# Patient Record
Sex: FEMALE | Race: WHITE | NOT HISPANIC OR LATINO | Employment: UNEMPLOYED | URBAN - METROPOLITAN AREA
[De-identification: names, ages, dates, MRNs, and addresses within clinical notes are randomized per-mention and may not be internally consistent; named-entity substitution may affect disease eponyms.]

---

## 2019-11-06 ENCOUNTER — TELEPHONE (OUTPATIENT)
Dept: GASTROENTEROLOGY | Facility: CLINIC | Age: 54
End: 2019-11-06
Payer: MEDICARE

## 2019-11-07 ENCOUNTER — CARE COORDINATION (OUTPATIENT)
Dept: GASTROENTEROLOGY | Facility: CLINIC | Age: 54
End: 2019-11-07

## 2019-11-07 NOTE — PROGRESS NOTES
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 She may need surgical resection and I believe would benefit from multidisciplinary approach thus my recommendation is urgent referral to Halley Plata/Razia at [facility] GI/surgery dept for consideration of repeat EUS and/or surgery (whipple vs TPIAT). Further management of likely main duct IPMN per [facility].     Referring contact information see initial referral note   Referral for EUS    Diagnosis: IPMN    Imaging:   CT   Location: [facility]  Date:  11/19/18  CONCLUSION:  1.  Pancreatic ductal dilatation is again seen.  2.  No bowel obstruction or abscess.  3.  Diverticulosis.  4.  Normal appendix.    MRI    Location:  [facility]  Date:  09/21/19  IMPRESSION:  1.  Diffusely dilated pancreatic duct measuring up to 9 mm. No intraductal abnormality. The findings may be related to an ampullary stricture. Main duct intraductal papillary mucinous neoplasm could also be a consideration. No solid lesion evident.  2.  No choledocholithiasis.  3.  Layering gallbladder sludge.    10/31/18  CONCLUSION:  1.  Pancreatic duct is dilated and is particularly dilated in the head of the pancreas where it measures up to 7 mm. Common bile duct measures at the upper limits of normal at 6 mm. Etiology for the pancreatic ductal dilatation is not visualized. Recommend ERCP for further evaluation for etiology of the ductal dilatation. A mass is not seen in the head of the pancreas, however, an ampullary lesion is certainly not excluded, given the extent of the pancreatic ductal dilatation.    Procedures:  EUS 11/13/18; 10/29/19 - see care everywhere; pathology in note from Dr. Escobar 10/29/19    Referral will be reviewed by Dr. Plata  - images arrived at 3 pm 11/7/19    Referral sent to [facility] Schaefer  for surgery who will review this consult.  See Dr. Plata note.     Carla Flannery  BSN, HNBC  RN Care Coordinator  [facility]  Ph: [phone]  Email: [email or web address]

## 2019-11-08 NOTE — PROGRESS NOTES
Discussed with Kenzie Ace.    I would favor evaluation by Dr. Mallory, who performs the vast majority of our pancreatic neoplasm surgery. Referral is for concern re possible main duct variant IPMN with pancreatic duct dilation to 1 cm.     Pt has had two EUS exams with needle aspiration of pancreatic duct by Dr. Escobar (10/29/19 and 11/19/18). CEA levels in fluid low, however most recent cytology showed mucinous epithelium without high-grade dysplasia. Neither EUS or MRIs suggested parenchymal changes of chronic pancreatitis and EUS did not show ampullary lesion.   Has not had ERCP. Last MRI 9/21/19.    Referral requested due to concern for main duct IPMN which may warrant surgical intervention. I do not see that pt has previously had outpt clinic consultations for this.  Review of chart otherwise significant for chronic pain issues with chronic narcotic use and controlled substance agreement (appears related to musculoskeletal issues) and outpt GI clinic evaluation for chronic nausea and vomiting.    This chart will be forwarded to Kenzie Ace to review with Dr. Mallory re possible consultation. I will be available to see pt in clinic or perform additional endoscopic evaluation if it is agreed that pt warrants surgical consultation here.    PATRICK Plata MD  Associate Professor of Medicine  Division of Gastroenterology, Hepatology and Nutrition  [facility]

## 2019-11-11 ENCOUNTER — DOCUMENTATION ONLY (OUTPATIENT)
Dept: SURGERY | Facility: CLINIC | Age: 54
End: 2019-11-11

## 2019-11-11 DIAGNOSIS — D49.0 IPMN (INTRADUCTAL PAPILLARY MUCINOUS NEOPLASM): Primary | ICD-10-CM

## 2019-11-11 NOTE — PROGRESS NOTES
Surgical Oncology/Hepatobiliary Surgery Referral      Referring provider: Dr. Ronquillo      Referred to: Surgical oncology - Hepatobiliary Surgeon      Referral Received:  11/8/2019      Evaluation for: IPMN      Oncology provider (if applicable): KEV      Clinical History (per RN review of records provided):      - Patient was sent for consult for Multidisciplinary review for IPMN with Dr. Plata and Dr. Randle. Dr. Randle doesn't see pre malignant and referral forwarded on to the Surg Onc group for appointment.     - Referral referred by Dr. Plata and felt patient needs surgical consult only at this time as she has had ongoing GI surveillance for pancreas cyst with GI provider at [facility]. Patient also noted to have ongoing nausea and vomiting along with chronic narcotic use. He does not feel pancreas cyst is related to ongoing GI symptom and patient can continue follow up with current GI provider as he would not change surveillance plans should surgery not be offered.     - Patient followed closely by GI at [facility]. Previous imaging and EUS procedure done including pancreatic duct biopsies all available in Care everywhere.       Imaging: Imaging pushed, will obtain new CT A/P with pancreas protocol prior to appointment with surgery per protocol      Staging complete (if applicable): KEV       11/11/2019 8:44 AM - Message sent to scheduling to set patient up for first available consult with provider and CT scan.

## 2019-11-14 NOTE — TELEPHONE ENCOUNTER
ONCOLOGY INTAKE: Records Information      APPT INFORMATION: 12/20/19 - [facility] - [facility] [place]  Referring provider:  Yg  Referring provider s clinic:  [facility]  Reason for visit/diagnosis:  IPMN  Has patient been notified of appointment date and time?: Yes    RECORDS INFORMATION:  Were the records received with the referral (via [employer])? Internal referral    Has patient been seen for any external appt for this diagnosis? No    If yes, where? NA    Has patient had any imaging or procedures outside of [place] for this condition? No      If Yes, where? NA    ADDITIONAL INFORMATION:  Scheduled via in[employer] from Kenzie   I called & confirmed appt with pt    Pt requesting to have CT CAP done at [facility] in [place] instead of [facility] - inbasket sent to Kenzie to advise

## 2019-11-15 NOTE — TELEPHONE ENCOUNTER
RECORDS STATUS - ALL OTHER DIAGNOSIS      RECORDS RECEIVED FROM: [facility] - Internal, All HP Images resolved, viewable to PACS. Reports in CE.    DATE RECEIVED: 11/15/19   NOTES STATUS DETAILS   OFFICE NOTE from referring provider     OFFICE NOTE from medical oncologist     DISCHARGE SUMMARY from hospital     DISCHARGE REPORT from the ER     OPERATIVE REPORT     MEDICATION LIST     CLINICAL TRIAL TREATMENTS TO DATE     LABS     PATHOLOGY REPORTS     ANYTHING RELATED TO DIAGNOSIS     GENONOMIC TESTING     TYPE:     IMAGING (NEED IMAGES & REPORT)     CT SCANS     MRI     MAMMO     ULTRASOUND     PET

## 2019-11-20 ENCOUNTER — TELEPHONE (OUTPATIENT)
Dept: SURGERY | Facility: CLINIC | Age: 54
End: 2019-11-20

## 2019-12-18 ENCOUNTER — ANCILLARY PROCEDURE (OUTPATIENT)
Dept: CT IMAGING | Facility: CLINIC | Age: 54
End: 2019-12-18
Attending: SURGERY
Payer: MEDICARE

## 2019-12-18 DIAGNOSIS — D49.0 IPMN (INTRADUCTAL PAPILLARY MUCINOUS NEOPLASM): ICD-10-CM

## 2019-12-18 LAB
CREAT BLD-MCNC: 1 MG/DL (ref 0.52–1.04)
GFR SERPL CREATININE-BSD FRML MDRD: 58 ML/MIN/{1.73_M2}

## 2019-12-18 RX ORDER — IOPAMIDOL 755 MG/ML
122 INJECTION, SOLUTION INTRAVASCULAR ONCE
Status: COMPLETED | OUTPATIENT
Start: 2019-12-18 | End: 2019-12-18

## 2019-12-18 RX ADMIN — IOPAMIDOL 122 ML: 755 INJECTION, SOLUTION INTRAVASCULAR at 13:11

## 2019-12-19 ASSESSMENT — ENCOUNTER SYMPTOMS
DECREASED CONCENTRATION: 0
NAUSEA: 1
BLOOD IN STOOL: 0
NERVOUS/ANXIOUS: 1
MUSCLE WEAKNESS: 0
CONSTIPATION: 1
MYALGIAS: 1
RECTAL PAIN: 0
BACK PAIN: 1
INSOMNIA: 1
PANIC: 0
ARTHRALGIAS: 1
JOINT SWELLING: 1
BLOATING: 1
JAUNDICE: 0
MUSCLE CRAMPS: 0
VOMITING: 1
BOWEL INCONTINENCE: 0
NECK PAIN: 1
DEPRESSION: 1
ABDOMINAL PAIN: 1
STIFFNESS: 1
HEARTBURN: 1

## 2019-12-20 ENCOUNTER — OFFICE VISIT (OUTPATIENT)
Dept: SURGERY | Facility: CLINIC | Age: 54
End: 2019-12-20
Attending: SURGERY
Payer: MEDICARE

## 2019-12-20 ENCOUNTER — PRE VISIT (OUTPATIENT)
Dept: SURGERY | Facility: CLINIC | Age: 54
End: 2019-12-20

## 2019-12-20 VITALS
HEIGHT: 69 IN | HEART RATE: 101 BPM | OXYGEN SATURATION: 98 % | RESPIRATION RATE: 16 BRPM | TEMPERATURE: 98.9 F | BODY MASS INDEX: 29.28 KG/M2 | SYSTOLIC BLOOD PRESSURE: 114 MMHG | WEIGHT: 197.7 LBS | DIASTOLIC BLOOD PRESSURE: 74 MMHG

## 2019-12-20 DIAGNOSIS — D49.0 IPMN (INTRADUCTAL PAPILLARY MUCINOUS NEOPLASM): Primary | ICD-10-CM

## 2019-12-20 PROCEDURE — G0463 HOSPITAL OUTPT CLINIC VISIT: HCPCS | Mod: ZF

## 2019-12-20 PROCEDURE — 40000114 ZZH STATISTIC NO CHARGE CLINIC VISIT

## 2019-12-20 RX ORDER — OXYCODONE HYDROCHLORIDE 10 MG/1
10 TABLET ORAL 4 TIMES DAILY
COMMUNITY
Start: 2019-12-03 | End: 2020-03-19

## 2019-12-20 RX ORDER — CITALOPRAM HYDROBROMIDE 20 MG/1
20 TABLET ORAL EVERY MORNING
COMMUNITY
Start: 2019-02-11 | End: 2021-08-10 | Stop reason: ALTCHOICE

## 2019-12-20 RX ORDER — SUMATRIPTAN 100 MG/1
100 TABLET, FILM COATED ORAL
COMMUNITY
Start: 2016-05-23 | End: 2020-07-20

## 2019-12-20 ASSESSMENT — PAIN SCALES - GENERAL: PAINLEVEL: MILD PAIN (3)

## 2019-12-20 ASSESSMENT — MIFFLIN-ST. JEOR: SCORE: 1562.17

## 2019-12-20 NOTE — LETTER
"12/20/2019       RE: Nathalie Bashir  [address] [address]     Dear Colleague,    Thank you for referring your patient, Nathalie Bashir, to the [facility]. Please see a copy of my visit note below.    [facility] - Surgical Oncology    NEW CONSULTATION  Dec 20, 2019    Reason for Visit:    Nathalie Bashir is a 54 year old female who presents with suspected main duct IPMN in the pancreatic head.  She was referred by Dr Moulton.    Pertinent Oncologic History:  54 F w/ history of atypical abdominal symptoms and GI complaints including nausea and intermittent diarrhea.  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  While on surveillance, her most recent EUS showed increase in the size of the cyst described as dilation of the main pancreatic duct in the head of the pancreas to 10.4 mm.  MRI 9 mm, and CT 8 mm.  No other suspicious features.  FNA and fluid analysis showed mucinous epithelium, a string sign, high fluid amylase, and normal fluid CEA.  She now presents for recommendations on management.    Pertinent Exam: Abdomen soft, non-tender, and non-distended.  No jaundice or scleral icterus.    HISTORY OF PRESENT ILLNESS:  The patient is feeling relatively well and is having her typical abdominal symptoms.  She has no history of prior pancreatitis, and no imaging or EUS evidence of pancreatitis or pancreatic duct stricture.  No anorexia or weight loss.  No jaundice or pruritis.  She does not routinely use alcohol, but she is an intermittent smoker.  She has decent exercise tolerance without exertional dyspnea or chest pain, and is independent with ADLs.  No cardiac history.  No blood thinners.  No prior abdominal surgery.    /74   Pulse 101   Temp 98.9  F (37.2  C) (Oral)   Resp 16   Ht 1.746 m (5' 8.75\")   Wt 89.7 kg (197 lb 11.2 oz)   SpO2 98%   BMI 29.41 kg/m        Pertinent Work-Up/Findings:    Labs: " Cyst fluid analysis from 10/29/2019 --> CEA 4.3, Amylase 207,252.  Cyst fluid cytology shows mucinous epithelium with degenerative changes, but no evidence of dysplasia.    MRI (9/21/2019): Diffusely dilated main pancreatic duct with maximal diameter of 9 mm in the head of the pancreas.  No masses.  No choledocholithiasis, but presence of gallbladder sludge.    CT (12/18/2019): Stable prominence of the main pancreatic duct with maximal diameter of 8 mm in the head of the pancreas.  No other suspicious features.    EUS (10/29/2019): 10.4 mm dilation of the main pancreatic duct in the head without duct stricture or sequelae of chronic pancreatitis.  No masses.  Normal appearing ampulla.  Cyst fluid with positive string sign.    Assessment/Counseling/Plan:    Nathalie Bashir is a 54 year old female with likely main duct IPMN with main duct size of 8-10.4 mm depending on imaging modality.  This is based off the presence of a dilated main duct and also the presence of mucinous epithelium on the cyst fluid analysis.  There are no other suspicious features.  I had an extensive discussion with the patient regarding pancreatic cysts, in particular, IPMN.  I discussed that I believe she has a main duct IPMN with a duct size that is consistent with at least a worrisome feature, and borderline for high risk based on her multiple imaging modalities and the most recent [place] guidelines for management of pancreatic cysts.  In this case, I discussed the increased risk of occult malignancy or malignant degeneration moving forward.  She is relatively healthy and only 54 years of age, thus this IPMN is likely to be a significant factor moving forward.  As such, I discussed that the standard of care is to offer surgical resection.  This would require a Whipple operation.  I believe she will tolerate the operation, but I mentioned that the surgery is A) unlikely to correct her current GI symptoms, B) may reveal completely benign duct  changes, or C) may reveal the presence of an occult invasive focus.  Regardless, the benefit of the operation is to either mitigate the risk of malignant degeneration in the future or to treat a small focus of invasive disease if present.  With that being said, I discussed the risks of the operation including but not limited to death, bleeding, infection, UTI, pneumonia, DVT/PE, cardiac events, stroke, renal failure, delayed gastric emptying, anastomotic leaks (GJ, biliary, and pancreatic), need for further intervention or prolonged drainage, pseudoaneurysm formation and rupture, chyle leak, damage to surrounding structures, failure to thrive, and potential need for enteral or parenteral nutritional support.  I discussed that smoking increases most, if not all of these risks, particularly issues related to healing, pneumonia, and blood clots.  I recommended cessation at least 30 days prior to any surgical intervention.  Prior to scheduling the patient with the surgery, I plan to further discuss the patient with my colleagues to obtain a general consensus on proceeding with a high risk surgery.  I will call the patient with the final plan, but I informed her the high likelihood that the recommendation for surgery will change.  All questions were answered and the patient was in agreement with and understanding of the plan.    Total time spent with the patient was 45 minutes, of which more than half was counseling and coordination of care.    Again, thank you for allowing me to participate in the care of your patient.      Sincerely,    Yandel Mallory MD

## 2019-12-20 NOTE — PROGRESS NOTES
"[facility] - Surgical Oncology    NEW CONSULTATION  Dec 20, 2019    Reason for Visit:    Nathalie Bashir is a 54 year old female who presents with suspected main duct IPMN in the pancreatic head.  She was referred by Dr Moulton.    Pertinent Oncologic History: 54 F w/ history of atypical abdominal symptoms and GI complaints including nausea and intermittent diarrhea.  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  While on surveillance, her most recent EUS showed increase in the size of the cyst described as dilation of the main pancreatic duct in the head of the pancreas to 10.4 mm.  MRI 9 mm, and CT 8 mm.  No other suspicious features.  FNA and fluid analysis showed mucinous epithelium, a string sign, high fluid amylase, and normal fluid CEA.  She now presents for recommendations on management.    Pertinent Exam: Abdomen soft, non-tender, and non-distended.  No jaundice or scleral icterus.    HISTORY OF PRESENT ILLNESS:  The patient is feeling relatively well and is having her typical abdominal symptoms.  She has no history of prior pancreatitis, and no imaging or EUS evidence of pancreatitis or pancreatic duct stricture.  No anorexia or weight loss.  No jaundice or pruritis.  She does not routinely use alcohol, but she is an intermittent smoker.  She has decent exercise tolerance without exertional dyspnea or chest pain, and is independent with ADLs.  No cardiac history.  No blood thinners.  No prior abdominal surgery.    /74   Pulse 101   Temp 98.9  F (37.2  C) (Oral)   Resp 16   Ht 1.746 m (5' 8.75\")   Wt 89.7 kg (197 lb 11.2 oz)   SpO2 98%   BMI 29.41 kg/m       Pertinent Work-Up/Findings:    Labs: Cyst fluid analysis from 10/29/2019 --> CEA 4.3, Amylase 207,252.  Cyst fluid cytology shows mucinous epithelium with degenerative changes, but no evidence of dysplasia.    MRI (9/21/2019): Diffusely dilated main pancreatic duct with " maximal diameter of 9 mm in the head of the pancreas.  No masses.  No choledocholithiasis, but presence of gallbladder sludge.    CT (12/18/2019): Stable prominence of the main pancreatic duct with maximal diameter of 8 mm in the head of the pancreas.  No other suspicious features.    EUS (10/29/2019): 10.4 mm dilation of the main pancreatic duct in the head without duct stricture or sequelae of chronic pancreatitis.  No masses.  Normal appearing ampulla.  Cyst fluid with positive string sign.    Assessment/Counseling/Plan:    Nathalie Bashir is a 54 year old female with likely main duct IPMN with main duct size of 8-10.4 mm depending on imaging modality.  This is based off the presence of a dilated main duct and also the presence of mucinous epithelium on the cyst fluid analysis.  There are no other suspicious features.  I had an extensive discussion with the patient regarding pancreatic cysts, in particular, IPMN.  I discussed that I believe she has a main duct IPMN with a duct size that is consistent with at least a worrisome feature, and borderline for high risk based on her multiple imaging modalities and the most recent [place] guidelines for management of pancreatic cysts.  In this case, I discussed the increased risk of occult malignancy or malignant degeneration moving forward.  She is relatively healthy and only 54 years of age, thus this IPMN is likely to be a significant factor moving forward.  As such, I discussed that the standard of care is to offer surgical resection.  This would require a Whipple operation.  I believe she will tolerate the operation, but I mentioned that the surgery is A) unlikely to correct her current GI symptoms, B) may reveal completely benign duct changes, or C) may reveal the presence of an occult invasive focus.  Regardless, the benefit of the operation is to either mitigate the risk of malignant degeneration in the future or to treat a small focus of invasive disease if  present.  With that being said, I discussed the risks of the operation including but not limited to death, bleeding, infection, UTI, pneumonia, DVT/PE, cardiac events, stroke, renal failure, delayed gastric emptying, anastomotic leaks (GJ, biliary, and pancreatic), need for further intervention or prolonged drainage, pseudoaneurysm formation and rupture, chyle leak, damage to surrounding structures, failure to thrive, and potential need for enteral or parenteral nutritional support.  I discussed that smoking increases most, if not all of these risks, particularly issues related to healing, pneumonia, and blood clots.  I recommended cessation at least 30 days prior to any surgical intervention.  Prior to scheduling the patient with the surgery, I plan to further discuss the patient with my colleagues to obtain a general consensus on proceeding with a high risk surgery.  I will call the patient with the final plan, but I informed her the high likelihood that the recommendation for surgery will change.  All questions were answered and the patient was in agreement with and understanding of the plan.    Total time spent with the patient was 45 minutes, of which more than half was counseling and coordination of care.

## 2019-12-26 ENCOUNTER — TELEPHONE (OUTPATIENT)
Dept: SURGERY | Facility: CLINIC | Age: 54
End: 2019-12-26

## 2019-12-26 NOTE — TELEPHONE ENCOUNTER
I called to inform the patient that I had discussed her case with a partner and we are in agreement that no further work-up needs to be completed.  We believe she has main duct IPMN with borderline high risk features given dilation of 10.4 mm on EUS.  As such, I am recommending we proceed with surgery as discussed in our previous clinic appointment.  All questions answered and the patient was in agreement with and understanding of the plan.

## 2019-12-27 DIAGNOSIS — D49.0 IPMN (INTRADUCTAL PAPILLARY MUCINOUS NEOPLASM): Primary | ICD-10-CM

## 2020-01-03 ENCOUNTER — TELEPHONE (OUTPATIENT)
Dept: SURGERY | Facility: CLINIC | Age: 55
End: 2020-01-03

## 2020-01-03 DIAGNOSIS — D49.0 IPMN (INTRADUCTAL PAPILLARY MUCINOUS NEOPLASM): Primary | ICD-10-CM

## 2020-01-03 NOTE — TELEPHONE ENCOUNTER
I scheduled surgery for this patient with Dr. Mallory on 1/28 at [place].   Scheduled per patient/orders.    Additional appointments:  1/27:   PAC at 10:30 AM    Post-op: 2/21 at 10 AM    I called the patient and was able to confirm the scheduled dates.     I sent a surgery packet to them via US mail and [employer], per their preference. This contains education and directions for the surgery.     They are aware that they will EITHER receive a call  ~2 days prior to the scheduled procedure and will be given an exact arrival/start time OR get their exact times at their appointment with PAC. They are also aware that more education regarding the surgery will be provided at their consult.

## 2020-01-06 NOTE — TELEPHONE ENCOUNTER
FUTURE VISIT INFORMATION      SURGERY INFORMATION:    Date: 20    Location: [facility] OR    Surgeon:  Yandel Mallory    Anesthesia Type:  General    RECORDS REQUESTED FROM:       [facility]    [id number]

## 2020-01-15 PROBLEM — M54.50 CHRONIC BILATERAL LOW BACK PAIN WITHOUT SCIATICA: Status: ACTIVE | Noted: 2017-08-18

## 2020-01-15 PROBLEM — G89.29 CHRONIC BILATERAL LOW BACK PAIN WITHOUT SCIATICA: Status: ACTIVE | Noted: 2017-08-18

## 2020-01-15 PROBLEM — G43.109 MIGRAINE WITH AURA: Status: ACTIVE | Noted: 2018-06-21

## 2020-01-15 PROBLEM — Z79.899 CONTROLLED SUBSTANCE AGREEMENT SIGNED: Status: ACTIVE | Noted: 2017-06-29

## 2020-01-15 PROBLEM — M54.12 CERVICAL RADICULOPATHY, CHRONIC: Status: ACTIVE | Noted: 2017-08-18

## 2020-01-15 PROBLEM — K40.90 UNILATERAL INGUINAL HERNIA WITHOUT OBSTRUCTION OR GANGRENE: Status: ACTIVE | Noted: 2018-04-05

## 2020-01-15 PROBLEM — M54.2 CHRONIC NECK PAIN: Status: ACTIVE | Noted: 2018-08-30

## 2020-01-15 PROBLEM — F32.A DEPRESSION: Status: ACTIVE | Noted: 2020-01-15

## 2020-01-15 PROBLEM — M46.1 SACROILIITIS (H): Status: ACTIVE | Noted: 2020-01-15

## 2020-01-15 PROBLEM — M71.9 BURSITIS: Status: ACTIVE | Noted: 2020-01-15

## 2020-01-15 PROBLEM — G89.29 CHRONIC NECK PAIN: Status: ACTIVE | Noted: 2018-08-30

## 2020-01-17 ENCOUNTER — PRE VISIT (OUTPATIENT)
Dept: SURGERY | Facility: CLINIC | Age: 55
End: 2020-01-17

## 2020-01-17 ENCOUNTER — OFFICE VISIT (OUTPATIENT)
Dept: SURGERY | Facility: CLINIC | Age: 55
End: 2020-01-17
Payer: MEDICARE

## 2020-01-17 ENCOUNTER — ANESTHESIA EVENT (OUTPATIENT)
Dept: SURGERY | Facility: CLINIC | Age: 55
DRG: 327 | End: 2020-01-17
Payer: MEDICARE

## 2020-01-17 VITALS — BODY MASS INDEX: 29.7 KG/M2 | HEIGHT: 68 IN | WEIGHT: 196 LBS

## 2020-01-17 VITALS
HEIGHT: 68 IN | WEIGHT: 196 LBS | TEMPERATURE: 98.1 F | HEART RATE: 93 BPM | BODY MASS INDEX: 29.7 KG/M2 | RESPIRATION RATE: 20 BRPM | DIASTOLIC BLOOD PRESSURE: 74 MMHG | OXYGEN SATURATION: 98 % | SYSTOLIC BLOOD PRESSURE: 108 MMHG

## 2020-01-17 DIAGNOSIS — Z01.818 PREOP EXAMINATION: ICD-10-CM

## 2020-01-17 DIAGNOSIS — D49.0 IPMN (INTRADUCTAL PAPILLARY MUCINOUS NEOPLASM): ICD-10-CM

## 2020-01-17 DIAGNOSIS — Z01.818 PREOP EXAMINATION: Primary | ICD-10-CM

## 2020-01-17 PROBLEM — Z79.891 LONG TERM (CURRENT) USE OF OPIATE ANALGESIC: Status: ACTIVE | Noted: 2020-01-17

## 2020-01-17 LAB
ALBUMIN SERPL-MCNC: 3.7 G/DL (ref 3.4–5)
ALP SERPL-CCNC: 106 U/L (ref 40–150)
ALT SERPL W P-5'-P-CCNC: 17 U/L (ref 0–50)
ANION GAP SERPL CALCULATED.3IONS-SCNC: 4 MMOL/L (ref 3–14)
AST SERPL W P-5'-P-CCNC: 12 U/L (ref 0–45)
BILIRUB SERPL-MCNC: 0.4 MG/DL (ref 0.2–1.3)
BUN SERPL-MCNC: 10 MG/DL (ref 7–30)
CALCIUM SERPL-MCNC: 9.1 MG/DL (ref 8.5–10.1)
CHLORIDE SERPL-SCNC: 102 MMOL/L (ref 94–109)
CO2 SERPL-SCNC: 29 MMOL/L (ref 20–32)
CREAT SERPL-MCNC: 0.93 MG/DL (ref 0.52–1.04)
ERYTHROCYTE [DISTWIDTH] IN BLOOD BY AUTOMATED COUNT: 13.7 % (ref 10–15)
GFR SERPL CREATININE-BSD FRML MDRD: 70 ML/MIN/{1.73_M2}
GLUCOSE SERPL-MCNC: 90 MG/DL (ref 70–99)
HCT VFR BLD AUTO: 40.9 % (ref 35–47)
HGB BLD-MCNC: 13.7 G/DL (ref 11.7–15.7)
MCH RBC QN AUTO: 29.7 PG (ref 26.5–33)
MCHC RBC AUTO-ENTMCNC: 33.5 G/DL (ref 31.5–36.5)
MCV RBC AUTO: 89 FL (ref 78–100)
NT-PROBNP SERPL-MCNC: 184 PG/ML (ref 0–125)
PLATELET # BLD AUTO: 346 10E9/L (ref 150–450)
POTASSIUM SERPL-SCNC: 4.1 MMOL/L (ref 3.4–5.3)
PROT SERPL-MCNC: 7.1 G/DL (ref 6.8–8.8)
RBC # BLD AUTO: 4.61 10E12/L (ref 3.8–5.2)
SODIUM SERPL-SCNC: 135 MMOL/L (ref 133–144)
WBC # BLD AUTO: 8.2 10E9/L (ref 4–11)

## 2020-01-17 RX ORDER — HYDROXYZINE HYDROCHLORIDE 10 MG/1
10 TABLET, FILM COATED ORAL 3 TIMES DAILY PRN
COMMUNITY
End: 2020-07-20

## 2020-01-17 ASSESSMENT — ENCOUNTER SYMPTOMS: SEIZURES: 0

## 2020-01-17 ASSESSMENT — MIFFLIN-ST. JEOR
SCORE: 1542.55
SCORE: 1542.55

## 2020-01-17 ASSESSMENT — PAIN SCALES - GENERAL
PAINLEVEL: SEVERE PAIN (6)
PAINLEVEL: SEVERE PAIN (6)

## 2020-01-17 ASSESSMENT — LIFESTYLE VARIABLES: TOBACCO_USE: 1

## 2020-01-17 NOTE — PHARMACY - PREOPERATIVE ASSESSMENT CENTER
PREOPERATIVE PAIN CONSULT FOR POSTOPERATIVE PAIN MANAGEMENT  Nathalie Bashir was seen and interviewed during PAC Clinic appointment on January 17, 2020 in preparation for the planned procedure with Dr. Lynch on 1/28/2020 at the [facility] for Open Whipple.  These recommendations are intended for patients admitted to the hospital after a procedure and are only valid for 30 days from the date of service. If there are significant changes in opioid dosing between today and day of procedure the below recommendations may have to be adjusted.      RECOMMENDATIONS:   The following pain management recommendations are made based on information from today's visit and should not replace medical decision-making based on patient condition at the time of procedure or postoperatively.      - PREOPERATIVE:  + Take morning dose of oxycodone.  NOTE:  PATIENT DOES NOT TOLERATE ACETAMINOPHEN OR GABAPENTIN / PREGABALIN    - INTRAOPERATIVE (Anesthesiologist/CRNA to consider):   + Regional anesthesia - Defer to RAPS team  + Ketamine IV intraoperatively  + Avoid remifentanil - to reduce risk of developing hyperalgesia    - POSTOPERATIVE MANAGEMENT:  Place inpatient pain management consult to assist with acute postoperative pain management.    Opioid analgesic:     + If unable to take oral medications:          -- HYDROmorphone (Dilaudid) PCA dose range 0.2-0.4 mg Q 10 min lockout interval with NO continuous rate.  Start with 0.3mg PCA dose and increase dose by 0.1 mg every 1 hour as necessary for pain control or improvement in physical function, hold or reduce dose for sedation. Notify provider to assess for uncontrolled pain or sedation and adjust dose as necessary.          -- Hold other PO and IV opioids while on PCA    Nonopioid analgesics:   + Defer use of NSAID to surgeon  NOTE:  PATIENT DOES NOT TOLERATE ACETAMINOPHEN, GABAPENTIN OR PREGABALIN    Muscle Relaxant:   + izanidine 4 mg PO Q8 hr PRN muscle spasm  OR  + If unable to tolerate PO meds, use Diazepam (Valium) 2.5 mg IV Q 6 hr PRN muscle spasm    Stool softeners/Laxatives:   + When appropriate start senna-docusate 1-2 tabs PO BID and Miralax 17 g daily to prevent opioid induced constipation.     Other:  + Recommend close monitoring of respiratory status postoperatively with capnography and continuous SpO2 monitoring. Would recommend continuing capnography beyond the usual 24 hr due to patient's opioid tolerance.      + Ketamine IV infusion.  If needed for acute postop uncontrolled pain, the primary service may decide to start ketamine infusion at 5 mg/hr.  Ketamine for acute pain management will be used as an analgesic medication in addition to opioids when usual analgesics are suboptimal.Only start ketamine IV if patient is stable from a cardiovascular standpoint.  Low dose ketamine may be administered on a regular nursing unit according to [facility] ketamine-low dose policy.  Please see policy for details on contraindications, adverse effects and monitoring: [email or web address]     -------------------------------------------------------------------------------------------------------------------  - OUTPATIENT MEDICATIONS (related to pain management):  -- Long-acting opioid:  None  -- Short-acting opioid: Oxycodone 10mg po   -- Intrathecal pump: none  -- Oral adjuvant(s): Tizanidine 4mg po bid prn muscle spasm  -- Topicals:  Occasional diclofenac patch to hips for bursitis  -- Bowel regimen: none   -- Other relevant medications: none    Verbal consent was given by patient to access pharmacy records and [place] Prescription Monitoring Profile: Yes  Outpatient opioids prescribed by [facility], [place], [place]  Outpatient opioid oral morphine equivalent (OME):  60 mg/day    ASSESSMENT:    Nathalie Bashir has a history of pancreatic cancer, bursitis, arthritis and  "is preparing for above mentioned surgery.    Patient reports her pain is located in the bilateral hips, neck and shoulders, has been taking oxycodone for about 1 year,  Can perform ALDs but \"takes it easy\" and works slowly on tasks all day. Waling tolerance is good, denies side effects from opioids, denies Etoh, illicits, running out of opioids early.  Discussed multimodal approach to post op pain management, patient is in agreement to a rotation of oral hydromorphone when able to take orals.  Patient does not tolerate acetaminophen. Gabapentin / pregabalin.    Ketamine -denies uncontrolled hypertension, cardiac arrhythmias, PTSD, BPAD, schizophrenia, psychiatric disorders (eg. hallucinations/delirium), history of brain cancer, previous CVA and increased IOP/glaucoma and is open to using ketamine for pain control.     The inpatient pain service will follow up on patient after consult is placed and offer further recommendations for pain management.  If immediate assistance is needed please contact the pain service at the number below.     Princess JovelD, MS  January 17, 2020  10:17 AM    If questions or concerns, please contact the Inpatient Pain Management Service:  Call [phone] after hours, weekends and holidays.   Page [phone] from 8 AM - 3 PM Mon - Fri.    "

## 2020-01-17 NOTE — PROGRESS NOTES
Preoperative Assessment Center medication history for January 17, 2020 is complete.    See Epic admission navigator for prior to admission medications.   Operating room staff will still need to confirm medications and last dose information on day of surgery.     Medication history interview sources:   Patient    Changes made to PTA medication list (reason)  Added: None  Deleted: None  Changed: None    Additional medication history information (including reliability of information, actions taken by pharmacist):None    -- No recent (within 30 days) course of antibiotics  -- No recent (within 30 days) course of steroids  -- No recent (within 30 days) chronic daily medications stopped   -- Patient declines being on any other prescription or over-the-counter medications    Prior to Admission medications    Medication Sig Last Dose Taking? Auth Provider   amitriptyline (ELAVIL) 25 MG tablet Take 3 tablets by mouth At Bedtime Taking Yes Reported, Patient   citalopram (CELEXA) 20 MG tablet Take 20 mg by mouth every morning  Taking Yes Reported, Patient   diclofenac (FLECTOR) 1.3 % patch Place 1 patch onto the skin daily as needed  Taking Yes Reported, Patient   fluticasone (FLONASE) 50 MCG/ACT nasal spray Spray 2 sprays into both nostrils daily as needed  Taking Yes Reported, Patient   oxyCODONE IR (ROXICODONE) 10 MG tablet Take 10 mg by mouth 4 times daily Taking Yes Reported, Patient   SUMAtriptan (IMITREX) 100 MG tablet Take 100 mg by mouth as needed Taking Yes Reported, Patient   tiZANidine (ZANAFLEX) 4 MG tablet Take 1 tablet by mouth 2 times daily as needed Taking Yes Reported, Patient   calcium carb 1250 mg, 500 mg [place],/vitamin D 200 units (OSCAL WITH D) 500-200 MG-UNIT per tablet Take 1 tablet by mouth 3 times daily (with meals) Not Taking  Reported, Patient   multivitamin, therapeutic with minerals (MULTI-VITAMIN) TABS Take 1 tablet by mouth daily Not Taking  Reported, Patient         Medication history completed  by: Hamida Aguero, [facility]

## 2020-01-17 NOTE — ANESTHESIA PREPROCEDURE EVALUATION
Anesthesia Pre-Procedure Evaluation    Patient: Nathalie Bashir   MRN:     [id number] Gender:   female   Age:    54 year old :      1965        Preoperative Diagnosis: IPMN (intraductal papillary mucinous neoplasm) [D49.0]   Procedure(s):  Diagnostic laparoscopy  Open Whipple procedure,posssible total pancreatectomy  possible feeding jejunostomy tube     Past Medical History:   Diagnosis Date     Allergic rhinitis      Depression      Lumbago      Migraine      Other chronic pain     low back     Sacroiliitis (H)     low back pain     Trochanteric bursitis     leg length discrrepancy, hip pain      Past Surgical History:   Procedure Laterality Date     ARTHROPLASTY HIP Left 2016     BACK SURGERY      L4-5 decompression     C REPAIR DETACH RETINA,SCLERAL BUCKLE       CATARACT IOL, RT/LT       EYE SURGERY       OPEN REDUCTION INTERNAL FIXATION RODDING INTRAMEDULLARY FEMUR Left 2014    Procedure: OPEN REDUCTION INTERNAL FIXATION RODDING INTRAMEDULLARY FEMUR;  Surgeon: Arnol Santiago MD;  Location: [facility] OR     ORTHOPEDIC SURGERY       REMOVE HARDWARE LOWER EXTREMITY Left 2015    Procedure: REMOVE HARDWARE LOWER EXTREMITY;  Surgeon: Anrol Santiago MD;  [facility] OR     REMOVE HARDWARE RODDING INTRAMEDULLARY FEMUR Left 2015    Procedure: REMOVE HARDWARE RODDING INTRAMEDULLARY FEMUR;  Surgeon: Arnol Santiago MD;  [facility] OR     RESECT BONE LOWER EXTREMITY Left 2014    Procedure: RESECT BONE LOWER EXTREMITY;  Surgeon: Arnol Santiago MD;  Location: [facility] OR          Anesthesia Evaluation     . Pt has had prior anesthetic. Type: General, MAC and Regional    No history of anesthetic complications          ROS/MED HX    ENT/Pulmonary:     (+)allergic rhinitis, tobacco use, Current use 1/2 packs/day  , . .   (-) sleep apnea   Neurologic: Comment: Last used Imitrex this morning. Averages 1 HA per week.    (+)neuropathy - in feet, occasionally in right hand, migraines,    (-)  seizures and CVA   Cardiovascular:  - neg cardiovascular ROS   (+) ----. : . . . :. . No previous cardiac testing       METS/Exercise Tolerance: Comment: Activity somewhat limited due to foot pain. Able to walk several blocks w/o stopping. Able to carry laundry up/down stairs. 4 - Raking leaves, gardening   Hematologic:  - neg hematologic  ROS      (-) History of Transfusion   Musculoskeletal: Comment: S/P left hip arthroplasty 2016, revised several months later after dislocation w/ fall.    S/p L4-5 decompression    Leg length discrepancy due to injury, s/p multiple surgeries on LLE.    Chronic neck pain, C4-7 stenosis, some RUE radiculopathy.          GI/Hepatic: Comment: S/P right inguinal hernia repair        Renal/Genitourinary:  - ROS Renal section negative       Endo:  - neg endo ROS    (-) Type II DM and thyroid disease   Psychiatric:     (+) psychiatric history depression      Infectious Disease: Comment: Hip arthroplasty complicated by staph infection, antibiotics x3 mos        Malignancy:      - no malignancy   Other: Comment: Takes oxycodone 10 mg 4x/day   (+) H/O Chronic Pain,H/O chronic opiod use ,                        PHYSICAL EXAM:   Mental Status/Neuro: A/A/O; Age Appropriate   Airway: Facies: Feasible  Mallampati: I  Mouth/Opening: Full  TM distance: > 6 cm  Neck ROM: Full   Respiratory: Auscultation: CTAB     Resp. Rate: Normal     Resp. Effort: Normal      CV: Rhythm: Regular  Rate: Age appropriate  Heart: Normal Sounds  Edema: None   Comments:      Dental: Normal Dentition                LABS:  CBC:   Lab Results   Component Value Date    WBC 6.0 12/07/2016    WBC 5.9 11/30/2016    HGB 8.8 (L) 12/07/2016    HGB 8.6 (L) 11/30/2016    HCT 28.0 (L) 12/07/2016    HCT 25.7 (L) 11/30/2016     12/07/2016     (H) 11/30/2016     BMP:   Lab Results   Component Value Date     12/07/2016     11/30/2016    POTASSIUM 4.0 12/07/2016    POTASSIUM 3.4 11/30/2016    CHLORIDE 104  "12/07/2016    CHLORIDE 109 11/30/2016    CO2 26 12/07/2016    CO2 25 11/30/2016    BUN 12 12/07/2016    BUN 7 11/30/2016    CR 1.12 (H) 12/07/2016    CR 1.03 11/30/2016     (H) 12/07/2016     (H) 11/30/2016     COAGS:   Lab Results   Component Value Date    INR 1.4 01/02/2015     POC:   Lab Results   Component Value Date    HCG Negative 12/17/2015     OTHER:   Lab Results   Component Value Date    PETRA 8.5 12/07/2016    ALBUMIN 2.5 (L) 11/23/2016    PROTTOTAL 6.9 11/23/2016    ALT 11 12/07/2016    AST 15 12/07/2016    ALKPHOS 93 11/23/2016    BILITOTAL 0.9 11/23/2016    CRP 14.3 (H) 12/07/2016        Preop Vitals    BP Readings from Last 3 Encounters:   01/17/20 108/74   12/20/19 114/74   12/17/15 106/68    Pulse Readings from Last 3 Encounters:   01/17/20 93   12/20/19 101   04/26/15 64      Resp Readings from Last 3 Encounters:   01/17/20 20   12/20/19 16   12/17/15 14    SpO2 Readings from Last 3 Encounters:   01/17/20 98%   12/20/19 98%   12/17/15 100%      Temp Readings from Last 1 Encounters:   01/17/20 98.1  F (36.7  C) (Oral)    Ht Readings from Last 1 Encounters:   01/17/20 1.727 m (5' 8\")      Wt Readings from Last 1 Encounters:   01/17/20 88.9 kg (196 lb)    Estimated body mass index is 29.8 kg/m  as calculated from the following:    Height as of this encounter: 1.727 m (5' 8\").    Weight as of this encounter: 88.9 kg (196 lb).     LDA:  Peripheral IV 12/24/14 Left Lower forearm (Active)   Number of days: 1850        Assessment:   ASA SCORE: 3    H&P: History and physical reviewed and following examination; no interval change.     Smoking Status:  Active Smoker       - patient did not smoke on day of surgery       - instructed to abstain from smoking on day of procedure   NPO Status: NPO Appropriate     Plan:   Anes. Type:  General   Pre-Medication: None   Induction:  IV (Standard)   Airway: ETT; Oral; CMAC/VL   Access/Monitoring: PIV; 2nd PIV; A-Line; FloTrac   Maintenance: Balanced     " Blood products: PRBC     Drips/Meds: Dexmedetomidine; Ketamine; Phenylephrine     Postop Plan:   Postop Pain: Opioids; Regional  Disposition: Inpatient/Admit     PONV Management:   Adult Risk Factors: Female, Postop Opioids   Prevention: Ondansetron, Dexamethasone     CONSENT: Direct conversation   Plan and risks discussed with: Patient   Blood Products: Consented (ALL Blood Products)                PAC Discussion and Assessment    ASA Classification: 2  Case is suitable for: [place]  Anesthetic techniques and relevant risks discussed: GA  Invasive monitoring and risk discussed: No  Types:   Possibility and Risk of blood transfusion discussed: No  NPO instructions given:   Additional anesthetic preparation and risks discussed:   Needs early admission to pre-op area:   Other:     PAC Resident/NP Anesthesia Assessment:  Nathalie Bashir is a 54 year old female scheduled to undergo Diagnostic laparoscopy; Open Whipple procedure,posssible total pancreatectomy; possible feeding jejunostomy tube with Yandel Mallory MD on 1/28/20 at [facility] for treatment of IPMN (intraductal papillary mucinous neoplasm)    Pt has had prior anesthetic. Type: General, MAC and Regional    No history of anesthetic complications    She has the following specific operative considerations:   # LISHA 1/8 = low risk  # VTE risk: 0.26%  # Risk of PONV score = 2.  If > 2, anti-emetic intervention recommended.  # Anesthesia considerations:  Refer to PAC assessment in anesthesia records    # On chronic opiates, taking oxycodone 10 mg 4x/day. See pharmacy note regarding periop pain management.    CARDIAC: METS 4,  Activity somewhat limited due to foot pain. Able to walk several blocks w/o stopping. Able to carry laundry up/down stairs.     # RCRI : High risk surgery (>5% cardiac complication risk).  0.9% risk of major adverse cardiac event.     #  EKG today: NSR, Normal ECG, Ventricular rate 86 bpm     #  BNP  today: 184    PULMONARY:     # Current smoker, 12.5 pk yr hx, smokes 1/2 pk/day    # No asthma or inhaler use    GI: no GERD      ENDO: BMI 29    # intraductal papillary mucinous neoplasm    # No DM    ORTHO: Full neck ROM, no TMJ    # C4-7 stenosis w/ RUE radiculopathy. Use caution w/ head/neck position.    # Leg length discrepancy due to injury, s/p multiple LLE ortho surgeries    # s/p L4-5 decompression    NEURO/PSYCH:     # Migraines, averages 1x/week, last used Imitrex this morning     Patient was discussed with Dr Beckford. Patient is optimized and is acceptable candidate for the proposed procedure. No further diagnostic evaluation is needed.      Reviewed and Signed by PAC Mid-Level Provider/Resident  Mid-Level Provider/Resident: Jackie Sharma PA-C  Date: 1/17/20  Time: 1305    Attending Anesthesiologist Anesthesia Assessment:        Anesthesiologist:   Date:   Time:   Pass/Fail:   Disposition:     PAC Pharmacist Assessment:  PREOPERATIVE PAIN CONSULT FOR POSTOPERATIVE PAIN MANAGEMENT  Nathalie Bashir was seen and interviewed during PAC Clinic appointment on January 17, 2020 in preparation for the planned procedure with Dr. Lynch on 1/28/2020 at the [facility] for Open [facility].  These recommendations are intended for patients admitted to the hospital after a procedure and are only valid for 30 days from the date of service. If there are significant changes in opioid dosing between today and day of procedure the below recommendations may have to be adjusted.      RECOMMENDATIONS:   The following pain management recommendations are made based on information from today's visit and should not replace medical decision-making based on patient condition at the time of procedure or postoperatively.      - PREOPERATIVE:  + Take morning dose of oxycodone.  NOTE:  PATIENT DOES NOT TOLERATE ACETAMINOPHEN OR GABAPENTIN / PREGABALIN    - INTRAOPERATIVE (Anesthesiologist/CRNA to consider):   +  Regional anesthesia - Defer to RAPS team  + Ketamine IV intraoperatively  + Avoid remifentanil - to reduce risk of developing hyperalgesia    - POSTOPERATIVE MANAGEMENT:  Place inpatient pain management consult to assist with acute postoperative pain management.    Opioid analgesic:     + If unable to take oral medications:          -- HYDROmorphone (Dilaudid) PCA dose range 0.2-0.4 mg Q 10 min lockout interval with NO continuous rate.  Start with 0.3mg PCA dose and increase dose by 0.1 mg every 1 hour as necessary for pain control or improvement in physical function, hold or reduce dose for sedation. Notify provider to assess for uncontrolled pain or sedation and adjust dose as necessary.          -- Hold other PO and IV opioids while on PCA    Nonopioid analgesics:   + Defer use of NSAID to surgeon  NOTE:  PATIENT DOES NOT TOLERATE ACETAMINOPHEN, GABAPENTIN OR PREGABALIN    Muscle Relaxant:   + izanidine 4 mg PO Q8 hr PRN muscle spasm OR  + If unable to tolerate PO meds, use Diazepam (Valium) 2.5 mg IV Q 6 hr PRN muscle spasm    Stool softeners/Laxatives:   + When appropriate start senna-docusate 1-2 tabs PO BID and Miralax 17 g daily to prevent opioid induced constipation.     Other:  + Recommend close monitoring of respiratory status postoperatively with capnography and continuous SpO2 monitoring. Would recommend continuing capnography beyond the usual 24 hr due to patient's opioid tolerance.      + Ketamine IV infusion.  If needed for acute postop uncontrolled pain, the primary service may decide to start ketamine infusion at 5 mg/hr.  Ketamine for acute pain management will be used as an analgesic medication in addition to opioids when usual analgesics are suboptimal.Only start ketamine IV if patient is stable from a cardiovascular standpoint.  Low dose ketamine may be administered on a regular nursing unit according to [facility] ketamine-low dose policy.  Please see policy for details on contraindications, adverse  "effects and monitoring: [email or web address]     -------------------------------------------------------------------------------------------------------------------  - OUTPATIENT MEDICATIONS (related to pain management):  -- Long-acting opioid:  None  -- Short-acting opioid: Oxycodone 10mg po   -- Intrathecal pump: none  -- Oral adjuvant(s): Tizanidine 4mg po bid prn muscle spasm  -- Topicals:  Occasional diclofenac patch to hips for bursitis  -- Bowel regimen: none   -- Other relevant medications: none    Verbal consent was given by patient to access pharmacy records and [place] Prescription Monitoring Profile: Yes  Outpatient opioids prescribed by [facility], [place], [place]  Outpatient opioid oral morphine equivalent (OME):  60 mg/day    ASSESSMENT:    Nathalie Bashir has a history of pancreatic cancer, bursitis, arthritis and is preparing for above mentioned surgery.    Patient reports her pain is located in the bilateral hips, neck and shoulders, has been taking oxycodone for about 1 year,  Can perform ALDs but \"takes it easy\" and works slowly on tasks all day. Waling tolerance is good, denies side effects from opioids, denies Etoh, illicits, running out of opioids early.  Discussed multimodal approach to post op pain management, patient is in agreement to a rotation of oral hydromorphone when able to take orals.  Patient does not tolerate acetaminophen. Gabapentin / pregabalin.    Ketamine -denies uncontrolled hypertension, cardiac arrhythmias, PTSD, BPAD, schizophrenia, psychiatric disorders (eg. hallucinations/delirium), history of brain cancer, previous CVA and increased IOP/glaucoma and is open to using ketamine for pain control.     Reviewed and Signed by PAC Pharmacist  Pharmacist: Hamida Aguero PharmD, MS  Date: January 17, 2020  Time:11:24pm    Jackie Sharma PA-C  "

## 2020-01-17 NOTE — PATIENT INSTRUCTIONS
Preparing for Your Surgery      Name:  Nathalie Bashir   MRN:  [id number]   :  1965   Today's Date:  2020     Arriving for surgery:  Surgery date:  2020  Arrival time:  6:00 am  Please come to:     [facility] [address]  [address] [place]  [address]    - ? parking is available in front of the hospital   -    Please proceed to [address] on the 3rd floor. [phone]?  - ?If you are in need of directions, wheelchair or escort please stop at the Information Desk in the lobby.  Inform the information person that you are here for surgery; a wheelchair and escort to [address] will be provided.?     What can I eat or drink?  -  You may have solid food or milk products until 8 hours prior to your surgery.(midnight)  -  You may have water, apple juice or 7up/Sprite until 2 hours prior to your surgery.(until 6 am arrival time)    Which medicines can I take?           Stop Aspirin, vitamins and supplements one week prior to surgery.          Hold Ibuprofen for 24 hours and/or Naproxen for 48 hours prior to surgery.           Hold Sumatriptan (imitrex) for 24 hours before surgery           Hold Diclofenac patch for 24 hours before surgery     -  Please take these medications the day of surgery:  Citalopram (Celexa)  Oxycodone if needed  Fluticasone (flonase)      How do I prepare myself?  -  Take two showers: one the night before surgery; and one the morning of surgery.         Use Scrubcare or Hibiclens to wash from neck down, leave soap on your skin for up to one minute.        Do not get soap in your eyes or ears.  You may use your own shampoo and conditioner; no other hair products.   -  Do NOT use lotion, powder, deodorant, or antiperspirant the day of your surgery.  -  Do NOT wear any makeup, fingernail polish or jewelry.  - Do not bring your own medications to the hospital, except for inhalers and eye   drops.  -  Bring your ID and insurance  card.      Questions or Concerns:    Please call the Pre Admission Nursing Office at [phone].       -If you have health changes between today and your surgery please call your surgeon.     For questions after surgery please call your surgeons office.     Kenzie Ace RN  Care Coordinator - Dr. Kirby Wills and Dr. Yandel Mallory  [phone]           AFTER YOUR SURGERY  Breathing exercises   Breathing exercises help you recover faster. Take deep breaths and let the air out slowly. This will:     Help you wake up after surgery.    Help prevent complications like pneumonia.  Preventing complications will help you go home sooner.   We may give you a breathing device (incentive spirometer) to encourage you to breathe deeply.   Nausea and vomiting   You may feel sick to your stomach after surgery; if so, let your nurse know.    Pain control:  After surgery, you may have pain. Our goal is to help you manage your pain. Pain medicine will help you feel comfortable enough to do activities that will help you heal.  These activities may include breathing exercises, walking and physical therapy.   To help your health care team treat your pain we will ask: 1) If you have pain  2) where it is located 3) describe your pain in your words  Methods of pain control include medications given by mouth, vein or by nerve block for some surgeries.  Sequential Compression Device (SCD):  You may need to wear SCD S (also called pneumo boots)on your legs or feet. These are wraps connected to a machine that pumps in air and releases it. The repeated pumping helps prevent blood clots from forming.

## 2020-01-17 NOTE — H&P
Pre-Operative H & P     CC:  Preoperative exam to assess for increased cardiopulmonary risk while undergoing surgery and anesthesia.    Date of Encounter: 1/17/2020  Primary Care Physician:  Marianne Gonzalez  Reason for Visit: IPMN (intraductal papillary mucinous neoplasm)     ACE Bashir is a 55 y/o female who presents for pre-operative H&P in preparation for Diagnostic laparoscopy; Open Whipple procedure,posssible total pancreatectomy; possible feeding jejunostomy tube with Yandel Mallory MD on 1/28/20 at [facility] for treatment of IPMN (intraductal papillary mucinous neoplasm).     Ms. Bashir has a history of atypical abdominal symptoms and GI complaints including nausea and intermittent diarrhea.  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  While on surveillance, her most recent EUS showed increase in the size of the cyst described as dilation of the main pancreatic duct in the head of the pancreas to 10.4 mm.  MRI 9 mm, and CT 8 mm.  No other suspicious features.  FNA and fluid analysis showed mucinous epithelium, a string sign, high fluid amylase, and normal fluid CEA. She has been counseled on the above procedure.     PMH is also significant for allergic rhinitis, migraines, depression, s/p L4-5 decompression, chronic LBP, leg length discrepancy due to injury, s/p multiple LLE ortho surgeries, C4-7 stenosis w/ RUE radiculopathy, chronic opioid use.     History was obtained from patient & chart review.      Past Medical History  Past Medical History:   Diagnosis Date     Allergic rhinitis      Depression      Lumbago      Migraine      Other chronic pain     low back     Sacroiliitis (H)     low back pain     Trochanteric bursitis     leg length discrrepancy, hip pain       Past Surgical History  Past Surgical History:   Procedure Laterality Date     ARTHROPLASTY HIP Left 2016     BACK SURGERY      L4-5  decompression     C REPAIR DETACH RETINA,SCLERAL BUCKLE       CATARACT IOL, RT/LT       EYE SURGERY       OPEN REDUCTION INTERNAL FIXATION RODDING INTRAMEDULLARY FEMUR Left 12/23/2014    Procedure: OPEN REDUCTION INTERNAL FIXATION RODDING INTRAMEDULLARY FEMUR;  Surgeon: Arnol Santiago MD;  Location: [facility] OR     ORTHOPEDIC SURGERY       REMOVE HARDWARE LOWER EXTREMITY Left 4/7/2015    Procedure: REMOVE HARDWARE LOWER EXTREMITY;  Surgeon: Arnol Santiago MD;  Location: [facility] OR     REMOVE HARDWARE RODDING INTRAMEDULLARY FEMUR Left 12/17/2015    Procedure: REMOVE HARDWARE RODDING INTRAMEDULLARY FEMUR;  Surgeon: Arnol Santiago MD;  Location: [facility] OR     RESECT BONE LOWER EXTREMITY Left 12/23/2014    Procedure: RESECT BONE LOWER EXTREMITY;  Surgeon: Arnol Santiago MD;  Location: [facility] OR       Hx of Blood transfusions/reactions: no     Hx of abnormal bleeding or anti-platelet use: no    Menstrual history: Patient's last menstrual period was 09/23/2014.:      Steroid use in the last year: no    Personal or FH with difficulty with Anesthesia:  no    Prior to Admission Medications  Current Outpatient Medications   Medication Sig Dispense Refill     amitriptyline (ELAVIL) 25 MG tablet Take 3 tablets by mouth At Bedtime       calcium carb 1250 mg, 500 mg [place],/vitamin D 200 units (OSCAL WITH D) 500-200 MG-UNIT per tablet Take 1 tablet by mouth 3 times daily (with meals)       citalopram (CELEXA) 20 MG tablet Take 20 mg by mouth every morning        diclofenac (FLECTOR) 1.3 % patch Place 1 patch onto the skin daily as needed        fluticasone (FLONASE) 50 MCG/ACT nasal spray Spray 2 sprays into both nostrils daily as needed        multivitamin, therapeutic with minerals (MULTI-VITAMIN) TABS Take 1 tablet by mouth daily       oxyCODONE IR (ROXICODONE) 10 MG tablet Take 10 mg by mouth 4 times daily       SUMAtriptan (IMITREX) 100 MG tablet Take 100 mg by mouth as needed       tiZANidine (ZANAFLEX) 4 MG tablet Take 1 tablet  by mouth 2 times daily as needed         Allergies  Allergies   Allergen Reactions     Gluten Meal Palpitations     Pregabalin      interfered with Cymbalta     Acetaminophen Nausea and Other (See Comments)     Urine retention       Amoxicillin Other (See Comments)     ineffective     Dust Mite Extract      Gabapentin GI Disturbance     dizziness     Methadone Fatigue     Mold      Naprosyn [Naproxen] GI Disturbance     dizziness     Oxycontin [Oxycodone]      Confusion, loopy, oxycodone is tolerated     Oxymetholone      Seasonal Allergies      Topiramate Other (See Comments)     Tongue numbness, taste alteration, irritable      Latex Rash       Social History  Social History     Socioeconomic History     Marital status:      Spouse name: Not on file     Number of children: Not on file     Years of education: Not on file     Highest education level: Not on file   Occupational History     Not on file   Social Needs     Financial resource strain: Not on file     Food insecurity:     Worry: Not on file     Inability: Not on file     Transportation needs:     Medical: Not on file     Non-medical: Not on file   Tobacco Use     Smoking status: Current Some Day Smoker     Packs/day: 0.50     Years: 25.00     Pack years: 12.50     Types: Cigarettes     Last attempt to quit: 2010     Years since quittin.4     Smokeless tobacco: Never Used     Tobacco comment: smokes 5-7 cigarettes/day   Substance and Sexual Activity     Alcohol use: No     Drug use: No     Sexual activity: Not on file   Lifestyle     Physical activity:     Days per week: Not on file     Minutes per session: Not on file     Stress: Not on file   Relationships     Social connections:     Talks on phone: Not on file     Gets together: Not on file     Attends Zoroastrianism service: Not on file     Active member of club or organization: Not on file     Attends meetings of clubs or organizations: Not on file     Relationship status: Not on file      Intimate partner violence:     Fear of current or ex partner: Not on file     Emotionally abused: Not on file     Physically abused: Not on file     Forced sexual activity: Not on file   Other Topics Concern     Not on file   Social History Narrative     Not on file       Family History  Family History   Problem Relation Age of Onset     Heart Failure Mother 77     Anesthesia Reaction No family hx of      Deep Vein Thrombosis No family hx of        ROS/MED HX  The complete review of systems is negative other than noted in the HPI or here.  Patient denies recent illness, fever and respiratory infection during past month.  Pt denies steroid use during past year.    ENT/Pulmonary:     (+)allergic rhinitis, tobacco use, Current use 1/2 packs/day  , . .   (-) sleep apnea   Neurologic: Comment: Last used Imitrex this morning. Averages 1 HA per week.    (+)neuropathy - in feet, occasionally in right hand, migraines,    (-) seizures and CVA   Cardiovascular:  - neg cardiovascular ROS   (+) ----. : . . . :. . No previous cardiac testing       METS/Exercise Tolerance: Comment: Activity somewhat limited due to foot pain. Able to walk several blocks w/o stopping. Able to carry laundry up/down stairs. 4 - Raking leaves, gardening   Hematologic:  - neg hematologic  ROS      (-) History of Transfusion   Musculoskeletal: Comment: S/P left hip arthroplasty 2016, revised several months later after dislocation w/ fall.    S/p L4-5 decompression    Leg length discrepancy due to injury, s/p multiple surgeries on LLE.    Chronic neck pain, C4-7 stenosis, some RUE radiculopathy.          GI/Hepatic: Comment: S/P right inguinal hernia repair        Renal/Genitourinary:  - ROS Renal section negative       Endo:  - neg endo ROS    (-) Type II DM and thyroid disease   Psychiatric:     (+) psychiatric history depression      Infectious Disease: Comment: Hip arthroplasty complicated by staph infection, antibiotics x3 mos        Malignancy:     "  - no malignancy   Other: Comment: Takes oxycodone 10 mg 4x/day   (+) H/O Chronic Pain,H/O chronic opiod use ,              PHYSICAL EXAM:   Mental Status/Neuro: A/A/O; Age Appropriate   Airway: Facies: Feasible  Mallampati: I  Mouth/Opening: Full  TM distance: > 6 cm  Neck ROM: Full   Respiratory: Auscultation: CTAB     Resp. Rate: Normal     Resp. Effort: Normal      CV: Rhythm: Regular  Rate: Age appropriate  Heart: Normal Sounds  Edema: None   Comments:      Dental: Normal Dentition              Preop Vitals    BP Readings from Last 3 Encounters:   01/17/20 108/74   12/20/19 114/74   12/17/15 106/68    Pulse Readings from Last 3 Encounters:   01/17/20 93   12/20/19 101   04/26/15 64      Resp Readings from Last 3 Encounters:   01/17/20 20   12/20/19 16   12/17/15 14    SpO2 Readings from Last 3 Encounters:   01/17/20 98%   12/20/19 98%   12/17/15 100%      Temp Readings from Last 1 Encounters:   01/17/20 98.1  F (36.7  C) (Oral)    Ht Readings from Last 1 Encounters:   01/17/20 1.727 m (5' 8\")      Wt Readings from Last 1 Encounters:   01/17/20 88.9 kg (196 lb)    Estimated body mass index is 29.8 kg/m  as calculated from the following:    Height as of this encounter: 1.727 m (5' 8\").    Weight as of this encounter: 88.9 kg (196 lb).     Temp: 98.1  F (36.7  C) Temp src: Oral BP: 108/74 Pulse: 93   Resp: 20 SpO2: 98 %         196 lbs 0 oz  5' 8\"   Body mass index is 29.8 kg/m .    Physical Exam  Constitutional: Awake, alert, cooperative, no apparent distress, and appears stated age.  Eyes: Pupils equal, round and reactive to light, extra ocular muscles intact, sclera clear, conjunctiva normal.  HENT: Normocephalic, oral pharynx with moist mucus membranes, good/fair dentition. No goiter appreciated. No removable dental hardware.  Respiratory: Clear to auscultation bilaterally, no crackles or wheezing. No SOB when supine.  Cardiovascular: Regular rate and rhythm, normal S1 and S2, and no murmur noted.  Carotids " +2, no bruits. No edema. Palpable pulses to radial, DP and PT arteries.   GI: Normal bowel sounds, soft, non-distended, +RUQ tenderness, no masses palpated.    Lymph/Hematologic: No cervical lymphadenopathy and no supraclavicular lymphadenopathy.  Genitourinary:  deferred  Skin: Warm and dry.  No rashes at anticipated surgical site.   Musculoskeletal: Full ROM of neck. There is no redness, warmth, or swelling of the joints. Gross motor strength is normal.    Neurologic: Awake, alert, oriented to name, place and time. Cranial nerves II-XII are grossly intact. Gait is normal. Ambulates from chair to exam table, seats self, lies supine and sits back up w/o assistance.  Neuropsychiatric: Calm, cooperative. Normal affect. Pleasant. Answers questions appropriately, follows commands w/o difficulty.    PRIOR LABS/DIAGNOSTIC STUDIES:  All labs and imaging personally reviewed     CT ABDOMEN PELVIS W CONTRAST, 12/18/2019  IMPRESSION:  Persistent prominence of the pancreatic duct, stable since 2018. In  the setting of presence of mucin on recent biopsy, this is presumed to  represent main duct intraductal papillary mucinous neoplasm. No  aggressive features such as mural nodules are noted.    Labs today: (personally reviewed)  EKG: Personally reviewed but formal cardiology read pending: NSR, Normal ECG, Ventricular rate 86 bpm    CMP, CBC, BNP, T&S  Sodium 135   133 - 144 mmol/L Final 01/17/2020 11:15 AM 1740   Potassium 4.1   3.4 - 5.3 mmol/L Final 01/17/2020 11:15 AM 1740   Chloride 102   94 - 109 mmol/L Final 01/17/2020 11:15 AM 1740   Carbon Dioxide 29   20 - 32 mmol/L Final 01/17/2020 11:15 AM 1740   Anion Gap 4   3 - 14 mmol/L Final 01/17/2020 11:15 AM 1740   Glucose 90   70 - 99 mg/dL Final 01/17/2020 11:15 AM 1740   Urea Nitrogen 10   7 - 30 mg/dL Final 01/17/2020 11:15 AM 1740   Creatinine 0.93   0.52 - 1.04 mg/dL Final 01/17/2020 11:15 AM 1740   GFR Estimate 70   >60 mL/min/ Final 01/17/2020 11:15 AM 1740   Comment:    Non  GFR Calc   Starting 12/18/2018, serum creatinine based estimated GFR (eGFR) will be   calculated using the Chronic Kidney Disease Epidemiology Collaboration   (CKD-EPI) equation.    GFR Estimate If Black 81   >60 mL/min/ Final 01/17/2020 11:15 AM 1740   Comment:    GFR Calc   Starting 12/18/2018, serum creatinine based estimated GFR (eGFR) will be   calculated using the Chronic Kidney Disease Epidemiology Collaboration   (CKD-EPI) equation.    Calcium 9.1   8.5 - 10.1 mg/dL Final 01/17/2020 11:15 AM 1740   Bilirubin Total 0.4   0.2 - 1.3 mg/dL Final 01/17/2020 11:15 AM 1740   Albumin 3.7   3.4 - 5.0 g/dL Final 01/17/2020 11:15 AM 1740   Protein Total 7.1   6.8 - 8.8 g/dL Final 01/17/2020 11:15 AM 1740   Alkaline Phosphatase 106   40 - 150 U/L Final 01/17/2020 11:15 AM 1740   ALT 17   0 - 50 U/L Final 01/17/2020 11:15 AM 1740   AST 12   0 - 45 U/L Final 01/17/2020 11:15 AM 1740     WBC 8.2   4.0 - 11.0 10e9/L Final 01/17/2020 11:15 AM 1740   RBC Count 4.61   3.8 - 5.2 10e12/L Final 01/17/2020 11:15 AM 1740   Hemoglobin 13.7   11.7 - 15.7 g/dL Final 01/17/2020 11:15 AM 1740   Hematocrit 40.9   35.0 - 47.0 % Final 01/17/2020 11:15 AM 1740   MCV 89   78 - 100 fl Final 01/17/2020 11:15 AM 1740   MCH 29.7   26.5 - 33.0 pg Final 01/17/2020 11:15 AM 1740   MCHC 33.5   31.5 - 36.5 g/dL Final 01/17/2020 11:15 AM 1740   RDW 13.7   10.0 - 15.0 % Final 01/17/2020 11:15 AM 1740   Platelet Count 346   150 - 450 10e9/L Final 01/17/2020 11:15 AM 1740     N-Terminal Pro Bnp 184  High   0 - 125 pg/mL Final 01/17/2020 11:16 AM 1740     Outside records reviewed from: Care Everywhere    ASSESSMENT and PLAN  Nathalie Bashir is a 54 year old female scheduled to undergo Diagnostic laparoscopy; Open Whipple procedure,posssible total pancreatectomy; possible feeding jejunostomy tube with Yandel Mallory MD on 1/28/20 at [facility] for treatment of IPMN (intraductal  papillary mucinous neoplasm)    Pt has had prior anesthetic. Type: General, MAC and Regional    No history of anesthetic complications    She has the following specific operative considerations:   # LISHA 1/8 = low risk  # VTE risk: 0.26%  # Risk of PONV score = 2.  If > 2, anti-emetic intervention recommended.  # Anesthesia considerations:  Refer to PAC assessment in anesthesia records    # On chronic opiates, taking oxycodone 10 mg 4x/day. See pharmacy note regarding periop pain management.    CARDIAC: METS 4,  Activity somewhat limited due to foot pain. Able to walk several blocks w/o stopping. Able to carry laundry up/down stairs.     # RCRI : High risk surgery (>5% cardiac complication risk).  0.9% risk of major adverse cardiac event.     #  EKG today: NSR, Normal ECG, Ventricular rate 86 bpm     #  BNP today: 184    PULMONARY:     # Current smoker, 12.5 pk yr hx, smokes 1/2 pk/day    # No asthma or inhaler use    GI: no GERD      ENDO: BMI 29    # intraductal papillary mucinous neoplasm    # No DM    ORTHO: Full neck ROM, no TMJ    # C4-7 stenosis w/ RUE radiculopathy. Use caution w/ head/neck position.    # Leg length discrepancy due to injury, s/p multiple LLE ortho surgeries    # s/p L4-5 decompression    NEURO/PSYCH:     # Migraines, averages 1x/week, last used Imitrex this morning     Patient was discussed with Dr Beckford. Patient is optimized and is acceptable candidate for the proposed procedure. No further diagnostic evaluation is needed.    Arrival time, NPO, shower and medication instructions provided by nursing staff today.  Preparing For Your Surgery handout given.    Jackie Sharma PA-C  Preoperative Assessment Center  [facility]  Clinic and [facility] Center  Phone: [phone]  Fax: [phone]

## 2020-01-20 LAB — INTERPRETATION ECG - MUSE: NORMAL

## 2020-01-28 ENCOUNTER — HOSPITAL ENCOUNTER (INPATIENT)
Facility: CLINIC | Age: 55
LOS: 7 days | Discharge: HOME | DRG: 327 | End: 2020-02-04
Attending: SURGERY | Admitting: SURGERY
Payer: MEDICARE

## 2020-01-28 ENCOUNTER — ANESTHESIA (OUTPATIENT)
Dept: SURGERY | Facility: CLINIC | Age: 55
DRG: 327 | End: 2020-01-28
Payer: MEDICARE

## 2020-01-28 ENCOUNTER — ANCILLARY PROCEDURE (OUTPATIENT)
Dept: ULTRASOUND IMAGING | Facility: CLINIC | Age: 55
End: 2020-01-28
Attending: ANESTHESIOLOGY
Payer: MEDICARE

## 2020-01-28 DIAGNOSIS — C16.3 MALIGNANT NEOPLASM OF PYLORIC ANTRUM (H): Primary | ICD-10-CM

## 2020-01-28 DIAGNOSIS — D49.0 IPMN (INTRADUCTAL PAPILLARY MUCINOUS NEOPLASM): ICD-10-CM

## 2020-01-28 LAB
ABO + RH BLD: NORMAL
ABO + RH BLD: NORMAL
ALBUMIN SERPL-MCNC: 2.9 G/DL (ref 3.4–5)
ALP SERPL-CCNC: 102 U/L (ref 40–150)
ALT SERPL W P-5'-P-CCNC: 39 U/L (ref 0–50)
ANION GAP SERPL CALCULATED.3IONS-SCNC: <1 MMOL/L (ref 3–14)
AST SERPL W P-5'-P-CCNC: 47 U/L (ref 0–45)
BASOPHILS # BLD AUTO: 0 10E9/L (ref 0–0.2)
BASOPHILS NFR BLD AUTO: 0.2 %
BILIRUB SERPL-MCNC: 0.5 MG/DL (ref 0.2–1.3)
BLD GP AB SCN SERPL QL: NORMAL
BLOOD BANK CMNT PATIENT-IMP: NORMAL
BLOOD BANK CMNT PATIENT-IMP: NORMAL
BUN SERPL-MCNC: 9 MG/DL (ref 7–30)
CALCIUM SERPL-MCNC: 7.9 MG/DL (ref 8.5–10.1)
CHLORIDE SERPL-SCNC: 110 MMOL/L (ref 94–109)
CO2 SERPL-SCNC: 30 MMOL/L (ref 20–32)
CREAT SERPL-MCNC: 0.78 MG/DL (ref 0.52–1.04)
DIFFERENTIAL METHOD BLD: ABNORMAL
EOSINOPHIL # BLD AUTO: 0 10E9/L (ref 0–0.7)
EOSINOPHIL NFR BLD AUTO: 0.2 %
ERYTHROCYTE [DISTWIDTH] IN BLOOD BY AUTOMATED COUNT: 14 % (ref 10–15)
GFR SERPL CREATININE-BSD FRML MDRD: 87 ML/MIN/{1.73_M2}
GLUCOSE BLDC GLUCOMTR-MCNC: 104 MG/DL (ref 70–99)
GLUCOSE BLDC GLUCOMTR-MCNC: 176 MG/DL (ref 70–99)
GLUCOSE BLDC GLUCOMTR-MCNC: 181 MG/DL (ref 70–99)
GLUCOSE SERPL-MCNC: 199 MG/DL (ref 70–99)
HCT VFR BLD AUTO: 37.3 % (ref 35–47)
HGB BLD-MCNC: 12 G/DL (ref 11.7–15.7)
HGB BLD-MCNC: 12.3 G/DL (ref 11.7–15.7)
IMM GRANULOCYTES # BLD: 0 10E9/L (ref 0–0.4)
IMM GRANULOCYTES NFR BLD: 0.3 %
INR PPP: 1.04 (ref 0.86–1.14)
LYMPHOCYTES # BLD AUTO: 0.5 10E9/L (ref 0.8–5.3)
LYMPHOCYTES NFR BLD AUTO: 3.2 %
MCH RBC QN AUTO: 29 PG (ref 26.5–33)
MCHC RBC AUTO-ENTMCNC: 33 G/DL (ref 31.5–36.5)
MCV RBC AUTO: 88 FL (ref 78–100)
MONOCYTES # BLD AUTO: 0.6 10E9/L (ref 0–1.3)
MONOCYTES NFR BLD AUTO: 4.2 %
NEUTROPHILS # BLD AUTO: 13.7 10E9/L (ref 1.6–8.3)
NEUTROPHILS NFR BLD AUTO: 91.9 %
NRBC # BLD AUTO: 0 10*3/UL
NRBC BLD AUTO-RTO: 0 /100
NT-PROBNP SERPL-MCNC: 247 PG/ML (ref 0–900)
PLATELET # BLD AUTO: 291 10E9/L (ref 150–450)
POTASSIUM SERPL-SCNC: 3.9 MMOL/L (ref 3.4–5.3)
PROT SERPL-MCNC: 5.6 G/DL (ref 6.8–8.8)
RBC # BLD AUTO: 4.24 10E12/L (ref 3.8–5.2)
SODIUM SERPL-SCNC: 139 MMOL/L (ref 133–144)
SPECIMEN EXP DATE BLD: NORMAL
WBC # BLD AUTO: 14.9 10E9/L (ref 4–11)

## 2020-01-28 PROCEDURE — 36000070 ZZH SURGERY LEVEL 5 EA 15 ADDTL MIN - UMMC: Performed by: SURGERY

## 2020-01-28 PROCEDURE — 25800030 ZZH RX IP 258 OP 636: Performed by: SURGERY

## 2020-01-28 PROCEDURE — C9290 INJ, BUPIVACAINE LIPOSOME: HCPCS | Performed by: ANESTHESIOLOGY

## 2020-01-28 PROCEDURE — 25000128 H RX IP 250 OP 636: Performed by: ANESTHESIOLOGY

## 2020-01-28 PROCEDURE — 88304 TISSUE EXAM BY PATHOLOGIST: CPT | Performed by: SURGERY

## 2020-01-28 PROCEDURE — 36000068 ZZH SURGERY LEVEL 5 1ST 30 MIN - UMMC: Performed by: SURGERY

## 2020-01-28 PROCEDURE — 71000014 ZZH RECOVERY PHASE 1 LEVEL 2 FIRST HR: Performed by: SURGERY

## 2020-01-28 PROCEDURE — 80053 COMPREHEN METABOLIC PANEL: CPT | Performed by: SURGERY

## 2020-01-28 PROCEDURE — 0FT40ZZ RESECTION OF GALLBLADDER, OPEN APPROACH: ICD-10-PCS | Performed by: SURGERY

## 2020-01-28 PROCEDURE — 25000125 ZZHC RX 250: Performed by: STUDENT IN AN ORGANIZED HEALTH CARE EDUCATION/TRAINING PROGRAM

## 2020-01-28 PROCEDURE — 25000128 H RX IP 250 OP 636: Performed by: SURGERY

## 2020-01-28 PROCEDURE — 0FBG0ZZ EXCISION OF PANCREAS, OPEN APPROACH: ICD-10-PCS | Performed by: SURGERY

## 2020-01-28 PROCEDURE — 27210794 ZZH OR GENERAL SUPPLY STERILE: Performed by: SURGERY

## 2020-01-28 PROCEDURE — 00000159 ZZHCL STATISTIC H-SEND OUTS PREP: Performed by: SURGERY

## 2020-01-28 PROCEDURE — 88331 PATH CONSLTJ SURG 1 BLK 1SPC: CPT | Performed by: SURGERY

## 2020-01-28 PROCEDURE — 07BD0ZX EXCISION OF AORTIC LYMPHATIC, OPEN APPROACH, DIAGNOSTIC: ICD-10-PCS | Performed by: SURGERY

## 2020-01-28 PROCEDURE — 37000009 ZZH ANESTHESIA TECHNICAL FEE, EACH ADDTL 15 MIN: Performed by: SURGERY

## 2020-01-28 PROCEDURE — C1758 CATHETER, URETERAL: HCPCS | Performed by: SURGERY

## 2020-01-28 PROCEDURE — 40000014 ZZH STATISTIC ARTERIAL MONITORING DAILY

## 2020-01-28 PROCEDURE — 40000170 ZZH STATISTIC PRE-PROCEDURE ASSESSMENT II: Performed by: SURGERY

## 2020-01-28 PROCEDURE — 0DT90ZZ RESECTION OF DUODENUM, OPEN APPROACH: ICD-10-PCS | Performed by: SURGERY

## 2020-01-28 PROCEDURE — 85025 COMPLETE CBC W/AUTO DIFF WBC: CPT | Performed by: SURGERY

## 2020-01-28 PROCEDURE — 00000146 ZZHCL STATISTIC GLUCOSE BY METER IP

## 2020-01-28 PROCEDURE — 85018 HEMOGLOBIN: CPT | Performed by: ANESTHESIOLOGY

## 2020-01-28 PROCEDURE — 88377 M/PHMTRC ALYS ISHQUANT/SEMIQ: CPT | Performed by: PATHOLOGY

## 2020-01-28 PROCEDURE — 00000158 ZZHCL STATISTIC H-FISH PROCESS B/S: Performed by: SURGERY

## 2020-01-28 PROCEDURE — 88332 PATH CONSLTJ SURG EA ADD BLK: CPT | Performed by: SURGERY

## 2020-01-28 PROCEDURE — 88307 TISSUE EXAM BY PATHOLOGIST: CPT | Performed by: SURGERY

## 2020-01-28 PROCEDURE — 25000128 H RX IP 250 OP 636

## 2020-01-28 PROCEDURE — 25000131 ZZH RX MED GY IP 250 OP 636 PS 637: Mod: GY | Performed by: SURGERY

## 2020-01-28 PROCEDURE — 12000001 ZZH R&B MED SURG/OB UMMC

## 2020-01-28 PROCEDURE — 0DUA07Z SUPPLEMENT JEJUNUM WITH AUTOLOGOUS TISSUE SUBSTITUTE, OPEN APPROACH: ICD-10-PCS | Performed by: SURGERY

## 2020-01-28 PROCEDURE — 0FB90ZZ EXCISION OF COMMON BILE DUCT, OPEN APPROACH: ICD-10-PCS | Performed by: SURGERY

## 2020-01-28 PROCEDURE — 88309 TISSUE EXAM BY PATHOLOGIST: CPT | Performed by: SURGERY

## 2020-01-28 PROCEDURE — 40000275 ZZH STATISTIC RCP TIME EA 10 MIN

## 2020-01-28 PROCEDURE — 85610 PROTHROMBIN TIME: CPT | Performed by: SURGERY

## 2020-01-28 PROCEDURE — 25000566 ZZH SEVOFLURANE, EA 15 MIN: Performed by: SURGERY

## 2020-01-28 PROCEDURE — 25000128 H RX IP 250 OP 636: Performed by: STUDENT IN AN ORGANIZED HEALTH CARE EDUCATION/TRAINING PROGRAM

## 2020-01-28 PROCEDURE — 25000125 ZZHC RX 250: Performed by: ANESTHESIOLOGY

## 2020-01-28 PROCEDURE — 37000008 ZZH ANESTHESIA TECHNICAL FEE, 1ST 30 MIN: Performed by: SURGERY

## 2020-01-28 PROCEDURE — 83880 ASSAY OF NATRIURETIC PEPTIDE: CPT | Performed by: SURGERY

## 2020-01-28 PROCEDURE — 0DB60ZZ EXCISION OF STOMACH, OPEN APPROACH: ICD-10-PCS | Performed by: SURGERY

## 2020-01-28 PROCEDURE — 36415 COLL VENOUS BLD VENIPUNCTURE: CPT | Performed by: ANESTHESIOLOGY

## 2020-01-28 PROCEDURE — 25800030 ZZH RX IP 258 OP 636: Performed by: STUDENT IN AN ORGANIZED HEALTH CARE EDUCATION/TRAINING PROGRAM

## 2020-01-28 PROCEDURE — 71000015 ZZH RECOVERY PHASE 1 LEVEL 2 EA ADDTL HR: Performed by: SURGERY

## 2020-01-28 PROCEDURE — 3E0T3BZ INTRODUCTION OF ANESTHETIC AGENT INTO PERIPHERAL NERVES AND PLEXI, PERCUTANEOUS APPROACH: ICD-10-PCS | Performed by: ANESTHESIOLOGY

## 2020-01-28 RX ORDER — LIDOCAINE 40 MG/G
CREAM TOPICAL
Status: DISCONTINUED | OUTPATIENT
Start: 2020-01-28 | End: 2020-02-04 | Stop reason: HOSPADM

## 2020-01-28 RX ORDER — LABETALOL 20 MG/4 ML (5 MG/ML) INTRAVENOUS SYRINGE
10
Status: DISCONTINUED | OUTPATIENT
Start: 2020-01-28 | End: 2020-01-28 | Stop reason: HOSPADM

## 2020-01-28 RX ORDER — BUPIVACAINE HYDROCHLORIDE 2.5 MG/ML
INJECTION, SOLUTION EPIDURAL; INFILTRATION; INTRACAUDAL PRN
Status: DISCONTINUED | OUTPATIENT
Start: 2020-01-28 | End: 2020-01-28

## 2020-01-28 RX ORDER — CEFOXITIN 2 G/1
2 INJECTION, POWDER, FOR SOLUTION INTRAVENOUS
Status: COMPLETED | OUTPATIENT
Start: 2020-01-28 | End: 2020-01-28

## 2020-01-28 RX ORDER — ONDANSETRON 2 MG/ML
4 INJECTION INTRAMUSCULAR; INTRAVENOUS EVERY 6 HOURS PRN
Status: DISCONTINUED | OUTPATIENT
Start: 2020-01-28 | End: 2020-02-04 | Stop reason: HOSPADM

## 2020-01-28 RX ORDER — FLUMAZENIL 0.1 MG/ML
0.2 INJECTION, SOLUTION INTRAVENOUS
Status: DISCONTINUED | OUTPATIENT
Start: 2020-01-28 | End: 2020-01-28 | Stop reason: HOSPADM

## 2020-01-28 RX ORDER — DEXTROSE MONOHYDRATE 25 G/50ML
25-50 INJECTION, SOLUTION INTRAVENOUS
Status: DISCONTINUED | OUTPATIENT
Start: 2020-01-28 | End: 2020-02-04 | Stop reason: HOSPADM

## 2020-01-28 RX ORDER — ONDANSETRON 2 MG/ML
4 INJECTION INTRAMUSCULAR; INTRAVENOUS EVERY 30 MIN PRN
Status: DISCONTINUED | OUTPATIENT
Start: 2020-01-28 | End: 2020-01-28 | Stop reason: HOSPADM

## 2020-01-28 RX ORDER — DEXAMETHASONE SODIUM PHOSPHATE 4 MG/ML
INJECTION, SOLUTION INTRA-ARTICULAR; INTRALESIONAL; INTRAMUSCULAR; INTRAVENOUS; SOFT TISSUE PRN
Status: DISCONTINUED | OUTPATIENT
Start: 2020-01-28 | End: 2020-01-28

## 2020-01-28 RX ORDER — FENTANYL CITRATE 50 UG/ML
25-50 INJECTION, SOLUTION INTRAMUSCULAR; INTRAVENOUS
Status: DISCONTINUED | OUTPATIENT
Start: 2020-01-28 | End: 2020-01-28 | Stop reason: HOSPADM

## 2020-01-28 RX ORDER — CITALOPRAM HYDROBROMIDE 20 MG/1
20 TABLET ORAL EVERY MORNING
Status: DISCONTINUED | OUTPATIENT
Start: 2020-01-29 | End: 2020-02-04 | Stop reason: HOSPADM

## 2020-01-28 RX ORDER — SODIUM CHLORIDE, SODIUM LACTATE, POTASSIUM CHLORIDE, CALCIUM CHLORIDE 600; 310; 30; 20 MG/100ML; MG/100ML; MG/100ML; MG/100ML
INJECTION, SOLUTION INTRAVENOUS CONTINUOUS
Status: DISCONTINUED | OUTPATIENT
Start: 2020-01-28 | End: 2020-01-29

## 2020-01-28 RX ORDER — AMITRIPTYLINE HYDROCHLORIDE 75 MG/1
75 TABLET ORAL AT BEDTIME
Status: DISCONTINUED | OUTPATIENT
Start: 2020-01-28 | End: 2020-02-04 | Stop reason: HOSPADM

## 2020-01-28 RX ORDER — PROPOFOL 10 MG/ML
INJECTION, EMULSION INTRAVENOUS PRN
Status: DISCONTINUED | OUTPATIENT
Start: 2020-01-28 | End: 2020-01-28

## 2020-01-28 RX ORDER — NALOXONE HYDROCHLORIDE 0.4 MG/ML
.1-.4 INJECTION, SOLUTION INTRAMUSCULAR; INTRAVENOUS; SUBCUTANEOUS
Status: DISCONTINUED | OUTPATIENT
Start: 2020-01-28 | End: 2020-02-04 | Stop reason: HOSPADM

## 2020-01-28 RX ORDER — FLUTICASONE PROPIONATE 50 MCG
2 SPRAY, SUSPENSION (ML) NASAL DAILY PRN
Status: DISCONTINUED | OUTPATIENT
Start: 2020-01-28 | End: 2020-02-04 | Stop reason: HOSPADM

## 2020-01-28 RX ORDER — NICOTINE POLACRILEX 4 MG
15-30 LOZENGE BUCCAL
Status: DISCONTINUED | OUTPATIENT
Start: 2020-01-28 | End: 2020-02-04 | Stop reason: HOSPADM

## 2020-01-28 RX ORDER — EPHEDRINE SULFATE 50 MG/ML
INJECTION, SOLUTION INTRAMUSCULAR; INTRAVENOUS; SUBCUTANEOUS PRN
Status: DISCONTINUED | OUTPATIENT
Start: 2020-01-28 | End: 2020-01-28

## 2020-01-28 RX ORDER — DEXMEDETOMIDINE HYDROCHLORIDE 4 UG/ML
0.2-0.7 INJECTION, SOLUTION INTRAVENOUS CONTINUOUS
Status: DISCONTINUED | OUTPATIENT
Start: 2020-01-28 | End: 2020-01-28 | Stop reason: HOSPADM

## 2020-01-28 RX ORDER — HYDROMORPHONE HYDROCHLORIDE 1 MG/ML
.3-.5 INJECTION, SOLUTION INTRAMUSCULAR; INTRAVENOUS; SUBCUTANEOUS EVERY 5 MIN PRN
Status: DISCONTINUED | OUTPATIENT
Start: 2020-01-28 | End: 2020-01-28 | Stop reason: HOSPADM

## 2020-01-28 RX ORDER — LIDOCAINE HYDROCHLORIDE 20 MG/ML
INJECTION, SOLUTION INFILTRATION; PERINEURAL PRN
Status: DISCONTINUED | OUTPATIENT
Start: 2020-01-28 | End: 2020-01-28

## 2020-01-28 RX ORDER — CEFOXITIN 1 G/1
INJECTION, POWDER, FOR SOLUTION INTRAVENOUS PRN
Status: DISCONTINUED | OUTPATIENT
Start: 2020-01-28 | End: 2020-01-28

## 2020-01-28 RX ORDER — ONDANSETRON 2 MG/ML
INJECTION INTRAMUSCULAR; INTRAVENOUS PRN
Status: DISCONTINUED | OUTPATIENT
Start: 2020-01-28 | End: 2020-01-28

## 2020-01-28 RX ORDER — SODIUM CHLORIDE, SODIUM LACTATE, POTASSIUM CHLORIDE, CALCIUM CHLORIDE 600; 310; 30; 20 MG/100ML; MG/100ML; MG/100ML; MG/100ML
INJECTION, SOLUTION INTRAVENOUS CONTINUOUS
Status: DISCONTINUED | OUTPATIENT
Start: 2020-01-28 | End: 2020-01-28 | Stop reason: HOSPADM

## 2020-01-28 RX ORDER — NALOXONE HYDROCHLORIDE 0.4 MG/ML
.1-.4 INJECTION, SOLUTION INTRAMUSCULAR; INTRAVENOUS; SUBCUTANEOUS
Status: DISCONTINUED | OUTPATIENT
Start: 2020-01-28 | End: 2020-01-28 | Stop reason: HOSPADM

## 2020-01-28 RX ORDER — LIDOCAINE 40 MG/G
CREAM TOPICAL
Status: DISCONTINUED | OUTPATIENT
Start: 2020-01-28 | End: 2020-01-28 | Stop reason: HOSPADM

## 2020-01-28 RX ORDER — KETAMINE HYDROCHLORIDE 10 MG/ML
INJECTION, SOLUTION INTRAMUSCULAR; INTRAVENOUS PRN
Status: DISCONTINUED | OUTPATIENT
Start: 2020-01-28 | End: 2020-01-28

## 2020-01-28 RX ORDER — PROCHLORPERAZINE MALEATE 5 MG
10 TABLET ORAL EVERY 6 HOURS PRN
Status: DISCONTINUED | OUTPATIENT
Start: 2020-01-28 | End: 2020-02-04 | Stop reason: HOSPADM

## 2020-01-28 RX ORDER — NALOXONE HYDROCHLORIDE 0.4 MG/ML
.1-.4 INJECTION, SOLUTION INTRAMUSCULAR; INTRAVENOUS; SUBCUTANEOUS
Status: DISCONTINUED | OUTPATIENT
Start: 2020-01-28 | End: 2020-01-28

## 2020-01-28 RX ORDER — SODIUM CHLORIDE, SODIUM LACTATE, POTASSIUM CHLORIDE, CALCIUM CHLORIDE 600; 310; 30; 20 MG/100ML; MG/100ML; MG/100ML; MG/100ML
INJECTION, SOLUTION INTRAVENOUS CONTINUOUS PRN
Status: DISCONTINUED | OUTPATIENT
Start: 2020-01-28 | End: 2020-01-28

## 2020-01-28 RX ORDER — ONDANSETRON 4 MG/1
4 TABLET, ORALLY DISINTEGRATING ORAL EVERY 6 HOURS PRN
Status: DISCONTINUED | OUTPATIENT
Start: 2020-01-28 | End: 2020-02-04 | Stop reason: HOSPADM

## 2020-01-28 RX ORDER — ONDANSETRON 4 MG/1
4 TABLET, ORALLY DISINTEGRATING ORAL EVERY 30 MIN PRN
Status: DISCONTINUED | OUTPATIENT
Start: 2020-01-28 | End: 2020-01-28 | Stop reason: HOSPADM

## 2020-01-28 RX ORDER — HYDROXYZINE HYDROCHLORIDE 10 MG/1
10 TABLET, FILM COATED ORAL 3 TIMES DAILY PRN
Status: DISCONTINUED | OUTPATIENT
Start: 2020-01-28 | End: 2020-02-03

## 2020-01-28 RX ORDER — FENTANYL CITRATE 50 UG/ML
INJECTION, SOLUTION INTRAMUSCULAR; INTRAVENOUS PRN
Status: DISCONTINUED | OUTPATIENT
Start: 2020-01-28 | End: 2020-01-28

## 2020-01-28 RX ADMIN — CEFOXITIN SODIUM 1 G: 1 POWDER, FOR SOLUTION INTRAVENOUS at 12:32

## 2020-01-28 RX ADMIN — HYDROMORPHONE HYDROCHLORIDE 0.5 MG: 1 INJECTION, SOLUTION INTRAMUSCULAR; INTRAVENOUS; SUBCUTANEOUS at 15:49

## 2020-01-28 RX ADMIN — BUPIVACAINE 20 ML: 13.3 INJECTION, SUSPENSION, LIPOSOMAL INFILTRATION at 08:20

## 2020-01-28 RX ADMIN — LIDOCAINE HYDROCHLORIDE 100 MG: 20 INJECTION, SOLUTION INFILTRATION; PERINEURAL at 08:14

## 2020-01-28 RX ADMIN — BUPIVACAINE HYDROCHLORIDE 20 ML: 2.5 INJECTION, SOLUTION EPIDURAL; INFILTRATION; INTRACAUDAL; PERINEURAL at 08:20

## 2020-01-28 RX ADMIN — ROCURONIUM BROMIDE 20 MG: 10 INJECTION INTRAVENOUS at 12:34

## 2020-01-28 RX ADMIN — Medication 20 MG: at 09:38

## 2020-01-28 RX ADMIN — FENTANYL CITRATE 25 MCG: 50 INJECTION, SOLUTION INTRAMUSCULAR; INTRAVENOUS at 15:10

## 2020-01-28 RX ADMIN — FENTANYL CITRATE 50 MCG: 50 INJECTION, SOLUTION INTRAMUSCULAR; INTRAVENOUS at 15:49

## 2020-01-28 RX ADMIN — ROCURONIUM BROMIDE 10 MG: 10 INJECTION INTRAVENOUS at 14:07

## 2020-01-28 RX ADMIN — ROCURONIUM BROMIDE 10 MG: 10 INJECTION INTRAVENOUS at 10:40

## 2020-01-28 RX ADMIN — INSULIN ASPART 1 UNITS: 100 INJECTION, SOLUTION INTRAVENOUS; SUBCUTANEOUS at 20:00

## 2020-01-28 RX ADMIN — SODIUM CHLORIDE, POTASSIUM CHLORIDE, SODIUM LACTATE AND CALCIUM CHLORIDE: 600; 310; 30; 20 INJECTION, SOLUTION INTRAVENOUS at 16:00

## 2020-01-28 RX ADMIN — ROCURONIUM BROMIDE 100 MG: 10 INJECTION INTRAVENOUS at 08:14

## 2020-01-28 RX ADMIN — FENTANYL CITRATE 100 MCG: 50 INJECTION, SOLUTION INTRAMUSCULAR; INTRAVENOUS at 08:36

## 2020-01-28 RX ADMIN — PHENYLEPHRINE HYDROCHLORIDE 200 MCG: 10 INJECTION INTRAVENOUS at 11:52

## 2020-01-28 RX ADMIN — INSULIN ASPART 1 UNITS: 100 INJECTION, SOLUTION INTRAVENOUS; SUBCUTANEOUS at 23:52

## 2020-01-28 RX ADMIN — HYDROMORPHONE HYDROCHLORIDE 0.5 MG: 1 INJECTION, SOLUTION INTRAMUSCULAR; INTRAVENOUS; SUBCUTANEOUS at 11:14

## 2020-01-28 RX ADMIN — DEXMEDETOMIDINE HYDROCHLORIDE 0.2 MCG/KG/HR: 4 INJECTION, SOLUTION INTRAVENOUS at 08:45

## 2020-01-28 RX ADMIN — HYDROMORPHONE HYDROCHLORIDE 0.5 MG: 1 INJECTION, SOLUTION INTRAMUSCULAR; INTRAVENOUS; SUBCUTANEOUS at 16:39

## 2020-01-28 RX ADMIN — PHENYLEPHRINE HYDROCHLORIDE 0.1 MCG/KG/MIN: 10 INJECTION INTRAVENOUS at 12:16

## 2020-01-28 RX ADMIN — ONDANSETRON 4 MG: 2 INJECTION INTRAMUSCULAR; INTRAVENOUS at 15:07

## 2020-01-28 RX ADMIN — FENTANYL CITRATE 25 MCG: 50 INJECTION, SOLUTION INTRAMUSCULAR; INTRAVENOUS at 15:43

## 2020-01-28 RX ADMIN — ONDANSETRON 4 MG: 2 INJECTION INTRAMUSCULAR; INTRAVENOUS at 14:29

## 2020-01-28 RX ADMIN — HYDROMORPHONE HYDROCHLORIDE 0.5 MG: 1 INJECTION, SOLUTION INTRAMUSCULAR; INTRAVENOUS; SUBCUTANEOUS at 15:57

## 2020-01-28 RX ADMIN — FENTANYL CITRATE 150 MCG: 50 INJECTION, SOLUTION INTRAMUSCULAR; INTRAVENOUS at 08:14

## 2020-01-28 RX ADMIN — FENTANYL CITRATE 25 MCG: 50 INJECTION, SOLUTION INTRAMUSCULAR; INTRAVENOUS at 15:13

## 2020-01-28 RX ADMIN — CEFOXITIN SODIUM 1 G: 1 POWDER, FOR SOLUTION INTRAVENOUS at 14:27

## 2020-01-28 RX ADMIN — Medication: at 16:18

## 2020-01-28 RX ADMIN — FENTANYL CITRATE 50 MCG: 50 INJECTION, SOLUTION INTRAMUSCULAR; INTRAVENOUS at 15:29

## 2020-01-28 RX ADMIN — HYDROMORPHONE HYDROCHLORIDE 0.5 MG: 1 INJECTION, SOLUTION INTRAMUSCULAR; INTRAVENOUS; SUBCUTANEOUS at 16:11

## 2020-01-28 RX ADMIN — PROPOFOL 100 MG: 10 INJECTION, EMULSION INTRAVENOUS at 08:14

## 2020-01-28 RX ADMIN — DEXAMETHASONE SODIUM PHOSPHATE 8 MG: 4 INJECTION, SOLUTION INTRA-ARTICULAR; INTRALESIONAL; INTRAMUSCULAR; INTRAVENOUS; SOFT TISSUE at 10:29

## 2020-01-28 RX ADMIN — FENTANYL CITRATE 25 MCG: 50 INJECTION, SOLUTION INTRAMUSCULAR; INTRAVENOUS at 15:18

## 2020-01-28 RX ADMIN — CEFOXITIN SODIUM 1 G: 1 POWDER, FOR SOLUTION INTRAVENOUS at 10:29

## 2020-01-28 RX ADMIN — HYDROMORPHONE HYDROCHLORIDE 0.5 MG: 1 INJECTION, SOLUTION INTRAMUSCULAR; INTRAVENOUS; SUBCUTANEOUS at 14:16

## 2020-01-28 RX ADMIN — SUGAMMADEX 200 MG: 100 INJECTION, SOLUTION INTRAVENOUS at 14:37

## 2020-01-28 RX ADMIN — CEFOXITIN SODIUM 2 G: 2 POWDER, FOR SOLUTION INTRAVENOUS at 08:28

## 2020-01-28 RX ADMIN — PHENYLEPHRINE HYDROCHLORIDE 100 MCG: 10 INJECTION INTRAVENOUS at 12:00

## 2020-01-28 RX ADMIN — KETAMINE HYDROCHLORIDE 5 MG/HR: 100 INJECTION, SOLUTION, CONCENTRATE INTRAMUSCULAR; INTRAVENOUS at 08:45

## 2020-01-28 RX ADMIN — SODIUM CHLORIDE, POTASSIUM CHLORIDE, SODIUM LACTATE AND CALCIUM CHLORIDE: 600; 310; 30; 20 INJECTION, SOLUTION INTRAVENOUS at 07:59

## 2020-01-28 RX ADMIN — SODIUM CHLORIDE, POTASSIUM CHLORIDE, SODIUM LACTATE AND CALCIUM CHLORIDE: 600; 310; 30; 20 INJECTION, SOLUTION INTRAVENOUS at 23:53

## 2020-01-28 RX ADMIN — FENTANYL CITRATE 25 MCG: 50 INJECTION, SOLUTION INTRAMUSCULAR; INTRAVENOUS at 15:21

## 2020-01-28 RX ADMIN — FENTANYL CITRATE 25 MCG: 50 INJECTION, SOLUTION INTRAMUSCULAR; INTRAVENOUS at 15:38

## 2020-01-28 RX ADMIN — Medication 5 MG: at 12:25

## 2020-01-28 ASSESSMENT — MIFFLIN-ST. JEOR: SCORE: 1556.37

## 2020-01-28 ASSESSMENT — ACTIVITIES OF DAILY LIVING (ADL): ADLS_ACUITY_SCORE: 14

## 2020-01-28 NOTE — BRIEF OP NOTE
[facility], [place]    Brief Operative Note    Pre-operative diagnosis: IPMN (intraductal papillary mucinous neoplasm) [D49.0]  Post-operative diagnosis Same as pre-operative diagnosis    Procedure: Procedure(s):  Open Whipple procedure and Cholecystectomy  Surgeon: Surgeon(s) and Role:     * Yandel Mallory MD - Primary     * Ryley Chirinos MD - Assisting     * Doe Main MD - Resident - Assisting     * Abdi Rodriguez MD - Resident - Observing  Anesthesia: General   Estimated blood loss: See OP note  Drains: JAZZMINE x2  Specimens:   ID Type Source Tests Collected by Time Destination   A : hepatic artery lymph node Tissue Lymph Node SURGICAL PATHOLOGY EXAM Yandel Mallory MD 1/28/2020 10:05 AM    B : gallbladder Organ Gallbladder and Contents SURGICAL PATHOLOGY EXAM Yandel Mallory MD 1/28/2020 10:33 AM    C : whipple-single tail marks pancreatic margin, double tail marks bile duct margin Tissue Other SURGICAL PATHOLOGY EXAM Yandel Mallory MD 1/28/2020 11:50 AM      Findings:   Whipple for IPMN, negative frozen margin on pancreas.  Complications: None.  Implants: * No implants in log *    Doe Main MD PGY-5  Surgery Resident  Pg [phone]

## 2020-01-28 NOTE — PROGRESS NOTES
PACU RN NOTE  Assigned as pt nurse while pt recovering in PACU  In pt chart accessing medical record in order to obtain knowledge of pt health status/history in order to provide safe nursing care to pt post procedure    ARRIVAL TO PACU @ 0020-0280  Received report   Pt attached to monitor VS obtained recorded and monitored while in pacu  Tele and art strip printed and placed in written chart   Adult assessment completed and charted     PIV x3 total:  PIV x2 in Right Arm. Both SL on arrival   PIV x1 in Left arm infusing.     Left PIV:  LR @ 100 ml/hr from 0253-6541   (stopped @ 1600)  LR @ 125   (started @ 1600)  Dilaudid PCA setup @ 1618. In line with LR @ 125 ml/hr. Button not handed to pt yet. Setup verified by Esmer FERNANDEZ   PCA education completed  PCA education reinforced @ 1658  PCA button handed to pt @ 1700  Pt verbalizes and demonstrates understanding of PCA education verbally providers and states that she has used a PCA before.     Right radial Art line   Noted no blood return on draw back   Removed @ 1700. Manual pressure held [id number]  Transparent dressing in place  Site approximated puncture warm and pink   Pt educated on important signs and symptoms to report to nurse for bleeding or hematoma     NGT 55 cm @ left nare placed in OR  Placement unchanged.   Dark bloody drainage noted in tube  Clamped from 7299-8820  Ordered to low intermittent suction started @ 1600  100 ml output total     16 Fr Hernandez catheter stat lock to left thigh  Clear yellow urine with adequate output noted     19 Fr JAZZMINE drain x2 Right Lower Abdominal Quad  (bulb suction- blood drainage)  (dressings clean dry intact)     Midline Abdominal incision cannot be assessed   Surgical dressing covering site  Dressing is clean dry and intact   Shadowing noted on arrival to PACU   Dressing marked @ 1500.   Drainage not extended markings at this time    Pt turned to right side.   Mepilex dressing in place to sacrum.   Dressing clean dry and intact.  "  SPRING area due to coverage from dressing   No skin issues assessed on backside @ this time   Bruise noted to left lower quad of abdomen     Informed that pt has hx of smoking stopping as of this am. Cap refill greater than 3 sec fingertips and toes are cool and pale.     Lab called @ 1622 to check on status of results from labs sent don @ 1555 by Josef PHAM RN   Informed me that labs should be ready in about 10 mins - 1637    @ 1647 Dr. Main txt paged the following  \"Dena Bashir  1965 has lab results available for review prior to transferring to floor from PACU. Allen RN *[phone]\"    Aromatherapy utilized  Moth moisturizer applied    Pt remains NPO  Coughing and deep breathing encouraged   Pt on 2L 02 NC  SCDs in place   Education completed and reinforced   Family updated during stay     DISCHARGE CRITERIA MET @ 1700    Please see MAR, orders, notes, VS flow sheet, assessments, results and other sources of documentation in pt written and electronic chart for further details concerning care during this time.     Pt sent to floor with capno monitoring   2L 02 NC-RA 98-99% IPI 10    Report given via telephone to Kathy FERNANDEZ @ 9680  ALSO INFORMED RN:   Results reviewed   Pt on 2L but would tolerate RA  NGT clamped on transport   PCA in use education completed and button with pt  Pt has several chronic pain issues and takes opiates. Pt has a high tolerance. Pt describes pain to abdominal surgical site as stabbing and under rib cage worse with movement, Pt appears comfortably managed at this time. Pt appears to sleep with eyes closed and snoring intermittently. Pt pain initially was sever and has decreased to moderate. Pt aware that some pain is to be expected. Pt states pain has improved with interventions. Pt states that she feels she will be okay with PCA pump. Pt states that she feels ready to go upstairs to room and see .     MEDS GIVEN: from arrival to discharge ([id number])  4 Zofran   250 " fentanyl   2 dilaudid  225 LR  (see MAR for more details)    Aayush BALDERAS RN and NST out of PACU @ 1740 with pt via cart transporting to  room 17 bed 2    In pt chart after pt transferred out of PACU to complete documentation

## 2020-01-28 NOTE — ANESTHESIA PROCEDURE NOTES
Arterial Line Procedure Note  Staff:     Anesthesiologist:  Reynaldo Leon MD    Resident/CRNA:  Chava Correa MD    Arterial line performed by resident/CRNA in presence of a teaching physician    Location: In OR After Induction  Procedure Start/Stop Times:     patient identified, IV checked, site marked, risks and benefits discussed, informed consent, monitors and equipment checked, pre-op evaluation and at physician/surgeon's request      Correct Patient: Yes      Correct Position: Yes      Correct Site: Yes      Correct Procedure: Yes      Correct Laterality:  Yes    Site Marked:  Yes  Line Placement:     Procedure:  Arterial Line    Insertion Site:  Radial    Insertion laterality:  Right    Skin Prep: Chloraprep      Patient Prep: sterile gloves      Catheter size:  20 gauge, 12 cm    Cath secured with: suture      Dressing:  Tegaderm    Complications:  None obvious    Arterial waveform: Yes      IBP within 10% of NIBP: Yes

## 2020-01-28 NOTE — ANESTHESIA PROCEDURE NOTES
Peripheral Nerve Block Procedure Note    Staff:     Anesthesiologist:  Dayday Pendleton MD    Resident/CRNA:  Naresh Iglesias MD    Block performed by resident/CRNA in the presence of a teaching physician    Location: OR AFTER induction  Procedure Start/Stop TImes:      1/28/2020 8:15 AM     1/28/2020 8:25 AM    patient identified, IV checked, site marked, risks and benefits discussed, informed consent, monitors and equipment checked, pre-op evaluation, at physician/surgeon's request and post-op pain management      Correct Patient: Yes      Correct Position: Yes      Correct Site: Yes      Correct Procedure: Yes      Correct Laterality:  Yes    Site Marked:  Yes  Procedure details:     Procedure:  TAP (4 quadrant tap block )    ASA:  2    Laterality:  Bilateral    Position:  Supine    Sterile Prep: chloraprep, mask and sterile gloves      Local skin infiltration:  None    Needle:  Insulated    Needle gauge:  17    Needle length (inches):  3.1    Ultrasound: Yes      Ultrasound used to identify targeted nerve, plexus, or vascular structure and placed a needle adjacent to it      Permanent Image entered into patiient's record      Abnormal pain on injection: No      Blood Aspirated: No      Paresthesias:  No    Bleeding at site: No      Test dose negative for signs of intravascular injection: Yes      Bolus via:  Needle    Infusion Method:  Single Shot    Blood aspirated via catheter: No      Complications:  None  Assessment/Narrative:      Bilateral TAp Block

## 2020-01-28 NOTE — ANESTHESIA CARE TRANSFER NOTE
Patient: Nathalie Bashir    Procedure(s):  Open Whipple procedure and Cholecystectomy    Diagnosis: IPMN (intraductal papillary mucinous neoplasm) [D49.0]  Diagnosis Additional Information: No value filed.    Anesthesia Type:   General     Note:  Airway :Nasal Cannula  Patient transferred to:PACU  Comments: To PACU, awake, VSS, patent airway, report to RN.Handoff Report: Identifed the Patient, Identified the Reponsible Provider, Reviewed the pertinent medical history, Discussed the surgical course, Reviewed Intra-OP anesthesia mangement and issues during anesthesia, Set expectations for post-procedure period and Allowed opportunity for questions and acknowledgement of understanding      Vitals: (Last set prior to Anesthesia Care Transfer)    CRNA VITALS  1/28/2020 1419 - 1/28/2020 1502      1/28/2020             EKG:  Sinus rhythm                Electronically Signed By: NINA Huertas CRNA  January 28, 2020  3:02 PM

## 2020-01-28 NOTE — ANESTHESIA POSTPROCEDURE EVALUATION
Anesthesia POST Procedure Evaluation    Patient: Nathalie Bashir   MRN:     [id number] Gender:   female   Age:    54 year old :      1965        Preoperative Diagnosis: IPMN (intraductal papillary mucinous neoplasm) [D49.0]   Procedure(s):  Open Whipple procedure and Cholecystectomy   Postop Comments: No value filed.       Anesthesia Type:  Not documented  General    Reportable Event: NO     PAIN: Uncomplicated   Sign Out status: Comfortable, Well controlled pain     PONV: No PONV   Sign Out status:  No Nausea or Vomiting     Neuro/Psych: Uneventful perioperative course   Sign Out Status: Preoperative baseline; Age appropriate mentation     Airway/Resp.: Uneventful perioperative course   Sign Out Status: Non labored breathing, age appropriate RR; Resp. Status within EXPECTED Parameters     CV: Uneventful perioperative course   Sign Out status: Appropriate BP and perfusion indices; Appropriate HR/Rhythm     Disposition:   Sign Out in:  PACU  Disposition:  Floor  Recovery Course: Uneventful  Follow-Up: Not required           Last Anesthesia Record Vitals:  CRNA VITALS  2020 1419 - 2020 1509      2020             Pulse:  79    ART BP:  106/55    EKG:  Sinus rhythm          Last PACU Vitals:  Vitals Value Taken Time   /59 2020  3:02 PM   Temp 36.9  C (98.5  F) 2020  3:00 PM   Pulse 80 2020  3:00 PM   Resp 16 2020  3:02 PM   SpO2 100 % 2020  3:08 PM   Temp src     NIBP     Pulse 79 2020  3:00 PM   SpO2     Resp     Temp     Ht Rate     Temp 2     Vitals shown include unvalidated device data.      Electronically Signed By: Reynaldo Leon MD, 2020, 3:09 PM

## 2020-01-29 ENCOUNTER — APPOINTMENT (OUTPATIENT)
Dept: OCCUPATIONAL THERAPY | Facility: CLINIC | Age: 55
DRG: 327 | End: 2020-01-29
Attending: SURGERY
Payer: MEDICARE

## 2020-01-29 LAB
ALBUMIN SERPL-MCNC: 2.8 G/DL (ref 3.4–5)
ALP SERPL-CCNC: 92 U/L (ref 40–150)
ALT SERPL W P-5'-P-CCNC: 33 U/L (ref 0–50)
AMYLASE FLD-CCNC: 427 U/L
AMYLASE FLD-CCNC: 849 U/L
AMYLASE SERPL-CCNC: 111 U/L (ref 30–110)
ANION GAP SERPL CALCULATED.3IONS-SCNC: 6 MMOL/L (ref 3–14)
AST SERPL W P-5'-P-CCNC: 35 U/L (ref 0–45)
BASOPHILS # BLD AUTO: 0.1 10E9/L (ref 0–0.2)
BASOPHILS NFR BLD AUTO: 0.3 %
BILIRUB SERPL-MCNC: 0.4 MG/DL (ref 0.2–1.3)
BUN SERPL-MCNC: 10 MG/DL (ref 7–30)
CALCIUM SERPL-MCNC: 8.3 MG/DL (ref 8.5–10.1)
CHLORIDE SERPL-SCNC: 107 MMOL/L (ref 94–109)
CO2 SERPL-SCNC: 27 MMOL/L (ref 20–32)
CREAT SERPL-MCNC: 0.78 MG/DL (ref 0.52–1.04)
DIFFERENTIAL METHOD BLD: ABNORMAL
EOSINOPHIL # BLD AUTO: 0 10E9/L (ref 0–0.7)
EOSINOPHIL NFR BLD AUTO: 0.1 %
ERYTHROCYTE [DISTWIDTH] IN BLOOD BY AUTOMATED COUNT: 14.2 % (ref 10–15)
GFR SERPL CREATININE-BSD FRML MDRD: 87 ML/MIN/{1.73_M2}
GLUCOSE BLDC GLUCOMTR-MCNC: 123 MG/DL (ref 70–99)
GLUCOSE BLDC GLUCOMTR-MCNC: 129 MG/DL (ref 70–99)
GLUCOSE BLDC GLUCOMTR-MCNC: 137 MG/DL (ref 70–99)
GLUCOSE BLDC GLUCOMTR-MCNC: 151 MG/DL (ref 70–99)
GLUCOSE BLDC GLUCOMTR-MCNC: 151 MG/DL (ref 70–99)
GLUCOSE SERPL-MCNC: 141 MG/DL (ref 70–99)
HCT VFR BLD AUTO: 37.7 % (ref 35–47)
HGB BLD-MCNC: 11.9 G/DL (ref 11.7–15.7)
IMM GRANULOCYTES # BLD: 0.1 10E9/L (ref 0–0.4)
IMM GRANULOCYTES NFR BLD: 0.3 %
INR PPP: 1.28 (ref 0.86–1.14)
LACTATE BLD-SCNC: 0.6 MMOL/L (ref 0.7–2)
LYMPHOCYTES # BLD AUTO: 1.3 10E9/L (ref 0.8–5.3)
LYMPHOCYTES NFR BLD AUTO: 7.4 %
MAGNESIUM SERPL-MCNC: 1.9 MG/DL (ref 1.6–2.3)
MCH RBC QN AUTO: 28.2 PG (ref 26.5–33)
MCHC RBC AUTO-ENTMCNC: 31.6 G/DL (ref 31.5–36.5)
MCV RBC AUTO: 89 FL (ref 78–100)
MONOCYTES # BLD AUTO: 1.3 10E9/L (ref 0–1.3)
MONOCYTES NFR BLD AUTO: 7.8 %
NEUTROPHILS # BLD AUTO: 14.5 10E9/L (ref 1.6–8.3)
NEUTROPHILS NFR BLD AUTO: 84.1 %
NRBC # BLD AUTO: 0 10*3/UL
NRBC BLD AUTO-RTO: 0 /100
PHOSPHATE SERPL-MCNC: 3.2 MG/DL (ref 2.5–4.5)
PLATELET # BLD AUTO: 338 10E9/L (ref 150–450)
POTASSIUM SERPL-SCNC: 4 MMOL/L (ref 3.4–5.3)
PROT SERPL-MCNC: 5.7 G/DL (ref 6.8–8.8)
RBC # BLD AUTO: 4.22 10E12/L (ref 3.8–5.2)
SODIUM SERPL-SCNC: 140 MMOL/L (ref 133–144)
SPECIMEN SOURCE FLD: NORMAL
SPECIMEN SOURCE FLD: NORMAL
WBC # BLD AUTO: 17.3 10E9/L (ref 4–11)

## 2020-01-29 PROCEDURE — 25000132 ZZH RX MED GY IP 250 OP 250 PS 637: Mod: GY | Performed by: SURGERY

## 2020-01-29 PROCEDURE — 25000128 H RX IP 250 OP 636: Performed by: SURGERY

## 2020-01-29 PROCEDURE — 82150 ASSAY OF AMYLASE: CPT | Performed by: PHYSICIAN ASSISTANT

## 2020-01-29 PROCEDURE — 36415 COLL VENOUS BLD VENIPUNCTURE: CPT | Performed by: SURGERY

## 2020-01-29 PROCEDURE — 97165 OT EVAL LOW COMPLEX 30 MIN: CPT | Mod: GO

## 2020-01-29 PROCEDURE — 83735 ASSAY OF MAGNESIUM: CPT | Performed by: SURGERY

## 2020-01-29 PROCEDURE — 82150 ASSAY OF AMYLASE: CPT | Performed by: SURGERY

## 2020-01-29 PROCEDURE — 25800025 ZZH RX 258: Performed by: SURGERY

## 2020-01-29 PROCEDURE — 83605 ASSAY OF LACTIC ACID: CPT | Performed by: SURGERY

## 2020-01-29 PROCEDURE — 12000001 ZZH R&B MED SURG/OB UMMC

## 2020-01-29 PROCEDURE — 25000128 H RX IP 250 OP 636: Performed by: PHYSICIAN ASSISTANT

## 2020-01-29 PROCEDURE — 00000146 ZZHCL STATISTIC GLUCOSE BY METER IP

## 2020-01-29 PROCEDURE — 25800030 ZZH RX IP 258 OP 636: Performed by: SURGERY

## 2020-01-29 PROCEDURE — 97530 THERAPEUTIC ACTIVITIES: CPT | Mod: GO

## 2020-01-29 PROCEDURE — 25000128 H RX IP 250 OP 636: Performed by: STUDENT IN AN ORGANIZED HEALTH CARE EDUCATION/TRAINING PROGRAM

## 2020-01-29 PROCEDURE — 97535 SELF CARE MNGMENT TRAINING: CPT | Mod: GO

## 2020-01-29 PROCEDURE — 80053 COMPREHEN METABOLIC PANEL: CPT | Performed by: SURGERY

## 2020-01-29 PROCEDURE — 85025 COMPLETE CBC W/AUTO DIFF WBC: CPT | Performed by: SURGERY

## 2020-01-29 PROCEDURE — 85610 PROTHROMBIN TIME: CPT | Performed by: SURGERY

## 2020-01-29 PROCEDURE — 84100 ASSAY OF PHOSPHORUS: CPT | Performed by: SURGERY

## 2020-01-29 PROCEDURE — C9113 INJ PANTOPRAZOLE SODIUM, VIA: HCPCS | Performed by: SURGERY

## 2020-01-29 RX ORDER — METHOCARBAMOL 500 MG/1
500 TABLET, FILM COATED ORAL 4 TIMES DAILY
Status: DISCONTINUED | OUTPATIENT
Start: 2020-01-29 | End: 2020-02-04 | Stop reason: HOSPADM

## 2020-01-29 RX ADMIN — INSULIN ASPART 1 UNITS: 100 INJECTION, SOLUTION INTRAVENOUS; SUBCUTANEOUS at 05:14

## 2020-01-29 RX ADMIN — METHOCARBAMOL 500 MG: 500 TABLET, FILM COATED ORAL at 12:36

## 2020-01-29 RX ADMIN — Medication: at 16:32

## 2020-01-29 RX ADMIN — ENOXAPARIN SODIUM 40 MG: 40 INJECTION SUBCUTANEOUS at 10:30

## 2020-01-29 RX ADMIN — PANTOPRAZOLE SODIUM 40 MG: 40 INJECTION, POWDER, FOR SOLUTION INTRAVENOUS at 07:47

## 2020-01-29 RX ADMIN — Medication: at 06:19

## 2020-01-29 RX ADMIN — DEXTROSE MONOHYDRATE AND SODIUM CHLORIDE: 5; .45 INJECTION, SOLUTION INTRAVENOUS at 16:32

## 2020-01-29 RX ADMIN — DEXTROSE MONOHYDRATE AND SODIUM CHLORIDE: 5; .45 INJECTION, SOLUTION INTRAVENOUS at 08:44

## 2020-01-29 RX ADMIN — METHOCARBAMOL 500 MG: 500 TABLET, FILM COATED ORAL at 20:29

## 2020-01-29 RX ADMIN — SODIUM CHLORIDE, POTASSIUM CHLORIDE, SODIUM LACTATE AND CALCIUM CHLORIDE: 600; 310; 30; 20 INJECTION, SOLUTION INTRAVENOUS at 08:01

## 2020-01-29 RX ADMIN — METHOCARBAMOL 500 MG: 500 TABLET, FILM COATED ORAL at 08:24

## 2020-01-29 RX ADMIN — METHOCARBAMOL 500 MG: 500 TABLET, FILM COATED ORAL at 15:55

## 2020-01-29 RX ADMIN — CITALOPRAM HYDROBROMIDE 20 MG: 20 TABLET ORAL at 08:24

## 2020-01-29 RX ADMIN — PANTOPRAZOLE SODIUM 40 MG: 40 INJECTION, POWDER, FOR SOLUTION INTRAVENOUS at 20:29

## 2020-01-29 RX ADMIN — INSULIN ASPART 1 UNITS: 100 INJECTION, SOLUTION INTRAVENOUS; SUBCUTANEOUS at 12:35

## 2020-01-29 RX ADMIN — AMITRIPTYLINE HYDROCHLORIDE 75 MG: 75 TABLET, FILM COATED ORAL at 21:14

## 2020-01-29 ASSESSMENT — ACTIVITIES OF DAILY LIVING (ADL)
ADLS_ACUITY_SCORE: 16
ADLS_ACUITY_SCORE: 18
ADLS_ACUITY_SCORE: 18
ADLS_ACUITY_SCORE: 15
ADLS_ACUITY_SCORE: 15
ADLS_ACUITY_SCORE: 18

## 2020-01-29 ASSESSMENT — PAIN DESCRIPTION - DESCRIPTORS
DESCRIPTORS: ACHING

## 2020-01-29 NOTE — PLAN OF CARE
"BP (!) 154/72   Pulse 103   Temp 97.5  F (36.4  C)   Resp 17   Ht 1.727 m (5' 7.99\")   Wt 90.3 kg (199 lb 1.2 oz)   LMP 09/23/2014   SpO2 96%   BMI 30.28 kg/m       VSS. Alert and oriented x4. Pt states pain is constant but PCA pump is helping manage post-procedural pain. Dose increased per range in MAR after non-pharma approaches were unsuccessful. Pt NPO ex meds. Pt refused evening med per MAR.  Pt denied SOB and nausea. Pt able to turn in bed, but refused transferring to chair due to pain. Hernandez cares completed. Adequate output per flowsheets. JAZZMINE drains on R side patent and draining dark red output. Mid abdom incision dressing marked per drainage. No additional drainage noted after drsg marked. NG tube intact and on low intermit suction to wall. Output is brown/dark red.  LR running at 125ml/hr. BG monitoring q4hr post whipple procedure continues. 1 unit insulin admin'd per MAR for BG of 181. Incentive spirometer education completed and use encouraged. SCDs currently on bilateral lower extremities. Will continue to monitor. Continue POC.   "

## 2020-01-29 NOTE — PROGRESS NOTES
POST OP CHECK     Pain well controlled, PCA dose was increased, denies CP/SOB or N/V      Vitals:    01/28/20 1820 01/28/20 1850 01/28/20 1950 01/28/20 2030   BP: 124/74 133/79 136/74 (!) 154/72   Cuff Size:       Pulse: 101 100 100 103   Resp: 19 17 19 17   Temp:    97.5  F (36.4  C)   TempSrc:       SpO2: 96% 96% 97% 96%   Weight:       Height:           I/O last 3 completed shifts:  In: 1700 [I.V.:1700]  Out: 800 [Urine:800]      Alert and oriented  Non labored breathing on NC  Non cyanotic   Soft abdomen, incision site covered with dressing partially saturated with blood, appropriately tender with 2 drains in place with serosanguinous ouput  Warm BLE, no edema     A/P:     - Continue plan of cares     Ban RIVERA  General Surgery (PGY-1)  Pager [phone]

## 2020-01-29 NOTE — PLAN OF CARE
Cared for pt from [id number]. HR 110s, AOVSS. Pt on 1L NC in mid-90s. Incisional pain managed with PCA dialudid and scheduled robaxin. Back pain managed with repositioning and heat packs. Pt's  to bring in flector patches for pt's hip, but must not use them unless hip pain is really bad and after consulting with surgonc team again (per Dr. Mallory) d/t being NSAID. Incision saturated dressing with serosanguinous drainage, mostly dried drainage. Dressing changed by RN (see provider notification note). JAZZMINE x2 thumbprint suction, samples sent for amylase, bright red drainage, site CDI. Hernandez with adequate UOP. NG to LIS, brown liquid output. No flatus, no BM. Pt ambulated Aof1 in halls with therapy. PIV infusing MIVF. NPO except ice. LA 0.6. Continue POC.    Addendum: pt uncomfortable in bed and chair, recliner chair better for pt's back, c/o mostly back pain tonight.

## 2020-01-29 NOTE — PLAN OF CARE
PT-7C- PT Consult Order received, per chart review and communication with interdisciplinary care team, pt is mobilizing well, may only demonstrate skilled need for 1 therapy service. PT will hold evaluation at this time and initiate as indicated. Pt will continue to participate in Occupational Therapy while inpatient.

## 2020-01-29 NOTE — PLAN OF CARE
7C OT Evaluation    Discharge Planner OT   Patient plan for discharge: home with A  Current status: OT eval completed, tx indicated. Edu on use of logroll and abd precautions, pt mod I with use of bed rail for supine <> sit with logroll, pt amb ~75ft with CGA and SPC in R hand and pushing IV pole in L hand, pt reporting lack of comfort in amb without support with B hands, pt with difficulty performing figure four 2/2 to L hip surgery, edu on use of AE for bathing/dressing  Barriers to return to prior living situation: post-surgical precautions, pain, fatigue  Recommendations for discharge: Home with A   Rationale for recommendations: Anticipate that by time of discharge pt will be safe to discharge home with A from SO and son for ADL/IADLs.        Entered by: Ruthy Ace 01/29/2020 11:41 AM

## 2020-01-29 NOTE — OP NOTE
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 Exploratory Laparotomy  2. Standard Whipple Procedure w/ Billroth II Reconstruction  3. Falciform Pedicle Flap     Anesthesia Type: General Endotracheal  Attending Physician: Yandel Mallory MD  First Assistant Physician: Josef Brice MD (No qualified resident was available.)  Resident: Doe Main MD (Assisted at the end of the case when was available.)     Indications: 54 F w/ main duct IPMN as defined on CT, MRI, and EUS with main duct size of 8-10.4 mm in diameter depending on the imaging modality.  FNA analysis of cyst fluid showed mucinous epithelium and a string sign.  Given this diagnosis, she was recommended to undergo surgical resection for prevention of malignancy.     Findings: Exploratory laparotomy did not reveal any evidence of liver or peritoneal metastases from a malignant process.  The liver was fatty.  The distal aspect/antrum of the stomach was thickened and appeared to show some serosal fibrosis suggestive of possible prior inflammation or healing in the area.  The pancreas was normal appearing and had a very soft texture with a duct size of 3-4 mm.  There was no obvious tumor palpable in the head of the pancreas.  I did not notice any enlarged lymph nodes.  Anastomoses were conducted tension free.  Hemostasis was adequate.      Description of Procedure: After appropriate informed consent was obtained, the patient was brought to the operating room where a timeout was performed to identify the correct patient, procedure, and site.  Cefoxitin was administered for perioperative antibiotic prophylaxis.  SCDs were placed for DVT prophylaxis.  The patient was also given 40 units subcutaneous Lovenox in the pre-operative holding area for DVT prophylaxis.  The patient was  placed in suppine position with arms out to the side and was then induced under general endotracheal anesthesia.  A Hernandez catheter was placed.  An NG tube was placed.  The patient was prepped and draped in a sterile fashion.  We began by entering the abdominal cavity via a midline laparotomy from the xyphoid to just below the level of the umbilicus.  We did not see or palpate evidence of peritoneal or hepatic metastatic disease from a malignant process.  The liver was fatty in appearance.  The distal aspect/antrum of the stomach was thickened and appeared to have some evidence of serosal fibrosis/scarring, possibly from prior inflammation or healing.  A Ramos retractor was placed.  We partially mobilized the hepatic flexure of the colon and performed a wide Kocher maneuver of the duodenum to the left of the aortocaval groove.  The ligament of Treitz was released from above the transverse mesocolon.  No tumor was obviously palpable in the head of the pancreas.  We then entered the lesser sac via the gastrocolic omentum.  The gastrocolic omentum was divided to the right and the transverse mesocolon  from the head of the pancreas.  The right gastroepiploic vessels were followed to their origin near the pylorus and divided with LigaSure. Once we gained this exposure, we followed the middle colic vessels to the inferior border of the pancreas.  Using blunt and sharp dissection, we exposed the superior mesenteric vein as it emerged from the inferior border of the neck of the pancreas.  A tunnel was easily created beneath the neck of the pancreas from below using blunt dissection under direct visualization.  Next, we divided the pars flaccida.  The hepatic artery lymph node was dissected and removed with LigaSure and sent for permanent section.  This exposed the hepatic artery, which we dissected to the takeoff of the gastroduodenal artery.  This was dissected and encircled with a vessel loop.  Flow was  occluded in the gastroduodenal artery and continued pulsation and thrill was noted within the hepatic artery.  The portal vein was then identified superior to the neck of the pancreas and the tunnel beneath the neck was completed and encircled with a vessel loop.  To facilitate dissection of the lateral portion of the portal vein, the right gastric artery was divided with LigaSure.  Next, we performed a top down cholecystectomy and ligated the cystic duct and artery with 3-0 silk.  The gallbladder was sent as a permanent section.  At this point, we encircled the common bile duct below the cystic duct takeoff and ensured separation from the portal vein.  Given no evidence of unresectability, we proceeded to divide the distal stomach just proximal to incisura and the thickened area of stomach with fibrosis in a much more normal feeling area of the stomach with a VALENTIN 60 mm black load, the gastroduodenal artery with a 35 mm vascular staple load (also stick tied with 5-0 prolene), and the bile duct and neck of the pancreas using the Bovie.  A bulldog clamp was placed on the bile duct to prevent ongoing spillage.  We then identified the ligament of Treitz from below the transverse mesocolon and divided the proximal jejunum at a point 30 cm distal with good length using a 60 mm VALENTIN blue load.  The mesentery was divided immediately along the bowel wall with LigaSure.  The remainder of the ligament of Treitz was released from this side using a combination of Bovie and LigaSure.  The jejunum was then passed through this tunnel to the right side of the patient.  We then dissected the uncinate of the pancreas from the portal vein with blunt dissection, ligating small portal side branches along the way with 3-0 silk and LigaSure.  The uncinate mesentery was divided along the superior mesenteric artery using a LigaSure, and the specimen was removed and sent to pathology.  The bile duct margin and pancreatic margin of the Whipple  specimen was noted to be negative for malignancy, dysplasia, or IPMN on frozen section.  The remainer of the specimen was kept for permanent section.  We next performed the reconstruction.  The pancreas was very soft in texture and had a 3-4 mm duct.  The distal end of the remaining small bowel was passed through a hole in the transverse mesocolon.  An end to side pancreatojejunostomy was performed using full thickness 3-0 vicryl mattress sutures and a duct to mucosa anastomosis with 5-0 interrupted PDS.  We next performed an end to side hepaticojejunostomy with 5-0 interrupted PDS.  Finally, we performed a Billroth II gastrojejunostomy 40 cm downstream using a VALENTIN 60 mm blue load and TA 60 mm blue load to close the common opening.  A crotch stitch and Brolin stitch were placed.  We then harvested a falciform pedicle flap and wrapped this around the pancreatojejunostomy and tacked it in place with 3-0 silk.  The transverse colon mesentery was tacked to the passing small bowel with 3-0 silk to prevent internal herniation.  Hemostasis was achieved and the area irrigated.  A #19 round Jacoby drain was placed posterior to the hepaticojejunostomy and cephalad to the pancreatojejunostomy.  This drain came out the skin more superior and posterior.  A second #19 round Jacoby was place caudad to the pancreatojejunostomy and brought out more anterior and inferior.  Both were secured to the skin with 2-0 nylon.  The remainder of the omentum was used to cover the anastomoses.  The incisional fascia was closed with #1 looped PDS.  The open incision was irrigated.  The skin was then closed using staples.  All instrument, sponge, and needle counts were correct at the end of the case x 2.  The patient was then extubated and taken to the PACU in stable condition.  She tolerated the procedure well and I discussed the case with the family in the waiting room.     Estimated Blood Loss: 200 mL  Urine Output: See anesthesia  record  Fluids: See anesthesia record  Drains: #19 Round Jacoby in the Right Upper Quadrant x 2  Specimens: Hepatic Artery Lymph Node, Gallbladder, Whipple Specimen w/ Pancreatic and Bile Duct Margin Frozen Sections     Disposition: PACU --> 7C

## 2020-01-29 NOTE — CONSULTS
Inpatient Pain Management Service: Consultation      DATE OF CONSULT: January 29, 2020      REASON FOR PAIN CONSULTATION:  Nathalie Bashir is a 54 year old female I am seeing in consultation at the request of Dr. Doe Main for evaluation and recommendations for her acute post op pain.       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  Lidocaine patch and menthol patches are not effective.    Chronic pain syndrome    Chronic opioid dependence    RA    PTA pain medications:  Oxycodone 10mg po 4 tabs/24hrs, Tizanidine 4mg po BID, diclofenac patch to hips for bursitis PRN    s/p L) hip arthroplasty 2016 x 1 revision - with Dr. Ken    s/p L4-5 decompression - Dr. Phillip    s/p L) SI joint fusion by Dr. Phillip    Chronic LBP    Leg length discrepancy due to injury with multiple surgeries    C4-7 stenosis     B/L bursitis    Allergic Rhinitis    Migraines    Depression    --[place]  was reviewed    Primary Care Provider: Marianne Gonzalez  Chronic Pain Provider: [facility], [place], [place]    -- Outpatient opioid requirements prior to admission: Oxycodone 10mg QID          TREATMENT RECOMMENDATIONS/PLAN:     Continue PCA HYDROmorphone as ordered 0.4mg with q 10min lockout - Pt used 18.1mg HYDROmorphone since PCA was started last evening.   Will convert to oral HYDROmorphone when tolerating PO - this was discussed in PAC appointment and pt is still agreeable to this plan    Continue methocarbamol 500mg po QID PRN muscle spasms    Continue hydroxyzine 10mg po TID PRN - Pt takes this medication at home     will bring in Flector Patches today as well as her body pillow and  blanket    Recommend close monitoring of respiratory status postoperatively with capnography and continuous SpO2 monitoring.  Would recommend continuing capnography beyond the usual 24 hr due to patient's opioid tolerance.    Continue bowel regimen to prevent opioid induced constipation    Pain Service will follow peripherally.       Recommendations were discussed with   Plan was reviewed by the Pain Service Team and Dr. Yandel Mireles, Anesthesiologist.    Thank you for consulting the Inpatient Pain Management Service.   The above recommendations are to be acted upon at the primary team s discretion.    To reach us:  Mon - Friday 8 AM - 3 PM: Pager [phone] (Text Page)  After hours, weekends and holidays: Primary service should call [phone] for the on-call pain specialist        HISTORY OF PRESENT ILLNESS:    Pt endorses chronic pain at base line in low back, neck, b/l hips, R) foot.  She has had surgery on her cervical spine and lumbar spine. She states she has been dx with rheumatoid arthritis.  She states she will need surgery on her right foot in the future.  She follows with [facility].  She takes oxycodone 10mg QID and follows with a pain clinic in [place]. She goes to the pain clinic monthly for opioid scripts.  She states her average daily pain is a 3 on a 0/10 VAS.  The worst pain this morning is the abdominal incision and her back.  She rates the pain at 7-9 and states its tolerable.  The worst pain in mid right abdomen. The pain is worse with position changes and getting in and out of bed.  She dangled at the bedside overnight. Lying still makes the pain better.  She describes the pain in the abdomen as shooting and it just hurts.  She endorses muscle spasms and took methocarbamol this am.  Pt takes tizanidine 4mg po BID at home but is willing to stick with methocarbamol during this hospitalization.  She does not tolerate acetaminophen, gabapentin, lyrica, lidocaine patch,  "menthol patch.  She uses flector patches for her hips and her  will bring the patches to her today.  She is NPO but tolerating sips/chips. She denies nausea or passing flatus. She denies having constipation on opioids.  She regulates her bowels with diet.  States she is burping. She slept off and on overnight.  She reports there are times when she takes amitriptyline before bed and she still is awake all night.  She reports her anxiety and depression are stable at this time. We discussed the multimodal pain regimen that is in place including, class, dose, frequency.  Explained when she is ready to transition from PCA we will change he to oral HYDROmorphone which is what was discussed in the PAC. There were no further questions. Pt is agreeable to the plan discussed.    REVIEW OF 10 BODY SYSTEMS: 10 point ROS of systems including Constitutional, Eyes, Respiratory, Cardiovascular, Gastroenterology, Genitourinary, Integumentary, Musculoskeletal, Psychiatric were all negative except for pertinent positives noted in my HPI.       INPATIENT MEDICATIONS PERTINENT TO PAIN CONSULT:   Medications related to Pain Management (From now, onward)    Start     Dose/Rate Route Frequency Ordered Stop    01/28/20 2315  HYDROmorphone (DILAUDID) PCA 0.2 mg/mL OPIOID TOLERANT       Intravenous CONTINUOUS 01/28/20 2309      01/28/20 2200  amitriptyline (ELAVIL) tablet 75 mg      75 mg Oral AT BEDTIME 01/28/20 1814 01/28/20 1814  hydrOXYzine (ATARAX) tablet 10 mg      10 mg Oral 3 TIMES DAILY PRN 01/28/20 1814 01/28/20 1814  lidocaine 1 % 0.1-1 mL      0.1-1 mL Other EVERY 1 HOUR PRN 01/28/20 1814 01/28/20 1814  lidocaine (LMX4) cream       Topical EVERY 1 HOUR PRN 01/28/20 1814      01/28/20 1452  bupivacaine liposome (EXPAREL) LONG ACTING injection was administered into the infiltration site to produce postsurgical analgesia. Duration of action is up to 72 hours, and other \"janet\" medications should not be given for " "96 hours with the exception of the lidocaine 5% patch (LIDODERM) and the lidocaine 10mg in potassium infusions. This entry is for INFORMATION ONLY.       Does not apply CONTINUOUS PRN 01/28/20 1452 02/01/20 1451            CURRENT MEDICATIONS:   Current Facility-Administered Medications Ordered in Epic   Medication Dose Route Frequency Provider Last Rate Last Dose     amitriptyline (ELAVIL) tablet 75 mg  75 mg Oral At Bedtime Doe Main MD         bupivacaine liposome (EXPAREL) LONG ACTING injection was administered into the infiltration site to produce postsurgical analgesia. Duration of action is up to 72 hours, and other \"janet\" medications should not be given for 96 hours with the exception of the lidocaine 5% patch (LIDODERM) and the lidocaine 10mg in potassium infusions. This entry is for INFORMATION ONLY.   Does not apply Continuous PRN Jason Roberts MD         citalopram (celeXA) tablet 20 mg  20 mg Oral QAM Doe Main MD         glucose gel 15-30 g  15-30 g Oral Q15 Min PRN Doe Main MD        Or     dextrose 50 % injection 25-50 mL  25-50 mL Intravenous Q15 Min PRN Doe Main MD        Or     glucagon injection 1 mg  1 mg Subcutaneous Q15 Min PRN Doe Main MD         fluticasone (FLONASE) 50 MCG/ACT spray 2 spray  2 spray Both Nostrils Daily PRN Doe Main MD         HYDROmorphone (DILAUDID) PCA 0.2 mg/mL OPIOID TOLERANT   Intravenous Continuous EngMelissa hercules MD         hydrOXYzine (ATARAX) tablet 10 mg  10 mg Oral TID PRN Doe Main MD         insulin aspart (NovoLOG) inj (RAPID ACTING)  1-6 Units Subcutaneous Q4H Doe Main MD   1 Units at 01/29/20 0514     lactated ringers infusion   Intravenous Continuous Doe Main  mL/hr at 01/28/20 7096       lidocaine (LMX4) cream   Topical Q1H PRN Doe Main MD         lidocaine 1 % 0.1-1 mL  0.1-1 mL Other Q1H PRN Doe Main MD         naloxone (NARCAN) injection 0.1-0.4 mg  0.1-0.4 mg " Intravenous Q2 Min PRN Doe Main MD         ondansetron (ZOFRAN-ODT) ODT tab 4 mg  4 mg Oral Q6H PRN Doe Main MD        Or     ondansetron (ZOFRAN) injection 4 mg  4 mg Intravenous Q6H PRN Doe Main MD         pantoprazole (PROTONIX) 40 mg IV push injection  40 mg Intravenous BID Doe Main MD         prochlorperazine (COMPAZINE) injection 10 mg  10 mg Intravenous Q6H PRN Doe Main MD        Or     prochlorperazine (COMPAZINE) tablet 10 mg  10 mg Oral Q6H PRN Doe Main MD         sodium chloride (PF) 0.9% PF flush 3 mL  3 mL Intracatheter q1 min prn Doe Main MD         sodium chloride (PF) 0.9% PF flush 3 mL  3 mL Intracatheter Q8H Doe Main MD   3 mL at 01/28/20 2002     No current [facility]-ordered outpatient medications on file.           HOME/PREVIOUS MEDICATIONS:   Prior to Admission medications    Medication Sig Start Date End Date Taking? Authorizing Provider   amitriptyline (ELAVIL) 25 MG tablet Take 3 tablets by mouth At Bedtime 5/22/18  Yes Reported, Patient   calcium carb 1250 mg, 500 mg [place],/vitamin D 200 units (OSCAL WITH D) 500-200 MG-UNIT per tablet Take 1 tablet by mouth 3 times daily (with meals)   Yes Reported, Patient   citalopram (CELEXA) 20 MG tablet Take 20 mg by mouth every morning  2/11/19  Yes Reported, Patient   fluticasone (FLONASE) 50 MCG/ACT nasal spray Spray 2 sprays into both nostrils daily as needed    Yes Reported, Patient   hydrOXYzine (ATARAX) 10 MG tablet Take 10 mg by mouth 3 times daily as needed for itching   Yes Reported, Patient   multivitamin, therapeutic with minerals (MULTI-VITAMIN) TABS Take 1 tablet by mouth daily   Yes Reported, Patient   oxyCODONE IR (ROXICODONE) 10 MG tablet Take 10 mg by mouth 4 times daily 12/3/19  Yes Reported, Patient   SUMAtriptan (IMITREX) 100 MG tablet Take 100 mg by mouth as needed 5/23/16  Yes Reported, Patient   tiZANidine (ZANAFLEX) 4 MG tablet Take 1 tablet by mouth 2 times daily as needed  10/7/19  Yes Reported, Patient   diclofenac (FLECTOR) 1.3 % patch Place 1 patch onto the skin daily as needed     Reported, Patient       ALLERGIES:    Allergies   Allergen Reactions     Gluten Meal Palpitations     Pregabalin      interfered with Cymbalta     Acetaminophen Nausea and Other (See Comments)     Urine retention       Amoxicillin Other (See Comments)     ineffective     [place] Extract      Gabapentin GI Disturbance     dizziness     Methadone Fatigue     Mold      Naprosyn [Naproxen] GI Disturbance     dizziness     Oxycontin [Oxycodone]      Confusion, loopy, oxycodone is tolerated     Oxymetholone      Seasonal Allergies      Topiramate Other (See Comments)     Tongue numbness, taste alteration, irritable      Latex Rash            PAST MEDICAL AND PSYCHIATRIC HISTORY:    Past Medical History:   Diagnosis Date     Allergic rhinitis      Depression      Lumbago      Migraine      Other chronic pain     low back     Sacroiliitis (H)     low back pain     Trochanteric bursitis     leg length discrrepancy, hip pain           PAST SURGICAL HISTORY:   Past Surgical History:   Procedure Laterality Date     ARTHROPLASTY HIP Left 2016     BACK SURGERY      L4-5 decompression     C REPAIR DETACH RETINA,SCLERAL BUCKLE       CATARACT IOL, RT/LT       EYE SURGERY       OPEN REDUCTION INTERNAL FIXATION RODDING INTRAMEDULLARY FEMUR Left 12/23/2014    Procedure: OPEN REDUCTION INTERNAL FIXATION RODDING INTRAMEDULLARY FEMUR;  Surgeon: Arnol Santiago MD;  Location: [facility] OR     ORTHOPEDIC SURGERY       REMOVE HARDWARE LOWER EXTREMITY Left 4/7/2015    Procedure: REMOVE HARDWARE LOWER EXTREMITY;  Surgeon: Arnol Santiago MD;  Location: [facility] OR     REMOVE HARDWARE RODDING INTRAMEDULLARY FEMUR Left 12/17/2015    Procedure: REMOVE HARDWARE RODDING INTRAMEDULLARY FEMUR;  Surgeon: Arnol Santiago MD;  Location: [facility] OR     RESECT BONE LOWER EXTREMITY Left 12/23/2014    Procedure: RESECT BONE LOWER EXTREMITY;  Surgeon:  Arnol Santiago MD;  Location: [facility] OR         FAMILY HISTORY: family history includes Heart Failure (age of onset: 77) in her mother.      HEALTH & LIFESTYLE PRACTICES:   Tobacco:  reports that she has been smoking cigarettes. She has a 12.50 pack-year smoking history. She has never used smokeless tobacco.  Alcohol:  reports no history of alcohol use.  Illicit drugs:  reports no history of drug use.      SOCIAL HISTORY: Pt lives with , son and 3 grandsons.      LABORATORY VALUES:   Last Basic Metabolic Panel:  Lab Results   Component Value Date     01/28/2020      Lab Results   Component Value Date    POTASSIUM 3.9 01/28/2020     Lab Results   Component Value Date    CHLORIDE 110 01/28/2020     Lab Results   Component Value Date    PETRA 7.9 01/28/2020     Lab Results   Component Value Date    CO2 30 01/28/2020     Lab Results   Component Value Date    BUN 9 01/28/2020     Lab Results   Component Value Date    CR 0.78 01/28/2020     Lab Results   Component Value Date     01/28/2020       CBC results:  Lab Results   Component Value Date    WBC 14.9 01/28/2020     Lab Results   Component Value Date    HGB 12.3 01/28/2020     Lab Results   Component Value Date    HCT 37.3 01/28/2020     Lab Results   Component Value Date     01/28/2020       DIAGNOSTIC TESTS:   none    Labs above reviewed as well as additional relevant diagnostic studies from the EPIC record.       PHYSICAL EXAMINATION:  VITAL SIGNS:  B/P: 127/59, T: 97.1, P: 109, R: 18    CONSTITUTIONAL/GENERAL APPEARANCE:overweigh middle aged female lying in hospital bed NAD  EYES: JOAN, pupils 2mm equal, sclera clear  ENT: MMM, no nasal drainage, NG in place  RESPIRATORY: capnography in place, CTA, respirations even, unlabored, no use of accessory muscles  CARDIOVASCULAR: RRR, no murmur  GI:vertical, midline incision dressing with shadowing, appropriately tender, nondistended, 2 drains in R)LQ pink drainage dressing c/d/i  : Mary  catheter in place with   MUSCULOSKELETAL/EXTREMITIES: moves all extremities, strength in upper and lower extremities 5/5, good bed mobility   GAIT: not observe  NEURO: alert, oriented, answers questions appropriately, sensation intact upper and lower extremities  SKIN/VASCULAR EXAM: warm, dry, no rashes on abdomen   PSYCHIATRIC/BEHAVIORAL/OBSERVATIONS:  No objective signs of pain observed during our interview.   Judgment/Insight - good   Orientation - intact   Memory - good historian   Mood and affect - appropriate       NINA Lott CNP  January 29, 2020, 6:44 AM  Inpatient Pain Management Service

## 2020-01-29 NOTE — PROGRESS NOTES
01/29/20 1000   Quick Adds   Type of Visit Initial Occupational Therapy Evaluation   Living Environment   Lives With significant other;child(linette), adult;grandchild(linette)   Living Arrangements house   Home Accessibility stairs to enter home;stairs within home   Number of Stairs, Main Entrance 2   Stair Railings, Main Entrance railings safe and in good condition   Number of Stairs, Within Home, Primary 10   Transportation Anticipated car, drives self;family or friend will provide   Living Environment Comment Pt lives in a house with her SO, son, and three grandchildren. 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 Pt drives at baseline and has a tub shower. Pt reports that she has been sleeping on the couch on the main floor and will not have to go up/down stairs.   Self-Care   Usual Activity Tolerance good   Current Activity Tolerance moderate   Equipment Currently Used at Home cane, straight;raised toilet;shower chair;dressing device   Activity/Exercise/Self-Care Comment IND at baseline for ADLs and uses a SPC after L hip surgery. Also has a shower chair, reacher, and sock aide form hip surgery although does not currently use.    Functional Level   Ambulation 1-->assistive equipment   Transferring 1-->assistive equipment   Toileting 1-->assistive equipment  (raised toilet )   Bathing 0-->independent   Dressing 0-->independent   Eating 0-->independent   Communication 0-->understands/communicates without difficulty   Swallowing 0-->swallows foods/liquids without difficulty   Cognition 0 - no cognition issues reported   Fall history within last six months no   Number of times patient has fallen within last six months 0   Which of the above functional risks had a recent onset or change? ambulation;transferring;toileting;bathing;dressing   General Information   Onset of Illness/Injury or Date of Surgery - Date 01/28/20   Referring Physician Doe Main MD   Patient/Family Goals Statement To endy  home   Additional Occupational Profile Info/Pertinent History of Current Problem Nathalie Bashir is a 54 year old female POD# 1 s/p open whipple with bilroth 2 for main duct IPNM   Precautions/Limitations fall precautions;abdominal precautions   Weight-Bearing Status - LUE other (see comments)  (10# limit)   Weight-Bearing Status - RUE other (see comments)  (10# limit)   General Info Comments Activity: Ambulate with assist   Cognitive Status Examination   Orientation orientation to person, place and time   Level of Consciousness alert   Follows Commands (Cognition) WNL   Memory intact   Attention No deficits were identified   Organization/Problem Solving No deficits were identified   Executive Function No deficits were identified   Cognitive Comment Cognition appears intact.   Visual Perception   Visual Perception No deficits were identified   Sensory Examination   Sensory Quick Adds No deficits were identified   Integumentary/Edema   Integumentary/Edema no deficits were identifed   Posture   Posture not impaired   Range of Motion (ROM)   ROM Comment BUE WFL/WNL   Strength   Strength Comments BUE not foramally tested 2/2 to precautiosn   Transfer Skill: Bed to Chair/Chair to Bed   Level of [place]: Bed to Chair contact guard   Transfer Skill: Sit to Stand   Level of [place]: Sit/Stand contact guard   Lower Body Dressing   Level of [place]: Dress Lower Body moderate assist (50% patients effort)   Eating/Self Feeding   Level of [place]: Eating independent   Instrumental Activities of Daily Living (IADL)   Previous Responsibilities meal prep;housekeeping;medication management;finances;driving;   IADL Comments Pt A with some meal prep and housekeeping (does not vacuum). A with care of 3 grandchildren   Activities of Daily Living Analysis   Impairments Contributing to Impaired Activities of Daily Living post surgical precautions   General Therapy Interventions   Planned Therapy Interventions  "ADL retraining;IADL retraining;bed mobility training;strengthening;transfer training   Clinical Impression   Criteria for Skilled Therapeutic Interventions Met yes, treatment indicated   OT Diagnosis Decreased ADL/IADL precautions.   Influenced by the following impairments post-surgical precautions   Assessment of Occupational Performance 3-5 Performance Deficits   Identified Performance Deficits dressing, bathing, transfers, amb, home mgmt   Clinical Decision Making (Complexity) Low complexity   Therapy Frequency Daily   Predicted Duration of Therapy Intervention (days/wks) 4 days   Anticipated Discharge Disposition Home with Assist   Risks and Benefits of Treatment have been explained. Yes   Patient, Family & other staff in agreement with plan of care Yes   Clinical Impression Comments Pt would benefit from skilled OT to progress pt ADL/IADL IND and tolerance.   [facility] AM-PAC  \"6 Clicks\" Daily Activity Inpatient Short Form   1. Putting on and taking off regular lower body clothing? 3 - A Little   2. Bathing (including washing, rinsing, drying)? 3 - A Little   3. Toileting, which includes using toilet, bedpan or urinal? 3 - A Little   4. Putting on and taking off regular upper body clothing? 3 - A Little   5. Taking care of personal grooming such as brushing teeth? 4 - None   6. Eating meals? 4 - None   Daily Activity Raw Score (Score out of 24.Lower scores equate to lower levels of function) 20   Total Evaluation Time   Total Evaluation Time (Minutes) 5     "

## 2020-01-29 NOTE — PROGRESS NOTES
Surgery Progress Note    S:  No acute events overnight. Pt seen at bedside resting comfortably. Does complain of feeling dry. Pain well controlled with Dilaudid PCA. No nausea/emesis. Reports burping, but no flatus. Voiding/arora in place. NG tube. Denies n/v, CP, SOB, angina, fever. Ambulating.      O:  Temp:  [97.1  F (36.2  C)-98.5  F (36.9  C)] 97.1  F (36.2  C)  Pulse:  [] 109  Heart Rate:  [80-93] 93  Resp:  [11-19] 18  BP: ()/(56-79) 127/59  MAP:  [79 mmHg-106 mmHg] 106 mmHg  Arterial Line BP: (105-139)/(61-89) 139/81  SpO2:  [95 %-100 %] 97 %    I/O last 3 completed shifts:  In: 2103 [I.V.:2103]  Out: 1670 [Urine:1210; Emesis/NG output:300; Drains:160]    Gen: A&Ox3, NAD  Resp: non-labored breathing on RA  Abd: Soft, Non-distended, Non-tender, No rebound or guarding. JAZZMINE output is non-purulent and pink  Ext: warm and well perfused, ROM intact  Incisions: Non-erythematous, no drainage    Labs:  Complete Blood Count   Recent Labs   Lab 01/28/20  1545 01/28/20  0708   WBC 14.9*  --    HGB 12.3 12.0     --      Basic Metabolic Panel  Recent Labs   Lab 01/28/20  1545      POTASSIUM 3.9   CHLORIDE 110*   CO2 30   BUN 9   CR 0.78   *     Liver Function Tests  Recent Labs   Lab 01/28/20  1545   AST 47*   ALT 39   ALKPHOS 102   BILITOTAL 0.5   ALBUMIN 2.9*   INR 1.04     Pancreatic Enzymes  No lab results found in last 7 days.  Coagulation Profile  Recent Labs   Lab 01/28/20  1545   INR 1.04          A/P: Nathalie Bashir is a 54 year old female POD# 1 s/p open whipple with bilroth 2 for main duct IPNM    - check amylase for JAZZMINE drain  - Lovenox injection 40mg  - Protonix 40mg BID  - PT/OT  - Robaxin  - pain management consult, appreciate recs  - continue NG tube  - continue mIVF    Andrea Marquez, MS-3    RESIDENT ADDENDUM    I agree with the above medical student note above.    No issues overnight. Marginal pain control initially, better with increase PCA rate. Patient feels dehydrated and  "has a dry mouth; states she normally drinks a lot of water through out the day, and is asking for ice chips. Stood at bedside last night w/o issues. Denies n/v. No flatus or BM.    /66 (BP Location: Left arm)   Pulse 119   Temp 97.9  F (36.6  C) (Axillary)   Resp 20   Ht 1.727 m (5' 7.99\")   Wt 90.3 kg (199 lb 1.2 oz)   LMP 09/23/2014   SpO2 94%   BMI 30.28 kg/m    NAD  RRR  CTAB  Soft, ND, appropriately tender, dressing in place with dried blood staining. JAZZMINE x2 with serosanguinous drainage, NG bilious    UOP 1210/575cc  /800cc  JP1 55/53cc  JP2 105/27cc    Labs pending    Nathalie Bashir is a 54F with IPMN POD#1 s/p whipple. Doing well    NPO w/ meds and ice chips  NG to low continuous  dPCA, margoth robaxin  Pain service consult in setting of chronic pain  PPI  mIVF  Home meds  Drain amylase today  lovenox  PT/OT, ambulate    Deo Main MD PGY-5  Surgery Resident  Pg [phone]      "

## 2020-01-29 NOTE — PROGRESS NOTES
Spoke with Dr. Mallory at around 1100 re: pt's increased drainage from incision. Dressing saturated in serosanguinous drainage. Dr. Mallory was there to assess pt, stated nursing could change the dressing now. RN changed dressing using sterile technique. Staples intact. Incision approximated.

## 2020-01-29 NOTE — PLAN OF CARE
POD 1. AVSS on 2LPM nasal cannula. Capno in place. Pain managed with PCA Dilaudid 0.4mg q 10min. Pain more adequately controlled overnight. NG in place to low intermittent suction; pt denies nausea. Hernandez with adequate urine output. 2 JAZZMINE's on R abdomen with small amount of bloody/bright red drainage. Midline inc with dried drainage marked. Pt dangled and stood at bedside overnight with assist of 1. Continue to monitor.

## 2020-01-29 NOTE — PROGRESS NOTES
Admitted/transferred from:   2 RN full except under NG drsg and abd drsg. skin assessment completed by Kathy Enriquez RN and Araceli Hdez RN.  Skin assessment finding: surgical incision and bruising  Interventions/actions: drsgs monitored for signed of bleeding.    Will continue to monitor.

## 2020-01-30 ENCOUNTER — APPOINTMENT (OUTPATIENT)
Dept: OCCUPATIONAL THERAPY | Facility: CLINIC | Age: 55
DRG: 327 | End: 2020-01-30
Attending: SURGERY
Payer: MEDICARE

## 2020-01-30 ENCOUNTER — APPOINTMENT (OUTPATIENT)
Dept: GENERAL RADIOLOGY | Facility: CLINIC | Age: 55
DRG: 327 | End: 2020-01-30
Attending: PHYSICIAN ASSISTANT
Payer: MEDICARE

## 2020-01-30 PROBLEM — R34 LOW URINE OUTPUT: Status: ACTIVE | Noted: 2020-01-30

## 2020-01-30 PROBLEM — E83.39 HYPOPHOSPHATEMIA: Status: ACTIVE | Noted: 2020-01-30

## 2020-01-30 LAB
ALBUMIN SERPL-MCNC: 2.4 G/DL (ref 3.4–5)
ALBUMIN UR-MCNC: 30 MG/DL
ALP SERPL-CCNC: 108 U/L (ref 40–150)
ALT SERPL W P-5'-P-CCNC: 28 U/L (ref 0–50)
ANION GAP SERPL CALCULATED.3IONS-SCNC: 5 MMOL/L (ref 3–14)
APPEARANCE UR: CLEAR
AST SERPL W P-5'-P-CCNC: 36 U/L (ref 0–45)
BASOPHILS # BLD AUTO: 0 10E9/L (ref 0–0.2)
BASOPHILS NFR BLD AUTO: 0.2 %
BILIRUB SERPL-MCNC: 0.6 MG/DL (ref 0.2–1.3)
BILIRUB UR QL STRIP: NEGATIVE
BUN SERPL-MCNC: 8 MG/DL (ref 7–30)
CALCIUM SERPL-MCNC: 8.4 MG/DL (ref 8.5–10.1)
CHLORIDE SERPL-SCNC: 107 MMOL/L (ref 94–109)
CO2 SERPL-SCNC: 26 MMOL/L (ref 20–32)
COLOR UR AUTO: YELLOW
CREAT SERPL-MCNC: 0.66 MG/DL (ref 0.52–1.04)
DIFFERENTIAL METHOD BLD: ABNORMAL
EOSINOPHIL # BLD AUTO: 0 10E9/L (ref 0–0.7)
EOSINOPHIL NFR BLD AUTO: 0.1 %
ERYTHROCYTE [DISTWIDTH] IN BLOOD BY AUTOMATED COUNT: 14.4 % (ref 10–15)
GFR SERPL CREATININE-BSD FRML MDRD: >90 ML/MIN/{1.73_M2}
GLUCOSE BLDC GLUCOMTR-MCNC: 107 MG/DL (ref 70–99)
GLUCOSE BLDC GLUCOMTR-MCNC: 111 MG/DL (ref 70–99)
GLUCOSE BLDC GLUCOMTR-MCNC: 117 MG/DL (ref 70–99)
GLUCOSE BLDC GLUCOMTR-MCNC: 129 MG/DL (ref 70–99)
GLUCOSE BLDC GLUCOMTR-MCNC: 96 MG/DL (ref 70–99)
GLUCOSE SERPL-MCNC: 121 MG/DL (ref 70–99)
GLUCOSE UR STRIP-MCNC: NEGATIVE MG/DL
HCT VFR BLD AUTO: 35.1 % (ref 35–47)
HGB BLD-MCNC: 11.1 G/DL (ref 11.7–15.7)
HGB UR QL STRIP: ABNORMAL
IMM GRANULOCYTES # BLD: 0.2 10E9/L (ref 0–0.4)
IMM GRANULOCYTES NFR BLD: 0.8 %
KETONES UR STRIP-MCNC: NEGATIVE MG/DL
LACTATE BLD-SCNC: 0.5 MMOL/L (ref 0.7–2)
LEUKOCYTE ESTERASE UR QL STRIP: ABNORMAL
LYMPHOCYTES # BLD AUTO: 1.3 10E9/L (ref 0.8–5.3)
LYMPHOCYTES NFR BLD AUTO: 6.2 %
MAGNESIUM SERPL-MCNC: 1.8 MG/DL (ref 1.6–2.3)
MCH RBC QN AUTO: 28.8 PG (ref 26.5–33)
MCHC RBC AUTO-ENTMCNC: 31.6 G/DL (ref 31.5–36.5)
MCV RBC AUTO: 91 FL (ref 78–100)
MONOCYTES # BLD AUTO: 1.3 10E9/L (ref 0–1.3)
MONOCYTES NFR BLD AUTO: 6.2 %
MUCOUS THREADS #/AREA URNS LPF: PRESENT /LPF
NEUTROPHILS # BLD AUTO: 18.4 10E9/L (ref 1.6–8.3)
NEUTROPHILS NFR BLD AUTO: 86.5 %
NITRATE UR QL: NEGATIVE
NRBC # BLD AUTO: 0 10*3/UL
NRBC BLD AUTO-RTO: 0 /100
NT-PROBNP SERPL-MCNC: 412 PG/ML (ref 0–900)
PH UR STRIP: 6.5 PH (ref 5–7)
PHOSPHATE SERPL-MCNC: 2.1 MG/DL (ref 2.5–4.5)
PLATELET # BLD AUTO: 297 10E9/L (ref 150–450)
POTASSIUM SERPL-SCNC: 3.7 MMOL/L (ref 3.4–5.3)
PROT SERPL-MCNC: 5.8 G/DL (ref 6.8–8.8)
RBC # BLD AUTO: 3.85 10E12/L (ref 3.8–5.2)
RBC #/AREA URNS AUTO: 16 /HPF (ref 0–2)
SODIUM SERPL-SCNC: 138 MMOL/L (ref 133–144)
SOURCE: ABNORMAL
SP GR UR STRIP: 1.02 (ref 1–1.03)
SQUAMOUS #/AREA URNS AUTO: <1 /HPF (ref 0–1)
UROBILINOGEN UR STRIP-MCNC: NORMAL MG/DL (ref 0–2)
WBC # BLD AUTO: 21.3 10E9/L (ref 4–11)
WBC #/AREA URNS AUTO: 5 /HPF (ref 0–5)

## 2020-01-30 PROCEDURE — 80053 COMPREHEN METABOLIC PANEL: CPT | Performed by: SURGERY

## 2020-01-30 PROCEDURE — 83880 ASSAY OF NATRIURETIC PEPTIDE: CPT | Performed by: SURGERY

## 2020-01-30 PROCEDURE — 84100 ASSAY OF PHOSPHORUS: CPT | Performed by: SURGERY

## 2020-01-30 PROCEDURE — 81001 URINALYSIS AUTO W/SCOPE: CPT | Performed by: STUDENT IN AN ORGANIZED HEALTH CARE EDUCATION/TRAINING PROGRAM

## 2020-01-30 PROCEDURE — 25800030 ZZH RX IP 258 OP 636: Performed by: STUDENT IN AN ORGANIZED HEALTH CARE EDUCATION/TRAINING PROGRAM

## 2020-01-30 PROCEDURE — 40000141 ZZH STATISTIC PERIPHERAL IV START W/O US GUIDANCE

## 2020-01-30 PROCEDURE — 83735 ASSAY OF MAGNESIUM: CPT | Performed by: SURGERY

## 2020-01-30 PROCEDURE — 25000128 H RX IP 250 OP 636: Performed by: SURGERY

## 2020-01-30 PROCEDURE — 97535 SELF CARE MNGMENT TRAINING: CPT | Mod: GO

## 2020-01-30 PROCEDURE — 40000986 XR ABDOMEN PORT 1 VW

## 2020-01-30 PROCEDURE — 97530 THERAPEUTIC ACTIVITIES: CPT | Mod: GO

## 2020-01-30 PROCEDURE — 00000146 ZZHCL STATISTIC GLUCOSE BY METER IP

## 2020-01-30 PROCEDURE — 25800030 ZZH RX IP 258 OP 636: Performed by: PHYSICIAN ASSISTANT

## 2020-01-30 PROCEDURE — 25000125 ZZHC RX 250: Performed by: PHYSICIAN ASSISTANT

## 2020-01-30 PROCEDURE — 12000001 ZZH R&B MED SURG/OB UMMC

## 2020-01-30 PROCEDURE — C9113 INJ PANTOPRAZOLE SODIUM, VIA: HCPCS | Performed by: SURGERY

## 2020-01-30 PROCEDURE — 36415 COLL VENOUS BLD VENIPUNCTURE: CPT | Performed by: SURGERY

## 2020-01-30 PROCEDURE — 25000128 H RX IP 250 OP 636: Performed by: PHYSICIAN ASSISTANT

## 2020-01-30 PROCEDURE — 25000132 ZZH RX MED GY IP 250 OP 250 PS 637: Mod: GY | Performed by: SURGERY

## 2020-01-30 PROCEDURE — 25000128 H RX IP 250 OP 636: Performed by: STUDENT IN AN ORGANIZED HEALTH CARE EDUCATION/TRAINING PROGRAM

## 2020-01-30 PROCEDURE — 99024 POSTOP FOLLOW-UP VISIT: CPT | Performed by: PHYSICIAN ASSISTANT

## 2020-01-30 PROCEDURE — 85025 COMPLETE CBC W/AUTO DIFF WBC: CPT | Performed by: SURGERY

## 2020-01-30 PROCEDURE — 25800025 ZZH RX 258: Performed by: SURGERY

## 2020-01-30 PROCEDURE — 83605 ASSAY OF LACTIC ACID: CPT | Performed by: SURGERY

## 2020-01-30 RX ORDER — METOCLOPRAMIDE HYDROCHLORIDE 5 MG/ML
10 INJECTION INTRAMUSCULAR; INTRAVENOUS EVERY 6 HOURS
Status: DISCONTINUED | OUTPATIENT
Start: 2020-01-30 | End: 2020-02-02

## 2020-01-30 RX ADMIN — SODIUM CHLORIDE 500 ML: 9 INJECTION, SOLUTION INTRAVENOUS at 04:54

## 2020-01-30 RX ADMIN — METOCLOPRAMIDE 10 MG: 5 INJECTION, SOLUTION INTRAMUSCULAR; INTRAVENOUS at 15:29

## 2020-01-30 RX ADMIN — DEXTROSE MONOHYDRATE AND SODIUM CHLORIDE: 5; .45 INJECTION, SOLUTION INTRAVENOUS at 00:29

## 2020-01-30 RX ADMIN — METHOCARBAMOL 500 MG: 500 TABLET, FILM COATED ORAL at 08:26

## 2020-01-30 RX ADMIN — PANTOPRAZOLE SODIUM 40 MG: 40 INJECTION, POWDER, FOR SOLUTION INTRAVENOUS at 21:40

## 2020-01-30 RX ADMIN — CITALOPRAM HYDROBROMIDE 20 MG: 20 TABLET ORAL at 08:26

## 2020-01-30 RX ADMIN — METHOCARBAMOL 500 MG: 500 TABLET, FILM COATED ORAL at 21:40

## 2020-01-30 RX ADMIN — METOCLOPRAMIDE 10 MG: 5 INJECTION, SOLUTION INTRAMUSCULAR; INTRAVENOUS at 08:34

## 2020-01-30 RX ADMIN — METOCLOPRAMIDE 10 MG: 5 INJECTION, SOLUTION INTRAMUSCULAR; INTRAVENOUS at 21:40

## 2020-01-30 RX ADMIN — Medication: at 21:37

## 2020-01-30 RX ADMIN — PANTOPRAZOLE SODIUM 40 MG: 40 INJECTION, POWDER, FOR SOLUTION INTRAVENOUS at 08:26

## 2020-01-30 RX ADMIN — SODIUM CHLORIDE 500 ML: 9 INJECTION, SOLUTION INTRAVENOUS at 10:15

## 2020-01-30 RX ADMIN — DEXTROSE MONOHYDRATE AND SODIUM CHLORIDE: 5; .45 INJECTION, SOLUTION INTRAVENOUS at 17:24

## 2020-01-30 RX ADMIN — ENOXAPARIN SODIUM 40 MG: 40 INJECTION SUBCUTANEOUS at 08:26

## 2020-01-30 RX ADMIN — METHOCARBAMOL 500 MG: 500 TABLET, FILM COATED ORAL at 15:29

## 2020-01-30 RX ADMIN — POTASSIUM PHOSPHATE, MONOBASIC AND POTASSIUM PHOSPHATE, DIBASIC 15 MMOL: 224; 236 INJECTION, SOLUTION INTRAVENOUS at 12:04

## 2020-01-30 RX ADMIN — METHOCARBAMOL 500 MG: 500 TABLET, FILM COATED ORAL at 12:04

## 2020-01-30 RX ADMIN — Medication: at 08:18

## 2020-01-30 RX ADMIN — AMITRIPTYLINE HYDROCHLORIDE 75 MG: 75 TABLET, FILM COATED ORAL at 21:40

## 2020-01-30 ASSESSMENT — ACTIVITIES OF DAILY LIVING (ADL)
ADLS_ACUITY_SCORE: 18

## 2020-01-30 NOTE — PLAN OF CARE
Assumed care of Patient from 1900-0730. Tachycardic in the 110's, AOVSS on 2L NC. Pain controlled with robaxin and PCA dilaudid. NPO, except ice chips. Denies nausea. Midline incision with some shadowing, dried drainage. x2 JAZZMINE thumbprint suction. Hernandez with marginal output overnight, 500 NS bolus given. NG to LIS, brown output. Patient denies flatus, No BM overnight. Up with assist x1 PIV infusing IVMF and dilaudid. Continue with POC.

## 2020-01-30 NOTE — PROGRESS NOTES
Surgical Oncology Progress Note    Interval History:   500 ml bolus given this morning for low urine output. Pain controlled. Denies nausea. No flatus or stool yet.     Physical Exam:   Temp:  [96.5  F (35.8  C)-99.6  F (37.6  C)] 99.5  F (37.5  C)  Pulse:  [114-119] 114  Heart Rate:  [115-119] 115  Resp:  [18-21] 18  BP: (117-137)/(59-73) 137/73  SpO2:  [94 %-98 %] 96 %  General: Alert, oriented, appears comfortable, NAD.  Respiratory: breathing non labored  Abdomen: Abdomen is soft, non-tender, non-distended. Incision is clean, dry and intact. Drains with serosanguinous output.     Data:   All laboratory and imaging data in the past 24 hours reviewed  I/O last 3 completed shifts:  In: 3081.25 [P.O.:300; I.V.:2781.25]  Out: 2149.5 [Urine:725; Emesis/NG output:1350; Drains:74.5]  Recent Labs   Lab Test 01/29/20  0654 01/28/20  1545 01/28/20  0708 01/17/20  1115  01/02/15   WBC 17.3* 14.9*  --  8.2   < >  --    HGB 11.9 12.3 12.0 13.7   < >  --     291  --  346   < >  --    INR 1.28* 1.04  --   --   --  1.4    < > = values in this interval not displayed.      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 JAZZMINE drain amylase on POD1 was 849 from drain #1 and 427 from drain #2.     - Post operative pain and history of chronic pain: appreciate Pain Management recs. Available for pain is dPCA 0.4, robaxin, hydroxazine prn   - Ileus: continue NG tube LIS , NPO w/ ice chips. Reglan started for promotility. KUB to check NG tube placement.   - Home meds amitriptyline and citalopram restarted   - Low urine  output: 500 ml fluid bolus x2 his morning. Monitor I/O. mIVF 125 ml/hr. Keep arora catheter to monitor urine output.   - Sliding scale insulin  - Hypophosphatemia: monitor and replace.   - Lovenox for DVT prophylaxis, protonix IV    Seen with Chief resident who will discuss with Staff.     RAHEEL Miller-C   Surgical Oncology

## 2020-01-30 NOTE — PROGRESS NOTES
Surgery Progress Note    S:  No acute events overnight. Pt was resting comfortably on arm chair due to bed being too hard. Reports mild, diffuse abdominal pain exacerbated by movement, otherwise pain is well controlled. Continues to feel dehydrted. No nausea/emesis. Voiding/arora in place. Pt did not have flatus or BM. She usually takes senna at home to help with BM. NG tube in. Denies SOB, angina, fever. She is ambulating well.    O:  Temp:  [96.5  F (35.8  C)-99.6  F (37.6  C)] 99.5  F (37.5  C)  Pulse:  [114-119] 114  Heart Rate:  [115-119] 115  Resp:  [18-21] 18  BP: (117-137)/(59-73) 137/73  SpO2:  [94 %-98 %] 96 %    I/O last 3 completed shifts:  In: 1300 [P.O.:300; I.V.:1000]  Out: 3054.5 [Urine:1100; Emesis/NG output:1800; Drains:154.5]    Gen: A&Ox3, NAD  Resp: non-labored breathing on RA  Abd: Soft, Non-distended, No rebound or guarding, mildly tender around incision site and upper abdomen. JAZZMINE output, appears non-purulent and pink.  Ext: warm and well perfused  Incisions: some dried up drainage site, non-erythematous    Labs:  Complete Blood Count   Recent Labs   Lab 01/29/20  0654 01/28/20  1545 01/28/20  0708   WBC 17.3* 14.9*  --    HGB 11.9 12.3 12.0    291  --      Basic Metabolic Panel  Recent Labs   Lab 01/29/20  0701 01/28/20  1545    139   POTASSIUM 4.0 3.9   CHLORIDE 107 110*   CO2 27 30   BUN 10 9   CR 0.78 0.78   * 199*     Liver Function Tests  Recent Labs   Lab 01/29/20  0701 01/29/20  0654 01/28/20  1545   AST 35  --  47*   ALT 33  --  39   ALKPHOS 92  --  102   BILITOTAL 0.4  --  0.5   ALBUMIN 2.8*  --  2.9*   INR  --  1.28* 1.04     Pancreatic Enzymes  Recent Labs   Lab 01/29/20  0701   AMYLASE 111*     Coagulation Profile  Recent Labs   Lab 01/29/20  0654 01/28/20  1545   INR 1.28* 1.04          A/P: Nathalie Bashir is a 54 year old female with IPMN POD# 2 s/p open whipple procedure doing well    -NPO w/ meds and ice chips  -NG to low continuous  -DPCA, margoth  robaxin  -PPI  -mIVF  -Home meds  -PT/OT ambulate  -Take Hernandez out  -Lovenox  -Atarax PRN    Andrea Marquez, MS-3

## 2020-01-30 NOTE — PROVIDER NOTIFICATION
reporting low urine output 60ml out since 0400 (500 ml bolus given at 5am) therefore also questioning d/c arora order.

## 2020-01-30 NOTE — PROVIDER NOTIFICATION
Notified MD at 0400 AM regarding low urine output.      Spoke with: Melissa Pineda    Orders were obtained.    Comments: MD notified of low urine output. Patient only produced 100 ml of urine in the past 8 hours in her arora. Orders received for a one time 500ml NS bolus. Will continue to monitor urine output after bolus.

## 2020-01-30 NOTE — PLAN OF CARE
OT/7C:   Discharge Planner OT   Patient plan for discharge: Home with family assist.   Current status: Patient needing gentle encouragement to participate; motivated to walk in PM. SBA to complete log roll technique. Ambulates ~80 feet x2 with cane and CGA. Increased instability when completing balance challenges - one self corrected LOB. O2 VSS on 2L O2; HR tachy to 120's with activity. Reviewed abdominal precautions and application to ADL tasks. Mod A to don gown due to line management.   Barriers to return to prior living situation: Medical Status, Deconditioning, Post-op Precautions, Balance, Pain  Recommendations for discharge: Anticipate home with family assist. Not ready today.   Rationale for recommendations: Anticipate patient will be able to complete basic ADL tasks with assist from family (S.O. and son) by time of discharge with daily therapy services.        Entered by: Coretta Montanez 01/30/2020 3:48 PM

## 2020-01-31 ENCOUNTER — APPOINTMENT (OUTPATIENT)
Dept: OCCUPATIONAL THERAPY | Facility: CLINIC | Age: 55
DRG: 327 | End: 2020-01-31
Attending: SURGERY
Payer: MEDICARE

## 2020-01-31 LAB
AMYLASE FLD-CCNC: 38 U/L
AMYLASE FLD-CCNC: 56 U/L
AMYLASE SERPL-CCNC: 13 U/L (ref 30–110)
ANION GAP SERPL CALCULATED.3IONS-SCNC: 5 MMOL/L (ref 3–14)
BASOPHILS # BLD AUTO: 0 10E9/L (ref 0–0.2)
BASOPHILS NFR BLD AUTO: 0.2 %
BUN SERPL-MCNC: 5 MG/DL (ref 7–30)
CALCIUM SERPL-MCNC: 8.2 MG/DL (ref 8.5–10.1)
CHLORIDE SERPL-SCNC: 105 MMOL/L (ref 94–109)
CO2 SERPL-SCNC: 25 MMOL/L (ref 20–32)
COPATH REPORT: NORMAL
CREAT SERPL-MCNC: 0.63 MG/DL (ref 0.52–1.04)
DIFFERENTIAL METHOD BLD: ABNORMAL
EOSINOPHIL # BLD AUTO: 0.3 10E9/L (ref 0–0.7)
EOSINOPHIL NFR BLD AUTO: 1.7 %
ERYTHROCYTE [DISTWIDTH] IN BLOOD BY AUTOMATED COUNT: 14 % (ref 10–15)
GFR SERPL CREATININE-BSD FRML MDRD: >90 ML/MIN/{1.73_M2}
GLUCOSE BLDC GLUCOMTR-MCNC: 101 MG/DL (ref 70–99)
GLUCOSE BLDC GLUCOMTR-MCNC: 113 MG/DL (ref 70–99)
GLUCOSE BLDC GLUCOMTR-MCNC: 115 MG/DL (ref 70–99)
GLUCOSE BLDC GLUCOMTR-MCNC: 116 MG/DL (ref 70–99)
GLUCOSE BLDC GLUCOMTR-MCNC: 118 MG/DL (ref 70–99)
GLUCOSE BLDC GLUCOMTR-MCNC: 119 MG/DL (ref 70–99)
GLUCOSE SERPL-MCNC: 110 MG/DL (ref 70–99)
HCT VFR BLD AUTO: 33.5 % (ref 35–47)
HGB BLD-MCNC: 10.6 G/DL (ref 11.7–15.7)
IMM GRANULOCYTES # BLD: 0.1 10E9/L (ref 0–0.4)
IMM GRANULOCYTES NFR BLD: 0.5 %
LACTATE BLD-SCNC: 0.5 MMOL/L (ref 0.7–2)
LYMPHOCYTES # BLD AUTO: 1.3 10E9/L (ref 0.8–5.3)
LYMPHOCYTES NFR BLD AUTO: 7.3 %
MAGNESIUM SERPL-MCNC: 1.8 MG/DL (ref 1.6–2.3)
MCH RBC QN AUTO: 29.2 PG (ref 26.5–33)
MCHC RBC AUTO-ENTMCNC: 31.6 G/DL (ref 31.5–36.5)
MCV RBC AUTO: 92 FL (ref 78–100)
MONOCYTES # BLD AUTO: 1.2 10E9/L (ref 0–1.3)
MONOCYTES NFR BLD AUTO: 7.2 %
NEUTROPHILS # BLD AUTO: 14.3 10E9/L (ref 1.6–8.3)
NEUTROPHILS NFR BLD AUTO: 83.1 %
NRBC # BLD AUTO: 0 10*3/UL
NRBC BLD AUTO-RTO: 0 /100
PHOSPHATE SERPL-MCNC: 1.9 MG/DL (ref 2.5–4.5)
PHOSPHATE SERPL-MCNC: 3.1 MG/DL (ref 2.5–4.5)
PLATELET # BLD AUTO: 269 10E9/L (ref 150–450)
POTASSIUM SERPL-SCNC: 3.4 MMOL/L (ref 3.4–5.3)
RBC # BLD AUTO: 3.63 10E12/L (ref 3.8–5.2)
SODIUM SERPL-SCNC: 135 MMOL/L (ref 133–144)
SPECIMEN SOURCE FLD: NORMAL
SPECIMEN SOURCE FLD: NORMAL
WBC # BLD AUTO: 17.2 10E9/L (ref 4–11)

## 2020-01-31 PROCEDURE — 25000128 H RX IP 250 OP 636: Performed by: STUDENT IN AN ORGANIZED HEALTH CARE EDUCATION/TRAINING PROGRAM

## 2020-01-31 PROCEDURE — 82150 ASSAY OF AMYLASE: CPT | Performed by: SURGERY

## 2020-01-31 PROCEDURE — 25000125 ZZHC RX 250: Performed by: PHYSICIAN ASSISTANT

## 2020-01-31 PROCEDURE — 85025 COMPLETE CBC W/AUTO DIFF WBC: CPT | Performed by: SURGERY

## 2020-01-31 PROCEDURE — 97116 GAIT TRAINING THERAPY: CPT | Mod: GP

## 2020-01-31 PROCEDURE — 97530 THERAPEUTIC ACTIVITIES: CPT | Mod: GO

## 2020-01-31 PROCEDURE — 84100 ASSAY OF PHOSPHORUS: CPT | Performed by: SURGERY

## 2020-01-31 PROCEDURE — 97535 SELF CARE MNGMENT TRAINING: CPT | Mod: GO

## 2020-01-31 PROCEDURE — 83735 ASSAY OF MAGNESIUM: CPT | Performed by: SURGERY

## 2020-01-31 PROCEDURE — 83605 ASSAY OF LACTIC ACID: CPT | Performed by: SURGERY

## 2020-01-31 PROCEDURE — 36415 COLL VENOUS BLD VENIPUNCTURE: CPT | Performed by: SURGERY

## 2020-01-31 PROCEDURE — 25000132 ZZH RX MED GY IP 250 OP 250 PS 637: Mod: GY | Performed by: SURGERY

## 2020-01-31 PROCEDURE — 25800030 ZZH RX IP 258 OP 636: Performed by: PHYSICIAN ASSISTANT

## 2020-01-31 PROCEDURE — C9113 INJ PANTOPRAZOLE SODIUM, VIA: HCPCS | Performed by: SURGERY

## 2020-01-31 PROCEDURE — 80048 BASIC METABOLIC PNL TOTAL CA: CPT | Performed by: SURGERY

## 2020-01-31 PROCEDURE — 12000001 ZZH R&B MED SURG/OB UMMC

## 2020-01-31 PROCEDURE — 97161 PT EVAL LOW COMPLEX 20 MIN: CPT | Mod: GP

## 2020-01-31 PROCEDURE — 00000146 ZZHCL STATISTIC GLUCOSE BY METER IP

## 2020-01-31 PROCEDURE — 25000128 H RX IP 250 OP 636: Performed by: SURGERY

## 2020-01-31 PROCEDURE — 25000128 H RX IP 250 OP 636: Performed by: PHYSICIAN ASSISTANT

## 2020-01-31 PROCEDURE — 25800025 ZZH RX 258: Performed by: SURGERY

## 2020-01-31 RX ORDER — MAGNESIUM SULFATE 1 G/100ML
1 INJECTION INTRAVENOUS ONCE
Status: COMPLETED | OUTPATIENT
Start: 2020-01-31 | End: 2020-01-31

## 2020-01-31 RX ADMIN — METHOCARBAMOL 500 MG: 500 TABLET, FILM COATED ORAL at 16:53

## 2020-01-31 RX ADMIN — PANTOPRAZOLE SODIUM 40 MG: 40 INJECTION, POWDER, FOR SOLUTION INTRAVENOUS at 19:24

## 2020-01-31 RX ADMIN — MAGNESIUM SULFATE 1 G: 1 INJECTION INTRAVENOUS at 12:13

## 2020-01-31 RX ADMIN — METOCLOPRAMIDE 10 MG: 5 INJECTION, SOLUTION INTRAMUSCULAR; INTRAVENOUS at 08:28

## 2020-01-31 RX ADMIN — DEXTROSE MONOHYDRATE AND SODIUM CHLORIDE: 5; .45 INJECTION, SOLUTION INTRAVENOUS at 00:57

## 2020-01-31 RX ADMIN — METOCLOPRAMIDE 10 MG: 5 INJECTION, SOLUTION INTRAMUSCULAR; INTRAVENOUS at 02:40

## 2020-01-31 RX ADMIN — METHOCARBAMOL 500 MG: 500 TABLET, FILM COATED ORAL at 12:18

## 2020-01-31 RX ADMIN — DEXTROSE MONOHYDRATE AND SODIUM CHLORIDE: 5; .45 INJECTION, SOLUTION INTRAVENOUS at 09:13

## 2020-01-31 RX ADMIN — METHOCARBAMOL 500 MG: 500 TABLET, FILM COATED ORAL at 19:25

## 2020-01-31 RX ADMIN — Medication: at 14:24

## 2020-01-31 RX ADMIN — AMITRIPTYLINE HYDROCHLORIDE 75 MG: 75 TABLET, FILM COATED ORAL at 21:27

## 2020-01-31 RX ADMIN — POTASSIUM PHOSPHATE, MONOBASIC AND POTASSIUM PHOSPHATE, DIBASIC 20 MMOL: 224; 236 INJECTION, SOLUTION INTRAVENOUS at 13:16

## 2020-01-31 RX ADMIN — PANTOPRAZOLE SODIUM 40 MG: 40 INJECTION, POWDER, FOR SOLUTION INTRAVENOUS at 08:29

## 2020-01-31 RX ADMIN — METOCLOPRAMIDE 10 MG: 5 INJECTION, SOLUTION INTRAMUSCULAR; INTRAVENOUS at 14:28

## 2020-01-31 RX ADMIN — CITALOPRAM HYDROBROMIDE 20 MG: 20 TABLET ORAL at 08:27

## 2020-01-31 RX ADMIN — DEXTROSE MONOHYDRATE AND SODIUM CHLORIDE: 5; .45 INJECTION, SOLUTION INTRAVENOUS at 21:29

## 2020-01-31 RX ADMIN — METHOCARBAMOL 500 MG: 500 TABLET, FILM COATED ORAL at 08:27

## 2020-01-31 RX ADMIN — METOCLOPRAMIDE 10 MG: 5 INJECTION, SOLUTION INTRAMUSCULAR; INTRAVENOUS at 19:25

## 2020-01-31 RX ADMIN — ENOXAPARIN SODIUM 40 MG: 40 INJECTION SUBCUTANEOUS at 08:34

## 2020-01-31 ASSESSMENT — PAIN DESCRIPTION - DESCRIPTORS
DESCRIPTORS: ACHING;DISCOMFORT

## 2020-01-31 ASSESSMENT — ACTIVITIES OF DAILY LIVING (ADL)
ADLS_ACUITY_SCORE: 18

## 2020-01-31 NOTE — PLAN OF CARE
Status: POD2 s/p whipple  Precautions: standard  VS: VSS  Neuro: intact  Resp: WNL  GI: ileus, reglan started for promotility, NG to LIS good output  : arora output improved post 2nd 500ml NS bolus, continuing to monitor closely  IV: PIV infusing D51/2 NS, IV dilaudid, Phos replaced recheck am  Skin: JAZZMINE X2, midline incision dried drainage unchanged  Pain: PCA pump 0.4mg IV Dilaudid  Activity: up x1  Social: visitors in evening  Plan of Care: Continue to monitor and follow POC

## 2020-01-31 NOTE — PLAN OF CARE
OT/7C:  Discharge Planner OT   Patient plan for discharge: Home with assist.   Current status: SBA to complete bed mobility with HOB elevated. Completes seated/standing sponge bath with SBA for UB/groin, Mod A to wash back. Demonstrates good adherence to abdominal precautions throughout session. SBA to complete LB dressing. Challenged to progress bed mobility with decreased HOB elevation. Able to complete with SBA though decreased adherence to log roll technique.   Barriers to return to prior living situation: Medical Status, Abdominal Precautions, Balance  Recommendations for discharge: Anticipate home with assist for heavy ADL/IADL tasks to maintain precautions.   Rationale for recommendations: Patient making progress with therapies while IP. Anticipate mobility tolerance will improve once family brings shoes per PT note. Will continue to benefit from IP services to maximize ADL/IADL IND and safety.        Entered by: Coretta Montanez 01/31/2020 12:51 PM

## 2020-01-31 NOTE — PLAN OF CARE
VSS on room air. Pain managed with PCA Dilaudid 0.4mg q 10min. NG in place with moderate brown output. Denies nausea. 2 R JPs with scant output, amylase fluid sent down this AM. Abd inc with marked drainage. PIV infusing D5 1/2 NS. Hernandez with adequate urine output. Not passing gas. Up with assist of 1. Continue to monitor

## 2020-01-31 NOTE — PROGRESS NOTES
01/31/20 1200   Quick Adds   Type of Visit Initial PT Evaluation   Living Environment   Lives With significant other;child(linette), adult;grandchild(linette)   Living Arrangements house   Home Accessibility stairs to enter home;stairs within home   Number of Stairs, Main Entrance 2   Stair Railings, Main Entrance railing on right side (ascending)   Number of Stairs, Within Home, Primary 10   Transportation Anticipated car, drives self;family or friend will provide   Living Environment Comment Lives with SO, son and grandchildren. After operations, pt stays on first level. Has a wall on R side to enter   Self-Care   Usual Activity Tolerance good   Current Activity Tolerance moderate   Equipment Currently Used at Home cane, straight;raised toilet;shower chair;dressing device   Activity/Exercise/Self-Care Comment Reports mod-I for ADLs. Limited activity due to R foot and residual L hip pain after JOSE   Functional Level Prior   Ambulation 1-->assistive equipment   Transferring 1-->assistive equipment   Toileting 1-->assistive equipment  (raised toilet )   Bathing 0-->independent   Communication 0-->understands/communicates without difficulty   Swallowing 0-->swallows foods/liquids without difficulty   Cognition 0 - no cognition issues reported   Fall history within last six months no   Number of times patient has fallen within last six months 0   Which of the above functional risks had a recent onset or change? ambulation;transferring;toileting;bathing;dressing   Prior Functional Level Comment IND mobility in house, uses SEC in community due to L hip and R foot pain. Does endorse some balance balance deficits, but not overally concerning   General Information   Onset of Illness/Injury or Date of Surgery - Date 01/28/20   Referring Physician Dr. Mallory   Patient/Family Goals Statement Home next week, improve balance/strength   Pertinent History of Current Problem (include personal factors and/or comorbidities that impact the  POC) Nathalie Bashir is a 54 year old female POD# 3 s/p open whipple with bilroth 2 for main duct IPNM. OT recommending PT initates   Precautions/Limitations fall precautions;abdominal precautions   Cognitive Status Examination   Orientation orientation to person, place and time   Level of Consciousness alert   Follows Commands and Answers Questions 100% of the time   Pain Assessment   Patient Currently in Pain Yes, see Vital Sign flowsheet   Integumentary/Edema   Integumentary/Edema no deficits were identifed   Posture    Posture Forward head position   Range of Motion (ROM)   ROM Comment WFL   Strength   Strength Comments LE not tested due to high abdominal pain to avoid strain   Bed Mobility   Bed Mobility Comments Mod-I supine>sit   Transfer Skills   Transfer Comments SBA sit<>stand   Gait   Gait Comments CGA with SEC, inconsistent cane placement, short step-length   Balance   Balance Comments Unsteadiness with gait with SEC, no LOB   Sensory Examination   Sensory Perception Comments Baseline B foot numbness R>L   General Therapy Interventions   Planned Therapy Interventions bed mobility training;gait training;strengthening;transfer training;balance training;risk factor education;home program guidelines;progressive activity/exercise   Clinical Impression   Criteria for Skilled Therapeutic Intervention yes, treatment indicated   PT Diagnosis Impaired functional mobility   Influenced by the following impairments Pain, balance impairments, precautions   Functional limitations due to impairments Decreased IND with gait and mobility   Clinical Presentation Stable/Uncomplicated   Clinical Presentation Rationale Clinical judgement/medically stable   Clinical Decision Making (Complexity) Low complexity   Therapy Frequency 3x/week   Predicted Duration of Therapy Intervention (days/wks) 5 days   Anticipated Discharge Disposition Home with Assist   Risk & Benefits of therapy have been explained Yes   Patient, Family & other  "staff in agreement with plan of care Yes   Clinical Impression Comments Anticipate typical post-op unsteadiness related to pain/medications. Will continue to follow 3x/week to address balance deficits and gait   [facility] TM \"6 Clicks\"   2016, Trustees of [facility], under license to [employer].  All rights reserved.   6 Clicks Short Forms Basic Mobility Inpatient Short Form   [facility]  \"6 Clicks\" V.2 Basic Mobility Inpatient Short Form   1. Turning from your back to your side while in a flat bed without using bedrails? 4 - None   2. Moving from lying on your back to sitting on the side of a flat bed without using bedrails? 4 - None   3. Moving to and from a bed to a chair (including a wheelchair)? 4 - None   4. Standing up from a chair using your arms (e.g., wheelchair, or bedside chair)? 4 - None   5. To walk in hospital room? 3 - A Little   6. Climbing 3-5 steps with a railing? 3 - A Little   Basic Mobility Raw Score (Score out of 24.Lower scores equate to lower levels of function) 22   Total Evaluation Time   Total Evaluation Time (Minutes) 6     "

## 2020-01-31 NOTE — PROGRESS NOTES
Surgery Progress Note    S: no issues overnight. Pain controlled. UOP improved. Ng output improving. No flatus. No BM. Burping some.    O:  Vital signs:  Temp: 99.1  F (37.3  C) Temp src: Oral BP: 130/69 Pulse: 114 Heart Rate: 109 Resp: 16 SpO2: 99 % O2 Device: Nasal cannula Oxygen Delivery: 2 LPM    NAD  RRR  CTAB  Soft, NT, ND, incision c/d/i; JAZZMINE drain serosang    uop 660/70cc  Ng 1200/250cc  JAZZMINE 1: 53/20  JP2: 23/10    A/p:  Nathalie Bashir is a 54 year old female hx of main duct IPMN now POD3 s/p whipple.     - Post operative pain and history of chronic pain: appreciate Pain Management recs. Available for pain is dPCA 0.4, robaxin, hydroxazine prn   - Ileus: continue NG tube LIS , NPO w/ ice chips. Reglan started for promotility.  - Home meds amitriptyline and citalopram restarted   - Low urine output: Monitor I/O. mIVF 125 ml/hr. Keep arora catheter to monitor urine output. arora out  - Sliding scale insulin  - Hypophosphatemia: monitor and replace.   - Lovenox for DVT prophylaxis, protonix IV    Doe Main MD PGY-5  Surgery Resident

## 2020-01-31 NOTE — PLAN OF CARE
7C    Discharge Planner PT   Patient plan for discharge: Home with assist  Current status: Mod-I supine>sit. Pt recalls abdominal precautions from previous OT visits. Gait progressing to SBA with SEC, improves with distance, but also become more painful. Ambulates 225' x 2. SBA for stairs. Discussed anticipated gait improvements with shoes due to baseline R foot issues.  Barriers to return to prior living situation: Medical, pain, impaired balance  Recommendations for discharge: Home with assist for ADL/IADL as needed  Rationale for recommendations: Anticipate patient will progress well given PLOF and current functional status and will progress to safe home discharge with further skilled PT intervention. Anticipate typical post-op unsteadiness related to pain/medications. Will continue to follow 3x/week to address balance deficits and gait.       Entered by: Jairon Bolaños 01/31/2020 12:22 PM

## 2020-01-31 NOTE — PLAN OF CARE
VSS. Weaned off supplemental O2. Now on RA with sats in high 90's. A&O x4. Pain managed with PCA of dilaudid. Arora catheter removed this AM and patient has voided post arora removal. NG to LIS.  No BM or flatus. Taking in ice chips. Up SBA. Calling appropriately. Continue with POC.

## 2020-02-01 ENCOUNTER — APPOINTMENT (OUTPATIENT)
Dept: OCCUPATIONAL THERAPY | Facility: CLINIC | Age: 55
DRG: 327 | End: 2020-02-01
Attending: SURGERY
Payer: MEDICARE

## 2020-02-01 LAB
ANION GAP SERPL CALCULATED.3IONS-SCNC: 6 MMOL/L (ref 3–14)
BASOPHILS # BLD AUTO: 0 10E9/L (ref 0–0.2)
BASOPHILS NFR BLD AUTO: 0.2 %
BUN SERPL-MCNC: 3 MG/DL (ref 7–30)
CALCIUM SERPL-MCNC: 8.1 MG/DL (ref 8.5–10.1)
CHLORIDE SERPL-SCNC: 106 MMOL/L (ref 94–109)
CO2 SERPL-SCNC: 27 MMOL/L (ref 20–32)
CREAT SERPL-MCNC: 0.64 MG/DL (ref 0.52–1.04)
DIFFERENTIAL METHOD BLD: ABNORMAL
EOSINOPHIL # BLD AUTO: 0.3 10E9/L (ref 0–0.7)
EOSINOPHIL NFR BLD AUTO: 2.5 %
ERYTHROCYTE [DISTWIDTH] IN BLOOD BY AUTOMATED COUNT: 13.8 % (ref 10–15)
GFR SERPL CREATININE-BSD FRML MDRD: >90 ML/MIN/{1.73_M2}
GLUCOSE BLDC GLUCOMTR-MCNC: 104 MG/DL (ref 70–99)
GLUCOSE BLDC GLUCOMTR-MCNC: 114 MG/DL (ref 70–99)
GLUCOSE BLDC GLUCOMTR-MCNC: 125 MG/DL (ref 70–99)
GLUCOSE BLDC GLUCOMTR-MCNC: 129 MG/DL (ref 70–99)
GLUCOSE BLDC GLUCOMTR-MCNC: 130 MG/DL (ref 70–99)
GLUCOSE SERPL-MCNC: 128 MG/DL (ref 70–99)
HCT VFR BLD AUTO: 31.7 % (ref 35–47)
HGB BLD-MCNC: 10.2 G/DL (ref 11.7–15.7)
IMM GRANULOCYTES # BLD: 0.1 10E9/L (ref 0–0.4)
IMM GRANULOCYTES NFR BLD: 0.5 %
LACTATE BLD-SCNC: 0.9 MMOL/L (ref 0.7–2)
LYMPHOCYTES # BLD AUTO: 1 10E9/L (ref 0.8–5.3)
LYMPHOCYTES NFR BLD AUTO: 7.5 %
MAGNESIUM SERPL-MCNC: 1.9 MG/DL (ref 1.6–2.3)
MCH RBC QN AUTO: 28.9 PG (ref 26.5–33)
MCHC RBC AUTO-ENTMCNC: 32.2 G/DL (ref 31.5–36.5)
MCV RBC AUTO: 90 FL (ref 78–100)
MONOCYTES # BLD AUTO: 1 10E9/L (ref 0–1.3)
MONOCYTES NFR BLD AUTO: 7.6 %
NEUTROPHILS # BLD AUTO: 10.7 10E9/L (ref 1.6–8.3)
NEUTROPHILS NFR BLD AUTO: 81.7 %
NRBC # BLD AUTO: 0 10*3/UL
NRBC BLD AUTO-RTO: 0 /100
PHOSPHATE SERPL-MCNC: 2.6 MG/DL (ref 2.5–4.5)
PLATELET # BLD AUTO: 328 10E9/L (ref 150–450)
POTASSIUM SERPL-SCNC: 3.3 MMOL/L (ref 3.4–5.3)
RBC # BLD AUTO: 3.53 10E12/L (ref 3.8–5.2)
SODIUM SERPL-SCNC: 138 MMOL/L (ref 133–144)
WBC # BLD AUTO: 13.1 10E9/L (ref 4–11)

## 2020-02-01 PROCEDURE — 25000128 H RX IP 250 OP 636: Performed by: STUDENT IN AN ORGANIZED HEALTH CARE EDUCATION/TRAINING PROGRAM

## 2020-02-01 PROCEDURE — 80048 BASIC METABOLIC PNL TOTAL CA: CPT | Performed by: SURGERY

## 2020-02-01 PROCEDURE — 36415 COLL VENOUS BLD VENIPUNCTURE: CPT | Performed by: SURGERY

## 2020-02-01 PROCEDURE — 25800025 ZZH RX 258: Performed by: SURGERY

## 2020-02-01 PROCEDURE — C9113 INJ PANTOPRAZOLE SODIUM, VIA: HCPCS | Performed by: SURGERY

## 2020-02-01 PROCEDURE — 85025 COMPLETE CBC W/AUTO DIFF WBC: CPT | Performed by: SURGERY

## 2020-02-01 PROCEDURE — 25000132 ZZH RX MED GY IP 250 OP 250 PS 637: Mod: GY | Performed by: SURGERY

## 2020-02-01 PROCEDURE — 83735 ASSAY OF MAGNESIUM: CPT | Performed by: SURGERY

## 2020-02-01 PROCEDURE — 12000001 ZZH R&B MED SURG/OB UMMC

## 2020-02-01 PROCEDURE — 83605 ASSAY OF LACTIC ACID: CPT | Performed by: SURGERY

## 2020-02-01 PROCEDURE — 84100 ASSAY OF PHOSPHORUS: CPT | Performed by: SURGERY

## 2020-02-01 PROCEDURE — 00000146 ZZHCL STATISTIC GLUCOSE BY METER IP

## 2020-02-01 PROCEDURE — 97535 SELF CARE MNGMENT TRAINING: CPT | Mod: GO

## 2020-02-01 PROCEDURE — 25000128 H RX IP 250 OP 636: Performed by: SURGERY

## 2020-02-01 PROCEDURE — 25000128 H RX IP 250 OP 636: Performed by: PHYSICIAN ASSISTANT

## 2020-02-01 PROCEDURE — 97530 THERAPEUTIC ACTIVITIES: CPT | Mod: GO

## 2020-02-01 RX ADMIN — METHOCARBAMOL 500 MG: 500 TABLET, FILM COATED ORAL at 20:21

## 2020-02-01 RX ADMIN — HYDROXYZINE HYDROCHLORIDE 10 MG: 10 TABLET, FILM COATED ORAL at 08:21

## 2020-02-01 RX ADMIN — HYDROXYZINE HYDROCHLORIDE 10 MG: 10 TABLET, FILM COATED ORAL at 14:07

## 2020-02-01 RX ADMIN — METHOCARBAMOL 500 MG: 500 TABLET, FILM COATED ORAL at 16:31

## 2020-02-01 RX ADMIN — METOCLOPRAMIDE 10 MG: 5 INJECTION, SOLUTION INTRAMUSCULAR; INTRAVENOUS at 02:16

## 2020-02-01 RX ADMIN — AMITRIPTYLINE HYDROCHLORIDE 75 MG: 75 TABLET, FILM COATED ORAL at 21:07

## 2020-02-01 RX ADMIN — PANTOPRAZOLE SODIUM 40 MG: 40 INJECTION, POWDER, FOR SOLUTION INTRAVENOUS at 08:54

## 2020-02-01 RX ADMIN — Medication: at 10:45

## 2020-02-01 RX ADMIN — METOCLOPRAMIDE 10 MG: 5 INJECTION, SOLUTION INTRAMUSCULAR; INTRAVENOUS at 08:54

## 2020-02-01 RX ADMIN — DEXTROSE MONOHYDRATE AND SODIUM CHLORIDE: 5; .45 INJECTION, SOLUTION INTRAVENOUS at 05:19

## 2020-02-01 RX ADMIN — METOCLOPRAMIDE 10 MG: 5 INJECTION, SOLUTION INTRAMUSCULAR; INTRAVENOUS at 13:49

## 2020-02-01 RX ADMIN — CITALOPRAM HYDROBROMIDE 20 MG: 20 TABLET ORAL at 08:21

## 2020-02-01 RX ADMIN — METHOCARBAMOL 500 MG: 500 TABLET, FILM COATED ORAL at 08:21

## 2020-02-01 RX ADMIN — PANTOPRAZOLE SODIUM 40 MG: 40 INJECTION, POWDER, FOR SOLUTION INTRAVENOUS at 20:21

## 2020-02-01 RX ADMIN — METOCLOPRAMIDE 10 MG: 5 INJECTION, SOLUTION INTRAMUSCULAR; INTRAVENOUS at 20:21

## 2020-02-01 RX ADMIN — HYDROXYZINE HYDROCHLORIDE 10 MG: 10 TABLET, FILM COATED ORAL at 20:21

## 2020-02-01 RX ADMIN — DEXTROSE MONOHYDRATE AND SODIUM CHLORIDE: 5; .45 INJECTION, SOLUTION INTRAVENOUS at 14:07

## 2020-02-01 RX ADMIN — METHOCARBAMOL 500 MG: 500 TABLET, FILM COATED ORAL at 12:13

## 2020-02-01 RX ADMIN — ENOXAPARIN SODIUM 40 MG: 40 INJECTION SUBCUTANEOUS at 09:00

## 2020-02-01 ASSESSMENT — ACTIVITIES OF DAILY LIVING (ADL)
ADLS_ACUITY_SCORE: 18

## 2020-02-01 ASSESSMENT — PAIN DESCRIPTION - DESCRIPTORS
DESCRIPTORS: ACHING

## 2020-02-01 NOTE — PROGRESS NOTES
Surgery Progress Note    S: No issues overnight. NG out came out accidentally over night. Denies n/v. No flatus or BM. Pain controlled    O:  Vital signs:  Temp: 99.5  F (37.5  C) Temp src: Oral BP: 134/75   Heart Rate: 109 Resp: 18 SpO2: 94 % O2 Device: None (Room air)      NAD  RRR  CTAB  Soft, NT, ND, incision c/d/i; scant leakage from drain sites    uop 2150/1150cc      A/p:  Nathalie Bashir is a 54 year old female hx of main duct IPMN now POD#4 s/p whipple.     - Post operative pain and history of chronic pain: appreciate Pain Management recs. Available for pain is dPCA 0.4, robaxin, hydroxazine prn   - clears  - Reglan started for promotility.  - Home meds amitriptyline and citalopram restarted   - Low urine output: Monitor I/O. mIVF 75 ml/hr.  - Sliding scale insulin  - Hypophosphatemia: monitor and replace.   - Lovenox for DVT prophylaxis, protonix IV    Doe Main MD PGY-5  Surgery Resident

## 2020-02-01 NOTE — PLAN OF CARE
A x O, VSS on RA, slightly tachy.Pain managed w/ scheduled robaxin and PCA Dilaudid. Denies n/v. NPO with ice/meds. NG to LIS w/ green brown output. Per Pt report, NG fell out during the night. MD aware. BG checks q4h. Old Fabian sites w/ gauze and ABDs, changed overnight for leaking.Voiding good amounts. Denies flatus and BM. SBA w/ cane. Resting between cares. Continue POC.

## 2020-02-01 NOTE — PLAN OF CARE
OT 7C  Discharge Planner OT   Patient plan for discharge: Home  Current status: SBA supine<>EOB with HOB slightly elevated. SBA sit<>stand x2 reps throughout session with SEC. SBA doffing/donning socks while seated EOB. Pt completed ~300ft of functional mobility with SEC and SBA.   Barriers to return to prior living situation: fatigue, post surgical precautions, acute medical needs  Recommendations for discharge: Home with assist as needed  Rationale for recommendations: Pt is primarily SBA for ADL completion, however would benefit from continued skilled therapy while IP to increase activity tolerance and independence with IADLs       Entered by: Sarah Wolfe 02/01/2020 2:24 PM

## 2020-02-01 NOTE — PLAN OF CARE
Slightly tachycardic in the low 100s, OVSS on RA. A+OX4. Pain controlled with PCA Dilaudid 0.4mg bumps, scheduled robaxin. Denies nausea. NPO with ice/meds. NG to LIS, clamped for meds. ML incision with dressing from [facility] this morning c/d/I, dried serous drainage at the bottom. Old JAZZMINE sites covered with gauze c/d/I. PIV with MIVF. Voiding good amounts. Not passing gas, no BM. Up with SBA and cane, declined ambulating in [place] this shift. IS independently, pneumo boots on. Phos was replaced, recheck 3.1.     Continue post-op cares. Possible discontinue of NG tomorrow.

## 2020-02-02 LAB
ANION GAP SERPL CALCULATED.3IONS-SCNC: 4 MMOL/L (ref 3–14)
BASOPHILS # BLD AUTO: 0.1 10E9/L (ref 0–0.2)
BASOPHILS NFR BLD AUTO: 0.5 %
BUN SERPL-MCNC: 2 MG/DL (ref 7–30)
CALCIUM SERPL-MCNC: 8.4 MG/DL (ref 8.5–10.1)
CHLORIDE SERPL-SCNC: 105 MMOL/L (ref 94–109)
CO2 SERPL-SCNC: 30 MMOL/L (ref 20–32)
CREAT SERPL-MCNC: 0.74 MG/DL (ref 0.52–1.04)
DIFFERENTIAL METHOD BLD: ABNORMAL
EOSINOPHIL # BLD AUTO: 0.5 10E9/L (ref 0–0.7)
EOSINOPHIL NFR BLD AUTO: 4.9 %
ERYTHROCYTE [DISTWIDTH] IN BLOOD BY AUTOMATED COUNT: 13.7 % (ref 10–15)
GFR SERPL CREATININE-BSD FRML MDRD: >90 ML/MIN/{1.73_M2}
GLUCOSE BLDC GLUCOMTR-MCNC: 106 MG/DL (ref 70–99)
GLUCOSE BLDC GLUCOMTR-MCNC: 108 MG/DL (ref 70–99)
GLUCOSE BLDC GLUCOMTR-MCNC: 119 MG/DL (ref 70–99)
GLUCOSE BLDC GLUCOMTR-MCNC: 123 MG/DL (ref 70–99)
GLUCOSE BLDC GLUCOMTR-MCNC: 134 MG/DL (ref 70–99)
GLUCOSE BLDC GLUCOMTR-MCNC: 140 MG/DL (ref 70–99)
GLUCOSE SERPL-MCNC: 143 MG/DL (ref 70–99)
HCT VFR BLD AUTO: 30.4 % (ref 35–47)
HGB BLD-MCNC: 9.8 G/DL (ref 11.7–15.7)
IMM GRANULOCYTES # BLD: 0 10E9/L (ref 0–0.4)
IMM GRANULOCYTES NFR BLD: 0.4 %
LYMPHOCYTES # BLD AUTO: 1.2 10E9/L (ref 0.8–5.3)
LYMPHOCYTES NFR BLD AUTO: 12.6 %
MAGNESIUM SERPL-MCNC: 1.8 MG/DL (ref 1.6–2.3)
MCH RBC QN AUTO: 28.7 PG (ref 26.5–33)
MCHC RBC AUTO-ENTMCNC: 32.2 G/DL (ref 31.5–36.5)
MCV RBC AUTO: 89 FL (ref 78–100)
MONOCYTES # BLD AUTO: 0.8 10E9/L (ref 0–1.3)
MONOCYTES NFR BLD AUTO: 8.6 %
NEUTROPHILS # BLD AUTO: 7.1 10E9/L (ref 1.6–8.3)
NEUTROPHILS NFR BLD AUTO: 73 %
NRBC # BLD AUTO: 0 10*3/UL
NRBC BLD AUTO-RTO: 0 /100
PHOSPHATE SERPL-MCNC: 3 MG/DL (ref 2.5–4.5)
PLATELET # BLD AUTO: 355 10E9/L (ref 150–450)
POTASSIUM SERPL-SCNC: 4 MMOL/L (ref 3.4–5.3)
RBC # BLD AUTO: 3.41 10E12/L (ref 3.8–5.2)
SODIUM SERPL-SCNC: 139 MMOL/L (ref 133–144)
WBC # BLD AUTO: 9.8 10E9/L (ref 4–11)

## 2020-02-02 PROCEDURE — 80048 BASIC METABOLIC PNL TOTAL CA: CPT | Performed by: SURGERY

## 2020-02-02 PROCEDURE — 84100 ASSAY OF PHOSPHORUS: CPT | Performed by: SURGERY

## 2020-02-02 PROCEDURE — 25000128 H RX IP 250 OP 636: Performed by: SURGERY

## 2020-02-02 PROCEDURE — 85025 COMPLETE CBC W/AUTO DIFF WBC: CPT | Performed by: SURGERY

## 2020-02-02 PROCEDURE — 25000128 H RX IP 250 OP 636: Performed by: STUDENT IN AN ORGANIZED HEALTH CARE EDUCATION/TRAINING PROGRAM

## 2020-02-02 PROCEDURE — 00000146 ZZHCL STATISTIC GLUCOSE BY METER IP

## 2020-02-02 PROCEDURE — 25800025 ZZH RX 258: Performed by: SURGERY

## 2020-02-02 PROCEDURE — 25000132 ZZH RX MED GY IP 250 OP 250 PS 637: Mod: GY | Performed by: SURGERY

## 2020-02-02 PROCEDURE — 83735 ASSAY OF MAGNESIUM: CPT | Performed by: SURGERY

## 2020-02-02 PROCEDURE — 12000001 ZZH R&B MED SURG/OB UMMC

## 2020-02-02 PROCEDURE — 25000128 H RX IP 250 OP 636: Performed by: PHYSICIAN ASSISTANT

## 2020-02-02 PROCEDURE — 36415 COLL VENOUS BLD VENIPUNCTURE: CPT | Performed by: SURGERY

## 2020-02-02 PROCEDURE — C9113 INJ PANTOPRAZOLE SODIUM, VIA: HCPCS | Performed by: SURGERY

## 2020-02-02 RX ORDER — MAGNESIUM SULFATE 1 G/100ML
1 INJECTION INTRAVENOUS ONCE
Status: COMPLETED | OUTPATIENT
Start: 2020-02-02 | End: 2020-02-02

## 2020-02-02 RX ORDER — AMOXICILLIN 250 MG
1 CAPSULE ORAL AT BEDTIME
Status: DISCONTINUED | OUTPATIENT
Start: 2020-02-02 | End: 2020-02-04 | Stop reason: HOSPADM

## 2020-02-02 RX ORDER — POLYETHYLENE GLYCOL 3350 17 G/17G
17 POWDER, FOR SOLUTION ORAL DAILY
Status: DISCONTINUED | OUTPATIENT
Start: 2020-02-02 | End: 2020-02-04 | Stop reason: HOSPADM

## 2020-02-02 RX ORDER — PANTOPRAZOLE SODIUM 40 MG/1
40 TABLET, DELAYED RELEASE ORAL
Status: DISCONTINUED | OUTPATIENT
Start: 2020-02-03 | End: 2020-02-04 | Stop reason: HOSPADM

## 2020-02-02 RX ORDER — METOCLOPRAMIDE 10 MG/1
10 TABLET ORAL
Status: DISCONTINUED | OUTPATIENT
Start: 2020-02-02 | End: 2020-02-04 | Stop reason: HOSPADM

## 2020-02-02 RX ADMIN — CITALOPRAM HYDROBROMIDE 20 MG: 20 TABLET ORAL at 09:02

## 2020-02-02 RX ADMIN — AMITRIPTYLINE HYDROCHLORIDE 75 MG: 75 TABLET, FILM COATED ORAL at 21:02

## 2020-02-02 RX ADMIN — METOCLOPRAMIDE 10 MG: 5 INJECTION, SOLUTION INTRAMUSCULAR; INTRAVENOUS at 01:14

## 2020-02-02 RX ADMIN — METHOCARBAMOL 500 MG: 500 TABLET, FILM COATED ORAL at 09:02

## 2020-02-02 RX ADMIN — ENOXAPARIN SODIUM 40 MG: 40 INJECTION SUBCUTANEOUS at 09:01

## 2020-02-02 RX ADMIN — METOCLOPRAMIDE 10 MG: 10 TABLET ORAL at 21:02

## 2020-02-02 RX ADMIN — METHOCARBAMOL 500 MG: 500 TABLET, FILM COATED ORAL at 15:59

## 2020-02-02 RX ADMIN — MAGNESIUM SULFATE 1 G: 1 INJECTION INTRAVENOUS at 12:06

## 2020-02-02 RX ADMIN — METOCLOPRAMIDE 10 MG: 10 TABLET ORAL at 12:06

## 2020-02-02 RX ADMIN — HYDROXYZINE HYDROCHLORIDE 10 MG: 10 TABLET, FILM COATED ORAL at 18:15

## 2020-02-02 RX ADMIN — DEXTROSE MONOHYDRATE AND SODIUM CHLORIDE: 5; .45 INJECTION, SOLUTION INTRAVENOUS at 16:08

## 2020-02-02 RX ADMIN — METOCLOPRAMIDE 10 MG: 5 INJECTION, SOLUTION INTRAMUSCULAR; INTRAVENOUS at 09:02

## 2020-02-02 RX ADMIN — METOCLOPRAMIDE 10 MG: 10 TABLET ORAL at 15:59

## 2020-02-02 RX ADMIN — PANTOPRAZOLE SODIUM 40 MG: 40 INJECTION, POWDER, FOR SOLUTION INTRAVENOUS at 09:02

## 2020-02-02 RX ADMIN — DEXTROSE MONOHYDRATE AND SODIUM CHLORIDE: 5; .45 INJECTION, SOLUTION INTRAVENOUS at 03:25

## 2020-02-02 RX ADMIN — HYDROXYZINE HYDROCHLORIDE 10 MG: 10 TABLET, FILM COATED ORAL at 04:20

## 2020-02-02 RX ADMIN — HYDROXYZINE HYDROCHLORIDE 10 MG: 10 TABLET, FILM COATED ORAL at 12:06

## 2020-02-02 RX ADMIN — METHOCARBAMOL 500 MG: 500 TABLET, FILM COATED ORAL at 12:06

## 2020-02-02 RX ADMIN — METHOCARBAMOL 500 MG: 500 TABLET, FILM COATED ORAL at 19:26

## 2020-02-02 RX ADMIN — Medication: at 05:47

## 2020-02-02 RX ADMIN — POLYETHYLENE GLYCOL 3350 17 G: 17 POWDER, FOR SOLUTION ORAL at 12:06

## 2020-02-02 ASSESSMENT — ACTIVITIES OF DAILY LIVING (ADL)
ADLS_ACUITY_SCORE: 16
ADLS_ACUITY_SCORE: 18
ADLS_ACUITY_SCORE: 18
ADLS_ACUITY_SCORE: 16

## 2020-02-02 ASSESSMENT — PAIN DESCRIPTION - DESCRIPTORS
DESCRIPTORS: ACHING

## 2020-02-02 NOTE — PLAN OF CARE
VSS. Pt up with SBA. Ambulated in halls. Voids spont with adequate UOP. Tolerating full liquid diet. Pain controlled with dilaudid PCA. Old JAZZMINE sites pouched with small amount of serosang output. Pt passing gas, no BM. No insulin needed per sliding scale. Cont. POC.

## 2020-02-02 NOTE — PLAN OF CARE
VSS on RA, up with SBA to bathroom. Abd incision CDI. Pain managed with PCA dilaudid. Voiding spontaneously with adequate UOP. Tolerating clear liquid diet, denies n/v. Old JAZZMINE site has ostomy pouch d/t leaking at site. PIV infusing MIVF and PCA. Passing large amounts of gas per pt statement, no BM overnight. Continue with POC.

## 2020-02-02 NOTE — PROGRESS NOTES
Surgery Progress Note    S: No issues overnight. Tolerated clears. Denies n/v. Passing flatus. Pain controlled.    O:  Vital signs:  Temp: 98.5  F (36.9  C) Temp src: Oral BP: 133/71 Pulse: 110 Heart Rate: 110 Resp: 18 SpO2: 94 % O2 Device: None (Room air)      NAD  RRR  CTAB  Soft, NT, ND, incision c/d/i; scant leakage from drain sites      A/p:  Nathalie Bashir is a 54 year old female hx of main duct IPMN now POD#5 s/p whipple.     - Post operative pain and history of chronic pain: appreciate Pain Management recs. Available for pain is dPCA 0.4, robaxin, hydroxazine prn   - fulls  - bowel regimen  - Reglan started for promotility.  - Home meds amitriptyline and citalopram restarted   - decrease IV fluids  - Sliding scale insulin  - Lovenox for DVT prophylaxis, protonix IV    Doe Main MD PGY-5  Surgery Resident

## 2020-02-02 NOTE — PLAN OF CARE
Tachycardic, OVSS. Pt set off SIRS protocol, lactic WNL. Pt up with SBA. Ambulated in halls. Voids spont with adequate UOP. Passing gas, no BM. Tolerating clear liquid diet. Old JAZZMINE site pouched for leaking small amount of drainage. MIV infusing through PIV. Cont. POC.

## 2020-02-03 ENCOUNTER — APPOINTMENT (OUTPATIENT)
Dept: OCCUPATIONAL THERAPY | Facility: CLINIC | Age: 55
DRG: 327 | End: 2020-02-03
Attending: SURGERY
Payer: MEDICARE

## 2020-02-03 LAB
ANION GAP SERPL CALCULATED.3IONS-SCNC: 4 MMOL/L (ref 3–14)
BASOPHILS # BLD AUTO: 0 10E9/L (ref 0–0.2)
BASOPHILS NFR BLD AUTO: 0.2 %
BUN SERPL-MCNC: 3 MG/DL (ref 7–30)
CALCIUM SERPL-MCNC: 8.3 MG/DL (ref 8.5–10.1)
CHLORIDE SERPL-SCNC: 104 MMOL/L (ref 94–109)
CO2 SERPL-SCNC: 31 MMOL/L (ref 20–32)
CREAT SERPL-MCNC: 0.73 MG/DL (ref 0.52–1.04)
DIFFERENTIAL METHOD BLD: ABNORMAL
EOSINOPHIL # BLD AUTO: 0.4 10E9/L (ref 0–0.7)
EOSINOPHIL NFR BLD AUTO: 4.4 %
ERYTHROCYTE [DISTWIDTH] IN BLOOD BY AUTOMATED COUNT: 13.6 % (ref 10–15)
GFR SERPL CREATININE-BSD FRML MDRD: >90 ML/MIN/{1.73_M2}
GLUCOSE BLDC GLUCOMTR-MCNC: 101 MG/DL (ref 70–99)
GLUCOSE BLDC GLUCOMTR-MCNC: 108 MG/DL (ref 70–99)
GLUCOSE BLDC GLUCOMTR-MCNC: 109 MG/DL (ref 70–99)
GLUCOSE BLDC GLUCOMTR-MCNC: 110 MG/DL (ref 70–99)
GLUCOSE BLDC GLUCOMTR-MCNC: 150 MG/DL (ref 70–99)
GLUCOSE BLDC GLUCOMTR-MCNC: 155 MG/DL (ref 70–99)
GLUCOSE BLDC GLUCOMTR-MCNC: 95 MG/DL (ref 70–99)
GLUCOSE SERPL-MCNC: 106 MG/DL (ref 70–99)
HCT VFR BLD AUTO: 31.1 % (ref 35–47)
HGB BLD-MCNC: 10.1 G/DL (ref 11.7–15.7)
IMM GRANULOCYTES # BLD: 0.1 10E9/L (ref 0–0.4)
IMM GRANULOCYTES NFR BLD: 0.5 %
LYMPHOCYTES # BLD AUTO: 1.3 10E9/L (ref 0.8–5.3)
LYMPHOCYTES NFR BLD AUTO: 13.1 %
MAGNESIUM SERPL-MCNC: 2.1 MG/DL (ref 1.6–2.3)
MCH RBC QN AUTO: 28.5 PG (ref 26.5–33)
MCHC RBC AUTO-ENTMCNC: 32.5 G/DL (ref 31.5–36.5)
MCV RBC AUTO: 88 FL (ref 78–100)
MONOCYTES # BLD AUTO: 0.7 10E9/L (ref 0–1.3)
MONOCYTES NFR BLD AUTO: 7.5 %
NEUTROPHILS # BLD AUTO: 7.2 10E9/L (ref 1.6–8.3)
NEUTROPHILS NFR BLD AUTO: 74.3 %
NRBC # BLD AUTO: 0 10*3/UL
NRBC BLD AUTO-RTO: 0 /100
PHOSPHATE SERPL-MCNC: 3.4 MG/DL (ref 2.5–4.5)
PLATELET # BLD AUTO: 422 10E9/L (ref 150–450)
POTASSIUM SERPL-SCNC: 3.1 MMOL/L (ref 3.4–5.3)
RBC # BLD AUTO: 3.54 10E12/L (ref 3.8–5.2)
SODIUM SERPL-SCNC: 138 MMOL/L (ref 133–144)
WBC # BLD AUTO: 9.8 10E9/L (ref 4–11)

## 2020-02-03 PROCEDURE — 25000125 ZZHC RX 250: Performed by: SURGERY

## 2020-02-03 PROCEDURE — 25000132 ZZH RX MED GY IP 250 OP 250 PS 637: Mod: GY | Performed by: SURGERY

## 2020-02-03 PROCEDURE — 12000001 ZZH R&B MED SURG/OB UMMC

## 2020-02-03 PROCEDURE — 40000141 ZZH STATISTIC PERIPHERAL IV START W/O US GUIDANCE

## 2020-02-03 PROCEDURE — 25000128 H RX IP 250 OP 636: Performed by: STUDENT IN AN ORGANIZED HEALTH CARE EDUCATION/TRAINING PROGRAM

## 2020-02-03 PROCEDURE — 25000132 ZZH RX MED GY IP 250 OP 250 PS 637: Mod: GY | Performed by: STUDENT IN AN ORGANIZED HEALTH CARE EDUCATION/TRAINING PROGRAM

## 2020-02-03 PROCEDURE — 84100 ASSAY OF PHOSPHORUS: CPT | Performed by: SURGERY

## 2020-02-03 PROCEDURE — 25800025 ZZH RX 258: Performed by: SURGERY

## 2020-02-03 PROCEDURE — 97535 SELF CARE MNGMENT TRAINING: CPT | Mod: GO

## 2020-02-03 PROCEDURE — 85025 COMPLETE CBC W/AUTO DIFF WBC: CPT | Performed by: SURGERY

## 2020-02-03 PROCEDURE — 25000128 H RX IP 250 OP 636: Performed by: PHYSICIAN ASSISTANT

## 2020-02-03 PROCEDURE — 83735 ASSAY OF MAGNESIUM: CPT | Performed by: SURGERY

## 2020-02-03 PROCEDURE — 36415 COLL VENOUS BLD VENIPUNCTURE: CPT | Performed by: SURGERY

## 2020-02-03 PROCEDURE — 97530 THERAPEUTIC ACTIVITIES: CPT | Mod: GO

## 2020-02-03 PROCEDURE — 80048 BASIC METABOLIC PNL TOTAL CA: CPT | Performed by: SURGERY

## 2020-02-03 PROCEDURE — 00000146 ZZHCL STATISTIC GLUCOSE BY METER IP

## 2020-02-03 RX ORDER — LIDOCAINE HYDROCHLORIDE AND EPINEPHRINE 10; 10 MG/ML; UG/ML
20 INJECTION, SOLUTION INFILTRATION; PERINEURAL ONCE
Status: COMPLETED | OUTPATIENT
Start: 2020-02-03 | End: 2020-02-03

## 2020-02-03 RX ORDER — OXYCODONE HYDROCHLORIDE 10 MG/1
10 TABLET ORAL 4 TIMES DAILY
Status: DISCONTINUED | OUTPATIENT
Start: 2020-02-03 | End: 2020-02-04 | Stop reason: HOSPADM

## 2020-02-03 RX ORDER — POTASSIUM CHLORIDE 7.45 MG/ML
10 INJECTION INTRAVENOUS
Status: COMPLETED | OUTPATIENT
Start: 2020-02-03 | End: 2020-02-03

## 2020-02-03 RX ORDER — HYDROXYZINE HYDROCHLORIDE 10 MG/1
10-50 TABLET, FILM COATED ORAL 3 TIMES DAILY PRN
Status: DISCONTINUED | OUTPATIENT
Start: 2020-02-03 | End: 2020-02-04 | Stop reason: HOSPADM

## 2020-02-03 RX ORDER — HYDROMORPHONE HYDROCHLORIDE 1 MG/ML
0.3 INJECTION, SOLUTION INTRAMUSCULAR; INTRAVENOUS; SUBCUTANEOUS
Status: DISCONTINUED | OUTPATIENT
Start: 2020-02-03 | End: 2020-02-04

## 2020-02-03 RX ADMIN — METOCLOPRAMIDE 10 MG: 10 TABLET ORAL at 16:13

## 2020-02-03 RX ADMIN — METOCLOPRAMIDE 10 MG: 10 TABLET ORAL at 08:18

## 2020-02-03 RX ADMIN — HYDROXYZINE HYDROCHLORIDE 20 MG: 10 TABLET, FILM COATED ORAL at 15:40

## 2020-02-03 RX ADMIN — CITALOPRAM HYDROBROMIDE 20 MG: 20 TABLET ORAL at 08:18

## 2020-02-03 RX ADMIN — METOCLOPRAMIDE 10 MG: 10 TABLET ORAL at 12:03

## 2020-02-03 RX ADMIN — POTASSIUM CHLORIDE 10 MEQ: 7.46 INJECTION, SOLUTION INTRAVENOUS at 11:06

## 2020-02-03 RX ADMIN — METHOCARBAMOL 500 MG: 500 TABLET, FILM COATED ORAL at 08:18

## 2020-02-03 RX ADMIN — POTASSIUM CHLORIDE 10 MEQ: 7.46 INJECTION, SOLUTION INTRAVENOUS at 14:47

## 2020-02-03 RX ADMIN — METHOCARBAMOL 500 MG: 500 TABLET, FILM COATED ORAL at 16:13

## 2020-02-03 RX ADMIN — INSULIN ASPART 1 UNITS: 100 INJECTION, SOLUTION INTRAVENOUS; SUBCUTANEOUS at 16:25

## 2020-02-03 RX ADMIN — OXYCODONE HYDROCHLORIDE 10 MG: 10 TABLET ORAL at 12:03

## 2020-02-03 RX ADMIN — LIDOCAINE HYDROCHLORIDE,EPINEPHRINE BITARTRATE 20 ML: 10; .01 INJECTION, SOLUTION INFILTRATION; PERINEURAL at 15:38

## 2020-02-03 RX ADMIN — METHOCARBAMOL 500 MG: 500 TABLET, FILM COATED ORAL at 12:03

## 2020-02-03 RX ADMIN — ENOXAPARIN SODIUM 40 MG: 40 INJECTION SUBCUTANEOUS at 11:06

## 2020-02-03 RX ADMIN — HYDROXYZINE HYDROCHLORIDE 10 MG: 10 TABLET, FILM COATED ORAL at 00:12

## 2020-02-03 RX ADMIN — HYDROXYZINE HYDROCHLORIDE 10 MG: 10 TABLET, FILM COATED ORAL at 10:10

## 2020-02-03 RX ADMIN — INSULIN ASPART 1 UNITS: 100 INJECTION, SOLUTION INTRAVENOUS; SUBCUTANEOUS at 20:12

## 2020-02-03 RX ADMIN — DEXTROSE MONOHYDRATE AND SODIUM CHLORIDE: 5; .45 INJECTION, SOLUTION INTRAVENOUS at 05:14

## 2020-02-03 RX ADMIN — POTASSIUM CHLORIDE 10 MEQ: 7.46 INJECTION, SOLUTION INTRAVENOUS at 09:59

## 2020-02-03 RX ADMIN — OXYCODONE HYDROCHLORIDE 10 MG: 10 TABLET ORAL at 20:12

## 2020-02-03 RX ADMIN — OXYCODONE HYDROCHLORIDE 10 MG: 10 TABLET ORAL at 16:13

## 2020-02-03 RX ADMIN — POTASSIUM CHLORIDE 10 MEQ: 7.46 INJECTION, SOLUTION INTRAVENOUS at 13:32

## 2020-02-03 RX ADMIN — METHOCARBAMOL 500 MG: 500 TABLET, FILM COATED ORAL at 20:12

## 2020-02-03 RX ADMIN — OXYCODONE HYDROCHLORIDE 10 MG: 10 TABLET ORAL at 08:18

## 2020-02-03 RX ADMIN — PANTOPRAZOLE SODIUM 40 MG: 40 TABLET, DELAYED RELEASE ORAL at 08:18

## 2020-02-03 RX ADMIN — AMITRIPTYLINE HYDROCHLORIDE 75 MG: 75 TABLET, FILM COATED ORAL at 21:40

## 2020-02-03 RX ADMIN — HYDROXYZINE HYDROCHLORIDE 20 MG: 10 TABLET, FILM COATED ORAL at 21:40

## 2020-02-03 ASSESSMENT — ACTIVITIES OF DAILY LIVING (ADL)
ADLS_ACUITY_SCORE: 16
ADLS_ACUITY_SCORE: 17
ADLS_ACUITY_SCORE: 17
ADLS_ACUITY_SCORE: 16
ADLS_ACUITY_SCORE: 16
ADLS_ACUITY_SCORE: 17

## 2020-02-03 ASSESSMENT — PAIN DESCRIPTION - DESCRIPTORS
DESCRIPTORS: ACHING

## 2020-02-03 ASSESSMENT — MIFFLIN-ST. JEOR: SCORE: 1554.67

## 2020-02-03 NOTE — PLAN OF CARE
POD 6 s/p whipple. Slightly tachycardic, OVSS on RA. Abd incision rebecca CDI and YOSEF. Abd pain managed with PCA diluadid. Ostomy pouch covering old JAZZMINE site collecting serosanguinous drainage at site. BS+, passing flatus and LBM yesterday evening. Ambulated with SBA to bathroom, voiding spontaneously with adequate UOP. Tolerating full liquid diet, denies n/v. Q4hr BG checks, no insulin coverage needed. PIV infusing MIVF and PCA.  Plan is to hopefully switch to oral pain meds and advance to low fiber diet today. Continue with POC.

## 2020-02-03 NOTE — PROGRESS NOTES
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  She is recovering well from her Whipple.  I discussed with her that the plan of action will be to get her recovered from her Whipple, stage her with PET/CT and EGD/EUS, give her chemotherapy, and potentially get her back for curative intent surgery provided her cancer is amenable to that treatment.  All questions answered and the patient was in agreement with and understanding of the plan.

## 2020-02-03 NOTE — PLAN OF CARE
OT/7D:   Discharge Planner OT   Patient plan for discharge: Home with assist.   Current status: Facilitating shower and return to ADL AM routine. Completes UB/LB washing while seated/standing with SBA and cues for adherence to precautions/incision safety. Mild forgetfulness noted at times. Patient demonstrates good hip flexibility for figure four technique with washing LE's and completing LB dressing. Reinforcing importance of log roll technique to maintain abdominal precautions - demonstrates with supervision and fair-good technique. Ambulates to therapy gym with cane/IV pole and SBA-CGA. Continues to demonstrate mild balance instability, especially with fatigue. SBA to complete tub/shower transfer.   Barriers to return to prior living situation: Medical Status, Post-op Precautions  Recommendations for discharge: Home with assist from family for heavy ADL/IADL.  Defer to PT for OP PT needs for higher level balance.   Rationale for recommendations: Patient completing basic ADL tasks SBA. Has good family support; recommended equipment at home (shower chair, reacher). Will require assist for heavy ADL/IADL to maintain precautions.        Entered by: Coretta Montanez 02/03/2020 2:26 PM

## 2020-02-03 NOTE — PLAN OF CARE
Writing RN assumed care from [id number]. Tachycardic with HR in the low 100's otherwise AVSS. Pt reports abdominal and back pain, given 10 mg of scheduled oxycodone QID, robaxin, atarax and heat pack with relief. Denies nausea. On scheduled reglan and pt was advanced to a post gastrectomy diet. So far tolerating diet well. Passing flatus and last BM was this evening. Voiding adequate amounts. Abdominal incision with a small amount of erythema and staples. MD's aware of erythema. K replaced per orders. Bg checked q 4 hours and sliding scale insulin given. Up ab nelida, just needs help with cord management. Worked with PT today. Spoke with Dr. Mallory regarding new phlebitis in right arm after PIV removal. Ordered to follow infiltration policy and apply ice. Redness was marked. Pt received path report for Dr. Mallory today. See provider note. Pt has been appropriately tearful and anxious, emotional support provided. Continue to monitor HR, pain, diet toleration and PIV site.

## 2020-02-03 NOTE — PLAN OF CARE
Remains tachycardic 110s, OVSS on RA. A+OX4. Pain controlled with scheduled robaxin, prn atarax, PCA Dilaudid 0.4mg bumps q10min. Denies nausea. Full liquid diet with good intake. ML incision stapled c/d/I. Old JAZZMINE site pouched with serosanguinous output. Voiding good amounts. Passing gas, had two loose BMs this shift. UAL, steady on feet.     Plan: advanced to low fiber tomorrow and discontinue PCA. Increase activity as tolerated and prepare for discharge home in 1-2 days.

## 2020-02-03 NOTE — PROGRESS NOTES
Spoke with Dr. Mallory regarding new phlebitis in right arm after PIV removal. Ordered to follow infiltration policy and apply ice, redness was marked and photo will be taken with extravasation kit. Continue to monitor right arm.

## 2020-02-03 NOTE — PROGRESS NOTES
Surgery Progress Note    S:  No acute events overnight. Pt was lying comfortably in bed. Reports no nausea/vomiting, CP, SOB, or fever. Tolerated pudding yesterday and has good appetite. Reports voiding and first BM. Pain is well-controlled.    O:  Temp:  [97.9  F (36.6  C)-98.5  F (36.9  C)] 98.2  F (36.8  C)  Heart Rate:  [102-111] 102  Resp:  [16] 16  BP: (117-128)/(59-73) 120/63  SpO2:  [93 %-96 %] 96 %    I/O last 3 completed shifts:  In: 3450 [P.O.:1650; I.V.:1800]  Out: 5840 [Urine:5550; Drains:290]    UOP: 4400 / 1250    Gen: A&Ox3, NAD  Resp: breathing comfortably on RA with no increased work of breathing  Abd: Soft, Non-distended, Non-tender, No rebound or guarding.  Ext: warm and well perfused  Incisions: non-tender, non-erythematous    Labs:  Complete Blood Count   Recent Labs   Lab 02/02/20  0802 02/01/20  0727 01/31/20  0719 01/30/20  0711   WBC 9.8 13.1* 17.2* 21.3*   HGB 9.8* 10.2* 10.6* 11.1*    328 269 297     Basic Metabolic Panel  Recent Labs   Lab 02/02/20  0802 02/01/20  0727 01/31/20  0719 01/30/20  0711    138 135 138   POTASSIUM 4.0 3.3* 3.4 3.7   CHLORIDE 105 106 105 107   CO2 30 27 25 26   BUN 2* 3* 5* 8   CR 0.74 0.64 0.63 0.66   * 128* 110* 121*     Liver Function Tests  Recent Labs   Lab 01/30/20  0711 01/29/20  0701 01/29/20  0654 01/28/20  1545   AST 36 35  --  47*   ALT 28 33  --  39   ALKPHOS 108 92  --  102   BILITOTAL 0.6 0.4  --  0.5   ALBUMIN 2.4* 2.8*  --  2.9*   INR  --   --  1.28* 1.04     Pancreatic Enzymes  Recent Labs   Lab 01/31/20  0719 01/29/20  0701   AMYLASE 13* 111*     Coagulation Profile  Recent Labs   Lab 01/29/20  0654 01/28/20  1545   INR 1.28* 1.04          A/P: Nathalie Bashir is a 54 year old female with main duct IPNM POD# 6 s/p open whipple doing well.    - Post operative pain and history of chronic pain: appreciate Pain Management recs. Transition IV to PO oxycodone, robaxin & hydroxazine prn   - post-gastrectomy diet  - bowel regimen  -  continue Reglan for promotility.  - continue restarted PTA amitriptyline and citalopram  - Sliding scale insulin  - Lovenox for DVT prophylaxis, protonix IV    Andrea Marquez, MS-3    I, Abdi Rodriguez MD, agree with the above documentation by student doctor Jimmy and have made any necessary edits to the note.    - POD6 from Whipple for main duct IPMN  - Passing flatus, having BMs  - Post-gastrectomy diet  - Transition to PO pain medication, PRN IV dilaudid for breakthrough  - Reglan  - PTA meds  - DVT and PPI ppx  - Anticipate discharge tomorrow    Abdi Rodriguez, PGY-3  General Surgery

## 2020-02-04 ENCOUNTER — APPOINTMENT (OUTPATIENT)
Dept: CT IMAGING | Facility: CLINIC | Age: 55
DRG: 920 | End: 2020-02-04
Attending: EMERGENCY MEDICINE
Payer: MEDICARE

## 2020-02-04 ENCOUNTER — TELEPHONE (OUTPATIENT)
Dept: SURGERY | Facility: CLINIC | Age: 55
End: 2020-02-04

## 2020-02-04 ENCOUNTER — APPOINTMENT (OUTPATIENT)
Dept: GENERAL RADIOLOGY | Facility: CLINIC | Age: 55
DRG: 920 | End: 2020-02-04
Attending: EMERGENCY MEDICINE
Payer: MEDICARE

## 2020-02-04 ENCOUNTER — HOSPITAL ENCOUNTER (INPATIENT)
Facility: CLINIC | Age: 55
LOS: 11 days | Discharge: HOME | DRG: 920 | End: 2020-02-15
Attending: EMERGENCY MEDICINE | Admitting: SURGERY
Payer: MEDICARE

## 2020-02-04 VITALS
TEMPERATURE: 98.4 F | WEIGHT: 198.7 LBS | HEART RATE: 87 BPM | SYSTOLIC BLOOD PRESSURE: 127 MMHG | HEIGHT: 68 IN | DIASTOLIC BLOOD PRESSURE: 60 MMHG | OXYGEN SATURATION: 97 % | BODY MASS INDEX: 30.11 KG/M2 | RESPIRATION RATE: 16 BRPM

## 2020-02-04 DIAGNOSIS — K65.1 POSTPROCEDURAL INTRAABDOMINAL ABSCESS (H): ICD-10-CM

## 2020-02-04 DIAGNOSIS — R07.9 RIGHT-SIDED CHEST PAIN: ICD-10-CM

## 2020-02-04 DIAGNOSIS — R10.11 RUQ ABDOMINAL PAIN: ICD-10-CM

## 2020-02-04 DIAGNOSIS — R18.8 INTRAABDOMINAL FLUID COLLECTION: Primary | ICD-10-CM

## 2020-02-04 DIAGNOSIS — D72.829 LEUKOCYTOSIS, UNSPECIFIED TYPE: ICD-10-CM

## 2020-02-04 DIAGNOSIS — T81.43XA POSTPROCEDURAL INTRAABDOMINAL ABSCESS (H): ICD-10-CM

## 2020-02-04 DIAGNOSIS — Z90.411 HISTORY OF PARTIAL PANCREATECTOMY: ICD-10-CM

## 2020-02-04 LAB
ALBUMIN SERPL-MCNC: 2.6 G/DL (ref 3.4–5)
ALBUMIN UR-MCNC: NEGATIVE MG/DL
ALP SERPL-CCNC: 283 U/L (ref 40–150)
ALT SERPL W P-5'-P-CCNC: 31 U/L (ref 0–50)
ANION GAP SERPL CALCULATED.3IONS-SCNC: 3 MMOL/L (ref 3–14)
ANION GAP SERPL CALCULATED.3IONS-SCNC: 7 MMOL/L (ref 3–14)
APPEARANCE UR: CLEAR
AST SERPL W P-5'-P-CCNC: 29 U/L (ref 0–45)
BASOPHILS # BLD AUTO: 0 10E9/L (ref 0–0.2)
BASOPHILS # BLD AUTO: 0 10E9/L (ref 0–0.2)
BASOPHILS NFR BLD AUTO: 0.2 %
BASOPHILS NFR BLD AUTO: 0.2 %
BILIRUB SERPL-MCNC: 0.5 MG/DL (ref 0.2–1.3)
BILIRUB UR QL STRIP: NEGATIVE
BUN SERPL-MCNC: 4 MG/DL (ref 7–30)
BUN SERPL-MCNC: 5 MG/DL (ref 7–30)
CALCIUM SERPL-MCNC: 8.4 MG/DL (ref 8.5–10.1)
CALCIUM SERPL-MCNC: 8.5 MG/DL (ref 8.5–10.1)
CHLORIDE SERPL-SCNC: 106 MMOL/L (ref 94–109)
CHLORIDE SERPL-SCNC: 99 MMOL/L (ref 94–109)
CO2 SERPL-SCNC: 27 MMOL/L (ref 20–32)
CO2 SERPL-SCNC: 31 MMOL/L (ref 20–32)
COLOR UR AUTO: YELLOW
COPATH REPORT: NORMAL
CREAT SERPL-MCNC: 0.76 MG/DL (ref 0.52–1.04)
CREAT SERPL-MCNC: 0.79 MG/DL (ref 0.52–1.04)
DIFFERENTIAL METHOD BLD: ABNORMAL
DIFFERENTIAL METHOD BLD: ABNORMAL
EOSINOPHIL # BLD AUTO: 0 10E9/L (ref 0–0.7)
EOSINOPHIL # BLD AUTO: 0.3 10E9/L (ref 0–0.7)
EOSINOPHIL NFR BLD AUTO: 0.2 %
EOSINOPHIL NFR BLD AUTO: 3.1 %
ERYTHROCYTE [DISTWIDTH] IN BLOOD BY AUTOMATED COUNT: 13.8 % (ref 10–15)
ERYTHROCYTE [DISTWIDTH] IN BLOOD BY AUTOMATED COUNT: 13.9 % (ref 10–15)
FLUAV+FLUBV AG SPEC QL: NEGATIVE
FLUAV+FLUBV AG SPEC QL: NEGATIVE
GFR SERPL CREATININE-BSD FRML MDRD: 85 ML/MIN/{1.73_M2}
GFR SERPL CREATININE-BSD FRML MDRD: 89 ML/MIN/{1.73_M2}
GLUCOSE BLDC GLUCOMTR-MCNC: 93 MG/DL (ref 70–99)
GLUCOSE BLDC GLUCOMTR-MCNC: 97 MG/DL (ref 70–99)
GLUCOSE SERPL-MCNC: 146 MG/DL (ref 70–99)
GLUCOSE SERPL-MCNC: 99 MG/DL (ref 70–99)
GLUCOSE UR STRIP-MCNC: NEGATIVE MG/DL
HCT VFR BLD AUTO: 30.3 % (ref 35–47)
HCT VFR BLD AUTO: 33.9 % (ref 35–47)
HGB BLD-MCNC: 11.2 G/DL (ref 11.7–15.7)
HGB BLD-MCNC: 9.8 G/DL (ref 11.7–15.7)
HGB UR QL STRIP: NEGATIVE
IMM GRANULOCYTES # BLD: 0.1 10E9/L (ref 0–0.4)
IMM GRANULOCYTES # BLD: 0.2 10E9/L (ref 0–0.4)
IMM GRANULOCYTES NFR BLD: 0.7 %
IMM GRANULOCYTES NFR BLD: 1 %
INTERPRETATION ECG - MUSE: NORMAL
KETONES UR STRIP-MCNC: 40 MG/DL
LACTATE BLD-SCNC: 0.8 MMOL/L (ref 0.7–2)
LACTATE BLD-SCNC: 0.8 MMOL/L (ref 0.7–2)
LEUKOCYTE ESTERASE UR QL STRIP: NEGATIVE
LIPASE SERPL-CCNC: 37 U/L (ref 73–393)
LYMPHOCYTES # BLD AUTO: 1.1 10E9/L (ref 0.8–5.3)
LYMPHOCYTES # BLD AUTO: 1.2 10E9/L (ref 0.8–5.3)
LYMPHOCYTES NFR BLD AUTO: 11.2 %
LYMPHOCYTES NFR BLD AUTO: 6.1 %
MAGNESIUM SERPL-MCNC: 2.1 MG/DL (ref 1.6–2.3)
MCH RBC QN AUTO: 28.5 PG (ref 26.5–33)
MCH RBC QN AUTO: 28.8 PG (ref 26.5–33)
MCHC RBC AUTO-ENTMCNC: 32.3 G/DL (ref 31.5–36.5)
MCHC RBC AUTO-ENTMCNC: 33 G/DL (ref 31.5–36.5)
MCV RBC AUTO: 86 FL (ref 78–100)
MCV RBC AUTO: 89 FL (ref 78–100)
MONOCYTES # BLD AUTO: 0.8 10E9/L (ref 0–1.3)
MONOCYTES # BLD AUTO: 0.9 10E9/L (ref 0–1.3)
MONOCYTES NFR BLD AUTO: 4.8 %
MONOCYTES NFR BLD AUTO: 7.7 %
MUCOUS THREADS #/AREA URNS LPF: PRESENT /LPF
NEUTROPHILS # BLD AUTO: 15.9 10E9/L (ref 1.6–8.3)
NEUTROPHILS # BLD AUTO: 8.3 10E9/L (ref 1.6–8.3)
NEUTROPHILS NFR BLD AUTO: 77.1 %
NEUTROPHILS NFR BLD AUTO: 87.7 %
NITRATE UR QL: NEGATIVE
NRBC # BLD AUTO: 0 10*3/UL
NRBC # BLD AUTO: 0 10*3/UL
NRBC BLD AUTO-RTO: 0 /100
NRBC BLD AUTO-RTO: 0 /100
PH UR STRIP: 7 PH (ref 5–7)
PHOSPHATE SERPL-MCNC: 3.7 MG/DL (ref 2.5–4.5)
PLATELET # BLD AUTO: 432 10E9/L (ref 150–450)
PLATELET # BLD AUTO: 533 10E9/L (ref 150–450)
PLATELET # BLD EST: ABNORMAL 10*3/UL
POTASSIUM SERPL-SCNC: 3.2 MMOL/L (ref 3.4–5.3)
POTASSIUM SERPL-SCNC: 3.4 MMOL/L (ref 3.4–5.3)
PROT SERPL-MCNC: 6.5 G/DL (ref 6.8–8.8)
RBC # BLD AUTO: 3.4 10E12/L (ref 3.8–5.2)
RBC # BLD AUTO: 3.93 10E12/L (ref 3.8–5.2)
RBC #/AREA URNS AUTO: 1 /HPF (ref 0–2)
SODIUM SERPL-SCNC: 133 MMOL/L (ref 133–144)
SODIUM SERPL-SCNC: 140 MMOL/L (ref 133–144)
SOURCE: ABNORMAL
SP GR UR STRIP: 1.02 (ref 1–1.03)
SPECIMEN SOURCE: NORMAL
SQUAMOUS #/AREA URNS AUTO: 1 /HPF (ref 0–1)
TROPONIN I SERPL-MCNC: <0.015 UG/L (ref 0–0.04)
UROBILINOGEN UR STRIP-MCNC: 2 MG/DL (ref 0–2)
WBC # BLD AUTO: 10.8 10E9/L (ref 4–11)
WBC # BLD AUTO: 18.2 10E9/L (ref 4–11)
WBC #/AREA URNS AUTO: 2 /HPF (ref 0–5)

## 2020-02-04 PROCEDURE — 25000132 ZZH RX MED GY IP 250 OP 250 PS 637: Mod: GY | Performed by: SURGERY

## 2020-02-04 PROCEDURE — 71046 X-RAY EXAM CHEST 2 VIEWS: CPT

## 2020-02-04 PROCEDURE — 99285 EMERGENCY DEPT VISIT HI MDM: CPT | Mod: 25 | Performed by: EMERGENCY MEDICINE

## 2020-02-04 PROCEDURE — 87804 INFLUENZA ASSAY W/OPTIC: CPT | Performed by: EMERGENCY MEDICINE

## 2020-02-04 PROCEDURE — 80053 COMPREHEN METABOLIC PANEL: CPT | Performed by: EMERGENCY MEDICINE

## 2020-02-04 PROCEDURE — 25000132 ZZH RX MED GY IP 250 OP 250 PS 637: Mod: GY | Performed by: STUDENT IN AN ORGANIZED HEALTH CARE EDUCATION/TRAINING PROGRAM

## 2020-02-04 PROCEDURE — 71275 CT ANGIOGRAPHY CHEST: CPT

## 2020-02-04 PROCEDURE — 87186 SC STD MICRODIL/AGAR DIL: CPT | Performed by: EMERGENCY MEDICINE

## 2020-02-04 PROCEDURE — 83605 ASSAY OF LACTIC ACID: CPT | Performed by: EMERGENCY MEDICINE

## 2020-02-04 PROCEDURE — 96365 THER/PROPH/DIAG IV INF INIT: CPT

## 2020-02-04 PROCEDURE — 25000128 H RX IP 250 OP 636: Performed by: PHYSICIAN ASSISTANT

## 2020-02-04 PROCEDURE — 87040 BLOOD CULTURE FOR BACTERIA: CPT | Performed by: EMERGENCY MEDICINE

## 2020-02-04 PROCEDURE — 85025 COMPLETE CBC W/AUTO DIFF WBC: CPT | Performed by: EMERGENCY MEDICINE

## 2020-02-04 PROCEDURE — 80048 BASIC METABOLIC PNL TOTAL CA: CPT | Performed by: SURGERY

## 2020-02-04 PROCEDURE — 87077 CULTURE AEROBIC IDENTIFY: CPT | Performed by: EMERGENCY MEDICINE

## 2020-02-04 PROCEDURE — 25000128 H RX IP 250 OP 636: Performed by: STUDENT IN AN ORGANIZED HEALTH CARE EDUCATION/TRAINING PROGRAM

## 2020-02-04 PROCEDURE — 25000128 H RX IP 250 OP 636: Performed by: EMERGENCY MEDICINE

## 2020-02-04 PROCEDURE — 83690 ASSAY OF LIPASE: CPT | Performed by: EMERGENCY MEDICINE

## 2020-02-04 PROCEDURE — 84100 ASSAY OF PHOSPHORUS: CPT | Performed by: SURGERY

## 2020-02-04 PROCEDURE — 85025 COMPLETE CBC W/AUTO DIFF WBC: CPT | Performed by: SURGERY

## 2020-02-04 PROCEDURE — 74177 CT ABD & PELVIS W/CONTRAST: CPT

## 2020-02-04 PROCEDURE — 00000146 ZZHCL STATISTIC GLUCOSE BY METER IP

## 2020-02-04 PROCEDURE — 96361 HYDRATE IV INFUSION ADD-ON: CPT

## 2020-02-04 PROCEDURE — 84484 ASSAY OF TROPONIN QUANT: CPT | Performed by: EMERGENCY MEDICINE

## 2020-02-04 PROCEDURE — 25800030 ZZH RX IP 258 OP 636: Performed by: EMERGENCY MEDICINE

## 2020-02-04 PROCEDURE — 99285 EMERGENCY DEPT VISIT HI MDM: CPT | Mod: 25

## 2020-02-04 PROCEDURE — 36415 COLL VENOUS BLD VENIPUNCTURE: CPT | Performed by: SURGERY

## 2020-02-04 PROCEDURE — 93005 ELECTROCARDIOGRAM TRACING: CPT

## 2020-02-04 PROCEDURE — 96376 TX/PRO/DX INJ SAME DRUG ADON: CPT

## 2020-02-04 PROCEDURE — 93010 ELECTROCARDIOGRAM REPORT: CPT | Mod: Z6 | Performed by: EMERGENCY MEDICINE

## 2020-02-04 PROCEDURE — 12000001 ZZH R&B MED SURG/OB UMMC

## 2020-02-04 PROCEDURE — 83735 ASSAY OF MAGNESIUM: CPT | Performed by: SURGERY

## 2020-02-04 PROCEDURE — 96375 TX/PRO/DX INJ NEW DRUG ADDON: CPT

## 2020-02-04 PROCEDURE — 87800 DETECT AGNT MULT DNA DIREC: CPT | Performed by: EMERGENCY MEDICINE

## 2020-02-04 PROCEDURE — 81001 URINALYSIS AUTO W/SCOPE: CPT | Performed by: EMERGENCY MEDICINE

## 2020-02-04 RX ORDER — LEVOFLOXACIN 5 MG/ML
750 INJECTION, SOLUTION INTRAVENOUS EVERY 24 HOURS
Status: DISCONTINUED | OUTPATIENT
Start: 2020-02-05 | End: 2020-02-05

## 2020-02-04 RX ORDER — METOCLOPRAMIDE 10 MG/1
10 TABLET ORAL
Qty: 120 TABLET | Refills: 0 | Status: SHIPPED | OUTPATIENT
Start: 2020-02-04 | End: 2020-03-31

## 2020-02-04 RX ORDER — CITALOPRAM HYDROBROMIDE 20 MG/1
20 TABLET ORAL EVERY MORNING
Status: DISCONTINUED | OUTPATIENT
Start: 2020-02-05 | End: 2020-02-15 | Stop reason: HOSPADM

## 2020-02-04 RX ORDER — AMOXICILLIN 250 MG
1 CAPSULE ORAL AT BEDTIME
Status: DISCONTINUED | OUTPATIENT
Start: 2020-02-04 | End: 2020-02-10

## 2020-02-04 RX ORDER — PIPERACILLIN SODIUM, TAZOBACTAM SODIUM 3; .375 G/15ML; G/15ML
3.38 INJECTION, POWDER, LYOPHILIZED, FOR SOLUTION INTRAVENOUS ONCE
Status: COMPLETED | OUTPATIENT
Start: 2020-02-04 | End: 2020-02-04

## 2020-02-04 RX ORDER — HYDROMORPHONE HCL/0.9% NACL/PF 0.2MG/0.2
.2-.4 SYRINGE (ML) INTRAVENOUS
Status: DISCONTINUED | OUTPATIENT
Start: 2020-02-04 | End: 2020-02-05

## 2020-02-04 RX ORDER — POTASSIUM CHLORIDE 750 MG/1
40 TABLET, EXTENDED RELEASE ORAL ONCE
Status: COMPLETED | OUTPATIENT
Start: 2020-02-04 | End: 2020-02-04

## 2020-02-04 RX ORDER — AMITRIPTYLINE HYDROCHLORIDE 75 MG/1
75 TABLET ORAL AT BEDTIME
Status: DISCONTINUED | OUTPATIENT
Start: 2020-02-04 | End: 2020-02-15 | Stop reason: HOSPADM

## 2020-02-04 RX ORDER — OXYCODONE HYDROCHLORIDE 10 MG/1
10 TABLET ORAL EVERY 6 HOURS
Status: DISCONTINUED | OUTPATIENT
Start: 2020-02-04 | End: 2020-02-05

## 2020-02-04 RX ORDER — POLYETHYLENE GLYCOL 3350 17 G/17G
17 POWDER, FOR SOLUTION ORAL DAILY
Qty: 30 PACKET | Refills: 0 | Status: SHIPPED | OUTPATIENT
Start: 2020-02-04 | End: 2020-04-13

## 2020-02-04 RX ORDER — POLYETHYLENE GLYCOL 3350 17 G/17G
17 POWDER, FOR SOLUTION ORAL DAILY
Status: DISCONTINUED | OUTPATIENT
Start: 2020-02-05 | End: 2020-02-10

## 2020-02-04 RX ORDER — SODIUM CHLORIDE 9 MG/ML
1000 INJECTION, SOLUTION INTRAVENOUS CONTINUOUS
Status: DISCONTINUED | OUTPATIENT
Start: 2020-02-04 | End: 2020-02-04

## 2020-02-04 RX ORDER — ONDANSETRON 2 MG/ML
4 INJECTION INTRAMUSCULAR; INTRAVENOUS EVERY 30 MIN PRN
Status: DISCONTINUED | OUTPATIENT
Start: 2020-02-04 | End: 2020-02-04

## 2020-02-04 RX ORDER — IOPAMIDOL 755 MG/ML
122 INJECTION, SOLUTION INTRAVASCULAR ONCE
Status: COMPLETED | OUTPATIENT
Start: 2020-02-04 | End: 2020-02-04

## 2020-02-04 RX ORDER — LIDOCAINE 40 MG/G
CREAM TOPICAL
Status: DISCONTINUED | OUTPATIENT
Start: 2020-02-04 | End: 2020-02-15 | Stop reason: HOSPADM

## 2020-02-04 RX ORDER — HYDROXYZINE HYDROCHLORIDE 10 MG/1
10 TABLET, FILM COATED ORAL 3 TIMES DAILY PRN
Status: DISCONTINUED | OUTPATIENT
Start: 2020-02-04 | End: 2020-02-05

## 2020-02-04 RX ORDER — FLUTICASONE PROPIONATE 50 MCG
2 SPRAY, SUSPENSION (ML) NASAL DAILY PRN
Status: DISCONTINUED | OUTPATIENT
Start: 2020-02-04 | End: 2020-02-15 | Stop reason: HOSPADM

## 2020-02-04 RX ORDER — PANTOPRAZOLE SODIUM 40 MG/1
40 TABLET, DELAYED RELEASE ORAL
Qty: 90 TABLET | Refills: 0 | Status: SHIPPED | OUTPATIENT
Start: 2020-02-04 | End: 2020-04-07

## 2020-02-04 RX ORDER — METOCLOPRAMIDE 10 MG/1
10 TABLET ORAL
Status: DISCONTINUED | OUTPATIENT
Start: 2020-02-04 | End: 2020-02-15 | Stop reason: HOSPADM

## 2020-02-04 RX ORDER — ONDANSETRON 2 MG/ML
4 INJECTION INTRAMUSCULAR; INTRAVENOUS EVERY 6 HOURS PRN
Status: DISCONTINUED | OUTPATIENT
Start: 2020-02-04 | End: 2020-02-15 | Stop reason: HOSPADM

## 2020-02-04 RX ORDER — HYDROMORPHONE HYDROCHLORIDE 1 MG/ML
0.5 INJECTION, SOLUTION INTRAMUSCULAR; INTRAVENOUS; SUBCUTANEOUS
Status: COMPLETED | OUTPATIENT
Start: 2020-02-04 | End: 2020-02-04

## 2020-02-04 RX ORDER — PANTOPRAZOLE SODIUM 40 MG/1
40 TABLET, DELAYED RELEASE ORAL
Status: DISCONTINUED | OUTPATIENT
Start: 2020-02-05 | End: 2020-02-15 | Stop reason: HOSPADM

## 2020-02-04 RX ORDER — METHOCARBAMOL 500 MG/1
500 TABLET, FILM COATED ORAL 4 TIMES DAILY
Qty: 40 TABLET | Refills: 0 | Status: ON HOLD | OUTPATIENT
Start: 2020-02-04 | End: 2020-03-26

## 2020-02-04 RX ORDER — METHOCARBAMOL 500 MG/1
500 TABLET, FILM COATED ORAL 4 TIMES DAILY
Status: DISCONTINUED | OUTPATIENT
Start: 2020-02-05 | End: 2020-02-05

## 2020-02-04 RX ORDER — OXYCODONE HYDROCHLORIDE 10 MG/1
10 TABLET ORAL 4 TIMES DAILY
Status: DISCONTINUED | OUTPATIENT
Start: 2020-02-05 | End: 2020-02-04

## 2020-02-04 RX ORDER — AMOXICILLIN 250 MG
1 CAPSULE ORAL AT BEDTIME
Qty: 14 TABLET | Refills: 0 | Status: ON HOLD | OUTPATIENT
Start: 2020-02-04 | End: 2020-05-23

## 2020-02-04 RX ORDER — NALOXONE HYDROCHLORIDE 0.4 MG/ML
.1-.4 INJECTION, SOLUTION INTRAMUSCULAR; INTRAVENOUS; SUBCUTANEOUS
Status: DISCONTINUED | OUTPATIENT
Start: 2020-02-04 | End: 2020-02-15 | Stop reason: HOSPADM

## 2020-02-04 RX ADMIN — METHOCARBAMOL 500 MG: 500 TABLET, FILM COATED ORAL at 07:36

## 2020-02-04 RX ADMIN — SENNOSIDES AND DOCUSATE SODIUM 1 TABLET: 8.6; 5 TABLET ORAL at 23:45

## 2020-02-04 RX ADMIN — Medication 0.4 MG: at 22:34

## 2020-02-04 RX ADMIN — SODIUM CHLORIDE 1000 ML: 9 INJECTION, SOLUTION INTRAVENOUS at 16:45

## 2020-02-04 RX ADMIN — PANTOPRAZOLE SODIUM 40 MG: 40 TABLET, DELAYED RELEASE ORAL at 07:36

## 2020-02-04 RX ADMIN — HYDROMORPHONE HYDROCHLORIDE 0.5 MG: 1 INJECTION, SOLUTION INTRAMUSCULAR; INTRAVENOUS; SUBCUTANEOUS at 17:02

## 2020-02-04 RX ADMIN — AMITRIPTYLINE HYDROCHLORIDE 75 MG: 75 TABLET, FILM COATED ORAL at 23:46

## 2020-02-04 RX ADMIN — HYDROXYZINE HYDROCHLORIDE 20 MG: 10 TABLET, FILM COATED ORAL at 08:25

## 2020-02-04 RX ADMIN — POTASSIUM CHLORIDE 40 MEQ: 750 TABLET, EXTENDED RELEASE ORAL at 09:14

## 2020-02-04 RX ADMIN — HYDROMORPHONE HYDROCHLORIDE 0.3 MG: 1 INJECTION, SOLUTION INTRAMUSCULAR; INTRAVENOUS; SUBCUTANEOUS at 00:07

## 2020-02-04 RX ADMIN — ONDANSETRON 4 MG: 2 INJECTION INTRAMUSCULAR; INTRAVENOUS at 17:02

## 2020-02-04 RX ADMIN — METHOCARBAMOL 500 MG: 500 TABLET, FILM COATED ORAL at 11:28

## 2020-02-04 RX ADMIN — METOCLOPRAMIDE HYDROCHLORIDE 10 MG: 10 TABLET ORAL at 23:46

## 2020-02-04 RX ADMIN — ENOXAPARIN SODIUM 40 MG: 40 INJECTION SUBCUTANEOUS at 09:14

## 2020-02-04 RX ADMIN — HYDROMORPHONE HYDROCHLORIDE 1 MG: 1 INJECTION, SOLUTION INTRAMUSCULAR; INTRAVENOUS; SUBCUTANEOUS at 19:51

## 2020-02-04 RX ADMIN — HYDROMORPHONE HYDROCHLORIDE 1 MG: 1 INJECTION, SOLUTION INTRAMUSCULAR; INTRAVENOUS; SUBCUTANEOUS at 19:06

## 2020-02-04 RX ADMIN — ONDANSETRON 4 MG: 2 INJECTION INTRAMUSCULAR; INTRAVENOUS at 19:08

## 2020-02-04 RX ADMIN — OXYCODONE HYDROCHLORIDE 10 MG: 10 TABLET ORAL at 11:28

## 2020-02-04 RX ADMIN — OXYCODONE HYDROCHLORIDE 10 MG: 10 TABLET ORAL at 07:36

## 2020-02-04 RX ADMIN — PIPERACILLIN AND TAZOBACTAM 3.38 G: 3; .375 INJECTION, POWDER, FOR SOLUTION INTRAVENOUS at 19:51

## 2020-02-04 RX ADMIN — METOCLOPRAMIDE 10 MG: 10 TABLET ORAL at 11:28

## 2020-02-04 RX ADMIN — HYDROMORPHONE HYDROCHLORIDE 1 MG: 1 INJECTION, SOLUTION INTRAMUSCULAR; INTRAVENOUS; SUBCUTANEOUS at 20:52

## 2020-02-04 RX ADMIN — CITALOPRAM HYDROBROMIDE 20 MG: 20 TABLET ORAL at 07:36

## 2020-02-04 RX ADMIN — IOPAMIDOL 122 ML: 755 INJECTION, SOLUTION INTRAVENOUS at 18:00

## 2020-02-04 RX ADMIN — METOCLOPRAMIDE 10 MG: 10 TABLET ORAL at 07:49

## 2020-02-04 RX ADMIN — OXYCODONE HYDROCHLORIDE 10 MG: 10 TABLET ORAL at 23:45

## 2020-02-04 ASSESSMENT — ACTIVITIES OF DAILY LIVING (ADL)
ADLS_ACUITY_SCORE: 16

## 2020-02-04 ASSESSMENT — PAIN DESCRIPTION - DESCRIPTORS
DESCRIPTORS: ACHING
DESCRIPTORS: ACHING
DESCRIPTORS: CONSTANT;CRAMPING
DESCRIPTORS: CONSTANT;CRAMPING

## 2020-02-04 ASSESSMENT — MIFFLIN-ST. JEOR: SCORE: 1551.62

## 2020-02-04 NOTE — PLAN OF CARE
A&OX4. VSS on room air. POD 7 s/p whipple. Patient's abdominal pain control with scheduled oxycodone,robaxin, ice packs and IV dilaudid for break through in the middle of the night with some relief. Old JAZZMINE site with dressing clean dry and intact. Denies N/V. On post gastrectomy diet. Abdominal incision with staples, and erythema. Passing gas, no BM this shift. Voiding spontaneously. On q 4hrs blood sugar check, sliding scale given. Independent in room. PIV SL. Phlebitis in right arm with redness marked, ice pack applied. Patient stated redness looks better than earlier yesterday.  Calls appropriately. Continue with plan of care.

## 2020-02-04 NOTE — PLAN OF CARE
Occupational Therapy Discharge Summary    Reason for therapy discharge:    Discharged to home.    Progress towards therapy goal(s). See goals on Care Plan in [facility] electronic health record for goal details.  Goals partially met.  Barriers to achieving goals:   discharge from facility.    Therapy recommendation(s):    Continued therapy is recommended.  Rationale/Recommendations:  Patient may benefit from OP PT to progress higher level balance and strength.

## 2020-02-04 NOTE — ED PROVIDER NOTES
[facility] EMERGENCY DEPARTMENT ([facility])  2/04/20    History     Chief Complaint   Patient presents with     Chest Pain     The history is provided by the patient, the spouse and medical records.     Nathalie Bashir is a 54 year old female with a past medical history of chronic pain and opioid dependence with controlled substance agreement, intraductal papillary mucinous neoplasm, depression who presents to the Emergency Department chief complaint of right-sided chest and abdominal pain.  The patient was just discharged from the hospital this morning after her Whipple procedure on 1/28/2020.  The pain was present at time of discharge.  The patient states that her pain has increased despite taking 10 mg of oxycodone at home.  The patient does report shortness of breath secondary to her chest pain. She states that it is painful to take a deep breath.  The patient had a Whipple procedure done this past week.  Her pain is described as constant, sharp, does not radiate.  It is worse with movement.  Is better with rest. Patient reports that she began to feel feverish this afternoon.  Vital signs are normal aside from slightly elevated temperature at 100.2 on arrival to the Emergency Department today. Denies leg swelling, leg pain, nausea, vomiting, hematuria, dysuria or change in BMs.    I have reviewed the Medications, Allergies, Past Medical and Surgical History, and Social History in the [employer] system.    Past Medical History:   Diagnosis Date     Allergic rhinitis      Depression      Lumbago      Migraine      Other chronic pain     low back     Sacroiliitis (H)     low back pain     Trochanteric bursitis     leg length discrrepancy, hip pain     Past Surgical History:   Procedure Laterality Date     ARTHROPLASTY HIP Left 2016     BACK SURGERY      L4-5 decompression     C REPAIR DETACH RETINA,SCLERAL BUCKLE       CATARACT IOL, RT/LT       CHOLECYSTECTOMY N/A 1/28/2020    Procedure: Cholecystectomy;   Surgeon: Yandel Mallory MD;  Location: [facility] OR     EYE SURGERY       OPEN REDUCTION INTERNAL FIXATION RODDING INTRAMEDULLARY FEMUR Left 12/23/2014    Procedure: OPEN REDUCTION INTERNAL FIXATION RODDING INTRAMEDULLARY FEMUR;  Surgeon: Arnol Santiago MD;  Location: [facility] OR     ORTHOPEDIC SURGERY       REMOVE HARDWARE LOWER EXTREMITY Left 4/7/2015    Procedure: REMOVE HARDWARE LOWER EXTREMITY;  Surgeon: Arnol Santiago MD;  Location: [facility] OR     REMOVE HARDWARE RODDING INTRAMEDULLARY FEMUR Left 12/17/2015    Procedure: REMOVE HARDWARE RODDING INTRAMEDULLARY FEMUR;  Surgeon: Arnol Santiago MD;  Location: [facility] OR     RESECT BONE LOWER EXTREMITY Left 12/23/2014    Procedure: RESECT BONE LOWER EXTREMITY;  Surgeon: Arnol Santiago MD;  Location: [facility] OR     WHIPPLE PROCEDURE N/A 1/28/2020    Procedure: Open Whipple procedure and Cholecystectomy;  Surgeon: Yandel Mallory MD;  Location: [facility] OR     No current facility-administered medications for this encounter.      Current Outpatient Medications   Medication     amitriptyline (ELAVIL) 25 MG tablet     calcium carb 1250 mg, 500 mg [place],/vitamin D 200 units (OSCAL WITH D) 500-200 MG-UNIT per tablet     citalopram (CELEXA) 20 MG tablet     diclofenac (FLECTOR) 1.3 % patch     enoxaparin ANTICOAGULANT (LOVENOX) 40 MG/0.4ML syringe     fluticasone (FLONASE) 50 MCG/ACT nasal spray     hydrOXYzine (ATARAX) 10 MG tablet     methocarbamol (ROBAXIN) 500 MG tablet     metoclopramide (REGLAN) 10 MG tablet     multivitamin, therapeutic with minerals (MULTI-VITAMIN) TABS     oxyCODONE IR (ROXICODONE) 10 MG tablet     pantoprazole (PROTONIX) 40 MG EC tablet     polyethylene glycol (MIRALAX/GLYCOLAX) packet     senna-docusate (SENOKOT-S/PERICOLACE) 8.6-50 MG tablet     SUMAtriptan (IMITREX) 100 MG tablet     tiZANidine (ZANAFLEX) 4 MG tablet        Allergies   Allergen Reactions     Gluten Meal Palpitations     Pregabalin      interfered with Cymbalta     Acetaminophen Nausea and Other  (See Comments)     Urine retention       Dust Mite Extract      Gabapentin GI Disturbance     dizziness     Methadone Fatigue     Mold      Naprosyn [Naproxen] GI Disturbance     dizziness     Oxycontin [Oxycodone]      Confusion, loopy, oxycodone is tolerated     Oxymetholone      Seasonal Allergies      Topiramate Other (See Comments)     Tongue numbness, taste alteration, irritable      Latex Rash     Social History     Socioeconomic History     Marital status:      Spouse name: Not on file     Number of children: Not on file     Years of education: Not on file     Highest education level: Not on file   Occupational History     Not on file   Social Needs     Financial resource strain: Not on file     Food insecurity:     Worry: Not on file     Inability: Not on file     Transportation needs:     Medical: Not on file     Non-medical: Not on file   Tobacco Use     Smoking status: Current Some Day Smoker     Packs/day: 0.50     Years: 25.00     Pack years: 12.50     Types: Cigarettes     Last attempt to quit: 2010     Years since quittin.4     Smokeless tobacco: Never Used     Tobacco comment: smokes 5-7 cigarettes/day   Substance and Sexual Activity     Alcohol use: No     Drug use: No     Sexual activity: Not on file   Lifestyle     Physical activity:     Days per week: Not on file     Minutes per session: Not on file     Stress: Not on file   Relationships     Social connections:     Talks on phone: Not on file     Gets together: Not on file     Attends Yazidi service: Not on file     Active member of club or organization: Not on file     Attends meetings of clubs or organizations: Not on file     Relationship status: Not on file     Intimate partner violence:     Fear of current or ex partner: Not on file     Emotionally abused: Not on file     Physically abused: Not on file     Forced sexual activity: Not on file   Other Topics Concern     Not on file   Social History Narrative     Not on file  "        Review of Systems  General: Positive for subjective fevers and chills  Skin: No rash or diaphoresis  Eyes: No eye redness or discharge  Ears/Nose/Throat: No rhinorrhea or nasal congestion  Respiratory: No cough. Positive for shortness of breath  Cardiovascular: No palpitations or leg swelling. Positive for chest pain  Gastrointestinal: Positive for abdominal pain, no constipation or diarrhea  Genitourinary: No urinary frequency, hematuria, or dysuria  Musculoskeletal: No arthralgias or myalgias  Neurologic: No numbness or weakness  Hematologic/Lymphatic/Immunologic: No leg swelling, no easy bruising/bleeding  Endocrine: No polyuria/polydypsia      Physical Exam   BP: 133/65  Pulse: 119  Temp: 100.2  F (37.9  C)  Resp: 18  Height: 172.7 cm (5' 8\")  Weight: 89.8 kg (198 lb)  SpO2: 98 %      Physical Exam  General: Well nourished, well developed, NAD  HEENT: EOMI, anicteric. NCAT, MMM  Neck: no jugular venous distension, supple, nl ROM  Cardiac: Tachycardic rate, regular rhythm. No murmurs, rubs, or gallops. Normal S1, S2.  Intact peripheral pulses  Pulm: CTAB, no stridor, wheezes, rales, rhonchi  Abd: Soft, healing vertical midline incision appears clean/dry/intact, patient has severe right upper quadrant tenderness to palpation with guarding, no rebound, nondistended.  No masses palpated.    Skin: Warm and dry to the touch.  See abdominal exam above  Extremities: No LE edema, no cyanosis, w/w/p  Neuro: A&Ox3, no gross focal deficits        ED Course        Procedures             EKG Interpretation:      Interpreted by Karina Solorzano MD  Time reviewed: 1629  Symptoms at time of EKG: Chest pain  Rhythm: Sinus tachycardia  Rate: Tachycardic, 116 bpm  Axis: normal  Ectopy: none  Conduction: normal  ST Segments/ T Waves: No ST-T wave changes  Q Waves: none  Comparison to prior: The tachycardia is new and there is lateral T wave flattening also seen, however, compared to EKG dated January 17, 2020, no other " acute changes are seen.    Clinical Impression: normal EKG          Critical Care time:  none             Labs Ordered and Resulted from Time of ED Arrival Up to the Time of Departure from the ED   CBC WITH PLATELETS DIFFERENTIAL - Abnormal; Notable for the following components:       Result Value    WBC 18.2 (*)     Hemoglobin 11.2 (*)     Hematocrit 33.9 (*)     Platelet Count 533 (*)     Absolute Neutrophil 15.9 (*)     All other components within normal limits   COMPREHENSIVE METABOLIC PANEL - Abnormal; Notable for the following components:    Glucose 146 (*)     Urea Nitrogen 5 (*)     Albumin 2.6 (*)     Protein Total 6.5 (*)     Alkaline Phosphatase 283 (*)     All other components within normal limits   ROUTINE UA WITH MICROSCOPIC - Abnormal; Notable for the following components:    Ketones Urine 40 (*)     Mucous Urine Present (*)     All other components within normal limits   LIPASE - Abnormal; Notable for the following components:    Lipase 37 (*)     All other components within normal limits   TROPONIN I   LACTIC ACID WHOLE BLOOD   LACTIC ACID WHOLE BLOOD   PERIPHERAL IV CATHETER   CARDIAC CONTINUOUS MONITORING   INFLUENZA A/B ANTIGEN   BLOOD CULTURE   BLOOD CULTURE          Results for orders placed or performed during the hospital encounter of 02/04/20 (from the past 24 hour(s))   CBC with platelets differential   Result Value Ref Range    WBC 18.2 (H) 4.0 - 11.0 10e9/L    RBC Count 3.93 3.8 - 5.2 10e12/L    Hemoglobin 11.2 (L) 11.7 - 15.7 g/dL    Hematocrit 33.9 (L) 35.0 - 47.0 %    MCV 86 78 - 100 fl    MCH 28.5 26.5 - 33.0 pg    MCHC 33.0 31.5 - 36.5 g/dL    RDW 13.8 10.0 - 15.0 %    Platelet Count 533 (H) 150 - 450 10e9/L    Diff Method Automated Method     % Neutrophils 87.7 %    % Lymphocytes 6.1 %    % Monocytes 4.8 %    % Eosinophils 0.2 %    % Basophils 0.2 %    % Immature Granulocytes 1.0 %    Nucleated RBCs 0 0 /100    Absolute Neutrophil 15.9 (H) 1.6 - 8.3 10e9/L    Absolute Lymphocytes  1.1 0.8 - 5.3 10e9/L    Absolute Monocytes 0.9 0.0 - 1.3 10e9/L    Absolute Eosinophils 0.0 0.0 - 0.7 10e9/L    Absolute Basophils 0.0 0.0 - 0.2 10e9/L    Abs Immature Granulocytes 0.2 0 - 0.4 10e9/L    Absolute Nucleated RBC 0.0     Platelet Estimate Confirming automated cell count    Comprehensive metabolic panel   Result Value Ref Range    Sodium 133 133 - 144 mmol/L    Potassium 3.4 3.4 - 5.3 mmol/L    Chloride 99 94 - 109 mmol/L    Carbon Dioxide 27 20 - 32 mmol/L    Anion Gap 7 3 - 14 mmol/L    Glucose 146 (H) 70 - 99 mg/dL    Urea Nitrogen 5 (L) 7 - 30 mg/dL    Creatinine 0.76 0.52 - 1.04 mg/dL    GFR Estimate 89 >60 mL/min/[1.73_m2]    GFR Estimate If Black >90 >60 mL/min/[1.73_m2]    Calcium 8.5 8.5 - 10.1 mg/dL    Bilirubin Total 0.5 0.2 - 1.3 mg/dL    Albumin 2.6 (L) 3.4 - 5.0 g/dL    Protein Total 6.5 (L) 6.8 - 8.8 g/dL    Alkaline Phosphatase 283 (H) 40 - 150 U/L    ALT 31 0 - 50 U/L    AST 29 0 - 45 U/L   Troponin I   Result Value Ref Range    Troponin I ES <0.015 0.000 - 0.045 ug/L   Lactic acid whole blood   Result Value Ref Range    Lactic Acid 0.8 0.7 - 2.0 mmol/L   EKG 12-lead, tracing only   Result Value Ref Range    Interpretation ECG Click View Image link to view waveform and result    Lipase   Result Value Ref Range    Lipase 37 (L) 73 - 393 U/L   Lactic acid whole blood   Result Value Ref Range    Lactic Acid 0.8 0.7 - 2.0 mmol/L   Influenza A/B antigen   Result Value Ref Range    Influenza A/B Agn Specimen Nasal     Influenza A Negative NEG^Negative    Influenza B Negative NEG^Negative   XR Chest 2 Views    Narrative    EXAM: XR CHEST 2 VW  2/4/2020 5:58 PM      HISTORY: SOB    COMPARISON: None available    FINDINGS: PA and lateral radiographs of the chest. Trachea is midline,  heart size is normal. Streaky right basilar opacity. No pneumothorax  or pleural effusion. No acute osseous or abdominal abnormality.  Surgical clips projecting over the mid abdomen.      Impression    IMPRESSION:  Streaky right basilar opacity, favor atelectasis over  consolidation.        CT Abdomen Pelvis w Contrast    Narrative    EXAMINATION: CT ABDOMEN PELVIS W CONTRAST, 2/4/2020 6:19 PM    TECHNIQUE:  Helical CT images from the lung bases through the  symphysis pubis were obtained with IV contrast. Contrast dose:  iopamidol (ISOVUE-370) solution 122 mL    COMPARISON: 12/18/2019    HISTORY: abdominal pain, recent whipple    Additional history: Status post Whipple on 1/28/2020, discharged this  morning.    FINDINGS:    LUNG BASES: Refer to separate CT chest same day.    ABDOMEN/PELVIS: Status post gastric bypass and Whipple with trace  postoperative pneumoperitoneum. Hazy stranding surrounding the  duodenum and pancreatic head with adjacent, mildly peripherally,  predominantly simple enhancing fluid collection extending to the right  lower abdominal quadrant with adjacent inflammatory changes of the  descending colon. The fluid collection extends medially surrounding  the celiac and SMA. No extravasation of contrast noted. Trace free  fluid surrounding the liver. Unremarkable hepatic parenchyma. Trace  pneumobilia. Portal vasculature is widely patent. Spleen, adrenals,  and kidneys are unremarkable. Ureters are normal along their course.  Edematous, enhancing, and thickened wall of the duodenum with  downstream decompression. Air along the anterior margin of the  duodenum is likely intraluminal. Colonic diverticulosis. Major  abdominal vasculature is widely patent. Normal caliber abdominal  aorta. Reactive abdominal mesenteric lymphadenopathy. No suspicious  pelvic mass. Small amount of free fluid in the pelvis. No concerning  inguinal or pelvic lymphadenopathy. Bladder is unremarkable.    BONES: Significant appearance of the bones. No acute or suspicious  bony abnormality. Status post left total hip arthroplasty and left  sacroiliac fixation.      Impression    IMPRESSION:  1. Status post Whipple with peripherally  enhancing, predominantly  simple fluid collections surrounding the pancreatic head and duodenum  extending inferiorly into the right lower abdominal quadrant,  suggesting developing abscess. No evidence of active abdominal  hemorrhage.   2. Significant inflammatory changes of the duodenum and ascending  colon adjacent to the fluid collections, likely reactive. The air  within the duodenum is most likely intraluminal and not pneumatosis  since it is only visualized in the anterior portions of the duodenum.  Close attention on follow-up.  3. Trace pneumoperitoneum and pneumobilia are likely postoperative.    I have personally reviewed the examination and initial interpretation  and I agree with the findings.    CHAMP THOMASON MD   UA with Microscopic   Result Value Ref Range    Color Urine Yellow     Appearance Urine Clear     Glucose Urine Negative NEG^Negative mg/dL    Bilirubin Urine Negative NEG^Negative    Ketones Urine 40 (A) NEG^Negative mg/dL    Specific Gravity Urine 1.020 1.003 - 1.035    Blood Urine Negative NEG^Negative    pH Urine 7.0 5.0 - 7.0 pH    Protein Albumin Urine Negative NEG^Negative mg/dL    Urobilinogen mg/dL 2.0 0.0 - 2.0 mg/dL    Nitrite Urine Negative NEG^Negative    Leukocyte Esterase Urine Negative NEG^Negative    Source Midstream Urine     WBC Urine 2 0 - 5 /HPF    RBC Urine 1 0 - 2 /HPF    Squamous Epithelial /HPF Urine 1 0 - 1 /HPF    Mucous Urine Present (A) NEG^Negative /LPF       Labs, vital signs, and imaging studies were reviewed by me.    Assessments & Plan (with Medical Decision Making)   The pt is a 54 yof who presents with chest and RUQ pain.  Differential diagnosis includes postoperative infection, hematoma, poorly controlled postoperative pain, pneumonia, UTI, pulmonary embolism.  Labs, CT angiogram for PE, and CT abdomen/pelvis ordered to further evaluate the patient.  Pain and nausea medications were ordered.    CT is concerning for developing abscess.  White blood cell  count is also elevated.  Zosyn ordered.  Surgery paged.    9507 Pt d/w surgery, they will come see the patient    I have reviewed the nursing notes.    I have reviewed the findings, diagnosis, plan and need for follow up with the patient.  Patient discussed with surgery, to be admitted to their service for further management. Plan was discussed with patient who understands and agrees with plan.       New Prescriptions    No medications on file       Final diagnoses:   RUQ abdominal pain   Right-sided chest pain   History of partial pancreatectomy   Postprocedural intraabdominal abscess   Leukocytosis, unspecified type     I, Colby Huynh, am serving as a trained medical scribe to document services personally performed by Karina Solorzano MD, based on the provider's statements to me.   I, Karina Solorzano MD, was physically present and have reviewed and verified the accuracy of this note documented by Colby Huynh.    2/4/2020   [facility], EMERGENCY DEPARTMENT     Karina Solorzano MD  02/04/20 9400

## 2020-02-04 NOTE — PLAN OF CARE
Abdomen incision with staples,redness present on bottom of incision, no drainage. JAZZMINE site dry/intact with sutures, JAZZMINE bulb removed 2/3.Tolerating post gastrectomy diet,passing gas, voiding spont.Oxycodone for incisional pain with relief, ambulating with cane. Patient's SO watched this RN give Lovenox injection, daughter has been taught how to give Lovenox,potassium is 3.2, Dr Rodriguez notified, po potassium given per order.Discharge instructions reviewed with patient and SO, discharged home.

## 2020-02-04 NOTE — PROGRESS NOTES
Surgery Progress Note    S:  No acute events overnight. Pt had some anxiety after discussion of pathology results yesterday. Reports some upper abdominal spasms after multiple walks yesterday with some mild associated SOB. Tolerating post-gastrectomy diet, no N/V. Passing flatus, BM yesterday.    O:  Temp:  [97  F (36.1  C)-98.7  F (37.1  C)] 98.7  F (37.1  C)  Pulse:  [] 87  Heart Rate:  [] 99  Resp:  [18-20] 18  BP: (119-139)/(50-66) 119/66  SpO2:  [94 %-96 %] 94 %    I/O last 3 completed shifts:  In: 1960 [P.O.:960; I.V.:1000]  Out: 4950 [Urine:4900; Drains:50]    UOP: 4400 / 1250    Gen: A&Ox3, NAD  Resp: breathing comfortably on RA with no increased work of breathing  Abd: Soft, Non-distended, Non-tender, No rebound or guarding.  Ext: warm and well perfused  Incisions: non-tender, non-erythematous    Labs:  Complete Blood Count   Recent Labs   Lab 02/03/20  0655 02/02/20  0802 02/01/20  0727 01/31/20  0719   WBC 9.8 9.8 13.1* 17.2*   HGB 10.1* 9.8* 10.2* 10.6*    355 328 269     Basic Metabolic Panel  Recent Labs   Lab 02/03/20  0655 02/02/20  0802 02/01/20  0727 01/31/20  0719    139 138 135   POTASSIUM 3.1* 4.0 3.3* 3.4   CHLORIDE 104 105 106 105   CO2 31 30 27 25   BUN 3* 2* 3* 5*   CR 0.73 0.74 0.64 0.63   * 143* 128* 110*     Liver Function Tests  Recent Labs   Lab 01/30/20  0711 01/29/20  0701 01/29/20  0654 01/28/20  1545   AST 36 35  --  47*   ALT 28 33  --  39   ALKPHOS 108 92  --  102   BILITOTAL 0.6 0.4  --  0.5   ALBUMIN 2.4* 2.8*  --  2.9*   INR  --   --  1.28* 1.04     Pancreatic Enzymes  Recent Labs   Lab 01/31/20  0719 01/29/20  0701   AMYLASE 13* 111*     Coagulation Profile  Recent Labs   Lab 01/29/20  0654 01/28/20  1545   INR 1.28* 1.04          A/P: Nathalie Bashir is a 54 year old female POD# 7 s/p open whipple for what was thought to be a main duct IPMN, 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 She is recovering  appropriately from surgery.    - Post operative pain and history of chronic pain: appreciate Pain Management recs. PO oxycodone, robaxin & hydroxazine prn   - post-gastrectomy diet  - bowel regimen  - continue Reglan for promotility.  - PTA amitriptyline and citalopram  - Sliding scale insulin  - Lovenox for DVT prophylaxis, protonix IV  - Anticipate discharge today if tolerating diet and able to ambulate without issue    Seen with chief who will discuss with staff    Abid Rodriguez, PGY-3  General Surgery

## 2020-02-04 NOTE — DISCHARGE SUMMARY
SURGICAL ONCOLOGY DISCHARGE SUMMARY  Patient Name: Nathalie Bashir  MR#: [id number]  Date of Admission: 1/28/2020  6:09 AM  Date of Discharge: 2/4/2020  Operating Physician: Dr. Mallory  Discharging Physician: Dr. Mallory    Discharge Diagnoses:  1. Malignant neoplasm of pyloric antrum (H)    2. IPMN (intraductal papillary mucinous neoplasm)    3. IPMN (intraductal papillary mucinous neoplasm)        Procedures Performed this admission:  Procedure(s):  Open Whipple procedure and Cholecystectomy  Cholecystectomy    Consultations:  PHYSICAL THERAPY ADULT IP CONSULT  OCCUPATIONAL THERAPY ADULT IP CONSULT  PAIN MANAGEMENT ADULT IP CONSULT  VASCULAR ACCESS CARE ADULT IP CONSULT  VASCULAR ACCESS CARE ADULT IP CONSULT    Brief HPI:  Ms. Nathalie Bashir is a 54 year old female with a history of chronic pain, depression, and migraines who was found to have dilated pancreatic duct concerning for main duct IPMN during workup for GI complaints. She was observed for more than a year, but on her most recent imaging there was evidence of increasing size of the duct, now > 1 cm, and she underwent Whipple procedure with Dr. Mallory on 1/28/20.    Hospital Course:   Nathalie Bashir was admitted s/p the aforementioned procedure which the patient tolerated well without complications. The patient was transferred to the general floor for postoperative recovery. Cardiopulmonary and renal status remained stable throughout the admission. Diet was advanced and patient achieved full return of bowel function. She has had persistent issues with chronic pain and pain management was consulted pre-operatively and again during the admission for assistance with pain control.    On day of discharge, she was tolerating a regular diet, ambulating, voiding spontaneously without difficulty, and pain was controlled with oral pain medications. The patient was discharged home in stable and improved condition. She will follow up in clinic in 2  "weeks.    Pathology:  ID Type Source Tests Collected by Time Destination   A : hepatic artery lymph node Tissue Lymph Node SURGICAL PATHOLOGY EXAM Yandel Mallory MD 1/28/2020 10:05 AM    B : gallbladder Organ Gallbladder and Contents SURGICAL PATHOLOGY EXAM Yandel Mallory MD 1/28/2020 10:33 AM    C : whipple-single tail marks pancreatic margin, double tail marks bile duct margin Tissue Other SURGICAL PATHOLOGY EXAM Yandel Mallory MD 1/28/2020 11:50 AM      SPECIMEN(S):   A: Lymph node, hepatic artery   B: Gallbladder and contents   C: Whipple     FINAL DIAGNOSIS:   A. Lymph Node, Hepatic Artery, Excision:   One benign lymph node (0/1)     B. Gallbladder, Cholecystectomy:   No significant morphologic abnormality     C. Stomach, Duodenum & Pancreas; Whipple Resection:   STOMACH: ADENOCARCINOMA, POORLY DIFFERENTIATED:    -Poorly cohesive/signet ring-cell (diffuse) type    -Size: diffuse within gastric wall (See Comment)    -POSITIVE proximal gastric mucosal & serosal margins    -Other margins: negative (bile duct, pancreatic, & duodenal/distal)    -POSITIVE lymphovascular & perineural invasions    -No metastasis to any of 22 sampled lymph nodes (0 /22)    -Report of Her2 FISH testing to follow    -AJCC/TNM stage: pT4a N0 (See also synoptic data)     PANCREAS, DUODENUM & BILE DUCT:    -Mildly thickened main pancreatic duct by fibrosis    -Negative for pancreatic ductal neoplasm    -Unremarkable bile duct wall    -No apparent duodenal mucosal or other morphologic abnormality     Discharge Exam:  /66 (BP Location: Left arm)   Pulse 87   Temp 98.7  F (37.1  C) (Oral)   Resp 18   Ht 1.727 m (5' 7.99\")   Wt 90.1 kg (198 lb 11.2 oz)   LMP 09/23/2014   SpO2 94%   BMI 30.22 kg/m    General: Alert, in no acute distress.  HEENT: Normocephalic, atraumatic.   Respiratory: Breathing comfortably.  Cardiovascular: Regular rate and rhythm.   Abdomen: soft, non-distended, appropriately tender, no rebound or guarding. " Midline incision with staples in place, otherwise c/d/i  Extremities: no edema, wwp    Medications on Discharge:      Review of your medicines      START taking      Dose / Directions   enoxaparin ANTICOAGULANT 40 MG/0.4ML syringe  Commonly known as:  LOVENOX  Used for:  IPMN (intraductal papillary mucinous neoplasm), Malignant neoplasm of pyloric antrum (H)      Dose:  40 mg  Inject 0.4 mLs (40 mg) Subcutaneous every 24 hours for 21 days  Quantity:  8.4 mL  Refills:  0     methocarbamol 500 MG tablet  Commonly known as:  ROBAXIN  Used for:  IPMN (intraductal papillary mucinous neoplasm), Malignant neoplasm of pyloric antrum (H)      Dose:  500 mg  Take 1 tablet (500 mg) by mouth 4 times daily  Quantity:  40 tablet  Refills:  0     metoclopramide 10 MG tablet  Commonly known as:  REGLAN  Used for:  IPMN (intraductal papillary mucinous neoplasm), Malignant neoplasm of pyloric antrum (H)      Dose:  10 mg  Take 1 tablet (10 mg) by mouth 4 times daily (before meals and nightly)  Quantity:  120 tablet  Refills:  0     pantoprazole 40 MG EC tablet  Commonly known as:  PROTONIX  Used for:  IPMN (intraductal papillary mucinous neoplasm), Malignant neoplasm of pyloric antrum (H)      Dose:  40 mg  Take 1 tablet (40 mg) by mouth every morning (before breakfast)  Quantity:  90 tablet  Refills:  0     polyethylene glycol packet  Commonly known as:  MIRALAX/GLYCOLAX  Used for:  IPMN (intraductal papillary mucinous neoplasm), Malignant neoplasm of pyloric antrum (H)      Dose:  17 g  Take 17 g by mouth daily  Quantity:  30 packet  Refills:  0     senna-docusate 8.6-50 MG tablet  Commonly known as:  SENOKOT-S/PERICOLACE  Used for:  IPMN (intraductal papillary mucinous neoplasm), Malignant neoplasm of pyloric antrum (H)      Dose:  1 tablet  Take 1 tablet by mouth At Bedtime for 14 days  Quantity:  14 tablet  Refills:  0        CONTINUE these medicines which have NOT CHANGED      Dose / Directions   amitriptyline 25 MG  tablet  Commonly known as:  ELAVIL      Dose:  3 tablet  Take 3 tablets by mouth At Bedtime  Refills:  0     calcium carbonate-vitamin D 500-200 MG-UNIT tablet  Commonly known as:  OSCAL w/D      Dose:  1 tablet  Take 1 tablet by mouth 3 times daily (with meals)  Refills:  0     citalopram 20 MG tablet  Commonly known as:  celeXA      Dose:  20 mg  Take 20 mg by mouth every morning  Refills:  0     diclofenac 1.3 % patch  Commonly known as:  FLECTOR      Dose:  1 patch  Place 1 patch onto the skin daily as needed  Refills:  0     fluticasone 50 MCG/ACT nasal spray  Commonly known as:  FLONASE      Dose:  2 spray  Spray 2 sprays into both nostrils daily as needed  Refills:  0     hydrOXYzine 10 MG tablet  Commonly known as:  ATARAX      Dose:  10 mg  Take 10 mg by mouth 3 times daily as needed for itching  Refills:  0     Multi-vitamin tablet      Dose:  1 tablet  Take 1 tablet by mouth daily  Refills:  0     oxyCODONE IR 10 MG tablet  Commonly known as:  ROXICODONE      Dose:  10 mg  Take 10 mg by mouth 4 times daily  Refills:  0     SUMAtriptan 100 MG tablet  Commonly known as:  IMITREX      Dose:  100 mg  Take 100 mg by mouth as needed  Refills:  0     tiZANidine 4 MG tablet  Commonly known as:  ZANAFLEX      Dose:  1 tablet  Take 1 tablet by mouth 2 times daily as needed  Refills:  0           Where to get your medicines      These medications were sent to [facility] - [place], [place] - [address]  [address] [address]    Phone:  [phone]     enoxaparin ANTICOAGULANT 40 MG/0.4ML syringe    methocarbamol 500 MG tablet    metoclopramide 10 MG tablet    pantoprazole 40 MG EC tablet    polyethylene glycol packet    senna-docusate 8.6-50 MG tablet       Discharge Procedure Orders   Reason for your hospital stay   Order Comments: Whipple procedure  Gastric cancer     Activity   Order Comments: Your activity upon discharge: activity as tolerated     Order Specific  "Question Answer Comments   Is discharge order? Yes      Discharge Instructions   Order Comments: If your travel plans upon discharge include prolonged periods of sitting (a lengthy car or plane ride), it is highly beneficial to get up and walk at least once per hour to help prevent swelling and blood clots.     You may shower after operation, let warm soapy water run over incision and pat dry. Do not submerge, soak, or scrub incision.    Take incentive spirometer home for continued frequent use    You will be discharged with narcotic pain medications. Please take them only as needed and do not operate a car or heavy machinery for 24 hours after taking them.  Narcotic pain medications like oxycodone are constipating. Please ensure that you are drinking adequate amounts of fluids and taking stool softeners while you are taking narcotics. Take Miralax as needed for constipation.     Do not drive until you can with stand pressing the brake pedal quickly and fully without pain and you are not distracted by pain.     Call for fever greater than 101.5, chills, red skin around incision, drainage from incision, increased swelling from the incision, bleeding from the incision that is not controlled with light pressure, inability to tolerate diet,new nausea/vomiting or other new/worsening symptoms.   You may also call the surgical oncology nurse care coordinator from 8:00am-4:30pm MICHAEL Ace at [phone]. For after hours questions or concerns you can contact the on-call surgical oncology resident (nights and weekends [phone] and ask \"I would like to page the Surgical Oncology Resident on call.\"). In emergencies, call [phone]    Diet:  - 4-6 small meals a day. You will need to snack throughout the day rather than 3 big meals a day.   - Once you begin to feel full, you should STOP. Do not over eat, this can lead to abdominal pain and vomiting.   - Make sure to sit up when you are eating. Do not lay down after eating " for at least 1 hour.   - Any time you are eating you should be sitting up in a chair, do not eat in bed.     - Avoid red meat at this time. This is difficult to digest.   - If you do get nauseous during eating or after you should stop. Give it time and only proceed with liquid diet for a day before trying solids again.     If patient is sent home on modified whipple diet/soft diet:  - Daily nutrition shakes, high in protein, high in calorie. These help meet daily calorie intake.   - Can try everything included in the liquid diet above as well.   - Stick to foods that can be mashed with a fork EASILY.   - Avoid foods that are high in acidity or spices. These are high risk for causing reflux.   - Avoid raw vegetables. These are difficult to digest.   Examples: Mashed potatoes, eggs, pancakes, bread, toast, salmon/fish (not fried), steamed veggies, puddings, ice cream, oatmeal.     Follow Up:  Follow up with Dr. Mallory on 2/21/2020 as scheduled.     Adult [facility] Follow-up and recommended labs and tests   Order Comments: Follow up with Dr. Mallory on 2/21/2020 as scheduled.    Appointments on [place] and/or [facility] (with [facility] or [facility] provider or service). Call [phone] if you haven't heard regarding these appointments within 7 days of discharge.     Diet   Order Comments: Follow this diet upon discharge: Post Gastrectomy Diet     Order Specific Question Answer Comments   Is discharge order? Yes        Abdi Rodriguez, PGY-3  General Surgery

## 2020-02-04 NOTE — TELEPHONE ENCOUNTER
"Pt called in to triage reporting she discharged from the hospital around 1130 this morning. State increasing RLQ pain since she's been home, can \"barely breathe\" due to pain at this time. Advised to come back to ED for evaluation, pt state they will drive back.  "

## 2020-02-04 NOTE — ED TRIAGE NOTES
"Triage Assessment & Note:    /65   Pulse 119   Temp 100.2  F (37.9  C) (Oral)   Resp 18   Ht 1.727 m (5' 8\")   Wt 89.8 kg (198 lb)   LMP 09/23/2014   SpO2 98%   BMI 30.11 kg/m      Patient presents with: PT c/o right side CP which was present this AM when she discharged. PT reports her pain has increased despite taking 10mg of Oxy. Pt is A&Ox4 ABC's WDL. PT reports SOB due to the pain in her right chest and RQU.     Home Treatments/Remedies: oxy    Febrile / Afebrile? Febrile     Duration of C/o: Since this AM    Maurice Shelton RN  February 4, 2020      "

## 2020-02-04 NOTE — ED NOTES
"ED Triage Provider Note  [facility]  Encounter Date: Feb 4, 2020    History:  Chief Complaint   Patient presents with     Chest Pain     Nathalie Bashir is a 54 year old female who presents to the ED with right-sided abdominal pain and shortness of breath.  Patient had Whipple procedure done within the last week and she was discharged from the hospital earlier today.  Her right sided abdominal pain has worsened since discharge and she is having difficult time breathing as well.  Her pain is constant, sharp, nonradiating, movement makes it worse, rest improves it.  No fevers.  She called her specialty clinic and she was advised to come to the emergency department for further evaluation.    Review of Systems:  Abdominal pain  Shortness of breath    Exam:  /65   Pulse 119   Temp 100.2  F (37.9  C) (Oral)   Resp 18   Ht 1.727 m (5' 8\")   Wt 89.8 kg (198 lb)   LMP 09/23/2014   SpO2 98%   BMI 30.11 kg/m    General: No acute distress. Appears stated age.   Cardio: Regular rate, extremities well perfused  Resp: Normal work of breathing, grossly normal respiratory rate  Neuro: Alert. CN II-XII grossly intact. Grossly intact strength.    Medical Decision Making:  Patient arriving to the ED with problem as above. A medical screening exam was performed. Orders initiated from Triage. The patient is appropriate to be immediately roomed.      Luca Haley MD on 2/4/2020 at 4:15 PM     Luca Haley MD  02/04/20 1620    "

## 2020-02-05 LAB
ANION GAP SERPL CALCULATED.3IONS-SCNC: 8 MMOL/L (ref 3–14)
BASOPHILS # BLD AUTO: 0.1 10E9/L (ref 0–0.2)
BASOPHILS NFR BLD AUTO: 0.3 %
BUN SERPL-MCNC: 5 MG/DL (ref 7–30)
CALCIUM SERPL-MCNC: 8.2 MG/DL (ref 8.5–10.1)
CHLORIDE SERPL-SCNC: 102 MMOL/L (ref 94–109)
CO2 SERPL-SCNC: 25 MMOL/L (ref 20–32)
CREAT SERPL-MCNC: 0.76 MG/DL (ref 0.52–1.04)
DIFFERENTIAL METHOD BLD: ABNORMAL
EOSINOPHIL # BLD AUTO: 0 10E9/L (ref 0–0.7)
EOSINOPHIL NFR BLD AUTO: 0.1 %
ERYTHROCYTE [DISTWIDTH] IN BLOOD BY AUTOMATED COUNT: 14.2 % (ref 10–15)
GFR SERPL CREATININE-BSD FRML MDRD: 89 ML/MIN/{1.73_M2}
GLUCOSE SERPL-MCNC: 95 MG/DL (ref 70–99)
HCT VFR BLD AUTO: 30.2 % (ref 35–47)
HGB BLD-MCNC: 9.9 G/DL (ref 11.7–15.7)
IMM GRANULOCYTES # BLD: 0.3 10E9/L (ref 0–0.4)
IMM GRANULOCYTES NFR BLD: 1.2 %
LACTATE BLD-SCNC: 0.5 MMOL/L (ref 0.7–2)
LYMPHOCYTES # BLD AUTO: 1.1 10E9/L (ref 0.8–5.3)
LYMPHOCYTES NFR BLD AUTO: 3.8 %
MAGNESIUM SERPL-MCNC: 1.8 MG/DL (ref 1.6–2.3)
MCH RBC QN AUTO: 28.8 PG (ref 26.5–33)
MCHC RBC AUTO-ENTMCNC: 32.8 G/DL (ref 31.5–36.5)
MCV RBC AUTO: 88 FL (ref 78–100)
MONOCYTES # BLD AUTO: 1.7 10E9/L (ref 0–1.3)
MONOCYTES NFR BLD AUTO: 6.1 %
NEUTROPHILS # BLD AUTO: 24.5 10E9/L (ref 1.6–8.3)
NEUTROPHILS NFR BLD AUTO: 88.5 %
NRBC # BLD AUTO: 0 10*3/UL
NRBC BLD AUTO-RTO: 0 /100
PHOSPHATE SERPL-MCNC: 3.6 MG/DL (ref 2.5–4.5)
PLATELET # BLD AUTO: 444 10E9/L (ref 150–450)
POTASSIUM SERPL-SCNC: 3.4 MMOL/L (ref 3.4–5.3)
RBC # BLD AUTO: 3.44 10E12/L (ref 3.8–5.2)
SODIUM SERPL-SCNC: 134 MMOL/L (ref 133–144)
WBC # BLD AUTO: 27.6 10E9/L (ref 4–11)

## 2020-02-05 PROCEDURE — 25000128 H RX IP 250 OP 636: Performed by: STUDENT IN AN ORGANIZED HEALTH CARE EDUCATION/TRAINING PROGRAM

## 2020-02-05 PROCEDURE — 25000128 H RX IP 250 OP 636: Performed by: SURGERY

## 2020-02-05 PROCEDURE — 85025 COMPLETE CBC W/AUTO DIFF WBC: CPT | Performed by: SURGERY

## 2020-02-05 PROCEDURE — 83605 ASSAY OF LACTIC ACID: CPT | Performed by: SURGERY

## 2020-02-05 PROCEDURE — 83735 ASSAY OF MAGNESIUM: CPT | Performed by: SURGERY

## 2020-02-05 PROCEDURE — 36415 COLL VENOUS BLD VENIPUNCTURE: CPT | Performed by: SURGERY

## 2020-02-05 PROCEDURE — 84100 ASSAY OF PHOSPHORUS: CPT | Performed by: SURGERY

## 2020-02-05 PROCEDURE — 25800030 ZZH RX IP 258 OP 636: Performed by: STUDENT IN AN ORGANIZED HEALTH CARE EDUCATION/TRAINING PROGRAM

## 2020-02-05 PROCEDURE — 12000001 ZZH R&B MED SURG/OB UMMC

## 2020-02-05 PROCEDURE — 25000132 ZZH RX MED GY IP 250 OP 250 PS 637: Mod: GY | Performed by: STUDENT IN AN ORGANIZED HEALTH CARE EDUCATION/TRAINING PROGRAM

## 2020-02-05 PROCEDURE — 80048 BASIC METABOLIC PNL TOTAL CA: CPT | Performed by: SURGERY

## 2020-02-05 PROCEDURE — 40000141 ZZH STATISTIC PERIPHERAL IV START W/O US GUIDANCE

## 2020-02-05 PROCEDURE — 25000132 ZZH RX MED GY IP 250 OP 250 PS 637: Mod: GY | Performed by: SURGERY

## 2020-02-05 RX ORDER — DEXTROSE MONOHYDRATE, SODIUM CHLORIDE, AND POTASSIUM CHLORIDE 50; 1.49; 4.5 G/1000ML; G/1000ML; G/1000ML
INJECTION, SOLUTION INTRAVENOUS CONTINUOUS
Status: DISPENSED | OUTPATIENT
Start: 2020-02-05 | End: 2020-02-07

## 2020-02-05 RX ORDER — HYDROXYZINE HYDROCHLORIDE 10 MG/1
10 TABLET, FILM COATED ORAL 3 TIMES DAILY PRN
Status: DISCONTINUED | OUTPATIENT
Start: 2020-02-05 | End: 2020-02-15 | Stop reason: HOSPADM

## 2020-02-05 RX ORDER — OXYCODONE HYDROCHLORIDE 10 MG/1
10 TABLET ORAL EVERY 6 HOURS PRN
Status: DISCONTINUED | OUTPATIENT
Start: 2020-02-05 | End: 2020-02-06

## 2020-02-05 RX ORDER — METHOCARBAMOL 500 MG/1
500 TABLET, FILM COATED ORAL 4 TIMES DAILY
Status: DISCONTINUED | OUTPATIENT
Start: 2020-02-05 | End: 2020-02-15 | Stop reason: HOSPADM

## 2020-02-05 RX ORDER — HYDROMORPHONE HCL/0.9% NACL/PF 0.2MG/0.2
.2-.4 SYRINGE (ML) INTRAVENOUS
Status: DISCONTINUED | OUTPATIENT
Start: 2020-02-05 | End: 2020-02-14

## 2020-02-05 RX ADMIN — ENOXAPARIN SODIUM 40 MG: 40 INJECTION SUBCUTANEOUS at 08:43

## 2020-02-05 RX ADMIN — Medication 0.4 MG: at 13:14

## 2020-02-05 RX ADMIN — POTASSIUM CHLORIDE, DEXTROSE MONOHYDRATE AND SODIUM CHLORIDE: 150; 5; 450 INJECTION, SOLUTION INTRAVENOUS at 17:59

## 2020-02-05 RX ADMIN — METOCLOPRAMIDE HYDROCHLORIDE 10 MG: 10 TABLET ORAL at 22:11

## 2020-02-05 RX ADMIN — SENNOSIDES AND DOCUSATE SODIUM 1 TABLET: 8.6; 5 TABLET ORAL at 22:11

## 2020-02-05 RX ADMIN — PANTOPRAZOLE SODIUM 40 MG: 40 TABLET, DELAYED RELEASE ORAL at 08:41

## 2020-02-05 RX ADMIN — METHOCARBAMOL 500 MG: 500 TABLET, FILM COATED ORAL at 12:39

## 2020-02-05 RX ADMIN — METHOCARBAMOL 500 MG: 500 TABLET, FILM COATED ORAL at 08:42

## 2020-02-05 RX ADMIN — OXYCODONE HYDROCHLORIDE 15 MG: 5 TABLET ORAL at 06:34

## 2020-02-05 RX ADMIN — METHOCARBAMOL 500 MG: 500 TABLET, FILM COATED ORAL at 16:18

## 2020-02-05 RX ADMIN — METOCLOPRAMIDE HYDROCHLORIDE 10 MG: 10 TABLET ORAL at 12:39

## 2020-02-05 RX ADMIN — METHOCARBAMOL 500 MG: 500 TABLET, FILM COATED ORAL at 19:06

## 2020-02-05 RX ADMIN — Medication 0.4 MG: at 01:45

## 2020-02-05 RX ADMIN — CEFEPIME HYDROCHLORIDE 2 G: 2 INJECTION, POWDER, FOR SOLUTION INTRAVENOUS at 22:11

## 2020-02-05 RX ADMIN — OXYCODONE HYDROCHLORIDE 10 MG: 10 TABLET ORAL at 12:38

## 2020-02-05 RX ADMIN — MICAFUNGIN SODIUM 100 MG: 10 INJECTION, POWDER, LYOPHILIZED, FOR SOLUTION INTRAVENOUS at 11:06

## 2020-02-05 RX ADMIN — LEVOFLOXACIN 750 MG: 5 INJECTION, SOLUTION INTRAVENOUS at 01:46

## 2020-02-05 RX ADMIN — Medication 0.4 MG: at 15:27

## 2020-02-05 RX ADMIN — DICLOFENAC EPOLAMINE 1 PATCH: 0.01 PATCH TOPICAL at 03:59

## 2020-02-05 RX ADMIN — HYDROXYZINE HYDROCHLORIDE 10 MG: 10 TABLET ORAL at 08:54

## 2020-02-05 RX ADMIN — METRONIDAZOLE 500 MG: 500 INJECTION, SOLUTION INTRAVENOUS at 00:03

## 2020-02-05 RX ADMIN — HYDROXYZINE HYDROCHLORIDE 10 MG: 10 TABLET, FILM COATED ORAL at 00:17

## 2020-02-05 RX ADMIN — METRONIDAZOLE 500 MG: 500 INJECTION, SOLUTION INTRAVENOUS at 16:18

## 2020-02-05 RX ADMIN — Medication 0.4 MG: at 17:34

## 2020-02-05 RX ADMIN — METHOCARBAMOL 500 MG: 500 TABLET, FILM COATED ORAL at 00:39

## 2020-02-05 RX ADMIN — METOCLOPRAMIDE HYDROCHLORIDE 10 MG: 10 TABLET ORAL at 16:18

## 2020-02-05 RX ADMIN — Medication 0.4 MG: at 22:17

## 2020-02-05 RX ADMIN — Medication 0.4 MG: at 04:04

## 2020-02-05 RX ADMIN — METOCLOPRAMIDE HYDROCHLORIDE 10 MG: 10 TABLET ORAL at 08:41

## 2020-02-05 RX ADMIN — AMITRIPTYLINE HYDROCHLORIDE 75 MG: 75 TABLET, FILM COATED ORAL at 22:11

## 2020-02-05 RX ADMIN — CEFEPIME HYDROCHLORIDE 2 G: 2 INJECTION, POWDER, FOR SOLUTION INTRAVENOUS at 10:05

## 2020-02-05 RX ADMIN — METRONIDAZOLE 500 MG: 500 INJECTION, SOLUTION INTRAVENOUS at 08:38

## 2020-02-05 RX ADMIN — Medication 0.4 MG: at 19:34

## 2020-02-05 RX ADMIN — CITALOPRAM HYDROBROMIDE 20 MG: 20 TABLET ORAL at 08:41

## 2020-02-05 ASSESSMENT — ACTIVITIES OF DAILY LIVING (ADL)
ADLS_ACUITY_SCORE: 16

## 2020-02-05 ASSESSMENT — PAIN DESCRIPTION - DESCRIPTORS
DESCRIPTORS: CONSTANT;CRAMPING
DESCRIPTORS: ACHING;CONSTANT
DESCRIPTORS: ACHING;CONSTANT
DESCRIPTORS: CONSTANT;CRAMPING
DESCRIPTORS: CONSTANT;SHARP
DESCRIPTORS: ACHING;CONSTANT;SORE
DESCRIPTORS: ACHING;CONSTANT;SORE

## 2020-02-05 NOTE — PLAN OF CARE
A&OX4. VSS on room air ex tachycardic. Admitted with abdominal pain. POD#8 s/p whipple. Old JAZZMINE site with dressing clean dry and intact. Midline incision staples clean dry and intact. Abdominal pain control with PRN oxycodone, IV dilaudid, ice packs and diclofenac patch under lower right breast with some relief. PIV TKO in between antibiotics. Voiding spontaneously using bedside commode. 2 person skin assessment done with Niecy BALDERAS On post gastrectomy diet, denies N/V. Up with SBA assist. Calls appropriately. Continue with plan of care.

## 2020-02-05 NOTE — PLAN OF CARE
"Patient c/o pain (RUQ  \"under R breast\") despite po meds Oxycodone and scheduled Robaxin. IV Dilaudid given for breakthrough pain.  She voids spont., has used the commode with stand by assistance. PO Temp: 100.3 , tachy: 110-120 range. PIV NS TKO for IV antibiotics and IV anti-fungal. Patient is aware that she is NPO. Midline abd incision with staples--healing (POD# 8: whipple.) Pleasant. Bedside report: Yes.   "

## 2020-02-05 NOTE — H&P
History and Physical     Nathalie Bashir MRN# [id number]   YOB: 1965 Age: 54 year old      Date of Admission:  2/4/2020        Chief Complaint:   CC: abdominal pain          History of Present Illnes:     54-year-old female with recent open Whipple procedure on 1/28/2024 IPMN discharged earlier today who returns to the ER with sharp abdominal pain, low-grade fever and a feeling of fullness.  In ER, her white count was found to be 18 and a normal lactate.  CT shows some fluid collections around pancreatic head and duodenum and and other expected postoperative changes postoperative changes.  Patient complains of significant pain that is only partially relieved with IV Dilaudid in the ER.    Past Medical History:  Past Medical History:   Diagnosis Date     Allergic rhinitis      Depression      Lumbago      Migraine      Other chronic pain     low back     Sacroiliitis (H)     low back pain     Trochanteric bursitis     leg length discrrepancy, hip pain       Past Surgical History:  Past Surgical History:   Procedure Laterality Date     ARTHROPLASTY HIP Left 2016     BACK SURGERY      L4-5 decompression     C REPAIR DETACH RETINA,SCLERAL BUCKLE       CATARACT IOL, RT/LT       CHOLECYSTECTOMY N/A 1/28/2020    Procedure: Cholecystectomy;  Surgeon: Yandel Mallory MD;  Location: [facility] OR     EYE SURGERY       OPEN REDUCTION INTERNAL FIXATION RODDING INTRAMEDULLARY FEMUR Left 12/23/2014    Procedure: OPEN REDUCTION INTERNAL FIXATION RODDING INTRAMEDULLARY FEMUR;  Surgeon: Arnol Santiago MD;  Location: [facility] OR     ORTHOPEDIC SURGERY       REMOVE HARDWARE LOWER EXTREMITY Left 4/7/2015    Procedure: REMOVE HARDWARE LOWER EXTREMITY;  Surgeon: Arnol Santiago MD;  Location: [facility] OR     REMOVE HARDWARE RODDING INTRAMEDULLARY FEMUR Left 12/17/2015    Procedure: REMOVE HARDWARE RODDING INTRAMEDULLARY FEMUR;  Surgeon: Arnol Santiago MD;  Location: [facility] OR     RESECT BONE LOWER EXTREMITY Left  12/23/2014    Procedure: RESECT BONE LOWER EXTREMITY;  Surgeon: Arnol Santiago MD;  Location: [facility] OR     WHIPPLE PROCEDURE N/A 1/28/2020    Procedure: Open Whipple procedure and Cholecystectomy;  Surgeon: Yandel Mallory MD;  Location: [facility] OR       Allergies:     Allergies   Allergen Reactions     Gluten Meal Palpitations     Pregabalin      interfered with Cymbalta     Acetaminophen Nausea and Other (See Comments)     Urine retention       Dust Mite Extract      Gabapentin GI Disturbance     dizziness     Methadone Fatigue     Mold      Naprosyn [Naproxen] GI Disturbance     dizziness     Oxycontin [Oxycodone]      Confusion, loopy, oxycodone is tolerated     Oxymetholone      Seasonal Allergies      Topiramate Other (See Comments)     Tongue numbness, taste alteration, irritable      Latex Rash       Medications:  [COMPLETED] potassium chloride ER (K-DUR/KLOR-CON M) CR tablet 40 mEq    amitriptyline (ELAVIL) 25 MG tablet, Take 3 tablets by mouth At Bedtime  calcium carb 1250 mg, 500 mg [place],/vitamin D 200 units (OSCAL WITH D) 500-200 MG-UNIT per tablet, Take 1 tablet by mouth 3 times daily (with meals)  citalopram (CELEXA) 20 MG tablet, Take 20 mg by mouth every morning   diclofenac (FLECTOR) 1.3 % patch, Place 1 patch onto the skin daily as needed   enoxaparin ANTICOAGULANT (LOVENOX) 40 MG/0.4ML syringe, Inject 0.4 mLs (40 mg) Subcutaneous every 24 hours for 21 days  fluticasone (FLONASE) 50 MCG/ACT nasal spray, Spray 2 sprays into both nostrils daily as needed   hydrOXYzine (ATARAX) 10 MG tablet, Take 10 mg by mouth 3 times daily as needed for itching  methocarbamol (ROBAXIN) 500 MG tablet, Take 1 tablet (500 mg) by mouth 4 times daily  metoclopramide (REGLAN) 10 MG tablet, Take 1 tablet (10 mg) by mouth 4 times daily (before meals and nightly)  multivitamin, therapeutic with minerals (MULTI-VITAMIN) TABS, Take 1 tablet by mouth daily  oxyCODONE IR (ROXICODONE) 10 MG tablet, Take 10 mg by mouth 4 times  daily  pantoprazole (PROTONIX) 40 MG EC tablet, Take 1 tablet (40 mg) by mouth every morning (before breakfast)  polyethylene glycol (MIRALAX/GLYCOLAX) packet, Take 17 g by mouth daily  senna-docusate (SENOKOT-S/PERICOLACE) 8.6-50 MG tablet, Take 1 tablet by mouth At Bedtime for 14 days  SUMAtriptan (IMITREX) 100 MG tablet, Take 100 mg by mouth as needed  tiZANidine (ZANAFLEX) 4 MG tablet, Take 1 tablet by mouth 2 times daily as needed        Social History:  Social History     Socioeconomic History     Marital status:      Spouse name: Not on file     Number of children: Not on file     Years of education: Not on file     Highest education level: Not on file   Occupational History     Not on file   Social Needs     Financial resource strain: Not on file     Food insecurity:     Worry: Not on file     Inability: Not on file     Transportation needs:     Medical: Not on file     Non-medical: Not on file   Tobacco Use     Smoking status: Current Some Day Smoker     Packs/day: 0.50     Years: 25.00     Pack years: 12.50     Types: Cigarettes     Last attempt to quit: 2010     Years since quittin.4     Smokeless tobacco: Never Used     Tobacco comment: smokes 5-7 cigarettes/day   Substance and Sexual Activity     Alcohol use: No     Drug use: No     Sexual activity: Not on file   Lifestyle     Physical activity:     Days per week: Not on file     Minutes per session: Not on file     Stress: Not on file   Relationships     Social connections:     Talks on phone: Not on file     Gets together: Not on file     Attends Sikh service: Not on file     Active member of club or organization: Not on file     Attends meetings of clubs or organizations: Not on file     Relationship status: Not on file     Intimate partner violence:     Fear of current or ex partner: Not on file     Emotionally abused: Not on file     Physically abused: Not on file     Forced sexual activity: Not on file   Other Topics Concern  "    Not on file   Social History Narrative     Not on file       Family History:  Family History   Problem Relation Age of Onset     Heart Failure Mother 77     Anesthesia Reaction No family hx of      Deep Vein Thrombosis No family hx of        ROS:  The remainder of the complete ROS was negative unless noted in the HPI.    Exam:  /63 (BP Location: Left arm)   Pulse 112   Temp 99.8  F (37.7  C) (Oral)   Resp 24   Ht 1.727 m (5' 8\")   Wt 89.8 kg (198 lb)   LMP 09/23/2014   SpO2 94%   BMI 30.11 kg/m    General: mildly distressed  HEENT: AT/NC, sclera anicteric   NLB on RA  Cardiac: regular rate and rhythm, no murmur  Abdomen: Soft, mildly tender. RLQ drain stitch removed x2  Extremities: No LE edema  Skin: Warm and dry, no jaundice or rash  Neuro: Alert & oriented x 3, Cns 2-12 intact, moves all extremities equally    Labs:  WBC 18  Lactate 0.9    Imaging:  IMPRESSION:  1. Status post Whipple with peripherally enhancing, predominantly  simple fluid collections surrounding the pancreatic head and duodenum  extending inferiorly into the right lower abdominal quadrant,  suggesting developing abscess. No evidence of active abdominal  hemorrhage.   2. Significant inflammatory changes of the duodenum and ascending  colon adjacent to the fluid collections, likely reactive. The air  within the duodenum is most likely intraluminal and not pneumatosis  since it is only visualized in the anterior portions of the duodenum.  Close attention on follow-up.  3. Trace pneumoperitoneum and pneumobilia are likely postoperative.    Assessment/ Plan:  54-year-old female with recent Whipple procedure for IPMN on 1/28/2020 discharged today who returns to ED with abdominal pain and white count.  Peripancreatic fluid on CT which is most likely postoperative changes versus less likely developing abscess.    -Admit to surgical oncology  -Levaquin and Flagyl  -Right lower quadrant drain site stitches removed, will place ostomy " appliance to collect drainage  -PO and IV pain meds as needed  -Surg onc team to see in a.m.    Discussed with chief who discussed with staff.    Marianne Nieto MD PGY3  General Surgery

## 2020-02-05 NOTE — PROGRESS NOTES
Admitted/transferred from: ED  2 RN full skin assessment completed by Niecy Yi, RN and Leti Carroll, RN.  Skin assessment finding:  issues found Old abd incision with staples, old JAZZMINE site with dressing  Interventions/actions: skin interventions Continue to monitor     Will continue to monitor.

## 2020-02-05 NOTE — PROVIDER NOTIFICATION
Notified Surg/onc around 05:30 about patient HR of 121    Spoke with: N/A    Orders: None received at this time     Comments: Patient denies SOB, chest tightness. Comfortably relaxing. Continue to monitor

## 2020-02-05 NOTE — PLAN OF CARE
Physical Therapy Discharge Summary    Reason for therapy discharge:    Discharged to home.    Progress towards therapy goal(s). See goals on Care Plan in [facility] electronic health record for goal details.  Goals partially met.  Barriers to achieving goals:   discharge from facility.    Therapy recommendation(s):    Continued therapy is recommended.  Rationale/Recommendations:  Pt would benefit from additional skilled gait training to improve balance and safety with SPC.

## 2020-02-05 NOTE — PROGRESS NOTES
Surgery Progress Note    S:  Patient returned after discharge due to RUQ pain, low grade fever, and SOB. On admission, remarkable for leukocytosis and normal lactate. CT scan showed fluid collection around pancreatic head and duodenum. Pt reports that her RUQ pain is slightly better this morning, but still hurts when she moves around. Denies n/v, feeling chilly/feverish, or sweating. No changes in urination or BM or diet.     O:  Temp:  [99.7  F (37.6  C)-100.5  F (38.1  C)] 99.7  F (37.6  C)  Pulse:  [106-119] 112  Heart Rate:  [106-121] 121  Resp:  [14-30] 18  BP: (111-133)/(54-73) 111/54  SpO2:  [92 %-98 %] 95 %    I/O last 3 completed shifts:  In: 400 [P.O.:400]  Out: 2200 [Urine:2200]    Gen: A&Ox3  Resp: non-labored breathing on RA  Abd: Soft, Non-distended, tender to palpation in RUQ, No rebound tenderness.  Ext: warm and well perfused, pulses intact, ROM intact  Incisions: non-erythematous, non-tender    Labs:  Complete Blood Count   Recent Labs   Lab 02/05/20  0719 02/04/20  1616 02/04/20  0652 02/03/20  0655   WBC 27.6* 18.2* 10.8 9.8   HGB 9.9* 11.2* 9.8* 10.1*    533* 432 422     Basic Metabolic Panel  Recent Labs   Lab 02/04/20  1616 02/04/20  0652 02/03/20  0655 02/02/20  0802    140 138 139   POTASSIUM 3.4 3.2* 3.1* 4.0   CHLORIDE 99 106 104 105   CO2 27 31 31 30   BUN 5* 4* 3* 2*   CR 0.76 0.79 0.73 0.74   * 99 106* 143*     Liver Function Tests  Recent Labs   Lab 02/04/20  1616 01/30/20  0711   AST 29 36   ALT 31 28   ALKPHOS 283* 108   BILITOTAL 0.5 0.6   ALBUMIN 2.6* 2.4*     Pancreatic Enzymes  Recent Labs   Lab 02/04/20  1645 01/31/20  0719   LIPASE 37*  --    AMYLASE  --  13*     Coagulation Profile  No lab results found in last 7 days.       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  - Pain management: Dilaudid 0.2-0.4mg q2H  - check blood culture results  - Robaxin 500mg for motility  - Atarax 10mg TID PRN  - Transition levaquin and Flagyl to Cefepime/Flagyl/Micafungin  - Lovenox    Andrea Marquez, MS-3    I, Abdi Rodriguez MD, agree with the above documentation by student doctor Jimmy and have made any necessary edits to the note.    - Readmitted yesterday with worsening RUQ pain and SOB  - CT Ab/Pel with evidence of fluid collection near pancreas/duodenum with some slight rim enhancement, ? Abscess. Patient tender in RUQ, remainder of abdominal exam benign  - Drain site stitches removed yesterday, no drainage today  - CT PE negative in ED  - Worsening leukocytosis this AM to 27 from 18, though patient reports feeling improved. Afebrile. No diarrhea.  - Initially started on Levo/Flagyl, will broaden this AM to Cefepime/Flagyl and add Micafungin  - NPO for today, mIVF  - PO oxycodone and PRN IV dilaudid for pain    Abdi Rodriguez, PGY-3  General Surgery

## 2020-02-06 LAB
ALBUMIN SERPL-MCNC: 1.7 G/DL (ref 3.4–5)
ALP SERPL-CCNC: 200 U/L (ref 40–150)
ALT SERPL W P-5'-P-CCNC: 16 U/L (ref 0–50)
ANION GAP SERPL CALCULATED.3IONS-SCNC: 5 MMOL/L (ref 3–14)
AST SERPL W P-5'-P-CCNC: 12 U/L (ref 0–45)
BASOPHILS # BLD AUTO: 0.1 10E9/L (ref 0–0.2)
BASOPHILS NFR BLD AUTO: 0.3 %
BILIRUB DIRECT SERPL-MCNC: 0.1 MG/DL (ref 0–0.2)
BILIRUB SERPL-MCNC: 0.7 MG/DL (ref 0.2–1.3)
BUN SERPL-MCNC: 6 MG/DL (ref 7–30)
CALCIUM SERPL-MCNC: 8 MG/DL (ref 8.5–10.1)
CHLORIDE SERPL-SCNC: 105 MMOL/L (ref 94–109)
CO2 SERPL-SCNC: 27 MMOL/L (ref 20–32)
CREAT SERPL-MCNC: 0.71 MG/DL (ref 0.52–1.04)
DIFFERENTIAL METHOD BLD: ABNORMAL
EOSINOPHIL # BLD AUTO: 0.3 10E9/L (ref 0–0.7)
EOSINOPHIL NFR BLD AUTO: 1.2 %
ERYTHROCYTE [DISTWIDTH] IN BLOOD BY AUTOMATED COUNT: 14.2 % (ref 10–15)
GFR SERPL CREATININE-BSD FRML MDRD: >90 ML/MIN/{1.73_M2}
GLUCOSE BLDC GLUCOMTR-MCNC: 101 MG/DL (ref 70–99)
GLUCOSE SERPL-MCNC: 115 MG/DL (ref 70–99)
HCT VFR BLD AUTO: 28.4 % (ref 35–47)
HGB BLD-MCNC: 9.3 G/DL (ref 11.7–15.7)
IMM GRANULOCYTES # BLD: 0.3 10E9/L (ref 0–0.4)
IMM GRANULOCYTES NFR BLD: 1.7 %
LACTATE BLD-SCNC: 0.6 MMOL/L (ref 0.7–2)
LYMPHOCYTES # BLD AUTO: 1 10E9/L (ref 0.8–5.3)
LYMPHOCYTES NFR BLD AUTO: 5.1 %
MAGNESIUM SERPL-MCNC: 2 MG/DL (ref 1.6–2.3)
MCH RBC QN AUTO: 28.8 PG (ref 26.5–33)
MCHC RBC AUTO-ENTMCNC: 32.7 G/DL (ref 31.5–36.5)
MCV RBC AUTO: 88 FL (ref 78–100)
MONOCYTES # BLD AUTO: 1.3 10E9/L (ref 0–1.3)
MONOCYTES NFR BLD AUTO: 6.5 %
NEUTROPHILS # BLD AUTO: 17.3 10E9/L (ref 1.6–8.3)
NEUTROPHILS NFR BLD AUTO: 85.2 %
NRBC # BLD AUTO: 0 10*3/UL
NRBC BLD AUTO-RTO: 0 /100
PHOSPHATE SERPL-MCNC: 2.6 MG/DL (ref 2.5–4.5)
PLATELET # BLD AUTO: 440 10E9/L (ref 150–450)
POTASSIUM SERPL-SCNC: 3.2 MMOL/L (ref 3.4–5.3)
PROT SERPL-MCNC: 5.5 G/DL (ref 6.8–8.8)
RBC # BLD AUTO: 3.23 10E12/L (ref 3.8–5.2)
SODIUM SERPL-SCNC: 137 MMOL/L (ref 133–144)
WBC # BLD AUTO: 20.3 10E9/L (ref 4–11)

## 2020-02-06 PROCEDURE — 25800030 ZZH RX IP 258 OP 636: Performed by: STUDENT IN AN ORGANIZED HEALTH CARE EDUCATION/TRAINING PROGRAM

## 2020-02-06 PROCEDURE — 40000141 ZZH STATISTIC PERIPHERAL IV START W/O US GUIDANCE

## 2020-02-06 PROCEDURE — 36415 COLL VENOUS BLD VENIPUNCTURE: CPT | Performed by: SURGERY

## 2020-02-06 PROCEDURE — 83605 ASSAY OF LACTIC ACID: CPT | Performed by: SURGERY

## 2020-02-06 PROCEDURE — 25000128 H RX IP 250 OP 636: Performed by: SURGERY

## 2020-02-06 PROCEDURE — 25000128 H RX IP 250 OP 636: Performed by: STUDENT IN AN ORGANIZED HEALTH CARE EDUCATION/TRAINING PROGRAM

## 2020-02-06 PROCEDURE — 85025 COMPLETE CBC W/AUTO DIFF WBC: CPT | Performed by: SURGERY

## 2020-02-06 PROCEDURE — 25000132 ZZH RX MED GY IP 250 OP 250 PS 637: Mod: GY | Performed by: STUDENT IN AN ORGANIZED HEALTH CARE EDUCATION/TRAINING PROGRAM

## 2020-02-06 PROCEDURE — 80048 BASIC METABOLIC PNL TOTAL CA: CPT | Performed by: SURGERY

## 2020-02-06 PROCEDURE — 00000146 ZZHCL STATISTIC GLUCOSE BY METER IP

## 2020-02-06 PROCEDURE — 25000132 ZZH RX MED GY IP 250 OP 250 PS 637: Mod: GY | Performed by: SURGERY

## 2020-02-06 PROCEDURE — 12000001 ZZH R&B MED SURG/OB UMMC

## 2020-02-06 PROCEDURE — 83735 ASSAY OF MAGNESIUM: CPT | Performed by: SURGERY

## 2020-02-06 PROCEDURE — 84100 ASSAY OF PHOSPHORUS: CPT | Performed by: SURGERY

## 2020-02-06 PROCEDURE — 80076 HEPATIC FUNCTION PANEL: CPT | Performed by: SURGERY

## 2020-02-06 PROCEDURE — 87040 BLOOD CULTURE FOR BACTERIA: CPT | Performed by: SURGERY

## 2020-02-06 RX ORDER — OXYCODONE HYDROCHLORIDE 5 MG/1
10-15 TABLET ORAL EVERY 6 HOURS PRN
Status: DISCONTINUED | OUTPATIENT
Start: 2020-02-06 | End: 2020-02-15 | Stop reason: HOSPADM

## 2020-02-06 RX ADMIN — METHOCARBAMOL 500 MG: 500 TABLET, FILM COATED ORAL at 07:53

## 2020-02-06 RX ADMIN — OXYCODONE HYDROCHLORIDE 15 MG: 5 TABLET ORAL at 14:08

## 2020-02-06 RX ADMIN — PANTOPRAZOLE SODIUM 40 MG: 40 TABLET, DELAYED RELEASE ORAL at 07:52

## 2020-02-06 RX ADMIN — METRONIDAZOLE 500 MG: 500 INJECTION, SOLUTION INTRAVENOUS at 09:23

## 2020-02-06 RX ADMIN — METHOCARBAMOL 500 MG: 500 TABLET, FILM COATED ORAL at 15:42

## 2020-02-06 RX ADMIN — HYDROXYZINE HYDROCHLORIDE 10 MG: 10 TABLET ORAL at 21:10

## 2020-02-06 RX ADMIN — AMITRIPTYLINE HYDROCHLORIDE 75 MG: 75 TABLET, FILM COATED ORAL at 22:19

## 2020-02-06 RX ADMIN — HYDROXYZINE HYDROCHLORIDE 10 MG: 10 TABLET ORAL at 08:57

## 2020-02-06 RX ADMIN — POTASSIUM CHLORIDE, DEXTROSE MONOHYDRATE AND SODIUM CHLORIDE: 150; 5; 450 INJECTION, SOLUTION INTRAVENOUS at 14:41

## 2020-02-06 RX ADMIN — HYDROXYZINE HYDROCHLORIDE 10 MG: 10 TABLET ORAL at 15:42

## 2020-02-06 RX ADMIN — CEFEPIME HYDROCHLORIDE 2 G: 2 INJECTION, POWDER, FOR SOLUTION INTRAVENOUS at 10:59

## 2020-02-06 RX ADMIN — CITALOPRAM HYDROBROMIDE 20 MG: 20 TABLET ORAL at 07:53

## 2020-02-06 RX ADMIN — ENOXAPARIN SODIUM 40 MG: 40 INJECTION SUBCUTANEOUS at 09:56

## 2020-02-06 RX ADMIN — MICAFUNGIN SODIUM 100 MG: 10 INJECTION, POWDER, LYOPHILIZED, FOR SOLUTION INTRAVENOUS at 10:52

## 2020-02-06 RX ADMIN — Medication 0.4 MG: at 16:37

## 2020-02-06 RX ADMIN — METOCLOPRAMIDE HYDROCHLORIDE 10 MG: 10 TABLET ORAL at 12:12

## 2020-02-06 RX ADMIN — SENNOSIDES AND DOCUSATE SODIUM 1 TABLET: 8.6; 5 TABLET ORAL at 22:19

## 2020-02-06 RX ADMIN — OXYCODONE HYDROCHLORIDE 10 MG: 10 TABLET ORAL at 02:40

## 2020-02-06 RX ADMIN — METOCLOPRAMIDE HYDROCHLORIDE 10 MG: 10 TABLET ORAL at 15:42

## 2020-02-06 RX ADMIN — Medication 0.4 MG: at 12:20

## 2020-02-06 RX ADMIN — CEFEPIME HYDROCHLORIDE 2 G: 2 INJECTION, POWDER, FOR SOLUTION INTRAVENOUS at 22:23

## 2020-02-06 RX ADMIN — Medication 0.4 MG: at 09:39

## 2020-02-06 RX ADMIN — Medication 0.4 MG: at 21:17

## 2020-02-06 RX ADMIN — METRONIDAZOLE 500 MG: 500 INJECTION, SOLUTION INTRAVENOUS at 02:25

## 2020-02-06 RX ADMIN — METHOCARBAMOL 500 MG: 500 TABLET, FILM COATED ORAL at 19:59

## 2020-02-06 RX ADMIN — METRONIDAZOLE 500 MG: 500 INJECTION, SOLUTION INTRAVENOUS at 17:53

## 2020-02-06 RX ADMIN — OXYCODONE HYDROCHLORIDE 15 MG: 5 TABLET ORAL at 07:50

## 2020-02-06 RX ADMIN — METOCLOPRAMIDE HYDROCHLORIDE 10 MG: 10 TABLET ORAL at 22:19

## 2020-02-06 RX ADMIN — METOCLOPRAMIDE HYDROCHLORIDE 10 MG: 10 TABLET ORAL at 07:52

## 2020-02-06 RX ADMIN — OXYCODONE HYDROCHLORIDE 15 MG: 5 TABLET ORAL at 19:59

## 2020-02-06 RX ADMIN — DICLOFENAC EPOLAMINE 1 PATCH: 0.01 PATCH TOPICAL at 08:52

## 2020-02-06 RX ADMIN — METHOCARBAMOL 500 MG: 500 TABLET, FILM COATED ORAL at 12:12

## 2020-02-06 ASSESSMENT — PAIN DESCRIPTION - DESCRIPTORS
DESCRIPTORS: ACHING;SHARP
DESCRIPTORS: ACHING;SHARP
DESCRIPTORS: ACHING;CONSTANT;SORE
DESCRIPTORS: ACHING;SHARP
DESCRIPTORS: ACHING;CONSTANT;SORE
DESCRIPTORS: ACHING;CONSTANT;SORE
DESCRIPTORS: ACHING;SPASM
DESCRIPTORS: ACHING;CONSTANT;SORE
DESCRIPTORS: ACHING

## 2020-02-06 ASSESSMENT — ACTIVITIES OF DAILY LIVING (ADL)
ADLS_ACUITY_SCORE: 16

## 2020-02-06 NOTE — PLAN OF CARE
A&O. VSS except tachycardic. Triggered SIRS- lactic acid at 0.6. Oral oxycodone encouraged with only IV dilaudid for breakthrough pain, so far only prn oxycodone needed. Pt resting comfortably. PIV x 1 infusing MIVF. Spontaneously voiding, up to commode. No BM overnight. SBA. Abd incision with staples c/d/i. Old JAZZMINE site c/d/i. NPO- denies nausea. Continue with plan of care.

## 2020-02-06 NOTE — PROGRESS NOTES
Surgery Progress Note    S:  No acute events overnight. Continues to report RUQ pain, though improving. Some mild SOB with activity. No N/V. Passing flatus. UOP adequate.    O:  Temp:  [97.9  F (36.6  C)-100.3  F (37.9  C)] 97.9  F (36.6  C)  Heart Rate:  [103-118] 107  Resp:  [16-18] 18  BP: (110-129)/(55-64) 119/64  SpO2:  [92 %-96 %] 96 %    I/O last 3 completed shifts:  In: 1510 [P.O.:75; I.V.:1435]  Out: 3300 [Urine:3300]    Gen: A&Ox3  Resp: non-labored breathing on RA  Abd: Soft, Non-distended, mildly tender to palpation in RUQ, No rebound tenderness. Midline incision c/d/i. Previous drain sites well healed without drainage.  Ext: warm and well perfused, pulses intact, ROM intact  Incisions: non-erythematous, non-tender    Labs:  Complete Blood Count   Recent Labs   Lab 02/06/20  0718 02/05/20  0719 02/04/20  1616 02/04/20  0652   WBC 20.3* 27.6* 18.2* 10.8   HGB 9.3* 9.9* 11.2* 9.8*    444 533* 432     Basic Metabolic Panel  Recent Labs   Lab 02/06/20  0718 02/05/20  0719 02/04/20  1616 02/04/20  0652    134 133 140   POTASSIUM 3.2* 3.4 3.4 3.2*   CHLORIDE 105 102 99 106   CO2 27 25 27 31   BUN 6* 5* 5* 4*   CR 0.71 0.76 0.76 0.79   * 95 146* 99     Liver Function Tests  Recent Labs   Lab 02/06/20  0718 02/04/20  1616   AST 12 29   ALT 16 31   ALKPHOS 200* 283*   BILITOTAL 0.7 0.5   ALBUMIN 1.7* 2.6*     Pancreatic Enzymes  Recent Labs   Lab 02/04/20  1645 01/31/20  0719   LIPASE 37*  --    AMYLASE  --  13*     Coagulation Profile  No lab results found in last 7 days.       A/P: Nathalie Bashir is a 54 year old female readmitted after discharge and POD#9 s/p open whipple for what was thought to be a main duct IPMN, found to be fibrosis of pancreatic duct with surprising diagnosis of diffuse signet ring gastric adenocarcinoma of the antrum readmitted 2/4 with RUQ pain, tachycardia, and leukocytosis. CT Ab/Pel with evidence of fluid collection near pancreas/duodenum with some slight rim  enhancement, ? Abscess. Patient tender in RUQ, remainder of abdominal exam benign. Now with Ecoli bacteremia.  - Pain management: Oxycodone 10-15 mg q6h, PRN Dilaudid 0.2-0.4mg q2H  - Continue Cefepime/Flagyl/Micafungin  - Continue NPO for today, will decrease mIVF given increased UOP  - Atarax 10mg TID PRN, scheduled Robaxin  - Lovenox    Seen with chief who will discuss with staff    Abdi Rodriguez, PGY-3  General Surgery

## 2020-02-06 NOTE — PROGRESS NOTES
Antimicrobial Stewardship Team Note    Antimicrobial Stewardship Program - A joint venture between [facility] and  Physicians to optimize antibiotic management.  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     HPI: Upon ED representation, patient was found to have low grade fever, leukocytosis (18.2), normal lactic acid, RUQ pain, right-sided chest pain and asssociated SOB. CT abdomen on 2/4 with peripherally enhancing fluid collections surrounding the pancreatic head and duodenum extending inferiorly into the right lower abdominal quadrant, suggesting developing abscess. Blood cultures x 2 were drawn prior to receiving 1 dose empiric piperacillin/tazobactam in ED and was subsequently admitted. Upon admission, antimicrobials were transitioned to levofloxacin + metronidazole then broadened to cefepime, metronidazole, and micafungin on 2/5. Patient is currently receiving extended infusion cefepime over 4 hours. 1/2 blood cultures with GNR on gram stain, confirmed to be E. Coli without resistance markers after 12 hours of growth. Cultures and sensitives and repeat blood cultures are all pending. CXR on 2/4 showed streaky right basilar opacity,  favoring atelectasis over consolidation. Patient has been on room air throughout admission. Influenza A/B antigen negative. Urinalysis from 2/4 was non-inflammatory. Patient's leukocytosis peaked at WBC 27.6 on 2/5 which has decreased to 20.3 today. Patient has been afebrile.           Active Anti-infective Medications   (From admission, onward)                Start     Stop    02/05/20 1000  micafungin (MYCAMINE) injection  100 mg,   Intravenous,   100 mL/hr,   EVERY 24 HOURS     Candidiasis    intraabdominal infection from upper GI source        --    02/05/20 0900  ceFEPIme  2 g,   Intravenous,   EVERY 8 HOURS     Intra-Abdominal Infection        --    02/04/20 2300  metroNIDAZOLE  500 mg,   Intravenous,   EVERY 8 HOURS     Postoperative Infection        --          Assessment: [E. Coli bacteremia with suspected postoperative intraabdominal abscess source]. Patient has E. Coli bacteremia with suspected source being intrabdominal due to recent rocedure and imaging suggestive of developing abdominal abscess. Patient was recently diagnosed with adenocarcinoma that would increase risk of anastomotic complications. Despite monomicrobial bacteremia, it is difficult to assume that presumed intraabdominal source is also monomicrobial in the setting of recent surgery. Agree with empiric gram-negative, anaerobic, and antifungal coverage. Given Verigene shows no resistance markers, recommend de-escalation of cefepime to ceftriaxone. Patient lacks history of significant azole use or past growth of resistant fungus. Recommend deescalation of micafungin to fluconazole. With probable intraabdominal abscess, diagnostic and/or therapeutic drainage is imperative to allow for directed antimicrobial therapy and source control. Recommend pursuing options to drain abscess with bacterial and fungal cultures of fluid collected. Given bacteremia with potential for source control concerns, consider Infectious Diseases consult. Patient has  been tolerating oral medications and showing clinical improvement. Fluconazole has oral bioavailability of 90%. Metronidazole as oral bioavailability of 100%. Recommend IV to PO conversion for both fluconazole and metronidazole.     Recommendations:  Discontinue cefepime. Start ceftriaxone IV 2 g every 24 hours.   Discontinue micafungin. Start fluconazole  mg every 24 hours.   Continue metronidazole transition to oral--500 mg PO every 8 hours.   Obtain bacterial and fungal cultures of any drained intraabdominal abscess fluid.     Discussed with ID Staff, Ruthy Baldwin MD and ID pharmacy staff, Tessie Mcclure [facility]  Precious Gongora, PharmD IV Candidate    Vital Signs/Clinical Features:  Vitals         02/04 0700  -  02/05 0659 02/05 0700  -  02/06 0659 02/06 0700  -  02/06 1459   Most Recent    Temp ( F) 99.7 -  100.5    97.9 -  100.3      98.5     98.5 (36.9)    Pulse 106 -  119             Heart Rate 106 -  121    103 -  118      108     108    Resp 14 -  30    16 -  18      16     16    /54 -  133/65    110/55 -  129/63      129/64     129/64    SpO2 (%) 92 -  98    92 -  96      94     94            Labs  Estimated Creatinine Clearance: 107.5 mL/min (based on SCr of 0.71 mg/dL).  Recent Labs   Lab Test 02/02/20  0802 02/03/20  0655 02/04/20  0652 02/04/20  1616 02/05/20  0719 02/06/20  0718   CR 0.74 0.73 0.79 0.76 0.76 0.71       Recent Labs   Lab Test 02/02/20  0802 02/03/20  0655 02/04/20  0652 02/04/20  1616 02/05/20  0719 02/06/20  0718   WBC 9.8 9.8 10.8 18.2* 27.6* 20.3*   ANEU 7.1 7.2 8.3 15.9* 24.5* 17.3*   ALYM 1.2 1.3 1.2 1.1 1.1 1.0   LENIN 0.8 0.7 0.8 0.9 1.7* 1.3   AEOS 0.5 0.4 0.3 0.0 0.0 0.3   HGB 9.8* 10.1* 9.8* 11.2* 9.9* 9.3*   HCT 30.4* 31.1* 30.3* 33.9* 30.2* 28.4*   MCV 89 88 89 86 88 88    422 432 533* 444 440       Recent Labs   Lab Test 01/17/20  1115 01/28/20  1545 01/29/20  0701 01/30/20  0711 02/04/20  1616 02/06/20  0718   BILITOTAL 0.4 0.5 0.4 0.6 0.5 0.7   ALKPHOS 106  102 92 108 283* 200*   ALBUMIN 3.7 2.9* 2.8* 2.4* 2.6* 1.7*   AST 12 47* 35 36 29 12   ALT 17 39 33 28 31 16       Recent Labs   Lab Test 10/26/16  0900 11/02/16  1200 11/09/16  1100 11/23/16  0900 11/30/16  1130 12/07/16  1200 02/04/20  1616 02/04/20  1645   LACT  --   --   --   --   --   --  0.8 0.8   CRP 11.6* 9.6* 7.2 5.5 <2.9 14.3*  --   --              Culture Results:  7-Day Micro Results       Procedure Component Value Units Date/Time    Blood culture [] Collected:  02/06/20 0955    Order Status:  Completed Lab Status:  Preliminary result Updated:  02/06/20 1455    Specimen:  Blood      Specimen Description Blood Right Arm     Culture Micro No growth after 3 hours    Blood culture [] Collected:  02/06/20 0955    Order Status:  Completed Lab Status:  Preliminary result Updated:  02/06/20 1455    Specimen:  Blood      Specimen Description Blood Left Hand     Culture Micro No growth after 3 hours    Blood culture [[id number]]  (Abnormal) Collected:  02/04/20 1837    Order Status:  Completed Lab Status:  Preliminary result Updated:  02/06/20 1311    Specimen:  Blood from Arm, Left      Specimen Description Blood Left Arm     Culture Micro Cultured on the 1st day of incubation:  Escherichia coli        Critical Value/Significant Value, preliminary result only, called to and read back by  Cailin Marquez RN, 2.5.20. 0750. BS. JE.         Susceptibility testing in progress      (Note)  POSITIVE for E.COLI by Verigene multiplex nucleic acid test. Final  identification and antimicrobial susceptibility testing will be  verified by standard methods. Verigene test will not distinguish  E.coli from Shigella species including S.dysenteriae, S.flexneri,  S.boydii, and S.sonnei. Specimens containing Shigella species or  E.coli will be reported as Positive for E.coli.    Specimen tested with Verigene multiplex, gram-negative blood culture  nucleic acid test for the following targets: Acinetobacter sp.,  Citrobacter  sp., Enterobacter sp., Proteus sp., E. coli, K.  pneumoniae/oxytoca, P. aeruginosa, and the following resistance  markers: CTXM, KPC, NDM, VIM, IMP and OXA.    Critical Value/Significant Value called to and read back by FROYLAN VASQUEZ RN @[id number]/5/20. CT      Influenza A/B antigen [[id number]] Collected:  02/04/20 1705    Order Status:  Completed Lab Status:  Edited Result - FINAL Updated:  02/04/20 1745    Specimen:  Nasal      Influenza A/B Agn Specimen Nasal     Influenza A Negative     Influenza B Negative     Comment: Test results must be correlated with clinical data. If necessary, results   should be confirmed by a molecular assay or viral culture.         Blood culture [[id number]] Collected:  02/04/20 1645    Order Status:  Completed Lab Status:  Preliminary result Updated:  02/06/20 0409    Specimen:  Blood from Arm, Left      Specimen Description Blood Unspecified Site     Culture Micro No growth after 2 days    Amylase  fluid [[id number]] Collected:  01/31/20 0625    Order Status:  Completed Lab Status:  Final result Updated:  01/31/20 0713    Specimen:  Peritoneal Fluid      Amylase Fluid Source Peritoneal fluid     Amylase Fluid 56 U/L      Comment: No reference ranges have been established.  This result should be interpreted   in the context of the patient's clinical condition and compared to   simultaneous measurement in the patient's blood.  Refer to Lab Guide for   specific interpretive guidelines.         Amylase  fluid [[id number]] Collected:  01/31/20 0625    Order Status:  Completed Lab Status:  Final result Updated:  01/31/20 0713    Specimen:  Peritoneal Fluid      Amylase Fluid Source Peritoneal fluid     Amylase Fluid 38 U/L      Comment: No reference ranges have been established.  This result should be interpreted   in the context of the patient's clinical condition and compared to   simultaneous measurement in the patient's blood.  Refer to Lab Guide for   specific interpretive guidelines.                  Recent Labs   Lab Test 01/30/20  1700 02/04/20  1828   URINEPH 6.5 7.0   NITRITE Negative Negative   LEUKEST Trace* Negative   WBCU 5 2                         Imaging: Xr Chest 2 Views    Result Date: 2/4/2020  EXAM: XR CHEST 2 VW  2/4/2020 5:58 PM  HISTORY: SOB COMPARISON: None available FINDINGS: PA and lateral radiographs of the chest. Trachea is midline, heart size is normal. Streaky right basilar opacity. No pneumothorax or pleural effusion. No acute osseous or abdominal abnormality. Surgical clips projecting over the mid abdomen.     IMPRESSION: Streaky right basilar opacity, favor atelectasis over consolidation.  I have personally reviewed the examination and initial interpretation and I agree with the findings. CHAMP THOMASON MD    Ct Abdomen Pelvis W Contrast    Result Date: 2/4/2020  EXAMINATION: CT ABDOMEN PELVIS W CONTRAST, 2/4/2020 6:19 PM TECHNIQUE:  Helical CT images from the lung bases through the symphysis pubis were obtained with IV contrast. Contrast dose: iopamidol (ISOVUE-370) solution 122 mL COMPARISON: 12/18/2019 HISTORY: abdominal pain, recent whipple Additional history: Status post Whipple on 1/28/2020, discharged this morning. FINDINGS: LUNG BASES: Refer to separate CT chest same day. ABDOMEN/PELVIS: Status post gastric bypass and Whipple with trace postoperative pneumoperitoneum. Hazy stranding surrounding the duodenum and pancreatic head with adjacent, mildly peripherally, predominantly simple enhancing fluid collection extending to the right lower abdominal quadrant with adjacent inflammatory changes of the descending colon. The fluid collection extends medially surrounding the celiac and SMA. No extravasation of contrast noted. Trace free fluid surrounding the liver. Unremarkable hepatic parenchyma. Trace pneumobilia. Portal vasculature is widely patent. Spleen, adrenals, and kidneys are unremarkable. Ureters are normal along their course. Edematous, enhancing, and thickened wall  of the duodenum with downstream decompression. Air along the anterior margin of the duodenum is likely intraluminal. Colonic diverticulosis. Major abdominal vasculature is widely patent. Normal caliber abdominal aorta. Reactive abdominal mesenteric lymphadenopathy. No suspicious pelvic mass. Small amount of free fluid in the pelvis. No concerning inguinal or pelvic lymphadenopathy. Bladder is unremarkable. BONES: Significant appearance of the bones. No acute or suspicious bony abnormality. Status post left total hip arthroplasty and left sacroiliac fixation.     IMPRESSION: 1. Status post Whipple with peripherally enhancing, predominantly simple fluid collections surrounding the pancreatic head and duodenum extending inferiorly into the right lower abdominal quadrant, suggesting developing abscess. No evidence of active abdominal hemorrhage. 2. Significant inflammatory changes of the duodenum and ascending colon adjacent to the fluid collections, likely reactive. The air within the duodenum is most likely intraluminal and not pneumatosis since it is only visualized in the anterior portions of the duodenum. Close attention on follow-up. 3. Trace pneumoperitoneum and pneumobilia are likely postoperative. I have personally reviewed the examination and initial interpretation and I agree with the findings. CHAMP THOMASON MD    Ct Chest Pulmonary Embolism W Contrast    Result Date: 2/4/2020  Exam: Chest CT Angiogram with 3-dimensional reconstruction  2/4/2020. Comparison: 12/18/2019 Clinical History: Shortness of breath. Evaluate for pulmonary embolism. Technique: Volumetric helical acquisition of the chest were obtained following pulmonary embolism protocol from the lung apices through the upper abdomen. The pulmonary arteries are well-opacified to evaluate for pulmonary embolism. 3-dimensional post processed angiographic images were reconstructed, archived to PACS and used in the interpretation of this study. Findings:  Mediastinum/hilum: The heart size is normal. The great vessels are normal in caliber. There is no evidence of a filling defect in the pulmonary arteries to suggest a pulmonary embolism. No evidence of axillary, mediastinal or hilar lymphadenopathy by size criteria. No pericardial effusion is noted. Small pneumomediastinum. Lungs/pleura: Atelectasis in the posterior lower lobes. No consolidation or suspicious pulmonary nodule. Upper Abdomen: Partially visualized postoperative changes of Whipple's procedure with fatty stranding surrounding the pancreas. Mild ascites. Pneumobilia. Bones/Soft tissues: No suspicious bony lesions.     Impression: 1. No pulmonary embolism. 2. Small pneumomediastinum and pneumoperitoneum, likely postsurgical. 3. Partially visualized post surgical changes of Whipple's with a small amount of pneumoperitoneum, and fat stranding surrounding the pancreas. There are seen on same-day abdominal CT. I have personally reviewed the examination and initial interpretation and I agree with the findings. CHAMP THOMASON MD

## 2020-02-06 NOTE — PLAN OF CARE
Status: Re-admitted from ER for sharp abdominal pain, low-grade fever, and feeling of fullness post whipple procedure (1/28).  Vitals: Tachy (103, 115). T max: 100.1. RA.  Neuros: A&O x4, some lethargy noted this evening after last dose of 0.4 mg Dilaudid. Pt coughed up small amount of clear/blood tinged phlegm once.  IV: PIV infusing.  Resp/trach: LS clear & equal.   Diet: NPO.   Bowel status: No BM this shift.  : Voiding spontaneously in bedside commode.  Skin: Abdominal incision with staple, some erythema to sylvia-incision, YOSEF. Old JAZZMINE site with scabbing, otherwise CDI. Bruising on arms from old PIV's.  Pain: Pain controlled with IV dilaudid (0.4 mg) Q2H.  Activity: SBA.  Social:  visited this evening.  Plan: Pt expressed concern about roommates  staying the night while she is in a vulnerable state. She was offered to move to [facility] and is deciding whether she would like to move or stay. She has not requested to move again and is now asleep. Will continue to monitor and follow POC.

## 2020-02-06 NOTE — PLAN OF CARE
Second PIV started since IV ceFEPIme is extended dosing (over 4 hours). She has IV antibiotics and anti fungal meds plus MIVF LR  50 ml/hour. Blood cultures (2 sites) drawn ~ 10 am. C/O RUQ pain. Diclofenac patch in place (under R breast). Oxycodone po Q 6 hours --requests IV Dilaudid Q2 hours for break through pain. She voids spont--pivoting to the commode since walking activity increases her pain. Healing abd incision with staples intact. Old JAZZMINE sites are scabbed, no drain. On-going NPO. Denies nausea. Her son and grade-school grandson visited her today. She seems in good spirits. Bedside report: Yes, but do not wake me.

## 2020-02-07 ENCOUNTER — APPOINTMENT (OUTPATIENT)
Dept: GENERAL RADIOLOGY | Facility: CLINIC | Age: 55
DRG: 920 | End: 2020-02-07
Payer: MEDICARE

## 2020-02-07 LAB
ANION GAP SERPL CALCULATED.3IONS-SCNC: 4 MMOL/L (ref 3–14)
BACTERIA SPEC CULT: ABNORMAL
BASOPHILS # BLD AUTO: 0.1 10E9/L (ref 0–0.2)
BASOPHILS NFR BLD AUTO: 0.3 %
BUN SERPL-MCNC: 5 MG/DL (ref 7–30)
CALCIUM SERPL-MCNC: 8.2 MG/DL (ref 8.5–10.1)
CHLORIDE SERPL-SCNC: 106 MMOL/L (ref 94–109)
CO2 SERPL-SCNC: 28 MMOL/L (ref 20–32)
CREAT SERPL-MCNC: 0.72 MG/DL (ref 0.52–1.04)
DIFFERENTIAL METHOD BLD: ABNORMAL
EOSINOPHIL # BLD AUTO: 0.5 10E9/L (ref 0–0.7)
EOSINOPHIL NFR BLD AUTO: 3.2 %
ERYTHROCYTE [DISTWIDTH] IN BLOOD BY AUTOMATED COUNT: 14.4 % (ref 10–15)
GFR SERPL CREATININE-BSD FRML MDRD: >90 ML/MIN/{1.73_M2}
GLUCOSE BLDC GLUCOMTR-MCNC: 78 MG/DL (ref 70–99)
GLUCOSE SERPL-MCNC: 103 MG/DL (ref 70–99)
HCT VFR BLD AUTO: 30 % (ref 35–47)
HGB BLD-MCNC: 9.7 G/DL (ref 11.7–15.7)
IMM GRANULOCYTES # BLD: 0.2 10E9/L (ref 0–0.4)
IMM GRANULOCYTES NFR BLD: 1.3 %
LACTATE BLD-SCNC: 0.7 MMOL/L (ref 0.7–2)
LYMPHOCYTES # BLD AUTO: 1.4 10E9/L (ref 0.8–5.3)
LYMPHOCYTES NFR BLD AUTO: 9 %
MAGNESIUM SERPL-MCNC: 2.1 MG/DL (ref 1.6–2.3)
MCH RBC QN AUTO: 28 PG (ref 26.5–33)
MCHC RBC AUTO-ENTMCNC: 32.3 G/DL (ref 31.5–36.5)
MCV RBC AUTO: 87 FL (ref 78–100)
MONOCYTES # BLD AUTO: 1 10E9/L (ref 0–1.3)
MONOCYTES NFR BLD AUTO: 6.1 %
NEUTROPHILS # BLD AUTO: 12.5 10E9/L (ref 1.6–8.3)
NEUTROPHILS NFR BLD AUTO: 80.1 %
NRBC # BLD AUTO: 0 10*3/UL
NRBC BLD AUTO-RTO: 0 /100
PHOSPHATE SERPL-MCNC: 2.8 MG/DL (ref 2.5–4.5)
PLATELET # BLD AUTO: 515 10E9/L (ref 150–450)
POTASSIUM SERPL-SCNC: 3.3 MMOL/L (ref 3.4–5.3)
POTASSIUM SERPL-SCNC: 3.6 MMOL/L (ref 3.4–5.3)
RBC # BLD AUTO: 3.46 10E12/L (ref 3.8–5.2)
SODIUM SERPL-SCNC: 138 MMOL/L (ref 133–144)
SPECIMEN SOURCE: ABNORMAL
TRIGL SERPL-MCNC: 73 MG/DL
WBC # BLD AUTO: 15.6 10E9/L (ref 4–11)

## 2020-02-07 PROCEDURE — 83605 ASSAY OF LACTIC ACID: CPT | Performed by: SURGERY

## 2020-02-07 PROCEDURE — 25000128 H RX IP 250 OP 636: Performed by: STUDENT IN AN ORGANIZED HEALTH CARE EDUCATION/TRAINING PROGRAM

## 2020-02-07 PROCEDURE — 27211417 ZZ H KIT, VPS RHYTHM STYLET

## 2020-02-07 PROCEDURE — 40000986 XR CHEST PORT 1 VW

## 2020-02-07 PROCEDURE — 25000132 ZZH RX MED GY IP 250 OP 250 PS 637: Mod: GY | Performed by: STUDENT IN AN ORGANIZED HEALTH CARE EDUCATION/TRAINING PROGRAM

## 2020-02-07 PROCEDURE — 25000125 ZZHC RX 250: Performed by: SURGERY

## 2020-02-07 PROCEDURE — 83735 ASSAY OF MAGNESIUM: CPT | Performed by: SURGERY

## 2020-02-07 PROCEDURE — 84100 ASSAY OF PHOSPHORUS: CPT | Performed by: SURGERY

## 2020-02-07 PROCEDURE — 25000128 H RX IP 250 OP 636: Performed by: SURGERY

## 2020-02-07 PROCEDURE — 85025 COMPLETE CBC W/AUTO DIFF WBC: CPT | Performed by: SURGERY

## 2020-02-07 PROCEDURE — 36415 COLL VENOUS BLD VENIPUNCTURE: CPT | Performed by: SURGERY

## 2020-02-07 PROCEDURE — 36569 INSJ PICC 5 YR+ W/O IMAGING: CPT

## 2020-02-07 PROCEDURE — 27211389 ZZ H KIT, 5 FR DL BIOFLO OPEN ENDED PICC

## 2020-02-07 PROCEDURE — 40000141 ZZH STATISTIC PERIPHERAL IV START W/O US GUIDANCE

## 2020-02-07 PROCEDURE — 12000001 ZZH R&B MED SURG/OB UMMC

## 2020-02-07 PROCEDURE — 25800030 ZZH RX IP 258 OP 636: Performed by: STUDENT IN AN ORGANIZED HEALTH CARE EDUCATION/TRAINING PROGRAM

## 2020-02-07 PROCEDURE — 3E0436Z INTRODUCTION OF NUTRITIONAL SUBSTANCE INTO CENTRAL VEIN, PERCUTANEOUS APPROACH: ICD-10-PCS | Performed by: EMERGENCY MEDICINE

## 2020-02-07 PROCEDURE — 25000132 ZZH RX MED GY IP 250 OP 250 PS 637: Mod: GY | Performed by: SURGERY

## 2020-02-07 PROCEDURE — 00000146 ZZHCL STATISTIC GLUCOSE BY METER IP

## 2020-02-07 PROCEDURE — 80048 BASIC METABOLIC PNL TOTAL CA: CPT | Performed by: SURGERY

## 2020-02-07 PROCEDURE — 84132 ASSAY OF SERUM POTASSIUM: CPT | Performed by: SURGERY

## 2020-02-07 PROCEDURE — 84478 ASSAY OF TRIGLYCERIDES: CPT | Performed by: SURGERY

## 2020-02-07 RX ORDER — POTASSIUM CHLORIDE 750 MG/1
20-40 TABLET, EXTENDED RELEASE ORAL
Status: DISCONTINUED | OUTPATIENT
Start: 2020-02-07 | End: 2020-02-15 | Stop reason: HOSPADM

## 2020-02-07 RX ORDER — DEXTROSE MONOHYDRATE 100 MG/ML
INJECTION, SOLUTION INTRAVENOUS CONTINUOUS PRN
Status: DISCONTINUED | OUTPATIENT
Start: 2020-02-07 | End: 2020-02-15 | Stop reason: HOSPADM

## 2020-02-07 RX ORDER — POTASSIUM CHLORIDE 1.5 G/1.58G
20-40 POWDER, FOR SOLUTION ORAL
Status: DISCONTINUED | OUTPATIENT
Start: 2020-02-07 | End: 2020-02-15 | Stop reason: HOSPADM

## 2020-02-07 RX ORDER — HEPARIN SODIUM,PORCINE 10 UNIT/ML
2-5 VIAL (ML) INTRAVENOUS
Status: COMPLETED | OUTPATIENT
Start: 2020-02-07 | End: 2020-02-13

## 2020-02-07 RX ORDER — POTASSIUM CL/LIDO/0.9 % NACL 10MEQ/0.1L
10 INTRAVENOUS SOLUTION, PIGGYBACK (ML) INTRAVENOUS
Status: DISCONTINUED | OUTPATIENT
Start: 2020-02-07 | End: 2020-02-15 | Stop reason: HOSPADM

## 2020-02-07 RX ORDER — POTASSIUM CHLORIDE 7.45 MG/ML
10 INJECTION INTRAVENOUS
Status: DISPENSED | OUTPATIENT
Start: 2020-02-07 | End: 2020-02-07

## 2020-02-07 RX ORDER — POTASSIUM CHLORIDE 29.8 MG/ML
20 INJECTION INTRAVENOUS
Status: DISCONTINUED | OUTPATIENT
Start: 2020-02-07 | End: 2020-02-15 | Stop reason: HOSPADM

## 2020-02-07 RX ORDER — POTASSIUM CHLORIDE 7.45 MG/ML
10 INJECTION INTRAVENOUS
Status: DISCONTINUED | OUTPATIENT
Start: 2020-02-07 | End: 2020-02-15 | Stop reason: HOSPADM

## 2020-02-07 RX ORDER — MAGNESIUM SULFATE HEPTAHYDRATE 40 MG/ML
4 INJECTION, SOLUTION INTRAVENOUS EVERY 4 HOURS PRN
Status: DISCONTINUED | OUTPATIENT
Start: 2020-02-07 | End: 2020-02-15 | Stop reason: HOSPADM

## 2020-02-07 RX ORDER — LIDOCAINE 40 MG/G
CREAM TOPICAL
Status: DISCONTINUED | OUTPATIENT
Start: 2020-02-07 | End: 2020-02-15 | Stop reason: HOSPADM

## 2020-02-07 RX ADMIN — OXYCODONE HYDROCHLORIDE 15 MG: 5 TABLET ORAL at 02:21

## 2020-02-07 RX ADMIN — OXYCODONE HYDROCHLORIDE 15 MG: 5 TABLET ORAL at 20:42

## 2020-02-07 RX ADMIN — Medication 0.4 MG: at 16:26

## 2020-02-07 RX ADMIN — I.V. FAT EMULSION 250 ML: 20 EMULSION INTRAVENOUS at 20:06

## 2020-02-07 RX ADMIN — DICLOFENAC EPOLAMINE 1 PATCH: 0.01 PATCH TOPICAL at 07:45

## 2020-02-07 RX ADMIN — POTASSIUM CHLORIDE 10 MEQ: 7.46 INJECTION, SOLUTION INTRAVENOUS at 19:52

## 2020-02-07 RX ADMIN — METRONIDAZOLE 500 MG: 500 INJECTION, SOLUTION INTRAVENOUS at 18:43

## 2020-02-07 RX ADMIN — OXYCODONE HYDROCHLORIDE 15 MG: 5 TABLET ORAL at 08:41

## 2020-02-07 RX ADMIN — CEFEPIME HYDROCHLORIDE 2 G: 2 INJECTION, POWDER, FOR SOLUTION INTRAVENOUS at 22:05

## 2020-02-07 RX ADMIN — METOCLOPRAMIDE HYDROCHLORIDE 10 MG: 10 TABLET ORAL at 13:30

## 2020-02-07 RX ADMIN — METOCLOPRAMIDE HYDROCHLORIDE 10 MG: 10 TABLET ORAL at 07:45

## 2020-02-07 RX ADMIN — METOCLOPRAMIDE HYDROCHLORIDE 10 MG: 10 TABLET ORAL at 16:45

## 2020-02-07 RX ADMIN — LIDOCAINE HYDROCHLORIDE 3 ML: 10 INJECTION, SOLUTION EPIDURAL; INFILTRATION; INTRACAUDAL; PERINEURAL at 12:39

## 2020-02-07 RX ADMIN — POTASSIUM CHLORIDE 10 MEQ: 7.46 INJECTION, SOLUTION INTRAVENOUS at 16:50

## 2020-02-07 RX ADMIN — AMITRIPTYLINE HYDROCHLORIDE 75 MG: 75 TABLET, FILM COATED ORAL at 20:42

## 2020-02-07 RX ADMIN — METRONIDAZOLE 500 MG: 500 INJECTION, SOLUTION INTRAVENOUS at 02:48

## 2020-02-07 RX ADMIN — Medication 0.4 MG: at 18:33

## 2020-02-07 RX ADMIN — OXYCODONE HYDROCHLORIDE 15 MG: 5 TABLET ORAL at 14:34

## 2020-02-07 RX ADMIN — METOCLOPRAMIDE HYDROCHLORIDE 10 MG: 10 TABLET ORAL at 22:03

## 2020-02-07 RX ADMIN — PANTOPRAZOLE SODIUM 40 MG: 40 TABLET, DELAYED RELEASE ORAL at 07:45

## 2020-02-07 RX ADMIN — HYDROXYZINE HYDROCHLORIDE 10 MG: 10 TABLET ORAL at 07:57

## 2020-02-07 RX ADMIN — Medication 0.4 MG: at 00:35

## 2020-02-07 RX ADMIN — MICAFUNGIN SODIUM 100 MG: 10 INJECTION, POWDER, LYOPHILIZED, FOR SOLUTION INTRAVENOUS at 13:30

## 2020-02-07 RX ADMIN — SENNOSIDES AND DOCUSATE SODIUM 1 TABLET: 8.6; 5 TABLET ORAL at 22:03

## 2020-02-07 RX ADMIN — Medication 0.4 MG: at 11:39

## 2020-02-07 RX ADMIN — HYDROXYZINE HYDROCHLORIDE 10 MG: 10 TABLET ORAL at 15:26

## 2020-02-07 RX ADMIN — POTASSIUM CHLORIDE 10 MEQ: 7.46 INJECTION, SOLUTION INTRAVENOUS at 15:11

## 2020-02-07 RX ADMIN — POTASSIUM CHLORIDE: 2 INJECTION, SOLUTION, CONCENTRATE INTRAVENOUS at 20:05

## 2020-02-07 RX ADMIN — CEFEPIME HYDROCHLORIDE 2 G: 2 INJECTION, POWDER, FOR SOLUTION INTRAVENOUS at 10:44

## 2020-02-07 RX ADMIN — POTASSIUM CHLORIDE 10 MEQ: 7.46 INJECTION, SOLUTION INTRAVENOUS at 18:39

## 2020-02-07 RX ADMIN — ENOXAPARIN SODIUM 40 MG: 40 INJECTION SUBCUTANEOUS at 10:44

## 2020-02-07 RX ADMIN — METHOCARBAMOL 500 MG: 500 TABLET, FILM COATED ORAL at 13:30

## 2020-02-07 RX ADMIN — CITALOPRAM HYDROBROMIDE 20 MG: 20 TABLET ORAL at 07:45

## 2020-02-07 RX ADMIN — METHOCARBAMOL 500 MG: 500 TABLET, FILM COATED ORAL at 19:52

## 2020-02-07 RX ADMIN — METHOCARBAMOL 500 MG: 500 TABLET, FILM COATED ORAL at 16:45

## 2020-02-07 RX ADMIN — METRONIDAZOLE 500 MG: 500 INJECTION, SOLUTION INTRAVENOUS at 10:44

## 2020-02-07 RX ADMIN — METHOCARBAMOL 500 MG: 500 TABLET, FILM COATED ORAL at 07:45

## 2020-02-07 ASSESSMENT — ACTIVITIES OF DAILY LIVING (ADL)
ADLS_ACUITY_SCORE: 16

## 2020-02-07 ASSESSMENT — PAIN DESCRIPTION - DESCRIPTORS
DESCRIPTORS: ACHING
DESCRIPTORS: ACHING
DESCRIPTORS: SHARP;STABBING;ACHING
DESCRIPTORS: SHARP
DESCRIPTORS: ACHING;SHARP
DESCRIPTORS: ACHING;SHARP
DESCRIPTORS: DISCOMFORT

## 2020-02-07 NOTE — PROGRESS NOTES
"CLINICAL NUTRITION SERVICES - ASSESSMENT NOTE     Nutrition Prescription    RECOMMENDATIONS FOR MDs/PROVIDERS TO ORDER:  Monitor and replace lytes (K+, Mg++, Phos) as appropriate per protocol. No PRN replacement orders currently.     Fluid management per primary team. Recommend transition off of dextrose containing IVF with TPN initiation to reduce potential electrolyte abnormalities.     Malnutrition Status:    Non-severe malnutrition in the context of acute illness    Recommendations already ordered by Registered Dietitian (RD):  Parenteral Nutrition Change Order:   -- Access: Central (plan for PICC)  -- Use dosing weight 71 kg  -- Once central access obtained and  K+/Mg++/Phos WNL, begin TPN, goal volume 1200 ml/day (or per PharmD/team discretion) with initial 120 g Dex daily (408 kcal, GIR 1.17), 100 g AA daily (400 kcal), and 250 ml 20% IV lipids daily.  Micro/Rx: Per PharmD  --ONLY once pt receives ~100% of initial continuous PN volume with K+/Mg++/Phos WNL, advance PN dex by ~50 g every 1-2 days (pending lytes/Glu and Pharm D/RD discretion) to initial goal of 260 g Dex (884 kcal) to increase provisions to 1784 kcals (25 kcal/kg/day), 1.4 g PRO/kg/day, GIR 2.54 with 28% kcals from Fat.    Nutrition Related Labs: Triglyceride Level     Future/Additional Recommendations:  Monitor tolerance (lytes, BG, LFTs, renal labs, triglycerides) with TPN start. Monitor ability to advance dextrose.        REASON FOR ASSESSMENT  Nathalie Bashir is a/an 54 year old female assessed by the dietitian for Pharmacy/Nutrition to Start and Manage PN. Per Provider Order, \"Central Line NOT in place (Line has been ordered, but not yet inserted)\". Indication for Parenteral Nutrition: \"Ischemic Bowel, Proximal bowel anastomosis\".     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 Admitted 2/4 with RUQ pain, tachycardia, and leukocytosis. CT A/P with evidence of fluid collection near pancreas/duodenum. Now with Ecoli bacteremia. NPO for possible small bowel limb pneumatosis as it could be the source of E coli bacteremia.     NUTRITION HISTORY  Pt unknown to Clinical Nutrition PTA. Met with pt today who reports that at baseline she typically consumes 2 meals daily + snacks throughout day. Pt reports that she avoids gluten and MSG as she feels they exacerbate migraines. No known food allergies.     Since 1/28, pt reports PO intake has been low with post Whipple course. She reports that she started clear liquids on 2/1 and full liquids/post gastrectomy diet on 2/2. Between 2/2-2/4, pt was consuming 2 small meals daily.     CURRENT NUTRITION ORDERS  Diet: NPO as of 2/5 (previously Post Gastrectomy diet with Boost Plus between meals)    Intake/Tolerance: No intake documented since admission, has been NPO majority of admission.     GI: Per intake/output, no BM documented since admission. Abdomen noted to be soft and non-distended with mild tenderness in RUQ.     LABS  Labs reviewed  Na+ 138, K+ 3.3 (L) - 3 doses 10 mEq KCl ordered, Mg++ 2.1, Phos 2.8  Glucose Trends 103 <- 101 <- 115 <- 95  T Bili 0.7, Alk Phos 200 (H)   Ketones Urine 40 on 2/4    Renal:   BUN 5 (L) - can indicate low protein intake, Cr 0.72    MEDICATIONS  Medications reviewed  Reglan 10 mg QID  Bowel regimen   D5 in 0.45% NaCl + 20 mEq KCl @ 50 mL/hr (provides 60 g dex, 204 kcal in 1200 mL daily)    ANTHROPOMETRICS  Height: 172.7 cm (5' 8\")  Most Recent Weight: 89.8 kg (198 lb)    IBW: 64.2 kg (140% IBW)   BMI: Obesity Grade I BMI 30-34.9  Weight History:   Wt Readings from Last 10 Encounters:   02/04/20 89.8 kg (198 lb)   02/03/20 90.1 kg (198 lb 11.2 oz)   01/17/20 88.9 kg (196 lb)   01/17/20 88.9 kg (196 lb)   12/20/19 89.7 kg (197 lb 11.2 oz)   12/30/15 90.3 kg (199 lb)   12/17/15 92.3 kg (203 lb " 7.8 oz)   08/21/15 89.4 kg (197 lb)   04/07/15 89.1 kg (196 lb 6.9 oz)   12/23/14 89.3 kg (196 lb 13.9 oz)   Per Care Everywhere:  11/18/19          87.8 kg (193 lb 9.6 oz  10/01/19          89.8 kg (198 lb)  08/22/19          88.3 kg (194 lb 9.6 oz)  07/15/19          86.1 kg (189 lb 12.8 oz)  02/28/19          91.8 kg (202 lb 6.4 oz)    Over the past year, pt's weight has fluctuated between 87.8 - 91.8 kg. Since 2/28/19, pt has lost 2 kg (2% body weight), which is not considered clinically significant. Pt reports current weight is her UBW and denies unintentional weight loss.    Dosing Weight: 71 kg (adjusted) - based on lowest documented wt so far this adm (89.8 kg on 2/4) and IBW of 64.2 kg    ASSESSED NUTRITION NEEDS  Estimated Energy Needs: 1775 - 2130 kcals/day (25 - 30 kcals/kg)  Justification: Maintenance (lower end with TPN)  Estimated Protein Needs: 85 - 107 grams protein/day (1.2 - 1.5 grams of pro/kg)  Justification: Preservation of lean body mass and Increased needs 2/2 acute illness  Estimated Fluid Needs: 1 mL/kcal  Justification: Maintenance or Per provider pending fluid status    PHYSICAL FINDINGS  See malnutrition section below.    MALNUTRITION  % Intake: </= 50% for >/= 5 days (severe)  % Weight Loss: None noted  Subcutaneous Fat Loss: Facial region:  Mild  Muscle Loss: Facial & jaw region, Scapular bone:  Mild, Thoracic region (clavicle, acromium bone, deltoid, trapezius, pectoral), Upper arm (bicep, tricep), Lower arm  (forearm), Dorsal hand:  Moderate and Posterior calf:  Mild  Fluid Accumulation/Edema: None noted  Malnutrition Diagnosis: Non-severe malnutrition in the context of acute illness    NUTRITION DIAGNOSIS  Inadequate oral intake related to complicated post-op course and now with altered GI status (question of pneumatosis of the reconstruction small bowel limb) and NPO status inhibiting ability to meet nutrition needs PO as evidenced by presence of urinary ketones on admission, pt  reports of minimal PO intake over past 10 days since ipple, and need for TPN to meet nutrition needs at this time.       INTERVENTIONS   Implementation  Nutrition Education: Provided education on role of RD and NFPE. Discussed nutrition POC and plan for TPN initiation. Pt denies nutrition related questions/concerns at this time.      Parenteral Nutrition/IV Fluids - Initiate pending PICC placement for central access.    Collaboration with Providers - discussed nutrition POC with surgery team; they anticipate pt will need to be NPO for at least the next week in setting of GI status. Team okay with continuing maintenance fluid provisions. Discussed TPN plan with PharmD.    Goals  Total avg nutritional intake to meet a minimum of 25 kcal/kg and 1.5 g PRO/kg daily (per dosing wt 71 kg).     Monitoring/Evaluation  Progress toward goals will be monitored and evaluated per protocol.    Jeffrey Soria MS, RD, LD  7C floor pgr: [phone]

## 2020-02-07 NOTE — TELEPHONE ENCOUNTER
ONCOLOGY INTAKE: Records Information      APPT INFORMATION: 2/27/20 - [place] - [facility]  Referring provider:  Mohamud  Referring provider s clinic:  GI  Reason for visit/diagnosis:  gastric cancer  Has patient been notified of appointment date and time?: Yes    RECORDS INFORMATION:  Were the records received with the referral (via [employer])? Internal referral    Has patient been seen for any external appt for this diagnosis? Yes    [facility]    Has patient had any imaging or procedures outside of [facility] for this condition? Yes      If Yes, where? No [facility]    ADDITIONAL INFORMATION:  Scheduled via IB from [place]  Pt confirmed

## 2020-02-07 NOTE — PLAN OF CARE
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 readmitted 2/4 with RUQ pain, tachycardia, and leukocytosis per MD note    Pain: pt abdominal pain managed with scheduled robaxin, flector patch, PRN oxycodone, and PRN dilaudid   Nausea: denies  Lab: Potassium 3.3, replacement to be completed once abx complete   /69 (BP Location: Left arm)   Pulse 101   Temp 98.2  F (36.8  C) (Oral)   Resp 16   Ht 1.727 m (5' 8\")   Wt 89.8 kg (198 lb)   LMP 09/23/2014   SpO2 93%   BMI 30.11 kg/m   - tachy, team aware   Output: Adequately voiding   Diet: NPO   Activity: SBA + cane   Bowel Function: Bowel sounds head in all quadrants, passing flatus, small bm this shift per pt report   Lines: L PIV x 2, infusing abx, dressing CDI. PICC placed- double lumen   Incision: Staples, erythema surrounding incision + scabbing. Previous JAZZMINE sites scabbing as well   Additional notes: Atarax given for pt anxiety   Plan: Continue to monitor pain, nausea, labs, VS, output, and bowel function   "

## 2020-02-07 NOTE — PROGRESS NOTES
Surgery Progress Note    S:  No acute events overnight. Reports sleeping well. Continues to have RUQ pain exacerbated when moving, but pain is well controlled. Denies n/v, CP, SOB, fevers. Reports having mild acid reflux. No changes in urination or BM. Passing flatus. Adequate UOP.       O:  Temp:  [97.1  F (36.2  C)-99.3  F (37.4  C)] 98.1  F (36.7  C)  Pulse:  [101] 101  Heart Rate:  [102-108] 102  Resp:  [16] 16  BP: (119-130)/(57-68) 121/67  SpO2:  [92 %-97 %] 97 %    I/O last 3 completed shifts:  In: 1905 [P.O.:200; I.V.:1705]  Out: 2650 [Urine:2650]    Gen: A&Ox3  Resp: non-labored breathing on RA  Abd: Soft, Non-distended, mildly tender to palpation in RUQ, No rebound tenderness. Midline incision c/d/i. Previous drain sites well healed without drainage.  Ext: warm and well perfused, pulses intact, ROM intact  Incisions: non-erythematous, non-tender    Labs:  Complete Blood Count   Recent Labs   Lab 02/06/20  0718 02/05/20  0719 02/04/20  1616 02/04/20  0652   WBC 20.3* 27.6* 18.2* 10.8   HGB 9.3* 9.9* 11.2* 9.8*    444 533* 432     Basic Metabolic Panel  Recent Labs   Lab 02/06/20  0718 02/05/20  0719 02/04/20  1616 02/04/20  0652    134 133 140   POTASSIUM 3.2* 3.4 3.4 3.2*   CHLORIDE 105 102 99 106   CO2 27 25 27 31   BUN 6* 5* 5* 4*   CR 0.71 0.76 0.76 0.79   * 95 146* 99     Liver Function Tests  Recent Labs   Lab 02/06/20  0718 02/04/20  1616   AST 12 29   ALT 16 31   ALKPHOS 200* 283*   BILITOTAL 0.7 0.5   ALBUMIN 1.7* 2.6*     Pancreatic Enzymes  Recent Labs   Lab 02/04/20  1645 01/31/20  0719   LIPASE 37*  --    AMYLASE  --  13*     Coagulation Profile  No lab results found in last 7 days.       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 CT Ab/Pel with evidence of fluid collection near pancreas/duodenum with some slight rim enhancement, ? Abscess. Patient tender in RUQ, remainder of abdominal exam benign. Now with Ecoli bacteremia.  - Pain management: Oxycodone 10-15 mg q6h, PRN Dilaudid 0.2-0.4mg q2H  - Continue IV Cefepime/Flagyl/Micafungin  - Continue NPO with mIVF at 50ml/hr  - Atarax 10mg TID PRN, scheduled Robaxin  - Lovenox    Andrea Marquez, MS-3    RESIDENT ADDENDUM    I have personally seen, evaluated and examined the patient. I agree with the medical student note above.    No issues overnight. Pain controlled. Denies n/v, cp, sob. No fevers/chills. Voiding independently. Passing flatus. Last BM yesterday. 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 CT a/p with evidence of fluid collection near pancreas/duodenum with some slight rim enhancement, and question of pneumatosis of the reconstruction small bowel limb. Patient tender in RUQ, remainder of abdominal exam benign. Now with Ecoli bacteremia.    Patient made NPO for possible small bowel limb pneumatosis as it could be the source of E coli bacteremia. There is no plan to transition to PO antibiotics. Also, there is no plan to narrow antibiotics at this time as she did not respond (worsening leukocytosis, fevers) to levaquin or zosyn on admission even though E coli is supposedly sensitive to them.    -NPO  -acute on chronic pain: pnr oxy, prn dilaudid, margoth " robaxin, prn atarax  -mIVF  -PICC/TPN today  -IV cefepime/flagyl/micafungin  -following fever curve and WBC trend  -ambulate  -lovenox    Doe Main MD PGY-5  Surgery Resident  Pg [phone]

## 2020-02-07 NOTE — PROCEDURES
[facility], [place]    Double Lumen PICC Placement  Date/Time: 2/7/2020 12:53 PM  Performed by: Precious Torres LPN  Authorized by: Doe Main MD   Indications: antibiotics.    UNIVERSAL PROTOCOL   Site Marked: Yes  Prior Images Obtained and Reviewed:  Yes  Required items: Required blood products, implants, devices and special equipment available    Patient identity confirmed:  Verbally with patient and arm band  NA - No sedation, light sedation, or local anesthesia  Confirmation Checklist:  Patient's identity using two indicators, relevant allergies, procedure was appropriate and matched the consent or emergent situation and correct equipment/implants were available  Time out: Immediately prior to the procedure a time out was called    Universal Protocol: the Joint Commission Universal Protocol was followed    Preparation: Patient was prepped and draped in usual sterile fashion           ANESTHESIA    Anesthesia: See MAR for details  Local Anesthetic:  Lidocaine 1% without epinephrine  Anesthetic Total (mL):  2      SEDATION    Patient Sedated: No        Preparation: skin prepped with ChloraPrep  Skin prep agent: skin prep agent completely dried prior to procedure  Sterile barriers: maximum sterile barriers were used: cap, mask, sterile gown, sterile gloves, and large sterile sheet  Hand hygiene: hand hygiene performed prior to central venous catheter insertion  Type of line used: Power PICC  Catheter type: double lumen  Lumen type: valved  Catheter size: 5 Fr  Brand: other (see comment) (bioflo)  Placement method: venipuncture, MST, ultrasound and tip confirmation system  Number of attempts: 1  Successful placement: yes  Orientation: right  Location: brachial vein (medial) (.41 cm)  Arm circumference: adults 10 cm  Extremity circumference: 26.5  Visible catheter length: 2  Total catheter length: 43  Dressing and securement: chlorhexidine patch applied, securement device, site  cleaned, statlock and sterile dressing applied  Post procedure assessment: blood return through all ports and free fluid flow  PROCEDURE   Patient Tolerance:  Patient tolerated the procedure well with no immediate complications

## 2020-02-07 NOTE — PLAN OF CARE
A&O. VSS except tachycardic. Triggered SIRS- lactic acid at 0.7. Oral oxycodone given with IV dilaudid for breakthrough pain. PIV x 1 infusing MIVF, other PIV infusing TKO between antibiotics. Spontaneously voiding, up to commode. No BM overnight. SBA. Abd incision with staples c/d/i. Old JAZZMINE site c/d/i. NPO- denies nausea. Continue with plan of care.

## 2020-02-07 NOTE — PLAN OF CARE
Slightly tachycardic, OVSS on RA. A+OX4. Pain controlled with prn oxy, IV dilaudid, robaxin, atarax. Denies nausea. NPO, ice chips. Incision with staples c/d/I. Passing gas, no BM, bowel meds given. Voiding good amounts. Old JAZZMINE sites scabbed over. UAL, steady on feet.     Continue POC.

## 2020-02-08 LAB
ALBUMIN SERPL-MCNC: 2 G/DL (ref 3.4–5)
ALP SERPL-CCNC: 186 U/L (ref 40–150)
ALT SERPL W P-5'-P-CCNC: 13 U/L (ref 0–50)
ANION GAP SERPL CALCULATED.3IONS-SCNC: 4 MMOL/L (ref 3–14)
AST SERPL W P-5'-P-CCNC: 18 U/L (ref 0–45)
BASOPHILS # BLD AUTO: 0.1 10E9/L (ref 0–0.2)
BASOPHILS NFR BLD AUTO: 0.4 %
BILIRUB DIRECT SERPL-MCNC: <0.1 MG/DL (ref 0–0.2)
BILIRUB SERPL-MCNC: 0.3 MG/DL (ref 0.2–1.3)
BUN SERPL-MCNC: 8 MG/DL (ref 7–30)
CALCIUM SERPL-MCNC: 8.3 MG/DL (ref 8.5–10.1)
CHLORIDE SERPL-SCNC: 104 MMOL/L (ref 94–109)
CO2 SERPL-SCNC: 28 MMOL/L (ref 20–32)
CREAT SERPL-MCNC: 0.64 MG/DL (ref 0.52–1.04)
DIFFERENTIAL METHOD BLD: ABNORMAL
EOSINOPHIL # BLD AUTO: 0.5 10E9/L (ref 0–0.7)
EOSINOPHIL NFR BLD AUTO: 2.7 %
ERYTHROCYTE [DISTWIDTH] IN BLOOD BY AUTOMATED COUNT: 14.6 % (ref 10–15)
GFR SERPL CREATININE-BSD FRML MDRD: >90 ML/MIN/{1.73_M2}
GLUCOSE BLDC GLUCOMTR-MCNC: 102 MG/DL (ref 70–99)
GLUCOSE BLDC GLUCOMTR-MCNC: 120 MG/DL (ref 70–99)
GLUCOSE BLDC GLUCOMTR-MCNC: 96 MG/DL (ref 70–99)
GLUCOSE SERPL-MCNC: 137 MG/DL (ref 70–99)
HCT VFR BLD AUTO: 30.9 % (ref 35–47)
HGB BLD-MCNC: 10 G/DL (ref 11.7–15.7)
IMM GRANULOCYTES # BLD: 0.3 10E9/L (ref 0–0.4)
IMM GRANULOCYTES NFR BLD: 1.8 %
INR PPP: 1.27 (ref 0.86–1.14)
LACTATE BLD-SCNC: 0.5 MMOL/L (ref 0.7–2)
LYMPHOCYTES # BLD AUTO: 1.4 10E9/L (ref 0.8–5.3)
LYMPHOCYTES NFR BLD AUTO: 7.8 %
MAGNESIUM SERPL-MCNC: 2.1 MG/DL (ref 1.6–2.3)
MCH RBC QN AUTO: 28.3 PG (ref 26.5–33)
MCHC RBC AUTO-ENTMCNC: 32.4 G/DL (ref 31.5–36.5)
MCV RBC AUTO: 88 FL (ref 78–100)
MONOCYTES # BLD AUTO: 0.9 10E9/L (ref 0–1.3)
MONOCYTES NFR BLD AUTO: 5.2 %
NEUTROPHILS # BLD AUTO: 14.4 10E9/L (ref 1.6–8.3)
NEUTROPHILS NFR BLD AUTO: 82.1 %
NRBC # BLD AUTO: 0 10*3/UL
NRBC BLD AUTO-RTO: 0 /100
PHOSPHATE SERPL-MCNC: 2.7 MG/DL (ref 2.5–4.5)
PLATELET # BLD AUTO: 503 10E9/L (ref 150–450)
POTASSIUM SERPL-SCNC: 4.9 MMOL/L (ref 3.4–5.3)
PROT SERPL-MCNC: 6.2 G/DL (ref 6.8–8.8)
RBC # BLD AUTO: 3.53 10E12/L (ref 3.8–5.2)
SODIUM SERPL-SCNC: 136 MMOL/L (ref 133–144)
WBC # BLD AUTO: 17.6 10E9/L (ref 4–11)

## 2020-02-08 PROCEDURE — 00000146 ZZHCL STATISTIC GLUCOSE BY METER IP

## 2020-02-08 PROCEDURE — 36592 COLLECT BLOOD FROM PICC: CPT | Performed by: SURGERY

## 2020-02-08 PROCEDURE — 82248 BILIRUBIN DIRECT: CPT | Performed by: SURGERY

## 2020-02-08 PROCEDURE — 12000001 ZZH R&B MED SURG/OB UMMC

## 2020-02-08 PROCEDURE — 25000128 H RX IP 250 OP 636: Performed by: SURGERY

## 2020-02-08 PROCEDURE — 83735 ASSAY OF MAGNESIUM: CPT | Performed by: SURGERY

## 2020-02-08 PROCEDURE — 83605 ASSAY OF LACTIC ACID: CPT | Performed by: SURGERY

## 2020-02-08 PROCEDURE — 25000132 ZZH RX MED GY IP 250 OP 250 PS 637: Mod: GY | Performed by: SURGERY

## 2020-02-08 PROCEDURE — 25000132 ZZH RX MED GY IP 250 OP 250 PS 637: Mod: GY | Performed by: STUDENT IN AN ORGANIZED HEALTH CARE EDUCATION/TRAINING PROGRAM

## 2020-02-08 PROCEDURE — 80053 COMPREHEN METABOLIC PANEL: CPT | Performed by: SURGERY

## 2020-02-08 PROCEDURE — 25800030 ZZH RX IP 258 OP 636: Performed by: STUDENT IN AN ORGANIZED HEALTH CARE EDUCATION/TRAINING PROGRAM

## 2020-02-08 PROCEDURE — 36415 COLL VENOUS BLD VENIPUNCTURE: CPT | Performed by: SURGERY

## 2020-02-08 PROCEDURE — 25000128 H RX IP 250 OP 636: Performed by: STUDENT IN AN ORGANIZED HEALTH CARE EDUCATION/TRAINING PROGRAM

## 2020-02-08 PROCEDURE — 84100 ASSAY OF PHOSPHORUS: CPT | Performed by: SURGERY

## 2020-02-08 PROCEDURE — 85025 COMPLETE CBC W/AUTO DIFF WBC: CPT | Performed by: SURGERY

## 2020-02-08 PROCEDURE — 25000125 ZZHC RX 250: Performed by: SURGERY

## 2020-02-08 PROCEDURE — 85610 PROTHROMBIN TIME: CPT | Performed by: SURGERY

## 2020-02-08 PROCEDURE — 84134 ASSAY OF PREALBUMIN: CPT | Performed by: SURGERY

## 2020-02-08 RX ORDER — SODIUM CHLORIDE 9 MG/ML
INJECTION, SOLUTION INTRAVENOUS CONTINUOUS
Status: DISCONTINUED | OUTPATIENT
Start: 2020-02-08 | End: 2020-02-13

## 2020-02-08 RX ADMIN — POTASSIUM CHLORIDE: 2 INJECTION, SOLUTION, CONCENTRATE INTRAVENOUS at 20:11

## 2020-02-08 RX ADMIN — METOCLOPRAMIDE HYDROCHLORIDE 10 MG: 10 TABLET ORAL at 12:15

## 2020-02-08 RX ADMIN — METHOCARBAMOL 500 MG: 500 TABLET, FILM COATED ORAL at 07:47

## 2020-02-08 RX ADMIN — OXYCODONE HYDROCHLORIDE 15 MG: 5 TABLET ORAL at 19:55

## 2020-02-08 RX ADMIN — SODIUM CHLORIDE 1000 ML: 9 INJECTION, SOLUTION INTRAVENOUS at 18:28

## 2020-02-08 RX ADMIN — METHOCARBAMOL 500 MG: 500 TABLET, FILM COATED ORAL at 16:12

## 2020-02-08 RX ADMIN — METRONIDAZOLE 500 MG: 500 INJECTION, SOLUTION INTRAVENOUS at 18:28

## 2020-02-08 RX ADMIN — METHOCARBAMOL 500 MG: 500 TABLET, FILM COATED ORAL at 19:54

## 2020-02-08 RX ADMIN — HYDROXYZINE HYDROCHLORIDE 10 MG: 10 TABLET ORAL at 08:53

## 2020-02-08 RX ADMIN — I.V. FAT EMULSION 250 ML: 20 EMULSION INTRAVENOUS at 20:10

## 2020-02-08 RX ADMIN — SENNOSIDES AND DOCUSATE SODIUM 1 TABLET: 8.6; 5 TABLET ORAL at 22:19

## 2020-02-08 RX ADMIN — Medication 0.2 MG: at 10:14

## 2020-02-08 RX ADMIN — METOCLOPRAMIDE HYDROCHLORIDE 10 MG: 10 TABLET ORAL at 16:12

## 2020-02-08 RX ADMIN — Medication 0.2 MG: at 15:44

## 2020-02-08 RX ADMIN — AMITRIPTYLINE HYDROCHLORIDE 75 MG: 75 TABLET, FILM COATED ORAL at 22:19

## 2020-02-08 RX ADMIN — PANTOPRAZOLE SODIUM 40 MG: 40 TABLET, DELAYED RELEASE ORAL at 07:47

## 2020-02-08 RX ADMIN — OXYCODONE HYDROCHLORIDE 15 MG: 5 TABLET ORAL at 13:49

## 2020-02-08 RX ADMIN — METRONIDAZOLE 500 MG: 500 INJECTION, SOLUTION INTRAVENOUS at 10:20

## 2020-02-08 RX ADMIN — ENOXAPARIN SODIUM 40 MG: 40 INJECTION SUBCUTANEOUS at 10:27

## 2020-02-08 RX ADMIN — Medication 0.2 MG: at 06:01

## 2020-02-08 RX ADMIN — Medication 0.2 MG: at 18:38

## 2020-02-08 RX ADMIN — SODIUM CHLORIDE 1000 ML: 9 INJECTION, SOLUTION INTRAVENOUS at 08:03

## 2020-02-08 RX ADMIN — CITALOPRAM HYDROBROMIDE 20 MG: 20 TABLET ORAL at 07:47

## 2020-02-08 RX ADMIN — METHOCARBAMOL 500 MG: 500 TABLET, FILM COATED ORAL at 12:15

## 2020-02-08 RX ADMIN — CEFEPIME HYDROCHLORIDE 2 G: 2 INJECTION, POWDER, FOR SOLUTION INTRAVENOUS at 22:19

## 2020-02-08 RX ADMIN — METRONIDAZOLE 500 MG: 500 INJECTION, SOLUTION INTRAVENOUS at 02:14

## 2020-02-08 RX ADMIN — Medication 0.2 MG: at 01:15

## 2020-02-08 RX ADMIN — MICAFUNGIN SODIUM 100 MG: 10 INJECTION, POWDER, LYOPHILIZED, FOR SOLUTION INTRAVENOUS at 13:00

## 2020-02-08 RX ADMIN — Medication 0.2 MG: at 22:19

## 2020-02-08 RX ADMIN — OXYCODONE HYDROCHLORIDE 15 MG: 5 TABLET ORAL at 07:47

## 2020-02-08 RX ADMIN — Medication 0.2 MG: at 12:19

## 2020-02-08 RX ADMIN — METOCLOPRAMIDE HYDROCHLORIDE 10 MG: 10 TABLET ORAL at 07:47

## 2020-02-08 RX ADMIN — DICLOFENAC EPOLAMINE 1 PATCH: 0.01 PATCH TOPICAL at 07:53

## 2020-02-08 RX ADMIN — METOCLOPRAMIDE HYDROCHLORIDE 10 MG: 10 TABLET ORAL at 22:19

## 2020-02-08 RX ADMIN — CEFEPIME HYDROCHLORIDE 2 G: 2 INJECTION, POWDER, FOR SOLUTION INTRAVENOUS at 10:22

## 2020-02-08 ASSESSMENT — ACTIVITIES OF DAILY LIVING (ADL)
ADLS_ACUITY_SCORE: 16
ADLS_ACUITY_SCORE: 14

## 2020-02-08 ASSESSMENT — PAIN DESCRIPTION - DESCRIPTORS
DESCRIPTORS: ACHING;SHARP
DESCRIPTORS: ACHING
DESCRIPTORS: DISCOMFORT

## 2020-02-08 ASSESSMENT — MIFFLIN-ST. JEOR: SCORE: 1514.88

## 2020-02-08 NOTE — PLAN OF CARE
"Blood pressure 131/74, pulse 101, temperature 98.2  F (36.8  C), temperature source Axillary, resp. rate 16, height 1.727 m (5' 8\"), weight 89.8 kg (198 lb), last menstrual period 09/23/2014, SpO2 95 %, not currently breastfeeding.    Assumed care, 23:00 to 07:30. Pt is A/O x 3, No acute event overnight. C/O abdominal incisional pain. 0.2 mg of IV Dilaudid given prn. TPN/IL infusing. BG at 01:00 was 96. NPO except ice chips and meds. Left PIV NS locked. Right PICC with TPN/IL via gray port and MIVF and Antibiotics via purple port. Up to commode with assist of 1 and went to the bathroom x1 with assist for BM but only passed some flatus. Denies N/V. Abdominal sylvia incisional line erythema/pink. Gets antibiotic Cefepime over 4 hours. HR continue to be Tachycardic and she triggered SIRS protocol and Lactate for Sepsis was 0.5. Will continue to monitor pt post op and increase activity level, continue to assess pain level and treat. Continue with plan of care.    Problem: Adult Inpatient Plan of Care  Goal: Plan of Care Review  2/8/2020 0619 by Anastasia Reynoso RN  Outcome: No Change  2/7/2020 2248 by Paulette Harvey, RN  Outcome: No Change     Problem: Adult Inpatient Plan of Care  Goal: Optimal Comfort and Wellbeing  2/8/2020 0619 by Anastasia Reynoso RN  Outcome: No Change  2/7/2020 2248 by Paulette Harvey, RN  Outcome: No Change     Problem: Pain Acute  Goal: Optimal Pain Control  2/8/2020 0619 by Anastasia Reynoso, RN  Outcome: No Change  2/7/2020 2248 by Paulette Harvey, RN  Outcome: No Change     "

## 2020-02-08 NOTE — PROGRESS NOTES
"SURG ONC NOTE    Pt reports she is feeling ok. Does have some RUQ pain.     /74 (BP Location: Left arm)   Pulse 101   Temp 98.2  F (36.8  C) (Axillary)   Resp 16   Ht 1.727 m (5' 8\")   Wt 89.8 kg (198 lb)   LMP 09/23/2014   SpO2 95%   BMI 30.11 kg/m    NAD  NLB   Abd soft, nondist, ttp RUQ    WBC 17.6 < 15.6    A/P: POD11 s/p Whipple c/b intraabd fluid collection, E coli bacteremia, and ?SB pneumatosis.  -NPO/TPN - TPN + IVF = 125 ml/hr  -Uptrending WBC but afebrile, lactate normal - will continue to trend  -IV cefepime, flagyl, micafungin  -Ppx: ambulate, IS, lovenox daily    January Mckeon MD MPH (PGY7)  Surgery      " Parent/Legal Guardian Access to the Online AdAdapted Record of a Patient 15to 16Years Old  Return completed form by Secure email to [place] [facility]/Medical Records Department: [email or web address].     Requirements and Procedures   Under [facility] MyChart ID and password with another person, that person may be able to view my or my child’s health information, and health information about someone who has authorized me as a MyChart proxy.    ·  I agree that it is my responsibility to select a confident Sign-Up Form and I agree to its terms.        Authorization Form     Please enter Patient’s information below:   Name (last, first, middle initial) __________________________________________   Gender  Male  Female    Last 4 Digits of Social Security Number Parent/Legal Guardian Signature                                  For Patient (1517 years of age)  I agree to allow my parent/legal guardian, named above, online access to my medical information currently available and that may become available as a result

## 2020-02-08 NOTE — PLAN OF CARE
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  Neuro: A&O x4. Pleasant and calm.   GI/: Voiding spontaneously without difficulty.   Diet: NPO, ice chips.  Incisions/Drains: Midline incision with staples and some redness.  IV Access: R double lumen PICC infusing TPN/Lipids and Maxipime. L PIV saline locked.   Labs: B  Vitals: Tachycardic. All other vitals stable.  Pain: Pt reported some pain this shift. PRN dilaudid given x2 and PRN oxycodone given x2.  Plan: Continue with plan of care.

## 2020-02-08 NOTE — PLAN OF CARE
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 PICC placed- double lumen- infusing cont. TPN + MIVF  Incision: Staples, erythema surrounding incision + scabbing. Previous JAZZMINE sites scabbing as well   Plan: Continue to monitor pain, nausea, labs, VS, output, and bowel function   "

## 2020-02-08 NOTE — PLAN OF CARE
Abdominal incision with staples, redness along incision line, c/o pain at right upper abdomen, relieved with Oxycodone, Robaxin and iv Dilaudid for breakthrough pain, passing small amount of gas and small stool today, voiding spont. NPO, PICC with TPN and IV antibiotics, up with minimal assist to bathroom.

## 2020-02-09 LAB
ANION GAP SERPL CALCULATED.3IONS-SCNC: 3 MMOL/L (ref 3–14)
BASOPHILS # BLD AUTO: 0.1 10E9/L (ref 0–0.2)
BASOPHILS NFR BLD AUTO: 0.3 %
BUN SERPL-MCNC: 11 MG/DL (ref 7–30)
CALCIUM SERPL-MCNC: 8.1 MG/DL (ref 8.5–10.1)
CHLORIDE SERPL-SCNC: 107 MMOL/L (ref 94–109)
CO2 SERPL-SCNC: 27 MMOL/L (ref 20–32)
CREAT SERPL-MCNC: 0.61 MG/DL (ref 0.52–1.04)
DIFFERENTIAL METHOD BLD: ABNORMAL
EOSINOPHIL # BLD AUTO: 0.4 10E9/L (ref 0–0.7)
EOSINOPHIL NFR BLD AUTO: 2.6 %
ERYTHROCYTE [DISTWIDTH] IN BLOOD BY AUTOMATED COUNT: 14.7 % (ref 10–15)
GFR SERPL CREATININE-BSD FRML MDRD: >90 ML/MIN/{1.73_M2}
GLUCOSE BLDC GLUCOMTR-MCNC: 115 MG/DL (ref 70–99)
GLUCOSE BLDC GLUCOMTR-MCNC: 135 MG/DL (ref 70–99)
GLUCOSE BLDC GLUCOMTR-MCNC: 136 MG/DL (ref 70–99)
GLUCOSE SERPL-MCNC: 150 MG/DL (ref 70–99)
HCT VFR BLD AUTO: 31.1 % (ref 35–47)
HGB BLD-MCNC: 9.9 G/DL (ref 11.7–15.7)
IMM GRANULOCYTES # BLD: 0.4 10E9/L (ref 0–0.4)
IMM GRANULOCYTES NFR BLD: 2.7 %
LACTATE BLD-SCNC: 0.9 MMOL/L (ref 0.7–2)
LYMPHOCYTES # BLD AUTO: 1.7 10E9/L (ref 0.8–5.3)
LYMPHOCYTES NFR BLD AUTO: 10.6 %
MAGNESIUM SERPL-MCNC: 2.1 MG/DL (ref 1.6–2.3)
MCH RBC QN AUTO: 28 PG (ref 26.5–33)
MCHC RBC AUTO-ENTMCNC: 31.8 G/DL (ref 31.5–36.5)
MCV RBC AUTO: 88 FL (ref 78–100)
MONOCYTES # BLD AUTO: 1 10E9/L (ref 0–1.3)
MONOCYTES NFR BLD AUTO: 6.4 %
NEUTROPHILS # BLD AUTO: 12.2 10E9/L (ref 1.6–8.3)
NEUTROPHILS NFR BLD AUTO: 77.4 %
NRBC # BLD AUTO: 0 10*3/UL
NRBC BLD AUTO-RTO: 0 /100
PHOSPHATE SERPL-MCNC: 2.8 MG/DL (ref 2.5–4.5)
PLATELET # BLD AUTO: 526 10E9/L (ref 150–450)
POTASSIUM SERPL-SCNC: 4.5 MMOL/L (ref 3.4–5.3)
RBC # BLD AUTO: 3.53 10E12/L (ref 3.8–5.2)
SODIUM SERPL-SCNC: 138 MMOL/L (ref 133–144)
WBC # BLD AUTO: 15.7 10E9/L (ref 4–11)

## 2020-02-09 PROCEDURE — 85025 COMPLETE CBC W/AUTO DIFF WBC: CPT | Performed by: SURGERY

## 2020-02-09 PROCEDURE — 83605 ASSAY OF LACTIC ACID: CPT | Performed by: SURGERY

## 2020-02-09 PROCEDURE — 36592 COLLECT BLOOD FROM PICC: CPT | Performed by: SURGERY

## 2020-02-09 PROCEDURE — 25000128 H RX IP 250 OP 636: Performed by: STUDENT IN AN ORGANIZED HEALTH CARE EDUCATION/TRAINING PROGRAM

## 2020-02-09 PROCEDURE — 25000132 ZZH RX MED GY IP 250 OP 250 PS 637: Mod: GY | Performed by: SURGERY

## 2020-02-09 PROCEDURE — 25000125 ZZHC RX 250: Performed by: SURGERY

## 2020-02-09 PROCEDURE — 25000132 ZZH RX MED GY IP 250 OP 250 PS 637: Mod: GY | Performed by: STUDENT IN AN ORGANIZED HEALTH CARE EDUCATION/TRAINING PROGRAM

## 2020-02-09 PROCEDURE — 25800030 ZZH RX IP 258 OP 636: Performed by: STUDENT IN AN ORGANIZED HEALTH CARE EDUCATION/TRAINING PROGRAM

## 2020-02-09 PROCEDURE — 83735 ASSAY OF MAGNESIUM: CPT | Performed by: SURGERY

## 2020-02-09 PROCEDURE — 40000556 ZZH STATISTIC PERIPHERAL IV START W US GUIDANCE

## 2020-02-09 PROCEDURE — 80048 BASIC METABOLIC PNL TOTAL CA: CPT | Performed by: SURGERY

## 2020-02-09 PROCEDURE — 00000146 ZZHCL STATISTIC GLUCOSE BY METER IP

## 2020-02-09 PROCEDURE — 25000128 H RX IP 250 OP 636: Performed by: SURGERY

## 2020-02-09 PROCEDURE — 12000001 ZZH R&B MED SURG/OB UMMC

## 2020-02-09 PROCEDURE — 84100 ASSAY OF PHOSPHORUS: CPT | Performed by: SURGERY

## 2020-02-09 PROCEDURE — 40000141 ZZH STATISTIC PERIPHERAL IV START W/O US GUIDANCE

## 2020-02-09 RX ADMIN — METHOCARBAMOL 500 MG: 500 TABLET, FILM COATED ORAL at 20:45

## 2020-02-09 RX ADMIN — METOCLOPRAMIDE HYDROCHLORIDE 10 MG: 10 TABLET ORAL at 20:55

## 2020-02-09 RX ADMIN — OXYCODONE HYDROCHLORIDE 15 MG: 5 TABLET ORAL at 07:43

## 2020-02-09 RX ADMIN — SODIUM CHLORIDE 1000 ML: 9 INJECTION, SOLUTION INTRAVENOUS at 13:49

## 2020-02-09 RX ADMIN — METHOCARBAMOL 500 MG: 500 TABLET, FILM COATED ORAL at 11:42

## 2020-02-09 RX ADMIN — PANTOPRAZOLE SODIUM 40 MG: 40 TABLET, DELAYED RELEASE ORAL at 07:36

## 2020-02-09 RX ADMIN — CITALOPRAM HYDROBROMIDE 20 MG: 20 TABLET ORAL at 07:39

## 2020-02-09 RX ADMIN — Medication 0.2 MG: at 11:42

## 2020-02-09 RX ADMIN — HYDROXYZINE HYDROCHLORIDE 10 MG: 10 TABLET ORAL at 14:14

## 2020-02-09 RX ADMIN — METHOCARBAMOL 500 MG: 500 TABLET, FILM COATED ORAL at 16:04

## 2020-02-09 RX ADMIN — METOCLOPRAMIDE HYDROCHLORIDE 10 MG: 10 TABLET ORAL at 16:04

## 2020-02-09 RX ADMIN — METHOCARBAMOL 500 MG: 500 TABLET, FILM COATED ORAL at 07:36

## 2020-02-09 RX ADMIN — OXYCODONE HYDROCHLORIDE 15 MG: 5 TABLET ORAL at 20:54

## 2020-02-09 RX ADMIN — SENNOSIDES AND DOCUSATE SODIUM 1 TABLET: 8.6; 5 TABLET ORAL at 20:55

## 2020-02-09 RX ADMIN — I.V. FAT EMULSION 250 ML: 20 EMULSION INTRAVENOUS at 20:36

## 2020-02-09 RX ADMIN — Medication 0.4 MG: at 16:04

## 2020-02-09 RX ADMIN — Medication 0.4 MG: at 18:12

## 2020-02-09 RX ADMIN — OXYCODONE HYDROCHLORIDE 15 MG: 5 TABLET ORAL at 15:07

## 2020-02-09 RX ADMIN — METOCLOPRAMIDE HYDROCHLORIDE 10 MG: 10 TABLET ORAL at 11:42

## 2020-02-09 RX ADMIN — DICLOFENAC EPOLAMINE 1 PATCH: 0.01 PATCH TOPICAL at 13:39

## 2020-02-09 RX ADMIN — METRONIDAZOLE 500 MG: 500 INJECTION, SOLUTION INTRAVENOUS at 10:17

## 2020-02-09 RX ADMIN — Medication 0.4 MG: at 22:35

## 2020-02-09 RX ADMIN — CEFEPIME HYDROCHLORIDE 2 G: 2 INJECTION, POWDER, FOR SOLUTION INTRAVENOUS at 10:16

## 2020-02-09 RX ADMIN — POTASSIUM CHLORIDE: 2 INJECTION, SOLUTION, CONCENTRATE INTRAVENOUS at 20:36

## 2020-02-09 RX ADMIN — METRONIDAZOLE 500 MG: 500 INJECTION, SOLUTION INTRAVENOUS at 18:12

## 2020-02-09 RX ADMIN — METRONIDAZOLE 500 MG: 500 INJECTION, SOLUTION INTRAVENOUS at 02:09

## 2020-02-09 RX ADMIN — CEFEPIME HYDROCHLORIDE 2 G: 2 INJECTION, POWDER, FOR SOLUTION INTRAVENOUS at 20:57

## 2020-02-09 RX ADMIN — ENOXAPARIN SODIUM 40 MG: 40 INJECTION SUBCUTANEOUS at 10:16

## 2020-02-09 RX ADMIN — AMITRIPTYLINE HYDROCHLORIDE 75 MG: 75 TABLET, FILM COATED ORAL at 20:56

## 2020-02-09 RX ADMIN — Medication 0.2 MG: at 13:43

## 2020-02-09 RX ADMIN — MICAFUNGIN SODIUM 100 MG: 10 INJECTION, POWDER, LYOPHILIZED, FOR SOLUTION INTRAVENOUS at 13:45

## 2020-02-09 RX ADMIN — Medication 0.2 MG: at 07:00

## 2020-02-09 RX ADMIN — METOCLOPRAMIDE HYDROCHLORIDE 10 MG: 10 TABLET ORAL at 07:36

## 2020-02-09 ASSESSMENT — ACTIVITIES OF DAILY LIVING (ADL)
ADLS_ACUITY_SCORE: 14

## 2020-02-09 ASSESSMENT — PAIN DESCRIPTION - DESCRIPTORS
DESCRIPTORS: PRESSURE;SHARP;STABBING
DESCRIPTORS: PRESSURE;SHARP;STABBING
DESCRIPTORS: HEAVINESS;PENETRATING;PRESSURE
DESCRIPTORS: ACHING
DESCRIPTORS: PRESSURE;SHARP;STABBING

## 2020-02-09 ASSESSMENT — MIFFLIN-ST. JEOR: SCORE: 1513.52

## 2020-02-09 NOTE — PLAN OF CARE
Abdominal incision with staples, scant serosanguinous drainage from umbilicus incision site.Old pnt site scabbed over. NPO, PICC line for TPN and IV antibiotics,positive bowel sounds, passing flatus, voiding spont. C/O pain at right upper abdomen, Oxycodone, Robaxin and iv Dilaudid for breakthrough pain.Up with minimal assist,glucose last checked at 1400.

## 2020-02-09 NOTE — PLAN OF CARE
Pt has been a/o x 4 overnight. Sleepy this am. No oxycodone given since pm dose. Gets up to commode with SBA. Voiding large amounts. Had small BM x 1 overnight. TPN and lipids infusing as ordered. Abdominal staples with scant serous drainage and erythema. LS have crackles in LLL. PICC infusing and PIV sl'd. States she feels hungry. Will continue to monitor pt.

## 2020-02-09 NOTE — PROGRESS NOTES
"SURG ONC NOTE    Pt reports she is feeling better today. Having BMs. Pain improved. Thought maybe there was some drainage from central aspect of wound yesterday.    /72 (BP Location: Left arm)   Pulse 98   Temp 98.1  F (36.7  C) (Oral)   Resp 16   Ht 1.727 m (5' 8\")   Wt 86.1 kg (189 lb 14.4 oz)   LMP 09/23/2014   SpO2 95%   BMI 28.87 kg/m    NAD  NLB   Abd soft, nondist, minimally ttp RUQ  Incision c/d/i with staples - reactive erythema at staple insertion sites, no drainage    WBC 15.7 < 17.6 < 15.6    A/P: POD12 s/p Whipple c/b intraabd fluid collection, E coli bacteremia, and ?SB pneumatosis.  -NPO/TPN - TPN + IVF = 125 ml/hr  -IV cefepime, flagyl, micafungin  -Ppx: ambulate, IS, lovenox daily    Januayr Mckeon MD MPH (PGY7)  Surgery      "

## 2020-02-10 ENCOUNTER — APPOINTMENT (OUTPATIENT)
Dept: CT IMAGING | Facility: CLINIC | Age: 55
DRG: 920 | End: 2020-02-10
Attending: PHYSICIAN ASSISTANT
Payer: MEDICARE

## 2020-02-10 PROBLEM — R78.81 BACTEREMIA: Status: ACTIVE | Noted: 2020-02-10

## 2020-02-10 PROBLEM — R18.8 INTRAABDOMINAL FLUID COLLECTION: Status: ACTIVE | Noted: 2020-02-10

## 2020-02-10 PROBLEM — E46 MALNUTRITION (H): Status: ACTIVE | Noted: 2020-02-10

## 2020-02-10 LAB
ALBUMIN SERPL-MCNC: 2 G/DL (ref 3.4–5)
ALP SERPL-CCNC: 150 U/L (ref 40–150)
ALT SERPL W P-5'-P-CCNC: 13 U/L (ref 0–50)
ANION GAP SERPL CALCULATED.3IONS-SCNC: 6 MMOL/L (ref 3–14)
AST SERPL W P-5'-P-CCNC: 24 U/L (ref 0–45)
BACTERIA SPEC CULT: NO GROWTH
BASOPHILS # BLD AUTO: 0.1 10E9/L (ref 0–0.2)
BASOPHILS NFR BLD AUTO: 0.4 %
BILIRUB SERPL-MCNC: 0.1 MG/DL (ref 0.2–1.3)
BUN SERPL-MCNC: 13 MG/DL (ref 7–30)
CALCIUM SERPL-MCNC: 8 MG/DL (ref 8.5–10.1)
CHLORIDE SERPL-SCNC: 109 MMOL/L (ref 94–109)
CO2 SERPL-SCNC: 25 MMOL/L (ref 20–32)
CREAT SERPL-MCNC: 0.58 MG/DL (ref 0.52–1.04)
DIFFERENTIAL METHOD BLD: ABNORMAL
EOSINOPHIL # BLD AUTO: 0.5 10E9/L (ref 0–0.7)
EOSINOPHIL NFR BLD AUTO: 3.2 %
ERYTHROCYTE [DISTWIDTH] IN BLOOD BY AUTOMATED COUNT: 14.9 % (ref 10–15)
GFR SERPL CREATININE-BSD FRML MDRD: >90 ML/MIN/{1.73_M2}
GLUCOSE BLDC GLUCOMTR-MCNC: 118 MG/DL (ref 70–99)
GLUCOSE BLDC GLUCOMTR-MCNC: 124 MG/DL (ref 70–99)
GLUCOSE SERPL-MCNC: 145 MG/DL (ref 70–99)
HCT VFR BLD AUTO: 32.1 % (ref 35–47)
HGB BLD-MCNC: 10.1 G/DL (ref 11.7–15.7)
IMM GRANULOCYTES # BLD: 0.6 10E9/L (ref 0–0.4)
IMM GRANULOCYTES NFR BLD: 3.9 %
INR PPP: 1.21 (ref 0.86–1.14)
LACTATE BLD-SCNC: 0.6 MMOL/L (ref 0.7–2)
LYMPHOCYTES # BLD AUTO: 1.9 10E9/L (ref 0.8–5.3)
LYMPHOCYTES NFR BLD AUTO: 11.8 %
MAGNESIUM SERPL-MCNC: 2 MG/DL (ref 1.6–2.3)
MCH RBC QN AUTO: 27.8 PG (ref 26.5–33)
MCHC RBC AUTO-ENTMCNC: 31.5 G/DL (ref 31.5–36.5)
MCV RBC AUTO: 88 FL (ref 78–100)
MONOCYTES # BLD AUTO: 1.1 10E9/L (ref 0–1.3)
MONOCYTES NFR BLD AUTO: 7 %
NEUTROPHILS # BLD AUTO: 12.1 10E9/L (ref 1.6–8.3)
NEUTROPHILS NFR BLD AUTO: 73.7 %
NRBC # BLD AUTO: 0 10*3/UL
NRBC BLD AUTO-RTO: 0 /100
PHOSPHATE SERPL-MCNC: 3.2 MG/DL (ref 2.5–4.5)
PLATELET # BLD AUTO: 559 10E9/L (ref 150–450)
POTASSIUM SERPL-SCNC: 4.1 MMOL/L (ref 3.4–5.3)
PREALB SERPL IA-MCNC: 6 MG/DL (ref 15–45)
PREALB SERPL IA-MCNC: 8 MG/DL (ref 15–45)
PROT SERPL-MCNC: 6.2 G/DL (ref 6.8–8.8)
RADIOLOGIST FLAGS: ABNORMAL
RBC # BLD AUTO: 3.63 10E12/L (ref 3.8–5.2)
SODIUM SERPL-SCNC: 140 MMOL/L (ref 133–144)
SPECIMEN SOURCE: NORMAL
WBC # BLD AUTO: 16.3 10E9/L (ref 4–11)

## 2020-02-10 PROCEDURE — 36592 COLLECT BLOOD FROM PICC: CPT | Performed by: SURGERY

## 2020-02-10 PROCEDURE — 36415 COLL VENOUS BLD VENIPUNCTURE: CPT | Performed by: SURGERY

## 2020-02-10 PROCEDURE — 25000132 ZZH RX MED GY IP 250 OP 250 PS 637: Mod: GY | Performed by: STUDENT IN AN ORGANIZED HEALTH CARE EDUCATION/TRAINING PROGRAM

## 2020-02-10 PROCEDURE — 12000001 ZZH R&B MED SURG/OB UMMC

## 2020-02-10 PROCEDURE — 25000128 H RX IP 250 OP 636: Performed by: SURGERY

## 2020-02-10 PROCEDURE — 00000146 ZZHCL STATISTIC GLUCOSE BY METER IP

## 2020-02-10 PROCEDURE — 25800030 ZZH RX IP 258 OP 636: Performed by: STUDENT IN AN ORGANIZED HEALTH CARE EDUCATION/TRAINING PROGRAM

## 2020-02-10 PROCEDURE — 25000132 ZZH RX MED GY IP 250 OP 250 PS 637: Mod: GY | Performed by: SURGERY

## 2020-02-10 PROCEDURE — 84100 ASSAY OF PHOSPHORUS: CPT | Performed by: SURGERY

## 2020-02-10 PROCEDURE — 25000128 H RX IP 250 OP 636: Performed by: STUDENT IN AN ORGANIZED HEALTH CARE EDUCATION/TRAINING PROGRAM

## 2020-02-10 PROCEDURE — 85025 COMPLETE CBC W/AUTO DIFF WBC: CPT | Performed by: SURGERY

## 2020-02-10 PROCEDURE — 80053 COMPREHEN METABOLIC PANEL: CPT | Performed by: SURGERY

## 2020-02-10 PROCEDURE — 40000556 ZZH STATISTIC PERIPHERAL IV START W US GUIDANCE

## 2020-02-10 PROCEDURE — 84134 ASSAY OF PREALBUMIN: CPT | Performed by: SURGERY

## 2020-02-10 PROCEDURE — 99024 POSTOP FOLLOW-UP VISIT: CPT | Performed by: PHYSICIAN ASSISTANT

## 2020-02-10 PROCEDURE — 83605 ASSAY OF LACTIC ACID: CPT | Performed by: SURGERY

## 2020-02-10 PROCEDURE — 40000802 ZZH SITE CHECK

## 2020-02-10 PROCEDURE — 74177 CT ABD & PELVIS W/CONTRAST: CPT

## 2020-02-10 PROCEDURE — 25000125 ZZHC RX 250: Performed by: SURGERY

## 2020-02-10 PROCEDURE — 83735 ASSAY OF MAGNESIUM: CPT | Performed by: SURGERY

## 2020-02-10 PROCEDURE — 85610 PROTHROMBIN TIME: CPT | Performed by: SURGERY

## 2020-02-10 RX ORDER — IOPAMIDOL 755 MG/ML
115 INJECTION, SOLUTION INTRAVASCULAR ONCE
Status: COMPLETED | OUTPATIENT
Start: 2020-02-10 | End: 2020-02-10

## 2020-02-10 RX ORDER — AMOXICILLIN 250 MG
1 CAPSULE ORAL
Status: DISCONTINUED | OUTPATIENT
Start: 2020-02-10 | End: 2020-02-15 | Stop reason: HOSPADM

## 2020-02-10 RX ORDER — POLYETHYLENE GLYCOL 3350 17 G/17G
17 POWDER, FOR SOLUTION ORAL DAILY PRN
Status: DISCONTINUED | OUTPATIENT
Start: 2020-02-10 | End: 2020-02-15 | Stop reason: HOSPADM

## 2020-02-10 RX ADMIN — OXYCODONE HYDROCHLORIDE 15 MG: 5 TABLET ORAL at 19:37

## 2020-02-10 RX ADMIN — Medication 0.4 MG: at 18:45

## 2020-02-10 RX ADMIN — METHOCARBAMOL 500 MG: 500 TABLET, FILM COATED ORAL at 11:52

## 2020-02-10 RX ADMIN — METHOCARBAMOL 500 MG: 500 TABLET, FILM COATED ORAL at 19:37

## 2020-02-10 RX ADMIN — METRONIDAZOLE 500 MG: 500 INJECTION, SOLUTION INTRAVENOUS at 19:37

## 2020-02-10 RX ADMIN — METOCLOPRAMIDE HYDROCHLORIDE 10 MG: 10 TABLET ORAL at 22:34

## 2020-02-10 RX ADMIN — DICLOFENAC EPOLAMINE 1 PATCH: 0.01 PATCH TOPICAL at 10:50

## 2020-02-10 RX ADMIN — METRONIDAZOLE 500 MG: 500 INJECTION, SOLUTION INTRAVENOUS at 09:33

## 2020-02-10 RX ADMIN — Medication 0.4 MG: at 09:34

## 2020-02-10 RX ADMIN — METHOCARBAMOL 500 MG: 500 TABLET, FILM COATED ORAL at 16:06

## 2020-02-10 RX ADMIN — PANTOPRAZOLE SODIUM 40 MG: 40 TABLET, DELAYED RELEASE ORAL at 08:31

## 2020-02-10 RX ADMIN — OXYCODONE HYDROCHLORIDE 15 MG: 5 TABLET ORAL at 07:00

## 2020-02-10 RX ADMIN — Medication 0.4 MG: at 20:56

## 2020-02-10 RX ADMIN — IOPAMIDOL 115 ML: 755 INJECTION, SOLUTION INTRAVENOUS at 10:31

## 2020-02-10 RX ADMIN — Medication 0.4 MG: at 11:52

## 2020-02-10 RX ADMIN — CEFEPIME HYDROCHLORIDE 2 G: 2 INJECTION, POWDER, FOR SOLUTION INTRAVENOUS at 22:34

## 2020-02-10 RX ADMIN — SODIUM CHLORIDE 1000 ML: 9 INJECTION, SOLUTION INTRAVENOUS at 22:34

## 2020-02-10 RX ADMIN — Medication 0.4 MG: at 14:40

## 2020-02-10 RX ADMIN — METOCLOPRAMIDE HYDROCHLORIDE 10 MG: 10 TABLET ORAL at 16:06

## 2020-02-10 RX ADMIN — ENOXAPARIN SODIUM 40 MG: 40 INJECTION SUBCUTANEOUS at 09:34

## 2020-02-10 RX ADMIN — Medication 0.4 MG: at 22:47

## 2020-02-10 RX ADMIN — HYDROXYZINE HYDROCHLORIDE 10 MG: 10 TABLET ORAL at 21:35

## 2020-02-10 RX ADMIN — Medication 0.4 MG: at 16:48

## 2020-02-10 RX ADMIN — AMITRIPTYLINE HYDROCHLORIDE 75 MG: 75 TABLET, FILM COATED ORAL at 22:34

## 2020-02-10 RX ADMIN — MICAFUNGIN SODIUM 100 MG: 10 INJECTION, POWDER, LYOPHILIZED, FOR SOLUTION INTRAVENOUS at 10:52

## 2020-02-10 RX ADMIN — METHOCARBAMOL 500 MG: 500 TABLET, FILM COATED ORAL at 08:31

## 2020-02-10 RX ADMIN — METOCLOPRAMIDE HYDROCHLORIDE 10 MG: 10 TABLET ORAL at 11:52

## 2020-02-10 RX ADMIN — OXYCODONE HYDROCHLORIDE 15 MG: 5 TABLET ORAL at 13:24

## 2020-02-10 RX ADMIN — I.V. FAT EMULSION 250 ML: 20 EMULSION INTRAVENOUS at 20:00

## 2020-02-10 RX ADMIN — SODIUM CHLORIDE 1000 ML: 9 INJECTION, SOLUTION INTRAVENOUS at 13:24

## 2020-02-10 RX ADMIN — METOCLOPRAMIDE HYDROCHLORIDE 10 MG: 10 TABLET ORAL at 08:31

## 2020-02-10 RX ADMIN — METRONIDAZOLE 500 MG: 500 INJECTION, SOLUTION INTRAVENOUS at 02:11

## 2020-02-10 RX ADMIN — CEFEPIME HYDROCHLORIDE 2 G: 2 INJECTION, POWDER, FOR SOLUTION INTRAVENOUS at 10:52

## 2020-02-10 RX ADMIN — CITALOPRAM HYDROBROMIDE 20 MG: 20 TABLET ORAL at 08:31

## 2020-02-10 RX ADMIN — POTASSIUM CHLORIDE: 2 INJECTION, SOLUTION, CONCENTRATE INTRAVENOUS at 20:00

## 2020-02-10 ASSESSMENT — PAIN DESCRIPTION - DESCRIPTORS
DESCRIPTORS: SORE
DESCRIPTORS: ACHING
DESCRIPTORS: ACHING;SHARP
DESCRIPTORS: ACHING;SHOOTING
DESCRIPTORS: ACHING;SHOOTING
DESCRIPTORS: ACHING;SHARP
DESCRIPTORS: ACHING
DESCRIPTORS: ACHING;SHARP
DESCRIPTORS: ACHING;SHARP
DESCRIPTORS: ACHING
DESCRIPTORS: ACHING;SHARP
DESCRIPTORS: ACHING

## 2020-02-10 ASSESSMENT — ACTIVITIES OF DAILY LIVING (ADL)
ADLS_ACUITY_SCORE: 14
ADLS_ACUITY_SCORE: 15
ADLS_ACUITY_SCORE: 14
ADLS_ACUITY_SCORE: 14

## 2020-02-10 ASSESSMENT — MIFFLIN-ST. JEOR: SCORE: 1507.17

## 2020-02-10 NOTE — PLAN OF CARE
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 Steady  Pain:  Dilaudid IV oxycodone PRN. Pain incisional and on right side secondary to possible abcess  Takes opioids at baseline at home.   Neuro: A&Ox4  Cardiac: WNL S1 S2  Respiratory: WNL, LSC  GI/: Commode at bedside.   Diet: TPN/LIPIDS; NPO  Lines: Picc  Skin/wounds: Incision on abdomen, with out drainage, minor redness.      Continue to monitor and follow POC

## 2020-02-10 NOTE — PROGRESS NOTES
Surgical Oncology Progress Note    Interval History:  No acute events overnight. Tachycardia to 110 and WBC up to 16.3. Reports she feels like there is a rock in her RUQ. Pain controlled. Denies nausea and is hungry. Passing flatus.     Physical Exam:   Temp:  [96.2  F (35.7  C)-98.4  F (36.9  C)] 96.2  F (35.7  C)  Pulse:  [] 110  Heart Rate:  [] 99  Resp:  [18] 18  BP: (126-142)/(54-74) 130/71  SpO2:  [95 %-97 %] 95 %  General: Alert, oriented, appears comfortable, NAD.  Respiratory: breathing non labored  Abdomen: Abdomen is soft, tender to palpation in RUQ, non-distended. Incision is clean, dry and intact with staples.      Data:   All laboratory and imaging data in the past 24 hours reviewed  I/O last 3 completed shifts:  In: 3078.21 [P.O.:200; I.V.:1930]  Out: 2550 [Urine:2550]  Recent Labs   Lab Test 02/10/20  0732 02/09/20  0726 02/08/20  0732  01/29/20  0654   WBC 16.3* 15.7* 17.6*   < > 17.3*   HGB 10.1* 9.9* 10.0*   < > 11.9   * 526* 503*   < > 338   INR 1.21*  --  1.27*  --  1.28*    < > = values in this interval not displayed.      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 Now readmitted with RUQ pain, tachycardia and leukocytosis. CT with fluid collection in surgical bed and question of small bowel limb pneumatosis. Also found to have Ecoli bacteremia. WBC increased today and tachycardic.     - Pain: oxycodone prn, dilaudid IV  prn. Robaxin. Atarax prn.     - Home amitriptyline and citalopram restarted    - Question of small bowel pneumatosis on CT scan 2/4: NPO, TPN/IVF. Repeat CT abdomen today.     - Abdominal pain with leukocytosis and tachycardia: UA negative 2/4. EKG with sinus tachycardia 2/4. CXR with atelectasis. CT negative for PE 2/4. CT abdomen 2/4 with fluid collection surrounding pancreatic head and duodenum along with significant inflammatory changes of the duodenum and ascending colon possible pneumatosis. Blood cultures 2/4 positive for E.coli bacteremia. Cefepime/flagyl/microfungin started 2/5. Increased WBC today, CT abdomen and pelvis with IV contrast today.     - Lovenox, protonix.     Seen with Chief resident and Dr. Mallory.     Pia Oneil PA-C   Surgical Oncology

## 2020-02-10 NOTE — PROGRESS NOTES
"Pt stated \"The doctors said I need to rest my stomach and intestines so I can not go for walks and I need to use the commode\".   "

## 2020-02-10 NOTE — PLAN OF CARE
[id number]: Pt stable overnight. Appeared to sleep soundly between cares. PICC with TPN and lipids and MIV infusing as ordered. Had IV dilaudid last shift at 2232. Has not requested meds since. Will encourage po Oxycodone this am instead of IV dilaudid.  Gets up to commode with SBA.

## 2020-02-10 NOTE — PLAN OF CARE
AVSS. Abdominal pain managed with PRN analgesics. Denies nausea. PICC infusing TPN and scheduled IV abx. PIV infusing MIVF. Midline incision with staples, blanchable erythema around borders. Pt voiding adequately. Last BM 2/9, passing flatus. Pt up Aof1 with cane, ambulated in halls 2x. Continue POC.

## 2020-02-11 LAB
ANION GAP SERPL CALCULATED.3IONS-SCNC: 7 MMOL/L (ref 3–14)
BASOPHILS # BLD AUTO: 0.1 10E9/L (ref 0–0.2)
BASOPHILS NFR BLD AUTO: 0.5 %
BUN SERPL-MCNC: 13 MG/DL (ref 7–30)
CALCIUM SERPL-MCNC: 8.2 MG/DL (ref 8.5–10.1)
CHLORIDE SERPL-SCNC: 109 MMOL/L (ref 94–109)
CO2 SERPL-SCNC: 24 MMOL/L (ref 20–32)
CREAT SERPL-MCNC: 0.64 MG/DL (ref 0.52–1.04)
DIFFERENTIAL METHOD BLD: ABNORMAL
EOSINOPHIL # BLD AUTO: 0.7 10E9/L (ref 0–0.7)
EOSINOPHIL NFR BLD AUTO: 4 %
ERYTHROCYTE [DISTWIDTH] IN BLOOD BY AUTOMATED COUNT: 15.2 % (ref 10–15)
GFR SERPL CREATININE-BSD FRML MDRD: >90 ML/MIN/{1.73_M2}
GLUCOSE BLDC GLUCOMTR-MCNC: 124 MG/DL (ref 70–99)
GLUCOSE BLDC GLUCOMTR-MCNC: 136 MG/DL (ref 70–99)
GLUCOSE SERPL-MCNC: 138 MG/DL (ref 70–99)
HCT VFR BLD AUTO: 32.2 % (ref 35–47)
HGB BLD-MCNC: 10.2 G/DL (ref 11.7–15.7)
IMM GRANULOCYTES # BLD: 0.8 10E9/L (ref 0–0.4)
IMM GRANULOCYTES NFR BLD: 4.6 %
LACTATE BLD-SCNC: 0.7 MMOL/L (ref 0.7–2)
LYMPHOCYTES # BLD AUTO: 2.2 10E9/L (ref 0.8–5.3)
LYMPHOCYTES NFR BLD AUTO: 13.1 %
MAGNESIUM SERPL-MCNC: 2.1 MG/DL (ref 1.6–2.3)
MCH RBC QN AUTO: 27.8 PG (ref 26.5–33)
MCHC RBC AUTO-ENTMCNC: 31.7 G/DL (ref 31.5–36.5)
MCV RBC AUTO: 88 FL (ref 78–100)
MONOCYTES # BLD AUTO: 1.3 10E9/L (ref 0–1.3)
MONOCYTES NFR BLD AUTO: 7.5 %
NEUTROPHILS # BLD AUTO: 11.9 10E9/L (ref 1.6–8.3)
NEUTROPHILS NFR BLD AUTO: 70.3 %
NRBC # BLD AUTO: 0 10*3/UL
NRBC BLD AUTO-RTO: 0 /100
PHOSPHATE SERPL-MCNC: 3.2 MG/DL (ref 2.5–4.5)
PLATELET # BLD AUTO: 582 10E9/L (ref 150–450)
POTASSIUM SERPL-SCNC: 3.4 MMOL/L (ref 3.4–5.3)
RBC # BLD AUTO: 3.67 10E12/L (ref 3.8–5.2)
SODIUM SERPL-SCNC: 139 MMOL/L (ref 133–144)
WBC # BLD AUTO: 16.9 10E9/L (ref 4–11)

## 2020-02-11 PROCEDURE — 25000128 H RX IP 250 OP 636: Performed by: STUDENT IN AN ORGANIZED HEALTH CARE EDUCATION/TRAINING PROGRAM

## 2020-02-11 PROCEDURE — 00000146 ZZHCL STATISTIC GLUCOSE BY METER IP

## 2020-02-11 PROCEDURE — 25000132 ZZH RX MED GY IP 250 OP 250 PS 637: Mod: GY | Performed by: SURGERY

## 2020-02-11 PROCEDURE — 25000132 ZZH RX MED GY IP 250 OP 250 PS 637: Mod: GY | Performed by: STUDENT IN AN ORGANIZED HEALTH CARE EDUCATION/TRAINING PROGRAM

## 2020-02-11 PROCEDURE — 83605 ASSAY OF LACTIC ACID: CPT | Performed by: SURGERY

## 2020-02-11 PROCEDURE — 25000128 H RX IP 250 OP 636: Performed by: SURGERY

## 2020-02-11 PROCEDURE — 80048 BASIC METABOLIC PNL TOTAL CA: CPT | Performed by: SURGERY

## 2020-02-11 PROCEDURE — 83735 ASSAY OF MAGNESIUM: CPT | Performed by: SURGERY

## 2020-02-11 PROCEDURE — 36415 COLL VENOUS BLD VENIPUNCTURE: CPT | Performed by: SURGERY

## 2020-02-11 PROCEDURE — 85025 COMPLETE CBC W/AUTO DIFF WBC: CPT | Performed by: SURGERY

## 2020-02-11 PROCEDURE — 36592 COLLECT BLOOD FROM PICC: CPT | Performed by: SURGERY

## 2020-02-11 PROCEDURE — 84100 ASSAY OF PHOSPHORUS: CPT | Performed by: SURGERY

## 2020-02-11 PROCEDURE — 25000125 ZZHC RX 250: Performed by: SURGERY

## 2020-02-11 PROCEDURE — 12000001 ZZH R&B MED SURG/OB UMMC

## 2020-02-11 PROCEDURE — 99024 POSTOP FOLLOW-UP VISIT: CPT | Performed by: PHYSICIAN ASSISTANT

## 2020-02-11 PROCEDURE — 25800030 ZZH RX IP 258 OP 636: Performed by: STUDENT IN AN ORGANIZED HEALTH CARE EDUCATION/TRAINING PROGRAM

## 2020-02-11 RX ADMIN — METHOCARBAMOL 500 MG: 500 TABLET, FILM COATED ORAL at 16:31

## 2020-02-11 RX ADMIN — Medication 0.4 MG: at 14:33

## 2020-02-11 RX ADMIN — OXYCODONE HYDROCHLORIDE 15 MG: 5 TABLET ORAL at 01:43

## 2020-02-11 RX ADMIN — PANTOPRAZOLE SODIUM 40 MG: 40 TABLET, DELAYED RELEASE ORAL at 08:03

## 2020-02-11 RX ADMIN — CITALOPRAM HYDROBROMIDE 20 MG: 20 TABLET ORAL at 08:03

## 2020-02-11 RX ADMIN — Medication 0.4 MG: at 16:31

## 2020-02-11 RX ADMIN — METOCLOPRAMIDE HYDROCHLORIDE 10 MG: 10 TABLET ORAL at 11:28

## 2020-02-11 RX ADMIN — Medication 0.4 MG: at 11:55

## 2020-02-11 RX ADMIN — AMITRIPTYLINE HYDROCHLORIDE 75 MG: 75 TABLET, FILM COATED ORAL at 21:22

## 2020-02-11 RX ADMIN — METOCLOPRAMIDE HYDROCHLORIDE 10 MG: 10 TABLET ORAL at 08:03

## 2020-02-11 RX ADMIN — I.V. FAT EMULSION 250 ML: 20 EMULSION INTRAVENOUS at 20:16

## 2020-02-11 RX ADMIN — Medication 0.4 MG: at 18:36

## 2020-02-11 RX ADMIN — METRONIDAZOLE 500 MG: 500 INJECTION, SOLUTION INTRAVENOUS at 02:51

## 2020-02-11 RX ADMIN — METHOCARBAMOL 500 MG: 500 TABLET, FILM COATED ORAL at 08:03

## 2020-02-11 RX ADMIN — METRONIDAZOLE 500 MG: 500 INJECTION, SOLUTION INTRAVENOUS at 11:00

## 2020-02-11 RX ADMIN — CEFEPIME HYDROCHLORIDE 2 G: 2 INJECTION, POWDER, FOR SOLUTION INTRAVENOUS at 21:23

## 2020-02-11 RX ADMIN — OXYCODONE HYDROCHLORIDE 15 MG: 5 TABLET ORAL at 08:04

## 2020-02-11 RX ADMIN — METOCLOPRAMIDE HYDROCHLORIDE 10 MG: 10 TABLET ORAL at 21:22

## 2020-02-11 RX ADMIN — Medication 0.4 MG: at 09:27

## 2020-02-11 RX ADMIN — METHOCARBAMOL 500 MG: 500 TABLET, FILM COATED ORAL at 20:15

## 2020-02-11 RX ADMIN — DICLOFENAC EPOLAMINE 1 PATCH: 0.01 PATCH TOPICAL at 16:39

## 2020-02-11 RX ADMIN — OXYCODONE HYDROCHLORIDE 15 MG: 5 TABLET ORAL at 14:06

## 2020-02-11 RX ADMIN — POTASSIUM CHLORIDE: 2 INJECTION, SOLUTION, CONCENTRATE INTRAVENOUS at 20:17

## 2020-02-11 RX ADMIN — Medication 0.4 MG: at 00:53

## 2020-02-11 RX ADMIN — METHOCARBAMOL 500 MG: 500 TABLET, FILM COATED ORAL at 11:28

## 2020-02-11 RX ADMIN — Medication 0.4 MG: at 06:43

## 2020-02-11 RX ADMIN — METOCLOPRAMIDE HYDROCHLORIDE 10 MG: 10 TABLET ORAL at 16:31

## 2020-02-11 RX ADMIN — CEFEPIME HYDROCHLORIDE 2 G: 2 INJECTION, POWDER, FOR SOLUTION INTRAVENOUS at 12:50

## 2020-02-11 RX ADMIN — METRONIDAZOLE 500 MG: 500 INJECTION, SOLUTION INTRAVENOUS at 18:18

## 2020-02-11 RX ADMIN — Medication 0.4 MG: at 21:21

## 2020-02-11 RX ADMIN — ENOXAPARIN SODIUM 40 MG: 40 INJECTION SUBCUTANEOUS at 10:54

## 2020-02-11 RX ADMIN — MICAFUNGIN SODIUM 100 MG: 10 INJECTION, POWDER, LYOPHILIZED, FOR SOLUTION INTRAVENOUS at 09:52

## 2020-02-11 RX ADMIN — OXYCODONE HYDROCHLORIDE 15 MG: 5 TABLET ORAL at 20:15

## 2020-02-11 ASSESSMENT — PAIN DESCRIPTION - DESCRIPTORS
DESCRIPTORS: ACHING;CONSTANT
DESCRIPTORS: ACHING

## 2020-02-11 ASSESSMENT — ACTIVITIES OF DAILY LIVING (ADL)
ADLS_ACUITY_SCORE: 14

## 2020-02-11 NOTE — PLAN OF CARE
VSS on RA. A&Ox4. Pt up independently in room. Pt started on clear liquids today. Tolerating water, broth and jello. No BM this shift, pt passing gas. Continues to have aching abdominal pain in RUQ. PO oxy and IV dilaudid given with good relief. DL PICC infusing IV abx and TPN. Pt calls appropriately. Continue to monitor.

## 2020-02-11 NOTE — PROGRESS NOTES
Surgical Oncology Progress Note    Interval History:  No acute events overnight. Afebrile. Pain controlled.     Physical Exam:   Temp:  [96.2  F (35.7  C)-98.9  F (37.2  C)] 98.5  F (36.9  C)  Heart Rate:  [] 97  Resp:  [18] 18  BP: (113-137)/(52-72) 118/65  SpO2:  [94 %-97 %] 95 %  General: Alert, oriented, appears comfortable, NAD.  Respiratory: breathing non labored  Abdomen: Abdomen is soft, non-tender, non-distended. Incision is clean, dry and intact with staples.     Data:   All laboratory and imaging data in the past 24 hours reviewed  I/O last 3 completed shifts:  In: 3416.37 [P.O.:120; I.V.:1348.75]  Out: 5850 [Urine:5850]  Recent Labs   Lab Test 02/10/20  0732 02/09/20  0726 02/08/20  0732  01/29/20  0654   WBC 16.3* 15.7* 17.6*   < > 17.3*   HGB 10.1* 9.9* 10.0*   < > 11.9   * 526* 503*   < > 338   INR 1.21*  --  1.27*  --  1.28*    < > = values in this interval not displayed.      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 Now readmitted with RUQ pain, tachycardia and leukocytosis. CT with fluid collection in surgical bed and question of small bowel limb pneumatosis. Also found to have Ecoli bacteremia. Repeat CT with some increased pneumoperitoneum but no drainable fluid collection.      - Pain: oxycodone prn, dilaudid IV prn. Robaxin. Atarax prn.      - Home amitriptyline and citalopram restarted     - Question of small bowel  pneumatosis on CT scan 2/4. Improved on follow up CT scan 2/10. NPO, TPN/IVF.      - Abdominal pain with leukocytosis and tachycardia with intraabdominal fluid collection on CT. UA negative 2/4. EKG with sinus tachycardia 2/4. CXR with atelectasis. CT negative for PE 2/4. CT abdomen 2/4 with fluid collection surrounding pancreatic head and duodenum along with significant inflammatory changes of the duodenum and ascending colon possible pneumatosis. Blood cultures 2/4 positive for E.coli bacteremia. Follow up CT 2/10 with increased pneumoperitoneum but no drainable fluid collection. Cefepime/flagyl/microfungin started 2/5.     - Lovenox, protonix.     Seen with Chief resident who will discuss with Staff.     Pia Oneil PA-C   Surgical Oncology

## 2020-02-11 NOTE — PLAN OF CARE
A&OX4. VSS on room air. Abdominal pain control with PRN oxycodone and IV dilaudid given x2. NPO, denies N/V. Passing gas, BMx1 this shift, Voiding spontaneously, uses bedside commode. TPN and lipids infusing through PICC. Up with assist of one and a cane. Midline with staples with erythema. On IV antibiotics. Continue with plan of care and maintain pain control.

## 2020-02-11 NOTE — PLAN OF CARE
Tachycardic with HR in the low 100's otherwise AVSS. Pt reports abdominal pain, given 15 mg of oxycodone q 6 hours, 0.4 mg of IV Dilaudid q 2 hours, a flector patch and PRN atarax with some relief. Denies nausea, passing flatus and last BM was 2/9. NPO with TPN/lipds infusing via PICC. On scheduled reglan. Voiding adequate amounts. Up with assist of one and a cane. Midline with staples and a small amount of erythema. On multiple IV antibiotics. Paged surg onc team regarding pt's CT scan result as pt would like to talk to primary team regarding scan resutls, paged at 1600 with no return from surg onc team. Pt would now like to wait to talk to attending regarding CT tomorrow AM. Continue to monitor pain, VS, GI/ function, s/s of infection and leak.

## 2020-02-12 LAB
ANION GAP SERPL CALCULATED.3IONS-SCNC: 7 MMOL/L (ref 3–14)
BACTERIA SPEC CULT: NO GROWTH
BACTERIA SPEC CULT: NO GROWTH
BUN SERPL-MCNC: 12 MG/DL (ref 7–30)
CALCIUM SERPL-MCNC: 8.3 MG/DL (ref 8.5–10.1)
CHLORIDE SERPL-SCNC: 108 MMOL/L (ref 94–109)
CO2 SERPL-SCNC: 24 MMOL/L (ref 20–32)
CREAT SERPL-MCNC: 0.61 MG/DL (ref 0.52–1.04)
ERYTHROCYTE [DISTWIDTH] IN BLOOD BY AUTOMATED COUNT: 15.2 % (ref 10–15)
GFR SERPL CREATININE-BSD FRML MDRD: >90 ML/MIN/{1.73_M2}
GLUCOSE BLDC GLUCOMTR-MCNC: 118 MG/DL (ref 70–99)
GLUCOSE BLDC GLUCOMTR-MCNC: 122 MG/DL (ref 70–99)
GLUCOSE BLDC GLUCOMTR-MCNC: 130 MG/DL (ref 70–99)
GLUCOSE SERPL-MCNC: 135 MG/DL (ref 70–99)
HCT VFR BLD AUTO: 32.1 % (ref 35–47)
HGB BLD-MCNC: 9.8 G/DL (ref 11.7–15.7)
LACTATE BLD-SCNC: 0.7 MMOL/L (ref 0.7–2)
MAGNESIUM SERPL-MCNC: 2 MG/DL (ref 1.6–2.3)
MCH RBC QN AUTO: 27.7 PG (ref 26.5–33)
MCHC RBC AUTO-ENTMCNC: 30.5 G/DL (ref 31.5–36.5)
MCV RBC AUTO: 91 FL (ref 78–100)
PHOSPHATE SERPL-MCNC: 3 MG/DL (ref 2.5–4.5)
PLATELET # BLD AUTO: 566 10E9/L (ref 150–450)
POTASSIUM SERPL-SCNC: 3.6 MMOL/L (ref 3.4–5.3)
RBC # BLD AUTO: 3.54 10E12/L (ref 3.8–5.2)
SODIUM SERPL-SCNC: 140 MMOL/L (ref 133–144)
SPECIMEN SOURCE: NORMAL
SPECIMEN SOURCE: NORMAL
WBC # BLD AUTO: 14.5 10E9/L (ref 4–11)

## 2020-02-12 PROCEDURE — 25000132 ZZH RX MED GY IP 250 OP 250 PS 637: Mod: GY | Performed by: SURGERY

## 2020-02-12 PROCEDURE — 36592 COLLECT BLOOD FROM PICC: CPT | Performed by: SURGERY

## 2020-02-12 PROCEDURE — 84100 ASSAY OF PHOSPHORUS: CPT | Performed by: SURGERY

## 2020-02-12 PROCEDURE — 83605 ASSAY OF LACTIC ACID: CPT | Performed by: SURGERY

## 2020-02-12 PROCEDURE — 83735 ASSAY OF MAGNESIUM: CPT | Performed by: SURGERY

## 2020-02-12 PROCEDURE — 25000128 H RX IP 250 OP 636: Performed by: SURGERY

## 2020-02-12 PROCEDURE — 85027 COMPLETE CBC AUTOMATED: CPT | Performed by: PHYSICIAN ASSISTANT

## 2020-02-12 PROCEDURE — 80048 BASIC METABOLIC PNL TOTAL CA: CPT | Performed by: SURGERY

## 2020-02-12 PROCEDURE — 25000132 ZZH RX MED GY IP 250 OP 250 PS 637: Mod: GY | Performed by: STUDENT IN AN ORGANIZED HEALTH CARE EDUCATION/TRAINING PROGRAM

## 2020-02-12 PROCEDURE — 25800030 ZZH RX IP 258 OP 636: Performed by: STUDENT IN AN ORGANIZED HEALTH CARE EDUCATION/TRAINING PROGRAM

## 2020-02-12 PROCEDURE — 00000146 ZZHCL STATISTIC GLUCOSE BY METER IP

## 2020-02-12 PROCEDURE — 25000128 H RX IP 250 OP 636: Performed by: STUDENT IN AN ORGANIZED HEALTH CARE EDUCATION/TRAINING PROGRAM

## 2020-02-12 PROCEDURE — 12000001 ZZH R&B MED SURG/OB UMMC

## 2020-02-12 PROCEDURE — 25000125 ZZHC RX 250: Performed by: SURGERY

## 2020-02-12 RX ADMIN — CEFEPIME HYDROCHLORIDE 2 G: 2 INJECTION, POWDER, FOR SOLUTION INTRAVENOUS at 22:13

## 2020-02-12 RX ADMIN — Medication 0.4 MG: at 01:57

## 2020-02-12 RX ADMIN — Medication 0.4 MG: at 08:11

## 2020-02-12 RX ADMIN — AMITRIPTYLINE HYDROCHLORIDE 75 MG: 75 TABLET, FILM COATED ORAL at 21:02

## 2020-02-12 RX ADMIN — CEFEPIME HYDROCHLORIDE 2 G: 2 INJECTION, POWDER, FOR SOLUTION INTRAVENOUS at 09:40

## 2020-02-12 RX ADMIN — METOCLOPRAMIDE HYDROCHLORIDE 10 MG: 10 TABLET ORAL at 12:04

## 2020-02-12 RX ADMIN — Medication 0.4 MG: at 13:03

## 2020-02-12 RX ADMIN — Medication 0.4 MG: at 18:00

## 2020-02-12 RX ADMIN — Medication 0.4 MG: at 16:02

## 2020-02-12 RX ADMIN — METHOCARBAMOL 500 MG: 500 TABLET, FILM COATED ORAL at 08:12

## 2020-02-12 RX ADMIN — METRONIDAZOLE 500 MG: 500 INJECTION, SOLUTION INTRAVENOUS at 20:10

## 2020-02-12 RX ADMIN — PANTOPRAZOLE SODIUM 40 MG: 40 TABLET, DELAYED RELEASE ORAL at 08:12

## 2020-02-12 RX ADMIN — OXYCODONE HYDROCHLORIDE 15 MG: 5 TABLET ORAL at 02:31

## 2020-02-12 RX ADMIN — METHOCARBAMOL 500 MG: 500 TABLET, FILM COATED ORAL at 20:10

## 2020-02-12 RX ADMIN — OXYCODONE HYDROCHLORIDE 15 MG: 5 TABLET ORAL at 21:02

## 2020-02-12 RX ADMIN — SODIUM CHLORIDE 1000 ML: 9 INJECTION, SOLUTION INTRAVENOUS at 20:07

## 2020-02-12 RX ADMIN — METOCLOPRAMIDE HYDROCHLORIDE 10 MG: 10 TABLET ORAL at 08:12

## 2020-02-12 RX ADMIN — I.V. FAT EMULSION 250 ML: 20 EMULSION INTRAVENOUS at 20:11

## 2020-02-12 RX ADMIN — Medication 0.4 MG: at 22:17

## 2020-02-12 RX ADMIN — METHOCARBAMOL 500 MG: 500 TABLET, FILM COATED ORAL at 12:04

## 2020-02-12 RX ADMIN — METOCLOPRAMIDE HYDROCHLORIDE 10 MG: 10 TABLET ORAL at 16:02

## 2020-02-12 RX ADMIN — MICAFUNGIN SODIUM 100 MG: 10 INJECTION, POWDER, LYOPHILIZED, FOR SOLUTION INTRAVENOUS at 10:19

## 2020-02-12 RX ADMIN — METHOCARBAMOL 500 MG: 500 TABLET, FILM COATED ORAL at 16:02

## 2020-02-12 RX ADMIN — Medication 0.4 MG: at 20:08

## 2020-02-12 RX ADMIN — ENOXAPARIN SODIUM 40 MG: 40 INJECTION SUBCUTANEOUS at 10:19

## 2020-02-12 RX ADMIN — POTASSIUM CHLORIDE: 2 INJECTION, SOLUTION, CONCENTRATE INTRAVENOUS at 20:12

## 2020-02-12 RX ADMIN — METRONIDAZOLE 500 MG: 500 INJECTION, SOLUTION INTRAVENOUS at 01:48

## 2020-02-12 RX ADMIN — CITALOPRAM HYDROBROMIDE 20 MG: 20 TABLET ORAL at 08:12

## 2020-02-12 RX ADMIN — METRONIDAZOLE 500 MG: 500 INJECTION, SOLUTION INTRAVENOUS at 12:02

## 2020-02-12 RX ADMIN — OXYCODONE HYDROCHLORIDE 15 MG: 5 TABLET ORAL at 14:54

## 2020-02-12 RX ADMIN — METOCLOPRAMIDE HYDROCHLORIDE 10 MG: 10 TABLET ORAL at 21:02

## 2020-02-12 RX ADMIN — Medication 0.4 MG: at 10:19

## 2020-02-12 RX ADMIN — Medication 0.4 MG: at 05:51

## 2020-02-12 RX ADMIN — OXYCODONE HYDROCHLORIDE 15 MG: 5 TABLET ORAL at 08:49

## 2020-02-12 ASSESSMENT — PAIN DESCRIPTION - DESCRIPTORS
DESCRIPTORS: STABBING
DESCRIPTORS: CRAMPING;DISCOMFORT
DESCRIPTORS: ACHING
DESCRIPTORS: CRAMPING;DISCOMFORT

## 2020-02-12 ASSESSMENT — ACTIVITIES OF DAILY LIVING (ADL)
ADLS_ACUITY_SCORE: 14

## 2020-02-12 NOTE — PLAN OF CARE
"Status: s/p whipple 1/28 readmitted for RUQ pain, CT with pancreatic fluid collection and found to have Ecoli bacteremia.   Vitals: /64 (BP Location: Left arm)   Pulse 95   Temp 98.5  F (36.9  C) (Oral)   Resp 16   Ht 1.727 m (5' 8\")   Wt 85.4 kg (188 lb 3.2 oz)   LMP 09/23/2014   SpO2 98%   BMI 28.62 kg/m    Neuros: A&Ox4. Pleasant  IV: DL PICC on R arm. TPN/Lipids running and ABX/TKO  Resp/trach: CTA, non labored  Diet: Started clear liquids today, well tolerated  Bowel status: BS+, passing flatus, reports BM yesterday  : Voids w/o issues  Skin: Abdominal incision with staples w/ erythema  Pain: RUQ abdominal pain, receives Oxy Q6, dilaudid breakthorugh 0.4mg IV Q2 and has Flector patch placed on RUQ   Activity: AX1 with cane  Social:  came to visit  Plan: Continue plan of care     "

## 2020-02-12 NOTE — PLAN OF CARE
"/64 (BP Location: Left arm)   Pulse 95   Temp 98.5  F (36.9  C) (Oral)   Resp 16   Ht 1.727 m (5' 8\")   Wt 85.4 kg (188 lb 3.2 oz)   LMP 09/23/2014   SpO2 98%   BMI 28.62 kg/m      VSS on room air. Up with SBA with cane. Clear liquid diet, denies nausea. Pain noted in upper right quadrant, Oxycodone and dilaudid for pain management. PICC infusing TPN + Lipids, IV antibiotics and IVMF. Midline with staples, redness at site, no change. Patient reports passing flatus. Voiding adequately. Resting between cares. Continue with POC.  "

## 2020-02-12 NOTE — PROVIDER NOTIFICATION
Notified Dr. Millan via [facility] with this page:     FYI-pt's HR was 124, notifying parameter is 110. HR recheck was 105. OVSS on RA.

## 2020-02-12 NOTE — PLAN OF CARE
Patient's diet is advanced to full liquids, continues on TPN. TPN infusing via PICC line. Patient is up independently with cane. Continues to complain of abdominal pain, is taking oxycodone when due and also requesting IV dilaudid for breakthrough. Voiding, states she had a BM yesterday. Continues on IV antibiotics.

## 2020-02-12 NOTE — PROGRESS NOTES
Surgical Oncology Progress Note    Interval History:  No acute events overnight. Tolerating clear liquid diet without nausea or vomiting. Feeling hungry. Pain controlled. Passing flatus and stool.     Physical Exam:   Temp:  [97.4  F (36.3  C)-98.8  F (37.1  C)] 98.5  F (36.9  C)  Pulse:  [] 95  Heart Rate:  [104] 104  Resp:  [16] 16  BP: (109-117)/(60-64) 110/64  SpO2:  [95 %-98 %] 98 %  General: Alert, oriented, appears comfortable, NAD.  Respiratory: breathing non labored  Abdomen: Abdomen is soft, non-tender, non-distended. Incision is clean, dry and intact with staples.     Data:   All laboratory and imaging data in the past 24 hours reviewed  I/O last 3 completed shifts:  In: 440 [P.O.:400; I.V.:40]  Out: 2375 [Urine:2375]  Recent Labs   Lab Test 02/12/20  0657 02/11/20  0724 02/10/20  0732  02/08/20  0732  01/29/20  0654   WBC 14.5* 16.9* 16.3*   < > 17.6*   < > 17.3*   HGB 9.8* 10.2* 10.1*   < > 10.0*   < > 11.9   * 582* 559*   < > 503*   < > 338   INR  --   --  1.21*  --  1.27*  --  1.28*    < > = values in this interval not displayed.      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 Now readmitted with RUQ pain, tachycardia and leukocytosis. CT with fluid collection in surgical bed and question of small bowel limb pneumatosis. Also found to have Ecoli bacteremia. Repeat CT with some increased pneumoperitoneum but no drainable fluid  collection.       - Pain: oxycodone prn, dilaudid IV prn. Robaxin. Atarax prn.      - Home amitriptyline and citalopram restarted     - Question of small bowel pneumatosis on CT scan 2/4. Improved on follow up CT scan 2/10. Clear liquid diet, TPN, IVF.      - Abdominal pain with leukocytosis and tachycardia with intraabdominal fluid collection on CT. UA negative 2/4. EKG with sinus tachycardia 2/4. CXR with atelectasis. CT negative for PE 2/4. CT abdomen 2/4 with fluid collection surrounding pancreatic head and duodenum along with significant inflammatory changes of the duodenum and ascending colon possible pneumatosis. Blood cultures 2/4 positive for E.coli bacteremia. Follow up CT 2/10 with increased pneumoperitoneum but no drainable fluid collection. Cefepime/flagyl/microfungin started 2/5.     - Lovenox, protonix.     Seen with Chief resident who will discuss with Staff.     Pia Oneil PACliffC   Surgical Oncology

## 2020-02-13 LAB
ANION GAP SERPL CALCULATED.3IONS-SCNC: 4 MMOL/L (ref 3–14)
BUN SERPL-MCNC: 14 MG/DL (ref 7–30)
CALCIUM SERPL-MCNC: 8.6 MG/DL (ref 8.5–10.1)
CHLORIDE SERPL-SCNC: 106 MMOL/L (ref 94–109)
CO2 SERPL-SCNC: 26 MMOL/L (ref 20–32)
CREAT SERPL-MCNC: 0.6 MG/DL (ref 0.52–1.04)
ERYTHROCYTE [DISTWIDTH] IN BLOOD BY AUTOMATED COUNT: 15.4 % (ref 10–15)
GFR SERPL CREATININE-BSD FRML MDRD: >90 ML/MIN/{1.73_M2}
GLUCOSE SERPL-MCNC: 169 MG/DL (ref 70–99)
HCT VFR BLD AUTO: 33.9 % (ref 35–47)
HGB BLD-MCNC: 10.5 G/DL (ref 11.7–15.7)
LACTATE BLD-SCNC: 1.2 MMOL/L (ref 0.7–2)
MCH RBC QN AUTO: 27.9 PG (ref 26.5–33)
MCHC RBC AUTO-ENTMCNC: 31 G/DL (ref 31.5–36.5)
MCV RBC AUTO: 90 FL (ref 78–100)
PLATELET # BLD AUTO: 573 10E9/L (ref 150–450)
POTASSIUM SERPL-SCNC: 3.9 MMOL/L (ref 3.4–5.3)
RBC # BLD AUTO: 3.76 10E12/L (ref 3.8–5.2)
SODIUM SERPL-SCNC: 137 MMOL/L (ref 133–144)
WBC # BLD AUTO: 14 10E9/L (ref 4–11)

## 2020-02-13 PROCEDURE — 83605 ASSAY OF LACTIC ACID: CPT | Performed by: SURGERY

## 2020-02-13 PROCEDURE — 25000125 ZZHC RX 250: Performed by: SURGERY

## 2020-02-13 PROCEDURE — 25000128 H RX IP 250 OP 636: Performed by: SURGERY

## 2020-02-13 PROCEDURE — 25000132 ZZH RX MED GY IP 250 OP 250 PS 637: Mod: GY | Performed by: STUDENT IN AN ORGANIZED HEALTH CARE EDUCATION/TRAINING PROGRAM

## 2020-02-13 PROCEDURE — 36415 COLL VENOUS BLD VENIPUNCTURE: CPT | Performed by: SURGERY

## 2020-02-13 PROCEDURE — 25000132 ZZH RX MED GY IP 250 OP 250 PS 637: Mod: GY | Performed by: SURGERY

## 2020-02-13 PROCEDURE — 85027 COMPLETE CBC AUTOMATED: CPT | Performed by: NEUROLOGICAL SURGERY

## 2020-02-13 PROCEDURE — 40000802 ZZH SITE CHECK

## 2020-02-13 PROCEDURE — 12000001 ZZH R&B MED SURG/OB UMMC

## 2020-02-13 PROCEDURE — 36592 COLLECT BLOOD FROM PICC: CPT | Performed by: NEUROLOGICAL SURGERY

## 2020-02-13 PROCEDURE — 25000128 H RX IP 250 OP 636: Performed by: STUDENT IN AN ORGANIZED HEALTH CARE EDUCATION/TRAINING PROGRAM

## 2020-02-13 PROCEDURE — 80048 BASIC METABOLIC PNL TOTAL CA: CPT | Performed by: NEUROLOGICAL SURGERY

## 2020-02-13 RX ADMIN — METHOCARBAMOL 500 MG: 500 TABLET, FILM COATED ORAL at 12:29

## 2020-02-13 RX ADMIN — METOCLOPRAMIDE HYDROCHLORIDE 10 MG: 10 TABLET ORAL at 10:49

## 2020-02-13 RX ADMIN — METOCLOPRAMIDE HYDROCHLORIDE 10 MG: 10 TABLET ORAL at 21:47

## 2020-02-13 RX ADMIN — I.V. FAT EMULSION 250 ML: 20 EMULSION INTRAVENOUS at 20:22

## 2020-02-13 RX ADMIN — HYDROXYZINE HYDROCHLORIDE 10 MG: 10 TABLET ORAL at 09:47

## 2020-02-13 RX ADMIN — DICLOFENAC EPOLAMINE 1 PATCH: 0.01 PATCH TOPICAL at 05:58

## 2020-02-13 RX ADMIN — METRONIDAZOLE 500 MG: 500 INJECTION, SOLUTION INTRAVENOUS at 20:22

## 2020-02-13 RX ADMIN — CEFEPIME HYDROCHLORIDE 2 G: 2 INJECTION, POWDER, FOR SOLUTION INTRAVENOUS at 09:47

## 2020-02-13 RX ADMIN — OXYCODONE HYDROCHLORIDE 15 MG: 5 TABLET ORAL at 08:21

## 2020-02-13 RX ADMIN — Medication 0.4 MG: at 08:43

## 2020-02-13 RX ADMIN — Medication 0.4 MG: at 23:58

## 2020-02-13 RX ADMIN — METOCLOPRAMIDE HYDROCHLORIDE 10 MG: 10 TABLET ORAL at 17:04

## 2020-02-13 RX ADMIN — Medication 0.2 MG: at 12:54

## 2020-02-13 RX ADMIN — OXYCODONE HYDROCHLORIDE 15 MG: 5 TABLET ORAL at 14:24

## 2020-02-13 RX ADMIN — Medication 0.4 MG: at 15:58

## 2020-02-13 RX ADMIN — POTASSIUM CHLORIDE: 2 INJECTION, SOLUTION, CONCENTRATE INTRAVENOUS at 20:38

## 2020-02-13 RX ADMIN — CEFEPIME HYDROCHLORIDE 2 G: 2 INJECTION, POWDER, FOR SOLUTION INTRAVENOUS at 21:47

## 2020-02-13 RX ADMIN — AMITRIPTYLINE HYDROCHLORIDE 75 MG: 75 TABLET, FILM COATED ORAL at 21:47

## 2020-02-13 RX ADMIN — OXYCODONE HYDROCHLORIDE 15 MG: 5 TABLET ORAL at 20:22

## 2020-02-13 RX ADMIN — ENOXAPARIN SODIUM 40 MG: 40 INJECTION SUBCUTANEOUS at 09:47

## 2020-02-13 RX ADMIN — METHOCARBAMOL 500 MG: 500 TABLET, FILM COATED ORAL at 17:04

## 2020-02-13 RX ADMIN — METRONIDAZOLE 500 MG: 500 INJECTION, SOLUTION INTRAVENOUS at 04:20

## 2020-02-13 RX ADMIN — Medication 0.4 MG: at 21:47

## 2020-02-13 RX ADMIN — Medication 0.4 MG: at 18:14

## 2020-02-13 RX ADMIN — HEPARIN, PORCINE (PF) 10 UNIT/ML INTRAVENOUS SYRINGE 5 ML: at 22:30

## 2020-02-13 RX ADMIN — METRONIDAZOLE 500 MG: 500 INJECTION, SOLUTION INTRAVENOUS at 12:29

## 2020-02-13 RX ADMIN — PANTOPRAZOLE SODIUM 40 MG: 40 TABLET, DELAYED RELEASE ORAL at 08:22

## 2020-02-13 RX ADMIN — CITALOPRAM HYDROBROMIDE 20 MG: 20 TABLET ORAL at 08:22

## 2020-02-13 RX ADMIN — Medication 0.2 MG: at 10:49

## 2020-02-13 RX ADMIN — Medication 0.4 MG: at 05:35

## 2020-02-13 RX ADMIN — METOCLOPRAMIDE HYDROCHLORIDE 10 MG: 10 TABLET ORAL at 05:42

## 2020-02-13 RX ADMIN — METHOCARBAMOL 500 MG: 500 TABLET, FILM COATED ORAL at 20:22

## 2020-02-13 RX ADMIN — METHOCARBAMOL 500 MG: 500 TABLET, FILM COATED ORAL at 08:22

## 2020-02-13 RX ADMIN — Medication 0.4 MG: at 00:54

## 2020-02-13 ASSESSMENT — ACTIVITIES OF DAILY LIVING (ADL)
ADLS_ACUITY_SCORE: 14

## 2020-02-13 ASSESSMENT — PAIN DESCRIPTION - DESCRIPTORS
DESCRIPTORS: ACHING;SHARP
DESCRIPTORS: CRAMPING;DISCOMFORT
DESCRIPTORS: ACHING;SHARP

## 2020-02-13 ASSESSMENT — MIFFLIN-ST. JEOR: SCORE: 1501.5

## 2020-02-13 NOTE — PLAN OF CARE
Tolerating regular diet, passing gas,voiding spont. Abdominal incision dry/intact with staples.C/O pain  right upper abdomen, Oxycodone and Dilaudid iv for breakthrough pain with relief, up ad nelida in halls.PICC with TPN and iv antibiotics.

## 2020-02-13 NOTE — PLAN OF CARE
HR tachy, max 124, see provider notification note. OVSS on RA. Pt taking sched robaxin, prn oxy, and prn IV dilaudid for abd pain management. Midline incision w/ staples. Skin surrounding staples w/ some erythema. Passing gas, x1 BM. Voiding spont, uses bedside commode. FLD, denies nausea. UAL. Double lumen PICC infusing cont TPN and lipids and MIVF. Cont to monitor HR, pain management, and PO intake. Cont w/ POC.

## 2020-02-13 NOTE — PLAN OF CARE
VSS on RA. Pain in abdomen controlled with PRN dilaudid this shift. Midline incision with staples. Erythema present on skin surrounding staples.JAZZMINE sites to right abdomen, scabbed over. Voiding spontaneously, uses bedside commode; up independently with cane. Denies nausea. Double lumen PICC infusing TPN and lipids, NS, and IV antibiotics. Full liquid diet, nausea noted x1. Continue with plan of care.

## 2020-02-13 NOTE — PROGRESS NOTES
Surgery  Progress Note  02/13/20    Interval Events:    No acute events. Still mild RUQ pain. Tolerating full liquids. Would like to try regular diet.    Objective:  Vitals reviewed  I/O reviewed  Appears comfortable  Non-labored breathing  Abdomen soft, non-tender    Labs reviewed    No new imaging    Assessment:  54 year old female s/p whipple now with some small fluid collections who presented to the hospital with leukocytosis.    Plan:    Pain control  Regular diet; TPN  Strict I/O  Cefepime, flagyl - will need iv abx at discharge  PPI  Lovenox ppx    Ti Mckeon MD  General Surgery ([id number])  Pager [phone]

## 2020-02-13 NOTE — PROGRESS NOTES
Antimicrobial Stewardship Team Note    Antimicrobial Stewardship Program - A joint venture between [facility] and  Physicians to optimize antibiotic management.  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     HPI: Please see AMT note from 2/6 for more details of patient's admission history. Patient was found to have pan-sensitive E coli bacteremia from blood cultures drawn on 2/4. CT abdomen on 2/4 showed peripherally enhancing fluid collections surrounding the pancreatic head and duodenum extending inferiorly into the right lower quadrant. CT abdomen pelvis with IV contrast was repeated on 2/10 due to increasing leukocytosis (WBC 16.3) and tachycardia (HR up to 110), which showed fluid collections stable to decreased in size with foci of air and increased peripheral enhancement concerning for abscess. Fluid collections are not amenable to drainage. Patient has been on broad spectrum IV antimicrobials with cefepime, metronidazole, and micafungin since 2/5 for E. coli bacteremia with suspected postoperative intraabdominal abscess source. Micafungin was discontinued today (2/13). Repeat blood cultures from 2/6 have no growth.  Patient's  leukocytosis has since decreased (14.0 on 2/13) and she has remained afebrile. Plan per surgical oncology team is to discharge patient on IV cefepime and metronidazole. Patient was previously on TPN but has been advanced to adult, regular diet today (2/13) and is taking oral medications.          Active Anti-infective Medications   (From admission, onward)                Start     Stop    02/05/20 1000  micafungin (MYCAMINE) injection  100 mg,   Intravenous,   100 mL/hr,   EVERY 24 HOURS     Candidiasis    intraabdominal infection from upper GI source        --    02/05/20 0900  ceFEPIme  2 g,   Intravenous,   EVERY 8 HOURS     Intra-Abdominal Infection        --    02/04/20 2300  metroNIDAZOLE  500 mg,   Intravenous,   EVERY 8 HOURS     Postoperative Infection        --          Assessment: [E. Coli bacteremia, resolved and probable postoperative intraabdominal abscess].  Patient has cleared E. coli bacteremia evidenced by negative repeat blood cultures. Patient is on day 9 of cefepime, which has sufficiently treated pan-sensitive E. coli bacteremia. Patient has clinically improved as evidenced by decreasing leukocytosis but till concerned for lack of source control with probable intraabdominal abscess and possible perforation with free air noted on repeat imaging. Intraabdominal infection is likely polymicrobial and is not amendable to source control with drainage currently. Agree with continuation of empiric gram-negative and anaerobic coverage until resolution of intraabdominal process. Patient is at risk for infection with intraabdominal yeast due to recent surgery and prolonged antibiotic use. Recommend restarting antifungal coverage for patient. Patient lacks history of significant azole use or past growth of resistant fungus. Recommend fluconazole for empiric fungal coverage. Patient lacks history of Pseudomonas or multi-drug resistant organisms. Recommend narrowing therapy from cefepime with preference to  transition to oral antibiotics. Patient has been tolerating oral medications and her diet has been advanced suggesting it would be appropriate to transition to oral antimicrobial therapy with high bioavailability agents. Metronidazole, amoxicillin-clavulanate, and fluconazole all have high oral bioavailability. Recommend oral antimicrobial therapy with amoxicillin-clavulanate 875-125 mg every 12 hours, metronidazole 500 mg every 8 hours, and fluconazole 400 mg every 24 hours. If preferred to continue patient of IV antimicrobials, recommend either ceftriaxone 2 g every 24 hours, metronidazole 500 mg every 8 hours, and fluconazole 400 mg every 24 hours OR ampicillin/sulbactam 3 g every 6 hours and fluconazole. Given barriers to source control and potential for extended duration of outpatient antimicrobials, consider infectious diseases consult.     Recommendations:  Discontinue cefepime. Transition patient to oral therapy with amoxicillin-clavulanate and fluconazole.   Consider IV therapy with ceftriaxone + metronidazole + fluconazole OR ampicillin/sulbactam + fluconazole, if preferred.   Consider infectious disease consult     Discussed with ID Staff, Bebeto Maxwell MD and ID pharmacy staff, Tessie Mcclure [facility]  Princess QuezadaD IV Candidate    Vital Signs/Clinical Features:  Vitals         02/11 0700  -  02/12 0659 02/12 0700  -  02/13 0659 02/13 0700  -  02/13 1315   Most Recent    Temp ( F) 97.4 -  98.8    97.5 -  98.6      95.9     95.9 (35.5)    Pulse 95 -  104      108 -  114     108    Heart Rate   104    94 -  124       97    Resp   16    16 -  18      16     16    /63 -  123/64    102/48 -  119/66      117/70     117/70    SpO2 (%) 95 -  98    95 -  98      96     96            Labs  Estimated Creatinine Clearance: 123.8 mL/min (based on SCr of 0.6 mg/dL).  Recent Labs   Lab Test 02/08/20  0732 02/09/20  0726 02/10/20  0732 02/11/20  0724 02/12/20  0657 02/13/20  0711   CR 0.64 0.61 0.58 0.64 0.61 0.60        Recent Labs   Lab Test 02/06/20  0718 02/07/20  0715 02/08/20  0732 02/09/20  0726 02/10/20  0732 02/11/20  0724 02/12/20  0657 02/13/20  0711   WBC 20.3* 15.6* 17.6* 15.7* 16.3* 16.9* 14.5* 14.0*   ANEU 17.3* 12.5* 14.4* 12.2* 12.1* 11.9*  --   --    ALYM 1.0 1.4 1.4 1.7 1.9 2.2  --   --    LENIN 1.3 1.0 0.9 1.0 1.1 1.3  --   --    AEOS 0.3 0.5 0.5 0.4 0.5 0.7  --   --    HGB 9.3* 9.7* 10.0* 9.9* 10.1* 10.2* 9.8* 10.5*   HCT 28.4* 30.0* 30.9* 31.1* 32.1* 32.2* 32.1* 33.9*   MCV 88 87 88 88 88 88 91 90    515* 503* 526* 559* 582* 566* 573*       Recent Labs   Lab Test 01/29/20  0701 01/30/20  0711 02/04/20  1616 02/06/20  0718 02/08/20  0732 02/10/20  0732   BILITOTAL 0.4 0.6 0.5 0.7 0.3 0.1*   ALKPHOS 92 108 283* 200* 186* 150   ALBUMIN 2.8* 2.4* 2.6* 1.7* 2.0* 2.0*   AST 35 36 29 12 18 24   ALT 33 28 31 16 13 13       Recent Labs   Lab Test 10/26/16  0900 11/02/16  1200 11/09/16  1100 11/23/16  0900 11/30/16  1130 12/07/16  1200 02/04/20  1616 02/04/20  1645   LACT  --   --   --   --   --   --  0.8 0.8   CRP 11.6* 9.6* 7.2 5.5 <2.9 14.3*  --   --              Culture Results:  7-Day Micro Results       ** No results found for the last 168 hours. **            Recent Labs   Lab Test 01/30/20  1700 02/04/20  1828   URINEPH 6.5 7.0   NITRITE Negative Negative   LEUKEST Trace* Negative   WBCU 5 2                         Imaging: Xr Chest Port 1 View    Result Date: 2/7/2020  EXAM: XR CHEST PORT 1 VW  2/7/2020 1:21 PM HISTORY:  PICC placement   COMPARISON:  2/4/2020 FINDINGS: Single frontal radiograph of the chest. Right upper extremity PICC tip projects in the right atrium. The trachea is midline. The cardiomediastinal silhouette is well-defined. The pulmonary vasculature are distinct. The included osseous structures, soft tissues and upper abdomen and are within normal limits. No acute airspace opacity, pleural effusion or appreciable pneumothorax.     IMPRESSION: Right upper extremity PICC tip projects  in the right atrium. No acute airspace opacity.. I have personally reviewed the examination and initial interpretation and I agree with the findings. WILLIE SONI MD    Ct Abdomen Pelvis W Contrast    Result Date: 2/10/2020  EXAMINATION: CT ABDOMEN PELVIS W CONTRAST, 2/10/2020 10:42 AM TECHNIQUE: Helical CT images from the lung bases through the symphysis pubis were obtained with IV contrast. Contrast dose: Iopamidol (ISOVUE-370) solution 115 mL COMPARISON: 2/4/2020, 12/18/2019. HISTORY: Increased WBC and tachycardia. Evaluate intra-abdominal fluid collection. s/p Whipple 1/28, readmitted with intraabdominal fluid collection and bacteremia on IV abx. FINDINGS: Abdomen and pelvis: Postsurgical changes of Whipple procedure. There is grossly stable mild dilation of the main pancreatic duct. No focal pancreatic mass identified. Mild pneumobilia, grossly stable. Adjacent to both the pancreaticojejunostomy and hepaticojejunostomy, there is new extraluminal intraperitoneal air. Questionable bowel wall defect in the jejunal loop near the hepaticojejunostomy (series 3, image 51). The previously seen peripancreatic and right upper quadrant fluid collections appear grossly stable in size, although the tubular collection superior to the pancreatic body/tail has decreased in size. The remainder of the fluid collections demonstrate increased peripheral enhancement, with the largest measuring up to 4.2 cm in greatest axial dimension inferior to the right hepatic lobe (series 5, image 171). No focal liver lesion. Cholecystectomy. The spleen, adrenal glands, and kidneys appear normal. No hydronephrosis. Mild urothelial enhancement in the ureters bilaterally, right greater than left, which is new or increased since the previous exam and presumably represents inflammatory change. No significant intra-abdominal free fluid. No abnormally dilated loops of bowel. The previously seen inflammatory changes in the jejunum adjacent to the  stenoses appear stable or mildly improved. The appendix is normal. Scattered colonic diverticulosis. Normal caliber abdominal aorta. The major abdominal vasculature appears patent. The upper SMV appears mildly narrowed as it courses through the region of inflammation posterior and inferior to the pancreas. Unchanged scattered prominent peripancreatic and retroperitoneal nodes. Lower chest: The heart is nonenlarged. Partially imaged central venous catheter with the tip positioned in the high right atrium. No pericardial effusion. There is continued air in the anterior pericardiophrenic fat. Slightly increased prominence of a subcentimeter pericardiophrenic lymph node (series 5, image 57). Bibasilar atelectasis. Bones and soft tissues: Healing midline laparotomy incision. Left hip total arthroplasty. Metallic device at the left sacroiliac joint, presumably fusion device. Transitional anatomy at the lumbosacral junction with sacralization on the left L5. No acute or worrisome osseous lesions.     IMPRESSION: 1. Surgical changes of Whipple procedure with increased small volume pneumoperitoneum. No definite bowel perforation is identified although there are foci of air near both the pancreaticojejunostomy and hepaticojejunostomy. Questionable bowel wall defect in the jejunal loop near the hepaticojejunostomy. 2. The previously seen fluid collections in the upper abdomen appear stable to slightly decreased in size, however there is increased peripheral enhancement, again raising the question of abscess. 3. Diffuse inflammatory changes in the upper abdomen appear relatively stable, with new mild narrowing of the upper SMV. [Urgent Result: Increased pneumoperitoneum] Finding was identified on 2/10/2020 10:55 AM. Pia Oneil PA-C was contacted by Dr. Luna at 2/10/2020 11:52 AM and verbalized understanding of the urgent finding. I have personally reviewed the examination and initial interpretation and I agree with the  findings. LOU JUNIOR, DO

## 2020-02-14 LAB
ANION GAP SERPL CALCULATED.3IONS-SCNC: 6 MMOL/L (ref 3–14)
BUN SERPL-MCNC: 15 MG/DL (ref 7–30)
CALCIUM SERPL-MCNC: 8.7 MG/DL (ref 8.5–10.1)
CHLORIDE SERPL-SCNC: 106 MMOL/L (ref 94–109)
CO2 SERPL-SCNC: 25 MMOL/L (ref 20–32)
CREAT SERPL-MCNC: 0.64 MG/DL (ref 0.52–1.04)
ERYTHROCYTE [DISTWIDTH] IN BLOOD BY AUTOMATED COUNT: 15.3 % (ref 10–15)
ERYTHROCYTE [DISTWIDTH] IN BLOOD BY AUTOMATED COUNT: NORMAL % (ref 10–15)
GFR SERPL CREATININE-BSD FRML MDRD: >90 ML/MIN/{1.73_M2}
GLUCOSE SERPL-MCNC: 139 MG/DL (ref 70–99)
HCT VFR BLD AUTO: 38 % (ref 35–47)
HCT VFR BLD AUTO: NORMAL % (ref 35–47)
HGB BLD-MCNC: 11.9 G/DL (ref 11.7–15.7)
HGB BLD-MCNC: NORMAL G/DL (ref 11.7–15.7)
MAGNESIUM SERPL-MCNC: 2.2 MG/DL (ref 1.6–2.3)
MCH RBC QN AUTO: 27.8 PG (ref 26.5–33)
MCH RBC QN AUTO: NORMAL PG (ref 26.5–33)
MCHC RBC AUTO-ENTMCNC: 31.3 G/DL (ref 31.5–36.5)
MCHC RBC AUTO-ENTMCNC: NORMAL G/DL (ref 31.5–36.5)
MCV RBC AUTO: 89 FL (ref 78–100)
MCV RBC AUTO: NORMAL FL (ref 78–100)
PHOSPHATE SERPL-MCNC: 3.4 MG/DL (ref 2.5–4.5)
PLATELET # BLD AUTO: 608 10E9/L (ref 150–450)
PLATELET # BLD AUTO: NORMAL 10E9/L (ref 150–450)
POTASSIUM SERPL-SCNC: 4.1 MMOL/L (ref 3.4–5.3)
RBC # BLD AUTO: 4.28 10E12/L (ref 3.8–5.2)
RBC # BLD AUTO: NORMAL 10E12/L (ref 3.8–5.2)
SODIUM SERPL-SCNC: 137 MMOL/L (ref 133–144)
WBC # BLD AUTO: 13.4 10E9/L (ref 4–11)
WBC # BLD AUTO: NORMAL 10E9/L (ref 4–11)

## 2020-02-14 PROCEDURE — 85027 COMPLETE CBC AUTOMATED: CPT | Performed by: SURGERY

## 2020-02-14 PROCEDURE — 83735 ASSAY OF MAGNESIUM: CPT | Performed by: SURGERY

## 2020-02-14 PROCEDURE — 84100 ASSAY OF PHOSPHORUS: CPT | Performed by: SURGERY

## 2020-02-14 PROCEDURE — 36592 COLLECT BLOOD FROM PICC: CPT | Performed by: SURGERY

## 2020-02-14 PROCEDURE — 25000132 ZZH RX MED GY IP 250 OP 250 PS 637: Mod: GY | Performed by: SURGERY

## 2020-02-14 PROCEDURE — 25000128 H RX IP 250 OP 636: Performed by: NEUROLOGICAL SURGERY

## 2020-02-14 PROCEDURE — 80048 BASIC METABOLIC PNL TOTAL CA: CPT | Performed by: SURGERY

## 2020-02-14 PROCEDURE — 25000132 ZZH RX MED GY IP 250 OP 250 PS 637: Mod: GY | Performed by: PHYSICIAN ASSISTANT

## 2020-02-14 PROCEDURE — 40000558 ZZH STATISTIC CVC DRESSING CHANGE

## 2020-02-14 PROCEDURE — 25000132 ZZH RX MED GY IP 250 OP 250 PS 637: Mod: GY | Performed by: STUDENT IN AN ORGANIZED HEALTH CARE EDUCATION/TRAINING PROGRAM

## 2020-02-14 PROCEDURE — 12000001 ZZH R&B MED SURG/OB UMMC

## 2020-02-14 PROCEDURE — 36415 COLL VENOUS BLD VENIPUNCTURE: CPT | Performed by: SURGERY

## 2020-02-14 PROCEDURE — 25000128 H RX IP 250 OP 636: Performed by: SURGERY

## 2020-02-14 PROCEDURE — 25000128 H RX IP 250 OP 636: Performed by: STUDENT IN AN ORGANIZED HEALTH CARE EDUCATION/TRAINING PROGRAM

## 2020-02-14 PROCEDURE — 25000132 ZZH RX MED GY IP 250 OP 250 PS 637: Mod: GY | Performed by: NEUROLOGICAL SURGERY

## 2020-02-14 RX ORDER — HYDROMORPHONE HCL/0.9% NACL/PF 0.2MG/0.2
.2-.4 SYRINGE (ML) INTRAVENOUS
Status: DISCONTINUED | OUTPATIENT
Start: 2020-02-14 | End: 2020-02-15 | Stop reason: HOSPADM

## 2020-02-14 RX ORDER — HYDROMORPHONE HYDROCHLORIDE 1 MG/ML
0.4 INJECTION, SOLUTION INTRAMUSCULAR; INTRAVENOUS; SUBCUTANEOUS ONCE
Status: COMPLETED | OUTPATIENT
Start: 2020-02-14 | End: 2020-02-14

## 2020-02-14 RX ADMIN — HYDROXYZINE HYDROCHLORIDE 10 MG: 10 TABLET ORAL at 11:03

## 2020-02-14 RX ADMIN — METOCLOPRAMIDE HYDROCHLORIDE 10 MG: 10 TABLET ORAL at 22:52

## 2020-02-14 RX ADMIN — METHOCARBAMOL 500 MG: 500 TABLET, FILM COATED ORAL at 19:58

## 2020-02-14 RX ADMIN — METOCLOPRAMIDE HYDROCHLORIDE 10 MG: 10 TABLET ORAL at 10:59

## 2020-02-14 RX ADMIN — CITALOPRAM HYDROBROMIDE 20 MG: 20 TABLET ORAL at 08:41

## 2020-02-14 RX ADMIN — OXYCODONE HYDROCHLORIDE 15 MG: 5 TABLET ORAL at 09:08

## 2020-02-14 RX ADMIN — HYDROXYZINE HYDROCHLORIDE 10 MG: 10 TABLET ORAL at 16:20

## 2020-02-14 RX ADMIN — AMITRIPTYLINE HYDROCHLORIDE 75 MG: 75 TABLET, FILM COATED ORAL at 22:52

## 2020-02-14 RX ADMIN — Medication 0.4 MG: at 16:58

## 2020-02-14 RX ADMIN — SENNOSIDES AND DOCUSATE SODIUM 1 TABLET: 8.6; 5 TABLET ORAL at 13:07

## 2020-02-14 RX ADMIN — METOCLOPRAMIDE HYDROCHLORIDE 10 MG: 10 TABLET ORAL at 16:20

## 2020-02-14 RX ADMIN — METHOCARBAMOL 500 MG: 500 TABLET, FILM COATED ORAL at 08:40

## 2020-02-14 RX ADMIN — AMOXICILLIN AND CLAVULANATE POTASSIUM 1 TABLET: 875; 125 TABLET, FILM COATED ORAL at 10:59

## 2020-02-14 RX ADMIN — OXYCODONE HYDROCHLORIDE 15 MG: 5 TABLET ORAL at 15:16

## 2020-02-14 RX ADMIN — HYDROMORPHONE HYDROCHLORIDE 0.4 MG: 1 INJECTION, SOLUTION INTRAMUSCULAR; INTRAVENOUS; SUBCUTANEOUS at 14:22

## 2020-02-14 RX ADMIN — METOCLOPRAMIDE HYDROCHLORIDE 10 MG: 10 TABLET ORAL at 08:41

## 2020-02-14 RX ADMIN — ENOXAPARIN SODIUM 40 MG: 40 INJECTION SUBCUTANEOUS at 10:59

## 2020-02-14 RX ADMIN — METRONIDAZOLE 500 MG: 500 INJECTION, SOLUTION INTRAVENOUS at 04:40

## 2020-02-14 RX ADMIN — DICLOFENAC EPOLAMINE 1 PATCH: 0.01 PATCH TOPICAL at 10:53

## 2020-02-14 RX ADMIN — AMOXICILLIN AND CLAVULANATE POTASSIUM 1 TABLET: 875; 125 TABLET, FILM COATED ORAL at 19:58

## 2020-02-14 RX ADMIN — PANTOPRAZOLE SODIUM 40 MG: 40 TABLET, DELAYED RELEASE ORAL at 08:40

## 2020-02-14 RX ADMIN — METHOCARBAMOL 500 MG: 500 TABLET, FILM COATED ORAL at 13:07

## 2020-02-14 RX ADMIN — Medication 0.4 MG: at 06:55

## 2020-02-14 RX ADMIN — Medication 0.4 MG: at 04:40

## 2020-02-14 RX ADMIN — OXYCODONE HYDROCHLORIDE 15 MG: 5 TABLET ORAL at 02:58

## 2020-02-14 RX ADMIN — METHOCARBAMOL 500 MG: 500 TABLET, FILM COATED ORAL at 16:20

## 2020-02-14 RX ADMIN — Medication 0.4 MG: at 02:14

## 2020-02-14 RX ADMIN — Medication 0.4 MG: at 19:58

## 2020-02-14 RX ADMIN — Medication 0.4 MG: at 22:52

## 2020-02-14 ASSESSMENT — ACTIVITIES OF DAILY LIVING (ADL)
ADLS_ACUITY_SCORE: 14

## 2020-02-14 ASSESSMENT — MIFFLIN-ST. JEOR: SCORE: 1499

## 2020-02-14 ASSESSMENT — PAIN DESCRIPTION - DESCRIPTORS
DESCRIPTORS: ACHING

## 2020-02-14 NOTE — DISCHARGE SUMMARY
Surgery  Discharge Summary  02/14/20    Nathalie Bashir  [id number]    Resident: Wili Mckeon  Staff: Dr. 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  Peripancreatic fluid on CT which is most likely postoperative changes versus less likely developing abscess.    Discharge Condition:  stable    Procedures:  PICC line    Hospital Course:  Complained of constant right upper abdominal pain throughout the hospitalization that responded to pain medications. Pain improved prior to discharge. No respriatory issues. Mild tachycardia which improved though hospitalization. On TPN thoughout the hospitalization which was weaned prior to discharge. Started on broad spectrum abx and micafungin on admission which was narrowed to oral antibitoics prior to discharge. Had some e coli bacteremia which was pan sensitive. Leukocytosis improved. Voiding. Tolerating diet. Having bowel movements.    Discharge Physical:  Vitals:    02/13/20 1537 02/13/20 2210 02/14/20 0003 02/14/20 0823   BP: 103/62 99/57 108/58 99/53   BP Location: Left arm Left arm Left arm Left arm   Pulse:  102     Resp: 16 16 16 18   Temp: 98.9  F (37.2  C) 97.6  F (36.4  C) 97.6  F (36.4  C) 97.2  F (36.2  C)   TempSrc: Oral Oral Oral Axillary   SpO2: 96% 96% 96% 97%   Weight:       Height:         General: no acute distress, appears comfortable  Resp: non-labored  Abdomen: soft, non-tender; staples removed  Extremities: warm, well perfused    Consults:  None    Imaging:  Results for orders placed or performed during the hospital encounter of 02/04/20   XR Chest 2 Views    Narrative    EXAM: XR CHEST 2 VW  2/4/2020 5:58 PM      HISTORY: SOB    COMPARISON: None available    FINDINGS: PA and lateral radiographs of the chest. Trachea is midline,  heart size is normal. Streaky  right basilar opacity. No pneumothorax  or pleural effusion. No acute osseous or abdominal abnormality.  Surgical clips projecting over the mid abdomen.      Impression    IMPRESSION: Streaky right basilar opacity, favor atelectasis over  consolidation.         I have personally reviewed the examination and initial interpretation  and I agree with the findings.    CHAMP THOMASON MD   CT Abdomen Pelvis w Contrast    Narrative    EXAMINATION: CT ABDOMEN PELVIS W CONTRAST, 2/4/2020 6:19 PM    TECHNIQUE:  Helical CT images from the lung bases through the  symphysis pubis were obtained with IV contrast. Contrast dose:  iopamidol (ISOVUE-370) solution 122 mL    COMPARISON: 12/18/2019    HISTORY: abdominal pain, recent whipple    Additional history: Status post Whipple on 1/28/2020, discharged this  morning.    FINDINGS:    LUNG BASES: Refer to separate CT chest same day.    ABDOMEN/PELVIS: Status post gastric bypass and Whipple with trace  postoperative pneumoperitoneum. Hazy stranding surrounding the  duodenum and pancreatic head with adjacent, mildly peripherally,  predominantly simple enhancing fluid collection extending to the right  lower abdominal quadrant with adjacent inflammatory changes of the  descending colon. The fluid collection extends medially surrounding  the celiac and SMA. No extravasation of contrast noted. Trace free  fluid surrounding the liver. Unremarkable hepatic parenchyma. Trace  pneumobilia. Portal vasculature is widely patent. Spleen, adrenals,  and kidneys are unremarkable. Ureters are normal along their course.  Edematous, enhancing, and thickened wall of the duodenum with  downstream decompression. Air along the anterior margin of the  duodenum is likely intraluminal. Colonic diverticulosis. Major  abdominal vasculature is widely patent. Normal caliber abdominal  aorta. Reactive abdominal mesenteric lymphadenopathy. No suspicious  pelvic mass. Small amount of free fluid in the pelvis. No  concerning  inguinal or pelvic lymphadenopathy. Bladder is unremarkable.    BONES: Significant appearance of the bones. No acute or suspicious  bony abnormality. Status post left total hip arthroplasty and left  sacroiliac fixation.      Impression    IMPRESSION:  1. Status post Whipple with peripherally enhancing, predominantly  simple fluid collections surrounding the pancreatic head and duodenum  extending inferiorly into the right lower abdominal quadrant,  suggesting developing abscess. No evidence of active abdominal  hemorrhage.   2. Significant inflammatory changes of the duodenum and ascending  colon adjacent to the fluid collections, likely reactive. The air  within the duodenum is most likely intraluminal and not pneumatosis  since it is only visualized in the anterior portions of the duodenum.  Close attention on follow-up.  3. Trace pneumoperitoneum and pneumobilia are likely postoperative.    I have personally reviewed the examination and initial interpretation  and I agree with the findings.    CHAMP THOMASON MD   CT Chest Pulmonary Embolism w Contrast    Narrative    Exam: Chest CT Angiogram with 3-dimensional reconstruction  2/4/2020.    Comparison: 12/18/2019     Clinical History: Shortness of breath. Evaluate for pulmonary  embolism.    Technique: Volumetric helical acquisition of the chest were obtained  following pulmonary embolism protocol from the lung apices through the  upper abdomen. The pulmonary arteries are well-opacified to evaluate  for pulmonary embolism.    3-dimensional post processed angiographic images were reconstructed,  archived to PACS and used in the interpretation of this study.    Findings:  Mediastinum/hilum: The heart size is normal. The great vessels are  normal in caliber. There is no evidence of a filling defect in the  pulmonary arteries to suggest a pulmonary embolism. No evidence of  axillary, mediastinal or hilar lymphadenopathy by size criteria. No  pericardial  effusion is noted. Small pneumomediastinum.    Lungs/pleura: Atelectasis in the posterior lower lobes. No  consolidation or suspicious pulmonary nodule.    Upper Abdomen: Partially visualized postoperative changes of Whipple's  procedure with fatty stranding surrounding the pancreas. Mild ascites.  Pneumobilia.    Bones/Soft tissues: No suspicious bony lesions.      Impression    Impression:  1. No pulmonary embolism.  2. Small pneumomediastinum and pneumoperitoneum, likely postsurgical.  3. Partially visualized post surgical changes of Whipple's with a  small amount of pneumoperitoneum, and fat stranding surrounding the  pancreas. There are seen on same-day abdominal CT.    I have personally reviewed the examination and initial interpretation  and I agree with the findings.    CHAMP THOMASON MD   XR Chest Port 1 View    Narrative    EXAM: XR CHEST PORT 1 VW  2/7/2020 1:21 PM     HISTORY:  PICC placement       COMPARISON:  2/4/2020    FINDINGS:   Single frontal radiograph of the chest. Right upper extremity PICC tip  projects in the right atrium.    The trachea is midline. The cardiomediastinal silhouette is  well-defined. The pulmonary vasculature are distinct.    The included osseous structures, soft tissues and upper abdomen and  are within normal limits.     No acute airspace opacity, pleural effusion or appreciable  pneumothorax.      Impression    IMPRESSION: Right upper extremity PICC tip projects in the right  atrium. No acute airspace opacity..     I have personally reviewed the examination and initial interpretation  and I agree with the findings.    WILLIE SONI MD   CT Abdomen Pelvis w Contrast     Value    Radiologist flags Increased pneumoperitoneum (Urgent)    Narrative    EXAMINATION: CT ABDOMEN PELVIS W CONTRAST, 2/10/2020 10:42 AM    TECHNIQUE: Helical CT images from the lung bases through the symphysis  pubis were obtained with IV contrast. Contrast dose: Iopamidol  (ISOVUE-370) solution 115  mL    COMPARISON: 2/4/2020, 12/18/2019.    HISTORY: Increased WBC and tachycardia. Evaluate intra-abdominal fluid  collection. s/p Whipple 1/28, readmitted with intraabdominal fluid  collection and bacteremia on IV abx.    FINDINGS:    Abdomen and pelvis:   Postsurgical changes of Whipple procedure. There is grossly stable  mild dilation of the main pancreatic duct. No focal pancreatic mass  identified. Mild pneumobilia, grossly stable. Adjacent to both the  pancreaticojejunostomy and hepaticojejunostomy, there is new  extraluminal intraperitoneal air. Questionable bowel wall defect in  the jejunal loop near the hepaticojejunostomy (series 3, image 51).  The previously seen peripancreatic and right upper quadrant fluid  collections appear grossly stable in size, although the tubular  collection superior to the pancreatic body/tail has decreased in size.  The remainder of the fluid collections demonstrate increased  peripheral enhancement, with the largest measuring up to 4.2 cm in  greatest axial dimension inferior to the right hepatic lobe (series 5,  image 171).    No focal liver lesion. Cholecystectomy. The spleen, adrenal glands,  and kidneys appear normal. No hydronephrosis. Mild urothelial  enhancement in the ureters bilaterally, right greater than left, which  is new or increased since the previous exam and presumably represents  inflammatory change.     No significant intra-abdominal free fluid. No abnormally dilated loops  of bowel. The previously seen inflammatory changes in the jejunum  adjacent to the stenoses appear stable or mildly improved. The  appendix is normal. Scattered colonic diverticulosis. Normal caliber  abdominal aorta. The major abdominal vasculature appears patent. The  upper SMV appears mildly narrowed as it courses through the region of  inflammation posterior and inferior to the pancreas. Unchanged  scattered prominent peripancreatic and retroperitoneal nodes.    Lower chest:   The  heart is nonenlarged. Partially imaged central venous catheter  with the tip positioned in the high right atrium. No pericardial  effusion. There is continued air in the anterior pericardiophrenic  fat. Slightly increased prominence of a subcentimeter  pericardiophrenic lymph node (series 5, image 57). Bibasilar  atelectasis.    Bones and soft tissues:   Healing midline laparotomy incision. Left hip total arthroplasty.  Metallic device at the left sacroiliac joint, presumably fusion  device. Transitional anatomy at the lumbosacral junction with  sacralization on the left L5. No acute or worrisome osseous lesions.         Impression    IMPRESSION:   1. Surgical changes of Whipple procedure with increased small volume  pneumoperitoneum. No definite bowel perforation is identified although  there are foci of air near both the pancreaticojejunostomy and  hepaticojejunostomy. Questionable bowel wall defect in the jejunal  loop near the hepaticojejunostomy.  2. The previously seen fluid collections in the upper abdomen appear  stable to slightly decreased in size, however there is increased  peripheral enhancement, again raising the question of abscess.  3. Diffuse inflammatory changes in the upper abdomen appear relatively  stable, with new mild narrowing of the upper SMV.    [Urgent Result: Increased pneumoperitoneum]    Finding was identified on 2/10/2020 10:55 AM.     Pia Oneil PA-C was contacted by Dr. Luna at 2/10/2020 11:52 AM  and verbalized understanding of the urgent finding.       I have personally reviewed the examination and initial interpretation  and I agree with the findings.    LOU JUNIOR, DO       Discharge Medications:   Nathalie Bashir   Home Medication Instructions DENIS:[id number]    Printed on:02/14/20 1208   Medication Information                      amitriptyline (ELAVIL) 25 MG tablet  Take 3 tablets by mouth At Bedtime             amoxicillin-clavulanate (AUGMENTIN) 875-125 MG  tablet  Take 1 tablet by mouth 2 times daily for 7 days             calcium carb 1250 mg, 500 mg [place],/vitamin D 200 units (OSCAL WITH D) 500-200 MG-UNIT per tablet  Take 1 tablet by mouth 3 times daily (with meals)             citalopram (CELEXA) 20 MG tablet  Take 20 mg by mouth every morning              diclofenac (FLECTOR) 1.3 % patch  Place 1 patch onto the skin daily as needed              enoxaparin ANTICOAGULANT (LOVENOX) 40 MG/0.4ML syringe  Inject 0.4 mLs (40 mg) Subcutaneous every 24 hours for 21 days             fluticasone (FLONASE) 50 MCG/ACT nasal spray  Spray 2 sprays into both nostrils daily as needed              hydrOXYzine (ATARAX) 10 MG tablet  Take 10 mg by mouth 3 times daily as needed for itching             methocarbamol (ROBAXIN) 500 MG tablet  Take 1 tablet (500 mg) by mouth 4 times daily             metoclopramide (REGLAN) 10 MG tablet  Take 1 tablet (10 mg) by mouth 4 times daily (before meals and nightly)             multivitamin, therapeutic with minerals (MULTI-VITAMIN) TABS  Take 1 tablet by mouth daily             oxyCODONE IR (ROXICODONE) 10 MG tablet  Take 10 mg by mouth 4 times daily             pantoprazole (PROTONIX) 40 MG EC tablet  Take 1 tablet (40 mg) by mouth every morning (before breakfast)             polyethylene glycol (MIRALAX/GLYCOLAX) packet  Take 17 g by mouth daily             senna-docusate (SENOKOT-S/PERICOLACE) 8.6-50 MG tablet  Take 1 tablet by mouth At Bedtime for 14 days             SUMAtriptan (IMITREX) 100 MG tablet  Take 100 mg by mouth as needed             tiZANidine (ZANAFLEX) 4 MG tablet  Take 1 tablet by mouth 2 times daily as needed                 Follow up:  Discharge Procedure Orders   CBC with platelets   Standing Status: Future Standing Exp. Date: 04/14/20   Order Comments: Last Lab Result: Hemoglobin (g/dL)       Date                     Value                 02/14/2020                                 Canceled, Test credited  ----------      Comprehensive metabolic panel   Standing Status: Future Standing Exp. Date: 04/14/20     Reason for your hospital stay   Order Comments: [place], re-admission for sepsis     Activity   Order Comments: Your activity upon discharge: activity as tolerated. Do not lift more than 10 lbs for 6 weeks after surgery.     Order Specific Question Answer Comments   Is discharge order? Yes      Reason for your hospital stay   Order Comments: Leukocytosis     Adult [facility] Follow-up and recommended labs and tests   Order Comments: Follow up with Dr. Mallory , at (location with clinic name or city) [facility], on Friday (as previously scheduled)  to evaluate medication change. The following labs/tests are recommended: CBC. BMP.    Appointments on [place] and/or [facility] (with [facility] or [facility] provider or service). Call [phone] if you haven't heard regarding these appointments within 7 days of discharge.     Activity   Order Comments: Your activity upon discharge: activity as tolerated     Order Specific Question Answer Comments   Is discharge order? Yes      Full Code     Order Specific Question Answer Comments   Code status determined by: Discussion with patient/legal decision maker      Diet   Order Comments: Follow this diet upon discharge: Orders Placed This Encounter      Calorie Counts      Snacks/Supplements Adult: Boost Plus; Between Meals      Regular Diet Adult     Order Specific Question Answer Comments   Is discharge order? Yes      Ti Mckeon, PGY7  Pager [phone]

## 2020-02-14 NOTE — PROGRESS NOTES
CLINICAL NUTRITION SERVICES - REASSESSMENT NOTE     Nutrition Prescription    RECOMMENDATIONS FOR MDs/PROVIDERS TO ORDER:  None at this time     Malnutrition Status:    Non-severe malnutrition in the context of acute illness    Recommendations already ordered by Registered Dietitian (RD):  None at this time     Future/Additional Recommendations:  Recommend patient be able to consistently consume at least 1100 kcal and 60g protein daily (~60% estimated kcal and protein needs) vs need for additional nutrition intervention (more supplements, scheduled snacks, appetite stimulant if appropriate, etc)     EVALUATION OF THE PROGRESS TOWARD GOALS   Diet: Regular + Boost Plus between meals + Calorie Counts 2/14-2/16    Nutrition Support: CPN, 1200 mL/day with goal 260 g dextrose, 100 g AA, and 250 mL 20% IV lipids daily to provide 1784 kcals (25 kcal/kg/day), 1.4 g PRO/kg/day, GIR 2.54 with 28% kcals from Fat.    PO Intake: NPO 2/5-2/11, clear liquids 2/11, full liquids 2/12, and advanced to regular diet yesterday 2/13.  Tolerated diet yesterday with good appetite.    PN Intake: CPN started 2/7 with initial dextrose 120 g, advanced to 50 g dex daily and reached goal dextrose on 2/10. I/O documentation shows most days either > or < goal ordered TPN and lipid volumes, but no documentation of any TPN/lipid interruptions over the past week per review of progress notes; suspect some I/O documentation error?     NEW FINDINGS   Weight: down 12 lbs (5 kg) since 2/4 (6% wt loss).  Suspect weight loss has been going on since Whipple procedure on 1/28 as pt reported overall low PO intake since that time (per RD note on 2/7).  Will update dosing weight to 69 kg (adjusted) and updated estimated nutrition needs below:    ASSESSED NUTRITION NEEDS  Estimated Energy Needs: 1725 - 2070 kcals/day (25 - 30 kcals/kg)  Justification: Maintenance  Estimated Protein Needs: 83 - 104 grams protein/day (1.2 - 1.5 grams of pro/kg)  Justification:  Preservation of lean body mass and Increased needs 2/2 acute illness  Estimated Fluid Needs: 1 mL/kcal  Justification: Maintenance or Per provider pending fluid status    Labs: K+, Mg++, Phos WNL.  BGs stable.  Alk Phos, ALT, AST WNL on 2/10, Tbili 0.1 (L) on 2/10.  TG WNL on 2/7.     MALNUTRITION  % Intake: Decreased intake does not meet criteria  % Weight Loss: > 2% in 1 week (severe)  Subcutaneous Fat Loss: Facial region:  Mild per RD note on 2/7  Muscle Loss: Facial & jaw region, Scapular bone:  Mild, Thoracic region (clavicle, acromium bone, deltoid, trapezius, pectoral), Upper arm (bicep, tricep), Lower arm  (forearm), Dorsal hand:  Moderate and Posterior calf:  Mild per RD note on 2/7  Fluid Accumulation/Edema: None noted per chart review  Malnutrition Diagnosis: Non-severe malnutrition in the context of acute illness    Previous Goals   Total avg nutritional intake to meet a minimum of 25 kcal/kg and 1.5 g PRO/kg daily (per dosing wt 71 kg).  Evaluation: Not met due to slow advancement to goal TPN 2/2 refeeding concerns    Previous Nutrition Diagnosis  Inadequate oral intake related to complicated post-op course and now with altered GI status (question of pneumatosis of the reconstruction small bowel limb) and NPO status inhibiting ability to meet nutrition needs PO as evidenced by presence of urinary ketones on admission, pt reports of minimal PO intake over past 10 days since Whipple, and need for TPN to meet nutrition needs at this time  Evaluation: Improving, updated    CURRENT NUTRITION DIAGNOSIS  Predicted inadequate nutrient intake (protein-energy) related to NPO 2/2 altered GI status requiring TPN the past week while NPO or on liquid diets, and potential for inadequate intake/poor appetite now that diet advanced and TPN being weaned    INTERVENTIONS  Implementation  Collaboration with other providers: discussed pt in interdisciplinary team rounds, per provider plan to stop TPN after today's bag runs  out, discussed with Pharmacist (TPN consult discontinued by provider)  Chart review, pt with other cares during attempts to visit today    Goals  Total avg nutritional intake to meet a minimum of 25 kcal/kg and 1.2 g PRO/kg daily (per dosing wt 69 kg).    Monitoring/Evaluation  Progress toward goals will be monitored and evaluated per protocol.     Sara Steward RD, LD  7C RD pager: [phone]

## 2020-02-14 NOTE — PLAN OF CARE
"/58 (BP Location: Left arm)   Pulse 102   Temp 97.6  F (36.4  C) (Oral)   Resp 16   Ht 1.727 m (5' 8\")   Wt 84.8 kg (186 lb 15.2 oz)   LMP 09/23/2014   SpO2 96%   BMI 28.43 kg/m      VSS on room air. Up independently. Pain noted in abdomen, oxycodone and IV dilaudid for pain management. Voiding adequately. Passing flatus, no BM this shift. Denies nausea. PICC infusing TPN @ 50 + lipids  and TKO between antibiotics. Resting between cares. Continue with POC.  "

## 2020-02-14 NOTE — PLAN OF CARE
"/58 (BP Location: Left arm)   Pulse 102   Temp 97.6  F (36.4  C) (Oral)   Resp 16   Ht 1.727 m (5' 8\")   Wt 84.8 kg (186 lb 15.2 oz)   LMP 09/23/2014   SpO2 96%   BMI 28.43 kg/m      VSS. Afebrile. RA. Pain controlled. A&O. Up ad nelida. Lungs clear. Adequate urine output. Passing gas. Appetite good. Midline incision intact. PICC infusion continuous TPN and IV antibiotics. Ambulating in room. Will continue with POC.   "

## 2020-02-14 NOTE — PROGRESS NOTES
Surgery  Progress Note  02/14/20    Interval Events:    No acute events overnight. Tolerating regular diet. No new abdominal pain. Still some mild RUQ pain. + BM    Objective:  Vitals reviewed; still mild tachycardia unchanged  I/O reviewed; good UOP  Appears comfortable  Non-labored breathing  Abdomen soft, non-tender; staples intact    Electrolytes pending    No new imaging    Assessment:  54 year old female s/p whipple now with some small fluid collections who presented to the hospital with leukocytosis.     Plan:     Pain control  Regular diet; TPN likely wean tonight  Strict I/O  Cefepime, flagyl - will need iv abx at discharge  PPI  Lovenox ppx    Ti Mckeon MD  General Surgery (PGY-7)  Pager [phone]

## 2020-02-14 NOTE — PLAN OF CARE
Abdomen incision dry/intact, staples taken out by MD this am.Positive gas and tolerating regular diet, voiding spont. TPN will be stopped at 1400, PICC line saline locked. C/O pain at right upper abdomen site, Oxycodone q6 hours, has one more IV Dilaudid dose ordered then should be on oral pain medications only. Up ad nelida, plan for discharge tomorrow on oral antibiotics.  1422- Last dose of IV Dilaudid given.

## 2020-02-15 VITALS
OXYGEN SATURATION: 96 % | WEIGHT: 186.4 LBS | TEMPERATURE: 97.7 F | HEART RATE: 102 BPM | RESPIRATION RATE: 18 BRPM | HEIGHT: 68 IN | BODY MASS INDEX: 28.25 KG/M2 | SYSTOLIC BLOOD PRESSURE: 102 MMHG | DIASTOLIC BLOOD PRESSURE: 59 MMHG

## 2020-02-15 LAB
ANION GAP SERPL CALCULATED.3IONS-SCNC: 6 MMOL/L (ref 3–14)
BUN SERPL-MCNC: 14 MG/DL (ref 7–30)
CALCIUM SERPL-MCNC: 9.2 MG/DL (ref 8.5–10.1)
CHLORIDE SERPL-SCNC: 103 MMOL/L (ref 94–109)
CO2 SERPL-SCNC: 26 MMOL/L (ref 20–32)
CREAT SERPL-MCNC: 0.77 MG/DL (ref 0.52–1.04)
ERYTHROCYTE [DISTWIDTH] IN BLOOD BY AUTOMATED COUNT: 15.3 % (ref 10–15)
GFR SERPL CREATININE-BSD FRML MDRD: 88 ML/MIN/{1.73_M2}
GLUCOSE SERPL-MCNC: 117 MG/DL (ref 70–99)
HCT VFR BLD AUTO: 37.5 % (ref 35–47)
HGB BLD-MCNC: 11.9 G/DL (ref 11.7–15.7)
LACTATE BLD-SCNC: 0.9 MMOL/L (ref 0.7–2)
MCH RBC QN AUTO: 28.1 PG (ref 26.5–33)
MCHC RBC AUTO-ENTMCNC: 31.7 G/DL (ref 31.5–36.5)
MCV RBC AUTO: 89 FL (ref 78–100)
PLATELET # BLD AUTO: 592 10E9/L (ref 150–450)
POTASSIUM SERPL-SCNC: 4.2 MMOL/L (ref 3.4–5.3)
RBC # BLD AUTO: 4.23 10E12/L (ref 3.8–5.2)
SODIUM SERPL-SCNC: 135 MMOL/L (ref 133–144)
WBC # BLD AUTO: 12.2 10E9/L (ref 4–11)

## 2020-02-15 PROCEDURE — 25000132 ZZH RX MED GY IP 250 OP 250 PS 637: Mod: GY | Performed by: STUDENT IN AN ORGANIZED HEALTH CARE EDUCATION/TRAINING PROGRAM

## 2020-02-15 PROCEDURE — 25000128 H RX IP 250 OP 636: Performed by: NEUROLOGICAL SURGERY

## 2020-02-15 PROCEDURE — 36415 COLL VENOUS BLD VENIPUNCTURE: CPT | Performed by: NEUROLOGICAL SURGERY

## 2020-02-15 PROCEDURE — 25000132 ZZH RX MED GY IP 250 OP 250 PS 637: Mod: GY | Performed by: SURGERY

## 2020-02-15 PROCEDURE — 36415 COLL VENOUS BLD VENIPUNCTURE: CPT | Performed by: SURGERY

## 2020-02-15 PROCEDURE — 25000132 ZZH RX MED GY IP 250 OP 250 PS 637: Mod: GY | Performed by: NEUROLOGICAL SURGERY

## 2020-02-15 PROCEDURE — 83605 ASSAY OF LACTIC ACID: CPT | Performed by: SURGERY

## 2020-02-15 PROCEDURE — 80048 BASIC METABOLIC PNL TOTAL CA: CPT | Performed by: NEUROLOGICAL SURGERY

## 2020-02-15 PROCEDURE — 85027 COMPLETE CBC AUTOMATED: CPT | Performed by: NEUROLOGICAL SURGERY

## 2020-02-15 RX ADMIN — PANTOPRAZOLE SODIUM 40 MG: 40 TABLET, DELAYED RELEASE ORAL at 08:08

## 2020-02-15 RX ADMIN — METOCLOPRAMIDE HYDROCHLORIDE 10 MG: 10 TABLET ORAL at 08:08

## 2020-02-15 RX ADMIN — Medication 0.4 MG: at 02:56

## 2020-02-15 RX ADMIN — METHOCARBAMOL 500 MG: 500 TABLET, FILM COATED ORAL at 08:08

## 2020-02-15 RX ADMIN — OXYCODONE HYDROCHLORIDE 15 MG: 5 TABLET ORAL at 00:14

## 2020-02-15 RX ADMIN — AMOXICILLIN AND CLAVULANATE POTASSIUM 1 TABLET: 875; 125 TABLET, FILM COATED ORAL at 08:08

## 2020-02-15 RX ADMIN — OXYCODONE HYDROCHLORIDE 15 MG: 5 TABLET ORAL at 06:08

## 2020-02-15 RX ADMIN — CITALOPRAM HYDROBROMIDE 20 MG: 20 TABLET ORAL at 08:08

## 2020-02-15 ASSESSMENT — ACTIVITIES OF DAILY LIVING (ADL)
ADLS_ACUITY_SCORE: 14

## 2020-02-15 NOTE — PROGRESS NOTES
Calorie Count  Intake recorded for: 2/14  Total Kcals: 796 Total Protein: 51g  Kcals from Hospital Food: 796  Protein: 51g  Kcals from Outside Food (average):0 Protein: 0g  # Meals Recorded: 3 meals (First - 100% coffee, 75% omelet w/ mushrooms & spinach, 50% milk, 25% cantaloupe)      (Second - 100% baked potato chips, deli turkey & tomato slices)      (Third - 100% cola, applesauce, 50% sweet & sour chicken stir kaur)  # Supplements Recorded: 0

## 2020-02-15 NOTE — PROGRESS NOTES
Surg onc note    Patient is in good spirits. A little nervous but ready to go home. Pain controlled. No nausea or vomiting. Having Bms.    AF,  stable, vs o/w wnl  UOP: 300/4.7    NAD laying in bed  Abd soft, non-distended, incision is covered with steristrips there is a small amount of drainage from the middle of the incision.  WWP    Labs: Pending. WBC of 13.9.    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 Convalescing well on antibiotics.   -Regular diet  -Transitioned to augmentin  -Lovenox ppx  -Discharge home today. Will remove PICC prior to discharge.    Kleber Lorenzana  PGY 7

## 2020-02-15 NOTE — PLAN OF CARE
"/59 (BP Location: Left arm)   Pulse 102   Temp 97.7  F (36.5  C) (Oral)   Resp 18   Ht 1.727 m (5' 8\")   Wt 84.6 kg (186 lb 6.4 oz)   LMP 09/23/2014   SpO2 96%   BMI 28.34 kg/m      VSS. Afebrile. RA. Up ad nelida. Lungs clear. Voiding adequate amounts. Passing gas. Midline incision intact. Staples removed. Steri strips in place. Appetite good.  Pain controlled. Plan to discharge home today. Will continue with POC.   "

## 2020-02-15 NOTE — PLAN OF CARE
Reviewed discharge instructions w/ pt and her SO. Answered their questions and provided clarifications. Removed PICC. WBCs stable for discharge per Dr. Lorenzana. Pt's SO picked up discharge med. Pt was brought down to lobby in wheelchair by transport. Pt's SO carried pt belongings and is providing pt w/ ride.

## 2020-02-16 ENCOUNTER — HEALTH MAINTENANCE LETTER (OUTPATIENT)
Age: 55
End: 2020-02-16

## 2020-02-16 ENCOUNTER — DOCUMENTATION ONLY (OUTPATIENT)
Dept: CARE COORDINATION | Facility: CLINIC | Age: 55
End: 2020-02-16

## 2020-02-18 ENCOUNTER — PATIENT OUTREACH (OUTPATIENT)
Dept: SURGERY | Facility: CLINIC | Age: 55
End: 2020-02-18

## 2020-02-18 DIAGNOSIS — D72.829 LEUKOCYTOSIS: Primary | ICD-10-CM

## 2020-02-18 NOTE — TELEPHONE ENCOUNTER
Post Op Discharge Call    Discharge Date:  2/16/20    Immediate Concerns: None at this time.     Pain: Controlled, no concerns regarding pain management.     Incision:  No concerns, healing well, no redness, drainage or edema reported.      Diet: Regular, tolerating well. Denies nausea, vomiting.     Bowels:  Passing gas, no issues with bowel movements. Taking stool softeners daily. Denies constipation and cramping.     Activity: No difficulty with ADLs reported. Patient up independently at home.     Post op/follow up plans: Post op appointment scheduled, confirmed date and time with patient.     Patient has our direct number for any questions or concerns that may arise.      Kenzie Ace, RN, BSN  Care Coordinator - Dr. Wills  [phone]

## 2020-02-21 ENCOUNTER — OFFICE VISIT (OUTPATIENT)
Dept: SURGERY | Facility: CLINIC | Age: 55
End: 2020-02-21
Attending: SURGERY
Payer: MEDICARE

## 2020-02-21 VITALS
DIASTOLIC BLOOD PRESSURE: 76 MMHG | BODY MASS INDEX: 28.13 KG/M2 | WEIGHT: 185.6 LBS | HEIGHT: 68 IN | TEMPERATURE: 98.3 F | HEART RATE: 109 BPM | SYSTOLIC BLOOD PRESSURE: 116 MMHG | OXYGEN SATURATION: 98 % | RESPIRATION RATE: 16 BRPM

## 2020-02-21 DIAGNOSIS — R18.8 INTRAABDOMINAL FLUID COLLECTION: ICD-10-CM

## 2020-02-21 DIAGNOSIS — D72.829 LEUKOCYTOSIS: ICD-10-CM

## 2020-02-21 DIAGNOSIS — C16.2 MALIGNANT NEOPLASM OF BODY OF STOMACH (H): Primary | ICD-10-CM

## 2020-02-21 LAB
ALBUMIN SERPL-MCNC: 2.8 G/DL (ref 3.4–5)
ALP SERPL-CCNC: 158 U/L (ref 40–150)
ALT SERPL W P-5'-P-CCNC: 20 U/L (ref 0–50)
ANION GAP SERPL CALCULATED.3IONS-SCNC: 5 MMOL/L (ref 3–14)
AST SERPL W P-5'-P-CCNC: 30 U/L (ref 0–45)
BASOPHILS # BLD AUTO: 0.1 10E9/L (ref 0–0.2)
BASOPHILS NFR BLD AUTO: 0.8 %
BILIRUB SERPL-MCNC: 0.3 MG/DL (ref 0.2–1.3)
BUN SERPL-MCNC: 7 MG/DL (ref 7–30)
CALCIUM SERPL-MCNC: 9.1 MG/DL (ref 8.5–10.1)
CHLORIDE SERPL-SCNC: 105 MMOL/L (ref 94–109)
CO2 SERPL-SCNC: 28 MMOL/L (ref 20–32)
CREAT SERPL-MCNC: 0.75 MG/DL (ref 0.52–1.04)
DIFFERENTIAL METHOD BLD: ABNORMAL
EOSINOPHIL # BLD AUTO: 0.2 10E9/L (ref 0–0.7)
EOSINOPHIL NFR BLD AUTO: 2.6 %
ERYTHROCYTE [DISTWIDTH] IN BLOOD BY AUTOMATED COUNT: 15.2 % (ref 10–15)
GFR SERPL CREATININE-BSD FRML MDRD: >90 ML/MIN/{1.73_M2}
GLUCOSE SERPL-MCNC: 91 MG/DL (ref 70–99)
HCT VFR BLD AUTO: 34.5 % (ref 35–47)
HGB BLD-MCNC: 10.8 G/DL (ref 11.7–15.7)
IMM GRANULOCYTES # BLD: 0 10E9/L (ref 0–0.4)
IMM GRANULOCYTES NFR BLD: 0.3 %
LYMPHOCYTES # BLD AUTO: 1.6 10E9/L (ref 0.8–5.3)
LYMPHOCYTES NFR BLD AUTO: 21.8 %
MCH RBC QN AUTO: 28 PG (ref 26.5–33)
MCHC RBC AUTO-ENTMCNC: 31.3 G/DL (ref 31.5–36.5)
MCV RBC AUTO: 89 FL (ref 78–100)
MONOCYTES # BLD AUTO: 0.5 10E9/L (ref 0–1.3)
MONOCYTES NFR BLD AUTO: 6.6 %
NEUTROPHILS # BLD AUTO: 4.9 10E9/L (ref 1.6–8.3)
NEUTROPHILS NFR BLD AUTO: 67.9 %
NRBC # BLD AUTO: 0 10*3/UL
NRBC BLD AUTO-RTO: 0 /100
PLATELET # BLD AUTO: 578 10E9/L (ref 150–450)
POTASSIUM SERPL-SCNC: 4 MMOL/L (ref 3.4–5.3)
PROT SERPL-MCNC: 8 G/DL (ref 6.8–8.8)
RBC # BLD AUTO: 3.86 10E12/L (ref 3.8–5.2)
SODIUM SERPL-SCNC: 138 MMOL/L (ref 133–144)
WBC # BLD AUTO: 7.2 10E9/L (ref 4–11)

## 2020-02-21 PROCEDURE — G0463 HOSPITAL OUTPT CLINIC VISIT: HCPCS | Mod: ZF

## 2020-02-21 PROCEDURE — 36415 COLL VENOUS BLD VENIPUNCTURE: CPT | Performed by: SURGERY

## 2020-02-21 PROCEDURE — 80053 COMPREHEN METABOLIC PANEL: CPT | Performed by: SURGERY

## 2020-02-21 PROCEDURE — 85025 COMPLETE CBC W/AUTO DIFF WBC: CPT | Performed by: SURGERY

## 2020-02-21 ASSESSMENT — MIFFLIN-ST. JEOR: SCORE: 1495.38

## 2020-02-21 ASSESSMENT — PAIN SCALES - GENERAL: PAINLEVEL: SEVERE PAIN (6)

## 2020-02-21 NOTE — NURSING NOTE
"Oncology Rooming Note    February 21, 2020 10:01 AM   Nathalie Bashir is a 54 year old female who presents for:    Chief Complaint   Patient presents with     Oncology Clinic Visit     RETURN VISIT; INTRADUCTAL PAPILLARY MUCINOUS NEOPLASM; VITALS TAKEN      Initial Vitals: /76   Pulse 109   Temp 98.3  F (36.8  C) (Oral)   Resp 16   Ht 1.727 m (5' 8\")   Wt 84.2 kg (185 lb 9.6 oz)   LMP 09/23/2014   SpO2 98%   BMI 28.22 kg/m   Estimated body mass index is 28.22 kg/m  as calculated from the following:    Height as of this encounter: 1.727 m (5' 8\").    Weight as of this encounter: 84.2 kg (185 lb 9.6 oz). Body surface area is 2.01 meters squared.  Severe Pain (6) Comment: Data Unavailable   Patient's last menstrual period was 09/23/2014.  Allergies reviewed: Yes  Medications reviewed: Yes    Medications: Medication refills not needed today.  Pharmacy name entered into [id number] [place] [address]    Clinical concerns: Patient is requesting paperwork stating what her surgery was, about the infection and specific notes to bring to her other doctor.      Partner Violence Screening Completed: No - Patient isn't alone today   Oncology Distress Screening Completed: Yes     Cathy [address]              "

## 2020-02-21 NOTE — PROGRESS NOTES
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  The permanent Whipple specimen came back with an incidental, T4, diffuse type signet ring gastric cancer with a microscopically positive margin.  She now presents to discuss management moving forward.     Pertinent Oncologic History: 54 F w/ history of atypical abdominal symptoms and GI complaints including nausea and intermittent diarrhea.  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  While on surveillance, her most recent EUS showed increase in the size of the cyst described as dilation of the main pancreatic duct in the head of the pancreas to 10.4 mm.  MRI 9 mm, and CT 8 mm.  No other suspicious features.  FNA and fluid analysis showed mucinous epithelium, a string sign, high fluid amylase, and normal fluid CEA.   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  The permanent Whipple specimen came back with an incidental, T4, diffuse type signet ring gastric cancer with a microscopically positive margin.  She now presents to discuss management moving forward.     Pertinent Exam: Abdomen soft, non-tender, and non-distended.  No jaundice or scleral icterus.  Her midline incision is healing well.     HISTORY OF PRESENT ILLNESS:  The patient is feeling relatively well and is eating well without nausea or vomiting.  Her abdominal pain is progressively decreasing.  She has one half day of antibiotics remaining.  No fevers, chills or sweats.  Energy level is much improved.       Pertinent Work-Up/Findings:     Labs from today reveal and normal WBC and no other concerning findings.     Assessment/Counseling/Plan:     Nathalie Bashir is a 54 year old female who is s/p Whipple 1/28/2020 for suspected main duct  IPMN.  Final pathology showed benign changes in the pancreas and also an incidental, T4, diffuse-type signet ring gastric cancer with a submucosal growth pattern.  The microscopic margin on the stomach was positive.  I discussed gastric cancer and it's natural history in detail.  The type of cancer she has is aggressive and carries high risk for recurrence and distant spread.  In addition, it tends to involve most, or all of the stomach.  In her case, she meets criteria for neoadjuvant/perioperative chemotherapy based on histology and T stage.  My plan for her is to refer her to gastroenterology for EGD and EUS to better document the extent of the disease in the stomach, and to document if there is any concern for disease involving the esophagus.  EUS will also potentially help with lymph node staging.  In addition, I will refer her to medical oncology for chemotherapy.  I recommended that she does not start chemotherapy for at least another 3 weeks.  Prior to chemotherapy, I would like her to get a full body PET-CT to complete her staging.  I would prefer to wait until 6 weeks from surgery so as to minimize false positive signal related to post-surgical inflammation from her recent Whipple.  We will coordinate all of the this to get accurate staging and avoid any delays in treatment.  If she is found to have distant disease on her initial staging, then she will not be a surgical candidate in the future.  However, if she has surgically potentially curable disease, she will have 3 months of chemotherapy followed by restaging.  If there were no disease progression, then we would discuss curative intent surgery at that time.  All questions were answered and the patient was in agreement with and understanding of the plan.     Total time spent with the patient was 30 minutes, of which more than half was counseling and coordination of care.

## 2020-02-21 NOTE — PATIENT INSTRUCTIONS
Surgical Oncology RN Care Coordination Note:     - Keep appointment with medical oncology.     - we will plan for a PET scan in 3 weeks from now, this will be done prior to starting chemotherapy     - Endoscopic Ultrasound to further assess the extent of disease in the stomach. Our team will contact you to arrange this.     Kenzie Ace RN, BSN  Care Coordinator   [phone]

## 2020-02-21 NOTE — LETTER
2/21/2020     RE: Nathalie Bashir  [address] [address]     Dear Colleague,    Thank you for referring your patient, Nathalie Bashir, to the [facility]. Please see a copy of my visit note below.    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  The permanent Whipple specimen came back with an incidental, T4, diffuse type signet ring gastric cancer with a microscopically positive margin.  She now presents to discuss management moving forward.     Pertinent Oncologic History: 54 F w/ history of atypical abdominal symptoms and GI complaints including nausea and intermittent diarrhea.  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  While on surveillance, her most recent EUS showed increase in the size of the cyst described as dilation of the main pancreatic duct in the head of the pancreas to 10.4 mm.  MRI 9 mm, and CT 8 mm.  No other suspicious features.  FNA and fluid analysis showed mucinous epithelium, a string sign, high fluid amylase, and normal fluid CEA.   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  The permanent Whipple specimen came back with an incidental, T4, diffuse type signet ring gastric cancer with a microscopically positive margin.  She now presents to discuss management moving forward.     Pertinent Exam: Abdomen soft, non-tender, and non-distended.  No jaundice or scleral icterus.  Her midline incision is healing well.     HISTORY OF PRESENT ILLNESS:  The patient is feeling relatively well and is eating well without nausea or vomiting.  Her abdominal pain is progressively decreasing.  She has one half day of antibiotics remaining.  No fevers, chills or sweats.  Energy level is much improved.        Pertinent Work-Up/Findings:     Labs from today reveal and normal WBC and no other concerning findings.     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  Final pathology showed benign changes in the pancreas and also an incidental, T4, diffuse-type signet ring gastric cancer with a submucosal growth pattern.  The microscopic margin on the stomach was positive.  I discussed gastric cancer and it's natural history in detail.  The type of cancer she has is aggressive and carries high risk for recurrence and distant spread.  In addition, it tends to involve most, or all of the stomach.  In her case, she meets criteria for neoadjuvant/perioperative chemotherapy based on histology and T stage.  My plan for her is to refer her to gastroenterology for EGD and EUS to better document the extent of the disease in the stomach, and to document if there is any concern for disease involving the esophagus.  EUS will also potentially help with lymph node staging.  In addition, I will refer her to medical oncology for chemotherapy.  I recommended that she does not start chemotherapy for at least another 3 weeks.  Prior to chemotherapy, I would like her to get a full body PET-CT to complete her staging.  I would prefer to wait until 6 weeks from surgery so as to minimize false positive signal related to post-surgical inflammation from her recent Whipple.  We will coordinate all of the this to get accurate staging and avoid any delays in treatment.  If she is found to have distant disease on her initial staging, then she will not be a surgical candidate in the future.  However, if she has surgically potentially curable disease, she will have 3 months of chemotherapy followed by restaging.  If there were no disease progression, then we would discuss curative intent surgery at that time.  All questions were answered and the patient was in agreement  with and understanding of the plan.     Total time spent with the patient was  30 minutes, of which more than half was counseling and coordination of care.    Again, thank you for allowing me to participate in the care of your patient.      Sincerely,    Yandel Mallory MD

## 2020-02-24 ENCOUNTER — TELEPHONE (OUTPATIENT)
Dept: GASTROENTEROLOGY | Facility: CLINIC | Age: 55
End: 2020-02-24

## 2020-02-25 NOTE — TELEPHONE ENCOUNTER
FUTURE VISIT INFORMATION      SURGERY INFORMATION:    Date: 3/3/20    Location: [facility]    Surgeon:  Bakari Plata MD    Anesthesia Type:  MAC    Procedure: ESOPHAGOGASTRODUODENOSCOPY, WITH ENDOSCOPIC US    RECORDS REQUESTED FROM:       Primary Care Provider: Marianne Gonzalez APRN, CNP- [facility]    Most recent EKG+ Tracin20

## 2020-02-25 NOTE — TELEPHONE ENCOUNTER
Patient Name: Nathalie Bashir   : 1965  MRN: [id number]       :  N/A    Kelsey Active    Additional Information regarding appointment:  _____________________________________________________      Patient scheduled for:  Upper EUS    Indication for procedure. Malignant neoplasm of body of stomach    Sedation Type: MAC    Procedure Provider:  Boni      Referring Provider. Yandel Mallory; Marianne Gonzalez NP (PCP)    Arrival time verified: Tues / 3.3.2020    Facility location verified:   [place] [address]  1st Floor, Rm [phone]    [x] N/A for this Payor ([place] [employer])    Pt meets medical necessity for outpatient procedure in hospital Endoscopy Unit: N/A      History & Physical: [x] Complete, Date 2020 Discharge Summary Harriett nunez Post-op Update 20 [facility]  __________________________________________________      Prep Type:   [x] NPO /p MN, No solid food /p 2200 the night before      Patient taking any blood thinners ? Yes  Anticoagulants or blood thinners:   Confirmed   ................................................  [x] Lovenox     LAST anticoagulant dose: Date/Time: Monday, 3/2/2020 AM   ................................................  [x] Advised to contact Provider re: Anticoag management pre/sylvia prep      Electronic implanted medical devices ? No      GI / Hepatology History: Yes: Malignant neoplasm of body of stomach (2020) Whipple      Heart disease ? No       Lung disease ? No      Sleep apnea ? No      Diabetic ? No      Kidney disease ? No      Instructions given:   [x] Reviewed     [x] Resent via cheerapp - This includes: Upper EUS  instructions, MAC Instructions, procedure date/time/location/provider.           Pre procedure teaching completed: [x] Yes - Reviewed      IV Sedation: [x] No questions regarding Sedation as ordered       : Transportation from procedure & responsible adult  to be with patient following procedure for a minimum of 24 hrs (MAC): [x]  - confirmed will have post-procedure companionship as required    _______________________________________________    Ellen Burger RN  [facility]

## 2020-02-27 ENCOUNTER — ONCOLOGY VISIT (OUTPATIENT)
Dept: ONCOLOGY | Facility: CLINIC | Age: 55
End: 2020-02-27
Attending: INTERNAL MEDICINE
Payer: MEDICARE

## 2020-02-27 ENCOUNTER — PRE VISIT (OUTPATIENT)
Dept: ONCOLOGY | Facility: CLINIC | Age: 55
End: 2020-02-27

## 2020-02-27 ENCOUNTER — PATIENT OUTREACH (OUTPATIENT)
Dept: ONCOLOGY | Facility: CLINIC | Age: 55
End: 2020-02-27

## 2020-02-27 VITALS
BODY MASS INDEX: 28.7 KG/M2 | DIASTOLIC BLOOD PRESSURE: 66 MMHG | HEART RATE: 106 BPM | SYSTOLIC BLOOD PRESSURE: 106 MMHG | WEIGHT: 189.4 LBS | TEMPERATURE: 98 F | HEIGHT: 68 IN | OXYGEN SATURATION: 97 % | RESPIRATION RATE: 15 BRPM

## 2020-02-27 DIAGNOSIS — C16.3 MALIGNANT NEOPLASM OF PYLORIC ANTRUM (H): Primary | ICD-10-CM

## 2020-02-27 DIAGNOSIS — D64.9 ANEMIA, UNSPECIFIED TYPE: ICD-10-CM

## 2020-02-27 LAB
BASOPHILS # BLD AUTO: 0 10E9/L (ref 0–0.2)
BASOPHILS NFR BLD AUTO: 0.4 %
DIFFERENTIAL METHOD BLD: ABNORMAL
EOSINOPHIL # BLD AUTO: 0.3 10E9/L (ref 0–0.7)
EOSINOPHIL NFR BLD AUTO: 3.5 %
ERYTHROCYTE [DISTWIDTH] IN BLOOD BY AUTOMATED COUNT: 15.2 % (ref 10–15)
FERRITIN SERPL-MCNC: 90 NG/ML (ref 8–252)
FOLATE SERPL-MCNC: 17 NG/ML
HCT VFR BLD AUTO: 30.6 % (ref 35–47)
HGB BLD-MCNC: 9.8 G/DL (ref 11.7–15.7)
IMM GRANULOCYTES # BLD: 0 10E9/L (ref 0–0.4)
IMM GRANULOCYTES NFR BLD: 0.2 %
LYMPHOCYTES # BLD AUTO: 2.1 10E9/L (ref 0.8–5.3)
LYMPHOCYTES NFR BLD AUTO: 26.2 %
MCH RBC QN AUTO: 27.9 PG (ref 26.5–33)
MCHC RBC AUTO-ENTMCNC: 32 G/DL (ref 31.5–36.5)
MCV RBC AUTO: 87 FL (ref 78–100)
MONOCYTES # BLD AUTO: 0.6 10E9/L (ref 0–1.3)
MONOCYTES NFR BLD AUTO: 7.3 %
NEUTROPHILS # BLD AUTO: 5 10E9/L (ref 1.6–8.3)
NEUTROPHILS NFR BLD AUTO: 62.4 %
NRBC # BLD AUTO: 0 10*3/UL
NRBC BLD AUTO-RTO: 0 /100
PLATELET # BLD AUTO: 350 10E9/L (ref 150–450)
RBC # BLD AUTO: 3.51 10E12/L (ref 3.8–5.2)
RETICS # AUTO: 95.8 10E9/L (ref 25–95)
RETICS/RBC NFR AUTO: 2.7 % (ref 0.5–2)
VIT B12 SERPL-MCNC: 727 PG/ML (ref 193–986)
WBC # BLD AUTO: 8 10E9/L (ref 4–11)

## 2020-02-27 PROCEDURE — 85045 AUTOMATED RETICULOCYTE COUNT: CPT | Performed by: INTERNAL MEDICINE

## 2020-02-27 PROCEDURE — 82728 ASSAY OF FERRITIN: CPT | Performed by: INTERNAL MEDICINE

## 2020-02-27 PROCEDURE — G0463 HOSPITAL OUTPT CLINIC VISIT: HCPCS | Mod: ZF

## 2020-02-27 PROCEDURE — 40000611 ZZHCL STATISTIC MORPHOLOGY W/INTERP HEMEPATH TC 85060: Performed by: INTERNAL MEDICINE

## 2020-02-27 PROCEDURE — 99205 OFFICE O/P NEW HI 60 MIN: CPT | Mod: ZP | Performed by: INTERNAL MEDICINE

## 2020-02-27 PROCEDURE — 82607 VITAMIN B-12: CPT | Performed by: INTERNAL MEDICINE

## 2020-02-27 PROCEDURE — 36415 COLL VENOUS BLD VENIPUNCTURE: CPT

## 2020-02-27 PROCEDURE — 82746 ASSAY OF FOLIC ACID SERUM: CPT | Performed by: INTERNAL MEDICINE

## 2020-02-27 PROCEDURE — 85025 COMPLETE CBC W/AUTO DIFF WBC: CPT | Performed by: INTERNAL MEDICINE

## 2020-02-27 ASSESSMENT — MIFFLIN-ST. JEOR: SCORE: 1512.48

## 2020-02-27 ASSESSMENT — PAIN SCALES - GENERAL: PAINLEVEL: MODERATE PAIN (4)

## 2020-02-27 NOTE — NURSING NOTE
"Oncology Rooming Note    February 27, 2020 3:23 PM   Nathalie Bashir is a 54 year old female who presents for:    Chief Complaint   Patient presents with     Oncology Clinic Visit     New; Gastric Ca     Initial Vitals: /66   Pulse 106   Temp 98  F (36.7  C) (Oral)   Resp 15   Ht 1.727 m (5' 7.99\")   Wt 85.9 kg (189 lb 6.4 oz)   LMP 09/23/2014   SpO2 97%   BMI 28.80 kg/m   Estimated body mass index is 28.8 kg/m  as calculated from the following:    Height as of this encounter: 1.727 m (5' 7.99\").    Weight as of this encounter: 85.9 kg (189 lb 6.4 oz). Body surface area is 2.03 meters squared.  Moderate Pain (4) Comment: LUQ   Patient's last menstrual period was 09/23/2014.  Allergies reviewed: Yes  Medications reviewed: Yes    Medications: Medication refills not needed today.  Pharmacy [facility] entered into [id number] [place] [facility] [address] [place], [place] [address] [facility]    Clinical concerns: No concerns         Love Rae CMA              "

## 2020-02-27 NOTE — PATIENT INSTRUCTIONS
Labs today    See me back 3/19/2020    Schedule port for 3/20/2020    Schedule FLOT chemo for the week of 3/23/2020    Refer to Genetic counselor

## 2020-02-27 NOTE — NURSING NOTE
Patient labs drawn in clinic via venipuncture. Patient tolerated well. See flow sheet for details.      Astrid Couch, CMA

## 2020-02-27 NOTE — PROGRESS NOTES
RN Care Coordination Note  Met with Nathalie and her family after clinic visit/consultation appt with Dr. Pollard  Pt  verbalizes understanding of Dr. Pollard's plan of care (see MD Instructions )  Introduced self and role of Melba Hernandez as pt's RN Care Coordinator at [facility].     Provided Melba's contact information, [facility] phone number .  Auth to Discuss PHI form completed by pt and original sent to [facility] admin staff to enter into chart and send to HIM for scanning.   PORT Teach :Reviewed [facility] Port Implantation literature with Power Port teaching book/model. Med list reviewed for blood thinning medications: pt will be done with q24 hr Lovenox by the time port placement is scheduled 3/20.  Nathalie voiced understanding and appreciation of above information and denies any further questions, and he/she understands that I will follow and provide coordination as needed.  Lily Perdomo, RN, BSN, OCN  Care Coordinator  [facility]

## 2020-02-27 NOTE — LETTER
2/27/2020       RE: Nathalie Bashir  [address] [facility] [address]     Dear Colleague,    Thank you for referring your patient, Nathalie Bashir, to the [facility]. Please see a copy of my visit note below.    Oncology initial visit:  Date on this visit: 2/27/2020    Nathalie Bashir  is referred by Dr.Sara Gonzalez for an oncology consultation. She requires evaluation for new diagnosis of Gastric cancer.    Primary Physician: Marianne Gonzalez     History Of Present Illness:  Ms. Bashir is a 54 year old female who comes in today for newly diagnosed gastric adenocarcinoma.  She tells me that she was being followed for a pancreatic duct dilatation and pancreatic cyst which was noted in November 2018.  Since May 2019 she started noticing some nausea and vomiting and occasional abdominal discomfort.    She had an ultrasound in August 2019 which was essentially unremarkable.  She had a repeat endoscopic ultrasound on 10/29/2019 which showed increase in size of the cyst as well as dilation of the main pancreatic duct.  FNA and fluid analysis showed mucinous epithelium.  She was referred to Dr. 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  Surprisingly there was no pancreatic ductal neoplasm seen but on the resected specimen stomach cancer was noted.  It was poorly differentiated adenocarcinoma with poorly cohesive/signet ring cell type with proximal gastric mucosal and serosal margins being positive.  There was lymphovascular and perineural invasion seen.  22 lymph nodes were sampled and all were benign.  It was a pT4aN0 lesion.  HER-2/christine FISH was not amplified.  Because of the surprising finding she was referred to medical oncology.  Prior to that she was seen by Dr. Mallory who recommended and arranged further restaging procedure with EUS and a PET scan but because of recent surgery, the timing of the PET scan was moved forward so that it is at least 6 weeks out  from the surgery so that it does not interfere with postsurgical inflammation.  She comes in today accompanied by her  and uncle.    Overall she is doing well.  She currently denies any significant abdominal pain apart from mild discomfort.  She does have chronic neck and back pain and she follows with pain clinic and she is taking oxycodone for that.  Currently she denies any nausea and vomiting.  Bowels are working okay.  She denies any infections.  No shortness of breath.  Denies any new swellings or neuropathy.  Energy is improving.  She remains decently functional.  Prior to the surgery she was fully functional.  She is now eating better.  Prior to the surgery she denied any weight loss although she has lost some weight after surgery.    ECOG 0-1    ROS:  A comprehensive ROS was otherwise neg      Past Medical/Surgical History:  Past Medical History:   Diagnosis Date     Allergic rhinitis      Depression      Lumbago      Migraine      Other chronic pain     low back     Sacroiliitis (H)     low back pain     Trochanteric bursitis     leg length discrrepancy, hip pain     She has history of left leg trauma when she was 4 years of age and since then she has had issues with the left leg length and developed arthritis in the left hip requiring left hip replacement in 2016.  She also had left sacroiliac joint fusion several years ago.      Past Surgical History:   Procedure Laterality Date     ARTHROPLASTY HIP Left 2016     BACK SURGERY      L4-5 decompression     C REPAIR DETACH RETINA,SCLERAL BUCKLE       CATARACT IOL, RT/LT       CHOLECYSTECTOMY N/A 1/28/2020    Procedure: Cholecystectomy;  Surgeon: Yandel Mallory MD;  Location: [facility] OR     EYE SURGERY       OPEN REDUCTION INTERNAL FIXATION RODDING INTRAMEDULLARY FEMUR Left 12/23/2014    Procedure: OPEN REDUCTION INTERNAL FIXATION RODDING INTRAMEDULLARY FEMUR;  Surgeon: Arnol Santiago MD;  Location: [facility]     ORTHOPEDIC SURGERY       PICC DOUBLE LUMEN  PLACEMENT Right 02/07/2020    5 fr double lumen 41 cm     REMOVE HARDWARE LOWER EXTREMITY Left 4/7/2015    Procedure: REMOVE HARDWARE LOWER EXTREMITY;  Surgeon: Arnol Santiago MD;  [facility]     REMOVE HARDWARE RODDING INTRAMEDULLARY FEMUR Left 12/17/2015    Procedure: REMOVE HARDWARE RODDING INTRAMEDULLARY FEMUR;  Surgeon: Arnol Santiago MD;  [facility]     RESECT BONE LOWER EXTREMITY Left 12/23/2014    Procedure: RESECT BONE LOWER EXTREMITY;  Surgeon: Arnol Santiago MD;  [facility]     WHIPPLE PROCEDURE N/A 1/28/2020    Procedure: Open Whipple procedure and Cholecystectomy;  Surgeon: Yandel Mallory MD;  Location: [facility] OR     Cancer History:   As above    Allergies:  Allergies as of 02/27/2020 - Reviewed 02/27/2020   Allergen Reaction Noted     Gluten meal Palpitations 01/17/2020     Pregabalin  12/15/2015     Augmentin Rash 02/27/2020     Acetaminophen Nausea and Other (See Comments) 07/13/2017     Dust mite extract  12/15/2015     Gabapentin GI Disturbance 12/15/2015     Methadone Fatigue 12/15/2015     Mold  12/15/2015     Naprosyn [naproxen] GI Disturbance 12/15/2015     Oxycontin [oxycodone]  01/17/2020     Oxymetholone  01/17/2020     Seasonal allergies  12/15/2015     Topiramate Other (See Comments) 04/04/2016     Latex Rash 12/19/2014     Current Medications:  Current Outpatient Medications   Medication Sig Dispense Refill     amitriptyline (ELAVIL) 25 MG tablet Take 3 tablets by mouth At Bedtime       calcium carb 1250 mg, 500 mg [place],/vitamin D 200 units (OSCAL WITH D) 500-200 MG-UNIT per tablet Take 1 tablet by mouth 3 times daily (with meals)       citalopram (CELEXA) 20 MG tablet Take 20 mg by mouth every morning        diclofenac (FLECTOR) 1.3 % patch Place 1 patch onto the skin daily as needed        fluticasone (FLONASE) 50 MCG/ACT nasal spray Spray 2 sprays into both nostrils daily as needed        hydrOXYzine (ATARAX) 10 MG tablet Take 10 mg by mouth 3 times daily as  needed for itching       methocarbamol (ROBAXIN) 500 MG tablet Take 1 tablet (500 mg) by mouth 4 times daily 40 tablet 0     metoclopramide (REGLAN) 10 MG tablet Take 1 tablet (10 mg) by mouth 4 times daily (before meals and nightly) 120 tablet 0     multivitamin, therapeutic with minerals (MULTI-VITAMIN) TABS Take 1 tablet by mouth daily       oxyCODONE IR (ROXICODONE) 10 MG tablet Take 10 mg by mouth 4 times daily       pantoprazole (PROTONIX) 40 MG EC tablet Take 1 tablet (40 mg) by mouth every morning (before breakfast) 90 tablet 0     polyethylene glycol (MIRALAX/GLYCOLAX) packet Take 17 g by mouth daily 30 packet 0     SUMAtriptan (IMITREX) 100 MG tablet Take 100 mg by mouth as needed       tiZANidine (ZANAFLEX) 4 MG tablet Take 1 tablet by mouth 2 times daily as needed        Family History:  Family History   Problem Relation Age of Onset     Heart Failure Mother 77     Anesthesia Reaction No family hx of      Deep Vein Thrombosis No family hx of    Paternal grandmother had uterine cancer at 97.  Patient has 1 son who is healthy.      Social History:  Social History     Socioeconomic History     Marital status:      Spouse name: Not on file     Number of children: Not on file     Years of education: Not on file     Highest education level: Not on file   Occupational History     Not on file   Social Needs     Financial resource strain: Not on file     Food insecurity:     Worry: Not on file     Inability: Not on file     Transportation needs:     Medical: Not on file     Non-medical: Not on file   Tobacco Use     Smoking status: Former Smoker     Packs/day: 0.50     Years: 25.00     Pack years: 12.50     Types: Cigarettes     Last attempt to quit: 2020     Years since quittin.1     Smokeless tobacco: Never Used     Tobacco comment: smokes 5-7 cigarettes/day   Substance and Sexual Activity     Alcohol use: No     Drug use: No     Sexual activity: Not on file   Lifestyle     Physical activity:      "Days per week: Not on file     Minutes per session: Not on file     Stress: Not on file   Relationships     Social connections:     Talks on phone: Not on file     Gets together: Not on file     Attends Church service: Not on file     Active member of club or organization: Not on file     Attends meetings of clubs or organizations: Not on file     Relationship status: Not on file     Intimate partner violence:     Fear of current or ex partner: Not on file     Emotionally abused: Not on file     Physically abused: Not on file     Forced sexual activity: Not on file   Other Topics Concern     Not on file   Social History Narrative     Not on file     She used to smoke but quit on the date of her surgery.  Denies alcohol use.  Lives with her , son and 3 grandkids.    Physical Exam:  /66   Pulse 106   Temp 98  F (36.7  C) (Oral)   Resp 15   Ht 1.727 m (5' 7.99\")   Wt 85.9 kg (189 lb 6.4 oz)   LMP 09/23/2014   SpO2 97%   BMI 28.80 kg/m        Wt Readings from Last 4 Encounters:   02/27/20 85.9 kg (189 lb 6.4 oz)   02/21/20 84.2 kg (185 lb 9.6 oz)   02/14/20 84.6 kg (186 lb 6.4 oz)   02/03/20 90.1 kg (198 lb 11.2 oz)     CONSTITUTIONAL: no acute distress  EYES: PERRLA, no palor or icterus.   ENT/MOUTH: no mouth lesions. Ears normal  CVS: s1s2 no m r g .   RESPIRATORY: clear to auscultation b/l  GI: Surgical scars are healing nicely.  She has mild tenderness but no evidence of infection.  Abdomen is soft.  No organomegaly appreciated.    NEURO: AAOX3  Grossly non focal neuro exam  INTEGUMENT: no obvious rashes  LYMPHATIC: no palpable cervical, supraclavicular, axillary or inguinal LAD  MUSCULOSKELETAL: Unremarkable. No bony tenderness.   EXTREMITIES: no edema  PSYCH: Mentation, mood and affect are normal. Decision making capacity is intact    Laboratory/Imaging Studies      Reviewed      ASSESSMENT/PLAN:    Incidentally found poorly differentiated signet ring type gastric adenocarcinoma, HER-2 " negative.  This was found on Whipple surgery specimen as it was done for presumed pancreatic IPMN although no pancreatic neoplasm was found.  It was a pT4aN0 lesion with lymphovascular invasion and perineural invasion seen.  All 22 lymph nodes were negative.  We discussed the situation in detail.  Her gastric cancer has not been treated adequately at this time and we need to work this up further.  I would agree with doing the endoscopic ultrasound as a scheduled for next week.  She will have a PET/CT on 3/13/2020.  Depending on what it shows, we will start treating her.  We discussed that generally stomach cancer is treated with neoadjuvant chemotherapy followed by surgery followed by adjuvant chemotherapy but sometimes all of the chemotherapy is done upfront because a significant number of patients are unable to get adjuvant chemotherapy after the surgery.  We will need to decide this later on.  If there is any evidence of metastatic spread then the treatments would be palliative and not curative and then surgery would not be performed.  We did not go into details of chemotherapy today as it will be decided after proper restaging has been done.  Most likely I would choose FLOT chemotherapy in the neoadjuvant setting.  If her disease seem to be incurable then I would choose FOLFOX chemotherapy.  As mentioned above we did not go into details of the chemotherapy today.  We discussed that chemotherapy would be given through a port and I believe it would be reasonable to plan to placing a port after I see her back.  As her is scan is on 3/13/2020, I would like to see her back on 3/19/2020 and then we can schedule port placement for the following day.  We will try to reserve an infusion room spot for her for the following week to start chemotherapy.        Discussion regarding genetic testing.  I would like her to meet with a genetic counselor because of her personal history of gastric cancer.    Anemia.  Likely because  of underlying cancer as well as recent surgery but we would check peripheral blood smear, iron studies and B12 and folate levels.    Chronic pain.  She will follow with pain clinic as before.    I will see her back on 3/19/2020.    All their questions were answered to their satisfaction.  They are agreeable and comfortable with the plan.    Magaly Pollard MD

## 2020-02-27 NOTE — PROGRESS NOTES
Oncology initial visit:  Date on this visit: 2/27/2020    Nathalie Bashir  is referred by Dr.Sara Gonzalez for an oncology consultation. She requires evaluation for new diagnosis of Gastric cancer.    Primary Physician: Marianne Gonzalez     History Of Present Illness:  Ms. Bashir is a 54 year old female who comes in today for newly diagnosed gastric adenocarcinoma.  She tells me that she was being followed for a pancreatic duct dilatation and pancreatic cyst which was noted in November 2018.  Since May 2019 she started noticing some nausea and vomiting and occasional abdominal discomfort.    She had an ultrasound in August 2019 which was essentially unremarkable.  She had a repeat endoscopic ultrasound on 10/29/2019 which showed increase in size of the cyst as well as dilation of the main pancreatic duct.  FNA and fluid analysis showed mucinous epithelium.  She was referred to Dr. 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  Surprisingly there was no pancreatic ductal neoplasm seen but on the resected specimen stomach cancer was noted.  It was poorly differentiated adenocarcinoma with poorly cohesive/signet ring cell type with proximal gastric mucosal and serosal margins being positive.  There was lymphovascular and perineural invasion seen.  22 lymph nodes were sampled and all were benign.  It was a pT4aN0 lesion.  HER-2/christine FISH was not amplified.  Because of the surprising finding she was referred to medical oncology.  Prior to that she was seen by Dr. Mallory who recommended and arranged further restaging procedure with EUS and a PET scan but because of recent surgery, the timing of the PET scan was moved forward so that it is at least 6 weeks out from the surgery so that it does not interfere with postsurgical inflammation.  She comes in today accompanied by her  and uncle.    Overall she is doing well.  She currently denies any significant abdominal pain apart from mild  discomfort.  She does have chronic neck and back pain and she follows with pain clinic and she is taking oxycodone for that.  Currently she denies any nausea and vomiting.  Bowels are working okay.  She denies any infections.  No shortness of breath.  Denies any new swellings or neuropathy.  Energy is improving.  She remains decently functional.  Prior to the surgery she was fully functional.  She is now eating better.  Prior to the surgery she denied any weight loss although she has lost some weight after surgery.    ECOG 0-1    ROS:  A comprehensive ROS was otherwise neg      Past Medical/Surgical History:  Past Medical History:   Diagnosis Date     Allergic rhinitis      Depression      Lumbago      Migraine      Other chronic pain     low back     Sacroiliitis (H)     low back pain     Trochanteric bursitis     leg length discrrepancy, hip pain     She has history of left leg trauma when she was 4 years of age and since then she has had issues with the left leg length and developed arthritis in the left hip requiring left hip replacement in 2016.  She also had left sacroiliac joint fusion several years ago.      Past Surgical History:   Procedure Laterality Date     ARTHROPLASTY HIP Left 2016     BACK SURGERY      L4-5 decompression     C REPAIR DETACH RETINA,SCLERAL BUCKLE       CATARACT IOL, RT/LT       CHOLECYSTECTOMY N/A 1/28/2020    Procedure: Cholecystectomy;  Surgeon: Yandel Mallory MD;  Location: [facility] OR     EYE SURGERY       OPEN REDUCTION INTERNAL FIXATION RODDING INTRAMEDULLARY FEMUR Left 12/23/2014    Procedure: OPEN REDUCTION INTERNAL FIXATION RODDING INTRAMEDULLARY FEMUR;  Surgeon: Arnol Santiago MD;  [facility]     ORTHOPEDIC SURGERY       PICC DOUBLE LUMEN PLACEMENT Right 02/07/2020    5 fr double lumen 41 cm     REMOVE HARDWARE LOWER EXTREMITY Left 4/7/2015    Procedure: REMOVE HARDWARE LOWER EXTREMITY;  Surgeon: Arnol Santiago MD;  [facility]     REMOVE HARDWARE RODDING  INTRAMEDULLARY FEMUR Left 12/17/2015    Procedure: REMOVE HARDWARE RODDING INTRAMEDULLARY FEMUR;  Surgeon: Arnol Santiago MD;  [facility]     RESECT BONE LOWER EXTREMITY Left 12/23/2014    Procedure: RESECT BONE LOWER EXTREMITY;  Surgeon: Arnol Santiago MD;  [facility]     WHIPPLE PROCEDURE N/A 1/28/2020    Procedure: Open Whipple procedure and Cholecystectomy;  Surgeon: Yandel Mallory MD;  Location: [facility]     Cancer History:   As above    Allergies:  Allergies as of 02/27/2020 - Reviewed 02/27/2020   Allergen Reaction Noted     Gluten meal Palpitations 01/17/2020     Pregabalin  12/15/2015     Augmentin Rash 02/27/2020     Acetaminophen Nausea and Other (See Comments) 07/13/2017     Dust mite extract  12/15/2015     Gabapentin GI Disturbance 12/15/2015     Methadone Fatigue 12/15/2015     Mold  12/15/2015     Naprosyn [naproxen] GI Disturbance 12/15/2015     Oxycontin [oxycodone]  01/17/2020     Oxymetholone  01/17/2020     Seasonal allergies  12/15/2015     Topiramate Other (See Comments) 04/04/2016     Latex Rash 12/19/2014     Current Medications:  Current Outpatient Medications   Medication Sig Dispense Refill     amitriptyline (ELAVIL) 25 MG tablet Take 3 tablets by mouth At Bedtime       calcium carb 1250 mg, 500 mg [place],/vitamin D 200 units (OSCAL WITH D) 500-200 MG-UNIT per tablet Take 1 tablet by mouth 3 times daily (with meals)       citalopram (CELEXA) 20 MG tablet Take 20 mg by mouth every morning        diclofenac (FLECTOR) 1.3 % patch Place 1 patch onto the skin daily as needed        fluticasone (FLONASE) 50 MCG/ACT nasal spray Spray 2 sprays into both nostrils daily as needed        hydrOXYzine (ATARAX) 10 MG tablet Take 10 mg by mouth 3 times daily as needed for itching       methocarbamol (ROBAXIN) 500 MG tablet Take 1 tablet (500 mg) by mouth 4 times daily 40 tablet 0     metoclopramide (REGLAN) 10 MG tablet Take 1 tablet (10 mg) by mouth 4 times daily (before meals and nightly)  120 tablet 0     multivitamin, therapeutic with minerals (MULTI-VITAMIN) TABS Take 1 tablet by mouth daily       oxyCODONE IR (ROXICODONE) 10 MG tablet Take 10 mg by mouth 4 times daily       pantoprazole (PROTONIX) 40 MG EC tablet Take 1 tablet (40 mg) by mouth every morning (before breakfast) 90 tablet 0     polyethylene glycol (MIRALAX/GLYCOLAX) packet Take 17 g by mouth daily 30 packet 0     SUMAtriptan (IMITREX) 100 MG tablet Take 100 mg by mouth as needed       tiZANidine (ZANAFLEX) 4 MG tablet Take 1 tablet by mouth 2 times daily as needed        Family History:  Family History   Problem Relation Age of Onset     Heart Failure Mother 77     Anesthesia Reaction No family hx of      Deep Vein Thrombosis No family hx of    Paternal grandmother had uterine cancer at 97.  Patient has 1 son who is healthy.      Social History:  Social History     Socioeconomic History     Marital status:      Spouse name: Not on file     Number of children: Not on file     Years of education: Not on file     Highest education level: Not on file   Occupational History     Not on file   Social Needs     Financial resource strain: Not on file     Food insecurity:     Worry: Not on file     Inability: Not on file     Transportation needs:     Medical: Not on file     Non-medical: Not on file   Tobacco Use     Smoking status: Former Smoker     Packs/day: 0.50     Years: 25.00     Pack years: 12.50     Types: Cigarettes     Last attempt to quit: 2020     Years since quittin.1     Smokeless tobacco: Never Used     Tobacco comment: smokes 5-7 cigarettes/day   Substance and Sexual Activity     Alcohol use: No     Drug use: No     Sexual activity: Not on file   Lifestyle     Physical activity:     Days per week: Not on file     Minutes per session: Not on file     Stress: Not on file   Relationships     Social connections:     Talks on phone: Not on file     Gets together: Not on file     Attends Yazidi service: Not on file  "    Active member of club or organization: Not on file     Attends meetings of clubs or organizations: Not on file     Relationship status: Not on file     Intimate partner violence:     Fear of current or ex partner: Not on file     Emotionally abused: Not on file     Physically abused: Not on file     Forced sexual activity: Not on file   Other Topics Concern     Not on file   Social History Narrative     Not on file     She used to smoke but quit on the date of her surgery.  Denies alcohol use.  Lives with her , son and 3 grandkids.    Physical Exam:  /66   Pulse 106   Temp 98  F (36.7  C) (Oral)   Resp 15   Ht 1.727 m (5' 7.99\")   Wt 85.9 kg (189 lb 6.4 oz)   LMP 09/23/2014   SpO2 97%   BMI 28.80 kg/m       Wt Readings from Last 4 Encounters:   02/27/20 85.9 kg (189 lb 6.4 oz)   02/21/20 84.2 kg (185 lb 9.6 oz)   02/14/20 84.6 kg (186 lb 6.4 oz)   02/03/20 90.1 kg (198 lb 11.2 oz)     CONSTITUTIONAL: no acute distress  EYES: PERRLA, no palor or icterus.   ENT/MOUTH: no mouth lesions. Ears normal  CVS: s1s2 no m r g .   RESPIRATORY: clear to auscultation b/l  GI: Surgical scars are healing nicely.  She has mild tenderness but no evidence of infection.  Abdomen is soft.  No organomegaly appreciated.    NEURO: AAOX3  Grossly non focal neuro exam  INTEGUMENT: no obvious rashes  LYMPHATIC: no palpable cervical, supraclavicular, axillary or inguinal LAD  MUSCULOSKELETAL: Unremarkable. No bony tenderness.   EXTREMITIES: no edema  PSYCH: Mentation, mood and affect are normal. Decision making capacity is intact    Laboratory/Imaging Studies      Reviewed      ASSESSMENT/PLAN:    Incidentally found poorly differentiated signet ring type gastric adenocarcinoma, HER-2 negative.  This was found on Whipple surgery specimen as it was done for presumed pancreatic IPMN although no pancreatic neoplasm was found.  It was a pT4aN0 lesion with lymphovascular invasion and perineural invasion seen.  All 22 lymph " nodes were negative.  We discussed the situation in detail.  Her gastric cancer has not been treated adequately at this time and we need to work this up further.  I would agree with doing the endoscopic ultrasound as a scheduled for next week.  She will have a PET/CT on 3/13/2020.  Depending on what it shows, we will start treating her.  We discussed that generally stomach cancer is treated with neoadjuvant chemotherapy followed by surgery followed by adjuvant chemotherapy but sometimes all of the chemotherapy is done upfront because a significant number of patients are unable to get adjuvant chemotherapy after the surgery.  We will need to decide this later on.  If there is any evidence of metastatic spread then the treatments would be palliative and not curative and then surgery would not be performed.  We did not go into details of chemotherapy today as it will be decided after proper restaging has been done.  Most likely I would choose FLOT chemotherapy in the neoadjuvant setting.  If her disease seem to be incurable then I would choose FOLFOX chemotherapy.  As mentioned above we did not go into details of the chemotherapy today.  We discussed that chemotherapy would be given through a port and I believe it would be reasonable to plan to placing a port after I see her back.  As her is scan is on 3/13/2020, I would like to see her back on 3/19/2020 and then we can schedule port placement for the following day.  We will try to reserve an infusion room spot for her for the following week to start chemotherapy.        Discussion regarding genetic testing.  I would like her to meet with a genetic counselor because of her personal history of gastric cancer.    Anemia.  Likely because of underlying cancer as well as recent surgery but we would check peripheral blood smear, iron studies and B12 and folate levels.    Chronic pain.  She will follow with pain clinic as before.    I will see her back on 3/19/2020.    All  their questions were answered to their satisfaction.  They are agreeable and comfortable with the plan.    Magaly Pollard MD

## 2020-02-28 ENCOUNTER — PRE VISIT (OUTPATIENT)
Dept: SURGERY | Facility: CLINIC | Age: 55
End: 2020-02-28

## 2020-02-28 LAB — COPATH REPORT: NORMAL

## 2020-03-03 ENCOUNTER — ANESTHESIA EVENT (OUTPATIENT)
Dept: GASTROENTEROLOGY | Facility: CLINIC | Age: 55
End: 2020-03-03
Payer: MEDICARE

## 2020-03-03 ENCOUNTER — HOSPITAL ENCOUNTER (OUTPATIENT)
Facility: CLINIC | Age: 55
Discharge: HOME | End: 2020-03-03
Attending: INTERNAL MEDICINE | Admitting: INTERNAL MEDICINE
Payer: MEDICARE

## 2020-03-03 ENCOUNTER — ANESTHESIA (OUTPATIENT)
Dept: GASTROENTEROLOGY | Facility: CLINIC | Age: 55
End: 2020-03-03
Payer: MEDICARE

## 2020-03-03 VITALS
DIASTOLIC BLOOD PRESSURE: 68 MMHG | BODY MASS INDEX: 27.91 KG/M2 | OXYGEN SATURATION: 98 % | RESPIRATION RATE: 26 BRPM | SYSTOLIC BLOOD PRESSURE: 109 MMHG | TEMPERATURE: 98.4 F | WEIGHT: 183.5 LBS | HEART RATE: 96 BPM

## 2020-03-03 LAB — UPPER EUS: NORMAL

## 2020-03-03 PROCEDURE — 00000155 ZZHCL STATISTIC H-CELL BLOCK W/STAIN: Performed by: INTERNAL MEDICINE

## 2020-03-03 PROCEDURE — 88173 CYTOPATH EVAL FNA REPORT: CPT | Performed by: INTERNAL MEDICINE

## 2020-03-03 PROCEDURE — 88305 TISSUE EXAM BY PATHOLOGIST: CPT | Performed by: INTERNAL MEDICINE

## 2020-03-03 PROCEDURE — 37000008 ZZH ANESTHESIA TECHNICAL FEE, 1ST 30 MIN: Performed by: INTERNAL MEDICINE

## 2020-03-03 PROCEDURE — 25800030 ZZH RX IP 258 OP 636: Performed by: DENTIST

## 2020-03-03 PROCEDURE — 43242 EGD US FINE NEEDLE BX/ASPIR: CPT | Performed by: INTERNAL MEDICINE

## 2020-03-03 PROCEDURE — 88172 CYTP DX EVAL FNA 1ST EA SITE: CPT | Performed by: INTERNAL MEDICINE

## 2020-03-03 PROCEDURE — 25000128 H RX IP 250 OP 636: Performed by: DENTIST

## 2020-03-03 PROCEDURE — 88342 IMHCHEM/IMCYTCHM 1ST ANTB: CPT | Performed by: INTERNAL MEDICINE

## 2020-03-03 PROCEDURE — 37000009 ZZH ANESTHESIA TECHNICAL FEE, EACH ADDTL 15 MIN: Performed by: INTERNAL MEDICINE

## 2020-03-03 RX ORDER — FENTANYL CITRATE 50 UG/ML
INJECTION, SOLUTION INTRAMUSCULAR; INTRAVENOUS PRN
Status: DISCONTINUED | OUTPATIENT
Start: 2020-03-03 | End: 2020-03-03

## 2020-03-03 RX ORDER — FLUMAZENIL 0.1 MG/ML
0.2 INJECTION, SOLUTION INTRAVENOUS
Status: DISCONTINUED | OUTPATIENT
Start: 2020-03-03 | End: 2020-03-03 | Stop reason: HOSPADM

## 2020-03-03 RX ORDER — ONDANSETRON 2 MG/ML
INJECTION INTRAMUSCULAR; INTRAVENOUS PRN
Status: DISCONTINUED | OUTPATIENT
Start: 2020-03-03 | End: 2020-03-03

## 2020-03-03 RX ORDER — LIDOCAINE 40 MG/G
CREAM TOPICAL
Status: DISCONTINUED | OUTPATIENT
Start: 2020-03-03 | End: 2020-03-03 | Stop reason: HOSPADM

## 2020-03-03 RX ORDER — PROPOFOL 10 MG/ML
INJECTION, EMULSION INTRAVENOUS CONTINUOUS PRN
Status: DISCONTINUED | OUTPATIENT
Start: 2020-03-03 | End: 2020-03-03

## 2020-03-03 RX ORDER — SODIUM CHLORIDE, SODIUM LACTATE, POTASSIUM CHLORIDE, CALCIUM CHLORIDE 600; 310; 30; 20 MG/100ML; MG/100ML; MG/100ML; MG/100ML
INJECTION, SOLUTION INTRAVENOUS CONTINUOUS PRN
Status: DISCONTINUED | OUTPATIENT
Start: 2020-03-03 | End: 2020-03-03

## 2020-03-03 RX ORDER — NALOXONE HYDROCHLORIDE 0.4 MG/ML
.1-.4 INJECTION, SOLUTION INTRAMUSCULAR; INTRAVENOUS; SUBCUTANEOUS
Status: DISCONTINUED | OUTPATIENT
Start: 2020-03-03 | End: 2020-03-03 | Stop reason: HOSPADM

## 2020-03-03 RX ORDER — PROPOFOL 10 MG/ML
INJECTION, EMULSION INTRAVENOUS PRN
Status: DISCONTINUED | OUTPATIENT
Start: 2020-03-03 | End: 2020-03-03

## 2020-03-03 RX ADMIN — PROPOFOL 50 MG: 10 INJECTION, EMULSION INTRAVENOUS at 10:11

## 2020-03-03 RX ADMIN — FENTANYL CITRATE 100 MCG: 50 INJECTION, SOLUTION INTRAMUSCULAR; INTRAVENOUS at 09:45

## 2020-03-03 RX ADMIN — SODIUM CHLORIDE, POTASSIUM CHLORIDE, SODIUM LACTATE AND CALCIUM CHLORIDE: 600; 310; 30; 20 INJECTION, SOLUTION INTRAVENOUS at 09:45

## 2020-03-03 RX ADMIN — PROPOFOL 50 MG: 10 INJECTION, EMULSION INTRAVENOUS at 09:55

## 2020-03-03 RX ADMIN — MIDAZOLAM 2 MG: 1 INJECTION INTRAMUSCULAR; INTRAVENOUS at 09:40

## 2020-03-03 RX ADMIN — PROPOFOL 20 MG: 10 INJECTION, EMULSION INTRAVENOUS at 10:46

## 2020-03-03 RX ADMIN — PROPOFOL 75 MCG/KG/MIN: 10 INJECTION, EMULSION INTRAVENOUS at 09:47

## 2020-03-03 RX ADMIN — ONDANSETRON 4 MG: 2 INJECTION INTRAMUSCULAR; INTRAVENOUS at 09:48

## 2020-03-03 ASSESSMENT — LIFESTYLE VARIABLES: TOBACCO_USE: 1

## 2020-03-03 NOTE — DISCHARGE INSTRUCTIONS
[facility] Endoscopic Ultrasound with Fine Needle aspiration and Upper Endoscopy with biopsies, along with Monitored Anesthesia Care by Dr Plata  For 24 hours after your procedure  Sedation:  1. Get plenty of rest. A responsible adult must stay with you for at least 24 hours after you leave the hospital.   2. Do not drive or use heavy equipment. If you have weakness or tingling, don't drive or use heavy equipment until this feeling goes away.  3. Do not drink alcohol.  4. Avoid strenuous or risky activities. Ask for help when climbing stairs.   5. You may feel lightheaded. IF so, sit for a few minutes before standing. Have someone help you get up.   6. If you have nausea (feel sick to your stomach): Drink only clear liquids such as apple juice, ginger ale, broth or 7-Up. Rest may also help. Be sure to drink enough fluids. Move to a regular diet as you feel able.  7. You may have a slight fever. Call the doctor if your fever is over 100 F (37.7 C) (taken under the tongue) or lasts longer than 24 hours.  8. You may have a dry mouth, a sore throat, muscle aches or trouble sleeping. These should go away after 24 hours.  9. Do not make important or legal decisions.   Procedural:  1. Wait one hour before eating or drinking. Start with sips of water. When your gag reflex has returned, you may return to your normal diet, medicines, and light exercise.  2. Some bloating is normal. You may have large burps or pass air.  3. You may have a sore throat for 2 to 3 days. If so, it may help to:    Use sore throat lozenges.    Gargle as need with salt water up to 4 times a day. Mix 1 cup of warm water with 1 teaspoon of salt. Do not swallow.  4. You may take Tylenol (acetaminophen) for pain unless your doctor has told you not to.   Call right away if you have:  1. Unusual pain in belly or chest pain not relieved with passing air.  2. Severe throat pain or trouble swallowing.  3. Black stools (tar-like looking bowel movement).  4. Signs  of infection (fever).  Follow-up:  __X__ We took small tissue samples. Your doctor will send you the results within two weeks.  If you have severe pain, bleeding, vomit blood, or shortness of breath, go to an emergency room.  If you have questions, call:  Endoscopy: Monday to Friday, 7 a.m. to 4:30 p.m. [phone] (We may have to call you back)  After hours: Hospital  [phone] (Ask for the GI fellow on call)

## 2020-03-03 NOTE — ANESTHESIA PREPROCEDURE EVALUATION
Anesthesia Pre-Procedure Evaluation    Patient: Nathalie Bashir   MRN:     [id number] Gender:   female   Age:    54 year old :      1965        Preoperative Diagnosis: Malignant neoplasm of body of stomach (H) [C16.2]   Procedure(s):  ESOPHAGOGASTRODUODENOSCOPY, WITH ENDOSCOPIC US (enoxaparin)     LABS:  CBC:   Lab Results   Component Value Date    WBC 8.0 2020    WBC 7.2 2020    HGB 9.8 (L) 2020    HGB 10.8 (L) 2020    HCT 30.6 (L) 2020    HCT 34.5 (L) 2020     2020     (H) 2020     BMP:   Lab Results   Component Value Date     2020     02/15/2020    POTASSIUM 4.0 2020    POTASSIUM 4.2 02/15/2020    CHLORIDE 105 2020    CHLORIDE 103 02/15/2020    CO2 28 2020    CO2 26 02/15/2020    BUN 7 2020    BUN 14 02/15/2020    CR 0.75 2020    CR 0.77 02/15/2020    GLC 91 2020     (H) 02/15/2020     COAGS:   Lab Results   Component Value Date    INR 1.21 (H) 02/10/2020     POC:   Lab Results   Component Value Date     (H) 2020    HCG Negative 2015     OTHER:   Lab Results   Component Value Date    LACT 0.8 2020    PETRA 9.1 2020    PHOS 3.4 2020    MAG 2.2 2020    ALBUMIN 2.8 (L) 2020    PROTTOTAL 8.0 2020    ALT 20 2020    AST 30 2020    ALKPHOS 158 (H) 2020    BILITOTAL 0.3 2020    LIPASE 37 (L) 2020    AMYLASE 13 (L) 2020    CRP 14.3 (H) 2016        Preop Vitals    BP Readings from Last 3 Encounters:   20 106/66   20 116/76   02/15/20 102/59    Pulse Readings from Last 3 Encounters:   20 106   20 109   02/15/20 102      Resp Readings from Last 3 Encounters:   20 15   20 16   02/15/20 18    SpO2 Readings from Last 3 Encounters:   20 97%   20 98%   02/15/20 96%      Temp Readings from Last 1 Encounters:   20 36.7  C (98  F) (Oral)    Ht Readings from  "Last 1 Encounters:   02/27/20 1.727 m (5' 7.99\")      Wt Readings from Last 1 Encounters:   02/27/20 85.9 kg (189 lb 6.4 oz)    Estimated body mass index is 28.8 kg/m  as calculated from the following:    Height as of 2/27/20: 1.727 m (5' 7.99\").    Weight as of 2/27/20: 85.9 kg (189 lb 6.4 oz).     LDA:        Past Medical History:   Diagnosis Date     Allergic rhinitis      Depression      Lumbago      Migraine      Other chronic pain     low back     Sacroiliitis (H)     low back pain     Trochanteric bursitis     leg length discrrepancy, hip pain      Past Surgical History:   Procedure Laterality Date     ARTHROPLASTY HIP Left 2016     BACK SURGERY      L4-5 decompression     C REPAIR DETACH RETINA,SCLERAL BUCKLE       CATARACT IOL, RT/LT       CHOLECYSTECTOMY N/A 1/28/2020    Procedure: Cholecystectomy;  Surgeon: Yandel Mallory MD;  Location: [facility]     EYE SURGERY       OPEN REDUCTION INTERNAL FIXATION RODDING INTRAMEDULLARY FEMUR Left 12/23/2014    Procedure: OPEN REDUCTION INTERNAL FIXATION RODDING INTRAMEDULLARY FEMUR;  Surgeon: Arnol Santiago MD;  [facility]: [facility]     ORTHOPEDIC SURGERY       PICC DOUBLE LUMEN PLACEMENT Right 02/07/2020    5 fr double lumen 41 cm     REMOVE HARDWARE LOWER EXTREMITY Left 4/7/2015    Procedure: REMOVE HARDWARE LOWER EXTREMITY;  Surgeon: Arnol Santiago MD;  [facility]     REMOVE HARDWARE RODDING INTRAMEDULLARY FEMUR Left 12/17/2015    Procedure: REMOVE HARDWARE RODDING INTRAMEDULLARY FEMUR;  Surgeon: Arnol Santiago MD;  [facility]     RESECT BONE LOWER EXTREMITY Left 12/23/2014    Procedure: RESECT BONE LOWER EXTREMITY;  Surgeon: Arnol Santiago MD;  [facility]     WHIPPLE PROCEDURE N/A 1/28/2020    Procedure: Open Whipple procedure and Cholecystectomy;  Surgeon: Yandel Mallory MD;  Location: [facility]      Allergies   Allergen Reactions     Gluten Meal Palpitations     Pregabalin      interfered with Cymbalta     Augmentin Rash     Acetaminophen " Nausea and Other (See Comments)     Urine retention       Dust Mite Extract      Gabapentin GI Disturbance     dizziness     Methadone Fatigue     Mold      Naprosyn [Naproxen] GI Disturbance     dizziness     Oxycontin [Oxycodone]      Confusion, loopy, oxycodone is tolerated     Oxymetholone      Seasonal Allergies      Topiramate Other (See Comments)     Tongue numbness, taste alteration, irritable      Latex Rash        Anesthesia Evaluation     . Pt has had prior anesthetic. Type: General and MAC    No history of anesthetic complications          ROS/MED HX    ENT/Pulmonary:     (+)tobacco use, Current use 1/2 packs/day  , . .    Neurologic:     (+)neuropathy migraines,     Cardiovascular:  - neg cardiovascular ROS   (+) ----. : . . . :. . Previous cardiac testing date:results:date: results:ECG reviewed date:2/4/20 results:Sinus tachycardia 116  Nonspecific T wave abnormality date: results:          METS/Exercise Tolerance: Comment: Limited by pain    Hematologic:     (+) Anemia, -      Musculoskeletal: Comment: Back, neg, leg, hip pain  (+) arthritis,  -       GI/Hepatic: Comment: S/P whipple         Renal/Genitourinary:  - ROS Renal section negative       Endo:  - neg endo ROS       Psychiatric:     (+) psychiatric history depression      Infectious Disease:  - neg infectious disease ROS       Malignancy:   (+) Malignancy History of GI  GI CA  Active status post Surgery,         Other: Comment: Oxycodone 30 mg/ d   (+) H/O Chronic Pain,H/O chronic opiod use ,                        PHYSICAL EXAM:   Mental Status/Neuro: A/A/O   Airway: Facies: Feasible  Mallampati: I  Mouth/Opening: Full  TM distance: > 6 cm  Neck ROM: Full   Respiratory: Auscultation: CTAB     Resp. Rate: Normal     Resp. Effort: Normal      CV: Rhythm: Regular  Rate: Age appropriate  Heart: Normal Sounds  Edema: None   Comments:      Dental: Normal Dentition                Assessment:   ASA SCORE: 3    H&P: History and physical reviewed and  following examination; no interval change.   Smoking Status:  Active Smoker   NPO Status: NPO Appropriate     Plan:   Anes. Type:  MAC   Pre-Medication: None   Induction:  IV (Standard)   Airway: Native Airway   Access/Monitoring: PIV   Maintenance: Propofol Sedation     Postop Plan:   Postop Pain: None  Postop Sedation/Airway: Not planned  Disposition: Outpatient     PONV Management: Adult Risk Factors: Female   Prevention:, Propofol     CONSENT: Direct conversation   Plan and risks discussed with: Patient   Blood Products: Consent Deferred (Minimal Blood Loss)                   Rosalinda Lemos MD

## 2020-03-03 NOTE — ANESTHESIA CARE TRANSFER NOTE
Patient: Nathalie Bashir    Procedure(s):  ESOPHAGOGASTRODUODENOSCOPY, WITH ENDOSCOPIC US (enoxaparin)    Diagnosis: Malignant neoplasm of body of stomach (H) [C16.2]  Diagnosis Additional Information: No value filed.    Anesthesia Type:   MAC     Note:  Airway :Room Air  Patient transferred to:PACU  Handoff Report: Identifed the Patient, Identified the Reponsible Provider, Reviewed the pertinent medical history, Discussed the surgical course, Reviewed Intra-OP anesthesia mangement and issues during anesthesia, Set expectations for post-procedure period and Allowed opportunity for questions and acknowledgement of understanding      Vitals: (Last set prior to Anesthesia Care Transfer)    CRNA VITALS  3/3/2020 1026 - 3/3/2020 1103      3/3/2020             Pulse:  107    Ht Rate:  106    SpO2:  100 %                Electronically Signed By: Lily Bentley DDS  March 3, 2020  11:03 AM

## 2020-03-03 NOTE — ANESTHESIA POSTPROCEDURE EVALUATION
Anesthesia POST Procedure Evaluation    Patient: Nathalie Bashir   MRN:     [id number] Gender:   female   Age:    54 year old :      1965        Preoperative Diagnosis: Malignant neoplasm of body of stomach (H) [C16.2]   Procedure(s):  ESOPHAGOGASTRODUODENOSCOPY, WITH ENDOSCOPIC US (enoxaparin)   Postop Comments: No value filed.     Anesthesia Type: MAC       Disposition: Outpatient   Postop Pain Control: Uneventful            Sign Out: Well controlled pain   PONV: No   Neuro/Psych: Uneventful            Sign Out: Acceptable/Baseline neuro status   Airway/Respiratory: Uneventful            Sign Out: Acceptable/Baseline resp. status   CV/Hemodynamics: Uneventful            Sign Out: Acceptable CV status   Other NRE: NONE   DID A NON-ROUTINE EVENT OCCUR? No         Last Anesthesia Record Vitals:  CRNA VITALS  3/3/2020 1026 - 3/3/2020 1124      3/3/2020             Pulse:  107    Ht Rate:  106    SpO2:  100 %          Last PACU Vitals:  Vitals Value Taken Time   BP     Temp     Pulse     Resp     SpO2     Temp src     NIBP 114/74 3/3/2020 10:51 AM   Pulse 107 3/3/2020 10:55 AM   SpO2 100 % 3/3/2020 10:55 AM   Resp     Temp     Ht Rate 106 3/3/2020 10:55 AM   Temp 2           Electronically Signed By: Rosalinda Lemos MD, March 3, 2020, 11:24 AM

## 2020-03-04 ENCOUNTER — TELEPHONE (OUTPATIENT)
Dept: ONCOLOGY | Facility: CLINIC | Age: 55
End: 2020-03-04

## 2020-03-04 LAB — COPATH REPORT: NORMAL

## 2020-03-04 NOTE — TELEPHONE ENCOUNTER
Pt confirms she is still tobacco free and has no desire to smoke at this time. Will reach out if she has relapse concerns.     Tobacco Treatment Program at the [facility] attempted to reach Ms. Bashir on 3/4/2020 regarding the tobacco cessation program to help Ms. Bashir to quit smoking.     Jada Parnell, [facility]  Tobacco Treatment Specialist  PH: [phone]

## 2020-03-05 LAB — COPATH REPORT: NORMAL

## 2020-03-06 PROCEDURE — 99285 EMERGENCY DEPT VISIT HI MDM: CPT | Mod: 25 | Performed by: EMERGENCY MEDICINE

## 2020-03-06 ASSESSMENT — MIFFLIN-ST. JEOR: SCORE: 1497.19

## 2020-03-07 ENCOUNTER — HOSPITAL ENCOUNTER (EMERGENCY)
Facility: CLINIC | Age: 55
Discharge: HOME | End: 2020-03-07
Attending: EMERGENCY MEDICINE | Admitting: EMERGENCY MEDICINE
Payer: MEDICARE

## 2020-03-07 VITALS
WEIGHT: 186 LBS | TEMPERATURE: 98.4 F | HEART RATE: 98 BPM | HEIGHT: 68 IN | OXYGEN SATURATION: 96 % | DIASTOLIC BLOOD PRESSURE: 60 MMHG | RESPIRATION RATE: 14 BRPM | BODY MASS INDEX: 28.19 KG/M2 | SYSTOLIC BLOOD PRESSURE: 108 MMHG

## 2020-03-07 DIAGNOSIS — R10.84 ABDOMINAL PAIN, GENERALIZED: ICD-10-CM

## 2020-03-07 DIAGNOSIS — G89.18 POSTOPERATIVE PAIN: ICD-10-CM

## 2020-03-07 LAB
ALBUMIN SERPL-MCNC: 2.7 G/DL (ref 3.4–5)
ALP SERPL-CCNC: 150 U/L (ref 40–150)
ALT SERPL W P-5'-P-CCNC: 16 U/L (ref 0–50)
ANION GAP SERPL CALCULATED.3IONS-SCNC: 6 MMOL/L (ref 3–14)
AST SERPL W P-5'-P-CCNC: 16 U/L (ref 0–45)
BASOPHILS # BLD AUTO: 0.1 10E9/L (ref 0–0.2)
BASOPHILS NFR BLD AUTO: 1 %
BILIRUB SERPL-MCNC: 0.1 MG/DL (ref 0.2–1.3)
BUN SERPL-MCNC: 6 MG/DL (ref 7–30)
CALCIUM SERPL-MCNC: 8 MG/DL (ref 8.5–10.1)
CHLORIDE SERPL-SCNC: 109 MMOL/L (ref 94–109)
CO2 SERPL-SCNC: 23 MMOL/L (ref 20–32)
CREAT SERPL-MCNC: 0.74 MG/DL (ref 0.52–1.04)
DIFFERENTIAL METHOD BLD: ABNORMAL
EOSINOPHIL # BLD AUTO: 0.4 10E9/L (ref 0–0.7)
EOSINOPHIL NFR BLD AUTO: 5.1 %
ERYTHROCYTE [DISTWIDTH] IN BLOOD BY AUTOMATED COUNT: 14.9 % (ref 10–15)
GFR SERPL CREATININE-BSD FRML MDRD: >90 ML/MIN/{1.73_M2}
GLUCOSE SERPL-MCNC: 101 MG/DL (ref 70–99)
HCT VFR BLD AUTO: 31 % (ref 35–47)
HGB BLD-MCNC: 9.7 G/DL (ref 11.7–15.7)
IMM GRANULOCYTES # BLD: 0 10E9/L (ref 0–0.4)
IMM GRANULOCYTES NFR BLD: 0.4 %
LIPASE SERPL-CCNC: 96 U/L (ref 73–393)
LYMPHOCYTES # BLD AUTO: 2.7 10E9/L (ref 0.8–5.3)
LYMPHOCYTES NFR BLD AUTO: 37.4 %
MCH RBC QN AUTO: 27.6 PG (ref 26.5–33)
MCHC RBC AUTO-ENTMCNC: 31.3 G/DL (ref 31.5–36.5)
MCV RBC AUTO: 88 FL (ref 78–100)
MONOCYTES # BLD AUTO: 0.7 10E9/L (ref 0–1.3)
MONOCYTES NFR BLD AUTO: 9.3 %
NEUTROPHILS # BLD AUTO: 3.4 10E9/L (ref 1.6–8.3)
NEUTROPHILS NFR BLD AUTO: 46.8 %
NRBC # BLD AUTO: 0 10*3/UL
NRBC BLD AUTO-RTO: 0 /100
PLATELET # BLD AUTO: 395 10E9/L (ref 150–450)
POTASSIUM SERPL-SCNC: 3.8 MMOL/L (ref 3.4–5.3)
PROT SERPL-MCNC: 6.7 G/DL (ref 6.8–8.8)
RBC # BLD AUTO: 3.52 10E12/L (ref 3.8–5.2)
SODIUM SERPL-SCNC: 138 MMOL/L (ref 133–144)
WBC # BLD AUTO: 7.2 10E9/L (ref 4–11)

## 2020-03-07 PROCEDURE — 83690 ASSAY OF LIPASE: CPT | Performed by: EMERGENCY MEDICINE

## 2020-03-07 PROCEDURE — 96374 THER/PROPH/DIAG INJ IV PUSH: CPT | Performed by: EMERGENCY MEDICINE

## 2020-03-07 PROCEDURE — 99284 EMERGENCY DEPT VISIT MOD MDM: CPT | Mod: Z6 | Performed by: EMERGENCY MEDICINE

## 2020-03-07 PROCEDURE — 80053 COMPREHEN METABOLIC PANEL: CPT | Performed by: EMERGENCY MEDICINE

## 2020-03-07 PROCEDURE — 85025 COMPLETE CBC W/AUTO DIFF WBC: CPT | Performed by: EMERGENCY MEDICINE

## 2020-03-07 PROCEDURE — 25000128 H RX IP 250 OP 636: Performed by: EMERGENCY MEDICINE

## 2020-03-07 PROCEDURE — 25000132 ZZH RX MED GY IP 250 OP 250 PS 637: Mod: GY | Performed by: EMERGENCY MEDICINE

## 2020-03-07 RX ORDER — HYDROMORPHONE HYDROCHLORIDE 1 MG/ML
0.5 INJECTION, SOLUTION INTRAMUSCULAR; INTRAVENOUS; SUBCUTANEOUS
Status: COMPLETED | OUTPATIENT
Start: 2020-03-07 | End: 2020-03-07

## 2020-03-07 RX ORDER — OXYCODONE HYDROCHLORIDE 5 MG/1
5 TABLET ORAL ONCE
Status: COMPLETED | OUTPATIENT
Start: 2020-03-07 | End: 2020-03-07

## 2020-03-07 RX ADMIN — HYDROMORPHONE HYDROCHLORIDE 0.5 MG: 1 INJECTION, SOLUTION INTRAMUSCULAR; INTRAVENOUS; SUBCUTANEOUS at 01:51

## 2020-03-07 RX ADMIN — OXYCODONE HYDROCHLORIDE 5 MG: 5 TABLET ORAL at 03:35

## 2020-03-07 ASSESSMENT — ENCOUNTER SYMPTOMS
DIFFICULTY URINATING: 0
COLOR CHANGE: 0
NECK STIFFNESS: 0
CONFUSION: 0
BLOOD IN STOOL: 0
DYSURIA: 0
ARTHRALGIAS: 0
EYE REDNESS: 0
NAUSEA: 1
FEVER: 0
ABDOMINAL PAIN: 1
HEADACHES: 0
SHORTNESS OF BREATH: 0
VOMITING: 0

## 2020-03-07 NOTE — ED AVS SNAPSHOT
[facility], [place], Emergency Department  [address] [address]  Phone:  [phone]                                    Nathalie Bashir   MRN: [id number]    Department:  [facility], Emergency Department   Date of Visit:  3/6/2020           After Visit Summary Signature Page    I have received my discharge instructions, and my questions have been answered. I have discussed any challenges I see with this plan with the nurse or doctor.    ..........................................................................................................................................  Patient/Patient Representative Signature      ..........................................................................................................................................  Patient Representative Print Name and Relationship to Patient    ..................................................               ................................................  Date                                   Time    ..........................................................................................................................................  Reviewed by Signature/Title    ...................................................              ..............................................  Date                                               Time          22EPIC Rev 08/18

## 2020-03-07 NOTE — ED TRIAGE NOTES
Pt c/o inflammation from surgery (had whipple on 1/28/20) and felt like she pulled something inside RLQ. Denies n/v, endorses diarrhea. Denies fevers.

## 2020-03-07 NOTE — ED PROVIDER NOTES
"    [facility] EMERGENCY DEPARTMENT ([facility])  3/07/20    History     Chief Complaint   Patient presents with     Abdominal Pain     HPI  Nathalie Bashir is a 54 year old female with a history signet ring type gastric adenocarcinoma, HER-2 negative and s/p Whipple and cholecystectomy (1/28/20) for suspected IPMN, who presents for evaluation of right-sided abdominal pain. Patient reports she has had some right-sided abdominal pain since her Whipple on 1/28/20; however, last night, she states she felt like she \"tore something\" in her abdomen while she was changing positions and twisting her torso on the couch. She reports her abdominal pain worsened around 6:30 PM last night. Patient complains of increased pain in the right side of her abdomen with any movements. She states this pain feels similar to a hernia she has had in a past.  She reports she was nauseous all day yesterday, but denies vomiting. She denies recent trauma or heavy lifting. Patient states she has been taking her home pain medications without relief. She states she had diarrhea yesterday morning, but she has typically been having normal bowel movements. She denies blood in her stool. She denies complications with her recent endoscopy on 3/3/20. Of note, patient reports she was started on Bactrim on 3/3/20 for a UTI. She denies fever, dysuria, or difficulty urinating.     Upper EUS - [facility] (3/3/2020)  Indications:           Gastric adenocarcinoma/Post-surgery. Unexpected findings at time of Whipple resection infiltrative signet ring adenocarcinoma with positive resection margin.     PAST MEDICAL HISTORY  Past Medical History:   Diagnosis Date     Allergic rhinitis      Depression      Lumbago      Migraine      Other chronic pain     low back     Sacroiliitis (H)     low back pain     Trochanteric bursitis     leg length discrrepancy, hip pain     PAST SURGICAL HISTORY  Past Surgical History:   Procedure Laterality Date     ARTHROPLASTY HIP Left " 2016     BACK SURGERY      L4-5 decompression     C REPAIR DETACH RETINA,SCLERAL BUCKLE       CATARACT IOL, RT/LT       CHOLECYSTECTOMY N/A 2020    Procedure: Cholecystectomy;  Surgeon: Yandel Mallory MD;  Location: [facility] OR     ESOPHAGOSCOPY, GASTROSCOPY, DUODENOSCOPY (EGD), COMBINED N/A 3/3/2020    Procedure: ESOPHAGOGASTRODUODENOSCOPY, WITH ENDOSCOPIC US (enoxaparin);  Surgeon: Bakari Plata MD;  Location: [facility] GI     EYE SURGERY       OPEN REDUCTION INTERNAL FIXATION RODDING INTRAMEDULLARY FEMUR Left 2014    Procedure: OPEN REDUCTION INTERNAL FIXATION RODDING INTRAMEDULLARY FEMUR;  Surgeon: Arnol Santiago MD;  [facility]     ORTHOPEDIC SURGERY       PICC DOUBLE LUMEN PLACEMENT Right 2020    5 fr double lumen 41 cm     REMOVE HARDWARE LOWER EXTREMITY Left 2015    Procedure: REMOVE HARDWARE LOWER EXTREMITY;  Surgeon: Arnol Santiago MD;  [facility]     REMOVE HARDWARE RODDING INTRAMEDULLARY FEMUR Left 2015    Procedure: REMOVE HARDWARE RODDING INTRAMEDULLARY FEMUR;  Surgeon: Arnol Santiago MD;  [facility]     RESECT BONE LOWER EXTREMITY Left 2014    Procedure: RESECT BONE LOWER EXTREMITY;  Surgeon: Arnol Santiago MD;  [facility]     WHIPPLE PROCEDURE N/A 2020    Procedure: Open Whipple procedure and Cholecystectomy;  Surgeon: Yandel Mallory MD;  Location:  OR     FAMILY HISTORY  Family History   Problem Relation Age of Onset     Heart Failure Mother 77     Anesthesia Reaction No family hx of      Deep Vein Thrombosis No family hx of      SOCIAL HISTORY  Social History     Tobacco Use     Smoking status: Former Smoker     Packs/day: 0.50     Years: 25.00     Pack years: 12.50     Types: Cigarettes     Last attempt to quit: 2020     Years since quittin.1     Smokeless tobacco: Never Used   Substance Use Topics     Alcohol use: No     MEDICATIONS  No current facility-administered medications for this encounter.      Current  Outpatient Medications   Medication     amitriptyline (ELAVIL) 25 MG tablet     calcium carb 1250 mg, 500 mg Kenaitze,/vitamin D 200 units (OSCAL WITH D) 500-200 MG-UNIT per tablet     citalopram (CELEXA) 20 MG tablet     diclofenac (FLECTOR) 1.3 % patch     fluticasone (FLONASE) 50 MCG/ACT nasal spray     hydrOXYzine (ATARAX) 10 MG tablet     methocarbamol (ROBAXIN) 500 MG tablet     multivitamin, therapeutic with minerals (MULTI-VITAMIN) TABS     oxyCODONE IR (ROXICODONE) 10 MG tablet     pantoprazole (PROTONIX) 40 MG EC tablet     SUMAtriptan (IMITREX) 100 MG tablet     tiZANidine (ZANAFLEX) 4 MG tablet     ALLERGIES  Allergies   Allergen Reactions     Gluten Meal Palpitations     Pregabalin      interfered with Cymbalta     Augmentin Rash     Acetaminophen Nausea and Other (See Comments)     Urine retention       Dust Mite Extract      Gabapentin GI Disturbance     dizziness     Methadone Fatigue     Mold      Naprosyn [Naproxen] GI Disturbance     dizziness     Oxycontin [Oxycodone]      Confusion, loopy, oxycodone is tolerated     Oxymetholone      Seasonal Allergies      Topiramate Other (See Comments)     Tongue numbness, taste alteration, irritable      Latex Rash       I have reviewed the Medications, Allergies, Past Medical and Surgical History, and Social History in the Epic system.    Review of Systems   Constitutional: Negative for fever.   HENT: Negative for congestion.    Eyes: Negative for redness.   Respiratory: Negative for shortness of breath.    Cardiovascular: Negative for chest pain.   Gastrointestinal: Positive for abdominal pain and nausea. Negative for blood in stool and vomiting.   Genitourinary: Negative for difficulty urinating and dysuria.   Musculoskeletal: Negative for arthralgias and neck stiffness.   Skin: Negative for color change.   Neurological: Negative for headaches.   Psychiatric/Behavioral: Negative for confusion.       Physical Exam   BP: 106/52  Pulse: 100  Temp: 98.4  F (36.9  " C)  Resp: 16  Height: 172.7 cm (5' 8\")  Weight: 84.4 kg (186 lb)  SpO2: 98 %      Physical Exam  General: Afebrile, no acute distress. Appears stated age.   HENT: MMM, no oropharyngeal lesions  Eyes: PERRL, normal sclerae  Neck: non-tender, supple  Cardio: regular rate. Regular rhythm. Extremities well perfused  Resp: Normal work of breathing, clear breath sounds  Chest/Back: no visual signs of trauma, no CVA tenderness  Abdomen:soft, no tenderness to palpation, non-distended, no rebound, no guarding; midline surgical wound healing well - small area in middle that is healing (scabbed over) from recent wound dehiscence, no erythema, no drainage, no peritoneal signs  Neuro: alert and fully oriented. CN II-XII grossly intact. Grossly normal strength and sensation in all extremities.   MSK: no deformities.   Integumentary/Skin: no rash visualized, normal color  Psych: normal affect, normal behavior    ED Course        Procedures    .  Results for orders placed or performed during the hospital encounter of 03/07/20   Lipase     Status: None   Result Value Ref Range    Lipase 96 73 - 393 U/L   CBC with platelets differential     Status: Abnormal   Result Value Ref Range    WBC 7.2 4.0 - 11.0 10e9/L    RBC Count 3.52 (L) 3.8 - 5.2 10e12/L    Hemoglobin 9.7 (L) 11.7 - 15.7 g/dL    Hematocrit 31.0 (L) 35.0 - 47.0 %    MCV 88 78 - 100 fl    MCH 27.6 26.5 - 33.0 pg    MCHC 31.3 (L) 31.5 - 36.5 g/dL    RDW 14.9 10.0 - 15.0 %    Platelet Count 395 150 - 450 10e9/L    Diff Method Automated Method     % Neutrophils 46.8 %    % Lymphocytes 37.4 %    % Monocytes 9.3 %    % Eosinophils 5.1 %    % Basophils 1.0 %    % Immature Granulocytes 0.4 %    Nucleated RBCs 0 0 /100    Absolute Neutrophil 3.4 1.6 - 8.3 10e9/L    Absolute Lymphocytes 2.7 0.8 - 5.3 10e9/L    Absolute Monocytes 0.7 0.0 - 1.3 10e9/L    Absolute Eosinophils 0.4 0.0 - 0.7 10e9/L    Absolute Basophils 0.1 0.0 - 0.2 10e9/L    Abs Immature Granulocytes 0.0 0 - 0.4 " 10e9/L    Absolute Nucleated RBC 0.0    Comprehensive metabolic panel     Status: Abnormal   Result Value Ref Range    Sodium 138 133 - 144 mmol/L    Potassium 3.8 3.4 - 5.3 mmol/L    Chloride 109 94 - 109 mmol/L    Carbon Dioxide 23 20 - 32 mmol/L    Anion Gap 6 3 - 14 mmol/L    Glucose 101 (H) 70 - 99 mg/dL    Urea Nitrogen 6 (L) 7 - 30 mg/dL    Creatinine 0.74 0.52 - 1.04 mg/dL    GFR Estimate >90 >60 mL/min/[1.73_m2]    GFR Estimate If Black >90 >60 mL/min/[1.73_m2]    Calcium 8.0 (L) 8.5 - 10.1 mg/dL    Bilirubin Total 0.1 (L) 0.2 - 1.3 mg/dL    Albumin 2.7 (L) 3.4 - 5.0 g/dL    Protein Total 6.7 (L) 6.8 - 8.8 g/dL    Alkaline Phosphatase 150 40 - 150 U/L    ALT 16 0 - 50 U/L    AST 16 0 - 45 U/L       Assessments & Plan (with Medical Decision Making)   Nathalie Bashir is a 54 year old female with a history signet ring type gastric adenocarcinoma, HER-2 negative and s/p Whipple and cholecystectomy (1/28/20) for suspected IPMN, who presents for evaluation of right-sided abdominal pain. Upon arrival patient is well-appearing, afebrile, no distress.  Patient is resting comfortably.  Abdomen is soft with no tenderness to palpation.  Patient has been having ongoing pain since her surgical procedure the end of January, reports some worsening pain in the right lower abdomen today after moving. Given patient's clinical history of of pain that started after moving/twisting, and is present with movement likely musculoskeletal/muscle strain.  Patient has no clinical signs or symptoms of infection, obstruction, abscess, has a nontender nonsurgical abdomen.     I reviewed comprehensive labs which are unremarkable with white blood cell count 7.2, hemoglobin 9.7, no acute metabolic or electrolyte abnormality, normal lipase.  Discussed results with patient and her , patient is feeling better after pain medication and feels comfortable with discharge home and close outpatient follow-up with her primary care provider and  surgeons.  At this time recommend imaging at this time however I do strongly recommend that the patient has continued pain, worsening pain, high fever, persistent vomiting, and worsening symptoms to return to the emergency department.  Patient and  understand and agree with plan. .The patient is discharged home with instructions to return if their symptoms persist or worsen.  Plan for close follow-up with their primary physician.  I discussed workup, results, treatment, and plan with the patient.  Patient understands and agrees with the plan.      I have reviewed the nursing notes.    I have reviewed the findings, diagnosis, plan and need for follow up with the patient.    Discharge Medication List as of 3/7/2020  3:31 AM          Final diagnoses:   Abdominal pain, generalized   Postoperative pain     IBeverly, am serving as a trained medical scribe to document services personally performed by Kathleen Araujo MD, based on the provider's statements to me.      Kathleen GRIGGS MD, was physically present and have reviewed and verified the accuracy of this note documented by Beverly Pulido.    3/6/2020   [facility], [place], EMERGENCY DEPARTMENT     Kathleen Araujo MD  03/07/20 [id number]

## 2020-03-07 NOTE — DISCHARGE INSTRUCTIONS
Thank you for your patience today.  Please follow-up with your regular doctor in the next 2-3 days for further evaluation and follow-up care.  Please call to schedule an appointment.  Please continue your own medications.  Please return to the ER if you develop high fever, persistent vomiting, severe pain, or any worsening of your current symptoms.  It was a pleasure taking care of you today.  We hope you feel better soon.

## 2020-03-10 ENCOUNTER — TELEPHONE (OUTPATIENT)
Dept: ONCOLOGY | Facility: CLINIC | Age: 55
End: 2020-03-10

## 2020-03-10 ENCOUNTER — HOSPITAL ENCOUNTER (EMERGENCY)
Facility: CLINIC | Age: 55
Discharge: HOME | End: 2020-03-10
Attending: EMERGENCY MEDICINE | Admitting: EMERGENCY MEDICINE
Payer: MEDICARE

## 2020-03-10 VITALS
TEMPERATURE: 98.3 F | SYSTOLIC BLOOD PRESSURE: 112 MMHG | DIASTOLIC BLOOD PRESSURE: 58 MMHG | BODY MASS INDEX: 26.83 KG/M2 | HEIGHT: 68 IN | WEIGHT: 177 LBS | RESPIRATION RATE: 14 BRPM | HEART RATE: 94 BPM | OXYGEN SATURATION: 97 %

## 2020-03-10 DIAGNOSIS — E86.0 DEHYDRATION: ICD-10-CM

## 2020-03-10 LAB
ALBUMIN SERPL-MCNC: 3.2 G/DL (ref 3.4–5)
ALP SERPL-CCNC: 166 U/L (ref 40–150)
ALT SERPL W P-5'-P-CCNC: 17 U/L (ref 0–50)
ANION GAP SERPL CALCULATED.3IONS-SCNC: 7 MMOL/L (ref 3–14)
AST SERPL W P-5'-P-CCNC: 10 U/L (ref 0–45)
BASOPHILS # BLD AUTO: 0.1 10E9/L (ref 0–0.2)
BASOPHILS NFR BLD AUTO: 0.5 %
BILIRUB SERPL-MCNC: 0.2 MG/DL (ref 0.2–1.3)
BUN SERPL-MCNC: 11 MG/DL (ref 7–30)
CALCIUM SERPL-MCNC: 8.8 MG/DL (ref 8.5–10.1)
CHLORIDE SERPL-SCNC: 101 MMOL/L (ref 94–109)
CO2 SERPL-SCNC: 21 MMOL/L (ref 20–32)
CREAT SERPL-MCNC: 1.06 MG/DL (ref 0.52–1.04)
DIFFERENTIAL METHOD BLD: ABNORMAL
EOSINOPHIL # BLD AUTO: 0.3 10E9/L (ref 0–0.7)
EOSINOPHIL NFR BLD AUTO: 2.4 %
ERYTHROCYTE [DISTWIDTH] IN BLOOD BY AUTOMATED COUNT: 14.7 % (ref 10–15)
GFR SERPL CREATININE-BSD FRML MDRD: 60 ML/MIN/{1.73_M2}
GLUCOSE SERPL-MCNC: 115 MG/DL (ref 70–99)
HCT VFR BLD AUTO: 34.9 % (ref 35–47)
HGB BLD-MCNC: 11.2 G/DL (ref 11.7–15.7)
IMM GRANULOCYTES # BLD: 0 10E9/L (ref 0–0.4)
IMM GRANULOCYTES NFR BLD: 0.4 %
INTERPRETATION ECG - MUSE: NORMAL
LYMPHOCYTES # BLD AUTO: 2.9 10E9/L (ref 0.8–5.3)
LYMPHOCYTES NFR BLD AUTO: 27.6 %
MCH RBC QN AUTO: 27.9 PG (ref 26.5–33)
MCHC RBC AUTO-ENTMCNC: 32.1 G/DL (ref 31.5–36.5)
MCV RBC AUTO: 87 FL (ref 78–100)
MONOCYTES # BLD AUTO: 0.8 10E9/L (ref 0–1.3)
MONOCYTES NFR BLD AUTO: 7.6 %
NEUTROPHILS # BLD AUTO: 6.5 10E9/L (ref 1.6–8.3)
NEUTROPHILS NFR BLD AUTO: 61.5 %
NRBC # BLD AUTO: 0 10*3/UL
NRBC BLD AUTO-RTO: 0 /100
PLATELET # BLD AUTO: 450 10E9/L (ref 150–450)
POTASSIUM SERPL-SCNC: 3.9 MMOL/L (ref 3.4–5.3)
PROT SERPL-MCNC: 7.8 G/DL (ref 6.8–8.8)
RBC # BLD AUTO: 4.02 10E12/L (ref 3.8–5.2)
SODIUM SERPL-SCNC: 130 MMOL/L (ref 133–144)
TROPONIN I SERPL-MCNC: <0.015 UG/L (ref 0–0.04)
WBC # BLD AUTO: 10.6 10E9/L (ref 4–11)

## 2020-03-10 PROCEDURE — 80053 COMPREHEN METABOLIC PANEL: CPT | Performed by: EMERGENCY MEDICINE

## 2020-03-10 PROCEDURE — 25000132 ZZH RX MED GY IP 250 OP 250 PS 637: Mod: GY | Performed by: EMERGENCY MEDICINE

## 2020-03-10 PROCEDURE — 93005 ELECTROCARDIOGRAM TRACING: CPT | Performed by: EMERGENCY MEDICINE

## 2020-03-10 PROCEDURE — 84484 ASSAY OF TROPONIN QUANT: CPT | Performed by: EMERGENCY MEDICINE

## 2020-03-10 PROCEDURE — 25800030 ZZH RX IP 258 OP 636: Performed by: EMERGENCY MEDICINE

## 2020-03-10 PROCEDURE — 93010 ELECTROCARDIOGRAM REPORT: CPT | Mod: Z6 | Performed by: EMERGENCY MEDICINE

## 2020-03-10 PROCEDURE — 99284 EMERGENCY DEPT VISIT MOD MDM: CPT | Mod: 25 | Performed by: EMERGENCY MEDICINE

## 2020-03-10 PROCEDURE — 85025 COMPLETE CBC W/AUTO DIFF WBC: CPT | Performed by: EMERGENCY MEDICINE

## 2020-03-10 PROCEDURE — 96360 HYDRATION IV INFUSION INIT: CPT | Performed by: EMERGENCY MEDICINE

## 2020-03-10 PROCEDURE — 96361 HYDRATE IV INFUSION ADD-ON: CPT | Performed by: EMERGENCY MEDICINE

## 2020-03-10 RX ORDER — SODIUM CHLORIDE 9 MG/ML
1000 INJECTION, SOLUTION INTRAVENOUS CONTINUOUS
Status: DISCONTINUED | OUTPATIENT
Start: 2020-03-10 | End: 2020-03-11 | Stop reason: HOSPADM

## 2020-03-10 RX ORDER — OXYCODONE HYDROCHLORIDE 5 MG/1
5 TABLET ORAL ONCE
Status: COMPLETED | OUTPATIENT
Start: 2020-03-10 | End: 2020-03-10

## 2020-03-10 RX ADMIN — SODIUM CHLORIDE 1000 ML: 9 INJECTION, SOLUTION INTRAVENOUS at 18:54

## 2020-03-10 RX ADMIN — OXYCODONE HYDROCHLORIDE 5 MG: 5 TABLET ORAL at 22:20

## 2020-03-10 RX ADMIN — SODIUM CHLORIDE 1000 ML: 9 INJECTION, SOLUTION INTRAVENOUS at 21:45

## 2020-03-10 ASSESSMENT — ENCOUNTER SYMPTOMS
DIARRHEA: 0
FEVER: 0
HEMATURIA: 0
NAUSEA: 0
PALPITATIONS: 0
VOMITING: 0
CHILLS: 0
DIZZINESS: 1
DYSURIA: 0
SHORTNESS OF BREATH: 0

## 2020-03-10 ASSESSMENT — MIFFLIN-ST. JEOR: SCORE: 1456.37

## 2020-03-10 NOTE — TELEPHONE ENCOUNTER
Pt called back and stated she has been drinking Gatorade and checked her BP. She got lying down 89/67, sitting 96/65 and standing 87/54. She was walking from her chair to the stairs and almost fell down due to dizziness and couldn't make it up the stairs due to feeling faint. Advised pt to go to ED for evaluation, she will have her family members bring her here to [facility].

## 2020-03-10 NOTE — ED TRIAGE NOTES
Pt. Presents to ED with complaints of hypotensive accompanied by dizziness and feeling like she's going to pass out. Reports 82/58 @ clinic today but was sent home. A & O x 4, independent with cane, AVSS on RA.

## 2020-03-10 NOTE — ED AVS SNAPSHOT
[facility], [place], Emergency Department  [address] [address]  Phone:  [phone]                                    Nathalie Bashir   MRN: [id number]    Department:  [facility], Emergency Department   Date of Visit:  3/10/2020           After Visit Summary Signature Page    I have received my discharge instructions, and my questions have been answered. I have discussed any challenges I see with this plan with the nurse or doctor.    ..........................................................................................................................................  Patient/Patient Representative Signature      ..........................................................................................................................................  Patient Representative Print Name and Relationship to Patient    ..................................................               ................................................  Date                                   Time    ..........................................................................................................................................  Reviewed by Signature/Title    ...................................................              ..............................................  Date                                               Time          22EPIC Rev 08/18

## 2020-03-10 NOTE — TELEPHONE ENCOUNTER
Pt called in to triage reporting she went to her primary clinic today and BP was 82/58 pulse 103. Stated she has been dizzy the last 3 days but feels she is eating and drinking well, has not had any changes to her medications or signs of infection. She is not currently on treatment now, was seen in ED for abd pain 3 days ago. Continues to take home meds of prn oxycodone, tizanidine, hydroxyzine, citalopram. Today prescribed baclofen to replace tizanidine and piroxicam but has not picked up from the pharmacy yet. Next PET scan is 3/13 and follow-up with Dr. Pollard 3/19. Paged Dr. Pollard.    Per Dr. Pollard: recommend pt get BP cuff and check at home, if consistently under 100/60 then pt should be seen in clinic or if after hours go to ED. Pt informed of above, she verbalized understanding and state she does have a BP cuff at home. Instructed her how to do an orthostatic BP check and to keep track of all readings, call and update us in a few days. Push fluids including electrolyte drinks. Pt verbalized understanding.

## 2020-03-11 NOTE — DISCHARGE INSTRUCTIONS
Please make an appointment to follow up with Your Primary Care Provider as soon as possible as needed.

## 2020-03-11 NOTE — ED PROVIDER NOTES
ED Provider Note  [facility]      History     Chief Complaint   Patient presents with     Dizziness     Hypotension     HPI  Nathalie Bashir is a 54 year old female with a history signet ring type gastric adenocarcinoma, HER-2 negative and s/p Whipple and cholecystectomy (1/28/20) for suspected IPMN who presents to the Emergency Department today for evaluation of dizziness and hypotension.  Patient reports that she has been feeling dizzy and faint for the past 3 to 4 days.  The symptoms are reported to be worse with going from a lying to seated position in a seated to standing position.  She has checked her blood pressure at home and notes that it has been running low.  She otherwise reports that she has had some ear ringing.  The patient was in clinic today for management of her hypertension medications and was sent to the ED for further evaluation due to symptoms.  The patient denies chest pain, palpitation, vision changes, shortness of breath, nausea, vomiting, diarrhea, fever, chills, dysuria, hematuria.  No recent falls.    Past Medical History:   Diagnosis Date     Allergic rhinitis      Depression      Lumbago      Migraine      Other chronic pain     low back     Sacroiliitis (H)     low back pain     Trochanteric bursitis     leg length discrrepancy, hip pain     Past Surgical History:   Procedure Laterality Date     ARTHROPLASTY HIP Left 2016     BACK SURGERY      L4-5 decompression     C REPAIR DETACH RETINA,SCLERAL BUCKLE       CATARACT IOL, RT/LT       CHOLECYSTECTOMY N/A 1/28/2020    Procedure: Cholecystectomy;  Surgeon: Yandel Mallory MD;  Location:  OR     ESOPHAGOSCOPY, GASTROSCOPY, DUODENOSCOPY (EGD), COMBINED N/A 3/3/2020    Procedure: ESOPHAGOGASTRODUODENOSCOPY, WITH ENDOSCOPIC US (enoxaparin);  Surgeon: Bakari Plata MD;  Location:  GI     EYE SURGERY       OPEN REDUCTION INTERNAL FIXATION RODDING INTRAMEDULLARY FEMUR Left 12/23/2014    Procedure: OPEN  REDUCTION INTERNAL FIXATION RODDING INTRAMEDULLARY FEMUR;  Surgeon: Arnol Santiago MD;  Location: [facility] OR     ORTHOPEDIC SURGERY       PICC DOUBLE LUMEN PLACEMENT Right 2020    5 fr double lumen 41 cm     REMOVE HARDWARE LOWER EXTREMITY Left 2015    Procedure: REMOVE HARDWARE LOWER EXTREMITY;  Surgeon: Arnol Santiago MD;  Location: [facility] OR     REMOVE HARDWARE RODDING INTRAMEDULLARY FEMUR Left 2015    Procedure: REMOVE HARDWARE RODDING INTRAMEDULLARY FEMUR;  Surgeon: Arnol Santiago MD;  Location: [facility] OR     RESECT BONE LOWER EXTREMITY Left 2014    Procedure: RESECT BONE LOWER EXTREMITY;  Surgeon: Arnol Santiago MD;  Location: [facility] OR     WHIPPLE PROCEDURE N/A 2020    Procedure: Open Whipple procedure and Cholecystectomy;  Surgeon: Yandel Mallory MD;  [facility] OR     Family History   Problem Relation Age of Onset     Heart Failure Mother 77     Anesthesia Reaction No family hx of      Deep Vein Thrombosis No family hx of      Social History     Tobacco Use     Smoking status: Former Smoker     Packs/day: 0.50     Years: 25.00     Pack years: 12.50     Types: Cigarettes     Last attempt to quit: 2020     Years since quittin.1     Smokeless tobacco: Never Used   Substance Use Topics     Alcohol use: No     No current facility-administered medications for this encounter.      Current Outpatient Medications   Medication     amitriptyline (ELAVIL) 25 MG tablet     calcium carb 1250 mg, 500 mg [place],/vitamin D 200 units (OSCAL WITH D) 500-200 MG-UNIT per tablet     citalopram (CELEXA) 20 MG tablet     diclofenac (FLECTOR) 1.3 % patch     fluticasone (FLONASE) 50 MCG/ACT nasal spray     hydrOXYzine (ATARAX) 10 MG tablet     methocarbamol (ROBAXIN) 500 MG tablet     multivitamin, therapeutic with minerals (MULTI-VITAMIN) TABS     oxyCODONE IR (ROXICODONE) 10 MG tablet     pantoprazole (PROTONIX) 40 MG EC tablet     SUMAtriptan (IMITREX) 100 MG tablet     tiZANidine (ZANAFLEX) 4  "MG tablet     Allergies   Allergen Reactions     Gluten Meal Palpitations     Pregabalin      interfered with Cymbalta     Augmentin Rash     Acetaminophen Nausea and Other (See Comments)     Urine retention       Dust Mite Extract      Gabapentin GI Disturbance     dizziness     Methadone Fatigue     Mold      Naprosyn [Naproxen] GI Disturbance     dizziness     Oxycontin [Oxycodone]      Confusion, loopy, oxycodone is tolerated     Oxymetholone      Seasonal Allergies      Topiramate Other (See Comments)     Tongue numbness, taste alteration, irritable      Latex Rash      Review of Systems   Constitutional: Negative for chills and fever.   Eyes: Negative for visual disturbance.   Respiratory: Negative for shortness of breath.    Cardiovascular: Negative for chest pain and palpitations.   Gastrointestinal: Negative for diarrhea, nausea and vomiting.   Genitourinary: Negative for dysuria and hematuria.   Neurological: Positive for dizziness.   All other systems reviewed and are negative.    Physical Exam   BP: 95/58  Pulse: 94  Heart Rate: 99  Temp: 98.3  F (36.8  C)  Resp: 16  Height: 172.7 cm (5' 8\")  Weight: 80.3 kg (177 lb)  SpO2: 99 %  Lying Orthostatic BP: 110/50  Lying Orthostatic Pulse: 94 bpm  Sitting Orthostatic BP: 93/66  Sitting Orthostatic Pulse: 92 bpm  Standing Orthostatic BP: 103/64  Standing Orthostatic Pulse: 95 bpm  Physical Exam  Constitutional:       General: She is not in acute distress.     Appearance: Normal appearance.   HENT:      Head: Normocephalic and atraumatic.      Mouth/Throat:      Mouth: Mucous membranes are moist.   Eyes:      Extraocular Movements: Extraocular movements intact.   Neck:      Musculoskeletal: Normal range of motion.   Cardiovascular:      Rate and Rhythm: Normal rate and regular rhythm.   Pulmonary:      Effort: Pulmonary effort is normal.      Breath sounds: Normal breath sounds.   Abdominal:      Palpations: Abdomen is soft.      Tenderness: There is no " abdominal tenderness.   Musculoskeletal: Normal range of motion.   Skin:     General: Skin is warm.   Neurological:      General: No focal deficit present.      Mental Status: She is alert and oriented to person, place, and time.      Cranial Nerves: No cranial nerve deficit.      Motor: No weakness.      Coordination: Coordination normal.         ED Course     9:05 PM  The patient was seen and examined by Dr. Coronel in Room [facility].    Procedures             EKG Interpretation:      Interpreted by Mynor Coronel MD  Time reviewed: 1914  Symptoms at time of EKG: Dizziness   Rhythm: normal sinus   Rate: 90  Axis: Normal  Ectopy: premature atrial contraction  Conduction: normal  ST Segments/ T Waves: No acute ischemic changes  Q Waves: none  Comparison to prior: No old EKG available    Clinical Impression: NSR with PAC       Results for orders placed or performed during the hospital encounter of 03/10/20   CBC with platelets differential     Status: Abnormal   Result Value Ref Range    WBC 10.6 4.0 - 11.0 10e9/L    RBC Count 4.02 3.8 - 5.2 10e12/L    Hemoglobin 11.2 (L) 11.7 - 15.7 g/dL    Hematocrit 34.9 (L) 35.0 - 47.0 %    MCV 87 78 - 100 fl    MCH 27.9 26.5 - 33.0 pg    MCHC 32.1 31.5 - 36.5 g/dL    RDW 14.7 10.0 - 15.0 %    Platelet Count 450 150 - 450 10e9/L    Diff Method Automated Method     % Neutrophils 61.5 %    % Lymphocytes 27.6 %    % Monocytes 7.6 %    % Eosinophils 2.4 %    % Basophils 0.5 %    % Immature Granulocytes 0.4 %    Nucleated RBCs 0 0 /100    Absolute Neutrophil 6.5 1.6 - 8.3 10e9/L    Absolute Lymphocytes 2.9 0.8 - 5.3 10e9/L    Absolute Monocytes 0.8 0.0 - 1.3 10e9/L    Absolute Eosinophils 0.3 0.0 - 0.7 10e9/L    Absolute Basophils 0.1 0.0 - 0.2 10e9/L    Abs Immature Granulocytes 0.0 0 - 0.4 10e9/L    Absolute Nucleated RBC 0.0    Comprehensive metabolic panel     Status: Abnormal   Result Value Ref Range    Sodium 130 (L) 133 - 144 mmol/L    Potassium 3.9 3.4 - 5.3 mmol/L    Chloride 101 94 -  109 mmol/L    Carbon Dioxide 21 20 - 32 mmol/L    Anion Gap 7 3 - 14 mmol/L    Glucose 115 (H) 70 - 99 mg/dL    Urea Nitrogen 11 7 - 30 mg/dL    Creatinine 1.06 (H) 0.52 - 1.04 mg/dL    GFR Estimate 60 (L) >60 mL/min/[1.73_m2]    GFR Estimate If Black 69 >60 mL/min/[1.73_m2]    Calcium 8.8 8.5 - 10.1 mg/dL    Bilirubin Total 0.2 0.2 - 1.3 mg/dL    Albumin 3.2 (L) 3.4 - 5.0 g/dL    Protein Total 7.8 6.8 - 8.8 g/dL    Alkaline Phosphatase 166 (H) 40 - 150 U/L    ALT 17 0 - 50 U/L    AST 10 0 - 45 U/L   Troponin I     Status: None   Result Value Ref Range    Troponin I ES <0.015 0.000 - 0.045 ug/L   EKG 12-lead, tracing only     Status: None   Result Value Ref Range    Interpretation ECG Click View Image link to view waveform and result      Medications   0.9% sodium chloride BOLUS (0 mLs Intravenous Stopped 3/10/20 2006)   0.9% sodium chloride BOLUS (0 mLs Intravenous Stopped 3/10/20 2223)   oxyCODONE (ROXICODONE) tablet 5 mg (5 mg Oral Given 3/10/20 2220)        Assessments & Plan (with Medical Decision Making)   Pt nonotxic in NAD presents for dizziness and lightheadedness/near syncope. EKG no ischemic changes trop negative. Labs show dehydration slight increase in Cr from .7 to 1. Discussed with pt IVFs. These were started before orthos. Pts BP borderline low at triage but improved with fluids. Orthos negative after fluids and pt had no more symptoms/dizziness/lightheadedness with hydration. She was feeling better, and could walk without symptoms. Discussed oral hydration at home and outpatient follow up to ensure hydration and renal function stable. She denies any infectious symptoms and is afebrile and vitals stable. I do not feel she needs more of a work up from that standpoint and with resolution of symptoms this can be outpatient follow up. Patient agrees, understands signs and symptoms to return. Discharged in stable condition.    I have reviewed the nursing notes. I have reviewed the findings, diagnosis,  plan and need for follow up with the patient.    Discharge Medication List as of 3/10/2020 11:20 PM        Final diagnoses:   Dehydration   I, Ryley Lau, am serving as a trained medical scribe to document services personally performed by Mynor Coronel MD, based on the provider's statements to me.    Mynor GRIGGS MD, was physically present and have reviewed and verified the accuracy of this note documented by Ryley Lau.    [facility], EMERGENCY DEPARTMENT  3/10/2020     Mynor Coronel MD  03/11/20 5825

## 2020-03-13 ENCOUNTER — HOSPITAL ENCOUNTER (OUTPATIENT)
Dept: PET IMAGING | Facility: CLINIC | Age: 55
Discharge: HOME | End: 2020-03-13
Attending: SURGERY | Admitting: SURGERY
Payer: MEDICARE

## 2020-03-13 DIAGNOSIS — C16.2 MALIGNANT NEOPLASM OF BODY OF STOMACH (H): ICD-10-CM

## 2020-03-13 PROCEDURE — A9552 F18 FDG: HCPCS | Performed by: SURGERY

## 2020-03-13 PROCEDURE — 25000128 H RX IP 250 OP 636: Performed by: SURGERY

## 2020-03-13 PROCEDURE — 34300033 ZZH RX 343: Performed by: SURGERY

## 2020-03-13 PROCEDURE — 71260 CT THORAX DX C+: CPT

## 2020-03-13 RX ORDER — IOPAMIDOL 755 MG/ML
20-135 INJECTION, SOLUTION INTRAVASCULAR ONCE
Status: COMPLETED | OUTPATIENT
Start: 2020-03-13 | End: 2020-03-13

## 2020-03-13 RX ADMIN — IOPAMIDOL 108 ML: 755 INJECTION, SOLUTION INTRAVENOUS at 12:54

## 2020-03-13 RX ADMIN — FLUDEOXYGLUCOSE F-18 10.54 MCI.: 500 INJECTION, SOLUTION INTRAVENOUS at 11:51

## 2020-03-18 RX ORDER — DEXTROSE MONOHYDRATE 25 G/50ML
25-50 INJECTION, SOLUTION INTRAVENOUS
Status: CANCELLED | OUTPATIENT
Start: 2020-03-18

## 2020-03-18 RX ORDER — NICOTINE POLACRILEX 4 MG
15-30 LOZENGE BUCCAL
Status: CANCELLED | OUTPATIENT
Start: 2020-03-18

## 2020-03-18 RX ORDER — CLINDAMYCIN PHOSPHATE 900 MG/50ML
900 INJECTION, SOLUTION INTRAVENOUS
Status: CANCELLED | OUTPATIENT
Start: 2020-03-18

## 2020-03-19 ENCOUNTER — OFFICE VISIT (OUTPATIENT)
Dept: ONCOLOGY | Facility: CLINIC | Age: 55
End: 2020-03-19
Attending: INTERNAL MEDICINE
Payer: MEDICARE

## 2020-03-19 VITALS
HEIGHT: 68 IN | OXYGEN SATURATION: 98 % | WEIGHT: 181.2 LBS | DIASTOLIC BLOOD PRESSURE: 75 MMHG | SYSTOLIC BLOOD PRESSURE: 108 MMHG | HEART RATE: 100 BPM | TEMPERATURE: 98.4 F | BODY MASS INDEX: 27.46 KG/M2

## 2020-03-19 DIAGNOSIS — Z80.3 FAMILY HISTORY OF MALIGNANT NEOPLASM OF BREAST: ICD-10-CM

## 2020-03-19 DIAGNOSIS — C16.9 GASTRIC CANCER (H): Primary | ICD-10-CM

## 2020-03-19 DIAGNOSIS — C16.9 MALIGNANT NEOPLASM OF STOMACH, UNSPECIFIED LOCATION (H): ICD-10-CM

## 2020-03-19 DIAGNOSIS — Z80.0 FAMILY HISTORY OF GASTRIC CANCER: ICD-10-CM

## 2020-03-19 DIAGNOSIS — C16.8 MALIGNANT NEOPLASM OF OVERLAPPING SITES OF STOMACH (H): Primary | ICD-10-CM

## 2020-03-19 DIAGNOSIS — C16.9 GASTRIC CANCER (H): ICD-10-CM

## 2020-03-19 LAB — MISCELLANEOUS TEST: NORMAL

## 2020-03-19 PROCEDURE — G0463 HOSPITAL OUTPT CLINIC VISIT: HCPCS | Mod: ZF

## 2020-03-19 PROCEDURE — 99214 OFFICE O/P EST MOD 30 MIN: CPT | Mod: ZP | Performed by: INTERNAL MEDICINE

## 2020-03-19 PROCEDURE — 36415 COLL VENOUS BLD VENIPUNCTURE: CPT

## 2020-03-19 PROCEDURE — 40000114 ZZH STATISTIC NO CHARGE CLINIC VISIT

## 2020-03-19 PROCEDURE — 96040 ZZH GENETIC COUNSELING, EACH 30 MINUTES: CPT | Mod: ZF | Performed by: GENETIC COUNSELOR, MS

## 2020-03-19 RX ORDER — ONDANSETRON 4 MG/1
4 TABLET, ORALLY DISINTEGRATING ORAL EVERY 30 MIN PRN
Status: CANCELLED | OUTPATIENT
Start: 2020-03-19

## 2020-03-19 RX ORDER — LIDOCAINE 40 MG/G
CREAM TOPICAL
Status: CANCELLED | OUTPATIENT
Start: 2020-03-19

## 2020-03-19 RX ORDER — SODIUM CHLORIDE, SODIUM LACTATE, POTASSIUM CHLORIDE, CALCIUM CHLORIDE 600; 310; 30; 20 MG/100ML; MG/100ML; MG/100ML; MG/100ML
INJECTION, SOLUTION INTRAVENOUS CONTINUOUS
Status: CANCELLED | OUTPATIENT
Start: 2020-03-19

## 2020-03-19 RX ORDER — NALOXONE HYDROCHLORIDE 0.4 MG/ML
.1-.4 INJECTION, SOLUTION INTRAMUSCULAR; INTRAVENOUS; SUBCUTANEOUS
Status: CANCELLED | OUTPATIENT
Start: 2020-03-19 | End: 2020-03-20

## 2020-03-19 RX ORDER — FENTANYL CITRATE 50 UG/ML
25-50 INJECTION, SOLUTION INTRAMUSCULAR; INTRAVENOUS
Status: CANCELLED | OUTPATIENT
Start: 2020-03-19

## 2020-03-19 RX ORDER — ONDANSETRON 2 MG/ML
4 INJECTION INTRAMUSCULAR; INTRAVENOUS EVERY 30 MIN PRN
Status: CANCELLED | OUTPATIENT
Start: 2020-03-19

## 2020-03-19 ASSESSMENT — PAIN SCALES - GENERAL: PAINLEVEL: NO PAIN (0)

## 2020-03-19 ASSESSMENT — MIFFLIN-ST. JEOR: SCORE: 1475.42

## 2020-03-19 NOTE — PATIENT INSTRUCTIONS
Assessing Cancer Risk  Only about 5-10% of cancers are thought to be due to an inherited cancer susceptibility gene.    These families often have:    Several people with the same or related types of cancer    Cancers diagnosed at a young age (before age 50)    Individuals with more than one primary cancer    Multiple generations of the family affected with cancer    Hereditary Diffuse Gastric Cancer (HDGC)  We each inherit two copies of every gene in our bodies: one from our mother and one from our father.  Each gene has a specific job to do.  When a gene has a mistake or  mutation  in it, it does not work like it should. Currently, one gene is known to cause hereditary diffuse gastric cancer: CDH1.  Of people diagnosed with HDGC, 30-50% have a mutation in the CDH1 gene.  This suggests there are likely other genes that may cause HDGC that have not been identified yet.  Individuals with HDGC are at increased risk for diffuse gastric cancer and lobular breast cancer. The table below lists the chance that someone with a CDH1 mutation would develop cancer in his or her lifetime3.      Lifetime Cancer Risks    General Population HDGC    Diffuse Gastric  <1% 67-83%   Breast 12% 39-52%     Inheriting a mutation does not mean a person will develop cancer, but it does significantly increase his or her risk above the general population risk.    Inheritance   CDH1 mutations are inherited in an autosomal dominant pattern.  This means that if a parent has a mutation, each of his or her children will have a 50% chance of inheriting that same mutation.  Therefore, each child--male or female--would have a 50% chance of being at increased risk for developing cancer.          Image obtained from [employer], 2013     Genetic Testing  Genetic testing involves a blood test and will look at the genetic information in the CDH1 for any harmful mutations that are associated with increased cancer risk.  If possible, it is  recommended that the person(s) who has had cancer be tested first before other family members.  That person will give us the most useful information about whether or not a specific gene is associated with the cancer in the family.    Results  There are three possible results of CDH1 genetic testing:    Positive--a harmful mutation was identified     Negative--no mutation was identified     Variant of unknown significance--a variation in the CDH1 gene was identified, but it is unclear how this impacts cancer risk in the family    Advantages and Disadvantages  There are advantages and disadvantages to genetic testing of CDH1.    Advantages    May clarify your cancer risk    Can help you make medical decisions    May explain the cancers in your family    May give useful information to your family members (if you share your results)    Disadvantages    Possible negative emotional impact of learning about inherited cancer risk    Uncertainty in interpreting a negative test result in some situations    Possible genetic discrimination concerns (see below)    Genetic Information Nondiscrimination Act (ANICETO)  ANICETO is a federal law that protects individuals from health insurance or employment discrimination based on a genetic test result alone.  Although rare, there are currently no legal protections in terms of life insurance, long term care, or disability insurances.  Visit the National Human Genome Research [place] genome.gov/91304924 to learn more.    Reducing Cancer Risk  Current screening recommendations for people with hereditary diffuse gastric cancer include1,2,3:    Stomach:   o H. Pylori test at diagnosis; treat if positive1  o Upper endoscopy (EGD) with multiple random biopsies at diagnosis; repeated every 6-12 months  o Recommend preventative gastrectomy between age 18-40.  Upper endoscopy with multiple random biopsies should be done prior to surgery.     Breast (same as BRCA carriers):  o Breast self-exams  starting at age 18; monthly  o Clinical breast exams starting at age 25; repeated every 6-12 months  o Annual breast MRI starting at age 25   o Annual mammogram and breast MRI at age 30 (for women with and without a breast cancer history)  o Consideration of preventative mastectomy    Colorectal: Colonoscopy beginning at age 40; repeated every 3-5 years1    Questions to Think About Regarding Genetic Testing    What effect will the test result have on me and my relationship with my family members if I have an inherited gene mutation?  If I don t have a gene mutation?    Should I share my test results, and how will my family react to this news, which may also affect them?    Are my children ready to learn new information that may one day affect their own health?    Resources  No stomach for cancer, Inc. [email or web address]   Stomach cancer relief network scrnet.org   American Cancer Society (ACS) cancer.org   National Cancer [place] (NCI) cancer.gov     Please call us if you have any questions or concerns.   Cancer Risk Management Program [phone] ([phone])  ? Desmond Badillo, MS, [facility] [phone]  ? Jennifer Gee, MS, [facility]  [phone]  ? Tiny Smith, MS, [facility]  [phone]  ? Adrienne Grant, MS, [facility] [phone]  ? Mckenna Zelaya, MS, [facility] [phone]  ? Tiffani Jones, MS, [facility]  [phone]  ? Rosamaria Hickman, MS, [facility]  [phone]      References  1. Kulwinder RC, Herman R, Beny D, Demetrius F, Mayda P, Russell V, Eric DC, Porfirio J, Megan K, Van Maricruz JH, Mendoza S, Topher C; International Gastric Cancer Linkage Consortium. Hereditary diffuse gastric cancer: updated consensus guidelines for clinical management and directions for future research. 2010.   2. National Comprehensive Cancer Network. Clinical practice guidelines in oncology, Gastric Cancer. Available online (registration required). 2015.  3. National Comprehensive Cancer Network. Clinical practice guidelines in oncology,  genetic/familial high-risk assessment: breast and ovarian. Available online (registration required). 2015.    Assessing Cancer Risk  Only about 5-10% of cancers are thought to be due to an inherited cancer susceptibility gene.    These families often have:  ? Several people with the same or related types of cancer  ? Cancers diagnosed at a young age (before age 50)  ? Individuals with more than one primary cancer  ? Multiple generations of the family affected with cancer    Comprehensive Colon Cancer Panel  We each inherit two copies of every gene in our bodies: one from our mother, and one from our father.  Each gene has a specific job to do.  When a gene has a mistake or  mutation  in it, it does not work like it should.      This handout will review common hereditary colon cancer syndromes, and other genes related to an increased risk for colon cancer.  The genes that will be discussed in this handout are: APC, BMPR1A, CDH1, CHEK2, EPCAM, GREM1, MLH1, MSH2, MSH6, MUTYH, PMS2, POLD1, POLE, PTEN, SMAD4, STK11, and TP53.  These genes are clinically actionable, meaning there are published guidelines for cancer screening and management for individuals who are found to carry mutations in these genes. Inheriting a mutation does not mean a person will develop cancer, but it does significantly increase his or her risk above the general population risk.      Familial Adenomatous Polyposis (FAP)  FAP is a hereditary cancer syndrome caused by mutations in the APC gene. The condition is known to cause hundreds to thousands of adenomatous polyps in the colon, creating a  carpet  of polyps. Some individuals have what is called attenuated FAP (AFAP), a milder form of FAP with fewer polyps and typically later onset. Individuals with an APC gene mutation are at an increased risk for colon, thyroid, and duodenal cancers, as well as several other types of cancer1.  Other features of this condition may include: osteomas, dental  anomalies, benign skin lesions, CHRPE ( freckle  on the inside of the eye), and desmoid tumors.      Lifetime Cancer Risks     Cancer Type General Population FAP   Colon  5% near 100%   Thyroid (papillary) 1% 1-12%   Duodenal <1% 5%   Liver  <1% 1-2% before age 5   Pancreas <1% 1%   Stomach <1% 1%       Juvenile Polyposis Syndrome (JPS)  JPS is characterized by hamartomatous polyps, called juvenile polyps, in the gastrointestinal tract.  Juvenile  refers to the type of polyps seen in this hereditary cancer condition, not the age of onset. Currently, mutations in two genes are known to cause JPS: BMPR1A and SMAD4. Of individuals clinically diagnosed with JPS, only 40% have an identifiable mutation in one of these genes, suggesting there are other genes that cause JPS that have not been discovered yet. Individuals with JPS are at an increased risk for colon cancer and stomach cancer 2,3,4. Pancreatic and small bowel cancers have also been reported in JPS, but the actual risks are unknown.         Lifetime Cancer Risks    Cancer Type General Population JPS   Colon 5% 40-50%   Gastric/Duodenal <1% 10-21%     Some individuals with SMAD4 mutations have a condition called JPS/HHT (Juvenile Polyposis/Hereditary Hemorrhagic Telangiectasia) where in addition to JPS, individuals may have nose bleeds and clotting issues.     Hereditary Diffuse Gastric Cancer (HDGC)  Currently, mutations in one gene are known to cause Hereditary Diffuse Gastric Cancer: CDH1.  Individuals with HDGC are at increased risk for diffuse gastric cancer and lobular breast cancer. Of people diagnosed with HDGC, 30-50% have a mutation in the CDH1 gene.  This suggests there are likely mutations in other genes that may cause HDGC that have not been identified yet. Individuals with HDGC may also be at increased risk for colon cancer.      Lifetime Cancer Risks    Cancer Type General Population HDGC   Diffuse Gastric <1% 67-83%   Breast 12% 39-52%     Elizabeth  syndrome  Mutations in five different genes are known to cause Johnson syndrome: MLH1, MSH2, MSH6, PMS2, and EPCAM. Individuals with Johnson syndrome have an increased risk for colon, uterine, ovarian, small bowel, stomach, urinary tract, and brain cancer, as well as several other types of cancer. The exact lifetime cancer risks are dependent upon the gene in which the mutation was identified.      Lifetime Cancer Risks    Cancer Type General Population Johnson syndrome   Colon 5% 12-52%   Uterine 2-3% Up to 57%   Stomach <1% Up to 16%   Ovarian 2% Up to 38%   Urinary tract <1% 1-7%   Hepatobiliary tract <1% 1-4%   Small bowel <1% Up to 11%   Brain/CNS <1% Not well established   Pancreas <1% Up to 6%       MUTYH-Associated Polyposis (MAP)  MAP is a hereditary cancer syndrome caused by mutations in the MUTYH gene. Unlike the other hereditary cancer genes discussed in this handout, two mutations in the MUTYH gene cause MAP and increase cancer risk. Those affected with MAP typically have between  adenomatous polyps. This syndrome also increases the risk for colon and duodenal cancer. Current research suggests that other cancers may be associated with MUTYH mutations, as well. The table below includes the risk that someone with two MUTYH gene mutations would develop cancer in their lifetime; of note, there is also an increased colon cancer risk for individuals who carry only one MUTYH gene mutation5,6,7.       Lifetime Cancer Risks    Cancer Type General Population MAP   Colon 5% %   Duodenal  <1% 5%       Cowden syndrome  Cowden syndrome is a hereditary condition that increases the risk for breast, thyroid, endometrial, colon, and kidney cancer.  A single mutation in the PTEN gene causes Cowden syndrome and increases cancer risk.  The table below shows the chance that someone with a PTEN mutation would develop cancer in their lifetime8,9.  Other benign features seen in some individuals with Cowden syndrome  include benign skin lesions (facial papules, keratoses, lipomas), learning disabilities, autism, thyroid nodules, hamartomatous colon polyps, and larger head size.      Lifetime Cancer Risks   Cancer Type General Population Cowden Syndrome   Breast 12% 25-50%*   Thyroid 1% 35%   Renal 1-2% 35%   Endometrial 2-3% 28%   Colon 5% 9%   Melanoma 2-3% >5%     *One recent study found breast cancer risk to be increased to 85%    Peutz-Jeghers syndrome (PJS)  PJS is a hereditary cancer syndrome caused by mutations in the STK11 gene. This condition can be distinguished from other hereditary syndromes by the presence of hamartomatous polyps in the gastrointestinal tract and freckles present in unusual places such as the hands, feet, neck, and lips. Individuals with Peutz-Jeghers syndrome have an increased risk for colon, breast, pancreatic, and other cancers3.  Men are at risk for testicular tumors which can affect hormones in the body. Women are at risk for sex cord tumors of the ovaries and a rare aggressive type of cervical cancer.     Lifetime Cancer Risks    Cancer Type General Population PJS   Breast 12% 45-50%   Colon 5% 39%   Stomach <1% 29%   Pancreas 1.5% 11-36%   Small Intestine <1% 13%     Ovarian  2%  18%   Lung 6-7% 15-17%     Additional Genes Associated with Increased Colon Cancer Risk  CHEK2  CHEK2 is a moderate-risk breast cancer gene.  Women who have a mutation in CHEK2 have around a 2-fold increased risk for breast cancer compared to the general population, and this risk may be higher depending upon family history.12,13,14.  Mutations in CHEK2 have also been shown to increase the risk of a number of other cancers, including colon and prostate, however these cancer risks are currently not well understood.     GREM1  GREM1 is a moderate-risk colon polyposis gene. Duplications of this gene are more commonly found in individuals with Ashkenazi Taoist [id number]. Mutations in GREM1 are associated with colon  polyps and therefore an increased risk of colon cancer; however the estimated cancer risk is not well [id number].     POLD1 and POLE  POLD1 and POLE are moderate-risk colon cancer genes. Carriers of a mutation in one of these genes increases the lifetime risk of colorectal dnifiy21,18,19,20. Mutations in these genes may also be associated with increased risk for other cancers including: endometrial cancer, duodenal adenomas and carcinomas, and brain tumors.    TP53  Li Fraumeni syndrome (LFS) is a hereditary cancer predisposition syndrome. LFS is caused by a mutation in the TP53 gene. A single mutation in one of the copies of TP53 increases the risk for multiple cancers. Individuals with LFS are at an increased risk for developing cancer at a young age. The general lifetime risk for development of cancer is 50% by age 30 and 90% by age 60.      Core Cancers: Sarcomas, Breast, Brain, Lung, Leukemias/Lymphomas, Adrenocortical carcinomas  Other Cancers: Gastrointestinal, Thyroid, Skin, Genitourinary    Genetic Testing  Genetic testing involves a simple blood test and will look at the genetic information in genes associated with an increased risk of colon cancer. The tests look for any harmful mutations that are associated with increased cancer risk.  If possible, it is recommended that the person(s) who has had cancer be tested before other family members.  That person will give us the most useful information about whether or not a specific gene mutation is associated with the cancer in the family.     Results  There are three possible results from genetic testing:  ? Positive--a harmful mutation was identified  ? Negative--no mutation was identified  ? Variant of unknown significance--a variation in one of the genes was identified, but it is unclear how this impacts cancer risk in the family  Advantages and Disadvantages  There are advantages and disadvantages to genetic testing of these genes.    Advantages  ? May  clarify your cancer risk  ? Can help you make medical decisions  ? May explain the cancers in your family  ? May give useful information to your family members (if you share your results)    Disadvantages  ? Possible negative emotional impact of learning about inherited cancer risk  ? Uncertainty in interpreting a negative test result in some situations  ? Possible genetic discrimination concerns (see below)    Inheritance   Most mutations in the genes outlined above are inherited in an autosomal dominant pattern.  This means that if a parent has a mutation, each of his or her children will have a 50% chance of inheriting that same mutation.  Therefore, each child--male or female--would have a 50% chance of being at increased risk for developing cancer.                                            Image obtained from [employer], 2013     In the case of MUTYH-Associated Polyposis (MAP) this hereditary cancer syndrome is inherited in an autosomal recessive pattern. This means that each parent of an individual with MAP is a carrier of MAP, meaning that they have only one mutation in MUTYH. They still have one functioning copy of their gene.  Carriers are at a slightly higher risk for colon cancer than the general population. If each parent is a carrier for MAP, they have a 25% of having a child who is affected with MAP, meaning the child inherited both gene mutations - one from each parent.       Image obtained from [employer], 2016    Genetic Information Nondiscrimination Act (ANICETO)  ANICETO is a federal law that protects individuals from health insurance or employment discrimination based on a genetic test result alone.  Although rare, there are currently no legal protections in terms of life insurance, long term care, or disability insurances.  Visit the National Human Genome Research [place] at Genome.gov/49915992 to learn more.    Reducing Cancer Risk  Each of the genes listed within this  handout have nationally recognized cancer screening guidelines that would be recommended for individuals who test positive.  In addition to increased cancer screening, surgeries may be offered or recommended to reduce cancer risk in certain cases.  Recommendations are based upon an individual s genetic test result as well as their personal and family history of cancer.    Questions to Think About Regarding Genetic Testing  ? What effect will the test result have on me and my relationship with my family members if I have an inherited gene mutation?  If I don t have a gene mutation?  ? Should I share my test results, and how will my family react to this news, which may also affect them?  ? Are my children ready to learn new information that may one day affect their own health?    Resources    PTEN World [email or web address]   No Stomach for Cancer, Inc. [email or web address]   Stomach Cancer Relief Network scrnet.org   Collaborative Group of the Americas on Inherited Colorectal Cancer (CGA) cgaicc.com   Cancer Care cancercare.[email or web address]   American Cancer Society (ACS) cancer.org   National Cancer [place] (NCI) cancer.org   Johnson Syndrome International [email or web address]       Please call us if you have any questions or concerns.     Cancer Risk Management Program [phone]-CANCER ([phone])  ? Desmond Badillo, MS, [facility] [phone]  ? Jennifer Gee, MS, [facility]  [phone]  ? Tiny Smith, MS, [facility]  [phone]  ? Tiffani Jones, MS, [facility]  [phone]  ? Adrienne Grant, MS, [facility] [phone]  ? Mckenna Zelaya, MS, [facility] [phone]  ? Rosamaria Hickman, MS, [facility]  [phone]    References    4. Anupam SCHMIDT, Hermes J, Cruz G, Laron-Yakelin E, Christiane J, et al. The Prevalence of thyroid cancer and benign thyroid disease in patients with familial adenomatous polyposis may be higher than previously recognized. Clin Colorectal Cancer. 2012;11:304-308.  5. Wendy LEI, Papo Nunn A, Amrita L, Wade OGDEN, Johanna DUNCAN, et al. Risk of colorectal cancer in  juvenile polyposis. Gut. 2007;56:965-967.  6. Oziel FG, Noe MN, Hollis CA. Colorectal cancer risk in hamartomatous polyposis syndromes. World Journal of Gastrointestinal Surgery. 2015;27:25-32  7. Mahesh CAMARILLO. Guidance on gastrointestinal surveillance for hereditary non-polyposis colorectal cancer, familial adenomatous polypolis, juvenile polyposis, and Peutz-Jeghers syndrome. Gut. 2002;51:21-27.  8. Juan AK, Werner PRAFUL, Carlos JG et al. Risk of extracolonic cancers for people with biallelic and monoallelic mutations in MUTYH. Int J of Cancer. 2016;139:6195-0346.  9. Summer S, Christopher S, Govaldemar H, Ally K, Conroy M, et al. MUTYH-associated polyposis: 70 of 71 patients with biallelic mutations present with an attenuated or atypical phenotype. Int J of Cancer. 2006;119:807-814.  10. Gerald G, Ophelia F, Liban I, Edgar M, Gerald H, et al. MUTYH mutation carriers have increased breast cancer risk. Cancer. 2012;5652-8192.  11. Andrade MH, Shayna J, Tia J, Ryyareli LA, Oralex MS, Eng C. Lifetime cancer risks in individuals with germline PTEN mutations. Clin Cancer Res. 2012;18:400-7.  12. Parul R. Cowden Syndrome: A Critical Review of the Clinical Literature. J Sara . 2009:18:13-27.  13. National Comprehensive Cancer Network. Clinical practice guidelines in oncology, colorectal cancer screening. Available online (registration required). 2013.  14. National Cancer [place]. SEER Cancer Stat Fact Sheets.  December 2013.  15. CHEK2 Breast Cancer Case-Control Consortium. CHEK2*1100delC and susceptibility to breast cancer: A collaborative analysis involving 10,860 breast cancer cases and 9,065 controls from 10 studies. Am J Hum Sara, 74 (2004), pp. 8861-1588  16. Neisha T, Anderson S, Bradley K, et al. Spectrum of Mutations in BRCA1, BRCA2, CHEK2, and TP53 in Families at High Risk of Breast Cancer. ARON. 2006;295(12):3337-3639.  17. Marco OGDEN, Celsa TOLEDO, Michael ALANIZ, et al. Risk of breast cancer in  women with a CHEK2 mutation with and without a family history of breast cancer. J Clin Oncol. 2011;29:5420-4983.  18. Noe ZIMMERMAN, Del E, Landry J, Татьяна N, Manuela PEREZ et al. Defining the polyposis/colorectal cancer phenotype associated with the GREM1 duplication: counseling and management guidelines. Sara .Res. 2016;98:1-5.  19. Dee Dee SCHMIDT, Keara S, Jonathan ALANIZ, Srikanth Menard, et al. Hereditary mixed polyposis syndrome is caused by a 40kb upstream duplication that leads to increased and ectopic expression of the BMP antagonist GREM1. Sharona Sara. 2015;44:699-703.  20. MELVI Abarca. et al. Germline mutations affecting the proofreading domains of POLE and POLD1 predispose to colorectal adenomas and carcinomas. Sharona. Sara. 45, 136-44 (2013).  21. JIE Srinivasan. et al. POLE and POLD1 mutations in 529 henrry with familial colorectal cancer and/or polyposis: review of reported cases and recommendations for genetic testing and surveillance. Sara. Med. (2015). doi:10.1038/gim.2015.75  22. QUIQUE Ness. et al. New insights into POLE and POLD1 germline mutations in familial colorectal cancer and polyposis. Hum. Mol. Sara. 23, 9549-80 (2014).  23. CATHIE Mae et al. Frequency and phenotypic spectrum of germline mutations in POLE and seven other polymerase genes in 266 patients with colorectal adenomas and carcinomas. Int. J. Cancer 137, 320-31 (2015).

## 2020-03-19 NOTE — PROGRESS NOTES
3/19/2020    Referring Provider: Magaly Pollard MD    Presenting Information:   I met with Nathalie Bashir today for genetic counseling at the Cancer Risk Management Program at the [facility] to discuss her personal and family history of cancer.  She is here today to review this history, cancer screening recommendations, and available genetic testing options.    Personal History:  Nathalie is a 54 year old female. 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 She is meeting with Dr. Pollard today to discuss further treatment.      She had her first menstrual period at age 14, her first child at age 20, and is postmenopausal (age 52). Nathalie has her ovaries, fallopian tubes and uterus in place. She reports that she has not used hormone replacement therapy or infertility medications. She has used the depo shot in the past. She has clinical breast exams and mammograms; her most recent mammogram on 1/22/15 was negative. Her most recent colonoscopy on 7/20/18 detected no polyps and follow-up was recommended in ten years. Nathalie reported former tobacco use off and on for 15 years. She states that she has now quit. She reports no alcohol use.    Family History: (Please see scanned pedigree for detailed family history information)  Maternal:  Her mother passed away at age 76 with no known history of cancer.   Her maternal uncle is 70 years old and has had multiple skin cancers in the last 8 years (face, leg) that have been removed. These were possibly melanomas.   Her maternal grandmother was diagnosed with skin cancers at age 89 or 90 (toe, face) that were removed. These may have been melanomas. She was then diagnosed with uterine cancer at age 98. She passed away at age 98.   Her maternal grandfather had a history of skin cancer (possibly  melanoma) of his face. He passed away at age 96.  Paternal:  Her father passed away at age 70 with no known history of cancer.   Her paternal uncle is 75 years old and was diagnosed with stomach cancer at an unknown age (she believes that this was a while ago). She does not know what type of stomach cancer he had.    His daughter (Nathalie's cousin) is 60 years old and was diagnosed with breast cancer (unknown type) at age 59. She had chemotherapy and radiation.     Her maternal ethnicity is [place] and [place]. Her paternal ethnicity is [place]. There is no known Ashkenazi Adventism ancestry on either side of her family.     Discussion:    Nathalie's personal and family history of cancer is suggestive of a hereditary cancer syndrome.    We reviewed the features of sporadic, familial, and hereditary cancers. In looking at Nathalie's family history, it is possible that a cancer susceptibility gene is present due to her recent diagnosis of diffuse gastric cancer and her family history of stomach cancer in her paternal uncle and breast cancer in her paternal cousin.    Based on her diagnosis of diffuse gastric cancer, we discussed the CDH1 gene. Mutations in the CDH1 gene cause Hereditary Diffuse Gastric Cancer (HDGC). HDGC is associated with increased risks for diffuse gastric cancer and lobular breast cancer. Diffuse gastric cancer infiltrates the stomach wall without forming a distinct mass. Women with HDGC are at increased risk for lobular breast cancer.     A detailed handout regarding the CDH1 gene and other genes associated with gastrointestinal cancers was provided to Nathalie at the end of our appointment today and can be found in the after visit summary. Topics included: inheritance pattern, cancer risks, cancer screening recommendations, and also risks, benefits and limitations of testing.    Based on her personal and family history, we discussed the option of genetic testing of the CDH1 gene.       We discussed  that there are additional genes that could cause increased risk for stomach cancer. As many of these genes present with overlapping features in a family and accurate cancer risk cannot always be established based upon the pedigree analysis alone, it would be reasonable for Nathalie to consider panel genetic testing to analyze multiple genes at once.  We reviewed genetic testing options for the cancers seen in Nathalie s family history that suggest a hereditary cause: a more focused custom panel of genes associated with stomach cancer or expanded custom panel options to also include genes associated with other types of cancer. Nathalie expressed an interest in more broad testing. She opted for an expanded CustomNext-Cancer panel (a combination of the CancerNext panel + the CTNNA1 gene + additional genes associated with colon polyps and melanoma).  Genetic testing is available for 34 genes associated with hereditary cancer: CancerNext (APC, ILIA, BARD1, BMPR1A, BRCA1, BRCA2, BRIP1, CDH1, CDK4, CDKN2A, CHEK2, DICER1, EPCAM, GREM1, HOXB13, MLH1, MRE11A, MSH2, MSH6, MUTYH, NBN, NF1, PALB2, PMS2, POLD1, POLE, PTEN, RAD50, RAD51C, RAD51D, SMAD4, SMARCA4, STK11, and TP53).  We discussed that many of the genes in the CancerNext panel are associated with specific hereditary cancer syndromes and published management guidelines: Hereditary Breast and Ovarian Cancer syndrome (BRCA1, BRCA2), Johnson syndrome (MLH1, MSH2, MSH6, PMS2, EPCAM), Familial Adenomatous Polyposis (APC), Hereditary Diffuse Gastric Cancer (CDH1), Familial Atypical Multiple Mole Melanoma syndrome (CDK4, CDKN2A), Juvenile Polyposis syndrome (BMPR1A, SMAD4), Cowden syndrome (PTEN), Li Fraumeni syndrome (TP53), Peutz-Jeghers syndrome (STK11), MUTYH Associated Polyposis (MUTYH), and Neurofibromatosis type 1 (NF1).   The ILIA, BRIP1, CHEK2, GREM1, NBN, PALB2, POLD1, POLE, RAD51C, and RAD51D genes are associated with increased cancer risk and have published management  guidelines for certain cancers.    The remaining genes (BARD1, DICER1, HOXB13, MRE11A, RAD50, and SMARCA4) are associated with increased cancer risk and may allow us to make medical recommendations when mutations are identified.    Nathalie was provided with a detailed brochure from [employer] explaining the CancerNext testing.   As described above, her testing will also include the CTNNA1 gene, which has a preliminary association with diffuse gastric cancer, as well as additional genes associated with colon polyps and genes associated with melanoma.  Consent was obtained and genetic testing for a CustomNext-Cancer panel (a combination of the CancerNext panel + the CTNNA1 gene + additional genes associated with colon polyps and melanoma) was sent to [facility]. Turn around time: 3-4 weeks.    Medical Management: For Nathalie, we reviewed that the information from genetic testing may determine:    additional cancer screening for which Nathalie may qualify (i.e. mammogram and breast MRI, more frequent colonoscopies, more frequent dermatologic exams, etc.),    options for risk reducing surgeries Nathalie could consider (i.e. bilateral mastectomy, surgery to remove her ovaries and/or uterus, etc.),      and targeted chemotherapies for Nathalie's active cancer, or if she were to develop certain cancers in the future (i.e. immunotherapy for individuals with Johnson syndrome, PARP inhibitors, etc.).     These recommendations will be discussed in detail once genetic testing is completed.     Plan:  1) Today Nathalie elected to proceed with genetic testing via a CustomNext-Cancer panel (a combination of the CancerNext panel + the CTNNA1 gene + additional genes associated with colon polyps and melanoma) offered by [employer].  2) This information should be available in 3-4 weeks.  3) Nathalie will receive a phone call to discuss the results.    Face to face time: 60 minutes    Rosamaria Hickman MS, [facility]  Licensed  Genetic Counselor  Office: [phone]

## 2020-03-19 NOTE — PATIENT INSTRUCTIONS
Proceed with port and chemo as scheduled    2 weeks after start of chemo, RTC to see Roxana and next chemo

## 2020-03-19 NOTE — LETTER
Cancer Risk Management  Program Locations    [facility]  [facility]  Mailing Address  Cancer Risk Management Program  [facility]  [address]  [place] [address]    New patient appointments  [phone]  March 26, 2020    Nathalie Bashir  [address] [address]      Dear Nathalie,    It was a pleasure meeting with you at the Cancer Risk Management Program at the [facility] on 3/19/2020. Here is a copy of the progress note from your recent genetic counseling visit to the Cancer Risk Management Program. If you have any additional questions, please feel free to call.    Referring Provider: Magaly Pollard MD    Presenting Information:   I met with Nathalie Bashir today for genetic counseling at the Cancer Risk Management Program at the [facility] to discuss her personal and family history of cancer.  She is here today to review this history, cancer screening recommendations, and available genetic testing options.    Personal History:  Nathalie is a 54 year old female. 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 She is meeting with Dr. Pollard today to discuss further treatment.      She had her first menstrual period at age 14, her first child at age 20, and is postmenopausal (age 52). Nathalie has her ovaries, fallopian tubes and uterus in place. She reports that she has not used hormone replacement therapy or infertility medications. She has used the depo shot in the past. She has clinical breast exams and mammograms; her most recent mammogram on 1/22/15 was negative. Her most recent colonoscopy on 7/20/18 detected  no polyps and follow-up was recommended in ten years. Nathalie reported former tobacco use off and on for 15 years. She states that she has now quit. She reports no alcohol use.    Family History: (Please see scanned pedigree for detailed family history information)  Maternal:  Her mother passed away at age 76 with no known history of cancer.   Her maternal uncle is 70 years old and has had multiple skin cancers in the last 8 years (face, leg) that have been removed. These were possibly melanomas.   Her maternal grandmother was diagnosed with skin cancers at age 89 or 90 (toe, face) that were removed. These may have been melanomas. She was then diagnosed with uterine cancer at age 98. She passed away at age 98.   Her maternal grandfather had a history of skin cancer (possibly melanoma) of his face. He passed away at age 96.  Paternal:  Her father passed away at age 70 with no known history of cancer.   Her paternal uncle is 75 years old and was diagnosed with stomach cancer at an unknown age (she believes that this was a while ago). She does not know what type of stomach cancer he had.    His daughter (Nathalie's cousin) is 60 years old and was diagnosed with breast cancer (unknown type) at age 59. She had chemotherapy and radiation.     Her maternal ethnicity is [place] and [place]. Her paternal ethnicity is [place]. There is no known Ashkenazi Religion ancestry on either side of her family.     Discussion:    Nathalie's personal and family history of cancer is suggestive of a hereditary cancer syndrome.    We reviewed the features of sporadic, familial, and hereditary cancers. In looking at Nathalie's family history, it is possible that a cancer susceptibility gene is present due to her recent diagnosis of diffuse gastric cancer and her family history of stomach cancer in her paternal uncle and breast cancer in her paternal cousin.    Based on her diagnosis of diffuse gastric cancer, we discussed the CDH1 gene.  Mutations in the CDH1 gene cause Hereditary Diffuse Gastric Cancer (HDGC). HDGC is associated with increased risks for diffuse gastric cancer and lobular breast cancer. Diffuse gastric cancer infiltrates the stomach wall without forming a distinct mass. Women with HDGC are at increased risk for lobular breast cancer.     A detailed handout regarding the CDH1 gene and other genes associated with gastrointestinal cancers was provided to Nathalie at the end of our appointment today and can be found in the after visit summary. Topics included: inheritance pattern, cancer risks, cancer screening recommendations, and also risks, benefits and limitations of testing.    Based on her personal and family history, we discussed the option of genetic testing of the CDH1 gene.       We discussed that there are additional genes that could cause increased risk for stomach cancer. As many of these genes present with overlapping features in a family and accurate cancer risk cannot always be established based upon the pedigree analysis alone, it would be reasonable for Nathalie to consider panel genetic testing to analyze multiple genes at once.  We reviewed genetic testing options for the cancers seen in Nathalie s family history that suggest a hereditary cause: a more focused custom panel of genes associated with stomach cancer or expanded custom panel options to also include genes associated with other types of cancer. Nathalie expressed an interest in more broad testing. She opted for an expanded CustomNext-Cancer panel (a combination of the CancerNext panel + the CTNNA1 gene + additional genes associated with colon polyps and melanoma).  Genetic testing is available for 34 genes associated with hereditary cancer: CancerNext (APC, ILIA, BARD1, BMPR1A, BRCA1, BRCA2, BRIP1, CDH1, CDK4, CDKN2A, CHEK2, DICER1, EPCAM, GREM1, HOXB13, MLH1, MRE11A, MSH2, MSH6, MUTYH, NBN, NF1, PALB2, PMS2, POLD1, POLE, PTEN, RAD50, RAD51C, RAD51D, SMAD4,  SMARCA4, STK11, and TP53).  We discussed that many of the genes in the CancerNext panel are associated with specific hereditary cancer syndromes and published management guidelines: Hereditary Breast and Ovarian Cancer syndrome (BRCA1, BRCA2), Johnson syndrome (MLH1, MSH2, MSH6, PMS2, EPCAM), Familial Adenomatous Polyposis (APC), Hereditary Diffuse Gastric Cancer (CDH1), Familial Atypical Multiple Mole Melanoma syndrome (CDK4, CDKN2A), Juvenile Polyposis syndrome (BMPR1A, SMAD4), Cowden syndrome (PTEN), Li Fraumeni syndrome (TP53), Peutz-Jeghers syndrome (STK11), MUTYH Associated Polyposis (MUTYH), and Neurofibromatosis type 1 (NF1).   The ILIA, BRIP1, CHEK2, GREM1, NBN, PALB2, POLD1, POLE, RAD51C, and RAD51D genes are associated with increased cancer risk and have published management guidelines for certain cancers.    The remaining genes (BARD1, DICER1, HOXB13, MRE11A, RAD50, and SMARCA4) are associated with increased cancer risk and may allow us to make medical recommendations when mutations are identified.    Nathalie was provided with a detailed brochure from [employer] explaining the CancerNext testing.   As described above, her testing will also include the CTNNA1 gene, which has a preliminary association with diffuse gastric cancer, as well as additional genes associated with colon polyps and genes associated with melanoma.  Consent was obtained and genetic testing for a CustomNext-Cancer panel (a combination of the CancerNext panel + the CTNNA1 gene + additional genes associated with colon polyps and melanoma) was sent to [facility]. Turn around time: 3-4 weeks.    Medical Management: For Nathalie, we reviewed that the information from genetic testing may determine:    additional cancer screening for which Nathalie may qualify (i.e. mammogram and breast MRI, more frequent colonoscopies, more frequent dermatologic exams, etc.),    options for risk reducing surgeries Nathalie could consider (i.e.  bilateral mastectomy, surgery to remove her ovaries and/or uterus, etc.),      and targeted chemotherapies for Nathalie's active cancer, or if she were to develop certain cancers in the future (i.e. immunotherapy for individuals with Johnson syndrome, PARP inhibitors, etc.).     These recommendations will be discussed in detail once genetic testing is completed.     Plan:  1) Today Nathalie elected to proceed with genetic testing via a CustomNext-Cancer panel (a combination of the CancerNext panel + the CTNNA1 gene + additional genes associated with colon polyps and melanoma) offered by [employer].  2) This information should be available in 3-4 weeks.  3) Nathalie will receive a phone call to discuss the results.    Rosamaria Hickman MS, [facility]  Licensed Genetic Counselor  Office: [phone]

## 2020-03-19 NOTE — LETTER
3/19/2020       RE: Nathalie Bashir  [address] [facility] [id number]     Dear Colleague,    Thank you for referring your patient, Nathalie Bashir, to the [facility]. Please see a copy of my visit note below.    Oncology follow up visit:  Date on this visit: 3/19/2020     Nathalie Bashir  is referred by Dr.Sara Gonzalez for an oncology consultation. She requires evaluation for new diagnosis of Gastric cancer.    Primary Physician: Marianne Gonzalez     History Of Present Illness:    Please see my previous note for details.  I have copied and updated from prior note.  He  Ms. Bashir is a 54 year old female who comes in today for newly diagnosed gastric adenocarcinoma.  She tells me that she was being followed for a pancreatic duct dilatation and pancreatic cyst which was noted in November 2018.  Since May 2019 she started noticing some nausea and vomiting and occasional abdominal discomfort.    She had an ultrasound in August 2019 which was essentially unremarkable.  She had a repeat endoscopic ultrasound on 10/29/2019 which showed increase in size of the cyst as well as dilation of the main pancreatic duct.  FNA and fluid analysis showed mucinous epithelium.  She was referred to Dr. 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  Surprisingly there was no pancreatic ductal neoplasm seen but on the resected specimen stomach cancer was noted.  It was poorly differentiated adenocarcinoma with poorly cohesive/signet ring cell type with proximal gastric mucosal and serosal margins being positive.  There was lymphovascular and perineural invasion seen.  22 lymph nodes were sampled and all were benign.  It was a pT4aN0 lesion.  HER-2/christine FISH was not amplified.  Because of the surprising finding she was referred to medical oncology.  Prior to that she was seen by Dr. Mallory who recommended and arranged further restaging procedure with EUS and a PET scan but because of  recent surgery, the timing of the PET scan was moved forward so that it is at least 6 weeks out from the surgery so that it does not interfere with postsurgical inflammation.      EUS on 3/3/2020 showed:     Postop changes consistent with prior non-pylorus                        sparing Whipple resection. Some mucosal nodularity along                        the lesser curvature with scattered exudate. By                        ultrasound this appears to correspond to a staple or                        suture line. No gross evidence of neoplasm, however the                        previous neoplasm was infiltrative and may not be                        readily seen endoscopically.                        Non-specific loss of sonographic wall layers along the                        lesser curvature in this region adjacent to apparent                        suture. This is potentially consistent with                        post-operative edema, however it is not possible to                        completely exclude infiltrative tumor in this region.                        There was no target for needle aspiration (wall 3 mm in                        this region). Forceops biopsies obtained from this                        region and separately along the gastrojejunal                        anastomosis.                        - Two prominent left gastric artery lymph nodes. Needle                        aspiration performed and preliminary cytology suggested                        benign lymphoid material.                        - No liver lesions seen in the visualized portion                        (limited by post-op changes).     Left gastric lymph node fine-needle aspiration was negative for carcinoma.  Biopsy from the gastric jejunal anastomosis as well as lesser curvature of the stomach also did not show any malignancy.      She had a PET/CT on 3/13/2020 which showed soft tissue streaky density with increased FDG  uptake adjacent to the pancreaticojejunostomy which could be neoplastic in origin.  There is mild increased FDG uptake in the gastric remnant.  Focal area of soft tissue thickening with fatty streakiness along medial laparotomy with increased FDG uptake which likely is inflammatory.  Increased FDG uptake in thoracic esophagus which could be esophagitis.      She is feeling well.  She denies any pain.  Has occasional nausea but denies any vomiting.  Bowel movements are good.  Denies any infections.  No new swellings.  No neuropathy.  No issues with bleeding.  No shortness of breath.      ECOG 0-1    ROS:  Rest of the comprehensive review of the system was essentially unremarkable.      I reviewed other history in epic as below.    Past Medical/Surgical History:  Past Medical History:   Diagnosis Date     Allergic rhinitis      Depression      Lumbago      Migraine      Other chronic pain     low back     Sacroiliitis (H)     low back pain     Trochanteric bursitis     leg length discrrepancy, hip pain     She has history of left leg trauma when she was 4 years of age and since then she has had issues with the left leg length and developed arthritis in the left hip requiring left hip replacement in 2016.  She also had left sacroiliac joint fusion several years ago.      Past Surgical History:   Procedure Laterality Date     ARTHROPLASTY HIP Left 2016     BACK SURGERY      L4-5 decompression     C REPAIR DETACH RETINA,SCLERAL BUCKLE       CATARACT IOL, RT/LT       CHOLECYSTECTOMY N/A 1/28/2020    Procedure: Cholecystectomy;  Surgeon: Yandel Mallory MD;  Location:  OR     ESOPHAGOSCOPY, GASTROSCOPY, DUODENOSCOPY (EGD), COMBINED N/A 3/3/2020    Procedure: ESOPHAGOGASTRODUODENOSCOPY, WITH ENDOSCOPIC US (enoxaparin);  Surgeon: Bakari Plata MD;  Location:  GI     EYE SURGERY       OPEN REDUCTION INTERNAL FIXATION RODDING INTRAMEDULLARY FEMUR Left 12/23/2014    Procedure: OPEN REDUCTION INTERNAL FIXATION RODDING  INTRAMEDULLARY FEMUR;  Surgeon: Arnol Santiago MD;  Location: [facility] OR     ORTHOPEDIC SURGERY       PICC DOUBLE LUMEN PLACEMENT Right 02/07/2020    5 fr double lumen 41 cm     REMOVE HARDWARE LOWER EXTREMITY Left 4/7/2015    Procedure: REMOVE HARDWARE LOWER EXTREMITY;  Surgeon: Arnol Santiago MD;  Location: [facility] OR     REMOVE HARDWARE RODDING INTRAMEDULLARY FEMUR Left 12/17/2015    Procedure: REMOVE HARDWARE RODDING INTRAMEDULLARY FEMUR;  Surgeon: Arnol Santiago MD;  Location: [facility] OR     RESECT BONE LOWER EXTREMITY Left 12/23/2014    Procedure: RESECT BONE LOWER EXTREMITY;  Surgeon: Arnol Santiago MD;  [facility]: [facility]     WHIPPLE PROCEDURE N/A 1/28/2020    Procedure: Open Whipple procedure and Cholecystectomy;  Surgeon: Yandel Mallory MD;  Location: [facility] OR     Cancer History:   As above    Allergies:  Allergies as of 03/19/2020 - Reviewed 03/19/2020   Allergen Reaction Noted     Gluten meal Palpitations 01/17/2020     Pregabalin  12/15/2015     Augmentin Rash 02/27/2020     Acetaminophen Nausea and Other (See Comments) 07/13/2017     Dust mite extract  12/15/2015     Gabapentin GI Disturbance 12/15/2015     Methadone Fatigue 12/15/2015     Mold  12/15/2015     Naprosyn [naproxen] GI Disturbance 12/15/2015     Oxycontin [oxycodone]  01/17/2020     Oxymetholone  01/17/2020     Seasonal allergies  12/15/2015     Topiramate Other (See Comments) 04/04/2016     Latex Rash 12/19/2014     Current Medications:  Current Outpatient Medications   Medication Sig Dispense Refill     amitriptyline (ELAVIL) 25 MG tablet Take 3 tablets by mouth At Bedtime       calcium carb 1250 mg, 500 mg [place],/vitamin D 200 units (OSCAL WITH D) 500-200 MG-UNIT per tablet Take 1 tablet by mouth 3 times daily (with meals)       citalopram (CELEXA) 20 MG tablet Take 20 mg by mouth every morning        diclofenac (FLECTOR) 1.3 % patch Place 1 patch onto the skin daily as needed        fluticasone (FLONASE) 50 MCG/ACT nasal spray Spray 2  sprays into both nostrils daily as needed        hydrOXYzine (ATARAX) 10 MG tablet Take 10 mg by mouth 3 times daily as needed for itching       methocarbamol (ROBAXIN) 500 MG tablet Take 1 tablet (500 mg) by mouth 4 times daily 40 tablet 0     multivitamin, therapeutic with minerals (MULTI-VITAMIN) TABS Take 1 tablet by mouth daily       pantoprazole (PROTONIX) 40 MG EC tablet Take 1 tablet (40 mg) by mouth every morning (before breakfast) 90 tablet 0     SUMAtriptan (IMITREX) 100 MG tablet Take 100 mg by mouth as needed       tiZANidine (ZANAFLEX) 4 MG tablet Take 1 tablet by mouth 2 times daily as needed        Family History:  Family History   Problem Relation Age of Onset     Heart Failure Mother 77     Anesthesia Reaction No family hx of      Deep Vein Thrombosis No family hx of    Paternal grandmother had uterine cancer at 97.  Patient has 1 son who is healthy.      Social History:  Social History     Socioeconomic History     Marital status:      Spouse name: Not on file     Number of children: Not on file     Years of education: Not on file     Highest education level: Not on file   Occupational History     Not on file   Social Needs     Financial resource strain: Not on file     Food insecurity     Worry: Not on file     Inability: Not on file     Transportation needs     Medical: Not on file     Non-medical: Not on file   Tobacco Use     Smoking status: Former Smoker     Packs/day: 0.50     Years: 25.00     Pack years: 12.50     Types: Cigarettes     Last attempt to quit: 2020     Years since quittin.2     Smokeless tobacco: Never Used   Substance and Sexual Activity     Alcohol use: No     Drug use: No     Sexual activity: Not on file   Lifestyle     Physical activity     Days per week: Not on file     Minutes per session: Not on file     Stress: Not on file   Relationships     Social connections     Talks on phone: Not on file     Gets together: Not on file     Attends Gnosticism service:  "Not on file     Active member of club or organization: Not on file     Attends meetings of clubs or organizations: Not on file     Relationship status: Not on file     Intimate partner violence     Fear of current or ex partner: Not on file     Emotionally abused: Not on file     Physically abused: Not on file     Forced sexual activity: Not on file   Other Topics Concern     Parent/sibling w/ CABG, MI or angioplasty before 65F 55M? Not Asked   Social History Narrative     Not on file     She used to smoke but quit on the date of her surgery.  Denies alcohol use.  Lives with her , son and 3 grandkids.    Physical Exam:  /75   Pulse 100   Temp 98.4  F (36.9  C) (Oral)   Ht 1.727 m (5' 8\")   Wt 82.2 kg (181 lb 3.2 oz)   LMP 09/23/2014   SpO2 98%   BMI 27.55 kg/m       Wt Readings from Last 4 Encounters:   03/19/20 82.2 kg (181 lb 3.2 oz)   03/10/20 80.3 kg (177 lb)   03/06/20 84.4 kg (186 lb)   03/03/20 83.2 kg (183 lb 8 oz)     CONSTITUTIONAL: No apparent distress  EYES: PERRLA, without pallor or jaundice  ENT/MOUTH: Ears unremarkable. No oral lesions  CVS: s1s2 normal  RESPIRATORY: Chest is clear  GI: Abdomen is benign.  Nicely healed surgical incision.  NEURO: Alert and oriented ×3  INTEGUMENT: no concerning skin rashes   LYMPHATIC: no palpable lymphadenopathy  MUSCULOSKELETAL: Unremarkable. No bony tenderness.   EXTREMITIES: no pedal edema  PSYCH: Mentation, mood and affect are appropriate      Laboratory/Imaging Studies      Reviewed    Combined Report of:    PET and CT on  3/13/2020 1:20 PM :     1. PET of the neck, chest, abdomen, and pelvis.  2. PET CT Fusion for Attenuation Correction and Anatomical  Localization:    3. Diagnostic CT scan of the chest, abdomen, and pelvis with  intravenous contrast for interpretation.  4. CT of the chest, abdomen and pelvis obtained for diagnostic  interpretation.  5. 3D MIP and PET-CT fused images were processed on an independent  workstation and archived " to PACS and reviewed by a radiologist.     Technique:     1. PET: The patient received 10.54 mCi of F-18-FDG; the serum glucose  was 112 prior to administration, body weight was 80.3 kg. Images were  evaluated in the axial, sagittal, and coronal planes as well as the  rotational whole body MIP. Images were acquired from the Vertex to the  Feet.     UPTAKE WAS MEASURED AT 64 MINUTES.      BACKGROUND:  Liver SUV max= 4.75,   Aorta Blood SUV Max: 3.5.      2. CT: Volumetric acquisition for clinical interpretation of the  chest, abdomen, and pelvis acquired at 3 mm sections . The chest,  abdomen, and pelvis were evaluated at 5 mm sections in bone, soft  tissue, and lung windows.       The patient received 108 cc. Of Isovue 370 intravenously for the  examination.    500 mL oral contrast was also administered.     3. 3D MIP and PET-CT fused images were processed on an independent  workstation and archived to PACS and reviewed by a radiologist.     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 Whipple specimen showed incidental T4 diffuse stage signet  ring gastric cancer with microscopically positive margin     COMPARISON: CT abdomen 2/10/2020     FINDINGS:      HEAD/NECK:  There is no  suspicious FDG uptake in the neck.      Left globe scleral buckle.     The paranasal sinuses are clear. The mastoid air cells are clear.  Physiological uptake in the brain, extraocular muscles, palatine  tonsils and vocal cord.      The mucosal pharyngeal space, the , prevertebral and  carotid spaces are within normal limits.      No masses, mass effect or pathologically enlarged lymph nodes are  evident. The thyroid gland is within normal  limits.     CHEST:     Diffuse increased uptake in the thoracic esophagus extending to the  gastroesophageal junction with SUV max 5.79.     Right subpleural basilar scarring or atelectasis.    There are no pathologically enlarged mediastinal, hilar or axillary  lymph nodes.  There are no suspicious lung nodules or evidence for infection.        There is no significant pericardial or pleural effusions.     ABDOMEN AND PELVIS:     Postsurgical changes of Whipple's surgery. Thickening of the jejunal  loop adjacent to the pancreaticojejunostomy with adjacent streaky soft  tissue density and intense uptake of SUV max 14.89, corresponding to  area of soft tissue thickening and streakiness (series 5, image 277).   There is intense FDG uptake along the median laparotomy scar with area  of soft tissue thickening at the level of right hilum showing SUV max  7.17. There is mild dilatation of the main pancreatic duct in the  remnant pancreas. Mild soft tissue thickening with increased uptake  involving the distal gastric remnant, with SUV Max 4.70. Mild  prominence of the common bile duct with circumferential enhancement  and adjacent increased FDG uptake of SUV Max 6.18.     There are no suspicious hepatic lesions. There is no splenomegaly or  evidence for splenic mass lesion. Gallbladder is surgically absent.  There are no suspicious adrenal mass lesions.  There is symmetric  nephrographic renal phase without hydronephrosis. Mild colonic  diverticulosis. There is no evidence for diverticulitis, bowel  obstruction or free fluid.  Left para-aortic lymph nodes node,  infrarenal with FDG uptake of 2.23, likely reactive.     LOWER EXTREMITIES:   No abnormal masses or hypermetabolic lesions.     BONES:   There are no suspicious lytic or blastic osseous lesions.  There is no  abnormal FDG uptake in the skeleton. Post surgical changes of total  left hip arthroplasty                                                                          IMPRESSION: In this patient with history of Whipple surgery with  gastric cancer and positive margins, the current scan shows:     1. Soft tissue streaky density with intense FDG uptake adjacent to the  pancreaticojejunostomy, concerning for neoplastic etiology.  2. Enhancement with increased FDG along the common bile duct, likely  inflammatory.  3. Mild increased FDG uptake in the gastric remnant, may represent  postsurgical change versus residual disease.  4. Focal area of soft tissue thickening with fatty streakiness along  median laparotomy with increased FDG uptake, likely inflammatory.  5. Increased FDG uptake in the thoracic esophagus, which may be seen  with esophagitis.  6. No hypermetabolic focal lesion in head and neck, chest and lower  extremities.  7. Postsurgical changes of Whipple surgery with prominence of main  pancreatic duct in the pancreatic remnant.          ASSESSMENT/PLAN:    Incidentally found poorly differentiated signet ring type gastric adenocarcinoma, HER-2 negative.  This was found on Whipple surgery specimen as it was done for presumed pancreatic IPMN although no pancreatic neoplasm was found.  It was a pT4aN0 lesion with lymphovascular invasion and perineural invasion seen.  All 22 lymph nodes were negative.    EUS which was done on 3/3/2020 did not show any convincing evidence of malignancy.  Left gastric lymph node fine-needle aspiration was negative for carcinoma.  Biopsy from the gastric jejunal anastomosis as well as lesser curvature of the stomach also did not show any malignancy.      She had a PET/CT on 3/13/2020 which showed soft tissue streaky density with increased FDG uptake adjacent to the pancreaticojejunostomy which could be neoplastic in origin.  Other findings were likely reactive.  No other evidence of metastatic disease.    We discussed the situation in detail.  I am not certain what to make of this soft tissue streaky density adjacent to the  pancreaticojejunostomy.  EUS did not show any other concerning findings.  I believe we should go ahead and start chemotherapy.  I would also discuss her case with Dr. Mallory.    We discussed need to start flocked chemotherapy.  We discussed the rational schedule and potential side effects of it in detail.  She is supposed to get Port-A-Cath tomorrow and start chemotherapy on 3/27/2020.  I would like to give her 2 months of chemotherapy before repeating scans.      Discussion regarding genetic testing.  She met with GC today. Awaiting results.     Anemia.  Likely because of underlying cancer as well as recent surgery. Cont to observe.     Chronic pain.  She will follow with pain clinic as before.    She will RTC 2 weeks after start of chemo before C#2.    All their questions were answered to her satisfaction.  She is agreeable and comfortable with the plan.    Magaly Pollard MD

## 2020-03-19 NOTE — PROGRESS NOTES
Oncology follow up visit:  Date on this visit: 3/19/2020     Nathalie Bashir  is referred by Dr.Sara Gonzalez for an oncology consultation. She requires evaluation for new diagnosis of Gastric cancer.    Primary Physician: Marianne Gonzalez     History Of Present Illness:    Please see my previous note for details.  I have copied and updated from prior note.  He  Ms. Bashir is a 54 year old female who comes in today for newly diagnosed gastric adenocarcinoma.  She tells me that she was being followed for a pancreatic duct dilatation and pancreatic cyst which was noted in November 2018.  Since May 2019 she started noticing some nausea and vomiting and occasional abdominal discomfort.    She had an ultrasound in August 2019 which was essentially unremarkable.  She had a repeat endoscopic ultrasound on 10/29/2019 which showed increase in size of the cyst as well as dilation of the main pancreatic duct.  FNA and fluid analysis showed mucinous epithelium.  She was referred to Dr. 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  Surprisingly there was no pancreatic ductal neoplasm seen but on the resected specimen stomach cancer was noted.  It was poorly differentiated adenocarcinoma with poorly cohesive/signet ring cell type with proximal gastric mucosal and serosal margins being positive.  There was lymphovascular and perineural invasion seen.  22 lymph nodes were sampled and all were benign.  It was a pT4aN0 lesion.  HER-2/christine FISH was not amplified.  Because of the surprising finding she was referred to medical oncology.  Prior to that she was seen by Dr. Mallory who recommended and arranged further restaging procedure with EUS and a PET scan but because of recent surgery, the timing of the PET scan was moved forward so that it is at least 6 weeks out from the surgery so that it does not interfere with postsurgical inflammation.      EUS on 3/3/2020 showed:     Postop changes consistent with  prior non-pylorus                        sparing Whipple resection. Some mucosal nodularity along                        the lesser curvature with scattered exudate. By                        ultrasound this appears to correspond to a staple or                        suture line. No gross evidence of neoplasm, however the                        previous neoplasm was infiltrative and may not be                        readily seen endoscopically.                        Non-specific loss of sonographic wall layers along the                        lesser curvature in this region adjacent to apparent                        suture. This is potentially consistent with                        post-operative edema, however it is not possible to                        completely exclude infiltrative tumor in this region.                        There was no target for needle aspiration (wall 3 mm in                        this region). Forceops biopsies obtained from this                        region and separately along the gastrojejunal                        anastomosis.                        - Two prominent left gastric artery lymph nodes. Needle                        aspiration performed and preliminary cytology suggested                        benign lymphoid material.                        - No liver lesions seen in the visualized portion                        (limited by post-op changes).     Left gastric lymph node fine-needle aspiration was negative for carcinoma.  Biopsy from the gastric jejunal anastomosis as well as lesser curvature of the stomach also did not show any malignancy.      She had a PET/CT on 3/13/2020 which showed soft tissue streaky density with increased FDG uptake adjacent to the pancreaticojejunostomy which could be neoplastic in origin.  There is mild increased FDG uptake in the gastric remnant.  Focal area of soft tissue thickening with fatty streakiness along medial laparotomy with  increased FDG uptake which likely is inflammatory.  Increased FDG uptake in thoracic esophagus which could be esophagitis.      She is feeling well.  She denies any pain.  Has occasional nausea but denies any vomiting.  Bowel movements are good.  Denies any infections.  No new swellings.  No neuropathy.  No issues with bleeding.  No shortness of breath.      ECOG 0-1    ROS:  Rest of the comprehensive review of the system was essentially unremarkable.      I reviewed other history in epic as below.    Past Medical/Surgical History:  Past Medical History:   Diagnosis Date     Allergic rhinitis      Depression      Lumbago      Migraine      Other chronic pain     low back     Sacroiliitis (H)     low back pain     Trochanteric bursitis     leg length discrrepancy, hip pain     She has history of left leg trauma when she was 4 years of age and since then she has had issues with the left leg length and developed arthritis in the left hip requiring left hip replacement in 2016.  She also had left sacroiliac joint fusion several years ago.      Past Surgical History:   Procedure Laterality Date     ARTHROPLASTY HIP Left 2016     BACK SURGERY      L4-5 decompression     C REPAIR DETACH RETINA,SCLERAL BUCKLE       CATARACT IOL, RT/LT       CHOLECYSTECTOMY N/A 1/28/2020    Procedure: Cholecystectomy;  Surgeon: Yandel Mallory MD;  Location:  OR     ESOPHAGOSCOPY, GASTROSCOPY, DUODENOSCOPY (EGD), COMBINED N/A 3/3/2020    Procedure: ESOPHAGOGASTRODUODENOSCOPY, WITH ENDOSCOPIC US (enoxaparin);  Surgeon: Bakari Plata MD;  [facility]     EYE SURGERY       OPEN REDUCTION INTERNAL FIXATION RODDING INTRAMEDULLARY FEMUR Left 12/23/2014    Procedure: OPEN REDUCTION INTERNAL FIXATION RODDING INTRAMEDULLARY FEMUR;  Surgeon: Arnol Santiago MD;  Location:  OR     ORTHOPEDIC SURGERY       PICC DOUBLE LUMEN PLACEMENT Right 02/07/2020    5 fr double lumen 41 cm     REMOVE HARDWARE LOWER EXTREMITY Left 4/7/2015     Procedure: REMOVE HARDWARE LOWER EXTREMITY;  Surgeon: Arnol Santiago MD;  Location: [facility]     REMOVE HARDWARE RODDING INTRAMEDULLARY FEMUR Left 12/17/2015    Procedure: REMOVE HARDWARE RODDING INTRAMEDULLARY FEMUR;  Surgeon: Arnol Santiago MD;  [facility]     RESECT BONE LOWER EXTREMITY Left 12/23/2014    Procedure: RESECT BONE LOWER EXTREMITY;  Surgeon: Arnol Santiago MD;  [facility]     WHIPPLE PROCEDURE N/A 1/28/2020    Procedure: Open Whipple procedure and Cholecystectomy;  Surgeon: Yandel Mallory MD;  [facility]     Cancer History:   As above    Allergies:  Allergies as of 03/19/2020 - Reviewed 03/19/2020   Allergen Reaction Noted     Gluten meal Palpitations 01/17/2020     Pregabalin  12/15/2015     Augmentin Rash 02/27/2020     Acetaminophen Nausea and Other (See Comments) 07/13/2017     Dust mite extract  12/15/2015     Gabapentin GI Disturbance 12/15/2015     Methadone Fatigue 12/15/2015     Mold  12/15/2015     Naprosyn [naproxen] GI Disturbance 12/15/2015     Oxycontin [oxycodone]  01/17/2020     Oxymetholone  01/17/2020     Seasonal allergies  12/15/2015     Topiramate Other (See Comments) 04/04/2016     Latex Rash 12/19/2014     Current Medications:  Current Outpatient Medications   Medication Sig Dispense Refill     amitriptyline (ELAVIL) 25 MG tablet Take 3 tablets by mouth At Bedtime       calcium carb 1250 mg, 500 mg [place],/vitamin D 200 units (OSCAL WITH D) 500-200 MG-UNIT per tablet Take 1 tablet by mouth 3 times daily (with meals)       citalopram (CELEXA) 20 MG tablet Take 20 mg by mouth every morning        diclofenac (FLECTOR) 1.3 % patch Place 1 patch onto the skin daily as needed        fluticasone (FLONASE) 50 MCG/ACT nasal spray Spray 2 sprays into both nostrils daily as needed        hydrOXYzine (ATARAX) 10 MG tablet Take 10 mg by mouth 3 times daily as needed for itching       methocarbamol (ROBAXIN) 500 MG tablet Take 1 tablet (500 mg) by mouth 4 times daily 40  tablet 0     multivitamin, therapeutic with minerals (MULTI-VITAMIN) TABS Take 1 tablet by mouth daily       pantoprazole (PROTONIX) 40 MG EC tablet Take 1 tablet (40 mg) by mouth every morning (before breakfast) 90 tablet 0     SUMAtriptan (IMITREX) 100 MG tablet Take 100 mg by mouth as needed       tiZANidine (ZANAFLEX) 4 MG tablet Take 1 tablet by mouth 2 times daily as needed        Family History:  Family History   Problem Relation Age of Onset     Heart Failure Mother 77     Anesthesia Reaction No family hx of      Deep Vein Thrombosis No family hx of    Paternal grandmother had uterine cancer at 97.  Patient has 1 son who is healthy.      Social History:  Social History     Socioeconomic History     Marital status:      Spouse name: Not on file     Number of children: Not on file     Years of education: Not on file     Highest education level: Not on file   Occupational History     Not on file   Social Needs     Financial resource strain: Not on file     Food insecurity     Worry: Not on file     Inability: Not on file     Transportation needs     Medical: Not on file     Non-medical: Not on file   Tobacco Use     Smoking status: Former Smoker     Packs/day: 0.50     Years: 25.00     Pack years: 12.50     Types: Cigarettes     Last attempt to quit: 2020     Years since quittin.2     Smokeless tobacco: Never Used   Substance and Sexual Activity     Alcohol use: No     Drug use: No     Sexual activity: Not on file   Lifestyle     Physical activity     Days per week: Not on file     Minutes per session: Not on file     Stress: Not on file   Relationships     Social connections     Talks on phone: Not on file     Gets together: Not on file     Attends Mosque service: Not on file     Active member of club or organization: Not on file     Attends meetings of clubs or organizations: Not on file     Relationship status: Not on file     Intimate partner violence     Fear of current or ex partner: Not  "on file     Emotionally abused: Not on file     Physically abused: Not on file     Forced sexual activity: Not on file   Other Topics Concern     Parent/sibling w/ CABG, MI or angioplasty before 65F 55M? Not Asked   Social History Narrative     Not on file     She used to smoke but quit on the date of her surgery.  Denies alcohol use.  Lives with her , son and 3 grandkids.    Physical Exam:  /75   Pulse 100   Temp 98.4  F (36.9  C) (Oral)   Ht 1.727 m (5' 8\")   Wt 82.2 kg (181 lb 3.2 oz)   LMP 09/23/2014   SpO2 98%   BMI 27.55 kg/m       Wt Readings from Last 4 Encounters:   03/19/20 82.2 kg (181 lb 3.2 oz)   03/10/20 80.3 kg (177 lb)   03/06/20 84.4 kg (186 lb)   03/03/20 83.2 kg (183 lb 8 oz)     CONSTITUTIONAL: No apparent distress  EYES: PERRLA, without pallor or jaundice  ENT/MOUTH: Ears unremarkable. No oral lesions  CVS: s1s2 normal  RESPIRATORY: Chest is clear  GI: Abdomen is benign.  Nicely healed surgical incision.  NEURO: Alert and oriented ×3  INTEGUMENT: no concerning skin rashes   LYMPHATIC: no palpable lymphadenopathy  MUSCULOSKELETAL: Unremarkable. No bony tenderness.   EXTREMITIES: no pedal edema  PSYCH: Mentation, mood and affect are appropriate      Laboratory/Imaging Studies      Reviewed    Combined Report of:    PET and CT on  3/13/2020 1:20 PM :     1. PET of the neck, chest, abdomen, and pelvis.  2. PET CT Fusion for Attenuation Correction and Anatomical  Localization:    3. Diagnostic CT scan of the chest, abdomen, and pelvis with  intravenous contrast for interpretation.  4. CT of the chest, abdomen and pelvis obtained for diagnostic  interpretation.  5. 3D MIP and PET-CT fused images were processed on an independent  workstation and archived to PACS and reviewed by a radiologist.     Technique:     1. PET: The patient received 10.54 mCi of F-18-FDG; the serum glucose  was 112 prior to administration, body weight was 80.3 kg. Images were  evaluated in the axial, " sagittal, and coronal planes as well as the  rotational whole body MIP. Images were acquired from the Vertex to the  Feet.     UPTAKE WAS MEASURED AT 64 MINUTES.      BACKGROUND:  Liver SUV max= 4.75,   Aorta Blood SUV Max: 3.5.      2. CT: Volumetric acquisition for clinical interpretation of the  chest, abdomen, and pelvis acquired at 3 mm sections . The chest,  abdomen, and pelvis were evaluated at 5 mm sections in bone, soft  tissue, and lung windows.       The patient received 108 cc. Of Isovue 370 intravenously for the  examination.    500 mL oral contrast was also administered.     3. 3D MIP and PET-CT fused images were processed on an independent  workstation and archived to PACS and reviewed by a radiologist.     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 Whipple specimen showed incidental T4 diffuse stage signet  ring gastric cancer with microscopically positive margin     COMPARISON: CT abdomen 2/10/2020     FINDINGS:      HEAD/NECK:  There is no  suspicious FDG uptake in the neck.      Left globe scleral buckle.     The paranasal sinuses are clear. The mastoid air cells are clear.  Physiological uptake in the brain, extraocular muscles, palatine  tonsils and vocal cord.      The mucosal pharyngeal space, the , prevertebral and  carotid spaces are within normal limits.      No masses, mass effect or pathologically enlarged lymph nodes are  evident. The thyroid gland is within normal limits.     CHEST:     Diffuse increased uptake in the thoracic esophagus extending to the  gastroesophageal junction with SUV max 5.79.     Right subpleural basilar scarring or atelectasis.    There are no pathologically enlarged mediastinal, hilar  or axillary  lymph nodes.  There are no suspicious lung nodules or evidence for infection.        There is no significant pericardial or pleural effusions.     ABDOMEN AND PELVIS:     Postsurgical changes of Whipple's surgery. Thickening of the jejunal  loop adjacent to the pancreaticojejunostomy with adjacent streaky soft  tissue density and intense uptake of SUV max 14.89, corresponding to  area of soft tissue thickening and streakiness (series 5, image 277).   There is intense FDG uptake along the median laparotomy scar with area  of soft tissue thickening at the level of right hilum showing SUV max  7.17. There is mild dilatation of the main pancreatic duct in the  remnant pancreas. Mild soft tissue thickening with increased uptake  involving the distal gastric remnant, with SUV Max 4.70. Mild  prominence of the common bile duct with circumferential enhancement  and adjacent increased FDG uptake of SUV Max 6.18.     There are no suspicious hepatic lesions. There is no splenomegaly or  evidence for splenic mass lesion. Gallbladder is surgically absent.  There are no suspicious adrenal mass lesions.  There is symmetric  nephrographic renal phase without hydronephrosis. Mild colonic  diverticulosis. There is no evidence for diverticulitis, bowel  obstruction or free fluid.  Left para-aortic lymph nodes node,  infrarenal with FDG uptake of 2.23, likely reactive.     LOWER EXTREMITIES:   No abnormal masses or hypermetabolic lesions.     BONES:   There are no suspicious lytic or blastic osseous lesions.  There is no  abnormal FDG uptake in the skeleton. Post surgical changes of total  left hip arthroplasty                                                                         IMPRESSION: In this patient with history of Whipple surgery with  gastric cancer and positive margins, the current scan shows:     1. Soft tissue streaky density with intense FDG uptake adjacent to the  pancreaticojejunostomy, concerning for  neoplastic etiology.  2. Enhancement with increased FDG along the common bile duct, likely  inflammatory.  3. Mild increased FDG uptake in the gastric remnant, may represent  postsurgical change versus residual disease.  4. Focal area of soft tissue thickening with fatty streakiness along  median laparotomy with increased FDG uptake, likely inflammatory.  5. Increased FDG uptake in the thoracic esophagus, which may be seen  with esophagitis.  6. No hypermetabolic focal lesion in head and neck, chest and lower  extremities.  7. Postsurgical changes of Whipple surgery with prominence of main  pancreatic duct in the pancreatic remnant.          ASSESSMENT/PLAN:    Incidentally found poorly differentiated signet ring type gastric adenocarcinoma, HER-2 negative.  This was found on Whipple surgery specimen as it was done for presumed pancreatic IPMN although no pancreatic neoplasm was found.  It was a pT4aN0 lesion with lymphovascular invasion and perineural invasion seen.  All 22 lymph nodes were negative.    EUS which was done on 3/3/2020 did not show any convincing evidence of malignancy.  Left gastric lymph node fine-needle aspiration was negative for carcinoma.  Biopsy from the gastric jejunal anastomosis as well as lesser curvature of the stomach also did not show any malignancy.      She had a PET/CT on 3/13/2020 which showed soft tissue streaky density with increased FDG uptake adjacent to the pancreaticojejunostomy which could be neoplastic in origin.  Other findings were likely reactive.  No other evidence of metastatic disease.    We discussed the situation in detail.  I am not certain what to make of this soft tissue streaky density adjacent to the pancreaticojejunostomy.  EUS did not show any other concerning findings.  I believe we should go ahead and start chemotherapy.  I would also discuss her case with Dr. Mallory.    We discussed need to start flocked chemotherapy.  We discussed the rational schedule and  potential side effects of it in detail.  She is supposed to get Port-A-Cath tomorrow and start chemotherapy on 3/27/2020.  I would like to give her 2 months of chemotherapy before repeating scans.      Discussion regarding genetic testing.  She met with GC today. Awaiting results.     Anemia.  Likely because of underlying cancer as well as recent surgery. Cont to observe.     Chronic pain.  She will follow with pain clinic as before.    She will RTC 2 weeks after start of chemo before C#2.    All their questions were answered to her satisfaction.  She is agreeable and comfortable with the plan.    Magaly Pollard MD      Addendum:  I discussed with Dr Mallory and he also believes that we should go ahead with chemotherapy as the increased FDG activity around the pancreatojejunostomy is post surgical and likely inflammatory.As per him, she probably had a very small leak that formed a phlegmon. and it would be okay to start chemotherapy as planned and we can look at new scans in a couple of months.     Magaly Pollard MD  3/24/2020

## 2020-03-19 NOTE — NURSING NOTE
"Oncology Rooming Note    March 19, 2020 1:35 PM   Nathalie Bashir is a 54 year old female who presents for:    Chief Complaint   Patient presents with     Oncology Clinic Visit     [facility] RETURN; GASTRIC CANCER     Initial Vitals: /75   Pulse 100   Temp 98.4  F (36.9  C) (Oral)   Ht 1.727 m (5' 8\")   Wt 82.2 kg (181 lb 3.2 oz)   LMP 09/23/2014   SpO2 98%   BMI 27.55 kg/m   Estimated body mass index is 27.55 kg/m  as calculated from the following:    Height as of this encounter: 1.727 m (5' 8\").    Weight as of this encounter: 82.2 kg (181 lb 3.2 oz). Body surface area is 1.99 meters squared.  No Pain (0) Comment: Data Unavailable   Patient's last menstrual period was 09/23/2014.  Allergies reviewed: Yes  Medications reviewed: Yes    Medications: Medication refills not needed today.  Pharmacy name entered into [employer]: [id number] [place] [address]    Clinical concerns: Will surgery still be done tomorrow?  Dr. Pollard was notified.      Astrid Art, [facility]              "

## 2020-03-20 ENCOUNTER — HOSPITAL ENCOUNTER (OUTPATIENT)
Facility: AMBULATORY SURGERY CENTER | Age: 55
End: 2020-03-20
Attending: PHYSICIAN ASSISTANT
Payer: MEDICARE

## 2020-03-20 ENCOUNTER — HOSPITAL ENCOUNTER (OUTPATIENT)
Facility: CLINIC | Age: 55
End: 2020-03-20
Admitting: INTERNAL MEDICINE
Payer: MEDICARE

## 2020-03-24 ENCOUNTER — PATIENT OUTREACH (OUTPATIENT)
Dept: ONCOLOGY | Facility: CLINIC | Age: 55
End: 2020-03-24

## 2020-03-24 ENCOUNTER — HOME INFUSION (PRE-WILLOW HOME INFUSION) (OUTPATIENT)
Dept: PHARMACY | Facility: CLINIC | Age: 55
End: 2020-03-24

## 2020-03-24 DIAGNOSIS — C16.9 GASTRIC ADENOCARCINOMA (H): Primary | ICD-10-CM

## 2020-03-24 RX ORDER — DEXAMETHASONE 4 MG/1
8 TABLET ORAL 2 TIMES DAILY WITH MEALS
Qty: 12 TABLET | Refills: 7 | Status: SHIPPED | OUTPATIENT
Start: 2020-03-24 | End: 2020-04-20

## 2020-03-24 RX ORDER — EPINEPHRINE 1 MG/ML
0.3 INJECTION, SOLUTION INTRAMUSCULAR; SUBCUTANEOUS EVERY 5 MIN PRN
Status: CANCELLED | OUTPATIENT
Start: 2020-03-27

## 2020-03-24 RX ORDER — ALBUTEROL SULFATE 0.83 MG/ML
2.5 SOLUTION RESPIRATORY (INHALATION)
Status: CANCELLED | OUTPATIENT
Start: 2020-03-27

## 2020-03-24 RX ORDER — LORAZEPAM 2 MG/ML
0.5 INJECTION INTRAMUSCULAR EVERY 4 HOURS PRN
Status: CANCELLED
Start: 2020-03-27

## 2020-03-24 RX ORDER — NALOXONE HYDROCHLORIDE 0.4 MG/ML
.1-.4 INJECTION, SOLUTION INTRAMUSCULAR; INTRAVENOUS; SUBCUTANEOUS
Status: CANCELLED | OUTPATIENT
Start: 2020-03-27

## 2020-03-24 RX ORDER — ALBUTEROL SULFATE 90 UG/1
1-2 AEROSOL, METERED RESPIRATORY (INHALATION)
Status: CANCELLED
Start: 2020-03-27

## 2020-03-24 RX ORDER — MEPERIDINE HYDROCHLORIDE 25 MG/ML
25 INJECTION INTRAMUSCULAR; INTRAVENOUS; SUBCUTANEOUS EVERY 30 MIN PRN
Status: CANCELLED | OUTPATIENT
Start: 2020-03-27

## 2020-03-24 RX ORDER — DIPHENHYDRAMINE HYDROCHLORIDE 50 MG/ML
50 INJECTION INTRAMUSCULAR; INTRAVENOUS
Status: CANCELLED
Start: 2020-03-27

## 2020-03-24 RX ORDER — EPINEPHRINE 0.3 MG/.3ML
0.3 INJECTION SUBCUTANEOUS EVERY 5 MIN PRN
Status: CANCELLED | OUTPATIENT
Start: 2020-03-27

## 2020-03-24 RX ORDER — METHYLPREDNISOLONE SODIUM SUCCINATE 125 MG/2ML
125 INJECTION, POWDER, LYOPHILIZED, FOR SOLUTION INTRAMUSCULAR; INTRAVENOUS
Status: CANCELLED
Start: 2020-03-27

## 2020-03-24 RX ORDER — SODIUM CHLORIDE 9 MG/ML
1000 INJECTION, SOLUTION INTRAVENOUS CONTINUOUS PRN
Status: CANCELLED
Start: 2020-03-27

## 2020-03-24 NOTE — PROGRESS NOTES
Therapy: 5fu  Insurance: Medicare/BCBS-Supplement      100% coverage for 5fu pump.    Must be homebound for nursing     In reference to referral on 3/24 to check coverage for 5fu pump    Please contact Intake with any questions, [phone] or In Basket pool, [facility] ([phone]).

## 2020-03-26 ENCOUNTER — HOSPITAL ENCOUNTER (OUTPATIENT)
Facility: CLINIC | Age: 55
Discharge: HOME | End: 2020-03-26
Attending: RADIOLOGY | Admitting: RADIOLOGY
Payer: MEDICARE

## 2020-03-26 ENCOUNTER — PATIENT OUTREACH (OUTPATIENT)
Dept: ONCOLOGY | Facility: CLINIC | Age: 55
End: 2020-03-26

## 2020-03-26 ENCOUNTER — APPOINTMENT (OUTPATIENT)
Dept: INTERVENTIONAL RADIOLOGY/VASCULAR | Facility: CLINIC | Age: 55
End: 2020-03-26
Attending: INTERNAL MEDICINE
Payer: MEDICARE

## 2020-03-26 VITALS
SYSTOLIC BLOOD PRESSURE: 106 MMHG | HEIGHT: 68 IN | HEART RATE: 86 BPM | OXYGEN SATURATION: 98 % | TEMPERATURE: 98.8 F | DIASTOLIC BLOOD PRESSURE: 66 MMHG | BODY MASS INDEX: 27.28 KG/M2 | WEIGHT: 180 LBS | RESPIRATION RATE: 16 BRPM

## 2020-03-26 DIAGNOSIS — C16.3 MALIGNANT NEOPLASM OF PYLORIC ANTRUM (H): ICD-10-CM

## 2020-03-26 DIAGNOSIS — D64.9 ANEMIA, UNSPECIFIED TYPE: ICD-10-CM

## 2020-03-26 LAB — INR PPP: 1.04 (ref 0.86–1.14)

## 2020-03-26 PROCEDURE — 27211193 ZZ H WOUND GLUE CR1

## 2020-03-26 PROCEDURE — 25000125 ZZHC RX 250

## 2020-03-26 PROCEDURE — 25000125 ZZHC RX 250: Performed by: PHYSICIAN ASSISTANT

## 2020-03-26 PROCEDURE — 99153 MOD SED SAME PHYS/QHP EA: CPT

## 2020-03-26 PROCEDURE — 25000128 H RX IP 250 OP 636: Performed by: RADIOLOGY

## 2020-03-26 PROCEDURE — 85610 PROTHROMBIN TIME: CPT | Performed by: RADIOLOGY

## 2020-03-26 PROCEDURE — C1788 PORT, INDWELLING, IMP: HCPCS

## 2020-03-26 PROCEDURE — 27210886 ZZH ACCESSORY CR5

## 2020-03-26 PROCEDURE — 40000854 ZZH STATISTIC SIMPLE TUBE INSERTION/CHARGE, PORT, CATH, FISTULOGRAM

## 2020-03-26 PROCEDURE — 25000128 H RX IP 250 OP 636

## 2020-03-26 RX ORDER — NALOXONE HYDROCHLORIDE 0.4 MG/ML
.1-.4 INJECTION, SOLUTION INTRAMUSCULAR; INTRAVENOUS; SUBCUTANEOUS
Status: DISCONTINUED | OUTPATIENT
Start: 2020-03-26 | End: 2020-03-26 | Stop reason: HOSPADM

## 2020-03-26 RX ORDER — FENTANYL CITRATE 50 UG/ML
INJECTION, SOLUTION INTRAMUSCULAR; INTRAVENOUS
Status: COMPLETED
Start: 2020-03-26 | End: 2020-03-26

## 2020-03-26 RX ORDER — DEXTROSE MONOHYDRATE 25 G/50ML
25-50 INJECTION, SOLUTION INTRAVENOUS
Status: DISCONTINUED | OUTPATIENT
Start: 2020-03-26 | End: 2020-03-26 | Stop reason: HOSPADM

## 2020-03-26 RX ORDER — NICOTINE POLACRILEX 4 MG
15-30 LOZENGE BUCCAL
Status: DISCONTINUED | OUTPATIENT
Start: 2020-03-26 | End: 2020-03-26 | Stop reason: HOSPADM

## 2020-03-26 RX ORDER — HEPARIN SODIUM (PORCINE) LOCK FLUSH IV SOLN 100 UNIT/ML 100 UNIT/ML
500 SOLUTION INTRAVENOUS ONCE
Status: COMPLETED | OUTPATIENT
Start: 2020-03-26 | End: 2020-03-26

## 2020-03-26 RX ORDER — CLINDAMYCIN PHOSPHATE 900 MG/50ML
900 INJECTION, SOLUTION INTRAVENOUS
Status: COMPLETED | OUTPATIENT
Start: 2020-03-26 | End: 2020-03-26

## 2020-03-26 RX ORDER — FENTANYL CITRATE 50 UG/ML
25-50 INJECTION, SOLUTION INTRAMUSCULAR; INTRAVENOUS EVERY 5 MIN PRN
Status: DISCONTINUED | OUTPATIENT
Start: 2020-03-26 | End: 2020-03-26 | Stop reason: HOSPADM

## 2020-03-26 RX ORDER — LIDOCAINE HYDROCHLORIDE 10 MG/ML
INJECTION, SOLUTION INFILTRATION; PERINEURAL
Status: COMPLETED
Start: 2020-03-26 | End: 2020-03-26

## 2020-03-26 RX ORDER — HEPARIN SODIUM (PORCINE) LOCK FLUSH IV SOLN 100 UNIT/ML 100 UNIT/ML
SOLUTION INTRAVENOUS
Status: COMPLETED
Start: 2020-03-26 | End: 2020-03-26

## 2020-03-26 RX ORDER — HEPARIN SODIUM (PORCINE) LOCK FLUSH IV SOLN 100 UNIT/ML 100 UNIT/ML
5 SOLUTION INTRAVENOUS
Status: DISCONTINUED | OUTPATIENT
Start: 2020-03-26 | End: 2020-03-26 | Stop reason: HOSPADM

## 2020-03-26 RX ORDER — FLUMAZENIL 0.1 MG/ML
0.2 INJECTION, SOLUTION INTRAVENOUS
Status: DISCONTINUED | OUTPATIENT
Start: 2020-03-26 | End: 2020-03-26 | Stop reason: HOSPADM

## 2020-03-26 RX ORDER — HEPARIN SODIUM,PORCINE 10 UNIT/ML
5 VIAL (ML) INTRAVENOUS EVERY 24 HOURS
Status: DISCONTINUED | OUTPATIENT
Start: 2020-03-26 | End: 2020-03-26 | Stop reason: HOSPADM

## 2020-03-26 RX ADMIN — FENTANYL CITRATE 50 MCG: 50 INJECTION, SOLUTION INTRAMUSCULAR; INTRAVENOUS at 08:36

## 2020-03-26 RX ADMIN — MIDAZOLAM HYDROCHLORIDE 1 MG: 1 INJECTION, SOLUTION INTRAMUSCULAR; INTRAVENOUS at 08:30

## 2020-03-26 RX ADMIN — FENTANYL CITRATE 50 MCG: 50 INJECTION, SOLUTION INTRAMUSCULAR; INTRAVENOUS at 08:43

## 2020-03-26 RX ADMIN — MIDAZOLAM HYDROCHLORIDE 1 MG: 1 INJECTION, SOLUTION INTRAMUSCULAR; INTRAVENOUS at 08:25

## 2020-03-26 RX ADMIN — FENTANYL CITRATE 50 MCG: 50 INJECTION, SOLUTION INTRAMUSCULAR; INTRAVENOUS at 08:25

## 2020-03-26 RX ADMIN — MIDAZOLAM HYDROCHLORIDE 1 MG: 1 INJECTION, SOLUTION INTRAMUSCULAR; INTRAVENOUS at 08:43

## 2020-03-26 RX ADMIN — MIDAZOLAM HYDROCHLORIDE 1 MG: 1 INJECTION, SOLUTION INTRAMUSCULAR; INTRAVENOUS at 08:36

## 2020-03-26 RX ADMIN — CLINDAMYCIN PHOSPHATE 900 MG: 900 INJECTION, SOLUTION INTRAVENOUS at 07:58

## 2020-03-26 RX ADMIN — FENTANYL CITRATE 50 MCG: 50 INJECTION, SOLUTION INTRAMUSCULAR; INTRAVENOUS at 08:30

## 2020-03-26 RX ADMIN — HEPARIN SODIUM (PORCINE) LOCK FLUSH IV SOLN 100 UNIT/ML 500 UNITS: 100 SOLUTION at 08:45

## 2020-03-26 RX ADMIN — LIDOCAINE HYDROCHLORIDE 10 ML: 10 INJECTION, SOLUTION INFILTRATION; PERINEURAL at 08:43

## 2020-03-26 RX ADMIN — LIDOCAINE HYDROCHLORIDE 3 ML: 10; .005 INJECTION, SOLUTION EPIDURAL; INFILTRATION; INTRACAUDAL; PERINEURAL at 08:44

## 2020-03-26 ASSESSMENT — MIFFLIN-ST. JEOR: SCORE: 1469.97

## 2020-03-26 NOTE — PRE-PROCEDURE
GENERAL PRE-PROCEDURE:   Procedure:  Port  Date/Time:  3/26/2020 8:14 AM    Risks and benefits: Risks, benefits and alternatives were discussed    Patient states understanding of procedure being performed: Yes    Patient's understanding of procedure matches consent: Yes    Procedure consent matches procedure scheduled: Yes    Appropriately NPO:  Yes  ASA Class:  Class 2- mild systemic disease, no acute problems, no functional limitations  Lungs:  Lungs clear with good breath sounds bilaterally  Heart:  Normal heart sounds and rate  History & Physical reviewed:  History and physical reviewed and no updates needed  Statement of review:  I have reviewed the lab findings, diagnostic data, medications, and the plan for sedation

## 2020-03-26 NOTE — PROGRESS NOTES
PATIENT WELLNESS SCREENING    Step 1: Answer all screening questions 1-3.    1. In the last month, have you been in contact with someone who was confirmed or suspected to have Coronavirus/COVID-19? No    2. Do you have the following symptoms?   Fever? No   Cough? No   Shortness of breath? No   Skin rash? No    3. Have you traveled internationally in the last month? No   If so, where?     Step 2: Refer to logic grid below for actions    NO SYMPTOM(S)    ACTIONS:  1. Standard rooming process  2. Provider to assess per normal protocol    POSITIVE SYMPTOM(S)  If positive for ANY of the following symptoms: fever, cough, shortness of breath, rash    ACTIONS  1. Mask patient  2. Room patient as soon as possible  3. Don appropriate PPE when entering room  4. Provider evaluation

## 2020-03-26 NOTE — DISCHARGE INSTRUCTIONS
Port Insertion Discharge Instructions     After you go home:      Have an adult stay with you for the first 6 hours    You may resume your normal diet       For 24 hours - due to the sedation you received:    Relax and take it easy    Do NOT make any important or legal decisions    Do NOT drive or operate machines at home or at work    Do NOT drink alcohol    Care of Puncture Sites:      Keep the dressings on your sites clean & dry for 3 days. Change it only if it gets wet or dirty.    You may shower after the dressing comes off in 3 days    Do not remove the small white strips of tape, if present. Allow them to fall off on their own.     You may cover the wound with a bandaid after the dressing is removed if needed for comfort      Activity       Avoid heavy lifting (greater than 10 pounds) or the overuse of your shoulder for 3 days    Bleeding:      If you start bleeding from the incision sites in your chest or neck - or have swelling in your neck, sit down and press on the site for 5-10 minutes.     If bleeding has not stopped after 10 minutes, call your provider.        Call 911 right away if you have heavy bleeding or bleeding that does not stop.      Medicines:      You may resume all medications    Resume your Warfarin/Coumadin at your regular dose today. Follow up with your provider to have your INR rechecked    Resume your Platelet Inhibitors and Aspirin tomorrow at your regular dose    For minor pain, you may take Acetaminophen (Tylenol) or Ibuprofen (Advil)    Call the provider who ordered this procedure if:      You have swelling in your neck or over your port site    The incision area is red, swollen, hot or tender    You have chills or a fever greater than 101 F (38 C)    Any questions or concerns    Call  911 or go to the Emergency Room if you have:      Severe chest pain or trouble breathing    Bleeding that you cannot control    Additional Information:      Your port may be accessed right away.      You will need to have your port flushed every 30 days or after each use.      If you have questions call:          [facility] Radiology Dept @ [phone]      The provider who performed your procedure was Dr. Arenas.

## 2020-03-26 NOTE — PROGRESS NOTES
Care Suites Discharge Nursing Note    Patient Information  Name: Nathalie Bashir  Age: 54 year old    Discharge Education:  Discharge instructions reviewed: Yes  Additional education/resources provided: port pamphlet with ID's given to pt  Patient/patient representative verbalizes understanding: Yes  Patient discharging on new medications: No  Medication education completed: Yes    Discharge Plans:   Discharge location: home  Discharge ride contacted: Yes  Approximate discharge time: 1015    Discharge Criteria:  Discharge criteria met and vital signs stable: Yes  Ambulatory in room without any difficulty.  here to drive pt home. Discharged to home per w/c in stable condition.    Patient Belongs:  Patient belongings returned to patient: Yes    Ludy Hurst RN

## 2020-03-26 NOTE — PRE-PROCEDURE
GENERAL PRE-PROCEDURE:   Procedure:  Port Placement with moderate sedation  Date/Time:  3/26/2020 8:14 AM    Written consent obtained?: Yes    Risks and benefits: Risks, benefits and alternatives were discussed    Consent given by:  Patient  Patient states understanding of procedure being performed: Yes    Patient's understanding of procedure matches consent: Yes    Procedure consent matches procedure scheduled: Yes    Expected level of sedation:  Moderate  Appropriately NPO:  Yes  ASA Class:  Class 2- mild systemic disease, no acute problems, no functional limitations  Mallampati  :  Grade 1- soft palate, uvula, tonsillar pillars, and posterior pharyngeal wall visible  Lungs:  Lungs clear with good breath sounds bilaterally  Heart:  Normal heart sounds and rate  History & Physical reviewed:  History and physical reviewed and no updates needed  Statement of review:  I have reviewed the lab findings, diagnostic data, medications, and the plan for sedation

## 2020-03-26 NOTE — PROGRESS NOTES
Called Nathalie to do a formal chemo teach  On FLOT.    Reviewed administration, side effects (including myelosuppression, nausea/vomiting, diarrhea/constipation, hair loss, mouth sores) and ongoing symptom management by APPs in clinic. Provided phone numbers for triage and after hours care. Highlighted steps to expect when getting chemotherapy (check in, labs, pre- meds, infusion), Discussed that chemo treatment may be delayed a day or two due to blood counts, infusion schedule or patient's need to modify. Included a one page resource of symptoms and when to contact the Cancer Clinic with questions or concerns.     Answered all Nathalie's questions to her stated satisfaction.

## 2020-03-26 NOTE — PROGRESS NOTES
Care Suites Admission Nursing Note    Patient Information  Name: Nathalie Bashir  Age: 54 year old  Reason for admission: port placement  Care Suites arrival time: 0635    Patient Admission/Assessment   Pre-procedure assessment complete: Yes  If abnormal assessment/labs, provider notified: N/A  NPO: Yes  Medications held per instructions/orders: Yes  Consent: obtained  If applicable, pregnancy test status: deferred  Patient oriented to room: Yes  Education/questions answered: Yes  Plan/other: as planned    Discharge Planning  Accompanied by:  Ti will   Discharge name/phone number: Ti [phone]  Overnight post sedation caregiver: Ti  Discharge location: home    Ludy Hurst RN

## 2020-03-27 ENCOUNTER — INFUSION THERAPY VISIT (OUTPATIENT)
Dept: ONCOLOGY | Facility: CLINIC | Age: 55
End: 2020-03-27
Attending: INTERNAL MEDICINE
Payer: MEDICARE

## 2020-03-27 ENCOUNTER — HOME INFUSION (PRE-WILLOW HOME INFUSION) (OUTPATIENT)
Dept: PHARMACY | Facility: CLINIC | Age: 55
End: 2020-03-27

## 2020-03-27 VITALS
SYSTOLIC BLOOD PRESSURE: 98 MMHG | OXYGEN SATURATION: 97 % | WEIGHT: 185.3 LBS | BODY MASS INDEX: 28.17 KG/M2 | TEMPERATURE: 98 F | RESPIRATION RATE: 18 BRPM | HEART RATE: 100 BPM | DIASTOLIC BLOOD PRESSURE: 65 MMHG

## 2020-03-27 DIAGNOSIS — C16.9 GASTRIC ADENOCARCINOMA (H): Primary | ICD-10-CM

## 2020-03-27 LAB
ALBUMIN SERPL-MCNC: 3 G/DL (ref 3.4–5)
ALP SERPL-CCNC: 120 U/L (ref 40–150)
ALT SERPL W P-5'-P-CCNC: 19 U/L (ref 0–50)
ANION GAP SERPL CALCULATED.3IONS-SCNC: 5 MMOL/L (ref 3–14)
AST SERPL W P-5'-P-CCNC: 18 U/L (ref 0–45)
BASOPHILS # BLD AUTO: 0 10E9/L (ref 0–0.2)
BASOPHILS NFR BLD AUTO: 0.2 %
BILIRUB SERPL-MCNC: 0.2 MG/DL (ref 0.2–1.3)
BUN SERPL-MCNC: 12 MG/DL (ref 7–30)
CALCIUM SERPL-MCNC: 8.5 MG/DL (ref 8.5–10.1)
CHLORIDE SERPL-SCNC: 107 MMOL/L (ref 94–109)
CO2 SERPL-SCNC: 27 MMOL/L (ref 20–32)
CREAT SERPL-MCNC: 0.8 MG/DL (ref 0.52–1.04)
DIFFERENTIAL METHOD BLD: ABNORMAL
EOSINOPHIL # BLD AUTO: 0 10E9/L (ref 0–0.7)
EOSINOPHIL NFR BLD AUTO: 0.3 %
ERYTHROCYTE [DISTWIDTH] IN BLOOD BY AUTOMATED COUNT: 14.9 % (ref 10–15)
GFR SERPL CREATININE-BSD FRML MDRD: 84 ML/MIN/{1.73_M2}
GLUCOSE SERPL-MCNC: 147 MG/DL (ref 70–99)
HCT VFR BLD AUTO: 31.7 % (ref 35–47)
HGB BLD-MCNC: 10.4 G/DL (ref 11.7–15.7)
IMM GRANULOCYTES # BLD: 0 10E9/L (ref 0–0.4)
IMM GRANULOCYTES NFR BLD: 0.3 %
LYMPHOCYTES # BLD AUTO: 2 10E9/L (ref 0.8–5.3)
LYMPHOCYTES NFR BLD AUTO: 13.7 %
MCH RBC QN AUTO: 28.4 PG (ref 26.5–33)
MCHC RBC AUTO-ENTMCNC: 32.8 G/DL (ref 31.5–36.5)
MCV RBC AUTO: 87 FL (ref 78–100)
MONOCYTES # BLD AUTO: 0.7 10E9/L (ref 0–1.3)
MONOCYTES NFR BLD AUTO: 4.7 %
NEUTROPHILS # BLD AUTO: 11.6 10E9/L (ref 1.6–8.3)
NEUTROPHILS NFR BLD AUTO: 80.8 %
NRBC # BLD AUTO: 0 10*3/UL
NRBC BLD AUTO-RTO: 0 /100
PLATELET # BLD AUTO: 302 10E9/L (ref 150–450)
POTASSIUM SERPL-SCNC: 3.4 MMOL/L (ref 3.4–5.3)
PROT SERPL-MCNC: 6.9 G/DL (ref 6.8–8.8)
RBC # BLD AUTO: 3.66 10E12/L (ref 3.8–5.2)
SODIUM SERPL-SCNC: 139 MMOL/L (ref 133–144)
WBC # BLD AUTO: 14.4 10E9/L (ref 4–11)

## 2020-03-27 PROCEDURE — 80053 COMPREHEN METABOLIC PANEL: CPT | Performed by: INTERNAL MEDICINE

## 2020-03-27 PROCEDURE — 96368 THER/DIAG CONCURRENT INF: CPT

## 2020-03-27 PROCEDURE — 25000128 H RX IP 250 OP 636: Mod: ZF | Performed by: INTERNAL MEDICINE

## 2020-03-27 PROCEDURE — 85025 COMPLETE CBC W/AUTO DIFF WBC: CPT | Performed by: INTERNAL MEDICINE

## 2020-03-27 PROCEDURE — 96417 CHEMO IV INFUS EACH ADDL SEQ: CPT

## 2020-03-27 PROCEDURE — 96415 CHEMO IV INFUSION ADDL HR: CPT

## 2020-03-27 PROCEDURE — 96413 CHEMO IV INFUSION 1 HR: CPT

## 2020-03-27 PROCEDURE — 96375 TX/PRO/DX INJ NEW DRUG ADDON: CPT

## 2020-03-27 PROCEDURE — G0498 CHEMO EXTEND IV INFUS W/PUMP: HCPCS

## 2020-03-27 PROCEDURE — 25800030 ZZH RX IP 258 OP 636: Mod: ZF | Performed by: INTERNAL MEDICINE

## 2020-03-27 RX ORDER — LORAZEPAM 0.5 MG/1
0.5 TABLET ORAL EVERY 4 HOURS PRN
Qty: 30 TABLET | Refills: 5 | Status: SHIPPED | OUTPATIENT
Start: 2020-03-27 | End: 2020-05-28

## 2020-03-27 RX ORDER — LORAZEPAM 0.5 MG/1
0.5 TABLET ORAL EVERY 4 HOURS PRN
Qty: 30 TABLET | Refills: 7 | Status: CANCELLED | OUTPATIENT
Start: 2020-03-27

## 2020-03-27 RX ORDER — PROCHLORPERAZINE MALEATE 10 MG
10 TABLET ORAL EVERY 6 HOURS PRN
Qty: 30 TABLET | Refills: 7 | Status: CANCELLED | OUTPATIENT
Start: 2020-03-27

## 2020-03-27 RX ORDER — PROCHLORPERAZINE MALEATE 10 MG
10 TABLET ORAL EVERY 6 HOURS PRN
Qty: 30 TABLET | Refills: 7 | Status: SHIPPED | OUTPATIENT
Start: 2020-03-27 | End: 2020-03-31 | Stop reason: ALTCHOICE

## 2020-03-27 RX ORDER — ONDANSETRON 8 MG/1
8 TABLET, FILM COATED ORAL EVERY 8 HOURS PRN
Qty: 10 TABLET | Refills: 7 | Status: CANCELLED | OUTPATIENT
Start: 2020-03-27

## 2020-03-27 RX ORDER — ONDANSETRON 8 MG/1
8 TABLET, FILM COATED ORAL EVERY 8 HOURS PRN
Qty: 10 TABLET | Refills: 7 | Status: SHIPPED | OUTPATIENT
Start: 2020-03-27 | End: 2020-06-10

## 2020-03-27 RX ADMIN — SODIUM CHLORIDE 250 ML: 9 INJECTION, SOLUTION INTRAVENOUS at 08:17

## 2020-03-27 RX ADMIN — DEXTROSE MONOHYDRATE 250 ML: 50 INJECTION, SOLUTION INTRAVENOUS at 10:39

## 2020-03-27 RX ADMIN — DEXAMETHASONE SODIUM PHOSPHATE: 10 INJECTION, SOLUTION INTRAMUSCULAR; INTRAVENOUS at 08:33

## 2020-03-27 RX ADMIN — LEUCOVORIN CALCIUM 400 MG: 200 INJECTION, POWDER, LYOPHILIZED, FOR SOLUTION INTRAMUSCULAR; INTRAVENOUS at 10:42

## 2020-03-27 RX ADMIN — OXALIPLATIN 169 MG: 100 INJECTION, SOLUTION, CONCENTRATE INTRAVENOUS at 10:43

## 2020-03-27 RX ADMIN — DOCETAXEL 100 MG: 20 INJECTION, SOLUTION, CONCENTRATE INTRAVENOUS at 09:21

## 2020-03-27 NOTE — PATIENT INSTRUCTIONS
Contact Numbers  [facility]: [phone]    After Hours:  [phone]  Triage: [phone]    Please call the [facility] Triage line if you experience a temperature greater than or equal to 100.5, shaking chills, have uncontrolled nausea, vomiting and/or diarrhea, dizziness, shortness of breath, chest pain, bleeding, unexplained bruising, or if you have any other new/concerning symptoms, questions or concerns.     If it is after hours, weekends, or holidays, please call the main hospital  at  [phone] and ask to speak to the Oncology doctor on call.     If you are having any concerning symptoms or wish to speak to a provider before your next infusion visit, please call your care coordinator or triage to notify them so we can adequately serve you.     If you need a refill on a narcotic prescription or other medication, please call triage before your infusion appointment.         March 2020 Sunday Monday Tuesday Wednesday Thursday Friday Saturday   1     2     3    Admission   7:23 AM   Bakari Plata MD   [facility], Endoscopy   (Discharge: 3/3/2020)    ESOPHAGOGASTRODUODENOSCOPY, WITH ENDOSCOPIC US   9:30 AM   Bakari Plata MD    GI 4     5     6     7    Admission  12:15 AM   Kathleen Araujo MD   [facility], Emergency Department   (Discharge: 3/7/2020)   8     9     10    Admission   8:55 PM   Mynor Coronel MD   [facility], Emergency Department   (Discharge: 3/10/2020) 11     12     13    PE [facility]/ ONCOLOGY  12:00 PM   (45 min.)   [id number]   [facility] PET CT 14       15     16     17     18     19    [facility] NEW WITH ROOM  11:45 AM   (75 min.)   Rosamaria Hickman GC   [facility] LAB DRAW   1:15 PM   (15 min.)    [facility] LAB DRAW   [facility] Lab Draw    [facility] RETURN   1:45 PM   (30 min.)   Magaly Pollard MD   [facility] 20    INSERTION, VASCULAR ACCESS PORT   9:50 AM   Antonino Gil,  TWAN    OR    Outpatient Visit  11:59 PM   [facility] 21       22     23     24     25     26    Admission   6:13 AM   Tam Herndon MD   [facility] Care Suites   (Discharge: 3/26/2020)    IR CHEST PORT PLACEMENT >5 YRS   6:30 AM   (90 min.)   SHIR1   [facility] Interventional Radiology 27    [facility] ONC INFUSION 360   7:00 AM   (360 min.)    ONCOLOGY INFUSION   [facility] 28       29 30 31 April 2020 Sunday Monday Tuesday Wednesday Thursday Friday Saturday                  1     2     3     4       5     6     7     8     9     10    [facility] DRAW   9:45 AM   (15 min.)    [facility] LAB DRAW   [facility] Lab Draw    [facility] RETURN  10:05 AM   (50 min.)   Vazquez Sewell PA   [facility]    [facility] ONC INFUSION 360  12:00 PM   (360 min.)    ONCOLOGY INFUSION   [facility] 11       12     13     14     15     16     17     18       19     20     21     22     23     24    [facility] DRAW  10:30 AM   (15 min.)    [facility] LAB DRAW   [facility] Lab Draw    [facility] RETURN  10:55 AM   (50 min.)   [facility]az[facility] Sewell [facility]  12:00 PM   (360 min.)    ONCOLOGY INFUSION   [facility] 25       26     27     28     29     30                               Lab Results:  Recent Results (from the past 12 hour(s))   CBC with platelets differential    Collection Time: 03/27/20  7:29 AM   Result Value Ref Range    WBC 14.4 (H) 4.0 - 11.0 10e9/L    RBC Count 3.66 (L) 3.8 - 5.2 10e12/L    Hemoglobin 10.4 (L) 11.7 - 15.7 g/dL    Hematocrit 31.7 (L) 35.0 - 47.0 %    MCV 87 78 - 100 fl    MCH 28.4 26.5 - 33.0 pg    MCHC 32.8 31.5 - 36.5 g/dL    RDW 14.9 10.0 - 15.0 %    Platelet Count 302 150 - 450 10e9/L    Diff Method Automated Method     % Neutrophils 80.8 %    % Lymphocytes 13.7 %     % Monocytes 4.7 %    % Eosinophils 0.3 %    % Basophils 0.2 %    % Immature Granulocytes 0.3 %    Nucleated RBCs 0 0 /100    Absolute Neutrophil 11.6 (H) 1.6 - 8.3 10e9/L    Absolute Lymphocytes 2.0 0.8 - 5.3 10e9/L    Absolute Monocytes 0.7 0.0 - 1.3 10e9/L    Absolute Eosinophils 0.0 0.0 - 0.7 10e9/L    Absolute Basophils 0.0 0.0 - 0.2 10e9/L    Abs Immature Granulocytes 0.0 0 - 0.4 10e9/L    Absolute Nucleated RBC 0.0    Comprehensive metabolic panel    Collection Time: 03/27/20  7:29 AM   Result Value Ref Range    Sodium 139 133 - 144 mmol/L    Potassium 3.4 3.4 - 5.3 mmol/L    Chloride 107 94 - 109 mmol/L    Carbon Dioxide 27 20 - 32 mmol/L    Anion Gap 5 3 - 14 mmol/L    Glucose 147 (H) 70 - 99 mg/dL    Urea Nitrogen 12 7 - 30 mg/dL    Creatinine 0.80 0.52 - 1.04 mg/dL    GFR Estimate 84 >60 mL/min/[1.73_m2]    GFR Estimate If Black >90 >60 mL/min/[1.73_m2]    Calcium 8.5 8.5 - 10.1 mg/dL    Bilirubin Total 0.2 0.2 - 1.3 mg/dL    Albumin 3.0 (L) 3.4 - 5.0 g/dL    Protein Total 6.9 6.8 - 8.8 g/dL    Alkaline Phosphatase 120 40 - 150 U/L    ALT 19 0 - 50 U/L    AST 18 0 - 45 U/L

## 2020-03-27 NOTE — PROGRESS NOTES
Infusion Nursing Note:  Nathalie Bashir presents today for C1D1 Taxotere/Oxaliplatin/Leucovorin/Fluorouracil pump.    Patient seen by provider today: No   present during visit today: Not Applicable.    Note: First time getting chemotherapy today.Chemotherapy teaching, side effects, and schedule reviewed with patient. Pt instructed to call triage (or MD on call if after hours/weekends) with chills/temp >=100.5. Pt stated understanding of plan.     Checked with Niecy (Pharmacy), will give Leucovorin despite there is no Fluorouracil bolus.     Intravenous Access:  Implanted Port.    Treatment Conditions:  Lab Results   Component Value Date    HGB 10.4 03/27/2020     Lab Results   Component Value Date    WBC 14.4 03/27/2020      Lab Results   Component Value Date    ANEU 11.6 03/27/2020     Lab Results   Component Value Date     03/27/2020      Lab Results   Component Value Date     03/27/2020                   Lab Results   Component Value Date    POTASSIUM 3.4 03/27/2020           Lab Results   Component Value Date    MAG 2.2 02/14/2020            Lab Results   Component Value Date    CR 0.80 03/27/2020                   Lab Results   Component Value Date    PETRA 8.5 03/27/2020                Lab Results   Component Value Date    BILITOTAL 0.2 03/27/2020           Lab Results   Component Value Date    ALBUMIN 3.0 03/27/2020                    Lab Results   Component Value Date    ALT 19 03/27/2020           Lab Results   Component Value Date    AST 18 03/27/2020       Results reviewed, labs MET treatment parameters, ok to proceed with treatment.    Infusion:  Continous Infusion of Fluorouracil at 10 ml/hour for 24 hours, double checked with Tessie Trotter RN.    Post Infusion Assessment:    Results reviewed, copy given to patient.  Proceed with treatment.    Prior to discharge: Port is secured in place with tegaderm and flushed with 10cc NS with positive blood return noted.  Continuous home  "infusion CADD pump connected.    All connectors secured in place and clamps taped open.    Pump started, \"running\" noted on display (CADD): *YES.checked with Tessie Trotter RN.  Patient instructed to call our clinic or [facility] Infusion with any questions or concerns at home.  Patient verbalized understanding.    Patient set up for pump disconnect at our clinic on 3/29/20@1100H.      Post Infusion Assessment:  Patient tolerated infusion without incident.  Blood return noted pre and post infusion.  Site patent and intact, free from redness, edema or discomfort.  No evidence of extravasations.  Access kept as per protocol.       Discharge Plan:   Prescription refills given for Ativan, Compazine and Zofran.  Discharge instructions reviewed with: Patient.  Patient and/or family verbalized understanding of discharge instructions and all questions answered.  Copy of AVS reviewed with patient and/or family.  Patient will return 4/10/20 for next appointment.  Patient discharged in stable condition accompanied by: self.  Departure Mode: Ambulatory.  Face to Face time: 10.    DARNELL GARVIN RN                        "

## 2020-03-28 ENCOUNTER — INFUSION THERAPY VISIT (OUTPATIENT)
Dept: ONCOLOGY | Facility: CLINIC | Age: 55
End: 2020-03-28
Attending: PHYSICIAN ASSISTANT
Payer: MEDICARE

## 2020-03-28 VITALS
OXYGEN SATURATION: 97 % | SYSTOLIC BLOOD PRESSURE: 108 MMHG | DIASTOLIC BLOOD PRESSURE: 59 MMHG | TEMPERATURE: 98.3 F | RESPIRATION RATE: 16 BRPM | HEART RATE: 93 BPM

## 2020-03-28 DIAGNOSIS — C16.9 GASTRIC ADENOCARCINOMA (H): Primary | ICD-10-CM

## 2020-03-28 PROCEDURE — 40000114 ZZH STATISTIC NO CHARGE CLINIC VISIT

## 2020-03-28 PROCEDURE — 25000128 H RX IP 250 OP 636: Mod: ZF | Performed by: PHYSICIAN ASSISTANT

## 2020-03-28 RX ORDER — HEPARIN SODIUM (PORCINE) LOCK FLUSH IV SOLN 100 UNIT/ML 100 UNIT/ML
500 SOLUTION INTRAVENOUS ONCE
Status: COMPLETED | OUTPATIENT
Start: 2020-03-28 | End: 2020-03-28

## 2020-03-28 RX ADMIN — Medication 500 UNITS: at 12:49

## 2020-03-28 ASSESSMENT — PAIN SCALES - GENERAL: PAINLEVEL: NO PAIN (0)

## 2020-03-28 NOTE — PROGRESS NOTES
Infusion Nursing Note:  Nathalie Bashir presents today for pump disconnect.    Patient seen by provider today: No   present during visit today: Not Applicable.    Note: Patient has cold sensitivity, but otherwise feels well.  Denies nausea.  Has a baseline runny nose, which has not changed.  Port site is sore.  Advised ice for the discomfort.    Upon arrival pump had 2 more hours to infuse.  Patient waited for remainder of infusion.    Intravenous Access:  Implanted Port.    Post Infusion Assessment:  Patient tolerated infusion without incident.  Blood return noted pre and post infusion.  Site patent and intact, free from redness, edema or discomfort.  No evidence of extravasations.  Access discontinued per protocol.       Discharge Plan:   Patient declined prescription refills.  Discharge instructions reviewed with: Patient.  Patient and/or family verbalized understanding of discharge instructions and all questions answered.  Patient will return 4/10/2020 for next appointment.  Patient discharged in stable condition accompanied by: self.  Departure Mode: Ambulatory.    Mirta Hartley RN

## 2020-03-28 NOTE — PATIENT INSTRUCTIONS
Contact Numbers  [facility]: [phone] (for symptom and scheduling needs)    Please call the [facility] Triage line if you experience a temperature greater than or equal to 100.4, shaking chills, have uncontrolled nausea, vomiting and/or diarrhea, dizziness, shortness of breath, chest pain, bleeding, unexplained bruising, or if you have any other new/concerning symptoms, questions or concerns.     If you are having any concerning symptoms or wish to speak to a provider before your next infusion visit, please call your care coordinator or triage to notify them so we can adequately serve you.     If you need a refill on a narcotic prescription or other medication, please call triage before your infusion appointment.

## 2020-03-30 ENCOUNTER — DOCUMENTATION ONLY (OUTPATIENT)
Dept: ONCOLOGY | Facility: CLINIC | Age: 55
End: 2020-03-30

## 2020-03-30 ENCOUNTER — PATIENT OUTREACH (OUTPATIENT)
Dept: ONCOLOGY | Facility: CLINIC | Age: 55
End: 2020-03-30

## 2020-03-30 DIAGNOSIS — D49.0 IPMN (INTRADUCTAL PAPILLARY MUCINOUS NEOPLASM): ICD-10-CM

## 2020-03-30 DIAGNOSIS — C16.3 MALIGNANT NEOPLASM OF PYLORIC ANTRUM (H): ICD-10-CM

## 2020-03-30 NOTE — PROGRESS NOTES
Nathalie calls in to report the following symptoms:  -Has some abdominal pain which has been going on for a few days, and started before her chemo treatment. Stabbing pressure is how she describes it. She had diarrhea since yesterday afternoon, but took some imodium this morning after having a loose stool. There is one spot that is very tender on the left side that hurts all of the time, and one part near her belly bottom that is sore. She is eating and drinking normally. She had mary lou charms, apple juice, coffee so far today.  -She feels like her ears are buzzing and she is dizzy, but this has been happening the last few days. No vision changes, no headache, feels like she is floating.   -She is tired, achy and fatigued.   -No fever, no shortness of breath, she is urinating, she slept just ok last night.     She and I discussed her eating something with protein, and she is going to have an egg sandwich, and taking a nap, and reconnecting after she wakes up to see how she is feeling. Will continue to follow.    Migdalia Kelley RN

## 2020-03-30 NOTE — PROGRESS NOTES
Nathalie called back. She took a nap from 12pm-2pm and woke up and still feeling unwell. She took omeprazole about an hour ago, and had taken zofran around noon. She had some soda crackers and 7-Up. She had an egg earlier and she didn't feel well after eating it.     Paged Dr. Pollard. She can try tums and compazine and stay as well hydrated as possible . If the pain gets worse or she starts vomiting or spikes a temp she needs to call again. She should let us know how she is doing tomorrow. We went over a plan for her medications and she verbalized understanding. She will update us tomorrow.    Migdalia Kelley RN

## 2020-03-30 NOTE — PROGRESS NOTES
FMLA forms received via pt hand off in clinic from [facility].  Forms to be completed and put in folder for provider to approve.    Upon completion, call pt to see if they would like the forms emailed to then, or sent to pt house.    Ania Willams CMA ([facility])

## 2020-03-31 ENCOUNTER — NURSE TRIAGE (OUTPATIENT)
Dept: NURSING | Facility: CLINIC | Age: 55
End: 2020-03-31

## 2020-03-31 ENCOUNTER — APPOINTMENT (OUTPATIENT)
Dept: CT IMAGING | Facility: CLINIC | Age: 55
End: 2020-03-31
Attending: EMERGENCY MEDICINE
Payer: MEDICARE

## 2020-03-31 ENCOUNTER — HOSPITAL ENCOUNTER (EMERGENCY)
Facility: CLINIC | Age: 55
Discharge: HOME | End: 2020-03-31
Attending: EMERGENCY MEDICINE | Admitting: EMERGENCY MEDICINE
Payer: MEDICARE

## 2020-03-31 VITALS
OXYGEN SATURATION: 100 % | HEART RATE: 87 BPM | WEIGHT: 181.4 LBS | SYSTOLIC BLOOD PRESSURE: 116 MMHG | DIASTOLIC BLOOD PRESSURE: 80 MMHG | RESPIRATION RATE: 16 BRPM | TEMPERATURE: 98.4 F | BODY MASS INDEX: 27.49 KG/M2 | HEIGHT: 68 IN

## 2020-03-31 DIAGNOSIS — R10.12 ABDOMINAL PAIN, LEFT UPPER QUADRANT: ICD-10-CM

## 2020-03-31 DIAGNOSIS — R42 VERTIGO: ICD-10-CM

## 2020-03-31 LAB
ALBUMIN SERPL-MCNC: 3 G/DL (ref 3.4–5)
ALBUMIN UR-MCNC: NEGATIVE MG/DL
ALP SERPL-CCNC: 96 U/L (ref 40–150)
ALT SERPL W P-5'-P-CCNC: 23 U/L (ref 0–50)
ANION GAP SERPL CALCULATED.3IONS-SCNC: 6 MMOL/L (ref 3–14)
APPEARANCE UR: CLEAR
AST SERPL W P-5'-P-CCNC: 15 U/L (ref 0–45)
BASOPHILS # BLD AUTO: 0 10E9/L (ref 0–0.2)
BASOPHILS NFR BLD AUTO: 0.1 %
BILIRUB SERPL-MCNC: 0.3 MG/DL (ref 0.2–1.3)
BILIRUB UR QL STRIP: NEGATIVE
BUN SERPL-MCNC: 15 MG/DL (ref 7–30)
CALCIUM SERPL-MCNC: 8.3 MG/DL (ref 8.5–10.1)
CHLORIDE SERPL-SCNC: 104 MMOL/L (ref 94–109)
CO2 SERPL-SCNC: 26 MMOL/L (ref 20–32)
COLOR UR AUTO: ABNORMAL
CREAT SERPL-MCNC: 0.71 MG/DL (ref 0.52–1.04)
DIFFERENTIAL METHOD BLD: ABNORMAL
EOSINOPHIL # BLD AUTO: 0 10E9/L (ref 0–0.7)
EOSINOPHIL NFR BLD AUTO: 0.4 %
ERYTHROCYTE [DISTWIDTH] IN BLOOD BY AUTOMATED COUNT: 15.1 % (ref 10–15)
GFR SERPL CREATININE-BSD FRML MDRD: >90 ML/MIN/{1.73_M2}
GLUCOSE SERPL-MCNC: 101 MG/DL (ref 70–99)
GLUCOSE UR STRIP-MCNC: NEGATIVE MG/DL
HCG UR QL: NEGATIVE
HCT VFR BLD AUTO: 33.6 % (ref 35–47)
HGB BLD-MCNC: 10.6 G/DL (ref 11.7–15.7)
HGB UR QL STRIP: NEGATIVE
IMM GRANULOCYTES # BLD: 0 10E9/L (ref 0–0.4)
IMM GRANULOCYTES NFR BLD: 0.3 %
KETONES UR STRIP-MCNC: NEGATIVE MG/DL
LEUKOCYTE ESTERASE UR QL STRIP: ABNORMAL
LIPASE SERPL-CCNC: 70 U/L (ref 73–393)
LYMPHOCYTES # BLD AUTO: 1.6 10E9/L (ref 0.8–5.3)
LYMPHOCYTES NFR BLD AUTO: 23.3 %
MAGNESIUM SERPL-MCNC: 2 MG/DL (ref 1.6–2.3)
MCH RBC QN AUTO: 27.5 PG (ref 26.5–33)
MCHC RBC AUTO-ENTMCNC: 31.5 G/DL (ref 31.5–36.5)
MCV RBC AUTO: 87 FL (ref 78–100)
MONOCYTES # BLD AUTO: 0.1 10E9/L (ref 0–1.3)
MONOCYTES NFR BLD AUTO: 1.6 %
MUCOUS THREADS #/AREA URNS LPF: PRESENT /LPF
NEUTROPHILS # BLD AUTO: 5.2 10E9/L (ref 1.6–8.3)
NEUTROPHILS NFR BLD AUTO: 74.3 %
NITRATE UR QL: NEGATIVE
NRBC # BLD AUTO: 0 10*3/UL
NRBC BLD AUTO-RTO: 0 /100
PH UR STRIP: 6.5 PH (ref 5–7)
PHOSPHATE SERPL-MCNC: 3.2 MG/DL (ref 2.5–4.5)
PLATELET # BLD AUTO: 314 10E9/L (ref 150–450)
POTASSIUM SERPL-SCNC: 4.1 MMOL/L (ref 3.4–5.3)
PROT SERPL-MCNC: 6.6 G/DL (ref 6.8–8.8)
RBC # BLD AUTO: 3.85 10E12/L (ref 3.8–5.2)
RBC #/AREA URNS AUTO: <1 /HPF (ref 0–2)
SODIUM SERPL-SCNC: 136 MMOL/L (ref 133–144)
SOURCE: ABNORMAL
SP GR UR STRIP: 1.01 (ref 1–1.03)
SQUAMOUS #/AREA URNS AUTO: 6 /HPF (ref 0–1)
TRANS CELLS #/AREA URNS HPF: <1 /HPF (ref 0–1)
UROBILINOGEN UR STRIP-MCNC: NORMAL MG/DL (ref 0–2)
WBC # BLD AUTO: 7 10E9/L (ref 4–11)
WBC #/AREA URNS AUTO: 2 /HPF (ref 0–5)

## 2020-03-31 PROCEDURE — 93010 ELECTROCARDIOGRAM REPORT: CPT | Mod: Z6 | Performed by: EMERGENCY MEDICINE

## 2020-03-31 PROCEDURE — 25000128 H RX IP 250 OP 636: Performed by: EMERGENCY MEDICINE

## 2020-03-31 PROCEDURE — 99285 EMERGENCY DEPT VISIT HI MDM: CPT | Mod: 25 | Performed by: EMERGENCY MEDICINE

## 2020-03-31 PROCEDURE — 83690 ASSAY OF LIPASE: CPT | Performed by: EMERGENCY MEDICINE

## 2020-03-31 PROCEDURE — 96376 TX/PRO/DX INJ SAME DRUG ADON: CPT | Performed by: EMERGENCY MEDICINE

## 2020-03-31 PROCEDURE — 25000132 ZZH RX MED GY IP 250 OP 250 PS 637: Mod: GY | Performed by: EMERGENCY MEDICINE

## 2020-03-31 PROCEDURE — 85025 COMPLETE CBC W/AUTO DIFF WBC: CPT | Performed by: EMERGENCY MEDICINE

## 2020-03-31 PROCEDURE — 83735 ASSAY OF MAGNESIUM: CPT | Performed by: EMERGENCY MEDICINE

## 2020-03-31 PROCEDURE — 96361 HYDRATE IV INFUSION ADD-ON: CPT | Performed by: EMERGENCY MEDICINE

## 2020-03-31 PROCEDURE — 25800030 ZZH RX IP 258 OP 636: Performed by: EMERGENCY MEDICINE

## 2020-03-31 PROCEDURE — 74177 CT ABD & PELVIS W/CONTRAST: CPT

## 2020-03-31 PROCEDURE — 25000125 ZZHC RX 250: Performed by: EMERGENCY MEDICINE

## 2020-03-31 PROCEDURE — 81025 URINE PREGNANCY TEST: CPT | Performed by: EMERGENCY MEDICINE

## 2020-03-31 PROCEDURE — 93005 ELECTROCARDIOGRAM TRACING: CPT | Performed by: EMERGENCY MEDICINE

## 2020-03-31 PROCEDURE — 96374 THER/PROPH/DIAG INJ IV PUSH: CPT | Performed by: EMERGENCY MEDICINE

## 2020-03-31 PROCEDURE — 96375 TX/PRO/DX INJ NEW DRUG ADDON: CPT | Performed by: EMERGENCY MEDICINE

## 2020-03-31 PROCEDURE — 84100 ASSAY OF PHOSPHORUS: CPT | Performed by: EMERGENCY MEDICINE

## 2020-03-31 PROCEDURE — 80053 COMPREHEN METABOLIC PANEL: CPT | Performed by: EMERGENCY MEDICINE

## 2020-03-31 PROCEDURE — 81001 URINALYSIS AUTO W/SCOPE: CPT | Performed by: EMERGENCY MEDICINE

## 2020-03-31 RX ORDER — HEPARIN SODIUM (PORCINE) LOCK FLUSH IV SOLN 100 UNIT/ML 100 UNIT/ML
5 SOLUTION INTRAVENOUS ONCE
Status: COMPLETED | OUTPATIENT
Start: 2020-03-31 | End: 2020-03-31

## 2020-03-31 RX ORDER — MECLIZINE HYDROCHLORIDE 25 MG/1
25 TABLET ORAL 3 TIMES DAILY PRN
Qty: 30 TABLET | Refills: 0 | Status: ON HOLD | OUTPATIENT
Start: 2020-03-31 | End: 2020-05-23

## 2020-03-31 RX ORDER — METOCLOPRAMIDE HYDROCHLORIDE 5 MG/ML
10 INJECTION INTRAMUSCULAR; INTRAVENOUS ONCE
Status: COMPLETED | OUTPATIENT
Start: 2020-03-31 | End: 2020-03-31

## 2020-03-31 RX ORDER — IOPAMIDOL 755 MG/ML
111 INJECTION, SOLUTION INTRAVASCULAR ONCE
Status: COMPLETED | OUTPATIENT
Start: 2020-03-31 | End: 2020-03-31

## 2020-03-31 RX ORDER — OXYCODONE AND ACETAMINOPHEN 5; 325 MG/1; MG/1
1 TABLET ORAL ONCE
Status: COMPLETED | OUTPATIENT
Start: 2020-03-31 | End: 2020-03-31

## 2020-03-31 RX ORDER — MORPHINE SULFATE 4 MG/ML
4 INJECTION, SOLUTION INTRAMUSCULAR; INTRAVENOUS
Status: COMPLETED | OUTPATIENT
Start: 2020-03-31 | End: 2020-03-31

## 2020-03-31 RX ORDER — ATROPINE SULFATE 0.1 MG/ML
0.4 INJECTION INTRAVENOUS ONCE
Status: DISCONTINUED | OUTPATIENT
Start: 2020-03-31 | End: 2020-03-31

## 2020-03-31 RX ORDER — OXYCODONE HYDROCHLORIDE 5 MG/1
5 TABLET ORAL ONCE
Status: DISCONTINUED | OUTPATIENT
Start: 2020-03-31 | End: 2020-03-31

## 2020-03-31 RX ORDER — METOCLOPRAMIDE 10 MG/1
10 TABLET ORAL
Qty: 120 TABLET | Refills: 0 | Status: SHIPPED | OUTPATIENT
Start: 2020-03-31 | End: 2020-04-20

## 2020-03-31 RX ORDER — OXYCODONE AND ACETAMINOPHEN 5; 325 MG/1; MG/1
1-2 TABLET ORAL EVERY 4 HOURS PRN
Qty: 12 TABLET | Refills: 0 | Status: SHIPPED | OUTPATIENT
Start: 2020-03-31 | End: 2020-04-13

## 2020-03-31 RX ORDER — MECLIZINE HYDROCHLORIDE 25 MG/1
25 TABLET ORAL ONCE
Status: COMPLETED | OUTPATIENT
Start: 2020-03-31 | End: 2020-03-31

## 2020-03-31 RX ADMIN — MORPHINE SULFATE 4 MG: 4 INJECTION INTRAVENOUS at 20:50

## 2020-03-31 RX ADMIN — Medication 5 ML: at 23:35

## 2020-03-31 RX ADMIN — MECLIZINE HYDROCHLORIDE 25 MG: 25 TABLET ORAL at 22:52

## 2020-03-31 RX ADMIN — SODIUM CHLORIDE 1000 ML: 9 INJECTION, SOLUTION INTRAVENOUS at 19:58

## 2020-03-31 RX ADMIN — OXYCODONE HYDROCHLORIDE AND ACETAMINOPHEN 1 TABLET: 5; 325 TABLET ORAL at 23:30

## 2020-03-31 RX ADMIN — METOCLOPRAMIDE 10 MG: 5 INJECTION, SOLUTION INTRAMUSCULAR; INTRAVENOUS at 20:56

## 2020-03-31 RX ADMIN — LIDOCAINE HYDROCHLORIDE 30 ML: 20 SOLUTION ORAL; TOPICAL at 20:21

## 2020-03-31 RX ADMIN — IOPAMIDOL 111 ML: 755 INJECTION, SOLUTION INTRAVENOUS at 21:25

## 2020-03-31 RX ADMIN — MORPHINE SULFATE 4 MG: 4 INJECTION INTRAVENOUS at 22:18

## 2020-03-31 ASSESSMENT — MIFFLIN-ST. JEOR: SCORE: 1476.33

## 2020-03-31 ASSESSMENT — ENCOUNTER SYMPTOMS
COUGH: 0
ABDOMINAL PAIN: 1
SHORTNESS OF BREATH: 0
ROS GI COMMENTS: NEGATIVE FOR HEMATEMESIS
DIARRHEA: 1
HEADACHES: 1
VOMITING: 1
RHINORRHEA: 0
NAUSEA: 1
FEVER: 0
DIZZINESS: 1
BLOOD IN STOOL: 0

## 2020-03-31 ASSESSMENT — VISUAL ACUITY: OU: 1

## 2020-03-31 NOTE — ED PROVIDER NOTES
"ED Provider Note  [facility]      History     Chief Complaint   Patient presents with     Dizziness     The history is provided by the patient and medical records.     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    Patient reports that since 3/28/2020 she has been having diarrhea daily. States that she last had looser stools this afternoon. States that this afternoon she was drinking water and vomited it up. Notes that her abdomen feels \"agitated\" but notes that she has had this pain in the past. She is unsure if this pain is related to her cancer. States this abdominal pain is constant, burning, and sharp. States that this pain is not worse after eating foods. Denies chronic pain from her cancer. Reports that over the past few days she has had gradual onset of a right sided headache. States that she has a history of migraines that are located in this area but notes that this doesn't feel like a migraine. Reports that she also gets dizziness with her migraines in the past. Has had ongoing dizziness that is worse with movement or changing the direction of her head. Has been attempting to stay hydrated. This afternoon patient reports the onset of tinnitus but does note she was told this could be a side affect from her chemotherapy. Denies blood in her stools, hematemesis, fevers, cough, shortness of breath, chest pain, vision changes, ear pain, rhinorrhea, congestion, or dysuria. Denies recent falls where she has hit her head. Patient is not anticoagulated.    Per chart review, patient completed her cycle 1 day 1 chemotherapy infusion of Taxotere/Oxaliplatin/Leucovorin/Fluorouracil on 3/27/2020, 4 days ago.  Patient had a whole-body PET scan " on 3/13/2020.  Patient was evaluated on 3/10/2020 for suspected dehydration after complaining of dizziness, lightheadedness and near syncope.  The patient was discharged after being administered fluids.      Past Medical History  Past Medical History:   Diagnosis Date     Allergic rhinitis      Cancer (H)     stomach cancer     Depression      Lumbago      Migraine      Other chronic pain     low back     Sacroiliitis (H)     low back pain     Trochanteric bursitis     leg length discrrepancy, hip pain     Past Surgical History:   Procedure Laterality Date     ARTHROPLASTY HIP Left 2016     BACK SURGERY      L4-5 decompression     C REPAIR DETACH RETINA,SCLERAL BUCKLE       CATARACT IOL, RT/LT       CHOLECYSTECTOMY N/A 1/28/2020    Procedure: Cholecystectomy;  Surgeon: Yandel Mallory MD;  Location: [facility] OR     ESOPHAGOSCOPY, GASTROSCOPY, DUODENOSCOPY (EGD), COMBINED N/A 3/3/2020    Procedure: ESOPHAGOGASTRODUODENOSCOPY, WITH ENDOSCOPIC US (enoxaparin);  Surgeon: Bakari Plata MD;  Location: [facility] GI     EYE SURGERY       IR CHEST PORT PLACEMENT > 5 YRS OF AGE  3/26/2020     OPEN REDUCTION INTERNAL FIXATION RODDING INTRAMEDULLARY FEMUR Left 12/23/2014    Procedure: OPEN REDUCTION INTERNAL FIXATION RODDING INTRAMEDULLARY FEMUR;  Surgeon: Arnol Santiago MD;  Location: [facility] OR     ORTHOPEDIC SURGERY       PICC DOUBLE LUMEN PLACEMENT Right 02/07/2020    5 fr double lumen 41 cm     REMOVE HARDWARE LOWER EXTREMITY Left 4/7/2015    Procedure: REMOVE HARDWARE LOWER EXTREMITY;  Surgeon: Arnol Santiago MD;  Location: [facility] OR     REMOVE HARDWARE RODDING INTRAMEDULLARY FEMUR Left 12/17/2015    Procedure: REMOVE HARDWARE RODDING INTRAMEDULLARY FEMUR;  Surgeon: Arnol Santiago MD;  [facility]     RESECT BONE LOWER EXTREMITY Left 12/23/2014    Procedure: RESECT BONE LOWER EXTREMITY;  Surgeon: Arnol Santiago MD;  Location: [facility] OR     WHIPPLE PROCEDURE N/A 1/28/2020    Procedure: Open Whipple procedure and  Cholecystectomy;  Surgeon: Yandel Mallory MD;  Location: [facility] OR     amitriptyline (ELAVIL) 25 MG tablet  citalopram (CELEXA) 20 MG tablet  dexamethasone (DECADRON) 4 MG tablet  diclofenac (FLECTOR) 1.3 % patch  fluticasone (FLONASE) 50 MCG/ACT nasal spray  hydrOXYzine (ATARAX) 10 MG tablet  LORazepam (ATIVAN) 0.5 MG tablet  meclizine (ANTIVERT) 25 MG tablet  metoclopramide (REGLAN) 10 MG tablet  ondansetron (ZOFRAN) 8 MG tablet  oxyCODONE-acetaminophen (PERCOCET) 5-325 MG tablet  pantoprazole (PROTONIX) 40 MG EC tablet  calcium carb 1250 mg, 500 mg [place],/vitamin D 200 units (OSCAL WITH D) 500-200 MG-UNIT per tablet  multivitamin, therapeutic with minerals (MULTI-VITAMIN) TABS  SUMAtriptan (IMITREX) 100 MG tablet  tiZANidine (ZANAFLEX) 4 MG tablet      Allergies   Allergen Reactions     Gluten Meal Palpitations     Pregabalin      interfered with Cymbalta     Augmentin Rash     Acetaminophen Nausea and Other (See Comments)     Urine retention       Dust Mite Extract      Gabapentin GI Disturbance     dizziness     Methadone Fatigue     Mold      Naprosyn [Naproxen] GI Disturbance     dizziness     Oxycontin [Oxycodone]      Confusion, loopy, oxycodone is tolerated     Oxymetholone      Seasonal Allergies      Topiramate Other (See Comments)     Tongue numbness, taste alteration, irritable      Latex Rash     Past medical history, past surgical history, medications, and allergies were reviewed with the patient. Additional pertinent items: None    Family History  Family History   Problem Relation Age of Onset     Heart Failure Mother 77     Anesthesia Reaction No family hx of      Deep Vein Thrombosis No family hx of      Family history was reviewed with the patient. Additional pertinent items: None    Social History  Social History     Tobacco Use     Smoking status: Former Smoker     Packs/day: 0.50     Years: 25.00     Pack years: 12.50     Types: Cigarettes     Last attempt to quit: 1/1/2020     Years since  "quittin.2     Smokeless tobacco: Never Used   Substance Use Topics     Alcohol use: No     Drug use: No      Social history was reviewed with the patient. Additional pertinent items: None    Review of Systems   Constitutional: Negative for fever.   HENT: Positive for tinnitus. Negative for congestion, ear pain and rhinorrhea.    Eyes: Negative for visual disturbance.   Respiratory: Negative for cough and shortness of breath.    Cardiovascular: Negative for chest pain.   Gastrointestinal: Positive for abdominal pain, diarrhea, nausea and vomiting. Negative for blood in stool.        Negative for hematemesis   Genitourinary: Negative for dysuria.   Neurological: Positive for dizziness and headaches (Right sided).   All other systems reviewed and are negative.        Physical Exam   BP: 110/64  Pulse: 100  Heart Rate: 91  Temp: 98.2  F (36.8  C)  Resp: 16  Height: 172.7 cm (5' 8\")  Weight: 82.3 kg (181 lb 6.4 oz)  SpO2: 100 %  Physical Exam  Vitals signs and nursing note reviewed.   Constitutional:       General: She is not in acute distress.     Appearance: Normal appearance. She is well-developed and normal weight. She is not ill-appearing or diaphoretic.   HENT:      Head: Normocephalic and atraumatic.      Nose: Nose normal.      Mouth/Throat:      Mouth: Mucous membranes are moist.      Pharynx: Oropharynx is clear.   Eyes:      General: Vision grossly intact. Gaze aligned appropriately. No scleral icterus.     Extraocular Movements: Extraocular movements intact.      Right eye: Normal extraocular motion and no nystagmus.      Left eye: Normal extraocular motion and no nystagmus.      Conjunctiva/sclera: Conjunctivae normal.      Comments: Baseline anisocoria and left pupil irregularity   Neck:      Musculoskeletal: Normal range of motion and neck supple. No neck rigidity.   Cardiovascular:      Rate and Rhythm: Normal rate.   Pulmonary:      Effort: Pulmonary effort is normal. No respiratory distress.      " Breath sounds: No stridor.   Abdominal:      General: Abdomen is protuberant. A surgical scar is present. There is no distension.      Palpations: Abdomen is soft. There is no fluid wave.      Tenderness: There is abdominal tenderness in the periumbilical area and suprapubic area. There is no guarding or rebound.      Hernia: No hernia is present.   Musculoskeletal: Normal range of motion.         General: No deformity or signs of injury.   Skin:     General: Skin is warm and dry.      Coloration: Skin is not jaundiced.      Findings: No rash.   Neurological:      General: No focal deficit present.      Mental Status: She is alert and oriented to person, place, and time.      Cranial Nerves: No cranial nerve deficit, dysarthria or facial asymmetry.      Sensory: Sensation is intact. No sensory deficit.      Motor: Motor function is intact. No weakness, tremor, abnormal muscle tone or seizure activity.      Coordination: Romberg sign negative. Coordination normal. Finger-Nose-Finger Test normal.   Psychiatric:         Mood and Affect: Mood normal.         Behavior: Behavior normal.         Thought Content: Thought content normal.         ED Course     7:39 PM  The patient was seen and examined by Cici Rich MD in Room ED15.     Procedures             EKG Interpretation:      Interpreted by Cici Rich MD  Time reviewed: 8:29 PM  Symptoms at time of EKG: dizziness   Rhythm: normal sinus   Rate: normal  Axis: normal  Ectopy: none  Conduction: normal  ST Segments/ T Waves: No ST-T wave changes  Q Waves: none    Clinical Impression: normal EKG                      Results for orders placed or performed during the hospital encounter of 03/31/20   CT Abdomen Pelvis w Contrast     Status: None    Narrative     EXAMINATION: CT ABDOMEN PELVIS W CONTRAST, 3/31/2020 9:33 PM    TECHNIQUE:  Helical CT images from the lung bases through the  symphysis pubis were obtained with IV contrast. Contrast dose:  iopamidol  (ISOVUE-370) solution 111 mL    COMPARISON: PET CT 3/13/2020, CT abdomen and pelvis 2/10/2020    HISTORY: Nausea, vomiting; Abd pain, gastroenteritis or colitis  suspected; s/p whipple, stomach cancer, newish left sided ap and  diarrhea    FINDINGS:    Abdomen and pelvis: Postoperative changes of Whipple. At the  pancreaticojejunostomy there is streaky soft tissue, with the most  well-defined solid nodular component measuring 18 x 19 mm (series 5  image 118) which was previously FDG avid. New small pneumobilia  centrally and in the left hepatic lobe. Unchanged mild dilation of the  pancreatic duct. The spleen and adrenal glands are normal. The  gallbladder is surgically absent.    Mild right pelvocaliectasis. No hydronephrosis of either kidney. No  cystic or solid renal mass. Urinary bladder is partially obscured by  streak artifact, but normal where visualized.  Left hemipelvis is  obscured due to metallic streak artifact.    No dilated bowel. Colonic diverticulosis. Mild wall thickening of the  left colon, similar to 3/13/2020. Normal appendix. Mild thickening of  the wall of the distal gastric remnant.    Nodularity in the anterior right lower quadrant mesentery, measuring  2.7 x 1.9 cm in conglomeration (series 5 image 165). Portal vein,  splenic vein, superior mesenteric vein are patent. No retroperitoneal  lymphadenopathy or aortic aneurysm.  Scattered non enlarged paraaortic  lymph nodes.    Lung bases: Mild right lung base atelectasis.  Otherwise unremarkable.    Bones and soft tissues: Thickening of the midline surgical scar in the  upper abdomen, which was FDG avid on the PET/CT and not significantly  changed from 3/13/2020. Unchanged left total hip arthroplasty and left  sacroiliac instrumentation. Unchanged lucency in the left sacrum.  Degenerative changes of the spine.      Impression    IMPRESSION:  In this patient with history of gastric cancer with  positive serosal margin status post Whipple:  1. No  acute CT finding. Mild wall thickening of the left colon, which  is unchanged from 3/13/2020 and favored to be due to decompression  rather than colitis.  2. Unchanged soft tissue thickening at the pancreaticojejunostomy  site, which was previously FDG avid. This remains suspicious for  neoplasm.  3. Unchanged nodularity in the anterior right lower quadrant, which is  likely the sequela of the prior fluid collection on 2/10/2020.  4. Unchanged soft tissue thickening of the distal gastric remnant.  Residual disease versus postoperative change.  5. Unchanged soft tissue thickening along the midline laparotomy site,  likely inflammatory.  6. New small pneumobilia. Recommend correlation with recent  instrumentation.  7. Unchanged mild dilation of the pancreatic duct.  8. Nodularity in the anterior right lower quadrant mesentery.   Attention on follow-up.    I have personally reviewed the examination and initial interpretation  and I agree with the findings.    JOHNATHON FARAH MD   CBC with platelets differential     Status: Abnormal   Result Value Ref Range    WBC 7.0 4.0 - 11.0 10e9/L    RBC Count 3.85 3.8 - 5.2 10e12/L    Hemoglobin 10.6 (L) 11.7 - 15.7 g/dL    Hematocrit 33.6 (L) 35.0 - 47.0 %    MCV 87 78 - 100 fl    MCH 27.5 26.5 - 33.0 pg    MCHC 31.5 31.5 - 36.5 g/dL    RDW 15.1 (H) 10.0 - 15.0 %    Platelet Count 314 150 - 450 10e9/L    Diff Method Automated Method     % Neutrophils 74.3 %    % Lymphocytes 23.3 %    % Monocytes 1.6 %    % Eosinophils 0.4 %    % Basophils 0.1 %    % Immature Granulocytes 0.3 %    Nucleated RBCs 0 0 /100    Absolute Neutrophil 5.2 1.6 - 8.3 10e9/L    Absolute Lymphocytes 1.6 0.8 - 5.3 10e9/L    Absolute Monocytes 0.1 0.0 - 1.3 10e9/L    Absolute Eosinophils 0.0 0.0 - 0.7 10e9/L    Absolute Basophils 0.0 0.0 - 0.2 10e9/L    Abs Immature Granulocytes 0.0 0 - 0.4 10e9/L    Absolute Nucleated RBC 0.0    Comprehensive metabolic panel     Status: Abnormal   Result Value Ref Range     Sodium 136 133 - 144 mmol/L    Potassium 4.1 3.4 - 5.3 mmol/L    Chloride 104 94 - 109 mmol/L    Carbon Dioxide 26 20 - 32 mmol/L    Anion Gap 6 3 - 14 mmol/L    Glucose 101 (H) 70 - 99 mg/dL    Urea Nitrogen 15 7 - 30 mg/dL    Creatinine 0.71 0.52 - 1.04 mg/dL    GFR Estimate >90 >60 mL/min/[1.73_m2]    GFR Estimate If Black >90 >60 mL/min/[1.73_m2]    Calcium 8.3 (L) 8.5 - 10.1 mg/dL    Bilirubin Total 0.3 0.2 - 1.3 mg/dL    Albumin 3.0 (L) 3.4 - 5.0 g/dL    Protein Total 6.6 (L) 6.8 - 8.8 g/dL    Alkaline Phosphatase 96 40 - 150 U/L    ALT 23 0 - 50 U/L    AST 15 0 - 45 U/L   Lipase     Status: Abnormal   Result Value Ref Range    Lipase 70 (L) 73 - 393 U/L   UA with Microscopic     Status: Abnormal   Result Value Ref Range    Color Urine Light Yellow     Appearance Urine Clear     Glucose Urine Negative NEG^Negative mg/dL    Bilirubin Urine Negative NEG^Negative    Ketones Urine Negative NEG^Negative mg/dL    Specific Gravity Urine 1.010 1.003 - 1.035    Blood Urine Negative NEG^Negative    pH Urine 6.5 5.0 - 7.0 pH    Protein Albumin Urine Negative NEG^Negative mg/dL    Urobilinogen mg/dL Normal 0.0 - 2.0 mg/dL    Nitrite Urine Negative NEG^Negative    Leukocyte Esterase Urine Small (A) NEG^Negative    Source Clean catch urine     WBC Urine 2 0 - 5 /HPF    RBC Urine <1 0 - 2 /HPF    Squamous Epithelial /HPF Urine 6 (H) 0 - 1 /HPF    Transitional Epi <1 0 - 1 /HPF    Mucous Urine Present (A) NEG^Negative /LPF   HCG qualitative urine (UPT)     Status: None   Result Value Ref Range    HCG Qual Urine Negative NEG^Negative   Magnesium     Status: None   Result Value Ref Range    Magnesium 2.0 1.6 - 2.3 mg/dL   Phosphorus     Status: None   Result Value Ref Range    Phosphorus 3.2 2.5 - 4.5 mg/dL   EKG 12-lead, tracing only     Status: None (Preliminary result)   Result Value Ref Range    Interpretation ECG Click View Image link to view waveform and result      Medications   0.9% sodium chloride BOLUS (0 mLs  Intravenous Stopped 3/31/20 2329)   lidocaine (XYLOCAINE) 2 % 15 mL, alum & mag hydroxide-simethicone (MAALOX  ES) 15 mL GI Cocktail (30 mLs Oral Given 3/31/20 2021)   morphine (PF) injection 4 mg (4 mg Intravenous Given 3/31/20 2050)   metoclopramide (REGLAN) injection 10 mg (10 mg Intravenous Given 3/31/20 2056)   iopamidol (ISOVUE-370) solution 111 mL (111 mLs Intravenous Given 3/31/20 2125)   sodium chloride (PF) 0.9% PF flush 78 mL (78 mLs Intravenous Given 3/31/20 2126)   morphine (PF) injection 4 mg (4 mg Intravenous Given 3/31/20 2218)   meclizine (ANTIVERT) tablet 25 mg (25 mg Oral Given 3/31/20 2252)   oxyCODONE-acetaminophen (PERCOCET) 5-325 MG per tablet 1 tablet (1 tablet Oral Given 3/31/20 2330)   heparin 100 UNIT/ML injection 5 mL (5 mLs Intracatheter Given 3/31/20 2335)        Assessments & Plan (with Medical Decision Making)   Nathalie Bashir is a 54 year old female with a history of signet ring type gastric adenocarcinoma s/p Whipple and cholecystectomy (1/28/2020) for suspected IPMN who presents to the Emergency Department for evaluation of dizziness, right sided headache, diarrhea, abdominal pain and nausea/vomiting.    Ddx: Meniere's, labyrinthitis, chemotherapy side effect/vestibular neuropathy, electrolyte abnormality, dehydration, BPPV, PUD, malignancy related pain, pancreatitis     Patient with mild tenderness in left periumbilical region w/o guarding.  Given GI cocktail without relief of abdominal pain. Pain improved with morphine x2.  CT abd pelvis obtained given high risk comorbidities and surgical hx.      Given IV fluid and Reglan for headache with significant improvement of symptoms.  Mild right-sided headache is similar to patient's chronic migraine headaches which are located in the right temporal region.  Symptoms are more mild than her typical migraines.  No sudden onset of pain.  Low suspicion for CNS lesion however patient is at risk for metastatic CNS lesion with active  malignancy.  Recent PET scan did not show any lesions in the head or neck.  Discussed with oncology fellow who recommended neurology consult to determine appropriate imaging.    Neurology consulted and examined patient in the emergency department.  They felt patient's symptoms were consistent with peripheral vertigo and did not think further imaging was required to rule out a CNS lesion.  Patient's dizziness and vertigo improved with meclizine and IV fluids.  She was able to walk with a steady gait that was somewhat limited by chronic hip pain but consistent with her baseline.    Patient given a dose of Percocet for longer acting pain control.  Spoke with the radiology resident about results of CT scan.  He felt that the small pneumobilia could be a normal finding due to patient's surgically altered anatomy and potential for incompetent sphincter causing reflux of air into the biliary system.  Patient symptoms are not consistent with ascending cholangitis or other intra-abdominal infection.  Her abdominal tenderness is mild and in the left lower quadrant.  No right upper quadrant tenderness.  LFTs and lipase normal.  Additional CT findings were similar to previous scans and consistent with known post surgical and malignant pathology.  Reviewed results of CT scan and labs with oncology fellow.  She did not think empiric antibiotics for potential colitis were warranted given patient's normal white count and lack of fever.  She agreed patient's pain could be from progression of neoplastic disease in the area of the pancreaticojejunostomy site where there is soft tissue thickening.  Patient is currently not on any home narcotic pain medications.  Patient does have a documented history of opioid dependence in her chart.  She sees a pain doctor but is not currently taking any narcotics.  Patient states that she has never had problems with opiate abuse or addiction.  She has not happy with her current pain doctor as she  has been wanting to start her on alternative medicine treatments such as medicinal marijuana.  She is planning on seeking a new pain doctor.  I discussed that I was willing to provide patient with a small course of Percocet for nominal pain which is likely from her malignancy.  I also clarified that patient would not be able to get refills of narcotic medications from the emergency department in the future.  She understood that she would need to follow-up with her oncologist or pain doctor for future narcotic medications.  Patient has a scheduled oncology clinic follow-up appointment for the 10th.  I informed patient that she should call to schedule clinic appointment sooner if she continues to have pain.  Patient discharged with 10 tabs of Percocet and a prescription for meclizine.  Discussed detailed return precautions including worsening headache, signs of stroke, fainting, worsening abdominal pain, bloody stools.  Also advised patient to take a stool softener while she is taking narcotic pain medications.        I have reviewed the nursing notes. I have reviewed the findings, diagnosis, plan and need for follow up with the patient.    Discharge Medication List as of 3/31/2020 11:33 PM      START taking these medications    Details   meclizine (ANTIVERT) 25 MG tablet Take 1 tablet (25 mg) by mouth 3 times daily as needed for dizziness, Disp-30 tablet,R-0, Local Print      oxyCODONE-acetaminophen (PERCOCET) 5-325 MG tablet Take 1-2 tablets by mouth every 4 hours as needed for pain, Disp-12 tablet,R-0, Local Print             Final diagnoses:   Abdominal pain, left upper quadrant   Vertigo     IColby, am serving as a trained medical scribe to document services personally performed by Cici Rich MD, based on the provider's statements to me.   ICici MD, was physically present and have reviewed and verified the accuracy of this note documented by Colby Huynh.  --  Cici Rich MD  Emergency Medicine    [facility], [place], EMERGENCY DEPARTMENT  3/31/2020     Cici Rich MD  04/01/20 0113

## 2020-03-31 NOTE — PROGRESS NOTES
This is a recent snapshot of the patient's [facility] medical record.  For current drug dose and complete information and questions, call [phone] or In Basket pool, [facility] ([id number])  CSN Number:  [id number]

## 2020-03-31 NOTE — PROGRESS NOTES
Pt called back this morning to report she woke up nauseous and took lorazepam at 630 am, zofran at 7 am and compazine at 7:30 am. She has not vomited, did have cereal with milk and some water today. She continues to have nausea, heartburn and same abd pain as reported yesterday. Has not had any stools yet today. State she feels slightly dizzy, denied dry mouth or being thirsty. No fevers. She has taken her tums and protonix this morning, state heartburn is mildly relieved. Paged Dr. Pollard.    Per Dr. Pollard: if symptoms are not worse, continue with nausea meds as prescribed. Pt was on reglan before, if she remembers this working better she can switch back to reglan but stop compazine (only take one or the other not both). Call back if abd pain is worsening. Above information relayed to pt, she does want to try the Reglan again, verbalized understanding she should not be taking compazine. Advised to start electrolyte drinks. Erx sent to [facility]. Will continue to monitor pain and call back if it worsens.

## 2020-03-31 NOTE — TELEPHONE ENCOUNTER
"Nathalie reports dizziness, \"feels wobbly, loopy and off balance\" and has ringing on her ears when she stands. Had chemotherapy on Friday 3/27. She has been trying to drink more fluids but has not helped. Denies nausea, has not vomited. No fever.     PCP Dr. Pollard ([facility])    RN paged on call provider for Dr. Hudson, via answering service at 5:30 pm. Per Dr. Hudson, patient needs to be seen at ED for evaluation of dehydration/dizziness. Otherwise, if she feels better, may monitor overnight and schedule an urgent clinic visit tomorrow.    6:05 PM - FNA called patient twice, left non-detailed voicemail to call clinic back.    Alejandra Koroma RN/[place] [facility]      Reason for Disposition    [1] Drinking very little AND [2] dehydration suspected (e.g., no urine > 12 hours, very dry mouth, very lightheaded)    Additional Information    Negative: Severe difficulty breathing (e.g., struggling for each breath, speaks in single words)    Negative: [1] Difficulty breathing or swallowing AND [2] started suddenly after medicine, an allergic food or bee sting    Negative: Shock suspected (e.g., cold/pale/clammy skin, too weak to stand, low BP, rapid pulse)    Negative: Difficult to awaken or acting confused (e.g., disoriented, slurred speech)    Negative: [1] Weakness (i.e., paralysis, loss of muscle strength) of the face, arm or leg on one side of the body AND [2] sudden onset AND [3] present now    Negative: [1] Numbness (i.e., loss of sensation) of the face, arm or leg on one side of the body AND [2] sudden onset AND [3] present now    Negative: [1] Loss of speech or garbled speech AND [2] sudden onset AND [3] present now    Negative: Overdose (accidental or intentional) of medications    Negative: [1] Fainted > 15 minutes ago AND [2] still feels too weak or dizzy to stand    Negative: Heart beating < 50 beats per minute OR > 140 beats per minute    Negative: Sounds like a life-threatening emergency to the " "triager    Negative: Difficulty breathing    Negative: SEVERE dizziness (e.g., unable to stand, requires support to walk, feels like passing out now)    Negative: Extra heart beats OR irregular heart beating  (i.e., \"palpitations\")    Protocols used: DIZZINESS - [id number]      "

## 2020-03-31 NOTE — TELEPHONE ENCOUNTER
"Nathalie is returning a call from EL LEMUS RN.    Per RN's note, notified patient of:  \"Per Dr. Hudson, patient needs to be seen at ED for evaluation of dehydration/dizziness. Otherwise, if she feels better, may monitor overnight and schedule an urgent clinic visit tomorrow.\"    Nathalie will proceed to ER.    6:30 pm - spoke to charge nurse REBECCA lBood at [facility] ER. Notified of patient en route for evaluation of dehydration/dizziness per advice of on-call provider Dr. Tristan Slaughter RN  [place] [facility]    Reason for Disposition    [1] Follow-up call to recent contact AND [2] information only call, no triage required    Protocols used: INFORMATION ONLY CALL-A-AH      "

## 2020-03-31 NOTE — ED AVS SNAPSHOT
[facility], [place], Emergency Department  [address] [address]  Phone:  [phone]                                    Nathalie Bashir   MRN: [id number]    Department:  [facility], Emergency Department   Date of Visit:  3/31/2020           After Visit Summary Signature Page    I have received my discharge instructions, and my questions have been answered. I have discussed any challenges I see with this plan with the nurse or doctor.    ..........................................................................................................................................  Patient/Patient Representative Signature      ..........................................................................................................................................  Patient Representative Print Name and Relationship to Patient    ..................................................               ................................................  Date                                   Time    ..........................................................................................................................................  Reviewed by Signature/Title    ...................................................              ..............................................  Date                                               Time          22EPIC Rev 08/18

## 2020-04-01 LAB — INTERPRETATION ECG - MUSE: NORMAL

## 2020-04-01 ASSESSMENT — ENCOUNTER SYMPTOMS: DYSURIA: 0

## 2020-04-01 NOTE — ED TRIAGE NOTES
54 year old female s/p Whipple procedure in January of 2020 for stomach cancer along with first round of chemo on 03/27/20, presents with complaints of dizziness, tinitis, and affected gait. Patient also notes some left sided abdominal pain.

## 2020-04-01 NOTE — DISCHARGE INSTRUCTIONS
Please make an appointment to follow up with Hematology Oncology Clinic (phone: [phone]) in 2-3 days as needed.    Take meclizine for dizziness.     Take percocet for severe pain. Do not drive or drink alcohol while taking this medication. This is a sedating drug and may cause you to be off balance and at risk for falling especially when taken with other sedating medications. Use only as needed, and with caution. You should also take a stool softener while you are on this medication to counteract the side effect of constipation.    Return to the ED for worsening pain, falling, fainting, vision changes, slurred speech, facial droop, other signs of a stroke, recurrent vomiting or diarrhea, blood in our stool, dehydration, fever, or abdominal distention and inability to pass gas from below.

## 2020-04-01 NOTE — PROGRESS NOTES
FMLA forms for  filled out and placed in provider folder for approval and signature.    Astrid Couch, CMA

## 2020-04-01 NOTE — CONSULTS
[facility]  Neurology Consultation  Patient Name: Nathalie Bashir  MRN: [id number]  : 1965  PCP: Marianne Gonzalez  Attending provider:   Admission Date: 3/31/2020  Date of Service: 2020    The Neurology consultation service was asked to see Nathalie Bashir to evaluate Dizziness.    Assessment:  Nathalie Bashir is a 54 year old female with a history of signet ring type gastric adenocarcinoma s/p Whipple and cholecystectomy (2020) for suspected IPMN who presents to the Emergency Department for evaluation of dizziness, right sided headache, diarrhea, abdominal pain and nausea/vomiting. Per chart review, patient completed her cycle 1 day 1 chemotherapy infusion of Taxotere/Oxaliplatin/Leucovorin/Fluorouracil on 3/27/2020, 4 days ago. The patient developed sever diarrhea and nausea for at least 2 days after receiving the chemotherapy 4 days ago. underwent whole body PET scan that did not show hypermetabolic focal lesion in head and neck, chest and lower extremities. Her symptoms are positional and less likely to be central in origin especially with normal neuro exam and whole body PET scan 2 weeks ago.     Recommendations:  - IV fluid is recommended to treat dehydration   -Orthostatic vital signs    Patient was seen and discussed with Dr. Diaz.    Jefry Magallon [facility]  Neurology PGY3  [facility]  General Neurology Consult pager: [phone]    HPI:  Information prior to consult: Nathalie Bashir is a 54 year old female with a history of signet ring type gastric adenocarcinoma s/p Whipple and cholecystectomy (2020) for suspected IPMN who presents to the Emergency Department for evaluation of dizziness, right sided headache, diarrhea, abdominal pain and nausea/vomiting. Per chart review, patient completed her cycle 1 day 1 chemotherapy infusion of Taxotere/Oxaliplatin/Leucovorin/Fluorouracil on 3/27/2020, 4 days ago.  Patient had a whole-body PET  scan on 3/13/2020.  Patient was evaluated on 3/10/2020 for suspected dehydration after complaining of dizziness, lightheadedness and near syncope.  The patient was discharged after being administered fluids.    Information learned by Neurology:  The patient developed sever diarrhea and nausea for at least 2 days after receiving the chemotherapy 4 days ago. Yesterday she thought she was more fatigued and having lightheadedness, she thought she was dehydrated thus she started to drink more fluid, today she woke up with more vertiginous  dizziness that gets worse with standing. And she was feeling nausea as well. It is worth mentioning that the patient underwent whole body PET scan that did not show hypermetabolic focal lesion in head and neck, chest and lower extremities. In the ED the patient received IV fluid and she felt better afterwards.     ROS: negative except as per HPI.    PMH:  Past Medical History:   Diagnosis Date     Allergic rhinitis      Cancer (H)     stomach cancer     Depression      Lumbago      Migraine      Other chronic pain     low back     Sacroiliitis (H)     low back pain     Trochanteric bursitis     leg length discrrepancy, hip pain       PSH:  Past Surgical History:   Procedure Laterality Date     ARTHROPLASTY HIP Left 2016     BACK SURGERY      L4-5 decompression     C REPAIR DETACH RETINA,SCLERAL BUCKLE       CATARACT IOL, RT/LT       CHOLECYSTECTOMY N/A 1/28/2020    Procedure: Cholecystectomy;  Surgeon: Yandel Mallory MD;  [facility]     ESOPHAGOSCOPY, GASTROSCOPY, DUODENOSCOPY (EGD), COMBINED N/A 3/3/2020    Procedure: ESOPHAGOGASTRODUODENOSCOPY, WITH ENDOSCOPIC US (enoxaparin);  Surgeon: Bakari Plata MD;  Location: [facility] GI     EYE SURGERY       IR CHEST PORT PLACEMENT > 5 YRS OF AGE  3/26/2020     OPEN REDUCTION INTERNAL FIXATION RODDING INTRAMEDULLARY FEMUR Left 12/23/2014    Procedure: OPEN REDUCTION INTERNAL FIXATION RODDING INTRAMEDULLARY FEMUR;  Surgeon: Arnol Santiago  MD Kyle;  Location: [facility] OR     ORTHOPEDIC SURGERY       PICC DOUBLE LUMEN PLACEMENT Right 02/07/2020    5 fr double lumen 41 cm     REMOVE HARDWARE LOWER EXTREMITY Left 4/7/2015    Procedure: REMOVE HARDWARE LOWER EXTREMITY;  Surgeon: Arnol Santiago MD;  Location[facility] OR     REMOVE HARDWARE RODDING INTRAMEDULLARY FEMUR Left 12/17/2015    Procedure: REMOVE HARDWARE RODDING INTRAMEDULLARY FEMUR;  Surgeon: Arnol Santiago MD;  Location: [facility] OR     RESECT BONE LOWER EXTREMITY Left 12/23/2014    Procedure: RESECT BONE LOWER EXTREMITY;  Surgeon: Arnol Santiago MD;  [facility]     WHIPPLE PROCEDURE N/A 1/28/2020    Procedure: Open Whipple procedure and Cholecystectomy;  Surgeon: Yandel Mallory MD;  [facility]       Medications:  No current facility-administered medications for this encounter.      Current Outpatient Medications   Medication     amitriptyline (ELAVIL) 25 MG tablet     citalopram (CELEXA) 20 MG tablet     dexamethasone (DECADRON) 4 MG tablet     diclofenac (FLECTOR) 1.3 % patch     fluticasone (FLONASE) 50 MCG/ACT nasal spray     hydrOXYzine (ATARAX) 10 MG tablet     LORazepam (ATIVAN) 0.5 MG tablet     meclizine (ANTIVERT) 25 MG tablet     metoclopramide (REGLAN) 10 MG tablet     ondansetron (ZOFRAN) 8 MG tablet     oxyCODONE-acetaminophen (PERCOCET) 5-325 MG tablet     pantoprazole (PROTONIX) 40 MG EC tablet     calcium carb 1250 mg, 500 mg [place],/vitamin D 200 units (OSCAL WITH D) 500-200 MG-UNIT per tablet     multivitamin, therapeutic with minerals (MULTI-VITAMIN) TABS     SUMAtriptan (IMITREX) 100 MG tablet     tiZANidine (ZANAFLEX) 4 MG tablet       Allergies:  Allergies   Allergen Reactions     Gluten Meal Palpitations     Pregabalin      interfered with Cymbalta     Augmentin Rash     Acetaminophen Nausea and Other (See Comments)     Urine retention       Dust Mite Extract      Gabapentin GI Disturbance     dizziness     Methadone Fatigue     Mold      Naprosyn [Naproxen] GI  Disturbance     dizziness     Oxycontin [Oxycodone]      Confusion, loopy, oxycodone is tolerated     Oxymetholone      Seasonal Allergies      Topiramate Other (See Comments)     Tongue numbness, taste alteration, irritable      Latex Rash       Social History:  Social History     Socioeconomic History     Marital status:      Spouse name: Not on file     Number of children: Not on file     Years of education: Not on file     Highest education level: Not on file   Occupational History     Not on file   Social Needs     Financial resource strain: Not on file     Food insecurity     Worry: Not on file     Inability: Not on file     Transportation needs     Medical: Not on file     Non-medical: Not on file   Tobacco Use     Smoking status: Former Smoker     Packs/day: 0.50     Years: 25.00     Pack years: 12.50     Types: Cigarettes     Last attempt to quit: 2020     Years since quittin.2     Smokeless tobacco: Never Used   Substance and Sexual Activity     Alcohol use: No     Drug use: No     Sexual activity: Not on file   Lifestyle     Physical activity     Days per week: Not on file     Minutes per session: Not on file     Stress: Not on file   Relationships     Social connections     Talks on phone: Not on file     Gets together: Not on file     Attends Denominational service: Not on file     Active member of club or organization: Not on file     Attends meetings of clubs or organizations: Not on file     Relationship status: Not on file     Intimate partner violence     Fear of current or ex partner: Not on file     Emotionally abused: Not on file     Physically abused: Not on file     Forced sexual activity: Not on file   Other Topics Concern     Parent/sibling w/ CABG, MI or angioplasty before 65F 55M? Not Asked   Social History Narrative     Not on file       Family History:  Family History   Problem Relation Age of Onset     Heart Failure Mother 77     Anesthesia Reaction No family hx of      Deep  Vein Thrombosis No family hx of        Physical Examination:  Vitals: Temp: 98.4  F (36.9  C) Temp src: Oral BP: 116/80 Pulse: 87 Heart Rate: 84 Resp: 16 SpO2: 100 % O2 Device: None (Room air)    General: adult, NAD, cooperative  Neuro:   Mental status: alert and oriented to person, place, and time; language is fluent with intact comprehension and naming   Cranial nerves: PERRL, EOMI with intact smooth pursuit, full visual fields, fundi within normal limits, no facial asymmetry or ptosis, facial sensation intact and symmetric, hearing intact to conversation, tongue and uvula are midline, no hypophonia, no dysarthria   Motor: No abnormal posture, tone, atrophy, or movements/fasciculations.   RIGHT LEFT   Neck flexion 5 5    5 5   FDI 5 5   2nd/3rd digit flexion 5 5   2nd/3rd digit extension 5 5   4th/5th digit flexion 5 5   4th/5th digit extension 5 5   Opponens pollicis 5 5   Biceps 5 5   Triceps 5 5   Deltoid 5 5   Hip flexion 5 5   Knee extension 5 5   Knee flexion 5 5   Dorsiflexion 5 5   Plantar flexion 5 5    Reflexes: - Babinski. - Powell.   RIGHT LEFT   Brachioradialis 2+ 2+   Biceps 2+ 2+   Triceps 2+ 2+   Patellar 2+ 2+   Achilles 2+ 2+    Sensory: Intact to light touch, pin prick, and proprioception in all extremities without extinction. Romberg exam within normal limits.   Coordination: No dysmetria. No dysdiadochokinesia. No ataxia.   Gait: she uses a can when she walks. Normal width, stride length, turn, and arm swing.    Image Studies:  Recent Results (from the past 24 hour(s))   CT Abdomen Pelvis w Contrast    Narrative     EXAMINATION: CT ABDOMEN PELVIS W CONTRAST, 3/31/2020 9:33 PM    TECHNIQUE:  Helical CT images from the lung bases through the  symphysis pubis were obtained with IV contrast. Contrast dose:  iopamidol (ISOVUE-370) solution 111 mL    COMPARISON: PET CT 3/13/2020, CT abdomen and pelvis 2/10/2020    HISTORY: Nausea, vomiting; Abd pain, gastroenteritis or colitis  suspected; s/p  whipple, stomach cancer, newish left sided ap and  diarrhea    FINDINGS:    Abdomen and pelvis: Postoperative changes of Whipple. At the  pancreaticojejunostomy there is streaky soft tissue, with the most  well-defined solid nodular component measuring 18 x 19 mm (series 5  image 118) which was previously FDG avid. New small pneumobilia  centrally and in the left hepatic lobe. Unchanged mild dilation of the  pancreatic duct. The spleen and adrenal glands are normal. The  gallbladder is surgically absent.    Mild right pelvocaliectasis. No hydronephrosis of either kidney. No  cystic or solid renal mass. Urinary bladder is partially obscured by  streak artifact, but normal where visualized.  Left hemipelvis is  obscured due to metallic streak artifact.    No dilated bowel. Colonic diverticulosis. Mild wall thickening of the  left colon, similar to 3/13/2020. Normal appendix. Mild thickening of  the wall of the distal gastric remnant.    Nodularity in the anterior right lower quadrant mesentery, measuring  2.7 x 1.9 cm in conglomeration (series 5 image 165). Portal vein,  splenic vein, superior mesenteric vein are patent. No retroperitoneal  lymphadenopathy or aortic aneurysm.  Scattered non enlarged paraaortic  lymph nodes.    Lung bases: Mild right lung base atelectasis.  Otherwise unremarkable.    Bones and soft tissues: Thickening of the midline surgical scar in the  upper abdomen, which was FDG avid on the PET/CT and not significantly  changed from 3/13/2020. Unchanged left total hip arthroplasty and left  sacroiliac instrumentation. Unchanged lucency in the left sacrum.  Degenerative changes of the spine.      Impression    IMPRESSION:  In this patient with history of gastric cancer with  positive serosal margin status post Whipple:  1. No acute CT finding. Mild wall thickening of the left colon, which  is unchanged from 3/13/2020 and favored to be due to decompression  rather than colitis.  2. Unchanged soft  tissue thickening at the pancreaticojejunostomy  site, which was previously FDG avid. This remains suspicious for  neoplasm.  3. Unchanged nodularity in the anterior right lower quadrant, which is  likely the sequela of the prior fluid collection on 2/10/2020.  4. Unchanged soft tissue thickening of the distal gastric remnant.  Residual disease versus postoperative change.  5. Unchanged soft tissue thickening along the midline laparotomy site,  likely inflammatory.  6. New small pneumobilia. Recommend correlation with recent  instrumentation.  7. Unchanged mild dilation of the pancreatic duct.  8. Nodularity in the anterior right lower quadrant mesentery.   Attention on follow-up.    I have personally reviewed the examination and initial interpretation  and I agree with the findings.    JOHNATHON FARAH MD       Laboratory Studies:  Whole body PET scan 3/13/2020  IMPRESSION: In this patient with history of Whipple surgery with  gastric cancer and positive margins, the current scan shows:     1. Soft tissue streaky density with intense FDG uptake adjacent to the  pancreaticojejunostomy, concerning for neoplastic etiology.  2. Enhancement with increased FDG along the common bile duct, likely  inflammatory.  3. Mild increased FDG uptake in the gastric remnant, may represent  postsurgical change versus residual disease.  4. Focal area of soft tissue thickening with fatty streakiness along  median laparotomy with increased FDG uptake, likely inflammatory.  5. Increased FDG uptake in the thoracic esophagus, which may be seen  with esophagitis.  6. No hypermetabolic focal lesion in head and neck, chest and lower  extremities.  7. Postsurgical changes of Whipple surgery with prominence of main  pancreatic duct in the pancreatic remnant.      Jefry Magallon  Neurology resident   Pager: [phone]

## 2020-04-02 LAB — LAB SCANNED RESULT: NORMAL

## 2020-04-07 ENCOUNTER — TELEPHONE (OUTPATIENT)
Dept: ONCOLOGY | Facility: CLINIC | Age: 55
End: 2020-04-07

## 2020-04-07 ENCOUNTER — ANCILLARY PROCEDURE (OUTPATIENT)
Dept: GENERAL RADIOLOGY | Facility: CLINIC | Age: 55
End: 2020-04-07
Attending: PHYSICIAN ASSISTANT
Payer: MEDICARE

## 2020-04-07 ENCOUNTER — ONCOLOGY VISIT (OUTPATIENT)
Dept: ONCOLOGY | Facility: CLINIC | Age: 55
End: 2020-04-07
Attending: INTERNAL MEDICINE
Payer: MEDICARE

## 2020-04-07 ENCOUNTER — APPOINTMENT (OUTPATIENT)
Dept: LAB | Facility: CLINIC | Age: 55
End: 2020-04-07
Attending: NURSE PRACTITIONER
Payer: MEDICARE

## 2020-04-07 VITALS
SYSTOLIC BLOOD PRESSURE: 102 MMHG | DIASTOLIC BLOOD PRESSURE: 67 MMHG | RESPIRATION RATE: 16 BRPM | TEMPERATURE: 99.3 F | HEART RATE: 113 BPM | OXYGEN SATURATION: 98 %

## 2020-04-07 DIAGNOSIS — R50.81 NEUTROPENIC FEVER (H): Primary | ICD-10-CM

## 2020-04-07 DIAGNOSIS — R10.13 ABDOMINAL PAIN, EPIGASTRIC: ICD-10-CM

## 2020-04-07 DIAGNOSIS — R50.9 FEVER, UNSPECIFIED FEVER CAUSE: Primary | ICD-10-CM

## 2020-04-07 DIAGNOSIS — J18.9 PNEUMONIA OF LEFT LUNG DUE TO INFECTIOUS ORGANISM, UNSPECIFIED PART OF LUNG: ICD-10-CM

## 2020-04-07 DIAGNOSIS — R50.9 FEVER, UNSPECIFIED FEVER CAUSE: ICD-10-CM

## 2020-04-07 DIAGNOSIS — D70.9 NEUTROPENIC FEVER (H): Primary | ICD-10-CM

## 2020-04-07 DIAGNOSIS — C16.3 MALIGNANT NEOPLASM OF PYLORIC ANTRUM (H): ICD-10-CM

## 2020-04-07 DIAGNOSIS — D49.0 IPMN (INTRADUCTAL PAPILLARY MUCINOUS NEOPLASM): ICD-10-CM

## 2020-04-07 LAB
ALBUMIN SERPL-MCNC: 3.1 G/DL (ref 3.4–5)
ALBUMIN UR-MCNC: NEGATIVE MG/DL
ALP SERPL-CCNC: 92 U/L (ref 40–150)
ALT SERPL W P-5'-P-CCNC: 20 U/L (ref 0–50)
ANION GAP SERPL CALCULATED.3IONS-SCNC: 7 MMOL/L (ref 3–14)
ANISOCYTOSIS BLD QL SMEAR: SLIGHT
APPEARANCE UR: CLEAR
AST SERPL W P-5'-P-CCNC: 10 U/L (ref 0–45)
BASOPHILS # BLD AUTO: 0 10E9/L (ref 0–0.2)
BASOPHILS NFR BLD AUTO: 0.9 %
BILIRUB SERPL-MCNC: 0.3 MG/DL (ref 0.2–1.3)
BILIRUB UR QL STRIP: NEGATIVE
BUN SERPL-MCNC: 12 MG/DL (ref 7–30)
CALCIUM SERPL-MCNC: 8.4 MG/DL (ref 8.5–10.1)
CHLORIDE SERPL-SCNC: 106 MMOL/L (ref 94–109)
CO2 SERPL-SCNC: 25 MMOL/L (ref 20–32)
COLOR UR AUTO: YELLOW
CREAT SERPL-MCNC: 0.84 MG/DL (ref 0.52–1.04)
DIFFERENTIAL METHOD BLD: ABNORMAL
EOSINOPHIL # BLD AUTO: 0 10E9/L (ref 0–0.7)
EOSINOPHIL NFR BLD AUTO: 0.8 %
ERYTHROCYTE [DISTWIDTH] IN BLOOD BY AUTOMATED COUNT: 15.3 % (ref 10–15)
GFR SERPL CREATININE-BSD FRML MDRD: 79 ML/MIN/{1.73_M2}
GLUCOSE SERPL-MCNC: 106 MG/DL (ref 70–99)
GLUCOSE UR STRIP-MCNC: NEGATIVE MG/DL
HCT VFR BLD AUTO: 33.1 % (ref 35–47)
HGB BLD-MCNC: 10.9 G/DL (ref 11.7–15.7)
HGB UR QL STRIP: NEGATIVE
KETONES UR STRIP-MCNC: NEGATIVE MG/DL
LEUKOCYTE ESTERASE UR QL STRIP: NEGATIVE
LYMPHOCYTES # BLD AUTO: 3 10E9/L (ref 0.8–5.3)
LYMPHOCYTES NFR BLD AUTO: 60 %
MCH RBC QN AUTO: 28 PG (ref 26.5–33)
MCHC RBC AUTO-ENTMCNC: 32.9 G/DL (ref 31.5–36.5)
MCV RBC AUTO: 85 FL (ref 78–100)
MONOCYTES # BLD AUTO: 0.6 10E9/L (ref 0–1.3)
MONOCYTES NFR BLD AUTO: 12.2 %
NEUTROPHILS # BLD AUTO: 1.3 10E9/L (ref 1.6–8.3)
NEUTROPHILS NFR BLD AUTO: 26.1 %
NITRATE UR QL: NEGATIVE
PH UR STRIP: 6 PH (ref 5–7)
PLATELET # BLD AUTO: 329 10E9/L (ref 150–450)
PLATELET # BLD EST: ABNORMAL 10*3/UL
POTASSIUM SERPL-SCNC: 3.2 MMOL/L (ref 3.4–5.3)
PROT SERPL-MCNC: 6.7 G/DL (ref 6.8–8.8)
RBC # BLD AUTO: 3.89 10E12/L (ref 3.8–5.2)
SODIUM SERPL-SCNC: 137 MMOL/L (ref 133–144)
SOURCE: NORMAL
SP GR UR STRIP: 1.01 (ref 1–1.03)
UROBILINOGEN UR STRIP-MCNC: 0 MG/DL (ref 0–2)
WBC # BLD AUTO: 5 10E9/L (ref 4–11)

## 2020-04-07 PROCEDURE — 81003 URINALYSIS AUTO W/O SCOPE: CPT | Performed by: PHYSICIAN ASSISTANT

## 2020-04-07 PROCEDURE — 36592 COLLECT BLOOD FROM PICC: CPT | Performed by: PHYSICIAN ASSISTANT

## 2020-04-07 PROCEDURE — 87040 BLOOD CULTURE FOR BACTERIA: CPT | Performed by: PHYSICIAN ASSISTANT

## 2020-04-07 PROCEDURE — G0463 HOSPITAL OUTPT CLINIC VISIT: HCPCS

## 2020-04-07 PROCEDURE — 80053 COMPREHEN METABOLIC PANEL: CPT | Performed by: PHYSICIAN ASSISTANT

## 2020-04-07 PROCEDURE — 99214 OFFICE O/P EST MOD 30 MIN: CPT | Mod: ZP | Performed by: PHYSICIAN ASSISTANT

## 2020-04-07 PROCEDURE — 85025 COMPLETE CBC W/AUTO DIFF WBC: CPT | Performed by: PHYSICIAN ASSISTANT

## 2020-04-07 PROCEDURE — 36415 COLL VENOUS BLD VENIPUNCTURE: CPT

## 2020-04-07 RX ORDER — SUCRALFATE ORAL 1 G/10ML
1 SUSPENSION ORAL 4 TIMES DAILY
Qty: 420 ML | Refills: 0 | Status: SHIPPED | OUTPATIENT
Start: 2020-04-07 | End: 2020-04-20

## 2020-04-07 RX ORDER — PANTOPRAZOLE SODIUM 40 MG/1
40 TABLET, DELAYED RELEASE ORAL 2 TIMES DAILY
Qty: 60 TABLET | Refills: 1 | Status: SHIPPED | OUTPATIENT
Start: 2020-04-07 | End: 2020-07-03

## 2020-04-07 RX ORDER — LEVOFLOXACIN 750 MG/1
750 TABLET, FILM COATED ORAL DAILY
Qty: 7 TABLET | Refills: 0 | Status: SHIPPED | OUTPATIENT
Start: 2020-04-07 | End: 2020-04-13

## 2020-04-07 ASSESSMENT — PAIN SCALES - GENERAL: PAINLEVEL: SEVERE PAIN (6)

## 2020-04-07 NOTE — TELEPHONE ENCOUNTER
Pt calling to report temp 100.4. Had chemo infusion on 3/27/20. Also reports abd cramping and diarrhea that is off and on. Sun had liquid stools all day. Yesterday and today x 1 episode. Is nauseated. No vomiting.   To see Caron Gonzalez NP at 3:30 today with labs prior.SSCE, UA, C-diff, bld cx, CMP, CBC    Message sent to scheduling. Pt notified of time.

## 2020-04-07 NOTE — NURSING NOTE
"Oncology Rooming Note    April 7, 2020 3:29 PM   Nathalie Bashir is a 54 year old female who presents for:    Chief Complaint   Patient presents with     Oncology Clinic Visit     Return: Gastric CA      Initial Vitals: LMP 09/23/2014  Estimated body mass index is 27.58 kg/m  as calculated from the following:    Height as of 3/31/20: 1.727 m (5' 8\").    Weight as of 3/31/20: 82.3 kg (181 lb 6.4 oz). There is no height or weight on file to calculate BSA.  Data Unavailable Comment: Data Unavailable   Patient's last menstrual period was 09/23/2014.  Allergies reviewed: Yes  Medications reviewed: Yes    Medications: MEDICATION REFILLS NEEDED TODAY. Provider was notified.  Pharmacy name entered into [email or web address]: [id number] [place] [address]    Clinical concerns: Dexamethasone refill needed        Ivanna Lancaster CMA              "

## 2020-04-07 NOTE — NURSING NOTE
Chief Complaint   Patient presents with     Oncology Clinic Visit     Return: Gastric CA      Port Draw     Labs drawn via PORT by RN in lab. VS taken.      Fany Ayon RN

## 2020-04-07 NOTE — PROGRESS NOTES
Hematology-Oncology Visit  Apr 7, 2020    Reason for Visit: add-on fever and abdominal pain     HPI: Nathalie Bashir is a 54 year old female with poorly differentiated signet ring gastric adenocarcinoma, HER-2 negative. This was found incidentally upon Whipple surgery specimen done for presumed pancreatic IPMN.  It was a pT4aN0 lesion with lymphovascular invasion and perineural invasion seen.  All 22 lymph nodes were negative. EUS which was done on 3/3/2020 did not show any convincing evidence of malignancy. Left gastric lymph node fine-needle aspiration was negative for carcinoma. Biopsy from the gastric jejunal anastomosis as well as lesser curvature of the stomach also did not show any malignancy.   She had a PET/CT on 3/13/2020 which showed soft tissue streaky density with increased FDG uptake adjacent to the pancreaticojejunostomy which could be neoplastic in origin.  Other findings were likely reactive. No other evidence of metastatic disease. She met with Dr. Pollard on 3/19/20 who recommended FLOT chemotherapy. Cycle 1 given 3/27/20.     Interval History: Dena has a few concerns today. She felt like she had a fever last night with chills and took her temperature this afternoon and had a fever of 100.4. She has had a dry cough the past few days. She denies any SOB. No CP. She had 5-6 episodes of diarrhea on Sunday. Took 1 tab of Imodium. Monday had 3 episodes, no Imodium needed. Today she has had one semi-loose BM. She denies any cramping pain. She has had LUQ/epigastric pain for a week which is worse post-prandially or prior to eating. There is a burning sensation to this. She has tried TUMs without much improvement. She is on pantoprazole 40 mg daily at baseline. She went to ED for this on 3/31. She had a CT showing no acute finding and was discharged with Percocet. She denies any n/v. Urine is a little dark without burning or odor.     Current Outpatient Medications   Medication     amitriptyline (ELAVIL) 25  MG tablet     calcium carb 1250 mg, 500 mg [place],/vitamin D 200 units (OSCAL WITH D) 500-200 MG-UNIT per tablet     citalopram (CELEXA) 20 MG tablet     dexamethasone (DECADRON) 4 MG tablet     diclofenac (FLECTOR) 1.3 % patch     fluticasone (FLONASE) 50 MCG/ACT nasal spray     hydrOXYzine (ATARAX) 10 MG tablet     LORazepam (ATIVAN) 0.5 MG tablet     meclizine (ANTIVERT) 25 MG tablet     metoclopramide (REGLAN) 10 MG tablet     multivitamin, therapeutic with minerals (MULTI-VITAMIN) TABS     ondansetron (ZOFRAN) 8 MG tablet     oxyCODONE-acetaminophen (PERCOCET) 5-325 MG tablet     pantoprazole (PROTONIX) 40 MG EC tablet     SUMAtriptan (IMITREX) 100 MG tablet     tiZANidine (ZANAFLEX) 4 MG tablet     No current facility-administered medications for this visit.        PHYSICAL EXAM:  /67   Pulse 113   Temp 99.3  F (37.4  C) (Oral)   Resp 16   LMP 09/23/2014   SpO2 98%   General: Alert, oriented, pleasant, NAD. Nontoxic appearing   HEENT: Normocephalic, atraumatic, PERRLA, EOMI. Moist mucus membranes, no lesions or thrush  Neck: No cervical or supraclavicular LAD.  Lungs: Cracks in L mid lobe, rhonchi with coughing. Normal work of breathing   Cardiac: Mild tachycardia RR  Abdomen: Soft, tender to LUQ/epigastrium, nondistended. Normoactive bowel sounds.  Neuro: CNII-XII grossly intact  Extremities: No pedal edema    Labs:    4/7/2020 15:50   Sodium 137   Potassium 3.2 (L)   Chloride 106   Carbon Dioxide 25   Urea Nitrogen 12   Creatinine 0.84   GFR Estimate 79   GFR Estimate If Black >90   Calcium 8.4 (L)   Anion Gap 7   Albumin 3.1 (L)   Protein Total 6.7 (L)   Bilirubin Total 0.3   Alkaline Phosphatase 92   ALT 20   AST 10   Glucose 106 (H)   WBC 5.0   Hemoglobin 10.9 (L)   Hematocrit 33.1 (L)   Platelet Count 329   RBC Count 3.89   MCV 85   MCH 28.0   MCHC 32.9   RDW 15.3 (H)   Diff Method Manual Differential   % Neutrophils 26.1   % Lymphocytes 60.0   % Monocytes 12.2   % Eosinophils 0.8   % Basophils  0.9   Absolute Neutrophil 1.3 (L)   Absolute Lymphocytes 3.0   Absolute Monocytes 0.6   Absolute Eosinophils 0.0   Absolute Basophils 0.0   Anisocytosis Slight   Platelet Estimate Confirming automated cell count   BLOOD CULTURE Rpt      4/7/2020 16:13   Color Urine Yellow   Appearance Urine Clear   Glucose Urine Negative   Bilirubin Urine Negative   Ketones Urine Negative   Specific Gravity Urine 1.011   pH Urine 6.0   Protein Albumin Urine Negative   Urobilinogen mg/dL 0.0   Nitrite Urine Negative   Blood Urine Negative   Leukocyte Esterase Urine Negative   Source Midstream Urine     Imaging: CXR 2 view   IMPRESSION: Subtle interstitial streaking in left midlung, this could  represent inflammation or possible infection. Clearing of previous  right lung markings.    Assessment & Plan:     1. Fever, cough: CXR shows some streaking in left midlung. No hypoxia or increased work of breathing. Her ANC is 1300 and will probably downtrend the next few days. Anticipate overall duration of severe neutropenia to be short (<7 days). Temp here was 99.3. UA looks okay. BC was drawn and is pending. Will start outpatient management with Levaquin 750 mg x 7 days. Monitor temperature daily and call if temp is still >100.4 after 24 hours on antibiotic. Since source is found, I will not send her for COVID testing but this should be reconsidered if she does not improve quickly.     2. Epigastric/LUQ pain: Seems very consistent with gastritis. She was on high dose dexamethasone with chemotherapy. Increase pantoprazole to 40 mg BID. Start carafate QID PRN. Avoid offensive foods.     3. Diarrhea: Now resolved. Probably from chemotherapy. Will hold off on testing for infectious sources for now.     4. Hypokalemia: K is 3.2 secondary to diarrhea. Discussed drinking 1 bottle of Gatorade over the next few days.     Greater than 25 minutes was spent with this patient with greater than 50% spent in counseling and coordination of care.      June Walsh PA-C    [facility] [address] [id number] [phone]

## 2020-04-07 NOTE — LETTER
4/7/2020      RE: Nathalie Bashir  [address] [facility] [address]       Hematology-Oncology Visit  Apr 7, 2020    Reason for Visit: add-on fever and abdominal pain     HPI: Nathalie Bashir is a 54 year old female with poorly differentiated signet ring gastric adenocarcinoma, HER-2 negative. This was found incidentally upon Whipple surgery specimen done for presumed pancreatic IPMN.  It was a pT4aN0 lesion with lymphovascular invasion and perineural invasion seen.  All 22 lymph nodes were negative. EUS which was done on 3/3/2020 did not show any convincing evidence of malignancy. Left gastric lymph node fine-needle aspiration was negative for carcinoma. Biopsy from the gastric jejunal anastomosis as well as lesser curvature of the stomach also did not show any malignancy.   She had a PET/CT on 3/13/2020 which showed soft tissue streaky density with increased FDG uptake adjacent to the pancreaticojejunostomy which could be neoplastic in origin.  Other findings were likely reactive. No other evidence of metastatic disease. She met with Dr. Pollard on 3/19/20 who recommended FLOT chemotherapy. Cycle 1 given 3/27/20.     Interval History: Dena has a few concerns today. She felt like she had a fever last night with chills and took her temperature this afternoon and had a fever of 100.4. She has had a dry cough the past few days. She denies any SOB. No CP. She had 5-6 episodes of diarrhea on Sunday. Took 1 tab of Imodium. Monday had 3 episodes, no Imodium needed. Today she has had one semi-loose BM. She denies any cramping pain. She has had LUQ/epigastric pain for a week which is worse post-prandially or prior to eating. There is a burning sensation to this. She has tried TUMs without much improvement. She is on pantoprazole 40 mg daily at baseline. She went to ED for this on 3/31. She had a CT showing no acute finding and was discharged with Percocet. She denies any n/v. Urine is a little dark without burning or odor.      Current Outpatient Medications   Medication     amitriptyline (ELAVIL) 25 MG tablet     calcium carb 1250 mg, 500 mg [place],/vitamin D 200 units (OSCAL WITH D) 500-200 MG-UNIT per tablet     citalopram (CELEXA) 20 MG tablet     dexamethasone (DECADRON) 4 MG tablet     diclofenac (FLECTOR) 1.3 % patch     fluticasone (FLONASE) 50 MCG/ACT nasal spray     hydrOXYzine (ATARAX) 10 MG tablet     LORazepam (ATIVAN) 0.5 MG tablet     meclizine (ANTIVERT) 25 MG tablet     metoclopramide (REGLAN) 10 MG tablet     multivitamin, therapeutic with minerals (MULTI-VITAMIN) TABS     ondansetron (ZOFRAN) 8 MG tablet     oxyCODONE-acetaminophen (PERCOCET) 5-325 MG tablet     pantoprazole (PROTONIX) 40 MG EC tablet     SUMAtriptan (IMITREX) 100 MG tablet     tiZANidine (ZANAFLEX) 4 MG tablet     No current facility-administered medications for this visit.        PHYSICAL EXAM:  /67   Pulse 113   Temp 99.3  F (37.4  C) (Oral)   Resp 16   LMP 09/23/2014   SpO2 98%   General: Alert, oriented, pleasant, NAD. Nontoxic appearing   HEENT: Normocephalic, atraumatic, PERRLA, EOMI. Moist mucus membranes, no lesions or thrush  Neck: No cervical or supraclavicular LAD.  Lungs: Cracks in L mid lobe, rhonchi with coughing. Normal work of breathing   Cardiac: Mild tachycardia RR  Abdomen: Soft, tender to LUQ/epigastrium, nondistended. Normoactive bowel sounds.  Neuro: CNII-XII grossly intact  Extremities: No pedal edema    Labs:    4/7/2020 15:50   Sodium 137   Potassium 3.2 (L)   Chloride 106   Carbon Dioxide 25   Urea Nitrogen 12   Creatinine 0.84   GFR Estimate 79   GFR Estimate If Black >90   Calcium 8.4 (L)   Anion Gap 7   Albumin 3.1 (L)   Protein Total 6.7 (L)   Bilirubin Total 0.3   Alkaline Phosphatase 92   ALT 20   AST 10   Glucose 106 (H)   WBC 5.0   Hemoglobin 10.9 (L)   Hematocrit 33.1 (L)   Platelet Count 329   RBC Count 3.89   MCV 85   MCH 28.0   MCHC 32.9   RDW 15.3 (H)   Diff Method Manual Differential   % Neutrophils  26.1   % Lymphocytes 60.0   % Monocytes 12.2   % Eosinophils 0.8   % Basophils 0.9   Absolute Neutrophil 1.3 (L)   Absolute Lymphocytes 3.0   Absolute Monocytes 0.6   Absolute Eosinophils 0.0   Absolute Basophils 0.0   Anisocytosis Slight   Platelet Estimate Confirming automated cell count   BLOOD CULTURE Rpt      4/7/2020 16:13   Color Urine Yellow   Appearance Urine Clear   Glucose Urine Negative   Bilirubin Urine Negative   Ketones Urine Negative   Specific Gravity Urine 1.011   pH Urine 6.0   Protein Albumin Urine Negative   Urobilinogen mg/dL 0.0   Nitrite Urine Negative   Blood Urine Negative   Leukocyte Esterase Urine Negative   Source Midstream Urine     Imaging: CXR 2 view   IMPRESSION: Subtle interstitial streaking in left midlung, this could  represent inflammation or possible infection. Clearing of previous  right lung markings.    Assessment & Plan:     1. Fever, cough: CXR shows some streaking in left midlung. No hypoxia or increased work of breathing. Her ANC is 1300 and will probably downtrend the next few days. Anticipate overall duration of severe neutropenia to be short (<7 days). Temp here was 99.3. UA looks okay. BC was drawn and is pending. Will start outpatient management with Levaquin 750 mg x 7 days. Monitor temperature daily and call if temp is still >100.4 after 24 hours on antibiotic. Since source is found, I will not send her for COVID testing but this should be reconsidered if she does not improve quickly.     2. Epigastric/LUQ pain: Seems very consistent with gastritis. She was on high dose dexamethasone with chemotherapy. Increase pantoprazole to 40 mg BID. Start carafate QID PRN. Avoid offensive foods.     3. Diarrhea: Now resolved. Probably from chemotherapy. Will hold off on testing for infectious sources for now.     4. Hypokalemia: K is 3.2 secondary to diarrhea. Discussed drinking 1 bottle of Gatorade over the next few days.     Greater than 25 minutes was spent with this  patient with greater than 50% spent in counseling and coordination of care.     June Walsh PA-C    [facility] [address] [address]  [phone]                              June Walsh PA-C

## 2020-04-13 ENCOUNTER — ANCILLARY PROCEDURE (OUTPATIENT)
Dept: CT IMAGING | Facility: CLINIC | Age: 55
End: 2020-04-13
Attending: PHYSICIAN ASSISTANT
Payer: MEDICARE

## 2020-04-13 ENCOUNTER — ONCOLOGY VISIT (OUTPATIENT)
Dept: ONCOLOGY | Facility: CLINIC | Age: 55
End: 2020-04-13
Attending: PHYSICIAN ASSISTANT
Payer: MEDICARE

## 2020-04-13 ENCOUNTER — APPOINTMENT (OUTPATIENT)
Dept: LAB | Facility: CLINIC | Age: 55
End: 2020-04-13
Attending: INTERNAL MEDICINE
Payer: MEDICARE

## 2020-04-13 ENCOUNTER — TELEPHONE (OUTPATIENT)
Dept: ONCOLOGY | Facility: CLINIC | Age: 55
End: 2020-04-13

## 2020-04-13 VITALS
SYSTOLIC BLOOD PRESSURE: 107 MMHG | BODY MASS INDEX: 28.08 KG/M2 | OXYGEN SATURATION: 97 % | DIASTOLIC BLOOD PRESSURE: 52 MMHG | RESPIRATION RATE: 18 BRPM | TEMPERATURE: 98.1 F | HEART RATE: 96 BPM | HEIGHT: 68 IN | WEIGHT: 185.3 LBS

## 2020-04-13 DIAGNOSIS — C16.9 GASTRIC ADENOCARCINOMA (H): ICD-10-CM

## 2020-04-13 DIAGNOSIS — R10.11 RUQ ABDOMINAL PAIN: ICD-10-CM

## 2020-04-13 DIAGNOSIS — R10.11 RUQ ABDOMINAL PAIN: Primary | ICD-10-CM

## 2020-04-13 DIAGNOSIS — C16.9 GASTRIC ADENOCARCINOMA (H): Primary | ICD-10-CM

## 2020-04-13 DIAGNOSIS — G89.29 OTHER CHRONIC PAIN: ICD-10-CM

## 2020-04-13 LAB
ALBUMIN SERPL-MCNC: 2.6 G/DL (ref 3.4–5)
ALP SERPL-CCNC: 111 U/L (ref 40–150)
ALT SERPL W P-5'-P-CCNC: 25 U/L (ref 0–50)
ANION GAP SERPL CALCULATED.3IONS-SCNC: 4 MMOL/L (ref 3–14)
AST SERPL W P-5'-P-CCNC: 22 U/L (ref 0–45)
BACTERIA SPEC CULT: NO GROWTH
BASOPHILS # BLD AUTO: 0.1 10E9/L (ref 0–0.2)
BASOPHILS NFR BLD AUTO: 0.9 %
BILIRUB SERPL-MCNC: 0.1 MG/DL (ref 0.2–1.3)
BUN SERPL-MCNC: 10 MG/DL (ref 7–30)
CALCIUM SERPL-MCNC: 8 MG/DL (ref 8.5–10.1)
CHLORIDE SERPL-SCNC: 111 MMOL/L (ref 94–109)
CO2 SERPL-SCNC: 28 MMOL/L (ref 20–32)
CREAT SERPL-MCNC: 0.81 MG/DL (ref 0.52–1.04)
DIFFERENTIAL METHOD BLD: ABNORMAL
EOSINOPHIL # BLD AUTO: 0 10E9/L (ref 0–0.7)
EOSINOPHIL NFR BLD AUTO: 0.5 %
ERYTHROCYTE [DISTWIDTH] IN BLOOD BY AUTOMATED COUNT: 15.5 % (ref 10–15)
GFR SERPL CREATININE-BSD FRML MDRD: 83 ML/MIN/{1.73_M2}
GLUCOSE SERPL-MCNC: 115 MG/DL (ref 70–99)
HCT VFR BLD AUTO: 31.9 % (ref 35–47)
HGB BLD-MCNC: 10.1 G/DL (ref 11.7–15.7)
IMM GRANULOCYTES # BLD: 0.1 10E9/L (ref 0–0.4)
IMM GRANULOCYTES NFR BLD: 1.7 %
LYMPHOCYTES # BLD AUTO: 3 10E9/L (ref 0.8–5.3)
LYMPHOCYTES NFR BLD AUTO: 39.7 %
MCH RBC QN AUTO: 27.4 PG (ref 26.5–33)
MCHC RBC AUTO-ENTMCNC: 31.7 G/DL (ref 31.5–36.5)
MCV RBC AUTO: 86 FL (ref 78–100)
MONOCYTES # BLD AUTO: 0.8 10E9/L (ref 0–1.3)
MONOCYTES NFR BLD AUTO: 10.1 %
NEUTROPHILS # BLD AUTO: 3.6 10E9/L (ref 1.6–8.3)
NEUTROPHILS NFR BLD AUTO: 47.1 %
NRBC # BLD AUTO: 0 10*3/UL
NRBC BLD AUTO-RTO: 0 /100
PLATELET # BLD AUTO: 313 10E9/L (ref 150–450)
POTASSIUM SERPL-SCNC: 3.7 MMOL/L (ref 3.4–5.3)
PROT SERPL-MCNC: 6.4 G/DL (ref 6.8–8.8)
RBC # BLD AUTO: 3.69 10E12/L (ref 3.8–5.2)
SODIUM SERPL-SCNC: 142 MMOL/L (ref 133–144)
SPECIMEN SOURCE: NORMAL
WBC # BLD AUTO: 7.6 10E9/L (ref 4–11)

## 2020-04-13 PROCEDURE — 85025 COMPLETE CBC W/AUTO DIFF WBC: CPT | Performed by: PHYSICIAN ASSISTANT

## 2020-04-13 PROCEDURE — 36591 DRAW BLOOD OFF VENOUS DEVICE: CPT

## 2020-04-13 PROCEDURE — G0463 HOSPITAL OUTPT CLINIC VISIT: HCPCS | Mod: ZF

## 2020-04-13 PROCEDURE — 99214 OFFICE O/P EST MOD 30 MIN: CPT | Mod: ZP | Performed by: PHYSICIAN ASSISTANT

## 2020-04-13 PROCEDURE — 25000128 H RX IP 250 OP 636: Mod: ZF | Performed by: PHYSICIAN ASSISTANT

## 2020-04-13 PROCEDURE — 80053 COMPREHEN METABOLIC PANEL: CPT | Performed by: PHYSICIAN ASSISTANT

## 2020-04-13 RX ORDER — HEPARIN SODIUM (PORCINE) LOCK FLUSH IV SOLN 100 UNIT/ML 100 UNIT/ML
5 SOLUTION INTRAVENOUS ONCE
Status: COMPLETED | OUTPATIENT
Start: 2020-04-13 | End: 2020-04-13

## 2020-04-13 RX ORDER — TRAMADOL HYDROCHLORIDE 50 MG/1
50 TABLET ORAL EVERY 6 HOURS PRN
Qty: 15 TABLET | Refills: 0 | Status: SHIPPED | OUTPATIENT
Start: 2020-04-13 | End: 2020-04-20

## 2020-04-13 RX ORDER — IOPAMIDOL 755 MG/ML
114 INJECTION, SOLUTION INTRAVASCULAR ONCE
Status: COMPLETED | OUTPATIENT
Start: 2020-04-13 | End: 2020-04-13

## 2020-04-13 RX ADMIN — HEPARIN SODIUM (PORCINE) LOCK FLUSH IV SOLN 100 UNIT/ML 5 ML: 100 SOLUTION at 14:57

## 2020-04-13 RX ADMIN — IOPAMIDOL 114 ML: 755 INJECTION, SOLUTION INTRAVASCULAR at 14:51

## 2020-04-13 RX ADMIN — Medication 5 ML: at 13:14

## 2020-04-13 ASSESSMENT — PAIN SCALES - GENERAL: PAINLEVEL: EXTREME PAIN (8)

## 2020-04-13 ASSESSMENT — MIFFLIN-ST. JEOR: SCORE: 1494.02

## 2020-04-13 NOTE — NURSING NOTE
"Oncology Rooming Note    April 13, 2020 1:30 PM   Nathalie Bashir is a 54 year old female who presents for:    Chief Complaint   Patient presents with     Port Draw     labs drawn via port by RN in lab     Oncology Clinic Visit     RETURN VISIT; GASTRIC ADENOCARCINOMA      Initial Vitals: /52 (BP Location: Left arm, Patient Position: Chair, Cuff Size: Adult Regular)   Pulse 96   Temp 98.1  F (36.7  C) (Oral)   Resp 18   Ht 1.727 m (5' 8\")   Wt 84.1 kg (185 lb 4.8 oz)   LMP 09/23/2014   SpO2 97%   BMI 28.17 kg/m   Estimated body mass index is 28.17 kg/m  as calculated from the following:    Height as of this encounter: 1.727 m (5' 8\").    Weight as of this encounter: 84.1 kg (185 lb 4.8 oz). Body surface area is 2.01 meters squared.  Extreme Pain (8) Comment: Data Unavailable   Patient's last menstrual period was 09/23/2014.  Allergies reviewed: Yes  Medications reviewed: Yes    Medications: Medication refills not needed today.  Pharmacy name entered into [facility]: [id number] [place] [id number] [facility]    Clinical concerns: Patient complains of having upper abdominal pain that is constant. Patient rates pain 8 out of 10 right now. Patient states that her bowel movements are normal and everyday.  Lisa Valencia was notified.      Cathy St              "

## 2020-04-13 NOTE — PROGRESS NOTES
Oncology follow up visit:  Date on this visit: Apr 13, 2020    Reason for visit: f/u of gastric cancer, add on visit to evaluate acute abdominal pain    Primary Physician: Marianne Gonzalez     History Of Present Illness:  Ms. Bashir is a 54 year old female with a history of gastric adenocarcinoma.  She was being followed for a pancreatic duct dilatation and pancreatic cyst which was noted in November 2018.  Since May 2019 she started noticing some nausea and vomiting and occasional abdominal discomfort.  She had an ultrasound in August 2019 which was essentially unremarkable.  She had a repeat endoscopic ultrasound on 10/29/2019 which showed increase in size of the cyst as well as dilation of the main pancreatic duct.  FNA and fluid analysis showed mucinous epithelium.  She was referred to Dr. 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  Surprisingly there was no pancreatic ductal neoplasm seen but on the resected specimen stomach cancer was noted.  It was poorly differentiated adenocarcinoma with poorly cohesive/signet ring cell type with proximal gastric mucosal and serosal margins being positive.  There was lymphovascular and perineural invasion seen.  22 lymph nodes were sampled and all were benign.  It was a pT4aN0 lesion.  HER-2/christine FISH was not amplified.  Because of the surprising finding she was referred to medical oncology.  Prior to that she was seen by Dr. Mallory who recommended and arranged further restaging procedure with EUS and a PET scan but because of recent surgery, the timing of the PET scan was moved forward so that it is at least 6 weeks out from the surgery so that it does not interfere with postsurgical inflammation.      EUS on 3/3/2020 showed:     Postop changes consistent with prior non-pylorus                        sparing Whipple resection. Some mucosal nodularity along                        the lesser curvature with scattered exudate. By                         ultrasound this appears to correspond to a staple or                        suture line. No gross evidence of neoplasm, however the                        previous neoplasm was infiltrative and may not be                        readily seen endoscopically.                        Non-specific loss of sonographic wall layers along the                        lesser curvature in this region adjacent to apparent                        suture. This is potentially consistent with                        post-operative edema, however it is not possible to                        completely exclude infiltrative tumor in this region.                        There was no target for needle aspiration (wall 3 mm in                        this region). Forceops biopsies obtained from this                        region and separately along the gastrojejunal                        anastomosis.                        - Two prominent left gastric artery lymph nodes. Needle                        aspiration performed and preliminary cytology suggested                        benign lymphoid material.                        - No liver lesions seen in the visualized portion                        (limited by post-op changes).     Left gastric lymph node fine-needle aspiration was negative for carcinoma.  Biopsy from the gastric jejunal anastomosis as well as lesser curvature of the stomach also did not show any malignancy.    She had a PET/CT on 3/13/2020 which showed soft tissue streaky density with increased FDG uptake adjacent to the pancreaticojejunostomy which could be neoplastic in origin.  There is mild increased FDG uptake in the gastric remnant.  Focal area of soft tissue thickening with fatty streakiness along medial laparotomy with increased FDG uptake which likely is inflammatory.  Increased FDG uptake in thoracic esophagus which could be esophagitis. She started on chemotherapy with 5FU, oxaliplatin, and Taxotere on  3/27/20. She was seen on 4/7/20 for evaluation of fevers. She was treated for possible pneumonia with Levaquin.     Interval History/EOS:  Dena has noted 5 days of RUQ pain, 8/10, dull and sharp at times, constant pain. Getting worse. Worse with movement. Not worse with meals. Nothing makes it better. Tried APAP. Taking pantoprazole BID, Carafate QID, and Reglan QID. No jaundice. She doesn't have a gall bladder. She denies having this type of pain before. The LUQ pain has gotten significantly better with Carafate and modifying her diet. No fevers or chills and cough has improved. She has completed the course of Levaquin.     She called surgery first but she was told to follow up with us.     10 pt ROS otherwise negative.     Current Medications:  Current Outpatient Medications   Medication Sig Dispense Refill     amitriptyline (ELAVIL) 25 MG tablet Take 3 tablets by mouth At Bedtime       calcium carb 1250 mg, 500 mg [place],/vitamin D 200 units (OSCAL WITH D) 500-200 MG-UNIT per tablet Take 1 tablet by mouth 3 times daily (with meals)       citalopram (CELEXA) 20 MG tablet Take 20 mg by mouth every morning        dexamethasone (DECADRON) 4 MG tablet Take 2 tablets (8 mg) by mouth 2 times daily (with meals) Start the evening PRIOR to Docetaxel dose, continue on morning of Docetaxel, and for 4 additional doses. 12 tablet 7     diclofenac (FLECTOR) 1.3 % patch Place 1 patch onto the skin daily as needed        fluticasone (FLONASE) 50 MCG/ACT nasal spray Spray 2 sprays into both nostrils daily as needed        hydrOXYzine (ATARAX) 10 MG tablet Take 10 mg by mouth 3 times daily as needed for itching       LORazepam (ATIVAN) 0.5 MG tablet Take 1 tablet (0.5 mg) by mouth every 4 hours as needed (Anxiety, Nausea/Vomiting or Sleep) 30 tablet 5     metoclopramide (REGLAN) 10 MG tablet Take 1 tablet (10 mg) by mouth 4 times daily (before meals and nightly) 120 tablet 0     multivitamin, therapeutic with minerals  "(MULTI-VITAMIN) TABS Take 1 tablet by mouth daily       ondansetron (ZOFRAN) 8 MG tablet Take 1 tablet (8 mg) by mouth every 8 hours as needed (Nausea/Vomiting) 10 tablet 7     pantoprazole (PROTONIX) 40 MG EC tablet Take 1 tablet (40 mg) by mouth 2 times daily 60 tablet 1     sucralfate (CARAFATE) 1 GM/10ML suspension Take 10 mLs (1 g) by mouth 4 times daily 420 mL 0     SUMAtriptan (IMITREX) 100 MG tablet Take 100 mg by mouth as needed       tiZANidine (ZANAFLEX) 4 MG tablet Take 1 tablet by mouth 2 times daily as needed       oxyCODONE-acetaminophen (PERCOCET) 5-325 MG tablet Take 1-2 tablets by mouth every 4 hours as needed for pain (Patient not taking: Reported on 4/13/2020) 12 tablet 0      She used to smoke but quit on the date of her surgery.  Denies alcohol use.  Lives with her , son and 3 grandkids.    Physical Exam:  General: The patient is a pleasant female in no acute distress.  /52 (BP Location: Left arm, Patient Position: Chair, Cuff Size: Adult Regular)   Pulse 96   Temp 98.1  F (36.7  C) (Oral)   Resp 18   Ht 1.727 m (5' 8\")   Wt 84.1 kg (185 lb 4.8 oz)   LMP 09/23/2014   SpO2 97%   BMI 28.17 kg/m    Wt Readings from Last 10 Encounters:   04/13/20 84.1 kg (185 lb 4.8 oz)   03/31/20 82.3 kg (181 lb 6.4 oz)   03/27/20 84.1 kg (185 lb 4.8 oz)   03/26/20 81.6 kg (180 lb)   03/19/20 82.2 kg (181 lb 3.2 oz)   03/10/20 80.3 kg (177 lb)   03/06/20 84.4 kg (186 lb)   03/03/20 83.2 kg (183 lb 8 oz)   02/27/20 85.9 kg (189 lb 6.4 oz)   02/21/20 84.2 kg (185 lb 9.6 oz)   HEENT: EOMI, PERRL. Sclerae are anicteric. Oral mucosa is pink and moist with no lesions or thrush.   Lymph: Neck is supple with no lymphadenopathy in the cervical or supraclavicular areas.   Heart: Regular rate and rhythm.   Lungs: Clear to auscultation bilaterally.   Abdomen: Bowel sounds present, soft, large midline whipple scar, healing well, clean and dry, with one area of superficial dehiscence. Tender to light touch " in the right upper quadrant. No hepatomegaly by percussion.   Extremities: No lower extremity edema noted bilaterally.   Neuro: Cranial nerves II through XII are grossly intact.  Skin: No rashes, petechiae, or bruising noted on exposed skin.    Laboratory/Imaging Studies  Results for TIKI ALONSO (MRN [id number]) as of 4/13/2020 14:30   Ref. Range 4/13/2020 13:20   Sodium Latest Ref Range: 133 - 144 mmol/L 142   Potassium Latest Ref Range: 3.4 - 5.3 mmol/L 3.7   Chloride Latest Ref Range: 94 - 109 mmol/L 111 (H)   Carbon Dioxide Latest Ref Range: 20 - 32 mmol/L 28   Urea Nitrogen Latest Ref Range: 7 - 30 mg/dL 10   Creatinine Latest Ref Range: 0.52 - 1.04 mg/dL 0.81   GFR Estimate Latest Ref Range: >60 mL/min/1.73_m2 83   GFR Estimate If Black Latest Ref Range: >60 mL/min/1.73_m2 >90   Calcium Latest Ref Range: 8.5 - 10.1 mg/dL 8.0 (L)   Anion Gap Latest Ref Range: 3 - 14 mmol/L 4   Albumin Latest Ref Range: 3.4 - 5.0 g/dL 2.6 (L)   Protein Total Latest Ref Range: 6.8 - 8.8 g/dL 6.4 (L)   Bilirubin Total Latest Ref Range: 0.2 - 1.3 mg/dL 0.1 (L)   Alkaline Phosphatase Latest Ref Range: 40 - 150 U/L 111   ALT Latest Ref Range: 0 - 50 U/L 25   AST Latest Ref Range: 0 - 45 U/L 22   Glucose Latest Ref Range: 70 - 99 mg/dL 115 (H)   WBC Latest Ref Range: 4.0 - 11.0 10e9/L 7.6   Hemoglobin Latest Ref Range: 11.7 - 15.7 g/dL 10.1 (L)   Hematocrit Latest Ref Range: 35.0 - 47.0 % 31.9 (L)   Platelet Count Latest Ref Range: 150 - 450 10e9/L 313   RBC Count Latest Ref Range: 3.8 - 5.2 10e12/L 3.69 (L)   MCV Latest Ref Range: 78 - 100 fl 86   MCH Latest Ref Range: 26.5 - 33.0 pg 27.4   MCHC Latest Ref Range: 31.5 - 36.5 g/dL 31.7   RDW Latest Ref Range: 10.0 - 15.0 % 15.5 (H)   Diff Method Unknown Automated Method   % Neutrophils Latest Units: % 47.1   % Lymphocytes Latest Units: % 39.7   % Monocytes Latest Units: % 10.1   % Eosinophils Latest Units: % 0.5   % Basophils Latest Units: % 0.9   % Immature Granulocytes Latest  Units: % 1.7   Nucleated RBCs Latest Ref Range: 0 /100 0   Absolute Neutrophil Latest Ref Range: 1.6 - 8.3 10e9/L 3.6   Absolute Lymphocytes Latest Ref Range: 0.8 - 5.3 10e9/L 3.0   Absolute Monocytes Latest Ref Range: 0.0 - 1.3 10e9/L 0.8   Absolute Eosinophils Latest Ref Range: 0.0 - 0.7 10e9/L 0.0   Absolute Basophils Latest Ref Range: 0.0 - 0.2 10e9/L 0.1   Abs Immature Granulocytes Latest Ref Range: 0 - 0.4 10e9/L 0.1   Absolute Nucleated RBC Unknown 0.0     Corrected calcium normal      ASSESSMENT/PLAN:    1. RUQ pain, labs and CT without explanation for severe pain. I explained this to the patient. She requested pain medications to manage symptoms. She says she doesn't tolerate APAP and was told not to take NSAIDs. She has a history of chronic pain and opoid use and saw a pain clinic provider in [place] which she stopped going to. I told her that I wouldn't prescribe narcotics for her unexplained pain. I gave her 15 tabs of tramadol. She agreed to see palliative care to help manage her chronic pain through her cancer treatment.     2.  Incidentally found poorly differentiated signet ring type gastric adenocarcinoma, HER-2 negative.  This was found on Whipple surgery specimen as it was done for presumed pancreatic IPMN although no pancreatic neoplasm was found.  It was a pT4aN0 lesion with lymphovascular invasion and perineural invasion seen.  All 22 lymph nodes were negative.  There was streaky density adjacent to the pancreaticojejunostomy? She is s/p 1 cycle of neoadjuvant 5FU, oxaliplatin, and Taxotere on 3/27/20. Scheduled Friday for delayed cycle 2 and she will see Vazquez. I think this visit would be ok as a video visit.     Lisa Valencia PA-C  [facility]  [address] [place] [address] [phone]

## 2020-04-13 NOTE — NURSING NOTE
Chief Complaint   Patient presents with     Port Draw     labs drawn via port by RN in lab     /52 (BP Location: Left arm, Patient Position: Chair, Cuff Size: Adult Regular)   Pulse 96   Temp 98.1  F (36.7  C) (Oral)   Resp 18   Wt 84.1 kg (185 lb 4.8 oz)   LMP 09/23/2014   SpO2 97%   BMI 28.17 kg/m      Port accessed by RN in lab. Labs collected and sent. Pt tolerated well. Line flushed with Normal Saline & Heparin.   Pt checked in for next appointment.    Perlita Rutledge, RN

## 2020-04-13 NOTE — DISCHARGE INSTRUCTIONS
What to expect when you have contrast    During your exam, we will inject  contrast  into your vein or artery. (Contrast is a clear liquid with iodine in it. It shows up on X-rays.)    You may feel warm or hot. You may have a metal taste in your mouth and a slight upset stomach. You may also feel pressure near the kidneys and bladder. These effects will last about 1 to 3 minutes.    Please tell us if you have:    Sneezing     Itching    Hives     Swelling in the face    A hoarse voice    Breathing problems    Other new symptoms    Serious problems are rare.  They may include:    Irregular heartbeat     Seizures    Kidney failure              Tissue damage    Shock      Death    If you have any problems during the exam, we  will treat them right away.    When you get home    Call your hospital if you have any new symptoms in the next 2 days, like hives or swelling. (Phone numbers are at the bottom of this page.) Or call your family doctor.     If you have wheezing or trouble breathing, call 911.    Self-care  -Drink at least 4 extra glasses of water today.   This reduces the stress on your kidneys.  -Keep taking your regular medicines.    The contrast will pass out of your body in your  Urine(pee). This will happen in the next 24 hours. You  will not feel this. Your urine will not  change color.    If you have kidney problems or take metformin    Drink 4 to 8 large glasses of water for the next  2 days, if you are not on a fluid restriction.    ?If you take metformin (Glucophage or Glucovance) for diabetes, keep taking it.      ?Your kidney function tests are abnormal.  If you take Metformin, do not take it for 48 hours. Please go to your clinic for a blood test within 3 days after your exam before the restarting this medicine.     (Note to provider:please give patient prescription for lab tests.)    ?Special instructions: ***    I have read and understand the above information.    Patient Sign  Here:______________________________________Date:________Time:______    Staff Sign Here:________________________________________Date:_______Time:______      Radiology Departments:     ?[facility]: [phone] ?[facility]: [phone]     ?[place]: [phone] ?[facility]:[phone]      ?[place]: [phone]  ?[facility]: [phone]  ?[facility]:[phone]    ?[facility]:[phone]  ?[facility]:[phone] to expect when you have contrast    During your exam, we will inject  contrast  into your vein or artery. (Contrast is a clear liquid with iodine in it. It shows up on X-rays.)    You may feel warm or hot. You may have a metal taste in your mouth and a slight upset stomach. You may also feel pressure near the kidneys and bladder. These effects will last about 1 to 3 minutes.    Please tell us if you have:    Sneezing     Itching    Hives     Swelling in the face    A hoarse voice    Breathing problems    Other new symptoms    Serious problems are rare.  They may include:    Irregular heartbeat     Seizures    Kidney failure              Tissue damage    Shock      Death    If you have any problems during the exam, we  will treat them right away.    When you get home    Call your hospital if you have any new symptoms in the next 2 days, like hives or swelling. (Phone numbers are at the bottom of this page.) Or call your family doctor.     If you have wheezing or trouble breathing, call 911.    Self-care  -Drink at least 4 extra glasses of water today.   This reduces the stress on your kidneys.  -Keep taking your regular medicines.    The contrast will pass out of your body in your  Urine(pee). This will happen in the next 24 hours. You  will not feel this. Your urine will not  change color.    If you have kidney problems or take metformin    Drink 4 to 8 large glasses of water for the next  2 days, if you are not on a fluid restriction.    ?If you take metformin (Glucophage or Glucovance) for  diabetes, keep taking it.      ?Your kidney function tests are abnormal.  If you take Metformin, do not take it for 48 hours. Please go to your clinic for a blood test within 3 days after your exam before the restarting this medicine.     (Note to provider:please give patient prescription for lab tests.)    ?Special instructions: ***    I have read and understand the above information.    Patient Sign Here:______________________________________Date:________Time:______    Staff Sign Here:________________________________________Date:_______Time:______      Radiology Departments:     ?[facility]: [phone] ?[place]: [phone]     ?[place]: [phone] ?[facility]:[phone]      ?[place]: [phone]  ?[facility]: [phone]  ?[place]:[phone]    ?[facility]:[phone]  ?[facility]:[phone]

## 2020-04-13 NOTE — LETTER
4/13/2020       RE: Nathalie Bashir  [address] [facility] [address]     Dear Colleague,    Thank you for referring your patient, Nathalie Bashir, to the [facility]. Please see a copy of my visit note below.    Oncology follow up visit:  Date on this visit: Apr 13, 2020    Reason for visit: f/u of gastric cancer, add on visit to evaluate acute abdominal pain    Primary Physician: Marianne Gonzalez     History Of Present Illness:  Ms. Bashir is a 54 year old female with a history of gastric adenocarcinoma.  She was being followed for a pancreatic duct dilatation and pancreatic cyst which was noted in November 2018.  Since May 2019 she started noticing some nausea and vomiting and occasional abdominal discomfort.  She had an ultrasound in August 2019 which was essentially unremarkable.  She had a repeat endoscopic ultrasound on 10/29/2019 which showed increase in size of the cyst as well as dilation of the main pancreatic duct.  FNA and fluid analysis showed mucinous epithelium.  She was referred to Dr. 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  Surprisingly there was no pancreatic ductal neoplasm seen but on the resected specimen stomach cancer was noted.  It was poorly differentiated adenocarcinoma with poorly cohesive/signet ring cell type with proximal gastric mucosal and serosal margins being positive.  There was lymphovascular and perineural invasion seen.  22 lymph nodes were sampled and all were benign.  It was a pT4aN0 lesion.  HER-2/christine FISH was not amplified.  Because of the surprising finding she was referred to medical oncology.  Prior to that she was seen by Dr. Mallory who recommended and arranged further restaging procedure with EUS and a PET scan but because of recent surgery, the timing of the PET scan was moved forward so that it is at least 6 weeks out from the surgery so that it does not interfere with postsurgical inflammation.      EUS on  3/3/2020 showed:     Postop changes consistent with prior non-pylorus                        sparing Whipple resection. Some mucosal nodularity along                        the lesser curvature with scattered exudate. By                        ultrasound this appears to correspond to a staple or                        suture line. No gross evidence of neoplasm, however the                        previous neoplasm was infiltrative and may not be                        readily seen endoscopically.                        Non-specific loss of sonographic wall layers along the                        lesser curvature in this region adjacent to apparent                        suture. This is potentially consistent with                        post-operative edema, however it is not possible to                        completely exclude infiltrative tumor in this region.                        There was no target for needle aspiration (wall 3 mm in                        this region). Forceops biopsies obtained from this                        region and separately along the gastrojejunal                        anastomosis.                        - Two prominent left gastric artery lymph nodes. Needle                        aspiration performed and preliminary cytology suggested                        benign lymphoid material.                        - No liver lesions seen in the visualized portion                        (limited by post-op changes).     Left gastric lymph node fine-needle aspiration was negative for carcinoma.  Biopsy from the gastric jejunal anastomosis as well as lesser curvature of the stomach also did not show any malignancy.    She had a PET/CT on 3/13/2020 which showed soft tissue streaky density with increased FDG uptake adjacent to the pancreaticojejunostomy which could be neoplastic in origin.  There is mild increased FDG uptake in the gastric remnant.  Focal area of soft tissue thickening with  fatty streakiness along medial laparotomy with increased FDG uptake which likely is inflammatory.  Increased FDG uptake in thoracic esophagus which could be esophagitis. She started on chemotherapy with 5FU, oxaliplatin, and Taxotere on 3/27/20. She was seen on 4/7/20 for evaluation of fevers. She was treated for possible pneumonia with Levaquin.     Interval History/EOS:  Dena has noted 5 days of RUQ pain, 8/10, dull and sharp at times, constant pain. Getting worse. Worse with movement. Not worse with meals. Nothing makes it better. Tried APAP. Taking pantoprazole BID, Carafate QID, and Reglan QID. No jaundice. She doesn't have a gall bladder. She denies having this type of pain before. The LUQ pain has gotten significantly better with Carafate and modifying her diet. No fevers or chills and cough has improved. She has completed the course of Levaquin.     She called surgery first but she was told to follow up with us.     10 pt ROS otherwise negative.     Current Medications:  Current Outpatient Medications   Medication Sig Dispense Refill     amitriptyline (ELAVIL) 25 MG tablet Take 3 tablets by mouth At Bedtime       calcium carb 1250 mg, 500 mg [place],/vitamin D 200 units (OSCAL WITH D) 500-200 MG-UNIT per tablet Take 1 tablet by mouth 3 times daily (with meals)       citalopram (CELEXA) 20 MG tablet Take 20 mg by mouth every morning        dexamethasone (DECADRON) 4 MG tablet Take 2 tablets (8 mg) by mouth 2 times daily (with meals) Start the evening PRIOR to Docetaxel dose, continue on morning of Docetaxel, and for 4 additional doses. 12 tablet 7     diclofenac (FLECTOR) 1.3 % patch Place 1 patch onto the skin daily as needed        fluticasone (FLONASE) 50 MCG/ACT nasal spray Spray 2 sprays into both nostrils daily as needed        hydrOXYzine (ATARAX) 10 MG tablet Take 10 mg by mouth 3 times daily as needed for itching       LORazepam (ATIVAN) 0.5 MG tablet Take 1 tablet (0.5 mg) by mouth every 4 hours as  "needed (Anxiety, Nausea/Vomiting or Sleep) 30 tablet 5     metoclopramide (REGLAN) 10 MG tablet Take 1 tablet (10 mg) by mouth 4 times daily (before meals and nightly) 120 tablet 0     multivitamin, therapeutic with minerals (MULTI-VITAMIN) TABS Take 1 tablet by mouth daily       ondansetron (ZOFRAN) 8 MG tablet Take 1 tablet (8 mg) by mouth every 8 hours as needed (Nausea/Vomiting) 10 tablet 7     pantoprazole (PROTONIX) 40 MG EC tablet Take 1 tablet (40 mg) by mouth 2 times daily 60 tablet 1     sucralfate (CARAFATE) 1 GM/10ML suspension Take 10 mLs (1 g) by mouth 4 times daily 420 mL 0     SUMAtriptan (IMITREX) 100 MG tablet Take 100 mg by mouth as needed       tiZANidine (ZANAFLEX) 4 MG tablet Take 1 tablet by mouth 2 times daily as needed       oxyCODONE-acetaminophen (PERCOCET) 5-325 MG tablet Take 1-2 tablets by mouth every 4 hours as needed for pain (Patient not taking: Reported on 4/13/2020) 12 tablet 0      She used to smoke but quit on the date of her surgery.  Denies alcohol use.  Lives with her , son and 3 grandkids.    Physical Exam:  General: The patient is a pleasant female in no acute distress.  /52 (BP Location: Left arm, Patient Position: Chair, Cuff Size: Adult Regular)   Pulse 96   Temp 98.1  F (36.7  C) (Oral)   Resp 18   Ht 1.727 m (5' 8\")   Wt 84.1 kg (185 lb 4.8 oz)   LMP 09/23/2014   SpO2 97%   BMI 28.17 kg/m    Wt Readings from Last 10 Encounters:   04/13/20 84.1 kg (185 lb 4.8 oz)   03/31/20 82.3 kg (181 lb 6.4 oz)   03/27/20 84.1 kg (185 lb 4.8 oz)   03/26/20 81.6 kg (180 lb)   03/19/20 82.2 kg (181 lb 3.2 oz)   03/10/20 80.3 kg (177 lb)   03/06/20 84.4 kg (186 lb)   03/03/20 83.2 kg (183 lb 8 oz)   02/27/20 85.9 kg (189 lb 6.4 oz)   02/21/20 84.2 kg (185 lb 9.6 oz)   HEENT: EOMI, PERRL. Sclerae are anicteric. Oral mucosa is pink and moist with no lesions or thrush.   Lymph: Neck is supple with no lymphadenopathy in the cervical or supraclavicular areas.   Heart: " Regular rate and rhythm.   Lungs: Clear to auscultation bilaterally.   Abdomen: Bowel sounds present, soft, large midline whipple scar, healing well, clean and dry, with one area of superficial dehiscence. Tender to light touch in the right upper quadrant. No hepatomegaly by percussion.   Extremities: No lower extremity edema noted bilaterally.   Neuro: Cranial nerves II through XII are grossly intact.  Skin: No rashes, petechiae, or bruising noted on exposed skin.    Laboratory/Imaging Studies  Results for TIKI ALONSO (MRN [id number]) as of 4/13/2020 14:30   Ref. Range 4/13/2020 13:20   Sodium Latest Ref Range: 133 - 144 mmol/L 142   Potassium Latest Ref Range: 3.4 - 5.3 mmol/L 3.7   Chloride Latest Ref Range: 94 - 109 mmol/L 111 (H)   Carbon Dioxide Latest Ref Range: 20 - 32 mmol/L 28   Urea Nitrogen Latest Ref Range: 7 - 30 mg/dL 10   Creatinine Latest Ref Range: 0.52 - 1.04 mg/dL 0.81   GFR Estimate Latest Ref Range: >60 mL/min/1.73_m2 83   GFR Estimate If Black Latest Ref Range: >60 mL/min/1.73_m2 >90   Calcium Latest Ref Range: 8.5 - 10.1 mg/dL 8.0 (L)   Anion Gap Latest Ref Range: 3 - 14 mmol/L 4   Albumin Latest Ref Range: 3.4 - 5.0 g/dL 2.6 (L)   Protein Total Latest Ref Range: 6.8 - 8.8 g/dL 6.4 (L)   Bilirubin Total Latest Ref Range: 0.2 - 1.3 mg/dL 0.1 (L)   Alkaline Phosphatase Latest Ref Range: 40 - 150 U/L 111   ALT Latest Ref Range: 0 - 50 U/L 25   AST Latest Ref Range: 0 - 45 U/L 22   Glucose Latest Ref Range: 70 - 99 mg/dL 115 (H)   WBC Latest Ref Range: 4.0 - 11.0 10e9/L 7.6   Hemoglobin Latest Ref Range: 11.7 - 15.7 g/dL 10.1 (L)   Hematocrit Latest Ref Range: 35.0 - 47.0 % 31.9 (L)   Platelet Count Latest Ref Range: 150 - 450 10e9/L 313   RBC Count Latest Ref Range: 3.8 - 5.2 10e12/L 3.69 (L)   MCV Latest Ref Range: 78 - 100 fl 86   MCH Latest Ref Range: 26.5 - 33.0 pg 27.4   MCHC Latest Ref Range: 31.5 - 36.5 g/dL 31.7   RDW Latest Ref Range: 10.0 - 15.0 % 15.5 (H)   Diff Method Unknown  Automated Method   % Neutrophils Latest Units: % 47.1   % Lymphocytes Latest Units: % 39.7   % Monocytes Latest Units: % 10.1   % Eosinophils Latest Units: % 0.5   % Basophils Latest Units: % 0.9   % Immature Granulocytes Latest Units: % 1.7   Nucleated RBCs Latest Ref Range: 0 /100 0   Absolute Neutrophil Latest Ref Range: 1.6 - 8.3 10e9/L 3.6   Absolute Lymphocytes Latest Ref Range: 0.8 - 5.3 10e9/L 3.0   Absolute Monocytes Latest Ref Range: 0.0 - 1.3 10e9/L 0.8   Absolute Eosinophils Latest Ref Range: 0.0 - 0.7 10e9/L 0.0   Absolute Basophils Latest Ref Range: 0.0 - 0.2 10e9/L 0.1   Abs Immature Granulocytes Latest Ref Range: 0 - 0.4 10e9/L 0.1   Absolute Nucleated RBC Unknown 0.0     Corrected calcium normal      ASSESSMENT/PLAN:    1. RUQ pain, labs and CT without explanation for severe pain. I explained this to the patient. She requested pain medications to manage symptoms. She says she doesn't tolerate APAP and was told not to take NSAIDs. She has a history of chronic pain and opoid use and saw a pain clinic provider in [place] which she stopped going to. I told her that I wouldn't prescribe narcotics for her unexplained pain. I gave her 15 tabs of tramadol. She agreed to see palliative care to help manage her chronic pain through her cancer treatment.     2.  Incidentally found poorly differentiated signet ring type gastric adenocarcinoma, HER-2 negative.  This was found on Whipple surgery specimen as it was done for presumed pancreatic IPMN although no pancreatic neoplasm was found.  It was a pT4aN0 lesion with lymphovascular invasion and perineural invasion seen.  All 22 lymph nodes were negative.  There was streaky density adjacent to the pancreaticojejunostomy? She is s/p 1 cycle of neoadjuvant 5FU, oxaliplatin, and Taxotere on 3/27/20. Scheduled Friday for delayed cycle 2 and she will see Vazquez. I think this visit would be ok as a video visit.     Lisa Valencia PA-C  [facility]  [address]  [place] [address]  [phone]

## 2020-04-13 NOTE — LETTER
4/13/2020      RE: Nathalie Bashir  [address] [facility] [address]       Oncology follow up visit:  Date on this visit: Apr 13, 2020    Reason for visit: f/u of gastric cancer, add on visit to evaluate acute abdominal pain    Primary Physician: Marianne Gonzalez     History Of Present Illness:  Ms. Bashir is a 54 year old female with a history of gastric adenocarcinoma.  She was being followed for a pancreatic duct dilatation and pancreatic cyst which was noted in November 2018.  Since May 2019 she started noticing some nausea and vomiting and occasional abdominal discomfort.  She had an ultrasound in August 2019 which was essentially unremarkable.  She had a repeat endoscopic ultrasound on 10/29/2019 which showed increase in size of the cyst as well as dilation of the main pancreatic duct.  FNA and fluid analysis showed mucinous epithelium.  She was referred to Dr. 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  Surprisingly there was no pancreatic ductal neoplasm seen but on the resected specimen stomach cancer was noted.  It was poorly differentiated adenocarcinoma with poorly cohesive/signet ring cell type with proximal gastric mucosal and serosal margins being positive.  There was lymphovascular and perineural invasion seen.  22 lymph nodes were sampled and all were benign.  It was a pT4aN0 lesion.  HER-2/christine FISH was not amplified.  Because of the surprising finding she was referred to medical oncology.  Prior to that she was seen by Dr. Mallory who recommended and arranged further restaging procedure with EUS and a PET scan but because of recent surgery, the timing of the PET scan was moved forward so that it is at least 6 weeks out from the surgery so that it does not interfere with postsurgical inflammation.      EUS on 3/3/2020 showed:     Postop changes consistent with prior non-pylorus                        sparing Whipple resection. Some mucosal nodularity along                         the lesser curvature with scattered exudate. By                        ultrasound this appears to correspond to a staple or                        suture line. No gross evidence of neoplasm, however the                        previous neoplasm was infiltrative and may not be                        readily seen endoscopically.                        Non-specific loss of sonographic wall layers along the                        lesser curvature in this region adjacent to apparent                        suture. This is potentially consistent with                        post-operative edema, however it is not possible to                        completely exclude infiltrative tumor in this region.                        There was no target for needle aspiration (wall 3 mm in                        this region). Forceops biopsies obtained from this                        region and separately along the gastrojejunal                        anastomosis.                        - Two prominent left gastric artery lymph nodes. Needle                        aspiration performed and preliminary cytology suggested                        benign lymphoid material.                        - No liver lesions seen in the visualized portion                        (limited by post-op changes).     Left gastric lymph node fine-needle aspiration was negative for carcinoma.  Biopsy from the gastric jejunal anastomosis as well as lesser curvature of the stomach also did not show any malignancy.    She had a PET/CT on 3/13/2020 which showed soft tissue streaky density with increased FDG uptake adjacent to the pancreaticojejunostomy which could be neoplastic in origin.  There is mild increased FDG uptake in the gastric remnant.  Focal area of soft tissue thickening with fatty streakiness along medial laparotomy with increased FDG uptake which likely is inflammatory.  Increased FDG uptake in thoracic esophagus which could be  esophagitis. She started on chemotherapy with 5FU, oxaliplatin, and Taxotere on 3/27/20. She was seen on 4/7/20 for evaluation of fevers. She was treated for possible pneumonia with Levaquin.     Interval History/EOS:  Dena has noted 5 days of RUQ pain, 8/10, dull and sharp at times, constant pain. Getting worse. Worse with movement. Not worse with meals. Nothing makes it better. Tried APAP. Taking pantoprazole BID, Carafate QID, and Reglan QID. No jaundice. She doesn't have a gall bladder. She denies having this type of pain before. The LUQ pain has gotten significantly better with Carafate and modifying her diet. No fevers or chills and cough has improved. She has completed the course of Levaquin.     She called surgery first but she was told to follow up with us.     10 pt ROS otherwise negative.     Current Medications:  Current Outpatient Medications   Medication Sig Dispense Refill     amitriptyline (ELAVIL) 25 MG tablet Take 3 tablets by mouth At Bedtime       calcium carb 1250 mg, 500 mg [place],/vitamin D 200 units (OSCAL WITH D) 500-200 MG-UNIT per tablet Take 1 tablet by mouth 3 times daily (with meals)       citalopram (CELEXA) 20 MG tablet Take 20 mg by mouth every morning        dexamethasone (DECADRON) 4 MG tablet Take 2 tablets (8 mg) by mouth 2 times daily (with meals) Start the evening PRIOR to Docetaxel dose, continue on morning of Docetaxel, and for 4 additional doses. 12 tablet 7     diclofenac (FLECTOR) 1.3 % patch Place 1 patch onto the skin daily as needed        fluticasone (FLONASE) 50 MCG/ACT nasal spray Spray 2 sprays into both nostrils daily as needed        hydrOXYzine (ATARAX) 10 MG tablet Take 10 mg by mouth 3 times daily as needed for itching       LORazepam (ATIVAN) 0.5 MG tablet Take 1 tablet (0.5 mg) by mouth every 4 hours as needed (Anxiety, Nausea/Vomiting or Sleep) 30 tablet 5     metoclopramide (REGLAN) 10 MG tablet Take 1 tablet (10 mg) by mouth 4 times daily (before meals and  "nightly) 120 tablet 0     multivitamin, therapeutic with minerals (MULTI-VITAMIN) TABS Take 1 tablet by mouth daily       ondansetron (ZOFRAN) 8 MG tablet Take 1 tablet (8 mg) by mouth every 8 hours as needed (Nausea/Vomiting) 10 tablet 7     pantoprazole (PROTONIX) 40 MG EC tablet Take 1 tablet (40 mg) by mouth 2 times daily 60 tablet 1     sucralfate (CARAFATE) 1 GM/10ML suspension Take 10 mLs (1 g) by mouth 4 times daily 420 mL 0     SUMAtriptan (IMITREX) 100 MG tablet Take 100 mg by mouth as needed       tiZANidine (ZANAFLEX) 4 MG tablet Take 1 tablet by mouth 2 times daily as needed       oxyCODONE-acetaminophen (PERCOCET) 5-325 MG tablet Take 1-2 tablets by mouth every 4 hours as needed for pain (Patient not taking: Reported on 4/13/2020) 12 tablet 0      She used to smoke but quit on the date of her surgery.  Denies alcohol use.  Lives with her , son and 3 grandkids.    Physical Exam:  General: The patient is a pleasant female in no acute distress.  /52 (BP Location: Left arm, Patient Position: Chair, Cuff Size: Adult Regular)   Pulse 96   Temp 98.1  F (36.7  C) (Oral)   Resp 18   Ht 1.727 m (5' 8\")   Wt 84.1 kg (185 lb 4.8 oz)   LMP 09/23/2014   SpO2 97%   BMI 28.17 kg/m    Wt Readings from Last 10 Encounters:   04/13/20 84.1 kg (185 lb 4.8 oz)   03/31/20 82.3 kg (181 lb 6.4 oz)   03/27/20 84.1 kg (185 lb 4.8 oz)   03/26/20 81.6 kg (180 lb)   03/19/20 82.2 kg (181 lb 3.2 oz)   03/10/20 80.3 kg (177 lb)   03/06/20 84.4 kg (186 lb)   03/03/20 83.2 kg (183 lb 8 oz)   02/27/20 85.9 kg (189 lb 6.4 oz)   02/21/20 84.2 kg (185 lb 9.6 oz)   HEENT: EOMI, PERRL. Sclerae are anicteric. Oral mucosa is pink and moist with no lesions or thrush.   Lymph: Neck is supple with no lymphadenopathy in the cervical or supraclavicular areas.   Heart: Regular rate and rhythm.   Lungs: Clear to auscultation bilaterally.   Abdomen: Bowel sounds present, soft, large midline whipple scar, healing well, clean and " dry, with one area of superficial dehiscence. Tender to light touch in the right upper quadrant. No hepatomegaly by percussion.   Extremities: No lower extremity edema noted bilaterally.   Neuro: Cranial nerves II through XII are grossly intact.  Skin: No rashes, petechiae, or bruising noted on exposed skin.    Laboratory/Imaging Studies  Results for TIKI ALONSO (MRN [id number]) as of 4/13/2020 14:30   Ref. Range 4/13/2020 13:20   Sodium Latest Ref Range: 133 - 144 mmol/L 142   Potassium Latest Ref Range: 3.4 - 5.3 mmol/L 3.7   Chloride Latest Ref Range: 94 - 109 mmol/L 111 (H)   Carbon Dioxide Latest Ref Range: 20 - 32 mmol/L 28   Urea Nitrogen Latest Ref Range: 7 - 30 mg/dL 10   Creatinine Latest Ref Range: 0.52 - 1.04 mg/dL 0.81   GFR Estimate Latest Ref Range: >60 mL/min/1.73_m2 83   GFR Estimate If Black Latest Ref Range: >60 mL/min/1.73_m2 >90   Calcium Latest Ref Range: 8.5 - 10.1 mg/dL 8.0 (L)   Anion Gap Latest Ref Range: 3 - 14 mmol/L 4   Albumin Latest Ref Range: 3.4 - 5.0 g/dL 2.6 (L)   Protein Total Latest Ref Range: 6.8 - 8.8 g/dL 6.4 (L)   Bilirubin Total Latest Ref Range: 0.2 - 1.3 mg/dL 0.1 (L)   Alkaline Phosphatase Latest Ref Range: 40 - 150 U/L 111   ALT Latest Ref Range: 0 - 50 U/L 25   AST Latest Ref Range: 0 - 45 U/L 22   Glucose Latest Ref Range: 70 - 99 mg/dL 115 (H)   WBC Latest Ref Range: 4.0 - 11.0 10e9/L 7.6   Hemoglobin Latest Ref Range: 11.7 - 15.7 g/dL 10.1 (L)   Hematocrit Latest Ref Range: 35.0 - 47.0 % 31.9 (L)   Platelet Count Latest Ref Range: 150 - 450 10e9/L 313   RBC Count Latest Ref Range: 3.8 - 5.2 10e12/L 3.69 (L)   MCV Latest Ref Range: 78 - 100 fl 86   MCH Latest Ref Range: 26.5 - 33.0 pg 27.4   MCHC Latest Ref Range: 31.5 - 36.5 g/dL 31.7   RDW Latest Ref Range: 10.0 - 15.0 % 15.5 (H)   Diff Method Unknown Automated Method   % Neutrophils Latest Units: % 47.1   % Lymphocytes Latest Units: % 39.7   % Monocytes Latest Units: % 10.1   % Eosinophils Latest Units: % 0.5    % Basophils Latest Units: % 0.9   % Immature Granulocytes Latest Units: % 1.7   Nucleated RBCs Latest Ref Range: 0 /100 0   Absolute Neutrophil Latest Ref Range: 1.6 - 8.3 10e9/L 3.6   Absolute Lymphocytes Latest Ref Range: 0.8 - 5.3 10e9/L 3.0   Absolute Monocytes Latest Ref Range: 0.0 - 1.3 10e9/L 0.8   Absolute Eosinophils Latest Ref Range: 0.0 - 0.7 10e9/L 0.0   Absolute Basophils Latest Ref Range: 0.0 - 0.2 10e9/L 0.1   Abs Immature Granulocytes Latest Ref Range: 0 - 0.4 10e9/L 0.1   Absolute Nucleated RBC Unknown 0.0     Corrected calcium normal      ASSESSMENT/PLAN:    1. RUQ pain, labs and CT without explanation for severe pain. I explained this to the patient. She requested pain medications to manage symptoms. She says she doesn't tolerate APAP and was told not to take NSAIDs. She has a history of chronic pain and opoid use and saw a pain clinic provider in [place] which she stopped going to. I told her that I wouldn't prescribe narcotics for her unexplained pain. I gave her 15 tabs of tramadol. She agreed to see palliative care to help manage her chronic pain through her cancer treatment.     2.  Incidentally found poorly differentiated signet ring type gastric adenocarcinoma, HER-2 negative.  This was found on Whipple surgery specimen as it was done for presumed pancreatic IPMN although no pancreatic neoplasm was found.  It was a pT4aN0 lesion with lymphovascular invasion and perineural invasion seen.  All 22 lymph nodes were negative.  There was streaky density adjacent to the pancreaticojejunostomy? She is s/p 1 cycle of neoadjuvant 5FU, oxaliplatin, and Taxotere on 3/27/20. Scheduled Friday for delayed cycle 2 and she will see Vazquez. I think this visit would be ok as a video visit.     Lisa Valencia PA-C  [facility]  [address] [place] [id number]  [phone]            Lisa Valencia PA-C

## 2020-04-13 NOTE — TELEPHONE ENCOUNTER
"Pt call to CO pain on her R side \"under her R ribcage where my whipple was done \". Pain is a constant shooting pain that began last week about Tuesday 4/7.  AT it's worst Pt rates it an 8/10.  June's visit notes states \"2. Epigastric/LUQ pain: Seems very consistent with gastritis. She was on high dose dexamethasone with chemotherapy. Increase pantoprazole to 40 mg BID. Start carafate QID PRN. Avoid offensive foods. \" Pt has been taking Pantoprazole BID and carafate QID.  Site is not discolored but Pt states that it is swollen and warmer than surrounding tissue, no drainage/seepage. NO red/pink lines around incision, Pt is afebrile.  Pt wondering is this is cause for concern requiring some kind of visit/ecaluation.  Per Kenzie Ace this question is best routed through oncology as procedure was some time ago.    Paged Dr Pollard.    Spoke with Dr Pollard. Pt should be seen today. Roxana JACOBO has an opening, paging about Pt being seen.  Spoke with Roxana, she will see with CBC & CMP prior. Placed orders and messaged scheduling.  "

## 2020-04-14 NOTE — PROGRESS NOTES
"Nathalie Bashir is a 54 year old female who is being evaluated via a billable video visit.      The patient has been notified of following:     \"This video visit will be conducted via a call between you and your physician/provider. We have found that certain health care needs can be provided without the need for an in-person physical exam.  This service lets us provide the care you need with a video conversation.  If a prescription is necessary we can send it directly to your pharmacy.  If lab work is needed we can place an order for that and you can then stop by our lab to have the test done at a later time.    Video visits are billed at different rates depending on your insurance coverage.  Please reach out to your insurance provider with any questions.    If during the course of the call the physician/provider feels a video visit is not appropriate, you will not be charged for this service.\"    Patient has given verbal consent for Video visit? Yes    How would you like to obtain your AVS? MyChart    Patient would like the video invitation sent by: Text to cell phone: [phone]       I have reviewed and updated the patient's allergies and medication list.    Concerns: Patient has continued concerns about her abdominal pain.    Refills: N/A    HERLINDA Buchanan    Video Start Time: 6:54 AM    Additional provider notes:     Palliative Care Outpatient Clinic Consultation Note    Patient:  Nathalie Bashir    Chief Complaint:   Nathalie Bashir 54 year old female who is presenting to the palliative medicine clinic today at the request of Lisa Valencia PA-C for a palliative care consultation secondary to pain.   The patient's primary care provider is:  Marianne Gonzalez.     History of Present Illness:  Nathalie Bashir is a 55yo F with a history of recently diagnosed T4N0M0 gastric cancer s/p resection on chemo, depression, migraine, and hx hip replacement, chronic low back pain with sacroiliitis and trochanteric bursitis seen to " "establish palliative care.      This patient's cancer history was reviewed as per the chart.  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  PET/CT 3/2020 showed streaky density in soft tissue which could be neoplastic and increased FDG uptake - per Halley Pollard and Mohamud, likely inflammatory due to surgery and was decreased on recent CT.  Has been receiving FLOT chemo (5FU, leucovorin, oxaliplatin, docetaxel).  Course since starting chemo with gastritis on steroids, diarrhea.  Plan currently for 3 months of chemo then curative intent surgery per Dr. Mallory's last note.  She has had 1 cycle and tolerated this well without significant side effects.    She today has abdominal pain - started a week ago.  Describes as feeling like a softball under her right ribs.  Said is in the same area she had pain after her surgery.  Describes as a constant dull ache that will occasionally shoot towards her back if she stretches or moves too fast.  Does seem to be worse if she eats things like vegetables or onions and describes it as \"knotted up\", better if she eats more plain food soup or with less seasoning.  Does not change with bowel movements and she has not been constipated, has regular bowel movement every day without laxatives.  Has tried heat which did not help and cold which helps some.  Has not tried taking tizanidine.  Got Percocet in the ER which she says helped some.  Took tramadol which did not help and she had a migraine after.  Not taking NSAIDs because was advised " against with gastritis.  Has gotten upset stomach with Tylenol in the past, but did tolerate the Percocet.    Also has been having stomach pains for the last few weeks, on her left side, every time she eats or burps she will taste asked that he feeling.  Is worse in the afternoon.  Started metoclopramide which did not seem to help.  Is taking Protonix twice a day and sucralfate 4 times a day as needed which has helped.    History of chronic neck and back pain - previously following with Apurva Benjamin NP in [place], prescribing oxycodone 10 mg #120/month dating back to at least 3/2019.  Has intolerances to pregabalin (interference with duloxetine?, not on duloxetine now), gabapentin, methadone, oxycontin, naprosyn, topiramate, and tylenol (urinary retention, nausea) listed in allergies.  She has stopped going to this pain clinic, and says her neck and back pain are doing all right without oxycodone.    Patient's Disease Understanding: Describes the plan to do 3 months of chemo, then surgery to remove the rest of her stomach and reconnect.  Is hopeful this will be curative.    Coping: Feels her mood has overall been good, denies any depressive or anxious moods.    Social History  Living Situation: Lives with her , son, and 3 grandchildren  Children: Son and his family  Support System: Family  Occupation: Retired, worked in special education  Hobbies: Enjoys playing games on her phone and reading  Substance Use/History of misuse: No history  Financial Concerns: Not discussed  Spiritual Background: Says she is not very spiritual  Social History     Tobacco Use     Smoking status: Former Smoker     Packs/day: 0.50     Years: 25.00     Pack years: 12.50     Types: Cigarettes     Last attempt to quit: 2020     Years since quittin.2     Smokeless tobacco: Never Used   Substance Use Topics     Alcohol use: No     Drug use: No       Family History  Family History   Problem Relation Age of Onset      Heart Failure Mother 77     Anesthesia Reaction No family hx of      Deep Vein Thrombosis No family hx of    Her paternal uncle had stomach cancer as well    Advance Care Planning:  Advance Directive:    Has not completed    Allergies   Allergen Reactions     Gluten Meal Palpitations     Pregabalin      interfered with Cymbalta     Augmentin Rash     Acetaminophen Nausea and Other (See Comments)     Urine retention       Dust Mite Extract      Gabapentin GI Disturbance     dizziness     Methadone Fatigue     Mold      Naprosyn [Naproxen] GI Disturbance     dizziness     Oxycontin [Oxycodone]      Confusion, loopy, oxycodone is tolerated     Oxymetholone      Seasonal Allergies      Topiramate Other (See Comments)     Tongue numbness, taste alteration, irritable      Latex Rash     Current Outpatient Medications   Medication Sig Dispense Refill     amitriptyline (ELAVIL) 25 MG tablet Take 3 tablets by mouth At Bedtime       calcium carb 1250 mg, 500 mg [place],/vitamin D 200 units (OSCAL WITH D) 500-200 MG-UNIT per tablet Take 1 tablet by mouth 3 times daily (with meals)       citalopram (CELEXA) 20 MG tablet Take 20 mg by mouth every morning        dexamethasone (DECADRON) 4 MG tablet Take 2 tablets (8 mg) by mouth 2 times daily (with meals) Start the evening PRIOR to Docetaxel dose, continue on morning of Docetaxel, and for 4 additional doses. 12 tablet 7     diclofenac (FLECTOR) 1.3 % patch Place 1 patch onto the skin daily as needed        fluticasone (FLONASE) 50 MCG/ACT nasal spray Spray 2 sprays into both nostrils daily as needed        hydrOXYzine (ATARAX) 10 MG tablet Take 10 mg by mouth 3 times daily as needed for itching       LORazepam (ATIVAN) 0.5 MG tablet Take 1 tablet (0.5 mg) by mouth every 4 hours as needed (Anxiety, Nausea/Vomiting or Sleep) 30 tablet 5     metoclopramide (REGLAN) 10 MG tablet Take 1 tablet (10 mg) by mouth 4 times daily (before meals and nightly) 120 tablet 0     multivitamin,  therapeutic with minerals (MULTI-VITAMIN) TABS Take 1 tablet by mouth daily       ondansetron (ZOFRAN) 8 MG tablet Take 1 tablet (8 mg) by mouth every 8 hours as needed (Nausea/Vomiting) 10 tablet 7     pantoprazole (PROTONIX) 40 MG EC tablet Take 1 tablet (40 mg) by mouth 2 times daily 60 tablet 1     sucralfate (CARAFATE) 1 GM/10ML suspension Take 10 mLs (1 g) by mouth 4 times daily 420 mL 0     SUMAtriptan (IMITREX) 100 MG tablet Take 100 mg by mouth as needed       tiZANidine (ZANAFLEX) 4 MG tablet Take 1 tablet by mouth 2 times daily as needed       traMADol (ULTRAM) 50 MG tablet Take 1 tablet (50 mg) by mouth every 6 hours as needed for severe pain 15 tablet 0     Past Medical History:   Diagnosis Date     Allergic rhinitis      Cancer (H)     stomach cancer     Depression      Lumbago      Migraine      Other chronic pain     low back     Sacroiliitis (H)     low back pain     Trochanteric bursitis     leg length discrrepancy, hip pain     Past Surgical History:   Procedure Laterality Date     ARTHROPLASTY HIP Left 2016     BACK SURGERY      L4-5 decompression     C REPAIR DETACH RETINA,SCLERAL BUCKLE       CATARACT IOL, RT/LT       CHOLECYSTECTOMY N/A 1/28/2020    Procedure: Cholecystectomy;  Surgeon: Yandel Mallory MD;  [facility]     ESOPHAGOSCOPY, GASTROSCOPY, DUODENOSCOPY (EGD), COMBINED N/A 3/3/2020    Procedure: ESOPHAGOGASTRODUODENOSCOPY, WITH ENDOSCOPIC US (enoxaparin);  Surgeon: Bakari Plata MD;  [facility]     EYE SURGERY       IR CHEST PORT PLACEMENT > 5 YRS OF AGE  3/26/2020     OPEN REDUCTION INTERNAL FIXATION RODDING INTRAMEDULLARY FEMUR Left 12/23/2014    Procedure: OPEN REDUCTION INTERNAL FIXATION RODDING INTRAMEDULLARY FEMUR;  Surgeon: Arnol Santiago MD;  Location:  OR     ORTHOPEDIC SURGERY       PICC DOUBLE LUMEN PLACEMENT Right 02/07/2020    5 fr double lumen 41 cm     REMOVE HARDWARE LOWER EXTREMITY Left 4/7/2015    Procedure: REMOVE HARDWARE LOWER EXTREMITY;   Surgeon: Arnol Santiago MD;  Location: [facility]     REMOVE HARDWARE RODDING INTRAMEDULLARY FEMUR Left 12/17/2015    Procedure: REMOVE HARDWARE RODDING INTRAMEDULLARY FEMUR;  Surgeon: Arnol Santiago MD;  [facility]     RESECT BONE LOWER EXTREMITY Left 12/23/2014    Procedure: RESECT BONE LOWER EXTREMITY;  Surgeon: Arnol Santiago MD;  [facility]     WHIPPLE PROCEDURE N/A 1/28/2020    Procedure: Open Whipple procedure and Cholecystectomy;  Surgeon: Yandel Mallory MD;  [facility]       REVIEW OF SYSTEMS:   ROS: 10 point ROS neg other than the symptoms noted above in the HPI and here:  Palliative Symptom Review (0=no symptom/no concern, 1=mild, 2=moderate, 3=severe):      Pain: 1-2      Fatigue: 0      Nausea: 0      Constipation: 0      Diarrhea: 0      Depressive Symptoms: 0      Anxiety: 0      Drowsiness: 0      Poor Appetite: 0      Insomnia: 0 -occasionally wakes up with pain      Other: 0      Overall (0 good/no concerns, 3 very poor):  1    Constitutional: Sitting up in chair at home, comfortable-appearing, in no distress   Eyes: Conjunctiva clear. Sclera anicteric  Pulm: Normal work of breathing on room air  GI:  No bruising or focal swelling seen.  +mild tenderness to self-palpation over RUQ.  Well-healing midline incision scar.   Musculoskeletal: No tenderness to self-palpation of back  Psych: Alert, appropriately interactive, full affect, clear sensorium.     Data Reviewed:  LABS:   Recent Labs   Lab Test 04/13/20  1320 04/07/20  1550    137   POTASSIUM 3.7 3.2*   CHLORIDE 111* 106   CO2 28 25   ANIONGAP 4 7   * 106*   BUN 10 12   CR 0.81 0.84   PETRA 8.0* 8.4*     GFR >90  Alb 2.6  , ALT 25, AST 22  WBC 7.6, Hb 10.1 Plt 313    IMAGING:   CT A/P 4/13/20  FINDINGS:     Abdomen and pelvis:   Liver: No suspicious liver lesions. Portal veins appear patent.  Improvement in previously seen pneumobilia.  Gallbladder: Surgically absent.  Spleen: Normal size.  Pancreas:  Postsurgical changes of Whipple procedure. At the  pancreaticojejunostomy site, there is streaky soft tissue density,  measuring 1.6 x 1.6 cm (series 3, image 112). This was previously PET  avid on 3/13/2020 and has decreased slightly in size from that time.  No peripancreatic fluid collection or abscess. Stable dilation of the  pancreatic duct.  Adrenal glands: No adrenal nodules.  Kidneys: No kidney masses, hydronephrosis, or obstructing renal  stones. Improvement of mild right pelvocaliectasis.  Bladder / Pelvic organs: Evaluation is partially limited by beam  hardening artifact. The visualized portions are unremarkable.  Bowel: No bowel wall thickening. Colonic diverticulosis without  evidence of diverticulitis. The appendix is unremarkable.  Lymph nodes: No retroperitoneal, mesenteric, or pelvic  lymphadenopathy.  Fluid: No free fluid within the abdomen.  Vessels: No infrarenal aortic aneurysm. Moderate atherosclerotic  calcifications of the descending aorta.     Nodularity of the right lower quadrant mesentery is stable, measuring  2.5 x 1.8 cm (series 3, image 170).     Lung bases: No consolidation or pleural effusion.     Bones and soft tissues: Midline laparotomy incision. Postsurgical  changes of total left hip arthroplasty and left sacroiliac hardware.  No suspicious osseous lesions. Degenerative changes of the lumbar  spine                                                                   IMPRESSION: In this patient with history of gastric cancer status post  Whipple:  1.  No acute or suspicious abdominal pathology. Postsurgical changes  of Whipple without peripancreatic fluid collection or abscess.  2.  Stable soft tissue thickening at the pancreaticoduodenal  jejunostomy site, which was PET avid on 3/13/2020. This appears to  have decreased in size compared to prior postoperative imaging,  suggesting this represents resolving postoperative inflammation. Given  history of positive margins, residual  neoplasm is not excluded.  3.  Stable nodularity in the anterior right quadrant measuring 2.5 cm,  this appears to represent fat necrosis from previous fluid collection   4.  Stable pancreatic duct dilatation.  - Note mild amount of stool throughout bowel.     [place]  reviewed - Oxycodone 10 mg #120/month by Apurva Benjamin NP, last Rx 2/21/20, dating back to at least 3/2019. Had trial of Burprenorphine 2 mg SL 6/2019.     Since then has received #12 Percocet in ED, #15 Tramadol from Lisa Valencia PA-C in oncology.    Filled lorazepam #30 3/27, 4/13.     Impressions, Recommendations & Counseling:  Palliative Performance Score:  100  Decision Making Capacity:  Intact    Nathalie henry is a 54-year-old female with incidentally found T4 N0 M0 gastric adenocarcinoma, undergoing neoadjuvant chemo before completion gastrectomy.  Over the past week has developed new right upper quadrant pain which appears to be stable.  Had recent labs and CT which did not show any liver abnormalities or focal lesion on the CT to explain current pain.  Given her recent Whipple and cholecystectomy suspect this is related to postsurgical pain versus abdominal cramping as it also worsens with spicy food.  She otherwise has been tolerating chemo well, and is hopeful for eventual surgery with curative intent.  She seems to be coping well with her diagnosis.    RUQ Abdominal Pain -has previously had upset stomach with Tylenol but tolerated Percocet.  Want to avoid NSAIDs with recent gastritis.  - Recommend Tylenol 650 mg every 6 hours as needed, take with food to prevent upset stomach  - Start simethicone 80 mg every 6 hours as needed for bloating and postprandial cramping  -Continue cool compresses as needed  -If continues to have pain, would consider low-dose gabapentin for possible postsurgical nerve injury    Gastritis - continue BID pantoprazole and 4 times dailys sucralfate    Chronic neck, back pain - discussed that I would not prescribe opioids  for chronic pain, and if they were worsening, would recommend she goes back to her pain clinic.  Is doing well off of oxycodone for now.      Gastric cancer, following with Dr. Eagle and Dr. Mallory.  Tolerating chemo and coping well.    RTC in 6 weeks if worsening.     Video-Visit Details    Type of service:  Video Visit    Video End Time (time video stopped): 6:54-7:30    Originating Location (pt. Location): Home    Distant Location (provider location):  [facility]     Mode of Communication:  Video Conference via [facility]    Precious Scott MD  Palliative Medicine  Pager [phone]

## 2020-04-15 ENCOUNTER — TELEPHONE (OUTPATIENT)
Dept: ONCOLOGY | Facility: CLINIC | Age: 55
End: 2020-04-15

## 2020-04-15 ENCOUNTER — VIRTUAL VISIT (OUTPATIENT)
Dept: PALLIATIVE CARE | Facility: CLINIC | Age: 55
End: 2020-04-15
Attending: INTERNAL MEDICINE
Payer: MEDICARE

## 2020-04-15 DIAGNOSIS — K29.00 OTHER ACUTE GASTRITIS WITHOUT HEMORRHAGE: ICD-10-CM

## 2020-04-15 DIAGNOSIS — R14.0 BLOATING: ICD-10-CM

## 2020-04-15 DIAGNOSIS — R10.11 RUQ ABDOMINAL PAIN: Primary | ICD-10-CM

## 2020-04-15 DIAGNOSIS — C16.9 GASTRIC ADENOCARCINOMA (H): ICD-10-CM

## 2020-04-15 PROCEDURE — 99204 OFFICE O/P NEW MOD 45 MIN: CPT | Mod: GT | Performed by: INTERNAL MEDICINE

## 2020-04-15 PROCEDURE — 40000114 ZZH STATISTIC NO CHARGE CLINIC VISIT

## 2020-04-15 RX ORDER — ACETAMINOPHEN 325 MG/1
650 TABLET ORAL EVERY 6 HOURS PRN
Qty: 120 TABLET | Refills: 3 | Status: SHIPPED | OUTPATIENT
Start: 2020-04-15 | End: 2020-06-04

## 2020-04-15 NOTE — LETTER
Cancer Risk Management  Program [facility]  [facility]  [facility]  Mailing Address  Cancer Risk Management Program  [facility]  [address]  [address]  [place] [address]    New patient appointments  [phone]  July 27, 2020    Nathalie Bashir  [address] [id number]      Dear Nathalie,    It was a pleasure speaking with you on the phone on 4/15/2020. Here is a copy of the progress note from our discussion. If you have any additional questions, please feel free to call.    Referring Provider: Magaly Pollard MD    Presenting Information:  I spoke to Nathalie by phone today to discuss her genetic testing results. Her blood was drawn on 3/19/20. A CustomNext-Cancer panel (a combination of the CancerNext panel + the CTNNA1 gene + additional genes associated with colon polyps and melanoma) was ordered from [facility]. This testing was done because of Nathalie's personal and family history of cancer.    Genetic Testing Result: NEGATIVE  Nathalie is negative for mutations in the 41 genes analyzed: APC, ILIA, AXIN2, BAP1, BARD1, BMPR1A, BRCA1, BRCA2, BRIP1, CDH1, CDK4, CDKN2A, CHEK2, CTNNA1, DICER1, HOXB13, MLH1, MRE11A, MSH2, MSH3, MSH6, MUTYH, NBN, NF1, NTHL1, PALB2, PMS2, POLD1, POLE, PTEN, RAD50, RAD51C, RAD51D, RB1, SMAD4, SMARCA4, STK11 and TP53 (sequencing and deletion/duplication); MITF (sequencing only); EPCAM and GREM1 (deletion/duplication only).      Interpretation:  We discussed several different interpretations of this negative test result.    1. One explanation may be that there is a different gene or combination of genes and environment that are associated with the cancers in this family.  2. It is possible that her relatives have a mutation in one of these genes and she did not inherit it.  3. There  is also a small possibility that there is a mutation in one of these genes, and the testing laboratory could not find it with their current testing methods.       Screening:  Based on this negative test result, it is important for Nathalie and her relatives to refer back to the family history for appropriate cancer screening.      She should continue with her gastric cancer treatment as recommended by her oncology team.     She should also continue with her other cancer screenings including colonoscopies, mammograms, gynecologic exams, and skin exams. Other population cancer screening options, such as those recommended by the American Cancer Society and the National Comprehensive Cancer Network (NCCN), are also appropriate for Nathalie and her family. These screening recommendations may change if there are changes to Nathalie's personal and/or family history of cancer. Final screening recommendations should be made by each individual's managing physician.    Her relatives should make their physicians aware of their family history of diffuse gastric cancer. She also reported that her paternal uncle has a history of stomach cancer. It would be helpful to clarify the type of stomach cancer he had to further guide potential screening options for her family members. She is encouraged to contact me if she finds out more information about her uncle's diagnosis.      Inheritance:  We reviewed the autosomal dominant inheritance of mutations in these genes. We discussed that Nathalie cannot/did not pass on an identifiable mutation in these genes to her children based on this test result.  Mutations in these genes do not skip generations.      Summary:  We do not have an explanation for Nathalie's gastric cancer. While no genetic changes were identified, Nathalie may still be at risk for certain cancers due to family history, environmental factors, or other genetic causes not identified by this test.  Because of that, it is  important that she continue with cancer screening based on her personal and family history as discussed above.    Genetic testing is rapidly advancing, and new cancer susceptibility genes will most likely be identified in the future.  Therefore, I encouraged Nathalie to contact me annually or if there are changes in her personal or family history.  This may change how we assess her cancer risk, screening, and the testing we would offer.    Plan:  1. A copy of the test results will be mailed to Nathalie.  2. She plans to follow-up with her physicians.  3. She should contact me annually, or sooner if her family history changes.    If Nathalie has any further questions, I encouraged her to contact me at [phone].    Rosamaria Hickman MS, [facility]  Licensed Genetic Counselor  Office: [phone]

## 2020-04-15 NOTE — TELEPHONE ENCOUNTER
"4/15/2020    Referring Provider: Magaly Pollard MD    Presenting Information:  I spoke to Nathalie by phone today to discuss her genetic testing results. Her blood was drawn on 3/19/20. A CustomNext-Cancer panel (a combination of the CancerNext panel + the CTNNA1 gene + additional genes associated with colon polyps and melanoma) was ordered from [email or web address]. This testing was done because of Nathalie's personal and family history of cancer.    Genetic Testing Result: NEGATIVE  Nathalie is negative for mutations in the 41 genes analyzed: APC, ILIA, AXIN2, BAP1, BARD1, BMPR1A, BRCA1, BRCA2, BRIP1, CDH1, CDK4, CDKN2A, CHEK2, CTNNA1, DICER1, HOXB13, MLH1, MRE11A, MSH2, MSH3, MSH6, MUTYH, NBN, NF1, NTHL1, PALB2, PMS2, POLD1, POLE, PTEN, RAD50, RAD51C, RAD51D, RB1, SMAD4, SMARCA4, STK11 and TP53 (sequencing and deletion/duplication); MITF (sequencing only); EPCAM and GREM1 (deletion/duplication only).      A copy of the test report can be found in the Laboratory tab, dated 3/19/20, and named \"SEND OUTS MISC TEST\". The report is scanned in as a linked document.    Interpretation:  We discussed several different interpretations of this negative test result.    1. One explanation may be that there is a different gene or combination of genes and environment that are associated with the cancers in this family.  2. It is possible that her relatives have a mutation in one of these genes and she did not inherit it.  3. There is also a small possibility that there is a mutation in one of these genes, and the testing laboratory could not find it with their current testing methods.       Screening:  Based on this negative test result, it is important for Nathalie and her relatives to refer back to the family history for appropriate cancer screening.      She should continue with her gastric cancer treatment as recommended by her oncology team.     She should also continue with her other cancer screenings including colonoscopies, " mammograms, gynecologic exams, and skin exams. Other population cancer screening options, such as those recommended by the American Cancer Society and the National Comprehensive Cancer Network (NCCN), are also appropriate for Nathalie and her family. These screening recommendations may change if there are changes to Nathalie's personal and/or family history of cancer. Final screening recommendations should be made by each individual's managing physician.    Her relatives should make their physicians aware of their family history of diffuse gastric cancer. She also reported that her paternal uncle has a history of stomach cancer. It would be helpful to clarify the type of stomach cancer he had to further guide potential screening options for her family members. She is encouraged to contact me if she finds out more information about her uncle's diagnosis.      Inheritance:  We reviewed the autosomal dominant inheritance of mutations in these genes. We discussed that Nathalie cannot/did not pass on an identifiable mutation in these genes to her children based on this test result.  Mutations in these genes do not skip generations.      Summary:  We do not have an explanation for Nathalie's gastric cancer. While no genetic changes were identified, Nathalie may still be at risk for certain cancers due to family history, environmental factors, or other genetic causes not identified by this test.  Because of that, it is important that she continue with cancer screening based on her personal and family history as discussed above.    Genetic testing is rapidly advancing, and new cancer susceptibility genes will most likely be identified in the future.  Therefore, I encouraged Nathalie to contact me annually or if there are changes in her personal or family history.  This may change how we assess her cancer risk, screening, and the testing we would offer.    Plan:  1. A copy of the test results will be mailed to Nathalie.  2. She  plans to follow-up with her physicians.  3. She should contact me annually, or sooner if her family history changes.    If Nathalie has any further questions, I encouraged her to contact me at [phone].    Time spent on the phone: 5 minutes.    Rosamaria Hickman MS, [facility]  Licensed Genetic Counselor  Office: [phone]

## 2020-04-15 NOTE — Clinical Note
Please send copy of letter to patient with test results. Please enclose test results: Send outs misc test [[id number]] (Order [id number])

## 2020-04-15 NOTE — PATIENT INSTRUCTIONS
Thank you for coming into the Palliative Care Clinic today.      1. For abdominal pain - recommend taking tylenol 650 mg up to every 6 hours with food.  Continue cool compresses as needed.  Can also take simethicone 80 mg up to 4x/day as needed for bloating, gas which may help that fullness you feel.   Since the tramadol did not seem to help, can stop that.      2. Continue taking the pantoprazole twice/day and sucralfate up to 4x/day as needed.  If the metoclopramide is not helping, can stop that.      Return in clinic in 6 weeks if needed for a follow-up.      You can reach the Palliative Care Team during business hours at the following numbers:   -For the [facility], call [phone]  -To reach the palliative RN for refills or questions, call [phone]     To reach the Palliative Care Provider on-call After-hours or on holidays and weekends, call: [phone].  Please note that we are not able to provide pain medication refills on evenings or weekends.

## 2020-04-19 NOTE — PROGRESS NOTES
"Nathalie Bashir is a 54 year old female who is being evaluated via a billable video visit.      The patient has been notified of following:     \"This video visit will be conducted via a call between you and your physician/provider. We have found that certain health care needs can be provided without the need for an in-person physical exam.  This service lets us provide the care you need with a video conversation.  If a prescription is necessary we can send it directly to your pharmacy.  If lab work is needed we can place an order for that and you can then stop by our lab to have the test done at a later time.    Video visits are billed at different rates depending on your insurance coverage.  Please reach out to your insurance provider with any questions.    If during the course of the call the physician/provider feels a video visit is not appropriate, you will not be charged for this service.\"    Patient has given verbal consent for Video visit? Yes    How would you like to obtain your AVS? Levihar    Patient would like the video invitation sent by: Text to cell phone: [phone]       Refills: Carafate.    Additional concerns: States upset stomach is still ongoing.     Video Start Time:NA-had to do telephone visit due to technologic issues with getting cell phone to work in clinic    Additional provider notes:     Oncologic History  Ms. Bashir is a 54 year old female with a history of gastric adenocarcinoma.  She was being followed for a pancreatic duct dilatation and pancreatic cyst which was noted in November 2018.  Since May 2019 she started noticing some nausea and vomiting and occasional abdominal discomfort.  She had an ultrasound in August 2019 which was essentially unremarkable.  She had a repeat endoscopic ultrasound on 10/29/2019 which showed increase in size of the cyst as well as dilation of the main pancreatic duct.  FNA and fluid analysis showed mucinous epithelium.  She was referred to Dr. 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  Surprisingly there was no pancreatic ductal neoplasm seen but on the resected specimen stomach cancer was noted.  It was poorly differentiated adenocarcinoma with poorly cohesive/signet ring cell type with proximal gastric mucosal and serosal margins being positive.  There was lymphovascular and perineural invasion seen.  22 lymph nodes were sampled and all were benign.  It was a pT4aN0 lesion.  HER-2/christine FISH was not amplified.         EUS on 3/3/2020 without clear evidence of neoplasm endoscopically. Left gastric lymph node fine-needle aspiration was negative for carcinoma. Biopsy from the gastric jejunal anastomosis as well as lesser curvature of the stomach also did not show any malignancy.     She had a PET/CT on 3/13/2020 which showed soft tissue streaky density with increased FDG uptake adjacent to the pancreaticojejunostomy which could be neoplastic in origin.  There is mild increased FDG uptake in the gastric remnant.  Focal area of soft tissue thickening with fatty streakiness along medial laparotomy with increased FDG uptake which likely is inflammatory. Increased FDG uptake in thoracic esophagus which could be esophagitis.     She started on chemotherapy with 5FU, oxaliplatin, and Taxotere on 3/27/20. She was seen on 4/7/20 for evaluation of fevers. She was treated for possible pneumonia with Levaquin.     She was seen in clinic 4/13/20 for RUQ pain,CT and labs reassuring.     She was called today for oncology follow-up prior to cycle 2 5FU, Oxaliplatin, and Taxotere.     Interval History:  Ms. Bashir was called today for oncology follow-up. Unable to do video visit due to poor cell service in clinic and had to use infusion phone for call. She overall is doing OK. Her main complaint is ongoing epigastric and RUQ pain that is constant but worse after eating. She has been doing Pantoprazole BID and Carafate though still is having  discomfort. She also is having a lot of acid reflux and gas. Simethincone helps slightly. Tramadol does help with the pain. Tylenol seems to upset her stomach more.    She otherwise is doing OK. She is starting to lose her hair but denies any pain with this. No recurrent fevers since completing abx for pneumonia and her cough is much better. No chest pain, SOB. She denies any nausea or vomiting. She does get full quickly and is taking Reglan. She did have diarrhea for 3-4 days after treatment that improved with Imodium. No headaches or dizziness.    She denies any edema, hypersensitivity reactions, or rashes after Docetaxel. She does have tingling in her feet which was present before chemo and stable. She did have cold sensitivity x 1 week, now resolved. No mouth sores.     Of note she was confused about the dexamethasone instructions and did not take it yesterday or today.    Objective:  Vitals: 95/65 HR 97 Temp 98.3 RR 18 Weight 181lb Pain 6/10 SpO2 96%    Well sounding female on phone. Good voice quality. Normal speech and thought process.    Labs reviewed:   Results for TIKI ALONSO (MRN [id number]) as of 4/20/2020 09:04   4/20/2020 07:22   Sodium 138   Potassium 4.2   Chloride 106   Carbon Dioxide 29   Urea Nitrogen 12   Creatinine 0.88   GFR Estimate 75   GFR Estimate If Black 87   Calcium 9.1   Anion Gap 4   Albumin 3.1 (L)   Protein Total 7.1   Bilirubin Total 0.2   Alkaline Phosphatase 164 (H)   ALT 78 (H)   AST 81 (H)   Glucose 96   WBC 11.0   Hemoglobin 11.1 (L)   Hematocrit 34.8 (L)   Platelet Count 341   RBC Count 4.03   MCV 86   MCH 27.5   MCHC 31.9   RDW 16.3 (H)   Diff Method Automated Method   % Neutrophils 66.8   % Lymphocytes 21.1   % Monocytes 6.3   % Eosinophils 4.8   % Basophils 0.5   % Immature Granulocytes 0.5   Nucleated RBCs 0   Absolute Neutrophil 7.4   Absolute Lymphocytes 2.3   Absolute Monocytes 0.7   Absolute Eosinophils 0.5   Absolute Basophils 0.1   Abs Immature Granulocytes 0.1    Absolute Nucleated RBC 0.0     Assessment and Plan:  1. 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 This was found on Whipple surgery specimen as it was done for presumed pancreatic IPMN although no pancreatic neoplasm was found.  It was a pT4aN0 lesion with lymphovascular invasion and perineural invasion seen. All 22 lymph nodes were negative.    She was started on neoadjuvant chemotherapy with FLOT (5FU, Oxaliplatin, Docetaxel) 3/37/20. Cycle 2 delayed for pneumonia, now recovered.    Returns today for cycle 2. Overall feeling well, labs stable other than slight LFT elevation we will monitor. Will go ahead with cycle 2. Discussed hair loss and cold sensitivity are common.    Due to gastritis issues and minimal issues with cycle 1 will reduce Dex to 4mg day before, day of, and 2 days after chemotherapy. Keep IV dex at 20mg today since she didn't take any PO dex and if does well can reduce IV dex next cycle.     Plan for 2 months of chemotherapy before repeating scans.    2. GI  Gastritis: Ongoing, worse with high dose steroids, adjusting as above. Continue Pantoprazole 40mg BID and start Famotidine 20mg BID. Continue Carafate and Simethicone PRN. Also added Pepcid to pre-meds.    RUQ/epigastric pain: Has had CT imaging with no acute findings. Suspect post surgical plus gastritis. OK to continue Tramadol PRN for now with goal that adjustment in gastritis meds will improve pain.    Diarrhea: Isolated after 1st cycle, can use Imodium PRN.    Early Satiety: Continue Reglan PRN    3. ID  Prior pneumonia symptoms resolved. Monitor for any recurrent infectious symptoms.    Changed to phone visit due to technologic issues. Phone call 18 min    RAHEEL Ying

## 2020-04-20 ENCOUNTER — INFUSION THERAPY VISIT (OUTPATIENT)
Dept: ONCOLOGY | Facility: CLINIC | Age: 55
End: 2020-04-20
Attending: INTERNAL MEDICINE
Payer: MEDICARE

## 2020-04-20 ENCOUNTER — APPOINTMENT (OUTPATIENT)
Dept: LAB | Facility: CLINIC | Age: 55
End: 2020-04-20
Attending: INTERNAL MEDICINE
Payer: MEDICARE

## 2020-04-20 VITALS
TEMPERATURE: 98.3 F | DIASTOLIC BLOOD PRESSURE: 65 MMHG | RESPIRATION RATE: 18 BRPM | WEIGHT: 181.6 LBS | SYSTOLIC BLOOD PRESSURE: 95 MMHG | HEART RATE: 97 BPM | OXYGEN SATURATION: 96 % | BODY MASS INDEX: 27.61 KG/M2

## 2020-04-20 DIAGNOSIS — R10.11 RUQ ABDOMINAL PAIN: ICD-10-CM

## 2020-04-20 DIAGNOSIS — D49.0 IPMN (INTRADUCTAL PAPILLARY MUCINOUS NEOPLASM): ICD-10-CM

## 2020-04-20 DIAGNOSIS — C16.3 MALIGNANT NEOPLASM OF PYLORIC ANTRUM (H): ICD-10-CM

## 2020-04-20 DIAGNOSIS — R10.13 ABDOMINAL PAIN, EPIGASTRIC: ICD-10-CM

## 2020-04-20 DIAGNOSIS — C16.9 GASTRIC ADENOCARCINOMA (H): Primary | ICD-10-CM

## 2020-04-20 DIAGNOSIS — C16.9 GASTRIC ADENOCARCINOMA (H): ICD-10-CM

## 2020-04-20 DIAGNOSIS — K29.60 OTHER GASTRITIS WITHOUT HEMORRHAGE, UNSPECIFIED CHRONICITY: Primary | ICD-10-CM

## 2020-04-20 LAB
ALBUMIN SERPL-MCNC: 3.1 G/DL (ref 3.4–5)
ALP SERPL-CCNC: 164 U/L (ref 40–150)
ALT SERPL W P-5'-P-CCNC: 78 U/L (ref 0–50)
ANION GAP SERPL CALCULATED.3IONS-SCNC: 4 MMOL/L (ref 3–14)
AST SERPL W P-5'-P-CCNC: 81 U/L (ref 0–45)
BASOPHILS # BLD AUTO: 0.1 10E9/L (ref 0–0.2)
BASOPHILS NFR BLD AUTO: 0.5 %
BILIRUB SERPL-MCNC: 0.2 MG/DL (ref 0.2–1.3)
BUN SERPL-MCNC: 12 MG/DL (ref 7–30)
CALCIUM SERPL-MCNC: 9.1 MG/DL (ref 8.5–10.1)
CHLORIDE SERPL-SCNC: 106 MMOL/L (ref 94–109)
CO2 SERPL-SCNC: 29 MMOL/L (ref 20–32)
CREAT SERPL-MCNC: 0.88 MG/DL (ref 0.52–1.04)
DIFFERENTIAL METHOD BLD: ABNORMAL
EOSINOPHIL # BLD AUTO: 0.5 10E9/L (ref 0–0.7)
EOSINOPHIL NFR BLD AUTO: 4.8 %
ERYTHROCYTE [DISTWIDTH] IN BLOOD BY AUTOMATED COUNT: 16.3 % (ref 10–15)
GFR SERPL CREATININE-BSD FRML MDRD: 75 ML/MIN/{1.73_M2}
GLUCOSE SERPL-MCNC: 96 MG/DL (ref 70–99)
HCT VFR BLD AUTO: 34.8 % (ref 35–47)
HGB BLD-MCNC: 11.1 G/DL (ref 11.7–15.7)
IMM GRANULOCYTES # BLD: 0.1 10E9/L (ref 0–0.4)
IMM GRANULOCYTES NFR BLD: 0.5 %
LYMPHOCYTES # BLD AUTO: 2.3 10E9/L (ref 0.8–5.3)
LYMPHOCYTES NFR BLD AUTO: 21.1 %
MCH RBC QN AUTO: 27.5 PG (ref 26.5–33)
MCHC RBC AUTO-ENTMCNC: 31.9 G/DL (ref 31.5–36.5)
MCV RBC AUTO: 86 FL (ref 78–100)
MONOCYTES # BLD AUTO: 0.7 10E9/L (ref 0–1.3)
MONOCYTES NFR BLD AUTO: 6.3 %
NEUTROPHILS # BLD AUTO: 7.4 10E9/L (ref 1.6–8.3)
NEUTROPHILS NFR BLD AUTO: 66.8 %
NRBC # BLD AUTO: 0 10*3/UL
NRBC BLD AUTO-RTO: 0 /100
PLATELET # BLD AUTO: 341 10E9/L (ref 150–450)
POTASSIUM SERPL-SCNC: 4.2 MMOL/L (ref 3.4–5.3)
PROT SERPL-MCNC: 7.1 G/DL (ref 6.8–8.8)
RBC # BLD AUTO: 4.03 10E12/L (ref 3.8–5.2)
SODIUM SERPL-SCNC: 138 MMOL/L (ref 133–144)
WBC # BLD AUTO: 11 10E9/L (ref 4–11)

## 2020-04-20 PROCEDURE — 25000128 H RX IP 250 OP 636: Mod: JW,ZF | Performed by: PHYSICIAN ASSISTANT

## 2020-04-20 PROCEDURE — 96368 THER/DIAG CONCURRENT INF: CPT

## 2020-04-20 PROCEDURE — 96375 TX/PRO/DX INJ NEW DRUG ADDON: CPT

## 2020-04-20 PROCEDURE — 25800030 ZZH RX IP 258 OP 636: Mod: ZF | Performed by: PHYSICIAN ASSISTANT

## 2020-04-20 PROCEDURE — 85025 COMPLETE CBC W/AUTO DIFF WBC: CPT | Performed by: INTERNAL MEDICINE

## 2020-04-20 PROCEDURE — 96413 CHEMO IV INFUSION 1 HR: CPT

## 2020-04-20 PROCEDURE — 99442 ZZC PHYSICIAN TELEPHONE EVALUATION 11-20 MIN: CPT | Performed by: PHYSICIAN ASSISTANT

## 2020-04-20 PROCEDURE — 25000128 H RX IP 250 OP 636: Mod: ZF | Performed by: INTERNAL MEDICINE

## 2020-04-20 PROCEDURE — 80053 COMPREHEN METABOLIC PANEL: CPT | Performed by: INTERNAL MEDICINE

## 2020-04-20 PROCEDURE — G0498 CHEMO EXTEND IV INFUS W/PUMP: HCPCS

## 2020-04-20 PROCEDURE — 96415 CHEMO IV INFUSION ADDL HR: CPT

## 2020-04-20 PROCEDURE — 25000125 ZZHC RX 250: Mod: ZF | Performed by: PHYSICIAN ASSISTANT

## 2020-04-20 PROCEDURE — 96417 CHEMO IV INFUS EACH ADDL SEQ: CPT

## 2020-04-20 PROCEDURE — 96367 TX/PROPH/DG ADDL SEQ IV INF: CPT

## 2020-04-20 RX ORDER — METOCLOPRAMIDE 10 MG/1
10 TABLET ORAL 4 TIMES DAILY PRN
Qty: 90 TABLET | Refills: 3 | Status: ON HOLD | OUTPATIENT
Start: 2020-04-20 | End: 2020-05-23

## 2020-04-20 RX ORDER — EPINEPHRINE 1 MG/ML
0.3 INJECTION, SOLUTION INTRAMUSCULAR; SUBCUTANEOUS EVERY 5 MIN PRN
Status: CANCELLED | OUTPATIENT
Start: 2020-04-20

## 2020-04-20 RX ORDER — SUCRALFATE ORAL 1 G/10ML
1 SUSPENSION ORAL 4 TIMES DAILY PRN
Qty: 420 ML | Refills: 3 | Status: ON HOLD | OUTPATIENT
Start: 2020-04-20 | End: 2020-05-23

## 2020-04-20 RX ORDER — SODIUM CHLORIDE 9 MG/ML
1000 INJECTION, SOLUTION INTRAVENOUS CONTINUOUS PRN
Status: CANCELLED
Start: 2020-04-20

## 2020-04-20 RX ORDER — EPINEPHRINE 0.3 MG/.3ML
0.3 INJECTION SUBCUTANEOUS EVERY 5 MIN PRN
Status: CANCELLED | OUTPATIENT
Start: 2020-04-20

## 2020-04-20 RX ORDER — METHYLPREDNISOLONE SODIUM SUCCINATE 125 MG/2ML
125 INJECTION, POWDER, LYOPHILIZED, FOR SOLUTION INTRAMUSCULAR; INTRAVENOUS
Status: CANCELLED
Start: 2020-04-20

## 2020-04-20 RX ORDER — ALBUTEROL SULFATE 0.83 MG/ML
2.5 SOLUTION RESPIRATORY (INHALATION)
Status: CANCELLED | OUTPATIENT
Start: 2020-04-20

## 2020-04-20 RX ORDER — TRAMADOL HYDROCHLORIDE 50 MG/1
50 TABLET ORAL EVERY 6 HOURS PRN
Qty: 20 TABLET | Refills: 0 | Status: SHIPPED | OUTPATIENT
Start: 2020-04-20 | End: 2020-05-04

## 2020-04-20 RX ORDER — DIPHENHYDRAMINE HYDROCHLORIDE 50 MG/ML
50 INJECTION INTRAMUSCULAR; INTRAVENOUS
Status: CANCELLED
Start: 2020-04-20

## 2020-04-20 RX ORDER — DEXAMETHASONE 4 MG/1
TABLET ORAL
Qty: 12 TABLET | Refills: 3 | Status: SHIPPED | OUTPATIENT
Start: 2020-04-20 | End: 2020-09-24

## 2020-04-20 RX ORDER — NALOXONE HYDROCHLORIDE 0.4 MG/ML
.1-.4 INJECTION, SOLUTION INTRAMUSCULAR; INTRAVENOUS; SUBCUTANEOUS
Status: CANCELLED | OUTPATIENT
Start: 2020-04-20

## 2020-04-20 RX ORDER — FAMOTIDINE 20 MG/1
20 TABLET, FILM COATED ORAL 2 TIMES DAILY
Qty: 60 TABLET | Refills: 3 | Status: ON HOLD | OUTPATIENT
Start: 2020-04-20 | End: 2020-10-26

## 2020-04-20 RX ORDER — ALBUTEROL SULFATE 90 UG/1
1-2 AEROSOL, METERED RESPIRATORY (INHALATION)
Status: CANCELLED
Start: 2020-04-20

## 2020-04-20 RX ORDER — LORAZEPAM 2 MG/ML
0.5 INJECTION INTRAMUSCULAR EVERY 4 HOURS PRN
Status: CANCELLED
Start: 2020-04-20

## 2020-04-20 RX ORDER — HEPARIN SODIUM (PORCINE) LOCK FLUSH IV SOLN 100 UNIT/ML 100 UNIT/ML
5 SOLUTION INTRAVENOUS EVERY 8 HOURS
Status: DISCONTINUED | OUTPATIENT
Start: 2020-04-20 | End: 2020-04-20 | Stop reason: HOSPADM

## 2020-04-20 RX ORDER — MEPERIDINE HYDROCHLORIDE 25 MG/ML
25 INJECTION INTRAMUSCULAR; INTRAVENOUS; SUBCUTANEOUS EVERY 30 MIN PRN
Status: CANCELLED | OUTPATIENT
Start: 2020-04-20

## 2020-04-20 RX ADMIN — OXALIPLATIN 169 MG: 100 INJECTION, SOLUTION, CONCENTRATE INTRAVENOUS at 11:14

## 2020-04-20 RX ADMIN — DEXTROSE MONOHYDRATE 250 ML: 50 INJECTION, SOLUTION INTRAVENOUS at 11:10

## 2020-04-20 RX ADMIN — SODIUM CHLORIDE 250 ML: 9 INJECTION, SOLUTION INTRAVENOUS at 09:05

## 2020-04-20 RX ADMIN — Medication 5 ML: at 07:22

## 2020-04-20 RX ADMIN — DEXAMETHASONE SODIUM PHOSPHATE: 10 INJECTION, SOLUTION INTRAMUSCULAR; INTRAVENOUS at 09:12

## 2020-04-20 RX ADMIN — SODIUM CHLORIDE 100 MG: 9 INJECTION, SOLUTION INTRAVENOUS at 09:51

## 2020-04-20 RX ADMIN — LEUCOVORIN CALCIUM 400 MG: 200 INJECTION, POWDER, LYOPHILIZED, FOR SOLUTION INTRAMUSCULAR; INTRAVENOUS at 11:10

## 2020-04-20 RX ADMIN — FAMOTIDINE 20 MG: 10 INJECTION INTRAVENOUS at 09:34

## 2020-04-20 ASSESSMENT — PAIN SCALES - GENERAL: PAINLEVEL: SEVERE PAIN (6)

## 2020-04-20 NOTE — NURSING NOTE
Chief Complaint   Patient presents with     Port Draw     power needle. heparin locked, vitals checked     Emma Rivera RN on 4/20/2020 at 7:24 AM

## 2020-04-20 NOTE — PATIENT INSTRUCTIONS
Your Pump disconnect will be on Tuesday, 4/21 at ___________    Stomach Pain  -Continue Pantoprazole twice daily  -Add Famotidine (Pepcid) twice daily. This is another acid blocking medication  -Continue Sucralfate (Carafate) as needed up to 4x per day  -Continue Simethicone (Gas-X) as needed up to 4x per day  -Continue Tylenol as needed if it doesn't upset your stomach  -Did refill Tramadol to use for severe pain     Early Satiety (getting full quickly)  -Do smaller, more frequent meals and make sure you are staying well hydrated  -Continue metoclopramide (Reglen) up to 4x daily as needed     Chemo Meds  -Reducing the Dexamethasone (steroid) that you do with treatment to help lessen stomach irritation. Take 1 tablet (4mg) the day before, day of, and 2 days after chemotherapy in the AM with food. Since you didn't have it for today, you can just take the 2 days afterwards. You will have enough for next treatment to start the day before.      Other  OK to shave your head if it is more comfortable     Keep pushing fluids, especially if you have looser stools. OK to use Imodium as needed for diarrhea     Cold sensitivity and numbness/tingling is common and we watch this with treatment to make sure it isn't getting worse      [facility] Triage and after hours / weekends / holidays:  [phone]    Please call the triage or after hours line if you experience a temperature greater than or equal to 100.5, shaking chills, have uncontrolled nausea, vomiting and/or diarrhea, dizziness, shortness of breath, chest pain, bleeding, unexplained bruising, or if you have any other new/concerning symptoms, questions or concerns.      If you are having any concerning symptoms or wish to speak to a provider before your next infusion visit, please call your care coordinator or triage to notify them so we can adequately serve you.     If you need a refill on a narcotic prescription or other medication, please call before your infusion  appointment.     Recent Results (from the past 24 hour(s))   CBC with platelets differential    Collection Time: 04/20/20  7:22 AM   Result Value Ref Range    WBC 11.0 4.0 - 11.0 10e9/L    RBC Count 4.03 3.8 - 5.2 10e12/L    Hemoglobin 11.1 (L) 11.7 - 15.7 g/dL    Hematocrit 34.8 (L) 35.0 - 47.0 %    MCV 86 78 - 100 fl    MCH 27.5 26.5 - 33.0 pg    MCHC 31.9 31.5 - 36.5 g/dL    RDW 16.3 (H) 10.0 - 15.0 %    Platelet Count 341 150 - 450 10e9/L    Diff Method Automated Method     % Neutrophils 66.8 %    % Lymphocytes 21.1 %    % Monocytes 6.3 %    % Eosinophils 4.8 %    % Basophils 0.5 %    % Immature Granulocytes 0.5 %    Nucleated RBCs 0 0 /100    Absolute Neutrophil 7.4 1.6 - 8.3 10e9/L    Absolute Lymphocytes 2.3 0.8 - 5.3 10e9/L    Absolute Monocytes 0.7 0.0 - 1.3 10e9/L    Absolute Eosinophils 0.5 0.0 - 0.7 10e9/L    Absolute Basophils 0.1 0.0 - 0.2 10e9/L    Abs Immature Granulocytes 0.1 0 - 0.4 10e9/L    Absolute Nucleated RBC 0.0    Comprehensive metabolic panel    Collection Time: 04/20/20  7:22 AM   Result Value Ref Range    Sodium 138 133 - 144 mmol/L    Potassium 4.2 3.4 - 5.3 mmol/L    Chloride 106 94 - 109 mmol/L    Carbon Dioxide 29 20 - 32 mmol/L    Anion Gap 4 3 - 14 mmol/L    Glucose 96 70 - 99 mg/dL    Urea Nitrogen 12 7 - 30 mg/dL    Creatinine 0.88 0.52 - 1.04 mg/dL    GFR Estimate 75 >60 mL/min/[1.73_m2]    GFR Estimate If Black 87 >60 mL/min/[1.73_m2]    Calcium 9.1 8.5 - 10.1 mg/dL    Bilirubin Total 0.2 0.2 - 1.3 mg/dL    Albumin 3.1 (L) 3.4 - 5.0 g/dL    Protein Total 7.1 6.8 - 8.8 g/dL    Alkaline Phosphatase 164 (H) 40 - 150 U/L    ALT 78 (H) 0 - 50 U/L    AST 81 (H) 0 - 45 U/L

## 2020-04-20 NOTE — PATIENT INSTRUCTIONS
Stomach Pain  -Continue Pantoprazole twice daily  -Add Famotidine (Pepcid) twice daily. This is another acid blocking medication  -Continue Sucralfate (Carafate) as needed up to 4x per day  -Continue Simethicone (Gas-X) as needed up to 4x per day  -Continue Tylenol as needed if it doesn't upset your stomach  -Did refill Tramadol to use for severe pain    Early Satiety (getting full quickly)  -Do smaller, more frequent meals and make sure you are staying well hydrated  -Continue metoclopramide (Reglen) up to 4x daily as needed    Chemo Meds  -Reducing the Dexamethasone (steroid) that you do with treatment to help lessen stomach irritation. Take 1 tablet (4mg) the day before, day of, and 2 days after chemotherapy in the AM with food. Since you didn't have it for today, you can just take the 2 days afterwards. You will have enough for next treatment to start the day before.     Other  OK to shave your head if it is more comfortable    Keep pushing fluids, especially if you have looser stools. OK to use Imodium as needed for diarrhea    Cold sensitivity and numbness/tingling is common and we watch this with treatment to make sure it isn't getting worse

## 2020-04-20 NOTE — PROGRESS NOTES
Infusion Nursing Note:  Nathalie Bashir presents today for Cycle 2 Day 1 Taxotere, Oxaliplatin, Leucovorin, Fluorouracil pump connection.    Patient seen by provider today: Yes: RAHEEL Ying    Note: TORB 4/20/20 0850 Vazquez PICKERING/Rosa Ace RN: OK to treat today. Give pepcid with premedications. Patient to take po dexamethasone 4mg tablets tomorrow and Wednesday. Ok to give zofran with premedications.     Patient verbalized understanding of how to take po dexamethasone. Educated to start taking again on May 3rd, the day before her next treatment. Written instructions from Vazquez PICKERING reviewed with patient.     Taxotere infused at the following rates:   800mL/hr for 14 mL  10mL/hr for 5 minutes  25mL/hr for 5 minutes  50mL/hr for 5 minutes  100mL/hr for 5 minutes  280mL/hr max rate for remainder of infusion    Intravenous Access:  Implanted Port.    Treatment Conditions:  Lab Results   Component Value Date    HGB 11.1 04/20/2020     Lab Results   Component Value Date    WBC 11.0 04/20/2020      Lab Results   Component Value Date    ANEU 7.4 04/20/2020     Lab Results   Component Value Date     04/20/2020      Lab Results   Component Value Date     04/20/2020                   Lab Results   Component Value Date    POTASSIUM 4.2 04/20/2020           Lab Results   Component Value Date    MAG 2.0 03/31/2020            Lab Results   Component Value Date    CR 0.88 04/20/2020                   Lab Results   Component Value Date    PETRA 9.1 04/20/2020                Lab Results   Component Value Date    BILITOTAL 0.2 04/20/2020           Lab Results   Component Value Date    ALBUMIN 3.1 04/20/2020                    Lab Results   Component Value Date    ALT 78 04/20/2020           Lab Results   Component Value Date    AST 81 04/20/2020     Results reviewed, labs MET treatment parameters, ok to proceed with treatment.      Post Infusion Assessment:  Patient tolerated infusion without  "incident.  Blood return noted pre and post infusion.  Site patent and intact, free from redness, edema or discomfort.       Prior to discharge: Port is secured in place with tegaderm and flushed with 10cc NS with positive blood return noted. Continuous home infusion 24 hour CADD pump connected.    All connectors secured in place and clamps taped open.    Pump started, \"running\" noted on display (CADD): YES. Pump and connections double checked with Stephanie Delarosa RN.  Patient instructed to call our clinic or [facility] with any questions or concerns at home.  Patient verbalized understanding.    Patient set up for pump disconnect at our clinic on 4/21/20 at 1330.        Discharge Plan:   Prescription refills given for dexamethasone, pepcid, reglan, carafate, tramadol.  Discharge instructions reviewed with: Patient.  Patient verbalized understanding of discharge instructions and all questions answered.  Copy of AVS reviewed with patient. Patient will return 5/4/20 for next appointment.  Patient discharged in stable condition accompanied by: self.  Face to Face time: 0.    JENNIFER DRAKE RN                      "

## 2020-04-21 ENCOUNTER — INFUSION THERAPY VISIT (OUTPATIENT)
Dept: ONCOLOGY | Facility: CLINIC | Age: 55
End: 2020-04-21
Attending: INTERNAL MEDICINE
Payer: MEDICARE

## 2020-04-21 VITALS
RESPIRATION RATE: 16 BRPM | DIASTOLIC BLOOD PRESSURE: 70 MMHG | HEART RATE: 99 BPM | OXYGEN SATURATION: 97 % | TEMPERATURE: 98 F | SYSTOLIC BLOOD PRESSURE: 113 MMHG

## 2020-04-21 DIAGNOSIS — C16.9 GASTRIC ADENOCARCINOMA (H): Primary | ICD-10-CM

## 2020-04-21 PROCEDURE — 25000128 H RX IP 250 OP 636: Mod: ZF | Performed by: INTERNAL MEDICINE

## 2020-04-21 PROCEDURE — 40000114 ZZH STATISTIC NO CHARGE CLINIC VISIT

## 2020-04-21 RX ORDER — HEPARIN SODIUM (PORCINE) LOCK FLUSH IV SOLN 100 UNIT/ML 100 UNIT/ML
5 SOLUTION INTRAVENOUS
Status: DISCONTINUED | OUTPATIENT
Start: 2020-04-21 | End: 2020-04-21 | Stop reason: HOSPADM

## 2020-04-21 RX ADMIN — Medication 5 ML: at 13:37

## 2020-04-21 ASSESSMENT — PAIN SCALES - GENERAL: PAINLEVEL: NO PAIN (0)

## 2020-04-21 NOTE — PROGRESS NOTES
Infusion Nursing Note:  Nathalie Bashir presents today for Cycle 2 Day 2 Fluorouracil pump disconnect.    Patient seen by provider today: No    Treatment Conditions:  Lab Results   Component Value Date    HGB 11.1 04/20/2020     Lab Results   Component Value Date    WBC 11.0 04/20/2020      Lab Results   Component Value Date    ANEU 7.4 04/20/2020     Lab Results   Component Value Date     04/20/2020      Lab Results   Component Value Date     04/20/2020                   Lab Results   Component Value Date    POTASSIUM 4.2 04/20/2020           Lab Results   Component Value Date    MAG 2.0 03/31/2020            Lab Results   Component Value Date    CR 0.88 04/20/2020                   Lab Results   Component Value Date    PETRA 9.1 04/20/2020                Lab Results   Component Value Date    BILITOTAL 0.2 04/20/2020           Lab Results   Component Value Date    ALBUMIN 3.1 04/20/2020                    Lab Results   Component Value Date    ALT 78 04/20/2020           Lab Results   Component Value Date    AST 81 04/20/2020           Note: Denies fever/chills. Pt reports occasional nausea, relieved with antiemetics at home.  Encouraged PO fluids and to call with worsening symptoms.     Intravenous Access:  Implanted Port.    Post Infusion Assessment:  Patient tolerated infusion without incident.  Blood return noted pre and post infusion.  Access discontinued per protocol.    Discharge Plan:   Patient declined prescription refills.  Discharge instructions reviewed with: Patient.  Patient and/or family verbalized understanding of discharge instructions and all questions answered.  AVS to patient via [employer].  Patient will return 5/4/20 for next appointment.   Patient discharged in stable condition accompanied by: self.  Departure Mode: Ambulatory.  Face to Face time: 0.    Brisa Hess RN

## 2020-05-04 ENCOUNTER — VIRTUAL VISIT (OUTPATIENT)
Dept: ONCOLOGY | Facility: CLINIC | Age: 55
End: 2020-05-04
Attending: PHYSICIAN ASSISTANT
Payer: MEDICARE

## 2020-05-04 VITALS
BODY MASS INDEX: 27.41 KG/M2 | DIASTOLIC BLOOD PRESSURE: 72 MMHG | RESPIRATION RATE: 18 BRPM | HEART RATE: 89 BPM | OXYGEN SATURATION: 100 % | WEIGHT: 180.3 LBS | SYSTOLIC BLOOD PRESSURE: 110 MMHG | TEMPERATURE: 98.2 F

## 2020-05-04 DIAGNOSIS — I95.9 HYPOTENSION, UNSPECIFIED HYPOTENSION TYPE: ICD-10-CM

## 2020-05-04 DIAGNOSIS — C16.9 GASTRIC ADENOCARCINOMA (H): ICD-10-CM

## 2020-05-04 DIAGNOSIS — C16.9 GASTRIC ADENOCARCINOMA (H): Primary | ICD-10-CM

## 2020-05-04 DIAGNOSIS — C16.9 SIGNET-RING CELL CARCINOMA OF STOMACH (H): ICD-10-CM

## 2020-05-04 DIAGNOSIS — C16.2 MALIGNANT NEOPLASM OF BODY OF STOMACH (H): ICD-10-CM

## 2020-05-04 DIAGNOSIS — R10.11 RUQ ABDOMINAL PAIN: ICD-10-CM

## 2020-05-04 LAB
ALBUMIN SERPL-MCNC: 3 G/DL (ref 3.4–5)
ALP SERPL-CCNC: 119 U/L (ref 40–150)
ALT SERPL W P-5'-P-CCNC: 47 U/L (ref 0–50)
ANION GAP SERPL CALCULATED.3IONS-SCNC: 6 MMOL/L (ref 3–14)
AST SERPL W P-5'-P-CCNC: 38 U/L (ref 0–45)
BASOPHILS # BLD AUTO: 0.1 10E9/L (ref 0–0.2)
BASOPHILS NFR BLD AUTO: 1.6 %
BILIRUB SERPL-MCNC: 0.2 MG/DL (ref 0.2–1.3)
BUN SERPL-MCNC: 13 MG/DL (ref 7–30)
CALCIUM SERPL-MCNC: 8.8 MG/DL (ref 8.5–10.1)
CHLORIDE SERPL-SCNC: 106 MMOL/L (ref 94–109)
CO2 SERPL-SCNC: 27 MMOL/L (ref 20–32)
CREAT SERPL-MCNC: 0.83 MG/DL (ref 0.52–1.04)
DIFFERENTIAL METHOD BLD: ABNORMAL
EOSINOPHIL # BLD AUTO: 0 10E9/L (ref 0–0.7)
EOSINOPHIL NFR BLD AUTO: 0.4 %
ERYTHROCYTE [DISTWIDTH] IN BLOOD BY AUTOMATED COUNT: 15.8 % (ref 10–15)
GFR SERPL CREATININE-BSD FRML MDRD: 80 ML/MIN/{1.73_M2}
GLUCOSE SERPL-MCNC: 91 MG/DL (ref 70–99)
HCT VFR BLD AUTO: 31.3 % (ref 35–47)
HGB BLD-MCNC: 10 G/DL (ref 11.7–15.7)
IMM GRANULOCYTES # BLD: 0 10E9/L (ref 0–0.4)
IMM GRANULOCYTES NFR BLD: 0.2 %
LYMPHOCYTES # BLD AUTO: 2.5 10E9/L (ref 0.8–5.3)
LYMPHOCYTES NFR BLD AUTO: 49.9 %
MCH RBC QN AUTO: 26.9 PG (ref 26.5–33)
MCHC RBC AUTO-ENTMCNC: 31.9 G/DL (ref 31.5–36.5)
MCV RBC AUTO: 84 FL (ref 78–100)
MONOCYTES # BLD AUTO: 0.7 10E9/L (ref 0–1.3)
MONOCYTES NFR BLD AUTO: 14.3 %
NEUTROPHILS # BLD AUTO: 1.7 10E9/L (ref 1.6–8.3)
NEUTROPHILS NFR BLD AUTO: 33.6 %
NRBC # BLD AUTO: 0 10*3/UL
NRBC BLD AUTO-RTO: 0 /100
PLATELET # BLD AUTO: 221 10E9/L (ref 150–450)
POTASSIUM SERPL-SCNC: 3.3 MMOL/L (ref 3.4–5.3)
PROT SERPL-MCNC: 6.5 G/DL (ref 6.8–8.8)
RBC # BLD AUTO: 3.72 10E12/L (ref 3.8–5.2)
SODIUM SERPL-SCNC: 140 MMOL/L (ref 133–144)
WBC # BLD AUTO: 5.1 10E9/L (ref 4–11)

## 2020-05-04 PROCEDURE — 25800030 ZZH RX IP 258 OP 636: Mod: ZF | Performed by: PHYSICIAN ASSISTANT

## 2020-05-04 PROCEDURE — 80053 COMPREHEN METABOLIC PANEL: CPT | Performed by: PHYSICIAN ASSISTANT

## 2020-05-04 PROCEDURE — 96367 TX/PROPH/DG ADDL SEQ IV INF: CPT

## 2020-05-04 PROCEDURE — 96368 THER/DIAG CONCURRENT INF: CPT

## 2020-05-04 PROCEDURE — G0463 HOSPITAL OUTPT CLINIC VISIT: HCPCS | Mod: 25

## 2020-05-04 PROCEDURE — 96417 CHEMO IV INFUS EACH ADDL SEQ: CPT

## 2020-05-04 PROCEDURE — 96375 TX/PRO/DX INJ NEW DRUG ADDON: CPT

## 2020-05-04 PROCEDURE — 25000128 H RX IP 250 OP 636: Mod: ZF | Performed by: PHYSICIAN ASSISTANT

## 2020-05-04 PROCEDURE — 96413 CHEMO IV INFUSION 1 HR: CPT

## 2020-05-04 PROCEDURE — 25000125 ZZHC RX 250: Mod: ZF | Performed by: PHYSICIAN ASSISTANT

## 2020-05-04 PROCEDURE — G0498 CHEMO EXTEND IV INFUS W/PUMP: HCPCS

## 2020-05-04 PROCEDURE — 96415 CHEMO IV INFUSION ADDL HR: CPT

## 2020-05-04 PROCEDURE — 99442: CPT | Performed by: PHYSICIAN ASSISTANT

## 2020-05-04 PROCEDURE — 85025 COMPLETE CBC W/AUTO DIFF WBC: CPT | Performed by: PHYSICIAN ASSISTANT

## 2020-05-04 RX ORDER — TRAMADOL HYDROCHLORIDE 50 MG/1
50 TABLET ORAL EVERY 6 HOURS PRN
Qty: 60 TABLET | Refills: 0 | Status: SHIPPED | OUTPATIENT
Start: 2020-05-04 | End: 2020-05-18

## 2020-05-04 RX ORDER — NALOXONE HYDROCHLORIDE 0.4 MG/ML
.1-.4 INJECTION, SOLUTION INTRAMUSCULAR; INTRAVENOUS; SUBCUTANEOUS
Status: CANCELLED | OUTPATIENT
Start: 2020-05-04

## 2020-05-04 RX ORDER — ALBUTEROL SULFATE 90 UG/1
1-2 AEROSOL, METERED RESPIRATORY (INHALATION)
Status: CANCELLED
Start: 2020-05-04

## 2020-05-04 RX ORDER — DIPHENHYDRAMINE HYDROCHLORIDE 50 MG/ML
50 INJECTION INTRAMUSCULAR; INTRAVENOUS
Status: CANCELLED
Start: 2020-05-04

## 2020-05-04 RX ORDER — METHYLPREDNISOLONE SODIUM SUCCINATE 125 MG/2ML
125 INJECTION, POWDER, LYOPHILIZED, FOR SOLUTION INTRAMUSCULAR; INTRAVENOUS
Status: CANCELLED
Start: 2020-05-04

## 2020-05-04 RX ORDER — EPINEPHRINE 1 MG/ML
0.3 INJECTION, SOLUTION INTRAMUSCULAR; SUBCUTANEOUS EVERY 5 MIN PRN
Status: CANCELLED | OUTPATIENT
Start: 2020-05-04

## 2020-05-04 RX ORDER — MEPERIDINE HYDROCHLORIDE 25 MG/ML
25 INJECTION INTRAMUSCULAR; INTRAVENOUS; SUBCUTANEOUS EVERY 30 MIN PRN
Status: CANCELLED | OUTPATIENT
Start: 2020-05-04

## 2020-05-04 RX ORDER — EPINEPHRINE 0.3 MG/.3ML
0.3 INJECTION SUBCUTANEOUS EVERY 5 MIN PRN
Status: CANCELLED | OUTPATIENT
Start: 2020-05-04

## 2020-05-04 RX ORDER — HEPARIN SODIUM (PORCINE) LOCK FLUSH IV SOLN 100 UNIT/ML 100 UNIT/ML
5 SOLUTION INTRAVENOUS
Status: CANCELLED | OUTPATIENT
Start: 2020-05-05

## 2020-05-04 RX ORDER — SODIUM CHLORIDE 9 MG/ML
1000 INJECTION, SOLUTION INTRAVENOUS CONTINUOUS PRN
Status: CANCELLED
Start: 2020-05-04

## 2020-05-04 RX ORDER — HEPARIN SODIUM (PORCINE) LOCK FLUSH IV SOLN 100 UNIT/ML 100 UNIT/ML
5 SOLUTION INTRAVENOUS ONCE
Status: COMPLETED | OUTPATIENT
Start: 2020-05-04 | End: 2020-05-04

## 2020-05-04 RX ORDER — LORAZEPAM 2 MG/ML
0.5 INJECTION INTRAMUSCULAR EVERY 4 HOURS PRN
Status: CANCELLED
Start: 2020-05-04

## 2020-05-04 RX ORDER — ALBUTEROL SULFATE 0.83 MG/ML
2.5 SOLUTION RESPIRATORY (INHALATION)
Status: CANCELLED | OUTPATIENT
Start: 2020-05-04

## 2020-05-04 RX ADMIN — SODIUM CHLORIDE 500 ML: 9 INJECTION, SOLUTION INTRAVENOUS at 09:41

## 2020-05-04 RX ADMIN — DEXAMETHASONE SODIUM PHOSPHATE: 10 INJECTION, SOLUTION INTRAMUSCULAR; INTRAVENOUS at 09:49

## 2020-05-04 RX ADMIN — FAMOTIDINE 20 MG: 10 INJECTION INTRAVENOUS at 09:46

## 2020-05-04 RX ADMIN — SODIUM CHLORIDE 100 MG: 9 INJECTION, SOLUTION INTRAVENOUS at 10:11

## 2020-05-04 RX ADMIN — OXALIPLATIN 169 MG: 100 INJECTION, SOLUTION, CONCENTRATE INTRAVENOUS at 12:03

## 2020-05-04 RX ADMIN — LEUCOVORIN CALCIUM 400 MG: 200 INJECTION, POWDER, LYOPHILIZED, FOR SOLUTION INTRAMUSCULAR; INTRAVENOUS at 12:03

## 2020-05-04 RX ADMIN — Medication 5 ML: at 06:46

## 2020-05-04 RX ADMIN — DEXTROSE MONOHYDRATE 250 ML: 50 INJECTION, SOLUTION INTRAVENOUS at 09:43

## 2020-05-04 ASSESSMENT — PAIN SCALES - GENERAL: PAINLEVEL: MILD PAIN (3)

## 2020-05-04 NOTE — NURSING NOTE
Chief Complaint   Patient presents with     Port Draw     labs drawn via port by RN in lab     BP 93/59 (BP Location: Left arm, Patient Position: Chair, Cuff Size: Adult Regular)   Pulse 93   Temp 98.2  F (36.8  C) (Oral)   Resp 18   Wt 81.8 kg (180 lb 4.8 oz)   LMP 09/23/2014   SpO2 98%   BMI 27.41 kg/m      Port accessed by RN in lab. Labs collected and sent. Pt tolerated well. Line flushed with Normal Saline & Heparin.     Perlita Rutledge RN

## 2020-05-04 NOTE — PATIENT INSTRUCTIONS
[facility] Triage and after hours / weekends / holidays:  [phone]    Please call the triage or after hours line if you experience a temperature greater than or equal to 100.5, shaking chills, have uncontrolled nausea, vomiting and/or diarrhea, dizziness, shortness of breath, chest pain, bleeding, unexplained bruising, or if you have any other new/concerning symptoms, questions or concerns.      If you are having any concerning symptoms or wish to speak to a provider before your next infusion visit, please call your care coordinator or triage to notify them so we can adequately serve you.     If you need a refill on a narcotic prescription or other medication, please call before your infusion appointment.       Recent Results (from the past 24 hour(s))   CBC with platelets differential    Collection Time: 05/04/20  6:54 AM   Result Value Ref Range    WBC 5.1 4.0 - 11.0 10e9/L    RBC Count 3.72 (L) 3.8 - 5.2 10e12/L    Hemoglobin 10.0 (L) 11.7 - 15.7 g/dL    Hematocrit 31.3 (L) 35.0 - 47.0 %    MCV 84 78 - 100 fl    MCH 26.9 26.5 - 33.0 pg    MCHC 31.9 31.5 - 36.5 g/dL    RDW 15.8 (H) 10.0 - 15.0 %    Platelet Count 221 150 - 450 10e9/L    Diff Method Automated Method     % Neutrophils 33.6 %    % Lymphocytes 49.9 %    % Monocytes 14.3 %    % Eosinophils 0.4 %    % Basophils 1.6 %    % Immature Granulocytes 0.2 %    Nucleated RBCs 0 0 /100    Absolute Neutrophil 1.7 1.6 - 8.3 10e9/L    Absolute Lymphocytes 2.5 0.8 - 5.3 10e9/L    Absolute Monocytes 0.7 0.0 - 1.3 10e9/L    Absolute Eosinophils 0.0 0.0 - 0.7 10e9/L    Absolute Basophils 0.1 0.0 - 0.2 10e9/L    Abs Immature Granulocytes 0.0 0 - 0.4 10e9/L    Absolute Nucleated RBC 0.0    Comprehensive metabolic panel    Collection Time: 05/04/20  6:54 AM   Result Value Ref Range    Sodium 140 133 - 144 mmol/L    Potassium 3.3 (L) 3.4 - 5.3 mmol/L    Chloride 106 94 - 109 mmol/L    Carbon Dioxide 27 20 - 32 mmol/L    Anion Gap 6 3 - 14 mmol/L    Glucose 91 70 - 99  mg/dL    Urea Nitrogen 13 7 - 30 mg/dL    Creatinine 0.83 0.52 - 1.04 mg/dL    GFR Estimate 80 >60 mL/min/[1.73_m2]    GFR Estimate If Black >90 >60 mL/min/[1.73_m2]    Calcium 8.8 8.5 - 10.1 mg/dL    Bilirubin Total 0.2 0.2 - 1.3 mg/dL    Albumin 3.0 (L) 3.4 - 5.0 g/dL    Protein Total 6.5 (L) 6.8 - 8.8 g/dL    Alkaline Phosphatase 119 40 - 150 U/L    ALT 47 0 - 50 U/L    AST 38 0 - 45 U/L       Patient Education     Discharge Instructions: Eating a High-Potassium Diet  Your healthcare provider has told you to eat a high-potassium diet. This may be because you have low levels of potassium in your blood. Or it may be because you have high blood pressure. Potassium is found in many foods. These include dairy products, nuts, seeds, and beans. It s also found in many fruits and vegetables in high amounts.  Guidelines for a high-potassium diet    Eat fruits and vegetables in their fresh or raw form most often.    Check labels for ingredients that have potassium. This includes potassium chloride. Add these items to your diet.    Try salt substitutes. Many of these have potassium.    Avoid licorice. This includes licorice root and teas that have licorice. These can reduce potassium levels in your body.  Eat plenty of the following high-potassium foods:    Fruits. Good choices are apricots (canned and fresh), bananas, cantaloupe, honeydew melon, kiwi, nectarines, oranges, orange juice, and pears. Dried fruits include apricots, dates, figs, and prunes. Prune juice also has potassium.    Vegetables. Good choices are asparagus, avocado, artichoke, broccoli, bamboo shoots, beets, [place] sprouts, cabbage, celery, chard, okra, potatoes (white and sweet), pumpkin, rutabaga, spinach (cooked), squash, and tomatoes. Tomato sauce, tomato juice, and vegetable juice cocktail are also good choices.    Chicken, fish, clams, and crab    Milk, chocolate milk, buttermilk, and soy milk    Legumes. These include black-eyed peas, chickpeas,  lentils, lima beans, navy beans, red kidney beans, soybeans, and split peas.    Nuts and seeds. Try almonds, [place] nuts, cashews, peanuts, peanut butter, pecans, pumpkin seeds, sunflower seeds, and walnuts.    Breads and cereals. These include bran and whole-grain products.    Others foods include chocolate, cocoa, coconut milk, and molasses  Follow-up  Make a follow-up appointment for a repeat test.  When to call your healthcare provider  Call your healthcare provider right away or go to the ER if you have any of the following:    Vomiting    Extreme tiredness (fatigue)    Diarrhea    Rapid, irregular heartbeat    Shortness of breath    Chest pain    Muscle cramps, spasms, or twitching    Weakness    Paralysis   Date Last Reviewed: 11/1/2017 2000-2019 [employer]. [address] [place] [address]. All rights reserved. This information is not intended as a substitute for professional medical care. Always follow your healthcare professional's instructions.

## 2020-05-04 NOTE — PROGRESS NOTES
Infusion Nursing Note:  Nathalie Bashir presents today for taxotere, oxaliplatin, leucovorin, and flourorouracil day 1 cycle 2 .    Patient seen by provider today: Yes: telephone visit with Julienne Eden today    present during visit today: Not Applicable.    Note: patient hypotensive upon arrival 80/48 denies dizziness or SOB. Julienne Eden notified. Denied nausea, vomiting, and diarrhea. Reports drinking and eating well.     TORB: Julienne Eden PA-C/Melba Kong RN: 500 ml NS bolus over 30 minutes. Also ok to proceed with infusion today.     Potassium 3.3: per Julienne Eden patient educated on potassium rich foods. Hand out given.     Intravenous Access:  Implanted Port.    Treatment Conditions:  Lab Results   Component Value Date    HGB 10.0 05/04/2020     Lab Results   Component Value Date    WBC 5.1 05/04/2020      Lab Results   Component Value Date    ANEU 1.7 05/04/2020     Lab Results   Component Value Date     05/04/2020      Lab Results   Component Value Date     05/04/2020                   Lab Results   Component Value Date    POTASSIUM 3.3 05/04/2020           Lab Results   Component Value Date    MAG 2.0 03/31/2020            Lab Results   Component Value Date    CR 0.83 05/04/2020                   Lab Results   Component Value Date    PETRA 8.8 05/04/2020                Lab Results   Component Value Date    BILITOTAL 0.2 05/04/2020           Lab Results   Component Value Date    ALBUMIN 3.0 05/04/2020                    Lab Results   Component Value Date    ALT 47 05/04/2020           Lab Results   Component Value Date    AST 38 05/04/2020       Results reviewed, labs MET treatment parameters, ok to proceed with treatment.    Patient confirmed she is taking her decadron.    Administrations This Visit     0.9% sodium chloride BOLUS     Admin Date  05/04/2020 Action  New Bag Dose  500 mL Rate  1,000 mL/hr Route  Intravenous Administered By  Anastasia Kong, REBECCA          dextrose  5% BOLUS     Admin Date  05/04/2020 Action  New Bag Dose  250 mL Route  Intravenous Administered By  Anastasia Kong RN          famotidine (PEPCID) injection 20 mg     Admin Date  05/04/2020 Action  Given Dose  20 mg Route  Intravenous Administered By  Anastasia Kong RN          fluorouracil (ADRUCIL) 5175mg in sodium chloride 0.9 % 240 mL Home Infusion     Admin Date  05/04/2020 Action  Given Dose  5175 mg Rate  10 mL/hr Route  Intravenous Administered By  Cathy Villegas RN          heparin 100 UNIT/ML injection 5 mL     Admin Date  05/04/2020 Action  Given Dose  5 mL Route  Intracatheter Administered By  Perlita Rutledge RN          leucovorin calcium 400 mg in D5W 100 mL infusion     Admin Date  05/04/2020 Action  New Bag Dose  400 mg Rate  65 mL/hr Route  Intravenous Administered By  Cathy Villegas RN          ondansetron (ZOFRAN) 8 mg, dexamethasone (DECADRON) 12 mg in sodium chloride 0.9 % 55.2 mL intermittent infusion     Admin Date  05/04/2020 Action  New Bag Dose   Rate  220.8 mL/hr Route  Intravenous Administered By  Anastasia Kong RN          oxaliplatin (ELOXATIN) 169 mg in D5W 584 mL infusion     Admin Date  05/04/2020 Action  New Bag Dose  169 mg Rate  292 mL/hr Route  Intravenous Administered By  Cathy Villegas RN          sodium chloride (PF) 0.9% PF flush 10 mL     Admin Date  05/04/2020 Action  Given Dose  10 mL Route  Intracatheter Administered By  Perlita Rutledge RN                 Post Infusion Assessment:  Patient tolerated infusion without incident.  Blood return noted pre and post infusion.  Site patent and intact, free from redness, edema or discomfort.  No evidence of extravasations.       Discharge Plan:   Patient requesting prescription refills: ativan, zofran, and tramodol.  Discharge instructions reviewed with: Patient.  AVS to patient via [employer].  Patient will return 5/19 for next appointment and 5/5 at 1400 for pump disconnect  Patient discharged in  stable condition accompanied by: self to home.     Anastasia Kong RN

## 2020-05-04 NOTE — PROGRESS NOTES
"Nathalie Bashir is a 54 year old female who is being evaluated via a billable video visit.      The patient has been notified of following:     \"This video visit will be conducted via a call between you and your physician/provider. We have found that certain health care needs can be provided without the need for an in-person physical exam.  This service lets us provide the care you need with a video conversation.  If a prescription is necessary we can send it directly to your pharmacy.  If lab work is needed we can place an order for that and you can then stop by our lab to have the test done at a later time.    Video visits are billed at different rates depending on your insurance coverage.  Please reach out to your insurance provider with any questions.    If during the course of the call the physician/provider feels a video visit is not appropriate, you will not be charged for this service.\"    Patient has given verbal consent for Video visit? {YES-NO  Default Yes:4444::\"Yes\"}    How would you like to obtain your AVS? {AVS Preference:[id number]}    Patient would like the video invitation sent by: {video visit invitation:[id number]}    Will anyone else be joining your video visit? {:[id number]}  {If patient encounters technical issues they should call [phone] :[id number]}    Secondary Video Option (Doximity), send text message to:  ***    I have reviewed and updated the patient's allergies and medication list.    Concerns:    Refills:      James Perera, HERLINDA      This visit was converted to a video visit due to technical difficulties with computer and phone.     Nathalie Bashir is a 54 year old female who is being evaluated via a billable video visit.      The patient has been notified of following:     \"This video visit will be conducted via a call between you and your physician/provider. We have found that certain health care needs can be provided without the need for an in-person physical exam.  This service lets us provide " "the care you need with a video conversation.  If a prescription is necessary we can send it directly to your pharmacy.  If lab work is needed we can place an order for that and you can then stop by our lab to have the test done at a later time.    Video visits are billed at different rates depending on your insurance coverage.  Please reach out to your insurance provider with any questions.    If during the course of the call the physician/provider feels a video visit is not appropriate, you will not be charged for this service.\"    Patient has given verbal consent for Video visit? Yes    How would you like to obtain your AVS? MyChart    Patient would like the video invitation sent by: Text to cell phone: [phone]  {If patient encounters technical issues they should call [phone] :[id number]}     Refills: Carafate.    Additional concerns: States upset stomach is still ongoing.     Video Start Time:NA-had to do telephone visit due to technologic issues with getting cell phone to work in clinic    Additional provider notes:     Oncologic History  Ms. Bashir is a 54 year old female with a history of gastric adenocarcinoma.  She was being followed for a pancreatic duct dilatation and pancreatic cyst which was noted in November 2018.  Since May 2019 she started noticing some nausea and vomiting and occasional abdominal discomfort.  She had an ultrasound in August 2019 which was essentially unremarkable.  She had a repeat endoscopic ultrasound on 10/29/2019 which showed increase in size of the cyst as well as dilation of the main pancreatic duct.  FNA and fluid analysis showed mucinous epithelium.  She was referred to Dr. 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  Surprisingly there was no pancreatic ductal neoplasm seen but on the resected specimen stomach cancer was noted.  It was poorly differentiated adenocarcinoma with poorly cohesive/signet ring cell type with proximal " gastric mucosal and serosal margins being positive.  There was lymphovascular and perineural invasion seen.  22 lymph nodes were sampled and all were benign.  It was a pT4aN0 lesion.  HER-2/christine FISH was not amplified.         EUS on 3/3/2020 without clear evidence of neoplasm endoscopically. Left gastric lymph node fine-needle aspiration was negative for carcinoma. Biopsy from the gastric jejunal anastomosis as well as lesser curvature of the stomach also did not show any malignancy.     She had a PET/CT on 3/13/2020 which showed soft tissue streaky density with increased FDG uptake adjacent to the pancreaticojejunostomy which could be neoplastic in origin.  There is mild increased FDG uptake in the gastric remnant.  Focal area of soft tissue thickening with fatty streakiness along medial laparotomy with increased FDG uptake which likely is inflammatory. Increased FDG uptake in thoracic esophagus which could be esophagitis.     She started on chemotherapy with 5FU, oxaliplatin, and Taxotere on 3/27/20. She was seen on 4/7/20 for evaluation of fevers. She was treated for possible pneumonia with Levaquin.     She was seen in clinic 4/13/20 for RUQ pain,CT and labs reassuring.     She was called today for oncology follow-up prior to cycle 2 5FU, Oxaliplatin, and Taxotere.     Interval History:  Ms. Bashir was called today for oncology follow-up.     Still having ongoing LUQ pain. Occasional RUQ pain. Tramadol helping. Usually taking 1-2x/day. Did not feel like the carafate was helpful though the Pepcid has been very  helpful. Eating and drinking well. Appetite is good. Still taking Reglan with lunch. Has more fatigue since starting all of this, sometimes sleeps 13 hours at night. Occasional diarrhea which resolves with Imodium. Denies mouth sores.     Cold sensitivity neuropathy. Worse right away and then improves with time. Still has some even yesterday. She has baseline numbness/tingling in feet, though it is not any  worse (started over 10 years ago). Transient numbness in hands.     Denies any SOB, CP or cough. No f/c. No urinary symptoms.      Has been dizzy after the chemo. Some dizziness with getting up from sitting to standing. Denies any dizziness today.     Took dex pre meds.     Objective:  Vital Signs 5/4/2020   Systolic 93   Diastolic 59   Pulse 93   Temperature 98.2   Respirations 18   Weight (LB) 180 lb 4.8 oz   O2 98       Well sounding female on phone. Good voice quality. Normal speech and thought process.    Labs reviewed:     5/4/2020 06:54   Sodium 140   Potassium 3.3 (L)   Chloride 106   Carbon Dioxide 27   Urea Nitrogen 13   Creatinine 0.83   GFR Estimate 80   GFR Estimate If Black >90   Calcium 8.8   Anion Gap 6   Albumin 3.0 (L)   Protein Total 6.5 (L)   Bilirubin Total 0.2   Alkaline Phosphatase 119   ALT 47   AST 38   Glucose 91   WBC 5.1   Hemoglobin 10.0 (L)   Hematocrit 31.3 (L)   Platelet Count 221   RBC Count 3.72 (L)   MCV 84   MCH 26.9   MCHC 31.9   RDW 15.8 (H)   Diff Method Automated Method   % Neutrophils 33.6   % Lymphocytes 49.9   % Monocytes 14.3   % Eosinophils 0.4   % Basophils 1.6   % Immature Granulocytes 0.2   Nucleated RBCs 0   Absolute Neutrophil 1.7   Absolute Lymphocytes 2.5   Absolute Monocytes 0.7   Absolute Eosinophils 0.0   Absolute Basophils 0.1   Abs Immature Granulocytes 0.0   Absolute Nucleated RBC 0.0       Assessment and Plan:  1. 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 This was found on Whipple surgery specimen as it was done for presumed pancreatic IPMN although no pancreatic neoplasm was found.  It was a pT4aN0 lesion with lymphovascular invasion and perineural invasion seen. All 22 lymph nodes were negative.    She was started on neoadjuvant chemotherapy with FLOT (5FU, Oxaliplatin, Docetaxel) 3/27/20. Cycle 2 delayed for pneumonia, now recovered. Resumed on 4/20/20.    slight LFT elevation we will  monitor    Due to gastritis issues and minimal issues with cycle 1 will reduce Dex to 4mg day before, day of, and 2 days after chemotherapy. Keep IV dex at 20mg today since she didn't take any PO dex and if does well can reduce IV dex next cycle.     Plan for 2 months of chemotherapy before repeating scans.    2. GI  Gastritis: Ongoing, worse with high dose steroids, adjusting as above. Continue Pantoprazole 40mg BID and started Famotidine 20mg BID and this has been very helpful. Continue Simethicone PRN. Also added Pepcid to pre-meds.    RUQ/epigastric pain: Has had CT imaging with no acute findings. Suspect post surgical plus gastritis. OK to continue Tramadol PRN for now with goal that adjustment in gastritis meds will improve pain.    Diarrhea: Isolated after 1st cycle, can use Imodium PRN.    Early Satiety: Continue Reglan PRN    3. ID  Prior pneumonia symptoms resolved. Monitor for any recurrent infectious symptoms.    Changed to phone visit due to technologic issues. Phone call 18 min    Julienne Eden PA-C  [facility]  [address], [place] [id number] [phone]

## 2020-05-04 NOTE — PATIENT INSTRUCTIONS
Patient Education     Potassium-Rich Foods  The normal adult diet usually contains 2,000 mg to 4,000 mg of potassium per day. More potassium is needed when you lose too much potassium from your body. This can happen if you have diarrhea or vomiting. It can also happen if you take a medicine to make you urinate more (diuretic). To increase the amount of potassium in your diet, include these high-potassium foods.     [The (*) indicates foods highest in potassium.]  Vegetables  Artichokes. Cooked 1/2 cup, 200 mg to 300 mg*  Asparagus. Cooked 1/2 cup, 200 mg to 300 mg  Beans. White, red, perry cooked 1/2 cup, 300 mg to 500 mg*  Beets. Cooked 1/2 cup, 200 mg to 300 mg  Broccoli. Cooked or raw 1 cup, 200 mg to 500 mg*  [place] sprouts. Cooked 1/2 cup, 200 mg to 300 mg  Cabbage. Raw 1 cup, 100 mg to 200 mg  Carrots. Raw or cooked 1/2 cup, 100 mg to 200 mg  Celery. Raw 1 cup, 200 mg to 300 mg  Lima beans. Fresh or frozen 1/2 cup, 300 mg to 500 mg*   Mushrooms. Raw or cooked 1/2 cup, 100 mg to 300 mg  Peas. Cooked 1/2 cup, 150 mg to 250 mg   Potatoes. Baked 1 medium, 500 mg to 900 mg*   Spinach. Cooked 1 cup, 800 mg to 900 mg*   Spinach. Raw 2 cups, 300 mg to 400 mg *  Squash, winter. Fresh, frozen, or cooked 1/2 cup, 200 mg to 400 mg   Tomato. Fresh 1 medium, 200 mg to 300 mg   Tomato juice. Canned 1/2 cup, 200 mg to 300 mg   Fruits  Apple juice. Unsweetened 1 cup, 200 mg to 300 mg   Apricots. Canned 1/2 cup, 200 mg to 300 mg   Apricots. Dried 4 pieces, 100 mg to 200 mg   Avocado. Raw 1/2 cup, 300 mg to 400 mg*  Banana. Fresh 1 small, 300 mg to 400 mg*   Cantaloupe. Fresh 1 cup diced, 300 mg to 400 mg*   Grape juice. Unsweetened 1 cup, 200 mg to 300 mg   Honeydew melon. Fresh 1 cup diced, 300 mg to 400 mg*   Orange. Fresh 1 medium, 200 mg to 300 mg    Orange juice. Unsweetened, fresh or frozen 1/2 cup, 200 mg to 300 mg  Pineapple juice. Unsweetened 1 cup, 300 mg to 400 mg   Prune juice. Unsweetened 1/2 cup, 300 mg to 400  mg*   Prunes. Dried 5 pieces, 300 mg to 400 mg*   Strawberries. Fresh or frozen 1 cup, 200 mg to 300 mg  Meat  Red meat. Cooked 3 ounces, 100 mg to 300 mg   Seafood  Cod, flounder, halibut. Cooked 3 ounces, 100 mg to 300 mg*  [place]. Cooked, 3 ounces 300 mg to 400 mg*   Scallops. Cooked 3 ounces, 200 mg to 300 mg*  Shrimp. Cooked 3/4 cup, 100 mg to 200 mg   Tuna. Fresh or canned 3/4 cup, 200 mg to 500 mg   Date Last Reviewed: 10/1/2016    5520-8786 [employer]. [address] [place] [address], [place] [address]. All rights reserved. This information is not intended as a substitute for professional medical care. Always follow your healthcare professional's instructions.

## 2020-05-04 NOTE — PROGRESS NOTES
"This visit was converted to a video visit due to technical difficulties with computer and phone.     Nathalie Bashir is a 54 year old female who is being evaluated via a billable video visit.      The patient has been notified of following:     \"This video visit will be conducted via a call between you and your physician/provider. We have found that certain health care needs can be provided without the need for an in-person physical exam.  This service lets us provide the care you need with a video conversation.  If a prescription is necessary we can send it directly to your pharmacy.  If lab work is needed we can place an order for that and you can then stop by our lab to have the test done at a later time.    Video visits are billed at different rates depending on your insurance coverage.  Please reach out to your insurance provider with any questions.    If during the course of the call the physician/provider feels a video visit is not appropriate, you will not be charged for this service.\"    Patient has given verbal consent for Video visit? Yes    How would you like to obtain your AVS? Kelsey    Patient would like the video invitation sent by: Text to cell phone: [phone]       Refills: Carafate.    Additional concerns: States upset stomach is still ongoing.     Video Start Time: NA-had to do telephone visit due to technologic issues with getting cell phone to work in clinic    Additional provider notes:     Oncologic History  Ms. Bashir is a 54 year old female with a history of gastric adenocarcinoma.  She was being followed for a pancreatic duct dilatation and pancreatic cyst which was noted in November 2018.  Since May 2019 she started noticing some nausea and vomiting and occasional abdominal discomfort.  She had an ultrasound in August 2019 which was essentially unremarkable.  She had a repeat endoscopic ultrasound on 10/29/2019 which showed increase in size of the cyst as well as dilation of the main " pancreatic duct.  FNA and fluid analysis showed mucinous epithelium.  She was referred to Dr. 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  Surprisingly there was no pancreatic ductal neoplasm seen but on the resected specimen stomach cancer was noted.  It was poorly differentiated adenocarcinoma with poorly cohesive/signet ring cell type with proximal gastric mucosal and serosal margins being positive.  There was lymphovascular and perineural invasion seen.  22 lymph nodes were sampled and all were benign.  It was a pT4aN0 lesion.  HER-2/christine FISH was not amplified.         EUS on 3/3/2020 without clear evidence of neoplasm endoscopically. Left gastric lymph node fine-needle aspiration was negative for carcinoma. Biopsy from the gastric jejunal anastomosis as well as lesser curvature of the stomach also did not show any malignancy.     She had a PET/CT on 3/13/2020 which showed soft tissue streaky density with increased FDG uptake adjacent to the pancreaticojejunostomy which could be neoplastic in origin.  There is mild increased FDG uptake in the gastric remnant.  Focal area of soft tissue thickening with fatty streakiness along medial laparotomy with increased FDG uptake which likely is inflammatory. Increased FDG uptake in thoracic esophagus which could be esophagitis.     She started on chemotherapy with 5FU, oxaliplatin, and Taxotere on 3/27/20. She was seen on 4/7/20 for evaluation of fevers. She was treated for possible pneumonia with Levaquin.     She was seen in clinic 4/13/20 for RUQ pain,CT and labs reassuring.     She was called today for oncology follow-up prior to cycle 3 5FU, Oxaliplatin, and Taxotere.     Interval History:  Still having ongoing LUQ pain. Occasional RUQ pain. Tramadol helping. Usually taking 1-2x/day. Did not feel like the carafate was helpful though the Pepcid has been very helpful. Eating and drinking well. Appetite is good. Still taking Reglan  with lunch. Has more fatigue since starting all of this, sometimes sleeps 13 hours at night. Occasional diarrhea which resolves with Imodium. Denies mouth sores.     Cold sensitivity neuropathy. Worse right away and then improves with time. Still has some even yesterday. She has baseline numbness/tingling in feet, though it is not any worse (started over 10 years ago). Transient numbness in hands.     Denies any SOB, CP or cough. No f/c. No urinary symptoms.      Has been dizzy after the chemo. Some dizziness with getting up from sitting to standing. Denies any dizziness today.     Took dex pre meds.     Objective:  Vital Signs 5/4/2020   Systolic 93   Diastolic 59   Pulse 93   Temperature 98.2   Respirations 18   Weight (LB) 180 lb 4.8 oz   O2 98       Well sounding female on phone. Good voice quality. Normal speech and thought process.    Labs reviewed:     5/4/2020 06:54   Sodium 140   Potassium 3.3 (L)   Chloride 106   Carbon Dioxide 27   Urea Nitrogen 13   Creatinine 0.83   GFR Estimate 80   GFR Estimate If Black >90   Calcium 8.8   Anion Gap 6   Albumin 3.0 (L)   Protein Total 6.5 (L)   Bilirubin Total 0.2   Alkaline Phosphatase 119   ALT 47   AST 38   Glucose 91   WBC 5.1   Hemoglobin 10.0 (L)   Hematocrit 31.3 (L)   Platelet Count 221   RBC Count 3.72 (L)   MCV 84   MCH 26.9   MCHC 31.9   RDW 15.8 (H)   Diff Method Automated Method   % Neutrophils 33.6   % Lymphocytes 49.9   % Monocytes 14.3   % Eosinophils 0.4   % Basophils 1.6   % Immature Granulocytes 0.2   Nucleated RBCs 0   Absolute Neutrophil 1.7   Absolute Lymphocytes 2.5   Absolute Monocytes 0.7   Absolute Eosinophils 0.0   Absolute Basophils 0.1   Abs Immature Granulocytes 0.0   Absolute Nucleated RBC 0.0       Assessment and Plan:  1. 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 This was found on Whipple surgery specimen as it was done for presumed pancreatic IPMN although no pancreatic  neoplasm was found.  It was a pT4aN0 lesion with lymphovascular invasion and perineural invasion seen. All 22 lymph nodes were negative.    She was started on neoadjuvant chemotherapy with FLOT (5FU, Oxaliplatin, Docetaxel) 3/27/20. Cycle 2 delayed for pneumonia, now recovered. Resumed on 4/20/20. Continues to tolerate therapy overall well. Will continue with Cycle 3 today.    Due to gastritis issues and minimal issues with cycle 1 reduced Dex to 4mg day before, day of, and 2 days after chemotherapy. She remembered her dex prep so will lower dex to 12 mg today since she didn't take any PO dex and if does well can reduce IV dex next cycle.     Plan for 2 months of chemotherapy before repeating PET scan (will request for this after Cycle 4). Will follow-up with Roxana CHRISTINE prior to Cycle 4    2. GI  Gastritis: Ongoing, worse with high dose steroids, adjusting as above. Continue Pantoprazole 40mg BID. Famotidine 20mg BID was add last cycle and this has been very helpful. Continue Simethicone PRN, not using very often. Also added Pepcid to pre-meds.    RUQ/epigastric pain: Has had CT imaging with no acute findings. Suspect post surgical plus gastritis. Ok to continue Tramadol PRN, currently using 1-2x/day. Hopefully controlling gastritis will help    Diarrhea: Isolated after 1st cycle, can use Imodium PRN.    Early Satiety: Continue Reglan PRN, usually only at lunch    Hypokalemia: Mild. Will give her list of high potassium foods to start including in her diet     3. ID  Prior pneumonia symptoms resolved. Monitor for any recurrent infectious symptoms.    4. Neuro  Baseline neuropathy in her feet for > 10 years. Unchanged. Has some transient symptoms in her hands. No apparent motor symptoms at this time. No need for dose reduction. Cold sensitivity neuropathy lasting longer each cycle    5. Cards  Hypotension. Not symptomatic. No infectious s/s and afebrile. Does have transient dizziness. Could be related to  dehydration. Will give 1 L of NS and see if BP improves    Changed to phone visit due to technologic issues. Phone call 16 min    Julienne Eden PA-C  [facility]  [address] [place] [address] [phone]

## 2020-05-04 NOTE — LETTER
"5/4/2020       RE: Nathalie Bashir  [address] [address]     Dear Colleague,    Thank you for referring your patient, Nathalie Bashir, to the [facility]. Please see a copy of my visit note below.    This visit was converted to a video visit due to technical difficulties with computer and phone.     Nathalie Bashir is a 54 year old female who is being evaluated via a billable video visit.      The patient has been notified of following:     \"This video visit will be conducted via a call between you and your physician/provider. We have found that certain health care needs can be provided without the need for an in-person physical exam.  This service lets us provide the care you need with a video conversation.  If a prescription is necessary we can send it directly to your pharmacy.  If lab work is needed we can place an order for that and you can then stop by our lab to have the test done at a later time.    Video visits are billed at different rates depending on your insurance coverage.  Please reach out to your insurance provider with any questions.    If during the course of the call the physician/provider feels a video visit is not appropriate, you will not be charged for this service.\"    Patient has given verbal consent for Video visit? Yes    How would you like to obtain your AVS? [facility]    Patient would like the video invitation sent by: Text to cell phone: [phone]       Refills: Carafate.    Additional concerns: States upset stomach is still ongoing.     Video Start Time: NA-had to do telephone visit due to technologic issues with getting cell phone to work in clinic    Additional provider notes:     Oncologic History  Ms. Bashir is a 54 year old female with a history of gastric adenocarcinoma.  She was being followed for a pancreatic duct dilatation and pancreatic cyst which was noted in November 2018.  Since May 2019 she started noticing some nausea and vomiting and " occasional abdominal discomfort.  She had an ultrasound in August 2019 which was essentially unremarkable.  She had a repeat endoscopic ultrasound on 10/29/2019 which showed increase in size of the cyst as well as dilation of the main pancreatic duct.  FNA and fluid analysis showed mucinous epithelium.  She was referred to Dr. 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  Surprisingly there was no pancreatic ductal neoplasm seen but on the resected specimen stomach cancer was noted.  It was poorly differentiated adenocarcinoma with poorly cohesive/signet ring cell type with proximal gastric mucosal and serosal margins being positive.  There was lymphovascular and perineural invasion seen.  22 lymph nodes were sampled and all were benign.  It was a pT4aN0 lesion.  HER-2/christine FISH was not amplified.         EUS on 3/3/2020 without clear evidence of neoplasm endoscopically. Left gastric lymph node fine-needle aspiration was negative for carcinoma. Biopsy from the gastric jejunal anastomosis as well as lesser curvature of the stomach also did not show any malignancy.     She had a PET/CT on 3/13/2020 which showed soft tissue streaky density with increased FDG uptake adjacent to the pancreaticojejunostomy which could be neoplastic in origin.  There is mild increased FDG uptake in the gastric remnant.  Focal area of soft tissue thickening with fatty streakiness along medial laparotomy with increased FDG uptake which likely is inflammatory. Increased FDG uptake in thoracic esophagus which could be esophagitis.     She started on chemotherapy with 5FU, oxaliplatin, and Taxotere on 3/27/20. She was seen on 4/7/20 for evaluation of fevers. She was treated for possible pneumonia with Levaquin.     She was seen in clinic 4/13/20 for RUQ pain,CT and labs reassuring.     She was called today for oncology follow-up prior to cycle 3 5FU, Oxaliplatin, and Taxotere.     Interval History:  Still  having ongoing LUQ pain. Occasional RUQ pain. Tramadol helping. Usually taking 1-2x/day. Did not feel like the carafate was helpful though the Pepcid has been very helpful. Eating and drinking well. Appetite is good. Still taking Reglan with lunch. Has more fatigue since starting all of this, sometimes sleeps 13 hours at night. Occasional diarrhea which resolves with Imodium. Denies mouth sores.     Cold sensitivity neuropathy. Worse right away and then improves with time. Still has some even yesterday. She has baseline numbness/tingling in feet, though it is not any worse (started over 10 years ago). Transient numbness in hands.     Denies any SOB, CP or cough. No f/c. No urinary symptoms.      Has been dizzy after the chemo. Some dizziness with getting up from sitting to standing. Denies any dizziness today.     Took dex pre meds.     Objective:  Vital Signs 5/4/2020   Systolic 93   Diastolic 59   Pulse 93   Temperature 98.2   Respirations 18   Weight (LB) 180 lb 4.8 oz   O2 98       Well sounding female on phone. Good voice quality. Normal speech and thought process.    Labs reviewed:     5/4/2020 06:54   Sodium 140   Potassium 3.3 (L)   Chloride 106   Carbon Dioxide 27   Urea Nitrogen 13   Creatinine 0.83   GFR Estimate 80   GFR Estimate If Black >90   Calcium 8.8   Anion Gap 6   Albumin 3.0 (L)   Protein Total 6.5 (L)   Bilirubin Total 0.2   Alkaline Phosphatase 119   ALT 47   AST 38   Glucose 91   WBC 5.1   Hemoglobin 10.0 (L)   Hematocrit 31.3 (L)   Platelet Count 221   RBC Count 3.72 (L)   MCV 84   MCH 26.9   MCHC 31.9   RDW 15.8 (H)   Diff Method Automated Method   % Neutrophils 33.6   % Lymphocytes 49.9   % Monocytes 14.3   % Eosinophils 0.4   % Basophils 1.6   % Immature Granulocytes 0.2   Nucleated RBCs 0   Absolute Neutrophil 1.7   Absolute Lymphocytes 2.5   Absolute Monocytes 0.7   Absolute Eosinophils 0.0   Absolute Basophils 0.1   Abs Immature Granulocytes 0.0   Absolute Nucleated RBC 0.0        Assessment and Plan:  1. 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 This was found on Whipple surgery specimen as it was done for presumed pancreatic IPMN although no pancreatic neoplasm was found.  It was a pT4aN0 lesion with lymphovascular invasion and perineural invasion seen. All 22 lymph nodes were negative.    She was started on neoadjuvant chemotherapy with FLOT (5FU, Oxaliplatin, Docetaxel) 3/27/20. Cycle 2 delayed for pneumonia, now recovered. Resumed on 4/20/20. Continues to tolerate therapy overall well. Will continue with Cycle 3 today.    Due to gastritis issues and minimal issues with cycle 1 reduced Dex to 4mg day before, day of, and 2 days after chemotherapy. She remembered her dex prep so will lower dex to 12 mg today since she didn't take any PO dex and if does well can reduce IV dex next cycle.     Plan for 2 months of chemotherapy before repeating PET scan (will request for this after Cycle 4). Will follow-up with Roxana CHRISTINE prior to Cycle 4    2. GI  Gastritis: Ongoing, worse with high dose steroids, adjusting as above. Continue Pantoprazole 40mg BID. Famotidine 20mg BID was add last cycle and this has been very helpful. Continue Simethicone PRN, not using very often. Also added Pepcid to pre-meds.    RUQ/epigastric pain: Has had CT imaging with no acute findings. Suspect post surgical plus gastritis. Ok to continue Tramadol PRN, currently using 1-2x/day. Hopefully controlling gastritis will help    Diarrhea: Isolated after 1st cycle, can use Imodium PRN.    Early Satiety: Continue Reglan PRN, usually only at lunch    Hypokalemia: Mild. Will give her list of high potassium foods to start including in her diet     3. ID  Prior pneumonia symptoms resolved. Monitor for any recurrent infectious symptoms.    4. Neuro  Baseline neuropathy in her feet for > 10 years. Unchanged. Has some transient symptoms in her hands. No apparent  motor symptoms at this time. No need for dose reduction. Cold sensitivity neuropathy lasting longer each cycle    5. Cards  Hypotension. Not symptomatic. No infectious s/s and afebrile. Does have transient dizziness. Could be related to dehydration. Will give 1 L of NS and see if BP improves    Changed to phone visit due to technologic issues. Phone call 16 min    Julienne Eden PA-C  [facility] [address] [id number] [phone]          Again, thank you for allowing me to participate in the care of your patient.      Sincerely,    Julienne Eden PA-C

## 2020-05-05 ENCOUNTER — INFUSION THERAPY VISIT (OUTPATIENT)
Dept: ONCOLOGY | Facility: CLINIC | Age: 55
End: 2020-05-05
Attending: INTERNAL MEDICINE
Payer: MEDICARE

## 2020-05-05 VITALS
HEART RATE: 94 BPM | OXYGEN SATURATION: 98 % | TEMPERATURE: 98.9 F | RESPIRATION RATE: 16 BRPM | SYSTOLIC BLOOD PRESSURE: 100 MMHG | DIASTOLIC BLOOD PRESSURE: 59 MMHG

## 2020-05-05 DIAGNOSIS — C16.9 GASTRIC ADENOCARCINOMA (H): Primary | ICD-10-CM

## 2020-05-05 PROCEDURE — 40000114 ZZH STATISTIC NO CHARGE CLINIC VISIT

## 2020-05-05 PROCEDURE — 25000128 H RX IP 250 OP 636: Mod: ZF | Performed by: PHYSICIAN ASSISTANT

## 2020-05-05 RX ORDER — HEPARIN SODIUM (PORCINE) LOCK FLUSH IV SOLN 100 UNIT/ML 100 UNIT/ML
5 SOLUTION INTRAVENOUS
Status: DISCONTINUED | OUTPATIENT
Start: 2020-05-05 | End: 2020-05-05 | Stop reason: HOSPADM

## 2020-05-05 RX ADMIN — Medication 5 ML: at 14:06

## 2020-05-05 NOTE — PROGRESS NOTES
"Infusion Nursing Note:  Nathalie Bashir presents today for Fluorouracil home pump disconnect.   Patient presented to infusion at 1400 with CADD pump alarming empty. Fluorouracil chemo bag appeared completely empty.   Patient seen by provider today: No   present during visit today: Not Applicable.    Note: Patient presents to infusion feeling well. Patient states improved dizziness compared to yesterday as she received a 500NS bolus with chemo for lower bp. Patient states chronic intermittent dizziness that is managed with PRN Meclizine. Patient took Meclizine this morning with resolution of dizziness she had this am. Patient also verified she's maintained adequate oral intake today. Vitals reviewed and patient states 98/60 and 100/69 is consistent with blood pressure trends she's had in the past. Increase in neuropathy in bilateral hands noted compared to post first cycle, specifically stating \"they feel stiff and tight\". No swelling or redness noted and prn Tramadol has given some relief with hand discomfort. Cold sensitivity managed with various home interventions. Otherwise, patient denies s/s of infection such as fever, chest pain, shortness of breath, cough, or change in taste/smell overnight. Patient denies acute complaints or concerns needing to be addressed today. Education given of to notify provider if worsening dizziness or discomfort in hands noted at home with verbalized understanding. Patient declined further pain management interventions for abdominal discomfort as PRN tramadol she took at home is managing symptoms. Lastly, pt confirms she is taking PO Decadron as prescrbied.     Intravenous Access:  Implanted Port.    Treatment Conditions:  Not Applicable.      Post Infusion Assessment:  Blood return noted pre and post infusion.  No evidence of extravasations.  Access discontinued per protocol.       Discharge Plan:   Patient declined prescription refills.  Discharge instructions reviewed " with: Patient.  Patient and/or family verbalized understanding of discharge instructions and all questions answered.  Copy of AVS reviewed with patient and/or family.  Patient will return 5/18 for next appointment.  Patient discharged in stable condition accompanied by: self.  Departure Mode: Ambulatory.  Face to Face time: 0 minutes.    Al Saab RN

## 2020-05-05 NOTE — PATIENT INSTRUCTIONS
Patient Education     Discharge Instructions: Eating a High-Potassium Diet  Your healthcare provider has told you to eat a high-potassium diet. This may be because you have low levels of potassium in your blood. Or it may be because you have high blood pressure. Potassium is found in many foods. These include dairy products, nuts, seeds, and beans. It s also found in many fruits and vegetables in high amounts.  Guidelines for a high-potassium diet    Eat fruits and vegetables in their fresh or raw form most often.    Check labels for ingredients that have potassium. This includes potassium chloride. Add these items to your diet.    Try salt substitutes. Many of these have potassium.    Avoid licorice. This includes licorice root and teas that have licorice. These can reduce potassium levels in your body.  Eat plenty of the following high-potassium foods:    Fruits. Good choices are apricots (canned and fresh), bananas, cantaloupe, honeydew melon, kiwi, nectarines, oranges, orange juice, and pears. Dried fruits include apricots, dates, figs, and prunes. Prune juice also has potassium.    Vegetables. Good choices are asparagus, avocado, artichoke, broccoli, bamboo shoots, beets, Framingham sprouts, cabbage, celery, chard, okra, potatoes (white and sweet), pumpkin, rutabaga, spinach (cooked), squash, and tomatoes. Tomato sauce, tomato juice, and vegetable juice cocktail are also good choices.    Chicken, fish, clams, and crab    Milk, chocolate milk, buttermilk, and soy milk    Legumes. These include black-eyed peas, chickpeas, lentils, lima beans, navy beans, red kidney beans, soybeans, and split peas.    Nuts and seeds. Try almonds, Brazil nuts, cashews, peanuts, peanut butter, pecans, pumpkin seeds, sunflower seeds, and walnuts.    Breads and cereals. These include bran and whole-grain products.    Others foods include chocolate, cocoa, coconut milk, and molasses  Follow-up  Make a follow-up appointment for a repeat  test.  When to call your healthcare provider  Call your healthcare provider right away or go to the ER if you have any of the following:    Vomiting    Extreme tiredness (fatigue)    Diarrhea    Rapid, irregular heartbeat    Shortness of breath    Chest pain    Muscle cramps, spasms, or twitching    Weakness    Paralysis   Date Last Reviewed: 11/1/2017 2000-2019 [employer]. [address] [place] [address]. All rights reserved. This information is not intended as a substitute for professional medical care. Always follow your healthcare professional's instructions.      [facility] Triage and after hours / weekends / holidays:  [phone]    Please call the triage or after hours line if you experience a temperature greater than or equal to 100.5, shaking chills, have uncontrolled nausea, vomiting and/or diarrhea, dizziness, shortness of breath, chest pain, bleeding, unexplained bruising, or if you have any other new/concerning symptoms, questions or concerns.      If you are having any concerning symptoms or wish to speak to a provider before your next infusion visit, please call your care coordinator or triage to notify them so we can adequately serve you.     If you need a refill on a narcotic prescription or other medication, please call before your infusion appointment.                 May 2020      Angel[place] Monday Tuesday Wednesday Thursday Friday Saturday                            1     2       3     4    [facility] DRAW   6:45 AM   (15 min.)   [facility]   [facility] Lab Draw    TELEPHONE VISIT RETURN   7:30 AM   (50 min.)   Julienne Eden PA-C   [facility] ONC INFUSION 360   9:00 AM   (360 min.)    ONCOLOGY INFUSION   [facility] 5    [facility] ONC INFUSION 60   2:00 PM   (60 min.)    ONCOLOGY INFUSION   [facility] 6     7     8     9       10     11     12     13     14     15     16       17      18    [facility] DRAW   9:00 AM   (15 min.)    [facility]   [facility] Lab Draw    VIDEO VISIT RETURN  12:15 PM   (50 min.)   Roxana Sweet PA-C   [facility] 19    [facility] ONC INFUSION 360  10:30 AM   (360 min.)    ONCOLOGY INFUSION   [facility] 20     21     22     23       24     25     26     27     28     29     30       31 June 2020 Sunday Monday Tuesday Wednesday Thursday Friday Saturday        1     2     3     4    VIDEO VISIT RETURN   8:45 AM   (40 min.)   Precious Scott MD   [facility] 5     6       7     8     9     10     11     12     13       14     15     16     17     18     19     20       21     22     23     24     25     26     27       28     29     30                                         Lab Results:  No results found for this or any previous visit (from the past 12 hour(s)).

## 2020-05-08 ENCOUNTER — TELEPHONE (OUTPATIENT)
Dept: ONCOLOGY | Facility: CLINIC | Age: 55
End: 2020-05-08

## 2020-05-08 NOTE — TELEPHONE ENCOUNTER
Pt calling to triage to report continued peripheral neuropathy in hands and feet. Still able to do ADL's, can grasp and ambulate unaffected. Her right ankle and calf feel tight. Rates this 4/10. Has had some nausea for which she uses zofran. Did vomit x 1 yesterday. Is drinking a lot of fluids but not much food. Denies fever.    Paged to Julienne Eden PA-C    To order US of right leg. Called back to pt to update. She feels its really both legs and ankles not just right. Is also having cold sensitivity when she eats.    Paged to Julienne again. No ultrasound needed. If increased pain or swelling . To call back to triage.    Pt updated Understands and agrees with plan.

## 2020-05-17 DIAGNOSIS — R10.11 RUQ ABDOMINAL PAIN: ICD-10-CM

## 2020-05-17 DIAGNOSIS — C16.9 GASTRIC ADENOCARCINOMA (H): ICD-10-CM

## 2020-05-18 ENCOUNTER — VIRTUAL VISIT (OUTPATIENT)
Dept: ONCOLOGY | Facility: CLINIC | Age: 55
DRG: 178 | End: 2020-05-18
Attending: PHYSICIAN ASSISTANT
Payer: MEDICARE

## 2020-05-18 ENCOUNTER — PATIENT OUTREACH (OUTPATIENT)
Dept: ONCOLOGY | Facility: CLINIC | Age: 55
End: 2020-05-18

## 2020-05-18 VITALS
BODY MASS INDEX: 26.97 KG/M2 | TEMPERATURE: 97.4 F | HEART RATE: 101 BPM | DIASTOLIC BLOOD PRESSURE: 54 MMHG | WEIGHT: 177.4 LBS | SYSTOLIC BLOOD PRESSURE: 112 MMHG | OXYGEN SATURATION: 98 %

## 2020-05-18 DIAGNOSIS — R10.11 RUQ ABDOMINAL PAIN: ICD-10-CM

## 2020-05-18 DIAGNOSIS — C16.9 GASTRIC CANCER (H): Primary | ICD-10-CM

## 2020-05-18 DIAGNOSIS — R63.4 WEIGHT LOSS: ICD-10-CM

## 2020-05-18 DIAGNOSIS — C16.9 GASTRIC ADENOCARCINOMA (H): Primary | ICD-10-CM

## 2020-05-18 LAB
ALBUMIN SERPL-MCNC: 2.9 G/DL (ref 3.4–5)
ALP SERPL-CCNC: 123 U/L (ref 40–150)
ALT SERPL W P-5'-P-CCNC: 38 U/L (ref 0–50)
ANION GAP SERPL CALCULATED.3IONS-SCNC: 9 MMOL/L (ref 3–14)
ANISOCYTOSIS BLD QL SMEAR: SLIGHT
AST SERPL W P-5'-P-CCNC: 35 U/L (ref 0–45)
BASOPHILS # BLD AUTO: 0.1 10E9/L (ref 0–0.2)
BASOPHILS NFR BLD AUTO: 4 %
BILIRUB SERPL-MCNC: 0.3 MG/DL (ref 0.2–1.3)
BUN SERPL-MCNC: 11 MG/DL (ref 7–30)
CALCIUM SERPL-MCNC: 9.1 MG/DL (ref 8.5–10.1)
CHLORIDE SERPL-SCNC: 106 MMOL/L (ref 94–109)
CO2 SERPL-SCNC: 24 MMOL/L (ref 20–32)
CREAT SERPL-MCNC: 0.99 MG/DL (ref 0.52–1.04)
DIFFERENTIAL METHOD BLD: ABNORMAL
EOSINOPHIL # BLD AUTO: 0 10E9/L (ref 0–0.7)
EOSINOPHIL NFR BLD AUTO: 1 %
ERYTHROCYTE [DISTWIDTH] IN BLOOD BY AUTOMATED COUNT: 16.2 % (ref 10–15)
GFR SERPL CREATININE-BSD FRML MDRD: 65 ML/MIN/{1.73_M2}
GLUCOSE SERPL-MCNC: 97 MG/DL (ref 70–99)
HCT VFR BLD AUTO: 32.2 % (ref 35–47)
HGB BLD-MCNC: 10.1 G/DL (ref 11.7–15.7)
LYMPHOCYTES # BLD AUTO: 2.1 10E9/L (ref 0.8–5.3)
LYMPHOCYTES NFR BLD AUTO: 63 %
MCH RBC QN AUTO: 26.4 PG (ref 26.5–33)
MCHC RBC AUTO-ENTMCNC: 31.4 G/DL (ref 31.5–36.5)
MCV RBC AUTO: 84 FL (ref 78–100)
MONOCYTES # BLD AUTO: 0.9 10E9/L (ref 0–1.3)
MONOCYTES NFR BLD AUTO: 25 %
NEUTROPHILS # BLD AUTO: 0.2 10E9/L (ref 1.6–8.3)
NEUTROPHILS NFR BLD AUTO: 7 %
PLATELET # BLD AUTO: 204 10E9/L (ref 150–450)
PLATELET # BLD EST: ABNORMAL 10*3/UL
POTASSIUM SERPL-SCNC: 3.4 MMOL/L (ref 3.4–5.3)
PROT SERPL-MCNC: 6.6 G/DL (ref 6.8–8.8)
RBC # BLD AUTO: 3.82 10E12/L (ref 3.8–5.2)
SODIUM SERPL-SCNC: 139 MMOL/L (ref 133–144)
WBC # BLD AUTO: 3.4 10E9/L (ref 4–11)

## 2020-05-18 PROCEDURE — 85025 COMPLETE CBC W/AUTO DIFF WBC: CPT | Mod: GT | Performed by: PHYSICIAN ASSISTANT

## 2020-05-18 PROCEDURE — 36591 DRAW BLOOD OFF VENOUS DEVICE: CPT

## 2020-05-18 PROCEDURE — 99215 OFFICE O/P EST HI 40 MIN: CPT | Mod: 95 | Performed by: PHYSICIAN ASSISTANT

## 2020-05-18 PROCEDURE — 25000128 H RX IP 250 OP 636: Performed by: INTERNAL MEDICINE

## 2020-05-18 PROCEDURE — 80053 COMPREHEN METABOLIC PANEL: CPT | Mod: GT | Performed by: PHYSICIAN ASSISTANT

## 2020-05-18 RX ORDER — MEPERIDINE HYDROCHLORIDE 25 MG/ML
25 INJECTION INTRAMUSCULAR; INTRAVENOUS; SUBCUTANEOUS EVERY 30 MIN PRN
Status: CANCELLED | OUTPATIENT
Start: 2020-05-27

## 2020-05-18 RX ORDER — METHYLPREDNISOLONE SODIUM SUCCINATE 125 MG/2ML
125 INJECTION, POWDER, LYOPHILIZED, FOR SOLUTION INTRAMUSCULAR; INTRAVENOUS
Status: CANCELLED
Start: 2020-05-27

## 2020-05-18 RX ORDER — TRAMADOL HYDROCHLORIDE 50 MG/1
50 TABLET ORAL EVERY 6 HOURS PRN
Qty: 60 TABLET | Refills: 0 | Status: SHIPPED | OUTPATIENT
Start: 2020-05-18 | End: 2020-06-10

## 2020-05-18 RX ORDER — ALBUTEROL SULFATE 90 UG/1
1-2 AEROSOL, METERED RESPIRATORY (INHALATION)
Status: CANCELLED
Start: 2020-05-27

## 2020-05-18 RX ORDER — SODIUM CHLORIDE 9 MG/ML
1000 INJECTION, SOLUTION INTRAVENOUS CONTINUOUS PRN
Status: CANCELLED
Start: 2020-05-27

## 2020-05-18 RX ORDER — ALBUTEROL SULFATE 0.83 MG/ML
2.5 SOLUTION RESPIRATORY (INHALATION)
Status: CANCELLED | OUTPATIENT
Start: 2020-05-27

## 2020-05-18 RX ORDER — LORAZEPAM 2 MG/ML
0.5 INJECTION INTRAMUSCULAR EVERY 4 HOURS PRN
Status: CANCELLED
Start: 2020-05-27

## 2020-05-18 RX ORDER — DIPHENHYDRAMINE HYDROCHLORIDE 50 MG/ML
50 INJECTION INTRAMUSCULAR; INTRAVENOUS
Status: CANCELLED
Start: 2020-05-27

## 2020-05-18 RX ORDER — NALOXONE HYDROCHLORIDE 0.4 MG/ML
.1-.4 INJECTION, SOLUTION INTRAMUSCULAR; INTRAVENOUS; SUBCUTANEOUS
Status: CANCELLED | OUTPATIENT
Start: 2020-05-27

## 2020-05-18 RX ORDER — HEPARIN SODIUM (PORCINE) LOCK FLUSH IV SOLN 100 UNIT/ML 100 UNIT/ML
5 SOLUTION INTRAVENOUS
Status: CANCELLED | OUTPATIENT
Start: 2020-05-28

## 2020-05-18 RX ORDER — HEPARIN SODIUM (PORCINE) LOCK FLUSH IV SOLN 100 UNIT/ML 100 UNIT/ML
5 SOLUTION INTRAVENOUS EVERY 8 HOURS
Status: DISCONTINUED | OUTPATIENT
Start: 2020-05-18 | End: 2020-05-18 | Stop reason: HOSPADM

## 2020-05-18 RX ORDER — TRAMADOL HYDROCHLORIDE 50 MG/1
TABLET ORAL
Qty: 60 TABLET | Refills: 0 | OUTPATIENT
Start: 2020-05-18

## 2020-05-18 RX ORDER — EPINEPHRINE 1 MG/ML
0.3 INJECTION, SOLUTION INTRAMUSCULAR; SUBCUTANEOUS EVERY 5 MIN PRN
Status: CANCELLED | OUTPATIENT
Start: 2020-05-27

## 2020-05-18 RX ORDER — EPINEPHRINE 0.3 MG/.3ML
0.3 INJECTION SUBCUTANEOUS EVERY 5 MIN PRN
Status: CANCELLED | OUTPATIENT
Start: 2020-05-27

## 2020-05-18 RX ADMIN — Medication 5 ML: at 09:40

## 2020-05-18 ASSESSMENT — PAIN SCALES - GENERAL: PAINLEVEL: MODERATE PAIN (5)

## 2020-05-18 NOTE — NURSING NOTE
Chief Complaint   Patient presents with     Port Flush     Port de-accessed by RN in lab, previous hep locked. Lab only appt.      Corazon Jones RN

## 2020-05-18 NOTE — NURSING NOTE
"Chief Complaint   Patient presents with     Port Draw     labs drawn from port by RN. VS taken       Port accessed with 20 g 3/4\" gripper needle. Labs drawn. Per pt request, port remained accessed for infusion tomorrow- locked with heparin.   Discharged to [facility].        Administrations This Visit     heparin 100 UNIT/ML injection 5 mL     Admin Date  05/18/2020 Action  Given Dose  5 mL Route  Intracatheter Administered By  Tammy Bailey, RN          sodium chloride (PF) 0.9% PF flush 10 mL     Admin Date  05/18/2020 Action  Given Dose  10 mL Route  Intracatheter Administered By  Tammy Bailey, RN                Tammy Bailey, REBECCA    "

## 2020-05-18 NOTE — PROGRESS NOTES
"Nathalie Bashir is a 54 year old female who is being evaluated via a billable video visit.      The patient has been notified of following:     \"This video visit will be conducted via a call between you and your physician/provider. We have found that certain health care needs can be provided without the need for an in-person physical exam.  This service lets us provide the care you need with a video conversation.  If a prescription is necessary we can send it directly to your pharmacy.  If lab work is needed we can place an order for that and you can then stop by our lab to have the test done at a later time.    Video visits are billed at different rates depending on your insurance coverage.  Please reach out to your insurance provider with any questions.    If during the course of the call the physician/provider feels a video visit is not appropriate, you will not be charged for this service.\"    Patient has given verbal consent for Video visit? Yes    How would you like to obtain your AVS? MyChart    Patient would like the video invitation sent by: Text to cell phone: [phone]    Will anyone else be joining your video visit? No     Secondary Video Option (Doximity), send text message to:  [phone]    I have reviewed and updated the patient's allergies and medication list.    Concerns: Patient has no new concerns.      Refills: None      HERLINDA Buchanan    Oncology/Hematology Visit Note  May 18, 2020    Oncologic History  Ms. Bashir is a 54 year old female with a history of gastric adenocarcinoma.  She was being followed for a pancreatic duct dilatation and pancreatic cyst which was noted in November 2018.  Since May 2019 she started noticing some nausea and vomiting and occasional abdominal discomfort.  She had an ultrasound in August 2019 which was essentially unremarkable.  She had a repeat endoscopic ultrasound on 10/29/2019 which showed increase in size of the cyst as well as dilation of the main " pancreatic duct.  FNA and fluid analysis showed mucinous epithelium.  She was referred to Dr. 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  Surprisingly there was no pancreatic ductal neoplasm seen but on the resected specimen stomach cancer was noted.  It was poorly differentiated adenocarcinoma with poorly cohesive/signet ring cell type with proximal gastric mucosal and serosal margins being positive.  There was lymphovascular and perineural invasion seen.  22 lymph nodes were sampled and all were benign.  It was a pT4aN0 lesion.  HER-2/christine FISH was not amplified.         EUS on 3/3/2020 without clear evidence of neoplasm endoscopically. Left gastric lymph node fine-needle aspiration was negative for carcinoma. Biopsy from the gastric jejunal anastomosis as well as lesser curvature of the stomach also did not show any malignancy.     She had a PET/CT on 3/13/2020 which showed soft tissue streaky density with increased FDG uptake adjacent to the pancreaticojejunostomy which could be neoplastic in origin.  There is mild increased FDG uptake in the gastric remnant.  Focal area of soft tissue thickening with fatty streakiness along medial laparotomy with increased FDG uptake which likely is inflammatory. Increased FDG uptake in thoracic esophagus which could be esophagitis.     She started on chemotherapy with 5FU, oxaliplatin, and Taxotere on 3/27/20. She was seen on 4/7/20 for evaluation of fevers. She was treated for possible pneumonia with Levaquin.     She was seen in clinic 4/13/20 for RUQ pain,CT and labs reassuring.     She was called today for oncology follow-up prior to cycle 4 5FU, Oxaliplatin, and Taxotere.     Interval History:  -Has a tough couple of weeks.  -Still has ongoing stomach pain. Tried tramadol that helped for a while. Pain is ongoing on the left side. Takes 2-3 tramadol per day  -Takes medication for acid and indigestion. She takes Carafate with some  "relief.   -Symptoms occur every 2-3 days.   -Has had increased nausea, did vomit last week. Takes lorazepam which helps.  -Has not had gas pains.   -Is having normal bowel movements.  -Eating is fair and drinking is going okay.   -Takes Reglan at lunch to help with eating.   -Neuropathy is a little better lately.     Objective:  Video physical exam  General: Patient appears well in no acute distress. Normal body habitus.  Skin: No visualized rash or lesions on visualized skin  Eyes: EOMI, no erythema, sclera icterus or discharge noted  Resp: Appears to be breathing comfortably without accessory muscle usage, speaking in full sentences, no cough  MSK: Appears to have normal range of motion based on visualized movements  Neurologic: No apparent tremors, facial movements symmetric  Psych: affect normal, alert and oriented  The rest of a comprehensive physical examination is deferred due to PHE (public health emergency) video restrictions\"    Labs:   5/18/2020 09:28   Sodium 139   Potassium 3.4   Chloride 106   Carbon Dioxide 24   Urea Nitrogen 11   Creatinine 0.99   GFR Estimate 65   GFR Estimate If Black 75   Calcium 9.1   Anion Gap 9   Albumin 2.9 (L)   Protein Total 6.6 (L)   Bilirubin Total 0.3   Alkaline Phosphatase 123   ALT 38   AST 35   Glucose 97   WBC 3.4 (L)   Hemoglobin 10.1 (L)   Hematocrit 32.2 (L)   Platelet Count 204   RBC Count 3.82   MCV 84   MCH 26.4 (L)   MCHC 31.4 (L)   RDW 16.2 (H)   Diff Method Manual Differential   % Neutrophils 7.0   % Lymphocytes 63.0   % Monocytes 25.0   % Eosinophils 1.0   % Basophils 4.0   Absolute Neutrophil 0.2 (LL)   Absolute Lymphocytes 2.1   Absolute Monocytes 0.9   Absolute Eosinophils 0.0   Absolute Basophils 0.1   Anisocytosis Slight   Platelet Estimate Confirming automated cell count     Assessment and Plan:  1. 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 This was found on Whipple surgery specimen as it was " done for presumed pancreatic IPMN although no pancreatic neoplasm was found.  It was a pT4aN0 lesion with lymphovascular invasion and perineural invasion seen. All 22 lymph nodes were negative.    She was started on neoadjuvant chemotherapy with FLOT (5FU, Oxaliplatin, Docetaxel) 3/27/20. Cycle 2 delayed for pneumonia, now recovered. Resumed on 4/20/20. Continues to tolerate therapy overall well, though due to neutropenia, treatment with cycle 4 will be held tomorrow. She will return the following week for consideration of resuming chemotherapy. She will have a PET/CT and follow-up with Dr. Pollard after cycle 4.     2. GI  Gastritis: Ongoing, worse with high dose steroids, adjusted previously. Continue Pantoprazole 40mg BID and Famotidine 20mg BID. Discussed making the Carafate a liquid by crushing the pill and mixing with water. Recommend drinking this bid scheduled, as she has been only taking it prn.     RUQ/epigastric pain: Has had CT imaging with no acute findings. Suspect post surgical plus gastritis. Ok to continue Tramadol PRN, currently using 3-4 times per day. Hopefully controlling gastritis will help. Will follow-up on this pain with her upcoming imaging.     3. Heme  Neutropenia. We reviewed the importance of calling/going to the ED with a fever of 100.4F or higher and avoiding sick contacts.     4. Neuro  Baseline neuropathy in her feet for > 10 years. Unchanged. Has some transient symptoms in her hands. No apparent motor symptoms at this time. No need for dose reduction. Cold sensitivity neuropathy lasting longer each cycle.    5. FEN  Early Satiety: Continue Reglan PRN, usually only at lunch    Hypokalemia: Improved with eating high potassium foods to start including in her diet     Weight loss: Recommend trying [place] Instant Breakfast with whole milk, drinking 2 per day. She also agreed to a dietician referral.     Elevated creatinine. Still within the normal range, but higher than her recent  baseline. Suspect related to mild dehydration. Recommend pushing fluids with a goal of 6-8 cups per day.    Video-Visit Details    Type of service:  Video Visit    Video Start Time: 12:40 PM  Video End Time: 12:52 PM    Originating Location (pt. Location): Home    Distant Location (provider location):  [facility]     Platform used for Video Visit: Miguel A Sweet PA-C  [facility]  [address] [place] [id number] [phone]

## 2020-05-18 NOTE — PROGRESS NOTES
Spoke to Nathalie to review neutropenic precautions and s/sx to report to clinic.    Nathalie verbalized understanding and knows scheduling will call her today to move her chemo out one week.

## 2020-05-20 ENCOUNTER — TELEPHONE (OUTPATIENT)
Dept: ONCOLOGY | Facility: CLINIC | Age: 55
End: 2020-05-20

## 2020-05-20 NOTE — TELEPHONE ENCOUNTER
"Pt calling to report \"she is anemic and neutropenic\". States she is very fatigued and sleeping a lot. Denies weakness, dizziness, shortness of breath. Has been trying to eat a lot of protein including protein shakes. Denies cough, fever or other infectious sx.     Discussed with Vazquez Camacho PA-C    Ok to monitor at home, push fluids and rest. If develops fever to ED If  is sob, weak or dizzy, to be seen in clinic. If sx not improving should be seen in clinic on Friday    Reviewed recommendations with Nathalie. Understands and agrees with plan.  "

## 2020-05-21 ENCOUNTER — APPOINTMENT (OUTPATIENT)
Dept: CT IMAGING | Facility: CLINIC | Age: 55
DRG: 178 | End: 2020-05-21
Attending: EMERGENCY MEDICINE
Payer: MEDICARE

## 2020-05-21 ENCOUNTER — APPOINTMENT (OUTPATIENT)
Dept: GENERAL RADIOLOGY | Facility: CLINIC | Age: 55
DRG: 178 | End: 2020-05-21
Attending: EMERGENCY MEDICINE
Payer: MEDICARE

## 2020-05-21 ENCOUNTER — HOSPITAL ENCOUNTER (INPATIENT)
Facility: CLINIC | Age: 55
LOS: 2 days | Discharge: HOME | DRG: 178 | End: 2020-05-23
Attending: EMERGENCY MEDICINE | Admitting: INTERNAL MEDICINE
Payer: MEDICARE

## 2020-05-21 DIAGNOSIS — D49.0 IPMN (INTRADUCTAL PAPILLARY MUCINOUS NEOPLASM): ICD-10-CM

## 2020-05-21 DIAGNOSIS — R50.9 FEVER, UNSPECIFIED FEVER CAUSE: ICD-10-CM

## 2020-05-21 DIAGNOSIS — Z20.822 PERSON UNDER INVESTIGATION FOR COVID-19: ICD-10-CM

## 2020-05-21 DIAGNOSIS — F11.20 CONTINUOUS OPIOID DEPENDENCE (H): ICD-10-CM

## 2020-05-21 DIAGNOSIS — Z20.828 EXPOSURE TO SARS-ASSOCIATED CORONAVIRUS: ICD-10-CM

## 2020-05-21 DIAGNOSIS — C16.9 GASTRIC ADENOCARCINOMA (H): ICD-10-CM

## 2020-05-21 DIAGNOSIS — R91.8 PULMONARY INFILTRATE: ICD-10-CM

## 2020-05-21 DIAGNOSIS — C16.3 MALIGNANT NEOPLASM OF PYLORIC ANTRUM (H): ICD-10-CM

## 2020-05-21 DIAGNOSIS — J18.9 PNEUMONIA OF LEFT UPPER LOBE DUE TO INFECTIOUS ORGANISM: Primary | ICD-10-CM

## 2020-05-21 LAB
ALBUMIN SERPL-MCNC: 2.8 G/DL (ref 3.4–5)
ALBUMIN UR-MCNC: NEGATIVE MG/DL
ALP SERPL-CCNC: 141 U/L (ref 40–150)
ALT SERPL W P-5'-P-CCNC: 36 U/L (ref 0–50)
ANION GAP SERPL CALCULATED.3IONS-SCNC: 7 MMOL/L (ref 3–14)
APPEARANCE UR: CLEAR
AST SERPL W P-5'-P-CCNC: 45 U/L (ref 0–45)
BASOPHILS # BLD AUTO: 0 10E9/L (ref 0–0.2)
BASOPHILS NFR BLD AUTO: 0.5 %
BILIRUB SERPL-MCNC: 0.2 MG/DL (ref 0.2–1.3)
BILIRUB UR QL STRIP: NEGATIVE
BUN SERPL-MCNC: 13 MG/DL (ref 7–30)
CALCIUM SERPL-MCNC: 8.2 MG/DL (ref 8.5–10.1)
CHLORIDE SERPL-SCNC: 104 MMOL/L (ref 94–109)
CO2 SERPL-SCNC: 25 MMOL/L (ref 20–32)
COLOR UR AUTO: YELLOW
CREAT SERPL-MCNC: 0.78 MG/DL (ref 0.52–1.04)
CREAT SERPL-MCNC: 0.84 MG/DL (ref 0.52–1.04)
DIFFERENTIAL METHOD BLD: ABNORMAL
EOSINOPHIL # BLD AUTO: 0 10E9/L (ref 0–0.7)
EOSINOPHIL NFR BLD AUTO: 0.1 %
ERYTHROCYTE [DISTWIDTH] IN BLOOD BY AUTOMATED COUNT: 17.1 % (ref 10–15)
GFR SERPL CREATININE-BSD FRML MDRD: 79 ML/MIN/{1.73_M2}
GFR SERPL CREATININE-BSD FRML MDRD: 87 ML/MIN/{1.73_M2}
GLUCOSE SERPL-MCNC: 114 MG/DL (ref 70–99)
GLUCOSE UR STRIP-MCNC: NEGATIVE MG/DL
HCT VFR BLD AUTO: 32.2 % (ref 35–47)
HGB BLD-MCNC: 9.7 G/DL (ref 11.7–15.7)
HGB UR QL STRIP: NEGATIVE
IMM GRANULOCYTES # BLD: 0.1 10E9/L (ref 0–0.4)
IMM GRANULOCYTES NFR BLD: 1.3 %
INTERPRETATION ECG - MUSE: NORMAL
KETONES UR STRIP-MCNC: NEGATIVE MG/DL
LACTATE BLD-SCNC: 1 MMOL/L (ref 0.7–2)
LEUKOCYTE ESTERASE UR QL STRIP: NEGATIVE
LIPASE SERPL-CCNC: 40 U/L (ref 73–393)
LYMPHOCYTES # BLD AUTO: 1.1 10E9/L (ref 0.8–5.3)
LYMPHOCYTES NFR BLD AUTO: 14 %
MCH RBC QN AUTO: 25.9 PG (ref 26.5–33)
MCHC RBC AUTO-ENTMCNC: 30.1 G/DL (ref 31.5–36.5)
MCV RBC AUTO: 86 FL (ref 78–100)
MONOCYTES # BLD AUTO: 1.4 10E9/L (ref 0–1.3)
MONOCYTES NFR BLD AUTO: 18.1 %
MRSA DNA SPEC QL NAA+PROBE: NEGATIVE
MUCOUS THREADS #/AREA URNS LPF: PRESENT /LPF
NEUTROPHILS # BLD AUTO: 5 10E9/L (ref 1.6–8.3)
NEUTROPHILS NFR BLD AUTO: 66 %
NITRATE UR QL: NEGATIVE
NRBC # BLD AUTO: 0 10*3/UL
NRBC BLD AUTO-RTO: 0 /100
PH UR STRIP: 6 PH (ref 5–7)
PLATELET # BLD AUTO: 230 10E9/L (ref 150–450)
PLATELET # BLD AUTO: 266 10E9/L (ref 150–450)
POTASSIUM SERPL-SCNC: 3.4 MMOL/L (ref 3.4–5.3)
PROCALCITONIN SERPL-MCNC: <0.05 NG/ML
PROT SERPL-MCNC: 6 G/DL (ref 6.8–8.8)
RBC # BLD AUTO: 3.75 10E12/L (ref 3.8–5.2)
RBC #/AREA URNS AUTO: 2 /HPF (ref 0–2)
SARS-COV-2 PCR COMMENT: NORMAL
SARS-COV-2 RNA SPEC QL NAA+PROBE: NEGATIVE
SARS-COV-2 RNA SPEC QL NAA+PROBE: NORMAL
SODIUM SERPL-SCNC: 136 MMOL/L (ref 133–144)
SOURCE: ABNORMAL
SP GR UR STRIP: 1.01 (ref 1–1.03)
SPECIMEN SOURCE: NORMAL
SQUAMOUS #/AREA URNS AUTO: 3 /HPF (ref 0–1)
TROPONIN I SERPL-MCNC: <0.015 UG/L (ref 0–0.04)
UROBILINOGEN UR STRIP-MCNC: NORMAL MG/DL (ref 0–2)
WBC # BLD AUTO: 7.6 10E9/L (ref 4–11)
WBC #/AREA URNS AUTO: 1 /HPF (ref 0–5)

## 2020-05-21 PROCEDURE — 87486 CHLMYD PNEUM DNA AMP PROBE: CPT | Performed by: INTERNAL MEDICINE

## 2020-05-21 PROCEDURE — 87641 MR-STAPH DNA AMP PROBE: CPT | Performed by: INTERNAL MEDICINE

## 2020-05-21 PROCEDURE — 87581 M.PNEUMON DNA AMP PROBE: CPT | Performed by: INTERNAL MEDICINE

## 2020-05-21 PROCEDURE — 96375 TX/PRO/DX INJ NEW DRUG ADDON: CPT

## 2020-05-21 PROCEDURE — 83690 ASSAY OF LIPASE: CPT | Performed by: EMERGENCY MEDICINE

## 2020-05-21 PROCEDURE — 93005 ELECTROCARDIOGRAM TRACING: CPT

## 2020-05-21 PROCEDURE — 96367 TX/PROPH/DG ADDL SEQ IV INF: CPT

## 2020-05-21 PROCEDURE — 99285 EMERGENCY DEPT VISIT HI MDM: CPT | Mod: 25 | Performed by: EMERGENCY MEDICINE

## 2020-05-21 PROCEDURE — 93010 ELECTROCARDIOGRAM REPORT: CPT | Mod: Z6 | Performed by: EMERGENCY MEDICINE

## 2020-05-21 PROCEDURE — 81001 URINALYSIS AUTO W/SCOPE: CPT | Performed by: EMERGENCY MEDICINE

## 2020-05-21 PROCEDURE — 12000001 ZZH R&B MED SURG/OB UMMC

## 2020-05-21 PROCEDURE — 71045 X-RAY EXAM CHEST 1 VIEW: CPT

## 2020-05-21 PROCEDURE — 96361 HYDRATE IV INFUSION ADD-ON: CPT

## 2020-05-21 PROCEDURE — 85025 COMPLETE CBC W/AUTO DIFF WBC: CPT | Performed by: EMERGENCY MEDICINE

## 2020-05-21 PROCEDURE — 25000132 ZZH RX MED GY IP 250 OP 250 PS 637: Mod: GY | Performed by: INTERNAL MEDICINE

## 2020-05-21 PROCEDURE — 83605 ASSAY OF LACTIC ACID: CPT | Performed by: EMERGENCY MEDICINE

## 2020-05-21 PROCEDURE — 82565 ASSAY OF CREATININE: CPT | Performed by: INTERNAL MEDICINE

## 2020-05-21 PROCEDURE — 87040 BLOOD CULTURE FOR BACTERIA: CPT | Performed by: EMERGENCY MEDICINE

## 2020-05-21 PROCEDURE — U0003 INFECTIOUS AGENT DETECTION BY NUCLEIC ACID (DNA OR RNA); SEVERE ACUTE RESPIRATORY SYNDROME CORONAVIRUS 2 (SARS-COV-2) (CORONAVIRUS DISEASE [COVID-19]), AMPLIFIED PROBE TECHNIQUE, MAKING USE OF HIGH THROUGHPUT TECHNOLOGIES AS DESCRIBED BY CMS-2020-01-R: HCPCS | Performed by: EMERGENCY MEDICINE

## 2020-05-21 PROCEDURE — 99285 EMERGENCY DEPT VISIT HI MDM: CPT | Mod: 25

## 2020-05-21 PROCEDURE — 87640 STAPH A DNA AMP PROBE: CPT | Performed by: INTERNAL MEDICINE

## 2020-05-21 PROCEDURE — 84145 PROCALCITONIN (PCT): CPT | Performed by: EMERGENCY MEDICINE

## 2020-05-21 PROCEDURE — 25000128 H RX IP 250 OP 636: Performed by: INTERNAL MEDICINE

## 2020-05-21 PROCEDURE — 87633 RESP VIRUS 12-25 TARGETS: CPT | Performed by: INTERNAL MEDICINE

## 2020-05-21 PROCEDURE — 99223 1ST HOSP IP/OBS HIGH 75: CPT | Mod: AI | Performed by: INTERNAL MEDICINE

## 2020-05-21 PROCEDURE — 36592 COLLECT BLOOD FROM PICC: CPT | Performed by: INTERNAL MEDICINE

## 2020-05-21 PROCEDURE — 96365 THER/PROPH/DIAG IV INF INIT: CPT

## 2020-05-21 PROCEDURE — 71275 CT ANGIOGRAPHY CHEST: CPT

## 2020-05-21 PROCEDURE — 85049 AUTOMATED PLATELET COUNT: CPT | Performed by: INTERNAL MEDICINE

## 2020-05-21 PROCEDURE — 25000132 ZZH RX MED GY IP 250 OP 250 PS 637: Mod: GY | Performed by: EMERGENCY MEDICINE

## 2020-05-21 PROCEDURE — 84484 ASSAY OF TROPONIN QUANT: CPT | Performed by: EMERGENCY MEDICINE

## 2020-05-21 PROCEDURE — 25800030 ZZH RX IP 258 OP 636: Performed by: EMERGENCY MEDICINE

## 2020-05-21 PROCEDURE — 25000128 H RX IP 250 OP 636: Performed by: EMERGENCY MEDICINE

## 2020-05-21 PROCEDURE — 25000128 H RX IP 250 OP 636: Performed by: GENERAL PRACTICE

## 2020-05-21 PROCEDURE — 80053 COMPREHEN METABOLIC PANEL: CPT | Performed by: EMERGENCY MEDICINE

## 2020-05-21 RX ORDER — AMOXICILLIN 250 MG
2 CAPSULE ORAL
Status: DISCONTINUED | OUTPATIENT
Start: 2020-05-21 | End: 2020-05-23 | Stop reason: HOSPADM

## 2020-05-21 RX ORDER — CALCIUM CARBONATE 500 MG/1
500 TABLET, CHEWABLE ORAL DAILY PRN
Status: DISCONTINUED | OUTPATIENT
Start: 2020-05-21 | End: 2020-05-23 | Stop reason: HOSPADM

## 2020-05-21 RX ORDER — ACETAMINOPHEN 500 MG
1000 TABLET ORAL ONCE
Status: COMPLETED | OUTPATIENT
Start: 2020-05-21 | End: 2020-05-21

## 2020-05-21 RX ORDER — POTASSIUM CHLORIDE 1.5 G/1.58G
20-40 POWDER, FOR SOLUTION ORAL
Status: DISCONTINUED | OUTPATIENT
Start: 2020-05-21 | End: 2020-05-23 | Stop reason: HOSPADM

## 2020-05-21 RX ORDER — POTASSIUM CHLORIDE 29.8 MG/ML
20 INJECTION INTRAVENOUS
Status: DISCONTINUED | OUTPATIENT
Start: 2020-05-21 | End: 2020-05-23 | Stop reason: HOSPADM

## 2020-05-21 RX ORDER — HYDROMORPHONE HYDROCHLORIDE 1 MG/ML
0.5 INJECTION, SOLUTION INTRAMUSCULAR; INTRAVENOUS; SUBCUTANEOUS ONCE
Status: COMPLETED | OUTPATIENT
Start: 2020-05-21 | End: 2020-05-21

## 2020-05-21 RX ORDER — MORPHINE SULFATE 4 MG/ML
4 INJECTION, SOLUTION INTRAMUSCULAR; INTRAVENOUS ONCE
Status: COMPLETED | OUTPATIENT
Start: 2020-05-21 | End: 2020-05-21

## 2020-05-21 RX ORDER — OXYCODONE HYDROCHLORIDE 5 MG/1
5 TABLET ORAL ONCE
Status: COMPLETED | OUTPATIENT
Start: 2020-05-21 | End: 2020-05-22

## 2020-05-21 RX ORDER — CEFTRIAXONE 1 G/1
1 INJECTION, POWDER, FOR SOLUTION INTRAMUSCULAR; INTRAVENOUS EVERY 24 HOURS
Status: DISCONTINUED | OUTPATIENT
Start: 2020-05-22 | End: 2020-05-22

## 2020-05-21 RX ORDER — FAMOTIDINE 20 MG/1
20 TABLET, FILM COATED ORAL 2 TIMES DAILY
Status: DISCONTINUED | OUTPATIENT
Start: 2020-05-21 | End: 2020-05-23 | Stop reason: HOSPADM

## 2020-05-21 RX ORDER — MAGNESIUM SULFATE HEPTAHYDRATE 40 MG/ML
4 INJECTION, SOLUTION INTRAVENOUS EVERY 4 HOURS PRN
Status: DISCONTINUED | OUTPATIENT
Start: 2020-05-21 | End: 2020-05-23 | Stop reason: HOSPADM

## 2020-05-21 RX ORDER — POTASSIUM CL/LIDO/0.9 % NACL 10MEQ/0.1L
10 INTRAVENOUS SOLUTION, PIGGYBACK (ML) INTRAVENOUS
Status: DISCONTINUED | OUTPATIENT
Start: 2020-05-21 | End: 2020-05-23 | Stop reason: HOSPADM

## 2020-05-21 RX ORDER — ONDANSETRON 2 MG/ML
8 INJECTION INTRAMUSCULAR; INTRAVENOUS EVERY 8 HOURS PRN
Status: DISCONTINUED | OUTPATIENT
Start: 2020-05-21 | End: 2020-05-21

## 2020-05-21 RX ORDER — GRANISETRON HYDROCHLORIDE 1 MG/1
1 TABLET, FILM COATED ORAL EVERY 12 HOURS SCHEDULED
Status: DISCONTINUED | OUTPATIENT
Start: 2020-05-21 | End: 2020-05-21

## 2020-05-21 RX ORDER — LORAZEPAM 0.5 MG/1
0.5 TABLET ORAL EVERY 4 HOURS PRN
Status: DISCONTINUED | OUTPATIENT
Start: 2020-05-21 | End: 2020-05-23 | Stop reason: HOSPADM

## 2020-05-21 RX ORDER — SUCRALFATE ORAL 1 G/10ML
1 SUSPENSION ORAL
Status: DISCONTINUED | OUTPATIENT
Start: 2020-05-21 | End: 2020-05-22

## 2020-05-21 RX ORDER — POTASSIUM CHLORIDE 750 MG/1
20-40 TABLET, EXTENDED RELEASE ORAL
Status: DISCONTINUED | OUTPATIENT
Start: 2020-05-21 | End: 2020-05-23 | Stop reason: HOSPADM

## 2020-05-21 RX ORDER — CEFTRIAXONE 2 G/1
2 INJECTION, POWDER, FOR SOLUTION INTRAMUSCULAR; INTRAVENOUS ONCE
Status: COMPLETED | OUTPATIENT
Start: 2020-05-21 | End: 2020-05-21

## 2020-05-21 RX ORDER — SODIUM CHLORIDE 9 MG/ML
1000 INJECTION, SOLUTION INTRAVENOUS CONTINUOUS
Status: DISCONTINUED | OUTPATIENT
Start: 2020-05-21 | End: 2020-05-21

## 2020-05-21 RX ORDER — POTASSIUM CHLORIDE 7.45 MG/ML
10 INJECTION INTRAVENOUS
Status: DISCONTINUED | OUTPATIENT
Start: 2020-05-21 | End: 2020-05-23 | Stop reason: HOSPADM

## 2020-05-21 RX ORDER — GRANISETRON HYDROCHLORIDE 1 MG/1
1 TABLET, FILM COATED ORAL EVERY 12 HOURS SCHEDULED
Status: DISCONTINUED | OUTPATIENT
Start: 2020-05-21 | End: 2020-05-22

## 2020-05-21 RX ORDER — IOPAMIDOL 755 MG/ML
62 INJECTION, SOLUTION INTRAVASCULAR ONCE
Status: COMPLETED | OUTPATIENT
Start: 2020-05-21 | End: 2020-05-21

## 2020-05-21 RX ORDER — ONDANSETRON 8 MG/1
8 TABLET, FILM COATED ORAL EVERY 8 HOURS PRN
Status: DISCONTINUED | OUTPATIENT
Start: 2020-05-21 | End: 2020-05-21

## 2020-05-21 RX ORDER — AMOXICILLIN 250 MG
1 CAPSULE ORAL
Status: DISCONTINUED | OUTPATIENT
Start: 2020-05-21 | End: 2020-05-23 | Stop reason: HOSPADM

## 2020-05-21 RX ORDER — ONDANSETRON 4 MG/1
8 TABLET, ORALLY DISINTEGRATING ORAL EVERY 8 HOURS PRN
Status: DISCONTINUED | OUTPATIENT
Start: 2020-05-21 | End: 2020-05-21

## 2020-05-21 RX ORDER — CITALOPRAM HYDROBROMIDE 20 MG/1
20 TABLET ORAL EVERY MORNING
Status: DISCONTINUED | OUTPATIENT
Start: 2020-05-22 | End: 2020-05-23 | Stop reason: HOSPADM

## 2020-05-21 RX ORDER — PROCHLORPERAZINE MALEATE 5 MG
5 TABLET ORAL EVERY 6 HOURS PRN
Status: DISCONTINUED | OUTPATIENT
Start: 2020-05-21 | End: 2020-05-23 | Stop reason: HOSPADM

## 2020-05-21 RX ORDER — METOCLOPRAMIDE 10 MG/1
10 TABLET ORAL 4 TIMES DAILY PRN
Status: DISCONTINUED | OUTPATIENT
Start: 2020-05-21 | End: 2020-05-22

## 2020-05-21 RX ORDER — NALOXONE HYDROCHLORIDE 0.4 MG/ML
.1-.4 INJECTION, SOLUTION INTRAMUSCULAR; INTRAVENOUS; SUBCUTANEOUS
Status: DISCONTINUED | OUTPATIENT
Start: 2020-05-21 | End: 2020-05-23 | Stop reason: HOSPADM

## 2020-05-21 RX ORDER — MULTIPLE VITAMINS W/ MINERALS TAB 9MG-400MCG
1 TAB ORAL DAILY
Status: DISCONTINUED | OUTPATIENT
Start: 2020-05-21 | End: 2020-05-23 | Stop reason: HOSPADM

## 2020-05-21 RX ORDER — SIMETHICONE 80 MG
80 TABLET,CHEWABLE ORAL EVERY 6 HOURS PRN
Status: DISCONTINUED | OUTPATIENT
Start: 2020-05-21 | End: 2020-05-23 | Stop reason: HOSPADM

## 2020-05-21 RX ORDER — SODIUM CHLORIDE 9 MG/ML
1000 INJECTION, SOLUTION INTRAVENOUS CONTINUOUS
Status: DISCONTINUED | OUTPATIENT
Start: 2020-05-21 | End: 2020-05-23

## 2020-05-21 RX ORDER — PANTOPRAZOLE SODIUM 40 MG/1
40 TABLET, DELAYED RELEASE ORAL 2 TIMES DAILY
Status: DISCONTINUED | OUTPATIENT
Start: 2020-05-21 | End: 2020-05-23 | Stop reason: HOSPADM

## 2020-05-21 RX ORDER — ACETAMINOPHEN 325 MG/1
650 TABLET ORAL EVERY 4 HOURS PRN
Status: DISCONTINUED | OUTPATIENT
Start: 2020-05-21 | End: 2020-05-23 | Stop reason: HOSPADM

## 2020-05-21 RX ORDER — TRAMADOL HYDROCHLORIDE 50 MG/1
50 TABLET ORAL EVERY 6 HOURS PRN
Status: DISCONTINUED | OUTPATIENT
Start: 2020-05-21 | End: 2020-05-23 | Stop reason: HOSPADM

## 2020-05-21 RX ADMIN — SODIUM CHLORIDE, PRESERVATIVE FREE 20 ML: 5 INJECTION INTRAVENOUS at 20:08

## 2020-05-21 RX ADMIN — ACETAMINOPHEN 1000 MG: 500 TABLET, FILM COATED ORAL at 17:09

## 2020-05-21 RX ADMIN — PANTOPRAZOLE SODIUM 40 MG: 40 TABLET, DELAYED RELEASE ORAL at 18:13

## 2020-05-21 RX ADMIN — PROCHLORPERAZINE MALEATE 5 MG: 5 TABLET ORAL at 19:35

## 2020-05-21 RX ADMIN — TRAMADOL HYDROCHLORIDE 50 MG: 50 TABLET, FILM COATED ORAL at 18:13

## 2020-05-21 RX ADMIN — LORAZEPAM 0.5 MG: 0.5 TABLET ORAL at 21:19

## 2020-05-21 RX ADMIN — CEFTRIAXONE SODIUM 2 G: 2 INJECTION, POWDER, FOR SOLUTION INTRAMUSCULAR; INTRAVENOUS at 15:06

## 2020-05-21 RX ADMIN — IOPAMIDOL 62 ML: 755 INJECTION, SOLUTION INTRAVENOUS at 14:49

## 2020-05-21 RX ADMIN — SODIUM CHLORIDE, PRESERVATIVE FREE 1000 ML: 5 INJECTION INTRAVENOUS at 15:41

## 2020-05-21 RX ADMIN — ENOXAPARIN SODIUM 40 MG: 40 INJECTION SUBCUTANEOUS at 19:35

## 2020-05-21 RX ADMIN — SODIUM CHLORIDE 1000 ML: 9 INJECTION, SOLUTION INTRAVENOUS at 12:48

## 2020-05-21 RX ADMIN — MULTIPLE VITAMINS W/ MINERALS TAB 1 TABLET: TAB at 19:36

## 2020-05-21 RX ADMIN — FAMOTIDINE 20 MG: 20 TABLET, FILM COATED ORAL at 19:35

## 2020-05-21 RX ADMIN — AZITHROMYCIN 500 MG: 500 INJECTION, POWDER, LYOPHILIZED, FOR SOLUTION INTRAVENOUS at 15:44

## 2020-05-21 RX ADMIN — MORPHINE SULFATE 4 MG: 4 INJECTION INTRAVENOUS at 15:40

## 2020-05-21 RX ADMIN — HYDROMORPHONE HYDROCHLORIDE 0.5 MG: 1 INJECTION, SOLUTION INTRAMUSCULAR; INTRAVENOUS; SUBCUTANEOUS at 14:38

## 2020-05-21 ASSESSMENT — ENCOUNTER SYMPTOMS
NECK STIFFNESS: 0
SHORTNESS OF BREATH: 0
ABDOMINAL PAIN: 0
HEADACHES: 1
DIFFICULTY URINATING: 0
CONFUSION: 0
EYE REDNESS: 0
COUGH: 1
ARTHRALGIAS: 0
FEVER: 1
COLOR CHANGE: 0
MYALGIAS: 1

## 2020-05-21 ASSESSMENT — ACTIVITIES OF DAILY LIVING (ADL): ADLS_ACUITY_SCORE: 13

## 2020-05-21 ASSESSMENT — MIFFLIN-ST. JEOR
SCORE: 1472.25
SCORE: 1480.41

## 2020-05-21 NOTE — ED NOTES
[facility] [place]   ED Nurse to Floor Handoff     Nathalie Bashir is a 54 year old female who speaks English and lives with a spouse,  in a home  They arrived in the ED by car from home    ED Chief Complaint: Fever    ED Dx;   Final diagnoses:   Fever, unspecified fever cause   Pulmonary infiltrate   Person under investigation for COVID-19         Needed?: No    Allergies:   Allergies   Allergen Reactions     Gluten Meal Palpitations     Pregabalin      interfered with Cymbalta     Augmentin Rash     Acetaminophen Nausea and Other (See Comments)     Urine retention       Dust Mite Extract      Gabapentin GI Disturbance     dizziness     Methadone Fatigue     Mold      Naprosyn [Naproxen] GI Disturbance     dizziness     Oxycontin [Oxycodone]      Confusion, loopy, oxycodone is tolerated     Oxymetholone      Seasonal Allergies      Topiramate Other (See Comments)     Tongue numbness, taste alteration, irritable      Latex Rash   .  Past Medical Hx:   Past Medical History:   Diagnosis Date     Allergic rhinitis      Cancer (H)     stomach cancer     Depression      Lumbago      Migraine      Other chronic pain     low back     Sacroiliitis (H)     low back pain     Trochanteric bursitis     leg length discrrepancy, hip pain      Baseline Mental status: WDL  Current Mental Status changes: at basesline    Infection present or suspected this encounter: yes respiratory  Sepsis suspected: No  Isolation type: Special Precautions-COVID-19     Activity level - Baseline/Home:  Independent and Cane  Activity Level - Current:   Independent and Cane    Bariatric equipment needed?: No    In the ED these meds were given:   Medications   azithromycin (ZITHROMAX) 500 mg in sodium chloride 0.9 % 250 mL intermittent infusion (500 mg Intravenous New Bag 5/21/20 1548)   sodium chloride 0.9% infusion (1,000 mLs Intravenous New Bag 5/21/20 1541)   0.9% sodium chloride BOLUS (0 mLs Intravenous  Stopped 5/21/20 1400)   HYDROmorphone (PF) (DILAUDID) injection 0.5 mg (0.5 mg Intravenous Given 5/21/20 1438)   cefTRIAXone (ROCEPHIN) 2 g vial to attach to  ml bag for ADULTS or NS 50 ml bag for PEDS (0 g Intravenous Stopped 5/21/20 1545)   sodium chloride (PF) 0.9% PF flush 91 mL (91 mLs Intravenous Given 5/21/20 1449)   iopamidol (ISOVUE-370) solution 62 mL (62 mLs Intravenous Given 5/21/20 1449)   morphine (PF) injection 4 mg (4 mg Intravenous Given 5/21/20 1540)       Drips running?  Yes    Home pump  No    Current LDAs  Port A Cath Single 03/26/20 Right Chest wall (Active)   Access Date 05/21/20 05/21/20 1429   Number of days: 56       Incision/Surgical Site 01/28/20 Abdomen (Active)   Number of days: 114       Incision/Surgical Site 03/26/20 Right;Upper Chest (Active)   Number of days: 56       Labs results:   Labs Ordered and Resulted from Time of ED Arrival Up to the Time of Departure from the ED   CBC WITH PLATELETS DIFFERENTIAL - Abnormal; Notable for the following components:       Result Value    RBC Count 3.75 (*)     Hemoglobin 9.7 (*)     Hematocrit 32.2 (*)     MCH 25.9 (*)     MCHC 30.1 (*)     RDW 17.1 (*)     Absolute Monocytes 1.4 (*)     All other components within normal limits   COMPREHENSIVE METABOLIC PANEL - Abnormal; Notable for the following components:    Glucose 114 (*)     Calcium 8.2 (*)     Albumin 2.8 (*)     Protein Total 6.0 (*)     All other components within normal limits   ROUTINE UA WITH MICROSCOPIC - Abnormal; Notable for the following components:    Squamous Epithelial /HPF Urine 3 (*)     Mucous Urine Present (*)     All other components within normal limits   LIPASE - Abnormal; Notable for the following components:    Lipase 40 (*)     All other components within normal limits   LACTIC ACID WHOLE BLOOD   COVID-19 VIRUS (CORONAVIRUS) BY PCR   TROPONIN I   SARS-COV-2 (COVID-19) VIRUS RT-PCR   PERIPHERAL IV CATHETER   BLOOD CULTURE   BLOOD CULTURE       Imaging  "Studies:   Recent Results (from the past 24 hour(s))   XR Chest Port 1 View    Narrative    Portable chest    INDICATION: Evaluate for infiltrate    COMPARISON: 4/7/2020    FINDINGS: New patchy opacities in the left lung base, concerning for  pneumonia. Right IJ port catheter is present with tip in the distal  SVC. Heart size and shape appear normal. Bony structures appear  grossly intact.      Impression    IMPRESSION: New left lower lung infiltrate, concerning for pneumonia.    CODY VICTORIA MD       Recent vital signs:   /66   Pulse 111   Temp 100.2  F (37.9  C) (Oral)   Resp 24   Ht 1.753 m (5' 9\")   Wt 80.3 kg (177 lb)   LMP 09/23/2014   SpO2 94%   BMI 26.14 kg/m      [place] Coma Scale Score: 15 (05/21/20 1211)       Cardiac Rhythm: Normal Sinus and Tachycardia  Pt needs tele? Yes  Skin/wound Issues: None    Code Status: Full Code    Pain control: fair    Nausea control: good    Abnormal labs/tests/findings requiring intervention: see epic    Family present during ED course? No   Family Comments/Social Situation comments:     Tasks needing completion: None    Tameka Tafoya, RN  [phone]      "

## 2020-05-21 NOTE — ED PROVIDER NOTES
ED Provider Note  [facility]      History     Chief Complaint   Patient presents with     Fever     HPI  Nathalie Bashir is a 54 year old female with a history of gastric adenocarcinoma on chemotherapy who presents to the emergency department for evaluation of fever.  Patient states that she has felt ill for the past 2 to 3 days.  She states that she is felt fatigued and had myalgias.  Over the past several hours, patient has developed fevers.  She had a fever to 102.4 at home.  The patient otherwise denies acute symptoms.  She denies any chest pain or dyspnea.  She denies abdominal pain.  She states she was nauseous last night and did have one episode of vomiting.  She denies ongoing nausea and vomiting today.  Patient states that she did have some coughing last evening and occasional ongoing cough today.  She denies any diarrhea or constipation.  She denies any dysuria, urgency, or frequency.  On review of systems, the patient does complain of a mild frontal headache.  Patient denies any sore throat.  No rhinorrhea.  No rash or skin lesions.  Her ANC was 200 three days ago.  She has not had previous testing for COVID-19.  She does have a port in her right chest wall as well.   The patient was treated with a 5-day course of Bactrim for a UTI beginning 5/11/2019.    Past Medical History  Past Medical History:   Diagnosis Date     Allergic rhinitis      Cancer (H)     stomach cancer     Depression      Lumbago      Migraine      Other chronic pain     low back     Sacroiliitis (H)     low back pain     Trochanteric bursitis     leg length discrrepancy, hip pain     Past Surgical History:   Procedure Laterality Date     ARTHROPLASTY HIP Left 2016     BACK SURGERY      L4-5 decompression     C REPAIR DETACH RETINA,SCLERAL BUCKLE       CATARACT IOL, RT/LT       CHOLECYSTECTOMY N/A 1/28/2020    Procedure: Cholecystectomy;  Surgeon: Yandel Mallory MD;  Location: [facility] OR     ESOPHAGOSCOPY,  GASTROSCOPY, DUODENOSCOPY (EGD), COMBINED N/A 3/3/2020    Procedure: ESOPHAGOGASTRODUODENOSCOPY, WITH ENDOSCOPIC US (enoxaparin);  Surgeon: Bakari Plata MD;  Location: [facility] GI     EYE SURGERY       IR CHEST PORT PLACEMENT > 5 YRS OF AGE  3/26/2020     OPEN REDUCTION INTERNAL FIXATION RODDING INTRAMEDULLARY FEMUR Left 12/23/2014    Procedure: OPEN REDUCTION INTERNAL FIXATION RODDING INTRAMEDULLARY FEMUR;  Surgeon: Arnol Santiago MD;  Location: [facility] OR     ORTHOPEDIC SURGERY       PICC DOUBLE LUMEN PLACEMENT Right 02/07/2020    5 fr double lumen 41 cm     REMOVE HARDWARE LOWER EXTREMITY Left 4/7/2015    Procedure: REMOVE HARDWARE LOWER EXTREMITY;  Surgeon: Arnol Santiago MD;  Location: [facility] OR     REMOVE HARDWARE RODDING INTRAMEDULLARY FEMUR Left 12/17/2015    Procedure: REMOVE HARDWARE RODDING INTRAMEDULLARY FEMUR;  Surgeon: Arnol Santiago MD;  Location: [facility] OR     RESECT BONE LOWER EXTREMITY Left 12/23/2014    Procedure: RESECT BONE LOWER EXTREMITY;  Surgeon: Arnol Santiago MD;  Location: [facility] OR     WHIPPLE PROCEDURE N/A 1/28/2020    Procedure: Open Whipple procedure and Cholecystectomy;  Surgeon: Yandel Mallory MD;  Location: [facility] OR     acetaminophen (TYLENOL) 325 MG tablet  citalopram (CELEXA) 20 MG tablet  dexamethasone (DECADRON) 4 MG tablet  diclofenac (FLECTOR) 1.3 % patch  famotidine (PEPCID) 20 MG tablet  fluticasone (FLONASE) 50 MCG/ACT nasal spray  hydrOXYzine (ATARAX) 10 MG tablet  LORazepam (ATIVAN) 0.5 MG tablet  meclizine (ANTIVERT) 25 MG tablet  metoclopramide (REGLAN) 10 MG tablet  ondansetron (ZOFRAN) 8 MG tablet  pantoprazole (PROTONIX) 40 MG EC tablet  simethicone 80 MG TABS  sucralfate (CARAFATE) 1 GM/10ML suspension  SUMAtriptan (IMITREX) 100 MG tablet  traMADol (ULTRAM) 50 MG tablet  amitriptyline (ELAVIL) 25 MG tablet  calcium carb 1250 mg, 500 mg [place],/vitamin D 200 units (OSCAL WITH D) 500-200 MG-UNIT per tablet  multivitamin, therapeutic with minerals (MULTI-VITAMIN)  TABS  tiZANidine (ZANAFLEX) 4 MG tablet      Allergies   Allergen Reactions     Gluten Meal Palpitations     Pregabalin      interfered with Cymbalta     Augmentin Rash     Acetaminophen Nausea and Other (See Comments)     Urine retention       D[place] Extract      Gabapentin GI Disturbance     dizziness     Methadone Fatigue     Mold      Naprosyn [Naproxen] GI Disturbance     dizziness     Oxycontin [Oxycodone]      Confusion, loopy, oxycodone is tolerated     Oxymetholone      Seasonal Allergies      Topiramate Other (See Comments)     Tongue numbness, taste alteration, irritable      Latex Rash     Past medical history, past surgical history, medications, and allergies were reviewed with the patient. Additional pertinent items: None    Family History  Family History   Problem Relation Age of Onset     Heart Failure Mother 77     Anesthesia Reaction No family hx of      Deep Vein Thrombosis No family hx of      Family history was reviewed with the patient. Additional pertinent items: None    Social History  Social History     Tobacco Use     Smoking status: Former Smoker     Packs/day: 0.50     Years: 25.00     Pack years: 12.50     Types: Cigarettes     Last attempt to quit: 2020     Years since quittin.3     Smokeless tobacco: Never Used   Substance Use Topics     Alcohol use: No     Drug use: No      Social history was reviewed with the patient. Additional pertinent items: None    Review of Systems   Constitutional: Positive for fever.   HENT: Negative for congestion.    Eyes: Negative for redness.   Respiratory: Positive for cough. Negative for shortness of breath.    Cardiovascular: Negative for chest pain.   Gastrointestinal: Negative for abdominal pain.   Genitourinary: Negative for difficulty urinating.   Musculoskeletal: Positive for myalgias. Negative for arthralgias and neck stiffness.   Skin: Negative for color change.   Neurological: Positive for headaches.   Psychiatric/Behavioral:  "Negative for confusion.   All other systems reviewed and are negative.    A complete review of systems was performed with pertinent positives and negatives noted in the HPI, and all other systems negative.    Physical Exam   BP: 126/69  Pulse: 120  Temp: 100  F (37.8  C)  Resp: 24  Height: 175.3 cm (5' 9\")  Weight: 80.3 kg (177 lb)  SpO2: 100 %  Physical Exam  Vitals signs and nursing note reviewed.   Constitutional:       General: She is not in acute distress.     Appearance: She is not diaphoretic.   HENT:      Head: Atraumatic.   Eyes:      General: No scleral icterus.     Extraocular Movements: Extraocular movements intact.      Pupils: Pupils are equal, round, and reactive to light.   Neck:      Musculoskeletal: Normal range of motion. No neck rigidity.   Cardiovascular:      Rate and Rhythm: Normal rate and regular rhythm.   Pulmonary:      Effort: Pulmonary effort is normal. No respiratory distress.   Chest:       Abdominal:      Palpations: Abdomen is soft.      Tenderness: There is no abdominal tenderness.   Musculoskeletal: Normal range of motion.         General: No tenderness.   Skin:     General: Skin is warm and dry.      Findings: No rash.   Neurological:      General: No focal deficit present.      Cranial Nerves: No cranial nerve deficit.      Motor: No weakness.      Coordination: Coordination normal.   Psychiatric:         Mood and Affect: Mood normal.         ED Course      Procedures             EKG Interpretation:      Interpreted by MAZIN LEES MD, MD  Time reviewed: 1330  Symptoms at time of EKG: fever   Rhythm: sinus tachycardia  Rate: 114  Axis: Normal  Ectopy: none  Conduction: normal  ST Segments/ T Waves: T wave flattening Lateral and Inferior  Q Waves: none  Comparison to prior: Nonspecific T wave changes compared to previous    Clinical Impression: non-specific EKG    Results for orders placed or performed during the hospital encounter of 05/21/20   XR Chest Port 1 View     Status: " None    Narrative    Portable chest    INDICATION: Evaluate for infiltrate    COMPARISON: 4/7/2020    FINDINGS: New patchy opacities in the left lung base, concerning for  pneumonia. Right IJ port catheter is present with tip in the distal  SVC. Heart size and shape appear normal. Bony structures appear  grossly intact.      Impression    IMPRESSION: New left lower lung infiltrate, concerning for pneumonia.    CODY VICTORIA MD   CT Chest Pulmonary Embolism w Contrast     Status: None (Preliminary result)    Narrative    EXAMINATION: CTA pulmonary angiogram, 5/21/2020 3:00 PM     COMPARISON: PET/CT dated 3/13/2020 an abdominal CT dated 4/13/2020 CT  chest from 2/4/2020, chest x-ray from 5/21/2020 (same day)    HISTORY: Fever, tachycardia, pulmonary infiltrate, cancer patient on  chemotherapy. Evaluate for pulmonary embolism.    TECHNIQUE: Volumetric helical acquisition of CT images of the chest  from the lung apices to the kidneys were acquired after the  administration of 62 mL of Isovue-370 IV contrast.  Post-processed  multiplanar and/or MIP reformations were obtained, archived to PACS  and used in interpretation of this study.     FINDINGS:    Right chest wall Port-A-Cath tip terminates at the cavoatrial  junction.    Contrast bolus is: adequate.  Exam is negative for acute pulmonary  embolism.      The largest right ventricle transaxial diameter is (measured from  endocardium to endocardium): 3.4 cm   The largest left ventricle transaxial diameter is (measured from  endocardium to endocardium): 3.4 cm  RV/LV ratio is: 1.0 (if ratio greater than 1.1 then sign is suspicious  for right heart strain)  Reflux of contrast into the IVC? no  Paradoxical bowing of the interventricular septum to the left? no    Chest: Thyroid gland appears unremarkable. Tracheobronchial tree  appears patent. Esophagus appears unremarkable. No suspicious lung  nodules. New consolidative opacity of the lingula. No pleural  effusion.  The pleura appears unremarkable. No pneumothorax. Heart size  within normal limits.. No significant pericardial effusion..   Visualized thoracic aorta and main pulmonary artery diameters appear  within normal limits. Scattered atherosclerotic calcifications of the  abdominal aorta. Normal 3 vessel branching pattern of the great  vessels. There are no visualized pathologically enlarged mediastinal,  hilar or axillary lymph nodes.     Abdomen: Visualized abdomen is limited. Partially visualized  postoperative changes of Whipple procedure. A small amount of  pneumobilia is again seen.    Bones and Soft Tissues: No suspicious osseous lesion. No suspicious  mass.  Degenerative changes of the spine.        Impression    IMPRESSION:   1. No pulmonary embolism.  2. Consolidative opacity of the lingula, likely infection versus  atelectasis.  3. Small amount of pneumobilia is again seen, status post Whipple.   CBC with platelets differential     Status: Abnormal   Result Value Ref Range    WBC 7.6 4.0 - 11.0 10e9/L    RBC Count 3.75 (L) 3.8 - 5.2 10e12/L    Hemoglobin 9.7 (L) 11.7 - 15.7 g/dL    Hematocrit 32.2 (L) 35.0 - 47.0 %    MCV 86 78 - 100 fl    MCH 25.9 (L) 26.5 - 33.0 pg    MCHC 30.1 (L) 31.5 - 36.5 g/dL    RDW 17.1 (H) 10.0 - 15.0 %    Platelet Count 266 150 - 450 10e9/L    Diff Method Automated Method     % Neutrophils 66.0 %    % Lymphocytes 14.0 %    % Monocytes 18.1 %    % Eosinophils 0.1 %    % Basophils 0.5 %    % Immature Granulocytes 1.3 %    Nucleated RBCs 0 0 /100    Absolute Neutrophil 5.0 1.6 - 8.3 10e9/L    Absolute Lymphocytes 1.1 0.8 - 5.3 10e9/L    Absolute Monocytes 1.4 (H) 0.0 - 1.3 10e9/L    Absolute Eosinophils 0.0 0.0 - 0.7 10e9/L    Absolute Basophils 0.0 0.0 - 0.2 10e9/L    Abs Immature Granulocytes 0.1 0 - 0.4 10e9/L    Absolute Nucleated RBC 0.0    Comprehensive metabolic panel     Status: Abnormal   Result Value Ref Range    Sodium 136 133 - 144 mmol/L    Potassium 3.4 3.4 - 5.3 mmol/L     Chloride 104 94 - 109 mmol/L    Carbon Dioxide 25 20 - 32 mmol/L    Anion Gap 7 3 - 14 mmol/L    Glucose 114 (H) 70 - 99 mg/dL    Urea Nitrogen 13 7 - 30 mg/dL    Creatinine 0.78 0.52 - 1.04 mg/dL    GFR Estimate 87 >60 mL/min/[1.73_m2]    GFR Estimate If Black >90 >60 mL/min/[1.73_m2]    Calcium 8.2 (L) 8.5 - 10.1 mg/dL    Bilirubin Total 0.2 0.2 - 1.3 mg/dL    Albumin 2.8 (L) 3.4 - 5.0 g/dL    Protein Total 6.0 (L) 6.8 - 8.8 g/dL    Alkaline Phosphatase 141 40 - 150 U/L    ALT 36 0 - 50 U/L    AST 45 0 - 45 U/L   UA with Microscopic     Status: Abnormal   Result Value Ref Range    Color Urine Yellow     Appearance Urine Clear     Glucose Urine Negative NEG^Negative mg/dL    Bilirubin Urine Negative NEG^Negative    Ketones Urine Negative NEG^Negative mg/dL    Specific Gravity Urine 1.015 1.003 - 1.035    Blood Urine Negative NEG^Negative    pH Urine 6.0 5.0 - 7.0 pH    Protein Albumin Urine Negative NEG^Negative mg/dL    Urobilinogen mg/dL Normal 0.0 - 2.0 mg/dL    Nitrite Urine Negative NEG^Negative    Leukocyte Esterase Urine Negative NEG^Negative    Source Midstream Urine     WBC Urine 1 0 - 5 /HPF    RBC Urine 2 0 - 2 /HPF    Squamous Epithelial /HPF Urine 3 (H) 0 - 1 /HPF    Mucous Urine Present (A) NEG^Negative /LPF   Lipase     Status: Abnormal   Result Value Ref Range    Lipase 40 (L) 73 - 393 U/L   Lactic acid whole blood     Status: None   Result Value Ref Range    Lactic Acid 1.0 0.7 - 2.0 mmol/L   Symptomatic COVID-19 Virus (Coronavirus) by PCR     Status: None    Specimen: Nasopharyngeal   Result Value Ref Range    COVID-19 Virus PCR to [facility] - Source Nasopharyngeal     COVID-19 Virus PCR to [facility] - Result       Test received-See reflex to IDDL test SARS CoV2 (COVID-19) Virus RT-PCR   Troponin I     Status: None   Result Value Ref Range    Troponin I ES <0.015 0.000 - 0.045 ug/L   SARS-CoV-2 COVID-19 Virus (Coronavirus) RT-PCR Nasopharyngeal     Status: None    Specimen: Nasopharyngeal   Result  Value Ref Range    SARS-CoV-2 Virus Specimen Source Nasopharyngeal     SARS-CoV-2 PCR Result NEGATIVE     SARS-CoV-2 PCR Comment       This automated, real-time RT-PCR assay by [employer] on the [employer] has   been given Emergency Use Authorization (EUA) for the in vitro qualitative detection of RNA   from the SARS-CoV2 virus in nasopharyngeal swabs in viral transport medium from patients   with signs and symptoms of infection who are suspected of COVID-19. The performance is   unknown in asymptomatic patients.     EKG 12-lead, tracing only     Status: None   Result Value Ref Range    Interpretation ECG Click View Image link to view waveform and result    Blood culture     Status: None (Preliminary result)    Specimen: Portacath; Blood    Portacath   Result Value Ref Range    Specimen Description Blood Portacath     Culture Micro No growth after 1 hour    Blood culture     Status: None (Preliminary result)    Specimen: Arm, Right; Blood    Right Arm   Result Value Ref Range    Specimen Description Blood Right Arm     Culture Micro No growth after 1 hour            Medications   azithromycin (ZITHROMAX) 500 mg in sodium chloride 0.9 % 250 mL intermittent infusion (500 mg Intravenous New Bag 5/21/20 1544)   sodium chloride 0.9% infusion (1,000 mLs Intravenous New Bag 5/21/20 1541)   0.9% sodium chloride BOLUS (0 mLs Intravenous Stopped 5/21/20 1400)   HYDROmorphone (PF) (DILAUDID) injection 0.5 mg (0.5 mg Intravenous Given 5/21/20 1438)   cefTRIAXone (ROCEPHIN) 2 g vial to attach to  ml bag for ADULTS or NS 50 ml bag for PEDS (0 g Intravenous Stopped 5/21/20 1545)   sodium chloride (PF) 0.9% PF flush 91 mL (91 mLs Intravenous Given 5/21/20 1449)   iopamidol (ISOVUE-370) solution 62 mL (62 mLs Intravenous Given 5/21/20 1449)   morphine (PF) injection 4 mg (4 mg Intravenous Given 5/21/20 1540)     Discussed with oncology service.     Assessments & Plan (with Medical Decision Making)   54 year old  female with history of gastric adenocarcinoma on chemotherapy who was neutropenic 3 days ago to the emergency department for evaluation of fever.  She is also had some myalgias occasional cough, and frontal headache.  She is tachycardic in the emergency department but otherwise appears clinically well.  She has a low-grade fever of 100 here.  She has normal oxygen saturations.  She has an unrevealing physical examination.  The patient is not neutropenic today with a white count of 7600 and an ANC of 5000.  Her lactate level is normal.  She does have a new left lower lobe infiltrate on her chest radiograph.  She was treated initially with IV fluids.  Her tachycardia improved minimally.  She remains normotensive.  Her metabolic panel, EKG, and troponin are unrevealing.  The patient's urinalysis does not appear infected.  Blood cultures have been obtained and are currently pending.  The patient's COVID testing did result negative.  Patient was given ceftriaxone and azithromycin here in the emergency department for community-acquired pneumonia.  PE protocol chest CT also performed as patient is persistently tachycardic.  CT does not reveal evidence for pulmonary embolism but is remarkable for lingular infiltrate.  Will admit to internal medicine.    I have reviewed the nursing notes. I have reviewed the findings, diagnosis, plan and need for follow up with the patient.    New Prescriptions    No medications on file       Final diagnoses:   Fever, unspecified fever cause   Pulmonary infiltrate   Person under investigation for COVID-19       --  JIMMY LEES MD, MD   Emergency Medicine   [facility] EMERGENCY DEPARTMENT  5/21/2020     Jimmy Lees MD  05/21/20 1521       Jimmy Lees MD  05/21/20 1525       Jimmy Lees MD  05/21/20 [id number]

## 2020-05-21 NOTE — H&P
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     Plan:  #New consolidation in the lingula  #Community-acquired vs. aspiration pneumonia  Patient was neutropenic on 5/18/20 and reports feeling poorly for the past week. She had an episode of non-bilious emesis at around 530 AM on the day of admission. She felt like the acid flavor gushed down into her wind pipe and suspects that she aspirated. She took her temperature later in the morning which was 102.4 compared to 98.4 yesterday. She also noticed onset of a new cough after the event as opposed to till yesterday when she had none (no hemoptysis). She called the clinic and was directed to the ED for further care. Of note, she was found to have subtle interstitial streaking in the L midlung on CXR dated 4/7/20 in the setting of fever and cough and was prescribed a 7-day course of Levofloxacin on 4/7/20. She reminisces that she had a similar event of aspiration before isaac pneumonia at that time.   - Pt non-toxic appearing, hemodynamically stable, speaking in full sentences, and not on suppl oxygen on admission.   - CT PE done in the ED was negative for PE but demonstrated new consolidation in the lingula, consistent with CAP vs. aspiration pneumonia. This region had been unremarkable on last available cross-sectional imaging PET/CT done 3/13/20. No leukocytosis or lactic acidosis.  - Continue ceftriaxone and azithromycin started in the ED (x5/21-). Added metronidazole  for anaerobic coverage (x5/22-). Pt has reported hx of rash to Augmentin which was prescribed to her in 02/2020 following hospital discharge for E. Coli bacteremia in the setting of recent Whipple's. Pt is curently non-neutropenic with low concern for Pseudomonas.    - Ordered sputum culture/gram stain, procalcitonin, MRSA nares, RVP, and urine streptococcal/legionella antigens. SARS-CoV-2 was ruled out on admission. Follow BCx x 2 sent in the ED. UA on admn bland.   - Acapella and incentive spirometry for pulmonary toilet.   - More aggressive control of nausea/vomiting (see below)  - Elevation of the HOB at night as patient reports that her symptoms worsen with lying down/at night.   - Consulted SLP evaluation to rule out oropharyngeal dysphagia.    - Outpatient imaging to confirm interval resolution. Pt has a PET/CT scheduled for restaging on 6/3.     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 It was a pT4aN0 lesion, diffuse within the gastric wall with positive proximal gastric mucosal and serosal margins, negative other margins, positive lymphovascular as well as perineural invasion and all 22 lymph nodes were negative. She was started on neoadjuvant chemotherapy with FLOT (5FU, Oxaliplatin, Docetaxel) C1D1 3/27/20, C2D1 4/20/20, C3D1 on 5/4/20 and C4D1 scheduled on 5/27/20. C2 was delayed due to pneumonia and C4 due to neutropenia. Please refer to last clinic visit note from 5/18/20 for more details.   - Pt is currently scheduled to see Dr. Magaly Pollard in the clinic on 5/28/20 and restaging PET/CT is scheduled for 6/3/20.    #Nausea and vomiting  Pt continues to have nausea off and on in relation to her acid reflux and dyspepsia.  It can be occasionally associated with emesis. She takes ondansetron and lorazepam as needed at home with good improvement in nausea.  - Start scheduled granisetron 2 mg BID for more aggressive control of nausea/vomiting given concern for emesis-related aspiration pneumonia (see above).   - Continue lorazepam as needed.   - Continue metoclopramide PRN QID. Pt takes it for delayed gastric emptying but it does not help her much with nausea.   - Pt last saw Dr. Scott in the clinic on 4/15/20 and would benefit from continued palliative medicine care for symptom control. Consult placed.    #Gastritis  #Acid reflux  Pt had undergone EUS on 3/3/20 as part of the work-up of the gastric carcinoma with biopsy from the gastric jejunal anastomosis as well as lesser curvature demonstrating gastropathy with mild inflammation/erosion with no evidence of H. Pylori, intestinal metaplasia/dysplasia, or malignancy.   - Continue pantoprazole 40mg BID and famotidine 20mg BID.   - Change carafate to QID scheduled. Pt has been taking it on a PRN basis.  - Added Tums PRN daily.  - Pt has a dietician appointment on 6/8/20 for weight loss.      #Epigastric/LUQ pain, stable  Patient describes dominant chronic pain in the left umbilical region. She also has occasional epigastric and R umbilical pain. Likely multifactorial from Whipple's procedure vs. gastritis vs. cancer. Patient was recently seen in the ED on 4/13/20 for left-sided abdominal pain and CT A/P was negative for an acute intraabdominal pathology.   - Pt reports that the pain is stable without any changes to its character or intensity. Mild tenderness to LUQ on exam (chronic) but otherwise abdominal exam is benign. Lipase negative.  - Continue tramadol 50 mg PRN Q6H.  - Continue simethicone 80 mg PRN Q6H.   - Pt was recommended to take APAP 650 mg PRN Q6H but she has not done it as it leads to stomach upset.   - Consider low-dose gabapentin for possible postsurgical nerve  damage.  - Consulted palliative medicine as above.     #Early satiety  #Likely delayed gastric emptying  Presumably related to Whipple's procedure. Pt takes metoclopramide once daily but she does not think it has benefited her postprandial fullness much. She is trying to make her way around it instead by life style modifications such as eating small but frequent meals and is overall not very concerned about early satiety.   - Continue metoclopramide PRN QID.    #Anemia  Normocytic microchromic with elevated RDW. Ddx ACD vs. PAULINO.   - Ordered serum ferritin, iron, iron binding capacity and transferrin. Vit B12 and folate nml from 2/27/20.  - Transfuse for Hgb<7.     #Neuropathy, stable  Pt reports stable to slightly improved neuropathy in her feet. She is not on any medical therapy for it.   - Continue to monitor.       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      Patient was neutropenic on 5/18/20 and reports feeling poorly for the past week. She felt weak and was sleeping more. She woke up at 4:30 AM on the day of admission and had an episode of non-bilious emesis at around 530 AM. She felt like the acid flavor gushed down into her wind pipe and suspects that she aspirated. It was followed by another episode of emesis. She took  "her temperature later in the morning which was 102.4 compared to 98.4 yesterday. She also noticed onset of a new cough after the event as opposed to till yesterday when she had none (no hemoptysis). She called the clinic and was directed to the ED for further care. Of note, she was found to have subtle interstitial streaking in the L midlung on CXR dated 4/7/20 in the setting of fever and cough and was prescribed a 7-day course of Levofloxacin on 4/7/20. She reminisces that she had a similar event of aspiration before isaac pneumonia at that time.     She describes persistent symptoms of acid reflux, dyspepsia, and cough.  She relates off/on nausea related to this.  Occasionally but not frequently, she would have episodes of emesis like she had this morning.  She takes Zofran and lorazepam as needed for nausea with good result.  She was instructed to take metoclopramide for delayed gastric emptying which she is doing once a day in the afternoon.  She does not think it is doing any noticeable good in the way of improving her postprandial fullness and definitely does not seem to help with the nausea.    She relates almost constant chronic pain in the left umbilical region which is the dominant side of her pain.  She might also have off and on pain in the epigastrium-right umbilical region.  She takes Ultram as needed which keeps her pain overall tolerable.  No change in the character or intensity of her pain.    Review of systems is positive for seasonal allergies ongoing given spring with \"a tickle in her nose\".  But otherwise negative for headache, sinus symptoms, sore throat, sick contacts, known Covid exposures, chest pain, shortness of breath, new abdominal symptoms, dysuria, hematuria, flank pain, suprapubic pain, or diarrhea.           Review of Systems:     14-point review of systems was otherwise negative except as noted in HPI.          Past Medical History:   Past medical history was reviewed.   Past " Medical History:   Diagnosis Date     Allergic rhinitis      Cancer (H)     stomach cancer     Depression      Lumbago      Migraine      Other chronic pain     low back     Sacroiliitis (H)     low back pain     Trochanteric bursitis     leg length discrrepancy, hip pain             Past Surgical History:   Past surgical history was reviewed.   Past Surgical History:   Procedure Laterality Date     ARTHROPLASTY HIP Left 2016     BACK SURGERY      L4-5 decompression     C REPAIR DETACH RETINA,SCLERAL BUCKLE       CATARACT IOL, RT/LT       CHOLECYSTECTOMY N/A 1/28/2020    Procedure: Cholecystectomy;  Surgeon: Yandel Mallory MD;  Location:  OR     ESOPHAGOSCOPY, GASTROSCOPY, DUODENOSCOPY (EGD), COMBINED N/A 3/3/2020    Procedure: ESOPHAGOGASTRODUODENOSCOPY, WITH ENDOSCOPIC US (enoxaparin);  Surgeon: Bakari Plata MD;  [facility]: [facility]     EYE SURGERY       IR CHEST PORT PLACEMENT > 5 YRS OF AGE  3/26/2020     OPEN REDUCTION INTERNAL FIXATION RODDING INTRAMEDULLARY FEMUR Left 12/23/2014    Procedure: OPEN REDUCTION INTERNAL FIXATION RODDING INTRAMEDULLARY FEMUR;  Surgeon: Arnol Santiago MD;  Location:  OR     ORTHOPEDIC SURGERY       PICC DOUBLE LUMEN PLACEMENT Right 02/07/2020    5 fr double lumen 41 cm     REMOVE HARDWARE LOWER EXTREMITY Left 4/7/2015    Procedure: REMOVE HARDWARE LOWER EXTREMITY;  Surgeon: Arnol Santiago MD;  Location: [facility]     REMOVE HARDWARE RODDING INTRAMEDULLARY FEMUR Left 12/17/2015    Procedure: REMOVE HARDWARE RODDING INTRAMEDULLARY FEMUR;  Surgeon: Arnol Santiago MD;  [facility]     RESECT BONE LOWER EXTREMITY Left 12/23/2014    Procedure: RESECT BONE LOWER EXTREMITY;  Surgeon: Arnol Santiago MD;  Location: [facility]     WHIPPLE PROCEDURE N/A 1/28/2020    Procedure: Open Whipple procedure and Cholecystectomy;  Surgeon: Yandel Mallory MD;  Location:  OR             Social History:   Social history was reviewed.   Social History     Tobacco Use     Smoking  status: Former Smoker     Packs/day: 0.50     Years: 25.00     Pack years: 12.50     Types: Cigarettes     Last attempt to quit: 2020     Years since quittin.3     Smokeless tobacco: Never Used   Substance Use Topics     Alcohol use: No             Family History:   Family history was reviewed.  Family History   Problem Relation Age of Onset     Heart Failure Mother 77     Anesthesia Reaction No family hx of      Deep Vein Thrombosis No family hx of              Allergies:     Allergies   Allergen Reactions     Gluten Meal Palpitations     Pregabalin      interfered with Cymbalta     Augmentin Rash     Acetaminophen Nausea and Other (See Comments)     Urine retention       D[place] Extract      Gabapentin GI Disturbance     dizziness     Methadone Fatigue     Mold      Naprosyn [Naproxen] GI Disturbance     dizziness     Oxycontin [Oxycodone]      Confusion, loopy, oxycodone is tolerated     Oxymetholone      Seasonal Allergies      Topiramate Other (See Comments)     Tongue numbness, taste alteration, irritable      Latex Rash             Medications:   Medications Reviewed.   Current Facility-Administered Medications   Medication     azithromycin (ZITHROMAX) 500 mg in sodium chloride 0.9 % 250 mL intermittent infusion     sodium chloride 0.9% infusion     Current Outpatient Medications   Medication Sig     acetaminophen (TYLENOL) 325 MG tablet Take 2 tablets (650 mg) by mouth every 6 hours as needed for pain     citalopram (CELEXA) 20 MG tablet Take 20 mg by mouth every morning      dexamethasone (DECADRON) 4 MG tablet Take 1 tablet (4mg) the day before, day of, and 2 days after chemotherapy     diclofenac (FLECTOR) 1.3 % patch Place 1 patch onto the skin daily as needed      famotidine (PEPCID) 20 MG tablet Take 1 tablet (20 mg) by mouth 2 times daily     fluticasone (FLONASE) 50 MCG/ACT nasal spray Spray 2 sprays into both nostrils daily as needed      hydrOXYzine (ATARAX) 10 MG tablet Take 10 mg by  "mouth 3 times daily as needed for itching     LORazepam (ATIVAN) 0.5 MG tablet Take 1 tablet (0.5 mg) by mouth every 4 hours as needed (Anxiety, Nausea/Vomiting or Sleep)     meclizine (ANTIVERT) 25 MG tablet Take 1 tablet (25 mg) by mouth 3 times daily as needed for dizziness     metoclopramide (REGLAN) 10 MG tablet Take 1 tablet (10 mg) by mouth 4 times daily as needed (early satiety)     ondansetron (ZOFRAN) 8 MG tablet Take 1 tablet (8 mg) by mouth every 8 hours as needed (Nausea/Vomiting)     pantoprazole (PROTONIX) 40 MG EC tablet Take 1 tablet (40 mg) by mouth 2 times daily     simethicone 80 MG TABS Take 1 tablet by mouth every 6 hours as needed (bloating, gas, belching)     sucralfate (CARAFATE) 1 GM/10ML suspension Take 10 mLs (1 g) by mouth 4 times daily as needed (stomach pain)     SUMAtriptan (IMITREX) 100 MG tablet Take 100 mg by mouth as needed     traMADol (ULTRAM) 50 MG tablet Take 1 tablet (50 mg) by mouth every 6 hours as needed for severe pain     amitriptyline (ELAVIL) 25 MG tablet Take 3 tablets by mouth At Bedtime     calcium carb 1250 mg, 500 mg [place],/vitamin D 200 units (OSCAL WITH D) 500-200 MG-UNIT per tablet Take 1 tablet by mouth 3 times daily (with meals)     multivitamin, therapeutic with minerals (MULTI-VITAMIN) TABS Take 1 tablet by mouth daily     tiZANidine (ZANAFLEX) 4 MG tablet Take 1 tablet by mouth 2 times daily as needed     Facility-Administered Medications Ordered in Other Encounters   Medication     heparin 100 UNIT/ML injection 5 mL     sodium chloride (PF) 0.9% PF flush 10 mL             Physical Exam:   Vitals were reviewed.  Blood pressure 108/66, pulse 111, temperature 100.2  F (37.9  C), temperature source Oral, resp. rate 24, height 1.753 m (5' 9\"), weight 80.3 kg (177 lb), last menstrual period 09/23/2014, SpO2 94 %, not currently breastfeeding.    Constitutional: awake, laying in bed, appears comfortable, in NAD  HEENT: MMM, EOM intact, sclerae clear and " anicteric  Heart: RRR, no murmurs appreciated  Resp: Decreased breath sounds on the L base, no wheezes  Abd: Mild tenderness to left umbilical region, no rebound or guarding  MSK:  Warm, FROM, no joint swelling  Skin: no rash or lesions on limited exam  Neuro: CN2-12 grossly intact, no lateralizing symptoms or focal neurologic deficits           Data:   I/O last 3 completed shifts:  In: -   Out: 250 [Urine:250]    ROUTINE LABS (Last four results)  CMP  Recent Labs   Lab 05/21/20  1251 05/18/20  0928    139   POTASSIUM 3.4 3.4   CHLORIDE 104 106   CO2 25 24   ANIONGAP 7 9   * 97   BUN 13 11   CR 0.78 0.99   GFRESTIMATED 87 65   GFRESTBLACK >90 75   PETRA 8.2* 9.1   PROTTOTAL 6.0* 6.6*   ALBUMIN 2.8* 2.9*   BILITOTAL 0.2 0.3   ALKPHOS 141 123   AST 45 35   ALT 36 38     CBC  Recent Labs   Lab 05/21/20  1251 05/18/20  0928   WBC 7.6 3.4*   RBC 3.75* 3.82   HGB 9.7* 10.1*   HCT 32.2* 32.2*   MCV 86 84   MCH 25.9* 26.4*   MCHC 30.1* 31.4*   RDW 17.1* 16.2*    204     INRNo lab results found in last 7 days.  Arterial Blood GasNo lab results found in last 7 days.

## 2020-05-22 ENCOUNTER — APPOINTMENT (OUTPATIENT)
Dept: SPEECH THERAPY | Facility: CLINIC | Age: 55
DRG: 178 | End: 2020-05-22
Attending: INTERNAL MEDICINE
Payer: MEDICARE

## 2020-05-22 LAB
ANION GAP SERPL CALCULATED.3IONS-SCNC: 6 MMOL/L (ref 3–14)
BUN SERPL-MCNC: 8 MG/DL (ref 7–30)
C PNEUM DNA SPEC QL NAA+PROBE: NOT DETECTED
CALCIUM SERPL-MCNC: 8 MG/DL (ref 8.5–10.1)
CHLORIDE SERPL-SCNC: 111 MMOL/L (ref 94–109)
CO2 SERPL-SCNC: 25 MMOL/L (ref 20–32)
CREAT SERPL-MCNC: 0.71 MG/DL (ref 0.52–1.04)
ERYTHROCYTE [DISTWIDTH] IN BLOOD BY AUTOMATED COUNT: 17.2 % (ref 10–15)
FERRITIN SERPL-MCNC: 40 NG/ML (ref 8–252)
FLUAV H1 2009 PAND RNA SPEC QL NAA+PROBE: NOT DETECTED
FLUAV H1 RNA SPEC QL NAA+PROBE: NOT DETECTED
FLUAV H3 RNA SPEC QL NAA+PROBE: NOT DETECTED
FLUAV RNA SPEC QL NAA+PROBE: NOT DETECTED
FLUBV RNA SPEC QL NAA+PROBE: NOT DETECTED
GFR SERPL CREATININE-BSD FRML MDRD: >90 ML/MIN/{1.73_M2}
GLUCOSE SERPL-MCNC: 97 MG/DL (ref 70–99)
HADV DNA SPEC QL NAA+PROBE: NOT DETECTED
HCOV PNL SPEC NAA+PROBE: NOT DETECTED
HCT VFR BLD AUTO: 28.1 % (ref 35–47)
HGB BLD-MCNC: 8.4 G/DL (ref 11.7–15.7)
HMPV RNA SPEC QL NAA+PROBE: NOT DETECTED
HPIV1 RNA SPEC QL NAA+PROBE: NOT DETECTED
HPIV2 RNA SPEC QL NAA+PROBE: NOT DETECTED
HPIV3 RNA SPEC QL NAA+PROBE: NOT DETECTED
HPIV4 RNA SPEC QL NAA+PROBE: NOT DETECTED
IRON SATN MFR SERPL: 4 % (ref 15–46)
IRON SERPL-MCNC: 12 UG/DL (ref 35–180)
L PNEUMO1 AG UR QL IA: NORMAL
M PNEUMO DNA SPEC QL NAA+PROBE: NOT DETECTED
MCH RBC QN AUTO: 26.1 PG (ref 26.5–33)
MCHC RBC AUTO-ENTMCNC: 29.9 G/DL (ref 31.5–36.5)
MCV RBC AUTO: 87 FL (ref 78–100)
MICROBIOLOGIST REVIEW: NORMAL
PLATELET # BLD AUTO: 246 10E9/L (ref 150–450)
POTASSIUM SERPL-SCNC: 3.4 MMOL/L (ref 3.4–5.3)
RBC # BLD AUTO: 3.22 10E12/L (ref 3.8–5.2)
RSV RNA SPEC QL NAA+PROBE: NOT DETECTED
RSV RNA SPEC QL NAA+PROBE: NOT DETECTED
RV+EV RNA SPEC QL NAA+PROBE: NOT DETECTED
S PNEUM AG SPEC QL: NORMAL
SODIUM SERPL-SCNC: 142 MMOL/L (ref 133–144)
SPECIMEN SOURCE: NORMAL
SPECIMEN SOURCE: NORMAL
TIBC SERPL-MCNC: 303 UG/DL (ref 240–430)
TRANSFERRIN SERPL-MCNC: 233 MG/DL (ref 210–360)
WBC # BLD AUTO: 10.4 10E9/L (ref 4–11)

## 2020-05-22 PROCEDURE — 25800030 ZZH RX IP 258 OP 636: Performed by: INTERNAL MEDICINE

## 2020-05-22 PROCEDURE — 99233 SBSQ HOSP IP/OBS HIGH 50: CPT | Performed by: INTERNAL MEDICINE

## 2020-05-22 PROCEDURE — 25000128 H RX IP 250 OP 636: Performed by: INTERNAL MEDICINE

## 2020-05-22 PROCEDURE — 83550 IRON BINDING TEST: CPT | Performed by: INTERNAL MEDICINE

## 2020-05-22 PROCEDURE — 80048 BASIC METABOLIC PNL TOTAL CA: CPT | Performed by: INTERNAL MEDICINE

## 2020-05-22 PROCEDURE — 92526 ORAL FUNCTION THERAPY: CPT | Mod: GN

## 2020-05-22 PROCEDURE — 84466 ASSAY OF TRANSFERRIN: CPT | Performed by: INTERNAL MEDICINE

## 2020-05-22 PROCEDURE — 87899 AGENT NOS ASSAY W/OPTIC: CPT | Performed by: INTERNAL MEDICINE

## 2020-05-22 PROCEDURE — 99222 1ST HOSP IP/OBS MODERATE 55: CPT | Mod: GC | Performed by: INTERNAL MEDICINE

## 2020-05-22 PROCEDURE — 25000132 ZZH RX MED GY IP 250 OP 250 PS 637: Mod: GY | Performed by: INTERNAL MEDICINE

## 2020-05-22 PROCEDURE — 12000001 ZZH R&B MED SURG/OB UMMC

## 2020-05-22 PROCEDURE — 83540 ASSAY OF IRON: CPT | Performed by: INTERNAL MEDICINE

## 2020-05-22 PROCEDURE — 92610 EVALUATE SWALLOWING FUNCTION: CPT | Mod: GN

## 2020-05-22 PROCEDURE — 25000125 ZZHC RX 250: Performed by: INTERNAL MEDICINE

## 2020-05-22 PROCEDURE — 82728 ASSAY OF FERRITIN: CPT | Performed by: INTERNAL MEDICINE

## 2020-05-22 PROCEDURE — 85027 COMPLETE CBC AUTOMATED: CPT | Performed by: INTERNAL MEDICINE

## 2020-05-22 PROCEDURE — 36592 COLLECT BLOOD FROM PICC: CPT | Performed by: INTERNAL MEDICINE

## 2020-05-22 RX ORDER — SUMATRIPTAN 25 MG/1
25 TABLET, FILM COATED ORAL ONCE
Status: DISCONTINUED | OUTPATIENT
Start: 2020-05-22 | End: 2020-05-22

## 2020-05-22 RX ORDER — ONDANSETRON 4 MG/1
4 TABLET, FILM COATED ORAL EVERY 6 HOURS PRN
Status: DISCONTINUED | OUTPATIENT
Start: 2020-05-22 | End: 2020-05-23 | Stop reason: HOSPADM

## 2020-05-22 RX ORDER — SUMATRIPTAN 50 MG/1
50 TABLET, FILM COATED ORAL ONCE
Status: COMPLETED | OUTPATIENT
Start: 2020-05-22 | End: 2020-05-22

## 2020-05-22 RX ORDER — CLINDAMYCIN PHOSPHATE 600 MG/50ML
600 INJECTION, SOLUTION INTRAVENOUS EVERY 8 HOURS
Status: DISCONTINUED | OUTPATIENT
Start: 2020-05-22 | End: 2020-05-23

## 2020-05-22 RX ORDER — ONDANSETRON 4 MG/1
4 TABLET, ORALLY DISINTEGRATING ORAL EVERY 6 HOURS PRN
Status: DISCONTINUED | OUTPATIENT
Start: 2020-05-22 | End: 2020-05-23 | Stop reason: HOSPADM

## 2020-05-22 RX ORDER — OLANZAPINE 5 MG/1
5 TABLET, ORALLY DISINTEGRATING ORAL AT BEDTIME
Status: DISCONTINUED | OUTPATIENT
Start: 2020-05-22 | End: 2020-05-23 | Stop reason: HOSPADM

## 2020-05-22 RX ORDER — DIPHENHYDRAMINE HYDROCHLORIDE 50 MG/ML
25 INJECTION INTRAMUSCULAR; INTRAVENOUS ONCE
Status: COMPLETED | OUTPATIENT
Start: 2020-05-22 | End: 2020-05-22

## 2020-05-22 RX ADMIN — SODIUM CHLORIDE 1000 ML: 9 INJECTION, SOLUTION INTRAVENOUS at 01:13

## 2020-05-22 RX ADMIN — PROCHLORPERAZINE EDISYLATE 10 MG: 5 INJECTION INTRAMUSCULAR; INTRAVENOUS at 09:16

## 2020-05-22 RX ADMIN — TRAMADOL HYDROCHLORIDE 50 MG: 50 TABLET, FILM COATED ORAL at 13:06

## 2020-05-22 RX ADMIN — CITALOPRAM HYDROBROMIDE 20 MG: 20 TABLET ORAL at 09:26

## 2020-05-22 RX ADMIN — PANTOPRAZOLE SODIUM 40 MG: 40 TABLET, DELAYED RELEASE ORAL at 12:00

## 2020-05-22 RX ADMIN — ENOXAPARIN SODIUM 40 MG: 40 INJECTION SUBCUTANEOUS at 17:58

## 2020-05-22 RX ADMIN — TRAMADOL HYDROCHLORIDE 50 MG: 50 TABLET, FILM COATED ORAL at 19:55

## 2020-05-22 RX ADMIN — LORAZEPAM 0.5 MG: 0.5 TABLET ORAL at 12:00

## 2020-05-22 RX ADMIN — DIPHENHYDRAMINE HYDROCHLORIDE 25 MG: 50 INJECTION, SOLUTION INTRAMUSCULAR; INTRAVENOUS at 09:16

## 2020-05-22 RX ADMIN — SODIUM CHLORIDE 1000 ML: 9 INJECTION, SOLUTION INTRAVENOUS at 10:27

## 2020-05-22 RX ADMIN — OXYCODONE HYDROCHLORIDE 5 MG: 5 TABLET ORAL at 07:51

## 2020-05-22 RX ADMIN — CLINDAMYCIN IN 5 PERCENT DEXTROSE 600 MG: 12 INJECTION, SOLUTION INTRAVENOUS at 21:21

## 2020-05-22 RX ADMIN — METRONIDAZOLE 500 MG: 500 INJECTION, SOLUTION INTRAVENOUS at 02:29

## 2020-05-22 RX ADMIN — FAMOTIDINE 20 MG: 20 TABLET, FILM COATED ORAL at 19:51

## 2020-05-22 RX ADMIN — CLINDAMYCIN IN 5 PERCENT DEXTROSE 600 MG: 12 INJECTION, SOLUTION INTRAVENOUS at 13:07

## 2020-05-22 RX ADMIN — PANTOPRAZOLE SODIUM 40 MG: 40 TABLET, DELAYED RELEASE ORAL at 19:51

## 2020-05-22 RX ADMIN — LORAZEPAM 0.5 MG: 0.5 TABLET ORAL at 01:10

## 2020-05-22 RX ADMIN — OLANZAPINE 5 MG: 5 TABLET, ORALLY DISINTEGRATING ORAL at 21:22

## 2020-05-22 RX ADMIN — SODIUM CHLORIDE 1000 ML: 9 INJECTION, SOLUTION INTRAVENOUS at 21:08

## 2020-05-22 RX ADMIN — MULTIPLE VITAMINS W/ MINERALS TAB 1 TABLET: TAB at 12:00

## 2020-05-22 RX ADMIN — SUMATRIPTAN SUCCINATE 50 MG: 50 TABLET ORAL at 16:38

## 2020-05-22 RX ADMIN — SODIUM CHLORIDE 1000 ML: 9 INJECTION, SOLUTION INTRAVENOUS at 09:16

## 2020-05-22 RX ADMIN — FAMOTIDINE 20 MG: 20 TABLET, FILM COATED ORAL at 12:00

## 2020-05-22 RX ADMIN — LORAZEPAM 0.5 MG: 0.5 TABLET ORAL at 21:22

## 2020-05-22 RX ADMIN — GRANISETRON HYDROCHLORIDE 1 MG: 1 TABLET, FILM COATED ORAL at 12:00

## 2020-05-22 RX ADMIN — LORAZEPAM 0.5 MG: 0.5 TABLET ORAL at 17:58

## 2020-05-22 ASSESSMENT — MIFFLIN-ST. JEOR: SCORE: 1490.39

## 2020-05-22 ASSESSMENT — ACTIVITIES OF DAILY LIVING (ADL)
ADLS_ACUITY_SCORE: 13

## 2020-05-22 ASSESSMENT — PAIN DESCRIPTION - DESCRIPTORS
DESCRIPTORS: HEADACHE;STABBING
DESCRIPTORS: ACHING
DESCRIPTORS: ACHING

## 2020-05-22 NOTE — PROGRESS NOTES
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 Pt reports occasional reflux which she believes is why she aquired PNA. Pt denies any other s/sx of dysphagia. Clinical swallow eval completed per MD orders to further assess oropharngeal swallow function.    Clinical Swallow Evaluation   Oral Musculature generally intact   Structural Abnormalities none present   Dentition present and adequate   Mucosal Quality adequate   Mandibular Strength and Mobility intact   Oral Labial Strength and Mobility WFL   Lingual Strength and Mobility WFL   Velar Elevation intact   Buccal Strength and Mobility intact   Laryngeal Function Cough;Throat clear;Swallow;Voicing initiated   Oral Musculature Comments Baseline congested cough present   Additional Documentation Yes   Clinical Swallow Eval: Thin Liquid Texture Trial   Mode of Presentation, Thin Liquids cup;self-fed   Volume of Liquid or Food Presented 5 oz   Oral Phase of Swallow WFL   Pharyngeal Phase of Swallow intact   Diagnostic Statement No overt s/sx of aspiration    Clinical Swallow Eval: Solid Food Texture Trial   Mode of Presentation, Solid self-fed   Volume of Solid Food Presented 4  tbsp   Oral Phase, Solid WFL   Pharyngeal Phase, Solid intact   Diagnostic Statement No overt s/sx of aspiration    VFSS Evaluation   VFSS Additional Documentation No   FEES Evaluation   Additional Documentation No   Swallow Compensations   Swallow Compensations No compensations were used   Results No compensations were used   Esophageal Phase of Swallow   Patient reports or presents with symptoms of esophageal dysphagia Yes   Esophageal comments Pt reports hx of reflux; education pt on GERD precautions   General Therapy Interventions   Planned Therapy Interventions Dysphagia Treatment   Dysphagia treatment Compensatory strategies for swallowing;Instruction of safe swallow strategies   Swallow Eval: Clinical Impressions   Skilled Criteria for Therapy Intervention Current level of function same as previous level of function   Functional Assessment Scale (FAS) 6   Treatment Diagnosis Oropharyngeal swallow WFL, suspect esophageal dysphagia    Diet texture recommendations Regular diet;Thin liquids   Recommended Feeding/Eating Techniques check mouth frequently for oral residue/pocketing;alternate between small bites and sips of food/liquid;hard swallow w/ each bite or sip;maintain upright posture during/after eating for 30 mins;small sips/bites  (GERD precautions)   Demonstrates Need for Referral to Another Service dietitian;respiratory therapy;physical therapy;occupational therapy   Therapy Frequency   (x1 tx following eval)   Anticipated Discharge Disposition   (defer to medical team)   Risks and Benefits of Treatment have been explained. Yes   Patient, family and/or staff in agreement with Plan of Care Yes   Clinical Impression Comments Clinical swallow eval completed per MD orders. Pt's oropharyngeal swallow is WFL, however suspect esophageal dysphagia due to pt's hx of GERD. Pt with baseline congested cough noted. Pt tolerated thin liquids and regular solid textures with no overt s/sx of aspiration. Pt with  functional time for mastication on regular solid textures. Recommend pt continue regular textures and thin liquids. Pt should follow strict GERD precautions: avoid acid inducing food groups, consider smaller, more frequent meals, avoid PO 2 hours before bed, and remain upright for at least 30-60 minutes after a meal. Pt verbalized good understanding of strategies; no further ST needs indicated.    Total Evaluation Time   Total Evaluation Time (Minutes) 8

## 2020-05-22 NOTE — CONSULTS
[facility]  Palliative Care Consultation Note    Patient: Nathalie Bashir  Date of Admission:  5/21/2020    Requesting Clinician / Team: Hem/Onc, Dr. Jansen  Reason for consult: Pain management  Symptom management    Recommendations:  > Headache: migraine with associated photophobia +/- tension HA   Start Imitrex 50mg BID PRN; may repeat after 2 hours if needed (max 20 mg/24H)  Continue dilaudid 0.5mg IV PRN for breakthrough headache  Discontinue tylenol d/t se:abdominal pain  Oxycodone and tramadol did not help HA; okay to leave on PTA tramadol for chronic abdominal pain   Pt has been seen in pain clinic for HA; consider f/u after discharge if needed    > Nausea/vomiting:  Lifestyle modifications (avoid trigger/inciting foods)  Continue Zofran PRN  Start scheduled Zyprexa 5mg at bedtime (will also help with intermittent insomnia)  Discontinue reglan as it is not helping  Okay to leave on compazine prn for backup though she felt it didn't help     > Abdominal pain:  Lifestyle modifications (avoid trigger/inciting foods)  Okay to give PTA tramadol; if develops worsening abdominal pain, could consider increasing tramadol dose  Could give PRN dilaudid if having severe abdominal pain (not needing now)  If above adjustments don't work and there is a higher index of suspicion that neuropathic pain could be playing a role, could consider switching from Celexa to duloxetine but given good mood response to celexa would not do that now  Avoid tylenol as it worsens here abdominal pain; avoid NSAIDs with recent gastritis   Use cool compresses PRN      > Abdominal fullness/early satiety:  Discontinue Reglan as does not help nausea/and and she denies constipation/having regular BM  Start PTA simethicone 80 mg PRN Q6H for bloating and postprandial cramping     > Reflux/Gastritis:  Continue PPI BID with Pepcid BID   Discontinue Carafate QID as patient is not taking and says she cannot  tolerate as it taste like chalk and at times worsens the pain  Discontinue TUMS    > Constipation: Denies. Having regular BM. Continue Pericolace 1-2 tabs at bedtime PRN; can schedule miralax/senna if requiring frequent dilaudid.     > Anxiety:   Continue citalopram       Goals of care: Patient full code. Could follow up with more extensive goals of care discussion in clinic after restaging PET scan in june.      Thank you for the opportunity to participate in the care of this patient and family. Our team: will continue to follow.     During regular M-F work hours -- if you are not sure who specifically to contact -- please contact us by sending a text page to our team consult pager at [phone].    After regular work hours and on weekends/holidays, you can call our answering service at [phone]. Also, who's on call for us is available in [employer].     These recommendations have been discussed with patient, primary team, and my supervising attending, Dr. Taylor.       Severiano Jeffery MD  Internal Medicine-Pediatrics, PGY2  Pager: [phone]    Patient seen and evaluated with Dr. Jeffery.   Agree with assessment and recommendations.      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   Patient not on suppl O2 on admission. Started on ceftriaxone, azithromycin, and flagyl. Some concern for emesis related aspiration PNA      Today, the patient was seen for:  Symptom management, pain management, gastric adenocarcinoma     Prognosis, Goals, & Planning:      Functional Status just prior to hospitalization: 0 (Fully active, able to carry on all activities without  "restriction)    Prognosis, Goals, and/or Advance Care Planning were addressed today: No      Patient's decision making preferences: shared with support from loved ones          Patient has decision-making capacity today for complex decisions: Yes            I have concerns about the patient/family's health literacy today: No           Patient has a completed Health Care Directive: No.       Code status: full code    Coping, Meaning, & Spirituality:   Mood, coping, and/or meaning in the context of serious illness were addressed today: Yes  No  Summary/Comments: Patient says she's doing \"okay\" as it relates to the cancer.     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   Patient not on suppl O2 on admission. Started on ceftriaxone, azithromycin, and flagyl. 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 It was a pT4aN0 lesion, diffuse within the gastric wall with positive proximal gastric mucosal and serosal margins, negative other margins, positive lymphovascular as " "well as perineural invasion and all 22 lymph nodes were negative. She was started on neoadjuvant chemotherapy with FLOT (5FU, Oxaliplatin, Docetaxel) C1D1 3/27/20, C2D1 4/20/20, C3D1 on 5/4/20 and C4D1 scheduled on 5/27/20. C2 was delayed due to pneumonia and C4 due to neutropenia. Please refer to last clinic visit note from 5/18/20 for more details. Pt is currently scheduled to see Dr. Magaly Pollard in the clinic on 5/28/20 and restaging PET/CT is scheduled for 6/3/20.    Last saw Dr. Scott from Palliative care in clinic on 4/15/20.       Since admission:   Per nursing, patient has been having soft blood pressures. She's also having a non-productive cough and has been c/o headache and abdominal pain, both of which appear to be improving. She has declined pain medication for these discomforts. She is ambulating to and from bathroom independently.     Per patient this morning:   > Nausea: off and on, seems to come on after she eats  specific foods she eats (e.g., garlic); has nausea after meals and not before them. She has used zofran in the past with very good immediate effect.    > Emesis: vomited 2x in the past 2 days; usually has emesis about once per week but more frequent nausea that does not lead to emesis. She says reglan and compazine do not appear to improve the nausea. She does not think it is related to the quantity of food she eat, but it is related to the types of food she eats.   > Epigastric/LUQ pain: chronic,  Described as a \"gnawing\" pain, mainly in the upper/mid-central abdomen; always there; worsened by tylenol. Likely multifactorial from Whipple's procedure vs. gastritis vs. cancer, vs ? Postsurgical nerve damage. Patient was recently seen in the ED on 4/13/20 for left-sided abdominal pain and CT A/P was negative for an acute intraabdominal pathology.   > Early satiety: pt takes metoclopramide once daily but she does not think it has benefited her postprandial fullness much. Pt has a dietician " appointment on 6/8/20 for weight loss.   > Neuropathy: has bilaterally tingling/numbness only after she receives chemorx, denies neuropathic pain currently. Not on meds.    Key Palliative Symptom Data:  # Pain severity the last 12 hours: moderate  # Dyspnea severity the last 12 hours: none  # Nausea severity the last 12 hours: moderate  # Anxiety severity the last 12 hours: low    Patient is on opioids: assessed and bowels ok/no needed changes to plan of care today.    ROS:  Comprehensive ROS is reviewed and is negative except as per HPI.     Past Medical History:  Past Medical History:   Diagnosis Date     Allergic rhinitis      Cancer (H)     stomach cancer     Depression      Lumbago      Migraine      Other chronic pain     low back     Sacroiliitis (H)     low back pain     Trochanteric bursitis     leg length discrrepancy, hip pain        Past Surgical History:  Past Surgical History:   Procedure Laterality Date     ARTHROPLASTY HIP Left 2016     BACK SURGERY      L4-5 decompression     C REPAIR DETACH RETINA,SCLERAL BUCKLE       CATARACT IOL, RT/LT       CHOLECYSTECTOMY N/A 1/28/2020    Procedure: Cholecystectomy;  Surgeon: Yandel Mallory MD;  Location:  OR     ESOPHAGOSCOPY, GASTROSCOPY, DUODENOSCOPY (EGD), COMBINED N/A 3/3/2020    Procedure: ESOPHAGOGASTRODUODENOSCOPY, WITH ENDOSCOPIC US (enoxaparin);  Surgeon: Bakari Plata MD;  [facility]     EYE SURGERY       IR CHEST PORT PLACEMENT > 5 YRS OF AGE  3/26/2020     OPEN REDUCTION INTERNAL FIXATION RODDING INTRAMEDULLARY FEMUR Left 12/23/2014    Procedure: OPEN REDUCTION INTERNAL FIXATION RODDING INTRAMEDULLARY FEMUR;  Surgeon: Arnol Santiago MD;  Location: [facility]     ORTHOPEDIC SURGERY       PICC DOUBLE LUMEN PLACEMENT Right 02/07/2020    5 fr double lumen 41 cm     REMOVE HARDWARE LOWER EXTREMITY Left 4/7/2015    Procedure: REMOVE HARDWARE LOWER EXTREMITY;  Surgeon: Arnol Santiago MD;  Location: [facility]     REMOVE HARDWARE RODDING  INTRAMEDULLARY FEMUR Left 12/17/2015    Procedure: REMOVE HARDWARE RODDING INTRAMEDULLARY FEMUR;  Surgeon: Arnol Santiago MD;  [facility]: [facility]     RESECT BONE LOWER EXTREMITY Left 12/23/2014    Procedure: RESECT BONE LOWER EXTREMITY;  Surgeon: Arnol Santiago MD;  Location: [facility]     WHIPPLE PROCEDURE N/A 1/28/2020    Procedure: Open Whipple procedure and Cholecystectomy;  Surgeon: Yandel Mallory MD;  [facility]         Family History:  Family History   Problem Relation Age of Onset     Heart Failure Mother 77     Anesthesia Reaction No family hx of      Deep Vein Thrombosis No family hx of          Allergies:  Allergies   Allergen Reactions     Gluten Meal Palpitations     Pregabalin      interfered with Cymbalta     Augmentin Rash     Acetaminophen Nausea and Other (See Comments)     Urine retention       Dust Mite Extract      Gabapentin GI Disturbance     dizziness     Methadone Fatigue     Mold      Naprosyn [Naproxen] GI Disturbance     dizziness     Oxycontin [Oxycodone]      Confusion, loopy, oxycodone is tolerated     Oxymetholone      Seasonal Allergies      Topiramate Other (See Comments)     Tongue numbness, taste alteration, irritable      Latex Rash        Medications:  I have reviewed this patient's medication profile and medications from this hospitalization.   Noted scheduled meds are:  Granisetron (Kytryl) 1mg BID - held last night     Protonix 40mg BID  Famotidine 20mg BID  Carafate QID (taking PRN PTA) - not receiving    Celexa 20mg QAM      Noted PRN meds are:  Dilaudid 0.5mg IV - ONCE, given yesterday at 2pm   Morphine 4mg IV - ONCE, given yesterday 3pm  Oxycodone 5mg PO - ONCE, given 8am today   Compazine 10mg IV - ONCE, given today 9AM     Tramadol 50mg QID PO PRN  - given yesterday at 6pm and 1pm today   Tylenol 650mg Q4H PRN - given yesterday at 5pm    Ativan 0.5mg Q4H PRN - given 9pm, 1am, 12noon  Reglan 10mg QID PRN - has not received  Compazine 5mg PO/IV QID PRN - given  7:30pm    TUMS 500mg every day PRN    Pericolace 1-2 tabs at bedtime PRN      Physical Exam:  Vital Signs: Temp: 99.8  F (37.7  C) Temp src: Oral BP: 108/42 Pulse: 93 Heart Rate: 104 Resp: 18 SpO2: 92 % O2 Device: None (Room air)    Weight: 181 lbs 0 oz    General Appearance: awake, alert, WDWN, lying in bed, NAD  Lungs:  Normal effort  Heart: tachycardic, no LE edema   GI: soft, epigastric/central abdomen tenderness, no r/r/g  : no suprapubic or flank pain, no arora  Skin: warm, dry, no rahses  MSK: moving extremities freely, no calf tenderness  Neuro: AOx3, no focal deficits  Psych: appropriate       Data reviewed:  Recent imaging reviewed, my comments on pertinents:   CTA pulmonary angiogram, 5/21/2020 3:00 PM   1. No pulmonary embolism.  2. Consolidative opacity of the lingula, likely infection versus  atelectasis.  3. Small amount of pneumobilia is again seen, status post Whipple.     Portable CXR 5/21/20  FINDINGS: New patchy opacities in the left lung base, concerning for  pneumonia. Right IJ port catheter is present with tip in the distal  SVC. Heart size and shape appear normal. Bony structures appear  grossly intact.                                             IMPRESSION: New left lower lung infiltrate, concerning for pneumonia.    Recent lab data reviewed, my comments on pertinents:   Lab Results   Component Value Date    WBC 10.4 05/22/2020    WBC 7.6 05/21/2020    WBC 3.4 (L) 05/18/2020    HGB 8.4 (L) 05/22/2020    HGB 9.7 (L) 05/21/2020    HGB 10.1 (L) 05/18/2020    HCT 28.1 (L) 05/22/2020    HCT 32.2 (L) 05/21/2020    HCT 32.2 (L) 05/18/2020     05/22/2020     05/21/2020     05/21/2020     05/22/2020     05/21/2020     05/18/2020    POTASSIUM 3.4 05/22/2020    POTASSIUM 3.4 05/21/2020    POTASSIUM 3.4 05/18/2020    CHLORIDE 111 (H) 05/22/2020    CHLORIDE 104 05/21/2020    CHLORIDE 106 05/18/2020    CO2 25 05/22/2020    CO2 25 05/21/2020    CO2 24 05/18/2020     BUN 8 05/22/2020    BUN 13 05/21/2020    BUN 11 05/18/2020    CR 0.71 05/22/2020    CR 0.84 05/21/2020    CR 0.78 05/21/2020    GLC 97 05/22/2020     (H) 05/21/2020    GLC 97 05/18/2020    NTBNPI 412 01/30/2020    NTBNPI 247 01/28/2020    NTBNP 184 (H) 01/17/2020    TROPI <0.015 05/21/2020    TROPI <0.015 03/10/2020    TROPI <0.015 02/04/2020    AST 45 05/21/2020    AST 35 05/18/2020    AST 38 05/04/2020    ALT 36 05/21/2020    ALT 38 05/18/2020    ALT 47 05/04/2020    ALKPHOS 141 05/21/2020    ALKPHOS 123 05/18/2020    ALKPHOS 119 05/04/2020    BILITOTAL 0.2 05/21/2020    BILITOTAL 0.3 05/18/2020    BILITOTAL 0.2 05/04/2020    INR 1.04 03/26/2020    INR 1.21 (H) 02/10/2020    INR 1.27 (H) 02/08/2020

## 2020-05-22 NOTE — PLAN OF CARE
"  [id number]:  Tmax 100.1 oral.  OVSS on room air.  Infrequent congested cough, slightly productive, sputum culture still needs to be collected.  C/o migraine headache, given one time dose 5 mg oxycodone x 1, headache cocktail (10 mg IV Compazine, 25 mg IV Benadryl x 1 and 1 liter NS bolus) and 50 mg prn tramadol x 1 with some decrease in pain.  Given 0.5 mg prn PO Ativan x 1, for anxiety and intermittent nausea.  RVP negative, droplet and contact isolation discontinued.  IV azithromycin, ceftriaxone and methronidozole discontinued and started on IV clindamycin.  NS at 100 Voiding spontaneously, not saving urine.  Had a BM this AM.  Up ad nelida with cane, ambulated in room.      [id number]:  Given one time dose Imitrex for migraine headache with good results, Nathalie reported \"my head feels so much better\" and was able to eat dinner.  Given 0.5 mg prn PO Ativan for anxiety.  Continue to monitor and with plan of care.  "

## 2020-05-22 NOTE — PLAN OF CARE
Discharge Planner SLP   Patient plan for discharge: none stated   Current status: Clinical swallow eval completed per MD orders. Pt's oropharyngeal swallow is WFL, however suspect esophageal dysphagia due to pt's hx of GERD. Pt with baseline congested cough noted. Pt tolerated thin liquids and regular solid textures with no overt s/sx of aspiration. Pt with functional time for mastication on regular solid textures. Recommend pt continue regular textures and thin liquids. Pt should follow strict GERD precautions: avoid acid inducing food groups, consider smaller, more frequent meals, avoid PO 2 hours before bed, and remain upright for at least 30-60 minutes after a meal. Pt verbalized good understanding of strategies; no further ST needs indicated.   Barriers to return to prior living situation: none per ST  Recommendations for discharge: defer to medical team   Rationale for recommendations: suspect pt is at baseline oropharyngeal swallow function. Will complete ST orders.        Entered by: Crissy Stallworth 05/22/2020 2:00 PM

## 2020-05-22 NOTE — PROGRESS NOTES
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      #New consolidation in the lingula  #Community-acquired vs. aspiration pneumonia/pneumonitis  Patient was neutropenic on 5/18/20 and reports feeling poorly for the past week.   Overall history more suggestive of an acute aspiration episode/pneumonitis with high fever afterwards and now improvement, but difficult to differentiate. She does not continue to have cough or shortness of breath.  COVID negative.  CT PE negative strep pneumo and Legionella antigens negative.  RVP pending.  Started empirically on ceftriaxone, Flagyl and azithromycin. Pt is curently non-neutropenic with low concern for Pseudomonas.    -We will convert to clindamycin, IV for now given ongoing migraine and GI distress but will plan to switch to p.o. before discharge.  Discussed in depth with the patient and will plan for shortest possible course to avoid infectious complications like C. Difficile (patient unwilling to retrial Augmentin, which also caused severe diarrhea)  - Acapella and incentive spirometry for pulmonary toilet.   - More aggressive control of nausea/vomiting (see below)  - Elevation of the HOB at night as patient reports that her symptoms worsen with lying down/at night.   - Outpatient imaging to confirm interval resolution. Pt has a PET/CT scheduled for restaging on 6/3.       #Nausea and vomiting  #Gastritis  #Acid reflux  # Likely delayed gastric emptying s/p Whipple   Pt had undergone EUS on 3/3/20 as part of the work-up of the gastric carcinoma  with biopsy from the gastric jejunal anastomosis as well as lesser curvature demonstrating gastropathy with mild inflammation/erosion with no evidence of H. Pylori, intestinal metaplasia/dysplasia, or malignancy. Pt continues to have nausea off and on in relation to her acid reflux and dyspepsia. It can be occasionally associated with emesis. She takes ondansetron and lorazepam as needed at home with good improvement in nausea.  - Follow-up with palliative care outpatient, appreciate consult  - Continue pantoprazole 40mg BID and famotidine 20mg BID.   - Continue Zofran, PRN simethicone and TUMS  - Continue lorazepam as needed.   - Start olanzapine 5 mg nightly  - Discontinue metoclopramide PRN QID, stop Carafate     # Headache  Present for the past 2 days, somewhat different than her previous migraines in terms of duration, accompanied by photophobia.  Did not improve with IV bolus plus Compazine plus Benadryl, nor narcotics received in the ED last night.  Patient unwilling to try Tylenol due to GI upset.  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 It was a pT4aN0 lesion, diffuse within the gastric wall with positive proximal gastric mucosal and serosal margins, negative other margins, positive lymphovascular as well as perineural invasion and all 22 lymph nodes were negative. She was started on neoadjuvant chemotherapy with FLOT (5FU, Oxaliplatin, Docetaxel) C1D1 3/27/20, C2D1 4/20/20, C3D1 on 5/4/20 and C4D1 scheduled on 5/27/20. C2 was delayed due to pneumonia and C4 due to neutropenia. Please refer to last clinic visit note from 5/18/20 for more details.   - Pt is currently scheduled to see Dr. Magaly Pollard in the  clinic on 5/28/20 and restaging PET/CT is scheduled for 6/3/20.     #Epigastric/LUQ pain, stable  Patient describes dominant chronic pain in the left umbilical region. She also has occasional epigastric and R umbilical pain. Likely multifactorial from Whipple's procedure vs. gastritis vs. cancer. Patient was recently seen in the ED on 4/13/20 for left-sided abdominal pain and CT A/P was negative for an acute intraabdominal pathology.   - Pt reports that the pain is stable without any changes to its character or intensity. Mild tenderness to LUQ on exam (chronic) but otherwise abdominal exam is benign. Lipase negative.  - Continue tramadol 50 mg PRN Q6H.  - Continue simethicone 80 mg PRN Q6H.   - Pt was recommended to take APAP 650 mg PRN Q6H but she has not done it as it leads to stomach upset.   - Consider low-dose gabapentin for possible postsurgical nerve damage.  - Consulted palliative medicine as above.      #Anemia  Normocytic microchromic with elevated RDW. Ddx ACD vs. PAULINO. Vit B12 and folate nml from 2/27/20.  - Transfuse for Hgb<7.         Diet: Regular Diet Adult    DVT Prophylaxis: Enoxaparin (Lovenox) SQ  Hernandez Catheter: not present  Code Status: Full Code           Disposition Plan   Expected discharge: 1-2 days, recommended to prior living arrangement once adequate pain management/ tolerating PO medications.  Entered: Bree Giang MD 05/22/2020, 9:02 AM       The patient's care was discussed with the Bedside Nurse, Patient and palliative Consultant.    Bree Giang MD  Hospitalist Service, [address], [place]  Pager: [phone]  Please see sticky note for cross cover information  ______________________________________________________________________    Interval History     No acute events overnight, nursing notes reviewed.     Feeling overall a little better than at admission given lack of fever, but headache persists. Has been present x 2 days, no relief from  narcotics in the ED. Has not tried home imitrex. Nausea improved, but has not had breakfast. No cough, chest pain, or shortness of breath.     5-pt ROS otherwise negative       Data reviewed today: I reviewed all medications, new labs and imaging results over the last 24 hours.     Physical Exam   Vital Signs: Temp: 99.8  F (37.7  C) Temp src: Oral BP: 108/42 Pulse: 93 Heart Rate: 104 Resp: 18 SpO2: 92 % O2 Device: None (Room air)    Weight: 181 lbs 0 oz  Gen: alert, interactive and pleasant, lying in bed in no acute distress  HEENT: Normocephalic/atraumatic, sclera clear, oropharynx with moist mucous membranes  Resp: Clear bilaterally, easy work of breathing on room air  CV: Regular rate and rhythm, no murmurs noted   Abd: soft, tenderness in epigastrium and a little in bilateral lower quadrants, nondistended   Ext: no lower extremity edema, warm and well-perfused with brisk capillary refill   Neuro: Alert and oriented x4, grossly non-focal, moving all extremities equally     Data

## 2020-05-22 NOTE — DISCHARGE SUMMARY
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 Magaly Pollard in clinic 5/28/2020  Consult placed for symptom control visit with palliative care  Dietician appointtment scheduled for 6/8/2020 for weight loss      Unresulted Labs Ordered in the Past 30 Days of this Admission     Date and Time Order Name Status Description    5/21/2020 1815 Sputum Culture Aerobic Bacterial Preliminary     5/21/2020 1210 Blood culture Preliminary     5/21/2020 1210 Blood culture Preliminary           Discharge Disposition   Discharged to home  Condition at discharge: Stable    Hospital Course   Nathalie Bashir is a 54 year old female with PMH significant for poorly differentiated signet ring-cell gastric adenocarcinoma currently on neoadjuvant chemotherapy, gastritis, acid reflux, and early satiety who was admitted from the ED for fevers, cough, and CT-evidence of consolidation in lingula consistent with pneumonia.     #New consolidation in the lingula  #Community-aquired vs. Aspiration pneumonia  Patient was neutropenic on 5/18/2020, and she had an episode of non-bilious emesis the day of admission and felt like she aspirated some of it. She developed a fever of 102.4 later that morning, called the clinic, and was referred to the ED for further care on the day of admission. She also noticed a new onset cough without hemoptysis after the event.  The patient was stable upon arrival to the hospital, and a CT PE study done in the ED was negative for PE but demonstrated a new consolidation in the lingula, consistent with CAP vs aspiration pneumonia. She had no hypoxia, leukocytosis or lactic acidosis and was not neutropenic at the time of admission. RVP and COVID testing was negative, as were S penumo and legionella..  She was started on Ceftriaxone, azithromycin and metronidazole but given her history suspicious for aspiration pneumonitis this was converted to clindamycin.  Discussed in depth with the patient and will plan for shortest possible course to avoid infectious complications like C. Difficile (patient unwilling to retrial Augmentin, which also caused severe diarrhea). She will have other 5 days abx to complete 7 days total.   - Outpatient imaging to confirm interval resolution. Pt has a PET/CT scheduled for restaging on 6/3.     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 She has been on neoadjuvant chemotherapy. Please refer to last clinic visit note from 5/18/20 for more details.  -Patient was not neutropenic at the time of admission  -She is scheduled to see Dr. Magaly Pollard in clinic on 5/28/20 and a restaging PET/CT is scheduled from 6/3/20.     #Nausea and vomiting  #Gastritis  #Acid reflux  # Likely delayed gastric emptying s/p Whipple   Pt had undergone EUS on 3/3/20 as part of the work-up of the gastric carcinoma with biopsy from the gastric jejunal anastomosis as well as lesser curvature demonstrating gastropathy with mild inflammation/erosion with no evidence of H. Pylori, intestinal metaplasia/dysplasia, or malignancy. Pt continues to have nausea off and on in relation to her  acid reflux and dyspepsia.   Palliative care consulted inpatient.   Her medications were adjusted with improvement in her nausea, and she will discharge on PTA pantoprazole 40mg BID, famotidine 20mg BID, Zofran, olanzapine 5 mg HS, and PRN simethicone, lorazepam, and TUMS. Discontinue metoclopramide PRN QID and Carafate.      # Migraine Headache  Present for the past 2 days, somewhat different than her previous migraines in terms of duration, accompanied by photophobia.  Did not improve with IV bolus plus Compazine plus Benadryl, nor narcotics received in the ED last night.  Patient unwilling to try Tylenol due to GI upset. Improved with Imitrex 50 mg x2 doses.     Consultations This Hospital Stay   PALLIATIVE CARE ADULT IP CONSULT  SPEECH LANGUAGE PATH ADULT IP CONSULT    Code Status   Full Code    Time Spent on this Encounter   I, Bree Giang MD, personally saw the patient today and spent greater than 30 minutes discharging this patient.       Bree Giang MD,  [facility], [place]  ______________________________________________________________________    Physical Exam   Vital Signs: Temp: 99.4  F (37.4  C) Temp src: Oral BP: 107/62 Pulse: 78 Heart Rate: 91 Resp: 20 SpO2: 97 % O2 Device: None (Room air)    Weight: 184 lbs 9.6 oz  Gen: alert, interactive and pleasant, lying in bed in no acute distress  HEENT: Normocephalic/atraumatic, sclera clear, oropharynx with moist mucous membranes  Resp: Clear bilaterally, easy work of breathing on room air  CV: Regular rate and rhythm, no murmurs noted   Abd: soft, tenderness in epigastrium a little improved, nondistended   Ext: no lower extremity edema, warm and well-perfused with brisk capillary refill   Neuro: Alert and oriented x4, grossly non-focal, moving all extremities equally           Primary Care Physician   Marianne Gonzalez    Discharge Orders      Reason for your hospital stay    Dear Nathalie Bashir    You were hospitalized at  [facility] with vomiting and fever, suspected to have developed a pneumonia vs chemical lung irritation after aspiration. You were started on antibiotics, treated for migraine headache, and adjustments were made to your home medication regimen to better prevent vomiting. Over this hospitalization your symptoms improved and today you are ready to be discharged home.      We are suggesting several medication changes, so please review your medication list carefully. The biggest changes include:   - clindamycin for pneumonia for another 5 days. If you have recurrent fever while on this antibiotic you should discuss with your doctors or return to the ED.   - started olanzapine at bedtime for nausea   - stopped the reglan and carafate     Please keep your established follow-up appointments with palliative care and your oncology team (including imaging).     Call the [facility] cancer clinic triage line at [phone] for temperature greater than or equal to 100.4, uncontrolled nausea/vomiting, diarrhea or constipation, unrelieved pain, bleeding not relieved with pressure, dizziness, chest pain, shortness of breath, loss of consciousness, and any new or concerning symptoms.  Your hospital unit at the time of discharge is 7D so if you have questions specifically about your discharge please call the general hospital line at [phone] and ask to talk to a nurse on 7D.    It was a pleasure meeting with you today. Thank you for allowing me and my team the privilege of caring for you.   Take care!  Bree Giang MD  Department of Medicine  [facility]     Adult [facility] Follow-up and recommended labs and tests    Follow up with oncology and palliative care as established.     Appointments on [place] and/or [facility] (with [facility] or [facility] provider or service). Call [phone] if you haven't heard regarding these appointments within 7 days of discharge.     Activity    Your  activity upon discharge: activity as tolerated a     Diet    Follow this diet upon discharge: resume your prior diet, with the modifications we discussed       Significant Results and Procedures   Results for orders placed or performed during the hospital encounter of 05/21/20   XR Chest Port 1 View    Narrative    Portable chest    INDICATION: Evaluate for infiltrate    COMPARISON: 4/7/2020    FINDINGS: New patchy opacities in the left lung base, concerning for  pneumonia. Right IJ port catheter is present with tip in the distal  SVC. Heart size and shape appear normal. Bony structures appear  grossly intact.      Impression    IMPRESSION: New left lower lung infiltrate, concerning for pneumonia.    CODY VICTORIA MD   CT Chest Pulmonary Embolism w Contrast    Narrative    EXAMINATION: CTA pulmonary angiogram, 5/21/2020 3:00 PM     COMPARISON: PET/CT dated 3/13/2020 an abdominal CT dated 4/13/2020 CT  chest from 2/4/2020, chest x-ray from 5/21/2020 (same day)    HISTORY: Fever, tachycardia, pulmonary infiltrate, cancer patient on  chemotherapy. Evaluate for pulmonary embolism.    TECHNIQUE: Volumetric helical acquisition of CT images of the chest  from the lung apices to the kidneys were acquired after the  administration of 62 mL of Isovue-370 IV contrast.  Post-processed  multiplanar and/or MIP reformations were obtained, archived to PACS  and used in interpretation of this study.     FINDINGS:    Right chest wall Port-A-Cath tip terminates at the cavoatrial  junction.    Contrast bolus is: adequate.  Exam is negative for acute pulmonary  embolism.      The largest right ventricle transaxial diameter is (measured from  endocardium to endocardium): 3.4 cm   The largest left ventricle transaxial diameter is (measured from  endocardium to endocardium): 3.4 cm  RV/LV ratio is: 1.0 (if ratio greater than 1.1 then sign is suspicious  for right heart strain)  Reflux of contrast into the IVC? no  Paradoxical bowing  of the interventricular septum to the left? no    Chest: Thyroid gland appears unremarkable. Tracheobronchial tree  appears patent. Esophagus appears unremarkable. No suspicious lung  nodules. New consolidative opacity of the lingula. No pleural  effusion. The pleura appears unremarkable. No pneumothorax. Heart size  within normal limits.. No significant pericardial effusion..   Visualized thoracic aorta and main pulmonary artery diameters appear  within normal limits. Scattered atherosclerotic calcifications of the  abdominal aorta. Normal 3 vessel branching pattern of the great  vessels. There are no visualized pathologically enlarged mediastinal,  hilar or axillary lymph nodes.     Abdomen: Visualized abdomen is limited. Partially visualized  postoperative changes of Whipple procedure. A small amount of  pneumobilia is again seen.    Bones and Soft Tissues: No suspicious osseous lesion. No suspicious  mass.  Degenerative changes of the spine.        Impression    IMPRESSION:   1. No pulmonary embolism.  2. Consolidative opacity of the lingula, likely infection versus  atelectasis.  3. Small amount of pneumobilia is again seen, status post Whipple.    I have personally reviewed the examination and initial interpretation  and I agree with the findings.    WILLY YEN MD       Discharge Medications   Current Discharge Medication List      START taking these medications    Details   clindamycin (CLEOCIN) 300 MG capsule Take 2 capsules (600 mg) by mouth every 8 hours for 5 days  Qty: 30 capsule, Refills: 0    Associated Diagnoses: Pneumonia of left upper lobe due to infectious organism (H)      OLANZapine zydis (ZYPREXA) 5 MG ODT Take 1 tablet (5 mg) by mouth At Bedtime  Qty: 30 tablet, Refills: 0    Associated Diagnoses: Gastric adenocarcinoma (H)      ondansetron (ZOFRAN-ODT) 4 MG ODT tab Take 1 tablet (4 mg) by mouth every 6 hours as needed for nausea or vomiting  Qty: 30 tablet, Refills: 0    Associated  Diagnoses: Gastric adenocarcinoma (H)         CONTINUE these medications which have CHANGED    Details   meclizine (ANTIVERT) 25 MG tablet Take 1 tablet (25 mg) by mouth 3 times daily as needed for dizziness  Qty: 30 tablet, Refills: 0      senna-docusate (SENOKOT-S/PERICOLACE) 8.6-50 MG tablet Take 1 tablet by mouth daily as needed for constipation  Qty: 30 tablet, Refills: 0    Associated Diagnoses: Continuous opioid dependence (H)         CONTINUE these medications which have NOT CHANGED    Details   acetaminophen (TYLENOL) 325 MG tablet Take 2 tablets (650 mg) by mouth every 6 hours as needed for pain  Qty: 120 tablet, Refills: 3    Associated Diagnoses: RUQ abdominal pain; Other acute gastritis without hemorrhage      citalopram (CELEXA) 20 MG tablet Take 20 mg by mouth every morning       dexamethasone (DECADRON) 4 MG tablet Take 1 tablet (4mg) the day before, day of, and 2 days after chemotherapy  Qty: 12 tablet, Refills: 3    Associated Diagnoses: Gastric adenocarcinoma (H)      diclofenac (FLECTOR) 1.3 % patch Place 1 patch onto the skin daily as needed       famotidine (PEPCID) 20 MG tablet Take 1 tablet (20 mg) by mouth 2 times daily  Qty: 60 tablet, Refills: 3    Associated Diagnoses: Other gastritis without hemorrhage, unspecified chronicity      fluticasone (FLONASE) 50 MCG/ACT nasal spray Spray 2 sprays into both nostrils daily as needed       hydrOXYzine (ATARAX) 10 MG tablet Take 10 mg by mouth 3 times daily as needed for itching      LORazepam (ATIVAN) 0.5 MG tablet Take 1 tablet (0.5 mg) by mouth every 4 hours as needed (Anxiety, Nausea/Vomiting or Sleep)  Qty: 30 tablet, Refills: 5    Associated Diagnoses: Gastric adenocarcinoma (H)      ondansetron (ZOFRAN) 8 MG tablet Take 1 tablet (8 mg) by mouth every 8 hours as needed (Nausea/Vomiting)  Qty: 10 tablet, Refills: 7    Associated Diagnoses: Gastric adenocarcinoma (H)      pantoprazole (PROTONIX) 40 MG EC tablet Take 1 tablet (40 mg) by mouth 2  times daily  Qty: 60 tablet, Refills: 1    Associated Diagnoses: IPMN (intraductal papillary mucinous neoplasm); Malignant neoplasm of pyloric antrum (H); Abdominal pain, epigastric      simethicone 80 MG TABS Take 1 tablet by mouth every 6 hours as needed (bloating, gas, belching)  Qty: 90 tablet, Refills: 3    Associated Diagnoses: Other acute gastritis without hemorrhage; Bloating      SUMAtriptan (IMITREX) 100 MG tablet Take 100 mg by mouth as needed      traMADol (ULTRAM) 50 MG tablet Take 1 tablet (50 mg) by mouth every 6 hours as needed for severe pain  Qty: 60 tablet, Refills: 0    Associated Diagnoses: RUQ abdominal pain; Gastric adenocarcinoma (H)      amitriptyline (ELAVIL) 25 MG tablet Take 3 tablets by mouth At Bedtime      calcium carb 1250 mg, 500 mg [place],/vitamin D 200 units (OSCAL WITH D) 500-200 MG-UNIT per tablet Take 1 tablet by mouth 3 times daily (with meals)      multivitamin, therapeutic with minerals (MULTI-VITAMIN) TABS Take 1 tablet by mouth daily      tiZANidine (ZANAFLEX) 4 MG tablet Take 1 tablet by mouth 2 times daily as needed         STOP taking these medications       metoclopramide (REGLAN) 10 MG tablet Comments:   Reason for Stopping:         sucralfate (CARAFATE) 1 GM/10ML suspension Comments:   Reason for Stopping:             Allergies   Allergies   Allergen Reactions     Gluten Meal Palpitations     Pregabalin      interfered with Cymbalta     Augmentin Rash     Acetaminophen Nausea and Other (See Comments)     Urine retention       [place] Extract      Gabapentin GI Disturbance     dizziness     Methadone Fatigue     Mold      Naprosyn [Naproxen] GI Disturbance     dizziness     Oxycontin [Oxycodone]      Confusion, loopy, oxycodone is tolerated     Oxymetholone      Seasonal Allergies      Topiramate Other (See Comments)     Tongue numbness, taste alteration, irritable      Latex Rash

## 2020-05-22 NOTE — PLAN OF CARE
Alert and oriented X 4. Soft BPs. OVSS. Denies SOB, nausea or vomiting. Infrequent congested, non-productive cough noted. C/o headache and abdominal pain which are improving. Declines pain medication for these discomforts. Ambulating to and from bathroom independently. Sleeping intermittently.

## 2020-05-22 NOTE — PLAN OF CARE
Nursing Focus: Admission  D: Arrived at approx 1900 from ED via car ride. Patient alone. Admitted for home fevers. Complains of headache.      I: Admission process began.  Patient oriented to room, enviroment, call light.  Md. notified of patients arrival on unit.     A: Hypotensive but other vital signs stable, afebrile.  Patient stable at this time.  Complaining of headache.     P: Implement plan of care when available. Continue to monitor patient. Nursing interventions as appropriate. Notify md with changes in pt status.    One time dose of oxy ordered, although Nathalie said she would try to sleep for now and perhaps take it late.  PRN ativan given x1.  Port accessed and infusing NS at 100.  A/Ox4.  BP's soft, MD notified.  Pt denies dizziness/n/v.

## 2020-05-23 ENCOUNTER — HOME INFUSION (PRE-WILLOW HOME INFUSION) (OUTPATIENT)
Dept: PHARMACY | Facility: CLINIC | Age: 55
End: 2020-05-23

## 2020-05-23 VITALS
DIASTOLIC BLOOD PRESSURE: 62 MMHG | WEIGHT: 184.6 LBS | SYSTOLIC BLOOD PRESSURE: 107 MMHG | RESPIRATION RATE: 20 BRPM | TEMPERATURE: 99.4 F | OXYGEN SATURATION: 97 % | HEART RATE: 78 BPM | HEIGHT: 69 IN | BODY MASS INDEX: 27.34 KG/M2

## 2020-05-23 LAB
ANION GAP SERPL CALCULATED.3IONS-SCNC: 2 MMOL/L (ref 3–14)
BUN SERPL-MCNC: 4 MG/DL (ref 7–30)
CALCIUM SERPL-MCNC: 7.8 MG/DL (ref 8.5–10.1)
CHLORIDE SERPL-SCNC: 113 MMOL/L (ref 94–109)
CO2 SERPL-SCNC: 26 MMOL/L (ref 20–32)
CREAT SERPL-MCNC: 0.82 MG/DL (ref 0.52–1.04)
ERYTHROCYTE [DISTWIDTH] IN BLOOD BY AUTOMATED COUNT: 17.6 % (ref 10–15)
GFR SERPL CREATININE-BSD FRML MDRD: 82 ML/MIN/{1.73_M2}
GLUCOSE SERPL-MCNC: 91 MG/DL (ref 70–99)
GRAM STN SPEC: NORMAL
HCT VFR BLD AUTO: 28.1 % (ref 35–47)
HGB BLD-MCNC: 8.4 G/DL (ref 11.7–15.7)
Lab: NORMAL
MCH RBC QN AUTO: 26.3 PG (ref 26.5–33)
MCHC RBC AUTO-ENTMCNC: 29.9 G/DL (ref 31.5–36.5)
MCV RBC AUTO: 88 FL (ref 78–100)
PLATELET # BLD AUTO: 231 10E9/L (ref 150–450)
POTASSIUM SERPL-SCNC: 3.1 MMOL/L (ref 3.4–5.3)
POTASSIUM SERPL-SCNC: 3.4 MMOL/L (ref 3.4–5.3)
RBC # BLD AUTO: 3.19 10E12/L (ref 3.8–5.2)
SODIUM SERPL-SCNC: 141 MMOL/L (ref 133–144)
SPECIMEN SOURCE: NORMAL
WBC # BLD AUTO: 9.7 10E9/L (ref 4–11)

## 2020-05-23 PROCEDURE — 25000128 H RX IP 250 OP 636: Performed by: INTERNAL MEDICINE

## 2020-05-23 PROCEDURE — 36592 COLLECT BLOOD FROM PICC: CPT | Performed by: INTERNAL MEDICINE

## 2020-05-23 PROCEDURE — 25000132 ZZH RX MED GY IP 250 OP 250 PS 637: Mod: GY | Performed by: INTERNAL MEDICINE

## 2020-05-23 PROCEDURE — 85027 COMPLETE CBC AUTOMATED: CPT | Performed by: INTERNAL MEDICINE

## 2020-05-23 PROCEDURE — 99239 HOSP IP/OBS DSCHRG MGMT >30: CPT | Performed by: INTERNAL MEDICINE

## 2020-05-23 PROCEDURE — 87205 SMEAR GRAM STAIN: CPT | Performed by: INTERNAL MEDICINE

## 2020-05-23 PROCEDURE — 84132 ASSAY OF SERUM POTASSIUM: CPT | Performed by: INTERNAL MEDICINE

## 2020-05-23 PROCEDURE — 87070 CULTURE OTHR SPECIMN AEROBIC: CPT | Performed by: INTERNAL MEDICINE

## 2020-05-23 PROCEDURE — 80048 BASIC METABOLIC PNL TOTAL CA: CPT | Performed by: INTERNAL MEDICINE

## 2020-05-23 PROCEDURE — 25000125 ZZHC RX 250: Performed by: INTERNAL MEDICINE

## 2020-05-23 RX ORDER — BENZONATATE 100 MG/1
100 CAPSULE ORAL 3 TIMES DAILY PRN
Status: DISCONTINUED | OUTPATIENT
Start: 2020-05-23 | End: 2020-05-23 | Stop reason: HOSPADM

## 2020-05-23 RX ORDER — MECLIZINE HYDROCHLORIDE 25 MG/1
25 TABLET ORAL 3 TIMES DAILY PRN
Qty: 30 TABLET | Refills: 0 | COMMUNITY
Start: 2020-05-23 | End: 2020-06-25

## 2020-05-23 RX ORDER — HEPARIN SODIUM,PORCINE 10 UNIT/ML
5-10 VIAL (ML) INTRAVENOUS EVERY 24 HOURS
Status: DISCONTINUED | OUTPATIENT
Start: 2020-05-23 | End: 2020-05-23 | Stop reason: HOSPADM

## 2020-05-23 RX ORDER — ONDANSETRON 4 MG/1
4 TABLET, ORALLY DISINTEGRATING ORAL EVERY 6 HOURS PRN
Qty: 30 TABLET | Refills: 0 | Status: SHIPPED | OUTPATIENT
Start: 2020-05-23 | End: 2020-05-27

## 2020-05-23 RX ORDER — SUMATRIPTAN 50 MG/1
50 TABLET, FILM COATED ORAL ONCE
Status: COMPLETED | OUTPATIENT
Start: 2020-05-23 | End: 2020-05-23

## 2020-05-23 RX ORDER — OLANZAPINE 5 MG/1
5 TABLET, ORALLY DISINTEGRATING ORAL AT BEDTIME
Qty: 30 TABLET | Refills: 0 | Status: SHIPPED | OUTPATIENT
Start: 2020-05-23 | End: 2020-07-20

## 2020-05-23 RX ORDER — CLINDAMYCIN HCL 300 MG
600 CAPSULE ORAL EVERY 8 HOURS SCHEDULED
Status: DISCONTINUED | OUTPATIENT
Start: 2020-05-23 | End: 2020-05-23 | Stop reason: HOSPADM

## 2020-05-23 RX ORDER — HEPARIN SODIUM,PORCINE 10 UNIT/ML
5-10 VIAL (ML) INTRAVENOUS
Status: DISCONTINUED | OUTPATIENT
Start: 2020-05-23 | End: 2020-05-23 | Stop reason: HOSPADM

## 2020-05-23 RX ORDER — CLINDAMYCIN HCL 300 MG
600 CAPSULE ORAL EVERY 8 HOURS
Qty: 30 CAPSULE | Refills: 0 | Status: SHIPPED | OUTPATIENT
Start: 2020-05-23 | End: 2020-05-27

## 2020-05-23 RX ORDER — LIDOCAINE 40 MG/G
CREAM TOPICAL
Status: DISCONTINUED | OUTPATIENT
Start: 2020-05-23 | End: 2020-05-23 | Stop reason: HOSPADM

## 2020-05-23 RX ORDER — AMOXICILLIN 250 MG
1 CAPSULE ORAL DAILY PRN
Qty: 30 TABLET | Refills: 0 | Status: SHIPPED | OUTPATIENT
Start: 2020-05-23 | End: 2020-07-20

## 2020-05-23 RX ORDER — HEPARIN SODIUM (PORCINE) LOCK FLUSH IV SOLN 100 UNIT/ML 100 UNIT/ML
5 SOLUTION INTRAVENOUS
Status: DISCONTINUED | OUTPATIENT
Start: 2020-05-23 | End: 2020-05-23 | Stop reason: HOSPADM

## 2020-05-23 RX ADMIN — Medication 5 ML: at 15:06

## 2020-05-23 RX ADMIN — SUMATRIPTAN SUCCINATE 50 MG: 50 TABLET ORAL at 11:25

## 2020-05-23 RX ADMIN — PANTOPRAZOLE SODIUM 40 MG: 40 TABLET, DELAYED RELEASE ORAL at 08:44

## 2020-05-23 RX ADMIN — CLINDAMYCIN IN 5 PERCENT DEXTROSE 600 MG: 12 INJECTION, SOLUTION INTRAVENOUS at 05:06

## 2020-05-23 RX ADMIN — POTASSIUM CHLORIDE 20 MEQ: 29.8 INJECTION, SOLUTION INTRAVENOUS at 10:22

## 2020-05-23 RX ADMIN — CITALOPRAM HYDROBROMIDE 20 MG: 20 TABLET ORAL at 08:44

## 2020-05-23 RX ADMIN — BENZONATATE 100 MG: 100 CAPSULE ORAL at 14:14

## 2020-05-23 RX ADMIN — MULTIPLE VITAMINS W/ MINERALS TAB 1 TABLET: TAB at 08:44

## 2020-05-23 RX ADMIN — FAMOTIDINE 20 MG: 20 TABLET, FILM COATED ORAL at 08:44

## 2020-05-23 RX ADMIN — LORAZEPAM 0.5 MG: 0.5 TABLET ORAL at 10:21

## 2020-05-23 RX ADMIN — CLINDAMYCIN HYDROCHLORIDE 600 MG: 300 CAPSULE ORAL at 14:20

## 2020-05-23 RX ADMIN — POTASSIUM CHLORIDE 20 MEQ: 29.8 INJECTION, SOLUTION INTRAVENOUS at 08:44

## 2020-05-23 RX ADMIN — TRAMADOL HYDROCHLORIDE 50 MG: 50 TABLET, FILM COATED ORAL at 12:11

## 2020-05-23 RX ADMIN — LORAZEPAM 0.5 MG: 0.5 TABLET ORAL at 01:16

## 2020-05-23 ASSESSMENT — ACTIVITIES OF DAILY LIVING (ADL)
ADLS_ACUITY_SCORE: 13

## 2020-05-23 ASSESSMENT — MIFFLIN-ST. JEOR: SCORE: 1506.72

## 2020-05-23 NOTE — PLAN OF CARE
Pt. Tmax 100.3 OVSS on room air. A&Ox4 denies nausea, sob. Pain improving. Gave ativan x2 for anxiety overnight. Up independently. Voiding spontaneously. Sputum sample collected and sent. Slept alright overnight. Will continue with poc.

## 2020-05-23 NOTE — PLAN OF CARE
0582-3590:  Afebrile.  VSS on room air.  C/o migraine headache, given one-time dose Imitrex and prn tramadol with a decrease in pain.  Potassium 3.1, replaced and recheck K+ level 3.4.  Given prn Ativan for anxiety.  Infrequent, slight productive cough, given prn Tessalon capsule x 1.  IV ABXs changed to PO.  MIVF discontinued.  Good PO intake.  Voiding spontaneously, not saving urine.  Loose BM x 1, r/t ABX.  Port a cath needle removed, heparin locked, blood return noted. Up ad nelida with cane.           Discharge    D: Orders for discharge and outpatient medications written.    I: Home medications and return to clinic schedule reviewed with patient. Discharge instructions and parameters for calling Health Care Provider reviewed:  Call the [facility] cancer clinic triage line at [phone] for temperature greater than or equal to 100.4, uncontrolled nausea/vomiting, diarrhea or constipation, unrelieved pain, bleeding not relieved with pressure, dizziness, chest pain, shortness of breath, loss of consciousness, and any new or concerning symptoms. Patient left at 1620 accompanied by 7D NST to discharge pharmacy and then to the front where patient was picked up by her .     A: Patient/family verbalized understanding and was ready for discharge.     P: Patient instructed to  medications in Pharmacy. Follow up as scheduled May 26, 2020 at 6:30 AM in the [facility].

## 2020-05-24 ENCOUNTER — PATIENT OUTREACH (OUTPATIENT)
Dept: CARE COORDINATION | Facility: CLINIC | Age: 55
End: 2020-05-24

## 2020-05-25 LAB
BACTERIA SPEC CULT: NORMAL
SPECIMEN SOURCE: NORMAL

## 2020-05-26 NOTE — PROGRESS NOTES
[facility]: Post-Discharge Note  SITUATION                                                      Admission:    Admission Date: 05/21/20   Reason for Admission: Community acquired vs aspiration pneumonia  Discharge:   Discharge Date: 05/23/20  Discharge Diagnosis: Community acquired vs aspiration pneumonia  Discharge Service: Hospitalist    BACKGROUND                                                      Nathalie Bashir is a 54 year old female with PMH significant for poorly differentiated signet ring-cell gastric adenocarcinoma currently on neoadjuvant chemotherapy, gastritis, acid reflux, early satiety who was admitted from the ED for fevers, cough and CT-evidence of consolidation in lingula consistent with pneumonia.       Patient was neutropenic on 5/18/20 and reports feeling poorly for the past week. She felt weak and was sleeping more. She woke up at 4:30 AM on the day of admission and had an episode of non-bilious emesis at around 530 AM. She felt like the acid flavor gushed down into her wind pipe and suspects that she aspirated. It was followed by another episode of emesis. She took her temperature later in the morning which was 102.4 compared to 98.4 yesterday. She also noticed onset of a new cough after the event as opposed to till yesterday when she had none (no hemoptysis). She called the clinic and was directed to the ED for further care. Of note, she was found to have subtle interstitial streaking in the L midlung on CXR dated 4/7/20 in the setting of fever and cough and was prescribed a 7-day course of Levofloxacin on 4/7/20. She reminisces that she had a similar event of aspiration before isaac pneumonia at that time.      She describes persistent symptoms of acid reflux, dyspepsia, and cough.  She relates off/on nausea related to this.  Occasionally but not frequently, she would have episodes of emesis like she had this morning.  She takes Zofran and lorazepam as needed for  "nausea with good result.  She was instructed to take metoclopramide for delayed gastric emptying which she is doing once a day in the afternoon.  She does not think it is doing any noticeable good in the way of improving her postprandial fullness and definitely does not seem to help with the nausea.     She relates almost constant chronic pain in the left umbilical region which is the dominant side of her pain.  She might also have off and on pain in the epigastrium-right umbilical region.  She takes Ultram as needed which keeps her pain overall tolerable.  No change in the character or intensity of her pain.     Review of systems is positive for seasonal allergies ongoing given spring with \"a tickle in her nose\".  But otherwise negative for headache, sinus symptoms, sore throat, sick contacts, known Covid exposures, chest pain, shortness of breath, new abdominal symptoms, dysuria, hematuria, flank pain, suprapubic pain, or diarrhea.    ASSESSMENT      Discharge Assessment  Patient reports symptoms are: Improved  Does the patient have all of their medications?: Yes  Does patient know what their new medications are for?: Yes  Does patient have a follow-up appointment scheduled?: Yes  Does patient have any other questions or concerns?: No    Post-op  Did the patient have surgery or a procedure: No  Fever: No(99.9 first day home but nothing since)  Chills: No  Eating & Drinking: eating and drinking without complaints/concerns  PO Intake: regular diet  Bowel Function: normal  Urinary Status: voiding without complaint/concerns    PLAN                                                      Outpatient Plan:      PET/CT scheduled for 6/3/2020 for restaging and to confirm interval resolution  Appointment scheduled with Dr. Magaly Pollard in clinic 5/28/2020  Consult placed for symptom control visit with palliative care  Dietician appointtment scheduled for 6/8/2020 for weight loss    Future Appointments   Date Time Provider " [facility]   5/27/2020  6:30 AM  [facility]ASONIC LAB DRAW [facility]   5/27/2020  7:00 AM  ONCOLOGY INFUSION [facility]   5/28/2020 12:30 PM Magaly Pollard MD [facility]   6/2/2020  7:30 AM  [facility]ONIC LAB DRAW [facility]   6/2/2020  8:00 AM  ONCOLOGY INFUSION [facility]   6/3/2020  8:15 AM [id number] [place] [address]   6/4/2020  2:30 PM Magaly Pollard MD [facility]   6/8/2020 11:00 AM Omaira May RD [facility]   6/10/2020  8:20 AM Precious Scott MD [facility]           Lisa Hernandez, CMA

## 2020-05-27 ENCOUNTER — HOME INFUSION (PRE-WILLOW HOME INFUSION) (OUTPATIENT)
Dept: PHARMACY | Facility: CLINIC | Age: 55
End: 2020-05-27

## 2020-05-27 ENCOUNTER — INFUSION THERAPY VISIT (OUTPATIENT)
Dept: ONCOLOGY | Facility: CLINIC | Age: 55
End: 2020-05-27
Attending: INTERNAL MEDICINE
Payer: MEDICARE

## 2020-05-27 ENCOUNTER — APPOINTMENT (OUTPATIENT)
Dept: LAB | Facility: CLINIC | Age: 55
End: 2020-05-27
Attending: INTERNAL MEDICINE
Payer: MEDICARE

## 2020-05-27 ENCOUNTER — ANCILLARY PROCEDURE (OUTPATIENT)
Dept: GENERAL RADIOLOGY | Facility: CLINIC | Age: 55
End: 2020-05-27
Attending: INTERNAL MEDICINE
Payer: MEDICARE

## 2020-05-27 VITALS
BODY MASS INDEX: 27.2 KG/M2 | RESPIRATION RATE: 16 BRPM | TEMPERATURE: 98.1 F | OXYGEN SATURATION: 99 % | DIASTOLIC BLOOD PRESSURE: 60 MMHG | HEART RATE: 88 BPM | WEIGHT: 184.2 LBS | SYSTOLIC BLOOD PRESSURE: 106 MMHG

## 2020-05-27 DIAGNOSIS — K29.00 OTHER ACUTE GASTRITIS WITHOUT HEMORRHAGE: ICD-10-CM

## 2020-05-27 DIAGNOSIS — R14.0 BLOATING: ICD-10-CM

## 2020-05-27 DIAGNOSIS — C16.9 GASTRIC ADENOCARCINOMA (H): Primary | ICD-10-CM

## 2020-05-27 DIAGNOSIS — M79.671 RIGHT FOOT PAIN: Primary | ICD-10-CM

## 2020-05-27 DIAGNOSIS — C16.9 GASTRIC ADENOCARCINOMA (H): ICD-10-CM

## 2020-05-27 DIAGNOSIS — M79.671 RIGHT FOOT PAIN: ICD-10-CM

## 2020-05-27 LAB
ALBUMIN SERPL-MCNC: 2.7 G/DL (ref 3.4–5)
ALP SERPL-CCNC: 110 U/L (ref 40–150)
ALT SERPL W P-5'-P-CCNC: 25 U/L (ref 0–50)
ANION GAP SERPL CALCULATED.3IONS-SCNC: 4 MMOL/L (ref 3–14)
AST SERPL W P-5'-P-CCNC: 22 U/L (ref 0–45)
BACTERIA SPEC CULT: NO GROWTH
BACTERIA SPEC CULT: NO GROWTH
BASOPHILS # BLD AUTO: 0.1 10E9/L (ref 0–0.2)
BASOPHILS NFR BLD AUTO: 0.6 %
BILIRUB SERPL-MCNC: 0.1 MG/DL (ref 0.2–1.3)
BUN SERPL-MCNC: 9 MG/DL (ref 7–30)
CALCIUM SERPL-MCNC: 8.7 MG/DL (ref 8.5–10.1)
CHLORIDE SERPL-SCNC: 103 MMOL/L (ref 94–109)
CO2 SERPL-SCNC: 31 MMOL/L (ref 20–32)
CREAT SERPL-MCNC: 0.83 MG/DL (ref 0.52–1.04)
DIFFERENTIAL METHOD BLD: ABNORMAL
EOSINOPHIL # BLD AUTO: 0 10E9/L (ref 0–0.7)
EOSINOPHIL NFR BLD AUTO: 0.3 %
ERYTHROCYTE [DISTWIDTH] IN BLOOD BY AUTOMATED COUNT: 17.5 % (ref 10–15)
GFR SERPL CREATININE-BSD FRML MDRD: 80 ML/MIN/{1.73_M2}
GLUCOSE SERPL-MCNC: 92 MG/DL (ref 70–99)
HCT VFR BLD AUTO: 29.2 % (ref 35–47)
HGB BLD-MCNC: 9.1 G/DL (ref 11.7–15.7)
IMM GRANULOCYTES # BLD: 0.1 10E9/L (ref 0–0.4)
IMM GRANULOCYTES NFR BLD: 0.8 %
LYMPHOCYTES # BLD AUTO: 2.4 10E9/L (ref 0.8–5.3)
LYMPHOCYTES NFR BLD AUTO: 24.8 %
MCH RBC QN AUTO: 26.4 PG (ref 26.5–33)
MCHC RBC AUTO-ENTMCNC: 31.2 G/DL (ref 31.5–36.5)
MCV RBC AUTO: 85 FL (ref 78–100)
MONOCYTES # BLD AUTO: 1 10E9/L (ref 0–1.3)
MONOCYTES NFR BLD AUTO: 10.2 %
NEUTROPHILS # BLD AUTO: 6.2 10E9/L (ref 1.6–8.3)
NEUTROPHILS NFR BLD AUTO: 63.3 %
NRBC # BLD AUTO: 0 10*3/UL
NRBC BLD AUTO-RTO: 0 /100
PLATELET # BLD AUTO: 266 10E9/L (ref 150–450)
POTASSIUM SERPL-SCNC: 3.9 MMOL/L (ref 3.4–5.3)
PROT SERPL-MCNC: 6.1 G/DL (ref 6.8–8.8)
RBC # BLD AUTO: 3.45 10E12/L (ref 3.8–5.2)
SODIUM SERPL-SCNC: 139 MMOL/L (ref 133–144)
SPECIMEN SOURCE: NORMAL
SPECIMEN SOURCE: NORMAL
WBC # BLD AUTO: 9.8 10E9/L (ref 4–11)

## 2020-05-27 PROCEDURE — 85025 COMPLETE CBC W/AUTO DIFF WBC: CPT | Performed by: PHYSICIAN ASSISTANT

## 2020-05-27 PROCEDURE — 96417 CHEMO IV INFUS EACH ADDL SEQ: CPT

## 2020-05-27 PROCEDURE — G0498 CHEMO EXTEND IV INFUS W/PUMP: HCPCS

## 2020-05-27 PROCEDURE — 96413 CHEMO IV INFUSION 1 HR: CPT

## 2020-05-27 PROCEDURE — 96367 TX/PROPH/DG ADDL SEQ IV INF: CPT

## 2020-05-27 PROCEDURE — 25000128 H RX IP 250 OP 636: Mod: ZF | Performed by: INTERNAL MEDICINE

## 2020-05-27 PROCEDURE — 25800030 ZZH RX IP 258 OP 636: Mod: ZF | Performed by: PHYSICIAN ASSISTANT

## 2020-05-27 PROCEDURE — 96368 THER/DIAG CONCURRENT INF: CPT

## 2020-05-27 PROCEDURE — 96415 CHEMO IV INFUSION ADDL HR: CPT

## 2020-05-27 PROCEDURE — 80053 COMPREHEN METABOLIC PANEL: CPT | Performed by: PHYSICIAN ASSISTANT

## 2020-05-27 PROCEDURE — 96375 TX/PRO/DX INJ NEW DRUG ADDON: CPT

## 2020-05-27 PROCEDURE — 25000125 ZZHC RX 250: Mod: ZF | Performed by: PHYSICIAN ASSISTANT

## 2020-05-27 PROCEDURE — 25000128 H RX IP 250 OP 636: Mod: ZF | Performed by: PHYSICIAN ASSISTANT

## 2020-05-27 RX ORDER — HEPARIN SODIUM (PORCINE) LOCK FLUSH IV SOLN 100 UNIT/ML 100 UNIT/ML
5 SOLUTION INTRAVENOUS ONCE
Status: COMPLETED | OUTPATIENT
Start: 2020-05-27 | End: 2020-05-27

## 2020-05-27 RX ORDER — ONDANSETRON 4 MG/1
4 TABLET, ORALLY DISINTEGRATING ORAL EVERY 6 HOURS PRN
Qty: 90 TABLET | Refills: 3 | Status: SHIPPED | OUTPATIENT
Start: 2020-05-27 | End: 2020-07-20

## 2020-05-27 RX ADMIN — DEXTROSE MONOHYDRATE 250 ML: 50 INJECTION, SOLUTION INTRAVENOUS at 09:25

## 2020-05-27 RX ADMIN — OXALIPLATIN 169 MG: 100 INJECTION, SOLUTION, CONCENTRATE INTRAVENOUS at 09:25

## 2020-05-27 RX ADMIN — SODIUM CHLORIDE 100 MG: 9 INJECTION, SOLUTION INTRAVENOUS at 08:14

## 2020-05-27 RX ADMIN — Medication 20 MG: at 07:46

## 2020-05-27 RX ADMIN — DEXAMETHASONE SODIUM PHOSPHATE: 10 INJECTION, SOLUTION INTRAMUSCULAR; INTRAVENOUS at 07:49

## 2020-05-27 RX ADMIN — LEUCOVORIN CALCIUM 400 MG: 200 INJECTION, POWDER, LYOPHILIZED, FOR SOLUTION INTRAMUSCULAR; INTRAVENOUS at 09:25

## 2020-05-27 RX ADMIN — HEPARIN SODIUM (PORCINE) LOCK FLUSH IV SOLN 100 UNIT/ML 5 ML: 100 SOLUTION at 06:34

## 2020-05-27 RX ADMIN — SODIUM CHLORIDE 250 ML: 9 INJECTION, SOLUTION INTRAVENOUS at 07:46

## 2020-05-27 ASSESSMENT — PAIN SCALES - GENERAL: PAINLEVEL: MODERATE PAIN (5)

## 2020-05-27 NOTE — PROGRESS NOTES
Infusion Nursing Note:  Nathalie Bashir presents today for Day 1 Cycle 4 Taxotere, Oxaliplatin, Leucovorin, Fluorouracil pump connect.    Patient seen by provider today: No   present during visit today: Not Applicable.    Note: Nathalie arrives to infusion today doing much better. She was hospitalized last week for aspirated pneumonia and deferred chemo for neutropnia. She feels less fatigued and cough is improving. She did fall in the shower yesterday and hit her left shoulder and neck and injured her right foot pinky toe. She continues to have ongoing but stable stomach pain r/t her cancer. She declines pain intervention today.     Patient confirms she took oral Decadron as ordered.     Dr. Pollard paged with notification of fall and clarification of Neulasta On Pro this cycle.   TORB @ 0410 Dr. Pollard/ Clarice Davis RN: Patient should receive Neulasta On Pro with her pump disconnect tomorrow. Will order x-ray of right toe. Patient has virtual visit with Dr. Pollard tomorrow for follow-up.     Intravenous Access:  Implanted Port.    Treatment Conditions:  Lab Results   Component Value Date    HGB 9.1 05/27/2020     Lab Results   Component Value Date    WBC 9.8 05/27/2020      Lab Results   Component Value Date    ANEU 6.2 05/27/2020     Lab Results   Component Value Date     05/27/2020      Lab Results   Component Value Date     05/27/2020                   Lab Results   Component Value Date    POTASSIUM 3.9 05/27/2020           Lab Results   Component Value Date    MAG 2.0 03/31/2020            Lab Results   Component Value Date    CR 0.83 05/27/2020                   Lab Results   Component Value Date    PETRA 8.7 05/27/2020                Lab Results   Component Value Date    BILITOTAL 0.1 05/27/2020           Lab Results   Component Value Date    ALBUMIN 2.7 05/27/2020                    Lab Results   Component Value Date    ALT 25 05/27/2020           Lab Results   Component Value Date    AST 22  05/27/2020       Results reviewed, labs MET treatment parameters, ok to proceed with treatment.    Post Infusion Assessment:  Patient tolerated infusion without incident.  Blood return noted pre and post infusion and prior to pump connect.  Fluorouracil CADD pump connected at 1130 and set to run at 10 ml/hr. Patient set up for pump disconnect and Neulasta On Pro at [facility] on 05/28 at 1200.   All connections verified as taped and open and pump display RUNNING by second RN.     Discharge Plan:   Prescription refills given for Zofran, Protonix, and dexamethasone.  Copy of AVS reviewed with patient.  Patient will return 6/10 for next appointment.  Patient discharged in stable condition accompanied by: self.  Departure Mode: Ambulatory.  Face to Face time: 4.    Clarice Davis RN

## 2020-05-27 NOTE — PATIENT INSTRUCTIONS
Contact Numbers  [facility]: [phone] (for symptom and scheduling needs)    Please call the [facility] Triage line if you experience a temperature greater than or equal to 100.4, shaking chills, have uncontrolled nausea, vomiting and/or diarrhea, dizziness, shortness of breath, chest pain, bleeding, unexplained bruising, or if you have any other new/concerning symptoms, questions or concerns.     If you are having any concerning symptoms or wish to speak to a provider before your next infusion visit, please call your care coordinator or triage to notify them so we can adequately serve you.     If you need a refill on a narcotic prescription or other medication, please call triage before your infusion appointment.      Admission on 05/21/2020, Discharged on 05/23/2020   Component Date Value Ref Range Status     WBC 05/21/2020 7.6  4.0 - 11.0 10e9/L Final     RBC Count 05/21/2020 3.75* 3.8 - 5.2 10e12/L Final     Hemoglobin 05/21/2020 9.7* 11.7 - 15.7 g/dL Final     Hematocrit 05/21/2020 32.2* 35.0 - 47.0 % Final     MCV 05/21/2020 86  78 - 100 fl Final     MCH 05/21/2020 25.9* 26.5 - 33.0 pg Final     MCHC 05/21/2020 30.1* 31.5 - 36.5 g/dL Final     RDW 05/21/2020 17.1* 10.0 - 15.0 % Final     Platelet Count 05/21/2020 266  150 - 450 10e9/L Final     Diff Method 05/21/2020 Automated Method   Final     % Neutrophils 05/21/2020 66.0  % Final     % Lymphocytes 05/21/2020 14.0  % Final     % Monocytes 05/21/2020 18.1  % Final     % Eosinophils 05/21/2020 0.1  % Final     % Basophils 05/21/2020 0.5  % Final     % Immature Granulocytes 05/21/2020 1.3  % Final     Nucleated RBCs 05/21/2020 0  0 /100 Final     Absolute Neutrophil 05/21/2020 5.0  1.6 - 8.3 10e9/L Final     Absolute Lymphocytes 05/21/2020 1.1  0.8 - 5.3 10e9/L Final     Absolute Monocytes 05/21/2020 1.4* 0.0 - 1.3 10e9/L Final     Absolute Eosinophils 05/21/2020 0.0  0.0 - 0.7 10e9/L Final     Absolute Basophils 05/21/2020 0.0  0.0 - 0.2 10e9/L Final      Abs Immature Granulocytes 05/21/2020 0.1  0 - 0.4 10e9/L Final     Absolute Nucleated RBC 05/21/2020 0.0   Final     Sodium 05/21/2020 136  133 - 144 mmol/L Final     Potassium 05/21/2020 3.4  3.4 - 5.3 mmol/L Final     Chloride 05/21/2020 104  94 - 109 mmol/L Final     Carbon Dioxide 05/21/2020 25  20 - 32 mmol/L Final     Anion Gap 05/21/2020 7  3 - 14 mmol/L Final     Glucose 05/21/2020 114* 70 - 99 mg/dL Final     Urea Nitrogen 05/21/2020 13  7 - 30 mg/dL Final     Creatinine 05/21/2020 0.78  0.52 - 1.04 mg/dL Final     GFR Estimate 05/21/2020 87  >60 mL/min/[1.73_m2] Final    Comment: Non  GFR Calc  Starting 12/18/2018, serum creatinine based estimated GFR (eGFR) will be   calculated using the Chronic Kidney Disease Epidemiology Collaboration   (CKD-EPI) equation.       GFR Estimate If Black 05/21/2020 >90  >60 mL/min/[1.73_m2] Final    Comment:  GFR Calc  Starting 12/18/2018, serum creatinine based estimated GFR (eGFR) will be   calculated using the Chronic Kidney Disease Epidemiology Collaboration   (CKD-EPI) equation.       Calcium 05/21/2020 8.2* 8.5 - 10.1 mg/dL Final     Bilirubin Total 05/21/2020 0.2  0.2 - 1.3 mg/dL Final     Albumin 05/21/2020 2.8* 3.4 - 5.0 g/dL Final     Protein Total 05/21/2020 6.0* 6.8 - 8.8 g/dL Final     Alkaline Phosphatase 05/21/2020 141  40 - 150 U/L Final     ALT 05/21/2020 36  0 - 50 U/L Final     AST 05/21/2020 45  0 - 45 U/L Final     Color Urine 05/21/2020 Yellow   Final     Appearance Urine 05/21/2020 Clear   Final     Glucose Urine 05/21/2020 Negative  NEG^Negative mg/dL Final     Bilirubin Urine 05/21/2020 Negative  NEG^Negative Final     Ketones Urine 05/21/2020 Negative  NEG^Negative mg/dL Final     Specific Gravity Urine 05/21/2020 1.015  1.003 - 1.035 Final     Blood Urine 05/21/2020 Negative  NEG^Negative Final     pH Urine 05/21/2020 6.0  5.0 - 7.0 pH Final     Protein Albumin Urine 05/21/2020 Negative  NEG^Negative mg/dL Final      Urobilinogen mg/dL 05/21/2020 Normal  0.0 - 2.0 mg/dL Final     Nitrite Urine 05/21/2020 Negative  NEG^Negative Final     Leukocyte Esterase Urine 05/21/2020 Negative  NEG^Negative Final     Source 05/21/2020 Midstream Urine   Final     WBC Urine 05/21/2020 1  0 - 5 /HPF Final     RBC Urine 05/21/2020 2  0 - 2 /HPF Final     Squamous Epithelial /HPF Urine 05/21/2020 3* 0 - 1 /HPF Final     Mucous Urine 05/21/2020 Present* NEG^Negative /LPF Final     Specimen Description 05/21/2020 Blood Portacath   Final     Culture Micro 05/21/2020 No growth   Final     Specimen Description 05/21/2020 Blood Right Arm   Final     Culture Micro 05/21/2020 No growth   Final     Lipase 05/21/2020 40* 73 - 393 U/L Final     Lactic Acid 05/21/2020 1.0  0.7 - 2.0 mmol/L Final     COVID-19 Virus PCR to [facility] - So* 05/21/2020 Nasopharyngeal   Final     COVID-19 Virus PCR to [facility] - Re* 05/21/2020 Test received-See reflex to IDDL test SARS CoV2 (COVID-19) Virus RT-PCR   Final     Interpretation ECG 05/21/2020 Click View Image link to view waveform and result   Final     Troponin I ES 05/21/2020 <0.015  0.000 - 0.045 ug/L Final    Comment: The 99th percentile for upper reference range is 0.045 ug/L.  Troponin values   in the range of 0.045 - 0.120 ug/L may be associated with risks of adverse   clinical events.       SARS-CoV-2 Virus Specimen Source 05/21/2020 Nasopharyngeal   Final     SARS-CoV-2 PCR Result 05/21/2020 NEGATIVE   Final    Comment: SARS-CoV2 (COVID-19) RNA not detected, presumed negative.  Critical Value/Significant Value called to and read back by  Cornelia Krishna RN 5.21.20 @[facility]       SARS-CoV-2 PCR Comment 05/21/2020    Final                    Value:This automated, real-time RT-PCR assay by [employer] on the [employer] has   been given Emergency Use Authorization (EUA) for the in vitro qualitative detection of RNA   from the SARS-CoV2 virus in nasopharyngeal swabs in viral transport medium from  patients   with signs and symptoms of infection who are suspected of COVID-19. The performance is   unknown in asymptomatic patients.      Comment: Nasopharyngeal specimen is the preferred choice for swab-based SARS-CoV2   testing. Sample types other than nasopharyngeal swabs were not included in the   EUA. The test result from other sample types must be integrated into clinical   context for interpretation.  A negative result does not rule out the presence of real-time PCR inhibitors   in the specimen or COVID-19 RNA in concentration below the limit of detection   of the assay. The possibility of a false negative should be considered if the   patients recent exposure or clinical presentation suggests COVID-19.   Additional testing or repeat testing requires consultation with the   laboratory.  Positive results should also be reported in accordance with local, state, and   federal regulations. This test was validated by [facility]. This laboratory is certified under the   Clinical Laboratory Improvement Amendments of 1988 (CLIA-88) as qualified to   perform high complexity clinica                           l laboratory testing.       Adenovirus 05/21/2020 Not Detected  NDET^Not Detected Final     Coronavirus 05/21/2020 Not Detected  NDET^Not Detected Final    Comment: This test detects Coronavirus 229E, HKU1, NL63, and OC43 but does not   distinguish between them. It does not detect MERS ( Respiratory   Syndrome), SARS (Severe Acute Respiratory Syndrome) or 2019-nCoV (2019 Novel)   Coronavirus.       Human Metapneumovirus 05/21/2020 Not Detected  NDET^Not Detected Final     Human Rhinovirus/Enterovirus 05/21/2020 Not Detected  NDET^Not Detected Final     Influenza A 05/21/2020 Not Detected  NDET^Not Detected Final     Influenza A, H1 05/21/2020 Not Detected  NDET^Not Detected Final     Influenza A 2009 H1N1 05/21/2020 Not Detected  NDET^Not Detected  Final     Influenza A, H3 05/21/2020 Not Detected  NDET^Not Detected Final     Influenza B 05/21/2020 Not Detected  NDET^Not Detected Final     Parainfluenza Virus 1 05/21/2020 Not Detected  NDET^Not Detected Final     Parainfluenza Virus 2 05/21/2020 Not Detected  NDET^Not Detected Final     Parainfluenza Virus 3 05/21/2020 Not Detected  NDET^Not Detected Final     Parainfluenza Virus 4 05/21/2020 Not Detected  NDET^Not Detected Final     Respiratory Syncytial Virus A 05/21/2020 Not Detected  NDET^Not Detected Final     Respiratory Syncytial Virus B 05/21/2020 Not Detected  NDET^Not Detected Final     Chlamydia pneumoniae 05/21/2020 Not Detected  NDET^Not Detected Final     Mycoplasma pneumoniae 05/21/2020 Not Detected  NDET^Not Detected Final     Respiratory Virus Comment 05/21/2020 See comment below   Final    Comment:    The [employer] Respiratory Viral Panel is a qualitative nucleic acid, multiplex, in   vitro diagnostic test for the simultaneous detection and identification of   multiple respiratory viral and bacterial nucleic acids in nasopharyngeal swabs   collected in viral transport media from individuals exhibiting signs and   symptoms of respiratory infection.  The test detects Adenovirus, Coronavirus, Human Metapneumovirus, Human   Rhinovirus/Enterovirus, Influenza A (H1, H3, 2009 H1N1), Influenza B,   Respiratory Syncytial Virus A, Respiratory Syncytial Virus B, Parainfluenza   Virus (1, 2, 3, 4), Chlamydia pneumoniae and Mycoplasma pneumoniae.  The assay has received FDA approval for the testing of nasopharyngeal (NP)   swabs only. This test has been verified and is performed by the [facility] at [facility]. This test is used for   clinical purposes and should not be regarded as investigational or for   research.  This laboratory is certified under the C                           linical Laboratory Improvement   Amendments of 1988 (CLIA-88) as qualified to perform  high complexity clinical   laboratory testing.       Procalcitonin 05/21/2020 <0.05  ng/ml Final    Comment: <0.05 ng/ml  Normal  Recommendation: Very low risk of bacterial infection.   Discourage antibiotics unless strong clinical suspicion for serious infection.       Specimen Description 05/23/2020 Sputum   Final     Culture Micro 05/23/2020    Final                    Value:Light growth  Normal yasmine       Specimen Description 05/23/2020 Sputum   Final     Special Requests 05/23/2020 SCREEN   Final     Gram Stain 05/23/2020 <10 Squamous epithelial cells/low power field   Final     Gram Stain 05/23/2020 >25 PMNs/low power field   Final     Gram Stain 05/23/2020    Final                    Value:Few  Mixed gram positive yasmine       Specimen Description 05/21/2020 Nares   Final     Methicillin Resist/Sens S. aureus * 05/21/2020 Negative  NEG^Negative Final    Comment: MRSA Negative: SA Negative  MRSA and Staphylococcus aureus target DNA not   detected, presumed negative for MRSA and SA colonization or the number of   bacteria present may be below the limit of detection for the assay. FDA   approved assay performed using [employer] Gene[employer]pert(R) real-time PCR.       Platelet Count 05/21/2020 230  150 - 450 10e9/L Final     Creatinine 05/21/2020 0.84  0.52 - 1.04 mg/dL Final     GFR Estimate 05/21/2020 79  >60 mL/min/[1.73_m2] Final    Comment: Non  GFR Calc  Starting 12/18/2018, serum creatinine based estimated GFR (eGFR) will be   calculated using the Chronic Kidney Disease Epidemiology Collaboration   (CKD-EPI) equation.       GFR Estimate If Black 05/21/2020 >90  >60 mL/min/[1.73_m2] Final    Comment:  GFR Calc  Starting 12/18/2018, serum creatinine based estimated GFR (eGFR) will be   calculated using the Chronic Kidney Disease Epidemiology Collaboration   (CKD-EPI) equation.       Specimen Description 05/22/2020 Urine   Final     L Pneumo Urine Antigen 05/22/2020    Final                     Value:Presumptive negative for Legionella pneumophilia serogroup 1 antigen in urine, suggesting   no recent or current infection.  Infection due to Legionella cannot be ruled out, since   other serogroups and species may cause disease, antigen may not be present in urine in   early infection, and the level of antigen present in the urine may be below detectable   limits of the test.       Specimen Description 05/22/2020 Midstream Urine   Final     S Pneumoniae Antigen 05/22/2020    Final                    Value:Negative, no Streptococcus pneumoniae antigen detected by immunochromatographic membrane   assay. A negative Streptococcus pneumoniae antigen result does not rule out infection with   Streptococcus pneumoniae.       WBC 05/22/2020 10.4  4.0 - 11.0 10e9/L Final     RBC Count 05/22/2020 3.22* 3.8 - 5.2 10e12/L Final     Hemoglobin 05/22/2020 8.4* 11.7 - 15.7 g/dL Final     Hematocrit 05/22/2020 28.1* 35.0 - 47.0 % Final     MCV 05/22/2020 87  78 - 100 fl Final     MCH 05/22/2020 26.1* 26.5 - 33.0 pg Final     MCHC 05/22/2020 29.9* 31.5 - 36.5 g/dL Final     RDW 05/22/2020 17.2* 10.0 - 15.0 % Final     Platelet Count 05/22/2020 246  150 - 450 10e9/L Final     Sodium 05/22/2020 142  133 - 144 mmol/L Final     Potassium 05/22/2020 3.4  3.4 - 5.3 mmol/L Final     Chloride 05/22/2020 111* 94 - 109 mmol/L Final     Carbon Dioxide 05/22/2020 25  20 - 32 mmol/L Final     Anion Gap 05/22/2020 6  3 - 14 mmol/L Final     Glucose 05/22/2020 97  70 - 99 mg/dL Final     Urea Nitrogen 05/22/2020 8  7 - 30 mg/dL Final     Creatinine 05/22/2020 0.71  0.52 - 1.04 mg/dL Final     GFR Estimate 05/22/2020 >90  >60 mL/min/[1.73_m2] Final    Comment: Non  GFR Calc  Starting 12/18/2018, serum creatinine based estimated GFR (eGFR) will be   calculated using the Chronic Kidney Disease Epidemiology Collaboration   (CKD-EPI) equation.       GFR Estimate If Black 05/22/2020 >90  >60 mL/min/[1.73_m2] Final     Comment:  GFR Calc  Starting 12/18/2018, serum creatinine based estimated GFR (eGFR) will be   calculated using the Chronic Kidney Disease Epidemiology Collaboration   (CKD-EPI) equation.       Calcium 05/22/2020 8.0* 8.5 - 10.1 mg/dL Final     Iron 05/22/2020 12* 35 - 180 ug/dL Final     Iron Binding Cap 05/22/2020 303  240 - 430 ug/dL Final     Iron Saturation Index 05/22/2020 4* 15 - 46 % Final     Transferrin 05/22/2020 233  210 - 360 mg/dL Final     Ferritin 05/22/2020 40  8 - 252 ng/mL Final     WBC 05/23/2020 9.7  4.0 - 11.0 10e9/L Final     RBC Count 05/23/2020 3.19* 3.8 - 5.2 10e12/L Final     Hemoglobin 05/23/2020 8.4* 11.7 - 15.7 g/dL Final     Hematocrit 05/23/2020 28.1* 35.0 - 47.0 % Final     MCV 05/23/2020 88  78 - 100 fl Final     MCH 05/23/2020 26.3* 26.5 - 33.0 pg Final     MCHC 05/23/2020 29.9* 31.5 - 36.5 g/dL Final     RDW 05/23/2020 17.6* 10.0 - 15.0 % Final     Platelet Count 05/23/2020 231  150 - 450 10e9/L Final     Sodium 05/23/2020 141  133 - 144 mmol/L Final     Potassium 05/23/2020 3.1* 3.4 - 5.3 mmol/L Final     Chloride 05/23/2020 113* 94 - 109 mmol/L Final     Carbon Dioxide 05/23/2020 26  20 - 32 mmol/L Final     Anion Gap 05/23/2020 2* 3 - 14 mmol/L Final     Glucose 05/23/2020 91  70 - 99 mg/dL Final     Urea Nitrogen 05/23/2020 4* 7 - 30 mg/dL Final     Creatinine 05/23/2020 0.82  0.52 - 1.04 mg/dL Final     GFR Estimate 05/23/2020 82  >60 mL/min/[1.73_m2] Final    Comment: Non  GFR Calc  Starting 12/18/2018, serum creatinine based estimated GFR (eGFR) will be   calculated using the Chronic Kidney Disease Epidemiology Collaboration   (CKD-EPI) equation.       GFR Estimate If Black 05/23/2020 >90  >60 mL/min/[1.73_m2] Final    Comment:  GFR Calc  Starting 12/18/2018, serum creatinine based estimated GFR (eGFR) will be   calculated using the Chronic Kidney Disease Epidemiology Collaboration   (CKD-EPI) equation.       Calcium  05/23/2020 7.8* 8.5 - 10.1 mg/dL Final     Potassium 05/23/2020 3.4  3.4 - 5.3 mmol/L Final

## 2020-05-27 NOTE — NURSING NOTE
Chief Complaint   Patient presents with     Port Draw     Labs drawn via port by RN in lab. VS taken. Patient checked in for next appt.     Port accessed with 20 gauge flat needle by RN, labs collected, line flushed with saline and heparin.  Vitals taken. Pt checked in for appointment.    Marycruz Nagel RN

## 2020-05-28 ENCOUNTER — VIRTUAL VISIT (OUTPATIENT)
Dept: ONCOLOGY | Facility: CLINIC | Age: 55
End: 2020-05-28
Attending: INTERNAL MEDICINE
Payer: MEDICARE

## 2020-05-28 VITALS
OXYGEN SATURATION: 97 % | DIASTOLIC BLOOD PRESSURE: 71 MMHG | TEMPERATURE: 98.5 F | RESPIRATION RATE: 18 BRPM | SYSTOLIC BLOOD PRESSURE: 111 MMHG

## 2020-05-28 DIAGNOSIS — C16.9 GASTRIC ADENOCARCINOMA (H): Primary | ICD-10-CM

## 2020-05-28 PROCEDURE — 25000128 H RX IP 250 OP 636: Mod: ZF | Performed by: PHYSICIAN ASSISTANT

## 2020-05-28 PROCEDURE — 99215 OFFICE O/P EST HI 40 MIN: CPT | Mod: 95 | Performed by: INTERNAL MEDICINE

## 2020-05-28 PROCEDURE — 25000128 H RX IP 250 OP 636: Mod: ZF | Performed by: INTERNAL MEDICINE

## 2020-05-28 PROCEDURE — 96377 APPLICATON ON-BODY INJECTOR: CPT

## 2020-05-28 RX ORDER — LORAZEPAM 0.5 MG/1
0.5 TABLET ORAL EVERY 4 HOURS PRN
Qty: 30 TABLET | Refills: 5 | Status: SHIPPED | OUTPATIENT
Start: 2020-05-28 | End: 2020-09-24

## 2020-05-28 RX ORDER — HEPARIN SODIUM (PORCINE) LOCK FLUSH IV SOLN 100 UNIT/ML 100 UNIT/ML
5 SOLUTION INTRAVENOUS
Status: DISCONTINUED | OUTPATIENT
Start: 2020-05-28 | End: 2020-05-28 | Stop reason: HOSPADM

## 2020-05-28 RX ADMIN — Medication 5 ML: at 11:59

## 2020-05-28 RX ADMIN — PEGFILGRASTIM 6 MG: KIT SUBCUTANEOUS at 11:59

## 2020-05-28 NOTE — PROGRESS NOTES
Infusion Nursing Note:  Nathalie Bashir presents today for Cycle 4 Day 2 Pump disconnect and Neulasta OnPro.    Patient seen by provider today: Yes: Dr. Pollard.   present during visit today: Not Applicable.    Note: Neulasta On Pro- On Body injector applied to patient's left abdomen today on 5/28 at 1200 with light facing up. Writer discussed Neulasta injection would start tomorrow at 3pm, approximately 27 hours after application applied today.  Written and Verbal instruction reviewed with patient.  Pt instructed when the dose delivery starts, it will take about 45 minutes to complete. Pt aware Neulasta Onpro On-Body should have green flashing light and to call triage or on-call MD if injector flashes red or appears to be leaking. Pt aware to keep Onpro On-Body Neualsta 4 inches away from electrical equipment and to avoid showering 4 hours prior to injection.     Intravenous Access:  Implanted Port.    Treatment Conditions:  Not Applicable.    Post Infusion Assessment:  Patient tolerated infusion without incident.  Blood return noted pre and post infusion.  Site patent and intact, free from redness, edema or discomfort.  No evidence of extravasations.  Access discontinued per protocol.     Discharge Plan:   Patient needs a prescription refill for Ativan. No refills in pharmacy. Page sent to Dr. Pollard. Pt to call pharmacy tomorrow.   AVS to patient via [facility].  Patient will return 6/10 for next appointment.   Patient discharged in stable condition accompanied by: self.  Departure Mode: Ambulatory.  Face to Face time: 0.    Bernadette Romo RN

## 2020-05-28 NOTE — PATIENT INSTRUCTIONS
[facility] Triage and after hours / weekends / holidays:  [phone]    Please call the triage or after hours line if you experience a temperature greater than or equal to 100.5, shaking chills, have uncontrolled nausea, vomiting and/or diarrhea, dizziness, shortness of breath, chest pain, bleeding, unexplained bruising, or if you have any other new/concerning symptoms, questions or concerns.      If you are having any concerning symptoms or wish to speak to a provider before your next infusion visit, please call your care coordinator or triage to notify them so we can adequately serve you.     If you need a refill on a narcotic prescription or other medication, please call before your infusion appointment.                 May 2020      Angelo Monday Tuesday Wednesday Thursday Friday Saturday                            1     2       3     4    [facility] DRAW   6:45 AM   (15 min.)    [facility]   [facility]    TELEPHONE VISIT RETURN   7:30 AM   (50 min.)   Julienne Eden PA-C   [facility] ONC INFUSION 360   9:00 AM   (360 min.)    ONCOLOGY INFUSION   [facility] 5    [facility] ONC INFUSION 60   2:00 PM   (60 min.)    ONCOLOGY INFUSION   [facility] 6     7     8     9       10     11     12     13     14     15     16       17     18    [facility] DRAW   9:00 AM   (15 min.)    [facility]   [facility]    VIDEO VISIT RETURN  12:15 PM   (50 min.)   Roxana Sweet PA-C   [facility] DRAW   3:45 PM   (15 min.)    [facility] DRAW   [facility] Draw 19     20     21    Admission  11:50 AM   Bree Giang MD   Unit 7D [facility] [place]   (Discharge: 5/23/2020)    XR CHEST PORT 1 VIEW  12:15 PM   (20 min.)   [id number]   [facility],  Radiology    CT CHEST PULMONARY EMBOLISM W   2:35 PM   (20 min.)   [id number]   [facility], [place], CT 22    IP EVALUATION    6:00 AM   (60 min.)   Crissy Stallworth, SLP   [facility], [place], Speech Therapy 23       24     25     26     27    [facility] DRAW   6:30 AM   (15 min.)    [facility] DRAW   [facility] Lab Draw    [facility] ONC INFUSION 360   7:00 AM   (360 min.)    ONCOLOGY INFUSION   [facility]    XR FOOT RIGHT G/E 3 VIEWS  11:45 AM   (20 min.)   [id number]   [facility] Xray 28    VIDEO VISIT RETURN   8:45 AM   (30 min.)   Magaly Pollard MD   [facility] ONC INFUSION 60  12:00 PM   (60 min.)    ONCOLOGY INFUSION   [facility] 29 30 31 June 2020 Sunday Monday Tuesday Wednesday Thursday Friday Saturday        1     2     3    PET ONCOLOGY WHOLE BODY   8:15 AM   (45 min.)   [id number]   [facility], [place] PET CT 4    VIDEO VISIT RETURN   2:15 PM   (30 min.)   Magaly Pollard MD   [facility] 5     6       7     8    P NEW  10:45 AM   (60 min.)   Omaira May RD   [facility] 9     10    VIDEO VISIT RETURN   8:05 AM   (40 min.)   Precious Scott MD   [facility] DRAW  10:00 AM   (15 min.)   [facility]  [facility] DRAW   [facility] Lab Draw    VIDEO VISIT RETURN  12:15 PM   (50 min.)   Roxana Sweet PA-C   [facility] 11    [facility] ONC INFUSION 360   8:30 AM   (360 min.)    ONCOLOGY INFUSION   [facility] 12     13       14     15     16     17     18     19     20       21     22     23     24     25     26     27       28     29     30                                      No results found for this or any previous visit (from the past 24 hour(s)).

## 2020-05-28 NOTE — PROGRESS NOTES
"Nathalie Bashir is a 54 year old female who is being evaluated via a billable video visit.      The patient has been notified of following:     \"This video visit will be conducted via a call between you and your physician/provider. We have found that certain health care needs can be provided without the need for an in-person physical exam.  This service lets us provide the care you need with a video conversation.  If a prescription is necessary we can send it directly to your pharmacy.  If lab work is needed we can place an order for that and you can then stop by our lab to have the test done at a later time.    Video visits are billed at different rates depending on your insurance coverage.  Please reach out to your insurance provider with any questions.    If during the course of the call the physician/provider feels a video visit is not appropriate, you will not be charged for this service.\"    Patient has given verbal consent for Video visit? Yes    How would you like to obtain your AVS? MyChart    Patient would like the video invitation sent by: Text to cell phone: [phone]    Will anyone else be joining your video visit? No      I have reviewed and updated the patient's allergies and medication list.    Concerns: No  Refills: lorazepam    Secondary Video Option (Doximity), send text message to: [phone]    June Dickerson LPN        Video-Visit Details    Type of service:  Video Visit    Video Start Time: 9:07 AM  Video End Time: 9:16 AM    Originating Location (pt. Location): Home    Distant Location (provider location):  [facility] CAN[facility]     Platform used for Video Visit: Miguel A Pollard MD        "

## 2020-05-28 NOTE — LETTER
"5/28/2020       RE: Nathalie Bashir  [address] [facility] [id number]        Dear Colleague,    Thank you for referring your patient, Nathalie Bashir, to the [facility]. Please see a copy of my visit note below.    Nathalie Bashir is a 54 year old female who is being evaluated via a billable video visit.      The patient has been notified of following:     \"This video visit will be conducted via a call between you and your physician/provider. We have found that certain health care needs can be provided without the need for an in-person physical exam.  This service lets us provide the care you need with a video conversation.  If a prescription is necessary we can send it directly to your pharmacy.  If lab work is needed we can place an order for that and you can then stop by our lab to have the test done at a later time.    Video visits are billed at different rates depending on your insurance coverage.  Please reach out to your insurance provider with any questions.    If during the course of the call the physician/provider feels a video visit is not appropriate, you will not be charged for this service.\"    Patient has given verbal consent for Video visit? Yes    How would you like to obtain your AVS? MyChart    Patient would like the video invitation sent by: Text to cell phone: [phone]    Will anyone else be joining your video visit? No      I have reviewed and updated the patient's allergies and medication list.    Concerns: No  Refills: [email or web address]    Secondary Video Option (Doximity), send text message to: [phone]    June Dickerson LPN        Video-Visit Details    Type of service:  Video Visit    Video Start Time: 9:07 AM  Video End Time: 9:16 AM    Originating Location (pt. Location): Home    Distant Location (provider location):  [facility]     Platform used for Video Visit: Miguel A Pollard MD          Oncology follow up visit:  Date on this visit: " 5/28/2020     Nathalie Bashir  is referred by Dr.Sara Gonzalez for an oncology consultation. She requires evaluation for new diagnosis of Gastric cancer.    Primary Physician: Marianne Gonzalez     History Of Present Illness:    Please see my previous note for details.  I have copied and updated from prior note.  He  Ms. Bashir is a 54 year old female who comes in today for newly diagnosed gastric adenocarcinoma.  She tells me that she was being followed for a pancreatic duct dilatation and pancreatic cyst which was noted in November 2018.  Since May 2019 she started noticing some nausea and vomiting and occasional abdominal discomfort.    She had an ultrasound in August 2019 which was essentially unremarkable.  She had a repeat endoscopic ultrasound on 10/29/2019 which showed increase in size of the cyst as well as dilation of the main pancreatic duct.  FNA and fluid analysis showed mucinous epithelium.  She was referred to Dr. 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  Surprisingly there was no pancreatic ductal neoplasm seen but on the resected specimen stomach cancer was noted.  It was poorly differentiated adenocarcinoma with poorly cohesive/signet ring cell type with proximal gastric mucosal and serosal margins being positive.  There was lymphovascular and perineural invasion seen.  22 lymph nodes were sampled and all were benign.  It was a pT4aN0 lesion.  HER-2/christine FISH was not amplified.  Because of the surprising finding she was referred to medical oncology.  Prior to that she was seen by Dr. Mallory who recommended and arranged further restaging procedure with EUS and a PET scan but because of recent surgery, the timing of the PET scan was moved forward so that it is at least 6 weeks out from the surgery so that it does not interfere with postsurgical inflammation.      EUS on 3/3/2020 showed:     Postop changes consistent with prior non-pylorus                         sparing Whipple resection. Some mucosal nodularity along                        the lesser curvature with scattered exudate. By                        ultrasound this appears to correspond to a staple or                        suture line. No gross evidence of neoplasm, however the                        previous neoplasm was infiltrative and may not be                        readily seen endoscopically.                        Non-specific loss of sonographic wall layers along the                        lesser curvature in this region adjacent to apparent                        suture. This is potentially consistent with                        post-operative edema, however it is not possible to                        completely exclude infiltrative tumor in this region.                        There was no target for needle aspiration (wall 3 mm in                        this region). Forceops biopsies obtained from this                        region and separately along the gastrojejunal                        anastomosis.                        - Two prominent left gastric artery lymph nodes. Needle                        aspiration performed and preliminary cytology suggested                        benign lymphoid material.                        - No liver lesions seen in the visualized portion                        (limited by post-op changes).     Left gastric lymph node fine-needle aspiration was negative for carcinoma.  Biopsy from the gastric jejunal anastomosis as well as lesser curvature of the stomach also did not show any malignancy.      She had a PET/CT on 3/13/2020 which showed soft tissue streaky density with increased FDG uptake adjacent to the pancreaticojejunostomy which could be neoplastic in origin.  There is mild increased FDG uptake in the gastric remnant.  Focal area of soft tissue thickening with fatty streakiness along medial laparotomy with increased FDG uptake which likely is  inflammatory.  Increased FDG uptake in thoracic esophagus which could be esophagitis.    I discussed with Dr Mallory and he also believes that we should go ahead with chemotherapy as the increased FDG activity around the pancreatojejunostomy is post surgical and likely inflammatory. As per him, she probably had a very small leak that formed a phlegmon. and it would be okay to start chemotherapy as planned and we can look at new scans in a couple of months.     She started on chemotherapy with 5FU, oxaliplatin, and Taxotere on 3/27/20. She was seen on 4/7/20 for evaluation of fevers. She was treated for possible pneumonia with Levaquin.      She was seen in clinic 4/13/20 for RUQ pain,CT and labs reassuring.     C#2 4/20/2020    C#3 5/4/2020    She then was admitted with neutropenic aspiration pneumonia from 5/21/2020 till 5/23/2020.  She was initially started on ceftriaxone azithromycin and Flagyl but then clindamycin was given because of concern for aspiration pneumonia.      Cycle #4 5/27/2020 ( with Onpro supoprt ).    Interval hx:  She was admitted to the hospital from 5/21/2020 till 5/23/2020 for aspiration pneumonia.  She was neutropenic. She was treated with antibiotics.  He was discharged and is now doing well.  She received cycle #4 of chemotherapy yesterday.    Overall doing well. She has cold sensitivity for 1 week but no neuropathy outside of cold sensitivity. Has some nausea and taking nausea meds. BMs have been OK. No more fevers. No SOB. No cough. She is done with the antibiotic course. No new swellings. Fell in the shower on 5/26/2020 and hurt her right little toe but XRAY was negative for fracture. Energy is fair. She takes tramadol every 6 hrs for LUQ pain which is under good control.    ECOG 1    ROS:  A comprehensive ROS was otherwise neg        I reviewed other history in epic as below.    Past Medical/Surgical History:  Past Medical History:   Diagnosis Date     Allergic rhinitis      Cancer (H)      stomach cancer     Depression      Lumbago      Migraine      Other chronic pain     low back     Sacroiliitis (H)     low back pain     Trochanteric bursitis     leg length discrrepancy, hip pain     She has history of left leg trauma when she was 4 years of age and since then she has had issues with the left leg length and developed arthritis in the left hip requiring left hip replacement in 2016.  She also had left sacroiliac joint fusion several years ago.      Past Surgical History:   Procedure Laterality Date     ARTHROPLASTY HIP Left 2016     BACK SURGERY      L4-5 decompression     C REPAIR DETACH RETINA,SCLERAL BUCKLE       CATARACT IOL, RT/LT       CHOLECYSTECTOMY N/A 1/28/2020    Procedure: Cholecystectomy;  Surgeon: Yandel Mallory MD;  Location: [facility] OR     ESOPHAGOSCOPY, GASTROSCOPY, DUODENOSCOPY (EGD), COMBINED N/A 3/3/2020    Procedure: ESOPHAGOGASTRODUODENOSCOPY, WITH ENDOSCOPIC US (enoxaparin);  Surgeon: Bakari Plata MD;  [facility]: [facility]     EYE SURGERY       IR CHEST PORT PLACEMENT > 5 YRS OF AGE  3/26/2020     OPEN REDUCTION INTERNAL FIXATION RODDING INTRAMEDULLARY FEMUR Left 12/23/2014    Procedure: OPEN REDUCTION INTERNAL FIXATION RODDING INTRAMEDULLARY FEMUR;  Surgeon: Arnol Santiago MD;  Location: [facility]     ORTHOPEDIC SURGERY       PICC DOUBLE LUMEN PLACEMENT Right 02/07/2020    5 fr double lumen 41 cm     REMOVE HARDWARE LOWER EXTREMITY Left 4/7/2015    Procedure: REMOVE HARDWARE LOWER EXTREMITY;  Surgeon: Arnol Santiago MD;  Location: [facility] OR     REMOVE HARDWARE RODDING INTRAMEDULLARY FEMUR Left 12/17/2015    Procedure: REMOVE HARDWARE RODDING INTRAMEDULLARY FEMUR;  Surgeon: Arnol Santiago MD;  Location: [facility]     RESECT BONE LOWER EXTREMITY Left 12/23/2014    Procedure: RESECT BONE LOWER EXTREMITY;  Surgeon: Arnol Santiago MD;  Location: [facility]     WHIPPLE PROCEDURE N/A 1/28/2020    Procedure: Open Whipple procedure and Cholecystectomy;  Surgeon: Yandel Mallory MD;   Location: [facility] OR     Cancer History:   As above    Allergies:  Allergies as of 05/28/2020 - Reviewed 05/28/2020   Allergen Reaction Noted     Gluten meal Palpitations 01/17/2020     Pregabalin  12/15/2015     Augmentin Rash 02/27/2020     Acetaminophen Nausea and Other (See Comments) 07/13/2017     Dust mite extract  12/15/2015     Gabapentin GI Disturbance 12/15/2015     Methadone Fatigue 12/15/2015     Mold  12/15/2015     Naprosyn [naproxen] GI Disturbance 12/15/2015     Oxycontin [oxycodone]  01/17/2020     Oxymetholone  01/17/2020     Seasonal allergies  12/15/2015     Topiramate Other (See Comments) 04/04/2016     Latex Rash 12/19/2014     Current Medications:  Current Outpatient Medications   Medication Sig Dispense Refill     amitriptyline (ELAVIL) 25 MG tablet Take 3 tablets by mouth At Bedtime       calcium carb 1250 mg, 500 mg Kenaitze,/vitamin D 200 units (OSCAL WITH D) 500-200 MG-UNIT per tablet Take 1 tablet by mouth 3 times daily (with meals)       citalopram (CELEXA) 20 MG tablet Take 20 mg by mouth every morning        dexamethasone (DECADRON) 4 MG tablet Take 1 tablet (4mg) the day before, day of, and 2 days after chemotherapy 12 tablet 3     diclofenac (FLECTOR) 1.3 % patch Place 1 patch onto the skin daily as needed        famotidine (PEPCID) 20 MG tablet Take 1 tablet (20 mg) by mouth 2 times daily 60 tablet 3     fluticasone (FLONASE) 50 MCG/ACT nasal spray Spray 2 sprays into both nostrils daily as needed        hydrOXYzine (ATARAX) 10 MG tablet Take 10 mg by mouth 3 times daily as needed for itching       LORazepam (ATIVAN) 0.5 MG tablet Take 1 tablet (0.5 mg) by mouth every 4 hours as needed (Anxiety, Nausea/Vomiting or Sleep) 30 tablet 5     meclizine (ANTIVERT) 25 MG tablet Take 1 tablet (25 mg) by mouth 3 times daily as needed for dizziness 30 tablet 0     multivitamin, therapeutic with minerals (MULTI-VITAMIN) TABS Take 1 tablet by mouth daily       OLANZapine zydis (ZYPREXA) 5 MG ODT Take 1  tablet (5 mg) by mouth At Bedtime 30 tablet 0     ondansetron (ZOFRAN) 8 MG tablet Take 1 tablet (8 mg) by mouth every 8 hours as needed (Nausea/Vomiting) 10 tablet 7     ondansetron (ZOFRAN-ODT) 4 MG ODT tab Take 1 tablet (4 mg) by mouth every 6 hours as needed for nausea or vomiting 90 tablet 3     pantoprazole (PROTONIX) 40 MG EC tablet Take 1 tablet (40 mg) by mouth 2 times daily 60 tablet 1     senna-docusate (SENOKOT-S/PERICOLACE) 8.6-50 MG tablet Take 1 tablet by mouth daily as needed for constipation 30 tablet 0     simethicone 80 MG TABS Take 1 tablet by mouth every 6 hours as needed (bloating, gas, belching) 90 tablet 3     SUMAtriptan (IMITREX) 100 MG tablet Take 100 mg by mouth as needed       tiZANidine (ZANAFLEX) 4 MG tablet Take 1 tablet by mouth 2 times daily as needed       traMADol (ULTRAM) 50 MG tablet Take 1 tablet (50 mg) by mouth every 6 hours as needed for severe pain 60 tablet 0     acetaminophen (TYLENOL) 325 MG tablet Take 2 tablets (650 mg) by mouth every 6 hours as needed for pain (Patient not taking: Reported on 5/27/2020) 120 tablet 3      Family History:  Family History   Problem Relation Age of Onset     Heart Failure Mother 77     Anesthesia Reaction No family hx of      Deep Vein Thrombosis No family hx of    Paternal grandmother had uterine cancer at 97.  Patient has 1 son who is healthy.      Social History:  Social History     Socioeconomic History     Marital status:      Spouse name: Not on file     Number of children: Not on file     Years of education: Not on file     Highest education level: Not on file   Occupational History     Not on file   Social Needs     Financial resource strain: Not on file     Food insecurity     Worry: Not on file     Inability: Not on file     Transportation needs     Medical: Not on file     Non-medical: Not on file   Tobacco Use     Smoking status: Former Smoker     Packs/day: 0.50     Years: 25.00     Pack years: 12.50     Types:  Cigarettes     Last attempt to quit: 2020     Years since quittin.4     Smokeless tobacco: Never Used   Substance and Sexual Activity     Alcohol use: No     Drug use: No     Sexual activity: Not on file   Lifestyle     Physical activity     Days per week: Not on file     Minutes per session: Not on file     Stress: Not on file   Relationships     Social connections     Talks on phone: Not on file     Gets together: Not on file     Attends Religion service: Not on file     Active member of club or organization: Not on file     Attends meetings of clubs or organizations: Not on file     Relationship status: Not on file     Intimate partner violence     Fear of current or ex partner: Not on file     Emotionally abused: Not on file     Physically abused: Not on file     Forced sexual activity: Not on file   Other Topics Concern     Parent/sibling w/ CABG, MI or angioplasty before 65F 55M? Not Asked   Social History Narrative     Not on file     She used to smoke but quit on the date of her surgery.  Denies alcohol use.  Lives with her , son and 3 grandkids.    Physical Exam:  LMP 2014      Wt Readings from Last 4 Encounters:   20 83.6 kg (184 lb 3.2 oz)   20 83.7 kg (184 lb 9.6 oz)   20 80.5 kg (177 lb 6.4 oz)   20 81.8 kg (180 lb 4.8 oz)           Constitutional.  Does not seem to be in any acute distress.  Eyes.  No redness or discharge noted.  Respiratory.  Speaking in full sentences.  Breathing seems comfortable without any accessory use of muscles.    Skin.  Visualized his skin does not show any obvious rashes.  She has loss of his scalp hair.  Musculoskeletal.  Range of motion for visualized areas is intact.  Neurological.  Alert and oriented x3.  Psychiatric.  Mood, mentation and affect are normal.  Decision making capacity is intact.      The rest of a comprehensive physical examination is deferred due to Public Health Emergency video visit  restrictions.      Laboratory/Imaging Studies      Reviewed      ASSESSMENT/PLAN:    Incidentally found poorly differentiated signet ring type gastric adenocarcinoma, HER-2 negative.  This was found on Whipple surgery specimen as it was done for presumed pancreatic IPMN although no pancreatic neoplasm was found.  It was a pT4aN0 lesion with lymphovascular invasion and perineural invasion seen.  All 22 lymph nodes were negative.    EUS which was done on 3/3/2020 did not show any convincing evidence of malignancy.  Left gastric lymph node fine-needle aspiration was negative for carcinoma.  Biopsy from the gastric jejunal anastomosis as well as lesser curvature of the stomach also did not show any malignancy.      She had a PET/CT on 3/13/2020 which showed soft tissue streaky density with increased FDG uptake adjacent to the pancreaticojejunostomy which could be neoplastic in origin.  Other findings were likely reactive.  No other evidence of metastatic disease.    I discussed with Dr Mallory and he also believes that we should go ahead with chemotherapy as the increased FDG activity around the pancreatojejunostomy is post surgical and likely inflammatory. As per him, she probably had a very small leak that formed a phlegmon. and it would be okay to start chemotherapy as planned and we can look at new scans in a couple of months.     She started FLOT chemotherapy on 3/27/2020.  Cycle #4 was given on 5/27/2020.    Overall she is tolerating the chemotherapy fairly well but she had neutropenia and she was admitted for aspiration pneumonia at that time.  Due to this reason we will give the cycle #4 with onpro-support.    She will have repeat PET CT scan after completing the cycle and then we will decide about potentially continuing with chemotherapy versus surgery.    Neutropenic fever.  She had neutropenic fever with aspiration pneumonia and she was discharged on 5/23/2020.  She has completed the antibiotic course.  She  has recovered well.  With cycle #4, we are giving her growth factor support because of chemotherapy-induced neutropenia.    Nausea.  He had some nausea which is well controlled with antinausea medications.    Cold sensitivity.  From oxaliplatin.  This is lasting about a week but fortunately she does not have any neuropathy outside of cold sensitivity.  Continue to monitor.    Left upper quadrant pain.  Continue as needed tramadol.  She is followed with pain clinic/palliative care.     Anemia.  Hemoglobin is 9.1.  This is from the cancer, then the Whipple surgery as well as chemotherapy.  Continue to observe.    Right foot injury.  She fell a couple of days ago without any loss of consciousness.  She hit the right foot and has pain and swelling in the right greater toe.  Foot x-ray which was done yesterday did not show any fractures.  This is likely more of soft tissue injury.  She will continue symptomatic management with applying ice and hopefully this will get better.      Genetic testing.  This was negative.      RTC as scheduled    I answered all of her questions to her satisfaction.  She is agreeable and comfortable with the plan.      Magaly Pollard MD    Transition Care Management Services  No data recorded  No data recorded  No data recorded  Interactive contact date: 5/24/2020  Face-to-face visit date: 5/28/2020    Medical complexity decision making: High complexity ([id number])

## 2020-05-28 NOTE — PROGRESS NOTES
Oncology follow up visit:  Date on this visit: 5/28/2020     Nathalie Bashir  is referred by Dr.Sara Gonzalez for an oncology consultation. She requires evaluation for new diagnosis of Gastric cancer.    Primary Physician: Marianne Gonzalez     History Of Present Illness:    Please see my previous note for details.  I have copied and updated from prior note.  He  Ms. Bashir is a 54 year old female who comes in today for newly diagnosed gastric adenocarcinoma.  She tells me that she was being followed for a pancreatic duct dilatation and pancreatic cyst which was noted in November 2018.  Since May 2019 she started noticing some nausea and vomiting and occasional abdominal discomfort.    She had an ultrasound in August 2019 which was essentially unremarkable.  She had a repeat endoscopic ultrasound on 10/29/2019 which showed increase in size of the cyst as well as dilation of the main pancreatic duct.  FNA and fluid analysis showed mucinous epithelium.  She was referred to Dr. 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  Surprisingly there was no pancreatic ductal neoplasm seen but on the resected specimen stomach cancer was noted.  It was poorly differentiated adenocarcinoma with poorly cohesive/signet ring cell type with proximal gastric mucosal and serosal margins being positive.  There was lymphovascular and perineural invasion seen.  22 lymph nodes were sampled and all were benign.  It was a pT4aN0 lesion.  HER-2/christine FISH was not amplified.  Because of the surprising finding she was referred to medical oncology.  Prior to that she was seen by Dr. Mallory who recommended and arranged further restaging procedure with EUS and a PET scan but because of recent surgery, the timing of the PET scan was moved forward so that it is at least 6 weeks out from the surgery so that it does not interfere with postsurgical inflammation.      EUS on 3/3/2020 showed:     Postop changes consistent with  prior non-pylorus                        sparing Whipple resection. Some mucosal nodularity along                        the lesser curvature with scattered exudate. By                        ultrasound this appears to correspond to a staple or                        suture line. No gross evidence of neoplasm, however the                        previous neoplasm was infiltrative and may not be                        readily seen endoscopically.                        Non-specific loss of sonographic wall layers along the                        lesser curvature in this region adjacent to apparent                        suture. This is potentially consistent with                        post-operative edema, however it is not possible to                        completely exclude infiltrative tumor in this region.                        There was no target for needle aspiration (wall 3 mm in                        this region). Forceops biopsies obtained from this                        region and separately along the gastrojejunal                        anastomosis.                        - Two prominent left gastric artery lymph nodes. Needle                        aspiration performed and preliminary cytology suggested                        benign lymphoid material.                        - No liver lesions seen in the visualized portion                        (limited by post-op changes).     Left gastric lymph node fine-needle aspiration was negative for carcinoma.  Biopsy from the gastric jejunal anastomosis as well as lesser curvature of the stomach also did not show any malignancy.      She had a PET/CT on 3/13/2020 which showed soft tissue streaky density with increased FDG uptake adjacent to the pancreaticojejunostomy which could be neoplastic in origin.  There is mild increased FDG uptake in the gastric remnant.  Focal area of soft tissue thickening with fatty streakiness along medial laparotomy with  increased FDG uptake which likely is inflammatory.  Increased FDG uptake in thoracic esophagus which could be esophagitis.    I discussed with Dr Mallory and he also believes that we should go ahead with chemotherapy as the increased FDG activity around the pancreatojejunostomy is post surgical and likely inflammatory. As per him, she probably had a very small leak that formed a phlegmon. and it would be okay to start chemotherapy as planned and we can look at new scans in a couple of months.     She started on chemotherapy with 5FU, oxaliplatin, and Taxotere on 3/27/20. She was seen on 4/7/20 for evaluation of fevers. She was treated for possible pneumonia with Levaquin.      She was seen in clinic 4/13/20 for RUQ pain,CT and labs reassuring.     C#2 4/20/2020    C#3 5/4/2020    She then was admitted with neutropenic aspiration pneumonia from 5/21/2020 till 5/23/2020.  She was initially started on ceftriaxone azithromycin and Flagyl but then clindamycin was given because of concern for aspiration pneumonia.      Cycle #4 5/27/2020 ( with Onpro supoprt ).    Interval hx:  She was admitted to the hospital from 5/21/2020 till 5/23/2020 for aspiration pneumonia.  She was neutropenic. She was treated with antibiotics.  He was discharged and is now doing well.  She received cycle #4 of chemotherapy yesterday.    Overall doing well. She has cold sensitivity for 1 week but no neuropathy outside of cold sensitivity. Has some nausea and taking nausea meds. BMs have been OK. No more fevers. No SOB. No cough. She is done with the antibiotic course. No new swellings. Fell in the shower on 5/26/2020 and hurt her right little toe but XRAY was negative for fracture. Energy is fair. She takes tramadol every 6 hrs for LUQ pain which is under good control.    ECOG 1    ROS:  A comprehensive ROS was otherwise neg        I reviewed other history in epic as below.    Past Medical/Surgical History:  Past Medical History:   Diagnosis Date      Allergic rhinitis      Cancer (H)     stomach cancer     Depression      Lumbago      Migraine      Other chronic pain     low back     Sacroiliitis (H)     low back pain     Trochanteric bursitis     leg length discrrepancy, hip pain     She has history of left leg trauma when she was 4 years of age and since then she has had issues with the left leg length and developed arthritis in the left hip requiring left hip replacement in 2016.  She also had left sacroiliac joint fusion several years ago.      Past Surgical History:   Procedure Laterality Date     ARTHROPLASTY HIP Left 2016     BACK SURGERY      L4-5 decompression     C REPAIR DETACH RETINA,SCLERAL BUCKLE       CATARACT IOL, RT/LT       CHOLECYSTECTOMY N/A 1/28/2020    Procedure: Cholecystectomy;  Surgeon: Yandel Mallory MD;  [facility]     ESOPHAGOSCOPY, GASTROSCOPY, DUODENOSCOPY (EGD), COMBINED N/A 3/3/2020    Procedure: ESOPHAGOGASTRODUODENOSCOPY, WITH ENDOSCOPIC US (enoxaparin);  Surgeon: Bakari Plata MD;  [facility]     EYE SURGERY       IR CHEST PORT PLACEMENT > 5 YRS OF AGE  3/26/2020     OPEN REDUCTION INTERNAL FIXATION RODDING INTRAMEDULLARY FEMUR Left 12/23/2014    Procedure: OPEN REDUCTION INTERNAL FIXATION RODDING INTRAMEDULLARY FEMUR;  Surgeon: Arnol Santiago MD;  Location: [facility]     ORTHOPEDIC SURGERY       PICC DOUBLE LUMEN PLACEMENT Right 02/07/2020    5 fr double lumen 41 cm     REMOVE HARDWARE LOWER EXTREMITY Left 4/7/2015    Procedure: REMOVE HARDWARE LOWER EXTREMITY;  Surgeon: Arnol Santiago MD;  Location: [facility]     REMOVE HARDWARE RODDING INTRAMEDULLARY FEMUR Left 12/17/2015    Procedure: REMOVE HARDWARE RODDING INTRAMEDULLARY FEMUR;  Surgeon: Arnol Santiago MD;  [facility]     RESECT BONE LOWER EXTREMITY Left 12/23/2014    Procedure: RESECT BONE LOWER EXTREMITY;  Surgeon: Arnol Santiago MD;  [facility]     WHIPPLE PROCEDURE N/A 1/28/2020    Procedure: Open Whipple procedure and  Cholecystectomy;  Surgeon: Yandel Mallory MD;  [facility]     Cancer History:   As above    Allergies:  Allergies as of 05/28/2020 - Reviewed 05/28/2020   Allergen Reaction Noted     Gluten meal Palpitations 01/17/2020     Pregabalin  12/15/2015     Augmentin Rash 02/27/2020     Acetaminophen Nausea and Other (See Comments) 07/13/2017     Dust mite extract  12/15/2015     Gabapentin GI Disturbance 12/15/2015     Methadone Fatigue 12/15/2015     Mold  12/15/2015     Naprosyn [naproxen] GI Disturbance 12/15/2015     Oxycontin [oxycodone]  01/17/2020     Oxymetholone  01/17/2020     Seasonal allergies  12/15/2015     Topiramate Other (See Comments) 04/04/2016     Latex Rash 12/19/2014     Current Medications:  Current Outpatient Medications   Medication Sig Dispense Refill     amitriptyline (ELAVIL) 25 MG tablet Take 3 tablets by mouth At Bedtime       calcium carb 1250 mg, 500 mg Torres Martinez,/vitamin D 200 units (OSCAL WITH D) 500-200 MG-UNIT per tablet Take 1 tablet by mouth 3 times daily (with meals)       citalopram (CELEXA) 20 MG tablet Take 20 mg by mouth every morning        dexamethasone (DECADRON) 4 MG tablet Take 1 tablet (4mg) the day before, day of, and 2 days after chemotherapy 12 tablet 3     diclofenac (FLECTOR) 1.3 % patch Place 1 patch onto the skin daily as needed        famotidine (PEPCID) 20 MG tablet Take 1 tablet (20 mg) by mouth 2 times daily 60 tablet 3     fluticasone (FLONASE) 50 MCG/ACT nasal spray Spray 2 sprays into both nostrils daily as needed        hydrOXYzine (ATARAX) 10 MG tablet Take 10 mg by mouth 3 times daily as needed for itching       LORazepam (ATIVAN) 0.5 MG tablet Take 1 tablet (0.5 mg) by mouth every 4 hours as needed (Anxiety, Nausea/Vomiting or Sleep) 30 tablet 5     meclizine (ANTIVERT) 25 MG tablet Take 1 tablet (25 mg) by mouth 3 times daily as needed for dizziness 30 tablet 0     multivitamin, therapeutic with minerals (MULTI-VITAMIN) TABS Take 1 tablet by mouth daily        OLANZapine zydis (ZYPREXA) 5 MG ODT Take 1 tablet (5 mg) by mouth At Bedtime 30 tablet 0     ondansetron (ZOFRAN) 8 MG tablet Take 1 tablet (8 mg) by mouth every 8 hours as needed (Nausea/Vomiting) 10 tablet 7     ondansetron (ZOFRAN-ODT) 4 MG ODT tab Take 1 tablet (4 mg) by mouth every 6 hours as needed for nausea or vomiting 90 tablet 3     pantoprazole (PROTONIX) 40 MG EC tablet Take 1 tablet (40 mg) by mouth 2 times daily 60 tablet 1     senna-docusate (SENOKOT-S/PERICOLACE) 8.6-50 MG tablet Take 1 tablet by mouth daily as needed for constipation 30 tablet 0     simethicone 80 MG TABS Take 1 tablet by mouth every 6 hours as needed (bloating, gas, belching) 90 tablet 3     SUMAtriptan (IMITREX) 100 MG tablet Take 100 mg by mouth as needed       tiZANidine (ZANAFLEX) 4 MG tablet Take 1 tablet by mouth 2 times daily as needed       traMADol (ULTRAM) 50 MG tablet Take 1 tablet (50 mg) by mouth every 6 hours as needed for severe pain 60 tablet 0     acetaminophen (TYLENOL) 325 MG tablet Take 2 tablets (650 mg) by mouth every 6 hours as needed for pain (Patient not taking: Reported on 5/27/2020) 120 tablet 3      Family History:  Family History   Problem Relation Age of Onset     Heart Failure Mother 77     Anesthesia Reaction No family hx of      Deep Vein Thrombosis No family hx of    Paternal grandmother had uterine cancer at 97.  Patient has 1 son who is healthy.      Social History:  Social History     Socioeconomic History     Marital status:      Spouse name: Not on file     Number of children: Not on file     Years of education: Not on file     Highest education level: Not on file   Occupational History     Not on file   Social Needs     Financial resource strain: Not on file     Food insecurity     Worry: Not on file     Inability: Not on file     Transportation needs     Medical: Not on file     Non-medical: Not on file   Tobacco Use     Smoking status: Former Smoker     Packs/day: 0.50     Years:  25.00     Pack years: 12.50     Types: Cigarettes     Last attempt to quit: 2020     Years since quittin.4     Smokeless tobacco: Never Used   Substance and Sexual Activity     Alcohol use: No     Drug use: No     Sexual activity: Not on file   Lifestyle     Physical activity     Days per week: Not on file     Minutes per session: Not on file     Stress: Not on file   Relationships     Social connections     Talks on phone: Not on file     Gets together: Not on file     Attends Mandaeism service: Not on file     Active member of club or organization: Not on file     Attends meetings of clubs or organizations: Not on file     Relationship status: Not on file     Intimate partner violence     Fear of current or ex partner: Not on file     Emotionally abused: Not on file     Physically abused: Not on file     Forced sexual activity: Not on file   Other Topics Concern     Parent/sibling w/ CABG, MI or angioplasty before 65F 55M? Not Asked   Social History Narrative     Not on file     She used to smoke but quit on the date of her surgery.  Denies alcohol use.  Lives with her , son and 3 grandkids.    Physical Exam:  LMP 2014      Wt Readings from Last 4 Encounters:   20 83.6 kg (184 lb 3.2 oz)   20 83.7 kg (184 lb 9.6 oz)   20 80.5 kg (177 lb 6.4 oz)   20 81.8 kg (180 lb 4.8 oz)           Constitutional.  Does not seem to be in any acute distress.  Eyes.  No redness or discharge noted.  Respiratory.  Speaking in full sentences.  Breathing seems comfortable without any accessory use of muscles.    Skin.  Visualized his skin does not show any obvious rashes.  She has loss of his scalp hair.  Musculoskeletal.  Range of motion for visualized areas is intact.  Neurological.  Alert and oriented x3.  Psychiatric.  Mood, mentation and affect are normal.  Decision making capacity is intact.      The rest of a comprehensive physical examination is deferred due to Public Health  Emergency video visit restrictions.      Laboratory/Imaging Studies      Reviewed      ASSESSMENT/PLAN:    Incidentally found poorly differentiated signet ring type gastric adenocarcinoma, HER-2 negative.  This was found on Whipple surgery specimen as it was done for presumed pancreatic IPMN although no pancreatic neoplasm was found.  It was a pT4aN0 lesion with lymphovascular invasion and perineural invasion seen.  All 22 lymph nodes were negative.    EUS which was done on 3/3/2020 did not show any convincing evidence of malignancy.  Left gastric lymph node fine-needle aspiration was negative for carcinoma.  Biopsy from the gastric jejunal anastomosis as well as lesser curvature of the stomach also did not show any malignancy.      She had a PET/CT on 3/13/2020 which showed soft tissue streaky density with increased FDG uptake adjacent to the pancreaticojejunostomy which could be neoplastic in origin.  Other findings were likely reactive.  No other evidence of metastatic disease.    I discussed with Dr Mallory and he also believes that we should go ahead with chemotherapy as the increased FDG activity around the pancreatojejunostomy is post surgical and likely inflammatory. As per him, she probably had a very small leak that formed a phlegmon. and it would be okay to start chemotherapy as planned and we can look at new scans in a couple of months.     She started FLOT chemotherapy on 3/27/2020.  Cycle #4 was given on 5/27/2020.    Overall she is tolerating the chemotherapy fairly well but she had neutropenia and she was admitted for aspiration pneumonia at that time.  Due to this reason we will give the cycle #4 with onpro-support.    She will have repeat PET CT scan after completing the cycle and then we will decide about potentially continuing with chemotherapy versus surgery.    Neutropenic fever.  She had neutropenic fever with aspiration pneumonia and she was discharged on 5/23/2020.  She has completed the  antibiotic course.  She has recovered well.  With cycle #4, we are giving her growth factor support because of chemotherapy-induced neutropenia.    Nausea.  He had some nausea which is well controlled with antinausea medications.    Cold sensitivity.  From oxaliplatin.  This is lasting about a week but fortunately she does not have any neuropathy outside of cold sensitivity.  Continue to monitor.    Left upper quadrant pain.  Continue as needed tramadol.  She is followed with pain clinic/palliative care.     Anemia.  Hemoglobin is 9.1.  This is from the cancer, then the Whipple surgery as well as chemotherapy.  Continue to observe.    Right foot injury.  She fell a couple of days ago without any loss of consciousness.  She hit the right foot and has pain and swelling in the right greater toe.  Foot x-ray which was done yesterday did not show any fractures.  This is likely more of soft tissue injury.  She will continue symptomatic management with applying ice and hopefully this will get better.      Genetic testing.  This was negative.      RTC as scheduled    I answered all of her questions to her satisfaction.  She is agreeable and comfortable with the plan.      Magaly Pollard MD    Transition Care Management Services  No data recorded  No data recorded  No data recorded  Interactive contact date: 5/24/2020  Face-to-face visit date: 5/28/2020    Medical complexity decision making: High complexity (71550)

## 2020-05-29 ENCOUNTER — PATIENT OUTREACH (OUTPATIENT)
Dept: ONCOLOGY | Facility: CLINIC | Age: 55
End: 2020-05-29

## 2020-06-03 ENCOUNTER — HOSPITAL ENCOUNTER (OUTPATIENT)
Dept: PET IMAGING | Facility: CLINIC | Age: 55
Discharge: HOME | End: 2020-06-03
Attending: PHYSICIAN ASSISTANT | Admitting: PHYSICIAN ASSISTANT
Payer: MEDICARE

## 2020-06-03 DIAGNOSIS — C16.2 MALIGNANT NEOPLASM OF BODY OF STOMACH (H): ICD-10-CM

## 2020-06-03 DIAGNOSIS — C16.9 GASTRIC ADENOCARCINOMA (H): ICD-10-CM

## 2020-06-03 DIAGNOSIS — C16.9 SIGNET-RING CELL CARCINOMA OF STOMACH (H): ICD-10-CM

## 2020-06-03 PROCEDURE — 25000128 H RX IP 250 OP 636: Performed by: PHYSICIAN ASSISTANT

## 2020-06-03 PROCEDURE — A9552 F18 FDG: HCPCS | Performed by: PHYSICIAN ASSISTANT

## 2020-06-03 PROCEDURE — 34300033 ZZH RX 343: Performed by: PHYSICIAN ASSISTANT

## 2020-06-03 PROCEDURE — 78816 PET IMAGE W/CT FULL BODY: CPT | Mod: PS

## 2020-06-03 RX ORDER — IOPAMIDOL 755 MG/ML
45-135 INJECTION, SOLUTION INTRAVASCULAR ONCE
Status: COMPLETED | OUTPATIENT
Start: 2020-06-03 | End: 2020-06-03

## 2020-06-03 RX ADMIN — Medication 500 UNITS: at 09:09

## 2020-06-03 RX ADMIN — IOPAMIDOL 111 ML: 755 INJECTION, SOLUTION INTRAVENOUS at 09:07

## 2020-06-03 RX ADMIN — FLUDEOXYGLUCOSE F-18 10.8 MCI.: 500 INJECTION, SOLUTION INTRAVENOUS at 08:15

## 2020-06-04 ENCOUNTER — VIRTUAL VISIT (OUTPATIENT)
Dept: ONCOLOGY | Facility: CLINIC | Age: 55
End: 2020-06-04
Attending: INTERNAL MEDICINE
Payer: MEDICARE

## 2020-06-04 DIAGNOSIS — C16.9 GASTRIC ADENOCARCINOMA (H): Primary | ICD-10-CM

## 2020-06-04 PROCEDURE — 99215 OFFICE O/P EST HI 40 MIN: CPT | Mod: 95 | Performed by: INTERNAL MEDICINE

## 2020-06-04 PROCEDURE — 40001009 ZZH VIDEO/TELEPHONE VISIT; NO CHARGE

## 2020-06-04 NOTE — PROGRESS NOTES
"Nathalie Bashir is a 54 year old female who is being evaluated via a billable video visit.      The patient has been notified of following:     \"This video visit will be conducted via a call between you and your physician/provider. We have found that certain health care needs can be provided without the need for an in-person physical exam.  This service lets us provide the care you need with a video conversation.  If a prescription is necessary we can send it directly to your pharmacy.  If lab work is needed we can place an order for that and you can then stop by our lab to have the test done at a later time.    Video visits are billed at different rates depending on your insurance coverage.  Please reach out to your insurance provider with any questions.    If during the course of the call the physician/provider feels a video visit is not appropriate, you will not be charged for this service.\"    Patient has given verbal consent for Video visit? Yes    How would you like to obtain your AVS? [facility]    Patient would like the video invitation sent by: Text to cell phone: [phone]    Will anyone else be joining your video visit? No       Vitals done last week.    Maida Booth CMA        Video-Visit Details    Type of service:  Video Visit    Video Start Time: 2:43 PM  Video End Time: 2:56 PM    Originating Location (pt. Location): Home    Distant Location (provider location):  [facility]     Platform used for Video Visit: Miguel A Pollard MD        "

## 2020-06-04 NOTE — LETTER
"    6/4/2020         RE: Nathalie Bashir  [address] [facility] [address]        Dear Colleague,    Thank you for referring your patient, Nathalie Bashir, to the [facility]. Please see a copy of my visit note below.    Nathalie Bashir is a 54 year old female who is being evaluated via a billable video visit.      The patient has been notified of following:     \"This video visit will be conducted via a call between you and your physician/provider. We have found that certain health care needs can be provided without the need for an in-person physical exam.  This service lets us provide the care you need with a video conversation.  If a prescription is necessary we can send it directly to your pharmacy.  If lab work is needed we can place an order for that and you can then stop by our lab to have the test done at a later time.    Video visits are billed at different rates depending on your insurance coverage.  Please reach out to your insurance provider with any questions.    If during the course of the call the physician/provider feels a video visit is not appropriate, you will not be charged for this service.\"    Patient has given verbal consent for Video visit? Yes    How would you like to obtain your AVS? MyChart    Patient would like the video invitation sent by: Text to cell phone: [phone]    Will anyone else be joining your video visit? No       Vitals done last week.    Maida Booth CMA        Video-Visit Details    Type of service:  Video Visit    Video Start Time: 2:43 PM  Video End Time: 2:56 PM    Originating Location (pt. Location): Home    Distant Location (provider location):  [facility]     Platform used for Video Visit: Miguel A Pollard MD          Oncology follow up visit:  Date on this visit: 6/4/2020     Nathalie Bashir  is referred by Dr.Sara Gonzalez for an oncology consultation. She requires evaluation for new diagnosis of Gastric " cancer.    Primary Physician: Marianne Gonzalez     History Of Present Illness:    Please see my previous note for details.  I have copied and updated from prior note.    Ms. Bashir is a 54 year old female who comes in today for newly diagnosed gastric adenocarcinoma.  She tells me that she was being followed for a pancreatic duct dilatation and pancreatic cyst which was noted in November 2018.  Since May 2019 she started noticing some nausea and vomiting and occasional abdominal discomfort.    She had an ultrasound in August 2019 which was essentially unremarkable.  She had a repeat endoscopic ultrasound on 10/29/2019 which showed increase in size of the cyst as well as dilation of the main pancreatic duct.  FNA and fluid analysis showed mucinous epithelium.  She was referred to Dr. 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  Surprisingly there was no pancreatic ductal neoplasm seen but on the resected specimen stomach cancer was noted.  It was poorly differentiated adenocarcinoma with poorly cohesive/signet ring cell type with proximal gastric mucosal and serosal margins being positive.  There was lymphovascular and perineural invasion seen.  22 lymph nodes were sampled and all were benign.  It was a pT4aN0 lesion.  HER-2/christine FISH was not amplified.  Because of the surprising finding she was referred to medical oncology.  Prior to that she was seen by Dr. Mallory who recommended and arranged further restaging procedure with EUS and a PET scan but because of recent surgery, the timing of the PET scan was moved forward so that it is at least 6 weeks out from the surgery so that it does not interfere with postsurgical inflammation.      EUS on 3/3/2020 showed:     Postop changes consistent with prior non-pylorus                        sparing Whipple resection. Some mucosal nodularity along                        the lesser curvature with scattered exudate. By                         ultrasound this appears to correspond to a staple or                        suture line. No gross evidence of neoplasm, however the                        previous neoplasm was infiltrative and may not be                        readily seen endoscopically.                        Non-specific loss of sonographic wall layers along the                        lesser curvature in this region adjacent to apparent                        suture. This is potentially consistent with                        post-operative edema, however it is not possible to                        completely exclude infiltrative tumor in this region.                        There was no target for needle aspiration (wall 3 mm in                        this region). Forceops biopsies obtained from this                        region and separately along the gastrojejunal                        anastomosis.                        - Two prominent left gastric artery lymph nodes. Needle                        aspiration performed and preliminary cytology suggested                        benign lymphoid material.                        - No liver lesions seen in the visualized portion                        (limited by post-op changes).     Left gastric lymph node fine-needle aspiration was negative for carcinoma.  Biopsy from the gastric jejunal anastomosis as well as lesser curvature of the stomach also did not show any malignancy.      She had a PET/CT on 3/13/2020 which showed soft tissue streaky density with increased FDG uptake adjacent to the pancreaticojejunostomy which could be neoplastic in origin.  There is mild increased FDG uptake in the gastric remnant.  Focal area of soft tissue thickening with fatty streakiness along medial laparotomy with increased FDG uptake which likely is inflammatory.  Increased FDG uptake in thoracic esophagus which could be esophagitis.    I discussed with Dr Mallory and he also believes that we should go ahead  with chemotherapy as the increased FDG activity around the pancreatojejunostomy is post surgical and likely inflammatory. As per him, she probably had a very small leak that formed a phlegmon. and it would be okay to start chemotherapy as planned and we can look at new scans in a couple of months.     She started on chemotherapy with 5FU, oxaliplatin, and Taxotere on 3/27/20. She was seen on 4/7/20 for evaluation of fevers. She was treated for possible pneumonia with Levaquin.      She was seen in clinic 4/13/20 for RUQ pain,CT and labs reassuring.     C#2 4/20/2020    C#3 5/4/2020    She then was admitted with neutropenic aspiration pneumonia from 5/21/2020 till 5/23/2020.  She was initially started on ceftriaxone azithromycin and Flagyl but then clindamycin was given because of concern for aspiration pneumonia.      Cycle #4 5/27/2020 ( with Onpro supoprt ).    Repeat PET/CT on 6/3/2020 shows mild residual uptake at the site of gastrojejunal anastomosis which likely is physiologic.  There is almost resolution of soft tissue nodule next to the pancreaticoduodenal anastomosis without FDG uptake.  There is focal increased uptake in the left posterior acetabulum which could be artifact.    Plan for cycle #5 on 6/11/2020.    Interval hx:  This is a video visit.  She had some nausea and occasional vomiting with this chemotherapy.  She took Zofran which helped.  She also was having diarrhea and she has been taking 1-2 Imodium daily.  Overall energy is a little down but she still remains decently functional.  Denies any fevers or infection.  Pain is under decent control with tramadol every 6 hours.  She notices some tingling and numbness in her feet and she believes that the neuropathy in the hand is getting better.  No bleeding.  No shortness of breath.  No mouth sores.    ECOG 1-2    ROS:  Rest of the comprehensive review of the system was essentially unremarkable.          I reviewed other history in epic as  below.    Past Medical/Surgical History:  Past Medical History:   Diagnosis Date     Allergic rhinitis      Cancer (H)     stomach cancer     Depression      Lumbago      Migraine      Other chronic pain     low back     Sacroiliitis (H)     low back pain     Trochanteric bursitis     leg length discrrepancy, hip pain     She has history of left leg trauma when she was 4 years of age and since then she has had issues with the left leg length and developed arthritis in the left hip requiring left hip replacement in 2016.  She also had left sacroiliac joint fusion several years ago.      Past Surgical History:   Procedure Laterality Date     ARTHROPLASTY HIP Left 2016     BACK SURGERY      L4-5 decompression     C REPAIR DETACH RETINA,SCLERAL BUCKLE       CATARACT IOL, RT/LT       CHOLECYSTECTOMY N/A 1/28/2020    Procedure: Cholecystectomy;  Surgeon: Yandel Mallory MD;  Location:  OR     ESOPHAGOSCOPY, GASTROSCOPY, DUODENOSCOPY (EGD), COMBINED N/A 3/3/2020    Procedure: ESOPHAGOGASTRODUODENOSCOPY, WITH ENDOSCOPIC US (enoxaparin);  Surgeon: Bakari Plata MD;  Location:  GI     EYE SURGERY       IR CHEST PORT PLACEMENT > 5 YRS OF AGE  3/26/2020     OPEN REDUCTION INTERNAL FIXATION RODDING INTRAMEDULLARY FEMUR Left 12/23/2014    Procedure: OPEN REDUCTION INTERNAL FIXATION RODDING INTRAMEDULLARY FEMUR;  Surgeon: Arnol Santiago MD;  Location:  OR     ORTHOPEDIC SURGERY       PICC DOUBLE LUMEN PLACEMENT Right 02/07/2020    5 fr double lumen 41 cm     REMOVE HARDWARE LOWER EXTREMITY Left 4/7/2015    Procedure: REMOVE HARDWARE LOWER EXTREMITY;  Surgeon: Arnol Santiago MD;  Location: [facility] OR     REMOVE HARDWARE RODDING INTRAMEDULLARY FEMUR Left 12/17/2015    Procedure: REMOVE HARDWARE RODDING INTRAMEDULLARY FEMUR;  Surgeon: Arnol Santiago MD;  Location: [facility] OR     RESECT BONE LOWER EXTREMITY Left 12/23/2014    Procedure: RESECT BONE LOWER EXTREMITY;  Surgeon: Arnol Santiago MD;  Location:  OR      WHIPPLE PROCEDURE N/A 1/28/2020    Procedure: Open Whipple procedure and Cholecystectomy;  Surgeon: Yandel Mallory MD;  Location: [facility] OR     Cancer History:   As above    Allergies:  Allergies as of 06/04/2020 - Reviewed 06/04/2020   Allergen Reaction Noted     Gluten meal Palpitations 01/17/2020     Augmentin Rash 02/27/2020     Oxycontin [oxycodone]  01/17/2020     Pregabalin  12/15/2015     Topiramate  04/04/2016     Acetaminophen Nausea 07/13/2017     Dust mite extract  12/15/2015     Gabapentin Dizziness and GI Disturbance 12/15/2015     Latex Rash 12/19/2014     Methadone Fatigue 12/15/2015     Mold  12/15/2015     Naprosyn [naproxen] Dizziness and GI Disturbance 12/15/2015     Oxymetholone  01/17/2020     Seasonal allergies  12/15/2015     Current Medications:  Current Outpatient Medications   Medication Sig Dispense Refill     amitriptyline (ELAVIL) 25 MG tablet Take 3 tablets by mouth At Bedtime       calcium carb 1250 mg, 500 mg [place],/vitamin D 200 units (OSCAL WITH D) 500-200 MG-UNIT per tablet Take 1 tablet by mouth 3 times daily (with meals)       citalopram (CELEXA) 20 MG tablet Take 20 mg by mouth every morning        dexamethasone (DECADRON) 4 MG tablet Take 1 tablet (4mg) the day before, day of, and 2 days after chemotherapy 12 tablet 3     diclofenac (FLECTOR) 1.3 % patch Place 1 patch onto the skin daily as needed        famotidine (PEPCID) 20 MG tablet Take 1 tablet (20 mg) by mouth 2 times daily 60 tablet 3     fluticasone (FLONASE) 50 MCG/ACT nasal spray Spray 2 sprays into both nostrils daily as needed        hydrOXYzine (ATARAX) 10 MG tablet Take 10 mg by mouth 3 times daily as needed for itching       LORazepam (ATIVAN) 0.5 MG tablet Take 1 tablet (0.5 mg) by mouth every 4 hours as needed (Anxiety, Nausea/Vomiting or Sleep) 30 tablet 5     meclizine (ANTIVERT) 25 MG tablet Take 1 tablet (25 mg) by mouth 3 times daily as needed for dizziness 30 tablet 0     multivitamin, therapeutic with  minerals (MULTI-VITAMIN) TABS Take 1 tablet by mouth daily       OLANZapine zydis (ZYPREXA) 5 MG ODT Take 1 tablet (5 mg) by mouth At Bedtime 30 tablet 0     ondansetron (ZOFRAN) 8 MG tablet Take 1 tablet (8 mg) by mouth every 8 hours as needed (Nausea/Vomiting) 10 tablet 7     ondansetron (ZOFRAN-ODT) 4 MG ODT tab Take 1 tablet (4 mg) by mouth every 6 hours as needed for nausea or vomiting 90 tablet 3     pantoprazole (PROTONIX) 40 MG EC tablet Take 1 tablet (40 mg) by mouth 2 times daily 60 tablet 1     senna-docusate (SENOKOT-S/PERICOLACE) 8.6-50 MG tablet Take 1 tablet by mouth daily as needed for constipation 30 tablet 0     simethicone 80 MG TABS Take 1 tablet by mouth every 6 hours as needed (bloating, gas, belching) 90 tablet 3     SUMAtriptan (IMITREX) 100 MG tablet Take 100 mg by mouth as needed       tiZANidine (ZANAFLEX) 4 MG tablet Take 1 tablet by mouth 2 times daily as needed       traMADol (ULTRAM) 50 MG tablet Take 1 tablet (50 mg) by mouth every 6 hours as needed for severe pain 60 tablet 0      Family History:  Family History   Problem Relation Age of Onset     Heart Failure Mother 77     Anesthesia Reaction No family hx of      Deep Vein Thrombosis No family hx of    Paternal grandmother had uterine cancer at 97.  Patient has 1 son who is healthy.      Social History:  Social History     Socioeconomic History     Marital status:      Spouse name: Not on file     Number of children: Not on file     Years of education: Not on file     Highest education level: Not on file   Occupational History     Not on file   Social Needs     Financial resource strain: Not on file     Food insecurity     Worry: Not on file     Inability: Not on file     Transportation needs     Medical: Not on file     Non-medical: Not on file   Tobacco Use     Smoking status: Former Smoker     Packs/day: 0.50     Years: 25.00     Pack years: 12.50     Types: Cigarettes     Last attempt to quit: 1/1/2020     Years since  quittin.4     Smokeless tobacco: Never Used   Substance and Sexual Activity     Alcohol use: No     Drug use: No     Sexual activity: Not on file   Lifestyle     Physical activity     Days per week: Not on file     Minutes per session: Not on file     Stress: Not on file   Relationships     Social connections     Talks on phone: Not on file     Gets together: Not on file     Attends Latter day service: Not on file     Active member of club or organization: Not on file     Attends meetings of clubs or organizations: Not on file     Relationship status: Not on file     Intimate partner violence     Fear of current or ex partner: Not on file     Emotionally abused: Not on file     Physically abused: Not on file     Forced sexual activity: Not on file   Other Topics Concern     Parent/sibling w/ CABG, MI or angioplasty before 65F 55M? Not Asked   Social History Narrative     Not on file     She used to smoke but quit on the date of her surgery.  Denies alcohol use.  Lives with her , son and 3 grandkids.    Physical Exam:  LMP 2014      Wt Readings from Last 4 Encounters:   20 83.6 kg (184 lb 3.2 oz)   20 83.7 kg (184 lb 9.6 oz)   20 80.5 kg (177 lb 6.4 oz)   20 81.8 kg (180 lb 4.8 oz)           Constitutional.  Looks well and in no apparent distress.   Eyes.  Without eye redness or apparent jaundice.   Respiratory.  Non labored breathing. Speaking in full sentences.    Skin.  No concerning skin rashes on the skin visualized.   Neurological.  Is alert and oriented.  Psychiatric.  Mood and affect seem appropriate.      The rest of a comprehensive physical examination is deferred due to Public Health Emergency video visit restrictions.    Laboratory/Imaging Studies      Reviewed    Combined Report of:    PET and CT on  6/3/2020 9:45 AM :     1. PET of the neck, chest, abdomen, and pelvis.  2. PET CT Fusion for Attenuation Correction and Anatomical  Localization:    3. Diagnostic CT  scan of the chest, abdomen, and pelvis with  intravenous contrast for interpretation.  3. CT of the chest, abdomen and pelvis obtained for diagnostic  interpretation.  4. 3D MIP and PET-CT fused images were processed on an independent  workstation and archived to PACS and reviewed by a radiologist.     Technique:     1. PET: The patient received 10.8 mCi of F-18-FDG; the serum glucose  was 114 prior to administration, body weight was 83.6 kg. Images were  evaluated in the axial, sagittal, and coronal planes as well as the  rotational whole body MIP. Images were acquired from the Vertex to the  Feet.     UPTAKE WAS MEASURED AT 60 MINUTES.      BACKGROUND:  Liver SUV max= 5.01,   Aorta Blood SUV Max: 2.57.      2. CT: Volumetric acquisition for clinical interpretation of the  chest, abdomen, and pelvis acquired at 3 mm sections . The chest,  abdomen, and pelvis were evaluated at 5 mm sections in bone, soft  tissue, and lung windows.       The patient received 112 cc. Of Isovue 370 intravenously for the  examination.    --     3. 3D MIP and PET-CT fused images were processed on an independent  workstation and archived to PACS and reviewed by a radiologist.     INDICATION: poorly differentiated signet ring type gastric  adenocarcinoma,; Signet-ring cell carcinoma of stomach (H); Gastric  adenocarcinoma (H); Malignant neoplasm of body of stomach (H)     ADDITIONAL INFORMATION OBTAINED FROM EMR: on chemo     COMPARISON: 5/21/2020, 4/13/2020     FINDINGS:      HEAD/NECK:  Stable few bilateral lung nodules. Slightly increased uptake in the  bilateral palatine tonsils with SUV max up to 6.36, likely  inflammatory. Patent main neck vessels. No suspicious lymphadenopathy.  The salivary glands and thyroid are normal. Paranasal sinuses and  mastoid air cells are clear.     CHEST:  No suspicious lung nodules. Stable atelectasis of the left lung base.  No suspicious lymphadenopathy. Heart is normal. Increased uptake in  the  esophagus, likely inflammatory. Right chest port with distal tip  at the cavoatrial junction.     ABDOMEN AND PELVIS:  Liver, spleen, kidneys, adrenal glands are normal. Postsurgical  changes of cholecystectomy. Postsurgical changes of Whipple.  Pancreatic duct dilation. There is interval decrease of soft tissue  next to the pancreaticoduodenal anastomosis without significant  residual uptake. Postsurgical changes of partial gastrectomy with  residual FDG uptake close to the gastrojejunal anastomosis with SUV  max of 7.57. No suspicious residual lymphadenopathy. Circumferential  thickening of the urinary bladder. No suspicious abdominal  lymphadenopathy. Patent main abdominal vessels.     LOWER EXTREMITIES:   No abnormal masses or hypermetabolic lesions.     BONES:   There are no suspicious lytic or blastic osseous lesions.  There is no  abnormal FDG uptake in the skeleton. Diffuse bone marrow activation,  likely related to treatment. Left total hip arthroplasty. Diffuse bone  marrow activation. Left sacroiliac arthrodesis. Focal increased uptake  in the left posterior acetabulum with SUV max of 7.94.                                                                         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 Residual uptake at the site of gastrojejunal anastomosis, could be  physiological, however consider endoscopy for confirmation.  2. Almost resolved soft tissue nodule next to the pancreaticoduodenal  anastomosis without FDG uptake.  3. Focal increased uptake in the left posterior acetabulum may be  artifact. Attention on follow up.      ASSESSMENT/PLAN:    Incidentally found poorly differentiated signet ring type gastric adenocarcinoma, HER-2 negative.  This was found on Whipple surgery specimen as it was done for presumed pancreatic IPMN although no pancreatic neoplasm was found.  It was a pT4aN0 lesion with lymphovascular invasion and  perineural invasion seen.  All 22 lymph nodes were negative.    EUS which was done on 3/3/2020 did not show any convincing evidence of malignancy.  Left gastric lymph node fine-needle aspiration was negative for carcinoma.  Biopsy from the gastric jejunal anastomosis as well as lesser curvature of the stomach also did not show any malignancy.      She had a PET/CT on 3/13/2020 which showed soft tissue streaky density with increased FDG uptake adjacent to the pancreaticojejunostomy which could be neoplastic in origin.  Other findings were likely reactive.  No other evidence of metastatic disease.    I discussed with Dr Mallory and he also believes that we should go ahead with chemotherapy as the increased FDG activity around the pancreatojejunostomy is post surgical and likely inflammatory. As per him, she probably had a very small leak that formed a phlegmon. and it would be okay to start chemotherapy as planned and we can look at new scans in a couple of months.     She started FLOT chemotherapy on 3/27/2020.  Cycle #4 was given on 5/27/2020 with growth factor support.    Repeat PET/CT on 6/3/2020 shows mild residual uptake at the site of gastrojejunal anastomosis which likely is physiologic.  There is almost resolution of soft tissue nodule next to the pancreaticoduodenal anastomosis without FDG uptake.  There is focal increased uptake in the left posterior acetabulum which could be artifact.    Overall she is doing fairly well.  I discussed this with case with Dr. Mallory and I discussed with her in detail as well.  My recommendation would be to continue 4 more cycles of chemotherapy to complete all chemotherapy in the neoadjuvant setting as a substantial number of patients are unable to get adjuvant chemotherapy after surgery.  We discussed the pros and cons of this approach.  We also discussed that there is a chance that with this approach she might not remain a surgical candidate but I believe that giving her  the chance of complete treatment with full dose chemotherapy as well as surgery offers the best chance of a cure so because of this reason I recommend completing the chemotherapy before surgery.  We will plan on giving her 4 more cycles and then repeat PET scan.  Hopefully after that she will undergo surgery.    Neutropenic fever.  She had neutropenic fever with aspiration pneumonia and she was discharged on 5/23/2020.  She has now recovered.  We will continue Neulasta support.     Nausea and vomiting.  Continue antiemetics as needed.    Diarrhea.  I discussed that she should use Imodium more liberally to control diarrhea.    Neuropathy/cold sensitivity.  From oxaliplatin as well as docetaxel.  Right now it seems that the neuropathy is improving.  We will reassess prior to next chemotherapy to see if any dose adjustments are needed.      Left upper quadrant pain.  Continue as needed tramadol.  She is followed with pain clinic/palliative care.     Anemia.  Anemia is from a combination of cancer, surgery as well as chemotherapy.  Continue to monitor.      We did not address the following today.    Right foot injury.  She fell last week without any loss of consciousness.  She hit the right foot and has pain and swelling in the right greater toe.  Foot x-ray which was done yesterday did not show any fractures.  This is likely more of soft tissue injury.  She will continue symptomatic management with applying ice and hopefully this will get better.      Genetic testing.  This was negative.      RTC as scheduled    I answered all of her questions to her satisfaction.  She is agreeable and comfortable with the plan.      Magaly Pollard MD    Again, thank you for allowing me to participate in the care of your patient.        Sincerely,        Magaly Pollard MD

## 2020-06-08 ENCOUNTER — VIRTUAL VISIT (OUTPATIENT)
Dept: ONCOLOGY | Facility: CLINIC | Age: 55
End: 2020-06-08
Attending: PHYSICIAN ASSISTANT
Payer: MEDICARE

## 2020-06-08 DIAGNOSIS — C16.9 GASTRIC ADENOCARCINOMA (H): Primary | ICD-10-CM

## 2020-06-08 PROCEDURE — 97802 MEDICAL NUTRITION INDIV IN: CPT | Mod: TEL,ZF | Performed by: DIETITIAN, REGISTERED

## 2020-06-08 NOTE — LETTER
"    6/8/2020         RE: Nathalie Bashir  [address] [facility] [address]        Dear Colleague,    Thank you for referring your patient, Nathalie Bashir, to the [facility]. Please see a copy of my visit note below.    Nathalie Bashir is a 54 year old female who is being evaluated via a billable telephone visit.      The patient has been notified of following:     \"This telephone visit will be conducted via a call between you and your physician/provider. We have found that certain health care needs can be provided without the need for a physical exam.  This service lets us provide the care you need with a short phone conversation.  If a prescription is necessary we can send it directly to your pharmacy.  If lab work is needed we can place an order for that and you can then stop by our lab to have the test done at a later time.    Telephone visits are billed at different rates depending on your insurance coverage. During this emergency period, for some insurers they may be billed the same as an in-person visit.  Please reach out to your insurance provider with any questions.    If during the course of the call the physician/provider feels a telephone visit is not appropriate, you will not be charged for this service.\"    Patient has given verbal consent for Telephone visit?  Yes    CLINICAL NUTRITION SERVICES - ASSESSMENT NOTE    Nathalie Bashir 54 year old referred for MNT related to gastric cancer    Time Spent: 30 minutes  Visit Type: phone  Referring Physician: Micki 5/18    Nutrition Prescription   Recommendations Suggested by Registered Dietitian (RD):   1. 2000kcal, 85g protein/day  2. Small frequent meals   Malnutrition: does not meet criteria at this time     NUTRITION HISTORY  Factors affecting nutrition intake include:decreased appetite, diarrhea, nausea and vomiting  Current diet: general, Gluten free  Current appetite/intake: fair  Chemotherapy: Taxotere, 5FU, Oxaliplatin   Surgical " "history: Whipple 1/28/20    Nathalie tells me that her appetite and intake have 'periodically not been good'.  She had struggled with some diarrhea, nausea and vomiting associated with her chemo cycles.    She has been taking imodium as needed which has improved her diarrhea. She denies steatorrhea.   She is focused on eating smaller, more frequent meals which has been helpful for her.    She has been drinking [place] Breakfast essentials, one daily.   She eats gluten free to help reduce migraines.       Diet Recall  Breakfast Oatmeal or dry cereal with 2% milk OR 2 eggs with cheese and toast   Lunch Grazes on cheese, nuts and fruit   Dinner 3 oz lean chicken, scallops or steak with cooked vegetables   Snacks Nachos    Beverages 8 cups water/day     ANTHROPOMETRICS  Height: 69\"  Weight: 184 lbs/83kg  BMI: 27  Weight Status:  Overweight BMI 25-29.9  IBW: 145 lbs (126%)  Weight History: stable  Wt Readings from Last 10 Encounters:   05/27/20 83.6 kg (184 lb 3.2 oz)   05/23/20 83.7 kg (184 lb 9.6 oz)   05/18/20 80.5 kg (177 lb 6.4 oz)   05/04/20 81.8 kg (180 lb 4.8 oz)   04/20/20 82.4 kg (181 lb 9.6 oz)   04/13/20 84.1 kg (185 lb 4.8 oz)   03/31/20 82.3 kg (181 lb 6.4 oz)   03/27/20 84.1 kg (185 lb 4.8 oz)   03/26/20 81.6 kg (180 lb)   03/19/20 82.2 kg (181 lb 3.2 oz)     Dosing Weight: 70kg    Medications/vitamins/minerals/herbals:   Reviewed    Labs:   Labs reviewed    NUTRITION FOCUSED PHYSICAL ASSESSMENT FOR DIAGNOSING MALNUTRITION:  Observed:  Not observed due to Covid 19 pandemic (phone call only)    ASSESSED NUTRITION NEEDS:  Estimated Energy Needs: 2100 kcals (30 Kcal/Kg)  Justification: maintenance  Estimated Protein Needs: 85 grams protein (1.2g pro/Kg)  Justification: increased needs with chemotherapy  Estimated Fluid Needs: 2000  mL   Justification: maintenance    MALNUTRITION:  % Weight Loss:  None noted  % Intake:  Decreased intake does not meet criteria for malnutrition   Subcutaneous Fat Loss:  None " observed  Muscle Loss:  None observed  Fluid Retention:  None noted    Malnutrition Diagnosis: Patient does not meet two of the above criteria necessary for diagnosing malnutrition but is at risk.    NUTRITION DIAGNOSIS:  Inadequate oral intake related to decreased appetite with nausea, some vomiting as evidenced by pt report taking smaller portions or skipping meals.     INTERVENTIONS  Provided written & verbal education:   - Discussed ways to maximize and fortify foods with calories and protein via small frequent meals.    - Advised pt to aim for at least 2000kcal and 85g protein daily.   - Reviewed common barriers to eating with treatment and as treatment progresses. Discussed ways to cope with diarrhea, nausea and vomiting.   - Reviewed ONS options (Premire Protein, CIB etc). Encouraged utilizing these ONS in home made shakes/smoothies to prevent flavor fatigue.  Encouraged pt to start consuming 2 ONS or home made shakes/smoothies daily.     - Reviewed hydration.  Encouraged to aim for at least 8-10 cups non-caffeine containing beverages/day.  Also discussed protein water options (premire protein).      Provided pt with corresponding education materials/handouts on:  Sources of Protein.     Pt verbalize understanding of materials provided during consult.   Patient Understanding: Excellent  Expected Compliance: Excellent    Goals  1.  Aim for 5-6 small frequent meals  2.  Aim for 2000kcal and 85g protein/day  3. Aim for 10 cups non-caffeine containing beverages daily   4. Weight maintenance       Follow-Up Plans: Pt has RD contact information for questions.      Omaira Hart RDN, LD        Again, thank you for allowing me to participate in the care of your patient.        Sincerely,        Omaira Hart RD

## 2020-06-09 NOTE — PROGRESS NOTES
"Nathalie Bashir is a 54 year old female who is being evaluated via a billable telephone visit.      The patient has been notified of following:     \"This telephone visit will be conducted via a call between you and your physician/provider. We have found that certain health care needs can be provided without the need for a physical exam.  This service lets us provide the care you need with a short phone conversation.  If a prescription is necessary we can send it directly to your pharmacy.  If lab work is needed we can place an order for that and you can then stop by our lab to have the test done at a later time.    Telephone visits are billed at different rates depending on your insurance coverage. During this emergency period, for some insurers they may be billed the same as an in-person visit.  Please reach out to your insurance provider with any questions.    If during the course of the call the physician/provider feels a telephone visit is not appropriate, you will not be charged for this service.\"    Patient has given verbal consent for Telephone visit?  Yes    CLINICAL NUTRITION SERVICES - ASSESSMENT NOTE    Nathalie Bashir 54 year old referred for MNT related to gastric cancer    Time Spent: 30 minutes  Visit Type: phone  Referring Physician: Micki 5/18    Nutrition Prescription   Recommendations Suggested by Registered Dietitian (RD):   1. 2000kcal, 85g protein/day  2. Small frequent meals   Malnutrition: does not meet criteria at this time     NUTRITION HISTORY  Factors affecting nutrition intake include:decreased appetite, diarrhea, nausea and vomiting  Current diet: general, Gluten free  Current appetite/intake: fair  Chemotherapy: Taxotere, 5FU, Oxaliplatin   Surgical history: Whipple 1/28/20    Nathalie tells me that her appetite and intake have 'periodically not been good'.  She had struggled with some diarrhea, nausea and vomiting associated with her chemo cycles.    She has been taking imodium as needed " "which has improved her diarrhea. She denies steatorrhea.   She is focused on eating smaller, more frequent meals which has been helpful for her.    She has been drinking [place] Breakfast essentials, one daily.   She eats gluten free to help reduce migraines.       Diet Recall  Breakfast Oatmeal or dry cereal with 2% milk OR 2 eggs with cheese and toast   Lunch Grazes on cheese, nuts and fruit   Dinner 3 oz lean chicken, scallops or steak with cooked vegetables   Snacks Nachos    Beverages 8 cups water/day     ANTHROPOMETRICS  Height: 69\"  Weight: 184 lbs/83kg  BMI: 27  Weight Status:  Overweight BMI 25-29.9  IBW: 145 lbs (126%)  Weight History: stable  Wt Readings from Last 10 Encounters:   05/27/20 83.6 kg (184 lb 3.2 oz)   05/23/20 83.7 kg (184 lb 9.6 oz)   05/18/20 80.5 kg (177 lb 6.4 oz)   05/04/20 81.8 kg (180 lb 4.8 oz)   04/20/20 82.4 kg (181 lb 9.6 oz)   04/13/20 84.1 kg (185 lb 4.8 oz)   03/31/20 82.3 kg (181 lb 6.4 oz)   03/27/20 84.1 kg (185 lb 4.8 oz)   03/26/20 81.6 kg (180 lb)   03/19/20 82.2 kg (181 lb 3.2 oz)     Dosing Weight: 70kg    Medications/vitamins/minerals/herbals:   Reviewed    Labs:   Labs reviewed    NUTRITION FOCUSED PHYSICAL ASSESSMENT FOR DIAGNOSING MALNUTRITION:  Observed:  Not observed due to Covid 19 pandemic (phone call only)    ASSESSED NUTRITION NEEDS:  Estimated Energy Needs: 2100 kcals (30 Kcal/Kg)  Justification: maintenance  Estimated Protein Needs: 85 grams protein (1.2g pro/Kg)  Justification: increased needs with chemotherapy  Estimated Fluid Needs: 2000  mL   Justification: maintenance    MALNUTRITION:  % Weight Loss:  None noted  % Intake:  Decreased intake does not meet criteria for malnutrition   Subcutaneous Fat Loss:  None observed  Muscle Loss:  None observed  Fluid Retention:  None noted    Malnutrition Diagnosis: Patient does not meet two of the above criteria necessary for diagnosing malnutrition but is at risk.    NUTRITION DIAGNOSIS:  Inadequate oral intake " related to decreased appetite with nausea, some vomiting as evidenced by pt report taking smaller portions or skipping meals.     INTERVENTIONS  Provided written & verbal education:   - Discussed ways to maximize and fortify foods with calories and protein via small frequent meals.    - Advised pt to aim for at least 2000kcal and 85g protein daily.   - Reviewed common barriers to eating with treatment and as treatment progresses. Discussed ways to cope with diarrhea, nausea and vomiting.   - Reviewed ONS options (Premire Protein, CIB etc). Encouraged utilizing these ONS in home made shakes/smoothies to prevent flavor fatigue.  Encouraged pt to start consuming 2 ONS or home made shakes/smoothies daily.     - Reviewed hydration.  Encouraged to aim for at least 8-10 cups non-caffeine containing beverages/day.  Also discussed protein water options (premire protein).      Provided pt with corresponding education materials/handouts on:  Sources of Protein.     Pt verbalize understanding of materials provided during consult.   Patient Understanding: Excellent  Expected Compliance: Excellent    Goals  1.  Aim for 5-6 small frequent meals  2.  Aim for 2000kcal and 85g protein/day  3. Aim for 10 cups non-caffeine containing beverages daily   4. Weight maintenance       Follow-Up Plans: Pt has RD contact information for questions.      Omaira Hart RDN, LD

## 2020-06-09 NOTE — PROGRESS NOTES
"Nathalie Bashir is a 54 year old female who is being evaluated via a billable video visit.      The patient has been notified of following:     \"This video visit will be conducted via a call between you and your physician/provider. We have found that certain health care needs can be provided without the need for an in-person physical exam.  This service lets us provide the care you need with a video conversation.  If a prescription is necessary we can send it directly to your pharmacy.  If lab work is needed we can place an order for that and you can then stop by our lab to have the test done at a later time.    Video visits are billed at different rates depending on your insurance coverage.  Please reach out to your insurance provider with any questions.    If during the course of the call the physician/provider feels a video visit is not appropriate, you will not be charged for this service.\"    Patient has given verbal consent for Video visit? Yes    How would you like to obtain your AVS? [facility]    Patient would like the video invitation sent by: Text to cell phone: [phone]    Will anyone else be joining your video visit? No      STEPHENIE Sanches      Palliative Care Outpatient Clinic Progress Note    Patient Name:  Nathalie Bashir  Primary Provider:  Marianne Gonzalez    Chief Complaint/Patient ID:   Nathalie Bashir is a 55yo F with gastric adenocarcinoma   - Found during Whipple 1/2020 for presumed IPMN - no pancreatic malignancy found, but had poorly differentiated adenoca   - Receiving neoadjuvant chemo (5FU, leucovorin, oxaliplatin, docetaxel) prior to planned curative intent surgery with Dr. Mohamud Mcgarry chronic neck/back pain previously on opioids managed by a local pain clinic, has been off these for a few months with good control    Last Palliative Care Appointment: 4/15/20     Interim History:  Nathalie Bashir 54 year old female returns to be seen by palliative care today.  Video visit performed due to " coronavirus.  She is alone today.    Since last visit, was admitted for pneumonia, seen by palliative care inpatient and started olanzapine 5 mg QHS for nausea during that visit.       She is continued to have left upper quadrant discomfort she describes as stomachache.  She feels she will have severe acid reflux, worse at night.  Feels like nothing has significantly calmed this.  Says every day is a little different.  Is taking olanzapine and Zofran which do help some nausea, but that feels different than the heartburn she is having.  No vomiting recently. She is taking twice a day Pepcid and a PPI.  Carafate did not help. Will take tramadol usually 2 or 3 times a day which helps some.  Has not been constipated, had loose stools yesterday that come and go.    Coping:  Feels she is otherwise been doing well, tries to keep upbeat attitude.    Social History:  Pertinent changes to social history/social situation since last visit: None  Lives with her , son, and 3 grandchildren  Retired, worked in special education  Advance Directive Status:  Has not completed  Social History     Tobacco Use     Smoking status: Former Smoker     Packs/day: 0.50     Years: 25.00     Pack years: 12.50     Types: Cigarettes     Last attempt to quit: 2020     Years since quittin.4     Smokeless tobacco: Never Used   Substance Use Topics     Alcohol use: No     Drug use: No         Allergies   Allergen Reactions     Gluten Meal Palpitations     Augmentin Rash     Oxycontin [Oxycodone]      Confusion, loopy, oxycodone is tolerated     Pregabalin      interfered with Cymbalta     Topiramate      Tongue numbness, taste alteration, irritable      Acetaminophen Nausea     Urine retention       [place] Extract      Gabapentin Dizziness and GI Disturbance     Latex Rash     Methadone Fatigue     Mold      Naprosyn [Naproxen] Dizziness and GI Disturbance     Oxymetholone      Seasonal Allergies      Current Outpatient Medications    Medication Sig Dispense Refill     amitriptyline (ELAVIL) 25 MG tablet Take 3 tablets by mouth At Bedtime       calcium carb 1250 mg, 500 mg [employer] [place],/vitamin D 200 units (OSCAL WITH D) 500-200 MG-UNIT per tablet Take 1 tablet by mouth 3 times daily (with meals)       citalopram (CELEXA) 20 MG tablet Take 20 mg by mouth every morning        dexamethasone (DECADRON) 4 MG tablet Take 1 tablet (4mg) the day before, day of, and 2 days after chemotherapy 12 tablet 3     diclofenac (FLECTOR) 1.3 % patch Place 1 patch onto the skin daily as needed        famotidine (PEPCID) 20 MG tablet Take 1 tablet (20 mg) by mouth 2 times daily 60 tablet 3     fluticasone (FLONASE) 50 MCG/ACT nasal spray Spray 2 sprays into both nostrils daily as needed        hydrOXYzine (ATARAX) 10 MG tablet Take 10 mg by mouth 3 times daily as needed for itching       LORazepam (ATIVAN) 0.5 MG tablet Take 1 tablet (0.5 mg) by mouth every 4 hours as needed (Anxiety, Nausea/Vomiting or Sleep) 30 tablet 5     meclizine (ANTIVERT) 25 MG tablet Take 1 tablet (25 mg) by mouth 3 times daily as needed for dizziness 30 tablet 0     multivitamin, therapeutic with minerals (MULTI-VITAMIN) TABS Take 1 tablet by mouth daily       OLANZapine zydis (ZYPREXA) 5 MG ODT Take 1 tablet (5 mg) by mouth At Bedtime 30 tablet 0     ondansetron (ZOFRAN) 8 MG tablet Take 1 tablet (8 mg) by mouth every 8 hours as needed (Nausea/Vomiting) 10 tablet 7     ondansetron (ZOFRAN-ODT) 4 MG ODT tab Take 1 tablet (4 mg) by mouth every 6 hours as needed for nausea or vomiting 90 tablet 3     pantoprazole (PROTONIX) 40 MG EC tablet Take 1 tablet (40 mg) by mouth 2 times daily 60 tablet 1     senna-docusate (SENOKOT-S/PERICOLACE) 8.6-50 MG tablet Take 1 tablet by mouth daily as needed for constipation 30 tablet 0     simethicone 80 MG TABS Take 1 tablet by mouth every 6 hours as needed (bloating, gas, belching) 90 tablet 3     SUMAtriptan (IMITREX) 100 MG tablet Take 100 mg by mouth as  needed       tiZANidine (ZANAFLEX) 4 MG tablet Take 1 tablet by mouth 2 times daily as needed       traMADol (ULTRAM) 50 MG tablet Take 1 tablet (50 mg) by mouth every 6 hours as needed for severe pain 60 tablet 0       PMH, PSH, and FH reviewed and unchanged.  See my last note from 4/15/20    Review of Systems:   ROS: 10 point ROS neg other than the symptoms noted above in the HPI and pertinents here:  Palliative Symptom Review (0=no symptom/no concern, 1=mild, 2=moderate, 3=severe):      Pain:   1-2      Fatigue: 1      Nausea: 1      Constipation: 0      Diarrhea: 1      Depressive Symptoms: 0      Anxiety: 0      Drowsiness: 0      Poor Appetite: 1      Shortness of Breath: 0      Insomnia: 0      Other: 0      Overall (0 good/no concerns, 3 very poor):  1    GENERAL: Comfortable-appearing, alert and no distress. +alopecia  EYES: Eyes grossly normal to inspection, conjunctivae and sclerae normal  RESP: no audible wheeze, cough, or visible cyanosis.  No increased work of breathing on RA.  Able to speak fully in complete sentences.  SKIN: no suspicious lesions or rashes on exposed skin  NEURO: Cranial nerves grossly intact, mentation intact and speech normal.  No tremor.   PSYCH: mentation appears normal, affect normal/bright, judgement and insight intact, normal speech and appearance well-groomed  The rest of a comprehensive physical examination is deferred due to PHE (public health emergency) video visit restrictions    Wt Readings from Last 10 Encounters:   05/27/20 83.6 kg (184 lb 3.2 oz)   05/23/20 83.7 kg (184 lb 9.6 oz)   05/18/20 80.5 kg (177 lb 6.4 oz)   05/04/20 81.8 kg (180 lb 4.8 oz)   04/20/20 82.4 kg (181 lb 9.6 oz)   04/13/20 84.1 kg (185 lb 4.8 oz)   03/31/20 82.3 kg (181 lb 6.4 oz)   03/27/20 84.1 kg (185 lb 4.8 oz)   03/26/20 81.6 kg (180 lb)   03/19/20 82.2 kg (181 lb 3.2 oz)       Key Data Reviewed:  LABS:   Recent Labs   Lab Test 05/27/20  0648 05/23/20  1409 05/23/20  0619    139  --  141    POTASSIUM 3.9 3.4 3.1*   CHLORIDE 103  --  113*   CO2 31  --  26   ANIONGAP 4  --  2*   GLC 92  --  91   BUN 9  --  4*   CR 0.83  --  0.82   PETRA 8.7  --  7.8*     GFR 80  Alb 2.7  Hb 9.1    IMAGING:   PET 6/3/20  IMPRESSION: This 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 Residual uptake at the site of gastrojejunal anastomosis, could be  physiological, however consider endoscopy for confirmation.  2. Almost resolved soft tissue nodule next to the pancreaticoduodenal  anastomosis without FDG uptake.  3. Focal increased uptake in the left posterior acetabulum may be  artifact. Attention on follow up.       Impression & Recommendations & Counseling:  Nathalie Bashir is a 54-year-old female with incidentally found T4 N0 M0 gastric adenocarcinoma, undergoing neoadjuvant chemo before completion gastrectomy.  Has been having significant LUQ discomfort with pain, nausea, and heartburn.      LUQ pain, GERD, nausea - discomfort in LUQ with feelings of reflux, and at times nausea.  Reflux seems most predominant.  Sucralfate did not help.   - Continue BID pepcid and protonix  - Can take Tums up to TID (says was told not to take by someone but I don't see this documented. Ca, kidney function OK)  - Continue olanzapine 5 mg QHS for nausea, with zofran PRN  - Continue tramadol 50 mg q6h PRN, refilled today    Depressed mood - stable.  Continue citalopram.      Neuropathy - worsening after chemo in hands and cold sensitivity, will get better before next cycle.  Continue chronic elavil.       Gastric cancer, following with Dr. Eagle and Dr. Mallory.  Coping well.  Plan to continue chemo through July, followed by repeat surgery.      RTC in 2 months.     Video-Visit Details    Type of service:  Video Visit    Video Start Time: 8:24  Video End Time: 8:47    Originating Location (pt. Location): Home    Distant Location (provider location):  [facility]     Platform used for  Video Visit: Miguel A Scott MD  Palliative Medicine  Pager [phone]

## 2020-06-09 NOTE — PROGRESS NOTES
This is a recent snapshot of the patient's [facility] medical record.  For current drug dose and complete information and questions, call [phone] or In Basket pool, [facility] ([id number])  CSN Number:  [id number]

## 2020-06-10 ENCOUNTER — VIRTUAL VISIT (OUTPATIENT)
Dept: PALLIATIVE CARE | Facility: CLINIC | Age: 55
End: 2020-06-10
Attending: INTERNAL MEDICINE
Payer: MEDICARE

## 2020-06-10 ENCOUNTER — VIRTUAL VISIT (OUTPATIENT)
Dept: ONCOLOGY | Facility: CLINIC | Age: 55
End: 2020-06-10
Attending: PHYSICIAN ASSISTANT
Payer: MEDICARE

## 2020-06-10 VITALS — BODY MASS INDEX: 26.58 KG/M2 | WEIGHT: 180 LBS

## 2020-06-10 VITALS
BODY MASS INDEX: 25.96 KG/M2 | TEMPERATURE: 97.7 F | SYSTOLIC BLOOD PRESSURE: 90 MMHG | RESPIRATION RATE: 16 BRPM | DIASTOLIC BLOOD PRESSURE: 59 MMHG | WEIGHT: 175.8 LBS | OXYGEN SATURATION: 98 % | HEART RATE: 85 BPM

## 2020-06-10 DIAGNOSIS — C16.9 GASTRIC ADENOCARCINOMA (H): Primary | ICD-10-CM

## 2020-06-10 DIAGNOSIS — C16.9 GASTRIC ADENOCARCINOMA (H): ICD-10-CM

## 2020-06-10 DIAGNOSIS — R10.11 RUQ ABDOMINAL PAIN: ICD-10-CM

## 2020-06-10 LAB
ALBUMIN SERPL-MCNC: 3 G/DL (ref 3.4–5)
ALP SERPL-CCNC: 172 U/L (ref 40–150)
ALT SERPL W P-5'-P-CCNC: 43 U/L (ref 0–50)
ANION GAP SERPL CALCULATED.3IONS-SCNC: 7 MMOL/L (ref 3–14)
AST SERPL W P-5'-P-CCNC: 48 U/L (ref 0–45)
BASOPHILS # BLD AUTO: 0.1 10E9/L (ref 0–0.2)
BASOPHILS NFR BLD AUTO: 0.6 %
BILIRUB SERPL-MCNC: 0.3 MG/DL (ref 0.2–1.3)
BUN SERPL-MCNC: 10 MG/DL (ref 7–30)
CALCIUM SERPL-MCNC: 8.6 MG/DL (ref 8.5–10.1)
CHLORIDE SERPL-SCNC: 109 MMOL/L (ref 94–109)
CO2 SERPL-SCNC: 26 MMOL/L (ref 20–32)
CREAT SERPL-MCNC: 0.87 MG/DL (ref 0.52–1.04)
DIFFERENTIAL METHOD BLD: ABNORMAL
EOSINOPHIL # BLD AUTO: 0.1 10E9/L (ref 0–0.7)
EOSINOPHIL NFR BLD AUTO: 0.5 %
ERYTHROCYTE [DISTWIDTH] IN BLOOD BY AUTOMATED COUNT: 18.1 % (ref 10–15)
GFR SERPL CREATININE-BSD FRML MDRD: 75 ML/MIN/{1.73_M2}
GLUCOSE SERPL-MCNC: 130 MG/DL (ref 70–99)
HCT VFR BLD AUTO: 32.8 % (ref 35–47)
HGB BLD-MCNC: 9.8 G/DL (ref 11.7–15.7)
IMM GRANULOCYTES # BLD: 0.2 10E9/L (ref 0–0.4)
IMM GRANULOCYTES NFR BLD: 1.5 %
LYMPHOCYTES # BLD AUTO: 2.8 10E9/L (ref 0.8–5.3)
LYMPHOCYTES NFR BLD AUTO: 29.1 %
MCH RBC QN AUTO: 26.3 PG (ref 26.5–33)
MCHC RBC AUTO-ENTMCNC: 29.9 G/DL (ref 31.5–36.5)
MCV RBC AUTO: 88 FL (ref 78–100)
MONOCYTES # BLD AUTO: 0.9 10E9/L (ref 0–1.3)
MONOCYTES NFR BLD AUTO: 9.3 %
NEUTROPHILS # BLD AUTO: 5.8 10E9/L (ref 1.6–8.3)
NEUTROPHILS NFR BLD AUTO: 59 %
NRBC # BLD AUTO: 0 10*3/UL
NRBC BLD AUTO-RTO: 0 /100
PLATELET # BLD AUTO: 186 10E9/L (ref 150–450)
POTASSIUM SERPL-SCNC: 3 MMOL/L (ref 3.4–5.3)
PROT SERPL-MCNC: 6.6 G/DL (ref 6.8–8.8)
RBC # BLD AUTO: 3.73 10E12/L (ref 3.8–5.2)
SODIUM SERPL-SCNC: 142 MMOL/L (ref 133–144)
WBC # BLD AUTO: 9.8 10E9/L (ref 4–11)

## 2020-06-10 PROCEDURE — 85025 COMPLETE CBC W/AUTO DIFF WBC: CPT | Performed by: INTERNAL MEDICINE

## 2020-06-10 PROCEDURE — 40000114 ZZH STATISTIC NO CHARGE CLINIC VISIT

## 2020-06-10 PROCEDURE — 99214 OFFICE O/P EST MOD 30 MIN: CPT | Mod: 95 | Performed by: INTERNAL MEDICINE

## 2020-06-10 PROCEDURE — 80053 COMPREHEN METABOLIC PANEL: CPT | Performed by: INTERNAL MEDICINE

## 2020-06-10 PROCEDURE — 99214 OFFICE O/P EST MOD 30 MIN: CPT | Mod: 95 | Performed by: PHYSICIAN ASSISTANT

## 2020-06-10 PROCEDURE — 25000128 H RX IP 250 OP 636: Performed by: INTERNAL MEDICINE

## 2020-06-10 PROCEDURE — 36591 DRAW BLOOD OFF VENOUS DEVICE: CPT

## 2020-06-10 RX ORDER — NALOXONE HYDROCHLORIDE 0.4 MG/ML
.1-.4 INJECTION, SOLUTION INTRAMUSCULAR; INTRAVENOUS; SUBCUTANEOUS
Status: CANCELLED | OUTPATIENT
Start: 2020-06-11

## 2020-06-10 RX ORDER — DIPHENHYDRAMINE HYDROCHLORIDE 50 MG/ML
50 INJECTION INTRAMUSCULAR; INTRAVENOUS
Status: CANCELLED
Start: 2020-06-11

## 2020-06-10 RX ORDER — HEPARIN SODIUM (PORCINE) LOCK FLUSH IV SOLN 100 UNIT/ML 100 UNIT/ML
5 SOLUTION INTRAVENOUS ONCE
Status: COMPLETED | OUTPATIENT
Start: 2020-06-10 | End: 2020-06-10

## 2020-06-10 RX ORDER — METHYLPREDNISOLONE SODIUM SUCCINATE 125 MG/2ML
125 INJECTION, POWDER, LYOPHILIZED, FOR SOLUTION INTRAMUSCULAR; INTRAVENOUS
Status: CANCELLED
Start: 2020-06-11

## 2020-06-10 RX ORDER — ALBUTEROL SULFATE 0.83 MG/ML
2.5 SOLUTION RESPIRATORY (INHALATION)
Status: CANCELLED | OUTPATIENT
Start: 2020-06-11

## 2020-06-10 RX ORDER — SODIUM CHLORIDE 9 MG/ML
1000 INJECTION, SOLUTION INTRAVENOUS CONTINUOUS PRN
Status: CANCELLED
Start: 2020-06-11

## 2020-06-10 RX ORDER — ONDANSETRON 8 MG/1
8 TABLET, FILM COATED ORAL EVERY 8 HOURS PRN
Qty: 90 TABLET | Refills: 3 | Status: SHIPPED | OUTPATIENT
Start: 2020-06-10 | End: 2020-11-27

## 2020-06-10 RX ORDER — MEPERIDINE HYDROCHLORIDE 25 MG/ML
25 INJECTION INTRAMUSCULAR; INTRAVENOUS; SUBCUTANEOUS EVERY 30 MIN PRN
Status: CANCELLED | OUTPATIENT
Start: 2020-06-11

## 2020-06-10 RX ORDER — HEPARIN SODIUM (PORCINE) LOCK FLUSH IV SOLN 100 UNIT/ML 100 UNIT/ML
5 SOLUTION INTRAVENOUS
Status: CANCELLED | OUTPATIENT
Start: 2020-06-12

## 2020-06-10 RX ORDER — EPINEPHRINE 0.3 MG/.3ML
0.3 INJECTION SUBCUTANEOUS EVERY 5 MIN PRN
Status: CANCELLED | OUTPATIENT
Start: 2020-06-11

## 2020-06-10 RX ORDER — EPINEPHRINE 1 MG/ML
0.3 INJECTION, SOLUTION INTRAMUSCULAR; SUBCUTANEOUS EVERY 5 MIN PRN
Status: CANCELLED | OUTPATIENT
Start: 2020-06-11

## 2020-06-10 RX ORDER — LORAZEPAM 2 MG/ML
0.5 INJECTION INTRAMUSCULAR EVERY 4 HOURS PRN
Status: CANCELLED
Start: 2020-06-11

## 2020-06-10 RX ORDER — ALBUTEROL SULFATE 90 UG/1
1-2 AEROSOL, METERED RESPIRATORY (INHALATION)
Status: CANCELLED
Start: 2020-06-11

## 2020-06-10 RX ORDER — TRAMADOL HYDROCHLORIDE 50 MG/1
50 TABLET ORAL EVERY 6 HOURS PRN
Qty: 60 TABLET | Refills: 0 | Status: SHIPPED | OUTPATIENT
Start: 2020-06-10 | End: 2020-06-30

## 2020-06-10 RX ADMIN — HEPARIN SODIUM (PORCINE) LOCK FLUSH IV SOLN 100 UNIT/ML 5 ML: 100 SOLUTION at 10:02

## 2020-06-10 ASSESSMENT — PAIN SCALES - GENERAL: PAINLEVEL: MODERATE PAIN (4)

## 2020-06-10 NOTE — LETTER
"    6/10/2020         RE: Nathalie Bashir  [address] [facility] [address]      Nathalie Bashir is a 54 year old female who is being evaluated via a billable video visit.      The patient has been notified of following:     \"This video visit will be conducted via a call between you and your physician/provider. We have found that certain health care needs can be provided without the need for an in-person physical exam.  This service lets us provide the care you need with a video conversation.  If a prescription is necessary we can send it directly to your pharmacy.  If lab work is needed we can place an order for that and you can then stop by our lab to have the test done at a later time.    Video visits are billed at different rates depending on your insurance coverage.  Please reach out to your insurance provider with any questions.    If during the course of the call the physician/provider feels a video visit is not appropriate, you will not be charged for this service.\"    Patient has given verbal consent for Video visit? Yes    How would you like to obtain your AVS? [employer]    Patient would like the video invitation sent by: Text to cell phone: [phone]    Will anyone else be joining your video visit? No    STEPHENIE Sanches      Oncology/Hematology Visit Note  Te 10, 2020    Oncologic History  Ms. Bashir is a 54 year old female with a history of gastric adenocarcinoma.  She was being followed for a pancreatic duct dilatation and pancreatic cyst which was noted in November 2018.  Since May 2019 she started noticing some nausea and vomiting and occasional abdominal discomfort.  She had an ultrasound in August 2019 which was essentially unremarkable.  She had a repeat endoscopic ultrasound on 10/29/2019 which showed increase in size of the cyst as well as dilation of the main pancreatic duct.  FNA and fluid analysis showed mucinous epithelium.  She was referred to Dr. 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  Surprisingly there was no pancreatic ductal neoplasm seen but on the resected specimen stomach cancer was noted.  It was poorly differentiated adenocarcinoma with poorly cohesive/signet ring cell type with proximal gastric mucosal and serosal margins being positive.  There was lymphovascular and perineural invasion seen.  22 lymph nodes were sampled and all were benign.  It was a pT4aN0 lesion.  HER-2/christine FISH was not amplified.         EUS on 3/3/2020 without clear evidence of neoplasm endoscopically. Left gastric lymph node fine-needle aspiration was negative for carcinoma. Biopsy from the gastric jejunal anastomosis as well as lesser curvature of the stomach also did not show any malignancy.     She had a PET/CT on 3/13/2020 which showed soft tissue streaky density with increased FDG uptake adjacent to the pancreaticojejunostomy which could be neoplastic in origin.  There is mild increased FDG uptake in the gastric remnant.  Focal area of soft tissue thickening with fatty streakiness along medial laparotomy with increased FDG uptake which likely is inflammatory. Increased FDG uptake in thoracic esophagus which could be esophagitis.     She started on chemotherapy with 5FU, oxaliplatin, and Taxotere on 3/27/20. She was seen on 4/7/20 for evaluation of fevers. She was treated for possible pneumonia with Levaquin.     She was seen in clinic 4/13/20 for RUQ pain,CT and labs reassuring. She received cycles 2 and 3 of chemotherapy on 4/20/20 and 5/4/20. She was admitted from 5/21-5/23/20 with neutropenic aspiration pneumonia. She received cycle 4 on 5/27/20 with Neulasta support. PET/CT on 6/3/20 showed mild residual uptake at the site of gastrojejunal anastomosis which likely is physiologic.  There is almost resolution of soft tissue nodule next to the pancreaticoduodenal anastomosis without FDG uptake.  There is focal increased uptake in the left posterior  "acetabulum which could be artifact.    Interval History:  -Met with palliative care this morning and was recommended Tums tid. Also, Zofran was increased for nausea. Had more nausea this round.  -Notes cold sensitivity in feet and hands and lips. Worse this cycle. Occurs only with cold. Still present.   -Concerned about weight loss.  -Has been eating small, frequent meals.   -Has been drinking [place] Instant Breakfast with 2% milk.   -Has been trying to eat more meat.   -Stools were loose for 3 days following chemotherapy, now improved. Does not feel constipated. Took Imodium with relief.   -No issues with mouth sores. No hand foot syndrome.   -Shaved head about 2 weeks ago due to hair loss. Scalp is not sensitive.    -Feet neuropathy disappeared for a couple of weeks, then returned.     Objective:  Video physical exam  General: Patient appears well in no acute distress. Normal body habitus.  Skin: No visualized rash or lesions on visualized skin  Eyes: EOMI, no erythema, sclera icterus or discharge noted  Resp: Appears to be breathing comfortably without accessory muscle usage, speaking in full sentences, no cough  MSK: Appears to have normal range of motion based on visualized movements  Neurologic: No apparent tremors, facial movements symmetric  Psych: affect normal, alert and oriented  The rest of a comprehensive physical examination is deferred due to PHE (public health emergency) video restrictions\"    Labs:   6/10/2020 09:55   Sodium 142   Potassium 3.0 (L)   Chloride 109   Carbon Dioxide 26   Urea Nitrogen 10   Creatinine 0.87   GFR Estimate 75   GFR Estimate If Black 87   Calcium 8.6   Anion Gap 7   Albumin 3.0 (L)   Protein Total 6.6 (L)   Bilirubin Total 0.3   Alkaline Phosphatase 172 (H)   ALT 43   AST 48 (H)   Glucose 130 (H)   WBC 9.8   Hemoglobin 9.8 (L)   Hematocrit 32.8 (L)   Platelet Count 186   RBC Count 3.73 (L)   MCV 88   MCH 26.3 (L)   MCHC 29.9 (L)   RDW 18.1 (H)   Diff Method Automated " Method   % Neutrophils 59.0   % Lymphocytes 29.1   % Monocytes 9.3   % Eosinophils 0.5   % Basophils 0.6   % Immature Granulocytes 1.5   Nucleated RBCs 0   Absolute Neutrophil 5.8   Absolute Lymphocytes 2.8   Absolute Monocytes 0.9   Absolute Eosinophils 0.1   Absolute Basophils 0.1   Abs Immature Granulocytes 0.2   Absolute Nucleated RBC 0.0     Assessment and Plan:  1. 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 This was found on Whipple surgery specimen as it was done for presumed pancreatic IPMN although no pancreatic neoplasm was found.  It was a pT4aN0 lesion with lymphovascular invasion and perineural invasion seen. All 22 lymph nodes were negative.    She was started on neoadjuvant chemotherapy with FLOT (5FU, Oxaliplatin, Docetaxel) 3/27/20 with complications as noted above. She is having increased cold sensitivity that is becoming much more bothersome for her and impacts her ability to get in sufficient nutrition. Therefore, I will decrease the oxalplatin by 20%. She will continue with cycle 5 FLOT tomorrow. Will repeat a PET scan after 8 cycles and plan for surgery.    2. GI  Gastritis: Ongoing. Continue Pantoprazole 40mg BID and Famotidine 20mg BID. Now, will be starting on Tums tid, per palliative care.     RUQ/epigastric pain: Has had CT imaging with no acute findings. Suspect post surgical plus gastritis. Ok to continue Tramadol PRN. Now following with palliative care.     Elevated LFT's. Mild. Likely due to oxaliplatin. Will monitor for now.     3. Neuro  Baseline neuropathy in her feet for > 10 years. Initially improved with chemotherapy for unclear reasons, not back to her baseline. As above, will decrease oxaliplatin by 20% due to worsening cold sensitivity.    4. FEN  Hypokalemia: Worse again. Will increase K in her diet and will give replacement per protocol tomorrow.     Weight loss: Recommend trying [place] Instant Breakfast with  whole milk, drinking 2 per day.    Video-Visit Details    Type of service:  Video Visit    Video Start Time: 12:42 PM  Video End Time: 12:59 PM    Originating Location (pt. Location): Home    Distant Location (provider location):  [facility]     Platform used for Video Visit: Miguel A Sweet PA-C  [facility]  [address] [place] [address]  [phone]

## 2020-06-10 NOTE — NURSING NOTE
"Chief Complaint   Patient presents with     Port Draw     port accessed and labs drawn by rn in lab.  vital signs taken.     Port accessed by RN in lab with 20g 3/4\" gripper needle, labs drawn, port flushed with saline and heparin, port de-accessed.  vitals checked.    Joann Andrews RN      "

## 2020-06-10 NOTE — PATIENT INSTRUCTIONS
Thank you for coming into the Palliative Care Clinic today.      1. Continue taking the pepcid and pantoprazole twice/day.  You can take Tums or liquid Maalox up to three times/day for breakthrough heartburn.      2. I will send refills of the zofran and tramadol to the pharmacy for pick-up tomorrow.     Return in clinic in 2 months for a follow-up.      You can reach the Palliative Care Team during business hours at the following numbers:   -For the [facility], call [phone]  -To reach the palliative RN for questions or refills, call [phone]       To reach the Palliative Care Provider on-call After-hours or on holidays and weekends, call: [phone].  Please note that we are not able to provide pain medication refills on evenings or weekends.

## 2020-06-10 NOTE — PROGRESS NOTES
"Nathalie Bashir is a 54 year old female who is being evaluated via a billable video visit.      The patient has been notified of following:     \"This video visit will be conducted via a call between you and your physician/provider. We have found that certain health care needs can be provided without the need for an in-person physical exam.  This service lets us provide the care you need with a video conversation.  If a prescription is necessary we can send it directly to your pharmacy.  If lab work is needed we can place an order for that and you can then stop by our lab to have the test done at a later time.    Video visits are billed at different rates depending on your insurance coverage.  Please reach out to your insurance provider with any questions.    If during the course of the call the physician/provider feels a video visit is not appropriate, you will not be charged for this service.\"    Patient has given verbal consent for Video visit? Yes    How would you like to obtain your AVS? [facility]    Patient would like the video invitation sent by: Text to cell phone: [phone]    Will anyone else be joining your video visit? No    STEPHENIE Sanches      Oncology/Hematology Visit Note  Te 10, 2020    Oncologic History  Ms. Bashir is a 54 year old female with a history of gastric adenocarcinoma.  She was being followed for a pancreatic duct dilatation and pancreatic cyst which was noted in November 2018.  Since May 2019 she started noticing some nausea and vomiting and occasional abdominal discomfort.  She had an ultrasound in August 2019 which was essentially unremarkable.  She had a repeat endoscopic ultrasound on 10/29/2019 which showed increase in size of the cyst as well as dilation of the main pancreatic duct.  FNA and fluid analysis showed mucinous epithelium.  She was referred to Dr. 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  Surprisingly there was no pancreatic " ductal neoplasm seen but on the resected specimen stomach cancer was noted.  It was poorly differentiated adenocarcinoma with poorly cohesive/signet ring cell type with proximal gastric mucosal and serosal margins being positive.  There was lymphovascular and perineural invasion seen.  22 lymph nodes were sampled and all were benign.  It was a pT4aN0 lesion.  HER-2/christine FISH was not amplified.         EUS on 3/3/2020 without clear evidence of neoplasm endoscopically. Left gastric lymph node fine-needle aspiration was negative for carcinoma. Biopsy from the gastric jejunal anastomosis as well as lesser curvature of the stomach also did not show any malignancy.     She had a PET/CT on 3/13/2020 which showed soft tissue streaky density with increased FDG uptake adjacent to the pancreaticojejunostomy which could be neoplastic in origin.  There is mild increased FDG uptake in the gastric remnant.  Focal area of soft tissue thickening with fatty streakiness along medial laparotomy with increased FDG uptake which likely is inflammatory. Increased FDG uptake in thoracic esophagus which could be esophagitis.     She started on chemotherapy with 5FU, oxaliplatin, and Taxotere on 3/27/20. She was seen on 4/7/20 for evaluation of fevers. She was treated for possible pneumonia with Levaquin.     She was seen in clinic 4/13/20 for RUQ pain,CT and labs reassuring. She received cycles 2 and 3 of chemotherapy on 4/20/20 and 5/4/20. She was admitted from 5/21-5/23/20 with neutropenic aspiration pneumonia. She received cycle 4 on 5/27/20 with Neulasta support. PET/CT on 6/3/20 showed mild residual uptake at the site of gastrojejunal anastomosis which likely is physiologic.  There is almost resolution of soft tissue nodule next to the pancreaticoduodenal anastomosis without FDG uptake.  There is focal increased uptake in the left posterior acetabulum which could be artifact.    Interval History:  -Met with palliative care this morning  "and was recommended Tums tid. Also, Zofran was increased for nausea. Had more nausea this round.  -Notes cold sensitivity in feet and hands and lips. Worse this cycle. Occurs only with cold. Still present.   -Concerned about weight loss.  -Has been eating small, frequent meals.   -Has been drinking [place] Instant Breakfast with 2% milk.   -Has been trying to eat more meat.   -Stools were loose for 3 days following chemotherapy, now improved. Does not feel constipated. Took Imodium with relief.   -No issues with mouth sores. No hand foot syndrome.   -Shaved head about 2 weeks ago due to hair loss. Scalp is not sensitive.    -Feet neuropathy disappeared for a couple of weeks, then returned.     Objective:  Video physical exam  General: Patient appears well in no acute distress. Normal body habitus.  Skin: No visualized rash or lesions on visualized skin  Eyes: EOMI, no erythema, sclera icterus or discharge noted  Resp: Appears to be breathing comfortably without accessory muscle usage, speaking in full sentences, no cough  MSK: Appears to have normal range of motion based on visualized movements  Neurologic: No apparent tremors, facial movements symmetric  Psych: affect normal, alert and oriented  The rest of a comprehensive physical examination is deferred due to PHE (public health emergency) video restrictions\"    Labs:   6/10/2020 09:55   Sodium 142   Potassium 3.0 (L)   Chloride 109   Carbon Dioxide 26   Urea Nitrogen 10   Creatinine 0.87   GFR Estimate 75   GFR Estimate If Black 87   Calcium 8.6   Anion Gap 7   Albumin 3.0 (L)   Protein Total 6.6 (L)   Bilirubin Total 0.3   Alkaline Phosphatase 172 (H)   ALT 43   AST 48 (H)   Glucose 130 (H)   WBC 9.8   Hemoglobin 9.8 (L)   Hematocrit 32.8 (L)   Platelet Count 186   RBC Count 3.73 (L)   MCV 88   MCH 26.3 (L)   MCHC 29.9 (L)   RDW 18.1 (H)   Diff Method Automated Method   % Neutrophils 59.0   % Lymphocytes 29.1   % Monocytes 9.3   % Eosinophils 0.5   % " Basophils 0.6   % Immature Granulocytes 1.5   Nucleated RBCs 0   Absolute Neutrophil 5.8   Absolute Lymphocytes 2.8   Absolute Monocytes 0.9   Absolute Eosinophils 0.1   Absolute Basophils 0.1   Abs Immature Granulocytes 0.2   Absolute Nucleated RBC 0.0     Assessment and Plan:  1. 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 This was found on Whipple surgery specimen as it was done for presumed pancreatic IPMN although no pancreatic neoplasm was found.  It was a pT4aN0 lesion with lymphovascular invasion and perineural invasion seen. All 22 lymph nodes were negative.    She was started on neoadjuvant chemotherapy with FLOT (5FU, Oxaliplatin, Docetaxel) 3/27/20 with complications as noted above. She is having increased cold sensitivity that is becoming much more bothersome for her and impacts her ability to get in sufficient nutrition. Therefore, I will decrease the oxalplatin by 20%. She will continue with cycle 5 FLOT tomorrow. Will repeat a PET scan after 8 cycles and plan for surgery.    2. GI  Gastritis: Ongoing. Continue Pantoprazole 40mg BID and Famotidine 20mg BID. Now, will be starting on Tums tid, per palliative care.     RUQ/epigastric pain: Has had CT imaging with no acute findings. Suspect post surgical plus gastritis. Ok to continue Tramadol PRN. Now following with palliative care.     Elevated LFT's. Mild. Likely due to oxaliplatin. Will monitor for now.     3. Neuro  Baseline neuropathy in her feet for > 10 years. Initially improved with chemotherapy for unclear reasons, not back to her baseline. As above, will decrease oxaliplatin by 20% due to worsening cold sensitivity.    4. FEN  Hypokalemia: Worse again. Will increase K in her diet and will give replacement per protocol tomorrow.     Weight loss: Recommend trying [place] Instant Breakfast with whole milk, drinking 2 per day.    Video-Visit Details    Type of service:  Video  Visit    Video Start Time: 12:42 PM  Video End Time: 12:59 PM    Originating Location (pt. Location): Home    Distant Location (provider location):  [facility]     Platform used for Video Visit: Miguel A Sweet PA-C  [facility]  [address] [place] [id number]  [phone]

## 2020-06-10 NOTE — LETTER
"6/10/2020       RE: Nathalie Bashir  [address] [address]     Dear Colleague,    Thank you for referring your patient, Nathalie Bashir, to the [facility] at [facility]. Please see a copy of my visit note below.    Nathalie Bashir is a 54 year old female who is being evaluated via a billable video visit.      The patient has been notified of following:     \"This video visit will be conducted via a call between you and your physician/provider. We have found that certain health care needs can be provided without the need for an in-person physical exam.  This service lets us provide the care you need with a video conversation.  If a prescription is necessary we can send it directly to your pharmacy.  If lab work is needed we can place an order for that and you can then stop by our lab to have the test done at a later time.    Video visits are billed at different rates depending on your insurance coverage.  Please reach out to your insurance provider with any questions.    If during the course of the call the physician/provider feels a video visit is not appropriate, you will not be charged for this service.\"    Patient has given verbal consent for Video visit? Yes    How would you like to obtain your AVS? MyChart    Patient would like the video invitation sent by: Text to cell phone: [phone]    Will anyone else be joining your video visit? No      STEPHENIE Sanches      Palliative Care Outpatient Clinic Progress Note    Patient Name:  Nathalie Bashir  Primary Provider:  Marianne Gonzalez    Chief Complaint/Patient ID:   Nathalie Bashir is a 53yo F with gastric adenocarcinoma   - Found during [facility] 1/2020 for presumed IPMN - no pancreatic malignancy found, but had poorly differentiated adenoca   - Receiving neoadjuvant chemo (5FU, leucovorin, oxaliplatin, docetaxel) prior to planned curative intent surgery with Dr. Mohamud Mcgarry chronic neck/back pain " previously on opioids managed by a local pain clinic, has been off these for a few months with good control    Last Palliative Care Appointment: 4/15/20     Interim History:  Nathalie aBshir 54 year old female returns to be seen by palliative care today.  Video visit performed due to coronavirus.  She is alone today.    Since last visit, was admitted for pneumonia, seen by palliative care inpatient and started olanzapine 5 mg QHS for nausea during that visit.       She is continued to have left upper quadrant discomfort she describes as stomachache.  She feels she will have severe acid reflux, worse at night.  Feels like nothing has significantly calmed this.  Says every day is a little different.  Is taking olanzapine and Zofran which do help some nausea, but that feels different than the heartburn she is having.  No vomiting recently. She is taking twice a day Pepcid and a PPI.  Carafate did not help. Will take tramadol usually 2 or 3 times a day which helps some.  Has not been constipated, had loose stools yesterday that come and go.    Coping:  Feels she is otherwise been doing well, tries to keep upbeat attitude.    Social History:  Pertinent changes to social history/social situation since last visit: None  Lives with her , son, and 3 grandchildren  Retired, worked in special education  Advance Directive Status:  Has not completed  Social History     Tobacco Use     Smoking status: Former Smoker     Packs/day: 0.50     Years: 25.00     Pack years: 12.50     Types: Cigarettes     Last attempt to quit: 2020     Years since quittin.4     Smokeless tobacco: Never Used   Substance Use Topics     Alcohol use: No     Drug use: No         Allergies   Allergen Reactions     Gluten Meal Palpitations     Augmentin Rash     Oxycontin [Oxycodone]      Confusion, loopy, oxycodone is tolerated     Pregabalin      interfered with Cymbalta     Topiramate      Tongue numbness, taste alteration, irritable       Acetaminophen Nausea     Urine retention       [place] Extract      Gabapentin Dizziness and GI Disturbance     Latex Rash     Methadone Fatigue     Mold      Naprosyn [Naproxen] Dizziness and GI Disturbance     Oxymetholone      Seasonal Allergies      Current Outpatient Medications   Medication Sig Dispense Refill     amitriptyline (ELAVIL) 25 MG tablet Take 3 tablets by mouth At Bedtime       calcium carb 1250 mg, 500 mg [place],/vitamin D 200 units (OSCAL WITH D) 500-200 MG-UNIT per tablet Take 1 tablet by mouth 3 times daily (with meals)       citalopram (CELEXA) 20 MG tablet Take 20 mg by mouth every morning        dexamethasone (DECADRON) 4 MG tablet Take 1 tablet (4mg) the day before, day of, and 2 days after chemotherapy 12 tablet 3     diclofenac (FLECTOR) 1.3 % patch Place 1 patch onto the skin daily as needed        famotidine (PEPCID) 20 MG tablet Take 1 tablet (20 mg) by mouth 2 times daily 60 tablet 3     fluticasone (FLONASE) 50 MCG/ACT nasal spray Spray 2 sprays into both nostrils daily as needed        hydrOXYzine (ATARAX) 10 MG tablet Take 10 mg by mouth 3 times daily as needed for itching       LORazepam (ATIVAN) 0.5 MG tablet Take 1 tablet (0.5 mg) by mouth every 4 hours as needed (Anxiety, Nausea/Vomiting or Sleep) 30 tablet 5     meclizine (ANTIVERT) 25 MG tablet Take 1 tablet (25 mg) by mouth 3 times daily as needed for dizziness 30 tablet 0     multivitamin, therapeutic with minerals (MULTI-VITAMIN) TABS Take 1 tablet by mouth daily       OLANZapine zydis (ZYPREXA) 5 MG ODT Take 1 tablet (5 mg) by mouth At Bedtime 30 tablet 0     ondansetron (ZOFRAN) 8 MG tablet Take 1 tablet (8 mg) by mouth every 8 hours as needed (Nausea/Vomiting) 10 tablet 7     ondansetron (ZOFRAN-ODT) 4 MG ODT tab Take 1 tablet (4 mg) by mouth every 6 hours as needed for nausea or vomiting 90 tablet 3     pantoprazole (PROTONIX) 40 MG EC tablet Take 1 tablet (40 mg) by mouth 2 times daily 60 tablet 1     senna-docusate  (SENOKOT-S/PERICOLACE) 8.6-50 MG tablet Take 1 tablet by mouth daily as needed for constipation 30 tablet 0     simethicone 80 MG TABS Take 1 tablet by mouth every 6 hours as needed (bloating, gas, belching) 90 tablet 3     SUMAtriptan (IMITREX) 100 MG tablet Take 100 mg by mouth as needed       tiZANidine (ZANAFLEX) 4 MG tablet Take 1 tablet by mouth 2 times daily as needed       traMADol (ULTRAM) 50 MG tablet Take 1 tablet (50 mg) by mouth every 6 hours as needed for severe pain 60 tablet 0       PMH, PSH, and FH reviewed and unchanged.  See my last note from 4/15/20    Review of Systems:   ROS: 10 point ROS neg other than the symptoms noted above in the HPI and pertinents here:  Palliative Symptom Review (0=no symptom/no concern, 1=mild, 2=moderate, 3=severe):      Pain:   1-2      Fatigue: 1      Nausea: 1      Constipation: 0      Diarrhea: 1      Depressive Symptoms: 0      Anxiety: 0      Drowsiness: 0      Poor Appetite: 1      Shortness of Breath: 0      Insomnia: 0      Other: 0      Overall (0 good/no concerns, 3 very poor):  1    GENERAL: Comfortable-appearing, alert and no distress. +alopecia  EYES: Eyes grossly normal to inspection, conjunctivae and sclerae normal  RESP: no audible wheeze, cough, or visible cyanosis.  No increased work of breathing on RA.  Able to speak fully in complete sentences.  SKIN: no suspicious lesions or rashes on exposed skin  NEURO: Cranial nerves grossly intact, mentation intact and speech normal.  No tremor.   PSYCH: mentation appears normal, affect normal/bright, judgement and insight intact, normal speech and appearance well-groomed  The rest of a comprehensive physical examination is deferred due to PHE (public health emergency) video visit restrictions    Wt Readings from Last 10 Encounters:   05/27/20 83.6 kg (184 lb 3.2 oz)   05/23/20 83.7 kg (184 lb 9.6 oz)   05/18/20 80.5 kg (177 lb 6.4 oz)   05/04/20 81.8 kg (180 lb 4.8 oz)   04/20/20 82.4 kg (181 lb 9.6 oz)    04/13/20 84.1 kg (185 lb 4.8 oz)   03/31/20 82.3 kg (181 lb 6.4 oz)   03/27/20 84.1 kg (185 lb 4.8 oz)   03/26/20 81.6 kg (180 lb)   03/19/20 82.2 kg (181 lb 3.2 oz)       Key Data Reviewed:  LABS:   Recent Labs   Lab Test 05/27/20  0648 05/23/20  1409 05/23/20  0619     --  141   POTASSIUM 3.9 3.4 3.1*   CHLORIDE 103  --  113*   CO2 31  --  26   ANIONGAP 4  --  2*   GLC 92  --  91   BUN 9  --  4*   CR 0.83  --  0.82   PETRA 8.7  --  7.8*     GFR 80  Alb 2.7  Hb 9.1    IMAGING:   PET 6/3/20  IMPRESSION: This 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 Residual uptake at the site of gastrojejunal anastomosis, could be  physiological, however consider endoscopy for confirmation.  2. Almost resolved soft tissue nodule next to the pancreaticoduodenal  anastomosis without FDG uptake.  3. Focal increased uptake in the left posterior acetabulum may be  artifact. Attention on follow up.       Impression & Recommendations & Counseling:  Nathalie Bashir is a 54-year-old female with incidentally found T4 N0 M0 gastric adenocarcinoma, undergoing neoadjuvant chemo before completion gastrectomy.  Has been having significant LUQ discomfort with pain, nausea, and heartburn.      LUQ pain, GERD, nausea - discomfort in LUQ with feelings of reflux, and at times nausea.  Reflux seems most predominant.  Sucralfate did not help.   - Continue BID pepcid and protonix  - Can take Tums up to TID (says was told not to take by someone but I don't see this documented. Ca, kidney function OK)  - Continue olanzapine 5 mg QHS for nausea, with zofran PRN  - Continue tramadol 50 mg q6h PRN, refilled today    Depressed mood - stable.  Continue citalopram.      Neuropathy - worsening after chemo in hands and cold sensitivity, will get better before next cycle.  Continue chronic elavil.       Gastric cancer, following with Dr. Eagle and Dr. Mallory.  Coping well.  Plan to continue chemo through July,  followed by repeat surgery.      RTC in 2 months.     Video-Visit Details    Type of service:  Video Visit    Video Start Time: 8:24  Video End Time: 8:47    Originating Location (pt. Location): Home    Distant Location (provider location):  [facility] CANCER [facility]     Platform used for Video Visit: Miguel A Scott MD  Palliative Medicine  Pager [phone]

## 2020-06-11 ENCOUNTER — INFUSION THERAPY VISIT (OUTPATIENT)
Dept: ONCOLOGY | Facility: CLINIC | Age: 55
End: 2020-06-11
Attending: INTERNAL MEDICINE
Payer: MEDICARE

## 2020-06-11 VITALS
TEMPERATURE: 98.6 F | RESPIRATION RATE: 16 BRPM | DIASTOLIC BLOOD PRESSURE: 57 MMHG | HEART RATE: 91 BPM | SYSTOLIC BLOOD PRESSURE: 92 MMHG | OXYGEN SATURATION: 99 %

## 2020-06-11 DIAGNOSIS — C16.9 GASTRIC ADENOCARCINOMA (H): Primary | ICD-10-CM

## 2020-06-11 PROCEDURE — 96375 TX/PRO/DX INJ NEW DRUG ADDON: CPT

## 2020-06-11 PROCEDURE — 99207 ZZC NO BILLABLE SERVICE THIS VISIT: CPT | Mod: ZP

## 2020-06-11 PROCEDURE — 96368 THER/DIAG CONCURRENT INF: CPT

## 2020-06-11 PROCEDURE — G0498 CHEMO EXTEND IV INFUS W/PUMP: HCPCS

## 2020-06-11 PROCEDURE — 25000125 ZZHC RX 250: Mod: ZF | Performed by: PHYSICIAN ASSISTANT

## 2020-06-11 PROCEDURE — 25800030 ZZH RX IP 258 OP 636: Mod: ZF | Performed by: PHYSICIAN ASSISTANT

## 2020-06-11 PROCEDURE — 96415 CHEMO IV INFUSION ADDL HR: CPT

## 2020-06-11 PROCEDURE — 96413 CHEMO IV INFUSION 1 HR: CPT

## 2020-06-11 PROCEDURE — 25000128 H RX IP 250 OP 636: Mod: ZF | Performed by: PHYSICIAN ASSISTANT

## 2020-06-11 PROCEDURE — 25000132 ZZH RX MED GY IP 250 OP 250 PS 637: Mod: GY,ZF | Performed by: PHYSICIAN ASSISTANT

## 2020-06-11 PROCEDURE — 96417 CHEMO IV INFUS EACH ADDL SEQ: CPT

## 2020-06-11 RX ORDER — POTASSIUM CHLORIDE 1500 MG/1
40 TABLET, EXTENDED RELEASE ORAL ONCE
Status: COMPLETED | OUTPATIENT
Start: 2020-06-11 | End: 2020-06-11

## 2020-06-11 RX ADMIN — DEXAMETHASONE SODIUM PHOSPHATE: 10 INJECTION, SOLUTION INTRAMUSCULAR; INTRAVENOUS at 09:01

## 2020-06-11 RX ADMIN — OXALIPLATIN 135 MG: 5 INJECTION, SOLUTION INTRAVENOUS at 10:37

## 2020-06-11 RX ADMIN — SODIUM CHLORIDE 100 MG: 9 INJECTION, SOLUTION INTRAVENOUS at 09:29

## 2020-06-11 RX ADMIN — FAMOTIDINE 20 MG: 10 INJECTION INTRAVENOUS at 08:57

## 2020-06-11 RX ADMIN — LEUCOVORIN CALCIUM 400 MG: 200 INJECTION, POWDER, LYOPHILIZED, FOR SOLUTION INTRAMUSCULAR; INTRAVENOUS at 10:36

## 2020-06-11 RX ADMIN — SODIUM CHLORIDE 250 ML: 9 INJECTION, SOLUTION INTRAVENOUS at 08:57

## 2020-06-11 RX ADMIN — POTASSIUM CHLORIDE 40 MEQ: 1500 TABLET, EXTENDED RELEASE ORAL at 08:57

## 2020-06-11 RX ADMIN — DEXTROSE MONOHYDRATE 250 ML: 50 INJECTION, SOLUTION INTRAVENOUS at 10:33

## 2020-06-11 ASSESSMENT — PAIN SCALES - GENERAL: PAINLEVEL: MODERATE PAIN (5)

## 2020-06-11 NOTE — PATIENT INSTRUCTIONS
Contact Numbers  [facility]: [phone]        Please call the [facility] Triage line if you experience a temperature greater than or equal to 100.5, shaking chills, have uncontrolled nausea, vomiting and/or diarrhea, dizziness, shortness of breath, chest pain, bleeding, unexplained bruising, or if you have any other new/concerning symptoms, questions or concerns.     If it is after hours, weekends, or holidays, please call the main hospital  at  [phone] and ask to speak to the Oncology doctor on call.     If you are having any concerning symptoms or wish to speak to a provider before your next infusion visit, please call your care coordinator or triage to notify them so we can adequately serve you.     If you need a refill on a narcotic prescription or other medication, please call triage before your infusion appointment.

## 2020-06-11 NOTE — PROGRESS NOTES
Infusion Nursing Note:  Nathalie Bashir presents today for Cycle 5 Day 1 of Taxotere, Oxaliplatin, Leucovorin, and Fluorouracil pump connect.    Patient seen by provider today: No   present during visit today: Not Applicable.    Note: Patient reports feeling a little more nauseated this morning with abdominal pain at 5/10. She took her PRN antiemetics this AM and is feeling a little better. Talked with palliative yesterday for her LUQ pain control.  Denies any other changes overnight since talking with RAHEEL Sainz yesterday. Specifically denies chills, fevers, shortness of breath, dizziness, chest pain, or any respiratory symptoms. Reports taking decadron prior to infusion as prescribed. Continues to have worsening neuropathy, noting that her lips still feel numb from the last cycle. Per provider note from yesterday, Oxaliplatin dose reduced today.     Intravenous Access:  Implanted Port.    Treatment Conditions:  Lab Results   Component Value Date    HGB 9.8 06/10/2020     Lab Results   Component Value Date    WBC 9.8 06/10/2020      Lab Results   Component Value Date    ANEU 5.8 06/10/2020     Lab Results   Component Value Date     06/10/2020      Lab Results   Component Value Date     06/10/2020                   Lab Results   Component Value Date    POTASSIUM 3.0 06/10/2020           Lab Results   Component Value Date    MAG 2.0 03/31/2020            Lab Results   Component Value Date    CR 0.87 06/10/2020                   Lab Results   Component Value Date    PETRA 8.6 06/10/2020                Lab Results   Component Value Date    BILITOTAL 0.3 06/10/2020           Lab Results   Component Value Date    ALBUMIN 3.0 06/10/2020                    Lab Results   Component Value Date    ALT 43 06/10/2020           Lab Results   Component Value Date    AST 48 06/10/2020       Results reviewed, labs MET treatment parameters, ok to proceed with treatment.      Post Infusion  "Assessment:  Patient tolerated infusion without incident.  Blood return noted pre and post infusion.  Site patent and intact, free from redness, edema or discomfort.  No evidence of extravasations.     Prior to discharge: Port is secured in place with tegaderm and flushed with 10cc NS with positive blood return noted.  Continuous home infusion CADD pump connected.    All connectors secured in place and clamps taped open. Double checked with Carla Duke RN  Pump started, \"running\" noted on display (CADD): YES.  Patient instructed to call our clinic or [facility] with any questions or concerns at home.  Patient verbalized understanding.    Patient set up for pump disconnect and Neulasta Onpro at our clinic on 6/12 at 1PM    Discharge Plan:   Prescription refills given for Zofran and Tramadol.  Discharge instructions reviewed with: Patient.  Patient and/or family verbalized understanding of discharge instructions and all questions answered.  Copy of AVS reviewed with patient and/or family.  Patient will return 6/12 for pump disconnect and 6/25 for next appointment.  Patient discharged in stable condition accompanied by: self.  Departure Mode: Ambulatory.    Stephanie Delarosa RN                      "

## 2020-06-12 ENCOUNTER — INFUSION THERAPY VISIT (OUTPATIENT)
Dept: ONCOLOGY | Facility: CLINIC | Age: 55
DRG: 157 | End: 2020-06-12
Attending: INTERNAL MEDICINE
Payer: MEDICARE

## 2020-06-12 VITALS
OXYGEN SATURATION: 98 % | RESPIRATION RATE: 16 BRPM | SYSTOLIC BLOOD PRESSURE: 111 MMHG | HEART RATE: 98 BPM | TEMPERATURE: 98.4 F | DIASTOLIC BLOOD PRESSURE: 69 MMHG

## 2020-06-12 DIAGNOSIS — C16.9 GASTRIC ADENOCARCINOMA (H): Primary | ICD-10-CM

## 2020-06-12 PROCEDURE — 25000128 H RX IP 250 OP 636: Mod: ZF | Performed by: PHYSICIAN ASSISTANT

## 2020-06-12 PROCEDURE — 96377 APPLICATON ON-BODY INJECTOR: CPT

## 2020-06-12 RX ORDER — HEPARIN SODIUM (PORCINE) LOCK FLUSH IV SOLN 100 UNIT/ML 100 UNIT/ML
5 SOLUTION INTRAVENOUS
Status: DISCONTINUED | OUTPATIENT
Start: 2020-06-12 | End: 2020-06-12 | Stop reason: HOSPADM

## 2020-06-12 RX ADMIN — PEGFILGRASTIM 6 MG: KIT SUBCUTANEOUS at 13:25

## 2020-06-12 RX ADMIN — Medication 5 ML: at 13:18

## 2020-06-12 ASSESSMENT — PAIN SCALES - GENERAL: PAINLEVEL: MODERATE PAIN (5)

## 2020-06-12 NOTE — PROGRESS NOTES
Infusion Nursing Note:  Nathalie Bashir presents today for Fluorouracil pump disconnect.    Patient seen by provider today: No   present during visit today: Not Applicable.    Note: Patient reports having a rough morning. She has been having more nausea and continues to have pain in her LUQ of her abdomen at a 5/10. Taking Tramadol at home, which brings her pain down to a 3/10 usually. Took Zofran this AM and notes that it has worked well for her nausea. Denies any nausea currently. Had one loose bowel movement today, but notes that's normal for her. Having more difficulty eating and drinking today. RN offered to call MD about getting some IVF, but patient did not want to stay. She is going to try to push fluids at home today, but will call if it gets worse. Vitals stable. Notes that she does have some cold sensitivity, but the numbness in her lips feels better with the dose reduction. No other concerns or issues today.    Pump reservoir volume of 0 on arrival and pump showing completion. Bag appears empty. All connections intact.     Intravenous Access:  Implanted Port.    Treatment Conditions:  Not Applicable.      Post Infusion Assessment:  Patient tolerated infusion without incident.  Blood return noted post infusion.  Site patent and intact, free from redness, edema or discomfort.  No evidence of extravasations.  Access discontinued per protocol.     Neulasta Onpro On-Body injector applied to left abdomen at 1:25PM with light facing up and towards the navel.  Writer discussed Neulasta injection would start on 6/13/20 at 4:25PM, approximately 27 hours after application applied today.  Written and Verbal instruction reviewed with patient.  Pt instructed when the dose delivery starts, it will take about 45 minutes to complete.  Pt aware Neulasta Onpro On-Body should have green flashing light and to call triage or on-call MD if injector flashes red or appears to be leaking. Pt aware to keep Onpro On-Body  Neulasta 4 inches away from electrical equipment and to avoid showering 4 hours prior to injection.   Neulasta Onpro Lot number: [id number]    Discharge Plan:   Patient declined prescription refills.  Discharge instructions reviewed with: Patient.  Patient and/or family verbalized understanding of discharge instructions and all questions answered.  AVS to patient via Scaleform[employer].  Patient will return 6/24/20 for next appointment.   Patient discharged in stable condition accompanied by: self.  Departure Mode: Ambulatory.    Stephanie Delarosa RN

## 2020-06-15 ENCOUNTER — TELEPHONE (OUTPATIENT)
Dept: ONCOLOGY | Facility: CLINIC | Age: 55
End: 2020-06-15

## 2020-06-15 ENCOUNTER — HOSPITAL ENCOUNTER (INPATIENT)
Facility: CLINIC | Age: 55
LOS: 2 days | Discharge: HOME | DRG: 157 | End: 2020-06-17
Attending: EMERGENCY MEDICINE | Admitting: INTERNAL MEDICINE
Payer: MEDICARE

## 2020-06-15 ENCOUNTER — APPOINTMENT (OUTPATIENT)
Dept: CT IMAGING | Facility: CLINIC | Age: 55
DRG: 157 | End: 2020-06-15
Attending: EMERGENCY MEDICINE
Payer: MEDICARE

## 2020-06-15 DIAGNOSIS — Z20.828 EXPOSURE TO SARS-ASSOCIATED CORONAVIRUS: ICD-10-CM

## 2020-06-15 DIAGNOSIS — K12.30 MUCOSITIS: Primary | ICD-10-CM

## 2020-06-15 DIAGNOSIS — A41.9: ICD-10-CM

## 2020-06-15 DIAGNOSIS — A41.9 SEPSIS, DUE TO UNSPECIFIED ORGANISM, UNSPECIFIED WHETHER ACUTE ORGAN DYSFUNCTION PRESENT (H): ICD-10-CM

## 2020-06-15 DIAGNOSIS — R65.20: ICD-10-CM

## 2020-06-15 LAB
ALBUMIN SERPL-MCNC: 2.6 G/DL (ref 3.4–5)
ALP SERPL-CCNC: 186 U/L (ref 40–150)
ALT SERPL W P-5'-P-CCNC: 44 U/L (ref 0–50)
ANION GAP SERPL CALCULATED.3IONS-SCNC: 7 MMOL/L (ref 3–14)
ANISOCYTOSIS BLD QL SMEAR: ABNORMAL
AST SERPL W P-5'-P-CCNC: 46 U/L (ref 0–45)
BASOPHILS # BLD AUTO: 0 10E9/L (ref 0–0.2)
BASOPHILS NFR BLD AUTO: 0 %
BILIRUB SERPL-MCNC: 0.5 MG/DL (ref 0.2–1.3)
BUN SERPL-MCNC: 12 MG/DL (ref 7–30)
CALCIUM SERPL-MCNC: 8.1 MG/DL (ref 8.5–10.1)
CHLORIDE SERPL-SCNC: 109 MMOL/L (ref 94–109)
CO2 SERPL-SCNC: 26 MMOL/L (ref 20–32)
CREAT SERPL-MCNC: 0.78 MG/DL (ref 0.52–1.04)
DIFFERENTIAL METHOD BLD: ABNORMAL
EOSINOPHIL # BLD AUTO: 0 10E9/L (ref 0–0.7)
EOSINOPHIL NFR BLD AUTO: 0 %
ERYTHROCYTE [DISTWIDTH] IN BLOOD BY AUTOMATED COUNT: 18.7 % (ref 10–15)
GFR SERPL CREATININE-BSD FRML MDRD: 86 ML/MIN/{1.73_M2}
GLUCOSE SERPL-MCNC: 92 MG/DL (ref 70–99)
HCT VFR BLD AUTO: 31.7 % (ref 35–47)
HGB BLD-MCNC: 9.6 G/DL (ref 11.7–15.7)
LACTATE BLD-SCNC: 1.4 MMOL/L (ref 0.7–2)
LYMPHOCYTES # BLD AUTO: 1.7 10E9/L (ref 0.8–5.3)
LYMPHOCYTES NFR BLD AUTO: 6.2 %
MCH RBC QN AUTO: 26.1 PG (ref 26.5–33)
MCHC RBC AUTO-ENTMCNC: 30.3 G/DL (ref 31.5–36.5)
MCV RBC AUTO: 86 FL (ref 78–100)
MONOCYTES # BLD AUTO: 0 10E9/L (ref 0–1.3)
MONOCYTES NFR BLD AUTO: 0 %
NEUTROPHILS # BLD AUTO: 25.8 10E9/L (ref 1.6–8.3)
NEUTROPHILS NFR BLD AUTO: 93.8 %
PLATELET # BLD AUTO: 143 10E9/L (ref 150–450)
PLATELET # BLD EST: ABNORMAL 10*3/UL
POIKILOCYTOSIS BLD QL SMEAR: SLIGHT
POTASSIUM SERPL-SCNC: 3.5 MMOL/L (ref 3.4–5.3)
PROT SERPL-MCNC: 5.9 G/DL (ref 6.8–8.8)
RBC # BLD AUTO: 3.68 10E12/L (ref 3.8–5.2)
SARS-COV-2 PCR COMMENT: NORMAL
SARS-COV-2 RNA SPEC QL NAA+PROBE: NEGATIVE
SARS-COV-2 RNA SPEC QL NAA+PROBE: NORMAL
SODIUM SERPL-SCNC: 143 MMOL/L (ref 133–144)
SPECIMEN SOURCE: NORMAL
SPECIMEN SOURCE: NORMAL
WBC # BLD AUTO: 27.5 10E9/L (ref 4–11)

## 2020-06-15 PROCEDURE — 96365 THER/PROPH/DIAG IV INF INIT: CPT

## 2020-06-15 PROCEDURE — 25000132 ZZH RX MED GY IP 250 OP 250 PS 637: Mod: GY | Performed by: INTERNAL MEDICINE

## 2020-06-15 PROCEDURE — 85049 AUTOMATED PLATELET COUNT: CPT | Performed by: INTERNAL MEDICINE

## 2020-06-15 PROCEDURE — 25800030 ZZH RX IP 258 OP 636: Performed by: EMERGENCY MEDICINE

## 2020-06-15 PROCEDURE — 80053 COMPREHEN METABOLIC PANEL: CPT | Performed by: EMERGENCY MEDICINE

## 2020-06-15 PROCEDURE — 99285 EMERGENCY DEPT VISIT HI MDM: CPT | Mod: 25

## 2020-06-15 PROCEDURE — 70491 CT SOFT TISSUE NECK W/DYE: CPT

## 2020-06-15 PROCEDURE — 82565 ASSAY OF CREATININE: CPT | Performed by: INTERNAL MEDICINE

## 2020-06-15 PROCEDURE — 96375 TX/PRO/DX INJ NEW DRUG ADDON: CPT

## 2020-06-15 PROCEDURE — 25000132 ZZH RX MED GY IP 250 OP 250 PS 637: Mod: GY | Performed by: EMERGENCY MEDICINE

## 2020-06-15 PROCEDURE — 96376 TX/PRO/DX INJ SAME DRUG ADON: CPT

## 2020-06-15 PROCEDURE — 85025 COMPLETE CBC W/AUTO DIFF WBC: CPT | Performed by: EMERGENCY MEDICINE

## 2020-06-15 PROCEDURE — 99291 CRITICAL CARE FIRST HOUR: CPT | Mod: Z6 | Performed by: EMERGENCY MEDICINE

## 2020-06-15 PROCEDURE — 96367 TX/PROPH/DG ADDL SEQ IV INF: CPT

## 2020-06-15 PROCEDURE — 25800030 ZZH RX IP 258 OP 636: Performed by: INTERNAL MEDICINE

## 2020-06-15 PROCEDURE — 96361 HYDRATE IV INFUSION ADD-ON: CPT

## 2020-06-15 PROCEDURE — 87040 BLOOD CULTURE FOR BACTERIA: CPT | Performed by: EMERGENCY MEDICINE

## 2020-06-15 PROCEDURE — 83605 ASSAY OF LACTIC ACID: CPT | Performed by: EMERGENCY MEDICINE

## 2020-06-15 PROCEDURE — C9803 HOPD COVID-19 SPEC COLLECT: HCPCS

## 2020-06-15 PROCEDURE — 12000001 ZZH R&B MED SURG/OB UMMC

## 2020-06-15 PROCEDURE — 99223 1ST HOSP IP/OBS HIGH 75: CPT | Mod: AI | Performed by: INTERNAL MEDICINE

## 2020-06-15 PROCEDURE — U0003 INFECTIOUS AGENT DETECTION BY NUCLEIC ACID (DNA OR RNA); SEVERE ACUTE RESPIRATORY SYNDROME CORONAVIRUS 2 (SARS-COV-2) (CORONAVIRUS DISEASE [COVID-19]), AMPLIFIED PROBE TECHNIQUE, MAKING USE OF HIGH THROUGHPUT TECHNOLOGIES AS DESCRIBED BY CMS-2020-01-R: HCPCS | Performed by: EMERGENCY MEDICINE

## 2020-06-15 PROCEDURE — 25000128 H RX IP 250 OP 636: Performed by: STUDENT IN AN ORGANIZED HEALTH CARE EDUCATION/TRAINING PROGRAM

## 2020-06-15 PROCEDURE — 25000128 H RX IP 250 OP 636: Performed by: EMERGENCY MEDICINE

## 2020-06-15 RX ORDER — SIMETHICONE 80 MG
80 TABLET,CHEWABLE ORAL EVERY 6 HOURS PRN
Status: DISCONTINUED | OUTPATIENT
Start: 2020-06-15 | End: 2020-06-17 | Stop reason: HOSPADM

## 2020-06-15 RX ORDER — MULTIPLE VITAMINS W/ MINERALS TAB 9MG-400MCG
1 TAB ORAL DAILY
Status: DISCONTINUED | OUTPATIENT
Start: 2020-06-16 | End: 2020-06-17 | Stop reason: HOSPADM

## 2020-06-15 RX ORDER — PROCHLORPERAZINE MALEATE 5 MG
5 TABLET ORAL EVERY 6 HOURS PRN
Status: DISCONTINUED | OUTPATIENT
Start: 2020-06-15 | End: 2020-06-17 | Stop reason: HOSPADM

## 2020-06-15 RX ORDER — OLANZAPINE 5 MG/1
5 TABLET, ORALLY DISINTEGRATING ORAL AT BEDTIME
Status: DISCONTINUED | OUTPATIENT
Start: 2020-06-15 | End: 2020-06-17 | Stop reason: HOSPADM

## 2020-06-15 RX ORDER — MAGNESIUM SULFATE HEPTAHYDRATE 40 MG/ML
4 INJECTION, SOLUTION INTRAVENOUS EVERY 4 HOURS PRN
Status: DISCONTINUED | OUTPATIENT
Start: 2020-06-15 | End: 2020-06-17 | Stop reason: HOSPADM

## 2020-06-15 RX ORDER — PANTOPRAZOLE SODIUM 40 MG/1
40 TABLET, DELAYED RELEASE ORAL 2 TIMES DAILY
Status: DISCONTINUED | OUTPATIENT
Start: 2020-06-15 | End: 2020-06-17 | Stop reason: HOSPADM

## 2020-06-15 RX ORDER — AMOXICILLIN 250 MG
1 CAPSULE ORAL DAILY PRN
Status: DISCONTINUED | OUTPATIENT
Start: 2020-06-15 | End: 2020-06-17 | Stop reason: HOSPADM

## 2020-06-15 RX ORDER — ACETAMINOPHEN 325 MG/1
650 TABLET ORAL EVERY 4 HOURS PRN
Status: DISCONTINUED | OUTPATIENT
Start: 2020-06-15 | End: 2020-06-17 | Stop reason: HOSPADM

## 2020-06-15 RX ORDER — IOPAMIDOL 755 MG/ML
100 INJECTION, SOLUTION INTRAVASCULAR ONCE
Status: COMPLETED | OUTPATIENT
Start: 2020-06-15 | End: 2020-06-15

## 2020-06-15 RX ORDER — ONDANSETRON 4 MG/1
8 TABLET, ORALLY DISINTEGRATING ORAL EVERY 8 HOURS PRN
Status: DISCONTINUED | OUTPATIENT
Start: 2020-06-15 | End: 2020-06-17 | Stop reason: HOSPADM

## 2020-06-15 RX ORDER — DIPHENHYDRAMINE HYDROCHLORIDE AND LIDOCAINE HYDROCHLORIDE AND ALUMINUM HYDROXIDE AND MAGNESIUM HYDRO
10 KIT
Status: DISCONTINUED | OUTPATIENT
Start: 2020-06-15 | End: 2020-06-16

## 2020-06-15 RX ORDER — FAMOTIDINE 20 MG/1
20 TABLET, FILM COATED ORAL 2 TIMES DAILY
Status: DISCONTINUED | OUTPATIENT
Start: 2020-06-15 | End: 2020-06-17 | Stop reason: HOSPADM

## 2020-06-15 RX ORDER — PANTOPRAZOLE SODIUM 40 MG/1
40 TABLET, DELAYED RELEASE ORAL ONCE
Status: COMPLETED | OUTPATIENT
Start: 2020-06-15 | End: 2020-06-15

## 2020-06-15 RX ORDER — CITALOPRAM HYDROBROMIDE 20 MG/1
20 TABLET ORAL EVERY MORNING
Status: DISCONTINUED | OUTPATIENT
Start: 2020-06-16 | End: 2020-06-17 | Stop reason: HOSPADM

## 2020-06-15 RX ORDER — ONDANSETRON 2 MG/ML
8 INJECTION INTRAMUSCULAR; INTRAVENOUS EVERY 8 HOURS PRN
Status: DISCONTINUED | OUTPATIENT
Start: 2020-06-15 | End: 2020-06-17 | Stop reason: HOSPADM

## 2020-06-15 RX ORDER — SUMATRIPTAN 100 MG/1
100 TABLET, FILM COATED ORAL 2 TIMES DAILY PRN
Status: DISCONTINUED | OUTPATIENT
Start: 2020-06-15 | End: 2020-06-17 | Stop reason: HOSPADM

## 2020-06-15 RX ORDER — ONDANSETRON 2 MG/ML
4 INJECTION INTRAMUSCULAR; INTRAVENOUS ONCE
Status: COMPLETED | OUTPATIENT
Start: 2020-06-15 | End: 2020-06-15

## 2020-06-15 RX ORDER — HYDROXYZINE HYDROCHLORIDE 10 MG/1
10 TABLET, FILM COATED ORAL 3 TIMES DAILY PRN
Status: DISCONTINUED | OUTPATIENT
Start: 2020-06-15 | End: 2020-06-17 | Stop reason: HOSPADM

## 2020-06-15 RX ORDER — NALOXONE HYDROCHLORIDE 0.4 MG/ML
.1-.4 INJECTION, SOLUTION INTRAMUSCULAR; INTRAVENOUS; SUBCUTANEOUS
Status: DISCONTINUED | OUTPATIENT
Start: 2020-06-15 | End: 2020-06-17 | Stop reason: HOSPADM

## 2020-06-15 RX ORDER — POTASSIUM CHLORIDE 750 MG/1
20-40 TABLET, EXTENDED RELEASE ORAL
Status: DISCONTINUED | OUTPATIENT
Start: 2020-06-15 | End: 2020-06-17 | Stop reason: HOSPADM

## 2020-06-15 RX ORDER — ONDANSETRON 8 MG/1
8 TABLET, FILM COATED ORAL EVERY 8 HOURS PRN
Status: DISCONTINUED | OUTPATIENT
Start: 2020-06-15 | End: 2020-06-17 | Stop reason: HOSPADM

## 2020-06-15 RX ORDER — POTASSIUM CL/LIDO/0.9 % NACL 10MEQ/0.1L
10 INTRAVENOUS SOLUTION, PIGGYBACK (ML) INTRAVENOUS
Status: DISCONTINUED | OUTPATIENT
Start: 2020-06-15 | End: 2020-06-17 | Stop reason: HOSPADM

## 2020-06-15 RX ORDER — LORAZEPAM 0.5 MG/1
0.5 TABLET ORAL EVERY 4 HOURS PRN
Status: DISCONTINUED | OUTPATIENT
Start: 2020-06-15 | End: 2020-06-17 | Stop reason: HOSPADM

## 2020-06-15 RX ORDER — SODIUM CHLORIDE, SODIUM LACTATE, POTASSIUM CHLORIDE, CALCIUM CHLORIDE 600; 310; 30; 20 MG/100ML; MG/100ML; MG/100ML; MG/100ML
INJECTION, SOLUTION INTRAVENOUS CONTINUOUS
Status: DISCONTINUED | OUTPATIENT
Start: 2020-06-15 | End: 2020-06-17 | Stop reason: HOSPADM

## 2020-06-15 RX ORDER — FLUTICASONE PROPIONATE 50 MCG
2 SPRAY, SUSPENSION (ML) NASAL DAILY PRN
Status: DISCONTINUED | OUTPATIENT
Start: 2020-06-15 | End: 2020-06-17 | Stop reason: HOSPADM

## 2020-06-15 RX ORDER — POTASSIUM CHLORIDE 1.5 G/1.58G
20-40 POWDER, FOR SOLUTION ORAL
Status: DISCONTINUED | OUTPATIENT
Start: 2020-06-15 | End: 2020-06-17 | Stop reason: HOSPADM

## 2020-06-15 RX ORDER — POTASSIUM CHLORIDE 29.8 MG/ML
20 INJECTION INTRAVENOUS
Status: DISCONTINUED | OUTPATIENT
Start: 2020-06-15 | End: 2020-06-17 | Stop reason: HOSPADM

## 2020-06-15 RX ORDER — FAMOTIDINE 20 MG/1
20 TABLET, FILM COATED ORAL ONCE
Status: COMPLETED | OUTPATIENT
Start: 2020-06-15 | End: 2020-06-15

## 2020-06-15 RX ORDER — TRAMADOL HYDROCHLORIDE 50 MG/1
50 TABLET ORAL EVERY 6 HOURS PRN
Status: DISCONTINUED | OUTPATIENT
Start: 2020-06-15 | End: 2020-06-17 | Stop reason: HOSPADM

## 2020-06-15 RX ORDER — LIDOCAINE HYDROCHLORIDE 20 MG/ML
5 SOLUTION OROPHARYNGEAL
Status: DISCONTINUED | OUTPATIENT
Start: 2020-06-15 | End: 2020-06-17 | Stop reason: HOSPADM

## 2020-06-15 RX ORDER — AMITRIPTYLINE HYDROCHLORIDE 75 MG/1
75 TABLET ORAL AT BEDTIME
Status: DISCONTINUED | OUTPATIENT
Start: 2020-06-15 | End: 2020-06-17 | Stop reason: HOSPADM

## 2020-06-15 RX ORDER — POTASSIUM CHLORIDE 7.45 MG/ML
10 INJECTION INTRAVENOUS
Status: DISCONTINUED | OUTPATIENT
Start: 2020-06-15 | End: 2020-06-17 | Stop reason: HOSPADM

## 2020-06-15 RX ADMIN — IOPAMIDOL 100 ML: 755 INJECTION, SOLUTION INTRAVENOUS at 16:24

## 2020-06-15 RX ADMIN — LORAZEPAM 0.5 MG: 0.5 TABLET ORAL at 21:50

## 2020-06-15 RX ADMIN — TRAMADOL HYDROCHLORIDE 50 MG: 50 TABLET, FILM COATED ORAL at 22:38

## 2020-06-15 RX ADMIN — PANTOPRAZOLE SODIUM 40 MG: 40 TABLET, DELAYED RELEASE ORAL at 17:17

## 2020-06-15 RX ADMIN — SODIUM CHLORIDE 2382 ML: 9 INJECTION, SOLUTION INTRAVENOUS at 15:01

## 2020-06-15 RX ADMIN — ONDANSETRON 4 MG: 2 INJECTION INTRAMUSCULAR; INTRAVENOUS at 15:00

## 2020-06-15 RX ADMIN — OLANZAPINE 5 MG: 5 TABLET, ORALLY DISINTEGRATING ORAL at 21:51

## 2020-06-15 RX ADMIN — HYDROMORPHONE HYDROCHLORIDE 1 MG: 1 INJECTION, SOLUTION INTRAMUSCULAR; INTRAVENOUS; SUBCUTANEOUS at 16:04

## 2020-06-15 RX ADMIN — HYDROMORPHONE HYDROCHLORIDE 1 MG: 1 INJECTION, SOLUTION INTRAMUSCULAR; INTRAVENOUS; SUBCUTANEOUS at 15:02

## 2020-06-15 RX ADMIN — SODIUM CHLORIDE, POTASSIUM CHLORIDE, SODIUM LACTATE AND CALCIUM CHLORIDE: 600; 310; 30; 20 INJECTION, SOLUTION INTRAVENOUS at 21:20

## 2020-06-15 RX ADMIN — AMITRIPTYLINE HYDROCHLORIDE 75 MG: 75 TABLET, FILM COATED ORAL at 23:12

## 2020-06-15 RX ADMIN — FAMOTIDINE 20 MG: 20 TABLET, FILM COATED ORAL at 17:33

## 2020-06-15 RX ADMIN — HYDROMORPHONE HYDROCHLORIDE 1 MG: 1 INJECTION, SOLUTION INTRAMUSCULAR; INTRAVENOUS; SUBCUTANEOUS at 17:16

## 2020-06-15 RX ADMIN — VANCOMYCIN HYDROCHLORIDE 2000 MG: 1 INJECTION, POWDER, LYOPHILIZED, FOR SOLUTION INTRAVENOUS at 17:18

## 2020-06-15 RX ADMIN — CEFEPIME 2 G: 2 INJECTION, POWDER, FOR SOLUTION INTRAVENOUS at 16:04

## 2020-06-15 ASSESSMENT — MIFFLIN-ST. JEOR
SCORE: 1471.1
SCORE: 1463.17

## 2020-06-15 ASSESSMENT — ACTIVITIES OF DAILY LIVING (ADL)
COGNITION: 0 - NO COGNITION ISSUES REPORTED
SWALLOWING: 0-->SWALLOWS FOODS/LIQUIDS WITHOUT DIFFICULTY
AMBULATION: 0-->INDEPENDENT
RETIRED_EATING: 0-->INDEPENDENT
TRANSFERRING: 0-->INDEPENDENT
FALL_HISTORY_WITHIN_LAST_SIX_MONTHS: NO
DRESS: 0-->INDEPENDENT
TOILETING: 0-->INDEPENDENT
BATHING: 0-->INDEPENDENT
RETIRED_COMMUNICATION: 0-->UNDERSTANDS/COMMUNICATES WITHOUT DIFFICULTY

## 2020-06-15 ASSESSMENT — ENCOUNTER SYMPTOMS
CHILLS: 0
COUGH: 0
SHORTNESS OF BREATH: 0
FEVER: 0

## 2020-06-15 ASSESSMENT — PAIN DESCRIPTION - DESCRIPTORS
DESCRIPTORS: SORE
DESCRIPTORS: SORE

## 2020-06-15 NOTE — ED PROVIDER NOTES
ED Provider Note  [facility]      History     Chief Complaint   Patient presents with     Fever     HPI  Nathalie Bashir is a 54 year old female who 54-year-old female past medical surgical history of stomach cancer, depression, migraine, here with a few days of malaise and 1 day of sore throat and pain while swallowing.  States she had some white phlegm to the back of her throat which he vomited up.  Has had a poor appetite the rest of the morning.  Denies fever chills chest pain difficulty breathing abdominal pain, dysuria or frequency.  States her stomach pain is baseline for her.  Also she has a migraine which is consistent with previous migraines.  No neck pain or stiffness.  No photophobia.    Past Medical History  Past Medical History:   Diagnosis Date     Allergic rhinitis      Cancer (H)     stomach cancer     Depression      Lumbago      Migraine      Other chronic pain     low back     Sacroiliitis (H)     low back pain     Trochanteric bursitis     leg length discrrepancy, hip pain     Past Surgical History:   Procedure Laterality Date     ARTHROPLASTY HIP Left 2016     BACK SURGERY      L4-5 decompression     C REPAIR DETACH RETINA,SCLERAL BUCKLE       CATARACT IOL, RT/LT       CHOLECYSTECTOMY N/A 1/28/2020    Procedure: Cholecystectomy;  Surgeon: Yandel Mallory MD;  Location:  OR     ESOPHAGOSCOPY, GASTROSCOPY, DUODENOSCOPY (EGD), COMBINED N/A 3/3/2020    Procedure: ESOPHAGOGASTRODUODENOSCOPY, WITH ENDOSCOPIC US (enoxaparin);  Surgeon: Bakari Plata MD;  [facility]     EYE SURGERY       IR CHEST PORT PLACEMENT > 5 YRS OF AGE  3/26/2020     OPEN REDUCTION INTERNAL FIXATION RODDING INTRAMEDULLARY FEMUR Left 12/23/2014    Procedure: OPEN REDUCTION INTERNAL FIXATION RODDING INTRAMEDULLARY FEMUR;  Surgeon: Arnol Santiago MD;  Location:  OR     ORTHOPEDIC SURGERY       PICC DOUBLE LUMEN PLACEMENT Right 02/07/2020    5 fr double lumen 41 cm     REMOVE HARDWARE  LOWER EXTREMITY Left 4/7/2015    Procedure: REMOVE HARDWARE LOWER EXTREMITY;  Surgeon: Arnol Santiago MD;  Location: [facility]     REMOVE HARDWARE RODDING INTRAMEDULLARY FEMUR Left 12/17/2015    Procedure: REMOVE HARDWARE RODDING INTRAMEDULLARY FEMUR;  Surgeon: Arnol Santiago MD;  Location[facility]     RESECT BONE LOWER EXTREMITY Left 12/23/2014    Procedure: RESECT BONE LOWER EXTREMITY;  Surgeon: Arnol Santiago MD;  Location: [facility]     WHIPPLE PROCEDURE N/A 1/28/2020    Procedure: Open Whipple procedure and Cholecystectomy;  Surgeon: Yandel Mallory MD;  [facility]     amitriptyline (ELAVIL) 25 MG tablet  calcium carb 1250 mg, 500 mg [place],/vitamin D 200 units (OSCAL WITH D) 500-200 MG-UNIT per tablet  citalopram (CELEXA) 20 MG tablet  dexamethasone (DECADRON) 4 MG tablet  diclofenac (FLECTOR) 1.3 % patch  famotidine (PEPCID) 20 MG tablet  fluticasone (FLONASE) 50 MCG/ACT nasal spray  hydrOXYzine (ATARAX) 10 MG tablet  LORazepam (ATIVAN) 0.5 MG tablet  meclizine (ANTIVERT) 25 MG tablet  multivitamin, therapeutic with minerals (MULTI-VITAMIN) TABS  OLANZapine zydis (ZYPREXA) 5 MG ODT  ondansetron (ZOFRAN) 8 MG tablet  ondansetron (ZOFRAN-ODT) 4 MG ODT tab  pantoprazole (PROTONIX) 40 MG EC tablet  senna-docusate (SENOKOT-S/PERICOLACE) 8.6-50 MG tablet  simethicone 80 MG TABS  SUMAtriptan (IMITREX) 100 MG tablet  tiZANidine (ZANAFLEX) 4 MG tablet  traMADol (ULTRAM) 50 MG tablet      Allergies   Allergen Reactions     Gluten Meal Palpitations     Augmentin Rash     Oxycontin [Oxycodone]      Confusion, loopy, oxycodone is tolerated     Pregabalin      interfered with Cymbalta     Topiramate      Tongue numbness, taste alteration, irritable      Acetaminophen Nausea     Urine retention       Dust Mite Extract      Gabapentin Dizziness and GI Disturbance     Latex Rash     Methadone Fatigue     Mold      Naprosyn [Naproxen] Dizziness and GI Disturbance     Oxymetholone      Seasonal Allergies      Past medical  "history, past surgical history, medications, and allergies were reviewed with the patient. Additional pertinent items: None    Family History  Family History   Problem Relation Age of Onset     Heart Failure Mother 77     Anesthesia Reaction No family hx of      Deep Vein Thrombosis No family hx of      Family history was reviewed with the patient. Additional pertinent items: None    Social History  Social History     Tobacco Use     Smoking status: Former Smoker     Packs/day: 0.50     Years: 25.00     Pack years: 12.50     Types: Cigarettes     Last attempt to quit: 2020     Years since quittin.4     Smokeless tobacco: Never Used   Substance Use Topics     Alcohol use: No     Drug use: No      Social history was reviewed with the patient. Additional pertinent items: None    Review of Systems   Constitutional: Negative for chills and fever.   Respiratory: Negative for cough and shortness of breath.    All other systems reviewed and are negative.    A complete review of systems was performed with pertinent positives and negatives noted in the HPI, and all other systems negative.    Physical Exam   BP: 116/50  Pulse: 89  Heart Rate: 91  Temp: 100.3  F (37.9  C)  Resp: 16  Height: 175.3 cm (5' 9\")  Weight: 79.4 kg (175 lb)  SpO2: 96 %  Physical Exam  Vitals signs and nursing note reviewed.   Constitutional:       General: She is not in acute distress.     Appearance: She is not ill-appearing, toxic-appearing or diaphoretic.   HENT:      Head: Atraumatic.      Mouth/Throat:      Mouth: Mucous membranes are dry.      Pharynx: Oropharynx is clear. No oropharyngeal exudate or posterior oropharyngeal erythema.   Eyes:      General: No scleral icterus.     Pupils: Pupils are equal, round, and reactive to light.   Neck:      Musculoskeletal: No neck rigidity or muscular tenderness.   Cardiovascular:      Rate and Rhythm: Tachycardia present.      Heart sounds: Normal heart sounds.   Pulmonary:      Effort: No " respiratory distress.      Breath sounds: Normal breath sounds.   Abdominal:      General: Bowel sounds are normal.      Palpations: Abdomen is soft.      Tenderness: There is abdominal tenderness.   Musculoskeletal:         General: No tenderness, deformity or signs of injury.      Right lower leg: No edema.      Left lower leg: No edema.   Skin:     General: Skin is warm.      Findings: No rash.   Neurological:      General: No focal deficit present.      Mental Status: She is oriented to person, place, and time. Mental status is at baseline.         ED Course      Procedures                Critical Care Addendum    My initial assessment, based on my review of nursing observations, review of vital signs, focused history, physical exam and review of cardiac rhythm monitor, established that Nathalie Bashir has suspicion for sepsis and need for evaluation and early goal-directed therapy, which requires immediate intervention, and therefore she is critically ill.     After the initial assessment, the care team initiated multiple lab tests, initiated IV fluid administration and initiated medication therapy with cef azithromycin to provide stabilization care. Due to the critical nature of this patient, I reassessed vital signs, physical exam, review of cardiac rhythm monitor and respiratory status multiple times prior to her disposition.     Time also spent performing discussion with family to obtain medical information for decision making, reviewing test results and coordination of care.     Critical care time (excluding teaching time and procedures): 37 minutes.            Results for orders placed or performed during the hospital encounter of 06/15/20   Soft tissue neck CT w contrast     Status: None (Preliminary result)    Narrative    CT SOFT TISSUE NECK W CONTRAST 6/15/2020 4:35 PM    History:  neck pain in setting of undifferentiated sepsis    Comparison:  WB PET/CT from 6/3/2020     Technique: Following  intravenous administration of nonionic iodinated  contrast medium, thin section helical CT images were obtained from the  skull base down to the level of the aortic arch.  Axial, coronal and  sagittal reformations were performed with 2-3 mm slice thickness  reconstruction. Images were reviewed in soft tissue, lung and bone  windows.    Contrast: iopamidol (ISOVUE-370) solution 100 mL    Findings:   Evaluation of the mucosal space demonstrates no evident abnormality in  the nasopharynx, oropharynx, hypopharynx or the glottis. The tongue  base appears normal. The major salivary glands appear unremarkable.  The thyroid gland appears normal.    There is no evident cervical lymphadenopathy. The fascial spaces in  the neck are intact bilaterally. The major vascular structures in the  neck appear unremarkable.    Evaluation of the osseous structures demonstrate no worrisome lytic or  sclerotic lesion. No overt spinal canal or neuroforaminal stenosis.     No significant dental disease. The visualized paranasal sinuses are  clear. The mastoid air cells are clear.     The visualized lung apices are clear.    Right-sided Port-A-Cath.      Impression    Impression:    1. Unremarkable CT study of the neck with contrast.  No evident mass,  abscess, or adenopathy within the neck. .   CBC with platelets differential     Status: Abnormal   Result Value Ref Range    WBC 27.5 (H) 4.0 - 11.0 10e9/L    RBC Count 3.68 (L) 3.8 - 5.2 10e12/L    Hemoglobin 9.6 (L) 11.7 - 15.7 g/dL    Hematocrit 31.7 (L) 35.0 - 47.0 %    MCV 86 78 - 100 fl    MCH 26.1 (L) 26.5 - 33.0 pg    MCHC 30.3 (L) 31.5 - 36.5 g/dL    RDW 18.7 (H) 10.0 - 15.0 %    Platelet Count 143 (L) 150 - 450 10e9/L    Diff Method Manual Differential     % Neutrophils 93.8 %    % Lymphocytes 6.2 %    % Monocytes 0.0 %    % Eosinophils 0.0 %    % Basophils 0.0 %    Absolute Neutrophil 25.8 (H) 1.6 - 8.3 10e9/L    Absolute Lymphocytes 1.7 0.8 - 5.3 10e9/L    Absolute Monocytes 0.0 0.0  - 1.3 10e9/L    Absolute Eosinophils 0.0 0.0 - 0.7 10e9/L    Absolute Basophils 0.0 0.0 - 0.2 10e9/L    Anisocytosis Moderate     Poikilocytosis Slight     Platelet Estimate Confirming automated cell count    Comprehensive metabolic panel     Status: Abnormal   Result Value Ref Range    Sodium 143 133 - 144 mmol/L    Potassium 3.5 3.4 - 5.3 mmol/L    Chloride 109 94 - 109 mmol/L    Carbon Dioxide 26 20 - 32 mmol/L    Anion Gap 7 3 - 14 mmol/L    Glucose 92 70 - 99 mg/dL    Urea Nitrogen 12 7 - 30 mg/dL    Creatinine 0.78 0.52 - 1.04 mg/dL    GFR Estimate 86 >60 mL/min/[1.73_m2]    GFR Estimate If Black >90 >60 mL/min/[1.73_m2]    Calcium 8.1 (L) 8.5 - 10.1 mg/dL    Bilirubin Total 0.5 0.2 - 1.3 mg/dL    Albumin 2.6 (L) 3.4 - 5.0 g/dL    Protein Total 5.9 (L) 6.8 - 8.8 g/dL    Alkaline Phosphatase 186 (H) 40 - 150 U/L    ALT 44 0 - 50 U/L    AST 46 (H) 0 - 45 U/L   Lactic acid whole blood     Status: None   Result Value Ref Range    Lactic Acid 1.4 0.7 - 2.0 mmol/L     Medications   vancomycin (VANCOCIN) 2,000 mg in sodium chloride 0.9 % 500 mL intermittent infusion (2,000 mg Intravenous New Bag 6/15/20 1718)   vancomycin 1500 mg in 0.9% NaCl 250 ml intermittent infusion 1,500 mg (has no administration in time range)   famotidine (PEPCID) tablet 20 mg (has no administration in time range)   0.9% sodium chloride BOLUS (0 mLs Intravenous Stopped 6/15/20 1719)   HYDROmorphone (DILAUDID) injection 1 mg (1 mg Intravenous Given 6/15/20 1716)   ondansetron (ZOFRAN) injection 4 mg (4 mg Intravenous Given 6/15/20 1500)   ceFEPIme (MAXIPIME) 2 g vial to attach to  ml bag for ADULTS or 50 ml bag for PEDS (0 g Intravenous Stopped 6/15/20 1702)   iopamidol (ISOVUE-370) solution 100 mL (100 mLs Intravenous Given 6/15/20 1624)   sodium chloride (PF) 0.9% PF flush 60 mL (60 mLs Intravenous Given 6/15/20 1625)   pantoprazole (PROTONIX) EC tablet 40 mg (40 mg Oral Given 6/15/20 1717)        Assessments & Plan (with Medical  Decision Making)     33-year-old female with active cancer here with tachycardia dehydration and borderline fever, will administer empiric antibiotics, CT scan of neck to rule out retropharyngeal abscess however suspicion low, and frequent reassessments.    I have reviewed the nursing notes. I have reviewed the findings, diagnosis, plan and need for follow up with the patient.    New Prescriptions    No medications on file       Final diagnoses:   Sepsis, due to unspecified organism, unspecified whether acute organ dysfunction present (H)       --  Victor Manuel Bradshaw MD   Emergency Medicine   [facility], [place], EMERGENCY DEPARTMENT  6/15/2020     Victor Manuel Bradshaw MD  06/15/20 [id number]

## 2020-06-15 NOTE — ED NOTES
[facility], [place]   ED Nurse to Floor Handoff     Nathalie Bashir is a 54 year old female who speaks English and lives with a spouse,  in a home  They arrived in the ED by car from home    ED Chief Complaint: Fever    ED Dx;   Final diagnoses:   Sepsis, due to unspecified organism, unspecified whether acute organ dysfunction present (H)         Needed?: No    Allergies:   Allergies   Allergen Reactions     Gluten Meal Palpitations     Augmentin Rash     Oxycontin [Oxycodone]      Confusion, loopy, oxycodone is tolerated     Pregabalin      interfered with Cymbalta     Topiramate      Tongue numbness, taste alteration, irritable      Acetaminophen Nausea     Urine retention       Dust Mite Extract      Gabapentin Dizziness and GI Disturbance     Latex Rash     Methadone Fatigue     Mold      Naprosyn [Naproxen] Dizziness and GI Disturbance     Oxymetholone      Seasonal Allergies    .  Past Medical Hx:   Past Medical History:   Diagnosis Date     Allergic rhinitis      Cancer (H)     stomach cancer     Depression      Lumbago      Migraine      Other chronic pain     low back     Sacroiliitis (H)     low back pain     Trochanteric bursitis     leg length discrrepancy, hip pain      Baseline Mental status: WDL  Current Mental Status changes: at basesline    Infection present or suspected this encounter: cultures pending  Sepsis suspected: No  Isolation type: Special Precautions-COVID-19     Activity level - Baseline/Home:  Independent  Activity Level - Current:   Independent    Bariatric equipment needed?: No    In the ED these meds were given:   Medications   0.9% sodium chloride BOLUS (1,000 mLs Intravenous New Bag 6/15/20 1501)   HYDROmorphone (DILAUDID) injection 1 mg (1 mg Intravenous Given 6/15/20 1502)   ceFEPIme (MAXIPIME) 2 g vial to attach to  ml bag for ADULTS or 50 ml bag for PEDS (has no administration in time range)   vancomycin (VANCOCIN) 2,000 mg in sodium  "chloride 0.9 % 500 mL intermittent infusion (has no administration in time range)   ondansetron (ZOFRAN) injection 4 mg (4 mg Intravenous Given 6/15/20 1500)       Drips running?  No    Home pump  No    Current LDAs  Port A Cath Single 03/26/20 Right Chest wall (Active)   Access Date 06/15/20 06/15/20 1404   Access Attempts 1 06/15/20 1404   Gauge 20 gauge;3/4 inch 06/15/20 1404   Site Assessment WDL 06/15/20 1404   Line Status Blood return noted;Saline locked 06/15/20 1404   Number of days: 81       Incision/Surgical Site 01/28/20 Abdomen (Active)   Number of days: 139       Incision/Surgical Site 03/26/20 Right;Upper Chest (Active)   Number of days: 81       Labs results:   Labs Ordered and Resulted from Time of ED Arrival Up to the Time of Departure from the ED   CBC WITH PLATELETS DIFFERENTIAL - Abnormal; Notable for the following components:       Result Value    WBC 27.5 (*)     RBC Count 3.68 (*)     Hemoglobin 9.6 (*)     Hematocrit 31.7 (*)     MCH 26.1 (*)     MCHC 30.3 (*)     RDW 18.7 (*)     Platelet Count 143 (*)     Absolute Neutrophil 25.8 (*)     All other components within normal limits   COMPREHENSIVE METABOLIC PANEL - Abnormal; Notable for the following components:    Calcium 8.1 (*)     Albumin 2.6 (*)     Protein Total 5.9 (*)     Alkaline Phosphatase 186 (*)     AST 46 (*)     All other components within normal limits   LACTIC ACID WHOLE BLOOD   BLOOD CULTURE       Imaging Studies: No results found for this or any previous visit (from the past 24 hour(s)).    Recent vital signs:   /50   Temp 100.3  F (37.9  C) (Oral)   Resp 16   Ht 1.753 m (5' 9\")   Wt 79.4 kg (175 lb)   LMP 09/23/2014   SpO2 97%   BMI 25.84 kg/m      [place] Coma Scale Score: 15 (06/15/20 1500)       Cardiac Rhythm: Tachycardia  Pt needs tele? No  Skin/wound Issues: None    Code Status: Full Code    Pain control: fair    Nausea control: fair    Abnormal labs/tests/findings requiring intervention: see epic "     Family present during ED course? No   Family Comments/Social Situation comments:  aware    Tasks needing completion: None    Grover Tipton, RN  [phone] [facility]

## 2020-06-15 NOTE — ED NOTES
Bed: IN02  Expected date:   Expected time:   Means of arrival:   Comments:  Dena Bashir MRN: [id number]  Hx gastric adenocarcinoma, last chemo treatment 6/11  Reports fever 100.1 and sore/swollen throat, headache.  Coming from home, ETA [phone]

## 2020-06-15 NOTE — PHARMACY-VANCOMYCIN DOSING SERVICE
Pharmacy Vancomycin Initial Note  Date of Service Mimi 15, 2020  Patient's  1965  54 year old, female    Indication: Sepsis    Current estimated CrCl = Estimated Creatinine Clearance: 104.6 mL/min (based on SCr of 0.78 mg/dL).    Creatinine for last 3 days  6/15/2020:  2:05 PM Creatinine 0.78 mg/dL    Recent Vancomycin Level(s) for last 3 days  No results found for requested labs within last 72 hours.      Vancomycin IV Administrations (past 72 hours)      No vancomycin orders with administrations in past 72 hours.                Nephrotoxins and other renal medications (From now, onward)    Start     Dose/Rate Route Frequency Ordered Stop    20 0400  vancomycin 1500 mg in 0.9% NaCl 250 ml intermittent infusion 1,500 mg      1,500 mg  over 90 Minutes Intravenous EVERY 12 HOURS 06/15/20 1547      06/15/20 1518  vancomycin (VANCOCIN) 2,000 mg in sodium chloride 0.9 % 500 mL intermittent infusion      25 mg/kg × 79.4 kg  over 2 Hours Intravenous ONCE 06/15/20 1517            Contrast Orders - past 72 hours (72h ago, onward)    None                Plan:  1.  Start vancomycin  2000 mg IV once (25mg/kg per MD order) followed by 1500mg  q12h.   2.  Goal Trough Level: 15-20 mg/L   3.  Pharmacy will check trough levels as appropriate in 1-3 Days.    4. Serum creatinine levels will be ordered daily for the first week of therapy and at least twice weekly for subsequent weeks.    5. [place] method utilized to dose vancomycin therapy: Method 2    Jorge SánchezD, [facility]  Inpatient clinical pharmacist

## 2020-06-15 NOTE — TELEPHONE ENCOUNTER
"Pt received Cycle 5 Day 1 of Taxotere, Oxaliplatin, Leucovorin, and Fluorouracil pump connect on 6/11.  Pt CO mild fever (100.1 currently), swollen throat glands, and \"major HA\". Pt states she was \"really phlegmy this morning so I threw up too\", this is the only time Pt has had emesis. Pt denies cough or SOB, other pain or rash, no issues with hydration, nausea, or elimination. Pt has not used any medication for the fever. Pt wonders what we advise.  Paged Roxana Sweet.  Per Roxana Pt should see Caron BOLAND today with a CBC & CMP. Called and spoke with Pt, She does not have a ride at that time and does not feel like she can drive herself.  Per Roxana, due to Pt HA being severe enough to prevent driving, Pt should go to ED for evaluation.  Called and relayed information to Pt, who verbalized understanding. Will go to [facility] ETA [phone]. Called ahead to [facility] ED and gave report to charge RN.  "

## 2020-06-15 NOTE — ED TRIAGE NOTES
54 yr old female hx stomach cancer, currently receiving chemo -- last infusion this past Thursday. Pt reports this morning noticed a sore throat, n/v, and fevers -- tmax 100.1 @ home. Called nurse triage line and told to come to ED.

## 2020-06-16 LAB
ALBUMIN SERPL-MCNC: 2.2 G/DL (ref 3.4–5)
ALP SERPL-CCNC: 166 U/L (ref 40–150)
ALT SERPL W P-5'-P-CCNC: 36 U/L (ref 0–50)
ANION GAP SERPL CALCULATED.3IONS-SCNC: 6 MMOL/L (ref 3–14)
ANISOCYTOSIS BLD QL SMEAR: SLIGHT
AST SERPL W P-5'-P-CCNC: 37 U/L (ref 0–45)
BASOPHILS # BLD AUTO: 0 10E9/L (ref 0–0.2)
BASOPHILS NFR BLD AUTO: 0 %
BILIRUB SERPL-MCNC: 0.5 MG/DL (ref 0.2–1.3)
BUN SERPL-MCNC: 10 MG/DL (ref 7–30)
CALCIUM SERPL-MCNC: 8 MG/DL (ref 8.5–10.1)
CHLORIDE SERPL-SCNC: 110 MMOL/L (ref 94–109)
CO2 SERPL-SCNC: 26 MMOL/L (ref 20–32)
CREAT SERPL-MCNC: 0.74 MG/DL (ref 0.52–1.04)
DEPRECATED S PYO AG THROAT QL EIA: NEGATIVE
DIFFERENTIAL METHOD BLD: ABNORMAL
EOSINOPHIL # BLD AUTO: 0.1 10E9/L (ref 0–0.7)
EOSINOPHIL NFR BLD AUTO: 0.9 %
ERYTHROCYTE [DISTWIDTH] IN BLOOD BY AUTOMATED COUNT: 18.7 % (ref 10–15)
GFR SERPL CREATININE-BSD FRML MDRD: >90 ML/MIN/{1.73_M2}
GLUCOSE SERPL-MCNC: 86 MG/DL (ref 70–99)
HCT VFR BLD AUTO: 27 % (ref 35–47)
HGB BLD-MCNC: 8.1 G/DL (ref 11.7–15.7)
LYMPHOCYTES # BLD AUTO: 1.8 10E9/L (ref 0.8–5.3)
LYMPHOCYTES NFR BLD AUTO: 11.3 %
MAGNESIUM SERPL-MCNC: 1.9 MG/DL (ref 1.6–2.3)
MCH RBC QN AUTO: 26.3 PG (ref 26.5–33)
MCHC RBC AUTO-ENTMCNC: 30 G/DL (ref 31.5–36.5)
MCV RBC AUTO: 88 FL (ref 78–100)
METAMYELOCYTES # BLD: 0.3 10E9/L
METAMYELOCYTES NFR BLD MANUAL: 1.7 %
MONOCYTES # BLD AUTO: 0 10E9/L (ref 0–1.3)
MONOCYTES NFR BLD AUTO: 0 %
MYELOCYTES # BLD: 0.1 10E9/L
MYELOCYTES NFR BLD MANUAL: 0.9 %
NEUTROPHILS # BLD AUTO: 13.9 10E9/L (ref 1.6–8.3)
NEUTROPHILS NFR BLD AUTO: 85.2 %
NRBC # BLD AUTO: 0.1 10*3/UL
NRBC BLD AUTO-RTO: 1 /100
PHOSPHATE SERPL-MCNC: 3.7 MG/DL (ref 2.5–4.5)
PLATELET # BLD AUTO: 102 10E9/L (ref 150–450)
PLATELET # BLD EST: ABNORMAL 10*3/UL
POTASSIUM SERPL-SCNC: 3.4 MMOL/L (ref 3.4–5.3)
PROT SERPL-MCNC: 5.1 G/DL (ref 6.8–8.8)
RBC # BLD AUTO: 3.08 10E12/L (ref 3.8–5.2)
SODIUM SERPL-SCNC: 142 MMOL/L (ref 133–144)
SPECIMEN SOURCE: NORMAL
SPECIMEN SOURCE: NORMAL
STREP GROUP A PCR: NOT DETECTED
WBC # BLD AUTO: 16.3 10E9/L (ref 4–11)

## 2020-06-16 PROCEDURE — 40000893 ZZH STATISTIC PT IP EVAL DEFER: Performed by: REHABILITATION PRACTITIONER

## 2020-06-16 PROCEDURE — 36592 COLLECT BLOOD FROM PICC: CPT | Performed by: INTERNAL MEDICINE

## 2020-06-16 PROCEDURE — 25800030 ZZH RX IP 258 OP 636: Performed by: INTERNAL MEDICINE

## 2020-06-16 PROCEDURE — 85025 COMPLETE CBC W/AUTO DIFF WBC: CPT | Performed by: INTERNAL MEDICINE

## 2020-06-16 PROCEDURE — 25000125 ZZHC RX 250: Performed by: INTERNAL MEDICINE

## 2020-06-16 PROCEDURE — 83735 ASSAY OF MAGNESIUM: CPT | Performed by: INTERNAL MEDICINE

## 2020-06-16 PROCEDURE — 84100 ASSAY OF PHOSPHORUS: CPT | Performed by: INTERNAL MEDICINE

## 2020-06-16 PROCEDURE — 80053 COMPREHEN METABOLIC PANEL: CPT | Performed by: INTERNAL MEDICINE

## 2020-06-16 PROCEDURE — 99232 SBSQ HOSP IP/OBS MODERATE 35: CPT | Mod: GC | Performed by: INTERNAL MEDICINE

## 2020-06-16 PROCEDURE — 25000132 ZZH RX MED GY IP 250 OP 250 PS 637: Mod: GY | Performed by: STUDENT IN AN ORGANIZED HEALTH CARE EDUCATION/TRAINING PROGRAM

## 2020-06-16 PROCEDURE — 25000132 ZZH RX MED GY IP 250 OP 250 PS 637: Mod: GY | Performed by: INTERNAL MEDICINE

## 2020-06-16 PROCEDURE — 25000128 H RX IP 250 OP 636: Performed by: INTERNAL MEDICINE

## 2020-06-16 PROCEDURE — 87651 STREP A DNA AMP PROBE: CPT | Performed by: INTERNAL MEDICINE

## 2020-06-16 PROCEDURE — 40001204 ZZHCL STATISTIC STREP A RAPID: Performed by: INTERNAL MEDICINE

## 2020-06-16 PROCEDURE — 12000001 ZZH R&B MED SURG/OB UMMC

## 2020-06-16 RX ORDER — DOXEPIN HYDROCHLORIDE 10 MG/ML
10 SOLUTION ORAL 2 TIMES DAILY
Status: DISCONTINUED | OUTPATIENT
Start: 2020-06-16 | End: 2020-06-17 | Stop reason: HOSPADM

## 2020-06-16 RX ORDER — LOPERAMIDE HCL 2 MG
2 CAPSULE ORAL ONCE
Status: DISCONTINUED | OUTPATIENT
Start: 2020-06-16 | End: 2020-06-17 | Stop reason: HOSPADM

## 2020-06-16 RX ORDER — DIPHENHYDRAMINE HYDROCHLORIDE AND LIDOCAINE HYDROCHLORIDE AND ALUMINUM HYDROXIDE AND MAGNESIUM HYDRO
10 KIT EVERY 6 HOURS PRN
Status: DISCONTINUED | OUTPATIENT
Start: 2020-06-16 | End: 2020-06-17 | Stop reason: HOSPADM

## 2020-06-16 RX ORDER — SALIVA STIMULANT COMB. NO.3
1 SPRAY, NON-AEROSOL (ML) MUCOUS MEMBRANE 4 TIMES DAILY PRN
Status: DISCONTINUED | OUTPATIENT
Start: 2020-06-16 | End: 2020-06-17 | Stop reason: HOSPADM

## 2020-06-16 RX ADMIN — DOXEPIN HYDROCHLORIDE 10 MG: 10 SOLUTION ORAL at 20:15

## 2020-06-16 RX ADMIN — FAMOTIDINE 20 MG: 20 TABLET, FILM COATED ORAL at 20:14

## 2020-06-16 RX ADMIN — LORAZEPAM 0.5 MG: 0.5 TABLET ORAL at 09:13

## 2020-06-16 RX ADMIN — TRAMADOL HYDROCHLORIDE 50 MG: 50 TABLET, FILM COATED ORAL at 09:13

## 2020-06-16 RX ADMIN — AMITRIPTYLINE HYDROCHLORIDE 75 MG: 75 TABLET, FILM COATED ORAL at 21:51

## 2020-06-16 RX ADMIN — Medication 1 SPRAY: at 13:55

## 2020-06-16 RX ADMIN — TRAMADOL HYDROCHLORIDE 50 MG: 50 TABLET, FILM COATED ORAL at 15:40

## 2020-06-16 RX ADMIN — LORAZEPAM 0.5 MG: 0.5 TABLET ORAL at 20:46

## 2020-06-16 RX ADMIN — DIPHENHYDRAMINE HYDROCHLORIDE AND LIDOCAINE HYDROCHLORIDE AND ALUMINUM HYDROXIDE AND MAGNESIUM HYDRO 10 ML: KIT at 08:47

## 2020-06-16 RX ADMIN — SODIUM CHLORIDE, POTASSIUM CHLORIDE, SODIUM LACTATE AND CALCIUM CHLORIDE: 600; 310; 30; 20 INJECTION, SOLUTION INTRAVENOUS at 07:38

## 2020-06-16 RX ADMIN — TRAMADOL HYDROCHLORIDE 50 MG: 50 TABLET, FILM COATED ORAL at 21:51

## 2020-06-16 RX ADMIN — SODIUM CHLORIDE, POTASSIUM CHLORIDE, SODIUM LACTATE AND CALCIUM CHLORIDE: 600; 310; 30; 20 INJECTION, SOLUTION INTRAVENOUS at 18:13

## 2020-06-16 RX ADMIN — FAMOTIDINE 20 MG: 20 TABLET, FILM COATED ORAL at 08:46

## 2020-06-16 RX ADMIN — ONDANSETRON 8 MG: 2 INJECTION INTRAMUSCULAR; INTRAVENOUS at 18:47

## 2020-06-16 RX ADMIN — MULTIPLE VITAMINS W/ MINERALS TAB 1 TABLET: TAB at 08:46

## 2020-06-16 RX ADMIN — LORAZEPAM 0.5 MG: 0.5 TABLET ORAL at 16:43

## 2020-06-16 RX ADMIN — CITALOPRAM HYDROBROMIDE 20 MG: 20 TABLET ORAL at 08:46

## 2020-06-16 RX ADMIN — PANTOPRAZOLE SODIUM 40 MG: 40 TABLET, DELAYED RELEASE ORAL at 20:14

## 2020-06-16 RX ADMIN — OLANZAPINE 5 MG: 5 TABLET, ORALLY DISINTEGRATING ORAL at 20:49

## 2020-06-16 RX ADMIN — PANTOPRAZOLE SODIUM 40 MG: 40 TABLET, DELAYED RELEASE ORAL at 08:46

## 2020-06-16 RX ADMIN — ENOXAPARIN SODIUM 40 MG: 40 INJECTION SUBCUTANEOUS at 08:46

## 2020-06-16 RX ADMIN — LIDOCAINE HYDROCHLORIDE 5 ML: 20 SOLUTION ORAL; TOPICAL at 17:21

## 2020-06-16 ASSESSMENT — PAIN DESCRIPTION - DESCRIPTORS
DESCRIPTORS: SORE

## 2020-06-16 ASSESSMENT — ACTIVITIES OF DAILY LIVING (ADL)
ADLS_ACUITY_SCORE: 10

## 2020-06-16 ASSESSMENT — MIFFLIN-ST. JEOR: SCORE: 1479.75

## 2020-06-16 NOTE — CONSULTS
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    Summary of Recommendations:    She usually has increased sensitivity of oral area after chemotherapy.  However at this time, the pain is worse and is more continuous compared to what it was before.    She was admitted to inpatient service and started on Magic mouthwash, viscous lidocaine for management of presumed mucositis.  She did not like the taste of mouthwash and lidocaine and was not able to take it.  She also endorses that the pain is lower, more esophagitis type.  This looks to be stage II.    She was not neutropenic recently, she has preserved oral mucosa without any lesions visible and no thrush is seen.  The suspicion for fungal or viral infection is low.    We will start her on doxepin oral solution and biotin. We also  encouraged her to take MMF and  tramadol half an hour before a meal to help with odynophagia.    Thank you for involving us in the care of this patient. We will continue to follow during the hospitalization.    Patient was seen and plan of care developed with Dr. Acuna.    Dalton Queen MD  Heme/Onc Fellow  06/16/2020  [phone]         History of Present Illness:   History obtained from chart review and confirmed with patient.    Nathalie Bashir is a 54 year old patient with history of signet ring gastric adenocarcinoma on active treatment with FLOT, who received her C5 chemotherapy on 6/11. She had some oral sensitivity, common with previous chemotherapy cycles. However, on Friday she did not feel well. She took  lorazepam which normally helps her with cold sensitivity but it did not alleviate her symptoms.  On Saturday, she felt a new sore throat associated with generalized oral pain.  She developed dysphagia to both solids and liquid intake and it was hard for her to swallow anything.  She endorses taste buds have also been off.   She was admitted for these reasons and started on Magic mouthwash, lidocaine gel.  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 It was a pT4aN0 lesion, diffuse within the gastric wall with positive proximal gastric mucosal and serosal margins, negative other margins, positive lymphovascular as well as perineural invasion and all 22 lymph nodes were negative. She was started on neoadjuvant chemotherapy with FLOT (5FU, Oxaliplatin, Docetaxel) C1D1 3/27/20, C2D1 4/20, C3D1 on 5/4, C4D1 on 5/27, C5D1 on 6/11 with pump disconnect on 6/12. C2 was delayed due to pneumonia and C4 due to neutropenia.                                         Restaging PET/CT 6/3/20 demonstrated almost complete resolution of soft tissue nodule next to the pancreaticoduodenal anastomosis without FDG uptake with mild residual FDG uptake at the site of gastrojejunal anastomosis which was deemed to be likely physiologic. She was last seen by Dr. Pollard in the clinic on 6/4. It was decided for her to complete 4 more cycles (since the chemotherapy usually is not tolerated well after surgery)  and a post-treatment PET/CT possibly followed by surgery.                                                                                                     Patient most recently underwent C5D1 on 6/11.        Review of Systems:    A comprehensive ROS was performed with the patient and was found to be negative with the exception of that noted in the HPI above.       Past Medical History:     Past Medical History:   Diagnosis Date     Allergic rhinitis      Cancer (H)     stomach cancer     Depression      Lumbago      Migraine      Other chronic pain     low back     Sacroiliitis (H)     low back pain     Trochanteric bursitis     leg length discrrepancy, hip pain            Past Surgical History:     Past Surgical History:   Procedure Laterality Date     ARTHROPLASTY HIP Left 2016     BACK SURGERY      L4-5 decompression     C REPAIR DETACH RETINA,SCLERAL BUCKLE       CATARACT IOL, RT/LT       CHOLECYSTECTOMY N/A 1/28/2020    Procedure: Cholecystectomy;  Surgeon: Yandel Mallory MD;  Location: [facility] OR     ESOPHAGOSCOPY, GASTROSCOPY, DUODENOSCOPY (EGD), COMBINED N/A 3/3/2020    Procedure: ESOPHAGOGASTRODUODENOSCOPY, WITH ENDOSCOPIC US (enoxaparin);  Surgeon: Bakari Plata MD;  Location: [facility]     EYE SURGERY       IR CHEST PORT PLACEMENT > 5 YRS OF AGE  3/26/2020     OPEN REDUCTION INTERNAL FIXATION RODDING INTRAMEDULLARY FEMUR Left 12/23/2014    Procedure: OPEN REDUCTION INTERNAL FIXATION RODDING INTRAMEDULLARY FEMUR;  Surgeon: Arnol Santiago MD;  Location: [facility] OR     ORTHOPEDIC SURGERY       PICC DOUBLE LUMEN PLACEMENT Right 02/07/2020    5 fr double lumen 41 cm     REMOVE HARDWARE LOWER EXTREMITY Left 4/7/2015    Procedure: REMOVE HARDWARE LOWER EXTREMITY;  Surgeon: Arnol Santiago MD;  Location: [facility] OR     REMOVE HARDWARE RODDING INTRAMEDULLARY FEMUR Left 12/17/2015    Procedure: REMOVE HARDWARE RODDING INTRAMEDULLARY FEMUR;  Surgeon: Arnol Santiago MD;  Location: [facility] OR     RESECT BONE LOWER EXTREMITY Left 12/23/2014    Procedure: RESECT BONE LOWER EXTREMITY;  Surgeon: Arnol Santiago MD;  Location: [facility] OR     WHIPPLE PROCEDURE N/A 1/28/2020    Procedure: Open Whipple procedure and Cholecystectomy;  Surgeon: Mohamud  MD Yandel;  Location:  OR            Social History:     Social History     Socioeconomic History     Marital status:      Spouse name: Not on file     Number of children: Not on file     Years of education: Not on file     Highest education level: Not on file   Occupational History     Not on file   Social Needs     Financial resource strain: Not on file     Food insecurity     Worry: Not on file     Inability: Not on file     Transportation needs     Medical: Not on file     Non-medical: Not on file   Tobacco Use     Smoking status: Former Smoker     Packs/day: 0.50     Years: 25.00     Pack years: 12.50     Types: Cigarettes     Last attempt to quit: 2020     Years since quittin.4     Smokeless tobacco: Never Used   Substance and Sexual Activity     Alcohol use: No     Drug use: No     Sexual activity: Not on file   Lifestyle     Physical activity     Days per week: Not on file     Minutes per session: Not on file     Stress: Not on file   Relationships     Social connections     Talks on phone: Not on file     Gets together: Not on file     Attends [employer] service: Not on file     Active member of club or organization: Not on file     Attends meetings of clubs or organizations: Not on file     Relationship status: Not on file     Intimate partner violence     Fear of current or ex partner: Not on file     Emotionally abused: Not on file     Physically abused: Not on file     Forced sexual activity: Not on file   Other Topics Concern     Parent/sibling w/ CABG, MI or angioplasty before 65F 55M? Not Asked   Social History Narrative     Not on file            Family History:     Family History   Problem Relation Age of Onset     Heart Failure Mother 77     Anesthesia Reaction No family hx of      Deep Vein Thrombosis No family hx of             Allergies:     Allergies   Allergen Reactions     Gluten Meal Palpitations     Augmentin Rash     Oxycontin [Oxycodone]      Confusion, loopy, oxycodone  is tolerated     Pregabalin      interfered with Cymbalta     Topiramate      Tongue numbness, taste alteration, irritable      Acetaminophen Nausea     Urine retention       [place] Extract      Gabapentin Dizziness and GI Disturbance     Latex Rash     Methadone Fatigue     Mold      Naprosyn [Naproxen] Dizziness and GI Disturbance     Oxymetholone      Seasonal Allergies             Medications:     Medications Prior to Admission   Medication Sig Dispense Refill Last Dose     citalopram (CELEXA) 20 MG tablet Take 20 mg by mouth every morning    6/15/2020 at Unknown time     famotidine (PEPCID) 20 MG tablet Take 1 tablet (20 mg) by mouth 2 times daily 60 tablet 3 6/15/2020 at Unknown time     fluticasone (FLONASE) 50 MCG/ACT nasal spray Spray 2 sprays into both nostrils daily as needed    Past Month at Unknown time     hydrOXYzine (ATARAX) 10 MG tablet Take 10 mg by mouth 3 times daily as needed for itching   Past Month at Unknown time     LORazepam (ATIVAN) 0.5 MG tablet Take 1 tablet (0.5 mg) by mouth every 4 hours as needed (Anxiety, Nausea/Vomiting or Sleep) 30 tablet 5 6/15/2020 at 1000     meclizine (ANTIVERT) 25 MG tablet Take 1 tablet (25 mg) by mouth 3 times daily as needed for dizziness 30 tablet 0 Past Week at Unknown time     OLANZapine zydis (ZYPREXA) 5 MG ODT Take 1 tablet (5 mg) by mouth At Bedtime 30 tablet 0 6/14/2020 at Unknown time     ondansetron (ZOFRAN) 8 MG tablet Take 1 tablet (8 mg) by mouth every 8 hours as needed (Nausea/Vomiting) 90 tablet 3 6/14/2020 at Unknown time     ondansetron (ZOFRAN-ODT) 4 MG ODT tab Take 1 tablet (4 mg) by mouth every 6 hours as needed for nausea or vomiting 90 tablet 3 6/15/2020 at Unknown time     pantoprazole (PROTONIX) 40 MG EC tablet Take 1 tablet (40 mg) by mouth 2 times daily 60 tablet 1 6/15/2020 at Unknown time     simethicone 80 MG TABS Take 1 tablet by mouth every 6 hours as needed (bloating, gas, belching) 90 tablet 3 6/15/2020 at Unknown time      "SUMAtriptan (IMITREX) 100 MG tablet Take 100 mg by mouth as needed   6/15/2020 at Unknown time     traMADol (ULTRAM) 50 MG tablet Take 1 tablet (50 mg) by mouth every 6 hours as needed for severe pain 60 tablet 0 6/14/2020 at Unknown time     amitriptyline (ELAVIL) 25 MG tablet Take 3 tablets by mouth At Bedtime        calcium carb 1250 mg, 500 mg [place],/vitamin D 200 units (OSCAL WITH D) 500-200 MG-UNIT per tablet Take 1 tablet by mouth 3 times daily (with meals)   More than a month at Unknown time     dexamethasone (DECADRON) 4 MG tablet Take 1 tablet (4mg) the day before, day of, and 2 days after chemotherapy 12 tablet 3      diclofenac (FLECTOR) 1.3 % patch Place 1 patch onto the skin daily as needed    More than a month at Unknown time     multivitamin, therapeutic with minerals (MULTI-VITAMIN) TABS Take 1 tablet by mouth daily   More than a month at Unknown time     senna-docusate (SENOKOT-S/PERICOLACE) 8.6-50 MG tablet Take 1 tablet by mouth daily as needed for constipation 30 tablet 0 More than a month at Unknown time     tiZANidine (ZANAFLEX) 4 MG tablet Take 1 tablet by mouth 2 times daily as needed   More than a month at Unknown time            Physical Exam:   /53 (BP Location: Right arm)   Pulse 88   Temp 98.4  F (36.9  C) (Oral)   Resp 18   Ht 1.753 m (5' 9.02\")   Wt 81 kg (178 lb 9.6 oz)   LMP 09/23/2014   SpO2 95%   BMI 26.36 kg/m    Vitals:    06/15/20 1354 06/15/20 1813 06/16/20 1013   Weight: 79.4 kg (175 lb) 80.1 kg (176 lb 11.1 oz) 81 kg (178 lb 9.6 oz)     General: Appears well, sitting in bed, in no acute distress.  Heme/Lymph: No overt bleeding. No cervical, axillary, or supraclavicular adenopathy.  Skin: No concerning lesions, rash, jaundice, cyanosis, erythema, or ecchymoses on exposed surfaces.  HEENT: NCAT. EOMI, anicteric sclera. Oral mucosa pink and moist with no lesions or thrush.  Respiratory: Non-labored breathing, good air exchange, lungs clear to auscultation " bilaterally.  Cardiovascular: Regular rate and rhythm. No murmur or rub.   Gastrointestinal: Normoactive bowel sounds. Abdomen soft, non-distended, and non-tender. No palpable masses or organomegaly.  Neurologic: A&O x 3, speech normal, sensation to light touch grossly WNL.         Data:   I have personally reviewed the following labs/imaging:  CBC  Recent Labs   Lab 06/16/20  0603 06/15/20  1405 06/10/20  0955   WBC 16.3* 27.5* 9.8   RBC 3.08* 3.68* 3.73*   HGB 8.1* 9.6* 9.8*   HCT 27.0* 31.7* 32.8*   MCV 88 86 88   MCH 26.3* 26.1* 26.3*   MCHC 30.0* 30.3* 29.9*   RDW 18.7* 18.7* 18.1*   * 143* 186     CMP  Recent Labs   Lab 06/16/20  0603 06/15/20  1405 06/10/20  0955    143 142   POTASSIUM 3.4 3.5 3.0*   CHLORIDE 110* 109 109   CO2 26 26 26   ANIONGAP 6 7 7   GLC 86 92 130*   BUN 10 12 10   CR 0.74 0.78 0.87   GFRESTIMATED >90 86 75   GFRESTBLACK >90 >90 87   PETRA 8.0* 8.1* 8.6   MAG 1.9  --   --    PHOS 3.7  --   --    PROTTOTAL 5.1* 5.9* 6.6*   ALBUMIN 2.2* 2.6* 3.0*   BILITOTAL 0.5 0.5 0.3   ALKPHOS 166* 186* 172*   AST 37 46* 48*   ALT 36 44 43

## 2020-06-16 NOTE — PLAN OF CARE
VSS,pt. is alert and oriented x4,up indep.to bathroom ,pt reported adequate urinary output and one BM,pt. tolerated well regular diet,c/o sore throat and back pain tramadol given ,pt. also requested ativan for anxiety. LS clear,BS+,LR  Infusing at 100 ml's/hr.Will continue to monitor.

## 2020-06-16 NOTE — PLAN OF CARE
PT-7D- PT Consult Order received, per chart review and communication with interdisciplinary care team, and discussion with patient. Pt declines PT, pt reports she is IND with ambulation to bathroom, IND with ambulation in hallways and is hopeful for home discharge today. PT will defer evaluation, please re-consult should new needs arise.

## 2020-06-16 NOTE — PLAN OF CARE
VSS. Tmax 99.2. A&Ox4 Denies nausea. Reports pain, declines intervention. Strep swab sent. LR infusing at 100ml/hr. Continue with plan of care.

## 2020-06-16 NOTE — H&P
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      #Chemotherapy-related mucositis  #Odynophagia  #Sore throat  Patient was admitted with progressively worsening sore throat, generalized oral pain and cold sensitivity, dysphagia and loss of taste sensation in the setting of recent C5D1 chemotherapy on 6/11/20. She has had decreased PO solid /liquid intake due to the mouth/throat symptoms. Physical exam is consistent with mucositis with no oral ulcers or oropharyngeal candidiasis.   - Etiology is most likely related to chemotherapy. CT neck done in the ED to rule out a deep neck abscess was negative for evident mass, abscess or cervical adenopathy.   - MMW scheduled 4 times daily AC and HS.  - Salt and soda rinses.  - Viscous lidocaine 2% 10 mL PRN Q3H (swish and swallow).  - Cepacol lozenges PRN.  - Consider morphine solution PRN.  - Sense of taste is expected to improve with the above but we can consider a course of zinc sulfate 220 mg x 2-3 weeks.   - Ordered streptococcal A rapid screen.  - Discontinued vancomycin and cefepime initiated in the ED. Observe clinically and monitor temperature trend off abx therapy (patient had a low grade temp of 100.3 in the ED). Leucocytosis is related to neulasta following last chemotherapy (see below) rather than an occult systemic infection. Patient is not neutropenic. No lactic acidosis. We can consider ampicillin/tazobactam vs. augmentin with  recurrent fevers.   - Consider oncology consult (ordered not placed).     #Leucocytosis  #Mild thrombocytopenia  Leucocytosis is related to Neulasta injection which patient received 6/13/20. Since it is pegylated, it can expectantly be within the body for up to 7 days. No concern for an occult systemic infection.   - Monitor CBC daily.  - Follow BC sent in the ED. SARS-CoV-2 was negative on admission 6/15/20.  - Transfuse for plts<10k (plt count=143 on admission compared to 186 on 6/10/20).      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 It was a pT4aN0 lesion, diffuse within the gastric wall with positive proximal gastric mucosal and serosal margins, negative other margins, positive lymphovascular as well as perineural invasion and all 22 lymph nodes were negative. She was started on neoadjuvant chemotherapy with FLOT (5FU, Oxaliplatin, Docetaxel) C1D1 3/27/20, C2D1 4/20, C3D1 on 5/4, C4D1 on 5/27, C5D1 on 6/11 with pump disconnect on 6/12. C2 was delayed due to pneumonia and C4 due to neutropenia.  - Restaging PET/CT 6/3/20 demonstrated almost complete resolution of soft tissue nodule next to the pancreaticoduodenal anastomosis without FDG uptake with mild residual FDG uptake at the site of gastrojejunal anastomosis which was deemed to be likely physiologic.   - She was last seen by Dr. Pollard in the clinic on 6/4. It was decided for her to complete 4 more cycles and a post-treatment PET/CT possibly followed by surgery.   - Patient most recently underwent C5D1 on 6/11.      #Nausea and vomiting  #Gastritis  #Acid reflux  # Likely delayed gastric emptying s/p Whipple   Pt had undergone EUS on 3/3/20 as part of the work-up of the gastric  carcinoma with biopsy from the gastric jejunal anastomosis as well as lesser curvature demonstrating gastropathy with mild inflammation/erosion with no evidence of H. Pylori, intestinal metaplasia/dysplasia, or malignancy. Pt continues to have nausea off and on in relation to her acid reflux and dyspepsia. Palliative care team was consulted during last [facility] admission from 05/2020 and adjusted her medications with great and ongoing symptom relief per the patient.   - Continue pantoprazole 40 mg BID.  - Continue famotidine 20 mg BID.  - Continue olanzapine 5 mg HS.  - Continue PRN simethicone, lorazepam, Tums and tramadol.   - Patient was last seen in the palliative clinic on 6/10 by Dr. Scott.     #Hx of aspiration pneumonia/pneumonitis  Patient was admitted to [facility] from 5/21-5/23/20 for aspiration pneumonia/pneumonitis for which she was treated with a 5 day-course of clindamycin.   - No current respiratory signs/symptoms.    #Chemotherapy-related neuropathy  - Continue amitriptyline 75 mg daily.     #Depression  - Continue citalopram 20 mg daily.     #Migraine  - Continue PRN sumatriptan.    FEN:   - Regular diet as tolerated  - LR at 100 mL/hr  - PRN lyte replacement per standing protocol      Prophylaxis  - Enoxaparin. Discontinue if plt count<50k.  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     Patient received her left  "chemotherapy on 6/11.  That day she felt oral sensitivity/numbness which she usually does after getting chemo treatments.  However, on Friday she did not feel well. She took lorazepam which normally helps her with cold sensitivity but it did not alleviate her symptoms.  On Saturday, she felt a new sore throat associated with generalized oral pain.  She developed dysphagia to both solids and liquid intake and it was hard for her to swallow anything.  She related that her \" taste buds have also been off\".  She would normally have dysgeusia after chemo treatments but this time it has been more protracted and severe.  Patient spitted white mucus from the back of her throat multiple times all weekend.  ROS was positive for mild headache, runny nose attributed to seasonal allergies, relatively stable malignancy related abdominal pain and was otherwise negative for sinus pain/pressure, chest pain, shortness of breath, cough, sputum, diarrhea, urinary symptoms or new skin lesions.  She continues to have nausea, vomiting, and symptoms related to gastritis but the symptoms remain better controlled since her medication regimen in this regard was recently adjusted by palliative medicine team.           Review of Systems:     14-point review of systems was otherwise negative except as noted in HPI.          Past Medical History:   Past medical history was reviewed.   Past Medical History:   Diagnosis Date     Allergic rhinitis      Cancer (H)     stomach cancer     Depression      Lumbago      Migraine      Other chronic pain     low back     Sacroiliitis (H)     low back pain     Trochanteric bursitis     leg length discrrepancy, hip pain             Past Surgical History:   Past surgical history was reviewed.   Past Surgical History:   Procedure Laterality Date     ARTHROPLASTY HIP Left 2016     BACK SURGERY      L4-5 decompression     C REPAIR DETACH RETINA,SCLERAL BUCKLE       CATARACT IOL, RT/LT       CHOLECYSTECTOMY N/A " 2020    Procedure: Cholecystectomy;  Surgeon: Yandel Mallory MD;  [facility]     ESOPHAGOSCOPY, GASTROSCOPY, DUODENOSCOPY (EGD), COMBINED N/A 3/3/2020    Procedure: ESOPHAGOGASTRODUODENOSCOPY, WITH ENDOSCOPIC US (enoxaparin);  Surgeon: Bakari Plata MD;  [facility]: [facility]     EYE SURGERY       IR CHEST PORT PLACEMENT > 5 YRS OF AGE  3/26/2020     OPEN REDUCTION INTERNAL FIXATION RODDING INTRAMEDULLARY FEMUR Left 2014    Procedure: OPEN REDUCTION INTERNAL FIXATION RODDING INTRAMEDULLARY FEMUR;  Surgeon: Arnol Santiago MD;  Location: [facility] OR     ORTHOPEDIC SURGERY       PICC DOUBLE LUMEN PLACEMENT Right 2020    5 fr double lumen 41 cm     REMOVE HARDWARE LOWER EXTREMITY Left 2015    Procedure: REMOVE HARDWARE LOWER EXTREMITY;  Surgeon: Arnol Santiago MD;  Location: [facility] OR     REMOVE HARDWARE RODDING INTRAMEDULLARY FEMUR Left 2015    Procedure: REMOVE HARDWARE RODDING INTRAMEDULLARY FEMUR;  Surgeon: Arnol Santiago MD;  [facility]: [facility] OR     RESECT BONE LOWER EXTREMITY Left 2014    Procedure: RESECT BONE LOWER EXTREMITY;  Surgeon: Arnol Santiago MD;  Location: [facility] OR     WHIPPLE PROCEDURE N/A 2020    Procedure: Open Whipple procedure and Cholecystectomy;  Surgeon: Yandel Mallory MD;  Location:  OR             Social History:   Social history was reviewed.   Social History     Tobacco Use     Smoking status: Former Smoker     Packs/day: 0.50     Years: 25.00     Pack years: 12.50     Types: Cigarettes     Last attempt to quit: 2020     Years since quittin.4     Smokeless tobacco: Never Used   Substance Use Topics     Alcohol use: No             Family History:   Family history was reviewed.  Family History   Problem Relation Age of Onset     Heart Failure Mother 77     Anesthesia Reaction No family hx of      Deep Vein Thrombosis No family hx of              Allergies:     Allergies   Allergen Reactions     Gluten Meal Palpitations     Augmentin Rash  "    Oxycontin [Oxycodone]      Confusion, loopy, oxycodone is tolerated     Pregabalin      interfered with Cymbalta     Topiramate      Tongue numbness, taste alteration, irritable      Acetaminophen Nausea     Urine retention       Dust Mite Extract      Gabapentin Dizziness and GI Disturbance     Latex Rash     Methadone Fatigue     Mold      Naprosyn [Naproxen] Dizziness and GI Disturbance     Oxymetholone      Seasonal Allergies              Medications:   Medications Reviewed.   Current Facility-Administered Medications   Medication     [START ON 6/16/2020] vancomycin 1500 mg in 0.9% NaCl 250 ml intermittent infusion 1,500 mg     Facility-Administered Medications Ordered in Other Encounters   Medication     heparin 100 UNIT/ML injection 5 mL     sodium chloride (PF) 0.9% PF flush 10 mL             Physical Exam:   Vitals were reviewed.  Blood pressure 111/66, pulse 88, temperature 99.4  F (37.4  C), temperature source Oral, resp. rate 16, height 1.753 m (5' 9.02\"), weight 80.1 kg (176 lb 11.1 oz), last menstrual period 09/23/2014, SpO2 97 %, not currently breastfeeding.    Constitutional: awake, laying in bed, appears comfortable, in NAD  HEENT: Mucositis, no oral ulcers, tonsils unremarkable, no cervical LAD   Heart: RRR, no murmurs appreciated  Resp: Clear to auscultation bilaterally, breathing comfortably on RA, no wheezes, rhonchi  Abd: Soft, mildly tender LUQ, BS+  MSK: Warm, FROM, no joint swelling  Skin: no rash or lesions on limited exam  Neuro: CN2-12 grossly intact, no lateralizing symptoms or focal neurologic deficits             Data:   No intake/output data recorded.    ROUTINE LABS (Last four results)  CMP  Recent Labs   Lab 06/15/20  1405 06/10/20  0955    142   POTASSIUM 3.5 3.0*   CHLORIDE 109 109   CO2 26 26   ANIONGAP 7 7   GLC 92 130*   BUN 12 10   CR 0.78 0.87   GFRESTIMATED 86 75   GFRESTBLACK >90 87   PETRA 8.1* 8.6   PROTTOTAL 5.9* 6.6*   ALBUMIN 2.6* 3.0*   BILITOTAL 0.5 0.3 "   ALKPHOS 186* 172*   AST 46* 48*   ALT 44 43     CBC  Recent Labs   Lab 06/15/20  1405 06/10/20  0955   WBC 27.5* 9.8   RBC 3.68* 3.73*   HGB 9.6* 9.8*   HCT 31.7* 32.8*   MCV 86 88   MCH 26.1* 26.3*   MCHC 30.3* 29.9*   RDW 18.7* 18.1*   * 186     INRNo lab results found in last 7 days.  Arterial Blood GasNo lab results found in last 7 days.

## 2020-06-16 NOTE — PROGRESS NOTES
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      #Chemotherapy-related mucositis  #Sore throat   She has had decreased PO solid /liquid intake due to the mouth/throat symptoms. Physical exam is consistent with mucositis with no oral ulcers or oropharyngeal candidiasis.   - Etiology is most likely related to chemotherapy. CT neck done in the ED was negative for neck mass, abscess or cervical adenopathy.   - MMW scheduled 4 times daily AC and HS.  - Salt and soda rinses.  - Viscous lidocaine 2% 10 mL PRN Q3H (swish and swallow).  - Cepacol lozenges PRN.  - Consider morphine solution PRN.  - Sense of taste is expected to improve with the above but we can consider a course of zinc sulfate 220 mg x 2-3 weeks.   - streptococcal A rapid screen confirmation in process (negative preliminary).   - Leucocytosis is related to neulasta following last chemotherapy (see below) rather than an occult systemic infection. Patient is not neutropenic. No lactic acidosis. Empiric abx held on admission. We can consider ampicillin/tazobactam vs. augmentin with recurrent fevers.   - oncology consult      #Leucocytosis  #Mild thrombocytopenia  Leucocytosis is related to Neulasta injection which patient received 6/13/20. Since it is pegylated, it can expectantly be within the body for up to 7 days. No concern for an occult systemic infection.   - Monitor CBC daily.  - Follow BC sent in the ED. SARS-CoV-2 was negative on admission 6/15/20.  - Transfuse for plts<10k  (plt count=143 on admission compared to 186 on 6/10/20).       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 It was a pT4aN0 lesion, diffuse within the gastric wall with positive proximal gastric mucosal and serosal margins, negative other margins, positive lymphovascular as well as perineural invasion and all 22 lymph nodes were negative. She was started on neoadjuvant chemotherapy with FLOT (5FU, Oxaliplatin, Docetaxel) C1D1 3/27/20, C2D1 4/20, C3D1 on 5/4, C4D1 on 5/27, C5D1 on 6/11 with pump disconnect on 6/12. C2 was delayed due to pneumonia and C4 due to neutropenia.  - Restaging PET/CT 6/3/20 demonstrated almost complete resolution of soft tissue nodule next to the pancreaticoduodenal anastomosis without FDG uptake with mild residual FDG uptake at the site of gastrojejunal anastomosis which was deemed to be likely physiologic.   - She was last seen by Dr. Pollard in the clinic on 6/4. It was decided for her to complete 4 more cycles and a post-treatment PET/CT possibly followed by surgery.   - Patient most recently underwent C5D1 on 6/11.      #Nausea and vomiting  #Gastritis  #Acid reflux  # Likely delayed gastric emptying s/p Whipple   Pt had undergone EUS on 3/3/20 as part of the work-up of the gastric carcinoma with biopsy from the gastric jejunal anastomosis as well as lesser curvature demonstrating gastropathy with mild inflammation/erosion with no evidence of H. Pylori, intestinal metaplasia/dysplasia, or malignancy. Pt continues to have nausea off and on in relation to her acid reflux and dyspepsia. Palliative care team was consulted during last [facility] admission from 05/2020 and adjusted her medications with great and ongoing  symptom relief per the patient.   - Continue pantoprazole 40 mg BID.  - Continue famotidine 20 mg BID.  - Continue olanzapine 5 mg HS.  - Continue PRN simethicone, lorazepam, Tums and tramadol.   - Patient was last seen in the palliative clinic on 6/10 by Dr. Scott.      #Hx of aspiration pneumonia/pneumonitis  Patient was admitted to [facility] from 5/21-5/23/20 for aspiration pneumonia/pneumonitis for which she was treated with a 5 day-course of clindamycin.   - No current respiratory signs/symptoms.     #Chemotherapy-related neuropathy  - Continue amitriptyline 75 mg daily.      #Depression  - Continue citalopram 20 mg daily.      #Migraine  - Continue PRN sumatriptan.     FEN:   - Regular diet as tolerated  - LR at 100 mL/hr  - PRN lyte replacement per standing protocol       Diet: Regular Diet Adult    DVT Prophylaxis: Enoxaparin (Lovenox) SQ  Hernandez Catheter: not present  Code Status: Full Code           Disposition Plan   Expected discharge: Tomorrow, recommended to prior living arrangement once adequate pain management/ tolerating PO medications.  Entered: Salvatore George MD 06/16/2020, 2:22 PM       The patient's care was discussed with the Care Coordinator/, Patient and Oncology.    Salvatore George MD  Hospitalist Service, [address]  Pager: [phone]  Please see sticky note for cross cover information  ______________________________________________________________________    Interval History   No new complaints. Magic mouthwash without relief of oral pain. No other pain.     Data reviewed today: I reviewed all medications, new labs and imaging results over the last 24 hours. I personally reviewed no images or EKG's today.    Physical Exam   Vital Signs: Temp: 98.4  F (36.9  C) Temp src: Oral BP: 102/53 Pulse: 88 Heart Rate: 99 Resp: 18 SpO2: 95 % O2 Device: None (Room air)    Weight: 178 lbs 9.6 oz  Constitutional: awake, alert and no apparent  distress  ENT: oropharynx erythematous without ulceration or plaque  Respiratory: No increased work of breathing, good air exchange, clear to auscultation bilaterally, no crackles or wheezing  Cardiovascular: Normal apical impulse, regular rate and rhythm, normal S1 and S2, no S3 or S4, and no murmur noted  GI: No scars, normal bowel sounds, soft, non-distended, non-tender, no masses palpated, no hepatosplenomegally    Data   Recent Labs   Lab 06/16/20  0603 06/15/20  1405 06/10/20  0955   WBC 16.3* 27.5* 9.8   HGB 8.1* 9.6* 9.8*   MCV 88 86 88   * 143* 186    143 142   POTASSIUM 3.4 3.5 3.0*   CHLORIDE 110* 109 109   CO2 26 26 26   BUN 10 12 10   CR 0.74 0.78 0.87   ANIONGAP 6 7 7   PETRA 8.0* 8.1* 8.6   GLC 86 92 130*   ALBUMIN 2.2* 2.6* 3.0*   PROTTOTAL 5.1* 5.9* 6.6*   BILITOTAL 0.5 0.5 0.3   ALKPHOS 166* 186* 172*   ALT 36 44 43   AST 37 46* 48*

## 2020-06-16 NOTE — PLAN OF CARE
Pt admitted to the floor around 1800. Had cefepime, vanco, zofran, neck CT, dilaudid, and fluid bolus in the ED. VSS. Tmax 99.4. Has c/o sore throat. Needs strep A swab done yet. Covid swab result is negative. Up independently. Has cold sensitivities from oxaliplatin so likes fluids warm or room temperature. Given prn ativan x 1 and ultram x 1. Pleasant, alert and oriented. LBM today. Has port a cath accessed and IVF's infusing. Skin is intact. Oriented to POC and room. Admission questions completed.

## 2020-06-17 ENCOUNTER — HOME INFUSION (PRE-WILLOW HOME INFUSION) (OUTPATIENT)
Dept: PHARMACY | Facility: CLINIC | Age: 55
End: 2020-06-17

## 2020-06-17 VITALS
HEART RATE: 88 BPM | WEIGHT: 178.6 LBS | DIASTOLIC BLOOD PRESSURE: 53 MMHG | OXYGEN SATURATION: 96 % | SYSTOLIC BLOOD PRESSURE: 107 MMHG | HEIGHT: 69 IN | TEMPERATURE: 97.9 F | BODY MASS INDEX: 26.45 KG/M2 | RESPIRATION RATE: 16 BRPM

## 2020-06-17 LAB
ALBUMIN SERPL-MCNC: 2.2 G/DL (ref 3.4–5)
ALP SERPL-CCNC: 136 U/L (ref 40–150)
ALT SERPL W P-5'-P-CCNC: 30 U/L (ref 0–50)
ANION GAP SERPL CALCULATED.3IONS-SCNC: 3 MMOL/L (ref 3–14)
ANISOCYTOSIS BLD QL SMEAR: ABNORMAL
AST SERPL W P-5'-P-CCNC: 26 U/L (ref 0–45)
BASOPHILS # BLD AUTO: 0 10E9/L (ref 0–0.2)
BASOPHILS NFR BLD AUTO: 0.9 %
BILIRUB SERPL-MCNC: 0.4 MG/DL (ref 0.2–1.3)
BUN SERPL-MCNC: 5 MG/DL (ref 7–30)
CALCIUM SERPL-MCNC: 8.2 MG/DL (ref 8.5–10.1)
CHLORIDE SERPL-SCNC: 108 MMOL/L (ref 94–109)
CO2 SERPL-SCNC: 32 MMOL/L (ref 20–32)
CREAT SERPL-MCNC: 0.73 MG/DL (ref 0.52–1.04)
DIFFERENTIAL METHOD BLD: ABNORMAL
EOSINOPHIL # BLD AUTO: 0 10E9/L (ref 0–0.7)
EOSINOPHIL NFR BLD AUTO: 0 %
ERYTHROCYTE [DISTWIDTH] IN BLOOD BY AUTOMATED COUNT: 18.2 % (ref 10–15)
GFR SERPL CREATININE-BSD FRML MDRD: >90 ML/MIN/{1.73_M2}
GLUCOSE SERPL-MCNC: 92 MG/DL (ref 70–99)
HCT VFR BLD AUTO: 26.4 % (ref 35–47)
HGB BLD-MCNC: 8 G/DL (ref 11.7–15.7)
LYMPHOCYTES # BLD AUTO: 1.4 10E9/L (ref 0.8–5.3)
LYMPHOCYTES NFR BLD AUTO: 38.6 %
MCH RBC QN AUTO: 26.2 PG (ref 26.5–33)
MCHC RBC AUTO-ENTMCNC: 30.3 G/DL (ref 31.5–36.5)
MCV RBC AUTO: 87 FL (ref 78–100)
METAMYELOCYTES # BLD: 0.1 10E9/L
METAMYELOCYTES NFR BLD MANUAL: 1.8 %
MONOCYTES # BLD AUTO: 0 10E9/L (ref 0–1.3)
MONOCYTES NFR BLD AUTO: 0.9 %
MYELOCYTES # BLD: 0 10E9/L
MYELOCYTES NFR BLD MANUAL: 0.9 %
NEUTROPHILS # BLD AUTO: 2.1 10E9/L (ref 1.6–8.3)
NEUTROPHILS NFR BLD AUTO: 56.9 %
NRBC # BLD AUTO: 0 10*3/UL
NRBC BLD AUTO-RTO: 1 /100
OVALOCYTES BLD QL SMEAR: SLIGHT
PLATELET # BLD AUTO: 83 10E9/L (ref 150–450)
PLATELET # BLD EST: ABNORMAL 10*3/UL
POIKILOCYTOSIS BLD QL SMEAR: SLIGHT
POTASSIUM SERPL-SCNC: 3 MMOL/L (ref 3.4–5.3)
PROT SERPL-MCNC: 4.9 G/DL (ref 6.8–8.8)
RBC # BLD AUTO: 3.05 10E12/L (ref 3.8–5.2)
SODIUM SERPL-SCNC: 142 MMOL/L (ref 133–144)
WBC # BLD AUTO: 3.7 10E9/L (ref 4–11)

## 2020-06-17 PROCEDURE — 25000132 ZZH RX MED GY IP 250 OP 250 PS 637: Mod: GY | Performed by: STUDENT IN AN ORGANIZED HEALTH CARE EDUCATION/TRAINING PROGRAM

## 2020-06-17 PROCEDURE — 25800030 ZZH RX IP 258 OP 636: Performed by: INTERNAL MEDICINE

## 2020-06-17 PROCEDURE — 85025 COMPLETE CBC W/AUTO DIFF WBC: CPT | Performed by: INTERNAL MEDICINE

## 2020-06-17 PROCEDURE — 80053 COMPREHEN METABOLIC PANEL: CPT | Performed by: INTERNAL MEDICINE

## 2020-06-17 PROCEDURE — 25000128 H RX IP 250 OP 636: Performed by: INTERNAL MEDICINE

## 2020-06-17 PROCEDURE — 25000132 ZZH RX MED GY IP 250 OP 250 PS 637: Mod: GY | Performed by: INTERNAL MEDICINE

## 2020-06-17 PROCEDURE — 36592 COLLECT BLOOD FROM PICC: CPT | Performed by: INTERNAL MEDICINE

## 2020-06-17 PROCEDURE — 99232 SBSQ HOSP IP/OBS MODERATE 35: CPT | Performed by: INTERNAL MEDICINE

## 2020-06-17 RX ORDER — DOXEPIN HYDROCHLORIDE 10 MG/ML
10 SOLUTION ORAL 2 TIMES DAILY
Qty: 15 ML | Refills: 1 | Status: SHIPPED | OUTPATIENT
Start: 2020-06-17 | End: 2020-07-20

## 2020-06-17 RX ORDER — DOXEPIN HYDROCHLORIDE 10 MG/ML
10 SOLUTION ORAL 2 TIMES DAILY
Qty: 15 ML | Refills: 1 | Status: SHIPPED | OUTPATIENT
Start: 2020-06-17 | End: 2020-06-17

## 2020-06-17 RX ORDER — DIPHENHYDRAMINE HYDROCHLORIDE AND LIDOCAINE HYDROCHLORIDE AND ALUMINUM HYDROXIDE AND MAGNESIUM HYDRO
10 KIT EVERY 6 HOURS PRN
Qty: 1 BOTTLE | Refills: 1 | Status: SHIPPED | OUTPATIENT
Start: 2020-06-17 | End: 2020-07-20

## 2020-06-17 RX ADMIN — FAMOTIDINE 20 MG: 20 TABLET, FILM COATED ORAL at 08:34

## 2020-06-17 RX ADMIN — PANTOPRAZOLE SODIUM 40 MG: 40 TABLET, DELAYED RELEASE ORAL at 08:34

## 2020-06-17 RX ADMIN — SODIUM CHLORIDE, POTASSIUM CHLORIDE, SODIUM LACTATE AND CALCIUM CHLORIDE: 600; 310; 30; 20 INJECTION, SOLUTION INTRAVENOUS at 04:17

## 2020-06-17 RX ADMIN — LORAZEPAM 0.5 MG: 0.5 TABLET ORAL at 06:33

## 2020-06-17 RX ADMIN — TRAMADOL HYDROCHLORIDE 50 MG: 50 TABLET, FILM COATED ORAL at 06:26

## 2020-06-17 RX ADMIN — POTASSIUM CHLORIDE 20 MEQ: 750 TABLET, EXTENDED RELEASE ORAL at 10:11

## 2020-06-17 RX ADMIN — TRAMADOL HYDROCHLORIDE 50 MG: 50 TABLET, FILM COATED ORAL at 12:25

## 2020-06-17 RX ADMIN — POTASSIUM CHLORIDE 40 MEQ: 750 TABLET, EXTENDED RELEASE ORAL at 08:39

## 2020-06-17 RX ADMIN — MULTIPLE VITAMINS W/ MINERALS TAB 1 TABLET: TAB at 08:34

## 2020-06-17 RX ADMIN — LORAZEPAM 0.5 MG: 0.5 TABLET ORAL at 12:25

## 2020-06-17 RX ADMIN — CITALOPRAM HYDROBROMIDE 20 MG: 20 TABLET ORAL at 08:34

## 2020-06-17 RX ADMIN — ENOXAPARIN SODIUM 40 MG: 40 INJECTION SUBCUTANEOUS at 08:39

## 2020-06-17 RX ADMIN — DOXEPIN HYDROCHLORIDE 10 MG: 10 SOLUTION ORAL at 08:39

## 2020-06-17 ASSESSMENT — ACTIVITIES OF DAILY LIVING (ADL)
ADLS_ACUITY_SCORE: 10

## 2020-06-17 ASSESSMENT — PAIN DESCRIPTION - DESCRIPTORS
DESCRIPTORS: SORE
DESCRIPTORS: SORE

## 2020-06-17 ASSESSMENT — MIFFLIN-ST. JEOR: SCORE: 1479.75

## 2020-06-17 NOTE — DISCHARGE SUMMARY
[facility], [place]  Hospitalist Discharge Summary      Date of Admission:  6/15/2020  Date of Discharge:  6/17/2020  2:58 PM  Discharging Provider: Salvatore George MD  Discharge Team: Hospitalist Service, Gold 11    Discharge Diagnoses   Mucositis  Chemotherapy induced pancytopenia     Follow-ups Needed After Discharge   Follow-up Appointments     Follow Up and recommended labs and tests      Follow up with primary care provider and with oncology as arranged.             Unresulted Labs Ordered in the Past 30 Days of this Admission     Date and Time Order Name Status Description    6/15/2020 1408 Blood Culture ONE site Preliminary       These results will be followed up by oncology.     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   C/w mucositis.      #Chemotherapy-related mucositis  #Sore throat   She has had decreased PO solid /liquid intake due to the mouth/throat symptoms. Physical exam is consistent with mucositis with no oral ulcers or oropharyngeal candidiasis.   - Etiology is most likely related to chemotherapy. CT neck done in the ED was negative for neck mass, abscess or cervical adenopathy.   -improved with doxepin mouthwash.      #Leucocytosis  #Mild thrombocytopenia  Leucocytosis is related to Neulasta injection which patient received 6/13/20. Since it is pegylated, it can expectantly be within the body for up to 7 days. No concern for an occult systemic infection.      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 It was a pT4aN0 lesion, diffuse within the gastric wall with positive proximal gastric mucosal and serosal margins, negative other margins, positive lymphovascular as well as perineural invasion and all 22 lymph nodes were negative. She was started on neoadjuvant chemotherapy with FLOT (5FU, Oxaliplatin, Docetaxel) C1D1 3/27/20, C2D1 4/20, C3D1 on 5/4, C4D1 on 5/27, C5D1 on 6/11 with pump disconnect on 6/12. C2 was delayed due to pneumonia and C4 due to neutropenia.  - Restaging PET/CT 6/3/20 demonstrated almost complete resolution of soft tissue nodule next to the pancreaticoduodenal anastomosis without FDG uptake with mild residual FDG uptake at the site of gastrojejunal anastomosis which was deemed to be likely physiologic.   - She was last seen by Dr. Pollard in the clinic on 6/4. It was decided for her to complete 4 more cycles and a post-treatment PET/CT possibly followed by surgery.   - Patient most recently underwent C5D1 on 6/11.      #Nausea and vomiting  #Gastritis  #Acid reflux  # Likely delayed gastric emptying s/p Whipple   Pt had undergone EUS on 3/3/20 as part of the work-up of the gastric carcinoma with biopsy from the gastric jejunal anastomosis as well as lesser curvature demonstrating gastropathy with mild inflammation/erosion with no evidence of H. Pylori, intestinal metaplasia/dysplasia, or malignancy. Pt continues to have nausea off and on in relation to her acid reflux and dyspepsia. Palliative care team was consulted during last [facility] admission from 05/2020 and adjusted her medications with great and ongoing symptom relief per the patient.   - Continue pantoprazole 40 mg BID.  - Continue famotidine 20 mg BID.  - Continue olanzapine 5 mg HS.  - Continue PRN  simethicone, lorazepam, Tums and tramadol.   - Patient was last seen in the palliative clinic on 6/10 by Dr. Scott.      #Hx of aspiration pneumonia/pneumonitis  Patient was admitted to [facility] from 5/21-5/23/20 for aspiration pneumonia/pneumonitis for which she was treated with a 5 day-course of clindamycin.   - No current respiratory signs/symptoms.     #Chemotherapy-related neuropathy  - Continue amitriptyline 75 mg daily.      #Depression  - Continue citalopram 20 mg daily.      #Migraine  - Continue PRN sumatriptan.     Consultations This Hospital Stay   PHARMACY TO DOSE VANCO  PHYSICAL THERAPY ADULT IP CONSULT  ONCOLOGY ADULT IP CONSULT    Code Status   Full Code    Time Spent on this Encounter   I, Salvatore George MD, personally saw the patient today and spent greater than 30 minutes discharging this patient.       Salvatore George MD  [facility], [place]  ______________________________________________________________________    Physical Exam   Vital Signs: Temp: 97.9  F (36.6  C) Temp src: Oral BP: 107/53 Pulse: 88 Heart Rate: 94 Resp: 16 SpO2: 96 % O2 Device: None (Room air)    Weight: 178 lbs 9.6 oz  Constitutional: awake, alert, cooperative, no apparent distress, and appears stated age  Respiratory: No increased work of breathing, good air exchange, clear to auscultation bilaterally, no crackles or wheezing  Cardiovascular: Normal apical impulse, regular rate and rhythm, normal S1 and S2, no S3 or S4, and no murmur noted  GI: No scars, normal bowel sounds, soft, non-distended, non-tender, no masses palpated, no hepatosplenomegally       Primary Care Physician   Marianne Gonzalez    Discharge Orders      Reason for your hospital stay    mucositis     Activity    Your activity upon discharge: activity as tolerated     Follow Up and recommended labs and tests    Follow up with primary care provider and with oncology as arranged.     Full Code     Diet    Follow this diet upon  discharge: Orders Placed This Encounter      Regular Diet Adult       Significant Results and Procedures   Results for orders placed or performed during the hospital encounter of 06/15/20   Soft tissue neck CT w contrast    Narrative    CT SOFT TISSUE NECK W CONTRAST 6/15/2020 4:35 PM    History:  neck pain in setting of undifferentiated sepsis    Comparison:  WB PET/CT from 6/3/2020     Technique: Following intravenous administration of nonionic iodinated  contrast medium, thin section helical CT images were obtained from the  skull base down to the level of the aortic arch.  Axial, coronal and  sagittal reformations were performed with 2-3 mm slice thickness  reconstruction. Images were reviewed in soft tissue, lung and bone  windows.    Contrast: iopamidol (ISOVUE-370) solution 100 mL    Findings:   Evaluation of the mucosal space demonstrates no evident abnormality in  the nasopharynx, oropharynx, hypopharynx or the glottis. The tongue  base appears normal. The major salivary glands appear unremarkable.  The thyroid gland appears normal.    There is no evident cervical lymphadenopathy. The fascial spaces in  the neck are intact bilaterally. The major vascular structures in the  neck appear unremarkable.    Evaluation of the osseous structures demonstrate no worrisome lytic or  sclerotic lesion. No overt spinal canal or neuroforaminal stenosis.     No significant dental disease. The visualized paranasal sinuses are  clear. The mastoid air cells are clear.     The visualized lung apices are clear.    Right-sided Port-A-Cath.      Impression    Impression:  Unremarkable CT study of the neck with contrast.  No  evident mass, abscess, or adenopathy within the neck.    I have personally reviewed the examination and initial interpretation  and I agree with the findings.    XAVIER ANDERSON MD       Discharge Medications   Discharge Medication List as of 6/17/2020  1:26 PM      START taking these medications    Details    benzocaine-menthol (CEPACOL) 15-3.6 MG lozenge Place 1 lozenge inside cheek every hour as needed for sore throat, Disp-15 lozenge,R-1, E-Prescribe      magic mouthwash suspension, diphenhydrAMINE, lidocaine, aluminum-magnesium & simethicone, (FIRST-MOUTHWASH BLM) compounding kit Swish and spit 10 mLs in mouth every 6 hours as needed for mouth sores, Disp-1 Bottle,R-1, E-Prescribe      doxepin (SINEQUAN) 10 MG/ML (HIGH CONC) solution Take 1 mL (10 mg) by mouth 2 times daily, Disp-15 mL,R-1, E-Prescribe         CONTINUE these medications which have NOT CHANGED    Details   amitriptyline (ELAVIL) 25 MG tablet Take 3 tablets by mouth At Bedtime, Historical      calcium carb 1250 mg, 500 mg [place],/vitamin D 200 units (OSCAL WITH D) 500-200 MG-UNIT per tablet Take 1 tablet by mouth 3 times daily (with meals), Historical      citalopram (CELEXA) 20 MG tablet Take 20 mg by mouth every morning , Historical      dexamethasone (DECADRON) 4 MG tablet Take 1 tablet (4mg) the day before, day of, and 2 days after chemotherapy, Disp-12 tablet,R-3, E-Prescribe      diclofenac (FLECTOR) 1.3 % patch Place 1 patch onto the skin daily as needed , Historical      famotidine (PEPCID) 20 MG tablet Take 1 tablet (20 mg) by mouth 2 times daily, Disp-60 tablet,R-3, E-Prescribe      fluticasone (FLONASE) 50 MCG/ACT nasal spray Spray 2 sprays into both nostrils daily as needed , Historical      hydrOXYzine (ATARAX) 10 MG tablet Take 10 mg by mouth 3 times daily as needed for itching, Historical      LORazepam (ATIVAN) 0.5 MG tablet Take 1 tablet (0.5 mg) by mouth every 4 hours as needed (Anxiety, Nausea/Vomiting or Sleep), Disp-30 tablet,R-5, Local Print      meclizine (ANTIVERT) 25 MG tablet Take 1 tablet (25 mg) by mouth 3 times daily as needed for dizziness, Disp-30 tablet,R-0, Historical      multivitamin, therapeutic with minerals (MULTI-VITAMIN) TABS Take 1 tablet by mouth daily, Historical      OLANZapine zydis (ZYPREXA) 5 MG ODT Take 1  tablet (5 mg) by mouth At Bedtime, Disp-30 tablet,R-0, E-Prescribe      ondansetron (ZOFRAN) 8 MG tablet Take 1 tablet (8 mg) by mouth every 8 hours as needed (Nausea/Vomiting), Disp-90 tablet,R-3, E-Prescribe      ondansetron (ZOFRAN-ODT) 4 MG ODT tab Take 1 tablet (4 mg) by mouth every 6 hours as needed for nausea or vomiting, Disp-90 tablet,R-3, E-Prescribe      pantoprazole (PROTONIX) 40 MG EC tablet Take 1 tablet (40 mg) by mouth 2 times daily, Disp-60 tablet,R-1, E-Prescribe      senna-docusate (SENOKOT-S/PERICOLACE) 8.6-50 MG tablet Take 1 tablet by mouth daily as needed for constipation, Disp-30 tablet,R-0, E-Prescribe      simethicone 80 MG TABS Take 1 tablet by mouth every 6 hours as needed (bloating, gas, belching), Disp-90 tablet,R-3, E-Prescribe      SUMAtriptan (IMITREX) 100 MG tablet Take 100 mg by mouth as needed, Historical      tiZANidine (ZANAFLEX) 4 MG tablet Take 1 tablet by mouth 2 times daily as needed, Historical      traMADol (ULTRAM) 50 MG tablet Take 1 tablet (50 mg) by mouth every 6 hours as needed for severe pain, Disp-60 tablet,R-0, E-Prescribe           Allergies   Allergies   Allergen Reactions     Gluten Meal Palpitations     Augmentin Rash     Oxycontin [Oxycodone]      Confusion, loopy, oxycodone is tolerated     Pregabalin      interfered with Cymbalta     Topiramate      Tongue numbness, taste alteration, irritable      Acetaminophen Nausea     Urine retention       [place] Extract      Gabapentin Dizziness and GI Disturbance     Latex Rash     Methadone Fatigue     Mold      Naprosyn [Naproxen] Dizziness and GI Disturbance     Oxymetholone      Seasonal Allergies

## 2020-06-17 NOTE — PLAN OF CARE
"[id number]    BP 98/61 (BP Location: Right arm)   Pulse 93   Temp 98.4  F (36.9  C) (Oral)   Resp 20   Ht 1.753 m (5' 9.02\")   Wt 81 kg (178 lb 9.6 oz)   LMP 09/23/2014   SpO2 94%   BMI 26.36 kg/m      Alert and oriented x 4.  VSS on RA.  Pain controled with tramadol x 1.  Anxiety controlled with ativan x 1.  Pt with no BM and no nausea this shift.  Voided x 1.  Continues on LR @ 100/hr.      POC: Possible discharge today.    "

## 2020-06-17 NOTE — PLAN OF CARE
AVSS. Tramadol given for oral pain. Ativan given x 2 to help pt relax. Lidocaine rinse given x 1 around dinner. Was able to eat grilled cheese, jello, and chips for dinner. Found very helpful. Got first dose of doxepin this evening. Reports diarrhea x 2 today with some incontinence. Imodium ordered but instructed to wait and have staff assess if she has an additional stool before taking. Hopeful to discharge tomorrow. Continue with POC.

## 2020-06-17 NOTE — PROGRESS NOTES
Hematology / Oncology  Daily Progress Note   Date of Service: 06/17/2020  Patient: Nathalie Bashir  MRN: [id number]  Admission Date: 6/15/2020  Hospital Day # 2  Cancer Diagnosis: Locally-advanced gastric adenocarcinoma  Primary Outpatient Oncologist: Dr Pollard  Current Treatment Plan: Neoadjuvant intent FLOT chemo.    Recommendations:   - Patient is medically ready for discharge per onc's perspective  - Please continue Doxepin at home for relief of grade 2 mucositis   - If swish and swallow can take this med 3 times a day per pharmacy   - If swish and spit can take this med up to 6 times a day per pharmacy  - Has follow up establish on 6/24 with oncolgy    Assessment & Plan:   Nathalie Bashir is a 54 year old with past medical history significant for signet ring gastric adenocarcinoma currently on active treatment with FLOT, admitted to hospital with sore throat, dysphagia, odynophagia.     # Mucositis, Grade 2, chemotherapy associated  - Patient presented with severe acute oropharyngeal pain  - She did not tolerate MMW or oral lidocaine solution  - She did tolerate oral solution doxepin and Biotene   - Recommend discharge with doxepin   - Per pharmacy recs:    - If swish and swallow can take this med 3 times a day per pharmacy    - If swish and spit can take this med up to 6 times a day per pharmacy  - Mucositis has greatly improved without evidence of thrush or oral sores  - Should continue to improve with improvement of white count as well  - It not neutropenic    # Gastric Cancer  Follows with Dr Pollard  - Currently being treated with Neoadjuvant intent FLOT chemo.  - Has follow up scheduled with oncology on 6/24 prior to cycle 6 of FLOT therapy      Oncologic History:  Please see original consult note    Patient was seen and plan of care was discussed with attending physician Dr. Desouza     Thank you for the opportunity to partake in this patients plan of care. Please do not hesitate to page with questions.  "We will continue to follow with you    Rosa Blank PA-C   Hematology/Oncology   Pager: [phone]   ___________________________________________________________________    Subjective & Interval History:    No acute events noted overnight.  Nathalie is doing well today. States she is ready to go home and is looking forward to continuing therapy. States he mucositis has greatly improved and only has a \"little bit\" of a sore throat. States the Doxepin has helped. Encouraged her to continue it until her symptoms resolve at home.   Discussed that she soul recover quickly with return of her white count. Has somewhat of an appetite. Regular bowel movements. Denies fever, chills, SOB, cough, abdominal pain, nausea, vomiting, dysuria.    Physical Exam:    Blood pressure 107/53, pulse 88, temperature 97.9  F (36.6  C), temperature source Oral, resp. rate 16, height 1.753 m (5' 9.02\"), weight 81 kg (178 lb 9.6 oz), last menstrual period 09/23/2014, SpO2 96 %, not currently breastfeeding.    Constitutional: Pleasant female sitting up in bed. Awake and conversational. Non- toxic appearing. No acute distress.  HEENT: Normocephalic, atraumatic. Sclerae anicteric. EOM intact. Moist mucus membranes without lesions, thrush, or exudates appreciated. Some erythema noted on her posterior pharynx and tongue  Respiratory: Breathing comfortable with no increased work on room air. No signs of respiratory distress or accessory muscle use. Lungs clear to auscultation bilaterally, no crackles or wheezing noted.  Cardiovascular: Regular rate and rhythm. Normal S1 and S2. No murmurs, rubs, or gallops. No peripheral edema.    GI: Abdomen is soft, non-distended, non-tender and without distension. Bowel sounds present and normoactive.   Skin: Skin is clean, dry, intact. No jaundice appreciated.   Musculoskeletal/ Extremities: No redness, warmth, or swelling of the joints appreciated. Distal pulses palpable.   Neurologic: Alert and oriented. Speech " normal. Grossly nonfocal. Memory and thought process preserved.   Neuropsychiatric: Calm, affect congruent to situation. Appropriate mood and affect. Good judgment and insight.     Labs & Studies: I personally reviewed the following studies:  ROUTINE LABS (Last four results):  CMP  Recent Labs   Lab 06/17/20  0701 06/16/20  0603 06/15/20  1405    142 143   POTASSIUM 3.0* 3.4 3.5   CHLORIDE 108 110* 109   CO2 32 26 26   ANIONGAP 3 6 7   GLC 92 86 92   BUN 5* 10 12   CR 0.73 0.74 0.78   GFRESTIMATED >90 >90 86   GFRESTBLACK >90 >90 >90   PETRA 8.2* 8.0* 8.1*   MAG  --  1.9  --    PHOS  --  3.7  --    PROTTOTAL 4.9* 5.1* 5.9*   ALBUMIN 2.2* 2.2* 2.6*   BILITOTAL 0.4 0.5 0.5   ALKPHOS 136 166* 186*   AST 26 37 46*   ALT 30 36 44     CBC  Recent Labs   Lab 06/17/20  0701 06/16/20  0603 06/15/20  1405   WBC 3.7* 16.3* 27.5*   RBC 3.05* 3.08* 3.68*   HGB 8.0* 8.1* 9.6*   HCT 26.4* 27.0* 31.7*   MCV 87 88 86   MCH 26.2* 26.3* 26.1*   MCHC 30.3* 30.0* 30.3*   RDW 18.2* 18.7* 18.7*   PLT 83* 102* 143*     INRNo lab results found in last 7 days.    Medications list for reference:  Current Facility-Administered Medications   Medication     acetaminophen (TYLENOL) tablet 650 mg     amitriptyline (ELAVIL) tablet 75 mg     artificial saliva (BIOTENE MT) solution 1 spray     benzocaine-menthol (CEPACOL) 15-3.6 MG lozenge 1 lozenge     citalopram (celeXA) tablet 20 mg     doxepin (SINEquan) 10 MG/ML (HIGH CONC) solution 10 mg     enoxaparin ANTICOAGULANT (LOVENOX) injection 40 mg     famotidine (PEPCID) tablet 20 mg     fluticasone (FLONASE) 50 MCG/ACT spray 2 spray     hydrOXYzine (ATARAX) tablet 10 mg     lactated ringers infusion     lidocaine (XYLOCAINE) 2 % solution 5 mL     loperamide (IMODIUM) capsule 2 mg     LORazepam (ATIVAN) tablet 0.5 mg     magic mouthwash suspension (diphenhydramine, lidocaine, aluminum-magnesium & simethicone)     magnesium sulfate 4 g in 100 mL sterile water (premade)     Medication Instruction      multivitamin w/minerals (THERA-VIT-M) tablet 1 tablet     naloxone (NARCAN) injection 0.1-0.4 mg     OLANZapine zydis (zyPREXA) ODT tab 5 mg     ondansetron (ZOFRAN) injection 8 mg    Or     ondansetron (ZOFRAN-ODT) ODT tab 8 mg    Or     ondansetron (ZOFRAN) tablet 8 mg     pantoprazole (PROTONIX) EC tablet 40 mg     potassium chloride (KLOR-CON) Packet 20-40 mEq     potassium chloride 10 mEq in 100 mL intermittent infusion with 10 mg lidocaine     potassium chloride 10 mEq in 100 mL sterile water intermittent infusion (premix)     potassium chloride 20 mEq in 50 mL intermittent infusion     potassium chloride ER (KLOR-CON M) CR tablet 20-40 mEq     potassium phosphate 15 mmol in D5W 250 mL intermittent infusion     potassium phosphate 20 mmol in D5W 250 mL intermittent infusion     potassium phosphate 20 mmol in D5W 500 mL intermittent infusion     potassium phosphate 25 mmol in D5W 500 mL intermittent infusion     prochlorperazine (COMPAZINE) tablet 5 mg    Or     prochlorperazine (COMPAZINE) injection 5 mg     senna-docusate (SENOKOT-S/PERICOLACE) 8.6-50 MG per tablet 1 tablet     simethicone (MYLICON) chewable tablet 80 mg     SUMAtriptan (IMITREX) tablet 100 mg     traMADol (ULTRAM) tablet 50 mg     Facility-Administered Medications Ordered in Other Encounters   Medication     heparin 100 UNIT/ML injection 5 mL     sodium chloride (PF) 0.9% PF flush 10 mL

## 2020-06-17 NOTE — PROGRESS NOTES
Discharge  D: Orders for discharge and outpatient medications written.  I: Home medications and return to clinic schedule reviewed with patient. Discharge instructions and parameters for calling Health Care Provider reviewed. Patient left at 14:45 accompanied by .   A: Patient/family verbalized understanding and was ready for discharge.   P: Patient instructed to  medications in Pharmacy. Follow up as scheduled June 24 in clinic.

## 2020-06-17 NOTE — PROGRESS NOTES
Vitals stable. Afebrile. Pain managed with PRN tramadol x1. Anxiety managed with PRN ativan x1.Oncology consulted. K replaced for level 3.0. Pt to discharge home at 15:00. Port de-accessed, heparin flushed. Pt denies questions/concenrs.  picking pt up at entrance.

## 2020-06-18 ENCOUNTER — TELEPHONE (OUTPATIENT)
Dept: ONCOLOGY | Facility: CLINIC | Age: 55
End: 2020-06-18

## 2020-06-18 NOTE — TELEPHONE ENCOUNTER
Pt calling to report a couple of mouth sores that have developed today. No signs of thrush. Ok to use salt/soda rinses after each meal and at HS. Also has MMW which she can use prior to meals and at HS.    Pt understands and agrees with plan. Will call back to triage if not getting relief

## 2020-06-19 NOTE — PROGRESS NOTES
This is a recent snapshot of the patient's [facility] medical record.  For current drug dose and complete information and questions, call [phone] or In [facility] pool, [facility]v home infusion ([id number])  CSN Number:  [id number]

## 2020-06-21 LAB
BACTERIA SPEC CULT: NO GROWTH
SPECIMEN SOURCE: NORMAL

## 2020-06-24 ENCOUNTER — VIRTUAL VISIT (OUTPATIENT)
Dept: ONCOLOGY | Facility: CLINIC | Age: 55
End: 2020-06-24
Attending: PHYSICIAN ASSISTANT
Payer: MEDICARE

## 2020-06-24 VITALS
BODY MASS INDEX: 25.7 KG/M2 | TEMPERATURE: 98.3 F | WEIGHT: 174.1 LBS | HEART RATE: 110 BPM | RESPIRATION RATE: 16 BRPM | SYSTOLIC BLOOD PRESSURE: 93 MMHG | DIASTOLIC BLOOD PRESSURE: 60 MMHG | OXYGEN SATURATION: 100 %

## 2020-06-24 VITALS — BODY MASS INDEX: 25.77 KG/M2 | HEIGHT: 69 IN | WEIGHT: 174 LBS

## 2020-06-24 DIAGNOSIS — C16.9 GASTRIC ADENOCARCINOMA (H): Primary | ICD-10-CM

## 2020-06-24 LAB
ALBUMIN SERPL-MCNC: 2.7 G/DL (ref 3.4–5)
ALP SERPL-CCNC: 194 U/L (ref 40–150)
ALT SERPL W P-5'-P-CCNC: 37 U/L (ref 0–50)
ANION GAP SERPL CALCULATED.3IONS-SCNC: 9 MMOL/L (ref 3–14)
AST SERPL W P-5'-P-CCNC: 38 U/L (ref 0–45)
BASOPHILS # BLD AUTO: 0.1 10E9/L (ref 0–0.2)
BASOPHILS NFR BLD AUTO: 0.7 %
BILIRUB SERPL-MCNC: 0.4 MG/DL (ref 0.2–1.3)
BUN SERPL-MCNC: 13 MG/DL (ref 7–30)
CALCIUM SERPL-MCNC: 8.3 MG/DL (ref 8.5–10.1)
CHLORIDE SERPL-SCNC: 107 MMOL/L (ref 94–109)
CO2 SERPL-SCNC: 23 MMOL/L (ref 20–32)
CREAT SERPL-MCNC: 0.9 MG/DL (ref 0.52–1.04)
DIFFERENTIAL METHOD BLD: ABNORMAL
EOSINOPHIL # BLD AUTO: 0 10E9/L (ref 0–0.7)
EOSINOPHIL NFR BLD AUTO: 0.1 %
ERYTHROCYTE [DISTWIDTH] IN BLOOD BY AUTOMATED COUNT: 18.6 % (ref 10–15)
GFR SERPL CREATININE-BSD FRML MDRD: 72 ML/MIN/{1.73_M2}
GLUCOSE SERPL-MCNC: 104 MG/DL (ref 70–99)
HCT VFR BLD AUTO: 30.9 % (ref 35–47)
HGB BLD-MCNC: 9.4 G/DL (ref 11.7–15.7)
IMM GRANULOCYTES # BLD: 0.2 10E9/L (ref 0–0.4)
IMM GRANULOCYTES NFR BLD: 2.3 %
LYMPHOCYTES # BLD AUTO: 2.6 10E9/L (ref 0.8–5.3)
LYMPHOCYTES NFR BLD AUTO: 24.2 %
MCH RBC QN AUTO: 26.6 PG (ref 26.5–33)
MCHC RBC AUTO-ENTMCNC: 30.4 G/DL (ref 31.5–36.5)
MCV RBC AUTO: 87 FL (ref 78–100)
MONOCYTES # BLD AUTO: 1.2 10E9/L (ref 0–1.3)
MONOCYTES NFR BLD AUTO: 11.1 %
NEUTROPHILS # BLD AUTO: 6.6 10E9/L (ref 1.6–8.3)
NEUTROPHILS NFR BLD AUTO: 61.6 %
NRBC # BLD AUTO: 0 10*3/UL
NRBC BLD AUTO-RTO: 0 /100
PLATELET # BLD AUTO: 185 10E9/L (ref 150–450)
POTASSIUM SERPL-SCNC: 3 MMOL/L (ref 3.4–5.3)
PROT SERPL-MCNC: 6.3 G/DL (ref 6.8–8.8)
RBC # BLD AUTO: 3.54 10E12/L (ref 3.8–5.2)
SODIUM SERPL-SCNC: 139 MMOL/L (ref 133–144)
WBC # BLD AUTO: 10.6 10E9/L (ref 4–11)

## 2020-06-24 PROCEDURE — 80053 COMPREHEN METABOLIC PANEL: CPT | Performed by: PHYSICIAN ASSISTANT

## 2020-06-24 PROCEDURE — 85025 COMPLETE CBC W/AUTO DIFF WBC: CPT | Performed by: PHYSICIAN ASSISTANT

## 2020-06-24 PROCEDURE — 99214 OFFICE O/P EST MOD 30 MIN: CPT | Mod: 95 | Performed by: PHYSICIAN ASSISTANT

## 2020-06-24 PROCEDURE — 36591 DRAW BLOOD OFF VENOUS DEVICE: CPT

## 2020-06-24 PROCEDURE — 25000128 H RX IP 250 OP 636: Performed by: PHYSICIAN ASSISTANT

## 2020-06-24 RX ORDER — HEPARIN SODIUM (PORCINE) LOCK FLUSH IV SOLN 100 UNIT/ML 100 UNIT/ML
5 SOLUTION INTRAVENOUS
Status: CANCELLED | OUTPATIENT
Start: 2020-06-26

## 2020-06-24 RX ORDER — HEPARIN SODIUM (PORCINE) LOCK FLUSH IV SOLN 100 UNIT/ML 100 UNIT/ML
5 SOLUTION INTRAVENOUS ONCE
Status: COMPLETED | OUTPATIENT
Start: 2020-06-24 | End: 2020-06-24

## 2020-06-24 RX ORDER — DIPHENHYDRAMINE HYDROCHLORIDE 50 MG/ML
50 INJECTION INTRAMUSCULAR; INTRAVENOUS
Status: CANCELLED
Start: 2020-06-25

## 2020-06-24 RX ORDER — SODIUM CHLORIDE 9 MG/ML
1000 INJECTION, SOLUTION INTRAVENOUS CONTINUOUS PRN
Status: CANCELLED
Start: 2020-06-25

## 2020-06-24 RX ORDER — LORAZEPAM 2 MG/ML
0.5 INJECTION INTRAMUSCULAR EVERY 4 HOURS PRN
Status: CANCELLED
Start: 2020-06-25

## 2020-06-24 RX ORDER — NALOXONE HYDROCHLORIDE 0.4 MG/ML
.1-.4 INJECTION, SOLUTION INTRAMUSCULAR; INTRAVENOUS; SUBCUTANEOUS
Status: CANCELLED | OUTPATIENT
Start: 2020-06-25

## 2020-06-24 RX ORDER — MEPERIDINE HYDROCHLORIDE 25 MG/ML
25 INJECTION INTRAMUSCULAR; INTRAVENOUS; SUBCUTANEOUS EVERY 30 MIN PRN
Status: CANCELLED | OUTPATIENT
Start: 2020-06-25

## 2020-06-24 RX ORDER — EPINEPHRINE 0.3 MG/.3ML
0.3 INJECTION SUBCUTANEOUS EVERY 5 MIN PRN
Status: CANCELLED | OUTPATIENT
Start: 2020-06-25

## 2020-06-24 RX ORDER — METHYLPREDNISOLONE SODIUM SUCCINATE 125 MG/2ML
125 INJECTION, POWDER, LYOPHILIZED, FOR SOLUTION INTRAMUSCULAR; INTRAVENOUS
Status: CANCELLED
Start: 2020-06-25

## 2020-06-24 RX ORDER — ALBUTEROL SULFATE 0.83 MG/ML
2.5 SOLUTION RESPIRATORY (INHALATION)
Status: CANCELLED | OUTPATIENT
Start: 2020-06-25

## 2020-06-24 RX ORDER — ALBUTEROL SULFATE 90 UG/1
1-2 AEROSOL, METERED RESPIRATORY (INHALATION)
Status: CANCELLED
Start: 2020-06-25

## 2020-06-24 RX ORDER — EPINEPHRINE 1 MG/ML
0.3 INJECTION, SOLUTION INTRAMUSCULAR; SUBCUTANEOUS EVERY 5 MIN PRN
Status: CANCELLED | OUTPATIENT
Start: 2020-06-25

## 2020-06-24 RX ADMIN — HEPARIN SODIUM (PORCINE) LOCK FLUSH IV SOLN 100 UNIT/ML 5 ML: 100 SOLUTION at 10:30

## 2020-06-24 ASSESSMENT — PAIN SCALES - GENERAL
PAINLEVEL: MODERATE PAIN (4)
PAINLEVEL: MILD PAIN (3)

## 2020-06-24 ASSESSMENT — MIFFLIN-ST. JEOR: SCORE: 1458.64

## 2020-06-24 NOTE — PROGRESS NOTES
"Nathalie Bashir is a 54 year old female who is being evaluated via a billable video visit.      The patient has been notified of following:     \"This video visit will be conducted via a call between you and your physician/provider. We have found that certain health care needs can be provided without the need for an in-person physical exam.  This service lets us provide the care you need with a video conversation.  If a prescription is necessary we can send it directly to your pharmacy.  If lab work is needed we can place an order for that and you can then stop by our lab to have the test done at a later time.    Video visits are billed at different rates depending on your insurance coverage.  Please reach out to your insurance provider with any questions.    If during the course of the call the physician/provider feels a video visit is not appropriate, you will not be charged for this service.\"    Patient has given verbal consent for Video visit? Yes    Will anyone else be joining your video visit? No       I have reviewed and updated the patient's allergies and medication list.    Concerns: No concerns  Refills: No refills      Love Rae CMA      "

## 2020-06-24 NOTE — NURSING NOTE
"Chief Complaint   Patient presents with     Port Draw     port accessed and labs drawn by rn in lab.  vital signs taken.     Port accessed by RN in lab with 20g 3/4\" power needle, labs drawn, port flushed with saline and heparin, vitals checked.    Joann Andrews RN      "

## 2020-06-24 NOTE — PROGRESS NOTES
Oncology/Hematology Visit Note  Jun 24, 2020    Oncologic History  Ms. Bashir is a 54 year old female with a history of gastric adenocarcinoma.  She was being followed for a pancreatic duct dilatation and pancreatic cyst which was noted in November 2018.  Since May 2019 she started noticing some nausea and vomiting and occasional abdominal discomfort.  She had an ultrasound in August 2019 which was essentially unremarkable.  She had a repeat endoscopic ultrasound on 10/29/2019 which showed increase in size of the cyst as well as dilation of the main pancreatic duct.  FNA and fluid analysis showed mucinous epithelium.  She was referred to Dr. 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  Surprisingly there was no pancreatic ductal neoplasm seen but on the resected specimen stomach cancer was noted.  It was poorly differentiated adenocarcinoma with poorly cohesive/signet ring cell type with proximal gastric mucosal and serosal margins being positive.  There was lymphovascular and perineural invasion seen.  22 lymph nodes were sampled and all were benign.  It was a pT4aN0 lesion.  HER-2/christine FISH was not amplified.         EUS on 3/3/2020 without clear evidence of neoplasm endoscopically. Left gastric lymph node fine-needle aspiration was negative for carcinoma. Biopsy from the gastric jejunal anastomosis as well as lesser curvature of the stomach also did not show any malignancy.     She had a PET/CT on 3/13/2020 which showed soft tissue streaky density with increased FDG uptake adjacent to the pancreaticojejunostomy which could be neoplastic in origin.  There is mild increased FDG uptake in the gastric remnant.  Focal area of soft tissue thickening with fatty streakiness along medial laparotomy with increased FDG uptake which likely is inflammatory. Increased FDG uptake in thoracic esophagus which could be esophagitis.     She started on chemotherapy with 5FU, oxaliplatin, and  Taxotere on 3/27/20. She was seen on 4/7/20 for evaluation of fevers. She was treated for possible pneumonia with Levaquin.     She was seen in clinic 4/13/20 for RUQ pain,CT and labs reassuring. She received cycles 2 and 3 of chemotherapy on 4/20/20 and 5/4/20. She was admitted from 5/21-5/23/20 with neutropenic aspiration pneumonia. She received cycle 4 on 5/27/20 with Neulasta support. PET/CT on 6/3/20 showed mild residual uptake at the site of gastrojejunal anastomosis which likely is physiologic.  There is almost resolution of soft tissue nodule next to the pancreaticoduodenal anastomosis without FDG uptake.  There is focal increased uptake in the left posterior acetabulum which could be artifact.    Decreased dose of oxaliplatin due to neuropathy/cold sensitivity on 6/11.     Interval History:  -Continues to have diarrhea, 2x/day. Taking Imodium, about 4 pills a day   -Still eating though has no taste. Tongue feels numb. No mouth pain  -Has s/s swishes after eating.   -No thrush  -Little nausea once in a while. Better than last treatment. Using antiemetics   -Still having GERD. Tums prn   -Cold sensitivity still in mouth and hands. This is bothersome to her  -Feet are numb  -rashes on side of hands. Not itchy or tender. Red and little bumps  -Has been drinking [place] Instant Breakfast with 2% milk sometimes. Does not like Boost        Objective:  Video physical exam  General: Patient appears well in no acute distress. Normal body habitus.  Skin: No visualized rash or lesions on visualized skin  Eyes: EOMI, no erythema, sclera icterus or discharge noted  Resp: Appears to be breathing comfortably without accessory muscle usage, speaking in full sentences, no cough  MSK: Appears to have normal range of motion based on visualized movements  Neurologic: No apparent tremors, facial movements symmetric  Psych: affect normal, alert and oriented    The rest of a comprehensive physical examination is deferred due to  PHE (public health emergency) video restrictions      Labs:   6/24/2020 10:36   Sodium 139   Potassium 3.0 (L)   Chloride 107   Carbon Dioxide 23   Urea Nitrogen 13   Creatinine 0.90   GFR Estimate 72   GFR Estimate If Black 84   Calcium 8.3 (L)   Anion Gap 9   Albumin 2.7 (L)   Protein Total 6.3 (L)   Bilirubin Total 0.4   Alkaline Phosphatase 194 (H)   ALT 37   AST 38   Glucose 104 (H)   WBC 10.6   Hemoglobin 9.4 (L)   Hematocrit 30.9 (L)   Platelet Count 185   RBC Count 3.54 (L)   MCV 87   MCH 26.6   MCHC 30.4 (L)   RDW 18.6 (H)   Diff Method Automated Method   % Neutrophils 61.6   % Lymphocytes 24.2   % Monocytes 11.1   % Eosinophils 0.1   % Basophils 0.7   % Immature Granulocytes 2.3   Nucleated RBCs 0   Absolute Neutrophil 6.6   Absolute Lymphocytes 2.6   Absolute Monocytes 1.2   Absolute Eosinophils 0.0   Absolute Basophils 0.1   Abs Immature Granulocytes 0.2   Absolute Nucleated RBC 0.0       Assessment and Plan:  1. 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 This was found on Whipple surgery specimen as it was done for presumed pancreatic IPMN although no pancreatic neoplasm was found.  It was a pT4aN0 lesion with lymphovascular invasion and perineural invasion seen. All 22 lymph nodes were negative.    She was started on neoadjuvant chemotherapy with FLOT (5FU, Oxaliplatin, Docetaxel) 3/27/20 with complications as noted below. Decreased oxalplatin by 20% with Cycle 5 due to persistent cold sensitivity and neuropathy. She developed mucositis this past cycle and has diarrhea and taste changes that has been influencing intake. Will decrease 5FU to 85% of dose. She will continue with cycle 6 FLOT tomorrow. Will repeat a PET scan after 8 cycles and plan for surgery.    2. GI  Gastritis: Ongoing. Continue Pantoprazole 40mg BID and Famotidine 20mg BID. Continue Tums and Simethicone prn     RUQ/epigastric pain: CT imaging with no acute findings. Suspect  post surgical plus gastritis. Ok to continue Tramadol PRN. Following with palliative care.     Elevated LFT's. Mild. Likely due to oxaliplatin. Will monitor for now.     Mucositis: Admitted on 6/15 due to decreased intake. Now using s/s swishes. Symptoms improved. Can use MMW and doxepin mouthwash prn     Nausea: Continue Zofran and Zyprexa at bedtime     Diarrhea: Having 2x/day. Using Imodium about 4 pills a day. Reviewed that she can use up to 8 pills/day. Can add Lomotil if needed. Hopefully dose reducing 5FU will help as well    3. Neuro  Baseline neuropathy in her feet for > 10 years. Initially improved with chemotherapy for unclear reasons, not back to her baseline. As above, decreased oxaliplatin by 20% due to worsening cold sensitivity.    4. FEN  Hypokalemia: Worse again. Will replace per protocol and increase potassium foods. If it is low on next check, will need daily replacement. Likely due to diarrhea and decreased intake     Weight loss, Dysgeusia: Needs to continue to push PO intake. Continue [place] Breakfast with whole milk. Suggested Premier Protein as well     Video-Visit Details    Type of service:  Video Visit    Video Start Time: 4:55 PM  Video End Time: 5:12 PM    Originating Location (pt. Location): Home    Distant Location (provider location):  Provider's Home    Platform used for Video Visit: Miguel A Eden PA-C  [facility]  [address] [place] [address]  [phone]    Transition Care Management Services  No data recorded  No data recorded  No data recorded  Interactive contact date: 5/24/2020  Face-to-face visit date: 6/24/2020    Medical complexity decision making: Moderate complexity (38573)

## 2020-06-24 NOTE — LETTER
"    6/24/2020         RE: Nathalie Bashir  [address] [address]        Dear Colleague,    Thank you for referring your patient, Nathalie Bashir, to the [facility]. Please see a copy of my visit note below.    Nathalie Bashir is a 54 year old female who is being evaluated via a billable video visit.      The patient has been notified of following:     \"This video visit will be conducted via a call between you and your physician/provider. We have found that certain health care needs can be provided without the need for an in-person physical exam.  This service lets us provide the care you need with a video conversation.  If a prescription is necessary we can send it directly to your pharmacy.  If lab work is needed we can place an order for that and you can then stop by our lab to have the test done at a later time.    Video visits are billed at different rates depending on your insurance coverage.  Please reach out to your insurance provider with any questions.    If during the course of the call the physician/provider feels a video visit is not appropriate, you will not be charged for this service.\"    Patient has given verbal consent for Video visit? Yes    Will anyone else be joining your video visit? No       I have reviewed and updated the patient's allergies and medication list.    Concerns: No concerns  Refills: No refills      [facility]        Oncology/Hematology Visit Note  Jun 24, 2020    Oncologic History  Ms. Bashir is a 54 year old female with a history of gastric adenocarcinoma.  She was being followed for a pancreatic duct dilatation and pancreatic cyst which was noted in November 2018.  Since May 2019 she started noticing some nausea and vomiting and occasional abdominal discomfort.  She had an ultrasound in August 2019 which was essentially unremarkable.  She had a repeat endoscopic ultrasound on 10/29/2019 which showed increase in size of the cyst as well " as dilation of the main pancreatic duct.  FNA and fluid analysis showed mucinous epithelium.  She was referred to Dr. 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  Surprisingly there was no pancreatic ductal neoplasm seen but on the resected specimen stomach cancer was noted.  It was poorly differentiated adenocarcinoma with poorly cohesive/signet ring cell type with proximal gastric mucosal and serosal margins being positive.  There was lymphovascular and perineural invasion seen.  22 lymph nodes were sampled and all were benign.  It was a pT4aN0 lesion.  HER-2/christine FISH was not amplified.         EUS on 3/3/2020 without clear evidence of neoplasm endoscopically. Left gastric lymph node fine-needle aspiration was negative for carcinoma. Biopsy from the gastric jejunal anastomosis as well as lesser curvature of the stomach also did not show any malignancy.     She had a PET/CT on 3/13/2020 which showed soft tissue streaky density with increased FDG uptake adjacent to the pancreaticojejunostomy which could be neoplastic in origin.  There is mild increased FDG uptake in the gastric remnant.  Focal area of soft tissue thickening with fatty streakiness along medial laparotomy with increased FDG uptake which likely is inflammatory. Increased FDG uptake in thoracic esophagus which could be esophagitis.     She started on chemotherapy with 5FU, oxaliplatin, and Taxotere on 3/27/20. She was seen on 4/7/20 for evaluation of fevers. She was treated for possible pneumonia with Levaquin.     She was seen in clinic 4/13/20 for RUQ pain,CT and labs reassuring. She received cycles 2 and 3 of chemotherapy on 4/20/20 and 5/4/20. She was admitted from 5/21-5/23/20 with neutropenic aspiration pneumonia. She received cycle 4 on 5/27/20 with Neulasta support. PET/CT on 6/3/20 showed mild residual uptake at the site of gastrojejunal anastomosis which likely is physiologic.  There is almost resolution of  soft tissue nodule next to the pancreaticoduodenal anastomosis without FDG uptake.  There is focal increased uptake in the left posterior acetabulum which could be artifact.    Decreased dose of oxaliplatin due to neuropathy/cold sensitivity on 6/11.     Interval History:  -Continues to have diarrhea, 2x/day. Taking Imodium, about 4 pills a day   -Still eating though has no taste. Tongue feels numb. No mouth pain  -Has s/s swishes after eating.   -No thrush  -Little nausea once in a while. Better than last treatment. Using antiemetics   -Still having GERD. Tums prn   -Cold sensitivity still in mouth and hands. This is bothersome to her  -Feet are numb  -rashes on side of hands. Not itchy or tender. Red and little bumps  -Has been drinking [place] Instant Breakfast with 2% milk sometimes. Does not like Boost        Objective:  Video physical exam  General: Patient appears well in no acute distress. Normal body habitus.  Skin: No visualized rash or lesions on visualized skin  Eyes: EOMI, no erythema, sclera icterus or discharge noted  Resp: Appears to be breathing comfortably without accessory muscle usage, speaking in full sentences, no cough  MSK: Appears to have normal range of motion based on visualized movements  Neurologic: No apparent tremors, facial movements symmetric  Psych: affect normal, alert and oriented    The rest of a comprehensive physical examination is deferred due to PHE (public health emergency) video restrictions      Labs:   6/24/2020 10:36   Sodium 139   Potassium 3.0 (L)   Chloride 107   Carbon Dioxide 23   Urea Nitrogen 13   Creatinine 0.90   GFR Estimate 72   GFR Estimate If Black 84   Calcium 8.3 (L)   Anion Gap 9   Albumin 2.7 (L)   Protein Total 6.3 (L)   Bilirubin Total 0.4   Alkaline Phosphatase 194 (H)   ALT 37   AST 38   Glucose 104 (H)   WBC 10.6   Hemoglobin 9.4 (L)   Hematocrit 30.9 (L)   Platelet Count 185   RBC Count 3.54 (L)   MCV 87   MCH 26.6   MCHC 30.4 (L)   RDW 18.6 (H)    Diff Method Automated Method   % Neutrophils 61.6   % Lymphocytes 24.2   % Monocytes 11.1   % Eosinophils 0.1   % Basophils 0.7   % Immature Granulocytes 2.3   Nucleated RBCs 0   Absolute Neutrophil 6.6   Absolute Lymphocytes 2.6   Absolute Monocytes 1.2   Absolute Eosinophils 0.0   Absolute Basophils 0.1   Abs Immature Granulocytes 0.2   Absolute Nucleated RBC 0.0       Assessment and Plan:  1. 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 This was found on Whipple surgery specimen as it was done for presumed pancreatic IPMN although no pancreatic neoplasm was found.  It was a pT4aN0 lesion with lymphovascular invasion and perineural invasion seen. All 22 lymph nodes were negative.    She was started on neoadjuvant chemotherapy with FLOT (5FU, Oxaliplatin, Docetaxel) 3/27/20 with complications as noted below. Decreased oxalplatin by 20% with Cycle 5 due to persistent cold sensitivity and neuropathy. She developed mucositis this past cycle and has diarrhea and taste changes that has been influencing intake. Will decrease 5FU to 85% of dose. She will continue with cycle 6 FLOT tomorrow. Will repeat a PET scan after 8 cycles and plan for surgery.    2. GI  Gastritis: Ongoing. Continue Pantoprazole 40mg BID and Famotidine 20mg BID. Continue Tums and Simethicone prn     RUQ/epigastric pain: CT imaging with no acute findings. Suspect post surgical plus gastritis. Ok to continue Tramadol PRN. Following with palliative care.     Elevated LFT's. Mild. Likely due to oxaliplatin. Will monitor for now.     Mucositis: Admitted on 6/15 due to decreased intake. Now using s/s swishes. Symptoms improved. Can use MMW and doxepin mouthwash prn     Nausea: Continue Zofran and Zyprexa at bedtime     Diarrhea: Having 2x/day. Using Imodium about 4 pills a day. Reviewed that she can use up to 8 pills/day. Can add Lomotil if needed. Hopefully dose reducing 5FU will help as  well    3. Neuro  Baseline neuropathy in her feet for > 10 years. Initially improved with chemotherapy for unclear reasons, not back to her baseline. As above, decreased oxaliplatin by 20% due to worsening cold sensitivity.    4. FEN  Hypokalemia: Worse again. Will replace per protocol and increase potassium foods. If it is low on next check, will need daily replacement. Likely due to diarrhea and decreased intake     Weight loss, Dysgeusia: Needs to continue to push PO intake. Continue [place] Breakfast with whole milk. Suggested Premier Protein as well     Video-Visit Details    Type of service:  Video Visit    Video Start Time: 4:55 PM  Video End Time: 5:12 PM    Originating Location (pt. Location): Home    Distant Location (provider location):  Provider's Home    Platform used for Video Visit: Miguel A Eden PA-C  [facility] [address] [address]  [phone]    Transition Care Management Services  No data recorded  No data recorded  No data recorded  Interactive contact date: 5/24/2020  Face-to-face visit date: 6/24/2020    Medical complexity decision making: Moderate complexity (11452)          Again, thank you for allowing me to participate in the care of your patient.        Sincerely,        Julienne Eden PA-C

## 2020-06-25 ENCOUNTER — HOME INFUSION (PRE-WILLOW HOME INFUSION) (OUTPATIENT)
Dept: PHARMACY | Facility: CLINIC | Age: 55
End: 2020-06-25

## 2020-06-25 ENCOUNTER — INFUSION THERAPY VISIT (OUTPATIENT)
Dept: ONCOLOGY | Facility: CLINIC | Age: 55
End: 2020-06-25
Attending: INTERNAL MEDICINE
Payer: MEDICARE

## 2020-06-25 VITALS
OXYGEN SATURATION: 97 % | SYSTOLIC BLOOD PRESSURE: 91 MMHG | HEART RATE: 99 BPM | TEMPERATURE: 98.3 F | RESPIRATION RATE: 16 BRPM | WEIGHT: 174.1 LBS | DIASTOLIC BLOOD PRESSURE: 57 MMHG | BODY MASS INDEX: 25.71 KG/M2

## 2020-06-25 DIAGNOSIS — C16.9 GASTRIC ADENOCARCINOMA (H): Primary | ICD-10-CM

## 2020-06-25 DIAGNOSIS — E87.6 HYPOKALEMIA: Primary | ICD-10-CM

## 2020-06-25 PROCEDURE — 25800030 ZZH RX IP 258 OP 636: Mod: ZF | Performed by: PHYSICIAN ASSISTANT

## 2020-06-25 PROCEDURE — 96413 CHEMO IV INFUSION 1 HR: CPT

## 2020-06-25 PROCEDURE — 25000125 ZZHC RX 250: Mod: ZF | Performed by: PHYSICIAN ASSISTANT

## 2020-06-25 PROCEDURE — 25000132 ZZH RX MED GY IP 250 OP 250 PS 637: Mod: GY,ZF | Performed by: INTERNAL MEDICINE

## 2020-06-25 PROCEDURE — 96375 TX/PRO/DX INJ NEW DRUG ADDON: CPT

## 2020-06-25 PROCEDURE — 96368 THER/DIAG CONCURRENT INF: CPT

## 2020-06-25 PROCEDURE — 96417 CHEMO IV INFUS EACH ADDL SEQ: CPT

## 2020-06-25 PROCEDURE — 25000128 H RX IP 250 OP 636: Mod: ZF | Performed by: PHYSICIAN ASSISTANT

## 2020-06-25 PROCEDURE — 96367 TX/PROPH/DG ADDL SEQ IV INF: CPT

## 2020-06-25 PROCEDURE — G0498 CHEMO EXTEND IV INFUS W/PUMP: HCPCS

## 2020-06-25 PROCEDURE — 96415 CHEMO IV INFUSION ADDL HR: CPT

## 2020-06-25 RX ORDER — POTASSIUM CHLORIDE 1500 MG/1
40 TABLET, EXTENDED RELEASE ORAL ONCE
Status: COMPLETED | OUTPATIENT
Start: 2020-06-25 | End: 2020-06-25

## 2020-06-25 RX ORDER — MECLIZINE HYDROCHLORIDE 25 MG/1
25 TABLET ORAL 3 TIMES DAILY PRN
Qty: 60 TABLET | Refills: 0 | Status: SHIPPED | OUTPATIENT
Start: 2020-06-25 | End: 2020-09-24

## 2020-06-25 RX ORDER — POTASSIUM CHLORIDE 1500 MG/1
20 TABLET, EXTENDED RELEASE ORAL DAILY
Qty: 30 TABLET | Refills: 1 | Status: SHIPPED | OUTPATIENT
Start: 2020-06-25 | End: 2020-09-24

## 2020-06-25 RX ADMIN — DEXAMETHASONE SODIUM PHOSPHATE: 10 INJECTION, SOLUTION INTRAMUSCULAR; INTRAVENOUS at 10:10

## 2020-06-25 RX ADMIN — POTASSIUM CHLORIDE 40 MEQ: 1500 TABLET, EXTENDED RELEASE ORAL at 14:22

## 2020-06-25 RX ADMIN — DOCETAXEL 100 MG: 20 INJECTION, SOLUTION, CONCENTRATE INTRAVENOUS at 10:45

## 2020-06-25 RX ADMIN — LEUCOVORIN CALCIUM 400 MG: 200 INJECTION, POWDER, LYOPHILIZED, FOR SOLUTION INTRAMUSCULAR; INTRAVENOUS at 11:57

## 2020-06-25 RX ADMIN — OXALIPLATIN 135 MG: 5 INJECTION, SOLUTION INTRAVENOUS at 11:58

## 2020-06-25 RX ADMIN — SODIUM CHLORIDE 250 ML: 9 INJECTION, SOLUTION INTRAVENOUS at 10:06

## 2020-06-25 RX ADMIN — FAMOTIDINE 20 MG: 10 INJECTION INTRAVENOUS at 10:06

## 2020-06-25 RX ADMIN — DEXTROSE MONOHYDRATE 250 ML: 50 INJECTION, SOLUTION INTRAVENOUS at 11:57

## 2020-06-25 ASSESSMENT — PAIN SCALES - GENERAL: PAINLEVEL: MODERATE PAIN (4)

## 2020-06-25 NOTE — PATIENT INSTRUCTIONS
Contact Numbers  [facility]: [phone] (for symptom and scheduling needs)    Please call the [facility] Triage line if you experience a temperature greater than or equal to 100.4, shaking chills, have uncontrolled nausea, vomiting and/or diarrhea, dizziness, shortness of breath, chest pain, bleeding, unexplained bruising, or if you have any other new/concerning symptoms, questions or concerns.     If you are having any concerning symptoms or wish to speak to a provider before your next infusion visit, please call your care coordinator or triage to notify them so we can adequately serve you.     If you need a refill on a narcotic prescription or other medication, please call triage before your infusion appointment.      Orders Only on 06/24/2020   Component Date Value Ref Range Status     WBC 06/24/2020 10.6  4.0 - 11.0 10e9/L Final     RBC Count 06/24/2020 3.54* 3.8 - 5.2 10e12/L Final     Hemoglobin 06/24/2020 9.4* 11.7 - 15.7 g/dL Final     Hematocrit 06/24/2020 30.9* 35.0 - 47.0 % Final     MCV 06/24/2020 87  78 - 100 fl Final     MCH 06/24/2020 26.6  26.5 - 33.0 pg Final     MCHC 06/24/2020 30.4* 31.5 - 36.5 g/dL Final     RDW 06/24/2020 18.6* 10.0 - 15.0 % Final     Platelet Count 06/24/2020 185  150 - 450 10e9/L Final     Diff Method 06/24/2020 Automated Method   Final     % Neutrophils 06/24/2020 61.6  % Final     % Lymphocytes 06/24/2020 24.2  % Final     % Monocytes 06/24/2020 11.1  % Final     % Eosinophils 06/24/2020 0.1  % Final     % Basophils 06/24/2020 0.7  % Final     % Immature Granulocytes 06/24/2020 2.3  % Final     Nucleated RBCs 06/24/2020 0  0 /100 Final     Absolute Neutrophil 06/24/2020 6.6  1.6 - 8.3 10e9/L Final     Absolute Lymphocytes 06/24/2020 2.6  0.8 - 5.3 10e9/L Final     Absolute Monocytes 06/24/2020 1.2  0.0 - 1.3 10e9/L Final     Absolute Eosinophils 06/24/2020 0.0  0.0 - 0.7 10e9/L Final     Absolute Basophils 06/24/2020 0.1  0.0 - 0.2 10e9/L Final     Abs Immature  Granulocytes 06/24/2020 0.2  0 - 0.4 10e9/L Final     Absolute Nucleated RBC 06/24/2020 0.0   Final     Sodium 06/24/2020 139  133 - 144 mmol/L Final     Potassium 06/24/2020 3.0* 3.4 - 5.3 mmol/L Final     Chloride 06/24/2020 107  94 - 109 mmol/L Final     Carbon Dioxide 06/24/2020 23  20 - 32 mmol/L Final     Anion Gap 06/24/2020 9  3 - 14 mmol/L Final     Glucose 06/24/2020 104* 70 - 99 mg/dL Final     Urea Nitrogen 06/24/2020 13  7 - 30 mg/dL Final     Creatinine 06/24/2020 0.90  0.52 - 1.04 mg/dL Final     GFR Estimate 06/24/2020 72  >60 mL/min/[1.73_m2] Final    Comment: Non  GFR Calc  Starting 12/18/2018, serum creatinine based estimated GFR (eGFR) will be   calculated using the Chronic Kidney Disease Epidemiology Collaboration   (CKD-EPI) equation.       GFR Estimate If Black 06/24/2020 84  >60 mL/min/[1.73_m2] Final    Comment:  GFR Calc  Starting 12/18/2018, serum creatinine based estimated GFR (eGFR) will be   calculated using the Chronic Kidney Disease Epidemiology Collaboration   (CKD-EPI) equation.       Calcium 06/24/2020 8.3* 8.5 - 10.1 mg/dL Final     Bilirubin Total 06/24/2020 0.4  0.2 - 1.3 mg/dL Final     Albumin 06/24/2020 2.7* 3.4 - 5.0 g/dL Final     Protein Total 06/24/2020 6.3* 6.8 - 8.8 g/dL Final     Alkaline Phosphatase 06/24/2020 194* 40 - 150 U/L Final     ALT 06/24/2020 37  0 - 50 U/L Final     AST 06/24/2020 38  0 - 45 U/L Final

## 2020-06-25 NOTE — PROGRESS NOTES
Infusion Nursing Note:  Nathalie Bashir presents today for Day 1 Cycle 6 Taxotere, Oxaliplatin, Leucovorin, Fluorouracil pump connect.    Patient seen by provider today: No   present during visit today: Not Applicable.    Note: Nathalie was assessed by RAHEEL Whitehead yesterday and offers no changes or concerns today. She will received a dose reduction in fluorouracil pump today due to severe mucositis and diarrhea last week. Potassium replaced with oral per electrolyte protocol. Patient verbalized desire to start an oral replacement as her potassium has been consistently low for several weeks. Writer discussed with RAHEEL Whitehead and script sent to [facility] pharmacy after patient had left for the day. Writer notified patient via phone and she will  tomorrow.     Intravenous Access:  Implanted Port.    Treatment Conditions:  Lab Results   Component Value Date    HGB 9.4 06/24/2020     Lab Results   Component Value Date    WBC 10.6 06/24/2020      Lab Results   Component Value Date    ANEU 6.6 06/24/2020     Lab Results   Component Value Date     06/24/2020      Lab Results   Component Value Date     06/24/2020                   Lab Results   Component Value Date    POTASSIUM 3.0 06/24/2020           Lab Results   Component Value Date    MAG 1.9 06/16/2020            Lab Results   Component Value Date    CR 0.90 06/24/2020                   Lab Results   Component Value Date    PETRA 8.3 06/24/2020                Lab Results   Component Value Date    BILITOTAL 0.4 06/24/2020           Lab Results   Component Value Date    ALBUMIN 2.7 06/24/2020                    Lab Results   Component Value Date    ALT 37 06/24/2020           Lab Results   Component Value Date    AST 38 06/24/2020       Results reviewed, labs MET treatment parameters, ok to proceed with treatment.    Post Infusion Assessment:  Patient tolerated infusion without incident.  Blood return noted pre and post infusion and  prior to pump connect.  Fluorouracil CADD pump connected at 1400 and set to run at 10 ml/hr. Patient set up for pump disconnect at [facility] on 06/26 at 1400.   All connections verified as taped and open and pump display RUNNING by second RN.     Discharge Plan:   Prescription refills given for meclizine. Daily potassium replacement sent by RAHEEL Whitehead for patient to  and start tomorrow.   Copy of AVS reviewed with patient.  Patient will return 07/08 for next appointment.  Patient discharged in stable condition accompanied by: self.  Departure Mode: Ambulatory.    Clarice Davis RN

## 2020-06-26 ENCOUNTER — INFUSION THERAPY VISIT (OUTPATIENT)
Dept: ONCOLOGY | Facility: CLINIC | Age: 55
End: 2020-06-26
Attending: INTERNAL MEDICINE
Payer: MEDICARE

## 2020-06-26 VITALS
TEMPERATURE: 97.4 F | RESPIRATION RATE: 16 BRPM | SYSTOLIC BLOOD PRESSURE: 102 MMHG | OXYGEN SATURATION: 99 % | DIASTOLIC BLOOD PRESSURE: 65 MMHG | HEART RATE: 86 BPM

## 2020-06-26 DIAGNOSIS — C16.9 GASTRIC ADENOCARCINOMA (H): Primary | ICD-10-CM

## 2020-06-26 PROCEDURE — 25800030 ZZH RX IP 258 OP 636: Mod: ZF | Performed by: PHYSICIAN ASSISTANT

## 2020-06-26 PROCEDURE — 96361 HYDRATE IV INFUSION ADD-ON: CPT

## 2020-06-26 PROCEDURE — 96365 THER/PROPH/DIAG IV INF INIT: CPT

## 2020-06-26 PROCEDURE — 25000128 H RX IP 250 OP 636: Mod: ZF | Performed by: PHYSICIAN ASSISTANT

## 2020-06-26 PROCEDURE — 96377 APPLICATON ON-BODY INJECTOR: CPT | Mod: 59

## 2020-06-26 PROCEDURE — 96375 TX/PRO/DX INJ NEW DRUG ADDON: CPT

## 2020-06-26 RX ORDER — HEPARIN SODIUM (PORCINE) LOCK FLUSH IV SOLN 100 UNIT/ML 100 UNIT/ML
5 SOLUTION INTRAVENOUS
Status: DISCONTINUED | OUTPATIENT
Start: 2020-06-26 | End: 2020-06-26 | Stop reason: HOSPADM

## 2020-06-26 RX ORDER — HEPARIN SODIUM (PORCINE) LOCK FLUSH IV SOLN 100 UNIT/ML 100 UNIT/ML
500 SOLUTION INTRAVENOUS ONCE
Status: DISCONTINUED | OUTPATIENT
Start: 2020-06-26 | End: 2020-06-26 | Stop reason: HOSPADM

## 2020-06-26 RX ORDER — ONDANSETRON 2 MG/ML
8 INJECTION INTRAMUSCULAR; INTRAVENOUS EVERY 8 HOURS PRN
Status: CANCELLED
Start: 2020-06-26

## 2020-06-26 RX ORDER — ONDANSETRON 2 MG/ML
8 INJECTION INTRAMUSCULAR; INTRAVENOUS EVERY 8 HOURS PRN
Status: DISCONTINUED | OUTPATIENT
Start: 2020-06-26 | End: 2020-06-26 | Stop reason: HOSPADM

## 2020-06-26 RX ADMIN — Medication 5 ML: at 15:48

## 2020-06-26 RX ADMIN — ONDANSETRON 8 MG: 2 INJECTION INTRAMUSCULAR; INTRAVENOUS at 14:56

## 2020-06-26 RX ADMIN — SODIUM CHLORIDE 150 MG: 9 INJECTION, SOLUTION INTRAVENOUS at 15:15

## 2020-06-26 RX ADMIN — SODIUM CHLORIDE 1000 ML: 9 INJECTION, SOLUTION INTRAVENOUS at 14:25

## 2020-06-26 RX ADMIN — PEGFILGRASTIM 6 MG: KIT SUBCUTANEOUS at 14:20

## 2020-06-26 ASSESSMENT — PAIN SCALES - GENERAL: PAINLEVEL: SEVERE PAIN (6)

## 2020-06-26 NOTE — PROGRESS NOTES
"Infusion Nursing Note:  Nathalie Bashir presents today for Fluorouracil pump disconnect, IV fluids, and anti-emetics, and Neulasta On-Pro.    Patient seen by provider today: No   present during visit today: Not Applicable.    Note: Patient arrives \"feeling crummy\" today.  She states she has been very nauseated and hasn't been able to drink many fluids.  She also reports feeling dizzy, tired, and has increased abdominal pain today.  Vital signs checked.  HR running between 100-110 upon arrival.  /61.  Julienne Eden PA-C notified of patient's VS and symptoms today.    6/26/20 1440 TORB:  Julienne Eden PA-C/Vazquez Araujo RN  - Please give 1L NS bolus today  - Please give IV zofran and Emend to help with nausea  - Please see if patient can be added to weekend schedule for IV fluids  - Have patient follow-up with palliative regarding her pain management.  She has gastritis and is seeing palliative to help manage that.    Relayed above plan of care to patient.  Patient verbalized understanding.  Message sent to scheduling to add patient on to Sunday's infusion schedule at 1000 for IV fluids.    Fluorouracil pump empty around 1420.  A total of 240 mL infused.      Intravenous Access:  Implanted Port.    Treatment Conditions:  Not Applicable.    Post Infusion Assessment:  Patient tolerated infusion without incident.  Blood return noted pre and post infusion.  Site patent and intact, free from redness, edema or discomfort.  No evidence of extravasations.  Access discontinued per protocol.     Discharge Plan:   Patient declined prescription refills.  Discharge instructions reviewed with: Patient.  Patient and/or family verbalized understanding of discharge instructions and all questions answered.  AVS to patient via [employer].  Patient will return 6/28/20 for next appointment.   Patient discharged in stable condition accompanied by: self.  Departure Mode: Ambulatory.  Face to Face time: 8 minutes.    VAZQUEZ" BHARATHI RAMIREZ, RN

## 2020-06-26 NOTE — PROGRESS NOTES
Having increased nausea. Will give Emend today. Will add to further treatment plan as well. Can add Aloxi if still not controlled.

## 2020-06-28 ENCOUNTER — INFUSION THERAPY VISIT (OUTPATIENT)
Dept: ONCOLOGY | Facility: CLINIC | Age: 55
End: 2020-06-28
Attending: INTERNAL MEDICINE
Payer: MEDICARE

## 2020-06-28 VITALS
OXYGEN SATURATION: 100 % | RESPIRATION RATE: 16 BRPM | HEART RATE: 94 BPM | SYSTOLIC BLOOD PRESSURE: 91 MMHG | DIASTOLIC BLOOD PRESSURE: 57 MMHG | TEMPERATURE: 98.7 F

## 2020-06-28 DIAGNOSIS — C16.9 GASTRIC ADENOCARCINOMA (H): Primary | ICD-10-CM

## 2020-06-28 PROCEDURE — 25000128 H RX IP 250 OP 636: Mod: ZF | Performed by: PHYSICIAN ASSISTANT

## 2020-06-28 PROCEDURE — 25800030 ZZH RX IP 258 OP 636: Mod: ZF | Performed by: PHYSICIAN ASSISTANT

## 2020-06-28 PROCEDURE — 96361 HYDRATE IV INFUSION ADD-ON: CPT

## 2020-06-28 PROCEDURE — 25000128 H RX IP 250 OP 636: Mod: ZF | Performed by: INTERNAL MEDICINE

## 2020-06-28 PROCEDURE — 96374 THER/PROPH/DIAG INJ IV PUSH: CPT

## 2020-06-28 RX ORDER — ONDANSETRON 2 MG/ML
8 INJECTION INTRAMUSCULAR; INTRAVENOUS EVERY 8 HOURS PRN
Status: DISCONTINUED | OUTPATIENT
Start: 2020-06-28 | End: 2020-06-28 | Stop reason: HOSPADM

## 2020-06-28 RX ORDER — HEPARIN SODIUM (PORCINE) LOCK FLUSH IV SOLN 100 UNIT/ML 100 UNIT/ML
5 SOLUTION INTRAVENOUS ONCE
Status: COMPLETED | OUTPATIENT
Start: 2020-06-28 | End: 2020-06-28

## 2020-06-28 RX ORDER — ONDANSETRON 2 MG/ML
8 INJECTION INTRAMUSCULAR; INTRAVENOUS EVERY 8 HOURS PRN
Status: CANCELLED
Start: 2020-06-28

## 2020-06-28 RX ADMIN — SODIUM CHLORIDE 1000 ML: 9 INJECTION, SOLUTION INTRAVENOUS at 10:23

## 2020-06-28 RX ADMIN — ONDANSETRON 8 MG: 2 INJECTION INTRAMUSCULAR; INTRAVENOUS at 11:19

## 2020-06-28 RX ADMIN — Medication 5 ML: at 11:42

## 2020-06-28 ASSESSMENT — PAIN SCALES - GENERAL: PAINLEVEL: SEVERE PAIN (7)

## 2020-06-28 NOTE — PROGRESS NOTES
"Infusion Nursing Note:  Nathalie Bashir presents today for 1L IVF's/Antiemetics.    Patient seen by provider today: No   present during visit today: Not Applicable.    Note: Patient reports \"6.5\"/10 abdominal pain accompanied with nausea/acid reflux. Took 8mg Zofran, Protonix, and Tramadol this morning shortly before arrival. She received Emend on Friday - too early to receive another dose. Patient would like to wait to see how she feels after some fluids before trying IV Zofran. Pt able to eat oatmeal this morning and had Chinese for dinner last night which she said tasted good.    IV Zofran given after most of fluids administered, and patient reported feeling much better after \"belching\".    Intravenous Access:  Implanted Port.    Treatment Conditions:  Not Applicable.      Post Infusion Assessment:  Patient tolerated infusion without incident.  Blood return noted pre and post infusion.  Site patent and intact, free from redness, edema or discomfort.  No evidence of extravasations.  Access discontinued per protocol.       Discharge Plan:   Patient declined prescription refills.  Discharge instructions reviewed with: Patient.  Patient and/or family verbalized understanding of discharge instructions and all questions answered.  AVS to patient via [employer].  Patient will return 7/9 for next appointment.   Patient discharged in stable condition accompanied by: self.  Departure Mode: Ambulatory.    Marcie Quispe RN                        "

## 2020-06-29 ENCOUNTER — TELEPHONE (OUTPATIENT)
Dept: ONCOLOGY | Facility: CLINIC | Age: 55
End: 2020-06-29

## 2020-06-29 DIAGNOSIS — T45.1X5A CHEMOTHERAPY-INDUCED NEUTROPENIA (H): ICD-10-CM

## 2020-06-29 DIAGNOSIS — D70.1 CHEMOTHERAPY-INDUCED NEUTROPENIA (H): ICD-10-CM

## 2020-06-29 NOTE — TELEPHONE ENCOUNTER
Pt reports she was hospitalized with mucositis and esophageal inflammation/sore throat. Got better and now is back. Does have a mouth sore or 2 but its mainly in her throat. Unable to eat and drink due to pain down the throat. Taking zofran for nausea. No vomiting. Temp 99.7 today. Continue salt and soda rinses, MMW, bland foods and protein shakes. Contact palliative re pain meds. Ok for fluids tmrw if pt wishes.    Paged to Julienne Eden, GIOVANNY    To continue s/s rinses, MMW, msg will route message to palliative to help address pain control as pt doesn't feel tramadol is helpful. Discussed need for bland diet. Not spicy, fatty or greasy foods. Protein shakes are a good option too. Pt will push fluids today and call tmrw morning if she feels she should come in for fluids

## 2020-06-30 ENCOUNTER — PATIENT OUTREACH (OUTPATIENT)
Dept: PALLIATIVE CARE | Facility: CLINIC | Age: 55
End: 2020-06-30

## 2020-06-30 DIAGNOSIS — C16.9 GASTRIC ADENOCARCINOMA (H): Primary | ICD-10-CM

## 2020-06-30 RX ORDER — OXYCODONE HYDROCHLORIDE 5 MG/1
5 TABLET ORAL EVERY 4 HOURS PRN
Qty: 20 TABLET | Refills: 0 | Status: SHIPPED | OUTPATIENT
Start: 2020-06-30 | End: 2020-07-09

## 2020-06-30 NOTE — PROGRESS NOTES
Called patient - she is willing to try oxycodone. We reviewed instructions, discussed trying to take 30-60 mins before trying to eat. We discussed no tramadol when she gets oxycodone. Also discussed side effects, including constipation. She has senna at home that she will start.     Reviewed med safety, particularly opiates and benzos and discussed narcan script as well.     Patient was able to verbalize understanding and agreement as well as repeated my instructions back to me. She understands this is a small supply.    She will keep me updated on how she is doing.

## 2020-06-30 NOTE — PROGRESS NOTES
Received note from oncology team advising patient is not getting pain relief from tramadol.     Called patient to assess - she tells me that she is having a hard time with the throat inflammation she has and swallowing, so eating is a real challenge.     Was hospitalized for mucositis following chemotherapy - was given magic mouth wash, doxepin, and told to continue salt and soda swishes. She tells me that both magic mouth wash and doxepin make her nauseated and vomit. As does soda in the salt and soda rinses, only doing salt rinses right now due to this.     Sores/inflammation in mouth is gone, but she reports she still has swollen glands and her neck is still really, really painful when eating. Hurts anytime she swallows, states it seems that never calms down. When she looks in her mouth she does not see obvious sores of inflammation, just redness. No thrush.     She tells me that she is trying to eat a bland diet, had eggs and toast today. Eggs went okay, toast hurt - had to make toast soggy to tolerate. Thinks she is drinking plenty of fluids, does not feel she needs fluids in clinic today. Feels at most meals she takes one bite of food and it hurts her throat/doesnt taste good so she cannot finish it.     Has tried tramadol for this pain, not really finding it effective. She tells me that when she takes it she gets a little relief for 1.5-2 hours, but it doesn't help with eating. She has tried to time it before she plans to eat and it doesn't help. Has only #10 tabs left right now.     She would really like to be able to tolerate swallowing and eating without the pain, wonders if there is another medication that she can try to help with this.     Notes that she has been trying to eat what she can tolerate including soft foods, but feels limited as cannot tolerate cold shakes/ice cream due to chemo and cold sensitivity.     Advised I would update her onc team and palliative team and ask for suggestions and  follow up with her. She was okay with that plan and thanked me for the call.    15 mins spent on the phone with patient.

## 2020-07-01 ENCOUNTER — TELEPHONE (OUTPATIENT)
Dept: ONCOLOGY | Facility: CLINIC | Age: 55
End: 2020-07-01

## 2020-07-01 DIAGNOSIS — C16.3 MALIGNANT NEOPLASM OF PYLORIC ANTRUM (H): ICD-10-CM

## 2020-07-01 DIAGNOSIS — D49.0 IPMN (INTRADUCTAL PAPILLARY MUCINOUS NEOPLASM): ICD-10-CM

## 2020-07-01 DIAGNOSIS — R10.13 ABDOMINAL PAIN, EPIGASTRIC: ICD-10-CM

## 2020-07-01 NOTE — TELEPHONE ENCOUNTER
Pt called in to triage requesting IVF for tomorrow, stated she was told she could call in for fluids but didn't know the process. Stated due to mucositis and throat pain she is having difficulty eating and drinking but has been drinking water and feels ok to wait until tomorrow. Her  works until 430 today so she can't come in today anyway. Denied fevers, chills, dizziness, weakness, n/v/d. Palliative care instructed her to start Oxycodone for pain, she has been using 2-3 tabs a day before she eats and state this helps tremendously. Pt has therapy plan in place. Per 6/29 triage note ok for IVF if pt feels she's not hydrating enough at home.    Pt scheduled for 7/2 at 1 pm and notified, asked her to call back sooner if symptoms worse before then, she verbalized understanding.

## 2020-07-02 ENCOUNTER — INFUSION THERAPY VISIT (OUTPATIENT)
Dept: INFUSION THERAPY | Facility: CLINIC | Age: 55
End: 2020-07-02
Attending: PHYSICIAN ASSISTANT
Payer: MEDICARE

## 2020-07-02 VITALS
SYSTOLIC BLOOD PRESSURE: 103 MMHG | DIASTOLIC BLOOD PRESSURE: 67 MMHG | HEART RATE: 96 BPM | OXYGEN SATURATION: 99 % | RESPIRATION RATE: 16 BRPM | TEMPERATURE: 99.1 F

## 2020-07-02 DIAGNOSIS — C16.9 GASTRIC ADENOCARCINOMA (H): Primary | ICD-10-CM

## 2020-07-02 PROCEDURE — 96374 THER/PROPH/DIAG INJ IV PUSH: CPT

## 2020-07-02 PROCEDURE — 25000128 H RX IP 250 OP 636: Mod: ZF | Performed by: PHYSICIAN ASSISTANT

## 2020-07-02 PROCEDURE — 25800030 ZZH RX IP 258 OP 636: Mod: ZF | Performed by: PHYSICIAN ASSISTANT

## 2020-07-02 RX ORDER — HEPARIN SODIUM (PORCINE) LOCK FLUSH IV SOLN 100 UNIT/ML 100 UNIT/ML
5 SOLUTION INTRAVENOUS ONCE
Status: COMPLETED | OUTPATIENT
Start: 2020-07-02 | End: 2020-07-02

## 2020-07-02 RX ORDER — ONDANSETRON 2 MG/ML
8 INJECTION INTRAMUSCULAR; INTRAVENOUS EVERY 8 HOURS PRN
Status: CANCELLED
Start: 2020-07-02

## 2020-07-02 RX ORDER — ONDANSETRON 2 MG/ML
8 INJECTION INTRAMUSCULAR; INTRAVENOUS EVERY 8 HOURS PRN
Status: DISCONTINUED | OUTPATIENT
Start: 2020-07-02 | End: 2020-07-02 | Stop reason: HOSPADM

## 2020-07-02 RX ADMIN — SODIUM CHLORIDE 1000 ML: 9 INJECTION, SOLUTION INTRAVENOUS at 13:17

## 2020-07-02 RX ADMIN — ONDANSETRON 8 MG: 2 INJECTION INTRAMUSCULAR; INTRAVENOUS at 13:30

## 2020-07-02 RX ADMIN — Medication 5 ML: at 14:23

## 2020-07-02 NOTE — LETTER
7/2/2020         RE: Nathalie Bashir  [address] [id number]        Dear Colleague,    Thank you for referring your patient, Nathalie Bashir, to the [facility]. Please see a copy of my visit note below.    Nursing Note  [facility] Bash[facility]:   Chief Complaint   Patient presents with     Infusion     ondansetron and fluids     During today's [facility] appointment, orders from Julienne Eden PA-C were completed.  Frequency: as needed    Progress note:  Patient identification verified by name and date of birth.  Assessment completed.  Vitals recorded in Doc Flowsheets.  Patient was provided with education regarding medication/procedure and possible side effects.  Patient verbalized understanding.     present during visit today: Not Applicable.    Treatment Conditions: Non-applicable.    Premedications: zofran    Drug Waste Record: No    Infusion length and rate:  999 ml/hr.    Labs: not drawn today    Vascular access: port accessed today.    Post Infusion Assessment:  Patient tolerated infusion without incident.     Discharge Plan:   Follow up plan of care with: ongoing infusions [facility].  Discharge instructions were reviewed with patient.  Patient/representative verbalized understanding of discharge instructions and all questions answered.  Patient discharged [facility] in stable condition.    Karina Smith RN    Administrations This Visit     0.9% sodium chloride BOLUS     Admin Date  07/02/2020 Action  New Bag Dose  1000 mL Route  Intravenous Administered By  Karina Smith RN          heparin 100 UNIT/ML injection 5 mL     Admin Date  07/02/2020 Action  Given Dose  5 mL Route  Intracatheter Administered By  Jaed Fleming RN          ondansetron (ZOFRAN) injection 8 mg      Admin Date  07/02/2020 Action  Given Dose  8 mg Route  Intravenous Administered By  Karina Smith RN                /67   Pulse 96   Temp 99.1  F (37.3  C) (Oral)   Resp 16   LMP 09/23/2014   SpO2 99%         Again, thank you for allowing me to participate in the care of your patient.        Sincerely,        [facility]     Diffuse wheezing on exam, no hx of pulmonary disease, TTE with normal LVSF, no documented diastolic dysfunction, infection less likely as patient remains afebrile with improvement in leukocytosis, CXR clear, RVP neg x2; possible reactive airway disease and/or atelectasis   F/U CT chest with IV contrast  Conservative management for now, nebs q4, chest PT   OOB to chair when possible  Pulmonary  recommendations appreciated

## 2020-07-02 NOTE — PROGRESS NOTES
Nursing Note  Nathalie Bashir [facility] for:   Chief Complaint   Patient presents with     Infusion     ondansetron and fluids     During today's [facility] appointment, orders from Julienne Eden PA-C were completed.  Frequency: as needed    Progress note:  Patient identification verified by name and date of birth.  Assessment completed.  Vitals recorded in Doc Flowsheets.  Patient was provided with education regarding medication/procedure and possible side effects.  Patient verbalized understanding.     present during visit today: Not Applicable.    Treatment Conditions: Non-applicable.    Premedications: zofran    Drug Waste Record: No    Infusion length and rate:  999 ml/hr.    Labs: not drawn today    Vascular access: port accessed today.    Post Infusion Assessment:  Patient tolerated infusion without incident.     Discharge Plan:   Follow up plan of care with: ongoing infusions [facility].  Discharge instructions were reviewed with patient.  Patient/representative verbalized understanding of discharge instructions and all questions answered.  Patient discharged [facility] in stable condition.    Karina Smith RN    Administrations This Visit     0.9% sodium chloride BOLUS     Admin Date  07/02/2020 Action  New Bag Dose  1000 mL Route  Intravenous Administered By  Karina Smith RN          heparin 100 UNIT/ML injection 5 mL     Admin Date  07/02/2020 Action  Given Dose  5 mL Route  Intracatheter Administered By  Jade Fleming RN          ondansetron (ZOFRAN) injection 8 mg     Admin Date  07/02/2020 Action  Given Dose  8 mg Route  Intravenous Administered By  Karina Smith RN                /67   Pulse 96   Temp 99.1  F (37.3  C) (Oral)   Resp 16   LMP 09/23/2014   SpO2 99%

## 2020-07-03 ENCOUNTER — TELEPHONE (OUTPATIENT)
Dept: ONCOLOGY | Facility: CLINIC | Age: 55
End: 2020-07-03

## 2020-07-03 RX ORDER — PANTOPRAZOLE SODIUM 40 MG/1
TABLET, DELAYED RELEASE ORAL
Qty: 60 TABLET | Refills: 1 | Status: SHIPPED | OUTPATIENT
Start: 2020-07-03 | End: 2020-09-22

## 2020-07-03 NOTE — TELEPHONE ENCOUNTER
"Per 6/24 visit notes \"2. GI  Gastritis: Ongoing. Continue Pantoprazole 40mg BID and Famotidine 20mg BID. Continue Tums and Simethicone prn\"  Refilling.  "

## 2020-07-03 NOTE — TELEPHONE ENCOUNTER
Pt calling to report diarrhea x 4 episodes yesterday and 3 today. Yesterday took 3 imodium. Is pushing fluids. Feels hydrated currently. Mouth is feeling better. Denies other sx. Instructed on use of imodium and up to 8 tabs max per day. Will try this and continued with fluids. To call triage if new or worsening sx. Pt states understanding

## 2020-07-06 NOTE — PROGRESS NOTES
This is a recent snapshot of the patient's [facility] medical record.  For current drug dose and complete information and questions, call [phone] or In Basket pool, [facility] home infusion ([id number])  CSN Number:  [id number]

## 2020-07-08 ENCOUNTER — VIRTUAL VISIT (OUTPATIENT)
Dept: ONCOLOGY | Facility: CLINIC | Age: 55
End: 2020-07-08
Attending: PHYSICIAN ASSISTANT
Payer: MEDICARE

## 2020-07-08 ENCOUNTER — TELEPHONE (OUTPATIENT)
Dept: ONCOLOGY | Facility: CLINIC | Age: 55
End: 2020-07-08

## 2020-07-08 VITALS
WEIGHT: 166.9 LBS | HEIGHT: 69 IN | TEMPERATURE: 97.5 F | HEART RATE: 120 BPM | OXYGEN SATURATION: 100 % | RESPIRATION RATE: 16 BRPM | BODY MASS INDEX: 24.72 KG/M2 | DIASTOLIC BLOOD PRESSURE: 68 MMHG | SYSTOLIC BLOOD PRESSURE: 101 MMHG

## 2020-07-08 VITALS
BODY MASS INDEX: 24.65 KG/M2 | WEIGHT: 166.9 LBS | DIASTOLIC BLOOD PRESSURE: 68 MMHG | RESPIRATION RATE: 16 BRPM | SYSTOLIC BLOOD PRESSURE: 101 MMHG | HEART RATE: 120 BPM | TEMPERATURE: 97.5 F | OXYGEN SATURATION: 100 %

## 2020-07-08 DIAGNOSIS — T45.1X5A CHEMOTHERAPY-INDUCED NEUTROPENIA (H): ICD-10-CM

## 2020-07-08 DIAGNOSIS — D70.1 CHEMOTHERAPY-INDUCED NEUTROPENIA (H): ICD-10-CM

## 2020-07-08 DIAGNOSIS — C16.9 GASTRIC ADENOCARCINOMA (H): Primary | ICD-10-CM

## 2020-07-08 DIAGNOSIS — R19.7 DIARRHEA, UNSPECIFIED TYPE: ICD-10-CM

## 2020-07-08 LAB
ALBUMIN SERPL-MCNC: 2.5 G/DL (ref 3.4–5)
ALP SERPL-CCNC: 198 U/L (ref 40–150)
ALT SERPL W P-5'-P-CCNC: 23 U/L (ref 0–50)
ANION GAP SERPL CALCULATED.3IONS-SCNC: 10 MMOL/L (ref 3–14)
AST SERPL W P-5'-P-CCNC: 33 U/L (ref 0–45)
BASOPHILS # BLD AUTO: 0.1 10E9/L (ref 0–0.2)
BASOPHILS NFR BLD AUTO: 0.4 %
BILIRUB SERPL-MCNC: 0.6 MG/DL (ref 0.2–1.3)
BUN SERPL-MCNC: 10 MG/DL (ref 7–30)
CALCIUM SERPL-MCNC: 7.9 MG/DL (ref 8.5–10.1)
CHLORIDE SERPL-SCNC: 103 MMOL/L (ref 94–109)
CO2 SERPL-SCNC: 22 MMOL/L (ref 20–32)
CREAT SERPL-MCNC: 0.93 MG/DL (ref 0.52–1.04)
DIFFERENTIAL METHOD BLD: ABNORMAL
EOSINOPHIL # BLD AUTO: 0 10E9/L (ref 0–0.7)
EOSINOPHIL NFR BLD AUTO: 0.1 %
ERYTHROCYTE [DISTWIDTH] IN BLOOD BY AUTOMATED COUNT: 20 % (ref 10–15)
GFR SERPL CREATININE-BSD FRML MDRD: 70 ML/MIN/{1.73_M2}
GLUCOSE SERPL-MCNC: 162 MG/DL (ref 70–99)
HCT VFR BLD AUTO: 34.6 % (ref 35–47)
HGB BLD-MCNC: 10.8 G/DL (ref 11.7–15.7)
IMM GRANULOCYTES # BLD: 0.3 10E9/L (ref 0–0.4)
IMM GRANULOCYTES NFR BLD: 1.9 %
LYMPHOCYTES # BLD AUTO: 1.4 10E9/L (ref 0.8–5.3)
LYMPHOCYTES NFR BLD AUTO: 8.5 %
MAGNESIUM SERPL-MCNC: 1.6 MG/DL (ref 1.6–2.3)
MCH RBC QN AUTO: 26.3 PG (ref 26.5–33)
MCHC RBC AUTO-ENTMCNC: 31.2 G/DL (ref 31.5–36.5)
MCV RBC AUTO: 84 FL (ref 78–100)
MONOCYTES # BLD AUTO: 1.1 10E9/L (ref 0–1.3)
MONOCYTES NFR BLD AUTO: 6.8 %
NEUTROPHILS # BLD AUTO: 13.6 10E9/L (ref 1.6–8.3)
NEUTROPHILS NFR BLD AUTO: 82.3 %
NRBC # BLD AUTO: 0 10*3/UL
NRBC BLD AUTO-RTO: 0 /100
PLATELET # BLD AUTO: 218 10E9/L (ref 150–450)
POTASSIUM SERPL-SCNC: 2.9 MMOL/L (ref 3.4–5.3)
PROT SERPL-MCNC: 5.8 G/DL (ref 6.8–8.8)
RBC # BLD AUTO: 4.11 10E12/L (ref 3.8–5.2)
SODIUM SERPL-SCNC: 135 MMOL/L (ref 133–144)
WBC # BLD AUTO: 16.5 10E9/L (ref 4–11)

## 2020-07-08 PROCEDURE — 85025 COMPLETE CBC W/AUTO DIFF WBC: CPT | Performed by: PHYSICIAN ASSISTANT

## 2020-07-08 PROCEDURE — 99214 OFFICE O/P EST MOD 30 MIN: CPT | Mod: 95 | Performed by: PHYSICIAN ASSISTANT

## 2020-07-08 PROCEDURE — 36591 DRAW BLOOD OFF VENOUS DEVICE: CPT

## 2020-07-08 PROCEDURE — 25000128 H RX IP 250 OP 636: Performed by: INTERNAL MEDICINE

## 2020-07-08 PROCEDURE — 83735 ASSAY OF MAGNESIUM: CPT | Mod: GT | Performed by: PHYSICIAN ASSISTANT

## 2020-07-08 PROCEDURE — 80053 COMPREHEN METABOLIC PANEL: CPT | Performed by: PHYSICIAN ASSISTANT

## 2020-07-08 RX ORDER — HEPARIN SODIUM (PORCINE) LOCK FLUSH IV SOLN 100 UNIT/ML 100 UNIT/ML
5 SOLUTION INTRAVENOUS ONCE
Status: COMPLETED | OUTPATIENT
Start: 2020-07-08 | End: 2020-07-08

## 2020-07-08 RX ADMIN — HEPARIN SODIUM (PORCINE) LOCK FLUSH IV SOLN 100 UNIT/ML 5 ML: 100 SOLUTION at 10:30

## 2020-07-08 ASSESSMENT — PAIN SCALES - GENERAL
PAINLEVEL: MODERATE PAIN (4)
PAINLEVEL: MODERATE PAIN (4)

## 2020-07-08 ASSESSMENT — MIFFLIN-ST. JEOR: SCORE: 1426.68

## 2020-07-08 NOTE — NURSING NOTE
"Chief Complaint   Patient presents with     Port Draw     port accessed and labs drawn by rn in lab.  vital signs taken.     Port accessed by RN in lab with 20g 3/4\" power needle, labs drawn, port flushed with saline and heparin, vitals checked.  Roxana Sweet paged regarding pt's elevated pulse.    Joann Andrews RN      "

## 2020-07-08 NOTE — PROGRESS NOTES
"Nathalie Bashir is a 54 year old female who is being evaluated via a billable video visit.      The patient has been notified of following:     \"This video visit will be conducted via a call between you and your physician/provider. We have found that certain health care needs can be provided without the need for an in-person physical exam.  This service lets us provide the care you need with a video conversation.  If a prescription is necessary we can send it directly to your pharmacy.  If lab work is needed we can place an order for that and you can then stop by our lab to have the test done at a later time.    Video visits are billed at different rates depending on your insurance coverage.  Please reach out to your insurance provider with any questions.    If during the course of the call the physician/provider feels a video visit is not appropriate, you will not be charged for this service.\"    Patient has given verbal consent for Video visit? Yes    How would you like to obtain your AVS? Kelsey Harper LPN      Video-Visit Details    Type of service:  Video Visit    Video Start Time: 02:28 pm  Video End Time: 03:01 pm    Originating Location (pt. Location): Home    Distant Location (provider location):  [facility]     Platform used for Video Visit: [facility]      Oncology/Hematology Visit Note  Jul 8, 2020    Reason for Visit: Follow up of gastric adenocarcinoma.    History of Present Illness:   Ms. Bashir is a 54 year old female with a history of gastric adenocarcinoma.  She was being followed for a pancreatic duct dilatation and pancreatic cyst which was noted in November 2018.  Since May 2019 she started noticing some nausea and vomiting and occasional abdominal discomfort.  She had an ultrasound in August 2019 which was essentially unremarkable.  She had a repeat endoscopic ultrasound on 10/29/2019 which showed increase in size of the cyst as well as dilation of the main " pancreatic duct.  FNA and fluid analysis showed mucinous epithelium.  She was referred to Dr. 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  Surprisingly there was no pancreatic ductal neoplasm seen but on the resected specimen stomach cancer was noted.  It was poorly differentiated adenocarcinoma with poorly cohesive/signet ring cell type with proximal gastric mucosal and serosal margins being positive.  There was lymphovascular and perineural invasion seen.  22 lymph nodes were sampled and all were benign.  It was a pT4aN0 lesion.  HER-2/christine FISH was not amplified.         EUS on 3/3/2020 without clear evidence of neoplasm endoscopically. Left gastric lymph node fine-needle aspiration was negative for carcinoma. Biopsy from the gastric jejunal anastomosis as well as lesser curvature of the stomach also did not show any malignancy.     She had a PET/CT on 3/13/2020 which showed soft tissue streaky density with increased FDG uptake adjacent to the pancreaticojejunostomy which could be neoplastic in origin.  There is mild increased FDG uptake in the gastric remnant.  Focal area of soft tissue thickening with fatty streakiness along medial laparotomy with increased FDG uptake which likely is inflammatory. Increased FDG uptake in thoracic esophagus which could be esophagitis.      She started on chemotherapy with 5FU, oxaliplatin, and Taxotere on 3/27/20. She was seen on 4/7/20 for evaluation of fevers. She was treated for possible pneumonia with Levaquin.      She was seen in clinic 4/13/20 for RUQ pain,CT and labs reassuring. She received cycles 2 and 3 of chemotherapy on 4/20/20 and 5/4/20. She was admitted from 5/21-5/23/20 with neutropenic aspiration pneumonia. She received cycle 4 on 5/27/20 with Neulasta support. PET/CT on 6/3/20 showed mild residual uptake at the site of gastrojejunal anastomosis which likely is physiologic.  There is almost resolution of soft tissue nodule  next to the pancreaticoduodenal anastomosis without FDG uptake.  There is focal increased uptake in the left posterior acetabulum which could be artifact.     Decreased dose of oxaliplatin due to neuropathy/cold sensitivity on 6/11. Decreased 5FU to 85% with cycle #6 due to mucositis/diarrhea/taste changes.     Interval History:    Nathalie was met with via video today. She has had a rough few weeks.    Diarrhea developed on Thursday/Friday of last week. She was having 4 episodes despite taking imodium- she does believe she took 8 of the imodium on her high diarrhea day. She was then fearful that the imodium was making her more dehydrated so stopped taking it. Thankfully the diarrhea then improved on its own. She had a few days without diarrhea and only had 1 episode today. Abdominal pain is stable. No blood in stool. Denies fevers or chills. Nausea is controlled with zofran.    She is noting worsening dizziness, particularly when changing positions. Denies changes in vision or new headache. Fluid intake has been limited as cold fluids irritate her mouth and she was having thick mucous in her mouth that made swallowing hard. She was trying salt/soda rinses but thought it might be drying out her mouth. Not consistently using biotene. The mucous is improved today. Aside from with cold, her mouth does not hurt and she has not noted a white coating in mouth. This has also reduced her food intake but she is going to try to eat more now that the mouth feels better.   - Mentioned that she developed a dry, red, painful rash to bilateral wrists and right foot after last treatment. She has been using lotion with slow improvement.  - Neuropathy in her feet is stable. Still able to walk without difficulty.   - It was noted that her heart rate was fast on VS today. She states she tends to have a fast heart rate when she is moving around chronically. Denies any chest pain or shortness of breath with this.   - She otherwise denies  any swelling, bleeding, or urinary symptoms.       Current Outpatient Medications   Medication Sig Dispense Refill     amitriptyline (ELAVIL) 25 MG tablet Take 3 tablets by mouth At Bedtime       benzocaine-menthol (CEPACOL) 15-3.6 MG lozenge Place 1 lozenge inside cheek every hour as needed for sore throat 15 lozenge 1     calcium carb 1250 mg, 500 mg Tanana,/vitamin D 200 units (OSCAL WITH D) 500-200 MG-UNIT per tablet Take 1 tablet by mouth 3 times daily (with meals)       citalopram (CELEXA) 20 MG tablet Take 20 mg by mouth every morning        dexamethasone (DECADRON) 4 MG tablet Take 1 tablet (4mg) the day before, day of, and 2 days after chemotherapy 12 tablet 3     diclofenac (FLECTOR) 1.3 % patch Place 1 patch onto the skin daily as needed        doxepin (SINEQUAN) 10 MG/ML (HIGH CONC) solution Take 1 mL (10 mg) by mouth 2 times daily 15 mL 1     famotidine (PEPCID) 20 MG tablet Take 1 tablet (20 mg) by mouth 2 times daily 60 tablet 3     fluticasone (FLONASE) 50 MCG/ACT nasal spray Spray 2 sprays into both nostrils daily as needed        hydrOXYzine (ATARAX) 10 MG tablet Take 10 mg by mouth 3 times daily as needed for itching       LORazepam (ATIVAN) 0.5 MG tablet Take 1 tablet (0.5 mg) by mouth every 4 hours as needed (Anxiety, Nausea/Vomiting or Sleep) 30 tablet 5     magic mouthwash suspension, diphenhydrAMINE, lidocaine, aluminum-magnesium & simethicone, (FIRST-MOUTHWASH BLM) compounding kit Swish and spit 10 mLs in mouth every 6 hours as needed for mouth sores (Patient not taking: Reported on 7/9/2020) 1 Bottle 1     meclizine (ANTIVERT) 25 MG tablet Take 1 tablet (25 mg) by mouth 3 times daily as needed for dizziness 60 tablet 0     multivitamin, therapeutic with minerals (MULTI-VITAMIN) TABS Take 1 tablet by mouth daily       naloxone (NARCAN) 4 MG/0.1ML nasal spray Spray 1 spray (4 mg) into one nostril alternating nostrils once as needed for opioid reversal every 2-3 minutes until assistance arrives  "(Patient not taking: Reported on 7/9/2020) 0.2 mL 0     OLANZapine zydis (ZYPREXA) 5 MG ODT Take 1 tablet (5 mg) by mouth At Bedtime 30 tablet 0     ondansetron (ZOFRAN) 8 MG tablet Take 1 tablet (8 mg) by mouth every 8 hours as needed (Nausea/Vomiting) 90 tablet 3     ondansetron (ZOFRAN-ODT) 4 MG ODT tab Take 1 tablet (4 mg) by mouth every 6 hours as needed for nausea or vomiting 90 tablet 3     pantoprazole (PROTONIX) 40 MG EC tablet TAKE ONE TABLET BY MOUTH TWICE A DAY 60 tablet 1     potassium chloride ER (KLOR-CON M) 20 MEQ CR tablet Take 1 tablet (20 mEq) by mouth daily 30 tablet 1     senna-docusate (SENOKOT-S/PERICOLACE) 8.6-50 MG tablet Take 1 tablet by mouth daily as needed for constipation (Patient not taking: Reported on 7/9/2020) 30 tablet 0     simethicone 80 MG TABS Take 1 tablet by mouth every 6 hours as needed (bloating, gas, belching) 90 tablet 3     SUMAtriptan (IMITREX) 100 MG tablet Take 100 mg by mouth as needed       tiZANidine (ZANAFLEX) 4 MG tablet Take 1 tablet by mouth 2 times daily as needed       loperamide (IMODIUM) 2 MG capsule Take 2 mg by mouth 4 times daily as needed for diarrhea         Physical Examination:  /68 (BP Location: Right arm)   Pulse 120   Temp 97.5  F (36.4  C) (Oral)   Resp 16   Ht 1.753 m (5' 9.02\")   Wt 75.7 kg (166 lb 14.4 oz)   LMP 09/23/2014   SpO2 100%   BMI 24.64 kg/m    Wt Readings from Last 10 Encounters:   07/08/20 75.7 kg (166 lb 14.4 oz)   07/08/20 75.7 kg (166 lb 14.4 oz)   06/25/20 79 kg (174 lb 1.6 oz)   06/24/20 78.9 kg (174 lb)   06/24/20 79 kg (174 lb 1.6 oz)   06/17/20 81 kg (178 lb 9.6 oz)   06/10/20 79.7 kg (175 lb 12.8 oz)   06/10/20 81.6 kg (180 lb)   05/27/20 83.6 kg (184 lb 3.2 oz)   05/23/20 83.7 kg (184 lb 9.6 oz)     GENERAL: Healthy, alert and no distress  EYES: Eyes grossly normal to inspection.  No discharge or erythema, or obvious scleral/conjunctival abnormalities.  RESP: No audible wheeze, cough, or visible cyanosis.  " No visible retractions or increased work of breathing.    SKIN: Visible skin clear. Dry, mildly erythematous skin noted on wrists/hands and right foot. Full assessment of skin was limited by video.   NEURO: Cranial nerves grossly intact.  Mentation and speech appropriate for age.  PSYCH: Mentation appears normal, affect normal/bright, judgement and insight intact, normal speech and appearance well-groomed.    Laboratory Data:  Orders Only on 07/08/2020   Component Date Value Ref Range Status     WBC 07/08/2020 16.5* 4.0 - 11.0 10e9/L Final     RBC Count 07/08/2020 4.11  3.8 - 5.2 10e12/L Final     Hemoglobin 07/08/2020 10.8* 11.7 - 15.7 g/dL Final     Hematocrit 07/08/2020 34.6* 35.0 - 47.0 % Final     MCV 07/08/2020 84  78 - 100 fl Final     MCH 07/08/2020 26.3* 26.5 - 33.0 pg Final     MCHC 07/08/2020 31.2* 31.5 - 36.5 g/dL Final     RDW 07/08/2020 20.0* 10.0 - 15.0 % Final     Platelet Count 07/08/2020 218  150 - 450 10e9/L Final     Diff Method 07/08/2020 Automated Method   Final     % Neutrophils 07/08/2020 82.3  % Final     % Lymphocytes 07/08/2020 8.5  % Final     % Monocytes 07/08/2020 6.8  % Final     % Eosinophils 07/08/2020 0.1  % Final     % Basophils 07/08/2020 0.4  % Final     % Immature Granulocytes 07/08/2020 1.9  % Final     Nucleated RBCs 07/08/2020 0  0 /100 Final     Absolute Neutrophil 07/08/2020 13.6* 1.6 - 8.3 10e9/L Final     Absolute Lymphocytes 07/08/2020 1.4  0.8 - 5.3 10e9/L Final     Absolute Monocytes 07/08/2020 1.1  0.0 - 1.3 10e9/L Final     Absolute Eosinophils 07/08/2020 0.0  0.0 - 0.7 10e9/L Final     Absolute Basophils 07/08/2020 0.1  0.0 - 0.2 10e9/L Final     Abs Immature Granulocytes 07/08/2020 0.3  0 - 0.4 10e9/L Final     Absolute Nucleated RBC 07/08/2020 0.0   Final     Sodium 07/08/2020 135  133 - 144 mmol/L Final     Potassium 07/08/2020 2.9* 3.4 - 5.3 mmol/L Final     Chloride 07/08/2020 103  94 - 109 mmol/L Final     Carbon Dioxide 07/08/2020 22  20 - 32 mmol/L Final      Anion Gap 07/08/2020 10  3 - 14 mmol/L Final     Glucose 07/08/2020 162* 70 - 99 mg/dL Final     Urea Nitrogen 07/08/2020 10  7 - 30 mg/dL Final     Creatinine 07/08/2020 0.93  0.52 - 1.04 mg/dL Final     GFR Estimate 07/08/2020 70  >60 mL/min/[1.73_m2] Final    Comment: Non  GFR Calc  Starting 12/18/2018, serum creatinine based estimated GFR (eGFR) will be   calculated using the Chronic Kidney Disease Epidemiology Collaboration   (CKD-EPI) equation.       GFR Estimate If Black 07/08/2020 81  >60 mL/min/[1.73_m2] Final    Comment:  GFR Calc  Starting 12/18/2018, serum creatinine based estimated GFR (eGFR) will be   calculated using the Chronic Kidney Disease Epidemiology Collaboration   (CKD-EPI) equation.       Calcium 07/08/2020 7.9* 8.5 - 10.1 mg/dL Final     Bilirubin Total 07/08/2020 0.6  0.2 - 1.3 mg/dL Final     Albumin 07/08/2020 2.5* 3.4 - 5.0 g/dL Final     Protein Total 07/08/2020 5.8* 6.8 - 8.8 g/dL Final     Alkaline Phosphatase 07/08/2020 198* 40 - 150 U/L Final     ALT 07/08/2020 23  0 - 50 U/L Final     AST 07/08/2020 33  0 - 45 U/L Final     Assessment and Plan:    1. Gastric adenocarcinona, HER-2 negative.   - Started on neoadjuvant FLOT on 3/7/2020. She has required a dose reduction of her oxaliplatin by 20% due to cold sensitivity/neuropathy with cycle #5. 5FU was decreased to 85% with cycle #6 due to mucositis/diarrhea/taste changes.   - Due for cycle #7 tomorrow 7/9/2020, but will hold for diarrhea, dehydration, tachycardia, rash, weight loss- as outlined below. Tentatively reschedule for next week. Will schedule in person visit.  - Plan for 8 cycles with PET and surgery to follow.     2. GI  RUQ/epigastric pain, post-surgical/ gastritis likely contributing: CT imaging with no acute findings. Ok to continue Tramadol PRN. Following with palliative care.  Continue Pantoprazole 40mg BID and Famotidine 20mg BID. Continue Tums and Simethicone prn      Oral cold  sensitivity, H/o mucositis:   - Admitted on 6/15 due to decreased intake. Can use MMW and doxepin mouthwash prn.   - Encouraged continued use of salt/soda swishes as well as biotene for reported dry/thick mucous in mouth.  Also advised to completely avoid cold drinks.      Nausea: Continue Zofran and Zyprexa at bedtime      Diarrhea: worsened after chemo, reduced as above. Reassured that she should continue to use imodium prn as it would help prevent dehydration if it is controlling her diarrhea.  Will also check C diff/stool culture tomorrow given continued diarrhea despite dose reduction of 5FU. Can add Lomotil if stool studies are negative and if needed.     3. Neuro  - Has a history of baseline neuropathy for >10 years. Oxaliplatin previously reduced, as above, for progressive neuropathy. Now stable    4. FEN  - Dehydration, diarrhea and poor PO intake contributing. Holding chemo, as above, and will administer 1 L IVF tomorrow.   - Hypokalemia- diarrhea contributing. On 20 meq PO KCl at home, 2.9 today 7/8/2020. Will give 40 meq IV tomorrow in infusion and have her continue her 20 meq PO at home.   - Weight loss. GI symptoms contributing. Would benefit meeting with dietician again. Talked about ways to optimize GI issues to allow better PO intake.     5. Cards  - Tachycardia,  on vitals. Likely due to dehydration. Will repeat tomorrow at infusion. She is aware to seek medical attention if she notes any new cardiopulmonary symptoms tonight.   - Reported orthostatic hypotension, likely related to dehydration. IVF tomorrow, as above, and will repeat vitals at that time.     6. Derm  - Dry, erythematous rash on wrists, hands, and right foot. Likely toxicity from 5FU. Advised continued moisturizers as this is improving with time.    7. Leukocytosis- she denies fevers/chills and no infectious symptoms aside from diarrhea. May be residual from GCSF. Checking stool studies, as above.       Mckenna Abebe  TWAN  [facility] [address] [id number] [phone]    The patient was seen in conjunction with Mckenna Abebe PA-C who served as a scribe for today's visit. I have reviewed and edited the above note, and agree with the above findings and plan.    Roxana Sweet PA-C  [facility] [address] [address] [phone]

## 2020-07-08 NOTE — TELEPHONE ENCOUNTER
"Pt CO diarrhea and requests an infusion of fluids. Diarrhea began last Thursday with around 7 episodes. Had about 4 loose movements per day Saturday through Tuesday. Today just had first loose movement. Pt hasn't taken imodium today, has been hydrating with water, gatorade, and breakfast beverage, states she feels full from intake, slight nausea no emesis.  No fevers/chills, cough, sore throat, congestion, SOB, edema, vomiting, or abdominal pain.  Paged Roxana Sweet who sees her @ 2:10 today  Per RAHEEL Andres, Pt should get fluids today, if no room in infusion switch visit to in person.  Procured 3 pm appointment in [facility].  Called Pt 2x on cell to inform, no answer.  Reached on home phone. Pt cannot have in-person visit or infusion today as she has sole custody of her kids until 4:30. Asks to infusion tomorrow, states HR is \"ok & fine\". Paged Roxana DEGROOT  OK to add fluids tomorrow. Spoke with Padmini infusion Charge RN, fluids added to existing appointment.   Called Pt's cell, no answer, called home # and left VM stating fluids had been added to infusion and to call with any questions.  "

## 2020-07-08 NOTE — LETTER
7/8/2020         RE: Nathalie Bashir  [address] [facility] [address] [facility] [id number]        Dear Colleague,    Thank you for referring your patient, Nathalie Bashir, to the [facility] CANCER [facility]. Please see a copy of my visit note below.    Nathalie Bashir is a 54 year old female who is being evaluated via a billable video visit.              Video-Visit Details    Type of service:  Video Visit    Video Start Time: 02:28 pm  Video End Time: 03:01 pm    Originating Location (pt. Location): Home    Distant Location (provider location):  [facility]     Platform used for Video Visit: Nestio      Oncology/Hematology Visit Note  Jul 8, 2020    Reason for Visit: Follow up of gastric adenocarcinoma.    History of Present Illness:   Ms. Bashir is a 54 year old female with a history of gastric adenocarcinoma.  She was being followed for a pancreatic duct dilatation and pancreatic cyst which was noted in November 2018.  Since May 2019 she started noticing some nausea and vomiting and occasional abdominal discomfort.  She had an ultrasound in August 2019 which was essentially unremarkable.  She had a repeat endoscopic ultrasound on 10/29/2019 which showed increase in size of the cyst as well as dilation of the main pancreatic duct.  FNA and fluid analysis showed mucinous epithelium.  She was referred to Dr. 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  Surprisingly there was no pancreatic ductal neoplasm seen but on the resected specimen stomach cancer was noted.  It was poorly differentiated adenocarcinoma with poorly cohesive/signet ring cell type with proximal gastric mucosal and serosal margins being positive.  There was lymphovascular and perineural invasion seen.  22 lymph nodes were sampled and all were benign.  It was a pT4aN0 lesion.  HER-2/christine FISH was not amplified.         EUS on 3/3/2020 without clear evidence of neoplasm endoscopically. Left gastric lymph node  fine-needle aspiration was negative for carcinoma. Biopsy from the gastric jejunal anastomosis as well as lesser curvature of the stomach also did not show any malignancy.     She had a PET/CT on 3/13/2020 which showed soft tissue streaky density with increased FDG uptake adjacent to the pancreaticojejunostomy which could be neoplastic in origin.  There is mild increased FDG uptake in the gastric remnant.  Focal area of soft tissue thickening with fatty streakiness along medial laparotomy with increased FDG uptake which likely is inflammatory. Increased FDG uptake in thoracic esophagus which could be esophagitis.      She started on chemotherapy with 5FU, oxaliplatin, and Taxotere on 3/27/20. She was seen on 4/7/20 for evaluation of fevers. She was treated for possible pneumonia with Levaquin.      She was seen in clinic 4/13/20 for RUQ pain,CT and labs reassuring. She received cycles 2 and 3 of chemotherapy on 4/20/20 and 5/4/20. She was admitted from 5/21-5/23/20 with neutropenic aspiration pneumonia. She received cycle 4 on 5/27/20 with Neulasta support. PET/CT on 6/3/20 showed mild residual uptake at the site of gastrojejunal anastomosis which likely is physiologic.  There is almost resolution of soft tissue nodule next to the pancreaticoduodenal anastomosis without FDG uptake.  There is focal increased uptake in the left posterior acetabulum which could be artifact.     Decreased dose of oxaliplatin due to neuropathy/cold sensitivity on 6/11. Decreased 5FU to 85% with cycle #6 due to mucositis/diarrhea/taste changes.     Interval History:    Nathalie was met with via video today. She has had a rough few weeks.    Diarrhea developed on Thursday/Friday of last week. She was having 4 episodes despite taking imodium- she does believe she took 8 of the imodium on her high diarrhea day. She was then fearful that the imodium was making her more dehydrated so stopped taking it. Thankfully the diarrhea then improved on  its own. She had a few days without diarrhea and only had 1 episode today. Abdominal pain is stable. No blood in stool. Denies fevers or chills. Nausea is controlled with zofran.    She is noting worsening dizziness, particularly when changing positions. Denies changes in vision or new headache. Fluid intake has been limited as cold fluids irritate her mouth and she was having thick mucous in her mouth that made swallowing hard. She was trying salt/soda rinses but thought it might be drying out her mouth. Not consistently using biotene. The mucous is improved today. Aside from with cold, her mouth does not hurt and she has not noted a white coating in mouth. This has also reduced her food intake but she is going to try to eat more now that the mouth feels better.   - Mentioned that she developed a dry, red, painful rash to bilateral wrists and right foot after last treatment. She has been using lotion with slow improvement.  - Neuropathy in her feet is stable. Still able to walk without difficulty.   - It was noted that her heart rate was fast on VS today. She states she tends to have a fast heart rate when she is moving around chronically. Denies any chest pain or shortness of breath with this.   - She otherwise denies any swelling, bleeding, or urinary symptoms.       Current Outpatient Medications   Medication Sig Dispense Refill     amitriptyline (ELAVIL) 25 MG tablet Take 3 tablets by mouth At Bedtime       benzocaine-menthol (CEPACOL) 15-3.6 MG lozenge Place 1 lozenge inside cheek every hour as needed for sore throat 15 lozenge 1     calcium carb 1250 mg, 500 mg [place],/vitamin D 200 units (OSCAL WITH D) 500-200 MG-UNIT per tablet Take 1 tablet by mouth 3 times daily (with meals)       citalopram (CELEXA) 20 MG tablet Take 20 mg by mouth every morning        dexamethasone (DECADRON) 4 MG tablet Take 1 tablet (4mg) the day before, day of, and 2 days after chemotherapy 12 tablet 3     diclofenac (FLECTOR) 1.3 %  patch Place 1 patch onto the skin daily as needed        doxepin (SINEQUAN) 10 MG/ML (HIGH CONC) solution Take 1 mL (10 mg) by mouth 2 times daily 15 mL 1     famotidine (PEPCID) 20 MG tablet Take 1 tablet (20 mg) by mouth 2 times daily 60 tablet 3     fluticasone (FLONASE) 50 MCG/ACT nasal spray Spray 2 sprays into both nostrils daily as needed        hydrOXYzine (ATARAX) 10 MG tablet Take 10 mg by mouth 3 times daily as needed for itching       LORazepam (ATIVAN) 0.5 MG tablet Take 1 tablet (0.5 mg) by mouth every 4 hours as needed (Anxiety, Nausea/Vomiting or Sleep) 30 tablet 5     magic mouthwash suspension, diphenhydrAMINE, lidocaine, aluminum-magnesium & simethicone, (FIRST-MOUTHWASH BLM) compounding kit Swish and spit 10 mLs in mouth every 6 hours as needed for mouth sores (Patient not taking: Reported on 7/9/2020) 1 Bottle 1     meclizine (ANTIVERT) 25 MG tablet Take 1 tablet (25 mg) by mouth 3 times daily as needed for dizziness 60 tablet 0     multivitamin, therapeutic with minerals (MULTI-VITAMIN) TABS Take 1 tablet by mouth daily       naloxone (NARCAN) 4 MG/0.1ML nasal spray Spray 1 spray (4 mg) into one nostril alternating nostrils once as needed for opioid reversal every 2-3 minutes until assistance arrives (Patient not taking: Reported on 7/9/2020) 0.2 mL 0     OLANZapine zydis (ZYPREXA) 5 MG ODT Take 1 tablet (5 mg) by mouth At Bedtime 30 tablet 0     ondansetron (ZOFRAN) 8 MG tablet Take 1 tablet (8 mg) by mouth every 8 hours as needed (Nausea/Vomiting) 90 tablet 3     ondansetron (ZOFRAN-ODT) 4 MG ODT tab Take 1 tablet (4 mg) by mouth every 6 hours as needed for nausea or vomiting 90 tablet 3     pantoprazole (PROTONIX) 40 MG EC tablet TAKE ONE TABLET BY MOUTH TWICE A DAY 60 tablet 1     potassium chloride ER (KLOR-CON M) 20 MEQ CR tablet Take 1 tablet (20 mEq) by mouth daily 30 tablet 1     senna-docusate (SENOKOT-S/PERICOLACE) 8.6-50 MG tablet Take 1 tablet by mouth daily as needed for  "constipation (Patient not taking: Reported on 7/9/2020) 30 tablet 0     simethicone 80 MG TABS Take 1 tablet by mouth every 6 hours as needed (bloating, gas, belching) 90 tablet 3     SUMAtriptan (IMITREX) 100 MG tablet Take 100 mg by mouth as needed       tiZANidine (ZANAFLEX) 4 MG tablet Take 1 tablet by mouth 2 times daily as needed       loperamide (IMODIUM) 2 MG capsule Take 2 mg by mouth 4 times daily as needed for diarrhea         Physical Examination:  /68 (BP Location: Right arm)   Pulse 120   Temp 97.5  F (36.4  C) (Oral)   Resp 16   Ht 1.753 m (5' 9.02\")   Wt 75.7 kg (166 lb 14.4 oz)   LMP 09/23/2014   SpO2 100%   BMI 24.64 kg/m    Wt Readings from Last 10 Encounters:   07/08/20 75.7 kg (166 lb 14.4 oz)   07/08/20 75.7 kg (166 lb 14.4 oz)   06/25/20 79 kg (174 lb 1.6 oz)   06/24/20 78.9 kg (174 lb)   06/24/20 79 kg (174 lb 1.6 oz)   06/17/20 81 kg (178 lb 9.6 oz)   06/10/20 79.7 kg (175 lb 12.8 oz)   06/10/20 81.6 kg (180 lb)   05/27/20 83.6 kg (184 lb 3.2 oz)   05/23/20 83.7 kg (184 lb 9.6 oz)     GENERAL: Healthy, alert and no distress  EYES: Eyes grossly normal to inspection.  No discharge or erythema, or obvious scleral/conjunctival abnormalities.  RESP: No audible wheeze, cough, or visible cyanosis.  No visible retractions or increased work of breathing.    SKIN: Visible skin clear. Dry, mildly erythematous skin noted on wrists/hands and right foot. Full assessment of skin was limited by video.   NEURO: Cranial nerves grossly intact.  Mentation and speech appropriate for age.  PSYCH: Mentation appears normal, affect normal/bright, judgement and insight intact, normal speech and appearance well-groomed.    Laboratory Data:  Orders Only on 07/08/2020   Component Date Value Ref Range Status     WBC 07/08/2020 16.5* 4.0 - 11.0 10e9/L Final     RBC Count 07/08/2020 4.11  3.8 - 5.2 10e12/L Final     Hemoglobin 07/08/2020 10.8* 11.7 - 15.7 g/dL Final     Hematocrit 07/08/2020 34.6* 35.0 - 47.0 " % Final     MCV 07/08/2020 84  78 - 100 fl Final     MCH 07/08/2020 26.3* 26.5 - 33.0 pg Final     MCHC 07/08/2020 31.2* 31.5 - 36.5 g/dL Final     RDW 07/08/2020 20.0* 10.0 - 15.0 % Final     Platelet Count 07/08/2020 218  150 - 450 10e9/L Final     Diff Method 07/08/2020 Automated Method   Final     % Neutrophils 07/08/2020 82.3  % Final     % Lymphocytes 07/08/2020 8.5  % Final     % Monocytes 07/08/2020 6.8  % Final     % Eosinophils 07/08/2020 0.1  % Final     % Basophils 07/08/2020 0.4  % Final     % Immature Granulocytes 07/08/2020 1.9  % Final     Nucleated RBCs 07/08/2020 0  0 /100 Final     Absolute Neutrophil 07/08/2020 13.6* 1.6 - 8.3 10e9/L Final     Absolute Lymphocytes 07/08/2020 1.4  0.8 - 5.3 10e9/L Final     Absolute Monocytes 07/08/2020 1.1  0.0 - 1.3 10e9/L Final     Absolute Eosinophils 07/08/2020 0.0  0.0 - 0.7 10e9/L Final     Absolute Basophils 07/08/2020 0.1  0.0 - 0.2 10e9/L Final     Abs Immature Granulocytes 07/08/2020 0.3  0 - 0.4 10e9/L Final     Absolute Nucleated RBC 07/08/2020 0.0   Final     Sodium 07/08/2020 135  133 - 144 mmol/L Final     Potassium 07/08/2020 2.9* 3.4 - 5.3 mmol/L Final     Chloride 07/08/2020 103  94 - 109 mmol/L Final     Carbon Dioxide 07/08/2020 22  20 - 32 mmol/L Final     Anion Gap 07/08/2020 10  3 - 14 mmol/L Final     Glucose 07/08/2020 162* 70 - 99 mg/dL Final     Urea Nitrogen 07/08/2020 10  7 - 30 mg/dL Final     Creatinine 07/08/2020 0.93  0.52 - 1.04 mg/dL Final     GFR Estimate 07/08/2020 70  >60 mL/min/[1.73_m2] Final    Comment: Non  GFR Calc  Starting 12/18/2018, serum creatinine based estimated GFR (eGFR) will be   calculated using the Chronic Kidney Disease Epidemiology Collaboration   (CKD-EPI) equation.       GFR Estimate If Black 07/08/2020 81  >60 mL/min/[1.73_m2] Final    Comment:  GFR Calc  Starting 12/18/2018, serum creatinine based estimated GFR (eGFR) will be   calculated using the Chronic Kidney Disease  Epidemiology Collaboration   (CKD-EPI) equation.       Calcium 07/08/2020 7.9* 8.5 - 10.1 mg/dL Final     Bilirubin Total 07/08/2020 0.6  0.2 - 1.3 mg/dL Final     Albumin 07/08/2020 2.5* 3.4 - 5.0 g/dL Final     Protein Total 07/08/2020 5.8* 6.8 - 8.8 g/dL Final     Alkaline Phosphatase 07/08/2020 198* 40 - 150 U/L Final     ALT 07/08/2020 23  0 - 50 U/L Final     AST 07/08/2020 33  0 - 45 U/L Final     Assessment and Plan:    1. Gastric adenocarcinona, HER-2 negative.   - Started on neoadjuvant FLOT on 3/7/2020. She has required a dose reduction of her oxaliplatin by 20% due to cold sensitivity/neuropathy with cycle #5. 5FU was decreased to 85% with cycle #6 due to mucositis/diarrhea/taste changes.   - Due for cycle #7 tomorrow 7/9/2020, but will hold for diarrhea, dehydration, tachycardia, rash, weight loss- as outlined below. Tentatively reschedule for next week. Will schedule in person visit.  - Plan for 8 cycles with PET and surgery to follow.     2. GI  RUQ/epigastric pain, post-surgical/ gastritis likely contributing: CT imaging with no acute findings. Ok to continue Tramadol PRN. Following with palliative care.  Continue Pantoprazole 40mg BID and Famotidine 20mg BID. Continue Tums and Simethicone prn      Oral cold sensitivity, H/o mucositis:   - Admitted on 6/15 due to decreased intake. Can use MMW and doxepin mouthwash prn.   - Encouraged continued use of salt/soda swishes as well as biotene for reported dry/thick mucous in mouth.  Also advised to completely avoid cold drinks.      Nausea: Continue Zofran and Zyprexa at bedtime      Diarrhea: worsened after chemo, reduced as above. Reassured that she should continue to use imodium prn as it would help prevent dehydration if it is controlling her diarrhea.  Will also check C diff/stool culture tomorrow given continued diarrhea despite dose reduction of 5FU. Can add Lomotil if stool studies are negative and if needed.     3. Neuro  - Has a history of  baseline neuropathy for >10 years. Oxaliplatin previously reduced, as above, for progressive neuropathy. Now stable    4. FEN  - Dehydration, diarrhea and poor PO intake contributing. Holding chemo, as above, and will administer 1 L IVF tomorrow.   - Hypokalemia- diarrhea contributing. On 20 meq PO KCl at home, 2.9 today 7/8/2020. Will give 40 meq IV tomorrow in infusion and have her continue her 20 meq PO at home.   - Weight loss. GI symptoms contributing. Would benefit meeting with dietician again. Talked about ways to optimize GI issues to allow better PO intake.     5. Cards  - Tachycardia,  on vitals. Likely due to dehydration. Will repeat tomorrow at infusion. She is aware to seek medical attention if she notes any new cardiopulmonary symptoms tonight.   - Reported orthostatic hypotension, likely related to dehydration. IVF tomorrow, as above, and will repeat vitals at that time.     6. Derm  - Dry, erythematous rash on wrists, hands, and right foot. Likely toxicity from 5FU. Advised continued moisturizers as this is improving with time.    7. Leukocytosis- she denies fevers/chills and no infectious symptoms aside from diarrhea. May be residual from GCSF. Checking stool studies, as above.       Mckenna Abebe PA-C  [facility] [place] [address]  [phone]    The patient was seen in conjunction with Mckenna Abebe PA-C who served as a scribe for today's visit. I have reviewed and edited the above note, and agree with the above findings and plan.    Roxana Sweet PA-C  [facility] [place] [address]  [phone]        Again, thank you for allowing me to participate in the care of your patient.        Sincerely,        Roxana Sweet PA-C

## 2020-07-09 ENCOUNTER — INFUSION THERAPY VISIT (OUTPATIENT)
Dept: ONCOLOGY | Facility: CLINIC | Age: 55
End: 2020-07-09
Attending: INTERNAL MEDICINE
Payer: MEDICARE

## 2020-07-09 ENCOUNTER — PATIENT OUTREACH (OUTPATIENT)
Dept: PALLIATIVE CARE | Facility: CLINIC | Age: 55
End: 2020-07-09

## 2020-07-09 VITALS
SYSTOLIC BLOOD PRESSURE: 91 MMHG | OXYGEN SATURATION: 99 % | TEMPERATURE: 98 F | DIASTOLIC BLOOD PRESSURE: 64 MMHG | RESPIRATION RATE: 16 BRPM | HEART RATE: 110 BPM

## 2020-07-09 DIAGNOSIS — C16.9 GASTRIC ADENOCARCINOMA (H): Primary | ICD-10-CM

## 2020-07-09 DIAGNOSIS — C16.9 GASTRIC ADENOCARCINOMA (H): ICD-10-CM

## 2020-07-09 DIAGNOSIS — D49.0 IPMN (INTRADUCTAL PAPILLARY MUCINOUS NEOPLASM): ICD-10-CM

## 2020-07-09 DIAGNOSIS — R19.7 DIARRHEA, UNSPECIFIED TYPE: ICD-10-CM

## 2020-07-09 LAB
C COLI+JEJUNI+LARI FUSA STL QL NAA+PROBE: NOT DETECTED
C DIFF TOX B STL QL: NEGATIVE
EC STX1 GENE STL QL NAA+PROBE: NOT DETECTED
EC STX2 GENE STL QL NAA+PROBE: NOT DETECTED
ENTERIC PATHOGEN COMMENT: NORMAL
NOROV GI+II ORF1-ORF2 JNC STL QL NAA+PR: NOT DETECTED
RVA NSP5 STL QL NAA+PROBE: NOT DETECTED
SALMONELLA SP RPOD STL QL NAA+PROBE: NOT DETECTED
SHIGELLA SP+EIEC IPAH STL QL NAA+PROBE: NOT DETECTED
SPECIMEN SOURCE: NORMAL
V CHOL+PARA RFBL+TRKH+TNAA STL QL NAA+PR: NOT DETECTED
Y ENTERO RECN STL QL NAA+PROBE: NOT DETECTED

## 2020-07-09 PROCEDURE — 25000128 H RX IP 250 OP 636: Mod: ZF | Performed by: PHYSICIAN ASSISTANT

## 2020-07-09 PROCEDURE — 25800030 ZZH RX IP 258 OP 636: Mod: ZF | Performed by: PHYSICIAN ASSISTANT

## 2020-07-09 PROCEDURE — 25000128 H RX IP 250 OP 636: Mod: ZF | Performed by: INTERNAL MEDICINE

## 2020-07-09 PROCEDURE — 96366 THER/PROPH/DIAG IV INF ADDON: CPT

## 2020-07-09 PROCEDURE — 87506 IADNA-DNA/RNA PROBE TQ 6-11: CPT | Performed by: PHYSICIAN ASSISTANT

## 2020-07-09 PROCEDURE — 87493 C DIFF AMPLIFIED PROBE: CPT | Mod: XU | Performed by: PHYSICIAN ASSISTANT

## 2020-07-09 PROCEDURE — 96365 THER/PROPH/DIAG IV INF INIT: CPT

## 2020-07-09 RX ORDER — LOPERAMIDE HCL 2 MG
4 CAPSULE ORAL 4 TIMES DAILY PRN
COMMUNITY

## 2020-07-09 RX ORDER — ONDANSETRON 2 MG/ML
8 INJECTION INTRAMUSCULAR; INTRAVENOUS EVERY 8 HOURS PRN
Status: CANCELLED
Start: 2020-07-09

## 2020-07-09 RX ORDER — HEPARIN SODIUM (PORCINE) LOCK FLUSH IV SOLN 100 UNIT/ML 100 UNIT/ML
500 SOLUTION INTRAVENOUS ONCE
Status: COMPLETED | OUTPATIENT
Start: 2020-07-09 | End: 2020-07-09

## 2020-07-09 RX ORDER — OXYCODONE HYDROCHLORIDE 5 MG/1
5 TABLET ORAL 3 TIMES DAILY PRN
Qty: 45 TABLET | Refills: 0 | Status: ON HOLD | OUTPATIENT
Start: 2020-07-09 | End: 2020-07-29

## 2020-07-09 RX ADMIN — Medication 500 UNITS: at 13:06

## 2020-07-09 RX ADMIN — SODIUM CHLORIDE 500 ML: 9 INJECTION, SOLUTION INTRAVENOUS at 12:16

## 2020-07-09 RX ADMIN — POTASSIUM CHLORIDE: 149 INJECTION, SOLUTION, CONCENTRATE INTRAVENOUS at 09:06

## 2020-07-09 ASSESSMENT — PAIN SCALES - GENERAL: PAINLEVEL: MODERATE PAIN (4)

## 2020-07-09 NOTE — PROGRESS NOTES
Received message from ONC PA patient requesting a refill of pain medication. ONC PA indicated patient initially asked for tramadol refill, but PA realized oxycodone was most recent med sent. Patient told PA oxycodone worked better then tramadol and that she had been taking it a few times per day for continued throat discomfort and abdominal pain.     Called patient to follow up on request. She tells me that oxycodone is better then tramadol for her pain. She reports she has been taking it TID before meals usually. Does think it helps before eating, she is able to eat more now. No side effects, still having regular BMs. She also confirms she has not taken any tramadol since starting oxycodone.    Advised I would request refill from MD to her local pharmacy.    Last refill: 6/30/2020 (only #20 tabs)  Last office visit: 6/10/2020  Scheduled for follow up 8/18/2020    [place]  Report reviewed.

## 2020-07-09 NOTE — PROGRESS NOTES
Infusion Nursing Note:  Nathalie Bashir presents today for IV fluids and potassium replacement.    Patient seen by provider today: Yes: Mckenna PICKERING    Treatment Conditions:  Lab Results   Component Value Date    HGB 10.8 07/08/2020     Lab Results   Component Value Date    WBC 16.5 07/08/2020      Lab Results   Component Value Date    ANEU 13.6 07/08/2020     Lab Results   Component Value Date     07/08/2020      Lab Results   Component Value Date     07/08/2020                   Lab Results   Component Value Date    POTASSIUM 2.9 07/08/2020           Lab Results   Component Value Date    MAG 1.6 07/08/2020            Lab Results   Component Value Date    CR 0.93 07/08/2020                   Lab Results   Component Value Date    PETRA 7.9 07/08/2020                Lab Results   Component Value Date    BILITOTAL 0.6 07/08/2020           Lab Results   Component Value Date    ALBUMIN 2.5 07/08/2020                    Lab Results   Component Value Date    ALT 23 07/08/2020           Lab Results   Component Value Date    AST 33 07/08/2020         Pain: abdomen 4/10, chronic pain. Takes ultram at home for pain.    Note:   Per provider visit yesterday, chemotherapy is delayed x 1 week. Pt reports ongoing diarrhea since last week.  Reported 3 loose stools yesterday.  Stool culture sent for C.Diff today:  negative.      Pre-1000 ml NS IV fluid orthostatics:    Sitting  BP: 103/58  HR: 110.     Standing BP: 93/61  HR:125    Post-1000 ml NS IV fluid orthostatics:  Sitting BP: 96/67  HR: 102  Standing BP: 83/56  HR: 120    7/9/20 at 1200 TORB Mckenna PICKERING / Brisa Hess RN:  Give an additional 500 ml NS IV  Recheck orthostatics after fluids again.       1300  Post-additional 500 ml NS IV fluid orthostatics:  Sitting BP: 94/61  HR: 101  Standing BP: 93/63  HR: 116    7/9/20 at 1315: TORB Mckenna PICKERING / Brisa Hess RN:  OK to discharge home today.  Pt should call with worsening symptoms.      Potassium 2.9 on  7/8/20.  Replaced with 40 meq KCL in IV fluids.  Pt also taking Potassium 20 meq daily at home.     Intravenous Access:  Implanted Port.    Post Infusion Assessment:  Patient tolerated infusion without incident.  Blood return noted pre and post infusion.  Access discontinued per protocol.    Discharge Plan:   Patient declined prescription refills.  Discharge instructions reviewed with: Patient.  Patient and/or family verbalized understanding of discharge instructions and all questions answered.  Copy of AVS reviewed with patient and/or family.  Patient will return 7/15/20 for next appointment.  Patient discharged in stable condition accompanied by: self.  Departure Mode: Ambulatory.  Face to Face time: 10 min.    Brisa Hess RN

## 2020-07-09 NOTE — PATIENT INSTRUCTIONS
[facility] Triage and after hours / weekends / holidays:  [phone]    Please call the triage or after hours line if you experience a temperature greater than or equal to 100.5, shaking chills, have uncontrolled nausea, vomiting and/or diarrhea, dizziness, shortness of breath, chest pain, bleeding, unexplained bruising, or if you have any other new/concerning symptoms, questions or concerns.      If you are having any concerning symptoms or wish to speak to a provider before your next infusion visit, please call your care coordinator or triage to notify them so we can adequately serve you.     If you need a refill on a narcotic prescription or other medication, please call before your infusion appointment.           July 2020 Sunday Monday Tuesday Wednesday Thursday Friday Saturday                  1     2    [facility] SPEC INFUSION 60   1:00 PM   (60 min.)    SPEC INFUSION   [facility] Specialty and Procedure 3     4       5     6     7     8    [facility] LAB DRAW  10:30 AM   (15 min.)   [facility] Lab Draw    VIDEO VISIT RETURN   1:55 PM   (50 min.)   Roxana Sweet PA-C [facility] 9    [facility] ONC INFUSION 360   8:30 AM   (360 min.)    ONCOLOGY INFUSION   [facility] 10     11       12     13     14     15    [facility] LAB DRAW   9:00 AM   (15 min.)    [facility]    P RETURN   9:15 AM   (50 min.)   Roxana Sweet PA-C [facility] ONC INFUSION 360  10:30 AM   (360 min.)    ONCOLOGY INFUSION   [facility] 16     17     18       19     20     21     22     23     24     25       26     27     28    [facility] LAB DRAW   8:30 AM   (15 min.)   [facility] DRAW   [facility]    VIDEO VISIT RETURN  10:35 AM   (50 min.)   Julienne Eden PA-C   [facility] 29    [facility] ONC  INFUSION 360  10:00 AM   (360 min.)   [facility] ONCOLOGY INFUSION   [facility] 30 31 August 2020 Sunday Monday Tuesday Wednesday Thursday Friday Saturday                                 1       2     3     4     5     6     7     8       9     10     11     12     13     14     15       16     17     18    VIDEO VISIT RETURN   2:55 PM   (40 min.)   Myranda Peter MD   [facility] 19     20     21     22       23     24     25     26     27     28     29       30     31                                             Recent Results (from the past 24 hour(s))   Clostridium difficile toxin B PCR    Collection Time: 07/09/20  9:15 AM    Specimen: Feces   Result Value Ref Range    Specimen Description Feces     C Diff Toxin B PCR Negative NEG^Negative

## 2020-07-14 ENCOUNTER — PATIENT OUTREACH (OUTPATIENT)
Dept: ONCOLOGY | Facility: CLINIC | Age: 55
End: 2020-07-14

## 2020-07-14 NOTE — PROGRESS NOTES
"Called Nathalie to check on how she is feeling this week.    She reports she is feeling better this week, she is eating and drinking better, mucositis is improving ad diarrhea has resolved. She feels the hydration last week was very helpful.  She feels she gets \"dry\" after chemo and cannot keep on her fluids due to the cold sensitivity. She would like to be scheduled in  advance for IVF after her next cycle of chemo. Writer agrees this would be helpful, encouraged her to discuss with Roxana Sweet PA-C tomorrow.   Encouraged her to call clinic as soon as she develops any issues in order to keep from getting as sick this cycle.   Message routed to Roxana Sweet. TWAN.      "

## 2020-07-14 NOTE — PROGRESS NOTES
Oncology/Hematology Visit Note  Jul 15, 2020    Reason for Visit: Follow up of gastric adenocarcinoma.    History of Present Illness:   Ms. Bashir is a 54 year old female with a history of gastric adenocarcinoma.  She was being followed for a pancreatic duct dilatation and pancreatic cyst which was noted in November 2018.  Since May 2019 she started noticing some nausea and vomiting and occasional abdominal discomfort.  She had an ultrasound in August 2019 which was essentially unremarkable.  She had a repeat endoscopic ultrasound on 10/29/2019 which showed increase in size of the cyst as well as dilation of the main pancreatic duct.  FNA and fluid analysis showed mucinous epithelium.  She was referred to Dr. 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  Surprisingly there was no pancreatic ductal neoplasm seen but on the resected specimen stomach cancer was noted.  It was poorly differentiated adenocarcinoma with poorly cohesive/signet ring cell type with proximal gastric mucosal and serosal margins being positive.  There was lymphovascular and perineural invasion seen.  22 lymph nodes were sampled and all were benign.  It was a pT4aN0 lesion.  HER-2/christine FISH was not amplified.         EUS on 3/3/2020 without clear evidence of neoplasm endoscopically. Left gastric lymph node fine-needle aspiration was negative for carcinoma. Biopsy from the gastric jejunal anastomosis as well as lesser curvature of the stomach also did not show any malignancy.     She had a PET/CT on 3/13/2020 which showed soft tissue streaky density with increased FDG uptake adjacent to the pancreaticojejunostomy which could be neoplastic in origin.  There is mild increased FDG uptake in the gastric remnant.  Focal area of soft tissue thickening with fatty streakiness along medial laparotomy with increased FDG uptake which likely is inflammatory. Increased FDG uptake in thoracic esophagus which could be  esophagitis.      She started on chemotherapy with 5FU, oxaliplatin, and Taxotere on 3/27/20. She was seen on 4/7/20 for evaluation of fevers. She was treated for possible pneumonia with Levaquin.      She was seen in clinic 4/13/20 for RUQ pain,CT and labs reassuring. She received cycles 2 and 3 of chemotherapy on 4/20/20 and 5/4/20. She was admitted from 5/21-5/23/20 with neutropenic aspiration pneumonia. She received cycle 4 on 5/27/20 with Neulasta support. PET/CT on 6/3/20 showed mild residual uptake at the site of gastrojejunal anastomosis which likely is physiologic.  There is almost resolution of soft tissue nodule next to the pancreaticoduodenal anastomosis without FDG uptake.  There is focal increased uptake in the left posterior acetabulum which could be artifact.     Decreased dose of oxaliplatin due to neuropathy/cold sensitivity on 6/11. Decreased 5FU to 85% with cycle #6 due to mucositis/diarrhea/taste changes. Treatment was held 7/9/20 due to diarrhea, dehydration, tachycardia, rash, and weight loss. She is here today for routine follow-up prior to consideration of delayed cycle 7 5FU, oxaliplatin, and Taxotere.    Interval History:  Patient reports that she has been eating and drinking better this week.  She does note that she has constant neuropathy in her toes and feet as well as her fingertips that is occasionally painful.  She does not feel like she is tripping over her feet, but she does have difficulty opening things and some difficulty with writing.  She continues to have constant central abdominal pain that she rates anywhere from a 3-6 out of 10.  It improves somewhat with lying on her right side.  She has had some ongoing nausea managed with Zofran and she has continued to take the Zyprexa.  She denies any vomiting in the last week.  She reports having twice daily formed bowel movements lately.  She reports her energy is fair and she has gone on some occasional short walks.  She notes that  she had a couple of nights where she was waking up every couple of hours due to acid despite taking her regular medicines for acid reflux.  She notes this has improved over the last 2 days.  She has had more difficulty with hemorrhoids recently with some associated bleeding.  She has been applying Preparation H and feels this is improving.  She denies any mouth sores currently.  She has ongoing cold sensitivity, but feels it is less intense now than it was right after her last cycle of chemotherapy. The skin on her hands and feet remains dry. She denies other concerns.    Review of Systems:  Patient denies any of the following except if noted above: fevers, chills, vision or hearing changes, chest pain, dyspnea, vomiting, diarrhea, constipation, urinary concerns, headaches, or issues with mood. She denies other bleeding issues.     Current Outpatient Medications   Medication Sig Dispense Refill     amitriptyline (ELAVIL) 25 MG tablet Take 3 tablets by mouth At Bedtime       benzocaine-menthol (CEPACOL) 15-3.6 MG lozenge Place 1 lozenge inside cheek every hour as needed for sore throat 15 lozenge 1     calcium carb 1250 mg, 500 mg [place],/vitamin D 200 units (OSCAL WITH D) 500-200 MG-UNIT per tablet Take 1 tablet by mouth 3 times daily (with meals)       citalopram (CELEXA) 20 MG tablet Take 20 mg by mouth every morning        dexamethasone (DECADRON) 4 MG tablet Take 1 tablet (4mg) the day before, day of, and 2 days after chemotherapy 12 tablet 3     diclofenac (FLECTOR) 1.3 % patch Place 1 patch onto the skin daily as needed        doxepin (SINEQUAN) 10 MG/ML (HIGH CONC) solution Take 1 mL (10 mg) by mouth 2 times daily 15 mL 1     famotidine (PEPCID) 20 MG tablet Take 1 tablet (20 mg) by mouth 2 times daily 60 tablet 3     fluticasone (FLONASE) 50 MCG/ACT nasal spray Spray 2 sprays into both nostrils daily as needed        hydrOXYzine (ATARAX) 10 MG tablet Take 10 mg by mouth 3 times daily as needed for itching        loperamide (IMODIUM) 2 MG capsule Take 2 mg by mouth 4 times daily as needed for diarrhea       LORazepam (ATIVAN) 0.5 MG tablet Take 1 tablet (0.5 mg) by mouth every 4 hours as needed (Anxiety, Nausea/Vomiting or Sleep) 30 tablet 5     magic mouthwash suspension, diphenhydrAMINE, lidocaine, aluminum-magnesium & simethicone, (FIRST-MOUTHWASH BLM) compounding kit Swish and spit 10 mLs in mouth every 6 hours as needed for mouth sores (Patient not taking: Reported on 7/9/2020) 1 Bottle 1     meclizine (ANTIVERT) 25 MG tablet Take 1 tablet (25 mg) by mouth 3 times daily as needed for dizziness 60 tablet 0     multivitamin, therapeutic with minerals (MULTI-VITAMIN) TABS Take 1 tablet by mouth daily       naloxone (NARCAN) 4 MG/0.1ML nasal spray Spray 1 spray (4 mg) into one nostril alternating nostrils once as needed for opioid reversal every 2-3 minutes until assistance arrives (Patient not taking: Reported on 7/9/2020) 0.2 mL 0     OLANZapine zydis (ZYPREXA) 5 MG ODT Take 1 tablet (5 mg) by mouth At Bedtime 30 tablet 0     ondansetron (ZOFRAN) 8 MG tablet Take 1 tablet (8 mg) by mouth every 8 hours as needed (Nausea/Vomiting) 90 tablet 3     ondansetron (ZOFRAN-ODT) 4 MG ODT tab Take 1 tablet (4 mg) by mouth every 6 hours as needed for nausea or vomiting 90 tablet 3     oxyCODONE (ROXICODONE) 5 MG tablet Take 1 tablet (5 mg) by mouth 3 times daily as needed for pain (. Stop tramadol. Cannot take with tramadol.) 45 tablet 0     pantoprazole (PROTONIX) 40 MG EC tablet TAKE ONE TABLET BY MOUTH TWICE A DAY 60 tablet 1     potassium chloride ER (KLOR-CON M) 20 MEQ CR tablet Take 1 tablet (20 mEq) by mouth daily 30 tablet 1     senna-docusate (SENOKOT-S/PERICOLACE) 8.6-50 MG tablet Take 1 tablet by mouth daily as needed for constipation (Patient not taking: Reported on 7/9/2020) 30 tablet 0     simethicone 80 MG TABS Take 1 tablet by mouth every 6 hours as needed (bloating, gas, belching) 90 tablet 3     SUMAtriptan  (IMITREX) 100 MG tablet Take 100 mg by mouth as needed       tiZANidine (ZANAFLEX) 4 MG tablet Take 1 tablet by mouth 2 times daily as needed         Physical Examination:  General: The patient is a pleasant female in no acute distress. Wearing a head covering.  /63 (BP Location: Right arm, Patient Position: Sitting, Cuff Size: Adult Regular)   Pulse 103   Temp 98.1  F (36.7  C) (Oral)   Resp 16   Wt 79.4 kg (175 lb)   LMP 09/23/2014   SpO2 98%   BMI 25.83 kg/m    Wt Readings from Last 10 Encounters:   07/15/20 79.4 kg (175 lb)   07/08/20 75.7 kg (166 lb 14.4 oz)   07/08/20 75.7 kg (166 lb 14.4 oz)   06/25/20 79 kg (174 lb 1.6 oz)   06/24/20 78.9 kg (174 lb)   06/24/20 79 kg (174 lb 1.6 oz)   06/17/20 81 kg (178 lb 9.6 oz)   06/10/20 79.7 kg (175 lb 12.8 oz)   06/10/20 81.6 kg (180 lb)   05/27/20 83.6 kg (184 lb 3.2 oz)   HEENT: EOMI. Sclerae are anicteric.   Lungs: Breathing is not labored.    Rectum: External non-bleeding hemorrhoids noted.   Extremities: No lower extremity edema noted bilaterally.   Neuro: Cranial nerves II through XII are grossly intact.  Skin: Skin on hands and feet is moderately dry with some cracking. Excoriated lesions noted on arms and ankles.     Laboratory Data:   7/15/2020 09:32   Sodium 139   Potassium 3.8   Chloride 108   Carbon Dioxide 24   Urea Nitrogen 10   Creatinine 0.84   GFR Estimate 79   GFR Estimate If Black >90   Calcium 7.4 (L)   Anion Gap 6   Albumin 2.0 (L)   Protein Total 5.2 (L)   Bilirubin Total 0.4   Alkaline Phosphatase 187 (H)   ALT 29   AST 46 (H)   Glucose 92   WBC 9.8   Hemoglobin 9.6 (L)   Hematocrit 31.2 (L)   Platelet Count 345   RBC Count 3.59 (L)   MCV 87   MCH 26.7   MCHC 30.8 (L)   RDW 22.3 (H)   Diff Method Automated Method   % Neutrophils 78.7   % Lymphocytes 15.1   % Monocytes 4.7   % Eosinophils 0.4   % Basophils 0.4   % Immature Granulocytes 0.7   Nucleated RBCs 0   Absolute Neutrophil 7.7   Absolute Lymphocytes 1.5   Absolute Monocytes  0.5   Absolute Eosinophils 0.0   Absolute Basophils 0.0   Abs Immature Granulocytes 0.1   Absolute Nucleated RBC 0.0     Assessment and Plan:    1. Gastric adenocarcinona, HER-2 negative.   - Started on neoadjuvant FLOT on 3/7/2020. She has required a dose reduction of her oxaliplatin by 20% due to cold sensitivity/neuropathy with cycle #5. 5FU was decreased to 85% with cycle #6 due to mucositis/diarrhea/taste changes.   - Cycle 7 was held last week due to diarrhea, dehydration, tachycardia, rash, and weight loss. She is doing much better now, but her neuropathy has worsened. Will continue with cycle 7 today with decreasing oxaliplatin to 70% dosing.   - Plan for 8 cycles with PET and surgery to follow.     2. GI  RUQ/epigastric pain, post-surgical/ gastritis likely contributing: Last CT imaging with no acute findings. Ok to continue Tramadol PRN. Following with palliative care.  Continue Pantoprazole 40mg BID and Famotidine 20mg BID. No longer using Tums as found no benefit. Has found simethicone to be helpful and will continue using q6h.     Oral cold sensitivity, H/o mucositis:   - Admitted on 6/15 due to decreased intake. Can use MMW and doxepin mouthwash prn.   - Encouraged continued use of salt/soda swishes as well as biotene for reported dry/thick mucous in mouth.  Also previously advised to completely avoid cold drinks. Now all improved today.      Nausea: Continue Zofran and Zyprexa at bedtime      Diarrhea: worsened after chemo, reduced as above. Discussed being diligent in using Imodium with any return on diarrhea. Stools are normal now.     External hemorrhoids: Likely due to previous diarrhea. Recommend sitz baths bid and continued use of Preparation H. Also, recommend using Tucks wipes.     3. Neuro  - Has a history of baseline neuropathy for >10 years. Oxaliplatin previously reduced, as above, for progressive neuropathy. Now a little worse with impact on function. Will reduced oxaliplatin to 70%  dosing.     Fatigue: Secondary to chemotherapy. Recommend going for daily walks.     4. FEN  - Dehydration, diarrhea and poor PO intake contributing. Now doing much better. Will give 1L IV NS today, tomorrow, and then every other day x 3. She will call if doing well with intake and may cancel the IVF.   - Hypokalemia. Resolved. Recommend continuing on 20 mEq/day potassium chloride in anticipation of drop with diarrhea with chemotherapy.  - Weight loss. GI symptoms contributing. Now improved. Will have follow-up with dietician next week.      5. Derm  - Dry, erythematous rash on wrists, hands, and right foot. Likely toxicity from 5FU. Discussed hand and foot soaks for 20 minutes bid, followed by application of Eucerin cream, followed by application of socks and gloves.   excoriations. Unclear cause of itching. Recommend hydrocortisone cream prn.    Roxana Sweet PA-C  [facility]  [address] [place] [address]  [phone]

## 2020-07-15 ENCOUNTER — INFUSION THERAPY VISIT (OUTPATIENT)
Dept: ONCOLOGY | Facility: CLINIC | Age: 55
End: 2020-07-15
Attending: PHYSICIAN ASSISTANT
Payer: MEDICARE

## 2020-07-15 VITALS
HEIGHT: 69 IN | TEMPERATURE: 98.1 F | DIASTOLIC BLOOD PRESSURE: 63 MMHG | RESPIRATION RATE: 16 BRPM | HEART RATE: 103 BPM | BODY MASS INDEX: 25.92 KG/M2 | OXYGEN SATURATION: 98 % | SYSTOLIC BLOOD PRESSURE: 120 MMHG | WEIGHT: 175 LBS

## 2020-07-15 DIAGNOSIS — C16.9 GASTRIC ADENOCARCINOMA (H): Primary | ICD-10-CM

## 2020-07-15 DIAGNOSIS — T45.1X5A CHEMOTHERAPY-INDUCED NEUTROPENIA (H): ICD-10-CM

## 2020-07-15 DIAGNOSIS — D70.1 CHEMOTHERAPY-INDUCED NEUTROPENIA (H): ICD-10-CM

## 2020-07-15 LAB
ALBUMIN SERPL-MCNC: 2 G/DL (ref 3.4–5)
ALP SERPL-CCNC: 187 U/L (ref 40–150)
ALT SERPL W P-5'-P-CCNC: 29 U/L (ref 0–50)
ANION GAP SERPL CALCULATED.3IONS-SCNC: 6 MMOL/L (ref 3–14)
AST SERPL W P-5'-P-CCNC: 46 U/L (ref 0–45)
BASOPHILS # BLD AUTO: 0 10E9/L (ref 0–0.2)
BASOPHILS NFR BLD AUTO: 0.4 %
BILIRUB SERPL-MCNC: 0.4 MG/DL (ref 0.2–1.3)
BUN SERPL-MCNC: 10 MG/DL (ref 7–30)
CALCIUM SERPL-MCNC: 7.4 MG/DL (ref 8.5–10.1)
CHLORIDE SERPL-SCNC: 108 MMOL/L (ref 94–109)
CO2 SERPL-SCNC: 24 MMOL/L (ref 20–32)
CREAT SERPL-MCNC: 0.84 MG/DL (ref 0.52–1.04)
DIFFERENTIAL METHOD BLD: ABNORMAL
EOSINOPHIL # BLD AUTO: 0 10E9/L (ref 0–0.7)
EOSINOPHIL NFR BLD AUTO: 0.4 %
ERYTHROCYTE [DISTWIDTH] IN BLOOD BY AUTOMATED COUNT: 22.3 % (ref 10–15)
GFR SERPL CREATININE-BSD FRML MDRD: 79 ML/MIN/{1.73_M2}
GLUCOSE SERPL-MCNC: 92 MG/DL (ref 70–99)
HCT VFR BLD AUTO: 31.2 % (ref 35–47)
HGB BLD-MCNC: 9.6 G/DL (ref 11.7–15.7)
IMM GRANULOCYTES # BLD: 0.1 10E9/L (ref 0–0.4)
IMM GRANULOCYTES NFR BLD: 0.7 %
LYMPHOCYTES # BLD AUTO: 1.5 10E9/L (ref 0.8–5.3)
LYMPHOCYTES NFR BLD AUTO: 15.1 %
MCH RBC QN AUTO: 26.7 PG (ref 26.5–33)
MCHC RBC AUTO-ENTMCNC: 30.8 G/DL (ref 31.5–36.5)
MCV RBC AUTO: 87 FL (ref 78–100)
MONOCYTES # BLD AUTO: 0.5 10E9/L (ref 0–1.3)
MONOCYTES NFR BLD AUTO: 4.7 %
NEUTROPHILS # BLD AUTO: 7.7 10E9/L (ref 1.6–8.3)
NEUTROPHILS NFR BLD AUTO: 78.7 %
NRBC # BLD AUTO: 0 10*3/UL
NRBC BLD AUTO-RTO: 0 /100
PLATELET # BLD AUTO: 345 10E9/L (ref 150–450)
POTASSIUM SERPL-SCNC: 3.8 MMOL/L (ref 3.4–5.3)
PROT SERPL-MCNC: 5.2 G/DL (ref 6.8–8.8)
RBC # BLD AUTO: 3.59 10E12/L (ref 3.8–5.2)
SODIUM SERPL-SCNC: 139 MMOL/L (ref 133–144)
WBC # BLD AUTO: 9.8 10E9/L (ref 4–11)

## 2020-07-15 PROCEDURE — 96413 CHEMO IV INFUSION 1 HR: CPT

## 2020-07-15 PROCEDURE — 80053 COMPREHEN METABOLIC PANEL: CPT | Performed by: PHYSICIAN ASSISTANT

## 2020-07-15 PROCEDURE — 25000125 ZZHC RX 250: Mod: ZF | Performed by: PHYSICIAN ASSISTANT

## 2020-07-15 PROCEDURE — 99214 OFFICE O/P EST MOD 30 MIN: CPT | Mod: ZP | Performed by: PHYSICIAN ASSISTANT

## 2020-07-15 PROCEDURE — 96415 CHEMO IV INFUSION ADDL HR: CPT

## 2020-07-15 PROCEDURE — G0498 CHEMO EXTEND IV INFUS W/PUMP: HCPCS

## 2020-07-15 PROCEDURE — 96375 TX/PRO/DX INJ NEW DRUG ADDON: CPT

## 2020-07-15 PROCEDURE — 25000128 H RX IP 250 OP 636: Mod: ZF | Performed by: PHYSICIAN ASSISTANT

## 2020-07-15 PROCEDURE — G0463 HOSPITAL OUTPT CLINIC VISIT: HCPCS | Mod: ZF

## 2020-07-15 PROCEDURE — 25800030 ZZH RX IP 258 OP 636: Mod: ZF | Performed by: PHYSICIAN ASSISTANT

## 2020-07-15 PROCEDURE — 96417 CHEMO IV INFUS EACH ADDL SEQ: CPT

## 2020-07-15 PROCEDURE — 96368 THER/DIAG CONCURRENT INF: CPT

## 2020-07-15 PROCEDURE — 85025 COMPLETE CBC W/AUTO DIFF WBC: CPT | Performed by: PHYSICIAN ASSISTANT

## 2020-07-15 PROCEDURE — 96367 TX/PROPH/DG ADDL SEQ IV INF: CPT

## 2020-07-15 RX ORDER — LORAZEPAM 2 MG/ML
0.5 INJECTION INTRAMUSCULAR EVERY 4 HOURS PRN
Status: CANCELLED
Start: 2020-07-15

## 2020-07-15 RX ORDER — EPINEPHRINE 1 MG/ML
0.3 INJECTION, SOLUTION INTRAMUSCULAR; SUBCUTANEOUS EVERY 5 MIN PRN
Status: CANCELLED | OUTPATIENT
Start: 2020-07-15

## 2020-07-15 RX ORDER — ONDANSETRON 2 MG/ML
8 INJECTION INTRAMUSCULAR; INTRAVENOUS ONCE
Status: CANCELLED
Start: 2020-07-15

## 2020-07-15 RX ORDER — EPINEPHRINE 0.3 MG/.3ML
0.3 INJECTION SUBCUTANEOUS EVERY 5 MIN PRN
Status: CANCELLED | OUTPATIENT
Start: 2020-07-15

## 2020-07-15 RX ORDER — METHYLPREDNISOLONE SODIUM SUCCINATE 125 MG/2ML
125 INJECTION, POWDER, LYOPHILIZED, FOR SOLUTION INTRAMUSCULAR; INTRAVENOUS
Status: CANCELLED
Start: 2020-07-15

## 2020-07-15 RX ORDER — DIPHENHYDRAMINE HYDROCHLORIDE 50 MG/ML
50 INJECTION INTRAMUSCULAR; INTRAVENOUS
Status: CANCELLED
Start: 2020-07-15

## 2020-07-15 RX ORDER — ALBUTEROL SULFATE 0.83 MG/ML
2.5 SOLUTION RESPIRATORY (INHALATION)
Status: CANCELLED | OUTPATIENT
Start: 2020-07-15

## 2020-07-15 RX ORDER — MEPERIDINE HYDROCHLORIDE 25 MG/ML
25 INJECTION INTRAMUSCULAR; INTRAVENOUS; SUBCUTANEOUS EVERY 30 MIN PRN
Status: CANCELLED | OUTPATIENT
Start: 2020-07-15

## 2020-07-15 RX ORDER — ALBUTEROL SULFATE 90 UG/1
1-2 AEROSOL, METERED RESPIRATORY (INHALATION)
Status: CANCELLED
Start: 2020-07-15

## 2020-07-15 RX ORDER — HEPARIN SODIUM (PORCINE) LOCK FLUSH IV SOLN 100 UNIT/ML 100 UNIT/ML
5 SOLUTION INTRAVENOUS
Status: CANCELLED | OUTPATIENT
Start: 2020-07-16

## 2020-07-15 RX ORDER — NALOXONE HYDROCHLORIDE 0.4 MG/ML
.1-.4 INJECTION, SOLUTION INTRAMUSCULAR; INTRAVENOUS; SUBCUTANEOUS
Status: CANCELLED | OUTPATIENT
Start: 2020-07-15

## 2020-07-15 RX ORDER — ONDANSETRON 2 MG/ML
8 INJECTION INTRAMUSCULAR; INTRAVENOUS ONCE
Status: COMPLETED | OUTPATIENT
Start: 2020-07-15 | End: 2020-07-15

## 2020-07-15 RX ORDER — HEPARIN SODIUM (PORCINE) LOCK FLUSH IV SOLN 100 UNIT/ML 100 UNIT/ML
5 SOLUTION INTRAVENOUS ONCE
Status: COMPLETED | OUTPATIENT
Start: 2020-07-15 | End: 2020-07-15

## 2020-07-15 RX ORDER — SODIUM CHLORIDE 9 MG/ML
1000 INJECTION, SOLUTION INTRAVENOUS CONTINUOUS PRN
Status: CANCELLED
Start: 2020-07-15

## 2020-07-15 RX ADMIN — FAMOTIDINE 20 MG: 10 INJECTION INTRAVENOUS at 10:53

## 2020-07-15 RX ADMIN — OXALIPLATIN 118 MG: 5 INJECTION, SOLUTION INTRAVENOUS at 12:40

## 2020-07-15 RX ADMIN — LEUCOVORIN CALCIUM 400 MG: 200 INJECTION, POWDER, LYOPHILIZED, FOR SOLUTION INTRAMUSCULAR; INTRAVENOUS at 12:38

## 2020-07-15 RX ADMIN — DEXTROSE MONOHYDRATE 250 ML: 50 INJECTION, SOLUTION INTRAVENOUS at 12:38

## 2020-07-15 RX ADMIN — Medication 5 ML: at 09:30

## 2020-07-15 RX ADMIN — SODIUM CHLORIDE 1000 ML: 9 INJECTION, SOLUTION INTRAVENOUS at 10:51

## 2020-07-15 RX ADMIN — DOCETAXEL 100 MG: 20 INJECTION, SOLUTION, CONCENTRATE INTRAVENOUS at 11:25

## 2020-07-15 RX ADMIN — ONDANSETRON 8 MG: 2 INJECTION INTRAMUSCULAR; INTRAVENOUS at 10:53

## 2020-07-15 RX ADMIN — DEXAMETHASONE SODIUM PHOSPHATE: 10 INJECTION, SOLUTION INTRAMUSCULAR; INTRAVENOUS at 10:59

## 2020-07-15 ASSESSMENT — PAIN SCALES - GENERAL: PAINLEVEL: MODERATE PAIN (5)

## 2020-07-15 ASSESSMENT — MIFFLIN-ST. JEOR: SCORE: 1463.48

## 2020-07-15 NOTE — PATIENT INSTRUCTIONS
Diarrhea. If returns, take 2 Imodium with each loose stool up to 8/day. Call clinic if not controlling diarrhea.     Hemorrhoids. Sit in bathtub with warm water for 20 minutes twice/day. Continue to use Preparation H and Tucks wipes.    Fatigue. Go for daily walks.    Low potassium. Better today. Recommend staying on same potassium dose at 20 mEq/day.     Dry skin. Soak hands and feet in water for 20 minutes twice/day. Apply Eucerin cream and then socks and gloves after soaking.    Skin lesions. Okay to use hydrocortisone cream for skin lesions on arms and feet.    Contact Numbers  [facility]: [phone]    After Hours:  [phone]  Triage: [phone]    Please call the [facility] Triage line if you experience a temperature greater than or equal to 100.5, shaking chills, have uncontrolled nausea, vomiting and/or diarrhea, dizziness, shortness of breath, chest pain, bleeding, unexplained bruising, or if you have any other new/concerning symptoms, questions or concerns.     If it is after hours, weekends, or holidays, please call the main hospital  at  [phone] and ask to speak to the Oncology doctor on call.     If you are having any concerning symptoms or wish to speak to a provider before your next infusion visit, please call your care coordinator or triage to notify them so we can adequately serve you.     If you need a refill on a narcotic prescription or other medication, please call triage before your infusion appointment.

## 2020-07-15 NOTE — PROGRESS NOTES
"Infusion Nursing Note:  Nathalie Bashir presents today for C7 Taxotere-Leucovorin-Oxaliplatin-Fluorouracil pump.    Patient seen by provider today: Yes: RAHEEL Sainz   present during visit today: Not Applicable.    Note: Pt saw provider prior to infusion, ok for teatment.    Intravenous Access:  Implanted Port.    Treatment Conditions:  Lab Results   Component Value Date    HGB 9.6 07/15/2020     Lab Results   Component Value Date    WBC 9.8 07/15/2020      Lab Results   Component Value Date    ANEU 7.7 07/15/2020     Lab Results   Component Value Date     07/15/2020      Lab Results   Component Value Date     07/15/2020                   Lab Results   Component Value Date    POTASSIUM 3.8 07/15/2020           Lab Results   Component Value Date    MAG 1.6 07/08/2020            Lab Results   Component Value Date    CR 0.84 07/15/2020                   Lab Results   Component Value Date    PETRA 7.4 07/15/2020                Lab Results   Component Value Date    BILITOTAL 0.4 07/15/2020           Lab Results   Component Value Date    ALBUMIN 2.0 07/15/2020                    Lab Results   Component Value Date    ALT 29 07/15/2020           Lab Results   Component Value Date    AST 46 07/15/2020       Results reviewed, labs MET treatment parameters, ok to proceed with treatment.      Post Infusion Assessment:  Patient tolerated infusion without incident.  Blood return noted pre and post infusion.  Site patent and intact, free from redness, edema or discomfort.  No evidence of extravasations.   Prior to discharge: Port is secured in place with tegaderm and flushed with 10cc NS with positive blood return noted.  Continuous home infusion CADD pump connected.    All connectors secured in place and clamps taped open.    Pump started, \"running\" noted on display (CADD): YES.  Patient instructed to call our clinic or [facility] with any questions or concerns at home.  Patient verbalized " understanding.    Patient set up for pump disconnect/IVF/Onpro Neulasta at our clinic on 7/16.          Discharge Plan:   Prescription refills given for DEX.  Discharge instructions reviewed with: Patient.  Patient and/or family verbalized understanding of discharge instructions and all questions answered.  Copy of AVS reviewed with patient and/or family.  Patient will return 7/19 for next appointment.  Patient discharged in stable condition accompanied by: self.  Departure Mode: Ambulatory.    Ingrid Pineda RN

## 2020-07-15 NOTE — NURSING NOTE
Chief Complaint   Patient presents with     Port Draw     Labs drawn via PORT by RN in lab. VS taken.      Fany Ayon RN

## 2020-07-15 NOTE — NURSING NOTE
"Oncology Rooming Note    July 15, 2020 9:44 AM   Nathalie Bashir is a 54 year old female who presents for:    Chief Complaint   Patient presents with     Port Draw     Labs drawn via PORT by RN in lab. VS taken.      Oncology Clinic Visit     Return: Gastric adenocarcinoma     Initial Vitals: /63 (BP Location: Right arm, Patient Position: Sitting, Cuff Size: Adult Regular)   Pulse 103   Temp 98.1  F (36.7  C) (Oral)   Resp 16   Ht 1.753 m (5' 9.02\")   Wt 79.4 kg (175 lb)   LMP 09/23/2014   SpO2 98%   BMI 25.83 kg/m   Estimated body mass index is 25.83 kg/m  as calculated from the following:    Height as of this encounter: 1.753 m (5' 9.02\").    Weight as of this encounter: 79.4 kg (175 lb). Body surface area is 1.97 meters squared.  Moderate Pain (5) Comment: Data Unavailable   Patient's last menstrual period was 09/23/2014.  Allergies reviewed: Yes  Medications reviewed: Yes    Medications: Medication refills not needed today.  Pharmacy name entered into [facility]: [facility] [place], [place] - [id number]    Clinical concerns: N/A       Meliza Adamson CMA              "

## 2020-07-15 NOTE — PATIENT INSTRUCTIONS
Diarrhea. If returns, take 2 Imodium with each loose stool up to 8/day. Call clinic if not controlling diarrhea.     Hemorrhoids. Sit in bathtub with warm water for 20 minutes twice/day. Continue to use Preparation H and Tucks wipes.    Fatigue. Go for daily walks.    Low potassium. Better today. Recommend staying on same potassium dose at 20 mEq/day.     Dry skin. Soak hands and feet in water for 20 minutes twice/day. Apply Eucerin cream and then socks and gloves after soaking.    Skin lesions. Okay to use hydrocortisone cream for skin lesions on arms and feet.

## 2020-07-15 NOTE — LETTER
7/15/2020         RE: Nathalie Bashir  [address] [id number]        Dear Colleague,    Thank you for referring your patient, Nathalie Bashir, to the [facility]. Please see a copy of my visit note below.    Oncology/Hematology Visit Note  Jul 15, 2020    Reason for Visit: Follow up of gastric adenocarcinoma.    History of Present Illness:   Ms. Bashir is a 54 year old female with a history of gastric adenocarcinoma.  She was being followed for a pancreatic duct dilatation and pancreatic cyst which was noted in November 2018.  Since May 2019 she started noticing some nausea and vomiting and occasional abdominal discomfort.  She had an ultrasound in August 2019 which was essentially unremarkable.  She had a repeat endoscopic ultrasound on 10/29/2019 which showed increase in size of the cyst as well as dilation of the main pancreatic duct.  FNA and fluid analysis showed mucinous epithelium.  She was referred to Dr. 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  Surprisingly there was no pancreatic ductal neoplasm seen but on the resected specimen stomach cancer was noted.  It was poorly differentiated adenocarcinoma with poorly cohesive/signet ring cell type with proximal gastric mucosal and serosal margins being positive.  There was lymphovascular and perineural invasion seen.  22 lymph nodes were sampled and all were benign.  It was a pT4aN0 lesion.  HER-2/christine FISH was not amplified.         EUS on 3/3/2020 without clear evidence of neoplasm endoscopically. Left gastric lymph node fine-needle aspiration was negative for carcinoma. Biopsy from the gastric jejunal anastomosis as well as lesser curvature of the stomach also did not show any malignancy.     She had a PET/CT on 3/13/2020 which showed soft tissue streaky density with increased FDG uptake adjacent to the pancreaticojejunostomy which could be neoplastic in origin.  There is mild increased  FDG uptake in the gastric remnant.  Focal area of soft tissue thickening with fatty streakiness along medial laparotomy with increased FDG uptake which likely is inflammatory. Increased FDG uptake in thoracic esophagus which could be esophagitis.      She started on chemotherapy with 5FU, oxaliplatin, and Taxotere on 3/27/20. She was seen on 4/7/20 for evaluation of fevers. She was treated for possible pneumonia with Levaquin.      She was seen in clinic 4/13/20 for RUQ pain,CT and labs reassuring. She received cycles 2 and 3 of chemotherapy on 4/20/20 and 5/4/20. She was admitted from 5/21-5/23/20 with neutropenic aspiration pneumonia. She received cycle 4 on 5/27/20 with Neulasta support. PET/CT on 6/3/20 showed mild residual uptake at the site of gastrojejunal anastomosis which likely is physiologic.  There is almost resolution of soft tissue nodule next to the pancreaticoduodenal anastomosis without FDG uptake.  There is focal increased uptake in the left posterior acetabulum which could be artifact.     Decreased dose of oxaliplatin due to neuropathy/cold sensitivity on 6/11. Decreased 5FU to 85% with cycle #6 due to mucositis/diarrhea/taste changes. Treatment was held 7/9/20 due to diarrhea, dehydration, tachycardia, rash, and weight loss. She is here today for routine follow-up prior to consideration of delayed cycle 7 5FU, oxaliplatin, and Taxotere.    Interval History:  Patient reports that she has been eating and drinking better this week.  She does note that she has constant neuropathy in her toes and feet as well as her fingertips that is occasionally painful.  She does not feel like she is tripping over her feet, but she does have difficulty opening things and some difficulty with writing.  She continues to have constant central abdominal pain that she rates anywhere from a 3-6 out of 10.  It improves somewhat with lying on her right side.  She has had some ongoing nausea managed with Zofran and she has  continued to take the Zyprexa.  She denies any vomiting in the last week.  She reports having twice daily formed bowel movements lately.  She reports her energy is fair and she has gone on some occasional short walks.  She notes that she had a couple of nights where she was waking up every couple of hours due to acid despite taking her regular medicines for acid reflux.  She notes this has improved over the last 2 days.  She has had more difficulty with hemorrhoids recently with some associated bleeding.  She has been applying Preparation H and feels this is improving.  She denies any mouth sores currently.  She has ongoing cold sensitivity, but feels it is less intense now than it was right after her last cycle of chemotherapy. The skin on her hands and feet remains dry. She denies other concerns.    Review of Systems:  Patient denies any of the following except if noted above: fevers, chills, vision or hearing changes, chest pain, dyspnea, vomiting, diarrhea, constipation, urinary concerns, headaches, or issues with mood. She denies other bleeding issues.     Current Outpatient Medications   Medication Sig Dispense Refill     amitriptyline (ELAVIL) 25 MG tablet Take 3 tablets by mouth At Bedtime       benzocaine-menthol (CEPACOL) 15-3.6 MG lozenge Place 1 lozenge inside cheek every hour as needed for sore throat 15 lozenge 1     calcium carb 1250 mg, 500 mg Agdaagux,/vitamin D 200 units (OSCAL WITH D) 500-200 MG-UNIT per tablet Take 1 tablet by mouth 3 times daily (with meals)       citalopram (CELEXA) 20 MG tablet Take 20 mg by mouth every morning        dexamethasone (DECADRON) 4 MG tablet Take 1 tablet (4mg) the day before, day of, and 2 days after chemotherapy 12 tablet 3     diclofenac (FLECTOR) 1.3 % patch Place 1 patch onto the skin daily as needed        doxepin (SINEQUAN) 10 MG/ML (HIGH CONC) solution Take 1 mL (10 mg) by mouth 2 times daily 15 mL 1     famotidine (PEPCID) 20 MG tablet Take 1 tablet (20 mg)  by mouth 2 times daily 60 tablet 3     fluticasone (FLONASE) 50 MCG/ACT nasal spray Spray 2 sprays into both nostrils daily as needed        hydrOXYzine (ATARAX) 10 MG tablet Take 10 mg by mouth 3 times daily as needed for itching       loperamide (IMODIUM) 2 MG capsule Take 2 mg by mouth 4 times daily as needed for diarrhea       LORazepam (ATIVAN) 0.5 MG tablet Take 1 tablet (0.5 mg) by mouth every 4 hours as needed (Anxiety, Nausea/Vomiting or Sleep) 30 tablet 5     magic mouthwash suspension, diphenhydrAMINE, lidocaine, aluminum-magnesium & simethicone, (FIRST-MOUTHWASH BLM) compounding kit Swish and spit 10 mLs in mouth every 6 hours as needed for mouth sores (Patient not taking: Reported on 7/9/2020) 1 Bottle 1     meclizine (ANTIVERT) 25 MG tablet Take 1 tablet (25 mg) by mouth 3 times daily as needed for dizziness 60 tablet 0     multivitamin, therapeutic with minerals (MULTI-VITAMIN) TABS Take 1 tablet by mouth daily       naloxone (NARCAN) 4 MG/0.1ML nasal spray Spray 1 spray (4 mg) into one nostril alternating nostrils once as needed for opioid reversal every 2-3 minutes until assistance arrives (Patient not taking: Reported on 7/9/2020) 0.2 mL 0     OLANZapine zydis (ZYPREXA) 5 MG ODT Take 1 tablet (5 mg) by mouth At Bedtime 30 tablet 0     ondansetron (ZOFRAN) 8 MG tablet Take 1 tablet (8 mg) by mouth every 8 hours as needed (Nausea/Vomiting) 90 tablet 3     ondansetron (ZOFRAN-ODT) 4 MG ODT tab Take 1 tablet (4 mg) by mouth every 6 hours as needed for nausea or vomiting 90 tablet 3     oxyCODONE (ROXICODONE) 5 MG tablet Take 1 tablet (5 mg) by mouth 3 times daily as needed for pain (. Stop tramadol. Cannot take with tramadol.) 45 tablet 0     pantoprazole (PROTONIX) 40 MG EC tablet TAKE ONE TABLET BY MOUTH TWICE A DAY 60 tablet 1     potassium chloride ER (KLOR-CON M) 20 MEQ CR tablet Take 1 tablet (20 mEq) by mouth daily 30 tablet 1     senna-docusate (SENOKOT-S/PERICOLACE) 8.6-50 MG tablet Take 1  tablet by mouth daily as needed for constipation (Patient not taking: Reported on 7/9/2020) 30 tablet 0     simethicone 80 MG TABS Take 1 tablet by mouth every 6 hours as needed (bloating, gas, belching) 90 tablet 3     SUMAtriptan (IMITREX) 100 MG tablet Take 100 mg by mouth as needed       tiZANidine (ZANAFLEX) 4 MG tablet Take 1 tablet by mouth 2 times daily as needed         Physical Examination:  General: The patient is a pleasant female in no acute distress. Wearing a head covering.  /63 (BP Location: Right arm, Patient Position: Sitting, Cuff Size: Adult Regular)   Pulse 103   Temp 98.1  F (36.7  C) (Oral)   Resp 16   Wt 79.4 kg (175 lb)   LMP 09/23/2014   SpO2 98%   BMI 25.83 kg/m    Wt Readings from Last 10 Encounters:   07/15/20 79.4 kg (175 lb)   07/08/20 75.7 kg (166 lb 14.4 oz)   07/08/20 75.7 kg (166 lb 14.4 oz)   06/25/20 79 kg (174 lb 1.6 oz)   06/24/20 78.9 kg (174 lb)   06/24/20 79 kg (174 lb 1.6 oz)   06/17/20 81 kg (178 lb 9.6 oz)   06/10/20 79.7 kg (175 lb 12.8 oz)   06/10/20 81.6 kg (180 lb)   05/27/20 83.6 kg (184 lb 3.2 oz)   HEENT: EOMI. Sclerae are anicteric.   Lungs: Breathing is not labored.    Rectum: External non-bleeding hemorrhoids noted.   Extremities: No lower extremity edema noted bilaterally.   Neuro: Cranial nerves II through XII are grossly intact.  Skin: Skin on hands and feet is moderately dry with some cracking. Excoriated lesions noted on arms and ankles.     Laboratory Data:   7/15/2020 09:32   Sodium 139   Potassium 3.8   Chloride 108   Carbon Dioxide 24   Urea Nitrogen 10   Creatinine 0.84   GFR Estimate 79   GFR Estimate If Black >90   Calcium 7.4 (L)   Anion Gap 6   Albumin 2.0 (L)   Protein Total 5.2 (L)   Bilirubin Total 0.4   Alkaline Phosphatase 187 (H)   ALT 29   AST 46 (H)   Glucose 92   WBC 9.8   Hemoglobin 9.6 (L)   Hematocrit 31.2 (L)   Platelet Count 345   RBC Count 3.59 (L)   MCV 87   MCH 26.7   MCHC 30.8 (L)   RDW 22.3 (H)   Diff Method  Automated Method   % Neutrophils 78.7   % Lymphocytes 15.1   % Monocytes 4.7   % Eosinophils 0.4   % Basophils 0.4   % Immature Granulocytes 0.7   Nucleated RBCs 0   Absolute Neutrophil 7.7   Absolute Lymphocytes 1.5   Absolute Monocytes 0.5   Absolute Eosinophils 0.0   Absolute Basophils 0.0   Abs Immature Granulocytes 0.1   Absolute Nucleated RBC 0.0     Assessment and Plan:    1. Gastric adenocarcinona, HER-2 negative.   - Started on neoadjuvant FLOT on 3/7/2020. She has required a dose reduction of her oxaliplatin by 20% due to cold sensitivity/neuropathy with cycle #5. 5FU was decreased to 85% with cycle #6 due to mucositis/diarrhea/taste changes.   - Cycle 7 was held last week due to diarrhea, dehydration, tachycardia, rash, and weight loss. She is doing much better now, but her neuropathy has worsened. Will continue with cycle 7 today with decreasing oxaliplatin to 70% dosing.   - Plan for 8 cycles with PET and surgery to follow.     2. GI  RUQ/epigastric pain, post-surgical/ gastritis likely contributing: Last CT imaging with no acute findings. Ok to continue Tramadol PRN. Following with palliative care.  Continue Pantoprazole 40mg BID and Famotidine 20mg BID. No longer using Tums as found no benefit. Has found simethicone to be helpful and will continue using q6h.     Oral cold sensitivity, H/o mucositis:   - Admitted on 6/15 due to decreased intake. Can use MMW and doxepin mouthwash prn.   - Encouraged continued use of salt/soda swishes as well as biotene for reported dry/thick mucous in mouth.  Also previously advised to completely avoid cold drinks. Now all improved today.      Nausea: Continue Zofran and Zyprexa at bedtime      Diarrhea: worsened after chemo, reduced as above. Discussed being diligent in using Imodium with any return on diarrhea. Stools are normal now.     External hemorrhoids: Likely due to previous diarrhea. Recommend sitz baths bid and continued use of Preparation H. Also, recommend  using Tucks wipes.     3. Neuro  - Has a history of baseline neuropathy for >10 years. Oxaliplatin previously reduced, as above, for progressive neuropathy. Now a little worse with impact on function. Will reduced oxaliplatin to 70% dosing.     Fatigue: Secondary to chemotherapy. Recommend going for daily walks.     4. FEN  - Dehydration, diarrhea and poor PO intake contributing. Now doing much better. Will give 1L IV NS today, tomorrow, and then every other day x 3. She will call if doing well with intake and may cancel the IVF.   - Hypokalemia. Resolved. Recommend continuing on 20 mEq/day potassium chloride in anticipation of drop with diarrhea with chemotherapy.  - Weight loss. GI symptoms contributing. Now improved. Will have follow-up with dietician next week.      5. Derm  - Dry, erythematous rash on wrists, hands, and right foot. Likely toxicity from 5FU. Discussed hand and foot soaks for 20 minutes bid, followed by application of Eucerin cream, followed by application of socks and gloves.   excoriations. Unclear cause of itching. Recommend hydrocortisone cream prn.    Roxana Sweet PA-C  [facility]  [address] [place] [address]  [phone]      Again, thank you for allowing me to participate in the care of your patient.        Sincerely,        Roxana Sweet PA-C

## 2020-07-16 ENCOUNTER — INFUSION THERAPY VISIT (OUTPATIENT)
Dept: ONCOLOGY | Facility: CLINIC | Age: 55
End: 2020-07-16
Attending: PHYSICIAN ASSISTANT
Payer: MEDICARE

## 2020-07-16 VITALS
SYSTOLIC BLOOD PRESSURE: 99 MMHG | DIASTOLIC BLOOD PRESSURE: 65 MMHG | OXYGEN SATURATION: 99 % | TEMPERATURE: 98.5 F | HEART RATE: 107 BPM | RESPIRATION RATE: 16 BRPM

## 2020-07-16 DIAGNOSIS — T45.1X5A CHEMOTHERAPY-INDUCED NEUTROPENIA (H): Primary | ICD-10-CM

## 2020-07-16 DIAGNOSIS — D70.1 CHEMOTHERAPY-INDUCED NEUTROPENIA (H): Primary | ICD-10-CM

## 2020-07-16 DIAGNOSIS — C16.9 GASTRIC ADENOCARCINOMA (H): ICD-10-CM

## 2020-07-16 PROCEDURE — 96377 APPLICATON ON-BODY INJECTOR: CPT | Mod: 59

## 2020-07-16 PROCEDURE — 96360 HYDRATION IV INFUSION INIT: CPT

## 2020-07-16 PROCEDURE — 25800030 ZZH RX IP 258 OP 636: Mod: ZF | Performed by: PHYSICIAN ASSISTANT

## 2020-07-16 PROCEDURE — 25000128 H RX IP 250 OP 636: Mod: ZF | Performed by: PHYSICIAN ASSISTANT

## 2020-07-16 RX ORDER — HEPARIN SODIUM (PORCINE) LOCK FLUSH IV SOLN 100 UNIT/ML 100 UNIT/ML
5 SOLUTION INTRAVENOUS
Status: DISCONTINUED | OUTPATIENT
Start: 2020-07-16 | End: 2020-07-16 | Stop reason: HOSPADM

## 2020-07-16 RX ADMIN — PEGFILGRASTIM 6 MG: KIT SUBCUTANEOUS at 14:59

## 2020-07-16 RX ADMIN — Medication 5 ML: at 15:10

## 2020-07-16 RX ADMIN — SODIUM CHLORIDE 1000 ML: 9 INJECTION, SOLUTION INTRAVENOUS at 13:58

## 2020-07-16 ASSESSMENT — PAIN SCALES - GENERAL: PAINLEVEL: MILD PAIN (3)

## 2020-07-16 NOTE — PROGRESS NOTES
Infusion Nursing Note:  Nathalie Bashir presents today for C7 D2 Fluorouracil pump disconnect/1L IVF's/Neulasta On-Pro.    Patient seen by provider today: No   present during visit today: Not Applicable.    Note: Patient arrives feeling okay. Reports fatigue, bilateral ankle swelling (L>R), and nausea. Pt took Zofran around 8am and Ativan prior to arrival to infusion which alleviated symptom. No further intervention required. Ankle edema assessed by RN; mild and non-pitting. Elevated legs on pillow and encouraged patient to continue to do so at home. Taking Dexamethasone as directed. Pt using Eucerin on dry skin areas, notably hands and feet. Reddened area on sides of feet noted. Denies pain in these areas, but does have ongoing mid-abdominal pain. Reports pain 3/10. No further intervention requested.    Intravenous Access:  Implanted Port.    Treatment Conditions:  Lab Results   Component Value Date    HGB 9.6 07/15/2020     Lab Results   Component Value Date    WBC 9.8 07/15/2020      Lab Results   Component Value Date    ANEU 7.7 07/15/2020     Lab Results   Component Value Date     07/15/2020      Lab Results   Component Value Date     07/15/2020                   Lab Results   Component Value Date    POTASSIUM 3.8 07/15/2020           Lab Results   Component Value Date    MAG 1.6 07/08/2020            Lab Results   Component Value Date    CR 0.84 07/15/2020                   Lab Results   Component Value Date    PETRA 7.4 07/15/2020                Lab Results   Component Value Date    BILITOTAL 0.4 07/15/2020           Lab Results   Component Value Date    ALBUMIN 2.0 07/15/2020                    Lab Results   Component Value Date    ALT 29 07/15/2020           Lab Results   Component Value Date    AST 46 07/15/2020           Post Infusion Assessment:  Patient tolerated infusion without incident.  Blood return noted pre and post infusion.  Site patent and intact, free from redness, edema or  discomfort.  No evidence of extravasations.  Access discontinued per protocol.     Neulasta Onpro On-Body injector applied to left lower abdomen at 1500 with light facing up.  Writer discussed Neulasta injection would start tomorrow 7/17 at 1800, approximately 27 hours after application applied today.  Written and Verbal instruction reviewed with patient.  Pt instructed when the dose delivery starts, it will take about 45 minutes to complete.  Pt aware Neulasta Onpro On-Body should have green flashing light and to call triage or on-call MD if injector flashes red or appears to be leaking. Pt aware to keep Onpro On-Body Neulasta 4 inches away from electrical equipment and to avoid showering 4 hours prior to injection.   Neulasta Onpro Lot number: [id number]      Discharge Plan:   Patient declined prescription refills.  Discharge instructions reviewed with: Patient.  Patient and/or family verbalized understanding of discharge instructions and all questions answered.  AVS to patient via [facility].  Patient will return 7/19 for fluids.  Patient discharged in stable condition accompanied by: self.  Departure Mode: Ambulatory with cane.    Marcie Quispe RN

## 2020-07-17 ENCOUNTER — PATIENT OUTREACH (OUTPATIENT)
Dept: SURGERY | Facility: CLINIC | Age: 55
End: 2020-07-17

## 2020-07-17 ENCOUNTER — PREP FOR PROCEDURE (OUTPATIENT)
Dept: SURGERY | Facility: CLINIC | Age: 55
End: 2020-07-17

## 2020-07-17 ENCOUNTER — HOSPITAL ENCOUNTER (INPATIENT)
Facility: CLINIC | Age: 55
Setting detail: SURGERY ADMIT
End: 2020-07-17
Attending: SURGERY | Admitting: SURGERY
Payer: MEDICARE

## 2020-07-17 DIAGNOSIS — C16.9 GASTRIC CANCER (H): ICD-10-CM

## 2020-07-17 DIAGNOSIS — C16.9 GASTRIC CANCER (H): Primary | ICD-10-CM

## 2020-07-17 DIAGNOSIS — C16.2 MALIGNANT NEOPLASM OF BODY OF STOMACH (H): Primary | ICD-10-CM

## 2020-07-17 NOTE — TELEPHONE ENCOUNTER
Surgical Oncology RN Care Coordination Note:     Patient scheduled to finish cycle 8 of FLOT on 7/29, will plan for patient to see Dr. Mallory within 1-2 weeks post last treatment with new PET scan and visit.     Will also arrange for PAC consult and tentative surgery date 4-6 weeks post last chemo treatment.     07/17/2020 3:09 PM - Called and spoke with patient regarding plan outlined above. Informed her that the dates and times are subject to change pending any delays in chemo. Informed her that we will also be scheduling her tentatively for surgery 4-6 weeks post last infusion to ensure she is on the schedule in the appropriate time frame. Informed her that the finalized plan and surgery discussion would be pending results of new PET scan and visit with MD. She verbalized understanding and agrees with plan.           Kenzie Ace RN, BSN  Care Coordinator   [phone]

## 2020-07-20 ENCOUNTER — VIRTUAL VISIT (OUTPATIENT)
Dept: ONCOLOGY | Facility: CLINIC | Age: 55
DRG: 445 | End: 2020-07-20
Attending: DIETITIAN, REGISTERED
Payer: MEDICARE

## 2020-07-20 ENCOUNTER — HOSPITAL ENCOUNTER (INPATIENT)
Facility: CLINIC | Age: 55
LOS: 9 days | Discharge: HOME | DRG: 445 | End: 2020-07-29
Attending: INTERNAL MEDICINE | Admitting: HOSPITALIST
Payer: MEDICARE

## 2020-07-20 ENCOUNTER — APPOINTMENT (OUTPATIENT)
Dept: CT IMAGING | Facility: CLINIC | Age: 55
DRG: 445 | End: 2020-07-20
Attending: INTERNAL MEDICINE
Payer: MEDICARE

## 2020-07-20 ENCOUNTER — TELEPHONE (OUTPATIENT)
Dept: ONCOLOGY | Facility: CLINIC | Age: 55
End: 2020-07-20

## 2020-07-20 DIAGNOSIS — C16.9 GASTRIC ADENOCARCINOMA (H): ICD-10-CM

## 2020-07-20 DIAGNOSIS — K83.09 CHOLANGITIS (H): ICD-10-CM

## 2020-07-20 DIAGNOSIS — C16.9 MALIGNANT NEOPLASM OF STOMACH, UNSPECIFIED LOCATION (H): ICD-10-CM

## 2020-07-20 DIAGNOSIS — K83.09 CHOLANGITIS (H): Primary | ICD-10-CM

## 2020-07-20 DIAGNOSIS — C16.9 GASTRIC ADENOCARCINOMA (H): Primary | ICD-10-CM

## 2020-07-20 LAB
ALBUMIN SERPL-MCNC: 2.2 G/DL (ref 3.4–5)
ALBUMIN UR-MCNC: 30 MG/DL
ALP SERPL-CCNC: 267 U/L (ref 40–150)
ALT SERPL W P-5'-P-CCNC: 45 U/L (ref 0–50)
ANION GAP SERPL CALCULATED.3IONS-SCNC: 4 MMOL/L (ref 3–14)
ANISOCYTOSIS BLD QL SMEAR: ABNORMAL
APPEARANCE UR: ABNORMAL
AST SERPL W P-5'-P-CCNC: 60 U/L (ref 0–45)
BASOPHILS # BLD AUTO: 0 10E9/L (ref 0–0.2)
BASOPHILS NFR BLD AUTO: 0 %
BILIRUB SERPL-MCNC: 0.6 MG/DL (ref 0.2–1.3)
BILIRUB UR QL STRIP: NEGATIVE
BUN SERPL-MCNC: 7 MG/DL (ref 7–30)
CALCIUM SERPL-MCNC: 7.7 MG/DL (ref 8.5–10.1)
CHLORIDE SERPL-SCNC: 109 MMOL/L (ref 94–109)
CO2 SERPL-SCNC: 26 MMOL/L (ref 20–32)
COLOR UR AUTO: YELLOW
CREAT SERPL-MCNC: 0.71 MG/DL (ref 0.52–1.04)
CREAT SERPL-MCNC: 0.74 MG/DL (ref 0.52–1.04)
CRP SERPL-MCNC: 11 MG/L (ref 0–8)
DACRYOCYTES BLD QL SMEAR: SLIGHT
DIFFERENTIAL METHOD BLD: ABNORMAL
EOSINOPHIL # BLD AUTO: 0 10E9/L (ref 0–0.7)
EOSINOPHIL NFR BLD AUTO: 0 %
ERYTHROCYTE [DISTWIDTH] IN BLOOD BY AUTOMATED COUNT: 22.9 % (ref 10–15)
ERYTHROCYTE [SEDIMENTATION RATE] IN BLOOD BY WESTERGREN METHOD: 27 MM/H (ref 0–30)
GFR SERPL CREATININE-BSD FRML MDRD: >90 ML/MIN/{1.73_M2}
GFR SERPL CREATININE-BSD FRML MDRD: >90 ML/MIN/{1.73_M2}
GLUCOSE SERPL-MCNC: 99 MG/DL (ref 70–99)
GLUCOSE UR STRIP-MCNC: NEGATIVE MG/DL
HCT VFR BLD AUTO: 28.8 % (ref 35–47)
HGB BLD-MCNC: 8.8 G/DL (ref 11.7–15.7)
HGB UR QL STRIP: ABNORMAL
INR PPP: 1.2 (ref 0.86–1.14)
KETONES UR STRIP-MCNC: NEGATIVE MG/DL
LACTATE BLD-SCNC: 1.5 MMOL/L (ref 0.7–2)
LEUKOCYTE ESTERASE UR QL STRIP: ABNORMAL
LIPASE SERPL-CCNC: 31 U/L (ref 73–393)
LYMPHOCYTES # BLD AUTO: 2 10E9/L (ref 0.8–5.3)
LYMPHOCYTES NFR BLD AUTO: 7.9 %
MCH RBC QN AUTO: 27 PG (ref 26.5–33)
MCHC RBC AUTO-ENTMCNC: 30.6 G/DL (ref 31.5–36.5)
MCV RBC AUTO: 88 FL (ref 78–100)
METAMYELOCYTES # BLD: 0.7 10E9/L
METAMYELOCYTES NFR BLD MANUAL: 2.6 %
MONOCYTES # BLD AUTO: 0.5 10E9/L (ref 0–1.3)
MONOCYTES NFR BLD AUTO: 1.8 %
MYELOCYTES # BLD: 2.9 10E9/L
MYELOCYTES NFR BLD MANUAL: 11.4 %
NEUTROPHILS # BLD AUTO: 19.4 10E9/L (ref 1.6–8.3)
NEUTROPHILS NFR BLD AUTO: 76.3 %
NITRATE UR QL: NEGATIVE
OVALOCYTES BLD QL SMEAR: SLIGHT
PH UR STRIP: 7 PH (ref 5–7)
PLATELET # BLD AUTO: 102 10E9/L (ref 150–450)
PLATELET # BLD AUTO: 87 10E9/L (ref 150–450)
PLATELET # BLD EST: ABNORMAL 10*3/UL
POIKILOCYTOSIS BLD QL SMEAR: SLIGHT
POTASSIUM SERPL-SCNC: 3.4 MMOL/L (ref 3.4–5.3)
PROT SERPL-MCNC: 5.2 G/DL (ref 6.8–8.8)
RBC # BLD AUTO: 3.26 10E12/L (ref 3.8–5.2)
RBC #/AREA URNS AUTO: 5 /HPF (ref 0–2)
SODIUM SERPL-SCNC: 139 MMOL/L (ref 133–144)
SOURCE: ABNORMAL
SP GR UR STRIP: 1.02 (ref 1–1.03)
SQUAMOUS #/AREA URNS AUTO: 16 /HPF (ref 0–1)
TRANS CELLS #/AREA URNS HPF: 2 /HPF (ref 0–1)
UROBILINOGEN UR STRIP-MCNC: 2 MG/DL (ref 0–2)
WBC # BLD AUTO: 25.4 10E9/L (ref 4–11)
WBC #/AREA URNS AUTO: 57 /HPF (ref 0–5)

## 2020-07-20 PROCEDURE — 83690 ASSAY OF LIPASE: CPT | Performed by: EMERGENCY MEDICINE

## 2020-07-20 PROCEDURE — 99285 EMERGENCY DEPT VISIT HI MDM: CPT | Mod: 25 | Performed by: INTERNAL MEDICINE

## 2020-07-20 PROCEDURE — 85652 RBC SED RATE AUTOMATED: CPT | Performed by: EMERGENCY MEDICINE

## 2020-07-20 PROCEDURE — 96366 THER/PROPH/DIAG IV INF ADDON: CPT | Performed by: INTERNAL MEDICINE

## 2020-07-20 PROCEDURE — 25000128 H RX IP 250 OP 636: Performed by: STUDENT IN AN ORGANIZED HEALTH CARE EDUCATION/TRAINING PROGRAM

## 2020-07-20 PROCEDURE — 96367 TX/PROPH/DG ADDL SEQ IV INF: CPT | Performed by: INTERNAL MEDICINE

## 2020-07-20 PROCEDURE — 96376 TX/PRO/DX INJ SAME DRUG ADON: CPT | Performed by: INTERNAL MEDICINE

## 2020-07-20 PROCEDURE — 85652 RBC SED RATE AUTOMATED: CPT | Performed by: INTERNAL MEDICINE

## 2020-07-20 PROCEDURE — 85025 COMPLETE CBC W/AUTO DIFF WBC: CPT | Performed by: EMERGENCY MEDICINE

## 2020-07-20 PROCEDURE — 80053 COMPREHEN METABOLIC PANEL: CPT | Performed by: EMERGENCY MEDICINE

## 2020-07-20 PROCEDURE — 96375 TX/PRO/DX INJ NEW DRUG ADDON: CPT | Performed by: INTERNAL MEDICINE

## 2020-07-20 PROCEDURE — 25000128 H RX IP 250 OP 636: Performed by: PHYSICIAN ASSISTANT

## 2020-07-20 PROCEDURE — 25800030 ZZH RX IP 258 OP 636: Performed by: PHYSICIAN ASSISTANT

## 2020-07-20 PROCEDURE — 85049 AUTOMATED PLATELET COUNT: CPT | Performed by: EMERGENCY MEDICINE

## 2020-07-20 PROCEDURE — 12000012 ZZH R&B MS OVERFLOW UMMC

## 2020-07-20 PROCEDURE — 85610 PROTHROMBIN TIME: CPT | Performed by: INTERNAL MEDICINE

## 2020-07-20 PROCEDURE — 82565 ASSAY OF CREATININE: CPT | Performed by: EMERGENCY MEDICINE

## 2020-07-20 PROCEDURE — 81001 URINALYSIS AUTO W/SCOPE: CPT | Performed by: INTERNAL MEDICINE

## 2020-07-20 PROCEDURE — 97803 MED NUTRITION INDIV SUBSEQ: CPT | Mod: GT,ZF | Performed by: DIETITIAN, REGISTERED

## 2020-07-20 PROCEDURE — 86140 C-REACTIVE PROTEIN: CPT | Performed by: EMERGENCY MEDICINE

## 2020-07-20 PROCEDURE — 96365 THER/PROPH/DIAG IV INF INIT: CPT | Mod: 59 | Performed by: INTERNAL MEDICINE

## 2020-07-20 PROCEDURE — 87086 URINE CULTURE/COLONY COUNT: CPT | Performed by: HOSPITALIST

## 2020-07-20 PROCEDURE — 99222 1ST HOSP IP/OBS MODERATE 55: CPT | Mod: AI | Performed by: HOSPITALIST

## 2020-07-20 PROCEDURE — 25000132 ZZH RX MED GY IP 250 OP 250 PS 637: Mod: GY | Performed by: PHYSICIAN ASSISTANT

## 2020-07-20 PROCEDURE — 99285 EMERGENCY DEPT VISIT HI MDM: CPT | Mod: Z6 | Performed by: INTERNAL MEDICINE

## 2020-07-20 PROCEDURE — 25000128 H RX IP 250 OP 636: Performed by: INTERNAL MEDICINE

## 2020-07-20 PROCEDURE — 74177 CT ABD & PELVIS W/CONTRAST: CPT

## 2020-07-20 PROCEDURE — 83605 ASSAY OF LACTIC ACID: CPT | Performed by: INTERNAL MEDICINE

## 2020-07-20 PROCEDURE — 36415 COLL VENOUS BLD VENIPUNCTURE: CPT | Performed by: EMERGENCY MEDICINE

## 2020-07-20 RX ORDER — PROCHLORPERAZINE MALEATE 5 MG
10 TABLET ORAL EVERY 6 HOURS PRN
Status: DISCONTINUED | OUTPATIENT
Start: 2020-07-20 | End: 2020-07-29 | Stop reason: HOSPADM

## 2020-07-20 RX ORDER — MECLIZINE HYDROCHLORIDE 25 MG/1
25 TABLET ORAL 3 TIMES DAILY PRN
Status: DISCONTINUED | OUTPATIENT
Start: 2020-07-20 | End: 2020-07-20

## 2020-07-20 RX ORDER — SODIUM CHLORIDE 9 MG/ML
INJECTION, SOLUTION INTRAVENOUS CONTINUOUS
Status: ACTIVE | OUTPATIENT
Start: 2020-07-20 | End: 2020-07-21

## 2020-07-20 RX ORDER — CITALOPRAM HYDROBROMIDE 20 MG/1
20 TABLET ORAL EVERY MORNING
Status: DISCONTINUED | OUTPATIENT
Start: 2020-07-21 | End: 2020-07-29 | Stop reason: HOSPADM

## 2020-07-20 RX ORDER — OXYCODONE HYDROCHLORIDE 5 MG/1
5 TABLET ORAL
Status: DISCONTINUED | OUTPATIENT
Start: 2020-07-20 | End: 2020-07-20

## 2020-07-20 RX ORDER — HYDROMORPHONE HYDROCHLORIDE 1 MG/ML
0.5 INJECTION, SOLUTION INTRAMUSCULAR; INTRAVENOUS; SUBCUTANEOUS ONCE
Status: COMPLETED | OUTPATIENT
Start: 2020-07-20 | End: 2020-07-20

## 2020-07-20 RX ORDER — MULTIPLE VITAMINS W/ MINERALS TAB 9MG-400MCG
1 TAB ORAL DAILY
Status: DISCONTINUED | OUTPATIENT
Start: 2020-07-21 | End: 2020-07-29 | Stop reason: HOSPADM

## 2020-07-20 RX ORDER — LIDOCAINE 40 MG/G
CREAM TOPICAL
Status: DISCONTINUED | OUTPATIENT
Start: 2020-07-20 | End: 2020-07-27

## 2020-07-20 RX ORDER — ONDANSETRON 4 MG/1
4 TABLET, ORALLY DISINTEGRATING ORAL EVERY 6 HOURS PRN
Status: DISCONTINUED | OUTPATIENT
Start: 2020-07-20 | End: 2020-07-29 | Stop reason: HOSPADM

## 2020-07-20 RX ORDER — CALCIUM CARBONATE 500 MG/1
1000 TABLET, CHEWABLE ORAL 4 TIMES DAILY PRN
Status: DISCONTINUED | OUTPATIENT
Start: 2020-07-20 | End: 2020-07-29 | Stop reason: HOSPADM

## 2020-07-20 RX ORDER — PANTOPRAZOLE SODIUM 40 MG/1
40 TABLET, DELAYED RELEASE ORAL 2 TIMES DAILY
Status: DISCONTINUED | OUTPATIENT
Start: 2020-07-20 | End: 2020-07-29 | Stop reason: HOSPADM

## 2020-07-20 RX ORDER — HYDROMORPHONE HYDROCHLORIDE 1 MG/ML
0.5 INJECTION, SOLUTION INTRAMUSCULAR; INTRAVENOUS; SUBCUTANEOUS
Status: DISCONTINUED | OUTPATIENT
Start: 2020-07-20 | End: 2020-07-20

## 2020-07-20 RX ORDER — HYDROMORPHONE HYDROCHLORIDE 2 MG/1
2 TABLET ORAL
Status: DISCONTINUED | OUTPATIENT
Start: 2020-07-20 | End: 2020-07-24

## 2020-07-20 RX ORDER — LEVOFLOXACIN 5 MG/ML
500 INJECTION, SOLUTION INTRAVENOUS ONCE
Status: COMPLETED | OUTPATIENT
Start: 2020-07-20 | End: 2020-07-20

## 2020-07-20 RX ORDER — ONDANSETRON 2 MG/ML
4 INJECTION INTRAMUSCULAR; INTRAVENOUS EVERY 6 HOURS PRN
Status: DISCONTINUED | OUTPATIENT
Start: 2020-07-20 | End: 2020-07-29 | Stop reason: HOSPADM

## 2020-07-20 RX ORDER — ACETAMINOPHEN 325 MG/1
650 TABLET ORAL EVERY 4 HOURS PRN
Status: DISCONTINUED | OUTPATIENT
Start: 2020-07-20 | End: 2020-07-20

## 2020-07-20 RX ORDER — TRAMADOL HYDROCHLORIDE 50 MG/1
50 TABLET ORAL EVERY 6 HOURS PRN
COMMUNITY
End: 2020-09-24

## 2020-07-20 RX ORDER — DOXEPIN HYDROCHLORIDE 10 MG/ML
10 SOLUTION ORAL 2 TIMES DAILY
Status: DISCONTINUED | OUTPATIENT
Start: 2020-07-20 | End: 2020-07-21

## 2020-07-20 RX ORDER — POTASSIUM CHLORIDE 750 MG/1
20 TABLET, EXTENDED RELEASE ORAL DAILY
Status: DISCONTINUED | OUTPATIENT
Start: 2020-07-20 | End: 2020-07-29 | Stop reason: HOSPADM

## 2020-07-20 RX ORDER — IOPAMIDOL 755 MG/ML
108 INJECTION, SOLUTION INTRAVASCULAR ONCE
Status: COMPLETED | OUTPATIENT
Start: 2020-07-20 | End: 2020-07-20

## 2020-07-20 RX ORDER — PROCHLORPERAZINE 25 MG
25 SUPPOSITORY, RECTAL RECTAL EVERY 12 HOURS PRN
Status: DISCONTINUED | OUTPATIENT
Start: 2020-07-20 | End: 2020-07-29 | Stop reason: HOSPADM

## 2020-07-20 RX ORDER — LORAZEPAM 0.5 MG/1
0.5 TABLET ORAL EVERY 4 HOURS PRN
Status: DISCONTINUED | OUTPATIENT
Start: 2020-07-20 | End: 2020-07-29 | Stop reason: HOSPADM

## 2020-07-20 RX ORDER — HYDROMORPHONE HYDROCHLORIDE 1 MG/ML
1 INJECTION, SOLUTION INTRAMUSCULAR; INTRAVENOUS; SUBCUTANEOUS EVERY 4 HOURS PRN
Status: DISCONTINUED | OUTPATIENT
Start: 2020-07-20 | End: 2020-07-29 | Stop reason: HOSPADM

## 2020-07-20 RX ORDER — HYDROXYZINE HYDROCHLORIDE 10 MG/1
10 TABLET, FILM COATED ORAL 3 TIMES DAILY PRN
Status: DISCONTINUED | OUTPATIENT
Start: 2020-07-20 | End: 2020-07-20

## 2020-07-20 RX ORDER — FAMOTIDINE 20 MG/1
20 TABLET, FILM COATED ORAL 2 TIMES DAILY
Status: DISCONTINUED | OUTPATIENT
Start: 2020-07-20 | End: 2020-07-28

## 2020-07-20 RX ORDER — OLANZAPINE 5 MG/1
5 TABLET, ORALLY DISINTEGRATING ORAL AT BEDTIME
Status: DISCONTINUED | OUTPATIENT
Start: 2020-07-20 | End: 2020-07-20

## 2020-07-20 RX ORDER — SIMETHICONE 80 MG
80 TABLET,CHEWABLE ORAL EVERY 6 HOURS PRN
Status: DISCONTINUED | OUTPATIENT
Start: 2020-07-20 | End: 2020-07-29 | Stop reason: HOSPADM

## 2020-07-20 RX ORDER — LOPERAMIDE HCL 2 MG
2 CAPSULE ORAL 4 TIMES DAILY PRN
Status: DISCONTINUED | OUTPATIENT
Start: 2020-07-20 | End: 2020-07-29 | Stop reason: HOSPADM

## 2020-07-20 RX ORDER — FLUTICASONE PROPIONATE 50 MCG
2 SPRAY, SUSPENSION (ML) NASAL DAILY
Status: DISCONTINUED | OUTPATIENT
Start: 2020-07-20 | End: 2020-07-20

## 2020-07-20 RX ORDER — NALOXONE HYDROCHLORIDE 0.4 MG/ML
.1-.4 INJECTION, SOLUTION INTRAMUSCULAR; INTRAVENOUS; SUBCUTANEOUS
Status: DISCONTINUED | OUTPATIENT
Start: 2020-07-20 | End: 2020-07-29 | Stop reason: HOSPADM

## 2020-07-20 RX ORDER — SUMATRIPTAN 100 MG/1
100 TABLET, FILM COATED ORAL DAILY PRN
Status: DISCONTINUED | OUTPATIENT
Start: 2020-07-20 | End: 2020-07-20

## 2020-07-20 RX ADMIN — POTASSIUM CHLORIDE 20 MEQ: 750 TABLET, EXTENDED RELEASE ORAL at 20:07

## 2020-07-20 RX ADMIN — LEVOFLOXACIN 500 MG: 5 INJECTION, SOLUTION INTRAVENOUS at 18:59

## 2020-07-20 RX ADMIN — HYDROMORPHONE HYDROCHLORIDE 0.5 MG: 1 INJECTION, SOLUTION INTRAMUSCULAR; INTRAVENOUS; SUBCUTANEOUS at 17:46

## 2020-07-20 RX ADMIN — HYDROMORPHONE HYDROCHLORIDE 2 MG: 2 TABLET ORAL at 20:07

## 2020-07-20 RX ADMIN — HYDROMORPHONE HYDROCHLORIDE 1 MG: 1 INJECTION, SOLUTION INTRAMUSCULAR; INTRAVENOUS; SUBCUTANEOUS at 19:30

## 2020-07-20 RX ADMIN — HYDROMORPHONE HYDROCHLORIDE 2 MG: 2 TABLET ORAL at 22:57

## 2020-07-20 RX ADMIN — PANTOPRAZOLE SODIUM 40 MG: 40 TABLET, DELAYED RELEASE ORAL at 20:07

## 2020-07-20 RX ADMIN — HYDROMORPHONE HYDROCHLORIDE 0.5 MG: 1 INJECTION, SOLUTION INTRAMUSCULAR; INTRAVENOUS; SUBCUTANEOUS at 15:54

## 2020-07-20 RX ADMIN — CEFEPIME HYDROCHLORIDE 2 G: 2 INJECTION, POWDER, FOR SOLUTION INTRAVENOUS at 22:56

## 2020-07-20 RX ADMIN — METRONIDAZOLE 500 MG: 500 INJECTION, SOLUTION INTRAVENOUS at 17:46

## 2020-07-20 RX ADMIN — SODIUM CHLORIDE: 9 INJECTION, SOLUTION INTRAVENOUS at 20:07

## 2020-07-20 RX ADMIN — FAMOTIDINE 20 MG: 20 TABLET ORAL at 20:07

## 2020-07-20 RX ADMIN — IOPAMIDOL 108 ML: 755 INJECTION, SOLUTION INTRAVENOUS at 16:20

## 2020-07-20 ASSESSMENT — ENCOUNTER SYMPTOMS
FEVER: 0
SHORTNESS OF BREATH: 0
ARTHRALGIAS: 0
NECK STIFFNESS: 0
EYE REDNESS: 0
CONFUSION: 0
HEADACHES: 0
ABDOMINAL PAIN: 1
NAUSEA: 1
DIFFICULTY URINATING: 0
COLOR CHANGE: 0

## 2020-07-20 NOTE — PROGRESS NOTES
"Nathalie Bashir is a 54 year old female who is being evaluated via a billable telephone visit.      The patient has been notified of following:     \"This telephone visit will be conducted via a call between you and your physician/provider. We have found that certain health care needs can be provided without the need for a physical exam.  This service lets us provide the care you need with a short phone conversation.  If a prescription is necessary we can send it directly to your pharmacy.  If lab work is needed we can place an order for that and you can then stop by our lab to have the test done at a later time.    Telephone visits are billed at different rates depending on your insurance coverage. During this emergency period, for some insurers they may be billed the same as an in-person visit.  Please reach out to your insurance provider with any questions.    If during the course of the call the physician/provider feels a telephone visit is not appropriate, you will not be charged for this service.\"    Patient has given verbal consent for Telephone visit?  Yes    CLINICAL NUTRITION SERVICES - REASSESSMENT NOTE   EVALUATION OF PREVIOUS PLAN OF CARE:   Referring Physician: Micki 5/18; Mckenna Abebe PAC for follow-up  Time spent with patient: 30 minutes.    Factors affecting nutrition intake include: abdominal pain with eating; N/V with chemo cycle for several days then subsies  Current diet: general, Gluten free  Current appetite/intake: fair  Chemotherapy: Taxotere, 5FU, Oxaliplatin   Surgical history: Whipple 1/28/20    Monitoring from previous assessment:   -Food intake - decreased from abdominal pain.  She tells me that she had 2 eggs this morning for breakfast and now has been having abdominal pain. She denies upset stomach versus pain with eating.  She as been eating all types of foods and textures and cannot associate certain foods with increased pain.  Her pain is present frequently, not only with eating. "   -Fluid/beverage intake - focused on drinking at least 8 cups non-caff containing beverages (water, gatorade and koolaid).    -Liquid meal replacement or supplement - taking CIB 3-4 times/week, not daily.  Willing to take more if necessary.    -Weight trends - down ~10 lb x past 1 month (previous RD visit) = 6% body wt  Wt Readings from Last 10 Encounters:   07/15/20 79.4 kg (175 lb)   07/08/20 75.7 kg (166 lb 14.4 oz)   07/08/20 75.7 kg (166 lb 14.4 oz)   06/25/20 79 kg (174 lb 1.6 oz)   06/24/20 78.9 kg (174 lb)   06/24/20 79 kg (174 lb 1.6 oz)   06/17/20 81 kg (178 lb 9.6 oz)   06/10/20 79.7 kg (175 lb 12.8 oz)   06/10/20 81.6 kg (180 lb)   05/27/20 83.6 kg (184 lb 3.2 oz)     Previous Goals:   1.  Aim for 5-6 small frequent meals - goal not met  2.  Aim for 2000kcal and 85g protein/day  - goal not met  3. Aim for 10 cups non-caffeine containing beverages daily   goal not met  4. Weight maintenance  -  goal not met  Evaluation: Not met   Previous Nutrition Diagnosis:   Inadequate oral intake related to decreased appetite with nausea, some vomiting as evidenced by pt report taking smaller portions or skipping meals.   Evaluation: Declining   NEW FINDINGS:   Ongoing weight loss   CURRENT NUTRITION DIAGNOSIS   Inadequate protein-energy intake related to decreased appetite with abdominal pain as evidenced by 6% wt loss x past 1 month   INTERVENTIONS   Recommendations / Nutrition Prescription   1.  2000kcal, 85g protein/day  2. Small frequent meals, start supplementing with 2 ONS daily (Boost Plus, Ensure Plus et)  Implementation  General/healthful diet and Medical Food Supplement - reviewed calorie and protein needs.  Encouraged Nathalie to aim for at least 2000kcal, 85g protein/day via small, frequent meals.  Encouraged to track intake to help ensure meets her goals.   Reviewed ONS.  Encouraged to start consistently having two ONS daily.   Goals  1.  Aim for 5-6 small frequent meals - taking at least two ONS  daily  2.  Aim for 2000kcal and 85g protein/day  3. Aim for 10 cups non-caffeine containing beverages daily   4. Weight maintenance         Follow-Up Plans: Pt has RD contact information for questions.       Omaira Hart RDN, LD

## 2020-07-20 NOTE — ED NOTES
[facility], [place]   ED Nurse to Floor Handoff     Nathalie Bashir is a 54 year old female who speaks English and lives with family members,  in a home  They arrived in the ED by car from home    ED Chief Complaint: Abdominal Pain    ED Dx;   Final diagnoses:   Cholangitis   Malignant neoplasm of stomach, unspecified location (H)         Needed?: No    Allergies:   Allergies   Allergen Reactions     Gluten Meal Palpitations     Augmentin Rash     Oxycontin [Oxycodone]      Confusion, loopy, oxycodone is tolerated     Pregabalin      interfered with Cymbalta     Topiramate      Tongue numbness, taste alteration, irritable      Acetaminophen Nausea     Urine retention       Dust Mite Extract      Gabapentin Dizziness and GI Disturbance     Latex Rash     Methadone Fatigue     Mold      Naprosyn [Naproxen] Dizziness and GI Disturbance     Oxymetholone      Seasonal Allergies    .  Past Medical Hx:   Past Medical History:   Diagnosis Date     Allergic rhinitis      Cancer (H)     stomach cancer     Depression      Lumbago      Migraine      Other chronic pain     low back     Sacroiliitis (H)     low back pain     Trochanteric bursitis     leg length discrrepancy, hip pain      Baseline Mental status: WDL  Current Mental Status changes: at basesline    Infection present or suspected this encounter: yes, abx infusing, WBC elevated  Sepsis suspected: No  Isolation type: No active isolations     Activity level - Baseline/Home:  Independent  Activity Level - Current:   Stand with Assist    Bariatric equipment needed?: No    In the ED these meds were given:   Medications   levofloxacin (LEVAQUIN) infusion 500 mg (has no administration in time range)   metroNIDAZOLE (FLAGYL) infusion 500 mg (500 mg Intravenous New Bag 7/20/20 7313)   HYDROmorphone (PF) (DILAUDID) injection 0.5 mg (0.5 mg Intravenous Given 7/20/20 1624)   sodium chloride (PF) 0.9% PF flush 77 mL (77 mLs Intravenous Given  7/20/20 1620)   iopamidol (ISOVUE-370) solution 108 mL (108 mLs Intravenous Given 7/20/20 1620)   HYDROmorphone (PF) (DILAUDID) injection 0.5 mg (0.5 mg Intravenous Given 7/20/20 1746)       Drips running?  Yes    Home pump  No    Current LDAs  Port A Cath Single 03/26/20 Right Chest wall (Active)   Access Date 07/20/20 07/20/20 1512   Access Attempts 1 07/20/20 1512   Gauge 20 gauge;Power noncoring 90 degree bend 07/20/20 1512   Site Assessment WDL 07/20/20 1512   Line Status Blood return noted;Saline locked 07/20/20 1512   Dressing Intervention Transparent 07/20/20 1512   Number of days: 116       Incision/Surgical Site 01/28/20 Abdomen (Active)   Number of days: 174       Incision/Surgical Site 03/26/20 Right;Upper Chest (Active)   Number of days: 116       Labs results:   Labs Ordered and Resulted from Time of ED Arrival Up to the Time of Departure from the ED   CBC WITH PLATELETS DIFFERENTIAL - Abnormal; Notable for the following components:       Result Value    WBC 25.4 (*)     RBC Count 3.26 (*)     Hemoglobin 8.8 (*)     Hematocrit 28.8 (*)     MCHC 30.6 (*)     RDW 22.9 (*)     Platelet Count 102 (*)     Absolute Neutrophil 19.4 (*)     Absolute Metamyelocytes 0.7 (*)     Absolute Myelocytes 2.9 (*)     All other components within normal limits   COMPREHENSIVE METABOLIC PANEL - Abnormal; Notable for the following components:    Calcium 7.7 (*)     Albumin 2.2 (*)     Protein Total 5.2 (*)     Alkaline Phosphatase 267 (*)     AST 60 (*)     All other components within normal limits   INR - Abnormal; Notable for the following components:    INR 1.20 (*)     All other components within normal limits   UA MACROSCOPIC WITH REFLEX TO MICRO AND CULTURE - Abnormal; Notable for the following components:    Blood Urine Trace (*)     Protein Albumin Urine 30 (*)     Leukocyte Esterase Urine Large (*)     RBC Urine 5 (*)     WBC Urine 57 (*)     Squamous Epithelial /HPF Urine 16 (*)     Transitional Epi 2 (*)     All  other components within normal limits   CRP INFLAMMATION - Abnormal; Notable for the following components:    CRP Inflammation 11.0 (*)     All other components within normal limits   LIPASE - Abnormal; Notable for the following components:    Lipase 31 (*)     All other components within normal limits   LACTIC ACID WHOLE BLOOD   ERYTHROCYTE SEDIMENTATION RATE AUTO       Imaging Studies:   Recent Results (from the past 24 hour(s))   CT Abdomen Pelvis w Contrast    Narrative    EXAMINATION: CT ABDOMEN PELVIS W CONTRAST, 7/20/2020 4:34 PM    TECHNIQUE:  Helical CT images from the lung bases through the  symphysis pubis were obtained with IV contrast. Contrast dose:  iopamidol (ISOVUE-370) solution 108 mL    COMPARISON: PET CT 6/3/2020. CT abdomen 4/13/2020, 3:30 1/20/2018,  2/10/2028, 2/4/2020.    HISTORY: Abd pain, acute, generalized    FINDINGS:    ABDOMEN:  LIVER: Heterogeneous attenuation of the liver parenchyma. No focal  hepatic lesion. Small amount of free fluid around the liver.  BILE DUCTS: No intrahepatic or extrahepatic biliary dilation. There is  mild enhancement of and inflammation surrounding the bile duct  proximal to the choledochojejunostomy, this is increased compared to  prior examinations.  GALLBLADDER: Surgically absent.  PANCREAS: Post surgical changes of Whipple procedure. Decreased main  pancreatic ductal dilation. Decreased inflammatory changes surrounding  the region of the pancreaticojejunostomy site.  SPLEEN: Within normal limits  ADRENALS: within normal limits.  KIDNEYS: No hydronephrosis. No nephrolithiasis. There is thickening of  Gerota's fascia on the right.    PELVIS:   REPRODUCTIVE ORGANS: There is a fluid collection posterior right  lateral of the uterus measuring 3.2 x 4.5 cm, there does appear to be  with slight thickened and enhancing wall along the periphery.  Difficult to discern if this is simple fluid collection/ascites as  there is significant streak artifact from left hip  arthroplasty  limiting evaluation.  URETERS: within normal limits.  BLADDER: Partially decompressed.    BOWEL: Postoperative changes from Whipple. No dilated loops of small  or large bowel.There is enhancement and is mild thickened appearance  of the jejunum, this may be secondary to lack of distention or could  be inflammatory in nature. Additionally, there is a thickened  appearance of the sigmoid and proximal transverse colon, this again  could be secondary to lack of full distention versus inflammatory  changes. Normal appendix. Sigmoid diverticulosis.  MESENTERIC LYMPH NODES: No enlarged mesenteric lymph nodes.   PERITONEUM: Small volume ascites particularly around the periphery of  the liver and within the pelvis. No free air within the abdomen or  pelvis. Nodular soft tissue thickening in the right inguinal region  (series 5, image 350) measuring 3.8 x 2.1 cm additional area of  nodularity measuring 2.2 x 2.0 cm (series 5, image 174) along the  right aspect of the abdominal wall at the level of the right kidney,  stable compared to prior.  VESSELS/lymph nodes: Pelvic phleboliths. The major vasculature of the  abdomen and pelvis is patent. Mild aortobiiliac atheromatous plaque.  No adenopathy.  RETROPERITONEUM: within normal limits.  ABDOMINAL WALL: Soft tissue thickening along the anterior midline  abdominal scar. Abdominal wall is otherwise within normal limits.  BONES: Postoperative changes of total left hip arthroplasty.Fixation  screw and fusion of the left sacroiliac joint. No suspicious osseous  lesions. Multilevel degenerative changes of visualized lumbar spine.    LUNG BASES: Clear.      Impression    IMPRESSION:   1. Thickening and enhancement of the bile duct proximal to the  choledochojejunostomy with surrounding inflammatory change, this is  concerning for cholangitis.  2. There is a 3.2 x 4.5 cm fluid collection in the pelvis posterior to  the uterus, close evaluation is limited secondary to  significant  streak artifact from left hip arthroplasty. This could represent small  volume ascites, however cannot rule out infectious etiology.  3. Subtle thickening and enhancement of portions of the jejunum,  sigmoid colon, and proximal transverse colon, this may be secondary to  lack of full distention however mild enterocolitis cannot be ruled  out.  2. Stable soft tissue nodularity along the right abdominal wall and  right inguinal region, this is favored to be secondary to fat necrosis  from prior fluid collections.  3. Decreased main pancreatic ductal dilation.  4. Subtle hazy appearance of the mesentery, this is presumed to be  reactive in nature.       Recent vital signs:   /52   Pulse 99   Temp 98.5  F (36.9  C) (Oral)   Resp 12   Wt 79.4 kg (175 lb)   LMP 09/23/2014   SpO2 99%   BMI 25.83 kg/m      Sujit Coma Scale Score: 15 (07/20/20 1512)       Cardiac Rhythm: Normal Sinus  Pt needs tele? No  Skin/wound Issues: None    Code Status: Full Code    Pain control: fair    Nausea control: good    Abnormal labs/tests/findings requiring intervention: see results    Family present during ED course? No   Family Comments/Social Situation comments:     Tasks needing completion: None    Jeffrey Dutta, RN  [phone] [facility]

## 2020-07-20 NOTE — LETTER
7/20/2020         RE: Nathalie Bashir  [address] [id number]        Dear Colleague,    Thank you for referring your patient, Nathalie Bashir, to the [facility]. Please see a copy of my visit note below.    Nathalie Bashir is a 54 year old female who is being evaluated via a billable telephone visit.          CLINICAL NUTRITION SERVICES - REASSESSMENT NOTE   EVALUATION OF PREVIOUS PLAN OF CARE:   Referring Physician: Micki 5/18; Mckenna Abebe PAC for follow-up  Time spent with patient: 30 minutes.    Factors affecting nutrition intake include: abdominal pain with eating; N/V with chemo cycle for several days then subsies  Current diet: general, Gluten free  Current appetite/intake: fair  Chemotherapy: Taxotere, 5FU, Oxaliplatin   Surgical history: Whipple 1/28/20    Monitoring from previous assessment:   -Food intake - decreased from abdominal pain.  She tells me that she had 2 eggs this morning for breakfast and now has been having abdominal pain. She denies upset stomach versus pain with eating.  She as been eating all types of foods and textures and cannot associate certain foods with increased pain.  Her pain is present frequently, not only with eating.   -Fluid/beverage intake - focused on drinking at least 8 cups non-caff containing beverages (water, gatorade and koolaid).    -Liquid meal replacement or supplement - taking CIB 3-4 times/week, not daily.  Willing to take more if necessary.    -Weight trends - down ~10 lb x past 1 month (previous RD visit) = 6% body wt  Wt Readings from Last 10 Encounters:   07/15/20 79.4 kg (175 lb)   07/08/20 75.7 kg (166 lb 14.4 oz)   07/08/20 75.7 kg (166 lb 14.4 oz)   06/25/20 79 kg (174 lb 1.6 oz)   06/24/20 78.9 kg (174 lb)   06/24/20 79 kg (174 lb 1.6 oz)   06/17/20 81 kg (178 lb 9.6 oz)   06/10/20 79.7 kg (175 lb 12.8 oz)   06/10/20 81.6 kg (180 lb)   05/27/20 83.6 kg (184 lb 3.2 oz)     Previous Goals:   1.  Aim for 5-6 small frequent  meals - goal not met  2.  Aim for 2000kcal and 85g protein/day  - goal not met  3. Aim for 10 cups non-caffeine containing beverages daily   goal not met  4. Weight maintenance  -  goal not met  Evaluation: Not met   Previous Nutrition Diagnosis:   Inadequate oral intake related to decreased appetite with nausea, some vomiting as evidenced by pt report taking smaller portions or skipping meals.   Evaluation: Declining   NEW FINDINGS:   Ongoing weight loss   CURRENT NUTRITION DIAGNOSIS   Inadequate protein-energy intake related to decreased appetite with abdominal pain as evidenced by 6% wt loss x past 1 month   INTERVENTIONS   Recommendations / Nutrition Prescription   1.  2000kcal, 85g protein/day  2. Small frequent meals, start supplementing with 2 ONS daily (Boost Plus, Ensure Plus et)  Implementation  General/healthful diet and Medical Food Supplement - reviewed calorie and protein needs.  Encouraged Nathalie to aim for at least 2000kcal, 85g protein/day via small, frequent meals.  Encouraged to track intake to help ensure meets her goals.   Reviewed ONS.  Encouraged to start consistently having two ONS daily.   Goals  1.  Aim for 5-6 small frequent meals - taking at least two ONS daily  2.  Aim for 2000kcal and 85g protein/day  3. Aim for 10 cups non-caffeine containing beverages daily   4. Weight maintenance         Follow-Up Plans: Pt has RD contact information for questions.       Omaira Hart RDN, LD

## 2020-07-20 NOTE — ED PROVIDER NOTES
[place] EMERGENCY DEPARTMENT ([facility])  July 20, 2020  ED 26  3:08 PM   History     Chief Complaint   Patient presents with     Abdominal Pain     The history is provided by the patient and medical records.     Nathalie Bashir is a 54 year old female with history of gastric adenocarcinoma on chemo (Taxotere, 5-FU, oxaliplatinum), Whipple procedure, and cholecystectomy who presents with abdominal pain that started yesterday.  Patient states that she has had abdominal pain over the past year and had work-up that showed a pancreatic head cyst.  This was concerning for an intraductal papillary mucinous neoplasm and so she underwent a Whipple procedure on 1/28/2020.  During procedure they found her gastric adenocarcinoma and she was started on chemotherapy for this.  Patient has had intermittent problems with nausea, this is usually managed with 8 mg of Zofran in the mornings.  She developed a new abdominal pain yesterday that is nonradiating and focal in her mid abdomen.  This abdominal pain worsens with walking.  She took all her medications today but continued to have abdominal pain.  She has had some difficulty maintaining hydration with water but has been able to keep her meds down okay. Patient's most recent round of chemo was last week, has one more round in near future.  She states that she usually tolerates chemotherapy well.  Continues to have nausea at this time.    PAST MEDICAL HISTORY:   Past Medical History:   Diagnosis Date     Allergic rhinitis      Cancer (H)     stomach cancer     Depression      Lumbago      Migraine      Other chronic pain     low back     Sacroiliitis (H)     low back pain     Trochanteric bursitis     leg length discrrepancy, hip pain       PAST SURGICAL HISTORY:   Past Surgical History:   Procedure Laterality Date     ARTHROPLASTY HIP Left 2016     BACK SURGERY      L4-5 decompression     C REPAIR DETACH RETINA,SCLERAL BUCKLE       CATARACT IOL, RT/LT        CHOLECYSTECTOMY N/A 2020    Procedure: Cholecystectomy;  Surgeon: Yandel Mallory MD;  Location: [facility]     ESOPHAGOSCOPY, GASTROSCOPY, DUODENOSCOPY (EGD), COMBINED N/A 3/3/2020    Procedure: ESOPHAGOGASTRODUODENOSCOPY, WITH ENDOSCOPIC US (enoxaparin);  Surgeon: Bakari Plata MD;  [facility]     EYE SURGERY       IR CHEST PORT PLACEMENT > 5 YRS OF AGE  3/26/2020     OPEN REDUCTION INTERNAL FIXATION RODDING INTRAMEDULLARY FEMUR Left 2014    Procedure: OPEN REDUCTION INTERNAL FIXATION RODDING INTRAMEDULLARY FEMUR;  Surgeon: Arnol Santiago MD;  Location: [facility] OR     ORTHOPEDIC SURGERY       PICC DOUBLE LUMEN PLACEMENT Right 2020    5 fr double lumen 41 cm     REMOVE HARDWARE LOWER EXTREMITY Left 2015    Procedure: REMOVE HARDWARE LOWER EXTREMITY;  Surgeon: Arnol Santiago MD;  Location: [facility]     REMOVE HARDWARE RODDING INTRAMEDULLARY FEMUR Left 2015    Procedure: REMOVE HARDWARE RODDING INTRAMEDULLARY FEMUR;  Surgeon: Arnol Santiago MD;  [facility]     RESECT BONE LOWER EXTREMITY Left 2014    Procedure: RESECT BONE LOWER EXTREMITY;  Surgeon: Arnol Santiago MD;  Location[facility]     WHIPPLE PROCEDURE N/A 2020    Procedure: Open Whipple procedure and Cholecystectomy;  Surgeon: Yandel Mallory MD;  [facility]       Past medical history, past surgical history, medications, and allergies were reviewed with the patient. Additional pertinent items: None    FAMILY HISTORY:   Family History   Problem Relation Age of Onset     Heart Failure Mother 77     Anesthesia Reaction No family hx of      Deep Vein Thrombosis No family hx of        SOCIAL HISTORY:   Social History     Tobacco Use     Smoking status: Former Smoker     Packs/day: 0.50     Years: 25.00     Pack years: 12.50     Types: Cigarettes     Last attempt to quit: 2020     Years since quittin.5     Smokeless tobacco: Never Used   Substance Use Topics     Alcohol use: No     Social history  was reviewed with the patient. Additional pertinent items: None      Patient's Medications   New Prescriptions    No medications on file   Previous Medications    AMITRIPTYLINE (ELAVIL) 25 MG TABLET    Take 3 tablets by mouth At Bedtime    CITALOPRAM (CELEXA) 20 MG TABLET    Take 20 mg by mouth every morning     DEXAMETHASONE (DECADRON) 4 MG TABLET    Take 1 tablet (4mg) the day before, day of, and 2 days after chemotherapy    DICLOFENAC (FLECTOR) 1.3 % PATCH    Place 1 patch onto the skin daily as needed     FAMOTIDINE (PEPCID) 20 MG TABLET    Take 1 tablet (20 mg) by mouth 2 times daily    LOPERAMIDE (IMODIUM) 2 MG CAPSULE    Take 4 mg by mouth 4 times daily as needed for diarrhea     LORAZEPAM (ATIVAN) 0.5 MG TABLET    Take 1 tablet (0.5 mg) by mouth every 4 hours as needed (Anxiety, Nausea/Vomiting or Sleep)    MECLIZINE (ANTIVERT) 25 MG TABLET    Take 1 tablet (25 mg) by mouth 3 times daily as needed for dizziness    NALOXONE (NARCAN) 4 MG/0.1ML NASAL SPRAY    Spray 1 spray (4 mg) into one nostril alternating nostrils once as needed for opioid reversal every 2-3 minutes until assistance arrives    ONDANSETRON (ZOFRAN) 8 MG TABLET    Take 1 tablet (8 mg) by mouth every 8 hours as needed (Nausea/Vomiting)    OXYCODONE (ROXICODONE) 5 MG TABLET    Take 1 tablet (5 mg) by mouth 3 times daily as needed for pain (. Stop tramadol. Cannot take with tramadol.)    PANTOPRAZOLE (PROTONIX) 40 MG EC TABLET    TAKE ONE TABLET BY MOUTH TWICE A DAY    POTASSIUM CHLORIDE ER (KLOR-CON M) 20 MEQ CR TABLET    Take 1 tablet (20 mEq) by mouth daily    SIMETHICONE 80 MG TABS    Take 1 tablet by mouth every 6 hours as needed (bloating, gas, belching)    TRAMADOL (ULTRAM) 50 MG TABLET    Take 50 mg by mouth every 6 hours as needed for severe pain   Modified Medications    No medications on file   Discontinued Medications    BENZOCAINE-MENTHOL (CEPACOL) 15-3.6 MG LOZENGE    Place 1 lozenge inside cheek every hour as needed for sore  throat    CALCIUM CARB 1250 MG, 500 MG [place],/VITAMIN D 200 UNITS (OSCAL WITH D) 500-200 MG-UNIT PER TABLET    Take 1 tablet by mouth 3 times daily (with meals)    DOXEPIN (SINEQUAN) 10 MG/ML (HIGH CONC) SOLUTION    Take 1 mL (10 mg) by mouth 2 times daily    FLUTICASONE (FLONASE) 50 MCG/ACT NASAL SPRAY    Spray 2 sprays into both nostrils daily as needed     HYDROXYZINE (ATARAX) 10 MG TABLET    Take 10 mg by mouth 3 times daily as needed for itching    MAGIC MOUTHWASH SUSPENSION, DIPHENHYDRAMINE, LIDOCAINE, ALUMINUM-MAGNESIUM & SIMETHICONE, (FIRST-MOUTHWASH BLM) COMPOUNDING KIT    Swish and spit 10 mLs in mouth every 6 hours as needed for mouth sores    MULTIVITAMIN, THERAPEUTIC WITH MINERALS (MULTI-VITAMIN) TABS    Take 1 tablet by mouth daily    OLANZAPINE ZYDIS (ZYPREXA) 5 MG ODT    Take 1 tablet (5 mg) by mouth At Bedtime    ONDANSETRON (ZOFRAN-ODT) 4 MG ODT TAB    Take 1 tablet (4 mg) by mouth every 6 hours as needed for nausea or vomiting    SENNA-DOCUSATE (SENOKOT-S/PERICOLACE) 8.6-50 MG TABLET    Take 1 tablet by mouth daily as needed for constipation    SUMATRIPTAN (IMITREX) 100 MG TABLET    Take 100 mg by mouth at onset of headache for migraine     TIZANIDINE (ZANAFLEX) 4 MG TABLET    Take 1 tablet by mouth 2 times daily as needed          Allergies   Allergen Reactions     Gluten Meal Palpitations     Augmentin Rash     Oxycontin [Oxycodone]      Confusion, loopy, oxycodone is tolerated     Pregabalin      interfered with Cymbalta     Topiramate      Tongue numbness, taste alteration, irritable      Acetaminophen Nausea     Urine retention       Dust Mite Extract      Gabapentin Dizziness and GI Disturbance     Latex Rash     Methadone Fatigue     Mold      Naprosyn [Naproxen] Dizziness and GI Disturbance     Oxymetholone      Seasonal Allergies         Review of Systems   Constitutional: Negative for fever.   HENT: Negative for congestion.    Eyes: Negative for redness.   Respiratory: Negative for  shortness of breath.    Cardiovascular: Negative for chest pain.   Gastrointestinal: Positive for abdominal pain and nausea.   Genitourinary: Negative for difficulty urinating.   Musculoskeletal: Negative for arthralgias and neck stiffness.   Skin: Negative for color change.   Neurological: Negative for headaches.   Psychiatric/Behavioral: Negative for confusion.     A complete review of systems was performed with pertinent positives and negatives noted in the HPI, and all other systems negative.    Physical Exam   BP: 110/52  Pulse: 122  Temp: 98.5  F (36.9  C)  Resp: 12  Weight: 79.4 kg (175 lb)  SpO2: 97 %      Physical Exam  Constitutional:       General: She is not in acute distress.     Appearance: She is not diaphoretic.   HENT:      Head: Atraumatic.      Mouth/Throat:      Pharynx: No oropharyngeal exudate.   Eyes:      General: No scleral icterus.     Pupils: Pupils are equal, round, and reactive to light.   Neck:      Musculoskeletal: Neck supple.   Cardiovascular:      Rate and Rhythm: Normal rate and regular rhythm.      Heart sounds: Normal heart sounds. No murmur. No friction rub. No gallop.    Pulmonary:      Effort: Pulmonary effort is normal. No respiratory distress.      Breath sounds: Normal breath sounds. No stridor. No wheezing, rhonchi or rales.   Chest:      Chest wall: No tenderness.   Abdominal:      General: Bowel sounds are normal.      Palpations: Abdomen is soft.      Tenderness: There is abdominal tenderness in the epigastric area. There is no right CVA tenderness, left CVA tenderness, guarding or rebound. Negative signs include Snowden's sign, Rovsing's sign, McBurney's sign, psoas sign and obturator sign.      Hernia: No hernia is present.       Musculoskeletal:         General: No tenderness.   Skin:     General: Skin is warm.      Findings: No rash.         ED Course        Procedures               Results for orders placed or performed during the hospital encounter of 07/20/20  (from the past 24 hour(s))   CBC with platelets differential   Result Value Ref Range    WBC 25.4 (H) 4.0 - 11.0 10e9/L    RBC Count 3.26 (L) 3.8 - 5.2 10e12/L    Hemoglobin 8.8 (L) 11.7 - 15.7 g/dL    Hematocrit 28.8 (L) 35.0 - 47.0 %    MCV 88 78 - 100 fl    MCH 27.0 26.5 - 33.0 pg    MCHC 30.6 (L) 31.5 - 36.5 g/dL    RDW 22.9 (H) 10.0 - 15.0 %    Platelet Count 102 (L) 150 - 450 10e9/L    Diff Method Manual Differential     % Neutrophils 76.3 %    % Lymphocytes 7.9 %    % Monocytes 1.8 %    % Eosinophils 0.0 %    % Basophils 0.0 %    % Metamyelocytes 2.6 %    % Myelocytes 11.4 %    Absolute Neutrophil 19.4 (H) 1.6 - 8.3 10e9/L    Absolute Lymphocytes 2.0 0.8 - 5.3 10e9/L    Absolute Monocytes 0.5 0.0 - 1.3 10e9/L    Absolute Eosinophils 0.0 0.0 - 0.7 10e9/L    Absolute Basophils 0.0 0.0 - 0.2 10e9/L    Absolute Metamyelocytes 0.7 (H) 0 10e9/L    Absolute Myelocytes 2.9 (H) 0 10e9/L    Anisocytosis Marked     Poikilocytosis Slight     Teardrop Cells Slight     Ovalocytes Slight     Platelet Estimate Confirming automated cell count    Comprehensive metabolic panel   Result Value Ref Range    Sodium 139 133 - 144 mmol/L    Potassium 3.4 3.4 - 5.3 mmol/L    Chloride 109 94 - 109 mmol/L    Carbon Dioxide 26 20 - 32 mmol/L    Anion Gap 4 3 - 14 mmol/L    Glucose 99 70 - 99 mg/dL    Urea Nitrogen 7 7 - 30 mg/dL    Creatinine 0.71 0.52 - 1.04 mg/dL    GFR Estimate >90 >60 mL/min/[1.73_m2]    GFR Estimate If Black >90 >60 mL/min/[1.73_m2]    Calcium 7.7 (L) 8.5 - 10.1 mg/dL    Bilirubin Total 0.6 0.2 - 1.3 mg/dL    Albumin 2.2 (L) 3.4 - 5.0 g/dL    Protein Total 5.2 (L) 6.8 - 8.8 g/dL    Alkaline Phosphatase 267 (H) 40 - 150 U/L    ALT 45 0 - 50 U/L    AST 60 (H) 0 - 45 U/L   INR   Result Value Ref Range    INR 1.20 (H) 0.86 - 1.14   Lactic acid whole blood   Result Value Ref Range    Lactic Acid 1.5 0.7 - 2.0 mmol/L   CRP inflammation   Result Value Ref Range    CRP Inflammation 11.0 (H) 0.0 - 8.0 mg/L   Lipase   Result  Value Ref Range    Lipase 31 (L) 73 - 393 U/L   Erythrocyte sedimentation rate auto   Result Value Ref Range    Sed Rate 27 0 - 30 mm/h   UA reflex to Microscopic and Culture    Specimen: Urine Midstream; Midstream Urine   Result Value Ref Range    Color Urine Yellow     Appearance Urine Slightly Cloudy     Glucose Urine Negative NEG^Negative mg/dL    Bilirubin Urine Negative NEG^Negative    Ketones Urine Negative NEG^Negative mg/dL    Specific Gravity Urine 1.018 1.003 - 1.035    Blood Urine Trace (A) NEG^Negative    pH Urine 7.0 5.0 - 7.0 pH    Protein Albumin Urine 30 (A) NEG^Negative mg/dL    Urobilinogen mg/dL 2.0 0.0 - 2.0 mg/dL    Nitrite Urine Negative NEG^Negative    Leukocyte Esterase Urine Large (A) NEG^Negative    Source Midstream Urine     RBC Urine 5 (H) 0 - 2 /HPF    WBC Urine 57 (H) 0 - 5 /HPF    Squamous Epithelial /HPF Urine 16 (H) 0 - 1 /HPF    Transitional Epi 2 (H) 0 - 1 /HPF   CT Abdomen Pelvis w Contrast    Narrative    EXAMINATION: CT ABDOMEN PELVIS W CONTRAST, 7/20/2020 4:34 PM    TECHNIQUE:  Helical CT images from the lung bases through the  symphysis pubis were obtained with IV contrast. Contrast dose:  iopamidol (ISOVUE-370) solution 108 mL    COMPARISON: PET CT 6/3/2020. CT abdomen 4/13/2020, 3:30 1/20/2018,  2/10/2028, 2/4/2020.    HISTORY: Abd pain, acute, generalized    FINDINGS:    ABDOMEN:  LIVER: Heterogeneous attenuation of the liver parenchyma. No focal  hepatic lesion. Small amount of free fluid around the liver.  BILE DUCTS: No intrahepatic or extrahepatic biliary dilation. There is  mild enhancement of and inflammation surrounding the bile duct  proximal to the choledochojejunostomy, increased compared to prior  examinations.  GALLBLADDER: Surgically absent.  PANCREAS: Post surgical changes of Whipple procedure. Decreased main  pancreatic ductal dilation. Decreased inflammatory changes surrounding  the region of the pancreaticojejunostomy site.  SPLEEN: Within normal  limits  ADRENALS: within normal limits.  KIDNEYS: No hydronephrosis. No nephrolithiasis. There is thickening of  Gerota's fascia on the right.    PELVIS:   REPRODUCTIVE ORGANS: There is a fluid posterior right lateral of the  uterus measuring 3.2 x 4.5 cm, with slight thickened and enhancing  peritoneum along the periphery. Difficult to discern if this is simple  fluid collection/ascites as there is significant streak artifact from  left hip arthroplasty limiting evaluation.  URETERS: within normal limits.  BLADDER: Partially decompressed.    BOWEL: Postoperative changes from Whipple. No dilated loops of small  or large bowel.There is enhancement and is mild thickened appearance  of the jejunum, this may be secondary to lack of distention or could  be inflammatory in nature. Additionally, there is a thickened  appearance of the sigmoid and proximal transverse colon, this again  could be secondary to lack of full distention versus inflammatory  changes. Normal appendix. Sigmoid diverticulosis.  MESENTERIC LYMPH NODES: No enlarged mesenteric lymph nodes.   PERITONEUM: Small volume ascites particularly around the periphery of  the liver and within the pelvis. No free air within the abdomen or  pelvis. Nodular soft tissue thickening in the right inguinal region  (series 5, image 350) measuring 3.8 x 2.1 cm additional area of  nodularity measuring 2.2 x 2.0 cm (series 5, image 174) along the  right aspect of the abdominal wall at the level of the right kidney,  stable compared to prior.  VESSELS/lymph nodes: Pelvic phleboliths. The major vasculature of the  abdomen and pelvis is patent. Mild aortobiiliac atheromatous plaque.  No adenopathy.  RETROPERITONEUM: within normal limits.  ABDOMINAL WALL: Soft tissue thickening along the anterior midline  abdominal scar. Abdominal wall is otherwise within normal limits.    BONES: Postoperative changes of total left hip arthroplasty.Fixation  screw and fusion of the left  sacroiliac joint. No suspicious osseous  lesions. Multilevel degenerative changes of visualized lumbar spine.      LUNG BASES: Minimal left lower lobe groundglass opacity.      Impression    IMPRESSION:   1. Thickening and enhancement of the bile duct proximal to the  choledochojejunostomy with surrounding inflammatory change, slightly  increased, as can be seen with cholangitis.  2. Fluid with mild adjacent peritoneal thickening and enhancement in  the pelvis posterior to the uterus. This could represent small volume  ascites or a subtle sign of peritonitis.  3. Subtle thickening and enhancement of portions of the jejunum,  sigmoid colon, and proximal transverse colon, this may be secondary to  lack of full distention however or mild enterocolitis.  2. Stable soft tissue nodularity along the right abdominal wall and  right inguinal region, this is favored to be secondary to fat necrosis  from prior fluid collections presence but lack of activity on PET/CT.  3. Decreased main pancreatic ductal dilation.  4. Subtle hazy appearance of the mesentery, this is presumed to be  reactive in nature.  5. Mild left lower lobe groundglass opacity, nonspecific, infection  versus atelectasis.    I have personally reviewed the examination and initial interpretation  and I agree with the findings.    JUNE GODWIN MD     Medications   citalopram (celeXA) tablet 20 mg (has no administration in time range)   famotidine (PEPCID) tablet 20 mg (20 mg Oral Given 7/20/20 2007)   LORazepam (ATIVAN) tablet 0.5 mg (has no administration in time range)   doxepin (SINEquan) 10 MG/ML (HIGH CONC) solution 10 mg (has no administration in time range)   loperamide (IMODIUM) capsule 2 mg (has no administration in time range)   multivitamin w/minerals (THERA-VIT-M) tablet 1 tablet (has no administration in time range)   pantoprazole (PROTONIX) EC tablet 40 mg (40 mg Oral Given 7/20/20 2007)   potassium chloride ER (KLOR-CON M) CR tablet 20  mEq (20 mEq Oral Given 7/20/20 2007)   simethicone (MYLICON) chewable tablet 80 mg (has no administration in time range)   lidocaine 1 % 0.1-1 mL (has no administration in time range)   lidocaine (LMX4) cream (has no administration in time range)   sodium chloride (PF) 0.9% PF flush 3 mL (has no administration in time range)   sodium chloride (PF) 0.9% PF flush 3 mL (3 mLs Intracatheter Not Given 7/20/20 1954)   naloxone (NARCAN) injection 0.1-0.4 mg (has no administration in time range)   melatonin tablet 1 mg (has no administration in time range)   enoxaparin ANTICOAGULANT (LOVENOX) injection 40 mg (has no administration in time range)   sodium chloride 0.9% infusion ( Intravenous New Bag 7/20/20 2007)   calcium carbonate (TUMS) chewable tablet 1,000 mg (has no administration in time range)   ondansetron (ZOFRAN-ODT) ODT tab 4 mg (has no administration in time range)     Or   ondansetron (ZOFRAN) injection 4 mg (has no administration in time range)   prochlorperazine (COMPAZINE) injection 10 mg (has no administration in time range)     Or   prochlorperazine (COMPAZINE) tablet 10 mg (has no administration in time range)     Or   prochlorperazine (COMPAZINE) Suppository 25 mg (has no administration in time range)   HYDROmorphone (DILAUDID) tablet 2 mg (2 mg Oral Given 7/20/20 2007)   HYDROmorphone (PF) (DILAUDID) injection 1 mg (1 mg Intravenous Given 7/20/20 1930)   metroNIDAZOLE (FLAGYL) infusion 500 mg (has no administration in time range)   ceFEPIme (MAXIPIME) 2 g vial to attach to  ml bag for ADULTS or 50 ml bag for PEDS (has no administration in time range)   HYDROmorphone (PF) (DILAUDID) injection 0.5 mg (0.5 mg Intravenous Given 7/20/20 1554)   sodium chloride (PF) 0.9% PF flush 77 mL (77 mLs Intravenous Given 7/20/20 1620)   iopamidol (ISOVUE-370) solution 108 mL (108 mLs Intravenous Given 7/20/20 1620)   levofloxacin (LEVAQUIN) infusion 500 mg (500 mg Intravenous New Bag 7/20/20 2421)   HYDROmorphone  (PF) (DILAUDID) injection 0.5 mg (0.5 mg Intravenous Given 7/20/20 1746)   metroNIDAZOLE (FLAGYL) infusion 500 mg (0 mg Intravenous Stopped 7/20/20 1900)             Assessments & Plan (with Medical Decision Making)    Acute Epigastric pain in the pt with stomach ca on chemo, s/p Whipple, no fever but WBC 25K, CT consistent with cholangitis, cx and start levaquin and flagyl as she is pen allergic, will discuss with Medicine and admit.    Stomach Ca on chemo, last one about one week ago.         I have reviewed the nursing notes.    I have reviewed the findings, diagnosis, plan and need for follow up with the patient.    New Prescriptions    No medications on file       Final diagnoses:   Cholangitis   Malignant neoplasm of stomach, unspecified location (H)     I, Marisol Simon, am serving as a trained medical scribe to document services personally performed by Collins Santana MD based on the provider's statements to me on March 20, 2018.  This document has been checked and approved by the attending provider.    ICollins MD, was physically present and have reviewed and verified the accuracy of this note documented by Marisol Simon, medical scribe.  7/20/2020   [facility], [place], EMERGENCY DEPARTMENT     Collins Santana MD  07/20/20 9991

## 2020-07-20 NOTE — ED TRIAGE NOTES
Arrived to ED d/t c/o abdominal pain, has nausea but it's an ongoing issue and uses Zofran at home, denies diarrhea or dark or bloody stools, hx of stomach cancer, VSS upon arrival to ED

## 2020-07-20 NOTE — H&P
[facility], [place]    History and Physical - Hospitalist Service, Gold Night Service       Date of Admission:  7/20/2020    Assessment & Plan     Ms. Bashir is a 54 year old female with a past medical history of poorly differentiated gastric adenocarcinoma on chemotherapy (Taxotere, 5-Follow-up, oxaliplatinum), pancreatic cyst status post Whipple procedure (1/28/20) cholecystectomy who presented to the ER today with acute abdominal pain starting yesterday and getting progressively worse. She reports the pain is centrally locate, in one spot and crampy in nature. She denies any fevers, chills, chest pain, shortness of breath, dizziness, lightheadedness, vomiting or additonal new symptoms. Upon arrival to the ER, she was found to be hemodynamically stable, afebrile with vitals of 116/72, 99% on miller air, 98.5F and HR 95 and regular. Labwork including CBC, BMP was without any acute issues. Liver enzymes displayed an Alk Phos of 267, lipase of 31 and a normal lactate. CT abdomen and pelvis revealed thickening and enhancement of the bile duct proximal to the choledochojejunostomy with surrounding inflammation, consistent with cholangitis, 3.2x4.5cm fluid collection in the pelvis posterior to the uterus with subtle thickening and enhancement of the jejunum, sigmoid colon and proximal transverse colong, where enterocolitis cannot be ruled out. There is a stable soft tissue nodule along right abdominal wall and righ inguinal region. She was given Zofran, IV fluids along with Flagyl and Levaquin. She will be admitted to the [facility] Service for further care.     1. Acute cholangitis with associated nausea, abdominal pain  2. 3.2 x 4.5cm fluid collection in pelvis  She is currently hemodynamically stable, afebrile but continues to struggle with worsening nausea, pain. She was started on Levaquin and Flagyl in the ER along with Zofran and given IV fluids. Per our discussion, her plan of  care will include the following:  -Gastroenterology consult to assess and provide further recommendations for cholangitis, fluid collection   -Continue pain management with Dilaudid 3mg q3h prn and Dilaudid IV 1mg q4h prn for moderate severe pain along with Ibuprofen 200mg three times daily.  -Continue with Zofran and Compazine prn  -She received Flagyl, Levaquin in the ER, we will continue with Flagyl, Cefepime given her moderate risk from immunocompromised status  -Clear liquid diet, will likely be able to slowly advance tomorrow depending on improvement    3. Poorly differentiated gastric adenocarcinoma, HER-2 negative status post neoadjuvant FLOT (3/7/20) and cycle 7   4. Oxaliplatin related neuropathy, stable  5. Bilateral hand rash secondary to 5-Follow-up, improving  -Medical Oncology consult for co-management of chemotherapy related symptoms and management of cancer while hospitalized  - She has required a dose reduction of her oxaliplatin by 20% due to cold sensitivity/neuropathy with cycle #5. 5FU was decreased to 85% with cycle #6 due to mucositis/diarrhea/taste changes. Cycle 7 was initially held secondary to diarrhea, then given at her 7/15/20 visit.   -Continue with hadn and foot soaks twice daily as tolerated, Eucerin cream as often as needed to keep skin moisturized and hydrocortisone cream prn for irritation    6. GERD  -Protonix 40mg twice daiyl and Famotidine 20mg twice daily   -TUMS prn for any epigastric pains  -Simethicone prn for any difficulties with gas       Diet: NPO with sips of water with medications  DVT Prophylaxis: Enoxaparin (Lovenox) SQ  Hernandez Catheter: not present  Code Status: FULL CODE         Disposition Plan   Expected discharge: 2 - 3 days, recommended to prior living arrangement once adequate pain management/ tolerating PO medications.  Entered: RAHEEL Junior 07/20/2020, 6:10 PM       RAHEEL Junior  [facility], [place]  Pager:  [phone]  Please see sticky note for cross cover information  ______________________________________________________________________    Chief Complaint   Abdominal pain    History is obtained from the patient    History of Present Illness   Ms. Bashir is a 54 year old female with a past medical history of poorly differentiated gastric adenocarcinoma on chemotherapy (Taxotere, 5-Follow-up, oxaliplatinum), pancreatic cyst status post Whipple procedure (1/28/20) cholecystectomy who presented to the ER today with acute abdominal pain starting yesterday and getting progressively worse. She reports the pain is centrally locate, in one spot and crampy in nature. She denies any fevers, chills, chest pain, shortness of breath, dizziness, lightheadedness, vomiting or additonal new symptoms. Upon arrival to the ER, she was found to be hemodynamically stable, afebrile with vitals of 116/72, 99% on miller air, 98.5F and HR 95 and regular. Labwork including CBC, BMP was without any acute issues. Liver enzymes displayed an Alk Phos of 267, lipase of 31 and a normal lactate. CT abdomen and pelvis revealed thickening and enhancement of the bile duct proximal to the choledochojejunostomy with surrounding inflammation, consistent with cholangitis, 3.2x4.5cm fluid collection in the pelvis posterior to the uterus with subtle thickening and enhancement of the jejunum, sigmoid colon and proximal transverse colong, where enterocolitis cannot be ruled out. There is a stable soft tissue nodule along right abdominal wall and righ inguinal region. She was given Zofran, IV fluids along with Flagyl and Levaquin. She will be admitted to the [facility] for further care.    Review of Systems    The 10 point Review of Systems is negative other than noted in the HPI or here.     Past Medical History    I have reviewed this patient's medical history and updated it with pertinent information if needed.   Past Medical History:   Diagnosis Date      Allergic rhinitis      Cancer (H)     stomach cancer     Depression      Lumbago      Migraine      Other chronic pain     low back     Sacroiliitis (H)     low back pain     Trochanteric bursitis     leg length discrrepancy, hip pain       Past Surgical History   I have reviewed this patient's surgical history and updated it with pertinent information if needed.  Past Surgical History:   Procedure Laterality Date     ARTHROPLASTY HIP Left 2016     BACK SURGERY      L4-5 decompression     C REPAIR DETACH RETINA,SCLERAL BUCKLE       CATARACT IOL, RT/LT       CHOLECYSTECTOMY N/A 1/28/2020    Procedure: Cholecystectomy;  Surgeon: Yandel Mallory MD;  Location: [facility] OR     ESOPHAGOSCOPY, GASTROSCOPY, DUODENOSCOPY (EGD), COMBINED N/A 3/3/2020    Procedure: ESOPHAGOGASTRODUODENOSCOPY, WITH ENDOSCOPIC US (enoxaparin);  Surgeon: Bakari Plata MD;  [facility]: [facility]     EYE SURGERY       IR CHEST PORT PLACEMENT > 5 YRS OF AGE  3/26/2020     OPEN REDUCTION INTERNAL FIXATION RODDING INTRAMEDULLARY FEMUR Left 12/23/2014    Procedure: OPEN REDUCTION INTERNAL FIXATION RODDING INTRAMEDULLARY FEMUR;  Surgeon: Arnol Santiago MD;  Location: [facility] OR     ORTHOPEDIC SURGERY       PICC DOUBLE LUMEN PLACEMENT Right 02/07/2020    5 fr double lumen 41 cm     REMOVE HARDWARE LOWER EXTREMITY Left 4/7/2015    Procedure: REMOVE HARDWARE LOWER EXTREMITY;  Surgeon: Arnol Santiago MD;  Location: [facility] OR     REMOVE HARDWARE RODDING INTRAMEDULLARY FEMUR Left 12/17/2015    Procedure: REMOVE HARDWARE RODDING INTRAMEDULLARY FEMUR;  Surgeon: Arnol Santiago MD;  Location: [facility] OR     RESECT BONE LOWER EXTREMITY Left 12/23/2014    Procedure: RESECT BONE LOWER EXTREMITY;  Surgeon: Arnol Santiago MD;  Location: [facility] OR     WHIPPLE PROCEDURE N/A 1/28/2020    Procedure: Open Whipple procedure and Cholecystectomy;  Surgeon: Yandel Mallory MD;  Location: [facility] OR       Social History   I have reviewed this patient's social history and updated it with  pertinent information if needed.      Family History   I have reviewed this patient's family history and updated it with pertinent information if needed.  Family History   Problem Relation Age of Onset     Heart Failure Mother 77     Anesthesia Reaction No family hx of      Deep Vein Thrombosis No family hx of        Prior to Admission Medications   Prior to Admission Medications   Prescriptions Last Dose Informant Patient Reported? Taking?   LORazepam (ATIVAN) 0.5 MG tablet   No No   Sig: Take 1 tablet (0.5 mg) by mouth every 4 hours as needed (Anxiety, Nausea/Vomiting or Sleep)   OLANZapine zydis (ZYPREXA) 5 MG ODT   No No   Sig: Take 1 tablet (5 mg) by mouth At Bedtime   SUMAtriptan (IMITREX) 100 MG tablet   Yes No   Sig: Take 100 mg by mouth as needed   amitriptyline (ELAVIL) 25 MG tablet   Yes No   Sig: Take 3 tablets by mouth At Bedtime   benzocaine-menthol (CEPACOL) 15-3.6 MG lozenge   No No   Sig: Place 1 lozenge inside cheek every hour as needed for sore throat   calcium carb 1250 mg, 500 mg [place],/vitamin D 200 units (OSCAL WITH D) 500-200 MG-UNIT per tablet  Self Yes No   Sig: Take 1 tablet by mouth 3 times daily (with meals)   citalopram (CELEXA) 20 MG tablet   Yes No   Sig: Take 20 mg by mouth every morning    dexamethasone (DECADRON) 4 MG tablet   No No   Sig: Take 1 tablet (4mg) the day before, day of, and 2 days after chemotherapy   diclofenac (FLECTOR) 1.3 % patch   Yes No   Sig: Place 1 patch onto the skin daily as needed    doxepin (SINEQUAN) 10 MG/ML (HIGH CONC) solution   No No   Sig: Take 1 mL (10 mg) by mouth 2 times daily   famotidine (PEPCID) 20 MG tablet   No No   Sig: Take 1 tablet (20 mg) by mouth 2 times daily   fluticasone (FLONASE) 50 MCG/ACT nasal spray  Self Yes No   Sig: Spray 2 sprays into both nostrils daily as needed    hydrOXYzine (ATARAX) 10 MG tablet   Yes No   Sig: Take 10 mg by mouth 3 times daily as needed for itching   loperamide (IMODIUM) 2 MG capsule   Yes No   Sig: Take  2 mg by mouth 4 times daily as needed for diarrhea   magic mouthwash suspension, diphenhydrAMINE, lidocaine, aluminum-magnesium & simethicone, (FIRST-MOUTHWASH BLM) compounding kit   No No   Sig: Swish and spit 10 mLs in mouth every 6 hours as needed for mouth sores   Patient not taking: Reported on 7/9/2020   meclizine (ANTIVERT) 25 MG tablet   No No   Sig: Take 1 tablet (25 mg) by mouth 3 times daily as needed for dizziness   multivitamin, therapeutic with minerals (MULTI-VITAMIN) TABS  Self Yes No   Sig: Take 1 tablet by mouth daily   naloxone (NARCAN) 4 MG/0.1ML nasal spray   No No   Sig: Spray 1 spray (4 mg) into one nostril alternating nostrils once as needed for opioid reversal every 2-3 minutes until assistance arrives   Patient not taking: Reported on 7/9/2020   ondansetron (ZOFRAN) 8 MG tablet   No No   Sig: Take 1 tablet (8 mg) by mouth every 8 hours as needed (Nausea/Vomiting)   ondansetron (ZOFRAN-ODT) 4 MG ODT tab   No No   Sig: Take 1 tablet (4 mg) by mouth every 6 hours as needed for nausea or vomiting   oxyCODONE (ROXICODONE) 5 MG tablet   No No   Sig: Take 1 tablet (5 mg) by mouth 3 times daily as needed for pain (. Stop tramadol. Cannot take with tramadol.)   pantoprazole (PROTONIX) 40 MG EC tablet   No No   Sig: TAKE ONE TABLET BY MOUTH TWICE A DAY   potassium chloride ER (KLOR-CON M) 20 MEQ CR tablet   No No   Sig: Take 1 tablet (20 mEq) by mouth daily   senna-docusate (SENOKOT-S/PERICOLACE) 8.6-50 MG tablet   No No   Sig: Take 1 tablet by mouth daily as needed for constipation   Patient not taking: Reported on 7/9/2020   simethicone 80 MG TABS   No No   Sig: Take 1 tablet by mouth every 6 hours as needed (bloating, gas, belching)   tiZANidine (ZANAFLEX) 4 MG tablet   Yes No   Sig: Take 1 tablet by mouth 2 times daily as needed      Facility-Administered Medications: None     Allergies   Allergies   Allergen Reactions     Gluten Meal Palpitations     Augmentin Rash     Oxycontin [Oxycodone]       Confusion, loopy, oxycodone is tolerated     Pregabalin      interfered with Cymbalta     Topiramate      Tongue numbness, taste alteration, irritable      Acetaminophen Nausea     Urine retention       Dust Mite Extract      Gabapentin Dizziness and GI Disturbance     Latex Rash     Methadone Fatigue     Mold      Naprosyn [Naproxen] Dizziness and GI Disturbance     Oxymetholone      Seasonal Allergies        Physical Exam   Vital Signs: Temp: 98.5  F (36.9  C) Temp src: Oral BP: 116/72 Pulse: 97   Resp: 12 SpO2: 99 % O2 Device: None (Room air)    Weight: 175 lbs 0 oz    General Appearance: 54 year old female resting in her bed, no acute distress, appears mildly uncomfortable secondary to abdominal pain  HEENT: normocephalic, atraumatic, PERRLA, oroharynx is dry, no aphthous ulcerations present  Respiratory: breathing comfortably on room air, no adventitious sounds to bilateral auscultation  Cardiovascular: regular rate and rhythm, no appreciable murmurs, rubs or gallops  GI: soft, bowel sounds tinkering, tender to palpation over mid abdomen with radiation to upper quadrants  Skin: warm, dry, right foot with two erythematous lesions and left foot with one erythematous lesions, hands are dry and flaky  Lines: port site over left chest clean and dry  Psychiatric: alert, oriented to name, date, hospital and recent events    Data   Data reviewed today: I reviewed all medications, new labs and imaging results over the last 24 hours.     Recent Labs   Lab 07/20/20  1509 07/15/20  0932   WBC 25.4* 9.8   HGB 8.8* 9.6*   MCV 88 87   * 345   INR 1.20*  --     139   POTASSIUM 3.4 3.8   CHLORIDE 109 108   CO2 26 24   BUN 7 10   CR 0.71 0.84   ANIONGAP 4 6   PETRA 7.7* 7.4*   GLC 99 92   ALBUMIN 2.2* 2.0*   PROTTOTAL 5.2* 5.2*   BILITOTAL 0.6 0.4   ALKPHOS 267* 187*   ALT 45 29   AST 60* 46*   LIPASE 31*  --

## 2020-07-20 NOTE — LETTER
Patient:  Tiki Bashir  :   1965  MRN:     [id number]        Ms.Shelley Bashir  [address] [address]        2020    Dear ,    We are writing to inform you of your test results. In short, the biopsies confirmed mild inflammation, though this is nonspecific. I suspect that over time these will improve, in particular with once daily PPI. We will organize a repeat evaluation by endoscopy in 8 weeks.     I have included the formal documtentation of the results below. It continues to be a pleasure participating in your care.  Please feel free to contact our clinic with any further questions.      Sincerely,    Jeet Steve MD PhD FACG ESHA VALLE  Director of Endoscopy  Associate Professor of Medicine, Surgery and Pediatrics  Interventional and Therapeutic Endoscopy    [facility]  Division of Gastroenterology and Hepatology  [facility] [address], [place] [address]    New Consultations  [phone]  Procedure Scheduling [phone]  Clinical Nurse Coordinator [phone]  Clinical Fax   [phone]  Administrative   [phone]  Administrative Fax  [phone]        Resulted Orders   Surgical pathology exam   Result Value Ref Range    Copath Report       Patient Name: TIKI BASHIR  MR#: [id number]  Specimen #: [id number]  Collected: 2020  Received: 2020  Reported: 2020 17:59  Ordering Phy(s): JEET STEVE    For improved result formatting, select 'View Enhanced Report Format' under   Linked Documents section.    SPECIMEN(S):  Jejununal erosion biopsies    FINAL DIAGNOSIS:  JEJUNAL EROSION BIOPSIES:  - Small intestinal mucosa with acute inflammation, cryptitis and   ulceration (see comment)  - No evidence of dysplasia or malignancy    COMMENT:  The biopsy shows small intestinal mucosa with acute inflammation,   cryptitis and ulceration. No evidence of  chronicity is identified. These findings may  "represent postsurgical   changes vs medication effect vs infection.  Clinical correlation is recommended.    I have personally reviewed all specimens and/or slides, including the   listed special stains, and used them  with my medical judgement to determine or confirm the final diagnosis.    Electronically signed out by:     Omid Carr M.D., [facility]    CLINICAL HISTORY:  Cholangitis, h/o of gastric adenocarcinoma.    GROSS:  The specimen is received in formalin with proper patient identification,   labeled \"jejunal erosion biopsies\".  The specimen consists of 4 irregular tan soft tissues 0.2-0.4 cm in   greatest dimension which are entirely  submitted in cassette A1. (Dictated by: RAHEEL Salinas 7/27/2020 12:40   PM)    MICROSCOPIC:  Microscopic examination was performed.    The technical component of this testing was completed at the [facility], with the professional component performed   at the [facility], [address] [place] [phone] ([phone])    CPT Codes:  A: 52984-RZ2    COLLECTION SITE:  Client: General acute hospital  Location: [facility] (B)    Resident  [id number]             "

## 2020-07-20 NOTE — TELEPHONE ENCOUNTER
Pt calling to report constant sharp pain above her umbilicus since yesterday. Moving makes it worse.  Denies heartburn, n/v, constipation, last stool yesterday. Abdomen is slightly distended and she is passing gas. Pain is 8/10.     Paged to Roxana Sweet PA-C @ 2727    To be evaluated in ED. Pt notified

## 2020-07-21 DIAGNOSIS — Z11.59 ENCOUNTER FOR SCREENING FOR OTHER VIRAL DISEASES: Primary | ICD-10-CM

## 2020-07-21 PROBLEM — D69.6 THROMBOCYTOPENIA (H): Status: ACTIVE | Noted: 2020-07-21

## 2020-07-21 PROBLEM — K50.00 CROHN'S DISEASE OF SMALL INTESTINE (H): Status: ACTIVE | Noted: 2020-07-21

## 2020-07-21 PROBLEM — N39.0 URINARY TRACT INFECTION: Status: ACTIVE | Noted: 2020-07-21

## 2020-07-21 LAB
ALBUMIN SERPL-MCNC: 1.8 G/DL (ref 3.4–5)
ALP SERPL-CCNC: 216 U/L (ref 40–150)
ALT SERPL W P-5'-P-CCNC: 39 U/L (ref 0–50)
ANION GAP SERPL CALCULATED.3IONS-SCNC: 3 MMOL/L (ref 3–14)
ANISOCYTOSIS BLD QL SMEAR: ABNORMAL
AST SERPL W P-5'-P-CCNC: 49 U/L (ref 0–45)
BACTERIA SPEC CULT: NORMAL
BASOPHILS # BLD AUTO: 0 10E9/L (ref 0–0.2)
BASOPHILS NFR BLD AUTO: 0 %
BILIRUB DIRECT SERPL-MCNC: 0.2 MG/DL (ref 0–0.2)
BILIRUB SERPL-MCNC: 0.5 MG/DL (ref 0.2–1.3)
BUN SERPL-MCNC: 7 MG/DL (ref 7–30)
CALCIUM SERPL-MCNC: 7.5 MG/DL (ref 8.5–10.1)
CHLORIDE SERPL-SCNC: 109 MMOL/L (ref 94–109)
CO2 SERPL-SCNC: 27 MMOL/L (ref 20–32)
CREAT SERPL-MCNC: 0.74 MG/DL (ref 0.52–1.04)
DIFFERENTIAL METHOD BLD: ABNORMAL
EOSINOPHIL # BLD AUTO: 0 10E9/L (ref 0–0.7)
EOSINOPHIL NFR BLD AUTO: 0 %
ERYTHROCYTE [DISTWIDTH] IN BLOOD BY AUTOMATED COUNT: 22.7 % (ref 10–15)
ERYTHROCYTE [DISTWIDTH] IN BLOOD BY AUTOMATED COUNT: 22.8 % (ref 10–15)
GFR SERPL CREATININE-BSD FRML MDRD: >90 ML/MIN/{1.73_M2}
GLUCOSE SERPL-MCNC: 96 MG/DL (ref 70–99)
HCT VFR BLD AUTO: 24.4 % (ref 35–47)
HCT VFR BLD AUTO: 25 % (ref 35–47)
HGB BLD-MCNC: 7.5 G/DL (ref 11.7–15.7)
HGB BLD-MCNC: 7.8 G/DL (ref 11.7–15.7)
LYMPHOCYTES # BLD AUTO: 1.6 10E9/L (ref 0.8–5.3)
LYMPHOCYTES NFR BLD AUTO: 19.5 %
Lab: NORMAL
MACROCYTES BLD QL SMEAR: PRESENT
MCH RBC QN AUTO: 26.9 PG (ref 26.5–33)
MCH RBC QN AUTO: 27.2 PG (ref 26.5–33)
MCHC RBC AUTO-ENTMCNC: 30.7 G/DL (ref 31.5–36.5)
MCHC RBC AUTO-ENTMCNC: 31.2 G/DL (ref 31.5–36.5)
MCV RBC AUTO: 87 FL (ref 78–100)
MCV RBC AUTO: 88 FL (ref 78–100)
MONOCYTES # BLD AUTO: 0.2 10E9/L (ref 0–1.3)
MONOCYTES NFR BLD AUTO: 1.8 %
MYELOCYTES # BLD: 0.2 10E9/L
MYELOCYTES NFR BLD MANUAL: 2.7 %
NEUTROPHILS # BLD AUTO: 6.4 10E9/L (ref 1.6–8.3)
NEUTROPHILS NFR BLD AUTO: 76 %
NRBC # BLD AUTO: 0.1 10*3/UL
NRBC BLD AUTO-RTO: 1 /100
OVALOCYTES BLD QL SMEAR: SLIGHT
PLATELET # BLD AUTO: 67 10E9/L (ref 150–450)
PLATELET # BLD AUTO: 72 10E9/L (ref 150–450)
PLATELET # BLD EST: ABNORMAL 10*3/UL
POIKILOCYTOSIS BLD QL SMEAR: SLIGHT
POLYCHROMASIA BLD QL SMEAR: ABNORMAL
POTASSIUM SERPL-SCNC: 3.4 MMOL/L (ref 3.4–5.3)
PROT SERPL-MCNC: 4.6 G/DL (ref 6.8–8.8)
RBC # BLD AUTO: 2.79 10E12/L (ref 3.8–5.2)
RBC # BLD AUTO: 2.87 10E12/L (ref 3.8–5.2)
RETICS # AUTO: 8.4 10E9/L (ref 25–95)
RETICS/RBC NFR AUTO: 0.3 % (ref 0.5–2)
SODIUM SERPL-SCNC: 139 MMOL/L (ref 133–144)
SPECIMEN SOURCE: NORMAL
TARGETS BLD QL SMEAR: SLIGHT
WBC # BLD AUTO: 13.4 10E9/L (ref 4–11)
WBC # BLD AUTO: 8.4 10E9/L (ref 4–11)

## 2020-07-21 PROCEDURE — 85045 AUTOMATED RETICULOCYTE COUNT: CPT | Performed by: INTERNAL MEDICINE

## 2020-07-21 PROCEDURE — 80076 HEPATIC FUNCTION PANEL: CPT | Performed by: HOSPITALIST

## 2020-07-21 PROCEDURE — 99223 1ST HOSP IP/OBS HIGH 75: CPT | Mod: GC | Performed by: INTERNAL MEDICINE

## 2020-07-21 PROCEDURE — 25000128 H RX IP 250 OP 636: Performed by: INTERNAL MEDICINE

## 2020-07-21 PROCEDURE — 25000132 ZZH RX MED GY IP 250 OP 250 PS 637: Mod: GY | Performed by: PHYSICIAN ASSISTANT

## 2020-07-21 PROCEDURE — 40000611 ZZHCL STATISTIC MORPHOLOGY W/INTERP HEMEPATH TC 85060: Performed by: INTERNAL MEDICINE

## 2020-07-21 PROCEDURE — 25000128 H RX IP 250 OP 636: Performed by: PHYSICIAN ASSISTANT

## 2020-07-21 PROCEDURE — 85025 COMPLETE CBC W/AUTO DIFF WBC: CPT | Performed by: INTERNAL MEDICINE

## 2020-07-21 PROCEDURE — 85385 FIBRINOGEN ANTIGEN: CPT | Performed by: INTERNAL MEDICINE

## 2020-07-21 PROCEDURE — 85027 COMPLETE CBC AUTOMATED: CPT | Performed by: HOSPITALIST

## 2020-07-21 PROCEDURE — 80048 BASIC METABOLIC PNL TOTAL CA: CPT | Performed by: HOSPITALIST

## 2020-07-21 PROCEDURE — 12000012 ZZH R&B MS OVERFLOW UMMC

## 2020-07-21 PROCEDURE — 25000132 ZZH RX MED GY IP 250 OP 250 PS 637: Mod: GY | Performed by: HOSPITALIST

## 2020-07-21 PROCEDURE — 99232 SBSQ HOSP IP/OBS MODERATE 35: CPT | Performed by: INTERNAL MEDICINE

## 2020-07-21 RX ORDER — PIPERACILLIN SODIUM, TAZOBACTAM SODIUM 3; .375 G/15ML; G/15ML
3.38 INJECTION, POWDER, LYOPHILIZED, FOR SOLUTION INTRAVENOUS EVERY 6 HOURS
Status: DISCONTINUED | OUTPATIENT
Start: 2020-07-21 | End: 2020-07-23

## 2020-07-21 RX ORDER — AMITRIPTYLINE HYDROCHLORIDE 75 MG/1
75 TABLET ORAL AT BEDTIME
Status: DISCONTINUED | OUTPATIENT
Start: 2020-07-21 | End: 2020-07-29 | Stop reason: HOSPADM

## 2020-07-21 RX ADMIN — METRONIDAZOLE 500 MG: 500 INJECTION, SOLUTION INTRAVENOUS at 02:02

## 2020-07-21 RX ADMIN — HYDROMORPHONE HYDROCHLORIDE 1 MG: 1 INJECTION, SOLUTION INTRAMUSCULAR; INTRAVENOUS; SUBCUTANEOUS at 00:17

## 2020-07-21 RX ADMIN — AMITRIPTYLINE HYDROCHLORIDE 75 MG: 75 TABLET, FILM COATED ORAL at 21:57

## 2020-07-21 RX ADMIN — Medication 1 MG: at 02:02

## 2020-07-21 RX ADMIN — PIPERACILLIN AND TAZOBACTAM 3.38 G: 3; .375 INJECTION, POWDER, FOR SOLUTION INTRAVENOUS at 20:04

## 2020-07-21 RX ADMIN — HYDROMORPHONE HYDROCHLORIDE 1 MG: 1 INJECTION, SOLUTION INTRAMUSCULAR; INTRAVENOUS; SUBCUTANEOUS at 12:36

## 2020-07-21 RX ADMIN — HYDROMORPHONE HYDROCHLORIDE 2 MG: 2 TABLET ORAL at 17:19

## 2020-07-21 RX ADMIN — HYDROMORPHONE HYDROCHLORIDE 2 MG: 2 TABLET ORAL at 06:14

## 2020-07-21 RX ADMIN — PIPERACILLIN AND TAZOBACTAM 3.38 G: 3; .375 INJECTION, POWDER, FOR SOLUTION INTRAVENOUS at 14:22

## 2020-07-21 RX ADMIN — CEFEPIME HYDROCHLORIDE 2 G: 2 INJECTION, POWDER, FOR SOLUTION INTRAVENOUS at 06:59

## 2020-07-21 RX ADMIN — PANTOPRAZOLE SODIUM 40 MG: 40 TABLET, DELAYED RELEASE ORAL at 20:03

## 2020-07-21 RX ADMIN — METRONIDAZOLE 500 MG: 500 INJECTION, SOLUTION INTRAVENOUS at 09:45

## 2020-07-21 RX ADMIN — AMITRIPTYLINE HYDROCHLORIDE 75 MG: 75 TABLET, FILM COATED ORAL at 04:15

## 2020-07-21 RX ADMIN — FAMOTIDINE 20 MG: 20 TABLET ORAL at 20:03

## 2020-07-21 RX ADMIN — HYDROMORPHONE HYDROCHLORIDE 1 MG: 1 INJECTION, SOLUTION INTRAMUSCULAR; INTRAVENOUS; SUBCUTANEOUS at 20:10

## 2020-07-21 RX ADMIN — MULTIPLE VITAMINS W/ MINERALS TAB 1 TABLET: TAB at 08:32

## 2020-07-21 RX ADMIN — CITALOPRAM HYDROBROMIDE 20 MG: 20 TABLET, FILM COATED ORAL at 08:31

## 2020-07-21 RX ADMIN — HYDROMORPHONE HYDROCHLORIDE 1 MG: 1 INJECTION, SOLUTION INTRAMUSCULAR; INTRAVENOUS; SUBCUTANEOUS at 08:23

## 2020-07-21 RX ADMIN — POTASSIUM CHLORIDE 20 MEQ: 750 TABLET, EXTENDED RELEASE ORAL at 08:31

## 2020-07-21 RX ADMIN — HYDROMORPHONE HYDROCHLORIDE 1 MG: 1 INJECTION, SOLUTION INTRAMUSCULAR; INTRAVENOUS; SUBCUTANEOUS at 04:15

## 2020-07-21 RX ADMIN — HYDROMORPHONE HYDROCHLORIDE 2 MG: 2 TABLET ORAL at 14:22

## 2020-07-21 RX ADMIN — PANTOPRAZOLE SODIUM 40 MG: 40 TABLET, DELAYED RELEASE ORAL at 08:32

## 2020-07-21 RX ADMIN — FAMOTIDINE 20 MG: 20 TABLET ORAL at 08:31

## 2020-07-21 RX ADMIN — HYDROMORPHONE HYDROCHLORIDE 2 MG: 2 TABLET ORAL at 02:02

## 2020-07-21 RX ADMIN — HYDROMORPHONE HYDROCHLORIDE 2 MG: 2 TABLET ORAL at 09:53

## 2020-07-21 ASSESSMENT — ACTIVITIES OF DAILY LIVING (ADL)
COGNITION: 0 - NO COGNITION ISSUES REPORTED
RETIRED_EATING: 0-->INDEPENDENT
TRANSFERRING: 0-->INDEPENDENT
ADLS_ACUITY_SCORE: 12
ADLS_ACUITY_SCORE: 11
ADLS_ACUITY_SCORE: 12
FALL_HISTORY_WITHIN_LAST_SIX_MONTHS: NO
BATHING: 0-->INDEPENDENT
SWALLOWING: 0-->SWALLOWS FOODS/LIQUIDS WITHOUT DIFFICULTY
ADLS_ACUITY_SCORE: 11
TOILETING: 0-->INDEPENDENT
DRESS: 0-->INDEPENDENT
ADLS_ACUITY_SCORE: 11
RETIRED_COMMUNICATION: 0-->UNDERSTANDS/COMMUNICATES WITHOUT DIFFICULTY
ADLS_ACUITY_SCORE: 12
AMBULATION: 1-->ASSISTIVE EQUIPMENT

## 2020-07-21 ASSESSMENT — PAIN DESCRIPTION - DESCRIPTORS: DESCRIPTORS: CONSTANT;SHOOTING

## 2020-07-21 ASSESSMENT — MIFFLIN-ST. JEOR: SCORE: 1428.24

## 2020-07-21 NOTE — PLAN OF CARE
"Vital signs:  Temp: 97.2  F (36.2  C) Temp src: Axillary BP: 120/53 Pulse: 95   Resp: 20 SpO2: 96 % O2 Device: None (Room air)   Height: 172.7 cm (5' 8\") Weight: 77.5 kg (170 lb 12.8 oz)  Estimated body mass index is 25.97 kg/m  as calculated from the following:    Height as of this encounter: 1.727 m (5' 8\").    Weight as of this encounter: 77.5 kg (170 lb 12.8 oz).     Pain levels for Nathalie have been consistent and constant this shift.  She has requested both oral/IV dilaudid and team is aware that she is using both.  She described her pain as \"shooting and does not go away\" pointing to her right upper abdominal quadrant.  She is on clear liquids and she was able to have a bit of jello this morning and broth.  She stated that she felt better having \"something\" in her belly.  She has been up to the bathroom on her own but otherwise she has been resting quietly in bed.  Port-a-cath accessed with fluids at TKO.  Waiting for further direction from the team.      Problem: Adult Inpatient Plan of Care  Goal: Plan of Care Review  7/21/2020 1249 by Radha Adames RN  Flowsheets (Taken 7/21/2020 1249)  Plan of Care Reviewed With: patient  Progress: no change     Problem: Gastrointestinal Condition Comorbidity  Goal: Gastrointestinal Condition  Description: Patient comorbidity will be monitored for signs and symptoms of Gastrointestinal condition.  Problems will be absent, minimized or managed by discharge/transition of care.  7/21/2020 1249 by Radha Adames, RN  Outcome: No Change     Problem: Pain Chronic (Persistent) (Comorbidity Management)  Goal: Acceptable Pain Control and Functional Ability  7/21/2020 1249 by Radha Adames, RN  Outcome: No Change     "

## 2020-07-21 NOTE — CONSULTS
Advanced Endoscopy/Pancreaticobiliary Consultation      Date of Admission:  7/20/2020  Reason for Admission: Abdominal pain  Date of Consult  7/21/2020   Requesting Physician:  Junior Squires*           ASSESSMENT AND RECOMMENDATIONS:   Assessment:  54 year old female with a history of poorly differentiated gastric adenocarcinoma on chemotherapy discovered after Whipple procedure in January of this year (done for pancreatic cyst) who presented to the emergency department with worsening right upper quadrant abdominal pain.  CT scan of the abdomen and pelvis obtained revealed thickening and enhancement of the bile duct proximal to the choledochojejunostomy with surrounding inflammation concerning for cholangitis.  Her liver tests are mildly abnormal including alkaline phosphatase that is increased from her baseline.  Her white blood cell count was 25,000 on admission but did just receive Neulasta 4 days prior to admission.  At this point it is unclear if her symptoms are related to cholangitis despite CT findings.  She does not have any biliary dilation. Will plan to monitor clinically on antibiotics. No urgent ERCP indicated at this point.     Recommendations:  Continue conservative management  Continue antibiotics  No urgent ERCP indicated  OK for diet today  Analgesia/Antiemetics per primary team  Discussed with primary team Dr. Mendiola    Gastroenterology follow up recommendations: TBD    Thank you for involving us in this patient's care. Please do not hesitate to contact the GI service with any questions or concerns.     Pt seen and care plan discussed with Dr. Plata, GI staff physician.    Mirta Neri PA-C  Advanced Endoscopy/Pancreaticobiliary GI Service  [facility]  Pager *[phone]  Text Page  -------------------------------------------------------------------------------------------------------------------       Reason for Consultation:   Concern for  cholangitis           History of Present Illness:   Patient seen and examined at 1020. History is obtained from the patient.    Nathalie Bashir is a 54 year old female with a PMH significant for poorly differentiated gastric adenocarcinoma on chemotherapy discovered after Whipple procedure in January of this year (done for pancreatic cyst) who presented to the emergency department with worsening right upper quadrant abdominal pain.  The patient reports onset of right upper quadrant abdominal pain about 5 days prior to admission.  The pain started out mild but progressed over the weekend.  The pain is  located in the right upper quadrant and does not seem to radiate.  She does have nausea but this is not new and has been ongoing since undergoing chemotherapy.  She denies any vomiting.  She has not been having any fevers, chills or rigors.  She denies any bowel changes including diarrhea, hematochezia or melena.  She denies jaundice, itching or rash.  She has not had pain like this before.    On admission she is afebrile and mildly tachycardic with a pulse of 99.  Blood pressure 110/52, respiratory rate 12 and not hypoxic.  Laboratory evaluation showed a white count of 25.4, hemoglobin 8.8, platelet 102.  Alk phos elevated to 267 (last checked on 7-15 was 187), AST 60, ALT 45, total bilirubin 0.6.  Liver tests slightly improved today renal function normal albumin 1.8 lactic acid normal at 1.5.  Lipase is normal because of her abdominal pain a CT scan of the abdomen and pelvis was obtained and this showed thickening and enhancement of the bile duct proximal to the choledochojejunostomy with surrounding inflammatory change.  There is also fluid with mild adjacent peritoneal thickening and enhancement in the pelvis posterior to the uterus.  Also subtle thickening and enhancement of the portions of the jejunum, sigmoid colon and proximal transverse colon.  She continues to have abdominal pain in the right upper quadrant but  does feel that it is improved with the pain medications that she is receiving while inpatient.            Past Medical History:   Reviewed and edited as appropriate  Past Medical History:   Diagnosis Date     Allergic rhinitis      Cancer (H)     stomach cancer     Depression      Lumbago      Migraine      Other chronic pain     low back     Sacroiliitis (H)     low back pain     Trochanteric bursitis     leg length discrrepancy, hip pain            Past Surgical History:   Reviewed and edited as appropriate   Past Surgical History:   Procedure Laterality Date     ARTHROPLASTY HIP Left 2016     BACK SURGERY      L4-5 decompression     C REPAIR DETACH RETINA,SCLERAL BUCKLE       CATARACT IOL, RT/LT       CHOLECYSTECTOMY N/A 1/28/2020    Procedure: Cholecystectomy;  Surgeon: Yandel Mallory MD;  Location: [facility] OR     ESOPHAGOSCOPY, GASTROSCOPY, DUODENOSCOPY (EGD), COMBINED N/A 3/3/2020    Procedure: ESOPHAGOGASTRODUODENOSCOPY, WITH ENDOSCOPIC US (enoxaparin);  Surgeon: Bakari Plata MD;  Location: [facility]     EYE SURGERY       IR CHEST PORT PLACEMENT > 5 YRS OF AGE  3/26/2020     OPEN REDUCTION INTERNAL FIXATION RODDING INTRAMEDULLARY FEMUR Left 12/23/2014    Procedure: OPEN REDUCTION INTERNAL FIXATION RODDING INTRAMEDULLARY FEMUR;  Surgeon: Arnol Santiago MD;  Location: [facility] OR     ORTHOPEDIC SURGERY       PICC DOUBLE LUMEN PLACEMENT Right 02/07/2020    5 fr double lumen 41 cm     REMOVE HARDWARE LOWER EXTREMITY Left 4/7/2015    Procedure: REMOVE HARDWARE LOWER EXTREMITY;  Surgeon: Arnol Santiago MD;  Location: [facility] OR     REMOVE HARDWARE RODDING INTRAMEDULLARY FEMUR Left 12/17/2015    Procedure: REMOVE HARDWARE RODDING INTRAMEDULLARY FEMUR;  Surgeon: Arnol Santiago MD;  Location: [facility]     RESECT BONE LOWER EXTREMITY Left 12/23/2014    Procedure: RESECT BONE LOWER EXTREMITY;  Surgeon: Arnol Santiago MD;  Location: [facility] OR     WHIPPLE PROCEDURE N/A 1/28/2020    Procedure: Open Whipple procedure and  Cholecystectomy;  Surgeon: Yandel Mallory MD;  Location:  OR              Social History:   The patient lives in [place] with her   Employer: not working    Alcohol: denies  Tobacco: denies  Illicit drugs: denies           Family History:   Reviewed and edited as appropriate  Family History   Problem Relation Age of Onset     Heart Failure Mother 77     Anesthesia Reaction No family hx of      Deep Vein Thrombosis No family hx of              Allergies:   Reviewed and edited as appropriate     Allergies   Allergen Reactions     Gluten Meal Palpitations     Augmentin Rash     Oxycontin [Oxycodone]      Confusion, loopy, oxycodone is tolerated     Pregabalin      interfered with Cymbalta     Topiramate      Tongue numbness, taste alteration, irritable      Acetaminophen Nausea     Urine retention       D[place] Extract      Gabapentin Dizziness and GI Disturbance     Latex Rash     Methadone Fatigue     Mold      Naprosyn [Naproxen] Dizziness and GI Disturbance     Oxymetholone      Seasonal Allergies             Medications:     Current Facility-Administered Medications   Medication     amitriptyline (ELAVIL) tablet 75 mg     calcium carbonate (TUMS) chewable tablet 1,000 mg     citalopram (celeXA) tablet 20 mg     [Held by provider] enoxaparin ANTICOAGULANT (LOVENOX) injection 40 mg     famotidine (PEPCID) tablet 20 mg     HYDROmorphone (DILAUDID) tablet 2 mg     HYDROmorphone (PF) (DILAUDID) injection 1 mg     lidocaine (LMX4) cream     lidocaine 1 % 0.1-1 mL     loperamide (IMODIUM) capsule 2 mg     LORazepam (ATIVAN) tablet 0.5 mg     melatonin tablet 1 mg     multivitamin w/minerals (THERA-VIT-M) tablet 1 tablet     naloxone (NARCAN) injection 0.1-0.4 mg     ondansetron (ZOFRAN-ODT) ODT tab 4 mg    Or     ondansetron (ZOFRAN) injection 4 mg     pantoprazole (PROTONIX) EC tablet 40 mg     piperacillin-tazobactam (ZOSYN) 3.375 g vial to attach to  mL bag     potassium chloride ER (KLOR-CON M) CR  tablet 20 mEq     prochlorperazine (COMPAZINE) injection 10 mg    Or     prochlorperazine (COMPAZINE) tablet 10 mg    Or     prochlorperazine (COMPAZINE) Suppository 25 mg     simethicone (MYLICON) chewable tablet 80 mg     sodium chloride (PF) 0.9% PF flush 3 mL     sodium chloride (PF) 0.9% PF flush 3 mL     Facility-Administered Medications Ordered in Other Encounters   Medication     heparin 100 UNIT/ML injection 5 mL     sodium chloride (PF) 0.9% PF flush 10 mL             Review of Systems:     A complete review of systems was performed and is negative except as noted in the HPI             Physical Exam:   Temp: 97.7  F (36.5  C) Temp src: Axillary BP: 95/60 Pulse: 91   Resp: 16 SpO2: 99 % O2 Device: None (Room air)    Wt:   Wt Readings from Last 2 Encounters:   07/20/20 79.4 kg (175 lb)   07/15/20 79.4 kg (175 lb)        General: Pleasant female in NAD.  Answers appropriately.    HEENT: Head is AT/NC. Sclera anicteric. No conjunctival injection.  Oropharynx is clear, moist and w/o exudate or lesions.  Lungs: Clear to auscultation bilaterally.  No wheezes, rhonchi or crackles.    Heart: Regular rate and rhythm.  No murmurs  Abdomen: Soft, tender RUQ, non-distended.  BS +.  No rebound or peritoneal signs  Extremities: WWP, no pedal edema.   Skin: No jaundice or rash  Neurologic: Grossly non-focal.  CN 2-12 grossly intact.            Data:   Labs and imaging below were independently reviewed and interpreted    LAB WORK:    BMP  Recent Labs   Lab 07/21/20 0329 07/20/20 2304 07/20/20  1509 07/15/20  0932     --  139 139   POTASSIUM 3.4  --  3.4 3.8   CHLORIDE 109  --  109 108   PETRA 7.5*  --  7.7* 7.4*   CO2 27  --  26 24   BUN 7  --  7 10   CR 0.74 0.74 0.71 0.84   GLC 96  --  99 92     CBC  Recent Labs   Lab 07/21/20 0329 07/20/20  2304 07/20/20  1509 07/15/20  0932   WBC 13.4*  --  25.4* 9.8   RBC 2.87*  --  3.26* 3.59*   HGB 7.8*  --  8.8* 9.6*   HCT 25.0*  --  28.8* 31.2*   MCV 87  --  88 87   MCH  27.2  --  27.0 26.7   MCHC 31.2*  --  30.6* 30.8*   RDW 22.8*  --  22.9* 22.3*   PLT 67* 87* 102* 345     INR  Recent Labs   Lab 07/20/20  1509   INR 1.20*     LFTs  Recent Labs   Lab 07/20/20  1509 07/15/20  0932   ALKPHOS 267* 187*   AST 60* 46*   ALT 45 29   BILITOTAL 0.6 0.4   PROTTOTAL 5.2* 5.2*   ALBUMIN 2.2* 2.0*      PANC  Recent Labs   Lab 07/20/20  1509   LIPASE 31*       IMAGING:  EXAMINATION: CT ABDOMEN PELVIS W CONTRAST, 7/20/2020 4:34 PM     TECHNIQUE:  Helical CT images from the lung bases through the  symphysis pubis were obtained with IV contrast. Contrast dose:  iopamidol (ISOVUE-370) solution 108 mL     COMPARISON: PET CT 6/3/2020. CT abdomen 4/13/2020, 3:30 1/20/2018,  2/10/2028, 2/4/2020.     HISTORY: Abd pain, acute, generalized     FINDINGS:     ABDOMEN:  LIVER: Heterogeneous attenuation of the liver parenchyma. No focal  hepatic lesion. Small amount of free fluid around the liver.  BILE DUCTS: No intrahepatic or extrahepatic biliary dilation. There is  mild enhancement of and inflammation surrounding the bile duct  proximal to the choledochojejunostomy, increased compared to prior  examinations.  GALLBLADDER: Surgically absent.  PANCREAS: Post surgical changes of Whipple procedure. Decreased main  pancreatic ductal dilation. Decreased inflammatory changes surrounding  the region of the pancreaticojejunostomy site.  SPLEEN: Within normal limits  ADRENALS: within normal limits.  KIDNEYS: No hydronephrosis. No nephrolithiasis. There is thickening of  Gerota's fascia on the right.     PELVIS:   REPRODUCTIVE ORGANS: There is a fluid posterior right lateral of the  uterus measuring 3.2 x 4.5 cm, with slight thickened and enhancing  peritoneum along the periphery. Difficult to discern if this is simple  fluid collection/ascites as there is significant streak artifact from  left hip arthroplasty limiting evaluation.  URETERS: within normal limits.  BLADDER: Partially decompressed.     BOWEL:  Postoperative changes from Whipple. No dilated loops of small  or large bowel.There is enhancement and is mild thickened appearance  of the jejunum, this may be secondary to lack of distention or could  be inflammatory in nature. Additionally, there is a thickened  appearance of the sigmoid and proximal transverse colon, this again  could be secondary to lack of full distention versus inflammatory  changes. Normal appendix. Sigmoid diverticulosis.  MESENTERIC LYMPH NODES: No enlarged mesenteric lymph nodes.   PERITONEUM: Small volume ascites particularly around the periphery of  the liver and within the pelvis. No free air within the abdomen or  pelvis. Nodular soft tissue thickening in the right inguinal region  (series 5, image 350) measuring 3.8 x 2.1 cm additional area of  nodularity measuring 2.2 x 2.0 cm (series 5, image 174) along the  right aspect of the abdominal wall at the level of the right kidney,  stable compared to prior.  VESSELS/lymph nodes: Pelvic phleboliths. The major vasculature of the  abdomen and pelvis is patent. Mild aortobiiliac atheromatous plaque.  No adenopathy.  RETROPERITONEUM: within normal limits.  ABDOMINAL WALL: Soft tissue thickening along the anterior midline  abdominal scar. Abdominal wall is otherwise within normal limits.     BONES: Postoperative changes of total left hip arthroplasty.Fixation  screw and fusion of the left sacroiliac joint. No suspicious osseous  lesions. Multilevel degenerative changes of visualized lumbar spine.       LUNG BASES: Minimal left lower lobe groundglass opacity.                                                                      IMPRESSION:   1. Thickening and enhancement of the bile duct proximal to the  choledochojejunostomy with surrounding inflammatory change, slightly  increased, as can be seen with cholangitis.  2. Fluid with mild adjacent peritoneal thickening and enhancement in  the pelvis posterior to the uterus. This could represent  small volume  ascites or a subtle sign of peritonitis.  3. Subtle thickening and enhancement of portions of the jejunum,  sigmoid colon, and proximal transverse colon, this may be secondary to  lack of full distention however or mild enterocolitis.  2. Stable soft tissue nodularity along the right abdominal wall and  right inguinal region, this is favored to be secondary to fat necrosis  from prior fluid collections presence but lack of activity on PET/CT.  3. Decreased main pancreatic ductal dilation.  4. Subtle hazy appearance of the mesentery, this is presumed to be  reactive in nature.  5. Mild left lower lobe groundglass opacity, nonspecific, infection  versus atelectasis.        =======================================================================

## 2020-07-21 NOTE — PLAN OF CARE
"  Assumed cares from 23:00 to 07:30  Blood pressure 95/60, pulse 91, temperature 97.7  F (36.5  C), temperature source Axillary, resp. rate 16, height 1.727 m (5' 8\"), weight 79.4 kg (175 lb), last menstrual period 09/23/2014, SpO2 99 %, not currently breastfeeding.     Pt's blood pressures  is soft this morning as recorded above. A/O x 4. She is needing pain medications around the clock. Pt had po Dilaudid x 2, IV x 2. Melatonin given and her Amitriptyline. Up to the bathroom independently and voiding small amounts. MIVF, NS 0.9/L at 100 ML/hr. Continue to monitor pain and treat as needed and follow plan of care.      Problem: Adult Inpatient Plan of Care  Goal: Patient-Specific Goal (Individualization)  Outcome: No Change     Problem: Adult Inpatient Plan of Care  Goal: Absence of Hospital-Acquired Illness or Injury  Outcome: No Change     Problem: Adult Inpatient Plan of Care  Goal: Optimal Comfort and Wellbeing  Outcome: No Change     Problem: Gastrointestinal Condition Comorbidity  Goal: Gastrointestinal Condition  Description: Patient comorbidity will be monitored for signs and symptoms of Gastrointestinal condition.  Problems will be absent, minimized or managed by discharge/transition of care.  Outcome: No Change     Problem: Pain Chronic (Persistent) (Comorbidity Management)  Goal: Acceptable Pain Control and Functional Ability  Outcome: No Change     "

## 2020-07-21 NOTE — PROGRESS NOTES
Patient admitted to:  room 46797  Admitted from: home  Arrived by: bed  Reason for admission: abdominal pain  Patient accompanied by: n/a  Belongings: in room with pt  Teaching: unit routine. What will happen tonight.   Skin double check completed by: Ashley JACOBO Rn and Katie FERNANDEZ. One scab on top of both ankles. Redness on right wrist (per pt skin peeled off). Redness behind back of ears from mask.

## 2020-07-21 NOTE — PROGRESS NOTES
Patient arrived to 5C at 22:40 C/O abdominal pain.  Given 2 mg PO Dilaudid and started Cefepime via port-a-cath.  Stated that she had one BM today.  Up with SBA, no fall history and uses a cane.  Also given IV Flagyl and Levaquin in ED.  Continue with plan of care.

## 2020-07-21 NOTE — PROGRESS NOTES
"[facility], [facility] Progress Note - Hospitalist Service, Gold 11       Date of Admission: 7/20/2020    Brief Hospital Course  This is a 54-year-old female with a past medical history of gastric adenocarcinoma status post chemotherapy, patient has additionally had complications associated with pancreatic cyst requiring Whipple procedure in early 2020.  Patient presented secondary to epigastric pain.  CT imaging was concerning for acute cholangitis.  Associated symptoms included nausea and vomiting.  Patient was started on empiric coverage antibiotics with Flagyl and Levaquin and transition to Flagyl cefepime.     UA revealed UTI without clinical picture.     Noted recent administration of  GCSF on 7/16    Assessment   Principal Problem:    Cholangitis  Active Problems:    MDD (major depressive disorder), recurrent, in partial remission (H)    Low urine output    Leukocytosis    Intraabdominal fluid collection    Gastric adenocarcinoma (H)    Thrombocytopenia (H)    Urinary tract infection      Plan   Will transition to Zosyn for continued abx therapy  Appreciate GI and Oncology recommendations   Discussed with GI this afternoon +/- ERCP  Wean pain meds as tolerated.  Continue home TCA  Hold enoxaparin for thormboctyopenia   FDP/Fibrinogen/smear         Subjective   Chief complaint: \"Abdominal Pain\"    Pain is minimally improved   No fever or chills  No CP or SOB  No Diarrhea  N/V improved    Review of Systems  10 point ROS performed and is negative except as mention above     Physical Exam  Vital signs:  Temp: 97.2  F (36.2  C) Temp src: Axillary BP: 120/53 Pulse: 95   Resp: 20 SpO2: 96 % O2 Device: None (Room air)   Height: 172.7 cm (5' 8\") Weight: 77.5 kg (170 lb 12.8 oz)  Estimated body mass index is 25.97 kg/m  as calculated from the following:    Height as of this encounter: 1.727 m (5' 8\").    Weight as of this encounter: 77.5 kg (170 lb 12.8 oz).        Physical " Exam  Constitutional:       Appearance: Normal appearance.   HENT:      Head: Normocephalic and atraumatic.      Mouth/Throat:      Mouth: Mucous membranes are moist.   Cardiovascular:      Rate and Rhythm: Normal rate and regular rhythm.      Heart sounds: No murmur. No friction rub. No gallop.    Abdominal:      General: Abdomen is flat. Bowel sounds are normal. There is no distension.      Palpations: There is no mass.      Tenderness: There is abdominal tenderness. There is guarding.   Skin:     Coloration: Skin is not jaundiced.   Neurological:      General: No focal deficit present.      Mental Status: She is alert and oriented to person, place, and time.      Cranial Nerves: No cranial nerve deficit.      Sensory: No sensory deficit.      Motor: No weakness.   Psychiatric:         Mood and Affect: Mood normal.         Behavior: Behavior normal.           Labs  Lab Results   Component Value Date    WBC 13.4 (H) 07/21/2020    HGB 7.8 (L) 07/21/2020    HCT 25.0 (L) 07/21/2020    PLT 67 (L) 07/21/2020     07/21/2020    POTASSIUM 3.4 07/21/2020    CHLORIDE 109 07/21/2020    CO2 27 07/21/2020    BUN 7 07/21/2020    CR 0.74 07/21/2020    GLC 96 07/21/2020    SED 27 07/20/2020    NTBNPI 412 01/30/2020    NTBNP 184 (H) 01/17/2020    TROPI <0.015 05/21/2020    AST 49 (H) 07/21/2020    ALT 39 07/21/2020    ALKPHOS 216 (H) 07/21/2020    BILITOTAL 0.5 07/21/2020    INR 1.20 (H) 07/20/2020         Imaging   Results for orders placed or performed during the hospital encounter of 07/20/20   CT Abdomen Pelvis w Contrast    Impression    IMPRESSION:   1. Thickening and enhancement of the bile duct proximal to the  choledochojejunostomy with surrounding inflammatory change, slightly  increased, as can be seen with cholangitis.  2. Fluid with mild adjacent peritoneal thickening and enhancement in  the pelvis posterior to the uterus. This could represent small volume  ascites or a subtle sign of peritonitis.  3. Subtle  thickening and enhancement of portions of the jejunum,  sigmoid colon, and proximal transverse colon, this may be secondary to  lack of full distention however or mild enterocolitis.  2. Stable soft tissue nodularity along the right abdominal wall and  right inguinal region, this is favored to be secondary to fat necrosis  from prior fluid collections presence but lack of activity on PET/CT.  3. Decreased main pancreatic ductal dilation.  4. Subtle hazy appearance of the mesentery, this is presumed to be  reactive in nature.  5. Mild left lower lobe groundglass opacity, nonspecific, infection  versus atelectasis.    I have personally reviewed the examination and initial interpretation  and I agree with the findings.    MD Julien ORTEGA MD on 7/21/2020 at 12:41 PM    Time Spent: 25+ minutes

## 2020-07-21 NOTE — PHARMACY-ADMISSION MEDICATION HISTORY
Admission medication history for the July 20, 2020 admission is complete.     Interview Sources:    Patient    Dispense Report    Changes made to PTA medication list (reason)  Added:     Tramadol 50 mg tabs; 1T PO Q6H PRN severe pain (per patient and fill hx)  Deleted:     Benzocaine-menthol 15-3.6 mg lozenge; place 1 inside cheek Q1H PRN sore throat (stopped per patient and fill hx)    Calcium-Vit D 500-200 mg-unit tabs; 1T PO TID w/ meals (stopped per patient)    Doxepin 10 mg/mL solution; 1 mL PO BID (stopped per patient)    Fluticasone 50 mcg/act nasal spray; 2 sprays in both nostrils daily PRN (stopped per patient)    Hydroxyzine 10 mg tabs; 1T PO TID PRN (stopped per patient and fill hx)    Magic Mouthwash; 10 mL PO Q6H PRN mouth sores (stopped per patient)    Multivitamin tabs; 1T PO every day (stopped per patient)    Olanzapine 5 mg ODT; 1T PO at bedtime (stopped per patient and fill hx)    Ondansetron 4 mg ODT; 1T PO Q6H PRN N/V (stopped per patient and fill hx)    Senna-docusate 8.6-50 mg tabs; 1T PO every day PRN constipation (stopped per patient)    Sumatriptan 100 mg tabs; 1T PO at onset of migraine (stopped per patient and fill hx)    Tizanidine 4 mg tabs; 1T PO BID PRN (stopped per patient and fill hx)  Changed:     Loperamide 2 mg caps; 1C PO QID PRN diarrhea --> 2C PO QID PRN diarrhea (per patient)    Additional medication history information:     Reliability of Source: good; patient new all meds by name and could verify last dose    Current Outpatient Pharmacy: [facility] [address]    Last infusion for C7 Taxotere-Leucovorin-Oxaliplatin-Fluorouracil was 7/15/20 at the [facility]    Prior to Admission Medication List:  Prior to Admission medications    Medication Sig Last Dose Taking? Auth Provider   amitriptyline (ELAVIL) 25 MG tablet Take 3 tablets by mouth At Bedtime 7/19/2020 at  Yes Reported, Patient   citalopram (CELEXA) 20 MG tablet Take 20 mg by mouth every  morning  7/20/2020 at AM Yes Reported, Patient   diclofenac (FLECTOR) 1.3 % patch Place 1 patch onto the skin daily as needed  Past Month Yes Reported, Patient   famotidine (PEPCID) 20 MG tablet Take 1 tablet (20 mg) by mouth 2 times daily 7/20/2020 at AM Yes Vazquez Sewell PA   loperamide (IMODIUM) 2 MG capsule Take 4 mg by mouth 4 times daily as needed for diarrhea  Past Week Yes Reported, Patient   LORazepam (ATIVAN) 0.5 MG tablet Take 1 tablet (0.5 mg) by mouth every 4 hours as needed (Anxiety, Nausea/Vomiting or Sleep) 7/20/2020 at AM Yes Magaly Pollard MD   meclizine (ANTIVERT) 25 MG tablet Take 1 tablet (25 mg) by mouth 3 times daily as needed for dizziness Past Month Yes Julienne Eden PA-C   ondansetron (ZOFRAN) 8 MG tablet Take 1 tablet (8 mg) by mouth every 8 hours as needed (Nausea/Vomiting) 7/20/2020 at AM Yes Precious Scott MD   oxyCODONE (ROXICODONE) 5 MG tablet Take 1 tablet (5 mg) by mouth 3 times daily as needed for pain (. Stop tramadol. Cannot take with tramadol.) Past Week Yes Albino Chauhan MD   pantoprazole (PROTONIX) 40 MG EC tablet TAKE ONE TABLET BY MOUTH TWICE A DAY 7/20/2020 at AM Yes June Walsh PA-C   potassium chloride ER (KLOR-CON M) 20 MEQ CR tablet Take 1 tablet (20 mEq) by mouth daily 7/20/2020 at AM Yes Julienne Eden PA-C   simethicone 80 MG TABS Take 1 tablet by mouth every 6 hours as needed (bloating, gas, belching) 7/20/2020 at AM Yes Magaly Pollard MD   traMADol (ULTRAM) 50 MG tablet Take 50 mg by mouth every 6 hours as needed for severe pain Past Month Yes Unknown, Entered By History   dexamethasone (DECADRON) 4 MG tablet Take 1 tablet (4mg) the day before, day of, and 2 days after chemotherapy 7/17/2020  Vazquez Sewell PA   naloxone (NARCAN) 4 MG/0.1ML nasal spray Spray 1 spray (4 mg) into one nostril alternating nostrils once as needed for opioid reversal every 2-3 minutes until assistance arrives never used  Albino Chauhan,  MD     Medication history completed by:   Radha Gresham, Pharmacy Intern

## 2020-07-21 NOTE — CONSULTS
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    Patient states that she has not been feeling well over the past few days prior to admission. She developed RUQ and epigastrium abdominal pain 2 days ago which is progressively worsened. She reports associated nausea but no vomiting. Feeling fatigue with decreased appetite. Denies fever or chills. Denies diarrhea, changes in BM, or black/bloody stool. No yellow discoloration of skin or eyes.    She states that she does not have issue with her last infusion which was on 7/16/20. She usually feels fatigue and has mild diarrhea for 1-2 days after infusion and they will then resolve. She has been dealing with neuropathy in hands and feet which she states are mainly tingling and numbness, has been stable recently. Has rashes and some skin peeling on hands but are getting better. No pain in mouth or oral ulcer.    Oncologic History:  Stage IIB (pT4a N0) adenocarcinoma of stomach  11/2018: Found to have pancreatic duct dilatation and pancreatic cyst   05/2019: Developed persistent nausea/vomiting and abdominal discomfort  10/29/19: EUS found increase in size of the cyst as well as dilation of the main pancreatic duct. FNA showed mucinous epithelium, no malignancy.  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 There was no pancreatic ductal  neoplasm but pathology revealed poorly differentiated adenocarcinoma of stomach with poorly cohesive/signet ring cell type with proximal gastric mucosal and serosal margins being positive. There was lymphovascular and perineural invasion seen. Lymph nodes were sampled and all were benign (0/22). HER-2/christine FISH was not amplified.  3/3/20: EUS with FNA of left gastric lymph node and biopsy of gastrojejunal anastomosis as well as lesser curvature of the stomach also did not show any malignancy.  3/13/20: PET/CT showed soft tissue streaky density with increased FDG uptake adjacent to the pancreaticojejunostomy which could be neoplastic in origin.  3/27/20: Started on neoadjuvant intent FLOT. Developed neutropenia after cycle 3.  6/3/20: PET/CT after cycle 4 showed mild residual uptake at the site of gastrojejunal anastomosis. There is almost resolution of soft tissue nodule next to the pancreaticoduodenal anastomosis without FDG uptake. Plan was made to continue another 4 cycle before proceeding to surgery.  6/11/20: Decreased dose of oxaliplatin due to neuropathy/cold sensitivity. Decreased 5FU to 85% with cycle #6 due to mucositis/diarrhea/taste changes.    Review of Systems:  A comprehensive ROS was performed and found to be negative or non-contributory with the exception of that noted in the HPI above.    Past Medical History:  Past Medical History:   Diagnosis Date     Allergic rhinitis      Cancer (H)     stomach cancer     Depression      Lumbago      Migraine      Other chronic pain     low back     Sacroiliitis (H)     low back pain     Trochanteric bursitis     leg length discrrepancy, hip pain       Past Surgical History:  Past Surgical History:   Procedure Laterality Date     ARTHROPLASTY HIP Left 2016     BACK SURGERY      L4-5 decompression     C REPAIR DETACH RETINA,SCLERAL BUCKLE       CATARACT IOL, RT/LT       CHOLECYSTECTOMY N/A 1/28/2020    Procedure: Cholecystectomy;  Surgeon: Yandel Mallory MD;   Location: [facility] OR     ESOPHAGOSCOPY, GASTROSCOPY, DUODENOSCOPY (EGD), COMBINED N/A 3/3/2020    Procedure: ESOPHAGOGASTRODUODENOSCOPY, WITH ENDOSCOPIC US (enoxaparin);  Surgeon: Bakari Plata MD;  Location: [facility] GI     EYE SURGERY       IR CHEST PORT PLACEMENT > 5 YRS OF AGE  3/26/2020     OPEN REDUCTION INTERNAL FIXATION RODDING INTRAMEDULLARY FEMUR Left 2014    Procedure: OPEN REDUCTION INTERNAL FIXATION RODDING INTRAMEDULLARY FEMUR;  Surgeon: Arnol Santiago MD;  Location: [facility]     ORTHOPEDIC SURGERY       PICC DOUBLE LUMEN PLACEMENT Right 2020    5 fr double lumen 41 cm     REMOVE HARDWARE LOWER EXTREMITY Left 2015    Procedure: REMOVE HARDWARE LOWER EXTREMITY;  Surgeon: Arnol Santiago MD;  Location: [facility]     REMOVE HARDWARE RODDING INTRAMEDULLARY FEMUR Left 2015    Procedure: REMOVE HARDWARE RODDING INTRAMEDULLARY FEMUR;  Surgeon: Arnol Santiago MD;  [facility]     RESECT BONE LOWER EXTREMITY Left 2014    Procedure: RESECT BONE LOWER EXTREMITY;  Surgeon: Arnol Santiago MD;  Location: [facility]     WHIPPLE PROCEDURE N/A 2020    Procedure: Open Whipple procedure and Cholecystectomy;  Surgeon: Yandel Mallory MD;  Location: [facility] OR       Social History:  Social History     Socioeconomic History     Marital status:      Spouse name: None     Number of children: None     Years of education: None     Highest education level: None   Occupational History     None   Social Needs     Financial resource strain: None     Food insecurity     Worry: None     Inability: None     Transportation needs     Medical: None     Non-medical: None   Tobacco Use     Smoking status: Former Smoker     Packs/day: 0.50     Years: 25.00     Pack years: 12.50     Types: Cigarettes     Last attempt to quit: 2020     Years since quittin.5     Smokeless tobacco: Never Used   Substance and Sexual Activity     Alcohol use: No     Drug use: No     Sexual activity: None   Lifestyle      Physical activity     Days per week: None     Minutes per session: None     Stress: None   Relationships     Social connections     Talks on phone: None     Gets together: None     Attends Jainism service: None     Active member of club or organization: None     Attends meetings of clubs or organizations: None     Relationship status: None     Intimate partner violence     Fear of current or ex partner: None     Emotionally abused: None     Physically abused: None     Forced sexual activity: None   Other Topics Concern     Parent/sibling w/ CABG, MI or angioplasty before 65F 55M? Not Asked   Social History Narrative     None        Family History  Family History   Problem Relation Age of Onset     Heart Failure Mother 77     Anesthesia Reaction No family hx of      Deep Vein Thrombosis No family hx of        Outpatient Medications:  amitriptyline (ELAVIL) 25 MG tablet, Take 3 tablets by mouth At Bedtime  citalopram (CELEXA) 20 MG tablet, Take 20 mg by mouth every morning   diclofenac (FLECTOR) 1.3 % patch, Place 1 patch onto the skin daily as needed   famotidine (PEPCID) 20 MG tablet, Take 1 tablet (20 mg) by mouth 2 times daily  loperamide (IMODIUM) 2 MG capsule, Take 4 mg by mouth 4 times daily as needed for diarrhea   LORazepam (ATIVAN) 0.5 MG tablet, Take 1 tablet (0.5 mg) by mouth every 4 hours as needed (Anxiety, Nausea/Vomiting or Sleep)  meclizine (ANTIVERT) 25 MG tablet, Take 1 tablet (25 mg) by mouth 3 times daily as needed for dizziness  ondansetron (ZOFRAN) 8 MG tablet, Take 1 tablet (8 mg) by mouth every 8 hours as needed (Nausea/Vomiting)  oxyCODONE (ROXICODONE) 5 MG tablet, Take 1 tablet (5 mg) by mouth 3 times daily as needed for pain (. Stop tramadol. Cannot take with tramadol.)  pantoprazole (PROTONIX) 40 MG EC tablet, TAKE ONE TABLET BY MOUTH TWICE A DAY  potassium chloride ER (KLOR-CON M) 20 MEQ CR tablet, Take 1 tablet (20 mEq) by mouth daily  simethicone 80 MG TABS, Take 1 tablet by mouth  "every 6 hours as needed (bloating, gas, belching)  traMADol (ULTRAM) 50 MG tablet, Take 50 mg by mouth every 6 hours as needed for severe pain  dexamethasone (DECADRON) 4 MG tablet, Take 1 tablet (4mg) the day before, day of, and 2 days after chemotherapy  naloxone (NARCAN) 4 MG/0.1ML nasal spray, Spray 1 spray (4 mg) into one nostril alternating nostrils once as needed for opioid reversal every 2-3 minutes until assistance arrives      Physical Exam:    Blood pressure 95/60, pulse 91, temperature 97.7  F (36.5  C), temperature source Axillary, resp. rate 16, height 1.727 m (5' 8\"), weight 79.4 kg (175 lb), last menstrual period 09/23/2014, SpO2 99 %, not currently breastfeeding.  General: alert and cooperative, lying in bed, no acute distress  HEENT: sclera anicteric, EOMI, MMM. No oral ulcer.  Neck: supple, normal ROM. No cervical or supraclavicular lymphadenopathy.  CV: RRR, no murmurs  Resp: normal respiratory effort on ambient air. Scattered fine crackles on R lung field.  GI: midline surgical scar. soft, non-distended, bowel sounds present and normoactive. Tenderness to palpation on RUQ without rebound.  MSK: warm and well-perfused. No edema.  Skin: Erythematous rashes on dorsum of both hands. No petechiae.  Neuro: Alert and interactive, moves all extremities equally, no focal deficits    Labs & Studies: I personally reviewed the following studies:  ROUTINE LABS (Last four results):  CMP  Recent Labs   Lab 07/21/20  0329 07/20/20  2304 07/20/20  1509 07/15/20  0932     --  139 139   POTASSIUM 3.4  --  3.4 3.8   CHLORIDE 109  --  109 108   CO2 27  --  26 24   ANIONGAP 3  --  4 6   GLC 96  --  99 92   BUN 7  --  7 10   CR 0.74 0.74 0.71 0.84   GFRESTIMATED >90 >90 >90 79   GFRESTBLACK >90 >90 >90 >90   PETRA 7.5*  --  7.7* 7.4*   PROTTOTAL  --   --  5.2* 5.2*   ALBUMIN  --   --  2.2* 2.0*   BILITOTAL  --   --  0.6 0.4   ALKPHOS  --   --  267* 187*   AST  --   --  60* 46*   ALT  --   --  45 29     CBC  Recent " Labs   Lab 07/21/20  0329 07/20/20  2304 07/20/20  1509 07/15/20  0932   WBC 13.4*  --  25.4* 9.8   RBC 2.87*  --  3.26* 3.59*   HGB 7.8*  --  8.8* 9.6*   HCT 25.0*  --  28.8* 31.2*   MCV 87  --  88 87   MCH 27.2  --  27.0 26.7   MCHC 31.2*  --  30.6* 30.8*   RDW 22.8*  --  22.9* 22.3*   PLT 67* 87* 102* 345     INR  Recent Labs   Lab 07/20/20  1509   INR 1.20*       Assessment & Plan:   Nathalie Bashir is a 54 year old female with PMHx of signet ring-cell gastric adenocarcinoma, GERD, and depression who is admitted to medicine team on 7/20/20 with acute cholangitis. Oncology was consulted for management of gastric adenocarcinoma and chemotherapy-related symptoms.    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 current plan of treatment if to complete neoadjuvant intent FLOT for the total of 8 cycles before proceeding with surgery. Last chemotherapy session was 7/16/20 (7th cycle). There is no evidence of progression on CT obtained on admission. Small amount of ascites, not amenable for sampling. She experienced worsening neuropathy, rashes/mucositis as adverse effects from chemotherapy. 5-Fluorouracil and Oxaliplatin dose decreased. Now improved with mild residual symptoms.    Normocytic anemia  Thrombocytopenia  Worsening anemia and thrombocytopenia from baseline. No acute bleeding. This is likely in the setting of recent chemotherapy, acute illness (possible infection), and IVF. Has worsening cytopenia today with mildly elevated INR, will work-up to rule-out MAHA (DIC). Patient does not appear septic.  - Fibrinogen and PBS (ordered)    RUQ abdominal pain  Worsening RUQ abdominal pain with the absence of fever or jaundice. with CT findings suspicion for cholangitis with marginally elevated ALP. It is unclear if her symptoms are related to cholangitis in the  absence of biliary dilation.  - Infectious work-up and antibiotic per primary team  - GI consulted, no urgent ERCP indicated    Malnutrition secondary to inadequate protein-energy intake   Albumin 1.8 g/dL. Decreased oral intake mainly from abdominal pain and nausea and is having ongoing weight loss. Had recent visit with dietitian and goals were set to aim for 2000kcal and 85g protein/day with weight maintenance.  - Monitor weight and PO intake  - Will address nutrition problem with patient once pain is improved  - Continue follow-up with dietitian outpatient    We will continue to follow this patient. Please do not hesitate to page with any questions or concerns.    Patient was seen and plan of care was discussed with attending physician Dr. Chana Soni.    Bebo Bo MD  Internal Medicine PGY-2  [facility]  P: [phone]

## 2020-07-22 LAB
ALBUMIN SERPL-MCNC: 2 G/DL (ref 3.4–5)
ALP SERPL-CCNC: 235 U/L (ref 40–150)
ALT SERPL W P-5'-P-CCNC: 42 U/L (ref 0–50)
AST SERPL W P-5'-P-CCNC: 58 U/L (ref 0–45)
BILIRUB DIRECT SERPL-MCNC: 0.2 MG/DL (ref 0–0.2)
BILIRUB SERPL-MCNC: 0.5 MG/DL (ref 0.2–1.3)
COPATH REPORT: NORMAL
D DIMER PPP FEU-MCNC: 1.2 UG/ML FEU (ref 0–0.5)
FIBRINOGEN PPP-MCNC: 348 MG/DL (ref 200–420)
PROT SERPL-MCNC: 5.2 G/DL (ref 6.8–8.8)

## 2020-07-22 PROCEDURE — 85379 FIBRIN DEGRADATION QUANT: CPT | Performed by: INTERNAL MEDICINE

## 2020-07-22 PROCEDURE — 25000128 H RX IP 250 OP 636: Performed by: PHYSICIAN ASSISTANT

## 2020-07-22 PROCEDURE — 99233 SBSQ HOSP IP/OBS HIGH 50: CPT | Mod: GC | Performed by: INTERNAL MEDICINE

## 2020-07-22 PROCEDURE — 80076 HEPATIC FUNCTION PANEL: CPT | Performed by: PHYSICIAN ASSISTANT

## 2020-07-22 PROCEDURE — 85384 FIBRINOGEN ACTIVITY: CPT | Performed by: INTERNAL MEDICINE

## 2020-07-22 PROCEDURE — 99232 SBSQ HOSP IP/OBS MODERATE 35: CPT | Performed by: INTERNAL MEDICINE

## 2020-07-22 PROCEDURE — 12000012 ZZH R&B MS OVERFLOW UMMC

## 2020-07-22 PROCEDURE — 25000132 ZZH RX MED GY IP 250 OP 250 PS 637: Mod: GY | Performed by: PHYSICIAN ASSISTANT

## 2020-07-22 PROCEDURE — 25000132 ZZH RX MED GY IP 250 OP 250 PS 637: Mod: GY | Performed by: HOSPITALIST

## 2020-07-22 PROCEDURE — 25000128 H RX IP 250 OP 636: Performed by: INTERNAL MEDICINE

## 2020-07-22 RX ADMIN — ONDANSETRON 4 MG: 4 TABLET, ORALLY DISINTEGRATING ORAL at 16:45

## 2020-07-22 RX ADMIN — PROCHLORPERAZINE MALEATE 10 MG: 5 TABLET ORAL at 22:01

## 2020-07-22 RX ADMIN — CITALOPRAM HYDROBROMIDE 20 MG: 20 TABLET, FILM COATED ORAL at 09:04

## 2020-07-22 RX ADMIN — HYDROMORPHONE HYDROCHLORIDE 2 MG: 2 TABLET ORAL at 08:08

## 2020-07-22 RX ADMIN — HYDROMORPHONE HYDROCHLORIDE 1 MG: 1 INJECTION, SOLUTION INTRAMUSCULAR; INTRAVENOUS; SUBCUTANEOUS at 10:17

## 2020-07-22 RX ADMIN — HYDROMORPHONE HYDROCHLORIDE 2 MG: 2 TABLET ORAL at 13:28

## 2020-07-22 RX ADMIN — PIPERACILLIN AND TAZOBACTAM 3.38 G: 3; .375 INJECTION, POWDER, FOR SOLUTION INTRAVENOUS at 15:59

## 2020-07-22 RX ADMIN — HYDROMORPHONE HYDROCHLORIDE 1 MG: 1 INJECTION, SOLUTION INTRAMUSCULAR; INTRAVENOUS; SUBCUTANEOUS at 17:56

## 2020-07-22 RX ADMIN — PANTOPRAZOLE SODIUM 40 MG: 40 TABLET, DELAYED RELEASE ORAL at 09:04

## 2020-07-22 RX ADMIN — SIMETHICONE 80 MG: 80 TABLET, CHEWABLE ORAL at 20:03

## 2020-07-22 RX ADMIN — HYDROMORPHONE HYDROCHLORIDE 2 MG: 2 TABLET ORAL at 20:03

## 2020-07-22 RX ADMIN — PIPERACILLIN AND TAZOBACTAM 3.38 G: 3; .375 INJECTION, POWDER, FOR SOLUTION INTRAVENOUS at 09:04

## 2020-07-22 RX ADMIN — HYDROMORPHONE HYDROCHLORIDE 2 MG: 2 TABLET ORAL at 16:43

## 2020-07-22 RX ADMIN — HYDROMORPHONE HYDROCHLORIDE 1 MG: 1 INJECTION, SOLUTION INTRAMUSCULAR; INTRAVENOUS; SUBCUTANEOUS at 22:01

## 2020-07-22 RX ADMIN — SIMETHICONE 80 MG: 80 TABLET, CHEWABLE ORAL at 13:45

## 2020-07-22 RX ADMIN — HYDROMORPHONE HYDROCHLORIDE 2 MG: 2 TABLET ORAL at 04:58

## 2020-07-22 RX ADMIN — PIPERACILLIN AND TAZOBACTAM 3.38 G: 3; .375 INJECTION, POWDER, FOR SOLUTION INTRAVENOUS at 20:03

## 2020-07-22 RX ADMIN — PIPERACILLIN AND TAZOBACTAM 3.38 G: 3; .375 INJECTION, POWDER, FOR SOLUTION INTRAVENOUS at 02:11

## 2020-07-22 RX ADMIN — HYDROMORPHONE HYDROCHLORIDE 2 MG: 2 TABLET ORAL at 00:16

## 2020-07-22 RX ADMIN — AMITRIPTYLINE HYDROCHLORIDE 75 MG: 75 TABLET, FILM COATED ORAL at 22:01

## 2020-07-22 RX ADMIN — MULTIPLE VITAMINS W/ MINERALS TAB 1 TABLET: TAB at 09:04

## 2020-07-22 RX ADMIN — POTASSIUM CHLORIDE 20 MEQ: 750 TABLET, EXTENDED RELEASE ORAL at 09:04

## 2020-07-22 RX ADMIN — FAMOTIDINE 20 MG: 20 TABLET ORAL at 09:04

## 2020-07-22 RX ADMIN — PANTOPRAZOLE SODIUM 40 MG: 40 TABLET, DELAYED RELEASE ORAL at 20:03

## 2020-07-22 RX ADMIN — FAMOTIDINE 20 MG: 20 TABLET ORAL at 20:03

## 2020-07-22 ASSESSMENT — ACTIVITIES OF DAILY LIVING (ADL)
ADLS_ACUITY_SCORE: 12

## 2020-07-22 ASSESSMENT — PAIN DESCRIPTION - DESCRIPTORS
DESCRIPTORS: ACHING
DESCRIPTORS: ACHING
DESCRIPTORS: ACHING;DISCOMFORT
DESCRIPTORS: ACHING;DISCOMFORT
DESCRIPTORS: ACHING

## 2020-07-22 ASSESSMENT — MIFFLIN-ST. JEOR: SCORE: 1415.09

## 2020-07-22 NOTE — PLAN OF CARE
VSS. Afebrile. Up ad nelida. Dilaudid PO x3. Dilaudid IV x2. Simethicone x1. Eating some jello and clear food. Continue to monitor.    Problem: Adult Inpatient Plan of Care  Goal: Plan of Care Review  Outcome: No Change     Problem: Adult Inpatient Plan of Care  Goal: Patient-Specific Goal (Individualization)  Outcome: No Change     Problem: Adult Inpatient Plan of Care  Goal: Absence of Hospital-Acquired Illness or Injury  Outcome: No Change     Problem: Adult Inpatient Plan of Care  Goal: Optimal Comfort and Wellbeing  Outcome: No Change     Problem: Adult Inpatient Plan of Care  Goal: Readiness for Transition of Care  Outcome: No Change     Problem: Adult Inpatient Plan of Care  Goal: Rounds/Family Conference  Outcome: No Change     Problem: Cardiac Disease Comorbidity  Goal: Cardiac Disease  Description: Patient comorbidity will be monitored for signs and symptoms of Cardiac Disease.  Problems will be absent, minimized or managed by discharge/transition of care.  Outcome: No Change     Problem: Gastrointestinal Condition Comorbidity  Goal: Gastrointestinal Condition  Description: Patient comorbidity will be monitored for signs and symptoms of Gastrointestinal condition.  Problems will be absent, minimized or managed by discharge/transition of care.  Outcome: No Change     Problem: Pain Chronic (Persistent) (Comorbidity Management)  Goal: Acceptable Pain Control and Functional Ability  Outcome: No Change     Problem: Pain Acute  Goal: Optimal Pain Control  Outcome: No Change

## 2020-07-22 NOTE — PLAN OF CARE
Pt complained of abdominal pain and requested for Dilaudid; she took 2 mg po and 1 mg iv 3 hours later; abx given iv; no stool; good urine out put; was able to eat regular diet; afebrile; vital signs are stable; independent; monitor pt closely and continue plan of care.    Problem: Adult Inpatient Plan of Care  Goal: Plan of Care Review  7/21/2020 2132 by Beth Kearney, RN  Outcome: No Change  Flowsheets (Taken 7/21/2020 2132)  Plan of Care Reviewed With: patient     Problem: Adult Inpatient Plan of Care  Goal: Patient-Specific Goal (Individualization)  Outcome: No Change     Problem: Adult Inpatient Plan of Care  Goal: Absence of Hospital-Acquired Illness or Injury  Outcome: No Change     Problem: Adult Inpatient Plan of Care  Goal: Optimal Comfort and Wellbeing  Outcome: No Change     Problem: Adult Inpatient Plan of Care  Goal: Readiness for Transition of Care  Outcome: No Change     Problem: Cardiac Disease Comorbidity  Goal: Cardiac Disease  Description: Patient comorbidity will be monitored for signs and symptoms of Cardiac Disease.  Problems will be absent, minimized or managed by discharge/transition of care.  Outcome: No Change     Problem: Pain Chronic (Persistent) (Comorbidity Management)  Goal: Acceptable Pain Control and Functional Ability  7/21/2020 2132 by Beth Kearney, RN  Outcome: No Change     Problem: Pain Acute  Goal: Optimal Pain Control  Outcome: No Change

## 2020-07-22 NOTE — PROGRESS NOTES
Hematology / Oncology  Daily Progress Note   Date of Service: 7/22/2020    Patient: Nathalie Bashir  MRN: [id number]  Admission Date: 7/20/2020  Hospital Day # 2    Assessment & Plan:   Nathalie Bashir is a 54 year old female with PMHx of signet ring-cell gastric adenocarcinoma, GERD, and depression who is admitted to medicine team on 7/20/20 with acute cholangitis. Oncology was consulted for management of gastric adenocarcinoma and chemotherapy-related symptoms.     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 current plan of treatment if to complete neoadjuvant intent FLOT for the total of 8 cycles before proceeding with surgery. Last chemotherapy session was 7/16/20 (7th cycle). There is no evidence of progression on CT obtained on admission. Small amount of ascites, not amenable for sampling. She experienced worsening neuropathy, rashes/mucositis as adverse effects from chemotherapy. 5-Fluorouracil and Oxaliplatin dose decreased. Now improved with mild residual symptoms.  - Oncology clinic follow-up with 7/28 with plan for infusion on 7/29/20     Normocytic anemia  Thrombocytopenia  Worsening anemia and thrombocytopenia from baseline. No acute bleeding. This is likely in the setting of recent chemotherapy, acute illness (possible infection), and IVF. Has worsening cytopenia initially with mildly elevated INR, labs not suggestive of MAHA but with low reticulocyte index. Blood counts stable.  - PBS pending    Malnutrition secondary to inadequate protein-energy intake   Albumin 1.8 g/dL. Decreased oral intake mainly from abdominal pain and nausea and is having ongoing weight loss. Had recent visit with dietitian and goals were set to aim for 2000kcal and 85g protein/day with weight maintenance.  - Discussed with patient regarding nutrition status  - Monitor  "weight and PO intake  - Continue follow-up with dietitian outpatient     RUQ abdominal pain  Worsening RUQ abdominal pain with the absence of fever or jaundice. with CT findings suspicion for cholangitis with marginally elevated ALP. It is unclear if her symptoms are related to cholangitis in the absence of biliary dilation.  - Infectious work-up and antibiotic per primary team  - GI consulted, no urgent ERCP indicated    Patient is seen and plan of care was discussed with attending physician Dr. Shmuel Kapoor.    Bebo Bo MD  Internal Medicine PGY-2  [facility]  P: [phone]  ___________________________________________________________________    Subjective & Interval History:    No acute events noted overnight. Reports that her abdominal pain is slightly improved today. Denies vomiting or diarrhea. Feeling better and regaining appetite.    Last 24 hr care team notes reviewed.   ROS: 4 point ROS including Respiratory, CV, GI and , other than that noted above, is negative.     Physical Exam:    Blood pressure 108/67, pulse 86, temperature 98.4  F (36.9  C), temperature source Oral, resp. rate 16, height 1.727 m (5' 8\"), weight 76.2 kg (167 lb 14.4 oz), last menstrual period 09/23/2014, SpO2 100 %, not currently breastfeeding.    General: alert and cooperative, lying in bed, no acute distress  HEENT: sclera anicteric, EOMI, MMM. No oral ulcer.  Neck: supple, normal ROM. No cervical or supraclavicular lymphadenopathy.  CV: RRR, no murmurs  Resp: normal respiratory effort on ambient air. Scattered fine crackles on R lung field.  GI: midline surgical scar. soft, non-distended, bowel sounds present and normoactive. Tenderness to palpation on RUQ without rebound.  MSK: warm and well-perfused. No edema.  Skin: Erythematous rashes on dorsum of both hands. No petechiae.  Neuro: Alert and interactive, moves all extremities equally, no focal deficits    Labs & Studies: I personally reviewed the " following studies:  ROUTINE LABS (Last four results):  CMP  Recent Labs   Lab 07/22/20  0903 07/21/20  0329 07/20/20  2304 07/20/20  1509   NA  --  139  --  139   POTASSIUM  --  3.4  --  3.4   CHLORIDE  --  109  --  109   CO2  --  27  --  26   ANIONGAP  --  3  --  4   GLC  --  96  --  99   BUN  --  7  --  7   CR  --  0.74 0.74 0.71   GFRESTIMATED  --  >90 >90 >90   GFRESTBLACK  --  >90 >90 >90   PETRA  --  7.5*  --  7.7*   PROTTOTAL 5.2* 4.6*  --  5.2*   ALBUMIN 2.0* 1.8*  --  2.2*   BILITOTAL 0.5 0.5  --  0.6   ALKPHOS 235* 216*  --  267*   AST 58* 49*  --  60*   ALT 42 39  --  45     CBC  Recent Labs   Lab 07/21/20  1309 07/21/20  0329 07/20/20  2304 07/20/20  1509   WBC 8.4 13.4*  --  25.4*   RBC 2.79* 2.87*  --  3.26*   HGB 7.5* 7.8*  --  8.8*   HCT 24.4* 25.0*  --  28.8*   MCV 88 87  --  88   MCH 26.9 27.2  --  27.0   MCHC 30.7* 31.2*  --  30.6*   RDW 22.7* 22.8*  --  22.9*   PLT 72* 67* 87* 102*     INR  Recent Labs   Lab 07/20/20  1509   INR 1.20*     Medications list for reference:  Current Facility-Administered Medications   Medication     amitriptyline (ELAVIL) tablet 75 mg     calcium carbonate (TUMS) chewable tablet 1,000 mg     citalopram (celeXA) tablet 20 mg     [Held by provider] enoxaparin ANTICOAGULANT (LOVENOX) injection 40 mg     famotidine (PEPCID) tablet 20 mg     HYDROmorphone (DILAUDID) tablet 2 mg     HYDROmorphone (PF) (DILAUDID) injection 1 mg     lidocaine (LMX4) cream     lidocaine 1 % 0.1-1 mL     loperamide (IMODIUM) capsule 2 mg     LORazepam (ATIVAN) tablet 0.5 mg     melatonin tablet 1 mg     multivitamin w/minerals (THERA-VIT-M) tablet 1 tablet     naloxone (NARCAN) injection 0.1-0.4 mg     ondansetron (ZOFRAN-ODT) ODT tab 4 mg    Or     ondansetron (ZOFRAN) injection 4 mg     pantoprazole (PROTONIX) EC tablet 40 mg     piperacillin-tazobactam (ZOSYN) 3.375 g vial to attach to  mL bag     potassium chloride ER (KLOR-CON M) CR tablet 20 mEq     prochlorperazine (COMPAZINE)  injection 10 mg    Or     prochlorperazine (COMPAZINE) tablet 10 mg    Or     prochlorperazine (COMPAZINE) Suppository 25 mg     simethicone (MYLICON) chewable tablet 80 mg     sodium chloride (PF) 0.9% PF flush 3 mL     sodium chloride (PF) 0.9% PF flush 3 mL     Facility-Administered Medications Ordered in Other Encounters   Medication     heparin 100 UNIT/ML injection 5 mL     sodium chloride (PF) 0.9% PF flush 10 mL

## 2020-07-22 NOTE — PROGRESS NOTES
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  CT scan of the abdomen and pelvis obtained revealed thickening and enhancement of the bile duct proximal to the choledochojejunostomy with surrounding inflammation concerning for cholangitis.  Her liver tests are mildly abnormal including alkaline phosphatase that is increased from her baseline.  Her white blood cell count was 25,000 on admission but did just receive Neulasta 4 days prior to admission. This has normalized. Her liver tests are stable and bilirubin remains normal.    At this point it is unclear if her symptoms are related to cholangitis despite CT findings.  She does not have any biliary dilation. She is improving with conservative management (antibiotics). Again no indication for urgent ERCP at this time.     Recommendations:  Continue conservative management  Continue antibiotics  No urgent ERCP indicated  OK for general diet  Analgesia/Antiemetics per primary team  Discussed with primary team Dr. Mendiola    The patient was discussed and plan agreed upon with GI staff, Dr Aviles.      Mirta Neri PA-C  Advanced Endoscopy/Pancreaticobiliary GI Service  [facility]  Pager *[phone]  Text Page  _______________________________________________________________      Subjective: Nursing notes and 24hr events reviewed. Patient seen and examined at 1145. Patient reports that she is feeling better today. RUQ improved and less tender on exam. Has been tolerating a general diet.    ROS:   4 pt ROS negative unless noted in subjective.     Objective:  Blood pressure 106/61, pulse 86,  "temperature 97.2  F (36.2  C), temperature source Axillary, resp. rate 16, height 1.727 m (5' 8\"), weight 76.2 kg (167 lb 14.4 oz), last menstrual period 09/23/2014, SpO2 99 %, not currently breastfeeding.  Gen: Sitting comfortably in bed. NAD  HEENT: NCAT. Conjunctiva clear. Sclera anicteric  Resp: normal work of breathing  Msk: no gross deformity  Skin:  no jaundice  Ext: warm, well perfused  Neuro: grossly normal  Mental status/Psych: A&O. Asks/answers questions appropriately       Date 07/22/20 0700 - 07/23/20 0659   Shift 4476-1230 8770-9886 5187-1863 24 Hour Total   INTAKE   P.O. 240   240   Shift Total(mL/kg) 240(3.15)   240(3.15)   OUTPUT   Urine 825   825   Shift Total(mL/kg) 825(10.83)   825(10.83)   Weight (kg) 76.16 76.16 76.16 76.16     LABS:  BMP  Recent Labs   Lab 07/21/20  0329 07/20/20  2304 07/20/20  1509     --  139   POTASSIUM 3.4  --  3.4   CHLORIDE 109  --  109   PETRA 7.5*  --  7.7*   CO2 27  --  26   BUN 7  --  7   CR 0.74 0.74 0.71   GLC 96  --  99     CBC  Recent Labs   Lab 07/21/20  1309 07/21/20  0329 07/20/20  2304 07/20/20  1509   WBC 8.4 13.4*  --  25.4*   RBC 2.79* 2.87*  --  3.26*   HGB 7.5* 7.8*  --  8.8*   HCT 24.4* 25.0*  --  28.8*   MCV 88 87  --  88   MCH 26.9 27.2  --  27.0   MCHC 30.7* 31.2*  --  30.6*   RDW 22.7* 22.8*  --  22.9*   PLT 72* 67* 87* 102*     INR  Recent Labs   Lab 07/20/20  1509   INR 1.20*     LFTs  Recent Labs   Lab 07/22/20  0903 07/21/20  0329 07/20/20  1509   ALKPHOS 235* 216* 267*   AST 58* 49* 60*   ALT 42 39 45   BILITOTAL 0.5 0.5 0.6   PROTTOTAL 5.2* 4.6* 5.2*   ALBUMIN 2.0* 1.8* 2.2*      PANC  Recent Labs   Lab 07/20/20  1509   LIPASE 31*         IMAGING:  reviewed    "

## 2020-07-22 NOTE — PROGRESS NOTES
"[facility] Progress Note - Hospitalist Service, Gold 11       Date of Admission: 7/20/2020    Brief Hospital Course  This is a 54-year-old female with a past medical history of gastric adenocarcinoma status post chemotherapy, patient has additionally had complications associated with pancreatic cyst requiring Whipple procedure in early 2020.  Patient presented secondary to epigastric pain.  CT imaging was concerning for acute cholangitis.  Associated symptoms included nausea and vomiting.  Patient was started on empiric coverage antibiotics with Flagyl and Levaquin and transition to Flagyl cefepime.     UA revealed UTI without clinical picture.     Noted recent administration of  GCSF on 7/16    Assessment   Principal Problem:    Cholangitis  Active Problems:    MDD (major depressive disorder), recurrent, in partial remission (H)    Low urine output    Leukocytosis    Intraabdominal fluid collection    Gastric adenocarcinoma (H)    Thrombocytopenia (H)    Urinary tract infection      Plan     Continue IV Abx  Advance diet  If tolerating diet will transition to Levaquin flagyl tomorrow  No evidence of  Consumptive coagulopathy     Subjective   Chief complaint: \"Abdominal Pain\"    Pain is minimally improved   No fever or chills  No CP or SOB  No Diarrhea  N/V improved    Review of Systems  10 point ROS performed and is negative except as mention above     Physical Exam  Vital signs:  Temp: 97.2  F (36.2  C) Temp src: Axillary BP: 106/61 Pulse: 86 Heart Rate: 95 Resp: 16 SpO2: 99 % O2 Device: None (Room air)   Height: 172.7 cm (5' 8\") Weight: 76.2 kg (167 lb 14.4 oz)  Estimated body mass index is 25.53 kg/m  as calculated from the following:    Height as of this encounter: 1.727 m (5' 8\").    Weight as of this encounter: 76.2 kg (167 lb 14.4 oz).        Physical Exam  Constitutional:       Appearance: Normal appearance.   HENT:      Head: Normocephalic and atraumatic. "      Mouth/Throat:      Mouth: Mucous membranes are moist.   Cardiovascular:      Rate and Rhythm: Normal rate and regular rhythm.      Heart sounds: No murmur. No friction rub. No gallop.    Abdominal:      General: Abdomen is flat. Bowel sounds are normal. There is no distension.      Palpations: There is no mass.      Tenderness: There is abdominal tenderness. There is guarding.      Comments: Tenderness and guarding has improved but is still present   Skin:     Coloration: Skin is not jaundiced.   Neurological:      General: No focal deficit present.      Mental Status: She is alert and oriented to person, place, and time.      Cranial Nerves: No cranial nerve deficit.      Sensory: No sensory deficit.      Motor: No weakness.   Psychiatric:         Mood and Affect: Mood normal.         Behavior: Behavior normal.           Labs  Lab Results   Component Value Date    WBC 8.4 07/21/2020    HGB 7.5 (L) 07/21/2020    HCT 24.4 (L) 07/21/2020    PLT 72 (L) 07/21/2020     07/21/2020    POTASSIUM 3.4 07/21/2020    CHLORIDE 109 07/21/2020    CO2 27 07/21/2020    BUN 7 07/21/2020    CR 0.74 07/21/2020    GLC 96 07/21/2020    SED 27 07/20/2020    DD 1.2 (H) 07/22/2020    NTBNPI 412 01/30/2020    NTBNP 184 (H) 01/17/2020    TROPI <0.015 05/21/2020    AST 58 (H) 07/22/2020    ALT 42 07/22/2020    ALKPHOS 235 (H) 07/22/2020    BILITOTAL 0.5 07/22/2020    INR 1.20 (H) 07/20/2020         Imaging   Results for orders placed or performed during the hospital encounter of 07/20/20   CT Abdomen Pelvis w Contrast    Impression    IMPRESSION:   1. Thickening and enhancement of the bile duct proximal to the  choledochojejunostomy with surrounding inflammatory change, slightly  increased, as can be seen with cholangitis.  2. Fluid with mild adjacent peritoneal thickening and enhancement in  the pelvis posterior to the uterus. This could represent small volume  ascites or a subtle sign of peritonitis.  3. Subtle thickening and  enhancement of portions of the jejunum,  sigmoid colon, and proximal transverse colon, this may be secondary to  lack of full distention however or mild enterocolitis.  2. Stable soft tissue nodularity along the right abdominal wall and  right inguinal region, this is favored to be secondary to fat necrosis  from prior fluid collections presence but lack of activity on PET/CT.  3. Decreased main pancreatic ductal dilation.  4. Subtle hazy appearance of the mesentery, this is presumed to be  reactive in nature.  5. Mild left lower lobe groundglass opacity, nonspecific, infection  versus atelectasis.    I have personally reviewed the examination and initial interpretation  and I agree with the findings.    MD Julien ORTEGA MD on 7/21/2020 at 12:41 PM    Time Spent: 25+ minutes

## 2020-07-22 NOTE — PLAN OF CARE
Patient continues to report abdominal pain. Medicated with Dilaudid 2 mg oral twice overnight, with some relief. Able to sleep intermittently. Up to bathroom independently. Voiding anand urine. IV fluids at TKO. Afebrile. Vitals stable Sats ok on room air. Antibiotic coverage with Zosyn. Labs collected this morning; coags. Follow up with team if additional labs are wanted. Single lumen kd-cath.

## 2020-07-23 LAB
ACANTHOCYTES BLD QL SMEAR: SLIGHT
ALBUMIN SERPL-MCNC: 2 G/DL (ref 3.4–5)
ALP SERPL-CCNC: 229 U/L (ref 40–150)
ALT SERPL W P-5'-P-CCNC: 38 U/L (ref 0–50)
ANION GAP SERPL CALCULATED.3IONS-SCNC: 4 MMOL/L (ref 3–14)
ANISOCYTOSIS BLD QL SMEAR: SLIGHT
AST SERPL W P-5'-P-CCNC: 57 U/L (ref 0–45)
BASOPHILS # BLD AUTO: 0.1 10E9/L (ref 0–0.2)
BASOPHILS NFR BLD AUTO: 0.9 %
BILIRUB DIRECT SERPL-MCNC: 0.2 MG/DL (ref 0–0.2)
BILIRUB SERPL-MCNC: 1.7 MG/DL (ref 0.2–1.3)
BUN SERPL-MCNC: 3 MG/DL (ref 7–30)
CALCIUM SERPL-MCNC: 7.9 MG/DL (ref 8.5–10.1)
CHLORIDE SERPL-SCNC: 105 MMOL/L (ref 94–109)
CO2 SERPL-SCNC: 29 MMOL/L (ref 20–32)
CREAT SERPL-MCNC: 0.76 MG/DL (ref 0.52–1.04)
CREAT SERPL-MCNC: 0.81 MG/DL (ref 0.52–1.04)
DIFFERENTIAL METHOD BLD: ABNORMAL
EOSINOPHIL # BLD AUTO: 0.1 10E9/L (ref 0–0.7)
EOSINOPHIL NFR BLD AUTO: 0.9 %
ERYTHROCYTE [DISTWIDTH] IN BLOOD BY AUTOMATED COUNT: 23.1 % (ref 10–15)
GFR SERPL CREATININE-BSD FRML MDRD: 82 ML/MIN/{1.73_M2}
GFR SERPL CREATININE-BSD FRML MDRD: 88 ML/MIN/{1.73_M2}
GLUCOSE SERPL-MCNC: 95 MG/DL (ref 70–99)
HCT VFR BLD AUTO: 28.1 % (ref 35–47)
HGB BLD-MCNC: 8.6 G/DL (ref 11.7–15.7)
LYMPHOCYTES # BLD AUTO: 2.5 10E9/L (ref 0.8–5.3)
LYMPHOCYTES NFR BLD AUTO: 29.6 %
MCH RBC QN AUTO: 26.9 PG (ref 26.5–33)
MCHC RBC AUTO-ENTMCNC: 30.6 G/DL (ref 31.5–36.5)
MCV RBC AUTO: 88 FL (ref 78–100)
METAMYELOCYTES # BLD: 0.1 10E9/L
METAMYELOCYTES NFR BLD MANUAL: 0.9 %
MONOCYTES # BLD AUTO: 0.9 10E9/L (ref 0–1.3)
MONOCYTES NFR BLD AUTO: 10.4 %
NEUTROPHILS # BLD AUTO: 4.8 10E9/L (ref 1.6–8.3)
NEUTROPHILS NFR BLD AUTO: 57.3 %
PLATELET # BLD AUTO: 110 10E9/L (ref 150–450)
PLATELET # BLD AUTO: 98 10E9/L (ref 150–450)
PLATELET # BLD EST: ABNORMAL 10*3/UL
POIKILOCYTOSIS BLD QL SMEAR: SLIGHT
POLYCHROMASIA BLD QL SMEAR: SLIGHT
POTASSIUM SERPL-SCNC: 3.4 MMOL/L (ref 3.4–5.3)
PROT SERPL-MCNC: 5 G/DL (ref 6.8–8.8)
RBC # BLD AUTO: 3.2 10E12/L (ref 3.8–5.2)
SODIUM SERPL-SCNC: 139 MMOL/L (ref 133–144)
WBC # BLD AUTO: 8.3 10E9/L (ref 4–11)

## 2020-07-23 PROCEDURE — 82565 ASSAY OF CREATININE: CPT | Performed by: PHYSICIAN ASSISTANT

## 2020-07-23 PROCEDURE — 25000132 ZZH RX MED GY IP 250 OP 250 PS 637: Mod: GY | Performed by: PHYSICIAN ASSISTANT

## 2020-07-23 PROCEDURE — 25000128 H RX IP 250 OP 636: Performed by: INTERNAL MEDICINE

## 2020-07-23 PROCEDURE — 99232 SBSQ HOSP IP/OBS MODERATE 35: CPT | Performed by: INTERNAL MEDICINE

## 2020-07-23 PROCEDURE — 80048 BASIC METABOLIC PNL TOTAL CA: CPT | Performed by: INTERNAL MEDICINE

## 2020-07-23 PROCEDURE — 85049 AUTOMATED PLATELET COUNT: CPT | Performed by: PHYSICIAN ASSISTANT

## 2020-07-23 PROCEDURE — 25000132 ZZH RX MED GY IP 250 OP 250 PS 637: Mod: GY | Performed by: HOSPITALIST

## 2020-07-23 PROCEDURE — 25000132 ZZH RX MED GY IP 250 OP 250 PS 637: Mod: GY | Performed by: INTERNAL MEDICINE

## 2020-07-23 PROCEDURE — 25000128 H RX IP 250 OP 636: Performed by: PHYSICIAN ASSISTANT

## 2020-07-23 PROCEDURE — 80076 HEPATIC FUNCTION PANEL: CPT | Performed by: INTERNAL MEDICINE

## 2020-07-23 PROCEDURE — 85025 COMPLETE CBC W/AUTO DIFF WBC: CPT | Performed by: INTERNAL MEDICINE

## 2020-07-23 PROCEDURE — 80076 HEPATIC FUNCTION PANEL: CPT | Performed by: PHYSICIAN ASSISTANT

## 2020-07-23 PROCEDURE — 12000012 ZZH R&B MS OVERFLOW UMMC

## 2020-07-23 RX ORDER — METRONIDAZOLE 500 MG/1
500 TABLET ORAL 3 TIMES DAILY
Status: DISCONTINUED | OUTPATIENT
Start: 2020-07-23 | End: 2020-07-24

## 2020-07-23 RX ORDER — LEVOFLOXACIN 750 MG/1
750 TABLET, FILM COATED ORAL EVERY 24 HOURS
Status: DISCONTINUED | OUTPATIENT
Start: 2020-07-23 | End: 2020-07-24

## 2020-07-23 RX ORDER — HEPARIN SODIUM,PORCINE 10 UNIT/ML
5-10 VIAL (ML) INTRAVENOUS EVERY 24 HOURS
Status: DISCONTINUED | OUTPATIENT
Start: 2020-07-23 | End: 2020-07-29 | Stop reason: HOSPADM

## 2020-07-23 RX ORDER — HEPARIN SODIUM,PORCINE 10 UNIT/ML
5-10 VIAL (ML) INTRAVENOUS
Status: DISCONTINUED | OUTPATIENT
Start: 2020-07-23 | End: 2020-07-29 | Stop reason: HOSPADM

## 2020-07-23 RX ORDER — LIDOCAINE 40 MG/G
CREAM TOPICAL
Status: DISCONTINUED | OUTPATIENT
Start: 2020-07-23 | End: 2020-07-29 | Stop reason: HOSPADM

## 2020-07-23 RX ORDER — HEPARIN SODIUM (PORCINE) LOCK FLUSH IV SOLN 100 UNIT/ML 100 UNIT/ML
5 SOLUTION INTRAVENOUS
Status: DISCONTINUED | OUTPATIENT
Start: 2020-07-23 | End: 2020-07-29 | Stop reason: HOSPADM

## 2020-07-23 RX ADMIN — METRONIDAZOLE 500 MG: 500 TABLET ORAL at 14:54

## 2020-07-23 RX ADMIN — HYDROMORPHONE HYDROCHLORIDE 2 MG: 2 TABLET ORAL at 15:27

## 2020-07-23 RX ADMIN — LEVOFLOXACIN 750 MG: 750 TABLET, FILM COATED ORAL at 10:24

## 2020-07-23 RX ADMIN — FAMOTIDINE 20 MG: 20 TABLET ORAL at 07:59

## 2020-07-23 RX ADMIN — AMITRIPTYLINE HYDROCHLORIDE 75 MG: 75 TABLET, FILM COATED ORAL at 21:13

## 2020-07-23 RX ADMIN — HYDROMORPHONE HYDROCHLORIDE 2 MG: 2 TABLET ORAL at 07:58

## 2020-07-23 RX ADMIN — CITALOPRAM HYDROBROMIDE 20 MG: 20 TABLET, FILM COATED ORAL at 07:59

## 2020-07-23 RX ADMIN — HYDROMORPHONE HYDROCHLORIDE 2 MG: 2 TABLET ORAL at 00:06

## 2020-07-23 RX ADMIN — Medication 5 ML: at 17:59

## 2020-07-23 RX ADMIN — SIMETHICONE 80 MG: 80 TABLET, CHEWABLE ORAL at 02:17

## 2020-07-23 RX ADMIN — MULTIPLE VITAMINS W/ MINERALS TAB 1 TABLET: TAB at 07:59

## 2020-07-23 RX ADMIN — METRONIDAZOLE 500 MG: 500 TABLET ORAL at 09:11

## 2020-07-23 RX ADMIN — HYDROMORPHONE HYDROCHLORIDE 2 MG: 2 TABLET ORAL at 04:59

## 2020-07-23 RX ADMIN — PANTOPRAZOLE SODIUM 40 MG: 40 TABLET, DELAYED RELEASE ORAL at 19:53

## 2020-07-23 RX ADMIN — HYDROMORPHONE HYDROCHLORIDE 2 MG: 2 TABLET ORAL at 11:32

## 2020-07-23 RX ADMIN — ONDANSETRON 4 MG: 4 TABLET, ORALLY DISINTEGRATING ORAL at 19:53

## 2020-07-23 RX ADMIN — HYDROMORPHONE HYDROCHLORIDE 2 MG: 2 TABLET ORAL at 18:51

## 2020-07-23 RX ADMIN — PIPERACILLIN AND TAZOBACTAM 3.38 G: 3; .375 INJECTION, POWDER, FOR SOLUTION INTRAVENOUS at 07:58

## 2020-07-23 RX ADMIN — METRONIDAZOLE 500 MG: 500 TABLET ORAL at 19:53

## 2020-07-23 RX ADMIN — PROCHLORPERAZINE MALEATE 10 MG: 5 TABLET ORAL at 15:27

## 2020-07-23 RX ADMIN — SODIUM CHLORIDE, PRESERVATIVE FREE 5 ML: 5 INJECTION INTRAVENOUS at 15:28

## 2020-07-23 RX ADMIN — SIMETHICONE 80 MG: 80 TABLET, CHEWABLE ORAL at 21:13

## 2020-07-23 RX ADMIN — HYDROMORPHONE HYDROCHLORIDE 1 MG: 1 INJECTION, SOLUTION INTRAMUSCULAR; INTRAVENOUS; SUBCUTANEOUS at 14:42

## 2020-07-23 RX ADMIN — ONDANSETRON 4 MG: 4 TABLET, ORALLY DISINTEGRATING ORAL at 13:07

## 2020-07-23 RX ADMIN — PANTOPRAZOLE SODIUM 40 MG: 40 TABLET, DELAYED RELEASE ORAL at 07:59

## 2020-07-23 RX ADMIN — SIMETHICONE 80 MG: 80 TABLET, CHEWABLE ORAL at 07:59

## 2020-07-23 RX ADMIN — HYDROMORPHONE HYDROCHLORIDE 1 MG: 1 INJECTION, SOLUTION INTRAMUSCULAR; INTRAVENOUS; SUBCUTANEOUS at 17:59

## 2020-07-23 RX ADMIN — FAMOTIDINE 20 MG: 20 TABLET ORAL at 19:53

## 2020-07-23 RX ADMIN — SIMETHICONE 80 MG: 80 TABLET, CHEWABLE ORAL at 14:54

## 2020-07-23 RX ADMIN — POTASSIUM CHLORIDE 20 MEQ: 750 TABLET, EXTENDED RELEASE ORAL at 07:59

## 2020-07-23 RX ADMIN — ONDANSETRON 4 MG: 4 TABLET, ORALLY DISINTEGRATING ORAL at 04:59

## 2020-07-23 RX ADMIN — HYDROMORPHONE HYDROCHLORIDE 2 MG: 2 TABLET ORAL at 21:51

## 2020-07-23 RX ADMIN — PIPERACILLIN AND TAZOBACTAM 3.38 G: 3; .375 INJECTION, POWDER, FOR SOLUTION INTRAVENOUS at 02:28

## 2020-07-23 RX ADMIN — HYDROMORPHONE HYDROCHLORIDE 1 MG: 1 INJECTION, SOLUTION INTRAMUSCULAR; INTRAVENOUS; SUBCUTANEOUS at 23:19

## 2020-07-23 ASSESSMENT — PAIN DESCRIPTION - DESCRIPTORS
DESCRIPTORS: ACHING;SHOOTING;TIGHTNESS
DESCRIPTORS: ACHING;SHOOTING;TIGHTNESS
DESCRIPTORS: ACHING;DISCOMFORT

## 2020-07-23 ASSESSMENT — ACTIVITIES OF DAILY LIVING (ADL)
ADLS_ACUITY_SCORE: 12

## 2020-07-23 ASSESSMENT — MIFFLIN-ST. JEOR: SCORE: 1411.46

## 2020-07-23 NOTE — PROGRESS NOTES
"[facility], [facility] Progress Note - Hospitalist Service, Gold 11       Date of Admission: 7/20/2020    Brief Hospital Course  This is a 54-year-old female with a past medical history of gastric adenocarcinoma status post chemotherapy, patient has additionally had complications associated with pancreatic cyst requiring Whipple procedure in early 2020.  Patient presented secondary to epigastric pain.  CT imaging was concerning for acute cholangitis.  Associated symptoms included nausea and vomiting.  Patient was started on empiric coverage antibiotics with Flagyl and Levaquin and transition to Flagyl cefepime.     UA revealed UTI without clinical picture.     Noted recent administration of  GCSF on 7/16    Assessment   Principal Problem:    Cholangitis  Active Problems:    MDD (major depressive disorder), recurrent, in partial remission (H)    Low urine output    Leukocytosis    Intraabdominal fluid collection    Gastric adenocarcinoma (H)    Thrombocytopenia (H)    Urinary tract infection      Plan     Transition to oral abx (levaquin and flagyl)  Tolerating diet. Transition to oral pain meds  PT/OT  Discharge pain in next 24 hours    Subjective   Chief complaint: \"Abdominal Pain improved\"    Pain improved   No fever or chills  No CP or SOB  No Diarrhea  N/V improved    Review of Systems  10 point ROS performed and is negative except as mention above     Physical Exam  Vital signs:  Temp: 98.7  F (37.1  C) Temp src: Temporal BP: 103/65 Pulse: 93 Heart Rate: 109 Resp: 16 SpO2: 97 % O2 Device: None (Room air)   Height: 172.7 cm (5' 8\") Weight: 75.8 kg (167 lb 1.6 oz)  Estimated body mass index is 25.41 kg/m  as calculated from the following:    Height as of this encounter: 1.727 m (5' 8\").    Weight as of this encounter: 75.8 kg (167 lb 1.6 oz).        Physical Exam  Constitutional:       Appearance: Normal appearance.   HENT:      Head: Normocephalic and atraumatic.      " Mouth/Throat:      Mouth: Mucous membranes are moist.   Cardiovascular:      Rate and Rhythm: Normal rate and regular rhythm.      Heart sounds: No murmur. No friction rub. No gallop.    Abdominal:      General: Abdomen is flat. Bowel sounds are normal. There is no distension.      Palpations: There is no mass.      Tenderness: There is abdominal tenderness. There is guarding.      Comments: Tenderness and guarding has improved but is still present   Skin:     Coloration: Skin is not jaundiced.   Neurological:      General: No focal deficit present.      Mental Status: She is alert and oriented to person, place, and time.      Cranial Nerves: No cranial nerve deficit.      Sensory: No sensory deficit.      Motor: No weakness.   Psychiatric:         Mood and Affect: Mood normal.         Behavior: Behavior normal.           Labs  Lab Results   Component Value Date    WBC 8.3 07/23/2020    HGB 8.6 (L) 07/23/2020    HCT 28.1 (L) 07/23/2020     (L) 07/23/2020     07/23/2020    POTASSIUM 3.4 07/23/2020    CHLORIDE 105 07/23/2020    CO2 29 07/23/2020    BUN 3 (L) 07/23/2020    CR 0.76 07/23/2020    GLC 95 07/23/2020    SED 27 07/20/2020    DD 1.2 (H) 07/22/2020    NTBNPI 412 01/30/2020    NTBNP 184 (H) 01/17/2020    TROPI <0.015 05/21/2020    AST 57 (H) 07/23/2020    ALT 38 07/23/2020    ALKPHOS 229 (H) 07/23/2020    BILITOTAL 1.7 (H) 07/23/2020    INR 1.20 (H) 07/20/2020         Imaging   Results for orders placed or performed during the hospital encounter of 07/20/20   CT Abdomen Pelvis w Contrast    Impression    IMPRESSION:   1. Thickening and enhancement of the bile duct proximal to the  choledochojejunostomy with surrounding inflammatory change, slightly  increased, as can be seen with cholangitis.  2. Fluid with mild adjacent peritoneal thickening and enhancement in  the pelvis posterior to the uterus. This could represent small volume  ascites or a subtle sign of peritonitis.  3. Subtle thickening and  enhancement of portions of the jejunum,  sigmoid colon, and proximal transverse colon, this may be secondary to  lack of full distention however or mild enterocolitis.  2. Stable soft tissue nodularity along the right abdominal wall and  right inguinal region, this is favored to be secondary to fat necrosis  from prior fluid collections presence but lack of activity on PET/CT.  3. Decreased main pancreatic ductal dilation.  4. Subtle hazy appearance of the mesentery, this is presumed to be  reactive in nature.  5. Mild left lower lobe groundglass opacity, nonspecific, infection  versus atelectasis.    I have personally reviewed the examination and initial interpretation  and I agree with the findings.    MD Julien ORTEGA MD on 7/21/2020 at 12:41 PM    Time Spent: 25+ minutes

## 2020-07-23 NOTE — PLAN OF CARE
Neuro: A&Ox4.   Cardiac: VSS.   Respiratory: Sating 100% on RA.  GI/: voiding-  BM X1  Diet/appetite: Regular diet. Abdominal pain increased after eating. NPO at MN-   Activity:  Independent in room. Showered.   Pain: PO and IV dilaudid given.   Skin: No new deficits noted. Pale  LDA's: PORT hep locked    Plan: Continue with POC. Notify primary team with changes. Transitioned to oral antibiotics today in hope of discharge tomorrow but Abd pain increased- plan for ERCP tomorrow.

## 2020-07-23 NOTE — PROGRESS NOTES
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  CT scan of the abdomen and pelvis obtained revealed thickening and enhancement of the bile duct proximal to the choledochojejunostomy with surrounding inflammation concerning for cholangitis.  Her liver tests are mildly abnormal including alkaline phosphatase that is increased from her baseline.  Her white blood cell count was 25,000 on admission but did just receive Neulasta 4 days prior to admission. This has normalized.     At this point it is unclear if her symptoms are related to cholangitis despite CT findings.  She does not have any biliary dilation. She is improving with conservative management (antibiotics and analgesia). Her total bilirubin has increased slightly today to 1.7 (previously normal) but clinically is improving. Will recheck labs tomorrow and make decision if ERCP should be completed prior to discharge.     Recommendations:  Continue antibiotics  Diet today, NPO at midnight (delay breakfast) until after morning labs result  Follow hepatic panel  Analgesia/Antiemetics per primary team  Discussed with primary team Dr. Mendiola    The patient was discussed and plan agreed upon with GI staff, Dr Aviles.      Mirta Neri PA-C  Advanced Endoscopy/Pancreaticobiliary GI Service  [facility]  Pager *[phone]  Text Page  _______________________________________________________________      Subjective: Nursing notes and 24hr events reviewed. Patient seen and examined at 1230. Reports pain again is improved today. Had increased pain and nausea yesterday after eating littlejohn - resolved  "after taking simethicone and zofran. No fevers or vomiting.     ROS:   4 pt ROS negative unless noted in subjective.     Objective:  Blood pressure 103/65, pulse 93, temperature 98.7  F (37.1  C), temperature source Temporal, resp. rate 16, height 1.727 m (5' 8\"), weight 75.8 kg (167 lb 1.6 oz), last menstrual period 09/23/2014, SpO2 97 %, not currently breastfeeding.  Gen: Sitting comfortably in bed. NAD  HEENT: NCAT. Conjunctiva clear. Sclera anicteric  Resp: normal work of breathing  Abd: tender RUQ, ND, +BS  Msk: no gross deformity  Skin:  no jaundice  Ext: warm, well perfused  Neuro: grossly normal  Mental status/Psych: A&O. Asks/answers questions appropriately       Date 07/22/20 0700 - 07/23/20 0659   Shift 6793-2297 0441-6070 9815-7492 24 Hour Total   INTAKE   P.O. 240   240   Shift Total(mL/kg) 240(3.15)   240(3.15)   OUTPUT   Urine 825   825   Shift Total(mL/kg) 825(10.83)   825(10.83)   Weight (kg) 76.16 76.16 76.16 76.16     LABS:  BMP  Recent Labs   Lab 07/23/20  0925 07/23/20  0001 07/21/20  0329 07/20/20  2304 07/20/20  1509     --  139  --  139   POTASSIUM 3.4  --  3.4  --  3.4   CHLORIDE 105  --  109  --  109   PETRA 7.9*  --  7.5*  --  7.7*   CO2 29  --  27  --  26   BUN 3*  --  7  --  7   CR 0.76 0.81 0.74 0.74 0.71   GLC 95  --  96  --  99     CBC  Recent Labs   Lab 07/23/20  0925 07/23/20  0525 07/21/20  1309 07/21/20  0329  07/20/20  1509   WBC 8.3  --  8.4 13.4*  --  25.4*   RBC 3.20*  --  2.79* 2.87*  --  3.26*   HGB 8.6*  --  7.5* 7.8*  --  8.8*   HCT 28.1*  --  24.4* 25.0*  --  28.8*   MCV 88  --  88 87  --  88   MCH 26.9  --  26.9 27.2  --  27.0   MCHC 30.6*  --  30.7* 31.2*  --  30.6*   RDW 23.1*  --  22.7* 22.8*  --  22.9*   * 98* 72* 67*   < > 102*    < > = values in this interval not displayed.     INR  Recent Labs   Lab 07/20/20  1509   INR 1.20*     LFTs  Recent Labs   Lab 07/23/20  0925 07/22/20  0903 07/21/20  0329 07/20/20  1509   ALKPHOS 229* 235* 216* 267*   AST 57* " 58* 49* 60*   ALT 38 42 39 45   BILITOTAL 1.7* 0.5 0.5 0.6   PROTTOTAL 5.0* 5.2* 4.6* 5.2*   ALBUMIN 2.0* 2.0* 1.8* 2.2*      PANC  Recent Labs   Lab 07/20/20  1509   LIPASE 31*         IMAGING:  reviewed

## 2020-07-23 NOTE — PLAN OF CARE
Afebrile. Soft BPs. OVSS. Continues to have abdominal pain, PO dilaudid x3 and IV dilaudid x1. Intermittently nauseated, compazine x1 and zofran x1. Simethicone x2 for gas pain. Voiding adequately. Up independently. Continue plan of care.         Problem: Adult Inpatient Plan of Care  Goal: Plan of Care Review  7/23/2020 0534 by Kalpana Tapia RN  Outcome: No Change  Flowsheets (Taken 7/23/2020 0534)  Plan of Care Reviewed With: patient  Progress: no change     Problem: Adult Inpatient Plan of Care  Goal: Patient-Specific Goal (Individualization)  7/23/2020 0534 by Kalpana Tapia RN  Outcome: No Change     Problem: Adult Inpatient Plan of Care  Goal: Absence of Hospital-Acquired Illness or Injury  7/23/2020 0534 by Kalpana Tapia RN  Outcome: No Change     Problem: Adult Inpatient Plan of Care  Goal: Optimal Comfort and Wellbeing  7/23/2020 0534 by Kalpana Tapia RN  Outcome: No Change     Problem: Adult Inpatient Plan of Care  Goal: Readiness for Transition of Care  7/23/2020 0534 by Kalpana Tapia RN  Outcome: No Change     Problem: Adult Inpatient Plan of Care  Goal: Rounds/Family Conference  7/23/2020 0534 by Kalpana Tapia RN  Outcome: No Change     Problem: Cardiac Disease Comorbidity  Goal: Cardiac Disease  Description: Patient comorbidity will be monitored for signs and symptoms of Cardiac Disease.  Problems will be absent, minimized or managed by discharge/transition of care.  7/23/2020 0534 by Kalpana Tapia RN  Outcome: No Change     Problem: Gastrointestinal Condition Comorbidity  Goal: Gastrointestinal Condition  Description: Patient comorbidity will be monitored for signs and symptoms of Gastrointestinal condition.  Problems will be absent, minimized or managed by discharge/transition of care.  7/23/2020 0534 by Kalpana Tapia RN  Outcome: No Change     Problem: Pain Chronic (Persistent) (Comorbidity Management)  Goal: Acceptable Pain Control and Functional Ability  7/23/2020 0534 by  Kalpana Tapia, RN  Outcome: No Change     Problem: Pain Acute  Goal: Optimal Pain Control  7/23/2020 0534 by Kalpana Tapia, RN  Outcome: No Change

## 2020-07-24 LAB
ALBUMIN SERPL-MCNC: 1.9 G/DL (ref 3.4–5)
ALP SERPL-CCNC: 219 U/L (ref 40–150)
ALT SERPL W P-5'-P-CCNC: 34 U/L (ref 0–50)
ANION GAP SERPL CALCULATED.3IONS-SCNC: 4 MMOL/L (ref 3–14)
ANISOCYTOSIS BLD QL SMEAR: ABNORMAL
AST SERPL W P-5'-P-CCNC: 50 U/L (ref 0–45)
BASOPHILS # BLD AUTO: 0.2 10E9/L (ref 0–0.2)
BASOPHILS NFR BLD AUTO: 1.7 %
BILIRUB DIRECT SERPL-MCNC: 0.1 MG/DL (ref 0–0.2)
BILIRUB SERPL-MCNC: 0.6 MG/DL (ref 0.2–1.3)
BUN SERPL-MCNC: 3 MG/DL (ref 7–30)
CALCIUM SERPL-MCNC: 8 MG/DL (ref 8.5–10.1)
CHLORIDE SERPL-SCNC: 105 MMOL/L (ref 94–109)
CO2 SERPL-SCNC: 29 MMOL/L (ref 20–32)
CREAT SERPL-MCNC: 0.79 MG/DL (ref 0.52–1.04)
DIFFERENTIAL METHOD BLD: ABNORMAL
EOSINOPHIL # BLD AUTO: 0.1 10E9/L (ref 0–0.7)
EOSINOPHIL NFR BLD AUTO: 0.9 %
ERYTHROCYTE [DISTWIDTH] IN BLOOD BY AUTOMATED COUNT: 23.3 % (ref 10–15)
FIBRINOGEN AG PPP IA-MCNC: 268 MG/DL (ref 149–353)
GFR SERPL CREATININE-BSD FRML MDRD: 85 ML/MIN/{1.73_M2}
GLUCOSE SERPL-MCNC: 108 MG/DL (ref 70–99)
HCT VFR BLD AUTO: 28.9 % (ref 35–47)
HGB BLD-MCNC: 8.7 G/DL (ref 11.7–15.7)
LACTATE BLD-SCNC: 1.3 MMOL/L (ref 0.7–2)
LYMPHOCYTES # BLD AUTO: 2.2 10E9/L (ref 0.8–5.3)
LYMPHOCYTES NFR BLD AUTO: 17.4 %
MCH RBC QN AUTO: 26.6 PG (ref 26.5–33)
MCHC RBC AUTO-ENTMCNC: 30.1 G/DL (ref 31.5–36.5)
MCV RBC AUTO: 88 FL (ref 78–100)
MONOCYTES # BLD AUTO: 1.4 10E9/L (ref 0–1.3)
MONOCYTES NFR BLD AUTO: 11.3 %
MYELOCYTES # BLD: 0.1 10E9/L
MYELOCYTES NFR BLD MANUAL: 0.9 %
NEUTROPHILS # BLD AUTO: 8.4 10E9/L (ref 1.6–8.3)
NEUTROPHILS NFR BLD AUTO: 66.1 %
NRBC # BLD AUTO: 0.2 10*3/UL
NRBC BLD AUTO-RTO: 2 /100
OVALOCYTES BLD QL SMEAR: SLIGHT
PLATELET # BLD AUTO: 160 10E9/L (ref 150–450)
PLATELET # BLD EST: ABNORMAL 10*3/UL
POIKILOCYTOSIS BLD QL SMEAR: SLIGHT
POTASSIUM SERPL-SCNC: 3.4 MMOL/L (ref 3.4–5.3)
PROMYELOCYTES # BLD MANUAL: 0.2 10E9/L
PROMYELOCYTES NFR BLD MANUAL: 1.7 %
PROT SERPL-MCNC: 4.8 G/DL (ref 6.8–8.8)
RBC # BLD AUTO: 3.27 10E12/L (ref 3.8–5.2)
SODIUM SERPL-SCNC: 137 MMOL/L (ref 133–144)
WBC # BLD AUTO: 12.7 10E9/L (ref 4–11)

## 2020-07-24 PROCEDURE — 25000128 H RX IP 250 OP 636: Performed by: PHYSICIAN ASSISTANT

## 2020-07-24 PROCEDURE — 83605 ASSAY OF LACTIC ACID: CPT | Performed by: INTERNAL MEDICINE

## 2020-07-24 PROCEDURE — 25000132 ZZH RX MED GY IP 250 OP 250 PS 637: Mod: GY | Performed by: INTERNAL MEDICINE

## 2020-07-24 PROCEDURE — 25000128 H RX IP 250 OP 636: Performed by: INTERNAL MEDICINE

## 2020-07-24 PROCEDURE — 80076 HEPATIC FUNCTION PANEL: CPT | Performed by: INTERNAL MEDICINE

## 2020-07-24 PROCEDURE — 25000132 ZZH RX MED GY IP 250 OP 250 PS 637: Mod: GY | Performed by: PHYSICIAN ASSISTANT

## 2020-07-24 PROCEDURE — 12000012 ZZH R&B MS OVERFLOW UMMC

## 2020-07-24 PROCEDURE — 85025 COMPLETE CBC W/AUTO DIFF WBC: CPT | Performed by: INTERNAL MEDICINE

## 2020-07-24 PROCEDURE — 25000132 ZZH RX MED GY IP 250 OP 250 PS 637: Mod: GY | Performed by: HOSPITALIST

## 2020-07-24 PROCEDURE — 40000893 ZZH STATISTIC PT IP EVAL DEFER

## 2020-07-24 PROCEDURE — 25800030 ZZH RX IP 258 OP 636: Performed by: INTERNAL MEDICINE

## 2020-07-24 PROCEDURE — 80048 BASIC METABOLIC PNL TOTAL CA: CPT | Performed by: INTERNAL MEDICINE

## 2020-07-24 PROCEDURE — 25800030 ZZH RX IP 258 OP 636: Performed by: PHYSICIAN ASSISTANT

## 2020-07-24 PROCEDURE — 99232 SBSQ HOSP IP/OBS MODERATE 35: CPT | Performed by: INTERNAL MEDICINE

## 2020-07-24 RX ORDER — SODIUM CHLORIDE 9 MG/ML
INJECTION, SOLUTION INTRAVENOUS CONTINUOUS
Status: DISCONTINUED | OUTPATIENT
Start: 2020-07-24 | End: 2020-07-29 | Stop reason: HOSPADM

## 2020-07-24 RX ORDER — PIPERACILLIN SODIUM, TAZOBACTAM SODIUM 3; .375 G/15ML; G/15ML
3.38 INJECTION, POWDER, LYOPHILIZED, FOR SOLUTION INTRAVENOUS EVERY 6 HOURS
Status: DISCONTINUED | OUTPATIENT
Start: 2020-07-24 | End: 2020-07-29

## 2020-07-24 RX ORDER — HYDROMORPHONE HYDROCHLORIDE 2 MG/1
4 TABLET ORAL
Status: DISCONTINUED | OUTPATIENT
Start: 2020-07-24 | End: 2020-07-24

## 2020-07-24 RX ORDER — HYDROMORPHONE HYDROCHLORIDE 2 MG/1
2-4 TABLET ORAL
Status: DISCONTINUED | OUTPATIENT
Start: 2020-07-24 | End: 2020-07-29 | Stop reason: HOSPADM

## 2020-07-24 RX ADMIN — FAMOTIDINE 20 MG: 20 TABLET ORAL at 21:13

## 2020-07-24 RX ADMIN — SIMETHICONE 80 MG: 80 TABLET, CHEWABLE ORAL at 03:31

## 2020-07-24 RX ADMIN — SODIUM CHLORIDE: 9 INJECTION, SOLUTION INTRAVENOUS at 10:28

## 2020-07-24 RX ADMIN — PIPERACILLIN AND TAZOBACTAM 3.38 G: 3; .375 INJECTION, POWDER, FOR SOLUTION INTRAVENOUS at 10:24

## 2020-07-24 RX ADMIN — PIPERACILLIN AND TAZOBACTAM 3.38 G: 3; .375 INJECTION, POWDER, FOR SOLUTION INTRAVENOUS at 21:13

## 2020-07-24 RX ADMIN — AMITRIPTYLINE HYDROCHLORIDE 75 MG: 75 TABLET, FILM COATED ORAL at 21:13

## 2020-07-24 RX ADMIN — POTASSIUM CHLORIDE 20 MEQ: 750 TABLET, EXTENDED RELEASE ORAL at 08:11

## 2020-07-24 RX ADMIN — SODIUM CHLORIDE: 9 INJECTION, SOLUTION INTRAVENOUS at 08:22

## 2020-07-24 RX ADMIN — SIMETHICONE 80 MG: 80 TABLET, CHEWABLE ORAL at 21:33

## 2020-07-24 RX ADMIN — ONDANSETRON 4 MG: 4 TABLET, ORALLY DISINTEGRATING ORAL at 09:29

## 2020-07-24 RX ADMIN — HYDROMORPHONE HYDROCHLORIDE 2 MG: 2 TABLET ORAL at 14:18

## 2020-07-24 RX ADMIN — HYDROMORPHONE HYDROCHLORIDE 4 MG: 2 TABLET ORAL at 16:26

## 2020-07-24 RX ADMIN — PANTOPRAZOLE SODIUM 40 MG: 40 TABLET, DELAYED RELEASE ORAL at 21:13

## 2020-07-24 RX ADMIN — HYDROMORPHONE HYDROCHLORIDE 1 MG: 1 INJECTION, SOLUTION INTRAMUSCULAR; INTRAVENOUS; SUBCUTANEOUS at 12:11

## 2020-07-24 RX ADMIN — SIMETHICONE 80 MG: 80 TABLET, CHEWABLE ORAL at 16:26

## 2020-07-24 RX ADMIN — HYDROMORPHONE HYDROCHLORIDE 2 MG: 2 TABLET ORAL at 03:31

## 2020-07-24 RX ADMIN — ONDANSETRON 4 MG: 4 TABLET, ORALLY DISINTEGRATING ORAL at 16:26

## 2020-07-24 RX ADMIN — MULTIPLE VITAMINS W/ MINERALS TAB 1 TABLET: TAB at 08:11

## 2020-07-24 RX ADMIN — HYDROMORPHONE HYDROCHLORIDE 2 MG: 2 TABLET ORAL at 10:23

## 2020-07-24 RX ADMIN — ONDANSETRON 4 MG: 4 TABLET, ORALLY DISINTEGRATING ORAL at 21:33

## 2020-07-24 RX ADMIN — CITALOPRAM HYDROBROMIDE 20 MG: 20 TABLET, FILM COATED ORAL at 08:11

## 2020-07-24 RX ADMIN — SIMETHICONE 80 MG: 80 TABLET, CHEWABLE ORAL at 10:22

## 2020-07-24 RX ADMIN — FAMOTIDINE 20 MG: 20 TABLET ORAL at 08:11

## 2020-07-24 RX ADMIN — HYDROMORPHONE HYDROCHLORIDE 1 MG: 1 INJECTION, SOLUTION INTRAMUSCULAR; INTRAVENOUS; SUBCUTANEOUS at 08:12

## 2020-07-24 RX ADMIN — PIPERACILLIN AND TAZOBACTAM 3.38 G: 3; .375 INJECTION, POWDER, FOR SOLUTION INTRAVENOUS at 14:18

## 2020-07-24 RX ADMIN — Medication 5 ML: at 03:31

## 2020-07-24 RX ADMIN — HYDROMORPHONE HYDROCHLORIDE 2 MG: 2 TABLET ORAL at 07:26

## 2020-07-24 RX ADMIN — SODIUM CHLORIDE 1000 ML: 9 INJECTION, SOLUTION INTRAVENOUS at 08:20

## 2020-07-24 RX ADMIN — HYDROMORPHONE HYDROCHLORIDE 1 MG: 1 INJECTION, SOLUTION INTRAMUSCULAR; INTRAVENOUS; SUBCUTANEOUS at 21:33

## 2020-07-24 RX ADMIN — HYDROMORPHONE HYDROCHLORIDE 2 MG: 2 TABLET ORAL at 13:29

## 2020-07-24 RX ADMIN — HYDROMORPHONE HYDROCHLORIDE 4 MG: 2 TABLET ORAL at 19:26

## 2020-07-24 RX ADMIN — METRONIDAZOLE 500 MG: 500 TABLET ORAL at 08:11

## 2020-07-24 RX ADMIN — PANTOPRAZOLE SODIUM 40 MG: 40 TABLET, DELAYED RELEASE ORAL at 08:11

## 2020-07-24 RX ADMIN — SODIUM CHLORIDE: 9 INJECTION, SOLUTION INTRAVENOUS at 21:34

## 2020-07-24 ASSESSMENT — ACTIVITIES OF DAILY LIVING (ADL)
ADLS_ACUITY_SCORE: 12

## 2020-07-24 ASSESSMENT — PAIN DESCRIPTION - DESCRIPTORS
DESCRIPTORS: ACHING;SHOOTING
DESCRIPTORS: ACHING;SHOOTING
DESCRIPTORS: ACHING;SHOOTING;TIGHTNESS

## 2020-07-24 ASSESSMENT — MIFFLIN-ST. JEOR: SCORE: 1410.1

## 2020-07-24 NOTE — PROGRESS NOTES
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  CT scan of the abdomen and pelvis obtained revealed thickening and enhancement of the bile duct proximal to the choledochojejunostomy with surrounding inflammation concerning for cholangitis.  Her liver tests are mildly abnormal including alkaline phosphatase that is increased from her baseline.  Her white blood cell count was 25,000 on admission but did just receive Neulasta 4 days prior to admission. This initially normalized but is slightly increased today.    At this point it is still unclear if her symptoms are related to cholangitis despite CT findings.  She does not have any biliary dilation. She is improving with conservative management (antibiotics and analgesia). Tbili bumped to 1.7 but down trended again today. Her WBC did bump up today after de-escalating her antibiotics. We will plan for enteroscopy ERCP on Monday 7/27, sooner if she has s/s decompensation.     Recommendations:  Continue IV antibiotics  ERCP planned for Monday 7/27  Follow hepatic panel  Analgesia/Antiemetics per primary team  Discussed with primary team Dr. Mendiola    The patient was discussed and plan agreed upon with GI staff, Dr Aviles.      Mirta Neri PA-C  Advanced Endoscopy/Pancreaticobiliary GI Service  [facility]  Pager *[phone]  Text Page  _______________________________________________________________      Subjective: Nursing notes and 24hr events reviewed. Patient seen and examined at 1000. Reports that she had pain flare yesterday which was rough. Greasy foods like eggs made the pain worse so  "only had fruit for dinner. Had three loose stools yesterday. No vomiting but feeling nauseated. No fevers.    ROS:   4 pt ROS negative unless noted in subjective.     Objective:  Blood pressure 96/54, pulse 108, temperature 97.6  F (36.4  C), temperature source Temporal, resp. rate 18, height 1.727 m (5' 8\"), weight 75.7 kg (166 lb 12.8 oz), last menstrual period 09/23/2014, SpO2 94 %, not currently breastfeeding.  Gen: Sitting comfortably in bed. NAD  HEENT: NCAT. Conjunctiva clear. Sclera anicteric  Resp: normal work of breathing  Abd: tender RUQ, ND, +BS  Msk: no gross deformity  Skin:  no jaundice  Ext: warm, well perfused  Neuro: grossly normal  Mental status/Psych: A&O. Asks/answers questions appropriately     LABS:  BMP  Recent Labs   Lab 07/24/20  0333 07/23/20  0925 07/23/20  0001 07/21/20  0329  07/20/20  1509    139  --  139  --  139   POTASSIUM 3.4 3.4  --  3.4  --  3.4   CHLORIDE 105 105  --  109  --  109   PETRA 8.0* 7.9*  --  7.5*  --  7.7*   CO2 29 29  --  27  --  26   BUN 3* 3*  --  7  --  7   CR 0.79 0.76 0.81 0.74   < > 0.71   * 95  --  96  --  99    < > = values in this interval not displayed.     CBC  Recent Labs   Lab 07/24/20  0333 07/23/20  0925 07/23/20  0525 07/21/20  1309 07/21/20  0329   WBC 12.7* 8.3  --  8.4 13.4*   RBC 3.27* 3.20*  --  2.79* 2.87*   HGB 8.7* 8.6*  --  7.5* 7.8*   HCT 28.9* 28.1*  --  24.4* 25.0*   MCV 88 88  --  88 87   MCH 26.6 26.9  --  26.9 27.2   MCHC 30.1* 30.6*  --  30.7* 31.2*   RDW 23.3* 23.1*  --  22.7* 22.8*    110* 98* 72* 67*     INR  Recent Labs   Lab 07/20/20  1509   INR 1.20*     LFTs  Recent Labs   Lab 07/24/20  0333 07/23/20  0925 07/22/20  0903 07/21/20  0329   ALKPHOS 219* 229* 235* 216*   AST 50* 57* 58* 49*   ALT 34 38 42 39   BILITOTAL 0.6 1.7* 0.5 0.5   PROTTOTAL 4.8* 5.0* 5.2* 4.6*   ALBUMIN 1.9* 2.0* 2.0* 1.8*      PANC  Recent Labs   Lab 07/20/20  1509   LIPASE 31*         IMAGING:  reviewed    "

## 2020-07-24 NOTE — PLAN OF CARE
5C PT deferred  Discharge Planner PT   Patient plan for discharge: Home  Current status: PT orders acknowledged appreciated. Pt demonstrates independence with bed mobility, transfers, and gait in room, no concerns with balance. Pt anticipating discharge in 2-3 days. No acute PT needs identified at this time, will complete orders. Please re-consult if needs arise.  Barriers to return to prior living situation: None per PT  Recommendations for discharge: Home  Rationale for recommendations: See above       Entered by: Ruthie Lares 07/24/2020 10:58 AM

## 2020-07-24 NOTE — PLAN OF CARE
OT 5C - Defer    Per conversation with PT, patient up IND in room, completed shower IND, is at ADL baseline, with no concerns for performance at home. Short hospital stay anticipated. Will complete orders. Please re-order OT if hospital stay is extended and needs are identified.

## 2020-07-24 NOTE — PLAN OF CARE
Patient remains A&O, pleasant and up ad nelida reporting pain despite giving as needed PO and IV dilaudid, team notified and increased PO dose- patient reporting more effective for pain and not requiring IV breakthrough as prior. Zofran and simethicone given as needed, patient with fair-good appetite on regular diet. 1L bolus NS given for soft BP's/tachycardia, NS starting at 100 ml/hr, PO atx returned to IV.    visiting at bedside. Plan for ERCP Monday.

## 2020-07-24 NOTE — PLAN OF CARE
Afebrile. HoTN (90's/50's). Tachy 110's. OVSS. Continues to have abdominal pain, PO dilaudid x2 and IV dilaudid x1. Intermittently nauseated, zofran x1. Simethicone x2. Sepsis alert triggered, lactic was 1.3. NPO for possible ERCP today. Voiding adequately. 1 loose stool. Up independently. Possibly discharge home today. Continue plan of care.         Problem: Adult Inpatient Plan of Care  Goal: Plan of Care Review  7/24/2020 0549 by Kalpana Tapia RN  Outcome: No Change  Flowsheets (Taken 7/24/2020 0549)  Plan of Care Reviewed With: patient  Progress: no change     Problem: Adult Inpatient Plan of Care  Goal: Patient-Specific Goal (Individualization)  7/24/2020 0549 by Kalpana Tapia RN  Outcome: No Change     Problem: Adult Inpatient Plan of Care  Goal: Absence of Hospital-Acquired Illness or Injury  7/24/2020 0549 by Kalpana Tapia RN  Outcome: No Change     Problem: Adult Inpatient Plan of Care  Goal: Optimal Comfort and Wellbeing  7/24/2020 0549 by Kalpana Tapia RN  Outcome: No Change     Problem: Adult Inpatient Plan of Care  Goal: Readiness for Transition of Care  7/24/2020 0549 by Kalpana Tapia RN  Outcome: No Change     Problem: Adult Inpatient Plan of Care  Goal: Readiness for Transition of Care  7/24/2020 0549 by Kalpana Tapia RN  Outcome: No Change     Problem: Adult Inpatient Plan of Care  Goal: Rounds/Family Conference  7/24/2020 0549 by Kalpana Tapia RN  Outcome: No Change     Problem: Cardiac Disease Comorbidity  Goal: Cardiac Disease  Description: Patient comorbidity will be monitored for signs and symptoms of Cardiac Disease.  Problems will be absent, minimized or managed by discharge/transition of care.  7/24/2020 0549 by Kalpana Tapia RN  Outcome: No Change     Problem: Gastrointestinal Condition Comorbidity  Goal: Gastrointestinal Condition  Description: Patient comorbidity will be monitored for signs and symptoms of Gastrointestinal condition.  Problems will be absent,  minimized or managed by discharge/transition of care.  7/24/2020 0549 by Kalpana Tapia, RN  Outcome: No Change     Problem: Pain Chronic (Persistent) (Comorbidity Management)  Goal: Acceptable Pain Control and Functional Ability  7/24/2020 0549 by Kalpana Tapia, RN  Outcome: No Change     Problem: Pain Acute  Goal: Optimal Pain Control  7/24/2020 0549 by Kalpana Tapia, RN  Outcome: No Change

## 2020-07-24 NOTE — PROGRESS NOTES
"[facility] Progress Note - Hospitalist Service, Gold 11       Date of Admission: 7/20/2020    Brief Hospital Course  This is a 54-year-old female with a past medical history of gastric adenocarcinoma status post chemotherapy, patient has additionally had complications associated with pancreatic cyst requiring Whipple procedure in early 2020.  Patient presented secondary to epigastric pain.  CT imaging was concerning for acute cholangitis.  Associated symptoms included nausea and vomiting.  Patient was started on empiric coverage antibiotics with Flagyl and Levaquin and transition to Flagyl cefepime.     UA revealed UTI without clinical picture.     Noted recent administration of  GCSF on 7/16    Assessment   Principal Problem:    Cholangitis  Active Problems:    MDD (major depressive disorder), recurrent, in partial remission (H)    Low urine output    Leukocytosis    Intraabdominal fluid collection    Gastric adenocarcinoma (H)    Thrombocytopenia (H)    Urinary tract infection      Plan     Worsening abdominal pain on oral abx. Transition to IV  Discussed with GI ERCP on Monday  Cdiff PRC if bowel movement are loose or increase infrequency  Continue PO meds for pain. Will transition to equivalent oxycodone dosing after procedure and wean.    PT/OT  Discharge held for ERCP    Subjective   Chief complaint: \"Abdominal pain returned\"    Persistent abdominal pain.  Worsened from yesterday.  No nausea vomiting fevers chills chest pain shortness of breath or palpitations  3 bowel movements noted. 1 was loose but 2 others were well formed    Review of Systems  10 point ROS performed and is negative except as mention above     Physical Exam  Vital signs:  Temp: 97.6  F (36.4  C) Temp src: Temporal BP: 96/54 Pulse: 108 Heart Rate: 106 Resp: 18 SpO2: 94 % O2 Device: None (Room air)   Height: 172.7 cm (5' 8\") Weight: 75.7 kg (166 lb 12.8 oz)  Estimated body mass index is 25.36 " "kg/m  as calculated from the following:    Height as of this encounter: 1.727 m (5' 8\").    Weight as of this encounter: 75.7 kg (166 lb 12.8 oz).        Physical Exam  Constitutional:       Appearance: Normal appearance.   HENT:      Head: Normocephalic and atraumatic.      Mouth/Throat:      Mouth: Mucous membranes are moist.   Cardiovascular:      Rate and Rhythm: Normal rate and regular rhythm.      Heart sounds: No murmur. No friction rub. No gallop.    Abdominal:      General: Abdomen is flat. Bowel sounds are normal. There is no distension.      Palpations: There is no mass.      Tenderness: There is abdominal tenderness. There is guarding.   Skin:     Coloration: Skin is not jaundiced.   Neurological:      General: No focal deficit present.      Mental Status: She is alert and oriented to person, place, and time.      Cranial Nerves: No cranial nerve deficit.      Sensory: No sensory deficit.      Motor: No weakness.   Psychiatric:         Mood and Affect: Mood normal.         Behavior: Behavior normal.           Labs  Lab Results   Component Value Date    WBC 12.7 (H) 07/24/2020    HGB 8.7 (L) 07/24/2020    HCT 28.9 (L) 07/24/2020     07/24/2020     07/24/2020    POTASSIUM 3.4 07/24/2020    CHLORIDE 105 07/24/2020    CO2 29 07/24/2020    BUN 3 (L) 07/24/2020    CR 0.79 07/24/2020     (H) 07/24/2020    SED 27 07/20/2020    DD 1.2 (H) 07/22/2020    NTBNPI 412 01/30/2020    NTBNP 184 (H) 01/17/2020    TROPI <0.015 05/21/2020    AST 50 (H) 07/24/2020    ALT 34 07/24/2020    ALKPHOS 219 (H) 07/24/2020    BILITOTAL 0.6 07/24/2020    INR 1.20 (H) 07/20/2020         Imaging   Results for orders placed or performed during the hospital encounter of 07/20/20   CT Abdomen Pelvis w Contrast    Impression    IMPRESSION:   1. Thickening and enhancement of the bile duct proximal to the  choledochojejunostomy with surrounding inflammatory change, slightly  increased, as can be seen with cholangitis.  2. " Fluid with mild adjacent peritoneal thickening and enhancement in  the pelvis posterior to the uterus. This could represent small volume  ascites or a subtle sign of peritonitis.  3. Subtle thickening and enhancement of portions of the jejunum,  sigmoid colon, and proximal transverse colon, this may be secondary to  lack of full distention however or mild enterocolitis.  2. Stable soft tissue nodularity along the right abdominal wall and  right inguinal region, this is favored to be secondary to fat necrosis  from prior fluid collections presence but lack of activity on PET/CT.  3. Decreased main pancreatic ductal dilation.  4. Subtle hazy appearance of the mesentery, this is presumed to be  reactive in nature.  5. Mild left lower lobe groundglass opacity, nonspecific, infection  versus atelectasis.    I have personally reviewed the examination and initial interpretation  and I agree with the findings.    MD Julien ORTEGA MD on 7/21/2020 at 12:41 PM    Time Spent: 25+ minutes

## 2020-07-25 ENCOUNTER — APPOINTMENT (OUTPATIENT)
Dept: CARDIOLOGY | Facility: CLINIC | Age: 55
DRG: 445 | End: 2020-07-25
Attending: INTERNAL MEDICINE
Payer: MEDICARE

## 2020-07-25 LAB
ANION GAP SERPL CALCULATED.3IONS-SCNC: 4 MMOL/L (ref 3–14)
ANISOCYTOSIS BLD QL SMEAR: ABNORMAL
BASOPHILS # BLD AUTO: 0 10E9/L (ref 0–0.2)
BASOPHILS NFR BLD AUTO: 0 %
BUN SERPL-MCNC: 3 MG/DL (ref 7–30)
CALCIUM SERPL-MCNC: 7.7 MG/DL (ref 8.5–10.1)
CHLORIDE SERPL-SCNC: 107 MMOL/L (ref 94–109)
CO2 SERPL-SCNC: 28 MMOL/L (ref 20–32)
CREAT SERPL-MCNC: 0.75 MG/DL (ref 0.52–1.04)
DACRYOCYTES BLD QL SMEAR: SLIGHT
DIFFERENTIAL METHOD BLD: ABNORMAL
EOSINOPHIL # BLD AUTO: 0.1 10E9/L (ref 0–0.7)
EOSINOPHIL NFR BLD AUTO: 0.9 %
ERYTHROCYTE [DISTWIDTH] IN BLOOD BY AUTOMATED COUNT: 23.9 % (ref 10–15)
GFR SERPL CREATININE-BSD FRML MDRD: >90 ML/MIN/{1.73_M2}
GLUCOSE SERPL-MCNC: 86 MG/DL (ref 70–99)
HCT VFR BLD AUTO: 26 % (ref 35–47)
HGB BLD-MCNC: 7.8 G/DL (ref 11.7–15.7)
HYPOCHROMIA BLD QL: PRESENT
LACTATE BLD-SCNC: 0.6 MMOL/L (ref 0.7–2)
LYMPHOCYTES # BLD AUTO: 3 10E9/L (ref 0.8–5.3)
LYMPHOCYTES NFR BLD AUTO: 18.8 %
MCH RBC QN AUTO: 26.8 PG (ref 26.5–33)
MCHC RBC AUTO-ENTMCNC: 30 G/DL (ref 31.5–36.5)
MCV RBC AUTO: 89 FL (ref 78–100)
MONOCYTES # BLD AUTO: 0.7 10E9/L (ref 0–1.3)
MONOCYTES NFR BLD AUTO: 4.5 %
NEUTROPHILS # BLD AUTO: 12 10E9/L (ref 1.6–8.3)
NEUTROPHILS NFR BLD AUTO: 75.8 %
NRBC # BLD AUTO: 0.1 10*3/UL
NRBC BLD AUTO-RTO: 1 /100
PLATELET # BLD AUTO: 155 10E9/L (ref 150–450)
PLATELET # BLD EST: ABNORMAL 10*3/UL
POIKILOCYTOSIS BLD QL SMEAR: SLIGHT
POLYCHROMASIA BLD QL SMEAR: SLIGHT
POTASSIUM SERPL-SCNC: 3.2 MMOL/L (ref 3.4–5.3)
RBC # BLD AUTO: 2.91 10E12/L (ref 3.8–5.2)
SODIUM SERPL-SCNC: 139 MMOL/L (ref 133–144)
TARGETS BLD QL SMEAR: SLIGHT
WBC # BLD AUTO: 15.8 10E9/L (ref 4–11)

## 2020-07-25 PROCEDURE — 93010 ELECTROCARDIOGRAM REPORT: CPT | Performed by: INTERNAL MEDICINE

## 2020-07-25 PROCEDURE — 93005 ELECTROCARDIOGRAM TRACING: CPT

## 2020-07-25 PROCEDURE — 93306 TTE W/DOPPLER COMPLETE: CPT | Mod: 26 | Performed by: INTERNAL MEDICINE

## 2020-07-25 PROCEDURE — 25000132 ZZH RX MED GY IP 250 OP 250 PS 637: Mod: GY | Performed by: INTERNAL MEDICINE

## 2020-07-25 PROCEDURE — 85025 COMPLETE CBC W/AUTO DIFF WBC: CPT | Performed by: INTERNAL MEDICINE

## 2020-07-25 PROCEDURE — 25800030 ZZH RX IP 258 OP 636: Performed by: PHYSICIAN ASSISTANT

## 2020-07-25 PROCEDURE — 80048 BASIC METABOLIC PNL TOTAL CA: CPT | Performed by: INTERNAL MEDICINE

## 2020-07-25 PROCEDURE — 25000128 H RX IP 250 OP 636: Performed by: PHYSICIAN ASSISTANT

## 2020-07-25 PROCEDURE — 25000132 ZZH RX MED GY IP 250 OP 250 PS 637: Mod: GY | Performed by: PHYSICIAN ASSISTANT

## 2020-07-25 PROCEDURE — 93306 TTE W/DOPPLER COMPLETE: CPT

## 2020-07-25 PROCEDURE — 12000012 ZZH R&B MS OVERFLOW UMMC

## 2020-07-25 PROCEDURE — 25000132 ZZH RX MED GY IP 250 OP 250 PS 637: Mod: GY | Performed by: HOSPITALIST

## 2020-07-25 PROCEDURE — 83605 ASSAY OF LACTIC ACID: CPT | Performed by: INTERNAL MEDICINE

## 2020-07-25 PROCEDURE — 99232 SBSQ HOSP IP/OBS MODERATE 35: CPT | Performed by: INTERNAL MEDICINE

## 2020-07-25 PROCEDURE — 25000128 H RX IP 250 OP 636: Performed by: INTERNAL MEDICINE

## 2020-07-25 RX ADMIN — HYDROMORPHONE HYDROCHLORIDE 4 MG: 2 TABLET ORAL at 20:51

## 2020-07-25 RX ADMIN — PIPERACILLIN AND TAZOBACTAM 3.38 G: 3; .375 INJECTION, POWDER, FOR SOLUTION INTRAVENOUS at 20:52

## 2020-07-25 RX ADMIN — ONDANSETRON 4 MG: 4 TABLET, ORALLY DISINTEGRATING ORAL at 02:52

## 2020-07-25 RX ADMIN — HYDROMORPHONE HYDROCHLORIDE 4 MG: 2 TABLET ORAL at 18:05

## 2020-07-25 RX ADMIN — ONDANSETRON 4 MG: 4 TABLET, ORALLY DISINTEGRATING ORAL at 14:55

## 2020-07-25 RX ADMIN — FAMOTIDINE 20 MG: 20 TABLET ORAL at 08:19

## 2020-07-25 RX ADMIN — PANTOPRAZOLE SODIUM 40 MG: 40 TABLET, DELAYED RELEASE ORAL at 20:03

## 2020-07-25 RX ADMIN — SIMETHICONE 80 MG: 80 TABLET, CHEWABLE ORAL at 02:52

## 2020-07-25 RX ADMIN — MULTIPLE VITAMINS W/ MINERALS TAB 1 TABLET: TAB at 08:20

## 2020-07-25 RX ADMIN — ENOXAPARIN SODIUM 40 MG: 40 INJECTION SUBCUTANEOUS at 08:46

## 2020-07-25 RX ADMIN — POTASSIUM CHLORIDE 20 MEQ: 750 TABLET, EXTENDED RELEASE ORAL at 08:20

## 2020-07-25 RX ADMIN — FAMOTIDINE 20 MG: 20 TABLET ORAL at 20:03

## 2020-07-25 RX ADMIN — HYDROMORPHONE HYDROCHLORIDE 4 MG: 2 TABLET ORAL at 14:55

## 2020-07-25 RX ADMIN — ONDANSETRON 4 MG: 4 TABLET, ORALLY DISINTEGRATING ORAL at 20:52

## 2020-07-25 RX ADMIN — SIMETHICONE 80 MG: 80 TABLET, CHEWABLE ORAL at 20:51

## 2020-07-25 RX ADMIN — AMITRIPTYLINE HYDROCHLORIDE 75 MG: 75 TABLET, FILM COATED ORAL at 21:01

## 2020-07-25 RX ADMIN — SIMETHICONE 80 MG: 80 TABLET, CHEWABLE ORAL at 14:56

## 2020-07-25 RX ADMIN — PIPERACILLIN AND TAZOBACTAM 3.38 G: 3; .375 INJECTION, POWDER, FOR SOLUTION INTRAVENOUS at 08:28

## 2020-07-25 RX ADMIN — HYDROMORPHONE HYDROCHLORIDE 4 MG: 2 TABLET ORAL at 08:20

## 2020-07-25 RX ADMIN — CITALOPRAM HYDROBROMIDE 20 MG: 20 TABLET, FILM COATED ORAL at 08:20

## 2020-07-25 RX ADMIN — SIMETHICONE 80 MG: 80 TABLET, CHEWABLE ORAL at 08:19

## 2020-07-25 RX ADMIN — PIPERACILLIN AND TAZOBACTAM 3.38 G: 3; .375 INJECTION, POWDER, FOR SOLUTION INTRAVENOUS at 02:52

## 2020-07-25 RX ADMIN — HYDROMORPHONE HYDROCHLORIDE 4 MG: 2 TABLET ORAL at 00:14

## 2020-07-25 RX ADMIN — HYDROMORPHONE HYDROCHLORIDE 4 MG: 2 TABLET ORAL at 02:52

## 2020-07-25 RX ADMIN — PIPERACILLIN AND TAZOBACTAM 3.38 G: 3; .375 INJECTION, POWDER, FOR SOLUTION INTRAVENOUS at 14:56

## 2020-07-25 RX ADMIN — PANTOPRAZOLE SODIUM 40 MG: 40 TABLET, DELAYED RELEASE ORAL at 08:20

## 2020-07-25 RX ADMIN — HYDROMORPHONE HYDROCHLORIDE 4 MG: 2 TABLET ORAL at 11:56

## 2020-07-25 RX ADMIN — SODIUM CHLORIDE: 9 INJECTION, SOLUTION INTRAVENOUS at 10:59

## 2020-07-25 RX ADMIN — ONDANSETRON 4 MG: 4 TABLET, ORALLY DISINTEGRATING ORAL at 08:19

## 2020-07-25 ASSESSMENT — PAIN DESCRIPTION - DESCRIPTORS
DESCRIPTORS: ACHING;CONSTANT;SHOOTING;SHARP
DESCRIPTORS: ACHING;CONSTANT;SHOOTING;SHARP
DESCRIPTORS: ACHING;SHOOTING
DESCRIPTORS: ACHING;SHOOTING
DESCRIPTORS: ACHING;CONSTANT;SHOOTING;SHARP

## 2020-07-25 ASSESSMENT — MIFFLIN-ST. JEOR
SCORE: 1443.21
SCORE: 1443.21

## 2020-07-25 ASSESSMENT — ACTIVITIES OF DAILY LIVING (ADL)
ADLS_ACUITY_SCORE: 12

## 2020-07-25 NOTE — PLAN OF CARE
Afebrile, soft BPs (98/62), slightly tachycardic, OVSS. Adequate, though not great, UOP today (975 mL today). Bladder scan showed 5 mL post void. Utilizing dilaudid q 3 hrs PRN and zofran/simethicone q 6 hrs PRN. Plan for ERCP on Monday. Will continue to monitor.     Problem: Adult Inpatient Plan of Care  Goal: Plan of Care Review  7/25/2020 1852 by Omaira Akhtar, RN  Outcome: No Change     Problem: Adult Inpatient Plan of Care  Goal: Patient-Specific Goal (Individualization)  7/25/2020 1852 by Omaira Akhtar, RN  Outcome: No Change     Problem: Pain Chronic (Persistent) (Comorbidity Management)  Goal: Acceptable Pain Control and Functional Ability  7/25/2020 1852 by Omaira Akhtar, RN  Outcome: No Change     Problem: Pain Acute  Goal: Optimal Pain Control  7/25/2020 1852 by Omaira Akhtar, RN  Outcome: No Change

## 2020-07-25 NOTE — PROGRESS NOTES
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  Patient presented secondary to epigastric pain.  CT imaging was concerning for acute cholangitis.  Associated symptoms included nausea and vomiting.  Patient was started on empiric coverage antibiotics with Flagyl and Levaquin and transition to Flagyl cefepime.     UA revealed UTI without clinical picture.     Noted recent administration of  GCSF on 7/16    Assessment   Principal Problem:    Cholangitis  Active Problems:    MDD (major depressive disorder), recurrent, in partial remission (H)    Low urine output    Leukocytosis    Intraabdominal fluid collection    Gastric adenocarcinoma (H)    Thrombocytopenia (H)    Urinary tract infection      Plan   If requiring increased narcotics will try using an alternative agent such as oral morphine. States pain is stable currently   Bowel movements not consistent with C diff  Persistent tachycardia. Will order troponin, ekg, ECHO   ERCP  Patient received oral potassium this AM. Will hold additional supplementation at this time.   Decreasing urine out put in last 24 hours. Will obtain bladder scan to ensure no retention.   Strict in/out    Subjective   Chief complaint: \"Abdominal pain returned\"    Minimal urin output   No nausea vomiting fevers chills chest pain shortness of breath or palpitations   1 BM this AM    Review of Systems  10 point ROS performed and is negative except as mention above     Physical Exam  Vital signs:  Temp: 98  F (36.7  C) Temp src: Temporal BP: 103/63   Heart Rate: 111 Resp: 18 SpO2: 96 % O2 Device: None (Room air)   Height: 172.7 cm (5' 8\") Weight: 79 kg " "(174 lb 1.6 oz)  Estimated body mass index is 26.47 kg/m  as calculated from the following:    Height as of this encounter: 1.727 m (5' 8\").    Weight as of this encounter: 79 kg (174 lb 1.6 oz).        Physical Exam  Constitutional:       Appearance: Normal appearance.   HENT:      Head: Normocephalic and atraumatic.      Mouth/Throat:      Mouth: Mucous membranes are moist.   Cardiovascular:      Rate and Rhythm: Normal rate and regular rhythm.      Heart sounds: No murmur. No friction rub. No gallop.    Abdominal:      General: Abdomen is flat. Bowel sounds are normal. There is no distension.      Palpations: There is no mass.      Tenderness: There is abdominal tenderness. There is guarding.   Skin:     Coloration: Skin is not jaundiced.   Neurological:      General: No focal deficit present.      Mental Status: She is alert and oriented to person, place, and time.      Cranial Nerves: No cranial nerve deficit.      Sensory: No sensory deficit.      Motor: No weakness.   Psychiatric:         Mood and Affect: Mood normal.         Behavior: Behavior normal.           Labs  Lab Results   Component Value Date    WBC 15.8 (H) 07/25/2020    HGB 7.8 (L) 07/25/2020    HCT 26.0 (L) 07/25/2020     07/25/2020     07/25/2020    POTASSIUM 3.2 (L) 07/25/2020    CHLORIDE 107 07/25/2020    CO2 28 07/25/2020    BUN 3 (L) 07/25/2020    CR 0.75 07/25/2020    GLC 86 07/25/2020    SED 27 07/20/2020    DD 1.2 (H) 07/22/2020    NTBNPI 412 01/30/2020    NTBNP 184 (H) 01/17/2020    TROPI <0.015 05/21/2020    AST 50 (H) 07/24/2020    ALT 34 07/24/2020    ALKPHOS 219 (H) 07/24/2020    BILITOTAL 0.6 07/24/2020    INR 1.20 (H) 07/20/2020         Imaging   Results for orders placed or performed during the hospital encounter of 07/20/20   CT Abdomen Pelvis w Contrast    Impression    IMPRESSION:   1. Thickening and enhancement of the bile duct proximal to the  choledochojejunostomy with surrounding inflammatory change, " slightly  increased, as can be seen with cholangitis.  2. Fluid with mild adjacent peritoneal thickening and enhancement in  the pelvis posterior to the uterus. This could represent small volume  ascites or a subtle sign of peritonitis.  3. Subtle thickening and enhancement of portions of the jejunum,  sigmoid colon, and proximal transverse colon, this may be secondary to  lack of full distention however or mild enterocolitis.  2. Stable soft tissue nodularity along the right abdominal wall and  right inguinal region, this is favored to be secondary to fat necrosis  from prior fluid collections presence but lack of activity on PET/CT.  3. Decreased main pancreatic ductal dilation.  4. Subtle hazy appearance of the mesentery, this is presumed to be  reactive in nature.  5. Mild left lower lobe groundglass opacity, nonspecific, infection  versus atelectasis.    I have personally reviewed the examination and initial interpretation  and I agree with the findings.    MD Julien ORTEGA MD on 7/21/2020 at 12:41 PM    Time Spent: 25+ minutes

## 2020-07-25 NOTE — PLAN OF CARE
"BP 92/61 (BP Location: Left arm)   Pulse 108   Temp 98.8  F (37.1  C) (Temporal)   Resp 18   Ht 1.727 m (5' 8\")   Wt 79 kg (174 lb 1.6 oz)   LMP 09/23/2014   SpO2 96%   BMI 26.47 kg/m       VSS on RA sating >95%. Adequate UO, BM x0, n/v managed with prn zofran, gas pain managed with simethicone, right abdominal pain managed with prn PO dilaudid given x3 and IV dilaudid given x1 for pain break through with good results. NS at 100 mL/hr through port. Sepsis protocol triggered d/t increased WBC count, LA came back WDL. Weight completed and no replacements this morning. Possible ERCP on Monday, continue with POC.      Problem: Adult Inpatient Plan of Care  Goal: Plan of Care Review  Outcome: No Change  Goal: Patient-Specific Goal (Individualization)  Outcome: No Change  Goal: Absence of Hospital-Acquired Illness or Injury  Outcome: No Change  Goal: Readiness for Transition of Care  Outcome: No Change  Goal: Rounds/Family Conference  Outcome: No Change     Problem: Cardiac Disease Comorbidity  Goal: Cardiac Disease  Description: Patient comorbidity will be monitored for signs and symptoms of Cardiac Disease.  Problems will be absent, minimized or managed by discharge/transition of care.  Outcome: No Change     Problem: Gastrointestinal Condition Comorbidity  Goal: Gastrointestinal Condition  Description: Patient comorbidity will be monitored for signs and symptoms of Gastrointestinal condition.  Problems will be absent, minimized or managed by discharge/transition of care.  Outcome: No Change     Problem: Pain Acute  Goal: Optimal Pain Control  Outcome: No Change     "

## 2020-07-26 ENCOUNTER — ANESTHESIA EVENT (OUTPATIENT)
Dept: SURGERY | Facility: CLINIC | Age: 55
DRG: 445 | End: 2020-07-26
Payer: MEDICARE

## 2020-07-26 LAB
ALBUMIN SERPL-MCNC: 1.8 G/DL (ref 3.4–5)
ALP SERPL-CCNC: 175 U/L (ref 40–150)
ALT SERPL W P-5'-P-CCNC: 22 U/L (ref 0–50)
ANION GAP SERPL CALCULATED.3IONS-SCNC: 6 MMOL/L (ref 3–14)
ANISOCYTOSIS BLD QL SMEAR: ABNORMAL
AST SERPL W P-5'-P-CCNC: 31 U/L (ref 0–45)
BASOPHILS # BLD AUTO: 0 10E9/L (ref 0–0.2)
BASOPHILS NFR BLD AUTO: 0 %
BILIRUB DIRECT SERPL-MCNC: 0.1 MG/DL (ref 0–0.2)
BILIRUB SERPL-MCNC: 0.3 MG/DL (ref 0.2–1.3)
BUN SERPL-MCNC: 4 MG/DL (ref 7–30)
CALCIUM SERPL-MCNC: 7.6 MG/DL (ref 8.5–10.1)
CHLORIDE SERPL-SCNC: 109 MMOL/L (ref 94–109)
CO2 SERPL-SCNC: 25 MMOL/L (ref 20–32)
CREAT SERPL-MCNC: 0.78 MG/DL (ref 0.52–1.04)
CREAT SERPL-MCNC: 0.86 MG/DL (ref 0.52–1.04)
DIFFERENTIAL METHOD BLD: ABNORMAL
ELLIPTOCYTES BLD QL SMEAR: SLIGHT
EOSINOPHIL # BLD AUTO: 0 10E9/L (ref 0–0.7)
EOSINOPHIL NFR BLD AUTO: 0 %
ERYTHROCYTE [DISTWIDTH] IN BLOOD BY AUTOMATED COUNT: 24 % (ref 10–15)
GFR SERPL CREATININE-BSD FRML MDRD: 77 ML/MIN/{1.73_M2}
GFR SERPL CREATININE-BSD FRML MDRD: 86 ML/MIN/{1.73_M2}
GLUCOSE SERPL-MCNC: 82 MG/DL (ref 70–99)
HCT VFR BLD AUTO: 25.5 % (ref 35–47)
HGB BLD-MCNC: 7.6 G/DL (ref 11.7–15.7)
INTERPRETATION ECG - MUSE: NORMAL
LACTATE BLD-SCNC: 0.5 MMOL/L (ref 0.7–2)
LYMPHOCYTES # BLD AUTO: 1.4 10E9/L (ref 0.8–5.3)
LYMPHOCYTES NFR BLD AUTO: 8.7 %
MCH RBC QN AUTO: 26.8 PG (ref 26.5–33)
MCHC RBC AUTO-ENTMCNC: 29.8 G/DL (ref 31.5–36.5)
MCV RBC AUTO: 90 FL (ref 78–100)
METAMYELOCYTES # BLD: 0.3 10E9/L
METAMYELOCYTES NFR BLD MANUAL: 1.7 %
MONOCYTES # BLD AUTO: 1 10E9/L (ref 0–1.3)
MONOCYTES NFR BLD AUTO: 6.1 %
MYELOCYTES # BLD: 0.1 10E9/L
MYELOCYTES NFR BLD MANUAL: 0.9 %
NEUTROPHILS # BLD AUTO: 13.4 10E9/L (ref 1.6–8.3)
NEUTROPHILS NFR BLD AUTO: 82.6 %
NRBC # BLD AUTO: 0.1 10*3/UL
NRBC BLD AUTO-RTO: 1 /100
PLATELET # BLD AUTO: 170 10E9/L (ref 150–450)
PLATELET # BLD EST: ABNORMAL 10*3/UL
POIKILOCYTOSIS BLD QL SMEAR: SLIGHT
POLYCHROMASIA BLD QL SMEAR: SLIGHT
POTASSIUM SERPL-SCNC: 3 MMOL/L (ref 3.4–5.3)
POTASSIUM SERPL-SCNC: 3.4 MMOL/L (ref 3.4–5.3)
PROT SERPL-MCNC: 4.4 G/DL (ref 6.8–8.8)
RBC # BLD AUTO: 2.84 10E12/L (ref 3.8–5.2)
SODIUM SERPL-SCNC: 139 MMOL/L (ref 133–144)
T4 FREE SERPL-MCNC: 1.38 NG/DL (ref 0.76–1.46)
TROPONIN I SERPL-MCNC: <0.015 UG/L (ref 0–0.04)
TSH SERPL DL<=0.005 MIU/L-ACNC: 4.16 MU/L (ref 0.4–4)
WBC # BLD AUTO: 16.2 10E9/L (ref 4–11)

## 2020-07-26 PROCEDURE — 84439 ASSAY OF FREE THYROXINE: CPT | Performed by: INTERNAL MEDICINE

## 2020-07-26 PROCEDURE — 84484 ASSAY OF TROPONIN QUANT: CPT | Performed by: INTERNAL MEDICINE

## 2020-07-26 PROCEDURE — U0003 INFECTIOUS AGENT DETECTION BY NUCLEIC ACID (DNA OR RNA); SEVERE ACUTE RESPIRATORY SYNDROME CORONAVIRUS 2 (SARS-COV-2) (CORONAVIRUS DISEASE [COVID-19]), AMPLIFIED PROBE TECHNIQUE, MAKING USE OF HIGH THROUGHPUT TECHNOLOGIES AS DESCRIBED BY CMS-2020-01-R: HCPCS | Performed by: STUDENT IN AN ORGANIZED HEALTH CARE EDUCATION/TRAINING PROGRAM

## 2020-07-26 PROCEDURE — 12000012 ZZH R&B MS OVERFLOW UMMC

## 2020-07-26 PROCEDURE — 25000132 ZZH RX MED GY IP 250 OP 250 PS 637: Mod: GY | Performed by: HOSPITALIST

## 2020-07-26 PROCEDURE — 25000132 ZZH RX MED GY IP 250 OP 250 PS 637: Mod: GY | Performed by: PHYSICIAN ASSISTANT

## 2020-07-26 PROCEDURE — 25000132 ZZH RX MED GY IP 250 OP 250 PS 637: Mod: GY | Performed by: INTERNAL MEDICINE

## 2020-07-26 PROCEDURE — 83605 ASSAY OF LACTIC ACID: CPT | Performed by: INTERNAL MEDICINE

## 2020-07-26 PROCEDURE — 84132 ASSAY OF SERUM POTASSIUM: CPT | Performed by: INTERNAL MEDICINE

## 2020-07-26 PROCEDURE — 80048 BASIC METABOLIC PNL TOTAL CA: CPT | Performed by: INTERNAL MEDICINE

## 2020-07-26 PROCEDURE — 85025 COMPLETE CBC W/AUTO DIFF WBC: CPT | Performed by: INTERNAL MEDICINE

## 2020-07-26 PROCEDURE — 84443 ASSAY THYROID STIM HORMONE: CPT | Performed by: INTERNAL MEDICINE

## 2020-07-26 PROCEDURE — 25000128 H RX IP 250 OP 636: Performed by: PHYSICIAN ASSISTANT

## 2020-07-26 PROCEDURE — 82565 ASSAY OF CREATININE: CPT | Performed by: PHYSICIAN ASSISTANT

## 2020-07-26 PROCEDURE — 80076 HEPATIC FUNCTION PANEL: CPT | Performed by: INTERNAL MEDICINE

## 2020-07-26 PROCEDURE — 25800030 ZZH RX IP 258 OP 636: Performed by: PHYSICIAN ASSISTANT

## 2020-07-26 PROCEDURE — 99232 SBSQ HOSP IP/OBS MODERATE 35: CPT | Performed by: INTERNAL MEDICINE

## 2020-07-26 PROCEDURE — 25000128 H RX IP 250 OP 636: Performed by: INTERNAL MEDICINE

## 2020-07-26 RX ORDER — POTASSIUM CHLORIDE 7.45 MG/ML
10 INJECTION INTRAVENOUS
Status: DISCONTINUED | OUTPATIENT
Start: 2020-07-26 | End: 2020-07-29 | Stop reason: HOSPADM

## 2020-07-26 RX ORDER — POTASSIUM CHLORIDE 7.45 MG/ML
10 INJECTION INTRAVENOUS
Status: DISCONTINUED | OUTPATIENT
Start: 2020-07-26 | End: 2020-07-26

## 2020-07-26 RX ORDER — POTASSIUM CHLORIDE 29.8 MG/ML
20 INJECTION INTRAVENOUS
Status: DISCONTINUED | OUTPATIENT
Start: 2020-07-26 | End: 2020-07-26

## 2020-07-26 RX ORDER — POTASSIUM CHLORIDE 750 MG/1
20-40 TABLET, EXTENDED RELEASE ORAL
Status: DISCONTINUED | OUTPATIENT
Start: 2020-07-26 | End: 2020-07-29 | Stop reason: HOSPADM

## 2020-07-26 RX ORDER — MAGNESIUM SULFATE HEPTAHYDRATE 40 MG/ML
4 INJECTION, SOLUTION INTRAVENOUS EVERY 4 HOURS PRN
Status: DISCONTINUED | OUTPATIENT
Start: 2020-07-26 | End: 2020-07-29 | Stop reason: HOSPADM

## 2020-07-26 RX ORDER — POTASSIUM CHLORIDE 1.5 G/1.58G
20-40 POWDER, FOR SOLUTION ORAL
Status: DISCONTINUED | OUTPATIENT
Start: 2020-07-26 | End: 2020-07-26

## 2020-07-26 RX ORDER — POTASSIUM CHLORIDE 1.5 G/1.58G
20-40 POWDER, FOR SOLUTION ORAL
Status: DISCONTINUED | OUTPATIENT
Start: 2020-07-26 | End: 2020-07-29 | Stop reason: HOSPADM

## 2020-07-26 RX ORDER — POTASSIUM CL/LIDO/0.9 % NACL 10MEQ/0.1L
10 INTRAVENOUS SOLUTION, PIGGYBACK (ML) INTRAVENOUS
Status: DISCONTINUED | OUTPATIENT
Start: 2020-07-26 | End: 2020-07-29 | Stop reason: HOSPADM

## 2020-07-26 RX ORDER — POTASSIUM CHLORIDE 750 MG/1
20-40 TABLET, EXTENDED RELEASE ORAL
Status: DISCONTINUED | OUTPATIENT
Start: 2020-07-26 | End: 2020-07-26

## 2020-07-26 RX ORDER — POTASSIUM CL/LIDO/0.9 % NACL 10MEQ/0.1L
10 INTRAVENOUS SOLUTION, PIGGYBACK (ML) INTRAVENOUS
Status: DISCONTINUED | OUTPATIENT
Start: 2020-07-26 | End: 2020-07-26

## 2020-07-26 RX ORDER — POTASSIUM CHLORIDE 29.8 MG/ML
20 INJECTION INTRAVENOUS
Status: DISCONTINUED | OUTPATIENT
Start: 2020-07-26 | End: 2020-07-29 | Stop reason: HOSPADM

## 2020-07-26 RX ADMIN — SODIUM CHLORIDE: 9 INJECTION, SOLUTION INTRAVENOUS at 03:14

## 2020-07-26 RX ADMIN — ONDANSETRON 4 MG: 4 TABLET, ORALLY DISINTEGRATING ORAL at 08:51

## 2020-07-26 RX ADMIN — AMITRIPTYLINE HYDROCHLORIDE 75 MG: 75 TABLET, FILM COATED ORAL at 21:37

## 2020-07-26 RX ADMIN — HYDROMORPHONE HYDROCHLORIDE 1 MG: 1 INJECTION, SOLUTION INTRAMUSCULAR; INTRAVENOUS; SUBCUTANEOUS at 10:09

## 2020-07-26 RX ADMIN — HYDROMORPHONE HYDROCHLORIDE 4 MG: 2 TABLET ORAL at 03:12

## 2020-07-26 RX ADMIN — SIMETHICONE 80 MG: 80 TABLET, CHEWABLE ORAL at 08:52

## 2020-07-26 RX ADMIN — ONDANSETRON 4 MG: 4 TABLET, ORALLY DISINTEGRATING ORAL at 20:41

## 2020-07-26 RX ADMIN — PIPERACILLIN AND TAZOBACTAM 3.38 G: 3; .375 INJECTION, POWDER, FOR SOLUTION INTRAVENOUS at 15:30

## 2020-07-26 RX ADMIN — HYDROMORPHONE HYDROCHLORIDE 4 MG: 2 TABLET ORAL at 08:52

## 2020-07-26 RX ADMIN — ONDANSETRON 4 MG: 4 TABLET, ORALLY DISINTEGRATING ORAL at 15:32

## 2020-07-26 RX ADMIN — FAMOTIDINE 20 MG: 20 TABLET ORAL at 08:52

## 2020-07-26 RX ADMIN — HYDROMORPHONE HYDROCHLORIDE 4 MG: 2 TABLET ORAL at 00:06

## 2020-07-26 RX ADMIN — HYDROMORPHONE HYDROCHLORIDE 4 MG: 2 TABLET ORAL at 20:33

## 2020-07-26 RX ADMIN — POTASSIUM CHLORIDE 20 MEQ: 750 TABLET, EXTENDED RELEASE ORAL at 10:07

## 2020-07-26 RX ADMIN — SIMETHICONE 80 MG: 80 TABLET, CHEWABLE ORAL at 20:41

## 2020-07-26 RX ADMIN — PIPERACILLIN AND TAZOBACTAM 3.38 G: 3; .375 INJECTION, POWDER, FOR SOLUTION INTRAVENOUS at 08:53

## 2020-07-26 RX ADMIN — PANTOPRAZOLE SODIUM 40 MG: 40 TABLET, DELAYED RELEASE ORAL at 20:33

## 2020-07-26 RX ADMIN — HYDROMORPHONE HYDROCHLORIDE 1 MG: 1 INJECTION, SOLUTION INTRAMUSCULAR; INTRAVENOUS; SUBCUTANEOUS at 14:24

## 2020-07-26 RX ADMIN — PIPERACILLIN AND TAZOBACTAM 3.38 G: 3; .375 INJECTION, POWDER, FOR SOLUTION INTRAVENOUS at 03:12

## 2020-07-26 RX ADMIN — HYDROMORPHONE HYDROCHLORIDE 4 MG: 2 TABLET ORAL at 13:23

## 2020-07-26 RX ADMIN — MULTIPLE VITAMINS W/ MINERALS TAB 1 TABLET: TAB at 08:51

## 2020-07-26 RX ADMIN — SODIUM CHLORIDE: 9 INJECTION, SOLUTION INTRAVENOUS at 14:59

## 2020-07-26 RX ADMIN — CITALOPRAM HYDROBROMIDE 20 MG: 20 TABLET, FILM COATED ORAL at 08:53

## 2020-07-26 RX ADMIN — HYDROMORPHONE HYDROCHLORIDE 4 MG: 2 TABLET ORAL at 06:04

## 2020-07-26 RX ADMIN — SIMETHICONE 80 MG: 80 TABLET, CHEWABLE ORAL at 15:31

## 2020-07-26 RX ADMIN — POTASSIUM CHLORIDE 40 MEQ: 750 TABLET, EXTENDED RELEASE ORAL at 04:25

## 2020-07-26 RX ADMIN — FAMOTIDINE 20 MG: 20 TABLET ORAL at 20:41

## 2020-07-26 RX ADMIN — PANTOPRAZOLE SODIUM 40 MG: 40 TABLET, DELAYED RELEASE ORAL at 08:51

## 2020-07-26 RX ADMIN — PIPERACILLIN AND TAZOBACTAM 3.38 G: 3; .375 INJECTION, POWDER, FOR SOLUTION INTRAVENOUS at 20:33

## 2020-07-26 RX ADMIN — ENOXAPARIN SODIUM 40 MG: 40 INJECTION SUBCUTANEOUS at 08:53

## 2020-07-26 RX ADMIN — SODIUM CHLORIDE: 9 INJECTION, SOLUTION INTRAVENOUS at 14:21

## 2020-07-26 RX ADMIN — HYDROMORPHONE HYDROCHLORIDE 4 MG: 2 TABLET ORAL at 16:48

## 2020-07-26 RX ADMIN — SIMETHICONE 80 MG: 80 TABLET, CHEWABLE ORAL at 03:12

## 2020-07-26 RX ADMIN — HYDROMORPHONE HYDROCHLORIDE 1 MG: 1 INJECTION, SOLUTION INTRAMUSCULAR; INTRAVENOUS; SUBCUTANEOUS at 22:00

## 2020-07-26 RX ADMIN — ONDANSETRON 4 MG: 4 TABLET, ORALLY DISINTEGRATING ORAL at 03:12

## 2020-07-26 RX ADMIN — POTASSIUM CHLORIDE 20 MEQ: 750 TABLET, EXTENDED RELEASE ORAL at 06:04

## 2020-07-26 ASSESSMENT — PAIN DESCRIPTION - DESCRIPTORS
DESCRIPTORS: ACHING;CONSTANT;SHOOTING;SHARP
DESCRIPTORS: ACHING;CONSTANT;SHARP
DESCRIPTORS: ACHING;CONSTANT;SHARP
DESCRIPTORS: ACHING;CONSTANT;SHOOTING;SHARP
DESCRIPTORS: ACHING;CONSTANT;SHARP
DESCRIPTORS: ACHING;CONSTANT;SHARP

## 2020-07-26 ASSESSMENT — MIFFLIN-ST. JEOR
SCORE: 1464.53
SCORE: 1466.8

## 2020-07-26 ASSESSMENT — ACTIVITIES OF DAILY LIVING (ADL)
ADLS_ACUITY_SCORE: 12

## 2020-07-26 ASSESSMENT — LIFESTYLE VARIABLES: TOBACCO_USE: 1

## 2020-07-26 NOTE — PLAN OF CARE
"BP 98/59 (BP Location: Right arm)   Pulse 104   Temp 99.1  F (37.3  C) (Temporal)   Resp 18   Ht 1.727 m (5' 8\")   Wt 81.1 kg (178 lb 12.8 oz)   LMP 09/23/2014   SpO2 96%   BMI 27.19 kg/m       VSS on RA sating >95%. Adequate UO >50mL/hr,small soft BM x2, n/v managed with prn zofran given x2, gas pain managed with prn simethicone given x2, right abdominal pain managed with prn PO dilaudid given x4. NS at 100 mL/hr through port. Sepsis protocol triggered d/t increased WBC count, LA came back WDL. Potassium 60 mEQ replaced with recheck ordered for 10:30 this morning, weight completed. Possible ERCP on Monday, continue with POC.      Problem: Adult Inpatient Plan of Care  Goal: Plan of Care Review  7/26/2020 0505 by Roxana Pop RN  Outcome: No Change     Problem: Adult Inpatient Plan of Care  Goal: Patient-Specific Goal (Individualization)  7/26/2020 0505 by Roxana Pop, RN  Outcome: No Change     Problem: Adult Inpatient Plan of Care  Goal: Absence of Hospital-Acquired Illness or Injury  Outcome: No Change     Problem: Adult Inpatient Plan of Care  Goal: Readiness for Transition of Care  7/26/2020 0505 by Roxana Pop, RN  Outcome: No Change     Problem: Adult Inpatient Plan of Care  Goal: Rounds/Family Conference  Outcome: No Change     Problem: Cardiac Disease Comorbidity  Goal: Cardiac Disease  Description: Patient comorbidity will be monitored for signs and symptoms of Cardiac Disease.  Problems will be absent, minimized or managed by discharge/transition of care.  7/26/2020 0505 by Roxana Pop, RN  Outcome: No Change     Problem: Gastrointestinal Condition Comorbidity  Goal: Gastrointestinal Condition  Description: Patient comorbidity will be monitored for signs and symptoms of Gastrointestinal condition.  Problems will be absent, minimized or managed by discharge/transition of care.  7/26/2020 0505 by Roxana Pop, RN  Outcome: No Change     Problem: Pain Chronic (Persistent) (Comorbidity " Management)  Goal: Acceptable Pain Control and Functional Ability  7/26/2020 0505 by Roxana Pop, RN  Outcome: No Change     Problem: Adult Inpatient Plan of Care  Goal: Optimal Comfort and Wellbeing  7/26/2020 0505 by Roxana Pop, RN  Outcome: Improving     Problem: Pain Acute  Goal: Optimal Pain Control  7/26/2020 0505 by Roxana Pop, RN  Outcome: Improving

## 2020-07-26 NOTE — PLAN OF CARE
Afebrile, intermittently soft BPs (90s/50s), tachycardic, OVSS. C/O pain in RUQ that is constant and sharp. Utilizing more PRNs today; requesting both PO and IV dilaudid. Per conversation with MD it is okay to utilize both if having breakthrough pain. Plan for ERCP tomorrow. Will continue to monitor.     Problem: Adult Inpatient Plan of Care  Goal: Plan of Care Review  7/26/2020 1854 by Omaira Akhtar, RN  Outcome: No Change     Problem: Adult Inpatient Plan of Care  Goal: Patient-Specific Goal (Individualization)  7/26/2020 1854 by Omaira Akhtar, RN  Outcome: No Change     Problem: Pain Acute  Goal: Optimal Pain Control  7/26/2020 1854 by Omaira Akhtar, RN  Outcome: No Change

## 2020-07-26 NOTE — PROVIDER NOTIFICATION
"BP 98/59 (BP Location: Right arm)   Pulse 104   Temp 99.1  F (37.3  C) (Temporal)   Resp 18   Ht 1.727 m (5' 8\")   Wt 81.1 kg (178 lb 12.8 oz)   LMP 09/23/2014   SpO2 96%   BMI 27.19 kg/m       K+ is 3.0. Text paged Gold 11 cross cover MD to see if they want K+ replaced.  "

## 2020-07-26 NOTE — PROGRESS NOTES
"[facility], 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  Patient presented secondary to epigastric pain.  CT imaging was concerning for acute cholangitis.  Associated symptoms included nausea and vomiting.  Patient was started on empiric coverage antibiotics with Flagyl and Levaquin and transition to Flagyl cefepime.     UA revealed UTI without clinical picture.     Noted recent administration of  GCSF on 7/16    Assessment   Principal Problem:    Cholangitis  Active Problems:    MDD (major depressive disorder), recurrent, in partial remission (H)    Low urine output    Leukocytosis    Intraabdominal fluid collection    Gastric adenocarcinoma (H)    Thrombocytopenia (H)    Urinary tract infection      Plan   Continue current pain regmin. Discussed with patient.   Persistent tachycardia. Cardiac function appears within normal limits. Tachycardia is likely from infectious picture. Lactate is WNL  Replace electrolytes. Check Magnesium  Improving urin out put with   Strict in/out normal studies   ERCP tomorrow for possible source control. Discuss hemodynamic status with GI. Will continue to monitor. Clinically appears stable at bedside.     Subjective   Chief complaint: \"Abdominal pain persists but is better controlled with pain meds\"    Increasing urine output from yesterday  No edema  No nausea vomiting fevers chills chest pain shortness of breath or palpitations   1 BM soft     Review of Systems  10 point ROS performed and is negative except as mention above     Physical Exam  Vital signs:  Temp: 98.3  F (36.8  C) Temp src: Temporal BP: 93/65 Pulse: 118 Heart Rate: 118 Resp: 18 SpO2: " "94 % O2 Device: None (Room air)   Height: 172.7 cm (5' 8\") Weight: 81.3 kg (179 lb 4.8 oz)  Estimated body mass index is 27.26 kg/m  as calculated from the following:    Height as of this encounter: 1.727 m (5' 8\").    Weight as of this encounter: 81.3 kg (179 lb 4.8 oz).        Physical Exam  Constitutional:       Appearance: Normal appearance.   HENT:      Head: Normocephalic and atraumatic.      Mouth/Throat:      Mouth: Mucous membranes are moist.   Cardiovascular:      Rate and Rhythm: Normal rate and regular rhythm.      Heart sounds: No murmur. No friction rub. No gallop.    Abdominal:      General: Abdomen is flat. Bowel sounds are normal. There is no distension.      Palpations: There is no mass.      Tenderness: There is abdominal tenderness. There is guarding.   Skin:     Coloration: Skin is not jaundiced.   Neurological:      General: No focal deficit present.      Mental Status: She is alert and oriented to person, place, and time.      Cranial Nerves: No cranial nerve deficit.      Sensory: No sensory deficit.      Motor: No weakness.   Psychiatric:         Mood and Affect: Mood normal.         Behavior: Behavior normal.           Labs  Lab Results   Component Value Date    WBC 16.2 (H) 07/26/2020    HGB 7.6 (L) 07/26/2020    HCT 25.5 (L) 07/26/2020     07/26/2020     07/26/2020    POTASSIUM 3.0 (L) 07/26/2020    CHLORIDE 109 07/26/2020    CO2 25 07/26/2020    BUN 4 (L) 07/26/2020    CR 0.78 07/26/2020    GLC 82 07/26/2020    SED 27 07/20/2020    DD 1.2 (H) 07/22/2020    NTBNPI 412 01/30/2020    NTBNP 184 (H) 01/17/2020    TROPI <0.015 07/26/2020    AST 31 07/26/2020    ALT 22 07/26/2020    ALKPHOS 175 (H) 07/26/2020    BILITOTAL 0.3 07/26/2020    INR 1.20 (H) 07/20/2020         Imaging   Results for orders placed or performed during the hospital encounter of 07/20/20   CT Abdomen Pelvis w Contrast    Impression    IMPRESSION:   1. Thickening and enhancement of the bile duct proximal to " the  choledochojejunostomy with surrounding inflammatory change, slightly  increased, as can be seen with cholangitis.  2. Fluid with mild adjacent peritoneal thickening and enhancement in  the pelvis posterior to the uterus. This could represent small volume  ascites or a subtle sign of peritonitis.  3. Subtle thickening and enhancement of portions of the jejunum,  sigmoid colon, and proximal transverse colon, this may be secondary to  lack of full distention however or mild enterocolitis.  2. Stable soft tissue nodularity along the right abdominal wall and  right inguinal region, this is favored to be secondary to fat necrosis  from prior fluid collections presence but lack of activity on PET/CT.  3. Decreased main pancreatic ductal dilation.  4. Subtle hazy appearance of the mesentery, this is presumed to be  reactive in nature.  5. Mild left lower lobe groundglass opacity, nonspecific, infection  versus atelectasis.    I have personally reviewed the examination and initial interpretation  and I agree with the findings.    MD Julien ORTEGA MD on 7/21/2020 at 12:41 PM    Time Spent: 25+ minutes

## 2020-07-27 ENCOUNTER — ANESTHESIA (OUTPATIENT)
Dept: SURGERY | Facility: CLINIC | Age: 55
DRG: 445 | End: 2020-07-27
Payer: MEDICARE

## 2020-07-27 ENCOUNTER — APPOINTMENT (OUTPATIENT)
Dept: GENERAL RADIOLOGY | Facility: CLINIC | Age: 55
DRG: 445 | End: 2020-07-27
Attending: INTERNAL MEDICINE
Payer: MEDICARE

## 2020-07-27 LAB
ANION GAP SERPL CALCULATED.3IONS-SCNC: 4 MMOL/L (ref 3–14)
BASOPHILS # BLD AUTO: 0.1 10E9/L (ref 0–0.2)
BASOPHILS NFR BLD AUTO: 0.4 %
BUN SERPL-MCNC: 3 MG/DL (ref 7–30)
CALCIUM SERPL-MCNC: 7.7 MG/DL (ref 8.5–10.1)
CHLORIDE SERPL-SCNC: 113 MMOL/L (ref 94–109)
CO2 SERPL-SCNC: 24 MMOL/L (ref 20–32)
CREAT SERPL-MCNC: 0.84 MG/DL (ref 0.52–1.04)
DIFFERENTIAL METHOD BLD: ABNORMAL
EOSINOPHIL # BLD AUTO: 0.1 10E9/L (ref 0–0.7)
EOSINOPHIL NFR BLD AUTO: 0.6 %
ERCP: NORMAL
ERYTHROCYTE [DISTWIDTH] IN BLOOD BY AUTOMATED COUNT: 24.2 % (ref 10–15)
GFR SERPL CREATININE-BSD FRML MDRD: 78 ML/MIN/{1.73_M2}
GLUCOSE BLDC GLUCOMTR-MCNC: 82 MG/DL (ref 70–99)
GLUCOSE SERPL-MCNC: 83 MG/DL (ref 70–99)
HCT VFR BLD AUTO: 26.6 % (ref 35–47)
HGB BLD-MCNC: 8 G/DL (ref 11.7–15.7)
IMM GRANULOCYTES # BLD: 1 10E9/L (ref 0–0.4)
IMM GRANULOCYTES NFR BLD: 4.5 %
INR PPP: 1.32 (ref 0.86–1.14)
LABORATORY COMMENT REPORT: NORMAL
LACTATE BLD-SCNC: 0.6 MMOL/L (ref 0.7–2)
LYMPHOCYTES # BLD AUTO: 2.9 10E9/L (ref 0.8–5.3)
LYMPHOCYTES NFR BLD AUTO: 13 %
MCH RBC QN AUTO: 27 PG (ref 26.5–33)
MCHC RBC AUTO-ENTMCNC: 30.1 G/DL (ref 31.5–36.5)
MCV RBC AUTO: 90 FL (ref 78–100)
MONOCYTES # BLD AUTO: 1.9 10E9/L (ref 0–1.3)
MONOCYTES NFR BLD AUTO: 8.4 %
NEUTROPHILS # BLD AUTO: 16.1 10E9/L (ref 1.6–8.3)
NEUTROPHILS NFR BLD AUTO: 73.1 %
NRBC # BLD AUTO: 0.1 10*3/UL
NRBC BLD AUTO-RTO: 0 /100
PLATELET # BLD AUTO: 225 10E9/L (ref 150–450)
POTASSIUM SERPL-SCNC: 3.3 MMOL/L (ref 3.4–5.3)
POTASSIUM SERPL-SCNC: 3.3 MMOL/L (ref 3.4–5.3)
POTASSIUM SERPL-SCNC: 4 MMOL/L (ref 3.4–5.3)
RBC # BLD AUTO: 2.96 10E12/L (ref 3.8–5.2)
SARS-COV-2 RNA SPEC QL NAA+PROBE: NEGATIVE
SARS-COV-2 RNA SPEC QL NAA+PROBE: NORMAL
SODIUM SERPL-SCNC: 140 MMOL/L (ref 133–144)
SPECIMEN SOURCE: NORMAL
SPECIMEN SOURCE: NORMAL
WBC # BLD AUTO: 22 10E9/L (ref 4–11)

## 2020-07-27 PROCEDURE — 37000008 ZZH ANESTHESIA TECHNICAL FEE, 1ST 30 MIN: Performed by: INTERNAL MEDICINE

## 2020-07-27 PROCEDURE — 71000014 ZZH RECOVERY PHASE 1 LEVEL 2 FIRST HR: Performed by: INTERNAL MEDICINE

## 2020-07-27 PROCEDURE — 84132 ASSAY OF SERUM POTASSIUM: CPT | Performed by: INTERNAL MEDICINE

## 2020-07-27 PROCEDURE — 0F798ZZ DILATION OF COMMON BILE DUCT, VIA NATURAL OR ARTIFICIAL OPENING ENDOSCOPIC: ICD-10-PCS | Performed by: INTERNAL MEDICINE

## 2020-07-27 PROCEDURE — 25000128 H RX IP 250 OP 636: Performed by: INTERNAL MEDICINE

## 2020-07-27 PROCEDURE — 99232 SBSQ HOSP IP/OBS MODERATE 35: CPT | Performed by: INTERNAL MEDICINE

## 2020-07-27 PROCEDURE — C1726 CATH, BAL DIL, NON-VASCULAR: HCPCS | Performed by: INTERNAL MEDICINE

## 2020-07-27 PROCEDURE — 00000146 ZZHCL STATISTIC GLUCOSE BY METER IP

## 2020-07-27 PROCEDURE — 25000125 ZZHC RX 250: Performed by: INTERNAL MEDICINE

## 2020-07-27 PROCEDURE — 25500064 ZZH RX 255 OP 636: Performed by: INTERNAL MEDICINE

## 2020-07-27 PROCEDURE — 88305 TISSUE EXAM BY PATHOLOGIST: CPT | Performed by: INTERNAL MEDICINE

## 2020-07-27 PROCEDURE — 85025 COMPLETE CBC W/AUTO DIFF WBC: CPT | Performed by: INTERNAL MEDICINE

## 2020-07-27 PROCEDURE — 0DB98ZX EXCISION OF DUODENUM, VIA NATURAL OR ARTIFICIAL OPENING ENDOSCOPIC, DIAGNOSTIC: ICD-10-PCS | Performed by: INTERNAL MEDICINE

## 2020-07-27 PROCEDURE — 36000061 ZZH SURGERY LEVEL 3 W FLUORO 1ST 30 MIN - UMMC: Performed by: INTERNAL MEDICINE

## 2020-07-27 PROCEDURE — 80048 BASIC METABOLIC PNL TOTAL CA: CPT | Performed by: INTERNAL MEDICINE

## 2020-07-27 PROCEDURE — 85610 PROTHROMBIN TIME: CPT | Performed by: INTERNAL MEDICINE

## 2020-07-27 PROCEDURE — 25000132 ZZH RX MED GY IP 250 OP 250 PS 637: Mod: GY | Performed by: PHYSICIAN ASSISTANT

## 2020-07-27 PROCEDURE — C1769 GUIDE WIRE: HCPCS | Performed by: INTERNAL MEDICINE

## 2020-07-27 PROCEDURE — 27210794 ZZH OR GENERAL SUPPLY STERILE: Performed by: INTERNAL MEDICINE

## 2020-07-27 PROCEDURE — 37000009 ZZH ANESTHESIA TECHNICAL FEE, EACH ADDTL 15 MIN: Performed by: INTERNAL MEDICINE

## 2020-07-27 PROCEDURE — 25000566 ZZH SEVOFLURANE, EA 15 MIN: Performed by: INTERNAL MEDICINE

## 2020-07-27 PROCEDURE — 40000278 XR SURGERY CARM FLUORO LESS THAN 5 MIN: Mod: TC

## 2020-07-27 PROCEDURE — 25000128 H RX IP 250 OP 636: Performed by: PHYSICIAN ASSISTANT

## 2020-07-27 PROCEDURE — 36000059 ZZH SURGERY LEVEL 3 EA 15 ADDTL MIN UMMC: Performed by: INTERNAL MEDICINE

## 2020-07-27 PROCEDURE — 25800030 ZZH RX IP 258 OP 636: Performed by: PHYSICIAN ASSISTANT

## 2020-07-27 PROCEDURE — 83605 ASSAY OF LACTIC ACID: CPT | Performed by: INTERNAL MEDICINE

## 2020-07-27 PROCEDURE — 25800030 ZZH RX IP 258 OP 636: Performed by: INTERNAL MEDICINE

## 2020-07-27 PROCEDURE — 12000012 ZZH R&B MS OVERFLOW UMMC

## 2020-07-27 PROCEDURE — 40000170 ZZH STATISTIC PRE-PROCEDURE ASSESSMENT II: Performed by: INTERNAL MEDICINE

## 2020-07-27 PROCEDURE — 25000132 ZZH RX MED GY IP 250 OP 250 PS 637: Mod: GY | Performed by: INTERNAL MEDICINE

## 2020-07-27 RX ORDER — HYDROMORPHONE HYDROCHLORIDE 1 MG/ML
.3-.5 INJECTION, SOLUTION INTRAMUSCULAR; INTRAVENOUS; SUBCUTANEOUS EVERY 5 MIN PRN
Status: DISCONTINUED | OUTPATIENT
Start: 2020-07-27 | End: 2020-07-27 | Stop reason: HOSPADM

## 2020-07-27 RX ORDER — FENTANYL CITRATE 50 UG/ML
INJECTION, SOLUTION INTRAMUSCULAR; INTRAVENOUS PRN
Status: DISCONTINUED | OUTPATIENT
Start: 2020-07-27 | End: 2020-07-27

## 2020-07-27 RX ORDER — DEXAMETHASONE SODIUM PHOSPHATE 4 MG/ML
INJECTION, SOLUTION INTRA-ARTICULAR; INTRALESIONAL; INTRAMUSCULAR; INTRAVENOUS; SOFT TISSUE PRN
Status: DISCONTINUED | OUTPATIENT
Start: 2020-07-27 | End: 2020-07-27

## 2020-07-27 RX ORDER — ESMOLOL HYDROCHLORIDE 10 MG/ML
INJECTION INTRAVENOUS PRN
Status: DISCONTINUED | OUTPATIENT
Start: 2020-07-27 | End: 2020-07-27

## 2020-07-27 RX ORDER — IOPAMIDOL 510 MG/ML
INJECTION, SOLUTION INTRAVASCULAR PRN
Status: DISCONTINUED | OUTPATIENT
Start: 2020-07-27 | End: 2020-07-27

## 2020-07-27 RX ORDER — PROPOFOL 10 MG/ML
INJECTION, EMULSION INTRAVENOUS PRN
Status: DISCONTINUED | OUTPATIENT
Start: 2020-07-27 | End: 2020-07-27

## 2020-07-27 RX ORDER — INDOMETHACIN 50 MG/1
100 SUPPOSITORY RECTAL
Status: DISCONTINUED | OUTPATIENT
Start: 2020-07-27 | End: 2020-07-27 | Stop reason: HOSPADM

## 2020-07-27 RX ORDER — LIDOCAINE HYDROCHLORIDE 20 MG/ML
INJECTION, SOLUTION INFILTRATION; PERINEURAL PRN
Status: DISCONTINUED | OUTPATIENT
Start: 2020-07-27 | End: 2020-07-27

## 2020-07-27 RX ORDER — SODIUM CHLORIDE, SODIUM LACTATE, POTASSIUM CHLORIDE, CALCIUM CHLORIDE 600; 310; 30; 20 MG/100ML; MG/100ML; MG/100ML; MG/100ML
INJECTION, SOLUTION INTRAVENOUS CONTINUOUS
Status: DISCONTINUED | OUTPATIENT
Start: 2020-07-27 | End: 2020-07-27 | Stop reason: HOSPADM

## 2020-07-27 RX ORDER — NALOXONE HYDROCHLORIDE 0.4 MG/ML
.1-.4 INJECTION, SOLUTION INTRAMUSCULAR; INTRAVENOUS; SUBCUTANEOUS
Status: DISCONTINUED | OUTPATIENT
Start: 2020-07-27 | End: 2020-07-27

## 2020-07-27 RX ORDER — LABETALOL 20 MG/4 ML (5 MG/ML) INTRAVENOUS SYRINGE
5-10
Status: DISCONTINUED | OUTPATIENT
Start: 2020-07-27 | End: 2020-07-27 | Stop reason: HOSPADM

## 2020-07-27 RX ORDER — FENTANYL CITRATE 50 UG/ML
25-50 INJECTION, SOLUTION INTRAMUSCULAR; INTRAVENOUS
Status: DISCONTINUED | OUTPATIENT
Start: 2020-07-27 | End: 2020-07-27 | Stop reason: HOSPADM

## 2020-07-27 RX ORDER — ONDANSETRON 2 MG/ML
4 INJECTION INTRAMUSCULAR; INTRAVENOUS EVERY 30 MIN PRN
Status: DISCONTINUED | OUTPATIENT
Start: 2020-07-27 | End: 2020-07-27 | Stop reason: HOSPADM

## 2020-07-27 RX ORDER — ONDANSETRON 4 MG/1
4 TABLET, ORALLY DISINTEGRATING ORAL EVERY 30 MIN PRN
Status: DISCONTINUED | OUTPATIENT
Start: 2020-07-27 | End: 2020-07-27 | Stop reason: HOSPADM

## 2020-07-27 RX ORDER — LIDOCAINE 40 MG/G
CREAM TOPICAL
Status: DISCONTINUED | OUTPATIENT
Start: 2020-07-27 | End: 2020-07-27 | Stop reason: HOSPADM

## 2020-07-27 RX ORDER — SODIUM CHLORIDE, SODIUM LACTATE, POTASSIUM CHLORIDE, CALCIUM CHLORIDE 600; 310; 30; 20 MG/100ML; MG/100ML; MG/100ML; MG/100ML
INJECTION, SOLUTION INTRAVENOUS CONTINUOUS PRN
Status: DISCONTINUED | OUTPATIENT
Start: 2020-07-27 | End: 2020-07-27

## 2020-07-27 RX ADMIN — PIPERACILLIN AND TAZOBACTAM 3.38 G: 3; .375 INJECTION, POWDER, FOR SOLUTION INTRAVENOUS at 14:35

## 2020-07-27 RX ADMIN — HYDROMORPHONE HYDROCHLORIDE 1 MG: 1 INJECTION, SOLUTION INTRAMUSCULAR; INTRAVENOUS; SUBCUTANEOUS at 10:38

## 2020-07-27 RX ADMIN — POTASSIUM CHLORIDE 20 MEQ: 750 TABLET, EXTENDED RELEASE ORAL at 06:01

## 2020-07-27 RX ADMIN — HYDROMORPHONE HYDROCHLORIDE 1 MG: 1 INJECTION, SOLUTION INTRAMUSCULAR; INTRAVENOUS; SUBCUTANEOUS at 14:43

## 2020-07-27 RX ADMIN — LIDOCAINE HYDROCHLORIDE 100 MG: 20 INJECTION, SOLUTION INFILTRATION; PERINEURAL at 11:30

## 2020-07-27 RX ADMIN — ONDANSETRON 4 MG: 4 TABLET, ORALLY DISINTEGRATING ORAL at 09:10

## 2020-07-27 RX ADMIN — ONDANSETRON 4 MG: 4 TABLET, ORALLY DISINTEGRATING ORAL at 17:18

## 2020-07-27 RX ADMIN — Medication 100 MCG: at 11:32

## 2020-07-27 RX ADMIN — HYDROMORPHONE HYDROCHLORIDE 4 MG: 2 TABLET ORAL at 06:00

## 2020-07-27 RX ADMIN — HYDROMORPHONE HYDROCHLORIDE 1 MG: 1 INJECTION, SOLUTION INTRAMUSCULAR; INTRAVENOUS; SUBCUTANEOUS at 22:48

## 2020-07-27 RX ADMIN — HYDROMORPHONE HYDROCHLORIDE 4 MG: 2 TABLET ORAL at 00:28

## 2020-07-27 RX ADMIN — ONDANSETRON 4 MG: 4 TABLET, ORALLY DISINTEGRATING ORAL at 03:17

## 2020-07-27 RX ADMIN — FAMOTIDINE 20 MG: 20 TABLET ORAL at 08:56

## 2020-07-27 RX ADMIN — Medication 50 MG: at 11:32

## 2020-07-27 RX ADMIN — HYDROMORPHONE HYDROCHLORIDE 4 MG: 2 TABLET ORAL at 20:51

## 2020-07-27 RX ADMIN — MULTIPLE VITAMINS W/ MINERALS TAB 1 TABLET: TAB at 08:56

## 2020-07-27 RX ADMIN — FAMOTIDINE 20 MG: 20 TABLET ORAL at 20:08

## 2020-07-27 RX ADMIN — POTASSIUM CHLORIDE 20 MEQ: 29.8 INJECTION, SOLUTION INTRAVENOUS at 15:37

## 2020-07-27 RX ADMIN — FENTANYL CITRATE 100 MCG: 50 INJECTION, SOLUTION INTRAMUSCULAR; INTRAVENOUS at 11:30

## 2020-07-27 RX ADMIN — Medication 100 MCG: at 11:30

## 2020-07-27 RX ADMIN — POTASSIUM CHLORIDE 40 MEQ: 750 TABLET, EXTENDED RELEASE ORAL at 04:33

## 2020-07-27 RX ADMIN — PANTOPRAZOLE SODIUM 40 MG: 40 TABLET, DELAYED RELEASE ORAL at 20:08

## 2020-07-27 RX ADMIN — HYDROMORPHONE HYDROCHLORIDE 4 MG: 2 TABLET ORAL at 08:59

## 2020-07-27 RX ADMIN — SODIUM CHLORIDE: 9 INJECTION, SOLUTION INTRAVENOUS at 00:29

## 2020-07-27 RX ADMIN — ESMOLOL HYDROCHLORIDE 20 MG: 10 INJECTION INTRAVENOUS at 11:50

## 2020-07-27 RX ADMIN — DEXAMETHASONE SODIUM PHOSPHATE 8 MG: 4 INJECTION, SOLUTION INTRA-ARTICULAR; INTRALESIONAL; INTRAMUSCULAR; INTRAVENOUS; SOFT TISSUE at 11:40

## 2020-07-27 RX ADMIN — SODIUM CHLORIDE: 9 INJECTION, SOLUTION INTRAVENOUS at 13:11

## 2020-07-27 RX ADMIN — SIMETHICONE 80 MG: 80 TABLET, CHEWABLE ORAL at 17:18

## 2020-07-27 RX ADMIN — HYDROMORPHONE HYDROCHLORIDE 4 MG: 2 TABLET ORAL at 15:53

## 2020-07-27 RX ADMIN — HYDROMORPHONE HYDROCHLORIDE 4 MG: 2 TABLET ORAL at 03:17

## 2020-07-27 RX ADMIN — CITALOPRAM HYDROBROMIDE 20 MG: 20 TABLET, FILM COATED ORAL at 08:57

## 2020-07-27 RX ADMIN — HYDROMORPHONE HYDROCHLORIDE 1 MG: 1 INJECTION, SOLUTION INTRAMUSCULAR; INTRAVENOUS; SUBCUTANEOUS at 18:48

## 2020-07-27 RX ADMIN — SIMETHICONE 80 MG: 80 TABLET, CHEWABLE ORAL at 09:10

## 2020-07-27 RX ADMIN — Medication 200 MCG: at 11:46

## 2020-07-27 RX ADMIN — PANTOPRAZOLE SODIUM 40 MG: 40 TABLET, DELAYED RELEASE ORAL at 08:57

## 2020-07-27 RX ADMIN — SIMETHICONE 80 MG: 80 TABLET, CHEWABLE ORAL at 03:17

## 2020-07-27 RX ADMIN — PIPERACILLIN AND TAZOBACTAM 3.38 G: 3; .375 INJECTION, POWDER, FOR SOLUTION INTRAVENOUS at 03:17

## 2020-07-27 RX ADMIN — POTASSIUM CHLORIDE 20 MEQ: 29.8 INJECTION, SOLUTION INTRAVENOUS at 17:10

## 2020-07-27 RX ADMIN — SODIUM CHLORIDE, SODIUM LACTATE, POTASSIUM CHLORIDE, CALCIUM CHLORIDE: 600; 310; 30; 20 INJECTION, SOLUTION INTRAVENOUS at 11:15

## 2020-07-27 RX ADMIN — PROPOFOL 100 MG: 10 INJECTION, EMULSION INTRAVENOUS at 11:31

## 2020-07-27 RX ADMIN — ONDANSETRON 4 MG: 2 INJECTION INTRAMUSCULAR; INTRAVENOUS at 11:40

## 2020-07-27 RX ADMIN — PIPERACILLIN AND TAZOBACTAM 3.38 G: 3; .375 INJECTION, POWDER, FOR SOLUTION INTRAVENOUS at 08:57

## 2020-07-27 RX ADMIN — AMITRIPTYLINE HYDROCHLORIDE 75 MG: 75 TABLET, FILM COATED ORAL at 21:53

## 2020-07-27 RX ADMIN — PIPERACILLIN AND TAZOBACTAM 3.38 G: 3; .375 INJECTION, POWDER, FOR SOLUTION INTRAVENOUS at 21:53

## 2020-07-27 ASSESSMENT — PAIN DESCRIPTION - DESCRIPTORS
DESCRIPTORS: ACHING;SHARP
DESCRIPTORS: ACHING;CONSTANT;SHARP
DESCRIPTORS: ACHING;SHARP
DESCRIPTORS: ACHING;CONSTANT;SHARP
DESCRIPTORS: ACHING;SHARP
DESCRIPTORS: ACHING

## 2020-07-27 ASSESSMENT — ACTIVITIES OF DAILY LIVING (ADL)
ADLS_ACUITY_SCORE: 12

## 2020-07-27 ASSESSMENT — MIFFLIN-ST. JEOR: SCORE: 1489.93

## 2020-07-27 NOTE — ANESTHESIA POSTPROCEDURE EVALUATION
Anesthesia POST Procedure Evaluation    Patient: Nathalie Bashir   MRN:     [id number] Gender:   female   Age:    54 year old :      1965        Preoperative Diagnosis: Cholangitis [K83.09]   Procedure(s):  ENDOSCOPIC RETROGRADE CHOLANGIOPANCREATOGRAPHY  **Latex Allergy** with jejunal biopsies   Postop Comments: No value filed.     Anesthesia Type: General       Disposition: Admission   Postop Pain Control: Uneventful            Sign Out: Well controlled pain   PONV: No   Neuro/Psych: Uneventful            Sign Out: Acceptable/Baseline neuro status   Airway/Respiratory: Uneventful            Sign Out: Acceptable/Baseline resp. status   CV/Hemodynamics: Uneventful            Sign Out: Acceptable CV status   Other NRE: NONE   DID A NON-ROUTINE EVENT OCCUR? No    Event details/Postop Comments:  Awake, comfortable; satisfactory recovery from GA.    Juan Manuel Alaniz MD  [phone]               Last Anesthesia Record Vitals:  CRNA VITALS  2020 1144 - 2020 1244      2020             Pulse:  116    Ht Rate:  113    SpO2:  100 %    Resp Rate (observed):  (!) 3          Last PACU Vitals:  Vitals Value Taken Time   BP 95/60 2020  1:15 PM   Temp 36.7  C (98.1  F) 2020  1:52 PM   Pulse 99 2020  1:30 PM   Resp 12 2020  1:30 PM   SpO2 95 % 2020  1:30 PM   Temp src     NIBP     Pulse     SpO2     Resp     Temp     Ht Rate     Temp 2     Vitals shown include unvalidated device data.      Electronically Signed By: Juan Manuel Alaniz MD, 2020, 1:56 PM

## 2020-07-27 NOTE — OP NOTE
ERCP  07/27/2020 11:12 AM  [facility], [address] [place] [address] [phone]     Endoscopy Department   _______________________________________________________________________________   Patient Name: Nathalie Bashir           Procedure Date: 7/27/2020 11:12 AM   MRN: [id number]                       Account Number: [id number]   YOB: 1965             Admit Type: Inpatient   Age: 54                               Room: OR   Gender: Female                        Note Status: Finalized   Attending MD: Jeet Aviles MD   Total Sedation Time:   _______________________________________________________________________________       Procedure:           ERCP   Indications:         Epigastric abdominal pain, Suspected ascending                        cholangitis   Providers:           Jeet Aviles MD   Patient Profile:     Ms Bashir is a 54 year old female with a history                        ofÂ poorly differentiated gastric adenocarcinoma on                        chemotherapy discovered after Whipple procedure in                        January of this year (done for pancreatic cyst) who                        presented to the emergency department with worsening                        right upper quadrant abdominal pain. CT scan of the                        abdomen and pelvis obtained revealed thickening and                        enhancement of the bile duct proximal to the                        choledochojejunostomy with surrounding inflammation                        concerning for cholangitis. Â Her liver tests were mildly                        abnormal including alkaline phosphatase that is                        increased from her baseline. Â Her white blood cell count                        was 25,000 on admission but did just receive Neulasta 4                        days prior to admission. With continued symptoms she now                         proceeds to enterosocpy ERCP for evaluation and possible                        management.   Referring MD:        Yandel Mallory   Medicines:           General Anesthesia, Antibiotics as scheduled   Complications:       No immediate complications.   _______________________________________________________________________________   Procedure:           Pre-Anesthesia Assessment:                        - Prior to the procedure, a History and Physical was                        performed, and patient medications and allergies were                        reviewed. The patient is competent. The risks and                        benefits of the procedure and the sedation options and                        risks were discussed with the patient. All questions                        were answered and informed consent was obtained. Patient                        identification and proposed procedure were verified by                        the nurse in the pre-procedure area. Mental Status                        Examination: alert and oriented. Airway Examination:                        Mallampati Class II (the uvula but not tonsillar pillars                        visualized). Respiratory Examination: clear to                        auscultation. CV Examination: normal. ASA Grade                        Assessment: III - A patient with severe systemic                        disease. After reviewing the risks and benefits, the                        patient was deemed in satisfactory condition to undergo                        the procedure. The anesthesia plan was to use general                        anesthesia. Immediately prior to administration of                        medications, the patient was re-assessed for adequacy to                        receive sedatives. The heart rate, respiratory rate,                        oxygen saturations, blood pressure, adequacy of                        pulmonary ventilation, and  "response to care were                        monitored throughout the procedure. The physical status                        of the patient was re-assessed after the procedure.                        After obtaining informed consent, the scope was passed                        under direct vision. Throughout the procedure, the                        patient's blood pressure, pulse, and oxygen saturations                        were monitored continuously. The 1T gastroscope was                        introduced through the mouth, and used to inject                        contrast into and used to inject contrast into the bile                        duct. The ERCP was accomplished without difficulty. The                        patient tolerated the procedure well.                                                                                     Findings:        The patient was supine throughout.  films were unremarkable.        Endoscopically, the esophagus was unremarkable. The stomach demonstrated        an antrectomy with widely patent gastrojejunostomy. Two limbs were found        and both explored, the feeding limb for approximately 60cm and was found        grossly unremarkable. The pancreaticobiliary limb was riddled with        superficial erosions without bleeding and tissue was acquired for        pathology. The hepaticojejunostomy was found was mildly stenotic. The        bile duct was deeply cannulated with the 12 mm balloon in concert with        an 0.025\" Visiglide wire. Contrast was injected and I personally        interpreted the bile duct images. Ductal flow of contrast was adequate,        image quality was excellent and contrast extended to the hepatic ducts.        The lower third of the main bile duct and HJ were mildly stenotic though        cholangioscopy was without suggestion of growth (malignancy). The common        duct upstream of the narrowing was mildly generous, though the       "  bifurcation and the intrahepatics were diffusely decompressed. The        anastomosis and distal duct were then dilated to 6mm with a Hurricaner.        Again there was no evidence of suspicious lesion of the distal duct with        improved views following dilation. Drainage was excellent. The        pancreaticojejunostomy was not explored.                                                                                     Impression:          - No evidence of persisent or recurrent mucosal                        malignancy                        - Healthy appearing traditional Whipple anatomy save for                        diffuse, nonspecific biliary limb small erosions,                        sampled by biopsy                        - Mildly narrowed HJ and distal common duct, though this                        appeared benign by direct cholangioscopy, and was                        dilated to 6mm with excellent effect                        - Otherwise grossly unremarkable cholangiography   Recommendation:      - General anesthesia recovery with return to the floor                        when appropriate                        - No plans for future advanced endoscopy at this juncture                        - Dr Aviles to communicate the results of histology                        when available                        - Convert famotidine to proton pump inhibitor, at least                        once a day for 60 days                        - The findings and recommendations were discussed with                        the patient and their family                                                                                       electronically signed by ANDREY Aviles

## 2020-07-27 NOTE — ANESTHESIA CARE TRANSFER NOTE
Patient: Nathalie Bashir    Procedure(s):  ENDOSCOPIC RETROGRADE CHOLANGIOPANCREATOGRAPHY  **Latex Allergy** with jejunal biopsies    Diagnosis: Cholangitis [K83.09]  Diagnosis Additional Information: No value filed.    Anesthesia Type:   General     Note:  Airway :Nasal Cannula  Patient transferred to:PACU  Comments: Pt extubated in the OR without incident or complications. Pt VSS upon arrival to the PACU. Pt has no c/o pain/N/V. Pt care report given to receiving RN> All questions answered. Handoff Report: Identifed the Patient, Identified the Reponsible Provider, Reviewed the pertinent medical history, Discussed the surgical course, Reviewed Intra-OP anesthesia mangement and issues during anesthesia, Set expectations for post-procedure period and Allowed opportunity for questions and acknowledgement of understanding      Vitals: (Last set prior to Anesthesia Care Transfer)    CRNA VITALS  7/27/2020 1144 - 7/27/2020 1222      7/27/2020             Pulse:  116    Ht Rate:  113    SpO2:  100 %    Resp Rate (observed):  (!) 3                Electronically Signed By: NINA Dawson CRNA  July 27, 2020  12:22 PM

## 2020-07-27 NOTE — PLAN OF CARE
"/59 (BP Location: Left arm)   Pulse 106   Temp 98.4  F (36.9  C) (Oral)   Resp 18   Ht 1.727 m (5' 8\")   Wt 83.6 kg (184 lb 6.4 oz)   LMP 09/23/2014   SpO2 98%   BMI 28.04 kg/m       VSS on RA sating >95%. Adequate UO (50mL/hr), BM x0, n/v managed with prn zofran given x2, gas pain managed with prn simethicone given x2, right abdominal pain managed with prn PO dilaudid given x4 and prn IV dilaudid given x1. NS at 100 mL/hr through port.  Potassium 60 mEQ replaced with recheck ordered for 8:30 this morning, Asymptomatic presurgical COVID test completed and negative, weight completed. ERCP today at 1pm so NPO since midnight. continue with POC.      Problem: Adult Inpatient Plan of Care  Goal: Plan of Care Review  7/27/2020 0447 by Roxana Pop, RN  Outcome: No Change     Problem: Adult Inpatient Plan of Care  Goal: Patient-Specific Goal (Individualization)  7/27/2020 0447 by Roxana Pop, RN  Outcome: No Change     Problem: Adult Inpatient Plan of Care  Goal: Absence of Hospital-Acquired Illness or Injury  Outcome: No Change     Problem: Adult Inpatient Plan of Care  Goal: Readiness for Transition of Care  Outcome: No Change     Problem: Adult Inpatient Plan of Care  Goal: Rounds/Family Conference  Outcome: No Change     Problem: Cardiac Disease Comorbidity  Goal: Cardiac Disease  Description: Patient comorbidity will be monitored for signs and symptoms of Cardiac Disease.  Problems will be absent, minimized or managed by discharge/transition of care.  Outcome: No Change     Problem: Gastrointestinal Condition Comorbidity  Goal: Gastrointestinal Condition  Description: Patient comorbidity will be monitored for signs and symptoms of Gastrointestinal condition.  Problems will be absent, minimized or managed by discharge/transition of care.  Outcome: No Change     Problem: Pain Chronic (Persistent) (Comorbidity Management)  Goal: Acceptable Pain Control and Functional Ability  Outcome: No Change   "   Problem: Adult Inpatient Plan of Care  Goal: Optimal Comfort and Wellbeing  Outcome: Improving     Problem: Pain Acute  Goal: Optimal Pain Control  7/27/2020 0447 by Roxana Pop RN  Outcome: Improving

## 2020-07-27 NOTE — PROGRESS NOTES
"CLINICAL NUTRITION SERVICES - ASSESSMENT NOTE     Nutrition Prescription    RECOMMENDATIONS FOR MDs/PROVIDERS TO ORDER:  Resume diet as able after ERCP    Recommendations already ordered by Registered Dietitian (RD):  None with current NPO    Future/Additional Recommendations:  Rec to have supplements PRN vs boost plus daily when diet advanced to encourage po and to provide supplement that is similar to the CIB that pt does at home.  Rec small/frequent po if better tolerated.     Continue f/u with OP RD as pt is interested     REASON FOR ASSESSMENT  Nathalie Bashir is a/an 54 year old female assessed by the dietitian for [facility] significant for:  poorly differentiated gastric adenocarcinoma , pancreatic cyst status post Whipple procedure (1/28/20), cholecystectomy, GERD  --admitted with acute abdominal pain    NUTRITION HISTORY  Pt off of unit to pre-op area. Unable to call pt and obtain nutrition history at this time.  Per H&P, abdominal pain had a 1 day duration PTA. Pt seen by OP RD on 7/20 at which time she had endorses decreased po d/t abdominal pain. She had reported eating all types of foods and textures at this time and that pain was not necessarily precipitated by po but rather being present frequently. She had been taking CIB 3-4x/week.    CURRENT NUTRITION ORDERS  Diet: NPO  --Was on regular diet from 7/21-7/24; 7/24-7/27  Intake/Tolerance: food records indicating % at meals    LABS  Labs reviewed  K+ 3.3L, Cl 113H, remaining lytes WNl  Euglycemia  UA negative for ketones on 7/20 (Admission)    MEDICATIONS  Medications reviewed  thera-vit-M  IVF: NS @ 100mL/hr    ANTHROPOMETRICS  Height: 172.7 cm (5' 8\")  Most Recent Weight: 83.6 kg (184 lb 6.4 oz)    IBW: 63.6 kg  BMI: 28 --  Overweight BMI 25-29.9  Weight History: Admit wt 77.5 kg (7/21); 6% wt loss x 3 months PTA  Wt Readings from Last 15 Encounters:   07/27/20 83.6 kg (184 lb 6.4 oz)   07/15/20 79.4 kg (175 lb)   07/08/20 75.7 kg (166 lb 14.4 " oz)   07/08/20 75.7 kg (166 lb 14.4 oz)   06/25/20 79 kg (174 lb 1.6 oz)   06/24/20 78.9 kg (174 lb)   06/24/20 79 kg (174 lb 1.6 oz)   06/17/20 81 kg (178 lb 9.6 oz)   06/10/20 79.7 kg (175 lb 12.8 oz)   06/10/20 81.6 kg (180 lb)   05/27/20 83.6 kg (184 lb 3.2 oz)   05/23/20 83.7 kg (184 lb 9.6 oz)   05/18/20 80.5 kg (177 lb 6.4 oz)   05/04/20 81.8 kg (180 lb 4.8 oz)   04/20/20 82.4 kg (181 lb 9.6 oz)     Dosing Weight: 67 kg (adjusted, 77.5 kg on 7/21 and IBW of 63.6 kg)    ASSESSED NUTRITION NEEDS  Estimated Energy Needs: 8502-6765 kcals/day (30 - 35 kcals/kg )  Justification: Repletion  Estimated Protein Needs:  grams protein/day (1.2 - 1.5 grams of pro/kg)  Justification: Repletion  Estimated Fluid Needs:  (1 mL/kcal)   Justification: Maintenance and Per provider pending fluid status    PHYSICAL FINDINGS  See malnutrition section below.  Unable to obtain in-person nutrition history or nutrition focused physical assessment (NFPA) from patient due to telephone visit during covid-19 pandemic.    MALNUTRITION  % Intake: Unable to assess  % Weight Loss: Up to 7.5% in 3 months (non-severe)  Subcutaneous Fat Loss: Unable to assess  Muscle Loss: Unable to assess  Fluid Accumulation/Edema: None noted in flowsheets  Malnutrition Diagnosis: Unable to determine at this time    NUTRITION DIAGNOSIS  Unintended weight loss related to decreased po with abdominal pain and likely role of hypermetabolism with gastric adenocarcinoma as evidenced by chart review, pt with 6% wt loss over past 3 months      INTERVENTIONS  Implementation  Nutrition Education: Unable to complete due to pt off of unit at this time     Goals  Diet adv v nutrition support within 2-3 days.     Monitoring/Evaluation  Progress toward goals will be monitored and evaluated per protocol.    Tessie Joshi MS, RD, , LD.  5C/BMT Pager:[phone]

## 2020-07-27 NOTE — PLAN OF CARE
Patient tachycardic. Afebrile. Up ad nelida. ERCP today with jejenum biopsies and dilation. Patient on regular diet. Dilaudid PO x2. IV dilaudid given x2. Potassium recheck this AM 3.3. 40mEq of potassium replaced. Potassium re-check due at 2000. Possible discharge tomorrow? Continue to monitor.       Problem: Adult Inpatient Plan of Care  Goal: Plan of Care Review  7/27/2020 1649 by Julieta Espinal RN  Outcome: No Change     Problem: Adult Inpatient Plan of Care  Goal: Patient-Specific Goal (Individualization)  7/27/2020 1649 by Julieta Espinal RN  Outcome: No Change     Problem: Adult Inpatient Plan of Care  Goal: Absence of Hospital-Acquired Illness or Injury  7/27/2020 1649 by Julieta Espinal RN  Outcome: No Change     Problem: Adult Inpatient Plan of Care  Goal: Optimal Comfort and Wellbeing  7/27/2020 1649 by Julieta Espinal RN  Outcome: No Change     Problem: Adult Inpatient Plan of Care  Goal: Readiness for Transition of Care  7/27/2020 1649 by Julieta Espinal RN  Outcome: No Change     Problem: Adult Inpatient Plan of Care  Goal: Rounds/Family Conference  7/27/2020 1649 by Julieta Espinal RN  Outcome: No Change     Problem: Cardiac Disease Comorbidity  Goal: Cardiac Disease  Description: Patient comorbidity will be monitored for signs and symptoms of Cardiac Disease.  Problems will be absent, minimized or managed by discharge/transition of care.  7/27/2020 1649 by Julieta Espinal RN  Outcome: No Change     Problem: Gastrointestinal Condition Comorbidity  Goal: Gastrointestinal Condition  Description: Patient comorbidity will be monitored for signs and symptoms of Gastrointestinal condition.  Problems will be absent, minimized or managed by discharge/transition of care.  7/27/2020 1649 by Julieta Espinal RN  Outcome: No Change     Problem: Pain Chronic (Persistent) (Comorbidity Management)  Goal: Acceptable Pain Control and Functional Ability  7/27/2020 1649 by Julieta Espinal RN  Outcome: No  Change     Problem: Pain Acute  Goal: Optimal Pain Control  7/27/2020 1649 by Julieta Espinal, RN  Outcome: No Change

## 2020-07-27 NOTE — ANESTHESIA PREPROCEDURE EVALUATION
Anesthesia Pre-Procedure Evaluation    Patient: Nathalie Bashir   MRN:     [id number] Gender:   female   Age:    54 year old :      1965        Preoperative Diagnosis: Cholangitis [K83.09]   Procedure(s):  ENDOSCOPIC RETROGRADE CHOLANGIOPANCREATOGRAPHY  **Latex Allergy**     LABS:  CBC:   Lab Results   Component Value Date    WBC 16.2 (H) 2020    WBC 15.8 (H) 2020    HGB 7.6 (L) 2020    HGB 7.8 (L) 2020    HCT 25.5 (L) 2020    HCT 26.0 (L) 2020     2020     2020     BMP:   Lab Results   Component Value Date     2020     2020    POTASSIUM 3.4 2020    POTASSIUM 3.0 (L) 2020    CHLORIDE 109 2020    CHLORIDE 107 2020    CO2 25 2020    CO2 28 2020    BUN 4 (L) 2020    BUN 3 (L) 2020    CR 0.78 2020    CR 0.86 2020    GLC 82 2020    GLC 86 2020     COAGS:   Lab Results   Component Value Date    INR 1.20 (H) 2020    FIBR 348 2020     POC:   Lab Results   Component Value Date     (H) 2020    HCG Negative 2020     OTHER:   Lab Results   Component Value Date    LACT 1.5 2020    PETRA 7.6 (L) 2020    PHOS 3.7 2020    MAG 1.6 2020    ALBUMIN 1.8 (L) 2020    PROTTOTAL 4.4 (L) 2020    ALT 22 2020    AST 31 2020    ALKPHOS 175 (H) 2020    BILITOTAL 0.3 2020    LIPASE 31 (L) 2020    AMYLASE 13 (L) 2020    TSH 4.16 (H) 2020    T4 1.38 2020    CRP 11.0 (H) 2020    SED 27 2020        Preop Vitals    BP Readings from Last 3 Encounters:   20 109/84   20 99/65   07/15/20 120/63    Pulse Readings from Last 3 Encounters:   20 112   20 107   07/15/20 103      Resp Readings from Last 3 Encounters:   20 18   20 16   07/15/20 16    SpO2 Readings from Last 3 Encounters:   20 99%   20 99%   07/15/20 98%      Temp  "Readings from Last 1 Encounters:   07/26/20 36.3  C (97.3  F) (Temporal)    Ht Readings from Last 1 Encounters:   07/20/20 1.727 m (5' 8\")      Wt Readings from Last 1 Encounters:   07/26/20 81.3 kg (179 lb 4.8 oz)    Estimated body mass index is 27.26 kg/m  as calculated from the following:    Height as of this encounter: 1.727 m (5' 8\").    Weight as of this encounter: 81.3 kg (179 lb 4.8 oz).     LDA:  Port A Cath Single 03/26/20 Right Chest wall (Active)   Access Date 07/20/20 07/24/20 0800   Access Attempts 1 07/20/20 1512   Gauge 20 gauge;Power noncoring 90 degree bend 07/20/20 1512   Site Assessment WDL 07/26/20 0800   Line Status Infusing 07/26/20 0800   Extravasation? No 07/26/20 0800   Dressing Intervention Transparent 07/26/20 0800   Dressing change due 08/01/20 07/26/20 0800   Needle Change Due 08/01/20 07/26/20 0800   Line Necessity Yes, meets criteria 07/26/20 0800   De-Access Date 08/20/20 07/22/20 0800   Date to be Reflushed 08/20/20 07/22/20 2000   Number of days: 122        Past Medical History:   Diagnosis Date     Allergic rhinitis      Cancer (H)     stomach cancer     Depression      Lumbago      Migraine      Other chronic pain     low back     Sacroiliitis (H)     low back pain     Trochanteric bursitis     leg length discrrepancy, hip pain      Past Surgical History:   Procedure Laterality Date     ARTHROPLASTY HIP Left 2016     BACK SURGERY      L4-5 decompression     C REPAIR DETACH RETINA,SCLERAL BUCKLE       CATARACT IOL, RT/LT       CHOLECYSTECTOMY N/A 1/28/2020    Procedure: Cholecystectomy;  Surgeon: Yandel Mallory MD;  [facility]     ESOPHAGOSCOPY, GASTROSCOPY, DUODENOSCOPY (EGD), COMBINED N/A 3/3/2020    Procedure: ESOPHAGOGASTRODUODENOSCOPY, WITH ENDOSCOPIC US (enoxaparin);  Surgeon: Bakari Plata MD;  [facility]: [facility]     EYE SURGERY       IR CHEST PORT PLACEMENT > 5 YRS OF AGE  3/26/2020     OPEN REDUCTION INTERNAL FIXATION RODDING INTRAMEDULLARY FEMUR Left 12/23/2014 "    Procedure: OPEN REDUCTION INTERNAL FIXATION RODDING INTRAMEDULLARY FEMUR;  Surgeon: Arnol Santiago MD;  Location: [facility]     ORTHOPEDIC SURGERY       PICC DOUBLE LUMEN PLACEMENT Right 02/07/2020    5 fr double lumen 41 cm     REMOVE HARDWARE LOWER EXTREMITY Left 4/7/2015    Procedure: REMOVE HARDWARE LOWER EXTREMITY;  Surgeon: Arnol Santiago MD;  Location: [facility]     REMOVE HARDWARE RODDING INTRAMEDULLARY FEMUR Left 12/17/2015    Procedure: REMOVE HARDWARE RODDING INTRAMEDULLARY FEMUR;  Surgeon: Arnol Santiago MD;  Location: [facility]     RESECT BONE LOWER EXTREMITY Left 12/23/2014    Procedure: RESECT BONE LOWER EXTREMITY;  Surgeon: Arnol Santiago MD;  Location: [facility]     WHIPPLE PROCEDURE N/A 1/28/2020    Procedure: Open Whipple procedure and Cholecystectomy;  Surgeon: Yandel Mallory MD;  [facility]      Allergies   Allergen Reactions     Gluten Meal Palpitations     Augmentin Rash     Oxycontin [Oxycodone]      Confusion, loopy, oxycodone is tolerated     Pregabalin      interfered with Cymbalta     Topiramate      Tongue numbness, taste alteration, irritable      Acetaminophen Nausea     Urine retention       Dust Mite Extract      Gabapentin Dizziness and GI Disturbance     Latex Rash     Methadone Fatigue     Mold      Naprosyn [Naproxen] Dizziness and GI Disturbance     Oxymetholone      Seasonal Allergies         Anesthesia Evaluation     . Pt has had prior anesthetic. Type: General and MAC    No history of anesthetic complications          ROS/MED HX    ENT/Pulmonary: Comment: 01/28/20; 0814; Mask Ventilation: Easy with oral airway; Ease of Intubation: Easy; Airway Size: 7;  Cuffed;  Oral;  Blade Type: C-Mac;  Blade Size: 3;  Place by: Reynaldo;  Insertion Attempts: 1;  Secured at (cm)to lip: 21 cm;  Breath Sounds: Equal, clear and bilateral;  End Tidal CO2: Present;  Dentition: Intact;  Grade View of Cords: 1;  Airway Adjuncts:  C-Mac       (+)tobacco use, Current use 1/2 packs/day  , . .     Neurologic:     (+)neuropathy migraines,     Cardiovascular:  - neg cardiovascular ROS   (+) ----. : . . . :. . Previous cardiac testing Echodate:7/20/2020results:Interpretation Summary  Global and regional left ventricular function is normal with an EF of 60-65%.  Right ventricular function, chamber size, wall motion, and thickness are  normal.  Pulmonary artery systolic pressure cannot be assessed.  The inferior vena cava is normal.  No pericardial effusion is present.  Previous study not available for comparison.  date: results:ECG reviewed date:7/20/2020 results:Sinus tachycardia with nonspecific T wave abnormalities date: results:          METS/Exercise Tolerance:     Hematologic:     (+) Anemia, -      Musculoskeletal: Comment: Back, neg, leg, hip pain  (+) arthritis,  -       GI/Hepatic: Comment: S/P lucinda 1/2020        Renal/Genitourinary:  - ROS Renal section negative       Endo:  - neg endo ROS       Psychiatric:     (+) psychiatric history depression      Infectious Disease: Comment: Cholangitis on antibiotics        Malignancy:   (+) Malignancy History of GI  GI CA  Active status post Surgery,         Other: Comment: Oxycodone 30 mg/ d   (+) H/O Chronic Pain,H/O chronic opiod use ,                        PHYSICAL EXAM:   Mental Status/Neuro: A/A/O   Airway: Facies: Feasible  Mallampati: I  Mouth/Opening: Full  TM distance: > 6 cm  Neck ROM: Full   Respiratory:   Resp. Rate: Normal     Resp. Effort: Normal      CV: Rhythm: Regular  Rate: Age appropriate  Edema: None   Comments:      Dental: Details                  Assessment:   ASA SCORE: 3    H&P: History and physical reviewed and following examination; no interval change.   Smoking Status:  Non-Smoker/Unknown   NPO Status: NPO Appropriate     Plan:   Anes. Type:  General   Pre-Medication: None   Induction:  IV (Standard)   Airway: ETT; Oral   Access/Monitoring: PIV   Maintenance: Balanced     Postop Plan:   Postop Pain: Opioids  Postop  Sedation/Airway: Not planned  Disposition: Inpatient/Admit     PONV Management:   Adult Risk Factors: Female, Non-Smoker, Postop Opioids   Prevention: Ondansetron, Dexamethasone     CONSENT: Direct conversation   Plan and risks discussed with: Patient   Blood Products: Consent Deferred (Minimal Blood Loss)                   Juan Manuel Alaniz MD

## 2020-07-27 NOTE — PROGRESS NOTES
"[facility], [facility] Progress Note - Hospitalist Service, Gold 11       Date of Admission: 7/20/2020    Brief Hospital Course  This is a 54-year-old female with a past medical history of gastric adenocarcinoma status post chemotherapy, patient has additionally had complications associated with pancreatic cyst requiring Whipple procedure in early 2020.  Patient presented secondary to epigastric pain.  CT imaging was concerning for acute cholangitis.  Associated symptoms included nausea and vomiting.  Patient was started on empiric coverage antibiotics with Flagyl and Levaquin and transition to Flagyl cefepime. Ultimately Patient tolerated Zosyn. Clinical patient had worsening of abdominal pain and clinical picture. ERCP was performed on 7/27 with biliary dilation. Small biliary erosion was found and biopsied.      Cardiac work up for tachycardia is non revealing     UA revealed UTI without clinical picture. On board spectrum abx    Noted recent administration of  GCSF on 7/16    Assessment   Principal Problem:    Cholangitis  Active Problems:    MDD (major depressive disorder), recurrent, in partial remission (H)    Low urine output    Leukocytosis    Intraabdominal fluid collection    Gastric adenocarcinoma (H)    Thrombocytopenia (H)    Urinary tract infection      Plan   ERCP today  Hemodyanically elevated HR and soft bps thought to be consistent with  Infection. S/p possible source control. Monitor over next 24hours  Will start to wean pain meds as tolerated in next 24 hours  Tachycardia is likely from infectious picture. Lactate is WNL  Replace electrolytes. Check Magnesium    Subjective   Chief complaint: \"Ab pain is stable from yesterday\"    No bowel or bladder complaints  Pain worsens with movement  No edema  No nausea vomiting fevers chills chest pain shortness of breath or palpitations       Review of Systems  10 point ROS performed and is negative except as mention " "above     Physical Exam  Vital signs:  Temp: 98.6  F (37  C) Temp src: Oral BP: 101/65 Pulse: 105 Heart Rate: 103 Resp: 16 SpO2: 97 % O2 Device: None (Room air)   Height: 172.7 cm (5' 8\") Weight: 83.6 kg (184 lb 6.4 oz)  Estimated body mass index is 28.04 kg/m  as calculated from the following:    Height as of this encounter: 1.727 m (5' 8\").    Weight as of this encounter: 83.6 kg (184 lb 6.4 oz).        Physical Exam  Constitutional:       Appearance: Normal appearance.   HENT:      Head: Normocephalic and atraumatic.      Mouth/Throat:      Mouth: Mucous membranes are moist.   Cardiovascular:      Rate and Rhythm: Normal rate and regular rhythm.      Heart sounds: No murmur. No friction rub. No gallop.    Abdominal:      General: Abdomen is flat. Bowel sounds are normal. There is no distension.      Palpations: There is no mass.      Tenderness: There is abdominal tenderness. There is guarding.   Skin:     Coloration: Skin is not jaundiced.   Neurological:      General: No focal deficit present.      Mental Status: She is alert and oriented to person, place, and time.      Cranial Nerves: No cranial nerve deficit.      Sensory: No sensory deficit.      Motor: No weakness.   Psychiatric:         Mood and Affect: Mood normal.         Behavior: Behavior normal.           Labs  Lab Results   Component Value Date    WBC 22.0 (H) 07/27/2020    HGB 8.0 (L) 07/27/2020    HCT 26.6 (L) 07/27/2020     07/27/2020     07/27/2020    POTASSIUM 3.3 (L) 07/27/2020    CHLORIDE 113 (H) 07/27/2020    CO2 24 07/27/2020    BUN 3 (L) 07/27/2020    CR 0.84 07/27/2020    GLC 83 07/27/2020    SED 27 07/20/2020    DD 1.2 (H) 07/22/2020    NTBNPI 412 01/30/2020    NTBNP 184 (H) 01/17/2020    TROPI <0.015 07/26/2020    AST 31 07/26/2020    ALT 22 07/26/2020    ALKPHOS 175 (H) 07/26/2020    BILITOTAL 0.3 07/26/2020    INR 1.32 (H) 07/27/2020         Imaging   Results for orders placed or performed during the hospital encounter of " 07/20/20   CT Abdomen Pelvis w Contrast    Impression    IMPRESSION:   1. Thickening and enhancement of the bile duct proximal to the  choledochojejunostomy with surrounding inflammatory change, slightly  increased, as can be seen with cholangitis.  2. Fluid with mild adjacent peritoneal thickening and enhancement in  the pelvis posterior to the uterus. This could represent small volume  ascites or a subtle sign of peritonitis.  3. Subtle thickening and enhancement of portions of the jejunum,  sigmoid colon, and proximal transverse colon, this may be secondary to  lack of full distention however or mild enterocolitis.  2. Stable soft tissue nodularity along the right abdominal wall and  right inguinal region, this is favored to be secondary to fat necrosis  from prior fluid collections presence but lack of activity on PET/CT.  3. Decreased main pancreatic ductal dilation.  4. Subtle hazy appearance of the mesentery, this is presumed to be  reactive in nature.  5. Mild left lower lobe groundglass opacity, nonspecific, infection  versus atelectasis.    I have personally reviewed the examination and initial interpretation  and I agree with the findings.    MD Julien ORTEGA MD on 7/21/2020 at 12:41 PM    Time Spent: 25+ minutes

## 2020-07-28 ENCOUNTER — APPOINTMENT (OUTPATIENT)
Dept: CT IMAGING | Facility: CLINIC | Age: 55
DRG: 445 | End: 2020-07-28
Attending: INTERNAL MEDICINE
Payer: MEDICARE

## 2020-07-28 LAB
ALBUMIN SERPL-MCNC: 2 G/DL (ref 3.4–5)
ALP SERPL-CCNC: 217 U/L (ref 40–150)
ALT SERPL W P-5'-P-CCNC: 28 U/L (ref 0–50)
ANION GAP SERPL CALCULATED.3IONS-SCNC: 6 MMOL/L (ref 3–14)
AST SERPL W P-5'-P-CCNC: 45 U/L (ref 0–45)
BASOPHILS # BLD AUTO: 0.1 10E9/L (ref 0–0.2)
BASOPHILS NFR BLD AUTO: 0.3 %
BILIRUB DIRECT SERPL-MCNC: 0.1 MG/DL (ref 0–0.2)
BILIRUB SERPL-MCNC: 0.4 MG/DL (ref 0.2–1.3)
BUN SERPL-MCNC: 3 MG/DL (ref 7–30)
CALCIUM SERPL-MCNC: 8.1 MG/DL (ref 8.5–10.1)
CHLORIDE SERPL-SCNC: 115 MMOL/L (ref 94–109)
CO2 SERPL-SCNC: 20 MMOL/L (ref 20–32)
COPATH REPORT: NORMAL
CREAT SERPL-MCNC: 0.91 MG/DL (ref 0.52–1.04)
DIFFERENTIAL METHOD BLD: ABNORMAL
EOSINOPHIL # BLD AUTO: 0 10E9/L (ref 0–0.7)
EOSINOPHIL NFR BLD AUTO: 0 %
ERYTHROCYTE [DISTWIDTH] IN BLOOD BY AUTOMATED COUNT: 24.9 % (ref 10–15)
GFR SERPL CREATININE-BSD FRML MDRD: 72 ML/MIN/{1.73_M2}
GLUCOSE SERPL-MCNC: 112 MG/DL (ref 70–99)
HCT VFR BLD AUTO: 28.1 % (ref 35–47)
HGB BLD-MCNC: 8.6 G/DL (ref 11.7–15.7)
IMM GRANULOCYTES # BLD: 0.8 10E9/L (ref 0–0.4)
IMM GRANULOCYTES NFR BLD: 2.8 %
LACTATE BLD-SCNC: 0.8 MMOL/L (ref 0.7–2)
LYMPHOCYTES # BLD AUTO: 2.4 10E9/L (ref 0.8–5.3)
LYMPHOCYTES NFR BLD AUTO: 8.1 %
MCH RBC QN AUTO: 27.5 PG (ref 26.5–33)
MCHC RBC AUTO-ENTMCNC: 30.6 G/DL (ref 31.5–36.5)
MCV RBC AUTO: 90 FL (ref 78–100)
MONOCYTES # BLD AUTO: 1.3 10E9/L (ref 0–1.3)
MONOCYTES NFR BLD AUTO: 4.3 %
NEUTROPHILS # BLD AUTO: 25.4 10E9/L (ref 1.6–8.3)
NEUTROPHILS NFR BLD AUTO: 84.5 %
NRBC # BLD AUTO: 0.1 10*3/UL
NRBC BLD AUTO-RTO: 0 /100
PLATELET # BLD AUTO: 247 10E9/L (ref 150–450)
POTASSIUM SERPL-SCNC: 3.6 MMOL/L (ref 3.4–5.3)
POTASSIUM SERPL-SCNC: 3.7 MMOL/L (ref 3.4–5.3)
PROT SERPL-MCNC: 4.9 G/DL (ref 6.8–8.8)
RBC # BLD AUTO: 3.13 10E12/L (ref 3.8–5.2)
SODIUM SERPL-SCNC: 142 MMOL/L (ref 133–144)
WBC # BLD AUTO: 30 10E9/L (ref 4–11)

## 2020-07-28 PROCEDURE — 12000012 ZZH R&B MS OVERFLOW UMMC

## 2020-07-28 PROCEDURE — 85025 COMPLETE CBC W/AUTO DIFF WBC: CPT | Performed by: PHYSICIAN ASSISTANT

## 2020-07-28 PROCEDURE — 25000132 ZZH RX MED GY IP 250 OP 250 PS 637: Mod: GY | Performed by: INTERNAL MEDICINE

## 2020-07-28 PROCEDURE — 25000128 H RX IP 250 OP 636: Performed by: INTERNAL MEDICINE

## 2020-07-28 PROCEDURE — 25800030 ZZH RX IP 258 OP 636: Performed by: INTERNAL MEDICINE

## 2020-07-28 PROCEDURE — 82248 BILIRUBIN DIRECT: CPT | Performed by: PHYSICIAN ASSISTANT

## 2020-07-28 PROCEDURE — 99233 SBSQ HOSP IP/OBS HIGH 50: CPT | Performed by: INTERNAL MEDICINE

## 2020-07-28 PROCEDURE — 80053 COMPREHEN METABOLIC PANEL: CPT | Performed by: PHYSICIAN ASSISTANT

## 2020-07-28 PROCEDURE — 74177 CT ABD & PELVIS W/CONTRAST: CPT

## 2020-07-28 PROCEDURE — 84132 ASSAY OF SERUM POTASSIUM: CPT | Performed by: INTERNAL MEDICINE

## 2020-07-28 PROCEDURE — 25000128 H RX IP 250 OP 636: Performed by: STUDENT IN AN ORGANIZED HEALTH CARE EDUCATION/TRAINING PROGRAM

## 2020-07-28 PROCEDURE — 83605 ASSAY OF LACTIC ACID: CPT | Performed by: INTERNAL MEDICINE

## 2020-07-28 RX ORDER — IOPAMIDOL 755 MG/ML
115 INJECTION, SOLUTION INTRAVASCULAR ONCE
Status: COMPLETED | OUTPATIENT
Start: 2020-07-28 | End: 2020-07-28

## 2020-07-28 RX ADMIN — SIMETHICONE 80 MG: 80 TABLET, CHEWABLE ORAL at 15:59

## 2020-07-28 RX ADMIN — PIPERACILLIN AND TAZOBACTAM 3.38 G: 3; .375 INJECTION, POWDER, FOR SOLUTION INTRAVENOUS at 15:02

## 2020-07-28 RX ADMIN — SIMETHICONE 80 MG: 80 TABLET, CHEWABLE ORAL at 09:45

## 2020-07-28 RX ADMIN — HYDROMORPHONE HYDROCHLORIDE 4 MG: 2 TABLET ORAL at 03:36

## 2020-07-28 RX ADMIN — HYDROMORPHONE HYDROCHLORIDE 4 MG: 2 TABLET ORAL at 12:48

## 2020-07-28 RX ADMIN — AMITRIPTYLINE HYDROCHLORIDE 75 MG: 75 TABLET, FILM COATED ORAL at 21:50

## 2020-07-28 RX ADMIN — FAMOTIDINE 20 MG: 20 TABLET ORAL at 09:26

## 2020-07-28 RX ADMIN — HYDROMORPHONE HYDROCHLORIDE 1 MG: 1 INJECTION, SOLUTION INTRAMUSCULAR; INTRAVENOUS; SUBCUTANEOUS at 19:53

## 2020-07-28 RX ADMIN — CITALOPRAM HYDROBROMIDE 20 MG: 20 TABLET, FILM COATED ORAL at 09:27

## 2020-07-28 RX ADMIN — HYDROMORPHONE HYDROCHLORIDE 4 MG: 2 TABLET ORAL at 19:07

## 2020-07-28 RX ADMIN — HYDROMORPHONE HYDROCHLORIDE 4 MG: 2 TABLET ORAL at 09:45

## 2020-07-28 RX ADMIN — HYDROMORPHONE HYDROCHLORIDE 4 MG: 2 TABLET ORAL at 15:57

## 2020-07-28 RX ADMIN — POTASSIUM CHLORIDE 20 MEQ: 750 TABLET, EXTENDED RELEASE ORAL at 09:27

## 2020-07-28 RX ADMIN — PIPERACILLIN AND TAZOBACTAM 3.38 G: 3; .375 INJECTION, POWDER, FOR SOLUTION INTRAVENOUS at 09:26

## 2020-07-28 RX ADMIN — ONDANSETRON 4 MG: 4 TABLET, ORALLY DISINTEGRATING ORAL at 19:52

## 2020-07-28 RX ADMIN — ONDANSETRON 4 MG: 4 TABLET, ORALLY DISINTEGRATING ORAL at 09:45

## 2020-07-28 RX ADMIN — IOPAMIDOL 115 ML: 755 INJECTION, SOLUTION INTRAVENOUS at 13:58

## 2020-07-28 RX ADMIN — MULTIPLE VITAMINS W/ MINERALS TAB 1 TABLET: TAB at 09:27

## 2020-07-28 RX ADMIN — HYDROMORPHONE HYDROCHLORIDE 1 MG: 1 INJECTION, SOLUTION INTRAMUSCULAR; INTRAVENOUS; SUBCUTANEOUS at 11:04

## 2020-07-28 RX ADMIN — PIPERACILLIN AND TAZOBACTAM 3.38 G: 3; .375 INJECTION, POWDER, FOR SOLUTION INTRAVENOUS at 21:50

## 2020-07-28 RX ADMIN — PANTOPRAZOLE SODIUM 40 MG: 40 TABLET, DELAYED RELEASE ORAL at 19:53

## 2020-07-28 RX ADMIN — PIPERACILLIN AND TAZOBACTAM 3.38 G: 3; .375 INJECTION, POWDER, FOR SOLUTION INTRAVENOUS at 03:28

## 2020-07-28 RX ADMIN — PANTOPRAZOLE SODIUM 40 MG: 40 TABLET, DELAYED RELEASE ORAL at 09:27

## 2020-07-28 RX ADMIN — HYDROMORPHONE HYDROCHLORIDE 4 MG: 2 TABLET ORAL at 06:46

## 2020-07-28 RX ADMIN — SODIUM CHLORIDE: 9 INJECTION, SOLUTION INTRAVENOUS at 03:29

## 2020-07-28 ASSESSMENT — ACTIVITIES OF DAILY LIVING (ADL)
ADLS_ACUITY_SCORE: 13

## 2020-07-28 ASSESSMENT — PAIN DESCRIPTION - DESCRIPTORS
DESCRIPTORS: BURNING;SHARP
DESCRIPTORS: ACHING

## 2020-07-28 ASSESSMENT — MIFFLIN-ST. JEOR: SCORE: 1509.89

## 2020-07-28 NOTE — PLAN OF CARE
"1900-0730: /62 (BP Location: Left arm)   Pulse 97   Temp 97.7  F (36.5  C) (Temporal)   Resp 18   Ht 1.727 m (5' 8\")   Wt 83.6 kg (184 lb 6.4 oz)   LMP 09/23/2014   SpO2 98%   BMI 28.04 kg/m      Patient post ERCP yesterday. C/o RUQ abdominal pain, dilaudid requested, dilaudid po x 3 and IV x 1 given. Potassium re-check WNL. IVF through port-a-cath at 100 ml/hr. Tolerating water intake. Adequate urine output. Continue to monitor and plan of care.      Problem: Adult Inpatient Plan of Care  Goal: Plan of Care Review  7/28/2020 0614 by Crissy Mallory RN  Outcome: No Change     Problem: Cardiac Disease Comorbidity  Goal: Cardiac Disease  Description: Patient comorbidity will be monitored for signs and symptoms of Cardiac Disease.  Problems will be absent, minimized or managed by discharge/transition of care.  7/28/2020 0614 by Crissy Mallory RN  Outcome: No Change     Problem: Gastrointestinal Condition Comorbidity  Goal: Gastrointestinal Condition  Description: Patient comorbidity will be monitored for signs and symptoms of Gastrointestinal condition.  Problems will be absent, minimized or managed by discharge/transition of care.  7/28/2020 0614 by Crissy Mallory RN  Outcome: No Change     Problem: Pain Chronic (Persistent) (Comorbidity Management)  Goal: Acceptable Pain Control and Functional Ability  7/28/2020 0614 by Crissy Mallory, RN  Outcome: No Change     Problem: Pain Acute  Goal: Optimal Pain Control  7/28/2020 0614 by Crissy Mallory, RN  Outcome: No Change     "

## 2020-07-28 NOTE — PROGRESS NOTES
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  CT scan of the abdomen and pelvis obtained revealed thickening and enhancement of the bile duct proximal to the choledochojejunostomy with surrounding inflammation concerning for cholangitis.  Her liver tests are mildly abnormal including alkaline phosphatase that is increased from her baseline.  Her white blood cell count was 25,000 on admission but did just receive Neulasta 4 days prior to admission. This initially normalized but is on the rise again.    At this point it is still unclear if her symptoms are related to cholangitis despite CT findings.  She does not have any biliary dilation. Tbili bumped to 1.7 (now normalized) and WBC climbing as well (30K today). Underwent enteroscopy assisted ERCP 7/27 with no obvious e/o cholangitis but choledochojejunostomy was dilated to 6mm. The biliary limb did have nonspecific small erosions which were sampled (path pending). Unclear why she is having ongoing RUQ pain as well as worsening leukocytosis - would consider repeating CT scan today.     Recommendations:  Continue abx  Repeat CT scan abd/pelv with IV contrast today  Trend WBC  Await pathology results  Consider alternate source for leukocytosis  Continue PPI  Analgesia/Antiemetics per primary team  Discussed with primary team Dr. Dixon    The patient was discussed and plan agreed upon with GI staff, Dr Aviles.      Mirta Neri PA-C  Advanced Endoscopy/Pancreaticobiliary GI Service  [facility]  Pager *[phone]  Text  "Page  _______________________________________________________________      Subjective: Nursing notes and 24hr events reviewed. Patient seen and examined at 0945. Reports RUQ abdominal pain is unchanged. Worse with movement and walking around the room. Also reports nausea, Zofran helps. Having loose stools. No fevers or chills.     ROS:   4 pt ROS negative unless noted in subjective.     Objective:  Blood pressure 100/57, pulse 109, temperature 97  F (36.1  C), temperature source Temporal, resp. rate 18, height 1.727 m (5' 8\"), weight 85.6 kg (188 lb 12.8 oz), last menstrual period 09/23/2014, SpO2 96 %, not currently breastfeeding.  Gen: Sitting comfortably in bed. NAD  HEENT: NCAT. Conjunctiva clear. Sclera anicteric  Resp: normal work of breathing  Abd: tender RUQ, ND, +BS  Msk: no gross deformity  Skin:  no jaundice  Ext: warm, well perfused  Neuro: grossly normal  Mental status/Psych: A&O. Asks/answers questions appropriately     LABS:  BMP  Recent Labs   Lab 07/28/20 0333 07/27/20 2025 07/27/20  0926 07/27/20  0327  07/26/20  0320 07/26/20  0010 07/25/20  0254     --   --  140  --  139  --  139   POTASSIUM 3.7  3.6 4.0 3.3* 3.3*   < > 3.0*  --  3.2*   CHLORIDE 115*  --   --  113*  --  109  --  107   PETRA 8.1*  --   --  7.7*  --  7.6*  --  7.7*   CO2 20  --   --  24  --  25  --  28   BUN 3*  --   --  3*  --  4*  --  3*   CR 0.91  --   --  0.84  --  0.78 0.86 0.75   *  --   --  83  --  82  --  86    < > = values in this interval not displayed.     CBC  Recent Labs   Lab 07/28/20 0333 07/27/20 0327 07/26/20 0320 07/25/20  0254   WBC 30.0* 22.0* 16.2* 15.8*   RBC 3.13* 2.96* 2.84* 2.91*   HGB 8.6* 8.0* 7.6* 7.8*   HCT 28.1* 26.6* 25.5* 26.0*   MCV 90 90 90 89   MCH 27.5 27.0 26.8 26.8   MCHC 30.6* 30.1* 29.8* 30.0*   RDW 24.9* 24.2* 24.0* 23.9*    225 170 155     INR  Recent Labs   Lab 07/27/20  1015   INR 1.32*     LFTs  Recent Labs   Lab 07/28/20 0333 07/26/20 0320 07/24/20  0333 " 07/23/20  0925   ALKPHOS 217* 175* 219* 229*   AST 45 31 50* 57*   ALT 28 22 34 38   BILITOTAL 0.4 0.3 0.6 1.7*   PROTTOTAL 4.9* 4.4* 4.8* 5.0*   ALBUMIN 2.0* 1.8* 1.9* 2.0*      PANC  No lab results found in last 7 days.      IMAGING:  reviewed

## 2020-07-28 NOTE — PLAN OF CARE
VSS. Afebrile. CT of abdoment & pelvis completed per GI. MD concerned about increasing WBC. Dialudid PO given x3. IV dilaudid given x1. Continue to monitor.    Problem: Adult Inpatient Plan of Care  Goal: Plan of Care Review  7/28/2020 1854 by Julieta Espinal RN  Outcome: No Change     Problem: Adult Inpatient Plan of Care  Goal: Patient-Specific Goal (Individualization)  7/28/2020 1854 by Julieta Espinal RN  Outcome: No Change     Problem: Adult Inpatient Plan of Care  Goal: Absence of Hospital-Acquired Illness or Injury  7/28/2020 1854 by Julieta Espinal RN  Outcome: No Change     Problem: Adult Inpatient Plan of Care  Goal: Optimal Comfort and Wellbeing  7/28/2020 1854 by Julieta Espinal RN  Outcome: No Change     Problem: Adult Inpatient Plan of Care  Goal: Readiness for Transition of Care  7/28/2020 1854 by Julieta Espinal RN  Outcome: No Change     Problem: Adult Inpatient Plan of Care  Goal: Rounds/Family Conference  7/28/2020 1854 by Julieta Espinal RN  Outcome: No Change     Problem: Cardiac Disease Comorbidity  Goal: Cardiac Disease  Description: Patient comorbidity will be monitored for signs and symptoms of Cardiac Disease.  Problems will be absent, minimized or managed by discharge/transition of care.  7/28/2020 1854 by Julieta Espinal RN  Outcome: No Change     Problem: Gastrointestinal Condition Comorbidity  Goal: Gastrointestinal Condition  Description: Patient comorbidity will be monitored for signs and symptoms of Gastrointestinal condition.  Problems will be absent, minimized or managed by discharge/transition of care.  7/28/2020 1854 by Julieta Espinal RN  Outcome: No Change     Problem: Pain Chronic (Persistent) (Comorbidity Management)  Goal: Acceptable Pain Control and Functional Ability  7/28/2020 1854 by Julieta Espinal RN  Outcome: No Change     Problem: Pain Acute  Goal: Optimal Pain Control  7/28/2020 1854 by Julieta Espinal RN  Outcome: No Change

## 2020-07-28 NOTE — PROGRESS NOTES
General acute hospital, [facility] Progress Note - Hospitalist Service, [facility]       Date of Admission:  7/20/2020  Assessment & Plan   Ms. 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 CT abdomen and pelvis revealed thickening and enhancement of the bile duct proximal to the choledochojejunostomy with surrounding inflammation, consistent with cholangitis, 3.2x4.5cm fluid collection in the pelvis posterior to the uterus with subtle thickening and enhancement of the jejunum, sigmoid colon and proximal transverse colon. She was started on Flagyl and Levaquin, currently on cefepime and flagyl. Underwent ERCP on 7/27 with biliary dilation. Also found to have small biliary erosion s/p biopsy. Has been tachycardic     RUQ pain  Acute cholangitis with associated nausea, abdominal pain  3.2 x 4.5cm fluid collection in pelvis  Gastroenterology consulted, s/p ERCP 7/27. Had dilation of the duct as well as biopsy of erosion.  -Continue pain management, wean as able  -Continue with Zofran and Compazine prn  - on zosyn, anticipate switching to oral meds on discharge  - advance diet as tolerated  - follow up pathology  - switch famotidine to PPI at least once a day for 60 days per GI rec   - discussed pain control, she is electing to use IV. Discussed possible discharge in 1-2 days and she does not feel being on IV pain med would be a barrier to discharge. At this time, keep the order as is, but continue to encourage to use oral pain  med    Persisting leukocytosis  Clinically stable, but still having RUQ pain and WBC uptrending. Per GI, ERCP finding was not that significant for acute cholangitis and increasing WBC is not well explained.  - CT abd/pelvis  today     Poorly differentiated gastric adenocarcinoma, HER-2 negative status post neoadjuvant FLOT (3/7/20) and cycle 7   Oxaliplatin related neuropathy, stable  Bilateral hand rash secondary to 5-Follow-up, improving  She has required a dose reduction of her oxaliplatin by 20% due to cold sensitivity/neuropathy with cycle #5. 5FU was decreased to 85% with cycle #6 due to mucositis/diarrhea/taste changes. Cycle 7 was initially held secondary to diarrhea, then given at her 7/15/20 visit.   -Medical Oncology consult for co-management of chemotherapy related symptoms and management of cancer while hospitalized  -Continue with hand and foot soaks twice daily as tolerated, Eucerin cream as often as needed to keep skin moisturized and hydrocortisone cream prn for irritation    Asymptomatic pyuria  UA on admission showed WBC 57. Culture mixed yasmine  - not significant for UTI but on antibiotic for intraabdominal infection    GERD  -Protonix 40mg twice daiyl and Famotidine 20mg twice daily   -TUMS prn for any epigastric pains  -Simethicone prn for any difficulties with gas       Diet: Regular Diet Adult    DVT Prophylaxis: off AC due to recent ERCP  Hernandez Catheter: not present  Code Status: Full Code           Disposition Plan   Expected discharge: 2 - 3 days, recommended to prior living arrangement once infection adequately treated.  Entered: Amanda Dixon MD 07/28/2020, 6:06 AM       The patient's care was discussed with the Bedside Nurse, Care Coordinator/, Patient and GI and onc Consultant.    Amanda Dixon MD  Hospitalist Service, [address], [place]  Pager: [phone]  Please see sticky note for cross cover information  ______________________________________________________________________    Interval History   Doing OK, but still having RUQ pain, no nausea or vomitting. Other 4 point ROS negative. Noted worsenign WBC    Data reviewed today: I reviewed all  medications, new labs and imaging results over the last 24 hours.    Physical Exam   Vital Signs: Temp: 97.7  F (36.5  C) Temp src: Temporal BP: 111/62 Pulse: 97 Heart Rate: 93 Resp: 18 SpO2: 98 % O2 Device: None (Room air)    Weight: 184 lbs 6.4 oz  Constitutional: awake, alert, cooperative, no apparent distress, and appears stated age  Eyes: no jaundice  ENT: normocepalic, without obvious abnormality  Hematologic / Lymphatic: no cervical lymphadenopathy  Respiratory: No increased work of breathing, good air exchange, clear to auscultation bilaterally, no crackles or wheezing  Cardiovascular: RRR  GI: tenderness over RUQ no rebound  Skin: well healed mid abdominal scar  Neurologic: Awake, alert, oriented to name, place and time.  Cranial nerves II-XII are grossly intact.    Data   Recent Labs   Lab 07/28/20  0333 07/27/20 2025 07/27/20  1015 07/27/20  0926 07/27/20  0327  07/26/20  0320   WBC 30.0*  --   --   --  22.0*  --  16.2*   HGB 8.6*  --   --   --  8.0*  --  7.6*   MCV 90  --   --   --  90  --  90     --   --   --  225  --  170   INR  --   --  1.32*  --   --   --   --      --   --   --  140  --  139   POTASSIUM 3.7  3.6 4.0  --  3.3* 3.3*   < > 3.0*   CHLORIDE 115*  --   --   --  113*  --  109   CO2 20  --   --   --  24  --  25   BUN 3*  --   --   --  3*  --  4*   CR 0.91  --   --   --  0.84  --  0.78   ANIONGAP 6  --   --   --  4  --  6   PETRA 8.1*  --   --   --  7.7*  --  7.6*   *  --   --   --  83  --  82   ALBUMIN 2.0*  --   --   --   --   --  1.8*   PROTTOTAL 4.9*  --   --   --   --   --  4.4*   BILITOTAL 0.4  --   --   --   --   --  0.3   ALKPHOS 217*  --   --   --   --   --  175*   ALT 28  --   --   --   --   --  22   AST 45  --   --   --   --   --  31   TROPI  --   --   --   --   --   --  <0.015    < > = values in this interval not displayed.     Recent Results (from the past 24 hour(s))   CT Abdomen Pelvis w Contrast    Narrative    EXAMINATION: CT ABDOMEN PELVIS W CONTRAST,  7/28/2020 2:06 PM    TECHNIQUE:  Helical CT images from the lung bases through the  symphysis pubis were obtained with IV contrast. Contrast dose:  iopamidol (ISOVUE-370) solution 115 mL    COMPARISON: CT abdomen 7/20/2020    HISTORY: abd pain, s/p ERCP not impressive finding but WBC keeps  uptrending    FINDINGS:    Abdomen and pelvis: Postoperative changes of Whipple. There is mild  dilation and wall thickening of the pancreatic and biliary limb of the  Billroth II reconstruction, with a transition point at the anastomosis  with the stomach. The alimentary limb of the Bilroth 2 reconstruction  is normal caliber. The cecum and left colon contains liquid stool.  Mild colonic diverticulosis, predominantly the sigmoid colon. Normal  appendix.    Small amount of central pneumobilia, which can be seen after  instrumentation or surgery. Mild enhancement of the wall of the common  bile duct, similar to the prior CT. Focal fatty infiltration near the  kd hepatis. Gallbladder is surgically absent. The portal vein,  superior mesenteric vein, and splenic vein are patent.    The spleen and adrenal glands are normal. Small amount of air in the  pancreatic duct, also likely related to the patient's Whipple/ERCP.    No hydronephrosis, cystic or solid renal mass. Urinary bladder is  decompressed. Portions of the pelvis are obscured by streak artifact  from patient's left total hip arthroplasty.    There are no enlarged lymph nodes by CT short axis size criteria. No  aortic aneurysm, or free air. Small amount of free fluid in the  pelvis. Unchanged soft tissue thickening in the area of the anterior  lower right abdominal wall (series 5 image 247) and measuring 1.7 cm.    Lung bases: Focal atelectasis or consolidation in the medial right  lower lobe. Patchy opacity in the lingula is unchanged.    Bones and soft tissues: Postoperative changes of left total hip  arthroplasty. Left sacroiliac fusion device with good bony fusion  in  the central portion of the sacroiliac joint. Degenerative changes of  lumbar spine. Diffuse anasarca.      Impression    IMPRESSION:   1. The pancreatic biliary limb of the Billroth II is dilated and  thick-walled, with a transition point at the anastomosis with the  stomach. The alimentary limb of the Billroth II is relatively normal  caliber. Primary considerations are enteritis of the  pancreaticobiliary limb versus ileus. Obstruction seems unlikely,  given the recent ERCP likely traversed this limb of small bowel,  including the anastomosis.     2. Unchanged mild enhancement of the common bile duct. Recommend  correlation with ERCP findings.    3. New small subsegmental atelectasis or consolidation in the medial  right lower lobe. Favor aspiration and/or atelectasis.    4. Liquid stool in the colon, with overall decreased diffuse colonic  wall thickening, most likely an improving colitis.    5. Increased anasarca, with unchanged small volume of ascites in the  pelvis.    6. Unchanged soft tissue thickening in the right inguinal area,  compatible with prior right inguinal hernia repair.

## 2020-07-29 ENCOUNTER — PATIENT OUTREACH (OUTPATIENT)
Dept: CARE COORDINATION | Facility: CLINIC | Age: 55
End: 2020-07-29

## 2020-07-29 ENCOUNTER — APPOINTMENT (OUTPATIENT)
Dept: GENERAL RADIOLOGY | Facility: CLINIC | Age: 55
DRG: 445 | End: 2020-07-29
Attending: INTERNAL MEDICINE
Payer: MEDICARE

## 2020-07-29 VITALS
BODY MASS INDEX: 29.81 KG/M2 | WEIGHT: 196.7 LBS | SYSTOLIC BLOOD PRESSURE: 102 MMHG | OXYGEN SATURATION: 99 % | DIASTOLIC BLOOD PRESSURE: 58 MMHG | HEART RATE: 107 BPM | TEMPERATURE: 98.8 F | RESPIRATION RATE: 18 BRPM | HEIGHT: 68 IN

## 2020-07-29 LAB
ANION GAP SERPL CALCULATED.3IONS-SCNC: 5 MMOL/L (ref 3–14)
BASOPHILS # BLD AUTO: 0.1 10E9/L (ref 0–0.2)
BASOPHILS NFR BLD AUTO: 0.4 %
BUN SERPL-MCNC: 2 MG/DL (ref 7–30)
CALCIUM SERPL-MCNC: 7.6 MG/DL (ref 8.5–10.1)
CHLORIDE SERPL-SCNC: 113 MMOL/L (ref 94–109)
CO2 SERPL-SCNC: 23 MMOL/L (ref 20–32)
CREAT SERPL-MCNC: 0.89 MG/DL (ref 0.52–1.04)
CRP SERPL-MCNC: 11 MG/L (ref 0–8)
DIFFERENTIAL METHOD BLD: ABNORMAL
EOSINOPHIL # BLD AUTO: 0.1 10E9/L (ref 0–0.7)
EOSINOPHIL NFR BLD AUTO: 0.3 %
ERYTHROCYTE [DISTWIDTH] IN BLOOD BY AUTOMATED COUNT: 24.8 % (ref 10–15)
GFR SERPL CREATININE-BSD FRML MDRD: 73 ML/MIN/{1.73_M2}
GLUCOSE SERPL-MCNC: 74 MG/DL (ref 70–99)
HCT VFR BLD AUTO: 24.9 % (ref 35–47)
HGB BLD-MCNC: 7.4 G/DL (ref 11.7–15.7)
IMM GRANULOCYTES # BLD: 0.6 10E9/L (ref 0–0.4)
IMM GRANULOCYTES NFR BLD: 2.5 %
LACTATE BLD-SCNC: 0.7 MMOL/L (ref 0.7–2)
LYMPHOCYTES # BLD AUTO: 3.4 10E9/L (ref 0.8–5.3)
LYMPHOCYTES NFR BLD AUTO: 15.2 %
MAGNESIUM SERPL-MCNC: 1.6 MG/DL (ref 1.6–2.3)
MAGNESIUM SERPL-MCNC: 2.6 MG/DL (ref 1.6–2.3)
MCH RBC QN AUTO: 26.6 PG (ref 26.5–33)
MCHC RBC AUTO-ENTMCNC: 29.7 G/DL (ref 31.5–36.5)
MCV RBC AUTO: 90 FL (ref 78–100)
MONOCYTES # BLD AUTO: 1.3 10E9/L (ref 0–1.3)
MONOCYTES NFR BLD AUTO: 5.7 %
NEUTROPHILS # BLD AUTO: 17 10E9/L (ref 1.6–8.3)
NEUTROPHILS NFR BLD AUTO: 75.9 %
NRBC # BLD AUTO: 0 10*3/UL
NRBC BLD AUTO-RTO: 0 /100
PLATELET # BLD AUTO: 230 10E9/L (ref 150–450)
POTASSIUM SERPL-SCNC: 3.2 MMOL/L (ref 3.4–5.3)
POTASSIUM SERPL-SCNC: 3.4 MMOL/L (ref 3.4–5.3)
RBC # BLD AUTO: 2.78 10E12/L (ref 3.8–5.2)
SODIUM SERPL-SCNC: 140 MMOL/L (ref 133–144)
WBC # BLD AUTO: 22.4 10E9/L (ref 4–11)

## 2020-07-29 PROCEDURE — 25000128 H RX IP 250 OP 636: Performed by: INTERNAL MEDICINE

## 2020-07-29 PROCEDURE — 86140 C-REACTIVE PROTEIN: CPT | Performed by: INTERNAL MEDICINE

## 2020-07-29 PROCEDURE — 25800030 ZZH RX IP 258 OP 636: Performed by: INTERNAL MEDICINE

## 2020-07-29 PROCEDURE — 80048 BASIC METABOLIC PNL TOTAL CA: CPT | Performed by: INTERNAL MEDICINE

## 2020-07-29 PROCEDURE — 99239 HOSP IP/OBS DSCHRG MGMT >30: CPT | Performed by: INTERNAL MEDICINE

## 2020-07-29 PROCEDURE — 83735 ASSAY OF MAGNESIUM: CPT | Performed by: INTERNAL MEDICINE

## 2020-07-29 PROCEDURE — 71046 X-RAY EXAM CHEST 2 VIEWS: CPT

## 2020-07-29 PROCEDURE — 25000132 ZZH RX MED GY IP 250 OP 250 PS 637: Mod: GY | Performed by: INTERNAL MEDICINE

## 2020-07-29 PROCEDURE — 84132 ASSAY OF SERUM POTASSIUM: CPT | Performed by: INTERNAL MEDICINE

## 2020-07-29 PROCEDURE — 83605 ASSAY OF LACTIC ACID: CPT | Performed by: INTERNAL MEDICINE

## 2020-07-29 PROCEDURE — 85025 COMPLETE CBC W/AUTO DIFF WBC: CPT | Performed by: INTERNAL MEDICINE

## 2020-07-29 PROCEDURE — 99233 SBSQ HOSP IP/OBS HIGH 50: CPT | Performed by: INTERNAL MEDICINE

## 2020-07-29 RX ORDER — METRONIDAZOLE 500 MG/1
500 TABLET ORAL 3 TIMES DAILY
Qty: 15 TABLET | Refills: 0 | Status: SHIPPED | OUTPATIENT
Start: 2020-07-29 | End: 2020-08-14

## 2020-07-29 RX ORDER — OXYCODONE HYDROCHLORIDE 5 MG/1
5-10 TABLET ORAL EVERY 6 HOURS PRN
Qty: 15 TABLET | Refills: 0 | Status: SHIPPED | OUTPATIENT
Start: 2020-07-29 | End: 2020-08-03

## 2020-07-29 RX ORDER — CIPROFLOXACIN 500 MG/1
500 TABLET, FILM COATED ORAL EVERY 12 HOURS SCHEDULED
Status: DISCONTINUED | OUTPATIENT
Start: 2020-07-29 | End: 2020-07-29 | Stop reason: HOSPADM

## 2020-07-29 RX ORDER — CIPROFLOXACIN 500 MG/1
500 TABLET, FILM COATED ORAL EVERY 12 HOURS
Qty: 10 TABLET | Refills: 0 | Status: SHIPPED | OUTPATIENT
Start: 2020-07-29 | End: 2020-08-14

## 2020-07-29 RX ORDER — HEPARIN SODIUM (PORCINE) LOCK FLUSH IV SOLN 100 UNIT/ML 100 UNIT/ML
5 SOLUTION INTRAVENOUS
Status: DISCONTINUED | OUTPATIENT
Start: 2020-07-29 | End: 2020-07-29 | Stop reason: HOSPADM

## 2020-07-29 RX ORDER — METRONIDAZOLE 500 MG/1
500 TABLET ORAL 3 TIMES DAILY
Status: DISCONTINUED | OUTPATIENT
Start: 2020-07-29 | End: 2020-07-29 | Stop reason: HOSPADM

## 2020-07-29 RX ADMIN — METRONIDAZOLE 500 MG: 500 TABLET ORAL at 14:08

## 2020-07-29 RX ADMIN — POTASSIUM CHLORIDE 20 MEQ: 750 TABLET, EXTENDED RELEASE ORAL at 08:51

## 2020-07-29 RX ADMIN — POTASSIUM CHLORIDE 20 MEQ: 29.8 INJECTION, SOLUTION INTRAVENOUS at 05:07

## 2020-07-29 RX ADMIN — SODIUM CHLORIDE: 9 INJECTION, SOLUTION INTRAVENOUS at 02:51

## 2020-07-29 RX ADMIN — HYDROMORPHONE HYDROCHLORIDE 4 MG: 2 TABLET ORAL at 12:38

## 2020-07-29 RX ADMIN — PANTOPRAZOLE SODIUM 40 MG: 40 TABLET, DELAYED RELEASE ORAL at 08:51

## 2020-07-29 RX ADMIN — METRONIDAZOLE 500 MG: 500 TABLET ORAL at 08:51

## 2020-07-29 RX ADMIN — HYDROMORPHONE HYDROCHLORIDE 4 MG: 2 TABLET ORAL at 02:49

## 2020-07-29 RX ADMIN — SODIUM CHLORIDE, PRESERVATIVE FREE 5 ML: 5 INJECTION INTRAVENOUS at 12:41

## 2020-07-29 RX ADMIN — CITALOPRAM HYDROBROMIDE 20 MG: 20 TABLET, FILM COATED ORAL at 08:51

## 2020-07-29 RX ADMIN — MULTIPLE VITAMINS W/ MINERALS TAB 1 TABLET: TAB at 08:51

## 2020-07-29 RX ADMIN — POTASSIUM CHLORIDE 20 MEQ: 29.8 INJECTION, SOLUTION INTRAVENOUS at 03:46

## 2020-07-29 RX ADMIN — ONDANSETRON 4 MG: 4 TABLET, ORALLY DISINTEGRATING ORAL at 12:40

## 2020-07-29 RX ADMIN — SIMETHICONE 80 MG: 80 TABLET, CHEWABLE ORAL at 12:40

## 2020-07-29 RX ADMIN — PIPERACILLIN AND TAZOBACTAM 3.38 G: 3; .375 INJECTION, POWDER, FOR SOLUTION INTRAVENOUS at 02:51

## 2020-07-29 RX ADMIN — HYDROMORPHONE HYDROCHLORIDE 4 MG: 2 TABLET ORAL at 09:07

## 2020-07-29 RX ADMIN — CIPROFLOXACIN HYDROCHLORIDE 500 MG: 500 TABLET, FILM COATED ORAL at 09:41

## 2020-07-29 RX ADMIN — MAGNESIUM SULFATE IN WATER 4 G: 40 INJECTION, SOLUTION INTRAVENOUS at 09:10

## 2020-07-29 ASSESSMENT — ACTIVITIES OF DAILY LIVING (ADL)
ADLS_ACUITY_SCORE: 13

## 2020-07-29 ASSESSMENT — MIFFLIN-ST. JEOR: SCORE: 1545.73

## 2020-07-29 ASSESSMENT — PAIN DESCRIPTION - DESCRIPTORS
DESCRIPTORS: BURNING;PRESSURE
DESCRIPTORS: BURNING;SHARP

## 2020-07-29 NOTE — DISCHARGE SUMMARY
[facility], [place]  Hospitalist Discharge Summary      Date of Admission:  7/20/2020  Date of Discharge:  7/29/2020  3:12 PM  Discharging Provider: Amanda Dixon MD  Discharge Team: Hospitalist Service, [place]     Discharge Diagnoses   Cholangitis  Leukocytosis  Stage IIB ([id number]) adenocarcinoma of stomach   Malnutrition     Follow-ups Needed After Discharge   Follow-up Appointments     Adult [facility] Follow-up and recommended labs and tests      Follow up with primary care provider, Marianne Gonzalez, within 7 days for   hospital follow- up.  The following labs/tests are recommended: CMP and   CBC.      Appointments on [place] and/or [facility] (with [facility] or [facility]   provider or service). Call [phone] if you haven't heard regarding   these appointments within 7 days of discharge.             Unresulted Labs Ordered in the Past 30 Days of this Admission     Date and Time Order Name Status Description    7/21/2020 1240 Blood Morphology Pathologist Review In process           Discharge Disposition   Discharged to home  Condition at discharge: Stable    Hospital Course   Ms. Bashir is a 54 year old female with a past medical history of poorly differentiated gastric adenocarcinoma on chemotherapy (Taxotere, 5-Follow-up, oxaliplatinum), pancreatic cyst status post Whipple procedure (1/28/20) and cholecystectomy who was admitted on 7/20 with acute abdominal pain.    RUQ pain  Acute cholangitis with associated nausea, abdominal pain  3.2 x 4.5cm fluid collection in pelvis   CT abdomen and pelvis revealed thickening and enhancement of the bile duct proximal to the choledochojejunostomy with surrounding inflammation, consistent with cholangitis, 3.2x4.5cm fluid collection in the pelvis posterior to the uterus with subtle thickening and enhancement of the jejunum, sigmoid colon and proximal transverse colon. She was started on Flagyl and Levaquin, which was switched to zosyn and flagyl.  She underwent ERCP on 7/27 with biliary dilation. Also found to have small biliary erosion s/p biopsy that showed inflammation but no malignancy. Her pain was overall improving. Her antibiotic was switched to oral cipro and flagyl prior to discharge.  - complete a 10 day course of antibiotics.  - PPI for at least once a day for 60 days.    Persisting leukocytosis  Clinically stable, but WBC uptrended. Repeat CT was obtained without acute finding to explain the leukocytosis. This downtrended without significant intervetnion.  - need follow up CBC     Poorly differentiated gastric adenocarcinoma, HER-2 negative status post neoadjuvant FLOT (3/7/20) and cycle 7   Oxaliplatin related neuropathy, stable  Bilateral hand rash secondary to 5-Follow-up, improving  She has required a dose reduction of her oxaliplatin by 20% due to cold sensitivity/neuropathy with cycle #5. 5FU was decreased to 85% with cycle #6 due to mucositis/diarrhea/taste changes. Cycle 7 was initially held secondary to diarrhea, then given at her 7/15/20 visit. Medical Oncology was consult for co-management of chemotherapy related symptoms and management of cancer while hospitalized.  -Continue with hand and foot soaks twice daily as tolerated, Eucerin cream as often as needed to keep skin moisturized and hydrocortisone cream prn for irritation  - oncology follow up with be arranged    Asymptomatic pyuria  UA on admission showed WBC 57. Culture mixed yasmine    GERD  -Protonix 40mg twice daiyl and Famotidine 20mg twice daily   -TUMS prn for any epigastric pains  -Simethicone prn for any difficulties with gas      Consultations This Hospital Stay   GI LUMINAL ADULT IP CONSULT  ONCOLOGY ADULT IP CONSULT  MEDICATION HISTORY IP PHARMACY CONSULT  PHYSICAL THERAPY ADULT IP CONSULT  OCCUPATIONAL THERAPY ADULT IP CONSULT    Code Status   Full Code    Time Spent on this Encounter   Amanda GRIGGS MD, personally saw the patient today and spent greater than 30  minutes discharging this patient.       Amanad Dixon MD  [facility], [place]  ______________________________________________________________________    Physical Exam   Vital Signs: Temp: 98.8  F (37.1  C) Temp src: Temporal BP: 102/58 Pulse: 107   Resp: 18 SpO2: 99 % O2 Device: None (Room air)    Weight: 196 lbs 11.2 oz  General Appearance: Alert and oriented   Respiratory: unlabored, clear bilaterally  Cardiovascular: RRR  GI: soft mild tenderness over RUQ no rebound  Skin: no rash  Other: Stable mood       Primary Care Physician   Marianne Gonzalez    Discharge Orders      Reason for your hospital stay    You were admitted with abdominal pain. You underwent ERCP and dilation. Biopsy was taken which just showed inflammation. We repeated CT because your white blood cell count was increasing, but this did not show significant change other than local inflammation of intestine (could be due to infection [on antibioitic] or blood flow related). Since overall you are doing well, you may complete oral antibiotic at home to complete 10 day course.  Please follow up with oncology     Adult [facility] Follow-up and recommended labs and tests    Follow up with primary care provider, Marianne Gonzalez, within 7 days for hospital follow- up.  The following labs/tests are recommended: CMP and CBC.      Appointments on [place] and/or [facility] (with [facility] or [facility] provider or service). Call [phone] if you haven't heard regarding these appointments within 7 days of discharge.     Activity    Your activity upon discharge: activity as tolerated     When to contact your care team    Call your primary doctor if you have any of the following: temperature greater than 101, unable to tolerate po w nausea vomitting, worsening abdominal pain     Full Code     Diet    Follow this diet upon discharge: Orders Placed This Encounter      Regular Diet Adult       Significant Results and Procedures   Most Recent  3 CBC's:  Recent Labs   Lab Test 07/29/20  0242 07/28/20  0333 07/27/20  0327   WBC 22.4* 30.0* 22.0*   HGB 7.4* 8.6* 8.0*   MCV 90 90 90    247 225     Most Recent 3 BMP's:  Recent Labs   Lab Test 07/29/20  0920 07/29/20  0242 07/28/20  0333  07/27/20  0327   NA  --  140 142  --  140   POTASSIUM 3.4 3.2* 3.7  3.6   < > 3.3*   CHLORIDE  --  113* 115*  --  113*   CO2  --  23 20  --  24   BUN  --  2* 3*  --  3*   CR  --  0.89 0.91  --  0.84   ANIONGAP  --  5 6  --  4   PETRA  --  7.6* 8.1*  --  7.7*   GLC  --  74 112*  --  83    < > = values in this interval not displayed.     Most Recent 2 LFT's:  Recent Labs   Lab Test 07/28/20  0333 07/26/20  0320   AST 45 31   ALT 28 22   ALKPHOS 217* 175*   BILITOTAL 0.4 0.3   ,   Results for orders placed or performed during the hospital encounter of 07/20/20   CT Abdomen Pelvis w Contrast    Narrative    EXAMINATION: CT ABDOMEN PELVIS W CONTRAST, 7/20/2020 4:34 PM    TECHNIQUE:  Helical CT images from the lung bases through the  symphysis pubis were obtained with IV contrast. Contrast dose:  iopamidol (ISOVUE-370) solution 108 mL    COMPARISON: PET CT 6/3/2020. CT abdomen 4/13/2020, 3:30 1/20/2018,  2/10/2028, 2/4/2020.    HISTORY: Abd pain, acute, generalized    FINDINGS:    ABDOMEN:  LIVER: Heterogeneous attenuation of the liver parenchyma. No focal  hepatic lesion. Small amount of free fluid around the liver.  BILE DUCTS: No intrahepatic or extrahepatic biliary dilation. There is  mild enhancement of and inflammation surrounding the bile duct  proximal to the choledochojejunostomy, increased compared to prior  examinations.  GALLBLADDER: Surgically absent.  PANCREAS: Post surgical changes of Whipple procedure. Decreased main  pancreatic ductal dilation. Decreased inflammatory changes surrounding  the region of the pancreaticojejunostomy site.  SPLEEN: Within normal limits  ADRENALS: within normal limits.  KIDNEYS: No hydronephrosis. No nephrolithiasis. There is  thickening of  Gerota's fascia on the right.    PELVIS:   REPRODUCTIVE ORGANS: There is a fluid posterior right lateral of the  uterus measuring 3.2 x 4.5 cm, with slight thickened and enhancing  peritoneum along the periphery. Difficult to discern if this is simple  fluid collection/ascites as there is significant streak artifact from  left hip arthroplasty limiting evaluation.  URETERS: within normal limits.  BLADDER: Partially decompressed.    BOWEL: Postoperative changes from Whipple. No dilated loops of small  or large bowel.There is enhancement and is mild thickened appearance  of the jejunum, this may be secondary to lack of distention or could  be inflammatory in nature. Additionally, there is a thickened  appearance of the sigmoid and proximal transverse colon, this again  could be secondary to lack of full distention versus inflammatory  changes. Normal appendix. Sigmoid diverticulosis.  MESENTERIC LYMPH NODES: No enlarged mesenteric lymph nodes.   PERITONEUM: Small volume ascites particularly around the periphery of  the liver and within the pelvis. No free air within the abdomen or  pelvis. Nodular soft tissue thickening in the right inguinal region  (series 5, image 350) measuring 3.8 x 2.1 cm additional area of  nodularity measuring 2.2 x 2.0 cm (series 5, image 174) along the  right aspect of the abdominal wall at the level of the right kidney,  stable compared to prior.  VESSELS/lymph nodes: Pelvic phleboliths. The major vasculature of the  abdomen and pelvis is patent. Mild aortobiiliac atheromatous plaque.  No adenopathy.  RETROPERITONEUM: within normal limits.  ABDOMINAL WALL: Soft tissue thickening along the anterior midline  abdominal scar. Abdominal wall is otherwise within normal limits.    BONES: Postoperative changes of total left hip arthroplasty.Fixation  screw and fusion of the left sacroiliac joint. No suspicious osseous  lesions. Multilevel degenerative changes of visualized lumbar  spine.      LUNG BASES: Minimal left lower lobe groundglass opacity.      Impression    IMPRESSION:   1. Thickening and enhancement of the bile duct proximal to the  choledochojejunostomy with surrounding inflammatory change, slightly  increased, as can be seen with cholangitis.  2. Fluid with mild adjacent peritoneal thickening and enhancement in  the pelvis posterior to the uterus. This could represent small volume  ascites or a subtle sign of peritonitis.  3. Subtle thickening and enhancement of portions of the jejunum,  sigmoid colon, and proximal transverse colon, this may be secondary to  lack of full distention however or mild enterocolitis.  2. Stable soft tissue nodularity along the right abdominal wall and  right inguinal region, this is favored to be secondary to fat necrosis  from prior fluid collections presence but lack of activity on PET/CT.  3. Decreased main pancreatic ductal dilation.  4. Subtle hazy appearance of the mesentery, this is presumed to be  reactive in nature.  5. Mild left lower lobe groundglass opacity, nonspecific, infection  versus atelectasis.    I have personally reviewed the examination and initial interpretation  and I agree with the findings.    JUNE GODWIN MD   XR Surgery JC L/T 5 Min Fluoro    Narrative    This exam was marked as non-reportable because it will not be read by a   radiologist or a [place] non-radiologist provider.         CT Abdomen Pelvis w Contrast    Narrative    EXAMINATION: CT ABDOMEN PELVIS W CONTRAST, 7/28/2020 2:06 PM    TECHNIQUE:  Helical CT images from the lung bases through the  symphysis pubis were obtained with IV contrast. Contrast dose:  iopamidol (ISOVUE-370) solution 115 mL    COMPARISON: CT abdomen 7/20/2020    HISTORY: abd pain, s/p ERCP not impressive finding but WBC keeps  uptrending    FINDINGS:    Abdomen and pelvis: Postoperative changes of Whipple. There is mild  dilation and wall thickening of the pancreatic and biliary  limb of the  Billroth II reconstruction, with a transition point at the anastomosis  with the stomach. The alimentary limb of the Bilroth 2 reconstruction  is normal caliber. The cecum and left colon contains liquid stool.  Mild colonic diverticulosis, predominantly the sigmoid colon. Normal  appendix.    Small amount of central pneumobilia, which can be seen after  instrumentation or surgery. Mild enhancement of the wall of the common  bile duct, similar to the prior CT. Focal fatty infiltration near the  kd hepatis. Gallbladder is surgically absent. The portal vein,  superior mesenteric vein, and splenic vein are patent.    The spleen and adrenal glands are normal. Small amount of air in the  pancreatic duct, also likely related to the patient's Whipple/ERCP.    No hydronephrosis, cystic or solid renal mass. Urinary bladder is  decompressed. Portions of the pelvis are obscured by streak artifact  from patient's left total hip arthroplasty.    There are no enlarged lymph nodes by CT short axis size criteria. No  aortic aneurysm, or free air. Small amount of free fluid in the  pelvis. Unchanged soft tissue thickening in the area of the anterior  lower right abdominal wall (series 5 image 247) and measuring 1.7 cm.    Lung bases: Focal atelectasis or consolidation in the medial right  lower lobe. Patchy opacity in the lingula is unchanged.    Bones and soft tissues: Postoperative changes of left total hip  arthroplasty. Left sacroiliac fusion device with good bony fusion in  the central portion of the sacroiliac joint. Degenerative changes of  lumbar spine. Diffuse anasarca.      Impression    IMPRESSION:   1. The pancreatic biliary limb of the Billroth II is dilated and  thick-walled, with a transition point at the anastomosis with the  stomach. The alimentary limb of the Billroth II is relatively normal  caliber. Primary considerations are enteritis of the  pancreaticobiliary limb versus ileus. Obstruction seems  unlikely,  given the recent ERCP likely traversed this limb of small bowel,  including the anastomosis.     2. Unchanged mild enhancement of the common bile duct. Recommend  correlation with ERCP findings.    3. New small subsegmental atelectasis or consolidation in the medial  right lower lobe. Favor aspiration and/or atelectasis.    4. Liquid stool in the colon, with overall decreased diffuse colonic  wall thickening, most likely an improving colitis.    5. Increased anasarca, with unchanged small volume of ascites in the  pelvis.    6. Unchanged soft tissue thickening in the right inguinal area,  compatible with prior right inguinal hernia repair.    JUDD CHAO MD   XR Chest 2 Views    Narrative    Chest 2 views    INDICATION: Leukocytosis    COMPARISON: CT and plain radiographs dated 2020    FINDINGS: Improved aeration in the left lung base. Right IJ central  venous catheter tip is at the distal SVC. Heart size appears normal.  Bony structures appear grossly intact.      Impression    IMPRESSION: Improved aeration left lung base. This could represent  either overall decrease from previous process or interval clearing and  recurrence. Follow-through to complete clearing.    CODY VICTORIA MD   Echo Complete    Narrative    [id number]  [id number]  [id number]  [id number]^CYNDY^LEEANNA           [facility],[place]  Echocardiography Laboratory  [address] [place] [id number]     Name: TIKI ALONSO  MRN: [id number]  : 1965  Study Date: 2020 10:01 AM  Age: 54 yrs  Gender: Female  Patient Location: [facility]  Reason For Study: Tachycardia  Ordering Physician: LEEANNA NAYLOR  Referring Physician: DAYTON RODRIGUEZ  Performed By: Mckenna Nance RDCS     BSA: 1.9 m2  Height: 68 in  Weight: 174 lb  HR: 97  BP: 103/63 mmHg  _____________________________________________________________________________  __        Procedure  Complete Portable Echo  Adult.  _____________________________________________________________________________  __        Interpretation Summary  Global and regional left ventricular function is normal with an EF of 60-65%.  Right ventricular function, chamber size, wall motion, and thickness are  normal.  Pulmonary artery systolic pressure cannot be assessed.  The inferior vena cava is normal.  No pericardial effusion is present.  Previous study not available for comparison.  _____________________________________________________________________________  __        Left Ventricle  Global and regional left ventricular function is normal with an EF of 60-65%.  Left ventricular wall thickness is normal. Left ventricular size is normal.  Left ventricular diastolic function is normal. No regional wall motion  abnormalities are seen.     Right Ventricle  Right ventricular function, chamber size, wall motion, and thickness are  normal.     Atria  Both atria appear normal.     Mitral Valve  The mitral valve is normal.        Aortic Valve  Aortic valve is normal in structure and function.     Tricuspid Valve  The tricuspid valve is normal. Pulmonary artery systolic pressure cannot be  assessed.     Pulmonic Valve  The pulmonic valve is normal.     Vessels  The thoracic aorta is normal. The pulmonary artery and bifurcation cannot be  assessed. The inferior vena cava is normal.     Pericardium  No pericardial effusion is present.        Compared to Previous Study  Previous study not available for comparison.  _____________________________________________________________________________  __  MMode/2D Measurements & Calculations  asc Aorta Diam: 3.0 cm     LVOT diam: 2.2 cm  LVOT area: 3.8 cm2  LA Volume (BP): 39.8 ml  LA Volume Index (BP): 20.6 ml/m2        Doppler Measurements & Calculations  MV E max danny: 59.6 cm/sec  MV A max danny: 52.1 cm/sec  MV E/A: 1.1  MV dec slope: 476.0 cm/sec2  E/E' av.2  Lateral E/e': 4.2  Medial E/e': 6.2         _____________________________________________________________________________  __           Report approved by: Lizbeth Antoine 07/25/2020 10:51 AM            Discharge Medications   Current Discharge Medication List      START taking these medications    Details   ciprofloxacin (CIPRO) 500 MG tablet Take 1 tablet (500 mg) by mouth every 12 hours for 5 days  Qty: 10 tablet, Refills: 0    Associated Diagnoses: Cholangitis      metroNIDAZOLE (FLAGYL) 500 MG tablet Take 1 tablet (500 mg) by mouth 3 times daily for 5 days  Qty: 15 tablet, Refills: 0    Associated Diagnoses: Cholangitis         CONTINUE these medications which have CHANGED    Details   oxyCODONE (ROXICODONE) 5 MG tablet Take 1-2 tablets (5-10 mg) by mouth every 6 hours as needed for pain (. Stop tramadol. Cannot take with tramadol.)  Qty: 15 tablet, Refills: 0    Associated Diagnoses: Gastric adenocarcinoma (H)         CONTINUE these medications which have NOT CHANGED    Details   amitriptyline (ELAVIL) 25 MG tablet Take 3 tablets by mouth At Bedtime      citalopram (CELEXA) 20 MG tablet Take 20 mg by mouth every morning       diclofenac (FLECTOR) 1.3 % patch Place 1 patch onto the skin daily as needed       famotidine (PEPCID) 20 MG tablet Take 1 tablet (20 mg) by mouth 2 times daily  Qty: 60 tablet, Refills: 3    Associated Diagnoses: Other gastritis without hemorrhage, unspecified chronicity      loperamide (IMODIUM) 2 MG capsule Take 4 mg by mouth 4 times daily as needed for diarrhea       LORazepam (ATIVAN) 0.5 MG tablet Take 1 tablet (0.5 mg) by mouth every 4 hours as needed (Anxiety, Nausea/Vomiting or Sleep)  Qty: 30 tablet, Refills: 5    Associated Diagnoses: Gastric adenocarcinoma (H)      meclizine (ANTIVERT) 25 MG tablet Take 1 tablet (25 mg) by mouth 3 times daily as needed for dizziness  Qty: 60 tablet, Refills: 0    Associated Diagnoses: Gastric adenocarcinoma (H)      ondansetron (ZOFRAN) 8 MG tablet Take 1 tablet (8 mg) by mouth every  8 hours as needed (Nausea/Vomiting)  Qty: 90 tablet, Refills: 3    Associated Diagnoses: Gastric adenocarcinoma (H)      pantoprazole (PROTONIX) 40 MG EC tablet TAKE ONE TABLET BY MOUTH TWICE A DAY  Qty: 60 tablet, Refills: 1    Associated Diagnoses: IPMN (intraductal papillary mucinous neoplasm); Malignant neoplasm of pyloric antrum (H); Abdominal pain, epigastric      potassium chloride ER (KLOR-CON M) 20 MEQ CR tablet Take 1 tablet (20 mEq) by mouth daily  Qty: 30 tablet, Refills: 1    Associated Diagnoses: Hypokalemia      simethicone 80 MG TABS Take 1 tablet by mouth every 6 hours as needed (bloating, gas, belching)  Qty: 90 tablet, Refills: 3    Associated Diagnoses: Other acute gastritis without hemorrhage; Bloating      traMADol (ULTRAM) 50 MG tablet Take 50 mg by mouth every 6 hours as needed for severe pain      dexamethasone (DECADRON) 4 MG tablet Take 1 tablet (4mg) the day before, day of, and 2 days after chemotherapy  Qty: 12 tablet, Refills: 3    Associated Diagnoses: Gastric adenocarcinoma (H)      naloxone (NARCAN) 4 MG/0.1ML nasal spray Spray 1 spray (4 mg) into one nostril alternating nostrils once as needed for opioid reversal every 2-3 minutes until assistance arrives  Qty: 0.2 mL, Refills: 0    Associated Diagnoses: Gastric adenocarcinoma (H)         STOP taking these medications       multivitamin, therapeutic with minerals (MULTI-VITAMIN) TABS Comments:   Reason for Stopping:             Allergies   Allergies   Allergen Reactions     Gluten Meal Palpitations     Augmentin Rash     Patient tolerated Zosyn in 7/2020     Oxycontin [Oxycodone]      Confusion, loopy, oxycodone is tolerated     Pregabalin      interfered with Cymbalta     Topiramate      Tongue numbness, taste alteration, irritable      Acetaminophen Nausea     Urine retention       D[place] Extract      Gabapentin Dizziness and GI Disturbance     Latex Rash     Methadone Fatigue     Mold      Naprosyn [Naproxen] Dizziness and GI  Disturbance     Oxymetholone      Seasonal Allergies

## 2020-07-29 NOTE — PROGRESS NOTES
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  CT scan of the abdomen and pelvis obtained revealed thickening and enhancement of the bile duct proximal to the choledochojejunostomy with surrounding inflammation concerning for cholangitis.  Her liver tests are mildly abnormal including alkaline phosphatase that is increased from her baseline.  Her white blood cell count was 25,000 on admission but did just receive Neulasta 4 days prior to admission. This initially normalized but is on the rise again.    At this point it is still unclear if her symptoms are related to cholangitis despite CT and ERCP findings. She does not have any biliary dilation and liver tests have been relatively unremarkable. Underwent enteroscopy assisted ERCP 7/27 with no obvious e/o cholangitis but choledochojejunostomy was dilated to 6mm. The biliary limb did have nonspecific small erosions which were sampled (path very non-specific). CT from 7/28 with thick walled and dilated PB limb which could correspond to enteroscopy findings. Will plan to complete course of abx for POSSIBLE cholangitis, continue PPI and perform f/u enteroscopy in ~8 weeks. Agree with discharge today since leukocytosis is down trending and she is very stable.     Recommendations:  Complete course of abx  Continue bid PPI  Analgesia/Antiemetics per primary team  F/u EGD/enteroscopy in 8 weeks (we will arrange)  Discussed with primary team Dr. Dixon    The patient was discussed and plan agreed upon with GI staff, Dr Aviles.      Mirta Neri PA-C  Advanced Endoscopy/Pancreaticobiliary GI Service  [facility]  "[facility]  Pager *[phone]  Text Page  _______________________________________________________________      Subjective: Nursing notes and 24hr events reviewed. Patient seen and examined at 1200. Still c/o mild abdominal pain but overall improved. Happy to be discharging today. No fevers.    ROS:   4 pt ROS negative unless noted in subjective.     Objective:  Blood pressure 102/58, pulse 107, temperature 98.8  F (37.1  C), temperature source Temporal, resp. rate 18, height 1.727 m (5' 8\"), weight 89.2 kg (196 lb 11.2 oz), last menstrual period 09/23/2014, SpO2 99 %, not currently breastfeeding.  Gen: Sitting comfortably at bedside. NAD  HEENT: NCAT. Conjunctiva clear. Sclera anicteric  Resp: normal work of breathing  Msk: no gross deformity  Skin:  no jaundice  Neuro: grossly normal  Mental status/Psych: A&O. Asks/answers questions appropriately     LABS:  BMP  Recent Labs   Lab 07/29/20  0920 07/29/20 0242 07/28/20 0333 07/27/20 2025 07/27/20 0327 07/26/20 0320   NA  --  140 142  --   --  140  --  139   POTASSIUM 3.4 3.2* 3.7  3.6 4.0   < > 3.3*   < > 3.0*   CHLORIDE  --  113* 115*  --   --  113*  --  109   PETRA  --  7.6* 8.1*  --   --  7.7*  --  7.6*   CO2  --  23 20  --   --  24  --  25   BUN  --  2* 3*  --   --  3*  --  4*   CR  --  0.89 0.91  --   --  0.84  --  0.78   GLC  --  74 112*  --   --  83  --  82    < > = values in this interval not displayed.     CBC  Recent Labs   Lab 07/29/20  0242 07/28/20 0333 07/27/20 0327 07/26/20 0320   WBC 22.4* 30.0* 22.0* 16.2*   RBC 2.78* 3.13* 2.96* 2.84*   HGB 7.4* 8.6* 8.0* 7.6*   HCT 24.9* 28.1* 26.6* 25.5*   MCV 90 90 90 90   MCH 26.6 27.5 27.0 26.8   MCHC 29.7* 30.6* 30.1* 29.8*   RDW 24.8* 24.9* 24.2* 24.0*    247 225 170     INR  Recent Labs   Lab 07/27/20  1015   INR 1.32*     LFTs  Recent Labs   Lab 07/28/20  0333 07/26/20  0320 07/24/20  0333 07/23/20  0925   ALKPHOS 217* 175* 219* 229*   AST 45 31 50* 57*   ALT 28 22 34 38   BILITOTAL " 0.4 0.3 0.6 1.7*   PROTTOTAL 4.9* 4.4* 4.8* 5.0*   ALBUMIN 2.0* 1.8* 1.9* 2.0*      PANC  No lab results found in last 7 days.      IMAGING:  EXAMINATION: CT ABDOMEN PELVIS W CONTRAST, 7/28/2020 2:06 PM                                                                    IMPRESSION:   1. The pancreatic biliary limb of the Billroth II is dilated and  thick-walled, with a transition point at the anastomosis with the  stomach. The alimentary limb of the Billroth II is relatively normal  caliber. Primary considerations are enteritis of the  pancreaticobiliary limb versus ileus. Obstruction seems unlikely,  given the recent ERCP likely traversed this limb of small bowel,  including the anastomosis.      2. Unchanged mild enhancement of the common bile duct. Recommend  correlation with ERCP findings.     3. New small subsegmental atelectasis or consolidation in the medial  right lower lobe. Favor aspiration and/or atelectasis.     4. Liquid stool in the colon, with overall decreased diffuse colonic  wall thickening, most likely an improving colitis.     5. Increased anasarca, with unchanged small volume of ascites in the  pelvis.     6. Unchanged soft tissue thickening in the right inguinal area,  compatible with prior right inguinal hernia repair.

## 2020-07-29 NOTE — PROGRESS NOTES
Care Coordinator Progress Note    Admission Date/Time:  7/20/2020  Attending MD:  Amanda Dixon,*    Data  Chart reviewed, discussed with interdisciplinary team.   Patient was admitted for:    Cholangitis  Malignant neoplasm of stomach, unspecified location (H).    Concerns with insurance coverage for discharge needs: None.  Current Living Situation: Patient lives with spouse.  Support System: Supportive  Services Involved: None  Transportation at Discharge: Car and Family or friend will provide  Transportation to Medical Appointments:   - Not applicable  Barriers to Discharge: None     Assessment  D: Plan of care discussed with Medical Team at Interdisciplinary Rounds, plan for patient to discharge today.      I/A: Chart reviewed; spoke with patient via phone and introduced role. Writer confirmed home support, living arrangements and transportation. Patient had no discharge needs or concerns.    IMM was provided to patient and placed in patient chart.      P: No further RNCC needs at this time. Care Coordinator will remain available for discharge needs that may arise.    Plan  Anticipated Discharge Date:  7/29/20  Anticipated Discharge Plan:  Home    Nancy Cutler RN, A  Care Coordinator  Phone: [phone] Pager: [phone]  [facility]     To contact Weekend RNCC, page [phone]

## 2020-07-29 NOTE — PROGRESS NOTES
Hematology / Oncology  Daily Progress Note   Date of Service: 07/29/2020  Patient: Nathalie Bashir  MRN: [id number]  Admission Date: 7/20/2020  Hospital Day # 9  Cancer Diagnosis: Stage IIB (pT4aN0) adenocarcinoma of stomach   Primary Outpatient Oncologist: Dr. oPllard   Current Treatment Plan: FLOT (C7 7/15)     Recommendations:   - Ok to discharge per oncology team, follow up arranged   - Will have follow up labs (8/6) and GIOVANNY visit (8/7)     Assessment & Plan:   Nathalie Bashir is a 54 year old female with PMHx of signet ring-cell gastric adenocarcinoma, GERD, and depression who is admitted to medicine team on 7/20/20 with acute cholangitis. Oncology was consulted for management of gastric adenocarcinoma and chemotherapy-related symptoms.     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 current plan of treatment if to complete neoadjuvant intent FLOT for the total of 8 cycles before proceeding with surgery. Last chemotherapy session was 7/16/20 (7th cycle). There is no evidence of progression on CT obtained on admission. Small amount of ascites, not amenable for sampling. She experienced worsening neuropathy, rashes/mucositis as adverse effects from chemotherapy. 5-Fluorouracil and Oxaliplatin dose decreased. Now improved with mild residual symptoms.  - Oncology clinic follow-up with labs 8/6 and GIOVANNY visit 8/7.      # Malnutrition secondary to inadequate protein-energy intake   Albumin 1.8 g/dL. Decreased oral intake mainly from abdominal pain and nausea and is having ongoing weight loss. Had recent visit with dietitian and goals were set to aim for 2000kcal and 85g protein/day with weight maintenance.  - Continue follow-up with dietitian outpatient     # RUQ abdominal pain - controlled   # Leukocytosis - improving   # Cholangitis   Worsening RUQ  "abdominal pain with the absence of fever or jaundice. with CT findings suspicion for cholangitis with marginally elevated ALP. It is unclear if her symptoms are related to cholangitis in the absence of biliary dilation. She underwent ERCP and had dilation of her main bile duct and HJ. There were small erosions noted during ERCP, these were biopsied and sent for pathology.  - She completed a 5 day course of IV Zosyn while in the hospital and will discharge with Cipro/Flagyl.   - Controled with PO pain regimen.   - Strongly encouraged the patient to keep an eye on her abdominal pain and if it worsens or changes she should call the clinic triage line.     Patient was seen and plan of care was discussed with attending physician Dr. Acuna.     Thank you for the opportunity to partake in this patients plan of care. Please do not hesitate to page with questions. We will sign off as she will be discharging today.     Chelly Ace PA-C   Hematology/Oncology   Pager: [phone]   ___________________________________________________________________    Subjective & Interval History:    No acute events noted overnight. Nathalie thinks her pain is doing well. She continues to feel a little sore but she has been moving around a bit today. She is excited to get out of the hospital. Discussed her follow up planned for the clinic and she voiced understanding. Also emphasized that if she is experiencing increased pain or any other concerning symptoms she should call the clinic triage line. She understood.     Physical Exam:    Blood pressure 102/58, pulse 107, temperature 98.8  F (37.1  C), temperature source Temporal, resp. rate 18, height 1.727 m (5' 8\"), weight 89.2 kg (196 lb 11.2 oz), last menstrual period 09/23/2014, SpO2 99 %, not currently breastfeeding.    Patient was not physically examined today. She appears comfortable laying in bed.     Labs & Studies: I personally reviewed the following studies:  ROUTINE LABS (Last four " results):  CMP  Recent Labs   Lab 07/29/20  1255 07/29/20  0920 07/29/20  0242 07/28/20 0333 07/27/20 2025 07/27/20 0327 07/26/20 0320 07/24/20 0333 07/23/20 0925   NA  --   --  140 142  --   --  140  --  139   < > 137 139   POTASSIUM  --  3.4 3.2* 3.7  3.6 4.0   < > 3.3*   < > 3.0*   < > 3.4 3.4   CHLORIDE  --   --  113* 115*  --   --  113*  --  109   < > 105 105   CO2  --   --  23 20  --   --  24  --  25   < > 29 29   ANIONGAP  --   --  5 6  --   --  4  --  6   < > 4 4   GLC  --   --  74 112*  --   --  83  --  82   < > 108* 95   BUN  --   --  2* 3*  --   --  3*  --  4*   < > 3* 3*   CR  --   --  0.89 0.91  --   --  0.84  --  0.78   < > 0.79 0.76   GFRESTIMATED  --   --  73 72  --   --  78  --  86   < > 85 88   GFRESTBLACK  --   --  85 83  --   --  >90  --  >90   < > >90 >90   PETRA  --   --  7.6* 8.1*  --   --  7.7*  --  7.6*   < > 8.0* 7.9*   MAG 2.6*  --  1.6  --   --   --   --   --   --   --   --   --    PROTTOTAL  --   --   --  4.9*  --   --   --   --  4.4*  --  4.8* 5.0*   ALBUMIN  --   --   --  2.0*  --   --   --   --  1.8*  --  1.9* 2.0*   BILITOTAL  --   --   --  0.4  --   --   --   --  0.3  --  0.6 1.7*   ALKPHOS  --   --   --  217*  --   --   --   --  175*  --  219* 229*   AST  --   --   --  45  --   --   --   --  31  --  50* 57*   ALT  --   --   --  28  --   --   --   --  22  --  34 38    < > = values in this interval not displayed.     CBC  Recent Labs   Lab 07/29/20  0242 07/28/20  0333 07/27/20  0327 07/26/20  0320   WBC 22.4* 30.0* 22.0* 16.2*   RBC 2.78* 3.13* 2.96* 2.84*   HGB 7.4* 8.6* 8.0* 7.6*   HCT 24.9* 28.1* 26.6* 25.5*   MCV 90 90 90 90   MCH 26.6 27.5 27.0 26.8   MCHC 29.7* 30.6* 30.1* 29.8*   RDW 24.8* 24.9* 24.2* 24.0*    247 225 170     INR  Recent Labs   Lab 07/27/20  1015   INR 1.32*       Medications list for reference:  Current Facility-Administered Medications   Medication     amitriptyline (ELAVIL) tablet 75 mg     calcium carbonate (TUMS) chewable tablet 1,000 mg      ciprofloxacin (CIPRO) tablet 500 mg     citalopram (celeXA) tablet 20 mg     heparin 100 UNIT/ML injection 5 mL     heparin 100 UNIT/ML injection 5 mL     heparin lock flush 10 UNIT/ML injection 5-10 mL     heparin lock flush 10 UNIT/ML injection 5-10 mL     HYDROmorphone (DILAUDID) tablet 2-4 mg     HYDROmorphone (PF) (DILAUDID) injection 1 mg     lidocaine (LMX4) cream     lidocaine 1 % 0.1-1 mL     loperamide (IMODIUM) capsule 2 mg     LORazepam (ATIVAN) tablet 0.5 mg     magnesium sulfate 4 g in 100 mL sterile water (premade)     melatonin tablet 1 mg     metroNIDAZOLE (FLAGYL) tablet 500 mg     multivitamin w/minerals (THERA-VIT-M) tablet 1 tablet     naloxone (NARCAN) injection 0.1-0.4 mg     ondansetron (ZOFRAN-ODT) ODT tab 4 mg    Or     ondansetron (ZOFRAN) injection 4 mg     pantoprazole (PROTONIX) EC tablet 40 mg     potassium chloride (KLOR-CON) Packet 20-40 mEq     potassium chloride 10 mEq in 100 mL intermittent infusion with 10 mg lidocaine     potassium chloride 10 mEq in 100 mL sterile water intermittent infusion (premix)     potassium chloride 20 mEq in 50 mL intermittent infusion     potassium chloride ER (KLOR-CON M) CR tablet 20 mEq     potassium chloride ER (KLOR-CON M) CR tablet 20-40 mEq     potassium phosphate 15 mmol in D5W 250 mL intermittent infusion     potassium phosphate 20 mmol in D5W 250 mL intermittent infusion     potassium phosphate 20 mmol in D5W 500 mL intermittent infusion     potassium phosphate 25 mmol in D5W 500 mL intermittent infusion     prochlorperazine (COMPAZINE) injection 10 mg    Or     prochlorperazine (COMPAZINE) tablet 10 mg    Or     prochlorperazine (COMPAZINE) Suppository 25 mg     simethicone (MYLICON) chewable tablet 80 mg     sodium chloride (PF) 0.9% PF flush 10-20 mL     sodium chloride (PF) 0.9% PF flush 10-20 mL     sodium chloride (PF) 0.9% PF flush 3 mL     sodium chloride (PF) 0.9% PF flush 3 mL     sodium chloride 0.9% infusion

## 2020-07-29 NOTE — PLAN OF CARE
"1900-0730: /63 (BP Location: Left arm)   Pulse 98   Temp 98.8  F (37.1  C) (Temporal)   Resp 18   Ht 1.727 m (5' 8\")   Wt 85.6 kg (188 lb 12.8 oz)   LMP 09/23/2014   SpO2 99%   BMI 28.71 kg/m      Patient afebrile. C/o RUQ sharp burning abdominal pain last evening worsened with burping and getting up to use bathroom, pt requested and given IV dilaudid x 1 with good pain control. Dilaudid po x 1 overnight. Potassium replaced. Potassium re-check ordered for 0800. IVF NS at 100 ml/hr infusing through portacath. Continue to monitor and plan of car.      Problem: Adult Inpatient Plan of Care  Goal: Plan of Care Review  7/29/2020 0641 by Crissy Mallory, RN  Outcome: No Change     Problem: Cardiac Disease Comorbidity  Goal: Cardiac Disease  Description: Patient comorbidity will be monitored for signs and symptoms of Cardiac Disease.  Problems will be absent, minimized or managed by discharge/transition of care.  7/29/2020 0641 by Crissy Mallory, RN  Outcome: No Change     Problem: Gastrointestinal Condition Comorbidity  Goal: Gastrointestinal Condition  Description: Patient comorbidity will be monitored for signs and symptoms of Gastrointestinal condition.  Problems will be absent, minimized or managed by discharge/transition of care.  7/29/2020 0641 by Crissy Mallory, RN  Outcome: No Change     Problem: Pain Chronic (Persistent) (Comorbidity Management)  Goal: Acceptable Pain Control and Functional Ability  7/29/2020 0641 by Crissy Mallory, RN  Outcome: No Change     Problem: Pain Acute  Goal: Optimal Pain Control  7/29/2020 0641 by Crissy Mallory, RN  Outcome: No Change     "

## 2020-07-29 NOTE — PLAN OF CARE
"/58 (BP Location: Left arm)   Pulse 107   Temp 98.8  F (37.1  C) (Temporal)   Resp 18   Ht 1.727 m (5' 8\")   Wt 89.2 kg (196 lb 11.2 oz)   LMP 09/23/2014   SpO2 99%   BMI 29.91 kg/m       Neuro: A/Ox4.   Cardiac: VSS. Afebrile  Respiratory: RA. CXR completed  GI/: voiding without difficulty. BMx1  Diet/Appetite: regular diet, good appetite.   Skin: no new skin deficits noted  LDA: Port de-accessed  Activity: Up ad nelida with cane  Pain: acceptable with current regimen. PO dilaudid given x2  Plan: Pt adequate for discharge. Discharge paperwork went over with pt. All questions were answered. Pt to finish up a course of PO antibiotics and follow up in clinic. Port was de-accessed. Pt left unit with transport at 1505. Her  is picking her up    "

## 2020-07-30 ENCOUNTER — PATIENT OUTREACH (OUTPATIENT)
Dept: GASTROENTEROLOGY | Facility: CLINIC | Age: 55
End: 2020-07-30

## 2020-08-03 ENCOUNTER — PATIENT OUTREACH (OUTPATIENT)
Dept: GASTROENTEROLOGY | Facility: CLINIC | Age: 55
End: 2020-08-03

## 2020-08-03 ENCOUNTER — TELEPHONE (OUTPATIENT)
Dept: ONCOLOGY | Facility: CLINIC | Age: 55
End: 2020-08-03

## 2020-08-03 ENCOUNTER — PREP FOR PROCEDURE (OUTPATIENT)
Dept: GASTROENTEROLOGY | Facility: CLINIC | Age: 55
End: 2020-08-03

## 2020-08-03 DIAGNOSIS — K52.9 JEJUNITIS: Primary | ICD-10-CM

## 2020-08-03 DIAGNOSIS — C16.9 GASTRIC ADENOCARCINOMA (H): ICD-10-CM

## 2020-08-03 RX ORDER — OXYCODONE HYDROCHLORIDE 5 MG/1
5-10 TABLET ORAL EVERY 6 HOURS PRN
Qty: 15 TABLET | Refills: 0 | Status: SHIPPED | OUTPATIENT
Start: 2020-08-03 | End: 2020-08-06

## 2020-08-03 NOTE — TELEPHONE ENCOUNTER
Per inpatient team:    Procedure/Appointment/Imaging: EGD/enteroscopy   Physician: Traci   Timin weeks   Procedure length: 60   Anesthesia: general   Dx: Jejunitis   Location: [facility]   Tier: 3     Pain on right side today, noted call into Oncology r/t pain medication.    Orders placed, letter routed.    Sofia George RN Care Coordinator

## 2020-08-03 NOTE — TELEPHONE ENCOUNTER
"Pt called in to triage requesting refill of Oxy 5 mg, she received #15 tablets at discharge 7/29, had endoscopic tretgrograde cgholangiopancreatography (acute cholangitis). Finishing antibiotics today, rates pain as 7/10 was taking 5 mg oxy every 8 hours, not using any other pain meds, state tylenol upsets her stomach and ibuprofen is not helpful. Last oxy was yesterday at 1 pm. Eating and drinking ok, denied any n/v/d, urinary or bowel issues. State she feels swollen around RUQ \"where my whipple was done\". Stated she was not using anything for pain prior to admission. Using protonix and famotidine twice daily.    Discharge instructions stated to follow-up with pcp in 7 days with cbc and cmp, pt stated she was unaware, informed her to call now and see if she can get in. Has follow-up virtual visit with Roxana PICKERING on 8/7 labs 8/6. Uses [facility]. Paged Roxana. Per Roxana valverde to refill Oxy, sent to [facility]. Pt may wait for labs this Thurs if not having issues. Relayed info to pt who verbalized understanding.  "

## 2020-08-03 NOTE — LETTER
August 3, 2020    Nathalie Bashir                              [address] [address]    Dear Nathalie-     You will be scheduled for an enteroscopy assisted ERCP with Dr. Aviles. To make sure we can visualize necessary structures please complete the following bowel prep prior to your procedure:    - 24 hours prior to procedure, consume only clear liquids.    - take 2 doses (34 grams) of Miralax, the night before your procedure    - You can continue to consume clear liquids until 2 hours prior to your procedure.    Please contact our office with any questions or concerns about this procedure.    Sofia George, REBECCA Care Coordinator  Dr. Sommer, Dr. Aviles & Dr. Stovall  [facility]  [phone]

## 2020-08-06 ENCOUNTER — TELEPHONE (OUTPATIENT)
Dept: ONCOLOGY | Facility: CLINIC | Age: 55
End: 2020-08-06

## 2020-08-06 VITALS
HEART RATE: 102 BPM | RESPIRATION RATE: 16 BRPM | OXYGEN SATURATION: 98 % | TEMPERATURE: 97.5 F | SYSTOLIC BLOOD PRESSURE: 105 MMHG | WEIGHT: 192.2 LBS | DIASTOLIC BLOOD PRESSURE: 61 MMHG | BODY MASS INDEX: 29.22 KG/M2

## 2020-08-06 DIAGNOSIS — C16.9 GASTRIC ADENOCARCINOMA (H): ICD-10-CM

## 2020-08-06 LAB
ALBUMIN SERPL-MCNC: 2.1 G/DL (ref 3.4–5)
ALP SERPL-CCNC: 164 U/L (ref 40–150)
ALT SERPL W P-5'-P-CCNC: 23 U/L (ref 0–50)
ANION GAP SERPL CALCULATED.3IONS-SCNC: 5 MMOL/L (ref 3–14)
AST SERPL W P-5'-P-CCNC: 44 U/L (ref 0–45)
BASOPHILS # BLD AUTO: 0.1 10E9/L (ref 0–0.2)
BASOPHILS NFR BLD AUTO: 1.1 %
BILIRUB SERPL-MCNC: 0.4 MG/DL (ref 0.2–1.3)
BUN SERPL-MCNC: 3 MG/DL (ref 7–30)
CALCIUM SERPL-MCNC: 7.7 MG/DL (ref 8.5–10.1)
CHLORIDE SERPL-SCNC: 113 MMOL/L (ref 94–109)
CO2 SERPL-SCNC: 24 MMOL/L (ref 20–32)
CREAT SERPL-MCNC: 0.76 MG/DL (ref 0.52–1.04)
DIFFERENTIAL METHOD BLD: ABNORMAL
EOSINOPHIL # BLD AUTO: 0.2 10E9/L (ref 0–0.7)
EOSINOPHIL NFR BLD AUTO: 2 %
ERYTHROCYTE [DISTWIDTH] IN BLOOD BY AUTOMATED COUNT: 25.4 % (ref 10–15)
GFR SERPL CREATININE-BSD FRML MDRD: 90 ML/MIN/{1.73_M2}
GLUCOSE SERPL-MCNC: 93 MG/DL (ref 70–99)
HCT VFR BLD AUTO: 29.5 % (ref 35–47)
HGB BLD-MCNC: 9.2 G/DL (ref 11.7–15.7)
IMM GRANULOCYTES # BLD: 0.1 10E9/L (ref 0–0.4)
IMM GRANULOCYTES NFR BLD: 0.7 %
LYMPHOCYTES # BLD AUTO: 2 10E9/L (ref 0.8–5.3)
LYMPHOCYTES NFR BLD AUTO: 22.2 %
MCH RBC QN AUTO: 27.8 PG (ref 26.5–33)
MCHC RBC AUTO-ENTMCNC: 31.2 G/DL (ref 31.5–36.5)
MCV RBC AUTO: 89 FL (ref 78–100)
MONOCYTES # BLD AUTO: 0.9 10E9/L (ref 0–1.3)
MONOCYTES NFR BLD AUTO: 10.3 %
NEUTROPHILS # BLD AUTO: 5.9 10E9/L (ref 1.6–8.3)
NEUTROPHILS NFR BLD AUTO: 63.7 %
NRBC # BLD AUTO: 0 10*3/UL
NRBC BLD AUTO-RTO: 0 /100
PLATELET # BLD AUTO: 328 10E9/L (ref 150–450)
POTASSIUM SERPL-SCNC: 4 MMOL/L (ref 3.4–5.3)
PROT SERPL-MCNC: 5.1 G/DL (ref 6.8–8.8)
RBC # BLD AUTO: 3.31 10E12/L (ref 3.8–5.2)
SODIUM SERPL-SCNC: 142 MMOL/L (ref 133–144)
WBC # BLD AUTO: 9.2 10E9/L (ref 4–11)

## 2020-08-06 PROCEDURE — 85025 COMPLETE CBC W/AUTO DIFF WBC: CPT | Performed by: PHYSICIAN ASSISTANT

## 2020-08-06 PROCEDURE — 25000128 H RX IP 250 OP 636: Performed by: PHYSICIAN ASSISTANT

## 2020-08-06 PROCEDURE — 80053 COMPREHEN METABOLIC PANEL: CPT | Performed by: PHYSICIAN ASSISTANT

## 2020-08-06 RX ORDER — HEPARIN SODIUM (PORCINE) LOCK FLUSH IV SOLN 100 UNIT/ML 100 UNIT/ML
5 SOLUTION INTRAVENOUS DAILY PRN
Status: DISCONTINUED | OUTPATIENT
Start: 2020-08-06 | End: 2020-08-14 | Stop reason: HOSPADM

## 2020-08-06 RX ORDER — OXYCODONE HYDROCHLORIDE 5 MG/1
5-10 TABLET ORAL EVERY 6 HOURS PRN
Qty: 15 TABLET | Refills: 0 | Status: SHIPPED | OUTPATIENT
Start: 2020-08-06 | End: 2020-08-07

## 2020-08-06 RX ADMIN — Medication 5 ML: at 11:43

## 2020-08-06 ASSESSMENT — PAIN SCALES - GENERAL: PAINLEVEL: EXTREME PAIN (8)

## 2020-08-06 NOTE — TELEPHONE ENCOUNTER
Central Prior Authorization Team   Phone: [phone]      PA Initiation    Medication: Oxycodone HCL 5MG TABS-PA initiated  Insurance Company: Rika Moeller - Phone [phone] Fax [phone]  Pharmacy Filling the Rx: [facility] [place], [place] - [address] [place] [address] [phone]  Filling Pharmacy Phone: [phone]  Filling Pharmacy Fax:    Start Date: 8/6/2020

## 2020-08-06 NOTE — TELEPHONE ENCOUNTER
Pt has persistent pain in RUQ (where her whipple was done) since hospitalization for acute cholangitis. Rates her pain a 7/10. She is currently taking oxycodone 5mg 1-2 tabs every 6 hours. It does provided relief for her. She has one tab left and is saving it to take today when she comes in for lab appt today. Is requesting refill to [facility] pharmacy. Pt is due to see Roxana Sweet PA-C tmrw

## 2020-08-06 NOTE — TELEPHONE ENCOUNTER
[facility] Prior Authorization Team Request    Medication: Oxycodone HCL 5MG TABS  Dosing:   NDC (required for Medicaid members):     Insurance   BIN: [id number]  PCN: [id number]  Grp: [id number]  ID: [id number]    CoverMyMeds Key (if applicable):     Additional documentation:     Filling Pharmacy: [facility]   Phone Number: [phone]  Contact: [employer] PA ([id number]) please send all responses to this pool.  Pharmacy NPI (required for Medicaid members):

## 2020-08-06 NOTE — NURSING NOTE
Chief Complaint   Patient presents with     Port Draw     Labs drawn via port by RN in lab. VS taken.      Labs drawn via Port accessed using 20g gripper needle. Line flushed and Heparin locked. Vital signs taken.    Crissy Orr RN

## 2020-08-07 ENCOUNTER — VIRTUAL VISIT (OUTPATIENT)
Dept: ONCOLOGY | Facility: CLINIC | Age: 55
End: 2020-08-07
Attending: PHYSICIAN ASSISTANT
Payer: MEDICARE

## 2020-08-07 DIAGNOSIS — C16.9 GASTRIC ADENOCARCINOMA (H): Primary | ICD-10-CM

## 2020-08-07 DIAGNOSIS — J81.0 ACUTE EDEMA OF LUNG (H): ICD-10-CM

## 2020-08-07 PROCEDURE — 40001009 ZZH VIDEO/TELEPHONE VISIT; NO CHARGE

## 2020-08-07 PROCEDURE — 99214 OFFICE O/P EST MOD 30 MIN: CPT | Mod: 95 | Performed by: PHYSICIAN ASSISTANT

## 2020-08-07 RX ORDER — FUROSEMIDE 20 MG
20 TABLET ORAL DAILY
Qty: 0.5 TABLET | Refills: 0 | Status: SHIPPED | OUTPATIENT
Start: 2020-08-07 | End: 2020-08-14

## 2020-08-07 RX ORDER — OXYCODONE HYDROCHLORIDE 5 MG/1
5-10 TABLET ORAL EVERY 6 HOURS PRN
Qty: 60 TABLET | Refills: 0 | Status: SHIPPED | OUTPATIENT
Start: 2020-08-07 | End: 2020-08-21

## 2020-08-07 NOTE — TELEPHONE ENCOUNTER
Prior Authorization Approval    Authorization Effective Date: 5/8/2020  Authorization Expiration Date: 8/6/2021  Medication: Oxycodone HCL 5MG TABS-PA approved  Approved Dose/Quantity:   Reference #:     Insurance Company: Rika Moeller - Phone [phone] Fax [phone]  Expected CoPay:       CoPay Card Available:      Foundation Assistance Needed:    Which Pharmacy is filling the prescription (Not needed for infusion/clinic administered): [facility] [place], [place] - [address] [phone]  Pharmacy Notified: Yes  Patient Notified: No-Patient has picked up

## 2020-08-07 NOTE — LETTER
"    8/7/2020         RE: Nathalie Bashir  [address] [facility] [address]        Dear Colleague,    Thank you for referring your patient, Nathalie Bashir, to the [facility]. Please see a copy of my visit note below.        Vitals - Patient Reported  Weight (Patient Reported): 87.1 kg (192 lb)  Height (Patient Reported): 172.7 cm (5' 7.99\")  BMI (Based on Pt Reported Ht/Wt): 29.2  Pain Score: Extreme Pain (8)  Pain Loc: Other - see comment(under right rib, liver)    Concerned about excess fluid on legs, ankles, and feet.    Pain 8/10 - under right rib, liver    Christian Harper LPN    Video-Visit Details    Type of service:  Video Visit    Video Start Time: 1247  Video End Time: 1316    Originating Location (pt. Location): Home    Distant Location (provider location):  [facility]     Platform used for Video Visit: Miguel A Giordano CNP      Oncology/Hematology Visit Note  Aug 7, 2020    Reason for Visit: Follow up of gastric adenocarcinoma.    History of Present Illness:   Ms. Bashir is a 54 year old female with a history of gastric adenocarcinoma.  She was being followed for a pancreatic duct dilatation and pancreatic cyst which was noted in November 2018.  Since May 2019 she started noticing some nausea and vomiting and occasional abdominal discomfort.  She had an ultrasound in August 2019 which was essentially unremarkable.  She had a repeat endoscopic ultrasound on 10/29/2019 which showed increase in size of the cyst as well as dilation of the main pancreatic duct.  FNA and fluid analysis showed mucinous epithelium.  She was referred to Dr. 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  Surprisingly there was no pancreatic ductal neoplasm seen but on the resected specimen stomach cancer was noted.  It was poorly differentiated adenocarcinoma with poorly cohesive/signet ring cell type with proximal gastric mucosal and serosal " margins being positive.  There was lymphovascular and perineural invasion seen.  22 lymph nodes were sampled and all were benign.  It was a pT4aN0 lesion.  HER-2/christine FISH was not amplified.         EUS on 3/3/2020 without clear evidence of neoplasm endoscopically. Left gastric lymph node fine-needle aspiration was negative for carcinoma. Biopsy from the gastric jejunal anastomosis as well as lesser curvature of the stomach also did not show any malignancy.     She had a PET/CT on 3/13/2020 which showed soft tissue streaky density with increased FDG uptake adjacent to the pancreaticojejunostomy which could be neoplastic in origin.  There is mild increased FDG uptake in the gastric remnant.  Focal area of soft tissue thickening with fatty streakiness along medial laparotomy with increased FDG uptake which likely is inflammatory. Increased FDG uptake in thoracic esophagus which could be esophagitis.      She started on chemotherapy with 5FU, oxaliplatin, and Taxotere on 3/27/20. She was seen on 4/7/20 for evaluation of fevers. She was treated for possible pneumonia with Levaquin.      She was seen in clinic 4/13/20 for RUQ pain,CT and labs reassuring. She received cycles 2 and 3 of chemotherapy on 4/20/20 and 5/4/20. She was admitted from 5/21-5/23/20 with neutropenic aspiration pneumonia. She received cycle 4 on 5/27/20 with Neulasta support. PET/CT on 6/3/20 showed mild residual uptake at the site of gastrojejunal anastomosis which likely is physiologic.  There is almost resolution of soft tissue nodule next to the pancreaticoduodenal anastomosis without FDG uptake.  There is focal increased uptake in the left posterior acetabulum which could be artifact.     Decreased dose of oxaliplatin due to neuropathy/cold sensitivity on 6/11. Decreased 5FU to 85% with cycle #6 due to mucositis/diarrhea/taste changes. Treatment was held 7/9/20 due to diarrhea, dehydration, tachycardia, rash, and weight loss and she got cycle 7  "on 7/15. On 7/20 she presented to the ED with RUQ abdominal pain, and was admitted 7/20-7/29 for acute cholangitis     She is here for hospital follow up and consideration of delayed cycle 8 of FLOT.    Interval History:  -Pt reports she is doing okay after hospitalization. Finished oral abx. Reports she feels \"Im still full of fluid though\". Reports she has had BLE since she received IV fluids inpatient. Reports she has tried compression stockings and elevation. States she was 192 lbs yesterday at lab and is usually around 177 lbs. Reports she is diuretic naive.     -Pain is her main compliant. Reports pain is in RUQ, describes pain as \"tight\" and \"sore\". Reports pain has improved since her inpatient stay. She is currently taking Oxycodone 5 mg tablets, 2 tablets, every 6 hours. Is waking up overnight for pain medications. Reports supine position makes pain better, meals do not make the pain worse.     -Reports ongoing neuropathy. Feels this is worst in her feet but stable. Describes as numbness, denies pain. Finger neuropathy is improving.     -Other sx related to treatment are: cold sensitivity on lips 1-2 days after treatment, nausea improved by Zofran, and peeling of her hands and feet.     -Pt expresses she feels ready for her next treatment and excited it is her last!    Denies mucositis, fever, chills, cough, dyspnea, vision changes, chest pain, diarrhea, constipation, vomiting, bowel or bladder concerns, fatigue, sleep problems, mood changes, lumps/bumps, bleeding, and anorexia     Review of Systems:    -Constitutinal: denies fevers, chills, fatigue, and weight changes   -Skin: reports dry/peeling of skin on bilateral hands and feet. Denies rashes, petechiae, bruising, open wounds  -HEENT:  Denies mucositis and dry mouth  -Resp: Denies cough and dyspnea  -Cardiac: reports BLE. Denies chest pain and palpitation  -GI: reports nausea relieved by zofran. Denies constipation, diarrhea, and vomiting  -: denies " hematuria and dysuria  -Neuro: denies headaches, vision changes. Reports baseline neuropathy  -Psych: reports she is doing well and her 5 grandchildren are keeping her busy      Current Outpatient Medications   Medication Sig Dispense Refill     amitriptyline (ELAVIL) 25 MG tablet Take 3 tablets by mouth At Bedtime       citalopram (CELEXA) 20 MG tablet Take 20 mg by mouth every morning        dexamethasone (DECADRON) 4 MG tablet Take 1 tablet (4mg) the day before, day of, and 2 days after chemotherapy 12 tablet 3     diclofenac (FLECTOR) 1.3 % patch Place 1 patch onto the skin daily as needed        famotidine (PEPCID) 20 MG tablet Take 1 tablet (20 mg) by mouth 2 times daily 60 tablet 3     loperamide (IMODIUM) 2 MG capsule Take 4 mg by mouth 4 times daily as needed for diarrhea        LORazepam (ATIVAN) 0.5 MG tablet Take 1 tablet (0.5 mg) by mouth every 4 hours as needed (Anxiety, Nausea/Vomiting or Sleep) 30 tablet 5     meclizine (ANTIVERT) 25 MG tablet Take 1 tablet (25 mg) by mouth 3 times daily as needed for dizziness 60 tablet 0     ondansetron (ZOFRAN) 8 MG tablet Take 1 tablet (8 mg) by mouth every 8 hours as needed (Nausea/Vomiting) 90 tablet 3     oxyCODONE (ROXICODONE) 5 MG tablet Take 1-2 tablets (5-10 mg) by mouth every 6 hours as needed for pain (. Stop tramadol. Cannot take with tramadol.) 15 tablet 0     pantoprazole (PROTONIX) 40 MG EC tablet TAKE ONE TABLET BY MOUTH TWICE A DAY 60 tablet 1     potassium chloride ER (KLOR-CON M) 20 MEQ CR tablet Take 1 tablet (20 mEq) by mouth daily 30 tablet 1     simethicone 80 MG TABS Take 1 tablet by mouth every 6 hours as needed (bloating, gas, belching) 90 tablet 3     traMADol (ULTRAM) 50 MG tablet Take 50 mg by mouth every 6 hours as needed for severe pain       naloxone (NARCAN) 4 MG/0.1ML nasal spray Spray 1 spray (4 mg) into one nostril alternating nostrils once as needed for opioid reversal every 2-3 minutes until assistance arrives (Patient not  taking: Reported on 8/7/2020) 0.2 mL 0       Physical Examination:    LMP 09/23/2014   Wt Readings from Last 10 Encounters:   08/06/20 87.2 kg (192 lb 3.2 oz)   07/29/20 89.2 kg (196 lb 11.2 oz)   07/15/20 79.4 kg (175 lb)   07/08/20 75.7 kg (166 lb 14.4 oz)   07/08/20 75.7 kg (166 lb 14.4 oz)   06/25/20 79 kg (174 lb 1.6 oz)   06/24/20 78.9 kg (174 lb)   06/24/20 79 kg (174 lb 1.6 oz)   06/17/20 81 kg (178 lb 9.6 oz)   06/10/20 79.7 kg (175 lb 12.8 oz)   General: The patient is a pleasant female in no acute distress.   Neuro: pt maintains good eye contact, A+O*3, speech intake.   Lungs: equal chest rise and fall without distress or use of accessory muscles  Extremities: moves extremities without difficulty bilat  Skin: peeling on bilateral hands otherwise intact without rashes or lesions on visualized skin  Psych: pleasant, appropriate affect and judgement     The rest of a comprehensive physical examination is deferred due to PHE (public health emergency) video restrictions      Laboratory Data:     !COMPREHENSIVE Latest Ref Rng & Units 8/6/2020   SODIUM 133 - 144 mmol/L 142   POTASSIUM 3.4 - 5.3 mmol/L 4.0   CHLORIDE 94 - 109 mmol/L 113 (H)   BUN 7 - 30 mg/dL 3 (L)   Creatinine 0.52 - 1.04 mg/dL    Creatinine 0.52 - 1.04 mg/dL 0.76   Glucose 70 - 99 mg/dL 93   ANION GAP 3 - 14 mmol/L 5   CALCIUM 8.5 - 10.1 mg/dL 7.7 (L)   ALBUMIN 3.4 - 5.0 g/dL 2.1 (L)   PROTEIN, TOTAL 6.8 - 8.8 g/dL 5.1 (L)   AST 0 - 45 U/L 44   ALT 0 - 50 U/L 23   ALKPHOS 40 - 150 U/L 164 (H)   BILIRUBIN TOTAL 0.2 - 1.3 mg/dL 0.4   !HEMATOLOGY Latest Ref Rng & Units 8/6/2020   WBC 4.0 - 11.0 10e9/L 9.2   RBC 3.8 - 5.2 10e12/L 3.31 (L)   HGB 11.7 - 15.7 g/dL 9.2 (L)   HCT 35.0 - 47.0 % 29.5 (L)   MCV 78 - 100 fl 89   MCH 26.5 - 33.0 pg 27.8   MCHC 31.5 - 36.5 g/dL 31.2 (L)   RDW 10.0 - 15.0 % 25.4 (H)    - 450 10e9/L 328   PLATELET ESTIMATE     % NEUTROPHILS % 63.7   % LYMPHOCYTES % 22.2   ABSOLUTE LYMPHOCYTES 0.8 - 5.3 10e9/L 2.0    ABSOLUTE MONOCYTES 0.0 - 1.3 10e9/L 0.9   % EOSINOPHILS % 2.0   ABSOLUTE EOSINOPHILS 0.0 - 0.7 10e9/L 0.2   ABSOULTE BASOPHILS 0.0 - 0.2 10e9/L 0.1   FERRITIN 8 - 252 ng/mL    FOLATE >5.4 ng/mL    VITAMIN B12 193 - 986 pg/mL    IRON 35 - 180 ug/dL    SED RATE 0 - 30 mm/h    ABSOLUTE NEUTROPHIL 1.6 - 8.3 10e9/L 5.9       Assessment and Plan:    1.) Gastric adenocarcinona, HER-2 negative.   - Started on neoadjuvant FLOT on 3/7/2020. She has required a dose reduction of her oxaliplatin by 30% due to cold sensitivity/neuropathy with cycle #5. 5FU was decreased to 85% with cycle #6 due to mucositis/diarrhea/taste changes.   - Pt cleared from cycle 8 FLOT next week   - Plan for 8 cycles with PET and surgery to follow.     2.) RUQ Pain  -resolving acute cholangitis  -encouraged her to reduce from 10 mg to 5 mg of oxycodone when she can for pain management with goal of no opioids after resolution of acute infection   -following with palliative     3.) Fluid retention   -Likely d/t inpatient fluids vs chronic   -Encouraged KAYLA stockings and elevation of BLE  -Ordered one time dose of 10 mg lasix PO for BLE edema  -Pt instructed to call if the lasix does not improve the edema    4.) Dermitis r/t 5FU  -encouraged Eucerin use BID with generous application overnight followed by application of gloves and socks    Melba Giordano CNP     The patient was seen in conjunction with Melba Giordano CNP who served as a scribe for today's visit. I have reviewed and edited the above note, and agree with the above findings and plan.      Roxaan Sweet PA-C  [facility]  [address] [id number] [phone]

## 2020-08-07 NOTE — PROGRESS NOTES
"Nathalie Bashir is a 54 year old female who is being evaluated via a billable video visit.      The patient has been notified of following:     \"This video visit will be conducted via a call between you and your physician/provider. We have found that certain health care needs can be provided without the need for an in-person physical exam.  This service lets us provide the care you need with a video conversation.  If a prescription is necessary we can send it directly to your pharmacy.  If lab work is needed we can place an order for that and you can then stop by our lab to have the test done at a later time.    Video visits are billed at different rates depending on your insurance coverage.  Please reach out to your insurance provider with any questions.    If during the course of the call the physician/provider feels a video visit is not appropriate, you will not be charged for this service.\"    Patient has given verbal consent for Video visit? Yes    How would you like to obtain your AVS? MyChart     If you are dropped from the video visit, the video invite should be resent to: Text to cell phone: [phone]     Will anyone else be joining your video visit? No    Vitals - Patient Reported  Weight (Patient Reported): 87.1 kg (192 lb)  Height (Patient Reported): 172.7 cm (5' 7.99\")  BMI (Based on Pt Reported Ht/Wt): 29.2  Pain Score: Extreme Pain (8)  Pain Loc: Other - see comment(under right rib, liver)    Concerned about excess fluid on legs, ankles, and feet.    Pain 8/10 - under right rib, liver    Christian Harper LPN    Video-Visit Details    Type of service:  Video Visit    Video Start Time: 1247  Video End Time: 1316    Originating Location (pt. Location): Home    Distant Location (provider location):  [facility]     Platform used for Video Visit: Miguel A Giordano CNP      Oncology/Hematology Visit Note  Aug 7, 2020    Reason for Visit: Follow up of gastric " adenocarcinoma.    History of Present Illness:   Ms. Bashir is a 54 year old female with a history of gastric adenocarcinoma.  She was being followed for a pancreatic duct dilatation and pancreatic cyst which was noted in November 2018.  Since May 2019 she started noticing some nausea and vomiting and occasional abdominal discomfort.  She had an ultrasound in August 2019 which was essentially unremarkable.  She had a repeat endoscopic ultrasound on 10/29/2019 which showed increase in size of the cyst as well as dilation of the main pancreatic duct.  FNA and fluid analysis showed mucinous epithelium.  She was referred to Dr. 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  Surprisingly there was no pancreatic ductal neoplasm seen but on the resected specimen stomach cancer was noted.  It was poorly differentiated adenocarcinoma with poorly cohesive/signet ring cell type with proximal gastric mucosal and serosal margins being positive.  There was lymphovascular and perineural invasion seen.  22 lymph nodes were sampled and all were benign.  It was a pT4aN0 lesion.  HER-2/christine FISH was not amplified.         EUS on 3/3/2020 without clear evidence of neoplasm endoscopically. Left gastric lymph node fine-needle aspiration was negative for carcinoma. Biopsy from the gastric jejunal anastomosis as well as lesser curvature of the stomach also did not show any malignancy.     She had a PET/CT on 3/13/2020 which showed soft tissue streaky density with increased FDG uptake adjacent to the pancreaticojejunostomy which could be neoplastic in origin.  There is mild increased FDG uptake in the gastric remnant.  Focal area of soft tissue thickening with fatty streakiness along medial laparotomy with increased FDG uptake which likely is inflammatory. Increased FDG uptake in thoracic esophagus which could be esophagitis.      She started on chemotherapy with 5FU, oxaliplatin, and Taxotere on 3/27/20.  "She was seen on 4/7/20 for evaluation of fevers. She was treated for possible pneumonia with Levaquin.      She was seen in clinic 4/13/20 for RUQ pain,CT and labs reassuring. She received cycles 2 and 3 of chemotherapy on 4/20/20 and 5/4/20. She was admitted from 5/21-5/23/20 with neutropenic aspiration pneumonia. She received cycle 4 on 5/27/20 with Neulasta support. PET/CT on 6/3/20 showed mild residual uptake at the site of gastrojejunal anastomosis which likely is physiologic.  There is almost resolution of soft tissue nodule next to the pancreaticoduodenal anastomosis without FDG uptake.  There is focal increased uptake in the left posterior acetabulum which could be artifact.     Decreased dose of oxaliplatin due to neuropathy/cold sensitivity on 6/11. Decreased 5FU to 85% with cycle #6 due to mucositis/diarrhea/taste changes. Treatment was held 7/9/20 due to diarrhea, dehydration, tachycardia, rash, and weight loss and she got cycle 7 on 7/15. On 7/20 she presented to the ED with RUQ abdominal pain, and was admitted 7/20-7/29 for acute cholangitis     She is here for hospital follow up and consideration of delayed cycle 8 of FLOT.    Interval History:  -Pt reports she is doing okay after hospitalization. Finished oral abx. Reports she feels \"Im still full of fluid though\". Reports she has had BLE since she received IV fluids inpatient. Reports she has tried compression stockings and elevation. States she was 192 lbs yesterday at lab and is usually around 177 lbs. Reports she is diuretic naive.     -Pain is her main compliant. Reports pain is in RUQ, describes pain as \"tight\" and \"sore\". Reports pain has improved since her inpatient stay. She is currently taking Oxycodone 5 mg tablets, 2 tablets, every 6 hours. Is waking up overnight for pain medications. Reports supine position makes pain better, meals do not make the pain worse.     -Reports ongoing neuropathy. Feels this is worst in her feet but stable. " Describes as numbness, denies pain. Finger neuropathy is improving.     -Other sx related to treatment are: cold sensitivity on lips 1-2 days after treatment, nausea improved by Zofran, and peeling of her hands and feet.     -Pt expresses she feels ready for her next treatment and excited it is her last!    Denies mucositis, fever, chills, cough, dyspnea, vision changes, chest pain, diarrhea, constipation, vomiting, bowel or bladder concerns, fatigue, sleep problems, mood changes, lumps/bumps, bleeding, and anorexia     Review of Systems:    -Constitutinal: denies fevers, chills, fatigue, and weight changes   -Skin: reports dry/peeling of skin on bilateral hands and feet. Denies rashes, petechiae, bruising, open wounds  -HEENT:  Denies mucositis and dry mouth  -Resp: Denies cough and dyspnea  -Cardiac: reports BLE. Denies chest pain and palpitation  -GI: reports nausea relieved by zofran. Denies constipation, diarrhea, and vomiting  -: denies hematuria and dysuria  -Neuro: denies headaches, vision changes. Reports baseline neuropathy  -Psych: reports she is doing well and her 5 grandchildren are keeping her busy      Current Outpatient Medications   Medication Sig Dispense Refill     amitriptyline (ELAVIL) 25 MG tablet Take 3 tablets by mouth At Bedtime       citalopram (CELEXA) 20 MG tablet Take 20 mg by mouth every morning        dexamethasone (DECADRON) 4 MG tablet Take 1 tablet (4mg) the day before, day of, and 2 days after chemotherapy 12 tablet 3     diclofenac (FLECTOR) 1.3 % patch Place 1 patch onto the skin daily as needed        famotidine (PEPCID) 20 MG tablet Take 1 tablet (20 mg) by mouth 2 times daily 60 tablet 3     loperamide (IMODIUM) 2 MG capsule Take 4 mg by mouth 4 times daily as needed for diarrhea        LORazepam (ATIVAN) 0.5 MG tablet Take 1 tablet (0.5 mg) by mouth every 4 hours as needed (Anxiety, Nausea/Vomiting or Sleep) 30 tablet 5     meclizine (ANTIVERT) 25 MG tablet Take 1 tablet  (25 mg) by mouth 3 times daily as needed for dizziness 60 tablet 0     ondansetron (ZOFRAN) 8 MG tablet Take 1 tablet (8 mg) by mouth every 8 hours as needed (Nausea/Vomiting) 90 tablet 3     oxyCODONE (ROXICODONE) 5 MG tablet Take 1-2 tablets (5-10 mg) by mouth every 6 hours as needed for pain (. Stop tramadol. Cannot take with tramadol.) 15 tablet 0     pantoprazole (PROTONIX) 40 MG EC tablet TAKE ONE TABLET BY MOUTH TWICE A DAY 60 tablet 1     potassium chloride ER (KLOR-CON M) 20 MEQ CR tablet Take 1 tablet (20 mEq) by mouth daily 30 tablet 1     simethicone 80 MG TABS Take 1 tablet by mouth every 6 hours as needed (bloating, gas, belching) 90 tablet 3     traMADol (ULTRAM) 50 MG tablet Take 50 mg by mouth every 6 hours as needed for severe pain       naloxone (NARCAN) 4 MG/0.1ML nasal spray Spray 1 spray (4 mg) into one nostril alternating nostrils once as needed for opioid reversal every 2-3 minutes until assistance arrives (Patient not taking: Reported on 8/7/2020) 0.2 mL 0       Physical Examination:    LMP 09/23/2014   Wt Readings from Last 10 Encounters:   08/06/20 87.2 kg (192 lb 3.2 oz)   07/29/20 89.2 kg (196 lb 11.2 oz)   07/15/20 79.4 kg (175 lb)   07/08/20 75.7 kg (166 lb 14.4 oz)   07/08/20 75.7 kg (166 lb 14.4 oz)   06/25/20 79 kg (174 lb 1.6 oz)   06/24/20 78.9 kg (174 lb)   06/24/20 79 kg (174 lb 1.6 oz)   06/17/20 81 kg (178 lb 9.6 oz)   06/10/20 79.7 kg (175 lb 12.8 oz)   General: The patient is a pleasant female in no acute distress.   Neuro: pt maintains good eye contact, A+O*3, speech intake.   Lungs: equal chest rise and fall without distress or use of accessory muscles  Extremities: moves extremities without difficulty bilat  Skin: peeling on bilateral hands otherwise intact without rashes or lesions on visualized skin  Psych: pleasant, appropriate affect and judgement     The rest of a comprehensive physical examination is deferred due to PHE (public health emergency) video  restrictions      Laboratory Data:     !COMPREHENSIVE Latest Ref Rng & Units 8/6/2020   SODIUM 133 - 144 mmol/L 142   POTASSIUM 3.4 - 5.3 mmol/L 4.0   CHLORIDE 94 - 109 mmol/L 113 (H)   BUN 7 - 30 mg/dL 3 (L)   Creatinine 0.52 - 1.04 mg/dL    Creatinine 0.52 - 1.04 mg/dL 0.76   Glucose 70 - 99 mg/dL 93   ANION GAP 3 - 14 mmol/L 5   CALCIUM 8.5 - 10.1 mg/dL 7.7 (L)   ALBUMIN 3.4 - 5.0 g/dL 2.1 (L)   PROTEIN, TOTAL 6.8 - 8.8 g/dL 5.1 (L)   AST 0 - 45 U/L 44   ALT 0 - 50 U/L 23   ALKPHOS 40 - 150 U/L 164 (H)   BILIRUBIN TOTAL 0.2 - 1.3 mg/dL 0.4   !HEMATOLOGY Latest Ref Rng & Units 8/6/2020   WBC 4.0 - 11.0 10e9/L 9.2   RBC 3.8 - 5.2 10e12/L 3.31 (L)   HGB 11.7 - 15.7 g/dL 9.2 (L)   HCT 35.0 - 47.0 % 29.5 (L)   MCV 78 - 100 fl 89   MCH 26.5 - 33.0 pg 27.8   MCHC 31.5 - 36.5 g/dL 31.2 (L)   RDW 10.0 - 15.0 % 25.4 (H)    - 450 10e9/L 328   PLATELET ESTIMATE     % NEUTROPHILS % 63.7   % LYMPHOCYTES % 22.2   ABSOLUTE LYMPHOCYTES 0.8 - 5.3 10e9/L 2.0   ABSOLUTE MONOCYTES 0.0 - 1.3 10e9/L 0.9   % EOSINOPHILS % 2.0   ABSOLUTE EOSINOPHILS 0.0 - 0.7 10e9/L 0.2   ABSOULTE BASOPHILS 0.0 - 0.2 10e9/L 0.1   FERRITIN 8 - 252 ng/mL    FOLATE >5.4 ng/mL    VITAMIN B12 193 - 986 pg/mL    IRON 35 - 180 ug/dL    SED RATE 0 - 30 mm/h    ABSOLUTE NEUTROPHIL 1.6 - 8.3 10e9/L 5.9       Assessment and Plan:    1.) Gastric adenocarcinona, HER-2 negative.   - Started on neoadjuvant FLOT on 3/7/2020. She has required a dose reduction of her oxaliplatin by 30% due to cold sensitivity/neuropathy with cycle #5. 5FU was decreased to 85% with cycle #6 due to mucositis/diarrhea/taste changes.   - Pt cleared from cycle 8 FLOT next week   - Plan for 8 cycles with PET and surgery to follow.     2.) RUQ Pain  -resolving acute cholangitis  -encouraged her to reduce from 10 mg to 5 mg of oxycodone when she can for pain management with goal of no opioids after resolution of acute infection   -following with palliative     3.) Fluid retention   -Likely  d/t inpatient fluids vs chronic   -Encouraged KAYLA stockings and elevation of BLE  -Ordered one time dose of 10 mg lasix PO for BLE edema  -Pt instructed to call if the lasix does not improve the edema    4.) Dermitis r/t 5FU  -encouraged Eucerin use BID with generous application overnight followed by application of gloves and socks    Melba Giordano CNP     The patient was seen in conjunction with Melba Giordano CNP who served as a scribe for today's visit. I have reviewed and edited the above note, and agree with the above findings and plan.      Roxana Sweet PA-C  [facility]  [address], [place] [id number] [phone]

## 2020-08-10 ENCOUNTER — PATIENT OUTREACH (OUTPATIENT)
Dept: ONCOLOGY | Facility: CLINIC | Age: 55
End: 2020-08-10

## 2020-08-10 DIAGNOSIS — R60.0 LOCALIZED EDEMA: Primary | ICD-10-CM

## 2020-08-10 DIAGNOSIS — J81.0 ACUTE EDEMA OF LUNG (H): ICD-10-CM

## 2020-08-10 RX ORDER — MEPERIDINE HYDROCHLORIDE 25 MG/ML
25 INJECTION INTRAMUSCULAR; INTRAVENOUS; SUBCUTANEOUS EVERY 30 MIN PRN
Status: CANCELLED | OUTPATIENT
Start: 2020-08-12

## 2020-08-10 RX ORDER — DIPHENHYDRAMINE HYDROCHLORIDE 50 MG/ML
50 INJECTION INTRAMUSCULAR; INTRAVENOUS
Status: CANCELLED
Start: 2020-08-12

## 2020-08-10 RX ORDER — EPINEPHRINE 1 MG/ML
0.3 INJECTION, SOLUTION INTRAMUSCULAR; SUBCUTANEOUS EVERY 5 MIN PRN
Status: CANCELLED | OUTPATIENT
Start: 2020-08-12

## 2020-08-10 RX ORDER — EPINEPHRINE 0.3 MG/.3ML
0.3 INJECTION SUBCUTANEOUS EVERY 5 MIN PRN
Status: CANCELLED | OUTPATIENT
Start: 2020-08-12

## 2020-08-10 RX ORDER — ONDANSETRON 2 MG/ML
8 INJECTION INTRAMUSCULAR; INTRAVENOUS ONCE
Status: CANCELLED
Start: 2020-08-12

## 2020-08-10 RX ORDER — LORAZEPAM 2 MG/ML
0.5 INJECTION INTRAMUSCULAR EVERY 4 HOURS PRN
Status: CANCELLED
Start: 2020-08-12

## 2020-08-10 RX ORDER — FUROSEMIDE 20 MG
10 TABLET ORAL DAILY
Qty: 3 TABLET | Refills: 0 | Status: ON HOLD | OUTPATIENT
Start: 2020-08-10 | End: 2020-08-23

## 2020-08-10 RX ORDER — ALBUTEROL SULFATE 0.83 MG/ML
2.5 SOLUTION RESPIRATORY (INHALATION)
Status: CANCELLED | OUTPATIENT
Start: 2020-08-12

## 2020-08-10 RX ORDER — NALOXONE HYDROCHLORIDE 0.4 MG/ML
.1-.4 INJECTION, SOLUTION INTRAMUSCULAR; INTRAVENOUS; SUBCUTANEOUS
Status: CANCELLED | OUTPATIENT
Start: 2020-08-12

## 2020-08-10 RX ORDER — METHYLPREDNISOLONE SODIUM SUCCINATE 125 MG/2ML
125 INJECTION, POWDER, LYOPHILIZED, FOR SOLUTION INTRAMUSCULAR; INTRAVENOUS
Status: CANCELLED
Start: 2020-08-12

## 2020-08-10 RX ORDER — ALBUTEROL SULFATE 90 UG/1
1-2 AEROSOL, METERED RESPIRATORY (INHALATION)
Status: CANCELLED
Start: 2020-08-12

## 2020-08-10 RX ORDER — SODIUM CHLORIDE 9 MG/ML
1000 INJECTION, SOLUTION INTRAVENOUS CONTINUOUS PRN
Status: CANCELLED
Start: 2020-08-12

## 2020-08-10 NOTE — PROGRESS NOTES
Patient calls to state that she is still retaining water, and she is still very swollen from the waist down. She states that she took the lasix that was prescribed but feels and sees no change. Besides not moving around as well due to swelling she feels good, is eating and drinking, feels no shortness of breath. She plans to come to the clinic today to  a prescription and wonders if additional diuretics should be sent. I let her know we would discuss with Roxana Sweet and get back to her.    Migdalia Kelley RN

## 2020-08-10 NOTE — PROGRESS NOTES
"Per Rxoana Sweet: \"Let's send 5 days of 10 mg Lasix daily. Thanks. Roxana\" I called Nathalie to let her know I we had sent a script to the clinic pharmacy. She was agreeable to the plan.    Migdalia Kelley RN      "

## 2020-08-12 ENCOUNTER — PATIENT OUTREACH (OUTPATIENT)
Dept: ONCOLOGY | Facility: CLINIC | Age: 55
End: 2020-08-12

## 2020-08-12 NOTE — PROGRESS NOTES
Called Nathalie to offer infusion appointment for today. She is unable able to come to clinic, she is watching her grandchildren today.   Updated infusion and scheduling, they will call her with a new date and time for infusion.

## 2020-08-13 ENCOUNTER — HOSPITAL ENCOUNTER (INPATIENT)
Facility: CLINIC | Age: 55
Setting detail: SURGERY ADMIT
End: 2020-08-13
Attending: SURGERY | Admitting: SURGERY
Payer: MEDICARE

## 2020-08-13 DIAGNOSIS — Z11.59 ENCOUNTER FOR SCREENING FOR OTHER VIRAL DISEASES: Primary | ICD-10-CM

## 2020-08-13 DIAGNOSIS — K52.9 JEJUNITIS: ICD-10-CM

## 2020-08-14 ENCOUNTER — INFUSION THERAPY VISIT (OUTPATIENT)
Dept: ONCOLOGY | Facility: CLINIC | Age: 55
End: 2020-08-14
Attending: PHYSICIAN ASSISTANT
Payer: MEDICARE

## 2020-08-14 ENCOUNTER — HOME INFUSION (PRE-WILLOW HOME INFUSION) (OUTPATIENT)
Dept: PHARMACY | Facility: CLINIC | Age: 55
End: 2020-08-14

## 2020-08-14 VITALS
BODY MASS INDEX: 27.67 KG/M2 | OXYGEN SATURATION: 93 % | RESPIRATION RATE: 16 BRPM | DIASTOLIC BLOOD PRESSURE: 68 MMHG | TEMPERATURE: 98.7 F | WEIGHT: 182 LBS | HEART RATE: 119 BPM | SYSTOLIC BLOOD PRESSURE: 111 MMHG

## 2020-08-14 DIAGNOSIS — T45.1X5A CHEMOTHERAPY-INDUCED NEUTROPENIA (H): ICD-10-CM

## 2020-08-14 DIAGNOSIS — D70.1 CHEMOTHERAPY-INDUCED NEUTROPENIA (H): ICD-10-CM

## 2020-08-14 DIAGNOSIS — C16.9 GASTRIC ADENOCARCINOMA (H): Primary | ICD-10-CM

## 2020-08-14 LAB
ALBUMIN SERPL-MCNC: 2.1 G/DL (ref 3.4–5)
ALP SERPL-CCNC: 202 U/L (ref 40–150)
ALT SERPL W P-5'-P-CCNC: 27 U/L (ref 0–50)
ANION GAP SERPL CALCULATED.3IONS-SCNC: 4 MMOL/L (ref 3–14)
AST SERPL W P-5'-P-CCNC: 66 U/L (ref 0–45)
BASOPHILS # BLD AUTO: 0 10E9/L (ref 0–0.2)
BASOPHILS NFR BLD AUTO: 0.6 %
BILIRUB SERPL-MCNC: 0.3 MG/DL (ref 0.2–1.3)
BUN SERPL-MCNC: 7 MG/DL (ref 7–30)
CALCIUM SERPL-MCNC: 8 MG/DL (ref 8.5–10.1)
CHLORIDE SERPL-SCNC: 107 MMOL/L (ref 94–109)
CO2 SERPL-SCNC: 30 MMOL/L (ref 20–32)
CREAT SERPL-MCNC: 0.82 MG/DL (ref 0.52–1.04)
DIFFERENTIAL METHOD BLD: ABNORMAL
EOSINOPHIL # BLD AUTO: 0.5 10E9/L (ref 0–0.7)
EOSINOPHIL NFR BLD AUTO: 7.2 %
ERYTHROCYTE [DISTWIDTH] IN BLOOD BY AUTOMATED COUNT: 23.8 % (ref 10–15)
GFR SERPL CREATININE-BSD FRML MDRD: 82 ML/MIN/{1.73_M2}
GLUCOSE SERPL-MCNC: 114 MG/DL (ref 70–99)
HCT VFR BLD AUTO: 27.4 % (ref 35–47)
HGB BLD-MCNC: 8.5 G/DL (ref 11.7–15.7)
IMM GRANULOCYTES # BLD: 0 10E9/L (ref 0–0.4)
IMM GRANULOCYTES NFR BLD: 0.2 %
LYMPHOCYTES # BLD AUTO: 2 10E9/L (ref 0.8–5.3)
LYMPHOCYTES NFR BLD AUTO: 30.7 %
MCH RBC QN AUTO: 28 PG (ref 26.5–33)
MCHC RBC AUTO-ENTMCNC: 31 G/DL (ref 31.5–36.5)
MCV RBC AUTO: 90 FL (ref 78–100)
MONOCYTES # BLD AUTO: 0.6 10E9/L (ref 0–1.3)
MONOCYTES NFR BLD AUTO: 8.4 %
NEUTROPHILS # BLD AUTO: 3.5 10E9/L (ref 1.6–8.3)
NEUTROPHILS NFR BLD AUTO: 52.9 %
NRBC # BLD AUTO: 0 10*3/UL
NRBC BLD AUTO-RTO: 0 /100
PLATELET # BLD AUTO: 222 10E9/L (ref 150–450)
POTASSIUM SERPL-SCNC: 4 MMOL/L (ref 3.4–5.3)
PROT SERPL-MCNC: 5.4 G/DL (ref 6.8–8.8)
RBC # BLD AUTO: 3.04 10E12/L (ref 3.8–5.2)
SODIUM SERPL-SCNC: 140 MMOL/L (ref 133–144)
WBC # BLD AUTO: 6.5 10E9/L (ref 4–11)

## 2020-08-14 PROCEDURE — 96367 TX/PROPH/DG ADDL SEQ IV INF: CPT

## 2020-08-14 PROCEDURE — 96417 CHEMO IV INFUS EACH ADDL SEQ: CPT

## 2020-08-14 PROCEDURE — 25000125 ZZHC RX 250: Mod: ZF | Performed by: PHYSICIAN ASSISTANT

## 2020-08-14 PROCEDURE — 96415 CHEMO IV INFUSION ADDL HR: CPT

## 2020-08-14 PROCEDURE — 96368 THER/DIAG CONCURRENT INF: CPT

## 2020-08-14 PROCEDURE — 80053 COMPREHEN METABOLIC PANEL: CPT | Performed by: PHYSICIAN ASSISTANT

## 2020-08-14 PROCEDURE — 96375 TX/PRO/DX INJ NEW DRUG ADDON: CPT

## 2020-08-14 PROCEDURE — 85025 COMPLETE CBC W/AUTO DIFF WBC: CPT | Performed by: PHYSICIAN ASSISTANT

## 2020-08-14 PROCEDURE — G0498 CHEMO EXTEND IV INFUS W/PUMP: HCPCS

## 2020-08-14 PROCEDURE — 25800030 ZZH RX IP 258 OP 636: Mod: ZF | Performed by: PHYSICIAN ASSISTANT

## 2020-08-14 PROCEDURE — 25000128 H RX IP 250 OP 636: Mod: ZF | Performed by: PHYSICIAN ASSISTANT

## 2020-08-14 PROCEDURE — 96413 CHEMO IV INFUSION 1 HR: CPT

## 2020-08-14 RX ORDER — ONDANSETRON 2 MG/ML
8 INJECTION INTRAMUSCULAR; INTRAVENOUS ONCE
Status: COMPLETED | OUTPATIENT
Start: 2020-08-14 | End: 2020-08-14

## 2020-08-14 RX ORDER — HEPARIN SODIUM (PORCINE) LOCK FLUSH IV SOLN 100 UNIT/ML 100 UNIT/ML
5 SOLUTION INTRAVENOUS EVERY 8 HOURS PRN
Status: DISCONTINUED | OUTPATIENT
Start: 2020-08-14 | End: 2020-08-14 | Stop reason: HOSPADM

## 2020-08-14 RX ADMIN — DEXAMETHASONE SODIUM PHOSPHATE: 10 INJECTION, SOLUTION INTRAMUSCULAR; INTRAVENOUS at 07:52

## 2020-08-14 RX ADMIN — DEXTROSE MONOHYDRATE 250 ML: 50 INJECTION, SOLUTION INTRAVENOUS at 09:31

## 2020-08-14 RX ADMIN — Medication 5 ML: at 06:35

## 2020-08-14 RX ADMIN — SODIUM CHLORIDE 1000 ML: 9 INJECTION, SOLUTION INTRAVENOUS at 07:31

## 2020-08-14 RX ADMIN — FAMOTIDINE 20 MG: 10 INJECTION INTRAVENOUS at 07:50

## 2020-08-14 RX ADMIN — OXALIPLATIN 118 MG: 5 INJECTION, SOLUTION INTRAVENOUS at 09:32

## 2020-08-14 RX ADMIN — ONDANSETRON 8 MG: 2 INJECTION INTRAMUSCULAR; INTRAVENOUS at 07:47

## 2020-08-14 RX ADMIN — DOCETAXEL 100 MG: 20 INJECTION, SOLUTION, CONCENTRATE INTRAVENOUS at 08:24

## 2020-08-14 RX ADMIN — LEUCOVORIN CALCIUM 400 MG: 200 INJECTION, POWDER, LYOPHILIZED, FOR SOLUTION INTRAMUSCULAR; INTRAVENOUS at 09:31

## 2020-08-14 ASSESSMENT — PAIN SCALES - GENERAL: PAINLEVEL: NO PAIN (0)

## 2020-08-14 NOTE — PROGRESS NOTES
Infusion Nursing Note:  Nathalie Bashir presents today for Cycle 8 Day 1 Taxotere, Oxaliplatin, Leucovorin, Fluorouracil pump.    Patient seen by provider today: No   present during visit today: Not Applicable.    Note: Offers no new complaints.  Still has her RUQ pain, but this is improving some.    Continues to have lower extremity edema, but this is also improving.  She has 1 day left of her 5 day course of Lasix and she would like a refill as her weight is still up about 7-8 pounds and she can feel it in her legs.  She has 1 liter of IV NS ordered on her treatment plan today.  Will discuss this with Roxana PICKERING as her chemo w/premeds will be about 1 liter.    JOSE A Whitehead RN / Roxana PICKERING @ [phone]  1.  Complete Lasix as ordered.  Roxana does not want to refill as she is concerned about causing kidney issues.  Patient to call w/increaed weight or leg tightness/swelling.  It's OK if patient wants IVF tomorrow when she comes in for pump disconnect.  Orders are in therapy plan.  2.  Give 500 ml NS today with chemo (rather than the 1 liter)    Intravenous Access:  Implanted Port.    Treatment Conditions:  Lab Results   Component Value Date    HGB 8.5 08/14/2020     Lab Results   Component Value Date    WBC 6.5 08/14/2020      Lab Results   Component Value Date    ANEU 3.5 08/14/2020     Lab Results   Component Value Date     08/14/2020      Lab Results   Component Value Date     08/14/2020                   Lab Results   Component Value Date    POTASSIUM 4.0 08/14/2020           Lab Results   Component Value Date    MAG 2.6 07/29/2020            Lab Results   Component Value Date    CR 0.82 08/14/2020                   Lab Results   Component Value Date    PETRA 8.0 08/14/2020                Lab Results   Component Value Date    BILITOTAL 0.3 08/14/2020           Lab Results   Component Value Date    ALBUMIN 2.1 08/14/2020                    Lab Results   Component Value Date    ALT 27  08/14/2020           Lab Results   Component Value Date    AST 66 08/14/2020       Results reviewed, labs MET treatment parameters, ok to proceed with treatment.      Post Infusion Assessment:  Patient tolerated infusion without incident.  Blood return noted pre and post infusion.  Site patent and intact, free from redness, edema or discomfort.  No evidence of extravasations.  Fluorouracil CADD pump infusing at 10 ml/hr x 24 hours.  Pump connections taped, clamps taped open.  Pump connections double checked by Astrid Ospina RN.  Nathalie to return here tomorrow for pump disconnect.       Discharge Plan:   Patient declined prescription refills.  Copy of AVS reviewed with patient and/or family.  Patient will return tomorrow for possible IVF and pump disconnect.   (I/B to Roxana PICKERING and Hamida Hernandez RNCC as no follow up is currently scheduled) for next appointment.  Patient discharged in stable condition accompanied by: self.  Departure Mode: Ambulatory.  Face to Face time: 0.    Ambika Whitehead RN

## 2020-08-14 NOTE — PATIENT INSTRUCTIONS
[facility] Main Line: [phone]    Call triage nurse with chills and/or temperature greater than or equal to 100.4, uncontrolled nausea/vomiting, diarrhea, constipation, dizziness, shortness of breath, chest pain, bleeding, unexplained bruising, or any new/concerning symptoms, questions/concerns.   If you are having any concerning symptoms or wish to speak to a provider before your next infusion visit, please call your care coordinator or triage to notify them so we can adequately serve you.   Triage Nurse Line: [phone]    If after hours, weekends, or holidays [phone]

## 2020-08-15 ENCOUNTER — INFUSION THERAPY VISIT (OUTPATIENT)
Dept: ONCOLOGY | Facility: CLINIC | Age: 55
End: 2020-08-15
Attending: PHYSICIAN ASSISTANT
Payer: MEDICARE

## 2020-08-15 VITALS
RESPIRATION RATE: 16 BRPM | DIASTOLIC BLOOD PRESSURE: 64 MMHG | OXYGEN SATURATION: 97 % | TEMPERATURE: 98.7 F | HEART RATE: 105 BPM | SYSTOLIC BLOOD PRESSURE: 99 MMHG

## 2020-08-15 DIAGNOSIS — D70.1 CHEMOTHERAPY-INDUCED NEUTROPENIA (H): Primary | ICD-10-CM

## 2020-08-15 DIAGNOSIS — T45.1X5A CHEMOTHERAPY-INDUCED NEUTROPENIA (H): Primary | ICD-10-CM

## 2020-08-15 DIAGNOSIS — C16.9 GASTRIC ADENOCARCINOMA (H): ICD-10-CM

## 2020-08-15 PROCEDURE — 25800030 ZZH RX IP 258 OP 636: Mod: ZF | Performed by: PHYSICIAN ASSISTANT

## 2020-08-15 PROCEDURE — 96377 APPLICATON ON-BODY INJECTOR: CPT | Mod: 59

## 2020-08-15 PROCEDURE — 96361 HYDRATE IV INFUSION ADD-ON: CPT

## 2020-08-15 PROCEDURE — 96374 THER/PROPH/DIAG INJ IV PUSH: CPT

## 2020-08-15 PROCEDURE — 25000128 H RX IP 250 OP 636: Mod: ZF | Performed by: PHYSICIAN ASSISTANT

## 2020-08-15 RX ORDER — HEPARIN SODIUM (PORCINE) LOCK FLUSH IV SOLN 100 UNIT/ML 100 UNIT/ML
5 SOLUTION INTRAVENOUS
Status: DISCONTINUED | OUTPATIENT
Start: 2020-08-15 | End: 2020-08-15 | Stop reason: HOSPADM

## 2020-08-15 RX ORDER — ONDANSETRON 2 MG/ML
8 INJECTION INTRAMUSCULAR; INTRAVENOUS EVERY 8 HOURS PRN
Status: CANCELLED
Start: 2020-08-15

## 2020-08-15 RX ORDER — ONDANSETRON 2 MG/ML
8 INJECTION INTRAMUSCULAR; INTRAVENOUS EVERY 8 HOURS PRN
Status: DISCONTINUED | OUTPATIENT
Start: 2020-08-15 | End: 2020-08-15 | Stop reason: HOSPADM

## 2020-08-15 RX ADMIN — Medication 5 ML: at 12:02

## 2020-08-15 RX ADMIN — SODIUM CHLORIDE 1000 ML: 9 INJECTION, SOLUTION INTRAVENOUS at 10:53

## 2020-08-15 RX ADMIN — ONDANSETRON 8 MG: 2 INJECTION INTRAMUSCULAR; INTRAVENOUS at 11:14

## 2020-08-15 RX ADMIN — PEGFILGRASTIM 6 MG: KIT SUBCUTANEOUS at 12:00

## 2020-08-15 ASSESSMENT — PAIN SCALES - GENERAL: PAINLEVEL: MILD PAIN (3)

## 2020-08-15 NOTE — PROGRESS NOTES
Infusion Nursing Note:  Nathalie Bashir presents today for Cycle 8 Fluorouracil Pump Disconnect, On body Neulasta, and IV fluids.      Note: Nathalie came in today saying she felt well.  Denies fevers.  She is eating and drinking alright.  Antiemetics relieving nausea.  Expects some taste changes in the next few days along with a decrease in appetite so prn IV fluids were infused today.      About twenty minutes into IV fluids Nathalie put the call light on saying she had nausea and asked for an antiemetic.  Zofran given with relief.      Neulasta Onpro On-Body injector applied to LLQ abdomen  at 1200 with light facing up.  Writer discussed Neulasta injection would start tomorrow 8/16/20 at 1500, approximately 27 hours after application applied today.  Written and Verbal instruction reviewed with patient.  Pt instructed when the dose delivery starts, it will take about 45 minutes to complete.  Pt aware Neulasta Onpro On-Body should have green flashing light and to call triage or on-call MD if injector flashes red or appears to be leaking. Pt aware to keep Onpro On-Body Neulasta 4 inches away from electrical equipment and to avoid showering 4 hours prior to injection.       Intravenous Access:  Implanted Port.        Post Infusion Assessment:  Patient tolerated infusion without incident.       Discharge Plan:   [facility]  Patient will return 8/1820 with palliative care  Face to Face time: 0.    Jamila Rodriguez RN

## 2020-08-17 ENCOUNTER — HOME INFUSION (PRE-WILLOW HOME INFUSION) (OUTPATIENT)
Dept: PHARMACY | Facility: CLINIC | Age: 55
End: 2020-08-17

## 2020-08-17 NOTE — PROGRESS NOTES
This is a recent snapshot of the patient's [facility] medical record.  For current drug dose and complete information and questions, call [phone] or In Basket pool, [facility] home infusion ([id number])  CSN Number:  [id number]

## 2020-08-18 ENCOUNTER — VIRTUAL VISIT (OUTPATIENT)
Dept: PALLIATIVE CARE | Facility: CLINIC | Age: 55
End: 2020-08-18
Attending: INTERNAL MEDICINE
Payer: MEDICARE

## 2020-08-18 ENCOUNTER — TELEPHONE (OUTPATIENT)
Dept: SURGERY | Facility: CLINIC | Age: 55
End: 2020-08-18

## 2020-08-18 DIAGNOSIS — R11.0 NAUSEA: ICD-10-CM

## 2020-08-18 DIAGNOSIS — C16.9 GASTRIC ADENOCARCINOMA (H): Primary | ICD-10-CM

## 2020-08-18 DIAGNOSIS — Z11.59 ENCOUNTER FOR SCREENING FOR OTHER VIRAL DISEASES: Primary | ICD-10-CM

## 2020-08-18 DIAGNOSIS — R10.11 RUQ ABDOMINAL PAIN: ICD-10-CM

## 2020-08-18 DIAGNOSIS — F43.23 ADJUSTMENT DISORDER WITH MIXED ANXIETY AND DEPRESSED MOOD: ICD-10-CM

## 2020-08-18 PROCEDURE — 99214 OFFICE O/P EST MOD 30 MIN: CPT | Mod: 95 | Performed by: INTERNAL MEDICINE

## 2020-08-18 PROCEDURE — 40001009 ZZH VIDEO/TELEPHONE VISIT; NO CHARGE

## 2020-08-18 NOTE — LETTER
"8/18/2020       RE: Nathalie Bashir  [address] [address]     Dear Colleague,    Thank you for referring your patient, Nathalie Bashir, to the [facility] at [facility]. Please see a copy of my visit note below.    Concerns: No new concerns.   Refills: Lorazepam.     Vitals - Patient Reported  Weight (Patient Reported): 82.6 kg (182 lb 1.6 oz)  Height (Patient Reported): 172.7 cm (5' 7.99\")  BMI (Based on Pt Reported Ht/Wt): 27.69  Pain Score: Moderate Pain (5)  Pain Loc: Abdomen    Astrid Couch CMA      Palliative Care Outpatient Clinic Progress Note    Patient Name:  Nathalie Bashir  Primary Provider:  Marianne Gonzalez    Chief Complaint / Identifying information:   Nathalie Bashir is a 55yo F with gastric adenocarcinoma              - Found during Whipple 1/2020 for presumed IPMN - no pancreatic malignancy found, but had poorly differentiated adeno ca              - Receiving neoadjuvant chemo (5FU, leucovorin, oxaliplatin, docetaxel) prior to planned curative intent surgery with Dr. Mallory. She completed all 8 cycles and is planning for surgery in September.   Hx chronic neck/back pain previously on opioids managed by a local pain clinic (Apurva Benjamin NP in [place], prescribing oxycodone 10 mg #120/month dating back to at least 3/2019 and at some point was tried on buprenorphine). Now back on oxycodone prescribed by oncology and palliative care   Hospitalized in May and stated on olanzapine for nausea.     Last Palliative care appointment: 6/10 Dr Scott  Recommendations:   LUQ pain, GERD, nausea - discomfort in LUQ with feelings of reflux, and at times nausea.  Reflux seems most predominant.  Sucralfate did not help.   - Continue BID pepcid and protonix  - Can take Tums up to TID (says was told not to take by someone but I don't see this documented. Ca, kidney function OK)  - Continue olanzapine 5 mg QHS for nausea, with zofran PRN  - " Continue tramadol 50 mg q6h PRN, refilled today     Depressed mood - stable.  Continue citalopram.       Neuropathy - worsening after chemo in hands and cold sensitivity, will get better before next cycle.  Continue chronic elavil.       Gastric cancer, following with Dr. Eagle and Dr. Mallory.  Coping well.  Plan to continue chemo through July, followed by repeat surgery.       Reviewed: yes    08/10/2020 1  Lorazepam 0.5 Mg Tablet 30.00 5 Aa Ome [id number] Charles ([id number])   08/10/2020 1  Oxycodone Hcl 5 Mg Tablet 60.00 7 Sa Pov [id number] Cahrles ([id number]) 0/0  08/06/2020 1  Oxycodone Hcl 5 Mg Tablet 15.00 3 Sa Pov [id number] Charles ([id number]) 0/0  08/03/2020 3  Oxycodone Hcl 5 Mg Tablet 15.00 3 Sa Pov [id number] Gra ([id number]) 0/0  07/29/2020 1  Oxycodone Hcl 5 Mg Tablet 15.00 3 Hinds Tor [id number] Charles ([id number]) 0/0  07/20/2020 1  Lorazepam 0.5 Mg Tablet 30.00 5 Aa Ome [id number] Charles ([id number])  07/09/2020 3  Oxycodone Hcl 5 Mg Tablet 45.00 15 Dr Carpenter [id number] Gra ([id number]) 0/0  07/06/2020 1  Lorazepam 0.5 Mg Tablet 30.00 5 Aa Ome [id number] Charles ([id number])  06/30/2020 1  Oxycodone Hcl 5 Mg Tablet 20.00 4 Dr Carpenter [id number] Charles ([id number]) 0/0  06/22/2020 1  Lorazepam 0.5 Mg Tablet 30.00 5 Aa Ome [id number] Charles ([id number])    Interim History:  Nathalie Bashir 54 year old video visit today for follow up. She tells me that she finished chemotherapy and next month will have surgery date set for 9/16. She will have a PET scan on 9/2. Feeling like wants to get surgery over with. Docs telling her that going to remove her stomach but she doesn't have the details yet. She will meet with them after the PET scan for the details.     She finished chemo on Friday so still sick to her stomach - can't really eat, feels blah, pain on left side of stomach persists. She believes that her appetite will come back soon as that is the pattern off chemo.     Medications: (she did not know names of her medications and read them to me, omitting oxycodone)  - ondansetron under tongue every 6 hours PRN  - zofran  tablets BID for nausea -   - gasX as needed for bloating -   - probiotic BID  - famotidine    - lorazepam at night and sometimes during the day for nausea, vomiting, anxiety, sleep. Sometimes takes it for nausea pr anxiety - relaxes her.   - pantoprazole 40mg BID to help her eat   - citalopram -     Not taking - olanzapine -     When I asked Nathalie about her pain, she stated that the pain medications were doing good. Right side pain is better. Pain not as severe as in the hospital. She believes that pain due to inflammation and infection - Has oxycodone but states that not taking it everyday and then states that takes it twice a day. Pain not as bad as it was so she is glad about that. She believes that feeling better than she did and thinks that infection is better. Nathalie was very vague about her pain and how she was using her pain medications and did not mention oxycodone at all until I asked about it. She also did not mention lorazepam and when I asked about that, she tells me she takes that some nights and sometimes during the day for anxiety. She was not more specific than that.     Mood is good. Sometimes feels anxious and then takes lorazepam - thoughts of cancer makes her anxious. Never spoke with palliative care counselors.     Nausea controlled with medications.     Some diarrhea but improved. No constipation    Review of Systems:  ROS: 10 point ROS neg other than the symptoms noted above in the HPI         Allergies   Allergen Reactions     Gluten Meal Palpitations     Augmentin Rash     Patient tolerated Zosyn in 7/2020     Oxycontin [Oxycodone]      Confusion, loopy, oxycodone is tolerated     Pregabalin      interfered with Cymbalta     Topiramate      Tongue numbness, taste alteration, irritable      Acetaminophen Nausea     Urine retention       Dust Mite Extract      Gabapentin Dizziness and GI Disturbance     Latex Rash     Methadone Fatigue     Mold      Naprosyn [Naproxen] Dizziness and GI  Disturbance     Oxymetholone      Seasonal Allergies      Current Outpatient Medications   Medication Sig Dispense Refill     amitriptyline (ELAVIL) 25 MG tablet Take 3 tablets by mouth At Bedtime       citalopram (CELEXA) 20 MG tablet Take 20 mg by mouth every morning        dexamethasone (DECADRON) 4 MG tablet Take 1 tablet (4mg) the day before, day of, and 2 days after chemotherapy 12 tablet 3     diclofenac (FLECTOR) 1.3 % patch Place 1 patch onto the skin daily as needed        famotidine (PEPCID) 20 MG tablet Take 1 tablet (20 mg) by mouth 2 times daily 60 tablet 3     furosemide (LASIX) 20 MG tablet Take 0.5 tablets (10 mg) by mouth daily for 5 days 3 tablet 0     loperamide (IMODIUM) 2 MG capsule Take 4 mg by mouth 4 times daily as needed for diarrhea        LORazepam (ATIVAN) 0.5 MG tablet Take 1 tablet (0.5 mg) by mouth every 4 hours as needed (Anxiety, Nausea/Vomiting or Sleep) 30 tablet 5     meclizine (ANTIVERT) 25 MG tablet Take 1 tablet (25 mg) by mouth 3 times daily as needed for dizziness 60 tablet 0     naloxone (NARCAN) 4 MG/0.1ML nasal spray Spray 1 spray (4 mg) into one nostril alternating nostrils once as needed for opioid reversal every 2-3 minutes until assistance arrives (Patient not taking: Reported on 8/7/2020) 0.2 mL 0     ondansetron (ZOFRAN) 8 MG tablet Take 1 tablet (8 mg) by mouth every 8 hours as needed (Nausea/Vomiting) 90 tablet 3     oxyCODONE (ROXICODONE) 5 MG tablet Take 1-2 tablets (5-10 mg) by mouth every 6 hours as needed for severe pain 60 tablet 0     pantoprazole (PROTONIX) 40 MG EC tablet TAKE ONE TABLET BY MOUTH TWICE A DAY 60 tablet 1     potassium chloride ER (KLOR-CON M) 20 MEQ CR tablet Take 1 tablet (20 mEq) by mouth daily 30 tablet 1     simethicone 80 MG TABS Take 1 tablet by mouth every 6 hours as needed (bloating, gas, belching) 90 tablet 3     traMADol (ULTRAM) 50 MG tablet Take 50 mg by mouth every 6 hours as needed for severe pain       Past Medical History:    Diagnosis Date     Allergic rhinitis      Cancer (H)     stomach cancer     Depression      Lumbago      Migraine      Other chronic pain     low back     Sacroiliitis (H)     low back pain     Trochanteric bursitis     leg length discrrepancy, hip pain     Past Surgical History:   Procedure Laterality Date     ARTHROPLASTY HIP Left 2016     BACK SURGERY      L4-5 decompression     C REPAIR DETACH RETINA,SCLERAL BUCKLE       CATARACT IOL, RT/LT       CHOLECYSTECTOMY N/A 1/28/2020    Procedure: Cholecystectomy;  Surgeon: Yandel Mallory MD;  Location: [facility] OR     ENDOSCOPIC RETROGRADE CHOLANGIOPANCREATOGRAM N/A 7/27/2020    Procedure: ENDOSCOPIC RETROGRADE CHOLANGIOPANCREATOGRAPHY  **Latex Allergy** with jejunal biopsies;  Surgeon: Jeet Aviles MD;  Location: [facility] OR     ESOPHAGOSCOPY, GASTROSCOPY, DUODENOSCOPY (EGD), COMBINED N/A 3/3/2020    Procedure: ESOPHAGOGASTRODUODENOSCOPY, WITH ENDOSCOPIC US (enoxaparin);  Surgeon: Bakari Plata MD;  Location: [facility] GI     EYE SURGERY       IR CHEST PORT PLACEMENT > 5 YRS OF AGE  3/26/2020     OPEN REDUCTION INTERNAL FIXATION RODDING INTRAMEDULLARY FEMUR Left 12/23/2014    Procedure: OPEN REDUCTION INTERNAL FIXATION RODDING INTRAMEDULLARY FEMUR;  Surgeon: Arnol Santiago MD;  Location: [facility] OR     ORTHOPEDIC SURGERY       PICC DOUBLE LUMEN PLACEMENT Right 02/07/2020    5 fr double lumen 41 cm     REMOVE HARDWARE LOWER EXTREMITY Left 4/7/2015    Procedure: REMOVE HARDWARE LOWER EXTREMITY;  Surgeon: Arnol Santiago MD;  Location: [facility] OR     REMOVE HARDWARE RODDING INTRAMEDULLARY FEMUR Left 12/17/2015    Procedure: REMOVE HARDWARE RODDING INTRAMEDULLARY FEMUR;  Surgeon: Arnol Santiago MD;  Location: [facility] OR     RESECT BONE LOWER EXTREMITY Left 12/23/2014    Procedure: RESECT BONE LOWER EXTREMITY;  Surgeon: Arnol Santiago MD;  Location: [facility] OR     WHIPPLE PROCEDURE N/A 1/28/2020    Procedure: Open Whipple procedure and Cholecystectomy;  Surgeon: Yandel Mallory,  MD;  Location: [facility] OR         [facility] 09/23/2014   General: alert, appears stated age, in NAD  Eyes: EOMI, sclera clear  HEENT: NC/AT; no temporal wasting, mucous membranes moist  Lungs: no increased work of breathing, speaking full sentences  Neuro: A&O x 3; CN II-XII grossly intact; gait normal, steady  Neuropsych: engaged, affect slightly slowed; sensorium intact      Key Data Reviewed:  GFR 82; alb 2.1;       Impression & Recommendations & Counseling:    Nathalie Bashir is a 54-year-old female with incidentally found T4 N0 M0 gastric adenocarcinoma, completed neoadjuvant chemo and now with plan for completion of gastrectomy in mid september.  Continues to complain of LUQ discomfort with pain, nausea, and heartburn.     Pain: Nathalie was very vague about her oxycodone use and the pain that she is having.  She has a history of chronic neck pain and was treated a local pain clinic as far back as 3/2019 treated with oxycodone, buprenorphine tried, tramadol.  She was not able to describe the intensity of her pain but did say that it was better.  I suggested that she taper off the oxycodone before surgery and she agreed to this.  She did get a prescription filled on 8/10 for oxycodone No. 60 tablets.  I suggested that she start taking only 1 tablet of oxycodone per day x10 days and then stop it completely.   She agreed to this.  I sent a message to oncology as to this plan.    Nausea - controlled  Continue Pepcid, Protonix, Gas-X and probiotics  Continue ondansetron PRN  Pt stopped zyprexa that was started during her hospitalization    Anxiety and mood: Patient states that her mood is good but that she is occasionally anxious when she thinks about her cancer.  Looking back at her prescription use, I believe she is using Lorazepam at least twice a day and I recommended that she taper off this as well.  She agreed to this and will only take it once a day if needed over the next 2 weeks and then will stop it.  I informed her  oncology team of this plan as well.  Continue citalopram.    Follow-up 2 months      Thank you for involving us in the patient's care.   Myranda Peter MD / Palliative Medicine / Pager [phone] / After-Hours Answering Service [phone] / Main Palliative Clinic - [phone] / [facility] Inpatient Team Consult Pager [phone] (answered 8am-430pm M-F)

## 2020-08-18 NOTE — PROGRESS NOTES
"Nathalie Bashir is a 54 year old female who is being evaluated via a billable video visit.      The patient has been notified of following:     \"This video visit will be conducted via a call between you and your physician/provider. We have found that certain health care needs can be provided without the need for an in-person physical exam.  This service lets us provide the care you need with a video conversation.  If a prescription is necessary we can send it directly to your pharmacy.  If lab work is needed we can place an order for that and you can then stop by our lab to have the test done at a later time.    Video visits are billed at different rates depending on your insurance coverage.  Please reach out to your insurance provider with any questions.    If during the course of the call the physician/provider feels a video visit is not appropriate, you will not be charged for this service.\"    Patient has given verbal consent for Video visit? Yes    How would you like to obtain your AVS? MyChart    If you are dropped from the video visit, the video invite should be resent to: Text to cell phone: [phone]    Will anyone else be joining your video visit? No         I have reviewed and updated the patient's allergies and medication list.    Concerns: No new concerns.   Refills: Lorazepam.     Vitals - Patient Reported  Weight (Patient Reported): 82.6 kg (182 lb 1.6 oz)  Height (Patient Reported): 172.7 cm (5' 7.99\")  BMI (Based on Pt Reported Ht/Wt): 27.69  Pain Score: Moderate Pain (5)  Pain Loc: Abdomen        Astrid Couch CMA        Video-Visit Details    Type of service:  Video Visit    Video Start Time: 3:14 PM  Video End Time: 3:36 PM    Originating Location (pt. Location): Home    Distant Location (provider location):  [facility]     Platform used for Video Visit: [facility]    Palliative Care Outpatient Clinic Progress Note    Patient Name:  Nathalie Bashir  Primary Provider:  Carlos" Marianne    Chief Complaint / Identifying information:   Nathalie Bashir is a 55yo F with gastric adenocarcinoma              - Found during Whipple 1/2020 for presumed IPMN - no pancreatic malignancy found, but had poorly differentiated adeno ca              - Receiving neoadjuvant chemo (5FU, leucovorin, oxaliplatin, docetaxel) prior to planned curative intent surgery with Dr. Mallory. She completed all 8 cycles and is planning for surgery in September.   Hx chronic neck/back pain previously on opioids managed by a local pain clinic (Apurva Benjamin, NP in [place], prescribing oxycodone 10 mg #120/month dating back to at least 3/2019 and at some point was tried on buprenorphine). Now back on oxycodone prescribed by oncology and palliative care   Hospitalized in May and stated on olanzapine for nausea.     Last Palliative care appointment: 6/10 Dr Scott  Recommendations:   LUQ pain, GERD, nausea - discomfort in LUQ with feelings of reflux, and at times nausea.  Reflux seems most predominant.  Sucralfate did not help.   - Continue BID pepcid and protonix  - Can take Tums up to TID (says was told not to take by someone but I don't see this documented. Ca, kidney function OK)  - Continue olanzapine 5 mg QHS for nausea, with zofran PRN  - Continue tramadol 50 mg q6h PRN, refilled today     Depressed mood - stable.  Continue citalopram.       Neuropathy - worsening after chemo in hands and cold sensitivity, will get better before next cycle.  Continue chronic elavil.       Gastric cancer, following with Dr. Eagle and Dr. Mallory.  Coping well.  Plan to continue chemo through July, followed by repeat surgery.       Reviewed: yes    08/10/2020 1  Lorazepam 0.5 Mg Tablet 30.00 5 Aa Ome [id number] Charles ([id number])   08/10/2020 1  Oxycodone Hcl 5 Mg Tablet 60.00 7 Sa Pov [id number] Charles ([id number]) 0/0  08/06/2020 1  Oxycodone Hcl 5 Mg Tablet 15.00 3 Sa Pov [id number] Charles ([id number]) 0/0  08/03/2020 3  Oxycodone Hcl 5 Mg Tablet 15.00 3 Sa  Pov [id number] Gra ([id number]) 0/0  07/29/2020 1  Oxycodone Hcl 5 Mg Tablet 15.00 3 Hinds Tor [id number] Charles ([id number]) 0/0  07/20/2020 1  Lorazepam 0.5 Mg Tablet 30.00 5 Aa Ome [id number] Charles ([id number])  07/09/2020 3  Oxycodone Hcl 5 Mg Tablet 45.00 15 Dr Carpenter [id number] Gra ([id number]) 0/0  07/06/2020 1  Lorazepam 0.5 Mg Tablet 30.00 5 Aa Ome [id number] Charles ([id number])  06/30/2020 1  Oxycodone Hcl 5 Mg Tablet 20.00 4 Dr Carpenter [id number] Charles ([id number]) 0/0  06/22/2020 1  Lorazepam 0.5 Mg Tablet 30.00 5 Aa Ome [id number] Charles ([id number])    Interim History:  Nathalie Bashir 54 year old video visit today for follow up. She tells me that she finished chemotherapy and next month will have surgery date set for 9/16. She will have a PET scan on 9/2. Feeling like wants to get surgery over with. Docs telling her that going to remove her stomach but she doesn't have the details yet. She will meet with them after the PET scan for the details.     She finished chemo on Friday so still sick to her stomach - can't really eat, feels blah, pain on left side of stomach persists. She believes that her appetite will come back soon as that is the pattern off chemo.     Medications: (she did not know names of her medications and read them to me, omitting oxycodone)  - ondansetron under tongue every 6 hours PRN  - zofran tablets BID for nausea -   - gasX as needed for bloating -   - probiotic BID  - famotidine    - lorazepam at night and sometimes during the day for nausea, vomiting, anxiety, sleep. Sometimes takes it for nausea pr anxiety - relaxes her.   - pantoprazole 40mg BID to help her eat   - citalopram -     Not taking - olanzapine -     When I asked Nathalie about her pain, she stated that the pain medications were doing good. Right side pain is better. Pain not as severe as in the hospital. She believes that pain due to inflammation and infection - Has oxycodone but states that not taking it everyday and then states that takes it twice a day. Pain not as bad as it was so she is glad  about that. She believes that feeling better than she did and thinks that infection is better. Nathalie was very vague about her pain and how she was using her pain medications and did not mention oxycodone at all until I asked about it. She also did not mention lorazepam and when I asked about that, she tells me she takes that some nights and sometimes during the day for anxiety. She was not more specific than that.     Mood is good. Sometimes feels anxious and then takes lorazepam - thoughts of cancer makes her anxious. Never spoke with palliative care counselors.     Nausea controlled with medications.     Some diarrhea but improved. No constipation    Review of Systems:  ROS: 10 point ROS neg other than the symptoms noted above in the HPI         Allergies   Allergen Reactions     Gluten Meal Palpitations     Augmentin Rash     Patient tolerated Zosyn in 7/2020     Oxycontin [Oxycodone]      Confusion, loopy, oxycodone is tolerated     Pregabalin      interfered with Cymbalta     Topiramate      Tongue numbness, taste alteration, irritable      Acetaminophen Nausea     Urine retention       Dust Mite Extract      Gabapentin Dizziness and GI Disturbance     Latex Rash     Methadone Fatigue     Mold      Naprosyn [Naproxen] Dizziness and GI Disturbance     Oxymetholone      Seasonal Allergies      Current Outpatient Medications   Medication Sig Dispense Refill     amitriptyline (ELAVIL) 25 MG tablet Take 3 tablets by mouth At Bedtime       citalopram (CELEXA) 20 MG tablet Take 20 mg by mouth every morning        dexamethasone (DECADRON) 4 MG tablet Take 1 tablet (4mg) the day before, day of, and 2 days after chemotherapy 12 tablet 3     diclofenac (FLECTOR) 1.3 % patch Place 1 patch onto the skin daily as needed        famotidine (PEPCID) 20 MG tablet Take 1 tablet (20 mg) by mouth 2 times daily 60 tablet 3     furosemide (LASIX) 20 MG tablet Take 0.5 tablets (10 mg) by mouth daily for 5 days 3 tablet 0      loperamide (IMODIUM) 2 MG capsule Take 4 mg by mouth 4 times daily as needed for diarrhea        LORazepam (ATIVAN) 0.5 MG tablet Take 1 tablet (0.5 mg) by mouth every 4 hours as needed (Anxiety, Nausea/Vomiting or Sleep) 30 tablet 5     meclizine (ANTIVERT) 25 MG tablet Take 1 tablet (25 mg) by mouth 3 times daily as needed for dizziness 60 tablet 0     naloxone (NARCAN) 4 MG/0.1ML nasal spray Spray 1 spray (4 mg) into one nostril alternating nostrils once as needed for opioid reversal every 2-3 minutes until assistance arrives (Patient not taking: Reported on 8/7/2020) 0.2 mL 0     ondansetron (ZOFRAN) 8 MG tablet Take 1 tablet (8 mg) by mouth every 8 hours as needed (Nausea/Vomiting) 90 tablet 3     oxyCODONE (ROXICODONE) 5 MG tablet Take 1-2 tablets (5-10 mg) by mouth every 6 hours as needed for severe pain 60 tablet 0     pantoprazole (PROTONIX) 40 MG EC tablet TAKE ONE TABLET BY MOUTH TWICE A DAY 60 tablet 1     potassium chloride ER (KLOR-CON M) 20 MEQ CR tablet Take 1 tablet (20 mEq) by mouth daily 30 tablet 1     simethicone 80 MG TABS Take 1 tablet by mouth every 6 hours as needed (bloating, gas, belching) 90 tablet 3     traMADol (ULTRAM) 50 MG tablet Take 50 mg by mouth every 6 hours as needed for severe pain       Past Medical History:   Diagnosis Date     Allergic rhinitis      Cancer (H)     stomach cancer     Depression      Lumbago      Migraine      Other chronic pain     low back     Sacroiliitis (H)     low back pain     Trochanteric bursitis     leg length discrrepancy, hip pain     Past Surgical History:   Procedure Laterality Date     ARTHROPLASTY HIP Left 2016     BACK SURGERY      L4-5 decompression     C REPAIR DETACH RETINA,SCLERAL BUCKLE       CATARACT IOL, RT/LT       CHOLECYSTECTOMY N/A 1/28/2020    Procedure: Cholecystectomy;  Surgeon: Yandel Mallory MD;  Location: [facility] OR     ENDOSCOPIC RETROGRADE CHOLANGIOPANCREATOGRAM N/A 7/27/2020    Procedure: ENDOSCOPIC RETROGRADE  CHOLANGIOPANCREATOGRAPHY  **Latex Allergy** with jejunal biopsies;  Surgeon: Jeet Aviles MD;  Location: [facility] OR     ESOPHAGOSCOPY, GASTROSCOPY, DUODENOSCOPY (EGD), COMBINED N/A 3/3/2020    Procedure: ESOPHAGOGASTRODUODENOSCOPY, WITH ENDOSCOPIC US (enoxaparin);  Surgeon: Bakari Plata MD;  Location: [facility] GI     EYE SURGERY       IR CHEST PORT PLACEMENT > 5 YRS OF AGE  3/26/2020     OPEN REDUCTION INTERNAL FIXATION RODDING INTRAMEDULLARY FEMUR Left 12/23/2014    Procedure: OPEN REDUCTION INTERNAL FIXATION RODDING INTRAMEDULLARY FEMUR;  Surgeon: Arnol Santiago MD;  Location: [facility] OR     ORTHOPEDIC SURGERY       PICC DOUBLE LUMEN PLACEMENT Right 02/07/2020    5 fr double lumen 41 cm     REMOVE HARDWARE LOWER EXTREMITY Left 4/7/2015    Procedure: REMOVE HARDWARE LOWER EXTREMITY;  Surgeon: Arnol Santiago MD;  Location: [facility] OR     REMOVE HARDWARE RODDING INTRAMEDULLARY FEMUR Left 12/17/2015    Procedure: REMOVE HARDWARE RODDING INTRAMEDULLARY FEMUR;  Surgeon: Arnol Santiago MD;  [facility] OR     RESECT BONE LOWER EXTREMITY Left 12/23/2014    Procedure: RESECT BONE LOWER EXTREMITY;  Surgeon: Arnol Santiago MD;  Location: [facility] OR     WHIPPLE PROCEDURE N/A 1/28/2020    Procedure: Open Whipple procedure and Cholecystectomy;  Surgeon: Yandel Mallory MD;  Location: [facility] OR           LMP 09/23/2014   General: alert, appears stated age, in NAD  Eyes: EOMI, sclera clear  HEENT: NC/AT; no temporal wasting, mucous membranes moist  Lungs: no increased work of breathing, speaking full sentences  Neuro: A&O x 3; CN II-XII grossly intact; gait normal, steady  Neuropsych: engaged, affect slightly slowed; sensorium intact      Key Data Reviewed:  GFR 82; alb 2.1;       Impression & Recommendations & Counseling:    Nathalie Bashir is a 54-year-old female with incidentally found T4 N0 M0 gastric adenocarcinoma, completed neoadjuvant chemo and now with plan for completion of gastrectomy in mid september.  Continues to  complain of LUQ discomfort with pain, nausea, and heartburn.     Pain: Nathalie was very vague about her oxycodone use and the pain that she is having.  She has a history of chronic neck pain and was treated a local pain clinic as far back as 3/2019 treated with oxycodone, buprenorphine tried, tramadol.  She was not able to describe the intensity of her pain but did say that it was better.  I suggested that she taper off the oxycodone before surgery and she agreed to this.  She did get a prescription filled on 8/10 for oxycodone No. 60 tablets.  I suggested that she start taking only 1 tablet of oxycodone per day x10 days and then stop it completely.   She agreed to this.  I sent a message to oncology as to this plan.    Nausea - controlled  Continue Pepcid, Protonix, Gas-X and probiotics  Continue ondansetron PRN  Pt stopped zyprexa that was started during her hospitalization    Anxiety and mood: Patient states that her mood is good but that she is occasionally anxious when she thinks about her cancer.  Looking back at her prescription use, I believe she is using Lorazepam at least twice a day and I recommended that she taper off this as well.  She agreed to this and will only take it once a day if needed over the next 2 weeks and then will stop it.  I informed her oncology team of this plan as well.  Continue citalopram.    Follow-up 2 months      Thank you for involving us in the patient's care.   Myranda Peter MD / Palliative Medicine / Pager [phone] / After-Hours Answering Service [phone] / Main Palliative Clinic - [phone] / [facility] Inpatient Team Consult Pager [phone] (answered 8am-430pm M-F)

## 2020-08-18 NOTE — TELEPHONE ENCOUNTER
Surgery is scheduled with Dr. Mallory on 9/16 at [facility].  Scheduled per MD.    H&P: to be completed by PAC.  PAC: 9/4  PET SCAN: 9/4  POST-OP: 10/2    The surgery packet was provided via MyChart and letter. Patient does not read MyChart - letter sent in mail    Pre-op consult with Dr Mallory on 9/4.     COVID TEST - 9/13    They are aware that they will get their finalized times at their appointment with PAC.    I contacted the patient via phone and left a message to confirm the scheduled dates. Sent surgery packet in the mail with appointment information/COVID information

## 2020-08-19 NOTE — TELEPHONE ENCOUNTER
FUTURE VISIT INFORMATION      SURGERY INFORMATION:    Date: 20    Location: [facility] OR    Surgeon:  Yandel Mallory MD     Anesthesia Type:  General    Procedure: Diagnostic laparoscopy Open Total Gastrectomy with possible Feeding Jejunostomy Tube Pacement.     RECORDS REQUESTED FROM:       Primary Care Provider: Marianne Gonzalez APRN, CNP- [facility]    Most recent EKG+ Tracin20    Most recent ECHO: 20

## 2020-08-21 ENCOUNTER — TELEPHONE (OUTPATIENT)
Dept: PALLIATIVE CARE | Facility: CLINIC | Age: 55
End: 2020-08-21

## 2020-08-21 ENCOUNTER — PATIENT OUTREACH (OUTPATIENT)
Dept: PALLIATIVE CARE | Facility: CLINIC | Age: 55
End: 2020-08-21

## 2020-08-21 ENCOUNTER — TELEPHONE (OUTPATIENT)
Dept: SURGERY | Facility: CLINIC | Age: 55
End: 2020-08-21

## 2020-08-21 ENCOUNTER — TELEPHONE (OUTPATIENT)
Dept: ONCOLOGY | Facility: CLINIC | Age: 55
End: 2020-08-21

## 2020-08-21 ENCOUNTER — APPOINTMENT (OUTPATIENT)
Dept: CT IMAGING | Facility: CLINIC | Age: 55
End: 2020-08-21
Attending: EMERGENCY MEDICINE
Payer: MEDICARE

## 2020-08-21 ENCOUNTER — HOSPITAL ENCOUNTER (OUTPATIENT)
Facility: CLINIC | Age: 55
Setting detail: OBSERVATION
Discharge: HOME | End: 2020-08-23
Attending: EMERGENCY MEDICINE | Admitting: INTERNAL MEDICINE
Payer: MEDICARE

## 2020-08-21 DIAGNOSIS — C16.9 GASTRIC ADENOCARCINOMA (H): ICD-10-CM

## 2020-08-21 DIAGNOSIS — K52.9 COLITIS: ICD-10-CM

## 2020-08-21 DIAGNOSIS — C16.9 MALIGNANT NEOPLASM OF STOMACH, UNSPECIFIED LOCATION (H): ICD-10-CM

## 2020-08-21 DIAGNOSIS — Z03.818 ENCNTR FOR OBS FOR SUSP EXPSR TO OTH BIOLG AGENTS RULED OUT: ICD-10-CM

## 2020-08-21 LAB
ALBUMIN SERPL-MCNC: 2.3 G/DL (ref 3.4–5)
ALP SERPL-CCNC: 221 U/L (ref 40–150)
ALT SERPL W P-5'-P-CCNC: 23 U/L (ref 0–50)
ANION GAP SERPL CALCULATED.3IONS-SCNC: 6 MMOL/L (ref 3–14)
ANISOCYTOSIS BLD QL SMEAR: ABNORMAL
AST SERPL W P-5'-P-CCNC: 29 U/L (ref 0–45)
BASOPHILS # BLD AUTO: 0 10E9/L (ref 0–0.2)
BASOPHILS NFR BLD AUTO: 0 %
BILIRUB SERPL-MCNC: 0.5 MG/DL (ref 0.2–1.3)
BUN SERPL-MCNC: 6 MG/DL (ref 7–30)
CALCIUM SERPL-MCNC: 8.4 MG/DL (ref 8.5–10.1)
CHLORIDE SERPL-SCNC: 109 MMOL/L (ref 94–109)
CO2 SERPL-SCNC: 24 MMOL/L (ref 20–32)
CREAT SERPL-MCNC: 0.82 MG/DL (ref 0.52–1.04)
DIFFERENTIAL METHOD BLD: ABNORMAL
EOSINOPHIL # BLD AUTO: 0.2 10E9/L (ref 0–0.7)
EOSINOPHIL NFR BLD AUTO: 4 %
ERYTHROCYTE [DISTWIDTH] IN BLOOD BY AUTOMATED COUNT: 22.2 % (ref 10–15)
GFR SERPL CREATININE-BSD FRML MDRD: 81 ML/MIN/{1.73_M2}
GLUCOSE SERPL-MCNC: 119 MG/DL (ref 70–99)
HCT VFR BLD AUTO: 27.5 % (ref 35–47)
HGB BLD-MCNC: 8.3 G/DL (ref 11.7–15.7)
LACTATE BLD-SCNC: 2.4 MMOL/L (ref 0.7–2)
LIPASE SERPL-CCNC: 22 U/L (ref 73–393)
LYMPHOCYTES # BLD AUTO: 1.5 10E9/L (ref 0.8–5.3)
LYMPHOCYTES NFR BLD AUTO: 31 %
MACROCYTES BLD QL SMEAR: PRESENT
MCH RBC QN AUTO: 28 PG (ref 26.5–33)
MCHC RBC AUTO-ENTMCNC: 30.2 G/DL (ref 31.5–36.5)
MCV RBC AUTO: 93 FL (ref 78–100)
MONOCYTES # BLD AUTO: 1.4 10E9/L (ref 0–1.3)
MONOCYTES NFR BLD AUTO: 29 %
MYELOCYTES # BLD: 0 10E9/L
MYELOCYTES NFR BLD MANUAL: 1 %
NEUTROPHILS # BLD AUTO: 1.7 10E9/L (ref 1.6–8.3)
NEUTROPHILS NFR BLD AUTO: 35 %
NRBC # BLD AUTO: 0 10*3/UL
NRBC BLD AUTO-RTO: 1 /100
PLATELET # BLD AUTO: 139 10E9/L (ref 150–450)
POIKILOCYTOSIS BLD QL SMEAR: SLIGHT
POLYCHROMASIA BLD QL SMEAR: SLIGHT
POTASSIUM SERPL-SCNC: 3.5 MMOL/L (ref 3.4–5.3)
PROT SERPL-MCNC: 5.8 G/DL (ref 6.8–8.8)
RBC # BLD AUTO: 2.96 10E12/L (ref 3.8–5.2)
SODIUM SERPL-SCNC: 139 MMOL/L (ref 133–144)
WBC # BLD AUTO: 4.9 10E9/L (ref 4–11)

## 2020-08-21 PROCEDURE — 96376 TX/PRO/DX INJ SAME DRUG ADON: CPT

## 2020-08-21 PROCEDURE — 99285 EMERGENCY DEPT VISIT HI MDM: CPT | Mod: 25 | Performed by: EMERGENCY MEDICINE

## 2020-08-21 PROCEDURE — 25800030 ZZH RX IP 258 OP 636: Performed by: EMERGENCY MEDICINE

## 2020-08-21 PROCEDURE — 25000128 H RX IP 250 OP 636: Performed by: EMERGENCY MEDICINE

## 2020-08-21 PROCEDURE — 85025 COMPLETE CBC W/AUTO DIFF WBC: CPT | Performed by: EMERGENCY MEDICINE

## 2020-08-21 PROCEDURE — U0003 INFECTIOUS AGENT DETECTION BY NUCLEIC ACID (DNA OR RNA); SEVERE ACUTE RESPIRATORY SYNDROME CORONAVIRUS 2 (SARS-COV-2) (CORONAVIRUS DISEASE [COVID-19]), AMPLIFIED PROBE TECHNIQUE, MAKING USE OF HIGH THROUGHPUT TECHNOLOGIES AS DESCRIBED BY CMS-2020-01-R: HCPCS | Performed by: EMERGENCY MEDICINE

## 2020-08-21 PROCEDURE — 82565 ASSAY OF CREATININE: CPT | Performed by: STUDENT IN AN ORGANIZED HEALTH CARE EDUCATION/TRAINING PROGRAM

## 2020-08-21 PROCEDURE — 87040 BLOOD CULTURE FOR BACTERIA: CPT | Performed by: EMERGENCY MEDICINE

## 2020-08-21 PROCEDURE — 96375 TX/PRO/DX INJ NEW DRUG ADDON: CPT | Performed by: EMERGENCY MEDICINE

## 2020-08-21 PROCEDURE — 25000132 ZZH RX MED GY IP 250 OP 250 PS 637: Mod: GY | Performed by: STUDENT IN AN ORGANIZED HEALTH CARE EDUCATION/TRAINING PROGRAM

## 2020-08-21 PROCEDURE — 25000128 H RX IP 250 OP 636: Performed by: STUDENT IN AN ORGANIZED HEALTH CARE EDUCATION/TRAINING PROGRAM

## 2020-08-21 PROCEDURE — 83690 ASSAY OF LIPASE: CPT | Performed by: EMERGENCY MEDICINE

## 2020-08-21 PROCEDURE — 83605 ASSAY OF LACTIC ACID: CPT | Performed by: EMERGENCY MEDICINE

## 2020-08-21 PROCEDURE — 12000001 ZZH R&B MED SURG/OB UMMC

## 2020-08-21 PROCEDURE — 96365 THER/PROPH/DIAG IV INF INIT: CPT | Mod: 59 | Performed by: EMERGENCY MEDICINE

## 2020-08-21 PROCEDURE — 99223 1ST HOSP IP/OBS HIGH 75: CPT | Mod: AI | Performed by: STUDENT IN AN ORGANIZED HEALTH CARE EDUCATION/TRAINING PROGRAM

## 2020-08-21 PROCEDURE — 93005 ELECTROCARDIOGRAM TRACING: CPT | Performed by: EMERGENCY MEDICINE

## 2020-08-21 PROCEDURE — C9803 HOPD COVID-19 SPEC COLLECT: HCPCS | Performed by: EMERGENCY MEDICINE

## 2020-08-21 PROCEDURE — 93010 ELECTROCARDIOGRAM REPORT: CPT | Mod: Z6 | Performed by: EMERGENCY MEDICINE

## 2020-08-21 PROCEDURE — 96372 THER/PROPH/DIAG INJ SC/IM: CPT | Mod: XS

## 2020-08-21 PROCEDURE — 80053 COMPREHEN METABOLIC PANEL: CPT | Performed by: EMERGENCY MEDICINE

## 2020-08-21 PROCEDURE — 74177 CT ABD & PELVIS W/CONTRAST: CPT

## 2020-08-21 PROCEDURE — 25000125 ZZHC RX 250: Performed by: STUDENT IN AN ORGANIZED HEALTH CARE EDUCATION/TRAINING PROGRAM

## 2020-08-21 RX ORDER — SIMETHICONE 80 MG
80 TABLET,CHEWABLE ORAL EVERY 6 HOURS PRN
Status: DISCONTINUED | OUTPATIENT
Start: 2020-08-21 | End: 2020-08-23 | Stop reason: HOSPADM

## 2020-08-21 RX ORDER — SODIUM CHLORIDE 9 MG/ML
INJECTION, SOLUTION INTRAVENOUS CONTINUOUS
Status: DISCONTINUED | OUTPATIENT
Start: 2020-08-21 | End: 2020-08-21

## 2020-08-21 RX ORDER — ACETAMINOPHEN 325 MG/1
650 TABLET ORAL EVERY 4 HOURS PRN
Status: DISCONTINUED | OUTPATIENT
Start: 2020-08-21 | End: 2020-08-23 | Stop reason: HOSPADM

## 2020-08-21 RX ORDER — FAMOTIDINE 20 MG/1
20 TABLET, FILM COATED ORAL 2 TIMES DAILY
Status: DISCONTINUED | OUTPATIENT
Start: 2020-08-21 | End: 2020-08-23 | Stop reason: HOSPADM

## 2020-08-21 RX ORDER — MORPHINE SULFATE 2 MG/ML
2 INJECTION, SOLUTION INTRAMUSCULAR; INTRAVENOUS EVERY 4 HOURS PRN
Status: COMPLETED | OUTPATIENT
Start: 2020-08-21 | End: 2020-08-22

## 2020-08-21 RX ORDER — MECLIZINE HYDROCHLORIDE 25 MG/1
25 TABLET ORAL 3 TIMES DAILY PRN
Status: DISCONTINUED | OUTPATIENT
Start: 2020-08-21 | End: 2020-08-23 | Stop reason: HOSPADM

## 2020-08-21 RX ORDER — ONDANSETRON 2 MG/ML
4 INJECTION INTRAMUSCULAR; INTRAVENOUS EVERY 6 HOURS PRN
Status: DISCONTINUED | OUTPATIENT
Start: 2020-08-21 | End: 2020-08-23 | Stop reason: HOSPADM

## 2020-08-21 RX ORDER — OXYCODONE HYDROCHLORIDE 5 MG/1
5-10 TABLET ORAL
Status: DISCONTINUED | OUTPATIENT
Start: 2020-08-21 | End: 2020-08-23 | Stop reason: HOSPADM

## 2020-08-21 RX ORDER — IOPAMIDOL 755 MG/ML
112 INJECTION, SOLUTION INTRAVASCULAR ONCE
Status: COMPLETED | OUTPATIENT
Start: 2020-08-21 | End: 2020-08-21

## 2020-08-21 RX ORDER — PANTOPRAZOLE SODIUM 40 MG/1
40 TABLET, DELAYED RELEASE ORAL 2 TIMES DAILY
Status: DISCONTINUED | OUTPATIENT
Start: 2020-08-21 | End: 2020-08-23 | Stop reason: HOSPADM

## 2020-08-21 RX ORDER — MORPHINE SULFATE 4 MG/ML
4 INJECTION, SOLUTION INTRAMUSCULAR; INTRAVENOUS
Status: COMPLETED | OUTPATIENT
Start: 2020-08-21 | End: 2020-08-21

## 2020-08-21 RX ORDER — OXYCODONE HYDROCHLORIDE 5 MG/1
5-10 TABLET ORAL EVERY 6 HOURS PRN
Qty: 60 TABLET | Refills: 0 | Status: SHIPPED | OUTPATIENT
Start: 2020-08-21 | End: 2020-09-02

## 2020-08-21 RX ORDER — ONDANSETRON 2 MG/ML
4 INJECTION INTRAMUSCULAR; INTRAVENOUS EVERY 30 MIN PRN
Status: DISCONTINUED | OUTPATIENT
Start: 2020-08-21 | End: 2020-08-21

## 2020-08-21 RX ORDER — NALOXONE HYDROCHLORIDE 0.4 MG/ML
.1-.4 INJECTION, SOLUTION INTRAMUSCULAR; INTRAVENOUS; SUBCUTANEOUS
Status: DISCONTINUED | OUTPATIENT
Start: 2020-08-21 | End: 2020-08-23 | Stop reason: HOSPADM

## 2020-08-21 RX ORDER — ONDANSETRON 4 MG/1
4 TABLET, ORALLY DISINTEGRATING ORAL EVERY 6 HOURS PRN
Status: DISCONTINUED | OUTPATIENT
Start: 2020-08-21 | End: 2020-08-23 | Stop reason: HOSPADM

## 2020-08-21 RX ORDER — PIPERACILLIN SODIUM, TAZOBACTAM SODIUM 4; .5 G/20ML; G/20ML
4.5 INJECTION, POWDER, LYOPHILIZED, FOR SOLUTION INTRAVENOUS EVERY 8 HOURS
Status: DISCONTINUED | OUTPATIENT
Start: 2020-08-22 | End: 2020-08-23

## 2020-08-21 RX ORDER — LIDOCAINE 40 MG/G
CREAM TOPICAL
Status: DISCONTINUED | OUTPATIENT
Start: 2020-08-21 | End: 2020-08-23 | Stop reason: HOSPADM

## 2020-08-21 RX ORDER — PIPERACILLIN SODIUM, TAZOBACTAM SODIUM 4; .5 G/20ML; G/20ML
4.5 INJECTION, POWDER, LYOPHILIZED, FOR SOLUTION INTRAVENOUS ONCE
Status: COMPLETED | OUTPATIENT
Start: 2020-08-21 | End: 2020-08-21

## 2020-08-21 RX ORDER — CITALOPRAM HYDROBROMIDE 20 MG/1
20 TABLET ORAL EVERY MORNING
Status: DISCONTINUED | OUTPATIENT
Start: 2020-08-22 | End: 2020-08-23 | Stop reason: HOSPADM

## 2020-08-21 RX ADMIN — ONDANSETRON 4 MG: 2 INJECTION INTRAMUSCULAR; INTRAVENOUS at 19:48

## 2020-08-21 RX ADMIN — AMITRIPTYLINE HYDROCHLORIDE 75 MG: 25 TABLET, FILM COATED ORAL at 23:06

## 2020-08-21 RX ADMIN — IOPAMIDOL 112 ML: 755 INJECTION, SOLUTION INTRAVENOUS at 19:12

## 2020-08-21 RX ADMIN — HYDROMORPHONE HYDROCHLORIDE 1 MG: 1 INJECTION, SOLUTION INTRAMUSCULAR; INTRAVENOUS; SUBCUTANEOUS at 20:50

## 2020-08-21 RX ADMIN — SODIUM CHLORIDE 1000 ML: 9 INJECTION, SOLUTION INTRAVENOUS at 20:53

## 2020-08-21 RX ADMIN — ENOXAPARIN SODIUM 40 MG: 40 INJECTION SUBCUTANEOUS at 23:08

## 2020-08-21 RX ADMIN — MORPHINE SULFATE 2 MG: 2 INJECTION, SOLUTION INTRAMUSCULAR; INTRAVENOUS at 23:07

## 2020-08-21 RX ADMIN — PIPERACILLIN SODIUM AND TAZOBACTAM SODIUM 4.5 G: 4; .5 INJECTION, POWDER, LYOPHILIZED, FOR SOLUTION INTRAVENOUS at 20:50

## 2020-08-21 RX ADMIN — MORPHINE SULFATE 4 MG: 4 INJECTION INTRAVENOUS at 19:49

## 2020-08-21 ASSESSMENT — MIFFLIN-ST. JEOR
SCORE: 1428.7
SCORE: 1494.93

## 2020-08-21 NOTE — PROGRESS NOTES
Pt called back inquiring about the progress of her request. I let her know that Martine had received it and was discussing with the care team. Pt verbalized understanding.

## 2020-08-21 NOTE — TELEPHONE ENCOUNTER
Patient had called to report that she required a refill of Oxycodone/    On calling Nathalie she states that pain to right > left abdomen has intensified over the last 24 hours, She denies diarrhea, she has had a normal BM today but reports some nausea without vomiting she has been able to drink but does not have an appetite.    She states that she has been requiring Oxycodone on a regular basis since discharge from hospital however since yesterday has been taking 2 tablets every 4 hours and is now out of them.    I will contact Dr Peter for next steps       Per  - 08/07  Oxycodone 5mgs 60 tabs

## 2020-08-21 NOTE — TELEPHONE ENCOUNTER
Surgery has been rescheduled to 9/22 with Dr. Mallory at [facility].  Per RN, moved the surgery.    Spoke with Nathalie regarding this change, she is accepting of this.    Rescheduled her COVID test to 9/18 at 11 AM at the [facility].     Rescheduled post-op to 10/9 at 9 AM.    Sent updated surgery packet via [employer]

## 2020-08-21 NOTE — TELEPHONE ENCOUNTER
Patient with increasing abdominal pain x 24 hrs. Going to the ED for an evaluation as earlier in the week, pain was well controlled, only using oxycodone 2-3 times a day but past 24 hours she has been using it every 4 hours. She is out of her oxycodone and I will refill if no acute findings in the ED.

## 2020-08-21 NOTE — PROGRESS NOTES
"Received VM from patient - she states that due to increased abdominal pain she has had to take 2 oxycodone and is now out. She requests refill.     I see in Epic patient also called ONC triage to request this - who rightfully directed her to palliative care.     Per MD's last note from 8/18, \"Pain: Nathalie was very vague about her oxycodone use and the pain that she is having.  She has a history of chronic neck pain and was treated a local pain clinic as far back as 3/2019 treated with oxycodone, buprenorphine tried, tramadol.  She was not able to describe the intensity of her pain but did say that it was better.  I suggested that she taper off the oxycodone before surgery and she agreed to this.  She did get a prescription filled on 8/10 for oxycodone No. 60 tablets.  I suggested that she start taking only 1 tablet of oxycodone per day x10 days and then stop it completely.   She agreed to this.  I sent a message to oncology as to this plan.\"    Will check with MD as she met with MD earlier this week and spoke about this plan and this pain.  "

## 2020-08-21 NOTE — ED TRIAGE NOTES
54Y F - presenting to ED for eval of banding pain (motions left to right, over abdomen) x 2-3 days.  HX:  Stomach CA.  Patient states same/similar pain, previously, was noted to be an infection.

## 2020-08-21 NOTE — TELEPHONE ENCOUNTER
"Writer received call from patient requesting refill of her oxycodone for abdominal pain. Patient states she took her last oxycodone this morning and is completely out. Writer advised that she needs to discuss refills for this medication with her palliative team and transferred her call to Martine with the advice that Martine is not likely \"at work\" at this time yet and to leave a thorough VM for her. Patient voiced understanding. Care team updated as palliative is talking point on prescription of her oxycodone and lorazepam.  "

## 2020-08-21 NOTE — ED PROVIDER NOTES
History     Chief Complaint   Patient presents with     Abdominal Pain     HX:  stomach CA; concern for infection     HPI  Nathalie Bashir is a 54 year old female with a past medical history of stomach cancer, depression, migraines, chronic low back pain, recent admission for colitis who presents to the emergency department with a chief complaint of abdominal pain.  It is located primarily on the right side, but radiates throughout her entire abdomen.  Is associated with diarrhea.  Patient has some occasional bloody stools, but this is not atypical for her as she states she has hemorrhoids.  She has some nausea without vomiting.  Denies any fevers or chills.  No sick contacts.  The patient notes that she was recently treated with antibiotics during her last admission.  There was some thought that this colitis was either infectious or inflammatory due to her chemotherapy regimen.  Symptoms are similar to the last episode that she had.  The patient recently finished her chemotherapy regimen.  This time the pain has been present for approximately 2 to 3 days.  It is afebrile on arrival to the emergency department.    I have reviewed the Medications, Allergies, Past Medical and Surgical History, and Social History in the [facility] system.    Past Medical History:   Diagnosis Date     Allergic rhinitis      Cancer (H)     stomach cancer     Depression      Lumbago      Migraine      Other chronic pain     low back     Sacroiliitis (H)     low back pain     Trochanteric bursitis     leg length discrrepancy, hip pain     Past Surgical History:   Procedure Laterality Date     ARTHROPLASTY HIP Left 2016     BACK SURGERY      L4-5 decompression     C REPAIR DETACH RETINA,SCLERAL BUCKLE       CATARACT IOL, RT/LT       CHOLECYSTECTOMY N/A 1/28/2020    Procedure: Cholecystectomy;  Surgeon: Yandel Mallory MD;  Location: [facility] OR     ENDOSCOPIC RETROGRADE CHOLANGIOPANCREATOGRAM N/A 7/27/2020    Procedure: ENDOSCOPIC RETROGRADE  CHOLANGIOPANCREATOGRAPHY  **Latex Allergy** with jejunal biopsies;  Surgeon: Jeet Aviles MD;  Location: [facility] OR     ESOPHAGOSCOPY, GASTROSCOPY, DUODENOSCOPY (EGD), COMBINED N/A 3/3/2020    Procedure: ESOPHAGOGASTRODUODENOSCOPY, WITH ENDOSCOPIC US (enoxaparin);  Surgeon: Bakari Plata MD;  Location: [facility] GI     EYE SURGERY       IR CHEST PORT PLACEMENT > 5 YRS OF AGE  3/26/2020     OPEN REDUCTION INTERNAL FIXATION RODDING INTRAMEDULLARY FEMUR Left 12/23/2014    Procedure: OPEN REDUCTION INTERNAL FIXATION RODDING INTRAMEDULLARY FEMUR;  Surgeon: Arnol Santiago MD;  Location: [facility]     ORTHOPEDIC SURGERY       PICC DOUBLE LUMEN PLACEMENT Right 02/07/2020    5 fr double lumen 41 cm     REMOVE HARDWARE LOWER EXTREMITY Left 4/7/2015    Procedure: REMOVE HARDWARE LOWER EXTREMITY;  Surgeon: Arnol Santiago MD;  Location: [facility]     REMOVE HARDWARE RODDING INTRAMEDULLARY FEMUR Left 12/17/2015    Procedure: REMOVE HARDWARE RODDING INTRAMEDULLARY FEMUR;  Surgeon: Arnol Santiago MD;  [facility]     RESECT BONE LOWER EXTREMITY Left 12/23/2014    Procedure: RESECT BONE LOWER EXTREMITY;  Surgeon: Arnol Santiago MD;  Location: [facility]     WHIPPLE PROCEDURE N/A 1/28/2020    Procedure: Open Whipple procedure and Cholecystectomy;  Surgeon: Yandel Mallory MD;  Location: [facility] OR     No current facility-administered medications for this encounter.      Current Outpatient Medications   Medication     amitriptyline (ELAVIL) 25 MG tablet     citalopram (CELEXA) 20 MG tablet     dexamethasone (DECADRON) 4 MG tablet     diclofenac (FLECTOR) 1.3 % patch     famotidine (PEPCID) 20 MG tablet     furosemide (LASIX) 20 MG tablet     loperamide (IMODIUM) 2 MG capsule     LORazepam (ATIVAN) 0.5 MG tablet     meclizine (ANTIVERT) 25 MG tablet     naloxone (NARCAN) 4 MG/0.1ML nasal spray     ondansetron (ZOFRAN) 8 MG tablet     oxyCODONE (ROXICODONE) 5 MG tablet     pantoprazole (PROTONIX) 40 MG EC tablet     potassium  chloride ER (KLOR-CON M) 20 MEQ CR tablet     simethicone 80 MG TABS     traMADol (ULTRAM) 50 MG tablet     Allergies   Allergen Reactions     Gluten Meal Palpitations     Augmentin Rash     Patient tolerated Zosyn in 2020     Oxycontin [Oxycodone]      Confusion, loopy, oxycodone is tolerated     Pregabalin      interfered with Cymbalta     Topiramate      Tongue numbness, taste alteration, irritable      Acetaminophen Nausea     Urine retention       D[place] Extract      Gabapentin Dizziness and GI Disturbance     Latex Rash     Methadone Fatigue     Mold      Naprosyn [Naproxen] Dizziness and GI Disturbance     Oxymetholone      Seasonal Allergies      Past medical history, past surgical history, medications, and allergies were reviewed with the patient. Additional pertinent items: None    Social History     Socioeconomic History     Marital status:      Spouse name: Not on file     Number of children: Not on file     Years of education: Not on file     Highest education level: Not on file   Occupational History     Not on file   Social Needs     Financial resource strain: Not on file     Food insecurity     Worry: Not on file     Inability: Not on file     Transportation needs     Medical: Not on file     Non-medical: Not on file   Tobacco Use     Smoking status: Former Smoker     Packs/day: 0.50     Years: 25.00     Pack years: 12.50     Types: Cigarettes     Last attempt to quit: 2020     Years since quittin.6     Smokeless tobacco: Never Used   Substance and Sexual Activity     Alcohol use: No     Drug use: No     Sexual activity: Not on file   Lifestyle     Physical activity     Days per week: Not on file     Minutes per session: Not on file     Stress: Not on file   Relationships     Social connections     Talks on phone: Not on file     Gets together: Not on file     Attends Jainism service: Not on file     Active member of club or organization: Not on file     Attends meetings of  "clubs or organizations: Not on file     Relationship status: Not on file     Intimate partner violence     Fear of current or ex partner: Not on file     Emotionally abused: Not on file     Physically abused: Not on file     Forced sexual activity: Not on file   Other Topics Concern     Parent/sibling w/ CABG, MI or angioplasty before 65F 55M? Not Asked   Social History Narrative     Not on file     Social history was reviewed with the patient. Additional pertinent items: None    Review of Systems  General: No fevers or chills  Skin: No rash or diaphoresis  Eyes: No eye redness or discharge  Ears/Nose/Throat: No rhinorrhea or nasal congestion  Respiratory: No cough or SOB  Cardiovascular: No chest pain or palpitations  Gastrointestinal: See HPI  Genitourinary: No urinary frequency, hematuria, or dysuria  Musculoskeletal: No arthralgias or myalgias  Neurologic: No numbness or weakness  Psychiatric: No depression or SI  Hematologic/Lymphatic/Immunologic: No leg swelling, no easy bruising/bleeding  Endocrine: No polyuria/polydypsia    A complete review of systems was performed with pertinent positives and negatives noted in the HPI, and all other systems negative.    Physical Exam   BP: 110/60  Pulse: 117  Temp: 98.2  F (36.8  C)  Resp: 16  Height: 175.3 cm (5' 9\")  Weight: 82.6 kg (182 lb)  SpO2: 99 %      General: Well nourished, well developed, NAD  HEENT: EOMI, anicteric. NCAT, mildly dry mucous membranes  Neck: no jugular venous distension, supple, nl ROM  Cardiac: Echocardiac rate, regular rhythm. No murmurs, rubs, or gallops. Normal S1, S2.  Intact peripheral pulses  Pulm: CTAB, no stridor, wheezes, rales, rhonchi  Abd: Soft, tenderness to palpation over the right side of the abdomen without rebound or guarding, nondistended.  No masses palpated.    Skin: Warm and dry to the touch.  No rash  Extremities: No LE edema, no cyanosis, w/w/p  Neuro: A&Ox3, no gross focal deficits    ED Course        Procedures         "              EKG Interpretation:      Interpreted by Karina Solorzano MD  Time reviewed:1850   Symptoms at time of EKG: abdominal pain    Rhythm: normal sinus rhythm with short VT  Rate: normal  Axis: NORMAL  Ectopy: none  Conduction: short VT, otherwise normal  ST Segments/ T Waves: No ST-T wave changes  Q Waves: none  Comparison to prior: Unchanged from 7/25/2020, other than resolution of previously seen tachycardia    Clinical Impression: normal EKG               Labs Ordered and Resulted from Time of ED Arrival Up to the Time of Departure from the ED   CBC WITH PLATELETS DIFFERENTIAL - Abnormal; Notable for the following components:       Result Value    RBC Count 2.96 (*)     Hemoglobin 8.3 (*)     Hematocrit 27.5 (*)     MCHC 30.2 (*)     RDW 22.2 (*)     Platelet Count 139 (*)     Nucleated RBCs 1 (*)     Absolute Monocytes 1.4 (*)     All other components within normal limits   COMPREHENSIVE METABOLIC PANEL - Abnormal; Notable for the following components:    Glucose 119 (*)     Urea Nitrogen 6 (*)     Calcium 8.4 (*)     Albumin 2.3 (*)     Protein Total 5.8 (*)     Alkaline Phosphatase 221 (*)     All other components within normal limits   LIPASE - Abnormal; Notable for the following components:    Lipase 22 (*)     All other components within normal limits   LACTIC ACID WHOLE BLOOD - Abnormal; Notable for the following components:    Lactic Acid 2.4 (*)     All other components within normal limits   COVID-19 VIRUS (CORONAVIRUS) BY PCR   BLOOD CULTURE   BLOOD CULTURE            Results for orders placed or performed during the hospital encounter of 08/21/20 (from the past 24 hour(s))   CBC with platelets differential   Result Value Ref Range    WBC 4.9 4.0 - 11.0 10e9/L    RBC Count 2.96 (L) 3.8 - 5.2 10e12/L    Hemoglobin 8.3 (L) 11.7 - 15.7 g/dL    Hematocrit 27.5 (L) 35.0 - 47.0 %    MCV 93 78 - 100 fl    MCH 28.0 26.5 - 33.0 pg    MCHC 30.2 (L) 31.5 - 36.5 g/dL    RDW 22.2 (H) 10.0 - 15.0 %     Platelet Count 139 (L) 150 - 450 10e9/L    Diff Method Manual Differential     % Neutrophils 35.0 %    % Lymphocytes 31.0 %    % Monocytes 29.0 %    % Eosinophils 4.0 %    % Basophils 0.0 %    % Myelocytes 1.0 %    Nucleated RBCs 1 (H) 0 /100    Absolute Neutrophil 1.7 1.6 - 8.3 10e9/L    Absolute Lymphocytes 1.5 0.8 - 5.3 10e9/L    Absolute Monocytes 1.4 (H) 0.0 - 1.3 10e9/L    Absolute Eosinophils 0.2 0.0 - 0.7 10e9/L    Absolute Basophils 0.0 0.0 - 0.2 10e9/L    Absolute Myelocytes 0.0 0 10e9/L    Absolute Nucleated RBC 0.0     Anisocytosis Moderate     Poikilocytosis Slight     Polychromasia Slight     Macrocytes Present    Comprehensive metabolic panel   Result Value Ref Range    Sodium 139 133 - 144 mmol/L    Potassium 3.5 3.4 - 5.3 mmol/L    Chloride 109 94 - 109 mmol/L    Carbon Dioxide 24 20 - 32 mmol/L    Anion Gap 6 3 - 14 mmol/L    Glucose 119 (H) 70 - 99 mg/dL    Urea Nitrogen 6 (L) 7 - 30 mg/dL    Creatinine 0.82 0.52 - 1.04 mg/dL    GFR Estimate 81 >60 mL/min/[1.73_m2]    GFR Estimate If Black >90 >60 mL/min/[1.73_m2]    Calcium 8.4 (L) 8.5 - 10.1 mg/dL    Bilirubin Total 0.5 0.2 - 1.3 mg/dL    Albumin 2.3 (L) 3.4 - 5.0 g/dL    Protein Total 5.8 (L) 6.8 - 8.8 g/dL    Alkaline Phosphatase 221 (H) 40 - 150 U/L    ALT 23 0 - 50 U/L    AST 29 0 - 45 U/L   Lipase   Result Value Ref Range    Lipase 22 (L) 73 - 393 U/L   Lactic acid whole blood   Result Value Ref Range    Lactic Acid 2.4 (H) 0.7 - 2.0 mmol/L   EKG 12-lead, tracing only   Result Value Ref Range    Interpretation ECG Click View Image link to view waveform and result    CT Abdomen Pelvis w Contrast    Narrative    EXAMINATION: CT ABDOMEN PELVIS W CONTRAST, 8/21/2020 7:27 PM    TECHNIQUE:  Helical CT images from the lung bases through the  symphysis pubis were obtained with IV contrast. Contrast dose:  iopamidol (ISOVUE-370) solution 112 mL    COMPARISON: CT abdomen pelvis 7/28/2020    HISTORY: abdominal pain, hx cancer    Additional history  obtained from EMR: 54-year-old female with a  history of poorly differentiated gastric adenocarcinoma on  chemotherapy (Taxotere, 5-Follow-up, oxaliplatinum) pancreatic cyst  status post Whipple's procedure (1/28/2020).     FINDINGS:    Abdomen and pelvis: Postoperative changes of Whipple. Small amount of  central pneumobilia and small amount of gas pancreatic duct in body.  No focal liver lesions. There is mild enhancement of the wall of the  common bile duct (5, image 136), similar to the previous exam. The  residual pancreas is unremarkable. Spleen and adrenal glands are  unremarkable. Both kidneys are unremarkable. Hydronephrosis or  hydroureter. No renal or ureteral stones. Streak artifact from the  left hip arthroplasty slightly limits evaluation of the pelvis. There  is diffuse wall thickening of the urinary bladder, with enhancement.  Colonic diverticulosis, without convincing evidence of acute  diverticulitis. There is wall thickening of the cecum and the rectum.  Appendix is unremarkable. Small amount of free fluid in the abdomen  and pelvis, slightly increased compared to the previous exam on  7/28/2020. No free intraperitoneal air.    There is a 1.8 cm soft tissue nodule in the right upper quadrant of  the abdomen, abutting the abdominal wall (series 2, image 39), which  is unchanged in size compared to 7/28/2020. Subcentimeter  retroperitoneal lymph nodes are not significantly changed in size  compared to the previous exam. The abdominal aorta is normal in  caliber. The major intra-abdominal vasculature.    Lung bases: Heart is normal in size. No pericardial effusion.  Subsegmental atelectasis in the posterior right lower lobe.    Bones and soft tissues: Diffuse soft tissue anasarca. Postsurgical  changes of left total hip arthroplasty. Left sacroiliac fusion device  with bony fusion in the central portion of the sacroiliac joint. There  are no acute or suspicious osseous lesions. Degenerative changes  of  the spine.      Impression    IMPRESSION:   1. There is mild wall thickening of the cecum and rectum, concerning  for colitis.   2. Thickening of the bladder wall with urothelial enhancement  concerning for cystitis.  2. Postsurgical changes of Whipple procedure.   3. 1.8 cm soft tissue nodule in the right upper quadrant of the  abdomen, abutting the abdominal wall, concerning for metastasis. This  is unchanged in size compared to 7/28/2020.  4. Unchanged mild enhancement of the common bile duct, which may  represent cholangitis. Small central pneumobilia and gas in the main  pancreatic duct.  5. Small volume ascites in the abdomen and pelvis, slightly increased  compared to the previous exam.    I have personally reviewed the examination and initial interpretation  and I agree with the findings.    YORDY DE LEON MD       Labs, vital signs, and imaging studies were reviewed by me.    Medications   amitriptyline (ELAVIL) tablet 75 mg (75 mg Oral Given 8/21/20 2306)   citalopram (celeXA) tablet 20 mg (has no administration in time range)   famotidine (PEPCID) tablet 20 mg (20 mg Oral Given 8/21/20 2306)   meclizine (ANTIVERT) tablet 25 mg (has no administration in time range)   pantoprazole (PROTONIX) EC tablet 40 mg (40 mg Oral Given 8/21/20 2306)   simethicone (MYLICON) chewable tablet 80 mg (has no administration in time range)   lidocaine 1 % 0.1-1 mL (has no administration in time range)   lidocaine (LMX4) cream (has no administration in time range)   sodium chloride (PF) 0.9% PF flush 3 mL (has no administration in time range)   sodium chloride (PF) 0.9% PF flush 3 mL (3 mLs Intracatheter Given 8/21/20 2308)   naloxone (NARCAN) injection 0.1-0.4 mg (has no administration in time range)   melatonin tablet 1 mg (has no administration in time range)   enoxaparin ANTICOAGULANT (LOVENOX) injection 40 mg (40 mg Subcutaneous Given 8/21/20 2308)   acetaminophen (TYLENOL) tablet 650 mg (has no administration in  time range)   oxyCODONE (ROXICODONE) tablet 5-10 mg (has no administration in time range)   ondansetron (ZOFRAN-ODT) ODT tab 4 mg (has no administration in time range)     Or   ondansetron (ZOFRAN) injection 4 mg (has no administration in time range)   piperacillin-tazobactam (ZOSYN) 4.5 g vial to attach to  mL bag (has no administration in time range)   morphine (PF) injection 2 mg (2 mg Intravenous Given 8/21/20 2307)   iopamidol (ISOVUE-370) solution 112 mL (112 mLs Intravenous Given 8/21/20 1912)   sodium chloride (PF) 0.9% PF flush 78 mL (78 mLs Intravenous Given 8/21/20 1912)   0.9% sodium chloride BOLUS (0 mLs Intravenous Stopped 8/21/20 2120)   morphine (PF) injection 4 mg (4 mg Intravenous Given 8/21/20 1949)   piperacillin-tazobactam (ZOSYN) 4.5 g vial to attach to  mL bag (0 g Intravenous Stopped 8/21/20 2120)   HYDROmorphone (DILAUDID) injection 1 mg (1 mg Intravenous Given 8/21/20 2050)       Assessments & Plan (with Medical Decision Making)   Nathalie Bashir is a 54 year old female who presents with abdominal pain.  Labs are unremarkable.  CT shows findings consistent with colitis located in the cecum.  This correlates with patient's area of pain.  Antibiotics were ordered to cover for potential infectious colitis.  Lactate 2.4.  IV fluids were given.  Patient was also provided with medications for pain and nausea control while in the emergency department.  Patient did require multiple doses of IV pain medications to control her pain while in the ER.    White blood cell count is within normal limits.  Hemoglobin is 8.3.    I have reviewed the nursing notes.    I have reviewed the findings, diagnosis, plan and need for follow up with the patient.    Patient discussed with internal medicine, to be admitted to their service for further management. Plan was discussed with patient who understands and agrees with plan.     Current Discharge Medication List          Final diagnoses:   Colitis        8/21/2020   [facility], [place], EMERGENCY DEPARTMENT     Karina Solorzano MD  08/21/20 3686

## 2020-08-21 NOTE — TELEPHONE ENCOUNTER
Dr Peter is concerned that Nathalie is experiencing increased abdominal pain and would like her to be evaluated, I will contact her Oncology team with updates.     Call back to Nathalie - she agrees that she needs to have the change in her abdominal pain evaluated and will go tot the emergency room at [facility]

## 2020-08-22 LAB
SARS-COV-2 RNA SPEC QL NAA+PROBE: NOT DETECTED
SPECIMEN SOURCE: NORMAL

## 2020-08-22 PROCEDURE — 96376 TX/PRO/DX INJ SAME DRUG ADON: CPT

## 2020-08-22 PROCEDURE — 25000125 ZZHC RX 250: Performed by: STUDENT IN AN ORGANIZED HEALTH CARE EDUCATION/TRAINING PROGRAM

## 2020-08-22 PROCEDURE — 96372 THER/PROPH/DIAG INJ SC/IM: CPT

## 2020-08-22 PROCEDURE — 25000128 H RX IP 250 OP 636: Performed by: STUDENT IN AN ORGANIZED HEALTH CARE EDUCATION/TRAINING PROGRAM

## 2020-08-22 PROCEDURE — 99221 1ST HOSP IP/OBS SF/LOW 40: CPT | Performed by: INTERNAL MEDICINE

## 2020-08-22 PROCEDURE — 25000125 ZZHC RX 250: Performed by: INTERNAL MEDICINE

## 2020-08-22 PROCEDURE — 12000001 ZZH R&B MED SURG/OB UMMC

## 2020-08-22 PROCEDURE — 99233 SBSQ HOSP IP/OBS HIGH 50: CPT | Performed by: INTERNAL MEDICINE

## 2020-08-22 PROCEDURE — 25000132 ZZH RX MED GY IP 250 OP 250 PS 637: Mod: GY | Performed by: STUDENT IN AN ORGANIZED HEALTH CARE EDUCATION/TRAINING PROGRAM

## 2020-08-22 RX ORDER — MORPHINE SULFATE 2 MG/ML
1-2 INJECTION, SOLUTION INTRAMUSCULAR; INTRAVENOUS EVERY 4 HOURS PRN
Status: COMPLETED | OUTPATIENT
Start: 2020-08-22 | End: 2020-08-22

## 2020-08-22 RX ADMIN — MORPHINE SULFATE 2 MG: 2 INJECTION, SOLUTION INTRAMUSCULAR; INTRAVENOUS at 03:07

## 2020-08-22 RX ADMIN — PANTOPRAZOLE SODIUM 40 MG: 40 TABLET, DELAYED RELEASE ORAL at 20:25

## 2020-08-22 RX ADMIN — CITALOPRAM HYDROBROMIDE 20 MG: 20 TABLET, FILM COATED ORAL at 08:10

## 2020-08-22 RX ADMIN — OXYCODONE HYDROCHLORIDE 5 MG: 5 TABLET ORAL at 00:29

## 2020-08-22 RX ADMIN — AMITRIPTYLINE HYDROCHLORIDE 75 MG: 25 TABLET, FILM COATED ORAL at 21:46

## 2020-08-22 RX ADMIN — ENOXAPARIN SODIUM 40 MG: 40 INJECTION SUBCUTANEOUS at 23:22

## 2020-08-22 RX ADMIN — PIPERACILLIN AND TAZOBACTAM 4.5 G: 4; .5 INJECTION, POWDER, FOR SOLUTION INTRAVENOUS at 20:24

## 2020-08-22 RX ADMIN — ONDANSETRON 4 MG: 2 INJECTION INTRAMUSCULAR; INTRAVENOUS at 08:16

## 2020-08-22 RX ADMIN — FAMOTIDINE 20 MG: 20 TABLET ORAL at 08:10

## 2020-08-22 RX ADMIN — OXYCODONE HYDROCHLORIDE 10 MG: 5 TABLET ORAL at 04:45

## 2020-08-22 RX ADMIN — OXYCODONE HYDROCHLORIDE 10 MG: 5 TABLET ORAL at 15:01

## 2020-08-22 RX ADMIN — PIPERACILLIN AND TAZOBACTAM 4.5 G: 4; .5 INJECTION, POWDER, FOR SOLUTION INTRAVENOUS at 11:25

## 2020-08-22 RX ADMIN — PIPERACILLIN AND TAZOBACTAM 4.5 G: 4; .5 INJECTION, POWDER, FOR SOLUTION INTRAVENOUS at 03:08

## 2020-08-22 RX ADMIN — OXYCODONE HYDROCHLORIDE 10 MG: 5 TABLET ORAL at 11:55

## 2020-08-22 RX ADMIN — MORPHINE SULFATE 2 MG: 2 INJECTION, SOLUTION INTRAMUSCULAR; INTRAVENOUS at 11:13

## 2020-08-22 RX ADMIN — PANTOPRAZOLE SODIUM 40 MG: 40 TABLET, DELAYED RELEASE ORAL at 08:10

## 2020-08-22 RX ADMIN — SIMETHICONE 80 MG: 80 TABLET, CHEWABLE ORAL at 17:33

## 2020-08-22 RX ADMIN — OXYCODONE HYDROCHLORIDE 10 MG: 5 TABLET ORAL at 08:10

## 2020-08-22 RX ADMIN — FAMOTIDINE 20 MG: 20 TABLET ORAL at 20:25

## 2020-08-22 RX ADMIN — ONDANSETRON 4 MG: 2 INJECTION INTRAMUSCULAR; INTRAVENOUS at 17:30

## 2020-08-22 RX ADMIN — SIMETHICONE 80 MG: 80 TABLET, CHEWABLE ORAL at 23:21

## 2020-08-22 RX ADMIN — OXYCODONE HYDROCHLORIDE 10 MG: 5 TABLET ORAL at 23:21

## 2020-08-22 RX ADMIN — OXYCODONE HYDROCHLORIDE 10 MG: 5 TABLET ORAL at 20:25

## 2020-08-22 RX ADMIN — MORPHINE SULFATE 2 MG: 2 INJECTION, SOLUTION INTRAMUSCULAR; INTRAVENOUS at 16:25

## 2020-08-22 ASSESSMENT — ACTIVITIES OF DAILY LIVING (ADL)
ADLS_ACUITY_SCORE: 13
ADLS_ACUITY_SCORE: 13
COGNITION: 0 - NO COGNITION ISSUES REPORTED
AMBULATION: 1-->ASSISTIVE EQUIPMENT
TOILETING: 0-->INDEPENDENT
DRESS: 0-->INDEPENDENT
ADLS_ACUITY_SCORE: 13
ADLS_ACUITY_SCORE: 13
ADLS_ACUITY_SCORE: 14
TRANSFERRING: 1-->ASSISTIVE EQUIPMENT
BATHING: 0-->INDEPENDENT
RETIRED_COMMUNICATION: 0-->UNDERSTANDS/COMMUNICATES WITHOUT DIFFICULTY
RETIRED_EATING: 0-->INDEPENDENT
FALL_HISTORY_WITHIN_LAST_SIX_MONTHS: YES
ADLS_ACUITY_SCORE: 13
WHICH_OF_THE_ABOVE_FUNCTIONAL_RISKS_HAD_A_RECENT_ONSET_OR_CHANGE?: AMBULATION
SWALLOWING: 0-->SWALLOWS FOODS/LIQUIDS WITHOUT DIFFICULTY

## 2020-08-22 NOTE — CONSULTS
GASTROENTEROLOGY CONSULTATION      Date of Admission:  8/21/2020           Reason for Consultation:   We were asked by Dr Lamar of Internal Medicine to evaluate this patient with possible cholangitis         ASSESSMENT AND RECOMMENDATIONS:   Assessment:   54 year old female with a history of gastric adenocarcinoma (discovered after Whipple procedure for pancreatic cyst), on neoadjuvant chemotherapy, recent admission for acute cholangitis the end of July, status post ERCP with mildly narrowed hepaticojejunostomy and distal common bile duct with dilation to 6 mm,, otherwise unremarkable cholangiography, was readmitted with right lower quadrant abdominal pain and concerns for cholangitis.  She had a CT scan that shows thickening and enhancement of bile duct, this seems to be very similar to her prior imaging.  Her liver labs show mildly elevated alk phos which is not new, normal bilirubin and normal remainder of the LFTs.  She has no leukocytosis and no fevers.  Blood cultures are negative to date.  At this time, concern for cholangitis is low.    Recommendations  -Symptomatic management of her right upper quadrant abdominal pain  -Continue to trend LFTs  -For mild possible colitis, would rule out infectious etiologies since he is having some loose stools  -No plans for urgent ERCP at this time  -If cultures are negative in 24 hours, could watch her off of antibiotics to see if her symptoms change    Gastroenterology outpatient follow up recommendations: TBD    Thank you for involving us in this patient's care. Please do not hesitate to contact the GI service with any questions or concerns.     Pt care plan discussed with Dr. Liu, GI staff physician.    Sheryl RUDD MD  Gastroenterology Fellow  Division of Gastroenterology, Hepatology and Nutrition  [facility]  Text page Pager  [phone]    -------------------------------------------------------------------------------------------------------------------           History of Present Illness:   Nathalie Bashir is a 54 year old female with a history of gastric adenocarcinoma (discovered after Whipple procedure for pancreatic cyst), on neoadjuvant chemotherapy, recent admission for acute cholangitis the end of July, status post ERCP with mildly narrowed hepaticojejunostomy and distal common bile duct with dilation to 6 mm,, otherwise unremarkable cholangiography, was readmitted with right lower quadrant abdominal pain and concerns for cholangitis.    Her last chemotherapy 1 week prior to admission.  Following chemotherapy she had nausea, diarrhea, fatigue, which improved.  3 days ago, she started having right-sided abdominal pain with nausea and loose stools again.  Progressively got worse, prompting her to come to the hospital.  She describes it as focal pain on the right upper quadrant with no radiation.  Does report some change in the quality of pain with movement, but cannot replicate pain with any movement.  Denies any nausea or vomiting.  Has not been having any fevers, chills.  No rigors.  She does report having some loose stools, however these are unchanged.  Denies any hematochezia or melena.  No jaundice.  Feels very similar to pain when she had it in July.  At that time, she underwent ERCP with suspicion of cholangitis, does not remember if ERCP completely helped her pain or not.    On admission CT, she continues to have unchanged mild enhancement of the common bile duct which was noted in her last CT last month as well.  There is also mild thickening of the rectum and cecum, but her bowel habits are unchanged.         Data:   Key relevant labs:   Basic metabolic panel shows overall normal electrolytes with normal renal function.  Liver panel shows slightly low protein at 5.8 low albumin at 2.3, normal bilirubin at 0.5, elevated alk phos  at 221, normal AST and ALT.  Lactic acid was elevated 2.4  Lipase is 22  White count of 4.9, hemoglobin of 8.3, platelets of 139    Blood cultures have been negative to date    No enteric panel available      Key relevant imaging:  IMPRESSION:   1. There is mild wall thickening of the cecum and rectum, concerning  for colitis.   2. Thickening of the bladder wall with urothelial enhancement  concerning for cystitis.  2. Postsurgical changes of Whipple procedure.   3. 1.8 cm soft tissue nodule in the right upper quadrant of the  abdomen, abutting the abdominal wall, concerning for metastasis. This  is unchanged in size compared to 7/28/2020.  4. Unchanged mild enhancement of the common bile duct, which may  represent cholangitis. Small central pneumobilia and gas in the main  pancreatic duct.  5. Small volume ascites in the abdomen and pelvis, slightly increased  compared to the previous exam.             Previous Endoscopy:   ERCP 7/27/2020  Impression:          - No evidence of persisent or recurrent mucosal                        malignancy                        - Healthy appearing traditional Whipple anatomy save for                        diffuse, nonspecific biliary limb small erosions,                        sampled by biopsy                        - Mildly narrowed HJ and distal common duct, though this                        appeared benign by direct cholangioscopy, and was                        dilated to 6mm with excellent effect                        - Otherwise grossly unremarkable cholangiography          Medications:     Medications Prior to Admission   Medication Sig Dispense Refill Last Dose     amitriptyline (ELAVIL) 25 MG tablet Take 3 tablets by mouth At Bedtime   8/20/2020 at Unknown time     citalopram (CELEXA) 20 MG tablet Take 20 mg by mouth every morning    8/20/2020 at Unknown time     famotidine (PEPCID) 20 MG tablet Take 1 tablet (20 mg) by mouth 2 times daily 60 tablet 3 8/20/2020 at  "Unknown time     loperamide (IMODIUM) 2 MG capsule Take 4 mg by mouth 4 times daily as needed for diarrhea    8/20/2020 at Unknown time     LORazepam (ATIVAN) 0.5 MG tablet Take 1 tablet (0.5 mg) by mouth every 4 hours as needed (Anxiety, Nausea/Vomiting or Sleep) 30 tablet 5 Past Week at Unknown time     meclizine (ANTIVERT) 25 MG tablet Take 1 tablet (25 mg) by mouth 3 times daily as needed for dizziness 60 tablet 0 8/20/2020 at Unknown time     ondansetron (ZOFRAN) 8 MG tablet Take 1 tablet (8 mg) by mouth every 8 hours as needed (Nausea/Vomiting) 90 tablet 3 8/21/2020 at Unknown time     oxyCODONE (ROXICODONE) 5 MG tablet Take 1-2 tablets (5-10 mg) by mouth every 6 hours as needed for severe pain 60 tablet 0 8/21/2020 at Unknown time     pantoprazole (PROTONIX) 40 MG EC tablet TAKE ONE TABLET BY MOUTH TWICE A DAY 60 tablet 1 8/21/2020 at Unknown time     potassium chloride ER (KLOR-CON M) 20 MEQ CR tablet Take 1 tablet (20 mEq) by mouth daily 30 tablet 1 8/21/2020 at Unknown time     dexamethasone (DECADRON) 4 MG tablet Take 1 tablet (4mg) the day before, day of, and 2 days after chemotherapy 12 tablet 3  at finished     diclofenac (FLECTOR) 1.3 % patch Place 1 patch onto the skin daily as needed    Unknown at Unknown time     furosemide (LASIX) 20 MG tablet Take 0.5 tablets (10 mg) by mouth daily for 5 days 3 tablet 0      naloxone (NARCAN) 4 MG/0.1ML nasal spray Spray 1 spray (4 mg) into one nostril alternating nostrils once as needed for opioid reversal every 2-3 minutes until assistance arrives 0.2 mL 0 Unknown at Unknown time     simethicone 80 MG TABS Take 1 tablet by mouth every 6 hours as needed (bloating, gas, belching) 90 tablet 3      traMADol (ULTRAM) 50 MG tablet Take 50 mg by mouth every 6 hours as needed for severe pain   Unknown at Unknown time             Physical Exam:   BP 99/57 (BP Location: Right arm)   Pulse 105   Temp 98.9  F (37.2  C) (Oral)   Resp 18   Ht 1.753 m (5' 9\")   Wt 75.9 " kg (167 lb 6.4 oz)   LMP 09/23/2014   SpO2 93%   BMI 24.72 kg/m    Wt:   Wt Readings from Last 2 Encounters:   08/21/20 75.9 kg (167 lb 6.4 oz)   08/14/20 82.6 kg (182 lb)      Constitutional: cooperative, pleasant, not dyspneic/diaphoretic, no acute distress  Eyes: Sclera anicteric/injected  Ears/nose/mouth/throat: Normal oropharynx without ulcers or exudate, mucus membranes moist, hearing intact  Neck: supple, thyroid normal size  CV: No edema  Respiratory: Unlabored breathing  Lymph: No axillary, submandibular, supraclavicular or inguinal lymphadenopathy  Abd:  Nondistended, +bs, no hepatosplenomegaly, nontender, no peritoneal signs  Skin: warm, perfused, no jaundice  Neuro: AAO x 3, No asterixis  Psych: Normal affect  MSK: No gross deformities          Past Medical History:   Reviewed and edited as appropriate  Past Medical History:   Diagnosis Date     Allergic rhinitis      Cancer (H)     stomach cancer     Depression      Lumbago      Migraine      Other chronic pain     low back     Sacroiliitis (H)     low back pain     Trochanteric bursitis     leg length discrrepancy, hip pain            Past Surgical History:   Reviewed and edited as appropriate   Past Surgical History:   Procedure Laterality Date     ARTHROPLASTY HIP Left 2016     BACK SURGERY      L4-5 decompression     C REPAIR DETACH RETINA,SCLERAL BUCKLE       CATARACT IOL, RT/LT       CHOLECYSTECTOMY N/A 1/28/2020    Procedure: Cholecystectomy;  Surgeon: Yandel Mallory MD;  Location:  OR     ENDOSCOPIC RETROGRADE CHOLANGIOPANCREATOGRAM N/A 7/27/2020    Procedure: ENDOSCOPIC RETROGRADE CHOLANGIOPANCREATOGRAPHY  **Latex Allergy** with jejunal biopsies;  Surgeon: Jeet Aviles MD;  Location:  OR     ESOPHAGOSCOPY, GASTROSCOPY, DUODENOSCOPY (EGD), COMBINED N/A 3/3/2020    Procedure: ESOPHAGOGASTRODUODENOSCOPY, WITH ENDOSCOPIC US (enoxaparin);  Surgeon: Bakari Plata MD;  [facility]     EYE SURGERY       IR CHEST PORT  PLACEMENT > 5 YRS OF AGE  3/26/2020     OPEN REDUCTION INTERNAL FIXATION RODDING INTRAMEDULLARY FEMUR Left 12/23/2014    Procedure: OPEN REDUCTION INTERNAL FIXATION RODDING INTRAMEDULLARY FEMUR;  Surgeon: Arnol Santiago MD;  [facility]     ORTHOPEDIC SURGERY       PICC DOUBLE LUMEN PLACEMENT Right 02/07/2020    5 fr double lumen 41 cm     REMOVE HARDWARE LOWER EXTREMITY Left 4/7/2015    Procedure: REMOVE HARDWARE LOWER EXTREMITY;  Surgeon: Aronl Santiago MD;  [facility]     REMOVE HARDWARE RODDING INTRAMEDULLARY FEMUR Left 12/17/2015    Procedure: REMOVE HARDWARE RODDING INTRAMEDULLARY FEMUR;  Surgeon: Arnol Santiago MD;  [facility]     RESECT BONE LOWER EXTREMITY Left 12/23/2014    Procedure: RESECT BONE LOWER EXTREMITY;  Surgeon: Arnol Santiago MD;  [facility]     WHIPPLE PROCEDURE N/A 1/28/2020    Procedure: Open Whipple procedure and Cholecystectomy;  Surgeon: Yandel Mallory MD;  [facility]            Social History:   Alcohol use: Denies any alcohol use  Smoking history: Does not smoke  Living situation: No other drug use         Family History:   Reviewed and edited as appropriate  Family History   Problem Relation Age of Onset     Heart Failure Mother 77     Anesthesia Reaction No family hx of      Deep Vein Thrombosis No family hx of       No known history of colorectal cancer, liver disease, or inflammatory bowel disease.         Allergies:   Reviewed and edited as appropriate     Allergies   Allergen Reactions     Gluten Meal Palpitations     Augmentin Rash     Patient tolerated Zosyn in 7/2020     Oxycontin [Oxycodone]      Confusion, loopy, oxycodone is tolerated     Pregabalin      interfered with Cymbalta     Topiramate      Tongue numbness, taste alteration, irritable      Acetaminophen Nausea     Urine retention       Dust Mite Extract      Gabapentin Dizziness and GI Disturbance     Latex Rash     Methadone Fatigue     Mold      Naprosyn [Naproxen] Dizziness  and GI Disturbance     Oxymetholone      Seasonal Allergies               Review of Systems:     A complete 10 point review of systems was performed and is negative except as noted in the HPI

## 2020-08-22 NOTE — H&P
[facility]    History and Physical - Hospitalist Service, [facility]       Date of Admission:  8/21/2020    Assessment & Plan   Nathalie Bashir is a 54 year old woman with gastric adenocarcinoma, stage IIB pT4aN0 s/p Whipple on neoadjuvant chemo (Taxotere, 5-FU, oxaliplatin) last administered 1 week prior to admission, admitted for colitis infectious vs. Chemotherapy-associated.     # Colitis   # Elevated lactate  # Gastric Adenocarcinoma, stage IIB s/p Whipple, neoadjuvant chemo  Tachycardic, elevated lactate likely secondary to colitis shown on CT. Possibly infectious, particularly with recent cholangitis requiring ERCP. Preliminary read of CT notes unchanged mild enhancement of common bile duct and small pneumobilia, but suspect this is normal with recent manipulation. Lipase, LFTs within normal limits.   - IV Zosyn  - Blood cultures pending  - Consider GI consult in AM  - NPO @ midnight  - Repeat lactate upon arrival to the floor  - mIVF 100ml/h for 1 L  - Pain control with oxycodone PRN, IV morphine PRN for breakthrough pain  - PRN Zofran   - PTA famotidine, simethicone, pantoprazole    Chronic Issues  # Insomnia: PTA amitriptyline 25mg  # Depression: PTA citalopram 20mg       Diet: Regular, NPO @ midnight  DVT Prophylaxis: Enoxaparin (Lovenox) SQ  Hernandez Catheter: not present  Code Status: Full         Disposition Plan   Expected discharge: 2 - 3 days, recommended to prior living arrangement once adequate pain management/ tolerating PO medications and antibiotic plan established.  Entered: Sparkle Herrera MD 08/21/2020, 8:41 PM     The patient's care was discussed with the Patient.    Sparkle Herrera MD  [facility]  Pager: [phone]  Please see sticky note for cross cover information  ______________________________________________________________________    Chief Complaint   Abdominal pain    History is obtained from the  patient    History of Present Illness   Nathalie Bashir is a 54 year old woman with h/o pancreatic cyst (first noted 11/2018) subsequently diagnosed as gastric adenocarcinoma, stage IIB pT4aN0 s/p Whipple and cholecystectomy (1/28) on neoadjuvant chemo (Taxotere, 5-FU, oxaliplatin) since 3/2020. 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 Plan is for curative surgery September. Her last round of chemotherapy was 1 week prior to admission. The following days she had her usual post-chemo symptoms including nausea, diarrhea, fatigue which improved up until 3 days prior to admission when she developed right-sided abdominal pain associated with nausea and loose stools. Symptoms were worst overnight prior to admission. Loose stools every 2-3 hours - brown, small amount of bright red blood which is usual for her. Slight epigastric pain. Did not vomit. Nausea was worse with eating. No suprapubic pain, flank pain, dysuria, hematuria. No fever, rash, swelling, rectal pain.     Of note, had a fall while getting up from the bathroom 4 days prior to admission during which she hit the right side of her head. Currently does not have headache, dizziness, imbalance. Did not lose consciousness.       Review of Systems    The 10 point Review of Systems is negative other than noted in the HPI or here.     Past Medical History    I have reviewed this patient's medical history and updated it with pertinent information if needed.   Past Medical History:   Diagnosis Date     Allergic rhinitis      Cancer (H)     stomach cancer     Depression      Lumbago      Migraine      Other chronic pain     low back     Sacroiliitis (H)     low back pain     Trochanteric bursitis     leg length discrrepancy, hip pain       Past Surgical History   I have reviewed this patient's surgical history and  updated it with pertinent information if needed.  Past Surgical History:   Procedure Laterality Date     ARTHROPLASTY HIP Left 2016     BACK SURGERY      L4-5 decompression     C REPAIR DETACH RETINA,SCLERAL BUCKLE       CATARACT IOL, RT/LT       CHOLECYSTECTOMY N/A 1/28/2020    Procedure: Cholecystectomy;  Surgeon: Yandel Mallory MD;  [facility]     ENDOSCOPIC RETROGRADE CHOLANGIOPANCREATOGRAM N/A 7/27/2020    Procedure: ENDOSCOPIC RETROGRADE CHOLANGIOPANCREATOGRAPHY  **Latex Allergy** with jejunal biopsies;  Surgeon: Jeet Aviles MD;  [facility]     ESOPHAGOSCOPY, GASTROSCOPY, DUODENOSCOPY (EGD), COMBINED N/A 3/3/2020    Procedure: ESOPHAGOGASTRODUODENOSCOPY, WITH ENDOSCOPIC US (enoxaparin);  Surgeon: Bakari Plata MD;  [facility]     EYE SURGERY       IR CHEST PORT PLACEMENT > 5 YRS OF AGE  3/26/2020     OPEN REDUCTION INTERNAL FIXATION RODDING INTRAMEDULLARY FEMUR Left 12/23/2014    Procedure: OPEN REDUCTION INTERNAL FIXATION RODDING INTRAMEDULLARY FEMUR;  Surgeon: Arnol Santiago MD;  [facility]     ORTHOPEDIC SURGERY       PICC DOUBLE LUMEN PLACEMENT Right 02/07/2020    5 fr double lumen 41 cm     REMOVE HARDWARE LOWER EXTREMITY Left 4/7/2015    Procedure: REMOVE HARDWARE LOWER EXTREMITY;  Surgeon: Arnol Santiago MD;  [facility]     REMOVE HARDWARE RODDING INTRAMEDULLARY FEMUR Left 12/17/2015    Procedure: REMOVE HARDWARE RODDING INTRAMEDULLARY FEMUR;  Surgeon: Arnol Santiago MD;  [facility]     RESECT BONE LOWER EXTREMITY Left 12/23/2014    Procedure: RESECT BONE LOWER EXTREMITY;  Surgeon: Arnol Santiago MD;  [facility]     WHIPPLE PROCEDURE N/A 1/28/2020    Procedure: Open Whipple procedure and Cholecystectomy;  Surgeon: Yandel Mallory MD;  [facility]       Social History   I have reviewed this patient's social history and updated it with pertinent information if needed.  Social History     Tobacco Use     Smoking status: Former Smoker      Packs/day: 0.50     Years: 25.00     Pack years: 12.50     Types: Cigarettes     Last attempt to quit: 2020     Years since quittin.6     Smokeless tobacco: Never Used   Substance Use Topics     Alcohol use: No     Drug use: No       Family History   I have reviewed this patient's family history and updated it with pertinent information if needed.  Family History   Problem Relation Age of Onset     Heart Failure Mother 77     Anesthesia Reaction No family hx of      Deep Vein Thrombosis No family hx of        Prior to Admission Medications   Prior to Admission Medications   Prescriptions Last Dose Informant Patient Reported? Taking?   LORazepam (ATIVAN) 0.5 MG tablet   No No   Sig: Take 1 tablet (0.5 mg) by mouth every 4 hours as needed (Anxiety, Nausea/Vomiting or Sleep)   amitriptyline (ELAVIL) 25 MG tablet   Yes No   Sig: Take 3 tablets by mouth At Bedtime   citalopram (CELEXA) 20 MG tablet   Yes No   Sig: Take 20 mg by mouth every morning    dexamethasone (DECADRON) 4 MG tablet   No No   Sig: Take 1 tablet (4mg) the day before, day of, and 2 days after chemotherapy   diclofenac (FLECTOR) 1.3 % patch   Yes No   Sig: Place 1 patch onto the skin daily as needed    famotidine (PEPCID) 20 MG tablet   No No   Sig: Take 1 tablet (20 mg) by mouth 2 times daily   furosemide (LASIX) 20 MG tablet   No No   Sig: Take 0.5 tablets (10 mg) by mouth daily for 5 days   loperamide (IMODIUM) 2 MG capsule   Yes No   Sig: Take 4 mg by mouth 4 times daily as needed for diarrhea    meclizine (ANTIVERT) 25 MG tablet   No No   Sig: Take 1 tablet (25 mg) by mouth 3 times daily as needed for dizziness   naloxone (NARCAN) 4 MG/0.1ML nasal spray   No No   Sig: Spray 1 spray (4 mg) into one nostril alternating nostrils once as needed for opioid reversal every 2-3 minutes until assistance arrives   ondansetron (ZOFRAN) 8 MG tablet   No No   Sig: Take 1 tablet (8 mg) by mouth every 8 hours as needed (Nausea/Vomiting)   oxyCODONE  (ROXICODONE) 5 MG tablet   No No   Sig: Take 1-2 tablets (5-10 mg) by mouth every 6 hours as needed for severe pain   pantoprazole (PROTONIX) 40 MG EC tablet   No No   Sig: TAKE ONE TABLET BY MOUTH TWICE A DAY   potassium chloride ER (KLOR-CON M) 20 MEQ CR tablet   No No   Sig: Take 1 tablet (20 mEq) by mouth daily   simethicone 80 MG TABS   No No   Sig: Take 1 tablet by mouth every 6 hours as needed (bloating, gas, belching)   traMADol (ULTRAM) 50 MG tablet   Yes No   Sig: Take 50 mg by mouth every 6 hours as needed for severe pain      Facility-Administered Medications: None     Allergies   Allergies   Allergen Reactions     Gluten Meal Palpitations     Augmentin Rash     Patient tolerated Zosyn in 7/2020     Oxycontin [Oxycodone]      Confusion, loopy, oxycodone is tolerated     Pregabalin      interfered with Cymbalta     Topiramate      Tongue numbness, taste alteration, irritable      Acetaminophen Nausea     Urine retention       Dust Mite Extract      Gabapentin Dizziness and GI Disturbance     Latex Rash     Methadone Fatigue     Mold      Naprosyn [Naproxen] Dizziness and GI Disturbance     Oxymetholone      Seasonal Allergies        Physical Exam   Vital Signs: Temp: 98.2  F (36.8  C) Temp src: Oral BP: 112/70 Pulse: 99   Resp: 16 SpO2: 98 % O2 Device: None (Room air)    Weight: 182 lbs 0 oz  Appearance: Alert and appropriate sitting up in bed  Eyes: right periorbital ecchymosis without edema. PERRL, EOM grossly intact, conjunctivae and sclerae clear.   Nose: Nares clear with no active discharge.    Mouth/Throat: Moist mucous membranes. No oral lesions, pharynx clear with no erythema or exudate.  Neck: port in right upper chest c/d/i  Pulmonary: Normal work of breathing on room air. Good air entry, clear to auscultation bilaterally, with no rales, rhonchi, or wheezing.  Cardiovascular: tachycardic, normal S1 and S2, with no murmurs.  Normal symmetric peripheral pulses and brisk cap refill.  Abdominal:  Normoactive bowel sounds, soft, right-sided tenderness and voluntary guarding, mild pain in left abdomen with deep palpation. No flank tenderness or suprapubic tenderness  Neurologic: Alert, speech fluid with normal articulation, face symmetric, moving all extremities equally with grossly normal coordination.  Ext: no pedal edema  Skin: No significant rashes, ecchymoses, or lacerations.      Data   Data reviewed today: I reviewed all medications, new labs and imaging results over the last 24 hours. I personally reviewed the abdominal CT image(s) showing colitis in the cecum.    Recent Labs   Lab 08/21/20  1755   WBC 4.9   HGB 8.3*   MCV 93   *      POTASSIUM 3.5   CHLORIDE 109   CO2 24   BUN 6*   CR 0.82   ANIONGAP 6   PETRA 8.4*   *   ALBUMIN 2.3*   PROTTOTAL 5.8*   BILITOTAL 0.5   ALKPHOS 221*   ALT 23   AST 29   LIPASE 22*

## 2020-08-22 NOTE — UTILIZATION REVIEW
"  Admission Status; Secondary Review Determination         Under the authority of the Utilization Management Committee, the utilization review process indicated a secondary review on the above patient.  The review outcome is based on review of the medical records, discussions with staff, and applying clinical experience noted on the date of the review.        (x)      Inpatient Status Appropriate - This patient's medical care is consistent with medical management for inpatient care and reasonable inpatient medical practice.      () Observation Status Appropriate - This patient does not meet hospital inpatient criteria and is placed in observation status. If this patient's primary payer is Medicare and was admitted as an inpatient, Condition Code 44 should be used and patient status changed to \"observation\".   () Admission Status NOT Appropriate - This patient's medical care is not consistent with medical management for Inpatient or Observation Status.          RATIONALE FOR DETERMINATION   The patient is a 54-year-old female who was admitted through the emergency room yesterday on 8/21/2020 with a history of stomach cancer and is status post Whipple procedure.  She was seen at the end of July of this year for obstruction of her common bile duct and cholangitis and required an ERCP.  No stent was placed at that time.  She came into the emergency room yesterday with increasing abdominal pain and the CT scan done at that time does show some signs of cholangitis.  She does have an elevated lactate of 2.4.  White count is normal.  She was started on Zosyn.  She was also noted to have cecal and rectal wall thickening consistent with colitis.  The case was discussed with Dr. Lamar, her hospitalist.  GI is consulted regarding the need for possible repeat ERCP for stent placement for common bile duct.  Consult is pending.    Inpatient status is recommended at this time.     A second review for determination of observation " status may be required tomorrow if GI does not feel that an ERCP or intervention is needed and if the patient improves clinically..        The severity of illness, intensity of service provided, expected LOS and risk for adverse outcome make the care complex, high risk and appropriate for hospital admission.        The information on this document is developed by the utilization review team in order for the business office to ensure compliance.  This only denotes the appropriateness of proper admission status and does not reflect the quality of care rendered.         The definitions of Inpatient Status and Observation Status used in making the determination above are those provided in the CMS Coverage Manual, Chapter 1 and Chapter 6, section 70.4.      Sincerely,     Tj Will MD  Physician Advisor  Utilization Review/ Case Management  [facility].

## 2020-08-22 NOTE — SUMMARY OF CARE
Pt arrived to  with sandals, grey shirt, black pants, grey sweatshirt, cell phone with , glasses, hat, cane, ID and credit card(s) in phone case. Pt declines to send anything to security.

## 2020-08-22 NOTE — PROGRESS NOTES
[facility]    History and Physical - Hospitalist Service, Gold Night       Date of Admission:  8/21/2020    Assessment & Plan   Nathalie Bashir is a 54 year old woman with gastric adenocarcinoma, stage IIB pT4aN0 s/p Whipple on neoadjuvant chemo (Taxotere, 5-FU, oxaliplatin) last administered 1 week prior to admission, admitted for colitis infectious vs. Chemotherapy-associated.     # Abd pain  # Colitis   # Elevated lactate  Tachycardic, elevated lactate likely secondary to colitis shown on CT. Possibly infectious, particularly with recent cholangitis requiring ERCP and dilation of duct. Preliminary read of CT notes unchanged mild enhancement of common bile duct and small pneumobilia, but suspect this is normal with recent manipulation. Lipase, LFTs within normal limits.   - IV Zosyn continued for possible cholangitis  - GI consult   - Pain control with oxycodone PRN, IV morphine PRN for breakthrough pain  - PTA famotidine, simethicone, pantoprazole    # Gastric Adenocarcinoma, stage IIB s/p Whipple, neoadjuvant chemo  Consult Onc    Chronic Issues  # Insomnia: PTA amitriptyline 25mg  # Depression: PTA citalopram 20mg       Diet: Regular, Stopping IVF for now  DVT Prophylaxis: Enoxaparin (Lovenox) SQ  Hernandez Catheter: not present  Code Status: Full         Disposition Plan   Expected discharge: 2 - 3 days, recommended to prior living arrangement once adequate pain management/ tolerating PO medications and antibiotic plan established.  Entered: Mika Lamar MD 08/22/2020, 2:08 PM     The patient's care was discussed with the Patient.    Mika Lamar MD  [facility], [place]  Pager: [phone]  Please see sticky note for cross cover information  ______________________________________________________________________      Subjective  Still has some abd pain, but not as bad as last admission  Pain improves with morphine  Still have loose still  but this is common for her after chemo  No blood in stool  No issues overnight but is hungry    ROS  No chest pain  No SOB  No headache  No fevers  No rash      Physical Exam   Vital Signs: Temp: 98.9  F (37.2  C) Temp src: Oral BP: 99/57 Pulse: 105   Resp: 18 SpO2: 93 % O2 Device: None (Room air)    Weight: 167 lbs 6.4 oz  Appearance: Alert and appropriate sitting up in bed  Eyes: right periorbital ecchymosis without edema. PERRL, EOM grossly intact, conjunctivae and sclerae clear.   Nose: Nares clear with no active discharge.    Mouth/Throat: Moist mucous membranes. No oral lesions, pharynx clear with no erythema or exudate.  Neck: port in right upper chest c/d/i  Pulmonary: Normal work of breathing on room air. Good air entry, clear to auscultation bilaterally, with no rales, rhonchi, or wheezing.  Cardiovascular: tachycardic, normal S1 and S2, with no murmurs.  Normal symmetric peripheral pulses and brisk cap refill.  Abdominal: Normoactive bowel sounds, soft, right-sided tenderness. No flank tenderness or suprapubic tenderness  Neurologic: Alert, speech fluid with normal articulation, face symmetric, moving all extremities equally with grossly normal coordination.  Ext: no pedal edema  Skin: No significant rashes, ecchymoses, or lacerations.      Data   Data reviewed today: I reviewed all medications, new labs and imaging results over the last 24 hours. I personally reviewed the abdominal CT image(s) showing colitis in the cecum.    Recent Labs   Lab 08/21/20  1755   WBC 4.9   HGB 8.3*   MCV 93   *      POTASSIUM 3.5   CHLORIDE 109   CO2 24   BUN 6*   CR 0.82   ANIONGAP 6   PETRA 8.4*   *   ALBUMIN 2.3*   PROTTOTAL 5.8*   BILITOTAL 0.5   ALKPHOS 221*   ALT 23   AST 29   LIPASE 22*

## 2020-08-22 NOTE — SUMMARY OF CARE
Admitted to floor at 2230    PLAN: Reason for admission: Colitis/pain control    Admitted from: ED    Report received from:   Charge RN Marii took report from ED RN    2 RN skin assessment completed by: Yesy RN and Ramon RN    Bed Algorithm reevaluated: Y    Was Pulsate ordered?:No    Belongings: Patient preferred to keep in room

## 2020-08-22 NOTE — PLAN OF CARE
Pt a/o x 3. VSS on RA except some hypotension which is pt's baseline, she reports no symptoms when up to bathroom with cane. Voiding adequately. Enteric precautions in place, waiting for stool sample. R port accessed and zosyn infusing q 6 hours. Oxycodone and morphine given for pain control for right upper abdominal pain. Poor to fair appetite with intermittent nausea, no emesis. Zofran given x 2. Possible discharge to home tomorrow. Will continue plan of care.

## 2020-08-22 NOTE — CONSULTS
Oncology  Consult Note   Date of Service: 08/22/2020    Patient: Nathalie Bashir  MRN: [id number]  Admission Date: 8/21/2020  Hospital Day # 1  Cancer Diagnosis: Gastric cancer, status post Whipple, on FLOT chemotherapy  Primary Outpatient Oncologist: Dr. Pollard  Current Treatment Plan: FLOT     Reason for Consult: Abdominal pain and nausea    Assessment & Plan:   Nathalie Bashir is a 54 year old female with gastric cancer status post Whipple and 8 cycles of  FLOT chemotherapy. She is admitted for abdominal pain and nausea and CT-scan findings of mild colitis.     Recommendations:   - Agree with pain medications and PTA GI medications  - Agree with Zosyn to cover enteric pathogens while blood cultures mature, likely can be discontinued next 24 hours    HEME/ONC  # Gastric adenocarcinoma, stage IIB s/p Whipple and 8 cycles of FLOT chemotherapy  # Right upper quadrant abdominal pain  # Colonic diverticulosis  # Colitis, mild  Patient has completed her last planned cycle of FLOT chemotherapy. She received all 8 cycles of FLOT chemotherapy post-operatively. She is experiencing usual toxicities associated with chemotherapy I.e. nausea, loose stools. Given her recent admission for cholangitis and right upper quadrant pain, agree with Zosyn coverage while blood cultures mature. However, clinically she appears well. Bilirubin is 0.5, alkaline phosphatase is about 1.7X ULN, and white count are normal, which is reassuring overall.  -continue Zosyn  -follow blood cultures  -continue pain and PTA GI medications      Thank you for the opportunity to partake in this patients plan of care. Please do not hesitate to page with questions. We will continue to follow.    ___________________________________________________________________     Subjective & Interval History:    Nathalie Bashir is a 54 year old woman with gastric adenocarcinoma, stage IIB pT4aN0 s/p Whipple and cholecystectomy (1/28) on her last cycle of adjuvant FLOT (Taxotere,  "5-FU, oxaliplatin) since 3/2020. 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     Following her last chemotherapy she had nausea, some loose stools, and fatigue which improved up until 3 days prior to admission when she notes development of right-sided abdominal pain associated with nausea. Symptoms were worst last night and included epigastric pain and nausea.     She was started on Zosyn for concerns of enteric bacterial infection. Currently patient feels well. Pain is not as bad as last admission and and improved on morphine. She denies any suprapubic pain, flank pain, dysuria, hematuria. No fever, rash, swelling, rectal pain. She is hungry and requests a normal diet.      Physical Exam:    Blood pressure 99/57, pulse 105, temperature 98.9  F (37.2  C), temperature source Oral, resp. rate 18, height 1.753 m (5' 9\"), weight 75.9 kg (167 lb 6.4 oz), last menstrual period 09/23/2014, SpO2 93 %, not currently breastfeeding.  General: alert and cooperative, lying in bed, no acute distress  HEENT: sclera anicteric, EOMI, MMM  Neck: supple, normal ROM  CV: Tachycardic, no murmurs  Resp: CTAB, normal respiratory effort on ambient air  GI: soft, tender in right upper abdomen, bowel sounds present  MSK: warm and well-perfused, normal tone  Skin: no rashes on limited exam, no jaundice  Neuro: Alert and interactive, moves all extremities equally, no focal deficits    Past Medical History:  Past Medical History:   Diagnosis Date     Allergic rhinitis      Cancer (H)     stomach cancer     Depression      Lumbago      Migraine      Other chronic pain     low back     Sacroiliitis (H)     low back pain     Trochanteric bursitis     leg length discrrepancy, hip pain       Past Surgical History:  Past Surgical History:   Procedure Laterality Date     ARTHROPLASTY HIP Left 2016 "     BACK SURGERY      L4-5 decompression     C REPAIR DETACH RETINA,SCLERAL BUCKLE       CATARACT IOL, RT/LT       CHOLECYSTECTOMY N/A 1/28/2020    Procedure: Cholecystectomy;  Surgeon: Yandel Mallory MD;  Location: [facility] OR     ENDOSCOPIC RETROGRADE CHOLANGIOPANCREATOGRAM N/A 7/27/2020    Procedure: ENDOSCOPIC RETROGRADE CHOLANGIOPANCREATOGRAPHY  **Latex Allergy** with jejunal biopsies;  Surgeon: Jeet Aviles MD;  Location:  OR     ESOPHAGOSCOPY, GASTROSCOPY, DUODENOSCOPY (EGD), COMBINED N/A 3/3/2020    Procedure: ESOPHAGOGASTRODUODENOSCOPY, WITH ENDOSCOPIC US (enoxaparin);  Surgeon: Bakari Plata MD;  Location: [facility]     EYE SURGERY       IR CHEST PORT PLACEMENT > 5 YRS OF AGE  3/26/2020     OPEN REDUCTION INTERNAL FIXATION RODDING INTRAMEDULLARY FEMUR Left 12/23/2014    Procedure: OPEN REDUCTION INTERNAL FIXATION RODDING INTRAMEDULLARY FEMUR;  Surgeon: Arnol Santiago MD;  Location: [facility] OR     ORTHOPEDIC SURGERY       PICC DOUBLE LUMEN PLACEMENT Right 02/07/2020    5 fr double lumen 41 cm     REMOVE HARDWARE LOWER EXTREMITY Left 4/7/2015    Procedure: REMOVE HARDWARE LOWER EXTREMITY;  Surgeon: Arnol Santiago MD;  Location: [facility] OR     REMOVE HARDWARE RODDING INTRAMEDULLARY FEMUR Left 12/17/2015    Procedure: REMOVE HARDWARE RODDING INTRAMEDULLARY FEMUR;  Surgeon: Arnol Santiago MD;  Location: [facility]     RESECT BONE LOWER EXTREMITY Left 12/23/2014    Procedure: RESECT BONE LOWER EXTREMITY;  Surgeon: Arnol Santiago MD;  Location: [facility] OR     WHIPPLE PROCEDURE N/A 1/28/2020    Procedure: Open Whipple procedure and Cholecystectomy;  Surgeon: Yandel Mallory MD;  Location:  OR       Social History:  Social History     Socioeconomic History     Marital status:      Spouse name: Not on file     Number of children: Not on file     Years of education: Not on file     Highest education level: Not on file   Occupational History     Not on file   Social Needs     Financial resource strain:  Not on file     Food insecurity     Worry: Not on file     Inability: Not on file     Transportation needs     Medical: Not on file     Non-medical: Not on file   Tobacco Use     Smoking status: Former Smoker     Packs/day: 0.50     Years: 25.00     Pack years: 12.50     Types: Cigarettes     Last attempt to quit: 2020     Years since quittin.6     Smokeless tobacco: Never Used   Substance and Sexual Activity     Alcohol use: No     Drug use: No     Sexual activity: Not on file   Lifestyle     Physical activity     Days per week: Not on file     Minutes per session: Not on file     Stress: Not on file   Relationships     Social connections     Talks on phone: Not on file     Gets together: Not on file     Attends [employer] service: Not on file     Active member of club or organization: Not on file     Attends meetings of clubs or organizations: Not on file     Relationship status: Not on file     Intimate partner violence     Fear of current or ex partner: Not on file     Emotionally abused: Not on file     Physically abused: Not on file     Forced sexual activity: Not on file   Other Topics Concern     Parent/sibling w/ CABG, MI or angioplasty before 65F 55M? Not Asked   Social History Narrative     Not on file        Family History  Family History   Problem Relation Age of Onset     Heart Failure Mother 77     Anesthesia Reaction No family hx of      Deep Vein Thrombosis No family hx of        Outpatient Medications:  No current facility-administered medications on file prior to encounter.   amitriptyline (ELAVIL) 25 MG tablet, Take 3 tablets by mouth At Bedtime  citalopram (CELEXA) 20 MG tablet, Take 20 mg by mouth every morning   famotidine (PEPCID) 20 MG tablet, Take 1 tablet (20 mg) by mouth 2 times daily  loperamide (IMODIUM) 2 MG capsule, Take 4 mg by mouth 4 times daily as needed for diarrhea   LORazepam (ATIVAN) 0.5 MG tablet, Take 1 tablet (0.5 mg) by mouth every 4 hours as needed (Anxiety,  Nausea/Vomiting or Sleep)  meclizine (ANTIVERT) 25 MG tablet, Take 1 tablet (25 mg) by mouth 3 times daily as needed for dizziness  ondansetron (ZOFRAN) 8 MG tablet, Take 1 tablet (8 mg) by mouth every 8 hours as needed (Nausea/Vomiting)  oxyCODONE (ROXICODONE) 5 MG tablet, Take 1-2 tablets (5-10 mg) by mouth every 6 hours as needed for severe pain  pantoprazole (PROTONIX) 40 MG EC tablet, TAKE ONE TABLET BY MOUTH TWICE A DAY  potassium chloride ER (KLOR-CON M) 20 MEQ CR tablet, Take 1 tablet (20 mEq) by mouth daily  dexamethasone (DECADRON) 4 MG tablet, Take 1 tablet (4mg) the day before, day of, and 2 days after chemotherapy  diclofenac (FLECTOR) 1.3 % patch, Place 1 patch onto the skin daily as needed   furosemide (LASIX) 20 MG tablet, Take 0.5 tablets (10 mg) by mouth daily for 5 days  naloxone (NARCAN) 4 MG/0.1ML nasal spray, Spray 1 spray (4 mg) into one nostril alternating nostrils once as needed for opioid reversal every 2-3 minutes until assistance arrives  simethicone 80 MG TABS, Take 1 tablet by mouth every 6 hours as needed (bloating, gas, belching)  traMADol (ULTRAM) 50 MG tablet, Take 50 mg by mouth every 6 hours as needed for severe pain           Labs & Studies: I personally reviewed the following studies:  ROUTINE LABS (Last four results):  CMP  Recent Labs   Lab 08/21/20  1755      POTASSIUM 3.5   CHLORIDE 109   CO2 24   ANIONGAP 6   *   BUN 6*   CR 0.82   GFRESTIMATED 81   GFRESTBLACK >90   PETRA 8.4*   PROTTOTAL 5.8*   ALBUMIN 2.3*   BILITOTAL 0.5   ALKPHOS 221*   AST 29   ALT 23     CBC  Recent Labs   Lab 08/21/20  1755   WBC 4.9   RBC 2.96*   HGB 8.3*   HCT 27.5*   MCV 93   MCH 28.0   MCHC 30.2*   RDW 22.2*   *     INRNo lab results found in last 7 days.    IMAGING:  CT-CAP, 8/21/2020  IMPRESSION:   1. There is mild wall thickening of the cecum and rectum, concerning  for colitis.   2. Thickening of the bladder wall with urothelial enhancement  concerning for cystitis.  2.  Postsurgical changes of Whipple procedure.   3. 1.8 cm soft tissue nodule in the right upper quadrant of the  abdomen, abutting the abdominal wall, concerning for metastasis. This  is unchanged in size compared to 7/28/2020.  4. Unchanged mild enhancement of the common bile duct, which may  represent cholangitis. Small central pneumobilia and gas in the main  pancreatic duct.  5. Small volume ascites in the abdomen and pelvis, slightly increased  compared to the previous exam.

## 2020-08-22 NOTE — PLAN OF CARE
"  SHIFT: [id number]    Situation:  54 year old female with gastric adenocarcinoma, stage llB p T3aN0 s/p whipple on neoadjuvant chemo ( Taxotere, 5-Follow-up, oxaliplatin) last  administered 1 week prior to admission.  Patient admitted with colitis infection vs chemotherapy associated.    VITALS: BP 99/57 (BP Location: Right arm)   Pulse 105   Temp 98.9  F (37.2  C) (Oral)   Resp 18   Ht 1.753 m (5' 9\")   Wt 75.9 kg (167 lb 6.4 oz)   LMP 09/23/2014   SpO2 93%   BMI 24.72 kg/m      PAIN: Patient endorses uncontrolled abdominal pain which necessitated 2 doses PRN IV MS 2mg and 1 PRN dose 5 mg Oxycodone and 1 dose PRN 10 mg Oxycodone. Patient declined use of aqua K pad    CARDIAC: Tachycardic.    NEURO: Alert, oriented x4. Denies numbness or tingling, Patient reports fall this past week when she was dizzy.    RESPIRATORY: NL breathing on RA    GI/: Endorses abdominal pain.     DIET: NPO after midnight    ACTIVITY: Independent with SEC    LINES/DRAINS: R chest wall port    SKIN: pale, large bruise on left upper thigh from fall as well as bruising around right eye.      EVENTS OF SHIFT: Patietnt endorses using ativan at home for anxiety and nausea. Note Palliative care outreach on 8-18 and their recommendations with tapering pain medications prior    To surgery.    PLAN:  Anticipate  possible GI consult in AM, Continue to monitor pain control, labs,  Patient will need pain plan  In place   "

## 2020-08-23 ENCOUNTER — PATIENT OUTREACH (OUTPATIENT)
Dept: CARE COORDINATION | Facility: CLINIC | Age: 55
End: 2020-08-23

## 2020-08-23 VITALS
BODY MASS INDEX: 24.79 KG/M2 | HEIGHT: 69 IN | HEART RATE: 105 BPM | OXYGEN SATURATION: 96 % | RESPIRATION RATE: 16 BRPM | WEIGHT: 167.4 LBS | SYSTOLIC BLOOD PRESSURE: 96 MMHG | DIASTOLIC BLOOD PRESSURE: 55 MMHG | TEMPERATURE: 99.7 F

## 2020-08-23 PROBLEM — R10.9 ABDOMINAL PAIN: Status: ACTIVE | Noted: 2020-08-23

## 2020-08-23 LAB
ALBUMIN SERPL-MCNC: 1.8 G/DL (ref 3.4–5)
ALP SERPL-CCNC: 193 U/L (ref 40–150)
ALT SERPL W P-5'-P-CCNC: 17 U/L (ref 0–50)
ANION GAP SERPL CALCULATED.3IONS-SCNC: 5 MMOL/L (ref 3–14)
AST SERPL W P-5'-P-CCNC: 25 U/L (ref 0–45)
BILIRUB SERPL-MCNC: 0.4 MG/DL (ref 0.2–1.3)
BUN SERPL-MCNC: 6 MG/DL (ref 7–30)
C COLI+JEJUNI+LARI FUSA STL QL NAA+PROBE: NOT DETECTED
C DIFF TOX B STL QL: NEGATIVE
CALCIUM SERPL-MCNC: 7.6 MG/DL (ref 8.5–10.1)
CHLORIDE SERPL-SCNC: 107 MMOL/L (ref 94–109)
CO2 SERPL-SCNC: 27 MMOL/L (ref 20–32)
CREAT SERPL-MCNC: 0.82 MG/DL (ref 0.52–1.04)
EC STX1 GENE STL QL NAA+PROBE: NOT DETECTED
EC STX2 GENE STL QL NAA+PROBE: NOT DETECTED
ENTERIC PATHOGEN COMMENT: NORMAL
ERYTHROCYTE [DISTWIDTH] IN BLOOD BY AUTOMATED COUNT: 22.3 % (ref 10–15)
GFR SERPL CREATININE-BSD FRML MDRD: 82 ML/MIN/{1.73_M2}
GLUCOSE SERPL-MCNC: 99 MG/DL (ref 70–99)
HCT VFR BLD AUTO: 23.9 % (ref 35–47)
HGB BLD-MCNC: 7.2 G/DL (ref 11.7–15.7)
MCH RBC QN AUTO: 27.5 PG (ref 26.5–33)
MCHC RBC AUTO-ENTMCNC: 30.1 G/DL (ref 31.5–36.5)
MCV RBC AUTO: 91 FL (ref 78–100)
NOROV GI+II ORF1-ORF2 JNC STL QL NAA+PR: NOT DETECTED
PLATELET # BLD AUTO: 152 10E9/L (ref 150–450)
POTASSIUM SERPL-SCNC: 3.2 MMOL/L (ref 3.4–5.3)
PROT SERPL-MCNC: 4.8 G/DL (ref 6.8–8.8)
RBC # BLD AUTO: 2.62 10E12/L (ref 3.8–5.2)
RVA NSP5 STL QL NAA+PROBE: NOT DETECTED
SALMONELLA SP RPOD STL QL NAA+PROBE: NOT DETECTED
SHIGELLA SP+EIEC IPAH STL QL NAA+PROBE: NOT DETECTED
SODIUM SERPL-SCNC: 139 MMOL/L (ref 133–144)
SPECIMEN SOURCE: NORMAL
V CHOL+PARA RFBL+TRKH+TNAA STL QL NAA+PR: NOT DETECTED
WBC # BLD AUTO: 11.6 10E9/L (ref 4–11)
Y ENTERO RECN STL QL NAA+PROBE: NOT DETECTED

## 2020-08-23 PROCEDURE — 96376 TX/PRO/DX INJ SAME DRUG ADON: CPT

## 2020-08-23 PROCEDURE — 85027 COMPLETE CBC AUTOMATED: CPT | Performed by: INTERNAL MEDICINE

## 2020-08-23 PROCEDURE — G0378 HOSPITAL OBSERVATION PER HR: HCPCS

## 2020-08-23 PROCEDURE — 80053 COMPREHEN METABOLIC PANEL: CPT | Performed by: INTERNAL MEDICINE

## 2020-08-23 PROCEDURE — 87506 IADNA-DNA/RNA PROBE TQ 6-11: CPT | Performed by: INTERNAL MEDICINE

## 2020-08-23 PROCEDURE — 25000128 H RX IP 250 OP 636: Performed by: INTERNAL MEDICINE

## 2020-08-23 PROCEDURE — 25000132 ZZH RX MED GY IP 250 OP 250 PS 637: Mod: GY | Performed by: STUDENT IN AN ORGANIZED HEALTH CARE EDUCATION/TRAINING PROGRAM

## 2020-08-23 PROCEDURE — 36592 COLLECT BLOOD FROM PICC: CPT | Performed by: INTERNAL MEDICINE

## 2020-08-23 PROCEDURE — 25000128 H RX IP 250 OP 636: Performed by: STUDENT IN AN ORGANIZED HEALTH CARE EDUCATION/TRAINING PROGRAM

## 2020-08-23 PROCEDURE — 87493 C DIFF AMPLIFIED PROBE: CPT | Mod: XU | Performed by: INTERNAL MEDICINE

## 2020-08-23 RX ORDER — LIDOCAINE 40 MG/G
CREAM TOPICAL
Status: DISCONTINUED | OUTPATIENT
Start: 2020-08-23 | End: 2020-08-23 | Stop reason: HOSPADM

## 2020-08-23 RX ORDER — HEPARIN SODIUM,PORCINE 10 UNIT/ML
5-10 VIAL (ML) INTRAVENOUS
Status: DISCONTINUED | OUTPATIENT
Start: 2020-08-23 | End: 2020-08-23 | Stop reason: HOSPADM

## 2020-08-23 RX ORDER — HEPARIN SODIUM (PORCINE) LOCK FLUSH IV SOLN 100 UNIT/ML 100 UNIT/ML
5 SOLUTION INTRAVENOUS
Status: DISCONTINUED | OUTPATIENT
Start: 2020-08-23 | End: 2020-08-23 | Stop reason: HOSPADM

## 2020-08-23 RX ORDER — HEPARIN SODIUM,PORCINE 10 UNIT/ML
5-10 VIAL (ML) INTRAVENOUS EVERY 24 HOURS
Status: DISCONTINUED | OUTPATIENT
Start: 2020-08-23 | End: 2020-08-23 | Stop reason: HOSPADM

## 2020-08-23 RX ADMIN — PANTOPRAZOLE SODIUM 40 MG: 40 TABLET, DELAYED RELEASE ORAL at 08:20

## 2020-08-23 RX ADMIN — SIMETHICONE 80 MG: 80 TABLET, CHEWABLE ORAL at 08:27

## 2020-08-23 RX ADMIN — OXYCODONE HYDROCHLORIDE 10 MG: 5 TABLET ORAL at 10:36

## 2020-08-23 RX ADMIN — SODIUM CHLORIDE, PRESERVATIVE FREE 5 ML: 5 INJECTION INTRAVENOUS at 13:31

## 2020-08-23 RX ADMIN — PIPERACILLIN AND TAZOBACTAM 4.5 G: 4; .5 INJECTION, POWDER, FOR SOLUTION INTRAVENOUS at 03:31

## 2020-08-23 RX ADMIN — OXYCODONE HYDROCHLORIDE 10 MG: 5 TABLET ORAL at 03:53

## 2020-08-23 RX ADMIN — FAMOTIDINE 20 MG: 20 TABLET ORAL at 08:20

## 2020-08-23 RX ADMIN — OXYCODONE HYDROCHLORIDE 10 MG: 5 TABLET ORAL at 07:17

## 2020-08-23 RX ADMIN — ONDANSETRON 4 MG: 2 INJECTION INTRAMUSCULAR; INTRAVENOUS at 08:26

## 2020-08-23 RX ADMIN — PIPERACILLIN AND TAZOBACTAM 4.5 G: 4; .5 INJECTION, POWDER, FOR SOLUTION INTRAVENOUS at 11:28

## 2020-08-23 RX ADMIN — CITALOPRAM HYDROBROMIDE 20 MG: 20 TABLET, FILM COATED ORAL at 08:20

## 2020-08-23 ASSESSMENT — ACTIVITIES OF DAILY LIVING (ADL)
ADLS_ACUITY_SCORE: 13

## 2020-08-23 NOTE — DISCHARGE SUMMARY
[facility], [place]  Hospitalist Discharge Summary      Date of Admission:  8/21/2020  Date of Discharge:  8/23/2020  Discharging Provider: Mika Lamar MD  Discharge Team: Hospitalist Service, [place]     Discharge Diagnoses   # Abd pain  # Colitis  # Gastric Adenocarcinoma, stage IIB s/p Whipple, neoadjuvant chemo    Follow-ups Needed After Discharge   Follow-up Appointments     Adult [facility] Follow-up and recommended labs and tests      Follow up with primary care provider, Marianne Gonzalez, or oncology within   7-21 days for hospital follow- up.  No follow up labs or test are needed.         Appointments on [place] and/or [facility] (with [facility] or [facility]   provider or service). Call [phone] if you haven't heard regarding   these appointments within 7 days of discharge.             Unresulted Labs Ordered in the Past 30 Days of this Admission     Date and Time Order Name Status Description    8/21/2020 1733 Blood culture Preliminary     8/21/2020 1733 Blood culture Preliminary     7/21/2020 1240 Blood Morphology Pathologist Review In process           Discharge Disposition   Discharged to home  Condition at discharge: Stable    Hospital Course   Nathalie Bashir is a 54 year old woman with gastric adenocarcinoma, stage IIB pT4aN0 s/p Whipple on neoadjuvant chemo (Taxotere, 5-FU, oxaliplatin) last administered 1 week prior to admission     # Abd pain  # Colitis   # Elevated lactate  Presenting with tachycardic, elevated lactate likely secondary to colitis shown on CT or diarrhea.  She had a similar pain presentation in July and ERCP was pursued which did not show significant biliary obstruction and her mild alk phos elevation did not improve despite empiric dilation of her HJ anastomosis.   Her diarrhea and colitis on CT is likely chemo related as her enteric panel was negative. Possibly infectious, particularly with recent cholangitis requiring ERCP and dilation of duct. Her  CT shows pneumobilia suggesting patency of her HJ.  She was treated with pip/tazo for possible cholangitis but this is unlikely and thus was stopped.  NGTD from blood cultures. Her pain improved without further intervention.  Her meds were not changed on discharge.    Consultations This Hospital Stay   ONCOLOGY ADULT IP CONSULT  GI PANCREATICOBILIARY ADULT IP CONSULT    Code Status   Full Code    Time Spent on this Encounter   I, Mika Lamar MD, personally saw the patient today and spent greater than 30 minutes discharging this patient.       Mika Lamar MD  [facility], [place]  ______________________________________________________________________    Physical Exam   Vital Signs: Temp: (P) 99.7  F (37.6  C) Temp src: (P) Oral BP: (P) 97/49 Pulse: (P) 105   Resp: (P) 16 SpO2: (P) 98 % O2 Device: (P) None (Room air)    Weight: 167 lbs 6.4 oz  Appearance: Alert and appropriate sitting up in bed  Eyes: right periorbital ecchymosis without edema. PERRL, EOM grossly intact, conjunctivae and sclerae clear.   Nose: Nares clear with no active discharge.    Mouth/Throat: Moist mucous membranes. No oral lesions, pharynx clear with no erythema or exudate.  Neck: port in right upper chest c/d/i  Pulmonary: Normal work of breathing on room air. Good air entry, clear to auscultation bilaterally, with no rales, rhonchi, or wheezing.  Cardiovascular: tachycardic, normal S1 and S2, with no murmurs.  Normal symmetric peripheral pulses and brisk cap refill.  Abdominal: Normoactive bowel sounds, soft, NT/ND  Neurologic: Alert, speech fluid with normal articulation, face symmetric, moving all extremities equally with grossly normal coordination.  Ext: no pedal edema  Skin: No significant rashes, ecchymoses, or lacerations.       Primary Care Physician   Marianne Gonzalez    Discharge Orders      Reason for your hospital stay    Abdominal pain  Colitis noted by CT     Adult [facility] Follow-up and  recommended labs and tests    Follow up with primary care provider, Marianne Gonzalez, or oncology within 7-21 days for hospital follow- up.  No follow up labs or test are needed.       Appointments on [place] and/or [facility] (with [facility] or [facility] provider or service). Call [phone] if you haven't heard regarding these appointments within 7 days of discharge.     Activity    Your activity upon discharge: activity as tolerated     Full Code     Diet    Follow this diet upon discharge: Orders Placed This Encounter      Regular Diet Adult       Significant Results and Procedures   Most Recent 3 CBC's:  Recent Labs   Lab Test 08/23/20  0659 08/21/20  1755 08/14/20  0637   WBC 11.6* 4.9 6.5   HGB 7.2* 8.3* 8.5*   MCV 91 93 90    139* 222     Most Recent 3 BMP's:  Recent Labs   Lab Test 08/23/20  0659 08/21/20  1755 08/14/20  0637    139 140   POTASSIUM 3.2* 3.5 4.0   CHLORIDE 107 109 107   CO2 27 24 30   BUN 6* 6* 7   CR 0.82 0.82 0.82   ANIONGAP 5 6 4   PETRA 7.6* 8.4* 8.0*   GLC 99 119* 114*     Most Recent 2 LFT's:  Recent Labs   Lab Test 08/23/20  0659 08/21/20  1755   AST 25 29   ALT 17 23   ALKPHOS 193* 221*   BILITOTAL 0.4 0.5     Most Recent 3 INR's:  Recent Labs   Lab Test 07/27/20  1015 07/20/20  1509 03/26/20  0705   INR 1.32* 1.20* 1.04   ,   Results for orders placed or performed during the hospital encounter of 08/21/20   CT Abdomen Pelvis w Contrast    Narrative    EXAMINATION: CT ABDOMEN PELVIS W CONTRAST, 8/21/2020 7:27 PM    TECHNIQUE:  Helical CT images from the lung bases through the  symphysis pubis were obtained with IV contrast. Contrast dose:  iopamidol (ISOVUE-370) solution 112 mL    COMPARISON: CT abdomen pelvis 7/28/2020    HISTORY: abdominal pain, hx cancer    Additional history obtained from EMR: 54-year-old female with a  history of poorly differentiated gastric adenocarcinoma on  chemotherapy (Taxotere, 5-Follow-up, oxaliplatinum) pancreatic cyst  status post Whipple's  procedure (1/28/2020).     FINDINGS:    Abdomen and pelvis: Postoperative changes of Whipple. Small amount of  central pneumobilia and small amount of gas pancreatic duct in body.  No focal liver lesions. There is mild enhancement of the wall of the  common bile duct (5, image 136), similar to the previous exam. The  residual pancreas is unremarkable. Spleen and adrenal glands are  unremarkable. Both kidneys are unremarkable. Hydronephrosis or  hydroureter. No renal or ureteral stones. Streak artifact from the  left hip arthroplasty slightly limits evaluation of the pelvis. There  is diffuse wall thickening of the urinary bladder, with enhancement.  Colonic diverticulosis, without convincing evidence of acute  diverticulitis. There is wall thickening of the cecum and the rectum.  Appendix is unremarkable. Small amount of free fluid in the abdomen  and pelvis, slightly increased compared to the previous exam on  7/28/2020. No free intraperitoneal air.    There is a 1.8 cm soft tissue nodule in the right upper quadrant of  the abdomen, abutting the abdominal wall (series 2, image 39), which  is unchanged in size compared to 7/28/2020. Subcentimeter  retroperitoneal lymph nodes are not significantly changed in size  compared to the previous exam. The abdominal aorta is normal in  caliber. The major intra-abdominal vasculature.    Lung bases: Heart is normal in size. No pericardial effusion.  Subsegmental atelectasis in the posterior right lower lobe.    Bones and soft tissues: Diffuse soft tissue anasarca. Postsurgical  changes of left total hip arthroplasty. Left sacroiliac fusion device  with bony fusion in the central portion of the sacroiliac joint. There  are no acute or suspicious osseous lesions. Degenerative changes of  the spine.      Impression    IMPRESSION:   1. There is mild wall thickening of the cecum and rectum, concerning  for colitis.   2. Thickening of the bladder wall with urothelial  enhancement  concerning for cystitis.  2. Postsurgical changes of Whipple procedure.   3. 1.8 cm soft tissue nodule in the right upper quadrant of the  abdomen, abutting the abdominal wall, concerning for metastasis. This  is unchanged in size compared to 7/28/2020.  4. Unchanged mild enhancement of the common bile duct, which may  represent cholangitis. Small central pneumobilia and gas in the main  pancreatic duct.  5. Small volume ascites in the abdomen and pelvis, slightly increased  compared to the previous exam.    I have personally reviewed the examination and initial interpretation  and I agree with the findings.    YORDY DE LEON MD       Discharge Medications   Current Discharge Medication List      CONTINUE these medications which have NOT CHANGED    Details   amitriptyline (ELAVIL) 25 MG tablet Take 3 tablets by mouth At Bedtime      citalopram (CELEXA) 20 MG tablet Take 20 mg by mouth every morning       famotidine (PEPCID) 20 MG tablet Take 1 tablet (20 mg) by mouth 2 times daily  Qty: 60 tablet, Refills: 3    Associated Diagnoses: Other gastritis without hemorrhage, unspecified chronicity      loperamide (IMODIUM) 2 MG capsule Take 4 mg by mouth 4 times daily as needed for diarrhea       LORazepam (ATIVAN) 0.5 MG tablet Take 1 tablet (0.5 mg) by mouth every 4 hours as needed (Anxiety, Nausea/Vomiting or Sleep)  Qty: 30 tablet, Refills: 5    Associated Diagnoses: Gastric adenocarcinoma (H)      meclizine (ANTIVERT) 25 MG tablet Take 1 tablet (25 mg) by mouth 3 times daily as needed for dizziness  Qty: 60 tablet, Refills: 0    Associated Diagnoses: Gastric adenocarcinoma (H)      ondansetron (ZOFRAN) 8 MG tablet Take 1 tablet (8 mg) by mouth every 8 hours as needed (Nausea/Vomiting)  Qty: 90 tablet, Refills: 3    Associated Diagnoses: Gastric adenocarcinoma (H)      oxyCODONE (ROXICODONE) 5 MG tablet Take 1-2 tablets (5-10 mg) by mouth every 6 hours as needed for severe pain  Qty: 60 tablet,  Refills: 0    Associated Diagnoses: Gastric adenocarcinoma (H)      pantoprazole (PROTONIX) 40 MG EC tablet TAKE ONE TABLET BY MOUTH TWICE A DAY  Qty: 60 tablet, Refills: 1    Associated Diagnoses: IPMN (intraductal papillary mucinous neoplasm); Malignant neoplasm of pyloric antrum (H); Abdominal pain, epigastric      potassium chloride ER (KLOR-CON M) 20 MEQ CR tablet Take 1 tablet (20 mEq) by mouth daily  Qty: 30 tablet, Refills: 1    Associated Diagnoses: Hypokalemia      dexamethasone (DECADRON) 4 MG tablet Take 1 tablet (4mg) the day before, day of, and 2 days after chemotherapy  Qty: 12 tablet, Refills: 3    Associated Diagnoses: Gastric adenocarcinoma (H)      diclofenac (FLECTOR) 1.3 % patch Place 1 patch onto the skin daily as needed       naloxone (NARCAN) 4 MG/0.1ML nasal spray Spray 1 spray (4 mg) into one nostril alternating nostrils once as needed for opioid reversal every 2-3 minutes until assistance arrives  Qty: 0.2 mL, Refills: 0    Associated Diagnoses: Gastric adenocarcinoma (H)      simethicone 80 MG TABS Take 1 tablet by mouth every 6 hours as needed (bloating, gas, belching)  Qty: 90 tablet, Refills: 3    Associated Diagnoses: Other acute gastritis without hemorrhage; Bloating      traMADol (ULTRAM) 50 MG tablet Take 50 mg by mouth every 6 hours as needed for severe pain         STOP taking these medications       furosemide (LASIX) 20 MG tablet Comments:   Reason for Stopping:             Allergies   Allergies   Allergen Reactions     Gluten Meal Palpitations     Augmentin Rash     Patient tolerated Zosyn in 7/2020     Oxycontin [Oxycodone]      Confusion, loopy, oxycodone is tolerated     Pregabalin      interfered with Cymbalta     Topiramate      Tongue numbness, taste alteration, irritable      Acetaminophen Nausea     Urine retention       Dust Mite Extract      Gabapentin Dizziness and GI Disturbance     Latex Rash     Methadone Fatigue     Mold      Naprosyn [Naproxen] Dizziness and GI  Disturbance     Oxymetholone      Seasonal Allergies

## 2020-08-23 NOTE — PLAN OF CARE
"SHIFT: 4270-7220  Situation:  54 year old female with gastric adenocarcinoma, stage llB p T3aN0 s/p whipple on neoadjuvant chemo ( Taxotere, 5-Follow-up, oxaliplatin) last  administered 1 week prior to admission.  Patient admitted with colitis infection vs chemotherapy associated.  VITALS: BP 96/55 (BP Location: Right arm)   Pulse 105   Temp 99.7  F (37.6  C) (Oral)   Resp 16   Ht 1.753 m (5' 9\")   Wt 75.9 kg (167 lb 6.4 oz)   LMP 09/23/2014   SpO2 96%   BMI 24.72 kg/m     PAIN: Patient endorses controlled abdominal pain  requesting PRN dosing of 10 mg Oxycodone  x3 during shift. Patient calls for pain medication as soon as it is available on PRN basis.  CARDIAC: Tachycardic.  NEURO: Alert, oriented x4. Denies numbness or tingling, Patient reports fall this past week when she was dizzy.  RESPIRATORY: NL breathing on RA  GI/: Endorses abdominal pain. And  gas pains treated with simethicone. Denied nausea  DIET: Regular, patient reports she tries to consume meals that are in simple CHOs to digest easier since her digestion is slowed down  ACTIVITY: Independent with SEC  LINES/DRAINS: R chest wall port  patent and TKO when not infusing scheduled Zosyn  SKIN: pale, large bruise on left upper thigh from fall as well as bruising around right eye.    EVENTS OF SHIFT: Patient had bm and stool sample sent down to lab.  Patient considering possible discharge if medically stable. Spouse available for support at home  PLAN:  Anticipate  potential discharge when medically stable, Continue to monitor pain control, labs,  Patient will need pain plan  in place   "

## 2020-08-24 LAB — INTERPRETATION ECG - MUSE: NORMAL

## 2020-08-24 NOTE — PROGRESS NOTES
[facility]: Post-Discharge Note  SITUATION                                                      Admission:    Admission Date: 08/21/20   Reason for Admission: Abdominal pain  Discharge:   Discharge Date: 08/23/20  Discharge Diagnosis: Abdominal pain  Discharge Service: General Medicine  Discharge Plan: fu oncology    BACKGROUND                                                      Nathalie Bashir is a 54 year old woman with gastric adenocarcinoma, stage IIB pT4aN0 s/p Whipple on neoadjuvant chemo (Taxotere, 5-FU, oxaliplatin) last administered 1 week prior to admission       ASSESSMENT      Discharge Assessment  Patient reports symptoms are: Improved(still some pain but better)  Does the patient have all of their medications?: Yes  Does patient know what their new medications are for?: Yes  Does patient have a follow-up appointment scheduled?: No  Does patient have any other questions or concerns?: No    Post-op  Did the patient have surgery or a procedure: No  Fever: No  Chills: No  Eating & Drinking: eating and drinking without complaints/concerns  PO Intake: regular diet  Bowel Function: loose stools  Urinary Status: voiding without complaint/concerns        PLAN                                                      Outpatient Plan:      Future Appointments   Date Time Provider Department Center   9/2/2020  9:00 AM [address]   9/4/2020  8:00 AM Kenzie Rizo APRN [facility]   9/4/2020  9:30 AM Yandel Mallory MD [facility]   9/18/2020 11:00 AM [facility]   10/8/2020 12:30 PM Magaly Pollard MD [facility]   10/9/2020  9:00 AM Yandel Mallory MD [facility]           Tessie Swan

## 2020-08-27 LAB
BACTERIA SPEC CULT: NO GROWTH
BACTERIA SPEC CULT: NO GROWTH
SPECIMEN SOURCE: NORMAL
SPECIMEN SOURCE: NORMAL

## 2020-09-01 ASSESSMENT — ENCOUNTER SYMPTOMS
CONSTIPATION: 0
VOMITING: 0
DIARRHEA: 1
BLOOD IN STOOL: 0
HEARTBURN: 0
RECTAL PAIN: 0
NAUSEA: 1
BLOATING: 0
ABDOMINAL PAIN: 1
JAUNDICE: 0
BOWEL INCONTINENCE: 0

## 2020-09-02 ENCOUNTER — TELEPHONE (OUTPATIENT)
Dept: ONCOLOGY | Facility: CLINIC | Age: 55
End: 2020-09-02

## 2020-09-02 ENCOUNTER — HOSPITAL ENCOUNTER (OUTPATIENT)
Dept: PET IMAGING | Facility: CLINIC | Age: 55
Discharge: HOME | End: 2020-09-02
Attending: SURGERY | Admitting: SURGERY
Payer: MEDICARE

## 2020-09-02 DIAGNOSIS — C16.2 MALIGNANT NEOPLASM OF BODY OF STOMACH (H): ICD-10-CM

## 2020-09-02 DIAGNOSIS — C16.9 GASTRIC ADENOCARCINOMA (H): ICD-10-CM

## 2020-09-02 DIAGNOSIS — R60.9 EDEMA: Primary | ICD-10-CM

## 2020-09-02 PROCEDURE — A9552 F18 FDG: HCPCS | Performed by: SURGERY

## 2020-09-02 PROCEDURE — 25000128 H RX IP 250 OP 636: Performed by: SURGERY

## 2020-09-02 PROCEDURE — 74177 CT ABD & PELVIS W/CONTRAST: CPT

## 2020-09-02 PROCEDURE — 34300033 ZZH RX 343: Performed by: SURGERY

## 2020-09-02 RX ORDER — OXYCODONE HYDROCHLORIDE 5 MG/1
5-10 TABLET ORAL 2 TIMES DAILY PRN
Qty: 60 TABLET | Refills: 0 | Status: SHIPPED | OUTPATIENT
Start: 2020-09-04 | End: 2020-09-21

## 2020-09-02 RX ORDER — IOPAMIDOL 755 MG/ML
10-135 INJECTION, SOLUTION INTRAVASCULAR ONCE
Status: COMPLETED | OUTPATIENT
Start: 2020-09-02 | End: 2020-09-02

## 2020-09-02 RX ORDER — HEPARIN SODIUM (PORCINE) LOCK FLUSH IV SOLN 100 UNIT/ML 100 UNIT/ML
500 SOLUTION INTRAVENOUS ONCE
Status: COMPLETED | OUTPATIENT
Start: 2020-09-02 | End: 2020-09-02

## 2020-09-02 RX ADMIN — IOPAMIDOL 107 ML: 755 INJECTION, SOLUTION INTRAVENOUS at 10:47

## 2020-09-02 RX ADMIN — FLUDEOXYGLUCOSE F-18 10.19 MCI.: 500 INJECTION, SOLUTION INTRAVENOUS at 09:09

## 2020-09-02 RX ADMIN — Medication 500 UNITS: at 10:47

## 2020-09-02 NOTE — TELEPHONE ENCOUNTER
[facility] Telephone Triage Note    Assessment: Patient called in to triage reporting the following symptoms: patient noting that her right foot/ankle is swollen with pitting, no color changes or pain. Nathalie also reports that her feet are quite numb, have been for approximately 1.5 weeks, and she is having difficulty walking due to the numbness. Patient also asking for refill of oxycodone for  on Friday as she will be out on Saturday.    Recommendations: Discussed with Roxana Sweet PA-C.  Patient should have US of R lower extremity to r/o DVT and consult palliative on options for increased neuropathy as well as oxycodone refill.    Follow-Up: Order for above labs/procedures/infusion placed, Message sent to schedulers to add pt on to schedule, Instructed patient to seek care immediately for worsening symptoms, including: fever, chest pain, shortness of breath, dizziness. Patient voiced understanding of advice and/or instructions given.

## 2020-09-03 ENCOUNTER — TELEPHONE (OUTPATIENT)
Dept: PALLIATIVE CARE | Facility: CLINIC | Age: 55
End: 2020-09-03

## 2020-09-03 NOTE — TELEPHONE ENCOUNTER
Call to patient to inform her that Oxycodone had been refilled and that patient should limit its use to treat Neuropathy - patient verbalizes understanding states that she is having an ultrasound to assess swelling in extremities today

## 2020-09-04 ENCOUNTER — PRE VISIT (OUTPATIENT)
Dept: SURGERY | Facility: CLINIC | Age: 55
End: 2020-09-04

## 2020-09-04 ENCOUNTER — OFFICE VISIT (OUTPATIENT)
Dept: SURGERY | Facility: CLINIC | Age: 55
End: 2020-09-04
Attending: SURGERY
Payer: MEDICARE

## 2020-09-04 ENCOUNTER — ANCILLARY PROCEDURE (OUTPATIENT)
Dept: ULTRASOUND IMAGING | Facility: CLINIC | Age: 55
End: 2020-09-04
Attending: PHYSICIAN ASSISTANT
Payer: MEDICARE

## 2020-09-04 ENCOUNTER — PATIENT OUTREACH (OUTPATIENT)
Dept: GASTROENTEROLOGY | Facility: CLINIC | Age: 55
End: 2020-09-04

## 2020-09-04 VITALS
HEART RATE: 101 BPM | OXYGEN SATURATION: 100 % | HEIGHT: 69 IN | RESPIRATION RATE: 16 BRPM | WEIGHT: 167.7 LBS | SYSTOLIC BLOOD PRESSURE: 108 MMHG | DIASTOLIC BLOOD PRESSURE: 56 MMHG | BODY MASS INDEX: 24.84 KG/M2 | TEMPERATURE: 98 F

## 2020-09-04 DIAGNOSIS — C16.9 GASTRIC ADENOCARCINOMA (H): ICD-10-CM

## 2020-09-04 DIAGNOSIS — R60.9 EDEMA: ICD-10-CM

## 2020-09-04 DIAGNOSIS — C16.8 MALIGNANT NEOPLASM OF OVERLAPPING SITES OF STOMACH (H): Primary | ICD-10-CM

## 2020-09-04 DIAGNOSIS — D37.9 NEOPLASM OF UNCERTAIN BEHAVIOR OF DIGESTIVE ORGAN, UNSPECIFIED: ICD-10-CM

## 2020-09-04 DIAGNOSIS — D49.0 IPMN (INTRADUCTAL PAPILLARY MUCINOUS NEOPLASM): Primary | ICD-10-CM

## 2020-09-04 LAB
ALBUMIN SERPL-MCNC: 2.5 G/DL (ref 3.4–5)
ALP SERPL-CCNC: 200 U/L (ref 40–150)
ALT SERPL W P-5'-P-CCNC: 27 U/L (ref 0–50)
ANION GAP SERPL CALCULATED.3IONS-SCNC: 4 MMOL/L (ref 3–14)
AST SERPL W P-5'-P-CCNC: 55 U/L (ref 0–45)
BASOPHILS # BLD AUTO: 0.1 10E9/L (ref 0–0.2)
BASOPHILS NFR BLD AUTO: 0.7 %
BILIRUB SERPL-MCNC: 0.3 MG/DL (ref 0.2–1.3)
BUN SERPL-MCNC: 11 MG/DL (ref 7–30)
CALCIUM SERPL-MCNC: 8 MG/DL (ref 8.5–10.1)
CEA SERPL-MCNC: 4 UG/L (ref 0–2.5)
CHLORIDE SERPL-SCNC: 110 MMOL/L (ref 94–109)
CO2 SERPL-SCNC: 26 MMOL/L (ref 20–32)
CREAT SERPL-MCNC: 0.84 MG/DL (ref 0.52–1.04)
DIFFERENTIAL METHOD BLD: ABNORMAL
EOSINOPHIL # BLD AUTO: 0 10E9/L (ref 0–0.7)
EOSINOPHIL NFR BLD AUTO: 0.2 %
ERYTHROCYTE [DISTWIDTH] IN BLOOD BY AUTOMATED COUNT: 21.7 % (ref 10–15)
GFR SERPL CREATININE-BSD FRML MDRD: 79 ML/MIN/{1.73_M2}
GLUCOSE SERPL-MCNC: 99 MG/DL (ref 70–99)
HCT VFR BLD AUTO: 30.9 % (ref 35–47)
HGB BLD-MCNC: 9.4 G/DL (ref 11.7–15.7)
IMM GRANULOCYTES # BLD: 0 10E9/L (ref 0–0.4)
IMM GRANULOCYTES NFR BLD: 0.5 %
LYMPHOCYTES # BLD AUTO: 2 10E9/L (ref 0.8–5.3)
LYMPHOCYTES NFR BLD AUTO: 25.4 %
MCH RBC QN AUTO: 28 PG (ref 26.5–33)
MCHC RBC AUTO-ENTMCNC: 30.4 G/DL (ref 31.5–36.5)
MCV RBC AUTO: 92 FL (ref 78–100)
MONOCYTES # BLD AUTO: 0.7 10E9/L (ref 0–1.3)
MONOCYTES NFR BLD AUTO: 8.7 %
NEUTROPHILS # BLD AUTO: 5.2 10E9/L (ref 1.6–8.3)
NEUTROPHILS NFR BLD AUTO: 64.5 %
NRBC # BLD AUTO: 0 10*3/UL
NRBC BLD AUTO-RTO: 0 /100
PLATELET # BLD AUTO: 359 10E9/L (ref 150–450)
POTASSIUM SERPL-SCNC: 4.1 MMOL/L (ref 3.4–5.3)
PREALB SERPL IA-MCNC: 10 MG/DL (ref 15–45)
PROT SERPL-MCNC: 5.9 G/DL (ref 6.8–8.8)
RBC # BLD AUTO: 3.36 10E12/L (ref 3.8–5.2)
SODIUM SERPL-SCNC: 141 MMOL/L (ref 133–144)
WBC # BLD AUTO: 8 10E9/L (ref 4–11)

## 2020-09-04 PROCEDURE — G0463 HOSPITAL OUTPT CLINIC VISIT: HCPCS | Mod: ZF

## 2020-09-04 PROCEDURE — 82378 CARCINOEMBRYONIC ANTIGEN: CPT | Performed by: SURGERY

## 2020-09-04 ASSESSMENT — PAIN SCALES - GENERAL: PAINLEVEL: MODERATE PAIN (5)

## 2020-09-04 ASSESSMENT — MIFFLIN-ST. JEOR: SCORE: 1430.06

## 2020-09-04 NOTE — LETTER
9/4/2020     RE: Nathalie Bashir  [address] [facility] [address]    Dear Colleague,    Thank you for referring your patient, Nathalie Bashir, to the [facility]. Please see a copy of my visit note below.    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  The permanent Whipple specimen came back with an incidental, T4, diffuse type signet ring gastric cancer with a microscopically positive margin.     Pertinent Oncologic History: 54 F w/ history of atypical abdominal symptoms and GI complaints including nausea and intermittent diarrhea.  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  While on surveillance, her most recent EUS showed increase in the size of the cyst described as dilation of the main pancreatic duct in the head of the pancreas to 10.4 mm.  MRI 9 mm, and CT 8 mm.  No other suspicious features.  FNA and fluid analysis showed mucinous epithelium, a string sign, high fluid amylase, and normal fluid CEA.   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  The permanent Whipple specimen came back with an incidental, T4, diffuse type signet ring gastric cancer with a microscopically positive margin.  She recovered from her Whipple, then completed a course of neoadjuvant taxotere, 5-FU, and oxaliplatin, which she has had some difficulty with (including 2 hospitalizations).  She has been recently restaged on 8/21/2020 via CTAP and on 9/2/2020 via PET-CT without clear evidence of disease progression or obvious metastatic disease.  There is a right upper quadrant peritoneal nodule that has been stable imaging since late July and is not PET-avid.  Her most recent albumin 12 days ago was 1.8.  She now presents for  "discussion of management moving forward.     Pertinent Exam: Abdomen soft, non-tender, and non-distended.  No jaundice or scleral icterus.  Her midline incision is well healed.  She is in a wheel chair due to difficulty walking somewhat from strength, but mostly from neuropathy.  She has had falls because of neuropathy.    /56 (BP Location: Right arm, Patient Position: Sitting, Cuff Size: Adult Regular)   Pulse 101   Temp 98  F (36.7  C) (Oral)   Resp 16   Ht 1.753 m (5' 9\")   Wt 76.1 kg (167 lb 11.2 oz)   LMP 09/23/2014   SpO2 100%   BMI 24.76 kg/m       HISTORY OF PRESENT ILLNESS:  The patient is feeling better, but states the chemotherapy has wiped her out.  Weight is currently stable, but she struggles with taste and neuropathy still.  Her last dose of chemotherapy was approximately 2 weeks ago.  She is in a wheelchair as it is difficult to walk any distance with the neuropathy and she has had falls as a result.  She still feels weak.       Pertinent Work-Up/Findings:     Labs (9/4/2020): CBC w/ anemia and WBC of 8.0.  CMP w/ albumin 2.5, normal bilirubin/AST/ALT, and alk phos 200.  CEA 4.0.  CA 19-9 Pending.    (8/23/2020): CBC w/ anemia and WBC of 11.6.  CMP w/ albumin of 1.8, normal bilirubin/AST/ALT, and alk phos 193.  C. Diff and enteric panel negative.    CTAP (8/21/2020): IMPRESSION:   1. There is mild wall thickening of the cecum and rectum, concerning for colitis.   2. Thickening of the bladder wall with urothelial enhancement concerning for cystitis.  3. Postsurgical changes of Whipple procedure.   4. 1.8 cm soft tissue nodule in the right upper quadrant of the abdomen, abutting the abdominal wall, concerning for metastasis. This is unchanged in size compared to 7/28/2020.  5. Unchanged mild enhancement of the common bile duct, which may represent cholangitis. Small central pneumobilia and gas in the main pancreatic duct.  6. Small volume ascites in the abdomen and pelvis, slightly " increased compared to the previous exam.    PET-CT (9/2/2020): IMPRESSION: In this patient with a history of gastric adenocarcinoma status post Whipple procedure and chemotherapy:  1. Postsurgical changes of Whipple procedure.  No abnormal hypermetabolism or focal masslike lesion at the surgical site, remaining pancreas, or stomach to suggest local recurrence.  2. Unchanged small peritoneal nodule in the right upper quadrant, similar to 8/21/2020, and progressively smaller and less metabolic since 3/13/2020.  Appearance on older studies suggested this was inflammatory such as fat necrosis, with remaining tissue suggesting scar.  Metastasis felt to be less likely.  Recommend continued attention on follow-up.  3. Unchanged wall thickening and enhancement of the common bile duct, nonspecific, may be reactive to Bilroth II surgical changes.  Recurrent cholangitis is a consideration in the correct clinical context.   4. Heterogenous enhancement of the liver suggesting hepatitis, perhaps related to recent (or recurrent) cholangitis or drug toxicity.  5. Findings of third-spacing, including new trace left pleural effusion, mild edema in the upper abdomen and paracolic gutters, and small volume free fluid in the pelvis.   6. Resolved right colitis.     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  Final pathology showed benign changes in the pancreas and also an incidental, T4, diffuse-type signet ring gastric cancer with a submucosal growth pattern.  The microscopic margin on the stomach was positive.  She has subsequently undergone a course of neoadjuvant chemotherapy, which she has had struggles with, and now presents for recommendations on management moving forward.  Her prior imaging showed a right upper quadrant peritoneal nodule, but this was felt to be most consistent with inflammatory etiology per radiology.  She has had  radiographic restaging with recent CTAP and PET-CT.  Again, these do not show clear evidence of disease progression or presence of metastatic disease.  I had a long, repeat discussion of gastric cancer and it's natural history in detail.  The type of cancer she has is aggressive and carries high risk for recurrence and distant spread.  In addition, it tends to involve most, or all of the stomach.  She has completed neoadjuvant therapy without clear evidence of disease progression or presence of metastatic disease, so this bodes positively for her.  Her main issue right now is that she needs to recover from chemotherapy.  Her neuropathy and need for wheelchair, weight loss in the past few months, presence of fluid third spacing on PET-CT, and low albumin at her most recent lab check suggest significant malnutrition and chemotherapy effect that will need to improve prior to proceeding with curative intent surgery.  She is currently scheduled for surgery on 9/22/2020.  I would plan to move this back a week at least given her last chemotherapy date and current condition.  We will work on this.  We will plan on drawing repeat CMP and pre-albumin the week prior to her scheduled surgery to be sure she has made adequate nutritional progress to proceed.  Will also plan on a phone visit at that time to be sure she is in adequate physical condition.  She has already seen a nutritionist.  We will talk to medical oncology, but she may benefit from PT and/or rehab.  She will also see PACS pre-operatively.  I did discuss the planned surgery today with her in detail.  As stated, the entire stomach may be involved with cancer, but given the type and pattern of disease in her stomach, the only way to determine this will be with pathologic evaluation.  In addition, she still has risk for peritoneal recurrence.  Given this, I would plan to proceed first with a diagnostic laparoscopy and peritoneal washings and biopsies as necessary,  followed by attempted curative intent resection approximately a week later if the laparoscopy showed no evidence of metastatic disease.  Given that laparoscopy will occur in a month, we will also obtain repeat CTAP prior to that time to reassess the known peritoneal nodule.  I discussed that her definitive resection would involve at least an open sub-total gastrectomy, lymphadenectomy, grant-en-y gastrojejunostomy (given prior Whipple w/ Billroth II reconstruction), and likely placement of feeding jejunostomy.  I would have the margin examined during surgery under frozen section.  If the margin were negative, then we would remove no further stomach.  If the margin were positive, then we would likely have to proceed with total gastrectomy, lymphadenectomy, grant-en-y esophagojejunostomy, and feeding jejunostomy.  The benefit to this surgery would be for potential prolongation of life and/or cure.  I also discussed risks including but not limited to death, bleeding, infection, COVID-19, pneumonia, cardiac events, renal failure, stroke, DVT/PE, UTI, anastomotic leaks, damage to surrounding structures, bowel obstruction, prolonged ileus, malnutrition w/ failure to thrive, need for prolonged enteral or parenteral nutrition, need for further procedures due to complications, chyle/lymph leak, marginal ulceration, internal hernias, ventral hernias, dumping syndrome, afferent/efferent limb syndrome, and chronic changes in the way she needs to eat.  I also discussed the risks of laparoscopy, which are the same as above minus the complications directly related to the gastrectomy and gastrointestinal reconstruction.  All questions were answered and the patient was in agreement with and understanding of the plan.     Total time spent with the patient was 45 minutes, of which more than half was counseling and coordination of care.    Again, thank you for allowing me to participate in the care of your patient.       Sincerely,      Yandel Mallory MD

## 2020-09-04 NOTE — NURSING NOTE
"Oncology Rooming Note    September 4, 2020 9:26 AM   Nathalie Bashir is a 54 year old female who presents for:    Chief Complaint   Patient presents with     Oncology Clinic Visit     Return: Malignant neoplasm of body of stomach     Initial Vitals: /56 (BP Location: Right arm, Patient Position: Sitting, Cuff Size: Adult Regular)   Pulse 101   Temp 98  F (36.7  C) (Oral)   Resp 16   Ht 1.753 m (5' 9\")   Wt 76.1 kg (167 lb 11.2 oz)   LMP 09/23/2014   SpO2 100%   BMI 24.76 kg/m   Estimated body mass index is 24.76 kg/m  as calculated from the following:    Height as of this encounter: 1.753 m (5' 9\").    Weight as of this encounter: 76.1 kg (167 lb 11.2 oz). Body surface area is 1.92 meters squared.  Moderate Pain (5) Comment: Data Unavailable   Patient's last menstrual period was 09/23/2014.  Allergies reviewed: Yes  Medications reviewed: Yes    Medications: Medication refills not needed today.  Pharmacy name entered into EPIC:    [facility] - [place], [place] - [address] [place] [address] [id number]  [facility] [id number] [place] [facility] [address] [place] [address]    Clinical concerns: N/A       Meliza Adamson CMA              "

## 2020-09-05 LAB — CANCER AG19-9 SERPL-ACNC: 15 U/ML (ref 0–37)

## 2020-09-08 DIAGNOSIS — Z11.59 ENCOUNTER FOR SCREENING FOR OTHER VIRAL DISEASES: Primary | ICD-10-CM

## 2020-09-08 PROBLEM — C16.8 MALIGNANT NEOPLASM OF OVERLAPPING SITES OF STOMACH (H): Status: ACTIVE | Noted: 2020-09-08

## 2020-09-09 ENCOUNTER — TELEPHONE (OUTPATIENT)
Dept: SURGERY | Facility: CLINIC | Age: 55
End: 2020-09-09

## 2020-09-09 DIAGNOSIS — C16.9 GASTRIC ADENOCARCINOMA (H): ICD-10-CM

## 2020-09-09 RX ORDER — LORAZEPAM 0.5 MG/1
0.5 TABLET ORAL EVERY 4 HOURS PRN
Qty: 30 TABLET | Refills: 5 | OUTPATIENT
Start: 2020-09-09

## 2020-09-09 NOTE — TELEPHONE ENCOUNTER
FUTURE VISIT INFORMATION      SURGERY INFORMATION:    Date: 20    Location: uu or    Surgeon:  Yandel Mallory MD     Anesthesia Type:  general    Procedure: Diagnostic laparoscopy with biopsies and peritoneal washings     Consult: ov     RECORDS REQUESTED FROM:       Primary Care Provider: Marianne Gonzalez APRN, CNP- [facility]     Most recent EKG+ Tracin20     Most recent ECHO: 20

## 2020-09-09 NOTE — TELEPHONE ENCOUNTER
Surgery is scheduled with Dr. Mallory on 9/29 and 10/8 at [facility].  Scheduled per orders.    Additional appointments:  9/23:   CT at 9:40 AM  Labs at 10:30 AM    9/25:  Mohamud at 11:30 AM  PAC at 1 PM  COVID at 2:30 PM    COVID-19 test: 10/5 at [place]  Post-op appointment: 10/30, as a in person    I called the patient and was able to confirm the scheduled dates.     I sent a surgery packet to them via [employer], per their preference. This contains education and directions for the surgery.     They are aware that they will get their exact times at their appointment with PAC.     Pt is aware that they will need to complete the COVID-19 test that was scheduled within 4 days of their surgery.

## 2020-09-10 DIAGNOSIS — C16.9 GASTRIC ADENOCARCINOMA (H): ICD-10-CM

## 2020-09-10 RX ORDER — LORAZEPAM 0.5 MG/1
0.5 TABLET ORAL EVERY 4 HOURS PRN
Qty: 30 TABLET | Refills: 5 | Status: CANCELLED | OUTPATIENT
Start: 2020-09-10

## 2020-09-11 NOTE — TELEPHONE ENCOUNTER
Received refill request for lorazepam.    Phoned Dena, she said she does not actually need a refill.  She was only taking during chemo.      Refill denied

## 2020-09-18 ENCOUNTER — HOSPITAL ENCOUNTER (OUTPATIENT)
Dept: PHYSICAL THERAPY | Facility: CLINIC | Age: 55
Setting detail: THERAPIES SERIES
End: 2020-09-18
Attending: SURGERY
Payer: MEDICARE

## 2020-09-18 DIAGNOSIS — C16.8 MALIGNANT NEOPLASM OF OVERLAPPING SITES OF STOMACH (H): ICD-10-CM

## 2020-09-18 PROCEDURE — 97110 THERAPEUTIC EXERCISES: CPT | Mod: GP | Performed by: PHYSICAL THERAPIST

## 2020-09-18 PROCEDURE — 97162 PT EVAL MOD COMPLEX 30 MIN: CPT | Mod: GP | Performed by: PHYSICAL THERAPIST

## 2020-09-21 ENCOUNTER — PATIENT OUTREACH (OUTPATIENT)
Dept: PALLIATIVE CARE | Facility: CLINIC | Age: 55
End: 2020-09-21

## 2020-09-21 ENCOUNTER — TELEPHONE (OUTPATIENT)
Dept: ONCOLOGY | Facility: CLINIC | Age: 55
End: 2020-09-21

## 2020-09-21 DIAGNOSIS — R10.13 ABDOMINAL PAIN, EPIGASTRIC: ICD-10-CM

## 2020-09-21 DIAGNOSIS — C16.3 MALIGNANT NEOPLASM OF PYLORIC ANTRUM (H): ICD-10-CM

## 2020-09-21 DIAGNOSIS — C16.9 GASTRIC ADENOCARCINOMA (H): ICD-10-CM

## 2020-09-21 DIAGNOSIS — D49.0 IPMN (INTRADUCTAL PAPILLARY MUCINOUS NEOPLASM): ICD-10-CM

## 2020-09-21 RX ORDER — OXYCODONE HYDROCHLORIDE 5 MG/1
5-10 TABLET ORAL 2 TIMES DAILY PRN
Qty: 60 TABLET | Refills: 0 | Status: SHIPPED | OUTPATIENT
Start: 2020-09-21 | End: 2020-10-05

## 2020-09-21 NOTE — PROGRESS NOTES
[facility]          OUTPATIENT PHYSICAL THERAPY ORTHOPEDIC EVALUATION  PLAN OF TREATMENT FOR OUTPATIENT REHABILITATION  (COMPLETE FOR INITIAL CLAIMS ONLY)  Patient's Last Name, First Name, M.I.  YOB: 1965  MckayNathalie       Provider s Name:  [facility]   Medical Record No.  [id number]   Start of Care Date:  09/18/20   Onset Date:  08/30/20   Type:     _X__PT   ___OT   ___SLP Medical Diagnosis:  stomach cancer     PT Diagnosis:  deconditioned   Visits from SOC:  1      _________________________________________________________________________________  Plan of Treatment/Functional Goals:  strengthening, ROM(endurance)      Goals  Goal Identifier: 1.    Goal Description: Increase  strength to 60#  Target Date: 11/07/20    Goal Identifier: 2  Goal Description: Increase hip strength to 4+/5 to improve pt's walking and sit to stand  Target Date: 11/07/20    Goal Identifier: 3  Goal Description: Pt will be independent and consistent w/ HEP  Target Date: 11/07/20          Therapy Frequency:  1 time/week  Predicted Duration of Therapy Intervention:  6 weeks    Yesy Cervantes, PT                 I CERTIFY THE NEED FOR THESE SERVICES FURNISHED UNDER        THIS PLAN OF TREATMENT AND WHILE UNDER MY CARE     (Physician co-signature of this document indicates review and certification of the therapy plan).                       Certification Date From:  09/18/20   Certification Date To:  11/07/20    Referring Provider:  Yandel Mallory MD     Initial Assessment        See Epic Evaluation Start of Care Date: 09/18/20

## 2020-09-21 NOTE — TELEPHONE ENCOUNTER
----- Message from Roxana Sweet PA-C sent at 9/17/2020  2:05 PM CDT -----  Please let her know her ultrasound shows no DVT. Thanks.  Roxana

## 2020-09-21 NOTE — PROGRESS NOTES
09/18/20 1400   General Information   Type of Visit Initial OP Ortho PT Evaluation   Start of Care Date 09/18/20   Referring Physician Yandel Mallory MD    Patient/Family Goals Statement Try to get some of the swelling down in my feet.   Orders Evaluate and Treat   Orders Comment needs to be stronger before surgery   Certification Required? Yes   Medical Diagnosis stomach cancer   Body Part(s)   Body Part(s) Knee   Presentation and Etiology   Pertinent history of current problem (include personal factors and/or comorbidities that impact the POC) Pt was diagnosed w/ stomach cancer Feb 2020.  Pt had whipple procedure.  Pt had chemo (last on 8/20/20).  Pt notes swelling in both feet. Pt notes neuropathy in B hands/ feet.  Pt reports numbness in her tongue but this is improving.  Pt notes stiffness in hands by evening .  Pt reports intermittent pain in her feet 6/10.   Pt to have laproscopy on 9/29/20.    PMHX:  depression.  L JOSE.,  3 bulging cervical discs.  Mod: comorbidities/ upcoming surgery   Impairments C. Swelling;G. Impaired balance;J. Burning;K. Numbness;L. Tingling   Onset date of current episode/exacerbation 08/30/20   Pain quality B. Dull;C. Aching;D. Burning  (numbness)   Pain exacerbation comment Opening bottles.  Pt notes it is hard to  as fingers are numb.  Manipulating small items or texting.  Cautious w/ walking due to the neuropathy in feet.  When fatigued noted difficulty w/ stairs   Pain/symptoms eased by C. Rest   Progression of symptoms since onset: Improved  (slightly)   Prior Level of Function   Functional Level Prior Comment has used SEC since L JOSE 5 years ago.  Walks dailly about 5 min in the house.     Current Level of Function   Patient role/employment history F. Retired   Fall Risk Screen   Fall screen completed by PT   Have you fallen 2 or more times in the past year? No   Have you fallen and had an injury in the past year? Yes   Timed Up and Go score (seconds) 12.1   Is patient a  fall risk? No   Fall screen comments done w/ SEC   Abuse Screen (yes response referral indicated)   Feels Unsafe at Home or Work/School no   Feels Threatened by Someone no   Does Anyone Try to Keep You From Having Contact with Others or Doing Things Outside Your Home? no   Vitals Signs   Heart Rate 99   SpO2 99   Knee Objective Findings   Side (if bilateral, select both right and left) Left   Gait/Locomotion Walks w/ SEC slight L limp   Knee ROM Comment shoulder flex R 135*, L 145*;  abd R 120*, L 145*; ER/ IR WFL's B.   B KNee WFL.  B hip flex WFL but is weak on L side.     Knee/Hip Strength Comments Hip flex R 4-/5,  L 3/5, Hip abd R 4-/5,  L 3/5.  quads B 5/5,  HS R 5/5, L 5-/5,  ankle DF R 5/5, L 5-/5.  :  R 50,53, 51 = 51.3#,  L 53, 54, 50= 52.3#   Knee Special Test Comments FACIT 44   Planned Therapy Interventions   Planned Therapy Interventions strengthening;ROM  (endurance)   Clinical Impression   Criteria for Skilled Therapeutic Interventions Met yes, treatment indicated   PT Diagnosis deconditioned   Influenced by the following impairments decreased strength, neuropathy,  swelling   Functional limitations due to impairments gripping, opening items, walking, stairs   Clinical Presentation Evolving/Changing   Clinical Presentation Rationale will be having surgery 9/29 and 10/8 --currently scheduled   Clinical Decision Making (Complexity) Moderate complexity   Therapy Frequency 1 time/week   Predicted Duration of Therapy Intervention (days/wks) 6 weeks   Risk & Benefits of therapy have been explained Yes   Patient, Family & other staff in agreement with plan of care Yes   Education Assessment   Barriers to Learning No barriers   Ortho Goal 1   Goal Identifier 1.     Goal Description Increase  strength to 60#   Target Date 11/07/20   Ortho Goal 2   Goal Identifier 2   Goal Description Increase hip strength to 4+/5 to improve pt's walking and sit to stand   Target Date 11/07/20   Ortho Goal 3   Goal  Identifier 3   Goal Description Pt will be independent and consistent w/ HEP   Target Date 11/07/20   Total Evaluation Time   PT Eval, Moderate Complexity Minutes (46620) 30   Therapy Certification   Certification date from 09/18/20   Certification date to 11/07/20   Medical Diagnosis stomach cancer     Thank you for this referral,    Yesy Cervantes, PT,  CEAS   #[id number]  [facility]  [phone]

## 2020-09-22 RX ORDER — PANTOPRAZOLE SODIUM 40 MG/1
TABLET, DELAYED RELEASE ORAL
Qty: 60 TABLET | Refills: 1 | Status: SHIPPED | OUTPATIENT
Start: 2020-09-22 | End: 2020-12-09

## 2020-09-23 ENCOUNTER — ANCILLARY PROCEDURE (OUTPATIENT)
Dept: CT IMAGING | Facility: CLINIC | Age: 55
End: 2020-09-23
Attending: SURGERY
Payer: MEDICARE

## 2020-09-23 DIAGNOSIS — C16.9 GASTRIC ADENOCARCINOMA (H): ICD-10-CM

## 2020-09-23 DIAGNOSIS — C16.8 MALIGNANT NEOPLASM OF OVERLAPPING SITES OF STOMACH (H): ICD-10-CM

## 2020-09-23 DIAGNOSIS — D70.9 NEUTROPENIC FEVER (H): ICD-10-CM

## 2020-09-23 DIAGNOSIS — R50.81 NEUTROPENIC FEVER (H): ICD-10-CM

## 2020-09-23 LAB
ALBUMIN SERPL-MCNC: 2.5 G/DL (ref 3.4–5)
ALP SERPL-CCNC: 255 U/L (ref 40–150)
ALT SERPL W P-5'-P-CCNC: 57 U/L (ref 0–50)
ANION GAP SERPL CALCULATED.3IONS-SCNC: 7 MMOL/L (ref 3–14)
AST SERPL W P-5'-P-CCNC: 77 U/L (ref 0–45)
BASOPHILS # BLD AUTO: 0 10E9/L (ref 0–0.2)
BASOPHILS NFR BLD AUTO: 0.9 %
BILIRUB SERPL-MCNC: 0.5 MG/DL (ref 0.2–1.3)
BUN SERPL-MCNC: 16 MG/DL (ref 7–30)
CALCIUM SERPL-MCNC: 8.3 MG/DL (ref 8.5–10.1)
CHLORIDE SERPL-SCNC: 104 MMOL/L (ref 94–109)
CO2 SERPL-SCNC: 27 MMOL/L (ref 20–32)
CREAT SERPL-MCNC: 1.02 MG/DL (ref 0.52–1.04)
DIFFERENTIAL METHOD BLD: ABNORMAL
EOSINOPHIL # BLD AUTO: 0.3 10E9/L (ref 0–0.7)
EOSINOPHIL NFR BLD AUTO: 6.3 %
ERYTHROCYTE [DISTWIDTH] IN BLOOD BY AUTOMATED COUNT: 18.1 % (ref 10–15)
GFR SERPL CREATININE-BSD FRML MDRD: 62 ML/MIN/{1.73_M2}
GLUCOSE SERPL-MCNC: 87 MG/DL (ref 70–99)
HCT VFR BLD AUTO: 24.6 % (ref 35–47)
HGB BLD-MCNC: 7.3 G/DL (ref 11.7–15.7)
IMM GRANULOCYTES # BLD: 0 10E9/L (ref 0–0.4)
IMM GRANULOCYTES NFR BLD: 0.2 %
LYMPHOCYTES # BLD AUTO: 1.4 10E9/L (ref 0.8–5.3)
LYMPHOCYTES NFR BLD AUTO: 30.6 %
MCH RBC QN AUTO: 26.9 PG (ref 26.5–33)
MCHC RBC AUTO-ENTMCNC: 29.7 G/DL (ref 31.5–36.5)
MCV RBC AUTO: 91 FL (ref 78–100)
MONOCYTES # BLD AUTO: 0.5 10E9/L (ref 0–1.3)
MONOCYTES NFR BLD AUTO: 10.3 %
NEUTROPHILS # BLD AUTO: 2.3 10E9/L (ref 1.6–8.3)
NEUTROPHILS NFR BLD AUTO: 51.7 %
NRBC # BLD AUTO: 0 10*3/UL
NRBC BLD AUTO-RTO: 0 /100
PLATELET # BLD AUTO: 263 10E9/L (ref 150–450)
POTASSIUM SERPL-SCNC: 4.1 MMOL/L (ref 3.4–5.3)
PREALB SERPL IA-MCNC: 15 MG/DL (ref 15–45)
PROT SERPL-MCNC: 6 G/DL (ref 6.8–8.8)
RBC # BLD AUTO: 2.71 10E12/L (ref 3.8–5.2)
SODIUM SERPL-SCNC: 137 MMOL/L (ref 133–144)
WBC # BLD AUTO: 4.5 10E9/L (ref 4–11)

## 2020-09-23 RX ORDER — HEPARIN SODIUM (PORCINE) LOCK FLUSH IV SOLN 100 UNIT/ML 100 UNIT/ML
500 SOLUTION INTRAVENOUS ONCE
Status: COMPLETED | OUTPATIENT
Start: 2020-09-23 | End: 2020-09-23

## 2020-09-23 RX ORDER — IOPAMIDOL 755 MG/ML
103 INJECTION, SOLUTION INTRAVASCULAR ONCE
Status: COMPLETED | OUTPATIENT
Start: 2020-09-23 | End: 2020-09-23

## 2020-09-23 RX ADMIN — HEPARIN SODIUM (PORCINE) LOCK FLUSH IV SOLN 100 UNIT/ML 500 UNITS: 100 SOLUTION at 11:22

## 2020-09-23 RX ADMIN — IOPAMIDOL 103 ML: 755 INJECTION, SOLUTION INTRAVASCULAR at 10:22

## 2020-09-23 NOTE — DISCHARGE INSTRUCTIONS

## 2020-09-24 DIAGNOSIS — E87.6 HYPOKALEMIA: ICD-10-CM

## 2020-09-24 RX ORDER — POTASSIUM CHLORIDE 1500 MG/1
TABLET, EXTENDED RELEASE ORAL
Qty: 30 TABLET | Refills: 1 | Status: SHIPPED | OUTPATIENT
Start: 2020-09-24 | End: 2022-05-03

## 2020-09-24 ASSESSMENT — ENCOUNTER SYMPTOMS
EXERCISE INTOLERANCE: 0
TINGLING: 0
HYPOTENSION: 1
SYNCOPE: 0
SLEEP DISTURBANCES DUE TO BREATHING: 0
PARALYSIS: 0
LEG PAIN: 1
TREMORS: 0
LOSS OF CONSCIOUSNESS: 0
WEAKNESS: 0
LIGHT-HEADEDNESS: 0
SPEECH CHANGE: 0
HEADACHES: 0
HYPERTENSION: 0
DIZZINESS: 0
SEIZURES: 0
MEMORY LOSS: 0
PALPITATIONS: 0
ORTHOPNEA: 0
DISTURBANCES IN COORDINATION: 0
NUMBNESS: 1

## 2020-09-25 ENCOUNTER — OFFICE VISIT (OUTPATIENT)
Dept: SURGERY | Facility: CLINIC | Age: 55
End: 2020-09-25
Payer: MEDICARE

## 2020-09-25 ENCOUNTER — PRE VISIT (OUTPATIENT)
Dept: SURGERY | Facility: CLINIC | Age: 55
End: 2020-09-25

## 2020-09-25 ENCOUNTER — ANESTHESIA EVENT (OUTPATIENT)
Dept: SURGERY | Facility: CLINIC | Age: 55
End: 2020-09-25
Payer: MEDICARE

## 2020-09-25 ENCOUNTER — OFFICE VISIT (OUTPATIENT)
Dept: SURGERY | Facility: CLINIC | Age: 55
End: 2020-09-25
Attending: SURGERY
Payer: MEDICARE

## 2020-09-25 VITALS
SYSTOLIC BLOOD PRESSURE: 100 MMHG | BODY MASS INDEX: 25.48 KG/M2 | WEIGHT: 172 LBS | DIASTOLIC BLOOD PRESSURE: 67 MMHG | OXYGEN SATURATION: 100 % | RESPIRATION RATE: 20 BRPM | HEART RATE: 94 BPM | HEIGHT: 69 IN | TEMPERATURE: 98.6 F

## 2020-09-25 VITALS
SYSTOLIC BLOOD PRESSURE: 117 MMHG | WEIGHT: 171.6 LBS | HEIGHT: 69 IN | DIASTOLIC BLOOD PRESSURE: 72 MMHG | OXYGEN SATURATION: 97 % | HEART RATE: 120 BPM | BODY MASS INDEX: 25.42 KG/M2

## 2020-09-25 DIAGNOSIS — C16.9 MALIGNANT NEOPLASM OF STOMACH, UNSPECIFIED LOCATION (H): Primary | ICD-10-CM

## 2020-09-25 DIAGNOSIS — Z01.818 PREOP EXAMINATION: Primary | ICD-10-CM

## 2020-09-25 DIAGNOSIS — C16.8 MALIGNANT NEOPLASM OF OVERLAPPING SITES OF STOMACH (H): ICD-10-CM

## 2020-09-25 PROCEDURE — 40001009 ZZH VIDEO/TELEPHONE VISIT; NO CHARGE

## 2020-09-25 ASSESSMENT — MIFFLIN-ST. JEOR
SCORE: 1447.75
SCORE: 1441.63

## 2020-09-25 ASSESSMENT — PAIN SCALES - GENERAL
PAINLEVEL: MODERATE PAIN (5)
PAINLEVEL: MODERATE PAIN (5)

## 2020-09-25 ASSESSMENT — LIFESTYLE VARIABLES: TOBACCO_USE: 1

## 2020-09-25 NOTE — ANESTHESIA PREPROCEDURE EVALUATION
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) [C16.8]   Procedure(s):  Diagnostic laparoscopy with biopsies and peritoneal washings     LABS:  CBC:   Lab Results   Component Value Date    WBC 4.5 2020    WBC 8.0 2020    HGB 7.3 (L) 2020    HGB 9.4 (L) 2020    HCT 24.6 (L) 2020    HCT 30.9 (L) 2020     2020     2020     BMP:   Lab Results   Component Value Date     2020     2020    POTASSIUM 4.1 2020    POTASSIUM 4.1 2020    CHLORIDE 104 2020    CHLORIDE 110 (H) 2020    CO2 27 2020    CO2 26 2020    BUN 16 2020    BUN 11 2020    CR 1.02 2020    CR 0.84 2020    GLC 87 2020    GLC 99 2020     COAGS:   Lab Results   Component Value Date    INR 1.32 (H) 2020    FIBR 348 2020     POC:   Lab Results   Component Value Date    BGM 82 2020    HCG Negative 2020     OTHER:   Lab Results   Component Value Date    LACT 2.4 (H) 2020    PETRA 8.3 (L) 2020    PHOS 3.7 2020    MAG 2.6 (H) 2020    ALBUMIN 2.5 (L) 2020    PROTTOTAL 6.0 (L) 2020    ALT 57 (H) 2020    AST 77 (H) 2020    ALKPHOS 255 (H) 2020    BILITOTAL 0.5 2020    LIPASE 22 (L) 2020    AMYLASE 13 (L) 2020    TSH 4.16 (H) 2020    T4 1.38 2020    CRP 11.0 (H) 2020    SED 27 2020        Preop Vitals    BP Readings from Last 3 Encounters:   20 100/67   20 117/72   20 108/56    Pulse Readings from Last 3 Encounters:   20 94   20 120   20 101      Resp Readings from Last 3 Encounters:   20 20   20 16   20 (P) 16    SpO2 Readings from Last 3 Encounters:   20 100%  "  09/25/20 97%   09/04/20 100%      Temp Readings from Last 1 Encounters:   09/25/20 98.6  F (37  C) (Oral)    Ht Readings from Last 1 Encounters:   09/25/20 1.74 m (5' 8.5\")      Wt Readings from Last 1 Encounters:   09/25/20 78 kg (172 lb)    Estimated body mass index is 25.77 kg/m  as calculated from the following:    Height as of this encounter: 1.74 m (5' 8.5\").    Weight as of this encounter: 78 kg (172 lb).     LDA:  Peripheral IV (Active)   Number of days:        Port A Cath Single 03/06/20 Right Chest wall (Active)   Access Date 09/23/20 09/23/20 1100   Access Attempts 1 09/23/20 1100   Gauge Power noncoring 90 degree bend;20 gauge;3/4 inch 09/23/20 1100   Site Assessment WDL 09/23/20 1100   Line Status Heparin locked 09/23/20 1100   Extravasation? No 09/23/20 1100   Dressing Intervention Transparent;New dressing 09/23/20 1100   Line Necessity Yes, meets criteria 09/23/20 1100   De-Access Date 09/23/20 09/23/20 1100   Number of days: 203        Past Medical History:   Diagnosis Date     Allergic rhinitis      Cancer (H)     stomach cancer     Depression      Lumbago      Migraine      Other chronic pain     low back     Sacroiliitis (H)     low back pain     Trochanteric bursitis     leg length discrrepancy, hip pain      Past Surgical History:   Procedure Laterality Date     ARTHROPLASTY HIP Left 2016     BACK SURGERY      L4-5 decompression     C REPAIR DETACH RETINA,SCLERAL BUCKLE       CATARACT IOL, RT/LT       CHOLECYSTECTOMY N/A 1/28/2020    Procedure: Cholecystectomy;  Surgeon: Yandel Mallory MD;  Location: [facility] OR     ENDOSCOPIC RETROGRADE CHOLANGIOPANCREATOGRAM N/A 7/27/2020    Procedure: ENDOSCOPIC RETROGRADE CHOLANGIOPANCREATOGRAPHY  **Latex Allergy** with jejunal biopsies;  Surgeon: Jeet Aviles MD;  Location: [facility] OR     ESOPHAGOSCOPY, GASTROSCOPY, DUODENOSCOPY (EGD), COMBINED N/A 3/3/2020    Procedure: ESOPHAGOGASTRODUODENOSCOPY, WITH ENDOSCOPIC US (enoxaparin);  Surgeon: Bakari Plata " MD Jovan;  Location: [facility] GI     EYE SURGERY       IR CHEST PORT PLACEMENT > 5 YRS OF AGE  3/26/2020     OPEN REDUCTION INTERNAL FIXATION RODDING INTRAMEDULLARY FEMUR Left 12/23/2014    Procedure: OPEN REDUCTION INTERNAL FIXATION RODDING INTRAMEDULLARY FEMUR;  Surgeon: Arnol Santiago MD;  Location: [facility]     ORTHOPEDIC SURGERY       PICC DOUBLE LUMEN PLACEMENT Right 02/07/2020    5 fr double lumen 41 cm     REMOVE HARDWARE LOWER EXTREMITY Left 4/7/2015    Procedure: REMOVE HARDWARE LOWER EXTREMITY;  Surgeon: Arnol Santiago MD;  Location: [facility]     REMOVE HARDWARE RODDING INTRAMEDULLARY FEMUR Left 12/17/2015    Procedure: REMOVE HARDWARE RODDING INTRAMEDULLARY FEMUR;  Surgeon: Arnol Santiago MD;  [facility]: [facility]     RESECT BONE LOWER EXTREMITY Left 12/23/2014    Procedure: RESECT BONE LOWER EXTREMITY;  Surgeon: Arnol Santiago MD;  Location: [facility]     WHIPPLE PROCEDURE N/A 1/28/2020    Procedure: Open Whipple procedure and Cholecystectomy;  Surgeon: Yandel Mallory MD;  Location: [facility]      Allergies   Allergen Reactions     Gluten Meal Palpitations     Augmentin Rash     Patient tolerated Zosyn in 7/2020     Oxycontin [Oxycodone]      Confusion, loopy, oxycodone is tolerated     Pregabalin      interfered with Cymbalta     Topiramate      Tongue numbness, taste alteration, irritable      Ketorolac Headache     Acetaminophen Nausea     Urine retention       Dust Mite Extract      Gabapentin Dizziness and GI Disturbance     Latex Rash     Methadone Fatigue     Mold      Naprosyn [Naproxen] Dizziness and GI Disturbance     Seasonal Allergies         Anesthesia Evaluation     . Pt has had prior anesthetic. Type: General and MAC    No history of anesthetic complications          ROS/MED HX    ENT/Pulmonary:     (+)LISHA risk factors snores loudly, allergic rhinitis, tobacco use, Past use , . .   (-) recent URI   Neurologic:     (+)neuropathy - hands and feet, migraines,     Cardiovascular:  - neg  cardiovascular ROS   (+) ----. : . . . :. . Previous cardiac testing Echodate:7/20/20results:Interpretation Summary  Global and regional left ventricular function is normal with an EF of 60-65%.  Right ventricular function, chamber size, wall motion, and thickness are  normal.  Pulmonary artery systolic pressure cannot be assessed.  The inferior vena cava is normal.  No pericardial effusion is present.  Previous study not available for comparison.date: results:ECG reviewed date:8/21/20 results:SR with short [place] date: results:          METS/Exercise Tolerance:  3 - Able to walk 1-2 blocks without stopping   Hematologic:     (+) Anemia, -     (-) History of Transfusion   Musculoskeletal:   (+)  other musculoskeletal- chronic neck and back pain      GI/Hepatic:     (+) GERD Asymptomatic on medication, Other GI/Hepatic s/p whipple for IPMN, crohn's disease      Renal/Genitourinary:  - ROS Renal section negative       Endo:  - neg endo ROS       Psychiatric:     (+) psychiatric history depression      Infectious Disease:  - neg infectious disease ROS      (-) Recent Fever   Malignancy:   (+) Malignancy History of GI  GI CA  Active status post Chemo,         Other:    (+) No chance of pregnancy H/O Chronic Pain,H/O chronic opiod use ,                        PHYSICAL EXAM:   Mental Status/Neuro: A/A/O   Airway: Facies: Feasible  Mallampati: I  Mouth/Opening: Full  TM distance: > 6 cm  Neck ROM: Full   Respiratory: Auscultation: CTAB     Resp. Rate: Normal     Resp. Effort: Normal      CV: Rhythm: Regular  Rate: Age appropriate  Heart: Normal Sounds  Edema: None   Comments: 1 right upper cracked (#7)     Dental: Normal Dentition                Assessment:   ASA SCORE: 3    H&P: History and physical reviewed and following examination; no interval change.   Smoking Status:  Non-Smoker/Unknown   NPO Status: NPO Appropriate     Plan:   Anes. Type:  General   Pre-Medication: None   Induction:  IV (Standard)   Airway: ETT; Oral    Access/Monitoring: PIV; 2nd PIV   Maintenance: Balanced     Postop Plan:   Postop Pain: Opioids  Postop Sedation/Airway: Not planned     PONV Management:   Adult Risk Factors: Female, Non-Smoker, Postop Opioids   Prevention: Ondansetron, Dexamethasone     CONSENT: Direct conversation   Plan and risks discussed with: Patient   Blood Products: Consented (ALL Blood Products)                PAC Discussion and Assessment    ASA Classification: 3  Case is suitable for: [place]  Anesthetic techniques and relevant risks discussed: MAC with GA as backup and GA  Invasive monitoring and risk discussed:   Types:   Possibility and Risk of blood transfusion discussed:   NPO instructions given:   Additional anesthetic preparation and risks discussed:   Needs early admission to pre-op area:   Other:     PAC Resident/NP Anesthesia Assessment:  Nathalie Bashir is a 54 year old female scheduled for a Diagnostic laparoscopy with biopsies and peritoneal washings on 10/13/20 and an Open subtotal gastectomy, possible total gastectomy, feeding jejunostomy insertion on 10/22/20 by Dr. Mallory in treatment of malignant neoplasm of overlapping sites of stomach.  PAC referral for risk assessment and optimization for anesthesia with comorbid conditions of: allergic rhinitis, anemia, neuropathy, migraines, history of IPMN, chronic neck pain, chronic back pain, crohn's disease, GERD and depression.     Pre-operative considerations:  1.  Cardiac:  Functional status- METS 3.   Due to her chemo related neuropathy, her exercise tolerance is very limited.  She can only walk a block at a time.  She has no known cardiac conditions. She had an echocardiogram on 7/20/20 that showed an EF of 60-65% and no wall motion abnormalities.  High risk surgery with 0.9%risk of major adverse cardiac event.   2.  Pulm:  Airway feasible.  LISHA risk: low.  She quit smoking in January 2020.    3.  GI:  Risk of PONV score = 3.  If > 2, anti-emetic intervention  recommended.  GERD is well controlled with protonix.  4. Heme: She has chronic anemia.  Her recent hemoglobin though was quite low at 7.3.  She discussed this already with Dr. Mallory and postponed both of the above surgeries (previously 9/25/ and 10/8) to get the anemia managed better.  She said they discussed doing a blood transfusion.  Please see future notes for treatment that occurred.  Will order a repeat T&S for DOS.  5. Musculoskeletal: She has chronic neck and back pain.  She typically takes a total of 20mg of oxycodone per day.  Morphine equivalent dosing = 30mg.  Consider cautious positioning.    6. Neuro: Hold imitrex 24 hours prior to surgery.  She has significant peripheral neuropathy s/p chemo.  Consider fall risk precautions.  7. Access:  Right chest portacath.  8. Endo:  S/p whipple 1/2020 for IPMN.      VTE risk: 1.8%    **Addendum (10/9/20):  Saw repeat hgb as ordered by Dr. Mallory from 10/2/20 was still low at 7.6.  Contacted Dr. Mallory on 10/6/20 to discuss pre-op plan.  He noted plan to proceed with Procrit and a possible transfusion.  Per chart review, she has since also followed up with Dr. Pollard who also ordered Infed and further labs.  Kenzie Ace RN is working with patient on plan and scheduling.  Dr. Mallory and Atul to follow prior to surgery.**    Patient is optimized and is acceptable candidate for the proposed procedure.  No further diagnostic evaluation is needed.     **For further details of assessment, testing, and physical exam please see H and P completed on same date.          Yudy Ace DNP, RN, APRN      Reviewed and Signed by PAC Mid-Level Provider/Resident  Mid-Level Provider/Resident: Yudy Ace DNP, RN, APRN  Date: 9/29/20  Time: 1231    Attending Anesthesiologist Anesthesia Assessment:        Anesthesiologist:   Date:   Time:   Pass/Fail:   Disposition:     PAC Pharmacist Assessment:        Pharmacist:   Date:   Time:    Yudy Ace, NINA CNP

## 2020-09-25 NOTE — PATIENT INSTRUCTIONS
Preparing for Your Surgery      Name:  Nathalie Bashir   MRN:  [id number]   :  1965   Today's Date:  2020       Arriving for surgery: You will be called 1-2 days prior to surgery by Preadmission Nursing, [phone].       Please come to:       [facility] [address]  [address] [place]  [address]     -    Please proceed to the Surgery Lounge on the 3rd floor. [phone]?     - ?If you are in need of directions, wheelchair or escort please stop at the Information Desk in the lobby.  Inform the information person that you are here for surgery; a wheelchair and escort will be provided to the Surgery Lounge .?     Restrictions due to COVID 19:  Patients are allowed one visitor in the pre-op period  All visitors must wear a mask  No visitors under 18  No ill visitors   parking is not available     What can I eat or drink?  -  You may eat and drink normally for up to 8 hours before your surgery.   -  You may have clear liquids until 2 hours before surgery.   Examples of clear liquids:  Water  Clear broth  Juices (apple, white grape, white cranberry  and cider) without pulp  Noncarbonated, powder based beverages  (lemonade and Deep-Aid)  Sodas (Sprite, 7-Up, ginger ale and seltzer)  Coffee or tea (without milk or cream)  Gatorade    -  No Alcohol for at least 24 hours before surgery     Which medicines can I take?    Hold Aspirin for 7 days before surgery.   Hold Multivitamins for 7 days before surgery.  Hold Supplements for 7 days before surgery.  Hold Ibuprofen (Advil, Motrin) for 1 day before surgery--unless otherwise directed by surgeon.  Hold Naproxen (Aleve) for 4 days before surgery.  Hold Sumatriptan(Imitrex) for 24 hours prior to surgery.     -  DO NOT take these medications the day of surgery:    Potassium    Simethicone    -  PLEASE TAKE these medications the day of surgery:    Citalopram(Celexa)   Famotidine(Pepcid)    Pantoprazole(Protonix)    -As  Needed:    Hydroxyzine(Atarax)   Loperamide(Imodium)    Ondansetron(Zofran)   Oxycodone(Roxicodone)      How do I prepare myself?  - Please shower the evening before and the morning of surgery using Scrubcare or Hibiclens soap.    Use this soap only from the neck to your toes.     Leave the soap on your skin for one minute--then rinse thoroughly.      You may use your own shampoo and conditioner; no other hair products.   - Please remove all jewelry and body piercings.  - No lotions, deodorants or fragrance.  - No makeup or fingernail polish.   - Bring your ID and insurance card.    - All patients are required to have a Covid-19 test within 4 days of surgery/procedure.      -Patients will be contacted by the [facility] scheduling team within 1 week of surgery to make an appointment.      - Patients may call the Scheduling team at [phone] if they have not been scheduled within 4 days of  surgery.      ALL PATIENTS GOING HOME THE SAME DAY OF SURGERY ARE REQUIRED TO HAVE A RESPONSIBLE ADULT TO DRIVE AND BE IN ATTENDANCE WITH THEM FOR 24 HOURS FOLLOWING SURGERY     Questions or Concerns:    - For any questions regarding the day of surgery or your hospital stay, please contact the Pre Admission Nursing Office at [phone].       - If you have health changes between today and your surgery please call your surgeon.       For questions after surgery please call your surgeons office.           Using an Incentive Spirometer  Begin to use 1 week prior to 2nd surgery, use 4 times per day 5 breaths each.     An incentive spirometer is a device that helps you do deep breathing exercises. These exercises expand your lungs, aid in circulation, and help prevent pneumonia. Deep breathing exercises also help you breathe better and improve the function of your lungs by:    Keeping your lungs clear    Strengthening your breathing muscles    Helping prevent respiratory complications or problems  The incentive spirometer  gives you a way to take an active part in your care. A nurse or therapist will teach you breathing exercises. To do these exercises, you will breathe in through your mouth and not your nose. The incentive spirometer only works correctly if you breathe in through your mouth.    Steps to clear lungs  Step 1. Exhale normally. Then, inhale normally.    Relax and breathe out.  Step 2. Place your lips tightly around the mouthpiece.    Make sure the device is upright and not tilted.  Step 3. Inhale as much air as you can through the mouthpiece (don't breath through your nose).    Inhale slowly and deeply.    Hold your breath long enough to keep the balls or disk raised for at least 3 to 5 seconds, or as instructed by your healthcare provider.  Step 4. Repeat the exercise regularly.    Begin using the Incentive Spirometer one week prior to your surgery, 4 times per day-5 breaths each.

## 2020-09-25 NOTE — PROGRESS NOTES
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  The permanent Whipple specimen came back with an incidental, T4, diffuse type signet ring gastric cancer with a microscopically positive margin.     Pertinent Oncologic History: 54 F w/ history of atypical abdominal symptoms and GI complaints including nausea and intermittent diarrhea.  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  While on surveillance, her most recent EUS showed increase in the size of the cyst described as dilation of the main pancreatic duct in the head of the pancreas to 10.4 mm.  MRI 9 mm, and CT 8 mm.  No other suspicious features.  FNA and fluid analysis showed mucinous epithelium, a string sign, high fluid amylase, and normal fluid CEA.   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  The permanent Whipple specimen came back with an incidental, T4, diffuse type signet ring gastric cancer with a microscopically positive margin.  She recovered from her Whipple, then completed a course of neoadjuvant taxotere, 5-FU, and oxaliplatin, which she has had some difficulty with (including 2 hospitalizations).  She has been recently restaged on 8/21/2020 via CTAP and on 9/2/2020 via PET-CT without clear evidence of disease progression or obvious metastatic disease.  There is a right upper quadrant peritoneal nodule that has been stable imaging since late July and is not PET-avid.  I saw her in clinic on 9/4, where she was deconditioned and somewhat malnourished.  We delayed her surgery for diagnostic laparoscopy and the definitive resection as a result.  She now returns with repeat staging imaging and labs to discuss whether or not she is ready to proceed with  "surgery.     Pertinent Exam: Abdomen soft, non-tender, and non-distended.  No jaundice or scleral icterus.  Her midline incision is well healed.  She is no longer in a wheelchair and her neuropathy has improved although it is still present.  She has had falls because of neuropathy, but none recently.     /72   Pulse 120   Ht 1.753 m (5' 9\")   Wt 77.8 kg (171 lb 9.6 oz)   LMP 09/23/2014   SpO2 97%   BMI 25.34 kg/m       HISTORY OF PRESENT ILLNESS:  The patient is feeling much better and stronger since 3 weeks ago.  She is no longer using a wheelchair.  Her weight is increasing and she is eating very well.  Her last dose of chemotherapy was approximately 5 weeks ago.  She has not noted any blood in her stools or dark colored stools.     Pertinent Work-Up/Findings:     Labs (9/23/2020): CBC w/ worsened anemia (Hgb 9.4 --> 7.3) and WBC of 4.5.  CMP w/ albumin 2.5, but increased pre-albumin (10 --> 15), normal bilirubin/AST/ALT, and alk phos 255 w/ very mildly elevated LFTs.  CEA 4.0.  CA 19-9 = 15.     CT CAP (9/23/2020): IMPRESSION:  In this patient with history of gastric cancer, status post Whipple procedure and Billroth II construction: No definite evidence of local recurrence or metastatic disease.  Stable small peritoneal nodule in the right upper quadrant, possibly fat necrosis as depicted on prior exams.  Nonetheless, attention on follow-up is recommended.  Stable wall thickening involving the hepatic duct, likely reactive in nature in the setting of complex surgery.  Persistent heterogeneous enhancement of the liver, nonspecific and could represent drug toxicity.  Mild generalized anasarca, which is slightly more prominent compared to prior exam.     PET-CT (9/2/2020): IMPRESSION: In this patient with a history of gastric adenocarcinoma status post Whipple procedure and chemotherapy:  1. Postsurgical changes of Whipple procedure.  No abnormal hypermetabolism or focal masslike lesion at the surgical " site, remaining pancreas, or stomach to suggest local recurrence.  2. Unchanged small peritoneal nodule in the right upper quadrant, similar to 8/21/2020, and progressively smaller and less metabolic since 3/13/2020.  Appearance on older studies suggested this was inflammatory such as fat necrosis, with remaining tissue suggesting scar.  Metastasis felt to be less likely.  Recommend continued attention on follow-up.  3. Unchanged wall thickening and enhancement of the common bile duct, nonspecific, may be reactive to Bilroth II surgical changes.  Recurrent cholangitis is a consideration in the correct clinical context.   4. Heterogenous enhancement of the liver suggesting hepatitis, perhaps related to recent (or recurrent) cholangitis or drug toxicity.  5. Findings of third-spacing, including new trace left pleural effusion, mild edema in the upper abdomen and paracolic gutters, and small volume free fluid in the pelvis.   6. Resolved right colitis.     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  Final pathology showed benign changes in the pancreas and also an incidental, T4, diffuse-type signet ring gastric cancer with a submucosal growth pattern.  The microscopic margin on the stomach was positive.  She has subsequently undergone a course of neoadjuvant chemotherapy, which she has had struggles with, and now presents for recommendations on management moving forward.  At the last visit, she was deconditioned and with relative malnutrition.  She has improved significantly since last visit.  She is eating well, is out of a wheelchair, has gained weight, and has increased pre-albumin but stable albumin.  Restaging from yesterday shows no clear evidence of metastatic disease.  I had a long, repeat discussion of gastric cancer and it's natural history in detail.  The type of cancer she has is aggressive and carries high risk for  recurrence and distant spread.  In addition, it tends to involve most, or all of the stomach.  She has completed neoadjuvant therapy without clear evidence of disease progression or presence of metastatic disease, so this bodes positively for her.  Her main issue right now is that she needs to boost her nutrition slightly more.  I discussed that I we would regret not improving her nutrition if we have a complication related to healing.  I also believe she will be able to do it if we postpone by another couple of weeks.  She was in agreement with this.  I discussed that I also want to have her CBC drawn this upcoming Monday to see if she is still dropping.  If she is, she may need evaluation for occult GI bleed prior to her surgery.  She may need a transfusion prior to surgery anyways to avoid issues with very low hemoglobin post-operatively.  We will work on all of this scheduling and she will see PACS today.  I did discuss the planned surgery today with her in detail.  As stated, the entire stomach may be involved with cancer, but given the type and pattern of disease in her stomach, the only way to determine this will be with pathologic evaluation.  In addition, she still has risk for peritoneal recurrence.  Given this, I would plan to proceed first with a diagnostic laparoscopy and peritoneal washings and biopsies as necessary, followed by attempted curative intent resection approximately a week later if the laparoscopy showed no evidence of metastatic disease.  I discussed that her definitive resection would involve at least an open sub-total gastrectomy, lymphadenectomy, grant-en-y gastrojejunostomy (given prior Whipple w/ Billroth II reconstruction), and likely placement of feeding jejunostomy.  I would have the margin examined during surgery under frozen section.  If the margin were negative, then we would remove no further stomach.  If the margin were positive, then we would likely have to proceed with total  gastrectomy, lymphadenectomy, grant-en-y esophagojejunostomy, and feeding jejunostomy.  The benefit to this surgery would be for potential prolongation of life and/or cure.  I also discussed risks including but not limited to death, bleeding, infection, COVID-19, pneumonia, cardiac events, renal failure, stroke, DVT/PE, UTI, anastomotic leaks, damage to surrounding structures, bowel obstruction, prolonged ileus, malnutrition w/ failure to thrive, need for prolonged enteral or parenteral nutrition, need for further procedures due to complications, chyle/lymph leak, marginal ulceration, internal hernias, ventral hernias, dumping syndrome, afferent/efferent limb syndrome, and chronic changes in the way she needs to eat.  I also discussed the risks of laparoscopy, which are the same as above minus the complications directly related to the gastrectomy and gastrointestinal reconstruction.  All questions were answered and the patient was in agreement with and understanding of the plan.     Total time spent with the patient was 45 minutes, of which more than half was counseling and coordination of care.

## 2020-09-25 NOTE — LETTER
9/25/2020         RE: Nathalie Bashir  [address] [id number]        Dear Colleague,    Thank you for referring your patient, Nathalie Bashir, to the [facility]. Please see a copy of my visit note below.    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  The permanent Whipple specimen came back with an incidental, T4, diffuse type signet ring gastric cancer with a microscopically positive margin.     Pertinent Oncologic History: 54 F w/ history of atypical abdominal symptoms and GI complaints including nausea and intermittent diarrhea.  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  While on surveillance, her most recent EUS showed increase in the size of the cyst described as dilation of the main pancreatic duct in the head of the pancreas to 10.4 mm.  MRI 9 mm, and CT 8 mm.  No other suspicious features.  FNA and fluid analysis showed mucinous epithelium, a string sign, high fluid amylase, and normal fluid CEA.   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  The permanent Whipple specimen came back with an incidental, T4, diffuse type signet ring gastric cancer with a microscopically positive margin.  She recovered from her Whipple, then completed a course of neoadjuvant taxotere, 5-FU, and oxaliplatin, which she has had some difficulty with (including 2 hospitalizations).  She has been recently restaged on 8/21/2020 via CTAP and on 9/2/2020 via PET-CT without clear evidence of disease progression or obvious metastatic disease.  There is a right upper quadrant peritoneal nodule that has been stable imaging since late July and is not PET-avid.  I saw her in clinic on 9/4, where she was deconditioned  "and somewhat malnourished.  We delayed her surgery for diagnostic laparoscopy and the definitive resection as a result.  She now returns with repeat staging imaging and labs to discuss whether or not she is ready to proceed with surgery.     Pertinent Exam: Abdomen soft, non-tender, and non-distended.  No jaundice or scleral icterus.  Her midline incision is well healed.  She is no longer in a wheelchair and her neuropathy has improved although it is still present.  She has had falls because of neuropathy, but none recently.     /72   Pulse 120   Ht 1.753 m (5' 9\")   Wt 77.8 kg (171 lb 9.6 oz)   LMP 09/23/2014   SpO2 97%   BMI 25.34 kg/m       HISTORY OF PRESENT ILLNESS:  The patient is feeling much better and stronger since 3 weeks ago.  She is no longer using a wheelchair.  Her weight is increasing and she is eating very well.  Her last dose of chemotherapy was approximately 5 weeks ago.  She has not noted any blood in her stools or dark colored stools.     Pertinent Work-Up/Findings:     Labs (9/23/2020): CBC w/ worsened anemia (Hgb 9.4 --> 7.3) and WBC of 4.5.  CMP w/ albumin 2.5, but increased pre-albumin (10 --> 15), normal bilirubin/AST/ALT, and alk phos 255 w/ very mildly elevated LFTs.  CEA 4.0.  CA 19-9 = 15.     CT CAP (9/23/2020): IMPRESSION:  In this patient with history of gastric cancer, status post Whipple procedure and Billroth II construction: No definite evidence of local recurrence or metastatic disease.  Stable small peritoneal nodule in the right upper quadrant, possibly fat necrosis as depicted on prior exams.  Nonetheless, attention on follow-up is recommended.  Stable wall thickening involving the hepatic duct, likely reactive in nature in the setting of complex surgery.  Persistent heterogeneous enhancement of the liver, nonspecific and could represent drug toxicity.  Mild generalized anasarca, which is slightly more prominent compared to prior exam.     PET-CT (9/2/2020): " IMPRESSION: In this patient with a history of gastric adenocarcinoma status post Whipple procedure and chemotherapy:  1. Postsurgical changes of Whipple procedure.  No abnormal hypermetabolism or focal masslike lesion at the surgical site, remaining pancreas, or stomach to suggest local recurrence.  2. Unchanged small peritoneal nodule in the right upper quadrant, similar to 8/21/2020, and progressively smaller and less metabolic since 3/13/2020.  Appearance on older studies suggested this was inflammatory such as fat necrosis, with remaining tissue suggesting scar.  Metastasis felt to be less likely.  Recommend continued attention on follow-up.  3. Unchanged wall thickening and enhancement of the common bile duct, nonspecific, may be reactive to Bilroth II surgical changes.  Recurrent cholangitis is a consideration in the correct clinical context.   4. Heterogenous enhancement of the liver suggesting hepatitis, perhaps related to recent (or recurrent) cholangitis or drug toxicity.  5. Findings of third-spacing, including new trace left pleural effusion, mild edema in the upper abdomen and paracolic gutters, and small volume free fluid in the pelvis.   6. Resolved right colitis.     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  Final pathology showed benign changes in the pancreas and also an incidental, T4, diffuse-type signet ring gastric cancer with a submucosal growth pattern.  The microscopic margin on the stomach was positive.  She has subsequently undergone a course of neoadjuvant chemotherapy, which she has had struggles with, and now presents for recommendations on management moving forward.  At the last visit, she was deconditioned and with relative malnutrition.  She has improved significantly since last visit.  She is eating well, is out of a wheelchair, has gained weight, and has increased pre-albumin but stable albumin.   Restaging from yesterday shows no clear evidence of metastatic disease.  I had a long, repeat discussion of gastric cancer and it's natural history in detail.  The type of cancer she has is aggressive and carries high risk for recurrence and distant spread.  In addition, it tends to involve most, or all of the stomach.  She has completed neoadjuvant therapy without clear evidence of disease progression or presence of metastatic disease, so this bodes positively for her.  Her main issue right now is that she needs to boost her nutrition slightly more.  I discussed that I we would regret not improving her nutrition if we have a complication related to healing.  I also believe she will be able to do it if we postpone by another couple of weeks.  She was in agreement with this.  I discussed that I also want to have her CBC drawn this upcoming Monday to see if she is still dropping.  If she is, she may need evaluation for occult GI bleed prior to her surgery.  She may need a transfusion prior to surgery anyways to avoid issues with very low hemoglobin post-operatively.  We will work on all of this scheduling and she will see PACS today.  I did discuss the planned surgery today with her in detail.  As stated, the entire stomach may be involved with cancer, but given the type and pattern of disease in her stomach, the only way to determine this will be with pathologic evaluation.  In addition, she still has risk for peritoneal recurrence.  Given this, I would plan to proceed first with a diagnostic laparoscopy and peritoneal washings and biopsies as necessary, followed by attempted curative intent resection approximately a week later if the laparoscopy showed no evidence of metastatic disease.  I discussed that her definitive resection would involve at least an open sub-total gastrectomy, lymphadenectomy, grant-en-y gastrojejunostomy (given prior Whipple w/ Billroth II reconstruction), and likely placement of feeding  jejunostomy.  I would have the margin examined during surgery under frozen section.  If the margin were negative, then we would remove no further stomach.  If the margin were positive, then we would likely have to proceed with total gastrectomy, lymphadenectomy, grant-en-y esophagojejunostomy, and feeding jejunostomy.  The benefit to this surgery would be for potential prolongation of life and/or cure.  I also discussed risks including but not limited to death, bleeding, infection, COVID-19, pneumonia, cardiac events, renal failure, stroke, DVT/PE, UTI, anastomotic leaks, damage to surrounding structures, bowel obstruction, prolonged ileus, malnutrition w/ failure to thrive, need for prolonged enteral or parenteral nutrition, need for further procedures due to complications, chyle/lymph leak, marginal ulceration, internal hernias, ventral hernias, dumping syndrome, afferent/efferent limb syndrome, and chronic changes in the way she needs to eat.  I also discussed the risks of laparoscopy, which are the same as above minus the complications directly related to the gastrectomy and gastrointestinal reconstruction.  All questions were answered and the patient was in agreement with and understanding of the plan.     Total time spent with the patient was 45 minutes, of which more than half was counseling and coordination of care.    Again, thank you for allowing me to participate in the care of your patient.        Sincerely,        Yandel Mallory MD

## 2020-09-28 ENCOUNTER — TELEPHONE (OUTPATIENT)
Dept: SURGERY | Facility: CLINIC | Age: 55
End: 2020-09-28

## 2020-09-28 DIAGNOSIS — Z11.59 ENCOUNTER FOR SCREENING FOR OTHER VIRAL DISEASES: Primary | ICD-10-CM

## 2020-09-28 NOTE — TELEPHONE ENCOUNTER
Surgeries rescheduled to 10/13 and 10/22 at [place] with Dr. Mallory.     COVID-19 tests scheduled for 10/10 and 10/19 [place] [facility] [place]    Post-op rescheduled to 11/6.    No further questions.

## 2020-09-29 ENCOUNTER — ANESTHESIA (OUTPATIENT)
Dept: SURGERY | Facility: CLINIC | Age: 55
End: 2020-09-29
Payer: MEDICARE

## 2020-09-29 NOTE — H&P
Pre-Operative H & P     CC:  Preoperative exam to assess for increased cardiopulmonary risk while undergoing surgery and anesthesia.    Date of Encounter: 9/29/2020  Primary Care Physician:  Marianne Gonzalez  Associated diagnosis: malignant neoplasm of overlapping sites of stomach    HPI  Nathalie Bashir is a 54 year old female who presents for pre-operative H & P in preparation for a Diagnostic laparoscopy with biopsies and peritoneal washings on 10/13/20 and an Open subtotal gastectomy, possible total gastectomy, feeding jejunostomy insertion on 10/22/20 by Dr. Mallory  at [facility].     Nathalie Bashir is a 54 year old female with allergic rhinitis, anemia, neuropathy, migraines, history of IPMN, chronic neck pain, chronic back pain, crohn's disease, GERD and depression that has a malignant neoplasm of overlapping sites of stomach.  She underwent a whipple procedure on 1/28/20 for an IPMN.  The final pathology of that was benign, but another specimen came back with an incidental, T4, diffuse type signet gastric cancer with a microscopically positive margin.  She has since had chemotherapy with her last being on 8/20/20.  PET scan has showed no clear evidence of disease progression or obvious metastatic disease.  She has consulted with Dr. Mallory and now the above listed surgery has been recommended for further treatment.         History is obtained from the patient and the medical record.     Past Medical History  Past Medical History:   Diagnosis Date     Allergic rhinitis      Cancer (H)     stomach cancer     Depression      Lumbago      Migraine      Other chronic pain     low back     Sacroiliitis (H)     low back pain     Trochanteric bursitis     leg length discrrepancy, hip pain       Past Surgical History  Past Surgical History:   Procedure Laterality Date     ARTHROPLASTY HIP Left 2016     BACK SURGERY      L4-5 decompression     C REPAIR DETACH RETINA,SCLERAL  BUCKLE       CATARACT IOL, RT/LT       CHOLECYSTECTOMY N/A 1/28/2020    Procedure: Cholecystectomy;  Surgeon: Yandel Mallory MD;  Location: [facility] OR     ENDOSCOPIC RETROGRADE CHOLANGIOPANCREATOGRAM N/A 7/27/2020    Procedure: ENDOSCOPIC RETROGRADE CHOLANGIOPANCREATOGRAPHY  **Latex Allergy** with jejunal biopsies;  Surgeon: Jeet Aviles MD;  Location: [facility] OR     ESOPHAGOSCOPY, GASTROSCOPY, DUODENOSCOPY (EGD), COMBINED N/A 3/3/2020    Procedure: ESOPHAGOGASTRODUODENOSCOPY, WITH ENDOSCOPIC US (enoxaparin);  Surgeon: Bakari Plata MD;  Location: [facility] GI     EYE SURGERY       IR CHEST PORT PLACEMENT > 5 YRS OF AGE  3/26/2020     OPEN REDUCTION INTERNAL FIXATION RODDING INTRAMEDULLARY FEMUR Left 12/23/2014    Procedure: OPEN REDUCTION INTERNAL FIXATION RODDING INTRAMEDULLARY FEMUR;  Surgeon: Arnol Santiago MD;  Location: [facility]     ORTHOPEDIC SURGERY       PICC DOUBLE LUMEN PLACEMENT Right 02/07/2020    5 fr double lumen 41 cm     REMOVE HARDWARE LOWER EXTREMITY Left 4/7/2015    Procedure: REMOVE HARDWARE LOWER EXTREMITY;  Surgeon: Arnol Santiago MD;  Location: [facility]     REMOVE HARDWARE RODDING INTRAMEDULLARY FEMUR Left 12/17/2015    Procedure: REMOVE HARDWARE RODDING INTRAMEDULLARY FEMUR;  Surgeon: Arnol Santiago MD;  [facility]     RESECT BONE LOWER EXTREMITY Left 12/23/2014    Procedure: RESECT BONE LOWER EXTREMITY;  Surgeon: Arnol Santiago MD;  [facility]     WHIPPLE PROCEDURE N/A 1/28/2020    Procedure: Open Whipple procedure and Cholecystectomy;  Surgeon: Yandel Mallory MD;  Location: [facility] OR       Hx of Blood transfusions/reactions: none     Hx of abnormal bleeding or anti-platelet use: none    Menstrual history: Patient's last menstrual period was 09/23/2014.:     Steroid use in the last year: decadron with chemo - last dose around 8/20/20.  No long term steroids.    Personal or FH with difficulty with Anesthesia:  none    Prior to Admission Medications  Current Outpatient  Medications   Medication Sig Dispense Refill     amitriptyline (ELAVIL) 25 MG tablet Take 75 mg by mouth At Bedtime        citalopram (CELEXA) 20 MG tablet Take 20 mg by mouth every morning        famotidine (PEPCID) 20 MG tablet Take 1 tablet (20 mg) by mouth 2 times daily 60 tablet 3     hydrOXYzine (ATARAX) 25 MG tablet Take 25 mg by mouth 3 times daily as needed for itching (allergies)       loperamide (IMODIUM) 2 MG capsule Take 4 mg by mouth 4 times daily as needed for diarrhea        ondansetron (ZOFRAN) 8 MG tablet Take 1 tablet (8 mg) by mouth every 8 hours as needed (Nausea/Vomiting) 90 tablet 3     oxyCODONE (ROXICODONE) 5 MG tablet Take 1-2 tablets (5-10 mg) by mouth 2 times daily as needed for severe pain 60 tablet 0     pantoprazole (PROTONIX) 40 MG EC tablet TAKE ONE TABLET BY MOUTH TWICE A DAY 60 tablet 1     potassium chloride ER (KLOR-CON M) 20 MEQ CR tablet TAKE ONE TABLET BY MOUTH ONCE DAILY 30 tablet 1     simethicone 80 MG TABS Take 1 tablet by mouth every 6 hours as needed (bloating, gas, belching) 90 tablet 3     SUMAtriptan (IMITREX) 100 MG tablet Take 100 mg by mouth at onset of headache for migraine         Allergies  Allergies   Allergen Reactions     Gluten Meal Palpitations     Augmentin Rash     Patient tolerated Zosyn in 7/2020     Oxycontin [Oxycodone]      Confusion, loopy, oxycodone is tolerated     Pregabalin      interfered with Cymbalta     Topiramate      Tongue numbness, taste alteration, irritable      Ketorolac Headache     Acetaminophen Nausea     Urine retention       Dust Mite Extract      Gabapentin Dizziness and GI Disturbance     Latex Rash     Methadone Fatigue     Mold      Naprosyn [Naproxen] Dizziness and GI Disturbance     Seasonal Allergies        Social History  Social History     Socioeconomic History     Marital status:      Spouse name: Not on file     Number of children: 1     Years of education: Not on file     Highest education level: Not on file    Occupational History     Occupation: retired   Social Needs     Financial resource strain: Not on file     Food insecurity     Worry: Not on file     Inability: Not on file     Transportation needs     Medical: Not on file     Non-medical: Not on file   Tobacco Use     Smoking status: Former Smoker     Packs/day: 0.50     Years: 25.00     Pack years: 12.50     Types: Cigarettes     Last attempt to quit: 2020     Years since quittin.7     Smokeless tobacco: Never Used   Substance and Sexual Activity     Alcohol use: No     Drug use: No     Sexual activity: Not on file   Lifestyle     Physical activity     Days per week: Not on file     Minutes per session: Not on file     Stress: Not on file   Relationships     Social connections     Talks on phone: Not on file     Gets together: Not on file     Attends Yarsanism service: Not on file     Active member of club or organization: Not on file     Attends meetings of clubs or organizations: Not on file     Relationship status: Not on file     Intimate partner violence     Fear of current or ex partner: Not on file     Emotionally abused: Not on file     Physically abused: Not on file     Forced sexual activity: Not on file   Other Topics Concern     Parent/sibling w/ CABG, MI or angioplasty before 65F 55M? Not Asked   Social History Narrative     Not on file       Family History  Family History   Problem Relation Age of Onset     Heart Failure Mother 77     Dementia Father         frontal lobe     No Known Problems Sister      No Known Problems Brother      Anesthesia Reaction No family hx of      Deep Vein Thrombosis No family hx of              ROS/MED HX  The complete review of systems is negative other than noted in the HPI or here.   ENT/Pulmonary:     (+)LISHA risk factors snores loudly, allergic rhinitis, tobacco use, Past use , . .   (-) recent URI   Neurologic:     (+)neuropathy - hands and feet, migraines,     Cardiovascular:  - neg cardiovascular ROS  "    METS/Exercise Tolerance:  3 - Able to walk 1-2 blocks without stopping   Hematologic:     (+) Anemia, -     (-) History of Transfusion   Musculoskeletal:   (+)  other musculoskeletal- chronic neck and back pain      GI/Hepatic:     (+) GERD Asymptomatic on medication, Other GI/Hepatic s/p whipple for IPMN, crohn's disease      Renal/Genitourinary:  - ROS Renal section negative       Endo:  - neg endo ROS       Psychiatric:     (+) psychiatric history depression      Infectious Disease:  - neg infectious disease ROS      (-) Recent Fever   Malignancy:   (+) Malignancy History of GI  GI CA  Active status post Chemo,         Other:    (+) No chance of pregnancy H/O Chronic Pain,H/O chronic opiod use ,                                    172 lbs 0 oz  5' 8.5\"[pt reported[   Body mass index is 25.77 kg/m .    /67 117/72 108/56   Temp 98.6  F (37  C) -- 98  F (36.7  C)   Temp src Oral -- Oral   Pulse 94 120 101   Resp 20 -- 16   SpO2 100 % 97 % 100 %   Height 1.74 m (5' 8.5\") [pt reported] 1.753 m (5' 9\") 1.753 m (5' 9\")   Weight 78 kg (172 lb            Physical Exam  Constitutional: Awake, alert, cooperative, no apparent distress, and appears stated age.  Eyes: Pupils equal, round and reactive to light, extra ocular muscles intact, sclera clear, conjunctiva normal.  HENT: Normocephalic, oral pharynx with moist mucus membranes. No goiter appreciated.   Respiratory: Clear to auscultation bilaterally, no crackles or wheezing.  Cardiovascular: Regular rate and rhythm, normal S1 and S2, and no murmur noted.  Carotids +2, no bruits. No edema. Palpable pulses to radial  DP and PT arteries.   GI: Normal bowel sounds, soft, non-distended, non-tender, no masses palpated, no hepatosplenomegaly.    Lymph/Hematologic: No cervical lymphadenopathy and no supraclavicular lymphadenopathy.  Genitourinary:  deferred  Skin: Warm and dry.  No rashes at anticipated surgical site.   Musculoskeletal: Full ROM of neck. There is no " redness, warmth, or swelling of the exposed joints. Gross motor strength is normal.    Neurologic: Awake, alert, oriented to name, place and time. Cranial nerves II-XII are grossly intact. Gait is normal.   Neuropsychiatric: Calm, cooperative. Normal affect.     Labs: (personally reviewed)  Component      Latest Ref Rng & Units 2020   WBC      4.0 - 11.0 10e9/L 4.5   RBC Count      3.8 - 5.2 10e12/L 2.71 (L)   Hemoglobin      11.7 - 15.7 g/dL 7.3 (L)   Hematocrit      35.0 - 47.0 % 24.6 (L)   MCV      78 - 100 fl 91   MCH      26.5 - 33.0 pg 26.9   MCHC      31.5 - 36.5 g/dL 29.7 (L)   RDW      10.0 - 15.0 % 18.1 (H)   Platelet Count      150 - 450 10e9/L 263   Diff Method       Automated Method   % Neutrophils      % 51.7   % Lymphocytes      % 30.6   % Monocytes      % 10.3   % Eosinophils      % 6.3   % Basophils      % 0.9   % Immature Granulocytes      % 0.2   Nucleated RBCs      0 /100 0   Absolute Neutrophil      1.6 - 8.3 10e9/L 2.3   Absolute Lymphocytes      0.8 - 5.3 10e9/L 1.4   Absolute Monocytes      0.0 - 1.3 10e9/L 0.5   Absolute Eosinophils      0.0 - 0.7 10e9/L 0.3   Absolute Basophils      0.0 - 0.2 10e9/L 0.0   Abs Immature Granulocytes      0 - 0.4 10e9/L 0.0   Absolute Nucleated RBC       0.0   Sodium      133 - 144 mmol/L 137   Potassium      3.4 - 5.3 mmol/L 4.1   Chloride      94 - 109 mmol/L 104   Carbon Dioxide      20 - 32 mmol/L 27   Anion Gap      3 - 14 mmol/L 7   Glucose      70 - 99 mg/dL 87   Urea Nitrogen      7 - 30 mg/dL 16   Creatinine      0.52 - 1.04 mg/dL 1.02   GFR Estimate      >60 mL/min/1.73:m2 62   GFR Estimate If Black      >60 mL/min/1.73:m2 72   Calcium      8.5 - 10.1 mg/dL 8.3 (L)   Bilirubin Total      0.2 - 1.3 mg/dL 0.5   Albumin      3.4 - 5.0 g/dL 2.5 (L)   Protein Total      6.8 - 8.8 g/dL 6.0 (L)   Alkaline Phosphatase      40 - 150 U/L 255 (H)   ALT      0 - 50 U/L 57 (H)   AST      0 - 45 U/L 77 (H)         EK20  SR with short GA  syndrome  Cardiac echo: 7/20/20  Interpretation Summary  Global and regional left ventricular function is normal with an EF of 60-65%.  Right ventricular function, chamber size, wall motion, and thickness are  normal.  Pulmonary artery systolic pressure cannot be assessed.  The inferior vena cava is normal.  No pericardial effusion is present.  Previous study not available for comparison.            Outside records reviewed from: Care Everywhere      ASSESSMENT and PLAN  Nathalie Bashir is a 54 year old female scheduled for a Diagnostic laparoscopy with biopsies and peritoneal washings on 10/13/20 and an Open subtotal gastectomy, possible total gastectomy, feeding jejunostomy insertion on 10/22/20 by Dr. Mallory in treatment of malignant neoplasm of overlapping sites of stomach.  PAC referral for risk assessment and optimization for anesthesia with comorbid conditions of: allergic rhinitis, anemia, neuropathy, migraines, history of IPMN, chronic neck pain, chronic back pain, crohn's disease, GERD and depression.     Pre-operative considerations:  1.  Cardiac:  Functional status- METS 3.   Due to her chemo related neuropathy, her exercise tolerance is very limited.  She can only walk a block at a time.  She has no known cardiac conditions. She had an echocardiogram on 7/20/20 that showed an EF of 60-65% and no wall motion abnormalities.  High risk surgery with 0.9%risk of major adverse cardiac event.   2.  Pulm:  Airway feasible.  LISHA risk: low.  She quit smoking in January 2020.    3.  GI:  Risk of PONV score = 3.  If > 2, anti-emetic intervention recommended.  GERD is well controlled with protonix.  4. Heme: She has chronic anemia.  Her recent hemoglobin though was quite low at 7.3.  She discussed this already with Dr. Mallory and postponed both of the above surgeries (previously 9/25/ and 10/8) to get the anemia managed better.  She said they discussed doing a blood transfusion.  Please see future notes for treatment  that occurred.  Will order a repeat T&S for DOS.  5. Musculoskeletal: She has chronic neck and back pain.  She typically takes a total of 20mg of oxycodone per day.  Morphine equivalent dosing = 30mg.  Consider cautious positioning.    6. Neuro: Hold imitrex 24 hours prior to surgery.  She has significant peripheral neuropathy s/p chemo.  Consider fall risk precautions.  7. Access:  Right chest portacath.  8. Endo:  S/p whipple 1/2020 for IPMN.      VTE risk: 1.8%      **Addendum (10/9/20):  Saw repeat hgb as ordered by Dr. Mallory from 10/2/20 was still low at 7.6.  Contacted Dr. Mallory on 10/6/20 to discuss pre-op plan.  He noted plan to proceed with Procrit and a possible transfusion.  Per chart review, she has since also followed up with Dr. Pollard who also ordered Infed and further labs.  Kenzie Ace RN is working with patient on plan and scheduling.  Dr. Mallory and Atul to follow prior to surgery.**      Patient is optimized and is acceptable candidate for the proposed procedure.  No further diagnostic evaluation is needed.           Yudy Ace DNP, RN, APRN  Preoperative Assessment Center  [facility]  Clinic and [facility] Center  Phone: [phone]  Fax: [phone]

## 2020-10-01 DIAGNOSIS — C16.3 MALIGNANT NEOPLASM OF PYLORIC ANTRUM (H): ICD-10-CM

## 2020-10-01 DIAGNOSIS — D49.0 IPMN (INTRADUCTAL PAPILLARY MUCINOUS NEOPLASM): Primary | ICD-10-CM

## 2020-10-02 DIAGNOSIS — C16.9 MALIGNANT NEOPLASM OF STOMACH, UNSPECIFIED LOCATION (H): ICD-10-CM

## 2020-10-02 DIAGNOSIS — C16.3 MALIGNANT NEOPLASM OF PYLORIC ANTRUM (H): ICD-10-CM

## 2020-10-02 DIAGNOSIS — Z01.818 PREOP EXAMINATION: ICD-10-CM

## 2020-10-02 LAB
ABO + RH BLD: NORMAL
ABO + RH BLD: NORMAL
ALBUMIN SERPL-MCNC: 2.5 G/DL (ref 3.4–5)
ALP SERPL-CCNC: 248 U/L (ref 40–150)
ALT SERPL W P-5'-P-CCNC: 43 U/L (ref 0–50)
ANION GAP SERPL CALCULATED.3IONS-SCNC: 6 MMOL/L (ref 3–14)
AST SERPL W P-5'-P-CCNC: 45 U/L (ref 0–45)
BASOPHILS # BLD AUTO: 0 10E9/L (ref 0–0.2)
BASOPHILS NFR BLD AUTO: 0.9 %
BILIRUB SERPL-MCNC: 0.3 MG/DL (ref 0.2–1.3)
BLD GP AB SCN SERPL QL: NORMAL
BLOOD BANK CMNT PATIENT-IMP: NORMAL
BUN SERPL-MCNC: 12 MG/DL (ref 7–30)
CALCIUM SERPL-MCNC: 8.3 MG/DL (ref 8.5–10.1)
CHLORIDE SERPL-SCNC: 107 MMOL/L (ref 94–109)
CO2 SERPL-SCNC: 26 MMOL/L (ref 20–32)
CREAT SERPL-MCNC: 0.95 MG/DL (ref 0.52–1.04)
DIFFERENTIAL METHOD BLD: ABNORMAL
EOSINOPHIL # BLD AUTO: 0.2 10E9/L (ref 0–0.7)
EOSINOPHIL NFR BLD AUTO: 4.6 %
ERYTHROCYTE [DISTWIDTH] IN BLOOD BY AUTOMATED COUNT: 17.2 % (ref 10–15)
GFR SERPL CREATININE-BSD FRML MDRD: 68 ML/MIN/{1.73_M2}
GLUCOSE SERPL-MCNC: 107 MG/DL (ref 70–99)
HCT VFR BLD AUTO: 25.8 % (ref 35–47)
HGB BLD-MCNC: 7.6 G/DL (ref 11.7–15.7)
IMM GRANULOCYTES # BLD: 0 10E9/L (ref 0–0.4)
IMM GRANULOCYTES NFR BLD: 0.2 %
LYMPHOCYTES # BLD AUTO: 1.4 10E9/L (ref 0.8–5.3)
LYMPHOCYTES NFR BLD AUTO: 30.5 %
MCH RBC QN AUTO: 26.8 PG (ref 26.5–33)
MCHC RBC AUTO-ENTMCNC: 29.5 G/DL (ref 31.5–36.5)
MCV RBC AUTO: 91 FL (ref 78–100)
MONOCYTES # BLD AUTO: 0.4 10E9/L (ref 0–1.3)
MONOCYTES NFR BLD AUTO: 9.3 %
NEUTROPHILS # BLD AUTO: 2.5 10E9/L (ref 1.6–8.3)
NEUTROPHILS NFR BLD AUTO: 54.5 %
NRBC # BLD AUTO: 0 10*3/UL
NRBC BLD AUTO-RTO: 0 /100
PLATELET # BLD AUTO: 247 10E9/L (ref 150–450)
POTASSIUM SERPL-SCNC: 4.2 MMOL/L (ref 3.4–5.3)
PREALB SERPL IA-MCNC: 15 MG/DL (ref 15–45)
PROT SERPL-MCNC: 6.2 G/DL (ref 6.8–8.8)
RBC # BLD AUTO: 2.84 10E12/L (ref 3.8–5.2)
SODIUM SERPL-SCNC: 139 MMOL/L (ref 133–144)
SPECIMEN EXP DATE BLD: NORMAL
WBC # BLD AUTO: 4.5 10E9/L (ref 4–11)

## 2020-10-02 PROCEDURE — 84134 ASSAY OF PREALBUMIN: CPT | Performed by: PATHOLOGY

## 2020-10-02 PROCEDURE — 85025 COMPLETE CBC W/AUTO DIFF WBC: CPT | Performed by: PATHOLOGY

## 2020-10-02 PROCEDURE — 36415 COLL VENOUS BLD VENIPUNCTURE: CPT | Performed by: PATHOLOGY

## 2020-10-02 PROCEDURE — 80053 COMPREHEN METABOLIC PANEL: CPT | Performed by: PATHOLOGY

## 2020-10-05 ENCOUNTER — PATIENT OUTREACH (OUTPATIENT)
Dept: PALLIATIVE CARE | Facility: CLINIC | Age: 55
End: 2020-10-05

## 2020-10-05 DIAGNOSIS — C16.9 GASTRIC ADENOCARCINOMA (H): ICD-10-CM

## 2020-10-05 RX ORDER — OXYCODONE HYDROCHLORIDE 5 MG/1
5-10 TABLET ORAL 2 TIMES DAILY PRN
Qty: 60 TABLET | Refills: 0 | Status: SHIPPED | OUTPATIENT
Start: 2020-10-05 | End: 2020-10-19

## 2020-10-05 NOTE — PROGRESS NOTES
Nathalie calls today for a refill of oxycodone that she uses for neuropathy pain.   Her upcoming procedures have been delayed due to low hemoglobin.    Controlled Medication Requested:  Oxycodone  Last date written and prescriber:  09/21/2020 for #60 tabs   (15 days)   Last date dispensed per  website: 09/21/2020  Last office visit: 08/18/2020  Next office visit:  10/22/2020      Recent Documentation from [place]:  Reviewed, no concerns

## 2020-10-06 ENCOUNTER — PATIENT OUTREACH (OUTPATIENT)
Dept: SURGERY | Facility: CLINIC | Age: 55
End: 2020-10-06

## 2020-10-06 ENCOUNTER — TELEPHONE (OUTPATIENT)
Dept: ONCOLOGY | Facility: CLINIC | Age: 55
End: 2020-10-06

## 2020-10-06 NOTE — TELEPHONE ENCOUNTER
PA Initiation    Medication: PROCRIT 4000UNIT/ML solution - PA SUBMITTED - (Key: [id number]) - [id number]  Insurance Company: [employer] - Phone [phone] Fax [phone]  Start Date: 10/6/2020    [facility]   Oncology Pharmacy Liaison  [email or web address]   [phone] (phone)   [phone] (fax)

## 2020-10-07 ENCOUNTER — TELEPHONE (OUTPATIENT)
Dept: SURGERY | Facility: CLINIC | Age: 55
End: 2020-10-07

## 2020-10-07 DIAGNOSIS — Z11.59 ENCOUNTER FOR SCREENING FOR OTHER VIRAL DISEASES: Primary | ICD-10-CM

## 2020-10-07 NOTE — TELEPHONE ENCOUNTER
Rescheduled 2nd procedure with Dr. Mallory to 10/21.    Confirmed with Nathalie.    No further questions.

## 2020-10-07 NOTE — TELEPHONE ENCOUNTER
Medication Appeal Initiation    We have initiated an appeal for the requested medication:  Medication: PROCRIT 4000UNIT/ML solution - APPEAL SUBMITTED - CASE # [id number]  Appeal Start Date:  10/7/2020  Insurance Company: [employer] - Phone [phone] Fax [phone]    This writer submitted urgent appeal request verbally. [employer] rep stated that determination would be made within 72 hours. Case # [id number].    [facility]   Oncology Pharmacy Liaison  [email or web address]   [phone] (phone)   [phone] (fax)

## 2020-10-07 NOTE — TELEPHONE ENCOUNTER
PRIOR AUTHORIZATION DENIED    Medication: PROCRIT 4000UNIT/ML solution - PA DENIED - (Key: [id number]) - [id number]    Denial Date: 10/6/2020    Denial Rational:  Hgb not greater than 10 grams per deciliter and less than 13 grams per deciliter.     Appeal Information: Please provide rationale for use of Procrit for appeal purposes if Procrit is still intended to be used.     [facility]   Oncology Pharmacy Liaison  [email or web address]   [phone] (phone)   [phone] (fax)

## 2020-10-07 NOTE — TELEPHONE ENCOUNTER
Surgical Oncology RN Care Coordination Note:     Called and updated patient regarding plan for anemia. Informed her we are working on getting the injection approved and that if we are not able to get this approved we may need to arrange for pre op transfusion. Informed her that I will keep her informed and that we will contact her once plan is finalized.     Kenzie Ace RN, BSN  Care Coordinator   [phone]

## 2020-10-07 NOTE — TELEPHONE ENCOUNTER
This patient has a Hgb of 7.6 on last check that is likely the result of a cancer diagnosis plus recent cytotoxic chemotherapy.  She is set to undergo open total gastrectomy in 2 weeks, and this Hgb level is not ideal for a large abdominal surgery where blood loss requiring transfusion is a definite possibility.  As such, I would like to attempt to increase her Hgb over the next couple of weeks with the use of PROCRIT.

## 2020-10-08 ENCOUNTER — VIRTUAL VISIT (OUTPATIENT)
Dept: ONCOLOGY | Facility: CLINIC | Age: 55
DRG: 327 | End: 2020-10-08
Attending: INTERNAL MEDICINE
Payer: MEDICARE

## 2020-10-08 DIAGNOSIS — D50.9 IRON DEFICIENCY ANEMIA, UNSPECIFIED IRON DEFICIENCY ANEMIA TYPE: ICD-10-CM

## 2020-10-08 DIAGNOSIS — D64.9 ANEMIA, UNSPECIFIED TYPE: Primary | ICD-10-CM

## 2020-10-08 PROCEDURE — 99214 OFFICE O/P EST MOD 30 MIN: CPT | Mod: 95 | Performed by: INTERNAL MEDICINE

## 2020-10-08 RX ORDER — HEPARIN SODIUM (PORCINE) LOCK FLUSH IV SOLN 100 UNIT/ML 100 UNIT/ML
5 SOLUTION INTRAVENOUS
Status: CANCELLED | OUTPATIENT
Start: 2020-10-09

## 2020-10-08 RX ORDER — HEPARIN SODIUM,PORCINE 10 UNIT/ML
5 VIAL (ML) INTRAVENOUS
Status: CANCELLED | OUTPATIENT
Start: 2020-10-09

## 2020-10-08 NOTE — PROGRESS NOTES
Oncology/Hematology Visit Note  Oct 8, 2020    Reason for Visit: Follow up of gastric adenocarcinoma.    History of Present Illness:     Please see previous notes for detail. I have copied and updated from prior notes.    Ms. Bashir is a 54 year old female with a history of gastric adenocarcinoma.  She was being followed for a pancreatic duct dilatation and pancreatic cyst which was noted in November 2018.  Since May 2019 she started noticing some nausea and vomiting and occasional abdominal discomfort.  She had an ultrasound in August 2019 which was essentially unremarkable.  She had a repeat endoscopic ultrasound on 10/29/2019 which showed increase in size of the cyst as well as dilation of the main pancreatic duct.  FNA and fluid analysis showed mucinous epithelium.  She was referred to Dr. 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  Surprisingly there was no pancreatic ductal neoplasm seen but on the resected specimen stomach cancer was noted.  It was poorly differentiated adenocarcinoma with poorly cohesive/signet ring cell type with proximal gastric mucosal and serosal margins being positive.  There was lymphovascular and perineural invasion seen.  22 lymph nodes were sampled and all were benign.  It was a pT4aN0 lesion.  HER-2/christine FISH was not amplified.         EUS on 3/3/2020 without clear evidence of neoplasm endoscopically. Left gastric lymph node fine-needle aspiration was negative for carcinoma. Biopsy from the gastric jejunal anastomosis as well as lesser curvature of the stomach also did not show any malignancy.     She had a PET/CT on 3/13/2020 which showed soft tissue streaky density with increased FDG uptake adjacent to the pancreaticojejunostomy which could be neoplastic in origin.  There is mild increased FDG uptake in the gastric remnant.  Focal area of soft tissue thickening with fatty streakiness along medial laparotomy with increased FDG uptake which  likely is inflammatory. Increased FDG uptake in thoracic esophagus which could be esophagitis.      She started on chemotherapy with 5FU, oxaliplatin, and Taxotere on 3/27/20. She was seen on 4/7/20 for evaluation of fevers. She was treated for possible pneumonia with Levaquin.      She was seen in clinic 4/13/20 for RUQ pain,CT and labs reassuring. She received cycles 2 and 3 of chemotherapy on 4/20/20 and 5/4/20. She was admitted from 5/21-5/23/20 with neutropenic aspiration pneumonia. She received cycle 4 on 5/27/20 with Neulasta support. PET/CT on 6/3/20 showed mild residual uptake at the site of gastrojejunal anastomosis which likely is physiologic.  There is almost resolution of soft tissue nodule next to the pancreaticoduodenal anastomosis without FDG uptake.  There is focal increased uptake in the left posterior acetabulum which could be artifact.     Decreased dose of oxaliplatin due to neuropathy/cold sensitivity on 6/11. Decreased 5FU to 85% with cycle #6 due to mucositis/diarrhea/taste changes. Treatment was held 7/9/20 due to diarrhea, dehydration, tachycardia, rash, and weight loss and she got cycle 7 on 7/15. On 7/20 she presented to the ED with RUQ abdominal pain, and was admitted 7/20-7/29 for acute cholangitis     She received C#8 FLOT on 8/14/2020.    PET/CT on 9/2/2020 did not show evidence of disease.  Small peritoneal nodule was a stable.  This was most consistent with fat necrosis.    CT chest abdomen and pelvis on 9/23/2020 also does not show evidence of malignancy recurrence and the peritoneal nodule in the right upper quadrant is stable.    She saw Dr. Mallory on 9/25/2020 who recommended proceeding with surgery but recently her hemoglobin was 7.6 so he was planning to give her Procrit and all blood transfusion before the proposed surgery on 10/21/2020.      Interval History:  -this is a video visit. She feels better. Appetite is better. No nausea or vomiting now. Bowels are working well. No  melena or hematochezia. Energy is improving. She is trying to walk regularly.  She has neuropathy in hands/feet. Feels tingling/numbness and occasional pain in the feet. It is better now as opposed to when she was on chemo.  No more infections. No dyspnea. No new swellings. Right upper abdomen/rib cage area pain is better and she is taking oxycodone 5mg on average 4 times a day.  Denies pica symptoms. Denies restless legs. Has gained some weight recently.    ECOG 1    ROS:  A comprehensive ROS was otherwise neg        Current Outpatient Medications   Medication Sig Dispense Refill     amitriptyline (ELAVIL) 25 MG tablet Take 75 mg by mouth At Bedtime        citalopram (CELEXA) 20 MG tablet Take 20 mg by mouth every morning        famotidine (PEPCID) 20 MG tablet Take 1 tablet (20 mg) by mouth 2 times daily 60 tablet 3     hydrOXYzine (ATARAX) 25 MG tablet Take 25 mg by mouth 3 times daily as needed for itching (allergies)       loperamide (IMODIUM) 2 MG capsule Take 4 mg by mouth 4 times daily as needed for diarrhea        ondansetron (ZOFRAN) 8 MG tablet Take 1 tablet (8 mg) by mouth every 8 hours as needed (Nausea/Vomiting) 90 tablet 3     oxyCODONE (ROXICODONE) 5 MG tablet Take 1-2 tablets (5-10 mg) by mouth 2 times daily as needed for severe pain 60 tablet 0     pantoprazole (PROTONIX) 40 MG EC tablet TAKE ONE TABLET BY MOUTH TWICE A DAY 60 tablet 1     potassium chloride ER (KLOR-CON M) 20 MEQ CR tablet TAKE ONE TABLET BY MOUTH ONCE DAILY 30 tablet 1     simethicone 80 MG TABS Take 1 tablet by mouth every 6 hours as needed (bloating, gas, belching) 90 tablet 3     SUMAtriptan (IMITREX) 100 MG tablet Take 100 mg by mouth at onset of headache for migraine         Physical Examination:    LMP 09/23/2014   Wt Readings from Last 10 Encounters:   09/25/20 78 kg (172 lb)   09/25/20 77.8 kg (171 lb 9.6 oz)   09/04/20 76.1 kg (167 lb 11.2 oz)   08/21/20 75.9 kg (167 lb 6.4 oz)   08/14/20 82.6 kg (182 lb)   08/06/20 87.2  kg (192 lb 3.2 oz)   07/29/20 89.2 kg (196 lb 11.2 oz)   07/15/20 79.4 kg (175 lb)   07/08/20 75.7 kg (166 lb 14.4 oz)   07/08/20 75.7 kg (166 lb 14.4 oz)         Constitutional.  Does not seem to be in any acute distress.  Eyes.  No redness or discharge noted.  Respiratory.  Speaking in full sentences.  Breathing seems comfortable without any accessory use of muscles.    Skin.  Visualized his skin does not show any obvious rashes.  Musculoskeletal.  Range of motion for visualized areas is intact.  Neurological.  Alert and oriented x3.  Psychiatric.  Mood, mentation and affect are normal.  Decision making capacity is intact.      The rest of a comprehensive physical examination is deferred due to Public Health Emergency video visit restrictions.      Laboratory Data:     Reviewed      Assessment and Plan:    Gastric adenocarcinona, HER-2 negative.   - Started on neoadjuvant FLOT on 3/7/2020. She has required a dose reduction of her oxaliplatin by 30% due to cold sensitivity/neuropathy with cycle #5. 5FU was decreased to 85% with cycle #6 due to mucositis/diarrhea/taste changes.   Received C#8 on 8/14/2020.  PET/CT on 9/2/2020 did not show evidence of disease.  Small peritoneal nodule was a stable.  This was most consistent with fat necrosis.    CT chest abdomen and pelvis on 9/23/2020 also does not show evidence of malignancy recurrence and the peritoneal nodule in the right upper quadrant is stable.    She saw Dr. Mallory on 9/25/2020 who recommended proceeding with surgery on 10/21/2020.    I would like to see her back 1 month after the surgery    Anemia- Hg 7.6  Check anemia workup. Iron deficiency was noticed in May 2020. I would recommend giving Infed. We discussed the rationale, schedule and potential side effects of Infed. We will try to arrange asap.  There is a plan by surgery to give procrit and it is reasonable but it is important to have good iron stores for procrit to work better.    RUQ pain-   Better-  cholangitis has resolved. Cont to follow with palliative care. She is on oxycodone.    Diet/Exercise.  She is doing better and I encouraged her to eat healthy and exercise regularly to recuperate faster.    Neuropathy.  Getting better after completing chemo. This is a good sign. We also discussed that she can consider acupuncture. Otherwise continue to observe.     RTC 1 month after the surgery.    All questions answered and she is agreeable with the plan.    Magaly Pollard MD

## 2020-10-08 NOTE — PROGRESS NOTES
"Nathalie Bashir is a 54 year old female who is being evaluated via a billable video visit.      The patient has been notified of following:     \"This video visit will be conducted via a call between you and your physician/provider. We have found that certain health care needs can be provided without the need for an in-person physical exam.  This service lets us provide the care you need with a video conversation.  If a prescription is necessary we can send it directly to your pharmacy.  If lab work is needed we can place an order for that and you can then stop by our lab to have the test done at a later time.    Video visits are billed at different rates depending on your insurance coverage.  Please reach out to your insurance provider with any questions.    If during the course of the call the physician/provider feels a video visit is not appropriate, you will not be charged for this service.\"    Patient has given verbal consent for Video visit? Yes    How would you like to obtain your AVS? MyChart     If you are dropped from the video visit, the video invite should be resent to: Text to cell phone: [phone]     Will anyone else be joining your video visit? No       I have reviewed and updated the patient's allergies and pt confirmed any changes to their medication list.  Patient was asked to provide any patient recorded vital signs, height and/or weight.  Please see \"Patient Reported Vital Signs\" tab for that information.  Patient instructed to be in the virtual waiting room 10-15 minutes prior to appointment time.      Concerns: Patient has no new concerns        Refills: None        HERLINDA Buchanan        Video-Visit Details    Type of service:  Video Visit    Video Start Time: 12:35 PM  Video End Time: 12:PM    Originating Location (pt. Location): Home    Distant Location (provider location):  [facility]     Platform used for Video Visit: Miguel A Pollard MD        "

## 2020-10-08 NOTE — TELEPHONE ENCOUNTER
Physician from [employer] appeals dept called to discuss appeal case for Procrit. Physician asked if patient was currently receiving chemotherapy, and if chemotherapy would be given for at least two months post-surgery. This writer stated that the patient's 8th cycle of FLOT had been given on 8/14/2020 and that there was no apparent plan for continuation of chemotherapy at this point in time.     Prescriber stated that they could approve coverage of Procrit if the patient would be continuing chemotherapy or radiotherapy post-surgery.  Prescriber also stated that if neither of these criteria were met, the patient would need to have a hemoglobin level higher than 10 and lower than 13 prior to coverage approval. Prescriber stated that their recommendations are for transfusion, rather than the use of Procrit, in a patient with a hemoglobin level lower than 8. They are unable to approve coverage at this time.    Prescriber stated that they can re-open/review the case should there be any additional information/chart notes supporting any of the mentioned categories (chemo/radiotherapy post-op plan, or applicable hemoglobin levels.)     Alan Li  [facility]   Oncology Pharmacy Liaison  [email or web address]   [phone] (phone)   [phone] (fax)

## 2020-10-08 NOTE — LETTER
"    10/8/2020         RE: Nathalie Bashir  [address] [address]        Dear Colleague,    Thank you for referring your patient, Nathalie Bashir, to the [facility]. Please see a copy of my visit note below.    Nathalie Bashir is a 54 year old female who is being evaluated via a billable video visit.      The patient has been notified of following:     \"This video visit will be conducted via a call between you and your physician/provider. We have found that certain health care needs can be provided without the need for an in-person physical exam.  This service lets us provide the care you need with a video conversation.  If a prescription is necessary we can send it directly to your pharmacy.  If lab work is needed we can place an order for that and you can then stop by our lab to have the test done at a later time.    Video visits are billed at different rates depending on your insurance coverage.  Please reach out to your insurance provider with any questions.    If during the course of the call the physician/provider feels a video visit is not appropriate, you will not be charged for this service.\"    Patient has given verbal consent for Video visit? Yes    How would you like to obtain your AVS? MyChart     If you are dropped from the video visit, the video invite should be resent to: Text to cell phone: [phone]     Will anyone else be joining your video visit? No       I have reviewed and updated the patient's allergies and pt confirmed any changes to their medication list.  Patient was asked to provide any patient recorded vital signs, height and/or weight.  Please see \"Patient Reported Vital Signs\" tab for that information.  Patient instructed to be in the virtual waiting room 10-15 minutes prior to appointment time.      Concerns: Patient has no new concerns        Refills: None        HERLINDA Buchanan        Video-Visit Details    Type of service:  Video " Visit    Video Start Time: 12:35 PM  Video End Time: 12:PM    Originating Location (pt. Location): Home    Distant Location (provider location):  [facility]     Platform used for Video Visit: Miguel A Pollard MD          Oncology/Hematology Visit Note  Oct 8, 2020    Reason for Visit: Follow up of gastric adenocarcinoma.    History of Present Illness:     Please see previous notes for detail. I have copied and updated from prior notes.    Ms. Bashir is a 54 year old female with a history of gastric adenocarcinoma.  She was being followed for a pancreatic duct dilatation and pancreatic cyst which was noted in November 2018.  Since May 2019 she started noticing some nausea and vomiting and occasional abdominal discomfort.  She had an ultrasound in August 2019 which was essentially unremarkable.  She had a repeat endoscopic ultrasound on 10/29/2019 which showed increase in size of the cyst as well as dilation of the main pancreatic duct.  FNA and fluid analysis showed mucinous epithelium.  She was referred to Dr. 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  Surprisingly there was no pancreatic ductal neoplasm seen but on the resected specimen stomach cancer was noted.  It was poorly differentiated adenocarcinoma with poorly cohesive/signet ring cell type with proximal gastric mucosal and serosal margins being positive.  There was lymphovascular and perineural invasion seen.  22 lymph nodes were sampled and all were benign.  It was a pT4aN0 lesion.  HER-2/christine FISH was not amplified.         EUS on 3/3/2020 without clear evidence of neoplasm endoscopically. Left gastric lymph node fine-needle aspiration was negative for carcinoma. Biopsy from the gastric jejunal anastomosis as well as lesser curvature of the stomach also did not show any malignancy.     She had a PET/CT on 3/13/2020 which showed soft tissue streaky density with increased FDG uptake  adjacent to the pancreaticojejunostomy which could be neoplastic in origin.  There is mild increased FDG uptake in the gastric remnant.  Focal area of soft tissue thickening with fatty streakiness along medial laparotomy with increased FDG uptake which likely is inflammatory. Increased FDG uptake in thoracic esophagus which could be esophagitis.      She started on chemotherapy with 5FU, oxaliplatin, and Taxotere on 3/27/20. She was seen on 4/7/20 for evaluation of fevers. She was treated for possible pneumonia with Levaquin.      She was seen in clinic 4/13/20 for RUQ pain,CT and labs reassuring. She received cycles 2 and 3 of chemotherapy on 4/20/20 and 5/4/20. She was admitted from 5/21-5/23/20 with neutropenic aspiration pneumonia. She received cycle 4 on 5/27/20 with Neulasta support. PET/CT on 6/3/20 showed mild residual uptake at the site of gastrojejunal anastomosis which likely is physiologic.  There is almost resolution of soft tissue nodule next to the pancreaticoduodenal anastomosis without FDG uptake.  There is focal increased uptake in the left posterior acetabulum which could be artifact.     Decreased dose of oxaliplatin due to neuropathy/cold sensitivity on 6/11. Decreased 5FU to 85% with cycle #6 due to mucositis/diarrhea/taste changes. Treatment was held 7/9/20 due to diarrhea, dehydration, tachycardia, rash, and weight loss and she got cycle 7 on 7/15. On 7/20 she presented to the ED with RUQ abdominal pain, and was admitted 7/20-7/29 for acute cholangitis     She received C#8 FLOT on 8/14/2020.    PET/CT on 9/2/2020 did not show evidence of disease.  Small peritoneal nodule was a stable.  This was most consistent with fat necrosis.    CT chest abdomen and pelvis on 9/23/2020 also does not show evidence of malignancy recurrence and the peritoneal nodule in the right upper quadrant is stable.    She saw Dr. Mallory on 9/25/2020 who recommended proceeding with surgery but recently her hemoglobin was  7.6 so he was planning to give her Procrit and all blood transfusion before the proposed surgery on 10/21/2020.      Interval History:  -this is a video visit. She feels better. Appetite is better. No nausea or vomiting now. Bowels are working well. No melena or hematochezia. Energy is improving. She is trying to walk regularly.  She has neuropathy in hands/feet. Feels tingling/numbness and occasional pain in the feet. It is better now as opposed to when she was on chemo.  No more infections. No dyspnea. No new swellings. Right upper abdomen/rib cage area pain is better and she is taking oxycodone 5mg on average 4 times a day.  Denies pica symptoms. Denies restless legs. Has gained some weight recently.    ECOG 1    ROS:  A comprehensive ROS was otherwise neg        Current Outpatient Medications   Medication Sig Dispense Refill     amitriptyline (ELAVIL) 25 MG tablet Take 75 mg by mouth At Bedtime        citalopram (CELEXA) 20 MG tablet Take 20 mg by mouth every morning        famotidine (PEPCID) 20 MG tablet Take 1 tablet (20 mg) by mouth 2 times daily 60 tablet 3     hydrOXYzine (ATARAX) 25 MG tablet Take 25 mg by mouth 3 times daily as needed for itching (allergies)       loperamide (IMODIUM) 2 MG capsule Take 4 mg by mouth 4 times daily as needed for diarrhea        ondansetron (ZOFRAN) 8 MG tablet Take 1 tablet (8 mg) by mouth every 8 hours as needed (Nausea/Vomiting) 90 tablet 3     oxyCODONE (ROXICODONE) 5 MG tablet Take 1-2 tablets (5-10 mg) by mouth 2 times daily as needed for severe pain 60 tablet 0     pantoprazole (PROTONIX) 40 MG EC tablet TAKE ONE TABLET BY MOUTH TWICE A DAY 60 tablet 1     potassium chloride ER (KLOR-CON M) 20 MEQ CR tablet TAKE ONE TABLET BY MOUTH ONCE DAILY 30 tablet 1     simethicone 80 MG TABS Take 1 tablet by mouth every 6 hours as needed (bloating, gas, belching) 90 tablet 3     SUMAtriptan (IMITREX) 100 MG tablet Take 100 mg by mouth at onset of headache for migraine          Physical Examination:    LMP 09/23/2014   Wt Readings from Last 10 Encounters:   09/25/20 78 kg (172 lb)   09/25/20 77.8 kg (171 lb 9.6 oz)   09/04/20 76.1 kg (167 lb 11.2 oz)   08/21/20 75.9 kg (167 lb 6.4 oz)   08/14/20 82.6 kg (182 lb)   08/06/20 87.2 kg (192 lb 3.2 oz)   07/29/20 89.2 kg (196 lb 11.2 oz)   07/15/20 79.4 kg (175 lb)   07/08/20 75.7 kg (166 lb 14.4 oz)   07/08/20 75.7 kg (166 lb 14.4 oz)         Constitutional.  Does not seem to be in any acute distress.  Eyes.  No redness or discharge noted.  Respiratory.  Speaking in full sentences.  Breathing seems comfortable without any accessory use of muscles.    Skin.  Visualized his skin does not show any obvious rashes.  Musculoskeletal.  Range of motion for visualized areas is intact.  Neurological.  Alert and oriented x3.  Psychiatric.  Mood, mentation and affect are normal.  Decision making capacity is intact.      The rest of a comprehensive physical examination is deferred due to Public Health Emergency video visit restrictions.      Laboratory Data:     Reviewed      Assessment and Plan:    Gastric adenocarcinona, HER-2 negative.   - Started on neoadjuvant FLOT on 3/7/2020. She has required a dose reduction of her oxaliplatin by 30% due to cold sensitivity/neuropathy with cycle #5. 5FU was decreased to 85% with cycle #6 due to mucositis/diarrhea/taste changes.   Received C#8 on 8/14/2020.  PET/CT on 9/2/2020 did not show evidence of disease.  Small peritoneal nodule was a stable.  This was most consistent with fat necrosis.    CT chest abdomen and pelvis on 9/23/2020 also does not show evidence of malignancy recurrence and the peritoneal nodule in the right upper quadrant is stable.    She saw Dr. Mallory on 9/25/2020 who recommended proceeding with surgery on 10/21/2020.    I would like to see her back 1 month after the surgery    Anemia- Hg 7.6  Check anemia workup. Iron deficiency was noticed in May 2020. I would recommend giving Infed. We  discussed the rationale, schedule and potential side effects of Infed. We will try to arrange asap.  There is a plan by surgery to give procrit and it is reasonable but it is important to have good iron stores for procrit to work better.    RUQ pain-   Better- cholangitis has resolved. Cont to follow with palliative care. She is on oxycodone.    Diet/Exercise.  She is doing better and I encouraged her to eat healthy and exercise regularly to recuperate faster.    Neuropathy.  Getting better after completing chemo. This is a good sign. We also discussed that she can consider acupuncture. Otherwise continue to observe.     RTC 1 month after the surgery.    All questions answered and she is agreeable with the plan.    Magaly Pollard MD

## 2020-10-09 ENCOUNTER — PATIENT OUTREACH (OUTPATIENT)
Dept: SURGERY | Facility: CLINIC | Age: 55
End: 2020-10-09

## 2020-10-09 DIAGNOSIS — C16.9 GASTRIC ADENOCARCINOMA (H): Primary | ICD-10-CM

## 2020-10-09 RX ORDER — HEPARIN SODIUM (PORCINE) LOCK FLUSH IV SOLN 100 UNIT/ML 100 UNIT/ML
5 SOLUTION INTRAVENOUS
Status: CANCELLED | OUTPATIENT
Start: 2020-10-12

## 2020-10-09 RX ORDER — HEPARIN SODIUM (PORCINE) LOCK FLUSH IV SOLN 100 UNIT/ML 100 UNIT/ML
5 SOLUTION INTRAVENOUS
Status: CANCELLED | OUTPATIENT
Start: 2020-10-09

## 2020-10-09 RX ORDER — HEPARIN SODIUM,PORCINE 10 UNIT/ML
5 VIAL (ML) INTRAVENOUS
Status: CANCELLED | OUTPATIENT
Start: 2020-10-09

## 2020-10-09 NOTE — TELEPHONE ENCOUNTER
Surgical Oncology RN Care Coordination Note:     Called and spoke with patient regarding plan for arranging a transfusion on Monday. Informed her that our office will be reaching out to her to confirm time once we confirm orders. She agreed with plan and will await call.     Kenzie Ace RN, BSN  Care Coordinator   [phone]

## 2020-10-10 DIAGNOSIS — C16.9 GASTRIC ADENOCARCINOMA (H): ICD-10-CM

## 2020-10-10 DIAGNOSIS — Z11.59 ENCOUNTER FOR SCREENING FOR OTHER VIRAL DISEASES: ICD-10-CM

## 2020-10-10 DIAGNOSIS — D50.9 IRON DEFICIENCY ANEMIA, UNSPECIFIED IRON DEFICIENCY ANEMIA TYPE: ICD-10-CM

## 2020-10-10 DIAGNOSIS — D64.9 ANEMIA, UNSPECIFIED TYPE: ICD-10-CM

## 2020-10-10 LAB
ABO + RH BLD: NORMAL
ABO + RH BLD: NORMAL
BASOPHILS # BLD AUTO: 0 10E9/L (ref 0–0.2)
BASOPHILS NFR BLD AUTO: 0.4 %
BLD GP AB SCN SERPL QL: NORMAL
BLD PROD TYP BPU: NORMAL
BLOOD BANK CMNT PATIENT-IMP: NORMAL
DIFFERENTIAL METHOD BLD: ABNORMAL
EOSINOPHIL # BLD AUTO: 0.2 10E9/L (ref 0–0.7)
EOSINOPHIL NFR BLD AUTO: 5.1 %
ERYTHROCYTE [DISTWIDTH] IN BLOOD BY AUTOMATED COUNT: 16.7 % (ref 10–15)
FERRITIN SERPL-MCNC: 10 NG/ML (ref 8–252)
FOLATE SERPL-MCNC: 12.4 NG/ML
HCT VFR BLD AUTO: 25.4 % (ref 35–47)
HGB BLD-MCNC: 7.7 G/DL (ref 11.7–15.7)
IMM GRANULOCYTES # BLD: 0 10E9/L (ref 0–0.4)
IMM GRANULOCYTES NFR BLD: 0.2 %
IRON SATN MFR SERPL: 4 % (ref 15–46)
IRON SERPL-MCNC: 21 UG/DL (ref 35–180)
LYMPHOCYTES # BLD AUTO: 1.4 10E9/L (ref 0.8–5.3)
LYMPHOCYTES NFR BLD AUTO: 31.1 %
MCH RBC QN AUTO: 26.5 PG (ref 26.5–33)
MCHC RBC AUTO-ENTMCNC: 30.3 G/DL (ref 31.5–36.5)
MCV RBC AUTO: 87 FL (ref 78–100)
MONOCYTES # BLD AUTO: 0.4 10E9/L (ref 0–1.3)
MONOCYTES NFR BLD AUTO: 9.1 %
NEUTROPHILS # BLD AUTO: 2.4 10E9/L (ref 1.6–8.3)
NEUTROPHILS NFR BLD AUTO: 54.1 %
NRBC # BLD AUTO: 0 10*3/UL
NRBC BLD AUTO-RTO: 0 /100
NUM BPU REQUESTED: 2
PLATELET # BLD AUTO: 244 10E9/L (ref 150–450)
PLATELET # BLD EST: ABNORMAL 10*3/UL
RBC # BLD AUTO: 2.91 10E12/L (ref 3.8–5.2)
RETICS # AUTO: 49.2 10E9/L (ref 25–95)
RETICS/RBC NFR AUTO: 1.7 % (ref 0.5–2)
SARS-COV-2 RNA SPEC QL NAA+PROBE: NOT DETECTED
SPECIMEN EXP DATE BLD: NORMAL
SPECIMEN SOURCE: NORMAL
TIBC SERPL-MCNC: 486 UG/DL (ref 240–430)
VIT B12 SERPL-MCNC: 921 PG/ML (ref 193–986)
WBC # BLD AUTO: 4.5 10E9/L (ref 4–11)

## 2020-10-10 PROCEDURE — 86900 BLOOD TYPING SEROLOGIC ABO: CPT | Performed by: PATHOLOGY

## 2020-10-10 PROCEDURE — 86901 BLOOD TYPING SEROLOGIC RH(D): CPT | Performed by: PATHOLOGY

## 2020-10-10 PROCEDURE — 86850 RBC ANTIBODY SCREEN: CPT | Performed by: PATHOLOGY

## 2020-10-10 PROCEDURE — 36415 COLL VENOUS BLD VENIPUNCTURE: CPT | Performed by: PATHOLOGY

## 2020-10-10 PROCEDURE — 85060 BLOOD SMEAR INTERPRETATION: CPT | Performed by: PATHOLOGY

## 2020-10-10 PROCEDURE — U0003 INFECTIOUS AGENT DETECTION BY NUCLEIC ACID (DNA OR RNA); SEVERE ACUTE RESPIRATORY SYNDROME CORONAVIRUS 2 (SARS-COV-2) (CORONAVIRUS DISEASE [COVID-19]), AMPLIFIED PROBE TECHNIQUE, MAKING USE OF HIGH THROUGHPUT TECHNOLOGIES AS DESCRIBED BY CMS-2020-01-R: HCPCS | Performed by: PATHOLOGY

## 2020-10-10 PROCEDURE — 82607 VITAMIN B-12: CPT | Performed by: PATHOLOGY

## 2020-10-10 PROCEDURE — 82728 ASSAY OF FERRITIN: CPT | Performed by: PATHOLOGY

## 2020-10-10 PROCEDURE — 86923 COMPATIBILITY TEST ELECTRIC: CPT | Performed by: SURGERY

## 2020-10-10 PROCEDURE — 82746 ASSAY OF FOLIC ACID SERUM: CPT | Performed by: INTERNAL MEDICINE

## 2020-10-10 PROCEDURE — 85045 AUTOMATED RETICULOCYTE COUNT: CPT | Performed by: PATHOLOGY

## 2020-10-10 PROCEDURE — 82668 ASSAY OF ERYTHROPOIETIN: CPT | Performed by: SURGERY

## 2020-10-10 PROCEDURE — 36415 COLL VENOUS BLD VENIPUNCTURE: CPT

## 2020-10-10 PROCEDURE — 83550 IRON BINDING TEST: CPT | Performed by: PATHOLOGY

## 2020-10-10 PROCEDURE — 85025 COMPLETE CBC W/AUTO DIFF WBC: CPT | Performed by: PATHOLOGY

## 2020-10-10 PROCEDURE — 83540 ASSAY OF IRON: CPT | Performed by: PATHOLOGY

## 2020-10-10 PROCEDURE — 999N001086 HC STATISTIC MORPHOLOGY W/INTERP HEMEPATH TC 85060: Performed by: INTERNAL MEDICINE

## 2020-10-10 RX ORDER — HEPARIN SODIUM (PORCINE) LOCK FLUSH IV SOLN 100 UNIT/ML 100 UNIT/ML
5 SOLUTION INTRAVENOUS
Status: CANCELLED | OUTPATIENT
Start: 2020-10-10

## 2020-10-10 RX ORDER — HEPARIN SODIUM,PORCINE 10 UNIT/ML
5 VIAL (ML) INTRAVENOUS
Status: CANCELLED | OUTPATIENT
Start: 2020-10-10

## 2020-10-10 NOTE — NURSING NOTE
Chief Complaint   Patient presents with     Lab Only     labs drawn with vpt by rn.     Labs drawn with vpt by rn.  Pt tolerated well.  VS taken.  Pt checked in for next appt.    Jo Disla RN

## 2020-10-11 ENCOUNTER — INFUSION THERAPY VISIT (OUTPATIENT)
Dept: ONCOLOGY | Facility: CLINIC | Age: 55
End: 2020-10-11
Attending: SURGERY
Payer: MEDICARE

## 2020-10-11 VITALS
RESPIRATION RATE: 16 BRPM | DIASTOLIC BLOOD PRESSURE: 60 MMHG | SYSTOLIC BLOOD PRESSURE: 109 MMHG | HEART RATE: 100 BPM | TEMPERATURE: 98.7 F | OXYGEN SATURATION: 99 %

## 2020-10-11 DIAGNOSIS — D50.9 IRON DEFICIENCY ANEMIA, UNSPECIFIED IRON DEFICIENCY ANEMIA TYPE: ICD-10-CM

## 2020-10-11 DIAGNOSIS — C16.8 MALIGNANT NEOPLASM OF OVERLAPPING SITES OF STOMACH (H): Primary | ICD-10-CM

## 2020-10-11 LAB
BLD PROD TYP BPU: NORMAL
BLD PROD TYP BPU: NORMAL
BLD UNIT ID BPU: 0
BLD UNIT ID BPU: 0
BLOOD PRODUCT CODE: NORMAL
BLOOD PRODUCT CODE: NORMAL
BPU ID: NORMAL
BPU ID: NORMAL
TRANSFUSION STATUS PATIENT QL: NORMAL

## 2020-10-11 PROCEDURE — 99207 PR NO CHARGE LOS: CPT

## 2020-10-11 PROCEDURE — 250N000011 HC RX IP 250 OP 636: Performed by: PHYSICIAN ASSISTANT

## 2020-10-11 PROCEDURE — 36430 TRANSFUSION BLD/BLD COMPNT: CPT

## 2020-10-11 PROCEDURE — P9016 RBC LEUKOCYTES REDUCED: HCPCS | Performed by: SURGERY

## 2020-10-11 RX ORDER — HEPARIN SODIUM (PORCINE) LOCK FLUSH IV SOLN 100 UNIT/ML 100 UNIT/ML
5 SOLUTION INTRAVENOUS
Status: DISCONTINUED | OUTPATIENT
Start: 2020-10-11 | End: 2020-10-11 | Stop reason: HOSPADM

## 2020-10-11 RX ADMIN — Medication 5 ML: at 10:37

## 2020-10-11 NOTE — PATIENT INSTRUCTIONS
Contact Numbers  [facility] Nurse Triage: [phone]    Please call the [facility] Triage line if you experience a temperature greater than or equal to 100.5, shaking chills, have uncontrolled nausea, vomiting and/or diarrhea, dizziness, shortness of breath, chest pain, bleeding, unexplained bruising, or if you have any other new/concerning symptoms, questions or concerns.     If you are having any concerning symptoms or wish to speak to a provider before your next infusion visit, please call your care coordinator or triage to notify them so we can adequately serve you.     If you need a refill on a prescription or other medication, please call triage before your infusion appointment.       October 2020 Sunday Monday Tuesday Wednesday Thursday Friday Saturday                       1     2    LAB  12:00 PM   (15 min.)    LAB   [facility] 3       4     5     6     7     8    VIDEO VISIT RETURN  12:15 PM   (30 min.)   Magaly Pollard MD   [facility] 9     10    PRE-PROCEDURE COVID PCR  10:15 AM   (15 min.)    COVID LAB   [facility] DRAW  10:45 AM   (15 min.)    [facility] DRAW   [facility]   11    [facility] ONC INFUSION 240   7:00 AM   (240 min.)    ONCOLOGY INFUSION   [facility] 12     13  Happy Birthday!    LAPAROSCOPY, DIAGNOSTIC, BY GENERAL SURGERY   8:00 AM   Yandel Mallory MD   [facility] OR 14     15     16     17       18     19    PRE-PROCEDURE COVID PCR  10:30 AM   (15 min.)    COVID LAB   [facility]    LAB  11:00 AM   (15 min.)    LAB   [facility] 20     21    GASTRECTOMY, OPEN   7:45 AM   Yandel Mallory MD   [facility] OR 22     23     24       25     26     27     28     29    TELEPHONE VISIT RETURN   2:00 PM   (45 min.)   Al Marie MD   [facility] 30     31                  November 2020 Sunday Monday Tuesday Wednesday Thursday Friday Saturday   1     2     3     4     5     6    [facility] RETURN  10:45 AM   (30 min.)   Yandel Mallory MD   [facility] 7       8     9     10     11     12     13     14       15     16     17     18     19     20     21       22     23     24     25     26     27     28       29     30                                              Lab Results:  No results found for this or any previous visit (from the past 12 hour(s)).

## 2020-10-11 NOTE — PROGRESS NOTES
"Infusion Nursing Note:  Nathalie Bashir presents today for 2 units PRBC.    Patient seen by provider today: No    Intravenous Access:  Implanted Port.    Treatment Conditions:  Lab Results   Component Value Date    HGB 7.7 10/10/2020     Lab Results   Component Value Date    WBC 4.5 10/10/2020      Lab Results   Component Value Date    ANEU 2.4 10/10/2020     Lab Results   Component Value Date     10/10/2020      Results reviewed, labs MET treatment parameters, ok to proceed with treatment.  Blood transfusion consent signed 10/9/2020.    Note: Patient's Hgb from 10/10/20 was 7.7; meets parameters for transfusion. Has procedure scheduled for 10/13/20 with Dr. Mallory.    Patient reports feeling \"pretty good\" overall and offers no new complaints or concerns.    Patient presents to infusion for PRBC transfusion. This is patient's first transfusion. Transfusion process and possible side effects reviewed with patient. Written information also reviewed and given to patient. Patient's questions answered to her satisfaction.    Post Infusion Assessment:  Patient tolerated infusion without incident.  Blood return noted pre and post infusion.  Site patent and intact, free from redness, edema or discomfort.  No evidence of extravasations.  Access discontinued per protocol.    Discharge Plan:   Patient declined prescription refills.  Discharge instructions reviewed with: Patient.  Patient and/or family verbalized understanding of discharge instructions and all questions answered.  Copy of AVS reviewed with patient and/or family.  Patient will return 10/13/20 for next appointment-procedure.  Patient discharged in stable condition accompanied by: self.  Departure Mode: Ambulatory with cane.    Myranda Duke RN    "

## 2020-10-12 LAB — COPATH REPORT: NORMAL

## 2020-10-13 ENCOUNTER — DOCUMENTATION ONLY (OUTPATIENT)
Dept: SURGERY | Facility: CLINIC | Age: 55
End: 2020-10-13

## 2020-10-13 ENCOUNTER — HOSPITAL ENCOUNTER (OUTPATIENT)
Facility: CLINIC | Age: 55
Discharge: HOME | End: 2020-10-13
Attending: SURGERY | Admitting: SURGERY
Payer: MEDICARE

## 2020-10-13 VITALS
HEART RATE: 86 BPM | WEIGHT: 164.46 LBS | DIASTOLIC BLOOD PRESSURE: 84 MMHG | RESPIRATION RATE: 16 BRPM | OXYGEN SATURATION: 97 % | TEMPERATURE: 97.8 F | BODY MASS INDEX: 24.36 KG/M2 | HEIGHT: 69 IN | SYSTOLIC BLOOD PRESSURE: 108 MMHG

## 2020-10-13 DIAGNOSIS — C16.8 MALIGNANT NEOPLASM OF OVERLAPPING SITES OF STOMACH (H): Primary | ICD-10-CM

## 2020-10-13 DIAGNOSIS — D64.9 ANEMIA: ICD-10-CM

## 2020-10-13 DIAGNOSIS — C16.8 MALIGNANT NEOPLASM OF OVERLAPPING SITES OF STOMACH (H): ICD-10-CM

## 2020-10-13 LAB
ABO + RH BLD: NORMAL
ABO + RH BLD: NORMAL
BLD GP AB SCN SERPL QL: NORMAL
BLOOD BANK CMNT PATIENT-IMP: NORMAL
EPO SERPL-ACNC: 129 MU/ML (ref 4–27)
GLUCOSE BLDC GLUCOMTR-MCNC: 105 MG/DL (ref 70–99)
HGB BLD-MCNC: 9.4 G/DL (ref 11.7–15.7)
SPECIMEN EXP DATE BLD: NORMAL

## 2020-10-13 PROCEDURE — 88112 CYTOPATH CELL ENHANCE TECH: CPT | Mod: TC | Performed by: SURGERY

## 2020-10-13 PROCEDURE — 88112 CYTOPATH CELL ENHANCE TECH: CPT | Mod: 26 | Performed by: PATHOLOGY

## 2020-10-13 PROCEDURE — 250N000003 HC SEVOFLURANE, EA 15 MIN: Performed by: SURGERY

## 2020-10-13 PROCEDURE — 96372 THER/PROPH/DIAG INJ SC/IM: CPT | Performed by: STUDENT IN AN ORGANIZED HEALTH CARE EDUCATION/TRAINING PROGRAM

## 2020-10-13 PROCEDURE — 250N000013 HC RX MED GY IP 250 OP 250 PS 637: Mod: GY | Performed by: ANESTHESIOLOGY

## 2020-10-13 PROCEDURE — 360N000022 HC SURGERY LEVEL 3 1ST 30 MIN - UMMC: Performed by: SURGERY

## 2020-10-13 PROCEDURE — 999N001017 HC STATISTIC GLUCOSE BY METER IP

## 2020-10-13 PROCEDURE — 86900 BLOOD TYPING SEROLOGIC ABO: CPT | Performed by: NURSE PRACTITIONER

## 2020-10-13 PROCEDURE — 999N000157 HC STATISTIC RCP TIME EA 10 MIN

## 2020-10-13 PROCEDURE — 999N000015 HC STATISTIC ARTERIAL MONITORING DAILY

## 2020-10-13 PROCEDURE — 36415 COLL VENOUS BLD VENIPUNCTURE: CPT | Performed by: NURSE PRACTITIONER

## 2020-10-13 PROCEDURE — 250N000009 HC RX 250: Performed by: NURSE ANESTHETIST, CERTIFIED REGISTERED

## 2020-10-13 PROCEDURE — 250N000011 HC RX IP 250 OP 636: Performed by: STUDENT IN AN ORGANIZED HEALTH CARE EDUCATION/TRAINING PROGRAM

## 2020-10-13 PROCEDURE — 250N000011 HC RX IP 250 OP 636: Performed by: NURSE ANESTHETIST, CERTIFIED REGISTERED

## 2020-10-13 PROCEDURE — 761N000003 HC RECOVERY PHASE 1 LEVEL 2 FIRST HR: Performed by: SURGERY

## 2020-10-13 PROCEDURE — 86901 BLOOD TYPING SEROLOGIC RH(D): CPT | Performed by: NURSE PRACTITIONER

## 2020-10-13 PROCEDURE — 88305 TISSUE EXAM BY PATHOLOGIST: CPT | Mod: 26 | Performed by: PATHOLOGY

## 2020-10-13 PROCEDURE — 370N000002 HC ANESTHESIA TECHNICAL FEE, EACH ADDTL 15 MIN: Performed by: SURGERY

## 2020-10-13 PROCEDURE — 999N001018 HC STATISTIC H-CELL BLOCK W/STAIN: Performed by: SURGERY

## 2020-10-13 PROCEDURE — 999N000140 HC STATISTIC PRE-PROCEDURE ASSESSMENT III: Performed by: SURGERY

## 2020-10-13 PROCEDURE — 272N000001 HC OR GENERAL SUPPLY STERILE: Performed by: SURGERY

## 2020-10-13 PROCEDURE — 85018 HEMOGLOBIN: CPT | Performed by: NURSE PRACTITIONER

## 2020-10-13 PROCEDURE — 86850 RBC ANTIBODY SCREEN: CPT | Performed by: NURSE PRACTITIONER

## 2020-10-13 PROCEDURE — 250N000009 HC RX 250: Performed by: STUDENT IN AN ORGANIZED HEALTH CARE EDUCATION/TRAINING PROGRAM

## 2020-10-13 PROCEDURE — 49320 DIAG LAPARO SEPARATE PROC: CPT | Mod: GC | Performed by: SURGERY

## 2020-10-13 PROCEDURE — 258N000003 HC RX IP 258 OP 636: Performed by: NURSE ANESTHETIST, CERTIFIED REGISTERED

## 2020-10-13 PROCEDURE — 258N000003 HC RX IP 258 OP 636: Performed by: STUDENT IN AN ORGANIZED HEALTH CARE EDUCATION/TRAINING PROGRAM

## 2020-10-13 PROCEDURE — 360N000023 HC SURGERY LEVEL 3 EA 15 ADDTL MIN UMMC: Performed by: SURGERY

## 2020-10-13 PROCEDURE — 250N000011 HC RX IP 250 OP 636: Performed by: ANESTHESIOLOGY

## 2020-10-13 PROCEDURE — 370N000001 HC ANESTHESIA TECHNICAL FEE, 1ST 30 MIN: Performed by: SURGERY

## 2020-10-13 PROCEDURE — 761N000007 HC RECOVERY PHASE 2 EACH 15 MINS: Performed by: SURGERY

## 2020-10-13 PROCEDURE — 88305 TISSUE EXAM BY PATHOLOGIST: CPT | Mod: TC | Performed by: SURGERY

## 2020-10-13 RX ORDER — DEXAMETHASONE SODIUM PHOSPHATE 4 MG/ML
INJECTION, SOLUTION INTRA-ARTICULAR; INTRALESIONAL; INTRAMUSCULAR; INTRAVENOUS; SOFT TISSUE PRN
Status: DISCONTINUED | OUTPATIENT
Start: 2020-10-13 | End: 2020-10-13

## 2020-10-13 RX ORDER — SODIUM CHLORIDE, SODIUM LACTATE, POTASSIUM CHLORIDE, CALCIUM CHLORIDE 600; 310; 30; 20 MG/100ML; MG/100ML; MG/100ML; MG/100ML
INJECTION, SOLUTION INTRAVENOUS CONTINUOUS PRN
Status: DISCONTINUED | OUTPATIENT
Start: 2020-10-13 | End: 2020-10-13

## 2020-10-13 RX ORDER — CEFAZOLIN SODIUM 1 G/3ML
1 INJECTION, POWDER, FOR SOLUTION INTRAMUSCULAR; INTRAVENOUS SEE ADMIN INSTRUCTIONS
Status: DISCONTINUED | OUTPATIENT
Start: 2020-10-13 | End: 2020-10-13 | Stop reason: HOSPADM

## 2020-10-13 RX ORDER — PROPOFOL 10 MG/ML
INJECTION, EMULSION INTRAVENOUS PRN
Status: DISCONTINUED | OUTPATIENT
Start: 2020-10-13 | End: 2020-10-13

## 2020-10-13 RX ORDER — NALOXONE HYDROCHLORIDE 0.4 MG/ML
.1-.4 INJECTION, SOLUTION INTRAMUSCULAR; INTRAVENOUS; SUBCUTANEOUS
Status: DISCONTINUED | OUTPATIENT
Start: 2020-10-13 | End: 2020-10-13 | Stop reason: HOSPADM

## 2020-10-13 RX ORDER — OXYCODONE HYDROCHLORIDE 5 MG/1
5 TABLET ORAL EVERY 6 HOURS PRN
Qty: 10 TABLET | Refills: 0 | Status: SHIPPED | OUTPATIENT
Start: 2020-10-13 | End: 2020-10-16

## 2020-10-13 RX ORDER — ONDANSETRON 2 MG/ML
INJECTION INTRAMUSCULAR; INTRAVENOUS PRN
Status: DISCONTINUED | OUTPATIENT
Start: 2020-10-13 | End: 2020-10-13

## 2020-10-13 RX ORDER — FLUMAZENIL 0.1 MG/ML
0.2 INJECTION, SOLUTION INTRAVENOUS
Status: DISCONTINUED | OUTPATIENT
Start: 2020-10-13 | End: 2020-10-13 | Stop reason: HOSPADM

## 2020-10-13 RX ORDER — LIDOCAINE HYDROCHLORIDE 20 MG/ML
INJECTION, SOLUTION INFILTRATION; PERINEURAL PRN
Status: DISCONTINUED | OUTPATIENT
Start: 2020-10-13 | End: 2020-10-13

## 2020-10-13 RX ORDER — LIDOCAINE 40 MG/G
CREAM TOPICAL
Status: DISCONTINUED | OUTPATIENT
Start: 2020-10-13 | End: 2020-10-13 | Stop reason: HOSPADM

## 2020-10-13 RX ORDER — OXYCODONE HYDROCHLORIDE 5 MG/1
5 TABLET ORAL EVERY 6 HOURS PRN
Qty: 10 TABLET | Refills: 0 | Status: SHIPPED | OUTPATIENT
Start: 2020-10-13 | End: 2020-10-13

## 2020-10-13 RX ORDER — BUPIVACAINE HYDROCHLORIDE 2.5 MG/ML
INJECTION, SOLUTION EPIDURAL; INFILTRATION; INTRACAUDAL PRN
Status: DISCONTINUED | OUTPATIENT
Start: 2020-10-13 | End: 2020-10-13

## 2020-10-13 RX ORDER — FENTANYL CITRATE 50 UG/ML
25-50 INJECTION, SOLUTION INTRAMUSCULAR; INTRAVENOUS
Status: DISCONTINUED | OUTPATIENT
Start: 2020-10-13 | End: 2020-10-13 | Stop reason: HOSPADM

## 2020-10-13 RX ORDER — FENTANYL CITRATE 50 UG/ML
INJECTION, SOLUTION INTRAMUSCULAR; INTRAVENOUS PRN
Status: DISCONTINUED | OUTPATIENT
Start: 2020-10-13 | End: 2020-10-13

## 2020-10-13 RX ORDER — SODIUM CHLORIDE, SODIUM LACTATE, POTASSIUM CHLORIDE, CALCIUM CHLORIDE 600; 310; 30; 20 MG/100ML; MG/100ML; MG/100ML; MG/100ML
500 INJECTION, SOLUTION INTRAVENOUS CONTINUOUS
Status: DISCONTINUED | OUTPATIENT
Start: 2020-10-13 | End: 2020-10-13 | Stop reason: HOSPADM

## 2020-10-13 RX ORDER — OXYCODONE HYDROCHLORIDE 5 MG/1
5 TABLET ORAL ONCE
Status: COMPLETED | OUTPATIENT
Start: 2020-10-13 | End: 2020-10-13

## 2020-10-13 RX ORDER — CEFAZOLIN SODIUM 2 G/100ML
2 INJECTION, SOLUTION INTRAVENOUS
Status: COMPLETED | OUTPATIENT
Start: 2020-10-13 | End: 2020-10-13

## 2020-10-13 RX ADMIN — BUPIVACAINE HYDROCHLORIDE 60 ML: 2.5 INJECTION, SOLUTION EPIDURAL; INFILTRATION; INTRACAUDAL; PERINEURAL at 07:55

## 2020-10-13 RX ADMIN — FENTANYL CITRATE 25 MCG: 50 INJECTION, SOLUTION INTRAMUSCULAR; INTRAVENOUS at 09:52

## 2020-10-13 RX ADMIN — OXYCODONE HYDROCHLORIDE 5 MG: 5 TABLET ORAL at 10:01

## 2020-10-13 RX ADMIN — FENTANYL CITRATE 50 MCG: 50 INJECTION, SOLUTION INTRAMUSCULAR; INTRAVENOUS at 09:00

## 2020-10-13 RX ADMIN — ONDANSETRON 4 MG: 2 INJECTION INTRAMUSCULAR; INTRAVENOUS at 08:31

## 2020-10-13 RX ADMIN — SUGAMMADEX 320 MG: 100 INJECTION, SOLUTION INTRAVENOUS at 09:13

## 2020-10-13 RX ADMIN — CEFAZOLIN 2 G: 10 INJECTION, POWDER, FOR SOLUTION INTRAVENOUS at 08:21

## 2020-10-13 RX ADMIN — FENTANYL CITRATE 25 MCG: 50 INJECTION, SOLUTION INTRAMUSCULAR; INTRAVENOUS at 09:40

## 2020-10-13 RX ADMIN — DEXAMETHASONE SODIUM PHOSPHATE 2 MG: 4 INJECTION, SOLUTION INTRA-ARTICULAR; INTRALESIONAL; INTRAMUSCULAR; INTRAVENOUS; SOFT TISSUE at 07:55

## 2020-10-13 RX ADMIN — ROCURONIUM BROMIDE 50 MG: 10 INJECTION INTRAVENOUS at 08:13

## 2020-10-13 RX ADMIN — LIDOCAINE HYDROCHLORIDE 80 MG: 20 INJECTION, SOLUTION INFILTRATION; PERINEURAL at 08:12

## 2020-10-13 RX ADMIN — FENTANYL CITRATE 100 MCG: 50 INJECTION, SOLUTION INTRAMUSCULAR; INTRAVENOUS at 08:12

## 2020-10-13 RX ADMIN — FENTANYL CITRATE 25 MCG: 50 INJECTION, SOLUTION INTRAMUSCULAR; INTRAVENOUS at 09:46

## 2020-10-13 RX ADMIN — DEXMEDETOMIDINE HYDROCHLORIDE 40 MCG: 100 INJECTION, SOLUTION INTRAVENOUS at 07:55

## 2020-10-13 RX ADMIN — ENOXAPARIN SODIUM 40 MG: 40 INJECTION SUBCUTANEOUS at 07:18

## 2020-10-13 RX ADMIN — PROPOFOL 120 MG: 10 INJECTION, EMULSION INTRAVENOUS at 08:12

## 2020-10-13 RX ADMIN — DEXAMETHASONE SODIUM PHOSPHATE 8 MG: 4 INJECTION, SOLUTION INTRA-ARTICULAR; INTRALESIONAL; INTRAMUSCULAR; INTRAVENOUS; SOFT TISSUE at 08:31

## 2020-10-13 RX ADMIN — FENTANYL CITRATE 25 MCG: 50 INJECTION, SOLUTION INTRAMUSCULAR; INTRAVENOUS at 09:35

## 2020-10-13 RX ADMIN — SODIUM CHLORIDE, POTASSIUM CHLORIDE, SODIUM LACTATE AND CALCIUM CHLORIDE: 600; 310; 30; 20 INJECTION, SOLUTION INTRAVENOUS at 08:03

## 2020-10-13 ASSESSMENT — MIFFLIN-ST. JEOR: SCORE: 1402.44

## 2020-10-13 NOTE — PROGRESS NOTES
"SPIRITUAL HEALTH SERVICES  [facility] 3C   PRE-SURGERY VISIT    Attempted pre-surgery visit with pt per pt request.  Pt declined, said \"maybe next time...\"   Osmel Beltran M.Div (Bill)., [facility]  Staff   Pager [phone]          "

## 2020-10-13 NOTE — DISCHARGE INSTRUCTIONS
[facility]  Same-Day Surgery   Adult Discharge Orders & Instructions     For 24 hours after surgery    1. Get plenty of rest.  A responsible adult must stay with you for at least 24 hours after you leave the hospital.   2. Do not drive or use heavy equipment.  If you have weakness or tingling, don't drive or use heavy equipment until this feeling goes away.  3. Do not drink alcohol.  4. Avoid strenuous or risky activities.  Ask for help when climbing stairs.   5. You may feel lightheaded.  IF so, sit for a few minutes before standing.  Have someone help you get up.   6. If you have nausea (feel sick to your stomach): Drink only clear liquids such as apple juice, ginger ale, broth or 7-Up.  Rest may also help.  Be sure to drink enough fluids.  Move to a regular diet as you feel able.  7. You may have a slight fever. Call the doctor if your fever is over 100 F (37.7 C) (taken under the tongue) or lasts longer than 24 hours.  8. You may have a dry mouth, a sore throat, muscle aches or trouble sleeping.  These should go away after 24 hours.  9. Do not make important or legal decisions.   Call your doctor for any of the followin.  Signs of infection (fever, growing tenderness at the surgery site, a large amount of drainage or bleeding, severe pain, foul-smelling drainage, redness, swelling).    2. It has been over 8 to 10 hours since surgery and you are still not able to urinate (pass water).    3.  Headache for over 24 hours.    4.  Numbness, tingling or weakness the day after surgery (if you had spinal anesthesia).  To contact a doctor, call ________________________________________ or:        [phone] and ask for the resident on call for   ______________________________________________ (answered 24 hours a day)      Emergency Department:    [facility]: [phone]       (TTY for hearing impaired: [phone])      Incision site(s): Keep dressing/incision  clean/dry/intact for 24 hours. 24 hours after your operation, you may remove any dressings and allow soapy water to wash over the wounds. If you have surgical glue over your incision, this will wear off in time. Apply dressings as desired. No submersion in water (lake/pool/tub) for one month or until approved by your surgeon.    Diet: Okay to resume regular diet    Activity Restrictions: No lifting greater than 20lbs for 2 weeks    Concerning signs/symptoms: If your incision develops redness or pus-like drainage, if you have a fever >101.5 DegF, or if you have any other concerning signs/symptoms, please call or seek medical attention.     -Ok to take acetaminophen or ibuprofen if your pain is not severe enough for narcotic medication.  -No driving while taking narcotic pain medication.  -It is recommendable to take stool softeners such as miralax while on narcotic pain medication to prevent constipation.    If you have questions or concerns, please contact Dr. Mallory's RN at [phone] during business hours. After hours, please call [phone] and ask to page the Surgical Oncology resident on call.

## 2020-10-13 NOTE — OR NURSING
ROWENA Dominguez will place sign out note when able. Patient can continue to phase II recovery. Pharmacy called having trouble filling another oxy script today since she just filled one last week at [facility]. Patient stated she still has some at home and does not need another script today.

## 2020-10-13 NOTE — ANESTHESIA PROCEDURE NOTES
Peripheral Nerve Block Procedure Note      Staff -   Anesthesiologist:  Stephanie Zelaya MD  Resident/Fellow: Kirby Munoz MD  Performed By: resident  Procedure performed by resident/CRNA in presence of a teaching physician.    Location: Pre-op  Procedure Start/Stop TImes:     patient identified, IV checked, site marked, risks and benefits discussed, informed consent, monitors and equipment checked, pre-op evaluation, at physician/surgeon's request and post-op pain management      Correct Patient: Yes      Correct Position: Yes      Correct Site: Yes      Correct Procedure: Yes      Correct Laterality:  Yes    Site Marked:  Yes  Procedure details:     Procedure:  TAP    ASA:  3    Diagnosis:  Post operative pain    Laterality:  Bilateral    Position:  Supine    Sterile Prep: chloraprep, mask and sterile gloves      Local skin infiltration:  None    Needle:  Short bevel    Needle gauge:  21    Needle length (mm):  110    Ultrasound: Yes      Ultrasound used to identify targeted nerve, plexus, or vascular structure and placed a needle adjacent to it      Permanent Image entered into patiient's record      Abnormal pain on injection: No      Blood Aspirated: No      Paresthesias:  No    Bleeding at site: No      Bolus via:  Needle    Infusion Method:  Single Shot    Complications:  None  Assessment/Narrative:     Injection made incrementally with aspirations every (mL):  5     Discussed risks of nerve block, including nerve injury, bleeding, infection, incomplete analgesia.  Patient tolerated well. Incremental aspiration every 5 mL. No paresthesia, no heme. Needle tip visualized throughout with appropriate spread of local anesthetic in fasical plane.

## 2020-10-13 NOTE — BRIEF OP NOTE
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  Complications: None.  Implants: * No implants in log *

## 2020-10-13 NOTE — ANESTHESIA CARE TRANSFER NOTE
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    Anesthesia Type:   No value filed.     Note:  Airway :Face Mask  Patient transferred to:PACU  Handoff Report: Identifed the Patient, Identified the Reponsible Provider, Reviewed the pertinent medical history, Discussed the surgical course, Reviewed Intra-OP anesthesia mangement and issues during anesthesia, Set expectations for post-procedure period and Allowed opportunity for questions and acknowledgement of understanding      Vitals: (Last set prior to Anesthesia Care Transfer)    CRNA VITALS  10/13/2020 0848 - 10/13/2020 0925      10/13/2020             Pulse:  98    Ht Rate:  98    SpO2:  100 %    Resp Rate (observed):  (!) 1                Electronically Signed By: NINA Winters CRNA  October 13, 2020  9:25 AM

## 2020-10-13 NOTE — ANESTHESIA POSTPROCEDURE EVALUATION
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     Anesthesia Type: No value filed.       Disposition: Outpatient   Postop Pain Control: Uneventful            Sign Out: Well controlled pain   PONV: No   Neuro/Psych: Uneventful            Sign Out: Acceptable/Baseline neuro status   Airway/Respiratory: Uneventful            Sign Out: Acceptable/Baseline resp. status   CV/Hemodynamics: Uneventful            Sign Out: Acceptable CV status   Other NRE: NONE   DID A NON-ROUTINE EVENT OCCUR? No         Last Anesthesia Record Vitals:  CRNA VITALS  10/13/2020 0848 - 10/13/2020 0948      10/13/2020             Pulse:  98    Ht Rate:  98    SpO2:  100 %    Resp Rate (observed):  (!) 1          Last PACU Vitals:  Vitals Value Taken Time   /69 10/13/20 1015   Temp 36.2  C (97.16  F) 10/13/20 1019   Pulse 80 10/13/20 1020   Resp 18 10/13/20 1015   SpO2 96 % 10/13/20 1020   Temp src     NIBP     Pulse     SpO2     Resp     Temp     Ht Rate     Temp 2     Vitals shown include unvalidated device data.      Electronically Signed By: James Dominguez MD, 2020, 3:30 PM

## 2020-10-13 NOTE — PROGRESS NOTES
Surgical Oncology RN Care Coordination Note:     Per note from Dr. Mallory, patient to have labs drawn on Friday 10/16, requesting CBC,CMP and Pre-albumin. Orders placed and message sent to scheduling team to arrange for lab draw.     Kenzie Ace, RN, BSN  Care Coordinator   [phone]

## 2020-10-13 NOTE — OP NOTE
Patient: Nathalie Bashir  MRN: [id number]  Date of Surgery: 10/13/2020     Pre-Op Diagnosis: Gastric Cancer  Post-Op Diagnosis: Gastric Cancer     Title of Operation:  1. Diagnostic Laparoscopy  2. 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  She has undergone neoadjuvant chemotherapy and has been recovering slowly.  She has no evidence of metastatic disease on imaging and has been recommended to undergo diagnostic laparoscopy for completion of her staging.  She presents for this surgery today.     Findings: Diagnostic laparoscopy showed adhesions in the upper abdomen where the prior Whipple was performed.  The obscured viewing of most of the liver with exception of the left lateral segment.  Also obscured the right diaphragm.  There was no evidence of gross peritoneal or hepatic metastatic disease.  There was no tumor visible on the stomach.  Irrigation was performed for cytology and sent to pathology.  There was excellent hemostasis at the end of the procedure.     Description of Procedure: After appropriate informed consent was obtained, the patient was brought to the operating room where a timeout was performed to identify the correct patient, procedure, and site.  Antibiotics were administered for perioperative prophylaxis.  SCDs were placed for DVT prophylaxis.  The patient was also given chemical DVT prophylaxis in the pre-operative holding area.  An OG tube was placed for stomach decompression.  The patient was placed in suppine position with arms out to the side and was then induced under general endotracheal anesthesia.  The patient was prepped and draped in a sterile fashion.  Insufflation was established with a Veres needle in the left upper quadrant at Ken's point.  Three, 5  mm OptiView trocars were then placed under direct visualization into the abdominal cavity.  One was placed in the left upper quadrant, one in the left lower quadrant, and one in the right lower quadrant.  There were no injuries from entry.  There were adhesions in the upper abdomen from where her Whipple was performed previously.  These adhesions were too much to take down at this operation, particularly given her planned definitive resection next week.  The adhesions did obscure viewing of the right diaphragm and most of the liver with exception of the left lateral segment.  There was no evidence of gross peritoneal or hepatic metastatic disease.  There was no tumor visible on the stomach.  Irrigation (1 liter) was performed for cytology and sent to pathology.  No mobilization was performed.  There was excellent hemostasis at the end of the procedure.  The pneumoperitoneum was evacuated and the trocars removed.  The incisions were irrigated.  The skin was closed with 4-0 Monacryl and skin glue.  The patient was then extubated and taken to the PACU in stable condition.  She tolerated the procedure well and I discussed the case with the spouse over the phone.     Estimated Blood Loss: Minimal  Urine Output: See anesthesia record  Fluids: See anesthesia record  Drains: None  Specimens: Peritoneal Cytology     Disposition: PACU --> Home

## 2020-10-14 LAB — COPATH REPORT: NORMAL

## 2020-10-16 ENCOUNTER — APPOINTMENT (OUTPATIENT)
Dept: LAB | Facility: CLINIC | Age: 55
End: 2020-10-16
Attending: SURGERY
Payer: MEDICARE

## 2020-10-16 ENCOUNTER — INFUSION THERAPY VISIT (OUTPATIENT)
Dept: ONCOLOGY | Facility: CLINIC | Age: 55
End: 2020-10-16
Attending: INTERNAL MEDICINE
Payer: MEDICARE

## 2020-10-16 ENCOUNTER — TELEPHONE (OUTPATIENT)
Dept: SURGERY | Facility: CLINIC | Age: 55
End: 2020-10-16

## 2020-10-16 VITALS
RESPIRATION RATE: 16 BRPM | BODY MASS INDEX: 24.96 KG/M2 | WEIGHT: 166.6 LBS | DIASTOLIC BLOOD PRESSURE: 63 MMHG | SYSTOLIC BLOOD PRESSURE: 101 MMHG | HEART RATE: 105 BPM | TEMPERATURE: 98.6 F | OXYGEN SATURATION: 98 %

## 2020-10-16 DIAGNOSIS — D64.9 ANEMIA: ICD-10-CM

## 2020-10-16 DIAGNOSIS — D50.9 IRON DEFICIENCY ANEMIA, UNSPECIFIED IRON DEFICIENCY ANEMIA TYPE: ICD-10-CM

## 2020-10-16 DIAGNOSIS — C16.8 MALIGNANT NEOPLASM OF OVERLAPPING SITES OF STOMACH (H): Primary | ICD-10-CM

## 2020-10-16 LAB
ALBUMIN SERPL-MCNC: 2.8 G/DL (ref 3.4–5)
ALP SERPL-CCNC: 298 U/L (ref 40–150)
ALT SERPL W P-5'-P-CCNC: 49 U/L (ref 0–50)
ANION GAP SERPL CALCULATED.3IONS-SCNC: 4 MMOL/L (ref 3–14)
AST SERPL W P-5'-P-CCNC: 44 U/L (ref 0–45)
BASOPHILS # BLD AUTO: 0 10E9/L (ref 0–0.2)
BASOPHILS NFR BLD AUTO: 0.3 %
BILIRUB SERPL-MCNC: 0.4 MG/DL (ref 0.2–1.3)
BUN SERPL-MCNC: 8 MG/DL (ref 7–30)
CALCIUM SERPL-MCNC: 8.2 MG/DL (ref 8.5–10.1)
CHLORIDE SERPL-SCNC: 111 MMOL/L (ref 94–109)
CO2 SERPL-SCNC: 26 MMOL/L (ref 20–32)
CREAT SERPL-MCNC: 0.8 MG/DL (ref 0.52–1.04)
DIFFERENTIAL METHOD BLD: ABNORMAL
EOSINOPHIL # BLD AUTO: 0.3 10E9/L (ref 0–0.7)
EOSINOPHIL NFR BLD AUTO: 5.7 %
ERYTHROCYTE [DISTWIDTH] IN BLOOD BY AUTOMATED COUNT: 16.2 % (ref 10–15)
GFR SERPL CREATININE-BSD FRML MDRD: 84 ML/MIN/{1.73_M2}
GLUCOSE SERPL-MCNC: 94 MG/DL (ref 70–99)
HCT VFR BLD AUTO: 33.1 % (ref 35–47)
HGB BLD-MCNC: 9.9 G/DL (ref 11.7–15.7)
IMM GRANULOCYTES # BLD: 0 10E9/L (ref 0–0.4)
IMM GRANULOCYTES NFR BLD: 0.2 %
LYMPHOCYTES # BLD AUTO: 1.2 10E9/L (ref 0.8–5.3)
LYMPHOCYTES NFR BLD AUTO: 21.5 %
MCH RBC QN AUTO: 26.7 PG (ref 26.5–33)
MCHC RBC AUTO-ENTMCNC: 29.9 G/DL (ref 31.5–36.5)
MCV RBC AUTO: 89 FL (ref 78–100)
MONOCYTES # BLD AUTO: 0.4 10E9/L (ref 0–1.3)
MONOCYTES NFR BLD AUTO: 6.7 %
NEUTROPHILS # BLD AUTO: 3.8 10E9/L (ref 1.6–8.3)
NEUTROPHILS NFR BLD AUTO: 65.6 %
NRBC # BLD AUTO: 0 10*3/UL
NRBC BLD AUTO-RTO: 0 /100
PLATELET # BLD AUTO: 235 10E9/L (ref 150–450)
POTASSIUM SERPL-SCNC: 3.9 MMOL/L (ref 3.4–5.3)
PREALB SERPL IA-MCNC: 14 MG/DL (ref 15–45)
PROT SERPL-MCNC: 6.7 G/DL (ref 6.8–8.8)
RBC # BLD AUTO: 3.71 10E12/L (ref 3.8–5.2)
SODIUM SERPL-SCNC: 141 MMOL/L (ref 133–144)
WBC # BLD AUTO: 5.8 10E9/L (ref 4–11)

## 2020-10-16 PROCEDURE — 85025 COMPLETE CBC W/AUTO DIFF WBC: CPT | Performed by: SURGERY

## 2020-10-16 PROCEDURE — 96366 THER/PROPH/DIAG IV INF ADDON: CPT

## 2020-10-16 PROCEDURE — 96365 THER/PROPH/DIAG IV INF INIT: CPT

## 2020-10-16 PROCEDURE — 99207 PR NO BILLABLE SERVICE THIS VISIT: CPT

## 2020-10-16 PROCEDURE — 258N000003 HC RX IP 258 OP 636: Performed by: INTERNAL MEDICINE

## 2020-10-16 PROCEDURE — 80053 COMPREHEN METABOLIC PANEL: CPT | Performed by: SURGERY

## 2020-10-16 PROCEDURE — 84134 ASSAY OF PREALBUMIN: CPT | Performed by: SURGERY

## 2020-10-16 PROCEDURE — 250N000011 HC RX IP 250 OP 636: Performed by: INTERNAL MEDICINE

## 2020-10-16 RX ORDER — HEPARIN SODIUM (PORCINE) LOCK FLUSH IV SOLN 100 UNIT/ML 100 UNIT/ML
5 SOLUTION INTRAVENOUS
Status: CANCELLED | OUTPATIENT
Start: 2020-10-16

## 2020-10-16 RX ORDER — HEPARIN SODIUM,PORCINE 10 UNIT/ML
5 VIAL (ML) INTRAVENOUS
Status: CANCELLED | OUTPATIENT
Start: 2020-10-16

## 2020-10-16 RX ORDER — HEPARIN SODIUM (PORCINE) LOCK FLUSH IV SOLN 100 UNIT/ML 100 UNIT/ML
5 SOLUTION INTRAVENOUS
Status: DISCONTINUED | OUTPATIENT
Start: 2020-10-16 | End: 2020-10-16 | Stop reason: HOSPADM

## 2020-10-16 RX ORDER — HEPARIN SODIUM (PORCINE) LOCK FLUSH IV SOLN 100 UNIT/ML 100 UNIT/ML
5 SOLUTION INTRAVENOUS ONCE
Status: COMPLETED | OUTPATIENT
Start: 2020-10-16 | End: 2020-10-16

## 2020-10-16 RX ADMIN — Medication 5 ML: at 15:08

## 2020-10-16 RX ADMIN — SODIUM CHLORIDE 975 MG: 9 INJECTION, SOLUTION INTRAVENOUS at 11:13

## 2020-10-16 RX ADMIN — Medication 5 ML: at 09:14

## 2020-10-16 RX ADMIN — SODIUM CHLORIDE 25 MG: 9 INJECTION, SOLUTION INTRAVENOUS at 10:01

## 2020-10-16 RX ADMIN — SODIUM CHLORIDE 250 ML: 9 INJECTION, SOLUTION INTRAVENOUS at 09:44

## 2020-10-16 ASSESSMENT — PAIN SCALES - GENERAL: PAINLEVEL: MODERATE PAIN (4)

## 2020-10-16 NOTE — TELEPHONE ENCOUNTER
I called and had a discussion with the patient about her peritoneal cytology and results of her diagnostic laparoscopy, which came back negative for malignancy.  I discussed that her albumin is not perfect, but that it has improved to 2.8 from 2.5.  Her Hgb is stable in the high 9s and she is getting an iron infusion today.  I discussed that we are at a critical point in terms of her nutrition and the duration with which she has been off chemotherapy.  I am not sure that further delay will benefit her in terms of overall outcome.  As such, I recommended proceeding with surgery next week as planned.  This would be open subtotal versus total gastrectomy and feeding jejunostomy.  I discussed that many of the risks of this surgery are similar to her Whipple.  The main difference is there will be no risk for biliary or pancreatic leak.  However, there will be increased risk for leak from a new gastrojejunostomy or esophagojejunostomy.  I discussed that leaks from these locations can be problematic when they occur, but people are typically able to make a full recovery.  I discussed changes to eating and getting adequate nutrition after gastrectomy and that I would recommend she get a feeding tube placed at surgery due to this common issue.  I also discussed general surgery risks including death, bleeding, infection, COVID-19, pneumonia, cardiac events, stroke, renal failure, UTI, DVT/PE, damage to surrounding structures, prolonged ileus, chyle leak, etc.  She had the opportunity to ask questions and her questions were answered.  She was in agreement with and understanding of the plan.

## 2020-10-16 NOTE — NURSING NOTE
"Chief Complaint   Patient presents with     Port Draw     Labs drawn via PORT by RN. VS taken     Port accessed with 20g 3/4\" gripper needle and labs drawn by rn.  Port flushed with NS and heparin.  Pt tolerated well.  VS taken.  Pt checked in for next appt.    Mynor Higgins RN    "

## 2020-10-16 NOTE — PROGRESS NOTES
Infusion Nursing Note:  Nathalie Bashir presents today for Infed (new).    Patient seen by provider today: No   present during visit today: Not Applicable.    Note: Patient arrives feeling well. Reports ongoing PN in hands and feet that sometimes radiates up to knee. MD aware of this per pt. Has subtotal/possible total gastrectomy next week and is mentally preparing for that.    Intravenous Access:  Implanted Port.    Treatment Conditions:  Lab Results   Component Value Date    HGB 9.9 10/16/2020     Lab Results   Component Value Date    WBC 5.8 10/16/2020      Lab Results   Component Value Date    ANEU 3.8 10/16/2020     Lab Results   Component Value Date     10/16/2020      Lab Results   Component Value Date     10/16/2020                   Lab Results   Component Value Date    POTASSIUM 3.9 10/16/2020           Lab Results   Component Value Date    MAG 2.6 07/29/2020            Lab Results   Component Value Date    CR 0.80 10/16/2020                   Lab Results   Component Value Date    PETRA 8.2 10/16/2020                Lab Results   Component Value Date    BILITOTAL 0.4 10/16/2020           Lab Results   Component Value Date    ALBUMIN 2.8 10/16/2020                    Lab Results   Component Value Date    ALT 49 10/16/2020           Lab Results   Component Value Date    AST 44 10/16/2020       Results reviewed, labs MET treatment parameters, ok to proceed with treatment.    Ferritin/Iron results from 10/10/20:        Post Infusion Assessment:  Patient tolerated infusion without incident.  Patient observed for 60 minutes post test dose per protocol.  Blood return noted pre and post infusion.  Site patent and intact, free from redness, edema or discomfort.  No evidence of extravasations.  Access discontinued per protocol.       Discharge Plan:   Patient declined prescription refills.  Discharge instructions reviewed with: Patient.  Patient and/or family verbalized understanding of  discharge instructions and all questions answered.  AVS to patient via [employer].  Patient will return 11/19 for follow-up with Dr. Pollard.  Patient discharged in stable condition accompanied by: self.  Departure Mode: Ambulatory.    Marcie Quispe RN

## 2020-10-19 ENCOUNTER — PATIENT OUTREACH (OUTPATIENT)
Dept: PALLIATIVE CARE | Facility: CLINIC | Age: 55
End: 2020-10-19

## 2020-10-19 ENCOUNTER — PATIENT OUTREACH (OUTPATIENT)
Dept: ONCOLOGY | Facility: CLINIC | Age: 55
End: 2020-10-19

## 2020-10-19 DIAGNOSIS — Z11.59 ENCOUNTER FOR SCREENING FOR OTHER VIRAL DISEASES: ICD-10-CM

## 2020-10-19 DIAGNOSIS — C16.9 GASTRIC ADENOCARCINOMA (H): ICD-10-CM

## 2020-10-19 DIAGNOSIS — D70.9 NEUTROPENIC FEVER (H): ICD-10-CM

## 2020-10-19 DIAGNOSIS — R50.81 NEUTROPENIC FEVER (H): ICD-10-CM

## 2020-10-19 LAB
ALBUMIN UR-MCNC: NEGATIVE MG/DL
APPEARANCE UR: ABNORMAL
BACTERIA SPEC CULT: NORMAL
BILIRUB UR QL STRIP: NEGATIVE
COLOR UR AUTO: YELLOW
GLUCOSE UR STRIP-MCNC: NEGATIVE MG/DL
HGB UR QL STRIP: NEGATIVE
KETONES UR STRIP-MCNC: NEGATIVE MG/DL
LEUKOCYTE ESTERASE UR QL STRIP: ABNORMAL
Lab: NORMAL
MUCOUS THREADS #/AREA URNS LPF: PRESENT /LPF
NITRATE UR QL: NEGATIVE
PH UR STRIP: 7 PH (ref 5–7)
RBC #/AREA URNS AUTO: <1 /HPF (ref 0–2)
SOURCE: ABNORMAL
SP GR UR STRIP: 1.01 (ref 1–1.03)
SPECIMEN SOURCE: NORMAL
SPECIMEN SOURCE: NORMAL
SQUAMOUS #/AREA URNS AUTO: 7 /HPF (ref 0–1)
UROBILINOGEN UR STRIP-MCNC: 2 MG/DL (ref 0–2)
WBC #/AREA URNS AUTO: 14 /HPF (ref 0–5)

## 2020-10-19 PROCEDURE — U0003 INFECTIOUS AGENT DETECTION BY NUCLEIC ACID (DNA OR RNA); SEVERE ACUTE RESPIRATORY SYNDROME CORONAVIRUS 2 (SARS-COV-2) (CORONAVIRUS DISEASE [COVID-19]), AMPLIFIED PROBE TECHNIQUE, MAKING USE OF HIGH THROUGHPUT TECHNOLOGIES AS DESCRIBED BY CMS-2020-01-R: HCPCS | Performed by: PATHOLOGY

## 2020-10-19 PROCEDURE — 87086 URINE CULTURE/COLONY COUNT: CPT | Performed by: PATHOLOGY

## 2020-10-19 PROCEDURE — 87040 BLOOD CULTURE FOR BACTERIA: CPT | Performed by: PATHOLOGY

## 2020-10-19 PROCEDURE — 36415 COLL VENOUS BLD VENIPUNCTURE: CPT | Performed by: PATHOLOGY

## 2020-10-19 RX ORDER — OXYCODONE HYDROCHLORIDE 5 MG/1
5-10 TABLET ORAL 2 TIMES DAILY PRN
Qty: 8 TABLET | Refills: 0 | Status: ON HOLD | OUTPATIENT
Start: 2020-10-19 | End: 2020-10-26

## 2020-10-19 NOTE — PROGRESS NOTES
Controlled Medication Requested:  oxycodone  Last date dispensed/ sold  per  website: 10/06/2020 for 15 days   Last office visit: 08/18/2020  Next office visit:  10/29/2020  NOTES:     Recent Documentation from [place] prescription monitoring website:

## 2020-10-19 NOTE — PROGRESS NOTES
RN CARE COORDINATION NOTE      Outgoing: Spoke to Nathalie to discuss lab/UA that she had drawn today.   She reports she was at the lab for her tests for Dr. Lopez prior to her surgery this week. She is not having any symptoms that would require a UA or Blood cultures to be drawn.   Updated Kenzie Ace RNCC that patient did not have labs drawn for surgery. The labs will be drawn prior to surgery on Wed.     Melba Hernandez MSN, RN, OCN  RN Care Coordinator  [facility]  [phone]

## 2020-10-20 ENCOUNTER — ANESTHESIA EVENT (OUTPATIENT)
Dept: SURGERY | Facility: CLINIC | Age: 55
DRG: 327 | End: 2020-10-20
Payer: MEDICARE

## 2020-10-20 LAB
SARS-COV-2 RNA SPEC QL NAA+PROBE: NOT DETECTED
SPECIMEN SOURCE: NORMAL

## 2020-10-21 ENCOUNTER — ANESTHESIA (OUTPATIENT)
Dept: SURGERY | Facility: CLINIC | Age: 55
DRG: 327 | End: 2020-10-21
Payer: MEDICARE

## 2020-10-21 ENCOUNTER — HOSPITAL ENCOUNTER (INPATIENT)
Facility: CLINIC | Age: 55
LOS: 5 days | Discharge: HOME | DRG: 327 | End: 2020-10-26
Attending: SURGERY | Admitting: SURGERY
Payer: MEDICARE

## 2020-10-21 ENCOUNTER — ANCILLARY PROCEDURE (OUTPATIENT)
Dept: ULTRASOUND IMAGING | Facility: CLINIC | Age: 55
End: 2020-10-21
Payer: MEDICARE

## 2020-10-21 DIAGNOSIS — C16.8 MALIGNANT NEOPLASM OF OVERLAPPING SITES OF STOMACH (H): ICD-10-CM

## 2020-10-21 DIAGNOSIS — G89.18 POSTOPERATIVE PAIN: Primary | ICD-10-CM

## 2020-10-21 DIAGNOSIS — Z78.9 DEEP VEIN THROMBOSIS (DVT) PROPHYLAXIS PRESCRIBED AT DISCHARGE: ICD-10-CM

## 2020-10-21 LAB
ABO + RH BLD: NORMAL
ABO + RH BLD: NORMAL
ANION GAP SERPL CALCULATED.3IONS-SCNC: 8 MMOL/L (ref 3–14)
BLD GP AB SCN SERPL QL: NORMAL
BLOOD BANK CMNT PATIENT-IMP: NORMAL
BUN SERPL-MCNC: 19 MG/DL (ref 7–30)
CALCIUM SERPL-MCNC: 8 MG/DL (ref 8.5–10.1)
CHLORIDE SERPL-SCNC: 105 MMOL/L (ref 94–109)
CO2 SERPL-SCNC: 25 MMOL/L (ref 20–32)
CREAT SERPL-MCNC: 0.68 MG/DL (ref 0.52–1.04)
CREAT SERPL-MCNC: 0.73 MG/DL (ref 0.52–1.04)
ERYTHROCYTE [DISTWIDTH] IN BLOOD BY AUTOMATED COUNT: 16.9 % (ref 10–15)
GFR SERPL CREATININE-BSD FRML MDRD: >90 ML/MIN/{1.73_M2}
GFR SERPL CREATININE-BSD FRML MDRD: >90 ML/MIN/{1.73_M2}
GLUCOSE BLDC GLUCOMTR-MCNC: 186 MG/DL (ref 70–99)
GLUCOSE BLDC GLUCOMTR-MCNC: 198 MG/DL (ref 70–99)
GLUCOSE BLDC GLUCOMTR-MCNC: 99 MG/DL (ref 70–99)
GLUCOSE SERPL-MCNC: 190 MG/DL (ref 70–99)
HBA1C MFR BLD: 5.6 % (ref 0–5.6)
HCG UR QL: NEGATIVE
HCT VFR BLD AUTO: 32.8 % (ref 35–47)
HGB BLD-MCNC: 10.1 G/DL (ref 11.7–15.7)
MCH RBC QN AUTO: 26.9 PG (ref 26.5–33)
MCHC RBC AUTO-ENTMCNC: 30.8 G/DL (ref 31.5–36.5)
MCV RBC AUTO: 88 FL (ref 78–100)
PLATELET # BLD AUTO: 339 10E9/L (ref 150–450)
POTASSIUM SERPL-SCNC: 4.3 MMOL/L (ref 3.4–5.3)
RBC # BLD AUTO: 3.75 10E12/L (ref 3.8–5.2)
SODIUM SERPL-SCNC: 138 MMOL/L (ref 133–144)
SPECIMEN EXP DATE BLD: NORMAL
WBC # BLD AUTO: 11 10E9/L (ref 4–11)

## 2020-10-21 PROCEDURE — 88332 PATH CONSLTJ SURG EA ADD BLK: CPT | Mod: 26 | Performed by: PATHOLOGY

## 2020-10-21 PROCEDURE — 999N001017 HC STATISTIC GLUCOSE BY METER IP

## 2020-10-21 PROCEDURE — 36415 COLL VENOUS BLD VENIPUNCTURE: CPT | Performed by: ANESTHESIOLOGY

## 2020-10-21 PROCEDURE — 88331 PATH CONSLTJ SURG 1 BLK 1SPC: CPT | Mod: 26 | Performed by: PATHOLOGY

## 2020-10-21 PROCEDURE — 258N000003 HC RX IP 258 OP 636: Performed by: STUDENT IN AN ORGANIZED HEALTH CARE EDUCATION/TRAINING PROGRAM

## 2020-10-21 PROCEDURE — 86850 RBC ANTIBODY SCREEN: CPT | Performed by: ANESTHESIOLOGY

## 2020-10-21 PROCEDURE — 258N000003 HC RX IP 258 OP 636: Performed by: NURSE ANESTHETIST, CERTIFIED REGISTERED

## 2020-10-21 PROCEDURE — 250N000011 HC RX IP 250 OP 636: Performed by: STUDENT IN AN ORGANIZED HEALTH CARE EDUCATION/TRAINING PROGRAM

## 2020-10-21 PROCEDURE — 250N000009 HC RX 250: Performed by: ANESTHESIOLOGY

## 2020-10-21 PROCEDURE — 120N000002 HC R&B MED SURG/OB UMMC

## 2020-10-21 PROCEDURE — 07BD0ZZ EXCISION OF AORTIC LYMPHATIC, OPEN APPROACH: ICD-10-PCS | Performed by: SURGERY

## 2020-10-21 PROCEDURE — 999N000140 HC STATISTIC PRE-PROCEDURE ASSESSMENT III: Performed by: SURGERY

## 2020-10-21 PROCEDURE — 88331 PATH CONSLTJ SURG 1 BLK 1SPC: CPT | Mod: TC | Performed by: SURGERY

## 2020-10-21 PROCEDURE — 250N000011 HC RX IP 250 OP 636: Performed by: SURGERY

## 2020-10-21 PROCEDURE — 250N000009 HC RX 250: Performed by: NURSE ANESTHETIST, CERTIFIED REGISTERED

## 2020-10-21 PROCEDURE — 86901 BLOOD TYPING SEROLOGIC RH(D): CPT | Performed by: ANESTHESIOLOGY

## 2020-10-21 PROCEDURE — 88309 TISSUE EXAM BY PATHOLOGIST: CPT | Mod: 26 | Performed by: PATHOLOGY

## 2020-10-21 PROCEDURE — 80048 BASIC METABOLIC PNL TOTAL CA: CPT | Performed by: STUDENT IN AN ORGANIZED HEALTH CARE EDUCATION/TRAINING PROGRAM

## 2020-10-21 PROCEDURE — 250N000011 HC RX IP 250 OP 636: Performed by: ANESTHESIOLOGY

## 2020-10-21 PROCEDURE — 88342 IMHCHEM/IMCYTCHM 1ST ANTB: CPT | Mod: TC | Performed by: SURGERY

## 2020-10-21 PROCEDURE — 85027 COMPLETE CBC AUTOMATED: CPT | Performed by: STUDENT IN AN ORGANIZED HEALTH CARE EDUCATION/TRAINING PROGRAM

## 2020-10-21 PROCEDURE — 250N000012 HC RX MED GY IP 250 OP 636 PS 637: Performed by: STUDENT IN AN ORGANIZED HEALTH CARE EDUCATION/TRAINING PROGRAM

## 2020-10-21 PROCEDURE — 761N000002 HC RECOVERY PHASE 1 LEVEL 1 EA ADDTL HR: Performed by: SURGERY

## 2020-10-21 PROCEDURE — 370N000002 HC ANESTHESIA TECHNICAL FEE, EACH ADDTL 15 MIN: Performed by: SURGERY

## 2020-10-21 PROCEDURE — 88307 TISSUE EXAM BY PATHOLOGIST: CPT | Mod: 26 | Performed by: PATHOLOGY

## 2020-10-21 PROCEDURE — 272N000001 HC OR GENERAL SUPPLY STERILE: Performed by: SURGERY

## 2020-10-21 PROCEDURE — 761N000001 HC RECOVERY PHASE 1 LEVEL 1 FIRST HR: Performed by: SURGERY

## 2020-10-21 PROCEDURE — 43860 REVJ GSTR/JJ ANAST W/O VGTMY: CPT | Mod: 22 | Performed by: SURGERY

## 2020-10-21 PROCEDURE — 86900 BLOOD TYPING SEROLOGIC ABO: CPT | Performed by: ANESTHESIOLOGY

## 2020-10-21 PROCEDURE — 88332 PATH CONSLTJ SURG EA ADD BLK: CPT | Mod: TC | Performed by: SURGERY

## 2020-10-21 PROCEDURE — 0DBU0ZZ EXCISION OF OMENTUM, OPEN APPROACH: ICD-10-PCS | Performed by: SURGERY

## 2020-10-21 PROCEDURE — 250N000011 HC RX IP 250 OP 636: Performed by: NURSE ANESTHETIST, CERTIFIED REGISTERED

## 2020-10-21 PROCEDURE — 250N000003 HC SEVOFLURANE, EA 15 MIN: Performed by: SURGERY

## 2020-10-21 PROCEDURE — 370N000001 HC ANESTHESIA TECHNICAL FEE, 1ST 30 MIN: Performed by: SURGERY

## 2020-10-21 PROCEDURE — 83036 HEMOGLOBIN GLYCOSYLATED A1C: CPT | Performed by: STUDENT IN AN ORGANIZED HEALTH CARE EDUCATION/TRAINING PROGRAM

## 2020-10-21 PROCEDURE — 0DB60ZZ EXCISION OF STOMACH, OPEN APPROACH: ICD-10-PCS | Performed by: SURGERY

## 2020-10-21 PROCEDURE — 88304 TISSUE EXAM BY PATHOLOGIST: CPT | Mod: 26 | Performed by: PATHOLOGY

## 2020-10-21 PROCEDURE — 88309 TISSUE EXAM BY PATHOLOGIST: CPT | Mod: TC | Performed by: SURGERY

## 2020-10-21 PROCEDURE — 36592 COLLECT BLOOD FROM PICC: CPT | Performed by: STUDENT IN AN ORGANIZED HEALTH CARE EDUCATION/TRAINING PROGRAM

## 2020-10-21 PROCEDURE — 360N000029 HC SURGERY LEVEL 4 EA 15 ADDTL MIN - UMMC: Performed by: SURGERY

## 2020-10-21 PROCEDURE — 0DNW0ZZ RELEASE PERITONEUM, OPEN APPROACH: ICD-10-PCS | Performed by: SURGERY

## 2020-10-21 PROCEDURE — 88342 IMHCHEM/IMCYTCHM 1ST ANTB: CPT | Mod: 26 | Performed by: PATHOLOGY

## 2020-10-21 PROCEDURE — 88307 TISSUE EXAM BY PATHOLOGIST: CPT | Mod: TC | Performed by: SURGERY

## 2020-10-21 PROCEDURE — C9290 INJ, BUPIVACAINE LIPOSOME: HCPCS | Performed by: STUDENT IN AN ORGANIZED HEALTH CARE EDUCATION/TRAINING PROGRAM

## 2020-10-21 PROCEDURE — 81025 URINE PREGNANCY TEST: CPT | Performed by: ANESTHESIOLOGY

## 2020-10-21 PROCEDURE — 0DBA0ZZ EXCISION OF JEJUNUM, OPEN APPROACH: ICD-10-PCS | Performed by: SURGERY

## 2020-10-21 PROCEDURE — 360N000028 HC SURGERY LEVEL 4 1ST 30 MIN - UMMC: Performed by: SURGERY

## 2020-10-21 PROCEDURE — 82565 ASSAY OF CREATININE: CPT | Performed by: STUDENT IN AN ORGANIZED HEALTH CARE EDUCATION/TRAINING PROGRAM

## 2020-10-21 RX ORDER — NALOXONE HYDROCHLORIDE 0.4 MG/ML
.1-.4 INJECTION, SOLUTION INTRAMUSCULAR; INTRAVENOUS; SUBCUTANEOUS
Status: DISCONTINUED | OUTPATIENT
Start: 2020-10-21 | End: 2020-10-26 | Stop reason: HOSPADM

## 2020-10-21 RX ORDER — ONDANSETRON 4 MG/1
4 TABLET, ORALLY DISINTEGRATING ORAL EVERY 6 HOURS PRN
Status: DISCONTINUED | OUTPATIENT
Start: 2020-10-21 | End: 2020-10-26 | Stop reason: HOSPADM

## 2020-10-21 RX ORDER — CEFAZOLIN SODIUM 1 G/3ML
1 INJECTION, POWDER, FOR SOLUTION INTRAMUSCULAR; INTRAVENOUS SEE ADMIN INSTRUCTIONS
Status: DISCONTINUED | OUTPATIENT
Start: 2020-10-21 | End: 2020-10-21 | Stop reason: HOSPADM

## 2020-10-21 RX ORDER — SODIUM CHLORIDE, SODIUM LACTATE, POTASSIUM CHLORIDE, CALCIUM CHLORIDE 600; 310; 30; 20 MG/100ML; MG/100ML; MG/100ML; MG/100ML
INJECTION, SOLUTION INTRAVENOUS CONTINUOUS
Status: DISCONTINUED | OUTPATIENT
Start: 2020-10-21 | End: 2020-10-22

## 2020-10-21 RX ORDER — HYDRALAZINE HYDROCHLORIDE 20 MG/ML
2.5-5 INJECTION INTRAMUSCULAR; INTRAVENOUS EVERY 10 MIN PRN
Status: DISCONTINUED | OUTPATIENT
Start: 2020-10-21 | End: 2020-10-21 | Stop reason: HOSPADM

## 2020-10-21 RX ORDER — FLUMAZENIL 0.1 MG/ML
0.2 INJECTION, SOLUTION INTRAVENOUS
Status: DISCONTINUED | OUTPATIENT
Start: 2020-10-21 | End: 2020-10-21 | Stop reason: HOSPADM

## 2020-10-21 RX ORDER — HEPARIN SODIUM 5000 [USP'U]/.5ML
5000 INJECTION, SOLUTION INTRAVENOUS; SUBCUTANEOUS
Status: COMPLETED | OUTPATIENT
Start: 2020-10-21 | End: 2020-10-21

## 2020-10-21 RX ORDER — CITALOPRAM HYDROBROMIDE 20 MG/1
20 TABLET ORAL EVERY MORNING
Status: DISCONTINUED | OUTPATIENT
Start: 2020-10-22 | End: 2020-10-26 | Stop reason: HOSPADM

## 2020-10-21 RX ORDER — CEFAZOLIN SODIUM 2 G/100ML
2 INJECTION, SOLUTION INTRAVENOUS
Status: COMPLETED | OUTPATIENT
Start: 2020-10-21 | End: 2020-10-21

## 2020-10-21 RX ORDER — HYDROMORPHONE HYDROCHLORIDE 1 MG/ML
.3-.5 INJECTION, SOLUTION INTRAMUSCULAR; INTRAVENOUS; SUBCUTANEOUS EVERY 10 MIN PRN
Status: DISCONTINUED | OUTPATIENT
Start: 2020-10-21 | End: 2020-10-21 | Stop reason: HOSPADM

## 2020-10-21 RX ORDER — DEXTROSE MONOHYDRATE 25 G/50ML
25-50 INJECTION, SOLUTION INTRAVENOUS
Status: DISCONTINUED | OUTPATIENT
Start: 2020-10-21 | End: 2020-10-26 | Stop reason: HOSPADM

## 2020-10-21 RX ORDER — NALOXONE HYDROCHLORIDE 0.4 MG/ML
.1-.4 INJECTION, SOLUTION INTRAMUSCULAR; INTRAVENOUS; SUBCUTANEOUS
Status: DISCONTINUED | OUTPATIENT
Start: 2020-10-21 | End: 2020-10-21 | Stop reason: HOSPADM

## 2020-10-21 RX ORDER — FENTANYL CITRATE 50 UG/ML
25-50 INJECTION, SOLUTION INTRAMUSCULAR; INTRAVENOUS EVERY 5 MIN PRN
Status: DISCONTINUED | OUTPATIENT
Start: 2020-10-21 | End: 2020-10-21 | Stop reason: HOSPADM

## 2020-10-21 RX ORDER — ALBUTEROL SULFATE 0.83 MG/ML
2.5 SOLUTION RESPIRATORY (INHALATION) EVERY 4 HOURS PRN
Status: DISCONTINUED | OUTPATIENT
Start: 2020-10-21 | End: 2020-10-21 | Stop reason: HOSPADM

## 2020-10-21 RX ORDER — ONDANSETRON 2 MG/ML
INJECTION INTRAMUSCULAR; INTRAVENOUS PRN
Status: DISCONTINUED | OUTPATIENT
Start: 2020-10-21 | End: 2020-10-21

## 2020-10-21 RX ORDER — ONDANSETRON 2 MG/ML
4 INJECTION INTRAMUSCULAR; INTRAVENOUS EVERY 30 MIN PRN
Status: DISCONTINUED | OUTPATIENT
Start: 2020-10-21 | End: 2020-10-21 | Stop reason: HOSPADM

## 2020-10-21 RX ORDER — PROPOFOL 10 MG/ML
INJECTION, EMULSION INTRAVENOUS PRN
Status: DISCONTINUED | OUTPATIENT
Start: 2020-10-21 | End: 2020-10-21

## 2020-10-21 RX ORDER — FENTANYL CITRATE 50 UG/ML
INJECTION, SOLUTION INTRAMUSCULAR; INTRAVENOUS PRN
Status: DISCONTINUED | OUTPATIENT
Start: 2020-10-21 | End: 2020-10-21

## 2020-10-21 RX ORDER — NICOTINE POLACRILEX 4 MG
15-30 LOZENGE BUCCAL
Status: DISCONTINUED | OUTPATIENT
Start: 2020-10-21 | End: 2020-10-26 | Stop reason: HOSPADM

## 2020-10-21 RX ORDER — SODIUM CHLORIDE, SODIUM LACTATE, POTASSIUM CHLORIDE, CALCIUM CHLORIDE 600; 310; 30; 20 MG/100ML; MG/100ML; MG/100ML; MG/100ML
INJECTION, SOLUTION INTRAVENOUS CONTINUOUS
Status: DISCONTINUED | OUTPATIENT
Start: 2020-10-21 | End: 2020-10-21 | Stop reason: HOSPADM

## 2020-10-21 RX ORDER — SODIUM CHLORIDE, SODIUM LACTATE, POTASSIUM CHLORIDE, CALCIUM CHLORIDE 600; 310; 30; 20 MG/100ML; MG/100ML; MG/100ML; MG/100ML
INJECTION, SOLUTION INTRAVENOUS CONTINUOUS PRN
Status: DISCONTINUED | OUTPATIENT
Start: 2020-10-21 | End: 2020-10-21

## 2020-10-21 RX ORDER — SODIUM CHLORIDE 9 MG/ML
INJECTION, SOLUTION INTRAVENOUS CONTINUOUS
Status: DISCONTINUED | OUTPATIENT
Start: 2020-10-21 | End: 2020-10-22

## 2020-10-21 RX ORDER — FENTANYL CITRATE 50 UG/ML
25-50 INJECTION, SOLUTION INTRAMUSCULAR; INTRAVENOUS
Status: DISCONTINUED | OUTPATIENT
Start: 2020-10-21 | End: 2020-10-21 | Stop reason: HOSPADM

## 2020-10-21 RX ORDER — LIDOCAINE HYDROCHLORIDE 20 MG/ML
INJECTION, SOLUTION INFILTRATION; PERINEURAL PRN
Status: DISCONTINUED | OUTPATIENT
Start: 2020-10-21 | End: 2020-10-21

## 2020-10-21 RX ORDER — AMITRIPTYLINE HYDROCHLORIDE 75 MG/1
75 TABLET ORAL AT BEDTIME
Status: DISCONTINUED | OUTPATIENT
Start: 2020-10-22 | End: 2020-10-26 | Stop reason: HOSPADM

## 2020-10-21 RX ORDER — LABETALOL 20 MG/4 ML (5 MG/ML) INTRAVENOUS SYRINGE
5 EVERY 10 MIN PRN
Status: DISCONTINUED | OUTPATIENT
Start: 2020-10-21 | End: 2020-10-21 | Stop reason: HOSPADM

## 2020-10-21 RX ORDER — LIDOCAINE 40 MG/G
CREAM TOPICAL
Status: DISCONTINUED | OUTPATIENT
Start: 2020-10-21 | End: 2020-10-26 | Stop reason: HOSPADM

## 2020-10-21 RX ORDER — ONDANSETRON 2 MG/ML
4 INJECTION INTRAMUSCULAR; INTRAVENOUS EVERY 6 HOURS PRN
Status: DISCONTINUED | OUTPATIENT
Start: 2020-10-21 | End: 2020-10-26 | Stop reason: HOSPADM

## 2020-10-21 RX ORDER — DEXAMETHASONE SODIUM PHOSPHATE 10 MG/ML
INJECTION, SOLUTION INTRAMUSCULAR; INTRAVENOUS PRN
Status: DISCONTINUED | OUTPATIENT
Start: 2020-10-21 | End: 2020-10-21

## 2020-10-21 RX ORDER — BUPIVACAINE HYDROCHLORIDE 2.5 MG/ML
INJECTION, SOLUTION EPIDURAL; INFILTRATION; INTRACAUDAL PRN
Status: DISCONTINUED | OUTPATIENT
Start: 2020-10-21 | End: 2020-10-21

## 2020-10-21 RX ORDER — PHENYLEPHRINE HCL IN 0.9% NACL 50MG/250ML
0.5-6 PLASTIC BAG, INJECTION (ML) INTRAVENOUS CONTINUOUS
Status: DISCONTINUED | OUTPATIENT
Start: 2020-10-21 | End: 2020-10-21 | Stop reason: HOSPADM

## 2020-10-21 RX ORDER — ONDANSETRON 4 MG/1
4 TABLET, ORALLY DISINTEGRATING ORAL EVERY 30 MIN PRN
Status: DISCONTINUED | OUTPATIENT
Start: 2020-10-21 | End: 2020-10-21 | Stop reason: HOSPADM

## 2020-10-21 RX ORDER — LIDOCAINE 40 MG/G
CREAM TOPICAL
Status: DISCONTINUED | OUTPATIENT
Start: 2020-10-21 | End: 2020-10-21 | Stop reason: HOSPADM

## 2020-10-21 RX ADMIN — FENTANYL CITRATE 50 MCG: 50 INJECTION, SOLUTION INTRAMUSCULAR; INTRAVENOUS at 13:07

## 2020-10-21 RX ADMIN — HYDROMORPHONE HYDROCHLORIDE 0.5 MG: 1 INJECTION, SOLUTION INTRAMUSCULAR; INTRAVENOUS; SUBCUTANEOUS at 13:46

## 2020-10-21 RX ADMIN — SODIUM CHLORIDE, POTASSIUM CHLORIDE, SODIUM LACTATE AND CALCIUM CHLORIDE: 600; 310; 30; 20 INJECTION, SOLUTION INTRAVENOUS at 23:40

## 2020-10-21 RX ADMIN — PHENYLEPHRINE HYDROCHLORIDE 0.3 MCG/KG/MIN: 10 INJECTION INTRAVENOUS at 08:50

## 2020-10-21 RX ADMIN — SODIUM CHLORIDE, POTASSIUM CHLORIDE, SODIUM LACTATE AND CALCIUM CHLORIDE: 600; 310; 30; 20 INJECTION, SOLUTION INTRAVENOUS at 08:50

## 2020-10-21 RX ADMIN — CEFAZOLIN 1 G: 1 INJECTION, POWDER, FOR SOLUTION INTRAMUSCULAR; INTRAVENOUS at 12:40

## 2020-10-21 RX ADMIN — SUGAMMADEX 200 MG: 100 INJECTION, SOLUTION INTRAVENOUS at 12:54

## 2020-10-21 RX ADMIN — FENTANYL CITRATE 100 MCG: 50 INJECTION, SOLUTION INTRAMUSCULAR; INTRAVENOUS at 08:15

## 2020-10-21 RX ADMIN — INSULIN ASPART 1 UNITS: 100 INJECTION, SOLUTION INTRAVENOUS; SUBCUTANEOUS at 18:21

## 2020-10-21 RX ADMIN — BUPIVACAINE HYDROCHLORIDE 40 ML: 2.5 INJECTION, SOLUTION EPIDURAL; INFILTRATION; INTRACAUDAL; PERINEURAL at 07:44

## 2020-10-21 RX ADMIN — HYDROMORPHONE HYDROCHLORIDE 0.5 MG: 1 INJECTION, SOLUTION INTRAMUSCULAR; INTRAVENOUS; SUBCUTANEOUS at 14:24

## 2020-10-21 RX ADMIN — LIDOCAINE HYDROCHLORIDE 100 MG: 20 INJECTION, SOLUTION INFILTRATION; PERINEURAL at 08:18

## 2020-10-21 RX ADMIN — ROCURONIUM BROMIDE 20 MG: 10 INJECTION INTRAVENOUS at 09:15

## 2020-10-21 RX ADMIN — Medication 10 MG: at 14:36

## 2020-10-21 RX ADMIN — Medication 10 MG: at 15:22

## 2020-10-21 RX ADMIN — SODIUM CHLORIDE, POTASSIUM CHLORIDE, SODIUM LACTATE AND CALCIUM CHLORIDE: 600; 310; 30; 20 INJECTION, SOLUTION INTRAVENOUS at 14:08

## 2020-10-21 RX ADMIN — FENTANYL CITRATE 50 MCG: 50 INJECTION, SOLUTION INTRAMUSCULAR; INTRAVENOUS at 13:25

## 2020-10-21 RX ADMIN — Medication: at 14:09

## 2020-10-21 RX ADMIN — FENTANYL CITRATE 50 MCG: 50 INJECTION, SOLUTION INTRAMUSCULAR; INTRAVENOUS at 14:18

## 2020-10-21 RX ADMIN — HYDROMORPHONE HYDROCHLORIDE 0.5 MG: 1 INJECTION, SOLUTION INTRAMUSCULAR; INTRAVENOUS; SUBCUTANEOUS at 12:37

## 2020-10-21 RX ADMIN — MIDAZOLAM 1 MG: 1 INJECTION INTRAMUSCULAR; INTRAVENOUS at 07:49

## 2020-10-21 RX ADMIN — ROCURONIUM BROMIDE 20 MG: 10 INJECTION INTRAVENOUS at 11:04

## 2020-10-21 RX ADMIN — PROPOFOL 100 MG: 10 INJECTION, EMULSION INTRAVENOUS at 08:22

## 2020-10-21 RX ADMIN — HEPARIN SODIUM 5000 UNITS: 5000 INJECTION, SOLUTION INTRAVENOUS; SUBCUTANEOUS at 08:01

## 2020-10-21 RX ADMIN — ONDANSETRON 4 MG: 2 INJECTION INTRAMUSCULAR; INTRAVENOUS at 12:28

## 2020-10-21 RX ADMIN — SODIUM CHLORIDE, POTASSIUM CHLORIDE, SODIUM LACTATE AND CALCIUM CHLORIDE: 600; 310; 30; 20 INJECTION, SOLUTION INTRAVENOUS at 08:05

## 2020-10-21 RX ADMIN — INSULIN ASPART 2 UNITS: 100 INJECTION, SOLUTION INTRAVENOUS; SUBCUTANEOUS at 21:11

## 2020-10-21 RX ADMIN — FENTANYL CITRATE 50 MCG: 50 INJECTION, SOLUTION INTRAMUSCULAR; INTRAVENOUS at 13:15

## 2020-10-21 RX ADMIN — PHENYLEPHRINE HYDROCHLORIDE 100 MCG: 10 INJECTION INTRAVENOUS at 08:37

## 2020-10-21 RX ADMIN — FENTANYL CITRATE 100 MCG: 50 INJECTION, SOLUTION INTRAMUSCULAR; INTRAVENOUS at 08:19

## 2020-10-21 RX ADMIN — ROCURONIUM BROMIDE 20 MG: 10 INJECTION INTRAVENOUS at 10:10

## 2020-10-21 RX ADMIN — HYDROMORPHONE HYDROCHLORIDE 0.5 MG: 1 INJECTION, SOLUTION INTRAMUSCULAR; INTRAVENOUS; SUBCUTANEOUS at 12:28

## 2020-10-21 RX ADMIN — SODIUM CHLORIDE, POTASSIUM CHLORIDE, SODIUM LACTATE AND CALCIUM CHLORIDE: 600; 310; 30; 20 INJECTION, SOLUTION INTRAVENOUS at 09:06

## 2020-10-21 RX ADMIN — Medication 10 MG: at 14:53

## 2020-10-21 RX ADMIN — BUPIVACAINE 20 ML: 13.3 INJECTION, SUSPENSION, LIPOSOMAL INFILTRATION at 07:44

## 2020-10-21 RX ADMIN — Medication 2 G: at 08:40

## 2020-10-21 RX ADMIN — FENTANYL CITRATE 50 MCG: 50 INJECTION, SOLUTION INTRAMUSCULAR; INTRAVENOUS at 10:30

## 2020-10-21 RX ADMIN — FENTANYL CITRATE 50 MCG: 50 INJECTION, SOLUTION INTRAMUSCULAR; INTRAVENOUS at 07:48

## 2020-10-21 RX ADMIN — FENTANYL CITRATE 50 MCG: 50 INJECTION, SOLUTION INTRAMUSCULAR; INTRAVENOUS at 12:51

## 2020-10-21 RX ADMIN — FENTANYL CITRATE 50 MCG: 50 INJECTION, SOLUTION INTRAMUSCULAR; INTRAVENOUS at 14:04

## 2020-10-21 RX ADMIN — MIDAZOLAM 2 MG: 1 INJECTION INTRAMUSCULAR; INTRAVENOUS at 08:05

## 2020-10-21 RX ADMIN — ROCURONIUM BROMIDE 50 MG: 10 INJECTION INTRAVENOUS at 08:24

## 2020-10-21 RX ADMIN — CEFAZOLIN 1 G: 1 INJECTION, POWDER, FOR SOLUTION INTRAMUSCULAR; INTRAVENOUS at 10:40

## 2020-10-21 RX ADMIN — PHENYLEPHRINE HYDROCHLORIDE 100 MCG: 10 INJECTION INTRAVENOUS at 08:50

## 2020-10-21 RX ADMIN — DEXAMETHASONE SODIUM PHOSPHATE 6 MG: 10 INJECTION, SOLUTION INTRAMUSCULAR; INTRAVENOUS at 08:24

## 2020-10-21 RX ADMIN — HYDROMORPHONE HYDROCHLORIDE 0.5 MG: 1 INJECTION, SOLUTION INTRAMUSCULAR; INTRAVENOUS; SUBCUTANEOUS at 13:32

## 2020-10-21 ASSESSMENT — MIFFLIN-ST. JEOR: SCORE: 1381.5

## 2020-10-21 ASSESSMENT — ACTIVITIES OF DAILY LIVING (ADL): ADLS_ACUITY_SCORE: 15

## 2020-10-21 NOTE — ANESTHESIA PROCEDURE NOTES
Peripheral Nerve Block Procedure Note      Staff -   Anesthesiologist:  Morteza Newman MD  Performed By: fellow  Procedure performed by resident/CRNA in presence of a teaching physician.    Location: Pre-op  Procedure Start/Stop TImes:      10/21/2020 7:34 AM     10/21/2020 7:45 AM    patient identified, IV checked, site marked, risks and benefits discussed, informed consent, monitors and equipment checked, pre-op evaluation, at physician/surgeon's request and post-op pain management      Correct Patient: Yes      Correct Position: Yes      Correct Site: Yes      Correct Procedure: Yes      Correct Laterality:  Yes    Site Marked:  Yes  Procedure details:     Procedure:  TAP    ASA:  3    Diagnosis:  Post operative pain    Laterality:  Bilateral    Position:  Supine    Sterile Prep: chloraprep, mask and sterile gloves      Local skin infiltration:  None    Needle:  Short bevel    Needle gauge:  21    Needle length (mm):  110    Ultrasound: Yes      Ultrasound used to identify targeted nerve, plexus, or vascular structure and placed a needle adjacent to it      Permanent Image entered into patiient's record      Abnormal pain on injection: No      Blood Aspirated: No      Paresthesias:  No    Bleeding at site: No      Bolus via:  Needle    Infusion Method:  Single Shot    Complications:  None  Assessment/Narrative:     Injection made incrementally with aspirations every (mL):  5     TAP block 4 quadrant

## 2020-10-21 NOTE — OR NURSING
Patient had TAP blocks and tolerated procedure well. No compaints of ringing in the ears, metallic taste, or numbness around mouth,   Dr Colindres stated potassium from 10?16 was OK for the preop

## 2020-10-21 NOTE — ANESTHESIA PROCEDURE NOTES
Airway   Date/Time: 10/21/2020 8:25 AM   Patient location during procedure: OR    Staff -   Anesthesiologist:  Brandon Collado MD  CRNA: Lily Stern APRN CRNA  Performed By: CRNA    Consent for Airway   Urgency: elective    Indications and Patient Condition  Indications for airway management: yslvia-procedural  Induction type:intravenousMask difficulty assessment: 2 - vent by mask + OA or adjuvant +/- NMBA    Final Airway Details  Final airway type: endotracheal airway  Successful airway:ETT - single  Endotracheal Airway Details   ETT size (mm): 7.0  Cuffed: yes  Successful intubation technique: direct laryngoscopy  Grade View of Cords: 1  Adjucts: stylet  Measured from: lips  Secured at (cm): 21  Secured with: pink tape  Bite block used: Oral Airway (end of case)    Post intubation assessment   Placement verified by: capnometry, equal breath sounds and chest rise   Number of attempts at approach: 1  Secured with:pink tape  Ease of procedure: easy  Dentition: Intact and Unchanged

## 2020-10-21 NOTE — PROVIDER NOTIFICATION
Provider paged. Pt's HR running between 110-120. Pt verbalized this was her baseline. VS order parameters indicated to page if HR over 110. Is there anything you want done for this?    Addendum: Provider verbalized to continue to monitor and if change in status page again.

## 2020-10-21 NOTE — ANESTHESIA PREPROCEDURE EVALUATION
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]   Procedure(s):  Open subtotal gastectomy, possible total gastectomy, feeding jejunostomy insertion  **Latex Allergy**     LABS:  CBC:   Lab Results   Component Value Date    WBC 5.8 10/16/2020    WBC 4.5 10/10/2020    HGB 9.9 (L) 10/16/2020    HGB 9.4 (L) 10/13/2020    HCT 33.1 (L) 10/16/2020    HCT 25.4 (L) 10/10/2020     10/16/2020     10/10/2020     BMP:   Lab Results   Component Value Date     10/16/2020     10/02/2020    POTASSIUM 3.9 10/16/2020    POTASSIUM 4.2 10/02/2020    CHLORIDE 111 (H) 10/16/2020    CHLORIDE 107 10/02/2020    CO2 26 10/16/2020    CO2 26 10/02/2020    BUN 8 10/16/2020    BUN 12 10/02/2020    CR 0.80 10/16/2020    CR 0.95 10/02/2020    GLC 94 10/16/2020     (H) 10/02/2020     COAGS:   Lab Results   Component Value Date    INR 1.32 (H) 2020    FIBR 348 2020     POC:   Lab Results   Component Value Date    BGM 99 10/21/2020    HCG Negative 10/21/2020     OTHER:   Lab Results   Component Value Date    LACT 2.4 (H) 2020    PETRA 8.2 (L) 10/16/2020    PHOS 3.7 2020    MAG 2.6 (H) 2020    ALBUMIN 2.8 (L) 10/16/2020    PROTTOTAL 6.7 (L) 10/16/2020    ALT 49 10/16/2020    AST 44 10/16/2020    ALKPHOS 298 (H) 10/16/2020    BILITOTAL 0.4 10/16/2020    LIPASE 22 (L) 2020    AMYLASE 13 (L) 2020    TSH 4.16 (H) 2020    T4 1.38 2020    CRP 11.0 (H) 2020    SED 27 2020        Preop Vitals    BP Readings from Last 3 Encounters:   10/21/20 99/45   10/16/20 101/63   10/13/20 108/84    Pulse Readings from Last 3 Encounters:   10/21/20 86   10/16/20 105   10/13/20 86      Resp Readings from Last 3 Encounters:   10/21/20 10   10/16/20 16   10/13/20 16    SpO2 Readings from Last 3  "Encounters:   10/21/20 100%   10/16/20 98%   10/13/20 97%      Temp Readings from Last 1 Encounters:    Ht Readings from Last 1 Encounters:   10/21/20 1.727 m (5' 8\")      Wt Readings from Last 1 Encounters:   10/21/20 73.3 kg (161 lb 9.6 oz)    Estimated body mass index is 24.57 kg/m  as calculated from the following:    Height as of this encounter: 1.727 m (5' 8\").    Weight as of this encounter: 73.3 kg (161 lb 9.6 oz).     LDA:  Peripheral IV 10/21/20 Left Hand (Active)   Site Assessment WDL 10/21/20 0752   Line Status Saline locked 10/21/20 0752   Phlebitis Scale 0-->no symptoms 10/21/20 0752   Infiltration Scale 0 10/21/20 0752   Number of days: 0       Peripheral IV 10/21/20 Right Wrist (Active)   Number of days: 0       Port A Cath Single 03/06/20 Right Chest wall (Active)   Number of days: 229       ETT Cuffed Single 7 mm (Active)   Number of days: 0        Past Medical History:   Diagnosis Date     Allergic rhinitis      Cancer (H)     stomach cancer     Depression      Lumbago      Migraine      Other chronic pain     low back     Sacroiliitis (H)     low back pain     Trochanteric bursitis     leg length discrrepancy, hip pain      Past Surgical History:   Procedure Laterality Date     ARTHROPLASTY HIP Left 2016     BACK SURGERY      L4-5 decompression     C REPAIR DETACH RETINA,SCLERAL BUCKLE       CATARACT IOL, RT/LT       CHOLECYSTECTOMY N/A 1/28/2020    Procedure: Cholecystectomy;  Surgeon: Yandel Mallory MD;  Location: [facility]     ENDOSCOPIC RETROGRADE CHOLANGIOPANCREATOGRAM N/A 7/27/2020    Procedure: ENDOSCOPIC RETROGRADE CHOLANGIOPANCREATOGRAPHY  **Latex Allergy** with jejunal biopsies;  Surgeon: Jeet Aviles MD;  [facility]     ESOPHAGOSCOPY, GASTROSCOPY, DUODENOSCOPY (EGD), COMBINED N/A 3/3/2020    Procedure: ESOPHAGOGASTRODUODENOSCOPY, WITH ENDOSCOPIC US (enoxaparin);  Surgeon: Bakari Plata MD;  [facility]     EYE SURGERY       IR CHEST PORT PLACEMENT > 5 YRS OF AGE  " 3/26/2020     LAPAROSCOPY DIAGNOSTIC (GENERAL) N/A 10/13/2020    Procedure: Diagnostic laparoscopy with peritoneal washings  **Latex Allergy**;  Surgeon: Yandel Mallory MD;  Location: [facility]     OPEN REDUCTION INTERNAL FIXATION RODDING INTRAMEDULLARY FEMUR Left 12/23/2014    Procedure: OPEN REDUCTION INTERNAL FIXATION RODDING INTRAMEDULLARY FEMUR;  Surgeon: Arnol Santiago MD;  Location: [facility]     ORTHOPEDIC SURGERY       PICC DOUBLE LUMEN PLACEMENT Right 02/07/2020    5 fr double lumen 41 cm     REMOVE HARDWARE LOWER EXTREMITY Left 4/7/2015    Procedure: REMOVE HARDWARE LOWER EXTREMITY;  Surgeon: Arnol Santiago MD;  Location: [facility]     REMOVE HARDWARE RODDING INTRAMEDULLARY FEMUR Left 12/17/2015    Procedure: REMOVE HARDWARE RODDING INTRAMEDULLARY FEMUR;  Surgeon: Arnol Santiago MD;  [facility]     RESECT BONE LOWER EXTREMITY Left 12/23/2014    Procedure: RESECT BONE LOWER EXTREMITY;  Surgeon: Arnol Santiago MD;  Location: [facility]     WHIPPLE PROCEDURE N/A 1/28/2020    Procedure: Open Whipple procedure and Cholecystectomy;  Surgeon: Yandel Mallory MD;  Location: [facility]      Allergies   Allergen Reactions     Gluten Meal Palpitations     Augmentin Rash     Patient tolerated Zosyn in 7/2020     Oxycontin [Oxycodone]      Confusion, loopy, oxycodone is tolerated     Pregabalin      interfered with Cymbalta     Topiramate      Tongue numbness, taste alteration, irritable      Ketorolac Headache     Acetaminophen Nausea     Urine retention       Dust Mite Extract      Gabapentin Dizziness and GI Disturbance     Latex Rash     Methadone Fatigue     Mold      Naprosyn [Naproxen] Dizziness and GI Disturbance     Seasonal Allergies         Anesthesia Evaluation     . Pt has had prior anesthetic.     No history of anesthetic complications          ROS/MED HX    ENT/Pulmonary:  - neg pulmonary ROS     Neurologic:     (+)migraines,     Cardiovascular:  - neg cardiovascular ROS       METS/Exercise Tolerance:      Hematologic:     (+) Anemia, -      Musculoskeletal:  - neg musculoskeletal ROS       GI/Hepatic:     (+) GERD       Renal/Genitourinary:  - ROS Renal section negative       Endo:  - neg endo ROS       Psychiatric:     (+) psychiatric history anxiety and depression      Infectious Disease:  - neg infectious disease ROS       Malignancy:   (+) Malignancy History of GI  GI CA  Active status post Surgery and Chemo,         Other:    (+) No chance of pregnancy                        PHYSICAL EXAM:   Mental Status/Neuro: A/A/O   Airway: Facies: Feasible   Respiratory:   Resp. Effort: Normal      CV:   Rate: Age appropriate   Comments:                      Assessment:   ASA SCORE: 2    H&P: History and physical reviewed and following examination; no interval change.   Smoking Status:  Non-Smoker/Unknown   NPO Status: NPO Appropriate     Plan:   Anes. Type:  General   Pre-Medication: None   Induction:  IV (Standard)   Airway: ETT; Oral   Access/Monitoring: PIV; 2nd PIV   Maintenance: Balanced     Advanced Monitoring: BIS     Postop Plan:   Postop Pain: Opioids  Postop Sedation/Airway: Not planned  Disposition: Inpatient/Admit     PONV Management:   Adult Risk Factors: Female, Non-Smoker, Postop Opioids   Prevention: Ondansetron, Dexamethasone     CONSENT: Via Surgical Consent   Plan and risks discussed with: Patient                      Brandon Colindres MD

## 2020-10-21 NOTE — ANESTHESIA POSTPROCEDURE EVALUATION
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     Anesthesia Type: General       Disposition: Admission   Postop Pain Control: Uneventful            Sign Out: Well controlled pain   PONV: No   Neuro/Psych: Uneventful            Sign Out: Acceptable/Baseline neuro status   Airway/Respiratory: Uneventful            Sign Out: Acceptable/Baseline resp. status   CV/Hemodynamics: Uneventful            Sign Out: Acceptable CV status   Other NRE: NONE   DID A NON-ROUTINE EVENT OCCUR? No         Last Anesthesia Record Vitals:  CRNA VITALS  10/21/2020 1235 - 10/21/2020 1335      10/21/2020             NIBP:  121/65    Pulse:  112    NIBP Mean:  81    Ht Rate:  112    SpO2:  98 %          Last PACU Vitals:  Vitals Value Taken Time   /77 10/21/20 1430   Temp 37  C (98.6  F) 10/21/20 1415   Pulse 114 10/21/20 1437   Resp 16 10/21/20 1430   SpO2 98 % 10/21/20 1437   Temp src     NIBP 121/65 10/21/20 1307   Pulse 112 10/21/20 1307   SpO2 98 % 10/21/20 1307   Resp     Temp     Ht Rate 112 10/21/20 1307   Temp 2     Vitals shown include unvalidated device data.      Electronically Signed By: Brandon Colindres MD, 2020, 2:38 PM

## 2020-10-21 NOTE — PROGRESS NOTES
Admitted/transferred from: PACU  2 RN full   skin assessment completed by Beverly Quintero and Marcie Lopez.  Skin assessment finding: issues found Midline inc, JAZZMINE, port, and lap sites.   Interventions/actions: Cont good skin hygiene, mepilex on sacrum intact.      Will continue to monitor.

## 2020-10-21 NOTE — ANESTHESIA CARE TRANSFER NOTE
Patient: Nathalie Bashir    Procedure(s):  Open subtotal gastectomy with David en Y Reconstruction; D1 Lymph Node Disection  **Latex Allergy**    Diagnosis: Malignant neoplasm of overlapping sites of stomach (H) [C16.8]  Diagnosis Additional Information: No value filed.    Anesthesia Type:   General     Note:  Airway :Nasal Cannula  Patient transferred to:PACU  Comments: Alert and exchanging well. VSS. No complaints of pain or nausea. Report given to RN. Handoff Report: Identifed the Patient, Identified the Reponsible Provider, Reviewed the pertinent medical history, Discussed the surgical course, Reviewed Intra-OP anesthesia mangement and issues during anesthesia, Set expectations for post-procedure period and Allowed opportunity for questions and acknowledgement of understanding      Vitals: (Last set prior to Anesthesia Care Transfer)    CRNA VITALS  10/21/2020 1235 - 10/21/2020 1309      10/21/2020             NIBP:  121/65    Pulse:  112    NIBP Mean:  81    Ht Rate:  112    SpO2:  98 %                Electronically Signed By: NINA Gonzalez CRNA  October 21, 2020  1:09 PM

## 2020-10-21 NOTE — BRIEF OP NOTE
[facility]     Brief Operative Note    Pre-operative diagnosis: Malignant neoplasm of overlapping sites of stomach (H) [C16.8]  Post-operative diagnosis Same as pre-operative diagnosis    Procedure: Procedure(s):  1. Exploratory laparotomy   2. Lysis of adhesions  3. Open subtotal gastectomy with David en Y Reconstruction, modified D2 Lymph Node Disection      Surgeon: Surgeon(s) and Role:     * Yandel Mallory MD - Primary     * Josh Delaney MD - Resident - Assisting  Anesthesia: Combined General with Block   Estimated blood loss: 150 ml   Drains: 19 Fr JAZZMINE  Specimens:   ID Type Source Tests Collected by Time Destination   A : PARTIAL OMENECTOMY Tissue Omentum SURGICAL PATHOLOGY EXAM Yandel Mallory MD 10/21/2020  9:45 AM    B : Distal Subtotal Gastrectomy (stitch marks margin) Tissue Other SURGICAL PATHOLOGY EXAM Yandel Mallory MD 10/21/2020 10:45 AM    C : TOSHIA GASTRIC FAT Tissue Stomach, Body SURGICAL PATHOLOGY EXAM Yandel Mallory MD 10/21/2020 11:18 AM      Findings:   Frozen shows no malignany at proximal staple line. Near total gastectomy with RNY gastrojejunostomy   Complications: None.  Implants: * No implants in log *

## 2020-10-21 NOTE — PROGRESS NOTES
"  Surgical Oncology Progress Note  Surgery Cross-Cover  Post Op Check    10/21/2020    Nathalie Bashir is a 55 year old female with h/o signet ring gastric cancer now POD#0 s/p ex lap, LINA, partial omentectomy, resection of gastrojejunal anastomosis w/ en bloc subtotal gastrectomy, modified D2 lymphadenectomy, and grant-en-y gastrojejunostomy reconstruction.    Pt reports pain well controlled with PCA. Slightly tachycardic, patient reports this is her baseline. Denies palpitations, lightheadedness. Denies SOB, chest pain. No BM. Voiding via Hernandez, adequate output.    /64 (BP Location: Left arm)   Pulse 115   Temp 98.9  F (37.2  C) (Oral)   Resp 16   Ht 1.727 m (5' 8\")   Wt 73.3 kg (161 lb 9.6 oz)   LMP 09/23/2014   SpO2 95%   BMI 24.57 kg/m      Gen: A&O x3, NAD  Chest: breathing non-labored  CV: sinus tachy to 110s  Abdomen: soft, tender to palpation RLQ above JAZZMINE drain, appropriately tender around upper midline laparotomy, non-distended, no rebound/guarding  Incision: upper midline laparotomy c/d/i w/ skin glue overlying, RLQ JPx1 without erythema  Extremities: warm and well perfused    A/P: No acute post-op issues. Continue plan of care per primary team. Please call with any questions.    Fabio Patel MD  Surgery PGY-1      "

## 2020-10-21 NOTE — OP NOTE
Patient: Nathalie Bashir  MRN: [id number]  Date of Surgery: 10/21/2020     Pre-Op Diagnosis: Gastric Cancer  Post-Op Diagnosis: Gastric Cancer     Title of Operation:  1. Exploratory Laparotomy  2. Extensive Lysis of Adhesions  3. Partial Omentectomy  4. Resection of Gastrojejunal Anastomosis w/ En Bloc Subtotal Gastrectomy  5. Modified D2 Lymphadenectomy  6. David-en-Y Gastrojejunostomy Reconstruction     Anesthesia Type: General Endotracheal  Attending Physician: Yandel Mallory MD  Assisting Physician: Josh Delaney MD     Indications: 55 F w/ incidental diagnosis of signet ring gastric cancer following a Whipple procedure for a separate issue.  She has undergone neoadjuvant chemotherapy and has been recovering slowly.  She has no evidence of metastatic disease on imaging and was also found to have no obvious metastatic disease on diagnostic laparoscopy and peritoneal washings on 10/13/2020.  Given this, she has been recommended to undergo attempted curative intent resection.  She presents for this surgery today.     Findings: Exploratory laparotomy revealed no obvious evidence of metastatic disease.  There were significant upper abdominal adhesions of the omentum and transverse colon to the anterior abdominal wall and underside of the liver.  The adhesions covered the gastrojejunal anastomosis and obscured it from initial view.  Upon careful lysis of these adhesions and partial omentectomy, we were able to visualize the gastrojejunal anastomosis and encircle it.  The stomach itself showed no pathology.  It had a relatively normal feel on palpation without any masses or suspicious lesions.  There were no obviously enlarged lymph nodes.  The greater curvature fat was divided just distal to the start of the short gastric vessels and left attached to the stomach given this would be included with the specimen as lymphadenectomy.  The left gastric artery was identified and divided at its origin along with all of  the surrounding fatty tissue including potential lymph nodes.  The lesser curve fat was detached from the liver and left with the stomach as potential lymph node tissue.  The stomach was then divided along a line just distal to the short gastrics on the greater curvature and just proximal to the left gastric artery on the lesser curvature.  The staple line showed good bleeding and the remaining stomach and GE junction were pink and healthy appearing.  The subtotal gastrectomy, perigastric lymph nodes, left gastric lymph nodes, and pre-existing gastrojejunal anastomosis with short segment of jejunum was sent for permanent section.  Frozen section on the proximal staple margin of the stomach was negative for malignancy.  Given the prior gastrojejunal anastomosis had been divided to facilitate appropriate resection, we reconstructed in grant-en-Y fashion with an antecolic, retrogastric, stapled gastrojejunostomy that was patent on palpation after completion.  A side-side stapled jejunojejunostomy to reconnect the pancreatobiliary and alimentary limbs was created 60 cm downstream of the gastrojejunostomy.  The was palpably patent upon completion as well.  The mesenteric defect by the J-J was closed with 3-0 silk to prevent internal herniation.  There was excellent hemostasis at the end of the procedure.     Description of Procedure: After appropriate informed consent was obtained, the patient was brought to the operating room where a timeout was performed to identify the correct patient, procedure, and site.  Antibiotics were administered for perioperative prophylaxis.  SCDs were placed for DVT prophylaxis.  The patient was also given chemical DVT prophylaxis in the pre-operative holding area.  A Hernandez catheter was placed.  An OG tube was placed for stomach decompression.  The patient was placed in suppine position with arms out to the side and was then induced under general endotracheal anesthesia.  The patient was  prepped and draped in a sterile fashion.  An upper midline laparotomy from xyphoid to just below the umbilicus was used (previous incision site) to gain access to the peritoneal cavity.  We did this very carefully so as to avoid bowel injury upon entry.  There were significant upper abdominal adhesions of the omentum and transverse colon to the anterior abdominal wall and underside of the liver.  The adhesions covered the gastrojejunal anastomosis and obscured it from initial view.  These were carefully lysed with blunt dissection and Bovie cautery in the central abdomen and left upper quadrant.  The adhesions over the prior Whipple surgery site and in the right upper quadrant were left alone.  We did find a hard nodule in the right upper quadrant that correlated with a non-PET avid nodule on pre-operative imaging, but this turned out to be a nodule from fat necrosis.  We then performed a partial omentectomy on the left half of the transverse colon to free up the gastrojejunal anastomosis.  This was sent in several pieces as permanent specimen.  The gastrojejunal anastomosis was then visible and we were able to get around behind it and encircle it.  The jejunum was divided just proximal and distal to the anastomosis with an EndoGIA 60 mm blue staple load and the bowel segment to be resected was ligated from its mesentery along the bowel wall using LigaSure.  This allowed us access to the front and back of the stomach.  The stomach itself showed no pathology.  It had a relatively normal feel on palpation without any masses or suspicious lesions.  There were no obviously enlarged lymph nodes.  We then divided the perigastric fat along the remaining greater curvature, as lateral as possible to include lymph nodes, up to a point just distal to the short gastric vessels using LigaSure.  This perigastric fat pad and potential lymph nodes were left attached to the distal portion of stomach to be resected.  We next  dissected out and divided the perigastric fat along the lesser curvature, as medial as possible toward the liver to include lymph nodes, up to the point of the origin of the left gastric artery.  Again, this was left attached to the distal portion of stomach to be resected.  The left gastric artery and lymph node packet were then divided with a 35 mm vascular staple load and a small area was cleared on the lesser curvature for division of the stomach.  The left gastric pedicle was left attached to the distal stomach that was to be resected.  Given the prior Whipple and retroperitoneal dissection, the nodes at the celiac trunk, proximal hepatic artery, and proximal splenic artery were not easily accessible given scarring.  Nodes along the hepatic artery and the portion of the stomach that was previously resected were removed at her prior Whipple operation.  The stomach was then divided along a line just distal to the short gastrics on the greater curvature and just proximal to the left gastric artery on the lesser curvature using multiple sequential fires of the EndoGIA 60 mm black staple load.  The staple line showed good bleeding and the remaining stomach and GE junction were pink and healthy appearing.  The OG tube was easily able to pass in and out of the GE junction between the esophagus and stomach.  The subtotal gastrectomy, perigastric lymph nodes, left gastric lymph nodes, and pre-existing gastrojejunal anastomosis with short segment of jejunum was sent for permanent section.  Frozen section on the proximal staple margin of the stomach was negative for malignancy.  Given the prior gastrojejunal anastomosis had been divided to facilitate appropriate resection, we reconstructed in grant-en-Y fashion with an antecolic, retrogastric, stapled gastrojejunostomy (EndoGIA 60 mm blue load and TA 60 mm blue load to close the common channel) that was patent on palpation after completion.  Before closing the common  channel, we evacuated undigested food from the remaining stomach so that it was decompressed.  A side-side stapled jejunojejunostomy to reconnect the pancreatobiliary and alimentary limbs was created 60 cm downstream of the gastrojejunostomy (EndoGIA 60 mm blue load and TA 60 mm blue load to close the common channel).  This was palpably patent upon completion as well.  The mesenteric defect by the J-J was closed with 3-0 silk to prevent internal herniation.  Snow and directed cautery were used to obtain excellent hemostasis.  3-0 silk crotch stitches and Brolin stitches were placed on both anastomoses.  A #19 round clyde drain was brought through the right abdominal wall and positioned behind the GJ anastomosis.  This was secured to the skin with 3-0 nylon.  The abdomen was extensively irrigated.  We then opened a closing tray and changed gowns and gloves.  The midline fascia was closed with running 0 PDS.  The subcutaneous tissue was irrigated then closed with 3-0 vicryl.  This skin was closed with 4-0 monacryl and skin glue.  All instrument, sponge, and needle counts were correct at the end of the case x 2.  The patient was then extubated and taken to the PACU in stable condition.  She tolerated the procedure well and I discussed the case with the spouse over the phone.     Estimated Blood Loss: 150 mL  Urine Output: See anesthesia record  Fluids: See anesthesia record  Drains: #19 Round [place], RUQ  Specimens: Partial Omentectomy, Perigastric Fat, Distal Subtotal Gastrectomy and Gastrojejunal Anastomosis.     Disposition: PACU -->7C    MODIFIER 22: This case was very difficult given the reoperative field and resultant adhesions.  I spent more than half of the case duration performing lysis of adhesions to define the anatomy appropriately for safe resection.  This case took 50% longer as a result, and warrants the modifier.

## 2020-10-22 ENCOUNTER — APPOINTMENT (OUTPATIENT)
Dept: PHYSICAL THERAPY | Facility: CLINIC | Age: 55
DRG: 327 | End: 2020-10-22
Attending: SURGERY
Payer: MEDICARE

## 2020-10-22 LAB
ANION GAP SERPL CALCULATED.3IONS-SCNC: 6 MMOL/L (ref 3–14)
BUN SERPL-MCNC: 18 MG/DL (ref 7–30)
CALCIUM SERPL-MCNC: 8.3 MG/DL (ref 8.5–10.1)
CHLORIDE SERPL-SCNC: 102 MMOL/L (ref 94–109)
CO2 SERPL-SCNC: 28 MMOL/L (ref 20–32)
CREAT SERPL-MCNC: 0.7 MG/DL (ref 0.52–1.04)
ERYTHROCYTE [DISTWIDTH] IN BLOOD BY AUTOMATED COUNT: 17.2 % (ref 10–15)
GFR SERPL CREATININE-BSD FRML MDRD: >90 ML/MIN/{1.73_M2}
GLUCOSE BLDC GLUCOMTR-MCNC: 117 MG/DL (ref 70–99)
GLUCOSE BLDC GLUCOMTR-MCNC: 126 MG/DL (ref 70–99)
GLUCOSE BLDC GLUCOMTR-MCNC: 132 MG/DL (ref 70–99)
GLUCOSE BLDC GLUCOMTR-MCNC: 132 MG/DL (ref 70–99)
GLUCOSE BLDC GLUCOMTR-MCNC: 134 MG/DL (ref 70–99)
GLUCOSE BLDC GLUCOMTR-MCNC: 153 MG/DL (ref 70–99)
GLUCOSE SERPL-MCNC: 112 MG/DL (ref 70–99)
HCT VFR BLD AUTO: 30.7 % (ref 35–47)
HGB BLD-MCNC: 9.3 G/DL (ref 11.7–15.7)
LACTATE BLD-SCNC: 0.8 MMOL/L (ref 0.7–2)
MCH RBC QN AUTO: 27 PG (ref 26.5–33)
MCHC RBC AUTO-ENTMCNC: 30.3 G/DL (ref 31.5–36.5)
MCV RBC AUTO: 89 FL (ref 78–100)
PLATELET # BLD AUTO: 309 10E9/L (ref 150–450)
POTASSIUM SERPL-SCNC: 4.3 MMOL/L (ref 3.4–5.3)
RBC # BLD AUTO: 3.45 10E12/L (ref 3.8–5.2)
SODIUM SERPL-SCNC: 136 MMOL/L (ref 133–144)
WBC # BLD AUTO: 17.8 10E9/L (ref 4–11)

## 2020-10-22 PROCEDURE — 250N000013 HC RX MED GY IP 250 OP 250 PS 637: Performed by: STUDENT IN AN ORGANIZED HEALTH CARE EDUCATION/TRAINING PROGRAM

## 2020-10-22 PROCEDURE — C9113 INJ PANTOPRAZOLE SODIUM, VIA: HCPCS | Performed by: STUDENT IN AN ORGANIZED HEALTH CARE EDUCATION/TRAINING PROGRAM

## 2020-10-22 PROCEDURE — 97161 PT EVAL LOW COMPLEX 20 MIN: CPT | Mod: GP

## 2020-10-22 PROCEDURE — 83605 ASSAY OF LACTIC ACID: CPT | Performed by: SURGERY

## 2020-10-22 PROCEDURE — 97116 GAIT TRAINING THERAPY: CPT | Mod: GP

## 2020-10-22 PROCEDURE — 85027 COMPLETE CBC AUTOMATED: CPT | Performed by: STUDENT IN AN ORGANIZED HEALTH CARE EDUCATION/TRAINING PROGRAM

## 2020-10-22 PROCEDURE — 258N000001 HC RX 258: Performed by: SURGERY

## 2020-10-22 PROCEDURE — 250N000013 HC RX MED GY IP 250 OP 250 PS 637: Performed by: SURGERY

## 2020-10-22 PROCEDURE — 80048 BASIC METABOLIC PNL TOTAL CA: CPT | Performed by: STUDENT IN AN ORGANIZED HEALTH CARE EDUCATION/TRAINING PROGRAM

## 2020-10-22 PROCEDURE — 250N000011 HC RX IP 250 OP 636: Performed by: PHYSICIAN ASSISTANT

## 2020-10-22 PROCEDURE — 36592 COLLECT BLOOD FROM PICC: CPT | Performed by: SURGERY

## 2020-10-22 PROCEDURE — 999N001017 HC STATISTIC GLUCOSE BY METER IP

## 2020-10-22 PROCEDURE — 97530 THERAPEUTIC ACTIVITIES: CPT | Mod: GP

## 2020-10-22 PROCEDURE — 250N000011 HC RX IP 250 OP 636: Performed by: STUDENT IN AN ORGANIZED HEALTH CARE EDUCATION/TRAINING PROGRAM

## 2020-10-22 PROCEDURE — 120N000002 HC R&B MED SURG/OB UMMC

## 2020-10-22 PROCEDURE — 250N000011 HC RX IP 250 OP 636: Performed by: SURGERY

## 2020-10-22 PROCEDURE — 36592 COLLECT BLOOD FROM PICC: CPT | Performed by: STUDENT IN AN ORGANIZED HEALTH CARE EDUCATION/TRAINING PROGRAM

## 2020-10-22 RX ORDER — CALCIUM CARBONATE 500 MG/1
500 TABLET, CHEWABLE ORAL DAILY PRN
Status: DISCONTINUED | OUTPATIENT
Start: 2020-10-22 | End: 2020-10-26 | Stop reason: HOSPADM

## 2020-10-22 RX ORDER — SIMETHICONE 40MG/0.6ML
40 SUSPENSION, DROPS(FINAL DOSAGE FORM)(ML) ORAL EVERY 6 HOURS PRN
Status: DISCONTINUED | OUTPATIENT
Start: 2020-10-22 | End: 2020-10-26 | Stop reason: HOSPADM

## 2020-10-22 RX ORDER — METHOCARBAMOL 100 MG/ML
500 INJECTION, SOLUTION INTRAMUSCULAR; INTRAVENOUS EVERY 8 HOURS PRN
Status: DISCONTINUED | OUTPATIENT
Start: 2020-10-22 | End: 2020-10-23

## 2020-10-22 RX ADMIN — DEXTROSE AND SODIUM CHLORIDE: 5; 450 INJECTION, SOLUTION INTRAVENOUS at 18:35

## 2020-10-22 RX ADMIN — CITALOPRAM HYDROBROMIDE 20 MG: 20 TABLET ORAL at 09:33

## 2020-10-22 RX ADMIN — PANTOPRAZOLE SODIUM 40 MG: 40 INJECTION, POWDER, FOR SOLUTION INTRAVENOUS at 09:33

## 2020-10-22 RX ADMIN — Medication: at 11:42

## 2020-10-22 RX ADMIN — AMITRIPTYLINE HYDROCHLORIDE 75 MG: 75 TABLET, FILM COATED ORAL at 22:24

## 2020-10-22 RX ADMIN — ENOXAPARIN SODIUM 40 MG: 40 INJECTION SUBCUTANEOUS at 12:38

## 2020-10-22 RX ADMIN — CALCIUM CARBONATE (ANTACID) CHEW TAB 500 MG 500 MG: 500 CHEW TAB at 22:43

## 2020-10-22 RX ADMIN — INSULIN ASPART 1 UNITS: 100 INJECTION, SOLUTION INTRAVENOUS; SUBCUTANEOUS at 01:02

## 2020-10-22 RX ADMIN — SIMETHICONE 40 MG: 20 EMULSION ORAL at 12:39

## 2020-10-22 RX ADMIN — METHOCARBAMOL 500 MG: 100 INJECTION, SOLUTION INTRAMUSCULAR; INTRAVENOUS at 20:08

## 2020-10-22 RX ADMIN — SIMETHICONE 40 MG: 20 EMULSION ORAL at 18:34

## 2020-10-22 RX ADMIN — DEXTROSE AND SODIUM CHLORIDE: 5; 450 INJECTION, SOLUTION INTRAVENOUS at 09:38

## 2020-10-22 RX ADMIN — METHOCARBAMOL 500 MG: 100 INJECTION, SOLUTION INTRAMUSCULAR; INTRAVENOUS at 11:27

## 2020-10-22 ASSESSMENT — ACTIVITIES OF DAILY LIVING (ADL)
ADLS_ACUITY_SCORE: 15
ADLS_ACUITY_SCORE: 14
ADLS_ACUITY_SCORE: 15
ADLS_ACUITY_SCORE: 14
ADLS_ACUITY_SCORE: 15
ADLS_ACUITY_SCORE: 14

## 2020-10-22 NOTE — PROVIDER NOTIFICATION
"\"ANDREY Bashir rm 416-2 7C Surg Onc. Pt requesting simethicone for relief of \"trapped burps\" she feels in her throat. States this is what works at home. Thanks Josr [phone]\"        Paged Surgery on-call resident Dr. Patel at 0150      Provider called back at 0158 and stated that we would hold off on ordering this medication for right now.   "

## 2020-10-22 NOTE — PLAN OF CARE
"/63 (BP Location: Left arm)   Pulse 110   Temp 95.5  F (35.3  C) (Oral)   Resp 20   Ht 1.727 m (5' 8\")   Wt 73.3 kg (161 lb 9.6 oz)   LMP 09/23/2014   SpO2 93%   BMI 24.57 kg/m       Pt A&Ox4 with VSS on RA. Pt arrived to  around 1630, POD #0 d/t open subtotal gastectomy w/ David en Y reconstruction and D1 lymph node dissection. Patient dangled at bedside this evening. NPO except for medications. BG checks q 4 hours, 1U Novolog administered for 186 mg/dL and 2U Novolog administered for 198 mg/dL. Denies N/V. No flatus/BM this shift. Hernandez catheter intact and patent, cares performed this evening. JAZZMINE located in R abdomen, bloody output present. PIV x2 in R wrist and L hand, both SL. Port A Cath present in R chest wall with LR running @ 110 mL/hr continuous. Pain currently controlled with PCA Dilaudid. Continue POC.  "

## 2020-10-22 NOTE — PLAN OF CARE
Shift: [id number]    Status: s/p subtotal gastrectomy, ex lap  Neuro: Alert and oriented x4, able to make needs known, calls appropriately  Cardiac/VS: VSS, ex tachycardia  Respiratory: 1L via NC/capnography  GI: Denies N/V  Diet/Appetite: NPO  : Hernandez with adequate output   Activity: Not out of bed this shift, able to move independently in bed   Pain: Abdominal pain controlled with PCA dilaudid  Skin: No new deficits   LDA(s): PIV x2, Port w/ IVF and PCA, JAZZMINE        Plan: Continue plan of care

## 2020-10-22 NOTE — PLAN OF CARE
Tachycardic in 110s, OVSS.  NPO x meds/ice.  Incisions YOSEF, no drainage.  JAZZMINE with seosanguinous output. Pain well controlled using Dilaudid PCA and Robaxin.  Bowel sounds absent, not passing gas.  Is burping occasionally, denies nausea.  Q4 hour BGs, not meeting parameters to treat.  Hernandez removed at 1030, voided 50cc at 1300 with 16cc PVR via bladder scan.  Ambulated long distance in hallway with SBA, tolerated well.   gave Lovenox injection using proper technique.  No further reinforcement required.    SIRS triggered at 1420, LA 0.8    Continue to monitor UOP, pain control.

## 2020-10-22 NOTE — CONSULTS
[facility]  Palliative Care Consultation Note    Patient: Nathalie Bashir  Date of Admission:  10/21/2020    Requesting Clinician / Team: Surg Onc  Reason for consult: Pain management  Patient and family support    Recommendations:  Her biggest concern at this point is to be able to get back onto her oxycodone that she takes for post chemo neuropathic pain at the time of discharge.  At this point I anticipate her being discharged on oxycodone 5 mg, taken as 1 tablet up to 4 times a day as needed or 2 tablets twice daily as needed, with access to 20 mg daily.      At this time her surgery team is managing her postop pain, and it makes sense that they continue to do this.  I explained to her that her current PCA dosing is more than the equivalent of the oxycodone that she takes at home for UE post-chemo neuropathy. When switching to oral pain meds post-op, would make sense to use Oxy IR as 1st choice    We will be happy to assist with neuropathic pain management as she moves toward discharge.  We note that she has issues with gabapentin and pregabalin, as well as methadone and Oxycontin, per her allergy list.    Her disease understanding is that she has a 90% probability of being cured, and a 10% chance of recurrent disease, and that no further chemo is planned at this time.    These recommendations have been discussed with page to Surgical Oncology resident.      Thank you for the opportunity to participate in the care of this patient and family. Our team: will continue to follow.     During regular M-F work hours -- if you are not sure who specifically to contact -- please contact us by sending a text page to our team consult pager at [phone].    After regular work hours and on weekends/holidays, you can call our answering service at [phone]. Also, who's on call for us is available in [employer].   Rinku Marin MD  Palliative Medicine Consult Team  Pager:  [phone]   TT: 83 minutes, with > 50% spent in C/C/E patient/family/care teams re: GOC, POC, Sx management. [id number]    Assessments:  Nathalie Bashir is a 53yo F with gastric adenocarcinoma found during Whipple 1/2020 for presumed IPMN - no pancreatic malignancy found, but had poorly differentiated adeno ca was discovered.  Received neoadjuvant chemo (5FU, leucovorin, oxaliplatin, docetaxel) and now planned curative intent surgery with Dr. Mallory.   She suffers from chronic neck/back pain previously on opioids managed by a local pain clinic (provider was Apurva Benjamin NP in [place], prescribing oxycodone 10 mg #120/month dating back to at least 3/2019 and at some point was tried on buprenorphine).   Now back on oxycodone for BUE chemo-induced neuropathic pain. Seen in [facility]; current plan includes ongoing Oxy IR 5 mg up to 4 tablets daily.  Now POD 1 s/p gastric resection  Today, the patient was seen for:    Prognosis, Goals, & Planning:      Functional Status just prior to hospitalization: 1 (Restricted in physically strenuous activity but ambulatory and able to carry out work of a light or sedentary nature)      Prognosis, Goals, and/or Advance Care Planning were addressed today: Yes        Summary/Comments:       Patient's decision making preferences: shared with support from loved ones          Patient has decision-making capacity today for complex decisions: Yes            I have concerns about the patient/family's health literacy today: No           Patient has a completed Health Care Directive: No.       Code status: Full Code    Coping, Meaning, & Spirituality:   Mood, coping, and/or meaning in the context of serious illness were addressed today: Yes  Summary/Comments:     Social:   , medically disabled,  at bedside    History of Present Illness:  History gathered today from: patient, family/loved ones, medical chart, medical team members, unit team members  See assessment  above.    Key Palliative Symptom Data:  # Pain severity the last 12 hours: low  # Dyspnea severity the last 12 hours: low  # Nausea severity the last 12 hours: low  # Anxiety severity the last 12 hours: none    Patient is on opioids: assessed and bowels ok/no needed changes to plan of care today.    ROS:  Comprehensive ROS is reviewed and is negative except as here & per HPI:      Past Medical History:  Past Medical History:   Diagnosis Date     Allergic rhinitis      Cancer (H)     stomach cancer     Depression      Lumbago      Migraine      Other chronic pain     low back     Sacroiliitis (H)     low back pain     Trochanteric bursitis     leg length discrrepancy, hip pain        Past Surgical History:  Past Surgical History:   Procedure Laterality Date     ARTHROPLASTY HIP Left 2016     BACK SURGERY      L4-5 decompression     C REPAIR DETACH RETINA,SCLERAL BUCKLE       CATARACT IOL, RT/LT       CHOLECYSTECTOMY N/A 1/28/2020    Procedure: Cholecystectomy;  Surgeon: Yandel Mallory MD;  [facility]     ENDOSCOPIC RETROGRADE CHOLANGIOPANCREATOGRAM N/A 7/27/2020    Procedure: ENDOSCOPIC RETROGRADE CHOLANGIOPANCREATOGRAPHY  **Latex Allergy** with jejunal biopsies;  Surgeon: Jeet Aviles MD;  Location:  OR     ESOPHAGOSCOPY, GASTROSCOPY, DUODENOSCOPY (EGD), COMBINED N/A 3/3/2020    Procedure: ESOPHAGOGASTRODUODENOSCOPY, WITH ENDOSCOPIC US (enoxaparin);  Surgeon: Bakari Plata MD;  Location:  GI     EYE SURGERY       GASTRECTOMY N/A 10/21/2020    Procedure: Open subtotal gastectomy with David en Y Reconstruction; D1 Lymph Node Disection  **Latex Allergy**;  Surgeon: Yandel Mallory MD;  Location:  OR     IR CHEST PORT PLACEMENT > 5 YRS OF AGE  3/26/2020     LAPAROSCOPY DIAGNOSTIC (GENERAL) N/A 10/13/2020    Procedure: Diagnostic laparoscopy with peritoneal washings  **Latex Allergy**;  Surgeon: Yandel Mallory MD;  Location: [facility] OR     OPEN REDUCTION INTERNAL FIXATION RODDING INTRAMEDULLARY  FEMUR Left 12/23/2014    Procedure: OPEN REDUCTION INTERNAL FIXATION RODDING INTRAMEDULLARY FEMUR;  Surgeon: Arnol Santiago MD;  Location: [facility] OR     ORTHOPEDIC SURGERY       PICC DOUBLE LUMEN PLACEMENT Right 02/07/2020    5 fr double lumen 41 cm     REMOVE HARDWARE LOWER EXTREMITY Left 4/7/2015    Procedure: REMOVE HARDWARE LOWER EXTREMITY;  Surgeon: Arnol Santiago MD;  [facility]: [facility] OR     REMOVE HARDWARE RODDING INTRAMEDULLARY FEMUR Left 12/17/2015    Procedure: REMOVE HARDWARE RODDING INTRAMEDULLARY FEMUR;  Surgeon: Arnol Santiago MD;  [facility]     RESECT BONE LOWER EXTREMITY Left 12/23/2014    Procedure: RESECT BONE LOWER EXTREMITY;  Surgeon: Arnol Santiago MD;  [facility]: [facility] OR     WHIPPLE PROCEDURE N/A 1/28/2020    Procedure: Open Whipple procedure and Cholecystectomy;  Surgeon: Yandel Mallory MD;  Location: [facility] OR         Family History:  Family History   Problem Relation Age of Onset     Heart Failure Mother 77     Dementia Father         frontal lobe     No Known Problems Sister      No Known Problems Brother      Anesthesia Reaction No family hx of      Deep Vein Thrombosis No family hx of          Allergies:  Allergies   Allergen Reactions     Gluten Meal Palpitations     Augmentin Rash     Patient tolerated Zosyn in 7/2020     Oxycontin [Oxycodone]      Confusion, loopy, oxycodone is tolerated     Pregabalin      interfered with Cymbalta     Topiramate      Tongue numbness, taste alteration, irritable      Ketorolac Headache     Acetaminophen Nausea     Urine retention       Dust Mite Extract      Gabapentin Dizziness and GI Disturbance     Latex Rash     Methadone Fatigue     Mold      Naprosyn [Naproxen] Dizziness and GI Disturbance     Seasonal Allergies         Medications:  I have reviewed this patient's medication profile and medications from this hospitalization.   Hydromorphone IV prn using 6.6-7.4 mg IV last two shifts  Methocarbamol prn IV    Physical Exam:  Vital Signs:  Temp: 96  F (35.6  C) Temp src: Oral BP: 115/64 Pulse: 104   Resp: 20 SpO2: 97 % O2 Device: Nasal cannula Oxygen Delivery: 1 LPM  Weight: 161 lbs 9.55 oz  General: Awake, alert and interactive.   is at bedside with her.  Abdominal wound dressed.  ENT: Moist oral mucosa  Lungs: No increased work of breathing, able to speak in complete sentences.  CV: Regular radial pulse 96  Extremities: Bilateral lower extremity pneumatic compression for DVT prevention; no significant edema.  Data reviewed:  ROUTINE ICU LABS (Last four results)  CMP  Recent Labs   Lab 10/22/20  0724 10/21/20  1757 10/21/20  1345 10/16/20  0925     --  138 141   POTASSIUM 4.3  --  4.3 3.9   CHLORIDE 102  --  105 111*   CO2 28  --  25 26   ANIONGAP 6  --  8 4   *  --  190* 94   BUN 18  --  19 8   CR 0.70 0.68 0.73 0.80   GFRESTIMATED >90 >90 >90 84   GFRESTBLACK >90 >90 >90 >90   PETRA 8.3*  --  8.0* 8.2*   PROTTOTAL  --   --   --  6.7*   ALBUMIN  --   --   --  2.8*   BILITOTAL  --   --   --  0.4   ALKPHOS  --   --   --  298*   AST  --   --   --  44   ALT  --   --   --  49     CBC  Recent Labs   Lab 10/22/20  0724 10/21/20  1345 10/16/20  0925   WBC 17.8* 11.0 5.8   RBC 3.45* 3.75* 3.71*   HGB 9.3* 10.1* 9.9*   HCT 30.7* 32.8* 33.1*   MCV 89 88 89   MCH 27.0 26.9 26.7   MCHC 30.3* 30.8* 29.9*   RDW 17.2* 16.9* 16.2*    339 235     INRNo lab results found in last 7 days.  Arterial Blood GasNo lab results found in last 7 days.

## 2020-10-22 NOTE — PLAN OF CARE
OT7C: Hold: After discussion with physical therapy and chart review, no immediate need for two therapy disciplines. Pt familiar with abdominal precautions and is doing well with PT. Will initiate OT as need, will follow from periphery at this time.

## 2020-10-22 NOTE — PROGRESS NOTES
Surgical Oncology Progress Note    Interval History:  Stable tachycardia overnight. Pain controlled    Physical Exam:   Temp:  [95.5  F (35.3  C)-99  F (37.2  C)] 97.8  F (36.6  C)  Pulse:  [] 113  Resp:  [10-20] 20  BP: ()/(45-83) 111/64  SpO2:  [92 %-100 %] 96 %  General: Alert, oriented, appears comfortable, NAD.  Respiratory: breathing non labored  Abdomen: Abdomen is soft, non-tender, non-distended. Incision is clean, dry and intact.     Data:   All laboratory and imaging data in the past 24 hours reviewed  I/O last 3 completed shifts:  In: 2833.67 [P.O.:30; I.V.:2803.67]  Out: 2025 [Urine:1550; Drains:325; Blood:150]  Recent Labs   Lab Test 10/21/20  1345 10/16/20  0925 10/13/20  0647 10/10/20  1040 07/27/20  1015 07/27/20  1015 07/20/20  1509 07/20/20  1509 03/26/20  0705 03/26/20  0705   WBC 11.0 5.8  --  4.5   < >  --    < > 25.4*   < >  --    HGB 10.1* 9.9* 9.4* 7.7*   < >  --    < > 8.8*   < >  --     235  --  244   < >  --    < > 102*   < >  --    INR  --   --   --   --   --  1.32*  --  1.20*  --  1.04    < > = values in this interval not displayed.      Recent Labs   Lab Test 10/21/20  1757 10/21/20  1345 10/16/20  0925 10/02/20  1212   NA  --  138 141 139   POTASSIUM  --  4.3 3.9 4.2   CHLORIDE  --  105 111* 107   CO2  --  25 26 26   BUN  --  19 8 12   CR 0.68 0.73 0.80 0.95   ANIONGAP  --  8 4 6   PETRA  --  8.0* 8.2* 8.3*   GLC  --  190* 94 107*        Recent Labs   Lab Test 10/16/20  0925   PROTTOTAL 6.7*   ALBUMIN 2.8*   BILITOTAL 0.4   ALKPHOS 298*   AST 44   ALT 49       Assessment and Plan:     55yoF with a history of gastric cancer now s/p exlap, partial omentectomy, Nadine, subtotal gastrectomy, modified D2 lymphadenectomy, David-en-Y GJ    -PCA, IV robaxin  -Palliative consult per patietn request for outpatient pain med management  -Fluids to D5 1/2NS  -F/U labs  -PT  -OT  -Lovenox  -Remove arora  -Simethicone per patient request    Seen with Chief resident who will discuss with  Staff.     Kwaku Rainey MD   Surgical Oncology

## 2020-10-22 NOTE — PROGRESS NOTES
10/22/20 0900   Quick Adds   Type of Visit Initial PT Evaluation  (Starr Espino, PT, DPT )       Present no   Living Environment   People in home spouse;child(linette), adult;grandchild(linette)  (grandkids 9 years old and 11 year old )   Current Living Arrangements house  (1.5 levels with basement (avoidable) )   Home Accessibility stairs to enter home;stairs within home   Number of Stairs, Main Entrance 3  (wide steps ( FWW can fit) )   Stair Railings, Main Entrance none   Number of Stairs, Within Home, Primary other (see comments)  (full flight )   Stair Railings, Within Home, Primary none   Transportation Anticipated car, drives self;family or friend will provide   Living Environment Comments Pt lives in a story and a half with SO and son. as well as 9 year old and 11 year old grandchildren (son and grandkids will be moving out nov 1). SO works FT outside of the home. all needs can be met on main level if needed.    Self-Care   Usual Activity Tolerance good   Current Activity Tolerance fair   Regular Exercise No   Equipment Currently Used at Home walker, rolling;shower chair;grab bar, tub/shower;grab bar, toilet;raised toilet seat;cane, straight   Activity/Exercise/Self-Care Comment Pt reports mod indep with ADLs at baseline. has shower chair.    Disability/Function   Hearing Difficulty or Deaf no   Wear Glasses or Blind no   Walking or Climbing Stairs Difficulty yes   Walking or Climbing Stairs ambulation difficulty, assistance 1 person  (SPC in community )   Dressing/Bathing Difficulty yes   Dressing/Bathing bathing difficulty, requires equipment  (toilet seat, shower chair )   Dressing/Bathing Management sock aid    Toileting   (raised toilet seat)   Fall history within last six months yes   Number of times patient has fallen within last six months 2  (chemo- dizziness )   General Information   Onset of Illness/Injury or Date of Surgery 10/21/20   Referring Physician Josh Delaney MD    Patient/Family Therapy Goals Statement (PT) return home    Pertinent History of Current Problem (include personal factors and/or comorbidities that impact the POC) 55yoF with a history of gastric cancer now s/p exlap, partial omentectomy, Nadnie, subtotal gastrectomy, modified D2 lymphadenectomy, David-en-Y GJ   Existing Precautions/Restrictions fall;abdominal   General Observations activity: up with A    Cognition   Orientation Status (Cognition) oriented x 4   Affect/Mental Status (Cognition) WNL   Follows Commands (Cognition) WNL   Pain Assessment   Patient Currently in Pain No   Integumentary/Edema   Integumentary/Edema no deficits were identifed   Integumentary/Edema Comments incision intact    Posture    Posture Forward head position   Range of Motion (ROM)   ROM Quick Adds ROM WFL   Strength   Manual Muscle Testing Quick Adds Strength WFL   Strength Comments gross deconditioning, at least 3/5 per functional mobility    Bed Mobility   Comment (Bed Mobility) HOB minimally elevated, supine>sit via log roll and CGA for trunk management    Transfers   Transfer Safety Comments STS with FWW and CGA-min A    Gait/Stairs (Locomotion)   Comment (Gait/Stairs) ambulated with FWW, dec gait speed, somewhat unsteady/intermittent staggering, no overt LOB.    Balance   Balance Comments not formally assesed, pt is falls risk dt LE periopheral neuropathy    Sensory Examination   Sensory Perception Comments jose UE/LE periopheral neuropathy    Coordination   Coordination no deficits were identified   Muscle Tone   Muscle Tone no deficits were identified   Clinical Impression   Criteria for Skilled Therapeutic Intervention yes, treatment indicated   PT Diagnosis (PT) dec functional mobility    Influenced by the following impairments post op, fatigue, deconditioning   Functional limitations due to impairments bed mobility, transfers, gait, stairs, activity tolerance, balance    Clinical Presentation Evolving/Changing   Clinical  "Presentation Rationale PMH, clinical judgement, current mobility    Clinical Decision Making (Complexity) moderate complexity   Therapy Frequency (PT) 6x/week   Predicted Duration of Therapy Intervention (days/wks) days   Planned Therapy Interventions (PT) balance training;bed mobility training;gait training;home exercise program;neuromuscular re-education;patient/family education;strengthening;stair training;transfer training   Anticipated Equipment Needs at Discharge (PT)   (TBD)   Risk & Benefits of therapy have been explained evaluation/treatment results reviewed;care plan/treatment goals reviewed;risks/benefits reviewed;current/potential barriers reviewed;participants included;patient;spouse/significant other   Clinical Impression Comments PT eval completed, tx indicated    PT Discharge Planning    PT Discharge Recommendation (DC Rec) home with assist;home with home care physical therapy   PT Rationale for DC Rec Anticipate pt will progress to dc home with prn support from family. would benefitf rom continued PT to progress strength, activity tolerance and higher levle balance.    PT Brief overview of current status  SBA with FWW; abd precautions    [facility] [employer] TM \"6 Clicks\"   2016, Trustees of [facility], under license to [employer].  All rights reserved.   6 Clicks Short Forms Basic Mobility Inpatient Short Form   [facility] AM-PAC  \"6 Clicks\" V.2 Basic Mobility Inpatient Short Form   1. Turning from your back to your side while in a flat bed without using bedrails? 3 - A Little   2. Moving from lying on your back to sitting on the side of a flat bed without using bedrails? 3 - A Little   3. Moving to and from a bed to a chair (including a wheelchair)? 4 - None   4. Standing up from a chair using your arms (e.g., wheelchair, or bedside chair)? 4 - None   5. To walk in hospital room? 3 - A Little   6. Climbing 3-5 steps with a railing? 3 - A Little   Basic Mobility Raw Score (Score " out of 24.Lower scores equate to lower levels of function) 20   Total Evaluation Time   Total Evaluation Time (Minutes) 8

## 2020-10-23 ENCOUNTER — APPOINTMENT (OUTPATIENT)
Dept: PHYSICAL THERAPY | Facility: CLINIC | Age: 55
DRG: 327 | End: 2020-10-23
Attending: SURGERY
Payer: MEDICARE

## 2020-10-23 PROBLEM — D64.9 ANEMIA: Status: ACTIVE | Noted: 2020-10-23

## 2020-10-23 LAB
ANION GAP SERPL CALCULATED.3IONS-SCNC: 5 MMOL/L (ref 3–14)
BUN SERPL-MCNC: 9 MG/DL (ref 7–30)
CALCIUM SERPL-MCNC: 8 MG/DL (ref 8.5–10.1)
CHLORIDE SERPL-SCNC: 103 MMOL/L (ref 94–109)
CO2 SERPL-SCNC: 27 MMOL/L (ref 20–32)
CREAT SERPL-MCNC: 0.64 MG/DL (ref 0.52–1.04)
ERYTHROCYTE [DISTWIDTH] IN BLOOD BY AUTOMATED COUNT: 17.3 % (ref 10–15)
GFR SERPL CREATININE-BSD FRML MDRD: >90 ML/MIN/{1.73_M2}
GLUCOSE BLDC GLUCOMTR-MCNC: 101 MG/DL (ref 70–99)
GLUCOSE BLDC GLUCOMTR-MCNC: 104 MG/DL (ref 70–99)
GLUCOSE BLDC GLUCOMTR-MCNC: 104 MG/DL (ref 70–99)
GLUCOSE BLDC GLUCOMTR-MCNC: 118 MG/DL (ref 70–99)
GLUCOSE BLDC GLUCOMTR-MCNC: 96 MG/DL (ref 70–99)
GLUCOSE SERPL-MCNC: 110 MG/DL (ref 70–99)
HCT VFR BLD AUTO: 25.5 % (ref 35–47)
HGB BLD-MCNC: 7.7 G/DL (ref 11.7–15.7)
LACTATE BLD-SCNC: 0.5 MMOL/L (ref 0.7–2)
MCH RBC QN AUTO: 27.4 PG (ref 26.5–33)
MCHC RBC AUTO-ENTMCNC: 30.2 G/DL (ref 31.5–36.5)
MCV RBC AUTO: 91 FL (ref 78–100)
PLATELET # BLD AUTO: 241 10E9/L (ref 150–450)
POTASSIUM SERPL-SCNC: 3.9 MMOL/L (ref 3.4–5.3)
RBC # BLD AUTO: 2.81 10E12/L (ref 3.8–5.2)
SODIUM SERPL-SCNC: 135 MMOL/L (ref 133–144)
WBC # BLD AUTO: 15.1 10E9/L (ref 4–11)

## 2020-10-23 PROCEDURE — 999N001017 HC STATISTIC GLUCOSE BY METER IP

## 2020-10-23 PROCEDURE — 999N000111 HC STATISTIC OT IP EVAL DEFER: Performed by: OCCUPATIONAL THERAPIST

## 2020-10-23 PROCEDURE — 258N000001 HC RX 258: Performed by: STUDENT IN AN ORGANIZED HEALTH CARE EDUCATION/TRAINING PROGRAM

## 2020-10-23 PROCEDURE — 85027 COMPLETE CBC AUTOMATED: CPT | Performed by: STUDENT IN AN ORGANIZED HEALTH CARE EDUCATION/TRAINING PROGRAM

## 2020-10-23 PROCEDURE — 250N000013 HC RX MED GY IP 250 OP 250 PS 637: Performed by: STUDENT IN AN ORGANIZED HEALTH CARE EDUCATION/TRAINING PROGRAM

## 2020-10-23 PROCEDURE — 250N000011 HC RX IP 250 OP 636: Performed by: SURGERY

## 2020-10-23 PROCEDURE — 83605 ASSAY OF LACTIC ACID: CPT | Performed by: SURGERY

## 2020-10-23 PROCEDURE — 250N000013 HC RX MED GY IP 250 OP 250 PS 637: Performed by: SURGERY

## 2020-10-23 PROCEDURE — 250N000011 HC RX IP 250 OP 636: Performed by: STUDENT IN AN ORGANIZED HEALTH CARE EDUCATION/TRAINING PROGRAM

## 2020-10-23 PROCEDURE — 80048 BASIC METABOLIC PNL TOTAL CA: CPT | Performed by: STUDENT IN AN ORGANIZED HEALTH CARE EDUCATION/TRAINING PROGRAM

## 2020-10-23 PROCEDURE — 36415 COLL VENOUS BLD VENIPUNCTURE: CPT | Performed by: STUDENT IN AN ORGANIZED HEALTH CARE EDUCATION/TRAINING PROGRAM

## 2020-10-23 PROCEDURE — 120N000002 HC R&B MED SURG/OB UMMC

## 2020-10-23 PROCEDURE — 258N000001 HC RX 258: Performed by: SURGERY

## 2020-10-23 PROCEDURE — C9113 INJ PANTOPRAZOLE SODIUM, VIA: HCPCS | Performed by: STUDENT IN AN ORGANIZED HEALTH CARE EDUCATION/TRAINING PROGRAM

## 2020-10-23 PROCEDURE — 97530 THERAPEUTIC ACTIVITIES: CPT | Mod: GP

## 2020-10-23 PROCEDURE — 36592 COLLECT BLOOD FROM PICC: CPT | Performed by: SURGERY

## 2020-10-23 PROCEDURE — 250N000011 HC RX IP 250 OP 636: Performed by: PHYSICIAN ASSISTANT

## 2020-10-23 RX ORDER — CYCLOBENZAPRINE HCL 10 MG
10 TABLET ORAL EVERY 8 HOURS PRN
Status: DISCONTINUED | OUTPATIENT
Start: 2020-10-23 | End: 2020-10-26 | Stop reason: HOSPADM

## 2020-10-23 RX ORDER — FAMOTIDINE 20 MG/1
20 TABLET, FILM COATED ORAL 2 TIMES DAILY
Status: DISCONTINUED | OUTPATIENT
Start: 2020-10-23 | End: 2020-10-23

## 2020-10-23 RX ADMIN — CYCLOBENZAPRINE 10 MG: 10 TABLET, FILM COATED ORAL at 09:51

## 2020-10-23 RX ADMIN — SIMETHICONE 40 MG: 20 EMULSION ORAL at 14:26

## 2020-10-23 RX ADMIN — SIMETHICONE 40 MG: 20 EMULSION ORAL at 20:30

## 2020-10-23 RX ADMIN — ENOXAPARIN SODIUM 40 MG: 40 INJECTION SUBCUTANEOUS at 09:51

## 2020-10-23 RX ADMIN — DEXTROSE AND SODIUM CHLORIDE: 5; 450 INJECTION, SOLUTION INTRAVENOUS at 04:41

## 2020-10-23 RX ADMIN — CALCIUM CARBONATE (ANTACID) CHEW TAB 500 MG 500 MG: 500 CHEW TAB at 10:48

## 2020-10-23 RX ADMIN — CYCLOBENZAPRINE 10 MG: 10 TABLET, FILM COATED ORAL at 17:33

## 2020-10-23 RX ADMIN — AMITRIPTYLINE HYDROCHLORIDE 75 MG: 75 TABLET, FILM COATED ORAL at 21:45

## 2020-10-23 RX ADMIN — Medication: at 07:13

## 2020-10-23 RX ADMIN — DEXTROSE AND SODIUM CHLORIDE: 5; 450 INJECTION, SOLUTION INTRAVENOUS at 19:41

## 2020-10-23 RX ADMIN — SIMETHICONE 40 MG: 20 EMULSION ORAL at 08:13

## 2020-10-23 RX ADMIN — PANTOPRAZOLE SODIUM 40 MG: 40 INJECTION, POWDER, FOR SOLUTION INTRAVENOUS at 08:14

## 2020-10-23 RX ADMIN — CITALOPRAM HYDROBROMIDE 20 MG: 20 TABLET ORAL at 08:14

## 2020-10-23 ASSESSMENT — ACTIVITIES OF DAILY LIVING (ADL)
ADLS_ACUITY_SCORE: 14

## 2020-10-23 NOTE — PLAN OF CARE
Tachycardic, OVSS  Port infusing D5 1/2 NS @ 50 ml/hr, Left PIV SL  JAZZMINE with minimal output  SBA with walker  Clear liquids, tolerating well  Voids spontaneously, passing gas, no BM  Abdominal incisions, YOSEF  PCA dilaudid for pain, also PRN Flexeril  Simethicone for cramping PRN  Lovenox teaching was completed with  and patient

## 2020-10-23 NOTE — PLAN OF CARE
Shift: [id number]    Status: Post-op for subtotal gastrectomy, ex lap, Jessica En Y on 10/21  Neuro: Alert and oriented x4, able to make needs known, calls appropriately. Baseline neuropathy in fingers and feet.   Cardiac/VS: Afebrile, VSS except baseline tachycardia   Respiratory: RA  GI: Denies N/V. PRN simethicone and Tums for gas related discomfort. Passing flatus this AM.  Diet/Appetite: NPO except ice chips  : Voids spontaneously   Activity: Up w/ SBA and walker  Pain: Moderate abdominal pain controlled with ice packs and Dilaudid PCA (0.3mg q10 minutes, max dose of 1.8mg/hr)  Skin: No new deficits. Incisions YOSEF and without drainage.   LDA(s): Port infusing D5 1/2NS @ 100ml/hr and dilaudid PCA, PIV SL, JAZZMINE drain        Plan: Continue plan of care

## 2020-10-23 NOTE — PROGRESS NOTES
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  She is doing fine with this.    As per previous notes we anticipate surgery team will manage postop pain and discharge pain management as per their protocol with this type of surgery.  Will make sure she has her previously prescribed oxycodone 5 mg tablets, up to 4 tablets daily for her peripheral neuropathy.    Were happy to assist if surgical oncology team wants more input from us during her hospital course; please contact us if you do.  Rinku Marin MD  Palliative Medicine Consult Team  Pager: [phone]   TT: 21 minutes, with > 50% spent in C/C/E patient/family/care teams re: GOC, POC, Sx management.         Assessments          55 y/o woman with gastric adenocarcinoma found during Whipple done in Jan 2020 for presumed IPMN - no pancreatic malignancy found, but had poorly differentiated adenocarcinoma was discovered.  Received neoadjuvant chemo (5FU, leucovorin, oxaliplatin, docetaxel) and now is status post curative intent surgery with Dr. Mallory.   She suffers from chronic neck/back pain and was  previously on opioids managed by a local pain clinic (provider was Apurva Benjamin NP in [place], prescribing oxycodone 10 mg #120/month dating back to at least 3/2019 and at some point was tried on buprenorphine).   Now seen in [facility] clinic oxycodone for LE and UE chemo-induced neuropathic pain. Current outpatient regimen includes ongoing Oxy IR 5 mg up to 4 tablets daily.  Today, the patient was seen for: Gastric cancer, neuropathic pain upper and lower extremities.    Prognosis, Goals, or Advance Care Planning was addressed today with: No.  Anticipate discharge to home when ready  Mood, coping, and/or meaning in the context of serious  "illness were addressed today: Yes.  Summary/Comments: Addressed as noted above.            Interval History:     She has gas coming through, is ambulating, and her hydromorphone PCA is effectively managing her postoperative pain.  Key Palliative Symptoms:  We are not managing pain in this patient  We are not managing dyspnea in this patient  We are not managing nausea in this patient  We are not managing anxiety in this patient  We are assisting with management of neuropathic pain.           Review of Systems:     Besides above, an additional six-point system ROS was reviewed and is unremarkable          Medications:     I have reviewed this patient's medication profile and medications during this hospitalization.  Hydromorphone PCA, having used approximately 17.3 mg of IV hydromorphone in the past 24 hours.           Physical Exam:   Blood pressure 104/57, pulse 108, temperature 98.9  F (37.2  C), temperature source Oral, resp. rate 16, height 1.727 m (5' 8\"), weight 73.3 kg (161 lb 9.6 oz), last menstrual period 09/23/2014, SpO2 97 %, not currently breastfeeding.  General: Alert interactive able to participate in complex conversations.  Abdomen; surgical wound dressed  CV: Regular radial pulse 104  Extremities: No significant lower extremity edema.           Data Reviewed:     ROUTINE ICU LABS (Last four results)  CMP  Recent Labs   Lab 10/23/20  0637 10/22/20  0724 10/21/20  1757 10/21/20  1345    136  --  138   POTASSIUM 3.9 4.3  --  4.3   CHLORIDE 103 102  --  105   CO2 27 28  --  25   ANIONGAP 5 6  --  8   * 112*  --  190*   BUN 9 18  --  19   CR 0.64 0.70 0.68 0.73   GFRESTIMATED >90 >90 >90 >90   GFRESTBLACK >90 >90 >90 >90   PETRA 8.0* 8.3*  --  8.0*     CBC  Recent Labs   Lab 10/23/20  0637 10/22/20  0724 10/21/20  1345   WBC 15.1* 17.8* 11.0   RBC 2.81* 3.45* 3.75*   HGB 7.7* 9.3* 10.1*   HCT 25.5* 30.7* 32.8*   MCV 91 89 88   MCH 27.4 27.0 26.9   MCHC 30.2* 30.3* 30.8*   RDW 17.3* 17.2* 16.9* "    309 339     INRNo lab results found in last 7 days.  Arterial Blood GasNo lab results found in last 7 days.

## 2020-10-23 NOTE — PROGRESS NOTES
Shift: 1793-5881     Status: s/p subtotal gastrectomy, ex lap  Neuro: Alert and oriented x4, able to make needs known, calls appropriately.   Neurovascular: Baseline neuropathy with numbness and tingling in feet up to knees, fingers up to wrists.  Cardiac/VS: VSS, ex tachycardia.  Respiratory: room air, lungs clear, breathing unlabored. Using IS occasionally.  GI: bowel sounds audible. No flatus/BM this shift. Taking simethicone PRN to relieve trapped flatus.  Diet/Appetite: NPO with ice chips  : Urine output sufficient. Walking to bathroom.  Activity: Standby assist, uses walker. Walked to bathroom 2x this shift.  Pain: PCA dilaudid increased this shift to 0.3 every 10 minutes. Pain improving after increased dose per pt. Also taking robaxin PRN for abdominal pain, using ice packs on abdomen.  Skin: Incisions YOSEF, no drainage.   LDA(s): JAZZMINE in R abdomen with serosanguinous output. PIV in L wrist. R PIV leaking, painful, removed. Port w/ IVF and PCA     Plan: Continue managing pain, increasing activity as tolerated.

## 2020-10-23 NOTE — PROGRESS NOTES
"CLINICAL NUTRITION SERVICES  Reason for Assessment:  Received consult: Provider order- Nutrition Education: \"bariatric low residue diet education\"  Per chart, pt is POD2 from ex lap, partial omentectomy, LINA, subtotal gastrectomy, modified D2 lymphadenectomy, grant-en-Y GJ.  Spoke with provider this morning to clarify consult, team request pt educated on post-gastrectomy style diet (small, frequent meals; diet strategies to prevent dumping syndrome; as well as a low residue/low fiber diet)  Diet History:   Met with patient.  Pt reports eating has been going well overall since her whipple procedure (1/28/20).  Pt has not yet had education on what to eat now that is s/p subtotal gastrectomy; pt is aware though that her team wants her to eat a low fiber diet as they found lots of undigested food in her stomach during surgery.  Nutrition Diagnosis:  Food- and nutrition-related knowledge deficit r/t no previous knowledge of post gastrectomy/low fiber diet AEB patient verbalization  Interventions:  Provided instruction on diet post gastrectomy. Reviewed eating smaller more frequent meals, chewing foods well, eating high protein foods at each meal. Emphasized limiting sweets and drinking fluids between meals to avoid dumping syndrome. Discussed signs and symptoms of dumping syndrome. Discussed low fiber diet, foods to choose and foods to avoid.  Provided handouts: Eating Guidelines for Dumping Syndrome-Post Gastrectomy and Gastric Surgery Nutrition Therapy  Goals:   Patient will verbalize 3 signs of dumping syndrome   Patient will verbalize 5 foods to limit post-gastrectomy  Patient will verbalize 3 low fiber foods to choose and 3 high fiber foods to avoid  Follow-up:    Patient to ask any further nutrition-related questions before discharge.  In addition, pt may request outpatient RD appointment.    Sara Steward RD, LD  7C RD pager: [phone]               "

## 2020-10-23 NOTE — PROGRESS NOTES
Surgical Oncology Progress Note    Interval History:  Feeling better this morning.  JAZZMINE output 240 from 325.  No flatus.  Worked with PT.  Physical Exam:   Temp:  [96.7  F (35.9  C)-99.1  F (37.3  C)] 99  F (37.2  C)  Pulse:  [108-116] 116  Resp:  [14-18] 16  BP: (111-117)/(47-61) 111/47  SpO2:  [92 %-96 %] 96 %  General: Alert, oriented, appears comfortable, NAD.  Respiratory: breathing non labored  Abdomen: Abdomen is soft, non-tender, non-distended. Incision is clean, dry and intact. JAZZMINE serosanginious    Data:   All laboratory and imaging data in the past 24 hours reviewed  I/O last 3 completed shifts:  In: 1561 [P.O.:180; I.V.:1381]  Out: 2140 [Urine:1900; Drains:240]  Recent Labs   Lab Test 10/22/20  0724 10/21/20  1345 10/16/20  0925 07/27/20  1015 07/27/20  1015 07/20/20  1509 07/20/20  1509 03/26/20  0705 03/26/20  0705   WBC 17.8* 11.0 5.8   < >  --    < > 25.4*   < >  --    HGB 9.3* 10.1* 9.9*   < >  --    < > 8.8*   < >  --     339 235   < >  --    < > 102*   < >  --    INR  --   --   --   --  1.32*  --  1.20*  --  1.04    < > = values in this interval not displayed.      Recent Labs   Lab Test 10/22/20  0724 10/21/20  1757 10/21/20  1345 10/16/20  0925     --  138 141   POTASSIUM 4.3  --  4.3 3.9   CHLORIDE 102  --  105 111*   CO2 28  --  25 26   BUN 18  --  19 8   CR 0.70 0.68 0.73 0.80   ANIONGAP 6  --  8 4   PETRA 8.3*  --  8.0* 8.2*   *  --  190* 94        Recent Labs   Lab Test 10/16/20  0925   PROTTOTAL 6.7*   ALBUMIN 2.8*   BILITOTAL 0.4   ALKPHOS 298*   AST 44   ALT 49       Assessment and Plan:     55yoF with a history of gastric cancer now POD2 after exlap, partial omentectomy, Nadine, subtotal gastrectomy, modified D2 lymphadenectomy, Grant-en-Y GJ     -PCA, cyclobenzaprine  -Nutrition consult for education, will eventually need grant-en-y post-bariatric type diet  -Sips of clears, popsicles  -Fluids to 50cc/hr  -PT  -OT  -Lovenox      Seen with Chief resident who will discuss with  Staff.     Kwaku Rainey MD   Surgical Oncology

## 2020-10-23 NOTE — PLAN OF CARE
VSS except tachy in the 100-110s. Sepsis protocol triggered, lactic acid at 0.5. Infrequent coughing, pt swallows phlegm.  Pain controlled with PCA dilaudid and prn flexeril.  Incision with intact derma bond, YOSEF.  Rt JAZZMINE with serosang drainage.  Tolerating clear liquids.   +BS, +flatus, no BM yet.  Port infusing at 50cc/hr.  Up with SBA/walker.  +BS, passing flatus. Voiding adeq amount.

## 2020-10-24 LAB
ANION GAP SERPL CALCULATED.3IONS-SCNC: 6 MMOL/L (ref 3–14)
BUN SERPL-MCNC: 5 MG/DL (ref 7–30)
CALCIUM SERPL-MCNC: 8.1 MG/DL (ref 8.5–10.1)
CHLORIDE SERPL-SCNC: 105 MMOL/L (ref 94–109)
CO2 SERPL-SCNC: 28 MMOL/L (ref 20–32)
CREAT SERPL-MCNC: 0.61 MG/DL (ref 0.52–1.04)
ERYTHROCYTE [DISTWIDTH] IN BLOOD BY AUTOMATED COUNT: 17.2 % (ref 10–15)
GFR SERPL CREATININE-BSD FRML MDRD: >90 ML/MIN/{1.73_M2}
GLUCOSE BLDC GLUCOMTR-MCNC: 102 MG/DL (ref 70–99)
GLUCOSE BLDC GLUCOMTR-MCNC: 103 MG/DL (ref 70–99)
GLUCOSE BLDC GLUCOMTR-MCNC: 109 MG/DL (ref 70–99)
GLUCOSE BLDC GLUCOMTR-MCNC: 139 MG/DL (ref 70–99)
GLUCOSE BLDC GLUCOMTR-MCNC: 89 MG/DL (ref 70–99)
GLUCOSE BLDC GLUCOMTR-MCNC: 93 MG/DL (ref 70–99)
GLUCOSE SERPL-MCNC: 95 MG/DL (ref 70–99)
HCT VFR BLD AUTO: 25.5 % (ref 35–47)
HGB BLD-MCNC: 7.5 G/DL (ref 11.7–15.7)
MCH RBC QN AUTO: 26.8 PG (ref 26.5–33)
MCHC RBC AUTO-ENTMCNC: 29.4 G/DL (ref 31.5–36.5)
MCV RBC AUTO: 91 FL (ref 78–100)
PLATELET # BLD AUTO: 238 10E9/L (ref 150–450)
POTASSIUM SERPL-SCNC: 3.5 MMOL/L (ref 3.4–5.3)
RBC # BLD AUTO: 2.8 10E12/L (ref 3.8–5.2)
SODIUM SERPL-SCNC: 138 MMOL/L (ref 133–144)
WBC # BLD AUTO: 10.2 10E9/L (ref 4–11)

## 2020-10-24 PROCEDURE — 999N001017 HC STATISTIC GLUCOSE BY METER IP

## 2020-10-24 PROCEDURE — 250N000011 HC RX IP 250 OP 636: Performed by: SURGERY

## 2020-10-24 PROCEDURE — 250N000013 HC RX MED GY IP 250 OP 250 PS 637: Performed by: SURGERY

## 2020-10-24 PROCEDURE — 250N000011 HC RX IP 250 OP 636: Performed by: PHYSICIAN ASSISTANT

## 2020-10-24 PROCEDURE — 250N000011 HC RX IP 250 OP 636: Performed by: STUDENT IN AN ORGANIZED HEALTH CARE EDUCATION/TRAINING PROGRAM

## 2020-10-24 PROCEDURE — 250N000013 HC RX MED GY IP 250 OP 250 PS 637: Performed by: STUDENT IN AN ORGANIZED HEALTH CARE EDUCATION/TRAINING PROGRAM

## 2020-10-24 PROCEDURE — 120N000002 HC R&B MED SURG/OB UMMC

## 2020-10-24 PROCEDURE — 36592 COLLECT BLOOD FROM PICC: CPT | Performed by: STUDENT IN AN ORGANIZED HEALTH CARE EDUCATION/TRAINING PROGRAM

## 2020-10-24 PROCEDURE — C9113 INJ PANTOPRAZOLE SODIUM, VIA: HCPCS | Performed by: STUDENT IN AN ORGANIZED HEALTH CARE EDUCATION/TRAINING PROGRAM

## 2020-10-24 PROCEDURE — 85027 COMPLETE CBC AUTOMATED: CPT | Performed by: STUDENT IN AN ORGANIZED HEALTH CARE EDUCATION/TRAINING PROGRAM

## 2020-10-24 PROCEDURE — 80048 BASIC METABOLIC PNL TOTAL CA: CPT | Performed by: STUDENT IN AN ORGANIZED HEALTH CARE EDUCATION/TRAINING PROGRAM

## 2020-10-24 RX ORDER — OXYCODONE HYDROCHLORIDE 5 MG/1
5-10 TABLET ORAL EVERY 4 HOURS PRN
Status: DISCONTINUED | OUTPATIENT
Start: 2020-10-24 | End: 2020-10-26

## 2020-10-24 RX ORDER — HYDROMORPHONE HCL IN WATER/PF 6 MG/30 ML
.2-.4 PATIENT CONTROLLED ANALGESIA SYRINGE INTRAVENOUS EVERY 6 HOURS PRN
Status: DISCONTINUED | OUTPATIENT
Start: 2020-10-24 | End: 2020-10-26

## 2020-10-24 RX ORDER — HEPARIN SODIUM,PORCINE 10 UNIT/ML
3 VIAL (ML) INTRAVENOUS
Status: DISCONTINUED | OUTPATIENT
Start: 2020-10-24 | End: 2020-10-26 | Stop reason: HOSPADM

## 2020-10-24 RX ORDER — POLYETHYLENE GLYCOL 3350 17 G/17G
17 POWDER, FOR SOLUTION ORAL DAILY
Status: DISCONTINUED | OUTPATIENT
Start: 2020-10-24 | End: 2020-10-26 | Stop reason: HOSPADM

## 2020-10-24 RX ORDER — PANTOPRAZOLE SODIUM 40 MG/1
40 TABLET, DELAYED RELEASE ORAL
Status: DISCONTINUED | OUTPATIENT
Start: 2020-10-25 | End: 2020-10-26 | Stop reason: HOSPADM

## 2020-10-24 RX ORDER — HYDROCODONE BITARTRATE AND ACETAMINOPHEN 5; 325 MG/1; MG/1
1-2 TABLET ORAL EVERY 6 HOURS PRN
Status: DISCONTINUED | OUTPATIENT
Start: 2020-10-24 | End: 2020-10-24

## 2020-10-24 RX ADMIN — Medication: at 08:59

## 2020-10-24 RX ADMIN — HYDROMORPHONE HYDROCHLORIDE 0.2 MG: 0.2 INJECTION, SOLUTION INTRAMUSCULAR; INTRAVENOUS; SUBCUTANEOUS at 19:17

## 2020-10-24 RX ADMIN — CYCLOBENZAPRINE 10 MG: 10 TABLET, FILM COATED ORAL at 01:30

## 2020-10-24 RX ADMIN — Medication 3 ML: at 14:15

## 2020-10-24 RX ADMIN — CYCLOBENZAPRINE 10 MG: 10 TABLET, FILM COATED ORAL at 09:05

## 2020-10-24 RX ADMIN — OXYCODONE HYDROCHLORIDE 10 MG: 5 TABLET ORAL at 12:48

## 2020-10-24 RX ADMIN — PANTOPRAZOLE SODIUM 40 MG: 40 INJECTION, POWDER, FOR SOLUTION INTRAVENOUS at 07:48

## 2020-10-24 RX ADMIN — ENOXAPARIN SODIUM 40 MG: 40 INJECTION SUBCUTANEOUS at 11:04

## 2020-10-24 RX ADMIN — CYCLOBENZAPRINE 10 MG: 10 TABLET, FILM COATED ORAL at 17:34

## 2020-10-24 RX ADMIN — OXYCODONE HYDROCHLORIDE 10 MG: 5 TABLET ORAL at 20:31

## 2020-10-24 RX ADMIN — AMITRIPTYLINE HYDROCHLORIDE 75 MG: 75 TABLET, FILM COATED ORAL at 21:35

## 2020-10-24 RX ADMIN — CALCIUM CARBONATE (ANTACID) CHEW TAB 500 MG 500 MG: 500 CHEW TAB at 17:49

## 2020-10-24 RX ADMIN — OXYCODONE HYDROCHLORIDE 10 MG: 5 TABLET ORAL at 16:33

## 2020-10-24 RX ADMIN — CITALOPRAM HYDROBROMIDE 20 MG: 20 TABLET ORAL at 07:48

## 2020-10-24 RX ADMIN — SIMETHICONE 40 MG: 20 EMULSION ORAL at 07:48

## 2020-10-24 RX ADMIN — SIMETHICONE 40 MG: 20 EMULSION ORAL at 21:38

## 2020-10-24 RX ADMIN — HYDROMORPHONE HYDROCHLORIDE 0.2 MG: 0.2 INJECTION, SOLUTION INTRAMUSCULAR; INTRAVENOUS; SUBCUTANEOUS at 14:14

## 2020-10-24 ASSESSMENT — ACTIVITIES OF DAILY LIVING (ADL)
ADLS_ACUITY_SCORE: 14

## 2020-10-24 NOTE — PLAN OF CARE
Assumed care from 1500-1900H     Alert and oriented, pain managed with prn oxycodone, flexeril and tums. + gas, hypoactive bowel sounds, no BM yet. OVSS, afebrile.  Midline surgical incision dermabonded, CDI/YOSEF with abdominal binder. Tolerating full liquid diet, denies nausea and vomiting. Voiding spontaneously with adequate urine volume.   PLAN: Continue with the care plan. Pain management, pulmonary toileting.

## 2020-10-24 NOTE — PROGRESS NOTES
Surgical Oncology Progress Note    Interval History:  Passing gas, tolerating clears. No nausea or emesis. Spontaneously voiding     Physical Exam:   Temp:  [97.2  F (36.2  C)-99.5  F (37.5  C)] 97.2  F (36.2  C)  Pulse:  [102-115] 115  Resp:  [16-17] 16  BP: (104-112)/(47-64) 110/60  SpO2:  [93 %-97 %] 95 %  General: Alert, oriented, appears comfortable, NAD.  Respiratory: breathing non labored  Abdomen: Abdomen is soft, non-tender, non-distended. Incision is clean, dry and intact. JAZZMINE serosanginious    Data:   All laboratory and imaging data in the past 24 hours reviewed  I/O last 3 completed shifts:  In: 2623.33 [P.O.:960; I.V.:1663.33]  Out: 2275 [Urine:2050; Drains:225]  Recent Labs   Lab Test 10/24/20  0731 10/23/20  0637 10/22/20  0724 07/27/20  1015 07/27/20  1015 07/20/20  1509 07/20/20  1509 03/26/20  0705 03/26/20  0705   WBC 10.2 15.1* 17.8*   < >  --    < > 25.4*   < >  --    HGB 7.5* 7.7* 9.3*   < >  --    < > 8.8*   < >  --     241 309   < >  --    < > 102*   < >  --    INR  --   --   --   --  1.32*  --  1.20*  --  1.04    < > = values in this interval not displayed.      Recent Labs   Lab Test 10/24/20  0731 10/23/20  0637 10/22/20  0724    135 136   POTASSIUM 3.5 3.9 4.3   CHLORIDE 105 103 102   CO2 28 27 28   BUN 5* 9 18   CR 0.61 0.64 0.70   ANIONGAP 6 5 6   PETRA 8.1* 8.0* 8.3*   GLC 95 110* 112*        Recent Labs   Lab Test 10/16/20  0925   PROTTOTAL 6.7*   ALBUMIN 2.8*   BILITOTAL 0.4   ALKPHOS 298*   AST 44   ALT 49       Assessment and Plan:     55yoF with a history of gastric cancer now POD3 after exlap, partial omentectomy, Nadine, subtotal gastrectomy, modified D2 lymphadenectomy, Grant-en-Y GJ     -PO oxycodone , cyclobenzaprine  -Nutrition consult for education, will eventually need grant-en-y post-bariatric type diet  -Full liquid diet , boost, miralax   - discontinue fluids  -PT  -OT  -Lovenox  -Will remove JAZZMINE tomorrow       Josh Delaney MD   Surgical Oncology

## 2020-10-24 NOTE — PLAN OF CARE
Tachycardic; OVSS. JAZZMINE has minimal output. PCA dilaudid was discontinued today. PRN oxycodone and IV dilaudid (for breakthrough pain). Port and Left PIV are saline locked. Abdominal incision is YOSEF and is covered with abdominal binder. SBA with walker. Full liquid diet, tolerating fair, is gluten free so hard to get full liquids that have no gluten. Voids spontaneously, passing gas, no BM. Denies nausea.

## 2020-10-24 NOTE — PLAN OF CARE
Pt AVSS. Pt sleeping between cares. Up to BR with SBA and walker. Voiding good amts. IVF at 50/hr. Pain managed with flexeril and dilaudid pca at 0.3mg q 10 min. Abd dist, +bs, midline and lap sites dermabond intact. Right JAZZMINE with serous output. Lungs clear, dim in bases. IS =700ml. BS checks q 2tf=580, 102 no s/s coverage needed. Cont to maintain pain control. Monitor bowel fxn.

## 2020-10-25 LAB
ANION GAP SERPL CALCULATED.3IONS-SCNC: 4 MMOL/L (ref 3–14)
BUN SERPL-MCNC: 4 MG/DL (ref 7–30)
CALCIUM SERPL-MCNC: 8 MG/DL (ref 8.5–10.1)
CHLORIDE SERPL-SCNC: 105 MMOL/L (ref 94–109)
CO2 SERPL-SCNC: 30 MMOL/L (ref 20–32)
CREAT SERPL-MCNC: 0.69 MG/DL (ref 0.52–1.04)
ERYTHROCYTE [DISTWIDTH] IN BLOOD BY AUTOMATED COUNT: 17.7 % (ref 10–15)
GFR SERPL CREATININE-BSD FRML MDRD: >90 ML/MIN/{1.73_M2}
GLUCOSE BLDC GLUCOMTR-MCNC: 100 MG/DL (ref 70–99)
GLUCOSE BLDC GLUCOMTR-MCNC: 167 MG/DL (ref 70–99)
GLUCOSE BLDC GLUCOMTR-MCNC: 94 MG/DL (ref 70–99)
GLUCOSE SERPL-MCNC: 86 MG/DL (ref 70–99)
HCT VFR BLD AUTO: 25.7 % (ref 35–47)
HGB BLD-MCNC: 7.6 G/DL (ref 11.7–15.7)
MCH RBC QN AUTO: 27 PG (ref 26.5–33)
MCHC RBC AUTO-ENTMCNC: 29.6 G/DL (ref 31.5–36.5)
MCV RBC AUTO: 91 FL (ref 78–100)
PLATELET # BLD AUTO: 286 10E9/L (ref 150–450)
POTASSIUM SERPL-SCNC: 3.3 MMOL/L (ref 3.4–5.3)
RBC # BLD AUTO: 2.82 10E12/L (ref 3.8–5.2)
SODIUM SERPL-SCNC: 139 MMOL/L (ref 133–144)
WBC # BLD AUTO: 7.3 10E9/L (ref 4–11)

## 2020-10-25 PROCEDURE — 85027 COMPLETE CBC AUTOMATED: CPT | Performed by: STUDENT IN AN ORGANIZED HEALTH CARE EDUCATION/TRAINING PROGRAM

## 2020-10-25 PROCEDURE — 250N000013 HC RX MED GY IP 250 OP 250 PS 637: Performed by: STUDENT IN AN ORGANIZED HEALTH CARE EDUCATION/TRAINING PROGRAM

## 2020-10-25 PROCEDURE — 250N000011 HC RX IP 250 OP 636: Performed by: STUDENT IN AN ORGANIZED HEALTH CARE EDUCATION/TRAINING PROGRAM

## 2020-10-25 PROCEDURE — 250N000013 HC RX MED GY IP 250 OP 250 PS 637: Performed by: SURGERY

## 2020-10-25 PROCEDURE — 250N000011 HC RX IP 250 OP 636: Performed by: PHYSICIAN ASSISTANT

## 2020-10-25 PROCEDURE — 80048 BASIC METABOLIC PNL TOTAL CA: CPT | Performed by: STUDENT IN AN ORGANIZED HEALTH CARE EDUCATION/TRAINING PROGRAM

## 2020-10-25 PROCEDURE — 120N000002 HC R&B MED SURG/OB UMMC

## 2020-10-25 PROCEDURE — 999N001017 HC STATISTIC GLUCOSE BY METER IP

## 2020-10-25 PROCEDURE — 36592 COLLECT BLOOD FROM PICC: CPT | Performed by: STUDENT IN AN ORGANIZED HEALTH CARE EDUCATION/TRAINING PROGRAM

## 2020-10-25 RX ORDER — KETOROLAC TROMETHAMINE 15 MG/ML
15 INJECTION, SOLUTION INTRAMUSCULAR; INTRAVENOUS EVERY 6 HOURS
Status: DISCONTINUED | OUTPATIENT
Start: 2020-10-25 | End: 2020-10-26

## 2020-10-25 RX ORDER — POTASSIUM CHLORIDE 20MEQ/15ML
20 LIQUID (ML) ORAL DAILY
Status: DISCONTINUED | OUTPATIENT
Start: 2020-10-25 | End: 2020-10-26 | Stop reason: HOSPADM

## 2020-10-25 RX ADMIN — OXYCODONE HYDROCHLORIDE 10 MG: 5 TABLET ORAL at 17:36

## 2020-10-25 RX ADMIN — HYDROMORPHONE HYDROCHLORIDE 0.4 MG: 0.2 INJECTION, SOLUTION INTRAMUSCULAR; INTRAVENOUS; SUBCUTANEOUS at 13:58

## 2020-10-25 RX ADMIN — OXYCODONE HYDROCHLORIDE 10 MG: 5 TABLET ORAL at 12:53

## 2020-10-25 RX ADMIN — PANTOPRAZOLE SODIUM 40 MG: 40 TABLET, DELAYED RELEASE ORAL at 07:54

## 2020-10-25 RX ADMIN — OXYCODONE HYDROCHLORIDE 10 MG: 5 TABLET ORAL at 21:50

## 2020-10-25 RX ADMIN — HYDROMORPHONE HYDROCHLORIDE 0.4 MG: 0.2 INJECTION, SOLUTION INTRAMUSCULAR; INTRAVENOUS; SUBCUTANEOUS at 20:22

## 2020-10-25 RX ADMIN — HYDROMORPHONE HYDROCHLORIDE 0.4 MG: 0.2 INJECTION, SOLUTION INTRAMUSCULAR; INTRAVENOUS; SUBCUTANEOUS at 07:56

## 2020-10-25 RX ADMIN — SIMETHICONE 40 MG: 20 EMULSION ORAL at 07:54

## 2020-10-25 RX ADMIN — CALCIUM CARBONATE (ANTACID) CHEW TAB 500 MG 500 MG: 500 CHEW TAB at 22:33

## 2020-10-25 RX ADMIN — OXYCODONE HYDROCHLORIDE 10 MG: 5 TABLET ORAL at 05:20

## 2020-10-25 RX ADMIN — CITALOPRAM HYDROBROMIDE 20 MG: 20 TABLET ORAL at 07:54

## 2020-10-25 RX ADMIN — AMITRIPTYLINE HYDROCHLORIDE 75 MG: 75 TABLET, FILM COATED ORAL at 21:50

## 2020-10-25 RX ADMIN — SIMETHICONE 40 MG: 20 EMULSION ORAL at 17:36

## 2020-10-25 RX ADMIN — KETOROLAC TROMETHAMINE 15 MG: 15 INJECTION, SOLUTION INTRAMUSCULAR; INTRAVENOUS at 10:22

## 2020-10-25 RX ADMIN — CYCLOBENZAPRINE 10 MG: 10 TABLET, FILM COATED ORAL at 18:31

## 2020-10-25 RX ADMIN — POTASSIUM CHLORIDE 20 MEQ: 20 SOLUTION ORAL at 10:22

## 2020-10-25 RX ADMIN — ENOXAPARIN SODIUM 40 MG: 40 INJECTION SUBCUTANEOUS at 10:22

## 2020-10-25 RX ADMIN — OXYCODONE HYDROCHLORIDE 10 MG: 5 TABLET ORAL at 09:15

## 2020-10-25 RX ADMIN — OXYCODONE HYDROCHLORIDE 10 MG: 5 TABLET ORAL at 00:39

## 2020-10-25 RX ADMIN — HYDROMORPHONE HYDROCHLORIDE 0.2 MG: 0.2 INJECTION, SOLUTION INTRAMUSCULAR; INTRAVENOUS; SUBCUTANEOUS at 01:35

## 2020-10-25 RX ADMIN — Medication 3 ML: at 16:41

## 2020-10-25 RX ADMIN — KETOROLAC TROMETHAMINE 15 MG: 15 INJECTION, SOLUTION INTRAMUSCULAR; INTRAVENOUS at 16:35

## 2020-10-25 ASSESSMENT — ACTIVITIES OF DAILY LIVING (ADL)
ADLS_ACUITY_SCORE: 14

## 2020-10-25 ASSESSMENT — MIFFLIN-ST. JEOR: SCORE: 1388.77

## 2020-10-25 NOTE — PLAN OF CARE
D AVSS with sat's 96% on room air. Heart regular and lungs decreased in bases otherwise clear. Voiced c/o abdominal pain which has been controlled with Oxycodone and IV dilaudid. No reports of nausea. Up to bathroom with SBA and walker to void adequate amounts of anand urine. Slept well between cares and BG stable.   I Vital's, assessment and med's per order.   A Resting in bed with call light reach.   P Continue to monitor and update MD with changes.

## 2020-10-25 NOTE — PLAN OF CARE
AVSS. JAZZMINE was removed, site leaking, gauze dressing in place. PRN oxycodone and IV dilaudid (for breakthrough pain) and scheduled Ketoralac for pain management . Port heparin locked and Left PIV is saline locked. Abdominal incision is YOSEF and is covered with abdominal binder. SBA with walker. Low residue diet, tolerating well. Voids spontaneously, passing gas, had 2 BM today. Denies nausea.

## 2020-10-25 NOTE — PROGRESS NOTES
Surgical Oncology Progress Note    Interval History:  Passing gas, tolerating full liquid diet. No nausea or emesis. Spontaneously voiding     Physical Exam:   Temp:  [98  F (36.7  C)-99  F (37.2  C)] 98.2  F (36.8  C)  Pulse:  [100-107] 107  Resp:  [15-16] 16  BP: (108-116)/(48-64) 116/48  SpO2:  [96 %-97 %] 96 %  General: Alert, oriented, appears comfortable, NAD.  Respiratory: breathing non labored  Abdomen: Abdomen is soft, non-tender, non-distended. Incision is clean, dry and intact. JAZZMINE serosanginious    Data:   All laboratory and imaging data in the past 24 hours reviewed  I/O last 3 completed shifts:  In: 1000 [P.O.:1000]  Out: 2960 [Urine:2700; Drains:260]  Recent Labs   Lab Test 10/25/20  0655 10/24/20  0731 10/23/20  0637 07/27/20  1015 07/27/20  1015 07/20/20  1509 07/20/20  1509 03/26/20  0705 03/26/20  0705   WBC 7.3 10.2 15.1*   < >  --    < > 25.4*   < >  --    HGB 7.6* 7.5* 7.7*   < >  --    < > 8.8*   < >  --     238 241   < >  --    < > 102*   < >  --    INR  --   --   --   --  1.32*  --  1.20*  --  1.04    < > = values in this interval not displayed.      Recent Labs   Lab Test 10/25/20  0655 10/24/20  0731 10/23/20  0637    138 135   POTASSIUM 3.3* 3.5 3.9   CHLORIDE 105 105 103   CO2 30 28 27   BUN 4* 5* 9   CR 0.69 0.61 0.64   ANIONGAP 4 6 5   PETRA 8.0* 8.1* 8.0*   GLC 86 95 110*        Recent Labs   Lab Test 10/16/20  0925   PROTTOTAL 6.7*   ALBUMIN 2.8*   BILITOTAL 0.4   ALKPHOS 298*   AST 44   ALT 49       Assessment and Plan:     55yoF with a history of gastric cancer now POD3 after exlap, partial omentectomy, Nadine, subtotal gastrectomy, modified D2 lymphadenectomy, David-en-Y GJ     - PRN PO oxycodone , cyclobenzaprine. Added toradol  - Post gastrectomy low residue low fiber bariatric diet , nutrition teaching performed , boost, miralax . Added milk of mag  - Replete K  - PT, -OT  - Lovenox  - Will remove JAZZMINE today  - Likely discharge tomorrow      Josh Delaney MD   Surgical  Oncology

## 2020-10-25 NOTE — PLAN OF CARE
Assumed care for pt from 0636-6509:   VSS on RA. Up ad nelida. Post gastrectomy diet, tolerating well. Denies nausea. Pain controlled with scheduled toradol and PRN oxycodone and flexeril. Simethicone given for some gas pain.  Midline incision with some redness, otherwise clean/dry/intact and open to air. Old JAZZMINE site with dressing, clean/dry/intact. Voiding spontaneously. +BM, +gas. Pt resting comfortable between cares. Continue POC.

## 2020-10-26 ENCOUNTER — PATIENT OUTREACH (OUTPATIENT)
Dept: CARE COORDINATION | Facility: CLINIC | Age: 55
End: 2020-10-26

## 2020-10-26 VITALS
TEMPERATURE: 97.5 F | WEIGHT: 163.2 LBS | DIASTOLIC BLOOD PRESSURE: 53 MMHG | BODY MASS INDEX: 24.74 KG/M2 | SYSTOLIC BLOOD PRESSURE: 105 MMHG | HEIGHT: 68 IN | RESPIRATION RATE: 16 BRPM | OXYGEN SATURATION: 95 % | HEART RATE: 98 BPM

## 2020-10-26 PROBLEM — K56.0 PARALYTIC ILEUS (H): Status: ACTIVE | Noted: 2020-10-26

## 2020-10-26 PROBLEM — E87.6 HYPOKALEMIA: Status: ACTIVE | Noted: 2020-10-26

## 2020-10-26 PROCEDURE — 250N000013 HC RX MED GY IP 250 OP 250 PS 637: Performed by: PHYSICIAN ASSISTANT

## 2020-10-26 PROCEDURE — 250N000013 HC RX MED GY IP 250 OP 250 PS 637: Performed by: STUDENT IN AN ORGANIZED HEALTH CARE EDUCATION/TRAINING PROGRAM

## 2020-10-26 PROCEDURE — 250N000013 HC RX MED GY IP 250 OP 250 PS 637: Performed by: SURGERY

## 2020-10-26 PROCEDURE — 250N000011 HC RX IP 250 OP 636: Performed by: PHYSICIAN ASSISTANT

## 2020-10-26 RX ORDER — IBUPROFEN 600 MG/1
600 TABLET, FILM COATED ORAL EVERY 6 HOURS PRN
Status: DISCONTINUED | OUTPATIENT
Start: 2020-10-26 | End: 2020-10-26 | Stop reason: HOSPADM

## 2020-10-26 RX ORDER — HEPARIN SODIUM (PORCINE) LOCK FLUSH IV SOLN 100 UNIT/ML 100 UNIT/ML
5 SOLUTION INTRAVENOUS
Status: DISCONTINUED | OUTPATIENT
Start: 2020-10-26 | End: 2020-10-26 | Stop reason: HOSPADM

## 2020-10-26 RX ORDER — HEPARIN SODIUM,PORCINE 10 UNIT/ML
5-10 VIAL (ML) INTRAVENOUS EVERY 24 HOURS
Status: DISCONTINUED | OUTPATIENT
Start: 2020-10-26 | End: 2020-10-26 | Stop reason: HOSPADM

## 2020-10-26 RX ORDER — OXYCODONE HYDROCHLORIDE 15 MG/1
15 TABLET ORAL EVERY 6 HOURS PRN
Qty: 30 TABLET | Refills: 0 | Status: SHIPPED | OUTPATIENT
Start: 2020-10-26 | End: 2020-10-30

## 2020-10-26 RX ORDER — POLYETHYLENE GLYCOL 3350 17 G/17G
17 POWDER, FOR SOLUTION ORAL DAILY PRN
Qty: 14 PACKET | Refills: 0 | Status: ON HOLD | OUTPATIENT
Start: 2020-10-26 | End: 2020-12-07

## 2020-10-26 RX ORDER — CYCLOBENZAPRINE HCL 10 MG
10 TABLET ORAL EVERY 8 HOURS PRN
Qty: 20 TABLET | Refills: 0 | Status: ON HOLD | OUTPATIENT
Start: 2020-10-26 | End: 2020-11-06

## 2020-10-26 RX ORDER — HEPARIN SODIUM,PORCINE 10 UNIT/ML
5-10 VIAL (ML) INTRAVENOUS
Status: DISCONTINUED | OUTPATIENT
Start: 2020-10-26 | End: 2020-10-26 | Stop reason: HOSPADM

## 2020-10-26 RX ORDER — OXYCODONE HYDROCHLORIDE 5 MG/1
5-10 TABLET ORAL EVERY 4 HOURS PRN
Qty: 30 TABLET | Refills: 0 | Status: SHIPPED | OUTPATIENT
Start: 2020-10-26 | End: 2020-10-26

## 2020-10-26 RX ORDER — IBUPROFEN 600 MG/1
600 TABLET, FILM COATED ORAL EVERY 6 HOURS PRN
Qty: 50 TABLET | Refills: 0 | Status: SHIPPED | OUTPATIENT
Start: 2020-10-26 | End: 2022-04-14

## 2020-10-26 RX ADMIN — ENOXAPARIN SODIUM 40 MG: 40 INJECTION SUBCUTANEOUS at 10:26

## 2020-10-26 RX ADMIN — Medication 5 ML: at 12:13

## 2020-10-26 RX ADMIN — IBUPROFEN 600 MG: 600 TABLET, FILM COATED ORAL at 10:26

## 2020-10-26 RX ADMIN — OXYCODONE HYDROCHLORIDE 10 MG: 5 TABLET ORAL at 02:00

## 2020-10-26 RX ADMIN — OXYCODONE HYDROCHLORIDE 10 MG: 5 TABLET ORAL at 06:19

## 2020-10-26 RX ADMIN — PANTOPRAZOLE SODIUM 40 MG: 40 TABLET, DELAYED RELEASE ORAL at 08:24

## 2020-10-26 RX ADMIN — OXYCODONE HYDROCHLORIDE 15 MG: 5 TABLET ORAL at 10:26

## 2020-10-26 RX ADMIN — POTASSIUM CHLORIDE 20 MEQ: 20 SOLUTION ORAL at 08:25

## 2020-10-26 RX ADMIN — CITALOPRAM HYDROBROMIDE 20 MG: 20 TABLET ORAL at 08:24

## 2020-10-26 RX ADMIN — CYCLOBENZAPRINE 10 MG: 10 TABLET, FILM COATED ORAL at 08:24

## 2020-10-26 RX ADMIN — SIMETHICONE 40 MG: 20 EMULSION ORAL at 10:33

## 2020-10-26 ASSESSMENT — ACTIVITIES OF DAILY LIVING (ADL)
ADLS_ACUITY_SCORE: 14

## 2020-10-26 NOTE — PROGRESS NOTES
Surgical Oncology Progress Note    Interval History:  2 BMs yesterday.  JAZZMINE removed.  Tolerating low fiber diet. Complaining of bilateral subcostal pain.    Physical Exam:   Temp:  [96.1  F (35.6  C)-97.5  F (36.4  C)] 96.1  F (35.6  C)  Pulse:  [97-98] 97  Resp:  [16] 16  BP: ()/(45-55) 97/55  SpO2:  [95 %-99 %] 99 %  General: Alert, oriented, appears comfortable, NAD.  Respiratory: breathing non labored  Abdomen: Abdomen is soft, appropriately tender, non-distended. Incision is clean, dry and intact.     Data:   All laboratory and imaging data in the past 24 hours reviewed  I/O last 3 completed shifts:  In: 720 [P.O.:720]  Out: 2780 [Urine:2700; Drains:80]  Recent Labs   Lab Test 10/25/20  0655 10/24/20  0731 10/23/20  0637 07/27/20  1015 07/27/20  1015 07/20/20  1509 07/20/20  1509 03/26/20  0705 03/26/20  0705   WBC 7.3 10.2 15.1*   < >  --    < > 25.4*   < >  --    HGB 7.6* 7.5* 7.7*   < >  --    < > 8.8*   < >  --     238 241   < >  --    < > 102*   < >  --    INR  --   --   --   --  1.32*  --  1.20*  --  1.04    < > = values in this interval not displayed.      Recent Labs   Lab Test 10/25/20  0655 10/24/20  0731 10/23/20  0637    138 135   POTASSIUM 3.3* 3.5 3.9   CHLORIDE 105 105 103   CO2 30 28 27   BUN 4* 5* 9   CR 0.69 0.61 0.64   ANIONGAP 4 6 5   PETRA 8.0* 8.1* 8.0*   GLC 86 95 110*        Recent Labs   Lab Test 10/16/20  0925   PROTTOTAL 6.7*   ALBUMIN 2.8*   BILITOTAL 0.4   ALKPHOS 298*   AST 44   ALT 49       Assessment and Plan:     55yoF with a history of gastric cancer now POD3 after exlap, partial omentectomy, Nadine, subtotal gastrectomy, modified D2 lymphadenectomy, David-en-Y GJ     - PRN PO oxycodone , cyclobenzaprine, toradol  - Post gastrectomy low residue low fiber bariatric diet , nutrition teaching performed , boost, miralax, MoM  - PT, OT  - Nutrition c/s to review diet restrictions  - Lovenox  - Likely discharge today    Seen with Chief resident who will discuss with  Staff.     Kwaku Rainey MD   Surgical Oncology

## 2020-10-26 NOTE — PLAN OF CARE
Assumed care of patient from 1900-0730. A&Ox4.VSS on room air. Upper abdominal pain control with IV dilaudid for breakthrough pain and oxycodone with some relief. Post gastrectomy diet, with no nausea. Midline incision clean/dry/intact, open to air. Up with SBA and a walker to the bathroom. Old JAZZMINE site dressing changed as needed. Passing gas, no BM this shift. Calls appropriately. Resting comfortably between care. Continue with plan of care and maintain pain control.

## 2020-10-26 NOTE — PROGRESS NOTES
Discharge Note -Physical Therapy    NAME:  Nathalie Bashir  MRN:   [id number]    S:    Pt did not follow up for therapy as recommended.    O:  Objective information is not available as pt has not returned for therapy.  Initial evaluation note will serve as final entry.    A:   Pt did not return for further treatment.    Status of goals is unknown due to lack of followup by patient.    P:  Discharge from PT this date.    Thank you for this referral,    Yesy Cervantes, PT,   [facility][id number]  [facility]  [phone]

## 2020-10-26 NOTE — PROGRESS NOTES
"[facility]  Palliative Care Daily Progress Note       Recommendations & Counseling       We agree with discharge management plan as per surgery team.  She does not have any cancer pain per se that needs to be managed as she is status post resection.  Thus, their standard postop pain regimen, as per surgery team, seems most appropriate.    We note that her planned discharge oxycodone regimen per surgery team will be 15 mg every 6 hours as needed, anticipating that she will taper in the usual postop fashion over upcoming days.  I explained to her that while she was on IV therapy post op, she was taking 8-12 X the opioid regimen she was on at home, and her current discharge regimen remains 3 times higher than the oxycodone regimen she was on at home for her upper and lower extremity neuropathic pain.  See \"Interim Events\" below.  Since her neuropathic pain has gotten no better or worse during this hospital stay, it does not seem like it is going to respond to increased opioid dosing, so I do not see a reason to increase her opioids for this.  We note that she has a complex history of allergies/reactions; outpatient management can address this further.    We discussed that palliative care will continue to assist with management of her neuropathic pain, but I anticipate her outpatient clinicians will want her to get back to her previous regimen, which is 5 mg up to 4 times daily.  She reports she does still have a supply of her palliative-prescribed oxycodone at home for neuropathic pain.    I notified our clinic that she needs telehealth follow-up in the upcoming near future for her neuropathic pain management.  Rinku Marin MD  Palliative Medicine Consult Team  Pager: [phone]   TT: 24 minutes, with > 50% spent in C/C/E patient/family/care teams re: GOC, POC, Sx management.         Assessments          55 y/o woman with gastric adenocarcinoma found during Whipple done " "in Jan 2020 for presumed IPMN - no pancreatic malignancy found, but had poorly differentiated adenocarcinoma was discovered.  Received neoadjuvant chemo (5FU, leucovorin, oxaliplatin, docetaxel) and now is status post curative intent surgery with Dr. Mallory.   She suffers from chronic neck/back pain and was  previously on opioids managed by a local pain clinic (provider was Apurva Benjamin NP in [place], prescribing oxycodone 10 mg #120/month dating back to at least 3/2019 and at some point was tried on buprenorphine).   Now seen in [facility] ([facility] oxycodone for LE and UE chemo-induced neuropathic pain. Current outpatient regimen includes ongoing Oxy IR 5 mg up to 4 tablets daily.  Today, the patient was seen for: Gastric cancer, neuropathic pain upper and lower extremities.    Prognosis, Goals, or Advance Care Planning was addressed today with: No.  Anticipate discharge to home when ready  Mood, coping, and/or meaning in the context of serious illness were addressed today: Yes.  Summary/Comments: Addressed as noted above.            Interval History:     She is ambulating, stooling and passing flatus and tolerating diet postoperatively.  Her IV Dilaudid has been discontinued and she has been on oral oxycodone for the past several days.  She tells me her pain is \"pretty well controlled but maybe it could be a little better\", that the majority of the pain she does still experience is postop abdominal pain, and that the presence of her neuropathic pain has gotten neither better nor worse during her stay here.  Key Palliative Symptoms:  We are not managing pain in this patient  We are not managing dyspnea in this patient  We are not managing nausea in this patient  We are not managing anxiety in this patient  We are assisting with management of neuropathic pain.           Review of Systems:     Besides above, an additional six-point system ROS was reviewed and is unremarkable          Medications:     I have " "reviewed this patient's medication profile and medications during this hospitalization.  Hydromorphone PCA, having used approximately 17.3 mg of IV hydromorphone in the past 24 hours, now dc'd  Oxycodone 10 mg up to q 4 hours prn (now increased to 15 mgQ 6 prn           Physical Exam:   Blood pressure 105/53, pulse 98, temperature 97.5  F (36.4  C), temperature source Oral, resp. rate 16, height 1.727 m (5' 8\"), weight 74 kg (163 lb 3.2 oz), last menstrual period 09/23/2014, SpO2 95 %, not currently breastfeeding.   General: Alert interactive able to participate in complex conversations. Appears comfortable  Abdomen; surgical wound dressed  CV: Regular radial pulse 96  Extremities: No significant lower extremity edema.           Data Reviewed:     ROUTINE ICU LABS (Last four results)  CMP  Recent Labs   Lab 10/25/20  0655 10/24/20  0731 10/23/20  0637 10/22/20  0724    138 135 136   POTASSIUM 3.3* 3.5 3.9 4.3   CHLORIDE 105 105 103 102   CO2 30 28 27 28   ANIONGAP 4 6 5 6   GLC 86 95 110* 112*   BUN 4* 5* 9 18   CR 0.69 0.61 0.64 0.70   GFRESTIMATED >90 >90 >90 >90   GFRESTBLACK >90 >90 >90 >90   PETRA 8.0* 8.1* 8.0* 8.3*     CBC  Recent Labs   Lab 10/25/20  0655 10/24/20  0731 10/23/20  0637 10/22/20  0724   WBC 7.3 10.2 15.1* 17.8*   RBC 2.82* 2.80* 2.81* 3.45*   HGB 7.6* 7.5* 7.7* 9.3*   HCT 25.7* 25.5* 25.5* 30.7*   MCV 91 91 91 89   MCH 27.0 26.8 27.4 27.0   MCHC 29.6* 29.4* 30.2* 30.3*   RDW 17.7* 17.2* 17.3* 17.2*    238 241 309     INRNo lab results found in last 7 days.  Arterial Blood GasNo lab results found in last 7 days.          "

## 2020-10-26 NOTE — DISCHARGE SUMMARY
[facility] Discharge Summary    Nathalie Bashir MRN# [id number]   Age: 55 year old YOB: 1965     Date of Admission:  10/21/2020  Date of Discharge::  10/26/2020  Admitting Physician:  Yandel Mallory MD  Discharge Physician:  Yandel Mallory MD     PCP:  Marianne Gonzalez    Disposition: Patient discharged to home in stable condition.    Admission Diagnosis:  Gastric cancer  Chronic pain  Continuous opioid dependence  Major depressive disorder  Migraines  Sacroiliitis  Allergic rhinitis  Bursitis  Chronic neck pain  Iron deficiency anemia  History of crohns    Discharge Diagnosis:  Gastric cancer  Ileus  Hypokalemia  Anemia  Chronic pain  Continuous opioid dependence  Major depressive disorder  Migraines  Sacroiliitis  Allergic rhinitis  Bursitis  Chronic neck pain  History of Crohns     Discharge medications  Current Discharge Medication List      START taking these medications    Details   cyclobenzaprine (FLEXERIL) 10 MG tablet Take 1 tablet (10 mg) by mouth every 8 hours as needed for muscle spasms  Qty: 20 tablet, Refills: 0    Associated Diagnoses: Postoperative pain      enoxaparin ANTICOAGULANT (LOVENOX) 40 MG/0.4ML syringe Inject 0.4 mLs (40 mg) Subcutaneous every 24 hours for 23 days  Qty: 9.2 mL, Refills: 0    Associated Diagnoses: Deep vein thrombosis (DVT) prophylaxis prescribed at discharge      polyethylene glycol (MIRALAX) 17 g packet Take 17 g by mouth daily as needed for constipation  Qty: 14 packet, Refills: 0    Associated Diagnoses: Malignant neoplasm of overlapping sites of stomach (H)         CONTINUE these medications which have CHANGED    Details   oxyCODONE (ROXICODONE) 5 MG tablet Take 1-2 tablets (5-10 mg) by mouth every 4 hours as needed for moderate to severe pain  Qty: 30 tablet, Refills: 0    Associated Diagnoses: Postoperative pain         CONTINUE these medications which have NOT CHANGED    Details   amitriptyline (ELAVIL) 25 MG tablet Take 75 mg  by mouth At Bedtime       citalopram (CELEXA) 20 MG tablet Take 20 mg by mouth every morning       hydrOXYzine (ATARAX) 25 MG tablet Take 25 mg by mouth 3 times daily as needed for itching (allergies)      loratadine-pseudoePHEDrine (CLARITIN-D 24-HOUR)  MG 24 hr tablet Take 1 tablet by mouth daily      ondansetron (ZOFRAN) 8 MG tablet Take 1 tablet (8 mg) by mouth every 8 hours as needed (Nausea/Vomiting)  Qty: 90 tablet, Refills: 3    Associated Diagnoses: Gastric adenocarcinoma (H)      pantoprazole (PROTONIX) 40 MG EC tablet TAKE ONE TABLET BY MOUTH TWICE A DAY  Qty: 60 tablet, Refills: 1    Associated Diagnoses: IPMN (intraductal papillary mucinous neoplasm); Malignant neoplasm of pyloric antrum (H); Abdominal pain, epigastric      potassium chloride ER (KLOR-CON M) 20 MEQ CR tablet TAKE ONE TABLET BY MOUTH ONCE DAILY  Qty: 30 tablet, Refills: 1    Associated Diagnoses: Hypokalemia      simethicone 80 MG TABS Take 1 tablet by mouth every 6 hours as needed (bloating, gas, belching)  Qty: 90 tablet, Refills: 3    Associated Diagnoses: Other acute gastritis without hemorrhage; Bloating      SUMAtriptan (IMITREX) 100 MG tablet Take 100 mg by mouth at onset of headache for migraine      loperamide (IMODIUM) 2 MG capsule Take 4 mg by mouth 4 times daily as needed for diarrhea          STOP taking these medications       famotidine (PEPCID) 20 MG tablet Comments:   Reason for Stopping:             Follow up, Special Instructions:  After Care     Future Labs/Procedures    Activity     Comments:    Your activity upon discharge: No lifting more than 10-15 lbs for 6 weeks. Frequent ambulation recommended.    Diet     Comments:    Follow this diet upon discharge: post gastrectomy low residue diet.    Discharge Instructions     Comments:    If your travel plans upon discharge include prolonged periods of sitting (a lengthy car or plane ride), it is highly beneficial to get up and walk at least once per hour to help  "prevent swelling and blood clots.     You may shower after operation, let warm soapy water run over incision and pat dry. Do not submerge, soak, or scrub incision.    Take incentive spirometer home for continued frequent use    You will be discharged with narcotic pain medications. Please take them only as needed and do not operate a car or heavy machinery for 24 hours after taking them.  Narcotic pain medications like oxycodone are constipating. Please ensure that you are drinking adequate amounts of fluids and taking stool softeners while you are taking narcotics. Take Miralax as needed for constipation.     Do not drive until you can with stand pressing the brake pedal quickly and fully without pain and you are not distracted by pain.     Call for fever greater than 101.5, chills, red skin around incision, drainage from incision, increased swelling from the incision, bleeding from the incision that is not controlled with light pressure, inability to tolerate diet,new nausea/vomiting or other new/worsening symptoms.   You may also call the surgical oncology nurse care coordinator from 8:00am-4:30pm MICHAEL Ace at [phone]. For after hours questions or concerns you can contact the on-call surgical oncology resident (nights and weekends [phone] and ask \"I would like to page the Surgical Oncology Resident on call.\"). In emergencies, call [phone]    Diet:  - 4-6 small meals a day. You will need to snack throughout the day rather than 3 big meals a day.   - Once you begin to feel full, you should STOP. Do not over eat, this can lead to abdominal pain and vomiting.   - Make sure to sit up when you are eating. Do not lay down after eating for at least 1 hour.   - Any time you are eating you should be sitting up in a chair, do not eat in bed.     - Avoid red meat at this time. This is difficult to digest.   - If you do get nauseous during eating or after you should stop. Give it time and only proceed with " liquid diet for a day before trying solids again.   - Daily nutrition shakes, high in protein, high in calorie. These help meet daily calorie intake.   - Can try everything included in the liquid diet above as well.   - Stick to foods that can be mashed with a fork EASILY.   - Avoid foods that are high in acidity or spices. These are high risk for causing reflux.   - Avoid raw vegetables. These are difficult to digest.   Examples: Mashed potatoes, eggs, pancakes, bread, toast, salmon/fish (not fried), steamed veggies, puddings, ice cream, oatmeal.     Follow Up:  Follow up in clinic with Dr. Mallory in 2 weeks. You should be called to make an appointment within 3 business days. If you are not contacted, call [phone] to make an appointment.        Procedures:  10/21/20   1. Exploratory Laparotomy  2. Extensive Lysis of Adhesions  3. Partial Omentectomy  4. Resection of Gastrojejunal Anastomosis w/ En Bloc Subtotal Gastrectomy  5. Modified D2 Lymphadenectomy  6. David-en-Y Gastrojejunostomy Reconstruction    Consultations:  PHYSICAL THERAPY ADULT IP CONSULT  OCCUPATIONAL THERAPY ADULT IP CONSULT  PALLIATIVE CARE ADULT IP CONSULT  NUTRITION SERVICES ADULT IP CONSULT    Brief HPI:  Dena Bashir is a 55 year old year old female with incidental diagnosis of signet ring gastric cancer following a Whipple procedure for a separate issue.  She has undergone neoadjuvant chemotherapy and has been recovering slowly.  She has no evidence of metastatic disease on imaging and was also found to have no obvious metastatic disease on diagnostic laparoscopy and peritoneal washings on 10/13/2020.  Given this, she has been recommended to undergo attempted curative intent resection.      Hospital Course:  The patient was admitted and underwent the above procedure. The patient tolerated the procedure well. The patient recovered well with no post-operative complications. Her hospital course was significant for the following.    # Gastric  cancer s/p subtotal gastrectomy: JAZZMINE drain was removed prior to discharge  - Lovenox for 28 days for DVT prophylaxis.   - Continue protonix  - Follow up with Dr. Mallory in 2 weeks.     # Post operative pain, chronic pain, continuous opoid dependence. Pain team consulted.   - Available for pain is oxycodone prn and flexeril.     # Ileus: resolved with conservative management. Diet was advanced as bowel function returned and as tolerated. Nutrition was consulted for diet education.   - Post gastrectomy, low residue diet.     # Hypokalemia: monitored and replaced.   - Resume home potassium     # Iron deficiency anemia and anemia secondary to expected surgical blood loss and dilution: Hemoglobin monitored and stable.     # Major depressive disorder: resumed homw elavil and celexa     # Migraines: resume home Imitrex    Prior to discharge pain was controlled with oral pain medication and the patient was able to ambulate and void without difficulty. The patient received appropriate education post operatively including Lovenox teaching. On POD #5 the patient was discharged to home.    Surgical pathology  Pending     Pia Oneil PA-C

## 2020-10-26 NOTE — PLAN OF CARE
VSS on room air. Pain controlled with oxycodone, ibuprofen, and flexeril. Tolerating post-gastrectomy diet. Voiding with adequate urine output. Passing gas and had a bowel movement yesterday. Midline abdominal incision CDI and YOSEF. Old JAZZMINE site covered with gauze. Up with SBA and walker. Reviewed AVS with patient and patient verbalized understanding. Discharged home.

## 2020-10-26 NOTE — PROGRESS NOTES
"CLINICAL NUTRITION SERVICES     *See education note on 10/23 for initial diet education provided (post-gastrectomy/low residue)    Per provider, pt with additional questions for diet education.    INTERVENTIONS  Implementation  Nutrition education: met with patient, reinforced post-gastrectomy/low fiber diet education.  Answered pt's questions.  Pt verbalized understanding.   Addendum @ 1028: current diet order Post-Gastrectomy with comments \"Low residue   Bariatric RNY gastric diet\".  Updated diet order to combination Post-Gastrectomy + Low Fiber.    RECOMMENDATIONS FOR PROVIDER: patient voiced interest in following up with an outpatient dietitian about a month after discharge.  Please place referral.    Follow-up:    Patient to ask any further nutrition-related questions before discharge.  In addition, pt may request outpatient RD appointment.    RD will continue to follow per LOS protocol and/or if re-consulted.    Sara Steward RD, LD  7C RD pager: [phone]           "

## 2020-10-27 ENCOUNTER — TELEPHONE (OUTPATIENT)
Dept: SURGERY | Facility: CLINIC | Age: 55
End: 2020-10-27

## 2020-10-27 ENCOUNTER — PATIENT OUTREACH (OUTPATIENT)
Dept: GASTROENTEROLOGY | Facility: CLINIC | Age: 55
End: 2020-10-27

## 2020-10-27 NOTE — PROGRESS NOTES
"RN Care Coordination Post Operative Note  Surgical Oncology    Called Nathalie in f/u to recent Resection of Gastrojejunal Anastomosis w/ En Bloc Subtotal Gastrectomy done on  10/21/20 by Dr. Mallory    Support: Patient able to care for self independently with help    Health Status:    Fevers or Chills:  No    Nausea yes, last night  Vomiting \"spit up clear liquid, mostly water\"  Diet: cheese, ate lunch which was mashed potatoes, peaches, drinking water.    No nausea today    Pain: 4 on a scale of one to ten  Description: incisional  Medication: Oxycodone  Relief:yes    Incision: clean dry and intact without edema    Abdominal Drain:  JAZZMINE drain site \"looks good\" and no drainage.    Bowels: passing gas from below without problem, last stool was Sunday; she is not taking anything for her bowels.  \" I have never had issues when taking Oxycodone in the past but she will try the Murelax as prescribed.     Activity/Restrictions:  Walking around the house.     Discussion:  She would like to take Gas X and will do so to see how things feel then.     Post op visit has been scheduled and patient is aware.  Patient has our contact information and I have asked that they call with any further questions or concerns.      Carla LAWSONN, HNBC, STAR-T  RN Care Coordinator  Surgical Oncology  Ph: [phone]  FAX: [phone]        "

## 2020-10-27 NOTE — TELEPHONE ENCOUNTER
Pt called in to triage reporting excessive gas, both belching and flatulence since yesterday. She is on a low fiber/low sugar diet since coming home has been eating small meals and drinking only water, stated she was told not to drink pop. She wants the gas to get out so she has not tried any simethicone or other meds. Has not had a BM since discharge yesterday, denied heartburn or other pains. Routed to care team for follow-up.

## 2020-10-29 ENCOUNTER — VIRTUAL VISIT (OUTPATIENT)
Dept: PALLIATIVE CARE | Facility: CLINIC | Age: 55
DRG: 327 | End: 2020-10-29
Attending: INTERNAL MEDICINE
Payer: MEDICARE

## 2020-10-29 DIAGNOSIS — C16.9 GASTRIC ADENOCARCINOMA (H): ICD-10-CM

## 2020-10-29 DIAGNOSIS — K29.00 OTHER ACUTE GASTRITIS WITHOUT HEMORRHAGE: ICD-10-CM

## 2020-10-29 DIAGNOSIS — G89.18 ACUTE POST-OPERATIVE PAIN: ICD-10-CM

## 2020-10-29 DIAGNOSIS — R14.0 BLOATING: Primary | ICD-10-CM

## 2020-10-29 LAB — COPATH REPORT: NORMAL

## 2020-10-29 PROCEDURE — 99213 OFFICE O/P EST LOW 20 MIN: CPT | Mod: 95 | Performed by: FAMILY MEDICINE

## 2020-10-29 NOTE — PROGRESS NOTES
"Nathalie Bashir is a 55 year old female who is being evaluated via a billable video visit.      The patient has been notified of following:     \"This video visit will be conducted via a call between you and your physician/provider. We have found that certain health care needs can be provided without the need for an in-person physical exam.  This service lets us provide the care you need with a video conversation.  If a prescription is necessary we can send it directly to your pharmacy.  If lab work is needed we can place an order for that and you can then stop by our lab to have the test done at a later time.    Video visits are billed at different rates depending on your insurance coverage.  Please reach out to your insurance provider with any questions.    If during the course of the call the physician/provider feels a video visit is not appropriate, you will not be charged for this service.\"    Patient has given verbal consent for Video visit? Yes    How would you like to obtain your AVS? MyChart    If you are dropped from the video visit, the video invite should be resent to: Text to cell phone: [phone]    Will anyone else be joining your video visit? No         I have reviewed and updated the patient's allergies and medication list.    Concerns: No new concerns.   Refills: Would like to know if she can have a refill on gas-x, prescribed by Dr. Pollard.       Vitals - Patient Reported  Weight (Patient Reported): 73.9 kg (163 lb)  Height (Patient Reported): 172.7 cm (5' 8\")  BMI (Based on Pt Reported Ht/Wt): 24.78  Pain Score: Moderate Pain (5)  Pain Loc: Abdomen    Astrid Couch CMA      Video-Visit Details    Type of service:  Video Visit    Video Start Time: 2:12 PM  Video End Time: 2:42 PM    Originating Location (pt. Location): Home    Distant Location (provider location):  [facility]     Platform used for Video Visit: Miguel A Marie,     Palliative Care Outpatient Clinic " Progress Note    Patient Name:  Nathalie Bashir  Primary Provider:  Marianne Gonzalez    Chief Complaint: Follow-up pain management    Interim History:  Nathalie Bashir 55 year old female returns to be seen by palliative care today. She has a history significant for gastric adenocarcinoma found during Whipple in 2020 for presumed IPMN, no pancreatic malignancy found at that time. S/p neoadjuvant chemotherapy and surgery with curative intent on 10/21/20. Surgery was extensive and included partial omentectomy, resection of GJ anastomosis with en bloc subtotal gastrectomy, modified lymphadenectomy, and David-en-Y Gastrojejunostomy Reconstruction.     She is now 8 days post-op. She is home and is overall doing well.  She feels that her pain is currently well controlled.  She is taking oxycodone 15 mg approximately every 6 hours. Prior to her recent surgery her pain was primarily neuropathy pain in her hands and feet.  She was taking oxycodone intermittently for this. She has multiple neuopathic pain agents listed on her allergy list.     She has otherwise been tolerating a low fiber, low sugar diet well.  She denies ongoing nausea or vomiting, and denies ongoing issues with constipation.     Coping:  She feels encouraged by how well she is feeling after surgery.     Social History:  Pertinent changes to social history/social situation since last visit: None    Social History     Tobacco Use     Smoking status: Former Smoker     Packs/day: 0.50     Years: 25.00     Pack years: 12.50     Types: Cigarettes     Quit date: 2020     Years since quittin.8     Smokeless tobacco: Never Used   Substance Use Topics     Alcohol use: No     Drug use: No         Allergies   Allergen Reactions     Gluten Meal Palpitations     Augmentin Rash     Patient tolerated Zosyn in 2020     Oxycontin [Oxycodone]      Confusion, loopy, oxycodone is tolerated     Pregabalin      interfered with Cymbalta     Topiramate      Tongue numbness,  taste alteration, irritable      Acetaminophen Nausea     Urine retention       Dust Mite Extract      Gabapentin Dizziness and GI Disturbance     Ketorolac Headache     Latex Rash     Methadone Fatigue     Mold      Naprosyn [Naproxen] Dizziness and GI Disturbance     Seasonal Allergies      Current Outpatient Medications   Medication Sig Dispense Refill     amitriptyline (ELAVIL) 25 MG tablet Take 75 mg by mouth At Bedtime        citalopram (CELEXA) 20 MG tablet Take 20 mg by mouth every morning        cyclobenzaprine (FLEXERIL) 10 MG tablet Take 1 tablet (10 mg) by mouth every 8 hours as needed for muscle spasms 20 tablet 0     enoxaparin ANTICOAGULANT (LOVENOX) 40 MG/0.4ML syringe Inject 0.4 mLs (40 mg) Subcutaneous every 24 hours for 23 days 9.2 mL 0     hydrOXYzine (ATARAX) 25 MG tablet Take 25 mg by mouth 3 times daily as needed for itching (allergies)       ibuprofen (ADVIL/MOTRIN) 600 MG tablet Take 1 tablet (600 mg) by mouth every 6 hours as needed for moderate pain 50 tablet 0     loperamide (IMODIUM) 2 MG capsule Take 4 mg by mouth 4 times daily as needed for diarrhea        loratadine-pseudoePHEDrine (CLARITIN-D 24-HOUR)  MG 24 hr tablet Take 1 tablet by mouth daily       ondansetron (ZOFRAN) 8 MG tablet Take 1 tablet (8 mg) by mouth every 8 hours as needed (Nausea/Vomiting) 90 tablet 3     pantoprazole (PROTONIX) 40 MG EC tablet TAKE ONE TABLET BY MOUTH TWICE A DAY 60 tablet 1     polyethylene glycol (MIRALAX) 17 g packet Take 17 g by mouth daily as needed for constipation 14 packet 0     potassium chloride ER (KLOR-CON M) 20 MEQ CR tablet TAKE ONE TABLET BY MOUTH ONCE DAILY 30 tablet 1     simethicone 80 MG TABS Take 1 tablet by mouth every 6 hours as needed (bloating, gas, belching) 90 tablet 3     SUMAtriptan (IMITREX) 100 MG tablet Take 100 mg by mouth at onset of headache for migraine       oxyCODONE IR (ROXICODONE) 5 MG tablet Take 1-2 tablets (5-10 mg) by mouth every 6 hours as needed for  moderate to severe pain 30 tablet 0     Past Medical History:   Diagnosis Date     Allergic rhinitis      Cancer (H)     stomach cancer     Depression      Lumbago      Migraine      Other chronic pain     low back     Sacroiliitis (H)     low back pain     Trochanteric bursitis     leg length discrrepancy, hip pain     Past Surgical History:   Procedure Laterality Date     ARTHROPLASTY HIP Left 2016     BACK SURGERY      L4-5 decompression     C REPAIR DETACH RETINA,SCLERAL BUCKLE       CATARACT IOL, RT/LT       CHOLECYSTECTOMY N/A 1/28/2020    Procedure: Cholecystectomy;  Surgeon: Yandel Mallory MD;  [facility]     ENDOSCOPIC RETROGRADE CHOLANGIOPANCREATOGRAM N/A 7/27/2020    Procedure: ENDOSCOPIC RETROGRADE CHOLANGIOPANCREATOGRAPHY  **Latex Allergy** with jejunal biopsies;  Surgeon: Jeet Aviles MD;  Location:  OR     ESOPHAGOSCOPY, GASTROSCOPY, DUODENOSCOPY (EGD), COMBINED N/A 3/3/2020    Procedure: ESOPHAGOGASTRODUODENOSCOPY, WITH ENDOSCOPIC US (enoxaparin);  Surgeon: Bakari Plata MD;  [facility]     EYE SURGERY       GASTRECTOMY N/A 10/21/2020    Procedure: Open subtotal gastectomy with David en Y Reconstruction; D1 Lymph Node Disection  **Latex Allergy**;  Surgeon: Yandel Mallory MD;  [facility]     IR CHEST PORT PLACEMENT > 5 YRS OF AGE  3/26/2020     LAPAROSCOPY DIAGNOSTIC (GENERAL) N/A 10/13/2020    Procedure: Diagnostic laparoscopy with peritoneal washings  **Latex Allergy**;  Surgeon: Yandel Mallory MD;  [facility]     OPEN REDUCTION INTERNAL FIXATION RODDING INTRAMEDULLARY FEMUR Left 12/23/2014    Procedure: OPEN REDUCTION INTERNAL FIXATION RODDING INTRAMEDULLARY FEMUR;  Surgeon: Arnol Santiago MD;  Location:  OR     ORTHOPEDIC SURGERY       PICC DOUBLE LUMEN PLACEMENT Right 02/07/2020    5 fr double lumen 41 cm     REMOVE HARDWARE LOWER EXTREMITY Left 4/7/2015    Procedure: REMOVE HARDWARE LOWER EXTREMITY;  Surgeon: Arnol Santiago MD;  Location:  OR      REMOVE HARDWARE RODDING INTRAMEDULLARY FEMUR Left 12/17/2015    Procedure: REMOVE HARDWARE RODDING INTRAMEDULLARY FEMUR;  Surgeon: Arnol Santiago MD;  [facility]     RESECT BONE LOWER EXTREMITY Left 12/23/2014    Procedure: RESECT BONE LOWER EXTREMITY;  Surgeon: Arnol Santiago MD;  [facility]     WHIPPLE PROCEDURE N/A 1/28/2020    Procedure: Open Whipple procedure and Cholecystectomy;  Surgeon: Yandel Mallory MD;  [facility]       Review of Systems:   ROS: 10 point ROS neg other than the symptoms noted above in the HPI and pertinents here:  Palliative Symptom Review (0=no symptom/no concern, 1=mild, 2=moderate, 3=severe):      Pain: 1      Fatigue: 0      Nausea: 0      Constipation: 0      Diarrhea: 0      Depressive Symptoms: 0      Anxiety: 0      Drowsiness: 0      Poor Appetite: 1      Shortness of Breath: 0      Insomnia: 0      Other: 0      Overall (0 good/no concerns, 3 very poor):  1           Physical Exam:   Today's visit was completed by video. Middle-aged female resting comfortably at time of evaluation, in no acute distress. Non-labored breathing. Normal eye contact and eye movement. Alert, interactive, answers all questions appropriately. Normal mood and affect.     Key Data Reviewed:  LABS: 10/25/20: K 3.3, Cr 0.69, GFR >90, Hemoglobin 7.6, Platelet 286  IMAGING: Reviewed CT chest/Abd/pelvis from 9/23/20      Impression & Recommendations & Counseling:  Nathalie Bashir 55 year old female returns to be seen by palliative care today; she has been followed primarily for neuropathic pain thought to be caused by chemotherapy. She has a history significant for gastric adenocarcinoma found during Whipple in 1/2020 for presumed IPMN, no pancreatic malignancy found at that time. S/p neoadjuvant chemotherapy and surgery with curative intent on 10/21/20.     She is doing overall well in the post-operative period.  Her pain is currently being managed by surgery in the post op period and  she is on a considerably higher dose of opioids in this setting. Discussed expected need for increased opioids in the postoperative period. Encouraged her to start self-weaning her doses if she feels that her pain is very well controlled on her current regimen, could consider trialing oxycodone 7.5 (1/2 tab) q6 hours PRN. Discussed that goal will be to get her back to her prior dosage of oxycodone once her post-surgery pain has improved. She is in agreement with this plan. Follow-up in 6-8 weeks, or sooner if needed.     Al Marie DO  Palliative Medicine Fellow  Staffed with Dr. Scott    Attending attestation:   Patient seen and evaluated with Dr. Marie and I agree with findings/recs in this note.  Prior to surgery, was taking elavil and oxycodone 5 mg a few times/day for neuropathy pain, and I'd expect she would be able to get back to this as she recovers from surgery.   Thank you for involving us in the patient's care.   Precious Scott MD / Palliative Medicine / Pager [phone] / After-Hours Answering Service [phone] / Main Palliative Clinic - [facility] [phone] / [facility] Inpatient Team Consult Pager [phone] (answered 8am-430pm M-F)

## 2020-10-29 NOTE — LETTER
"10/29/2020     RE: Nathalie Bashir  [address] [facility] [address] [place] [address]     Dear Colleague,    Thank you for referring your patient, Nathalie Bashir, to the [facility]INIC at [facility]. Please see a copy of my visit note below.    Nathalie Bashir is a 55 year old female who is being evaluated via a billable video visit.      The patient has been notified of following:     \"This video visit will be conducted via a call between you and your physician/provider. We have found that certain health care needs can be provided without the need for an in-person physical exam.  This service lets us provide the care you need with a video conversation.  If a prescription is necessary we can send it directly to your pharmacy.  If lab work is needed we can place an order for that and you can then stop by our lab to have the test done at a later time.    Video visits are billed at different rates depending on your insurance coverage.  Please reach out to your insurance provider with any questions.    If during the course of the call the physician/provider feels a video visit is not appropriate, you will not be charged for this service.\"    Patient has given verbal consent for Video visit? Yes    How would you like to obtain your AVS? MyChart    If you are dropped from the video visit, the video invite should be resent to: Text to cell phone: [phone]    Will anyone else be joining your video visit? No         I have reviewed and updated the patient's allergies and medication list.    Concerns: No new concerns.   Refills: Would like to know if she can have a refill on gas-x, prescribed by Dr. Pollard.       Vitals - Patient Reported  Weight (Patient Reported): 73.9 kg (163 lb)  Height (Patient Reported): 172.7 cm (5' 8\")  BMI (Based on Pt Reported Ht/Wt): 24.78  Pain Score: Moderate Pain (5)  Pain Loc: Abdomen    Astrid Couch CMA      Video-Visit Details    Type of " service:  Video Visit    Video Start Time: 2:12 PM  Video End Time: 2:42 PM    Originating Location (pt. Location): Home    Distant Location (provider location):  [facility]     Platform used for Video Visit: Miguel A Marie DO    Palliative Care Outpatient Clinic Progress Note    Patient Name:  Nathalie Bashir  Primary Provider:  Marianne Gonzalez    Chief Complaint: Follow-up pain management    Interim History:  Nathalie Bashir 55 year old female returns to be seen by palliative care today. She has a history significant for gastric adenocarcinoma found during Whipple in 2020 for presumed IPMN, no pancreatic malignancy found at that time. S/p neoadjuvant chemotherapy and surgery with curative intent on 10/21/20. Surgery was extensive and included partial omentectomy, resection of GJ anastomosis with en bloc subtotal gastrectomy, modified lymphadenectomy, and David-en-Y Gastrojejunostomy Reconstruction.     She is now 8 days post-op. She is home and is overall doing well.  She feels that her pain is currently well controlled.  She is taking oxycodone 15 mg approximately every 6 hours. Prior to her recent surgery her pain was primarily neuropathy pain in her hands and feet.  She was taking oxycodone intermittently for this. She has multiple neuopathic pain agents listed on her allergy list.     She has otherwise been tolerating a low fiber, low sugar diet well.  She denies ongoing nausea or vomiting, and denies ongoing issues with constipation.     Coping:  She feels encouraged by how well she is feeling after surgery.     Social History:  Pertinent changes to social history/social situation since last visit: None    Social History     Tobacco Use     Smoking status: Former Smoker     Packs/day: 0.50     Years: 25.00     Pack years: 12.50     Types: Cigarettes     Quit date: 2020     Years since quittin.8     Smokeless tobacco: Never Used   Substance Use Topics     Alcohol use: No      Drug use: No         Allergies   Allergen Reactions     Gluten Meal Palpitations     Augmentin Rash     Patient tolerated Zosyn in 7/2020     Oxycontin [Oxycodone]      Confusion, loopy, oxycodone is tolerated     Pregabalin      interfered with Cymbalta     Topiramate      Tongue numbness, taste alteration, irritable      Acetaminophen Nausea     Urine retention     Dust Mite Extract      Gabapentin Dizziness and GI Disturbance     Ketorolac Headache     Latex Rash     Methadone Fatigue     Mold      Naprosyn [Naproxen] Dizziness and GI Disturbance     Seasonal Allergies      Current Outpatient Medications   Medication Sig Dispense Refill     amitriptyline (ELAVIL) 25 MG tablet Take 75 mg by mouth At Bedtime        citalopram (CELEXA) 20 MG tablet Take 20 mg by mouth every morning        cyclobenzaprine (FLEXERIL) 10 MG tablet Take 1 tablet (10 mg) by mouth every 8 hours as needed for muscle spasms 20 tablet 0     enoxaparin ANTICOAGULANT (LOVENOX) 40 MG/0.4ML syringe Inject 0.4 mLs (40 mg) Subcutaneous every 24 hours for 23 days 9.2 mL 0     hydrOXYzine (ATARAX) 25 MG tablet Take 25 mg by mouth 3 times daily as needed for itching (allergies)       ibuprofen (ADVIL/MOTRIN) 600 MG tablet Take 1 tablet (600 mg) by mouth every 6 hours as needed for moderate pain 50 tablet 0     loperamide (IMODIUM) 2 MG capsule Take 4 mg by mouth 4 times daily as needed for diarrhea        loratadine-pseudoePHEDrine (CLARITIN-D 24-HOUR)  MG 24 hr tablet Take 1 tablet by mouth daily       ondansetron (ZOFRAN) 8 MG tablet Take 1 tablet (8 mg) by mouth every 8 hours as needed (Nausea/Vomiting) 90 tablet 3     pantoprazole (PROTONIX) 40 MG EC tablet TAKE ONE TABLET BY MOUTH TWICE A DAY 60 tablet 1     polyethylene glycol (MIRALAX) 17 g packet Take 17 g by mouth daily as needed for constipation 14 packet 0     potassium chloride ER (KLOR-CON M) 20 MEQ CR tablet TAKE ONE TABLET BY MOUTH ONCE DAILY 30 tablet 1     simethicone 80 MG  TABS Take 1 tablet by mouth every 6 hours as needed (bloating, gas, belching) 90 tablet 3     SUMAtriptan (IMITREX) 100 MG tablet Take 100 mg by mouth at onset of headache for migraine       oxyCODONE IR (ROXICODONE) 5 MG tablet Take 1-2 tablets (5-10 mg) by mouth every 6 hours as needed for moderate to severe pain 30 tablet 0     Past Medical History:   Diagnosis Date     Allergic rhinitis      Cancer (H)     stomach cancer     Depression      Lumbago      Migraine      Other chronic pain     low back     Sacroiliitis (H)     low back pain     Trochanteric bursitis     leg length discrrepancy, hip pain     Past Surgical History:   Procedure Laterality Date     ARTHROPLASTY HIP Left 2016     BACK SURGERY      L4-5 decompression     C REPAIR DETACH RETINA,SCLERAL BUCKLE       CATARACT IOL, RT/LT       CHOLECYSTECTOMY N/A 1/28/2020    Procedure: Cholecystectomy;  Surgeon: Yandel Mallory MD;  [facility]     ENDOSCOPIC RETROGRADE CHOLANGIOPANCREATOGRAM N/A 7/27/2020    Procedure: ENDOSCOPIC RETROGRADE CHOLANGIOPANCREATOGRAPHY  **Latex Allergy** with jejunal biopsies;  Surgeon: Jeet Aviles MD;  Location:  OR     ESOPHAGOSCOPY, GASTROSCOPY, DUODENOSCOPY (EGD), COMBINED N/A 3/3/2020    Procedure: ESOPHAGOGASTRODUODENOSCOPY, WITH ENDOSCOPIC US (enoxaparin);  Surgeon: Bakari Plata MD;  Location:  GI     EYE SURGERY       GASTRECTOMY N/A 10/21/2020    Procedure: Open subtotal gastectomy with David en Y Reconstruction; D1 Lymph Node Disection  **Latex Allergy**;  Surgeon: Yandel Mallory MD;  Location:  OR     IR CHEST PORT PLACEMENT > 5 YRS OF AGE  3/26/2020     LAPAROSCOPY DIAGNOSTIC (GENERAL) N/A 10/13/2020    Procedure: Diagnostic laparoscopy with peritoneal washings  **Latex Allergy**;  Surgeon: Yandel Mallory MD;  [facility]     OPEN REDUCTION INTERNAL FIXATION RODDING INTRAMEDULLARY FEMUR Left 12/23/2014    Procedure: OPEN REDUCTION INTERNAL FIXATION RODDING INTRAMEDULLARY FEMUR;   Surgeon: Arnol Santiago MD;  [facility]     ORTHOPEDIC SURGERY       PICC DOUBLE LUMEN PLACEMENT Right 02/07/2020    5 fr double lumen 41 cm     REMOVE HARDWARE LOWER EXTREMITY Left 4/7/2015    Procedure: REMOVE HARDWARE LOWER EXTREMITY;  Surgeon: Arnol Santiago MD;  [facility]     REMOVE HARDWARE RODDING INTRAMEDULLARY FEMUR Left 12/17/2015    Procedure: REMOVE HARDWARE RODDING INTRAMEDULLARY FEMUR;  Surgeon: Arnol Santiago MD;  [facility]     RESECT BONE LOWER EXTREMITY Left 12/23/2014    Procedure: RESECT BONE LOWER EXTREMITY;  Surgeon: Arnol Santiago MD;  [facility]     WHIPPLE PROCEDURE N/A 1/28/2020    Procedure: Open Whipple procedure and Cholecystectomy;  Surgeon: Yandel Mallory MD;  [facility]     Review of Systems:   ROS: 10 point ROS neg other than the symptoms noted above in the HPI and pertinents here:  Palliative Symptom Review (0=no symptom/no concern, 1=mild, 2=moderate, 3=severe):      Pain: 1      Fatigue: 0      Nausea: 0      Constipation: 0      Diarrhea: 0      Depressive Symptoms: 0      Anxiety: 0      Drowsiness: 0      Poor Appetite: 1      Shortness of Breath: 0      Insomnia: 0      Other: 0      Overall (0 good/no concerns, 3 very poor):  1         Physical Exam:   Today's visit was completed by video. Middle-aged female resting comfortably at time of evaluation, in no acute distress. Non-labored breathing. Normal eye contact and eye movement. Alert, interactive, answers all questions appropriately. Normal mood and affect.     Key Data Reviewed:  LABS: 10/25/20: K 3.3, Cr 0.69, GFR >90, Hemoglobin 7.6, Platelet 286  IMAGING: Reviewed CT chest/Abd/pelvis from 9/23/20    Impression & Recommendations & Counseling:  Nathalie Bashir 55 year old female returns to be seen by palliative care today; she has been followed primarily for neuropathic pain thought to be caused by chemotherapy. She has a history significant for gastric adenocarcinoma found during  Whipple in 1/2020 for presumed IPMN, no pancreatic malignancy found at that time. S/p neoadjuvant chemotherapy and surgery with curative intent on 10/21/20.     She is doing overall well in the post-operative period.  Her pain is currently being managed by surgery in the post op period and she is on a considerably higher dose of opioids in this setting. Discussed expected need for increased opioids in the postoperative period. Encouraged her to start self-weaning her doses if she feels that her pain is very well controlled on her current regimen, could consider trialing oxycodone 7.5 (1/2 tab) q6 hours PRN. Discussed that goal will be to get her back to her prior dosage of oxycodone once her post-surgery pain has improved. She is in agreement with this plan. Follow-up in 6-8 weeks, or sooner if needed.     Al Marie DO  Palliative Medicine Fellow  Staffed with Dr. Scott    Attending attestation: Patient seen and evaluated with Dr. Marie and I agree with findings/recs in this note.  Prior to surgery, was taking elavil and oxycodone 5 mg a few times/day for neuropathy pain, and I'd expect she would be able to get back to this as she recovers from surgery.   Thank you for involving us in the patient's care.   Precious Scott MD / Palliative Medicine / Pager [phone] / After-Hours Answering Service [phone] / Main Palliative Clinic - [facility] [phone] / [facility] Inpatient Team Consult Pager [phone] (answered 8am-430pm M-F)

## 2020-10-30 ENCOUNTER — PATIENT OUTREACH (OUTPATIENT)
Dept: SURGERY | Facility: CLINIC | Age: 55
End: 2020-10-30

## 2020-10-30 DIAGNOSIS — G89.18 POSTOPERATIVE PAIN: ICD-10-CM

## 2020-10-30 RX ORDER — OXYCODONE HYDROCHLORIDE 5 MG/1
5-10 TABLET ORAL EVERY 6 HOURS PRN
Qty: 30 TABLET | Refills: 0 | Status: ON HOLD | OUTPATIENT
Start: 2020-10-30 | End: 2020-11-06

## 2020-10-30 NOTE — TELEPHONE ENCOUNTER
Refill Request    Date of most recent appointment:  10/21/20  Next upcoming appointment:   11/6/20    Medication requested:  oxyCODONE (ROXICODONE) 15 MG tablet  Quantity:  30  Last fill date:  10/26  Person requesting refill:  Patient calls in to triage stating she has one tablet left. Requesting a refill  Notes:  Unable to check , not a listed delegate for provider.

## 2020-10-30 NOTE — TELEPHONE ENCOUNTER
Surgical Oncology RN Care Coordination Note:     Called and left a message for patient informing her the refill of the Rx was sent the [facility] pharmacy in [facility] listed in her chart.     Kenzie Ace, RN, BSN  Care Coordinator

## 2020-11-02 ENCOUNTER — TELEPHONE (OUTPATIENT)
Dept: SURGERY | Facility: CLINIC | Age: 55
End: 2020-11-02

## 2020-11-02 NOTE — TELEPHONE ENCOUNTER
I called the patient to attempt to tell her about her final surgical pathology report.  The voicemail did not identify a person, so I did not leave a message.  Will attempt to call back at a different time.

## 2020-11-04 ENCOUNTER — TELEPHONE (OUTPATIENT)
Dept: SURGERY | Facility: CLINIC | Age: 55
End: 2020-11-04

## 2020-11-04 ENCOUNTER — HOSPITAL ENCOUNTER (INPATIENT)
Facility: CLINIC | Age: 55
LOS: 1 days | Discharge: HOME | DRG: 920 | End: 2020-11-06
Attending: EMERGENCY MEDICINE | Admitting: SURGERY
Payer: MEDICARE

## 2020-11-04 ENCOUNTER — APPOINTMENT (OUTPATIENT)
Dept: CT IMAGING | Facility: CLINIC | Age: 55
DRG: 920 | End: 2020-11-04
Attending: EMERGENCY MEDICINE
Payer: MEDICARE

## 2020-11-04 DIAGNOSIS — Z20.828 EXPOSURE TO SARS-ASSOCIATED CORONAVIRUS: ICD-10-CM

## 2020-11-04 DIAGNOSIS — R11.2 NAUSEA AND VOMITING, INTRACTABILITY OF VOMITING NOT SPECIFIED, UNSPECIFIED VOMITING TYPE: ICD-10-CM

## 2020-11-04 DIAGNOSIS — R18.8 INTRA-ABDOMINAL FLUID COLLECTION: ICD-10-CM

## 2020-11-04 DIAGNOSIS — R10.84 ABDOMINAL PAIN, GENERALIZED: ICD-10-CM

## 2020-11-04 DIAGNOSIS — G89.18 POSTOPERATIVE PAIN: Primary | ICD-10-CM

## 2020-11-04 LAB
ALBUMIN SERPL-MCNC: 2.4 G/DL (ref 3.4–5)
ALP SERPL-CCNC: 199 U/L (ref 40–150)
ALT SERPL W P-5'-P-CCNC: 16 U/L (ref 0–50)
ANION GAP SERPL CALCULATED.3IONS-SCNC: 7 MMOL/L (ref 3–14)
AST SERPL W P-5'-P-CCNC: 20 U/L (ref 0–45)
BASOPHILS # BLD AUTO: 0.1 10E9/L (ref 0–0.2)
BASOPHILS NFR BLD AUTO: 0.7 %
BILIRUB SERPL-MCNC: 0.3 MG/DL (ref 0.2–1.3)
BUN SERPL-MCNC: 11 MG/DL (ref 7–30)
CALCIUM SERPL-MCNC: 8.7 MG/DL (ref 8.5–10.1)
CHLORIDE SERPL-SCNC: 108 MMOL/L (ref 94–109)
CO2 SERPL-SCNC: 25 MMOL/L (ref 20–32)
CREAT SERPL-MCNC: 0.86 MG/DL (ref 0.52–1.04)
DIFFERENTIAL METHOD BLD: ABNORMAL
EOSINOPHIL # BLD AUTO: 0.3 10E9/L (ref 0–0.7)
EOSINOPHIL NFR BLD AUTO: 2.9 %
ERYTHROCYTE [DISTWIDTH] IN BLOOD BY AUTOMATED COUNT: 19 % (ref 10–15)
GFR SERPL CREATININE-BSD FRML MDRD: 77 ML/MIN/{1.73_M2}
GLUCOSE SERPL-MCNC: 93 MG/DL (ref 70–99)
HCT VFR BLD AUTO: 30.2 % (ref 35–47)
HGB BLD-MCNC: 8.9 G/DL (ref 11.7–15.7)
IMM GRANULOCYTES # BLD: 0 10E9/L (ref 0–0.4)
IMM GRANULOCYTES NFR BLD: 0.3 %
LIPASE SERPL-CCNC: 24 U/L (ref 73–393)
LYMPHOCYTES # BLD AUTO: 1.9 10E9/L (ref 0.8–5.3)
LYMPHOCYTES NFR BLD AUTO: 22.1 %
MCH RBC QN AUTO: 27.2 PG (ref 26.5–33)
MCHC RBC AUTO-ENTMCNC: 29.5 G/DL (ref 31.5–36.5)
MCV RBC AUTO: 92 FL (ref 78–100)
MONOCYTES # BLD AUTO: 0.5 10E9/L (ref 0–1.3)
MONOCYTES NFR BLD AUTO: 5.8 %
NEUTROPHILS # BLD AUTO: 5.9 10E9/L (ref 1.6–8.3)
NEUTROPHILS NFR BLD AUTO: 68.2 %
NRBC # BLD AUTO: 0 10*3/UL
NRBC BLD AUTO-RTO: 0 /100
PLATELET # BLD AUTO: 401 10E9/L (ref 150–450)
POTASSIUM SERPL-SCNC: 3.8 MMOL/L (ref 3.4–5.3)
PROT SERPL-MCNC: 6.6 G/DL (ref 6.8–8.8)
RBC # BLD AUTO: 3.27 10E12/L (ref 3.8–5.2)
SODIUM SERPL-SCNC: 140 MMOL/L (ref 133–144)
WBC # BLD AUTO: 8.6 10E9/L (ref 4–11)

## 2020-11-04 PROCEDURE — 74177 CT ABD & PELVIS W/CONTRAST: CPT | Mod: 26 | Performed by: RADIOLOGY

## 2020-11-04 PROCEDURE — C9803 HOPD COVID-19 SPEC COLLECT: HCPCS | Performed by: EMERGENCY MEDICINE

## 2020-11-04 PROCEDURE — 258N000003 HC RX IP 258 OP 636: Performed by: EMERGENCY MEDICINE

## 2020-11-04 PROCEDURE — 85025 COMPLETE CBC W/AUTO DIFF WBC: CPT | Performed by: EMERGENCY MEDICINE

## 2020-11-04 PROCEDURE — 250N000011 HC RX IP 250 OP 636: Performed by: EMERGENCY MEDICINE

## 2020-11-04 PROCEDURE — 96376 TX/PRO/DX INJ SAME DRUG ADON: CPT | Performed by: EMERGENCY MEDICINE

## 2020-11-04 PROCEDURE — 96361 HYDRATE IV INFUSION ADD-ON: CPT | Performed by: EMERGENCY MEDICINE

## 2020-11-04 PROCEDURE — 83690 ASSAY OF LIPASE: CPT | Performed by: EMERGENCY MEDICINE

## 2020-11-04 PROCEDURE — 96374 THER/PROPH/DIAG INJ IV PUSH: CPT | Mod: 59 | Performed by: EMERGENCY MEDICINE

## 2020-11-04 PROCEDURE — 74177 CT ABD & PELVIS W/CONTRAST: CPT

## 2020-11-04 PROCEDURE — 99285 EMERGENCY DEPT VISIT HI MDM: CPT | Mod: 25 | Performed by: EMERGENCY MEDICINE

## 2020-11-04 PROCEDURE — 96375 TX/PRO/DX INJ NEW DRUG ADDON: CPT | Performed by: EMERGENCY MEDICINE

## 2020-11-04 PROCEDURE — 80053 COMPREHEN METABOLIC PANEL: CPT | Performed by: EMERGENCY MEDICINE

## 2020-11-04 PROCEDURE — 99285 EMERGENCY DEPT VISIT HI MDM: CPT | Performed by: EMERGENCY MEDICINE

## 2020-11-04 RX ORDER — ONDANSETRON 2 MG/ML
4 INJECTION INTRAMUSCULAR; INTRAVENOUS ONCE
Status: COMPLETED | OUTPATIENT
Start: 2020-11-04 | End: 2020-11-04

## 2020-11-04 RX ORDER — IOPAMIDOL 755 MG/ML
99 INJECTION, SOLUTION INTRAVASCULAR ONCE
Status: COMPLETED | OUTPATIENT
Start: 2020-11-04 | End: 2020-11-04

## 2020-11-04 RX ORDER — ONDANSETRON 2 MG/ML
4 INJECTION INTRAMUSCULAR; INTRAVENOUS EVERY 30 MIN PRN
Status: COMPLETED | OUTPATIENT
Start: 2020-11-04 | End: 2020-11-05

## 2020-11-04 RX ORDER — HYDROMORPHONE HYDROCHLORIDE 1 MG/ML
0.5 INJECTION, SOLUTION INTRAMUSCULAR; INTRAVENOUS; SUBCUTANEOUS
Status: COMPLETED | OUTPATIENT
Start: 2020-11-04 | End: 2020-11-05

## 2020-11-04 RX ORDER — SODIUM CHLORIDE 9 MG/ML
INJECTION, SOLUTION INTRAVENOUS CONTINUOUS
Status: DISCONTINUED | OUTPATIENT
Start: 2020-11-04 | End: 2020-11-06

## 2020-11-04 RX ADMIN — ONDANSETRON 4 MG: 2 INJECTION INTRAMUSCULAR; INTRAVENOUS at 18:41

## 2020-11-04 RX ADMIN — SODIUM CHLORIDE 1000 ML: 9 INJECTION, SOLUTION INTRAVENOUS at 16:50

## 2020-11-04 RX ADMIN — IOPAMIDOL 99 ML: 755 INJECTION, SOLUTION INTRAVENOUS at 23:24

## 2020-11-04 RX ADMIN — HYDROMORPHONE HYDROCHLORIDE 0.5 MG: 1 INJECTION, SOLUTION INTRAMUSCULAR; INTRAVENOUS; SUBCUTANEOUS at 23:03

## 2020-11-04 RX ADMIN — SODIUM CHLORIDE 1000 ML: 9 INJECTION, SOLUTION INTRAVENOUS at 23:02

## 2020-11-04 RX ADMIN — ONDANSETRON 4 MG: 2 INJECTION INTRAMUSCULAR; INTRAVENOUS at 23:03

## 2020-11-04 ASSESSMENT — ENCOUNTER SYMPTOMS
HEMATURIA: 0
DYSURIA: 0
VOMITING: 1
FREQUENCY: 0
ABDOMINAL PAIN: 1
NAUSEA: 1
FLANK PAIN: 0

## 2020-11-04 ASSESSMENT — MIFFLIN-ST. JEOR: SCORE: 1374.26

## 2020-11-04 NOTE — ED TRIAGE NOTES
Partial gastrectomy surgery on October 21st. Vomiting, decreased PO intake, left sided abdominal pain.

## 2020-11-04 NOTE — TELEPHONE ENCOUNTER
POST-OP CALL  Nov 4, 2020    Nathalie Bashir is a 55 year old female s/p subtotal gastrectomy.     For the past several days she has been feeling nauseous. Had a couple episodes of vomiting clear mucus. She is able to eat and drink fluids. She had eggs this morning. She has Boost at home but is not taking it. She denies abdominal pain or fever. She had a loose watery bowel movement yesterday. She is not on laxative or stool softeners. She has zofran at home. She will start Boost and eat/drink small frequent meals. We discuss if she is not able to drink enough fluids or she develops fever, abdominal pain, continued diarrhea or stops having bowel movement she will need to go to the ER. If she continues to have loose stool will check for C.diff. She has an appointment this week with Dr. Mallory.   Message forwarded to Dr. Mallory for review and further recommendations.     Pia Oneil PA-C

## 2020-11-04 NOTE — TELEPHONE ENCOUNTER
"Spoke with patient, she calls with nausea and episodes of \"spitting up\" clear, yellow tinged fluid. She mentions after eating or drinking whatever she swallows will come back up. She denies fever, chills, abdominal pain. She has tried Zofran 8 mg and stated she vomited after the dose. She mentions she will try the medication again to see if it helps with nausea. Paged Pia Oneil PA-C at [phone]  "

## 2020-11-05 ENCOUNTER — APPOINTMENT (OUTPATIENT)
Dept: GENERAL RADIOLOGY | Facility: CLINIC | Age: 55
DRG: 920 | End: 2020-11-05
Attending: SURGERY
Payer: MEDICARE

## 2020-11-05 ENCOUNTER — APPOINTMENT (OUTPATIENT)
Dept: GENERAL RADIOLOGY | Facility: CLINIC | Age: 55
DRG: 920 | End: 2020-11-05
Payer: MEDICARE

## 2020-11-05 ENCOUNTER — APPOINTMENT (OUTPATIENT)
Dept: CT IMAGING | Facility: CLINIC | Age: 55
DRG: 920 | End: 2020-11-05
Payer: MEDICARE

## 2020-11-05 PROBLEM — R11.2 NAUSEA & VOMITING: Status: ACTIVE | Noted: 2020-11-05

## 2020-11-05 LAB
INR PPP: 1.07 (ref 0.86–1.14)
LABORATORY COMMENT REPORT: NORMAL
POTASSIUM SERPL-SCNC: 3.7 MMOL/L (ref 3.4–5.3)
SARS-COV-2 RNA SPEC QL NAA+PROBE: NEGATIVE
SARS-COV-2 RNA SPEC QL NAA+PROBE: NORMAL
SPECIMEN SOURCE: NORMAL
SPECIMEN SOURCE: NORMAL

## 2020-11-05 PROCEDURE — 74018 RADEX ABDOMEN 1 VIEW: CPT | Mod: 26 | Performed by: RADIOLOGY

## 2020-11-05 PROCEDURE — 36415 COLL VENOUS BLD VENIPUNCTURE: CPT | Performed by: STUDENT IN AN ORGANIZED HEALTH CARE EDUCATION/TRAINING PROGRAM

## 2020-11-05 PROCEDURE — 250N000013 HC RX MED GY IP 250 OP 250 PS 637: Performed by: STUDENT IN AN ORGANIZED HEALTH CARE EDUCATION/TRAINING PROGRAM

## 2020-11-05 PROCEDURE — 74240 X-RAY XM UPR GI TRC 1CNTRST: CPT | Mod: 26 | Performed by: RADIOLOGY

## 2020-11-05 PROCEDURE — 250N000011 HC RX IP 250 OP 636: Performed by: STUDENT IN AN ORGANIZED HEALTH CARE EDUCATION/TRAINING PROGRAM

## 2020-11-05 PROCEDURE — 258N000003 HC RX IP 258 OP 636: Performed by: STUDENT IN AN ORGANIZED HEALTH CARE EDUCATION/TRAINING PROGRAM

## 2020-11-05 PROCEDURE — 85610 PROTHROMBIN TIME: CPT | Performed by: SURGERY

## 2020-11-05 PROCEDURE — C9113 INJ PANTOPRAZOLE SODIUM, VIA: HCPCS | Performed by: STUDENT IN AN ORGANIZED HEALTH CARE EDUCATION/TRAINING PROGRAM

## 2020-11-05 PROCEDURE — 84132 ASSAY OF SERUM POTASSIUM: CPT | Performed by: STUDENT IN AN ORGANIZED HEALTH CARE EDUCATION/TRAINING PROGRAM

## 2020-11-05 PROCEDURE — 96361 HYDRATE IV INFUSION ADD-ON: CPT | Performed by: EMERGENCY MEDICINE

## 2020-11-05 PROCEDURE — 96375 TX/PRO/DX INJ NEW DRUG ADDON: CPT | Performed by: EMERGENCY MEDICINE

## 2020-11-05 PROCEDURE — 250N000011 HC RX IP 250 OP 636: Performed by: EMERGENCY MEDICINE

## 2020-11-05 PROCEDURE — 74240 X-RAY XM UPR GI TRC 1CNTRST: CPT

## 2020-11-05 PROCEDURE — 74176 CT ABD & PELVIS W/O CONTRAST: CPT | Mod: 26 | Performed by: RADIOLOGY

## 2020-11-05 PROCEDURE — U0003 INFECTIOUS AGENT DETECTION BY NUCLEIC ACID (DNA OR RNA); SEVERE ACUTE RESPIRATORY SYNDROME CORONAVIRUS 2 (SARS-COV-2) (CORONAVIRUS DISEASE [COVID-19]), AMPLIFIED PROBE TECHNIQUE, MAKING USE OF HIGH THROUGHPUT TECHNOLOGIES AS DESCRIBED BY CMS-2020-01-R: HCPCS | Performed by: SURGERY

## 2020-11-05 PROCEDURE — 74176 CT ABD & PELVIS W/O CONTRAST: CPT

## 2020-11-05 PROCEDURE — 120N000002 HC R&B MED SURG/OB UMMC

## 2020-11-05 PROCEDURE — 258N000001 HC RX 258: Performed by: SURGERY

## 2020-11-05 PROCEDURE — 999N000065 XR ABDOMEN 1 VW

## 2020-11-05 PROCEDURE — 96376 TX/PRO/DX INJ SAME DRUG ADON: CPT | Performed by: EMERGENCY MEDICINE

## 2020-11-05 RX ORDER — ONDANSETRON 4 MG/1
4 TABLET, ORALLY DISINTEGRATING ORAL EVERY 6 HOURS PRN
Status: DISCONTINUED | OUTPATIENT
Start: 2020-11-05 | End: 2020-11-06 | Stop reason: HOSPADM

## 2020-11-05 RX ORDER — ACETAMINOPHEN 325 MG/1
975 TABLET ORAL 3 TIMES DAILY
Status: DISCONTINUED | OUTPATIENT
Start: 2020-11-05 | End: 2020-11-05

## 2020-11-05 RX ORDER — HYDROXYZINE HYDROCHLORIDE 25 MG/1
25 TABLET, FILM COATED ORAL EVERY 6 HOURS PRN
Status: DISCONTINUED | OUTPATIENT
Start: 2020-11-05 | End: 2020-11-06 | Stop reason: HOSPADM

## 2020-11-05 RX ORDER — CYCLOBENZAPRINE HCL 10 MG
10 TABLET ORAL EVERY 8 HOURS PRN
Status: DISCONTINUED | OUTPATIENT
Start: 2020-11-05 | End: 2020-11-06 | Stop reason: HOSPADM

## 2020-11-05 RX ORDER — METHOCARBAMOL 500 MG/1
500 TABLET, FILM COATED ORAL 4 TIMES DAILY
Status: DISCONTINUED | OUTPATIENT
Start: 2020-11-05 | End: 2020-11-06 | Stop reason: HOSPADM

## 2020-11-05 RX ORDER — ONDANSETRON 2 MG/ML
4 INJECTION INTRAMUSCULAR; INTRAVENOUS EVERY 6 HOURS PRN
Status: DISCONTINUED | OUTPATIENT
Start: 2020-11-05 | End: 2020-11-06 | Stop reason: HOSPADM

## 2020-11-05 RX ORDER — OXYCODONE HYDROCHLORIDE 5 MG/1
5-10 TABLET ORAL EVERY 6 HOURS PRN
Status: DISCONTINUED | OUTPATIENT
Start: 2020-11-05 | End: 2020-11-06 | Stop reason: HOSPADM

## 2020-11-05 RX ORDER — CITALOPRAM HYDROBROMIDE 10 MG/1
20 TABLET ORAL EVERY MORNING
Status: DISCONTINUED | OUTPATIENT
Start: 2020-11-05 | End: 2020-11-06 | Stop reason: HOSPADM

## 2020-11-05 RX ORDER — SODIUM CHLORIDE, SODIUM LACTATE, POTASSIUM CHLORIDE, CALCIUM CHLORIDE 600; 310; 30; 20 MG/100ML; MG/100ML; MG/100ML; MG/100ML
1000 INJECTION, SOLUTION INTRAVENOUS CONTINUOUS
Status: DISCONTINUED | OUTPATIENT
Start: 2020-11-05 | End: 2020-11-05

## 2020-11-05 RX ORDER — HYDROXYZINE HYDROCHLORIDE 25 MG/1
25 TABLET, FILM COATED ORAL 3 TIMES DAILY PRN
Status: DISCONTINUED | OUTPATIENT
Start: 2020-11-05 | End: 2020-11-05

## 2020-11-05 RX ORDER — PROCHLORPERAZINE 25 MG
25 SUPPOSITORY, RECTAL RECTAL EVERY 12 HOURS PRN
Status: DISCONTINUED | OUTPATIENT
Start: 2020-11-05 | End: 2020-11-06 | Stop reason: HOSPADM

## 2020-11-05 RX ORDER — PROCHLORPERAZINE MALEATE 5 MG
10 TABLET ORAL EVERY 6 HOURS PRN
Status: DISCONTINUED | OUTPATIENT
Start: 2020-11-05 | End: 2020-11-06 | Stop reason: HOSPADM

## 2020-11-05 RX ADMIN — HYDROMORPHONE HYDROCHLORIDE 0.5 MG: 1 INJECTION, SOLUTION INTRAMUSCULAR; INTRAVENOUS; SUBCUTANEOUS at 03:25

## 2020-11-05 RX ADMIN — ONDANSETRON 4 MG: 2 INJECTION INTRAMUSCULAR; INTRAVENOUS at 17:37

## 2020-11-05 RX ADMIN — HYDROXYZINE HYDROCHLORIDE 25 MG: 25 TABLET, FILM COATED ORAL at 20:49

## 2020-11-05 RX ADMIN — DEXTROSE AND SODIUM CHLORIDE: 5; 450 INJECTION, SOLUTION INTRAVENOUS at 10:18

## 2020-11-05 RX ADMIN — OXYCODONE HYDROCHLORIDE 10 MG: 5 TABLET ORAL at 12:39

## 2020-11-05 RX ADMIN — DEXTROSE AND SODIUM CHLORIDE: 5; 450 INJECTION, SOLUTION INTRAVENOUS at 20:32

## 2020-11-05 RX ADMIN — ONDANSETRON 4 MG: 2 INJECTION INTRAMUSCULAR; INTRAVENOUS at 06:17

## 2020-11-05 RX ADMIN — OXYCODONE HYDROCHLORIDE 10 MG: 5 TABLET ORAL at 18:16

## 2020-11-05 RX ADMIN — ONDANSETRON 4 MG: 2 INJECTION INTRAMUSCULAR; INTRAVENOUS at 11:43

## 2020-11-05 RX ADMIN — ONDANSETRON 4 MG: 2 INJECTION INTRAMUSCULAR; INTRAVENOUS at 03:37

## 2020-11-05 RX ADMIN — METHOCARBAMOL 500 MG: 500 TABLET, FILM COATED ORAL at 22:25

## 2020-11-05 RX ADMIN — PANTOPRAZOLE SODIUM 40 MG: 40 INJECTION, POWDER, FOR SOLUTION INTRAVENOUS at 15:59

## 2020-11-05 RX ADMIN — SODIUM CHLORIDE, POTASSIUM CHLORIDE, SODIUM LACTATE AND CALCIUM CHLORIDE 1000 ML: 600; 310; 30; 20 INJECTION, SOLUTION INTRAVENOUS at 03:49

## 2020-11-05 RX ADMIN — CITALOPRAM HYDROBROMIDE 20 MG: 20 TABLET ORAL at 09:12

## 2020-11-05 RX ADMIN — AMITRIPTYLINE HYDROCHLORIDE 75 MG: 25 TABLET, FILM COATED ORAL at 20:44

## 2020-11-05 RX ADMIN — HYDROMORPHONE HYDROCHLORIDE 0.5 MG: 1 INJECTION, SOLUTION INTRAMUSCULAR; INTRAVENOUS; SUBCUTANEOUS at 00:52

## 2020-11-05 RX ADMIN — OXYCODONE HYDROCHLORIDE 10 MG: 5 TABLET ORAL at 05:51

## 2020-11-05 RX ADMIN — CYCLOBENZAPRINE 10 MG: 10 TABLET, FILM COATED ORAL at 15:59

## 2020-11-05 ASSESSMENT — ACTIVITIES OF DAILY LIVING (ADL): ADLS_ACUITY_SCORE: 14

## 2020-11-05 NOTE — CONSULTS
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 CT scan shows 4.9 x2.4 cm loculated fluid collection with partial peripheral enhancement in the LUQ adjacent to surgical site concerning for leak vs early abscess. Pt is afebrile, without leukocytosis and HDS. IR is consulted for possible perisplenic fluid collection drainage.     Case and imaging was reviewed with Dr. Mercado from IR. Upper GI this PM confirms no leak. Pt with afebrile and without leukocytosis. Collection is small, in a challenging location for drain placement, and likely post-operative hematoma. Would defer drainage at this time and recommend medical management.     Recommendations were reviewed with Dr. Delaney.    Vitals:   BP (!) 141/76   Pulse 97   Temp 98.7  F (37.1  C) (Oral)   Resp 17   Ht 1.727 m (5' 8\")   Wt 72.6 kg (160 lb)   LMP 09/23/2014   SpO2 98%   BMI 24.33 kg/m      Pertinent Labs:     Lab Results   Component Value Date    WBC 8.6 11/04/2020    WBC 7.3 10/25/2020    WBC 10.2 10/24/2020       Lab Results   Component Value Date    HGB 8.9 11/04/2020    HGB 7.6 10/25/2020    HGB 7.5 10/24/2020       Lab Results   Component Value Date     11/04/2020     10/25/2020     10/24/2020       Lab Results   Component Value Date    INR 1.07 11/05/2020       Lab Results   Component Value Date    POTASSIUM 3.7 11/05/2020        NINA Patel CNP  Interventional Radiology   Pager: [phone]      "

## 2020-11-05 NOTE — H&P
SURGICAL ONCOLOGY ADMISSION HISTORY & PHYSICAL  Nathalie Bashir MRN# [id number]   YOB: 1965 Age: 55 year old     Date of Admission: 11/05/20    CC: nausea and vomiting    HPI: Nathalie Bashir is a 55 year old year old female w/ a history of signet ring gastric cancer now s/p subtotal gastrectomy, discharged on 10/26/20. She presents now with nausea and vomiting. States that she has had these symptoms since Sunday, and has had at least 4 episodes of emesis since then. Feels intense need to vomit after eating small amount of food. Not tolerating solid food or water at this time. Emesis clear or yellow. Denies fevers, sob. Last BM today, was loose. No previous diarrhea.     REVIEW OF SYSTEMS: The remainder of the complete ROS was negative unless noted in the HPI. Denies visual changes, headache, sore throat, rhinorrhea, chest pain, sob, fevers, night sweats, weight loss.     PAST MEDICAL HISTORY:   Past Medical History:   Diagnosis Date     Allergic rhinitis      Cancer (H)     stomach cancer     Depression      Lumbago      Migraine      Other chronic pain     low back     Sacroiliitis (H)     low back pain     Trochanteric bursitis     leg length discrrepancy, hip pain       PAST SURGICAL HISTORY:   Past Surgical History:   Procedure Laterality Date     ARTHROPLASTY HIP Left 2016     BACK SURGERY      L4-5 decompression     C REPAIR DETACH RETINA,SCLERAL BUCKLE       CATARACT IOL, RT/LT       CHOLECYSTECTOMY N/A 1/28/2020    Procedure: Cholecystectomy;  Surgeon: Yandel Mallory MD;  Location: [facility] OR     ENDOSCOPIC RETROGRADE CHOLANGIOPANCREATOGRAM N/A 7/27/2020    Procedure: ENDOSCOPIC RETROGRADE CHOLANGIOPANCREATOGRAPHY  **Latex Allergy** with jejunal biopsies;  Surgeon: Jeet Aviles MD;  Location: [facility] OR     ESOPHAGOSCOPY, GASTROSCOPY, DUODENOSCOPY (EGD), COMBINED N/A 3/3/2020    Procedure: ESOPHAGOGASTRODUODENOSCOPY, WITH ENDOSCOPIC US (enoxaparin);  Surgeon: Bakari Plata MD;   Location: [facility] GI     EYE SURGERY       GASTRECTOMY N/A 10/21/2020    Procedure: Open subtotal gastectomy with David en Y Reconstruction; D1 Lymph Node Disection  **Latex Allergy**;  Surgeon: Yandel Mallory MD;  Location: [facility] OR     IR CHEST PORT PLACEMENT > 5 YRS OF AGE  3/26/2020     LAPAROSCOPY DIAGNOSTIC (GENERAL) N/A 10/13/2020    Procedure: Diagnostic laparoscopy with peritoneal washings  **Latex Allergy**;  Surgeon: Yandel Mallory MD;  Location: [facility]     OPEN REDUCTION INTERNAL FIXATION RODDING INTRAMEDULLARY FEMUR Left 12/23/2014    Procedure: OPEN REDUCTION INTERNAL FIXATION RODDING INTRAMEDULLARY FEMUR;  Surgeon: Arnol Santiago MD;  Location: [facility]     ORTHOPEDIC SURGERY       PICC DOUBLE LUMEN PLACEMENT Right 02/07/2020    5 fr double lumen 41 cm     REMOVE HARDWARE LOWER EXTREMITY Left 4/7/2015    Procedure: REMOVE HARDWARE LOWER EXTREMITY;  Surgeon: Arnol Santiago MD;  Location: [facility]     REMOVE HARDWARE RODDING INTRAMEDULLARY FEMUR Left 12/17/2015    Procedure: REMOVE HARDWARE RODDING INTRAMEDULLARY FEMUR;  Surgeon: Arnol Santiago MD;  [facility]     RESECT BONE LOWER EXTREMITY Left 12/23/2014    Procedure: RESECT BONE LOWER EXTREMITY;  Surgeon: Arnol Santiago MD;  Location: [facility]     WHIPPLE PROCEDURE N/A 1/28/2020    Procedure: Open Whipple procedure and Cholecystectomy;  Surgeon: Yandel Mallory MD;  Location: [facility]       ALLERGIES:    Allergies   Allergen Reactions     Gluten Meal Palpitations     Augmentin Rash     Patient tolerated Zosyn in 7/2020     Oxycontin [Oxycodone]      Confusion, loopy, oxycodone is tolerated     Pregabalin      interfered with Cymbalta     Topiramate      Tongue numbness, taste alteration, irritable      Acetaminophen Nausea     Urine retention       Dust Mite Extract      Gabapentin Dizziness and GI Disturbance     Ketorolac Headache     Latex Rash     Methadone Fatigue     Mold      Naprosyn [Naproxen] Dizziness and GI Disturbance     Seasonal  Allergies        HOME MEDICATIONS: No current facility-administered medications on file prior to encounter.        amitriptyline (ELAVIL) 25 MG tablet, Take 75 mg by mouth At Bedtime        citalopram (CELEXA) 20 MG tablet, Take 20 mg by mouth every morning        cyclobenzaprine (FLEXERIL) 10 MG tablet, Take 1 tablet (10 mg) by mouth every 8 hours as needed for muscle spasms       enoxaparin ANTICOAGULANT (LOVENOX) 40 MG/0.4ML syringe, Inject 0.4 mLs (40 mg) Subcutaneous every 24 hours for 23 days       hydrOXYzine (ATARAX) 25 MG tablet, Take 25 mg by mouth 3 times daily as needed for itching (allergies)       ibuprofen (ADVIL/MOTRIN) 600 MG tablet, Take 1 tablet (600 mg) by mouth every 6 hours as needed for moderate pain       loperamide (IMODIUM) 2 MG capsule, Take 4 mg by mouth 4 times daily as needed for diarrhea        loratadine-pseudoePHEDrine (CLARITIN-D 24-HOUR)  MG 24 hr tablet, Take 1 tablet by mouth daily       ondansetron (ZOFRAN) 8 MG tablet, Take 1 tablet (8 mg) by mouth every 8 hours as needed (Nausea/Vomiting)       oxyCODONE IR (ROXICODONE) 5 MG tablet, Take 1-2 tablets (5-10 mg) by mouth every 6 hours as needed for moderate to severe pain       pantoprazole (PROTONIX) 40 MG EC tablet, TAKE ONE TABLET BY MOUTH TWICE A DAY       polyethylene glycol (MIRALAX) 17 g packet, Take 17 g by mouth daily as needed for constipation       potassium chloride ER (KLOR-CON M) 20 MEQ CR tablet, TAKE ONE TABLET BY MOUTH ONCE DAILY       simethicone 80 MG TABS, Take 1 tablet by mouth every 6 hours as needed (bloating, gas, belching)       SUMAtriptan (IMITREX) 100 MG tablet, Take 100 mg by mouth at onset of headache for migraine        SOCIAL HISTORY:   Social History     Tobacco Use     Smoking status: Former Smoker     Packs/day: 0.50     Years: 25.00     Pack years: 12.50     Types: Cigarettes     Quit date: 2020     Years since quittin.8     Smokeless tobacco: Never Used   Substance Use Topics      "Alcohol use: No     Drug use: No       FAMILY HISTORY:   Family History   Problem Relation Age of Onset     Heart Failure Mother 77     Dementia Father         frontal lobe     No Known Problems Sister      No Known Problems Brother      Anesthesia Reaction No family hx of      Deep Vein Thrombosis No family hx of        PHYSICAL EXAMINATION:  /60   Pulse 84   Temp 98.7  F (37.1  C) (Oral)   Resp 16   Ht 1.727 m (5' 8\")   Wt 72.6 kg (160 lb)   LMP 09/23/2014   SpO2 98%   BMI 24.33 kg/m       General: NAD, awake and alert   CV: Non-cyanotic   Pulm: No increased work of breathing on room air   Abd: soft, mildly distended, minimally tender to palpation, incision healing well, no erythema  Extremities: WWP without edema  Neuro: No focal deficits noted, patient moves all extremities spontaneously    LABS:  Recent Labs   Lab 11/04/20  1646   WBC 8.6   RBC 3.27*   HGB 8.9*   HCT 30.2*   MCV 92   MCH 27.2   MCHC 29.5*   RDW 19.0*          Recent Labs   Lab 11/04/20  1646      POTASSIUM 3.8   CHLORIDE 108   CO2 25   BUN 11   CR 0.86   GLC 93   PETRA 8.7       Recent Labs   Lab 11/04/20  1646   AST 20   ALT 16   ALKPHOS 199*   BILITOTAL 0.3   ALBUMIN 2.4*       IMAGING:  Ct Abdomen Pelvis W Contrast  Result Date: 11/5/2020  IMPRESSION: 1.  Postsurgical change subtotal gastrectomy with David-en-Y. Loculated fluid with partial peripheral enhancement in the left upper quadrant adjacent to the surgical site. Developing abscess or contained leak not excluded. 2.  Wall thickening of small bowel left upper quadrant could be reactive from recent surgery. Enteritis not excluded. 3.  No bowel obstruction. 4.  Small left pleural effusion.      A/P: Nathalie Bashir is a 55 year old female with recent subtotal gastrectomy for signet ring cancer, now with nausea and vomiting and CT findings concerning for developing abscess    - Admit to Surgical Oncology under Dr. Du  - NPO  - IVF  - Upper GI in " AM  - NGT  - Hold off on antibiotics right now  - Zofran    Discussed with Dr. Delaney and Dr. Ana Laura Ndiaye MD  Plastic Surgery PGY2  [phone]

## 2020-11-05 NOTE — ED NOTES
"     Emergency Department Patient Sign-out       Brief HPI:  This is a 55 year old female signed out to me by Dr. Baker .  See initial ED Provider note for details of the presentation.            Significant Events prior to my assuming care: Patient with recent gastrectomy with nausea, vomiting and pain. CT and surgery consult pending prior to disposition.      Exam:   Patient Vitals for the past 24 hrs:   BP Temp Temp src Pulse Resp SpO2 Height Weight   11/04/20 1848 -- -- -- 84 -- -- -- --   11/04/20 1639 122/60 98.7  F (37.1  C) Oral 105 16 98 % 1.727 m (5' 8\") 72.6 kg (160 lb)           ED RESULTS:   Results for orders placed or performed during the hospital encounter of 11/04/20 (from the past 24 hour(s))   CBC with platelets differential     Status: Abnormal    Collection Time: 11/04/20  4:46 PM   Result Value Ref Range    WBC 8.6 4.0 - 11.0 10e9/L    RBC Count 3.27 (L) 3.8 - 5.2 10e12/L    Hemoglobin 8.9 (L) 11.7 - 15.7 g/dL    Hematocrit 30.2 (L) 35.0 - 47.0 %    MCV 92 78 - 100 fl    MCH 27.2 26.5 - 33.0 pg    MCHC 29.5 (L) 31.5 - 36.5 g/dL    RDW 19.0 (H) 10.0 - 15.0 %    Platelet Count 401 150 - 450 10e9/L    Diff Method Automated Method     % Neutrophils 68.2 %    % Lymphocytes 22.1 %    % Monocytes 5.8 %    % Eosinophils 2.9 %    % Basophils 0.7 %    % Immature Granulocytes 0.3 %    Nucleated RBCs 0 0 /100    Absolute Neutrophil 5.9 1.6 - 8.3 10e9/L    Absolute Lymphocytes 1.9 0.8 - 5.3 10e9/L    Absolute Monocytes 0.5 0.0 - 1.3 10e9/L    Absolute Eosinophils 0.3 0.0 - 0.7 10e9/L    Absolute Basophils 0.1 0.0 - 0.2 10e9/L    Abs Immature Granulocytes 0.0 0 - 0.4 10e9/L    Absolute Nucleated RBC 0.0    Comprehensive metabolic panel     Status: Abnormal    Collection Time: 11/04/20  4:46 PM   Result Value Ref Range    Sodium 140 133 - 144 mmol/L    Potassium 3.8 3.4 - 5.3 mmol/L    Chloride 108 94 - 109 mmol/L    Carbon Dioxide 25 20 - 32 mmol/L    Anion Gap 7 3 - 14 mmol/L    Glucose 93 70 - 99 mg/dL "    Urea Nitrogen 11 7 - 30 mg/dL    Creatinine 0.86 0.52 - 1.04 mg/dL    GFR Estimate 77 >60 mL/min/[1.73_m2]    GFR Estimate If Black 89 >60 mL/min/[1.73_m2]    Calcium 8.7 8.5 - 10.1 mg/dL    Bilirubin Total 0.3 0.2 - 1.3 mg/dL    Albumin 2.4 (L) 3.4 - 5.0 g/dL    Protein Total 6.6 (L) 6.8 - 8.8 g/dL    Alkaline Phosphatase 199 (H) 40 - 150 U/L    ALT 16 0 - 50 U/L    AST 20 0 - 45 U/L   Lipase     Status: Abnormal    Collection Time: 11/04/20  4:46 PM   Result Value Ref Range    Lipase 24 (L) 73 - 393 U/L       ED MEDICATIONS:   Medications   ondansetron (ZOFRAN) injection 4 mg (4 mg Intravenous Given 11/4/20 2303)   HYDROmorphone (PF) (DILAUDID) injection 0.5 mg (0.5 mg Intravenous Given 11/4/20 2303)   0.9% sodium chloride BOLUS (1,000 mLs Intravenous New Bag 11/4/20 2302)     Followed by   sodium chloride 0.9% infusion (has no administration in time range)   0.9% sodium chloride BOLUS (0 mLs Intravenous Stopped 11/4/20 1848)   ondansetron (ZOFRAN) injection 4 mg (4 mg Intravenous Given 11/4/20 1841)   iopamidol (ISOVUE-370) solution 99 mL (99 mLs Intravenous Given 11/4/20 2324)   sodium chloride (PF) 0.9% PF flush 75 mL (75 mLs Intravenous Given 11/4/20 2324)         Impression:  No diagnosis found.    Plan:    CT is concerning for versus leak.  Surgical oncology to admit.  She is placed n.p.o.        MD Madi Jefferson, Antonino Phoenix MD  11/05/20 9859

## 2020-11-05 NOTE — ED NOTES
Pt presents to room reporting some improvement in nausea after Zofran and IVF in the lobby. Pt also reports her vomiting only occurs with PO intake. Pt describes her vomit at home as being mucous consistency and sometimes yellow.

## 2020-11-05 NOTE — ED NOTES
[facility]    ED Nurse to Floor Handoff     Nathalie Bashir is a 55 year old female who speaks English and lives alone,  in a home  They arrived in the ED by car from home    ED Chief Complaint: Vomiting (Partial gastrectomy surgery on October 21st. Vomiting, decreased PO intake, left sided abdominal pain.)    ED Dx;   Final diagnoses:   Abdominal pain, generalized   Nausea and vomiting, intractability of vomiting not specified, unspecified vomiting type         Needed?: No    Allergies:   Allergies   Allergen Reactions     Gluten Meal Palpitations     Augmentin Rash     Patient tolerated Zosyn in 7/2020     Oxycontin [Oxycodone]      Confusion, loopy, oxycodone is tolerated     Pregabalin      interfered with Cymbalta     Topiramate      Tongue numbness, taste alteration, irritable      Acetaminophen Nausea     Urine retention       Dust Mite Extract      Gabapentin Dizziness and GI Disturbance     Ketorolac Headache     Latex Rash     Methadone Fatigue     Mold      Naprosyn [Naproxen] Dizziness and GI Disturbance     Seasonal Allergies    .  Past Medical Hx:   Past Medical History:   Diagnosis Date     Allergic rhinitis      Cancer (H)     stomach cancer     Depression      Lumbago      Migraine      Other chronic pain     low back     Sacroiliitis (H)     low back pain     Trochanteric bursitis     leg length discrrepancy, hip pain      Baseline Mental status: WDL  Current Mental Status changes: at basesline    Infection present or suspected this encounter: cultures pending  Sepsis suspected: No  Isolation type: No active isolations  Patient tested for COVID 19 prior to admission: NO     Activity level - Baseline/Home:  Independent and Cane  Activity Level - Current:   Cane    Bariatric equipment needed?: No    In the ED these meds were given:   Medications   0.9% sodium chloride BOLUS (0 mLs Intravenous Stopped 11/5/20 0002)     Followed by   sodium  chloride 0.9% infusion (has no administration in time range)   lactated ringers infusion (1,000 mLs Intravenous New Bag 11/5/20 0349)   acetaminophen (TYLENOL) tablet 975 mg (has no administration in time range)   ondansetron (ZOFRAN-ODT) ODT tab 4 mg (has no administration in time range)     Or   ondansetron (ZOFRAN) injection 4 mg (has no administration in time range)   prochlorperazine (COMPAZINE) injection 10 mg (has no administration in time range)     Or   prochlorperazine (COMPAZINE) tablet 10 mg (has no administration in time range)     Or   prochlorperazine (COMPAZINE) suppository 25 mg (has no administration in time range)   citalopram (celeXA) tablet 20 mg (has no administration in time range)   cyclobenzaprine (FLEXERIL) tablet 10 mg (has no administration in time range)   enoxaparin ANTICOAGULANT (LOVENOX) injection 40 mg (has no administration in time range)   hydrOXYzine (ATARAX) tablet 25 mg (has no administration in time range)   oxyCODONE (ROXICODONE) tablet 5-10 mg (10 mg Oral Given 11/5/20 0551)   0.9% sodium chloride BOLUS (0 mLs Intravenous Stopped 11/4/20 1848)   ondansetron (ZOFRAN) injection 4 mg (4 mg Intravenous Given 11/4/20 1841)   ondansetron (ZOFRAN) injection 4 mg (4 mg Intravenous Given 11/5/20 0617)   HYDROmorphone (PF) (DILAUDID) injection 0.5 mg (0.5 mg Intravenous Given 11/5/20 0325)   iopamidol (ISOVUE-370) solution 99 mL (99 mLs Intravenous Given 11/4/20 2324)   sodium chloride (PF) 0.9% PF flush 75 mL (75 mLs Intravenous Given 11/4/20 2324)       Drips running?  No    Home pump  No    Current LDAs  Port A Cath Single 03/06/20 Right Chest wall (Active)   Number of days: 244       NG/OG/NJ Tube Nasogastric 16 fr Left nostril (Active)   Site Description WDL 11/05/20 0554   Status Clamped 11/05/20 0554   Placement Confirmation Respiratory status unchanged;X-ray 11/05/20 0554   [place] (cm marking) at nare/mouth 54 cm 11/05/20 0554   Number of days: 0       Wound Surgical;Other  (comment) (Active)   Number of days: 11       Incision/Surgical Site 01/28/20 Abdomen (Active)   Number of days: 282       Incision/Surgical Site 03/26/20 Right;Upper Chest (Active)   Number of days: 224       Incision/Surgical Site 10/13/20 Abdomen (Active)   Number of days: 23       Incision/Surgical Site 10/21/20 Midline Abdomen (Active)   Number of days: 15       Labs results:   Labs Ordered and Resulted from Time of ED Arrival Up to the Time of Departure from the ED   CBC WITH PLATELETS DIFFERENTIAL - Abnormal; Notable for the following components:       Result Value    RBC Count 3.27 (*)     Hemoglobin 8.9 (*)     Hematocrit 30.2 (*)     MCHC 29.5 (*)     RDW 19.0 (*)     All other components within normal limits   COMPREHENSIVE METABOLIC PANEL - Abnormal; Notable for the following components:    Albumin 2.4 (*)     Protein Total 6.6 (*)     Alkaline Phosphatase 199 (*)     All other components within normal limits   LIPASE - Abnormal; Notable for the following components:    Lipase 24 (*)     All other components within normal limits   POTASSIUM   COVID-19 VIRUS (CORONAVIRUS) BY PCR   IP ASSIGN PROVIDER TEAM TO TREATMENT TEAM   VITAL SIGNS   ACTIVITY   PULSE OXIMETRY NURSING   INTAKE AND OUTPUT   INCENTIVE SPIROMETRY NURSING   APPLY PNEUMATIC COMPRESSION DEVICE (PCD)   NASOGASTRIC TUBE DECOMPRESSION       Imaging Studies:   Recent Results (from the past 24 hour(s))   CT Abdomen Pelvis w Contrast    Narrative    EXAM: CT ABDOMEN PELVIS W CONTRAST  LOCATION: [facility]  DATE/TIME: 11/4/2020 11:20 PM    INDICATION: Left abdominal pain post gastrectomy.  COMPARISON: 09/23/2020  TECHNIQUE: CT scan of the abdomen and pelvis was performed following injection of IV contrast. Multiplanar reformats were obtained. Dose reduction techniques were used.  CONTRAST: 99 ml isovue 370     FINDINGS:   LOWER CHEST: Small left pleural effusion and mild basilar atelectasis. Tiny amount of air along mediastinum  "presumably residual postsurgical air.    HEPATOBILIARY: Heterogeneous enhancement of the liver again noted. No biliary dilatation. Wall thickening and enhancement of the extra hepatic bile duct again noted. Mild pneumobilia. Slight stranding along the kd hepatis unchanged.    PANCREAS: Stable appearance residual pancreas. Air within the pancreatic duct.    SPLEEN: Stable.    ADRENAL GLANDS: Normal.    KIDNEYS/BLADDER: Normal.    BOWEL: Postsurgical change of subtotal gastrectomy with David-en-Y reconstruction. Previous Whipple procedure. Loculated, partially enhancing fluid collection left upper quadrant between the residual stomach and spleen series 3 image 104 and coronal image   31-39. On coronal image 31 measures proximally 4.9 cm craniocaudal by 2.4 cm transverse. Wall thickening of adjacent small bowel and mild mesenteric edema. Bowel is normal caliber. Diverticulosis.    LYMPH NODES: Subcentimeter retroperitoneal lymph nodes.    VASCULATURE: Mild atherosclerotic vascular calcification.    PELVIC ORGANS: Partial obscuration by streak artifact from left hip prosthesis. Grossly stable.    MUSCULOSKELETAL: Left hip prosthesis. Postsurgical change left SI joint. Degenerative change.      Impression    IMPRESSION:   1.  Postsurgical change subtotal gastrectomy with David-en-Y. Loculated fluid with partial peripheral enhancement in the left upper quadrant adjacent to the surgical site. Developing abscess or contained leak not excluded.  2.  Wall thickening of small bowel left upper quadrant could be reactive from recent surgery. Enteritis not excluded.  3.  No bowel obstruction.  4.  Small left pleural effusion.       Recent vital signs:   /76   Pulse 98   Temp 98.7  F (37.1  C) (Oral)   Resp 16   Ht 1.727 m (5' 8\")   Wt 72.6 kg (160 lb)   LMP 09/23/2014   SpO2 98%   BMI 24.33 kg/m      [place] Coma Scale Score: 15 (11/04/20 2207)       Cardiac Rhythm: Normal Sinus  Pt needs tele? No  Skin/wound " Issues: None    Code Status: Full Code    Pain control: fair    Nausea control: good    Abnormal labs/tests/findings requiring intervention: CT findings show pt may have abscess forming underneath a recent surgical wound.     Family present during ED course? No   Family Comments/Social Situation comments: N/A    Tasks needing completion: None    Dean Renee RN    [phone] [facility]

## 2020-11-05 NOTE — UTILIZATION REVIEW
Admission Status; Secondary Review Determination     Admission Date: 11/4/2020  9:57 PM       Under the authority of the Utilization Management Committee, the utilization review process indicated a secondary review on the above patient.  The review outcome is based on review of the medical records, discussions with staff, and applying clinical experience noted on the date of the review.        (x)      Inpatient Status Appropriate - This patient's medical care is consistent with medical management for inpatient care and reasonable inpatient medical practice.       RATIONALE FOR DETERMINATION      Brief clinical presentation, information copied from the chart, abbreviated and edited for relevant content:     Nathalie Bashir is a 55 year old female with recent subtotal gastrectomy for signet ring cancer, now with nausea and vomiting and CT findings concerning for developing abscess. Admited to Surgical Oncolog. Patient is NPO on IVF with plan for Upper GI in AM. NGT started. Currently off on antibiotics right now pending work up. CT abd and UGI with KUB pending. Admitted last night, with anticipated LOS greater than 2 nights.   .     At the time of admission with the information available to the attending physician, more than 2 nights hospital complex care was anticipated. Also, there was a risk of adverse outcome if patient was treated outpatient or observation. High intensity of services anticipated. Inpatient admission appropriate based on Medicare guidelines.       The information on this document is developed by the utilization review team in order for the business office to ensure compliance.  This only denotes the appropriateness of proper admission status and does not reflect the quality of care rendered.         The definitions of Inpatient Status and Observation Status used in making the determination above are those provided in the CMS Coverage Manual, Chapter 1 and Chapter 6, section 70.4.      Sincerely,       Sofie Puentes MD   Utilization Review/ Case Management  [facility], [place].

## 2020-11-06 ENCOUNTER — APPOINTMENT (OUTPATIENT)
Dept: SURGERY | Facility: CLINIC | Age: 55
DRG: 920 | End: 2020-11-06
Attending: SURGERY
Payer: MEDICARE

## 2020-11-06 VITALS
TEMPERATURE: 97 F | RESPIRATION RATE: 16 BRPM | WEIGHT: 160 LBS | SYSTOLIC BLOOD PRESSURE: 112 MMHG | DIASTOLIC BLOOD PRESSURE: 58 MMHG | BODY MASS INDEX: 24.25 KG/M2 | OXYGEN SATURATION: 97 % | HEIGHT: 68 IN | HEART RATE: 87 BPM

## 2020-11-06 LAB
ANION GAP SERPL CALCULATED.3IONS-SCNC: 6 MMOL/L (ref 3–14)
BUN SERPL-MCNC: 3 MG/DL (ref 7–30)
CALCIUM SERPL-MCNC: 8.2 MG/DL (ref 8.5–10.1)
CHLORIDE SERPL-SCNC: 110 MMOL/L (ref 94–109)
CO2 SERPL-SCNC: 26 MMOL/L (ref 20–32)
CREAT SERPL-MCNC: 0.76 MG/DL (ref 0.52–1.04)
ERYTHROCYTE [DISTWIDTH] IN BLOOD BY AUTOMATED COUNT: 18.6 % (ref 10–15)
GFR SERPL CREATININE-BSD FRML MDRD: 89 ML/MIN/{1.73_M2}
GLUCOSE SERPL-MCNC: 103 MG/DL (ref 70–99)
HCT VFR BLD AUTO: 28.9 % (ref 35–47)
HGB BLD-MCNC: 8.5 G/DL (ref 11.7–15.7)
MAGNESIUM SERPL-MCNC: 2 MG/DL (ref 1.6–2.3)
MCH RBC QN AUTO: 26.8 PG (ref 26.5–33)
MCHC RBC AUTO-ENTMCNC: 29.4 G/DL (ref 31.5–36.5)
MCV RBC AUTO: 91 FL (ref 78–100)
PHOSPHATE SERPL-MCNC: 3.7 MG/DL (ref 2.5–4.5)
PLATELET # BLD AUTO: 360 10E9/L (ref 150–450)
POTASSIUM SERPL-SCNC: 3.5 MMOL/L (ref 3.4–5.3)
RBC # BLD AUTO: 3.17 10E12/L (ref 3.8–5.2)
SODIUM SERPL-SCNC: 142 MMOL/L (ref 133–144)
WBC # BLD AUTO: 8.9 10E9/L (ref 4–11)

## 2020-11-06 PROCEDURE — 250N000013 HC RX MED GY IP 250 OP 250 PS 637: Performed by: STUDENT IN AN ORGANIZED HEALTH CARE EDUCATION/TRAINING PROGRAM

## 2020-11-06 PROCEDURE — 250N000011 HC RX IP 250 OP 636: Performed by: STUDENT IN AN ORGANIZED HEALTH CARE EDUCATION/TRAINING PROGRAM

## 2020-11-06 PROCEDURE — 83735 ASSAY OF MAGNESIUM: CPT | Performed by: STUDENT IN AN ORGANIZED HEALTH CARE EDUCATION/TRAINING PROGRAM

## 2020-11-06 PROCEDURE — 36415 COLL VENOUS BLD VENIPUNCTURE: CPT | Performed by: STUDENT IN AN ORGANIZED HEALTH CARE EDUCATION/TRAINING PROGRAM

## 2020-11-06 PROCEDURE — 250N000011 HC RX IP 250 OP 636: Performed by: SURGERY

## 2020-11-06 PROCEDURE — 84100 ASSAY OF PHOSPHORUS: CPT | Performed by: STUDENT IN AN ORGANIZED HEALTH CARE EDUCATION/TRAINING PROGRAM

## 2020-11-06 PROCEDURE — 80048 BASIC METABOLIC PNL TOTAL CA: CPT | Performed by: STUDENT IN AN ORGANIZED HEALTH CARE EDUCATION/TRAINING PROGRAM

## 2020-11-06 PROCEDURE — 85027 COMPLETE CBC AUTOMATED: CPT | Performed by: STUDENT IN AN ORGANIZED HEALTH CARE EDUCATION/TRAINING PROGRAM

## 2020-11-06 PROCEDURE — C9113 INJ PANTOPRAZOLE SODIUM, VIA: HCPCS | Performed by: STUDENT IN AN ORGANIZED HEALTH CARE EDUCATION/TRAINING PROGRAM

## 2020-11-06 RX ORDER — HEPARIN SODIUM (PORCINE) LOCK FLUSH IV SOLN 100 UNIT/ML 100 UNIT/ML
5 SOLUTION INTRAVENOUS
Status: DISCONTINUED | OUTPATIENT
Start: 2020-11-06 | End: 2020-11-06 | Stop reason: HOSPADM

## 2020-11-06 RX ORDER — LEVOFLOXACIN 25 MG/ML
500 SOLUTION ORAL DAILY
Qty: 140 ML | Refills: 0 | Status: SHIPPED | OUTPATIENT
Start: 2020-11-06 | End: 2020-11-13

## 2020-11-06 RX ORDER — METHOCARBAMOL 500 MG/1
500 TABLET, FILM COATED ORAL 4 TIMES DAILY PRN
Qty: 20 TABLET | Refills: 0 | Status: SHIPPED | OUTPATIENT
Start: 2020-11-06 | End: 2020-11-06

## 2020-11-06 RX ORDER — SUMATRIPTAN 100 MG/1
100 TABLET, FILM COATED ORAL
Status: DISCONTINUED | OUTPATIENT
Start: 2020-11-06 | End: 2020-11-06 | Stop reason: HOSPADM

## 2020-11-06 RX ORDER — HEPARIN SODIUM,PORCINE 10 UNIT/ML
5-10 VIAL (ML) INTRAVENOUS
Status: DISCONTINUED | OUTPATIENT
Start: 2020-11-06 | End: 2020-11-06 | Stop reason: HOSPADM

## 2020-11-06 RX ORDER — CYCLOBENZAPRINE HCL 10 MG
10 TABLET ORAL EVERY 8 HOURS PRN
Qty: 20 TABLET | Refills: 0 | Status: ON HOLD | OUTPATIENT
Start: 2020-11-06 | End: 2020-12-07

## 2020-11-06 RX ORDER — OXYCODONE HYDROCHLORIDE 5 MG/1
5-10 TABLET ORAL EVERY 6 HOURS PRN
Qty: 30 TABLET | Refills: 0 | Status: SHIPPED | OUTPATIENT
Start: 2020-11-06 | End: 2020-11-11

## 2020-11-06 RX ORDER — HEPARIN SODIUM,PORCINE 10 UNIT/ML
5-10 VIAL (ML) INTRAVENOUS EVERY 24 HOURS
Status: DISCONTINUED | OUTPATIENT
Start: 2020-11-06 | End: 2020-11-06 | Stop reason: HOSPADM

## 2020-11-06 RX ORDER — LIDOCAINE 40 MG/G
CREAM TOPICAL
Status: DISCONTINUED | OUTPATIENT
Start: 2020-11-06 | End: 2020-11-06 | Stop reason: HOSPADM

## 2020-11-06 RX ADMIN — ENOXAPARIN SODIUM 40 MG: 40 INJECTION SUBCUTANEOUS at 08:48

## 2020-11-06 RX ADMIN — OXYCODONE HYDROCHLORIDE 10 MG: 5 TABLET ORAL at 06:34

## 2020-11-06 RX ADMIN — SUMATRIPTAN SUCCINATE 100 MG: 100 TABLET ORAL at 06:33

## 2020-11-06 RX ADMIN — HYDROXYZINE HYDROCHLORIDE 25 MG: 25 TABLET, FILM COATED ORAL at 08:47

## 2020-11-06 RX ADMIN — Medication 5 ML: at 09:47

## 2020-11-06 RX ADMIN — Medication 5 ML: at 08:47

## 2020-11-06 RX ADMIN — ONDANSETRON 4 MG: 2 INJECTION INTRAMUSCULAR; INTRAVENOUS at 00:35

## 2020-11-06 RX ADMIN — CYCLOBENZAPRINE 10 MG: 10 TABLET, FILM COATED ORAL at 08:47

## 2020-11-06 RX ADMIN — CITALOPRAM HYDROBROMIDE 20 MG: 20 TABLET ORAL at 08:47

## 2020-11-06 RX ADMIN — OXYCODONE HYDROCHLORIDE 10 MG: 5 TABLET ORAL at 00:35

## 2020-11-06 RX ADMIN — PANTOPRAZOLE SODIUM 40 MG: 40 INJECTION, POWDER, FOR SOLUTION INTRAVENOUS at 08:47

## 2020-11-06 RX ADMIN — ONDANSETRON 4 MG: 2 INJECTION INTRAMUSCULAR; INTRAVENOUS at 06:34

## 2020-11-06 ASSESSMENT — ACTIVITIES OF DAILY LIVING (ADL)
ADLS_ACUITY_SCORE: 12

## 2020-11-06 NOTE — PLAN OF CARE
"Time: 2300 - 0730  Reason for Admission: Nausea and vomiting   Vitals: /59   Pulse 96   Temp 100.6  F (38.1  C) (Oral)   Resp 18   Ht 1.727 m (5' 8\")   Wt 72.6 kg (160 lb)   LMP 09/23/2014   SpO2 97%   BMI 24.33 kg/m       Activity: SBA when ambulating.  Pain: PRN oxy given x 2 for pain.  Nausea: PRN zofran given x 2 for nausea.   Neuro: A&O x 4. Bilateral neuropathy in hands and feet. Calm and cooperative with cares. Calls appropriately.     Cardiac: Intermittently tachycardic. Denies cardiac chest pain.  Respiratory: LS clear. On RA sating 97%. Denies SOB.   GI/: Voiding spontaneously - adequate amounts. BS+. No BM this shift.   Diet: Full liquid diet  Skin: Midline abdominal incision dermabonded - YOSEF.  LDAs: (R) chest port infusing TKO - team paged to order heparin.   New change for this shift: Pt c/o migraine this AM. Team notified and imitrex given.   Plan: Continue with POC.     "

## 2020-11-06 NOTE — PLAN OF CARE
"/72   Pulse 104   Temp 100.3  F (37.9  C) (Axillary)   Resp 18   Ht 1.727 m (5' 8\")   Wt 72.6 kg (160 lb)   LMP 09/23/2014   SpO2 97%   BMI 24.33 kg/m    Pt arrived on unit @1920 - VSS on RA, Chest port infusing D5-1/2NS@100/hr. DONOVAN KRAMER paged for PTA elavil to be ordered @[facility]. Pain manageable with current meds.  "

## 2020-11-06 NOTE — PROGRESS NOTES
Surgical Oncology Progress Note    Interval History:  Migraine overnight, started on robaxin and atarax prn    Physical Exam:   Temp:  [99.5  F (37.5  C)-100.6  F (38.1  C)] 99.5  F (37.5  C)  Pulse:  [] 95  Resp:  [16-18] 18  BP: (112-141)/(59-76) 114/64  SpO2:  [95 %-98 %] 95 %  General: Alert, oriented, appears comfortable, NAD.  Respiratory: breathing non labored  Abdomen: Abdomen is soft, non-tender, non-distended. Incision healing, no sign of infection    Data:   All laboratory and imaging data in the past 24 hours reviewed  I/O last 3 completed shifts:  In: 2693.33 [P.O.:180; I.V.:1513.33; IV Piggyback:1000]  Out: 350 [Urine:350]  Recent Labs   Lab Test 11/05/20  1023 11/04/20  1646 10/25/20  0655 10/24/20  0731 07/27/20  1015 07/27/20  1015 07/20/20  1509 07/20/20  1509   WBC  --  8.6 7.3 10.2   < >  --    < > 25.4*   HGB  --  8.9* 7.6* 7.5*   < >  --    < > 8.8*   PLT  --  401 286 238   < >  --    < > 102*   INR 1.07  --   --   --   --  1.32*  --  1.20*    < > = values in this interval not displayed.      Recent Labs   Lab Test 11/05/20  0204 11/04/20  1646 10/25/20  0655 10/24/20  0731   NA  --  140 139 138   POTASSIUM 3.7 3.8 3.3* 3.5   CHLORIDE  --  108 105 105   CO2  --  25 30 28   BUN  --  11 4* 5*   CR  --  0.86 0.69 0.61   ANIONGAP  --  7 4 6   PETRA  --  8.7 8.0* 8.1*   GLC  --  93 86 95        Recent Labs   Lab Test 11/04/20 1646   PROTTOTAL 6.6*   ALBUMIN 2.4*   BILITOTAL 0.3   ALKPHOS 199*   AST 20   ALT 16       Assessment and Plan:     55yoF history of Whipple and now s/p subtotal gastrectomy with David-en-Y GJ on 10/21/20 for gastric cancer, readmitted for workup of PO intolerance.  No evidence of leak on upper GI or follow up noncontrast CT.  Perisplenic fluid collection was discussed with IR who recommended against drainage.    -Full liquids  -Lovenox  -Home amitriptyline and citalopram for depression, home imitrex for migraines  -If tolerate PO possible discharge home later today  -7  day course of PO antibiotics for fluid collection    Seen with Chief resident who will discuss with Staff.     Kwaku Rainey MD   Surgical Oncology

## 2020-11-06 NOTE — DISCHARGE SUMMARY
[facility] Discharge Summary    Nathalie Bashir MRN# [id number]   Age: 55 year old YOB: 1965     Date of Admission:  11/4/2020  Date of Discharge::  No discharge date for patient encounter.  Admitting Physician:  Tosha Du MD  Discharge Physician:  Tosha Du MD     PCP:  Marianne Gonzalez    Disposition: Patient discharged to home in stable condition.    Admission Diagnosis:  Nausea and vomiting  Gastric cancer  Chronic pain  Continuous opioid dependence  Major depressive disorder  Migraines  Sacroiliitis  Allergic rhinitis  Bursitis  Chronic neck pain  Iron deficiency anemia  History of crohns    Discharge Diagnosis:  Nausea and vomiting  Intraabdominal fluid collection  Gastric cancer  Chronic pain  Continuous opioid dependence  Major depressive disorder  Migraines  Sacroiliitis  Allergic rhinitis  Bursitis  Chronic neck pain  History of Crohns     Discharge medications  Current Discharge Medication List      START taking these medications    Details   levofloxacin (LEVAQUIN) 25 MG/ML solution Take 20 mLs (500 mg) by mouth daily for 7 days  Qty: 140 mL, Refills: 0    Associated Diagnoses: Intra-abdominal fluid collection      metroNIDAZOLE (FLAGYL) 50 mg/mL SUSP Take 10 mLs (500 mg) by mouth 3 times daily for 7 days  Qty: 210 mL, Refills: 0    Associated Diagnoses: Intra-abdominal fluid collection         CONTINUE these medications which have CHANGED    Details   cyclobenzaprine (FLEXERIL) 10 MG tablet Take 1 tablet (10 mg) by mouth every 8 hours as needed for muscle spasms  Qty: 20 tablet, Refills: 0    Associated Diagnoses: Postoperative pain      oxyCODONE (ROXICODONE) 5 MG tablet Take 1-2 tablets (5-10 mg) by mouth every 6 hours as needed for moderate to severe pain  Qty: 30 tablet, Refills: 0    Associated Diagnoses: Postoperative pain         CONTINUE these medications which have NOT CHANGED    Details   amitriptyline (ELAVIL) 25 MG tablet Take  75 mg by mouth At Bedtime       citalopram (CELEXA) 20 MG tablet Take 20 mg by mouth every morning       enoxaparin ANTICOAGULANT (LOVENOX) 40 MG/0.4ML syringe Inject 0.4 mLs (40 mg) Subcutaneous every 24 hours for 23 days  Qty: 9.2 mL, Refills: 0    Associated Diagnoses: Deep vein thrombosis (DVT) prophylaxis prescribed at discharge      hydrOXYzine (ATARAX) 25 MG tablet Take 25 mg by mouth 3 times daily as needed for itching (allergies)      ibuprofen (ADVIL/MOTRIN) 600 MG tablet Take 1 tablet (600 mg) by mouth every 6 hours as needed for moderate pain  Qty: 50 tablet, Refills: 0    Associated Diagnoses: Postoperative pain      loperamide (IMODIUM) 2 MG capsule Take 4 mg by mouth 4 times daily as needed for diarrhea       loratadine-pseudoePHEDrine (CLARITIN-D 24-HOUR)  MG 24 hr tablet Take 1 tablet by mouth daily      ondansetron (ZOFRAN) 8 MG tablet Take 1 tablet (8 mg) by mouth every 8 hours as needed (Nausea/Vomiting)  Qty: 90 tablet, Refills: 3    Associated Diagnoses: Gastric adenocarcinoma (H)      pantoprazole (PROTONIX) 40 MG EC tablet TAKE ONE TABLET BY MOUTH TWICE A DAY  Qty: 60 tablet, Refills: 1    Associated Diagnoses: IPMN (intraductal papillary mucinous neoplasm); Malignant neoplasm of pyloric antrum (H); Abdominal pain, epigastric      polyethylene glycol (MIRALAX) 17 g packet Take 17 g by mouth daily as needed for constipation  Qty: 14 packet, Refills: 0    Associated Diagnoses: Malignant neoplasm of overlapping sites of stomach (H)      potassium chloride ER (KLOR-CON M) 20 MEQ CR tablet TAKE ONE TABLET BY MOUTH ONCE DAILY  Qty: 30 tablet, Refills: 1    Associated Diagnoses: Hypokalemia      simethicone 80 MG TABS Take 1 tablet by mouth every 6 hours as needed (bloating, gas, belching)  Qty: 90 tablet, Refills: 3    Associated Diagnoses: Other acute gastritis without hemorrhage; Bloating      SUMAtriptan (IMITREX) 100 MG tablet Take 100 mg by mouth at onset of headache for migraine        "    Follow up, Special Instructions:  After Care     Future Labs/Procedures    Activity     Comments:    Your activity upon discharge: No lifting more than 10-15 lbs. Frequent ambulation recommended.    Diet     Comments:    Follow this diet upon discharge: full liquid diet.    Discharge Instructions     Comments:    If your travel plans upon discharge include prolonged periods of sitting (a lengthy car or plane ride), it is highly beneficial to get up and walk at least once per hour to help prevent swelling and blood clots.     You may shower after operation, let warm soapy water run over incision and pat dry. Do not submerge, soak, or scrub incision.    Take incentive spirometer home for continued frequent use    You will be discharged with narcotic pain medications. Please take them only as needed and do not operate a car or heavy machinery for 24 hours after taking them.  Narcotic pain medications like oxycodone are constipating. Please ensure that you are drinking adequate amounts of fluids and taking stool softeners while you are taking narcotics. Take Miralax as needed for constipation.     Do not drive until you can with stand pressing the brake pedal quickly and fully without pain and you are not distracted by pain.     Call for fever greater than 101.5, chills, red skin around incision, drainage from incision, increased swelling from the incision, bleeding from the incision that is not controlled with light pressure, inability to tolerate diet,new nausea/vomiting or other new/worsening symptoms.   You may also call the surgical oncology nurse care coordinator from 8:00am-4:30pm MICHAEL Ace at [phone]. For after hours questions or concerns you can contact the on-call surgical oncology resident (nights and weekends [phone] and ask \"I would like to page the Surgical Oncology Resident on call.\"). In emergencies, call [phone]    Diet:  - 4-6 small meals a day. You will need to snack throughout the " day rather than 3 big meals a day.   - Once you begin to feel full, you should STOP. Do not over eat, this can lead to abdominal pain and vomiting.   - Make sure to sit up when you are eating. Do not lay down after eating for at least 1 hour.   - Any time you are eating you should be sitting up in a chair, do not eat in bed.     - Avoid red meat at this time. This is difficult to digest.   - If you do get nauseous during eating or after you should stop. Give it time and only proceed with liquid diet for a day before trying solids again.   - Daily nutrition shakes, high in protein, high in calorie. These help meet daily calorie intake.   - Broths/soups. If soup as noodles, vegetables or meats he should avoid these. Eat broth only. - Be cautious with broths as they can cause reflux.   - Make sure to drink plenty of water. Best to carry a water bottle with you and sip throughout the day.   - Food examples: Cream of wheat, broths, juices, smoothies, nutritions shakes, teas, popsicles, ice cream.     Follow Up:  Follow up in clinic with Dr. Mallory as scheduled. You should be called to make an appointment within 3 business days. If you are not contacted, call [phone] to make an appointment.        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  She went neoadjuvant chemotherapy followed by subtotal gastrectomy 10/21. She presented to the Emergency Department with nausea and vomiting.      Hospital Course:  She presented to the Emergency Department with nausea and vomiting. CT scan demonstrated a fluid collection in left upper quadrant, likely hematoma. Upper GI negative for leak. Interventional Radiology was consulted and no drainage was recommended. Her tolerated diet advancement to full liquid diet. Her labs were within normal  limits. She was discharged to home the following day. She was discharged with 7 days of levofloxacin and flagyl.     Pia Oneil PA-C

## 2020-11-06 NOTE — PLAN OF CARE
"/72   Pulse 104   Temp 100.3  F (37.9  C) (Axillary)   Resp 18   Ht 1.727 m (5' 8\")   Wt 72.6 kg (160 lb)   LMP 09/23/2014   SpO2 97%   BMI 24.33 kg/m    Reason for admission: N/V - CT showed fluid collection near incision (subtotal gastrectomy in October)  Pain/Nausea: robaxin added for breakthrough pain; intermittent nausea - declined antiemetic  Mobility: SBA - unsteady d/t neuropathy  Diet: FLD, but requesting mostly clears - tolerated well  Labs: reviewed  Lines: PIV infusing D5 1/2 NS@100/hr  Drains: none  Skin/Incisions: see skin note  Neuro: A&Ox4  Respiratory: WDL  Cardiac: WDL  GI: LBM 11/4, BS+, passing gas  : voiding spont, AUOP  New Changes: transferred from ED  Plan: continue POC  " 3.178 3.24

## 2020-11-06 NOTE — PLAN OF CARE
Admitted/transferred from: PACU  2 RN full   skin assessment completed by Carla Cisneros RN and Ashley Rice RN.  Skin assessment finding: issues found midline incision dermabonded, scab to R shin, blanchable redness to coccyx, blanchable redness to right 1st toe   Interventions/actions: skin interventions pressure injury prevention reinforced     Will continue to monitor.

## 2020-11-06 NOTE — PLAN OF CARE
"  /58 (BP Location: Left arm)   Pulse 87   Temp 97  F (36.1  C) (Oral)   Resp 16   Ht 1.727 m (5' 8\")   Wt 72.6 kg (160 lb)   LMP 09/23/2014   SpO2 97%   BMI 24.33 kg/m      Time: 4573-2644.  Reason for admission: Nausea, vomiting.     VS: VSS on RA with O2 sats in high 90s. Afebrile.   Activity: Up with SBA, steady on feet. Calls appropriately.   Neuros: A&Ox4. Neuros intact except bilateral baseline neuropathy in hands and feet. C/o abdominal pain managed with PRN PO Atarax and Flexeril.   Cardiac: WDL. Normotensive, 110s/50s. HR stable in 80s. Denies chest pain.    Respiratory: LS clear. Denies SOB or [place]ZM[place]N. Stable on RA. No cough noted. IS encouraged.   GI/: Voiding without difficulty. No BM on this shift, LBM 11/4. +BS, +gas. Denies nausea or vomiting.   Diet: Full liquid diet, tolerating well.   Skin: Midline abdominal incision closed with dermabond, CDI, YOSEF. Bruising on abdomen.    Lines: Right port heparin locked.   Labs: WDL.     New changes this shift/Plan: VSS on RA, afebrile. Up with SBA. Pain well controlled with current regimen. Tolerating full liquid diet, denies nausea or vomiting. Medically cleared for discharge home. Port flushed with heparin and deaccessed. AVS reviewed with pt and pt's spouse. Instructed to stop at pharmacy prior to leaving hospital. Belongings gathered by pt and pt's spouse. Pt left via wheelchair around 1015.   Will continue to monitor & follow POC.  "

## 2020-11-07 ENCOUNTER — PATIENT OUTREACH (OUTPATIENT)
Dept: CARE COORDINATION | Facility: CLINIC | Age: 55
End: 2020-11-07

## 2020-11-09 ENCOUNTER — PATIENT OUTREACH (OUTPATIENT)
Dept: GASTROENTEROLOGY | Facility: CLINIC | Age: 55
End: 2020-11-09

## 2020-11-09 NOTE — PROGRESS NOTES
RN Care Coordination Post Operative Note  Surgical Oncology    Called  in f/u to recent admission for nausea and vomiting s/p [place]    Health Status:    Fevers or Chills:  none    Nausea no  Vomiting no  Diet: full liquids    Pain: 2 on a scale of one to ten  Description: incisional  Medication: Oxycodone  Relief:yes    Incision: clean dry and intact without edema    Discussion:  Nathalie called this am to discuss possible advance of diet to soft foods.       Patient has our contact information and I have asked that they call with any further questions or concerns.    Plan: Per Dr. Mallory she can advance slowly to soft diet; also confirmed follow up appointment with patient and she is aware that this may be a possible virtual visit    Carla LAWSONN, HNBC, STAR-T  RN Care Coordinator  Surgical Oncology  Ph: [phone]  FAX: [phone]

## 2020-11-11 ENCOUNTER — PATIENT OUTREACH (OUTPATIENT)
Dept: PALLIATIVE CARE | Facility: CLINIC | Age: 55
End: 2020-11-11

## 2020-11-11 DIAGNOSIS — G89.18 POSTOPERATIVE PAIN: ICD-10-CM

## 2020-11-12 ENCOUNTER — VIRTUAL VISIT (OUTPATIENT)
Dept: PALLIATIVE CARE | Facility: CLINIC | Age: 55
End: 2020-11-12
Attending: INTERNAL MEDICINE
Payer: MEDICARE

## 2020-11-12 DIAGNOSIS — C16.9 GASTRIC ADENOCARCINOMA (H): ICD-10-CM

## 2020-11-12 DIAGNOSIS — G89.18 POSTOPERATIVE PAIN: Primary | ICD-10-CM

## 2020-11-12 PROCEDURE — 99213 OFFICE O/P EST LOW 20 MIN: CPT | Mod: 95 | Performed by: FAMILY MEDICINE

## 2020-11-12 RX ORDER — OXYCODONE HYDROCHLORIDE 5 MG/1
5-10 TABLET ORAL EVERY 6 HOURS PRN
Qty: 60 TABLET | Refills: 0 | Status: SHIPPED | OUTPATIENT
Start: 2020-11-12 | End: 2020-11-23

## 2020-11-12 NOTE — PROGRESS NOTES
"Nathalie Bashir is a 55 year old female who is being evaluated via a billable video visit.      The patient has been notified of following:     \"This video visit will be conducted via a call between you and your physician/provider. We have found that certain health care needs can be provided without the need for an in-person physical exam.  This service lets us provide the care you need with a video conversation.  If a prescription is necessary we can send it directly to your pharmacy.  If lab work is needed we can place an order for that and you can then stop by our lab to have the test done at a later time.    Video visits are billed at different rates depending on your insurance coverage.  Please reach out to your insurance provider with any questions.    If during the course of the call the physician/provider feels a video visit is not appropriate, you will not be charged for this service.\"    Patient has given verbal consent for Video visit? Yes  How would you like to obtain your AVS? MyChart  If you are dropped from the video visit, the video invite should be resent to: Text to cell phone: [phone]  Will anyone else be joining your video visit? No      I have reviewed and updated the patient's allergies and medication list.    Concerns: No new concerns.   Refills: None needed.        Vitals - Patient Reported  Weight (Patient Reported): 72.6 kg (160 lb)  Height (Patient Reported): 172.7 cm (5' 8\")  BMI (Based on Pt Reported Ht/Wt): 24.33  Pain Loc: Other - see comment(NEUROPATHY IN HANDS AND FEET, RADIATING UP INTO SHINS)      Astrid Couch CMA      Video-Visit Details    Type of service:  Video Visit    Video Start Time: 4:02 PM  Video End Time: 4:24 PM    Originating Location (pt. Location): Home    Distant Location (provider location):  [facility]     Platform used for Video Visit: Miguel A Marie DO    Palliative Care Outpatient Clinic Progress Note    Patient Name:  " Nathalie Bashir  Primary Provider:  Marianne Gonzalez    Chief Complaint: follow-up post-op pain management    Interim History:  Nathalie Bashir 55 year old female returns to be seen by palliative care today. She has a history significant for gastric adenocarcinoma found during Whipple in 2020 for presumed IPMN, no pancreatic malignancy found at that time. S/p neoadjuvant chemotherapy and surgery with curative intent on 10/21/20. Surgery was extensive and included partial omentectomy, resection of GJ anastomosis with en bloc subtotal gastrectomy, modified lymphadenectomy, and David-en-Y Gastrojejunostomy Reconstruction.     She is seen today for recommendations on how to taper her oxycodone with the goal of returning to her prior dose of oxycodone 5 mg BID. She feels that her pain is currently well controlled.  Since our last visit, she was again admitted for evaluation and treatment of pain related to a fluid collection/hematoma postoperatively.  She reports that she is currently taking oxycodone 10 mg three times per day on average. Her pain continues to be on the left side under the edge of her rib, and is continuing to improve.     She denies any other concerning symptoms at this time.  She has not had any difficulty with ongoing constipation.     Social History     Tobacco Use     Smoking status: Former Smoker     Packs/day: 0.50     Years: 25.00     Pack years: 12.50     Types: Cigarettes     Quit date: 2020     Years since quittin.8     Smokeless tobacco: Never Used   Substance Use Topics     Alcohol use: No     Drug use: No         Allergies   Allergen Reactions     Gluten Meal Palpitations     Augmentin Rash     Patient tolerated Zosyn in 2020     Oxycontin [Oxycodone]      Confusion, loopy, oxycodone is tolerated     Pregabalin      interfered with Cymbalta     Topiramate      Tongue numbness, taste alteration, irritable      Acetaminophen Nausea     Urine retention       Dust Mite Extract       Gabapentin Dizziness and GI Disturbance     Ketorolac Headache     Latex Rash     Methadone Fatigue     Mold      Naprosyn [Naproxen] Dizziness and GI Disturbance     Seasonal Allergies      Current Outpatient Medications   Medication Sig Dispense Refill     amitriptyline (ELAVIL) 25 MG tablet Take 75 mg by mouth At Bedtime        citalopram (CELEXA) 20 MG tablet Take 20 mg by mouth every morning        cyclobenzaprine (FLEXERIL) 10 MG tablet Take 1 tablet (10 mg) by mouth every 8 hours as needed for muscle spasms 20 tablet 0     enoxaparin ANTICOAGULANT (LOVENOX) 40 MG/0.4ML syringe Inject 0.4 mLs (40 mg) Subcutaneous every 24 hours for 23 days 9.2 mL 0     hydrOXYzine (ATARAX) 25 MG tablet Take 25 mg by mouth 3 times daily as needed for itching (allergies)       ibuprofen (ADVIL/MOTRIN) 600 MG tablet Take 1 tablet (600 mg) by mouth every 6 hours as needed for moderate pain 50 tablet 0     loperamide (IMODIUM) 2 MG capsule Take 4 mg by mouth 4 times daily as needed for diarrhea        loratadine-pseudoePHEDrine (CLARITIN-D 24-HOUR)  MG 24 hr tablet Take 1 tablet by mouth daily       ondansetron (ZOFRAN) 8 MG tablet Take 1 tablet (8 mg) by mouth every 8 hours as needed (Nausea/Vomiting) 90 tablet 3     oxyCODONE (ROXICODONE) 5 MG tablet Take 1-2 tablets (5-10 mg) by mouth every 6 hours as needed for moderate to severe pain 60 tablet 0     pantoprazole (PROTONIX) 40 MG EC tablet TAKE ONE TABLET BY MOUTH TWICE A DAY 60 tablet 1     polyethylene glycol (MIRALAX) 17 g packet Take 17 g by mouth daily as needed for constipation 14 packet 0     potassium chloride ER (KLOR-CON M) 20 MEQ CR tablet TAKE ONE TABLET BY MOUTH ONCE DAILY 30 tablet 1     simethicone 80 MG TABS Take 1 tablet by mouth every 6 hours as needed (bloating, gas, belching) 90 tablet 3     SUMAtriptan (IMITREX) 100 MG tablet Take 100 mg by mouth at onset of headache for migraine       Past Medical History:   Diagnosis Date     Allergic rhinitis       Cancer (H)     stomach cancer     Depression      Lumbago      Migraine      Other chronic pain     low back     Sacroiliitis (H)     low back pain     Trochanteric bursitis     leg length discrrepancy, hip pain     Past Surgical History:   Procedure Laterality Date     ARTHROPLASTY HIP Left 2016     BACK SURGERY      L4-5 decompression     C REPAIR DETACH RETINA,SCLERAL BUCKLE       CATARACT IOL, RT/LT       CHOLECYSTECTOMY N/A 1/28/2020    Procedure: Cholecystectomy;  Surgeon: Yandel Mallory MD;  [facility]     ENDOSCOPIC RETROGRADE CHOLANGIOPANCREATOGRAM N/A 7/27/2020    Procedure: ENDOSCOPIC RETROGRADE CHOLANGIOPANCREATOGRAPHY  **Latex Allergy** with jejunal biopsies;  Surgeon: Jeet Aviles MD;  [facility]     ESOPHAGOSCOPY, GASTROSCOPY, DUODENOSCOPY (EGD), COMBINED N/A 3/3/2020    Procedure: ESOPHAGOGASTRODUODENOSCOPY, WITH ENDOSCOPIC US (enoxaparin);  Surgeon: Bakari Plata MD;  [facility]     EYE SURGERY       GASTRECTOMY N/A 10/21/2020    Procedure: Open subtotal gastectomy with David en Y Reconstruction; D1 Lymph Node Disection  **Latex Allergy**;  Surgeon: Yandel Mallory MD;  [facility]     IR CHEST PORT PLACEMENT > 5 YRS OF AGE  3/26/2020     LAPAROSCOPY DIAGNOSTIC (GENERAL) N/A 10/13/2020    Procedure: Diagnostic laparoscopy with peritoneal washings  **Latex Allergy**;  Surgeon: Yandel Mallory MD;  [facility]     OPEN REDUCTION INTERNAL FIXATION RODDING INTRAMEDULLARY FEMUR Left 12/23/2014    Procedure: OPEN REDUCTION INTERNAL FIXATION RODDING INTRAMEDULLARY FEMUR;  Surgeon: Arnol Santiago MD;  [facility]     ORTHOPEDIC SURGERY       PICC DOUBLE LUMEN PLACEMENT Right 02/07/2020    5 fr double lumen 41 cm     REMOVE HARDWARE LOWER EXTREMITY Left 4/7/2015    Procedure: REMOVE HARDWARE LOWER EXTREMITY;  Surgeon: Arnol Santiago MD;  [facility]     REMOVE HARDWARE RODDING INTRAMEDULLARY FEMUR Left 12/17/2015    Procedure: REMOVE HARDWARE RODDING  INTRAMEDULLARY FEMUR;  Surgeon: Arnol Santiago MD;  [facility]     RESECT BONE LOWER EXTREMITY Left 12/23/2014    Procedure: RESECT BONE LOWER EXTREMITY;  Surgeon: Arnol Santiago MD;  [facility]     WHIPPLE PROCEDURE N/A 1/28/2020    Procedure: Open Whipple procedure and Cholecystectomy;  Surgeon: Yandel Mallory MD;  [facility]       Review of Systems:   ROS: 10 point ROS neg other than the symptoms noted above in the HPI and pertinents here:  Palliative Symptom Review (0=no symptom/no concern, 1=mild, 2=moderate, 3=severe):      Pain: 1      Fatigue: 0      Nausea: 0      Constipation: 0      Diarrhea: 1      Depressive Symptoms: 0      Anxiety: 0      Drowsiness: 0      Poor Appetite: 0      Shortness of Breath: 0      Insomnia: 0      Other: 0      Overall (0 good/no concerns, 3 very poor):  1           Physical Exam:   Completed by video visit. Well-developed, well-nourished female, comfortable in appearance at time of evaluation.  In no acute distress. Non-labored breathing. Alert, oriented, responds to all questions appropriately.  Normal mood and affect.     Key Data Reviewed:  LABS: Cr 0.76, GFR 89, Hemoglobin 8.5, platelet count 360  IMAGING: CT Abd/Pelvis 11/5/20: no evidence of contrast leak, early abscess vs. Postoperative seroma in left upper quadrant, inflammatory changes following recent surgery, sigmoid bowel thickening, small left pleural effusion    Impression & Recommendations & Counseling:  Nathalie Bashir 55 year old female returns to be seen by palliative care today; she has been followed primarily for neuropathic pain thought to be caused by chemotherapy. She has a history significant for gastric adenocarcinoma found during Whipple in 1/2020 for presumed IPMN, no pancreatic malignancy found at that time. S/p neoadjuvant chemotherapy and surgery with curative intent on 10/21/20.     She is seen today for guidance on how to taper her medications in the postoperative period.   She is currently taking oxycodone 10 mg three times per day.  Recommended decreasing to 10 mg BID x1 week, then returning to 5 mg BID PRN. She has primarily been taking the oxycodone longer term for neuropathic pain. Follow-up appointment is scheduled for 1/7/2021, or sooner if additional concerns arise.     Al Marie DO  Palliative Medicine Fellow  Staffed with Dr. Scott      Attending attestation:   Patient seen and evaluated with Dr. Marie and I agree with findings and recs in this note. Given schedule to taper oxycodone back down to pre-surgery 5 mg BID PRN.    Thank you for involving us in the patient's care.   Precious Scott MD / Palliative Medicine / Pager [phone] / After-Hours Answering Service [phone] / Main Palliative Clinic - [facility] [phone] / [facility] Inpatient Team Consult Pager [phone] (answered 8am-430pm M-F)          .

## 2020-11-12 NOTE — LETTER
"11/12/2020       RE: Nathalie Bashir  [address] [id number]     Dear Colleague,    Thank you for referring your patient, Nathalie Bashir, to the [facility] at [facility]. Please see a copy of my visit note below.    Nathalie Bashir is a 55 year old female who is being evaluated via a billable video visit.      The patient has been notified of following:     \"This video visit will be conducted via a call between you and your physician/provider. We have found that certain health care needs can be provided without the need for an in-person physical exam.  This service lets us provide the care you need with a video conversation.  If a prescription is necessary we can send it directly to your pharmacy.  If lab work is needed we can place an order for that and you can then stop by our lab to have the test done at a later time.    Video visits are billed at different rates depending on your insurance coverage.  Please reach out to your insurance provider with any questions.    If during the course of the call the physician/provider feels a video visit is not appropriate, you will not be charged for this service.\"    Patient has given verbal consent for Video visit? Yes  How would you like to obtain your AVS? MyChart  If you are dropped from the video visit, the video invite should be resent to: Text to cell phone: [phone]  Will anyone else be joining your video visit? No    I have reviewed and updated the patient's allergies and medication list.    Concerns: No new concerns.   Refills: None needed.      Vitals - Patient Reported  Weight (Patient Reported): 72.6 kg (160 lb)  Height (Patient Reported): 172.7 cm (5' 8\")  BMI (Based on Pt Reported Ht/Wt): 24.33  Pain Loc: Other - see comment(NEUROPATHY IN HANDS AND FEET, RADIATING UP INTO SHINS)    Astrid Couch CMA      Video-Visit Details    Type of service:  Video Visit    Video Start " Time: 4:02 PM  Video End Time: 4:24 PM    Originating Location (pt. Location): Home    Distant Location (provider location):  [facility]     Platform used for Video Visit: Miguel A Marie DO    Palliative Care Outpatient Clinic Progress Note    Patient Name:  Nathalie Bashir  Primary Provider:  Marianne Gonzalez    Chief Complaint: follow-up post-op pain management    Interim History:  Nathalie Bashir 55 year old female returns to be seen by palliative care today. She has a history significant for gastric adenocarcinoma found during Whipple in 2020 for presumed IPMN, no pancreatic malignancy found at that time. S/p neoadjuvant chemotherapy and surgery with curative intent on 10/21/20. Surgery was extensive and included partial omentectomy, resection of GJ anastomosis with en bloc subtotal gastrectomy, modified lymphadenectomy, and David-en-Y Gastrojejunostomy Reconstruction.     She is seen today for recommendations on how to taper her oxycodone with the goal of returning to her prior dose of oxycodone 5 mg BID. She feels that her pain is currently well controlled.  Since our last visit, she was again admitted for evaluation and treatment of pain related to a fluid collection/hematoma postoperatively.  She reports that she is currently taking oxycodone 10 mg three times per day on average. Her pain continues to be on the left side under the edge of her rib, and is continuing to improve.     She denies any other concerning symptoms at this time.  She has not had any difficulty with ongoing constipation.     Social History     Tobacco Use     Smoking status: Former Smoker     Packs/day: 0.50     Years: 25.00     Pack years: 12.50     Types: Cigarettes     Quit date: 2020     Years since quittin.8     Smokeless tobacco: Never Used   Substance Use Topics     Alcohol use: No     Drug use: No         Allergies   Allergen Reactions     Gluten Meal Palpitations     Augmentin Rash      Patient tolerated Zosyn in 7/2020     Oxycontin [Oxycodone]      Confusion, loopy, oxycodone is tolerated     Pregabalin      interfered with Cymbalta     Topiramate      Tongue numbness, taste alteration, irritable      Acetaminophen Nausea     Urine retention       Dust Mite Extract      Gabapentin Dizziness and GI Disturbance     Ketorolac Headache     Latex Rash     Methadone Fatigue     Mold      Naprosyn [Naproxen] Dizziness and GI Disturbance     Seasonal Allergies      Current Outpatient Medications   Medication Sig Dispense Refill     amitriptyline (ELAVIL) 25 MG tablet Take 75 mg by mouth At Bedtime        citalopram (CELEXA) 20 MG tablet Take 20 mg by mouth every morning        cyclobenzaprine (FLEXERIL) 10 MG tablet Take 1 tablet (10 mg) by mouth every 8 hours as needed for muscle spasms 20 tablet 0     enoxaparin ANTICOAGULANT (LOVENOX) 40 MG/0.4ML syringe Inject 0.4 mLs (40 mg) Subcutaneous every 24 hours for 23 days 9.2 mL 0     hydrOXYzine (ATARAX) 25 MG tablet Take 25 mg by mouth 3 times daily as needed for itching (allergies)       ibuprofen (ADVIL/MOTRIN) 600 MG tablet Take 1 tablet (600 mg) by mouth every 6 hours as needed for moderate pain 50 tablet 0     loperamide (IMODIUM) 2 MG capsule Take 4 mg by mouth 4 times daily as needed for diarrhea        loratadine-pseudoePHEDrine (CLARITIN-D 24-HOUR)  MG 24 hr tablet Take 1 tablet by mouth daily       ondansetron (ZOFRAN) 8 MG tablet Take 1 tablet (8 mg) by mouth every 8 hours as needed (Nausea/Vomiting) 90 tablet 3     oxyCODONE (ROXICODONE) 5 MG tablet Take 1-2 tablets (5-10 mg) by mouth every 6 hours as needed for moderate to severe pain 60 tablet 0     pantoprazole (PROTONIX) 40 MG EC tablet TAKE ONE TABLET BY MOUTH TWICE A DAY 60 tablet 1     polyethylene glycol (MIRALAX) 17 g packet Take 17 g by mouth daily as needed for constipation 14 packet 0     potassium chloride ER (KLOR-CON M) 20 MEQ CR tablet TAKE ONE TABLET BY MOUTH ONCE DAILY  30 tablet 1     simethicone 80 MG TABS Take 1 tablet by mouth every 6 hours as needed (bloating, gas, belching) 90 tablet 3     SUMAtriptan (IMITREX) 100 MG tablet Take 100 mg by mouth at onset of headache for migraine       Past Medical History:   Diagnosis Date     Allergic rhinitis      Cancer (H)     stomach cancer     Depression      Lumbago      Migraine      Other chronic pain     low back     Sacroiliitis (H)     low back pain     Trochanteric bursitis     leg length discrrepancy, hip pain     Past Surgical History:   Procedure Laterality Date     ARTHROPLASTY HIP Left 2016     BACK SURGERY      L4-5 decompression     C REPAIR DETACH RETINA,SCLERAL BUCKLE       CATARACT IOL, RT/LT       CHOLECYSTECTOMY N/A 1/28/2020    Procedure: Cholecystectomy;  Surgeon: Yandel Mallory MD;  [facility]     ENDOSCOPIC RETROGRADE CHOLANGIOPANCREATOGRAM N/A 7/27/2020    Procedure: ENDOSCOPIC RETROGRADE CHOLANGIOPANCREATOGRAPHY  **Latex Allergy** with jejunal biopsies;  Surgeon: Jeet Aviles MD;  Location:  OR     ESOPHAGOSCOPY, GASTROSCOPY, DUODENOSCOPY (EGD), COMBINED N/A 3/3/2020    Procedure: ESOPHAGOGASTRODUODENOSCOPY, WITH ENDOSCOPIC US (enoxaparin);  Surgeon: Bakari Plata MD;  [facility]     EYE SURGERY       GASTRECTOMY N/A 10/21/2020    Procedure: Open subtotal gastectomy with David en Y Reconstruction; D1 Lymph Node Disection  **Latex Allergy**;  Surgeon: Yandel Mallory MD;  [facility]     IR CHEST PORT PLACEMENT > 5 YRS OF AGE  3/26/2020     LAPAROSCOPY DIAGNOSTIC (GENERAL) N/A 10/13/2020    Procedure: Diagnostic laparoscopy with peritoneal washings  **Latex Allergy**;  Surgeon: Yandel Mallory MD;  [facility]     OPEN REDUCTION INTERNAL FIXATION RODDING INTRAMEDULLARY FEMUR Left 12/23/2014    Procedure: OPEN REDUCTION INTERNAL FIXATION RODDING INTRAMEDULLARY FEMUR;  Surgeon: Arnol Santiago MD;  Location:  OR     ORTHOPEDIC SURGERY       PICC DOUBLE LUMEN PLACEMENT Right  02/07/2020    5 fr double lumen 41 cm     REMOVE HARDWARE LOWER EXTREMITY Left 4/7/2015    Procedure: REMOVE HARDWARE LOWER EXTREMITY;  Surgeon: Arnol Santiago MD;  [facility]     REMOVE HARDWARE RODDING INTRAMEDULLARY FEMUR Left 12/17/2015    Procedure: REMOVE HARDWARE RODDING INTRAMEDULLARY FEMUR;  Surgeon: Arnol Santiago MD;  [facility]     RESECT BONE LOWER EXTREMITY Left 12/23/2014    Procedure: RESECT BONE LOWER EXTREMITY;  Surgeon: Arnol Santiago MD;  [facility]     WHIPPLE PROCEDURE N/A 1/28/2020    Procedure: Open Whipple procedure and Cholecystectomy;  Surgeon: Yandel Mallory MD;  [facility]       Review of Systems:   ROS: 10 point ROS neg other than the symptoms noted above in the HPI and pertinents here:  Palliative Symptom Review (0=no symptom/no concern, 1=mild, 2=moderate, 3=severe):      Pain: 1      Fatigue: 0      Nausea: 0      Constipation: 0      Diarrhea: 1      Depressive Symptoms: 0      Anxiety: 0      Drowsiness: 0      Poor Appetite: 0      Shortness of Breath: 0      Insomnia: 0      Other: 0      Overall (0 good/no concerns, 3 very poor):  1           Physical Exam:   Completed by video visit. Well-developed, well-nourished female, comfortable in appearance at time of evaluation.  In no acute distress. Non-labored breathing. Alert, oriented, responds to all questions appropriately.  Normal mood and affect.     Key Data Reviewed:  LABS: Cr 0.76, GFR 89, Hemoglobin 8.5, platelet count 360  IMAGING: CT Abd/Pelvis 11/5/20: no evidence of contrast leak, early abscess vs. Postoperative seroma in left upper quadrant, inflammatory changes following recent surgery, sigmoid bowel thickening, small left pleural effusion    Impression & Recommendations & Counseling:  Nathalie Bashir 55 year old female returns to be seen by palliative care today; she has been followed primarily for neuropathic pain thought to be caused by chemotherapy. She has a history significant for  gastric adenocarcinoma found during Whipple in 1/2020 for presumed IPMN, no pancreatic malignancy found at that time. S/p neoadjuvant chemotherapy and surgery with curative intent on 10/21/20.     She is seen today for guidance on how to taper her medications in the postoperative period.  She is currently taking oxycodone 10 mg three times per day.  Recommended decreasing to 10 mg BID x1 week, then returning to 5 mg BID PRN. She has primarily been taking the oxycodone longer term for neuropathic pain. Follow-up appointment is scheduled for 1/7/2021, or sooner if additional concerns arise.     Al Marie DO  Palliative Medicine Fellow  Staffed with Dr. Scott    Attending attestation:   Patient seen and evaluated with Dr. Marie and I agree with findings and recs in this note. Given schedule to taper oxycodone back down to pre-surgery 5 mg BID PRN.    Thank you for involving us in the patient's care.   Precious Scott MD / Palliative Medicine / Pager [phone] / After-Hours Answering Service [phone] / Main Palliative Clinic - [facility] [phone] / [facility] Inpatient Team Consult Pager [phone] (answered 8am-430pm M-F)

## 2020-11-12 NOTE — PROGRESS NOTES
Still having increased use of oxycodone in comparison to pre surgery  States taking 2 tabs every 6 hours.  Pre surgery on 10.21 was taking 2 tabs a day for neuropathy.      Surgery on 10.21 and then ED / overnight admission on 11.04    Surgery defers pain management to palliative.     Controlled Medication Requested:Oxycodne  Last date dispensed/ sold  per  website: 11.06.2020  Last office visit: 10.29.2020 CHERRY  Also 10.26 [place]  Next office visit:  January 2021     Recent Documentation from [place] prescription monitoring website:

## 2020-11-16 NOTE — PATIENT INSTRUCTIONS
1. Decrease oxycodone to 10 mg two times daily as needed x1 week, then return to oxycodone 5 mg two times daily as needed  2. Follow-up appointment scheduled for 1/7/2021; Please reach out to us if additional questions or concerns arise before then     Take care!  Al Marie, DO

## 2020-11-18 ASSESSMENT — ENCOUNTER SYMPTOMS
STIFFNESS: 1
JOINT SWELLING: 0
BACK PAIN: 0
BLOATING: 0
BOWEL INCONTINENCE: 0
DIARRHEA: 1
NAUSEA: 1
MUSCLE WEAKNESS: 1
JAUNDICE: 0
HEARTBURN: 0
ABDOMINAL PAIN: 1
ARTHRALGIAS: 1
VOMITING: 1
MUSCLE CRAMPS: 0
BLOOD IN STOOL: 0
MYALGIAS: 1
CONSTIPATION: 0
RECTAL PAIN: 0
NECK PAIN: 0

## 2020-11-19 ENCOUNTER — VIRTUAL VISIT (OUTPATIENT)
Dept: ONCOLOGY | Facility: CLINIC | Age: 55
End: 2020-11-19
Attending: INTERNAL MEDICINE
Payer: MEDICARE

## 2020-11-19 DIAGNOSIS — C16.8 MALIGNANT NEOPLASM OF OVERLAPPING SITES OF STOMACH (H): Primary | ICD-10-CM

## 2020-11-19 DIAGNOSIS — D64.9 ANEMIA, UNSPECIFIED TYPE: ICD-10-CM

## 2020-11-19 PROCEDURE — 99214 OFFICE O/P EST MOD 30 MIN: CPT | Mod: 95 | Performed by: INTERNAL MEDICINE

## 2020-11-19 NOTE — LETTER
"    11/19/2020         RE: Nathalie Bashir  [address] [facility] [address]        Dear Colleague,    Thank you for referring your patient, Nathalie Bashir, to the [facility]. Please see a copy of my visit note below.    Nathalie Bashir is a 55 year old female who is being evaluated via a billable video visit.      The patient has been notified of following:     \"This video visit will be conducted via a call between you and your physician/provider. We have found that certain health care needs can be provided without the need for an in-person physical exam.  This service lets us provide the care you need with a video conversation.  If a prescription is necessary we can send it directly to your pharmacy.  If lab work is needed we can place an order for that and you can then stop by our lab to have the test done at a later time.    Video visits are billed at different rates depending on your insurance coverage.  Please reach out to your insurance provider with any questions.    If during the course of the call the physician/provider feels a video visit is not appropriate, you will not be charged for this service.\"    Patient has given verbal consent for Video visit? Yes  How would you like to obtain your AVS? MyChart  If you are dropped from the video visit, the video invite should be resent to: Text to cell phone: [phone]  Will anyone else be joining your video visit? No       STEPHENIE Sanches        Video-Visit Details    Type of service:  Video Visit    Video Start Time: 10:06 AM  Video End Time: 10:23 AM    Originating Location (pt. Location): Home    Distant Location (provider location):  [facility]     Platform used for Video Visit: Padmini Pollard MD          Oncology/Hematology Visit Note  Nov 19, 2020    Reason for Visit: Follow up of gastric adenocarcinoma.    History of Present Illness:     Please see previous notes for " detail. I have copied and updated from prior notes.    Ms. Bashir is a 55 year old female with a history of gastric adenocarcinoma.  She was being followed for a pancreatic duct dilatation and pancreatic cyst which was noted in November 2018.  Since May 2019 she started noticing some nausea and vomiting and occasional abdominal discomfort.  She had an ultrasound in August 2019 which was essentially unremarkable.  She had a repeat endoscopic ultrasound on 10/29/2019 which showed increase in size of the cyst as well as dilation of the main pancreatic duct.  FNA and fluid analysis showed mucinous epithelium.  She was referred to Dr. 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  Surprisingly there was no pancreatic ductal neoplasm seen but on the resected specimen stomach cancer was noted.  It was poorly differentiated adenocarcinoma with poorly cohesive/signet ring cell type with proximal gastric mucosal and serosal margins being positive.  There was lymphovascular and perineural invasion seen.  22 lymph nodes were sampled and all were benign.  It was a pT4aN0 lesion.  HER-2/christine FISH was not amplified.         EUS on 3/3/2020 without clear evidence of neoplasm endoscopically. Left gastric lymph node fine-needle aspiration was negative for carcinoma. Biopsy from the gastric jejunal anastomosis as well as lesser curvature of the stomach also did not show any malignancy.     She had a PET/CT on 3/13/2020 which showed soft tissue streaky density with increased FDG uptake adjacent to the pancreaticojejunostomy which could be neoplastic in origin.  There is mild increased FDG uptake in the gastric remnant.  Focal area of soft tissue thickening with fatty streakiness along medial laparotomy with increased FDG uptake which likely is inflammatory. Increased FDG uptake in thoracic esophagus which could be esophagitis.      She started on chemotherapy with 5FU, oxaliplatin, and Taxotere on  3/27/20. She was seen on 4/7/20 for evaluation of fevers. She was treated for possible pneumonia with Levaquin.      She was seen in clinic 4/13/20 for RUQ pain,CT and labs reassuring. She received cycles 2 and 3 of chemotherapy on 4/20/20 and 5/4/20. She was admitted from 5/21-5/23/20 with neutropenic aspiration pneumonia. She received cycle 4 on 5/27/20 with Neulasta support. PET/CT on 6/3/20 showed mild residual uptake at the site of gastrojejunal anastomosis which likely is physiologic.  There is almost resolution of soft tissue nodule next to the pancreaticoduodenal anastomosis without FDG uptake.  There is focal increased uptake in the left posterior acetabulum which could be artifact.     Decreased dose of oxaliplatin due to neuropathy/cold sensitivity on 6/11. Decreased 5FU to 85% with cycle #6 due to mucositis/diarrhea/taste changes. Treatment was held 7/9/20 due to diarrhea, dehydration, tachycardia, rash, and weight loss and she got cycle 7 on 7/15. On 7/20 she presented to the ED with RUQ abdominal pain, and was admitted 7/20-7/29 for acute cholangitis     She received C#8 FLOT on 8/14/2020.    PET/CT on 9/2/2020 did not show evidence of disease.  Small peritoneal nodule was a stable.  This was most consistent with fat necrosis.    CT chest abdomen and pelvis on 9/23/2020 also does not show evidence of malignancy recurrence and the peritoneal nodule in the right upper quadrant is stable.  Before definite surgery she had diagnostic laparoscopy which was negative for evidence of malignancy so on 10/21/2020 she had exploratory laparotomy with extensive lysis of additions, partial omentectomy, resection of Gastrojejunal Anastomosis w/ En Bloc Subtotal Gastrectomy and modified D2 Lymphadenectomy with David-en-Y Gastrojejunostomy Reconstruction.  Final pathology showed Focal residual poorly differentiated adenocarcinoma (0.4cm) with signet-ring morphology status    post-neoadjuvant chemotherapy and all 9 lymph  nodes were benign.  All margins were negative.  It was a pT1bN0 lesion.    Before definite surgery she had diagnostic laparoscopy which was negative for evidence of malignancy so on 10/21/2020 she had exploratory laparotomy with extensive lysis of additions, partial omentectomy, resection of Gastrojejunal Anastomosis w/ En Bloc Subtotal Gastrectomy and modified D2 Lymphadenectomy with David-en-Y Gastrojejunostomy Reconstruction.  Final pathology showed Focal residual poorly differentiated adenocarcinoma (0.4cm) with signet-ring morphology status    post-neoadjuvant chemotherapy and all 9 lymph nodes were benign.  All margins were negative.  It was a pT1bN0 lesion.    She was admitted from 11/4/2020-11/6/2020 for nausea vomiting and left upper quadrant pain and CT scan showed fluid collection in the left upper quadrant, likely hematoma.  Upper GI was negative for leak.  No drainage was recommended by IR.  He was discharged on 7 days of levofloxacin and Flagyl.      Interval History:  -this is a video visit through [facility]. She still has pain in the left upper abdomen and taking oxycodone 3-4 times daily.  She is taking Zofran for nausea which helps.  She has some diarrhea which usually is in the morning and then it subsides.  She is not taking any pancreas enzymes.  Overall energy is slowly improving.  She denies any fevers or chills.  She has noticed improvement in the neuropathy in the hands but she still has tingling and numbness in her feet.  She does not believe that she has lost weight.    ECOG 2    ROS:  Rest of the comprehensive review of the system was essentially unremarkable.    I reviewed other history in epic as below.      Current Outpatient Medications   Medication Sig Dispense Refill     amitriptyline (ELAVIL) 25 MG tablet Take 75 mg by mouth At Bedtime        citalopram (CELEXA) 20 MG tablet Take 20 mg by mouth every morning        cyclobenzaprine (FLEXERIL) 10 MG tablet Take 1 tablet (10 mg) by  mouth every 8 hours as needed for muscle spasms 20 tablet 0     hydrOXYzine (ATARAX) 25 MG tablet Take 25 mg by mouth 3 times daily as needed for itching (allergies)       ibuprofen (ADVIL/MOTRIN) 600 MG tablet Take 1 tablet (600 mg) by mouth every 6 hours as needed for moderate pain 50 tablet 0     loperamide (IMODIUM) 2 MG capsule Take 4 mg by mouth 4 times daily as needed for diarrhea        loratadine-pseudoePHEDrine (CLARITIN-D 24-HOUR)  MG 24 hr tablet Take 1 tablet by mouth daily       ondansetron (ZOFRAN) 8 MG tablet Take 1 tablet (8 mg) by mouth every 8 hours as needed (Nausea/Vomiting) 90 tablet 3     oxyCODONE (ROXICODONE) 5 MG tablet Take 1-2 tablets (5-10 mg) by mouth every 6 hours as needed for moderate to severe pain 60 tablet 0     pantoprazole (PROTONIX) 40 MG EC tablet TAKE ONE TABLET BY MOUTH TWICE A DAY 60 tablet 1     polyethylene glycol (MIRALAX) 17 g packet Take 17 g by mouth daily as needed for constipation 14 packet 0     potassium chloride ER (KLOR-CON M) 20 MEQ CR tablet TAKE ONE TABLET BY MOUTH ONCE DAILY 30 tablet 1     simethicone 80 MG TABS Take 1 tablet by mouth every 6 hours as needed (bloating, gas, belching) 90 tablet 3     SUMAtriptan (IMITREX) 100 MG tablet Take 100 mg by mouth at onset of headache for migraine         Physical Examination:    [facility] 09/23/2014   Wt Readings from Last 10 Encounters:   11/04/20 72.6 kg (160 lb)   10/25/20 74 kg (163 lb 3.2 oz)   10/16/20 75.6 kg (166 lb 9.6 oz)   10/13/20 74.6 kg (164 lb 7.4 oz)   09/25/20 78 kg (172 lb)   09/25/20 77.8 kg (171 lb 9.6 oz)   09/04/20 76.1 kg (167 lb 11.2 oz)   08/21/20 75.9 kg (167 lb 6.4 oz)   08/14/20 82.6 kg (182 lb)   08/06/20 87.2 kg (192 lb 3.2 oz)       Constitutional.  Looks well and in no apparent distress.   Eyes.  Without eye redness or apparent jaundice.   Respiratory.  Non labored breathing. Speaking in full sentences.    Skin.  No concerning skin rashes on the skin visualized.   Neurological.  Is  alert and oriented.  Psychiatric.  Mood and affect seem appropriate.      The rest of a comprehensive physical examination is deferred due to Public Health Emergency video visit restrictions.      Laboratory Data:     Reviewed      Assessment and Plan:    Gastric adenocarcinona, HER-2 negative.   - Started on neoadjuvant FLOT on 3/7/2020. She has required a dose reduction of her oxaliplatin by 30% due to cold sensitivity/neuropathy with cycle #5. 5FU was decreased to 85% with cycle #6 due to mucositis/diarrhea/taste changes.   Received C#8 on 8/14/2020.  PET/CT on 9/2/2020 did not show evidence of disease.  Small peritoneal nodule was a stable.  This was most consistent with fat necrosis.    CT chest abdomen and pelvis on 9/23/2020 also does not show evidence of malignancy recurrence and the peritoneal nodule in the right upper quadrant is stable.    Before definite surgery she had diagnostic laparoscopy which was negative for evidence of malignancy so on 10/21/2020 she had exploratory laparotomy with extensive lysis of additions, partial omentectomy, resection of Gastrojejunal Anastomosis w/ En Bloc Subtotal Gastrectomy and modified D2 Lymphadenectomy with David-en-Y Gastrojejunostomy Reconstruction.  Final pathology showed Focal residual poorly differentiated adenocarcinoma (0.4cm) with signet-ring morphology status    post-neoadjuvant chemotherapy and all 9 lymph nodes were benign.  All margins were negative.  It was a pT1bN0 lesion.    Before definite surgery she had diagnostic laparoscopy which was negative for evidence of malignancy so on 10/21/2020 she had exploratory laparotomy with extensive lysis of additions, partial omentectomy, resection of Gastrojejunal Anastomosis w/ En Bloc Subtotal Gastrectomy and modified D2 Lymphadenectomy with David-en-Y Gastrojejunostomy Reconstruction.  Final pathology showed Focal residual poorly differentiated adenocarcinoma (0.4cm) with signet-ring morphology status     post-neoadjuvant chemotherapy and all 9 lymph nodes were benign.  All margins were negative.  It was a pT1bN0 lesion.    We discussed the situation in detail.  She is slowly recovering now.  She has completed 6 months of chemotherapy in the neoadjuvant setting and now has undergone definitive surgery.  We will keep her on surveillance which would be CT chest abdomen and pelvis every 6 months and EGD as needed.    Abdominal pain.  Postsurgical and she also developed hematoma in the left upper quadrant.  She is on oxycodone and she is going to follow with Dr. Mallory tomorrow.    Diarrhea.  She has diarrhea especially in the morning and then it subsides.  She can use Imodium as needed.  I would also like her to start taking Creon which she will take 2 capsules with meals and 1 capsule with snacks.    Anemia.  She had iron deficiency anemia and she received INFeD on 10/16/2020.  She felt much better after that.  I would like to repeat blood work tomorrow and see if she needs more INFeD.      Neuropathy.  This has improved after chemotherapy but she still has tingling and numbness of her feet.  We discussed that sometimes acupuncture or laser therapy can help.  She will consider that.    Port-A-Cath.  She was asking when it could be removed.  We discussed that it can be removed at any time but there is a chance that if there is cancer recurrence then she will need it again.  The chance of cancer recurrence is highest in the first couple of years.  Due to this reason we decided on keeping the Port-A-Cath in place right now and she will need it flushed every 8 weeks.    I will see her back in 2 months.    All questions answered and she is agreeable with the plan.    Magaly Pollard MD

## 2020-11-19 NOTE — PROGRESS NOTES
"Nathalie Bashir is a 55 year old female who is being evaluated via a billable video visit.      The patient has been notified of following:     \"This video visit will be conducted via a call between you and your physician/provider. We have found that certain health care needs can be provided without the need for an in-person physical exam.  This service lets us provide the care you need with a video conversation.  If a prescription is necessary we can send it directly to your pharmacy.  If lab work is needed we can place an order for that and you can then stop by our lab to have the test done at a later time.    Video visits are billed at different rates depending on your insurance coverage.  Please reach out to your insurance provider with any questions.    If during the course of the call the physician/provider feels a video visit is not appropriate, you will not be charged for this service.\"    Patient has given verbal consent for Video visit? Yes  How would you like to obtain your AVS? MyChart  If you are dropped from the video visit, the video invite should be resent to: Text to cell phone: [phone]  Will anyone else be joining your video visit? No       STEPHENIE Sanches        Video-Visit Details    Type of service:  Video Visit    Video Start Time: 10:06 AM  Video End Time: 10:23 AM    Originating Location (pt. Location): Home    Distant Location (provider location):  [facility]     Platform used for Video Visit: Padmini Pollard MD        "

## 2020-11-19 NOTE — PROGRESS NOTES
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  The permanent Whipple specimen came back with an incidental, T4, diffuse type signet ring gastric cancer with a microscopically positive margin.  She underwent subsequent neoadjuvant chemotherapy then subtotal gastrectomy with lymph node dissection and grant-en-y enteric reconstruction 10/21/2020.     Pertinent Oncologic History: 54 F w/ history of atypical abdominal symptoms and GI complaints including nausea and intermittent diarrhea.  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  While on surveillance, her most recent EUS showed increase in the size of the cyst described as dilation of the main pancreatic duct in the head of the pancreas to 10.4 mm.  MRI 9 mm, and CT 8 mm.  No other suspicious features.  FNA and fluid analysis showed mucinous epithelium, a string sign, high fluid amylase, and normal fluid CEA.   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  The permanent Whipple specimen came back with an incidental, T4, diffuse type signet ring gastric cancer with a microscopically positive margin.  She recovered from her Whipple, then completed a course of neoadjuvant taxotere, 5-FU, and oxaliplatin, with some difficulty (including 2 hospitalizations).  She was restaged without clear evidence of disease progression or obvious metastatic disease.  Diagnostic laparoscopy did not reveal occult metastatic disease.  As such, she underwent subtotal gastrectomy with lymph node dissection and grant-en-y enteric reconstruction 10/21/2020.   She did well but was re-admitted for a couple of days following discharge with PO intolerance and a fluid collection adjacent to the gastrojejunostomy.  CT and upper GI  "revealed no evidence of anastomotic leak.  A short course of bowel rest and empiric antibiotics allowed to resume a diet and she has been doing well since.  She now presents for post-op check.     Pertinent Exam: Abdomen soft, non-tender, and non-distended.  No jaundice or scleral icterus.  Her midline incision is healing well.     /76 (BP Location: Right arm, Patient Position: Sitting, Cuff Size: Adult Regular)   Pulse 89   Temp 98.5  F (36.9  C) (Tympanic)   Resp 16   Ht 1.727 m (5' 8\")   Wt 70.8 kg (156 lb 1.6 oz)   LMP 09/23/2014   SpO2 99%   BMI 23.73 kg/m       HISTORY OF PRESENT ILLNESS:  The patient is generally feeling well and is maintaining weight.  She has not been vomiting.  Her chest/back pain with radiation up to the shoulder originally improved, but is now getting worse again when she tries to eat and she cannot pinpoint any exact food that causes it.  She is still taking her BID PPI.     Pertinent Work-Up/Findings:     Labs: None today.     Surgical Pathology (10/21/2020):   SPECIMEN(S):   A: Partial omentum   B: Distal subtotal gastrectomy   C: Per-gastric fat     FINAL DIAGNOSIS:   OMENTUM, PARTIAL OMENTECTOMY:   - Benign omental fibroadipose tissue, negative for malignancy     B. STOMACH, DISTAL SUBTOTAL GASTRECTOMY:   - Focal residual adenocarcinoma (0.4cm) with signet-ring morphology status post-neoadjuvant chemotherapy.   - Resection margins negative for malignancy.   - Gastric mucosa with features of reactive gastropathy and focal ulceration.   - Duodenal mucosa with surface ulceration.   - Six benign lymph nodes, negative for malignancy (0/6).   - Serosal foreign body giant cell reaction compatible with prior surgical intervention.     C. TOSHIA-GASTRIC FAT, RESECTION:   - Three benign lymph nodes, negative for malignancy (0/3).     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  Final pathology " showed benign changes in the pancreas and also an incidental, T4, diffuse-type signet ring gastric cancer with a submucosal growth pattern.  She underwent subsequent neoadjuvant chemotherapy then subtotal gastrectomy, resection of prior gastrojejunostomy, lymph node dissection, and grant-en-y enteric reconstruction 10/21/2020.  She did well from this.  She did have a readmission for PO intolerance that initially resolved.  There was no evidence of anastomotic leak on that readmission.  She has not been having vomiting, but she continues to have discomfort with swallowing that radiates up her back/chest and into her shoulders.  She has finished her empiric antibiotics.  We discussed her pathology and the promising results.  She has completed all of chemotherapy and will likely not require further treatment unless recurrence occurs.  Thus, she should be able to go on surveillance at this time.  We will refer her back to medical oncology this purpose.  We discussed the importance of nutrition moving forward and have recommend she begin taking a daily multivitamin in addition to vitamin B complex.  In regards to her symptoms, we got a new CTAP today with final read pending.  It appears the previously seen fluid collection has resolved and things generally look good in the abdomen.  Her anastomoses are patent, bowel non-distended, and esophagus non-distended.  There is retained material within the stomach.  We will prescribe Carafate and have recommended she continue her BID PPI.  We will arrange for gastroenterology consultation and potential EGD to better assess for pathology within the stomach that may be leading to these symptoms.  I did discuss that this could all just be related to sub-par stomach emptying, eating slightly too much/too fast, and getting subsequent reflux with resultant esophageal spasm.  She may very well need pro-motility agents in the near future.  We will contact GI and will be in communication  with the patient.  She will call us if she has any problems.   All questions were answered and the patient was in agreement with and understanding of the plan.     Total time spent with the patient was 30 minutes, of which more than half was counseling and coordination of care.

## 2020-11-19 NOTE — PROGRESS NOTES
Oncology/Hematology Visit Note  Nov 19, 2020    Reason for Visit: Follow up of gastric adenocarcinoma.    History of Present Illness:     Please see previous notes for detail. I have copied and updated from prior notes.    Ms. Bashir is a 55 year old female with a history of gastric adenocarcinoma.  She was being followed for a pancreatic duct dilatation and pancreatic cyst which was noted in November 2018.  Since May 2019 she started noticing some nausea and vomiting and occasional abdominal discomfort.  She had an ultrasound in August 2019 which was essentially unremarkable.  She had a repeat endoscopic ultrasound on 10/29/2019 which showed increase in size of the cyst as well as dilation of the main pancreatic duct.  FNA and fluid analysis showed mucinous epithelium.  She was referred to Dr. 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  Surprisingly there was no pancreatic ductal neoplasm seen but on the resected specimen stomach cancer was noted.  It was poorly differentiated adenocarcinoma with poorly cohesive/signet ring cell type with proximal gastric mucosal and serosal margins being positive.  There was lymphovascular and perineural invasion seen.  22 lymph nodes were sampled and all were benign.  It was a pT4aN0 lesion.  HER-2/christine FISH was not amplified.         EUS on 3/3/2020 without clear evidence of neoplasm endoscopically. Left gastric lymph node fine-needle aspiration was negative for carcinoma. Biopsy from the gastric jejunal anastomosis as well as lesser curvature of the stomach also did not show any malignancy.     She had a PET/CT on 3/13/2020 which showed soft tissue streaky density with increased FDG uptake adjacent to the pancreaticojejunostomy which could be neoplastic in origin.  There is mild increased FDG uptake in the gastric remnant.  Focal area of soft tissue thickening with fatty streakiness along medial laparotomy with increased FDG uptake which  likely is inflammatory. Increased FDG uptake in thoracic esophagus which could be esophagitis.      She started on chemotherapy with 5FU, oxaliplatin, and Taxotere on 3/27/20. She was seen on 4/7/20 for evaluation of fevers. She was treated for possible pneumonia with Levaquin.      She was seen in clinic 4/13/20 for RUQ pain,CT and labs reassuring. She received cycles 2 and 3 of chemotherapy on 4/20/20 and 5/4/20. She was admitted from 5/21-5/23/20 with neutropenic aspiration pneumonia. She received cycle 4 on 5/27/20 with Neulasta support. PET/CT on 6/3/20 showed mild residual uptake at the site of gastrojejunal anastomosis which likely is physiologic.  There is almost resolution of soft tissue nodule next to the pancreaticoduodenal anastomosis without FDG uptake.  There is focal increased uptake in the left posterior acetabulum which could be artifact.     Decreased dose of oxaliplatin due to neuropathy/cold sensitivity on 6/11. Decreased 5FU to 85% with cycle #6 due to mucositis/diarrhea/taste changes. Treatment was held 7/9/20 due to diarrhea, dehydration, tachycardia, rash, and weight loss and she got cycle 7 on 7/15. On 7/20 she presented to the ED with RUQ abdominal pain, and was admitted 7/20-7/29 for acute cholangitis     She received C#8 FLOT on 8/14/2020.    PET/CT on 9/2/2020 did not show evidence of disease.  Small peritoneal nodule was a stable.  This was most consistent with fat necrosis.    CT chest abdomen and pelvis on 9/23/2020 also does not show evidence of malignancy recurrence and the peritoneal nodule in the right upper quadrant is stable.  Before definite surgery she had diagnostic laparoscopy which was negative for evidence of malignancy so on 10/21/2020 she had exploratory laparotomy with extensive lysis of additions, partial omentectomy, resection of Gastrojejunal Anastomosis w/ En Bloc Subtotal Gastrectomy and modified D2 Lymphadenectomy with David-en-Y Gastrojejunostomy Reconstruction.   Final pathology showed Focal residual poorly differentiated adenocarcinoma (0.4cm) with signet-ring morphology status    post-neoadjuvant chemotherapy and all 9 lymph nodes were benign.  All margins were negative.  It was a pT1bN0 lesion.    Before definite surgery she had diagnostic laparoscopy which was negative for evidence of malignancy so on 10/21/2020 she had exploratory laparotomy with extensive lysis of additions, partial omentectomy, resection of Gastrojejunal Anastomosis w/ En Bloc Subtotal Gastrectomy and modified D2 Lymphadenectomy with David-en-Y Gastrojejunostomy Reconstruction.  Final pathology showed Focal residual poorly differentiated adenocarcinoma (0.4cm) with signet-ring morphology status    post-neoadjuvant chemotherapy and all 9 lymph nodes were benign.  All margins were negative.  It was a pT1bN0 lesion.    She was admitted from 11/4/2020-11/6/2020 for nausea vomiting and left upper quadrant pain and CT scan showed fluid collection in the left upper quadrant, likely hematoma.  Upper GI was negative for leak.  No drainage was recommended by IR.  He was discharged on 7 days of levofloxacin and Flagyl.      Interval History:  -this is a video visit through [facility]. She still has pain in the left upper abdomen and taking oxycodone 3-4 times daily.  She is taking Zofran for nausea which helps.  She has some diarrhea which usually is in the morning and then it subsides.  She is not taking any pancreas enzymes.  Overall energy is slowly improving.  She denies any fevers or chills.  She has noticed improvement in the neuropathy in the hands but she still has tingling and numbness in her feet.  She does not believe that she has lost weight.    ECOG 2    ROS:  Rest of the comprehensive review of the system was essentially unremarkable.    I reviewed other history in epic as below.      Current Outpatient Medications   Medication Sig Dispense Refill     amitriptyline (ELAVIL) 25 MG tablet Take 75 mg by  mouth At Bedtime        citalopram (CELEXA) 20 MG tablet Take 20 mg by mouth every morning        cyclobenzaprine (FLEXERIL) 10 MG tablet Take 1 tablet (10 mg) by mouth every 8 hours as needed for muscle spasms 20 tablet 0     hydrOXYzine (ATARAX) 25 MG tablet Take 25 mg by mouth 3 times daily as needed for itching (allergies)       ibuprofen (ADVIL/MOTRIN) 600 MG tablet Take 1 tablet (600 mg) by mouth every 6 hours as needed for moderate pain 50 tablet 0     loperamide (IMODIUM) 2 MG capsule Take 4 mg by mouth 4 times daily as needed for diarrhea        loratadine-pseudoePHEDrine (CLARITIN-D 24-HOUR)  MG 24 hr tablet Take 1 tablet by mouth daily       ondansetron (ZOFRAN) 8 MG tablet Take 1 tablet (8 mg) by mouth every 8 hours as needed (Nausea/Vomiting) 90 tablet 3     oxyCODONE (ROXICODONE) 5 MG tablet Take 1-2 tablets (5-10 mg) by mouth every 6 hours as needed for moderate to severe pain 60 tablet 0     pantoprazole (PROTONIX) 40 MG EC tablet TAKE ONE TABLET BY MOUTH TWICE A DAY 60 tablet 1     polyethylene glycol (MIRALAX) 17 g packet Take 17 g by mouth daily as needed for constipation 14 packet 0     potassium chloride ER (KLOR-CON M) 20 MEQ CR tablet TAKE ONE TABLET BY MOUTH ONCE DAILY 30 tablet 1     simethicone 80 MG TABS Take 1 tablet by mouth every 6 hours as needed (bloating, gas, belching) 90 tablet 3     SUMAtriptan (IMITREX) 100 MG tablet Take 100 mg by mouth at onset of headache for migraine         Physical Examination:    LMP 09/23/2014   Wt Readings from Last 10 Encounters:   11/04/20 72.6 kg (160 lb)   10/25/20 74 kg (163 lb 3.2 oz)   10/16/20 75.6 kg (166 lb 9.6 oz)   10/13/20 74.6 kg (164 lb 7.4 oz)   09/25/20 78 kg (172 lb)   09/25/20 77.8 kg (171 lb 9.6 oz)   09/04/20 76.1 kg (167 lb 11.2 oz)   08/21/20 75.9 kg (167 lb 6.4 oz)   08/14/20 82.6 kg (182 lb)   08/06/20 87.2 kg (192 lb 3.2 oz)       Constitutional.  Looks well and in no apparent distress.   Eyes.  Without eye redness or  apparent jaundice.   Respiratory.  Non labored breathing. Speaking in full sentences.    Skin.  No concerning skin rashes on the skin visualized.   Neurological.  Is alert and oriented.  Psychiatric.  Mood and affect seem appropriate.      The rest of a comprehensive physical examination is deferred due to Public Health Emergency video visit restrictions.      Laboratory Data:     Reviewed      Assessment and Plan:    Gastric adenocarcinona, HER-2 negative.   - Started on neoadjuvant FLOT on 3/7/2020. She has required a dose reduction of her oxaliplatin by 30% due to cold sensitivity/neuropathy with cycle #5. 5FU was decreased to 85% with cycle #6 due to mucositis/diarrhea/taste changes.   Received C#8 on 8/14/2020.  PET/CT on 9/2/2020 did not show evidence of disease.  Small peritoneal nodule was a stable.  This was most consistent with fat necrosis.    CT chest abdomen and pelvis on 9/23/2020 also does not show evidence of malignancy recurrence and the peritoneal nodule in the right upper quadrant is stable.    Before definite surgery she had diagnostic laparoscopy which was negative for evidence of malignancy so on 10/21/2020 she had exploratory laparotomy with extensive lysis of additions, partial omentectomy, resection of Gastrojejunal Anastomosis w/ En Bloc Subtotal Gastrectomy and modified D2 Lymphadenectomy with David-en-Y Gastrojejunostomy Reconstruction.  Final pathology showed Focal residual poorly differentiated adenocarcinoma (0.4cm) with signet-ring morphology status    post-neoadjuvant chemotherapy and all 9 lymph nodes were benign.  All margins were negative.  It was a pT1bN0 lesion.    Before definite surgery she had diagnostic laparoscopy which was negative for evidence of malignancy so on 10/21/2020 she had exploratory laparotomy with extensive lysis of additions, partial omentectomy, resection of Gastrojejunal Anastomosis w/ En Bloc Subtotal Gastrectomy and modified D2 Lymphadenectomy with  David-en-Y Gastrojejunostomy Reconstruction.  Final pathology showed Focal residual poorly differentiated adenocarcinoma (0.4cm) with signet-ring morphology status    post-neoadjuvant chemotherapy and all 9 lymph nodes were benign.  All margins were negative.  It was a pT1bN0 lesion.    We discussed the situation in detail.  She is slowly recovering now.  She has completed 6 months of chemotherapy in the neoadjuvant setting and now has undergone definitive surgery.  We will keep her on surveillance which would be CT chest abdomen and pelvis every 6 months and EGD as needed.    Abdominal pain.  Postsurgical and she also developed hematoma in the left upper quadrant.  She is on oxycodone and she is going to follow with Dr. Mallory tomorrow.    Diarrhea.  She has diarrhea especially in the morning and then it subsides.  She can use Imodium as needed.  I would also like her to start taking Creon which she will take 2 capsules with meals and 1 capsule with snacks.    Anemia.  She had iron deficiency anemia and she received INFeD on 10/16/2020.  She felt much better after that.  I would like to repeat blood work tomorrow and see if she needs more INFeD.      Neuropathy.  This has improved after chemotherapy but she still has tingling and numbness of her feet.  We discussed that sometimes acupuncture or laser therapy can help.  She will consider that.    Port-A-Cath.  She was asking when it could be removed.  We discussed that it can be removed at any time but there is a chance that if there is cancer recurrence then she will need it again.  The chance of cancer recurrence is highest in the first couple of years.  Due to this reason we decided on keeping the Port-A-Cath in place right now and she will need it flushed every 8 weeks.    I will see her back in 2 months.    All questions answered and she is agreeable with the plan.    Magaly Pollard MD

## 2020-11-19 NOTE — PATIENT INSTRUCTIONS
Start Creon 2 capsules with meals and 1 capsule with snacks    Labs tomorrow    Port flushes every 8 weeks  See me back in 2 months    Follow with Dr Mallory

## 2020-11-20 ENCOUNTER — OFFICE VISIT (OUTPATIENT)
Dept: SURGERY | Facility: CLINIC | Age: 55
End: 2020-11-20
Attending: SURGERY
Payer: MEDICARE

## 2020-11-20 ENCOUNTER — ANCILLARY PROCEDURE (OUTPATIENT)
Dept: CT IMAGING | Facility: CLINIC | Age: 55
End: 2020-11-20
Attending: SURGERY
Payer: MEDICARE

## 2020-11-20 ENCOUNTER — APPOINTMENT (OUTPATIENT)
Dept: LAB | Facility: CLINIC | Age: 55
End: 2020-11-20
Attending: INTERNAL MEDICINE
Payer: MEDICARE

## 2020-11-20 VITALS
DIASTOLIC BLOOD PRESSURE: 76 MMHG | TEMPERATURE: 98.5 F | BODY MASS INDEX: 23.66 KG/M2 | OXYGEN SATURATION: 99 % | SYSTOLIC BLOOD PRESSURE: 120 MMHG | HEIGHT: 68 IN | RESPIRATION RATE: 16 BRPM | HEART RATE: 89 BPM | WEIGHT: 156.1 LBS

## 2020-11-20 DIAGNOSIS — D64.9 ANEMIA, UNSPECIFIED TYPE: ICD-10-CM

## 2020-11-20 DIAGNOSIS — C16.2 MALIGNANT NEOPLASM OF BODY OF STOMACH (H): ICD-10-CM

## 2020-11-20 DIAGNOSIS — C16.2 MALIGNANT NEOPLASM OF BODY OF STOMACH (H): Primary | ICD-10-CM

## 2020-11-20 DIAGNOSIS — C16.8 MALIGNANT NEOPLASM OF OVERLAPPING SITES OF STOMACH (H): ICD-10-CM

## 2020-11-20 LAB
ALBUMIN SERPL-MCNC: 2.7 G/DL (ref 3.4–5)
ALP SERPL-CCNC: 133 U/L (ref 40–150)
ALT SERPL W P-5'-P-CCNC: 16 U/L (ref 0–50)
ANION GAP SERPL CALCULATED.3IONS-SCNC: 6 MMOL/L (ref 3–14)
AST SERPL W P-5'-P-CCNC: 29 U/L (ref 0–45)
BASOPHILS # BLD AUTO: 0.1 10E9/L (ref 0–0.2)
BASOPHILS NFR BLD AUTO: 0.9 %
BILIRUB SERPL-MCNC: 0.2 MG/DL (ref 0.2–1.3)
BUN SERPL-MCNC: 5 MG/DL (ref 7–30)
CALCIUM SERPL-MCNC: 8.5 MG/DL (ref 8.5–10.1)
CHLORIDE SERPL-SCNC: 109 MMOL/L (ref 94–109)
CO2 SERPL-SCNC: 26 MMOL/L (ref 20–32)
CREAT SERPL-MCNC: 0.71 MG/DL (ref 0.52–1.04)
DIFFERENTIAL METHOD BLD: ABNORMAL
EOSINOPHIL # BLD AUTO: 0.1 10E9/L (ref 0–0.7)
EOSINOPHIL NFR BLD AUTO: 2.4 %
ERYTHROCYTE [DISTWIDTH] IN BLOOD BY AUTOMATED COUNT: 19.9 % (ref 10–15)
FERRITIN SERPL-MCNC: 185 NG/ML (ref 8–252)
GFR SERPL CREATININE-BSD FRML MDRD: >90 ML/MIN/{1.73_M2}
GLUCOSE SERPL-MCNC: 101 MG/DL (ref 70–99)
HCT VFR BLD AUTO: 32.2 % (ref 35–47)
HGB BLD-MCNC: 9.8 G/DL (ref 11.7–15.7)
IMM GRANULOCYTES # BLD: 0 10E9/L (ref 0–0.4)
IMM GRANULOCYTES NFR BLD: 0.3 %
IRON SATN MFR SERPL: 24 % (ref 15–46)
IRON SERPL-MCNC: 54 UG/DL (ref 35–180)
LYMPHOCYTES # BLD AUTO: 1.7 10E9/L (ref 0.8–5.3)
LYMPHOCYTES NFR BLD AUTO: 29.1 %
MCH RBC QN AUTO: 27.9 PG (ref 26.5–33)
MCHC RBC AUTO-ENTMCNC: 30.4 G/DL (ref 31.5–36.5)
MCV RBC AUTO: 92 FL (ref 78–100)
MONOCYTES # BLD AUTO: 0.4 10E9/L (ref 0–1.3)
MONOCYTES NFR BLD AUTO: 6.4 %
NEUTROPHILS # BLD AUTO: 3.5 10E9/L (ref 1.6–8.3)
NEUTROPHILS NFR BLD AUTO: 60.9 %
NRBC # BLD AUTO: 0 10*3/UL
NRBC BLD AUTO-RTO: 0 /100
PLATELET # BLD AUTO: 243 10E9/L (ref 150–450)
POTASSIUM SERPL-SCNC: 3.5 MMOL/L (ref 3.4–5.3)
PROT SERPL-MCNC: 6.5 G/DL (ref 6.8–8.8)
RBC # BLD AUTO: 3.51 10E12/L (ref 3.8–5.2)
RETICS # AUTO: 77.2 10E9/L (ref 25–95)
RETICS/RBC NFR AUTO: 2.2 % (ref 0.5–2)
SODIUM SERPL-SCNC: 141 MMOL/L (ref 133–144)
TIBC SERPL-MCNC: 228 UG/DL (ref 240–430)
WBC # BLD AUTO: 5.8 10E9/L (ref 4–11)

## 2020-11-20 PROCEDURE — G0463 HOSPITAL OUTPT CLINIC VISIT: HCPCS

## 2020-11-20 PROCEDURE — 85045 AUTOMATED RETICULOCYTE COUNT: CPT | Performed by: INTERNAL MEDICINE

## 2020-11-20 PROCEDURE — 99024 POSTOP FOLLOW-UP VISIT: CPT | Performed by: SURGERY

## 2020-11-20 PROCEDURE — 82728 ASSAY OF FERRITIN: CPT | Performed by: INTERNAL MEDICINE

## 2020-11-20 PROCEDURE — 83550 IRON BINDING TEST: CPT | Performed by: INTERNAL MEDICINE

## 2020-11-20 PROCEDURE — 80053 COMPREHEN METABOLIC PANEL: CPT | Performed by: INTERNAL MEDICINE

## 2020-11-20 PROCEDURE — 36591 DRAW BLOOD OFF VENOUS DEVICE: CPT

## 2020-11-20 PROCEDURE — 74177 CT ABD & PELVIS W/CONTRAST: CPT | Performed by: RADIOLOGY

## 2020-11-20 PROCEDURE — 83540 ASSAY OF IRON: CPT | Performed by: INTERNAL MEDICINE

## 2020-11-20 PROCEDURE — 250N000011 HC RX IP 250 OP 636: Performed by: SURGERY

## 2020-11-20 PROCEDURE — 85025 COMPLETE CBC W/AUTO DIFF WBC: CPT | Performed by: INTERNAL MEDICINE

## 2020-11-20 RX ORDER — HEPARIN SODIUM (PORCINE) LOCK FLUSH IV SOLN 100 UNIT/ML 100 UNIT/ML
500 SOLUTION INTRAVENOUS ONCE
Status: COMPLETED | OUTPATIENT
Start: 2020-11-20 | End: 2020-11-20

## 2020-11-20 RX ORDER — HEPARIN SODIUM (PORCINE) LOCK FLUSH IV SOLN 100 UNIT/ML 100 UNIT/ML
5 SOLUTION INTRAVENOUS
Status: COMPLETED | OUTPATIENT
Start: 2020-11-20 | End: 2020-11-20

## 2020-11-20 RX ORDER — IOPAMIDOL 755 MG/ML
96 INJECTION, SOLUTION INTRAVASCULAR ONCE
Status: COMPLETED | OUTPATIENT
Start: 2020-11-20 | End: 2020-11-20

## 2020-11-20 RX ADMIN — IOPAMIDOL 96 ML: 755 INJECTION, SOLUTION INTRAVASCULAR at 13:03

## 2020-11-20 RX ADMIN — HEPARIN SODIUM (PORCINE) LOCK FLUSH IV SOLN 100 UNIT/ML 500 UNITS: 100 SOLUTION at 13:25

## 2020-11-20 RX ADMIN — Medication 5 ML: at 11:15

## 2020-11-20 ASSESSMENT — MIFFLIN-ST. JEOR: SCORE: 1356.56

## 2020-11-20 ASSESSMENT — PAIN SCALES - GENERAL: PAINLEVEL: MODERATE PAIN (5)

## 2020-11-20 NOTE — LETTER
11/20/2020         RE: Nathalie Bashir  [address] [place] [address]        Dear Colleague,    Thank you for referring your patient, Nathalie Bashir, to the [facility]. Please see a copy of my visit note below.    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  The permanent Whipple specimen came back with an incidental, T4, diffuse type signet ring gastric cancer with a microscopically positive margin.  She underwent subsequent neoadjuvant chemotherapy then subtotal gastrectomy with lymph node dissection and grant-en-y enteric reconstruction 10/21/2020.     Pertinent Oncologic History: 54 F w/ history of atypical abdominal symptoms and GI complaints including nausea and intermittent diarrhea.  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  While on surveillance, her most recent EUS showed increase in the size of the cyst described as dilation of the main pancreatic duct in the head of the pancreas to 10.4 mm.  MRI 9 mm, and CT 8 mm.  No other suspicious features.  FNA and fluid analysis showed mucinous epithelium, a string sign, high fluid amylase, and normal fluid CEA.   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  The permanent Whipple specimen came back with an incidental, T4, diffuse type signet ring gastric cancer with a microscopically positive margin.  She recovered from her Whipple, then completed a course of neoadjuvant taxotere, 5-FU, and oxaliplatin, with some difficulty (including 2 hospitalizations).  She was restaged without clear evidence of disease progression or obvious metastatic disease.  Diagnostic laparoscopy did not reveal occult metastatic disease.  As such, she underwent subtotal  "gastrectomy with lymph node dissection and grant-en-y enteric reconstruction 10/21/2020.   She did well but was re-admitted for a couple of days following discharge with PO intolerance and a fluid collection adjacent to the gastrojejunostomy.  CT and upper GI revealed no evidence of anastomotic leak.  A short course of bowel rest and empiric antibiotics allowed to resume a diet and she has been doing well since.  She now presents for post-op check.     Pertinent Exam: Abdomen soft, non-tender, and non-distended.  No jaundice or scleral icterus.  Her midline incision is healing well.     /76 (BP Location: Right arm, Patient Position: Sitting, Cuff Size: Adult Regular)   Pulse 89   Temp 98.5  F (36.9  C) (Tympanic)   Resp 16   Ht 1.727 m (5' 8\")   Wt 70.8 kg (156 lb 1.6 oz)   LMP 09/23/2014   SpO2 99%   BMI 23.73 kg/m       HISTORY OF PRESENT ILLNESS:  The patient is generally feeling well and is maintaining weight.  She has not been vomiting.  Her chest/back pain with radiation up to the shoulder originally improved, but is now getting worse again when she tries to eat and she cannot pinpoint any exact food that causes it.  She is still taking her BID PPI.     Pertinent Work-Up/Findings:     Labs: None today.     Surgical Pathology (10/21/2020):   SPECIMEN(S):   A: Partial omentum   B: Distal subtotal gastrectomy   C: Per-gastric fat     FINAL DIAGNOSIS:   OMENTUM, PARTIAL OMENTECTOMY:   - Benign omental fibroadipose tissue, negative for malignancy     B. STOMACH, DISTAL SUBTOTAL GASTRECTOMY:   - Focal residual adenocarcinoma (0.4cm) with signet-ring morphology status post-neoadjuvant chemotherapy.   - Resection margins negative for malignancy.   - Gastric mucosa with features of reactive gastropathy and focal ulceration.   - Duodenal mucosa with surface ulceration.   - Six benign lymph nodes, negative for malignancy (0/6).   - Serosal foreign body giant cell reaction compatible with prior surgical " intervention.     C. TOSHIA-GASTRIC FAT, RESECTION:   - Three benign lymph nodes, negative for malignancy (0/3).     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  Final pathology showed benign changes in the pancreas and also an incidental, T4, diffuse-type signet ring gastric cancer with a submucosal growth pattern.  She underwent subsequent neoadjuvant chemotherapy then subtotal gastrectomy, resection of prior gastrojejunostomy, lymph node dissection, and grant-en-y enteric reconstruction 10/21/2020.  She did well from this.  She did have a readmission for PO intolerance that initially resolved.  There was no evidence of anastomotic leak on that readmission.  She has not been having vomiting, but she continues to have discomfort with swallowing that radiates up her back/chest and into her shoulders.  She has finished her empiric antibiotics.  We discussed her pathology and the promising results.  She has completed all of chemotherapy and will likely not require further treatment unless recurrence occurs.  Thus, she should be able to go on surveillance at this time.  We will refer her back to medical oncology this purpose.  We discussed the importance of nutrition moving forward and have recommend she begin taking a daily multivitamin in addition to vitamin B complex.  In regards to her symptoms, we got a new CTAP today with final read pending.  It appears the previously seen fluid collection has resolved and things generally look good in the abdomen.  Her anastomoses are patent, bowel non-distended, and esophagus non-distended.  There is retained material within the stomach.  We will prescribe Carafate and have recommended she continue her BID PPI.  We will arrange for gastroenterology consultation and potential EGD to better assess for pathology within the stomach that may be leading to these symptoms.  I did discuss that this could  all just be related to sub-par stomach emptying, eating slightly too much/too fast, and getting subsequent reflux with resultant esophageal spasm.  She may very well need pro-motility agents in the near future.  We will contact GI and will be in communication with the patient.  She will call us if she has any problems.   All questions were answered and the patient was in agreement with and understanding of the plan.     Total time spent with the patient was 30 minutes, of which more than half was counseling and coordination of care.      Again, thank you for allowing me to participate in the care of your patient.        Sincerely,        Yandel Mallory MD

## 2020-11-20 NOTE — NURSING NOTE
"Oncology Rooming Note    November 20, 2020 11:34 AM   Nathalie Bashir is a 55 year old female who presents for:    Chief Complaint   Patient presents with     Port Draw     labs drawn from port by rn.  vs taken     Oncology Clinic Visit     2-3wk post-op; Malignant neoplasm of overlapping sites of stomach      Initial Vitals: /76 (BP Location: Right arm, Patient Position: Sitting, Cuff Size: Adult Regular)   Pulse 89   Temp 98.5  F (36.9  C) (Tympanic)   Resp 16   Ht 1.727 m (5' 8\")   Wt 70.8 kg (156 lb 1.6 oz)   LMP 09/23/2014   SpO2 99%   BMI 23.73 kg/m   Estimated body mass index is 23.73 kg/m  as calculated from the following:    Height as of this encounter: 1.727 m (5' 8\").    Weight as of this encounter: 70.8 kg (156 lb 1.6 oz). Body surface area is 1.84 meters squared.  Moderate Pain (5) Comment: Data Unavailable   Patient's last menstrual period was 09/23/2014.  Allergies reviewed: Yes  Medications reviewed: Yes    Medications: MEDICATION REFILLS NEEDED TODAY. Provider was NOT notified. Patient is requesting refill on Flexeril  Pharmacy name entered into [facility]:    [facility] [place], [place] - [address] [place] [address] [phone]  [facility] [id number] [place], [place] - [address]    Clinical concerns: No new concerns today.       Lily Mason MA            "

## 2020-11-20 NOTE — NURSING NOTE
"Chief Complaint   Patient presents with     Port Draw     labs drawn from port by rn.  vs taken     Port accessed with 20 gauge 3/4\" gripper needle and labs drawn by rn.  Port flushed with NS and heparin then de-accessed.  Pt tolerated well.  VS taken.  Pt checked in for next appt.    Jo Disla RN      "

## 2020-11-23 ENCOUNTER — PATIENT OUTREACH (OUTPATIENT)
Dept: GASTROENTEROLOGY | Facility: CLINIC | Age: 55
End: 2020-11-23

## 2020-11-23 ENCOUNTER — PATIENT OUTREACH (OUTPATIENT)
Dept: PALLIATIVE CARE | Facility: CLINIC | Age: 55
End: 2020-11-23

## 2020-11-23 ENCOUNTER — PREP FOR PROCEDURE (OUTPATIENT)
Dept: GASTROENTEROLOGY | Facility: CLINIC | Age: 55
End: 2020-11-23

## 2020-11-23 DIAGNOSIS — G89.18 POSTOPERATIVE PAIN: ICD-10-CM

## 2020-11-23 DIAGNOSIS — R13.10 DYSPHAGIA: Primary | ICD-10-CM

## 2020-11-23 NOTE — TELEPHONE ENCOUNTER
Per Leonora Aviles and Mohamud:    Visit/EGD and see where we get.  Depending on what we find, we may have to do further studies and/or refer to the esophageal dysmotility group    Please assist in scheduling:     Procedure/Imaging/Clinic: EGD  Physician: Dr. Aviles  Timin2020  Procedure length:60 minutes  Anesthesia:MAC  Dx: dysphagia/vomiting  Tier:2  Location: [facility]     Called to discuss with patient. Explained they can expect a call for date and time for procedure, will need a , someone to stay with them for 24 hours and should stay in town for 24 hours (within 45 min of Hospital) post procedure    Patient needs to get pre-op physical completed and will fax a copy to us along with bringing a hard copy with them. Fax number given. [phone] *If you do not get a preop physical, your procedure could be cancelled, patient voiced understanding*    Preop Plan:will have PCP do    Med Review    Blood thinner -  no  ASA - no  Diabetic - no    COVID test discussed:knows to get 4 days prior    Patient Education r/t procedure:done      A pre-op nurse will call 1-2 days prior to the procedure. Is advised to be NPO/no solid food 8 hours before the procedure. Ok to drink clear liquids (Water, Apple Juice or Gatorade) up to 2 hours prior to procedure.     Other specific details/comments:none     Verbalized understanding of all instructions. All questions answered.

## 2020-11-24 RX ORDER — OXYCODONE HYDROCHLORIDE 5 MG/1
5-10 TABLET ORAL EVERY 6 HOURS PRN
Qty: 60 TABLET | Refills: 0 | Status: SHIPPED | OUTPATIENT
Start: 2020-11-25 | End: 2020-12-08

## 2020-11-24 NOTE — PROGRESS NOTES
Copied from recent note:  She is seen today for guidance on how to taper her medications in the postoperative period.  She is currently taking oxycodone 10 mg three times per day.  Recommended decreasing to 10 mg BID x1 week, then returning to 5 mg BID PRN. She has primarily been taking the oxycodone longer term for neuropathic pain. Follow-up appointment is scheduled for 1/7/2021, or sooner if additional concerns arise.          Controlled Medication Requested:  Oxycodone 5mg   Last date dispensed/ sold  per [facility] website: 11.12.20 for 60 tabs  Last office visit: 11.12.20  Next office visit:  1.7.20  NOTES: Nathalie is almost back to her baseline usage of 2 tabs a day.  She still is having the sensation of food being stuck in her stomach    Recent Documentation from [place] prescription monitoring website:

## 2020-11-25 ENCOUNTER — PATIENT OUTREACH (OUTPATIENT)
Dept: SURGERY | Facility: CLINIC | Age: 55
End: 2020-11-25

## 2020-11-25 ENCOUNTER — TRANSFERRED RECORDS (OUTPATIENT)
Dept: HEALTH INFORMATION MANAGEMENT | Facility: CLINIC | Age: 55
End: 2020-11-25

## 2020-11-25 DIAGNOSIS — C16.2 MALIGNANT NEOPLASM OF BODY OF STOMACH (H): Primary | ICD-10-CM

## 2020-11-25 RX ORDER — SUCRALFATE 1 G/1
1 TABLET ORAL 4 TIMES DAILY PRN
Qty: 360 TABLET | Refills: 6 | Status: SHIPPED | OUTPATIENT
Start: 2020-11-25 | End: 2022-05-03

## 2020-11-25 NOTE — TELEPHONE ENCOUNTER
Surgical Oncology RN Care Coordination Note:     Called and spoke with patient about sending Rx for Carafate to help with ongoing symptoms she is reporting. Also discussed oral intake and reported weight loss. Confirmed she feels she is eating but that things go right through her. Informed her that we would like her to do at least 1 nutrition shake a day and continue additional oral intake. Informed her that I would plan to touch base with her on Friday to see how thing are going after starting medication and doing nutrition shakes at least daily.     Kenzie Ace, RN, BSN  Care Coordinator

## 2020-11-27 ENCOUNTER — PATIENT OUTREACH (OUTPATIENT)
Dept: SURGERY | Facility: CLINIC | Age: 55
End: 2020-11-27

## 2020-11-27 DIAGNOSIS — C16.9 GASTRIC ADENOCARCINOMA (H): ICD-10-CM

## 2020-11-27 RX ORDER — ONDANSETRON 8 MG/1
8 TABLET, FILM COATED ORAL EVERY 8 HOURS PRN
Qty: 90 TABLET | Refills: 2 | Status: SHIPPED | OUTPATIENT
Start: 2020-11-27 | End: 2022-04-14

## 2020-11-27 NOTE — TELEPHONE ENCOUNTER
Surgical Oncology RN Care Coordination Note:     Called to check in with patient to see how she is doing since last phone call. Overall she is doing well and states that the Carafate is helping with the symptoms she was having. She is doing the nutrition shake daily as well. She reports she is still having some nausea and would like zofran rx to be sent to local pharmacy as her refills are currently at the clinic pharmacy. Informed her I will confirm orders and that should not be a problem.    Will plan to continue to monitor symptoms/weight loss and proceed with EGD as scheduled. She will call if anything more arises prior to upcoming procedure.     Kenzie Ace, RN, BSN  Care Coordinator

## 2020-11-30 DIAGNOSIS — Z11.59 ENCOUNTER FOR SCREENING FOR OTHER VIRAL DISEASES: Primary | ICD-10-CM

## 2020-12-03 DIAGNOSIS — Z11.59 ENCOUNTER FOR SCREENING FOR OTHER VIRAL DISEASES: ICD-10-CM

## 2020-12-03 LAB
SARS-COV-2 RNA SPEC QL NAA+PROBE: NORMAL
SPECIMEN SOURCE: NORMAL

## 2020-12-03 PROCEDURE — U0003 INFECTIOUS AGENT DETECTION BY NUCLEIC ACID (DNA OR RNA); SEVERE ACUTE RESPIRATORY SYNDROME CORONAVIRUS 2 (SARS-COV-2) (CORONAVIRUS DISEASE [COVID-19]), AMPLIFIED PROBE TECHNIQUE, MAKING USE OF HIGH THROUGHPUT TECHNOLOGIES AS DESCRIBED BY CMS-2020-01-R: HCPCS | Performed by: INTERNAL MEDICINE

## 2020-12-04 LAB
LABORATORY COMMENT REPORT: NORMAL
SARS-COV-2 RNA SPEC QL NAA+PROBE: NEGATIVE
SPECIMEN SOURCE: NORMAL

## 2020-12-04 RX ORDER — ONDANSETRON 8 MG/1
8 TABLET, ORALLY DISINTEGRATING ORAL EVERY 8 HOURS PRN
Status: ON HOLD | COMMUNITY
End: 2020-12-07

## 2020-12-04 RX ORDER — LORAZEPAM 0.5 MG/1
0.5 TABLET ORAL DAILY PRN
Status: ON HOLD | COMMUNITY
End: 2020-12-07

## 2020-12-04 RX ORDER — OMEPRAZOLE 20 MG/1
20 TABLET, DELAYED RELEASE ORAL DAILY
Status: ON HOLD | COMMUNITY
End: 2020-12-07

## 2020-12-04 RX ORDER — FAMOTIDINE 20 MG/1
20 TABLET, FILM COATED ORAL 2 TIMES DAILY PRN
COMMUNITY
End: 2022-05-31

## 2020-12-04 RX ORDER — HYDROCORTISONE ACETATE 25 MG/1
25 SUPPOSITORY RECTAL 2 TIMES DAILY
Status: ON HOLD | COMMUNITY
End: 2020-12-07

## 2020-12-04 RX ORDER — MULTIVITAMIN WITH IRON
1 TABLET ORAL DAILY
Status: ON HOLD | COMMUNITY
End: 2022-08-23

## 2020-12-04 RX ORDER — TRETINOIN 0.25 MG/G
CREAM TOPICAL AT BEDTIME
Status: ON HOLD | COMMUNITY
End: 2020-12-07

## 2020-12-04 RX ORDER — MELOXICAM 15 MG/1
15 TABLET ORAL DAILY
Status: ON HOLD | COMMUNITY
End: 2020-12-07

## 2020-12-04 RX ORDER — LIDOCAINE 5 G/100G
1 CREAM RECTAL; TOPICAL 4 TIMES DAILY PRN
Status: ON HOLD | COMMUNITY
End: 2020-12-07

## 2020-12-04 RX ORDER — METOCLOPRAMIDE 5 MG/1
5 TABLET ORAL 2 TIMES DAILY PRN
COMMUNITY
End: 2022-04-14

## 2020-12-04 RX ORDER — DEXAMETHASONE 4 MG/1
4 TABLET ORAL DAILY PRN
Status: ON HOLD | COMMUNITY
End: 2020-12-07

## 2020-12-07 ENCOUNTER — APPOINTMENT (OUTPATIENT)
Dept: GENERAL RADIOLOGY | Facility: CLINIC | Age: 55
End: 2020-12-07
Attending: INTERNAL MEDICINE
Payer: MEDICARE

## 2020-12-07 ENCOUNTER — HOSPITAL ENCOUNTER (OUTPATIENT)
Facility: CLINIC | Age: 55
Discharge: HOME | End: 2020-12-07
Attending: INTERNAL MEDICINE | Admitting: INTERNAL MEDICINE
Payer: MEDICARE

## 2020-12-07 ENCOUNTER — ANESTHESIA (OUTPATIENT)
Dept: SURGERY | Facility: CLINIC | Age: 55
End: 2020-12-07
Payer: MEDICARE

## 2020-12-07 ENCOUNTER — ANESTHESIA EVENT (OUTPATIENT)
Dept: SURGERY | Facility: CLINIC | Age: 55
End: 2020-12-07
Payer: MEDICARE

## 2020-12-07 VITALS
HEART RATE: 82 BPM | DIASTOLIC BLOOD PRESSURE: 80 MMHG | WEIGHT: 142.5 LBS | HEIGHT: 68 IN | OXYGEN SATURATION: 99 % | SYSTOLIC BLOOD PRESSURE: 121 MMHG | BODY MASS INDEX: 21.6 KG/M2 | TEMPERATURE: 97 F | RESPIRATION RATE: 12 BRPM

## 2020-12-07 DIAGNOSIS — R13.10 DYSPHAGIA: ICD-10-CM

## 2020-12-07 LAB
ALBUMIN SERPL-MCNC: 3.3 G/DL (ref 3.4–5)
ALP SERPL-CCNC: 244 U/L (ref 40–150)
ALT SERPL W P-5'-P-CCNC: 42 U/L (ref 0–50)
AMYLASE SERPL-CCNC: 28 U/L (ref 30–110)
ANION GAP SERPL CALCULATED.3IONS-SCNC: 6 MMOL/L (ref 3–14)
AST SERPL W P-5'-P-CCNC: 53 U/L (ref 0–45)
BILIRUB SERPL-MCNC: 0.3 MG/DL (ref 0.2–1.3)
BUN SERPL-MCNC: 10 MG/DL (ref 7–30)
CALCIUM SERPL-MCNC: 8.7 MG/DL (ref 8.5–10.1)
CHLORIDE SERPL-SCNC: 107 MMOL/L (ref 94–109)
CO2 SERPL-SCNC: 25 MMOL/L (ref 20–32)
CREAT SERPL-MCNC: 0.73 MG/DL (ref 0.52–1.04)
ERYTHROCYTE [DISTWIDTH] IN BLOOD BY AUTOMATED COUNT: 18.1 % (ref 10–15)
GFR SERPL CREATININE-BSD FRML MDRD: >90 ML/MIN/{1.73_M2}
GLUCOSE SERPL-MCNC: 97 MG/DL (ref 70–99)
HCT VFR BLD AUTO: 41.9 % (ref 35–47)
HGB BLD-MCNC: 13.1 G/DL (ref 11.7–15.7)
INR PPP: 1.21 (ref 0.86–1.14)
LIPASE SERPL-CCNC: 41 U/L (ref 73–393)
MCH RBC QN AUTO: 28.3 PG (ref 26.5–33)
MCHC RBC AUTO-ENTMCNC: 31.3 G/DL (ref 31.5–36.5)
MCV RBC AUTO: 91 FL (ref 78–100)
PLATELET # BLD AUTO: 211 10E9/L (ref 150–450)
POTASSIUM SERPL-SCNC: 3.8 MMOL/L (ref 3.4–5.3)
PROT SERPL-MCNC: 7.6 G/DL (ref 6.8–8.8)
RBC # BLD AUTO: 4.63 10E12/L (ref 3.8–5.2)
SODIUM SERPL-SCNC: 138 MMOL/L (ref 133–144)
UPPER GI ENDOSCOPY: NORMAL
WBC # BLD AUTO: 7.9 10E9/L (ref 4–11)

## 2020-12-07 PROCEDURE — 74330 X-RAY BILE/PANC ENDOSCOPY: CPT | Mod: TC

## 2020-12-07 PROCEDURE — 85610 PROTHROMBIN TIME: CPT | Performed by: STUDENT IN AN ORGANIZED HEALTH CARE EDUCATION/TRAINING PROGRAM

## 2020-12-07 PROCEDURE — 370N000002 HC ANESTHESIA TECHNICAL FEE, EACH ADDTL 15 MIN: Performed by: INTERNAL MEDICINE

## 2020-12-07 PROCEDURE — 83690 ASSAY OF LIPASE: CPT | Performed by: STUDENT IN AN ORGANIZED HEALTH CARE EDUCATION/TRAINING PROGRAM

## 2020-12-07 PROCEDURE — 250N000011 HC RX IP 250 OP 636: Performed by: INTERNAL MEDICINE

## 2020-12-07 PROCEDURE — 82150 ASSAY OF AMYLASE: CPT | Performed by: STUDENT IN AN ORGANIZED HEALTH CARE EDUCATION/TRAINING PROGRAM

## 2020-12-07 PROCEDURE — 85027 COMPLETE CBC AUTOMATED: CPT | Performed by: STUDENT IN AN ORGANIZED HEALTH CARE EDUCATION/TRAINING PROGRAM

## 2020-12-07 PROCEDURE — 250N000009 HC RX 250: Performed by: ANESTHESIOLOGY

## 2020-12-07 PROCEDURE — 761N000007 HC RECOVERY PHASE 2 EACH 15 MINS: Performed by: INTERNAL MEDICINE

## 2020-12-07 PROCEDURE — 250N000009 HC RX 250: Performed by: NURSE ANESTHETIST, CERTIFIED REGISTERED

## 2020-12-07 PROCEDURE — 36415 COLL VENOUS BLD VENIPUNCTURE: CPT | Performed by: STUDENT IN AN ORGANIZED HEALTH CARE EDUCATION/TRAINING PROGRAM

## 2020-12-07 PROCEDURE — 370N000001 HC ANESTHESIA TECHNICAL FEE, 1ST 30 MIN: Performed by: INTERNAL MEDICINE

## 2020-12-07 PROCEDURE — 80053 COMPREHEN METABOLIC PANEL: CPT | Performed by: STUDENT IN AN ORGANIZED HEALTH CARE EDUCATION/TRAINING PROGRAM

## 2020-12-07 PROCEDURE — 360N000014 HC SURGERY LEVEL 2 1ST 30 MIN: Performed by: INTERNAL MEDICINE

## 2020-12-07 PROCEDURE — 999N000139 HC STATISTIC PRE-PROCEDURE ASSESSMENT II: Performed by: INTERNAL MEDICINE

## 2020-12-07 PROCEDURE — 43266 EGD ENDOSCOPIC STENT PLACE: CPT | Performed by: INTERNAL MEDICINE

## 2020-12-07 PROCEDURE — 250N000011 HC RX IP 250 OP 636: Performed by: NURSE ANESTHETIST, CERTIFIED REGISTERED

## 2020-12-07 PROCEDURE — C1876 STENT, NON-COA/NON-COV W/DEL: HCPCS | Performed by: INTERNAL MEDICINE

## 2020-12-07 PROCEDURE — 74360 X-RAY GUIDE GI DILATION: CPT | Mod: 26 | Performed by: INTERNAL MEDICINE

## 2020-12-07 PROCEDURE — 258N000003 HC RX IP 258 OP 636: Performed by: NURSE ANESTHETIST, CERTIFIED REGISTERED

## 2020-12-07 PROCEDURE — 761N000001 HC RECOVERY PHASE 1 LEVEL 1 FIRST HR: Performed by: INTERNAL MEDICINE

## 2020-12-07 PROCEDURE — 250N000003 HC SEVOFLURANE, EA 15 MIN: Performed by: INTERNAL MEDICINE

## 2020-12-07 PROCEDURE — 360N000015 HC SURGERY LEVEL 2 EA 15 ADDTL MIN: Performed by: INTERNAL MEDICINE

## 2020-12-07 DEVICE — IMPLANTABLE DEVICE
Type: IMPLANTABLE DEVICE | Site: ESOPHAGUS | Status: NON-FUNCTIONAL
Brand: ENDOMAXX®
Removed: 2021-02-08

## 2020-12-07 RX ORDER — SODIUM CHLORIDE, SODIUM LACTATE, POTASSIUM CHLORIDE, CALCIUM CHLORIDE 600; 310; 30; 20 MG/100ML; MG/100ML; MG/100ML; MG/100ML
INJECTION, SOLUTION INTRAVENOUS CONTINUOUS
Status: DISCONTINUED | OUTPATIENT
Start: 2020-12-07 | End: 2020-12-07 | Stop reason: HOSPADM

## 2020-12-07 RX ORDER — LIDOCAINE 40 MG/G
CREAM TOPICAL
Status: DISCONTINUED | OUTPATIENT
Start: 2020-12-07 | End: 2020-12-07 | Stop reason: HOSPADM

## 2020-12-07 RX ORDER — FENTANYL CITRATE 0.05 MG/ML
25-50 INJECTION, SOLUTION INTRAMUSCULAR; INTRAVENOUS
Status: DISCONTINUED | OUTPATIENT
Start: 2020-12-07 | End: 2020-12-07 | Stop reason: HOSPADM

## 2020-12-07 RX ORDER — NALOXONE HYDROCHLORIDE 0.4 MG/ML
0.4 INJECTION, SOLUTION INTRAMUSCULAR; INTRAVENOUS; SUBCUTANEOUS
Status: DISCONTINUED | OUTPATIENT
Start: 2020-12-07 | End: 2020-12-07 | Stop reason: HOSPADM

## 2020-12-07 RX ORDER — HEPARIN SODIUM,PORCINE 10 UNIT/ML
5-10 VIAL (ML) INTRAVENOUS
Status: DISCONTINUED | OUTPATIENT
Start: 2020-12-07 | End: 2020-12-07 | Stop reason: HOSPADM

## 2020-12-07 RX ORDER — FENTANYL CITRATE 50 UG/ML
INJECTION, SOLUTION INTRAMUSCULAR; INTRAVENOUS PRN
Status: DISCONTINUED | OUTPATIENT
Start: 2020-12-07 | End: 2020-12-07

## 2020-12-07 RX ORDER — HEPARIN SODIUM (PORCINE) LOCK FLUSH IV SOLN 100 UNIT/ML 100 UNIT/ML
5 SOLUTION INTRAVENOUS
Status: DISCONTINUED | OUTPATIENT
Start: 2020-12-07 | End: 2020-12-07 | Stop reason: HOSPADM

## 2020-12-07 RX ORDER — SODIUM CHLORIDE, SODIUM LACTATE, POTASSIUM CHLORIDE, CALCIUM CHLORIDE 600; 310; 30; 20 MG/100ML; MG/100ML; MG/100ML; MG/100ML
INJECTION, SOLUTION INTRAVENOUS CONTINUOUS PRN
Status: DISCONTINUED | OUTPATIENT
Start: 2020-12-07 | End: 2020-12-07

## 2020-12-07 RX ORDER — LIDOCAINE HYDROCHLORIDE 20 MG/ML
INJECTION, SOLUTION INFILTRATION; PERINEURAL PRN
Status: DISCONTINUED | OUTPATIENT
Start: 2020-12-07 | End: 2020-12-07

## 2020-12-07 RX ORDER — SCOLOPAMINE TRANSDERMAL SYSTEM 1 MG/1
1 PATCH, EXTENDED RELEASE TRANSDERMAL
Status: DISCONTINUED | OUTPATIENT
Start: 2020-12-07 | End: 2020-12-07 | Stop reason: HOSPADM

## 2020-12-07 RX ORDER — ONDANSETRON 2 MG/ML
INJECTION INTRAMUSCULAR; INTRAVENOUS PRN
Status: DISCONTINUED | OUTPATIENT
Start: 2020-12-07 | End: 2020-12-07

## 2020-12-07 RX ORDER — MEPERIDINE HYDROCHLORIDE 25 MG/ML
12.5 INJECTION INTRAMUSCULAR; INTRAVENOUS; SUBCUTANEOUS
Status: DISCONTINUED | OUTPATIENT
Start: 2020-12-07 | End: 2020-12-07 | Stop reason: HOSPADM

## 2020-12-07 RX ORDER — ONDANSETRON 2 MG/ML
4 INJECTION INTRAMUSCULAR; INTRAVENOUS EVERY 30 MIN PRN
Status: DISCONTINUED | OUTPATIENT
Start: 2020-12-07 | End: 2020-12-07 | Stop reason: HOSPADM

## 2020-12-07 RX ORDER — INDOMETHACIN 50 MG/1
100 SUPPOSITORY RECTAL
Status: DISCONTINUED | OUTPATIENT
Start: 2020-12-07 | End: 2020-12-07 | Stop reason: HOSPADM

## 2020-12-07 RX ORDER — PROPOFOL 10 MG/ML
INJECTION, EMULSION INTRAVENOUS PRN
Status: DISCONTINUED | OUTPATIENT
Start: 2020-12-07 | End: 2020-12-07

## 2020-12-07 RX ORDER — NALOXONE HYDROCHLORIDE 0.4 MG/ML
0.2 INJECTION, SOLUTION INTRAMUSCULAR; INTRAVENOUS; SUBCUTANEOUS
Status: DISCONTINUED | OUTPATIENT
Start: 2020-12-07 | End: 2020-12-07 | Stop reason: HOSPADM

## 2020-12-07 RX ORDER — HYDROMORPHONE HYDROCHLORIDE 1 MG/ML
.3-.5 INJECTION, SOLUTION INTRAMUSCULAR; INTRAVENOUS; SUBCUTANEOUS EVERY 10 MIN PRN
Status: DISCONTINUED | OUTPATIENT
Start: 2020-12-07 | End: 2020-12-07 | Stop reason: HOSPADM

## 2020-12-07 RX ORDER — NEOSTIGMINE METHYLSULFATE 1 MG/ML
VIAL (ML) INJECTION PRN
Status: DISCONTINUED | OUTPATIENT
Start: 2020-12-07 | End: 2020-12-07

## 2020-12-07 RX ORDER — DEXAMETHASONE SODIUM PHOSPHATE 4 MG/ML
INJECTION, SOLUTION INTRA-ARTICULAR; INTRALESIONAL; INTRAMUSCULAR; INTRAVENOUS; SOFT TISSUE PRN
Status: DISCONTINUED | OUTPATIENT
Start: 2020-12-07 | End: 2020-12-07

## 2020-12-07 RX ORDER — GLYCOPYRROLATE 0.2 MG/ML
INJECTION, SOLUTION INTRAMUSCULAR; INTRAVENOUS PRN
Status: DISCONTINUED | OUTPATIENT
Start: 2020-12-07 | End: 2020-12-07

## 2020-12-07 RX ORDER — HEPARIN SODIUM,PORCINE 10 UNIT/ML
5-10 VIAL (ML) INTRAVENOUS EVERY 24 HOURS
Status: DISCONTINUED | OUTPATIENT
Start: 2020-12-07 | End: 2020-12-07 | Stop reason: HOSPADM

## 2020-12-07 RX ORDER — ONDANSETRON 4 MG/1
4 TABLET, ORALLY DISINTEGRATING ORAL EVERY 30 MIN PRN
Status: DISCONTINUED | OUTPATIENT
Start: 2020-12-07 | End: 2020-12-07 | Stop reason: HOSPADM

## 2020-12-07 RX ADMIN — MIDAZOLAM 2 MG: 1 INJECTION INTRAMUSCULAR; INTRAVENOUS at 13:32

## 2020-12-07 RX ADMIN — PHENYLEPHRINE HYDROCHLORIDE 100 MCG: 10 INJECTION INTRAVENOUS at 13:50

## 2020-12-07 RX ADMIN — PROPOFOL 150 MG: 10 INJECTION, EMULSION INTRAVENOUS at 13:35

## 2020-12-07 RX ADMIN — ONDANSETRON 4 MG: 2 INJECTION INTRAMUSCULAR; INTRAVENOUS at 13:45

## 2020-12-07 RX ADMIN — HEPARIN SODIUM (PORCINE) LOCK FLUSH IV SOLN 100 UNIT/ML 5 ML: 100 SOLUTION at 15:08

## 2020-12-07 RX ADMIN — ROCURONIUM BROMIDE 30 MG: 10 INJECTION INTRAVENOUS at 13:36

## 2020-12-07 RX ADMIN — NEOSTIGMINE METHYLSULFATE 3 MG: 1 INJECTION, SOLUTION INTRAVENOUS at 14:09

## 2020-12-07 RX ADMIN — FENTANYL CITRATE 50 MCG: 50 INJECTION, SOLUTION INTRAMUSCULAR; INTRAVENOUS at 13:35

## 2020-12-07 RX ADMIN — GLYCOPYRROLATE 0.4 MG: 0.2 INJECTION, SOLUTION INTRAMUSCULAR; INTRAVENOUS at 14:09

## 2020-12-07 RX ADMIN — LIDOCAINE HYDROCHLORIDE 60 MG: 20 INJECTION, SOLUTION INFILTRATION; PERINEURAL at 13:35

## 2020-12-07 RX ADMIN — SCOPOLAMINE 1 PATCH: 1 PATCH TRANSDERMAL at 12:41

## 2020-12-07 RX ADMIN — SODIUM CHLORIDE, POTASSIUM CHLORIDE, SODIUM LACTATE AND CALCIUM CHLORIDE: 600; 310; 30; 20 INJECTION, SOLUTION INTRAVENOUS at 13:32

## 2020-12-07 RX ADMIN — FENTANYL CITRATE 50 MCG: 50 INJECTION, SOLUTION INTRAMUSCULAR; INTRAVENOUS at 13:58

## 2020-12-07 RX ADMIN — DEXAMETHASONE SODIUM PHOSPHATE 4 MG: 4 INJECTION, SOLUTION INTRA-ARTICULAR; INTRALESIONAL; INTRAMUSCULAR; INTRAVENOUS; SOFT TISSUE at 13:40

## 2020-12-07 ASSESSMENT — MIFFLIN-ST. JEOR: SCORE: 1294.88

## 2020-12-07 NOTE — ANESTHESIA PREPROCEDURE EVALUATION
Anesthesia Pre-Procedure Evaluation    Patient: Nathalie Bashir   MRN: [id number] : 1965          Preoperative Diagnosis: Dysphagia [R13.10]    Procedure(s):  ESOPHAGOGASTRODUODENOSCOPY (EGD)    Past Medical History:   Diagnosis Date     Allergic rhinitis      Cancer (H)     stomach cancer     Chronic pain      Depression      Lumbago      Migraine      Opioid dependence (H)      Other chronic pain     low back     Sacroiliitis (H)     low back pain     Trochanteric bursitis     leg length discrrepancy, hip pain     Past Surgical History:   Procedure Laterality Date     ARTHROPLASTY HIP Left 2016     BACK SURGERY      L4-5 decompression     C REPAIR DETACH RETINA,SCLERAL BUCKLE       CATARACT IOL, RT/LT       CHOLECYSTECTOMY N/A 2020    Procedure: Cholecystectomy;  Surgeon: Yandel Mallory MD;  Location: [facility]     ENDOSCOPIC RETROGRADE CHOLANGIOPANCREATOGRAM N/A 2020    Procedure: ENDOSCOPIC RETROGRADE CHOLANGIOPANCREATOGRAPHY  **Latex Allergy** with jejunal biopsies;  Surgeon: Jeet Aviles MD;  Location:  OR     ESOPHAGOSCOPY, GASTROSCOPY, DUODENOSCOPY (EGD), COMBINED N/A 3/3/2020    Procedure: ESOPHAGOGASTRODUODENOSCOPY, WITH ENDOSCOPIC US (enoxaparin);  Surgeon: Bakari Plata MD;  [facility]     EYE SURGERY       GASTRECTOMY N/A 10/21/2020    Procedure: Open subtotal gastectomy with David en Y Reconstruction; D1 Lymph Node Disection  **Latex Allergy**;  Surgeon: Yandel Mallory MD;  Location:  OR     IR CHEST PORT PLACEMENT > 5 YRS OF AGE  3/26/2020     LAPAROSCOPY DIAGNOSTIC (GENERAL) N/A 10/13/2020    Procedure: Diagnostic laparoscopy with peritoneal washings  **Latex Allergy**;  Surgeon: Yandel Mallory MD;  Location: [facility]     OPEN REDUCTION INTERNAL FIXATION RODDING INTRAMEDULLARY FEMUR Left 2014    Procedure: OPEN REDUCTION INTERNAL FIXATION RODDING INTRAMEDULLARY FEMUR;  Surgeon: Arnol Santiago MD;  Location:  OR     ORTHOPEDIC SURGERY       PICC DOUBLE  LUMEN PLACEMENT Right 02/07/2020    5 fr double lumen 41 cm     REMOVE HARDWARE LOWER EXTREMITY Left 4/7/2015    Procedure: REMOVE HARDWARE LOWER EXTREMITY;  Surgeon: Arnol Santiago MD;  [facility]: [facility]     REMOVE HARDWARE RODDING INTRAMEDULLARY FEMUR Left 12/17/2015    Procedure: REMOVE HARDWARE RODDING INTRAMEDULLARY FEMUR;  Surgeon: Arnol Santiago MD;  [facility]     RESECT BONE LOWER EXTREMITY Left 12/23/2014    Procedure: RESECT BONE LOWER EXTREMITY;  Surgeon: Arnol Santiago MD;  Location[facility]     WHIPPLE PROCEDURE N/A 1/28/2020    Procedure: Open Whipple procedure and Cholecystectomy;  Surgeon: Yandel Mallory MD;  Location: [facility]     WHIPPLE PROCEDURE         Anesthesia Evaluation     . Pt has had prior anesthetic.     No history of anesthetic complications          ROS/MED HX    ENT/Pulmonary:  - neg pulmonary ROS     Neurologic:     (+)migraines,     Cardiovascular:  - neg cardiovascular ROS       METS/Exercise Tolerance:     Hematologic:     (+) Anemia, -      Musculoskeletal:  - neg musculoskeletal ROS       GI/Hepatic:     (+) GERD Asymptomatic on medication,       Renal/Genitourinary:  - ROS Renal section negative       Endo:  - neg endo ROS       Psychiatric:     (+) psychiatric history anxiety and depression      Infectious Disease:  - neg infectious disease ROS       Malignancy:   (+) Malignancy History of GI  GI CA  Active status post Surgery and Chemo,         Other:    (+) No chance of pregnancy                         Physical Exam  Normal systems: cardiovascular and pulmonary    Airway   Mallampati: II  TM distance: >3 FB  Neck ROM: full    Dental   (+) chipped    Cardiovascular       Pulmonary             Lab Results   Component Value Date    WBC 5.8 11/20/2020    HGB 9.8 (L) 11/20/2020    HCT 32.2 (L) 11/20/2020     11/20/2020    CRP 11.0 (H) 07/29/2020    SED 27 07/20/2020     11/20/2020    POTASSIUM 3.5 11/20/2020    CHLORIDE 109 11/20/2020    CO2 26 11/20/2020  "   BUN 5 (L) 11/20/2020    CR 0.71 11/20/2020     (H) 11/20/2020    PETRA 8.5 11/20/2020    PHOS 3.7 11/06/2020    MAG 2.0 11/06/2020    ALBUMIN 2.7 (L) 11/20/2020    PROTTOTAL 6.5 (L) 11/20/2020    ALT 16 11/20/2020    AST 29 11/20/2020    ALKPHOS 133 11/20/2020    BILITOTAL 0.2 11/20/2020    LIPASE 24 (L) 11/04/2020    AMYLASE 13 (L) 01/31/2020    INR 1.07 11/05/2020    FIBR 348 07/22/2020    TSH 4.16 (H) 07/26/2020    T4 1.38 07/26/2020    HCG Negative 10/21/2020       Preop Vitals  BP Readings from Last 3 Encounters:   11/20/20 120/76   11/06/20 112/58   10/26/20 105/53    Pulse Readings from Last 3 Encounters:   11/20/20 89   11/06/20 87   10/26/20 98      Resp Readings from Last 3 Encounters:   11/20/20 16   11/06/20 16   10/26/20 16    SpO2 Readings from Last 3 Encounters:   11/20/20 99%   11/06/20 97%   10/26/20 95%      Temp Readings from Last 1 Encounters:   11/20/20 36.9  C (98.5  F) (Tympanic)    Ht Readings from Last 1 Encounters:   11/20/20 1.727 m (5' 8\")      Wt Readings from Last 1 Encounters:   11/20/20 70.8 kg (156 lb 1.6 oz)    Estimated body mass index is 23.73 kg/m  as calculated from the following:    Height as of 11/20/20: 1.727 m (5' 8\").    Weight as of 11/20/20: 70.8 kg (156 lb 1.6 oz).       Anesthesia Plan      History & Physical Review  History and physical reviewed and following examination; no interval change.    ASA Status:  3 .    NPO Status:  > 6 hours    Plan for General with Intravenous induction. Maintenance will be TIVA.    PONV prophylaxis:  Ondansetron (or other 5HT-3) and Scopolamine patch         Postoperative Care  Postoperative pain management:  Oral pain medications.      Consents  Anesthetic plan, risks, benefits and alternatives discussed with:  Patient..                 Azam Leach MD  "

## 2020-12-07 NOTE — DISCHARGE INSTRUCTIONS
Information for Patients Discharging with a Transderm Scopolamine Patch     Dry mouth is a common side effect.    Drowsiness is another common side effect especially when combined with pain medication.  Please avoid activities that require mental alertness such as driving a car or making important legal decisions.    Since Scopolamine can cause temporary dilation of the pupils and blurred vision if it comes in contact with the eyes; be sure to wash your hands thoroughly with soap and water immediately after handling the patch.   When you remove your patch, please stick it to a tissue or paper towel for disposal.      Remove the patch immediately and contact a physician in the unlikely event that you experience symptoms of acute glaucoma (pain and reddening of the eyes, accompanied by dilated pupils).    Remove the patch if you develop any difficulties urinating.  If you cannot urinate after removing your patch, please notify your surgeon.    Remove the patch 24 hours after surgery.      Same Day Surgery Discharge Instructions for  Sedation and General Anesthesia       It's not unusual to feel dizzy, light-headed or faint for up to 24 hours after surgery or while taking pain medication.  If you have these symptoms: sit for a few minutes before standing and have someone assist you when you get up to walk or use the bathroom.      You should rest and relax for the next 24 hours. We recommend you make arrangements to have an adult stay with you for at least 24 hours after your discharge.  Avoid hazardous and strenuous activity.      DO NOT DRIVE any vehicle or operate mechanical equipment for 24 hours following the end of your surgery.  Even though you may feel normal, your reactions may be affected by the medication you have received.      Do not drink alcoholic beverages for 24 hours following surgery.       Slowly progress to your regular diet as you feel able. It's not unusual to feel nauseated and/or vomit after  receiving anesthesia.  If you develop these symptoms, drink clear liquids (apple juice, ginger ale, broth, 7-up, etc. ) until you feel better.  If your nausea and vomiting persists for 24 hours, please notify your surgeon.        All narcotic pain medications, along with inactivity and anesthesia, can cause constipation. Drinking plenty of liquids and increasing fiber intake will help.      For any questions of a medical nature, call your surgeon.      Do not make important decisions for 24 hours.      If you had general anesthesia, you may have a sore throat for a couple of days related to the breathing tube used during surgery.  You may use Cepacol lozenges to help with this discomfort.  If it worsens or if you develop a fever, contact your surgeon.       If you feel your pain is not well managed with the pain medications prescribed by your surgeon, please contact your surgeon's office to let them know so they can address your concerns.       CoVid 19 Information    We want to give you information regarding Covid. Please consult your primary care provider with any questions you might have.     Patient who have symptoms (cough, fever, or shortness of breath), need to isolate for 7 days from when symptoms started OR 72 hours after fever resolves (without fever reducing medications) AND improvement of respiratory symptoms (whichever is longer).      Isolate yourself at home (in own room/own bathroom if possible)    Do Not allow any visitors    Do Not go to work or school    Do Not go to [employer],  centers, shopping, or other public places.    Do Not shake hands.    Avoid close and intimate contact with others (hugging, kissing).    Follow CDC recommendations for household cleaning of frequently touched services.     After the initial 7 days, continue to isolate yourself from household members as much as possible. To continue decrease the risk of community spread and exposure, you and any members of your  household should limit activities in public for 14 days after starting home isolation.     You can reference the following CDC link for helpful home isolation/care tips:  https://www.cdc.gov/coronavirus/2019-ncov/downloads/10Things.pdf    Protect Others:    Cover Your Mouth and Nose with a mask, disposable tissue or wash cloth to avoid spreading germs to others.    Wash your hands and face frequently with soap and water    Call Your Primary Doctor If: Breathing difficulty develops or you become worse.    For more information about COVID19 and options for caring for yourself at home, please visit the CDC website at https://www.cdc.gov/coronavirus/2019-ncov/about/steps-when-sick.html  For more options for care at [facility], please visit our website at [email or web address]

## 2020-12-07 NOTE — OR NURSING
Discharge instructions reviewed via phone with spouse and patient. Printed form sent with patient.

## 2020-12-07 NOTE — OR NURSING
PT. HAS NUMBNESS AND TINGLING FROM TIPS OF TOES TO KNEES BILATERAL LOWER EXTREMITIES, NUMBNESS AND TINGLING FROM TIPS OF FINGERS TO MID FOREARMS SINCE CHEMOTHERAPY.

## 2020-12-07 NOTE — ANESTHESIA PROCEDURE NOTES
Airway   Date/Time: 12/7/2020 1:38 PM   Patient location during procedure: OR    Staff -   Anesthesiologist:  Azam Leach MD  CRNA: Berta Harris APRN CRNA  Performed By: CRNA    Consent for Airway   Urgency: elective    Indications and Patient Condition  Indications for airway management: sylvia-procedural  Induction type:intravenousMask difficulty assessment: 2 - vent by mask + OA or adjuvant +/- NMBA    Final Airway Details  Final airway type: endotracheal airway  Successful airway:ETT - single  Endotracheal Airway Details   ETT size (mm): 7.0  Cuffed: yes  Cuff volume (mL): 8  Successful intubation technique: direct laryngoscopy  Grade View of Cords: 1  Adjucts: stylet  Measured from: gums/teeth  Secured at (cm): 21  Secured with: pink tape  Bite Block used: endo bite block.    Post intubation assessment   Placement verified by: capnometry, equal breath sounds and chest rise   Number of attempts at approach: 1  Secured with:pink tape  Ease of procedure: easy  Dentition: Intact and Unchanged

## 2020-12-07 NOTE — OR NURSING
EGD w/ esophageal stent placement done in OR 24 w/ Dr. Aviles. No specimens obtained and sedation per anesthesia.

## 2020-12-07 NOTE — ANESTHESIA CARE TRANSFER NOTE
Patient: Nathalie Bashir    Procedure(s):  Upper Endoscopy with stent placement    Diagnosis: Dysphagia [R13.10]  Diagnosis Additional Information: No value filed.    Anesthesia Type:   MAC     Note:  Airway :Face Mask  Patient transferred to:PACU  Comments: Spontaneously breathing.  Adequate tidal volumes.  Suctioned.  VSS. Extubated awake without complications.  O2 via FM. To PACU. Handoff Report: Identifed the Patient, Identified the Reponsible Provider, Reviewed the pertinent medical history, Discussed the surgical course, Reviewed Intra-OP anesthesia mangement and issues during anesthesia, Set expectations for post-procedure period and Allowed opportunity for questions and acknowledgement of understanding      Vitals: (Last set prior to Anesthesia Care Transfer)    CRNA VITALS  12/7/2020 1347 - 12/7/2020 1420      12/7/2020             Pulse:  110    Ht Rate:  111    SpO2:  100 %    Resp Rate (set):  10                Electronically Signed By: NINA Durán CRNA  December 7, 2020  2:20 PM

## 2020-12-07 NOTE — ANESTHESIA POSTPROCEDURE EVALUATION
Patient: Nathalie Bashir    Procedure(s):  Upper Endoscopy with stent placement    Diagnosis:Dysphagia [R13.10]  Diagnosis Additional Information: No value filed.    Anesthesia Type:  General    Note:  Anesthesia Post Evaluation    Patient location during evaluation: PACU  Patient participation: Able to fully participate in evaluation  Level of consciousness: awake and alert  Pain management: adequate  Airway patency: patent  Cardiovascular status: acceptable  Respiratory status: acceptable  Hydration status: acceptable  PONV: none     Anesthetic complications: None          Last vitals:  Vitals:    12/07/20 1430 12/07/20 1445 12/07/20 1500   BP: 123/83 122/55 121/80   Pulse: 79 80 82   Resp: 14 14 12   Temp: 36.6  C (97.9  F) 36.1  C (97  F) 36.1  C (97  F)   SpO2: 100% 100% 99%         Electronically Signed By: Azam Leach MD  December 7, 2020  4:44 PM

## 2020-12-08 ENCOUNTER — PATIENT OUTREACH (OUTPATIENT)
Dept: PALLIATIVE CARE | Facility: CLINIC | Age: 55
End: 2020-12-08

## 2020-12-08 DIAGNOSIS — G89.18 POSTOPERATIVE PAIN: ICD-10-CM

## 2020-12-08 RX ORDER — OXYCODONE HYDROCHLORIDE 5 MG/1
5-10 TABLET ORAL EVERY 6 HOURS PRN
Qty: 60 TABLET | Refills: 0 | Status: SHIPPED | OUTPATIENT
Start: 2020-12-08 | End: 2020-12-23

## 2020-12-08 NOTE — PROGRESS NOTES
Received additional VM from patient today asking for update on med refill. She reports that she is not doing as well today, having burning pain in feet, shin splint like pain, and knee pain.

## 2020-12-08 NOTE — PROGRESS NOTES
"LATE ENTRY NOTE FROM 12/7:      Patient called requesting refill of oxycodone. She states that she has been taking 2 tabs Q6H, but that she thinks she can go down to 1 tab BID again.     Reviewed notes - it appears patient was supposed to be working on getting back down to 1 tab BID, but unclear the expectation on how long that would take.     Last refill: 11/25/2020  Last office visit: 11/12/2020  Scheduled for follow up 1/7/2020     Will route request to MD for review.       Per last office visit note: \"She is seen today for guidance on how to taper her medications in the postoperative period.  She is currently taking oxycodone 10 mg three times per day.  Recommended decreasing to 10 mg BID x1 week, then returning to 5 mg BID PRN. She has primarily been taking the oxycodone longer term for neuropathic pain. Follow-up appointment is scheduled for 1/7/2021, or sooner if additional concerns arise.\"    Per last refill note: \"maribeth is almost back to her baseline usage of 2 tabs a day.  She still is having the sensation of food being stuck in her stomach.\"    Reviewed [place]  Report.    "

## 2020-12-09 ENCOUNTER — PATIENT OUTREACH (OUTPATIENT)
Dept: SURGERY | Facility: CLINIC | Age: 55
End: 2020-12-09

## 2020-12-09 DIAGNOSIS — R10.13 ABDOMINAL PAIN, EPIGASTRIC: ICD-10-CM

## 2020-12-09 DIAGNOSIS — D49.0 IPMN (INTRADUCTAL PAPILLARY MUCINOUS NEOPLASM): ICD-10-CM

## 2020-12-09 DIAGNOSIS — C16.3 MALIGNANT NEOPLASM OF PYLORIC ANTRUM (H): ICD-10-CM

## 2020-12-09 RX ORDER — PANTOPRAZOLE SODIUM 40 MG/1
40 TABLET, DELAYED RELEASE ORAL 2 TIMES DAILY
Qty: 60 TABLET | Refills: 11 | Status: SHIPPED | OUTPATIENT
Start: 2020-12-09 | End: 2021-09-28

## 2020-12-11 ENCOUNTER — PREP FOR PROCEDURE (OUTPATIENT)
Dept: GASTROENTEROLOGY | Facility: CLINIC | Age: 55
End: 2020-12-11

## 2020-12-11 ENCOUNTER — PATIENT OUTREACH (OUTPATIENT)
Dept: GASTROENTEROLOGY | Facility: CLINIC | Age: 55
End: 2020-12-11

## 2020-12-11 DIAGNOSIS — Z98.890 HISTORY OF GASTRIC SURGERY: Primary | ICD-10-CM

## 2020-12-11 NOTE — TELEPHONE ENCOUNTER
"Post Upper GI (2020) with Dr. Aviles: Follow-up    Post procedure recommendations:   A solid diet may resume as tolerated, though this should be low roughage   With improvement in oral intake, repeat upper endoscopy in 2m with plans for stent exchange or  removal penidng findings     - With initial improvement followed by recurrent  symptoms, or acute abdominal pain, the patient is   instructed to alert our team without delay which  will prompt abdominal X ray to exclude migration                             Orders placed:   Please assist in scheduling:     Procedure/Imaging/Clinic: EGD  Physician: Dr. Aviles  Timin months  Procedure length:50 min  Anesthesia:general  Dx: history of gastric surgery  Tier:2  Location: [facility]     Patient states:has had pain in \"esophagus\", \"uncomfortable\", eating is going ok, intermittent gas.No concerning symptoms. Understands low roughage diet, and follow up plan.    Clinic contact and scheduling numbers verified for future questions/concerns.    Sofia George RN Care Coordinator        "

## 2020-12-15 ENCOUNTER — PATIENT OUTREACH (OUTPATIENT)
Dept: GASTROENTEROLOGY | Facility: CLINIC | Age: 55
End: 2020-12-15

## 2020-12-15 DIAGNOSIS — R11.2 NAUSEA & VOMITING: Primary | ICD-10-CM

## 2020-12-15 NOTE — TELEPHONE ENCOUNTER
"Pt called in stating she feels like \"she has to throw up all the time\". Per Dr. Aviles's procedure note:  With initial improvement followed by recurrent  symptoms, or acute abdominal pain, the patient is   instructed to alert our team without delay which  will prompt abdominal X ray to exclude migration       Left message for patient. Orders placed for stent xray.    ML    "

## 2020-12-16 NOTE — TELEPHONE ENCOUNTER
Returned patient call to f/ up on symptoms. Feels like she has to throw up all the time.   Ate this am, food got stuck, she threw it up. Feels like she's back where she was before the scope.  Did feel better for a while after the stent placement but not any more.      Reviewed need for xray that she can have done locally at [facility] location. Pt will schedule xray.    ML

## 2020-12-17 ENCOUNTER — ANCILLARY PROCEDURE (OUTPATIENT)
Dept: GENERAL RADIOLOGY | Facility: CLINIC | Age: 55
End: 2020-12-17
Attending: INTERNAL MEDICINE
Payer: MEDICARE

## 2020-12-17 DIAGNOSIS — R11.2 NAUSEA & VOMITING: ICD-10-CM

## 2020-12-17 PROCEDURE — 74018 RADEX ABDOMEN 1 VIEW: CPT | Performed by: RADIOLOGY

## 2020-12-18 NOTE — TELEPHONE ENCOUNTER
Per Dr. Aviles  The stent looks like its in stable position   I think the next step is an upper GI study to see what happens with contrast   If it pools above the stent then we can replace/reposition the stent     Upper GI study ordered. Pt will call to schedule.    ML

## 2020-12-21 ENCOUNTER — PATIENT OUTREACH (OUTPATIENT)
Dept: PALLIATIVE CARE | Facility: CLINIC | Age: 55
End: 2020-12-21

## 2020-12-21 ENCOUNTER — TELEPHONE (OUTPATIENT)
Dept: SURGERY | Facility: CLINIC | Age: 55
End: 2020-12-21

## 2020-12-21 DIAGNOSIS — G89.18 POSTOPERATIVE PAIN: ICD-10-CM

## 2020-12-21 DIAGNOSIS — K31.89 STOMACH SPASM: Primary | ICD-10-CM

## 2020-12-21 RX ORDER — OXYCODONE HYDROCHLORIDE 5 MG/1
5-10 TABLET ORAL EVERY 6 HOURS PRN
Qty: 60 TABLET | Refills: 0 | OUTPATIENT
Start: 2020-12-24

## 2020-12-21 NOTE — TELEPHONE ENCOUNTER
Called pt back regarding symptomatic vm left on Dr. Mallory's RNCC machine. Pt stated left side abd pain increasing, she is not sure if from past surgery 10/21 with Dr. Mallory. She has been using Oxy 10 mg every 6 hours, alternating with ibuprofen 600 mg every 6-8 hours as needed. State pain is 5/10 now, feels pain is only relieved up to 4 hours after taking meds and unsure if she can increase or try something different. This morning took Oxy at 6 am and will be due at noon, called palliative care for a refill and was told too early to fill as her script allowed only max 3 pills/day and she was given #60 on 12/8. Was told palliative care would reach out tomorrow when provider in clinic, she's concerned she will run out today.    Advised next dose to try Oxy 5 mg along with Ibuprofen 600 mg every 6 hours, she has only been taking Ibuprofen as needed when Oxy wasn't lasting long enough. Advised since decreasing dose of Oxy, doing Ibuprofen scheduled may help. If pain is unmanageable at home or she does run out tonight to seek care at local ED, she verbalized understanding and that she was ok with plan.

## 2020-12-21 NOTE — PROGRESS NOTES
Received VM from patient requesting refill of oxycodone. She notes she is having more pain in her left rib area, she states she has contacted Dr. Mallory about this.     Called patient to discuss as she received what should have been a 20 day supply 12/8. She reports has had awful pain in her side and so only has enough oxycodone left for today. When I asked her count she states she has 4 tabs left.     She reports she has been taking 2 tabs every 6 hours like the bottle says - thinks 6 tabs per day.     We discussed at length that when we last spoke she was taking 3 tabs per day and that is what the script was written for so it was intended to last 20 days. She verbalized agreement that was what we discussed. She knows she was supposed to be working on decreasing, but tells me pain in her side and neuropathy pain in her feet has been really bad.      Shared with her my concern that MD might not be okay with early refill and that her insurance also might not be okay with early refill. Her refills have been coming from Dr. Scott who is off today, but back tomorrow. She is okay waiting for Dr. Scott to review tomorrow and understands she needs to make her 4 tabs last.      Last refill: 12/8/2020  Last office visit: 11/12/2020  Scheduled for follow up 1/7/2021     Will route request to MD for review.     Reviewed [place]  Report.

## 2020-12-22 NOTE — PROGRESS NOTES
Called patient and let her know Dr. Chauhan was not able to sign off on script due to it being early, advised this information has been sent to Dr. Scott for review when she returns tomorrow. She verbalized understanding and thanked me for the call with the update.

## 2020-12-23 RX ORDER — OXYCODONE HYDROCHLORIDE 5 MG/1
5 TABLET ORAL 3 TIMES DAILY PRN
Qty: 42 TABLET | Refills: 0 | Status: SHIPPED | OUTPATIENT
Start: 2020-12-23 | End: 2021-01-04

## 2020-12-23 RX ORDER — BACLOFEN 5 MG/1
5 TABLET ORAL 3 TIMES DAILY
Qty: 60 TABLET | Refills: 3 | Status: SHIPPED | OUTPATIENT
Start: 2020-12-23 | End: 2021-01-07 | Stop reason: ALTCHOICE

## 2020-12-23 NOTE — PROGRESS NOTES
Spoke with MD - she will okay refill of oxycodone for 5mg TID PRN. She also would like to add in baclofen TID scheduled. Called patient and talked her through this recommendation. She has been on baclofen in the past and didn't think it was helpful, but willing to try again at MD's recommendation. Advised MD will send scripts for her - will attempt to call pharmacy when scripts are sent as I worry insurance may not allow the early refill. She is aware this could be an issue.

## 2020-12-24 ENCOUNTER — ANCILLARY PROCEDURE (OUTPATIENT)
Dept: GENERAL RADIOLOGY | Facility: CLINIC | Age: 55
End: 2020-12-24
Attending: INTERNAL MEDICINE
Payer: MEDICARE

## 2020-12-24 DIAGNOSIS — R11.2 NAUSEA & VOMITING: ICD-10-CM

## 2020-12-24 PROCEDURE — 74240 X-RAY XM UPR GI TRC 1CNTRST: CPT | Mod: GC | Performed by: RADIOLOGY

## 2020-12-29 ENCOUNTER — PATIENT OUTREACH (OUTPATIENT)
Dept: GASTROENTEROLOGY | Facility: CLINIC | Age: 55
End: 2020-12-29

## 2020-12-29 NOTE — TELEPHONE ENCOUNTER
Called patient to f/ up on letter from Dr. Aviles after recent imaging. Clinic scheduled to further discuss stent removal/symptoms.    ML

## 2021-01-04 ENCOUNTER — MYC REFILL (OUTPATIENT)
Dept: PALLIATIVE CARE | Facility: CLINIC | Age: 56
End: 2021-01-04

## 2021-01-04 DIAGNOSIS — G89.18 POSTOPERATIVE PAIN: ICD-10-CM

## 2021-01-05 NOTE — TELEPHONE ENCOUNTER
Spoke with patient yesterday - she told me that she was requesting oxycodone as she wasn't sure if she needed to give us a few days notice even though she is seeing us on Thursday. Advised we gave her enough to get to the apt and then will discuss next steps with her Thursday when we see her.     She advised that she hasn't been able to get the baclofen yet - thinks the pharmacist is hoping it up as it interacts with oxycodone? She asks if flexeril is an option? Thinks that worked better in the past.     Called pharmacy - they advised patient needs a PA for baclofen, its an insurance delay not them holding it up. Insurance info below.     Requested PA via [email or web address]    ID # [id number]   BIN # [id number]   PCN # [id number]   Phone # [phone]     Will update MD.

## 2021-01-06 RX ORDER — OXYCODONE HYDROCHLORIDE 5 MG/1
5 TABLET ORAL 3 TIMES DAILY PRN
Qty: 42 TABLET | Refills: 0 | Status: SHIPPED | OUTPATIENT
Start: 2021-01-06 | End: 2021-01-18

## 2021-01-07 ENCOUNTER — VIRTUAL VISIT (OUTPATIENT)
Dept: PALLIATIVE CARE | Facility: CLINIC | Age: 56
End: 2021-01-07
Attending: INTERNAL MEDICINE
Payer: MEDICARE

## 2021-01-07 DIAGNOSIS — R10.13 ABDOMINAL PAIN, EPIGASTRIC: ICD-10-CM

## 2021-01-07 DIAGNOSIS — G62.9 NEUROPATHY: ICD-10-CM

## 2021-01-07 DIAGNOSIS — R11.0 NAUSEA: Primary | ICD-10-CM

## 2021-01-07 PROCEDURE — 999N001193 HC VIDEO/TELEPHONE VISIT; NO CHARGE

## 2021-01-07 PROCEDURE — 99214 OFFICE O/P EST MOD 30 MIN: CPT | Mod: 95 | Performed by: FAMILY MEDICINE

## 2021-01-07 RX ORDER — PROCHLORPERAZINE MALEATE 10 MG
10 TABLET ORAL EVERY 6 HOURS PRN
Qty: 60 TABLET | Refills: 3 | Status: SHIPPED | OUTPATIENT
Start: 2021-01-07 | End: 2022-05-03

## 2021-01-07 RX ORDER — AMITRIPTYLINE HYDROCHLORIDE 100 MG/1
100 TABLET ORAL AT BEDTIME
Qty: 30 TABLET | Refills: 3 | Status: SHIPPED | OUTPATIENT
Start: 2021-01-07 | End: 2022-07-11 | Stop reason: DRUGHIGH

## 2021-01-07 NOTE — LETTER
1/7/2021       RE: Nathalie Bashir  [address] [address]     Dear Colleague,    Thank you for referring your patient, Nathalie Bashir, to the [facility] at [facility]. Please see a copy of my visit note below.    Nathalie is a 55 year old who is being evaluated via a billable video visit.      How would you like to obtain your AVS? MyChart  If the video visit is dropped, the invitation should be resent by: Text to cell phone: [phone]  Will anyone else be joining your video visit? No       STEPHENIE Sanches    Video-Visit Details    Type of service:  Video Visit    Video Start Time: 4:04 PM    Video End Time:4:48 PM    Originating Location (pt. Location): Home    Distant Location (provider location):  [facility]     Platform used for Video Visit: [facility]     Palliative Care Outpatient Clinic Progress Note    Patient Name:  Nathalie Bashir  Primary Provider:  Marianne Gonzalez    Chief Complaint: follow-up abdominal pain, fullness, neuropathic pain    Interim History:  Nathalie Bashir is a 55 year old female returns to be seen by palliative care today. She has a history significant for gastric adenocarcinoma found during Whipple in 1/2020 for presumed IPMN, no pancreatic malignancy found at that time. S/p neoadjuvant chemotherapy and surgery with curative intent on 10/21/20. Surgery was extensive and included partial omentectomy, resection of GJ anastomosis with en bloc subtotal gastrectomy, modified lymphadenectomy, and David-en-Y Gastrojejunostomy Reconstruction. She has since also undergone an upper GI endoscopy with stenting of the jejunum due to some mild stenosis following her surgeries noted above.     She reports that she has overall been doing okay, sharing that she has some good days and some bad days.  She continues to have some ongoing difficulty with abdominal pain, fullness, nausea, and  "vomiting. There was some concern that she was having difficulty with abdominal spasm, so a trial of baclofen was ordered, but has not yet been initiated due to insurance coverage. She describes the abdominal pain/discomfort that she is having like a constant pressure sensation.  She notes that this can be worsened by eating - but does not always worsen after eating.  She is tolerating the majority of a regular diet with the exception of steak & prime rib. She often continues to have significant nausea symptoms, and sometimes feels that the pressure can only be relieved by vomiting.  She has been taking Zofran consistently 1-4 times per day with modest improvement in symptoms. She has also been taking pantoprazole, famotidine, and carafate with some improvement in her symptoms.     She also continues to have difficulty with neuropathy pain.  She has neuropathy in her hands and feet. She shares that by the end of the day her hands are uncomfortable and \"cramped up\". She also describes a sensation of numbness, and a burning pain sensation in her feet.  She feels that her feet have also been more swollen and she has had some difficulty walking.  She has had two recent falls in her home, with one fall resulting in an injury to her toe. She also describes some muscle cramping in her legs and wonders whether a muscle relaxer may be helpful for this. She has been taking oxycodone 5 mg TID and ibuprofen 600 mg daily.     Coping:  She feels that she is overall coping well in terms of her cancer diagnosis now s/p extensive surgery and treatment.  She has some mild difficulty coping with various sources of ongoing pain.     Social History:  Pertinent changes to social history/social situation since last visit: None  Key support resources:  Ti  Advance Directive Status:  Not on file  Social History     Tobacco Use     Smoking status: Former Smoker     Packs/day: 0.50     Years: 25.00     Pack years: 12.50     Types: " Cigarettes     Quit date: 2020     Years since quittin.0     Smokeless tobacco: Never Used   Substance Use Topics     Alcohol use: No     Drug use: No         Allergies   Allergen Reactions     Gluten Meal Palpitations     Augmentin Rash     Patient tolerated Zosyn in 2020     Oxycontin [Oxycodone]      Confusion, loopy, oxycodone is tolerated     Pregabalin      interfered with Cymbalta     Topiramate      Tongue numbness, taste alteration, irritable      Acetaminophen Nausea     Urine retention       [place] Extract      Gabapentin Dizziness and GI Disturbance     Ketorolac Headache     Latex Rash     Methadone Fatigue     Mold      Naprosyn [Naproxen] Dizziness and GI Disturbance     Seasonal Allergies      Current Outpatient Medications   Medication Sig Dispense Refill     amitriptyline (ELAVIL) 100 MG tablet Take 1 tablet (100 mg) by mouth At Bedtime 30 tablet 3     amylase-lipase-protease (CREON 24) 21380-88978 units CPEP per EC capsule 2 capsules with meals and 1 capsule with snacks 240 capsule 4     citalopram (CELEXA) 20 MG tablet Take 20 mg by mouth every morning        famotidine (PEPCID) 20 MG tablet Take 20 mg by mouth 2 times daily as needed        ibuprofen (ADVIL/MOTRIN) 600 MG tablet Take 1 tablet (600 mg) by mouth every 6 hours as needed for moderate pain 50 tablet 0     loperamide (IMODIUM) 2 MG capsule Take 4 mg by mouth 4 times daily as needed for diarrhea        loratadine-pseudoePHEDrine (CLARITIN-D 24-HOUR)  MG 24 hr tablet Take 1 tablet by mouth daily as needed        magnesium 250 MG tablet Take 1 tablet by mouth daily       metoclopramide (REGLAN) 5 MG tablet Take 5 mg by mouth 2 times daily as needed       ondansetron (ZOFRAN) 8 MG tablet Take 1 tablet (8 mg) by mouth every 8 hours as needed (Nausea/Vomiting) 90 tablet 2     oxyCODONE (ROXICODONE) 5 MG tablet Take 1 tablet (5 mg) by mouth 3 times daily as needed for moderate to severe pain Maximum 3 pills/day. 42 tablet  0     pantoprazole (PROTONIX) 40 MG EC tablet Take 1 tablet (40 mg) by mouth 2 times daily 60 tablet 11     potassium chloride ER (KLOR-CON M) 20 MEQ CR tablet TAKE ONE TABLET BY MOUTH ONCE DAILY (Patient taking differently: daily as needed ) 30 tablet 1     prochlorperazine (COMPAZINE) 10 MG tablet Take 1 tablet (10 mg) by mouth every 6 hours as needed for nausea or vomiting 60 tablet 3     simethicone 80 MG TABS Take 1 tablet by mouth every 6 hours as needed (bloating, gas, belching) 90 tablet 3     sucralfate (CARAFATE) 1 GM tablet Take 1 tablet (1 g) by mouth 4 times daily as needed (reflux.) 360 tablet 6     SUMAtriptan (IMITREX) 100 MG tablet Take 100 mg by mouth at onset of headache for migraine       Past Medical History:   Diagnosis Date     Allergic rhinitis      Cancer (H)     stomach cancer     Chronic pain      Depression      Lumbago      Migraine      Opioid dependence (H)      Other chronic pain     low back     Sacroiliitis (H)     low back pain     Trochanteric bursitis     leg length discrrepancy, hip pain     Past Surgical History:   Procedure Laterality Date     ARTHROPLASTY HIP Left 2016     BACK SURGERY      L4-5 decompression     C REPAIR DETACH RETINA,SCLERAL BUCKLE       CATARACT IOL, RT/LT       CHOLECYSTECTOMY N/A 1/28/2020    Procedure: Cholecystectomy;  Surgeon: Yandel Mallory MD;  Location:  OR     ENDOSCOPIC RETROGRADE CHOLANGIOPANCREATOGRAM N/A 7/27/2020    Procedure: ENDOSCOPIC RETROGRADE CHOLANGIOPANCREATOGRAPHY  **Latex Allergy** with jejunal biopsies;  Surgeon: Jeet Aviles MD;  Location:  OR     ESOPHAGOSCOPY, GASTROSCOPY, DUODENOSCOPY (EGD), COMBINED N/A 3/3/2020    Procedure: ESOPHAGOGASTRODUODENOSCOPY, WITH ENDOSCOPIC US (enoxaparin);  Surgeon: Bakari Plata MD;  Location:  GI     ESOPHAGOSCOPY, GASTROSCOPY, DUODENOSCOPY (EGD), COMBINED N/A 12/7/2020    Procedure: Upper Endoscopy with stent placement;  Surgeon: Jeet Aviles MD;  Location:   OR     EYE SURGERY       GASTRECTOMY N/A 10/21/2020    Procedure: Open subtotal gastectomy with David en Y Reconstruction; D1 Lymph Node Disection  **Latex Allergy**;  Surgeon: Yandel Mallory MD;  Location: [facility] OR     IR CHEST PORT PLACEMENT > 5 YRS OF AGE  3/26/2020     LAPAROSCOPY DIAGNOSTIC (GENERAL) N/A 10/13/2020    Procedure: Diagnostic laparoscopy with peritoneal washings  **Latex Allergy**;  Surgeon: Yandel Mallory MD;  Location: [facility] OR     OPEN REDUCTION INTERNAL FIXATION RODDING INTRAMEDULLARY FEMUR Left 12/23/2014    Procedure: OPEN REDUCTION INTERNAL FIXATION RODDING INTRAMEDULLARY FEMUR;  Surgeon: Arnol Santiago MD;  Location: [facility] OR     ORTHOPEDIC SURGERY       PICC DOUBLE LUMEN PLACEMENT Right 02/07/2020    5 fr double lumen 41 cm     REMOVE HARDWARE LOWER EXTREMITY Left 4/7/2015    Procedure: REMOVE HARDWARE LOWER EXTREMITY;  Surgeon: Arnol Santiago MD;  Location: [facility] OR     REMOVE HARDWARE RODDING INTRAMEDULLARY FEMUR Left 12/17/2015    Procedure: REMOVE HARDWARE RODDING INTRAMEDULLARY FEMUR;  Surgeon: Arnol Santiago MD;  [facility]     RESECT BONE LOWER EXTREMITY Left 12/23/2014    Procedure: RESECT BONE LOWER EXTREMITY;  Surgeon: Arnol Santiago MD;  Location: [facility] OR     WHIPPLE PROCEDURE N/A 1/28/2020    Procedure: Open Whipple procedure and Cholecystectomy;  Surgeon: Yandel Mallory MD;  Location: [facility] OR     WHIPPLE PROCEDURE         Review of Systems:   ROS: 10 point ROS neg other than the symptoms noted above in the HPI and pertinents here:  Palliative Symptom Review (0=no symptom/no concern, 1=mild, 2=moderate, 3=severe):      Pain: 1-2      Fatigue: 1      Nausea: 1-2      Constipation: 0      Diarrhea: 0      Depressive Symptoms: 0      Anxiety: 1      Drowsiness: 0      Poor Appetite: 0      Shortness of Breath: 0      Insomnia: 0      Other: neuropathy 1-2      Overall (0 good/no concerns, 3 very poor):  1-2           Physical Exam:   Nathalie was seen today by video visit.   Resting comfortably in her home at time of evaluation with normal appearance, in no acute distress. Non-labored breathing.  No noted focal neurologic deficits.  Pleasant, alert, interactive with normal mood and affect.  Responded to all questions appropriately.     Key Data Reviewed:  LABS: 12/7/20: Cr 0.73, GFR >90, Albumin 3.3, Alk phos 244, AST 53, INR 1.21  IMAGING: Upper GI XR - 12/24/20: s/p surgical changes of whipple and subtotal gastrectomy with grant-en-y reconstruction, LUQ GJ stent with prompt filling of the gastric pouch and passed contrast through the GJ stent into the grant limb.  Normal passage of contrast through loops of small bowel thereafter.       Impression & Recommendations & Counseling:  Nathalie Bashir is a 55 year old female with a history of gastric adenocarcinoma found during Whipple in 1/2020 for presumed IPMN, no pancreatic malignancy found at that time now s/p neoadjuvant chemotherapy and surgery with curative intent on 10/21/20 with recent upper GI endoscopy with stenting of the jejunum due to some mild stenosis following her surgeries completed on 12/7/20. She was evaluated today primarily for continued abdominal discomfort as well as peripheral neuropathy. She was previously thought to have some ongoing abdominal spasm, but after further discussing her ongoing symptoms, she may be having difficulty with continued slow emptying and/or partial intermittent obstruction.      Plan:    For abdominal discomfort -   1. Will discontinue baclofen prescription which had not yet been started.   2. Will start compazine 10 mg q6 hours PRN. Discussed that she can continue to use Zofran if needed, but encouraged her to trial compazine first.     For neuropathy -   1. Increase amitriptyline to 100 mg daily at bedtime. This may also be modestly helpful for ongoing abdominal pain.   2. Will continue oxycodone at 5 mg TID PRN.   3. If she has continued difficulty with lower extremity cramping in  addition to neuropathy, could consider addition of muscle relaxant such as tizanidine.     She will follow-up in 2 months, or sooner if needed.     Al Marie DO  Palliative Medicine Fellow  Staffed with Dr. Scott    Attending attestation:   Patient seen and evaluated with Dr. Marie and I agree with findings and recs in this note.  Pain worsened after stent - I thought this might be stent-related spasm, but hearing a better description, this sounds more like brief obstructions causing fullness and her to feel like vomiting, so I don't think an antispasmodic will help.  Has follow-up with Dr. Aviles about possibly removing stent.   Thank you for involving us in the patient's care.   Precious Scott MD / Palliative Medicine / Pager [phone] / After-Hours Answering Service [phone] / Main Palliative Clinic - [facility] [phone] / [facility] Inpatient Team Consult Pager [phone] (answered 8am-430pm M-F)      Again, thank you for allowing me to participate in the care of your patient.      Sincerely,    Al Marie MD

## 2021-01-07 NOTE — PROGRESS NOTES
Nathalie is a 55 year old who is being evaluated via a billable video visit.      How would you like to obtain your AVS? MyChart  If the video visit is dropped, the invitation should be resent by: Text to cell phone: [phone]  Will anyone else be joining your video visit? No       STEPHENIE Sanches    Video-Visit Details    Type of service:  Video Visit    Video Start Time: 4:04 PM    Video End Time:4:48 PM    Originating Location (pt. Location): Home    Distant Location (provider location):  [facility]     Platform used for Video Visit: [facility]     Palliative Care Outpatient Clinic Progress Note    Patient Name:  Nathalie Bashir  Primary Provider:  Marianne Gonzalez    Chief Complaint: follow-up abdominal pain, fullness, neuropathic pain    Interim History:  Nathalie Bashir is a 55 year old female returns to be seen by palliative care today. She has a history significant for gastric adenocarcinoma found during Whipple in 1/2020 for presumed IPMN, no pancreatic malignancy found at that time. S/p neoadjuvant chemotherapy and surgery with curative intent on 10/21/20. Surgery was extensive and included partial omentectomy, resection of GJ anastomosis with en bloc subtotal gastrectomy, modified lymphadenectomy, and David-en-Y Gastrojejunostomy Reconstruction. She has since also undergone an upper GI endoscopy with stenting of the jejunum due to some mild stenosis following her surgeries noted above.     She reports that she has overall been doing okay, sharing that she has some good days and some bad days.  She continues to have some ongoing difficulty with abdominal pain, fullness, nausea, and vomiting. There was some concern that she was having difficulty with abdominal spasm, so a trial of baclofen was ordered, but has not yet been initiated due to insurance coverage. She describes the abdominal pain/discomfort that she is having like a constant pressure sensation.  She notes that this can  "be worsened by eating - but does not always worsen after eating.  She is tolerating the majority of a regular diet with the exception of steak & prime rib. She often continues to have significant nausea symptoms, and sometimes feels that the pressure can only be relieved by vomiting.  She has been taking Zofran consistently 1-4 times per day with modest improvement in symptoms. She has also been taking pantoprazole, famotidine, and carafate with some improvement in her symptoms.     She also continues to have difficulty with neuropathy pain.  She has neuropathy in her hands and feet. She shares that by the end of the day her hands are uncomfortable and \"cramped up\". She also describes a sensation of numbness, and a burning pain sensation in her feet.  She feels that her feet have also been more swollen and she has had some difficulty walking.  She has had two recent falls in her home, with one fall resulting in an injury to her toe. She also describes some muscle cramping in her legs and wonders whether a muscle relaxer may be helpful for this. She has been taking oxycodone 5 mg TID and ibuprofen 600 mg daily.     Coping:  She feels that she is overall coping well in terms of her cancer diagnosis now s/p extensive surgery and treatment.  She has some mild difficulty coping with various sources of ongoing pain.     Social History:  Pertinent changes to social history/social situation since last visit: None  Key support resources:  Ti  Advance Directive Status:  Not on file  Social History     Tobacco Use     Smoking status: Former Smoker     Packs/day: 0.50     Years: 25.00     Pack years: 12.50     Types: Cigarettes     Quit date: 2020     Years since quittin.0     Smokeless tobacco: Never Used   Substance Use Topics     Alcohol use: No     Drug use: No         Allergies   Allergen Reactions     Gluten Meal Palpitations     Augmentin Rash     Patient tolerated Zosyn in 2020     Oxycontin " [Oxycodone]      Confusion, loopy, oxycodone is tolerated     Pregabalin      interfered with Cymbalta     Topiramate      Tongue numbness, taste alteration, irritable      Acetaminophen Nausea     Urine retention       [place] Extract      Gabapentin Dizziness and GI Disturbance     Ketorolac Headache     Latex Rash     Methadone Fatigue     Mold      Naprosyn [Naproxen] Dizziness and GI Disturbance     Seasonal Allergies      Current Outpatient Medications   Medication Sig Dispense Refill     amitriptyline (ELAVIL) 100 MG tablet Take 1 tablet (100 mg) by mouth At Bedtime 30 tablet 3     amylase-lipase-protease (CREON 24) 40906-71450 units CPEP per EC capsule 2 capsules with meals and 1 capsule with snacks 240 capsule 4     citalopram (CELEXA) 20 MG tablet Take 20 mg by mouth every morning        famotidine (PEPCID) 20 MG tablet Take 20 mg by mouth 2 times daily as needed        ibuprofen (ADVIL/MOTRIN) 600 MG tablet Take 1 tablet (600 mg) by mouth every 6 hours as needed for moderate pain 50 tablet 0     loperamide (IMODIUM) 2 MG capsule Take 4 mg by mouth 4 times daily as needed for diarrhea        loratadine-pseudoePHEDrine (CLARITIN-D 24-HOUR)  MG 24 hr tablet Take 1 tablet by mouth daily as needed        magnesium 250 MG tablet Take 1 tablet by mouth daily       metoclopramide (REGLAN) 5 MG tablet Take 5 mg by mouth 2 times daily as needed       ondansetron (ZOFRAN) 8 MG tablet Take 1 tablet (8 mg) by mouth every 8 hours as needed (Nausea/Vomiting) 90 tablet 2     oxyCODONE (ROXICODONE) 5 MG tablet Take 1 tablet (5 mg) by mouth 3 times daily as needed for moderate to severe pain Maximum 3 pills/day. 42 tablet 0     pantoprazole (PROTONIX) 40 MG EC tablet Take 1 tablet (40 mg) by mouth 2 times daily 60 tablet 11     potassium chloride ER (KLOR-CON M) 20 MEQ CR tablet TAKE ONE TABLET BY MOUTH ONCE DAILY (Patient taking differently: daily as needed ) 30 tablet 1     prochlorperazine (COMPAZINE) 10 MG  tablet Take 1 tablet (10 mg) by mouth every 6 hours as needed for nausea or vomiting 60 tablet 3     simethicone 80 MG TABS Take 1 tablet by mouth every 6 hours as needed (bloating, gas, belching) 90 tablet 3     sucralfate (CARAFATE) 1 GM tablet Take 1 tablet (1 g) by mouth 4 times daily as needed (reflux.) 360 tablet 6     SUMAtriptan (IMITREX) 100 MG tablet Take 100 mg by mouth at onset of headache for migraine       Past Medical History:   Diagnosis Date     Allergic rhinitis      Cancer (H)     stomach cancer     Chronic pain      Depression      Lumbago      Migraine      Opioid dependence (H)      Other chronic pain     low back     Sacroiliitis (H)     low back pain     Trochanteric bursitis     leg length discrrepancy, hip pain     Past Surgical History:   Procedure Laterality Date     ARTHROPLASTY HIP Left 2016     BACK SURGERY      L4-5 decompression     C REPAIR DETACH RETINA,SCLERAL BUCKLE       CATARACT IOL, RT/LT       CHOLECYSTECTOMY N/A 1/28/2020    Procedure: Cholecystectomy;  Surgeon: Yandel Mallory MD;  Location:  OR     ENDOSCOPIC RETROGRADE CHOLANGIOPANCREATOGRAM N/A 7/27/2020    Procedure: ENDOSCOPIC RETROGRADE CHOLANGIOPANCREATOGRAPHY  **Latex Allergy** with jejunal biopsies;  Surgeon: Jeet Aviles MD;  Location:  OR     ESOPHAGOSCOPY, GASTROSCOPY, DUODENOSCOPY (EGD), COMBINED N/A 3/3/2020    Procedure: ESOPHAGOGASTRODUODENOSCOPY, WITH ENDOSCOPIC US (enoxaparin);  Surgeon: Bakari Plata MD;  Location:  GI     ESOPHAGOSCOPY, GASTROSCOPY, DUODENOSCOPY (EGD), COMBINED N/A 12/7/2020    Procedure: Upper Endoscopy with stent placement;  Surgeon: Jeet Aviles MD;  Location:  OR     EYE SURGERY       GASTRECTOMY N/A 10/21/2020    Procedure: Open subtotal gastectomy with David en Y Reconstruction; D1 Lymph Node Disection  **Latex Allergy**;  Surgeon: Yandel Mallory MD;  Location:  OR     IR CHEST PORT PLACEMENT > 5 YRS OF AGE  3/26/2020     LAPAROSCOPY DIAGNOSTIC  (GENERAL) N/A 10/13/2020    Procedure: Diagnostic laparoscopy with peritoneal washings  **Latex Allergy**;  Surgeon: Yandel Mallory MD;  [facility]: [facility]     OPEN REDUCTION INTERNAL FIXATION RODDING INTRAMEDULLARY FEMUR Left 12/23/2014    Procedure: OPEN REDUCTION INTERNAL FIXATION RODDING INTRAMEDULLARY FEMUR;  Surgeon: Arnol Santiago MD;  Location: [facility] OR     ORTHOPEDIC SURGERY       PICC DOUBLE LUMEN PLACEMENT Right 02/07/2020    5 fr double lumen 41 cm     REMOVE HARDWARE LOWER EXTREMITY Left 4/7/2015    Procedure: REMOVE HARDWARE LOWER EXTREMITY;  Surgeon: Arnol Santiago MD;  [facility]: [facility] OR     REMOVE HARDWARE RODDING INTRAMEDULLARY FEMUR Left 12/17/2015    Procedure: REMOVE HARDWARE RODDING INTRAMEDULLARY FEMUR;  Surgeon: Arnol Santiago MD;  [facility]     RESECT BONE LOWER EXTREMITY Left 12/23/2014    Procedure: RESECT BONE LOWER EXTREMITY;  Surgeon: Arnol Santiago MD;  [facility] OR     WHIPPLE PROCEDURE N/A 1/28/2020    Procedure: Open Whipple procedure and Cholecystectomy;  Surgeon: Yandel Mallory MD;  Location: [facility] OR     WHIPPLE PROCEDURE         Review of Systems:   ROS: 10 point ROS neg other than the symptoms noted above in the HPI and pertinents here:  Palliative Symptom Review (0=no symptom/no concern, 1=mild, 2=moderate, 3=severe):      Pain: 1-2      Fatigue: 1      Nausea: 1-2      Constipation: 0      Diarrhea: 0      Depressive Symptoms: 0      Anxiety: 1      Drowsiness: 0      Poor Appetite: 0      Shortness of Breath: 0      Insomnia: 0      Other: neuropathy 1-2      Overall (0 good/no concerns, 3 very poor):  1-2           Physical Exam:   Nathalie was seen today by video visit.  Resting comfortably in her home at time of evaluation with normal appearance, in no acute distress. Non-labored breathing.  No noted focal neurologic deficits.  Pleasant, alert, interactive with normal mood and affect.  Responded to all questions appropriately.     Key Data Reviewed:  LABS:  12/7/20: Cr 0.73, GFR >90, Albumin 3.3, Alk phos 244, AST 53, INR 1.21  IMAGING: Upper GI XR - 12/24/20: s/p surgical changes of whipple and subtotal gastrectomy with grant-en-y reconstruction, LUQ GJ stent with prompt filling of the gastric pouch and passed contrast through the GJ stent into the grant limb.  Normal passage of contrast through loops of small bowel thereafter.       Impression & Recommendations & Counseling:  Nathalie Bashir is a 55 year old female with a history of gastric adenocarcinoma found during Whipple in 1/2020 for presumed IPMN, no pancreatic malignancy found at that time now s/p neoadjuvant chemotherapy and surgery with curative intent on 10/21/20 with recent upper GI endoscopy with stenting of the jejunum due to some mild stenosis following her surgeries completed on 12/7/20. She was evaluated today primarily for continued abdominal discomfort as well as peripheral neuropathy. She was previously thought to have some ongoing abdominal spasm, but after further discussing her ongoing symptoms, she may be having difficulty with continued slow emptying and/or partial intermittent obstruction.      Plan:    For abdominal discomfort -   1. Will discontinue baclofen prescription which had not yet been started.   2. Will start compazine 10 mg q6 hours PRN. Discussed that she can continue to use Zofran if needed, but encouraged her to trial compazine first.     For neuropathy -   1. Increase amitriptyline to 100 mg daily at bedtime. This may also be modestly helpful for ongoing abdominal pain.   2. Will continue oxycodone at 5 mg TID PRN.   3. If she has continued difficulty with lower extremity cramping in addition to neuropathy, could consider addition of muscle relaxant such as tizanidine.     She will follow-up in 2 months, or sooner if needed.     Al Marie DO  Palliative Medicine Fellow  Staffed with Dr. Scott    Attending attestation:   Patient seen and evaluated with Dr. Marie and I agree  with findings and recs in this note.  Pain worsened after stent - I thought this might be stent-related spasm, but hearing a better description, this sounds more like brief obstructions causing fullness and her to feel like vomiting, so I don't think an antispasmodic will help.  Has follow-up with Dr. Aviles about possibly removing stent.   Thank you for involving us in the patient's care.   Precoius Scott MD / Palliative Medicine / Pager [phone] / After-Hours Answering Service [phone] / Main Palliative Clinic - [facility] [phone] / [facility] Inpatient Team Consult Pager [phone] (answered 8am-430pm M-F)

## 2021-01-08 PROBLEM — Z98.890 HISTORY OF GASTRIC SURGERY: Status: ACTIVE | Noted: 2021-01-08

## 2021-01-12 ENCOUNTER — TELEPHONE (OUTPATIENT)
Dept: GASTROENTEROLOGY | Facility: CLINIC | Age: 56
End: 2021-01-12

## 2021-01-12 NOTE — TELEPHONE ENCOUNTER
Spoke to patient in regards to scheduled procedure. Informed patient she is scheduled with Dr. Aviles on 2/8/2021 at [facility]. Informed patient she will need an updated pre-op physical within 30 days of her procedure and a COVID-19 test within 96-72 hours of procedure date. Patient stated she is going to have this done locally. Informed patient she will need a  and someone to monitor her for 24 hours after the procedure. Informed patient all scheduling details will be sent to her MyChart per her request.

## 2021-01-13 DIAGNOSIS — Z11.59 ENCOUNTER FOR SCREENING FOR OTHER VIRAL DISEASES: Primary | ICD-10-CM

## 2021-01-15 PROCEDURE — 250N000011 HC RX IP 250 OP 636: Performed by: INTERNAL MEDICINE

## 2021-01-15 PROCEDURE — 96523 IRRIG DRUG DELIVERY DEVICE: CPT

## 2021-01-15 RX ORDER — HEPARIN SODIUM (PORCINE) LOCK FLUSH IV SOLN 100 UNIT/ML 100 UNIT/ML
5 SOLUTION INTRAVENOUS ONCE
Status: COMPLETED | OUTPATIENT
Start: 2021-01-15 | End: 2021-01-15

## 2021-01-15 RX ADMIN — Medication 5 ML: at 12:44

## 2021-01-15 NOTE — NURSING NOTE
Chief Complaint   Patient presents with     Lab Only     Port flushed by RN in lab.      Port accessed with 20 gauge gripper needle by RN in lab.  Port flushed with saline and heparin.      Starla Steward RN

## 2021-01-18 ENCOUNTER — MYC REFILL (OUTPATIENT)
Dept: PALLIATIVE CARE | Facility: CLINIC | Age: 56
End: 2021-01-18

## 2021-01-18 DIAGNOSIS — G89.18 POSTOPERATIVE PAIN: ICD-10-CM

## 2021-01-18 NOTE — TELEPHONE ENCOUNTER
In addition to mychart refill of oxycodone, received VM from patient asking for call back to talk about neuropathy.     Called her back - neuropathy is still really bothersome for her. Feet are worse then hands. She notes about 2 weeks ago she had a fall in the shower because feet were numb/painful. [She reports she did go get checked out following call, got xrays, no fractures. Had some bruising. Did not hit head.]    Notes the neuropathy is really uncomfortable - pins and needs/numb/pain constantly, worse as the day goes on despite trying to be active/do exercise.     She wonders if there is anything else Dr. Scott might suggest?    Also needs oxycodone refill Wednesday, just giving us a few days notice.     Advised I would review with Dr. Scott and follow up with her, she understands Dr. Scott is not in today, but will be back tomorrow/rest of the week.     Last refill oxycodone: 1/6/2021  Last office visit: 1/7/2021  Message sent to  for follow up    MN  Report reviewed.

## 2021-01-20 ENCOUNTER — PATIENT OUTREACH (OUTPATIENT)
Dept: GASTROENTEROLOGY | Facility: CLINIC | Age: 56
End: 2021-01-20

## 2021-01-20 RX ORDER — OXYCODONE HYDROCHLORIDE 5 MG/1
5 TABLET ORAL 3 TIMES DAILY PRN
Qty: 42 TABLET | Refills: 0 | Status: SHIPPED | OUTPATIENT
Start: 2021-01-20 | End: 2021-02-01

## 2021-01-20 NOTE — TELEPHONE ENCOUNTER
Spoke with patient - advised Dr. Scott's partner recommended neuropathy work up through PCP. She sees PCP Monday for pre op work up so will ask then.

## 2021-01-21 ENCOUNTER — VIRTUAL VISIT (OUTPATIENT)
Dept: ONCOLOGY | Facility: CLINIC | Age: 56
End: 2021-01-21
Attending: INTERNAL MEDICINE
Payer: MEDICARE

## 2021-01-21 DIAGNOSIS — D50.9 IRON DEFICIENCY ANEMIA, UNSPECIFIED IRON DEFICIENCY ANEMIA TYPE: ICD-10-CM

## 2021-01-21 DIAGNOSIS — C16.8 MALIGNANT NEOPLASM OF OVERLAPPING SITES OF STOMACH (H): Primary | ICD-10-CM

## 2021-01-21 PROCEDURE — 99215 OFFICE O/P EST HI 40 MIN: CPT | Mod: 95 | Performed by: INTERNAL MEDICINE

## 2021-01-21 PROCEDURE — 999N001193 HC VIDEO/TELEPHONE VISIT; NO CHARGE

## 2021-01-21 NOTE — LETTER
"    1/21/2021         RE: Nathalie Bashir  [address] [facility] [address]        Dear Colleague,    Thank you for referring your patient, Nathalie Bashir, to the [facility]. Please see a copy of my visit note below.    Nathalie is a 55 year old who is being evaluated via a billable video visit.      How would you like to obtain your AVS? MyChart  If the video visit is dropped, the invitation should be resent by: Text to cell phone: [phone]  Will anyone else be joining your video visit? No       Vitals - Patient Reported  Weight (Patient Reported): 64.4 kg (142 lb)  Height (Patient Reported): 172.7 cm (5' 8\")  BMI (Based on Pt Reported Ht/Wt): 21.59  Pain Score: Mild Pain (3)  Pain Loc: Abdomen      I have reviewed and updated patient's allergy and medication list.    Concerns: NONE  Refills: NONE      Gillian Gonzalez CMA      Video Start Time: 10:08 AM  Video-Visit Details    Type of service:  Video Visit    Video End Time:10:20 AM    Originating Location (pt. Location): Home    Distant Location (provider location):  [facility]     Platform used for Video Visit: Doximity      Oncology/Hematology Visit Note  Jan 21, 2021    Reason for Visit: Follow up of gastric adenocarcinoma.    History of Present Illness:     Please see previous notes for detail. I have copied and updated from prior notes.    Ms. Bashir is a 55 year old female with a history of gastric adenocarcinoma.  She was being followed for a pancreatic duct dilatation and pancreatic cyst which was noted in November 2018.  Since May 2019 she started noticing some nausea and vomiting and occasional abdominal discomfort.  She had an ultrasound in August 2019 which was essentially unremarkable.  She had a repeat endoscopic ultrasound on 10/29/2019 which showed increase in size of the cyst as well as dilation of the main pancreatic duct.  FNA and fluid analysis showed mucinous epithelium.  She " was referred to Dr. 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  Surprisingly there was no pancreatic ductal neoplasm seen but on the resected specimen stomach cancer was noted.  It was poorly differentiated adenocarcinoma with poorly cohesive/signet ring cell type with proximal gastric mucosal and serosal margins being positive.  There was lymphovascular and perineural invasion seen.  22 lymph nodes were sampled and all were benign.  It was a pT4aN0 lesion.  HER-2/christine FISH was not amplified.         EUS on 3/3/2020 without clear evidence of neoplasm endoscopically. Left gastric lymph node fine-needle aspiration was negative for carcinoma. Biopsy from the gastric jejunal anastomosis as well as lesser curvature of the stomach also did not show any malignancy.     She had a PET/CT on 3/13/2020 which showed soft tissue streaky density with increased FDG uptake adjacent to the pancreaticojejunostomy which could be neoplastic in origin.  There is mild increased FDG uptake in the gastric remnant.  Focal area of soft tissue thickening with fatty streakiness along medial laparotomy with increased FDG uptake which likely is inflammatory. Increased FDG uptake in thoracic esophagus which could be esophagitis.      She started on chemotherapy with 5FU, oxaliplatin, and Taxotere on 3/27/20. She was seen on 4/7/20 for evaluation of fevers. She was treated for possible pneumonia with Levaquin.      She was seen in clinic 4/13/20 for RUQ pain,CT and labs reassuring. She received cycles 2 and 3 of chemotherapy on 4/20/20 and 5/4/20. She was admitted from 5/21-5/23/20 with neutropenic aspiration pneumonia. She received cycle 4 on 5/27/20 with Neulasta support. PET/CT on 6/3/20 showed mild residual uptake at the site of gastrojejunal anastomosis which likely is physiologic.  There is almost resolution of soft tissue nodule next to the pancreaticoduodenal anastomosis without FDG uptake.  There is  focal increased uptake in the left posterior acetabulum which could be artifact.     Decreased dose of oxaliplatin due to neuropathy/cold sensitivity on 6/11. Decreased 5FU to 85% with cycle #6 due to mucositis/diarrhea/taste changes. Treatment was held 7/9/20 due to diarrhea, dehydration, tachycardia, rash, and weight loss and she got cycle 7 on 7/15. On 7/20 she presented to the ED with RUQ abdominal pain, and was admitted 7/20-7/29 for acute cholangitis     She received C#8 FLOT on 8/14/2020.    PET/CT on 9/2/2020 did not show evidence of disease.  Small peritoneal nodule was a stable.  This was most consistent with fat necrosis.    CT chest abdomen and pelvis on 9/23/2020 also does not show evidence of malignancy recurrence and the peritoneal nodule in the right upper quadrant is stable.  Before definite surgery she had diagnostic laparoscopy which was negative for evidence of malignancy so on 10/21/2020 she had exploratory laparotomy with extensive lysis of additions, partial omentectomy, resection of Gastrojejunal Anastomosis w/ En Bloc Subtotal Gastrectomy and modified D2 Lymphadenectomy with David-en-Y Gastrojejunostomy Reconstruction.  Final pathology showed Focal residual poorly differentiated adenocarcinoma (0.4cm) with signet-ring morphology status    post-neoadjuvant chemotherapy and all 9 lymph nodes were benign.  All margins were negative.  It was a pT1bN0 lesion.    Before definite surgery she had diagnostic laparoscopy which was negative for evidence of malignancy so on 10/21/2020 she had exploratory laparotomy with extensive lysis of additions, partial omentectomy, resection of Gastrojejunal Anastomosis w/ En Bloc Subtotal Gastrectomy and modified D2 Lymphadenectomy with David-en-Y Gastrojejunostomy Reconstruction.  Final pathology showed Focal residual poorly differentiated adenocarcinoma (0.4cm) with signet-ring morphology status    post-neoadjuvant chemotherapy and all 9 lymph nodes were benign.   All margins were negative.  It was a [id number] lesion.    She was admitted from 11/4/2020-11/6/2020 for nausea vomiting and left upper quadrant pain and CT scan showed fluid collection in the left upper quadrant, likely hematoma.  Upper GI was negative for leak.  No drainage was recommended by IR.  He was discharged on 7 days of levofloxacin and Flagyl.      11/20/2020.  CT abdomen and pelvis showed postsurgical changes of Whipple and subtotal gastrectomy with David-en-Y reconstruction.  There is resolution of previously demonstrated fluid collection between the stomach and the spleen and decrease in sigmoid thickening.  Small left pleural effusion with atelectasis adjacent to it.    12/7/2020.  EGD showed healthy-appearing and generous pouch (remaining the stomach) with widely patent end to side gastrojejunostomy, though with the David/enteral limb mildly stenostic and tortuous at its origin. Therefore a covered metal stent was placed from stomach to jejunum across the stenotic region, resulting in straightening of the course.      Interval History:  -this is a video visit through [employer].  She tells me that she is doing better now.  The pain in the left upper quadrant/left rib cage area is also better and she is taking oxycodone 3 a day.  She thinks that the stent placement has helped her.  She is now on Reglan and Zofran for nausea and occasionally she feels nauseous.  Bowel movements are fine.  She is not taking Creon because it made her bloated.  No fevers or infections.  Slowly energy is improving but she still feels tired.  She thinks the neuropathy is better in the hands but she has tingling and numbness in the feet.  She takes amitriptyline for it at night which helps.  Previously she had tried acupuncture but it caused problems with her nerves so she stopped doing it.      ECOG 1-2    ROS:  A comprehensive ROS was otherwise neg      I reviewed other history in epic as below.      Current Outpatient  Medications   Medication Sig Dispense Refill     amitriptyline (ELAVIL) 100 MG tablet Take 1 tablet (100 mg) by mouth At Bedtime 30 tablet 3     amylase-lipase-protease (CREON 24) 45793-19186 units CPEP per EC capsule 2 capsules with meals and 1 capsule with snacks 240 capsule 4     citalopram (CELEXA) 20 MG tablet Take 20 mg by mouth every morning        famotidine (PEPCID) 20 MG tablet Take 20 mg by mouth 2 times daily as needed        ibuprofen (ADVIL/MOTRIN) 600 MG tablet Take 1 tablet (600 mg) by mouth every 6 hours as needed for moderate pain 50 tablet 0     loperamide (IMODIUM) 2 MG capsule Take 4 mg by mouth 4 times daily as needed for diarrhea        loratadine-pseudoePHEDrine (CLARITIN-D 24-HOUR)  MG 24 hr tablet Take 1 tablet by mouth daily as needed        magnesium 250 MG tablet Take 1 tablet by mouth daily       metoclopramide (REGLAN) 5 MG tablet Take 5 mg by mouth 2 times daily as needed       ondansetron (ZOFRAN) 8 MG tablet Take 1 tablet (8 mg) by mouth every 8 hours as needed (Nausea/Vomiting) 90 tablet 2     oxyCODONE (ROXICODONE) 5 MG tablet Take 1 tablet (5 mg) by mouth 3 times daily as needed for moderate to severe pain Maximum 3 pills/day. 42 tablet 0     pantoprazole (PROTONIX) 40 MG EC tablet Take 1 tablet (40 mg) by mouth 2 times daily 60 tablet 11     potassium chloride ER (KLOR-CON M) 20 MEQ CR tablet TAKE ONE TABLET BY MOUTH ONCE DAILY (Patient taking differently: daily as needed ) 30 tablet 1     prochlorperazine (COMPAZINE) 10 MG tablet Take 1 tablet (10 mg) by mouth every 6 hours as needed for nausea or vomiting 60 tablet 3     simethicone 80 MG TABS Take 1 tablet by mouth every 6 hours as needed (bloating, gas, belching) 90 tablet 3     sucralfate (CARAFATE) 1 GM tablet Take 1 tablet (1 g) by mouth 4 times daily as needed (reflux.) 360 tablet 6     SUMAtriptan (IMITREX) 100 MG tablet Take 100 mg by mouth at onset of headache for migraine         Physical Examination:    LMP  09/23/2014   Wt Readings from Last 10 Encounters:   12/07/20 64.6 kg (142 lb 8 oz)   11/20/20 70.8 kg (156 lb 1.6 oz)   11/04/20 72.6 kg (160 lb)   10/25/20 74 kg (163 lb 3.2 oz)   10/16/20 75.6 kg (166 lb 9.6 oz)   10/13/20 74.6 kg (164 lb 7.4 oz)   09/25/20 78 kg (172 lb)   09/25/20 77.8 kg (171 lb 9.6 oz)   09/04/20 76.1 kg (167 lb 11.2 oz)   08/21/20 75.9 kg (167 lb 6.4 oz)         Constitutional.  Does not seem to be in any acute distress.  Eyes.  No redness or discharge noted.  Respiratory.  Speaking in full sentences.  Breathing seems comfortable without any accessory use of muscles.    Skin.  Visualized his skin does not show any obvious rashes.  Musculoskeletal.  Range of motion for visualized areas is intact.  Neurological.  Alert and oriented x3.  Psychiatric.  Mood, mentation and affect are normal.  Decision making capacity is intact.      The rest of a comprehensive physical examination is deferred due to Public Health Emergency video visit restrictions.      Laboratory Data:     Reviewed  12/7/2020.  CBC was unremarkable.  CMP shows improvement in albumin to 3.3.  Alkaline phosphatase 244.  AST 53.  Otherwise it was unremarkable.      11/20/2020.  CT abdomen and pelvis showed postsurgical changes of Whipple and subtotal gastrectomy with David-en-Y reconstruction.  There is resolution of previously demonstrated fluid collection between the stomach and the spleen and decrease in sigmoid thickening.  Small left pleural effusion with atelectasis adjacent to it.    12/7/2020.  EGD showed healthy-appearing and generous pouch (remaining the stomach) with widely patent end to side gastrojejunostomy, though with the David/enteral limb mildly stenostic and tortuous at its origin. Therefore a covered metal stent was placed from stomach to jejunum across the stenotic region, resulting in straightening of the course.    Assessment and Plan:    Gastric adenocarcinona, HER-2 negative.   - Started on neoadjuvant FLOT on  3/7/2020. She has required a dose reduction of her oxaliplatin by 30% due to cold sensitivity/neuropathy with cycle #5. 5FU was decreased to 85% with cycle #6 due to mucositis/diarrhea/taste changes.   Received C#8 on 8/14/2020.  PET/CT on 9/2/2020 did not show evidence of disease.  Small peritoneal nodule was a stable.  This was most consistent with fat necrosis.    CT chest abdomen and pelvis on 9/23/2020 also does not show evidence of malignancy recurrence and the peritoneal nodule in the right upper quadrant is stable.    Before definite surgery she had diagnostic laparoscopy which was negative for evidence of malignancy so on 10/21/2020 she had exploratory laparotomy with extensive lysis of additions, partial omentectomy, resection of Gastrojejunal Anastomosis w/ En Bloc Subtotal Gastrectomy and modified D2 Lymphadenectomy with David-en-Y Gastrojejunostomy Reconstruction.  Final pathology showed Focal residual poorly differentiated adenocarcinoma (0.4cm) with signet-ring morphology status    post-neoadjuvant chemotherapy and all 9 lymph nodes were benign.  All margins were negative.  It was a juF2qH2 lesion.    We decided to keep her on observation as she had received 6 months of neoadjuvant chemotherapy and then the definite surgery.  Plan to repeat his scans every 6 months and do EGD as needed.      CT abdomen and pelvis on 11/20/2020, did not show evidence of recurrence.    I would recommend repeating CT scan every 6 months which she will be due in May 2021.  I also recommend follow-up in cancer survivorship program and I gave him a referral for that.      Abdominal pain.  This is slowly improved.  The hematoma has almost resolved.  EGD done on 12/7/2020 did not show evidence of recurrence. As David/enteral limb was mildly stenotic and tortuous at its origin, therefore a covered metal stent was placed from stomach to jejunum across the stenotic region, resulting in straightening of the course.  She thinks that  this has helped.  Currently she is on oxycodone and she takes it 3 times a day.  Continue to follow with palliative care.    Anemia.  She had iron deficiency anemia and she received INFeD on 10/16/2020.  On 12/7/2020, hemoglobin was normal.  Continue to observe.  We will repeat iron studies in a couple of months.    Neuropathy.  She thinks that the neuropathy has improved in the fingers but she still has tingling and numbness of her feet.  She tried acupuncture once but it upset her nerves so she stopped that.  I recommend that she can also look into laser therapy as some of my patients have benefited from laser therapy who had chemotherapy-induced neuropathy.  She currently is on amitriptyline at night which helps.  She will continue to follow with palliative care.        Port-A-Cath.  Continue port flushes every 8 weeks.      Return to clinic in 2 months to follow in cancer survivorship clinic.  We will repeat labs in.  In 4 months she will have repeat labs and CT scan she will follow up with me.    All questions answered and she is agreeable with the plan.    Magaly Pollard MD

## 2021-01-21 NOTE — PROGRESS NOTES
"Nathalie is a 55 year old who is being evaluated via a billable video visit.      How would you like to obtain your AVS? MyChart  If the video visit is dropped, the invitation should be resent by: Text to cell phone: [phone]  Will anyone else be joining your video visit? No       Vitals - Patient Reported  Weight (Patient Reported): 64.4 kg (142 lb)  Height (Patient Reported): 172.7 cm (5' 8\")  BMI (Based on Pt Reported Ht/Wt): 21.59  Pain Score: Mild Pain (3)  Pain Loc: Abdomen      I have reviewed and updated patient's allergy and medication list.    Concerns: NONE  Refills: NONE      Gillian Gonzalez CMA      Video Start Time: 10:08 AM  Video-Visit Details    Type of service:  Video Visit    Video End Time:10:20 AM    Originating Location (pt. Location): Home    Distant Location (provider location):  [facility]     Platform used for Video Visit: Padmini    "

## 2021-01-21 NOTE — PROGRESS NOTES
Oncology/Hematology Visit Note  Jan 21, 2021    Reason for Visit: Follow up of gastric adenocarcinoma.    History of Present Illness:     Please see previous notes for detail. I have copied and updated from prior notes.    Ms. Bashir is a 55 year old female with a history of gastric adenocarcinoma.  She was being followed for a pancreatic duct dilatation and pancreatic cyst which was noted in November 2018.  Since May 2019 she started noticing some nausea and vomiting and occasional abdominal discomfort.  She had an ultrasound in August 2019 which was essentially unremarkable.  She had a repeat endoscopic ultrasound on 10/29/2019 which showed increase in size of the cyst as well as dilation of the main pancreatic duct.  FNA and fluid analysis showed mucinous epithelium.  She was referred to Dr. 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  Surprisingly there was no pancreatic ductal neoplasm seen but on the resected specimen stomach cancer was noted.  It was poorly differentiated adenocarcinoma with poorly cohesive/signet ring cell type with proximal gastric mucosal and serosal margins being positive.  There was lymphovascular and perineural invasion seen.  22 lymph nodes were sampled and all were benign.  It was a pT4aN0 lesion.  HER-2/christine FISH was not amplified.         EUS on 3/3/2020 without clear evidence of neoplasm endoscopically. Left gastric lymph node fine-needle aspiration was negative for carcinoma. Biopsy from the gastric jejunal anastomosis as well as lesser curvature of the stomach also did not show any malignancy.     She had a PET/CT on 3/13/2020 which showed soft tissue streaky density with increased FDG uptake adjacent to the pancreaticojejunostomy which could be neoplastic in origin.  There is mild increased FDG uptake in the gastric remnant.  Focal area of soft tissue thickening with fatty streakiness along medial laparotomy with increased FDG uptake which  likely is inflammatory. Increased FDG uptake in thoracic esophagus which could be esophagitis.      She started on chemotherapy with 5FU, oxaliplatin, and Taxotere on 3/27/20. She was seen on 4/7/20 for evaluation of fevers. She was treated for possible pneumonia with Levaquin.      She was seen in clinic 4/13/20 for RUQ pain,CT and labs reassuring. She received cycles 2 and 3 of chemotherapy on 4/20/20 and 5/4/20. She was admitted from 5/21-5/23/20 with neutropenic aspiration pneumonia. She received cycle 4 on 5/27/20 with Neulasta support. PET/CT on 6/3/20 showed mild residual uptake at the site of gastrojejunal anastomosis which likely is physiologic.  There is almost resolution of soft tissue nodule next to the pancreaticoduodenal anastomosis without FDG uptake.  There is focal increased uptake in the left posterior acetabulum which could be artifact.     Decreased dose of oxaliplatin due to neuropathy/cold sensitivity on 6/11. Decreased 5FU to 85% with cycle #6 due to mucositis/diarrhea/taste changes. Treatment was held 7/9/20 due to diarrhea, dehydration, tachycardia, rash, and weight loss and she got cycle 7 on 7/15. On 7/20 she presented to the ED with RUQ abdominal pain, and was admitted 7/20-7/29 for acute cholangitis     She received C#8 FLOT on 8/14/2020.    PET/CT on 9/2/2020 did not show evidence of disease.  Small peritoneal nodule was a stable.  This was most consistent with fat necrosis.    CT chest abdomen and pelvis on 9/23/2020 also does not show evidence of malignancy recurrence and the peritoneal nodule in the right upper quadrant is stable.  Before definite surgery she had diagnostic laparoscopy which was negative for evidence of malignancy so on 10/21/2020 she had exploratory laparotomy with extensive lysis of additions, partial omentectomy, resection of Gastrojejunal Anastomosis w/ En Bloc Subtotal Gastrectomy and modified D2 Lymphadenectomy with David-en-Y Gastrojejunostomy Reconstruction.   Final pathology showed Focal residual poorly differentiated adenocarcinoma (0.4cm) with signet-ring morphology status    post-neoadjuvant chemotherapy and all 9 lymph nodes were benign.  All margins were negative.  It was a pT1bN0 lesion.    Before definite surgery she had diagnostic laparoscopy which was negative for evidence of malignancy so on 10/21/2020 she had exploratory laparotomy with extensive lysis of additions, partial omentectomy, resection of Gastrojejunal Anastomosis w/ En Bloc Subtotal Gastrectomy and modified D2 Lymphadenectomy with David-en-Y Gastrojejunostomy Reconstruction.  Final pathology showed Focal residual poorly differentiated adenocarcinoma (0.4cm) with signet-ring morphology status    post-neoadjuvant chemotherapy and all 9 lymph nodes were benign.  All margins were negative.  It was a toS3mC0 lesion.    She was admitted from 11/4/2020-11/6/2020 for nausea vomiting and left upper quadrant pain and CT scan showed fluid collection in the left upper quadrant, likely hematoma.  Upper GI was negative for leak.  No drainage was recommended by IR.  He was discharged on 7 days of levofloxacin and Flagyl.      11/20/2020.  CT abdomen and pelvis showed postsurgical changes of Whipple and subtotal gastrectomy with David-en-Y reconstruction.  There is resolution of previously demonstrated fluid collection between the stomach and the spleen and decrease in sigmoid thickening.  Small left pleural effusion with atelectasis adjacent to it.    12/7/2020.  EGD showed healthy-appearing and generous pouch (remaining the stomach) with widely patent end to side gastrojejunostomy, though with the David/enteral limb mildly stenostic and tortuous at its origin. Therefore a covered metal stent was placed from stomach to jejunum across the stenotic region, resulting in straightening of the course.      Interval History:  -this is a video visit through [employer].  She tells me that she is doing better now.  The pain in  the left upper quadrant/left rib cage area is also better and she is taking oxycodone 3 a day.  She thinks that the stent placement has helped her.  She is now on Reglan and Zofran for nausea and occasionally she feels nauseous.  Bowel movements are fine.  She is not taking Creon because it made her bloated.  No fevers or infections.  Slowly energy is improving but she still feels tired.  She thinks the neuropathy is better in the hands but she has tingling and numbness in the feet.  She takes amitriptyline for it at night which helps.  Previously she had tried acupuncture but it caused problems with her nerves so she stopped doing it.      ECOG 1-2    ROS:  A comprehensive ROS was otherwise neg      I reviewed other history in epic as below.      Current Outpatient Medications   Medication Sig Dispense Refill     amitriptyline (ELAVIL) 100 MG tablet Take 1 tablet (100 mg) by mouth At Bedtime 30 tablet 3     amylase-lipase-protease (CREON 24) 19310-30438 units CPEP per EC capsule 2 capsules with meals and 1 capsule with snacks 240 capsule 4     citalopram (CELEXA) 20 MG tablet Take 20 mg by mouth every morning        famotidine (PEPCID) 20 MG tablet Take 20 mg by mouth 2 times daily as needed        ibuprofen (ADVIL/MOTRIN) 600 MG tablet Take 1 tablet (600 mg) by mouth every 6 hours as needed for moderate pain 50 tablet 0     loperamide (IMODIUM) 2 MG capsule Take 4 mg by mouth 4 times daily as needed for diarrhea        loratadine-pseudoePHEDrine (CLARITIN-D 24-HOUR)  MG 24 hr tablet Take 1 tablet by mouth daily as needed        magnesium 250 MG tablet Take 1 tablet by mouth daily       metoclopramide (REGLAN) 5 MG tablet Take 5 mg by mouth 2 times daily as needed       ondansetron (ZOFRAN) 8 MG tablet Take 1 tablet (8 mg) by mouth every 8 hours as needed (Nausea/Vomiting) 90 tablet 2     oxyCODONE (ROXICODONE) 5 MG tablet Take 1 tablet (5 mg) by mouth 3 times daily as needed for moderate to severe pain  Maximum 3 pills/day. 42 tablet 0     pantoprazole (PROTONIX) 40 MG EC tablet Take 1 tablet (40 mg) by mouth 2 times daily 60 tablet 11     potassium chloride ER (KLOR-CON M) 20 MEQ CR tablet TAKE ONE TABLET BY MOUTH ONCE DAILY (Patient taking differently: daily as needed ) 30 tablet 1     prochlorperazine (COMPAZINE) 10 MG tablet Take 1 tablet (10 mg) by mouth every 6 hours as needed for nausea or vomiting 60 tablet 3     simethicone 80 MG TABS Take 1 tablet by mouth every 6 hours as needed (bloating, gas, belching) 90 tablet 3     sucralfate (CARAFATE) 1 GM tablet Take 1 tablet (1 g) by mouth 4 times daily as needed (reflux.) 360 tablet 6     SUMAtriptan (IMITREX) 100 MG tablet Take 100 mg by mouth at onset of headache for migraine         Physical Examination:    [facility] 09/23/2014   Wt Readings from Last 10 Encounters:   12/07/20 64.6 kg (142 lb 8 oz)   11/20/20 70.8 kg (156 lb 1.6 oz)   11/04/20 72.6 kg (160 lb)   10/25/20 74 kg (163 lb 3.2 oz)   10/16/20 75.6 kg (166 lb 9.6 oz)   10/13/20 74.6 kg (164 lb 7.4 oz)   09/25/20 78 kg (172 lb)   09/25/20 77.8 kg (171 lb 9.6 oz)   09/04/20 76.1 kg (167 lb 11.2 oz)   08/21/20 75.9 kg (167 lb 6.4 oz)         Constitutional.  Does not seem to be in any acute distress.  Eyes.  No redness or discharge noted.  Respiratory.  Speaking in full sentences.  Breathing seems comfortable without any accessory use of muscles.    Skin.  Visualized his skin does not show any obvious rashes.  Musculoskeletal.  Range of motion for visualized areas is intact.  Neurological.  Alert and oriented x3.  Psychiatric.  Mood, mentation and affect are normal.  Decision making capacity is intact.      The rest of a comprehensive physical examination is deferred due to Public Health Emergency video visit restrictions.      Laboratory Data:     Reviewed  12/7/2020.  CBC was unremarkable.  CMP shows improvement in albumin to 3.3.  Alkaline phosphatase 244.  AST 53.  Otherwise it was  unremarkable.      11/20/2020.  CT abdomen and pelvis showed postsurgical changes of Whipple and subtotal gastrectomy with David-en-Y reconstruction.  There is resolution of previously demonstrated fluid collection between the stomach and the spleen and decrease in sigmoid thickening.  Small left pleural effusion with atelectasis adjacent to it.    12/7/2020.  EGD showed healthy-appearing and generous pouch (remaining the stomach) with widely patent end to side gastrojejunostomy, though with the David/enteral limb mildly stenostic and tortuous at its origin. Therefore a covered metal stent was placed from stomach to jejunum across the stenotic region, resulting in straightening of the course.    Assessment and Plan:    Gastric adenocarcinona, HER-2 negative.   - Started on neoadjuvant FLOT on 3/7/2020. She has required a dose reduction of her oxaliplatin by 30% due to cold sensitivity/neuropathy with cycle #5. 5FU was decreased to 85% with cycle #6 due to mucositis/diarrhea/taste changes.   Received C#8 on 8/14/2020.  PET/CT on 9/2/2020 did not show evidence of disease.  Small peritoneal nodule was a stable.  This was most consistent with fat necrosis.    CT chest abdomen and pelvis on 9/23/2020 also does not show evidence of malignancy recurrence and the peritoneal nodule in the right upper quadrant is stable.    Before definite surgery she had diagnostic laparoscopy which was negative for evidence of malignancy so on 10/21/2020 she had exploratory laparotomy with extensive lysis of additions, partial omentectomy, resection of Gastrojejunal Anastomosis w/ En Bloc Subtotal Gastrectomy and modified D2 Lymphadenectomy with David-en-Y Gastrojejunostomy Reconstruction.  Final pathology showed Focal residual poorly differentiated adenocarcinoma (0.4cm) with signet-ring morphology status    post-neoadjuvant chemotherapy and all 9 lymph nodes were benign.  All margins were negative.  It was a irM1zN2 lesion.    We decided to  keep her on observation as she had received 6 months of neoadjuvant chemotherapy and then the definite surgery.  Plan to repeat his scans every 6 months and do EGD as needed.      CT abdomen and pelvis on 11/20/2020, did not show evidence of recurrence.    I would recommend repeating CT scan every 6 months which she will be due in May 2021.  I also recommend follow-up in cancer survivorship program and I gave him a referral for that.      Abdominal pain.  This is slowly improved.  The hematoma has almost resolved.  EGD done on 12/7/2020 did not show evidence of recurrence. As David/enteral limb was mildly stenotic and tortuous at its origin, therefore a covered metal stent was placed from stomach to jejunum across the stenotic region, resulting in straightening of the course.  She thinks that this has helped.  Currently she is on oxycodone and she takes it 3 times a day.  Continue to follow with palliative care.    Anemia.  She had iron deficiency anemia and she received INFeD on 10/16/2020.  On 12/7/2020, hemoglobin was normal.  Continue to observe.  We will repeat iron studies in a couple of months.    Neuropathy.  She thinks that the neuropathy has improved in the fingers but she still has tingling and numbness of her feet.  She tried acupuncture once but it upset her nerves so she stopped that.  I recommend that she can also look into laser therapy as some of my patients have benefited from laser therapy who had chemotherapy-induced neuropathy.  She currently is on amitriptyline at night which helps.  She will continue to follow with palliative care.        Port-A-Cath.  Continue port flushes every 8 weeks.      Return to clinic in 2 months to follow in cancer survivorship clinic.  We will repeat labs in.  In 4 months she will have repeat labs and CT scan she will follow up with me.    All questions answered and she is agreeable with the plan.    Magaly Pollard MD

## 2021-01-21 NOTE — PATIENT INSTRUCTIONS
Labs and cancer survivorship appointment in 2 months    In 4 months, labs and CT scan and see me a few days after that    Port flushes every 8 weeks.

## 2021-02-01 ENCOUNTER — MYC REFILL (OUTPATIENT)
Dept: PALLIATIVE CARE | Facility: CLINIC | Age: 56
End: 2021-02-01

## 2021-02-01 DIAGNOSIS — G89.18 POSTOPERATIVE PAIN: ICD-10-CM

## 2021-02-01 RX ORDER — OXYCODONE HYDROCHLORIDE 5 MG/1
5 TABLET ORAL 3 TIMES DAILY PRN
Qty: 42 TABLET | Refills: 0 | Status: SHIPPED | OUTPATIENT
Start: 2021-02-03 | End: 2021-02-15

## 2021-02-01 NOTE — TELEPHONE ENCOUNTER
Received mychart request from patient requesting refill of oxycodone.     Last refill: 1/20/2021  Last office visit: 1/6/2021  Scheduled for follow up 3/11/2021     Will route request to MD for review.     Reviewed [place]  Report.

## 2021-02-04 ENCOUNTER — VIRTUAL VISIT (OUTPATIENT)
Dept: GASTROENTEROLOGY | Facility: CLINIC | Age: 56
End: 2021-02-04
Payer: MEDICARE

## 2021-02-04 VITALS — BODY MASS INDEX: 22.13 KG/M2 | WEIGHT: 146 LBS | HEIGHT: 68 IN

## 2021-02-04 DIAGNOSIS — E44.1 MILD PROTEIN-CALORIE MALNUTRITION (H): Primary | ICD-10-CM

## 2021-02-04 PROCEDURE — 99215 OFFICE O/P EST HI 40 MIN: CPT | Mod: 95 | Performed by: INTERNAL MEDICINE

## 2021-02-04 ASSESSMENT — PAIN SCALES - GENERAL: PAINLEVEL: MODERATE PAIN (5)

## 2021-02-04 ASSESSMENT — MIFFLIN-ST. JEOR: SCORE: 1310.75

## 2021-02-04 NOTE — LETTER
"  2/4/2021      RE: Nathalie Bashir  [address] [place] [address]      Dear Colleague,    Thank you for referring your patient, Nathalie Bashir, to the [facility]. Please see a copy of my visit note below.    The patient has been notified of following:     \"This video visit will be conducted via a call between you and your physician/provider. We have found that certain health care needs can be provided without the need for an in-person physical exam.  This service lets us provide the care you need with a video conversation.  If a prescription is necessary we can send it directly to your pharmacy.  If lab work is needed we can place an order for that and you can then stop by our lab to have the test done at a later time.    Video visits are billed at different rates depending on your insurance coverage.  Please reach out to your insurance provider with any questions.    If during the course of the call the physician/provider feels a video visit is not appropriate, you will not be charged for this service.\"    Patient has given verbal consent for Video visit? Yes  How would you like to obtain your AVS? My Chart  If you are dropped from the video visit, the video invite should be resent to: Cell phone  Will anyone else be joining your video visit? No  {If patient encounters technical issues they should call [phone]       Video-Visit Details    Type of service:  Video Visit    Video Start Time: 1100  Video End Time: 1130    Originating Location (pt. Location): Home    Distant Location (provider location):  [facility]     Platform used for Video Visit: [facility]    Referring provider  Marianne Gonzalez  Query Malnutrition     HPI  Ms Bashir is a 54yo woman who underwent a Whipple for concerning pancreatic cyst which incidentally demonstrated signet ring gastric cancer later prompting en block subtotal gastrectomy with " david en Y gastrojejunostomy reconstruction. Following this procedure she noted difficulty with oral intake though noninvasive imaging was unrevealing. This prompted upper endoscopy in December of 2020 demonstrating a generous pouch and a widely patent end to side gastrojejunostomy, however the David limb was thought to be mildly stenotic at its origin. We therefore placed a [id number] covered metal stent with some improvement; she describes for the most part she could tolerate food better but she still had episodes of immediate and delayed postprandial nausea that has led to vomiting. She believes the nausea while constant and managed to some extent with round the clock Zofran, may be exacerbated by some particular food types. She notes gaining several pounds. Her bowel movements are regular and she has no related complaints.    Medications  Current Outpatient Medications   Medication     amitriptyline (ELAVIL) 100 MG tablet     amylase-lipase-protease (CREON 24) 37882-98894 units CPEP per EC capsule     citalopram (CELEXA) 20 MG tablet     famotidine (PEPCID) 20 MG tablet     ibuprofen (ADVIL/MOTRIN) 600 MG tablet     loperamide (IMODIUM) 2 MG capsule     loratadine-pseudoePHEDrine (CLARITIN-D 24-HOUR)  MG 24 hr tablet     magnesium 250 MG tablet     metoclopramide (REGLAN) 5 MG tablet     ondansetron (ZOFRAN) 8 MG tablet     oxyCODONE (ROXICODONE) 5 MG tablet     pantoprazole (PROTONIX) 40 MG EC tablet     potassium chloride ER (KLOR-CON M) 20 MEQ CR tablet     prochlorperazine (COMPAZINE) 10 MG tablet     simethicone 80 MG TABS     sucralfate (CARAFATE) 1 GM tablet     SUMAtriptan (IMITREX) 100 MG tablet     No current facility-administered medications for this visit.      Past Medical  Past Medical History:   Diagnosis Date     Allergic rhinitis      Cancer (H)     stomach cancer     Chronic pain      Depression      Lumbago      Migraine      Opioid dependence (H)      Other chronic pain     low back      Sacroiliitis (H)     low back pain     Trochanteric bursitis     leg length discrrepancy, hip pain     Past Surgical  Past Surgical History:   Procedure Laterality Date     ARTHROPLASTY HIP Left 2016     BACK SURGERY      L4-5 decompression     C REPAIR DETACH RETINA,SCLERAL BUCKLE       CATARACT IOL, RT/LT       CHOLECYSTECTOMY N/A 1/28/2020    Procedure: Cholecystectomy;  Surgeon: Yandel Mallory MD;  [facility]     ENDOSCOPIC RETROGRADE CHOLANGIOPANCREATOGRAM N/A 7/27/2020    Procedure: ENDOSCOPIC RETROGRADE CHOLANGIOPANCREATOGRAPHY  **Latex Allergy** with jejunal biopsies;  Surgeon: Jeet Aviles MD;  [facility]     ESOPHAGOSCOPY, GASTROSCOPY, DUODENOSCOPY (EGD), COMBINED N/A 3/3/2020    Procedure: ESOPHAGOGASTRODUODENOSCOPY, WITH ENDOSCOPIC US (enoxaparin);  Surgeon: Bakari Plata MD;  Location:  GI     ESOPHAGOSCOPY, GASTROSCOPY, DUODENOSCOPY (EGD), COMBINED N/A 12/7/2020    Procedure: Upper Endoscopy with stent placement;  Surgeon: Jeet Aviles MD;  Location:  OR     EYE SURGERY       GASTRECTOMY N/A 10/21/2020    Procedure: Open subtotal gastectomy with David en Y Reconstruction; D1 Lymph Node Disection  **Latex Allergy**;  Surgeon: Yandel Mallory MD;  [facility]     IR CHEST PORT PLACEMENT > 5 YRS OF AGE  3/26/2020     LAPAROSCOPY DIAGNOSTIC (GENERAL) N/A 10/13/2020    Procedure: Diagnostic laparoscopy with peritoneal washings  **Latex Allergy**;  Surgeon: Yandel Mallory MD;  [facility]     OPEN REDUCTION INTERNAL FIXATION RODDING INTRAMEDULLARY FEMUR Left 12/23/2014    Procedure: OPEN REDUCTION INTERNAL FIXATION RODDING INTRAMEDULLARY FEMUR;  Surgeon: Arnol Santiago MD;  [facility]     ORTHOPEDIC SURGERY       PICC DOUBLE LUMEN PLACEMENT Right 02/07/2020    5 fr double lumen 41 cm     REMOVE HARDWARE LOWER EXTREMITY Left 4/7/2015    Procedure: REMOVE HARDWARE LOWER EXTREMITY;  Surgeon: Arnol Santiago MD;  [facility]     REMOVE HARDWARE  RODDING INTRAMEDULLARY FEMUR Left 2015    Procedure: REMOVE HARDWARE RODDING INTRAMEDULLARY FEMUR;  Surgeon: Arnol Santiago MD;  [facility]     RESECT BONE LOWER EXTREMITY Left 2014    Procedure: RESECT BONE LOWER EXTREMITY;  Surgeon: Arnol Santiago MD;  [facility]     WHIPPLE PROCEDURE N/A 2020    Procedure: Open Whipple procedure and Cholecystectomy;  Surgeon: Yandel Mallory MD;  [facility] OR     WHIPPLE PROCEDURE       Social History  Social History     Socioeconomic History     Marital status:      Spouse name: Not on file     Number of children: 1     Years of education: Not on file     Highest education level: Not on file   Occupational History     Occupation: retired   Social Needs     Financial resource strain: Not on file     Food insecurity     Worry: Not on file     Inability: Not on file     Transportation needs     Medical: Not on file     Non-medical: Not on file   Tobacco Use     Smoking status: Former Smoker     Packs/day: 0.50     Years: 25.00     Pack years: 12.50     Types: Cigarettes     Quit date: 2020     Years since quittin.0     Smokeless tobacco: Never Used   Substance and Sexual Activity     Alcohol use: No     Drug use: No     Sexual activity: Not on file   Lifestyle     Physical activity     Days per week: Not on file     Minutes per session: Not on file     Stress: Not on file   Relationships     Social connections     Talks on phone: Not on file     Gets together: Not on file     Attends Catholic service: Not on file     Active member of club or organization: Not on file     Attends meetings of clubs or organizations: Not on file     Relationship status: Not on file     Intimate partner violence     Fear of current or ex partner: Not on file     Emotionally abused: Not on file     Physically abused: Not on file     Forced sexual activity: Not on file   Other Topics Concern     Parent/sibling w/ CABG, MI or angioplasty before 65F 55M?  Not Asked   Social History Narrative     Not on file     Family History  Family History   Problem Relation Age of Onset     Heart Failure Mother 77     Dementia Father         frontal lobe     No Known Problems Sister      No Known Problems Brother      Anesthesia Reaction No family hx of      Deep Vein Thrombosis No family hx of      Objective:  Reported vitals:  There were no vitals taken for this visit.   GEN: Healthy, alert and no distress  PSYCH: Alert and oriented times 3; coherent speech, normal   rate and volume, able to articulate logical thoughts, able   to abstract reason, no tangential thoughts, no hallucinations   or delusions, affect seems normal  RESP: No cough, no audible wheezing, able to talk in full sentences  Remainder of exam unable to be completed due to virtual visit     Outside Imaging summaries:    Assessment and plan:  Ms Bashir is a 56yo woman status post Whipple for a cystic lesion with incidental discovery of a signet ring gastric cancer requiring subtotal gastrectomy with grant en Y reconstruction who struggled with post operative food intolerance and nausea. While the nausea persists and is partially managed by round the clock antiemetics, her food tolerance improved significantly with placement of a covered metal stent across the gastrojejunostomy to managed what was though to be an angulated or mildly stenotic orifice. As these stent can develop complications if left in situ for over 2m, we will remove and or exchange the stent as planned on Monday (next week). We will also have her seen by our dietitian to review her diet and assess if particular foods are exacerbating her nausea. Beyond symptomatic and behavioral management, her nausea may persist. She should follow up with her PCP to exclude central, unrelated issues, as well as with Dr Mallory as scheduled for surveillance.    It was a pleasure to participate in the care of this patient; please contact us with any further questions.   A total of 45 minutes was spent in evaluation with this patient, >50% of which was counseling regarding the above delineated issues.    Jeet Aviles MD PhD FACG ESHA VALLE  Director of Endoscopy  Associate Professor of Medicine, Surgery and Pediatrics  Interventional and Therapeutic Endoscopy    [facility]  Division of Gastroenterology and Hepatology  [facility] [address] [place], [place] [address]    New Consultations  [phone]  Procedure Scheduling [phone]  Clinical Nurse Coordinator [phone]  Clinical Fax   [phone]  Administrative   [phone]  Administrative Fax  [phone]

## 2021-02-04 NOTE — NURSING NOTE
"Chief Complaint   Patient presents with     RECHECK     follow up, stent exchange on 2/8/21 at [facility]       Vitals:    02/04/21 1015   Weight: 66.2 kg (146 lb)   Height: 1.727 m (5' 8\")       Body mass index is 22.2 kg/m .      [place] Barnes LPN                        "

## 2021-02-04 NOTE — PROGRESS NOTES
"The patient has been notified of following:     \"This video visit will be conducted via a call between you and your physician/provider. We have found that certain health care needs can be provided without the need for an in-person physical exam.  This service lets us provide the care you need with a video conversation.  If a prescription is necessary we can send it directly to your pharmacy.  If lab work is needed we can place an order for that and you can then stop by our lab to have the test done at a later time.    Video visits are billed at different rates depending on your insurance coverage.  Please reach out to your insurance provider with any questions.    If during the course of the call the physician/provider feels a video visit is not appropriate, you will not be charged for this service.\"    Patient has given verbal consent for Video visit? Yes  How would you like to obtain your AVS? My Chart  If you are dropped from the video visit, the video invite should be resent to: Cell phone  Will anyone else be joining your video visit? No  {If patient encounters technical issues they should call [phone]     Video-Visit Details    Type of service:  Video Visit    Video Start Time: 1100  Video End Time: 1130    Originating Location (pt. Location): Home    Distant Location (provider location):  [facility] [place]     Platform used for Video Visit: Well    Referring provider  Marianne Gonzalez  Query Malnutrition     HPI  Ms Bashir is a 56yo woman who underwent a Whipple for concerning pancreatic cyst which incidentally demonstrated signet ring gastric cancer later prompting en block subtotal gastrectomy with grant en Y gastrojejunostomy reconstruction. Following this procedure she noted difficulty with oral intake though noninvasive imaging was unrevealing. This prompted upper endoscopy in December of 2020 demonstrating a generous pouch and a widely patent end to side " gastrojejunostomy, however the David limb was thought to be mildly stenotic at its origin. We therefore placed a [id number] covered metal stent with some improvement; she describes for the most part she could tolerate food better but she still had episodes of immediate and delayed postprandial nausea that has led to vomiting. She believes the nausea while constant and managed to some extent with round the clock Zofran, may be exacerbated by some particular food types. She notes gaining several pounds. Her bowel movements are regular and she has no related complaints.    Medications  Current Outpatient Medications   Medication     amitriptyline (ELAVIL) 100 MG tablet     amylase-lipase-protease (CREON 24) 25246-95126 units CPEP per EC capsule     citalopram (CELEXA) 20 MG tablet     famotidine (PEPCID) 20 MG tablet     ibuprofen (ADVIL/MOTRIN) 600 MG tablet     loperamide (IMODIUM) 2 MG capsule     loratadine-pseudoePHEDrine (CLARITIN-D 24-HOUR)  MG 24 hr tablet     magnesium 250 MG tablet     metoclopramide (REGLAN) 5 MG tablet     ondansetron (ZOFRAN) 8 MG tablet     oxyCODONE (ROXICODONE) 5 MG tablet     pantoprazole (PROTONIX) 40 MG EC tablet     potassium chloride ER (KLOR-CON M) 20 MEQ CR tablet     prochlorperazine (COMPAZINE) 10 MG tablet     simethicone 80 MG TABS     sucralfate (CARAFATE) 1 GM tablet     SUMAtriptan (IMITREX) 100 MG tablet     No current facility-administered medications for this visit.      Past Medical  Past Medical History:   Diagnosis Date     Allergic rhinitis      Cancer (H)     stomach cancer     Chronic pain      Depression      Lumbago      Migraine      Opioid dependence (H)      Other chronic pain     low back     Sacroiliitis (H)     low back pain     Trochanteric bursitis     leg length discrrepancy, hip pain     Past Surgical  Past Surgical History:   Procedure Laterality Date     ARTHROPLASTY HIP Left 2016     BACK SURGERY      L4-5 decompression     C REPAIR DETACH  RETINA,SCLERAL BUCKLE       CATARACT IOL, RT/LT       CHOLECYSTECTOMY N/A 1/28/2020    Procedure: Cholecystectomy;  Surgeon: Yandel Mallory MD;  [facility]     ENDOSCOPIC RETROGRADE CHOLANGIOPANCREATOGRAM N/A 7/27/2020    Procedure: ENDOSCOPIC RETROGRADE CHOLANGIOPANCREATOGRAPHY  **Latex Allergy** with jejunal biopsies;  Surgeon: Jeet Aviles MD;  [facility]     ESOPHAGOSCOPY, GASTROSCOPY, DUODENOSCOPY (EGD), COMBINED N/A 3/3/2020    Procedure: ESOPHAGOGASTRODUODENOSCOPY, WITH ENDOSCOPIC US (enoxaparin);  Surgeon: Bakari Plata MD;  [facility]     ESOPHAGOSCOPY, GASTROSCOPY, DUODENOSCOPY (EGD), COMBINED N/A 12/7/2020    Procedure: Upper Endoscopy with stent placement;  Surgeon: Jeet Aviles MD;  Location:  OR     EYE SURGERY       GASTRECTOMY N/A 10/21/2020    Procedure: Open subtotal gastectomy with David en Y Reconstruction; D1 Lymph Node Disection  **Latex Allergy**;  Surgeon: Yandel Mallory MD;  [facility]     IR CHEST PORT PLACEMENT > 5 YRS OF AGE  3/26/2020     LAPAROSCOPY DIAGNOSTIC (GENERAL) N/A 10/13/2020    Procedure: Diagnostic laparoscopy with peritoneal washings  **Latex Allergy**;  Surgeon: Yandel Mallory MD;  [facility]     OPEN REDUCTION INTERNAL FIXATION RODDING INTRAMEDULLARY FEMUR Left 12/23/2014    Procedure: OPEN REDUCTION INTERNAL FIXATION RODDING INTRAMEDULLARY FEMUR;  Surgeon: Arnol Santiago MD;  [facility]     ORTHOPEDIC SURGERY       PICC DOUBLE LUMEN PLACEMENT Right 02/07/2020    5 fr double lumen 41 cm     REMOVE HARDWARE LOWER EXTREMITY Left 4/7/2015    Procedure: REMOVE HARDWARE LOWER EXTREMITY;  Surgeon: Arnol Santiago MD;  [facility]     REMOVE HARDWARE RODDING INTRAMEDULLARY FEMUR Left 12/17/2015    Procedure: REMOVE HARDWARE RODDING INTRAMEDULLARY FEMUR;  Surgeon: Arnol Santiago MD;  [facility]     RESECT BONE LOWER EXTREMITY Left 12/23/2014    Procedure: RESECT BONE LOWER EXTREMITY;  Surgeon: Pamela  Arnol Wright MD;  [facility]     WHIPPLE PROCEDURE N/A 2020    Procedure: Open Whipple procedure and Cholecystectomy;  Surgeon: Yandel Mallory MD;  [facility]     WHIPPLE PROCEDURE       Social History  Social History     Socioeconomic History     Marital status:      Spouse name: Not on file     Number of children: 1     Years of education: Not on file     Highest education level: Not on file   Occupational History     Occupation: retired   Social Needs     Financial resource strain: Not on file     Food insecurity     Worry: Not on file     Inability: Not on file     Transportation needs     Medical: Not on file     Non-medical: Not on file   Tobacco Use     Smoking status: Former Smoker     Packs/day: 0.50     Years: 25.00     Pack years: 12.50     Types: Cigarettes     Quit date: 2020     Years since quittin.0     Smokeless tobacco: Never Used   Substance and Sexual Activity     Alcohol use: No     Drug use: No     Sexual activity: Not on file   Lifestyle     Physical activity     Days per week: Not on file     Minutes per session: Not on file     Stress: Not on file   Relationships     Social connections     Talks on phone: Not on file     Gets together: Not on file     Attends Evangelical service: Not on file     Active member of club or organization: Not on file     Attends meetings of clubs or organizations: Not on file     Relationship status: Not on file     Intimate partner violence     Fear of current or ex partner: Not on file     Emotionally abused: Not on file     Physically abused: Not on file     Forced sexual activity: Not on file   Other Topics Concern     Parent/sibling w/ CABG, MI or angioplasty before 65F 55M? Not Asked   Social History Narrative     Not on file     Family History  Family History   Problem Relation Age of Onset     Heart Failure Mother 77     Dementia Father         frontal lobe     No Known Problems Sister      No Known Problems Brother       Anesthesia Reaction No family hx of      Deep Vein Thrombosis No family hx of      Objective:  Reported vitals:  There were no vitals taken for this visit.   GEN: Healthy, alert and no distress  PSYCH: Alert and oriented times 3; coherent speech, normal   rate and volume, able to articulate logical thoughts, able   to abstract reason, no tangential thoughts, no hallucinations   or delusions, affect seems normal  RESP: No cough, no audible wheezing, able to talk in full sentences  Remainder of exam unable to be completed due to virtual visit     Outside Imaging summaries:    Assessment and plan:  Ms Bashir is a 56yo woman status post Whipple for a cystic lesion with incidental discovery of a signet ring gastric cancer requiring subtotal gastrectomy with grant en Y reconstruction who struggled with post operative food intolerance and nausea. While the nausea persists and is partially managed by round the clock antiemetics, her food tolerance improved significantly with placement of a covered metal stent across the gastrojejunostomy to managed what was though to be an angulated or mildly stenotic orifice. As these stent can develop complications if left in situ for over 2m, we will remove and or exchange the stent as planned on Monday (next week). We will also have her seen by our dietitian to review her diet and assess if particular foods are exacerbating her nausea. Beyond symptomatic and behavioral management, her nausea may persist. She should follow up with her PCP to exclude central, unrelated issues, as well as with Dr Mallory as scheduled for surveillance.    It was a pleasure to participate in the care of this patient; please contact us with any further questions.  A total of 45 minutes was spent in evaluation with this patient, >50% of which was counseling regarding the above delineated issues.    Jeet Aviles MD PhD FACG ESHA VALLE  Director of Endoscopy  Associate Professor of Medicine, Surgery and  Pediatrics  Interventional and Therapeutic Endoscopy    [facility]  Division of Gastroenterology and Hepatology  [facility] [address] [place], [place] [address]    New Consultations  [phone]  Procedure Scheduling [phone]  Clinical Nurse Coordinator [phone]  Clinical Fax   [phone]  Administrative   [phone]  Administrative Fax  [phone]

## 2021-02-05 DIAGNOSIS — Z11.59 ENCOUNTER FOR SCREENING FOR OTHER VIRAL DISEASES: ICD-10-CM

## 2021-02-05 PROBLEM — C16.9 GASTRIC ADENOCARCINOMA (H): Status: ACTIVE | Noted: 2020-02-19

## 2021-02-05 LAB
SARS-COV-2 RNA RESP QL NAA+PROBE: NORMAL
SPECIMEN SOURCE: NORMAL

## 2021-02-05 PROCEDURE — U0003 INFECTIOUS AGENT DETECTION BY NUCLEIC ACID (DNA OR RNA); SEVERE ACUTE RESPIRATORY SYNDROME CORONAVIRUS 2 (SARS-COV-2) (CORONAVIRUS DISEASE [COVID-19]), AMPLIFIED PROBE TECHNIQUE, MAKING USE OF HIGH THROUGHPUT TECHNOLOGIES AS DESCRIBED BY CMS-2020-01-R: HCPCS | Performed by: INTERNAL MEDICINE

## 2021-02-05 PROCEDURE — U0005 INFEC AGEN DETEC AMPLI PROBE: HCPCS | Performed by: INTERNAL MEDICINE

## 2021-02-05 RX ORDER — CYCLOBENZAPRINE HCL 10 MG
10 TABLET ORAL 3 TIMES DAILY PRN
COMMUNITY
End: 2022-05-03

## 2021-02-05 RX ORDER — HYDROXYZINE PAMOATE 25 MG/1
25-50 CAPSULE ORAL 4 TIMES DAILY PRN
COMMUNITY
End: 2024-07-24

## 2021-02-05 RX ORDER — IBUPROFEN 200 MG
CAPSULE ORAL 3 TIMES DAILY
Status: ON HOLD | COMMUNITY
End: 2022-08-23

## 2021-02-05 RX ORDER — MULTIPLE VITAMINS W/ MINERALS TAB 9MG-400MCG
1 TAB ORAL DAILY
Status: ON HOLD | COMMUNITY
End: 2022-08-23

## 2021-02-05 RX ORDER — FLUTICASONE PROPIONATE 50 MCG
1 SPRAY, SUSPENSION (ML) NASAL
COMMUNITY

## 2021-02-05 NOTE — PATIENT INSTRUCTIONS
Follow up:    Dr. Aviles has outlined the following steps after your recent clinic visit:    1) Arrive for procedure as planned on Monday (2/8/21) with Dr. Aviles  2) Will send a referral to our dietitian      3) Follow up with your PCP     4) Follow up with Dr Mallory as scheduled for surveillance.      Please call with any questions or concerns regarding your clinic visit today.    It is a pleasure being involved in your health care.    Contacts post-consultation depending on your need:    Schedule Clinic Appointments            [phone] # 1   M-F 7:30 - 5 pm    Sofia George RN Care Coordinator  [phone]    [place] Barnes LPN    [phone]     OR Procedure Scheduling                             [phone]    My Chart is available 24 hours a day and is a secure way to access your records and communicate with your care team.  I strongly recommend signing up if you haven't already done so, if you are comfortable with computers.  If you would like to inquire about this or are having problems with My Chart access, you may call [phone] or go online at [email or web address].  Please allow at least 24 hours for a response and extra time on weekends and Holidays.

## 2021-02-06 LAB
LABORATORY COMMENT REPORT: NORMAL
SARS-COV-2 RNA RESP QL NAA+PROBE: NEGATIVE
SPECIMEN SOURCE: NORMAL

## 2021-02-07 ENCOUNTER — ANESTHESIA EVENT (OUTPATIENT)
Dept: SURGERY | Facility: CLINIC | Age: 56
End: 2021-02-07
Payer: MEDICARE

## 2021-02-07 ASSESSMENT — LIFESTYLE VARIABLES: TOBACCO_USE: 1

## 2021-02-08 ENCOUNTER — APPOINTMENT (OUTPATIENT)
Dept: GENERAL RADIOLOGY | Facility: CLINIC | Age: 56
End: 2021-02-08
Attending: INTERNAL MEDICINE
Payer: MEDICARE

## 2021-02-08 ENCOUNTER — HOSPITAL ENCOUNTER (OUTPATIENT)
Facility: CLINIC | Age: 56
Discharge: HOME | End: 2021-02-08
Attending: INTERNAL MEDICINE | Admitting: INTERNAL MEDICINE
Payer: MEDICARE

## 2021-02-08 ENCOUNTER — ANESTHESIA (OUTPATIENT)
Dept: SURGERY | Facility: CLINIC | Age: 56
End: 2021-02-08
Payer: MEDICARE

## 2021-02-08 VITALS
HEIGHT: 69 IN | TEMPERATURE: 98.6 F | SYSTOLIC BLOOD PRESSURE: 114 MMHG | RESPIRATION RATE: 14 BRPM | BODY MASS INDEX: 21.12 KG/M2 | DIASTOLIC BLOOD PRESSURE: 64 MMHG | WEIGHT: 142.6 LBS | OXYGEN SATURATION: 100 % | HEART RATE: 93 BPM

## 2021-02-08 DIAGNOSIS — K20.90 ESOPHAGITIS: Primary | ICD-10-CM

## 2021-02-08 DIAGNOSIS — Z98.890 HISTORY OF GASTRIC SURGERY: ICD-10-CM

## 2021-02-08 LAB — UPPER GI ENDOSCOPY: NORMAL

## 2021-02-08 PROCEDURE — 250N000011 HC RX IP 250 OP 636: Performed by: NURSE ANESTHETIST, CERTIFIED REGISTERED

## 2021-02-08 PROCEDURE — 999N000141 HC STATISTIC PRE-PROCEDURE NURSING ASSESSMENT: Performed by: INTERNAL MEDICINE

## 2021-02-08 PROCEDURE — 258N000003 HC RX IP 258 OP 636

## 2021-02-08 PROCEDURE — 370N000017 HC ANESTHESIA TECHNICAL FEE, PER MIN: Performed by: INTERNAL MEDICINE

## 2021-02-08 PROCEDURE — 250N000011 HC RX IP 250 OP 636: Performed by: ANESTHESIOLOGY

## 2021-02-08 PROCEDURE — 710N000009 HC RECOVERY PHASE 1, LEVEL 1, PER MIN: Performed by: INTERNAL MEDICINE

## 2021-02-08 PROCEDURE — 258N000003 HC RX IP 258 OP 636: Performed by: NURSE ANESTHETIST, CERTIFIED REGISTERED

## 2021-02-08 PROCEDURE — 250N000025 HC SEVOFLURANE, PER MIN: Performed by: INTERNAL MEDICINE

## 2021-02-08 PROCEDURE — 250N000009 HC RX 250: Performed by: NURSE ANESTHETIST, CERTIFIED REGISTERED

## 2021-02-08 PROCEDURE — 710N000012 HC RECOVERY PHASE 2, PER MINUTE: Performed by: INTERNAL MEDICINE

## 2021-02-08 PROCEDURE — 360N000075 HC SURGERY LEVEL 2, PER MIN: Performed by: INTERNAL MEDICINE

## 2021-02-08 PROCEDURE — 999N000179 XR SURGERY CARM FLUORO LESS THAN 5 MIN W STILLS: Mod: TC

## 2021-02-08 PROCEDURE — 250N000011 HC RX IP 250 OP 636

## 2021-02-08 PROCEDURE — 250N000009 HC RX 250

## 2021-02-08 RX ORDER — LIDOCAINE 40 MG/G
CREAM TOPICAL
Status: DISCONTINUED | OUTPATIENT
Start: 2021-02-08 | End: 2021-02-08 | Stop reason: HOSPADM

## 2021-02-08 RX ORDER — PROPOFOL 10 MG/ML
INJECTION, EMULSION INTRAVENOUS CONTINUOUS PRN
Status: DISCONTINUED | OUTPATIENT
Start: 2021-02-08 | End: 2021-02-08

## 2021-02-08 RX ORDER — ONDANSETRON 2 MG/ML
4 INJECTION INTRAMUSCULAR; INTRAVENOUS EVERY 30 MIN PRN
Status: DISCONTINUED | OUTPATIENT
Start: 2021-02-08 | End: 2021-02-08 | Stop reason: HOSPADM

## 2021-02-08 RX ORDER — MEPERIDINE HYDROCHLORIDE 25 MG/ML
12.5 INJECTION INTRAMUSCULAR; INTRAVENOUS; SUBCUTANEOUS
Status: DISCONTINUED | OUTPATIENT
Start: 2021-02-08 | End: 2021-02-08 | Stop reason: HOSPADM

## 2021-02-08 RX ORDER — HYDROMORPHONE HYDROCHLORIDE 1 MG/ML
.3-.5 INJECTION, SOLUTION INTRAMUSCULAR; INTRAVENOUS; SUBCUTANEOUS EVERY 10 MIN PRN
Status: DISCONTINUED | OUTPATIENT
Start: 2021-02-08 | End: 2021-02-08 | Stop reason: HOSPADM

## 2021-02-08 RX ORDER — NALOXONE HYDROCHLORIDE 0.4 MG/ML
0.2 INJECTION, SOLUTION INTRAMUSCULAR; INTRAVENOUS; SUBCUTANEOUS
Status: DISCONTINUED | OUTPATIENT
Start: 2021-02-08 | End: 2021-02-08 | Stop reason: HOSPADM

## 2021-02-08 RX ORDER — NALOXONE HYDROCHLORIDE 0.4 MG/ML
0.4 INJECTION, SOLUTION INTRAMUSCULAR; INTRAVENOUS; SUBCUTANEOUS
Status: DISCONTINUED | OUTPATIENT
Start: 2021-02-08 | End: 2021-02-08 | Stop reason: HOSPADM

## 2021-02-08 RX ORDER — ONDANSETRON 2 MG/ML
INJECTION INTRAMUSCULAR; INTRAVENOUS PRN
Status: DISCONTINUED | OUTPATIENT
Start: 2021-02-08 | End: 2021-02-08

## 2021-02-08 RX ORDER — FENTANYL CITRATE 50 UG/ML
25-50 INJECTION, SOLUTION INTRAMUSCULAR; INTRAVENOUS
Status: DISCONTINUED | OUTPATIENT
Start: 2021-02-08 | End: 2021-02-08 | Stop reason: HOSPADM

## 2021-02-08 RX ORDER — LIDOCAINE HYDROCHLORIDE 20 MG/ML
15 SOLUTION OROPHARYNGEAL
Qty: 100 ML | Refills: 0 | Status: SHIPPED | OUTPATIENT
Start: 2021-02-08 | End: 2022-04-28

## 2021-02-08 RX ORDER — FENTANYL CITRATE 50 UG/ML
INJECTION, SOLUTION INTRAMUSCULAR; INTRAVENOUS PRN
Status: DISCONTINUED | OUTPATIENT
Start: 2021-02-08 | End: 2021-02-08

## 2021-02-08 RX ORDER — ONDANSETRON 2 MG/ML
4 INJECTION INTRAMUSCULAR; INTRAVENOUS
Status: DISCONTINUED | OUTPATIENT
Start: 2021-02-08 | End: 2021-02-08 | Stop reason: HOSPADM

## 2021-02-08 RX ORDER — FENTANYL CITRATE 50 UG/ML
50 INJECTION, SOLUTION INTRAMUSCULAR; INTRAVENOUS
Status: DISCONTINUED | OUTPATIENT
Start: 2021-02-08 | End: 2021-02-08 | Stop reason: HOSPADM

## 2021-02-08 RX ORDER — SODIUM CHLORIDE, SODIUM LACTATE, POTASSIUM CHLORIDE, CALCIUM CHLORIDE 600; 310; 30; 20 MG/100ML; MG/100ML; MG/100ML; MG/100ML
INJECTION, SOLUTION INTRAVENOUS CONTINUOUS
Status: DISCONTINUED | OUTPATIENT
Start: 2021-02-08 | End: 2021-02-08 | Stop reason: HOSPADM

## 2021-02-08 RX ORDER — DEXAMETHASONE SODIUM PHOSPHATE 4 MG/ML
INJECTION, SOLUTION INTRA-ARTICULAR; INTRALESIONAL; INTRAMUSCULAR; INTRAVENOUS; SOFT TISSUE PRN
Status: DISCONTINUED | OUTPATIENT
Start: 2021-02-08 | End: 2021-02-08

## 2021-02-08 RX ORDER — LIDOCAINE HYDROCHLORIDE 20 MG/ML
INJECTION, SOLUTION INFILTRATION; PERINEURAL PRN
Status: DISCONTINUED | OUTPATIENT
Start: 2021-02-08 | End: 2021-02-08

## 2021-02-08 RX ORDER — PROPOFOL 10 MG/ML
INJECTION, EMULSION INTRAVENOUS PRN
Status: DISCONTINUED | OUTPATIENT
Start: 2021-02-08 | End: 2021-02-08

## 2021-02-08 RX ORDER — ONDANSETRON 4 MG/1
4 TABLET, ORALLY DISINTEGRATING ORAL EVERY 30 MIN PRN
Status: DISCONTINUED | OUTPATIENT
Start: 2021-02-08 | End: 2021-02-08 | Stop reason: HOSPADM

## 2021-02-08 RX ORDER — SODIUM CHLORIDE, SODIUM LACTATE, POTASSIUM CHLORIDE, CALCIUM CHLORIDE 600; 310; 30; 20 MG/100ML; MG/100ML; MG/100ML; MG/100ML
INJECTION, SOLUTION INTRAVENOUS CONTINUOUS PRN
Status: DISCONTINUED | OUTPATIENT
Start: 2021-02-08 | End: 2021-02-08

## 2021-02-08 RX ADMIN — PROPOFOL 140 MG: 10 INJECTION, EMULSION INTRAVENOUS at 07:29

## 2021-02-08 RX ADMIN — HYDROMORPHONE HYDROCHLORIDE 0.5 MG: 1 INJECTION, SOLUTION INTRAMUSCULAR; INTRAVENOUS; SUBCUTANEOUS at 08:26

## 2021-02-08 RX ADMIN — FENTANYL CITRATE 75 MCG: 50 INJECTION, SOLUTION INTRAMUSCULAR; INTRAVENOUS at 07:29

## 2021-02-08 RX ADMIN — ONDANSETRON 4 MG: 2 INJECTION INTRAMUSCULAR; INTRAVENOUS at 08:03

## 2021-02-08 RX ADMIN — DEXAMETHASONE SODIUM PHOSPHATE 4 MG: 4 INJECTION, SOLUTION INTRA-ARTICULAR; INTRALESIONAL; INTRAMUSCULAR; INTRAVENOUS; SOFT TISSUE at 07:42

## 2021-02-08 RX ADMIN — SODIUM CHLORIDE, POTASSIUM CHLORIDE, SODIUM LACTATE AND CALCIUM CHLORIDE: 600; 310; 30; 20 INJECTION, SOLUTION INTRAVENOUS at 07:22

## 2021-02-08 RX ADMIN — PROPOFOL 25 MCG/KG/MIN: 10 INJECTION, EMULSION INTRAVENOUS at 07:34

## 2021-02-08 RX ADMIN — LIDOCAINE HYDROCHLORIDE 100 MG: 20 INJECTION, SOLUTION INFILTRATION; PERINEURAL at 07:29

## 2021-02-08 RX ADMIN — PHENYLEPHRINE HYDROCHLORIDE 100 MCG: 10 INJECTION INTRAVENOUS at 07:48

## 2021-02-08 RX ADMIN — PHENYLEPHRINE HYDROCHLORIDE 100 MCG: 10 INJECTION INTRAVENOUS at 07:36

## 2021-02-08 RX ADMIN — MIDAZOLAM 2 MG: 1 INJECTION INTRAMUSCULAR; INTRAVENOUS at 07:22

## 2021-02-08 RX ADMIN — SUCCINYLCHOLINE CHLORIDE 80 MG: 20 INJECTION, SOLUTION INTRAMUSCULAR; INTRAVENOUS; PARENTERAL at 07:29

## 2021-02-08 RX ADMIN — PHENYLEPHRINE HYDROCHLORIDE 100 MCG: 10 INJECTION INTRAVENOUS at 07:43

## 2021-02-08 ASSESSMENT — COPD QUESTIONNAIRES: COPD: 0

## 2021-02-08 ASSESSMENT — MIFFLIN-ST. JEOR: SCORE: 1303.27

## 2021-02-08 ASSESSMENT — ENCOUNTER SYMPTOMS
DYSRHYTHMIAS: 0
SEIZURES: 0
ORTHOPNEA: 0

## 2021-02-08 NOTE — ANESTHESIA PREPROCEDURE EVALUATION
Anesthesia Pre-Procedure Evaluation    Patient: Nathalie Bashir   MRN: [id number] : 1965        Preoperative Diagnosis: History of gastric surgery [Z98.890]   Procedure : Procedure(s):  ESOPHAGOGASTRODUODENOSCOPY (EGD)     Past Medical History:   Diagnosis Date     Allergic rhinitis      Cancer (H)     stomach cancer     Chronic pain      Depression      Gastric adenocarcinoma (H)      Lumbago      Migraine      Opioid dependence (H)      Other chronic pain     low back     Sacroiliitis (H)     low back pain     Trochanteric bursitis     leg length discrrepancy, hip pain      Past Surgical History:   Procedure Laterality Date     ARTHROPLASTY HIP Left 2016     BACK SURGERY      L4-5 decompression     C REPAIR DETACH RETINA,SCLERAL BUCKLE       CATARACT IOL, RT/LT       CHOLECYSTECTOMY N/A 2020    Procedure: Cholecystectomy;  Surgeon: Yandel Mallory MD;  [facility]     ENDOSCOPIC RETROGRADE CHOLANGIOPANCREATOGRAM N/A 2020    Procedure: ENDOSCOPIC RETROGRADE CHOLANGIOPANCREATOGRAPHY  **Latex Allergy** with jejunal biopsies;  Surgeon: Jeet Aviles MD;  Location:  OR     ESOPHAGOSCOPY, GASTROSCOPY, DUODENOSCOPY (EGD), COMBINED N/A 3/3/2020    Procedure: ESOPHAGOGASTRODUODENOSCOPY, WITH ENDOSCOPIC US (enoxaparin);  Surgeon: Bakari Plata MD;  Location:  GI     ESOPHAGOSCOPY, GASTROSCOPY, DUODENOSCOPY (EGD), COMBINED N/A 2020    Procedure: Upper Endoscopy with stent placement;  Surgeon: Jeet Aviles MD;  Location:  OR     EYE SURGERY       GASTRECTOMY N/A 10/21/2020    Procedure: Open subtotal gastectomy with David en Y Reconstruction; D1 Lymph Node Disection  **Latex Allergy**;  Surgeon: Yandel Mallory MD;  Location:  OR     IR CHEST PORT PLACEMENT > 5 YRS OF AGE  3/26/2020     LAPAROSCOPY DIAGNOSTIC (GENERAL) N/A 10/13/2020    Procedure: Diagnostic laparoscopy with peritoneal washings  **Latex Allergy**;  Surgeon: Yandel Mallory MD;  Location:  OR      OPEN REDUCTION INTERNAL FIXATION RODDING INTRAMEDULLARY FEMUR Left 2014    Procedure: OPEN REDUCTION INTERNAL FIXATION RODDING INTRAMEDULLARY FEMUR;  Surgeon: Arnol Santiago MD;  Location: [facility]     ORTHOPEDIC SURGERY       PICC DOUBLE LUMEN PLACEMENT Right 2020    5 fr double lumen 41 cm     REMOVE HARDWARE LOWER EXTREMITY Left 2015    Procedure: REMOVE HARDWARE LOWER EXTREMITY;  Surgeon: Arnol Santiago MD;  [facility]     REMOVE HARDWARE RODDING INTRAMEDULLARY FEMUR Left 2015    Procedure: REMOVE HARDWARE RODDING INTRAMEDULLARY FEMUR;  Surgeon: Arnol Santiago MD;  [facility]     RESECT BONE LOWER EXTREMITY Left 2014    Procedure: RESECT BONE LOWER EXTREMITY;  Surgeon: Arnol Santiago MD;  [facility]     WHIPPLE PROCEDURE N/A 2020    Procedure: Open Whipple procedure and Cholecystectomy;  Surgeon: Yandel Mallory MD;  [facility]     WHIPPLE PROCEDURE        Allergies   Allergen Reactions     Gluten Meal Palpitations     Augmentin Rash     Patient tolerated Zosyn in 2020     Oxycontin [Oxycodone]      Confusion, loopy, oxycodone is tolerated     Pregabalin      interfered with Cymbalta     Topiramate      Tongue numbness, taste alteration, irritable      Acetaminophen Nausea     Urine retention       D[place] Extract      Gabapentin Dizziness and GI Disturbance     Ketorolac Headache     Latex Rash     Methadone Fatigue     Mold      Naprosyn [Naproxen] Dizziness and GI Disturbance     Seasonal Allergies       Social History     Tobacco Use     Smoking status: Former Smoker     Packs/day: 0.50     Years: 25.00     Pack years: 12.50     Types: Cigarettes     Quit date: 2020     Years since quittin.1     Smokeless tobacco: Never Used   Substance Use Topics     Alcohol use: No      Wt Readings from Last 1 Encounters:   21 66.2 kg (146 lb)        Prior to Admission medications    Medication Sig Start Date End Date Taking? Authorizing  Provider   calcium carbonate 500 mg, elemental, (OSCAL 500) 1250 (500 Ca) MG TABS tablet Take by mouth 3 times daily   Yes Reported, Patient   Cyanocobalamin 1000 MCG CAPS Take by mouth daily   Yes Reported, Patient   cyclobenzaprine (FLEXERIL) 10 MG tablet Take 10 mg by mouth 3 times daily as needed for muscle spasms   Yes Reported, Patient   fluticasone (FLONASE) 50 MCG/ACT nasal spray Spray 1 spray into both nostrils daily   Yes Reported, Patient   hydrOXYzine (VISTARIL) 25 MG capsule Take 25-50 mg by mouth 4 times daily as needed for itching   Yes Reported, Patient   multivitamin w/minerals (MULTI-VITAMIN) tablet Take 1 tablet by mouth daily   Yes Reported, Patient   amitriptyline (ELAVIL) 100 MG tablet Take 1 tablet (100 mg) by mouth At Bedtime 1/7/21   Al Marie MD   amylase-lipase-protease (CREON 24) 68365-05988 units CPEP per EC capsule 2 capsules with meals and 1 capsule with snacks 11/19/20   Magaly Pollard MD   citalopram (CELEXA) 20 MG tablet Take 20 mg by mouth every morning  2/11/19   Reported, Patient   famotidine (PEPCID) 20 MG tablet Take 20 mg by mouth 2 times daily as needed     Reported, Patient   ibuprofen (ADVIL/MOTRIN) 600 MG tablet Take 1 tablet (600 mg) by mouth every 6 hours as needed for moderate pain 10/26/20   Pia Oneil, PA-C   loperamide (IMODIUM) 2 MG capsule Take 4 mg by mouth 4 times daily as needed for diarrhea     Reported, Patient   loratadine-pseudoePHEDrine (CLARITIN-D 24-HOUR)  MG 24 hr tablet Take 1 tablet by mouth daily as needed     Reported, Patient   magnesium 250 MG tablet Take 1 tablet by mouth daily    Reported, Patient   metoclopramide (REGLAN) 5 MG tablet Take 5 mg by mouth 2 times daily as needed    Reported, Patient   ondansetron (ZOFRAN) 8 MG tablet Take 1 tablet (8 mg) by mouth every 8 hours as needed (Nausea/Vomiting) 11/27/20   Yandel Mallory MD   oxyCODONE (ROXICODONE) 5 MG tablet Take 1 tablet (5 mg) by mouth 3 times daily as needed for  moderate to severe pain Maximum 3 pills/day. 2/3/21   Precious Scott MD   pantoprazole (PROTONIX) 40 MG EC tablet Take 1 tablet (40 mg) by mouth 2 times daily 12/9/20   Magaly Pollard MD   potassium chloride ER (KLOR-CON M) 20 MEQ CR tablet TAKE ONE TABLET BY MOUTH ONCE DAILY  Patient taking differently: daily as needed  9/24/20   Roxana Sweet PA-C   prochlorperazine (COMPAZINE) 10 MG tablet Take 1 tablet (10 mg) by mouth every 6 hours as needed for nausea or vomiting 1/7/21   Al Marie MD   simethicone 80 MG TABS Take 1 tablet by mouth every 6 hours as needed (bloating, gas, belching) 10/29/20   Precious Scott MD   sucralfate (CARAFATE) 1 GM tablet Take 1 tablet (1 g) by mouth 4 times daily as needed (reflux.) 11/25/20   Yandel Mallory MD   SUMAtriptan (IMITREX) 100 MG tablet Take 100 mg by mouth at onset of headache for migraine    Unknown, Entered By History     No current Epic-ordered facility-administered medications on file.      Current Outpatient Medications Ordered in Epic   Medication     calcium carbonate 500 mg, elemental, (OSCAL 500) 1250 (500 Ca) MG TABS tablet     Cyanocobalamin 1000 MCG CAPS     cyclobenzaprine (FLEXERIL) 10 MG tablet     fluticasone (FLONASE) 50 MCG/ACT nasal spray     hydrOXYzine (VISTARIL) 25 MG capsule     multivitamin w/minerals (MULTI-VITAMIN) tablet     amitriptyline (ELAVIL) 100 MG tablet     amylase-lipase-protease (CREON 24) 84551-40197 units CPEP per EC capsule     citalopram (CELEXA) 20 MG tablet     famotidine (PEPCID) 20 MG tablet     ibuprofen (ADVIL/MOTRIN) 600 MG tablet     loperamide (IMODIUM) 2 MG capsule     loratadine-pseudoePHEDrine (CLARITIN-D 24-HOUR)  MG 24 hr tablet     magnesium 250 MG tablet     metoclopramide (REGLAN) 5 MG tablet     ondansetron (ZOFRAN) 8 MG tablet     oxyCODONE (ROXICODONE) 5 MG tablet     pantoprazole (PROTONIX) 40 MG EC tablet     potassium chloride ER (KLOR-CON M) 20 MEQ CR tablet     prochlorperazine  (COMPAZINE) 10 MG tablet     simethicone 80 MG TABS     sucralfate (CARAFATE) 1 GM tablet     SUMAtriptan (IMITREX) 100 MG tablet       Recent Labs   Lab Test 10/21/20  0715   ABO A   RH Pos     Recent Labs   Lab Test 10/21/20  0606   HCG Negative     Anesthesia Evaluation   Pt has had prior anesthetic. Type: General (Mac 3 = Grade 1 View previously).    No history of anesthetic complications       ROS/MED HX  ENT/Pulmonary:     (+) allergic rhinitis, tobacco use, Past use,  (-) asthma, COPD, sleep apnea and recent URI   Neurologic:     (+) migraines,  (-) no seizures and no CVA   Cardiovascular:     (+) -----Irregular Heartbeat/Palpitations, Previous cardiac testing   Echo: Date: 7/2020 Results:  Global and regional left ventricular function is normal with an EF of 60-65%.  Right ventricular function, chamber size, wall motion, and thickness are  normal.  Pulmonary artery systolic pressure cannot be assessed.  The inferior vena cava is normal.  No pericardial effusion is present.  Previous study not available for comparison.  Stress Test: Date: Results:    ECG Reviewed: Date: 8/2020 Results:  NSR, short TN interval, no acute abnormalities  Cath: Date: Results:   (-) angina, hypertension, CAD, orthopnea/PND, syncope, arrhythmias and angina   METS/Exercise Tolerance:     Hematologic:     (+) history of blood transfusion, no previous transfusion reaction,     Musculoskeletal: Comment: Cervical Spondylosis      GI/Hepatic: Comment: S/p Subtotal Gastrectomy & Rouex-en-Y Bypass    (+) GERD, Asymptomatic on medication,  (-) liver disease   Renal/Genitourinary:    (-) renal disease   Endo:    (-) Type II DM, thyroid disease and chronic steroid usage   Psychiatric/Substance Use:     (+) psychiatric history depression H/O chronic opiod use .  (-) alcohol abuse history   Infectious Disease:       Malignancy:   (+) Malignancy, History of GI.GI CA  Remission status post Surgery and Chemo.        Other:      (+) , H/O Chronic  Pain,           OUTSIDE LABS:  CBC:   Lab Results   Component Value Date    WBC 7.9 12/07/2020    WBC 5.8 11/20/2020    HGB 13.1 12/07/2020    HGB 9.8 (L) 11/20/2020    HCT 41.9 12/07/2020    HCT 32.2 (L) 11/20/2020     12/07/2020     11/20/2020     BMP:   Lab Results   Component Value Date     12/07/2020     11/20/2020    POTASSIUM 3.8 12/07/2020    POTASSIUM 3.5 11/20/2020    CHLORIDE 107 12/07/2020    CHLORIDE 109 11/20/2020    CO2 25 12/07/2020    CO2 26 11/20/2020    BUN 10 12/07/2020    BUN 5 (L) 11/20/2020    CR 0.73 12/07/2020    CR 0.71 11/20/2020    GLC 97 12/07/2020     (H) 11/20/2020     COAGS:   Lab Results   Component Value Date    INR 1.21 (H) 12/07/2020    FIBR 348 07/22/2020     POC:   Lab Results   Component Value Date     (H) 10/25/2020    HCG Negative 10/21/2020     HEPATIC:   Lab Results   Component Value Date    ALBUMIN 3.3 (L) 12/07/2020    PROTTOTAL 7.6 12/07/2020    ALT 42 12/07/2020    AST 53 (H) 12/07/2020    ALKPHOS 244 (H) 12/07/2020    BILITOTAL 0.3 12/07/2020     OTHER:   Lab Results   Component Value Date    LACT 2.4 (H) 08/21/2020    A1C 5.6 10/21/2020    PETRA 8.7 12/07/2020    PHOS 3.7 11/06/2020    MAG 2.0 11/06/2020    LIPASE 41 (L) 12/07/2020    AMYLASE 28 (L) 12/07/2020    TSH 4.16 (H) 07/26/2020    T4 1.38 07/26/2020    CRP 11.0 (H) 07/29/2020    SED 27 07/20/2020       Anesthesia Plan    ASA Status:  3   NPO Status:  NPO Appropriate    Anesthesia Type: General     - Airway: ETT   Induction: Intravenous, Propofol, RSI   Maintenance: Balanced.        Consents    Anesthesia Plan(s) and associated risks, benefits, and realistic alternatives discussed. Questions answered and patient/representative(s) expressed understanding.     - Discussed with:  Patient         Postoperative Care    Pain management: IV analgesics.   PONV prophylaxis: Ondansetron (or other 5HT-3), Background Propofol Infusion, Dexamethasone or Solumedrol     Comments:                 Vahid Hatr MD

## 2021-02-08 NOTE — DISCHARGE INSTRUCTIONS
Same Day Surgery Discharge Instructions for  Sedation and General Anesthesia       It's not unusual to feel dizzy, light-headed or faint for up to 24 hours after surgery or while taking pain medication.  If you have these symptoms: sit for a few minutes before standing and have someone assist you when you get up to walk or use the bathroom.      You should rest and relax for the next 24 hours. We recommend you make arrangements to have an adult stay with you for at least 24 hours after your discharge.  Avoid hazardous and strenuous activity.      DO NOT DRIVE any vehicle or operate mechanical equipment for 24 hours following the end of your surgery.  Even though you may feel normal, your reactions may be affected by the medication you have received.      Do not drink alcoholic beverages for 24 hours following surgery.       Slowly progress to your regular diet as you feel able. It's not unusual to feel nauseated and/or vomit after receiving anesthesia.  If you develop these symptoms, drink clear liquids (apple juice, ginger ale, broth, 7-up, etc. ) until you feel better.  If your nausea and vomiting persists for 24 hours, please notify your surgeon.        All narcotic pain medications, along with inactivity and anesthesia, can cause constipation. Drinking plenty of liquids and increasing fiber intake will help.      For any questions of a medical nature, call your surgeon.      Do not make important decisions for 24 hours.      If you had general anesthesia, you may have a sore throat for a couple of days related to the breathing tube used during surgery.  You may use Cepacol lozenges to help with this discomfort.  If it worsens or if you develop a fever, contact your surgeon.       If you feel your pain is not well managed with the pain medications prescribed by your surgeon, please contact your surgeon's office to let them know so they can address your concerns.       CoVid 19 Information    We want to give you  information regarding Covid. Please consult your primary care provider with any questions you might have.     Patient who have symptoms (cough, fever, or shortness of breath), need to isolate for 7 days from when symptoms started OR 72 hours after fever resolves (without fever reducing medications) AND improvement of respiratory symptoms (whichever is longer).      Isolate yourself at home (in own room/own bathroom if possible)    Do Not allow any visitors    Do Not go to work or school    Do Not go to Mu-[employer],  centers, shopping, or other public places.    Do Not shake hands.    Avoid close and intimate contact with others (hugging, kissing).    Follow CDC recommendations for household cleaning of frequently touched services.     After the initial 7 days, continue to isolate yourself from household members as much as possible. To continue decrease the risk of community spread and exposure, you and any members of your household should limit activities in public for 14 days after starting home isolation.     You can reference the following CDC link for helpful home isolation/care tips:  https://www.cdc.gov/coronavirus/2019-ncov/downloads/10Things.pdf    Protect Others:    Cover Your Mouth and Nose with a mask, disposable tissue or wash cloth to avoid spreading germs to others.    Wash your hands and face frequently with soap and water    Call Your Primary Doctor If: Breathing difficulty develops or you become worse.    For more information about COVID19 and options for caring for yourself at home, please visit the CDC website at https://www.cdc.gov/coronavirus/2019-ncov/about/steps-when-sick.html  For more options for care at [facility], please visit our website at [email or web address]    Reasons to contact your surgeon:    1. Signs of possible infection: Check your incision daily for redness, swelling, warmth, red streaks or foul drainage.   2. Elevated  temperature.  3. Pain not controlled with pain medication and/or rest.   4. Uncontrolled nausea or vomiting.  5. Any questions or concerns.      **If you have questions or concerns about your procedure,   call Dr. Aviles at [phone]**

## 2021-02-08 NOTE — OP NOTE
Upper GI Endoscopy 02/08/2021  7:00 AM Rad Rslts   [facility]   _______________________________________________________________________________   Patient Name: Nathalie Bashir           Procedure Date: 2/8/2021 7:00 AM   MRN: [id number]                       Account Number: [id number]   YOB: 1965             Admit Type: Outpatient   Age: 55                               Room: OR   Note Status: Finalized                Attending MD: Jeet Aviles MD   Total Sedation Time:                  Instrument Name: 402 GIF- Gastroscope   _______________________________________________________________________________       Procedure:                Upper GI endoscopy   Indications:              Stent follow-up, Malnutrition   Providers:                Jeet Aviles MD, Kayleen Barreto RN, Kesha Lester   Patient Profile:          Ms Bashir is a 54yo woman with a history of                             incidental gastric adenocarcinoma found at the time                             of a Whipple for a pancreatic cyst prompting                             partial gastrectomy with RY who then struggled with                             oral intake. An upper endoscopy a couple monthas                             back found a tortuous and mildly stenotic proximal                             David and this was managed with placement of a                             [id number] Endomaxx. She notes improved oral intake                             and she follows for evaluation and stent exchange                             or removal.   Referring MD:             Marianne Gonzalez   Requesting Provider:      Yandel Mallory   Medicines:                General Anesthesia   Complications:            No immediate complications.   _______________________________________________________________________________   Procedure:                Pre-Anesthesia  Assessment:                             - Prior to the procedure, a History and Physical                             was performed, and patient medications and                             allergies were reviewed. The patient is competent.                             The risks and benefits of the procedure and the                             sedation options and risks were discussed with the                             patient. All questions were answered and informed                             consent was obtained. Patient identification and                             proposed procedure were verified by the nurse in                             the pre-procedure area. Mental Status Examination:                             alert and oriented. Airway Examination: normal                             oropharyngeal airway and neck mobility. Respiratory                             Examination: clear to auscultation. CV Examination:                             normal. ASA Grade Assessment: III - A patient with                             severe systemic disease. After reviewing the risks                             and benefits, the patient was deemed in                             satisfactory condition to undergo the procedure.                             The anesthesia plan was to use general anesthesia.                             Immediately prior to administration of medications,                             the patient was re-assessed for adequacy to receive                             sedatives. The heart rate, respiratory rate, oxygen                             saturations, blood pressure, adequacy of pulmonary                             ventilation, and response to care were monitored                             throughout the procedure. The physical status of                             the patient was re-assessed after the procedure.                             After obtaining informed consent, the endoscope  was                             passed under direct vision. Throughout the                             procedure, the patient's blood pressure, pulse, and                             oxygen saturations were monitored continuously. The                             adult gastroscope was introduced through the mouth,                             and advanced to the jejunum. The upper GI endoscopy                             was accomplished without difficulty. The patient                             tolerated the procedure well.                                                                                     Findings:        The patient was supine and an adult gastrosccope used throughout.         films demonstrated the metal stent in its anticipated position.        Endoscopically, the proximal, mid and distal esophagus were unremarkable        with the squamocolumnar line seen at the gastroesophageal junction        without significant irregularity. The remnant stomach was generous for a        pouch and contained partially digested food along with a widely        expanded, somewhat off axis covered metal stent which bridged the        gastrojejunostomy and the proximal David. The stent was grasped at a        purse string using a rat toothed foreceps and removed with minimal        trauma. Relook endoscopy demonstrated a now widely patent David orifice        with an associated superficial ulceration. A decision was made for a        stent free trial.                                                                                     Impression:               - David en Y anatomy with generous pouch and stent                             bridging the GJ and proximal David                             - Uncomplicated removal of stent with relook                             endoscopy sugegsting widely patent prioximal David                             and associated superficial ulceration   Recommendation:           -  General anesthesia recovery with probable                             discharge home this morning                             - A diet may resume upon discharge as may all                             medications                             - Follow up with Marianne Gonzalez as scheduled                             - Contact our team with recurrent issues of food                             tolerance                             - Initiate/continue once daily PPI for at least                             another 30 days (also prescribed)                             - The findings and recommendations were discussed                             with the patient and their family

## 2021-02-08 NOTE — ANESTHESIA PROCEDURE NOTES
Airway   Date/Time: 2/8/2021 7:32 AM   Patient location during procedure: OR  Staff -   Performed By: anesthesiologist, CRNA and SRNA    Consent for Airway   Urgency: elective    Indications and Patient Condition  Indications for airway management: sylvia-procedural  Induction type:RSIMask difficulty assessment: 0 - not attempted    Final Airway Details  Final airway type: endotracheal airway  Successful airway:ETT - single  Endotracheal Airway Details   ETT size (mm): 7.0  Cuffed: yes  Successful intubation technique: direct laryngoscopy  Grade View of Cords: 1  Adjucts: stylet  Measured from: lips  Secured at (cm): 24  Secured with: silk tape  Bite block used: None    Post intubation assessment   Placement verified by: capnometry, equal breath sounds and chest rise   Number of attempts at approach: 1  Number of other approaches attempted: 0  Secured with:silk tape  Ease of procedure: easy  Dentition: Intact and Unchanged

## 2021-02-08 NOTE — ANESTHESIA CARE TRANSFER NOTE
Patient: Nathalie Bashir    Procedure(s):  ESOPHAGOGASTRODUODENOSCOPY (EGD)AND STENT REMOVAL    Diagnosis: History of gastric surgery [Z98.890]  Diagnosis Additional Information: No value filed.    Anesthesia Type:   General     Note:    Oropharynx: oropharynx clear of all foreign objects and spontaneously breathing  Level of Consciousness: awake  Oxygen Supplementation: face mask  Level of Supplemental Oxygen (L/min / FiO2): 6  Independent Airway: airway patency satisfactory and stable  Dentition: dentition unchanged  Vital Signs Stable: post-procedure vital signs reviewed and stable  Report to RN Given: handoff report given  Patient transferred to: PACU  Comments: Pt to PACU with O2 via mask, airway patent, VSS. Report to RN.  Handoff Report: Identifed the Patient, Identified the Reponsible Provider, Reviewed the pertinent medical history, Discussed the surgical course, Reviewed Intra-OP anesthesia mangement and issues during anesthesia, Set expectations for post-procedure period and Allowed opportunity for questions and acknowledgement of understanding      Vitals: (Last set prior to Anesthesia Care Transfer)  CRNA VITALS  2/8/2021 0738 - 2/8/2021 0813      2/8/2021             Pulse:  108    SpO2:  99 %    Resp Rate (observed):  (!) 3        Electronically Signed By: NINA New CRNA  February 8, 2021  8:13 AM

## 2021-02-08 NOTE — OR NURSING
EGD with Esophageal stent pull done in OR 24 w/ Dr. Aviles. No specimens obtained and sedation per anesthesia.

## 2021-02-09 NOTE — ANESTHESIA POSTPROCEDURE EVALUATION
Patient: Nathalie Bashir    Procedure(s):  ESOPHAGOGASTRODUODENOSCOPY (EGD)AND STENT REMOVAL    Diagnosis:History of gastric surgery [Z98.890]  Diagnosis Additional Information: No value filed.    Anesthesia Type:  General    Note:  Disposition: Outpatient   Postop Pain Control: Uneventful            Sign Out: Well controlled pain   PONV: No   Neuro/Psych: Uneventful            Sign Out: Acceptable/Baseline neuro status   Airway/Respiratory: Uneventful            Sign Out: Acceptable/Baseline resp. status   CV/Hemodynamics: Uneventful            Sign Out: Acceptable CV status   Other NRE: NONE   DID A NON-ROUTINE EVENT OCCUR? No    Event details/Postop Comments:  Pt doing well prior to discharge home.           Last vitals:  Vitals:    02/08/21 0830 02/08/21 0845 02/08/21 0921   BP: 114/77 110/81 114/64   Pulse: 96 93    Resp: 9 14 14   Temp:  36.6  C (97.8  F) 37  C (98.6  F)   SpO2: 97% 99% 100%       Last vitals prior to Anesthesia Care Transfer:  CRNA VITALS  2/8/2021 0738 - 2/8/2021 0838      2/8/2021             NIBP:  117/77    Pulse:  98    SpO2:  100 %    Resp Rate (observed):  12    EKG:  Sinus rhythm          Electronically Signed By: Vahid Hart MD  February 8, 2021  10:43 PM

## 2021-02-09 NOTE — INTERVAL H&P NOTE
I have reviewed the surgical (or preoperative) H&P that is linked to this encounter, and examined the patient. There are no significant changes   Patient on room air. Lung sounds coarse. SpO2 in the mid 90's.  Neb tx given as ordered. Will continue to follow.    Braydon MOELLER

## 2021-02-12 ENCOUNTER — PATIENT OUTREACH (OUTPATIENT)
Dept: GASTROENTEROLOGY | Facility: CLINIC | Age: 56
End: 2021-02-12

## 2021-02-12 NOTE — TELEPHONE ENCOUNTER
Post Egd (2/8/21) with Dr. Aviles: Follow-up    Post procedure recommendations:   Follow up with Marianne Gonzalez as scheduled     - Contact our team with recurrent issues of food tolerance     - Initiate/continue once daily PPI for at least another 30 days (also prescribed)     Orders placed: none    Patient states: pt feeling better, no urge/sensation of vomiting. Throat still sore but not concerning. Understands follow up plan.    Clinic contact and scheduling numbers verified for future questions/concerns.    Sofia George RN Care Coordinator

## 2021-02-15 ENCOUNTER — MYC REFILL (OUTPATIENT)
Dept: PALLIATIVE CARE | Facility: CLINIC | Age: 56
End: 2021-02-15

## 2021-02-15 DIAGNOSIS — G89.18 POSTOPERATIVE PAIN: ICD-10-CM

## 2021-02-15 NOTE — TELEPHONE ENCOUNTER
Received mychart from patient requesting refill of oxycodone.     Last refill: 2/3/2021  Last office visit: 1/7/2021  Scheduled for follow up 3/11/2021     Will route request to MD for review.     Reviewed [place]  Report.

## 2021-02-16 RX ORDER — OXYCODONE HYDROCHLORIDE 5 MG/1
5 TABLET ORAL 3 TIMES DAILY PRN
Qty: 42 TABLET | Refills: 0 | Status: SHIPPED | OUTPATIENT
Start: 2021-02-17 | End: 2021-03-01

## 2021-03-01 ENCOUNTER — MYC REFILL (OUTPATIENT)
Dept: PALLIATIVE CARE | Facility: CLINIC | Age: 56
End: 2021-03-01

## 2021-03-01 DIAGNOSIS — G89.18 POSTOPERATIVE PAIN: ICD-10-CM

## 2021-03-01 RX ORDER — OXYCODONE HYDROCHLORIDE 5 MG/1
5 TABLET ORAL 3 TIMES DAILY PRN
Qty: 42 TABLET | Refills: 0 | Status: SHIPPED | OUTPATIENT
Start: 2021-03-03 | End: 2021-03-15

## 2021-03-01 NOTE — TELEPHONE ENCOUNTER
Received mychart from patient requesting refill of oxycodone.     Last refill: 2/17/2021  Last office visit: 1/7/2021  Scheduled for follow up 3/11/2021     Will route request to MD for review.     Reviewed [place]  Report.

## 2021-03-04 ENCOUNTER — VIRTUAL VISIT (OUTPATIENT)
Dept: PALLIATIVE CARE | Facility: CLINIC | Age: 56
End: 2021-03-04
Attending: INTERNAL MEDICINE
Payer: MEDICARE

## 2021-03-04 DIAGNOSIS — G62.9 NEUROPATHY: Primary | ICD-10-CM

## 2021-03-04 PROCEDURE — 99214 OFFICE O/P EST MOD 30 MIN: CPT | Mod: 95 | Performed by: FAMILY MEDICINE

## 2021-03-04 PROCEDURE — 999N001193 HC VIDEO/TELEPHONE VISIT; NO CHARGE

## 2021-03-04 NOTE — PROGRESS NOTES
"Nathalie is a 55 year old who is being evaluated via a billable video visit.      How would you like to obtain your AVS? MyChart  If the video visit is dropped, the invitation should be resent by: Text to cell phone: [phone]  Will anyone else be joining your video visit? No      Vitals - Patient Reported  Weight (Patient Reported): 68.9 kg (152 lb)  Height (Patient Reported): 174 cm (5' 8.5\")  BMI (Based on Pt Reported Ht/Wt): 22.78  Pain Score: Moderate Pain (4)  Pain Loc: Foot      I have reviewed and updated patient's allergy and medication list.    Concerns: NONE  Refills: NONE      Gillian Gonzalez CMA    Video Start Time: 2:01 PM  Video-Visit Details    Type of service:  Video Visit    Video End Time:2:32 PM    Originating Location (pt. Location): Home    Distant Location (provider location):  [facility]     Platform used for Video Visit: [facility]     Palliative Care Outpatient Clinic Progress Note    Patient Name:  Nathalie Bashir  Primary Provider:  Marianne Gonzalez    Chief Complaint: follow-up symptom management    Interim History:  Nathalie Bashir is a 55 year old female returns to be seen by palliative care today. She has a history significant for gastric adenocarcinoma found during Whipple in 1/2020 for presumed IPMN, no pancreatic malignancy found at that time. S/p neoadjuvant chemotherapy and surgery with curative intent on 10/21/20. Surgery was extensive and included partial omentectomy, resection of GJ anastomosis with en bloc subtotal gastrectomy, modified lymphadenectomy, and David-en-Y Gastrojejunostomy Reconstruction. She has since also undergone an upper GI endoscopy with stenting of the jejunum due to some mild stenosis following her surgeries noted above. She has now undergone stent removal on 2/8/2021.      At our last visit she has having some bloating, abdominal fullness, and constant feelings of nausea that she feels has improved following stent removal.  " Subjective   Patient not evaluated this AM to limit exposure of team members to Covid-19 and to preserve PPE.   Objective     I/O's    Intake/Output Summary (Last 24 hours) at 11/3/2020 1323  Last data filed at 11/3/2020 1300  Gross per 24 hour   Intake 521.62 ml   Output 600 ml   Net -78.38 ml       Last Recorded Vitals  Temp:  [96.1 °F (35.6 °C)-99.3 °F (37.4 °C)] 99.3 °F (37.4 °C)  Heart Rate:  [] 99  Resp:  [20-35] 32  BP: (126-189)/() 134/46  FiO2 (%):  [58 %-100 %] 58 %         Labs   Recent Results (from the past 24 hour(s))   Metered blood glucose    Collection Time: 11/02/20  4:52 PM   Result Value Ref Range    Glucose Bedside  (H) 70 - 99 mg/dL   Arterial Blood Gas with Cooximetry    Collection Time: 11/02/20 11:38 PM   Result Value Ref Range    PH RC Arterial 7.33 (L) 7.35 - 7.45 Units    PCO2 RC Arterial 40 35 - 48 mm Hg    PO2 RC Arterial 70 (L) 83 - 108 mm Hg    HCO3 RC Arterial 21 (L) 22 - 28 mmol/L    Base Deficit RC Arterial 5 (H) 0 - 2 mmol/L    O2 SAT RC Arterial 97 95 - 99 %    Temperature RC 35.7 degrees    Site RC RADIAL, RIGHT     CONDITION RC PRVC RR 35  PEEP 10 FIO2 65 DIONNA 20ppm     Carbon Monoxide RC 1.7 (H) <1.5 %    O2 Content RC Arterial 12 (L) 15 - 23 %    OXY HGB RC 94.9 94 - 98 %    Methemoglobin RC Mixed Venous 0.4 <1.6 %    Hemoglobin RC 8.7 (L) 13.0 - 17.0 g/dL    PAO2 / FIO2 Ratio 117 (L) 300 - 500   Potassium (performed by respiratory)    Collection Time: 11/02/20 11:38 PM   Result Value Ref Range    Potassium RC 4.7 3.4 - 5.1 mmol/L   DRVVT CONFIRM    Collection Time: 11/02/20 11:38 PM   Result Value Ref Range    Sodium  135 - 145 mmol/L   IONIZED CALCIUM-RC    Collection Time: 11/02/20 11:38 PM   Result Value Ref Range    CALCIUM IONIZED RC 1.12 (L) 1.15 - 1.29 mmol/L   Arterial Lactic Acid-RC    Collection Time: 11/02/20 11:38 PM   Result Value Ref Range    Lactic Acid Arterial RC 0.8 <1.6 mmol/L   Metered blood glucose    Collection Time:  "Following stent removal she was placed on protonix x1 month.  She had recently discontinued this, and shortly after developed symptoms of nausea, reflux/heartburn and frequent burping. She recently saw her primary care provider who restarted the protonix with again notable improvement in her symptoms.  She feels that coffee is a major trigger for her reflux and is going to try to cut back or stop drinking coffee.     She continues to have ongoing pain and discomfort associated with neuropathy.  She feels that her symptoms worsen throughout the day the longer she is on her feet.  She feels like they get \"numby & sore\" as the day goes on.  She has been trying to be more active and walk more throughout the day. She continues to take oxycodone 5 mg TID PRN for this.  At her last visit we increased her amitriptyline to 100 mg at bedtime.  She felt that this did not significantly improve her neuropathy symptoms, but did make her very tired so she decreased the dosage back to 50 mg at bedtime. Her oncologist mentioned laser therapy for neuropathy which she is considering after more research. She had also been having some cramping in her legs and her primary care provider started her on flexeril which has been helpful for her.     Finally, she notes that she has some significant osteoarthritis in her foot where it is \"bone on bone.\" She reports she had a planned surgery that was delayed because of covid and because of her cancer directed treatments including chemotherapy and surgery.  She is wondering whether she should have this surgery now.     Coping:  Nathalie reports she is coping well, she is encouraged that she is slowly feeling better with time.     Social History:  Pertinent changes to social history/social situation since last visit: None  Key support resources:  Ti  Advance Directive Status:  Not on file  Social History     Tobacco Use     Smoking status: Former Smoker     Packs/day: 0.50     Years: " 11/02/20 11:49 PM   Result Value Ref Range    Glucose Bedside  (H) 70 - 99 mg/dL   Tacrolimus    Collection Time: 11/03/20  3:00 AM   Result Value Ref Range    TACROLIMUS 1.8 (L) 5.0 - 20.0 ng/mL   CBC with Automated Differential    Collection Time: 11/03/20  3:00 AM   Result Value Ref Range    WBC 24.0 (H) 4.2 - 11.0 K/mcL    RBC 2.64 (L) 4.50 - 5.90 mil/mcL    HGB 7.7 (L) 13.0 - 17.0 g/dL    HCT 24.7 (L) 39.0 - 51.0 %    MCV 93.6 78.0 - 100.0 fl    MCH 29.2 26.0 - 34.0 pg    MCHC 31.2 (L) 32.0 - 36.5 g/dL    RDW-CV 13.9 11.0 - 15.0 %    PLT 55 (L) 140 - 450 K/mcL    NRBC 0 0 /100 WBC    DIFF TYPE AUTOMATED DIFFERENTIAL     Neutrophil 95 %    LYMPH 2 %    MONO 2 %    EOSIN 0 %    BASO 0 %    Percent Immature Granuloctyes 1 %    Absolute Neutrophil 22.7 (H) 1.8 - 7.7 K/mcL    Absolute Lymph 0.4 (L) 1.0 - 4.0 K/mcL    Absolute Mono 0.6 0.3 - 0.9 K/mcL    Absolute Eos 0.0 (L) 0.1 - 0.5 K/mcL    Absolute Baso 0.0 0.0 - 0.3 K/mcL    Absolute Immature Granulocytes 0.3 (H) 0 - 0.2 K/mcl   Comprehensive Metabolic Panel    Collection Time: 11/03/20  3:00 AM   Result Value Ref Range    Sodium 139 135 - 145 mmol/L    Potassium 4.8 3.4 - 5.1 mmol/L    Chloride 101 98 - 107 mmol/L    Carbon Dioxide 24 21 - 32 mmol/L    Anion Gap 19 10 - 20 mmol/L    Glucose 111 (H) 65 - 99 mg/dL     (H) 6 - 20 mg/dL    Creatinine 4.28 (H) 0.67 - 1.17 mg/dL    GFR Estimate,  17     GFR Estimate, Non African American 15     BUN/Creatinine Ratio 32 (H) 7 - 25    CALCIUM 8.3 (L) 8.4 - 10.2 mg/dL    TOTAL BILIRUBIN 0.5 0.2 - 1.0 mg/dL    AST/SGOT 120 (H) <38 Units/L    ALT/SGPT 153 (H) <64 Units/L    ALK PHOSPHATASE 58 45 - 117 Units/L    TOTAL PROTEIN 5.5 (L) 6.4 - 8.2 g/dL    Albumin 2.1 (L) 3.6 - 5.1 g/dL    GLOBULIN 3.4 2.0 - 4.0 g/dL    A/G Ratio, Serum 0.6 (L) 1.0 - 2.4   Magnesium    Collection Time: 11/03/20  3:00 AM   Result Value Ref Range    MAGNESIUM 2.7 (H) 1.7 - 2.4 mg/dL   Phosphorus    Collection Time:  11/03/20  3:00 AM   Result Value Ref Range    PHOSPHORUS 8.6 (H) 2.4 - 4.7 mg/dL   Metered blood glucose    Collection Time: 11/03/20 11:44 AM   Result Value Ref Range    Glucose Bedside  (H) 70 - 99 mg/dL       Imaging  XR CHEST PA OR AP 1 VIEW   Final Result   Central congestion.  Extensive interstitial opacities.  Prosthetic cardiac valve.  Small bilateral pleural effusions.  Bibasilar atelectasis.  No pneumothorax is seen.        Electronically Signed by: ANTWON ROMANO MD    Signed on: 11/2/2020 7:42 PM          US LIVER   Final Result   1. Hepatic steatosis.     2.  Biliary sludge.  No sonographic evidence of acute cholecystitis.         Thank you for the opportunity to participate in the care of your patient. Please contact us if we can be of further assistance.         Electronically Signed by: TRINA FLORES M.D.    Signed on: 11/2/2020 5:28 PM          US VASC EXTREMITY LOWER DUPLEX ARTERIAL   Final Result   Impression: No hemodynamically significant stenosis of the right lower extremity      Electronically Signed by: TRINA FLORES M.D.    Signed on: 11/2/2020 5:26 PM          XR CHEST PA OR AP 1 VIEW   Final Result   Impression:    Placement of a left IJ central venous catheter with the tip terminating at the cavoatrial junction.      Right IJ double-lumen catheter tip likely terminates at the confluence of the right IJ and subclavian vein.      No significant change in diffuse, bilateral interstitial and patchy airspace opacities.      Electronically Signed by: GLENROY KIM MD    Signed on: 11/2/2020 12:52 PM          XR CHEST AP OR PA 1 VIEW   Final Result   Impression:    Progression of airspace opacities throughout the lungs.       Support lines as described.      Electronically Signed by: VANE ARAMBULA MD    Signed on: 11/1/2020 9:06 PM          XR CHEST AP OR PA 1 VIEW   Final Result   FINDINGS/IMPRESSION:        The endotracheal tube is above bindu in place. Feeding tube is below the  25.00     Pack years: 12.50     Types: Cigarettes     Quit date: 2020     Years since quittin.1     Smokeless tobacco: Never Used   Substance Use Topics     Alcohol use: No     Drug use: No         Allergies   Allergen Reactions     Gluten Meal Palpitations     Augmentin Rash     Patient tolerated Zosyn in 2020     Oxycontin [Oxycodone]      Confusion, loopy, oxycodone is tolerated     Pregabalin      interfered with Cymbalta     Topiramate      Tongue numbness, taste alteration, irritable      Acetaminophen Nausea     Urine retention       Dust Mite Extract      Gabapentin Dizziness and GI Disturbance     Ketorolac Headache     Latex Rash     Methadone Fatigue     Mold      Naprosyn [Naproxen] Dizziness and GI Disturbance     Seasonal Allergies      Current Outpatient Medications   Medication Sig Dispense Refill     amitriptyline (ELAVIL) 100 MG tablet Take 1 tablet (100 mg) by mouth At Bedtime 30 tablet 3     amylase-lipase-protease (CREON 24) 30573-18165 units CPEP per EC capsule 2 capsules with meals and 1 capsule with snacks 240 capsule 4     calcium carbonate 500 mg, elemental, (OSCAL 500) 1250 (500 Ca) MG TABS tablet Take by mouth 3 times daily       citalopram (CELEXA) 20 MG tablet Take 20 mg by mouth every morning        Cyanocobalamin 1000 MCG CAPS Take by mouth daily       cyclobenzaprine (FLEXERIL) 10 MG tablet Take 10 mg by mouth 3 times daily as needed for muscle spasms       famotidine (PEPCID) 20 MG tablet Take 20 mg by mouth 2 times daily as needed        fluticasone (FLONASE) 50 MCG/ACT nasal spray Spray 1 spray into both nostrils daily       hydrOXYzine (VISTARIL) 25 MG capsule Take 25-50 mg by mouth 4 times daily as needed for itching       ibuprofen (ADVIL/MOTRIN) 600 MG tablet Take 1 tablet (600 mg) by mouth every 6 hours as needed for moderate pain 50 tablet 0     lidocaine (XYLOCAINE) 2 % solution Swish and swallow 15 mLs in mouth every 3 hours as needed for moderate pain ; Max 8  doses/24 hour period. 100 mL 0     loperamide (IMODIUM) 2 MG capsule Take 4 mg by mouth 4 times daily as needed for diarrhea        loratadine-pseudoePHEDrine (CLARITIN-D 24-HOUR)  MG 24 hr tablet Take 1 tablet by mouth daily as needed        magnesium 250 MG tablet Take 1 tablet by mouth daily       metoclopramide (REGLAN) 5 MG tablet Take 5 mg by mouth 2 times daily as needed       multivitamin w/minerals (MULTI-VITAMIN) tablet Take 1 tablet by mouth daily       ondansetron (ZOFRAN) 8 MG tablet Take 1 tablet (8 mg) by mouth every 8 hours as needed (Nausea/Vomiting) 90 tablet 2     oxyCODONE (ROXICODONE) 5 MG tablet Take 1 tablet (5 mg) by mouth 3 times daily as needed for moderate to severe pain Maximum 3 pills/day. 42 tablet 0     pantoprazole (PROTONIX) 40 MG EC tablet Take 1 tablet (40 mg) by mouth 2 times daily 60 tablet 11     potassium chloride ER (KLOR-CON M) 20 MEQ CR tablet TAKE ONE TABLET BY MOUTH ONCE DAILY (Patient taking differently: daily as needed ) 30 tablet 1     prochlorperazine (COMPAZINE) 10 MG tablet Take 1 tablet (10 mg) by mouth every 6 hours as needed for nausea or vomiting 60 tablet 3     simethicone 80 MG TABS Take 1 tablet by mouth every 6 hours as needed (bloating, gas, belching) 90 tablet 3     sucralfate (CARAFATE) 1 GM tablet Take 1 tablet (1 g) by mouth 4 times daily as needed (reflux.) 360 tablet 6     SUMAtriptan (IMITREX) 100 MG tablet Take 100 mg by mouth at onset of headache for migraine       Past Medical History:   Diagnosis Date     Allergic rhinitis      Cancer (H)     stomach cancer     Chronic pain      Depression      Gastric adenocarcinoma (H)      Lumbago      Migraine      Opioid dependence (H)      Other chronic pain     low back     Sacroiliitis (H)     low back pain     Trochanteric bursitis     leg length discrrepancy, hip pain     Past Surgical History:   Procedure Laterality Date     ARTHROPLASTY HIP Left 2016     BACK SURGERY      L4-5 decompression     C  level of the diaphragm, the tip is not included in the field-of-view. Interval worsening of extensive bilateral interstitial and airspace opacities. Probable small pleural    effusions. No gross pneumothorax. Evidence of prior CABG. Cardiomediastinal silhouette appears to be grossly within normal limits.      Electronically Signed by: YOON YBARRA M.D.    Signed on: 11/1/2020 12:05 PM          US VASC EXTREMITY LOWER VENOUS DUPLEX   Final Result      1.   No ultrasound evidence of deep venous thrombosis.         Electronically Signed by: GLENROY KIM MD    Signed on: 10/30/2020 1:02 PM          US VAS EXTREMITY UPPER VENOUS DUPLEX   Final Result   No deep venous thrombosis of the left upper extremity PICC.   There is deep venous thrombosis extensively in the right upper extremity.      Electronically Signed by: REBEL URIBE M.D.    Signed on: 10/30/2020 1:14 PM          US VAS EXTREMITY LOWER DUPLEX ARTERIAL   Final Result   Nonvisualization of the left dorsalis pedis artery and peroneal artery.  Occlusion of these arteries therefore cannot be excluded.  Consider correlation with a lower extremity CT a runoff.      Electronically Signed by: JEFFERY ISIDRO M.D.   Signed on: 10/30/2020 06:49 AM      XR CHEST PA OR AP 1 VIEW   Final Result   Mild central congestion.  Bilateral lung interstitial airspace opacities.  Right costophrenic angle is sharp.  Small left pleural effusion.        Electronically Signed by: ANTWON ROMANO MD    Signed on: 10/29/2020 8:42 AM          XR CHEST PA OR AP 1 VIEW   Final Result   Impression:    Slight progression of patchy airspace opacification predominantly on the left. Support lines as described.      Electronically Signed by: VANE ARAMBULA MD    Signed on: 10/28/2020 11:34 PM          XR CHEST PA OR AP 1 VIEW   Final Result   FINDINGS/IMPRESSION:        The endotracheal tube is above bindu in place.  Feeding tube courses through the mid chest, the distal tip is below the level of the  REPAIR DETACH RETINA,SCLERAL BUCKLE       CATARACT IOL, RT/LT       CHOLECYSTECTOMY N/A 1/28/2020    Procedure: Cholecystectomy;  Surgeon: Yandel Mallory MD;  Location: [facility] OR     ENDOSCOPIC RETROGRADE CHOLANGIOPANCREATOGRAM N/A 7/27/2020    Procedure: ENDOSCOPIC RETROGRADE CHOLANGIOPANCREATOGRAPHY  **Latex Allergy** with jejunal biopsies;  Surgeon: Jeet Aviles MD;  Location:  OR     ESOPHAGOSCOPY, GASTROSCOPY, DUODENOSCOPY (EGD), COMBINED N/A 3/3/2020    Procedure: ESOPHAGOGASTRODUODENOSCOPY, WITH ENDOSCOPIC US (enoxaparin);  Surgeon: Bakari Plata MD;  Location:  GI     ESOPHAGOSCOPY, GASTROSCOPY, DUODENOSCOPY (EGD), COMBINED N/A 12/7/2020    Procedure: Upper Endoscopy with stent placement;  Surgeon: Jeet Aviles MD;  Location:  OR     ESOPHAGOSCOPY, GASTROSCOPY, DUODENOSCOPY (EGD), COMBINED N/A 2/8/2021    Procedure: ESOPHAGOGASTRODUODENOSCOPY (EGD)AND STENT REMOVAL;  Surgeon: Jeet Aviles MD;  Location:  OR     EYE SURGERY       GASTRECTOMY N/A 10/21/2020    Procedure: Open subtotal gastectomy with David en Y Reconstruction; D1 Lymph Node Disection  **Latex Allergy**;  Surgeon: Yandel Mallory MD;  [facility]     IR CHEST PORT PLACEMENT > 5 YRS OF AGE  3/26/2020     LAPAROSCOPY DIAGNOSTIC (GENERAL) N/A 10/13/2020    Procedure: Diagnostic laparoscopy with peritoneal washings  **Latex Allergy**;  Surgeon: Yandel Mallory MD;  Location: [facility] OR     OPEN REDUCTION INTERNAL FIXATION RODDING INTRAMEDULLARY FEMUR Left 12/23/2014    Procedure: OPEN REDUCTION INTERNAL FIXATION RODDING INTRAMEDULLARY FEMUR;  Surgeon: Arnol Santiago MD;  Location:  OR     ORTHOPEDIC SURGERY       PICC DOUBLE LUMEN PLACEMENT Right 02/07/2020    5 fr double lumen 41 cm     REMOVE HARDWARE LOWER EXTREMITY Left 4/7/2015    Procedure: REMOVE HARDWARE LOWER EXTREMITY;  Surgeon: Arnol Santiago MD;  Location: [facility] OR     REMOVE HARDWARE RODDING INTRAMEDULLARY FEMUR Left 12/17/2015    Procedure:  diaphragm although not included in the field-of-view.  There is a right-sided central line catheter with the tip in the    proximal SVC, in place.  Evidence of prior CABG.  Again noted are bilateral patchy interstitial airspace opacities in keeping with atypical viral pneumonia such as Covid 19 pneumonia.  There is no pleural effusion or pneumothorax.  EKG leads overlying    the chest.      Electronically Signed by: YOON YBARRA M.D.    Signed on: 10/28/2020 12:43 PM          XR CHEST PA OR AP 1 VIEW   Final Result   Bilateral interstitial and alveolar infiltrates, left greater than right.      Electronically Signed by: DIANNA VAUGHN M.D.   Signed on: 10/28/2020 04:37 AM      XR CHEST AP OR PA 1 VIEW   Final Result   FINDINGS/IMPRESSION:      Tip of the ET tube projects 3.6 cm above the bindu.  NG tube is in place.  Tip of a right-sided PICC line projects at the cavoatrial junction.      Bilateral airspace opacities noted suspicious for multifocal pneumonia.  No apparent large pleural effusion or pneumothorax.  Heart is mild to moderately enlarged.      Electronically Signed by: JEMMA ORTEGA MD    Signed on: 10/24/2020 7:23 PM          XR CHEST PA OR AP 1 VIEW    (Results Pending)       Assessment & Plan   54-year-old male with a history of IgA nephropathy with a kidney transplant in 2008, biologic aortic valve replacement, heart failure and coronary artery disease status post CABG who was admitted with Covid pneumonia and acute hypoxic respiratory failure. Vascular consulted for right foot mottling and discoloration. Remains palpable on right foot, although both feet cool to touch. No vascular intervention as patient is macro-vascularly intact. Mottling and skin changes most likely due to Covid hypercoaguable state. Vascular surgery to sign off. Please call for any further acute vascular exam changes. Continue supportive care per primary team.     Patient seen and discussed with attending physician   En.     Lurdes Blanco, PGY1  Vascular Surgery   Please perfectserve vascular surgery intern with any questions                       REMOVE HARDWARE RODDING INTRAMEDULLARY FEMUR;  Surgeon: Arnol Santiago MD;  Location: [facility] OR     RESECT BONE LOWER EXTREMITY Left 12/23/2014    Procedure: RESECT BONE LOWER EXTREMITY;  Surgeon: Arnol Santiago MD;  Location: [facility] OR     WHIPPLE PROCEDURE N/A 1/28/2020    Procedure: Open Whipple procedure and Cholecystectomy;  Surgeon: Yandel Mallory MD;  Location: [facility] OR     WHIPPLE PROCEDURE       Review of Systems:   ROS: 10 point ROS neg other than the symptoms noted above in the HPI and pertinents here:  Palliative Symptom Review (0=no symptom/no concern, 1=mild, 2=moderate, 3=severe):      Pain: 1-2      Fatigue: 1      Nausea: 1      Constipation: 0      Diarrhea: 0      Depressive Symptoms: 0      Anxiety: 1      Drowsiness: 0      Poor Appetite: 0      Shortness of Breath: 0      Insomnia: 0      Other: 0      Overall (0 good/no concerns, 3 very poor):  1           Physical Exam:   Nathalie was seen by video visit today.  She is resting comfortably sitting on her couch at time of evaluation.  In no acute distress.  Non-labored breathing. Alert, interactive, responds to all questions appropriately.  Normal mood & affect.     Key Data Reviewed:  LABS: No recent lab work  IMAGING: Reviewed images from recent jejunal stent removal     Impression & Recommendations & Counseling:  Nathalie Bashir is a 55 year old female with a history of gastric adenocarcinoma found during Whipple in 1/2020 for presumed IPMN, no pancreatic malignancy found at that time now s/p neoadjuvant chemotherapy and surgery with curative intent on 10/21/20 s/p upper GI endoscopy with stenting of the jejunum due to some mild stenosis following her surgeries, now s/p stent removal on 2/8/2021. She was evaluated today primarily symptom management in regards to her abdominal discomfort and peripheral neuropathy.     She feels her symptoms are currently fairly well controlled in regards to her nausea, reflux and abdominal discomfort following  stent removal with continuation of protonix. In terms of her neuropathy, we reviewed that we are in a difficult spot for medication management as an opioid escalation is not recommended, she did not find an increase in amitriptyline to be beneficial, and she has several noted medication allergies/intolerances including gabapentin, pregabalin, and methadone. She is interested in researching laser therapy as recommended by her oncologist.  I told her I do not have additional information on this, but that it would likely be very low risk if it was something she wanted to pursue.  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 She will follow-up in 2-3 months, or sooner if needed.     Al Marie DO  Palliative Medicine Fellow  Staffed with Dr. Scott    Attending attestation:   Patient seen and evaluated with Dr. Marie and I agree with findings and recs in this note.   Thank you for involving us in the patient's care.   Precious Scott MD / Palliative Medicine / Pager [phone] / After-Hours Answering Service [phone] / Main Palliative Clinic - [facility] [phone] / [facility] Inpatient Team Consult Pager [phone] (answered 8am-430pm M-F)

## 2021-03-04 NOTE — PATIENT INSTRUCTIONS
Look here for news about when you can sign up for a coronavirus vaccine:  [email or web address]    Or through the [place] Dept of Health:  https://[place].gov/[place]id19[place]vaccine/find-vaccine/index.jsp

## 2021-03-04 NOTE — LETTER
"3/4/2021       RE: Nathalie Bashir  [address] [facility] [address]     Dear Colleague,    Thank you for referring your patient, Nathalie Bashir, to the [facility] CANCER [facility]IC at [facility]. Please see a copy of my visit note below.    Nathalie is a 55 year old who is being evaluated via a billable video visit.      How would you like to obtain your AVS? MyChart  If the video visit is dropped, the invitation should be resent by: Text to cell phone: [phone]  Will anyone else be joining your video visit? No      Vitals - Patient Reported  Weight (Patient Reported): 68.9 kg (152 lb)  Height (Patient Reported): 174 cm (5' 8.5\")  BMI (Based on Pt Reported Ht/Wt): 22.78  Pain Score: Moderate Pain (4)  Pain Loc: Foot    I have reviewed and updated patient's allergy and medication list.    Concerns: NONE  Refills: NONE      Gillian Gonzalez CMA    Video-Visit Details    Type of service:  Video Visit    Video Start Time: 2:01 PM  Video End Time:2:32 PM    Originating Location (pt. Location): Home    Distant Location (provider location):  [facility] CANCER [facility]     Platform used for Video Visit: [facility]     Palliative Care Outpatient Clinic Progress Note    Patient Name:  Nathalie Bashir  Primary Provider:  Marianne Gonzalez    Chief Complaint: follow-up symptom management    Interim History:  Nathalie Bashir is a 55 year old female returns to be seen by palliative care today. She has a history significant for gastric adenocarcinoma found during Whipple in 1/2020 for presumed IPMN, no pancreatic malignancy found at that time. S/p neoadjuvant chemotherapy and surgery with curative intent on 10/21/20. Surgery was extensive and included partial omentectomy, resection of GJ anastomosis with en bloc subtotal gastrectomy, modified lymphadenectomy, and David-en-Y Gastrojejunostomy Reconstruction. She has since also undergone an upper GI endoscopy " "with stenting of the jejunum due to some mild stenosis following her surgeries noted above. She has now undergone stent removal on 2/8/2021.      At our last visit she has having some bloating, abdominal fullness, and constant feelings of nausea that she feels has improved following stent removal.  Following stent removal she was placed on protonix x1 month.  She had recently discontinued this, and shortly after developed symptoms of nausea, reflux/heartburn and frequent burping. She recently saw her primary care provider who restarted the protonix with again notable improvement in her symptoms.  She feels that coffee is a major trigger for her reflux and is going to try to cut back or stop drinking coffee.     She continues to have ongoing pain and discomfort associated with neuropathy.  She feels that her symptoms worsen throughout the day the longer she is on her feet.  She feels like they get \"numby & sore\" as the day goes on.  She has been trying to be more active and walk more throughout the day. She continues to take oxycodone 5 mg TID PRN for this.  At her last visit we increased her amitriptyline to 100 mg at bedtime.  She felt that this did not significantly improve her neuropathy symptoms, but did make her very tired so she decreased the dosage back to 50 mg at bedtime. Her oncologist mentioned laser therapy for neuropathy which she is considering after more research. She had also been having some cramping in her legs and her primary care provider started her on flexeril which has been helpful for her.     Finally, she notes that she has some significant osteoarthritis in her foot where it is \"bone on bone.\" She reports she had a planned surgery that was delayed because of covid and because of her cancer directed treatments including chemotherapy and surgery.  She is wondering whether she should have this surgery now.     Coping:  Nathalie reports she is coping well, she is encouraged that she is slowly " feeling better with time.     Social History:  Pertinent changes to social history/social situation since last visit: None  Key support resources:  Ti  Advance Directive Status:  Not on file  Social History     Tobacco Use     Smoking status: Former Smoker     Packs/day: 0.50     Years: 25.00     Pack years: 12.50     Types: Cigarettes     Quit date: 2020     Years since quittin.1     Smokeless tobacco: Never Used   Substance Use Topics     Alcohol use: No     Drug use: No         Allergies   Allergen Reactions     Gluten Meal Palpitations     Augmentin Rash     Patient tolerated Zosyn in 2020     Oxycontin [Oxycodone]      Confusion, loopy, oxycodone is tolerated     Pregabalin      interfered with Cymbalta     Topiramate      Tongue numbness, taste alteration, irritable      Acetaminophen Nausea     Urine retention       [place] Extract      Gabapentin Dizziness and GI Disturbance     Ketorolac Headache     Latex Rash     Methadone Fatigue     Mold      Naprosyn [Naproxen] Dizziness and GI Disturbance     Seasonal Allergies      Current Outpatient Medications   Medication Sig Dispense Refill     amitriptyline (ELAVIL) 100 MG tablet Take 1 tablet (100 mg) by mouth At Bedtime 30 tablet 3     amylase-lipase-protease (CREON 24) 98159-53586 units CPEP per EC capsule 2 capsules with meals and 1 capsule with snacks 240 capsule 4     calcium carbonate 500 mg, elemental, (OSCAL 500) 1250 (500 Ca) MG TABS tablet Take by mouth 3 times daily       citalopram (CELEXA) 20 MG tablet Take 20 mg by mouth every morning        Cyanocobalamin 1000 MCG CAPS Take by mouth daily       cyclobenzaprine (FLEXERIL) 10 MG tablet Take 10 mg by mouth 3 times daily as needed for muscle spasms       famotidine (PEPCID) 20 MG tablet Take 20 mg by mouth 2 times daily as needed        fluticasone (FLONASE) 50 MCG/ACT nasal spray Spray 1 spray into both nostrils daily       hydrOXYzine (VISTARIL) 25 MG capsule Take 25-50 mg by  mouth 4 times daily as needed for itching       ibuprofen (ADVIL/MOTRIN) 600 MG tablet Take 1 tablet (600 mg) by mouth every 6 hours as needed for moderate pain 50 tablet 0     lidocaine (XYLOCAINE) 2 % solution Swish and swallow 15 mLs in mouth every 3 hours as needed for moderate pain ; Max 8 doses/24 hour period. 100 mL 0     loperamide (IMODIUM) 2 MG capsule Take 4 mg by mouth 4 times daily as needed for diarrhea        loratadine-pseudoePHEDrine (CLARITIN-D 24-HOUR)  MG 24 hr tablet Take 1 tablet by mouth daily as needed        magnesium 250 MG tablet Take 1 tablet by mouth daily       metoclopramide (REGLAN) 5 MG tablet Take 5 mg by mouth 2 times daily as needed       multivitamin w/minerals (MULTI-VITAMIN) tablet Take 1 tablet by mouth daily       ondansetron (ZOFRAN) 8 MG tablet Take 1 tablet (8 mg) by mouth every 8 hours as needed (Nausea/Vomiting) 90 tablet 2     oxyCODONE (ROXICODONE) 5 MG tablet Take 1 tablet (5 mg) by mouth 3 times daily as needed for moderate to severe pain Maximum 3 pills/day. 42 tablet 0     pantoprazole (PROTONIX) 40 MG EC tablet Take 1 tablet (40 mg) by mouth 2 times daily 60 tablet 11     potassium chloride ER (KLOR-CON M) 20 MEQ CR tablet TAKE ONE TABLET BY MOUTH ONCE DAILY (Patient taking differently: daily as needed ) 30 tablet 1     prochlorperazine (COMPAZINE) 10 MG tablet Take 1 tablet (10 mg) by mouth every 6 hours as needed for nausea or vomiting 60 tablet 3     simethicone 80 MG TABS Take 1 tablet by mouth every 6 hours as needed (bloating, gas, belching) 90 tablet 3     sucralfate (CARAFATE) 1 GM tablet Take 1 tablet (1 g) by mouth 4 times daily as needed (reflux.) 360 tablet 6     SUMAtriptan (IMITREX) 100 MG tablet Take 100 mg by mouth at onset of headache for migraine       Past Medical History:   Diagnosis Date     Allergic rhinitis      Cancer (H)     stomach cancer     Chronic pain      Depression      Gastric adenocarcinoma (H)      Lumbago      Migraine       Opioid dependence (H)      Other chronic pain     low back     Sacroiliitis (H)     low back pain     Trochanteric bursitis     leg length discrrepancy, hip pain     Past Surgical History:   Procedure Laterality Date     ARTHROPLASTY HIP Left 2016     BACK SURGERY      L4-5 decompression     C REPAIR DETACH RETINA,SCLERAL BUCKLE       CATARACT IOL, RT/LT       CHOLECYSTECTOMY N/A 1/28/2020    Procedure: Cholecystectomy;  Surgeon: Yandel Mallory MD;  Location: [facility] OR     ENDOSCOPIC RETROGRADE CHOLANGIOPANCREATOGRAM N/A 7/27/2020    Procedure: ENDOSCOPIC RETROGRADE CHOLANGIOPANCREATOGRAPHY  **Latex Allergy** with jejunal biopsies;  Surgeon: Jeet Aviles MD;  Location:  OR     ESOPHAGOSCOPY, GASTROSCOPY, DUODENOSCOPY (EGD), COMBINED N/A 3/3/2020    Procedure: ESOPHAGOGASTRODUODENOSCOPY, WITH ENDOSCOPIC US (enoxaparin);  Surgeon: Bakari Plata MD;  Location:  GI     ESOPHAGOSCOPY, GASTROSCOPY, DUODENOSCOPY (EGD), COMBINED N/A 12/7/2020    Procedure: Upper Endoscopy with stent placement;  Surgeon: Jeet Aviles MD;  Location:  OR     ESOPHAGOSCOPY, GASTROSCOPY, DUODENOSCOPY (EGD), COMBINED N/A 2/8/2021    Procedure: ESOPHAGOGASTRODUODENOSCOPY (EGD)AND STENT REMOVAL;  Surgeon: Jeet Aviles MD;  Location:  OR     EYE SURGERY       GASTRECTOMY N/A 10/21/2020    Procedure: Open subtotal gastectomy with David en Y Reconstruction; D1 Lymph Node Disection  **Latex Allergy**;  Surgeon: Yandel Mallory MD;  Location:  OR     IR CHEST PORT PLACEMENT > 5 YRS OF AGE  3/26/2020     LAPAROSCOPY DIAGNOSTIC (GENERAL) N/A 10/13/2020    Procedure: Diagnostic laparoscopy with peritoneal washings  **Latex Allergy**;  Surgeon: Yandel Mallory MD;  Location: [facility] OR     OPEN REDUCTION INTERNAL FIXATION RODDING INTRAMEDULLARY FEMUR Left 12/23/2014    Procedure: OPEN REDUCTION INTERNAL FIXATION RODDING INTRAMEDULLARY FEMUR;  Surgeon: Arnol Santiago MD;  Location:  OR     ORTHOPEDIC SURGERY        PICC DOUBLE LUMEN PLACEMENT Right 02/07/2020    5 fr double lumen 41 cm     REMOVE HARDWARE LOWER EXTREMITY Left 4/7/2015    Procedure: REMOVE HARDWARE LOWER EXTREMITY;  Surgeon: Arnol Santiago MD;  Location: [facility] OR     REMOVE HARDWARE RODDING INTRAMEDULLARY FEMUR Left 12/17/2015    Procedure: REMOVE HARDWARE RODDING INTRAMEDULLARY FEMUR;  Surgeon: Arnol Santiago MD;  Location: [facility] OR     RESECT BONE LOWER EXTREMITY Left 12/23/2014    Procedure: RESECT BONE LOWER EXTREMITY;  Surgeon: Arnol Santiago MD;  Location: [facility] OR     WHIPPLE PROCEDURE N/A 1/28/2020    Procedure: Open Whipple procedure and Cholecystectomy;  Surgeon: Yandel Mallory MD;  Location: [facility] OR     WHIPPLE PROCEDURE       Review of Systems:   ROS: 10 point ROS neg other than the symptoms noted above in the HPI and pertinents here:  Palliative Symptom Review (0=no symptom/no concern, 1=mild, 2=moderate, 3=severe):      Pain: 1-2      Fatigue: 1      Nausea: 1      Constipation: 0      Diarrhea: 0      Depressive Symptoms: 0      Anxiety: 1      Drowsiness: 0      Poor Appetite: 0      Shortness of Breath: 0      Insomnia: 0      Other: 0      Overall (0 good/no concerns, 3 very poor):  1           Physical Exam:   Nathalie was seen by video visit today.  She is resting comfortably sitting on her couch at time of evaluation.  In no acute distress.  Non-labored breathing. Alert, interactive, responds to all questions appropriately.  Normal mood & affect.     Key Data Reviewed:  LABS: No recent lab work  IMAGING: Reviewed images from recent jejunal stent removal     Impression & Recommendations & Counseling:  Nathalie Bashir is a 55 year old female with a history of gastric adenocarcinoma found during Whipple in 1/2020 for presumed IPMN, no pancreatic malignancy found at that time now s/p neoadjuvant chemotherapy and surgery with curative intent on 10/21/20 s/p upper GI endoscopy with stenting of the jejunum due to some mild stenosis following  her surgeries, now s/p stent removal on 2/8/2021. She was evaluated today primarily symptom management in regards to her abdominal discomfort and peripheral neuropathy.     She feels her symptoms are currently fairly well controlled in regards to her nausea, reflux and abdominal discomfort following stent removal with continuation of protonix. In terms of her neuropathy, we reviewed that we are in a difficult spot for medication management as an opioid escalation is not recommended, she did not find an increase in amitriptyline to be beneficial, and she has several noted medication allergies/intolerances including gabapentin, pregabalin, and methadone. She is interested in researching laser therapy as recommended by her oncologist.  I told her I do not have additional information on this, but that it would likely be very low risk if it was something she wanted to pursue.  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 She will follow-up in 2-3 months, or sooner if needed.     Al Marie DO  Palliative Medicine Fellow  Staffed with Dr. Scott    Attending attestation:   Patient seen and evaluated with Dr. Marie and I agree with findings and recs in this note.   Thank you for involving us in the patient's care.   Precious Scott MD / Palliative Medicine / Pager [phone] / After-Hours Answering Service [phone] / Main Palliative Clinic - [facility] [phone] / [facility] Inpatient Team Consult Pager [phone] (answered 8am-430pm M-F)

## 2021-03-12 NOTE — NURSING NOTE
"Chief Complaint   Patient presents with     Port Flush     Port accessed with 20g 3/4\" gripper needle by RN in lab.  Port flushed with saline and heparin.  Port de-accessed.    Joann Andrews RN      "

## 2021-03-15 ENCOUNTER — IMMUNIZATION (OUTPATIENT)
Dept: FAMILY MEDICINE | Facility: CLINIC | Age: 56
End: 2021-03-15
Payer: MEDICARE

## 2021-03-15 PROCEDURE — 0011A PR COVID VAC MODERNA 100 MCG/0.5 ML IM: CPT

## 2021-03-15 PROCEDURE — 91301 PR COVID VAC MODERNA 100 MCG/0.5 ML IM: CPT

## 2021-03-22 ENCOUNTER — VIRTUAL VISIT (OUTPATIENT)
Dept: ONCOLOGY | Facility: CLINIC | Age: 56
End: 2021-03-22
Attending: PHYSICIAN ASSISTANT
Payer: MEDICARE

## 2021-03-22 VITALS
SYSTOLIC BLOOD PRESSURE: 130 MMHG | TEMPERATURE: 98.9 F | HEART RATE: 114 BPM | RESPIRATION RATE: 16 BRPM | DIASTOLIC BLOOD PRESSURE: 65 MMHG | OXYGEN SATURATION: 96 % | BODY MASS INDEX: 23.45 KG/M2 | WEIGHT: 156.5 LBS

## 2021-03-22 DIAGNOSIS — C16.8 MALIGNANT NEOPLASM OF OVERLAPPING SITES OF STOMACH (H): ICD-10-CM

## 2021-03-22 DIAGNOSIS — D50.9 IRON DEFICIENCY ANEMIA, UNSPECIFIED IRON DEFICIENCY ANEMIA TYPE: ICD-10-CM

## 2021-03-22 LAB
ALBUMIN SERPL-MCNC: 3.1 G/DL (ref 3.4–5)
ALP SERPL-CCNC: 256 U/L (ref 40–150)
ALT SERPL W P-5'-P-CCNC: 61 U/L (ref 0–50)
ANION GAP SERPL CALCULATED.3IONS-SCNC: 6 MMOL/L (ref 3–14)
AST SERPL W P-5'-P-CCNC: 36 U/L (ref 0–45)
BASOPHILS # BLD AUTO: 0 10E9/L (ref 0–0.2)
BASOPHILS NFR BLD AUTO: 0.4 %
BILIRUB SERPL-MCNC: 0.4 MG/DL (ref 0.2–1.3)
BUN SERPL-MCNC: 13 MG/DL (ref 7–30)
CALCIUM SERPL-MCNC: 8.2 MG/DL (ref 8.5–10.1)
CHLORIDE SERPL-SCNC: 105 MMOL/L (ref 94–109)
CO2 SERPL-SCNC: 26 MMOL/L (ref 20–32)
CREAT SERPL-MCNC: 0.77 MG/DL (ref 0.52–1.04)
DIFFERENTIAL METHOD BLD: ABNORMAL
EOSINOPHIL # BLD AUTO: 0.1 10E9/L (ref 0–0.7)
EOSINOPHIL NFR BLD AUTO: 1 %
ERYTHROCYTE [DISTWIDTH] IN BLOOD BY AUTOMATED COUNT: 14 % (ref 10–15)
FERRITIN SERPL-MCNC: 110 NG/ML (ref 8–252)
GFR SERPL CREATININE-BSD FRML MDRD: 87 ML/MIN/{1.73_M2}
GLUCOSE SERPL-MCNC: 109 MG/DL (ref 70–99)
HCT VFR BLD AUTO: 35.2 % (ref 35–47)
HGB BLD-MCNC: 11.1 G/DL (ref 11.7–15.7)
IMM GRANULOCYTES # BLD: 0 10E9/L (ref 0–0.4)
IMM GRANULOCYTES NFR BLD: 0.3 %
IRON SATN MFR SERPL: 10 % (ref 15–46)
IRON SERPL-MCNC: 32 UG/DL (ref 35–180)
LYMPHOCYTES # BLD AUTO: 1.7 10E9/L (ref 0.8–5.3)
LYMPHOCYTES NFR BLD AUTO: 15.3 %
MCH RBC QN AUTO: 30.9 PG (ref 26.5–33)
MCHC RBC AUTO-ENTMCNC: 31.5 G/DL (ref 31.5–36.5)
MCV RBC AUTO: 98 FL (ref 78–100)
MONOCYTES # BLD AUTO: 0.7 10E9/L (ref 0–1.3)
MONOCYTES NFR BLD AUTO: 6.5 %
NEUTROPHILS # BLD AUTO: 8.3 10E9/L (ref 1.6–8.3)
NEUTROPHILS NFR BLD AUTO: 76.5 %
NRBC # BLD AUTO: 0 10*3/UL
NRBC BLD AUTO-RTO: 0 /100
PLATELET # BLD AUTO: 187 10E9/L (ref 150–450)
POTASSIUM SERPL-SCNC: 3.3 MMOL/L (ref 3.4–5.3)
PROT SERPL-MCNC: 6.6 G/DL (ref 6.8–8.8)
RBC # BLD AUTO: 3.59 10E12/L (ref 3.8–5.2)
SODIUM SERPL-SCNC: 137 MMOL/L (ref 133–144)
TIBC SERPL-MCNC: 322 UG/DL (ref 240–430)
WBC # BLD AUTO: 10.8 10E9/L (ref 4–11)

## 2021-03-22 PROCEDURE — 999N001193 HC VIDEO/TELEPHONE VISIT; NO CHARGE

## 2021-03-22 PROCEDURE — 36415 COLL VENOUS BLD VENIPUNCTURE: CPT

## 2021-03-22 PROCEDURE — 85025 COMPLETE CBC W/AUTO DIFF WBC: CPT | Performed by: INTERNAL MEDICINE

## 2021-03-22 PROCEDURE — 82728 ASSAY OF FERRITIN: CPT | Performed by: INTERNAL MEDICINE

## 2021-03-22 PROCEDURE — 83540 ASSAY OF IRON: CPT | Performed by: INTERNAL MEDICINE

## 2021-03-22 PROCEDURE — 80053 COMPREHEN METABOLIC PANEL: CPT | Performed by: INTERNAL MEDICINE

## 2021-03-22 PROCEDURE — 83550 IRON BINDING TEST: CPT | Performed by: INTERNAL MEDICINE

## 2021-03-22 PROCEDURE — 99215 OFFICE O/P EST HI 40 MIN: CPT | Mod: 95 | Performed by: PHYSICIAN ASSISTANT

## 2021-03-22 ASSESSMENT — PAIN SCALES - GENERAL: PAINLEVEL: MILD PAIN (3)

## 2021-03-22 NOTE — PATIENT INSTRUCTIONS
Cancer Survivorship Visit  Low potassium. Take potassium chloride 20 mEq daily x 3 days and increase potassium in diet.   Guidelines for a high-potassium diet     Eat fruits and vegetables in their fresh or raw form most often.    Check labels for ingredients that have potassium. This includes potassium chloride. Add these items to your diet.    Try salt substitutes. Many of these have potassium.    Don't eat large amounts of licorice. This includes licorice root and teas that have licorice. These can reduce potassium levels in your body.  Eat plenty of the following high-potassium foods:     Fruits. Good choices are apricots (canned and fresh), bananas, cantaloupe, honeydew melon, kiwi, nectarines, oranges, orange juice, and pears. Dried fruits include apricots, dates, figs, and prunes. Prune juice also has potassium.    Vegetables. Good choices are asparagus, avocado, artichoke, broccoli, bamboo shoots, beets, [place] sprouts, cabbage, celery, chard, okra, potatoes (white and sweet), pumpkin, rutabaga, spinach (cooked), squash, and tomatoes. Tomato sauce, tomato juice, and vegetable juice cocktail are also good choices.    Chicken, fish, clams, and crab    Milk, chocolate milk, buttermilk, and soy milk    Legumes. These include black-eyed peas, chickpeas, lentils, lima beans, navy beans, red kidney beans, soybeans, and split peas.    Nuts and seeds. Try almonds, Brazil nuts, cashews, peanuts, peanut butter, pecans, pumpkin seeds, sunflower seeds, and walnuts.    Breads and cereals. These include bran and whole-grain products.    Others foods include chocolate, cocoa, coconut milk, and molasses    Iron deficiency. Start on ferrous sulfate 325 mg every other day.     Peripheral neuropathy. Try vitamin B complex or vitamin B6 supplement.      Cancer screenings: We know that people who have a history of cancer can be at an increased risk for other cancers during their lifetime. It is important that you see your  primary care provider at least annually to help coordinate any necessary cancer screenings.    Promoting healthy lifestyle: Healthy lifestyle habits have been associated with improved overall health and quality of life. For some cancers, a healthy lifestyle has been associated with a reduced risk of recurrence and death. We recommend the following lifestyle habits:  maintaining a healthy BMI (body mass index) throughout life  - Engaging in at least 150 minutes of moderate activity weekly (moderate is described as any activity in which you could talk, but not sing, including dancing, biking on level ground, - general gardening, baseball, tennis, brisk walking, water aerobics, yoga, pilates, etc), or 75 minutes of vigorous activity weekly (vigorous is described as being able to say a few words without stopping to catch a breath) biking faster than 10 mph, hiking uphill, jogging, swimming, stair climbing, high-intensity yoga, etc)  maintaining a healthy diet high in vegetables, fruits, and whole grains; and low in refined sugars, red meat (less than 18 oz per week), and processed meats  - Limiting alcohol to no more than one drink per day for women, and two drinks per day for men  practicing sun safety with use of sunscreen of at least 30, that protects against UVA and UVB rays, and is water resistant.   - You should receive the influenza vaccine annually. If you are over the age of 50, you should talk with your primary care provider about getting the recombinant zoster (shingles) vaccination.  - Dental cleanings every 6-12 months  - Eye examinations every 1-2 years or as indicated by your eye doctor    Potential long term side effects: Given treatment with chemotherapy, the patient is at risk for potential long term side effects, as follows:    Sexuality concerns:  Treatment can cause menopause for some women, which can cause increased vaginal dryness, hot flashes, mood swings, fatigue, and irritability. Women of naa  age may notice decreased libido and difficulty achieving orgasm.    Symptoms of menopause:  - Speak with your primary care provider regarding any possible treatment options for hot flashes  - Vaginal dryness: Recommend vaginal moisturizers, vaginal gels, oils, topical vitamin D or E; lubricants for sexual activity    Mood: Anxiety, depression, and post-traumatic stress disorder are common following cancer treatment. If you are struggling with any of these issues, it is important that you discuss with your care team, as we can often help manage these disorders. Exercise can help with all of these disorders. Consider looking into some of the resources listed, below.    Cognitive function: Some people find that they have persistent  chemo brain  following treatment.  Consider organizational strategies (ie notebooks, planners, reminders, smart phone technology), routine physical activity, limiting use of alcohol, considering yoga, meditation, mindfulness-based stress reduction, and cognitive training (Ie brain games)      Resources:  National Coalition for Cancer Survivorship (NCCS)  http://www.canceradvocacy.org/    American Association for Cancer Research (AACR)    A six-part podcast series about survivorship in partnership with [employer] [place] and [employer]  [employer]ness Community:  http://www.aacr.org/ http://www.crmagazine.org/archive/Crpodcasts/Pages/SurvivingThriving.aspx      American Cancer Society (ACS)    Survivorship information    Cancer Survivors Network    National Cancer Survivorship Resource Center    Physical side effects information, including sexual function  http://www.cancer.org/index http://www.cancer.org/treatment/survivorshipduringandaftertreatment/index http://csn.cancer.org/  http://www.cancer.org/SurvivorshipCenter http://www.cancer.org/treatment/treatmentsandsideeffects/physicalsideeffects/index        American [place] for Cancer Research (AICR): Survivorship information, Nutrition, physical activity,  weight management  http://www.aicr.org/patients-survivors/     Cancer Care: Free, professional support services for anyone affected by cancer  www.cancercare.org    [employer]  http://www.livestrong.org/    National Cancer [place]: Cancer Survivorship Research    Springboard Beyond Cancer, Facing Forward series, designed to educate cancer  survivors, family members, and health care providers about the challenges associated with life after cancer treatment  http://survivorship.cancer.gov https://survivorship.cancer.gov/springboard http://cancercontrol.cancer.gov/ocs/resources/ffseries.html       National Comprehensive Cancer Network (NCCN)    Life After Cancer: Patient and Caregiver Resources and Information  http://www.nccn.org/index.asp http://www.nccn.org/patients/resources/life_after_cancer/     Help lines  American Cancer Society: 1.512.778.9453 http://www.cancer.org  American Psychosocical Oncology Society: 2.350.097.7016 http://apos-society.org/  Cancer Support Community 0.030.244.0766 http://www.cancersupportcommunity.org/  [employer] SurvivorCare 8.830.113.9568  National Suicide Prevention Lifeline 4-763-163-TALK http://suicidepreventionlifeline.org

## 2021-03-22 NOTE — PROGRESS NOTES
Nathalie is a 55 year old who is being evaluated via a billable video visit.      How would you like to obtain your AVS? MyChart     If the video visit is dropped, the invitation should be resent by: Text to cell phone: [phone]     Will anyone else be joining your video visit? No     /65 (BP Location: Right arm, Patient Position: Sitting, Cuff Size: Adult Regular)   Pulse 114   Temp 98.9  F (37.2  C) (Oral)   Resp 16   Wt 71 kg (156 lb 8 oz)   LMP 09/23/2014   SpO2 96%   BMI 23.45 kg/m      Christian Harper LPN      Video-Visit Details    Type of service:  Video Visit    Video Start Time: 2:13 PM    Video End Time:2:34 PM    Originating Location (pt. Location): Home    Distant Location (provider location):  [facility]     Platform used for Video Visit: [email or web address]    60 minutes spent on the date of the encounter doing chart review, review of test results, interpretation of tests, patient visit and documentation     CANCER SURVIVORSHIP VISIT  Mar 22, 2021    REASON FOR VISIT: survivorship visit for history of gastric cancer    CANCER HISTORY AND TREATMENT:  Ms. Bashir is a 55 year old female with a history of gastric adenocarcinoma.  She was being followed for a pancreatic duct dilatation and pancreatic cyst which was noted in November 2018.  In May 2019 she started noticing some nausea and vomiting and occasional abdominal discomfort.  She had an ultrasound in August 2019 which was essentially unremarkable.  She had a repeat endoscopic ultrasound on 10/29/2019 which showed increase in size of the cyst as well as dilation of the main pancreatic duct.  FNA and fluid analysis showed mucinous epithelium.  She was referred to Dr. 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  Surprisingly there was no pancreatic ductal neoplasm seen but on the resected specimen stomach cancer was noted.  It was poorly differentiated adenocarcinoma with poorly  cohesive/signet ring cell type with proximal gastric mucosal and serosal margins being positive.  There was lymphovascular and perineural invasion seen.  22 lymph nodes were sampled and all were benign.  It was a pT4aN0 lesion.  HER-2/christine FISH was not amplified.         EUS on 3/3/2020 without clear evidence of neoplasm endoscopically. Left gastric lymph node fine-needle aspiration was negative for carcinoma. Biopsy from the gastric jejunal anastomosis as well as lesser curvature of the stomach also did not show any malignancy.     She had a PET/CT on 3/13/2020 which showed soft tissue streaky density with increased FDG uptake adjacent to the pancreaticojejunostomy which could be neoplastic in origin.  There is mild increased FDG uptake in the gastric remnant.  Focal area of soft tissue thickening with fatty streakiness along medial laparotomy with increased FDG uptake which likely is inflammatory. Increased FDG uptake in thoracic esophagus which could be esophagitis.      She started on chemotherapy with 5FU, oxaliplatin, and Taxotere on 3/27/20. She was seen on 4/7/20 for evaluation of fevers. She was treated for possible pneumonia with Levaquin.      She was seen in clinic 4/13/20 for RUQ pain,CT and labs reassuring. She received cycles 2 and 3 of chemotherapy on 4/20/20 and 5/4/20. She was admitted from 5/21-5/23/20 with neutropenic aspiration pneumonia. She received cycle 4 on 5/27/20 with Neulasta support. PET/CT on 6/3/20 showed mild residual uptake at the site of gastrojejunal anastomosis which likely is physiologic.  There is almost resolution of soft tissue nodule next to the pancreaticoduodenal anastomosis without FDG uptake.  There is focal increased uptake in the left posterior acetabulum which could be artifact.     Decreased dose of oxaliplatin due to neuropathy/cold sensitivity on 6/11. Decreased 5FU to 85% with cycle #6 due to mucositis/diarrhea/taste changes. Treatment was held 7/9/20 due to  diarrhea, dehydration, tachycardia, rash, and weight loss and she got cycle 7 on 7/15. On 7/20 she presented to the ED with RUQ abdominal pain, and was admitted 7/20-7/29 for acute cholangitis      She received C#8 FLOT on 8/14/2020.     PET/CT on 9/2/2020 did not show evidence of disease.  Small peritoneal nodule was a stable.  This was most consistent with fat necrosis.     CT chest abdomen and pelvis on 9/23/2020 also does not show evidence of malignancy recurrence and the peritoneal nodule in the right upper quadrant is stable.  Before definite surgery she had diagnostic laparoscopy which was negative for evidence of malignancy so on 10/21/2020 she had exploratory laparotomy with extensive lysis of additions, partial omentectomy, resection of Gastrojejunal Anastomosis w/ En Bloc Subtotal Gastrectomy and modified D2 Lymphadenectomy with David-en-Y Gastrojejunostomy Reconstruction.  Final pathology showed Focal residual poorly differentiated adenocarcinoma (0.4cm) with signet-ring morphology status    post-neoadjuvant chemotherapy and all 9 lymph nodes were benign.  All margins were negative.  It was a pT1bN0 lesion.     Before definite surgery she had diagnostic laparoscopy which was negative for evidence of malignancy so on 10/21/2020 she had exploratory laparotomy with extensive lysis of additions, partial omentectomy, resection of Gastrojejunal Anastomosis w/ En Bloc Subtotal Gastrectomy and modified D2 Lymphadenectomy with David-en-Y Gastrojejunostomy Reconstruction.  Final pathology showed Focal residual poorly differentiated adenocarcinoma (0.4cm) with signet-ring morphology status post-neoadjuvant chemotherapy and all 9 lymph nodes were benign.  All margins were negative.  It was a [id number] lesion.     She was admitted from 11/4/2020-11/6/2020 for nausea vomiting and left upper quadrant pain and CT scan showed fluid collection in the left upper quadrant, likely hematoma.  Upper GI was negative for leak.  No  drainage was recommended by IR.  She was discharged on 7 days of levofloxacin and Flagyl.     11/20/2020.  CT abdomen and pelvis showed postsurgical changes of Whipple and subtotal gastrectomy with David-en-Y reconstruction.  There is resolution of previously demonstrated fluid collection between the stomach and the spleen and decrease in sigmoid thickening.  Small left pleural effusion with atelectasis adjacent to it.     12/7/2020.  EGD showed healthy-appearing and generous pouch (remaining the stomach) with widely patent end to side gastrojejunostomy, though with the David/enteral limb mildly stenostic and tortuous at its origin. Therefore a covered metal stent was placed from stomach to jejunum across the stenotic region, resulting in straightening of the course.    INTERVAL HISTORY:   -Has been doing okay overall.   -Has been having more difficulty with acid reflux, managed with antacids and avoiding ibuprofen.  -Eating some meats. Some foods are higher to digest.   -Has ongoing neuropathy in hands and feet. Acupuncture did not help.   -Has been going for walks on treadmill every other day or go outside.   -No change to abdominal pain. Working with palliative care.     Current Outpatient Medications   Medication Sig Dispense Refill     amitriptyline (ELAVIL) 100 MG tablet Take 1 tablet (100 mg) by mouth At Bedtime 30 tablet 3     amylase-lipase-protease (CREON 24) 57830-47592 units CPEP per EC capsule 2 capsules with meals and 1 capsule with snacks 240 capsule 4     calcium carbonate 500 mg, elemental, (OSCAL 500) 1250 (500 Ca) MG TABS tablet Take by mouth 3 times daily       citalopram (CELEXA) 20 MG tablet Take 20 mg by mouth every morning        Cyanocobalamin 1000 MCG CAPS Take by mouth daily       cyclobenzaprine (FLEXERIL) 10 MG tablet Take 10 mg by mouth 3 times daily as needed for muscle spasms       famotidine (PEPCID) 20 MG tablet Take 20 mg by mouth 2 times daily as needed        fluticasone (FLONASE)  50 MCG/ACT nasal spray Spray 1 spray into both nostrils daily       hydrOXYzine (VISTARIL) 25 MG capsule Take 25-50 mg by mouth 4 times daily as needed for itching       ibuprofen (ADVIL/MOTRIN) 600 MG tablet Take 1 tablet (600 mg) by mouth every 6 hours as needed for moderate pain 50 tablet 0     lidocaine (XYLOCAINE) 2 % solution Swish and swallow 15 mLs in mouth every 3 hours as needed for moderate pain ; Max 8 doses/24 hour period. 100 mL 0     loperamide (IMODIUM) 2 MG capsule Take 4 mg by mouth 4 times daily as needed for diarrhea        loratadine-pseudoePHEDrine (CLARITIN-D 24-HOUR)  MG 24 hr tablet Take 1 tablet by mouth daily as needed        magnesium 250 MG tablet Take 1 tablet by mouth daily       metoclopramide (REGLAN) 5 MG tablet Take 5 mg by mouth 2 times daily as needed       multivitamin w/minerals (MULTI-VITAMIN) tablet Take 1 tablet by mouth daily       ondansetron (ZOFRAN) 8 MG tablet Take 1 tablet (8 mg) by mouth every 8 hours as needed (Nausea/Vomiting) 90 tablet 2     oxyCODONE (ROXICODONE) 5 MG tablet Take 1 tablet (5 mg) by mouth 3 times daily as needed for moderate to severe pain Maximum 3 pills/day. 90 tablet 0     pantoprazole (PROTONIX) 40 MG EC tablet Take 1 tablet (40 mg) by mouth 2 times daily 60 tablet 11     potassium chloride ER (KLOR-CON M) 20 MEQ CR tablet TAKE ONE TABLET BY MOUTH ONCE DAILY (Patient taking differently: daily as needed ) 30 tablet 1     prochlorperazine (COMPAZINE) 10 MG tablet Take 1 tablet (10 mg) by mouth every 6 hours as needed for nausea or vomiting 60 tablet 3     simethicone 80 MG TABS Take 1 tablet by mouth every 6 hours as needed (bloating, gas, belching) 90 tablet 3     sucralfate (CARAFATE) 1 GM tablet Take 1 tablet (1 g) by mouth 4 times daily as needed (reflux.) 360 tablet 6     SUMAtriptan (IMITREX) 100 MG tablet Take 100 mg by mouth at onset of headache for migraine       PHYSICAL EXAMINATION  General: patient appears well in no acute  distress, alert and oriented, speech clear and fluid  Skin: no visualized rash or lesions on visualized skin  Resp: Appears to be breathing comfortably without accessory muscle usage, speaking in full sentences, no audible wheezes or cough.  Psych: Coherent speech, normal rate and volume, able to articulate logical thoughts, able to abstract reason, no tangential thoughts, no hallucinations or delusions  Patient's affect is appropriate.    LABS:   3/22/2021 12:02   Sodium 137   Potassium 3.3 (L)   Chloride 105   Carbon Dioxide 26   Urea Nitrogen 13   Creatinine 0.77   GFR Estimate 87   GFR Estimate If Black >90   Calcium 8.2 (L)   Anion Gap 6   Albumin 3.1 (L)   Protein Total 6.6 (L)   Bilirubin Total 0.4   Alkaline Phosphatase 256 (H)   ALT 61 (H)   AST 36   Ferritin 110   Iron 32 (L)   Iron Binding Cap 322   Iron Saturation Index 10 (L)   Glucose 109 (H)   WBC 10.8   Hemoglobin 11.1 (L)   Hematocrit 35.2   Platelet Count 187   RBC Count 3.59 (L)   MCV 98   MCH 30.9   MCHC 31.5   RDW 14.0   Diff Method Automated Method   % Neutrophils 76.5   % Lymphocytes 15.3   % Monocytes 6.5   % Eosinophils 1.0   % Basophils 0.4   % Immature Granulocytes 0.3   Nucleated RBCs 0   Absolute Neutrophil 8.3   Absolute Lymphocytes 1.7   Absolute Monocytes 0.7   Absolute Eosinophils 0.1   Absolute Basophils 0.0   Abs Immature Granulocytes 0.0   Absolute Nucleated RBC 0.0     IMPRESSION/PLAN:  Nathalie Bashir is a 55 year old female with a history of gastric cancer, here for cancer survivorship visit following completion of cancer treatment.    Cancer history. Patient has a history of gastric cancer, treated with surgical resection and chemotherapy with 8 cycles of 5FU, oxaliplatin, and Taxotere (FLOT), as detailed above. She will continue to follow-up with the oncology team as scheduled, with plan for follow-up every 3 months for the first 2-3 years and every 6 months for years 4-5.    Genetic testing. She had genetic testing that did  "not show any mutations.    Peripheral neuropathy. She did develop peripheral neuropathy that is stable. I would expect this to likely be chronic for her, as her last chemotherapy was in August 2020 and she has not noticed any recent improvement. She did not find a benefit from acupuncture. We discussed trying a vitamin B complex or vitamin B 6 supplement.     Coping. Feels she has coped well with treatment. Remains on citalopram for mood and anxiety.     Cognitive changes. She did develop cognitive changes on treatment and reports \"chemo brain\" about twice/week. She declined a referral for cancer rehab.   - Recommend regular exercise and cognitive rehabilitation exercises, including BrainHQ, Luminosity, puzzles, etc    Cancer screening. She should undergo routine screening for women in her age group.   Patient should discuss plans for PAP smears and mammograms with her primary care provider. Will get these scheduled. Her last colonoscopy was in July 2018. Recommend next one in July 2028.  Smoking risk: Quit smoking about a year ago. Smoked for 20 years, 1 ppd. We will watch for lung nodules on her routine cancer surveillance imaging.     Healthy lifestyle.   She should maintain a healthy weight with a BMI between 20-25. BMI is appropriate.  She should exercise at least 150 minutes weekly at moderate intensity. Recommend working towards this goal.  She should see the eye doctor every 1-2 years, and dentist every 6 months for cleanings. Will schedule to see eye doctor and dentist.   She should not use any tobacco. Denies any recent use. History, as above.  She should minimize alcohol intake. Denies use.     Hypokalemia. She has potassium left at home. She will take potassium 20 meQ/day for 3 days and increase potassium in diet. She was given a high potassium foods handout.     Iron deficiency history. Iron studies are starting to trend down again. She received Infed in October 2020. She will start on ferrous sulfate " 325 mg every other day for now. I will review this with Dr. Pollard, as she may need another dose of IV iron given her subtotal gastrectomy.    Roxana Sweet PA-C  [facility] [address] [address] [phone]    Addendum: Will plan for another dose of Infed, per discussion with Dr. Pollard.

## 2021-03-22 NOTE — NURSING NOTE
Chief Complaint   Patient presents with     Blood Draw     labs drawn with vpt by rn.  vs taken     Labs drawn with vpt by rn.  Pt tolerated well.  VS taken.  Pt checked in for next appt.    Jo Disla RN

## 2021-03-22 NOTE — LETTER
3/22/2021         RE: Nathalie Bashir  [address] [facility] [address]        Dear Colleague,    Thank you for referring your patient, Nathalie Bashir, to the [facility]. Please see a copy of my visit note below.    Nathalie is a 55 year old who is being evaluated via a billable video visit.      How would you like to obtain your AVS? MyChart     If the video visit is dropped, the invitation should be resent by: Text to cell phone: [phone]     Will anyone else be joining your video visit? No     /65 (BP Location: Right arm, Patient Position: Sitting, Cuff Size: Adult Regular)   Pulse 114   Temp 98.9  F (37.2  C) (Oral)   Resp 16   Wt 71 kg (156 lb 8 oz)   LMP 09/23/2014   SpO2 96%   BMI 23.45 kg/m      Christian Harper LPN      Video-Visit Details    Type of service:  Video Visit    Video Start Time: 2:13 PM    Video End Time:2:34 PM    Originating Location (pt. Location): Home    Distant Location (provider location):  [facility]     Platform used for Video Visit: Reduxio    60 minutes spent on the date of the encounter doing chart review, review of test results, interpretation of tests, patient visit and documentation     CANCER SURVIVORSHIP VISIT  Mar 22, 2021    REASON FOR VISIT: survivorship visit for history of gastric cancer    CANCER HISTORY AND TREATMENT:  Ms. Bashir is a 55 year old female with a history of gastric adenocarcinoma.  She was being followed for a pancreatic duct dilatation and pancreatic cyst which was noted in November 2018.  In May 2019 she started noticing some nausea and vomiting and occasional abdominal discomfort.  She had an ultrasound in August 2019 which was essentially unremarkable.  She had a repeat endoscopic ultrasound on 10/29/2019 which showed increase in size of the cyst as well as dilation of the main pancreatic duct.  FNA and fluid analysis showed mucinous epithelium.  She was referred to  Dr. 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  Surprisingly there was no pancreatic ductal neoplasm seen but on the resected specimen stomach cancer was noted.  It was poorly differentiated adenocarcinoma with poorly cohesive/signet ring cell type with proximal gastric mucosal and serosal margins being positive.  There was lymphovascular and perineural invasion seen.  22 lymph nodes were sampled and all were benign.  It was a pT4aN0 lesion.  HER-2/christine FISH was not amplified.         EUS on 3/3/2020 without clear evidence of neoplasm endoscopically. Left gastric lymph node fine-needle aspiration was negative for carcinoma. Biopsy from the gastric jejunal anastomosis as well as lesser curvature of the stomach also did not show any malignancy.     She had a PET/CT on 3/13/2020 which showed soft tissue streaky density with increased FDG uptake adjacent to the pancreaticojejunostomy which could be neoplastic in origin.  There is mild increased FDG uptake in the gastric remnant.  Focal area of soft tissue thickening with fatty streakiness along medial laparotomy with increased FDG uptake which likely is inflammatory. Increased FDG uptake in thoracic esophagus which could be esophagitis.      She started on chemotherapy with 5FU, oxaliplatin, and Taxotere on 3/27/20. She was seen on 4/7/20 for evaluation of fevers. She was treated for possible pneumonia with Levaquin.      She was seen in clinic 4/13/20 for RUQ pain,CT and labs reassuring. She received cycles 2 and 3 of chemotherapy on 4/20/20 and 5/4/20. She was admitted from 5/21-5/23/20 with neutropenic aspiration pneumonia. She received cycle 4 on 5/27/20 with Neulasta support. PET/CT on 6/3/20 showed mild residual uptake at the site of gastrojejunal anastomosis which likely is physiologic.  There is almost resolution of soft tissue nodule next to the pancreaticoduodenal anastomosis without FDG uptake.  There is focal increased  uptake in the left posterior acetabulum which could be artifact.     Decreased dose of oxaliplatin due to neuropathy/cold sensitivity on 6/11. Decreased 5FU to 85% with cycle #6 due to mucositis/diarrhea/taste changes. Treatment was held 7/9/20 due to diarrhea, dehydration, tachycardia, rash, and weight loss and she got cycle 7 on 7/15. On 7/20 she presented to the ED with RUQ abdominal pain, and was admitted 7/20-7/29 for acute cholangitis      She received C#8 FLOT on 8/14/2020.     PET/CT on 9/2/2020 did not show evidence of disease.  Small peritoneal nodule was a stable.  This was most consistent with fat necrosis.     CT chest abdomen and pelvis on 9/23/2020 also does not show evidence of malignancy recurrence and the peritoneal nodule in the right upper quadrant is stable.  Before definite surgery she had diagnostic laparoscopy which was negative for evidence of malignancy so on 10/21/2020 she had exploratory laparotomy with extensive lysis of additions, partial omentectomy, resection of Gastrojejunal Anastomosis w/ En Bloc Subtotal Gastrectomy and modified D2 Lymphadenectomy with David-en-Y Gastrojejunostomy Reconstruction.  Final pathology showed Focal residual poorly differentiated adenocarcinoma (0.4cm) with signet-ring morphology status    post-neoadjuvant chemotherapy and all 9 lymph nodes were benign.  All margins were negative.  It was a pT1bN0 lesion.     Before definite surgery she had diagnostic laparoscopy which was negative for evidence of malignancy so on 10/21/2020 she had exploratory laparotomy with extensive lysis of additions, partial omentectomy, resection of Gastrojejunal Anastomosis w/ En Bloc Subtotal Gastrectomy and modified D2 Lymphadenectomy with David-en-Y Gastrojejunostomy Reconstruction.  Final pathology showed Focal residual poorly differentiated adenocarcinoma (0.4cm) with signet-ring morphology status post-neoadjuvant chemotherapy and all 9 lymph nodes were benign.  All margins  were negative.  It was a [id number] lesion.     She was admitted from 11/4/2020-11/6/2020 for nausea vomiting and left upper quadrant pain and CT scan showed fluid collection in the left upper quadrant, likely hematoma.  Upper GI was negative for leak.  No drainage was recommended by IR.  She was discharged on 7 days of levofloxacin and Flagyl.     11/20/2020.  CT abdomen and pelvis showed postsurgical changes of Whipple and subtotal gastrectomy with David-en-Y reconstruction.  There is resolution of previously demonstrated fluid collection between the stomach and the spleen and decrease in sigmoid thickening.  Small left pleural effusion with atelectasis adjacent to it.     12/7/2020.  EGD showed healthy-appearing and generous pouch (remaining the stomach) with widely patent end to side gastrojejunostomy, though with the David/enteral limb mildly stenostic and tortuous at its origin. Therefore a covered metal stent was placed from stomach to jejunum across the stenotic region, resulting in straightening of the course.    INTERVAL HISTORY:   -Has been doing okay overall.   -Has been having more difficulty with acid reflux, managed with antacids and avoiding ibuprofen.  -Eating some meats. Some foods are higher to digest.   -Has ongoing neuropathy in hands and feet. Acupuncture did not help.   -Has been going for walks on treadmill every other day or go outside.   -No change to abdominal pain. Working with palliative care.     Current Outpatient Medications   Medication Sig Dispense Refill     amitriptyline (ELAVIL) 100 MG tablet Take 1 tablet (100 mg) by mouth At Bedtime 30 tablet 3     amylase-lipase-protease (CREON 24) 74596-84062 units CPEP per EC capsule 2 capsules with meals and 1 capsule with snacks 240 capsule 4     calcium carbonate 500 mg, elemental, (OSCAL 500) 1250 (500 Ca) MG TABS tablet Take by mouth 3 times daily       citalopram (CELEXA) 20 MG tablet Take 20 mg by mouth every morning        Cyanocobalamin  1000 MCG CAPS Take by mouth daily       cyclobenzaprine (FLEXERIL) 10 MG tablet Take 10 mg by mouth 3 times daily as needed for muscle spasms       famotidine (PEPCID) 20 MG tablet Take 20 mg by mouth 2 times daily as needed        fluticasone (FLONASE) 50 MCG/ACT nasal spray Spray 1 spray into both nostrils daily       hydrOXYzine (VISTARIL) 25 MG capsule Take 25-50 mg by mouth 4 times daily as needed for itching       ibuprofen (ADVIL/MOTRIN) 600 MG tablet Take 1 tablet (600 mg) by mouth every 6 hours as needed for moderate pain 50 tablet 0     lidocaine (XYLOCAINE) 2 % solution Swish and swallow 15 mLs in mouth every 3 hours as needed for moderate pain ; Max 8 doses/24 hour period. 100 mL 0     loperamide (IMODIUM) 2 MG capsule Take 4 mg by mouth 4 times daily as needed for diarrhea        loratadine-pseudoePHEDrine (CLARITIN-D 24-HOUR)  MG 24 hr tablet Take 1 tablet by mouth daily as needed        magnesium 250 MG tablet Take 1 tablet by mouth daily       metoclopramide (REGLAN) 5 MG tablet Take 5 mg by mouth 2 times daily as needed       multivitamin w/minerals (MULTI-VITAMIN) tablet Take 1 tablet by mouth daily       ondansetron (ZOFRAN) 8 MG tablet Take 1 tablet (8 mg) by mouth every 8 hours as needed (Nausea/Vomiting) 90 tablet 2     oxyCODONE (ROXICODONE) 5 MG tablet Take 1 tablet (5 mg) by mouth 3 times daily as needed for moderate to severe pain Maximum 3 pills/day. 90 tablet 0     pantoprazole (PROTONIX) 40 MG EC tablet Take 1 tablet (40 mg) by mouth 2 times daily 60 tablet 11     potassium chloride ER (KLOR-CON M) 20 MEQ CR tablet TAKE ONE TABLET BY MOUTH ONCE DAILY (Patient taking differently: daily as needed ) 30 tablet 1     prochlorperazine (COMPAZINE) 10 MG tablet Take 1 tablet (10 mg) by mouth every 6 hours as needed for nausea or vomiting 60 tablet 3     simethicone 80 MG TABS Take 1 tablet by mouth every 6 hours as needed (bloating, gas, belching) 90 tablet 3     sucralfate (CARAFATE) 1 GM  tablet Take 1 tablet (1 g) by mouth 4 times daily as needed (reflux.) 360 tablet 6     SUMAtriptan (IMITREX) 100 MG tablet Take 100 mg by mouth at onset of headache for migraine       PHYSICAL EXAMINATION  General: patient appears well in no acute distress, alert and oriented, speech clear and fluid  Skin: no visualized rash or lesions on visualized skin  Resp: Appears to be breathing comfortably without accessory muscle usage, speaking in full sentences, no audible wheezes or cough.  Psych: Coherent speech, normal rate and volume, able to articulate logical thoughts, able to abstract reason, no tangential thoughts, no hallucinations or delusions  Patient's affect is appropriate.    LABS:   3/22/2021 12:02   Sodium 137   Potassium 3.3 (L)   Chloride 105   Carbon Dioxide 26   Urea Nitrogen 13   Creatinine 0.77   GFR Estimate 87   GFR Estimate If Black >90   Calcium 8.2 (L)   Anion Gap 6   Albumin 3.1 (L)   Protein Total 6.6 (L)   Bilirubin Total 0.4   Alkaline Phosphatase 256 (H)   ALT 61 (H)   AST 36   Ferritin 110   Iron 32 (L)   Iron Binding Cap 322   Iron Saturation Index 10 (L)   Glucose 109 (H)   WBC 10.8   Hemoglobin 11.1 (L)   Hematocrit 35.2   Platelet Count 187   RBC Count 3.59 (L)   MCV 98   MCH 30.9   MCHC 31.5   RDW 14.0   Diff Method Automated Method   % Neutrophils 76.5   % Lymphocytes 15.3   % Monocytes 6.5   % Eosinophils 1.0   % Basophils 0.4   % Immature Granulocytes 0.3   Nucleated RBCs 0   Absolute Neutrophil 8.3   Absolute Lymphocytes 1.7   Absolute Monocytes 0.7   Absolute Eosinophils 0.1   Absolute Basophils 0.0   Abs Immature Granulocytes 0.0   Absolute Nucleated RBC 0.0     IMPRESSION/PLAN:  Nathalie Bashir is a 55 year old female with a history of gastric cancer, here for cancer survivorship visit following completion of cancer treatment.    Cancer history. Patient has a history of gastric cancer, treated with surgical resection and chemotherapy with 8 cycles of 5FU, oxaliplatin, and  "Taxotere (FLOT), as detailed above. She will continue to follow-up with the oncology team as scheduled, with plan for follow-up every 3 months for the first 2-3 years and every 6 months for years 4-5.    Genetic testing. She had genetic testing that did not show any mutations.    Peripheral neuropathy. She did develop peripheral neuropathy that is stable. I would expect this to likely be chronic for her, as her last chemotherapy was in August 2020 and she has not noticed any recent improvement. She did not find a benefit from acupuncture. We discussed trying a vitamin B complex or vitamin B 6 supplement.     Coping. Feels she has coped well with treatment. Remains on citalopram for mood and anxiety.     Cognitive changes. She did develop cognitive changes on treatment and reports \"chemo brain\" about twice/week. She declined a referral for cancer rehab.   - Recommend regular exercise and cognitive rehabilitation exercises, including BrainHQ, Luminosity, puzzles, etc    Cancer screening. She should undergo routine screening for women in her age group.   Patient should discuss plans for PAP smears and mammograms with her primary care provider. Will get these scheduled. Her last colonoscopy was in July 2018. Recommend next one in July 2028.  Smoking risk: Quit smoking about a year ago. Smoked for 20 years, 1 ppd. We will watch for lung nodules on her routine cancer surveillance imaging.     Healthy lifestyle.   She should maintain a healthy weight with a BMI between 20-25. BMI is appropriate.  She should exercise at least 150 minutes weekly at moderate intensity. Recommend working towards this goal.  She should see the eye doctor every 1-2 years, and dentist every 6 months for cleanings. Will schedule to see eye doctor and dentist.   She should not use any tobacco. Denies any recent use. History, as above.  She should minimize alcohol intake. Denies use.     Hypokalemia. She has potassium left at home. She will take " potassium 20 meQ/day for 3 days and increase potassium in diet. She was given a high potassium foods handout.     Iron deficiency history. Iron studies are starting to trend down again. She received Infed in October 2020. She will start on ferrous sulfate 325 mg every other day for now. I will review this with Dr. Pollard, as she may need another dose of IV iron given her subtotal gastrectomy.    Roxana Sweet PA-C  [facility] [address] [address]  [phone]    Addendum: Will plan for another dose of Infed, per discussion with Dr. Pollard.        Again, thank you for allowing me to participate in the care of your patient.        Sincerely,        Roxana Sweet PA-C

## 2021-03-23 RX ORDER — HEPARIN SODIUM (PORCINE) LOCK FLUSH IV SOLN 100 UNIT/ML 100 UNIT/ML
5 SOLUTION INTRAVENOUS
Status: CANCELLED | OUTPATIENT
Start: 2021-03-23

## 2021-03-23 RX ORDER — HEPARIN SODIUM,PORCINE 10 UNIT/ML
5 VIAL (ML) INTRAVENOUS
Status: CANCELLED | OUTPATIENT
Start: 2021-03-23

## 2021-03-31 ENCOUNTER — INFUSION THERAPY VISIT (OUTPATIENT)
Dept: ONCOLOGY | Facility: CLINIC | Age: 56
End: 2021-03-31
Attending: INTERNAL MEDICINE
Payer: MEDICARE

## 2021-03-31 VITALS
DIASTOLIC BLOOD PRESSURE: 67 MMHG | OXYGEN SATURATION: 97 % | SYSTOLIC BLOOD PRESSURE: 95 MMHG | TEMPERATURE: 98.3 F | RESPIRATION RATE: 16 BRPM | HEART RATE: 95 BPM

## 2021-03-31 DIAGNOSIS — C16.9 GASTRIC ADENOCARCINOMA (H): ICD-10-CM

## 2021-03-31 DIAGNOSIS — D50.9 IRON DEFICIENCY ANEMIA, UNSPECIFIED IRON DEFICIENCY ANEMIA TYPE: Primary | ICD-10-CM

## 2021-03-31 PROCEDURE — 250N000011 HC RX IP 250 OP 636: Performed by: INTERNAL MEDICINE

## 2021-03-31 PROCEDURE — 96365 THER/PROPH/DIAG IV INF INIT: CPT

## 2021-03-31 PROCEDURE — 258N000003 HC RX IP 258 OP 636: Performed by: PHYSICIAN ASSISTANT

## 2021-03-31 PROCEDURE — 250N000011 HC RX IP 250 OP 636: Performed by: PHYSICIAN ASSISTANT

## 2021-03-31 RX ORDER — HEPARIN SODIUM (PORCINE) LOCK FLUSH IV SOLN 100 UNIT/ML 100 UNIT/ML
5 SOLUTION INTRAVENOUS
Status: CANCELLED | OUTPATIENT
Start: 2021-03-31

## 2021-03-31 RX ORDER — HEPARIN SODIUM (PORCINE) LOCK FLUSH IV SOLN 100 UNIT/ML 100 UNIT/ML
500 SOLUTION INTRAVENOUS ONCE
Status: COMPLETED | OUTPATIENT
Start: 2021-03-31 | End: 2021-03-31

## 2021-03-31 RX ORDER — HEPARIN SODIUM,PORCINE 10 UNIT/ML
5 VIAL (ML) INTRAVENOUS
Status: CANCELLED | OUTPATIENT
Start: 2021-03-31

## 2021-03-31 RX ADMIN — Medication 500 UNITS: at 10:47

## 2021-03-31 RX ADMIN — SODIUM CHLORIDE 500 MG: 9 INJECTION, SOLUTION INTRAVENOUS at 09:29

## 2021-03-31 RX ADMIN — SODIUM CHLORIDE 250 ML: 9 INJECTION, SOLUTION INTRAVENOUS at 09:12

## 2021-03-31 ASSESSMENT — PAIN SCALES - GENERAL: PAINLEVEL: MILD PAIN (3)

## 2021-03-31 NOTE — PROGRESS NOTES
Infusion Nursing Note:  Nathalie Bashir presents today for Infed.    Patient seen by provider today: No    Note: Pt reports feeling well with no concerns. Received Infed last fall with no issues. Confirmed with pharmacy OK to infuse over 60 minutes today.    Intravenous Access:  Implanted Port.    Post Infusion Assessment:  Patient tolerated infusion without incident.  Blood return noted pre and post infusion.  Site patent and intact, free from redness, edema or discomfort.  No evidence of extravasations. Access discontinued per protocol.     Discharge Plan:   Patient declined prescription refills.  AVS to patient via [employer].  Patient will return 5/27 for next provider appointment.   Patient discharged in stable condition accompanied by: self.  Departure Mode: Ambulatory.    Jada Parrish RN

## 2021-04-04 ENCOUNTER — HEALTH MAINTENANCE LETTER (OUTPATIENT)
Age: 56
End: 2021-04-04

## 2021-04-12 ENCOUNTER — IMMUNIZATION (OUTPATIENT)
Dept: FAMILY MEDICINE | Facility: CLINIC | Age: 56
End: 2021-04-12
Attending: FAMILY MEDICINE
Payer: MEDICARE

## 2021-04-12 PROCEDURE — 0012A PR COVID VAC MODERNA 100 MCG/0.5 ML IM: CPT

## 2021-04-12 PROCEDURE — 91301 PR COVID VAC MODERNA 100 MCG/0.5 ML IM: CPT

## 2021-04-14 ENCOUNTER — MYC REFILL (OUTPATIENT)
Dept: PALLIATIVE CARE | Facility: CLINIC | Age: 56
End: 2021-04-14

## 2021-04-14 DIAGNOSIS — G89.18 POSTOPERATIVE PAIN: ICD-10-CM

## 2021-04-14 RX ORDER — OXYCODONE HYDROCHLORIDE 5 MG/1
5 TABLET ORAL 3 TIMES DAILY PRN
Qty: 90 TABLET | Refills: 0 | Status: SHIPPED | OUTPATIENT
Start: 2021-04-14 | End: 2021-05-11

## 2021-04-14 NOTE — TELEPHONE ENCOUNTER
Received mychart request from patient requesting refill of oxycodone.     Last refill: 3/17/2021  Last office visit: 3/4/2021  Scheduled for follow up 6/3/2021    Will route request to MD for review.     Reviewed [place]  Report.

## 2021-04-26 ENCOUNTER — PATIENT OUTREACH (OUTPATIENT)
Dept: PALLIATIVE CARE | Facility: CLINIC | Age: 56
End: 2021-04-26

## 2021-04-26 NOTE — PROGRESS NOTES
Received VM from patient asking for call back. Spoke with her - she tells me that she would have had foot surgery last year if wasn't for cancer. Now she is scheduled for foot surgery May 25th through [facility]. She states palliative to manage post op pain and that she discussed this with palliative MDs and ortho MD already.     She will mychart us surgeon's info/contact info when she gets home. Advised I would pass this update to the MD and follow up with any questions.

## 2021-04-26 NOTE — PROGRESS NOTES
Called patient - advised it is MD's preference surgeon handles post op pain until she returns back to her baseline pain doses, at which time we will take back over. Explained she is not on high enough doses for her to have developed significant tolerance or for her to need more then average pain control following her procedure.     She verbalized understanding and was okay with that. She will communicate that with her surgeon and let us know if there are any other questions/concerns. She will plan to ask for just enough of a refill to get to her surgery when she is due to next to keep her pain control simple.

## 2021-05-10 ENCOUNTER — PATIENT OUTREACH (OUTPATIENT)
Dept: PALLIATIVE CARE | Facility: CLINIC | Age: 56
End: 2021-05-10

## 2021-05-10 NOTE — PROGRESS NOTES
Received VM from patient - she wanted to let us know she cancelled surgery that was scheduled for end of the month. Surgery rescheduled for July 8th.     Will update MD.     She notes she may not be reachable at her home phone #, try cell if needed.

## 2021-05-11 ENCOUNTER — MYC REFILL (OUTPATIENT)
Dept: PALLIATIVE CARE | Facility: CLINIC | Age: 56
End: 2021-05-11

## 2021-05-11 DIAGNOSIS — G89.18 POSTOPERATIVE PAIN: ICD-10-CM

## 2021-05-12 RX ORDER — OXYCODONE HYDROCHLORIDE 5 MG/1
5 TABLET ORAL 3 TIMES DAILY PRN
Qty: 90 TABLET | Refills: 0 | Status: SHIPPED | OUTPATIENT
Start: 2021-05-12 | End: 2021-06-09

## 2021-05-12 NOTE — TELEPHONE ENCOUNTER
Received mychart request from patient for refill of oxycodone.     Last refill: 4/14/2021  Last office visit: 3/4/2021  Scheduled for follow up 6/24/2021     Will route request to MD for review.     Reviewed [place]  Report.

## 2021-05-21 ENCOUNTER — ANCILLARY PROCEDURE (OUTPATIENT)
Dept: CT IMAGING | Facility: CLINIC | Age: 56
End: 2021-05-21
Attending: INTERNAL MEDICINE
Payer: MEDICARE

## 2021-05-21 DIAGNOSIS — Z95.828 PORT-A-CATH IN PLACE: Primary | ICD-10-CM

## 2021-05-21 DIAGNOSIS — D50.9 IRON DEFICIENCY ANEMIA, UNSPECIFIED IRON DEFICIENCY ANEMIA TYPE: ICD-10-CM

## 2021-05-21 DIAGNOSIS — C16.8 MALIGNANT NEOPLASM OF OVERLAPPING SITES OF STOMACH (H): ICD-10-CM

## 2021-05-21 LAB
ALBUMIN SERPL-MCNC: 3.4 G/DL (ref 3.4–5)
ALP SERPL-CCNC: 176 U/L (ref 40–150)
ALT SERPL W P-5'-P-CCNC: 42 U/L (ref 0–50)
ANION GAP SERPL CALCULATED.3IONS-SCNC: 3 MMOL/L (ref 3–14)
AST SERPL W P-5'-P-CCNC: 31 U/L (ref 0–45)
BASOPHILS # BLD AUTO: 0 10E9/L (ref 0–0.2)
BASOPHILS NFR BLD AUTO: 0.4 %
BILIRUB SERPL-MCNC: 0.4 MG/DL (ref 0.2–1.3)
BUN SERPL-MCNC: 12 MG/DL (ref 7–30)
CALCIUM SERPL-MCNC: 8.5 MG/DL (ref 8.5–10.1)
CHLORIDE SERPL-SCNC: 107 MMOL/L (ref 94–109)
CO2 SERPL-SCNC: 29 MMOL/L (ref 20–32)
CREAT SERPL-MCNC: 0.93 MG/DL (ref 0.52–1.04)
DIFFERENTIAL METHOD BLD: NORMAL
EOSINOPHIL # BLD AUTO: 0.1 10E9/L (ref 0–0.7)
EOSINOPHIL NFR BLD AUTO: 1.9 %
ERYTHROCYTE [DISTWIDTH] IN BLOOD BY AUTOMATED COUNT: 13 % (ref 10–15)
GFR SERPL CREATININE-BSD FRML MDRD: 70 ML/MIN/{1.73_M2}
GLUCOSE SERPL-MCNC: 102 MG/DL (ref 70–99)
HCT VFR BLD AUTO: 38.2 % (ref 35–47)
HGB BLD-MCNC: 12.2 G/DL (ref 11.7–15.7)
IMM GRANULOCYTES # BLD: 0 10E9/L (ref 0–0.4)
IMM GRANULOCYTES NFR BLD: 0.2 %
LYMPHOCYTES # BLD AUTO: 1.5 10E9/L (ref 0.8–5.3)
LYMPHOCYTES NFR BLD AUTO: 32 %
MCH RBC QN AUTO: 31.3 PG (ref 26.5–33)
MCHC RBC AUTO-ENTMCNC: 31.9 G/DL (ref 31.5–36.5)
MCV RBC AUTO: 98 FL (ref 78–100)
MONOCYTES # BLD AUTO: 0.3 10E9/L (ref 0–1.3)
MONOCYTES NFR BLD AUTO: 7.2 %
NEUTROPHILS # BLD AUTO: 2.8 10E9/L (ref 1.6–8.3)
NEUTROPHILS NFR BLD AUTO: 58.3 %
NRBC # BLD AUTO: 0 10*3/UL
NRBC BLD AUTO-RTO: 0 /100
PLATELET # BLD AUTO: 197 10E9/L (ref 150–450)
POTASSIUM SERPL-SCNC: 4.2 MMOL/L (ref 3.4–5.3)
PROT SERPL-MCNC: 7 G/DL (ref 6.8–8.8)
RBC # BLD AUTO: 3.9 10E12/L (ref 3.8–5.2)
SODIUM SERPL-SCNC: 139 MMOL/L (ref 133–144)
WBC # BLD AUTO: 4.8 10E9/L (ref 4–11)

## 2021-05-21 PROCEDURE — 71260 CT THORAX DX C+: CPT | Mod: MG | Performed by: RADIOLOGY

## 2021-05-21 PROCEDURE — 36591 DRAW BLOOD OFF VENOUS DEVICE: CPT

## 2021-05-21 PROCEDURE — G1004 CDSM NDSC: HCPCS | Performed by: RADIOLOGY

## 2021-05-21 PROCEDURE — 74177 CT ABD & PELVIS W/CONTRAST: CPT | Mod: MG | Performed by: RADIOLOGY

## 2021-05-21 PROCEDURE — 80053 COMPREHEN METABOLIC PANEL: CPT | Performed by: INTERNAL MEDICINE

## 2021-05-21 PROCEDURE — 85025 COMPLETE CBC W/AUTO DIFF WBC: CPT | Performed by: INTERNAL MEDICINE

## 2021-05-21 PROCEDURE — 250N000011 HC RX IP 250 OP 636: Performed by: INTERNAL MEDICINE

## 2021-05-21 RX ORDER — HEPARIN SODIUM (PORCINE) LOCK FLUSH IV SOLN 100 UNIT/ML 100 UNIT/ML
5 SOLUTION INTRAVENOUS ONCE
Status: COMPLETED | OUTPATIENT
Start: 2021-05-21 | End: 2021-05-21

## 2021-05-21 RX ORDER — HEPARIN SODIUM (PORCINE) LOCK FLUSH IV SOLN 100 UNIT/ML 100 UNIT/ML
500 SOLUTION INTRAVENOUS ONCE
Status: COMPLETED | OUTPATIENT
Start: 2021-05-21 | End: 2021-11-15

## 2021-05-21 RX ORDER — IOPAMIDOL 755 MG/ML
96 INJECTION, SOLUTION INTRAVASCULAR ONCE
Status: COMPLETED | OUTPATIENT
Start: 2021-05-21 | End: 2021-05-21

## 2021-05-21 RX ADMIN — Medication 5 ML: at 10:36

## 2021-05-21 RX ADMIN — IOPAMIDOL 96 ML: 755 INJECTION, SOLUTION INTRAVASCULAR at 10:57

## 2021-05-21 NOTE — NURSING NOTE
Chief Complaint   Patient presents with     Port Draw     Labs drawn via port by RN in lab.      Port accessed with 20g flat needle by RN, labs collected, line flushed with saline and heparin.      Bel BALDERAS RN PHN BSN  BMT/Oncology Lab

## 2021-05-27 ENCOUNTER — VIRTUAL VISIT (OUTPATIENT)
Dept: ONCOLOGY | Facility: CLINIC | Age: 56
End: 2021-05-27
Attending: INTERNAL MEDICINE
Payer: MEDICARE

## 2021-05-27 DIAGNOSIS — C16.9 GASTRIC ADENOCARCINOMA (H): ICD-10-CM

## 2021-05-27 DIAGNOSIS — D50.9 IRON DEFICIENCY ANEMIA, UNSPECIFIED IRON DEFICIENCY ANEMIA TYPE: Primary | ICD-10-CM

## 2021-05-27 PROCEDURE — 99213 OFFICE O/P EST LOW 20 MIN: CPT | Mod: 95 | Performed by: INTERNAL MEDICINE

## 2021-05-27 PROCEDURE — 999N001193 HC VIDEO/TELEPHONE VISIT; NO CHARGE

## 2021-05-27 NOTE — PROGRESS NOTES
Oncology/Hematology Visit Note  May 27, 2021    Reason for Visit: Follow up of gastric adenocarcinoma.    History of Present Illness:     Please see previous notes for detail. I have copied and updated from prior notes.    Ms. Bashir is a 55 year old female with a history of gastric adenocarcinoma.  She was being followed for a pancreatic duct dilatation and pancreatic cyst which was noted in November 2018.  Since May 2019 she started noticing some nausea and vomiting and occasional abdominal discomfort.  She had an ultrasound in August 2019 which was essentially unremarkable.  She had a repeat endoscopic ultrasound on 10/29/2019 which showed increase in size of the cyst as well as dilation of the main pancreatic duct.  FNA and fluid analysis showed mucinous epithelium.  She was referred to Dr. 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  Surprisingly there was no pancreatic ductal neoplasm seen but on the resected specimen stomach cancer was noted.  It was poorly differentiated adenocarcinoma with poorly cohesive/signet ring cell type with proximal gastric mucosal and serosal margins being positive.  There was lymphovascular and perineural invasion seen.  22 lymph nodes were sampled and all were benign.  It was a pT4aN0 lesion.  HER-2/christine FISH was not amplified.         EUS on 3/3/2020 without clear evidence of neoplasm endoscopically. Left gastric lymph node fine-needle aspiration was negative for carcinoma. Biopsy from the gastric jejunal anastomosis as well as lesser curvature of the stomach also did not show any malignancy.     She had a PET/CT on 3/13/2020 which showed soft tissue streaky density with increased FDG uptake adjacent to the pancreaticojejunostomy which could be neoplastic in origin.  There is mild increased FDG uptake in the gastric remnant.  Focal area of soft tissue thickening with fatty streakiness along medial laparotomy with increased FDG uptake which  likely is inflammatory. Increased FDG uptake in thoracic esophagus which could be esophagitis.      She started on chemotherapy with 5FU, oxaliplatin, and Taxotere on 3/27/20. She was seen on 4/7/20 for evaluation of fevers. She was treated for possible pneumonia with Levaquin.      She was seen in clinic 4/13/20 for RUQ pain,CT and labs reassuring. She received cycles 2 and 3 of chemotherapy on 4/20/20 and 5/4/20. She was admitted from 5/21-5/23/20 with neutropenic aspiration pneumonia. She received cycle 4 on 5/27/20 with Neulasta support. PET/CT on 6/3/20 showed mild residual uptake at the site of gastrojejunal anastomosis which likely is physiologic.  There is almost resolution of soft tissue nodule next to the pancreaticoduodenal anastomosis without FDG uptake.  There is focal increased uptake in the left posterior acetabulum which could be artifact.     Decreased dose of oxaliplatin due to neuropathy/cold sensitivity on 6/11. Decreased 5FU to 85% with cycle #6 due to mucositis/diarrhea/taste changes. Treatment was held 7/9/20 due to diarrhea, dehydration, tachycardia, rash, and weight loss and she got cycle 7 on 7/15. On 7/20 she presented to the ED with RUQ abdominal pain, and was admitted 7/20-7/29 for acute cholangitis     She received C#8 FLOT on 8/14/2020.    PET/CT on 9/2/2020 did not show evidence of disease.  Small peritoneal nodule was a stable.  This was most consistent with fat necrosis.    CT chest abdomen and pelvis on 9/23/2020 also does not show evidence of malignancy recurrence and the peritoneal nodule in the right upper quadrant is stable.  Before definite surgery she had diagnostic laparoscopy which was negative for evidence of malignancy so on 10/21/2020 she had exploratory laparotomy with extensive lysis of additions, partial omentectomy, resection of Gastrojejunal Anastomosis w/ En Bloc Subtotal Gastrectomy and modified D2 Lymphadenectomy with David-en-Y Gastrojejunostomy Reconstruction.   Final pathology showed Focal residual poorly differentiated adenocarcinoma (0.4cm) with signet-ring morphology status    post-neoadjuvant chemotherapy and all 9 lymph nodes were benign.  All margins were negative.  It was a pT1bN0 lesion.    Before definite surgery she had diagnostic laparoscopy which was negative for evidence of malignancy so on 10/21/2020 she had exploratory laparotomy with extensive lysis of additions, partial omentectomy, resection of Gastrojejunal Anastomosis w/ En Bloc Subtotal Gastrectomy and modified D2 Lymphadenectomy with David-en-Y Gastrojejunostomy Reconstruction.  Final pathology showed Focal residual poorly differentiated adenocarcinoma (0.4cm) with signet-ring morphology status    post-neoadjuvant chemotherapy and all 9 lymph nodes were benign.  All margins were negative.  It was a [id number] lesion.    She was admitted from 11/4/2020-11/6/2020 for nausea vomiting and left upper quadrant pain and CT scan showed fluid collection in the left upper quadrant, likely hematoma.  Upper GI was negative for leak.  No drainage was recommended by IR.  He was discharged on 7 days of levofloxacin and Flagyl.      11/20/2020.  CT abdomen and pelvis showed postsurgical changes of Whipple and subtotal gastrectomy with David-en-Y reconstruction.  There is resolution of previously demonstrated fluid collection between the stomach and the spleen and decrease in sigmoid thickening.  Small left pleural effusion with atelectasis adjacent to it.    12/7/2020.  EGD showed healthy-appearing and generous pouch (remaining the stomach) with widely patent end to side gastrojejunostomy, though with the David/enteral limb mildly stenostic and tortuous at its origin. Therefore a covered metal stent was placed from stomach to jejunum across the stenotic region, resulting in straightening of the course.    2/8/2021.  EGD was done and stent was removed.    5/21/2021.  CT chest abdomen and pelvis does not show evidence of  recurrence of malignancy.    Interval History:  -this is a video visit through [facility].  She is doing very well.  She denies any abdominal pain but sometimes gets abdominal bloating.  Denies nausea vomiting.  Occasionally gets mildly constipated.  She is eating well and she thinks she has gained some weight.  Energy is very good now.  She has noticed further improvement in neuropathy in her hands.  Feet are is still the same.  She takes amitriptyline.  She has not tried laser therapy but she is looking into it.  No new swellings.  No infections.  No shortness of breath.      ECOG 1    ROS:  Otherwise a comprehensive review of the system was unremarkable.    I reviewed other history in epic as below.      Current Outpatient Medications   Medication Sig Dispense Refill     amitriptyline (ELAVIL) 100 MG tablet Take 1 tablet (100 mg) by mouth At Bedtime 30 tablet 3     amylase-lipase-protease (CREON 24) 33908-07225 units CPEP per EC capsule 2 capsules with meals and 1 capsule with snacks 240 capsule 4     calcium carbonate 500 mg, elemental, (OSCAL 500) 1250 (500 Ca) MG TABS tablet Take by mouth 3 times daily       citalopram (CELEXA) 20 MG tablet Take 20 mg by mouth every morning        Cyanocobalamin 1000 MCG CAPS Take by mouth daily       cyclobenzaprine (FLEXERIL) 10 MG tablet Take 10 mg by mouth 3 times daily as needed for muscle spasms       famotidine (PEPCID) 20 MG tablet Take 20 mg by mouth 2 times daily as needed        fluticasone (FLONASE) 50 MCG/ACT nasal spray Spray 1 spray into both nostrils daily       hydrOXYzine (VISTARIL) 25 MG capsule Take 25-50 mg by mouth 4 times daily as needed for itching       ibuprofen (ADVIL/MOTRIN) 600 MG tablet Take 1 tablet (600 mg) by mouth every 6 hours as needed for moderate pain 50 tablet 0     lidocaine (XYLOCAINE) 2 % solution Swish and swallow 15 mLs in mouth every 3 hours as needed for moderate pain ; Max 8 doses/24 hour period. 100 mL 0     loperamide (IMODIUM) 2  MG capsule Take 4 mg by mouth 4 times daily as needed for diarrhea        loratadine-pseudoePHEDrine (CLARITIN-D 24-HOUR)  MG 24 hr tablet Take 1 tablet by mouth daily as needed        magnesium 250 MG tablet Take 1 tablet by mouth daily       metoclopramide (REGLAN) 5 MG tablet Take 5 mg by mouth 2 times daily as needed       multivitamin w/minerals (MULTI-VITAMIN) tablet Take 1 tablet by mouth daily       ondansetron (ZOFRAN) 8 MG tablet Take 1 tablet (8 mg) by mouth every 8 hours as needed (Nausea/Vomiting) 90 tablet 2     oxyCODONE (ROXICODONE) 5 MG tablet Take 1 tablet (5 mg) by mouth 3 times daily as needed for moderate to severe pain Maximum 3 pills/day. 90 tablet 0     pantoprazole (PROTONIX) 40 MG EC tablet Take 1 tablet (40 mg) by mouth 2 times daily 60 tablet 11     potassium chloride ER (KLOR-CON M) 20 MEQ CR tablet TAKE ONE TABLET BY MOUTH ONCE DAILY (Patient taking differently: daily as needed ) 30 tablet 1     prochlorperazine (COMPAZINE) 10 MG tablet Take 1 tablet (10 mg) by mouth every 6 hours as needed for nausea or vomiting 60 tablet 3     simethicone 80 MG TABS Take 1 tablet by mouth every 6 hours as needed (bloating, gas, belching) 90 tablet 3     sucralfate (CARAFATE) 1 GM tablet Take 1 tablet (1 g) by mouth 4 times daily as needed (reflux.) 360 tablet 6     SUMAtriptan (IMITREX) 100 MG tablet Take 100 mg by mouth at onset of headache for migraine         Physical Examination:    LMP 09/23/2014   Wt Readings from Last 10 Encounters:   03/22/21 71 kg (156 lb 8 oz)   02/08/21 64.7 kg (142 lb 9.6 oz)   02/04/21 66.2 kg (146 lb)   12/07/20 64.6 kg (142 lb 8 oz)   11/20/20 70.8 kg (156 lb 1.6 oz)   11/04/20 72.6 kg (160 lb)   10/25/20 74 kg (163 lb 3.2 oz)   10/16/20 75.6 kg (166 lb 9.6 oz)   10/13/20 74.6 kg (164 lb 7.4 oz)   09/25/20 78 kg (172 lb)       Constitutional.  Looks well and in no apparent distress.   Eyes.  Without eye redness or apparent jaundice.   Respiratory.  Non labored  breathing. Speaking in full sentences.    Skin.  No concerning skin rashes on the skin visualized.   Neurological.  Is alert and oriented.  Psychiatric.  Mood and affect seem appropriate.      The rest of a comprehensive physical examination is deferred due to Public Health Emergency video visit restrictions.      Laboratory Data:     Reviewed  5/21/2021  CBC unremarkable  CMP unremarkable except Alk Phos 176    3/22/2021- ferritin 110 iron 32 TIBC 322 and iron sat 10.      2/8/2021.  EGD showed David en Y anatomy with generous pouch and stent bridging the GJ and proximal David   The stent was removed and a relook endoscopies showed widely patent prioximal David and associated superficial ulceration    5/21/2021.  CT chest abdomen and pelvis does not show evidence of recurrence of malignancy.      11/20/2020.  CT abdomen and pelvis showed postsurgical changes of Whipple and subtotal gastrectomy with David-en-Y reconstruction.  There is resolution of previously demonstrated fluid collection between the stomach and the spleen and decrease in sigmoid thickening.  Small left pleural effusion with atelectasis adjacent to it.    12/7/2020.  EGD showed healthy-appearing and generous pouch (remaining the stomach) with widely patent end to side gastrojejunostomy, though with the David/enteral limb mildly stenostic and tortuous at its origin. Therefore a covered metal stent was placed from stomach to jejunum across the stenotic region, resulting in straightening of the course.    Assessment and Plan:    Gastric adenocarcinona, HER-2 negative.   - Started on neoadjuvant FLOT on 3/7/2020. She has required a dose reduction of her oxaliplatin by 30% due to cold sensitivity/neuropathy with cycle #5. 5FU was decreased to 85% with cycle #6 due to mucositis/diarrhea/taste changes.   Received C#8 on 8/14/2020.  PET/CT on 9/2/2020 did not show evidence of disease.  Small peritoneal nodule was stable.  This was most consistent with fat  necrosis.    CT chest abdomen and pelvis on 9/23/2020 also does not show evidence of malignancy recurrence and the peritoneal nodule in the right upper quadrant is stable.    Before definite surgery she had diagnostic laparoscopy which was negative for evidence of malignancy so on 10/21/2020 she had exploratory laparotomy with extensive lysis of additions, partial omentectomy, resection of Gastrojejunal Anastomosis w/ En Bloc Subtotal Gastrectomy and modified D2 Lymphadenectomy with David-en-Y Gastrojejunostomy Reconstruction.  Final pathology showed Focal residual poorly differentiated adenocarcinoma (0.4cm) with signet-ring morphology status    post-neoadjuvant chemotherapy and all 9 lymph nodes were benign.  All margins were negative.  It was a [id number] lesion.    We decided to keep her on observation as she had received 6 months of neoadjuvant chemotherapy and then the definite surgery.  Plan to repeat his scans every 6 months and do EGD as needed.      She is doing very well and currently there is no evidence of recurrence.    I will plan to repeat CT chest abdomen and pelvis in 6 months.      She was followed in cancer survivorship program.    Abdominal pain.  This improved after EGD in December 2020 with a covered metal stent was placed from his stomach to jejunum across the stenotic region of David/enteral limb.  The stent was removed in February 2021.  Currently she denies any significant abdominal pain.  Continue to observe.      Anemia.  She had iron deficiency anemia and she received INFeD on 10/16/2020.  Now hemoglobin is normal.  She had still evidence of mild iron deficiency in March 2021.  My plan is to repeat labs including iron studies in 6 months.       Neuropathy.  She thinks that the neuropathy has improved in the fingers but she still has tingling and numbness of her feet.  She tried acupuncture once but it upset her nerves so she stopped that.  We again discussed considering laser therapy and she  is looking into it.  She is on amitriptyline at night.  She also takes oxycodone for the nerve pain.  She is following with palliative care.       Port-A-Cath.  We discussed pros and cons of removing the Port-A-Cath and at this time we decided to keep it in at least for a total of 2 years from the completion of treatment and in the meantime she will get it flushed every 8 weeks.     Return to clinic in 6 months with labs/CT scan prior.    All questions answered and she is agreeable with the plan.    Magaly Pollard MD

## 2021-05-27 NOTE — LETTER
"    5/27/2021         RE: Nathalie Bashir  [address] [facility] [address]        Dear Colleague,    Thank you for referring your patient, Nathalie Bashir, to the [facility]. Please see a copy of my visit note below.    Nathalie is a 55 year old who is being evaluated via a billable video visit.      How would you like to obtain your AVS? MyChart  If the video visit is dropped, the invitation should be resent by: Text to cell phone: [phone]  Will anyone else be joining your video visit? No      I have reviewed and updated the patient's allergies and medication list.    Concerns: No new concerns.   Refills: None needed.        Vitals - Patient Reported  Height (Patient Reported): 172.7 cm (5' 7.99\")  Pain Score: Mild Pain (3)  Pain Loc: (NEUROPATHY)    Astrid Couch CMA      Video Start Time: 10:28AM  Video-Visit Details    Type of service:  Video Visit    Video End Time:10:35 AM    Originating Location (pt. Location): Home    Distant Location (provider location):  [facility]     Platform used for Video Visit: Doximity    Oncology/Hematology Visit Note  May 27, 2021    Reason for Visit: Follow up of gastric adenocarcinoma.    History of Present Illness:     Please see previous notes for detail. I have copied and updated from prior notes.    Ms. Bashir is a 55 year old female with a history of gastric adenocarcinoma.  She was being followed for a pancreatic duct dilatation and pancreatic cyst which was noted in November 2018.  Since May 2019 she started noticing some nausea and vomiting and occasional abdominal discomfort.  She had an ultrasound in August 2019 which was essentially unremarkable.  She had a repeat endoscopic ultrasound on 10/29/2019 which showed increase in size of the cyst as well as dilation of the main pancreatic duct.  FNA and fluid analysis showed mucinous epithelium.  She was referred to Dr. 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  Surprisingly there was no pancreatic ductal neoplasm seen but on the resected specimen stomach cancer was noted.  It was poorly differentiated adenocarcinoma with poorly cohesive/signet ring cell type with proximal gastric mucosal and serosal margins being positive.  There was lymphovascular and perineural invasion seen.  22 lymph nodes were sampled and all were benign.  It was a pT4aN0 lesion.  HER-2/christine FISH was not amplified.         EUS on 3/3/2020 without clear evidence of neoplasm endoscopically. Left gastric lymph node fine-needle aspiration was negative for carcinoma. Biopsy from the gastric jejunal anastomosis as well as lesser curvature of the stomach also did not show any malignancy.     She had a PET/CT on 3/13/2020 which showed soft tissue streaky density with increased FDG uptake adjacent to the pancreaticojejunostomy which could be neoplastic in origin.  There is mild increased FDG uptake in the gastric remnant.  Focal area of soft tissue thickening with fatty streakiness along medial laparotomy with increased FDG uptake which likely is inflammatory. Increased FDG uptake in thoracic esophagus which could be esophagitis.      She started on chemotherapy with 5FU, oxaliplatin, and Taxotere on 3/27/20. She was seen on 4/7/20 for evaluation of fevers. She was treated for possible pneumonia with Levaquin.      She was seen in clinic 4/13/20 for RUQ pain,CT and labs reassuring. She received cycles 2 and 3 of chemotherapy on 4/20/20 and 5/4/20. She was admitted from 5/21-5/23/20 with neutropenic aspiration pneumonia. She received cycle 4 on 5/27/20 with Neulasta support. PET/CT on 6/3/20 showed mild residual uptake at the site of gastrojejunal anastomosis which likely is physiologic.  There is almost resolution of soft tissue nodule next to the pancreaticoduodenal anastomosis without FDG uptake.  There is focal increased uptake in the left posterior  acetabulum which could be artifact.     Decreased dose of oxaliplatin due to neuropathy/cold sensitivity on 6/11. Decreased 5FU to 85% with cycle #6 due to mucositis/diarrhea/taste changes. Treatment was held 7/9/20 due to diarrhea, dehydration, tachycardia, rash, and weight loss and she got cycle 7 on 7/15. On 7/20 she presented to the ED with RUQ abdominal pain, and was admitted 7/20-7/29 for acute cholangitis     She received C#8 FLOT on 8/14/2020.    PET/CT on 9/2/2020 did not show evidence of disease.  Small peritoneal nodule was a stable.  This was most consistent with fat necrosis.    CT chest abdomen and pelvis on 9/23/2020 also does not show evidence of malignancy recurrence and the peritoneal nodule in the right upper quadrant is stable.  Before definite surgery she had diagnostic laparoscopy which was negative for evidence of malignancy so on 10/21/2020 she had exploratory laparotomy with extensive lysis of additions, partial omentectomy, resection of Gastrojejunal Anastomosis w/ En Bloc Subtotal Gastrectomy and modified D2 Lymphadenectomy with David-en-Y Gastrojejunostomy Reconstruction.  Final pathology showed Focal residual poorly differentiated adenocarcinoma (0.4cm) with signet-ring morphology status    post-neoadjuvant chemotherapy and all 9 lymph nodes were benign.  All margins were negative.  It was a pT1bN0 lesion.    Before definite surgery she had diagnostic laparoscopy which was negative for evidence of malignancy so on 10/21/2020 she had exploratory laparotomy with extensive lysis of additions, partial omentectomy, resection of Gastrojejunal Anastomosis w/ En Bloc Subtotal Gastrectomy and modified D2 Lymphadenectomy with David-en-Y Gastrojejunostomy Reconstruction.  Final pathology showed Focal residual poorly differentiated adenocarcinoma (0.4cm) with signet-ring morphology status    post-neoadjuvant chemotherapy and all 9 lymph nodes were benign.  All margins were negative.  It was a [id number]  lesion.    She was admitted from 11/4/2020-11/6/2020 for nausea vomiting and left upper quadrant pain and CT scan showed fluid collection in the left upper quadrant, likely hematoma.  Upper GI was negative for leak.  No drainage was recommended by IR.  He was discharged on 7 days of levofloxacin and Flagyl.      11/20/2020.  CT abdomen and pelvis showed postsurgical changes of Whipple and subtotal gastrectomy with David-en-Y reconstruction.  There is resolution of previously demonstrated fluid collection between the stomach and the spleen and decrease in sigmoid thickening.  Small left pleural effusion with atelectasis adjacent to it.    12/7/2020.  EGD showed healthy-appearing and generous pouch (remaining the stomach) with widely patent end to side gastrojejunostomy, though with the David/enteral limb mildly stenostic and tortuous at its origin. Therefore a covered metal stent was placed from stomach to jejunum across the stenotic region, resulting in straightening of the course.    2/8/2021.  EGD was done and stent was removed.    5/21/2021.  CT chest abdomen and pelvis does not show evidence of recurrence of malignancy.    Interval History:  -this is a video visit through [employer].  She is doing very well.  She denies any abdominal pain but sometimes gets abdominal bloating.  Denies nausea vomiting.  Occasionally gets mildly constipated.  She is eating well and she thinks she has gained some weight.  Energy is very good now.  She has noticed further improvement in neuropathy in her hands.  Feet are is still the same.  She takes amitriptyline.  She has not tried laser therapy but she is looking into it.  No new swellings.  No infections.  No shortness of breath.      ECOG 1    ROS:  Otherwise a comprehensive review of the system was unremarkable.    I reviewed other history in epic as below.      Current Outpatient Medications   Medication Sig Dispense Refill     amitriptyline (ELAVIL) 100 MG tablet Take 1 tablet  (100 mg) by mouth At Bedtime 30 tablet 3     amylase-lipase-protease (CREON 24) 71783-85740 units CPEP per EC capsule 2 capsules with meals and 1 capsule with snacks 240 capsule 4     calcium carbonate 500 mg, elemental, (OSCAL 500) 1250 (500 Ca) MG TABS tablet Take by mouth 3 times daily       citalopram (CELEXA) 20 MG tablet Take 20 mg by mouth every morning        Cyanocobalamin 1000 MCG CAPS Take by mouth daily       cyclobenzaprine (FLEXERIL) 10 MG tablet Take 10 mg by mouth 3 times daily as needed for muscle spasms       famotidine (PEPCID) 20 MG tablet Take 20 mg by mouth 2 times daily as needed        fluticasone (FLONASE) 50 MCG/ACT nasal spray Spray 1 spray into both nostrils daily       hydrOXYzine (VISTARIL) 25 MG capsule Take 25-50 mg by mouth 4 times daily as needed for itching       ibuprofen (ADVIL/MOTRIN) 600 MG tablet Take 1 tablet (600 mg) by mouth every 6 hours as needed for moderate pain 50 tablet 0     lidocaine (XYLOCAINE) 2 % solution Swish and swallow 15 mLs in mouth every 3 hours as needed for moderate pain ; Max 8 doses/24 hour period. 100 mL 0     loperamide (IMODIUM) 2 MG capsule Take 4 mg by mouth 4 times daily as needed for diarrhea        loratadine-pseudoePHEDrine (CLARITIN-D 24-HOUR)  MG 24 hr tablet Take 1 tablet by mouth daily as needed        magnesium 250 MG tablet Take 1 tablet by mouth daily       metoclopramide (REGLAN) 5 MG tablet Take 5 mg by mouth 2 times daily as needed       multivitamin w/minerals (MULTI-VITAMIN) tablet Take 1 tablet by mouth daily       ondansetron (ZOFRAN) 8 MG tablet Take 1 tablet (8 mg) by mouth every 8 hours as needed (Nausea/Vomiting) 90 tablet 2     oxyCODONE (ROXICODONE) 5 MG tablet Take 1 tablet (5 mg) by mouth 3 times daily as needed for moderate to severe pain Maximum 3 pills/day. 90 tablet 0     pantoprazole (PROTONIX) 40 MG EC tablet Take 1 tablet (40 mg) by mouth 2 times daily 60 tablet 11     potassium chloride ER (KLOR-CON M) 20  MEQ CR tablet TAKE ONE TABLET BY MOUTH ONCE DAILY (Patient taking differently: daily as needed ) 30 tablet 1     prochlorperazine (COMPAZINE) 10 MG tablet Take 1 tablet (10 mg) by mouth every 6 hours as needed for nausea or vomiting 60 tablet 3     simethicone 80 MG TABS Take 1 tablet by mouth every 6 hours as needed (bloating, gas, belching) 90 tablet 3     sucralfate (CARAFATE) 1 GM tablet Take 1 tablet (1 g) by mouth 4 times daily as needed (reflux.) 360 tablet 6     SUMAtriptan (IMITREX) 100 MG tablet Take 100 mg by mouth at onset of headache for migraine         Physical Examination:    LMP 09/23/2014   Wt Readings from Last 10 Encounters:   03/22/21 71 kg (156 lb 8 oz)   02/08/21 64.7 kg (142 lb 9.6 oz)   02/04/21 66.2 kg (146 lb)   12/07/20 64.6 kg (142 lb 8 oz)   11/20/20 70.8 kg (156 lb 1.6 oz)   11/04/20 72.6 kg (160 lb)   10/25/20 74 kg (163 lb 3.2 oz)   10/16/20 75.6 kg (166 lb 9.6 oz)   10/13/20 74.6 kg (164 lb 7.4 oz)   09/25/20 78 kg (172 lb)       Constitutional.  Looks well and in no apparent distress.   Eyes.  Without eye redness or apparent jaundice.   Respiratory.  Non labored breathing. Speaking in full sentences.    Skin.  No concerning skin rashes on the skin visualized.   Neurological.  Is alert and oriented.  Psychiatric.  Mood and affect seem appropriate.      The rest of a comprehensive physical examination is deferred due to Public Health Emergency video visit restrictions.      Laboratory Data:     Reviewed  5/21/2021  CBC unremarkable  CMP unremarkable except Alk Phos 176    3/22/2021- ferritin 110 iron 32 TIBC 322 and iron sat 10.      2/8/2021.  EGD showed David en Y anatomy with generous pouch and stent bridging the GJ and proximal David   The stent was removed and a relook endoscopies showed widely patent prioximal David and associated superficial ulceration    5/21/2021.  CT chest abdomen and pelvis does not show evidence of recurrence of malignancy.      11/20/2020.  CT abdomen and  pelvis showed postsurgical changes of Whipple and subtotal gastrectomy with David-en-Y reconstruction.  There is resolution of previously demonstrated fluid collection between the stomach and the spleen and decrease in sigmoid thickening.  Small left pleural effusion with atelectasis adjacent to it.    12/7/2020.  EGD showed healthy-appearing and generous pouch (remaining the stomach) with widely patent end to side gastrojejunostomy, though with the David/enteral limb mildly stenostic and tortuous at its origin. Therefore a covered metal stent was placed from stomach to jejunum across the stenotic region, resulting in straightening of the course.    Assessment and Plan:    Gastric adenocarcinona, HER-2 negative.   - Started on neoadjuvant FLOT on 3/7/2020. She has required a dose reduction of her oxaliplatin by 30% due to cold sensitivity/neuropathy with cycle #5. 5FU was decreased to 85% with cycle #6 due to mucositis/diarrhea/taste changes.   Received C#8 on 8/14/2020.  PET/CT on 9/2/2020 did not show evidence of disease.  Small peritoneal nodule was stable.  This was most consistent with fat necrosis.    CT chest abdomen and pelvis on 9/23/2020 also does not show evidence of malignancy recurrence and the peritoneal nodule in the right upper quadrant is stable.    Before definite surgery she had diagnostic laparoscopy which was negative for evidence of malignancy so on 10/21/2020 she had exploratory laparotomy with extensive lysis of additions, partial omentectomy, resection of Gastrojejunal Anastomosis w/ En Bloc Subtotal Gastrectomy and modified D2 Lymphadenectomy with David-en-Y Gastrojejunostomy Reconstruction.  Final pathology showed Focal residual poorly differentiated adenocarcinoma (0.4cm) with signet-ring morphology status    post-neoadjuvant chemotherapy and all 9 lymph nodes were benign.  All margins were negative.  It was a [id number] lesion.    We decided to keep her on observation as she had received 6  months of neoadjuvant chemotherapy and then the definite surgery.  Plan to repeat his scans every 6 months and do EGD as needed.      She is doing very well and currently there is no evidence of recurrence.    I will plan to repeat CT chest abdomen and pelvis in 6 months.      She was followed in cancer survivorship program.    Abdominal pain.  This improved after EGD in December 2020 with a covered metal stent was placed from his stomach to jejunum across the stenotic region of David/enteral limb.  The stent was removed in February 2021.  Currently she denies any significant abdominal pain.  Continue to observe.      Anemia.  She had iron deficiency anemia and she received INFeD on 10/16/2020.  Now hemoglobin is normal.  She had still evidence of mild iron deficiency in March 2021.  My plan is to repeat labs including iron studies in 6 months.       Neuropathy.  She thinks that the neuropathy has improved in the fingers but she still has tingling and numbness of her feet.  She tried acupuncture once but it upset her nerves so she stopped that.  We again discussed considering laser therapy and she is looking into it.  She is on amitriptyline at night.  She also takes oxycodone for the nerve pain.  She is following with palliative care.       Port-A-Cath.  We discussed pros and cons of removing the Port-A-Cath and at this time we decided to keep it in at least for a total of 2 years from the completion of treatment and in the meantime she will get it flushed every 8 weeks.     Return to clinic in 6 months with labs/CT scan prior.    All questions answered and she is agreeable with the plan.    Magaly Pollard MD                  Again, thank you for allowing me to participate in the care of your patient.        Sincerely,        Magaly Pollard MD

## 2021-05-27 NOTE — PROGRESS NOTES
"Nathalie is a 55 year old who is being evaluated via a billable video visit.      How would you like to obtain your AVS? MyChart  If the video visit is dropped, the invitation should be resent by: Text to cell phone: [phone]  Will anyone else be joining your video visit? No      I have reviewed and updated the patient's allergies and medication list.    Concerns: No new concerns.   Refills: None needed.        Vitals - Patient Reported  Height (Patient Reported): 172.7 cm (5' 7.99\")  Pain Score: Mild Pain (3)  Pain Loc: (NEUROPATHY)    Astrid Couch CMA      Video Start Time: 10:28AM  Video-Visit Details    Type of service:  Video Visit    Video End Time:10:35 AM    Originating Location (pt. Location): Home    Distant Location (provider location):  [facility]     Platform used for Video Visit: Padmini  "

## 2021-06-01 ENCOUNTER — RECORDS - HEALTHEAST (OUTPATIENT)
Dept: ADMINISTRATIVE | Facility: CLINIC | Age: 56
End: 2021-06-01

## 2021-06-02 ENCOUNTER — RECORDS - HEALTHEAST (OUTPATIENT)
Dept: ADMINISTRATIVE | Facility: CLINIC | Age: 56
End: 2021-06-02

## 2021-06-03 ENCOUNTER — RECORDS - HEALTHEAST (OUTPATIENT)
Dept: ADMINISTRATIVE | Facility: CLINIC | Age: 56
End: 2021-06-03

## 2021-06-04 ENCOUNTER — RECORDS - HEALTHEAST (OUTPATIENT)
Dept: ADMINISTRATIVE | Facility: CLINIC | Age: 56
End: 2021-06-04

## 2021-06-05 ENCOUNTER — RECORDS - HEALTHEAST (OUTPATIENT)
Dept: ADMINISTRATIVE | Facility: CLINIC | Age: 56
End: 2021-06-05

## 2021-06-09 ENCOUNTER — PATIENT OUTREACH (OUTPATIENT)
Dept: PALLIATIVE CARE | Facility: CLINIC | Age: 56
End: 2021-06-09

## 2021-06-09 DIAGNOSIS — G89.18 POSTOPERATIVE PAIN: ICD-10-CM

## 2021-06-09 RX ORDER — OXYCODONE HYDROCHLORIDE 5 MG/1
5 TABLET ORAL 3 TIMES DAILY PRN
Qty: 90 TABLET | Refills: 0 | Status: SHIPPED | OUTPATIENT
Start: 2021-06-09 | End: 2021-07-14

## 2021-06-09 NOTE — PROGRESS NOTES
Received VM from patient requesting refill of oxycodone. She reports she is scheduled for her surgery July 8th and confirms plan is for surgeon to prescribe pain meds post op until she transfers back to palliative care MD.     Last refill: 5/12/2021  Last office visit: 3/4/2021  Scheduled for follow up 6/24/2021    Will route request to MD for review.     Reviewed MN  Report.

## 2021-06-24 ENCOUNTER — VIRTUAL VISIT (OUTPATIENT)
Dept: PALLIATIVE CARE | Facility: CLINIC | Age: 56
End: 2021-06-24
Attending: INTERNAL MEDICINE
Payer: MEDICARE

## 2021-06-24 DIAGNOSIS — T45.1X5A CINV (CHEMOTHERAPY-INDUCED NAUSEA AND VOMITING): Primary | ICD-10-CM

## 2021-06-24 DIAGNOSIS — R11.2 CINV (CHEMOTHERAPY-INDUCED NAUSEA AND VOMITING): Primary | ICD-10-CM

## 2021-06-24 PROCEDURE — 99213 OFFICE O/P EST LOW 20 MIN: CPT | Mod: 95 | Performed by: FAMILY MEDICINE

## 2021-06-24 PROCEDURE — 999N001193 HC VIDEO/TELEPHONE VISIT; NO CHARGE

## 2021-06-24 NOTE — PROGRESS NOTES
"Nathalie is a 55 year old who is being evaluated via a billable video visit.      How would you like to obtain your AVS? MyChart     If the video visit is dropped, the invitation should be resent by: Text to cell phone: [phone]     Will anyone else be joining your video visit? No        Vitals - Patient Reported  Weight (Patient Reported): 75.8 kg (167 lb)  Height (Patient Reported): 174 cm (5' 8.5\")  BMI (Based on Pt Reported Ht/Wt): 25.02  Pain Score: Moderate Pain (5)  Pain Loc: (FEET AND CALVES)    Christian Harepr LPN    Video Start Time: 4:01  Video-Visit Details    Type of service:  Video Visit    Video End Time:4:17    Originating Location (pt. Location): Home    Distant Location (provider location):  [facility]     Platform used for Video Visit: Miguel A  "

## 2021-06-24 NOTE — LETTER
"6/24/2021       RE: Nathalie Bashir  [address] [facility] [address]     Dear Colleague,    Thank you for referring your patient, Nathalie Bashir, to the [facility] CANCER CLINIC at [facility]. Please see a copy of my visit note below.    Nathalie is a 55 year old who is being evaluated via a billable video visit.      How would you like to obtain your AVS? MyChart     If the video visit is dropped, the invitation should be resent by: Text to cell phone: [phone]     Will anyone else be joining your video visit? No        Vitals - Patient Reported  Weight (Patient Reported): 75.8 kg (167 lb)  Height (Patient Reported): 174 cm (5' 8.5\")  BMI (Based on Pt Reported Ht/Wt): 25.02  Pain Score: Moderate Pain (5)  Pain Loc: (FEET AND CALVES)    Christian Harper LPN    Video Start Time: 4:01  Video-Visit Details    Type of service:  Video Visit    Video End Time:4:17    Originating Location (pt. Location): Home    Distant Location (provider location):  [facility] CANCER [facility]     Platform used for Video Visit: [facility]    Palliative Care Outpatient Clinic Progress Note    Patient Name:  Nathalie Bashir  Primary Provider:  Marianne Gonzalez    Chief Complaint: follow-up symptom management, neuropathy    Interim History:  Nathalie Bashir is a 55 year old female returns to be seen by palliative care today. She has a history significant for gastric adenocarcinoma found during Whipple in 1/2020 for presumed IPMN, no pancreatic malignancy found at that time. S/p neoadjuvant chemotherapy and surgery with curative intent on 10/21/20. Surgery was extensive and included partial omentectomy, resection of GJ anastomosis with en bloc subtotal gastrectomy, modified lymphadenectomy, and David-en-Y Gastrojejunostomy Reconstruction. She has since also undergone an upper GI endoscopy with stenting of the jejunum due to some mild stenosis following her " surgeries noted above. She has now undergone stent removal on 2021.  Now on surveillance plan.     Nathalie states that overall she has been doing well.  She feels that every day is a bit better which has been encouraging for her.  She continues to have difficulty with neuropathy in bilateral feet and lower extremities as well as bilateral hands (although this is improving somewhat). Her symptoms are currently well controlled with use of oxycodone 5 mg TID PRN.  She takes this 2-3 times per day on average. She was encouraged by oncology to consider an alternative therapy for neuropathy which she is looking into. She otherwise denies bothersome symptoms.  Appetite has been strong.  Has had some intermittent constipation which has resolved.     She has right foot arthritis and has an upcoming surgery for right 1st, 2nd, and 3rd toe fusions on 21. She is looking forward to a vacation to [facility] over the .     Coping:  Coping well, encouraged by continued small improvements.     Social History:  Pertinent changes to social history/social situation since last visit: None  Key support resources:  Ti  Advance Directive Status:  Not on file  Social History     Tobacco Use     Smoking status: Former Smoker     Packs/day: 0.50     Years: 25.00     Pack years: 12.50     Types: Cigarettes     Quit date: 2020     Years since quittin.4     Smokeless tobacco: Never Used   Substance Use Topics     Alcohol use: No     Drug use: No         Allergies   Allergen Reactions     Gluten Meal Palpitations     Augmentin Rash     Patient tolerated Zosyn in 2020     Oxycontin [Oxycodone]      Confusion, loopy, oxycodone is tolerated     Pregabalin      interfered with Cymbalta     Topiramate      Tongue numbness, taste alteration, irritable      Acetaminophen Nausea     Urine retention       Dust Mite Extract      Gabapentin Dizziness and GI Disturbance     Ketorolac Headache     Latex Rash      Methadone Fatigue     Mold      Naprosyn [Naproxen] Dizziness and GI Disturbance     Seasonal Allergies      Current Outpatient Medications   Medication Sig Dispense Refill     amitriptyline (ELAVIL) 100 MG tablet Take 1 tablet (100 mg) by mouth At Bedtime 30 tablet 3     amylase-lipase-protease (CREON 24) 41803-38174 units CPEP per EC capsule 2 capsules with meals and 1 capsule with snacks 240 capsule 4     calcium carbonate 500 mg, elemental, (OSCAL 500) 1250 (500 Ca) MG TABS tablet Take by mouth 3 times daily       citalopram (CELEXA) 20 MG tablet Take 20 mg by mouth every morning        Cyanocobalamin 1000 MCG CAPS Take by mouth daily       cyclobenzaprine (FLEXERIL) 10 MG tablet Take 10 mg by mouth 3 times daily as needed for muscle spasms       famotidine (PEPCID) 20 MG tablet Take 20 mg by mouth 2 times daily as needed        fluticasone (FLONASE) 50 MCG/ACT nasal spray Spray 1 spray into both nostrils daily       hydrOXYzine (VISTARIL) 25 MG capsule Take 25-50 mg by mouth 4 times daily as needed for itching       ibuprofen (ADVIL/MOTRIN) 600 MG tablet Take 1 tablet (600 mg) by mouth every 6 hours as needed for moderate pain 50 tablet 0     lidocaine (XYLOCAINE) 2 % solution Swish and swallow 15 mLs in mouth every 3 hours as needed for moderate pain ; Max 8 doses/24 hour period. 100 mL 0     loperamide (IMODIUM) 2 MG capsule Take 4 mg by mouth 4 times daily as needed for diarrhea        loratadine-pseudoePHEDrine (CLARITIN-D 24-HOUR)  MG 24 hr tablet Take 1 tablet by mouth daily as needed        magnesium 250 MG tablet Take 1 tablet by mouth daily       metoclopramide (REGLAN) 5 MG tablet Take 5 mg by mouth 2 times daily as needed       multivitamin w/minerals (MULTI-VITAMIN) tablet Take 1 tablet by mouth daily       ondansetron (ZOFRAN) 8 MG tablet Take 1 tablet (8 mg) by mouth every 8 hours as needed (Nausea/Vomiting) 90 tablet 2     oxyCODONE (ROXICODONE) 5 MG tablet Take 1 tablet (5 mg) by mouth 3  times daily as needed for moderate to severe pain Maximum 3 pills/day. 90 tablet 0     pantoprazole (PROTONIX) 40 MG EC tablet Take 1 tablet (40 mg) by mouth 2 times daily 60 tablet 11     potassium chloride ER (KLOR-CON M) 20 MEQ CR tablet TAKE ONE TABLET BY MOUTH ONCE DAILY (Patient taking differently: daily as needed ) 30 tablet 1     prochlorperazine (COMPAZINE) 10 MG tablet Take 1 tablet (10 mg) by mouth every 6 hours as needed for nausea or vomiting 60 tablet 3     simethicone 80 MG TABS Take 1 tablet by mouth every 6 hours as needed (bloating, gas, belching) 90 tablet 3     sucralfate (CARAFATE) 1 GM tablet Take 1 tablet (1 g) by mouth 4 times daily as needed (reflux.) 360 tablet 6     SUMAtriptan (IMITREX) 100 MG tablet Take 100 mg by mouth at onset of headache for migraine       Past Medical History:   Diagnosis Date     Allergic rhinitis      Cancer (H)     stomach cancer     Chronic pain      Depression      Gastric adenocarcinoma (H)      Lumbago      Migraine      Opioid dependence (H)      Other chronic pain     low back     Sacroiliitis (H)     low back pain     Trochanteric bursitis     leg length discrrepancy, hip pain     Past Surgical History:   Procedure Laterality Date     ARTHROPLASTY HIP Left 2016     BACK SURGERY      L4-5 decompression     C REPAIR DETACH RETINA,SCLERAL BUCKLE       CATARACT IOL, RT/LT       CHOLECYSTECTOMY N/A 1/28/2020    Procedure: Cholecystectomy;  Surgeon: Yandel Mallory MD;  Location:  OR     ENDOSCOPIC RETROGRADE CHOLANGIOPANCREATOGRAM N/A 7/27/2020    Procedure: ENDOSCOPIC RETROGRADE CHOLANGIOPANCREATOGRAPHY  **Latex Allergy** with jejunal biopsies;  Surgeon: Jeet Aviles MD;  Location:  OR     ESOPHAGOSCOPY, GASTROSCOPY, DUODENOSCOPY (EGD), COMBINED N/A 3/3/2020    Procedure: ESOPHAGOGASTRODUODENOSCOPY, WITH ENDOSCOPIC US (enoxaparin);  Surgeon: Bakari Plata MD;  Location:  GI     ESOPHAGOSCOPY, GASTROSCOPY, DUODENOSCOPY (EGD), COMBINED N/A  12/7/2020    Procedure: Upper Endoscopy with stent placement;  Surgeon: Jeet Aviles MD;  Location:  OR     ESOPHAGOSCOPY, GASTROSCOPY, DUODENOSCOPY (EGD), COMBINED N/A 2/8/2021    Procedure: ESOPHAGOGASTRODUODENOSCOPY (EGD)AND STENT REMOVAL;  Surgeon: Jeet Aviles MD;  Location:  OR     EYE SURGERY       GASTRECTOMY N/A 10/21/2020    Procedure: Open subtotal gastectomy with David en Y Reconstruction; D1 Lymph Node Disection  **Latex Allergy**;  Surgeon: Yandel Mallory MD;  [facility]     IR CHEST PORT PLACEMENT > 5 YRS OF AGE  3/26/2020     IR LUMBAR EPIDURAL STEROID INJECTION  8/25/2009     IR LUMBAR EPIDURAL STEROID INJECTION  9/2/2009     IR LUMBAR EPIDURAL STEROID INJECTION  9/25/2009     IR LUMBAR EPIDURAL STEROID INJECTION  11/23/2009     IR LUMBAR EPIDURAL STEROID INJECTION  4/1/2010     IR LUMBAR EPIDURAL STEROID INJECTION  9/1/2010     IR LUMBAR EPIDURAL STEROID INJECTION  3/10/2011     IR LUMBAR EPIDURAL STEROID INJECTION  8/30/2011     LAPAROSCOPY DIAGNOSTIC (GENERAL) N/A 10/13/2020    Procedure: Diagnostic laparoscopy with peritoneal washings  **Latex Allergy**;  Surgeon: Yandel Mallory MD;  [facility]     OPEN REDUCTION INTERNAL FIXATION RODDING INTRAMEDULLARY FEMUR Left 12/23/2014    Procedure: OPEN REDUCTION INTERNAL FIXATION RODDING INTRAMEDULLARY FEMUR;  Surgeon: Arnol Santiago MD;  Location:  OR     ORTHOPEDIC SURGERY       PICC DOUBLE LUMEN PLACEMENT Right 02/07/2020    5 fr double lumen 41 cm     REMOVE HARDWARE LOWER EXTREMITY Left 4/7/2015    Procedure: REMOVE HARDWARE LOWER EXTREMITY;  Surgeon: Arnol Santiago MD;  [facility]     REMOVE HARDWARE RODDING INTRAMEDULLARY FEMUR Left 12/17/2015    Procedure: REMOVE HARDWARE RODDING INTRAMEDULLARY FEMUR;  Surgeon: Arnol Santiago MD;  [facility]     RESECT BONE LOWER EXTREMITY Left 12/23/2014    Procedure: RESECT BONE LOWER EXTREMITY;  Surgeon: Arnol Santiago MD;  [facility]      WHIPPLE PROCEDURE N/A 1/28/2020    Procedure: Open Whipple procedure and Cholecystectomy;  Surgeon: Yandel Mallory MD;  [facility]     WHIPPLE PROCEDURE         Review of Systems:   ROS: 10 point ROS neg other than the symptoms noted above in the HPI and pertinents here:  Palliative Symptom Review (0=no symptom/no concern, 1=mild, 2=moderate, 3=severe):      Pain: 1      Fatigue: 0      Nausea: 0      Constipation: 1      Diarrhea: 0      Depressive Symptoms: 0      Anxiety: 0      Drowsiness: 0      Poor Appetite: 0      Shortness of Breath: 0      Insomnia: 0      Other: 0      Overall (0 good/no concerns, 3 very poor):  1           Physical Exam:   Nathalie was seen by video visit today.  She is resting comfortably sitting on her couch at time of evaluation.  In no acute distress.  Non-labored breathing. Alert, interactive, responds to all questions appropriately.  Normal mood & affect.     Key Data Reviewed:  LABS: 5/21/21 Cr 0.93, GFR 70, Alk Phos 176, hemoglobin 12.2, platelets 197  IMAGING: CT Chest/Abd/Pelvis 5/21/21:                                                                  IMPRESSION:  No evidence of recurrent or metastatic malignancy in the  chest, abdomen, or pelvis.    Impression & Recommendations & Counseling:  Nathalie Bashir is a 55 year old female with a history of gastric adenocarcinoma found during Whipple in 1/2020 for presumed IPMN, no pancreatic malignancy found at that time now s/p neoadjuvant chemotherapy and surgery with curative intent on 10/21/20 s/p upper GI endoscopy with stenting of the jejunum due to some mild stenosis following her surgeries, now s/p stent removal on 2/8/2021. She was evaluated today primarily symptom management in regards to her peripheral neuropathy.     Her symptoms have remained stable and fairly well controlled with use of oxycodone 5 mg TID PRN.  She has an upcoming surgery on her right foot for 1st, 2nd, and 3rd toe fusion.  Her surgery team will  plan to manage post operative pain management.  She will follow-up in approximately 3 months, or sooner if needed.     Al Marie DO  Palliative Medicine Fellow  Staffed with Dr. Scott    Attending attestation:   Patient seen and evaluated with Dr. Marie and I agree with findings and recs in this note.   Thank you for involving us in the patient's care.   Precious Scott MD / Palliative Medicine / Pager [phone] / After-Hours Answering Service [phone] / [facility] - [facility] [phone] / [facility] Inpatient Team Consult Pager [phone] (answered 8am-430pm M-F)              Again, thank you for allowing me to participate in the care of your patient.      Sincerely,    Al Marie MD

## 2021-06-24 NOTE — PROGRESS NOTES
Palliative Care Outpatient Clinic Progress Note    Patient Name:  Nathalie Bashir  Primary Provider:  Marianne Gonzalez    Chief Complaint: follow-up symptom management, neuropathy    Interim History:  Nathalie Bashir is a 55 year old female returns to be seen by palliative care today. She has a history significant for gastric adenocarcinoma found during Whipple in 1/2020 for presumed IPMN, no pancreatic malignancy found at that time. S/p neoadjuvant chemotherapy and surgery with curative intent on 10/21/20. Surgery was extensive and included partial omentectomy, resection of GJ anastomosis with en bloc subtotal gastrectomy, modified lymphadenectomy, and David-en-Y Gastrojejunostomy Reconstruction. She has since also undergone an upper GI endoscopy with stenting of the jejunum due to some mild stenosis following her surgeries noted above. She has now undergone stent removal on 2/8/2021.  Now on surveillance plan.     Nathalie states that overall she has been doing well.  She feels that every day is a bit better which has been encouraging for her.  She continues to have difficulty with neuropathy in bilateral feet and lower extremities as well as bilateral hands (although this is improving somewhat). Her symptoms are currently well controlled with use of oxycodone 5 mg TID PRN.  She takes this 2-3 times per day on average. She was encouraged by oncology to consider an alternative therapy for neuropathy which she is looking into. She otherwise denies bothersome symptoms.  Appetite has been strong.  Has had some intermittent constipation which has resolved.     She has right foot arthritis and has an upcoming surgery for right 1st, 2nd, and 3rd toe fusions on 7/8/21. She is looking forward to a vacation to [facility] over the 4th of July.     Coping:  Coping well, encouraged by continued small improvements.     Social History:  Pertinent changes to social history/social situation since last visit: None  Key support  resources:  Ti  Advance Directive Status:  Not on file  Social History     Tobacco Use     Smoking status: Former Smoker     Packs/day: 0.50     Years: 25.00     Pack years: 12.50     Types: Cigarettes     Quit date: 2020     Years since quittin.4     Smokeless tobacco: Never Used   Substance Use Topics     Alcohol use: No     Drug use: No         Allergies   Allergen Reactions     Gluten Meal Palpitations     Augmentin Rash     Patient tolerated Zosyn in 2020     Oxycontin [Oxycodone]      Confusion, loopy, oxycodone is tolerated     Pregabalin      interfered with Cymbalta     Topiramate      Tongue numbness, taste alteration, irritable      Acetaminophen Nausea     Urine retention       Dust Mite Extract      Gabapentin Dizziness and GI Disturbance     Ketorolac Headache     Latex Rash     Methadone Fatigue     Mold      Naprosyn [Naproxen] Dizziness and GI Disturbance     Seasonal Allergies      Current Outpatient Medications   Medication Sig Dispense Refill     amitriptyline (ELAVIL) 100 MG tablet Take 1 tablet (100 mg) by mouth At Bedtime 30 tablet 3     amylase-lipase-protease (CREON 24) 65782-26635 units CPEP per EC capsule 2 capsules with meals and 1 capsule with snacks 240 capsule 4     calcium carbonate 500 mg, elemental, (OSCAL 500) 1250 (500 Ca) MG TABS tablet Take by mouth 3 times daily       citalopram (CELEXA) 20 MG tablet Take 20 mg by mouth every morning        Cyanocobalamin 1000 MCG CAPS Take by mouth daily       cyclobenzaprine (FLEXERIL) 10 MG tablet Take 10 mg by mouth 3 times daily as needed for muscle spasms       famotidine (PEPCID) 20 MG tablet Take 20 mg by mouth 2 times daily as needed        fluticasone (FLONASE) 50 MCG/ACT nasal spray Spray 1 spray into both nostrils daily       hydrOXYzine (VISTARIL) 25 MG capsule Take 25-50 mg by mouth 4 times daily as needed for itching       ibuprofen (ADVIL/MOTRIN) 600 MG tablet Take 1 tablet (600 mg) by mouth every 6 hours as  needed for moderate pain 50 tablet 0     lidocaine (XYLOCAINE) 2 % solution Swish and swallow 15 mLs in mouth every 3 hours as needed for moderate pain ; Max 8 doses/24 hour period. 100 mL 0     loperamide (IMODIUM) 2 MG capsule Take 4 mg by mouth 4 times daily as needed for diarrhea        loratadine-pseudoePHEDrine (CLARITIN-D 24-HOUR)  MG 24 hr tablet Take 1 tablet by mouth daily as needed        magnesium 250 MG tablet Take 1 tablet by mouth daily       metoclopramide (REGLAN) 5 MG tablet Take 5 mg by mouth 2 times daily as needed       multivitamin w/minerals (MULTI-VITAMIN) tablet Take 1 tablet by mouth daily       ondansetron (ZOFRAN) 8 MG tablet Take 1 tablet (8 mg) by mouth every 8 hours as needed (Nausea/Vomiting) 90 tablet 2     oxyCODONE (ROXICODONE) 5 MG tablet Take 1 tablet (5 mg) by mouth 3 times daily as needed for moderate to severe pain Maximum 3 pills/day. 90 tablet 0     pantoprazole (PROTONIX) 40 MG EC tablet Take 1 tablet (40 mg) by mouth 2 times daily 60 tablet 11     potassium chloride ER (KLOR-CON M) 20 MEQ CR tablet TAKE ONE TABLET BY MOUTH ONCE DAILY (Patient taking differently: daily as needed ) 30 tablet 1     prochlorperazine (COMPAZINE) 10 MG tablet Take 1 tablet (10 mg) by mouth every 6 hours as needed for nausea or vomiting 60 tablet 3     simethicone 80 MG TABS Take 1 tablet by mouth every 6 hours as needed (bloating, gas, belching) 90 tablet 3     sucralfate (CARAFATE) 1 GM tablet Take 1 tablet (1 g) by mouth 4 times daily as needed (reflux.) 360 tablet 6     SUMAtriptan (IMITREX) 100 MG tablet Take 100 mg by mouth at onset of headache for migraine       Past Medical History:   Diagnosis Date     Allergic rhinitis      Cancer (H)     stomach cancer     Chronic pain      Depression      Gastric adenocarcinoma (H)      Lumbago      Migraine      Opioid dependence (H)      Other chronic pain     low back     Sacroiliitis (H)     low back pain     Trochanteric bursitis     leg  length discrrepancy, hip pain     Past Surgical History:   Procedure Laterality Date     ARTHROPLASTY HIP Left 2016     BACK SURGERY      L4-5 decompression     C REPAIR DETACH RETINA,SCLERAL BUCKLE       CATARACT IOL, RT/LT       CHOLECYSTECTOMY N/A 1/28/2020    Procedure: Cholecystectomy;  Surgeon: Yandel Mallory MD;  [facility]     ENDOSCOPIC RETROGRADE CHOLANGIOPANCREATOGRAM N/A 7/27/2020    Procedure: ENDOSCOPIC RETROGRADE CHOLANGIOPANCREATOGRAPHY  **Latex Allergy** with jejunal biopsies;  Surgeon: Jeet Aviles MD;  [facility]     ESOPHAGOSCOPY, GASTROSCOPY, DUODENOSCOPY (EGD), COMBINED N/A 3/3/2020    Procedure: ESOPHAGOGASTRODUODENOSCOPY, WITH ENDOSCOPIC US (enoxaparin);  Surgeon: Bakari Plata MD;  [facility]     ESOPHAGOSCOPY, GASTROSCOPY, DUODENOSCOPY (EGD), COMBINED N/A 12/7/2020    Procedure: Upper Endoscopy with stent placement;  Surgeon: Jeet Aviles MD;  Location:  OR     ESOPHAGOSCOPY, GASTROSCOPY, DUODENOSCOPY (EGD), COMBINED N/A 2/8/2021    Procedure: ESOPHAGOGASTRODUODENOSCOPY (EGD)AND STENT REMOVAL;  Surgeon: Jeet Aviles MD;  Location:  OR     EYE SURGERY       GASTRECTOMY N/A 10/21/2020    Procedure: Open subtotal gastectomy with David en Y Reconstruction; D1 Lymph Node Disection  **Latex Allergy**;  Surgeon: Yandel Mallory MD;  [facility]     IR CHEST PORT PLACEMENT > 5 YRS OF AGE  3/26/2020     IR LUMBAR EPIDURAL STEROID INJECTION  8/25/2009     IR LUMBAR EPIDURAL STEROID INJECTION  9/2/2009     IR LUMBAR EPIDURAL STEROID INJECTION  9/25/2009     IR LUMBAR EPIDURAL STEROID INJECTION  11/23/2009     IR LUMBAR EPIDURAL STEROID INJECTION  4/1/2010     IR LUMBAR EPIDURAL STEROID INJECTION  9/1/2010     IR LUMBAR EPIDURAL STEROID INJECTION  3/10/2011     IR LUMBAR EPIDURAL STEROID INJECTION  8/30/2011     LAPAROSCOPY DIAGNOSTIC (GENERAL) N/A 10/13/2020    Procedure: Diagnostic laparoscopy with peritoneal washings  **Latex Allergy**;   Surgeon: Yandel Mallory MD;  [facility]     OPEN REDUCTION INTERNAL FIXATION RODDING INTRAMEDULLARY FEMUR Left 12/23/2014    Procedure: OPEN REDUCTION INTERNAL FIXATION RODDING INTRAMEDULLARY FEMUR;  Surgeon: Arnol Santiago MD;  [facility]     ORTHOPEDIC SURGERY       PICC DOUBLE LUMEN PLACEMENT Right 02/07/2020    5 fr double lumen 41 cm     REMOVE HARDWARE LOWER EXTREMITY Left 4/7/2015    Procedure: REMOVE HARDWARE LOWER EXTREMITY;  Surgeon: Arnol Santiago MD;  [facility]     REMOVE HARDWARE RODDING INTRAMEDULLARY FEMUR Left 12/17/2015    Procedure: REMOVE HARDWARE RODDING INTRAMEDULLARY FEMUR;  Surgeon: Arnol Santiago MD;  [facility]     RESECT BONE LOWER EXTREMITY Left 12/23/2014    Procedure: RESECT BONE LOWER EXTREMITY;  Surgeon: Arnol Santiago MD;  [facility]     WHIPPLE PROCEDURE N/A 1/28/2020    Procedure: Open Whipple procedure and Cholecystectomy;  Surgeon: Yandel Mallory MD;  [facility]     WHIPPLE PROCEDURE         Review of Systems:   ROS: 10 point ROS neg other than the symptoms noted above in the HPI and pertinents here:  Palliative Symptom Review (0=no symptom/no concern, 1=mild, 2=moderate, 3=severe):      Pain: 1      Fatigue: 0      Nausea: 0      Constipation: 1      Diarrhea: 0      Depressive Symptoms: 0      Anxiety: 0      Drowsiness: 0      Poor Appetite: 0      Shortness of Breath: 0      Insomnia: 0      Other: 0      Overall (0 good/no concerns, 3 very poor):  1           Physical Exam:   Nathalie was seen by video visit today.  She is resting comfortably sitting on her couch at time of evaluation.  In no acute distress.  Non-labored breathing. Alert, interactive, responds to all questions appropriately.  Normal mood & affect.     Key Data Reviewed:  LABS: 5/21/21 Cr 0.93, GFR 70, Alk Phos 176, hemoglobin 12.2, platelets 197  IMAGING: CT Chest/Abd/Pelvis 5/21/21:                                                                  IMPRESSION:  No  evidence of recurrent or metastatic malignancy in the  chest, abdomen, or pelvis.    Impression & Recommendations & Counseling:  Nathalie Bashir is a 55 year old female with a history of gastric adenocarcinoma found during Whipple in 1/2020 for presumed IPMN, no pancreatic malignancy found at that time now s/p neoadjuvant chemotherapy and surgery with curative intent on 10/21/20 s/p upper GI endoscopy with stenting of the jejunum due to some mild stenosis following her surgeries, now s/p stent removal on 2/8/2021. She was evaluated today primarily symptom management in regards to her peripheral neuropathy.     Her symptoms have remained stable and fairly well controlled with use of oxycodone 5 mg TID PRN.  She has an upcoming surgery on her right foot for 1st, 2nd, and 3rd toe fusion.  Her surgery team will plan to manage post operative pain management.  She will follow-up in approximately 3 months, or sooner if needed.     Al Marie DO  Palliative Medicine Fellow  Staffed with Dr. Scott    Attending attestation:   Patient seen and evaluated with Dr. Marie and I agree with findings and recs in this note.   Thank you for involving us in the patient's care.   Precious Scott MD / Palliative Medicine / Pager [phone] / After-Hours Answering Service [phone] / Main Palliative Clinic - [facility] [phone] / [facility] Inpatient Team Consult Pager [phone] (answered 8am-430pm M-F)

## 2021-07-01 PROCEDURE — 250N000011 HC RX IP 250 OP 636: Performed by: INTERNAL MEDICINE

## 2021-07-01 PROCEDURE — 96523 IRRIG DRUG DELIVERY DEVICE: CPT

## 2021-07-01 RX ORDER — HEPARIN SODIUM (PORCINE) LOCK FLUSH IV SOLN 100 UNIT/ML 100 UNIT/ML
5 SOLUTION INTRAVENOUS
Status: DISCONTINUED | OUTPATIENT
Start: 2021-07-01 | End: 2021-07-09 | Stop reason: HOSPADM

## 2021-07-01 RX ADMIN — Medication 5 ML: at 10:14

## 2021-07-01 NOTE — NURSING NOTE
Chief Complaint   Patient presents with     Port Flush     Pt here for Right Chest Port-a-Cath flush only. No labs drawn. Port flushed by RN in Lab with Saline and Heparin.      Tessie Rader RN

## 2021-07-14 ENCOUNTER — MYC REFILL (OUTPATIENT)
Dept: PALLIATIVE CARE | Facility: CLINIC | Age: 56
End: 2021-07-14

## 2021-07-14 DIAGNOSIS — G89.18 POSTOPERATIVE PAIN: ICD-10-CM

## 2021-07-15 RX ORDER — OXYCODONE HYDROCHLORIDE 5 MG/1
5 TABLET ORAL 3 TIMES DAILY PRN
Qty: 90 TABLET | Refills: 0 | Status: SHIPPED | OUTPATIENT
Start: 2021-07-15 | End: 2021-08-10

## 2021-07-15 NOTE — TELEPHONE ENCOUNTER
Received mychart request from patient for refill of oxycodone. In addition to mychart, received VM from patient letting us know surgery did go well. She states she only needed to get one refill from her surgeon. Feels things are going well now and would like to go back to her usual dose.     Spoke with her - she tells me that she is doing better now. She reports that she had to take oxycodone every 4 hours for 3 days at least, but feels like ready to go back down to #3 tabs now. She says she has enough to last until tomorrow. She also reports that her foot feels a lot better and is getting better daily now.     Last refill: 6/9/2021 from our team, last refill from surgeon per MN  7/8, #35 oxycodone 5mg   Last office visit: 6/24/2021  Message sent to  for RTN apt.     Will route request to MD for review.     Reviewed MN  Report.

## 2021-08-03 RX ORDER — HEPARIN SODIUM (PORCINE) LOCK FLUSH IV SOLN 100 UNIT/ML 100 UNIT/ML
5 SOLUTION INTRAVENOUS
Status: CANCELLED | OUTPATIENT
Start: 2021-08-03

## 2021-08-03 RX ORDER — HEPARIN SODIUM,PORCINE 10 UNIT/ML
5 VIAL (ML) INTRAVENOUS
Status: CANCELLED | OUTPATIENT
Start: 2021-08-03

## 2021-08-09 NOTE — PROGRESS NOTES
"Review Of Systems  Skin: negative  Eyes: negative  Ears/Nose/Throat: negative  Respiratory: No shortness of breath, dyspnea on exertion, cough, or hemoptysis  Cardiovascular: Lightheaedness  Gastrointestinal: negative  Genitourinary: negative  Musculoskeletal: negative  Neurologic: migraine headaches  Psychiatric: negative  Hematologic/Lymphatic/Immunologic: negative  Endocrine: negative    Vitals - Patient Reported  Systolic (Patient Reported): 98  Diastolic (Patient Reported): 55  Weight (Patient Reported): 75.8 kg (167 lb)  Height (Patient Reported): 174 cm (5' 8.5\")  BMI (Based on Pt Reported Ht/Wt): 25.02  Pulse (Patient Reported): 100    Reviewed:  COREEN Palmer  08/10/2021    Nathalie Bashir is a 55 year old who is being evaluated via a billable video visit.      How would you like to obtain your AVS? MyChart  If the video visit is dropped, the invitation should be resent by: Text to cell phone: [phone]  Will anyone else be joining your video visit? No    Video Start Time: 10:12  Video-Visit Details    Type of service:  Video Visit    Video End Time:10:40    Originating Location (pt. Location): Home    Distant Location (provider location):  [facility]     Platform used for Video Visit: [facility]    Palliative Care Outpatient Clinic Progress Note    Patient Name:  Nathalie Bashir  Primary Provider:  Marianne Gonzalez    Chief Complaint/Patient ID:   Nathalie Bashir is a 56yo F with gastric adenocarcinoma   - Found during Whipple 1/2020 for presumed IPMN - no pancreatic malignancy found, but had poorly differentiated adenoca.  Received neoadjuvant chemo (5FU, leucovorin, oxaliplatin, docetaxel) with curative-intent subtotal gastrectomy, David-en-Y gastrojejunostomy 10/2020.  Required stenting of the jejunum for stenosis causing pain, removed 2/8/21.   Chronic and chemo-induced neuropathy - intol of gabapentin, pregabalin, duloxetine not effective, on TID oxycodone    Last Palliative Care " Appointment: 21    Interim History:  Nathalie Bashir 55 year old female returns to be seen by palliative care today.  Video visit performed due to coronavirus.  She is alone today.    She is overall doing well, says she feels more back to normal.  Foot surgery went well, now in a walking cast.  Has been noting more neuropathy in her feet, feels like it sometimes goes up to her knee.  Has been taking nightly amitriptyline and oxycodone 5 mg 3 times a day.    We talked about her experience with duloxetine -it was started back in the 90s when she was having depression in the setting of chronic pain caused by her left leg being 1 inch longer, which improved after surgery.  She was on Cymbalta for several years and then was switched to citalopram about 5 years ago because felt the Cymbalta was no longer helping her mood.  She feels her mood has been stable for the last few years and overall doing better since her cancer treatment and surgery.  She would consider trying Cymbalta again to help with the neuropathy.    Appetite has been good, thinks she has regained about 24 pounds.  No nausea or pain with eating, but does have to watch the amount she eats.     Social History:  Pertinent changes to social history/social situation since last visit: None  Lives with her , son, and 3 grandchildren  Retired, worked in special education  Advance Directive Status:  Has not completed  Social History     Tobacco Use     Smoking status: Former Smoker     Packs/day: 0.50     Years: 25.00     Pack years: 12.50     Types: Cigarettes     Quit date: 2020     Years since quittin.6     Smokeless tobacco: Never Used   Substance Use Topics     Alcohol use: No     Drug use: No         Allergies   Allergen Reactions     Gluten Meal Palpitations     Augmentin Rash     Patient tolerated Zosyn in 2020     Oxycontin [Oxycodone]      Confusion, loopy, oxycodone is tolerated     Pregabalin      interfered with Cymbalta      Topiramate      Tongue numbness, taste alteration, irritable      Acetaminophen Nausea     Urine retention       Dust Mite Extract      Gabapentin Dizziness and GI Disturbance     Ketorolac Headache     Latex Rash     Methadone Fatigue     Mold      Naprosyn [Naproxen] Dizziness and GI Disturbance     Seasonal Allergies      Current Outpatient Medications   Medication Sig Dispense Refill     amitriptyline (ELAVIL) 100 MG tablet Take 1 tablet (100 mg) by mouth At Bedtime 30 tablet 3     amylase-lipase-protease (CREON 24) 24010-56796 units CPEP per EC capsule 2 capsules with meals and 1 capsule with snacks 240 capsule 4     calcium carbonate 500 mg, elemental, (OSCAL 500) 1250 (500 Ca) MG TABS tablet Take by mouth 3 times daily       citalopram (CELEXA) 20 MG tablet Take 20 mg by mouth every morning        Cyanocobalamin 1000 MCG CAPS Take by mouth daily       cyclobenzaprine (FLEXERIL) 10 MG tablet Take 10 mg by mouth 3 times daily as needed for muscle spasms       famotidine (PEPCID) 20 MG tablet Take 20 mg by mouth 2 times daily as needed        fluticasone (FLONASE) 50 MCG/ACT nasal spray Spray 1 spray into both nostrils daily       hydrOXYzine (VISTARIL) 25 MG capsule Take 25-50 mg by mouth 4 times daily as needed for itching       ibuprofen (ADVIL/MOTRIN) 600 MG tablet Take 1 tablet (600 mg) by mouth every 6 hours as needed for moderate pain 50 tablet 0     lidocaine (XYLOCAINE) 2 % solution Swish and swallow 15 mLs in mouth every 3 hours as needed for moderate pain ; Max 8 doses/24 hour period. 100 mL 0     loperamide (IMODIUM) 2 MG capsule Take 4 mg by mouth 4 times daily as needed for diarrhea        loratadine-pseudoePHEDrine (CLARITIN-D 24-HOUR)  MG 24 hr tablet Take 1 tablet by mouth daily as needed        magnesium 250 MG tablet Take 1 tablet by mouth daily       metoclopramide (REGLAN) 5 MG tablet Take 5 mg by mouth 2 times daily as needed       multivitamin w/minerals (MULTI-VITAMIN) tablet Take 1  tablet by mouth daily       ondansetron (ZOFRAN) 8 MG tablet Take 1 tablet (8 mg) by mouth every 8 hours as needed (Nausea/Vomiting) 90 tablet 2     oxyCODONE (ROXICODONE) 5 MG tablet Take 1 tablet (5 mg) by mouth 3 times daily as needed for moderate to severe pain Maximum 3 pills/day. 90 tablet 0     pantoprazole (PROTONIX) 40 MG EC tablet Take 1 tablet (40 mg) by mouth 2 times daily 60 tablet 11     potassium chloride ER (KLOR-CON M) 20 MEQ CR tablet TAKE ONE TABLET BY MOUTH ONCE DAILY (Patient taking differently: daily as needed ) 30 tablet 1     prochlorperazine (COMPAZINE) 10 MG tablet Take 1 tablet (10 mg) by mouth every 6 hours as needed for nausea or vomiting 60 tablet 3     simethicone 80 MG TABS Take 1 tablet by mouth every 6 hours as needed (bloating, gas, belching) 90 tablet 3     sucralfate (CARAFATE) 1 GM tablet Take 1 tablet (1 g) by mouth 4 times daily as needed (reflux.) 360 tablet 6     SUMAtriptan (IMITREX) 100 MG tablet Take 100 mg by mouth at onset of headache for migraine       Palliative Symptom Review (0=no symptom/no concern, 1=mild, 2=moderate, 3=severe):      Pain:   1-2      Fatigue: 1      Nausea: 1      Constipation: 0      Diarrhea: 1      Depressive Symptoms: 0      Anxiety: 0      Drowsiness: 0      Poor Appetite: 1      Shortness of Breath: 0      Insomnia: 0      Other: 0      Overall (0 good/no concerns, 3 very poor):  1    GENERAL: Comfortable-appearing, alert and no distress.   RESP: no audible wheeze, cough, or visible cyanosis.  No increased work of breathing on RA.  Able to speak fully in complete sentences.  NEURO: Cranial nerves grossly intact, mentation intact and speech normal.  No tremor.   PSYCH: mentation appears normal, affect normal/bright, judgement and insight intact, normal speech and appearance well-groomed    Wt Readings from Last 10 Encounters:   03/22/21 71 kg (156 lb 8 oz)   02/08/21 64.7 kg (142 lb 9.6 oz)   02/04/21 66.2 kg (146 lb)   12/07/20 64.6 kg (142  "lb 8 oz)   20 70.8 kg (156 lb 1.6 oz)   20 72.6 kg (160 lb)   10/25/20 74 kg (163 lb 3.2 oz)   10/16/20 75.6 kg (166 lb 9.6 oz)   10/13/20 74.6 kg (164 lb 7.4 oz)   20 78 kg (172 lb)       Key Data Reviewed:  LABS:   Recent Labs   Lab Test 21  1040 21  1202    137   POTASSIUM 4.2 3.3*   CHLORIDE 107 105   CO2 29 26   ANIONGAP 3 6   * 109*   BUN 12 13   CR 0.93 0.77   PETRA 8.5 8.2*     GFR 70  Alb 3.4  Hb 12.2    IMAGING:   No new imaging     Impression & Recommendations & Counseling:  Nathalie Bashir is a 56yo F w history of gastric adenocarcinoma found during Whipple 2020 for presumed IPMN, no pancreatic malignancy found at that time, now s/p neoadjuvant chemotherapy and curative intent surgery 10/2020.  Is now on surveillance and feeling more back to normal.  Seen for symptom management of peripheral neuropathy that worsened in the setting of chemo.      - will stop citalopram and start duloxetine 30 mg daily.  This could be uptitrated.  Will monitor mood   - continue oxycodone 5 mg TID - traveling for 1 week starting tomorrow for family  and cleaning/packing after, will request 5 days early for travel.   - discussed broadly that she is now in \"survivorship\" with planned surveillance every 6 months after curative-intent.  If we got to a stable point in her care/neuropathy, would recommend transitioning care back to her PCP.  Will continue to follow right now as we're titrating medications.      RTC in 2 months with Dr. Cantrell-King Precious Scott MD  Palliative Medicine  Pager [phone]     40 minutes spent including reviewing record, review of above studies, above visit with patient, and documentation.     (This note was transcribed using voice recognition software. While I review and edit the transcription, I may miss errors, and the software sometimes does unexpected capitalizations and formatting that I miss. Please let me know of any serious " mistranscriptions and I will addend this note.)

## 2021-08-10 ENCOUNTER — VIRTUAL VISIT (OUTPATIENT)
Dept: PALLIATIVE MEDICINE | Facility: CLINIC | Age: 56
End: 2021-08-10
Payer: MEDICARE

## 2021-08-10 DIAGNOSIS — G89.3 CHRONIC PAIN DUE TO NEOPLASM: ICD-10-CM

## 2021-08-10 DIAGNOSIS — M79.2 NEUROPATHIC PAIN: Primary | ICD-10-CM

## 2021-08-10 PROCEDURE — 99215 OFFICE O/P EST HI 40 MIN: CPT | Mod: 95 | Performed by: INTERNAL MEDICINE

## 2021-08-10 RX ORDER — DULOXETIN HYDROCHLORIDE 30 MG/1
30 CAPSULE, DELAYED RELEASE ORAL DAILY
Qty: 30 CAPSULE | Refills: 3 | Status: SHIPPED | OUTPATIENT
Start: 2021-08-10 | End: 2021-10-28

## 2021-08-10 RX ORDER — OXYCODONE HYDROCHLORIDE 5 MG/1
5 TABLET ORAL 3 TIMES DAILY PRN
Qty: 90 TABLET | Refills: 0 | Status: SHIPPED | OUTPATIENT
Start: 2021-08-10 | End: 2021-09-09

## 2021-08-10 NOTE — PATIENT INSTRUCTIONS
Thank you for seeing the Palliative Care Clinic today.      1. Stop citalopram and start duloxetine the next day.     Return to clinic in 2 months for a follow-up.      You can reach the Palliative Care Team during business hours at the following numbers:   -For the [facility], call [phone]  -To reach the palliative RN for questions or refills, call [phone]       To reach the Palliative Care Provider on-call After-hours or on holidays and weekends, call: [phone].  Please note that we are not able to provide pain medication refills on evenings or weekends.

## 2021-08-16 NOTE — TELEPHONE ENCOUNTER
Palliative care asked that I reach out to patient regarding questions prior to upcoming clinic and procedure. Pt will have clinic 2/4 and procedure on 2/8, encouarged her to ask questions specific to stent choice (metal vs plastic) during her visit with Dr. Aviles     Pt understands plan.    ML  
+deformities: b/l club feet, bowed legs. left arm contracture.
yes

## 2021-08-27 ENCOUNTER — LAB (OUTPATIENT)
Dept: LAB | Facility: CLINIC | Age: 56
End: 2021-08-27
Attending: INTERNAL MEDICINE
Payer: MEDICARE

## 2021-08-27 PROCEDURE — 96523 IRRIG DRUG DELIVERY DEVICE: CPT

## 2021-08-27 PROCEDURE — 250N000011 HC RX IP 250 OP 636: Performed by: INTERNAL MEDICINE

## 2021-08-27 RX ORDER — HEPARIN SODIUM (PORCINE) LOCK FLUSH IV SOLN 100 UNIT/ML 100 UNIT/ML
5 SOLUTION INTRAVENOUS ONCE
Status: COMPLETED | OUTPATIENT
Start: 2021-08-27 | End: 2021-08-27

## 2021-08-27 RX ADMIN — Medication 5 ML: at 13:07

## 2021-08-27 NOTE — NURSING NOTE
Chief Complaint   Patient presents with     Port Flush     Port flushed by rn in lab.     Port accessed with 20g 3/4 inch gripper needle by RN,  line flushed with saline and heparin.      Rinku Flores RN

## 2021-09-13 ENCOUNTER — IMMUNIZATION (OUTPATIENT)
Dept: NURSING | Facility: CLINIC | Age: 56
End: 2021-09-13
Payer: MEDICARE

## 2021-09-13 PROCEDURE — 91301 PR COVID VAC MODERNA 100 MCG/0.5 ML IM: CPT

## 2021-09-13 PROCEDURE — G0008 ADMIN INFLUENZA VIRUS VAC: HCPCS

## 2021-09-13 PROCEDURE — 90682 RIV4 VACC RECOMBINANT DNA IM: CPT

## 2021-09-13 PROCEDURE — 0013A PR ADMIN COVID VAC MODERNA, 3RD DOSE IMM COMP PT: CPT

## 2021-09-26 DIAGNOSIS — D49.0 IPMN (INTRADUCTAL PAPILLARY MUCINOUS NEOPLASM): ICD-10-CM

## 2021-09-26 DIAGNOSIS — C16.3 MALIGNANT NEOPLASM OF PYLORIC ANTRUM (H): ICD-10-CM

## 2021-09-26 DIAGNOSIS — R10.13 ABDOMINAL PAIN, EPIGASTRIC: ICD-10-CM

## 2021-09-28 RX ORDER — PANTOPRAZOLE SODIUM 40 MG/1
40 TABLET, DELAYED RELEASE ORAL DAILY
Qty: 30 TABLET | Refills: 1 | Status: SHIPPED | OUTPATIENT
Start: 2021-09-28 | End: 2021-10-22

## 2021-10-06 ENCOUNTER — MYC REFILL (OUTPATIENT)
Dept: PALLIATIVE MEDICINE | Facility: CLINIC | Age: 56
End: 2021-10-06

## 2021-10-06 DIAGNOSIS — G89.3 CHRONIC PAIN DUE TO NEOPLASM: ICD-10-CM

## 2021-10-06 DIAGNOSIS — M79.2 NEUROPATHIC PAIN: ICD-10-CM

## 2021-10-06 RX ORDER — OXYCODONE HYDROCHLORIDE 5 MG/1
5 TABLET ORAL 3 TIMES DAILY PRN
Qty: 90 TABLET | Refills: 0 | Status: SHIPPED | OUTPATIENT
Start: 2021-10-06 | End: 2021-11-03

## 2021-10-06 NOTE — TELEPHONE ENCOUNTER
Received Vivak[employer] message from patient requesting refill of oxycodone.     Last refill: 9/9/21  Last office visit: 8/10/21  Scheduled for follow up 10/28/21     Will route request to MD for review.     Reviewed MN  Report.

## 2021-10-21 DIAGNOSIS — R10.13 ABDOMINAL PAIN, EPIGASTRIC: ICD-10-CM

## 2021-10-21 DIAGNOSIS — C16.3 MALIGNANT NEOPLASM OF PYLORIC ANTRUM (H): ICD-10-CM

## 2021-10-21 DIAGNOSIS — D49.0 IPMN (INTRADUCTAL PAPILLARY MUCINOUS NEOPLASM): ICD-10-CM

## 2021-10-21 NOTE — TELEPHONE ENCOUNTER
"Requested Prescriptions   Pending Prescriptions Disp Refills     pantoprazole (PROTONIX) 40 MG EC tablet [Pharmacy Med Name: PANTOPRAZOLE SOD DR 40 MG TAB] 30 tablet 1     Sig: TAKE 1 TABLET BY MOUTH EVERY DAY       PPI Protocol Failed - 10/21/2021  4:52 PM        Failed - No positive pregnancy test in past 12 months        Passed - Not on Clopidogrel (unless Pantoprazole ordered)        Passed - No diagnosis of osteoporosis on record        Passed - Recent (12 mo) or future (30 days) visit within the authorizing provider's specialty     Patient has had an office visit with the authorizing provider or a provider within the authorizing providers department within the previous 12 mos or has a future within next 30 days. See \"Patient Info\" tab in inbasket, or \"Choose Columns\" in Meds & Orders section of the refill encounter.              Passed - Medication is active on med list        Passed - Patient is age 18 or older        Passed - No active pregnacy on record           Unable to find pregnancy test.  To MD to advise; failed protocol.  Joann Strickland RN    "

## 2021-10-22 ENCOUNTER — LAB (OUTPATIENT)
Dept: LAB | Facility: CLINIC | Age: 56
End: 2021-10-22
Attending: INTERNAL MEDICINE
Payer: MEDICARE

## 2021-10-22 PROCEDURE — 96523 IRRIG DRUG DELIVERY DEVICE: CPT

## 2021-10-22 PROCEDURE — 250N000011 HC RX IP 250 OP 636: Performed by: INTERNAL MEDICINE

## 2021-10-22 RX ORDER — PANTOPRAZOLE SODIUM 40 MG/1
TABLET, DELAYED RELEASE ORAL
Qty: 30 TABLET | Refills: 1 | Status: SHIPPED | OUTPATIENT
Start: 2021-10-22 | End: 2021-11-16

## 2021-10-22 RX ORDER — HEPARIN SODIUM (PORCINE) LOCK FLUSH IV SOLN 100 UNIT/ML 100 UNIT/ML
5 SOLUTION INTRAVENOUS
Status: COMPLETED | OUTPATIENT
Start: 2021-10-22 | End: 2021-10-22

## 2021-10-22 RX ADMIN — Medication 5 ML: at 12:54

## 2021-10-22 NOTE — NURSING NOTE
Chief Complaint   Patient presents with     Lab Only     port flushed by rn.       Port accessed with 20 gauge gripper needle and labs drawn by rn.  Port flushed with NS and heparin.  Pt tolerated well.  VS taken.  Pt checked in for next appt.    Jo Disla RN

## 2021-10-28 ENCOUNTER — VIRTUAL VISIT (OUTPATIENT)
Dept: ONCOLOGY | Facility: CLINIC | Age: 56
End: 2021-10-28
Attending: STUDENT IN AN ORGANIZED HEALTH CARE EDUCATION/TRAINING PROGRAM
Payer: MEDICARE

## 2021-10-28 DIAGNOSIS — C16.8 MALIGNANT NEOPLASM OF OVERLAPPING SITES OF STOMACH (H): ICD-10-CM

## 2021-10-28 DIAGNOSIS — Z79.891 ENCOUNTER FOR LONG-TERM USE OF OPIATE ANALGESIC: ICD-10-CM

## 2021-10-28 DIAGNOSIS — Z51.5 ENCOUNTER FOR PALLIATIVE CARE: ICD-10-CM

## 2021-10-28 DIAGNOSIS — M79.2 NEUROPATHIC PAIN: Primary | ICD-10-CM

## 2021-10-28 DIAGNOSIS — G89.3 CHRONIC PAIN DUE TO NEOPLASM: ICD-10-CM

## 2021-10-28 DIAGNOSIS — M19.90 ARTHRITIS PAIN: ICD-10-CM

## 2021-10-28 PROCEDURE — 99214 OFFICE O/P EST MOD 30 MIN: CPT | Mod: 95 | Performed by: STUDENT IN AN ORGANIZED HEALTH CARE EDUCATION/TRAINING PROGRAM

## 2021-10-28 PROCEDURE — G0463 HOSPITAL OUTPT CLINIC VISIT: HCPCS | Mod: PN,RTG | Performed by: STUDENT IN AN ORGANIZED HEALTH CARE EDUCATION/TRAINING PROGRAM

## 2021-10-28 RX ORDER — DULOXETIN HYDROCHLORIDE 30 MG/1
60 CAPSULE, DELAYED RELEASE ORAL DAILY
Qty: 60 CAPSULE | Refills: 2 | Status: SHIPPED | OUTPATIENT
Start: 2021-10-28 | End: 2021-12-01

## 2021-10-28 NOTE — LETTER
10/28/2021         RE: Nathalie Bashir  [address] [address]        Dear Colleague,    Thank you for referring your patient, Nathalie Bashir, to the [facility] [place]. Please see a copy of my visit note below.    Nathalie Bashir is a 56 year old who is being evaluated via a billable video visit.  Patient reviewed medications and allergies via [employer] e-check in.       How would you like to obtain your AVS? [facility]  If the video visit is dropped, the invitation should be resent by: Text to cell phone: [phone]  Will anyone else be joining your video visit? No      Palliative Care Progress Note    Patient Name: Natahlie Bashir  Primary Provider: Marianne Gonzalez    Chief Complaint/Patient ID: 55 yo F with gastric adenocarcinoma              - Found during Whipple 1/2020 for presumed IPMN - no pancreatic malignancy found, but had poorly differentiated adenoca.  Received neoadjuvant chemo (5FU, leucovorin, oxaliplatin, docetaxel) with curative-intent subtotal gastrectomy, David-en-Y gastrojejunostomy 10/2020.  Required stenting of the jejunum for stenosis causing pain, removed 2/8/21.     Chronic and chemo-induced neuropathy - intol of gabapentin, pregabalin, duloxetine not effective, on TID oxycodone    Last Palliative care appointment: 8/10/21 with Dr. Scott. Started Cymbalta.     Reviewed: Yes, no concerns.    Interim History:  Nathalie Bashir 56 year old female is seen today for follow up with Palliative Care via billable video visit.      Continues to have difficulty with the neuropathy. Feels like she's walking on balls, affects her balance. Does think that the Cymbalta has helped some- now doesn't have symptoms creeping up to her knees.    Also thinks that chemo made her arthritic pain worse, and this has been amplified with weather change.    Doing well with 5mg oxycodone TID.    Using Elavil for sleep. Dose was decreased from 100mg to 75mg not long ago due to  increased somnolence. Has also previously taken trazodone from sleep. NO other hx of sleep aids.     Social History:  Lives with her , son, and 3 grandchildren  Retired, worked in special education  Social History     Tobacco Use     Smoking status: Former Smoker     Packs/day: 0.50     Years: 25.00     Pack years: 12.50     Types: Cigarettes     Quit date: 2020     Years since quittin.8     Smokeless tobacco: Never Used   Substance Use Topics     Alcohol use: No     Drug use: No       Family History- Reviewed in Epic.    Allergies   Allergen Reactions     Gluten Meal Palpitations     Augmentin Rash     Patient tolerated Zosyn in 2020     Oxycontin [Oxycodone]      Confusion, loopy, oxycodone is tolerated     Pregabalin      interfered with Cymbalta     Topiramate      Tongue numbness, taste alteration, irritable      Acetaminophen Nausea     Urine retention       Dust Mite Extract      Gabapentin Dizziness and GI Disturbance     Ketorolac Headache     Latex Rash     Methadone Fatigue     Mold      Naprosyn [Naproxen] Dizziness and GI Disturbance     Seasonal Allergies        Advanced Care Planning: Has not completed.    Medications- Reviewed in Epic.    Past Medical History- Reviewed in [facility].    Past Surgical History- Reviewed in Epic.    Review of Systems:   ROS: 10 point ROS neg other than the symptoms noted above in the HPI.    Physical Exam:   Constitutional: Alert, pleasant, no apparent distress. Sitting up in chair.  Eyes: Sclera non-icteric, no eye discharge.  ENT: No nasal discharge. Ears grossly normal.  Respiratory: Unlabored respirations. Speaking in full sentences.  Musculoskeletal: Extremities appear normal- no gross deformities noted. No edema noted on upper body.   Skin: No suspicious lesions or rashes on visible skin.  Neurologic: Clear speech, no aphasia. No facial droop.  Psychiatric: Mentation appears normal, appropriate attention. Affect normal/bright. Does not appear anxious or  "depressed.      Key Data Reviewed:  LABS: 5/21/21- Cr 0.93, GFR 70, Albumin 3.4, LFTs wnl. WBC 4.8, Hgb 12.2, Plts 197.     Impression & Recommendations & Counseling:  Nathalie Bashir is a 56 year old female with history of gastric adenocarcinoma found during Whipple 1/2020 for presumed IPMN, no pancreatic malignancy found at that time, now s/p neoadjuvant chemotherapy and curative intent surgery 10/2020.  Is now on surveillance and feeling more back to normal.  Seen for symptom management of peripheral neuropathy that worsened in the setting of chemo.     -Increase Cymbalta to 60mg daily. Depending on effect, could consider going up as high as 90mg daily.  -Continue oxycodone 5 mg TID.  -Recommended trial of topical anti-inflammatory such as aspercream for hand and possibly feet.  -She also takes Elavil for sleep. If she notices increased hangover effect, it could be the Cymbalta prolonging metabolism of Elavil and the Elavil may need to be decreased. If that is not sufficient, may need to look into different sleep aid.  - Discussed broadly that she is now in \"survivorship\" with planned surveillance every 6 months after curative-intent.  If we got to a stable point in her care/neuropathy, would recommend transitioning care back to her PCP.  Will continue to follow right now as we're titrating medications.      Follow up: 2 months    Video-Visit Details  Video Start Time: 3:57PM  Video End Time: 4:15PM    Originating Location (pt. Location): Home     Distant Location (provider location): [facility]      Platform used for Video Visit: [employer]     Total time spent on day of encounter is 33 mins, including reviewing record, review of above studies, above visit with patient, and documentation.     Karla Millard DO  Palliative Medicine   Pager [phone], AMCOM ID [id number]    Some chart documentation performed using Dragon Voice recognition Software. Although reviewed after completion, " some words and grammatical errors may remain.        Again, thank you for allowing me to participate in the care of your patient.        Sincerely,        Karla Millard, DO

## 2021-10-28 NOTE — PROGRESS NOTES
Nathalie Bashir is a 56 year old who is being evaluated via a billable video visit.  Patient reviewed medications and allergies via [employer] e-check in.       How would you like to obtain your AVS? [employer]  If the video visit is dropped, the invitation should be resent by: Text to cell phone: [phone]  Will anyone else be joining your video visit? No      Palliative Care Progress Note    Patient Name: Nathalie Bashir  Primary Provider: Marianne Gonzalez    Chief Complaint/Patient ID: 55 yo F with gastric adenocarcinoma              - Found during Whipple 1/2020 for presumed IPMN - no pancreatic malignancy found, but had poorly differentiated adenoca.  Received neoadjuvant chemo (5FU, leucovorin, oxaliplatin, docetaxel) with curative-intent subtotal gastrectomy, David-en-Y gastrojejunostomy 10/2020.  Required stenting of the jejunum for stenosis causing pain, removed 2/8/21.     Chronic and chemo-induced neuropathy - intol of gabapentin, pregabalin, duloxetine not effective, on TID oxycodone    Last Palliative care appointment: 8/10/21 with Dr. Scott. Started Cymbalta.     Reviewed: Yes, no concerns.    Interim History:  Nathalie Bashir 56 year old female is seen today for follow up with Palliative Care via billable video visit.      Continues to have difficulty with the neuropathy. Feels like she's walking on balls, affects her balance. Does think that the Cymbalta has helped some- now doesn't have symptoms creeping up to her knees.    Also thinks that chemo made her arthritic pain worse, and this has been amplified with weather change.    Doing well with 5mg oxycodone TID.    Using Elavil for sleep. Dose was decreased from 100mg to 75mg not long ago due to increased somnolence. Has also previously taken trazodone from sleep. NO other hx of sleep aids.     Social History:  Lives with her , son, and 3 grandchildren  Retired, worked in special education  Social History     Tobacco Use     Smoking status:  Former Smoker     Packs/day: 0.50     Years: 25.00     Pack years: 12.50     Types: Cigarettes     Quit date: 2020     Years since quittin.8     Smokeless tobacco: Never Used   Substance Use Topics     Alcohol use: No     Drug use: No       Family History- Reviewed in Epic.    Allergies   Allergen Reactions     Gluten Meal Palpitations     Augmentin Rash     Patient tolerated Zosyn in 2020     Oxycontin [Oxycodone]      Confusion, loopy, oxycodone is tolerated     Pregabalin      interfered with Cymbalta     Topiramate      Tongue numbness, taste alteration, irritable      Acetaminophen Nausea     Urine retention       Dust Mite Extract      Gabapentin Dizziness and GI Disturbance     Ketorolac Headache     Latex Rash     Methadone Fatigue     Mold      Naprosyn [Naproxen] Dizziness and GI Disturbance     Seasonal Allergies        Advanced Care Planning: Has not completed.    Medications- Reviewed in Epic.    Past Medical History- Reviewed in [facility].    Past Surgical History- Reviewed in Epic.    Review of Systems:   ROS: 10 point ROS neg other than the symptoms noted above in the HPI.    Physical Exam:   Constitutional: Alert, pleasant, no apparent distress. Sitting up in chair.  Eyes: Sclera non-icteric, no eye discharge.  ENT: No nasal discharge. Ears grossly normal.  Respiratory: Unlabored respirations. Speaking in full sentences.  Musculoskeletal: Extremities appear normal- no gross deformities noted. No edema noted on upper body.   Skin: No suspicious lesions or rashes on visible skin.  Neurologic: Clear speech, no aphasia. No facial droop.  Psychiatric: Mentation appears normal, appropriate attention. Affect normal/bright. Does not appear anxious or depressed.      Key Data Reviewed:  LABS: 21- Cr 0.93, GFR 70, Albumin 3.4, LFTs wnl. WBC 4.8, Hgb 12.2, Plts 197.     Impression & Recommendations & Counseling:  Nathalie Bashir is a 56 year old female with history of gastric adenocarcinoma found  "during [facility]ipple 1/2020 for presumed IPMN, no pancreatic malignancy found at that time, now s/p neoadjuvant chemotherapy and curative intent surgery 10/2020.  Is now on surveillance and feeling more back to normal.  Seen for symptom management of peripheral neuropathy that worsened in the setting of chemo.     -Increase Cymbalta to 60mg daily. Depending on effect, could consider going up as high as 90mg daily.  -Continue oxycodone 5 mg TID.  -Recommended trial of topical anti-inflammatory such as aspercream for hand and possibly feet.  -She also takes Elavil for sleep. If she notices increased hangover effect, it could be the Cymbalta prolonging metabolism of Elavil and the Elavil may need to be decreased. If that is not sufficient, may need to look into different sleep aid.  - Discussed broadly that she is now in \"survivorship\" with planned surveillance every 6 months after curative-intent.  If we got to a stable point in her care/neuropathy, would recommend transitioning care back to her PCP.  Will continue to follow right now as we're titrating medications.      Follow up: 2 months    Video-Visit Details  Video Start Time: 3:57PM  Video End Time: 4:15PM    Originating Location (pt. Location): Home     Distant Location (provider location): [facility]      Platform used for Video Visit: [employer]     Total time spent on day of encounter is 33 mins, including reviewing record, review of above studies, above visit with patient, and documentation.     Karla Millard DO  Palliative Medicine   Pager [phone], AMCOM ID [id number]    Some chart documentation performed using Dragon Voice recognition Software. Although reviewed after completion, some words and grammatical errors may remain.    "

## 2021-10-28 NOTE — LETTER
10/28/2021         RE: Nathalie Bashir  [address] [address]        Dear Colleague,    Thank you for referring your patient, Nathalie Bashir, to the [facility] [place]. Please see a copy of my visit note below.    Nathalie Bashir is a 56 year old who is being evaluated via a billable video visit.  Patient reviewed medications and allergies via [employer] e-check in.       How would you like to obtain your AVS? [employer]  If the video visit is dropped, the invitation should be resent by: Text to cell phone: [phone]  Will anyone else be joining your video visit? No      Palliative Care Progress Note    Patient Name: Nathalie Bashir  Primary Provider: Marianne Gonzalez    Chief Complaint/Patient ID: 57 yo F with gastric adenocarcinoma              - Found during Whipple 1/2020 for presumed IPMN - no pancreatic malignancy found, but had poorly differentiated adenoca.  Received neoadjuvant chemo (5FU, leucovorin, oxaliplatin, docetaxel) with curative-intent subtotal gastrectomy, David-en-Y gastrojejunostomy 10/2020.  Required stenting of the jejunum for stenosis causing pain, removed 2/8/21.     Chronic and chemo-induced neuropathy - intol of gabapentin, pregabalin, duloxetine not effective, on TID oxycodone    Last Palliative care appointment: 8/10/21 with Dr. Scott. Started Cymbalta.     Reviewed: Yes, no concerns.    Interim History:  Nathalie Bashir 56 year old female is seen today for follow up with Palliative Care via billable video visit.      Continues to have difficulty with the neuropathy. Feels like she's walking on balls, affects her balance. Does think that the Cymbalta has helped some- now doesn't have symptoms creeping up to her knees.    Also thinks that chemo made her arthritic pain worse, and this has been amplified with weather change.    Doing well with 5mg oxycodone TID.    Using Elavil for sleep. Dose was decreased from 100mg to 75mg not long ago due to  increased somnolence. Has also previously taken trazodone from sleep. NO other hx of sleep aids.     Social History:  Lives with her , son, and 3 grandchildren  Retired, worked in special education  Social History     Tobacco Use     Smoking status: Former Smoker     Packs/day: 0.50     Years: 25.00     Pack years: 12.50     Types: Cigarettes     Quit date: 2020     Years since quittin.8     Smokeless tobacco: Never Used   Substance Use Topics     Alcohol use: No     Drug use: No       Family History- Reviewed in Epic.    Allergies   Allergen Reactions     Gluten Meal Palpitations     Augmentin Rash     Patient tolerated Zosyn in 2020     Oxycontin [Oxycodone]      Confusion, loopy, oxycodone is tolerated     Pregabalin      interfered with Cymbalta     Topiramate      Tongue numbness, taste alteration, irritable      Acetaminophen Nausea     Urine retention       Dust Mite Extract      Gabapentin Dizziness and GI Disturbance     Ketorolac Headache     Latex Rash     Methadone Fatigue     Mold      Naprosyn [Naproxen] Dizziness and GI Disturbance     Seasonal Allergies        Advanced Care Planning: Has not completed.    Medications- Reviewed in Epic.    Past Medical History- Reviewed in [facility].    Past Surgical History- Reviewed in Epic.    Review of Systems:   ROS: 10 point ROS neg other than the symptoms noted above in the HPI.    Physical Exam:   Constitutional: Alert, pleasant, no apparent distress. Sitting up in chair.  Eyes: Sclera non-icteric, no eye discharge.  ENT: No nasal discharge. Ears grossly normal.  Respiratory: Unlabored respirations. Speaking in full sentences.  Musculoskeletal: Extremities appear normal- no gross deformities noted. No edema noted on upper body.   Skin: No suspicious lesions or rashes on visible skin.  Neurologic: Clear speech, no aphasia. No facial droop.  Psychiatric: Mentation appears normal, appropriate attention. Affect normal/bright. Does not appear anxious or  "depressed.      Key Data Reviewed:  LABS: 5/21/21- Cr 0.93, GFR 70, Albumin 3.4, LFTs wnl. WBC 4.8, Hgb 12.2, Plts 197.     Impression & Recommendations & Counseling:  Nathalie Bashir is a 56 year old female with history of gastric adenocarcinoma found during Whipple 1/2020 for presumed IPMN, no pancreatic malignancy found at that time, now s/p neoadjuvant chemotherapy and curative intent surgery 10/2020.  Is now on surveillance and feeling more back to normal.  Seen for symptom management of peripheral neuropathy that worsened in the setting of chemo.     -Increase Cymbalta to 60mg daily. Depending on effect, could consider going up as high as 90mg daily.  -Continue oxycodone 5 mg TID.  -Recommended trial of topical anti-inflammatory such as aspercream for hand and possibly feet.  -She also takes Elavil for sleep. If she notices increased hangover effect, it could be the Cymbalta prolonging metabolism of Elavil and the Elavil may need to be decreased. If that is not sufficient, may need to look into different sleep aid.  - Discussed broadly that she is now in \"survivorship\" with planned surveillance every 6 months after curative-intent.  If we got to a stable point in her care/neuropathy, would recommend transitioning care back to her PCP.  Will continue to follow right now as we're titrating medications.      Follow up: 2 months    Video-Visit Details  Video Start Time: 3:57PM  Video End Time: 4:15PM    Originating Location (pt. Location): Home     Distant Location (provider location): [facility]      Platform used for Video Visit: [employer]     Total time spent on day of encounter is 33 mins, including reviewing record, review of above studies, above visit with patient, and documentation.     Karla Millard DO  Palliative Medicine   Pager [phone], AMCOM ID [id number]    Some chart documentation performed using Dragon Voice recognition Software. Although reviewed after completion, " some words and grammatical errors may remain.        Again, thank you for allowing me to participate in the care of your patient.        Sincerely,        Karla Millard, DO

## 2021-10-29 NOTE — PATIENT INSTRUCTIONS
Recommendations:  -Increase Cymbalta to 2 capsules daily. This can either be as a single dose of 60mg or 1 capsule twice daily.  -If you notice increased somnolence in the morning again, it may be that the Elavil dose needs to be decreased due to the Cymbalta.  -Also suggest trying topical anti-inflammatory, such as regular Aspercream, for arthritic pain. It may also be helpful on your feet.  -Continue oxycodone as you have been.    Follow up: About 2 months but please call sooner with questions or concerns.      Reasons to Call    If you are having worsening/uncontrolled symptoms we want you to call!    You or your other physicians make any changes to medications we have prescribed.  -Please call for refills 4-5 days before you will run out of medication.    Important Phone Numbers    [facility] - Astrid Santiago. Palliative Care RN - [phone]    [facility] [place]. Palliative Care RN - [phone]  *For Refills, scheduling, and general questions - [phone]  *After hours or on weekends. Will connect you with on call MD. [phone]

## 2021-11-03 ENCOUNTER — MYC REFILL (OUTPATIENT)
Dept: PALLIATIVE MEDICINE | Facility: CLINIC | Age: 56
End: 2021-11-03

## 2021-11-03 DIAGNOSIS — G89.3 CHRONIC PAIN DUE TO NEOPLASM: ICD-10-CM

## 2021-11-03 DIAGNOSIS — M79.2 NEUROPATHIC PAIN: ICD-10-CM

## 2021-11-03 RX ORDER — OXYCODONE HYDROCHLORIDE 5 MG/1
5 TABLET ORAL 3 TIMES DAILY PRN
Qty: 90 TABLET | Refills: 0 | Status: SHIPPED | OUTPATIENT
Start: 2021-11-03 | End: 2021-12-01

## 2021-11-03 NOTE — TELEPHONE ENCOUNTER
Received mychart from patient requesting refill of oxycodone.     Last refill: 10/6/2021  Last office visit: 10/28/2021  Scheduled for follow up 1/13/2021    Will route request to MD for review.     Reviewed [place]  Report.

## 2021-11-14 DIAGNOSIS — D49.0 IPMN (INTRADUCTAL PAPILLARY MUCINOUS NEOPLASM): ICD-10-CM

## 2021-11-14 DIAGNOSIS — R10.13 ABDOMINAL PAIN, EPIGASTRIC: ICD-10-CM

## 2021-11-14 DIAGNOSIS — C16.3 MALIGNANT NEOPLASM OF PYLORIC ANTRUM (H): ICD-10-CM

## 2021-11-15 ENCOUNTER — ANCILLARY PROCEDURE (OUTPATIENT)
Dept: CT IMAGING | Facility: CLINIC | Age: 56
End: 2021-11-15
Attending: INTERNAL MEDICINE
Payer: MEDICARE

## 2021-11-15 ENCOUNTER — LAB (OUTPATIENT)
Dept: LAB | Facility: CLINIC | Age: 56
End: 2021-11-15
Payer: MEDICARE

## 2021-11-15 DIAGNOSIS — C16.9 GASTRIC ADENOCARCINOMA (H): ICD-10-CM

## 2021-11-15 DIAGNOSIS — D50.9 IRON DEFICIENCY ANEMIA, UNSPECIFIED IRON DEFICIENCY ANEMIA TYPE: ICD-10-CM

## 2021-11-15 LAB
ALBUMIN SERPL-MCNC: 3.3 G/DL (ref 3.4–5)
ALP SERPL-CCNC: 164 U/L (ref 40–150)
ALT SERPL W P-5'-P-CCNC: 29 U/L (ref 0–50)
ANION GAP SERPL CALCULATED.3IONS-SCNC: 6 MMOL/L (ref 3–14)
AST SERPL W P-5'-P-CCNC: 21 U/L (ref 0–45)
BASOPHILS # BLD AUTO: 0 10E3/UL (ref 0–0.2)
BASOPHILS NFR BLD AUTO: 1 %
BILIRUB SERPL-MCNC: 0.4 MG/DL (ref 0.2–1.3)
BUN SERPL-MCNC: 9 MG/DL (ref 7–30)
CALCIUM SERPL-MCNC: 8.7 MG/DL (ref 8.5–10.1)
CHLORIDE BLD-SCNC: 107 MMOL/L (ref 94–109)
CO2 SERPL-SCNC: 30 MMOL/L (ref 20–32)
CREAT SERPL-MCNC: 0.91 MG/DL (ref 0.52–1.04)
EOSINOPHIL # BLD AUTO: 0.1 10E3/UL (ref 0–0.7)
EOSINOPHIL NFR BLD AUTO: 2 %
ERYTHROCYTE [DISTWIDTH] IN BLOOD BY AUTOMATED COUNT: 13 % (ref 10–15)
FERRITIN SERPL-MCNC: 146 NG/ML (ref 8–252)
GFR SERPL CREATININE-BSD FRML MDRD: 71 ML/MIN/1.73M2
GLUCOSE BLD-MCNC: 92 MG/DL (ref 70–99)
HCT VFR BLD AUTO: 39.5 % (ref 35–47)
HGB BLD-MCNC: 12.8 G/DL (ref 11.7–15.7)
IMM GRANULOCYTES # BLD: 0 10E3/UL
IMM GRANULOCYTES NFR BLD: 0 %
IRON SATN MFR SERPL: 37 % (ref 15–46)
IRON SERPL-MCNC: 100 UG/DL (ref 35–180)
LYMPHOCYTES # BLD AUTO: 1.8 10E3/UL (ref 0.8–5.3)
LYMPHOCYTES NFR BLD AUTO: 32 %
MCH RBC QN AUTO: 31.4 PG (ref 26.5–33)
MCHC RBC AUTO-ENTMCNC: 32.4 G/DL (ref 31.5–36.5)
MCV RBC AUTO: 97 FL (ref 78–100)
MONOCYTES # BLD AUTO: 0.5 10E3/UL (ref 0–1.3)
MONOCYTES NFR BLD AUTO: 8 %
NEUTROPHILS # BLD AUTO: 3.1 10E3/UL (ref 1.6–8.3)
NEUTROPHILS NFR BLD AUTO: 57 %
NRBC # BLD AUTO: 0 10E3/UL
NRBC BLD AUTO-RTO: 0 /100
PLATELET # BLD AUTO: 197 10E3/UL (ref 150–450)
POTASSIUM BLD-SCNC: 4.3 MMOL/L (ref 3.4–5.3)
PROT SERPL-MCNC: 6.7 G/DL (ref 6.8–8.8)
RBC # BLD AUTO: 4.07 10E6/UL (ref 3.8–5.2)
SODIUM SERPL-SCNC: 143 MMOL/L (ref 133–144)
TIBC SERPL-MCNC: 273 UG/DL (ref 240–430)
WBC # BLD AUTO: 5.5 10E3/UL (ref 4–11)

## 2021-11-15 PROCEDURE — 36415 COLL VENOUS BLD VENIPUNCTURE: CPT | Performed by: PATHOLOGY

## 2021-11-15 PROCEDURE — 85025 COMPLETE CBC W/AUTO DIFF WBC: CPT | Performed by: PATHOLOGY

## 2021-11-15 PROCEDURE — 83550 IRON BINDING TEST: CPT | Performed by: PATHOLOGY

## 2021-11-15 PROCEDURE — 74177 CT ABD & PELVIS W/CONTRAST: CPT | Mod: GC | Performed by: STUDENT IN AN ORGANIZED HEALTH CARE EDUCATION/TRAINING PROGRAM

## 2021-11-15 PROCEDURE — 71260 CT THORAX DX C+: CPT | Mod: GC | Performed by: STUDENT IN AN ORGANIZED HEALTH CARE EDUCATION/TRAINING PROGRAM

## 2021-11-15 PROCEDURE — 80053 COMPREHEN METABOLIC PANEL: CPT | Performed by: PATHOLOGY

## 2021-11-15 PROCEDURE — 82728 ASSAY OF FERRITIN: CPT | Performed by: PATHOLOGY

## 2021-11-15 RX ORDER — IOPAMIDOL 755 MG/ML
96 INJECTION, SOLUTION INTRAVASCULAR ONCE
Status: COMPLETED | OUTPATIENT
Start: 2021-11-15 | End: 2021-11-15

## 2021-11-15 RX ADMIN — HEPARIN SODIUM (PORCINE) LOCK FLUSH IV SOLN 100 UNIT/ML 500 UNITS: 100 SOLUTION at 11:14

## 2021-11-15 RX ADMIN — IOPAMIDOL 96 ML: 755 INJECTION, SOLUTION INTRAVASCULAR at 11:08

## 2021-11-16 RX ORDER — PANTOPRAZOLE SODIUM 40 MG/1
TABLET, DELAYED RELEASE ORAL
Qty: 30 TABLET | Refills: 1 | Status: SHIPPED | OUTPATIENT
Start: 2021-11-16 | End: 2021-12-16

## 2021-11-18 ENCOUNTER — VIRTUAL VISIT (OUTPATIENT)
Dept: ONCOLOGY | Facility: CLINIC | Age: 56
End: 2021-11-18
Attending: INTERNAL MEDICINE
Payer: MEDICARE

## 2021-11-18 DIAGNOSIS — C16.8 MALIGNANT NEOPLASM OF OVERLAPPING SITES OF STOMACH (H): Primary | ICD-10-CM

## 2021-11-18 DIAGNOSIS — D50.9 IRON DEFICIENCY ANEMIA, UNSPECIFIED IRON DEFICIENCY ANEMIA TYPE: ICD-10-CM

## 2021-11-18 PROCEDURE — G0463 HOSPITAL OUTPT CLINIC VISIT: HCPCS | Mod: PN,RTG | Performed by: INTERNAL MEDICINE

## 2021-11-18 PROCEDURE — 99215 OFFICE O/P EST HI 40 MIN: CPT | Mod: 95 | Performed by: INTERNAL MEDICINE

## 2021-11-18 NOTE — LETTER
11/18/2021         RE: Nathalie Bashir  [address] [facility] [address]        Dear Colleague,    Thank you for referring your patient, Nathalie Bashir, to the [facility]. Please see a copy of my visit note below.    Oncology/Hematology Visit Note  Nov 18, 2021    Reason for Visit: Follow up of gastric adenocarcinoma.    History of Present Illness:     Please see previous notes for detail. I have copied and updated from prior notes.    Ms. Bashir is a 56 year old female with a history of gastric adenocarcinoma.  She was being followed for a pancreatic duct dilatation and pancreatic cyst which was noted in November 2018.  Since May 2019 she started noticing some nausea and vomiting and occasional abdominal discomfort.  She had an ultrasound in August 2019 which was essentially unremarkable.  She had a repeat endoscopic ultrasound on 10/29/2019 which showed increase in size of the cyst as well as dilation of the main pancreatic duct.  FNA and fluid analysis showed mucinous epithelium.  She was referred to Dr. 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  Surprisingly there was no pancreatic ductal neoplasm seen but on the resected specimen stomach cancer was noted.  It was poorly differentiated adenocarcinoma with poorly cohesive/signet ring cell type with proximal gastric mucosal and serosal margins being positive.  There was lymphovascular and perineural invasion seen.  22 lymph nodes were sampled and all were benign.  It was a pT4aN0 lesion.  HER-2/christine FISH was not amplified.         EUS on 3/3/2020 without clear evidence of neoplasm endoscopically. Left gastric lymph node fine-needle aspiration was negative for carcinoma. Biopsy from the gastric jejunal anastomosis as well as lesser curvature of the stomach also did not show any malignancy.     She had a PET/CT on 3/13/2020 which showed soft tissue streaky density with increased FDG uptake  adjacent to the pancreaticojejunostomy which could be neoplastic in origin.  There is mild increased FDG uptake in the gastric remnant.  Focal area of soft tissue thickening with fatty streakiness along medial laparotomy with increased FDG uptake which likely is inflammatory. Increased FDG uptake in thoracic esophagus which could be esophagitis.      She started on chemotherapy with 5FU, oxaliplatin, and Taxotere on 3/27/20. She was seen on 4/7/20 for evaluation of fevers. She was treated for possible pneumonia with Levaquin.      She was seen in clinic 4/13/20 for RUQ pain,CT and labs reassuring. She received cycles 2 and 3 of chemotherapy on 4/20/20 and 5/4/20. She was admitted from 5/21-5/23/20 with neutropenic aspiration pneumonia. She received cycle 4 on 5/27/20 with Neulasta support. PET/CT on 6/3/20 showed mild residual uptake at the site of gastrojejunal anastomosis which likely is physiologic.  There is almost resolution of soft tissue nodule next to the pancreaticoduodenal anastomosis without FDG uptake.  There is focal increased uptake in the left posterior acetabulum which could be artifact.     Decreased dose of oxaliplatin due to neuropathy/cold sensitivity on 6/11. Decreased 5FU to 85% with cycle #6 due to mucositis/diarrhea/taste changes. Treatment was held 7/9/20 due to diarrhea, dehydration, tachycardia, rash, and weight loss and she got cycle 7 on 7/15. On 7/20 she presented to the ED with RUQ abdominal pain, and was admitted 7/20-7/29 for acute cholangitis     She received C#8 FLOT on 8/14/2020.    PET/CT on 9/2/2020 did not show evidence of disease.  Small peritoneal nodule was a stable.  This was most consistent with fat necrosis.    CT chest abdomen and pelvis on 9/23/2020 also does not show evidence of malignancy recurrence and the peritoneal nodule in the right upper quadrant is stable.  Before definite surgery she had diagnostic laparoscopy which was negative for evidence of malignancy so  on 10/21/2020 she had exploratory laparotomy with extensive lysis of additions, partial omentectomy, resection of Gastrojejunal Anastomosis w/ En Bloc Subtotal Gastrectomy and modified D2 Lymphadenectomy with David-en-Y Gastrojejunostomy Reconstruction.  Final pathology showed Focal residual poorly differentiated adenocarcinoma (0.4cm) with signet-ring morphology status    post-neoadjuvant chemotherapy and all 9 lymph nodes were benign.  All margins were negative.  It was a pT1bN0 lesion.    Before definite surgery she had diagnostic laparoscopy which was negative for evidence of malignancy so on 10/21/2020 she had exploratory laparotomy with extensive lysis of additions, partial omentectomy, resection of Gastrojejunal Anastomosis w/ En Bloc Subtotal Gastrectomy and modified D2 Lymphadenectomy with David-en-Y Gastrojejunostomy Reconstruction.  Final pathology showed Focal residual poorly differentiated adenocarcinoma (0.4cm) with signet-ring morphology status    post-neoadjuvant chemotherapy and all 9 lymph nodes were benign.  All margins were negative.  It was a pfI4iP4 lesion.    She was admitted from 11/4/2020-11/6/2020 for nausea vomiting and left upper quadrant pain and CT scan showed fluid collection in the left upper quadrant, likely hematoma.  Upper GI was negative for leak.  No drainage was recommended by IR.  He was discharged on 7 days of levofloxacin and Flagyl.      11/20/2020.  CT abdomen and pelvis showed postsurgical changes of Whipple and subtotal gastrectomy with David-en-Y reconstruction.  There is resolution of previously demonstrated fluid collection between the stomach and the spleen and decrease in sigmoid thickening.  Small left pleural effusion with atelectasis adjacent to it.    12/7/2020.  EGD showed healthy-appearing and generous pouch (remaining the stomach) with widely patent end to side gastrojejunostomy, though with the David/enteral limb mildly stenostic and tortuous at its origin.  Therefore a covered metal stent was placed from stomach to jejunum across the stenotic region, resulting in straightening of the course.    2/8/2021.  EGD was done and stent was removed.    5/21/2021.  CT chest abdomen and pelvis does not show evidence of recurrence of malignancy.    Interval History:  This is a video visit.  She feels good. Energy is improving. Abdomen feels better. Has some soreness. Eating well. Has occasional nausea. BMs are fine. Weight is stable. No bleeding. Neuropathy is stable in hands/feet. No dyspnea.     ECOG 1    ROS:  Otherwise a comprehensive review of the system was unremarkable.    I reviewed other history in epic as below.      Current Outpatient Medications   Medication Sig Dispense Refill     amitriptyline (ELAVIL) 100 MG tablet Take 1 tablet (100 mg) by mouth At Bedtime 30 tablet 3     amylase-lipase-protease (CREON 24) 96965-29657 units CPEP per EC capsule 2 capsules with meals and 1 capsule with snacks (Patient not taking: Reported on 8/10/2021) 240 capsule 4     calcium carbonate 500 mg, elemental, (OSCAL 500) 1250 (500 Ca) MG TABS tablet Take by mouth 3 times daily       Cyanocobalamin 1000 MCG CAPS Take by mouth daily       cyclobenzaprine (FLEXERIL) 10 MG tablet Take 10 mg by mouth 3 times daily as needed for muscle spasms       DULoxetine (CYMBALTA) 30 MG capsule Take 2 capsules (60 mg) by mouth daily 60 capsule 2     famotidine (PEPCID) 20 MG tablet Take 20 mg by mouth 2 times daily as needed        fluticasone (FLONASE) 50 MCG/ACT nasal spray Spray 1 spray into both nostrils daily       hydrOXYzine (VISTARIL) 25 MG capsule Take 25-50 mg by mouth 4 times daily as needed for itching       ibuprofen (ADVIL/MOTRIN) 600 MG tablet Take 1 tablet (600 mg) by mouth every 6 hours as needed for moderate pain 50 tablet 0     lidocaine (XYLOCAINE) 2 % solution Swish and swallow 15 mLs in mouth every 3 hours as needed for moderate pain ; Max 8 doses/24 hour period. 100 mL 0      loperamide (IMODIUM) 2 MG capsule Take 4 mg by mouth 4 times daily as needed for diarrhea  (Patient not taking: Reported on 8/10/2021)       loratadine-pseudoePHEDrine (CLARITIN-D 24-HOUR)  MG 24 hr tablet Take 1 tablet by mouth daily as needed        magnesium 250 MG tablet Take 1 tablet by mouth daily (Patient not taking: Reported on 8/10/2021)       metoclopramide (REGLAN) 5 MG tablet Take 5 mg by mouth 2 times daily as needed (Patient not taking: Reported on 8/10/2021)       multivitamin w/minerals (MULTI-VITAMIN) tablet Take 1 tablet by mouth daily       ondansetron (ZOFRAN) 8 MG tablet Take 1 tablet (8 mg) by mouth every 8 hours as needed (Nausea/Vomiting) (Patient not taking: Reported on 8/10/2021) 90 tablet 2     oxyCODONE (ROXICODONE) 5 MG tablet Take 1 tablet (5 mg) by mouth 3 times daily as needed for moderate to severe pain Maximum 3 pills/day. 90 tablet 0     pantoprazole (PROTONIX) 40 MG EC tablet TAKE 1 TABLET BY MOUTH EVERY DAY 30 tablet 1     potassium chloride ER (KLOR-CON M) 20 MEQ CR tablet TAKE ONE TABLET BY MOUTH ONCE DAILY (Patient not taking: Reported on 8/10/2021) 30 tablet 1     prochlorperazine (COMPAZINE) 10 MG tablet Take 1 tablet (10 mg) by mouth every 6 hours as needed for nausea or vomiting (Patient not taking: Reported on 8/10/2021) 60 tablet 3     simethicone 80 MG TABS Take 1 tablet by mouth every 6 hours as needed (bloating, gas, belching) 90 tablet 3     sucralfate (CARAFATE) 1 GM tablet Take 1 tablet (1 g) by mouth 4 times daily as needed (reflux.) (Patient not taking: Reported on 8/10/2021) 360 tablet 6     SUMAtriptan (IMITREX) 100 MG tablet Take 100 mg by mouth at onset of headache for migraine         Physical Examination:    [facility] 09/[facility]/2014   Wt Readings from Last 10 Encounters:   03/22/21 71 kg (156 lb 8 oz)   02/08/21 64.7 kg (142 lb 9.6 oz)   02/04/21 66.2 kg (146 lb)   12/07/20 64.6 kg (142 lb 8 oz)   11/20/20 70.8 kg (156 lb 1.6 oz)   11/04/20 72.6 kg (160 lb)    10/25/20 74 kg (163 lb 3.2 oz)   10/16/20 75.6 kg (166 lb 9.6 oz)   10/13/20 74.6 kg (164 lb 7.4 oz)   09/25/20 78 kg (172 lb)       Constitutional.  Looks well and in no apparent distress.   Eyes.  Without eye redness or apparent jaundice.   Respiratory.  Non labored breathing. Speaking in full sentences.    Skin.  No concerning skin rashes on the skin visualized.   Neurological.  Is alert and oriented.  Psychiatric.  Mood and affect seem appropriate.      The rest of a comprehensive physical examination is deferred due to Public Health Emergency video visit restrictions.      Laboratory Data:     Reviewed  11/15/2021.  CBC is normal.  CMP unremarkable except albumin 3.3.  Alkaline phosphatase 164.  Iron studies show ferritin 146, iron 100, TIBC 273 and iron saturation index 37%.    11/15/2021.  CT chest abdomen and pelvis shows postoperative changes without evidence of recurrence or metastatic disease.      2/8/2021.  EGD showed David en Y anatomy with generous pouch and stent bridging the GJ and proximal David   The stent was removed and a relook endoscopies showed widely patent prioximal David and associated superficial ulceration          Assessment and Plan:    Gastric adenocarcinona, HER-2 negative.   - Started on neoadjuvant FLOT on 3/7/2020. She has required a dose reduction of her oxaliplatin by 30% due to cold sensitivity/neuropathy with cycle #5. 5FU was decreased to 85% with cycle #6 due to mucositis/diarrhea/taste changes.   Received C#8 on 8/14/2020.  PET/CT on 9/2/2020 did not show evidence of disease.  Small peritoneal nodule was stable.  This was most consistent with fat necrosis.    CT chest abdomen and pelvis on 9/23/2020 also does not show evidence of malignancy recurrence and the peritoneal nodule in the right upper quadrant is stable.    Before definite surgery she had diagnostic laparoscopy which was negative for evidence of malignancy so on 10/21/2020 she had exploratory laparotomy with  extensive lysis of additions, partial omentectomy, resection of Gastrojejunal Anastomosis w/ En Bloc Subtotal Gastrectomy and modified D2 Lymphadenectomy with David-en-Y Gastrojejunostomy Reconstruction.  Final pathology showed Focal residual poorly differentiated adenocarcinoma (0.4cm) with signet-ring morphology status    post-neoadjuvant chemotherapy and all 9 lymph nodes were benign.  All margins were negative.  It was a [id number] lesion.    We decided to keep her on observation as she had received 6 months of neoadjuvant chemotherapy and then the definite surgery.      Plan to repeat scans every 6 months and do EGD as needed.      She is doing good. CT scan stable.      We should be repeating CT scan in 6 months.   She was followed in cancer survivorship program.    Abdominal pain.  This improved after EGD in December 2020 with a covered metal stent was placed from his stomach to jejunum across the stenotic region of David/enteral limb.  The stent was removed in February 2021.     Anemia.  Resolved. She had iron deficiency anemia and she received INFeD on 10/16/2020. Now iron studies are good.  Continue to monitor.  Continue vitamin B12.      Neuropathy.  He has chemotherapy related neuropathy.  In the past she tried acupuncture but it upset her nerve so she stopped it.  We again discussed that she can try laser therapy and look into it.  She is taking amitriptyline and she also takes oxycodone for the nerve pain.  Following with palliative care.      Port-A-Cath.  She will need port flushes every 8 weeks or so.     Return to clinic in 6 months with labs/CT scan prior.    All questions answered and she is agreeable with the plan.    Magaly Pollard MD

## 2021-11-18 NOTE — PROGRESS NOTES
Nathalie Bashir is a 56 year old who is being evaluated via a billable video visit.      How would you like to obtain your AVS? MyChart  If the video visit is dropped, the invitation should be resent by: Text to cell phone: [phone]  Will anyone else be joining your video visit? No    Video Start Time: 10:23 AM  Video-Visit Details    Type of service:  Video Visit    Video End Time:10:30 AM    Originating Location (pt. Location): Home    Distant Location (provider location):  [facility]     Platform used for Video Visit: [facility]    Oncology/Hematology Visit Note  Nov 18, 2021    Reason for Visit: Follow up of gastric adenocarcinoma.    History of Present Illness:     Please see previous notes for detail. I have copied and updated from prior notes.    Ms. Bashir is a 56 year old female with a history of gastric adenocarcinoma.  She was being followed for a pancreatic duct dilatation and pancreatic cyst which was noted in November 2018.  Since May 2019 she started noticing some nausea and vomiting and occasional abdominal discomfort.  She had an ultrasound in August 2019 which was essentially unremarkable.  She had a repeat endoscopic ultrasound on 10/29/2019 which showed increase in size of the cyst as well as dilation of the main pancreatic duct.  FNA and fluid analysis showed mucinous epithelium.  She was referred to Dr. 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  Surprisingly there was no pancreatic ductal neoplasm seen but on the resected specimen stomach cancer was noted.  It was poorly differentiated adenocarcinoma with poorly cohesive/signet ring cell type with proximal gastric mucosal and serosal margins being positive.  There was lymphovascular and perineural invasion seen.  22 lymph nodes were sampled and all were benign.  It was a pT4aN0 lesion.  HER-2/christine FISH was not amplified.         EUS on 3/3/2020 without clear evidence of neoplasm  endoscopically. Left gastric lymph node fine-needle aspiration was negative for carcinoma. Biopsy from the gastric jejunal anastomosis as well as lesser curvature of the stomach also did not show any malignancy.     She had a PET/CT on 3/13/2020 which showed soft tissue streaky density with increased FDG uptake adjacent to the pancreaticojejunostomy which could be neoplastic in origin.  There is mild increased FDG uptake in the gastric remnant.  Focal area of soft tissue thickening with fatty streakiness along medial laparotomy with increased FDG uptake which likely is inflammatory. Increased FDG uptake in thoracic esophagus which could be esophagitis.      She started on chemotherapy with 5FU, oxaliplatin, and Taxotere on 3/27/20. She was seen on 4/7/20 for evaluation of fevers. She was treated for possible pneumonia with Levaquin.      She was seen in clinic 4/13/20 for RUQ pain,CT and labs reassuring. She received cycles 2 and 3 of chemotherapy on 4/20/20 and 5/4/20. She was admitted from 5/21-5/23/20 with neutropenic aspiration pneumonia. She received cycle 4 on 5/27/20 with Neulasta support. PET/CT on 6/3/20 showed mild residual uptake at the site of gastrojejunal anastomosis which likely is physiologic.  There is almost resolution of soft tissue nodule next to the pancreaticoduodenal anastomosis without FDG uptake.  There is focal increased uptake in the left posterior acetabulum which could be artifact.     Decreased dose of oxaliplatin due to neuropathy/cold sensitivity on 6/11. Decreased 5FU to 85% with cycle #6 due to mucositis/diarrhea/taste changes. Treatment was held 7/9/20 due to diarrhea, dehydration, tachycardia, rash, and weight loss and she got cycle 7 on 7/15. On 7/20 she presented to the ED with RUQ abdominal pain, and was admitted 7/20-7/29 for acute cholangitis     She received C#8 FLOT on 8/14/2020.    PET/CT on 9/2/2020 did not show evidence of disease.  Small peritoneal nodule was a stable.   This was most consistent with fat necrosis.    CT chest abdomen and pelvis on 9/23/2020 also does not show evidence of malignancy recurrence and the peritoneal nodule in the right upper quadrant is stable.  Before definite surgery she had diagnostic laparoscopy which was negative for evidence of malignancy so on 10/21/2020 she had exploratory laparotomy with extensive lysis of additions, partial omentectomy, resection of Gastrojejunal Anastomosis w/ En Bloc Subtotal Gastrectomy and modified D2 Lymphadenectomy with David-en-Y Gastrojejunostomy Reconstruction.  Final pathology showed Focal residual poorly differentiated adenocarcinoma (0.4cm) with signet-ring morphology status    post-neoadjuvant chemotherapy and all 9 lymph nodes were benign.  All margins were negative.  It was a pT1bN0 lesion.    Before definite surgery she had diagnostic laparoscopy which was negative for evidence of malignancy so on 10/21/2020 she had exploratory laparotomy with extensive lysis of additions, partial omentectomy, resection of Gastrojejunal Anastomosis w/ En Bloc Subtotal Gastrectomy and modified D2 Lymphadenectomy with David-en-Y Gastrojejunostomy Reconstruction.  Final pathology showed Focal residual poorly differentiated adenocarcinoma (0.4cm) with signet-ring morphology status    post-neoadjuvant chemotherapy and all 9 lymph nodes were benign.  All margins were negative.  It was a [id number] lesion.    She was admitted from 11/4/2020-11/6/2020 for nausea vomiting and left upper quadrant pain and CT scan showed fluid collection in the left upper quadrant, likely hematoma.  Upper GI was negative for leak.  No drainage was recommended by IR.  He was discharged on 7 days of levofloxacin and Flagyl.      11/20/2020.  CT abdomen and pelvis showed postsurgical changes of Whipple and subtotal gastrectomy with David-en-Y reconstruction.  There is resolution of previously demonstrated fluid collection between the stomach and the spleen and  decrease in sigmoid thickening.  Small left pleural effusion with atelectasis adjacent to it.    12/7/2020.  EGD showed healthy-appearing and generous pouch (remaining the stomach) with widely patent end to side gastrojejunostomy, though with the David/enteral limb mildly stenostic and tortuous at its origin. Therefore a covered metal stent was placed from stomach to jejunum across the stenotic region, resulting in straightening of the course.    2/8/2021.  EGD was done and stent was removed.    5/21/2021.  CT chest abdomen and pelvis does not show evidence of recurrence of malignancy.    Interval History:  This is a video visit.  She feels good. Energy is improving. Abdomen feels better. Has some soreness. Eating well. Has occasional nausea. BMs are fine. Weight is stable. No bleeding. Neuropathy is stable in hands/feet. No dyspnea.     ECOG 1    ROS:  Otherwise a comprehensive review of the system was unremarkable.    I reviewed other history in epic as below.      Current Outpatient Medications   Medication Sig Dispense Refill     amitriptyline (ELAVIL) 100 MG tablet Take 1 tablet (100 mg) by mouth At Bedtime 30 tablet 3     amylase-lipase-protease (CREON 24) 72528-35992 units CPEP per EC capsule 2 capsules with meals and 1 capsule with snacks (Patient not taking: Reported on 8/10/2021) 240 capsule 4     calcium carbonate 500 mg, elemental, (OSCAL 500) 1250 (500 Ca) MG TABS tablet Take by mouth 3 times daily       Cyanocobalamin 1000 MCG CAPS Take by mouth daily       cyclobenzaprine (FLEXERIL) 10 MG tablet Take 10 mg by mouth 3 times daily as needed for muscle spasms       DULoxetine (CYMBALTA) 30 MG capsule Take 2 capsules (60 mg) by mouth daily 60 capsule 2     famotidine (PEPCID) 20 MG tablet Take 20 mg by mouth 2 times daily as needed        fluticasone (FLONASE) 50 MCG/ACT nasal spray Spray 1 spray into both nostrils daily       hydrOXYzine (VISTARIL) 25 MG capsule Take 25-50 mg by mouth 4 times daily as  needed for itching       ibuprofen (ADVIL/MOTRIN) 600 MG tablet Take 1 tablet (600 mg) by mouth every 6 hours as needed for moderate pain 50 tablet 0     lidocaine (XYLOCAINE) 2 % solution Swish and swallow 15 mLs in mouth every 3 hours as needed for moderate pain ; Max 8 doses/24 hour period. 100 mL 0     loperamide (IMODIUM) 2 MG capsule Take 4 mg by mouth 4 times daily as needed for diarrhea  (Patient not taking: Reported on 8/10/2021)       loratadine-pseudoePHEDrine (CLARITIN-D 24-HOUR)  MG 24 hr tablet Take 1 tablet by mouth daily as needed        magnesium 250 MG tablet Take 1 tablet by mouth daily (Patient not taking: Reported on 8/10/2021)       metoclopramide (REGLAN) 5 MG tablet Take 5 mg by mouth 2 times daily as needed (Patient not taking: Reported on 8/10/2021)       multivitamin w/minerals (MULTI-VITAMIN) tablet Take 1 tablet by mouth daily       ondansetron (ZOFRAN) 8 MG tablet Take 1 tablet (8 mg) by mouth every 8 hours as needed (Nausea/Vomiting) (Patient not taking: Reported on 8/10/2021) 90 tablet 2     oxyCODONE (ROXICODONE) 5 MG tablet Take 1 tablet (5 mg) by mouth 3 times daily as needed for moderate to severe pain Maximum 3 pills/day. 90 tablet 0     pantoprazole (PROTONIX) 40 MG EC tablet TAKE 1 TABLET BY MOUTH EVERY DAY 30 tablet 1     potassium chloride ER (KLOR-CON M) 20 MEQ CR tablet TAKE ONE TABLET BY MOUTH ONCE DAILY (Patient not taking: Reported on 8/10/2021) 30 tablet 1     prochlorperazine (COMPAZINE) 10 MG tablet Take 1 tablet (10 mg) by mouth every 6 hours as needed for nausea or vomiting (Patient not taking: Reported on 8/10/2021) 60 tablet 3     simethicone 80 MG TABS Take 1 tablet by mouth every 6 hours as needed (bloating, gas, belching) 90 tablet 3     sucralfate (CARAFATE) 1 GM tablet Take 1 tablet (1 g) by mouth 4 times daily as needed (reflux.) (Patient not taking: Reported on 8/10/2021) 360 tablet 6     SUMAtriptan (IMITREX) 100 MG tablet Take 100 mg by mouth at  onset of headache for migraine         Physical Examination:    LMP 09/23/2014   Wt Readings from Last 10 Encounters:   03/22/21 71 kg (156 lb 8 oz)   02/08/21 64.7 kg (142 lb 9.6 oz)   02/04/21 66.2 kg (146 lb)   12/07/20 64.6 kg (142 lb 8 oz)   11/20/20 70.8 kg (156 lb 1.6 oz)   11/04/20 72.6 kg (160 lb)   10/25/20 74 kg (163 lb 3.2 oz)   10/16/20 75.6 kg (166 lb 9.6 oz)   10/13/20 74.6 kg (164 lb 7.4 oz)   09/25/20 78 kg (172 lb)       Constitutional.  Looks well and in no apparent distress.   Eyes.  Without eye redness or apparent jaundice.   Respiratory.  Non labored breathing. Speaking in full sentences.    Skin.  No concerning skin rashes on the skin visualized.   Neurological.  Is alert and oriented.  Psychiatric.  Mood and affect seem appropriate.      The rest of a comprehensive physical examination is deferred due to Public Health Emergency video visit restrictions.      Laboratory Data:     Reviewed  11/15/2021.  CBC is normal.  CMP unremarkable except albumin 3.3.  Alkaline phosphatase 164.  Iron studies show ferritin 146, iron 100, TIBC 273 and iron saturation index 37%.    11/15/2021.  CT chest abdomen and pelvis shows postoperative changes without evidence of recurrence or metastatic disease.      2/8/2021.  EGD showed David en Y anatomy with generous pouch and stent bridging the GJ and proximal David   The stent was removed and a relook endoscopies showed widely patent prioximal David and associated superficial ulceration          Assessment and Plan:    Gastric adenocarcinona, HER-2 negative.   - Started on neoadjuvant FLOT on 3/7/2020. She has required a dose reduction of her oxaliplatin by 30% due to cold sensitivity/neuropathy with cycle #5. 5FU was decreased to 85% with cycle #6 due to mucositis/diarrhea/taste changes.   Received C#8 on 8/14/2020.  PET/CT on 9/2/2020 did not show evidence of disease.  Small peritoneal nodule was stable.  This was most consistent with fat necrosis.    CT chest  abdomen and pelvis on 9/23/2020 also does not show evidence of malignancy recurrence and the peritoneal nodule in the right upper quadrant is stable.    Before definite surgery she had diagnostic laparoscopy which was negative for evidence of malignancy so on 10/21/2020 she had exploratory laparotomy with extensive lysis of additions, partial omentectomy, resection of Gastrojejunal Anastomosis w/ En Bloc Subtotal Gastrectomy and modified D2 Lymphadenectomy with David-en-Y Gastrojejunostomy Reconstruction.  Final pathology showed Focal residual poorly differentiated adenocarcinoma (0.4cm) with signet-ring morphology status    post-neoadjuvant chemotherapy and all 9 lymph nodes were benign.  All margins were negative.  It was a [id number] lesion.    We decided to keep her on observation as she had received 6 months of neoadjuvant chemotherapy and then the definite surgery.      Plan to repeat scans every 6 months and do EGD as needed.      She is doing good. CT scan stable.      We should be repeating CT scan in 6 months.   She was followed in cancer survivorship program.    Abdominal pain.  This improved after EGD in December 2020 with a covered metal stent was placed from his stomach to jejunum across the stenotic region of David/enteral limb.  The stent was removed in February 2021.     Anemia.  Resolved. She had iron deficiency anemia and she received INFeD on 10/16/2020. Now iron studies are good.  Continue to monitor.  Continue vitamin B12.      Neuropathy.  He has chemotherapy related neuropathy.  In the past she tried acupuncture but it upset her nerve so she stopped it.  We again discussed that she can try laser therapy and look into it.  She is taking amitriptyline and she also takes oxycodone for the nerve pain.  Following with palliative care.      Port-A-Cath.  She will need port flushes every 8 weeks or so.     Return to clinic in 6 months with labs/CT scan prior.    All questions answered and she is  agreeable with the plan.    Magaly Pollard MD

## 2021-11-27 ENCOUNTER — HOSPITAL ENCOUNTER (EMERGENCY)
Facility: CLINIC | Age: 56
Discharge: HOME | End: 2021-11-27
Attending: EMERGENCY MEDICINE | Admitting: EMERGENCY MEDICINE
Payer: MEDICARE

## 2021-11-27 VITALS
OXYGEN SATURATION: 99 % | HEART RATE: 91 BPM | WEIGHT: 156 LBS | HEIGHT: 68 IN | TEMPERATURE: 98.4 F | DIASTOLIC BLOOD PRESSURE: 55 MMHG | BODY MASS INDEX: 23.64 KG/M2 | RESPIRATION RATE: 18 BRPM | SYSTOLIC BLOOD PRESSURE: 107 MMHG

## 2021-11-27 DIAGNOSIS — G43.809 OTHER MIGRAINE WITHOUT STATUS MIGRAINOSUS, NOT INTRACTABLE: ICD-10-CM

## 2021-11-27 PROCEDURE — 99284 EMERGENCY DEPT VISIT MOD MDM: CPT | Performed by: EMERGENCY MEDICINE

## 2021-11-27 PROCEDURE — 99284 EMERGENCY DEPT VISIT MOD MDM: CPT | Mod: 25 | Performed by: EMERGENCY MEDICINE

## 2021-11-27 PROCEDURE — 258N000003 HC RX IP 258 OP 636: Performed by: EMERGENCY MEDICINE

## 2021-11-27 PROCEDURE — 96374 THER/PROPH/DIAG INJ IV PUSH: CPT | Performed by: EMERGENCY MEDICINE

## 2021-11-27 PROCEDURE — 96372 THER/PROPH/DIAG INJ SC/IM: CPT | Mod: 59 | Performed by: EMERGENCY MEDICINE

## 2021-11-27 PROCEDURE — 96361 HYDRATE IV INFUSION ADD-ON: CPT | Performed by: EMERGENCY MEDICINE

## 2021-11-27 PROCEDURE — 250N000012 HC RX MED GY IP 250 OP 636 PS 637: Performed by: EMERGENCY MEDICINE

## 2021-11-27 PROCEDURE — 250N000011 HC RX IP 250 OP 636: Performed by: EMERGENCY MEDICINE

## 2021-11-27 RX ORDER — HYDROMORPHONE HYDROCHLORIDE 1 MG/ML
0.5 INJECTION, SOLUTION INTRAMUSCULAR; INTRAVENOUS; SUBCUTANEOUS ONCE
Status: COMPLETED | OUTPATIENT
Start: 2021-11-27 | End: 2021-11-27

## 2021-11-27 RX ORDER — SUMATRIPTAN 6 MG/.5ML
6 INJECTION, SOLUTION SUBCUTANEOUS ONCE
Status: COMPLETED | OUTPATIENT
Start: 2021-11-27 | End: 2021-11-27

## 2021-11-27 RX ADMIN — SUMATRIPTAN 6 MG: 6 INJECTION SUBCUTANEOUS at 11:35

## 2021-11-27 RX ADMIN — HYDROMORPHONE HYDROCHLORIDE 0.5 MG: 1 INJECTION, SOLUTION INTRAMUSCULAR; INTRAVENOUS; SUBCUTANEOUS at 12:42

## 2021-11-27 RX ADMIN — SODIUM CHLORIDE 1000 ML: 9 INJECTION, SOLUTION INTRAVENOUS at 11:35

## 2021-11-27 ASSESSMENT — ENCOUNTER SYMPTOMS
NECK PAIN: 0
PHOTOPHOBIA: 1
FEVER: 1
VOMITING: 0
NAUSEA: 0
PSYCHIATRIC NEGATIVE: 1
HEADACHES: 1
SHORTNESS OF BREATH: 0
ABDOMINAL PAIN: 0
MUSCULOSKELETAL NEGATIVE: 1
FLANK PAIN: 0

## 2021-11-27 ASSESSMENT — MIFFLIN-ST. JEOR: SCORE: 1351.11

## 2021-11-27 NOTE — DISCHARGE INSTRUCTIONS
Please follow-up with your regular physician for reevaluation.  It may help if you take the regular dose of Imitrex rather than half dose.  Drink plenty of fluids. Return if worse

## 2021-11-27 NOTE — ED TRIAGE NOTES
Triage Assessment & Note:    Patient presents with: PT c/o migraine that started 11/25. PT reports that it has not gotten better since then. She reports this is a normal migraine for her.     Home Treatments/Remedies: Imitrex     Febrile / Afebrile? Afebrile     Duration of C/o:  3 days     Maurice Shelton RN  November 27, 2021

## 2021-11-27 NOTE — ED PROVIDER NOTES
"    [facility] ([facility])  11/27/21  History     Chief Complaint   Patient presents with     Headache     The history is provided by the patient and medical records.     Nathalie Bashir is a 56 year old female with a past medical history significant for migraine with aura, gastric adenocarcinoma and chronic pain disorder who presents to the Emergency Department for evaluation of headache.    Patient reports that she began to experience the \"bad migraine\" 2 days ago.  She states that this migraine is worse than the migraine she has had in the past.  She describes the location of the headache starting at the center of her forehead which radiates to the right side of her head downwards. She says that 1 day ago she began to have head pounding sensation along with light sensitivity.  She adds that when she normally has migraines she takes half a pill of her Imitrex 100 mg to see if it helps and then would take the other half if the headache would not go away.  She notes that 1 day ago she took half a pill and had no relief and then took the other half and still saw no relief to her symptoms.  She adds that she has not taken any Advil, aspirin, or Tylenol due to her allergies.  She says that she has not used other medications aside from Imitrex to treat her migraines.  She reports that she had a fever of 99.2.  She says that she has had no episodes of vomiting which is unusual with her migraines.  She adds that she normally does not come to the ED for her migraines and tries to self treat her migraines at home.  Patient reports that she has gastric adenocarcinoma and is not currently on chemotherapy treatment.  Patient denies being on immunosuppressants.  Patient denies past heart history.    Patient is fully vaccinated and has received all 3 doses of COVID vaccine.    Past Medical History:   Diagnosis Date     Allergic rhinitis      Cancer (H)     stomach cancer     Chronic pain      " Depression      Gastric adenocarcinoma (H)      Lumbago      Migraine      Opioid dependence (H)      Other chronic pain     low back     Sacroiliitis (H)     low back pain     Trochanteric bursitis     leg length discrrepancy, hip pain       Past Surgical History:   Procedure Laterality Date     ARTHROPLASTY HIP Left 2016     BACK SURGERY      L4-5 decompression     C FUSION OF SACROILIAC JOINT      Description: Arthrodesis Sacroiliac Joint Left;  Recorded: 10/07/2011;     C REPAIR DETACH RETINA,SCLERAL BUCKLE       CATARACT IOL, RT/LT       CHOLECYSTECTOMY N/A 1/28/2020    Procedure: Cholecystectomy;  Surgeon: Yandel Mallory MD;  Location: [facility] OR     ENDOSCOPIC RETROGRADE CHOLANGIOPANCREATOGRAM N/A 7/27/2020    Procedure: ENDOSCOPIC RETROGRADE CHOLANGIOPANCREATOGRAPHY  **Latex Allergy** with jejunal biopsies;  Surgeon: Jeet Aviles MD;  Location:  OR     ESOPHAGOSCOPY, GASTROSCOPY, DUODENOSCOPY (EGD), COMBINED N/A 3/3/2020    Procedure: ESOPHAGOGASTRODUODENOSCOPY, WITH ENDOSCOPIC US (enoxaparin);  Surgeon: Bakari Plata MD;  Location:  GI     ESOPHAGOSCOPY, GASTROSCOPY, DUODENOSCOPY (EGD), COMBINED N/A 12/7/2020    Procedure: Upper Endoscopy with stent placement;  Surgeon: Jeet Aviles MD;  Location:  OR     ESOPHAGOSCOPY, GASTROSCOPY, DUODENOSCOPY (EGD), COMBINED N/A 2/8/2021    Procedure: ESOPHAGOGASTRODUODENOSCOPY (EGD)AND STENT REMOVAL;  Surgeon: Jeet Aviles MD;  Location:  OR     EYE SURGERY       GASTRECTOMY N/A 10/21/2020    Procedure: Open subtotal gastectomy with David en Y Reconstruction; D1 Lymph Node Disection  **Latex Allergy**;  Surgeon: Yandel Mallory MD;  Location: [facility] OR     IR CHEST PORT PLACEMENT > 5 YRS OF AGE  3/26/2020     IR LUMBAR EPIDURAL STEROID INJECTION  8/25/2009     IR LUMBAR EPIDURAL STEROID INJECTION  9/2/2009     IR LUMBAR EPIDURAL STEROID INJECTION  9/25/2009     IR LUMBAR EPIDURAL STEROID INJECTION  11/23/2009     IR LUMBAR EPIDURAL  STEROID INJECTION  4/1/2010     IR LUMBAR EPIDURAL STEROID INJECTION  9/1/2010     IR LUMBAR EPIDURAL STEROID INJECTION  3/10/2011     IR LUMBAR EPIDURAL STEROID INJECTION  8/30/2011     LAPAROSCOPY DIAGNOSTIC (GENERAL) N/A 10/13/2020    Procedure: Diagnostic laparoscopy with peritoneal washings  **Latex Allergy**;  Surgeon: Yandel Mallory MD;  Location: [facility] OR     OPEN REDUCTION INTERNAL FIXATION RODDING INTRAMEDULLARY FEMUR Left 12/23/2014    Procedure: OPEN REDUCTION INTERNAL FIXATION RODDING INTRAMEDULLARY FEMUR;  Surgeon: Arnol Santiago MD;  Location: [facility] OR     ORTHOPEDIC SURGERY       PICC DOUBLE LUMEN PLACEMENT Right 02/07/2020    5 fr double lumen 41 cm     KY LAMINEC/FACETECT/FORAMIN,LUMBAR 1 SEG      Description: Laminectomy Decompress, Facetectomy, Foraminotomy Lumbar Seg;  Recorded: 04/12/2012;     REMOVE HARDWARE LOWER EXTREMITY Left 4/7/2015    Procedure: REMOVE HARDWARE LOWER EXTREMITY;  Surgeon: Arnol Santiago MD;  [facility] OR     REMOVE HARDWARE RODDING INTRAMEDULLARY FEMUR Left 12/17/2015    Procedure: REMOVE HARDWARE RODDING INTRAMEDULLARY FEMUR;  Surgeon: Arnol Santiago MD;  [facility]     RESECT BONE LOWER EXTREMITY Left 12/23/2014    Procedure: RESECT BONE LOWER EXTREMITY;  Surgeon: Arnol Santiago MD;  [facility]     SHORTENING OSTEOPLASTY FEMUR W/ IM NAILING       WHIPPLE PROCEDURE N/A 1/28/2020    Procedure: Open Whipple procedure and Cholecystectomy;  Surgeon: Yandel Mallory MD;  Location: [facility] OR     WHIPPLE PROCEDURE         Family History   Problem Relation Age of Onset     Heart Failure Mother 77     Dementia Father         frontal lobe     No Known Problems Sister      No Known Problems Brother      Anesthesia Reaction No family hx of      Deep Vein Thrombosis No family hx of        Social History     Tobacco Use     Smoking status: Former Smoker     Packs/day: 0.50     Years: 25.00     Pack years: 12.50     Types: Cigarettes     Quit date: 1/1/2020     Years  since quittin.9     Smokeless tobacco: Never Used   Substance Use Topics     Alcohol use: No       No current facility-administered medications for this encounter.     Current Outpatient Medications   Medication     amitriptyline (ELAVIL) 100 MG tablet     amylase-lipase-protease (CREON 24) 55611-67987 units CPEP per EC capsule     calcium carbonate 500 mg, elemental, (OSCAL 500) 1250 (500 Ca) MG TABS tablet     Cyanocobalamin 1000 MCG CAPS     cyclobenzaprine (FLEXERIL) 10 MG tablet     DULoxetine (CYMBALTA) 30 MG capsule     famotidine (PEPCID) 20 MG tablet     fluticasone (FLONASE) 50 MCG/ACT nasal spray     hydrOXYzine (VISTARIL) 25 MG capsule     ibuprofen (ADVIL/MOTRIN) 600 MG tablet     lidocaine (XYLOCAINE) 2 % solution     loperamide (IMODIUM) 2 MG capsule     loratadine-pseudoePHEDrine (CLARITIN-D 24-HOUR)  MG 24 hr tablet     magnesium 250 MG tablet     metoclopramide (REGLAN) 5 MG tablet     multivitamin w/minerals (MULTI-VITAMIN) tablet     ondansetron (ZOFRAN) 8 MG tablet     oxyCODONE (ROXICODONE) 5 MG tablet     pantoprazole (PROTONIX) 40 MG EC tablet     potassium chloride ER (KLOR-CON M) 20 MEQ CR tablet     prochlorperazine (COMPAZINE) 10 MG tablet     simethicone 80 MG TABS     sucralfate (CARAFATE) 1 GM tablet     SUMAtriptan (IMITREX) 100 MG tablet        Allergies   Allergen Reactions     Gluten Meal Palpitations     Augmentin Rash     Patient tolerated Zosyn in 2020     Oxycontin [Oxycodone]      Confusion, loopy, oxycodone is tolerated     Pregabalin      interfered with Cymbalta     Topiramate      Tongue numbness, taste alteration, irritable      Acetaminophen Nausea     Urine retention       Dust Mite Extract      Gabapentin Dizziness and GI Disturbance     Ketorolac Headache     Latex Rash     Methadone Fatigue     Mold      Naprosyn [Naproxen] Dizziness and GI Disturbance     Seasonal Allergies         I have reviewed the Medications, Allergies, Past Medical and Surgical  "History, and Social History in the Epic system.    Review of Systems   Constitutional: Positive for fever (99.2).   Eyes: Positive for photophobia.   Respiratory: Negative for shortness of breath.    Cardiovascular: Negative for chest pain.   Gastrointestinal: Negative for abdominal pain, nausea and vomiting.   Genitourinary: Negative for flank pain.   Musculoskeletal: Negative.  Negative for neck pain.   Skin: Negative for rash.   Neurological: Positive for headaches.   Psychiatric/Behavioral: Negative.    All other systems reviewed and are negative.      Physical Exam   BP: 112/81  Pulse: 105  Temp: 99.2  F (37.3  C)  Resp: 16  Height: 172.7 cm (5' 8\")  Weight: 70.8 kg (156 lb)  SpO2: 99 %      Physical Exam  HENT:      Head:          Physical Exam   Constitutional:   well nourished, well developed, resting comfortably   HENT:   Head: Normocephalic and atraumatic.   Eyes: Conjunctivae are normal. Pupils are equal, round, and reactive to light.   pharynx has no erythema or exudate, mucous membranes are moist  Neck:   no adenopathy, no bony tenderness  Cardiovascular: regular rate and rhythm without murmurs or gallops  Pulmonary/Chest: Clear to auscultation bilaterally, with no wheezes or retractions. No respiratory distress.  GI: Soft with good bowel sounds.  Non-tender, non-distended, with no guarding, no rebound, no peritoneal signs.   Musculoskeletal:  no edema  Skin: Skin is warm and dry. No rash noted.   Neurological: alert and oriented to person, place, and time. Nonfocal exam, speech normal, mental status intact, cranial nerves II through XII grossly intact, able to ambulate without difficulty   Psychiatric:  normal mood and affect.      ED Course     At 1120 AM the patient was seen and examined by Nakia Simon MD in Room ED18.        Procedures               The medical record was reviewed and interpreted.     No results found for this or any previous visit (from the past 24 hour(s)).  Medications   0.9% " sodium chloride BOLUS (0 mLs Intravenous Stopped 11/27/21 1300)   SUMAtriptan (IMITREX) injection 6 mg (6 mg Subcutaneous Given 11/27/21 1135)   HYDROmorphone (PF) (DILAUDID) injection 0.5 mg (0.5 mg Intravenous Given 11/27/21 1242)             Assessments & Plan (with Medical Decision Making)       I have reviewed the nursing notes.  Emergency Department course:  The patient was seen and examined at 1120 am.   I treated the patient with a liter bolus of normal saline IV as well as Imitrex subcutaneously.    I reevaluated her at approximately 1235 pm.  She states her headache was slomewhat better but has not yet resolved.  I then treated her with Dilaudid IV.    The patient's headache had almost entirely resolved 1315 pm and she requested discharge home..  I believe she is safe to be discharged home.  I recommend that she try a full dose of Imitrex next time she develops a headache.  I would like to follow-up with her regular physician next week for reevaluation  I counseled the patient in my usual and standard fashion for headache and reasons to return to the Emergency Department.   I have reviewed the findings, diagnosis, plan and need for follow up with the patient.    Discharge Medication List as of 11/27/2021  1:42 PM          Final diagnoses:   Other migraine without status migrainosus, not intractable     I, Amy Phoenix , am serving as a trained medical scribe to document services personally performed by Nakia Simon MD, based on the provider's statements to me.      INakia MD, was physically present and have reviewed and verified the accuracy of this note documented by Amy Phoenix.     This note was created in part by the use of Dragon voice recognition dictation system. Inadvertent grammatical errors and typographical errors may still exist.  MD Nakia Alves MD  11/27/2021   [facility], located within [facility] EMERGENCY DEPARTMENT     Nakia Simon MD  11/27/21 4182

## 2021-12-15 DIAGNOSIS — C16.3 MALIGNANT NEOPLASM OF PYLORIC ANTRUM (H): ICD-10-CM

## 2021-12-15 DIAGNOSIS — D49.0 IPMN (INTRADUCTAL PAPILLARY MUCINOUS NEOPLASM): ICD-10-CM

## 2021-12-15 DIAGNOSIS — R10.13 ABDOMINAL PAIN, EPIGASTRIC: ICD-10-CM

## 2021-12-16 RX ORDER — PANTOPRAZOLE SODIUM 40 MG/1
TABLET, DELAYED RELEASE ORAL
Qty: 30 TABLET | Refills: 1 | Status: SHIPPED | OUTPATIENT
Start: 2021-12-16 | End: 2021-12-22

## 2021-12-21 NOTE — PROGRESS NOTES
Patient calling for medication refill. Patient is out of the lexapro at this time. She will be home from college today and would like it sent to pharmacy around here. Please call with any questions.     Medication(s) set up as pending orders from medication list.    Preferred pharmacy is set up and verified.    Patient told to check with pharmacy after 48 hours.    Patient was advised they would be notified only if there is a problem concerning the refill.    This is a recent snapshot of the patient's [facility] medical record.  For current drug dose and complete information and questions, call [phone] or In Basket pool, [facility] ([id number])  CSN Number:  [id number]

## 2021-12-22 DIAGNOSIS — C16.3 MALIGNANT NEOPLASM OF PYLORIC ANTRUM (H): ICD-10-CM

## 2021-12-22 DIAGNOSIS — R10.13 ABDOMINAL PAIN, EPIGASTRIC: ICD-10-CM

## 2021-12-22 DIAGNOSIS — D49.0 IPMN (INTRADUCTAL PAPILLARY MUCINOUS NEOPLASM): ICD-10-CM

## 2021-12-22 RX ORDER — PANTOPRAZOLE SODIUM 40 MG/1
TABLET, DELAYED RELEASE ORAL
Qty: 90 TABLET | Refills: 1 | Status: SHIPPED | OUTPATIENT
Start: 2021-12-22 | End: 2022-05-31

## 2021-12-28 ENCOUNTER — MYC REFILL (OUTPATIENT)
Dept: PALLIATIVE MEDICINE | Facility: CLINIC | Age: 56
End: 2021-12-28
Payer: MEDICARE

## 2021-12-28 DIAGNOSIS — G89.3 CHRONIC PAIN DUE TO NEOPLASM: ICD-10-CM

## 2021-12-28 DIAGNOSIS — M79.2 NEUROPATHIC PAIN: ICD-10-CM

## 2021-12-28 RX ORDER — OXYCODONE HYDROCHLORIDE 5 MG/1
5 TABLET ORAL 3 TIMES DAILY PRN
Qty: 90 TABLET | Refills: 0 | Status: SHIPPED | OUTPATIENT
Start: 2021-12-29 | End: 2022-01-13

## 2021-12-28 NOTE — TELEPHONE ENCOUNTER
Received [employer] message from patient requesting refill of oxycodone.     Last refill: 12/1/2021  Last office visit: 10/28/2021  Scheduled for follow up 1/13/2022    Will route request to MD for review.     Reviewed [place]  Report.

## 2021-12-29 DIAGNOSIS — G89.3 CHRONIC PAIN DUE TO NEOPLASM: ICD-10-CM

## 2021-12-29 DIAGNOSIS — M79.2 NEUROPATHIC PAIN: ICD-10-CM

## 2021-12-29 RX ORDER — DULOXETIN HYDROCHLORIDE 30 MG/1
90 CAPSULE, DELAYED RELEASE ORAL DAILY
Qty: 90 CAPSULE | Refills: 2 | Status: SHIPPED | OUTPATIENT
Start: 2021-12-29 | End: 2022-02-03

## 2021-12-29 NOTE — TELEPHONE ENCOUNTER
Received [employer] message from patient requesting refill of cymbalta. Pt reporting progression of neuropathic pain up to her knees from her feet. Spoke with Dr. Millard who would like pt to increase cymbalta to 90 mg daily until her scheduled follow up visit.     Last office visit: 10/28/21  Scheduled for follow up 1/13/22     Will route request to MD for review.

## 2022-01-10 NOTE — ADDENDUM NOTE
Addended by: LONG STEVE on: 12/14/2020 07:25 AM     Modules accepted: Orders     [FreeTextEntry1] : YORDAN CHRISTIAN  is a 83 year old  F\par \par there is a history of coronary artery disease, peripheral arterial disease, hypertension, hyperlipidemia, possible sarcoidosis, breast cancer, cerebrovascular disease, and cognitive dysfunction, thrombocytosis followed by Dr. Jaimes\par \par there is report of an occluded aorta with collateralization and prior aortic biiliac endarterectomy\par coronary disease with an anteroapical MI and PCI of the LAD 2003\par \par S/p diagnostic peripheral angiogram 6/2021.  No significant stenosis in bilateral lower extremities and aortoiliofemoral.  This was performed due to non healing LE ankle ulcer above L malleolus, which has since healed s/p wound care. \par \par Pt has recently had issues w/ vaginal bleeding. S/P D&C done with Dr. Veras at [facility] 9/27/21. She was noted to have uterine cancer and has ongoing followup. She is now S/P hysterectomy with no further bleeding issues, now she is only taking single antiplatelet rx. \par \par PCP 9/7/21 - creat had gone up over 3 so ramipril was discontinued.  There after creat returned to baseline 1.18. \par She reports at all other doctors since then her BP has been elevated. \par \par Her aide Addie is with her today and assists in providing hx.\par Despite cognitive deficits pt is exceedingly active. \par S/he denies chest pain, pressure, palpitations, unusual shortness of breath, orthopnea, LE edema, lightheadedness, dizziness, near syncope or syncope. \par   \par Past testing for reference: \par SHAUNNA L 0.76, R 0.87, biphasic waves\par LE art u/s with mod b/l disease biphasic throughout and 2 vessel runoff b/l\par \par EKG 9/16/21: SB at 49 bpm, MO interval 198 ms, QTc 428 m, anteroseptal MI and low voltage, nonspecific ST-T wave abnormalities \par \par Labs 8/2/21: Hgb 15.6, na 141, K 4, Cr 1.2 \par 8/2021 LDL 86\par \par  3/24/21 echocardiogram demonstrates LVEF 40-45%, hypokinesis of septum, apex, inferior walls, borderline PH. Mild MD. Minor reduction in LVEF from 50%. \par \par Carotid doppler shows mild nonobstructive atherosclerosis \par \par Abd US 3/24/21 shows mild atherosclerosis, no AAA.\par \par Nuclear stress test 4/6/21 shows med-svr apical/inferior, anterior wall defects that are fixed suggestive of infarct. Akinetic anterior wall noted. There are no sig changes from prior, known ICMP. \par \par

## 2022-01-13 ENCOUNTER — PATIENT OUTREACH (OUTPATIENT)
Dept: ONCOLOGY | Facility: CLINIC | Age: 57
End: 2022-01-13
Payer: MEDICARE

## 2022-01-13 ENCOUNTER — VIRTUAL VISIT (OUTPATIENT)
Dept: ONCOLOGY | Facility: CLINIC | Age: 57
End: 2022-01-13
Attending: STUDENT IN AN ORGANIZED HEALTH CARE EDUCATION/TRAINING PROGRAM
Payer: MEDICARE

## 2022-01-13 DIAGNOSIS — C16.8 MALIGNANT NEOPLASM OF OVERLAPPING SITES OF STOMACH (H): Primary | ICD-10-CM

## 2022-01-13 DIAGNOSIS — Z51.5 ENCOUNTER FOR PALLIATIVE CARE: ICD-10-CM

## 2022-01-13 DIAGNOSIS — T40.2X5A THERAPEUTIC OPIOID INDUCED CONSTIPATION: ICD-10-CM

## 2022-01-13 DIAGNOSIS — K59.03 THERAPEUTIC OPIOID INDUCED CONSTIPATION: ICD-10-CM

## 2022-01-13 DIAGNOSIS — M79.2 NEUROPATHIC PAIN: ICD-10-CM

## 2022-01-13 DIAGNOSIS — G89.3 CHRONIC PAIN DUE TO NEOPLASM: ICD-10-CM

## 2022-01-13 DIAGNOSIS — Z79.891 ENCOUNTER FOR LONG-TERM USE OF OPIATE ANALGESIC: ICD-10-CM

## 2022-01-13 DIAGNOSIS — R26.89 BALANCE PROBLEMS: ICD-10-CM

## 2022-01-13 PROCEDURE — G0463 HOSPITAL OUTPT CLINIC VISIT: HCPCS | Mod: PN,RTG | Performed by: STUDENT IN AN ORGANIZED HEALTH CARE EDUCATION/TRAINING PROGRAM

## 2022-01-13 PROCEDURE — 99215 OFFICE O/P EST HI 40 MIN: CPT | Mod: 95 | Performed by: STUDENT IN AN ORGANIZED HEALTH CARE EDUCATION/TRAINING PROGRAM

## 2022-01-13 RX ORDER — OXYCODONE HYDROCHLORIDE 5 MG/1
5 TABLET ORAL 4 TIMES DAILY PRN
Qty: 120 TABLET | Refills: 0 | Status: SHIPPED | OUTPATIENT
Start: 2022-01-13 | End: 2022-02-07

## 2022-01-13 RX ORDER — SENNOSIDES 8.6 MG
TABLET ORAL
Qty: 120 TABLET | Refills: 1 | Status: SHIPPED | OUTPATIENT
Start: 2022-01-13

## 2022-01-13 NOTE — LETTER
1/13/2022         RE: Nathalie Bashir  [address] [address]        Dear Colleague,    Thank you for referring your patient, Nathalie Bashir, to the [facility] [place]. Please see a copy of my visit note below.    Nathalie Bashir is a 56 year old who is being evaluated via a billable video visit.      How would you like to obtain your AVS? MyChart  If the video visit is dropped, the invitation should be resent by: Text to cell phone: [phone]  Will anyone else be joining your video visit? No      Palliative Care Progress Note    Patient Name: Nathalie Bashir  Primary Provider: Marianne Gonzalez    Chief Complaint/Patient ID: 57 yo F with gastric adenocarcinoma              - Found during Whipple 1/2020 for presumed IPMN - no pancreatic malignancy found, but had poorly differentiated adenoca.  Received neoadjuvant chemo (5FU, leucovorin, oxaliplatin, docetaxel) with curative-intent subtotal gastrectomy, David-en-Y gastrojejunostomy 10/2020.  Required stenting of the jejunum for stenosis causing pain, removed 2/8/21.      Chronic and chemo-induced neuropathy - intol of gabapentin, pregabalin, duloxetine somewhat effective, on TID oxycodone    Last Palliative care appointment: 10/28/21 with me. Increased cymbalta, rec trial of topical aspercream.     Reviewed: Yes, no concerns.    Interim History:  Nathalie Bashir 56 year old female is seen today for follow up with Palliative Care via billable video visit.      Neuropathy continues to be bad in her hands and feet, primarily pins-and-needles sensations. She also has numbness up to her knees. She does think the increase in Cymbalta to 90 mg daily has been helpful. Previously did not tolerate gabapentin or Lyrica and did not like acupuncture for pain in the past. She has used compression stockings on her feet/legs and did not feel it made a difference on her neuropathy.    She also notes that her arthritis pain is getting  worse, particularly in her right shoulder and her hands. She has started to notice changes in her knuckles.    Overall she is having increased difficulty tolerating day-to-day tasks. Continues taking oxycodone 5 mg 3 times daily but has days where this does not cover her the full day and she really struggles.    Also notes some ongoing issues with constipation. There was some findings seen on her recent CT scan of mild to moderate stool burden. She has been using Benefiber and MiraLAX.     Social History:  Lives with her , son, and 3 grandchildren  Retired, worked in special education  Social History     Tobacco Use     Smoking status: Former Smoker     Packs/day: 0.50     Years: 25.00     Pack years: 12.50     Types: Cigarettes     Quit date: 2020     Years since quittin.0     Smokeless tobacco: Never Used   Substance Use Topics     Alcohol use: No     Drug use: No       Family History- Reviewed in Epic.    Allergies   Allergen Reactions     Gluten Meal Palpitations     Augmentin Rash     Patient tolerated Zosyn in 2020     Oxycontin [Oxycodone]      Confusion, loopy, oxycodone is tolerated     Pregabalin      interfered with Cymbalta     Topiramate      Tongue numbness, taste alteration, irritable      Acetaminophen Nausea     Urine retention       Dust Mite Extract      Gabapentin Dizziness and GI Disturbance     Ketorolac Headache     Latex Rash     Methadone Fatigue     Mold      Naprosyn [Naproxen] Dizziness and GI Disturbance     Seasonal Allergies        Medications- Reviewed in Epic.    Past Medical History- Reviewed in [facility].    Past Surgical History- Reviewed in Epic.    Review of Systems:   ROS: 10 point ROS neg other than the symptoms noted above in the HPI.    Physical Exam:   Constitutional: Alert, pleasant, no apparent distress. Sitting up in chair.  Eyes: Sclera non-icteric, no eye discharge.  ENT: No nasal discharge. Ears grossly normal.  Respiratory: Unlabored respirations.  Speaking in full sentences.  Musculoskeletal: Extremities appear normal- no gross deformities noted. No edema noted on upper body.   Skin: No suspicious lesions or rashes on visible skin.  Neurologic: Clear speech, no aphasia. No facial droop.  Psychiatric: Mentation appears normal, appropriate attention. Affect normal/bright. Does not appear anxious or depressed.      Key Data Reviewed:  LABS: 11/15/2021- Cr 0.91, GFR 71, Albumin 3.3, LFTs WNL. WBC 5.5, Hgb 12.8, Plts 197.     IMAGING: CT CAP 11/15/2021- IMPRESSION: In this patient with history of gastric adenocarcinoma, the current scan compared to prior CT chest abdomen and pelvis 5/21/2021 shows no evidence of recurrent or metastatic disease in the chest, abdomen, and pelvis.    Impression & Recommendations & Counseling:  Nathalie Bashir is a 56 year old female with history of gastric adenocarcinoma found during Whipple 1/2020 for presumed IPMN, no pancreatic malignancy found at that time, now s/p neoadjuvant chemotherapy and curative intent surgery 10/2020.  Is now on surveillance and feeling more back to normal.  Seen for symptom management of peripheral neuropathy that worsened in the setting of chemo.     Pain, neuropathic and arthritic  Balance issues - Has noticed some improvement in neuropathy with increased dose of Cymbalta.  Still having ongoing symptoms that are not fully alleviated by current regimen.  -Continue Cymbalta 90mg daily.   -I have placed a referral to oncology PMNR physician Dr. Dobbs for a consult regarding additional suggestions related to severity of her neuropathy and balance issues.  -Discussed that I would be okay with her taking a fourth dose of oxycodone on days when pain is very severe.  Prescription changed to 5 mg up to 4 times daily as needed.  -She also takes Elavil for sleep. If she notices increased hangover effect, it could be the Cymbalta prolonging metabolism of Elavil and the Elavil may need to be decreased. If that is  "not sufficient, may need to look into different sleep aid.  - Discussed broadly that she is now in \"survivorship\" with planned surveillance every 6 months after curative-intent.  If we got to a stable point in her care/neuropathy, would recommend transitioning care back to her PCP.  Will continue to follow right now as we're titrating medications.      Constipation - Noted to have increased stool burden on CT scan in November.  Has been working on managing this.  -Recommended decreasing fiber supplements and starting senna in addition to MiraLAX.        Follow up: 3 to 4 months    Video-Visit Details  Video Start Time:  1:50 PM  Video End Time:  2:28 PM    Originating Location (pt. Location): Home     Distant Location (provider location):  [facility]    Platform used for Video Visit: Koduco     Total time spent on day of encounter is 55 mins, including reviewing record, review of above studies, above visit with patient, symptomatic discussion of neuropathic and arthritic pain and constipation, including medication adjustments/prescription management, and documentation.     Karla Millard DO  Palliative Medicine   Pager [phone], AMCOM ID [id number]    Some chart documentation performed using Dragon Voice recognition Software. Although reviewed after completion, some words and grammatical errors may remain.        Again, thank you for allowing me to participate in the care of your patient.        Sincerely,        Karla Millard,     "

## 2022-01-13 NOTE — PATIENT INSTRUCTIONS
"Recommendations:  -OK to take up to 4 tablets of the oxycodone in a day for severe pain.  Try to do 3 tablets if you can, but it would be okay to do the fourth tablet.  -I also placed a referral to Dr. Dobbs, the physical medicine rehabilitation physician who works within the oncology clinic, to see if she can give us some additional insight or recommendations regarding the neuropathy and balance issues.    -We discussed that goal is to have an easy to pass bowel movement at least every 3 days. Based on this, we need to make some adjustments to your bowel medications.  There are 2 different types of laxatives: 1) \"Pushers\" such as senna, which are stimulant laxatives and tell your body and needs to have a bowel movement and 2) \"Mushers\" such as MiraLAX, which is an osmotic laxative bringing water into the colon and making it easier to go to the bathroom.  In general people need a combination of both types.    -For the senna, recommend increasing to 2 tablets twice a day. May increase up to 4 tablets twice a day if needed for regular bowel movement and may need to decrease if your bowels become loose.  -For MiraLAX, can increase to 2-3 doses daily as needed.  You also can do partial doses if an additional full dose is too much (such as one half capful later on in the day).  -If you are struggling with constipation, generally recommend avoiding fiber supplements.    Follow up: 3 months      Reasons to Call    If you are having worsening/uncontrolled symptoms we want you to call!    You or your other physicians make any changes to medications we have prescribed.  -Please call for refills 4-5 days before you will run out of medication.    Important Phone Numbers    [facility] - Astrid Santiago. Palliative Care RN - [phone]    [facility] - Martine Russell. Palliative Care RN - [phone]  *For Refills, scheduling, and general questions - [phone]  *After hours or on weekends. Will connect " you with on call MD. [phone]

## 2022-01-13 NOTE — PROGRESS NOTES
Nathalie Bashir is a 56 year old who is being evaluated via a billable video visit.      How would you like to obtain your AVS? MyChart  If the video visit is dropped, the invitation should be resent by: Text to cell phone: [phone]  Will anyone else be joining your video visit? No      Palliative Care Progress Note    Patient Name: Nathalie Bashir  Primary Provider: Marianne Gonzalez    Chief Complaint/Patient ID: 55 yo F with gastric adenocarcinoma              - Found during Whipple 1/2020 for presumed IPMN - no pancreatic malignancy found, but had poorly differentiated adenoca.  Received neoadjuvant chemo (5FU, leucovorin, oxaliplatin, docetaxel) with curative-intent subtotal gastrectomy, David-en-Y gastrojejunostomy 10/2020.  Required stenting of the jejunum for stenosis causing pain, removed 2/8/21.      Chronic and chemo-induced neuropathy - intol of gabapentin, pregabalin, duloxetine somewhat effective, on TID oxycodone    Last Palliative care appointment: 10/28/21 with me. Increased cymbalta, rec trial of topical aspercream.     Reviewed: Yes, no concerns.    Interim History:  Nathalie Bashir 56 year old female is seen today for follow up with Palliative Care via billable video visit.      Neuropathy continues to be bad in her hands and feet, primarily pins-and-needles sensations. She also has numbness up to her knees. She does think the increase in Cymbalta to 90 mg daily has been helpful. Previously did not tolerate gabapentin or Lyrica and did not like acupuncture for pain in the past. She has used compression stockings on her feet/legs and did not feel it made a difference on her neuropathy.    She also notes that her arthritis pain is getting worse, particularly in her right shoulder and her hands. She has started to notice changes in her knuckles.    Overall she is having increased difficulty tolerating day-to-day tasks. Continues taking oxycodone 5 mg 3 times daily but has days where this does  not cover her the full day and she really struggles.    Also notes some ongoing issues with constipation. There was some findings seen on her recent CT scan of mild to moderate stool burden. She has been using Benefiber and MiraLAX.     Social History:  Lives with her , son, and 3 grandchildren  Retired, worked in special education  Social History     Tobacco Use     Smoking status: Former Smoker     Packs/day: 0.50     Years: 25.00     Pack years: 12.50     Types: Cigarettes     Quit date: 2020     Years since quittin.0     Smokeless tobacco: Never Used   Substance Use Topics     Alcohol use: No     Drug use: No       Family History- Reviewed in Epic.    Allergies   Allergen Reactions     Gluten Meal Palpitations     Augmentin Rash     Patient tolerated Zosyn in 2020     Oxycontin [Oxycodone]      Confusion, loopy, oxycodone is tolerated     Pregabalin      interfered with Cymbalta     Topiramate      Tongue numbness, taste alteration, irritable      Acetaminophen Nausea     Urine retention       Dust Mite Extract      Gabapentin Dizziness and GI Disturbance     Ketorolac Headache     Latex Rash     Methadone Fatigue     Mold      Naprosyn [Naproxen] Dizziness and GI Disturbance     Seasonal Allergies        Medications- Reviewed in Epic.    Past Medical History- Reviewed in [facility].    Past Surgical History- Reviewed in Epic.    Review of Systems:   ROS: 10 point ROS neg other than the symptoms noted above in the HPI.    Physical Exam:   Constitutional: Alert, pleasant, no apparent distress. Sitting up in chair.  Eyes: Sclera non-icteric, no eye discharge.  ENT: No nasal discharge. Ears grossly normal.  Respiratory: Unlabored respirations. Speaking in full sentences.  Musculoskeletal: Extremities appear normal- no gross deformities noted. No edema noted on upper body.   Skin: No suspicious lesions or rashes on visible skin.  Neurologic: Clear speech, no aphasia. No facial droop.  Psychiatric:  "Mentation appears normal, appropriate attention. Affect normal/bright. Does not appear anxious or depressed.      Key Data Reviewed:  LABS: 11/15/2021- Cr 0.91, GFR 71, Albumin 3.3, LFTs WNL. WBC 5.5, Hgb 12.8, Plts 197.     IMAGING: CT CAP 11/15/2021- IMPRESSION: In this patient with history of gastric adenocarcinoma, the current scan compared to prior CT chest abdomen and pelvis 5/21/2021 shows no evidence of recurrent or metastatic disease in the chest, abdomen, and pelvis.    Impression & Recommendations & Counseling:  Nathalie Bashir is a 56 year old female with history of gastric adenocarcinoma found during Whipple 1/2020 for presumed IPMN, no pancreatic malignancy found at that time, now s/p neoadjuvant chemotherapy and curative intent surgery 10/2020.  Is now on surveillance and feeling more back to normal.  Seen for symptom management of peripheral neuropathy that worsened in the setting of chemo.     Pain, neuropathic and arthritic  Balance issues - Has noticed some improvement in neuropathy with increased dose of Cymbalta.  Still having ongoing symptoms that are not fully alleviated by current regimen.  -Continue Cymbalta 90mg daily.   -I have placed a referral to oncology PMNR physician Dr. Dobbs for a consult regarding additional suggestions related to severity of her neuropathy and balance issues.  -Discussed that I would be okay with her taking a fourth dose of oxycodone on days when pain is very severe.  Prescription changed to 5 mg up to 4 times daily as needed.  -She also takes Elavil for sleep. If she notices increased hangover effect, it could be the Cymbalta prolonging metabolism of Elavil and the Elavil may need to be decreased. If that is not sufficient, may need to look into different sleep aid.  - Discussed broadly that she is now in \"survivorship\" with planned surveillance every 6 months after curative-intent.  If we got to a stable point in her care/neuropathy, would recommend transitioning " care back to her PCP.  Will continue to follow right now as we're titrating medications.      Constipation - Noted to have increased stool burden on CT scan in November.  Has been working on managing this.  -Recommended decreasing fiber supplements and starting senna in addition to MiraLAX.        Follow up: 3 to 4 months    Video-Visit Details  Video Start Time:  1:50 PM  Video End Time:  2:28 PM    Originating Location (pt. Location): Home     Distant Location (provider location):  [facility]    Platform used for Video Visit: [email or web address]     Total time spent on day of encounter is 55 mins, including reviewing record, review of above studies, above visit with patient, symptomatic discussion of neuropathic and arthritic pain and constipation, including medication adjustments/prescription management, and documentation.     Karla Millard,   Palliative Medicine   Pager [phone], AMCOM ID [id number]    Some chart documentation performed using Dragon Voice recognition Software. Although reviewed after completion, some words and grammatical errors may remain.

## 2022-01-13 NOTE — LETTER
1/13/2022         RE: Nathalie Bashir  [address] [address]        Dear Colleague,    Thank you for referring your patient, Nathalie Bashir, to the [facility] [place]. Please see a copy of my visit note below.    Nathalie Bashir is a 56 year old who is being evaluated via a billable video visit.      How would you like to obtain your AVS? MyChart  If the video visit is dropped, the invitation should be resent by: Text to cell phone: [phone]  Will anyone else be joining your video visit? No      Palliative Care Progress Note    Patient Name: Nathalie Bashir  Primary Provider: Marianne Gonzalez    Chief Complaint/Patient ID: 57 yo F with gastric adenocarcinoma              - Found during Whipple 1/2020 for presumed IPMN - no pancreatic malignancy found, but had poorly differentiated adenoca.  Received neoadjuvant chemo (5FU, leucovorin, oxaliplatin, docetaxel) with curative-intent subtotal gastrectomy, David-en-Y gastrojejunostomy 10/2020.  Required stenting of the jejunum for stenosis causing pain, removed 2/8/21.      Chronic and chemo-induced neuropathy - intol of gabapentin, pregabalin, duloxetine somewhat effective, on TID oxycodone    Last Palliative care appointment: 10/28/21 with me. Increased cymbalta, rec trial of topical aspercream.     Reviewed: Yes, no concerns.    Interim History:  Nathalie Bashir 56 year old female is seen today for follow up with Palliative Care via billable video visit.      Neuropathy continues to be bad in her hands and feet, primarily pins-and-needles sensations. She also has numbness up to her knees. She does think the increase in Cymbalta to 90 mg daily has been helpful. Previously did not tolerate gabapentin or Lyrica and did not like acupuncture for pain in the past. She has used compression stockings on her feet/legs and did not feel it made a difference on her neuropathy.    She also notes that her arthritis pain is getting  worse, particularly in her right shoulder and her hands. She has started to notice changes in her knuckles.    Overall she is having increased difficulty tolerating day-to-day tasks. Continues taking oxycodone 5 mg 3 times daily but has days where this does not cover her the full day and she really struggles.    Also notes some ongoing issues with constipation. There was some findings seen on her recent CT scan of mild to moderate stool burden. She has been using Benefiber and MiraLAX.     Social History:  Lives with her , son, and 3 grandchildren  Retired, worked in special education  Social History     Tobacco Use     Smoking status: Former Smoker     Packs/day: 0.50     Years: 25.00     Pack years: 12.50     Types: Cigarettes     Quit date: 2020     Years since quittin.0     Smokeless tobacco: Never Used   Substance Use Topics     Alcohol use: No     Drug use: No       Family History- Reviewed in Epic.    Allergies   Allergen Reactions     Gluten Meal Palpitations     Augmentin Rash     Patient tolerated Zosyn in 2020     Oxycontin [Oxycodone]      Confusion, loopy, oxycodone is tolerated     Pregabalin      interfered with Cymbalta     Topiramate      Tongue numbness, taste alteration, irritable      Acetaminophen Nausea     Urine retention       Dust Mite Extract      Gabapentin Dizziness and GI Disturbance     Ketorolac Headache     Latex Rash     Methadone Fatigue     Mold      Naprosyn [Naproxen] Dizziness and GI Disturbance     Seasonal Allergies        Medications- Reviewed in Epic.    Past Medical History- Reviewed in [facility].    Past Surgical History- Reviewed in Epic.    Review of Systems:   ROS: 10 point ROS neg other than the symptoms noted above in the HPI.    Physical Exam:   Constitutional: Alert, pleasant, no apparent distress. Sitting up in chair.  Eyes: Sclera non-icteric, no eye discharge.  ENT: No nasal discharge. Ears grossly normal.  Respiratory: Unlabored respirations.  Speaking in full sentences.  Musculoskeletal: Extremities appear normal- no gross deformities noted. No edema noted on upper body.   Skin: No suspicious lesions or rashes on visible skin.  Neurologic: Clear speech, no aphasia. No facial droop.  Psychiatric: Mentation appears normal, appropriate attention. Affect normal/bright. Does not appear anxious or depressed.      Key Data Reviewed:  LABS: 11/15/2021- Cr 0.91, GFR 71, Albumin 3.3, LFTs WNL. WBC 5.5, Hgb 12.8, Plts 197.     IMAGING: CT CAP 11/15/2021- IMPRESSION: In this patient with history of gastric adenocarcinoma, the current scan compared to prior CT chest abdomen and pelvis 5/21/2021 shows no evidence of recurrent or metastatic disease in the chest, abdomen, and pelvis.    Impression & Recommendations & Counseling:  Nathalie Bashir is a 56 year old female with history of gastric adenocarcinoma found during Whipple 1/2020 for presumed IPMN, no pancreatic malignancy found at that time, now s/p neoadjuvant chemotherapy and curative intent surgery 10/2020.  Is now on surveillance and feeling more back to normal.  Seen for symptom management of peripheral neuropathy that worsened in the setting of chemo.     Pain, neuropathic and arthritic  Balance issues - Has noticed some improvement in neuropathy with increased dose of Cymbalta.  Still having ongoing symptoms that are not fully alleviated by current regimen.  -Continue Cymbalta 90mg daily.   -I have placed a referral to oncology PMNR physician Dr. Dobbs for a consult regarding additional suggestions related to severity of her neuropathy and balance issues.  -Discussed that I would be okay with her taking a fourth dose of oxycodone on days when pain is very severe.  Prescription changed to 5 mg up to 4 times daily as needed.  -She also takes Elavil for sleep. If she notices increased hangover effect, it could be the Cymbalta prolonging metabolism of Elavil and the Elavil may need to be decreased. If that is  "not sufficient, may need to look into different sleep aid.  - Discussed broadly that she is now in \"survivorship\" with planned surveillance every 6 months after curative-intent.  If we got to a stable point in her care/neuropathy, would recommend transitioning care back to her PCP.  Will continue to follow right now as we're titrating medications.      Constipation - Noted to have increased stool burden on CT scan in November.  Has been working on managing this.  -Recommended decreasing fiber supplements and starting senna in addition to MiraLAX.        Follow up: 3 to 4 months    Video-Visit Details  Video Start Time:  1:50 PM  Video End Time:  2:28 PM    Originating Location (pt. Location): Home     Distant Location (provider [facility]    Platform used for Video Visit: Sanswire     Total time spent on day of encounter is 55 mins, including reviewing record, review of above studies, above visit with patient, symptomatic discussion of neuropathic and arthritic pain and constipation, including medication adjustments/prescription management, and documentation.     Karla Millard DO  Palliative Medicine   Pager [phone], AMCOM ID [id number]    Some chart documentation performed using Dragon Voice recognition Software. Although reviewed after completion, some words and grammatical errors may remain.        Again, thank you for allowing me to participate in the care of your patient.        Sincerely,        Karla Millard,     "

## 2022-01-13 NOTE — PROGRESS NOTES
received referral for Dr Dobbs in the PM&R clinic. Scheduling instructions updated and sent to New Patient Scheduling for completion.

## 2022-01-18 ENCOUNTER — LAB (OUTPATIENT)
Dept: LAB | Facility: CLINIC | Age: 57
End: 2022-01-18
Attending: INTERNAL MEDICINE
Payer: MEDICARE

## 2022-01-18 PROCEDURE — 96523 IRRIG DRUG DELIVERY DEVICE: CPT

## 2022-01-18 PROCEDURE — 250N000011 HC RX IP 250 OP 636: Performed by: INTERNAL MEDICINE

## 2022-01-18 RX ORDER — HEPARIN SODIUM (PORCINE) LOCK FLUSH IV SOLN 100 UNIT/ML 100 UNIT/ML
5 SOLUTION INTRAVENOUS ONCE
Status: COMPLETED | OUTPATIENT
Start: 2022-01-18 | End: 2022-01-18

## 2022-01-18 RX ADMIN — Medication 5 ML: at 13:46

## 2022-01-18 NOTE — NURSING NOTE
Chief Complaint   Patient presents with     Port Flush     Port flushed by RN in lab     Port accessed with by 20g gripper needle by RN,  line flushed with saline and heparin.    Bel BALDERAS RN PHN BSN  BMT/Oncology Lab

## 2022-02-03 DIAGNOSIS — G89.3 CHRONIC PAIN DUE TO NEOPLASM: ICD-10-CM

## 2022-02-03 DIAGNOSIS — M79.2 NEUROPATHIC PAIN: ICD-10-CM

## 2022-02-03 RX ORDER — DULOXETIN HYDROCHLORIDE 30 MG/1
90 CAPSULE, DELAYED RELEASE ORAL DAILY
Qty: 90 CAPSULE | Refills: 2 | Status: SHIPPED | OUTPATIENT
Start: 2022-02-03 | End: 2022-05-04

## 2022-02-03 NOTE — TELEPHONE ENCOUNTER
Received telephone call from patient requesting refill of cymbalta.     Last office visit: 1/13/22  Scheduled for follow up 4/14/22     Will route request to MD for review.

## 2022-02-07 ENCOUNTER — MYC REFILL (OUTPATIENT)
Dept: ONCOLOGY | Facility: CLINIC | Age: 57
End: 2022-02-07
Payer: MEDICARE

## 2022-02-07 DIAGNOSIS — G89.3 CHRONIC PAIN DUE TO NEOPLASM: ICD-10-CM

## 2022-02-07 DIAGNOSIS — M79.2 NEUROPATHIC PAIN: ICD-10-CM

## 2022-02-07 RX ORDER — OXYCODONE HYDROCHLORIDE 5 MG/1
5 TABLET ORAL 4 TIMES DAILY PRN
Qty: 120 TABLET | Refills: 0 | Status: SHIPPED | OUTPATIENT
Start: 2022-02-14 | End: 2022-03-08

## 2022-02-07 NOTE — TELEPHONE ENCOUNTER
Received LogMeInt message from patient requesting refill of oxycodone.     Last refill: 1/13/2022  Last office visit: 1/13/2022  Scheduled for follow up 4/14/2022    Will route request to MD for review.     Reviewed [place]  Report.

## 2022-02-07 NOTE — PROGRESS NOTES
1900 - 2330: Pain controlled by pca dilaudid and prn  q6hrs simethacone for gas pain. Q4  no insulin needed.   MIVF infusing 50ml/hr, JAZZMINE patent w/ 50ml adequate UOP.  Up with sba to bathroom.       Continue with poc..   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 SHAILA presented in Oct 2021 with 1 year history of jerks out of sleep and also during the day if eyes are closed. Routine EEG then was normal. He presented to [facility] ER with a cluster of seizures in Nov 2021. VEEG captured seizures. SHAILA was loaded with Keppra and was sent home on Keppra 500 mg b.i.d. SHAILA had a normal Brain MRI. Genetic testing / Epilepsy panel was unremarkable. He was last seen by Dr. Morris on 12/22/2021; here today to see me first visit.\par \par 02/07/2022 first visit with me \par Above history reviewed and confirmed with SHAILA and his mother\par One year history of jerks that happened only when waking up from sleep in the middle of the night to go to the bathroom; it happened while in the bathroom. It used to happen weekly. May be few episodes during the day as well, but mostly at night. One episode at night woke up parents from sleep.\par Mother denies history of staring spells or convulsive seizures. \par SHAILA reports that he is doing well on Keppra 500mg b.i.d. He had no further episodes that he is aware of.  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

## 2022-02-25 ENCOUNTER — MEDICAL CORRESPONDENCE (OUTPATIENT)
Dept: HEALTH INFORMATION MANAGEMENT | Facility: CLINIC | Age: 57
End: 2022-02-25
Payer: MEDICARE

## 2022-03-02 ENCOUNTER — TRANSCRIBE ORDERS (OUTPATIENT)
Dept: OTHER | Age: 57
End: 2022-03-02
Payer: MEDICARE

## 2022-03-02 DIAGNOSIS — N39.0 RECURRENT UTI: Primary | ICD-10-CM

## 2022-03-02 DIAGNOSIS — K59.09 CHRONIC CONSTIPATION: ICD-10-CM

## 2022-03-03 ENCOUNTER — PRE VISIT (OUTPATIENT)
Dept: UROLOGY | Facility: CLINIC | Age: 57
End: 2022-03-03
Payer: MEDICARE

## 2022-03-03 NOTE — TELEPHONE ENCOUNTER
Action 03/03/22 MMT   Action Taken  CSS received incoming referral and records from [facility] for dysuria. Documents sent to scanning. Patient is not yet scheduled.

## 2022-03-03 NOTE — CONFIDENTIAL NOTE
Reason for visit: consult Mg     Relevant information: chronic constipation - BM every 2 days, dysuria, urinary frequency, cloudy urine    Records/imaging/labs/orders: in epic    Pt called: n/a    At Rooming: virtual

## 2022-03-07 ENCOUNTER — VIRTUAL VISIT (OUTPATIENT)
Dept: UROLOGY | Facility: CLINIC | Age: 57
End: 2022-03-07
Attending: NURSE PRACTITIONER
Payer: MEDICARE

## 2022-03-07 DIAGNOSIS — N39.0 RECURRENT UTI: ICD-10-CM

## 2022-03-07 PROCEDURE — 99213 OFFICE O/P EST LOW 20 MIN: CPT | Mod: 95 | Performed by: PHYSICIAN ASSISTANT

## 2022-03-07 NOTE — NURSING NOTE
Chief Complaint   Patient presents with     Consult For     Mg       Last menstrual period 09/23/2014, not currently breastfeeding. There is no height or weight on file to calculate BMI.    Patient Active Problem List   Diagnosis     Lower limb length difference     IPMN (intraductal papillary mucinous neoplasm)     Allergic rhinitis     Arthralgia of hip     Bursitis     Chronic neck pain     Chronic pain disorder     Continuous opioid dependence (H)     Controlled substance agreement signed     Debility     Degeneration of intervertebral disc of lumbosacral region     Depression     Disorder of sacrum     Greater trochanteric bursitis of left hip     Infected prosthesis of left hip (H)     Chronic bilateral low back pain without sciatica     MDD (major depressive disorder), recurrent, in partial remission (H)     Migraine with aura     Sacroiliitis (H)     Screening for malignant neoplasm of cervix     Spondylosis of lumbosacral spine without myelopathy     Status post left hip replacement     Cervical radiculopathy, chronic     Unilateral inguinal hernia without obstruction or gangrene     Long term (current) use of opiate analgesic     Low urine output     Hypophosphatemia     Leukocytosis     Intra-abdominal fluid collection     Bacteremia     Malnutrition (H)     Gastric adenocarcinoma (H)     Pneumonia     Mucositis     Chemotherapy-induced neutropenia (H)     Gastric cancer (H)     Cholangitis     Thrombocytopenia (H)     Urinary tract infection     Crohn's disease of small intestine (H)     Jejunitis     Colitis     Abdominal pain     Malignant neoplasm of overlapping sites of stomach (H)     Iron deficiency anemia     Anemia     Paralytic ileus (H)     Hypokalemia     Nausea & vomiting     Dysphagia     History of gastric surgery       Allergies   Allergen Reactions     Gluten Meal Palpitations     Augmentin Rash     Patient tolerated Zosyn in 7/2020     Oxycontin [Oxycodone]      Confusion, loopy,  oxycodone is tolerated     Pregabalin      interfered with Cymbalta     Topiramate      Tongue numbness, taste alteration, irritable      Acetaminophen Nausea     Urine retention       Dust Mite Extract      Gabapentin Dizziness and GI Disturbance     Ketorolac Headache     Latex Rash     Methadone Fatigue     Mold      Naprosyn [Naproxen] Dizziness and GI Disturbance     Seasonal Allergies        Current Outpatient Medications   Medication Sig Dispense Refill     amitriptyline (ELAVIL) 100 MG tablet Take 1 tablet (100 mg) by mouth At Bedtime 30 tablet 3     amylase-lipase-protease (CREON 24) 42508-60142 units CPEP per EC capsule 2 capsules with meals and 1 capsule with snacks (Patient not taking: Reported on 8/10/2021) 240 capsule 4     calcium carbonate 500 mg, elemental, (OSCAL 500) 1250 (500 Ca) MG TABS tablet Take by mouth 3 times daily       Cyanocobalamin 1000 MCG CAPS Take by mouth daily       cyclobenzaprine (FLEXERIL) 10 MG tablet Take 10 mg by mouth 3 times daily as needed for muscle spasms       DULoxetine (CYMBALTA) 30 MG capsule Take 3 capsules (90 mg) by mouth daily 90 capsule 2     famotidine (PEPCID) 20 MG tablet Take 20 mg by mouth 2 times daily as needed        fluticasone (FLONASE) 50 MCG/ACT nasal spray Spray 1 spray into both nostrils daily       hydrOXYzine (VISTARIL) 25 MG capsule Take 25-50 mg by mouth 4 times daily as needed for itching       ibuprofen (ADVIL/MOTRIN) 600 MG tablet Take 1 tablet (600 mg) by mouth every 6 hours as needed for moderate pain 50 tablet 0     lidocaine (XYLOCAINE) 2 % solution Swish and swallow 15 mLs in mouth every 3 hours as needed for moderate pain ; Max 8 doses/24 hour period. 100 mL 0     loperamide (IMODIUM) 2 MG capsule Take 4 mg by mouth 4 times daily as needed for diarrhea  (Patient not taking: Reported on 8/10/2021)       loratadine-pseudoePHEDrine (CLARITIN-D 24-HOUR)  MG 24 hr tablet Take 1 tablet by mouth daily as needed        magnesium 250 MG  tablet Take 1 tablet by mouth daily (Patient not taking: Reported on 8/10/2021)       metoclopramide (REGLAN) 5 MG tablet Take 5 mg by mouth 2 times daily as needed (Patient not taking: Reported on 8/10/2021)       multivitamin w/minerals (MULTI-VITAMIN) tablet Take 1 tablet by mouth daily       ondansetron (ZOFRAN) 8 MG tablet Take 1 tablet (8 mg) by mouth every 8 hours as needed (Nausea/Vomiting) (Patient not taking: Reported on 8/10/2021) 90 tablet 2     oxyCODONE (ROXICODONE) 5 MG tablet Take 1 tablet (5 mg) by mouth 4 times daily as needed for moderate to severe pain Maximum 4 pills/day. 120 tablet 0     pantoprazole (PROTONIX) 40 MG EC tablet TAKE 1 TABLET BY MOUTH EVERY DAY 90 tablet 1     potassium chloride ER (KLOR-CON M) 20 MEQ CR tablet TAKE ONE TABLET BY MOUTH ONCE DAILY (Patient not taking: Reported on 8/10/2021) 30 tablet 1     prochlorperazine (COMPAZINE) 10 MG tablet Take 1 tablet (10 mg) by mouth every 6 hours as needed for nausea or vomiting (Patient not taking: Reported on 8/10/2021) 60 tablet 3     sennosides (SENOKOT) 8.6 MG tablet 1-2 tablets 1-2 times daily as needed. 120 tablet 1     simethicone 80 MG TABS Take 1 tablet by mouth every 6 hours as needed (bloating, gas, belching) 90 tablet 3     sucralfate (CARAFATE) 1 GM tablet Take 1 tablet (1 g) by mouth 4 times daily as needed (reflux.) (Patient not taking: Reported on 8/10/2021) 360 tablet 6     SUMAtriptan (IMITREX) 100 MG tablet Take 100 mg by mouth at onset of headache for migraine         Social History     Tobacco Use     Smoking status: Former Smoker     Packs/day: 0.50     Years: 25.00     Pack years: 12.50     Types: Cigarettes     Quit date: 2020     Years since quittin.1     Smokeless tobacco: Never Used   Substance Use Topics     Alcohol use: No     Drug use: Livia Oconnor  3/7/2022  1:42 PM

## 2022-03-07 NOTE — PATIENT INSTRUCTIONS
UROLOGY CLINIC VISIT PATIENT INSTRUCTIONS    - Schedule nurse visit for PVR.   - Schedule cystoscopy with Dr. Velez or Dr. Hooks for evaluation of recurrent UTIs.   - Supplements to prevent UTI to consider  ---Probiotics  ---Cranberry (for these products let them know a doctor is recommending them)   [place]: [email or web address]   Ther[employer]ran [employer] by [employer] [facility] [address]  ---Vitamin C 500-1000mg twice a day      If you have any issues, questions or concerns in the meantime, do not hesitate to contact us at [phone] or via [employer]     It was a pleasure meeting with you today.  Thank you for allowing me and my team the privilege of caring for you today.  YOU are the reason we are here, and I truly hope we provided you with the excellent service you deserve.  Please let us know if there is anything else we can do for you so that we can be sure you are leaving completely satisfied with your care experience.    Wendy Oliveira PA-C  Department of Urology

## 2022-03-07 NOTE — PROGRESS NOTES
Chief Complaint:   Recurrent UTI         History of Present Illness:   Nathalie Bashir is a 56 year old female with a history of gastric adenoma diagnosed in 1/2020 s/p chemotherapy who presents for evaluation of recurrent UTIs.  Patient with recent recurrent UTIs over the past year, with two culture proven E. Coli UTIs in 11/14/2021 and 12/27/2021 with [facility].  Patient was also recently treated for presumed recurrent UTI on 2/25/2022 by her PCP during a virtual visit, but UA/UC was not obtained.      Patient reports her symptoms of UTIs include dysuria and bladder pain, urinary urgency and cloudy urine.  She reports symptoms resolve with her antibiotic treatment.  At baseline she does admit to urinary frequency, but denies any urgency or urge incontinence.  She does admit to nocturia 2-3 times per night.  She does report some urinary hesitancy, and a slowed urinary stream.  She also does not feel that she is fully emptying her bladder adequately.  She does struggle with chronic constipation for which she is currently using dulcolax daily, but is continuing to have constipation with a BM every 2-3 days.  She drinks one soda per day, but denies any coffee or tea use, and no alcohol use.  She has never had any pelvic surgeries.  She has one child with a history of vaginal delivery.           Past Medical History:     Past Medical History:   Diagnosis Date     Allergic rhinitis      Cancer (H)     stomach cancer     Chronic pain      Depression      Gastric adenocarcinoma (H)      Lumbago      Migraine      Opioid dependence (H)      Other chronic pain     low back     Sacroiliitis (H)     low back pain     Trochanteric bursitis     leg length discrrepancy, hip pain            Past Surgical History:     Past Surgical History:   Procedure Laterality Date     ARTHROPLASTY HIP Left 2016     BACK SURGERY      L4-5 decompression     CATARACT IOL, RT/LT       CHOLECYSTECTOMY N/A 1/28/2020    Procedure:  Cholecystectomy;  Surgeon: Yandel Mallory MD;  [facility]: [facility]     ENDOSCOPIC RETROGRADE CHOLANGIOPANCREATOGRAM N/A 7/27/2020    Procedure: ENDOSCOPIC RETROGRADE CHOLANGIOPANCREATOGRAPHY  **Latex Allergy** with jejunal biopsies;  Surgeon: Jeet Aviles MD;  Location: [facility] OR     ESOPHAGOSCOPY, GASTROSCOPY, DUODENOSCOPY (EGD), COMBINED N/A 3/3/2020    Procedure: ESOPHAGOGASTRODUODENOSCOPY, WITH ENDOSCOPIC US (enoxaparin);  Surgeon: Bakari Plata MD;  Location: [facility] GI     ESOPHAGOSCOPY, GASTROSCOPY, DUODENOSCOPY (EGD), COMBINED N/A 12/7/2020    Procedure: Upper Endoscopy with stent placement;  Surgeon: Jeet Aviles MD;  Location:  OR     ESOPHAGOSCOPY, GASTROSCOPY, DUODENOSCOPY (EGD), COMBINED N/A 2/8/2021    Procedure: ESOPHAGOGASTRODUODENOSCOPY (EGD)AND STENT REMOVAL;  Surgeon: Jeet Aviles MD;  Location:  OR     EYE SURGERY       GASTRECTOMY N/A 10/21/2020    Procedure: Open subtotal gastectomy with David en Y Reconstruction; D1 Lymph Node Disection  **Latex Allergy**;  Surgeon: Yandel Mallory MD;  Location: [facility] OR     IR CHEST PORT PLACEMENT > 5 YRS OF AGE  3/26/2020     IR LUMBAR EPIDURAL STEROID INJECTION  8/25/2009     IR LUMBAR EPIDURAL STEROID INJECTION  9/2/2009     IR LUMBAR EPIDURAL STEROID INJECTION  9/25/2009     IR LUMBAR EPIDURAL STEROID INJECTION  11/23/2009     IR LUMBAR EPIDURAL STEROID INJECTION  4/1/2010     IR LUMBAR EPIDURAL STEROID INJECTION  9/1/2010     IR LUMBAR EPIDURAL STEROID INJECTION  3/10/2011     IR LUMBAR EPIDURAL STEROID INJECTION  8/30/2011     LAPAROSCOPY DIAGNOSTIC (GENERAL) N/A 10/13/2020    Procedure: Diagnostic laparoscopy with peritoneal washings  **Latex Allergy**;  Surgeon: Yandel Mallory MD;  [facility]     OPEN REDUCTION INTERNAL FIXATION RODDING INTRAMEDULLARY FEMUR Left 12/23/2014    Procedure: OPEN REDUCTION INTERNAL FIXATION RODDING INTRAMEDULLARY FEMUR;  Surgeon: Arnol Santiago MD;  Location:  OR     ORTHOPEDIC SURGERY        PICC DOUBLE LUMEN PLACEMENT Right 02/07/2020    5 fr double lumen 41 cm     KS LAMINEC/FACETECT/FORAMIN,LUMBAR 1 SEG      Description: Laminectomy Decompress, Facetectomy, Foraminotomy Lumbar Seg;  Recorded: 04/12/2012;     REMOVE HARDWARE LOWER EXTREMITY Left 4/7/2015    Procedure: REMOVE HARDWARE LOWER EXTREMITY;  Surgeon: Arnol Santiago MD;  [facility]     REMOVE HARDWARE RODDING INTRAMEDULLARY FEMUR Left 12/17/2015    Procedure: REMOVE HARDWARE RODDING INTRAMEDULLARY FEMUR;  Surgeon: Arnol Santiago MD;  [facility]     RESECT BONE LOWER EXTREMITY Left 12/23/2014    Procedure: RESECT BONE LOWER EXTREMITY;  Surgeon: Arnol Santiago MD;  Location: [facility]     SHORTENING OSTEOPLASTY FEMUR W/ IM NAILING       WHIPPLE PROCEDURE N/A 1/28/2020    Procedure: Open Whipple procedure and Cholecystectomy;  Surgeon: Yandel Mallory MD;  Location: [facility] OR     WHIPPLE PROCEDURE       ZZC FUSION OF SACROILIAC JOINT      Description: Arthrodesis Sacroiliac Joint Left;  Recorded: 10/07/2011;     ZZC REPAIR DETACH RETINA,SCLERAL BUCKLE              Medications     Current Outpatient Medications   Medication     amitriptyline (ELAVIL) 100 MG tablet     amylase-lipase-protease (CREON 24) 37216-31190 units CPEP per EC capsule     calcium carbonate 500 mg, elemental, (OSCAL 500) 1250 (500 Ca) MG TABS tablet     Cyanocobalamin 1000 MCG CAPS     cyclobenzaprine (FLEXERIL) 10 MG tablet     DULoxetine (CYMBALTA) 30 MG capsule     famotidine (PEPCID) 20 MG tablet     fluticasone (FLONASE) 50 MCG/ACT nasal spray     hydrOXYzine (VISTARIL) 25 MG capsule     ibuprofen (ADVIL/MOTRIN) 600 MG tablet     lidocaine (XYLOCAINE) 2 % solution     loperamide (IMODIUM) 2 MG capsule     loratadine-pseudoePHEDrine (CLARITIN-D 24-HOUR)  MG 24 hr tablet     magnesium 250 MG tablet     metoclopramide (REGLAN) 5 MG tablet     multivitamin w/minerals (MULTI-VITAMIN) tablet     ondansetron (ZOFRAN) 8 MG tablet     oxyCODONE (ROXICODONE)  5 MG tablet     pantoprazole (PROTONIX) 40 MG EC tablet     potassium chloride ER (KLOR-CON M) 20 MEQ CR tablet     prochlorperazine (COMPAZINE) 10 MG tablet     sennosides (SENOKOT) 8.6 MG tablet     simethicone 80 MG TABS     sucralfate (CARAFATE) 1 GM tablet     SUMAtriptan (IMITREX) 100 MG tablet     No current facility-administered medications for this visit.            Family History:     Family History   Problem Relation Age of Onset     Heart Failure Mother 77     Dementia Father         frontal lobe     No Known Problems Sister      No Known Problems Brother      Anesthesia Reaction No family hx of      Deep Vein Thrombosis No family hx of             Social History:     Social History     Socioeconomic History     Marital status:      Spouse name: Not on file     Number of children: 1     Years of education: Not on file     Highest education level: Not on file   Occupational History     Occupation: retired   Tobacco Use     Smoking status: Former Smoker     Packs/day: 0.50     Years: 25.00     Pack years: 12.50     Types: Cigarettes     Quit date: 2020     Years since quittin.1     Smokeless tobacco: Never Used   Substance and Sexual Activity     Alcohol use: No     Drug use: No     Sexual activity: Not on file   Other Topics Concern     Parent/sibling w/ CABG, MI or angioplasty before 65F 55M? Not Asked   Social History Narrative     Not on file     Social Determinants of Health     Financial Resource Strain: Not on file   Food Insecurity: Not on file   Transportation Needs: Not on file   Physical Activity: Not on file   Stress: Not on file   Social Connections: Not on file   Intimate Partner Violence: Not on file   Housing Stability: Not on file            Allergies:   Gluten meal, Augmentin, Oxycontin [oxycodone], Pregabalin, Topiramate, Acetaminophen, Dust mite extract, Gabapentin, Ketorolac, Latex, Methadone, Mold, Naprosyn [naproxen], and Seasonal allergies         Review of Systems:   From intake questionnaire   Negative 14 system review except as noted on HPI, nurse's note.         Physical Exam:   Patient is a 56 year old  female    General Appearance Adult: Alert, no acute distress, oriented  Lungs: no respiratory distress, or pursed lip breathing  Heart: No obvious jugular venous distension present  Skin: no suspicious lesions or rashes  Neuro: Alert, oriented, speech and mentation normal  Further examination is deferred due to the nature of our visit.        Labs and Pathology:    I personally reviewed all applicable laboratory data and went over findings with patient  Significant for:    CBC RESULTS:  Recent Labs   Lab Test 11/15/21  1029 05/21/21  1040 03/22/21  1202 12/07/20  1214   WBC 5.5 4.8 10.8 7.9   HGB 12.8 12.2 11.1* 13.1    197 187 211        BMP RESULTS:  Recent Labs   Lab Test 11/15/21  1029 05/21/21  1040 03/22/21  1202 12/07/20  1214 11/20/20  1121    139 137 138 141   POTASSIUM 4.3 4.2 3.3* 3.8 3.5   CHLORIDE 107 107 105 107 109   CO2 30 29 26 25 26   ANIONGAP 6 3 6 6 6   GLC 92 102* 109* 97 101*   BUN 9 12 13 10 5*   CR 0.91 0.93 0.77 0.73 0.71   GFRESTIMATED 71 70 87 >90 >90   GFRESTBLACK  --  81 >90 >90 >90   PETRA 8.7 8.5 8.2* 8.7 8.5       UA RESULTS:   Recent Labs   Lab Test 10/19/20  1027 07/20/20  1554 05/21/20  1259   SG 1.014 1.018 1.015   URINEPH 7.0 7.0 6.0   NITRITE Negative Negative Negative   RBCU <1 5* 2   WBCU 14* 57* 1         Imaging:    I personally reviewed all applicable imaging and went over findings with patient.  Significant for:    Results for orders placed or performed in visit on 11/15/21   CT Chest/Abdomen/Pelvis w Contrast    Narrative    EXAMINATION: CT CHEST/ABDOMEN/PELVIS W CONTRAST, 11/15/2021 11:23 AM    TECHNIQUE:  Helical CT images from the thoracic inlet through the  symphysis pubis were obtained Incidental transitional lumbosacral  anatomy with lumbarization of S1. contrast.     Contrast dose: Isovue 370   96cc    COMPARISON:  CT chest/abdomen/pelvis 5/21/2021; PET/CT 9/2/2020,  3/13/2020    HISTORY: Follow-up gastric cancer    Additional information from the EMR: 56-year-old female with a history  of gastric adenocarcinoma status post Whipple procedure 1/28/2020.  Status post chemotherapy. Status post exploratory laparotomy  10/21/2020 with lysis of adhesions, partial omentectomy, resection of  Gastrojejunal Anastomosis w/ En Bloc Subtotal Gastrectomy and modified  D2 Lymphadenectomy with David-en-Y Gastrojejunostomy Reconstruction.  Status post gastrojejunal stent placement 12/7/2020, removed2/8/2021.    FINDINGS:    Support devices: Right IJ Port-A-Cath tip is in the low SVC.    CHEST:     LOWER NECK: No enlarged supraclavicular nodes are present. No  actionable nodule is present in the imaged portion of the thyroid  lobes.  MEDIASTINUM AND BRYCE: No mediastinal mass is present. No enlarged  lymph nodes are present. Unchanged 3 mm paracardiac nodule (series 3,  image 220)  HEART AND PERICARDIUM: Normal heart size. No pericardial fluid or  thickening.  THORACIC VESSELS: Normal caliber of the aorta and main pulmonary  artery.  AIRWAYS: The central tracheobronchial tree is clear.  LUNGS: No pulmonary consolidations. Stable 5 mm solid nodule in the  left major fissure, likely a sylvia fissural lymph node (series 6 image  146). Unchanged subpleural 2 mm nodule in the right middle lobe  (series 6, image 163). No new or enlarging pulmonary nodule.     No pleural effusion or pneumothorax.    ABDOMEN:    LIVER: No focal hepatic lesions. No intrahepatic biliary ductal  dilatation.  BILIARY: The gallbladder is surgically absent.  SPLEEN: Not enlarged.  PANCREAS: Changes of Whipple procedure. Small volume of gas in the  proximal pancreatic duct, also seen on prior scan.  ADRENALS: No nodules.   KIDNEYS AND URETERS: No solid lesions. No calculi. No  hydroureteronephrosis.  BLADDER: Unremarkable.  REPRODUCTIVE: No uterine or adnexal masses.  BOWEL:  Postsurgical changes of Whipple procedure and David-en-Y  Gastrojejunostomy Reconstruction. No abnormally dilated bowel loop.  Colonic diverticulosis without colonic wall thickening or pericolonic  fat stranding. No pneumatosis, portal venous gas, or pneumoperitoneum.  Mild to moderate colonic stool burden.  APPENDIX: The appendix is unremarkable.  MESENTERY/PERITONEUM: Unremarkable.  LYMPH NODES: Subcentimeter left para-aortic lymph nodes, for example  left para-aortic lymph node measuring 5 mm (series 2, image 339),  unchanged..  VESSELS: Moderate atherosclerosis of the abdominal aorta without  aneurysmal dilation.    BONES: Postsurgical changes of left total hip arthroplasty. Stable  changes of left sacroiliac fusion. No acute or aggressive osseous  abnormalities. Degenerative changes in the shoulders, right greater  than left. Multilevel degenerative changes of the spine. Mild  Dextroconvex curvature of the lower lumbar spine. Unchanged sclerotic  foci in the right iliac bone. (Series 3, image 441). Unchanged lucency  in the sternum (series 5, image 68)  SOFT TISSUES: Unremarkable ventral surgical scars.         Impression    IMPRESSION: In this patient with history of gastric adenocarcinoma,  the current scan compared to prior CT chest abdomen and pelvis  5/21/2021 shows no evidence of recurrent or metastatic disease in the  chest, abdomen, and pelvis.    I have personally reviewed the examination and initial interpretation  and I agree with the findings.    SANJANA VILLEGAS MD         SYSTEM ID:  [id number]              Assessment and Plan:     Assessment: 56 year old female with recurrent UTI, with two culture proven E. Coli infections on 11/14/2021 and 12/27/2021, and recently treated empirically for presumed UTI on 2/25/2022, though no UA/UC obtained.  Given two culture proven infections within the past six months, we will proceed with further work up for recurrent UTIs.  Patient with CT chest abdomen pelvis w  contrast from 11/15/2021 unremarkable with no renal lesions or stones and no hydronephrosis.  We will plan to proceed with cystoscopy for further evaluation.  In the interim, we will also plan for a nurse visit for PVR to rule out retention contributing given her baseline symptoms.  We also discussed initiating cranberry and vitamin C supplement for UTI prevention.  Patient advised to notify me should she develop any signs of recurrent UTI, and UA/UC would be obtained; patient currently asymptomatic at this time.      Plan:  - Schedule nurse visit for PVR.   - Schedule cystoscopy with Dr. Velez or Dr. Hooks for evaluation of recurrent UTIs.   - Supplements to prevent UTI to consider  ---Probiotics  ---Cranberry supplement  ---Vitamin C 500-1000mg twice a day      RAHEEL Kitchen  Department of Urology

## 2022-03-07 NOTE — PROGRESS NOTES
Nathalie is a 56 year old who is being evaluated via a billable video visit.      How would you like to obtain your AVS? MyChart  If the video visit is dropped, the invitation should be resent by: Text to cell phone: [phone]  Will anyone else be joining your video visit? No      Video Start Time: 2:07 PM  Video-Visit Details    Type of service:  Video Visit    Video End Time:2:25 PM    Originating Location (pt. Location): Home    Distant Location (provider location):  [facility]     Platform used for Video Visit: [employer]

## 2022-03-08 ENCOUNTER — MYC REFILL (OUTPATIENT)
Dept: ONCOLOGY | Facility: CLINIC | Age: 57
End: 2022-03-08
Payer: MEDICARE

## 2022-03-08 DIAGNOSIS — M79.2 NEUROPATHIC PAIN: ICD-10-CM

## 2022-03-08 DIAGNOSIS — G89.3 CHRONIC PAIN DUE TO NEOPLASM: ICD-10-CM

## 2022-03-08 RX ORDER — OXYCODONE HYDROCHLORIDE 5 MG/1
5 TABLET ORAL 4 TIMES DAILY PRN
Qty: 120 TABLET | Refills: 0 | Status: SHIPPED | OUTPATIENT
Start: 2022-03-08 | End: 2022-04-05

## 2022-03-08 NOTE — TELEPHONE ENCOUNTER
Received Leceret message from patient requesting refill of oxycodone.     Last refill: 2/14/22  Last office visit: 1/13/22  Scheduled for follow up 4/14/22     Will route request to MD for review.     Reviewed [place]  Report.

## 2022-03-10 ENCOUNTER — TELEPHONE (OUTPATIENT)
Dept: UROLOGY | Facility: CLINIC | Age: 57
End: 2022-03-10
Payer: MEDICARE

## 2022-03-10 NOTE — TELEPHONE ENCOUNTER
KATHYM and callback # for patient to schedule the following:   - Schedule nurse visit for PVR.   - Schedule cystoscopy with Dr. Velez or Dr. Hooks for evaluation of recurrent UTIs.

## 2022-03-15 ENCOUNTER — LAB (OUTPATIENT)
Dept: LAB | Facility: CLINIC | Age: 57
End: 2022-03-15
Attending: INTERNAL MEDICINE
Payer: MEDICARE

## 2022-03-15 VITALS
HEART RATE: 104 BPM | SYSTOLIC BLOOD PRESSURE: 104 MMHG | BODY MASS INDEX: 27 KG/M2 | OXYGEN SATURATION: 99 % | RESPIRATION RATE: 16 BRPM | WEIGHT: 177.6 LBS | DIASTOLIC BLOOD PRESSURE: 63 MMHG | TEMPERATURE: 98.2 F

## 2022-03-15 PROCEDURE — 96523 IRRIG DRUG DELIVERY DEVICE: CPT

## 2022-03-15 PROCEDURE — 250N000011 HC RX IP 250 OP 636: Performed by: INTERNAL MEDICINE

## 2022-03-15 RX ORDER — HEPARIN SODIUM (PORCINE) LOCK FLUSH IV SOLN 100 UNIT/ML 100 UNIT/ML
5 SOLUTION INTRAVENOUS DAILY PRN
Status: DISCONTINUED | OUTPATIENT
Start: 2022-03-15 | End: 2022-03-21 | Stop reason: HOSPADM

## 2022-03-15 RX ADMIN — Medication 5 ML: at 10:17

## 2022-03-15 NOTE — NURSING NOTE
Chief Complaint   Patient presents with     Port Flush     Port accessed using 20 gauge 3/4 in gripper needle by RN in lab. Flushed with saline and heparin     Myranda Barrera RN

## 2022-03-18 ENCOUNTER — OFFICE VISIT (OUTPATIENT)
Dept: UROLOGY | Facility: CLINIC | Age: 57
End: 2022-03-18
Payer: MEDICARE

## 2022-03-18 ENCOUNTER — VIRTUAL VISIT (OUTPATIENT)
Dept: ONCOLOGY | Facility: CLINIC | Age: 57
End: 2022-03-18
Attending: STUDENT IN AN ORGANIZED HEALTH CARE EDUCATION/TRAINING PROGRAM
Payer: MEDICARE

## 2022-03-18 DIAGNOSIS — C16.8 MALIGNANT NEOPLASM OF OVERLAPPING SITES OF STOMACH (H): Primary | ICD-10-CM

## 2022-03-18 DIAGNOSIS — R30.0 DYSURIA: ICD-10-CM

## 2022-03-18 DIAGNOSIS — G62.0 CHEMOTHERAPY-INDUCED NEUROPATHY (H): ICD-10-CM

## 2022-03-18 DIAGNOSIS — T45.1X5A CHEMOTHERAPY-INDUCED NEUROPATHY (H): ICD-10-CM

## 2022-03-18 DIAGNOSIS — N39.0 RECURRENT UTI: Primary | ICD-10-CM

## 2022-03-18 DIAGNOSIS — R26.89 BALANCE PROBLEMS: ICD-10-CM

## 2022-03-18 LAB
ALBUMIN UR-MCNC: NEGATIVE MG/DL
APPEARANCE UR: CLEAR
BILIRUB UR QL STRIP: NEGATIVE
COLOR UR AUTO: YELLOW
GLUCOSE UR STRIP-MCNC: NEGATIVE MG/DL
HGB UR QL STRIP: NEGATIVE
KETONES UR STRIP-MCNC: NEGATIVE MG/DL
LEUKOCYTE ESTERASE UR QL STRIP: ABNORMAL
MUCOUS THREADS #/AREA URNS LPF: PRESENT /LPF
NITRATE UR QL: NEGATIVE
PH UR STRIP: 7 [PH] (ref 5–7)
RBC URINE: 2 /HPF
SP GR UR STRIP: 1.01 (ref 1–1.03)
SQUAMOUS EPITHELIAL: 8 /HPF
UROBILINOGEN UR STRIP-MCNC: NORMAL MG/DL
WBC URINE: 4 /HPF

## 2022-03-18 PROCEDURE — 99211 OFF/OP EST MAY X REQ PHY/QHP: CPT

## 2022-03-18 PROCEDURE — 81001 URINALYSIS AUTO W/SCOPE: CPT | Performed by: PATHOLOGY

## 2022-03-18 PROCEDURE — 99205 OFFICE O/P NEW HI 60 MIN: CPT | Mod: 95 | Performed by: STUDENT IN AN ORGANIZED HEALTH CARE EDUCATION/TRAINING PROGRAM

## 2022-03-18 PROCEDURE — 87086 URINE CULTURE/COLONY COUNT: CPT | Performed by: PHYSICIAN ASSISTANT

## 2022-03-18 RX ORDER — TIZANIDINE HYDROCHLORIDE 4 MG/1
1 CAPSULE, GELATIN COATED ORAL EVERY 8 HOURS
COMMUNITY
End: 2022-06-27

## 2022-03-18 RX ORDER — MINOCYCLINE HYDROCHLORIDE 100 MG/1
1 CAPSULE ORAL 2 TIMES DAILY
COMMUNITY
End: 2022-06-27

## 2022-03-18 RX ORDER — DULOXETIN HYDROCHLORIDE 60 MG/1
CAPSULE, DELAYED RELEASE ORAL
COMMUNITY
End: 2022-04-14

## 2022-03-18 RX ORDER — SULFAMETHOXAZOLE AND TRIMETHOPRIM 400; 80 MG/1; MG/1
1 TABLET ORAL
COMMUNITY
End: 2022-04-28

## 2022-03-18 RX ORDER — CYCLOBENZAPRINE HCL 10 MG
TABLET ORAL
COMMUNITY
End: 2022-07-27

## 2022-03-18 RX ORDER — DICLOFENAC EPOLAMINE 0.01 G/1
SYSTEM TOPICAL
COMMUNITY
End: 2022-06-27

## 2022-03-18 NOTE — PROGRESS NOTES
[facility] Note     Patient Name: Nathalie Bashir : 1965 Medical Record: [id number]     Requesting Physician/clinician: Karla Millard DO           History of Present Illness:     Nathalie Bashir is a 56 year old female with history of gastric adenocarcinoma (diagnosed during whipple in 2020 for presumed IPMN, no pancreatic malignancy found) who [facility] for chronic chemotherapy induced neuropathy.     Oncology History:  -Followed for a pancreatic duct dilatation and pancreatic cyst which was noted in 2018.    -Since May 2019 she started noticing some nausea and vomiting and occasional abdominal discomfort.    -She had an ultrasound in 2019 which was essentially unremarkable.    -She had a repeat endoscopic ultrasound on 10/29/2019 which showed increase in size of the cyst as well as dilation of the main pancreatic duct.  FNA and fluid analysis showed mucinous epithelium.    -She was referred to Dr. 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  Surprisingly there was no pancreatic ductal neoplasm seen but on the resected specimen stomach cancer was noted.  It was poorly differentiated adenocarcinoma with poorly cohesive/signet ring cell type with proximal gastric mucosal and serosal margins being positive.  There was lymphovascular and perineural invasion seen.  22 lymph nodes were sampled and all were benign.  It was a pT4aN0 lesion.  HER-2/christine FISH was not amplified.       -EUS on 3/3/2020 without clear evidence of neoplasm endoscopically. Left gastric lymph node fine-needle aspiration was negative for carcinoma. Biopsy from the gastric jejunal anastomosis as well as lesser curvature of the stomach also did not show any malignancy.  -She had a PET/CT on 3/13/2020 which showed soft tissue streaky density with increased FDG uptake adjacent to the pancreaticojejunostomy which could be neoplastic in origin.   There is mild increased FDG uptake in the gastric remnant.  Focal area of soft tissue thickening with fatty streakiness along medial laparotomy with increased FDG uptake which likely is inflammatory. Increased FDG uptake in thoracic esophagus which could be esophagitis.   -She started on chemotherapy with 5FU, oxaliplatin, and Taxotere on 3/27/20. She was seen on 4/7/20 for evaluation of fevers. She was treated for possible pneumonia with Levaquin.   -She was seen in clinic 4/13/20 for RUQ pain,CT and labs reassuring. She received cycles 2 and 3 of chemotherapy on 4/20/20 and 5/4/20.   -She was admitted from 5/21-5/23/20 with neutropenic aspiration pneumonia. She received cycle 4 on 5/27/20 with Neulasta support.   -PET/CT on 6/3/20 showed mild residual uptake at the site of gastrojejunal anastomosis which likely is physiologic.  There is almost resolution of soft tissue nodule next to the pancreaticoduodenal anastomosis without FDG uptake.  There is focal increased uptake in the left posterior acetabulum which could be artifact.   -Decreased dose of oxaliplatin due to neuropathy/cold sensitivity on 6/11. Decreased 5FU to 85% with cycle #6 due to mucositis/diarrhea/taste changes. Treatment was held 7/9/20 due to diarrhea, dehydration, tachycardia, rash, and weight loss and she got cycle 7 on 7/15. On 7/20 she presented to the ED with RUQ abdominal pain, and was admitted 7/20-7/29 for acute cholangitis    -She received C#8 FLOT on 8/14/2020.   -PET/CT on 9/2/2020 did not show evidence of disease.  Small peritoneal nodule was a stable.  This was most consistent with fat necrosis.   -CT chest abdomen and pelvis on 9/23/2020 also does not show evidence of malignancy recurrence and the peritoneal nodule in the right upper quadrant is stable.  -Before definite surgery she had diagnostic laparoscopy which was negative for evidence of malignancy so on 10/21/2020 she had exploratory laparotomy with extensive lysis of  additions, partial omentectomy, resection of Gastrojejunal Anastomosis w/ En Bloc Subtotal Gastrectomy and modified D2 Lymphadenectomy with David-en-Y Gastrojejunostomy Reconstruction.  Final pathology showed Focal residual poorly differentiated adenocarcinoma (0.4cm) with signet-ring morphology status    post-neoadjuvant chemotherapy and all 9 lymph nodes were benign.  All margins were negative.  It was a pT1bN0 lesion.   -Before definite surgery she had diagnostic laparoscopy which was negative for evidence of malignancy so on 10/21/2020 she had exploratory laparotomy with extensive lysis of additions, partial omentectomy, resection of Gastrojejunal Anastomosis w/ En Bloc Subtotal Gastrectomy and modified D2 Lymphadenectomy with David-en-Y Gastrojejunostomy Reconstruction.  Final pathology showed Focal residual poorly differentiated adenocarcinoma (0.4cm) with signet-ring morphology status    post-neoadjuvant chemotherapy and all 9 lymph nodes were benign.  All margins were negative.  It was a [id number] lesion.   -She was admitted from 11/4/2020-11/6/2020 for nausea vomiting and left upper quadrant pain and CT scan showed fluid collection in the left upper quadrant, likely hematoma.  Upper GI was negative for leak.  No drainage was recommended by IR.  He was discharged on 7 days of levofloxacin and Flagyl.  -11/20/2020.  CT abdomen and pelvis showed postsurgical changes of Whipple and subtotal gastrectomy with David-en-Y reconstruction.  There is resolution of previously demonstrated fluid collection between the stomach and the spleen and decrease in sigmoid thickening.  Small left pleural effusion with atelectasis adjacent to it.   -12/7/2020.  EGD showed healthy-appearing and generous pouch (remaining the stomach) with widely patent end to side gastrojejunostomy, though with the David/enteral limb mildly stenostic and tortuous at its origin. Therefore a covered metal stent was placed from stomach to jejunum across the  "stenotic region, resulting in straightening of the course.   -2/8/2021.  EGD was done and stent was removed.  -5/21/2021.  CT chest abdomen and pelvis does not show evidence of recurrence of malignancy.        Symptoms,  Patient presents for an initial telephone visit today. She complains of peripheral neuropathy symptoms which have been present since August of 2020 and states that they are severe and affect both her hands and feet. She complains of burning, tingling, stabbing sensation and the feeling of \"balls\" on the bottoms of her feet. She feels that these symptoms have gradually progressed proximally both in her upper and lower extremities and are now up to elbow level in UE and knee level in LE. She states that activities of daily living are affected by her UE symptoms. In addition, she has significant balance difficulties due to LE symptoms and numbness. She has not been to physical therapy or tried hand therapy for these symptoms in the past.  She has had an UE EMG done at [facility] in 2017, but no EMG was done after this since her diagnosis.  She currently takes oxycodone prn (about four times daily per history) and Cymbalta with a little relief in her symptoms.       Therapies/HEP,  Has not participated in outpatient therapies.      Functionally,   Remains independent for mobility and ADLs.             Past Medical and Surgical History:     Past Medical History:   Diagnosis Date     Allergic rhinitis      Cancer (H)     stomach cancer     Chronic pain      Depression      Gastric adenocarcinoma (H)      Lumbago      Migraine      Opioid dependence (H)      Other chronic pain     low back     Sacroiliitis (H)     low back pain     Trochanteric bursitis     leg length discrrepancy, hip pain     Past Surgical History:   Procedure Laterality Date     ARTHROPLASTY HIP Left 2016     BACK SURGERY      L4-5 decompression     CATARACT IOL, RT/LT       CHOLECYSTECTOMY N/A 1/28/2020    Procedure: " Cholecystectomy;  Surgeon: Yandel Mallory MD;  Location: [facility] OR     ENDOSCOPIC RETROGRADE CHOLANGIOPANCREATOGRAM N/A 7/27/2020    Procedure: ENDOSCOPIC RETROGRADE CHOLANGIOPANCREATOGRAPHY  **Latex Allergy** with jejunal biopsies;  Surgeon: Jeet Aviles MD;  Location: [facility] OR     ESOPHAGOSCOPY, GASTROSCOPY, DUODENOSCOPY (EGD), COMBINED N/A 3/3/2020    Procedure: ESOPHAGOGASTRODUODENOSCOPY, WITH ENDOSCOPIC US (enoxaparin);  Surgeon: Bakari Plata MD;  Location: [facility] GI     ESOPHAGOSCOPY, GASTROSCOPY, DUODENOSCOPY (EGD), COMBINED N/A 12/7/2020    Procedure: Upper Endoscopy with stent placement;  Surgeon: Jeet Aviles MD;  Location:  OR     ESOPHAGOSCOPY, GASTROSCOPY, DUODENOSCOPY (EGD), COMBINED N/A 2/8/2021    Procedure: ESOPHAGOGASTRODUODENOSCOPY (EGD)AND STENT REMOVAL;  Surgeon: Jeet Aviles MD;  Location:  OR     EYE SURGERY       GASTRECTOMY N/A 10/21/2020    Procedure: Open subtotal gastectomy with David en Y Reconstruction; D1 Lymph Node Disection  **Latex Allergy**;  Surgeon: Yandel Mallory MD;  Location: [facility] OR     IR CHEST PORT PLACEMENT > 5 YRS OF AGE  3/26/2020     IR LUMBAR EPIDURAL STEROID INJECTION  8/25/2009     IR LUMBAR EPIDURAL STEROID INJECTION  9/2/2009     IR LUMBAR EPIDURAL STEROID INJECTION  9/25/2009     IR LUMBAR EPIDURAL STEROID INJECTION  11/23/2009     IR LUMBAR EPIDURAL STEROID INJECTION  4/1/2010     IR LUMBAR EPIDURAL STEROID INJECTION  9/1/2010     IR LUMBAR EPIDURAL STEROID INJECTION  3/10/2011     IR LUMBAR EPIDURAL STEROID INJECTION  8/30/2011     LAPAROSCOPY DIAGNOSTIC (GENERAL) N/A 10/13/2020    Procedure: Diagnostic laparoscopy with peritoneal washings  **Latex Allergy**;  Surgeon: Yandel Mallory MD;  Location: [facility]     OPEN REDUCTION INTERNAL FIXATION RODDING INTRAMEDULLARY FEMUR Left 12/23/2014    Procedure: OPEN REDUCTION INTERNAL FIXATION RODDING INTRAMEDULLARY FEMUR;  Surgeon: Arnol Santiago MD;  Location:  OR     ORTHOPEDIC SURGERY        PICC DOUBLE LUMEN PLACEMENT Right 2020    5 fr double lumen 41 cm     PA LAMINEC/FACETECT/FORAMIN,LUMBAR 1 SEG      Description: Laminectomy Decompress, Facetectomy, Foraminotomy Lumbar Seg;  Recorded: 2012;     REMOVE HARDWARE LOWER EXTREMITY Left 2015    Procedure: REMOVE HARDWARE LOWER EXTREMITY;  Surgeon: Arnol Santiago MD;  Location: [facility]     REMOVE HARDWARE RODDING INTRAMEDULLARY FEMUR Left 2015    Procedure: REMOVE HARDWARE RODDING INTRAMEDULLARY FEMUR;  Surgeon: Arnol Santiago MD;  [facility]     RESECT BONE LOWER EXTREMITY Left 2014    Procedure: RESECT BONE LOWER EXTREMITY;  Surgeon: Arnol Santiago MD;  [facility]     SHORTENING OSTEOPLASTY FEMUR W/ IM NAILING       WHIPPLE PROCEDURE N/A 2020    Procedure: Open Whipple procedure and Cholecystectomy;  Surgeon: Yandel Mallory MD;  Location: [facility] OR     WHIPPLE PROCEDURE       ZZC FUSION OF SACROILIAC JOINT      Description: Arthrodesis Sacroiliac Joint Left;  Recorded: 10/07/2011;     ZZC REPAIR DETACH RETINA,SCLERAL BUCKLE              Social History:     Social History     Tobacco Use     Smoking status: Former Smoker     Packs/day: 0.50     Years: 25.00     Pack years: 12.50     Types: Cigarettes     Quit date: 2020     Years since quittin.2     Smokeless tobacco: Never Used   Substance Use Topics     Alcohol use: No       Living situation: Lives in [place]         Functional history:     Nathalie Bashir is independent with all aspects of her life.         Family History:     Family History   Problem Relation Age of Onset     Heart Failure Mother 77     Dementia Father         frontal lobe     No Known Problems Sister      No Known Problems Brother      Anesthesia Reaction No family hx of      Deep Vein Thrombosis No family hx of             Medications:     Current Outpatient Medications   Medication Sig Dispense Refill     magic mouthwash (ENTER INGREDIENTS IN COMMENTS) suspension  Take 5 mLs by mouth       amitriptyline (ELAVIL) 100 MG tablet Take 1 tablet (100 mg) by mouth At Bedtime 30 tablet 3     amylase-lipase-protease (CREON 24) 77465-75874 units CPEP per EC capsule 2 capsules with meals and 1 capsule with snacks (Patient not taking: Reported on 8/10/2021) 240 capsule 4     calcium carbonate 500 mg, elemental, (OSCAL 500) 1250 (500 Ca) MG TABS tablet Take by mouth 3 times daily       Cyanocobalamin 1000 MCG CAPS Take by mouth daily       cyclobenzaprine (FLEXERIL) 10 MG tablet TAKE 1 TABLET BY MOUTH 1-3 TIMES DAILY       cyclobenzaprine (FLEXERIL) 10 MG tablet Take 10 mg by mouth 3 times daily as needed for muscle spasms       diclofenac (FLECTOR) 1.3 % patch apply 1 patch to affected area twice daily       DULoxetine (CYMBALTA) 30 MG capsule Take 3 capsules (90 mg) by mouth daily 90 capsule 2     DULoxetine (CYMBALTA) 60 MG capsule TAKE ONE AND A HALF CAPSULE BY MOUTH TWICE DAILY       famotidine (PEPCID) 20 MG tablet Take 20 mg by mouth 2 times daily as needed        fluticasone (FLONASE) 50 MCG/ACT nasal spray Spray 1 spray into both nostrils daily       hydrOXYzine (VISTARIL) 25 MG capsule Take 25-50 mg by mouth 4 times daily as needed for itching       ibuprofen (ADVIL/MOTRIN) 600 MG tablet Take 1 tablet (600 mg) by mouth every 6 hours as needed for moderate pain 50 tablet 0     lidocaine (XYLOCAINE) 2 % solution Swish and swallow 15 mLs in mouth every 3 hours as needed for moderate pain ; Max 8 doses/24 hour period. 100 mL 0     loperamide (IMODIUM) 2 MG capsule Take 4 mg by mouth 4 times daily as needed for diarrhea  (Patient not taking: Reported on 8/10/2021)       loratadine-pseudoePHEDrine (CLARITIN-D 24-HOUR)  MG 24 hr tablet Take 1 tablet by mouth daily as needed        magnesium 250 MG tablet Take 1 tablet by mouth daily (Patient not taking: Reported on 8/10/2021)       metoclopramide (REGLAN) 5 MG tablet Take 5 mg by mouth 2 times daily as needed (Patient not taking:  Reported on 8/10/2021)       minocycline (MINOCIN) 100 MG capsule Take 1 capsule by mouth 2 times daily       multivitamin w/minerals (MULTI-VITAMIN) tablet Take 1 tablet by mouth daily       ondansetron (ZOFRAN) 8 MG tablet Take 1 tablet (8 mg) by mouth every 8 hours as needed (Nausea/Vomiting) (Patient not taking: Reported on 8/10/2021) 90 tablet 2     oxyCODONE (ROXICODONE) 5 MG tablet Take 1 tablet (5 mg) by mouth 4 times daily as needed for moderate to severe pain Maximum 4 pills/day. 120 tablet 0     pantoprazole (PROTONIX) 40 MG EC tablet TAKE 1 TABLET BY MOUTH EVERY DAY 90 tablet 1     potassium chloride ER (KLOR-CON M) 20 MEQ CR tablet TAKE ONE TABLET BY MOUTH ONCE DAILY (Patient not taking: Reported on 8/10/2021) 30 tablet 1     prochlorperazine (COMPAZINE) 10 MG tablet Take 1 tablet (10 mg) by mouth every 6 hours as needed for nausea or vomiting (Patient not taking: Reported on 8/10/2021) 60 tablet 3     sennosides (SENOKOT) 8.6 MG tablet 1-2 tablets 1-2 times daily as needed. 120 tablet 1     simethicone 80 MG TABS Take 1 tablet by mouth every 6 hours as needed (bloating, gas, belching) 90 tablet 3     sucralfate (CARAFATE) 1 GM tablet Take 1 tablet (1 g) by mouth 4 times daily as needed (reflux.) (Patient not taking: Reported on 8/10/2021) 360 tablet 6     sulfamethoxazole-trimethoprim (BACTRIM) 400-80 MG tablet Take 1 tablet by mouth       SUMAtriptan (IMITREX) 100 MG tablet Take 100 mg by mouth at onset of headache for migraine       tiZANidine (ZANAFLEX) 4 MG capsule Take 1 capsule by mouth every 8 hours              Allergies:     Allergies   Allergen Reactions     Gluten Meal Palpitations     Augmentin Rash     Patient tolerated Zosyn in 7/2020     Oxycontin [Oxycodone]      Confusion, loopy, oxycodone is tolerated     Pregabalin      interfered with Cymbalta     Topiramate      Tongue numbness, taste alteration, irritable      Acetaminophen Nausea     Urine retention       Dust Mite Extract       "Gabapentin Dizziness and GI Disturbance     Ketorolac Headache     Latex Rash     Methadone Fatigue     Mold      Naprosyn [Naproxen] Dizziness and GI Disturbance     Seasonal Allergies               ROS:     A focused ROS is negative other than the symptoms noted above in the HPI.           Physical Examiniation:     VITAL SIGNS: LMP 09/23/2014   BMI: Estimated body mass index is 27 kg/m  as calculated from the following:    Height as of 11/27/21: 1.727 m (5' 8\").    Weight as of 3/15/22: 80.6 kg (177 lb 9.6 oz).    Physical Exam was unable to be conducted as this was a telephone visit.          Laboratory/Imaging:     CT Chest/Abdomen/Pelvis W Contrast (11/15/21):  IMPRESSION: In this patient with history of gastric adenocarcinoma,  the current scan compared to prior CT chest abdomen and pelvis  5/21/2021 shows no evidence of recurrent or metastatic disease in the  chest, abdomen, and pelvis.           Assessment/Plan:   Nathalie Bashir is a 56 year old female with history of gastric adenocarcinoma (diagnosed during whipple in 1/2020 for presumed IPMN, no pancreatic malignancy found) who [facility] for chronic chemotherapy induced neuropathy. As discussed with Nathalie, she should stay on her current medication regimen, especially in the setting of not tolerating gabapentin or lyrica in the past. I do not have any more good options to adjust or add medications to help with her neuropathic symptoms. We discussed adding acupuncture or topicals, however she said she has tried this in the past and it made her symptoms worse. In terms of balance/gait difficulties and difficulties with ADLs due to her hand symptoms, recommendation is to start outpatient PT and hand therapy to help with this, and orders were placed today. I feel she would benefit from an EMG to further evaluate extent of her neuropathy and help delineate severity as well. Patient is in agreement with this, so order was placed " today.       1. Patient education: In depth discussion and education was provided about the assessment and implications of each of the below recommendations for management. Patient indicated readiness to learn, all questions were answered and understanding of material presented was confirmed.  2. Work-up:  1. EMG ordered to further evaluate extent/severity of chemotherapy induced neuropathy.   3. Therapy/equipment/braces:  1. Outpatient PT referral placed for balance difficulties.  2. Outpatient hand therapy referral placed for hand neuropathy symptoms and resulting difficulties with fine motor tasks.  4. Medications:  1. Continue Cymbalta and oxycodone prn per Palliative Care team.  5. Follow up: 3 months virtual visit.    Apurva Dobbs MD  Physical Medicine & Rehabilitation    I appreciate the opportunity to participate in the care of your patient.     60 minutes spent on the date of the encounter doing chart review, history and exam, documentation and further activities as noted above.

## 2022-03-18 NOTE — PROGRESS NOTES
Nathalie is a 56 year old who is being evaluated via a billable telephone visit.      Pt has Neuropathy and pain from feet to knees and hands to elbows.    How would you like to obtain your AVS? MyChart  If the video visit is dropped, the invitation should be resent by: Text to cell phone: [phone]  Will anyone else be joining your telephone visit? Livia PECK    Call time: 43 minutes    Distant Location (provider location):  [facility]

## 2022-03-18 NOTE — PATIENT INSTRUCTIONS
1. An order for physical therapy and hand therapy were placed to help with your balance difficulties an difficulties with daily tasks.  2. Continue cymbalta and oxycodone according to your Palliative team instructions.  3. An EMG was ordered to further evaluate the extent of your neuropathy.  4. Return for a virtual visit with Dr. Dobbs in 3 months.

## 2022-03-18 NOTE — NURSING NOTE
Nathalie Bashir comes into clinic today at the request of Wendy Oliveira PA-C Ordering Provider for [facility].      Patient voided 160 mL into urine hat.  PVR 20    Patient states she has been having dysuria and would like to have a UA/UC done today to rule out UTI.  Urine sample collected and sent to lab for analysis.      This service provided today was under the supervising provider of the day Dr. Jose Larsen, who was available if needed.    Maida Chau, CMA

## 2022-03-18 NOTE — LETTER
3/18/2022     RE: Nathalie Bashir  [address] [address]    Dear Colleague,    Thank you for referring your patient, Nathalie Bashir, to the [facility]. Please see a copy of my visit note below.    Nathalie is a 56 year old who is being evaluated via a billable telephone visit.      Pt has Neuropathy and pain from feet to knees and hands to elbows.    How would you like to obtain your AVS? MyChart  If the video visit is dropped, the invitation should be resent by: Text to cell phone: [phone]  Will anyone else be joining your telephone visit? No      Ivanna PECK    Call time: 43 minutes    Distant Location (provider location):  [facility] [place]                PM&R Clinic Note     Patient Name: Nathalie Bashir : 1965 Medical Record: [id number]     Requesting Physician/clinician: Karla Millard DO           History of Present Illness:     Nathalie Bashir is a 56 year old female with history of gastric adenocarcinoma (diagnosed during whipple in 2020 for presumed IPMN, no pancreatic malignancy found) who presents to [facility] for chronic chemotherapy induced neuropathy.     Oncology History:  -Followed for a pancreatic duct dilatation and pancreatic cyst which was noted in 2018.    -Since May 2019 she started noticing some nausea and vomiting and occasional abdominal discomfort.    -She had an ultrasound in 2019 which was essentially unremarkable.    -She had a repeat endoscopic ultrasound on 10/29/2019 which showed increase in size of the cyst as well as dilation of the main pancreatic duct.  FNA and fluid analysis showed mucinous epithelium.    -She was referred to Dr. 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  Surprisingly there was no pancreatic ductal neoplasm seen but on the resected specimen stomach cancer was noted.  It was poorly  differentiated adenocarcinoma with poorly cohesive/signet ring cell type with proximal gastric mucosal and serosal margins being positive.  There was lymphovascular and perineural invasion seen.  22 lymph nodes were sampled and all were benign.  It was a pT4aN0 lesion.  HER-2/christine FISH was not amplified.       -EUS on 3/3/2020 without clear evidence of neoplasm endoscopically. Left gastric lymph node fine-needle aspiration was negative for carcinoma. Biopsy from the gastric jejunal anastomosis as well as lesser curvature of the stomach also did not show any malignancy.  -She had a PET/CT on 3/13/2020 which showed soft tissue streaky density with increased FDG uptake adjacent to the pancreaticojejunostomy which could be neoplastic in origin.  There is mild increased FDG uptake in the gastric remnant.  Focal area of soft tissue thickening with fatty streakiness along medial laparotomy with increased FDG uptake which likely is inflammatory. Increased FDG uptake in thoracic esophagus which could be esophagitis.   -She started on chemotherapy with 5FU, oxaliplatin, and Taxotere on 3/27/20. She was seen on 4/7/20 for evaluation of fevers. She was treated for possible pneumonia with Levaquin.   -She was seen in clinic 4/13/20 for RUQ pain,CT and labs reassuring. She received cycles 2 and 3 of chemotherapy on 4/20/20 and 5/4/20.   -She was admitted from 5/21-5/23/20 with neutropenic aspiration pneumonia. She received cycle 4 on 5/27/20 with Neulasta support.   -PET/CT on 6/3/20 showed mild residual uptake at the site of gastrojejunal anastomosis which likely is physiologic.  There is almost resolution of soft tissue nodule next to the pancreaticoduodenal anastomosis without FDG uptake.  There is focal increased uptake in the left posterior acetabulum which could be artifact.   -Decreased dose of oxaliplatin due to neuropathy/cold sensitivity on 6/11. Decreased 5FU to 85% with cycle #6 due to mucositis/diarrhea/taste changes.  Treatment was held 7/9/20 due to diarrhea, dehydration, tachycardia, rash, and weight loss and she got cycle 7 on 7/15. On 7/20 she presented to the ED with RUQ abdominal pain, and was admitted 7/20-7/29 for acute cholangitis    -She received C#8 FLOT on 8/14/2020.   -PET/CT on 9/2/2020 did not show evidence of disease.  Small peritoneal nodule was a stable.  This was most consistent with fat necrosis.   -CT chest abdomen and pelvis on 9/23/2020 also does not show evidence of malignancy recurrence and the peritoneal nodule in the right upper quadrant is stable.  -Before definite surgery she had diagnostic laparoscopy which was negative for evidence of malignancy so on 10/21/2020 she had exploratory laparotomy with extensive lysis of additions, partial omentectomy, resection of Gastrojejunal Anastomosis w/ En Bloc Subtotal Gastrectomy and modified D2 Lymphadenectomy with David-en-Y Gastrojejunostomy Reconstruction.  Final pathology showed Focal residual poorly differentiated adenocarcinoma (0.4cm) with signet-ring morphology status    post-neoadjuvant chemotherapy and all 9 lymph nodes were benign.  All margins were negative.  It was a pT1bN0 lesion.   -Before definite surgery she had diagnostic laparoscopy which was negative for evidence of malignancy so on 10/21/2020 she had exploratory laparotomy with extensive lysis of additions, partial omentectomy, resection of Gastrojejunal Anastomosis w/ En Bloc Subtotal Gastrectomy and modified D2 Lymphadenectomy with David-en-Y Gastrojejunostomy Reconstruction.  Final pathology showed Focal residual poorly differentiated adenocarcinoma (0.4cm) with signet-ring morphology status    post-neoadjuvant chemotherapy and all 9 lymph nodes were benign.  All margins were negative.  It was a cpO2aL7 lesion.   -She was admitted from 11/4/2020-11/6/2020 for nausea vomiting and left upper quadrant pain and CT scan showed fluid collection in the left upper quadrant, likely hematoma.  Upper  "GI was negative for leak.  No drainage was recommended by IR.  He was discharged on 7 days of levofloxacin and Flagyl.  -11/20/2020.  CT abdomen and pelvis showed postsurgical changes of Whipple and subtotal gastrectomy with David-en-Y reconstruction.  There is resolution of previously demonstrated fluid collection between the stomach and the spleen and decrease in sigmoid thickening.  Small left pleural effusion with atelectasis adjacent to it.   -12/7/2020.  EGD showed healthy-appearing and generous pouch (remaining the stomach) with widely patent end to side gastrojejunostomy, though with the David/enteral limb mildly stenostic and tortuous at its origin. Therefore a covered metal stent was placed from stomach to jejunum across the stenotic region, resulting in straightening of the course.   -2/8/2021.  EGD was done and stent was removed.  -5/21/2021.  CT chest abdomen and pelvis does not show evidence of recurrence of malignancy.        Symptoms,  Patient presents for an initial telephone visit today. She complains of peripheral neuropathy symptoms which have been present since August of 2020 and states that they are severe and affect both her hands and feet. She complains of burning, tingling, stabbing sensation and the feeling of \"balls\" on the bottoms of her feet. She feels that these symptoms have gradually progressed proximally both in her upper and lower extremities and are now up to elbow level in UE and knee level in LE. She states that activities of daily living are affected by her UE symptoms. In addition, she has significant balance difficulties due to LE symptoms and numbness. She has not been to physical therapy or tried hand therapy for these symptoms in the past.  She has had an UE EMG done at [facility] in 2017, but no EMG was done after this since her diagnosis.  She currently takes oxycodone prn (about four times daily per history) and Cymbalta with a little relief in her symptoms. "       Therapies/HEP,  Has not participated in outpatient therapies.      Functionally,   Remains independent for mobility and ADLs.             Past Medical and Surgical History:     Past Medical History:   Diagnosis Date     Allergic rhinitis      Cancer (H)     stomach cancer     Chronic pain      Depression      Gastric adenocarcinoma (H)      Lumbago      Migraine      Opioid dependence (H)      Other chronic pain     low back     Sacroiliitis (H)     low back pain     Trochanteric bursitis     leg length discrrepancy, hip pain     Past Surgical History:   Procedure Laterality Date     ARTHROPLASTY HIP Left 2016     BACK SURGERY      L4-5 decompression     CATARACT IOL, RT/LT       CHOLECYSTECTOMY N/A 1/28/2020    Procedure: Cholecystectomy;  Surgeon: Yandel Mallory MD;  Location:  OR     ENDOSCOPIC RETROGRADE CHOLANGIOPANCREATOGRAM N/A 7/27/2020    Procedure: ENDOSCOPIC RETROGRADE CHOLANGIOPANCREATOGRAPHY  **Latex Allergy** with jejunal biopsies;  Surgeon: Jeet Aviles MD;  Location:  OR     ESOPHAGOSCOPY, GASTROSCOPY, DUODENOSCOPY (EGD), COMBINED N/A 3/3/2020    Procedure: ESOPHAGOGASTRODUODENOSCOPY, WITH ENDOSCOPIC US (enoxaparin);  Surgeon: Bakari Plata MD;  Location:  GI     ESOPHAGOSCOPY, GASTROSCOPY, DUODENOSCOPY (EGD), COMBINED N/A 12/7/2020    Procedure: Upper Endoscopy with stent placement;  Surgeon: Jeet Aviles MD;  Location:  OR     ESOPHAGOSCOPY, GASTROSCOPY, DUODENOSCOPY (EGD), COMBINED N/A 2/8/2021    Procedure: ESOPHAGOGASTRODUODENOSCOPY (EGD)AND STENT REMOVAL;  Surgeon: Jeet Aviles MD;  Location:  OR     EYE SURGERY       GASTRECTOMY N/A 10/21/2020    Procedure: Open subtotal gastectomy with David en Y Reconstruction; D1 Lymph Node Disection  **Latex Allergy**;  Surgeon: Yandel Mallory MD;  Location: [facility] OR     IR CHEST PORT PLACEMENT > 5 YRS OF AGE  3/26/2020     IR LUMBAR EPIDURAL STEROID INJECTION  8/25/2009     IR LUMBAR EPIDURAL STEROID  INJECTION  9/2/2009     IR LUMBAR EPIDURAL STEROID INJECTION  9/25/2009     IR LUMBAR EPIDURAL STEROID INJECTION  11/23/2009     IR LUMBAR EPIDURAL STEROID INJECTION  4/1/2010     IR LUMBAR EPIDURAL STEROID INJECTION  9/1/2010     IR LUMBAR EPIDURAL STEROID INJECTION  3/10/2011     IR LUMBAR EPIDURAL STEROID INJECTION  8/30/2011     LAPAROSCOPY DIAGNOSTIC (GENERAL) N/A 10/13/2020    Procedure: Diagnostic laparoscopy with peritoneal washings  **Latex Allergy**;  Surgeon: Yandel Mallory MD;  Location: [facility] OR     OPEN REDUCTION INTERNAL FIXATION RODDING INTRAMEDULLARY FEMUR Left 12/23/2014    Procedure: OPEN REDUCTION INTERNAL FIXATION RODDING INTRAMEDULLARY FEMUR;  Surgeon: Arnol Santiago MD;  Location: [facility] OR     ORTHOPEDIC SURGERY       PICC DOUBLE LUMEN PLACEMENT Right 02/07/2020    5 fr double lumen 41 cm     GA LAMINEC/FACETECT/FORAMIN,LUMBAR 1 SEG      Description: Laminectomy Decompress, Facetectomy, Foraminotomy Lumbar Seg;  Recorded: 04/12/2012;     REMOVE HARDWARE LOWER EXTREMITY Left 4/7/2015    Procedure: REMOVE HARDWARE LOWER EXTREMITY;  Surgeon: Arnol Santiago MD;  [facility] OR     REMOVE HARDWARE RODDING INTRAMEDULLARY FEMUR Left 12/17/2015    Procedure: REMOVE HARDWARE RODDING INTRAMEDULLARY FEMUR;  Surgeon: Arnol Santiago MD;  [facility]     RESECT BONE LOWER EXTREMITY Left 12/23/2014    Procedure: RESECT BONE LOWER EXTREMITY;  Surgeon: Arnol Santiago MD;  [facility]     SHORTENING OSTEOPLASTY FEMUR W/ IM NAILING       WHIPPLE PROCEDURE N/A 1/28/2020    Procedure: Open Whipple procedure and Cholecystectomy;  Surgeon: Yandel Mallory MD;  Location: [facility] OR     WHIPPLE PROCEDURE       ZZC FUSION OF SACROILIAC JOINT      Description: Arthrodesis Sacroiliac Joint Left;  Recorded: 10/07/2011;     ZZC REPAIR DETACH RETINA,SCLERAL BUCKLE              Social History:     Social History     Tobacco Use     Smoking status: Former Smoker     Packs/day: 0.50     Years: 25.00     Pack  years: 12.50     Types: Cigarettes     Quit date: 2020     Years since quittin.2     Smokeless tobacco: Never Used   Substance Use Topics     Alcohol use: No       Living situation: Lives in [place]         Functional history:     Nathalie Bashir is independent with all aspects of her life.         Family History:     Family History   Problem Relation Age of Onset     Heart Failure Mother 77     Dementia Father         frontal lobe     No Known Problems Sister      No Known Problems Brother      Anesthesia Reaction No family hx of      Deep Vein Thrombosis No family hx of             Medications:     Current Outpatient Medications   Medication Sig Dispense Refill     magic mouthwash (ENTER INGREDIENTS IN COMMENTS) suspension Take 5 mLs by mouth       amitriptyline (ELAVIL) 100 MG tablet Take 1 tablet (100 mg) by mouth At Bedtime 30 tablet 3     amylase-lipase-protease (CREON 24) 21459-78894 units CPEP per EC capsule 2 capsules with meals and 1 capsule with snacks (Patient not taking: Reported on 8/10/2021) 240 capsule 4     calcium carbonate 500 mg, elemental, (OSCAL 500) 1250 (500 Ca) MG TABS tablet Take by mouth 3 times daily       Cyanocobalamin 1000 MCG CAPS Take by mouth daily       cyclobenzaprine (FLEXERIL) 10 MG tablet TAKE 1 TABLET BY MOUTH 1-3 TIMES DAILY       cyclobenzaprine (FLEXERIL) 10 MG tablet Take 10 mg by mouth 3 times daily as needed for muscle spasms       diclofenac (FLECTOR) 1.3 % patch apply 1 patch to affected area twice daily       DULoxetine (CYMBALTA) 30 MG capsule Take 3 capsules (90 mg) by mouth daily 90 capsule 2     DULoxetine (CYMBALTA) 60 MG capsule TAKE ONE AND A HALF CAPSULE BY MOUTH TWICE DAILY       famotidine (PEPCID) 20 MG tablet Take 20 mg by mouth 2 times daily as needed        fluticasone (FLONASE) 50 MCG/ACT nasal spray Spray 1 spray into both nostrils daily       hydrOXYzine (VISTARIL) 25 MG capsule Take 25-50 mg by mouth 4 times daily as needed for itching        ibuprofen (ADVIL/MOTRIN) 600 MG tablet Take 1 tablet (600 mg) by mouth every 6 hours as needed for moderate pain 50 tablet 0     lidocaine (XYLOCAINE) 2 % solution Swish and swallow 15 mLs in mouth every 3 hours as needed for moderate pain ; Max 8 doses/24 hour period. 100 mL 0     loperamide (IMODIUM) 2 MG capsule Take 4 mg by mouth 4 times daily as needed for diarrhea  (Patient not taking: Reported on 8/10/2021)       loratadine-pseudoePHEDrine (CLARITIN-D 24-HOUR)  MG 24 hr tablet Take 1 tablet by mouth daily as needed        magnesium 250 MG tablet Take 1 tablet by mouth daily (Patient not taking: Reported on 8/10/2021)       metoclopramide (REGLAN) 5 MG tablet Take 5 mg by mouth 2 times daily as needed (Patient not taking: Reported on 8/10/2021)       minocycline (MINOCIN) 100 MG capsule Take 1 capsule by mouth 2 times daily       multivitamin w/minerals (MULTI-VITAMIN) tablet Take 1 tablet by mouth daily       ondansetron (ZOFRAN) 8 MG tablet Take 1 tablet (8 mg) by mouth every 8 hours as needed (Nausea/Vomiting) (Patient not taking: Reported on 8/10/2021) 90 tablet 2     oxyCODONE (ROXICODONE) 5 MG tablet Take 1 tablet (5 mg) by mouth 4 times daily as needed for moderate to severe pain Maximum 4 pills/day. 120 tablet 0     pantoprazole (PROTONIX) 40 MG EC tablet TAKE 1 TABLET BY MOUTH EVERY DAY 90 tablet 1     potassium chloride ER (KLOR-CON M) 20 MEQ CR tablet TAKE ONE TABLET BY MOUTH ONCE DAILY (Patient not taking: Reported on 8/10/2021) 30 tablet 1     prochlorperazine (COMPAZINE) 10 MG tablet Take 1 tablet (10 mg) by mouth every 6 hours as needed for nausea or vomiting (Patient not taking: Reported on 8/10/2021) 60 tablet 3     sennosides (SENOKOT) 8.6 MG tablet 1-2 tablets 1-2 times daily as needed. 120 tablet 1     simethicone 80 MG TABS Take 1 tablet by mouth every 6 hours as needed (bloating, gas, belching) 90 tablet 3     sucralfate (CARAFATE) 1 GM tablet Take 1 tablet (1 g) by mouth 4  "times daily as needed (reflux.) (Patient not taking: Reported on 8/10/2021) 360 tablet 6     sulfamethoxazole-trimethoprim (BACTRIM) 400-80 MG tablet Take 1 tablet by mouth       SUMAtriptan (IMITREX) 100 MG tablet Take 100 mg by mouth at onset of headache for migraine       tiZANidine (ZANAFLEX) 4 MG capsule Take 1 capsule by mouth every 8 hours              Allergies:     Allergies   Allergen Reactions     Gluten Meal Palpitations     Augmentin Rash     Patient tolerated Zosyn in 7/2020     Oxycontin [Oxycodone]      Confusion, loopy, oxycodone is tolerated     Pregabalin      interfered with Cymbalta     Topiramate      Tongue numbness, taste alteration, irritable      Acetaminophen Nausea     Urine retention       Dust Mite Extract      Gabapentin Dizziness and GI Disturbance     Ketorolac Headache     Latex Rash     Methadone Fatigue     Mold      Naprosyn [Naproxen] Dizziness and GI Disturbance     Seasonal Allergies               ROS:     A focused ROS is negative other than the symptoms noted above in the HPI.           Physical Examiniation:     VITAL SIGNS: LMP 09/23/2014   BMI: Estimated body mass index is 27 kg/m  as calculated from the following:    Height as of 11/27/21: 1.727 m (5' 8\").    Weight as of 3/15/22: 80.6 kg (177 lb 9.6 oz).    Physical Exam was unable to be conducted as this was a telephone visit.          Laboratory/Imaging:     CT Chest/Abdomen/Pelvis W Contrast (11/15/21):  IMPRESSION: In this patient with history of gastric adenocarcinoma,  the current scan compared to prior CT chest abdomen and pelvis  5/21/2021 shows no evidence of recurrent or metastatic disease in the  chest, abdomen, and pelvis.           Assessment/Plan:   Nathalie Bashir is a 56 year old female with history of gastric adenocarcinoma (diagnosed during whipple in 1/2020 for presumed IPMN, no pancreatic malignancy found) who presents to [facility] for chronic chemotherapy induced neuropathy. As " discussed with Nathalie, she should stay on her current medication regimen, especially in the setting of not tolerating gabapentin or lyrica in the past. I do not have any more good options to adjust or add medications to help with her neuropathic symptoms. We discussed adding acupuncture or topicals, however she said she has tried this in the past and it made her symptoms worse. In terms of balance/gait difficulties and difficulties with ADLs due to her hand symptoms, recommendation is to start outpatient PT and hand therapy to help with this, and orders were placed today. I feel she would benefit from an EMG to further evaluate extent of her neuropathy and help delineate severity as well. Patient is in agreement with this, so order was placed today.       1. Patient education: In depth discussion and education was provided about the assessment and implications of each of the below recommendations for management. Patient indicated readiness to learn, all questions were answered and understanding of material presented was confirmed.  2. Work-up:  1. EMG ordered to further evaluate extent/severity of chemotherapy induced neuropathy.   3. Therapy/equipment/braces:  1. Outpatient PT referral placed for balance difficulties.  2. Outpatient hand therapy referral placed for hand neuropathy symptoms and resulting difficulties with fine motor tasks.  4. Medications:  1. Continue Cymbalta and oxycodone prn per Palliative Care team.  5. Follow up: 3 months virtual visit.    Apurva Dobbs MD  Physical Medicine & Rehabilitation    I appreciate the opportunity to participate in the care of your patient.     60 minutes spent on the date of the encounter doing chart review, history and exam, documentation and further activities as noted above.

## 2022-03-19 LAB — BACTERIA UR CULT: NORMAL

## 2022-04-04 ENCOUNTER — THERAPY VISIT (OUTPATIENT)
Dept: OCCUPATIONAL THERAPY | Facility: CLINIC | Age: 57
End: 2022-04-04
Attending: STUDENT IN AN ORGANIZED HEALTH CARE EDUCATION/TRAINING PROGRAM
Payer: MEDICARE

## 2022-04-04 DIAGNOSIS — T45.1X5A CHEMOTHERAPY-INDUCED NEUROPATHY (H): ICD-10-CM

## 2022-04-04 DIAGNOSIS — M79.641 BILATERAL HAND PAIN: Primary | ICD-10-CM

## 2022-04-04 DIAGNOSIS — G62.0 CHEMOTHERAPY-INDUCED NEUROPATHY (H): ICD-10-CM

## 2022-04-04 DIAGNOSIS — M79.642 BILATERAL HAND PAIN: Primary | ICD-10-CM

## 2022-04-04 DIAGNOSIS — R26.89 BALANCE PROBLEMS: ICD-10-CM

## 2022-04-04 DIAGNOSIS — C16.8 MALIGNANT NEOPLASM OF OVERLAPPING SITES OF STOMACH (H): ICD-10-CM

## 2022-04-04 PROCEDURE — 97166 OT EVAL MOD COMPLEX 45 MIN: CPT | Mod: GO | Performed by: OCCUPATIONAL THERAPIST

## 2022-04-04 PROCEDURE — 97110 THERAPEUTIC EXERCISES: CPT | Mod: GO | Performed by: OCCUPATIONAL THERAPIST

## 2022-04-04 PROCEDURE — 97760 ORTHOTIC MGMT&TRAING 1ST ENC: CPT | Mod: GO | Performed by: OCCUPATIONAL THERAPIST

## 2022-04-04 NOTE — PROGRESS NOTES
[facility]    OUTPATIENT Occupational Therapy ORTHOPEDIC EVALUATION  PLAN OF TREATMENT FOR OUTPATIENT REHABILITATION  (COMPLETE FOR INITIAL CLAIMS ONLY)  Patient's Last Name, First Name, M.I.  YOB: 1965  Nathalie Bashir    Provider s Name:  [facility]   Medical Record No.  [id number]   Start of Care Date:  04/04/22   Onset Date:   03/18/22 (therapy referral)   Type:     ___PT   __X_OT Medical Diagnosis:    Encounter Diagnoses   Name Primary?     Balance problems      Malignant neoplasm of overlapping sites of stomach (H)      Chemotherapy-induced neuropathy (H)      Bilateral hand pain Yes        Treatment Diagnosis:  B hand neuropathy, chemo induced        Goals:     04/04/22 0500   Goal #1   Goal #1 household chores   Previous Performance Level Independent   Current Functional Task Lift   Current Performance Level pain 7/10   STG Target Perfomance Finley   STG Target Perform Level pain 5/10 or less   Due Date 05/03/22   LTG Target Task/Performance Other - on additional line   Other Household Chores pain 3/10 or less with use of AE   Due Date 06/02/22       Therapy Frequency:  2 x per month  Predicted Duration of Therapy Intervention:  2 months    Jada Patino OT                 I CERTIFY THE NEED FOR THESE SERVICES FURNISHED UNDER        THIS PLAN OF TREATMENT AND WHILE UNDER MY CARE     (Physician attestation of this document indicates review and certification of the therapy plan).                       Certification Date From:  04/04/22   Certification Date To:  06/02/22    Referring Provider:  Apurva Dobbs    Initial Assessment        See Epic Evaluation SOC Date: 04/04/22

## 2022-04-04 NOTE — PROGRESS NOTES
Hand Therapy Initial Evaluation     Current Date: 4/4/2022    Diagnosis: Hand neuropathy (chemo induced)   DOI: 3/18/2022 (therapy referral)    Subjective:  Patient Health History  Nathalie Bashir being seen for Work on hands with neuropathy.     Problem began: 3/29/2022.      Pain is reported as 5/10 on pain scale.  General health as reported by patient is fair.  Pertinent medical history includes: cancer, migraines/headaches, numbness/tingling and rheumatoid arthritis.     Medical allergies: latex and adhesives. Other medical allergies details: Rash.   Surgeries include:  Orthopedic surgery and cancer surgery. Other surgery history details: Total hip on left, Whipple, 90% of stomach removeal after stomcah cancer.    Current medications:  Anti-depressants, heparin/coumadin, muscle relaxants, pain medication and sleep medication.       Primary job tasks include:  Other.   Other job/home tasks details: Retired.                Occupational Profile Information:  Right hand dominant  Prior functional level:  independent-shared household chores  Patient reports symptoms of pain, stiffness/loss of motion, weakness/loss of strength, edema, numbness and tingling   Special tests:  EMG scheduled 4/21/2022  Previous treatment: Volterin cream  Barriers include:none  Mobility: Ambulates with aid of cane   Transportation: drives  Leisure activities/hobbies: walk, play with grandkids      Functional Outcome Measure:  Upper Extremity Functional Index  SCORE:   Column Totals: 72/80  (A lower score indicates greater disability.)    Pain Level (Scale 0-10)   4/4/2022   Least 3   Overall  5   Worst 7     Pain Description  Date 4/4/2022   Location elbows, wrists, hands; especially IP joints of fingers   Pain Quality Aching, Burning, Dull, Numb, Sharp and Tingling   Frequency constant     Pain is worst  after using hands   Exacerbated by  lifting, fine motor tasks, cooking, cold, weather changes   Relieved by rest   Progression  Unchanged      Edema  Mild of IP joints of fingers    Sensation  Constant numbness and tingling in glove Like per pt report  [place]-Giovanny Monofilament Sensory Testin2022    Right Left   Thumb 4.31 3.61   Index 4.31 3.61   Long 4.31 3.61   Ring RDN 4.31 3.61   Ring UDN 4.31 3.61   Small 4.31 3.61   2.83:  Normal  3.61:  Diminished light touch  4.31:  Diminished protective sensation  4.56:  Loss of protective sensation  6.65:  Deep pressure sensation  Absent:  Tested with no response    ROM  Pt reports mild morning stiffness in fingers. WNL's on exam.    Strength   (Measured in pounds)  Pain Report: - none  + mild    ++ moderate    +++ severe    2022   Trials Right Left   1  2  3 66+  58+  57+ 58+  54+  55+   Average 60 56     Lat Pinch 2022   Trials Right Left   1  2  3 16-  14-  12+ 13+  15+  12+   Average 14 13     3 Pt Pinch 2022   Trials Right Left   1  2  3 14+  11+  12+ 12+  12+  12+   Average 12 12     Coordination/Dexterity  9 Hole Peg 2022   Right 22.51 secs   Left 24.41 secs     Assessment:  Patient presents with symptoms consistent with diagnosis of hand neuropathy, with conservative intervention.     Patient's limitations or Problem List includes:  Pain, Weakness, Sensory disturbance, Decreased coordination and Decreased dexterity of the bilateral hands which interferes with the patient's ability to perform Self Care Tasks (dressing), Work Tasks, Recreational Activities, Household Chores and Driving  as compared to previous level of function.    Rehab Potential:  Good - Return to full activity, some limitations    Patient will benefit from skilled Occupational Therapy to increase flexibility, overall strength, coordination, dexterity and sensation and decrease pain to return to previous activity level and resume normal daily tasks and to reach their rehab potential.    Barriers to Learning:  No barrier    Communication Issues:  Patient  appears to be able to clearly communicate and understand verbal and written communication and follow directions correctly.    Chart Review: Chart Review and Simple history review with patient    Identified Performance Deficits: dressing, driving and community mobility, home establishment and management, meal preparation and cleanup, work and leisure activities    Assessment of Occupational Performance:  5 or more Performance Deficits    Clinical Decision Making (Complexity): Moderate complexity    Treatment Explanation:  The following has been discussed with the patient:  RX ordered/plan of care  Anticipated outcomes  Possible risks and side effects    Plan:  Frequency:  2 X a month, once daily  Duration:  for 2 months  Treatment Plan:    Modalities:    Paraffin   Therapeutic Exercise:    AROM, PROM, Tendon Gliding, Isotonics, Isometrics and Stabilization  Therapeutic Activities:   Functional activities   Neuromuscular re-ed:   Nerve Gliding, Posture and Kinesiotaping  Manual Techniques:   Myofascial release  Orthotic Fabrication:    Forearm based  Self Care:    Self Care Tasks and Ergonomic Considerations    Discharge Plan:  Achieve all LTG.  Independent in home treatment program.  Reach maximal therapeutic benefit.    Home Exercise Program:  Warmth for stiffness  AROM fingers with 3 fist tendon gliding  AROM thumb E/F and opposition  AROM wrist E/F  Towel gathering and spreading for functional finger ROM  Isometric  strengthening  Left ANASTASIA resting orthosis with isotoner glove sleeping  Sensory precautions to avoid injury, especially for right hand    Next Visit:  See in 1 week  Assess response to HEP and left hand resting orthosis, consider for right  Consider nerve gliding  Add pec stretches and postural exercises   Review and issue adaptive equipment options

## 2022-04-05 ENCOUNTER — MYC REFILL (OUTPATIENT)
Dept: ONCOLOGY | Facility: CLINIC | Age: 57
End: 2022-04-05
Payer: MEDICARE

## 2022-04-05 DIAGNOSIS — M79.2 NEUROPATHIC PAIN: ICD-10-CM

## 2022-04-05 DIAGNOSIS — G89.3 CHRONIC PAIN DUE TO NEOPLASM: ICD-10-CM

## 2022-04-05 RX ORDER — OXYCODONE HYDROCHLORIDE 5 MG/1
5 TABLET ORAL 4 TIMES DAILY PRN
Qty: 120 TABLET | Refills: 0 | Status: SHIPPED | OUTPATIENT
Start: 2022-04-05 | End: 2022-05-02

## 2022-04-05 NOTE — TELEPHONE ENCOUNTER
Received [facility] message from patient requesting refill of oxycodone.     Last refill: 3/8/22  Last office visit: 1/13/22  Scheduled for follow up 4/14/22     Will route request to MD for review.     Reviewed MN  Report.

## 2022-04-12 ENCOUNTER — PRE VISIT (OUTPATIENT)
Dept: UROLOGY | Facility: CLINIC | Age: 57
End: 2022-04-12
Payer: MEDICARE

## 2022-04-12 NOTE — TELEPHONE ENCOUNTER
Reason for visit: Cystoscopy      Relevant information: Mg    Records/imaging/labs/orders: per Wendy Oliveira PA-C    Pt called: no need for a call    At Rooming: virgie Kenny CMA  4/12/2022  4:19 PM

## 2022-04-13 NOTE — PROGRESS NOTES
Palliative Care Progress Note    Patient Name: Nathalie Bashir  Primary Provider: Alban Yuan    Chief Complaint/Patient ID:57 yo F with gastric adenocarcinoma              - Found during Whipple 1/2020 for presumed IPMN - no pancreatic malignancy found, but had poorly differentiated adenoca.  Received neoadjuvant chemo (5FU, leucovorin, oxaliplatin, docetaxel) with curative-intent subtotal gastrectomy, David-en-Y gastrojejunostomy 10/2020.  Required stenting of the jejunum for stenosis causing pain, removed 2/8/21.      Chronic and chemo-induced neuropathy - intol of gabapentin, pregabalin, duloxetine somewhat effective, on TID/QID oxycodone    Last Palliative care appointment: 1/13/22 with me. Referral to PM&R.     Reviewed: Yes, no concerns.    Interim History:  Nathalie Bashir 56 year old female is seen today for follow up with Palliative Care via billable video visit.      Doing okay [place].  Pain is stable.  Has continued taking 1 tablet of oxycodone generally 4 times a day.  Does try to get by with 3 tablets in the day.  Neuropathy going up to her upper knees is having problems overnight.  She is having an EMG on 4/21.  Continues taking Cymbalta 90 mg daily as well.    Has some ongoing constipation feeling even though her bowels are moving.  Currently taking senna 1 to 3 tablets daily and Dulcolax occasionally.  She gets gas pain from MiraLAX.    She has been having some increased vomiting with migraines recently.  In the past, this responded to Zofran and she is wondering if we could refill this.  Migraines are happening about once a month.    She is currently staying at her son's house during the daytime, as there was an issue with CPS and one of her grandsons.  They have a court date set for 4/25 and everyone is hoping that things will be able to return to the normal living arrangements at that point.    She has follow-up with oncology scheduled in May for her annual surveillance scan.      Social History:  Lives with her , son, and 3 grandchildren  Retired, worked in special education.  Social History     Tobacco Use     Smoking status: Former Smoker     Packs/day: 0.50     Years: 25.00     Pack years: 12.50     Types: Cigarettes     Quit date: 2020     Years since quittin.2     Smokeless tobacco: Never Used   Substance Use Topics     Alcohol use: No     Drug use: No       Family History- Reviewed in Epic.    Allergies   Allergen Reactions     Gluten Meal Palpitations     Augmentin Rash     Patient tolerated Zosyn in 2020     Oxycontin [Oxycodone]      Confusion, loopy, oxycodone is tolerated     Pregabalin      interfered with Cymbalta     Topiramate      Tongue numbness, taste alteration, irritable      Acetaminophen Nausea     Urine retention       Dust Mite Extract      Gabapentin Dizziness and GI Disturbance     Ketorolac Headache     Latex Rash     Methadone Fatigue     Mold      Naprosyn [Naproxen] Dizziness and GI Disturbance     Seasonal Allergies      Medications- Reviewed in Epic.    Past Medical History- Reviewed in [facility].    Past Surgical History- Reviewed in Epic.    Review of Systems:   ROS: 10 point ROS neg other than the symptoms noted above in the HPI.    Physical Exam:   Constitutional: Alert, pleasant, no apparent distress. Sitting up in chair.  Eyes: Sclera non-icteric, no eye discharge.  ENT: No nasal discharge. Ears grossly normal.  Respiratory: Unlabored respirations. Speaking in full sentences.  Musculoskeletal: Extremities appear normal- no gross deformities noted. No edema noted on upper body.   Skin: No suspicious lesions or rashes on visible skin.  Neurologic: Clear speech, no aphasia. No facial droop.  Psychiatric: Mentation appears normal, appropriate attention. Affect normal/bright. Does not appear anxious or depressed.      Key Data Reviewed:  LABS: 11/15/2021- Cr 0.91, GFR 71, Albumin 3.3, LFTs WNL.     Impression & Recommendations &  "Counseling:  Nathalie Bashir is a 56 year old female with history of gastric adenocarcinoma found during Whipple 1/2020 for presumed IPMN, no pancreatic malignancy found at that time, now s/p neoadjuvant chemotherapy and curative intent surgery 10/2020.  Is now on surveillance and feeling more back to normal.  Seen for symptom management of peripheral neuropathy that worsened in the setting of chemo.     Pain, neuropathic and arthritic  Balance issues - Has noticed some improvement in neuropathy with increased dose of Cymbalta.  Still having ongoing symptoms that are not fully alleviated by current regimen.  -Continue Cymbalta 90mg daily.   -Continue following with PM&R.  Has EMG scheduled 4/21.  -Continue oxycodone 5 mg up to 4 times daily as needed.  -Previously discussed broadly that she is now in \"survivorship\" with planned surveillance every 6 months after curative-intent.  If we got to a stable point in her care/neuropathy, would recommend transitioning care back to her PCP.  Will continue to follow right now as we're titrating medications.      Constipation - Likely opioid related.  Discussed that she can increase her senna up to a maximum of 8 tablets in a day.    Nausea - 2/2 migraines.  Responds to Zofran.  Refill provided today.      Follow up: 3 months    Video-Visit Details  Video Start Time:  1:50 PM  Video End Time:  2:09 PM    Originating Location (pt. Location): Home     Distant Location (provider location): [facility]     Platform used for Video Visit: Parametric     Total time spent on day of encounter is 30 mins, including reviewing record, review of above studies, above visit with patient, symptomatic discussion of pain/neuropathy, constipation, nausea, including medication adjustments/prescription management, and documentation.     Karla Millard DO  Palliative Medicine   Pager [phone], AMCOM ID [id number]    Some chart documentation performed using Dragon Voice " recognition Software. Although reviewed after completion, some words and grammatical errors may remain.

## 2022-04-14 ENCOUNTER — VIRTUAL VISIT (OUTPATIENT)
Dept: ONCOLOGY | Facility: CLINIC | Age: 57
End: 2022-04-14
Attending: STUDENT IN AN ORGANIZED HEALTH CARE EDUCATION/TRAINING PROGRAM
Payer: MEDICARE

## 2022-04-14 ENCOUNTER — OFFICE VISIT (OUTPATIENT)
Dept: UROLOGY | Facility: CLINIC | Age: 57
End: 2022-04-14
Payer: MEDICARE

## 2022-04-14 DIAGNOSIS — R11.0 NAUSEA: ICD-10-CM

## 2022-04-14 DIAGNOSIS — C16.8 MALIGNANT NEOPLASM OF OVERLAPPING SITES OF STOMACH (H): ICD-10-CM

## 2022-04-14 DIAGNOSIS — G89.3 CHRONIC PAIN DUE TO NEOPLASM: ICD-10-CM

## 2022-04-14 DIAGNOSIS — N39.0 RECURRENT UTI: Primary | ICD-10-CM

## 2022-04-14 DIAGNOSIS — M79.2 NEUROPATHIC PAIN: Primary | ICD-10-CM

## 2022-04-14 DIAGNOSIS — Z51.5 ENCOUNTER FOR PALLIATIVE CARE: ICD-10-CM

## 2022-04-14 LAB
ALBUMIN UR-MCNC: 100 MG/DL
APPEARANCE UR: ABNORMAL
BILIRUB UR QL STRIP: NEGATIVE
COLOR UR AUTO: YELLOW
GLUCOSE UR STRIP-MCNC: NEGATIVE MG/DL
HGB UR QL STRIP: ABNORMAL
KETONES UR STRIP-MCNC: NEGATIVE MG/DL
LEUKOCYTE ESTERASE UR QL STRIP: ABNORMAL
NITRATE UR QL: NEGATIVE
PH UR STRIP: 6 [PH] (ref 5–7)
RBC URINE: 78 /HPF
SP GR UR STRIP: 1.01 (ref 1–1.03)
TRANSITIONAL EPI: 5 /HPF
UROBILINOGEN UR STRIP-MCNC: NORMAL MG/DL
WBC CLUMPS #/AREA URNS HPF: PRESENT /HPF
WBC URINE: >182 /HPF

## 2022-04-14 PROCEDURE — 87186 SC STD MICRODIL/AGAR DIL: CPT | Performed by: UROLOGY

## 2022-04-14 PROCEDURE — G0463 HOSPITAL OUTPT CLINIC VISIT: HCPCS | Mod: PN,RTG | Performed by: STUDENT IN AN ORGANIZED HEALTH CARE EDUCATION/TRAINING PROGRAM

## 2022-04-14 PROCEDURE — 81001 URINALYSIS AUTO W/SCOPE: CPT | Performed by: PATHOLOGY

## 2022-04-14 PROCEDURE — 99214 OFFICE O/P EST MOD 30 MIN: CPT | Mod: 95 | Performed by: STUDENT IN AN ORGANIZED HEALTH CARE EDUCATION/TRAINING PROGRAM

## 2022-04-14 PROCEDURE — 51798 US URINE CAPACITY MEASURE: CPT

## 2022-04-14 RX ORDER — ONDANSETRON 8 MG/1
8 TABLET, FILM COATED ORAL EVERY 8 HOURS PRN
Qty: 90 TABLET | Refills: 2 | Status: SHIPPED | OUTPATIENT
Start: 2022-04-14 | End: 2022-07-27

## 2022-04-14 NOTE — LETTER
4/14/2022         RE: Nathalie Bashir  [address] [place] [address]        Dear Colleague,    Thank you for referring your patient, Nathalie Bashir, to the [facility] [place]. Please see a copy of my visit note below.    Palliative Care Progress Note    Patient Name: Nathalie Bashir  Primary Provider: Alban Yuan    Chief Complaint/Patient ID:55 yo F with gastric adenocarcinoma              - Found during Whipple 1/2020 for presumed IPMN - no pancreatic malignancy found, but had poorly differentiated adenoca.  Received neoadjuvant chemo (5FU, leucovorin, oxaliplatin, docetaxel) with curative-intent subtotal gastrectomy, David-en-Y gastrojejunostomy 10/2020.  Required stenting of the jejunum for stenosis causing pain, removed 2/8/21.      Chronic and chemo-induced neuropathy - intol of gabapentin, pregabalin, duloxetine somewhat effective, on TID/QID oxycodone    Last Palliative care appointment: 1/13/22 with me. Referral to PM&R.     Reviewed: Yes, no concerns.    Interim History:  Nathalie Bashir 56 year old female is seen today for follow up with Palliative Care via billable video visit.      Doing okay [place].  Pain is stable.  Has continued taking 1 tablet of oxycodone generally 4 times a day.  Does try to get by with 3 tablets in the day.  Neuropathy going up to her upper knees is having problems overnight.  She is having an EMG on 4/21.  Continues taking Cymbalta 90 mg daily as well.    Has some ongoing constipation feeling even though her bowels are moving.  Currently taking senna 1 to 3 tablets daily and Dulcolax occasionally.  She gets gas pain from MiraLAX.    She has been having some increased vomiting with migraines recently.  In the past, this responded to Zofran and she is wondering if we could refill this.  Migraines are happening about once a month.    She is currently staying at her son's house during the daytime, as there was an issue with CPS and one of  her grandsons.  They have a court date set for  and everyone is hoping that things will be able to return to the normal living arrangements at that point.    She has follow-up with oncology scheduled in May for her annual surveillance scan.     Social History:  Lives with her , son, and 3 grandchildren  Retired, worked in special education.  Social History     Tobacco Use     Smoking status: Former Smoker     Packs/day: 0.50     Years: 25.00     Pack years: 12.50     Types: Cigarettes     Quit date: 2020     Years since quittin.2     Smokeless tobacco: Never Used   Substance Use Topics     Alcohol use: No     Drug use: No       Family History- Reviewed in Epic.    Allergies   Allergen Reactions     Gluten Meal Palpitations     Augmentin Rash     Patient tolerated Zosyn in 2020     Oxycontin [Oxycodone]      Confusion, loopy, oxycodone is tolerated     Pregabalin      interfered with Cymbalta     Topiramate      Tongue numbness, taste alteration, irritable      Acetaminophen Nausea     Urine retention       Dust Mite Extract      Gabapentin Dizziness and GI Disturbance     Ketorolac Headache     Latex Rash     Methadone Fatigue     Mold      Naprosyn [Naproxen] Dizziness and GI Disturbance     Seasonal Allergies      Medications- Reviewed in Epic.    Past Medical History- Reviewed in [facility].    Past Surgical History- Reviewed in Epic.    Review of Systems:   ROS: 10 point ROS neg other than the symptoms noted above in the HPI.    Physical Exam:   Constitutional: Alert, pleasant, no apparent distress. Sitting up in chair.  Eyes: Sclera non-icteric, no eye discharge.  ENT: No nasal discharge. Ears grossly normal.  Respiratory: Unlabored respirations. Speaking in full sentences.  Musculoskeletal: Extremities appear normal- no gross deformities noted. No edema noted on upper body.   Skin: No suspicious lesions or rashes on visible skin.  Neurologic: Clear speech, no aphasia. No facial  "droop.  Psychiatric: Mentation appears normal, appropriate attention. Affect normal/bright. Does not appear anxious or depressed.      Key Data Reviewed:  LABS: 11/15/2021- Cr 0.91, GFR 71, Albumin 3.3, LFTs WNL.     Impression & Recommendations & Counseling:  Nathalie Bashir is a 56 year old female with history of gastric adenocarcinoma found during Whipple 1/2020 for presumed IPMN, no pancreatic malignancy found at that time, now s/p neoadjuvant chemotherapy and curative intent surgery 10/2020.  Is now on surveillance and feeling more back to normal.  Seen for symptom management of peripheral neuropathy that worsened in the setting of chemo.     Pain, neuropathic and arthritic  Balance issues - Has noticed some improvement in neuropathy with increased dose of Cymbalta.  Still having ongoing symptoms that are not fully alleviated by current regimen.  -Continue Cymbalta 90mg daily.   -Continue following with PM&R.  Has EMG scheduled 4/21.  -Continue oxycodone 5 mg up to 4 times daily as needed.  -Previously discussed broadly that she is now in \"survivorship\" with planned surveillance every 6 months after curative-intent.  If we got to a stable point in her care/neuropathy, would recommend transitioning care back to her PCP.  Will continue to follow right now as we're titrating medications.      Constipation - Likely opioid related.  Discussed that she can increase her senna up to a maximum of 8 tablets in a day.    Nausea - 2/2 migraines.  Responds to Zofran.  Refill provided today.      Follow up: 3 months    Video-Visit Details  Video Start Time:  1:50 PM  Video End Time:  2:09 PM    Originating Location (pt. Location): Home     Distant Location (provider location): [facility]     Platform used for Video Visit: Vdolg     Total time spent on day of encounter is 30 mins, including reviewing record, review of above studies, above visit with patient, symptomatic discussion of pain/neuropathy, " constipation, nausea, including medication adjustments/prescription management, and documentation.     Karla Millard DO  Palliative Medicine   Pager [phone], AMCOM ID [id number]    Some chart documentation performed using Dragon Voice recognition Software. Although reviewed after completion, some words and grammatical errors may remain.      Nathalie is a 56 year old who is being evaluated via a billable video visit.      How would you like to obtain your AVS? MyChart  If the video visit is dropped, the invitation should be resent by: Text to cell phone: [phone]  Will anyone else be joining your video visit? No       Tam Farias          Again, thank you for allowing me to participate in the care of your patient.        Sincerely,        Karla Millard,

## 2022-04-14 NOTE — PROGRESS NOTES
Nathalie Bashir comes into clinic today at the request of Dr Velez with the diagnosis of retention and recurrent UTIs for a TOV and urine sample.    Bladder scanned prior to void:104  Pt able to void. PVR 0    Urine noted to be very cloudy and odorous. Pt requesting to have urine sent to lab. Believes she has another UTI. Collected sample and sent to lab.     Patient tolerated procedure well.      Education: Teaching done with patient verbally as to where to go or call  if unable to urinate or recurrent symptoms that don't resolve    Plan: Follow-up Dr. Velez 4/28 for cysto      This service provided today was under the supervising provider of the day Dr. Velez, who was available if needed.    Alondra Gibson RN

## 2022-04-14 NOTE — PATIENT INSTRUCTIONS
Recommendations:  -Okay to continue oxycodone up to 4 times daily.  If you are able to get by with 3 tablets in a day that would be preferred.  -Refilled Zofran for you to use for nausea associated with her migraines.  -We discussed that you can take up to 8 tablets of senna in a day, so if you need to increase to 5 or 6, that would be okay.    Follow up: About 3 months, but please reach out sooner with questions or concerns.      Reasons to Call    If you are having worsening/uncontrolled symptoms we want you to call!    You or your other physicians make any changes to medications we have prescribed.  -Please call for refills 4-5 days before you will run out of medication.    Important Phone Numbers    [place] [facility] - Astrid Santiago. Palliative Care RN - [phone]    [facility] - Martine Russell. Palliative Care RN - [phone]  *For Refills, scheduling, and general questions - [phone]  *After hours or on weekends. Will connect you with on call MD. [phone]

## 2022-04-14 NOTE — LETTER
4/14/2022         RE: Nathalie Bashir  [address] [facility] [address]        Dear Colleague,    Thank you for referring your patient, Nathalie Bashir, to the [facility] [place]. Please see a copy of my visit note below.    Palliative Care Progress Note    Patient Name: Nathalie Bashir  Primary Provider: Alban Yuan    Chief Complaint/Patient ID:57 yo F with gastric adenocarcinoma              - Found during Whipple 1/2020 for presumed IPMN - no pancreatic malignancy found, but had poorly differentiated adenoca.  Received neoadjuvant chemo (5FU, leucovorin, oxaliplatin, docetaxel) with curative-intent subtotal gastrectomy, David-en-Y gastrojejunostomy 10/2020.  Required stenting of the jejunum for stenosis causing pain, removed 2/8/21.      Chronic and chemo-induced neuropathy - intol of gabapentin, pregabalin, duloxetine somewhat effective, on TID/QID oxycodone    Last Palliative care appointment: 1/13/22 with me. Referral to PM&R.     Reviewed: Yes, no concerns.    Interim History:  Nathalie Bashir 56 year old female is seen today for follow up with Palliative Care via billable video visit.      Doing okay Cecilia.  Pain is stable.  Has continued taking 1 tablet of oxycodone generally 4 times a day.  Does try to get by with 3 tablets in the day.  Neuropathy going up to her upper knees is having problems overnight.  She is having an EMG on 4/21.  Continues taking Cymbalta 90 mg daily as well.    Has some ongoing constipation feeling even though her bowels are moving.  Currently taking senna 1 to 3 tablets daily and Dulcolax occasionally.  She gets gas pain from MiraLAX.    She has been having some increased vomiting with migraines recently.  In the past, this responded to Zofran and she is wondering if we could refill this.  Migraines are happening about once a month.    She is currently staying at her son's house during the daytime, as there was an issue with CPS and one of  her grandsons.  They have a court date set for  and everyone is hoping that things will be able to return to the normal living arrangements at that point.    She has follow-up with oncology scheduled in May for her annual surveillance scan.     Social History:  Lives with her , son, and 3 grandchildren  Retired, worked in special education.  Social History     Tobacco Use     Smoking status: Former Smoker     Packs/day: 0.50     Years: 25.00     Pack years: 12.50     Types: Cigarettes     Quit date: 2020     Years since quittin.2     Smokeless tobacco: Never Used   Substance Use Topics     Alcohol use: No     Drug use: No       Family History- Reviewed in Epic.    Allergies   Allergen Reactions     Gluten Meal Palpitations     Augmentin Rash     Patient tolerated Zosyn in 2020     Oxycontin [Oxycodone]      Confusion, loopy, oxycodone is tolerated     Pregabalin      interfered with Cymbalta     Topiramate      Tongue numbness, taste alteration, irritable      Acetaminophen Nausea     Urine retention       Dust Mite Extract      Gabapentin Dizziness and GI Disturbance     Ketorolac Headache     Latex Rash     Methadone Fatigue     Mold      Naprosyn [Naproxen] Dizziness and GI Disturbance     Seasonal Allergies      Medications- Reviewed in Epic.    Past Medical History- Reviewed in [facility].    Past Surgical History- Reviewed in Epic.    Review of Systems:   ROS: 10 point ROS neg other than the symptoms noted above in the HPI.    Physical Exam:   Constitutional: Alert, pleasant, no apparent distress. Sitting up in chair.  Eyes: Sclera non-icteric, no eye discharge.  ENT: No nasal discharge. Ears grossly normal.  Respiratory: Unlabored respirations. Speaking in full sentences.  Musculoskeletal: Extremities appear normal- no gross deformities noted. No edema noted on upper body.   Skin: No suspicious lesions or rashes on visible skin.  Neurologic: Clear speech, no aphasia. No facial  "droop.  Psychiatric: Mentation appears normal, appropriate attention. Affect normal/bright. Does not appear anxious or depressed.      Key Data Reviewed:  LABS: 11/15/2021- Cr 0.91, GFR 71, Albumin 3.3, LFTs WNL.     Impression & Recommendations & Counseling:  Nathalie Bashir is a 56 year old female with history of gastric adenocarcinoma found during Whipple 1/2020 for presumed IPMN, no pancreatic malignancy found at that time, now s/p neoadjuvant chemotherapy and curative intent surgery 10/2020.  Is now on surveillance and feeling more back to normal.  Seen for symptom management of peripheral neuropathy that worsened in the setting of chemo.     Pain, neuropathic and arthritic  Balance issues - Has noticed some improvement in neuropathy with increased dose of Cymbalta.  Still having ongoing symptoms that are not fully alleviated by current regimen.  -Continue Cymbalta 90mg daily.   -Continue following with PM&R.  Has EMG scheduled 4/21.  -Continue oxycodone 5 mg up to 4 times daily as needed.  -Previously discussed broadly that she is now in \"survivorship\" with planned surveillance every 6 months after curative-intent.  If we got to a stable point in her care/neuropathy, would recommend transitioning care back to her PCP.  Will continue to follow right now as we're titrating medications.      Constipation - Likely opioid related.  Discussed that she can increase her senna up to a maximum of 8 tablets in a day.    Nausea - 2/2 migraines.  Responds to Zofran.  Refill provided today.      Follow up: 3 months    Video-Visit Details  Video Start Time:  1:50 PM  Video End Time:  2:09 PM    Originating Location (pt. Location): Home     Distant Location (provider location): [facility]     Platform used for Video Visit: [employer]     Total time spent on day of encounter is 30 mins, including reviewing record, review of above studies, above visit with patient, symptomatic discussion of pain/neuropathy, " constipation, nausea, including medication adjustments/prescription management, and documentation.     Karla Millard DO  Palliative Medicine   Pager [phone], AMCOM ID [id number]    Some chart documentation performed using Dragon Voice recognition Software. Although reviewed after completion, some words and grammatical errors may remain.      Nathalie is a 56 year old who is being evaluated via a billable video visit.      How would you like to obtain your AVS? MyChart  If the video visit is dropped, the invitation should be resent by: Text to cell phone: [phone]  Will anyone else be joining your video visit? No       Tam Farias          Again, thank you for allowing me to participate in the care of your patient.        Sincerely,        Karla Millard,

## 2022-04-14 NOTE — PROGRESS NOTES
Nathalie is a 56 year old who is being evaluated via a billable video visit.      How would you like to obtain your AVS? MyChart  If the video visit is dropped, the invitation should be resent by: Text to cell phone: [phone]  Will anyone else be joining your video visit? Livia Farias

## 2022-04-15 DIAGNOSIS — N39.0 RECURRENT UTI: Primary | ICD-10-CM

## 2022-04-15 LAB — BACTERIA UR CULT: ABNORMAL

## 2022-04-15 RX ORDER — SULFAMETHOXAZOLE/TRIMETHOPRIM 800-160 MG
1 TABLET ORAL 2 TIMES DAILY
Qty: 10 TABLET | Refills: 0 | Status: SHIPPED | OUTPATIENT
Start: 2022-04-15 | End: 2022-04-20

## 2022-04-27 ENCOUNTER — TELEPHONE (OUTPATIENT)
Dept: UROLOGY | Facility: CLINIC | Age: 57
End: 2022-04-27
Payer: MEDICARE

## 2022-04-27 RX ORDER — SULFAMETHOXAZOLE/TRIMETHOPRIM 800-160 MG
1 TABLET ORAL 2 TIMES DAILY
Status: CANCELLED | OUTPATIENT
Start: 2022-04-27

## 2022-04-27 NOTE — TELEPHONE ENCOUNTER
[facility] Call Center    Phone Message    May a detailed message be left on voicemail: yes     Reason for Call: Patient calling because she received a message on [employer] about her UTI and that she might not be able to have cysto tomorrow 4/28. Patient is confused because she had UTI a few weeks ago and was treated for it. Please reach out to discuss. Thank you    Action Taken: Message routed to:  [facility] Uro    Travel Screening: Not Applicable

## 2022-04-27 NOTE — TELEPHONE ENCOUNTER
Patient was treated a last week from 4/14-4/20/22. Patient denies having any urinary symptoms at this time.    Samreen Rizo RN, BSN  Care Coordinator Urology

## 2022-04-28 ENCOUNTER — OFFICE VISIT (OUTPATIENT)
Dept: UROLOGY | Facility: CLINIC | Age: 57
End: 2022-04-28
Payer: MEDICARE

## 2022-04-28 VITALS
SYSTOLIC BLOOD PRESSURE: 104 MMHG | HEIGHT: 68 IN | HEART RATE: 86 BPM | BODY MASS INDEX: 25.76 KG/M2 | WEIGHT: 170 LBS | DIASTOLIC BLOOD PRESSURE: 70 MMHG

## 2022-04-28 DIAGNOSIS — C16.2 MALIGNANT NEOPLASM OF BODY OF STOMACH (H): ICD-10-CM

## 2022-04-28 DIAGNOSIS — N39.0 RECURRENT UTI: Primary | ICD-10-CM

## 2022-04-28 LAB
ALBUMIN UR-MCNC: NEGATIVE MG/DL
APPEARANCE UR: CLEAR
BILIRUB UR QL STRIP: NEGATIVE
COLOR UR AUTO: YELLOW
GLUCOSE UR STRIP-MCNC: NEGATIVE MG/DL
HGB UR QL STRIP: NEGATIVE
KETONES UR STRIP-MCNC: NEGATIVE MG/DL
LEUKOCYTE ESTERASE UR QL STRIP: ABNORMAL
NITRATE UR QL: NEGATIVE
PH UR STRIP: 6 [PH] (ref 5–8)
SP GR UR STRIP: 1.01 (ref 1–1.03)
UROBILINOGEN UR STRIP-ACNC: 0.2 E.U./DL

## 2022-04-28 PROCEDURE — 52000 CYSTOURETHROSCOPY: CPT | Performed by: UROLOGY

## 2022-04-28 PROCEDURE — 99212 OFFICE O/P EST SF 10 MIN: CPT | Mod: 25 | Performed by: UROLOGY

## 2022-04-28 ASSESSMENT — PAIN SCALES - GENERAL: PAINLEVEL: NO PAIN (0)

## 2022-04-28 NOTE — PROGRESS NOTES
"April 28, 2022    Return visit    Patient returns today for follow up. Completed the last course of antibiotics. She denies any changes in her health since last visit.    /70   Pulse 86   Ht 1.727 m (5' 8\")   Wt 77.1 kg (170 lb)   LMP 09/23/2014   BMI 25.85 kg/m    She is comfortable, in no distress, non-labored breathing.  Abdomen is soft, non-tender, non-distended.  Normal external female genitalia.  Negative CST.  Pelvic exam is unremarkable    Cystoscopy Note: After informed consent was obtained patient was prepped and draped in the standard fashion.  The flexible cystoscope was inserted into a normal appearing urethral meatus.  The urothelium was carefully examined and there were no tumors, masses, stones, foreign bodies, or other urothelial abnormalities noted.  Bilateral ureteral orifices were noted in the normal orthotopic position and both effluxed clear urine.  The cystoscope was retroflexed and the bladder neck was unremarkable.  The urethra was carefully examined upon removing the cystoscope and was unremarkable.  Patient tolerated the procedure without complications noted.      A/P: 56 year old F with gM    Discussed UTI options    Recommend supplements and monitoring    RTC 3 months, sooner if needed    10 minutes were spent today on the date of the encounter in reviewing the EMR, direct patient care, coordination of care and documentation in addition to the cystoscopy procedure    Cailin Velez MD MPH  (she/her/hers)   of Urology  [facility]  Patient Care Team:  Alban Yuan MD as PCP - General (Neurology)  Arnol Santiago MD as MD (Orthopedics)  Magaly Pollard MD as MD (Hematology & Oncology)  Myranda Peter MD as Assigned PCP  Magaly Pollard MD as Assigned Cancer Care Provider  Precious Scott MD as Assigned Palliative Care Provider  Jeet Aviles MD as Assigned Gastroenterology Provider  Vandana Mari, REBECCA " as Specialty Care Coordinator (Hematology & Oncology)  Beverly Finn PA-C as Physician Assistant (Gastroenterology)  Wendy Oliveira PA as Physician Assistant (Urology)  Apurva Dobbs MD as Assigned Neuroscience Provider  Wendy Oliveira PA as Assigned Surgical Provider  Apurva Dobbs MD as Physician (Physical Medicine and Rehabilitation)  SELF, REFERRED

## 2022-04-28 NOTE — LETTER
"4/28/2022       RE: Nathalie Bashir  [address] [facility] [address]     Dear Colleague,    Thank you for referring your patient, Nathalie Bashir, to the [facility] [place] at [facility]. Please see a copy of my visit note below.    April 28, 2022    Return visit    Patient returns today for follow up. Completed the last course of antibiotics. She denies any changes in her health since last visit.    /70   Pulse 86   Ht 1.727 m (5' 8\")   Wt 77.1 kg (170 lb)   LMP 09/23/2014   BMI 25.85 kg/m    She is comfortable, in no distress, non-labored breathing.  Abdomen is soft, non-tender, non-distended.  Normal external female genitalia.  Negative CST.  Pelvic exam is unremarkable    Cystoscopy Note: After informed consent was obtained patient was prepped and draped in the standard fashion.  The flexible cystoscope was inserted into a normal appearing urethral meatus.  The urothelium was carefully examined and there were no tumors, masses, stones, foreign bodies, or other urothelial abnormalities noted.  Bilateral ureteral orifices were noted in the normal orthotopic position and both effluxed clear urine.  The cystoscope was retroflexed and the bladder neck was unremarkable.  The urethra was carefully examined upon removing the cystoscope and was unremarkable.  Patient tolerated the procedure without complications noted.      A/P: 56 year old F with Mg    Discussed UTI options    Recommend supplements and monitoring    RTC 3 months, sooner if needed    10 minutes were spent today on the date of the encounter in reviewing the EMR, direct patient care, coordination of care and documentation in addition to the cystoscopy procedure    Cailin Velez MD MPH  (she/her/hers)   of Urology  [facility]      CC  Patient Care Team:  Alban Yuan MD as PCP - General (Neurology)  Arnol Santiago MD as MD " (Orthopedics)  Magaly Pollard MD as MD (Hematology & Oncology)  Myranda Peter MD as Assigned PCP  Magaly Pollard MD as Assigned Cancer Care Provider  Precious Scott MD as Assigned Palliative Care Provider  Jeet Aviles MD as Assigned Gastroenterology Provider  Vandana Mari, RN as Specialty Care Coordinator (Hematology & Oncology)  Beverly Finn PA-C as Physician Assistant (Gastroenterology)  Wendy Oliveira PA as Physician Assistant (Urology)  Apurva Dobbs MD as Assigned Neuroscience Provider  Wendy Oliveira PA as Assigned Surgical Provider  Apurva Dobbs MD as Physician (Physical Medicine and Rehabilitation)  SELF, REFERRED

## 2022-04-28 NOTE — PATIENT INSTRUCTIONS
"Websites with free information:    American [employer]ro[employer] Society patient website: www.voicesforpfd.org    Total Control Program: www.totalcontrolprogram.com    Supplements to prevent UTI to consider  -Probiotics  -Cranberry (for these products let them know a doctor is recommending them)   Ellura: www.myellura.[employer]   Theracran HP by [employer] [facility] [id number]  -d-mannose 2gm daily  -Vitamin C 500-1000mg twice a day    It was a pleasure meeting with you today.  Thank you for allowing me and my team the privilege of caring for you today.  YOU are the reason we are here, and I truly hope we provided you with the excellent service you deserve.  Please let us know if there is anything else we can do for you so that we can be sure you are leaving completely satisfied with your care experience.      AFTER YOUR CYSTOSCOPY        You have just completed a cystoscopy, or \"cysto\", which allowed your physician to learn more about your bladder (or to remove a stent placed after surgery). We suggest that you continue to avoid caffeine, fruit juice, and alcohol for the next 24 hours, however, you are encouraged to return to your normal activities.         A few things that are considered normal after your cystoscopy:     * Small amount of bleeding (or spotting) that clears within the next 24 hours     * Slight burning sensation with urination     * Sensation to of needing to avoid more frequently     * The feeling of \"air\" in your urine     * Mild discomfort that is relieved with Tylenol        Please contact our office promptly if you:     * Develop a fever above 101 degrees     * Are unable to urinate     * Develop bright red blood that does not stop     * Severe pain or swelling         Please contact our office with any concerns or questions @[phone]  "

## 2022-04-28 NOTE — NURSING NOTE
"Chief Complaint   Patient presents with     Cystoscopy     Mg       Blood pressure 104/70, pulse 86, height 1.727 m (5' 8\"), weight 77.1 kg (170 lb), last menstrual period 09/23/2014, not currently breastfeeding. Body mass index is 25.85 kg/m .    Patient Active Problem List   Diagnosis     Lower limb length difference     IPMN (intraductal papillary mucinous neoplasm)     Allergic rhinitis     Arthralgia of hip     Bursitis     Chronic neck pain     Chronic pain disorder     Continuous opioid dependence (H)     Controlled substance agreement signed     Debility     Degeneration of intervertebral disc of lumbosacral region     Depression     Disorder of sacrum     Greater trochanteric bursitis of left hip     Infected prosthesis of left hip (H)     Chronic bilateral low back pain without sciatica     MDD (major depressive disorder), recurrent, in partial remission (H)     Migraine with aura     Sacroiliitis (H)     Screening for malignant neoplasm of cervix     Spondylosis of lumbosacral spine without myelopathy     Status post left hip replacement     Cervical radiculopathy, chronic     Unilateral inguinal hernia without obstruction or gangrene     Long term (current) use of opiate analgesic     Low urine output     Hypophosphatemia     Leukocytosis     Intra-abdominal fluid collection     Bacteremia     Malnutrition (H)     Gastric adenocarcinoma (H)     Pneumonia     Mucositis     Chemotherapy-induced neutropenia (H)     Gastric cancer (H)     Cholangitis     Thrombocytopenia (H)     Urinary tract infection     Crohn's disease of small intestine (H)     Jejunitis     Colitis     Abdominal pain     Malignant neoplasm of overlapping sites of stomach (H)     Iron deficiency anemia     Anemia     Paralytic ileus (H)     Hypokalemia     Nausea & vomiting     Dysphagia     History of gastric surgery     Chemotherapy-induced neuropathy (H)     Bilateral hand pain       Allergies   Allergen Reactions     Gluten Meal " Palpitations     Augmentin Rash     Patient tolerated Zosyn in 7/2020     Oxycontin [Oxycodone]      Confusion, loopy, oxycodone is tolerated     Pregabalin      interfered with Cymbalta     Topiramate      Tongue numbness, taste alteration, irritable      Acetaminophen Nausea     Urine retention       Dust Mite Extract      Gabapentin Dizziness and GI Disturbance     Ketorolac Headache     Latex Rash     Methadone Fatigue     Mold      Naprosyn [Naproxen] Dizziness and GI Disturbance     Seasonal Allergies        Current Outpatient Medications   Medication Sig Dispense Refill     amitriptyline (ELAVIL) 100 MG tablet Take 1 tablet (100 mg) by mouth At Bedtime 30 tablet 3     calcium carbonate 500 mg, elemental, (OSCAL 500) 1250 (500 Ca) MG TABS tablet Take by mouth 3 times daily       Cyanocobalamin 1000 MCG CAPS Take by mouth daily       cyclobenzaprine (FLEXERIL) 10 MG tablet TAKE 1 TABLET BY MOUTH 1-3 TIMES DAILY       cyclobenzaprine (FLEXERIL) 10 MG tablet Take 10 mg by mouth 3 times daily as needed for muscle spasms       diclofenac (FLECTOR) 1.3 % patch apply 1 patch to affected area twice daily       DULoxetine (CYMBALTA) 30 MG capsule Take 3 capsules (90 mg) by mouth daily 90 capsule 2     famotidine (PEPCID) 20 MG tablet Take 20 mg by mouth 2 times daily as needed        fluticasone (FLONASE) 50 MCG/ACT nasal spray Spray 1 spray into both nostrils daily       hydrOXYzine (VISTARIL) 25 MG capsule Take 25-50 mg by mouth 4 times daily as needed for itching       loratadine-pseudoePHEDrine (CLARITIN-D 24-HOUR)  MG 24 hr tablet Take 1 tablet by mouth daily as needed        minocycline (MINOCIN) 100 MG capsule Take 1 capsule by mouth 2 times daily       multivitamin w/minerals (THERA-VIT-M) tablet Take 1 tablet by mouth daily       ondansetron (ZOFRAN) 8 MG tablet Take 1 tablet (8 mg) by mouth every 8 hours as needed (Nausea/Vomiting) 90 tablet 2     oxyCODONE (ROXICODONE) 5 MG tablet Take 1 tablet (5 mg)  by mouth 4 times daily as needed for moderate to severe pain Maximum 4 pills/day. 120 tablet 0     pantoprazole (PROTONIX) 40 MG EC tablet TAKE 1 TABLET BY MOUTH EVERY DAY 90 tablet 1     sennosides (SENOKOT) 8.6 MG tablet 1-2 tablets 1-2 times daily as needed. 120 tablet 1     simethicone 80 MG TABS Take 1 tablet by mouth every 6 hours as needed (bloating, gas, belching) 90 tablet 3     SUMAtriptan (IMITREX) 100 MG tablet Take 100 mg by mouth at onset of headache for migraine       tiZANidine (ZANAFLEX) 4 MG capsule Take 1 capsule by mouth every 8 hours       amylase-lipase-protease (CREON 24) 79035-34333 units CPEP per EC capsule 2 capsules with meals and 1 capsule with snacks (Patient not taking: No sig reported) 240 capsule 4     loperamide (IMODIUM) 2 MG capsule Take 4 mg by mouth 4 times daily as needed for diarrhea  (Patient not taking: No sig reported)       magic mouthwash (ENTER INGREDIENTS IN COMMENTS) suspension Take 5 mLs by mouth (Patient not taking: Reported on 2022)       magnesium 250 MG tablet Take 1 tablet by mouth daily (Patient not taking: No sig reported)       potassium chloride ER (KLOR-CON M) 20 MEQ CR tablet TAKE ONE TABLET BY MOUTH ONCE DAILY (Patient not taking: No sig reported) 30 tablet 1     prochlorperazine (COMPAZINE) 10 MG tablet Take 1 tablet (10 mg) by mouth every 6 hours as needed for nausea or vomiting (Patient not taking: No sig reported) 60 tablet 3     sucralfate (CARAFATE) 1 GM tablet Take 1 tablet (1 g) by mouth 4 times daily as needed (reflux.) (Patient not taking: No sig reported) 360 tablet 6       Social History     Tobacco Use     Smoking status: Former Smoker     Packs/day: 0.50     Years: 25.00     Pack years: 12.50     Types: Cigarettes     Quit date: 2020     Years since quittin.3     Smokeless tobacco: Never Used   Substance Use Topics     Alcohol use: No     Drug use: No       Shona Kenny CMA  2022  7:55 AM     Invasive Procedure Safety  Checklist:    Procedure: Cystoscopy     Action: Complete sections and checkboxes as appropriate.    Pre-procedure:  1. Patient ID Verified with 2 identifiers (Gina and  or MRN) : YES    2. Procedure and site verified with patient/designee (when able) : YES    3. Accurate consent documentation in medical record : YES    4. H&P (or appropriate assessment) documented in medical record : YES  H&P must be up to 30 days prior to procedure an updated within 24 hours of                 Procedure as applicable.     5. Relevant diagnostic and radiology test results appropriately labeled and displayed as applicable : YES    6. Blood products, implants, devices, and/or special equipment available for the procedure as applicable : YES    7. Procedure site(s) marked with provider initials [Exclusions: None] : NO    8. Marking not required. Reason : Yes  Procedure does not require site marking    Time Out:     Time-Out performed immediately prior to starting procedure, including verbal and active participation of all team members addressing: YES    1. Correct patient identity.  2. Confirmed that the correct side and site are marked.  3. An accurate procedure to be done.  4. Agreement on the procedure to be done.  5. Correct patient position.  6. Relevant images and results are properly labeled and appropriately displayed.  7. The need to administer antibiotics or fluids for irrigation purposes during the procedure as applicable.  8. Safety precautions based on patient history or medication use.    During Procedure: Verification of correct person, site, and procedure occurs any time the responsibility for care of the patient is transferred to another member of the care team.    No medications administered during this procedure.    Shona Kenny CMA  2022

## 2022-04-30 ENCOUNTER — HEALTH MAINTENANCE LETTER (OUTPATIENT)
Age: 57
End: 2022-04-30

## 2022-05-02 ENCOUNTER — PRE VISIT (OUTPATIENT)
Dept: GASTROENTEROLOGY | Facility: CLINIC | Age: 57
End: 2022-05-02
Payer: MEDICARE

## 2022-05-02 ENCOUNTER — MYC REFILL (OUTPATIENT)
Dept: ONCOLOGY | Facility: CLINIC | Age: 57
End: 2022-05-02
Payer: MEDICARE

## 2022-05-02 DIAGNOSIS — G89.3 CHRONIC PAIN DUE TO NEOPLASM: ICD-10-CM

## 2022-05-02 DIAGNOSIS — M79.2 NEUROPATHIC PAIN: ICD-10-CM

## 2022-05-02 RX ORDER — OXYCODONE HYDROCHLORIDE 5 MG/1
5 TABLET ORAL 4 TIMES DAILY PRN
Qty: 120 TABLET | Refills: 0 | Status: SHIPPED | OUTPATIENT
Start: 2022-05-03 | End: 2022-05-30

## 2022-05-02 NOTE — TELEPHONE ENCOUNTER
REFERRAL INFORMATION:    Referring Provider:  DAYTON RODRIGUEZ    Referring Clinic:  [facility]    Reason for Visit/Diagnosis: CONSTIPATION     FUTURE VISIT INFORMATION:    Appointment Date: 05/02/2022    Appointment Time: 2PM     NOTES STATUS DETAILS   OFFICE NOTE from Referring Provider Care Everywhere 03/02/2022  PT CONSULT 03/09/2022   OFFICE NOTE from Other Specialist Care Everywhere 02/08/2021   HOSPITAL DISCHARGE SUMMARY/  ED VISITS Internal DR STEVE  12/07/2020   OPERATIVE REPORT Internal DR BENZ  12/07/2020   MEDICATION LIST Internal AND CE        ENDOSCOPY  INTERNAL DAYTON RODRIGUEZ NP  02/08/2021 12/07/2020   COLONOSCOPY Care Everywhere 07/20/2018   ERCP N/A    EUS Care Everywhere 10/29/2019   STOOL TESTING N/A    PERTINENT LABS Care Everywhere 11/14/2021 TO 03/09/2022   PATHOLOGY REPORTS (RELATED) N/A    IMAGING (CT, MRI, EGD, MRCP, Small Bowel Follow Through/SBT, MR/CT Enterography) CARE EVERYWHERE 02/08/2021

## 2022-05-02 NOTE — TELEPHONE ENCOUNTER
Received [employer] message from patient requesting refill of oxycodone.     Last refill: 4/5/2022  Last office visit: 4/14/2022  To be scheduled for 3 month RTN per check out order.     Will route request to MD for review.     Reviewed [place]  Report.

## 2022-05-04 DIAGNOSIS — M79.2 NEUROPATHIC PAIN: ICD-10-CM

## 2022-05-04 DIAGNOSIS — G89.3 CHRONIC PAIN DUE TO NEOPLASM: ICD-10-CM

## 2022-05-04 RX ORDER — DULOXETIN HYDROCHLORIDE 30 MG/1
90 CAPSULE, DELAYED RELEASE ORAL DAILY
Qty: 90 CAPSULE | Refills: 2 | Status: SHIPPED | OUTPATIENT
Start: 2022-05-04 | End: 2022-07-27

## 2022-05-04 NOTE — TELEPHONE ENCOUNTER
Received [employer] message from pharmacy requesting refill of duloxetine.     Last refill: 2/3/2022  Last office visit: 4/14/2022      Will route request to MD for review.

## 2022-05-09 ENCOUNTER — TELEPHONE (OUTPATIENT)
Dept: UROLOGY | Facility: CLINIC | Age: 57
End: 2022-05-09

## 2022-05-09 ENCOUNTER — LAB (OUTPATIENT)
Dept: LAB | Facility: CLINIC | Age: 57
End: 2022-05-09
Payer: MEDICARE

## 2022-05-09 ENCOUNTER — NURSE TRIAGE (OUTPATIENT)
Dept: UROLOGY | Facility: CLINIC | Age: 57
End: 2022-05-09

## 2022-05-09 DIAGNOSIS — N39.0 RECURRENT UTI: ICD-10-CM

## 2022-05-09 DIAGNOSIS — C16.8 MALIGNANT NEOPLASM OF OVERLAPPING SITES OF STOMACH (H): ICD-10-CM

## 2022-05-09 DIAGNOSIS — N39.0 URINARY TRACT INFECTION: Primary | ICD-10-CM

## 2022-05-09 DIAGNOSIS — D50.9 IRON DEFICIENCY ANEMIA, UNSPECIFIED IRON DEFICIENCY ANEMIA TYPE: ICD-10-CM

## 2022-05-09 LAB
ALBUMIN SERPL-MCNC: 3.2 G/DL (ref 3.4–5)
ALBUMIN UR-MCNC: NEGATIVE MG/DL
ALP SERPL-CCNC: 132 U/L (ref 40–150)
ALT SERPL W P-5'-P-CCNC: 44 U/L (ref 0–50)
ANION GAP SERPL CALCULATED.3IONS-SCNC: 4 MMOL/L (ref 3–14)
APPEARANCE UR: ABNORMAL
AST SERPL W P-5'-P-CCNC: 39 U/L (ref 0–45)
BACTERIA #/AREA URNS HPF: ABNORMAL /HPF
BASOPHILS # BLD AUTO: 0 10E3/UL (ref 0–0.2)
BASOPHILS NFR BLD AUTO: 1 %
BILIRUB SERPL-MCNC: 1 MG/DL (ref 0.2–1.3)
BILIRUB UR QL STRIP: NEGATIVE
BUN SERPL-MCNC: 14 MG/DL (ref 7–30)
CALCIUM SERPL-MCNC: 8.5 MG/DL (ref 8.5–10.1)
CHLORIDE BLD-SCNC: 107 MMOL/L (ref 94–109)
CO2 SERPL-SCNC: 30 MMOL/L (ref 20–32)
COLOR UR AUTO: YELLOW
CREAT SERPL-MCNC: 0.98 MG/DL (ref 0.52–1.04)
EOSINOPHIL # BLD AUTO: 0.2 10E3/UL (ref 0–0.7)
EOSINOPHIL NFR BLD AUTO: 4 %
ERYTHROCYTE [DISTWIDTH] IN BLOOD BY AUTOMATED COUNT: 13 % (ref 10–15)
FERRITIN SERPL-MCNC: 149 NG/ML (ref 8–252)
GFR SERPL CREATININE-BSD FRML MDRD: 68 ML/MIN/1.73M2
GLUCOSE BLD-MCNC: 109 MG/DL (ref 70–99)
GLUCOSE UR STRIP-MCNC: NEGATIVE MG/DL
HCT VFR BLD AUTO: 32.5 % (ref 35–47)
HGB BLD-MCNC: 10.5 G/DL (ref 11.7–15.7)
HGB UR QL STRIP: ABNORMAL
IMM GRANULOCYTES # BLD: 0 10E3/UL
IMM GRANULOCYTES NFR BLD: 0 %
IRON SATN MFR SERPL: 20 % (ref 15–46)
IRON SERPL-MCNC: 48 UG/DL (ref 35–180)
KETONES UR STRIP-MCNC: NEGATIVE MG/DL
LEUKOCYTE ESTERASE UR QL STRIP: ABNORMAL
LYMPHOCYTES # BLD AUTO: 1.5 10E3/UL (ref 0.8–5.3)
LYMPHOCYTES NFR BLD AUTO: 38 %
MCH RBC QN AUTO: 30.9 PG (ref 26.5–33)
MCHC RBC AUTO-ENTMCNC: 32.3 G/DL (ref 31.5–36.5)
MCV RBC AUTO: 96 FL (ref 78–100)
MONOCYTES # BLD AUTO: 0.4 10E3/UL (ref 0–1.3)
MONOCYTES NFR BLD AUTO: 11 %
NEUTROPHILS # BLD AUTO: 1.9 10E3/UL (ref 1.6–8.3)
NEUTROPHILS NFR BLD AUTO: 46 %
NITRATE UR QL: POSITIVE
PH UR STRIP: 6 [PH] (ref 5–7)
PLATELET # BLD AUTO: 222 10E3/UL (ref 150–450)
POTASSIUM BLD-SCNC: 4.2 MMOL/L (ref 3.4–5.3)
PROT SERPL-MCNC: 6.4 G/DL (ref 6.8–8.8)
RBC # BLD AUTO: 3.4 10E6/UL (ref 3.8–5.2)
RBC #/AREA URNS AUTO: ABNORMAL /HPF
SODIUM SERPL-SCNC: 141 MMOL/L (ref 133–144)
SP GR UR STRIP: 1.02 (ref 1–1.03)
SQUAMOUS #/AREA URNS AUTO: ABNORMAL /LPF
TIBC SERPL-MCNC: 246 UG/DL (ref 240–430)
UROBILINOGEN UR STRIP-ACNC: 0.2 E.U./DL
WBC # BLD AUTO: 4 10E3/UL (ref 4–11)
WBC #/AREA URNS AUTO: >100 /HPF
WBC CLUMPS #/AREA URNS HPF: PRESENT /HPF

## 2022-05-09 PROCEDURE — 87086 URINE CULTURE/COLONY COUNT: CPT

## 2022-05-09 PROCEDURE — 83550 IRON BINDING TEST: CPT

## 2022-05-09 PROCEDURE — 82728 ASSAY OF FERRITIN: CPT

## 2022-05-09 PROCEDURE — 87186 SC STD MICRODIL/AGAR DIL: CPT

## 2022-05-09 PROCEDURE — 81001 URINALYSIS AUTO W/SCOPE: CPT

## 2022-05-09 PROCEDURE — 36415 COLL VENOUS BLD VENIPUNCTURE: CPT

## 2022-05-09 PROCEDURE — 85025 COMPLETE CBC W/AUTO DIFF WBC: CPT | Mod: QW

## 2022-05-09 PROCEDURE — 80053 COMPREHEN METABOLIC PANEL: CPT

## 2022-05-09 RX ORDER — SULFAMETHOXAZOLE/TRIMETHOPRIM 800-160 MG
1 TABLET ORAL 2 TIMES DAILY
Qty: 10 TABLET | Refills: 0 | Status: SHIPPED | OUTPATIENT
Start: 2022-05-09 | End: 2022-05-11 | Stop reason: ALTCHOICE

## 2022-05-09 NOTE — TELEPHONE ENCOUNTER
[facility] Call Center    Phone Message    May a detailed message be left on voicemail: yes     Reason for Call: Symptoms or Concerns     If patient has red-flag symptoms, warm transfer to triage line    Current symptom or concern: Last 3-4 days, patient feet, legs and hands have been swollen. Feeling dizzy.  No pain currently, when pain is there probably 3.  Patient took oxy this morning.    Symptoms have been present for:  3-4 day(s)    Has patient previously been seen for this? No    Are there any new or worsening symptoms? No      Action Taken: Message routed to:  [facility] Urology    Travel Screening: Not Applicable

## 2022-05-09 NOTE — TELEPHONE ENCOUNTER
Swelling to extremities started last week. Got worse over the weekend. Noted some pain with neuropathy. Pt denies taking any cardiac medications. Swelling to bilateral legs goes up to mid shins. No redness noted. Noted swelling to bilateral hands and wrists. Unable to put rings on. No SOB or chest pain.   Pt had recent UA/UC done. Noted positive nitrates in UA. Pt reports having increased urgency and frequency. Not straining to urinate. Has some pain with urination, feels like pressure discomfort. Pt is drinking at least 8 cups of water daily. No fever. Urine is cloudy and yellow. Chart and problems list reviewed.    Advised pt to contact primary provider in regards to swelling. Pt agrees to contact primary provider.     Jessica Wray RN MSN    Reason for Disposition    MODERATE swelling of both ankles (e.g., swelling extends up to the knees) AND new onset or worsening    Protocols used: LEG SWELLING AND EDEMA-A-OH

## 2022-05-09 NOTE — PROGRESS NOTES
Spoke to pt. Pt also reports that sharp pains started to lower back that radiates to right side. Rated 5-6/10. Pain also radiates down to groin area. This started last week. Pt denies history of kidney stones. Pt has been taking oxycodone for relief. Pt has CT scan scheduled on 5/17. Advised to wait for those results.     Confirmed with Dr. Velez that Bactrim is appropriate treatment.     Jessica Hutchinson RN MSN    Signed Prescriptions:                        Disp   Refills    sulfamethoxazole-trimethoprim (BACTRIM DS)*10 tab*0        Sig: Take 1 tablet by mouth 2 times daily  Authorizing Provider: FROYLAN VELEZ SEE HILARIO  Ordering User: JESSICA HUTCHINSON

## 2022-05-09 NOTE — PROGRESS NOTES
Cailin Velez MD Bratsch, Angie J RN  Caller: Unspecified (Today, 12:01 PM)  If she has bad symptoms she can be empirically treated.  If minimal symptoms would wait until the urine culture returns     Attempted to call pt. Left detailed message to call clinic back at [phone].     Jessica Wray, RN MSN

## 2022-05-09 NOTE — TELEPHONE ENCOUNTER
[facility] Call Center    Phone Message    May a detailed message be left on voicemail: yes     Reason for Call: Patient had labs done today at [facility].  Per patient, if Dr. Velez is to prescribe an antibiotic, please send the medication to:  [facility] [address]  Phone - [phone]    Please reach out to patient when and if this medication is sent out.      Action Taken: Message routed to:  [facility] Urology    Travel Screening: Not Applicable

## 2022-05-11 RX ORDER — CIPROFLOXACIN 500 MG/1
500 TABLET, FILM COATED ORAL 2 TIMES DAILY
Qty: 10 TABLET | Refills: 0 | Status: SHIPPED | OUTPATIENT
Start: 2022-05-11 | End: 2022-06-27

## 2022-05-11 NOTE — PROGRESS NOTES
Spoke to pt. Reviewed UC results and noted bacteria is resistent to current treatment of Bactrim. Pt reports her symptoms have persisted and not changed. Informed her of the need to change treatment. Pt verbalized understanding.   Noted drug interactions with tizanidine. Pt denies currently taking this medication.   Order sent per protocol.     Jessica Hutchinson, RN MSN    Signed Prescriptions:                        Disp   Refills    ciprofloxacin (CIPRO) 500 MG tablet        10 tab*0        Sig: Take 1 tablet (500 mg) by mouth 2 times daily  Authorizing Provider: FROYLAN ARTEAGA SEE HILARIO  Ordering User: JESSICA HUTCHINSON

## 2022-05-12 LAB — BACTERIA UR CULT: ABNORMAL

## 2022-05-17 ENCOUNTER — LAB (OUTPATIENT)
Dept: LAB | Facility: CLINIC | Age: 57
End: 2022-05-17
Attending: INTERNAL MEDICINE
Payer: MEDICARE

## 2022-05-17 ENCOUNTER — ANCILLARY PROCEDURE (OUTPATIENT)
Dept: CT IMAGING | Facility: CLINIC | Age: 57
End: 2022-05-17
Attending: INTERNAL MEDICINE
Payer: MEDICARE

## 2022-05-17 DIAGNOSIS — Z95.828 PORT-A-CATH IN PLACE: Primary | ICD-10-CM

## 2022-05-17 DIAGNOSIS — D50.9 IRON DEFICIENCY ANEMIA, UNSPECIFIED IRON DEFICIENCY ANEMIA TYPE: ICD-10-CM

## 2022-05-17 DIAGNOSIS — C16.8 MALIGNANT NEOPLASM OF OVERLAPPING SITES OF STOMACH (H): ICD-10-CM

## 2022-05-17 PROCEDURE — 71260 CT THORAX DX C+: CPT | Performed by: RADIOLOGY

## 2022-05-17 PROCEDURE — 250N000011 HC RX IP 250 OP 636: Performed by: INTERNAL MEDICINE

## 2022-05-17 PROCEDURE — 96523 IRRIG DRUG DELIVERY DEVICE: CPT

## 2022-05-17 PROCEDURE — 74177 CT ABD & PELVIS W/CONTRAST: CPT | Performed by: RADIOLOGY

## 2022-05-17 RX ORDER — HEPARIN SODIUM (PORCINE) LOCK FLUSH IV SOLN 100 UNIT/ML 100 UNIT/ML
500 SOLUTION INTRAVENOUS ONCE
Status: COMPLETED | OUTPATIENT
Start: 2022-05-17 | End: 2022-05-17

## 2022-05-17 RX ORDER — HEPARIN SODIUM (PORCINE) LOCK FLUSH IV SOLN 100 UNIT/ML 100 UNIT/ML
5 SOLUTION INTRAVENOUS
Status: DISCONTINUED | OUTPATIENT
Start: 2022-05-17 | End: 2022-05-23 | Stop reason: HOSPADM

## 2022-05-17 RX ORDER — IOPAMIDOL 755 MG/ML
104 INJECTION, SOLUTION INTRAVASCULAR ONCE
Status: COMPLETED | OUTPATIENT
Start: 2022-05-17 | End: 2022-05-17

## 2022-05-17 RX ADMIN — HEPARIN SODIUM (PORCINE) LOCK FLUSH IV SOLN 100 UNIT/ML 500 UNITS: 100 SOLUTION at 11:17

## 2022-05-17 RX ADMIN — IOPAMIDOL 104 ML: 755 INJECTION, SOLUTION INTRAVASCULAR at 10:59

## 2022-05-17 RX ADMIN — Medication 5 ML: at 09:47

## 2022-05-17 NOTE — NURSING NOTE
Chief Complaint   Patient presents with     Vascular Access Problem     Port Flush     Pt here for Port-a-Cath access and flush only. No labs drawn. Port needle left in place for CT Scan today.      Tessie Rader RN

## 2022-05-19 ENCOUNTER — ONCOLOGY VISIT (OUTPATIENT)
Dept: ONCOLOGY | Facility: CLINIC | Age: 57
End: 2022-05-19
Attending: INTERNAL MEDICINE
Payer: MEDICARE

## 2022-05-19 VITALS
OXYGEN SATURATION: 98 % | BODY MASS INDEX: 26.01 KG/M2 | SYSTOLIC BLOOD PRESSURE: 100 MMHG | TEMPERATURE: 98.3 F | HEIGHT: 68 IN | WEIGHT: 171.6 LBS | HEART RATE: 81 BPM | DIASTOLIC BLOOD PRESSURE: 64 MMHG

## 2022-05-19 DIAGNOSIS — D64.9 ANEMIA, UNSPECIFIED TYPE: Primary | ICD-10-CM

## 2022-05-19 DIAGNOSIS — C16.8 MALIGNANT NEOPLASM OF OVERLAPPING SITES OF STOMACH (H): ICD-10-CM

## 2022-05-19 LAB
BASOPHILS # BLD AUTO: 0.1 10E3/UL (ref 0–0.2)
BASOPHILS NFR BLD AUTO: 1 %
EOSINOPHIL # BLD AUTO: 0.1 10E3/UL (ref 0–0.7)
EOSINOPHIL NFR BLD AUTO: 1 %
ERYTHROCYTE [DISTWIDTH] IN BLOOD BY AUTOMATED COUNT: 12.9 % (ref 10–15)
FOLATE SERPL-MCNC: 16.5 NG/ML
HCT VFR BLD AUTO: 35.7 % (ref 35–47)
HGB BLD-MCNC: 11.6 G/DL (ref 11.7–15.7)
IMM GRANULOCYTES # BLD: 0 10E3/UL
IMM GRANULOCYTES NFR BLD: 0 %
LYMPHOCYTES # BLD AUTO: 2 10E3/UL (ref 0.8–5.3)
LYMPHOCYTES NFR BLD AUTO: 29 %
MCH RBC QN AUTO: 30.4 PG (ref 26.5–33)
MCHC RBC AUTO-ENTMCNC: 32.5 G/DL (ref 31.5–36.5)
MCV RBC AUTO: 94 FL (ref 78–100)
MONOCYTES # BLD AUTO: 0.5 10E3/UL (ref 0–1.3)
MONOCYTES NFR BLD AUTO: 7 %
NEUTROPHILS # BLD AUTO: 4.2 10E3/UL (ref 1.6–8.3)
NEUTROPHILS NFR BLD AUTO: 62 %
NRBC # BLD AUTO: 0 10E3/UL
NRBC BLD AUTO-RTO: 0 /100
PLATELET # BLD AUTO: 269 10E3/UL (ref 150–450)
RBC # BLD AUTO: 3.82 10E6/UL (ref 3.8–5.2)
RETICS # AUTO: 0.08 10E6/UL (ref 0.03–0.1)
RETICS/RBC NFR AUTO: 2 % (ref 0.5–2)
VIT B12 SERPL-MCNC: 887 PG/ML (ref 193–986)
WBC # BLD AUTO: 6.8 10E3/UL (ref 4–11)

## 2022-05-19 PROCEDURE — 82668 ASSAY OF ERYTHROPOIETIN: CPT | Performed by: INTERNAL MEDICINE

## 2022-05-19 PROCEDURE — 82746 ASSAY OF FOLIC ACID SERUM: CPT | Performed by: INTERNAL MEDICINE

## 2022-05-19 PROCEDURE — 36591 DRAW BLOOD OFF VENOUS DEVICE: CPT | Performed by: INTERNAL MEDICINE

## 2022-05-19 PROCEDURE — 85049 AUTOMATED PLATELET COUNT: CPT | Performed by: INTERNAL MEDICINE

## 2022-05-19 PROCEDURE — 99215 OFFICE O/P EST HI 40 MIN: CPT | Performed by: INTERNAL MEDICINE

## 2022-05-19 PROCEDURE — 82607 VITAMIN B-12: CPT | Performed by: INTERNAL MEDICINE

## 2022-05-19 PROCEDURE — 85045 AUTOMATED RETICULOCYTE COUNT: CPT | Performed by: INTERNAL MEDICINE

## 2022-05-19 PROCEDURE — G0463 HOSPITAL OUTPT CLINIC VISIT: HCPCS

## 2022-05-19 PROCEDURE — 250N000011 HC RX IP 250 OP 636: Performed by: INTERNAL MEDICINE

## 2022-05-19 PROCEDURE — 85014 HEMATOCRIT: CPT | Performed by: INTERNAL MEDICINE

## 2022-05-19 RX ORDER — HEPARIN SODIUM (PORCINE) LOCK FLUSH IV SOLN 100 UNIT/ML 100 UNIT/ML
5 SOLUTION INTRAVENOUS ONCE
Status: COMPLETED | OUTPATIENT
Start: 2022-05-19 | End: 2022-05-19

## 2022-05-19 RX ADMIN — SODIUM CHLORIDE, PRESERVATIVE FREE 5 ML: 5 INJECTION INTRAVENOUS at 11:14

## 2022-05-19 ASSESSMENT — PAIN SCALES - GENERAL: PAINLEVEL: MILD PAIN (3)

## 2022-05-19 NOTE — PROGRESS NOTES
Oncology/Hematology Visit Note  May 19, 2022    Reason for Visit: Follow up of gastric adenocarcinoma.    History of Present Illness:     Please see previous notes for detail. I have copied and updated from prior notes.    Ms. Bashir is a 56 year old female with a history of gastric adenocarcinoma.  She was being followed for a pancreatic duct dilatation and pancreatic cyst which was noted in November 2018.  Since May 2019 she started noticing some nausea and vomiting and occasional abdominal discomfort.  She had an ultrasound in August 2019 which was essentially unremarkable.  She had a repeat endoscopic ultrasound on 10/29/2019 which showed increase in size of the cyst as well as dilation of the main pancreatic duct.  FNA and fluid analysis showed mucinous epithelium.  She was referred to Dr. 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  Surprisingly there was no pancreatic ductal neoplasm seen but on the resected specimen stomach cancer was noted.  It was poorly differentiated adenocarcinoma with poorly cohesive/signet ring cell type with proximal gastric mucosal and serosal margins being positive.  There was lymphovascular and perineural invasion seen.  22 lymph nodes were sampled and all were benign.  It was a pT4aN0 lesion.  HER-2/christine FISH was not amplified.         EUS on 3/3/2020 without clear evidence of neoplasm endoscopically. Left gastric lymph node fine-needle aspiration was negative for carcinoma. Biopsy from the gastric jejunal anastomosis as well as lesser curvature of the stomach also did not show any malignancy.     She had a PET/CT on 3/13/2020 which showed soft tissue streaky density with increased FDG uptake adjacent to the pancreaticojejunostomy which could be neoplastic in origin.  There is mild increased FDG uptake in the gastric remnant.  Focal area of soft tissue thickening with fatty streakiness along medial laparotomy with increased FDG uptake which  likely is inflammatory. Increased FDG uptake in thoracic esophagus which could be esophagitis.      She started on chemotherapy with 5FU, oxaliplatin, and Taxotere on 3/27/20. She was seen on 4/7/20 for evaluation of fevers. She was treated for possible pneumonia with Levaquin.      She was seen in clinic 4/13/20 for RUQ pain,CT and labs reassuring. She received cycles 2 and 3 of chemotherapy on 4/20/20 and 5/4/20. She was admitted from 5/21-5/23/20 with neutropenic aspiration pneumonia. She received cycle 4 on 5/27/20 with Neulasta support. PET/CT on 6/3/20 showed mild residual uptake at the site of gastrojejunal anastomosis which likely is physiologic.  There is almost resolution of soft tissue nodule next to the pancreaticoduodenal anastomosis without FDG uptake.  There is focal increased uptake in the left posterior acetabulum which could be artifact.     Decreased dose of oxaliplatin due to neuropathy/cold sensitivity on 6/11. Decreased 5FU to 85% with cycle #6 due to mucositis/diarrhea/taste changes. Treatment was held 7/9/20 due to diarrhea, dehydration, tachycardia, rash, and weight loss and she got cycle 7 on 7/15. On 7/20 she presented to the ED with RUQ abdominal pain, and was admitted 7/20-7/29 for acute cholangitis     She received C#8 FLOT on 8/14/2020.    PET/CT on 9/2/2020 did not show evidence of disease.  Small peritoneal nodule was a stable.  This was most consistent with fat necrosis.    CT chest abdomen and pelvis on 9/23/2020 also does not show evidence of malignancy recurrence and the peritoneal nodule in the right upper quadrant is stable.  Before definite surgery she had diagnostic laparoscopy which was negative for evidence of malignancy so on 10/21/2020 she had exploratory laparotomy with extensive lysis of additions, partial omentectomy, resection of Gastrojejunal Anastomosis w/ En Bloc Subtotal Gastrectomy and modified D2 Lymphadenectomy with David-en-Y Gastrojejunostomy Reconstruction.   Final pathology showed Focal residual poorly differentiated adenocarcinoma (0.4cm) with signet-ring morphology status    post-neoadjuvant chemotherapy and all 9 lymph nodes were benign.  All margins were negative.  It was a pT1bN0 lesion.    Before definite surgery she had diagnostic laparoscopy which was negative for evidence of malignancy so on 10/21/2020 she had exploratory laparotomy with extensive lysis of additions, partial omentectomy, resection of Gastrojejunal Anastomosis w/ En Bloc Subtotal Gastrectomy and modified D2 Lymphadenectomy with David-en-Y Gastrojejunostomy Reconstruction.  Final pathology showed Focal residual poorly differentiated adenocarcinoma (0.4cm) with signet-ring morphology status    post-neoadjuvant chemotherapy and all 9 lymph nodes were benign.  All margins were negative.  It was a [id number] lesion.    She was admitted from 11/4/2020-11/6/2020 for nausea vomiting and left upper quadrant pain and CT scan showed fluid collection in the left upper quadrant, likely hematoma.  Upper GI was negative for leak.  No drainage was recommended by IR.  He was discharged on 7 days of levofloxacin and Flagyl.      11/20/2020.  CT abdomen and pelvis showed postsurgical changes of Whipple and subtotal gastrectomy with David-en-Y reconstruction.  There is resolution of previously demonstrated fluid collection between the stomach and the spleen and decrease in sigmoid thickening.  Small left pleural effusion with atelectasis adjacent to it.    12/7/2020.  EGD showed healthy-appearing and generous pouch (remaining the stomach) with widely patent end to side gastrojejunostomy, though with the David/enteral limb mildly stenostic and tortuous at its origin. Therefore a covered metal stent was placed from stomach to jejunum across the stenotic region, resulting in straightening of the course.    2/8/2021.  EGD was done and stent was removed.    5/21/2021.  CT chest abdomen and pelvis does not show evidence of  recurrence of malignancy.  11/15/2021-CT chest abdomen and pelvis without evidence of recurrence.  5/17/2022.  CT chest abdomen and pelvis without evidence of recurrence.      Interval History:  Overall she is doing well.  She denies any abdominal pain.  Has occasional upset stomach but overall feeling good and is able to swallow well.  Eating well.  No weight loss.  No diarrhea constipation or bleeding.  No fevers or difficulty breathing.  Feels a little tired but remains decently functional.  She has neuropathy with numbness in her feet and up to the knees.  She also gets charley horses.  She also notices neuropathy pain and numbness in the hands.  She is taking duloxetine and oxycodone.  She has looked into laser therapy but has not tried that as of yet.  She is following with palliative care.    ECOG 1    ROS:  Otherwise a comprehensive review of the system was unremarkable.    I reviewed other history in epic as below.      Current Outpatient Medications   Medication Sig Dispense Refill     amitriptyline (ELAVIL) 100 MG tablet Take 1 tablet (100 mg) by mouth At Bedtime 30 tablet 3     calcium carbonate 500 mg, elemental, (OSCAL 500) 1250 (500 Ca) MG TABS tablet Take by mouth 3 times daily       ciprofloxacin (CIPRO) 500 MG tablet Take 1 tablet (500 mg) by mouth 2 times daily 10 tablet 0     Cyanocobalamin 1000 MCG CAPS Take by mouth daily       cyclobenzaprine (FLEXERIL) 10 MG tablet TAKE 1 TABLET BY MOUTH 1-3 TIMES DAILY       diclofenac (FLECTOR) 1.3 % patch apply 1 patch to affected area twice daily       DULoxetine (CYMBALTA) 30 MG capsule Take 3 capsules (90 mg) by mouth daily 90 capsule 2     famotidine (PEPCID) 20 MG tablet Take 20 mg by mouth 2 times daily as needed        fluticasone (FLONASE) 50 MCG/ACT nasal spray Spray 1 spray into both nostrils daily       hydrOXYzine (VISTARIL) 25 MG capsule Take 25-50 mg by mouth 4 times daily as needed for itching       loperamide (IMODIUM) 2 MG capsule Take 4  "mg by mouth 4 times daily as needed for diarrhea       loratadine-pseudoePHEDrine (CLARITIN-D 24-HOUR)  MG 24 hr tablet Take 1 tablet by mouth daily as needed        magic mouthwash (ENTER INGREDIENTS IN COMMENTS) suspension Take 5 mLs by mouth       magnesium 250 MG tablet Take 1 tablet by mouth daily       minocycline (MINOCIN) 100 MG capsule Take 1 capsule by mouth 2 times daily       multivitamin w/minerals (THERA-VIT-M) tablet Take 1 tablet by mouth daily       ondansetron (ZOFRAN) 8 MG tablet Take 1 tablet (8 mg) by mouth every 8 hours as needed (Nausea/Vomiting) 90 tablet 2     oxyCODONE (ROXICODONE) 5 MG tablet Take 1 tablet (5 mg) by mouth 4 times daily as needed for moderate to severe pain Maximum 4 pills/day. 120 tablet 0     pantoprazole (PROTONIX) 40 MG EC tablet TAKE 1 TABLET BY MOUTH EVERY DAY 90 tablet 1     sennosides (SENOKOT) 8.6 MG tablet 1-2 tablets 1-2 times daily as needed. 120 tablet 1     simethicone 80 MG TABS Take 1 tablet by mouth every 6 hours as needed (bloating, gas, belching) 90 tablet 3     SUMAtriptan (IMITREX) 100 MG tablet Take 100 mg by mouth at onset of headache for migraine       tiZANidine (ZANAFLEX) 4 MG capsule Take 1 capsule by mouth every 8 hours         Physical Examination:    /64   Pulse 81   Temp 98.3  F (36.8  C) (Oral)   Ht 1.729 m (5' 8.07\")   Wt 77.8 kg (171 lb 9.6 oz)   LMP 09/23/2014   SpO2 98%   BMI 26.04 kg/m    Wt Readings from Last 10 Encounters:   05/19/22 77.8 kg (171 lb 9.6 oz)   04/28/22 77.1 kg (170 lb)   03/15/22 80.6 kg (177 lb 9.6 oz)   11/27/21 70.8 kg (156 lb)   03/22/21 71 kg (156 lb 8 oz)   02/08/21 64.7 kg (142 lb 9.6 oz)   02/04/21 66.2 kg (146 lb)   12/07/20 64.6 kg (142 lb 8 oz)   11/20/20 70.8 kg (156 lb 1.6 oz)   11/04/20 72.6 kg (160 lb)       CONSTITUTIONAL: no acute distress  EYES: PERRLA, no palor or icterus.   ENT/MOUTH: no mouth lesions. Ears normal  CVS: s1s2 no m r g .   RESPIRATORY: clear to auscultation b/l  GI: " soft non tender no hepatosplenomegaly.  Surgical scars are well-healed.  NEURO: AAOX3  Grossly non focal neuro exam apart from subjective neuropathy in her hands and legs.  INTEGUMENT: no obvious rashes  LYMPHATIC: no palpable cervical, supraclavicular, axillary or inguinal LAD  MUSCULOSKELETAL: Unremarkable. No bony tenderness.   EXTREMITIES: no edema  PSYCH: Mentation, mood and affect are normal. Decision making capacity is intact      Laboratory Data:     Reviewed  5/9/2022.    CBC shows WBC 4.  Hemoglobin 10.5.  Hematocrit 32.5.  Platelets 222.  MCV 96.    CMP shows normal kidney functions.  Albumin 3.2.  Normal LFTs.    Ferritin 149.  Iron 48.  TIBC 246.  Iron saturation index 20%.        5/17/2021.  CT chest abdomen and pelvis shows postoperative changes of partial gastrectomy without evidence of local recurrence or metastatic disease.    There were multiple small lung nodules which have remained stable.        2/8/2021.  EGD showed David en Y anatomy with generous pouch and stent bridging the GJ and proximal David   The stent was removed and a relook endoscopies showed widely patent prioximal David and associated superficial ulceration          Assessment and Plan:    Gastric adenocarcinona, HER-2 negative.   - Started on neoadjuvant FLOT on 3/7/2020. She has required a dose reduction of her oxaliplatin by 30% due to cold sensitivity/neuropathy with cycle #5. 5FU was decreased to 85% with cycle #6 due to mucositis/diarrhea/taste changes.   Received C#8 on 8/14/2020.  PET/CT on 9/2/2020 did not show evidence of disease.  Small peritoneal nodule was stable.  This was most consistent with fat necrosis.    CT chest abdomen and pelvis on 9/23/2020 also does not show evidence of malignancy recurrence and the peritoneal nodule in the right upper quadrant is stable.    Before definite surgery she had diagnostic laparoscopy which was negative for evidence of malignancy so on 10/21/2020 she had exploratory laparotomy with  extensive lysis of additions, partial omentectomy, resection of Gastrojejunal Anastomosis w/ En Bloc Subtotal Gastrectomy and modified D2 Lymphadenectomy with David-en-Y Gastrojejunostomy Reconstruction.  Final pathology showed Focal residual poorly differentiated adenocarcinoma (0.4cm) with signet-ring morphology status    post-neoadjuvant chemotherapy and all 9 lymph nodes were benign.  All margins were negative.  It was a [id number] lesion.    We decided to keep her on observation as she had received 6 months of neoadjuvant chemotherapy and then the definite surgery.      Doing well. No evidence of recurrence.  Repeat CT CAP in 6 months.  We will repeat EGD on an as needed basis    She was followed in cancer survivorship program.      Anemia.   She had iron deficiency anemia and she received INFeD on 10/16/2020.  Anemia resolved and now iron studies are good but she has mild anemia again.  I will check smear, B12 and folate. Cont to take B12.       Neuropathy.  She has chemotherapy related neuropathy.  In the past she tried acupuncture but it upset her nerve so she stopped it. She was intolerant to gabapentin and pregabalin. She is taking duloxetine and QID oxycodone. She is following with Palliative care.   We again discussed that she can try laser therapy and look into it.      Port-A-Cath.  She will need port flushes every 8 weeks or so.     We did not address the following today.    Abdominal pain.  This improved after EGD in December 2020 with a covered metal stent was placed from stomach to jejunum across the stenotic region of David/enteral limb.  The stent was removed in February 2021.     Recurrent UTI.  I reviewed notes from Dr. Velez from urology.  She had a cystoscopy on 4/28/2022 which was unremarkable.        Return to clinic in 6 months with labs/CT scan prior.    All questions answered and she is agreeable with the plan.    Magaly Pollard MD

## 2022-05-19 NOTE — NURSING NOTE
"Oncology Rooming Note    May 19, 2022 10:30 AM   Nathalie Bashir is a 56 year old female who presents for:    Chief Complaint   Patient presents with     Oncology Clinic Visit     Gastric cancer     Initial Vitals: /64   Pulse 81   Temp 98.3  F (36.8  C) (Oral)   Ht 1.729 m (5' 8.07\")   Wt 77.8 kg (171 lb 9.6 oz)   LMP 09/23/2014   SpO2 98%   BMI 26.04 kg/m   Estimated body mass index is 26.04 kg/m  as calculated from the following:    Height as of this encounter: 1.729 m (5' 8.07\").    Weight as of this encounter: 77.8 kg (171 lb 9.6 oz). Body surface area is 1.93 meters squared.  Mild Pain (3) Comment: Data Unavailable   Patient's last menstrual period was 09/23/2014.  Allergies reviewed: Yes  Medications reviewed: Yes    Medications: Medication refills not needed today.  Pharmacy name entered into [facility]:    [facility] - [place], [place] - [address] [place] [address] [phone]  [facility] [id number] [place] [facility] - [place], [place] - [address] [place] [address] DRUG STORE #[id number] [facility] [address] [facility] [address] [facility] [address]    Clinical concerns: none       Starla Cook            "

## 2022-05-19 NOTE — PATIENT INSTRUCTIONS
Labs today    Port flushes every 8 weeks or so    In 6 months, repeat labs and CT and see me after that

## 2022-05-19 NOTE — NURSING NOTE
"Chief Complaint   Patient presents with     Oncology Clinic Visit     Gastric cancer     Port accessed with 20g 3/4\" power needle and labs drawn by rn.  Port flushed with NS and heparin.  Pt tolerated well.  De-accessed.    Mynor Higgins RN  "

## 2022-05-19 NOTE — LETTER
5/19/2022         RE: Nathalie Bashir  [address] [facility] [address]        Dear Colleague,    Thank you for referring your patient, Nathalie Bashir, to the [facility]. Please see a copy of my visit note below.    Oncology/Hematology Visit Note  May 19, 2022    Reason for Visit: Follow up of gastric adenocarcinoma.    History of Present Illness:     Please see previous notes for detail. I have copied and updated from prior notes.    Ms. Bashir is a 56 year old female with a history of gastric adenocarcinoma.  She was being followed for a pancreatic duct dilatation and pancreatic cyst which was noted in November 2018.  Since May 2019 she started noticing some nausea and vomiting and occasional abdominal discomfort.  She had an ultrasound in August 2019 which was essentially unremarkable.  She had a repeat endoscopic ultrasound on 10/29/2019 which showed increase in size of the cyst as well as dilation of the main pancreatic duct.  FNA and fluid analysis showed mucinous epithelium.  She was referred to Dr. 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  Surprisingly there was no pancreatic ductal neoplasm seen but on the resected specimen stomach cancer was noted.  It was poorly differentiated adenocarcinoma with poorly cohesive/signet ring cell type with proximal gastric mucosal and serosal margins being positive.  There was lymphovascular and perineural invasion seen.  22 lymph nodes were sampled and all were benign.  It was a pT4aN0 lesion.  HER-2/christine FISH was not amplified.         EUS on 3/3/2020 without clear evidence of neoplasm endoscopically. Left gastric lymph node fine-needle aspiration was negative for carcinoma. Biopsy from the gastric jejunal anastomosis as well as lesser curvature of the stomach also did not show any malignancy.     She had a PET/CT on 3/13/2020 which showed soft tissue streaky density with increased FDG uptake  adjacent to the pancreaticojejunostomy which could be neoplastic in origin.  There is mild increased FDG uptake in the gastric remnant.  Focal area of soft tissue thickening with fatty streakiness along medial laparotomy with increased FDG uptake which likely is inflammatory. Increased FDG uptake in thoracic esophagus which could be esophagitis.      She started on chemotherapy with 5FU, oxaliplatin, and Taxotere on 3/27/20. She was seen on 4/7/20 for evaluation of fevers. She was treated for possible pneumonia with Levaquin.      She was seen in clinic 4/13/20 for RUQ pain,CT and labs reassuring. She received cycles 2 and 3 of chemotherapy on 4/20/20 and 5/4/20. She was admitted from 5/21-5/23/20 with neutropenic aspiration pneumonia. She received cycle 4 on 5/27/20 with Neulasta support. PET/CT on 6/3/20 showed mild residual uptake at the site of gastrojejunal anastomosis which likely is physiologic.  There is almost resolution of soft tissue nodule next to the pancreaticoduodenal anastomosis without FDG uptake.  There is focal increased uptake in the left posterior acetabulum which could be artifact.     Decreased dose of oxaliplatin due to neuropathy/cold sensitivity on 6/11. Decreased 5FU to 85% with cycle #6 due to mucositis/diarrhea/taste changes. Treatment was held 7/9/20 due to diarrhea, dehydration, tachycardia, rash, and weight loss and she got cycle 7 on 7/15. On 7/20 she presented to the ED with RUQ abdominal pain, and was admitted 7/20-7/29 for acute cholangitis     She received C#8 FLOT on 8/14/2020.    PET/CT on 9/2/2020 did not show evidence of disease.  Small peritoneal nodule was a stable.  This was most consistent with fat necrosis.    CT chest abdomen and pelvis on 9/23/2020 also does not show evidence of malignancy recurrence and the peritoneal nodule in the right upper quadrant is stable.  Before definite surgery she had diagnostic laparoscopy which was negative for evidence of malignancy so  on 10/21/2020 she had exploratory laparotomy with extensive lysis of additions, partial omentectomy, resection of Gastrojejunal Anastomosis w/ En Bloc Subtotal Gastrectomy and modified D2 Lymphadenectomy with David-en-Y Gastrojejunostomy Reconstruction.  Final pathology showed Focal residual poorly differentiated adenocarcinoma (0.4cm) with signet-ring morphology status    post-neoadjuvant chemotherapy and all 9 lymph nodes were benign.  All margins were negative.  It was a pT1bN0 lesion.    Before definite surgery she had diagnostic laparoscopy which was negative for evidence of malignancy so on 10/21/2020 she had exploratory laparotomy with extensive lysis of additions, partial omentectomy, resection of Gastrojejunal Anastomosis w/ En Bloc Subtotal Gastrectomy and modified D2 Lymphadenectomy with David-en-Y Gastrojejunostomy Reconstruction.  Final pathology showed Focal residual poorly differentiated adenocarcinoma (0.4cm) with signet-ring morphology status    post-neoadjuvant chemotherapy and all 9 lymph nodes were benign.  All margins were negative.  It was a foB8pY8 lesion.    She was admitted from 11/4/2020-11/6/2020 for nausea vomiting and left upper quadrant pain and CT scan showed fluid collection in the left upper quadrant, likely hematoma.  Upper GI was negative for leak.  No drainage was recommended by IR.  He was discharged on 7 days of levofloxacin and Flagyl.      11/20/2020.  CT abdomen and pelvis showed postsurgical changes of Whipple and subtotal gastrectomy with David-en-Y reconstruction.  There is resolution of previously demonstrated fluid collection between the stomach and the spleen and decrease in sigmoid thickening.  Small left pleural effusion with atelectasis adjacent to it.    12/7/2020.  EGD showed healthy-appearing and generous pouch (remaining the stomach) with widely patent end to side gastrojejunostomy, though with the David/enteral limb mildly stenostic and tortuous at its origin.  Therefore a covered metal stent was placed from stomach to jejunum across the stenotic region, resulting in straightening of the course.    2/8/2021.  EGD was done and stent was removed.    5/21/2021.  CT chest abdomen and pelvis does not show evidence of recurrence of malignancy.  11/15/2021-CT chest abdomen and pelvis without evidence of recurrence.  5/17/2022.  CT chest abdomen and pelvis without evidence of recurrence.      Interval History:  Overall she is doing well.  She denies any abdominal pain.  Has occasional upset stomach but overall feeling good and is able to swallow well.  Eating well.  No weight loss.  No diarrhea constipation or bleeding.  No fevers or difficulty breathing.  Feels a little tired but remains decently functional.  She has neuropathy with numbness in her feet and up to the knees.  She also gets charley horses.  She also notices neuropathy pain and numbness in the hands.  She is taking duloxetine and oxycodone.  She has looked into laser therapy but has not tried that as of yet.  She is following with palliative care.    ECOG 1    ROS:  Otherwise a comprehensive review of the system was unremarkable.    I reviewed other history in epic as below.      Current Outpatient Medications   Medication Sig Dispense Refill     amitriptyline (ELAVIL) 100 MG tablet Take 1 tablet (100 mg) by mouth At Bedtime 30 tablet 3     calcium carbonate 500 mg, elemental, (OSCAL 500) 1250 (500 Ca) MG TABS tablet Take by mouth 3 times daily       ciprofloxacin (CIPRO) 500 MG tablet Take 1 tablet (500 mg) by mouth 2 times daily 10 tablet 0     Cyanocobalamin 1000 MCG CAPS Take by mouth daily       cyclobenzaprine (FLEXERIL) 10 MG tablet TAKE 1 TABLET BY MOUTH 1-3 TIMES DAILY       diclofenac (FLECTOR) 1.3 % patch apply 1 patch to affected area twice daily       DULoxetine (CYMBALTA) 30 MG capsule Take 3 capsules (90 mg) by mouth daily 90 capsule 2     famotidine (PEPCID) 20 MG tablet Take 20 mg by mouth 2 times  "daily as needed        fluticasone (FLONASE) 50 MCG/ACT nasal spray Spray 1 spray into both nostrils daily       hydrOXYzine (VISTARIL) 25 MG capsule Take 25-50 mg by mouth 4 times daily as needed for itching       loperamide (IMODIUM) 2 MG capsule Take 4 mg by mouth 4 times daily as needed for diarrhea       loratadine-pseudoePHEDrine (CLARITIN-D 24-HOUR)  MG 24 hr tablet Take 1 tablet by mouth daily as needed        magic mouthwash (ENTER INGREDIENTS IN COMMENTS) suspension Take 5 mLs by mouth       magnesium 250 MG tablet Take 1 tablet by mouth daily       minocycline (MINOCIN) 100 MG capsule Take 1 capsule by mouth 2 times daily       multivitamin w/minerals (THERA-VIT-M) tablet Take 1 tablet by mouth daily       ondansetron (ZOFRAN) 8 MG tablet Take 1 tablet (8 mg) by mouth every 8 hours as needed (Nausea/Vomiting) 90 tablet 2     oxyCODONE (ROXICODONE) 5 MG tablet Take 1 tablet (5 mg) by mouth 4 times daily as needed for moderate to severe pain Maximum 4 pills/day. 120 tablet 0     pantoprazole (PROTONIX) 40 MG EC tablet TAKE 1 TABLET BY MOUTH EVERY DAY 90 tablet 1     sennosides (SENOKOT) 8.6 MG tablet 1-2 tablets 1-2 times daily as needed. 120 tablet 1     simethicone 80 MG TABS Take 1 tablet by mouth every 6 hours as needed (bloating, gas, belching) 90 tablet 3     SUMAtriptan (IMITREX) 100 MG tablet Take 100 mg by mouth at onset of headache for migraine       tiZANidine (ZANAFLEX) 4 MG capsule Take 1 capsule by mouth every 8 hours         Physical Examination:    /64   Pulse 81   Temp 98.3  F (36.8  C) (Oral)   Ht 1.729 m (5' 8.07\")   Wt 77.8 kg (171 lb 9.6 oz)   LMP 09/23/2014   SpO2 98%   BMI 26.04 kg/m    Wt Readings from Last 10 Encounters:   05/19/22 77.8 kg (171 lb 9.6 oz)   04/28/22 77.1 kg (170 lb)   03/15/22 80.6 kg (177 lb 9.6 oz)   11/27/21 70.8 kg (156 lb)   03/22/21 71 kg (156 lb 8 oz)   02/08/21 64.7 kg (142 lb 9.6 oz)   02/04/21 66.2 kg (146 lb)   12/07/20 64.6 kg (142 lb " 8 oz)   11/20/20 70.8 kg (156 lb 1.6 oz)   11/04/20 72.6 kg (160 lb)       CONSTITUTIONAL: no acute distress  EYES: PERRLA, no palor or icterus.   ENT/MOUTH: no mouth lesions. Ears normal  CVS: s1s2 no m r g .   RESPIRATORY: clear to auscultation b/l  GI: soft non tender no hepatosplenomegaly.  Surgical scars are well-healed.  NEURO: AAOX3  Grossly non focal neuro exam apart from subjective neuropathy in her hands and legs.  INTEGUMENT: no obvious rashes  LYMPHATIC: no palpable cervical, supraclavicular, axillary or inguinal LAD  MUSCULOSKELETAL: Unremarkable. No bony tenderness.   EXTREMITIES: no edema  PSYCH: Mentation, mood and affect are normal. Decision making capacity is intact      Laboratory Data:     Reviewed  5/9/2022.    CBC shows WBC 4.  Hemoglobin 10.5.  Hematocrit 32.5.  Platelets 222.  MCV 96.    CMP shows normal kidney functions.  Albumin 3.2.  Normal LFTs.    Ferritin 149.  Iron 48.  TIBC 246.  Iron saturation index 20%.        5/17/2021.  CT chest abdomen and pelvis shows postoperative changes of partial gastrectomy without evidence of local recurrence or metastatic disease.    There were multiple small lung nodules which have remained stable.        2/8/2021.  EGD showed David en Y anatomy with generous pouch and stent bridging the GJ and proximal David   The stent was removed and a relook endoscopies showed widely patent prioximal David and associated superficial ulceration          Assessment and Plan:    Gastric adenocarcinona, HER-2 negative.   - Started on neoadjuvant FLOT on 3/7/2020. She has required a dose reduction of her oxaliplatin by 30% due to cold sensitivity/neuropathy with cycle #5. 5FU was decreased to 85% with cycle #6 due to mucositis/diarrhea/taste changes.   Received C#8 on 8/14/2020.  PET/CT on 9/2/2020 did not show evidence of disease.  Small peritoneal nodule was stable.  This was most consistent with fat necrosis.    CT chest abdomen and pelvis on 9/23/2020 also does not show  evidence of malignancy recurrence and the peritoneal nodule in the right upper quadrant is stable.    Before definite surgery she had diagnostic laparoscopy which was negative for evidence of malignancy so on 10/21/2020 she had exploratory laparotomy with extensive lysis of additions, partial omentectomy, resection of Gastrojejunal Anastomosis w/ En Bloc Subtotal Gastrectomy and modified D2 Lymphadenectomy with David-en-Y Gastrojejunostomy Reconstruction.  Final pathology showed Focal residual poorly differentiated adenocarcinoma (0.4cm) with signet-ring morphology status    post-neoadjuvant chemotherapy and all 9 lymph nodes were benign.  All margins were negative.  It was a [id number] lesion.    We decided to keep her on observation as she had received 6 months of neoadjuvant chemotherapy and then the definite surgery.      Doing well. No evidence of recurrence.  Repeat CT CAP in 6 months.  We will repeat EGD on an as needed basis    She was followed in cancer survivorship program.      Anemia.   She had iron deficiency anemia and she received INFeD on 10/16/2020.  Anemia resolved and now iron studies are good but she has mild anemia again.  I will check smear, B12 and folate. Cont to take B12.       Neuropathy.  She has chemotherapy related neuropathy.  In the past she tried acupuncture but it upset her nerve so she stopped it. She was intolerant to gabapentin and pregabalin. She is taking duloxetine and QID oxycodone. She is following with Palliative care.   We again discussed that she can try laser therapy and look into it.      Port-A-Cath.  She will need port flushes every 8 weeks or so.     We did not address the following today.    Abdominal pain.  This improved after EGD in December 2020 with a covered metal stent was placed from stomach to jejunum across the stenotic region of David/enteral limb.  The stent was removed in February 2021.     Recurrent UTI.  I reviewed notes from Dr. Velez from urology.  She had a  cystoscopy on 4/28/2022 which was unremarkable.      Return to clinic in 6 months with labs/CT scan prior.    All questions answered and she is agreeable with the plan.          Again, thank you for allowing me to participate in the care of your patient.      Sincerely,    Magaly Pollard MD

## 2022-05-20 LAB
EPO SERPL-ACNC: 9 MU/ML
PATH REPORT.COMMENTS IMP SPEC: NORMAL
PATH REPORT.FINAL DX SPEC: NORMAL
PATH REPORT.MICROSCOPIC SPEC OTHER STN: NORMAL
PATH REPORT.MICROSCOPIC SPEC OTHER STN: NORMAL
PATH REPORT.RELEVANT HX SPEC: NORMAL

## 2022-05-29 DIAGNOSIS — R10.13 ABDOMINAL PAIN, EPIGASTRIC: ICD-10-CM

## 2022-05-29 DIAGNOSIS — D49.0 IPMN (INTRADUCTAL PAPILLARY MUCINOUS NEOPLASM): ICD-10-CM

## 2022-05-29 DIAGNOSIS — C16.3 MALIGNANT NEOPLASM OF PYLORIC ANTRUM (H): ICD-10-CM

## 2022-05-30 ENCOUNTER — MYC REFILL (OUTPATIENT)
Dept: ONCOLOGY | Facility: CLINIC | Age: 57
End: 2022-05-30
Payer: MEDICARE

## 2022-05-30 DIAGNOSIS — G89.3 CHRONIC PAIN DUE TO NEOPLASM: ICD-10-CM

## 2022-05-30 DIAGNOSIS — M79.2 NEUROPATHIC PAIN: ICD-10-CM

## 2022-05-31 ENCOUNTER — TELEPHONE (OUTPATIENT)
Dept: PALLIATIVE CARE | Facility: CLINIC | Age: 57
End: 2022-05-31
Payer: MEDICARE

## 2022-05-31 DIAGNOSIS — D49.0 IPMN (INTRADUCTAL PAPILLARY MUCINOUS NEOPLASM): ICD-10-CM

## 2022-05-31 DIAGNOSIS — R10.13 ABDOMINAL PAIN, EPIGASTRIC: ICD-10-CM

## 2022-05-31 DIAGNOSIS — C16.3 MALIGNANT NEOPLASM OF PYLORIC ANTRUM (H): ICD-10-CM

## 2022-05-31 RX ORDER — OXYCODONE HYDROCHLORIDE 5 MG/1
5 TABLET ORAL 4 TIMES DAILY PRN
Qty: 120 TABLET | Refills: 0 | Status: SHIPPED | OUTPATIENT
Start: 2022-05-31 | End: 2022-06-01

## 2022-05-31 RX ORDER — FAMOTIDINE 20 MG/1
20 TABLET, FILM COATED ORAL 2 TIMES DAILY PRN
Qty: 30 TABLET | Refills: 3 | Status: SHIPPED | OUTPATIENT
Start: 2022-05-31 | End: 2022-06-14

## 2022-05-31 RX ORDER — PANTOPRAZOLE SODIUM 40 MG/1
40 TABLET, DELAYED RELEASE ORAL DAILY
Qty: 90 TABLET | Refills: 1 | Status: SHIPPED | OUTPATIENT
Start: 2022-05-31 | End: 2022-09-01

## 2022-05-31 NOTE — TELEPHONE ENCOUNTER
----- Message from Lashawn Santiago RN sent at 5/31/2022  7:49 AM CDT -----  Regarding: Video rtn with ASK  Good morning,    Please call pt and schedule for a video rtn visit with ASK sometime in July.    Thanks,    Astrid

## 2022-05-31 NOTE — TELEPHONE ENCOUNTER
Received [employer] message from patient requesting refill of oxycodone.     Last refill: 5/3/22  Last office visit: 4/14/22  Scheduled for follow up planned for July, message sent to scheduling.     Will route request to MD for review.     Reviewed [place]  Report.

## 2022-05-31 NOTE — CONFIDENTIAL NOTE
Famotidine and Protonix refill   Last prescribing provider: Dr Pollard     Last clinic visit date: 5/19/22 Dr pollard     Any missed appointments or no-shows since last clinic visit?: No     Recommendations for requested medication (if none, N/A): Copied from chart note 5/19/22 Dr Pollard     famotidine (PEPCID) 20 MG tablet Take 20 mg by mouth 2 times daily as needed     pantoprazole (PROTONIX) 40 MG EC tablet TAKE 1 TABLET BY MOUTH EVERY DAY 90 tablet 1        Next clinic visit date: Not yet scheduled     Any other pertinent information (if none, N/A): N/A

## 2022-06-01 DIAGNOSIS — G89.3 CHRONIC PAIN DUE TO NEOPLASM: ICD-10-CM

## 2022-06-01 DIAGNOSIS — M79.2 NEUROPATHIC PAIN: ICD-10-CM

## 2022-06-01 RX ORDER — PANTOPRAZOLE SODIUM 40 MG/1
TABLET, DELAYED RELEASE ORAL
Qty: 90 TABLET | Refills: 1 | OUTPATIENT
Start: 2022-06-01

## 2022-06-01 RX ORDER — OXYCODONE HYDROCHLORIDE 5 MG/1
5 TABLET ORAL 4 TIMES DAILY PRN
Qty: 120 TABLET | Refills: 0 | Status: SHIPPED | OUTPATIENT
Start: 2022-06-01 | End: 2022-07-21

## 2022-06-01 NOTE — TELEPHONE ENCOUNTER
Resending refill of oxycodone 5 mg tablets to an alternate pharmacy due to no stock at pt's preferred pharmacy.

## 2022-06-02 ENCOUNTER — NURSE TRIAGE (OUTPATIENT)
Dept: UROLOGY | Facility: CLINIC | Age: 57
End: 2022-06-02
Payer: MEDICARE

## 2022-06-02 DIAGNOSIS — N39.0 RECURRENT UTI: Primary | ICD-10-CM

## 2022-06-02 NOTE — TELEPHONE ENCOUNTER
[facility] Call Center    Phone Message    May a detailed message be left on voicemail: yes     Reason for Call: Order(s): Other:   Reason for requested: UA/UC  Date needed: ASAP  Provider name: Dr. Velez    Patient is having bladder infections and would like an order placed. Please reach out to patient when ready. Thank you      Action Taken: Message routed to:  [facility]    Travel Screening: Not Applicable

## 2022-06-03 ENCOUNTER — LAB (OUTPATIENT)
Dept: LAB | Facility: CLINIC | Age: 57
End: 2022-06-03
Payer: MEDICARE

## 2022-06-03 DIAGNOSIS — N39.0 RECURRENT UTI: ICD-10-CM

## 2022-06-03 LAB
ALBUMIN UR-MCNC: NEGATIVE MG/DL
APPEARANCE UR: ABNORMAL
BACTERIA #/AREA URNS HPF: ABNORMAL /HPF
BILIRUB UR QL STRIP: NEGATIVE
COLOR UR AUTO: YELLOW
GLUCOSE UR STRIP-MCNC: NEGATIVE MG/DL
HGB UR QL STRIP: ABNORMAL
KETONES UR STRIP-MCNC: NEGATIVE MG/DL
LEUKOCYTE ESTERASE UR QL STRIP: ABNORMAL
NITRATE UR QL: NEGATIVE
PH UR STRIP: 6 [PH] (ref 5–7)
RBC #/AREA URNS AUTO: ABNORMAL /HPF
SP GR UR STRIP: 1.01 (ref 1–1.03)
SQUAMOUS #/AREA URNS AUTO: ABNORMAL /LPF
UROBILINOGEN UR STRIP-ACNC: 0.2 E.U./DL
WBC #/AREA URNS AUTO: ABNORMAL /HPF

## 2022-06-03 PROCEDURE — 87086 URINE CULTURE/COLONY COUNT: CPT

## 2022-06-03 PROCEDURE — 87186 SC STD MICRODIL/AGAR DIL: CPT

## 2022-06-03 PROCEDURE — 81001 URINALYSIS AUTO W/SCOPE: CPT

## 2022-06-03 NOTE — TELEPHONE ENCOUNTER
Nurse Triage SBAR    Is this a 2nd Level Triage? NO    Situation: Spoke to pt. Pt reports that she thinks she has a UTI.     Background: Pt has history of recurring UTI.     Assessment: Urine is cloudy with foul odor. Noted increased urgency and frequency. No hematuria. No fever. Sometimes has burning sensation with urination. Intermittent bladder cramping. Symptoms started about 3-4 days ago. Pt reports that her low back on bilateral sides has also been bothering her since Sunday. Rated 6/10 intermittently. Drinking plenty of water and cranberry juice. Chart and problems list reviewed.    Preferred pharmacy is [facility] in [facility].       Protocol Recommended Disposition:   See Today In Office    Recommendation: UA/UC  Increase fluid intake     .    Does the patient meet one of the following criteria for ADS visit consideration? No     Jessica Wray RN MSN    Orders Placed This Encounter   Procedures     Routine UA with microscopic - No culture     Standing Status:   Future     Standing Expiration Date:   6/3/2023     Urine Culture Aerobic Bacterial     Standing Status:   Future     Standing Expiration Date:   6/3/2023         Reason for Disposition    Side (flank) or lower back pain present    Protocols used: URINARY SYMPTOMS-A-OH

## 2022-06-05 LAB — BACTERIA UR CULT: ABNORMAL

## 2022-06-06 ENCOUNTER — TELEPHONE (OUTPATIENT)
Dept: UROLOGY | Facility: CLINIC | Age: 57
End: 2022-06-06
Payer: MEDICARE

## 2022-06-06 DIAGNOSIS — N39.0 URINARY TRACT INFECTION: Primary | ICD-10-CM

## 2022-06-06 RX ORDER — NITROFURANTOIN 25; 75 MG/1; MG/1
100 CAPSULE ORAL 2 TIMES DAILY
Qty: 10 CAPSULE | Refills: 0 | Status: SHIPPED | OUTPATIENT
Start: 2022-06-06 | End: 2022-06-27

## 2022-06-06 RX ORDER — NITROFURANTOIN 25; 75 MG/1; MG/1
100 CAPSULE ORAL 2 TIMES DAILY
Qty: 10 CAPSULE | Refills: 0 | Status: CANCELLED | OUTPATIENT
Start: 2022-06-06

## 2022-06-06 NOTE — TELEPHONE ENCOUNTER
Attempted to call pt. Left detailed message to call clinic back at [phone].   To confirm preferred pharmacy to send medication for treatment of UTI. Order is pending.     Jessica Wray RN MSN

## 2022-06-06 NOTE — TELEPHONE ENCOUNTER
Order sent per protocol.     Jessica Hutchinson, RN MSN    Signed Prescriptions:                        Disp   Refills    nitroFURantoin macrocrystal-monohydrate (M*10 cap*0        Sig: Take 1 capsule (100 mg) by mouth 2 times daily  Authorizing Provider: FROYLAN ARTEAGA HILARIO  Ordering User: JESSICA HUTCHINSON

## 2022-06-06 NOTE — TELEPHONE ENCOUNTER
[facility]    Phone Message    May a detailed message be left on voicemail: yes     Reason for Call: Requesting Results   Name/type of test: UA/UC  Date of test: 6/3/22  Was test done at a location other than [facility] (Please fill in the location if not [facility])?: No    Please give pt a call back regarding results and next steps to help treat bladder infection      Action Taken: Message routed to:  [facility]RO    Travel Screening: Not Applicable

## 2022-06-06 NOTE — PROGRESS NOTES
Attempted to call pt. Left detailed message to call clinic back at [phone].   Urine culture positive for infection. Sending treatment per protocol. Need to confirm preferred pharmacy.     Jessica Wray RN MSN

## 2022-06-06 NOTE — TELEPHONE ENCOUNTER
Pt called to give pharmacy info. If anything additional is needed please reach out to pt. Thanks. [id number] [place] [facility] - [address]

## 2022-06-08 ENCOUNTER — LAB (OUTPATIENT)
Dept: LAB | Facility: CLINIC | Age: 57
End: 2022-06-08
Attending: INTERNAL MEDICINE
Payer: MEDICARE

## 2022-06-08 ENCOUNTER — OFFICE VISIT (OUTPATIENT)
Dept: NEUROLOGY | Facility: CLINIC | Age: 57
End: 2022-06-08
Attending: STUDENT IN AN ORGANIZED HEALTH CARE EDUCATION/TRAINING PROGRAM
Payer: MEDICARE

## 2022-06-08 DIAGNOSIS — R26.89 BALANCE PROBLEMS: ICD-10-CM

## 2022-06-08 DIAGNOSIS — T45.1X5A CHEMOTHERAPY-INDUCED NEUROPATHY (H): ICD-10-CM

## 2022-06-08 DIAGNOSIS — C16.8 MALIGNANT NEOPLASM OF OVERLAPPING SITES OF STOMACH (H): ICD-10-CM

## 2022-06-08 DIAGNOSIS — G62.0 CHEMOTHERAPY-INDUCED NEUROPATHY (H): ICD-10-CM

## 2022-06-08 PROCEDURE — 250N000011 HC RX IP 250 OP 636: Performed by: INTERNAL MEDICINE

## 2022-06-08 PROCEDURE — 95885 MUSC TST DONE W/NERV TST LIM: CPT | Performed by: PSYCHIATRY & NEUROLOGY

## 2022-06-08 PROCEDURE — 95912 NRV CNDJ TEST 11-12 STUDIES: CPT | Performed by: PSYCHIATRY & NEUROLOGY

## 2022-06-08 RX ORDER — HEPARIN SODIUM (PORCINE) LOCK FLUSH IV SOLN 100 UNIT/ML 100 UNIT/ML
5 SOLUTION INTRAVENOUS
Status: COMPLETED | OUTPATIENT
Start: 2022-06-08 | End: 2022-06-08

## 2022-06-08 RX ADMIN — Medication 5 ML: at 10:15

## 2022-06-08 NOTE — NURSING NOTE
"Chief Complaint   Patient presents with     Lab Only     Port flushed by rn.       Port accessed with 20 gauge 3/4\" gripper needle by rn.  Good blood return noted and port flushed with NS and heparin then de-accessed.  Pt tolerated well.      Jo Disla RN    "

## 2022-06-08 NOTE — LETTER
2022       RE: Nathalie Bashir  [address] [facility] [address]     Dear Colleague,    Thank you for referring your patient, Nathalie Bashir, to the [facility] EMG CLINIC [place] at [facility]. Please see a copy of my visit note below.                        [facility]  Electrodiagnostic Laboratory                 Department of Neurology                                                                                                         Test Date:  2022    Patient: Nathalie Bashir : 1965 Physician: Jaycob vAalos MD   Sex: Female AGE: 56 year Ref Phys:    ID#: [id number]   Technician: Kristy Behling     History and Examination:  Nathalie Bashir is a 56 year old woman with acral paresthesias and a clinical suspicion of polyneuropathy. Past history is notable for gastric surgery as well as chemotherapy for adenocarcinoma. She is referred for electrodiagnostic evaluation of these symptoms.     Techniques:  Motor conduction studies were done with surface recording electrodes. Sensory conduction studies were performed with surface electrodes, unless indicated otherwise by (n), designating the use of subdermal recording electrodes. Temperature was monitored and recorded throughout the study. Upper extremities were maintained at a temperature of 32 degrees Centigrade or higher.  EMG was done with a concentric needle electrode.     Results:  Bilateral sural and right superficial fibular sensory nerve action potentials were absent. Right radial, median, and ulnar sensory nerve action potentials were moderately attenuated. The left fibular compound muscle action potential was markedly attenuated. The right fibular compound muscle action potential amplitude was marginally attenuated, without conduction slowing across the fibular head. Tibial compound muscle action potentials were moderately attenuated bilaterally, worse on the left. Right  median and ulnar motor conduction studies were normal. Screening electromyography was normal.     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     Comment:  There is asymmetry of findings in the lower limbs, worse on the left. If a left lumbosacral radiculopathy is suspected clinically, electromyography of the left lower limb may be helpful.    _________________________  Jaycob Avalos MD  Board Certified in Clinical Neurophysiology and Neuromuscular Medicine        Nerve Conduction Studies  Motor Sites      Latency Amplitude Neg. Amp Diff Segment Distance Velocity Neg. Dur Neg Area Diff Temperature Comment   Site (ms) Norm (mV) Norm %  cm m/s Norm ms %  C    Right Dp Branch Fibular (TA) Motor   Fib Head 3.7  < 6.0 3.3 -      8.1  31    Pop Fossa 6.5  < 5.7 3.0 - -9.1 Pop Fossa-Fib Head 10.5 38 - 8.0 -6.3 31    Left Fibular (EDB) Motor   Ankle 4.2  < 6.0 0.44  > 2.5  Ankle-EDB 8   8.3  31.5    Right Fibular (EDB) Motor   Ankle 3.0  < 6.0 2.3  > 2.5  Ankle-EDB 8   5.4  31    Bel Fib Head 11.5 - 1.39 - -39.6 Bel Fib Head-Ankle 38 45  > 38 6.2 -23.7 31.1    Pop Fossa 13.7 - 1.43 - 2.9 Pop Fossa-Bel Fib Head 9 41  > 38 6.8 5.2 31.1    Right Median (APB) Motor   Wrist 3.7  < 4.4 7.5  > 5.0  Wrist-APB 8   5.2  35    Elbow 7.9 - 6.4 - -14.7 Elbow-Wrist 22 52  > 48 5.3 -8.9 35.1    Right Median/Ulnar (Lumb-Dors Int II) Motor        Median (Lumb I)   Wrist 3.1 - 1.26 -      6.4  34.3         Ulnar (Dorsal Int (manus))   Wrist 2.9 - 2.3 -      4.5  34.7    Left Tibial (AHB) Motor   Ankle 3.8  < 6.5 1.39  > 4.4  Ankle-AHB 8   6.5  31    Right Tibial (AHB) Motor   Ankle 3.4  < 6.5 2.5  > 4.4  Ankle-AHB 8   5.3  31.1    Knee 14.3 - 1.56 - -37.6 Knee-Ankle 42 39  > 38 5.3 -40.0 31.2    Right Ulnar (ADM) Motor   Wrist 2.4  < 3.5 9.0  > 5.0  Wrist-ADM 8   4.2  34.4    Bel Elbow 6.3 - 8.9 - -1.11 Bel Elbow-Wrist 21  54  > 48 4.2 -2.7 34.2    Abv Elbow 8.3 - 8.2 - -7.9 Abv Elbow-Bel Elbow 10 50  > 48 4.4 -5.0 34.2      Sensory Sites      Onset Lat Peak Lat Amp (O-P) Amp (P-P) Segment Distance Velocity Temperature   Site ms ms  V Norm  V  cm m/s Norm  C   Right Median Sensory   Wrist-Dig II 2.4 3.3 3  > 10 2 Wrist-Dig II 14 58  > 48 33.9   Right Radial Sensory   Forearm-Wrist 1.40 1.88 9  > 15 9 Forearm-Wrist 9 64 - 34.5   Right Superficial Fibular Sensory   14 cm-Ankle NR NR NR  > 3 NR 14 cm-Ankle 12.5 NR  > 38 30   Left Sural Sensory   Calf-Lat Mall NR NR NR  > 5 NR Calf-Lat Mall 14 NR  > 38 31.8   Right Sural Sensory   Calf-Lat Mall NR NR NR  > 5 NR Calf-Lat Mall 14 NR  > 38 30.9   Right Ulnar Sensory   Wrist-Dig V 2.3 2.7 2  > 8 4 Wrist-Dig V 12.5 54  > 48 34.3     F Wave Studies     Min-F Max-F Dispersion Persistence Mean-F F-Norm L-R Mean-F L-R Mean-F Norm F/M Ratio F-M Lat (ms)   Right Median (Abd Poll Brev)  35.1  C   27.27 30.08 2.81 60.00 28.79 <33  <2.2 0.93 23.83   Right Tibial (Abd Hallucis)  31.1  C   43.75 55.63 11.88 100.00 49.28 <61  <5.7 3.83 38.91       Electromyography     Side Muscle Ins Act Fibs/PSW Fasc HF Amp Dur Poly Recrt Int Pat   Right Tib Anterior Nml None Nml 0 Nml Nml 0 Nml Nml   Right Vastus Lat Nml None Nml 0 Nml Nml 0 Nml Nml         NCS Waveforms:    Motor                           Sensory                               Sincerely,    Jaycob Avalos MD

## 2022-06-09 NOTE — PROGRESS NOTES
[facility]  Electrodiagnostic Laboratory                 Department of Neurology                                                                                                         Test Date:  2022    Patient: Nathalie Bashir : 1965 Physician: Jaycob Avalos MD   Sex: Female AGE: 56 year Ref Phys:    ID#: [id number]   Technician: Kristy Behling     History and Examination:  Nathalie Bashir is a 56 year old woman with acral paresthesias and a clinical suspicion of polyneuropathy. Past history is notable for gastric surgery as well as chemotherapy for adenocarcinoma. She is referred for electrodiagnostic evaluation of these symptoms.     Techniques:  Motor conduction studies were done with surface recording electrodes. Sensory conduction studies were performed with surface electrodes, unless indicated otherwise by (n), designating the use of subdermal recording electrodes. Temperature was monitored and recorded throughout the study. Upper extremities were maintained at a temperature of 32 degrees Centigrade or higher.  EMG was done with a concentric needle electrode.     Results:  Bilateral sural and right superficial fibular sensory nerve action potentials were absent. Right radial, median, and ulnar sensory nerve action potentials were moderately attenuated. The left fibular compound muscle action potential was markedly attenuated. The right fibular compound muscle action potential amplitude was marginally attenuated, without conduction slowing across the fibular head. Tibial compound muscle action potentials were moderately attenuated bilaterally, worse on the left. Right median and ulnar motor conduction studies were normal. Screening electromyography was normal.     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     Comment:  There is asymmetry of  findings in the lower limbs, worse on the left. If a left lumbosacral radiculopathy is suspected clinically, electromyography of the left lower limb may be helpful.    _________________________  Jaycob Avalos MD  Board Certified in Clinical Neurophysiology and Neuromuscular Medicine        Nerve Conduction Studies  Motor Sites      Latency Amplitude Neg. Amp Diff Segment Distance Velocity Neg. Dur Neg Area Diff Temperature Comment   Site (ms) Norm (mV) Norm %  cm m/s Norm ms %  C    Right Dp Branch Fibular (TA) Motor   Fib Head 3.7  < 6.0 3.3 -      8.1  31    Pop Fossa 6.5  < 5.7 3.0 - -9.1 Pop Fossa-Fib Head 10.5 38 - 8.0 -6.3 31    Left Fibular (EDB) Motor   Ankle 4.2  < 6.0 0.44  > 2.5  Ankle-EDB 8   8.3  31.5    Right Fibular (EDB) Motor   Ankle 3.0  < 6.0 2.3  > 2.5  Ankle-EDB 8   5.4  31    Bel Fib Head 11.5 - 1.39 - -39.6 Bel Fib Head-Ankle 38 45  > 38 6.2 -23.7 31.1    Pop Fossa 13.7 - 1.43 - 2.9 Pop Fossa-Bel Fib Head 9 41  > 38 6.8 5.2 31.1    Right Median (APB) Motor   Wrist 3.7  < 4.4 7.5  > 5.0  Wrist-APB 8   5.2  35    Elbow 7.9 - 6.4 - -14.7 Elbow-Wrist 22 52  > 48 5.3 -8.9 35.1    Right Median/Ulnar (Lumb-Dors Int II) Motor        Median (Lumb I)   Wrist 3.1 - 1.26 -      6.4  34.3         Ulnar (Dorsal Int (manus))   Wrist 2.9 - 2.3 -      4.5  34.7    Left Tibial (AHB) Motor   Ankle 3.8  < 6.5 1.39  > 4.4  Ankle-AHB 8   6.5  31    Right Tibial (AHB) Motor   Ankle 3.4  < 6.5 2.5  > 4.4  Ankle-AHB 8   5.3  31.1    Knee 14.3 - 1.56 - -37.6 Knee-Ankle 42 39  > 38 5.3 -40.0 31.2    Right Ulnar (ADM) Motor   Wrist 2.4  < 3.5 9.0  > 5.0  Wrist-ADM 8   4.2  34.4    Bel Elbow 6.3 - 8.9 - -1.11 Bel Elbow-Wrist 21 54  > 48 4.2 -2.7 34.2    Abv Elbow 8.3 - 8.2 - -7.9 Abv Elbow-Bel Elbow 10 50  > 48 4.4 -5.0 34.2      Sensory Sites      Onset Lat Peak Lat Amp (O-P) Amp (P-P) Segment Distance Velocity Temperature   Site ms ms  V Norm  V  cm m/s Norm  C   Right Median Sensory   Wrist-Dig II 2.4 3.3 3  > 10 2  Wrist-Dig II 14 58  > 48 33.9   Right Radial Sensory   Forearm-Wrist 1.40 1.88 9  > 15 9 Forearm-Wrist 9 64 - 34.5   Right Superficial Fibular Sensory   14 cm-Ankle NR NR NR  > 3 NR 14 cm-Ankle 12.5 NR  > 38 30   Left Sural Sensory   Calf-Lat Mall NR NR NR  > 5 NR Calf-Lat Mall 14 NR  > 38 31.8   Right Sural Sensory   Calf-Lat Mall NR NR NR  > 5 NR Calf-Lat Mall 14 NR  > 38 30.9   Right Ulnar Sensory   Wrist-Dig V 2.3 2.7 2  > 8 4 Wrist-Dig V 12.5 54  > 48 34.3     F Wave Studies     Min-F Max-F Dispersion Persistence Mean-F F-Norm L-R Mean-F L-R Mean-F Norm F/M Ratio F-M Lat (ms)   Right Median (Abd Poll Brev)  35.1  C   27.27 30.08 2.81 60.00 28.79 <33  <2.2 0.93 23.83   Right Tibial (Abd Hallucis)  31.1  C   43.75 55.63 11.88 100.00 49.28 <61  <5.7 3.83 38.91       Electromyography     Side Muscle Ins Act Fibs/PSW Fasc HF Amp Dur Poly Recrt Int Pat   Right Tib Anterior Nml None Nml 0 Nml Nml 0 Nml Nml   Right Vastus Lat Nml None Nml 0 Nml Nml 0 Nml Nml         NCS Waveforms:    Motor                           Sensory

## 2022-06-13 DIAGNOSIS — C16.3 MALIGNANT NEOPLASM OF PYLORIC ANTRUM (H): ICD-10-CM

## 2022-06-13 DIAGNOSIS — R10.13 ABDOMINAL PAIN, EPIGASTRIC: ICD-10-CM

## 2022-06-14 RX ORDER — FAMOTIDINE 20 MG/1
20 TABLET, FILM COATED ORAL 2 TIMES DAILY PRN
Qty: 30 TABLET | Refills: 3 | Status: SHIPPED | OUTPATIENT
Start: 2022-06-14 | End: 2022-06-23

## 2022-06-22 ENCOUNTER — VIRTUAL VISIT (OUTPATIENT)
Dept: ONCOLOGY | Facility: CLINIC | Age: 57
End: 2022-06-22
Attending: STUDENT IN AN ORGANIZED HEALTH CARE EDUCATION/TRAINING PROGRAM
Payer: MEDICARE

## 2022-06-22 DIAGNOSIS — T45.1X5A CHEMOTHERAPY-INDUCED NEUROPATHY (H): ICD-10-CM

## 2022-06-22 DIAGNOSIS — G62.0 CHEMOTHERAPY-INDUCED NEUROPATHY (H): ICD-10-CM

## 2022-06-22 DIAGNOSIS — R10.13 ABDOMINAL PAIN, EPIGASTRIC: ICD-10-CM

## 2022-06-22 DIAGNOSIS — C16.3 MALIGNANT NEOPLASM OF PYLORIC ANTRUM (H): Primary | ICD-10-CM

## 2022-06-22 DIAGNOSIS — R26.89 BALANCE PROBLEMS: ICD-10-CM

## 2022-06-22 DIAGNOSIS — C16.3 MALIGNANT NEOPLASM OF PYLORIC ANTRUM (H): ICD-10-CM

## 2022-06-22 PROCEDURE — 99215 OFFICE O/P EST HI 40 MIN: CPT | Mod: 95 | Performed by: STUDENT IN AN ORGANIZED HEALTH CARE EDUCATION/TRAINING PROGRAM

## 2022-06-22 PROCEDURE — G0463 HOSPITAL OUTPT CLINIC VISIT: HCPCS | Mod: PN,RTG | Performed by: STUDENT IN AN ORGANIZED HEALTH CARE EDUCATION/TRAINING PROGRAM

## 2022-06-22 NOTE — PROGRESS NOTES
[facility]   PM&R clinic note        Interval history:     Nathalie Bashir presents to clinic today for follow up reg her rehab needs.   She has h/o history of gastric adenocarcinoma (diagnosed during whipple in 1/2020 for presumed IPMN, no pancreatic malignancy found).  Was last seen in clinic on 3/18/22.  Recommendations included:  1. Work-up:  1. EMG ordered to further evaluate extent/severity of chemotherapy induced neuropathy.   2. Therapy/equipment/braces:  1. Outpatient PT referral placed for balance difficulties.  2. Outpatient hand therapy referral placed for hand neuropathy symptoms and resulting difficulties with fine motor tasks.  3. Medications:  1. Continue Cymbalta and oxycodone prn per Palliative Care team.  4. Follow up: 3 months virtual visit.    Oncology History:  -Followed for a pancreatic duct dilatation and pancreatic cyst which was noted in November 2018.    -Since May 2019 she started noticing some nausea and vomiting and occasional abdominal discomfort.    -She had an ultrasound in August 2019 which was essentially unremarkable.    -She had a repeat endoscopic ultrasound on 10/29/2019 which showed increase in size of the cyst as well as dilation of the main pancreatic duct.  FNA and fluid analysis showed mucinous epithelium.    -She was referred to Dr. 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  Surprisingly there was no pancreatic ductal neoplasm seen but on the resected specimen stomach cancer was noted.  It was poorly differentiated adenocarcinoma with poorly cohesive/signet ring cell type with proximal gastric mucosal and serosal margins being positive.  There was lymphovascular and perineural invasion seen.  22 lymph nodes were sampled and all were benign.  It was a pT4aN0 lesion.  HER-2/christine FISH was not amplified.       -EUS on 3/3/2020 without clear evidence of neoplasm endoscopically. Left gastric lymph node  fine-needle aspiration was negative for carcinoma. Biopsy from the gastric jejunal anastomosis as well as lesser curvature of the stomach also did not show any malignancy.  -She had a PET/CT on 3/13/2020 which showed soft tissue streaky density with increased FDG uptake adjacent to the pancreaticojejunostomy which could be neoplastic in origin.  There is mild increased FDG uptake in the gastric remnant.  Focal area of soft tissue thickening with fatty streakiness along medial laparotomy with increased FDG uptake which likely is inflammatory. Increased FDG uptake in thoracic esophagus which could be esophagitis.   -She started on chemotherapy with 5FU, oxaliplatin, and Taxotere on 3/27/20. She was seen on 4/7/20 for evaluation of fevers. She was treated for possible pneumonia with Levaquin.   -She was seen in clinic 4/13/20 for RUQ pain,CT and labs reassuring. She received cycles 2 and 3 of chemotherapy on 4/20/20 and 5/4/20.   -She was admitted from 5/21-5/23/20 with neutropenic aspiration pneumonia. She received cycle 4 on 5/27/20 with Neulasta support.   -PET/CT on 6/3/20 showed mild residual uptake at the site of gastrojejunal anastomosis which likely is physiologic.  There is almost resolution of soft tissue nodule next to the pancreaticoduodenal anastomosis without FDG uptake.  There is focal increased uptake in the left posterior acetabulum which could be artifact.   -Decreased dose of oxaliplatin due to neuropathy/cold sensitivity on 6/11. Decreased 5FU to 85% with cycle #6 due to mucositis/diarrhea/taste changes. Treatment was held 7/9/20 due to diarrhea, dehydration, tachycardia, rash, and weight loss and she got cycle 7 on 7/15. On 7/20 she presented to the ED with RUQ abdominal pain, and was admitted 7/20-7/29 for acute cholangitis    -She received C#8 FLOT on 8/14/2020.   -PET/CT on 9/2/2020 did not show evidence of disease.  Small peritoneal nodule was a stable.  This was most consistent with fat  necrosis.   -CT chest abdomen and pelvis on 9/23/2020 also does not show evidence of malignancy recurrence and the peritoneal nodule in the right upper quadrant is stable.  -Before definite surgery she had diagnostic laparoscopy which was negative for evidence of malignancy so on 10/21/2020 she had exploratory laparotomy with extensive lysis of additions, partial omentectomy, resection of Gastrojejunal Anastomosis w/ En Bloc Subtotal Gastrectomy and modified D2 Lymphadenectomy with David-en-Y Gastrojejunostomy Reconstruction.  Final pathology showed Focal residual poorly differentiated adenocarcinoma (0.4cm) with signet-ring morphology status    post-neoadjuvant chemotherapy and all 9 lymph nodes were benign.  All margins were negative.  It was a pT1bN0 lesion.   -Before definite surgery she had diagnostic laparoscopy which was negative for evidence of malignancy so on 10/21/2020 she had exploratory laparotomy with extensive lysis of additions, partial omentectomy, resection of Gastrojejunal Anastomosis w/ En Bloc Subtotal Gastrectomy and modified D2 Lymphadenectomy with David-en-Y Gastrojejunostomy Reconstruction.  Final pathology showed Focal residual poorly differentiated adenocarcinoma (0.4cm) with signet-ring morphology status    post-neoadjuvant chemotherapy and all 9 lymph nodes were benign.  All margins were negative.  It was a [id number] lesion.   -She was admitted from 11/4/2020-11/6/2020 for nausea vomiting and left upper quadrant pain and CT scan showed fluid collection in the left upper quadrant, likely hematoma.  Upper GI was negative for leak.  No drainage was recommended by IR.  He was discharged on 7 days of levofloxacin and Flagyl.  -11/20/2020.  CT abdomen and pelvis showed postsurgical changes of Whipple and subtotal gastrectomy with David-en-Y reconstruction.  There is resolution of previously demonstrated fluid collection between the stomach and the spleen and decrease in sigmoid thickening.  Small  left pleural effusion with atelectasis adjacent to it.   -12/7/2020.  EGD showed healthy-appearing and generous pouch (remaining the stomach) with widely patent end to side gastrojejunostomy, though with the David/enteral limb mildly stenostic and tortuous at its origin. Therefore a covered metal stent was placed from stomach to jejunum across the stenotic region, resulting in straightening of the course.   -2/8/2021.  EGD was done and stent was removed.  -5/21/2021.  CT chest abdomen and pelvis does not show evidence of recurrence of malignancy.  -5/19/22- Last saw Dr. Pollard for follow up. Continues with duloxetine and QID oxycodone for neuropathy. Plan for repeat CT CAT in 6 months, repeat EGD on as needed basis. No evidence of recurrence  -6/8/22- EMG done with Dr. Avalos. 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    Symptoms,  Patient was seen for a return virtual visit today.  In terms of neuropathy, her left hand index finger has gotten worse. Her feet up to her knees is about the same. She has been doing hand therapy with OT. She has not started PT yet. She has had 2 session of OT.   She has had a difficult summer with some family difficulties with her son and grandkids, and niece's recent passing. She wants to hold off on therapy for now.   She endorses feeling left leg is weaker and neuropathic symptoms are slightly worse in left leg when compared to the right. This is not a new onset or acute, and has been that way for several months. This correlates with Neurology clinical suspicion for possible left lumbar radiculopathy that might be contributing to symptoms.  She states that in regards to duloxetine, she feels like her dose may be too high, and would like to discuss with Palliative regarding possibly reducing the dose.   She has no other concerns today.      Therapies/HEP,  Not currently participating in therapy. Did two sessions of OT, but did not schedule  PT because of all the family difficulties.      Functionally,   No changes since her last visit.      Social history is unchanged.        Medications:  Current Outpatient Medications   Medication Sig Dispense Refill     amitriptyline (ELAVIL) 100 MG tablet Take 1 tablet (100 mg) by mouth At Bedtime 30 tablet 3     calcium carbonate 500 mg, elemental, (OSCAL 500) 1250 (500 Ca) MG TABS tablet Take by mouth 3 times daily       ciprofloxacin (CIPRO) 500 MG tablet Take 1 tablet (500 mg) by mouth 2 times daily 10 tablet 0     Cyanocobalamin 1000 MCG CAPS Take by mouth daily       cyclobenzaprine (FLEXERIL) 10 MG tablet TAKE 1 TABLET BY MOUTH 1-3 TIMES DAILY       diclofenac (FLECTOR) 1.3 % patch apply 1 patch to affected area twice daily       DULoxetine (CYMBALTA) 30 MG capsule Take 3 capsules (90 mg) by mouth daily 90 capsule 2     famotidine (PEPCID) 20 MG tablet TAKE 1 TABLET (20 MG) BY MOUTH 2 TIMES DAILY AS NEEDED 30 tablet 3     fluticasone (FLONASE) 50 MCG/ACT nasal spray Spray 1 spray into both nostrils daily       hydrOXYzine (VISTARIL) 25 MG capsule Take 25-50 mg by mouth 4 times daily as needed for itching       loperamide (IMODIUM) 2 MG capsule Take 4 mg by mouth 4 times daily as needed for diarrhea       loratadine-pseudoePHEDrine (CLARITIN-D 24-HOUR)  MG 24 hr tablet Take 1 tablet by mouth daily as needed        magic mouthwash (ENTER INGREDIENTS IN COMMENTS) suspension Take 5 mLs by mouth       magnesium 250 MG tablet Take 1 tablet by mouth daily       minocycline (MINOCIN) 100 MG capsule Take 1 capsule by mouth 2 times daily       multivitamin w/minerals (THERA-VIT-M) tablet Take 1 tablet by mouth daily       nitroFURantoin macrocrystal-monohydrate (MACROBID) 100 MG capsule Take 1 capsule (100 mg) by mouth 2 times daily 10 capsule 0     ondansetron (ZOFRAN) 8 MG tablet Take 1 tablet (8 mg) by mouth every 8 hours as needed (Nausea/Vomiting) 90 tablet 2     oxyCODONE (ROXICODONE) 5 MG tablet Take 1  tablet (5 mg) by mouth 4 times daily as needed for moderate to severe pain Maximum 4 pills/day. 120 tablet 0     pantoprazole (PROTONIX) 40 MG EC tablet Take 1 tablet (40 mg) by mouth daily 90 tablet 1     sennosides (SENOKOT) 8.6 MG tablet 1-2 tablets 1-2 times daily as needed. 120 tablet 1     simethicone 80 MG TABS Take 1 tablet by mouth every 6 hours as needed (bloating, gas, belching) 90 tablet 3     SUMAtriptan (IMITREX) 100 MG tablet Take 100 mg by mouth at onset of headache for migraine       tiZANidine (ZANAFLEX) 4 MG capsule Take 1 capsule by mouth every 8 hours                Physical Exam:   LMP 09/23/2014   Gen: NAD, pleasant and cooperative   HEENT: Atraumatic, normocephalic, extraocular movements appear intact  Pulm: non-labored breathing in room air  Neuro/MSK:   Orientation: Oriented to person, time, place and situation, Exhibits good insight into her condition and ongoing treatment  Motor: Observed moving upper extremities actively against gravity.    Labs/Imaging:  Lab Results   Component Value Date    WBC 6.8 05/19/2022    HGB 11.6 (L) 05/19/2022    HCT 35.7 05/19/2022    MCV 94 05/19/2022     05/19/2022     Lab Results   Component Value Date     05/09/2022    POTASSIUM 4.2 05/09/2022    CHLORIDE 107 05/09/2022    CO2 30 05/09/2022     (H) 05/09/2022     Lab Results   Component Value Date    GFRESTIMATED 68 05/09/2022    GFRESTBLACK 81 05/21/2021     Lab Results   Component Value Date    AST 39 05/09/2022    ALT 44 05/09/2022    ALKPHOS 132 05/09/2022    BILITOTAL 1.0 05/09/2022     Lab Results   Component Value Date    INR 1.21 (H) 12/07/2020     Lab Results   Component Value Date    BUN 14 05/09/2022    CR 0.98 05/09/2022              Assessment/Plan   Nathalie Bashir presents to clinic today for follow up reg her rehab needs. She has h/o history of gastric adenocarcinoma (diagnosed during whipple in 1/2020 for presumed IPMN, no pancreatic malignancy found). Was last seen  in clinic on 3/18/22. Nathalie has been about the same in terms of neuropathic symptoms since her last visit. She has slight worsening with index finger function in left hand. She has had a difficult time with deaths and issues with her family, so would like to postpone restarting therapies until the fall as she is watching her grandkids at this time. Will reach out to Dr. Millard regarding adjusting the duloxetine dose. We will also obtain a LLE EMG to evaluate for possible left lumbar radiculopathy based on Neurology recs. Her EMG results were discussed at length with her. Will plan a 3 month follow up. Patient is in agreement with this plan.      1. Work-up:  1. LLE EMG ordered to further evaluate possible left lumbar radiculopathy  2. Therapy/equipment/braces:  1. Plan to restart outpatient PT and OT in the fall. Instructed to stay active and continue home exercise program as tolerated.  3. Medications:  1. Continue duloxetine and oxycodone per Palliative recs. Will contact Dr. Óscar Cook regarding patient's request to adjust duloxetine dose.  4. Follow up: 3 month virtual visit.      Apurva Dobbs MD  Physical Medicine & Rehabilitation      50 minutes spent on the date of the encounter doing chart review, history and exam, documentation and further activities as noted above.

## 2022-06-22 NOTE — PATIENT INSTRUCTIONS
A left lower extremity EMG was ordered to further evaluate for a possible compressed nerve root in your left lumbar spine.  Dr. Dobbs will reach out to Dr. Óscar Cook regarding your duloxetine dose.  Follow up with Dr. Dobbs in 3 months for a virtual visit.

## 2022-06-22 NOTE — PROGRESS NOTES
Nathalie is a 56 year old who is being evaluated via a billable video visit.      How would you like to obtain your AVS? [employer]  If the video visit is dropped, the invitation should be resent by: Text to cell phone: [phone]   Will anyone else be joining your video visit? No        Video-Visit Details    Video Start Time: 8:04 AM    Type of service:  Video Visit    Video End Time:8:35 AM    Originating Location (pt. Location): Home    Distant Location (provider location):  [facility]     Platform used for Video Visit: Miguel A Brasher

## 2022-06-22 NOTE — LETTER
6/22/2022         RE: Nathalie Bashir  [address] [address]        Dear Colleague,    Thank you for referring your patient, Nathalie Bashir, to the [facility]. Please see a copy of my visit note below.    Nathalie is a 56 year old who is being evaluated via a billable video visit.      How would you like to obtain your AVS? MyChart  If the video visit is dropped, the invitation should be resent by: Text to cell phone: [phone]   Will anyone else be joining your video visit? No        Video-Visit Details    Video Start Time: 8:04 AM    Type of service:  Video Visit    Video End Time:8:35 AM    Originating Location (pt. Location): Home    Distant Location (provider location):  [facility]     Platform used for Video Visit: Miguel A Brasher      [facility]   PM&R clinic note        Interval history:     Nathalie Bashir presents to clinic today for follow up reg her rehab needs.   She has h/o history of gastric adenocarcinoma (diagnosed during whipple in 1/2020 for presumed IPMN, no pancreatic malignancy found).  Was last seen in clinic on 3/18/22.  Recommendations included:  1. Work-up:  1. EMG ordered to further evaluate extent/severity of chemotherapy induced neuropathy.   2. Therapy/equipment/braces:  1. Outpatient PT referral placed for balance difficulties.  2. Outpatient hand therapy referral placed for hand neuropathy symptoms and resulting difficulties with fine motor tasks.  3. Medications:  1. Continue Cymbalta and oxycodone prn per Palliative Care team.  4. Follow up: 3 months virtual visit.    Oncology History:  -Followed for a pancreatic duct dilatation and pancreatic cyst which was noted in November 2018.    -Since May 2019 she started noticing some nausea and vomiting and occasional abdominal discomfort.    -She had an ultrasound in August 2019 which was essentially unremarkable.     -She had a repeat endoscopic ultrasound on 10/29/2019 which showed increase in size of the cyst as well as dilation of the main pancreatic duct.  FNA and fluid analysis showed mucinous epithelium.    -She was referred to Dr. 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  Surprisingly there was no pancreatic ductal neoplasm seen but on the resected specimen stomach cancer was noted.  It was poorly differentiated adenocarcinoma with poorly cohesive/signet ring cell type with proximal gastric mucosal and serosal margins being positive.  There was lymphovascular and perineural invasion seen.  22 lymph nodes were sampled and all were benign.  It was a pT4aN0 lesion.  HER-2/christine FISH was not amplified.       -EUS on 3/3/2020 without clear evidence of neoplasm endoscopically. Left gastric lymph node fine-needle aspiration was negative for carcinoma. Biopsy from the gastric jejunal anastomosis as well as lesser curvature of the stomach also did not show any malignancy.  -She had a PET/CT on 3/13/2020 which showed soft tissue streaky density with increased FDG uptake adjacent to the pancreaticojejunostomy which could be neoplastic in origin.  There is mild increased FDG uptake in the gastric remnant.  Focal area of soft tissue thickening with fatty streakiness along medial laparotomy with increased FDG uptake which likely is inflammatory. Increased FDG uptake in thoracic esophagus which could be esophagitis.   -She started on chemotherapy with 5FU, oxaliplatin, and Taxotere on 3/27/20. She was seen on 4/7/20 for evaluation of fevers. She was treated for possible pneumonia with Levaquin.   -She was seen in clinic 4/13/20 for RUQ pain,CT and labs reassuring. She received cycles 2 and 3 of chemotherapy on 4/20/20 and 5/4/20.   -She was admitted from 5/21-5/23/20 with neutropenic aspiration pneumonia. She received cycle 4 on 5/27/20 with Neulasta support.   -PET/CT on 6/3/20 showed mild  residual uptake at the site of gastrojejunal anastomosis which likely is physiologic.  There is almost resolution of soft tissue nodule next to the pancreaticoduodenal anastomosis without FDG uptake.  There is focal increased uptake in the left posterior acetabulum which could be artifact.   -Decreased dose of oxaliplatin due to neuropathy/cold sensitivity on 6/11. Decreased 5FU to 85% with cycle #6 due to mucositis/diarrhea/taste changes. Treatment was held 7/9/20 due to diarrhea, dehydration, tachycardia, rash, and weight loss and she got cycle 7 on 7/15. On 7/20 she presented to the ED with RUQ abdominal pain, and was admitted 7/20-7/29 for acute cholangitis    -She received C#8 FLOT on 8/14/2020.   -PET/CT on 9/2/2020 did not show evidence of disease.  Small peritoneal nodule was a stable.  This was most consistent with fat necrosis.   -CT chest abdomen and pelvis on 9/23/2020 also does not show evidence of malignancy recurrence and the peritoneal nodule in the right upper quadrant is stable.  -Before definite surgery she had diagnostic laparoscopy which was negative for evidence of malignancy so on 10/21/2020 she had exploratory laparotomy with extensive lysis of additions, partial omentectomy, resection of Gastrojejunal Anastomosis w/ En Bloc Subtotal Gastrectomy and modified D2 Lymphadenectomy with David-en-Y Gastrojejunostomy Reconstruction.  Final pathology showed Focal residual poorly differentiated adenocarcinoma (0.4cm) with signet-ring morphology status    post-neoadjuvant chemotherapy and all 9 lymph nodes were benign.  All margins were negative.  It was a pT1bN0 lesion.   -Before definite surgery she had diagnostic laparoscopy which was negative for evidence of malignancy so on 10/21/2020 she had exploratory laparotomy with extensive lysis of additions, partial omentectomy, resection of Gastrojejunal Anastomosis w/ En Bloc Subtotal Gastrectomy and modified D2 Lymphadenectomy with David-en-Y  Gastrojejunostomy Reconstruction.  Final pathology showed Focal residual poorly differentiated adenocarcinoma (0.4cm) with signet-ring morphology status    post-neoadjuvant chemotherapy and all 9 lymph nodes were benign.  All margins were negative.  It was a fkN5bI5 lesion.   -She was admitted from 11/4/2020-11/6/2020 for nausea vomiting and left upper quadrant pain and CT scan showed fluid collection in the left upper quadrant, likely hematoma.  Upper GI was negative for leak.  No drainage was recommended by IR.  He was discharged on 7 days of levofloxacin and Flagyl.  -11/20/2020.  CT abdomen and pelvis showed postsurgical changes of Whipple and subtotal gastrectomy with David-en-Y reconstruction.  There is resolution of previously demonstrated fluid collection between the stomach and the spleen and decrease in sigmoid thickening.  Small left pleural effusion with atelectasis adjacent to it.   -12/7/2020.  EGD showed healthy-appearing and generous pouch (remaining the stomach) with widely patent end to side gastrojejunostomy, though with the David/enteral limb mildly stenostic and tortuous at its origin. Therefore a covered metal stent was placed from stomach to jejunum across the stenotic region, resulting in straightening of the course.   -2/8/2021.  EGD was done and stent was removed.  -5/21/2021.  CT chest abdomen and pelvis does not show evidence of recurrence of malignancy.  -5/19/22- Last saw Dr. Pollard for follow up. Continues with duloxetine and QID oxycodone for neuropathy. Plan for repeat CT CAT in 6 months, repeat EGD on as needed basis. No evidence of recurrence  -6/8/22- EMG done with Dr. Avalos. 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    Symptoms,  Patient was seen for a return virtual visit today.  In terms of neuropathy, her left hand index finger has gotten worse. Her feet up to her knees is about the same. She has been doing hand therapy with OT. She  has not started PT yet. She has had 2 session of OT.   She has had a difficult summer with some family difficulties with her son and grandkids, and niece's recent passing. She wants to hold off on therapy for now.   She endorses feeling left leg is weaker and neuropathic symptoms are slightly worse in left leg when compared to the right. This is not a new onset or acute, and has been that way for several months. This correlates with Neurology clinical suspicion for possible left lumbar radiculopathy that might be contributing to symptoms.  She states that in regards to duloxetine, she feels like her dose may be too high, and would like to discuss with Palliative regarding possibly reducing the dose.   She has no other concerns today.      Therapies/HEP,  Not currently participating in therapy. Did two sessions of OT, but did not schedule PT because of all the family difficulties.      Functionally,   No changes since her last visit.      Social history is unchanged.        Medications:  Current Outpatient Medications   Medication Sig Dispense Refill     amitriptyline (ELAVIL) 100 MG tablet Take 1 tablet (100 mg) by mouth At Bedtime 30 tablet 3     calcium carbonate 500 mg, elemental, (OSCAL 500) 1250 (500 Ca) MG TABS tablet Take by mouth 3 times daily       ciprofloxacin (CIPRO) 500 MG tablet Take 1 tablet (500 mg) by mouth 2 times daily 10 tablet 0     Cyanocobalamin 1000 MCG CAPS Take by mouth daily       cyclobenzaprine (FLEXERIL) 10 MG tablet TAKE 1 TABLET BY MOUTH 1-3 TIMES DAILY       diclofenac (FLECTOR) 1.3 % patch apply 1 patch to affected area twice daily       DULoxetine (CYMBALTA) 30 MG capsule Take 3 capsules (90 mg) by mouth daily 90 capsule 2     famotidine (PEPCID) 20 MG tablet TAKE 1 TABLET (20 MG) BY MOUTH 2 TIMES DAILY AS NEEDED 30 tablet 3     fluticasone (FLONASE) 50 MCG/ACT nasal spray Spray 1 spray into both nostrils daily       hydrOXYzine (VISTARIL) 25 MG capsule Take 25-50 mg by mouth 4  times daily as needed for itching       loperamide (IMODIUM) 2 MG capsule Take 4 mg by mouth 4 times daily as needed for diarrhea       loratadine-pseudoePHEDrine (CLARITIN-D 24-HOUR)  MG 24 hr tablet Take 1 tablet by mouth daily as needed        magic mouthwash (ENTER INGREDIENTS IN COMMENTS) suspension Take 5 mLs by mouth       magnesium 250 MG tablet Take 1 tablet by mouth daily       minocycline (MINOCIN) 100 MG capsule Take 1 capsule by mouth 2 times daily       multivitamin w/minerals (THERA-VIT-M) tablet Take 1 tablet by mouth daily       nitroFURantoin macrocrystal-monohydrate (MACROBID) 100 MG capsule Take 1 capsule (100 mg) by mouth 2 times daily 10 capsule 0     ondansetron (ZOFRAN) 8 MG tablet Take 1 tablet (8 mg) by mouth every 8 hours as needed (Nausea/Vomiting) 90 tablet 2     oxyCODONE (ROXICODONE) 5 MG tablet Take 1 tablet (5 mg) by mouth 4 times daily as needed for moderate to severe pain Maximum 4 pills/day. 120 tablet 0     pantoprazole (PROTONIX) 40 MG EC tablet Take 1 tablet (40 mg) by mouth daily 90 tablet 1     sennosides (SENOKOT) 8.6 MG tablet 1-2 tablets 1-2 times daily as needed. 120 tablet 1     simethicone 80 MG TABS Take 1 tablet by mouth every 6 hours as needed (bloating, gas, belching) 90 tablet 3     SUMAtriptan (IMITREX) 100 MG tablet Take 100 mg by mouth at onset of headache for migraine       tiZANidine (ZANAFLEX) 4 MG capsule Take 1 capsule by mouth every 8 hours                Physical Exam:   [facility] 09/23/2014   Gen: NAD, pleasant and cooperative   HEENT: Atraumatic, normocephalic, extraocular movements appear intact  Pulm: non-labored breathing in room air  Neuro/MSK:   Orientation: Oriented to person, time, place and situation, Exhibits good insight into her condition and ongoing treatment  Motor: Observed moving upper extremities actively against gravity.    Labs/Imaging:  Lab Results   Component Value Date    WBC 6.8 05/19/2022    HGB 11.6 (L) 05/19/2022    HCT 35.7  05/19/2022    MCV 94 05/19/2022     05/19/2022     Lab Results   Component Value Date     05/09/2022    POTASSIUM 4.2 05/09/2022    CHLORIDE 107 05/09/2022    CO2 30 05/09/2022     (H) 05/09/2022     Lab Results   Component Value Date    GFRESTIMATED 68 05/09/2022    GFRESTBLACK 81 05/21/2021     Lab Results   Component Value Date    AST 39 05/09/2022    ALT 44 05/09/2022    ALKPHOS 132 05/09/2022    BILITOTAL 1.0 05/09/2022     Lab Results   Component Value Date    INR 1.21 (H) 12/07/2020     Lab Results   Component Value Date    BUN 14 05/09/2022    CR 0.98 05/09/2022              Assessment/Plan   Nathalie Bashir presents to clinic today for follow up reg her rehab needs. She has h/o history of gastric adenocarcinoma (diagnosed during whipple in 1/2020 for presumed IPMN, no pancreatic malignancy found). Was last seen in clinic on 3/18/22. Nathalie has been about the same in terms of neuropathic symptoms since her last visit. She has slight worsening with index finger function in left hand. She has had a difficult time with deaths and issues with her family, so would like to postpone restarting therapies until the fall as she is watching her grandkids at this time. Will reach out to Dr. Millard regarding adjusting the duloxetine dose. We will also obtain a LLE EMG to evaluate for possible left lumbar radiculopathy based on Neurology recs. Her EMG results were discussed at length with her. Will plan a 3 month follow up. Patient is in agreement with this plan.      1. Work-up:  1. LLE EMG ordered to further evaluate possible left lumbar radiculopathy  2. Therapy/equipment/braces:  1. Plan to restart outpatient PT and OT in the fall. Instructed to stay active and continue home exercise program as tolerated.  3. Medications:  1. Continue duloxetine and oxycodone per Palliative recs. Will contact Dr. Óscar Cook regarding patient's request to adjust duloxetine dose.  4. Follow up: 3 month  virtual visit.          50 minutes spent on the date of the encounter doing chart review, history and exam, documentation and further activities as noted above.        Again, thank you for allowing me to participate in the care of your patient.      Sincerely,    Apurva Dobbs MD

## 2022-06-23 RX ORDER — FAMOTIDINE 20 MG/1
20 TABLET, FILM COATED ORAL 2 TIMES DAILY PRN
Qty: 30 TABLET | Refills: 3 | Status: SHIPPED | OUTPATIENT
Start: 2022-06-23 | End: 2022-09-06

## 2022-06-27 ENCOUNTER — OFFICE VISIT (OUTPATIENT)
Dept: FAMILY MEDICINE | Facility: CLINIC | Age: 57
End: 2022-06-27
Payer: MEDICARE

## 2022-06-27 VITALS
RESPIRATION RATE: 18 BRPM | WEIGHT: 171.52 LBS | SYSTOLIC BLOOD PRESSURE: 97 MMHG | DIASTOLIC BLOOD PRESSURE: 65 MMHG | HEART RATE: 96 BPM | TEMPERATURE: 98.7 F | OXYGEN SATURATION: 99 % | BODY MASS INDEX: 26.02 KG/M2

## 2022-06-27 DIAGNOSIS — G89.3 CHRONIC PAIN DUE TO NEOPLASM: ICD-10-CM

## 2022-06-27 DIAGNOSIS — M79.2 NEUROPATHIC PAIN: ICD-10-CM

## 2022-06-27 DIAGNOSIS — J30.89 SEASONAL ALLERGIC RHINITIS DUE TO OTHER ALLERGIC TRIGGER: Primary | ICD-10-CM

## 2022-06-27 LAB — SARS-COV-2 RNA RESP QL NAA+PROBE: NEGATIVE

## 2022-06-27 PROCEDURE — U0005 INFEC AGEN DETEC AMPLI PROBE: HCPCS | Performed by: NURSE PRACTITIONER

## 2022-06-27 PROCEDURE — U0003 INFECTIOUS AGENT DETECTION BY NUCLEIC ACID (DNA OR RNA); SEVERE ACUTE RESPIRATORY SYNDROME CORONAVIRUS 2 (SARS-COV-2) (CORONAVIRUS DISEASE [COVID-19]), AMPLIFIED PROBE TECHNIQUE, MAKING USE OF HIGH THROUGHPUT TECHNOLOGIES AS DESCRIBED BY CMS-2020-01-R: HCPCS | Performed by: NURSE PRACTITIONER

## 2022-06-27 PROCEDURE — 99203 OFFICE O/P NEW LOW 30 MIN: CPT | Mod: CS | Performed by: NURSE PRACTITIONER

## 2022-06-27 RX ORDER — OXYCODONE HYDROCHLORIDE 5 MG/1
5 TABLET ORAL 4 TIMES DAILY PRN
Qty: 120 TABLET | Refills: 0 | Status: CANCELLED | OUTPATIENT
Start: 2022-06-27

## 2022-06-27 ASSESSMENT — ENCOUNTER SYMPTOMS
FEVER: 0
COUGH: 1
SHORTNESS OF BREATH: 0
RHINORRHEA: 1

## 2022-06-27 NOTE — PROGRESS NOTES
Assessment & Plan     Seasonal allergic rhinitis due to other allergic trigger    - Symptomatic; Yes; 6/20/2022 COVID-19 Virus (Coronavirus) by PCR Nose       Focused exam and history done due to COVID-19 pandemic in a walk-in setting.      History, exam, and vital signs consistent with seasonal allergic rhinitis with history of same.  Patient has clear rhinorrhea which will pour out at times.     No red flags.     Recheck if shortness of breath or new fevers develop.  Rest.     OTCs recommended: Restart home Flonase 2 sprays in each nostril daily.  Hydroxyzine that she is already taking may also be effective for allergies.  Recheck in 1 week if not much better.              Return in about 1 week (around 7/4/2022) for If no better.    [facility]    Leda Zelaya is a 56 year old female who presents to clinic today for the following health issues:  Chief Complaint   Patient presents with     Cough     Dry cough, running nose and sore throat x 1wk      HPI    Patient with a history of Crohn's disease and gastric cancer with dry cough, runny nose (copious clear) and sore throat/scratchy throat for 1 week.    Has h/o seasonal alllergies.  Feeling tired, worse last couple days.      No fevers.      Not sleeping well.     Has Flonase at home.  Not taking.  Just recently requested Vistaril which she takes for allergies and chronic pain.          Review of Systems   Constitutional: Negative for fever.   HENT: Positive for rhinorrhea (clear ). Negative for ear pain.    Respiratory: Positive for cough. Negative for shortness of breath.            Objective    BP 97/65   Pulse 96   Temp 98.7  F (37.1  C) (Tympanic)   Resp 18   Wt 77.8 kg (171 lb 8.3 oz)   LMP 09/23/2014   SpO2 99%   BMI 26.02 kg/m    Physical Exam  Constitutional:       General: She is not in acute distress.     Appearance: She is well-developed. She is not ill-appearing.   HENT:       Mouth/Throat:      Pharynx: Posterior oropharyngeal erythema present.      Tonsils: No tonsillar exudate. 1+ on the right. 1+ on the left.      Comments: Hoarse voice quality  Eyes:      General:         Right eye: No discharge.         Left eye: No discharge.      Conjunctiva/sclera: Conjunctivae normal.   Cardiovascular:      Rate and Rhythm: Normal rate and regular rhythm.      Pulses: Normal pulses.      Heart sounds: Normal heart sounds.   Pulmonary:      Effort: Pulmonary effort is normal.      Breath sounds: Normal breath sounds.   Musculoskeletal:         General: Normal range of motion.   Skin:     General: Skin is warm and dry.      Capillary Refill: Capillary refill takes less than 2 seconds.   Neurological:      Mental Status: She is alert and oriented to person, place, and time.   Psychiatric:         Mood and Affect: Mood normal.         Behavior: Behavior normal.         Thought Content: Thought content normal.         Judgment: Judgment normal.

## 2022-06-27 NOTE — TELEPHONE ENCOUNTER
Received telephone call from patient requesting refill of oxycodone.     Last refill: 6/1/22  Last office visit: 4/14/22  Scheduled for follow up 7/27/22     [facility] in [place] has a prescription on file that was cancelled due to not having enough tabs in stock earlier this month. Call placed to pharmacy with authorization to fill today due to patient travel.

## 2022-07-04 DIAGNOSIS — R10.13 ABDOMINAL PAIN, EPIGASTRIC: ICD-10-CM

## 2022-07-04 DIAGNOSIS — C16.3 MALIGNANT NEOPLASM OF PYLORIC ANTRUM (H): ICD-10-CM

## 2022-07-05 ENCOUNTER — TELEPHONE (OUTPATIENT)
Dept: UROLOGY | Facility: CLINIC | Age: 57
End: 2022-07-05

## 2022-07-05 DIAGNOSIS — N39.0 RECURRENT UTI: Primary | ICD-10-CM

## 2022-07-05 NOTE — TELEPHONE ENCOUNTER
Spoke to pt. Pt reports that she thinks she has a UTI. Pt just returned from vacation and doesn't feel she has been drinking enough water. Urine is cloudy, increased urgency, frequency, and foul odor. Symptoms started on Friday. No hematuria. No fever. No dysuria. Chart and problems list reviewed.    Jessica Wray RN MSN    Orders Placed This Encounter   Procedures     Routine UA with microscopic - No culture     Standing Status:   Future     Standing Expiration Date:   7/5/2023     Urine Culture Aerobic Bacterial     Standing Status:   Future     Standing Expiration Date:   7/5/2023

## 2022-07-05 NOTE — TELEPHONE ENCOUNTER
[facility] Call Center    Phone Message    May a detailed message be left on voicemail: yes     Reason for Call: Order(s): Other:   Reason for requested: UA/UC labs  Date needed: asap  Provider name: Dr. Velez    Patient came home from vacation and thinks she has a UTI. Please reach out to discuss. Thank you!      Action Taken: Message routed to:  [facility] ([facility] Uro    Travel Screening: Not Applicable

## 2022-07-07 ENCOUNTER — LAB (OUTPATIENT)
Dept: LAB | Facility: CLINIC | Age: 57
End: 2022-07-07
Payer: MEDICARE

## 2022-07-07 DIAGNOSIS — N39.0 RECURRENT UTI: ICD-10-CM

## 2022-07-07 LAB
ALBUMIN UR-MCNC: ABNORMAL MG/DL
APPEARANCE UR: ABNORMAL
BACTERIA #/AREA URNS HPF: ABNORMAL /HPF
BILIRUB UR QL STRIP: ABNORMAL
COLOR UR AUTO: YELLOW
GLUCOSE UR STRIP-MCNC: NEGATIVE MG/DL
HGB UR QL STRIP: ABNORMAL
HYALINE CASTS: 9 /LPF
KETONES UR STRIP-MCNC: NEGATIVE MG/DL
LEUKOCYTE ESTERASE UR QL STRIP: ABNORMAL
MUCOUS THREADS #/AREA URNS LPF: PRESENT /LPF
NITRATE UR QL: POSITIVE
PH UR STRIP: 6 [PH] (ref 5–7)
RBC URINE: 28 /HPF
SP GR UR STRIP: 1.02 (ref 1–1.03)
SQUAMOUS EPITHELIAL: 2 /HPF
UROBILINOGEN UR STRIP-MCNC: NORMAL MG/DL
WBC CLUMPS #/AREA URNS HPF: PRESENT /HPF
WBC URINE: >182 /HPF

## 2022-07-07 PROCEDURE — 87088 URINE BACTERIA CULTURE: CPT | Performed by: UROLOGY

## 2022-07-07 PROCEDURE — 81001 URINALYSIS AUTO W/SCOPE: CPT | Performed by: PATHOLOGY

## 2022-07-07 RX ORDER — FAMOTIDINE 20 MG/1
20 TABLET, FILM COATED ORAL 2 TIMES DAILY PRN
Qty: 30 TABLET | Refills: 3 | OUTPATIENT
Start: 2022-07-07

## 2022-07-09 LAB — BACTERIA UR CULT: ABNORMAL

## 2022-07-11 ENCOUNTER — TELEPHONE (OUTPATIENT)
Dept: UROLOGY | Facility: CLINIC | Age: 57
End: 2022-07-11

## 2022-07-11 DIAGNOSIS — N39.0 URINARY TRACT INFECTION: Primary | ICD-10-CM

## 2022-07-11 DIAGNOSIS — M79.2 NEUROPATHIC PAIN: Primary | ICD-10-CM

## 2022-07-11 RX ORDER — SULFAMETHOXAZOLE/TRIMETHOPRIM 800-160 MG
1 TABLET ORAL 2 TIMES DAILY
Qty: 10 TABLET | Refills: 0 | Status: ON HOLD | OUTPATIENT
Start: 2022-07-11 | End: 2022-08-23

## 2022-07-11 RX ORDER — AMITRIPTYLINE HYDROCHLORIDE 75 MG/1
75 TABLET ORAL AT BEDTIME
Qty: 30 TABLET | Refills: 3 | Status: SHIPPED | OUTPATIENT
Start: 2022-07-11 | End: 2023-11-17

## 2022-07-11 NOTE — TELEPHONE ENCOUNTER
Attempted to call pt. Left detailed message to call clinic back at [phone].   To inform pt of infection noted from UC results and order sent to [facility].     Jessica Hutchinson, RN MSN    Signed Prescriptions:                        Disp   Refills    sulfamethoxazole-trimethoprim (BACTRIM DS)*10 tab*0        Sig: Take 1 tablet by mouth 2 times daily  Authorizing Provider: FROYLAN ARTEAGA SEE HILARIO  Ordering User: JESSICA HUTCHINSON

## 2022-07-11 NOTE — TELEPHONE ENCOUNTER
Received telephone call from patient requesting refill of amitriptyline.     Last office visit: 4/14/22  Scheduled for follow up 7/27/22     Will route request to MD for review.     Reviewed [place]  Report.

## 2022-07-13 NOTE — TELEPHONE ENCOUNTER
Spoke to pt. Confirmed that pt has received medication. No other questions or concerns noted.     Jessica Wray RN MSN

## 2022-07-21 DIAGNOSIS — C16.3 MALIGNANT NEOPLASM OF PYLORIC ANTRUM (H): ICD-10-CM

## 2022-07-21 DIAGNOSIS — R10.13 ABDOMINAL PAIN, EPIGASTRIC: ICD-10-CM

## 2022-07-21 DIAGNOSIS — M79.2 NEUROPATHIC PAIN: ICD-10-CM

## 2022-07-21 DIAGNOSIS — G89.3 CHRONIC PAIN DUE TO NEOPLASM: ICD-10-CM

## 2022-07-21 RX ORDER — FAMOTIDINE 20 MG/1
20 TABLET, FILM COATED ORAL 2 TIMES DAILY PRN
Qty: 30 TABLET | Refills: 3 | OUTPATIENT
Start: 2022-07-21

## 2022-07-21 RX ORDER — OXYCODONE HYDROCHLORIDE 5 MG/1
5 TABLET ORAL 4 TIMES DAILY PRN
Qty: 120 TABLET | Refills: 0 | Status: SHIPPED | OUTPATIENT
Start: 2022-07-21 | End: 2022-08-15

## 2022-07-21 NOTE — TELEPHONE ENCOUNTER
Received telephone call from patient requesting refill of oxycodone.     Last refill: 6/27/22  Last office visit: 4/14/22  Scheduled for follow up 7/27/22     Will route request to MD for review.     Reviewed [place]  Report.

## 2022-07-26 NOTE — PROGRESS NOTES
Nathalie is a 56 year old who is being evaluated via a billable video visit.      Nathalie Bashir stated she is in the state of [place] for the visit today.    How would you like to obtain your AVS? MyChart  If the video visit is dropped, the invitation should be resent by: Text to cell phone: [phone]  Will anyone else be joining your video visit? No          Palliative Care Progress Note    Patient Name: Nathalie Bashir  Primary Provider: Alban Yuan    Chief Complaint/Patient ID: 57 yo F with gastric adenocarcinoma              - Found during Whipple 1/2020 for presumed IPMN - no pancreatic malignancy found, but had poorly differentiated adenoca.  Received neoadjuvant chemo (5FU, leucovorin, oxaliplatin, docetaxel) with curative-intent subtotal gastrectomy, David-en-Y gastrojejunostomy 10/2020.  Required stenting of the jejunum for stenosis causing pain, removed 2/8/21.      Chronic and chemo-induced neuropathy - intol of gabapentin, pregabalin, duloxetine somewhat effective, on TID/QID oxycodone     Last Palliative care appointment: 4/14/22 with me.      Reviewed: Yes, no concerns.     Interim History:  Nathalie Bashir is a 56 year old female who is seen today for follow up with Palliative Care via billable video visit.      Last saw Dr. Pollard 5/19- note reviewed Continues with duloxetine and QID oxycodone for neuropathy. Plan for repeat CT in 6 months, repeat EGD on as needed basis. No evidence of recurrence.    Had EMG done 6/8 with Dr. Avalos. 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    Started OT, held off on PT due to stress from several family difficulties (ongoing trouble with her son and grandsons and her niece's death).    She had previously requested a decrease in her Cymbalta dose from 90 mg to 60 mg, however since then, she has noticed an increase in her neuropathy pain.  This particularly involves both of her knees which is unusual.  When I  "asked her about why she had wanted to decrease Cymbalta, she said she was worried it was \"too much\" because her sister noticed she was saying things that \"were not right\".  On further questioning, this only happened on one occasion and it was the day of her niece's death when Nathalie had actually reviewed her body.    She does report a history of spinal cord issues that did precede her cancer diagnosis and chemotherapy.  She wonders how much of her symptoms could be related to that.  At her last visit with Dr. Dobbs, an EMG for the left leg was recommended and they discussed consideration for steroid injection in the future.    She also notes that her muscle spasms in her legs have gotten worse, so she is using the Flexeril more frequently, usually 2-3 times daily.     Social History:  Lives with her . Has a son and 3 grandchildren  Retired, worked in special education.  Social History     Tobacco Use     Smoking status: Former Smoker     Packs/day: 0.50     Years: 25.00     Pack years: 12.50     Types: Cigarettes     Quit date: 2020     Years since quittin.5     Smokeless tobacco: Never Used   Substance Use Topics     Alcohol use: No     Drug use: No     Family History- Reviewed in Epic.    Allergies   Allergen Reactions     Gluten Meal Palpitations     Augmentin Rash     Patient tolerated Zosyn in 2020     Oxycontin [Oxycodone]      Confusion, loopy, oxycodone is tolerated     Pregabalin      interfered with Cymbalta     Topiramate      Tongue numbness, taste alteration, irritable      Acetaminophen Nausea     Urine retention       Dust Mite Extract      Gabapentin Dizziness and GI Disturbance     Ketorolac Headache     Latex Rash     Methadone Fatigue     Mold      Naprosyn [Naproxen] Dizziness and GI Disturbance     Seasonal Allergies        Medications- Reviewed in Epic.    Past Medical History- Reviewed in [facility].    Past Surgical History- Reviewed in Epic.    Review of Systems:   ROS: 10 point " "ROS neg other than the symptoms noted above in the HPI.      Physical Exam:   Constitutional: Alert, pleasant, no apparent distress. Sitting up in chair.  Eyes: Sclera non-icteric, no eye discharge.  ENT: No nasal discharge. Ears grossly normal.  Respiratory: Unlabored respirations. Speaking in full sentences.  Musculoskeletal: Extremities appear normal- no gross deformities noted. No edema noted on upper body.   Skin: No suspicious lesions or rashes on visible skin.  Neurologic: Clear speech, no aphasia. No facial droop.  Psychiatric: Mentation appears normal, appropriate attention. Affect normal/bright. Does not appear anxious or depressed.      Key Data Reviewed:  LABS: 5/9/22- Cr 0.98, GFR 68, Albumin 3.2, LFTs wnl.   5/19/22- WBC 6.8, Hgb 11.6, Plts 269.     IMAGING: Ct CAP 5/17/22- IMPRESSION: In this patient with history of gastric carcinoma, status post Whipple procedure:  1. Postoperative changes of partial gastrectomy. No convincing evidence of local recurrence or metastatic disease.  2. Multiple small pulmonary nodules, which have not significantly changed in size since prior exam 05/21/2021. Recommend continued attention on follow-up.  3. Colonic diverticulosis.    Impression & Recommendations & Counseling:  Nathalie Bashir is a 56 year old female with history of gastric adenocarcinoma found during Whipple 1/2020 for presumed IPMN, no pancreatic malignancy found at that time, now s/p neoadjuvant chemotherapy and curative intent surgery 10/2020.  Is now on surveillance and feeling more back to normal.  Seen for symptom management of peripheral neuropathy that worsened in the setting of chemo.     Pain, neuropathic and arthritic  Balance issues - Had noticed some improvement in neuropathy with increased dose of Cymbalta.  She was concerned that the higher dose was too much for her after an episode where she \"was not saying the right thing\".  This episode only happened once and it was the day that her " "niece's body was discovered at her sister's home and EMS was around.  Nathalie had also reviewed her niece's body before she was taken away.  Given this, I think it was more likely an emotional/stress response that caused what ever symptoms she was having and was not related to Cymbalta.  Given the increase in pain across her knees, I recommended retrying the increase in Cymbalta again.  -Resume Cymbalta 90mg daily.   -Continue following with PM&R.  Agree with ongoing assessment to include EMG and possible CSI.  -Continue oxycodone 5 mg up to 4 times daily as needed.  -Previously discussed broadly that she is now in \"survivorship\" with planned surveillance every 6 months after curative-intent.  If we got to a stable point in her care/neuropathy, would recommend transitioning care back to her PCP.  Will continue to follow right now as we're titrating medications.      Nausea - 2/2 migraines.  Responds to Zofran.  There was apparently an issue with the last prescription I sent, so I sent a new refill today.    Muscle spasms - Have been worse lately.  Refilled Flexeril today.    Follow up: about 3 months    Video-Visit Details  Video Start Time: 1:50PM  Video End Time: 2:20PM    Originating Location (pt. Location): Home     Distant Location (provider location): [facility]     Platform used for Video Visit: Miguel A     Total time spent on day of encounter is 46 mins, including reviewing record, review of above studies, above visit with patient, symptomatic discussion of neuropathy pain, nausea, and muscle spasms, including medication adjustments/prescription management, and documentation.     Karla Millard, DO  Palliative Medicine   Pager [phone], AMCOM ID [id number]    Some chart documentation performed using Dragon Voice recognition Software. Although reviewed after completion, some words and grammatical errors may remain.    "

## 2022-07-27 ENCOUNTER — VIRTUAL VISIT (OUTPATIENT)
Dept: ONCOLOGY | Facility: CLINIC | Age: 57
End: 2022-07-27
Attending: STUDENT IN AN ORGANIZED HEALTH CARE EDUCATION/TRAINING PROGRAM
Payer: MEDICARE

## 2022-07-27 DIAGNOSIS — M62.838 MUSCLE SPASM: ICD-10-CM

## 2022-07-27 DIAGNOSIS — R11.0 NAUSEA: ICD-10-CM

## 2022-07-27 DIAGNOSIS — C16.9 GASTRIC ADENOCARCINOMA (H): ICD-10-CM

## 2022-07-27 DIAGNOSIS — Z51.5 ENCOUNTER FOR PALLIATIVE CARE: ICD-10-CM

## 2022-07-27 DIAGNOSIS — G89.3 CHRONIC PAIN DUE TO NEOPLASM: ICD-10-CM

## 2022-07-27 DIAGNOSIS — M79.2 NEUROPATHIC PAIN: Primary | ICD-10-CM

## 2022-07-27 PROCEDURE — 99215 OFFICE O/P EST HI 40 MIN: CPT | Mod: 95 | Performed by: STUDENT IN AN ORGANIZED HEALTH CARE EDUCATION/TRAINING PROGRAM

## 2022-07-27 PROCEDURE — G0463 HOSPITAL OUTPT CLINIC VISIT: HCPCS | Mod: PN,RTG | Performed by: STUDENT IN AN ORGANIZED HEALTH CARE EDUCATION/TRAINING PROGRAM

## 2022-07-27 RX ORDER — CYCLOBENZAPRINE HCL 10 MG
10 TABLET ORAL 3 TIMES DAILY PRN
Qty: 90 TABLET | Refills: 3 | Status: SHIPPED | OUTPATIENT
Start: 2022-07-27 | End: 2022-12-14

## 2022-07-27 RX ORDER — ONDANSETRON 8 MG/1
8 TABLET, FILM COATED ORAL EVERY 8 HOURS PRN
Qty: 90 TABLET | Refills: 2 | Status: SHIPPED | OUTPATIENT
Start: 2022-07-27 | End: 2022-09-20

## 2022-07-27 RX ORDER — DULOXETIN HYDROCHLORIDE 30 MG/1
90 CAPSULE, DELAYED RELEASE ORAL DAILY
Qty: 90 CAPSULE | Refills: 2 | Status: SHIPPED | OUTPATIENT
Start: 2022-07-27 | End: 2022-11-02

## 2022-07-27 NOTE — NURSING NOTE
Nathalie Bashir verified meds and allergies are correct via patients eCheck-in. Patient confirms no changes at this time.    Mirta Sommer, Virtual Facilitator

## 2022-07-27 NOTE — LETTER
7/27/2022         RE: Nathalie Bashir  [address] [address]        Dear Colleague,    Thank you for referring your patient, Nathalie Bashir, to the [facility] [place]. Please see a copy of my visit note below.    Nathalie is a 56 year old who is being evaluated via a billable video visit.      Nathalie Bashir stated she is in the state of [place] for the visit today.    How would you like to obtain your AVS? MyChart  If the video visit is dropped, the invitation should be resent by: Text to cell phone: [phone]  Will anyone else be joining your video visit? No          Palliative Care Progress Note    Patient Name: Nathalie Bashir  Primary Provider: Alban Yuan    Chief Complaint/Patient ID: 55 yo F with gastric adenocarcinoma              - Found during Whipple 1/2020 for presumed IPMN - no pancreatic malignancy found, but had poorly differentiated adenoca.  Received neoadjuvant chemo (5FU, leucovorin, oxaliplatin, docetaxel) with curative-intent subtotal gastrectomy, David-en-Y gastrojejunostomy 10/2020.  Required stenting of the jejunum for stenosis causing pain, removed 2/8/21.      Chronic and chemo-induced neuropathy - intol of gabapentin, pregabalin, duloxetine somewhat effective, on TID/QID oxycodone     Last Palliative care appointment: 4/14/22 with me.      Reviewed: Yes, no concerns.     Interim History:  Nathalie Bashir is a 56 year old female who is seen today for follow up with Palliative Care via billable video visit.      Last saw Dr. Pollard 5/19- note reviewed Continues with duloxetine and QID oxycodone for neuropathy. Plan for repeat CT in 6 months, repeat EGD on as needed basis. No evidence of recurrence.    Had EMG done 6/8 with Dr. Avalos. 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    Started OT, held off on PT due to stress from several family difficulties (ongoing trouble with her son and  "grandsons and her niece's death).    She had previously requested a decrease in her Cymbalta dose from 90 mg to 60 mg, however since then, she has noticed an increase in her neuropathy pain.  This particularly involves both of her knees which is unusual.  When I asked her about why she had wanted to decrease Cymbalta, she said she was worried it was \"too much\" because her sister noticed she was saying things that \"were not right\".  On further questioning, this only happened on one occasion and it was the day of her niece's death when Nathalie had actually reviewed her body.    She does report a history of spinal cord issues that did precede her cancer diagnosis and chemotherapy.  She wonders how much of her symptoms could be related to that.  At her last visit with Dr. Dobbs, an EMG for the left leg was recommended and they discussed consideration for steroid injection in the future.    She also notes that her muscle spasms in her legs have gotten worse, so she is using the Flexeril more frequently, usually 2-3 times daily.     Social History:  Lives with her . Has a son and 3 grandchildren  Retired, worked in special education.  Social History     Tobacco Use     Smoking status: Former Smoker     Packs/day: 0.50     Years: 25.00     Pack years: 12.50     Types: Cigarettes     Quit date: 2020     Years since quittin.5     Smokeless tobacco: Never Used   Substance Use Topics     Alcohol use: No     Drug use: No     Family History- Reviewed in Epic.    Allergies   Allergen Reactions     Gluten Meal Palpitations     Augmentin Rash     Patient tolerated Zosyn in 2020     Oxycontin [Oxycodone]      Confusion, loopy, oxycodone is tolerated     Pregabalin      interfered with Cymbalta     Topiramate      Tongue numbness, taste alteration, irritable      Acetaminophen Nausea     Urine retention       Dust Mite Extract      Gabapentin Dizziness and GI Disturbance     Ketorolac Headache     Latex Rash     " Methadone Fatigue     Mold      Naprosyn [Naproxen] Dizziness and GI Disturbance     Seasonal Allergies        Medications- Reviewed in Epic.    Past Medical History- Reviewed in [facility].    Past Surgical History- Reviewed in Epic.    Review of Systems:   ROS: 10 point ROS neg other than the symptoms noted above in the HPI.      Physical Exam:   Constitutional: Alert, pleasant, no apparent distress. Sitting up in chair.  Eyes: Sclera non-icteric, no eye discharge.  ENT: No nasal discharge. Ears grossly normal.  Respiratory: Unlabored respirations. Speaking in full sentences.  Musculoskeletal: Extremities appear normal- no gross deformities noted. No edema noted on upper body.   Skin: No suspicious lesions or rashes on visible skin.  Neurologic: Clear speech, no aphasia. No facial droop.  Psychiatric: Mentation appears normal, appropriate attention. Affect normal/bright. Does not appear anxious or depressed.      Key Data Reviewed:  LABS: 5/9/22- Cr 0.98, GFR 68, Albumin 3.2, LFTs wnl.   5/19/22- WBC 6.8, Hgb 11.6, Plts 269.     IMAGING: Ct CAP 5/17/22- IMPRESSION: In this patient with history of gastric carcinoma, status post Whipple procedure:  1. Postoperative changes of partial gastrectomy. No convincing evidence of local recurrence or metastatic disease.  2. Multiple small pulmonary nodules, which have not significantly changed in size since prior exam 05/21/2021. Recommend continued attention on follow-up.  3. Colonic diverticulosis.    Impression & Recommendations & Counseling:  Nathalie Bashir is a 56 year old female with history of gastric adenocarcinoma found during Whipple 1/2020 for presumed IPMN, no pancreatic malignancy found at that time, now s/p neoadjuvant chemotherapy and curative intent surgery 10/2020.  Is now on surveillance and feeling more back to normal.  Seen for symptom management of peripheral neuropathy that worsened in the setting of chemo.     Pain, neuropathic and arthritic  Balance  "issues - Had noticed some improvement in neuropathy with increased dose of Cymbalta.  She was concerned that the higher dose was too much for her after an episode where she \"was not saying the right thing\".  This episode only happened once and it was the day that her niece's body was discovered at her sister's home and EMS was around.  Nathalie had also reviewed her niece's body before she was taken away.  Given this, I think it was more likely an emotional/stress response that caused what ever symptoms she was having and was not related to Cymbalta.  Given the increase in pain across her knees, I recommended retrying the increase in Cymbalta again.  -Resume Cymbalta 90mg daily.   -Continue following with PM&R.  Agree with ongoing assessment to include EMG and possible CSI.  -Continue oxycodone 5 mg up to 4 times daily as needed.  -Previously discussed broadly that she is now in \"survivorship\" with planned surveillance every 6 months after curative-intent.  If we got to a stable point in her care/neuropathy, would recommend transitioning care back to her PCP.  Will continue to follow right now as we're titrating medications.      Nausea - 2/2 migraines.  Responds to Zofran.  There was apparently an issue with the last prescription I sent, so I sent a new refill today.    Muscle spasms - Have been worse lately.  Refilled Flexeril today.    Follow up: about 3 months    Video-Visit Details  Video Start Time: 1:50PM  Video End Time: 2:20PM    Originating Location (pt. Location): Home     Distant Location (provider location): [facility]     Platform used for Video Visit: [employer]     Total time spent on day of encounter is 46 mins, including reviewing record, review of above studies, above visit with patient, symptomatic discussion of neuropathy pain, nausea, and muscle spasms, including medication adjustments/prescription management, and documentation.     Karla Millard, " DO  Palliative Medicine   Pager [phone], AM[facility] ID [id number]    Some chart documentation performed using Dragon Voice recognition Software. Although reviewed after completion, some words and grammatical errors may remain.        Again, thank you for allowing me to participate in the care of your patient.        Sincerely,        Karla Millard DO

## 2022-07-27 NOTE — PATIENT INSTRUCTIONS
Recommendations:  -Resume Cymbalta at 90 mg daily.  If you do not notice an appreciable difference in the knee pain from the neuropathy after 2 to 3 weeks, you can go back down to 60 mg.  -I refilled the Zofran and the Flexeril today.    Follow up: About 3 months with one of my partners, however please reach out sooner with questions or concerns      Reasons to Call    If you are having worsening/uncontrolled symptoms we want you to call!    You or your other physicians make any changes to medications we have prescribed.  -Please call for refills 4-5 days before you will run out of medication.    Important Phone Numbers    [facility] - Astrid Santiago. Palliative Care RN - [phone]    [facility] - Martine Russell. Palliative Care RN - [phone]  *For Refills, scheduling, and general questions - [phone]  *After hours or on weekends. Will connect you with on call MD. [phone]

## 2022-07-27 NOTE — LETTER
7/27/2022         RE: Nathalie Bashir  [address] [facility] [address]        Dear Colleague,    Thank you for referring your patient, Nathalie Bashir, to the [facility] [place]. Please see a copy of my visit note below.    Nathalie is a 56 year old who is being evaluated via a billable video visit.      Nathalie Bashir stated she is in the state of [place] for the visit today.    How would you like to obtain your AVS? MyChart  If the video visit is dropped, the invitation should be resent by: Text to cell phone: [phone]  Will anyone else be joining your video visit? No          Palliative Care Progress Note    Patient Name: Nathalie Bashir  Primary Provider: Alban Yuan    Chief Complaint/Patient ID: 57 yo F with gastric adenocarcinoma              - Found during Whipple 1/2020 for presumed IPMN - no pancreatic malignancy found, but had poorly differentiated adenoca.  Received neoadjuvant chemo (5FU, leucovorin, oxaliplatin, docetaxel) with curative-intent subtotal gastrectomy, David-en-Y gastrojejunostomy 10/2020.  Required stenting of the jejunum for stenosis causing pain, removed 2/8/21.      Chronic and chemo-induced neuropathy - intol of gabapentin, pregabalin, duloxetine somewhat effective, on TID/QID oxycodone     Last Palliative care appointment: 4/14/22 with me.      Reviewed: Yes, no concerns.     Interim History:  Nathalie Bashir is a 56 year old female who is seen today for follow up with Palliative Care via billable video visit.      Last saw Dr. Pollard 5/19- note reviewed Continues with duloxetine and QID oxycodone for neuropathy. Plan for repeat CT in 6 months, repeat EGD on as needed basis. No evidence of recurrence.    Had EMG done 6/8 with Dr. Avalos. 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    Started OT, held off on PT due to stress from several family difficulties (ongoing trouble with her son and  "grandsons and her niece's death).    She had previously requested a decrease in her Cymbalta dose from 90 mg to 60 mg, however since then, she has noticed an increase in her neuropathy pain.  This particularly involves both of her knees which is unusual.  When I asked her about why she had wanted to decrease Cymbalta, she said she was worried it was \"too much\" because her sister noticed she was saying things that \"were not right\".  On further questioning, this only happened on one occasion and it was the day of her niece's death when Nathalie had actually reviewed her body.    She does report a history of spinal cord issues that did precede her cancer diagnosis and chemotherapy.  She wonders how much of her symptoms could be related to that.  At her last visit with Dr. Dobbs, an EMG for the left leg was recommended and they discussed consideration for steroid injection in the future.    She also notes that her muscle spasms in her legs have gotten worse, so she is using the Flexeril more frequently, usually 2-3 times daily.     Social History:  Lives with her . Has a son and 3 grandchildren  Retired, worked in special education.  Social History     Tobacco Use     Smoking status: Former Smoker     Packs/day: 0.50     Years: 25.00     Pack years: 12.50     Types: Cigarettes     Quit date: 2020     Years since quittin.5     Smokeless tobacco: Never Used   Substance Use Topics     Alcohol use: No     Drug use: No     Family History- Reviewed in Epic.    Allergies   Allergen Reactions     Gluten Meal Palpitations     Augmentin Rash     Patient tolerated Zosyn in 2020     Oxycontin [Oxycodone]      Confusion, loopy, oxycodone is tolerated     Pregabalin      interfered with Cymbalta     Topiramate      Tongue numbness, taste alteration, irritable      Acetaminophen Nausea     Urine retention       Dust [place]ite Extract      Gabapentin Dizziness and GI Disturbance     Ketorolac Headache     Latex Rash     " Methadone Fatigue     Mold      Naprosyn [Naproxen] Dizziness and GI Disturbance     Seasonal Allergies        Medications- Reviewed in Epic.    Past Medical History- Reviewed in [facility].    Past Surgical History- Reviewed in Epic.    Review of Systems:   ROS: 10 point ROS neg other than the symptoms noted above in the HPI.      Physical Exam:   Constitutional: Alert, pleasant, no apparent distress. Sitting up in chair.  Eyes: Sclera non-icteric, no eye discharge.  ENT: No nasal discharge. Ears grossly normal.  Respiratory: Unlabored respirations. Speaking in full sentences.  Musculoskeletal: Extremities appear normal- no gross deformities noted. No edema noted on upper body.   Skin: No suspicious lesions or rashes on visible skin.  Neurologic: Clear speech, no aphasia. No facial droop.  Psychiatric: Mentation appears normal, appropriate attention. Affect normal/bright. Does not appear anxious or depressed.      Key Data Reviewed:  LABS: 5/9/22- Cr 0.98, GFR 68, Albumin 3.2, LFTs wnl.   5/19/22- WBC 6.8, Hgb 11.6, Plts 269.     IMAGING: Ct CAP 5/17/22- IMPRESSION: In this patient with history of gastric carcinoma, status post Whipple procedure:  1. Postoperative changes of partial gastrectomy. No convincing evidence of local recurrence or metastatic disease.  2. Multiple small pulmonary nodules, which have not significantly changed in size since prior exam 05/21/2021. Recommend continued attention on follow-up.  3. Colonic diverticulosis.    Impression & Recommendations & Counseling:  Nathalie Bashir is a 56 year old female with history of gastric adenocarcinoma found during Whipple 1/2020 for presumed IPMN, no pancreatic malignancy found at that time, now s/p neoadjuvant chemotherapy and curative intent surgery 10/2020.  Is now on surveillance and feeling more back to normal.  Seen for symptom management of peripheral neuropathy that worsened in the setting of chemo.     Pain, neuropathic and arthritic  Balance  "issues - Had noticed some improvement in neuropathy with increased dose of Cymbalta.  She was concerned that the higher dose was too much for her after an episode where she \"was not saying the right thing\".  This episode only happened once and it was the day that her niece's body was discovered at her sister's home and EMS was around.  Nathalie had also reviewed her niece's body before she was taken away.  Given this, I think it was more likely an emotional/stress response that caused what ever symptoms she was having and was not related to Cymbalta.  Given the increase in pain across her knees, I recommended retrying the increase in Cymbalta again.  -Resume Cymbalta 90mg daily.   -Continue following with PM&R.  Agree with ongoing assessment to include EMG and possible CSI.  -Continue oxycodone 5 mg up to 4 times daily as needed.  -Previously discussed broadly that she is now in \"survivorship\" with planned surveillance every 6 months after curative-intent.  If we got to a stable point in her care/neuropathy, would recommend transitioning care back to her PCP.  Will continue to follow right now as we're titrating medications.      Nausea - 2/2 migraines.  Responds to Zofran.  There was apparently an issue with the last prescription I sent, so I sent a new refill today.    Muscle spasms - Have been worse lately.  Refilled Flexeril today.    Follow up: about 3 months    Video-Visit Details  Video Start Time: 1:50PM  Video End Time: 2:20PM    Originating Location (pt. Location): Home     Distant Location (provider location): [facility]     Platform used for Video Visit: AdGrok     Total time spent on day of encounter is 46 mins, including reviewing record, review of above studies, above visit with patient, symptomatic discussion of neuropathy pain, nausea, and muscle spasms, including medication adjustments/prescription management, and documentation.     Karla Millard, " DO  Palliative Medicine   Pager [phone], AMCOM ID [id number]    Some chart documentation performed using Dragon Voice recognition Software. Although reviewed after completion, some words and grammatical errors may remain.        Again, thank you for allowing me to participate in the care of your patient.        Sincerely,        Karla Millard DO

## 2022-08-04 ENCOUNTER — LAB (OUTPATIENT)
Dept: LAB | Facility: CLINIC | Age: 57
End: 2022-08-04
Attending: INTERNAL MEDICINE
Payer: MEDICARE

## 2022-08-04 ENCOUNTER — NURSE TRIAGE (OUTPATIENT)
Dept: UROLOGY | Facility: CLINIC | Age: 57
End: 2022-08-04

## 2022-08-04 DIAGNOSIS — N39.0 RECURRENT UTI: Primary | ICD-10-CM

## 2022-08-04 DIAGNOSIS — N39.0 RECURRENT UTI: ICD-10-CM

## 2022-08-04 LAB
ALBUMIN UR-MCNC: NEGATIVE MG/DL
APPEARANCE UR: ABNORMAL
BACTERIA #/AREA URNS HPF: ABNORMAL /HPF
BILIRUB UR QL STRIP: NEGATIVE
COLOR UR AUTO: YELLOW
GLUCOSE UR STRIP-MCNC: NEGATIVE MG/DL
HGB UR QL STRIP: ABNORMAL
KETONES UR STRIP-MCNC: NEGATIVE MG/DL
LEUKOCYTE ESTERASE UR QL STRIP: ABNORMAL
MUCOUS THREADS #/AREA URNS LPF: PRESENT /LPF
NITRATE UR QL: POSITIVE
PH UR STRIP: 6 [PH] (ref 5–7)
RBC URINE: 1 /HPF
SP GR UR STRIP: 1.01 (ref 1–1.03)
SQUAMOUS EPITHELIAL: <1 /HPF
UROBILINOGEN UR STRIP-MCNC: NORMAL MG/DL
WBC URINE: >182 /HPF

## 2022-08-04 PROCEDURE — 81001 URINALYSIS AUTO W/SCOPE: CPT

## 2022-08-04 PROCEDURE — 250N000011 HC RX IP 250 OP 636: Performed by: INTERNAL MEDICINE

## 2022-08-04 PROCEDURE — 87086 URINE CULTURE/COLONY COUNT: CPT

## 2022-08-04 RX ORDER — HEPARIN SODIUM (PORCINE) LOCK FLUSH IV SOLN 100 UNIT/ML 100 UNIT/ML
5 SOLUTION INTRAVENOUS ONCE
Status: COMPLETED | OUTPATIENT
Start: 2022-08-04 | End: 2022-08-04

## 2022-08-04 RX ADMIN — SODIUM CHLORIDE, PRESERVATIVE FREE 5 ML: 5 INJECTION INTRAVENOUS at 12:04

## 2022-08-04 NOTE — TELEPHONE ENCOUNTER
Nurse Triage SBAR    Is this a 2nd Level Triage? NO    Situation: Spoke to pt. Pt reports UTI like symptoms.     Background: History of recurrent UTI's.     Assessment: Pt reports urgency and frequency. She is feeling waves go through her bladder and then she feels like she needs to go. Noted strong foul odor. Noted mild discomfort with urination. Urine is cloudy. No hematuria. No fever. No flank pain. Chart and problems list reviewed.      Protocol Recommended Disposition:   No disposition on file.    Recommendation: UA/UC.   Increase fluid intake.   Tylenol, ibuprofen as needed.   Heating pad.      .    Does the patient meet one of the following criteria for ADS visit consideration? No    Jessica Wray RN MSN    Orders Placed This Encounter   Procedures     Routine UA with microscopic - No culture     Standing Status:   Future     Standing Expiration Date:   8/4/2023     Urine Culture Aerobic Bacterial     Standing Status:   Future     Standing Expiration Date:   8/4/2023       Reason for Disposition    Urinating more frequently than usual (i.e., frequency)    Protocols used: URINARY SYMPTOMS-A-[place]

## 2022-08-04 NOTE — TELEPHONE ENCOUNTER
[facility] Call Center    Phone Message    May a detailed message be left on voicemail: yes     Reason for Call: Symptoms or Concerns     If patient has red-flag symptoms, warm transfer to triage line    Current symptom or concern: Patient complains of foul smelling urine, urinary frequency, cloudy urine - states she will be up at the lab today at 11:00 and perhaps she can have a UA done at that time?    Symptoms have been present for:  1-2 day(s)    Has patient previously been seen for this? Yes    By : Agustin    Date: 04/28/22 - recurrent UTI    Are there any new or worsening symptoms? Yes: patient states she has a history of UTI and feels a bladder infection coming on and is wondering if she can do a UA while at the lab today at 11:00. Please reach out to patient to discuss.      Action Taken: Other: Urology    Travel Screening: Not Applicable

## 2022-08-04 NOTE — TELEPHONE ENCOUNTER
Attempted to call pt. Left detailed message to call clinic back at [phone].   To discuss symptoms.   Did place order for UA/UC.     Jessica Wray RN MSN

## 2022-08-05 RX ORDER — NITROFURANTOIN 25; 75 MG/1; MG/1
100 CAPSULE ORAL 2 TIMES DAILY
Qty: 10 CAPSULE | Refills: 0 | Status: ON HOLD | OUTPATIENT
Start: 2022-08-05 | End: 2022-08-23

## 2022-08-05 NOTE — PROGRESS NOTES
Attempted to call pt. Left detailed message to call clinic back at [phone].   To inform pt of UC results and treatment sent to pharmacy. Treatment may be updated based on sensitivity results which are still pending.     Jessica Hutchinson, RN MSN    Signed Prescriptions:                        Disp   Refills    nitroFURantoin macrocrystal-monohydrate (M*10 cap*0        Sig: Take 1 capsule (100 mg) by mouth 2 times daily  Authorizing Provider: FROYLAN ARTEAGA HILARIO  Ordering User: JESSICA HUTCHINSON

## 2022-08-06 LAB — BACTERIA UR CULT: ABNORMAL

## 2022-08-15 DIAGNOSIS — G89.3 CHRONIC PAIN DUE TO NEOPLASM: ICD-10-CM

## 2022-08-15 DIAGNOSIS — M79.2 NEUROPATHIC PAIN: ICD-10-CM

## 2022-08-15 RX ORDER — OXYCODONE HYDROCHLORIDE 5 MG/1
5 TABLET ORAL 4 TIMES DAILY PRN
Qty: 120 TABLET | Refills: 0 | Status: SHIPPED | OUTPATIENT
Start: 2022-08-15 | End: 2022-09-12

## 2022-08-15 NOTE — TELEPHONE ENCOUNTER
Received telephone call from patient requesting refill of oxycodone.     Last refill: 7/21/22  Last office visit: 7/27/22  Scheduled for follow up planned in October/November     Will route request to MD for review.     Reviewed [place]  Report.

## 2022-08-22 ENCOUNTER — TELEPHONE (OUTPATIENT)
Dept: PALLIATIVE CARE | Facility: CLINIC | Age: 57
End: 2022-08-22

## 2022-08-22 ENCOUNTER — HOSPITAL ENCOUNTER (INPATIENT)
Facility: CLINIC | Age: 57
LOS: 3 days | Discharge: HOME | DRG: 872 | End: 2022-08-26
Attending: EMERGENCY MEDICINE | Admitting: STUDENT IN AN ORGANIZED HEALTH CARE EDUCATION/TRAINING PROGRAM
Payer: MEDICARE

## 2022-08-22 DIAGNOSIS — R07.81 RIB PAIN: ICD-10-CM

## 2022-08-22 DIAGNOSIS — R78.81 BACTEREMIA DUE TO GRAM-NEGATIVE BACTERIA: ICD-10-CM

## 2022-08-22 DIAGNOSIS — Z16.12: Primary | ICD-10-CM

## 2022-08-22 DIAGNOSIS — Z11.52 ENCOUNTER FOR SCREENING LABORATORY TESTING FOR SEVERE ACUTE RESPIRATORY SYNDROME CORONAVIRUS 2 (SARS-COV-2): ICD-10-CM

## 2022-08-22 DIAGNOSIS — N12 PYELONEPHRITIS: ICD-10-CM

## 2022-08-22 LAB
ALBUMIN SERPL BCG-MCNC: 3.6 G/DL (ref 3.5–5.2)
ALBUMIN UR-MCNC: 10 MG/DL
ALP SERPL-CCNC: 103 U/L (ref 35–104)
ALT SERPL W P-5'-P-CCNC: 30 U/L (ref 10–35)
ANION GAP SERPL CALCULATED.3IONS-SCNC: 6 MMOL/L (ref 7–15)
APPEARANCE UR: ABNORMAL
AST SERPL W P-5'-P-CCNC: 33 U/L (ref 10–35)
BACTERIA #/AREA URNS HPF: ABNORMAL /HPF
BASOPHILS # BLD AUTO: 0 10E3/UL (ref 0–0.2)
BASOPHILS NFR BLD AUTO: 0 %
BILIRUB SERPL-MCNC: 0.5 MG/DL
BILIRUB UR QL STRIP: NEGATIVE
BUN SERPL-MCNC: 11.9 MG/DL (ref 6–20)
CALCIUM SERPL-MCNC: 8.4 MG/DL (ref 8.6–10)
CHLORIDE SERPL-SCNC: 103 MMOL/L (ref 98–107)
COLOR UR AUTO: ABNORMAL
CREAT SERPL-MCNC: 0.95 MG/DL (ref 0.51–0.95)
D DIMER PPP FEU-MCNC: 0.5 UG/ML FEU (ref 0–0.5)
DEPRECATED HCO3 PLAS-SCNC: 28 MMOL/L (ref 22–29)
EOSINOPHIL # BLD AUTO: 0.2 10E3/UL (ref 0–0.7)
EOSINOPHIL NFR BLD AUTO: 2 %
ERYTHROCYTE [DISTWIDTH] IN BLOOD BY AUTOMATED COUNT: 15.4 % (ref 10–15)
GFR SERPL CREATININE-BSD FRML MDRD: 70 ML/MIN/1.73M2
GLUCOSE SERPL-MCNC: 98 MG/DL (ref 70–99)
GLUCOSE UR STRIP-MCNC: NEGATIVE MG/DL
HCT VFR BLD AUTO: 35.5 % (ref 35–47)
HGB BLD-MCNC: 11.1 G/DL (ref 11.7–15.7)
HGB UR QL STRIP: ABNORMAL
HOLD SPECIMEN: NORMAL
IMM GRANULOCYTES # BLD: 0 10E3/UL
IMM GRANULOCYTES NFR BLD: 0 %
KETONES UR STRIP-MCNC: NEGATIVE MG/DL
LEUKOCYTE ESTERASE UR QL STRIP: ABNORMAL
LIPASE SERPL-CCNC: 7 U/L (ref 13–60)
LYMPHOCYTES # BLD AUTO: 1.4 10E3/UL (ref 0.8–5.3)
LYMPHOCYTES NFR BLD AUTO: 16 %
MCH RBC QN AUTO: 30.3 PG (ref 26.5–33)
MCHC RBC AUTO-ENTMCNC: 31.3 G/DL (ref 31.5–36.5)
MCV RBC AUTO: 97 FL (ref 78–100)
MONOCYTES # BLD AUTO: 0.4 10E3/UL (ref 0–1.3)
MONOCYTES NFR BLD AUTO: 4 %
MUCOUS THREADS #/AREA URNS LPF: PRESENT /LPF
NEUTROPHILS # BLD AUTO: 7.2 10E3/UL (ref 1.6–8.3)
NEUTROPHILS NFR BLD AUTO: 78 %
NITRATE UR QL: NEGATIVE
NRBC # BLD AUTO: 0 10E3/UL
NRBC BLD AUTO-RTO: 0 /100
PH UR STRIP: 6 [PH] (ref 5–7)
PLATELET # BLD AUTO: 212 10E3/UL (ref 150–450)
POTASSIUM SERPL-SCNC: 4.5 MMOL/L (ref 3.4–5.3)
PROT SERPL-MCNC: 6.1 G/DL (ref 6.4–8.3)
RBC # BLD AUTO: 3.66 10E6/UL (ref 3.8–5.2)
RBC URINE: 12 /HPF
SODIUM SERPL-SCNC: 137 MMOL/L (ref 136–145)
SP GR UR STRIP: 1.01 (ref 1–1.03)
SQUAMOUS EPITHELIAL: <1 /HPF
UROBILINOGEN UR STRIP-MCNC: NORMAL MG/DL
WBC # BLD AUTO: 9.2 10E3/UL (ref 4–11)
WBC URINE: >182 /HPF
YEAST #/AREA URNS HPF: ABNORMAL /HPF

## 2022-08-22 PROCEDURE — 36415 COLL VENOUS BLD VENIPUNCTURE: CPT | Performed by: EMERGENCY MEDICINE

## 2022-08-22 PROCEDURE — 87086 URINE CULTURE/COLONY COUNT: CPT | Performed by: EMERGENCY MEDICINE

## 2022-08-22 PROCEDURE — 81001 URINALYSIS AUTO W/SCOPE: CPT | Performed by: EMERGENCY MEDICINE

## 2022-08-22 PROCEDURE — 83690 ASSAY OF LIPASE: CPT | Performed by: EMERGENCY MEDICINE

## 2022-08-22 PROCEDURE — 80053 COMPREHEN METABOLIC PANEL: CPT | Performed by: EMERGENCY MEDICINE

## 2022-08-22 PROCEDURE — 250N000013 HC RX MED GY IP 250 OP 250 PS 637: Performed by: EMERGENCY MEDICINE

## 2022-08-22 PROCEDURE — 99285 EMERGENCY DEPT VISIT HI MDM: CPT | Mod: 25 | Performed by: EMERGENCY MEDICINE

## 2022-08-22 PROCEDURE — 85025 COMPLETE CBC W/AUTO DIFF WBC: CPT | Performed by: EMERGENCY MEDICINE

## 2022-08-22 PROCEDURE — 93010 ELECTROCARDIOGRAM REPORT: CPT | Performed by: EMERGENCY MEDICINE

## 2022-08-22 PROCEDURE — 85379 FIBRIN DEGRADATION QUANT: CPT | Performed by: EMERGENCY MEDICINE

## 2022-08-22 PROCEDURE — 93005 ELECTROCARDIOGRAM TRACING: CPT | Performed by: EMERGENCY MEDICINE

## 2022-08-22 RX ORDER — IOPAMIDOL 755 MG/ML
111 INJECTION, SOLUTION INTRAVASCULAR ONCE
Status: COMPLETED | OUTPATIENT
Start: 2022-08-22 | End: 2022-08-23

## 2022-08-22 RX ORDER — OXYCODONE HYDROCHLORIDE 5 MG/1
5 TABLET ORAL ONCE
Status: COMPLETED | OUTPATIENT
Start: 2022-08-22 | End: 2022-08-22

## 2022-08-22 RX ADMIN — OXYCODONE HYDROCHLORIDE 5 MG: 5 TABLET ORAL at 20:06

## 2022-08-22 ASSESSMENT — ACTIVITIES OF DAILY LIVING (ADL)
ADLS_ACUITY_SCORE: 35
ADLS_ACUITY_SCORE: 35

## 2022-08-22 ASSESSMENT — ENCOUNTER SYMPTOMS
FREQUENCY: 0
DYSURIA: 0
FLANK PAIN: 1
DIARRHEA: 0
CONSTIPATION: 0
FEVER: 0
BRUISES/BLEEDS EASILY: 0
BACK PAIN: 1
COUGH: 0
HEMATURIA: 0

## 2022-08-22 NOTE — TELEPHONE ENCOUNTER
Oncology Nurse Triage - Pain    Situation: Nathalie (pt) reporting the following symptoms: Pain    Background:   Treating Provider:   Dr. Pollard and Dr. Joann Millard    Date of last office visit: 7/27/22 Dr. Millard    Recent Treatments: done with chemo in August 2020  Hx of whipple procedure    Assessment:     Location: under neath rib cage on right side of abdomen, radiates to lower back up to middle of back.   Onset: last few days  Duration/Frequency: constant  Rates: 9/10  Quality: sharp/shooting   Current med(s) used: Oxycodone 5mg, takes 1 tablet up to 4xdaily on regular schedule while awake. (doesn't take more than 4 tablets/day)  Does not notice any difference with relief with rest or activity.    LBM 8/22/22 formed, brown. No straining.      Denies fevers/chills, cough, sore throat, chest pain, SOB, headache, n/v, bowel & bladder issues.       Recommendations:   Instructed patient to seek care immediately for worsening symptoms, including: fever, chest pain, shortness of breath, dizziness.    Routed to high priority to care team.     Follow-Up:  1510 Per Astrid Santiago RNCC, I spoke with both Nathalie and Dr. Millard. Due to the sudden onset and description of pt pain, pt plans to go to the ER for evaluation/workup.

## 2022-08-22 NOTE — ED TRIAGE NOTES
Pt arrives with right sided rib cage pain and shortness of breath that started last Tuesday. Pain has increased since then. Pts palliative care team advised pt come to ED for evaluation.

## 2022-08-22 NOTE — ED NOTES
Pt placed back in lobby due to ED capacity. EKG ordered. Pt instructed to notify RN if changes in symptoms occur.

## 2022-08-23 ENCOUNTER — APPOINTMENT (OUTPATIENT)
Dept: CT IMAGING | Facility: CLINIC | Age: 57
DRG: 872 | End: 2022-08-23
Attending: EMERGENCY MEDICINE
Payer: MEDICARE

## 2022-08-23 PROBLEM — N12 PYELONEPHRITIS: Status: ACTIVE | Noted: 2022-08-23

## 2022-08-23 LAB
ALBUMIN SERPL BCG-MCNC: 3.2 G/DL (ref 3.5–5.2)
ALP SERPL-CCNC: 107 U/L (ref 35–104)
ALT SERPL W P-5'-P-CCNC: 39 U/L (ref 10–35)
ANION GAP SERPL CALCULATED.3IONS-SCNC: 7 MMOL/L (ref 7–15)
AST SERPL W P-5'-P-CCNC: 45 U/L (ref 10–35)
ATRIAL RATE - MUSE: 89 BPM
BASOPHILS # BLD AUTO: 0 10E3/UL (ref 0–0.2)
BASOPHILS NFR BLD AUTO: 0 %
BILIRUB SERPL-MCNC: 0.8 MG/DL
BUN SERPL-MCNC: 12 MG/DL (ref 6–20)
CALCIUM SERPL-MCNC: 8.3 MG/DL (ref 8.6–10)
CHLORIDE SERPL-SCNC: 101 MMOL/L (ref 98–107)
CREAT SERPL-MCNC: 0.93 MG/DL (ref 0.51–0.95)
CRP SERPL-MCNC: 63.9 MG/L
DEPRECATED HCO3 PLAS-SCNC: 27 MMOL/L (ref 22–29)
DIASTOLIC BLOOD PRESSURE - MUSE: NORMAL MMHG
EOSINOPHIL # BLD AUTO: 0 10E3/UL (ref 0–0.7)
EOSINOPHIL NFR BLD AUTO: 0 %
ERYTHROCYTE [DISTWIDTH] IN BLOOD BY AUTOMATED COUNT: 15.4 % (ref 10–15)
GFR SERPL CREATININE-BSD FRML MDRD: 72 ML/MIN/1.73M2
GLUCOSE SERPL-MCNC: 145 MG/DL (ref 70–99)
HCT VFR BLD AUTO: 34.4 % (ref 35–47)
HGB BLD-MCNC: 11 G/DL (ref 11.7–15.7)
IMM GRANULOCYTES # BLD: 0.1 10E3/UL
IMM GRANULOCYTES NFR BLD: 0 %
INTERPRETATION ECG - MUSE: NORMAL
LYMPHOCYTES # BLD AUTO: 0.8 10E3/UL (ref 0.8–5.3)
LYMPHOCYTES NFR BLD AUTO: 8 %
MAGNESIUM SERPL-MCNC: 2.1 MG/DL (ref 1.7–2.3)
MCH RBC QN AUTO: 30.1 PG (ref 26.5–33)
MCHC RBC AUTO-ENTMCNC: 32 G/DL (ref 31.5–36.5)
MCV RBC AUTO: 94 FL (ref 78–100)
MONOCYTES # BLD AUTO: 0.9 10E3/UL (ref 0–1.3)
MONOCYTES NFR BLD AUTO: 8 %
NEUTROPHILS # BLD AUTO: 9.4 10E3/UL (ref 1.6–8.3)
NEUTROPHILS NFR BLD AUTO: 84 %
NRBC # BLD AUTO: 0 10E3/UL
NRBC BLD AUTO-RTO: 0 /100
P AXIS - MUSE: -14 DEGREES
PHOSPHATE SERPL-MCNC: 4.2 MG/DL (ref 2.5–4.5)
PLATELET # BLD AUTO: 154 10E3/UL (ref 150–450)
POTASSIUM SERPL-SCNC: 4.3 MMOL/L (ref 3.4–5.3)
PR INTERVAL - MUSE: 118 MS
PROT SERPL-MCNC: 5.7 G/DL (ref 6.4–8.3)
QRS DURATION - MUSE: 74 MS
QT - MUSE: 364 MS
QTC - MUSE: 442 MS
R AXIS - MUSE: 52 DEGREES
RBC # BLD AUTO: 3.65 10E6/UL (ref 3.8–5.2)
SARS-COV-2 RNA RESP QL NAA+PROBE: NEGATIVE
SODIUM SERPL-SCNC: 135 MMOL/L (ref 136–145)
SYSTOLIC BLOOD PRESSURE - MUSE: NORMAL MMHG
T AXIS - MUSE: 31 DEGREES
VENTRICULAR RATE- MUSE: 89 BPM
WBC # BLD AUTO: 11.2 10E3/UL (ref 4–11)

## 2022-08-23 PROCEDURE — 86140 C-REACTIVE PROTEIN: CPT | Performed by: STUDENT IN AN ORGANIZED HEALTH CARE EDUCATION/TRAINING PROGRAM

## 2022-08-23 PROCEDURE — U0003 INFECTIOUS AGENT DETECTION BY NUCLEIC ACID (DNA OR RNA); SEVERE ACUTE RESPIRATORY SYNDROME CORONAVIRUS 2 (SARS-COV-2) (CORONAVIRUS DISEASE [COVID-19]), AMPLIFIED PROBE TECHNIQUE, MAKING USE OF HIGH THROUGHPUT TECHNOLOGIES AS DESCRIBED BY CMS-2020-01-R: HCPCS | Performed by: EMERGENCY MEDICINE

## 2022-08-23 PROCEDURE — G1010 CDSM STANSON: HCPCS

## 2022-08-23 PROCEDURE — 250N000013 HC RX MED GY IP 250 OP 250 PS 637: Performed by: PHYSICIAN ASSISTANT

## 2022-08-23 PROCEDURE — 250N000011 HC RX IP 250 OP 636

## 2022-08-23 PROCEDURE — 87077 CULTURE AEROBIC IDENTIFY: CPT | Performed by: PHYSICIAN ASSISTANT

## 2022-08-23 PROCEDURE — 74177 CT ABD & PELVIS W/CONTRAST: CPT | Mod: MG

## 2022-08-23 PROCEDURE — 120N000002 HC R&B MED SURG/OB UMMC

## 2022-08-23 PROCEDURE — 99222 1ST HOSP IP/OBS MODERATE 55: CPT | Mod: AI | Performed by: PHYSICIAN ASSISTANT

## 2022-08-23 PROCEDURE — 258N000003 HC RX IP 258 OP 636: Performed by: PHYSICIAN ASSISTANT

## 2022-08-23 PROCEDURE — 80053 COMPREHEN METABOLIC PANEL: CPT | Performed by: STUDENT IN AN ORGANIZED HEALTH CARE EDUCATION/TRAINING PROGRAM

## 2022-08-23 PROCEDURE — 250N000011 HC RX IP 250 OP 636: Performed by: PHYSICIAN ASSISTANT

## 2022-08-23 PROCEDURE — 96366 THER/PROPH/DIAG IV INF ADDON: CPT | Performed by: EMERGENCY MEDICINE

## 2022-08-23 PROCEDURE — C9803 HOPD COVID-19 SPEC COLLECT: HCPCS | Performed by: EMERGENCY MEDICINE

## 2022-08-23 PROCEDURE — 250N000011 HC RX IP 250 OP 636: Performed by: EMERGENCY MEDICINE

## 2022-08-23 PROCEDURE — 36415 COLL VENOUS BLD VENIPUNCTURE: CPT | Performed by: PHYSICIAN ASSISTANT

## 2022-08-23 PROCEDURE — 96367 TX/PROPH/DG ADDL SEQ IV INF: CPT | Performed by: EMERGENCY MEDICINE

## 2022-08-23 PROCEDURE — 96376 TX/PRO/DX INJ SAME DRUG ADON: CPT | Performed by: EMERGENCY MEDICINE

## 2022-08-23 PROCEDURE — 85025 COMPLETE CBC W/AUTO DIFF WBC: CPT | Performed by: STUDENT IN AN ORGANIZED HEALTH CARE EDUCATION/TRAINING PROGRAM

## 2022-08-23 PROCEDURE — 250N000011 HC RX IP 250 OP 636: Performed by: STUDENT IN AN ORGANIZED HEALTH CARE EDUCATION/TRAINING PROGRAM

## 2022-08-23 PROCEDURE — 99232 SBSQ HOSP IP/OBS MODERATE 35: CPT | Mod: GC | Performed by: FAMILY MEDICINE

## 2022-08-23 PROCEDURE — 83735 ASSAY OF MAGNESIUM: CPT | Performed by: STUDENT IN AN ORGANIZED HEALTH CARE EDUCATION/TRAINING PROGRAM

## 2022-08-23 PROCEDURE — 96365 THER/PROPH/DIAG IV INF INIT: CPT | Mod: 59 | Performed by: EMERGENCY MEDICINE

## 2022-08-23 PROCEDURE — 71275 CT ANGIOGRAPHY CHEST: CPT | Mod: 26 | Performed by: RADIOLOGY

## 2022-08-23 PROCEDURE — 96375 TX/PRO/DX INJ NEW DRUG ADDON: CPT | Performed by: EMERGENCY MEDICINE

## 2022-08-23 PROCEDURE — G1010 CDSM STANSON: HCPCS | Performed by: RADIOLOGY

## 2022-08-23 PROCEDURE — 36591 DRAW BLOOD OFF VENOUS DEVICE: CPT | Performed by: PHYSICIAN ASSISTANT

## 2022-08-23 PROCEDURE — 250N000013 HC RX MED GY IP 250 OP 250 PS 637: Performed by: STUDENT IN AN ORGANIZED HEALTH CARE EDUCATION/TRAINING PROGRAM

## 2022-08-23 PROCEDURE — 74177 CT ABD & PELVIS W/CONTRAST: CPT | Mod: 26 | Performed by: RADIOLOGY

## 2022-08-23 PROCEDURE — 87149 DNA/RNA DIRECT PROBE: CPT | Performed by: PHYSICIAN ASSISTANT

## 2022-08-23 PROCEDURE — 36415 COLL VENOUS BLD VENIPUNCTURE: CPT | Performed by: STUDENT IN AN ORGANIZED HEALTH CARE EDUCATION/TRAINING PROGRAM

## 2022-08-23 PROCEDURE — 84100 ASSAY OF PHOSPHORUS: CPT | Performed by: STUDENT IN AN ORGANIZED HEALTH CARE EDUCATION/TRAINING PROGRAM

## 2022-08-23 RX ORDER — ONDANSETRON 4 MG/1
4 TABLET, ORALLY DISINTEGRATING ORAL EVERY 6 HOURS PRN
Status: DISCONTINUED | OUTPATIENT
Start: 2022-08-23 | End: 2022-08-23

## 2022-08-23 RX ORDER — AMOXICILLIN 250 MG
1 CAPSULE ORAL 2 TIMES DAILY PRN
Status: DISCONTINUED | OUTPATIENT
Start: 2022-08-23 | End: 2022-08-26 | Stop reason: HOSPADM

## 2022-08-23 RX ORDER — MULTIPLE VITAMINS W/ MINERALS TAB 9MG-400MCG
1 TAB ORAL DAILY
Status: CANCELLED | OUTPATIENT
Start: 2022-08-23

## 2022-08-23 RX ORDER — FAMOTIDINE 20 MG/1
20 TABLET, FILM COATED ORAL 2 TIMES DAILY PRN
Status: DISCONTINUED | OUTPATIENT
Start: 2022-08-23 | End: 2022-08-24

## 2022-08-23 RX ORDER — SODIUM CHLORIDE, SODIUM LACTATE, POTASSIUM CHLORIDE, CALCIUM CHLORIDE 600; 310; 30; 20 MG/100ML; MG/100ML; MG/100ML; MG/100ML
INJECTION, SOLUTION INTRAVENOUS CONTINUOUS
Status: DISCONTINUED | OUTPATIENT
Start: 2022-08-23 | End: 2022-08-23

## 2022-08-23 RX ORDER — HEPARIN SODIUM,PORCINE 10 UNIT/ML
VIAL (ML) INTRAVENOUS
Status: COMPLETED
Start: 2022-08-23 | End: 2022-08-23

## 2022-08-23 RX ORDER — PIPERACILLIN SODIUM, TAZOBACTAM SODIUM 3; .375 G/15ML; G/15ML
3.38 INJECTION, POWDER, LYOPHILIZED, FOR SOLUTION INTRAVENOUS ONCE
Status: COMPLETED | OUTPATIENT
Start: 2022-08-23 | End: 2022-08-23

## 2022-08-23 RX ORDER — FLUTICASONE PROPIONATE 50 MCG
1 SPRAY, SUSPENSION (ML) NASAL DAILY
Status: DISCONTINUED | OUTPATIENT
Start: 2022-08-23 | End: 2022-08-26 | Stop reason: HOSPADM

## 2022-08-23 RX ORDER — SUMATRIPTAN 100 MG/1
100 TABLET, FILM COATED ORAL
Status: DISCONTINUED | OUTPATIENT
Start: 2022-08-23 | End: 2022-08-26 | Stop reason: HOSPADM

## 2022-08-23 RX ORDER — LIDOCAINE 4 G/G
2 PATCH TOPICAL
Status: DISCONTINUED | OUTPATIENT
Start: 2022-08-23 | End: 2022-08-26 | Stop reason: HOSPADM

## 2022-08-23 RX ORDER — FAMOTIDINE 20 MG/1
20 TABLET, FILM COATED ORAL 2 TIMES DAILY
Status: CANCELLED | OUTPATIENT
Start: 2022-08-23

## 2022-08-23 RX ORDER — PANTOPRAZOLE SODIUM 40 MG/1
40 TABLET, DELAYED RELEASE ORAL DAILY
Status: CANCELLED | OUTPATIENT
Start: 2022-08-23

## 2022-08-23 RX ORDER — ONDANSETRON 4 MG/1
8 TABLET, FILM COATED ORAL EVERY 8 HOURS PRN
Status: CANCELLED | OUTPATIENT
Start: 2022-08-23

## 2022-08-23 RX ORDER — OXYCODONE HYDROCHLORIDE 5 MG/1
5 TABLET ORAL EVERY 6 HOURS PRN
Status: DISCONTINUED | OUTPATIENT
Start: 2022-08-23 | End: 2022-08-23

## 2022-08-23 RX ORDER — NALOXONE HYDROCHLORIDE 0.4 MG/ML
0.2 INJECTION, SOLUTION INTRAMUSCULAR; INTRAVENOUS; SUBCUTANEOUS
Status: DISCONTINUED | OUTPATIENT
Start: 2022-08-23 | End: 2022-08-26 | Stop reason: HOSPADM

## 2022-08-23 RX ORDER — CYCLOBENZAPRINE HCL 5 MG
10 TABLET ORAL 3 TIMES DAILY PRN
Status: CANCELLED | OUTPATIENT
Start: 2022-08-23

## 2022-08-23 RX ORDER — AMITRIPTYLINE HYDROCHLORIDE 75 MG/1
75 TABLET ORAL AT BEDTIME
Status: CANCELLED | OUTPATIENT
Start: 2022-08-23

## 2022-08-23 RX ORDER — CYCLOBENZAPRINE HCL 10 MG
10 TABLET ORAL 3 TIMES DAILY PRN
Status: DISCONTINUED | OUTPATIENT
Start: 2022-08-23 | End: 2022-08-26 | Stop reason: HOSPADM

## 2022-08-23 RX ORDER — PIPERACILLIN SODIUM, TAZOBACTAM SODIUM 3; .375 G/15ML; G/15ML
3.38 INJECTION, POWDER, LYOPHILIZED, FOR SOLUTION INTRAVENOUS EVERY 6 HOURS
Status: DISCONTINUED | OUTPATIENT
Start: 2022-08-23 | End: 2022-08-23

## 2022-08-23 RX ORDER — HYDROMORPHONE HYDROCHLORIDE 1 MG/ML
0.5 INJECTION, SOLUTION INTRAMUSCULAR; INTRAVENOUS; SUBCUTANEOUS
Status: DISCONTINUED | OUTPATIENT
Start: 2022-08-23 | End: 2022-08-23

## 2022-08-23 RX ORDER — ONDANSETRON 4 MG/1
4 TABLET, ORALLY DISINTEGRATING ORAL EVERY 6 HOURS PRN
Status: DISCONTINUED | OUTPATIENT
Start: 2022-08-23 | End: 2022-08-26 | Stop reason: HOSPADM

## 2022-08-23 RX ORDER — HYDROMORPHONE HCL IN WATER/PF 6 MG/30 ML
0.4 PATIENT CONTROLLED ANALGESIA SYRINGE INTRAVENOUS EVERY 4 HOURS PRN
Status: DISCONTINUED | OUTPATIENT
Start: 2022-08-23 | End: 2022-08-24

## 2022-08-23 RX ORDER — PROCHLORPERAZINE 25 MG
25 SUPPOSITORY, RECTAL RECTAL EVERY 12 HOURS PRN
Status: DISCONTINUED | OUTPATIENT
Start: 2022-08-23 | End: 2022-08-26 | Stop reason: HOSPADM

## 2022-08-23 RX ORDER — LANOLIN ALCOHOL/MO/W.PET/CERES
1000 CREAM (GRAM) TOPICAL DAILY
Status: DISCONTINUED | OUTPATIENT
Start: 2022-08-23 | End: 2022-08-26 | Stop reason: HOSPADM

## 2022-08-23 RX ORDER — NALOXONE HYDROCHLORIDE 0.4 MG/ML
0.4 INJECTION, SOLUTION INTRAMUSCULAR; INTRAVENOUS; SUBCUTANEOUS
Status: DISCONTINUED | OUTPATIENT
Start: 2022-08-23 | End: 2022-08-26 | Stop reason: HOSPADM

## 2022-08-23 RX ORDER — ONDANSETRON 2 MG/ML
4 INJECTION INTRAMUSCULAR; INTRAVENOUS EVERY 6 HOURS PRN
Status: DISCONTINUED | OUTPATIENT
Start: 2022-08-23 | End: 2022-08-26 | Stop reason: HOSPADM

## 2022-08-23 RX ORDER — PROCHLORPERAZINE 25 MG
25 SUPPOSITORY, RECTAL RECTAL EVERY 12 HOURS PRN
Status: DISCONTINUED | OUTPATIENT
Start: 2022-08-23 | End: 2022-08-23

## 2022-08-23 RX ORDER — PROCHLORPERAZINE MALEATE 10 MG
10 TABLET ORAL EVERY 6 HOURS PRN
Status: DISCONTINUED | OUTPATIENT
Start: 2022-08-23 | End: 2022-08-23

## 2022-08-23 RX ORDER — IBUPROFEN 200 MG
500 CAPSULE ORAL 3 TIMES DAILY
Status: DISCONTINUED | OUTPATIENT
Start: 2022-08-23 | End: 2022-08-23

## 2022-08-23 RX ORDER — SIMETHICONE 80 MG
TABLET,CHEWABLE ORAL EVERY 6 HOURS PRN
Status: DISCONTINUED | OUTPATIENT
Start: 2022-08-23 | End: 2022-08-26 | Stop reason: HOSPADM

## 2022-08-23 RX ORDER — FLUTICASONE PROPIONATE 50 MCG
1 SPRAY, SUSPENSION (ML) NASAL DAILY
Status: CANCELLED | OUTPATIENT
Start: 2022-08-23

## 2022-08-23 RX ORDER — PROCHLORPERAZINE MALEATE 5 MG
10 TABLET ORAL EVERY 6 HOURS PRN
Status: DISCONTINUED | OUTPATIENT
Start: 2022-08-23 | End: 2022-08-26 | Stop reason: HOSPADM

## 2022-08-23 RX ORDER — MULTIPLE VITAMINS W/ MINERALS TAB 9MG-400MCG
1 TAB ORAL DAILY
Status: DISCONTINUED | OUTPATIENT
Start: 2022-08-23 | End: 2022-08-26 | Stop reason: HOSPADM

## 2022-08-23 RX ORDER — MEROPENEM 1 G/1
1 INJECTION, POWDER, FOR SOLUTION INTRAVENOUS EVERY 8 HOURS
Status: DISCONTINUED | OUTPATIENT
Start: 2022-08-23 | End: 2022-08-25

## 2022-08-23 RX ORDER — OXYCODONE HYDROCHLORIDE 5 MG/1
5-10 TABLET ORAL EVERY 4 HOURS PRN
Status: DISCONTINUED | OUTPATIENT
Start: 2022-08-23 | End: 2022-08-26 | Stop reason: HOSPADM

## 2022-08-23 RX ORDER — HEPARIN SODIUM,PORCINE 10 UNIT/ML
VIAL (ML) INTRAVENOUS
Status: DISPENSED
Start: 2022-08-23 | End: 2022-08-24

## 2022-08-23 RX ORDER — ACETAMINOPHEN 325 MG/1
650 TABLET ORAL EVERY 4 HOURS PRN
Status: DISCONTINUED | OUTPATIENT
Start: 2022-08-23 | End: 2022-08-26 | Stop reason: HOSPADM

## 2022-08-23 RX ORDER — AMOXICILLIN 250 MG
2 CAPSULE ORAL 2 TIMES DAILY PRN
Status: DISCONTINUED | OUTPATIENT
Start: 2022-08-23 | End: 2022-08-23

## 2022-08-23 RX ORDER — ONDANSETRON 2 MG/ML
4 INJECTION INTRAMUSCULAR; INTRAVENOUS EVERY 6 HOURS PRN
Status: DISCONTINUED | OUTPATIENT
Start: 2022-08-23 | End: 2022-08-23

## 2022-08-23 RX ORDER — LIDOCAINE 40 MG/G
CREAM TOPICAL
Status: DISCONTINUED | OUTPATIENT
Start: 2022-08-23 | End: 2022-08-26 | Stop reason: HOSPADM

## 2022-08-23 RX ORDER — HYDROMORPHONE HYDROCHLORIDE 1 MG/ML
0.5 INJECTION, SOLUTION INTRAMUSCULAR; INTRAVENOUS; SUBCUTANEOUS EVERY 4 HOURS PRN
Status: DISCONTINUED | OUTPATIENT
Start: 2022-08-23 | End: 2022-08-23

## 2022-08-23 RX ORDER — PANTOPRAZOLE SODIUM 40 MG/1
40 TABLET, DELAYED RELEASE ORAL DAILY
Status: DISCONTINUED | OUTPATIENT
Start: 2022-08-23 | End: 2022-08-24

## 2022-08-23 RX ORDER — HYDROXYZINE PAMOATE 25 MG/1
25-50 CAPSULE ORAL 4 TIMES DAILY PRN
Status: CANCELLED | OUTPATIENT
Start: 2022-08-23

## 2022-08-23 RX ORDER — ENOXAPARIN SODIUM 100 MG/ML
40 INJECTION SUBCUTANEOUS EVERY 24 HOURS
Status: CANCELLED | OUTPATIENT
Start: 2022-08-23

## 2022-08-23 RX ORDER — HYDROXYZINE HYDROCHLORIDE 25 MG/1
25-50 TABLET, FILM COATED ORAL 4 TIMES DAILY PRN
Status: DISCONTINUED | OUTPATIENT
Start: 2022-08-23 | End: 2022-08-26 | Stop reason: HOSPADM

## 2022-08-23 RX ORDER — HYDROMORPHONE HCL IN WATER/PF 6 MG/30 ML
0.2 PATIENT CONTROLLED ANALGESIA SYRINGE INTRAVENOUS EVERY 4 HOURS PRN
Status: DISCONTINUED | OUTPATIENT
Start: 2022-08-23 | End: 2022-08-23

## 2022-08-23 RX ORDER — DULOXETIN HYDROCHLORIDE 60 MG/1
60 CAPSULE, DELAYED RELEASE ORAL DAILY
Status: DISCONTINUED | OUTPATIENT
Start: 2022-08-23 | End: 2022-08-26 | Stop reason: HOSPADM

## 2022-08-23 RX ADMIN — HYDROXYZINE HYDROCHLORIDE 25 MG: 25 TABLET, FILM COATED ORAL at 22:16

## 2022-08-23 RX ADMIN — OXYCODONE HYDROCHLORIDE 10 MG: 5 TABLET ORAL at 22:07

## 2022-08-23 RX ADMIN — DULOXETINE 60 MG: 60 CAPSULE, DELAYED RELEASE ORAL at 10:06

## 2022-08-23 RX ADMIN — HYDROMORPHONE HYDROCHLORIDE 0.4 MG: 0.2 INJECTION, SOLUTION INTRAMUSCULAR; INTRAVENOUS; SUBCUTANEOUS at 16:15

## 2022-08-23 RX ADMIN — MEROPENEM 1 G: 1 INJECTION, POWDER, FOR SOLUTION INTRAVENOUS at 10:06

## 2022-08-23 RX ADMIN — Medication 10 UNITS: at 20:49

## 2022-08-23 RX ADMIN — HYDROMORPHONE HYDROCHLORIDE 0.5 MG: 1 INJECTION, SOLUTION INTRAMUSCULAR; INTRAVENOUS; SUBCUTANEOUS at 10:18

## 2022-08-23 RX ADMIN — AMITRIPTYLINE HYDROCHLORIDE 75 MG: 25 TABLET, FILM COATED ORAL at 22:06

## 2022-08-23 RX ADMIN — PANTOPRAZOLE SODIUM 40 MG: 40 TABLET, DELAYED RELEASE ORAL at 10:06

## 2022-08-23 RX ADMIN — Medication 1 TABLET: at 10:06

## 2022-08-23 RX ADMIN — OXYCODONE HYDROCHLORIDE 5 MG: 5 TABLET ORAL at 07:58

## 2022-08-23 RX ADMIN — OXYCODONE HYDROCHLORIDE 10 MG: 5 TABLET ORAL at 17:50

## 2022-08-23 RX ADMIN — SUMATRIPTAN SUCCINATE 100 MG: 100 TABLET, FILM COATED ORAL at 23:18

## 2022-08-23 RX ADMIN — SODIUM CHLORIDE, POTASSIUM CHLORIDE, SODIUM LACTATE AND CALCIUM CHLORIDE: 600; 310; 30; 20 INJECTION, SOLUTION INTRAVENOUS at 11:27

## 2022-08-23 RX ADMIN — FLUTICASONE PROPIONATE 1 SPRAY: 50 SPRAY, METERED NASAL at 10:06

## 2022-08-23 RX ADMIN — PROCHLORPERAZINE EDISYLATE 5 MG: 5 INJECTION INTRAMUSCULAR; INTRAVENOUS at 02:03

## 2022-08-23 RX ADMIN — HYDROMORPHONE HYDROCHLORIDE 0.5 MG: 1 INJECTION, SOLUTION INTRAMUSCULAR; INTRAVENOUS; SUBCUTANEOUS at 02:03

## 2022-08-23 RX ADMIN — PIPERACILLIN AND TAZOBACTAM 3.38 G: 3; .375 INJECTION, POWDER, LYOPHILIZED, FOR SOLUTION INTRAVENOUS at 02:03

## 2022-08-23 RX ADMIN — HYDROMORPHONE HYDROCHLORIDE 0.5 MG: 1 INJECTION, SOLUTION INTRAMUSCULAR; INTRAVENOUS; SUBCUTANEOUS at 05:50

## 2022-08-23 RX ADMIN — HYDROXYZINE HYDROCHLORIDE 25 MG: 25 TABLET, FILM COATED ORAL at 17:51

## 2022-08-23 RX ADMIN — OXYCODONE HYDROCHLORIDE 10 MG: 5 TABLET ORAL at 13:11

## 2022-08-23 RX ADMIN — CYANOCOBALAMIN TAB 1000 MCG 1000 MCG: 1000 TAB at 10:06

## 2022-08-23 RX ADMIN — FAMOTIDINE 20 MG: 20 TABLET ORAL at 10:06

## 2022-08-23 RX ADMIN — MEROPENEM 1 G: 1 INJECTION, POWDER, FOR SOLUTION INTRAVENOUS at 16:18

## 2022-08-23 RX ADMIN — IOPAMIDOL 111 ML: 755 INJECTION, SOLUTION INTRAVENOUS at 00:03

## 2022-08-23 ASSESSMENT — ENCOUNTER SYMPTOMS
WHEEZING: 0
VOMITING: 0
DIARRHEA: 0
FLANK PAIN: 1
PSYCHIATRIC NEGATIVE: 1
CONSTITUTIONAL NEGATIVE: 1
ABDOMINAL PAIN: 1
SENSORY CHANGE: 1
DYSURIA: 1
BACK PAIN: 1
HEMATURIA: 1
COUGH: 0
NAUSEA: 1
HEADACHES: 1

## 2022-08-23 ASSESSMENT — ACTIVITIES OF DAILY LIVING (ADL)
ADLS_ACUITY_SCORE: 35
DEPENDENT_IADLS:: INDEPENDENT
ADLS_ACUITY_SCORE: 35

## 2022-08-23 NOTE — UTILIZATION REVIEW
Admission Status; Secondary Review Determination     Admission Date: 8/22/2022  7:50 PM       Under the authority of the Utilization Management Committee, the utilization review process indicated a secondary review on the above patient.  The review outcome is based on review of the medical records, discussions with staff, and applying clinical experience noted on the date of the review.        (x)      Inpatient Status Appropriate - This patient's medical care is consistent with medical management for inpatient care and reasonable inpatient medical practice.       RATIONALE FOR DETERMINATION      Brief clinical presentation, information copied from the chart, abbreviated and edited for relevant content:     Nathalie Bashir is a 56 year old F with PMHx of gastric adenocarcinoma s/p chemotherapy, s/p Whipple, gastric bypass, and partial gastrectomy (2020), in surveillance, chemotherapy-induced neuropathy, chronic pain on chronic opioids, migraines, depression, GERD, chronic constipation, recurrent MDR UTIs, who presented with R sided chest pain, abdominal pain and flank pain admitted on 8/23/22 for pyelonephritis. Has History of multi-drug resistant organisms. UA grossly + with imaging consistent with pyelo, leukocytosis. Patient with recurrent UTI since 4/2022, s/p treatment with most recent UCx 8/4/22 grew ESBL E coli. Follows with urology outpatient for recurrent UTIs. Has cystoscopy 4/28/22 which was without abnormality. Patient reported having mild R flank pain with prior UTIs, so possibly had pyelonephritis previously  that was not adequately treated leading to recurrent infections. Plan for Blood cultures, follow up blood cultures. Meropenam 1 gram IV Q8H and narrow as able once cultures return. Continue IVF and IV pain management. Patient may benefit from starting Hiprex and Vit C for prevention and close follow up with urology       At the time of admission with the information available to the attending  physician, more than 2 nights hospital complex care was anticipated. Also, there was a risk of adverse outcome if patient was treated outpatient or observation. High intensity of services anticipated. Inpatient admission appropriate based on Medicare guidelines.       The information on this document is developed by the utilization review team in order for the business office to ensure compliance.  This only denotes the appropriateness of proper admission status and does not reflect the quality of care rendered.         The definitions of Inpatient Status and Observation Status used in making the determination above are those provided in the CMS Coverage Manual, Chapter 1 and Chapter 6, section 70.4.      Sincerely,      Sofie Puentes MD   Utilization Review/ Case Management  [facility].

## 2022-08-23 NOTE — H&P
[facility]    History and Physical - Hospitalist Service, GOLD TEAM 1       Date of Admission:  8/22/2022    Assessment & Plan      Nathalie Bashir is a 56 year old F with PMHx of gastric adenocarcinoma s/p chemotherapy, s/p Whipple, gastric bypass, and partial gastrectomy (2020), in surveillance, chemotherapy-induced neuropathy, chronic pain on chronic opioids, migraines, depression, GERD, chronic constipation, recurrent MDR UTIs, who presented with R sided chest pain, abdominal pain and flank pain admitted on 8/23/22 for pyelonephritis.    # Pyelonephritis  # History of recurrent UTIs  # History of multi-drug resistant organisms  Presenting with R flank pain, dysuria, intermittent hematuria, UA grossly + with imaging consistent with pyelo. Afebrile. + leukocytosis.  Not meeting SIRS criteria. Patient with recurrent UTI since 4/2022, s/p treatment with most recent UCx 8/4/22 grew ESBL E coli. Follows with urology outpatient for recurrent UTIs. Has cystoscopy 4/28/22 which was without abnormality. Patient reported having mild R flank pain with prior UTIs, so possibly had pyelonephritis previously  that was not adequately treated leading to recurrent infections.   - Blood cultures, follow up blood cultures  - Meropenam 1 gram IV Q8H and narrow as able once cultures return   - Zofran and compazine PRN   - Continue LR at 100 ml/hr for 1L total  - Pain management: IV Dilaudid 0.5 mg Q4H PRN, oxycodone 5-10 mg Q4H PRN.  - Patient may benefit from starting Hiprex and Vit C for prevention and close follow up with urology     # Transaminitis - mild with alk phos 107, ALT 39, AST 45. Normal T bili. CT abd/pelvis with Postcholecystectomy. Biliary enteric anastomosis. Mild pneumobilia. No acute process.   - Trend LFTs     # Hyponatremia - mild at 135. Possibly hypovolemic in setting of infection and nausea. IVF as noted above. Trend BMP.     # Pulmonary infiltrates, left lower  lobe  # Pulmonary nodules  Patient noted R sided lower chest pain more consistent with pyelonephritis. Imaging on admission with LLL infiltrates. Pulmonary nodules unchanged from prior. Patient denies any respiratory symptoms.   - Monitor for development of any respiratory symptoms. Continue incentive spirometry     # History of gastric cancer, S/p Whipple Procedure   Incidentally found during Whipple 1/2020 for presumed IPMN, s/p neoadjuvant chemotherapy and curative intent surgery 10/2020. Last saw oncology 7/27/22. In surveillance period with plan for repeat CT CAP Q6 months. See more detailed history in Oncology 7/27/22 and PM&R note 6/22/22.  - Outpatient follow up with oncology as previously scheduled    # Chronic pain (hip, neck, neuropathy) Follows with palliative, neurology, and PM&R outpatient.   - Continue PTA regimen amitriptyline 75 mg daily, duloxetine 60 mg daily, flexeril 10 mg TID and atarax PRN  - Outpatient patient takes oxycodone 5 mg QID PRN. Acute opiate regimen as noted above   - tylenol, gabapentin, pregabalin, toradol, naproxen listed as allergy. Avoiding NSAIDs for h/o gastric bypass  - ice/heat    # Normocytic Anemia, chronic- Hgb 11.1 on admission. Baseline 11-12. Trend CBC    # Colonic diverticulosis - Incidentally noted on CT abdomen pelvis. Has been seen on previous CTs.     # Migraines with aura - Continue PTA sumatriptan 100 mg PRN     # Chronic constipation - Continue PTA senna and simethicone  # Possible hx of Crohn's disease? - Mentioned in history, but not mentioned in GI notes, last colonoscopy in 2018 with diverticulosis but no other abnormality or biopsy for diagnosis.     # GERD - Continue PTA pantoprazole 40 mg daily and famotidine 20 mg BID prn    # Seasonal allergies - PTA flonase daily       Diet: Combination Diet Regular Diet Adult; Other - please commentRegular, gluten free  DVT Prophylaxis: Low Risk/Ambulatory with no VTE prophylaxis indicated and Ambulate every shift    Hernandez Catheter: Not present  Central Lines: PRESENT       Cardiac Monitoring: None  Code Status: Full CodeFULL CODE    Clinically Significant Risk Factors Present on Admission             # Hypoalbuminemia: Albumin = 3.2 g/dL (Ref range: 3.5 - 5.2 g/dL) on admission, will monitor as appropriate          Disposition Plan    possibly 3 days, TBD on culture data, may need IV abx on discharge.      The patient's care was discussed with the Attending Physician, Dr. Bucky Trujillo, Bedside Nurse and Patient.    Mervat Aviles PA-C  Hospitalist Service, [facility] TEAM [address] [place] [address]  Securely message with the Vocera Web Console (learn more here)  Text page via [facility] Paging/Directory       ______________________________________________________________________    Chief Complaint    Chest and abdominal pain    History is obtained from the patient and per medical records    History of Present Illness   Nathalie Bashir is a 56 year old F with PMHx of gastric adenocarcinoma s/p chemotherapy, s/p Whipple, gastric bypass, and partial gastrectomy (2020), in surveillance, chemotherapy-induced neuropathy, chronic pain on chronic opioids, migraines, depression, GERD, chronic constipation, recurrent MDR UTIs, who presented with R sided chest pain, abdominal pain and flank pain admitted on 8/23/22 for pyelonephritis.    Patient reports since April of this year having increased frequency of UTIs. Prior to this she would get UTI 2-3 times a year. States since April she has had 5, treated with multiple different antibiotics as noted below. States she has completed the abx each course, which would improve her symptoms, but then the symptoms would return within 1 week.     Patient report developing R flank pain 1 week ago, that radiated to R lower chest and R upper quadrant of abdomen, associated with intermittent dysuria and an episode of hematuria 4 days ago. Her flank pain worsened and became  severe which is why she presented to the ED. Patient states she has had similar flank pain with her prior UTI. Denies any fevers. Endorses nausea, but no vomiting. Denies any diarrhea, with last BM yesterday. Patient noted some SOB, but feels it is due to inability to take a deep breath from her flank pain. Jayesh any coughing.     History of recurrent UTIs and follows with urology outpatient. Has increased urinary frequency and nocturia at baseline. Culture proven E. Coli UTIs in 11/14/2021 and 12/27/2021 with [facility].  Treated for presumed recurrent UTI on 2/25/2022 by her PCP during a virtual visit, but UA/UC was not obtained. PVR 4/14/22 was 0. Has cystoscopy 4/28/22 which was without abnormality.      Urine culture 3/18/22 grew <10k urogenital yasmine  Urine culture 4/14/2022 grew ESBL E. Coli resistant to ampicillin and gentamicin.  Completed bactrim DS BID for 5 days.  Urine culture 5/9/2022 grew MDR ESBL E. Coli (resistant to bactrim). She was prescribed cipro 500 mg BID for 5 days.  Urine culture 6/3/2022 grew MDR ESBL E. Coli (resistant to cipro). She was prescribed nitrofurantoin 100 mg BID for 5 days.  Urine culture 7/7/2022 grew Klebsiella oxytoca, pansensitive except for ampicillin.  She was prescribed bactrim DS BID for 5 days.  Urine culture 8/4/2022 grew MDR ESBL E. Coli resistant to bactrim and others, and susceptible to amikacin, cefoxitin, levofloxacin, meropenem, nitrofurantoin, and pip/tazo. She was prescribed nitrofurantoin 100 mg BID for 5 days.      ED Course:  Labs: CBC, CMP, D-dimer (wnl), lipase, UA, urine culture, COVID  Notable labs: UA with RBCs, bacteria, few budding yeast, leukocyte esterase, WBCs  Imaging: CT PE negative for PE, notable for mild infectious or inflammatory nodular infiltrate in the left lower lobe. CT abdomen pelvis with contrast notable for cystitis, ascending UTI with right-sided pyelonephritis; status post Whipple procedure  Medications: Oxycodone 5 mg,  hydromorphone 0.5 mg, Zosyn @ 0203, prochlorperazine,  ECG: normal sinus rhythm with PACs      Review of Systems      Review of Systems   Constitutional: Negative.    HENT: Negative.    Respiratory: Negative for cough and wheezing.    Cardiovascular: Positive for chest pain.   Gastrointestinal: Positive for abdominal pain and nausea. Negative for diarrhea and vomiting.   Genitourinary: Positive for dysuria, flank pain and hematuria.   Musculoskeletal: Positive for back pain.   Skin: Negative.    Neurological: Positive for sensory change and headaches.   Psychiatric/Behavioral: Negative.        Past Medical History    I have reviewed this patient's medical history and updated it with pertinent information if needed.   Past Medical History:   Diagnosis Date     Allergic rhinitis      Cancer (H)     stomach cancer     Chronic pain      Depression      Gastric adenocarcinoma (H)      Lumbago      Migraine      Opioid dependence (H)      Other chronic pain     low back     Sacroiliitis (H)     low back pain     Trochanteric bursitis     leg length discrrepancy, hip pain        Past Surgical History   I have reviewed this patient's surgical history and updated it with pertinent information if needed.  Past Surgical History:   Procedure Laterality Date     ARTHROPLASTY HIP Left 2016     BACK SURGERY      L4-5 decompression     CATARACT IOL, RT/LT       CHOLECYSTECTOMY N/A 1/28/2020    Procedure: Cholecystectomy;  Surgeon: Yandel Mallory MD;  Location: [facility] OR     ENDOSCOPIC RETROGRADE CHOLANGIOPANCREATOGRAM N/A 7/27/2020    Procedure: ENDOSCOPIC RETROGRADE CHOLANGIOPANCREATOGRAPHY  **Latex Allergy** with jejunal biopsies;  Surgeon: Jeet Aviles MD;  Location:  OR     ESOPHAGOSCOPY, GASTROSCOPY, DUODENOSCOPY (EGD), COMBINED N/A 3/3/2020    Procedure: ESOPHAGOGASTRODUODENOSCOPY, WITH ENDOSCOPIC US (enoxaparin);  Surgeon: Bakari Plata MD;  Location:  GI     ESOPHAGOSCOPY, GASTROSCOPY, DUODENOSCOPY (EGD),  COMBINED N/A 12/7/2020    Procedure: Upper Endoscopy with stent placement;  Surgeon: Jeet Aviles MD;  Location:  OR     ESOPHAGOSCOPY, GASTROSCOPY, DUODENOSCOPY (EGD), COMBINED N/A 2/8/2021    Procedure: ESOPHAGOGASTRODUODENOSCOPY (EGD)AND STENT REMOVAL;  Surgeon: Jeet Aviles MD;  Location:  OR     EYE SURGERY       GASTRECTOMY N/A 10/21/2020    Procedure: Open subtotal gastectomy with David en Y Reconstruction; D1 Lymph Node Disection  **Latex Allergy**;  Surgeon: Yandel Mallory MD;  Location: [facility] OR     IR CHEST PORT PLACEMENT > 5 YRS OF AGE  3/26/2020     IR LUMBAR EPIDURAL STEROID INJECTION  8/25/2009     IR LUMBAR EPIDURAL STEROID INJECTION  9/2/2009     IR LUMBAR EPIDURAL STEROID INJECTION  9/25/2009     IR LUMBAR EPIDURAL STEROID INJECTION  11/23/2009     IR LUMBAR EPIDURAL STEROID INJECTION  4/1/2010     IR LUMBAR EPIDURAL STEROID INJECTION  9/1/2010     IR LUMBAR EPIDURAL STEROID INJECTION  3/10/2011     IR LUMBAR EPIDURAL STEROID INJECTION  8/30/2011     LAPAROSCOPY DIAGNOSTIC (GENERAL) N/A 10/13/2020    Procedure: Diagnostic laparoscopy with peritoneal washings  **Latex Allergy**;  Surgeon: Yandel Mallory MD;  Location: [facility] OR     OPEN REDUCTION INTERNAL FIXATION RODDING INTRAMEDULLARY FEMUR Left 12/23/2014    Procedure: OPEN REDUCTION INTERNAL FIXATION RODDING INTRAMEDULLARY FEMUR;  Surgeon: Arnol Santiago MD;  Location:  OR     ORTHOPEDIC SURGERY       PICC DOUBLE LUMEN PLACEMENT Right 02/07/2020    5 fr double lumen 41 cm     NV LAMINEC/FACETECT/FORAMIN,LUMBAR 1 SEG      Description: Laminectomy Decompress, Facetectomy, Foraminotomy Lumbar Seg;  Recorded: 04/12/2012;     REMOVE HARDWARE LOWER EXTREMITY Left 4/7/2015    Procedure: REMOVE HARDWARE LOWER EXTREMITY;  Surgeon: Arnol Santiago MD;  Location: [facility] OR     REMOVE HARDWARE RODDING INTRAMEDULLARY FEMUR Left 12/17/2015    Procedure: REMOVE HARDWARE RODDING INTRAMEDULLARY FEMUR;  Surgeon: Arnol Santiago MD;   Location: [facility] OR     RESECT BONE LOWER EXTREMITY Left 12/23/2014    Procedure: RESECT BONE LOWER EXTREMITY;  Surgeon: Arnol Santiago MD;  Location: [facility] OR     SHORTENING OSTEOPLASTY FEMUR W/ IM NAILING       WHIPPLE PROCEDURE N/A 1/28/2020    Procedure: Open Whipple procedure and Cholecystectomy;  Surgeon: Yandel Mallory MD;  Location: [facility] OR     WHIPPLE PROCEDURE       ZZC FUSION OF SACROILIAC JOINT      Description: Arthrodesis Sacroiliac Joint Left;  Recorded: 10/07/2011;     ZZC REPAIR DETACH RETINA,SCLERAL BUCKLE          Social History   I have reviewed this patient's social history and updated it with pertinent information if needed. Nathalie Bashir  reports that she quit smoking about 2 years ago. Her smoking use included cigarettes. She has a 12.50 pack-year smoking history. She has never used smokeless tobacco. She reports that she does not drink alcohol and does not use drugs.    Family History   I have reviewed this patient's family history and updated it with pertinent information if needed.  Family History   Problem Relation Age of Onset     Heart Failure Mother 77     Dementia Father         frontal lobe     No Known Problems Sister      No Known Problems Brother      Anesthesia Reaction No family hx of      Deep Vein Thrombosis No family hx of        Prior to Admission Medications   Prior to Admission Medications   Prescriptions Last Dose Informant Patient Reported? Taking?   Cyanocobalamin 1000 MCG CAPS   Yes No   Sig: Take by mouth daily   DULoxetine (CYMBALTA) 30 MG capsule   No No   Sig: Take 3 capsules (90 mg) by mouth daily   SUMAtriptan (IMITREX) 100 MG tablet   Yes No   Sig: Take 100 mg by mouth at onset of headache for migraine   amitriptyline (ELAVIL) 75 MG tablet   No No   Sig: Take 1 tablet (75 mg) by mouth At Bedtime   calcium carbonate 500 mg, elemental, (OSCAL 500) 1250 (500 Ca) MG TABS tablet   Yes No   Sig: Take by mouth 3 times daily   cyclobenzaprine (FLEXERIL) 10 MG tablet   No  No   Sig: Take 1 tablet (10 mg) by mouth 3 times daily as needed for muscle spasms   famotidine (PEPCID) 20 MG tablet   No No   Sig: TAKE 1 TABLET (20 MG) BY MOUTH 2 TIMES DAILY AS NEEDED   fluticasone (FLONASE) 50 MCG/ACT nasal spray   Yes No   Sig: Spray 1 spray into both nostrils daily   hydrOXYzine (VISTARIL) 25 MG capsule   Yes No   Sig: Take 25-50 mg by mouth 4 times daily as needed for itching   loperamide (IMODIUM) 2 MG capsule   Yes No   Sig: Take 4 mg by mouth 4 times daily as needed for diarrhea   loratadine-pseudoePHEDrine (CLARITIN-D 24-HOUR)  MG 24 hr tablet   Yes No   Sig: Take 1 tablet by mouth daily as needed    magnesium 250 MG tablet   Yes No   Sig: Take 1 tablet by mouth daily   multivitamin w/minerals (THERA-VIT-M) tablet   Yes No   Sig: Take 1 tablet by mouth daily   nitroFURantoin macrocrystal-monohydrate (MACROBID) 100 MG capsule   No No   Sig: Take 1 capsule (100 mg) by mouth 2 times daily   ondansetron (ZOFRAN) 8 MG tablet   No No   Sig: Take 1 tablet (8 mg) by mouth every 8 hours as needed (Nausea/Vomiting)   oxyCODONE (ROXICODONE) 5 MG tablet   No No   Sig: Take 1 tablet (5 mg) by mouth 4 times daily as needed for moderate to severe pain Maximum 4 pills/day.   pantoprazole (PROTONIX) 40 MG EC tablet   No No   Sig: Take 1 tablet (40 mg) by mouth daily   sennosides (SENOKOT) 8.6 MG tablet   No No   Si-2 tablets 1-2 times daily as needed.   simethicone 80 MG TABS   No No   Sig: Take 1 tablet by mouth every 6 hours as needed (bloating, gas, belching)   sulfamethoxazole-trimethoprim (BACTRIM DS) 800-160 MG tablet   No No   Sig: Take 1 tablet by mouth 2 times daily      Facility-Administered Medications: None     Allergies   Allergies   Allergen Reactions     Gluten Meal Palpitations     Augmentin Rash     Patient tolerated Zosyn in 2020     Oxycontin [Oxycodone]      Confusion, loopy, oxycodone is tolerated     Pregabalin      interfered with Cymbalta     Topiramate      Tongue  numbness, taste alteration, irritable      Acetaminophen Nausea     Urine retention       Dust Mite Extract      Gabapentin Dizziness and GI Disturbance     Ketorolac Headache     Latex Rash     Methadone Fatigue     Mold      Naprosyn [Naproxen] Dizziness and GI Disturbance     Seasonal Allergies        Physical Exam   Vital Signs: Temp: 99  F (37.2  C) Temp src: Oral BP: 98/57 Pulse: 99   Resp: 16 SpO2: 95 % O2 Device: None (Room air)    Weight: 181 lbs 0 oz  GENERAL: Alert and awake. Answering questions appropriately. NAD. Pleasant and conversational.   HEENT: AT/NC. Anicteric sclera. Mucous membranes moist. Anisocoria.   CARDIOVASCULAR: RRR. S1, S2. No murmurs, rubs, or gallops.   RESPIRATORY: Effort normal on RA. Clear to auscultation bilaterally, no rales, rhonchi or wheezes, respirations unlabored   GI: Abdomen soft, TTP in RUQ abdomen without rebound or guarding, normoactive bowel sounds present. Old midline abdominal surgical scar.   : + R CVAT  EXTREMITIES: No peripheral edema. No calf asymmetry, erythema, or tenderness.   NEUROLOGICAL: CN II-XII grossly intact. Moving all extremities symmetrically.   SKIN: Intact. Warm and dry.  No jaundice.    Data   Data reviewed today: I reviewed all medications, new labs and imaging results over the last 24 hours.     ROUTINE IP LABS (Last four results)  CMP Recent Labs   Lab 08/23/22  0805 08/22/22  1834   * 137   POTASSIUM 4.3 4.5   CHLORIDE 101 103   CO2 27 28   ANIONGAP 7 6*   * 98   BUN 12.0 11.9   CR 0.93 0.95   PETRA 8.3* 8.4*   MAG 2.1  --    PHOS 4.2  --    PROTTOTAL 5.7* 6.1*   ALBUMIN 3.2* 3.6   BILITOTAL 0.8 0.5   ALKPHOS 107* 103   AST 45* 33   ALT 39* 30     CBC   Recent Labs   Lab 08/23/22  0805 08/22/22  1834   WBC 11.2* 9.2   RBC 3.65* 3.66*   HGB 11.0* 11.1*   HCT 34.4* 35.5   MCV 94 97   MCH 30.1 30.3   MCHC 32.0 31.3*   RDW 15.4* 15.4*    212     INR No lab results found in last 7 days.    Recent Results (from the past 24  hour(s))   CT Chest Pulmonary Embolism w Contrast   CT Abdomen Pelvis w Contrast    Narrative    EXAM: CT ABDOMEN PELVIS W CONTRAST, CT CHEST PULMONARY EMBOLISM W CONTRAST  LOCATION: [facility]  DATE/TIME: 8/23/2022 12:37 AM    INDICATION: right flank and lower back pain, hx of gastric CA and whipple  COMPARISON: CT of the chest, abdomen, and pelvis 05/17/2022.  TECHNIQUE: CT chest pulmonary angiogram and routine CT abdomen pelvis with IV contrast. Arterial phase through the chest and venous phase through the abdomen and pelvis. Multiplanar reformats and MIP reconstructions were performed. Dose reduction   techniques were used.   CONTRAST: iopamidol (ISOVUE 370) solution 111 mL    FINDINGS:  ANGIOGRAM CHEST: No pulmonary embolus. No aortic dissection or aneurysm.    LUNGS AND PLEURA: Mild infectious or inflammatory nodular infiltrate has developed in the dependent left lower lobe. A few tiny pulmonary nodules are unchanged.    MEDIASTINUM/AXILLAE: Right internal jugular venous Port-A-Cath terminates in the lower SVC.    CORONARY ARTERY CALCIFICATION: None.    HEPATOBILIARY: Postcholecystectomy. Biliary enteric anastomosis. Mild pneumobilia.    PANCREAS: Post Whipple procedure.    SPLEEN: Normal.    ADRENAL GLANDS: Normal.    KIDNEYS/BLADDER: Wall thickening of the urinary bladder. Prominent urothelial enhancement of the right ureter and renal pelvis. Mild asymmetric inflammatory stranding of the right kidney. Subtle striated cortical hypoenhancement of the right kidney.   Normal left kidney.    BOWEL: Post Whipple procedure. Colonic diverticulosis. No bowel obstruction. Normal appendix.    LYMPH NODES: Normal.    VASCULATURE: Mild aortoiliac atherosclerosis. No aneurysm.    PELVIC ORGANS: Normal.    MUSCULOSKELETAL: Degenerative changes of the right shoulder and spine. Post left femoral acetabular arthroplasty and instrumented fusion of the left sacroiliac joint.       Impression    IMPRESSION:  1.  Ascending urinary tract infection and pyelonephritis on the right.  2.  Cystitis.  3.  Mild infectious or inflammatory infiltrate of the left lower lobe.  4.  No pulmonary embolus.  5.  Post Whipple procedure.

## 2022-08-23 NOTE — PROGRESS NOTES
[facility]    TRANSFER Progress Note - [facility]       Date of Admission:  8/22/2022    Family Medicine Progress Note - [facility]       Main Plans for Today    - Continue Antibiotics  - Pain control  - Renal U/S    Assessment & Plan        Nathalie Bashir is a 56 year old F with PMHx of gastric adenocarcinoma s/p chemotherapy, s/p Whipple, gastric bypass, and partial gastrectomy (2020), in surveillance, chemotherapy-induced neuropathy, chronic pain on chronic opioids, migraines, depression, GERD, chronic constipation, recurrent MDR UTIs, who presented with R sided chest pain, abdominal pain and flank pain was admitted on 8/23/22 for pyelonephritis.     # Pyelonephritis  # History of recurrent UTIs  # History of multi-drug resistant organisms  # Back pain  Presenting with R flank pain, dysuria, intermittent hematuria, UA grossly + with imaging consistent with pyelo. Afebrile. + leukocytosis.  Not meeting SIRS criteria. Patient with recurrent UTI since 4/2022, s/p treatment with most recent UCx 8/4/22 grew ESBL E coli. Follows with urology outpatient for recurrent UTIs. Has cystoscopy 4/28/22 which was without abnormality. No concern for retention, no concern for stones on prior imaging. Patient reported having mild R flank pain with prior UTIs, so possibly had pyelonephritis previously that was not adequately treated leading to recurrent infections. Initially started on Zosyn in ED, switched to meropenem by [place] admitting provider.  - Meropenam 1 gram IV Q8H and narrow as able once cultures return   - Follow up blood and urine cultures  - Obtain BL renal US - further assess for any stone burden  - Zofran and compazine PRN   - STOP IVF as tolerating PO  - Pain management: IV Dilaudid 0.5 mg Q4H PRN, oxycodone 5-10 mg Q4H PRN.  - Patient may benefit from starting vaginal estrogen, Hiprex and Vit C for prevention and close follow up  with urology     # Chronic pain (hip, neck, neuropathy) Follows with palliative, neurology, and PM&R outpatient.   - Continue PTA regimen amitriptyline 75 mg daily, duloxetine 60 mg daily, flexeril 10 mg TID and atarax PRN  - Outpatient patient takes oxycodone 5 mg QID PRN. Acute opiate regimen as noted above   - tylenol, gabapentin, pregabalin, toradol, naproxen listed as allergy. Avoiding NSAIDs for h/o gastric bypass  - ice/heat    # Transaminitis - mild with alk phos 107, ALT 39, AST 45. Normal T bili. CT abd/pelvis with Postcholecystectomy. Biliary enteric anastomosis. Mild pneumobilia. No acute process.   - Trend LFTs      # Pulmonary infiltrates, left lower lobe  # Pulmonary nodules  Patient noted R sided lower chest pain on admission, so CTPE was obtained, demonstrated only incidental LLL infiltrate. No ssx PNA and lungs CTAB.   - Monitor for development of any respiratory symptoms. Continue incentive spirometry    # Mental health: Depression, anxiety and opioid dependency:  Pt has chronic depression & on Cymbalta 60 mg that works for her symptoms. She is on Hydroxyzine 25-50 mg that works for her anxiety symptoms.      # History of gastric cancer, S/p Whipple Procedure   Incidentally found during Whipple 1/2020 for presumed IPMN, s/p neoadjuvant chemotherapy and curative intent surgery 10/2020. Last saw oncology 7/27/22. In surveillance period with plan for repeat CT CAP Q6 months. See more detailed history in Oncology 7/27/22 and PM&R note 6/22/22.  - Outpatient follow up with oncology as previously scheduled  - Consider further risk/benefit discussion of port, as she has had it 2 years+      # Normocytic Anemia, chronic- Hgb 11.1 on admission. Baseline 11-12. Trend CBC     # Colonic diverticulosis - Incidentally noted on CT abdomen pelvis. Has been seen on previous CTs.      # Migraines with aura - Continue PTA sumatriptan 100 mg PRN      # Chronic constipation - Continue PTA senna and simethicone  #  "Possible hx of Crohn's disease? - Mentioned in history, but not mentioned in GI notes, last colonoscopy in 2018 with diverticulosis but no other abnormality or biopsy for diagnosis.      # GERD - Continue PTA pantoprazole 40 mg daily and famotidine 20 mg BID prn     # Seasonal allergies - PTA flonase daily     Diet: Combination Diet Regular Diet Adult; Other - please comment    DVT Prophylaxis: Low Risk/Ambulatory with no VTE prophylaxis indicated  Fluids: PO  Hernandez Catheter: Not present  Central Lines: PRESENT       Cardiac Monitoring: None  Code Status: Full Code      Disposition Plan      Expected Discharge Date: 08/25/2022      Destination: home;home with family  Discharge Comments: Needing IV antibiotics. May transition to orals in the next few days.        The patient's care was discussed with the Attending Physician, Dr. Goss.    [facility]    Resident/Fellow Attestation   I, Ashley Zapata MD, was present with the medical/GIOVANNY student who participated in the service and in the documentation of the note.  I have verified the history and personally performed the physical exam and medical decision making.  I agree with the assessment and plan of care as documented in the note.        Ashley Zpaata MD  [facility]  [facility]  Securely message with the Vocera Web Console (learn more here)  Text page via [facility] Paging/Directory   Please see signed in provider for up to date coverage information      Clinically Significant Risk Factors Present on Admission             # Hypoalbuminemia: Albumin = 3.2 g/dL (Ref range: 3.5 - 5.2 g/dL) on admission, will monitor as appropriate        # Overweight: Estimated body mass index is 28.35 kg/m  as calculated from the following:    Height as of this encounter: 1.702 m (5' 7\").    Weight as of this encounter: 82.1 kg (181 lb).    "     ______________________________________________________________________    Interval History   No major changes from admission note earlier this afternoon. Pt scales the Rt sided back pain as 8/10, sharp, constant, radiated to her Rt shoulder, improve with pain medication.    Re: port - says she has had conversations with oncologist about this many times - plans to keep it in indefinitely despite infection risk.    Social history: She is retired, live with her  who is supportive. She has one adult child. She denied smoking, drinking and using drugs.       Data reviewed today: I reviewed all medications, new labs and imaging results over the last 24 hours. I personally reviewed the CT ABDOMEN PELVIS W CONTRAST image(s) showing IMPRESSION:.  1.  Ascending urinary tract infection and pyelonephritis on the right.  2.  Cystitis.  3.  Mild infectious or inflammatory infiltrate of the left lower lobe.  4.  No pulmonary embolus.  5.  Post Whipple procedure.      Past Medical History:   Diagnosis Date     Allergic rhinitis      Cancer (H)     stomach cancer     Chronic pain      Depression      Gastric adenocarcinoma (H)      Lumbago      Migraine      Opioid dependence (H)      Other chronic pain     low back     Sacroiliitis (H)     low back pain     Trochanteric bursitis     leg length discrrepancy, hip pain       Past Surgical History:   Procedure Laterality Date     ARTHROPLASTY HIP Left 2016     BACK SURGERY      L4-5 decompression     CATARACT IOL, RT/LT       CHOLECYSTECTOMY N/A 1/28/2020    Procedure: Cholecystectomy;  Surgeon: Yandel Mallory MD;  [facility]     ENDOSCOPIC RETROGRADE CHOLANGIOPANCREATOGRAM N/A 7/27/2020    Procedure: ENDOSCOPIC RETROGRADE CHOLANGIOPANCREATOGRAPHY  **Latex Allergy** with jejunal biopsies;  Surgeon: Jeet Aviles MD;  [facility]     ESOPHAGOSCOPY, GASTROSCOPY, DUODENOSCOPY (EGD), COMBINED N/A 3/3/2020    Procedure: ESOPHAGOGASTRODUODENOSCOPY, WITH  ENDOSCOPIC US (enoxaparin);  Surgeon: Bakari Plata MD;  [facility]     ESOPHAGOSCOPY, GASTROSCOPY, DUODENOSCOPY (EGD), COMBINED N/A 12/7/2020    Procedure: Upper Endoscopy with stent placement;  Surgeon: Jeet Aviles MD;  Location:  OR     ESOPHAGOSCOPY, GASTROSCOPY, DUODENOSCOPY (EGD), COMBINED N/A 2/8/2021    Procedure: ESOPHAGOGASTRODUODENOSCOPY (EGD)AND STENT REMOVAL;  Surgeon: Jeet Aviles MD;  Location:  OR     EYE SURGERY       GASTRECTOMY N/A 10/21/2020    Procedure: Open subtotal gastectomy with David en Y Reconstruction; D1 Lymph Node Disection  **Latex Allergy**;  Surgeon: Yandel Mallory MD;  [facility]     IR CHEST PORT PLACEMENT > 5 YRS OF AGE  3/26/2020     IR LUMBAR EPIDURAL STEROID INJECTION  8/25/2009     IR LUMBAR EPIDURAL STEROID INJECTION  9/2/2009     IR LUMBAR EPIDURAL STEROID INJECTION  9/25/2009     IR LUMBAR EPIDURAL STEROID INJECTION  11/23/2009     IR LUMBAR EPIDURAL STEROID INJECTION  4/1/2010     IR LUMBAR EPIDURAL STEROID INJECTION  9/1/2010     IR LUMBAR EPIDURAL STEROID INJECTION  3/10/2011     IR LUMBAR EPIDURAL STEROID INJECTION  8/30/2011     LAPAROSCOPY DIAGNOSTIC (GENERAL) N/A 10/13/2020    Procedure: Diagnostic laparoscopy with peritoneal washings  **Latex Allergy**;  Surgeon: Yandel Mallory MD;  [facility]     OPEN REDUCTION INTERNAL FIXATION RODDING INTRAMEDULLARY FEMUR Left 12/23/2014    Procedure: OPEN REDUCTION INTERNAL FIXATION RODDING INTRAMEDULLARY FEMUR;  Surgeon: Arnol Santiago MD;  [facility] OR     ORTHOPEDIC SURGERY       PICC DOUBLE LUMEN PLACEMENT Right 02/07/2020    5 fr double lumen 41 cm     KY LAMINEC/FACETECT/FORAMIN,LUMBAR 1 SEG      Description: Laminectomy Decompress, Facetectomy, Foraminotomy Lumbar Seg;  Recorded: 04/12/2012;     REMOVE HARDWARE LOWER EXTREMITY Left 4/7/2015    Procedure: REMOVE HARDWARE LOWER EXTREMITY;  Surgeon: Arnol Santiago MD;  Location:  OR     REMOVE HARDWARE RODDING  INTRAMEDULLARY FEMUR Left 12/17/2015    Procedure: REMOVE HARDWARE RODDING INTRAMEDULLARY FEMUR;  Surgeon: Arnol Santiago MD;  [facility]     RESECT BONE LOWER EXTREMITY Left 12/23/2014    Procedure: RESECT BONE LOWER EXTREMITY;  Surgeon: Arnol Santiago MD;  [facility]     SHORTENING OSTEOPLASTY FEMUR W/ IM NAILING       WHIPPLE PROCEDURE N/A 1/28/2020    Procedure: Open Whipple procedure and Cholecystectomy;  Surgeon: Yandel Mallory MD;  [facility] OR     WHIPPLE PROCEDURE       ZZC FUSION OF SACROILIAC JOINT      Description: Arthrodesis Sacroiliac Joint Left;  Recorded: 10/07/2011;     ZZC REPAIR DETACH RETINA,SCLERAL BUCKLE         Physical Exam   Vital Signs: Temp: 98.3  F (36.8  C) Temp src: Oral BP: 101/63 Pulse: 88   Resp: 16 SpO2: 98 % O2 Device: None (Room air)    Weight: 181 lbs 0 oz     GENERAL: Alert and awake. Answering questions appropriately. NAD. Pleasant and conversational.   HEENT: AT/NC. Anicteric sclera. Mucous membranes moist. Anisocoria.   CARDIOVASCULAR: RRR. S1, S2. No murmurs, rubs, or gallops.   RESPIRATORY: Effort normal on RA. Clear to auscultation bilaterally, no rales, rhonchi or wheezes, respirations unlabored   GI: Big mid-line Abdominal scar, Abdomen soft, non tender, normoactive bowel sounds present. R CVA tenderness, none on L.   : + R CVAT  EXTREMITIES: No peripheral edema. No calf asymmetry, erythema, or tenderness.   NEUROLOGICAL: CN II-XII grossly intact. Moving all extremities symmetrically.   Sensations on hands and feet is intact B/L  SKIN: Intact. Warm and dry.  No jaundice.    Data   Recent Labs   Lab 08/23/22  0805 08/22/22  1834   WBC 11.2* 9.2   HGB 11.0* 11.1*   MCV 94 97    212   * 137   POTASSIUM 4.3 4.5   CHLORIDE 101 103   CO2 27 28   BUN 12.0 11.9   CR 0.93 0.95   ANIONGAP 7 6*   PETRA 8.3* 8.4*   * 98   ALBUMIN 3.2* 3.6   PROTTOTAL 5.7* 6.1*   BILITOTAL 0.8 0.5   ALKPHOS 107* 103   ALT 39* 30   AST 45* 33   LIPASE  --  7*      Recent Results (from the past 24 hour(s))   CT Chest Pulmonary Embolism w Contrast    Narrative    EXAM: CT ABDOMEN PELVIS W CONTRAST, CT CHEST PULMONARY EMBOLISM W CONTRAST  LOCATION: [facility]  DATE/TIME: 8/23/2022 12:37 AM    INDICATION: right flank and lower back pain, hx of gastric CA and whipple  COMPARISON: CT of the chest, abdomen, and pelvis 05/17/2022.  TECHNIQUE: CT chest pulmonary angiogram and routine CT abdomen pelvis with IV contrast. Arterial phase through the chest and venous phase through the abdomen and pelvis. Multiplanar reformats and MIP reconstructions were performed. Dose reduction   techniques were used.   CONTRAST: iopamidol (ISOVUE 370) solution 111 mL    FINDINGS:  ANGIOGRAM CHEST: No pulmonary embolus. No aortic dissection or aneurysm.    LUNGS AND PLEURA: Mild infectious or inflammatory nodular infiltrate has developed in the dependent left lower lobe. A few tiny pulmonary nodules are unchanged.    MEDIASTINUM/AXILLAE: Right internal jugular venous Port-A-Cath terminates in the lower SVC.    CORONARY ARTERY CALCIFICATION: None.    HEPATOBILIARY: Postcholecystectomy. Biliary enteric anastomosis. Mild pneumobilia.    PANCREAS: Post Whipple procedure.    SPLEEN: Normal.    ADRENAL GLANDS: Normal.    KIDNEYS/BLADDER: Wall thickening of the urinary bladder. Prominent urothelial enhancement of the right ureter and renal pelvis. Mild asymmetric inflammatory stranding of the right kidney. Subtle striated cortical hypoenhancement of the right kidney.   Normal left kidney.    BOWEL: Post Whipple procedure. Colonic diverticulosis. No bowel obstruction. Normal appendix.    LYMPH NODES: Normal.    VASCULATURE: Mild aortoiliac atherosclerosis. No aneurysm.    PELVIC ORGANS: Normal.    MUSCULOSKELETAL: Degenerative changes of the right shoulder and spine. Post left femoral acetabular arthroplasty and instrumented fusion of the left sacroiliac joint.       Impression    IMPRESSION:  1.  Ascending urinary tract infection and pyelonephritis on the right.  2.  Cystitis.  3.  Mild infectious or inflammatory infiltrate of the left lower lobe.  4.  No pulmonary embolus.  5.  Post Whipple procedure.   CT Abdomen Pelvis w Contrast    Narrative    EXAM: CT ABDOMEN PELVIS W CONTRAST, CT CHEST PULMONARY EMBOLISM W CONTRAST  LOCATION: [facility]  DATE/TIME: 8/23/2022 12:37 AM    INDICATION: right flank and lower back pain, hx of gastric CA and whipple  COMPARISON: CT of the chest, abdomen, and pelvis 05/17/2022.  TECHNIQUE: CT chest pulmonary angiogram and routine CT abdomen pelvis with IV contrast. Arterial phase through the chest and venous phase through the abdomen and pelvis. Multiplanar reformats and MIP reconstructions were performed. Dose reduction   techniques were used.   CONTRAST: iopamidol (ISOVUE 370) solution 111 mL    FINDINGS:  ANGIOGRAM CHEST: No pulmonary embolus. No aortic dissection or aneurysm.    LUNGS AND PLEURA: Mild infectious or inflammatory nodular infiltrate has developed in the dependent left lower lobe. A few tiny pulmonary nodules are unchanged.    MEDIASTINUM/AXILLAE: Right internal jugular venous Port-A-Cath terminates in the lower SVC.    CORONARY ARTERY CALCIFICATION: None.    HEPATOBILIARY: Postcholecystectomy. Biliary enteric anastomosis. Mild pneumobilia.    PANCREAS: Post Whipple procedure.    SPLEEN: Normal.    ADRENAL GLANDS: Normal.    KIDNEYS/BLADDER: Wall thickening of the urinary bladder. Prominent urothelial enhancement of the right ureter and renal pelvis. Mild asymmetric inflammatory stranding of the right kidney. Subtle striated cortical hypoenhancement of the right kidney.   Normal left kidney.    BOWEL: Post Whipple procedure. Colonic diverticulosis. No bowel obstruction. Normal appendix.    LYMPH NODES: Normal.    VASCULATURE: Mild aortoiliac atherosclerosis. No aneurysm.    PELVIC  ORGANS: Normal.    MUSCULOSKELETAL: Degenerative changes of the right shoulder and spine. Post left femoral acetabular arthroplasty and instrumented fusion of the left sacroiliac joint.      Impression    IMPRESSION:  1.  Ascending urinary tract infection and pyelonephritis on the right.  2.  Cystitis.  3.  Mild infectious or inflammatory infiltrate of the left lower lobe.  4.  No pulmonary embolus.  5.  Post Whipple procedure.     Medications       amitriptyline  75 mg Oral At Bedtime     cyanocobalamin  1,000 mcg Oral Daily     DULoxetine  60 mg Oral Daily     fluticasone  1 spray Both Nostrils Daily     meropenem  1 g Intravenous Q8H     multivitamin w/minerals  1 tablet Oral Daily     pantoprazole  40 mg Oral Daily     sodium chloride (PF)  3 mL Intracatheter Q8H

## 2022-08-23 NOTE — PLAN OF CARE
"Goal Outcome Evaluation:  VS: /63 (BP Location: Right arm)   Pulse 88   Temp 98.3  F (36.8  C) (Oral)   Resp 16   Ht 1.702 m (5' 7\")   Wt 82.1 kg (181 lb)   LMP 09/23/2014   SpO2 98%   BMI 28.35 kg/m       O2: SpO2 > 90% and stable on RA. LS clear and equal bilaterally. Denies chest pain but pt has SOB with activity.    Output: Voids spontaneously without difficulty.    Last BM: LBM 8/23   Activity: Independent in room   Skin: Intact.    Pain: Right flank pain managed with prn oxycodone and IV dilaudid for breakthrough pain.    CMS: Intact, AOx4. Pt has chemo induced neuropathy in BUE's and BLE's.    Dressing: None.    Diet: Regular diet. Denies nausea/vomiting.    LDA: Port hep locked between abx.    Equipment: IV pole, personal belongings,    Plan: Continue with plan of care. Call light in pt's reach.    Additional Info: Pt is on contact precaution for ESBL.                           "

## 2022-08-23 NOTE — ED PROVIDER NOTES
[place] EMERGENCY DEPARTMENT ([facility])  8/22/22   S      History     Chief Complaint   Patient presents with     Rib Pain     Shortness of Breath     The history is provided by the patient and medical records.     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  She states that with some regularity she has pain at a pinpoint spot in her right upper quadrant under the ribs but today developed pain in the right flank that radiates down to her right lower back and up into her right upper thoracic back.  It causes her pain when she takes deep breaths but she does not have cough or fever. She has been dealing with UTIs over the course of the summer and so wondered if it might be signs of a kidney infection. She does not today though have any dysuria, urgency, frequency of urine output, and no change to bowel habits.  She is not anticoagulated and does not have a past history of PE or DVT.  She does have chronic pain and takes oxycodone 4 times daily and has used that without a lot of improvement.  She is not currently on any antibiotics for UTI.      This part of the document was transcribed by Marisol Simon, Medical Scribe.      Past Medical History  Past Medical History:   Diagnosis Date     Allergic rhinitis      Cancer (H)     stomach cancer     Chronic pain      Depression      Gastric adenocarcinoma (H)      Lumbago      Migraine      Opioid dependence (H)      Other chronic pain     low back     Sacroiliitis (H)     low back pain     Trochanteric bursitis     leg length discrrepancy, hip pain     Past Surgical  History:   Procedure Laterality Date     ARTHROPLASTY HIP Left 2016     BACK SURGERY      L4-5 decompression     CATARACT IOL, RT/LT       CHOLECYSTECTOMY N/A 1/28/2020    Procedure: Cholecystectomy;  Surgeon: Yandel Mallory MD;  Location: [facility] OR     ENDOSCOPIC RETROGRADE CHOLANGIOPANCREATOGRAM N/A 7/27/2020    Procedure: ENDOSCOPIC RETROGRADE CHOLANGIOPANCREATOGRAPHY  **Latex Allergy** with jejunal biopsies;  Surgeon: Jeet Aviles MD;  Location: [facility] OR     ESOPHAGOSCOPY, GASTROSCOPY, DUODENOSCOPY (EGD), COMBINED N/A 3/3/2020    Procedure: ESOPHAGOGASTRODUODENOSCOPY, WITH ENDOSCOPIC US (enoxaparin);  Surgeon: Bakari Plata MD;  [facility]     ESOPHAGOSCOPY, GASTROSCOPY, DUODENOSCOPY (EGD), COMBINED N/A 12/7/2020    Procedure: Upper Endoscopy with stent placement;  Surgeon: Jeet Aviles MD;  Location:  OR     ESOPHAGOSCOPY, GASTROSCOPY, DUODENOSCOPY (EGD), COMBINED N/A 2/8/2021    Procedure: ESOPHAGOGASTRODUODENOSCOPY (EGD)AND STENT REMOVAL;  Surgeon: Jeet Aviles MD;  Location:  OR     EYE SURGERY       GASTRECTOMY N/A 10/21/2020    Procedure: Open subtotal gastectomy with David en Y Reconstruction; D1 Lymph Node Disection  **Latex Allergy**;  Surgeon: Yandel Mallory MD;  Location: [facility] OR     IR CHEST PORT PLACEMENT > 5 YRS OF AGE  3/26/2020     IR LUMBAR EPIDURAL STEROID INJECTION  8/25/2009     IR LUMBAR EPIDURAL STEROID INJECTION  9/2/2009     IR LUMBAR EPIDURAL STEROID INJECTION  9/25/2009     IR LUMBAR EPIDURAL STEROID INJECTION  11/23/2009     IR LUMBAR EPIDURAL STEROID INJECTION  4/1/2010     IR LUMBAR EPIDURAL STEROID INJECTION  9/1/2010     IR LUMBAR EPIDURAL STEROID INJECTION  3/10/2011     IR LUMBAR EPIDURAL STEROID INJECTION  8/30/2011     LAPAROSCOPY DIAGNOSTIC (GENERAL) N/A 10/13/2020    Procedure: Diagnostic laparoscopy with peritoneal washings  **Latex Allergy**;  Surgeon: Yandel Mallory MD;  [facility]     OPEN REDUCTION INTERNAL FIXATION RODDING  INTRAMEDULLARY FEMUR Left 12/23/2014    Procedure: OPEN REDUCTION INTERNAL FIXATION RODDING INTRAMEDULLARY FEMUR;  Surgeon: Arnol Santiago MD;  Location: [facility] OR     ORTHOPEDIC SURGERY       PICC DOUBLE LUMEN PLACEMENT Right 02/07/2020    5 fr double lumen 41 cm     LA LAMINEC/FACETECT/FORAMIN,LUMBAR 1 SEG      Description: Laminectomy Decompress, Facetectomy, Foraminotomy Lumbar Seg;  Recorded: 04/12/2012;     REMOVE HARDWARE LOWER EXTREMITY Left 4/7/2015    Procedure: REMOVE HARDWARE LOWER EXTREMITY;  Surgeon: Arnol Santiago MD;  [facility]: [facility] OR     REMOVE HARDWARE RODDING INTRAMEDULLARY FEMUR Left 12/17/2015    Procedure: REMOVE HARDWARE RODDING INTRAMEDULLARY FEMUR;  Surgeon: Arnol Santiago MD;  [facility]     RESECT BONE LOWER EXTREMITY Left 12/23/2014    Procedure: RESECT BONE LOWER EXTREMITY;  Surgeon: Arnol Santiago MD;  [facility]     SHORTENING OSTEOPLASTY FEMUR W/ IM NAILING       WHIPPLE PROCEDURE N/A 1/28/2020    Procedure: Open Whipple procedure and Cholecystectomy;  Surgeon: Yandel Mallory MD;  Location: [facility] OR     WHIPPLE PROCEDURE       ZZC FUSION OF SACROILIAC JOINT      Description: Arthrodesis Sacroiliac Joint Left;  Recorded: 10/07/2011;     ZZC REPAIR DETACH RETINA,SCLERAL BUCKLE       amitriptyline (ELAVIL) 75 MG tablet  calcium carbonate 500 mg, elemental, (OSCAL 500) 1250 (500 Ca) MG TABS tablet  Cyanocobalamin 1000 MCG CAPS  cyclobenzaprine (FLEXERIL) 10 MG tablet  DULoxetine (CYMBALTA) 30 MG capsule  famotidine (PEPCID) 20 MG tablet  fluticasone (FLONASE) 50 MCG/ACT nasal spray  hydrOXYzine (VISTARIL) 25 MG capsule  loperamide (IMODIUM) 2 MG capsule  loratadine-pseudoePHEDrine (CLARITIN-D 24-HOUR)  MG 24 hr tablet  magnesium 250 MG tablet  multivitamin w/minerals (THERA-VIT-M) tablet  nitroFURantoin macrocrystal-monohydrate (MACROBID) 100 MG capsule  ondansetron (ZOFRAN) 8 MG tablet  oxyCODONE (ROXICODONE) 5 MG tablet  pantoprazole (PROTONIX) 40 MG EC  tablet  sennosides (SENOKOT) 8.6 MG tablet  simethicone 80 MG TABS  sulfamethoxazole-trimethoprim (BACTRIM DS) 800-160 MG tablet  SUMAtriptan (IMITREX) 100 MG tablet      Allergies   Allergen Reactions     Gluten Meal Palpitations     Augmentin Rash     Patient tolerated Zosyn in 2020     Oxycontin [Oxycodone]      Confusion, loopy, oxycodone is tolerated     Pregabalin      interfered with Cymbalta     Topiramate      Tongue numbness, taste alteration, irritable      Acetaminophen Nausea     Urine retention       [place] Extract      Gabapentin Dizziness and GI Disturbance     Ketorolac Headache     Latex Rash     Methadone Fatigue     Mold      Naprosyn [Naproxen] Dizziness and GI Disturbance     Seasonal Allergies      Family History  Family History   Problem Relation Age of Onset     Heart Failure Mother 77     Dementia Father         frontal lobe     No Known Problems Sister      No Known Problems Brother      Anesthesia Reaction No family hx of      Deep Vein Thrombosis No family hx of      Social History   Social History     Tobacco Use     Smoking status: Former Smoker     Packs/day: 0.50     Years: 25.00     Pack years: 12.50     Types: Cigarettes     Quit date: 2020     Years since quittin.6     Smokeless tobacco: Never Used   Substance Use Topics     Alcohol use: No     Drug use: No      Past medical history, past surgical history, medications, allergies, family history, and social history were reviewed with the patient. No additional pertinent items.       Review of Systems   Constitutional: Negative for fever.   Respiratory: Negative for cough.    Gastrointestinal: Negative for constipation and diarrhea.   Genitourinary: Positive for flank pain. Negative for dysuria, frequency, hematuria and urgency.   Musculoskeletal: Positive for back pain.   Hematological: Does not bruise/bleed easily.     A complete review of systems was performed with pertinent positives and negatives noted in the HPI,  "and all other systems negative.    Physical Exam   BP: 100/53  Pulse: 98  Temp: 99.5  F (37.5  C)  Resp: 16  Height: 170.2 cm (5' 7\")  Weight: 82.1 kg (181 lb)  SpO2: 98 %     Physical Exam  Gen:A&Ox3, no acute distress  HEENT:PERRL, no facial tenderness or wounds, head atraumatic, oropharynx clear, mucous membranes moist, TMs clear bilaterally  Neck:no bony tenderness or step offs, no JVD, trachea midline  Back: right CVA discomfort that does not worsen much with palpation, and tenderness or right thoracic and lumbar perispinal muscle tenderness   CV:RRR without murmurs  PULM:Clear to auscultation bilaterally  Abd:soft, non-distended, right upper quadrant pinpoint tenderness  UE:No traumatic injuries, skin normal  LE:no traumatic injuries, skin normal, no LE edema or calf tenderness  Neuro:CN II-XII intact, strength 5/5 throughout, gait stable with cane  Skin: no rashes or ecchymoses  ED Course      Procedures            EKG Interpretation:      Interpreted by Julienne Escobedo MD  Time reviewed: 8:08 PM  Symptoms at time of EKG: chest and right flank pain  Rhythm: normal sinus   Rate: 89  Axis: normal  Ectopy: PACs  Conduction: normal  ST Segments/ T Waves: No ST-T wave changes  Q Waves: none  Comparison to prior: Unchanged from Aug. 21, 2020 other than new PACs    Clinical Impression: NSR with PACs       Results for orders placed or performed during the hospital encounter of 08/22/22   CT Chest Pulmonary Embolism w Contrast     Status: None    Narrative    EXAM: CT ABDOMEN PELVIS W CONTRAST, CT CHEST PULMONARY EMBOLISM W CONTRAST  LOCATION: [facility]  DATE/TIME: 8/23/2022 12:37 AM    INDICATION: right flank and lower back pain, hx of gastric CA and whipple  COMPARISON: CT of the chest, abdomen, and pelvis 05/17/2022.  TECHNIQUE: CT chest pulmonary angiogram and routine CT abdomen pelvis with IV contrast. Arterial phase through the chest and venous phase through the " abdomen and pelvis. Multiplanar reformats and MIP reconstructions were performed. Dose reduction   techniques were used.   CONTRAST: iopamidol (ISOVUE 370) solution 111 mL    FINDINGS:  ANGIOGRAM CHEST: No pulmonary embolus. No aortic dissection or aneurysm.    LUNGS AND PLEURA: Mild infectious or inflammatory nodular infiltrate has developed in the dependent left lower lobe. A few tiny pulmonary nodules are unchanged.    MEDIASTINUM/AXILLAE: Right internal jugular venous Port-A-Cath terminates in the lower SVC.    CORONARY ARTERY CALCIFICATION: None.    HEPATOBILIARY: Postcholecystectomy. Biliary enteric anastomosis. Mild pneumobilia.    PANCREAS: Post Whipple procedure.    SPLEEN: Normal.    ADRENAL GLANDS: Normal.    KIDNEYS/BLADDER: Wall thickening of the urinary bladder. Prominent urothelial enhancement of the right ureter and renal pelvis. Mild asymmetric inflammatory stranding of the right kidney. Subtle striated cortical hypoenhancement of the right kidney.   Normal left kidney.    BOWEL: Post Whipple procedure. Colonic diverticulosis. No bowel obstruction. Normal appendix.    LYMPH NODES: Normal.    VASCULATURE: Mild aortoiliac atherosclerosis. No aneurysm.    PELVIC ORGANS: Normal.    MUSCULOSKELETAL: Degenerative changes of the right shoulder and spine. Post left femoral acetabular arthroplasty and instrumented fusion of the left sacroiliac joint.      Impression    IMPRESSION:  1.  Ascending urinary tract infection and pyelonephritis on the right.  2.  Cystitis.  3.  Mild infectious or inflammatory infiltrate of the left lower lobe.  4.  No pulmonary embolus.  5.  Post Whipple procedure.   CT Abdomen Pelvis w Contrast     Status: None    Narrative    EXAM: CT ABDOMEN PELVIS W CONTRAST, CT CHEST PULMONARY EMBOLISM W CONTRAST  LOCATION: [facility]  DATE/TIME: 8/23/2022 12:37 AM    INDICATION: right flank and lower back pain, hx of gastric CA and  whipple  COMPARISON: CT of the chest, abdomen, and pelvis 05/17/2022.  TECHNIQUE: CT chest pulmonary angiogram and routine CT abdomen pelvis with IV contrast. Arterial phase through the chest and venous phase through the abdomen and pelvis. Multiplanar reformats and MIP reconstructions were performed. Dose reduction   techniques were used.   CONTRAST: iopamidol (ISOVUE 370) solution 111 mL    FINDINGS:  ANGIOGRAM CHEST: No pulmonary embolus. No aortic dissection or aneurysm.    LUNGS AND PLEURA: Mild infectious or inflammatory nodular infiltrate has developed in the dependent left lower lobe. A few tiny pulmonary nodules are unchanged.    MEDIASTINUM/AXILLAE: Right internal jugular venous Port-A-Cath terminates in the lower SVC.    CORONARY ARTERY CALCIFICATION: None.    HEPATOBILIARY: Postcholecystectomy. Biliary enteric anastomosis. Mild pneumobilia.    PANCREAS: Post Whipple procedure.    SPLEEN: Normal.    ADRENAL GLANDS: Normal.    KIDNEYS/BLADDER: Wall thickening of the urinary bladder. Prominent urothelial enhancement of the right ureter and renal pelvis. Mild asymmetric inflammatory stranding of the right kidney. Subtle striated cortical hypoenhancement of the right kidney.   Normal left kidney.    BOWEL: Post Whipple procedure. Colonic diverticulosis. No bowel obstruction. Normal appendix.    LYMPH NODES: Normal.    VASCULATURE: Mild aortoiliac atherosclerosis. No aneurysm.    PELVIC ORGANS: Normal.    MUSCULOSKELETAL: Degenerative changes of the right shoulder and spine. Post left femoral acetabular arthroplasty and instrumented fusion of the left sacroiliac joint.      Impression    IMPRESSION:  1.  Ascending urinary tract infection and pyelonephritis on the right.  2.  Cystitis.  3.  Mild infectious or inflammatory infiltrate of the left lower lobe.  4.  No pulmonary embolus.  5.  Post Whipple procedure.   [place] Draw     Status: None    Narrative    The following orders were created for panel order  [place] Draw.  Procedure                               Abnormality         Status                     ---------                               -----------         ------                     Extra Blue Top Tube[[id number]]                              Final result               Extra Red Top Tube[[id number]]                               Final result               Extra Green Top (Lithium...[[id number]]                      Final result               Extra Purple Top Tube[[id number]]                            Final result                 Please view results for these tests on the individual orders.   Extra Blue Top Tube     Status: None   Result Value Ref Range    Hold Specimen JIC    Extra Red Top Tube     Status: None   Result Value Ref Range    Hold Specimen JIC    Extra Green Top (Lithium Heparin) Tube     Status: None   Result Value Ref Range    Hold Specimen JIC    Extra Purple Top Tube     Status: None   Result Value Ref Range    Hold Specimen JIC    Comprehensive metabolic panel     Status: Abnormal   Result Value Ref Range    Sodium 137 136 - 145 mmol/L    Potassium 4.5 3.4 - 5.3 mmol/L    Creatinine 0.95 0.51 - 0.95 mg/dL    Urea Nitrogen 11.9 6.0 - 20.0 mg/dL    Chloride 103 98 - 107 mmol/L    Carbon Dioxide (CO2) 28 22 - 29 mmol/L    Anion Gap 6 (L) 7 - 15 mmol/L    Glucose 98 70 - 99 mg/dL    Calcium 8.4 (L) 8.6 - 10.0 mg/dL    Protein Total 6.1 (L) 6.4 - 8.3 g/dL    Albumin 3.6 3.5 - 5.2 g/dL    Bilirubin Total 0.5 <=1.2 mg/dL    Alkaline Phosphatase 103 35 - 104 U/L    AST 33 10 - 35 U/L    ALT 30 10 - 35 U/L    GFR Estimate 70 >60 mL/min/1.73m2   D dimer quantitative     Status: Normal   Result Value Ref Range    D-Dimer Quantitative 0.50 0.00 - 0.50 ug/mL FEU    Narrative    This D-dimer assay is intended for use in conjunction with a clinical pretest probability assessment model to exclude pulmonary embolism (PE) and deep venous thrombosis (DVT) in outpatients suspected of PE or DVT. The cut-off value  is 0.50 ug/mL FEU.   UA with Microscopic reflex to Culture     Status: Abnormal    Specimen: Urine, Midstream   Result Value Ref Range    Color Urine Light Yellow Colorless, Straw, Light Yellow, Yellow    Appearance Urine Slightly Cloudy (A) Clear    Glucose Urine Negative Negative mg/dL    Bilirubin Urine Negative Negative    Ketones Urine Negative Negative mg/dL    Specific Gravity Urine 1.013 1.003 - 1.035    Blood Urine Small (A) Negative    pH Urine 6.0 5.0 - 7.0    Protein Albumin Urine 10  (A) Negative mg/dL    Urobilinogen Urine Normal Normal, 2.0 mg/dL    Nitrite Urine Negative Negative    Leukocyte Esterase Urine Large (A) Negative    Bacteria Urine Many (A) None Seen /HPF    Budding Yeast Urine Few (A) None Seen /HPF    Mucus Urine Present (A) None Seen /LPF    RBC Urine 12 (H) <=2 /HPF    WBC Urine >182 (H) <=5 /HPF    Squamous Epithelials Urine <1 <=1 /HPF    Narrative    Urine Culture ordered based on laboratory criteria   CBC with platelets and differential     Status: Abnormal   Result Value Ref Range    WBC Count 9.2 4.0 - 11.0 10e3/uL    RBC Count 3.66 (L) 3.80 - 5.20 10e6/uL    Hemoglobin 11.1 (L) 11.7 - 15.7 g/dL    Hematocrit 35.5 35.0 - 47.0 %    MCV 97 78 - 100 fL    MCH 30.3 26.5 - 33.0 pg    MCHC 31.3 (L) 31.5 - 36.5 g/dL    RDW 15.4 (H) 10.0 - 15.0 %    Platelet Count 212 150 - 450 10e3/uL    % Neutrophils 78 %    % Lymphocytes 16 %    % Monocytes 4 %    % Eosinophils 2 %    % Basophils 0 %    % Immature Granulocytes 0 %    NRBCs per 100 WBC 0 <1 /100    Absolute Neutrophils 7.2 1.6 - 8.3 10e3/uL    Absolute Lymphocytes 1.4 0.8 - 5.3 10e3/uL    Absolute Monocytes 0.4 0.0 - 1.3 10e3/uL    Absolute Eosinophils 0.2 0.0 - 0.7 10e3/uL    Absolute Basophils 0.0 0.0 - 0.2 10e3/uL    Absolute Immature Granulocytes 0.0 <=0.4 10e3/uL    Absolute NRBCs 0.0 10e3/uL   Lipase     Status: Abnormal   Result Value Ref Range    Lipase 7 (L) 13 - 60 U/L   EKG 12-lead, tracing only     Status: None  (Preliminary result)   Result Value Ref Range    Systolic Blood Pressure  mmHg    Diastolic Blood Pressure  mmHg    Ventricular Rate 89 BPM    Atrial Rate 89 BPM    AL Interval 118 ms    QRS Duration 74 ms     ms    QTc 442 ms    P Axis -14 degrees    R AXIS 52 degrees    T Axis 31 degrees    Interpretation ECG       Sinus rhythm with Premature atrial complexes  Otherwise normal ECG     CBC with platelets differential     Status: Abnormal    Narrative    The following orders were created for panel order CBC with platelets differential.  Procedure                               Abnormality         Status                     ---------                               -----------         ------                     CBC with platelets and d...[[id number]]  Abnormal            Final result                 Please view results for these tests on the individual orders.     Medications   HYDROmorphone (PF) (DILAUDID) injection 0.5 mg (0.5 mg Intravenous Given 8/23/22 0203)   pharmacy alert - intermittent dosing (has no administration in time range)   oxyCODONE (ROXICODONE) tablet 5 mg (5 mg Oral Given 8/22/22 2006)   iopamidol (ISOVUE-370) solution 111 mL (111 mLs Intravenous Given 8/23/22 0003)   sodium chloride (PF) 0.9% PF flush 90 mL (90 mLs Intravenous Given 8/23/22 0012)   piperacillin-tazobactam (ZOSYN) 3.375 g vial to attach to  mL bag (3.375 g Intravenous New Bag 8/23/22 0203)   prochlorperazine (COMPAZINE) injection 5 mg (5 mg Intravenous Given 8/23/22 0203)        Assessments & Plan (with Medical Decision Making)     Nathalie Bashir is a 56-year-old female presenting with right-sided flank, back, and abdominal discomfort.  Differential diagnosis includes musculoskeletal pain, PE, pleural effusion, pneumothorax, or pulmonary infiltrate, recurrent mass, nephrolithiasis or pyelonephritis.      She has IV access via a port which was accessed.  Laboratory testing included CBC, CMP and a D-dimer.    Treated with  5 mg of oral oxycodone   Imaging included a CT chest pulmonary embolism and a CT abdomen pelvis with contrast. CT chest is negative for PE. CT abdomen showing signs of right pyelonephritis and cystitis.   She has a hx of recurrent UTIs, and last 2 cultures grew ESBL E. Coli and klebsiella both sensitive to Zosyn.   EKG was performed that shows normal sinus rhythm with PACs and a rate of 89, no ischemic abnormalities.  Discussed with IM triage hospitalist and admitted for IV antibiotics for pyelonephritis with concern for ESBL organism.     This part of the document was transcribed by Marisol Simon, Medical Scribe.        I have reviewed the nursing notes. I have reviewed the findings, diagnosis, plan and need for follow up with the patient.    New Prescriptions    No medications on file       Final diagnoses:   Pyelonephritis       --  Julienne Escobedo MD   [facility] EMERGENCY DEPARTMENT  8/22/2022     Julienne Escobedo MD  08/23/22 0326

## 2022-08-23 NOTE — PHARMACY-ADMISSION MEDICATION HISTORY
Admission Medication History Completed by Pharmacy    See [facility] Admission Navigator for allergy information, preferred outpatient pharmacy, prior to admission medications and immunization status.     Medication History Sources:     Patient interview    Surescripts dispense report     Changes made to PTA medication list (reason):    Added: None    Deleted:   o Calcium carbonate  o Loperamide  o Magnesium supplement  o Multivitamin  o Nitrofurantoin  o Bactrim    Changed:   o Duloxetine 90mg daily -> 60mg daily   o Famotidine 20mg BID prn -> 20mg BID AC  o Pantoprazole 40mg daily -> 40mg BID     Additional Information:    None    Prior to Admission medications    Medication Sig Last Dose Taking? Auth Provider Long Term End Date   Cyanocobalamin 1000 MCG CAPS Take 1 tablet by mouth daily 8/22/2022 at Unknown time Yes Reported, Patient     cyclobenzaprine (FLEXERIL) 10 MG tablet Take 1 tablet (10 mg) by mouth 3 times daily as needed for muscle spasms 8/22/2022 at Unknown time Yes Karla Millard DO No    DULoxetine (CYMBALTA) 30 MG capsule Take 3 capsules (90 mg) by mouth daily  Patient taking differently: Take 60 mg by mouth daily 8/22/2022 at Unknown time Yes Karla Millard DO Yes    fluticasone (FLONASE) 50 MCG/ACT nasal spray Spray 1 spray into both nostrils daily 8/22/2022 at Unknown time Yes Reported, Patient     hydrOXYzine (VISTARIL) 25 MG capsule Take 25-50 mg by mouth 4 times daily as needed for itching or anxiety 8/22/2022 at Unknown time Yes Reported, Patient     pantoprazole (PROTONIX) 40 MG EC tablet Take 1 tablet (40 mg) by mouth daily  Patient taking differently: Take 40 mg by mouth 2 times daily 8/22/2022 at Unknown time Yes Magaly Pollard MD     sennosides (SENOKOT) 8.6 MG tablet 1-2 tablets 1-2 times daily as needed. Past Week at Unknown time Yes Karla Millard DO     simethicone 80 MG TABS Take 1 tablet by mouth every 6 hours as needed (bloating, gas, belching) Past  Month at Unknown time Yes Precious Scott MD     SUMAtriptan (IMITREX) 100 MG tablet Take 100 mg by mouth at onset of headache for migraine Past Week at Unknown time Yes Unknown, Entered By History No    amitriptyline (ELAVIL) 75 MG tablet Take 1 tablet (75 mg) by mouth At Bedtime 8/21/2022  Karla Millard DO Yes    famotidine (PEPCID) 20 MG tablet TAKE 1 TABLET (20 MG) BY MOUTH 2 TIMES DAILY AS NEEDED  Patient taking differently: Take 20 mg by mouth 2 times daily   Ambika Winston, APRN CNP No    loperamide (IMODIUM) 2 MG capsule Take 4 mg by mouth 4 times daily as needed for diarrhea   Reported, Patient     loratadine-pseudoePHEDrine (CLARITIN-D 24-HOUR)  MG 24 hr tablet Take 1 tablet by mouth daily as needed for allergies Usually takes in Spring/Fall   Reported, Patient     ondansetron (ZOFRAN) 8 MG tablet Take 1 tablet (8 mg) by mouth every 8 hours as needed (Nausea/Vomiting)   Karla Millard DO     oxyCODONE (ROXICODONE) 5 MG tablet Take 1 tablet (5 mg) by mouth 4 times daily as needed for moderate to severe pain Maximum 4 pills/day.   Karla Millard DO No        Date completed: 08/23/22    Medication history completed by: Salvatore Montelongo [facility]

## 2022-08-23 NOTE — CONSULTS
Care Management Initial Consult    General Information  Assessment completed with: Patient,    Type of CM/SW Visit: Initial Assessment    Primary Care Provider verified and updated as needed: Yes   Readmission within the last 30 days: no previous admission in last 30 days      Reason for Consult: discharge planning  Advance Care Planning: Advance Care Planning Reviewed: no concerns identified, other (see comments) (In the process of completing HCD)          Communication Assessment  Patient's communication style: spoken language (English or Bilingual)             Cognitive  Cognitive/Neuro/Behavioral: WDL                      Living Environment:   People in home: spouse     Current living Arrangements: house      Able to return to prior arrangements: yes       Family/Social Support:  Care provided by: self  Provides care for: no one  Marital Status:   Children,           Description of Support System: Supportive, Involved    Support Assessment: Adequate family and caregiver support    Current Resources:   Patient receiving home care services: No     Community Resources: None  Equipment currently used at home: cane, straight  Supplies currently used at home: None    Employment/Financial:  Employment Status:          Financial Concerns: No concerns identified           Lifestyle & Psychosocial Needs:  Social Determinants of Health     Tobacco Use: Medium Risk     Smoking Tobacco Use: Former Smoker     Smokeless Tobacco Use: Never Used   Alcohol Use: Not on file   Financial Resource Strain: Not on file   Food Insecurity: Not on file   Transportation Needs: Not on file   Physical Activity: Not on file   Stress: Not on file   Social Connections: Not on file   Intimate Partner Violence: Not on file   Depression: Not at risk     PHQ-2 Score: 0   Housing Stability: Not on file       Functional Status:  Prior to admission patient needed assistance:   Dependent ADLs:: Independent  Dependent IADLs::  Independent  Assesssment of Functional Status: At functional baseline    Mental Health Status:  Mental Health Status: No Current Concerns       Chemical Dependency Status:  Chemical Dependency Status: No Current Concerns             Values/Beliefs:  Spiritual, Cultural Beliefs, Jehovah's witness Practices, Values that affect care: no               Additional Information:  Per chart Review:    Nathalie Bashir is a 56 year old F with PMHx of gastric adenocarcinoma s/p chemotherapy, s/p Whipple, gastric bypass, and partial gastrectomy (2020), in surveillance, chemotherapy-induced neuropathy, chronic pain on chronic opioids, migraines, depression, GERD, chronic constipation, recurrent MDR UTIs, who presented with R sided chest pain, abdominal pain and flank pain admitted on 8/23/22 for pyelonephritis. Has History of multi-drug resistant organisms. UA grossly + with imaging consistent with pyelo, leukocytosis. Patient with recurrent UTI since 4/2022, s/p treatment with most recent UCx 8/4/22 grew ESBL E coli. Follows with urology outpatient for recurrent UTIs. Has cystoscopy 4/28/22 which was without abnormality. Patient reported having mild R flank pain with prior UTIs, so possibly had pyelonephritis previously  that was not adequately treated leading to recurrent infections. Plan for Blood cultures, follow up blood cultures. Meropenam 1 gram IV Q8H and narrow as able once cultures return. Continue IVF and IV pain management. Patient may benefit from starting Hiprex and Vit C for prevention and close follow up with urology.      This RNCC spoke with patient introduced role of RNCC and discussed role of discharge planner.  Patient states she lives with her spouse and is independant with all aspects of her care. Denied need for home care services. States spouse will provide transport home when discharge imminent. Provided information on how patient can contact SW/RNCC if she has any questions.    RNCC will assist with any DC  needs and transition to lower level of care.    Nadeen LAWSONN RN CCM  RN Care Coordinator 8A and 10 ICU  [address] [address]  [email or web address]   Office: (10 ICU) [phone]   Pager: [phone]    For Weekend & Holiday on call RN Care Coordinator:  (Tasks: Home care, home infusion, medical equipment/oxygen, transportation, IMM & OWEN forms, etc.)   [place] [facility] [place]0800-1630) Saturday & Sunday; (0800-1630)  Recognized Holidays  Pager #1: [phone] Units: 4A, 4C, 4E, 5A & 5B   Pager #2: [phone] Units: 6A, 6B, 6C, 6D  Pager #3: [phone] Units: 7A, 7B, 7C, 7D & 5C   Pager #4: [phone] Units: 5 Ortho, 8A, 10 ICU, & [facility]      For Weekend & Holiday on call Social Work:  (Tasks: TCU, transportation, Hospice, adjustment to illness counseling, Health Care Directives, Child Protection and Domestic Violence concerns, Vulnerable Adult, IMM forms, etc.)   [place] (0800 - 1630) Saturday and Sunday  Pager: [phone] Units: 4A, 4C, 4E, 5A and 5B   Pager: [phone] Units: 6A, 6B, 6C, 6D   Pager: [phone]: 7A, 7B, 7C, 7D, and 5C      [facility] [place]0800-1630) Saturday and Sunday  Units: 5 Ortho, 8A, and 10 ICU   Pager: [phone]

## 2022-08-23 NOTE — PLAN OF CARE
Arrived to the unit around 130pm  LR running  Pain 8/10 right side radiating to back   A&Ox4  10 mg oxy given   Up IND in rm on RA

## 2022-08-24 ENCOUNTER — APPOINTMENT (OUTPATIENT)
Dept: ULTRASOUND IMAGING | Facility: CLINIC | Age: 57
DRG: 872 | End: 2022-08-24
Payer: MEDICARE

## 2022-08-24 ENCOUNTER — HOME INFUSION (PRE-WILLOW HOME INFUSION) (OUTPATIENT)
Dept: PHARMACY | Facility: CLINIC | Age: 57
End: 2022-08-24

## 2022-08-24 ENCOUNTER — TELEPHONE (OUTPATIENT)
Dept: UROLOGY | Facility: CLINIC | Age: 57
End: 2022-08-24

## 2022-08-24 PROBLEM — Z16.12 MICROBIAL RESISTANCE TO EXTENDED SPECTRUM BETA LACTAMASE (ESBL): Status: ACTIVE | Noted: 2022-08-24

## 2022-08-24 LAB
ACINETOBACTER SPECIES: NOT DETECTED
ALBUMIN SERPL-MCNC: 2.5 G/DL (ref 3.4–5)
ALP SERPL-CCNC: 103 U/L (ref 40–150)
ALT SERPL W P-5'-P-CCNC: 35 U/L (ref 0–50)
ANION GAP SERPL CALCULATED.3IONS-SCNC: 1 MMOL/L (ref 3–14)
AST SERPL W P-5'-P-CCNC: 20 U/L (ref 0–45)
BACTERIA UR CULT: ABNORMAL
BILIRUB DIRECT SERPL-MCNC: 0.2 MG/DL (ref 0–0.2)
BILIRUB SERPL-MCNC: 0.5 MG/DL (ref 0.2–1.3)
BUN SERPL-MCNC: 8 MG/DL (ref 7–30)
CALCIUM SERPL-MCNC: 8.3 MG/DL (ref 8.5–10.1)
CHLORIDE BLD-SCNC: 112 MMOL/L (ref 94–109)
CITROBACTER SPECIES: NOT DETECTED
CO2 SERPL-SCNC: 30 MMOL/L (ref 20–32)
CREAT SERPL-MCNC: 0.91 MG/DL (ref 0.52–1.04)
CTX-M: NOT DETECTED
ENTEROBACTER SPECIES: NOT DETECTED
ERYTHROCYTE [DISTWIDTH] IN BLOOD BY AUTOMATED COUNT: 15.6 % (ref 10–15)
ESCHERICHIA COLI: DETECTED
GFR SERPL CREATININE-BSD FRML MDRD: 74 ML/MIN/1.73M2
GLUCOSE BLD-MCNC: 91 MG/DL (ref 70–99)
HCT VFR BLD AUTO: 33.5 % (ref 35–47)
HGB BLD-MCNC: 10.8 G/DL (ref 11.7–15.7)
IMP: NOT DETECTED
KLEBSIELLA OXYTOCA: NOT DETECTED
KLEBSIELLA PNEUMONIAE: NOT DETECTED
KPC: NOT DETECTED
MAGNESIUM SERPL-MCNC: 2.3 MG/DL (ref 1.6–2.3)
MCH RBC QN AUTO: 30.8 PG (ref 26.5–33)
MCHC RBC AUTO-ENTMCNC: 32.2 G/DL (ref 31.5–36.5)
MCV RBC AUTO: 95 FL (ref 78–100)
NDM: NOT DETECTED
OXA (DETECTED/NOT DETECTED): NOT DETECTED
PHOSPHATE SERPL-MCNC: 2.8 MG/DL (ref 2.5–4.5)
PLATELET # BLD AUTO: 138 10E3/UL (ref 150–450)
POTASSIUM BLD-SCNC: 4.1 MMOL/L (ref 3.4–5.3)
PROT SERPL-MCNC: 5.7 G/DL (ref 6.8–8.8)
PROTEUS SPECIES: NOT DETECTED
PSEUDOMONAS AERUGINOSA: NOT DETECTED
RBC # BLD AUTO: 3.51 10E6/UL (ref 3.8–5.2)
SODIUM SERPL-SCNC: 143 MMOL/L (ref 133–144)
VIM: NOT DETECTED
WBC # BLD AUTO: 7.6 10E3/UL (ref 4–11)

## 2022-08-24 PROCEDURE — 36591 DRAW BLOOD OFF VENOUS DEVICE: CPT | Performed by: STUDENT IN AN ORGANIZED HEALTH CARE EDUCATION/TRAINING PROGRAM

## 2022-08-24 PROCEDURE — 83735 ASSAY OF MAGNESIUM: CPT | Performed by: STUDENT IN AN ORGANIZED HEALTH CARE EDUCATION/TRAINING PROGRAM

## 2022-08-24 PROCEDURE — 84100 ASSAY OF PHOSPHORUS: CPT | Performed by: STUDENT IN AN ORGANIZED HEALTH CARE EDUCATION/TRAINING PROGRAM

## 2022-08-24 PROCEDURE — 250N000013 HC RX MED GY IP 250 OP 250 PS 637: Performed by: STUDENT IN AN ORGANIZED HEALTH CARE EDUCATION/TRAINING PROGRAM

## 2022-08-24 PROCEDURE — 76770 US EXAM ABDO BACK WALL COMP: CPT

## 2022-08-24 PROCEDURE — 250N000011 HC RX IP 250 OP 636: Performed by: STUDENT IN AN ORGANIZED HEALTH CARE EDUCATION/TRAINING PROGRAM

## 2022-08-24 PROCEDURE — 80053 COMPREHEN METABOLIC PANEL: CPT | Performed by: INTERNAL MEDICINE

## 2022-08-24 PROCEDURE — 87040 BLOOD CULTURE FOR BACTERIA: CPT | Performed by: STUDENT IN AN ORGANIZED HEALTH CARE EDUCATION/TRAINING PROGRAM

## 2022-08-24 PROCEDURE — 258N000003 HC RX IP 258 OP 636: Performed by: STUDENT IN AN ORGANIZED HEALTH CARE EDUCATION/TRAINING PROGRAM

## 2022-08-24 PROCEDURE — 85027 COMPLETE CBC AUTOMATED: CPT | Performed by: INTERNAL MEDICINE

## 2022-08-24 PROCEDURE — 76770 US EXAM ABDO BACK WALL COMP: CPT | Mod: 26 | Performed by: RADIOLOGY

## 2022-08-24 PROCEDURE — 250N000011 HC RX IP 250 OP 636: Performed by: PHYSICIAN ASSISTANT

## 2022-08-24 PROCEDURE — 82248 BILIRUBIN DIRECT: CPT | Performed by: PHYSICIAN ASSISTANT

## 2022-08-24 PROCEDURE — 120N000002 HC R&B MED SURG/OB UMMC

## 2022-08-24 PROCEDURE — 99232 SBSQ HOSP IP/OBS MODERATE 35: CPT | Mod: GC | Performed by: FAMILY MEDICINE

## 2022-08-24 PROCEDURE — 250N000013 HC RX MED GY IP 250 OP 250 PS 637: Performed by: PHYSICIAN ASSISTANT

## 2022-08-24 PROCEDURE — 36415 COLL VENOUS BLD VENIPUNCTURE: CPT | Performed by: INTERNAL MEDICINE

## 2022-08-24 RX ORDER — FAMOTIDINE 20 MG/1
20 TABLET, FILM COATED ORAL
Status: DISCONTINUED | OUTPATIENT
Start: 2022-08-24 | End: 2022-08-26 | Stop reason: HOSPADM

## 2022-08-24 RX ORDER — PANTOPRAZOLE SODIUM 40 MG/1
40 TABLET, DELAYED RELEASE ORAL 2 TIMES DAILY
Status: DISCONTINUED | OUTPATIENT
Start: 2022-08-24 | End: 2022-08-26 | Stop reason: HOSPADM

## 2022-08-24 RX ORDER — HYDROMORPHONE HCL IN WATER/PF 6 MG/30 ML
0.4 PATIENT CONTROLLED ANALGESIA SYRINGE INTRAVENOUS EVERY 6 HOURS PRN
Status: DISCONTINUED | OUTPATIENT
Start: 2022-08-24 | End: 2022-08-26 | Stop reason: HOSPADM

## 2022-08-24 RX ORDER — HYDROMORPHONE HCL IN WATER/PF 6 MG/30 ML
0.4 PATIENT CONTROLLED ANALGESIA SYRINGE INTRAVENOUS EVERY 4 HOURS PRN
Status: DISCONTINUED | OUTPATIENT
Start: 2022-08-24 | End: 2022-08-24

## 2022-08-24 RX ADMIN — SODIUM CHLORIDE, POTASSIUM CHLORIDE, SODIUM LACTATE AND CALCIUM CHLORIDE 1000 ML: 600; 310; 30; 20 INJECTION, SOLUTION INTRAVENOUS at 01:20

## 2022-08-24 RX ADMIN — OXYCODONE HYDROCHLORIDE 10 MG: 5 TABLET ORAL at 09:36

## 2022-08-24 RX ADMIN — DULOXETINE 60 MG: 60 CAPSULE, DELAYED RELEASE ORAL at 07:51

## 2022-08-24 RX ADMIN — PANTOPRAZOLE SODIUM 40 MG: 40 TABLET, DELAYED RELEASE ORAL at 21:46

## 2022-08-24 RX ADMIN — HYDROXYZINE HYDROCHLORIDE 25 MG: 25 TABLET, FILM COATED ORAL at 13:40

## 2022-08-24 RX ADMIN — MEROPENEM 1 G: 1 INJECTION, POWDER, FOR SOLUTION INTRAVENOUS at 07:51

## 2022-08-24 RX ADMIN — CYCLOBENZAPRINE 10 MG: 10 TABLET, FILM COATED ORAL at 23:52

## 2022-08-24 RX ADMIN — MEROPENEM 1 G: 1 INJECTION, POWDER, FOR SOLUTION INTRAVENOUS at 16:37

## 2022-08-24 RX ADMIN — OXYCODONE HYDROCHLORIDE 10 MG: 5 TABLET ORAL at 05:02

## 2022-08-24 RX ADMIN — HYDROMORPHONE HYDROCHLORIDE 0.4 MG: 0.2 INJECTION, SOLUTION INTRAMUSCULAR; INTRAVENOUS; SUBCUTANEOUS at 14:54

## 2022-08-24 RX ADMIN — Medication 1 TABLET: at 07:51

## 2022-08-24 RX ADMIN — PANTOPRAZOLE SODIUM 40 MG: 40 TABLET, DELAYED RELEASE ORAL at 07:51

## 2022-08-24 RX ADMIN — CYCLOBENZAPRINE 10 MG: 10 TABLET, FILM COATED ORAL at 09:37

## 2022-08-24 RX ADMIN — OXYCODONE HYDROCHLORIDE 10 MG: 5 TABLET ORAL at 18:03

## 2022-08-24 RX ADMIN — OXYCODONE HYDROCHLORIDE 10 MG: 5 TABLET ORAL at 13:40

## 2022-08-24 RX ADMIN — CYANOCOBALAMIN TAB 1000 MCG 1000 MCG: 1000 TAB at 07:51

## 2022-08-24 RX ADMIN — HYDROXYZINE HYDROCHLORIDE 25 MG: 25 TABLET, FILM COATED ORAL at 05:02

## 2022-08-24 RX ADMIN — LIDOCAINE PATCH 4% 2 PATCH: 40 PATCH TOPICAL at 14:51

## 2022-08-24 RX ADMIN — FLUTICASONE PROPIONATE 1 SPRAY: 50 SPRAY, METERED NASAL at 07:51

## 2022-08-24 RX ADMIN — MEROPENEM 1 G: 1 INJECTION, POWDER, FOR SOLUTION INTRAVENOUS at 00:37

## 2022-08-24 RX ADMIN — FAMOTIDINE 20 MG: 20 TABLET ORAL at 16:37

## 2022-08-24 RX ADMIN — CYCLOBENZAPRINE 10 MG: 10 TABLET, FILM COATED ORAL at 18:03

## 2022-08-24 RX ADMIN — AMITRIPTYLINE HYDROCHLORIDE 75 MG: 25 TABLET, FILM COATED ORAL at 21:46

## 2022-08-24 RX ADMIN — HYDROMORPHONE HYDROCHLORIDE 0.4 MG: 0.2 INJECTION, SOLUTION INTRAMUSCULAR; INTRAVENOUS; SUBCUTANEOUS at 21:50

## 2022-08-24 RX ADMIN — HYDROMORPHONE HYDROCHLORIDE 0.4 MG: 0.2 INJECTION, SOLUTION INTRAMUSCULAR; INTRAVENOUS; SUBCUTANEOUS at 08:37

## 2022-08-24 ASSESSMENT — ACTIVITIES OF DAILY LIVING (ADL)
ADLS_ACUITY_SCORE: 18
WALKING_OR_CLIMBING_STAIRS_DIFFICULTY: NO
CONCENTRATING,_REMEMBERING_OR_MAKING_DECISIONS_DIFFICULTY: NO
ADLS_ACUITY_SCORE: 18
ADLS_ACUITY_SCORE: 18
TOILETING_ISSUES: NO
ADLS_ACUITY_SCORE: 18
DIFFICULTY_EATING/SWALLOWING: NO
ADLS_ACUITY_SCORE: 18
FALL_HISTORY_WITHIN_LAST_SIX_MONTHS: NO
ADLS_ACUITY_SCORE: 18
DRESSING/BATHING_DIFFICULTY: NO
ADLS_ACUITY_SCORE: 18
DOING_ERRANDS_INDEPENDENTLY_DIFFICULTY: NO
ADLS_ACUITY_SCORE: 18
ADLS_ACUITY_SCORE: 18
WEAR_GLASSES_OR_BLIND: NO
EQUIPMENT_CURRENTLY_USED_AT_HOME: CANE, STRAIGHT
ADLS_ACUITY_SCORE: 18
CHANGE_IN_FUNCTIONAL_STATUS_SINCE_ONSET_OF_CURRENT_ILLNESS/INJURY: NO

## 2022-08-24 NOTE — PLAN OF CARE
"/51 (BP Location: Right arm, Patient Position: Supine, Cuff Size: Adult Regular)   Pulse 83   Temp 98.3  F (36.8  C) (Oral)   Resp 14   Ht 1.702 m (5' 7\")   Wt 82.1 kg (181 lb)   LMP 09/23/2014   SpO2 93%   BMI 28.35 kg/m     Pt is A/O x4, independent in room. Pt complained headaches and flank pain, rated 8/10, managed with rest and medications.  Pt has baseline numbness in LE and UE bilateral, no new numbness or tingling .  Pt right port heparin lock.  Pt blood culture results positive for gram negative. Provider was notified, 1 bolus of LR was ordered and administered.  Pt urine culture results positive for e-coli. Provider was notified, no further action is required, current medication will cover per provider.  Pt refused lidocaine patch and acetaminophen. Pt will discussed with provider in AM.    Continue POC .                    "

## 2022-08-24 NOTE — PROGRESS NOTES
Patient does not have iv abx coverage in the home with their Medicare and BCBS supplement plan. Drug would be billed to the part D and supplies will be self-pay. Based on Meropenem 1g q8h, total cost is $79.98/ day for drug and supplies.     Nursing is only covered if patient is homebound if not cost is $90.00 per visit if [facility] bills for it. Patient should have coverage in a TCU or infusion center. Let us know how patient would like to proceed.        Please contact Intake with any questions, [phone] or In Basket pool,  Home Infusion ([phone]).

## 2022-08-24 NOTE — PROGRESS NOTES
[facility]  Progress Note - [facility]       Date of Admission:  8/22/2022    Family Medicine Progress Note - [facility]       Main Plans for Today   - Continue meropenem 1g q8hrs IV - awaiting for the sensitivity result  - Pain: oxycodone 5-10 mg Q4hr PRN + IV Dilaudid 0.4mg Q6hr PRN for breakthru  - Will plan to start de-escalation of pain regimen tomorrow   - Change famotidine to BID w/ food, and pantoprazole to BID     Assessment & Plan   Nathalie Bashir is a 56 year old F with PMHx of gastric adenocarcinoma s/p chemotherapy (last chemo on 8/23/2020) , s/p Whipple, gastric bypass, and partial gastrectomy in 2020 for pancreatic mass and found to have gastric cancer on the pathology, in surveillance, chemotherapy-induced neuropathy, chronic pain on chronic opioids, migraines, depression, GERD, chronic constipation, recurrent MDR UTIs, who presented with R sided chest pain, abdominal pain and flank pain was admitted on 8/23/22 for pyelonephritis.     # Pyelonephritis  # History of recurrent UTIs  # History of multi-drug resistant organisms  # Back pain  Presenting with R flank pain, dysuria, intermittent hematuria, UA grossly + with imaging consistent with pyelo. Afebrile. + leukocytosis.  Not meeting SIRS criteria. Patient with recurrent UTI since 4/2022, s/p treatment with most recent UCx 8/4/22 grew ESBL E coli. Follows with urology outpatient for recurrent UTIs. Has cystoscopy 4/28/22 which was without abnormality. No concern for retention, no concern for stones on prior imaging. Patient reported having mild R flank pain with prior UTIs, so possibly had pyelonephritis previously that was not adequately treated leading to recurrent infections. Initially started on Zosyn in ED, switched to meropenem by [facility] admitting provider. Kidney U/S (8/24/2022) shows: Normal study. No renal stone, mass, or hydronephrosis demonstrated.   -  Meropenam 1 gram IV Q8H and narrow as able once cultures return   - Repeat Blood culture  - Zofran and compazine PRN   - Pain management   - PO oxycodone 5-10 mg Q4H PRN   - IV Dilaudid 0.4 mg Q6H PRN for breakthru   - Will need close follow up with urology outpatient     Micro  8/22 - Urine Cx - >100,000 Ecoli  8/23 - COVID - negative  8/23 - Blood Cx Venous #1 - NGTD  8/23 - Blood Cx Venous #2 - NGTD   8/23 - Blood Cx peripheral - Positive x1 - Ecoli  8/23 - Verigene Panel - Ecoli   8/24 - Blood Cx Right Arm - Pending   8/24 - Blood Cx Portacath - Pending    Antibiotics  Zosyn (8/23-8/23)  Meropenem (8/23- )      # Chronic pain (hip, neck, neuropathy)   Follows with palliative, neurology, and PM&R outpatient.   - Continue PTA neuropath regimen    - amitriptyline 75mg daily   - duloxetine 60mg daily   - flexeril 10mg TID  - Pain regimen    - PTA regiem is oxycodone 5mg QID PRN, acute regimen as noted in above problem    -.Ice and Heat PRN    - Acetaminophen/Gabapentin/Lyrica/Toradol/Naprozen allergies   - Avoiding NSAIDs d/t h/o gastric bypass    # Transaminitis, resolving  Mild elevations in LFTs on admit. Normal T bili. CT abd/pelvis with Postcholecystectomy. Biliary enteric anastomosis. Mild pneumobilia. No acute process.   - Trend LFTs        # Pulmonary infiltrates, left lower lobe  # Pulmonary nodules  Patient noted R sided lower chest pain on admission -> CTPE demonstrated only incidental LLL infiltrate.  - Monitor   - Continue incentive spirometry     # Mental health: Depression, anxiety and opioid dependency:  Pt has chronic depression & on Cymbalta 60 mg that works for her symptoms. She is on Hydroxyzine that works for her anxiety symptoms.   - PTA Hydroxyzine 25-50mg QID PRN      # History of gastric cancer, S/p Whipple Procedure   Incidentally found during Whipple 1/2020 for presumed IPMN, s/p neoadjuvant chemotherapy and curative intent surgery 10/2020. Last saw oncology 7/27/22. In surveillance  "period with plan for repeat CT CAP Q6 months. See more detailed history in Oncology 7/27/22 and PM&R note 6/22/22.  - Outpatient follow up with oncology as previously scheduled  - Consider further risk/benefit discussion of port, as she has had it 2 years+      # Normocytic Anemia, chronic  Hgb 11.1 on admission. Baseline 11-12.   - Trend CBC     # Colonic diverticulosis  Incidentally noted on CT abdomen pelvis. Has been seen on previous CTs. Last colonoscopy 2018 which noted findings for diverticulosis, but no other findings.      # Migraines with aura  - Continue PTA sumatriptan 100 mg PRN      # Chronic constipation  - Continue PTA senna and simethicone     # GERD  - Continue PTA pantoprazole 40 mg daily and famotidine 20 mg BID w/ food     # Seasonal allergies   - PTA flonase daily     Diet: Combination Diet Regular Diet Adult; Other - please comment    DVT Prophylaxis: Low Risk/Ambulatory with no VTE prophylaxis indicated  Fluids: PO  Hernandez Catheter: Not present  Central Lines: PRESENT        Cardiac Monitoring: None  Code Status: Full Code          Clinically Significant Risk Factors Present on Admission                 # Hypoalbuminemia: Albumin = 3.2 g/dL (Ref range: 3.5 - 5.2 g/dL) on admission, will monitor as appropriate        # Overweight: Estimated body mass index is 28.35 kg/m  as calculated from the following:    Height as of this encounter: 1.702 m (5' 7\").    Weight as of this encounter: 82.1 kg (181 lb).      Disposition Plan  Expected Discharge Date: 08/25/2022      Destination: home with family  Discharge Comments: Needing IV antibiotics. May transition to orals in the next few days.        The patient's care was discussed with the Attending Physician, Dr. Goss.    Carina Cline,  ALIYAH Student    I assessed the patient with the student, and reviewed the plan and note with the attending. I have made relevant changes to the note above and agree with findings and plan.    Demetrius Gallegos, " DO  PGY2 Family Medicine Resident   [facility]    Department of Family Medicine and [facility]  Pager: [phone]  Please see sticky note for cross cover information  ______________________________________________________________________        Interval History:  Peripheral blood culture from 0950 & the pt was given 1L LR bolus run 250ml/hr. Pt was lying comfortable in bed, stating that she feels better today and she scales the Rt sided back pain as 5/10, improve with pain medication.     Data reviewed today: I reviewed all medications, new labs and imaging results over the last 24 hours. I personally reviewed the KUB U/S: Normal study. No renal stone, mass, or hydronephrosis demonstrated.     Social history: She is retired, live with her  who is supportive. She has one adult child. She denied smoking, drinking and using drugs.       Past Medical History        Past Medical History:   Diagnosis Date     Allergic rhinitis       Cancer (H)       stomach cancer     Chronic pain       Depression       Gastric adenocarcinoma (H)       Lumbago       Migraine       Opioid dependence (H)       Other chronic pain       low back     Sacroiliitis (H)       low back pain     Trochanteric bursitis       leg length discrrepancy, hip pain            Past Surgical History         Past Surgical History:   Procedure Laterality Date     ARTHROPLASTY HIP Left 2016     BACK SURGERY         L4-5 decompression     CATARACT IOL, RT/LT         CHOLECYSTECTOMY N/A 1/28/2020     Procedure: Cholecystectomy;  Surgeon: Yandel Mallory MD;  [facility]     ENDOSCOPIC RETROGRADE CHOLANGIOPANCREATOGRAM N/A 7/27/2020     Procedure: ENDOSCOPIC RETROGRADE CHOLANGIOPANCREATOGRAPHY  **Latex Allergy** with jejunal biopsies;  Surgeon: Jeet Aviles MD;  [facility]     ESOPHAGOSCOPY, GASTROSCOPY, DUODENOSCOPY (EGD), COMBINED  N/A 3/3/2020     Procedure: ESOPHAGOGASTRODUODENOSCOPY, WITH ENDOSCOPIC US (enoxaparin);  Surgeon: Bakari Plata MD;  [facility]: [facility] GI     ESOPHAGOSCOPY, GASTROSCOPY, DUODENOSCOPY (EGD), COMBINED N/A 12/7/2020     Procedure: Upper Endoscopy with stent placement;  Surgeon: Jeet Aviles MD;  Location:  OR     ESOPHAGOSCOPY, GASTROSCOPY, DUODENOSCOPY (EGD), COMBINED N/A 2/8/2021     Procedure: ESOPHAGOGASTRODUODENOSCOPY (EGD)AND STENT REMOVAL;  Surgeon: Jeet Aviles MD;  Location:  OR     EYE SURGERY         GASTRECTOMY N/A 10/21/2020     Procedure: Open subtotal gastectomy with David en Y Reconstruction; D1 Lymph Node Disection  **Latex Allergy**;  Surgeon: Yandel Mallory MD;  [facility]     IR CHEST PORT PLACEMENT > 5 YRS OF AGE   3/26/2020     IR LUMBAR EPIDURAL STEROID INJECTION   8/25/2009     IR LUMBAR EPIDURAL STEROID INJECTION   9/2/2009     IR LUMBAR EPIDURAL STEROID INJECTION   9/25/2009     IR LUMBAR EPIDURAL STEROID INJECTION   11/23/2009     IR LUMBAR EPIDURAL STEROID INJECTION   4/1/2010     IR LUMBAR EPIDURAL STEROID INJECTION   9/1/2010     IR LUMBAR EPIDURAL STEROID INJECTION   3/10/2011     IR LUMBAR EPIDURAL STEROID INJECTION   8/30/2011     LAPAROSCOPY DIAGNOSTIC (GENERAL) N/A 10/13/2020     Procedure: Diagnostic laparoscopy with peritoneal washings  **Latex Allergy**;  Surgeon: Yandel Mallory MD;  [facility]     OPEN REDUCTION INTERNAL FIXATION RODDING INTRAMEDULLARY FEMUR Left 12/23/2014     Procedure: OPEN REDUCTION INTERNAL FIXATION RODDING INTRAMEDULLARY FEMUR;  Surgeon: Arnol Santiago MD;  Location:  OR     ORTHOPEDIC SURGERY         PICC DOUBLE LUMEN PLACEMENT Right 02/07/2020     5 fr double lumen 41 cm     MT LAMINEC/FACETECT/FORAMIN,LUMBAR 1 SEG         Description: Laminectomy Decompress, Facetectomy, Foraminotomy Lumbar Seg;  Recorded: 04/12/2012;     REMOVE HARDWARE LOWER EXTREMITY Left 4/7/2015     Procedure: REMOVE HARDWARE LOWER  "EXTREMITY;  Surgeon: Arnol Santiago MD;  [facility]: [facility] OR     REMOVE HARDWARE RODDING INTRAMEDULLARY FEMUR Left 12/17/2015     Procedure: REMOVE HARDWARE RODDING INTRAMEDULLARY FEMUR;  Surgeon: Arnol Santiago MD;  [facility] OR     RESECT BONE LOWER EXTREMITY Left 12/23/2014     Procedure: RESECT BONE LOWER EXTREMITY;  Surgeon: Arnol Santiago MD;  [facility] OR     SHORTENING OSTEOPLASTY FEMUR W/ IM NAILING         WHIPPLE PROCEDURE N/A 1/28/2020     Procedure: Open Whipple procedure and Cholecystectomy;  Surgeon: Yandel aMllory MD;  Location: [facility] OR     WHIPPLE PROCEDURE         ZZC FUSION OF SACROILIAC JOINT         Description: Arthrodesis Sacroiliac Joint Left;  Recorded: 10/07/2011;     ZZC REPAIR DETACH RETINA,SCLERAL BUCKLE                      Physical Exam  Vital Signs:  /61 (BP Location: Right arm)   Pulse 86   Temp 98.7  F (37.1  C) (Oral)   Resp 15   Ht 1.702 m (5' 7\")   Wt 82.1 kg (181 lb)   LMP 09/23/2014   SpO2 95%   BMI 28.35 kg/m       GENERAL: Alert and awake. Answering questions appropriately. NAD. Pleasant and conversational.   HEENT: AT/NC. Anicteric sclera. Mucous membranes moist. Anisocoria.   CARDIOVASCULAR: RRR. S1, S2. No murmurs, rubs, or gallops.   RESPIRATORY: Effort normal on RA. Clear to auscultation bilaterally, no rales, rhonchi or wheezes, respirations unlabored   GI: Big mid-line Abdominal scar, Abdomen soft, non tender, normoactive bowel sounds present. R CVA tenderness, none on L.   : + R CVAT  EXTREMITIES: No peripheral edema. No calf asymmetry, erythema, or tenderness.   NEUROLOGICAL: CN II-XII grossly intact. Moving all extremities symmetrically.   Sensations on hands and feet is intact B/L  SKIN: Intact. Warm and dry.  No jaundice.     Data   Recent Labs   Lab 08/24/22  1027 08/23/22  0805 08/22/22  1834   WBC 7.6 11.2* 9.2   HGB 10.8* 11.0* 11.1*   MCV 95 94 97   * 154 212    135* 137   POTASSIUM 4.1 4.3 4.5   CHLORIDE 112* 101 103 "   CO2 30 27 28   BUN 8 12.0 11.9   CR 0.91 0.93 0.95   ANIONGAP 1* 7 6*   PETRA 8.3* 8.3* 8.4*   GLC 91 145* 98   ALBUMIN 2.5* 3.2* 3.6   PROTTOTAL 5.7* 5.7* 6.1*   BILITOTAL 0.5 0.8 0.5   ALKPHOS 103 107* 103   ALT 35 39* 30   AST 20 45* 33   LIPASE  --   --  7*     Recent Results (from the past 24 hour(s))   US Renal Complete    Narrative    ULTRASOUND RENAL COMPLETE 8/24/2022 11:36 AM    CLINICAL HISTORY: 55 y/o w/ recurrent MDR UTI, would like alternate  modality to assess for presence of nephroliths. Bilateral renal US  desired..      COMPARISONS: CT 8/23/2022.    ORDERING PROVIDER: LITZY PRECIADO    TECHNIQUE: Kidneys evaluated with grayscale and color Doppler  ultrasound. Bladder evaluated with grayscale imaging. Cine clips  through the kidneys were saved in the patient's record.    FINDINGS:  RIGHT KIDNEY:       Size: 4.2 x 4.7 x 9.9 cm         Cortical thickness: 1.0 cm         Parenchyma: Normal. No stone, mass, or hydronephrosis.    LEFT KIDNEY:       Size: 3.8 x 4.5 x 10 cm         Cortical thickness: 1.1 cm         Parenchyma: Normal. No stone, mass, or hydronephrosis.    BLADDER: Partially distended.      Impression    IMPRESSION: Normal study. No renal stone, mass, or hydronephrosis  demonstrated.    FLAVIA SRIVASTAVA MD         SYSTEM ID:  [id number]     Medications       amitriptyline  75 mg Oral At Bedtime     cyanocobalamin  1,000 mcg Oral Daily     DULoxetine  60 mg Oral Daily     famotidine  20 mg Oral BID AC     fluticasone  1 spray Both Nostrils Daily     lidocaine  2 patch Transdermal Q24h    And     lidocaine   Transdermal Q8H PRIYANK     meropenem  1 g Intravenous Q8H     multivitamin w/minerals  1 tablet Oral Daily     pantoprazole  40 mg Oral BID     sodium chloride (PF)  3 mL Intracatheter Q8H

## 2022-08-24 NOTE — PROGRESS NOTES
Care Coordinator Progress Note    Admission Date/Time:  8/22/2022  Attending MD:  Marimar Goss DO D  Coordination of Care and Referrals: Provided patient/family with options for Skilled Nursing Facility, TCU.        Assessment  Patient does not have iv abx coverage in the home with their Medicare and BCBS supplement plan. Drug would be billed to the part D and supplies will be self-pay. Based on Meropenem 1g q8h, total cost is $79.98/ day for drug and supplies.  Nursing is only covered if patient is homebound if not cost is $90.00 per visit if [facility] bills for it. Patient should have coverage in a TCU or infusion center. Let us know how patient would like to proceed.    Spoke with patient and provided private pay cost for IV BAX, declined sending to [facility] for lower rate, Gave patient options of OP infusion and TCU, patient opted for TCU-will send referrlas closer to home in [place].    [facility] AND [facility]               Plan  Anticipated Discharge Date:  TBN  Anticipated Discharge Plan:  TCU    Nadeen LAWSONN RN CCM  RN Care Coordinator 8A and 10 ICU  [address] [place] [address]  [email or web address]   Office: (10 ICU) [phone]   Pager: [phone]    For Weekend & Holiday on call RN Care Coordinator:  (Tasks: Home care, home infusion, medical equipment/oxygen, transportation, IMM & OWEN forms, etc.)   [place] [facility] [place] ([id number]) Saturday & Sunday; (1554-3413)  Recognized Holidays  Pager #1: [phone] Units: 4A, 4C, 4E, 5A & 5B   Pager #2: [phone] Units: 6A, 6B, 6C, 6D  Pager #3: [phone] Units: 7A, 7B, 7C, 7D & 5C   Pager #4: [phone] Units: 5 Ortho, 8A, 10 ICU, & [facility]      For Weekend & Holiday on call Social Work:  (Tasks: TCU, transportation, Hospice, adjustment to illness counseling, Health Care  Directives, Child Protection and Domestic Violence concerns, Vulnerable Adult, IMM forms, etc.)   [place] (0800 - 1630) Saturday and Sunday  Pager: [phone] Units: 4A, 4C, 4E, 5A and 5B   Pager: [phone] Units: 6A, 6B, 6C, 6D   Pager: [phone]: 7A, 7B, 7C, 7D, and 5C      [facility] [place] ([phone]) Saturday and Sunday  Units: 5 Ortho, 8A, and 10 ICU   Pager: [phone]

## 2022-08-24 NOTE — TELEPHONE ENCOUNTER
[facility] Call Center    Phone Message    May a detailed message be left on voicemail: yes     Reason for Call: Patient calling to let Dr. Velez know that she is currently in hospital for bladder and kidney infection. Wanted Dr. Velez to know and review her chart. Thank you    Action Taken: Message routed to:  [facility] ([facility] Uro    Travel Screening: Not Applicable

## 2022-08-24 NOTE — PROGRESS NOTES
Bacteremia - Gram neg bacilli 8/23/22 peripheral blood cx  Hx Urine Cx 8/4/22 w/ MDR ESBL E.Coli    Chart reviewed after gram negative bacilli resulted 1/2 bottles in 8/23/22 Peripheral blood culture from 0950. Pt on meropenem 1g IV q8hrs. Reviewed VS, afebrile, normotensive, appropriate HR, Rr, spo2 and  VS unchanged over last 12hrs. Plan:  -continue monitoring blood cultures   -continue meropenem 1g q8hrs IV  -1L LR bolus run 250ml/hr over next 4 hours  -ID Consult in DO [facility], PGY3

## 2022-08-24 NOTE — PLAN OF CARE
Pain 8-9/10  Went for walk in hallway   Using IV Diluadid and oral oxy q4  Ultrasound done today  Lidociane patch applied this afternoon

## 2022-08-24 NOTE — PLAN OF CARE
Pt using IV dilaudid for breakthrough pain  Oxy 10 mg q4  abx  Right port heparin lock  A&ox4  Tired this morning   Ask doctors to introduce themselves/add name to white board for patient   IND in room   Baseline numbness   Pt reported allergy to tylenol

## 2022-08-25 LAB
ANION GAP SERPL CALCULATED.3IONS-SCNC: 1 MMOL/L (ref 3–14)
BACTERIA BLD CULT: ABNORMAL
BACTERIA BLD CULT: ABNORMAL
BUN SERPL-MCNC: 7 MG/DL (ref 7–30)
CALCIUM SERPL-MCNC: 8.4 MG/DL (ref 8.5–10.1)
CHLORIDE BLD-SCNC: 108 MMOL/L (ref 94–109)
CO2 SERPL-SCNC: 34 MMOL/L (ref 20–32)
CREAT SERPL-MCNC: 0.93 MG/DL (ref 0.52–1.04)
CRP SERPL-MCNC: 59 MG/L (ref 0–8)
ERYTHROCYTE [DISTWIDTH] IN BLOOD BY AUTOMATED COUNT: 15.2 % (ref 10–15)
GFR SERPL CREATININE-BSD FRML MDRD: 72 ML/MIN/1.73M2
GLUCOSE BLD-MCNC: 97 MG/DL (ref 70–99)
HCT VFR BLD AUTO: 33.5 % (ref 35–47)
HGB BLD-MCNC: 10.6 G/DL (ref 11.7–15.7)
MAGNESIUM SERPL-MCNC: 2.4 MG/DL (ref 1.6–2.3)
MCH RBC QN AUTO: 30 PG (ref 26.5–33)
MCHC RBC AUTO-ENTMCNC: 31.6 G/DL (ref 31.5–36.5)
MCV RBC AUTO: 95 FL (ref 78–100)
PHOSPHATE SERPL-MCNC: 3.2 MG/DL (ref 2.5–4.5)
PLATELET # BLD AUTO: 166 10E3/UL (ref 150–450)
POTASSIUM BLD-SCNC: 4.5 MMOL/L (ref 3.4–5.3)
RBC # BLD AUTO: 3.53 10E6/UL (ref 3.8–5.2)
SODIUM SERPL-SCNC: 143 MMOL/L (ref 133–144)
WBC # BLD AUTO: 5.6 10E3/UL (ref 4–11)

## 2022-08-25 PROCEDURE — 85014 HEMATOCRIT: CPT | Performed by: INTERNAL MEDICINE

## 2022-08-25 PROCEDURE — 99233 SBSQ HOSP IP/OBS HIGH 50: CPT | Mod: GC | Performed by: FAMILY MEDICINE

## 2022-08-25 PROCEDURE — 36415 COLL VENOUS BLD VENIPUNCTURE: CPT | Performed by: STUDENT IN AN ORGANIZED HEALTH CARE EDUCATION/TRAINING PROGRAM

## 2022-08-25 PROCEDURE — 80048 BASIC METABOLIC PNL TOTAL CA: CPT | Performed by: INTERNAL MEDICINE

## 2022-08-25 PROCEDURE — 250N000011 HC RX IP 250 OP 636: Performed by: PHYSICIAN ASSISTANT

## 2022-08-25 PROCEDURE — 250N000013 HC RX MED GY IP 250 OP 250 PS 637: Performed by: STUDENT IN AN ORGANIZED HEALTH CARE EDUCATION/TRAINING PROGRAM

## 2022-08-25 PROCEDURE — 250N000011 HC RX IP 250 OP 636: Performed by: INTERNAL MEDICINE

## 2022-08-25 PROCEDURE — 250N000011 HC RX IP 250 OP 636: Performed by: STUDENT IN AN ORGANIZED HEALTH CARE EDUCATION/TRAINING PROGRAM

## 2022-08-25 PROCEDURE — 120N000002 HC R&B MED SURG/OB UMMC

## 2022-08-25 PROCEDURE — 250N000013 HC RX MED GY IP 250 OP 250 PS 637: Performed by: PHYSICIAN ASSISTANT

## 2022-08-25 PROCEDURE — 87040 BLOOD CULTURE FOR BACTERIA: CPT | Performed by: STUDENT IN AN ORGANIZED HEALTH CARE EDUCATION/TRAINING PROGRAM

## 2022-08-25 PROCEDURE — 83735 ASSAY OF MAGNESIUM: CPT | Performed by: STUDENT IN AN ORGANIZED HEALTH CARE EDUCATION/TRAINING PROGRAM

## 2022-08-25 PROCEDURE — 36415 COLL VENOUS BLD VENIPUNCTURE: CPT | Performed by: INTERNAL MEDICINE

## 2022-08-25 PROCEDURE — 86140 C-REACTIVE PROTEIN: CPT | Performed by: STUDENT IN AN ORGANIZED HEALTH CARE EDUCATION/TRAINING PROGRAM

## 2022-08-25 PROCEDURE — 84100 ASSAY OF PHOSPHORUS: CPT | Performed by: STUDENT IN AN ORGANIZED HEALTH CARE EDUCATION/TRAINING PROGRAM

## 2022-08-25 RX ORDER — CEFTRIAXONE 2 G/1
2 INJECTION, POWDER, FOR SOLUTION INTRAMUSCULAR; INTRAVENOUS EVERY 24 HOURS
Status: DISCONTINUED | OUTPATIENT
Start: 2022-08-25 | End: 2022-08-26

## 2022-08-25 RX ADMIN — OXYCODONE HYDROCHLORIDE 10 MG: 5 TABLET ORAL at 18:57

## 2022-08-25 RX ADMIN — Medication 1 TABLET: at 08:47

## 2022-08-25 RX ADMIN — HYDROXYZINE HYDROCHLORIDE 50 MG: 25 TABLET, FILM COATED ORAL at 18:22

## 2022-08-25 RX ADMIN — ONDANSETRON 4 MG: 4 TABLET, ORALLY DISINTEGRATING ORAL at 10:39

## 2022-08-25 RX ADMIN — MEROPENEM 1 G: 1 INJECTION, POWDER, FOR SOLUTION INTRAVENOUS at 08:47

## 2022-08-25 RX ADMIN — CEFTRIAXONE SODIUM 2 G: 2 INJECTION, POWDER, FOR SOLUTION INTRAMUSCULAR; INTRAVENOUS at 10:41

## 2022-08-25 RX ADMIN — PANTOPRAZOLE SODIUM 40 MG: 40 TABLET, DELAYED RELEASE ORAL at 20:11

## 2022-08-25 RX ADMIN — FAMOTIDINE 20 MG: 20 TABLET ORAL at 18:58

## 2022-08-25 RX ADMIN — OXYCODONE HYDROCHLORIDE 10 MG: 5 TABLET ORAL at 04:51

## 2022-08-25 RX ADMIN — OXYCODONE HYDROCHLORIDE 10 MG: 5 TABLET ORAL at 14:42

## 2022-08-25 RX ADMIN — HYDROMORPHONE HYDROCHLORIDE 0.4 MG: 0.2 INJECTION, SOLUTION INTRAMUSCULAR; INTRAVENOUS; SUBCUTANEOUS at 13:14

## 2022-08-25 RX ADMIN — FLUTICASONE PROPIONATE 1 SPRAY: 50 SPRAY, METERED NASAL at 08:47

## 2022-08-25 RX ADMIN — CYANOCOBALAMIN TAB 1000 MCG 1000 MCG: 1000 TAB at 08:46

## 2022-08-25 RX ADMIN — MEROPENEM 1 G: 1 INJECTION, POWDER, FOR SOLUTION INTRAVENOUS at 01:02

## 2022-08-25 RX ADMIN — LIDOCAINE PATCH 4% 2 PATCH: 40 PATCH TOPICAL at 20:10

## 2022-08-25 RX ADMIN — FAMOTIDINE 20 MG: 20 TABLET ORAL at 08:45

## 2022-08-25 RX ADMIN — SENNOSIDES AND DOCUSATE SODIUM 1 TABLET: 8.6; 5 TABLET ORAL at 18:57

## 2022-08-25 RX ADMIN — HYDROMORPHONE HYDROCHLORIDE 0.4 MG: 0.2 INJECTION, SOLUTION INTRAMUSCULAR; INTRAVENOUS; SUBCUTANEOUS at 00:57

## 2022-08-25 RX ADMIN — AMITRIPTYLINE HYDROCHLORIDE 75 MG: 25 TABLET, FILM COATED ORAL at 22:06

## 2022-08-25 RX ADMIN — CYCLOBENZAPRINE 10 MG: 10 TABLET, FILM COATED ORAL at 18:21

## 2022-08-25 RX ADMIN — HYDROXYZINE HYDROCHLORIDE 50 MG: 25 TABLET, FILM COATED ORAL at 08:58

## 2022-08-25 RX ADMIN — OXYCODONE HYDROCHLORIDE 10 MG: 5 TABLET ORAL at 08:46

## 2022-08-25 RX ADMIN — DULOXETINE 60 MG: 60 CAPSULE, DELAYED RELEASE ORAL at 08:46

## 2022-08-25 RX ADMIN — PANTOPRAZOLE SODIUM 40 MG: 40 TABLET, DELAYED RELEASE ORAL at 08:46

## 2022-08-25 RX ADMIN — OXYCODONE HYDROCHLORIDE 10 MG: 5 TABLET ORAL at 22:46

## 2022-08-25 RX ADMIN — HYDROMORPHONE HYDROCHLORIDE 0.4 MG: 0.2 INJECTION, SOLUTION INTRAMUSCULAR; INTRAVENOUS; SUBCUTANEOUS at 20:09

## 2022-08-25 RX ADMIN — HYDROXYZINE HYDROCHLORIDE 50 MG: 25 TABLET, FILM COATED ORAL at 00:57

## 2022-08-25 RX ADMIN — DICLOFENAC SODIUM 4 G: 10 GEL TOPICAL at 18:58

## 2022-08-25 ASSESSMENT — ACTIVITIES OF DAILY LIVING (ADL)
ADLS_ACUITY_SCORE: 18

## 2022-08-25 NOTE — PROGRESS NOTES
PRN Oxycodone and Atarax given at 0900 with morning med pass. Regular diet. Independent in room and ambulating in the hallway. IV meropenem given at 0845. IV ceftriaxone given at 1045.All new tubing placed with new ceftriaxone bag. Both antibiotics administered without incident. PRN Zofran given at 1100 for nausea. PRN IV Dilaudid administered at 1315. Continent of bowel and bladder. Takes pills whole. No Lidocaine patch in place.

## 2022-08-25 NOTE — PROGRESS NOTES
Antimicrobial Stewardship Team Note    Antimicrobial Stewardship Program - A joint venture between [facility] and  Physicians to optimize antibiotic management.  NOT a formal consult - Restricted Antimicrobial Review     Patient: Nathalie Bashir  MRN: [id number]  Allergies: Gluten meal, Augmentin, Pregabalin, Topiramate, Acetaminophen, Dust mite extract, Gabapentin, Ketorolac, Latex, Methadone, Mold, Naprosyn [naproxen], and Seasonal allergies    Brief Summary: Nathalie Bashir is a 56 year old female with PMH of gastric adenocarcinoma s/p chemotherapy, gastric bypass and partial gastrectomy (2020), s/p Whipple, chemotherapy induced neuropathy, GERD, depression, recurrent MDR UTI who was admitted on 8/23/2022 with abdominal pain, right side chest pain and flank plain.       HPI: Prior to admission patient reports having history of UTI's with antibiotic treatment failure. On 8/4, patient presented to clinic with urinary urgency, frequency, and dysuria started on nitrofurantoin for possible UTI. Urine culture turned positive with >100,000 CFU/mL ESBL E.coli, susceptible to nitrofurantoin. Patient presented to the ED on 8/22 with right-sided chest and abdominal pain. Vitals notable for temp 99.5, tachycardic (107) with all other vital signs within normal limits. Lab notable for mild leukocytosis (WBC 11.2, ANC 9.4) and elevated CRP 63.90. UA inflammatory with > 182 WBCs, large LE, and negative nitrites. CT  abdomen/pelvis with contrast showed ascending UTI and pyelonephritis on the right and cystitis. Renal US without renal stone, mass, or hydronephrosis.Patient was initially started on empiric Zosyn for UTI, later switched to meropenem due to recent history of ESBL E coli UTI.Blood culture from 8/23 positive in 1 out of 3 sets (from peripheral draw) with E.coli, resistance gene not detected on Verigene. E coli susceptibility finalized today with resistance to ampicillin and gentamicin only. Urine  culture also grew >100,000 CFU/mL E.coli, with similar susceptibility. Meropenem deescalated to ceftriaxone IV today, 8/25.             Active Anti-infective Medications   (From admission, onward)                 Start     Stop    08/23/22 0845  meropenem  1 g,   Intravenous,   EVERY 8 HOURS        Urinary Tract Infection    Pyelonephritis        08/30/22 0844                  Assessment: E. coli Bacteremia secondary to pyelonephritis.   Patient presented with right-sided chest and abdominal pain found to have pyelonephritis and cystis as well as E coli bacteremia. Source of bacteremia is most likely 2/2 UTI as evidenced by imaging and urine culture. Patient has a portocather in place which hasn't been used in a while and without reports of acute swelling,  redness, and pain thus unlikely the source of bacteremia. Furthermore, blood cultures from port have remained without growth, reassuring port is not colonized with E coli. Though patient presented with RUQ pain there is no evidence of infection on abdominal imaging-unclear if related to chronic pain in the setting of gastric adenocarcinoma. Patient is currently on day 3 of antibiotics and has remained afebrile afebrile without antipyretics and is hemodynamically stable with WBC wnl throughout admission. Agree with de-escalation of meropenem to ceftriaxone based on E coli susceptibility in blood and urine. Fluoroquinolones (levofloxacin and ciprofloxacin) and Bactrim have excellent oral bioavailability and tissue penetration thus favorable oral step down therapy for pyelonephritis and gram-negative bacteremia. With clinical improvement, we recommend oral stepdown to levofloxacin at discharge to complete a duration of 7-10 days.    Recommendations:  1. Agree with deescalating meropenem to ceftriaxone. Step down to levofloxacin 750 mg PO daily with clinical improvement to complete a total of 7-10 days    Discussed with ID Staff JORGE De Santiago and Julieta  Jaguar Martinez, BCIDP    Ilan Pitts PharmD Student 2023  Phone#: [phone]    Vital Signs/Clinical Features:  Vitals  Report        08/23 0700 08/24 0659 08/24 0700 08/25 0659 08/25 0700  08/25 1557   Most Recent      Temp ( F) 97.7 -  98.4    98.5 -  98.8    98.1 -  98.3     98.3 (36.8) 08/25 1546    Pulse 83 -  92    86 -  101    85 -  92     92 08/25 1546    Resp 14 -  16    15 -  18      18     18 08/25 1546    BP 95/62 -  116/67    104/64 -  114/61    105/70 -  112/67     105/70 08/25 1546    SpO2 (%) 93 -  98    95 -  98    92 -  97     92 08/25 1546            Labs  Estimated Creatinine Clearance: 75.3 mL/min (based on SCr of 0.93 mg/dL).  Recent Labs   Lab Test 11/15/21  1029 05/09/22  1023 08/22/22  1834 08/23/22  0805 08/24/22  1027 08/25/22  0618   CR 0.91 0.98 0.95 0.93 0.91 0.93       Recent Labs   Lab Test 10/10/20  1040 10/13/20  0647 10/16/20  0925 10/21/20  1345 11/04/20  1646 11/06/20  0643 11/20/20  1121 12/07/20  1214 03/22/21  1202 05/21/21  1040 11/15/21  1029 05/09/22  1023 05/19/22  1113 08/22/22  1834 08/23/22  0805 08/24/22  1027 08/25/22  0618   WBC 4.5  --  5.8   < > 8.6   < > 5.8   < > 10.8 4.8   < > 4.0 6.8 9.2 11.2* 7.6 5.6   ANEU 2.4  --  3.8  --  5.9  --  3.5  --  8.3 2.8  --   --   --   --   --   --   --    ALYM 1.4  --  1.2  --  1.9  --  1.7  --  1.7 1.5  --   --   --   --   --   --   --    LENIN 0.4  --  0.4  --  0.5  --  0.4  --  0.7 0.3  --   --   --   --   --   --   --    AEOS 0.2  --  0.3  --  0.3  --  0.1  --  0.1 0.1  --   --   --   --   --   --   --    HGB 7.7*   < > 9.9*   < > 8.9*   < > 9.8*   < > 11.1* 12.2   < > 10.5* 11.6* 11.1* 11.0* 10.8* 10.6*   HCT 25.4*  --  33.1*   < > 30.2*   < > 32.2*   < > 35.2 38.2   < > 32.5* 35.7 35.5 34.4* 33.5* 33.5*   MCV 87  --  89   < > 92   < > 92   < > 98 98   < > 96 94 97 94 95 95     --  235   < > 401   < > 243   < > 187 197   < > 222 269 212 154 138* 166    < > = values in this interval not displayed.       Recent Labs    Lab Test 05/21/21  1040 11/15/21  1029 05/09/22  1023 08/22/22  1834 08/23/22  0805 08/24/22  1027   BILITOTAL 0.4 0.4 1.0 0.5 0.8 0.5   ALKPHOS 176* 164* 132 103 107* 103   ALBUMIN 3.4 3.3* 3.2* 3.6 3.2* 2.5*   AST 31 21 39 33 45* 20   ALT 42 29 44 30 39* 35       Recent Labs   Lab Test 11/30/16  1130 12/07/16  1200 02/04/20  1616 05/21/20  1251 06/15/20  1405 07/20/20  1509 07/27/20  0434 07/28/20  0940 07/29/20  0242 08/21/20  1755 08/23/22  0805 08/25/22  0618   PCAL  --   --   --  <0.05  --   --   --   --   --   --   --   --    LACT  --   --    < > 1.0 1.4 1.5 0.6* 0.8  --  2.4*  --   --    CRP <2.9 14.3*  --   --   --  11.0*  --   --  11.0*  --  63.90* 59.0*   SED  --   --   --   --   --  27  --   --   --   --   --   --     < > = values in this interval not displayed.             Culture Results:  7-Day Micro Results       Procedure Component Value Units Date/Time    Blood Culture Arm, Right [[id number]] Collected: 08/25/22 1106    Order Status: Resulted Lab Status: In process Updated: 08/25/22 1124    Specimen: Blood from Arm, Right     Blood Culture Arm, Left [[id number]] Collected: 08/25/22 1106    Order Status: Resulted Lab Status: In process Updated: 08/25/22 1124    Specimen: Blood from Arm, Left     Blood Culture Hand, Right [[id number]]  (Normal) Collected: 08/24/22 1645    Order Status: Completed Lab Status: Preliminary result Updated: 08/25/22 0606    Specimen: Blood from Hand, Right      Culture No growth after 12 hours    Blood Culture Hand, Right [[id number]]  (Normal) Collected: 08/24/22 1645    Order Status: Completed Lab Status: Preliminary result Updated: 08/25/22 0606    Specimen: Blood from Hand, Right      Culture No growth after 12 hours    Blood Culture Portacath [[id number]]  (Normal) Collected: 08/24/22 1042    Order Status: Completed Lab Status: Preliminary result Updated: 08/25/22 1246    Specimen: Blood from Portacath      Culture No growth after 1 day    Blood Culture  Arm, Right [[id number]]  (Normal) Collected: 08/24/22 1027    Order Status: Completed Lab Status: Preliminary result Updated: 08/25/22 1246    Specimen: Blood from Arm, Right      Culture No growth after 1 day    Blood Culture Peripheral Blood [[id number]]  (Abnormal)  (Susceptibility) Collected: 08/23/22 0950    Order Status: Completed Lab Status: Final result Updated: 08/25/22 0726    Specimen: Peripheral Blood      Culture Positive on the 1st day of incubation      Escherichia coli     Comment: 1 of 2 bottles       Susceptibility       Escherichia coli (1)       Antibiotic Interpretation Sensitivity   Method Status      Ampicillin Resistant >=32 ug/mL FLAQUITO Final     Ampicillin/ Sulbactam Susceptible 8 ug/mL FLAQUITO Final     Piperacillin/Tazobactam Susceptible <=4 ug/mL FLAQUITO Final     Cefoxitin  [*]  Susceptible <=4 ug/mL FLAQUITO Final     Ceftazidime Susceptible <=1 ug/mL FLAQUITO Final     Ceftriaxone Susceptible <=1 ug/mL FLAQUITO Final     Cefepime Susceptible <=1 ug/mL FLAQUITO Final     Extended Spectrum Beta-Lactamase  [*]  ESBL Negative Negative ug/mL FLAQUITO Final     Meropenem Susceptible <=0.25 ug/mL FLAQUITO Final     Amikacin  [*]  Susceptible <=2 ug/mL FLAQUITO Final     Gentamicin Resistant >=16 ug/mL FLAQUITO Final     Tobramycin Intermediate 8 ug/mL FLAQUITO Final     Ciprofloxacin Susceptible <=0.25 ug/mL FLAQUITO Final     Levofloxacin Susceptible <=0.12 ug/mL FLAQUITO Final     Nitrofurantoin  [*]  Susceptible <=16 ug/mL FLAQUITO Final     Trimethoprim/Sulfamethoxazole Susceptible <=1/19 ug/mL FLAQUITO Final              [*]  Suppressed Antibiotic                   Verigene GN Panel [[id number]]  (Abnormal) Collected: 08/23/22 0950    Order Status: Completed Lab Status: Final result Updated: 08/24/22 0148    Specimen: Peripheral Blood      Acinetobacter species Not Detected     Citrobacter species Not Detected     Enterobacter species Not Detected     Proteus species Not Detected     Escherichia coli Detected     Comment: Positive for Escherichia coli by  Verigene multiplex nucleic acid test. Final identification and antimicrobial susceptibility testing will be verified by standard methods. Verigene test will not distinguish E. coli from Shigella species including Shigella dysenteriae, Shigella flexneri, Shigella boydii, and Shigella sonnei. Specimens containing Shigella species or E. coli will be reported as positive for E. coli.        Klebsiella pneumoniae Not Detected     Klebsiella oxytoca Not Detected     Pseudomonas aeruginosa Not Detected     CTX-M Not Detected     KPC Not Detected     NDM Not Detected     VIM Not Detected     IMP Not Detected     OXA Not Detected    Narrative:      Specimen tested with Verigene multiplex, gram-negative blood culture nucleic acid test for the following targets: Acinetobacter species, Citrobacter species, Enterobacter species, Proteus species, Escherichia coli, Klebsiella pneumoniae, Klebsiella oxytoca, Pseudomonas aeruginosa, and the following resistance markers: CTX-M, KPC, NDM, VIM, IMP and OXA.    Blood Culture Line, venous [[id number]]  (Normal) Collected: 08/23/22 0944    Order Status: Completed Lab Status: Preliminary result Updated: 08/25/22 1033    Specimen: Blood from Line, venous      Culture No growth after 2 days    Blood Culture Line, venous [[id number]]  (Normal) Collected: 08/23/22 0942    Order Status: Completed Lab Status: Preliminary result Updated: 08/25/22 1033    Specimen: Blood from Line, venous      Culture No growth after 2 days    Urine Culture [[id number]]  (Abnormal)  (Susceptibility) Collected: 08/22/22 2012    Order Status: Completed Lab Status: Final result Updated: 08/24/22 2053    Specimen: Urine, Midstream      Culture >100,000 CFU/mL Escherichia coli    Susceptibility       Escherichia coli (1)       Antibiotic Interpretation Sensitivity   Method Status      Ampicillin Resistant >=32 ug/mL FLAQUITO Final     Ampicillin/ Sulbactam Susceptible 8 ug/mL FLAQUITO Final     Piperacillin/Tazobactam  Susceptible <=4 ug/mL FLAQUITO Final     Cefazolin Susceptible <=4 ug/mL FLAQUITO Final      Cefazolin FLAQUITO breakpoints are for the treatment of uncomplicated urinary tract infections. For the treatment of systemic infections, please contact the laboratory for additional testing.        Cefoxitin Susceptible <=4 ug/mL FLAQUITO Final     Ceftazidime Susceptible <=1 ug/mL FLAQUITO Final     Ceftriaxone Susceptible <=1 ug/mL FLAQUITO Final     Cefepime Susceptible <=1 ug/mL FLAQUITO Final     Extended Spectrum Beta-Lactamase  [*]  ESBL Negative Negative ug/mL FLAQUITO Final     Meropenem  [*]  Susceptible <=0.25 ug/mL FLAQUITO Final     Amikacin  [*]  Susceptible <=2 ug/mL FLAQUITO Final     Gentamicin Resistant >=16 ug/mL FLAQUITO Final     Tobramycin Intermediate 8 ug/mL FLAQUITO Final     Ciprofloxacin Susceptible <=0.25 ug/mL FLAQUITO Final     Levofloxacin Susceptible <=0.12 ug/mL FLAQUITO Final     Nitrofurantoin Susceptible <=16 ug/mL FLAQUITO Final     Trimethoprim/Sulfamethoxazole Susceptible <=1/19 ug/mL FLAQUITO Final              [*]  Suppressed Antibiotic                           Recent Labs   Lab Test 05/09/22  1027 06/03/22  1151 07/07/22  1549 08/04/22  1156 08/22/22 2012   URINEPH 6.0 6.0 6.0 6.0 6.0   NITRITE Positive* Negative Positive* Positive* Negative   LEUKEST Moderate* Moderate* Moderate* Moderate* Large*   WBCU >100* * >182* >182* >182*             Recent Labs   Lab Test 05/21/20 2000   IFLUA Not Detected   FLUAH1 Not Detected   FLUAH3 Not Detected   IE6355 Not Detected   IFLUB Not Detected   RSVA Not Detected   RSVB Not Detected   PIV1 Not Detected   PIV2 Not Detected   PIV3 Not Detected   HMPV Not Detected       Recent Labs   Lab Test 08/23/20  0340   CDBPCT Negative       Imaging: US Renal Complete    Result Date: 8/24/2022  ULTRASOUND RENAL COMPLETE 8/24/2022 11:36 AM CLINICAL HISTORY: 55 y/o w/ recurrent MDR UTI, would like alternate modality to assess for presence of nephroliths. Bilateral renal US desired..  COMPARISONS: CT 8/23/2022. ORDERING  PROVIDER: LITZY PRECIADO TECHNIQUE: Kidneys evaluated with grayscale and color Doppler ultrasound. Bladder evaluated with grayscale imaging. Cine clips through the kidneys were saved in the patient's record. FINDINGS: RIGHT KIDNEY:      Size: 4.2 x 4.7 x 9.9 cm      Cortical thickness: 1.0 cm      Parenchyma: Normal. No stone, mass, or hydronephrosis. LEFT KIDNEY:      Size: 3.8 x 4.5 x 10 cm      Cortical thickness: 1.1 cm      Parenchyma: Normal. No stone, mass, or hydronephrosis. BLADDER: Partially distended.     IMPRESSION: Normal study. No renal stone, mass, or hydronephrosis demonstrated. FLAVIA SRIVASTAVA MD   SYSTEM ID:  [id number]    CT Abdomen Pelvis w Contrast    Result Date: 8/23/2022  EXAM: CT ABDOMEN PELVIS W CONTRAST, CT CHEST PULMONARY EMBOLISM W CONTRAST LOCATION: [facility] DATE/TIME: 8/23/2022 12:37 AM INDICATION: right flank and lower back pain, hx of gastric CA and whipple COMPARISON: CT of the chest, abdomen, and pelvis 05/17/2022. TECHNIQUE: CT chest pulmonary angiogram and routine CT abdomen pelvis with IV contrast. Arterial phase through the chest and venous phase through the abdomen and pelvis. Multiplanar reformats and MIP reconstructions were performed. Dose reduction techniques were used. CONTRAST: iopamidol (ISOVUE 370) solution 111 mL FINDINGS: ANGIOGRAM CHEST: No pulmonary embolus. No aortic dissection or aneurysm. LUNGS AND PLEURA: Mild infectious or inflammatory nodular infiltrate has developed in the dependent left lower lobe. A few tiny pulmonary nodules are unchanged. MEDIASTINUM/AXILLAE: Right internal jugular venous Port-A-Cath terminates in the lower SVC. CORONARY ARTERY CALCIFICATION: None. HEPATOBILIARY: Postcholecystectomy. Biliary enteric anastomosis. Mild pneumobilia. PANCREAS: Post Whipple procedure. SPLEEN: Normal. ADRENAL GLANDS: Normal. KIDNEYS/BLADDER: Wall thickening of the urinary bladder. Prominent urothelial enhancement  of the right ureter and renal pelvis. Mild asymmetric inflammatory stranding of the right kidney. Subtle striated cortical hypoenhancement of the right kidney. Normal left kidney. BOWEL: Post Whipple procedure. Colonic diverticulosis. No bowel obstruction. Normal appendix. LYMPH NODES: Normal. VASCULATURE: Mild aortoiliac atherosclerosis. No aneurysm. PELVIC ORGANS: Normal. MUSCULOSKELETAL: Degenerative changes of the right shoulder and spine. Post left femoral acetabular arthroplasty and instrumented fusion of the left sacroiliac joint.     IMPRESSION: 1.  Ascending urinary tract infection and pyelonephritis on the right. 2.  Cystitis. 3.  Mild infectious or inflammatory infiltrate of the left lower lobe. 4.  No pulmonary embolus. 5.  Post Whipple procedure.    CT Chest Pulmonary Embolism w Contrast    Result Date: 8/23/2022  EXAM: CT ABDOMEN PELVIS W CONTRAST, CT CHEST PULMONARY EMBOLISM W CONTRAST LOCATION: [facility] DATE/TIME: 8/23/2022 12:37 AM INDICATION: right flank and lower back pain, hx of gastric CA and whipple COMPARISON: CT of the chest, abdomen, and pelvis 05/17/2022. TECHNIQUE: CT chest pulmonary angiogram and routine CT abdomen pelvis with IV contrast. Arterial phase through the chest and venous phase through the abdomen and pelvis. Multiplanar reformats and MIP reconstructions were performed. Dose reduction techniques were used. CONTRAST: iopamidol (ISOVUE 370) solution 111 mL FINDINGS: ANGIOGRAM CHEST: No pulmonary embolus. No aortic dissection or aneurysm. LUNGS AND PLEURA: Mild infectious or inflammatory nodular infiltrate has developed in the dependent left lower lobe. A few tiny pulmonary nodules are unchanged. MEDIASTINUM/AXILLAE: Right internal jugular venous Port-A-Cath terminates in the lower SVC. CORONARY ARTERY CALCIFICATION: None. HEPATOBILIARY: Postcholecystectomy. Biliary enteric anastomosis. Mild pneumobilia. PANCREAS: Post Whipple  procedure. SPLEEN: Normal. ADRENAL GLANDS: Normal. KIDNEYS/BLADDER: Wall thickening of the urinary bladder. Prominent urothelial enhancement of the right ureter and renal pelvis. Mild asymmetric inflammatory stranding of the right kidney. Subtle striated cortical hypoenhancement of the right kidney. Normal left kidney. BOWEL: Post Whipple procedure. Colonic diverticulosis. No bowel obstruction. Normal appendix. LYMPH NODES: Normal. VASCULATURE: Mild aortoiliac atherosclerosis. No aneurysm. PELVIC ORGANS: Normal. MUSCULOSKELETAL: Degenerative changes of the right shoulder and spine. Post left femoral acetabular arthroplasty and instrumented fusion of the left sacroiliac joint.     IMPRESSION: 1.  Ascending urinary tract infection and pyelonephritis on the right. 2.  Cystitis. 3.  Mild infectious or inflammatory infiltrate of the left lower lobe. 4.  No pulmonary embolus. 5.  Post Whipple procedure.

## 2022-08-25 NOTE — PROGRESS NOTES
[facility]    Progress Note - [facility] Medicine Service       Date of Admission:  8/22/2022    Main Plans for Today  - Discontinue meropenem 1g q8hrs IV & downgrade to Ceftriaxone 2gms every day till the 2nd Bl culture comes back negative.  - Blood culture today  - Sensitivity result is back & Pt will be transitioned to oral Antibiotocs (Levofloxacin 750 mg every day) after the 2nd Bl culture comes back negative  - Pain control with the same regimen: oxycodone 5-10 mg Q4hr PRN + IV Dilaudid 0.4mg Q6hr PRN for breakthru    Assessment & Plan   Nathalie Bashir is a 56 year old F with PMHx of gastric adenocarcinoma s/p chemotherapy (last chemo on 8/23/2020) , s/p Whipple, gastric bypass, and partial gastrectomy in 2020 for pancreatic mass and found to have gastric cancer on the pathology, in surveillance, chemotherapy-induced neuropathy, chronic pain on chronic opioids, migraines, depression, GERD, chronic constipation, recurrent MDR UTIs, who presented with R sided chest pain, abdominal pain and flank pain was admitted on 8/22/22 for pyelonephritis.     # Pyelonephritis  # History of recurrent UTIs  # History of multi-drug resistant organisms  # Back pain  Presenting with R flank pain, dysuria, intermittent hematuria, UA grossly + with imaging consistent with pyelo. Afebrile. + leukocytosis.  Not meeting SIRS criteria. Patient with recurrent UTI since 4/2022, s/p treatment with most recent UCx 8/4/22 grew ESBL E coli. Follows with urology outpatient for recurrent UTIs. Has cystoscopy 4/28/22 which was without abnormality. No concern for retention, no concern for stones on prior imaging. Patient reported having mild R flank pain with prior UTIs, so possibly had pyelonephritis previously that was not adequately treated leading to recurrent infections. Initially started on Zosyn in ED, switched to meropenem by [facility] admitting provider. Kidney U/S (8/24/2022)  shows: Normal study. No renal stone, mass, or hydronephrosis demonstrated.   - Switch Meropenam to Ceftriaxone 2gms today d/t susceptabilies  - Plan to transition to oral Levofloxacin 750mg daily tomorrow pending blood cultures   - Repeat Blood culture  - Zofran and compazine PRN   - Pain management             - PO oxycodone 5-10 mg Q4H PRN             - IV Dilaudid 0.4 mg Q6H PRN for breakthru   - Will need close follow up with urology outpatient      Micro  8/22 - Urine Cx - >100,000 Ecoli  8/23 - COVID - negative  8/23 - Blood Cx #1 - NGTD  8/23 - Blood Cx #2 - NGTD   8/23 - Blood Cx - Positive x1 - Ecoli  8/23 - Verigene Panel - Ecoli   8/24 - Blood Cx #1 - NGTD  8/24 - Blood Cx #2 - NGTD  8/24 - Blood Cx #3 - NGTD  8/25 - Blood Cx #1 - Pending  8/25 - Blood Cx #2 - Pending      Antibiotics  Zosyn (8/23-8/23)  Meropenem (8/23- 8/25/22)  Ceftriaxone 2gms every day (8/25-8/26)     # Chronic pain (hip, neck, neuropathy)   Follows with palliative, neurology, and PM&R outpatient.   - Continue PTA neuropath regimen              - amitriptyline 75mg daily             - duloxetine 60mg daily             - flexeril 10mg TID  - Pain regimen              - PTA regiem is oxycodone 5mg QID PRN, acute regimen as noted in above problem              -.Ice and Heat PRN              - Acetaminophen/Gabapentin/Lyrica/Toradol/Naprozen allergies             - Avoiding NSAIDs d/t h/o gastric bypass      # Pulmonary infiltrates, left lower lobe  # Pulmonary nodules  Patient noted R sided lower chest pain on admission -> CTPE demonstrated only incidental LLL infiltrate.  - Monitor   - Continue incentive spirometry     # Mental health: Depression, anxiety and opioid dependency:  Pt has chronic depression & on Cymbalta 60 mg that works for her symptoms. She is on Hydroxyzine that works for her anxiety symptoms.   - PTA Hydroxyzine 25-50mg QID PRN      # History of gastric cancer, S/p Whipple Procedure   Incidentally found during Whipple  1/2020 for presumed IPMN, s/p neoadjuvant chemotherapy and curative intent surgery 10/2020. Last saw oncology 7/27/22. In surveillance period with plan for repeat CT CAP Q6 months. See more detailed history in Oncology 7/27/22 and PM&R note 6/22/22.  - Outpatient follow up with oncology as previously scheduled  - Consider further risk/benefit discussion of port, as she has had it 2 years+      # Normocytic Anemia, chronic  Hgb 11.1 on admission. Baseline 11-12.   - Trend CBC     # Colonic diverticulosis  Incidentally noted on CT abdomen pelvis. Has been seen on previous CTs. Last colonoscopy 2018 which noted findings for diverticulosis, but no other findings.      # Migraines with aura  - Continue PTA sumatriptan 100 mg PRN      # Chronic constipation  - Continue PTA senna and simethicone     # GERD  - Continue PTA pantoprazole 40 mg daily and famotidine 20 mg BID w/ food     # Seasonal allergies   - PTA flonase daily    # Transaminitis, resolved   Mild elevations in LFTs on admit. Normal T bili. CT abd/pelvis with Postcholecystectomy. Biliary enteric anastomosis. Mild pneumobilia. No acute process. Likely secondary to inflammatory processes.      Diet: Combination Diet Regular Diet Adult; Other - please comment    DVT Prophylaxis: Low Risk/Ambulatory with no VTE prophylaxis indicated  Hernandez Catheter: Not present  Fluids: PO  Central Lines: PRESENT     Cardiac Monitoring: None  Code Status: Full Code      Disposition Plan      Expected Discharge Date: 08/28/2022      Destination: home;home with family  Discharge Comments: Needing IV antibiotics. May transition to orals in the next few days.        The patient's care was discussed with the Attending Physician, Dr. Goss .    Carina Cline  Medical Student  [facility]  [facility]  Securely message with the Vocera Web Console (learn more here)  Text page via [employer] Paging/Directory   Please see signed  "in provider for up to date coverage information    Resident/Fellow Attestation   I, Demetrius Gallegos DO, was present with the medical student who participated in the service and in the documentation of the note.  I have verified the history and personally performed the physical exam and medical decision making.  I agree with the assessment and plan of care as documented in the note and have edited it appropriately.     Demetrius Gallegos DO  PGY2 Family Medicine Resident   [facility]/ [facility]    Department of Family Medicine and [facility]     Date of Service (when I saw the patient): 08/25/22    Clinically Significant Risk Factors Present on Admission               # Overweight: Estimated body mass index is 28.35 kg/m  as calculated from the following:    Height as of this encounter: 1.702 m (5' 7\").    Weight as of this encounter: 82.1 kg (181 lb).        ______________________________________________________________________    Interval History   No acute overnight events. The Pt was sitting on bed, reported that she is feeling too much better today but still have the back pain (6/10) & the pain on her lower Rt ribcage (8/10) that she has for long time ago before her admission. She asked to stay on the same pain regimen as today because it worked for her.     Data reviewed today: I reviewed all medications, new labs and imaging results over the last 24 hours. I personally reviewed the sensitivity test & no images or EKG's today.    Physical Exam   Vital Signs: Temp: 98.8  F (37.1  C) Temp src: Oral BP: 104/64 Pulse: 90   Resp: 18 SpO2: 97 % O2 Device: None (Room air)    Weight: 181 lbs 0 oz  GENERAL: Alert and awake. Answering questions appropriately. NAD. Pleasant and conversational.   HEENT: AT/NC. Anicteric sclera. Mucous membranes moist. Anisocoria.   CARDIOVASCULAR: RRR. S1, S2. No murmurs, rubs, or gallops.   RESPIRATORY: Effort normal on RA. Clear to auscultation " bilaterally, no rales, rhonchi or wheezes, respirations unlabored   GI: Big mid-line Abdominal scar, Abdomen soft, non tender, normoactive bowel sounds present. Mild R CVA tenderness, none on L.   : + R CVAT  EXTREMITIES: No peripheral edema. No calf asymmetry, erythema, or tenderness.   NEUROLOGICAL: CN II-XII grossly intact. Moving all extremities symmetrically.   Sensations on hands and feet is intact B/L  SKIN: Intact. Warm and dry.  No jaundice.    Data   Recent Labs   Lab 08/25/22  0618 08/24/22  1027 08/23/22  0805 08/22/22  1834   WBC 5.6 7.6 11.2* 9.2   HGB 10.6* 10.8* 11.0* 11.1*   MCV 95 95 94 97    138* 154 212    143 135* 137   POTASSIUM 4.5 4.1 4.3 4.5   CHLORIDE 108 112* 101 103   CO2 34* 30 27 28   BUN 7 8 12.0 11.9   CR 0.93 0.91 0.93 0.95   ANIONGAP 1* 1* 7 6*   PETRA 8.4* 8.3* 8.3* 8.4*   GLC 97 91 145* 98   ALBUMIN  --  2.5* 3.2* 3.6   PROTTOTAL  --  5.7* 5.7* 6.1*   BILITOTAL  --  0.5 0.8 0.5   ALKPHOS  --  103 107* 103   ALT  --  35 39* 30   AST  --  20 45* 33   LIPASE  --   --   --  7*     Recent Results (from the past 24 hour(s))   US Renal Complete    Narrative    ULTRASOUND RENAL COMPLETE 8/24/2022 11:36 AM    CLINICAL HISTORY: 57 y/o w/ recurrent MDR UTI, would like alternate  modality to assess for presence of nephroliths. Bilateral renal US  desired..      COMPARISONS: CT 8/23/2022.    ORDERING PROVIDER: LITZY PRECIADO    TECHNIQUE: Kidneys evaluated with grayscale and color Doppler  ultrasound. Bladder evaluated with grayscale imaging. Cine clips  through the kidneys were saved in the patient's record.    FINDINGS:  RIGHT KIDNEY:       Size: 4.2 x 4.7 x 9.9 cm         Cortical thickness: 1.0 cm         Parenchyma: Normal. No stone, mass, or hydronephrosis.    LEFT KIDNEY:       Size: 3.8 x 4.5 x 10 cm         Cortical thickness: 1.1 cm         Parenchyma: Normal. No stone, mass, or hydronephrosis.    BLADDER: Partially distended.      Impression    IMPRESSION: Normal  study. No renal stone, mass, or hydronephrosis  demonstrated.    FLAVIA SRIVASTAVA MD         SYSTEM ID:  [id number]     Medications      amitriptyline  75 mg Oral At Bedtime    cefTRIAXone  2 g Intravenous Q24H    cyanocobalamin  1,000 mcg Oral Daily    DULoxetine  60 mg Oral Daily    famotidine  20 mg Oral BID AC    fluticasone  1 spray Both Nostrils Daily    lidocaine  2 patch Transdermal Q24h    And    lidocaine   Transdermal Q8H PRIYANK    multivitamin w/minerals  1 tablet Oral Daily    pantoprazole  40 mg Oral BID    sodium chloride (PF)  3 mL Intracatheter Q8H      Note Text (......Xxx Chief Complaint.): This diagnosis correlates with the Other (Free Text): Treated Sk’s on the Left clavicle x2 Render Risk Assessment In Note?: no Detail Level: Simple

## 2022-08-25 NOTE — PLAN OF CARE
Pt is A&Ox4 and able to make needs known.  Pain managed with oxycodone and dilaudid prn. Independent in room, voids well and reports LBM on 8/22.  Right port heparin lock.  Pt has baseline n/t in BLE and BUE that was unchanged on shift.  No new concerns noted.  Continue with POC.

## 2022-08-25 NOTE — PLAN OF CARE
Patient is alert and oriented per baseline, and toshia to communicate needs. Pain managed using oxycodone and dilaudid prn. Independent in room. Eating well. Voiding without difficulty. Right port hepin lock. Patient has baseline numbness in LE and UE bilateral. No new concern noted. Will continue with current POC.

## 2022-08-26 VITALS
TEMPERATURE: 98.6 F | HEIGHT: 67 IN | SYSTOLIC BLOOD PRESSURE: 104 MMHG | WEIGHT: 181 LBS | DIASTOLIC BLOOD PRESSURE: 72 MMHG | HEART RATE: 97 BPM | OXYGEN SATURATION: 98 % | BODY MASS INDEX: 28.41 KG/M2 | RESPIRATION RATE: 16 BRPM

## 2022-08-26 LAB
ANION GAP SERPL CALCULATED.3IONS-SCNC: 3 MMOL/L (ref 3–14)
BUN SERPL-MCNC: 7 MG/DL (ref 7–30)
CALCIUM SERPL-MCNC: 9.1 MG/DL (ref 8.5–10.1)
CHLORIDE BLD-SCNC: 110 MMOL/L (ref 94–109)
CO2 SERPL-SCNC: 30 MMOL/L (ref 20–32)
CREAT SERPL-MCNC: 0.91 MG/DL (ref 0.52–1.04)
ERYTHROCYTE [DISTWIDTH] IN BLOOD BY AUTOMATED COUNT: 15 % (ref 10–15)
GFR SERPL CREATININE-BSD FRML MDRD: 74 ML/MIN/1.73M2
GLUCOSE BLD-MCNC: 99 MG/DL (ref 70–99)
HCT VFR BLD AUTO: 33.5 % (ref 35–47)
HGB BLD-MCNC: 10.8 G/DL (ref 11.7–15.7)
MAGNESIUM SERPL-MCNC: 2.6 MG/DL (ref 1.6–2.3)
MCH RBC QN AUTO: 30.4 PG (ref 26.5–33)
MCHC RBC AUTO-ENTMCNC: 32.2 G/DL (ref 31.5–36.5)
MCV RBC AUTO: 94 FL (ref 78–100)
PHOSPHATE SERPL-MCNC: 3.3 MG/DL (ref 2.5–4.5)
PLATELET # BLD AUTO: 175 10E3/UL (ref 150–450)
POTASSIUM BLD-SCNC: 5 MMOL/L (ref 3.4–5.3)
RBC # BLD AUTO: 3.55 10E6/UL (ref 3.8–5.2)
SODIUM SERPL-SCNC: 143 MMOL/L (ref 133–144)
WBC # BLD AUTO: 4.7 10E3/UL (ref 4–11)

## 2022-08-26 PROCEDURE — 36415 COLL VENOUS BLD VENIPUNCTURE: CPT | Performed by: STUDENT IN AN ORGANIZED HEALTH CARE EDUCATION/TRAINING PROGRAM

## 2022-08-26 PROCEDURE — 250N000011 HC RX IP 250 OP 636: Performed by: STUDENT IN AN ORGANIZED HEALTH CARE EDUCATION/TRAINING PROGRAM

## 2022-08-26 PROCEDURE — 99238 HOSP IP/OBS DSCHRG MGMT 30/<: CPT | Mod: GC | Performed by: FAMILY MEDICINE

## 2022-08-26 PROCEDURE — 85027 COMPLETE CBC AUTOMATED: CPT | Performed by: INTERNAL MEDICINE

## 2022-08-26 PROCEDURE — 83735 ASSAY OF MAGNESIUM: CPT | Performed by: STUDENT IN AN ORGANIZED HEALTH CARE EDUCATION/TRAINING PROGRAM

## 2022-08-26 PROCEDURE — 250N000013 HC RX MED GY IP 250 OP 250 PS 637: Performed by: STUDENT IN AN ORGANIZED HEALTH CARE EDUCATION/TRAINING PROGRAM

## 2022-08-26 PROCEDURE — 82310 ASSAY OF CALCIUM: CPT | Performed by: INTERNAL MEDICINE

## 2022-08-26 PROCEDURE — 82374 ASSAY BLOOD CARBON DIOXIDE: CPT | Performed by: INTERNAL MEDICINE

## 2022-08-26 PROCEDURE — 250N000013 HC RX MED GY IP 250 OP 250 PS 637: Performed by: PHYSICIAN ASSISTANT

## 2022-08-26 PROCEDURE — 84100 ASSAY OF PHOSPHORUS: CPT | Performed by: STUDENT IN AN ORGANIZED HEALTH CARE EDUCATION/TRAINING PROGRAM

## 2022-08-26 RX ORDER — OXYCODONE HYDROCHLORIDE 5 MG/1
TABLET ORAL
Qty: 12 TABLET | Refills: 0 | Status: SHIPPED | OUTPATIENT
Start: 2022-08-26 | End: 2022-08-28

## 2022-08-26 RX ORDER — LIDOCAINE 4 G/G
1 PATCH TOPICAL EVERY 24 HOURS
Qty: 14 PATCH | Refills: 0 | Status: SHIPPED | OUTPATIENT
Start: 2022-08-26 | End: 2022-09-09

## 2022-08-26 RX ORDER — HEPARIN SODIUM,PORCINE 10 UNIT/ML
3 VIAL (ML) INTRAVENOUS
Status: DISCONTINUED | OUTPATIENT
Start: 2022-08-26 | End: 2022-08-26 | Stop reason: HOSPADM

## 2022-08-26 RX ORDER — CEFTRIAXONE 2 G/1
2 INJECTION, POWDER, FOR SOLUTION INTRAMUSCULAR; INTRAVENOUS EVERY 24 HOURS
Status: DISCONTINUED | OUTPATIENT
Start: 2022-08-26 | End: 2022-08-26 | Stop reason: HOSPADM

## 2022-08-26 RX ORDER — LEVOFLOXACIN 750 MG/1
750 TABLET, FILM COATED ORAL DAILY
Qty: 5 TABLET | Refills: 0 | Status: SHIPPED | OUTPATIENT
Start: 2022-08-27 | End: 2022-09-01

## 2022-08-26 RX ADMIN — CYANOCOBALAMIN TAB 1000 MCG 1000 MCG: 1000 TAB at 09:49

## 2022-08-26 RX ADMIN — OXYCODONE HYDROCHLORIDE 10 MG: 5 TABLET ORAL at 10:08

## 2022-08-26 RX ADMIN — SENNOSIDES AND DOCUSATE SODIUM 1 TABLET: 8.6; 5 TABLET ORAL at 05:58

## 2022-08-26 RX ADMIN — FLUTICASONE PROPIONATE 1 SPRAY: 50 SPRAY, METERED NASAL at 11:00

## 2022-08-26 RX ADMIN — CYCLOBENZAPRINE 10 MG: 10 TABLET, FILM COATED ORAL at 00:57

## 2022-08-26 RX ADMIN — OXYCODONE HYDROCHLORIDE 10 MG: 5 TABLET ORAL at 05:59

## 2022-08-26 RX ADMIN — CEFTRIAXONE SODIUM 2 G: 2 INJECTION, POWDER, FOR SOLUTION INTRAMUSCULAR; INTRAVENOUS at 10:09

## 2022-08-26 RX ADMIN — Medication 3 ML: at 14:47

## 2022-08-26 RX ADMIN — HYDROXYZINE HYDROCHLORIDE 50 MG: 25 TABLET, FILM COATED ORAL at 10:08

## 2022-08-26 RX ADMIN — DICLOFENAC SODIUM 4 G: 10 GEL TOPICAL at 09:50

## 2022-08-26 RX ADMIN — FAMOTIDINE 20 MG: 20 TABLET ORAL at 06:55

## 2022-08-26 RX ADMIN — DULOXETINE 60 MG: 60 CAPSULE, DELAYED RELEASE ORAL at 09:49

## 2022-08-26 RX ADMIN — PANTOPRAZOLE SODIUM 40 MG: 40 TABLET, DELAYED RELEASE ORAL at 09:49

## 2022-08-26 RX ADMIN — Medication 1 TABLET: at 09:49

## 2022-08-26 ASSESSMENT — ACTIVITIES OF DAILY LIVING (ADL)
ADLS_ACUITY_SCORE: 18

## 2022-08-26 NOTE — PROGRESS NOTES
Care Management Discharge Note    Discharge Date: 08/26/2022       Discharge Disposition: home     Discharge Services:  None    Discharge DME:  None    Discharge Transportation: spouse    Private pay costs discussed: Not applicable      Education Provided on the Discharge Plan:  yes  Persons Notified of Discharge Plans:patient  Patient/Family in Agreement with the Plan:  yes    Handoff Referral Completed: Yes    Additional Information:  Patient discharged to home on oral ABX spouse provided transportation home  IMM signed and placed in chart        Nadeen LAWSONN RN CCM  RN Care Coordinator 8A and 10 ICU  [address] [address]  [email or web address]   Office: (10 ICU) [phone]   Pager: [phone]    For Weekend & Holiday on call RN Care Coordinator:  (Tasks: Home care, home infusion, medical equipment/oxygen, transportation, IMM & OWEN forms, etc.)   [place] [facility] ([id number]) Saturday & Sunday; (0800-1630)  Recognized Holidays  Pager #1: [phone] Units: 4A, 4C, 4E, 5A & 5B   Pager #2: [phone] Units: 6A, 6B, 6C, 6D  Pager #3: [phone] Units: 7A, 7B, 7C, 7D & 5C   Pager #4: [phone] Units: 5 Ortho, 8A, 10 ICU, & [facility]      For Weekend & Holiday on call Social Work:  (Tasks: TCU, transportation, Hospice, adjustment to illness counseling, Health Care Directives, Child Protection and Domestic Violence concerns, Vulnerable Adult, IMM forms, etc.)   [place] (0800 - 1630) Saturday and Sunday  Pager: [phone] Units: 4A, 4C, 4E, 5A and 5B   Pager: [phone] Units: 6A, 6B, 6C, 6D   Pager: [phone]: 7A, 7B, 7C, 7D, and 5C      [facility] ([id number]) Saturday and Sunday  Units: 5 Ortho, 8A, and 10 ICU   Pager: [phone]

## 2022-08-26 NOTE — DISCHARGE SUMMARY
[facility]  Discharge Summary - Medicine & Pediatrics       Date of Admission:  8/22/2022  Date of Discharge:  8/26/2022  Discharging Provider: Dr. Marimar Goss  Discharge Service: [facility] Medicine Service    Discharge Diagnoses    # Pyelonephritis  # Back pain (Improved) and other Chronic pain (hip, neck, neuropathy)   # Pulmonary infiltrates, left lower lobe  # Pulmonary nodules  # Mental health: Depression, anxiety and opioid dependency:  # History of gastric cancer, S/p Whipple Procedure   # Normocytic Anemia, chronic  # Colonic diverticulosis  # Migraines with aura  # Chronic constipation  # GERD  # Seasonal allergies   # Transaminitis, resolved      Follow-ups Needed After Discharge   =  Follow-up with PCP within a week  = PT advised to keep her appointment with urologist On September 7, 2022  = Pt advised to schedule an appointment with her oncologist, Dr. Eagle to discuss PORT removal.      Unresulted Labs Ordered in the Past 30 Days of this Admission     Date and Time Order Name Status Description    8/25/2022  9:18 AM Blood Culture Arm, Left Preliminary     8/25/2022  9:18 AM Blood Culture Arm, Right Preliminary     8/24/2022  4:21 PM Blood Culture Hand, Right Preliminary     8/24/2022  4:21 PM Blood Culture Hand, Right Preliminary     8/24/2022 12:43 AM Blood Culture Portacath Preliminary     8/24/2022 12:43 AM Blood Culture Arm, Right Preliminary     8/23/2022  9:06 AM Blood Culture Line, venous Preliminary     8/23/2022  9:06 AM Blood Culture Line, venous Preliminary       These results will be followed up by the PCP    Discharge Disposition   Discharged to home  Condition at discharge: Stable    Hospital Course   Nathalie Bashir was admitted on 8/22/2022 for Pyelonephritis.  The following problems were addressed during her hospitalization:    E coli pyelonephritis (right)  E coli bacteremia   Patient presented with right flank pain, dysuria, and  intermittent hematuria and was found to have E coli bacteremia and right-sided pyelonephritis. Notably, she has a history of recurrent UTI with multi-drug resistant organisms, so she was iniitatited on meropenem. Then transitioned to ceftriaxone once culture had speciated and susceptibilities were available. Showed clinical improvement throughout. Was discharged home on levofloxacin (per recommendations of Antimocribial Stewardship Team) to complete a 10 day course of antibiotics.     Did have acute on chronic pain related to pyelo warranting temporary increase of home opioid regimen (see discharge orders)-- enough for 2 days with plan to then be back on PTA regimen.     H/o recurrent UTIs  Recurrent UTIs since April 2022, following w/ urology. Most recently w/ ESBL E coli 8/4/22. Recent renal utrasound and cystoscopy have been normal.   - Follow-up with urology as scheduled     Transaminitis, resolved   Mild elevations in LFTs on admit.    Chronic concerns:   Stable, no new interventions:   - Chronic pain (hip, neck, neuropathy): Follows with palliative, neurology, and PM&R outpatient. Continue PTA neuropathy regimen   - Depression: Cymbalta 60mg daily  - Anxiety: Hydroxyzine 25-50mg QID PRN  - Normocytic anemia: At baseline.   - Migranes w/ aura: PTA sumatriptan 100mg PRN   - Back pain (Improved) and other Chronic pain (hip, neck, neuropathy)   - Pulmonary infiltrates, left lower lobe  - Pulmonary nodules: Continue Monitor outpatient  - History of gastric cancer, S/p Whipple Procedure: follow up with oncology as previously scheduled  - Chronic constipation: Continue PTA senna and simethicone  - GERD: Continue PTA pantoprazole 40 mg daily and famotidine 20 mg BID w/ food  - Seasonal allergies: PTA flonase daily      Consultations This Hospital Stay   CARE MANAGEMENT / SOCIAL WORK IP CONSULT    Code Status   Full Code       The patient was discussed with Dr. Ana Paula Cline       I saw the patient and reviewed  the discharge plans with her. I have reviewed the note and edited it as appropriate. Agree with plan of medical student .    Demetrius Gallegos, DO  PGY2 Family Medicine Resident   [facility]    Department of Family Medicine and [facility] MED SURG  [address] [address]  Phone: [phone]  Fax: [phone]  ______________________________________________________________________    Physical Exam   Vital Signs: Temp: 98.6  F (37  C) Temp src: Oral BP: 104/72 Pulse: 97   Resp: 16 SpO2: 98 % O2 Device: None (Room air)    Weight: 181 lbs 0 oz    GENERAL: Alert and awake. Answering questions appropriately. NAD. Pleasant and conversational.   HEENT: AT/NC. Anicteric sclera. Mucous membranes moist. Anisocoria.   CARDIOVASCULAR: RRR. S1, S2. No murmurs, rubs, or gallops.   RESPIRATORY: Effort normal on RA. Clear to auscultation bilaterally, no rales, rhonchi or wheezes, respirations unlabored   GI: Big mid-line Abdominal scar, Abdomen soft, non tender, normoactive bowel sounds present. Mild R CVA tenderness, none on L.   : Mild + R CVAT  EXTREMITIES: No peripheral edema. No calf asymmetry, erythema, or tenderness.   NEUROLOGICAL: CN II-XII grossly intact. Moving all extremities symmetrically.   Sensations on hands and feet is intact B/L  SKIN: Intact. Warm and dry.  No jaundice.     Primary Care Physician   Alban Yuan    Discharge Orders      Follow Up ([facility])    Follow up with your primary / palliative team to check in after your discharge.     Appointments on [place] and/or [facility] (with [facility] or [facility] provider or service). Call [phone] if you haven't heard regarding these appointments within 7 days of discharge.     Reason for your hospital stay    Kidney and bloodstream infection.     Activity    Your activity upon discharge: activity as tolerated     Diet    Follow this diet  upon discharge: Orders Placed This Encounter      Combination Diet Regular Diet Adult; Other - please comment       Significant Results and Procedures   Most Recent 3 CBC's:  Recent Labs   Lab Test 08/26/22  0543 08/25/22  0618 08/24/22  1027   WBC 4.7 5.6 7.6   HGB 10.8* 10.6* 10.8*   MCV 94 95 95    166 138*     Most Recent 2 LFT's:  Recent Labs   Lab Test 08/24/22  1027 08/23/22  0805   AST 20 45*   ALT 35 39*   ALKPHOS 103 107*   BILITOTAL 0.5 0.8     Most Recent ABG:No lab results found.,   Results for orders placed or performed during the hospital encounter of 08/22/22   CT Chest Pulmonary Embolism w Contrast    Narrative    EXAM: CT ABDOMEN PELVIS W CONTRAST, CT CHEST PULMONARY EMBOLISM W CONTRAST  LOCATION: [facility]  DATE/TIME: 8/23/2022 12:37 AM    INDICATION: right flank and lower back pain, hx of gastric CA and whipple  COMPARISON: CT of the chest, abdomen, and pelvis 05/17/2022.  TECHNIQUE: CT chest pulmonary angiogram and routine CT abdomen pelvis with IV contrast. Arterial phase through the chest and venous phase through the abdomen and pelvis. Multiplanar reformats and MIP reconstructions were performed. Dose reduction   techniques were used.   CONTRAST: iopamidol (ISOVUE 370) solution 111 mL    FINDINGS:  ANGIOGRAM CHEST: No pulmonary embolus. No aortic dissection or aneurysm.    LUNGS AND PLEURA: Mild infectious or inflammatory nodular infiltrate has developed in the dependent left lower lobe. A few tiny pulmonary nodules are unchanged.    MEDIASTINUM/AXILLAE: Right internal jugular venous Port-A-Cath terminates in the lower SVC.    CORONARY ARTERY CALCIFICATION: None.    HEPATOBILIARY: Postcholecystectomy. Biliary enteric anastomosis. Mild pneumobilia.    PANCREAS: Post Whipple procedure.    SPLEEN: Normal.    ADRENAL GLANDS: Normal.    KIDNEYS/BLADDER: Wall thickening of the urinary bladder. Prominent urothelial enhancement of the right ureter  and renal pelvis. Mild asymmetric inflammatory stranding of the right kidney. Subtle striated cortical hypoenhancement of the right kidney.   Normal left kidney.    BOWEL: Post Whipple procedure. Colonic diverticulosis. No bowel obstruction. Normal appendix.    LYMPH NODES: Normal.    VASCULATURE: Mild aortoiliac atherosclerosis. No aneurysm.    PELVIC ORGANS: Normal.    MUSCULOSKELETAL: Degenerative changes of the right shoulder and spine. Post left femoral acetabular arthroplasty and instrumented fusion of the left sacroiliac joint.      Impression    IMPRESSION:  1.  Ascending urinary tract infection and pyelonephritis on the right.  2.  Cystitis.  3.  Mild infectious or inflammatory infiltrate of the left lower lobe.  4.  No pulmonary embolus.  5.  Post Whipple procedure.   CT Abdomen Pelvis w Contrast    Narrative    EXAM: CT ABDOMEN PELVIS W CONTRAST, CT CHEST PULMONARY EMBOLISM W CONTRAST  LOCATION: [facility]  DATE/TIME: 8/23/2022 12:37 AM    INDICATION: right flank and lower back pain, hx of gastric CA and whipple  COMPARISON: CT of the chest, abdomen, and pelvis 05/17/2022.  TECHNIQUE: CT chest pulmonary angiogram and routine CT abdomen pelvis with IV contrast. Arterial phase through the chest and venous phase through the abdomen and pelvis. Multiplanar reformats and MIP reconstructions were performed. Dose reduction   techniques were used.   CONTRAST: iopamidol (ISOVUE 370) solution 111 mL    FINDINGS:  ANGIOGRAM CHEST: No pulmonary embolus. No aortic dissection or aneurysm.    LUNGS AND PLEURA: Mild infectious or inflammatory nodular infiltrate has developed in the dependent left lower lobe. A few tiny pulmonary nodules are unchanged.    MEDIASTINUM/AXILLAE: Right internal jugular venous Port-A-Cath terminates in the lower SVC.    CORONARY ARTERY CALCIFICATION: None.    HEPATOBILIARY: Postcholecystectomy. Biliary enteric anastomosis. Mild  pneumobilia.    PANCREAS: Post Whipple procedure.    SPLEEN: Normal.    ADRENAL GLANDS: Normal.    KIDNEYS/BLADDER: Wall thickening of the urinary bladder. Prominent urothelial enhancement of the right ureter and renal pelvis. Mild asymmetric inflammatory stranding of the right kidney. Subtle striated cortical hypoenhancement of the right kidney.   Normal left kidney.    BOWEL: Post Whipple procedure. Colonic diverticulosis. No bowel obstruction. Normal appendix.    LYMPH NODES: Normal.    VASCULATURE: Mild aortoiliac atherosclerosis. No aneurysm.    PELVIC ORGANS: Normal.    MUSCULOSKELETAL: Degenerative changes of the right shoulder and spine. Post left femoral acetabular arthroplasty and instrumented fusion of the left sacroiliac joint.      Impression    IMPRESSION:  1.  Ascending urinary tract infection and pyelonephritis on the right.  2.  Cystitis.  3.  Mild infectious or inflammatory infiltrate of the left lower lobe.  4.  No pulmonary embolus.  5.  Post Whipple procedure.   US Renal Complete    Narrative    ULTRASOUND RENAL COMPLETE 8/24/2022 11:36 AM    CLINICAL HISTORY: 57 y/o w/ recurrent MDR UTI, would like alternate  modality to assess for presence of nephroliths. Bilateral renal US  desired..      COMPARISONS: CT 8/23/2022.    ORDERING PROVIDER: LITZY PRECIADO    TECHNIQUE: Kidneys evaluated with grayscale and color Doppler  ultrasound. Bladder evaluated with grayscale imaging. Cine clips  through the kidneys were saved in the patient's record.    FINDINGS:  RIGHT KIDNEY:       Size: 4.2 x 4.7 x 9.9 cm         Cortical thickness: 1.0 cm         Parenchyma: Normal. No stone, mass, or hydronephrosis.    LEFT KIDNEY:       Size: 3.8 x 4.5 x 10 cm         Cortical thickness: 1.1 cm         Parenchyma: Normal. No stone, mass, or hydronephrosis.    BLADDER: Partially distended.      Impression    IMPRESSION: Normal study. No renal stone, mass, or hydronephrosis  demonstrated.    FLAVIA SRIVASTAVA MD          SYSTEM ID:  [id number]       Discharge Medications   Discharge Medication List as of 8/26/2022  1:01 PM      START taking these medications    Details   levofloxacin (LEVAQUIN) 750 MG tablet Take 1 tablet (750 mg) by mouth daily for 5 doses, Disp-5 tablet, R-0, E-Prescribe      Lidocaine (LIDOCARE) 4 % Patch Place 1 patch onto the skin every 24 hours for 14 days To prevent lidocaine toxicity, patient should be patch free for 12 hrs daily.Disp-14 patch, [id number]      !! oxyCODONE (ROXICODONE) 5 MG tablet Take 2 tablets (10 mg) by mouth every 4 hours as needed for pain for 1 day, THEN 2 tablets (10 mg) every 6 hours as needed for pain. And then back to your home dose of 5mg four times a day., Disp-12 tablet, R-0, E-Prescribe       !! - Potential duplicate medications found. Please discuss with provider.      CONTINUE these medications which have NOT CHANGED    Details   amitriptyline (ELAVIL) 75 MG tablet Take 1 tablet (75 mg) by mouth At Bedtime, Disp-30 tablet, R-3, E-Prescribe      Cyanocobalamin 1000 MCG CAPS Take 1 tablet by mouth daily, Historical      cyclobenzaprine (FLEXERIL) 10 MG tablet Take 1 tablet (10 mg) by mouth 3 times daily as needed for muscle spasms, Disp-90 tablet, R-3, E-Prescribe      DULoxetine (CYMBALTA) 30 MG capsule Take 3 capsules (90 mg) by mouth daily, Disp-90 capsule, R-2, E-Prescribe      famotidine (PEPCID) 20 MG tablet TAKE 1 TABLET (20 MG) BY MOUTH 2 TIMES DAILY AS NEEDED, Disp-30 tablet, R-3, E-Prescribe      fluticasone (FLONASE) 50 MCG/ACT nasal spray Spray 1 spray into both nostrils daily, Historical      hydrOXYzine (VISTARIL) 25 MG capsule Take 25-50 mg by mouth 4 times daily as needed for itching or anxiety, Historical      loperamide (IMODIUM) 2 MG capsule Take 4 mg by mouth 4 times daily as needed for diarrhea, Historical      loratadine-pseudoePHEDrine (CLARITIN-D 24-HOUR)  MG 24 hr tablet Take 1 tablet by mouth daily as needed for allergies Usually takes in  Spring/Fall, Historical      ondansetron (ZOFRAN) 8 MG tablet Take 1 tablet (8 mg) by mouth every 8 hours as needed (Nausea/Vomiting), Disp-90 tablet, R-2, E-Prescribe      !! oxyCODONE (ROXICODONE) 5 MG tablet Take 1 tablet (5 mg) by mouth 4 times daily as needed for moderate to severe pain Maximum 4 pills/day., Disp-120 tablet, R-0, E-PrescribeOk to fill early due to travel. Please call me directly with any issues with insurance. [phone]      pantoprazole (PROTONIX) 40 MG EC tablet Take 1 tablet (40 mg) by mouth daily, Disp-90 tablet, R-1, E-Prescribe      sennosides (SENOKOT) 8.6 MG tablet 1-2 tablets 1-2 times daily as needed., Disp-120 tablet, R-1, E-Prescribe      simethicone 80 MG TABS Take 1 tablet by mouth every 6 hours as needed (bloating, gas, belching), Disp-90 tablet, R-3, E-Prescribe      SUMAtriptan (IMITREX) 100 MG tablet Take 100 mg by mouth at onset of headache for migraine, Historical       !! - Potential duplicate medications found. Please discuss with provider.      STOP taking these medications       multivitamin w/minerals (THERA-VIT-M) tablet Comments:   Reason for Stopping:             Allergies   Allergies   Allergen Reactions     Gluten Meal Palpitations     Augmentin Rash     Patient tolerated Zosyn in 7/2020     Pregabalin      interfered with Cymbalta     Topiramate      Tongue numbness, taste alteration, irritable      Acetaminophen Nausea     Urine retention       Dust Mite Extract      Gabapentin Dizziness and GI Disturbance     Ketorolac Headache     Latex Rash     Methadone Fatigue     Mold      Naprosyn [Naproxen] Dizziness and GI Disturbance     Seasonal Allergies

## 2022-08-26 NOTE — DISCHARGE INSTRUCTIONS
Follow Up  - Make sure to see your medical team within 7 days of discharge so they can check in on you  - You will get one more dose of your IV antibiotic here before you leave. Starting tomorrow on Saturday - take 1 pill of Levoquin daily. You still take this for 5 days total.

## 2022-08-26 NOTE — PLAN OF CARE
A&Ox4  Asking for pain meds earlier at times  IND  In rm  R chest port  Oral oxy q4  IV abx   Lidocaine patches on R side  N/t BLE/BUE

## 2022-08-26 NOTE — PROGRESS NOTES
Pt. discharged at 1500 to home and was accompanied by  and left with personal belongings. Pt. received complete discharge paperwork and medications as filled by discharge pharmacy. Pt. was given times of last dose for all discharge medications in writing on discharge medication sheets. Discharge teaching included medication, pain management, activity restrictions and signs and symptoms of infection. Portal cath de-accessed and hep locked. Pt. to follow up with upcoming appointments. Pt. had no further questions at the time of discharge and no unmet needs were identified.

## 2022-08-26 NOTE — PLAN OF CARE
Pt A&Ox4, independent in room, calm and cooperative.  Pt given flexeril for muscle spasms at 0100. R chest port flushed, oral oxycodone Q4H given at 0200.  Lidocaine patches in place on R flank.  Baseline n/t BLE/BUE.  Pt reported pain level of 7/10 in R flank, PRN oxycodone given.

## 2022-08-26 NOTE — PLAN OF CARE
Patient A&O x4 and able to make her needs known. Denied CP, lightheadedness, dizziness. Base line neuropathy. Denied SOB/GUZMÁN. Pt is drinking well and voiding spontaneously without difficulties. Pain tolerable and taking Oxycodone and Atarax PRN. Incentive spirometer encouraged and done several times. Independent in the room and demonstrates the ability to use call light appropriately. Will discharge home today.Will continue with POC.

## 2022-08-28 LAB
BACTERIA BLD CULT: NO GROWTH
BACTERIA BLD CULT: NO GROWTH

## 2022-08-29 LAB
BACTERIA BLD CULT: NO GROWTH

## 2022-08-30 ENCOUNTER — PATIENT OUTREACH (OUTPATIENT)
Dept: CARE COORDINATION | Facility: CLINIC | Age: 57
End: 2022-08-30

## 2022-08-30 ENCOUNTER — PRE VISIT (OUTPATIENT)
Dept: UROLOGY | Facility: CLINIC | Age: 57
End: 2022-08-30

## 2022-08-30 LAB
BACTERIA BLD CULT: NO GROWTH
BACTERIA BLD CULT: NO GROWTH

## 2022-08-30 NOTE — TELEPHONE ENCOUNTER
Reason for visit: hospital follow up      Relevant information: recent kidney and bladder infection    Records/imaging/labs/orders: all records available    Pt called: no need for a call      Shona Kenny CMA  8/30/2022  8:21 AM

## 2022-08-30 NOTE — PROGRESS NOTES
Connected Care Resource Center Contact  [facility]/Voicemail     Clinical Data: Transitional Care Management Outreach     Outreach attempted x 2.  Left message on patient's voicemail, providing [facility]'s 24/7 scheduling and nurse triage phone number [phone] ([phone]) for questions/concerns and/or to schedule an appt with an [facility] provider, if they do not have a PCP.      Plan:  [facility] will do no further outreaches at this time.       Beth Sharma MA  Connected Care Resource Center, [facility]    *Connected Care Resource Team does NOT follow patient ongoing. Referrals are identified based on internal discharge reports and the outreach is to ensure patient has an understanding of their discharge instructions.

## 2022-09-01 DIAGNOSIS — D49.0 IPMN (INTRADUCTAL PAPILLARY MUCINOUS NEOPLASM): ICD-10-CM

## 2022-09-01 DIAGNOSIS — C16.3 MALIGNANT NEOPLASM OF PYLORIC ANTRUM (H): ICD-10-CM

## 2022-09-01 DIAGNOSIS — R10.13 ABDOMINAL PAIN, EPIGASTRIC: ICD-10-CM

## 2022-09-01 RX ORDER — PANTOPRAZOLE SODIUM 40 MG/1
40 TABLET, DELAYED RELEASE ORAL 2 TIMES DAILY
Qty: 90 TABLET | Refills: 1 | Status: SHIPPED | OUTPATIENT
Start: 2022-09-01 | End: 2022-10-10

## 2022-09-02 DIAGNOSIS — R10.13 ABDOMINAL PAIN, EPIGASTRIC: ICD-10-CM

## 2022-09-02 DIAGNOSIS — C16.3 MALIGNANT NEOPLASM OF PYLORIC ANTRUM (H): ICD-10-CM

## 2022-09-06 RX ORDER — FAMOTIDINE 20 MG/1
20 TABLET, FILM COATED ORAL 2 TIMES DAILY PRN
Qty: 30 TABLET | Refills: 3 | Status: SHIPPED | OUTPATIENT
Start: 2022-09-06 | End: 2022-09-18

## 2022-09-07 ENCOUNTER — VIRTUAL VISIT (OUTPATIENT)
Dept: UROLOGY | Facility: CLINIC | Age: 57
End: 2022-09-07
Payer: MEDICARE

## 2022-09-07 ENCOUNTER — PATIENT OUTREACH (OUTPATIENT)
Dept: ONCOLOGY | Facility: CLINIC | Age: 57
End: 2022-09-07

## 2022-09-07 DIAGNOSIS — N39.0 RECURRENT UTI: Primary | ICD-10-CM

## 2022-09-07 DIAGNOSIS — C16.2 MALIGNANT NEOPLASM OF BODY OF STOMACH (H): ICD-10-CM

## 2022-09-07 PROCEDURE — 99214 OFFICE O/P EST MOD 30 MIN: CPT | Mod: 95 | Performed by: UROLOGY

## 2022-09-07 RX ORDER — ESTRADIOL 0.1 MG/G
1 CREAM VAGINAL
Qty: 42.5 G | Refills: 3 | Status: SHIPPED | OUTPATIENT
Start: 2022-09-07 | End: 2022-09-21

## 2022-09-07 NOTE — PROGRESS NOTES
"[facility]: Cancer Care                                                                                          Received call from patient with concerns regarding imaging she had done while inpatient at [facility]. She would like Dr Pollard to review CTs for changes in pulmonary nodules. RNCC reviewed report, states \"A few tiny pulmonary nodules are unchanged.\" Dr Pollard asked to review.     Placed return call to patient. Reviewed above. Will follow-up with patient after Dr Pollard has review CT.       Bela Patel RN, BSN, OCN   RN Care Coordinator   [facility]    "

## 2022-09-07 NOTE — PROGRESS NOTES
Nathalie is a 56 year old who is being evaluated via a billable video visit.      How would you like to obtain your AVS? MyChart  If the video visit is dropped, the invitation should be resent by: Text to cell phone: [phone]  Will anyone else be joining your video visit? No      Assessment & Plan     Recurrent UTI    - estradiol (ESTRACE) 0.1 MG/GM vaginal cream; Place 1 g vaginally three times a week  - Comprehensive metabolic panel; Future    Malignant neoplasm of body of stomach (H)  Stable    Will add estrogen cream. Recheck CMP as was elevated in hospital.  Consider methenamine if normal    Return in about 3 months (around 12/7/2022) for using a video visit.    17 minutes were spent today on the date of the encounter in reviewing the EMR including reviewing her urinalysis, urine culture and hospital notes, direct patient care including prescription management and ordering labs, coordination of care, and documentation.    Froylan Velez MD MPH  (she/her/hers)   of Urology  [facility]      CC  Patient Care Team:  Alban Yuan MD as PCP - General (Neurology)  Arnol Santiago MD as MD (Orthopedics)  Magaly Pollard MD as MD (Hematology & Oncology)  Myranda Peter MD as Assigned PCP  Magaly Pollard MD as Assigned Cancer Care Provider  Precious Scott MD as Assigned Palliative Care Provider  Vandana Mari, RN as Specialty Care Coordinator (Hematology & Oncology)  Beverly Finn PA-C as Physician Assistant (Gastroenterology)  Wendy Oliveira PA as Physician Assistant (Urology)  Froylan Velez MD as Assigned Surgical Provider  Apurva Dobbs MD as Assigned Neuroscience Provider  FROYLAN VELEZ      Subjective   Nathalie is a 56 year old, presenting for the following health issues:  Video Visit (Wants to know more about recent kidney infection and why she keep getting recurrent UTI. She drinks fluids and has urgency, but  feels she does not urinate much.)      HPI     Taking vitamin C, B12, and cranberry juice    Drinking about 64 oz water daily    Constipation occasional    Was hospitalized in August for E coli pyelonephritis (urine culture 8/22 reviewed, discharge summary 8/26/2022 reviewed)        Objective         Physical Exam   GENERAL: Healthy, alert and no distress  EYES: Eyes grossly normal to inspection.  No discharge or erythema, or obvious scleral/conjunctival abnormalities.  RESP: No audible wheeze, cough, or visible cyanosis.  No visible retractions or increased work of breathing.    SKIN: Visible skin clear. No significant rash, abnormal pigmentation or lesions.  NEURO: Cranial nerves grossly intact.  Mentation and speech appropriate for age.  PSYCH: Mentation appears normal, affect normal/bright, judgement and insight intact, normal speech and appearance well-groomed.    Video-Visit Details    Video Start Time: 3:35 PM    Type of service:  Video Visit    Video End Time:3:47 PM    Originating Location (pt. Location): Home    Distant Location (provider location):  [facility] [place]     Platform used for Video Visit: ROBAUTO

## 2022-09-07 NOTE — PATIENT INSTRUCTIONS
Websites with free information:    [employer] patient website: www.voicesforpfd.org    Total Control Program: www.totalcontrolprogram.com    Supplements to prevent UTI to consider  -Probiotics  -Cranberry (for these products let them know a doctor is recommending them)   Ellura: www.myellura.Bokee   Theracran HP by [employer] [facility] [address]  -d-mannose 2gm daily  -Vitamin C 500-1000mg twice a day    Use vaginal estrogen cream as directed    Return in 3 months with lab work prior    It was a pleasure meeting with you today.  Thank you for allowing me and my team the privilege of caring for you today.  YOU are the reason we are here, and I truly hope we provided you with the excellent service you deserve.  Please let us know if there is anything else we can do for you so that we can be sure you are leaving completely satisfied with your care experience.

## 2022-09-07 NOTE — LETTER
9/7/2022       RE: Nathalie Bashir  [address] [facility] [address] [facility] [address]     Dear Colleague,    Thank you for referring your patient, Nathalie Bashir, to the [facility]OLOG[facility] [place] at [facility]. Please see a copy of my visit note below.    Nathalie is a 56 year old who is being evaluated via a billable video visit.      How would you like to obtain your AVS? MyChart  If the video visit is dropped, the invitation should be resent by: Text to cell phone: [phone]  Will anyone else be joining your video visit? No      Assessment & Plan     Recurrent UTI    - estradiol (ESTRACE) 0.1 MG/GM vaginal cream; Place 1 g vaginally three times a week  - Comprehensive metabolic panel; Future    Malignant neoplasm of body of stomach (H)  Stable    Will add estrogen cream. Recheck CMP as was elevated in hospital.  Consider methenamine if normal    Return in about 3 months (around 12/7/2022) for using a video visit.    17 minutes were spent today on the date of the encounter in reviewing the EMR including reviewing her urinalysis, urine culture and hospital notes, direct patient care including prescription management and ordering labs, coordination of care, and documentation.    Cailin Velez MD MPH  (she/her/hers)   of Urology  [facility]      CC  Patient Care Team:  Alban Yuan MD as PCP - General (Neurology)  Arnol Santiago MD as MD (Orthopedics)  Magaly Pollard MD as MD (Hematology & Oncology)  Myranda Peter MD as Assigned PCP  Magaly Pollard MD as Assigned Cancer Care Provider  Precious Scott MD as Assigned Palliative Care Provider  Vandana Mari, RN as Specialty Care Coordinator (Hematology & Oncology)  Beverly Finn PA-C as Physician Assistant (Gastroenterology)  Wendy Oliveira PA as Physician Assistant (Urology)  Cailin Velez MD as Assigned Surgical  Provider  Apurva Dobbs MD as Assigned Neuroscience Provider  FROYLAN ARTEAGA HILARIO Zelaya is a 56 year old, presenting for the following health issues:  Video Visit (Wants to know more about recent kidney infection and why she keep getting recurrent UTI. She drinks fluids and has urgency, but feels she does not urinate much.)      HPI     Taking vitamin C, B12, and cranberry juice    Drinking about 64 oz water daily    Constipation occasional    Was hospitalized in August for E coli pyelonephritis (urine culture 8/22 reviewed, discharge summary 8/26/2022 reviewed)        Objective         Physical Exam   GENERAL: Healthy, alert and no distress  EYES: Eyes grossly normal to inspection.  No discharge or erythema, or obvious scleral/conjunctival abnormalities.  RESP: No audible wheeze, cough, or visible cyanosis.  No visible retractions or increased work of breathing.    SKIN: Visible skin clear. No significant rash, abnormal pigmentation or lesions.  NEURO: Cranial nerves grossly intact.  Mentation and speech appropriate for age.  PSYCH: Mentation appears normal, affect normal/bright, judgement and insight intact, normal speech and appearance well-groomed.    Video-Visit Details    Video Start Time: 3:35 PM    Type of service:  Video Visit    Video End Time:3:47 PM    Originating Location (pt. Location): Home    Distant Location (provider location):  [facility] [place]     Platform used for Video Visit: [employer]

## 2022-09-12 ENCOUNTER — MYC MEDICAL ADVICE (OUTPATIENT)
Dept: ONCOLOGY | Facility: CLINIC | Age: 57
End: 2022-09-12

## 2022-09-12 DIAGNOSIS — G89.3 CHRONIC PAIN DUE TO NEOPLASM: ICD-10-CM

## 2022-09-12 DIAGNOSIS — M79.2 NEUROPATHIC PAIN: ICD-10-CM

## 2022-09-12 RX ORDER — OXYCODONE HYDROCHLORIDE 5 MG/1
5 TABLET ORAL 4 TIMES DAILY PRN
Qty: 120 TABLET | Refills: 0 | Status: SHIPPED | OUTPATIENT
Start: 2022-09-12 | End: 2022-10-05

## 2022-09-12 NOTE — TELEPHONE ENCOUNTER
Received voicemail from patient requesting refill of oxycodone.     Last refill: 8/16/22 (30 day supply), 8/26/22 (#12 tabs at hospital discharge)  Last office visit: 7/27/22  Scheduled for follow up 11/2/22     Will route request to MD for review.     Reviewed [place]  Report.

## 2022-09-15 ENCOUNTER — ONCOLOGY VISIT (OUTPATIENT)
Dept: ONCOLOGY | Facility: CLINIC | Age: 57
End: 2022-09-15
Attending: INTERNAL MEDICINE
Payer: MEDICARE

## 2022-09-15 VITALS
OXYGEN SATURATION: 99 % | HEART RATE: 84 BPM | DIASTOLIC BLOOD PRESSURE: 68 MMHG | WEIGHT: 178.1 LBS | BODY MASS INDEX: 27.89 KG/M2 | TEMPERATURE: 98.3 F | SYSTOLIC BLOOD PRESSURE: 102 MMHG

## 2022-09-15 DIAGNOSIS — D64.9 ANEMIA, UNSPECIFIED TYPE: ICD-10-CM

## 2022-09-15 DIAGNOSIS — C16.3 MALIGNANT NEOPLASM OF PYLORIC ANTRUM (H): Primary | ICD-10-CM

## 2022-09-15 PROCEDURE — G0463 HOSPITAL OUTPT CLINIC VISIT: HCPCS

## 2022-09-15 PROCEDURE — 99214 OFFICE O/P EST MOD 30 MIN: CPT | Performed by: INTERNAL MEDICINE

## 2022-09-15 ASSESSMENT — PAIN SCALES - GENERAL: PAINLEVEL: MODERATE PAIN (5)

## 2022-09-15 NOTE — LETTER
9/15/2022         RE: Nathalie Bashir  [address] [facility] [facility] [address]        Dear Colleague,    Thank you for referring your patient, Nathalie Bashir, to the [facility]. Please see a copy of my visit note below.    Oncology/Hematology Visit Note  Sep 15, 2022    Reason for Visit: Follow up of gastric adenocarcinoma.    History of Present Illness:     Please see previous notes for detail. I have copied and updated from prior notes.    Ms. Bashir is a 56 year old female with a history of gastric adenocarcinoma.  She was being followed for a pancreatic duct dilatation and pancreatic cyst which was noted in November 2018.  Since May 2019 she started noticing some nausea and vomiting and occasional abdominal discomfort.  She had an ultrasound in August 2019 which was essentially unremarkable.  She had a repeat endoscopic ultrasound on 10/29/2019 which showed increase in size of the cyst as well as dilation of the main pancreatic duct.  FNA and fluid analysis showed mucinous epithelium.  She was referred to Dr. 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  Surprisingly there was no pancreatic ductal neoplasm seen but on the resected specimen stomach cancer was noted.  It was poorly differentiated adenocarcinoma with poorly cohesive/signet ring cell type with proximal gastric mucosal and serosal margins being positive.  There was lymphovascular and perineural invasion seen.  22 lymph nodes were sampled and all were benign.  It was a pT4aN0 lesion.  HER-2/christine FISH was not amplified.         EUS on 3/3/2020 without clear evidence of neoplasm endoscopically. Left gastric lymph node fine-needle aspiration was negative for carcinoma. Biopsy from the gastric jejunal anastomosis as well as lesser curvature of the stomach also did not show any malignancy.     She had a PET/CT on 3/13/2020 which showed soft tissue streaky density with increased FDG uptake  adjacent to the pancreaticojejunostomy which could be neoplastic in origin.  There is mild increased FDG uptake in the gastric remnant.  Focal area of soft tissue thickening with fatty streakiness along medial laparotomy with increased FDG uptake which likely is inflammatory. Increased FDG uptake in thoracic esophagus which could be esophagitis.      She started on chemotherapy with 5FU, oxaliplatin, and Taxotere on 3/27/20. She was seen on 4/7/20 for evaluation of fevers. She was treated for possible pneumonia with Levaquin.      She was seen in clinic 4/13/20 for RUQ pain,CT and labs reassuring. She received cycles 2 and 3 of chemotherapy on 4/20/20 and 5/4/20. She was admitted from 5/21-5/23/20 with neutropenic aspiration pneumonia. She received cycle 4 on 5/27/20 with Neulasta support. PET/CT on 6/3/20 showed mild residual uptake at the site of gastrojejunal anastomosis which likely is physiologic.  There is almost resolution of soft tissue nodule next to the pancreaticoduodenal anastomosis without FDG uptake.  There is focal increased uptake in the left posterior acetabulum which could be artifact.     Decreased dose of oxaliplatin due to neuropathy/cold sensitivity on 6/11. Decreased 5FU to 85% with cycle #6 due to mucositis/diarrhea/taste changes. Treatment was held 7/9/20 due to diarrhea, dehydration, tachycardia, rash, and weight loss and she got cycle 7 on 7/15. On 7/20 she presented to the ED with RUQ abdominal pain, and was admitted 7/20-7/29 for acute cholangitis     She received C#8 FLOT on 8/14/2020.    PET/CT on 9/2/2020 did not show evidence of disease.  Small peritoneal nodule was a stable.  This was most consistent with fat necrosis.    CT chest abdomen and pelvis on 9/23/2020 also does not show evidence of malignancy recurrence and the peritoneal nodule in the right upper quadrant is stable.  Before definite surgery she had diagnostic laparoscopy which was negative for evidence of malignancy so  on 10/21/2020 she had exploratory laparotomy with extensive lysis of additions, partial omentectomy, resection of Gastrojejunal Anastomosis w/ En Bloc Subtotal Gastrectomy and modified D2 Lymphadenectomy with David-en-Y Gastrojejunostomy Reconstruction.  Final pathology showed Focal residual poorly differentiated adenocarcinoma (0.4cm) with signet-ring morphology status    post-neoadjuvant chemotherapy and all 9 lymph nodes were benign.  All margins were negative.  It was a pT1bN0 lesion.    Before definite surgery she had diagnostic laparoscopy which was negative for evidence of malignancy so on 10/21/2020 she had exploratory laparotomy with extensive lysis of additions, partial omentectomy, resection of Gastrojejunal Anastomosis w/ En Bloc Subtotal Gastrectomy and modified D2 Lymphadenectomy with David-en-Y Gastrojejunostomy Reconstruction.  Final pathology showed Focal residual poorly differentiated adenocarcinoma (0.4cm) with signet-ring morphology status    post-neoadjuvant chemotherapy and all 9 lymph nodes were benign.  All margins were negative.  It was a [id number] lesion.    She was admitted from 11/4/2020-11/6/2020 for nausea vomiting and left upper quadrant pain and CT scan showed fluid collection in the left upper quadrant, likely hematoma.  Upper GI was negative for leak.  No drainage was recommended by IR.  He was discharged on 7 days of levofloxacin and Flagyl.      11/20/2020.  CT abdomen and pelvis showed postsurgical changes of Whipple and subtotal gastrectomy with David-en-Y reconstruction.  There is resolution of previously demonstrated fluid collection between the stomach and the spleen and decrease in sigmoid thickening.  Small left pleural effusion with atelectasis adjacent to it.    12/7/2020.  EGD showed healthy-appearing and generous pouch (remaining the stomach) with widely patent end to side gastrojejunostomy, though with the David/enteral limb mildly stenostic and tortuous at its origin.  Therefore a covered metal stent was placed from stomach to jejunum across the stenotic region, resulting in straightening of the course.    2/8/2021.  EGD was done and stent was removed.    5/21/2021.  CT chest abdomen and pelvis does not show evidence of recurrence of malignancy.  11/15/2021-CT chest abdomen and pelvis without evidence of recurrence.  5/17/2022.  CT chest abdomen and pelvis without evidence of recurrence.      Interval History:  She was admitted to the hospital from 8/22/2020 - 8/26/2022 for right-sided pyelonephritis and E. coli bacteremia.  I reviewed the discharge summary.    She was started on meropenem and then ceftriaxone and discharged on levofloxacin.    Now doing better.  She still has some pain in the right flank.  No fevers.  Urination is better.  She is done with antibiotics.  Sometimes she gets pain in the left side of the abdomen.  Denies nausea or vomiting.  Swallowing is good.  Bowel movements are fine.  She still has neuropathy in her hands and feet and she takes Cymbalta.  She also takes oxycodone 2-4 times a day.  She is following with palliative care.  No new swellings.  Denies any respiratory complaints.    ECOG 1    ROS:  Otherwise a comprehensive review of the system was unremarkable.    I reviewed other history in epic as below.      Current Outpatient Medications   Medication Sig Dispense Refill     amitriptyline (ELAVIL) 75 MG tablet Take 1 tablet (75 mg) by mouth At Bedtime 30 tablet 3     Cyanocobalamin 1000 MCG CAPS Take 1 tablet by mouth daily       cyclobenzaprine (FLEXERIL) 10 MG tablet Take 1 tablet (10 mg) by mouth 3 times daily as needed for muscle spasms 90 tablet 3     DULoxetine (CYMBALTA) 30 MG capsule Take 3 capsules (90 mg) by mouth daily (Patient taking differently: Take 60 mg by mouth daily) 90 capsule 2     estradiol (ESTRACE) 0.1 MG/GM vaginal cream Place 1 g vaginally three times a week 42.5 g 3     famotidine (PEPCID) 20 MG tablet TAKE 1 TABLET (20 MG)  BY MOUTH 2 TIMES DAILY AS NEEDED 30 tablet 3     fluticasone (FLONASE) 50 MCG/ACT nasal spray Spray 1 spray into both nostrils daily       hydrOXYzine (VISTARIL) 25 MG capsule Take 25-50 mg by mouth 4 times daily as needed for itching or anxiety       loperamide (IMODIUM) 2 MG capsule Take 4 mg by mouth 4 times daily as needed for diarrhea       loratadine-pseudoePHEDrine (CLARITIN-D 24-HOUR)  MG 24 hr tablet Take 1 tablet by mouth daily as needed for allergies Usually takes in Spring/Fall       ondansetron (ZOFRAN) 8 MG tablet Take 1 tablet (8 mg) by mouth every 8 hours as needed (Nausea/Vomiting) 90 tablet 2     oxyCODONE (ROXICODONE) 5 MG tablet Take 1 tablet (5 mg) by mouth 4 times daily as needed for moderate to severe pain Maximum 4 pills/day. 120 tablet 0     pantoprazole (PROTONIX) 40 MG EC tablet Take 1 tablet (40 mg) by mouth 2 times daily 90 tablet 1     sennosides (SENOKOT) 8.6 MG tablet 1-2 tablets 1-2 times daily as needed. 120 tablet 1     simethicone 80 MG TABS Take 1 tablet by mouth every 6 hours as needed (bloating, gas, belching) 90 tablet 3     SUMAtriptan (IMITREX) 100 MG tablet Take 100 mg by mouth at onset of headache for migraine         Physical Examination:    [facility] 09/23/2014   Wt Readings from Last 10 Encounters:   08/22/22 82.1 kg (181 lb)   06/27/22 77.8 kg (171 lb 8.3 oz)   05/19/22 77.8 kg (171 lb 9.6 oz)   04/28/22 77.1 kg (170 lb)   03/15/22 80.6 kg (177 lb 9.6 oz)   11/27/21 70.8 kg (156 lb)   03/22/21 71 kg (156 lb 8 oz)   02/08/21 64.7 kg (142 lb 9.6 oz)   02/04/21 66.2 kg (146 lb)   12/07/20 64.6 kg (142 lb 8 oz)       CONSTITUTIONAL: No apparent distress  EYES: PERRLA, without pallor or jaundice  ENT/MOUTH: Ears unremarkable. No oral lesions  CVS: s1s2 normal  RESPIRATORY: Chest is clear  GI: Abdomen is soft and nontender.  Surgical scars are well-healed.  She has mild right-sided CVA tenderness.  NEURO: Alert and oriented ×3  INTEGUMENT: no concerning skin rashes    LYMPHATIC: no palpable lymphadenopathy  MUSCULOSKELETAL: Unremarkable. No bony tenderness.   EXTREMITIES: no pedal edema  PSYCH: Mentation, mood and affect are appropriate        Laboratory Data:     Reviewed  8/26/2022   CBC shows WBC 4.7.  Hemoglobin 10.8.  Platelets 175.     Chemistry unremarkable.    8/23/2022.  CT chest PE protocol, CT abdomen and pelvis  No pulmonary embolism.  Mild infectious or inflammatory nodular infiltrate in the dependent left lower lobe.  Stable pulmonary nodules.  Postcholecystectomy. Biliary enteric anastomosis. Mild pneumobilia. Post Whipple procedure  Wall thickening of the urinary bladder. Prominent urothelial enhancement of the right ureter and renal pelvis. Mild asymmetric inflammatory stranding of the right kidney. Subtle striated cortical hypoenhancement of the right kidney.   Normal left kidney.  Normal lymph nodes.      2/8/2021.  EGD showed David en Y anatomy with generous pouch and stent bridging the GJ and proximal David   The stent was removed and a relook endoscopies showed widely patent prioximal David and associated superficial ulceration          Assessment and Plan:    Gastric adenocarcinona, HER-2 negative.   - Started on neoadjuvant FLOT on 3/7/2020. She has required a dose reduction of her oxaliplatin by 30% due to cold sensitivity/neuropathy with cycle #5. 5FU was decreased to 85% with cycle #6 due to mucositis/diarrhea/taste changes.   Received C#8 on 8/14/2020.  PET/CT on 9/2/2020 did not show evidence of disease.  Small peritoneal nodule was stable.  This was most consistent with fat necrosis.    CT chest abdomen and pelvis on 9/23/2020 also does not show evidence of malignancy recurrence and the peritoneal nodule in the right upper quadrant is stable.    Before definite surgery she had diagnostic laparoscopy which was negative for evidence of malignancy so on 10/21/2020 she had exploratory laparotomy with extensive lysis of additions, partial omentectomy,  resection of Gastrojejunal Anastomosis w/ En Bloc Subtotal Gastrectomy and modified D2 Lymphadenectomy with David-en-Y Gastrojejunostomy Reconstruction.  Final pathology showed Focal residual poorly differentiated adenocarcinoma (0.4cm) with signet-ring morphology status    post-neoadjuvant chemotherapy and all 9 lymph nodes were benign.  All margins were negative.  It was a [id number] lesion.    We decided to keep her on observation as she had received 6 months of neoadjuvant chemotherapy and then the definite surgery.     From the cancer aspect she is doing well and currently there is no evidence of recurrence.  She will have repeat CT chest abdomen and pelvis on 11/28/2022.  We will do EGD on an as-needed basis.    Left lower lobe lung infiltrate.  This looks infectious/inflammatory.  Does not have a typical appearance of metastasis.  She received antibiotics for pyelonephritis.  Currently asymptomatic.  Plan to repeat CT scan in November.        Anemia.   She had iron deficiency anemia and she received INFeD on 10/16/2020.  Anemia resolved but she became anemic again.  Iron studies were unremarkable in May 2022.  B12 and folate were normal.  Erythropoietin was inappropriately low.    Recently she was admitted for pyelonephritis.  Hemoglobin is 10.8.  Continue to observe and will repeat iron studies in a couple of months.     Pyelonephritis.  E. coli bacteremia.  Repeat blood cultures were negative.  Now off antibiotics.  Initially there was a consideration to remove the Port-A-Cath but she improved and blood cultures became negative so port is a still there.  She needs port flushes every 8 weeks or so.    Neuropathy.  She has chemotherapy related neuropathy.  In the past she tried acupuncture but it upset her nerve so she stopped it. She was intolerant to gabapentin and pregabalin. She is taking duloxetine and QID oxycodone. She is following with Palliative care.  We had previously discussed about trying laser  therapy.      We did not address the following today.    Abdominal pain.  This improved after EGD in December 2020 with a covered metal stent was placed from stomach to jejunum across the stenotic region of David/enteral limb.  The stent was removed in February 2021.       Return to clinic in Dec 2022 with labs/CT scan prior.    All questions answered and she is agreeable with the plan.      Magaly Pollard MD

## 2022-09-15 NOTE — NURSING NOTE
"Oncology Rooming Note    September 15, 2022 2:33 PM   Nathalie Bashir is a 56 year old female who presents for:    Chief Complaint   Patient presents with     Oncology Clinic Visit     Gastric cancer     Initial Vitals: /68   Pulse 84   Temp 98.3  F (36.8  C) (Oral)   Wt 80.8 kg (178 lb 1.6 oz)   LMP 09/23/2014   SpO2 99%   BMI 27.89 kg/m   Estimated body mass index is 27.89 kg/m  as calculated from the following:    Height as of 8/22/22: 1.702 m (5' 7\").    Weight as of this encounter: 80.8 kg (178 lb 1.6 oz). Body surface area is 1.95 meters squared.  Moderate Pain (5) Comment: Data Unavailable   Patient's last menstrual period was 09/23/2014.  Allergies reviewed: Yes  Medications reviewed: Yes    Medications: Medication refills not needed today.  Pharmacy name entered into EPIC:    [facility] [place], [place] - [phone]  [facility] [address] [place], [place] - [address] [place] [id number] [place], [place] - [address] [place] [address]    Clinical concerns: none       Starla Cook"

## 2022-09-15 NOTE — PROGRESS NOTES
Oncology/Hematology Visit Note  Sep 15, 2022    Reason for Visit: Follow up of gastric adenocarcinoma.    History of Present Illness:     Please see previous notes for detail. I have copied and updated from prior notes.    Ms. Bashir is a 56 year old female with a history of gastric adenocarcinoma.  She was being followed for a pancreatic duct dilatation and pancreatic cyst which was noted in November 2018.  Since May 2019 she started noticing some nausea and vomiting and occasional abdominal discomfort.  She had an ultrasound in August 2019 which was essentially unremarkable.  She had a repeat endoscopic ultrasound on 10/29/2019 which showed increase in size of the cyst as well as dilation of the main pancreatic duct.  FNA and fluid analysis showed mucinous epithelium.  She was referred to Dr. 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  Surprisingly there was no pancreatic ductal neoplasm seen but on the resected specimen stomach cancer was noted.  It was poorly differentiated adenocarcinoma with poorly cohesive/signet ring cell type with proximal gastric mucosal and serosal margins being positive.  There was lymphovascular and perineural invasion seen.  22 lymph nodes were sampled and all were benign.  It was a pT4aN0 lesion.  HER-2/christine FISH was not amplified.         EUS on 3/3/2020 without clear evidence of neoplasm endoscopically. Left gastric lymph node fine-needle aspiration was negative for carcinoma. Biopsy from the gastric jejunal anastomosis as well as lesser curvature of the stomach also did not show any malignancy.     She had a PET/CT on 3/13/2020 which showed soft tissue streaky density with increased FDG uptake adjacent to the pancreaticojejunostomy which could be neoplastic in origin.  There is mild increased FDG uptake in the gastric remnant.  Focal area of soft tissue thickening with fatty streakiness along medial laparotomy with increased FDG uptake which  likely is inflammatory. Increased FDG uptake in thoracic esophagus which could be esophagitis.      She started on chemotherapy with 5FU, oxaliplatin, and Taxotere on 3/27/20. She was seen on 4/7/20 for evaluation of fevers. She was treated for possible pneumonia with Levaquin.      She was seen in clinic 4/13/20 for RUQ pain,CT and labs reassuring. She received cycles 2 and 3 of chemotherapy on 4/20/20 and 5/4/20. She was admitted from 5/21-5/23/20 with neutropenic aspiration pneumonia. She received cycle 4 on 5/27/20 with Neulasta support. PET/CT on 6/3/20 showed mild residual uptake at the site of gastrojejunal anastomosis which likely is physiologic.  There is almost resolution of soft tissue nodule next to the pancreaticoduodenal anastomosis without FDG uptake.  There is focal increased uptake in the left posterior acetabulum which could be artifact.     Decreased dose of oxaliplatin due to neuropathy/cold sensitivity on 6/11. Decreased 5FU to 85% with cycle #6 due to mucositis/diarrhea/taste changes. Treatment was held 7/9/20 due to diarrhea, dehydration, tachycardia, rash, and weight loss and she got cycle 7 on 7/15. On 7/20 she presented to the ED with RUQ abdominal pain, and was admitted 7/20-7/29 for acute cholangitis     She received C#8 FLOT on 8/14/2020.    PET/CT on 9/2/2020 did not show evidence of disease.  Small peritoneal nodule was a stable.  This was most consistent with fat necrosis.    CT chest abdomen and pelvis on 9/23/2020 also does not show evidence of malignancy recurrence and the peritoneal nodule in the right upper quadrant is stable.  Before definite surgery she had diagnostic laparoscopy which was negative for evidence of malignancy so on 10/21/2020 she had exploratory laparotomy with extensive lysis of additions, partial omentectomy, resection of Gastrojejunal Anastomosis w/ En Bloc Subtotal Gastrectomy and modified D2 Lymphadenectomy with David-en-Y Gastrojejunostomy Reconstruction.   Final pathology showed Focal residual poorly differentiated adenocarcinoma (0.4cm) with signet-ring morphology status    post-neoadjuvant chemotherapy and all 9 lymph nodes were benign.  All margins were negative.  It was a pT1bN0 lesion.    Before definite surgery she had diagnostic laparoscopy which was negative for evidence of malignancy so on 10/21/2020 she had exploratory laparotomy with extensive lysis of additions, partial omentectomy, resection of Gastrojejunal Anastomosis w/ En Bloc Subtotal Gastrectomy and modified D2 Lymphadenectomy with David-en-Y Gastrojejunostomy Reconstruction.  Final pathology showed Focal residual poorly differentiated adenocarcinoma (0.4cm) with signet-ring morphology status    post-neoadjuvant chemotherapy and all 9 lymph nodes were benign.  All margins were negative.  It was a cwF5yF7 lesion.    She was admitted from 11/4/2020-11/6/2020 for nausea vomiting and left upper quadrant pain and CT scan showed fluid collection in the left upper quadrant, likely hematoma.  Upper GI was negative for leak.  No drainage was recommended by IR.  He was discharged on 7 days of levofloxacin and Flagyl.      11/20/2020.  CT abdomen and pelvis showed postsurgical changes of Whipple and subtotal gastrectomy with David-en-Y reconstruction.  There is resolution of previously demonstrated fluid collection between the stomach and the spleen and decrease in sigmoid thickening.  Small left pleural effusion with atelectasis adjacent to it.    12/7/2020.  EGD showed healthy-appearing and generous pouch (remaining the stomach) with widely patent end to side gastrojejunostomy, though with the David/enteral limb mildly stenostic and tortuous at its origin. Therefore a covered metal stent was placed from stomach to jejunum across the stenotic region, resulting in straightening of the course.    2/8/2021.  EGD was done and stent was removed.    5/21/2021.  CT chest abdomen and pelvis does not show evidence of  recurrence of malignancy.  11/15/2021-CT chest abdomen and pelvis without evidence of recurrence.  5/17/2022.  CT chest abdomen and pelvis without evidence of recurrence.      Interval History:  She was admitted to the hospital from 8/22/2020 - 8/26/2022 for right-sided pyelonephritis and E. coli bacteremia.  I reviewed the discharge summary.    She was started on meropenem and then ceftriaxone and discharged on levofloxacin.    Now doing better.  She still has some pain in the right flank.  No fevers.  Urination is better.  She is done with antibiotics.  Sometimes she gets pain in the left side of the abdomen.  Denies nausea or vomiting.  Swallowing is good.  Bowel movements are fine.  She still has neuropathy in her hands and feet and she takes Cymbalta.  She also takes oxycodone 2-4 times a day.  She is following with palliative care.  No new swellings.  Denies any respiratory complaints.    ECOG 1    ROS:  Otherwise a comprehensive review of the system was unremarkable.    I reviewed other history in epic as below.      Current Outpatient Medications   Medication Sig Dispense Refill     amitriptyline (ELAVIL) 75 MG tablet Take 1 tablet (75 mg) by mouth At Bedtime 30 tablet 3     Cyanocobalamin 1000 MCG CAPS Take 1 tablet by mouth daily       cyclobenzaprine (FLEXERIL) 10 MG tablet Take 1 tablet (10 mg) by mouth 3 times daily as needed for muscle spasms 90 tablet 3     DULoxetine (CYMBALTA) 30 MG capsule Take 3 capsules (90 mg) by mouth daily (Patient taking differently: Take 60 mg by mouth daily) 90 capsule 2     estradiol (ESTRACE) 0.1 MG/GM vaginal cream Place 1 g vaginally three times a week 42.5 g 3     famotidine (PEPCID) 20 MG tablet TAKE 1 TABLET (20 MG) BY MOUTH 2 TIMES DAILY AS NEEDED 30 tablet 3     fluticasone (FLONASE) 50 MCG/ACT nasal spray Spray 1 spray into both nostrils daily       hydrOXYzine (VISTARIL) 25 MG capsule Take 25-50 mg by mouth 4 times daily as needed for itching or anxiety        loperamide (IMODIUM) 2 MG capsule Take 4 mg by mouth 4 times daily as needed for diarrhea       loratadine-pseudoePHEDrine (CLARITIN-D 24-HOUR)  MG 24 hr tablet Take 1 tablet by mouth daily as needed for allergies Usually takes in Spring/Fall       ondansetron (ZOFRAN) 8 MG tablet Take 1 tablet (8 mg) by mouth every 8 hours as needed (Nausea/Vomiting) 90 tablet 2     oxyCODONE (ROXICODONE) 5 MG tablet Take 1 tablet (5 mg) by mouth 4 times daily as needed for moderate to severe pain Maximum 4 pills/day. 120 tablet 0     pantoprazole (PROTONIX) 40 MG EC tablet Take 1 tablet (40 mg) by mouth 2 times daily 90 tablet 1     sennosides (SENOKOT) 8.6 MG tablet 1-2 tablets 1-2 times daily as needed. 120 tablet 1     simethicone 80 MG TABS Take 1 tablet by mouth every 6 hours as needed (bloating, gas, belching) 90 tablet 3     SUMAtriptan (IMITREX) 100 MG tablet Take 100 mg by mouth at onset of headache for migraine         Physical Examination:    [facility] 09/23/2014   Wt Readings from Last 10 Encounters:   08/22/22 82.1 kg (181 lb)   06/27/22 77.8 kg (171 lb 8.3 oz)   05/19/22 77.8 kg (171 lb 9.6 oz)   04/28/22 77.1 kg (170 lb)   03/15/22 80.6 kg (177 lb 9.6 oz)   11/27/21 70.8 kg (156 lb)   03/22/21 71 kg (156 lb 8 oz)   02/08/21 64.7 kg (142 lb 9.6 oz)   02/04/21 66.2 kg (146 lb)   12/07/20 64.6 kg (142 lb 8 oz)       CONSTITUTIONAL: No apparent distress  EYES: PERRLA, without pallor or jaundice  ENT/MOUTH: Ears unremarkable. No oral lesions  CVS: s1s2 normal  RESPIRATORY: Chest is clear  GI: Abdomen is soft and nontender.  Surgical scars are well-healed.  She has mild right-sided CVA tenderness.  NEURO: Alert and oriented ×3  INTEGUMENT: no concerning skin rashes   LYMPHATIC: no palpable lymphadenopathy  MUSCULOSKELETAL: Unremarkable. No bony tenderness.   EXTREMITIES: no pedal edema  PSYCH: Mentation, mood and affect are appropriate        Laboratory Data:     Reviewed  8/26/2022   CBC shows WBC 4.7.  Hemoglobin 10.8.   Platelets 175.     Chemistry unremarkable.    8/23/2022.  CT chest PE protocol, CT abdomen and pelvis  No pulmonary embolism.  Mild infectious or inflammatory nodular infiltrate in the dependent left lower lobe.  Stable pulmonary nodules.  Postcholecystectomy. Biliary enteric anastomosis. Mild pneumobilia. Post Whipple procedure  Wall thickening of the urinary bladder. Prominent urothelial enhancement of the right ureter and renal pelvis. Mild asymmetric inflammatory stranding of the right kidney. Subtle striated cortical hypoenhancement of the right kidney.   Normal left kidney.  Normal lymph nodes.      2/8/2021.  EGD showed David en Y anatomy with generous pouch and stent bridging the GJ and proximal David   The stent was removed and a relook endoscopies showed widely patent prioximal David and associated superficial ulceration          Assessment and Plan:    Gastric adenocarcinona, HER-2 negative.   - Started on neoadjuvant FLOT on 3/7/2020. She has required a dose reduction of her oxaliplatin by 30% due to cold sensitivity/neuropathy with cycle #5. 5FU was decreased to 85% with cycle #6 due to mucositis/diarrhea/taste changes.   Received C#8 on 8/14/2020.  PET/CT on 9/2/2020 did not show evidence of disease.  Small peritoneal nodule was stable.  This was most consistent with fat necrosis.    CT chest abdomen and pelvis on 9/23/2020 also does not show evidence of malignancy recurrence and the peritoneal nodule in the right upper quadrant is stable.    Before definite surgery she had diagnostic laparoscopy which was negative for evidence of malignancy so on 10/21/2020 she had exploratory laparotomy with extensive lysis of additions, partial omentectomy, resection of Gastrojejunal Anastomosis w/ En Bloc Subtotal Gastrectomy and modified D2 Lymphadenectomy with David-en-Y Gastrojejunostomy Reconstruction.  Final pathology showed Focal residual poorly differentiated adenocarcinoma (0.4cm) with signet-ring morphology  status    post-neoadjuvant chemotherapy and all 9 lymph nodes were benign.  All margins were negative.  It was a caF6aW1 lesion.    We decided to keep her on observation as she had received 6 months of neoadjuvant chemotherapy and then the definite surgery.     From the cancer aspect she is doing well and currently there is no evidence of recurrence.  She will have repeat CT chest abdomen and pelvis on 11/28/2022.  We will do EGD on an as-needed basis.    Left lower lobe lung infiltrate.  This looks infectious/inflammatory.  Does not have a typical appearance of metastasis.  She received antibiotics for pyelonephritis.  Currently asymptomatic.  Plan to repeat CT scan in November.        Anemia.   She had iron deficiency anemia and she received INFeD on 10/16/2020.  Anemia resolved but she became anemic again.  Iron studies were unremarkable in May 2022.  B12 and folate were normal.  Erythropoietin was inappropriately low.    Recently she was admitted for pyelonephritis.  Hemoglobin is 10.8.  Continue to observe and will repeat iron studies in a couple of months.     Pyelonephritis.  E. coli bacteremia.  Repeat blood cultures were negative.  Now off antibiotics.  Initially there was a consideration to remove the Port-A-Cath but she improved and blood cultures became negative so port is a still there.  She needs port flushes every 8 weeks or so.    Neuropathy.  She has chemotherapy related neuropathy.  In the past she tried acupuncture but it upset her nerve so she stopped it. She was intolerant to gabapentin and pregabalin. She is taking duloxetine and QID oxycodone. She is following with Palliative care.  We had previously discussed about trying laser therapy.      We did not address the following today.    Abdominal pain.  This improved after EGD in December 2020 with a covered metal stent was placed from stomach to jejunum across the stenotic region of David/enteral limb.  The stent was removed in February 2021.        Return to clinic in Dec 2022 with labs/CT scan prior.    All questions answered and she is agreeable with the plan.    Magaly Pollard MD

## 2022-09-16 ENCOUNTER — PATIENT OUTREACH (OUTPATIENT)
Dept: ONCOLOGY | Facility: CLINIC | Age: 57
End: 2022-09-16

## 2022-09-16 DIAGNOSIS — R10.13 ABDOMINAL PAIN, EPIGASTRIC: ICD-10-CM

## 2022-09-16 DIAGNOSIS — C16.3 MALIGNANT NEOPLASM OF PYLORIC ANTRUM (H): ICD-10-CM

## 2022-09-16 NOTE — PROGRESS NOTES
[facility]: Cancer Care Short Note                                                                                          Completed chart audit to assign Oncology Care Coordination enrollment status.      Vandana Mari, RN, BSN  RN Care Coordinator   [facility]

## 2022-09-18 ENCOUNTER — APPOINTMENT (OUTPATIENT)
Dept: GENERAL RADIOLOGY | Facility: CLINIC | Age: 57
End: 2022-09-18
Attending: EMERGENCY MEDICINE
Payer: MEDICARE

## 2022-09-18 ENCOUNTER — APPOINTMENT (OUTPATIENT)
Dept: CT IMAGING | Facility: CLINIC | Age: 57
End: 2022-09-18
Attending: EMERGENCY MEDICINE
Payer: MEDICARE

## 2022-09-18 ENCOUNTER — HOSPITAL ENCOUNTER (EMERGENCY)
Facility: CLINIC | Age: 57
Discharge: HOME | End: 2022-09-18
Attending: INTERNAL MEDICINE | Admitting: INTERNAL MEDICINE
Payer: MEDICARE

## 2022-09-18 VITALS
HEART RATE: 89 BPM | BODY MASS INDEX: 26.98 KG/M2 | WEIGHT: 178 LBS | RESPIRATION RATE: 16 BRPM | DIASTOLIC BLOOD PRESSURE: 75 MMHG | TEMPERATURE: 98 F | SYSTOLIC BLOOD PRESSURE: 120 MMHG | HEIGHT: 68 IN | OXYGEN SATURATION: 98 %

## 2022-09-18 DIAGNOSIS — S80.812A ABRASION OF LEFT LEG: ICD-10-CM

## 2022-09-18 DIAGNOSIS — M54.2 CERVICALGIA: ICD-10-CM

## 2022-09-18 DIAGNOSIS — S80.811A ABRASION OF RIGHT LEG: ICD-10-CM

## 2022-09-18 DIAGNOSIS — S52.502A CLOSED FRACTURE OF DISTAL END OF LEFT RADIUS, UNSPECIFIED FRACTURE MORPHOLOGY, INITIAL ENCOUNTER: ICD-10-CM

## 2022-09-18 DIAGNOSIS — R55 SYNCOPE, UNSPECIFIED SYNCOPE TYPE: ICD-10-CM

## 2022-09-18 DIAGNOSIS — M25.511 RIGHT SHOULDER PAIN: ICD-10-CM

## 2022-09-18 LAB
ALBUMIN SERPL BCG-MCNC: 4 G/DL (ref 3.5–5.2)
ALP SERPL-CCNC: 112 U/L (ref 35–104)
ALT SERPL W P-5'-P-CCNC: 26 U/L (ref 10–35)
ANION GAP SERPL CALCULATED.3IONS-SCNC: 6 MMOL/L (ref 7–15)
AST SERPL W P-5'-P-CCNC: 33 U/L (ref 10–35)
BASOPHILS # BLD AUTO: 0 10E3/UL (ref 0–0.2)
BASOPHILS NFR BLD AUTO: 0 %
BILIRUB SERPL-MCNC: 0.5 MG/DL
BUN SERPL-MCNC: 10.5 MG/DL (ref 6–20)
CALCIUM SERPL-MCNC: 9 MG/DL (ref 8.6–10)
CHLORIDE SERPL-SCNC: 101 MMOL/L (ref 98–107)
CREAT SERPL-MCNC: 0.9 MG/DL (ref 0.51–0.95)
DEPRECATED HCO3 PLAS-SCNC: 32 MMOL/L (ref 22–29)
EOSINOPHIL # BLD AUTO: 0.1 10E3/UL (ref 0–0.7)
EOSINOPHIL NFR BLD AUTO: 1 %
ERYTHROCYTE [DISTWIDTH] IN BLOOD BY AUTOMATED COUNT: 14.8 % (ref 10–15)
GFR SERPL CREATININE-BSD FRML MDRD: 75 ML/MIN/1.73M2
GLUCOSE SERPL-MCNC: 102 MG/DL (ref 70–99)
HCT VFR BLD AUTO: 36.9 % (ref 35–47)
HGB BLD-MCNC: 12.1 G/DL (ref 11.7–15.7)
HOLD SPECIMEN: NORMAL
HOLD SPECIMEN: NORMAL
IMM GRANULOCYTES # BLD: 0 10E3/UL
IMM GRANULOCYTES NFR BLD: 0 %
LYMPHOCYTES # BLD AUTO: 1.3 10E3/UL (ref 0.8–5.3)
LYMPHOCYTES NFR BLD AUTO: 15 %
MCH RBC QN AUTO: 30.9 PG (ref 26.5–33)
MCHC RBC AUTO-ENTMCNC: 32.8 G/DL (ref 31.5–36.5)
MCV RBC AUTO: 94 FL (ref 78–100)
MONOCYTES # BLD AUTO: 0.6 10E3/UL (ref 0–1.3)
MONOCYTES NFR BLD AUTO: 7 %
NEUTROPHILS # BLD AUTO: 6.6 10E3/UL (ref 1.6–8.3)
NEUTROPHILS NFR BLD AUTO: 77 %
NRBC # BLD AUTO: 0 10E3/UL
NRBC BLD AUTO-RTO: 0 /100
PLATELET # BLD AUTO: 229 10E3/UL (ref 150–450)
POTASSIUM SERPL-SCNC: 3.7 MMOL/L (ref 3.4–5.3)
PROT SERPL-MCNC: 6.8 G/DL (ref 6.4–8.3)
RBC # BLD AUTO: 3.92 10E6/UL (ref 3.8–5.2)
SODIUM SERPL-SCNC: 139 MMOL/L (ref 136–145)
TROPONIN T SERPL HS-MCNC: 6 NG/L
WBC # BLD AUTO: 8.8 10E3/UL (ref 4–11)

## 2022-09-18 PROCEDURE — 93005 ELECTROCARDIOGRAM TRACING: CPT

## 2022-09-18 PROCEDURE — 73110 X-RAY EXAM OF WRIST: CPT | Mod: 26 | Performed by: RADIOLOGY

## 2022-09-18 PROCEDURE — 96361 HYDRATE IV INFUSION ADD-ON: CPT

## 2022-09-18 PROCEDURE — 36415 COLL VENOUS BLD VENIPUNCTURE: CPT | Performed by: EMERGENCY MEDICINE

## 2022-09-18 PROCEDURE — 84484 ASSAY OF TROPONIN QUANT: CPT | Performed by: EMERGENCY MEDICINE

## 2022-09-18 PROCEDURE — G1010 CDSM STANSON: HCPCS

## 2022-09-18 PROCEDURE — 250N000011 HC RX IP 250 OP 636: Performed by: EMERGENCY MEDICINE

## 2022-09-18 PROCEDURE — 80053 COMPREHEN METABOLIC PANEL: CPT | Performed by: EMERGENCY MEDICINE

## 2022-09-18 PROCEDURE — 85025 COMPLETE CBC W/AUTO DIFF WBC: CPT | Performed by: EMERGENCY MEDICINE

## 2022-09-18 PROCEDURE — 93010 ELECTROCARDIOGRAM REPORT: CPT | Mod: 59 | Performed by: EMERGENCY MEDICINE

## 2022-09-18 PROCEDURE — 72125 CT NECK SPINE W/O DYE: CPT | Mod: 26 | Performed by: RADIOLOGY

## 2022-09-18 PROCEDURE — 258N000003 HC RX IP 258 OP 636: Performed by: EMERGENCY MEDICINE

## 2022-09-18 PROCEDURE — 96374 THER/PROPH/DIAG INJ IV PUSH: CPT

## 2022-09-18 PROCEDURE — 250N000009 HC RX 250: Performed by: EMERGENCY MEDICINE

## 2022-09-18 PROCEDURE — 73030 X-RAY EXAM OF SHOULDER: CPT | Mod: 26 | Performed by: RADIOLOGY

## 2022-09-18 PROCEDURE — 25605 CLTX DST RDL FX/EPHYS SEP W/: CPT | Mod: 54 | Performed by: EMERGENCY MEDICINE

## 2022-09-18 PROCEDURE — 99285 EMERGENCY DEPT VISIT HI MDM: CPT | Mod: 25

## 2022-09-18 PROCEDURE — 25605 CLTX DST RDL FX/EPHYS SEP W/: CPT | Mod: LT

## 2022-09-18 PROCEDURE — 73110 X-RAY EXAM OF WRIST: CPT | Mod: LT

## 2022-09-18 PROCEDURE — 99285 EMERGENCY DEPT VISIT HI MDM: CPT | Mod: 25 | Performed by: EMERGENCY MEDICINE

## 2022-09-18 PROCEDURE — 999N000065 XR WRIST LEFT G/E 3 VIEWS

## 2022-09-18 PROCEDURE — 73030 X-RAY EXAM OF SHOULDER: CPT | Mod: RT

## 2022-09-18 PROCEDURE — G1010 CDSM STANSON: HCPCS | Mod: GC | Performed by: RADIOLOGY

## 2022-09-18 RX ORDER — LIDOCAINE HYDROCHLORIDE 20 MG/ML
INJECTION, SOLUTION EPIDURAL; INFILTRATION; INTRACAUDAL; PERINEURAL
Status: DISCONTINUED
Start: 2022-09-18 | End: 2022-09-18 | Stop reason: WASHOUT

## 2022-09-18 RX ORDER — FAMOTIDINE 20 MG/1
20 TABLET, FILM COATED ORAL 2 TIMES DAILY PRN
Qty: 30 TABLET | Refills: 3 | Status: SHIPPED | OUTPATIENT
Start: 2022-09-18 | End: 2022-09-28

## 2022-09-18 RX ORDER — LIDOCAINE HYDROCHLORIDE 10 MG/ML
10 INJECTION, SOLUTION EPIDURAL; INFILTRATION; INTRACAUDAL; PERINEURAL ONCE
Status: COMPLETED | OUTPATIENT
Start: 2022-09-18 | End: 2022-09-18

## 2022-09-18 RX ADMIN — LIDOCAINE HYDROCHLORIDE 10 ML: 10 INJECTION, SOLUTION EPIDURAL; INFILTRATION; INTRACAUDAL; PERINEURAL at 13:36

## 2022-09-18 RX ADMIN — HYDROMORPHONE HYDROCHLORIDE 1 MG: 1 INJECTION, SOLUTION INTRAMUSCULAR; INTRAVENOUS; SUBCUTANEOUS at 13:32

## 2022-09-18 RX ADMIN — SODIUM CHLORIDE 500 ML: 9 INJECTION, SOLUTION INTRAVENOUS at 13:31

## 2022-09-18 ASSESSMENT — ACTIVITIES OF DAILY LIVING (ADL)
ADLS_ACUITY_SCORE: 35
ADLS_ACUITY_SCORE: 35

## 2022-09-18 NOTE — DISCHARGE INSTRUCTIONS
Ice and elevation  Splint and sling  Follow-up with orthopedics later this week  Push fluids  Continue home pain regimen.

## 2022-09-18 NOTE — ED TRIAGE NOTES
56 year old female presents with concerns of left wrist, right shoulders, and bilateral knee pain s/p fall on basement stairs at 0930 this morning.  Patient states she had a syncopal episode, but has been experiencing low blood pressure recently.      Triage Assessment     Row Name 09/18/22 1136       Triage Assessment (Adult)    Airway WDL WDL       Respiratory WDL    Respiratory WDL WDL       Skin Circulation/Temperature WDL    Skin Circulation/Temperature WDL WDL       Cardiac WDL    Cardiac WDL X;rhythm    Cardiac Rhythm apical pulse irregular       Peripheral/Neurovascular WDL    Peripheral Neurovascular WDL WDL       Cognitive/Neuro/Behavioral WDL    Cognitive/Neuro/Behavioral WDL WDL       [place] Coma Scale    Best Eye Response 4-->(E4) spontaneous    Best Motor Response 6-->(M6) obeys commands    Best Verbal Response 5-->(V5) oriented    Sujit Coma Scale Score 15

## 2022-09-18 NOTE — ED PROVIDER NOTES
ED Provider Note  [facility]      History     Chief Complaint   Patient presents with     Trauma     HPI  Nathalie Bashir is a 56 year old female who has a complicated history which includes gastric adenocarcinoma.  She presents reporting that she was feeling relatively well with some mild lightheadedness when she was going down the stairs.  The next thing she noted was that she had fallen and had left wrist and right shoulder pain.  She believes that she was out completely.  She believes she hit the right side of her head and has some mild neck discomfort.  She has had lower blood pressures over the past several weeks and she has had medication adjustments.  She is having no fevers or chills.  She has no chest pain or shortness of breath.  She has no abdominal pain.  She has been eating fair.  She has no melena or bright red blood per rectum.  She denies urine symptoms.    Past Medical History  Past Medical History:   Diagnosis Date     Allergic rhinitis      Cancer (H)     stomach cancer     Chronic pain      Depression      Gastric adenocarcinoma (H)      Lumbago      Migraine      Opioid dependence (H)      Other chronic pain     low back     Sacroiliitis (H)     low back pain     Trochanteric bursitis     leg length discrrepancy, hip pain     Past Surgical History:   Procedure Laterality Date     ARTHROPLASTY HIP Left 2016     BACK SURGERY      L4-5 decompression     CATARACT IOL, RT/LT       CHOLECYSTECTOMY N/A 1/28/2020    Procedure: Cholecystectomy;  Surgeon: Yandel Mallory MD;  Location: [facility] OR     ENDOSCOPIC RETROGRADE CHOLANGIOPANCREATOGRAM N/A 7/27/2020    Procedure: ENDOSCOPIC RETROGRADE CHOLANGIOPANCREATOGRAPHY  **Latex Allergy** with jejunal biopsies;  Surgeon: Jeet Aviles MD;  Location: [facility] OR     ESOPHAGOSCOPY, GASTROSCOPY, DUODENOSCOPY (EGD), COMBINED N/A 3/3/2020    Procedure: ESOPHAGOGASTRODUODENOSCOPY, WITH ENDOSCOPIC US (enoxaparin);  Surgeon: Boni  Bakari Aldana MD;  [facility]     ESOPHAGOSCOPY, GASTROSCOPY, DUODENOSCOPY (EGD), COMBINED N/A 12/7/2020    Procedure: Upper Endoscopy with stent placement;  Surgeon: eJet Aviles MD;  Location:  OR     ESOPHAGOSCOPY, GASTROSCOPY, DUODENOSCOPY (EGD), COMBINED N/A 2/8/2021    Procedure: ESOPHAGOGASTRODUODENOSCOPY (EGD)AND STENT REMOVAL;  Surgeon: Jeet Aviles MD;  Location:  OR     EYE SURGERY       GASTRECTOMY N/A 10/21/2020    Procedure: Open subtotal gastectomy with David en Y Reconstruction; D1 Lymph Node Disection  **Latex Allergy**;  Surgeon: Yandel Mallory MD;  Location: [facility] OR     IR CHEST PORT PLACEMENT > 5 YRS OF AGE  3/26/2020     IR LUMBAR EPIDURAL STEROID INJECTION  8/25/2009     IR LUMBAR EPIDURAL STEROID INJECTION  9/2/2009     IR LUMBAR EPIDURAL STEROID INJECTION  9/25/2009     IR LUMBAR EPIDURAL STEROID INJECTION  11/23/2009     IR LUMBAR EPIDURAL STEROID INJECTION  4/1/2010     IR LUMBAR EPIDURAL STEROID INJECTION  9/1/2010     IR LUMBAR EPIDURAL STEROID INJECTION  3/10/2011     IR LUMBAR EPIDURAL STEROID INJECTION  8/30/2011     LAPAROSCOPY DIAGNOSTIC (GENERAL) N/A 10/13/2020    Procedure: Diagnostic laparoscopy with peritoneal washings  **Latex Allergy**;  Surgeon: Yandel Mallory MD;  [facility]     OPEN REDUCTION INTERNAL FIXATION RODDING INTRAMEDULLARY FEMUR Left 12/23/2014    Procedure: OPEN REDUCTION INTERNAL FIXATION RODDING INTRAMEDULLARY FEMUR;  Surgeon: Arnol Santiago MD;  Location:  OR     ORTHOPEDIC SURGERY       PICC DOUBLE LUMEN PLACEMENT Right 02/07/2020    5 fr double lumen 41 cm     IL LAMINEC/FACETECT/FORAMIN,LUMBAR 1 SEG      Description: Laminectomy Decompress, Facetectomy, Foraminotomy Lumbar Seg;  Recorded: 04/12/2012;     REMOVE HARDWARE LOWER EXTREMITY Left 4/7/2015    Procedure: REMOVE HARDWARE LOWER EXTREMITY;  Surgeon: Arnol Santiago MD;  Location:  OR     REMOVE HARDWARE RODDING INTRAMEDULLARY FEMUR Left 12/17/2015    Procedure:  REMOVE HARDWARE RODDING INTRAMEDULLARY FEMUR;  Surgeon: Arnol Santiago MD;  Location: [facility] OR     RESECT BONE LOWER EXTREMITY Left 12/23/2014    Procedure: RESECT BONE LOWER EXTREMITY;  Surgeon: Arnol Santiago MD;  Location: [facility]     SHORTENING OSTEOPLASTY FEMUR W/ IM NAILING       WHIPPLE PROCEDURE N/A 1/28/2020    Procedure: Open Whipple procedure and Cholecystectomy;  Surgeon: Yandel Mallory MD;  Location: [facility] OR     WHIPPLE PROCEDURE       ZZC FUSION OF SACROILIAC JOINT      Description: Arthrodesis Sacroiliac Joint Left;  Recorded: 10/07/2011;     ZZC REPAIR DETACH RETINA,SCLERAL BUCKLE       amitriptyline (ELAVIL) 75 MG tablet  Cyanocobalamin 1000 MCG CAPS  cyclobenzaprine (FLEXERIL) 10 MG tablet  DULoxetine (CYMBALTA) 30 MG capsule  estradiol (ESTRACE) 0.1 MG/GM vaginal cream  fluticasone (FLONASE) 50 MCG/ACT nasal spray  hydrOXYzine (VISTARIL) 25 MG capsule  loperamide (IMODIUM) 2 MG capsule  loratadine-pseudoePHEDrine (CLARITIN-D 24-HOUR)  MG 24 hr tablet  ondansetron (ZOFRAN) 8 MG tablet  oxyCODONE (ROXICODONE) 5 MG tablet  pantoprazole (PROTONIX) 40 MG EC tablet  sennosides (SENOKOT) 8.6 MG tablet  simethicone 80 MG TABS  SUMAtriptan (IMITREX) 100 MG tablet  famotidine (PEPCID) 20 MG tablet      Allergies   Allergen Reactions     Gluten Meal Palpitations     Augmentin Rash     Patient tolerated Zosyn in 7/2020     Pregabalin      interfered with Cymbalta     Topiramate      Tongue numbness, taste alteration, irritable      Acetaminophen Nausea     Urine retention       Dust Mite Extract      Gabapentin Dizziness and GI Disturbance     Ketorolac Headache     Latex Rash     Methadone Fatigue     Mold      Naprosyn [Naproxen] Dizziness and GI Disturbance     Seasonal Allergies      Family History  Family History   Problem Relation Age of Onset     Heart Failure Mother 77     Dementia Father         frontal lobe     No Known Problems Sister      No Known Problems Brother      Anesthesia Reaction  "No family hx of      Deep Vein Thrombosis No family hx of      Social History   Social History     Tobacco Use     Smoking status: Former Smoker     Packs/day: 0.50     Years: 25.00     Pack years: 12.50     Types: Cigarettes     Quit date: 2020     Years since quittin.7     Smokeless tobacco: Never Used   Substance Use Topics     Alcohol use: No     Drug use: No      Past medical history, past surgical history, medications, allergies, family history, and social history were reviewed with the patient. No additional pertinent items.       Review of Systems  A complete review of systems was performed with pertinent positives and negatives noted in the HPI, and all other systems negative.    Physical Exam   BP: 119/70  Pulse: 94  Temp: 98  F (36.7  C)  Resp: 16  Height: 172.7 cm (5' 8\")  Weight: 80.7 kg (178 lb)  SpO2: 97 %  Physical Exam  Constitutional:       General: She is not in acute distress.     Appearance: She is well-developed.   HENT:      Head: Normocephalic and atraumatic.   Eyes:      Conjunctiva/sclera: Conjunctivae normal.      Pupils: Pupils are equal, round, and reactive to light.   Neck:      Thyroid: No thyromegaly.      Trachea: No tracheal deviation.   Cardiovascular:      Rate and Rhythm: Normal rate and regular rhythm.      Heart sounds: Normal heart sounds. No murmur heard.  Pulmonary:      Effort: Pulmonary effort is normal. No respiratory distress.      Breath sounds: Normal breath sounds. No wheezing.   Chest:      Chest wall: No tenderness.   Abdominal:      General: There is no distension.      Palpations: Abdomen is soft.      Tenderness: There is no abdominal tenderness.   Musculoskeletal:        Arms:       Cervical back: Normal range of motion and neck supple. Tenderness (midline, upper) present.        Legs:    Skin:     General: Skin is warm.      Findings: No rash.   Neurological:      Mental Status: She is alert and oriented to person, place, and time.      Sensory: No " sensory deficit.   Psychiatric:         Behavior: Behavior normal.         ED Course      Procedures    Left radius reduction       Left wrist was cleansed with iodine.  A hematoma block was performed using 8 cc of 1% lidocaine which was infiltrated after aspiration into the swollen area on the dorsal aspect of her wrist.  Finger traps were then used to provide traction.  The distal fragment then was pushed volarly to provide better alignment.  A sugar-tong Ortho-Glass splint was then applied to the left arm and a sling was given.  Patient tolerated the procedure well.  Post splinting, patient had good distal CMS.       An EKG was performed.  It was read by me at 12:52 PM.  It shows a normal sinus rhythm with a sinus arrhythmia.  Rate is 89.  There are no acute changes when compared to an EKG from 8/22/2022.       Results for orders placed or performed during the hospital encounter of 09/18/22   XR Wrist Left G/E 3 Views     Status: None    Narrative    EXAM: XR WRIST LEFT G/E 3 VIEWS  LOCATION: [facility]  DATE/TIME: 9/18/2022 12:10 PM    INDICATION: Pain following fall  COMPARISON: None.      Impression    IMPRESSION: Acute fracture of the distal radial metaphysis with small dorsal fracture fragments. Dorsal displacement and angulation of the distal radius. No ulnar fracture is evident. Degenerative changes in the IP joint of the thumb.   XR Shoulder Right G/E 3 Views     Status: None    Narrative    EXAM: XR SHOULDER RIGHT G/E 3 VIEWS  LOCATION: [facility]  DATE/TIME: 9/18/2022 12:10 PM    INDICATION: Pain following fall.  COMPARISON: None.      Impression    IMPRESSION: No fractures are identified. Normal glenohumeral alignment. Mild degenerative changes in the glenohumeral joint. The acromioclavicular joint is unremarkable. Right-sided port catheter.   CT Cervical Spine w/o Contrast     Status: None    Narrative    CT  CERVICAL SPINE W/O CONTRAST 9/18/2022 12:14 PM    Provided History: neck pain post fall    Comparison: CT chest and pelvis 11/15/2021    Technique: Using multidetector thin collimation helical acquisition  technique, axial, coronal and sagittal CT images through the cervical  spine were obtained without intravenous contrast.     Findings:    The cervical vertebrae are normally aligned. Straightening of the  cervical curvature.     No acute fracture or traumatic subluxation. No prevertebral edema.     There is multilevel disc height narrowing throughout. Multilevel  cervical spondylosis with disc osteophyte complexes, uncinate  spurring, and facet arthropathy. Mild spinal canal narrowing at C4-5  and C5-6. Mild to moderate bilateral neural foraminal narrowing at  C4-5, mild to moderate right and mild left neural stenosis at C5-6.    No abnormality of the paraspinous soft tissues.      Impression    Impression:   1. No acute fracture or traumatic subluxation.  2. Multilevel cervical spondylosis, with minimal spinal canal  narrowing at C4-C5 and C5-C6.    I have personally reviewed the examination and initial interpretation  and I agree with the findings.    BRANDON GAYTAN MD         SYSTEM ID:  [id number]   XR Wrist Left G/E 3 Views     Status: None    Narrative    EXAM: XR WRIST LEFT G/E 3 VIEWS  LOCATION: [facility]  DATE/TIME: 9/18/2022 2:37 PM    INDICATION: post reduction  COMPARISON: Examination performed earlier in the day.      Impression    IMPRESSION: Distal radial metaphyseal fracture. Dorsal angulation has been reduced in the interval. Mild dorsal displacement has improved in the interval. Stable radial displacement. Splint is in place.   Comprehensive metabolic panel     Status: Abnormal   Result Value Ref Range    Sodium 139 136 - 145 mmol/L    Potassium 3.7 3.4 - 5.3 mmol/L    Chloride 101 98 - 107 mmol/L    Carbon Dioxide (CO2) 32 (H) 22 - 29 mmol/L     Anion Gap 6 (L) 7 - 15 mmol/L    Urea Nitrogen 10.5 6.0 - 20.0 mg/dL    Creatinine 0.90 0.51 - 0.95 mg/dL    Calcium 9.0 8.6 - 10.0 mg/dL    Glucose 102 (H) 70 - 99 mg/dL    Alkaline Phosphatase 112 (H) 35 - 104 U/L    AST 33 10 - 35 U/L    ALT 26 10 - 35 U/L    Protein Total 6.8 6.4 - 8.3 g/dL    Albumin 4.0 3.5 - 5.2 g/dL    Bilirubin Total 0.5 <=1.2 mg/dL    GFR Estimate 75 >60 mL/min/1.73m2   Troponin T, High Sensitivity     Status: Normal   Result Value Ref Range    Troponin T, High Sensitivity 6 <=14 ng/L   CBC with platelets and differential     Status: None   Result Value Ref Range    WBC Count 8.8 4.0 - 11.0 10e3/uL    RBC Count 3.92 3.80 - 5.20 10e6/uL    Hemoglobin 12.1 11.7 - 15.7 g/dL    Hematocrit 36.9 35.0 - 47.0 %    MCV 94 78 - 100 fL    MCH 30.9 26.5 - 33.0 pg    MCHC 32.8 31.5 - 36.5 g/dL    RDW 14.8 10.0 - 15.0 %    Platelet Count 229 150 - 450 10e3/uL    % Neutrophils 77 %    % Lymphocytes 15 %    % Monocytes 7 %    % Eosinophils 1 %    % Basophils 0 %    % Immature Granulocytes 0 %    NRBCs per 100 WBC 0 <1 /100    Absolute Neutrophils 6.6 1.6 - 8.3 10e3/uL    Absolute Lymphocytes 1.3 0.8 - 5.3 10e3/uL    Absolute Monocytes 0.6 0.0 - 1.3 10e3/uL    Absolute Eosinophils 0.1 0.0 - 0.7 10e3/uL    Absolute Basophils 0.0 0.0 - 0.2 10e3/uL    Absolute Immature Granulocytes 0.0 <=0.4 10e3/uL    Absolute NRBCs 0.0 10e3/uL   EKG 12-lead, tracing only     Status: None (Preliminary result)   Result Value Ref Range    Systolic Blood Pressure  mmHg    Diastolic Blood Pressure  mmHg    Ventricular Rate 89 BPM    Atrial Rate 89 BPM    IL Interval 142 ms    QRS Duration 76 ms     ms    QTc 474 ms    P Axis 36 degrees    R AXIS 30 degrees    T Axis 24 degrees    Interpretation ECG       Sinus rhythm with sinus arrhythmia  Septal infarct , age undetermined  Abnormal ECG       Medications   lidocaine (PF) (XYLOCAINE) 1 % injection 10 mL (10 mLs Infiltration Given 9/18/22 8459)   HYDROmorphone (DILAUDID)  injection 1 mg (1 mg Intravenous Given 9/18/22 1332)   0.9% sodium chloride BOLUS (0 mLs Intravenous Stopped 9/18/22 1515)        Assessments & Plan (with Medical Decision Making)   Patient is a 56-year-old female with a history of gastric adenocarcinoma and chronic pain who presents after a syncopal episode.  She felt lightheaded walking down the stairs and then fell down.  She thinks she hit her head.  She has had no nausea or vomiting.  She has no fever.  She denies chest pain.  She has no shortness of breath.    On exam, patient's blood pressure is 119/70 with a pulse rate of 94 and temperature of 98.  Respirations are 16 with O2 saturations 97%.  She has mild midline cervical pain.  There is no evidence of head trauma.  She has a left wrist deformity and tenderness.  She also has some right shoulder pain with a bruise posteriorly.  She has abrasions to the proximal anterior tibial regions of both legs.  Both knees are without effusion or instability.    For the syncopal episode, an EKG was performed which showed a normal sinus rhythm and no acute changes when compared to old EKGs.  High-sensitivity troponin was 6.  There is no ectopy on cardiac monitor.    Patient's white count was 8.8 with a hemoglobin of 12.1.  Comprehensive metabolic profile was unremarkable with exception of a bicarb level of 32 and glucose 102.  Alk phos was 112.  Other LFTs were normal.  Kidney function was normal.    Patient was given IV fluids as well as Dilaudid for pain.  She did feel better after interventions.    CT scan of the cervical spine showed no acute fracture or traumatic subluxation.  There is multilevel cervical spondylosis with minimal spinal canal narrowing at C4-C5 and C5-C6.    Right shoulder showed no fractures or dislocation.  There was some degenerative changes.    Left wrist x-ray showed an acute fracture of the distal radial metaphysis with small dorsal fracture fragments and dorsal displacement and angulation of  the distal radius.    I discussed the case with orthopedics.  They felt an attempt at reduction would be helpful.  They recommended a hematoma block.    Hematoma block was performed with good anesthesia.  Finger traps were used and postreduction films showed improved alignment.    Patient be discharged with fracture and splint care as well as syncope instructions.  This very well could be related to blood pressure changes resulting in lightheadedness.  She will return should she have another episode and was instructed to change positions slowly and make sure she is stable before ambulation.  She was also instructed should she develop lightheadedness while she is up that she should try to sit down right away.      I have reviewed the nursing notes. I have reviewed the findings, diagnosis, plan and need for follow up with the patient.    Discharge Medication List as of 9/18/2022  3:16 PM          Final diagnoses:   Closed fracture of distal end of left radius, unspecified fracture morphology, initial encounter   Syncope, unspecified syncope type       --  Jaycob Simental  [facility] EMERGENCY DEPARTMENT  9/18/2022     Jaycob Simental MD  09/19/22 155

## 2022-09-19 ENCOUNTER — OFFICE VISIT (OUTPATIENT)
Dept: ORTHOPEDICS | Facility: CLINIC | Age: 57
End: 2022-09-19
Payer: MEDICARE

## 2022-09-19 ENCOUNTER — PRE VISIT (OUTPATIENT)
Dept: ORTHOPEDICS | Facility: CLINIC | Age: 57
End: 2022-09-19

## 2022-09-19 ENCOUNTER — TELEPHONE (OUTPATIENT)
Dept: ORTHOPEDICS | Facility: CLINIC | Age: 57
End: 2022-09-19

## 2022-09-19 ENCOUNTER — LAB (OUTPATIENT)
Dept: LAB | Facility: CLINIC | Age: 57
End: 2022-09-19

## 2022-09-19 DIAGNOSIS — S52.572A OTHER CLOSED INTRA-ARTICULAR FRACTURE OF DISTAL END OF LEFT RADIUS, INITIAL ENCOUNTER: ICD-10-CM

## 2022-09-19 DIAGNOSIS — S52.572A OTHER CLOSED INTRA-ARTICULAR FRACTURE OF DISTAL END OF LEFT RADIUS, INITIAL ENCOUNTER: Primary | ICD-10-CM

## 2022-09-19 DIAGNOSIS — M25.539 WRIST PAIN: Primary | ICD-10-CM

## 2022-09-19 LAB
ATRIAL RATE - MUSE: 89 BPM
DIASTOLIC BLOOD PRESSURE - MUSE: NORMAL MMHG
INTERPRETATION ECG - MUSE: NORMAL
P AXIS - MUSE: 36 DEGREES
PR INTERVAL - MUSE: 142 MS
QRS DURATION - MUSE: 76 MS
QT - MUSE: 390 MS
QTC - MUSE: 474 MS
R AXIS - MUSE: 30 DEGREES
SYSTOLIC BLOOD PRESSURE - MUSE: NORMAL MMHG
T AXIS - MUSE: 24 DEGREES
VENTRICULAR RATE- MUSE: 89 BPM

## 2022-09-19 PROCEDURE — U0003 INFECTIOUS AGENT DETECTION BY NUCLEIC ACID (DNA OR RNA); SEVERE ACUTE RESPIRATORY SYNDROME CORONAVIRUS 2 (SARS-COV-2) (CORONAVIRUS DISEASE [COVID-19]), AMPLIFIED PROBE TECHNIQUE, MAKING USE OF HIGH THROUGHPUT TECHNOLOGIES AS DESCRIBED BY CMS-2020-01-R: HCPCS | Performed by: STUDENT IN AN ORGANIZED HEALTH CARE EDUCATION/TRAINING PROGRAM

## 2022-09-19 PROCEDURE — 99204 OFFICE O/P NEW MOD 45 MIN: CPT | Performed by: STUDENT IN AN ORGANIZED HEALTH CARE EDUCATION/TRAINING PROGRAM

## 2022-09-19 NOTE — TELEPHONE ENCOUNTER
DIAGNOSIS: Closed Fracture of LT Distal Radius- ER Follow-up   APPOINTMENT DATE: 09/19/2022   NOTES STATUS DETAILS   DISCHARGE REPORT from the ER Internal 09/18/2022 - [facility] ED    MEDICATION LIST Internal    LABS     CBC/DIFF Internal 09/18/2022   XRAYS (IMAGES & REPORTS) Internal 09/18/2022 (x2) - LT Wrist

## 2022-09-19 NOTE — NURSING NOTE
Reason For Visit:   Chief Complaint   Patient presents with     Consult     Left wrist fracture DOI: 9/18/22       Primary MD: Alban Yuan  Ref. MD: ED follow-up     Age: 56 year old    ?  No      LMP 09/23/2014       Pain Assessment  Patient Currently in Pain: Yes  Patient's Stated Pain Goal: 5 (Up to 10/10 before taking pain killers)  Primary Pain Location: Wrist (Left wrist)    Hand Dominance Evaluation  Hand Dominance: Right          QuickDASH Assessment  No flowsheet data found.       Current Outpatient Medications   Medication Sig Dispense Refill     amitriptyline (ELAVIL) 75 MG tablet Take 1 tablet (75 mg) by mouth At Bedtime 30 tablet 3     Cyanocobalamin 1000 MCG CAPS Take 1 tablet by mouth daily       cyclobenzaprine (FLEXERIL) 10 MG tablet Take 1 tablet (10 mg) by mouth 3 times daily as needed for muscle spasms 90 tablet 3     DULoxetine (CYMBALTA) 30 MG capsule Take 3 capsules (90 mg) by mouth daily (Patient taking differently: Take 60 mg by mouth daily) 90 capsule 2     estradiol (ESTRACE) 0.1 MG/GM vaginal cream Place 1 g vaginally three times a week 42.5 g 3     famotidine (PEPCID) 20 MG tablet TAKE 1 TABLET (20 MG) BY MOUTH 2 TIMES DAILY AS NEEDED 30 tablet 3     fluticasone (FLONASE) 50 MCG/ACT nasal spray Spray 1 spray into both nostrils daily       hydrOXYzine (VISTARIL) 25 MG capsule Take 25-50 mg by mouth 4 times daily as needed for itching or anxiety       loperamide (IMODIUM) 2 MG capsule Take 4 mg by mouth 4 times daily as needed for diarrhea       loratadine-pseudoePHEDrine (CLARITIN-D 24-HOUR)  MG 24 hr tablet Take 1 tablet by mouth daily as needed for allergies Usually takes in Spring/Fall       ondansetron (ZOFRAN) 8 MG tablet Take 1 tablet (8 mg) by mouth every 8 hours as needed (Nausea/Vomiting) 90 tablet 2     oxyCODONE (ROXICODONE) 5 MG tablet Take 1 tablet (5 mg) by mouth 4 times daily as needed for moderate to severe pain Maximum 4 pills/day. 120 tablet 0      pantoprazole (PROTONIX) 40 MG EC tablet Take 1 tablet (40 mg) by mouth 2 times daily 90 tablet 1     sennosides (SENOKOT) 8.6 MG tablet 1-2 tablets 1-2 times daily as needed. 120 tablet 1     simethicone 80 MG TABS Take 1 tablet by mouth every 6 hours as needed (bloating, gas, belching) 90 tablet 3     SUMAtriptan (IMITREX) 100 MG tablet Take 100 mg by mouth at onset of headache for migraine         Allergies   Allergen Reactions     Gluten Meal Palpitations     Augmentin Rash     Patient tolerated Zosyn in 7/2020     Pregabalin      interfered with Cymbalta     Topiramate      Tongue numbness, taste alteration, irritable      Acetaminophen Nausea     Urine retention       Dust Mite Extract      Gabapentin Dizziness and GI Disturbance     Ketorolac Headache     Latex Rash     Methadone Fatigue     Mold      Naprosyn [Naproxen] Dizziness and GI Disturbance     Seasonal Allergies        Mira Shannon, AEMT

## 2022-09-19 NOTE — LETTER
9/19/2022         RE: Nathalie Bashir  [address] [facility] [address]        Dear Colleague,    Thank you for referring your patient, Nathalie Bashir, to the [facility] [place]. Please see a copy of my visit note below.    Orthopaedic Surgery Hand and Upper Extremity Clinic H&P NOTE:  Date: Sep 19, 2022    Patient Name: Nathalie Bashir  MRN: [id number]    CHIEF COMPLAINT: left distal radius fx    Dominant Hand: Right  Occupation: Not working    HPI:  Ms. Nathalie Bashir is a 56 year old female right hand dominant who presents with left distal radius fracture sustained yesterday.  The patient was descending the stairs, unclear of what happened exactly, but next thing she knew she was on the floor having fallen, with left wrist pain and deformity.  She overall has been feeling well with some mild lightheadedness both prior to the fall as well as today.  From her description and from review of the ED notes, it is possible that she had a syncopal episode.  She notes that she has had some low blood pressures over the last several weeks as well as some medication adjustments.    The patient underwent closed reduction under hematoma block in the emergency department with splinting.  Pain is currently well controlled.  She denies any numbness or tingling.    The patient does have a complex medical history, including gastric adenocarcinoma in remission status postchemotherapy.  She also has a history of a left total hip arthroplasty in 2012 complicated by MRSA infection, requiring 1 revision.  She has used a cane in her right hand since that time.    She takes 4 tablets of oxycodone daily at baseline, prescribed by palliative care. She does not smoke.    PAST MEDICAL HISTORY:  Past Medical History:   Diagnosis Date     Allergic rhinitis      Cancer (H)     stomach cancer     Chronic pain      Depression      Gastric adenocarcinoma (H)      Lumbago      Migraine      Opioid  dependence (H)      Other chronic pain     low back     Sacroiliitis (H)     low back pain     Trochanteric bursitis     leg length discrrepancy, hip pain       PAST SURGICAL HISTORY:  Past Surgical History:   Procedure Laterality Date     ARTHROPLASTY HIP Left 2016     BACK SURGERY      L4-5 decompression     CATARACT IOL, RT/LT       CHOLECYSTECTOMY N/A 1/28/2020    Procedure: Cholecystectomy;  Surgeon: Yandel Mallory MD;  [facility]     ENDOSCOPIC RETROGRADE CHOLANGIOPANCREATOGRAM N/A 7/27/2020    Procedure: ENDOSCOPIC RETROGRADE CHOLANGIOPANCREATOGRAPHY  **Latex Allergy** with jejunal biopsies;  Surgeon: Jeet Aviles MD;  Location:  OR     ESOPHAGOSCOPY, GASTROSCOPY, DUODENOSCOPY (EGD), COMBINED N/A 3/3/2020    Procedure: ESOPHAGOGASTRODUODENOSCOPY, WITH ENDOSCOPIC US (enoxaparin);  Surgeon: Bakari Plata MD;  Location:  GI     ESOPHAGOSCOPY, GASTROSCOPY, DUODENOSCOPY (EGD), COMBINED N/A 12/7/2020    Procedure: Upper Endoscopy with stent placement;  Surgeon: Jeet Aviles MD;  Location:  OR     ESOPHAGOSCOPY, GASTROSCOPY, DUODENOSCOPY (EGD), COMBINED N/A 2/8/2021    Procedure: ESOPHAGOGASTRODUODENOSCOPY (EGD)AND STENT REMOVAL;  Surgeon: Jeet Aviles MD;  Location:  OR     EYE SURGERY       GASTRECTOMY N/A 10/21/2020    Procedure: Open subtotal gastectomy with David en Y Reconstruction; D1 Lymph Node Disection  **Latex Allergy**;  Surgeon: Yandel Mallory MD;  [facility]     IR CHEST PORT PLACEMENT > 5 YRS OF AGE  3/26/2020     IR LUMBAR EPIDURAL STEROID INJECTION  8/25/2009     IR LUMBAR EPIDURAL STEROID INJECTION  9/2/2009     IR LUMBAR EPIDURAL STEROID INJECTION  9/25/2009     IR LUMBAR EPIDURAL STEROID INJECTION  11/23/2009     IR LUMBAR EPIDURAL STEROID INJECTION  4/1/2010     IR LUMBAR EPIDURAL STEROID INJECTION  9/1/2010     IR LUMBAR EPIDURAL STEROID INJECTION  3/10/2011     IR LUMBAR EPIDURAL STEROID INJECTION  8/30/2011     LAPAROSCOPY DIAGNOSTIC  (GENERAL) N/A 10/13/2020    Procedure: Diagnostic laparoscopy with peritoneal washings  **Latex Allergy**;  Surgeon: Yandel Mallory MD;  [facility]     OPEN REDUCTION INTERNAL FIXATION RODDING INTRAMEDULLARY FEMUR Left 12/23/2014    Procedure: OPEN REDUCTION INTERNAL FIXATION RODDING INTRAMEDULLARY FEMUR;  Surgeon: Arnol Santiago MD;  [facility] OR     ORTHOPEDIC SURGERY       PICC DOUBLE LUMEN PLACEMENT Right 02/07/2020    5 fr double lumen 41 cm     AR LAMINEC/FACETECT/FORAMIN,LUMBAR 1 SEG      Description: Laminectomy Decompress, Facetectomy, Foraminotomy Lumbar Seg;  Recorded: 04/12/2012;     REMOVE HARDWARE LOWER EXTREMITY Left 4/7/2015    Procedure: REMOVE HARDWARE LOWER EXTREMITY;  Surgeon: Arnol Santiago MD;  [facility]     REMOVE HARDWARE RODDING INTRAMEDULLARY FEMUR Left 12/17/2015    Procedure: REMOVE HARDWARE RODDING INTRAMEDULLARY FEMUR;  Surgeon: Arnol Santiago MD;  [facility]     RESECT BONE LOWER EXTREMITY Left 12/23/2014    Procedure: RESECT BONE LOWER EXTREMITY;  Surgeon: Arnol Santiago MD;  [facility]     SHORTENING OSTEOPLASTY FEMUR W/ IM NAILING       WHIPPLE PROCEDURE N/A 1/28/2020    Procedure: Open Whipple procedure and Cholecystectomy;  Surgeon: Yandel Mallory MD;  [facility] OR     WHIPPLE PROCEDURE       ZZC FUSION OF SACROILIAC JOINT      Description: Arthrodesis Sacroiliac Joint Left;  Recorded: 10/07/2011;     ZZC REPAIR DETACH RETINA,SCLERAL BUCKLE         MEDICATIONS:  Current Outpatient Medications   Medication     amitriptyline (ELAVIL) 75 MG tablet     Cyanocobalamin 1000 MCG CAPS     cyclobenzaprine (FLEXERIL) 10 MG tablet     DULoxetine (CYMBALTA) 30 MG capsule     estradiol (ESTRACE) 0.1 MG/GM vaginal cream     famotidine (PEPCID) 20 MG tablet     fluticasone (FLONASE) 50 MCG/ACT nasal spray     hydrOXYzine (VISTARIL) 25 MG capsule     loperamide (IMODIUM) 2 MG capsule     loratadine-pseudoePHEDrine (CLARITIN-D 24-HOUR)  MG 24 hr  tablet     ondansetron (ZOFRAN) 8 MG tablet     oxyCODONE (ROXICODONE) 5 MG tablet     pantoprazole (PROTONIX) 40 MG EC tablet     sennosides (SENOKOT) 8.6 MG tablet     simethicone 80 MG TABS     SUMAtriptan (IMITREX) 100 MG tablet     No current facility-administered medications for this visit.       ALLERGIES:     Allergies   Allergen Reactions     Gluten Meal Palpitations     Augmentin Rash     Patient tolerated Zosyn in 2020     Pregabalin      interfered with Cymbalta     Topiramate      Tongue numbness, taste alteration, irritable      Acetaminophen Nausea     Urine retention       Dust Mite Extract      Gabapentin Dizziness and GI Disturbance     Ketorolac Headache     Latex Rash     Methadone Fatigue     Mold      Naprosyn [Naproxen] Dizziness and GI Disturbance     Seasonal Allergies        FAMILY HISTORY:  No pertinent family history    SOCIAL HISTORY:  Social History     Tobacco Use     Smoking status: Former Smoker     Packs/day: 0.50     Years: 25.00     Pack years: 12.50     Types: Cigarettes     Quit date: 2020     Years since quittin.7     Smokeless tobacco: Never Used   Substance Use Topics     Alcohol use: No     Drug use: No       The patient's past medical, family, and social history was reviewed and confirmed.    REVIEW OF SYMPTOMS:      General: Negative   Eyes: Negative   Ear, Nose and Throat: Negative   Respiratory: Negative   Cardiovascular: Negative   Gastrointestinal: Negative   Genito-urinary: Negative   Musculoskeletal: Negative  Neurological: Negative   Psychological: Negative  HEME: Negative   ENDO: Negative   SKIN: Negative    VITALS:  There were no vitals filed for this visit.    EXAM:  General: NAD, A&Ox3  HEENT: NC/AT  CV: RRR by peripheral pulse  Pulmonary: Non-labored breathing on RA  RUE:  Splint in place, c/d/i  Mild finger swelling  5/5 EPL/FPL/HI  SILT m/r/u  WWP CR< 2s    LABS:  WBC (10e9/L)   Date Value   2021 4.8   2021 10.8   2020 7.9     WBC  Count (10e3/uL)   Date Value   09/18/2022 8.8   08/26/2022 4.7   08/25/2022 5.6     Hemoglobin (g/dL)   Date Value   09/18/2022 12.1   08/26/2022 10.8 (L)   08/25/2022 10.6 (L)   05/21/2021 12.2   03/22/2021 11.1 (L)   12/07/2020 13.1     Platelet Count   Date Value   09/18/2022 229 10e3/uL   08/26/2022 175 10e3/uL   08/25/2022 166 10e3/uL   05/21/2021 197 10e9/L   03/22/2021 187 10e9/L   12/07/2020 211 10e9/L     CRP Inflammation (mg/L)   Date Value   08/25/2022 59.0 (H)   08/23/2022 63.90 (H)   07/29/2020 11.0 (H)   07/20/2020 11.0 (H)   12/07/2016 14.3 (H)          IMAGING:  X-rays of the left wrist today were compared to those from the time of injury.  These demonstrate a dorsally displaced intra-articular distal radius fracture with dorsal comminution.  Volar tilt neutral now status post closed reduction.    I have personally reviewed the above images and labs.       IMPRESSION AND RECOMMENDATIONS:  Ms. Nathalie Bashir is a 56 year old female right hand dominant with left distal radius fracture.    Operative and nonoperative treatment options discussed. If fracture alignment is maintained as it is now, she could be treated with a cast. She would remain in the cast for a minimum of 6 weeks. Would require rechecks weekly for 3 weeks to ensure alignment holds. Dorsal comminution puts her at risk of re-displacement. Surgery would allow her to mobilize earlier. Early function is better with surgery, but ultimate functional outcome with both operative and nonoperative treatment options is equal at a year.    After discussing the risks, benefits, and alternatives of both operative and nonoperative treatment, the patient elected to proceed with surgery.    The indications for surgery were discussed with the patient. The benefits, risks, and alternatives of operative management were discussed in detail with the patient. The patient understands that the risks of surgery include, but are not limited to: infection,  bleeding, injury to nearby structures (such as nerves, blood vessels, and tendons), nonunion, malunion, hardware failure/irritation or breakage, need for additional surgery, pain, stiffness, scarring, need for rehabilitation, and anesthetic complications. We did discuss that she may be at higher risk of infection and loss of fixation given her history of MRSA colonization and comorbidities. Patient expressed understanding and elected to proceed with surgery. All questions were answered to the patient's satisfaction.    Case request placed.    Mark Villalobos MD    Hand, Upper Extremity & Microvascular Surgery  Department of Orthopaedic Surgery  [facility]

## 2022-09-19 NOTE — TELEPHONE ENCOUNTER
FUTURE VISIT INFORMATION      SURGERY INFORMATION:    H&P for Surgery with Dr Villalobos on - tentative date, possibly     Consult: ov     RECORDS REQUESTED FROM:       Primary Care Provider: Marianne Gonzalez APRN, CNP- [facility]    Most recent EKG+ [id number]    Most recent ECHO: 20

## 2022-09-19 NOTE — PROGRESS NOTES
Orthopaedic Surgery Hand and Upper Extremity Clinic H&P NOTE:  Date: Sep 19, 2022    Patient Name: Nathalie Bashir  MRN: [id number]    CHIEF COMPLAINT: left distal radius fx    Dominant Hand: Right  Occupation: Not working    HPI:  Ms. Nathalie Bashir is a 56 year old female right hand dominant who presents with left distal radius fracture sustained yesterday.  The patient was descending the stairs, unclear of what happened exactly, but next thing she knew she was on the floor having fallen, with left wrist pain and deformity.  She overall has been feeling well with some mild lightheadedness both prior to the fall as well as today.  From her description and from review of the ED notes, it is possible that she had a syncopal episode.  She notes that she has had some low blood pressures over the last several weeks as well as some medication adjustments.    The patient underwent closed reduction under hematoma block in the emergency department with splinting.  Pain is currently well controlled.  She denies any numbness or tingling.    The patient does have a complex medical history, including gastric adenocarcinoma in remission status postchemotherapy.  She also has a history of a left total hip arthroplasty in 2012 complicated by MRSA infection, requiring 1 revision.  She has used a cane in her right hand since that time.    She takes 4 tablets of oxycodone daily at baseline, prescribed by palliative care. She does not smoke.    PAST MEDICAL HISTORY:  Past Medical History:   Diagnosis Date     Allergic rhinitis      Cancer (H)     stomach cancer     Chronic pain      Depression      Gastric adenocarcinoma (H)      Lumbago      Migraine      Opioid dependence (H)      Other chronic pain     low back     Sacroiliitis (H)     low back pain     Trochanteric bursitis     leg length discrrepancy, hip pain       PAST SURGICAL HISTORY:  Past Surgical History:   Procedure Laterality Date     ARTHROPLASTY HIP Left 2016      BACK SURGERY      L4-5 decompression     CATARACT IOL, RT/LT       CHOLECYSTECTOMY N/A 1/28/2020    Procedure: Cholecystectomy;  Surgeon: Yandel Mallory MD;  [facility]     ENDOSCOPIC RETROGRADE CHOLANGIOPANCREATOGRAM N/A 7/27/2020    Procedure: ENDOSCOPIC RETROGRADE CHOLANGIOPANCREATOGRAPHY  **Latex Allergy** with jejunal biopsies;  Surgeon: Jeet Aviles MD;  [facility] OR     ESOPHAGOSCOPY, GASTROSCOPY, DUODENOSCOPY (EGD), COMBINED N/A 3/3/2020    Procedure: ESOPHAGOGASTRODUODENOSCOPY, WITH ENDOSCOPIC US (enoxaparin);  Surgeon: Bakari Plata MD;  [facility]     ESOPHAGOSCOPY, GASTROSCOPY, DUODENOSCOPY (EGD), COMBINED N/A 12/7/2020    Procedure: Upper Endoscopy with stent placement;  Surgeon: Jeet Aviles MD;  Location:  OR     ESOPHAGOSCOPY, GASTROSCOPY, DUODENOSCOPY (EGD), COMBINED N/A 2/8/2021    Procedure: ESOPHAGOGASTRODUODENOSCOPY (EGD)AND STENT REMOVAL;  Surgeon: Jeet Aviles MD;  Location:  OR     EYE SURGERY       GASTRECTOMY N/A 10/21/2020    Procedure: Open subtotal gastectomy with David en Y Reconstruction; D1 Lymph Node Disection  **Latex Allergy**;  Surgeon: Yandel Mallory MD;  [facility] OR     IR CHEST PORT PLACEMENT > 5 YRS OF AGE  3/26/2020     IR LUMBAR EPIDURAL STEROID INJECTION  8/25/2009     IR LUMBAR EPIDURAL STEROID INJECTION  9/2/2009     IR LUMBAR EPIDURAL STEROID INJECTION  9/25/2009     IR LUMBAR EPIDURAL STEROID INJECTION  11/23/2009     IR LUMBAR EPIDURAL STEROID INJECTION  4/1/2010     IR LUMBAR EPIDURAL STEROID INJECTION  9/1/2010     IR LUMBAR EPIDURAL STEROID INJECTION  3/10/2011     IR LUMBAR EPIDURAL STEROID INJECTION  8/30/2011     LAPAROSCOPY DIAGNOSTIC (GENERAL) N/A 10/13/2020    Procedure: Diagnostic laparoscopy with peritoneal washings  **Latex Allergy**;  Surgeon: Yandel Mallory MD;  [facility]     OPEN REDUCTION INTERNAL FIXATION RODDING INTRAMEDULLARY FEMUR Left 12/23/2014    Procedure: OPEN REDUCTION INTERNAL  FIXATION RODDING INTRAMEDULLARY FEMUR;  Surgeon: Arnol Santiago MD;  [facility] OR     ORTHOPEDIC SURGERY       PICC DOUBLE LUMEN PLACEMENT Right 02/07/2020    5 fr double lumen 41 cm     OH LAMINEC/FACETECT/FORAMIN,LUMBAR 1 SEG      Description: Laminectomy Decompress, Facetectomy, Foraminotomy Lumbar Seg;  Recorded: 04/12/2012;     REMOVE HARDWARE LOWER EXTREMITY Left 4/7/2015    Procedure: REMOVE HARDWARE LOWER EXTREMITY;  Surgeon: Arnol Santiago MD;  [facility]     REMOVE HARDWARE RODDING INTRAMEDULLARY FEMUR Left 12/17/2015    Procedure: REMOVE HARDWARE RODDING INTRAMEDULLARY FEMUR;  Surgeon: Arnol Santiago MD;  [facility]     RESECT BONE LOWER EXTREMITY Left 12/23/2014    Procedure: RESECT BONE LOWER EXTREMITY;  Surgeon: Arnol Santiago MD;  [facility]     SHORTENING OSTEOPLASTY FEMUR W/ IM NAILING       WHIPPLE PROCEDURE N/A 1/28/2020    Procedure: Open Whipple procedure and Cholecystectomy;  Surgeon: Yandel Mallory MD;  [facility]: [facility] OR     WHIPPLE PROCEDURE       ZZC FUSION OF SACROILIAC JOINT      Description: Arthrodesis Sacroiliac Joint Left;  Recorded: 10/07/2011;     ZZC REPAIR DETACH RETINA,SCLERAL BUCKLE         MEDICATIONS:  Current Outpatient Medications   Medication     amitriptyline (ELAVIL) 75 MG tablet     Cyanocobalamin 1000 MCG CAPS     cyclobenzaprine (FLEXERIL) 10 MG tablet     DULoxetine (CYMBALTA) 30 MG capsule     estradiol (ESTRACE) 0.1 MG/GM vaginal cream     famotidine (PEPCID) 20 MG tablet     fluticasone (FLONASE) 50 MCG/ACT nasal spray     hydrOXYzine (VISTARIL) 25 MG capsule     loperamide (IMODIUM) 2 MG capsule     loratadine-pseudoePHEDrine (CLARITIN-D 24-HOUR)  MG 24 hr tablet     ondansetron (ZOFRAN) 8 MG tablet     oxyCODONE (ROXICODONE) 5 MG tablet     pantoprazole (PROTONIX) 40 MG EC tablet     sennosides (SENOKOT) 8.6 MG tablet     simethicone 80 MG TABS     SUMAtriptan (IMITREX) 100 MG tablet     No current facility-administered  medications for this visit.       ALLERGIES:     Allergies   Allergen Reactions     Gluten Meal Palpitations     Augmentin Rash     Patient tolerated Zosyn in 2020     Pregabalin      interfered with Cymbalta     Topiramate      Tongue numbness, taste alteration, irritable      Acetaminophen Nausea     Urine retention       Dust Mite Extract      Gabapentin Dizziness and GI Disturbance     Ketorolac Headache     Latex Rash     Methadone Fatigue     Mold      Naprosyn [Naproxen] Dizziness and GI Disturbance     Seasonal Allergies        FAMILY HISTORY:  No pertinent family history    SOCIAL HISTORY:  Social History     Tobacco Use     Smoking status: Former Smoker     Packs/day: 0.50     Years: 25.00     Pack years: 12.50     Types: Cigarettes     Quit date: 2020     Years since quittin.7     Smokeless tobacco: Never Used   Substance Use Topics     Alcohol use: No     Drug use: No       The patient's past medical, family, and social history was reviewed and confirmed.    REVIEW OF SYMPTOMS:      General: Negative   Eyes: Negative   Ear, Nose and Throat: Negative   Respiratory: Negative   Cardiovascular: Negative   Gastrointestinal: Negative   Genito-urinary: Negative   Musculoskeletal: Negative  Neurological: Negative   Psychological: Negative  HEME: Negative   ENDO: Negative   SKIN: Negative    VITALS:  There were no vitals filed for this visit.    EXAM:  General: NAD, A&Ox3  HEENT: NC/AT  CV: RRR by peripheral pulse  Pulmonary: Non-labored breathing on RA  RUE:  Splint in place, c/d/i  Mild finger swelling  5/5 EPL/FPL/HI  SILT m/r/u  WWP CR< 2s    LABS:  WBC (10e9/L)   Date Value   2021 4.8   2021 10.8   2020 7.9     WBC Count (10e3/uL)   Date Value   2022 8.8   2022 4.7   2022 5.6     Hemoglobin (g/dL)   Date Value   2022 12.1   2022 10.8 (L)   2022 10.6 (L)   2021 12.2   2021 11.1 (L)   2020 13.1     Platelet Count   Date Value    09/18/2022 229 10e3/uL   08/26/2022 175 10e3/uL   08/25/2022 166 10e3/uL   05/21/2021 197 10e9/L   03/22/2021 187 10e9/L   12/07/2020 211 10e9/L     CRP Inflammation (mg/L)   Date Value   08/25/2022 59.0 (H)   08/23/2022 63.90 (H)   07/29/2020 11.0 (H)   07/20/2020 11.0 (H)   12/07/2016 14.3 (H)          IMAGING:  X-rays of the left wrist today were compared to those from the time of injury.  These demonstrate a dorsally displaced intra-articular distal radius fracture with dorsal comminution.  Volar tilt neutral now status post closed reduction.    I have personally reviewed the above images and labs.       IMPRESSION AND RECOMMENDATIONS:  Ms. Nathalie Bashir is a 56 year old female right hand dominant with left distal radius fracture.    Operative and nonoperative treatment options discussed. If fracture alignment is maintained as it is now, she could be treated with a cast. She would remain in the cast for a minimum of 6 weeks. Would require rechecks weekly for 3 weeks to ensure alignment holds. Dorsal comminution puts her at risk of re-displacement. Surgery would allow her to mobilize earlier. Early function is better with surgery, but ultimate functional outcome with both operative and nonoperative treatment options is equal at a year.    After discussing the risks, benefits, and alternatives of both operative and nonoperative treatment, the patient elected to proceed with surgery.    The indications for surgery were discussed with the patient. The benefits, risks, and alternatives of operative management were discussed in detail with the patient. The patient understands that the risks of surgery include, but are not limited to: infection, bleeding, injury to nearby structures (such as nerves, blood vessels, and tendons), nonunion, malunion, hardware failure/irritation or breakage, need for additional surgery, pain, stiffness, scarring, need for rehabilitation, and anesthetic complications. We did discuss  that she may be at higher risk of infection and loss of fixation given her history of MRSA colonization and comorbidities. Patient expressed understanding and elected to proceed with surgery. All questions were answered to the patient's satisfaction.    Case request placed.    Mark Villalobos MD    Hand, Upper Extremity & Microvascular Surgery  Department of Orthopaedic Surgery  [facility]

## 2022-09-20 ENCOUNTER — TELEPHONE (OUTPATIENT)
Dept: ORTHOPEDICS | Facility: CLINIC | Age: 57
End: 2022-09-20

## 2022-09-20 DIAGNOSIS — R11.0 NAUSEA: ICD-10-CM

## 2022-09-20 LAB — SARS-COV-2 RNA RESP QL NAA+PROBE: NEGATIVE

## 2022-09-20 RX ORDER — ONDANSETRON 8 MG/1
8 TABLET, FILM COATED ORAL EVERY 8 HOURS PRN
Qty: 90 TABLET | Refills: 2 | Status: SHIPPED | OUTPATIENT
Start: 2022-09-20 | End: 2024-06-07

## 2022-09-20 NOTE — TELEPHONE ENCOUNTER
Patient is scheduled for surgery with Dr. Villalobos    Spoke with: Nathalie    Date of Surgery: 9/22/22    Location: [facility]    Informed patient they will need an adult  yes    Pre op with Provider n/a    H&P: Scheduled with PAC    Pre-procedure COVID-19 Test: 9/19/22    Additional imaging/appointments: POP scheduled    Surgery packet: Given in clinic     Additional comments: n/a

## 2022-09-20 NOTE — NURSING NOTE
Teaching Flowsheet   Relevant Diagnosis: Left distal radius fracture  Teaching Topic: ORIF left distal radius    [facility] [place] [facility] [place] [facility] with Dr Mark Villalobos.  DOS 9-22-22  PAC visit arranged for patient  [place] [facility] [place] of [place] today.  Medical Hx diagnosis reviewed     Person(s) involved in teaching:   Patient and      Motivation Level:  Asks Questions: Yes  Eager to Learn: Yes  Cooperative: Yes  Receptive (willing/able to accept information): Yes  Any cultural factors/Church beliefs that may influence understanding or compliance? No    Patient demonstrates understanding of the following:  Reason for the appointment, diagnosis and treatment plan: Yes  Knowledge of proper use of medications and conditions for which they are ordered (with special attention to potential side effects or drug interactions): Yes  Which situations necessitate calling provider and whom to contact: Yes    Teaching Concerns Addressed:   Proper use and care of  (medical equip, care aids, etc.): Yes  Nutritional needs and diet plan: Yes  Pain management techniques: Yes  Wound Care: Yes  How and/when to access community resources: Yes     Instructional Materials Used/Given: Preoperative surgery packet, antibacterial Chlorhexidine soap. Stop Light Tool reviewed, patient verbalized understanding, had no immediate questions. Veronica Mar RN

## 2022-09-20 NOTE — TELEPHONE ENCOUNTER
Received voicemail from patient requesting refill of ondansetron.     Last office visit: 7/27/22  Scheduled for follow up 11/2/22     Will route request to MD for review.

## 2022-09-21 ENCOUNTER — ANESTHESIA EVENT (OUTPATIENT)
Dept: SURGERY | Facility: AMBULATORY SURGERY CENTER | Age: 57
End: 2022-09-21
Payer: MEDICARE

## 2022-09-21 ENCOUNTER — LAB (OUTPATIENT)
Dept: LAB | Facility: CLINIC | Age: 57
End: 2022-09-21

## 2022-09-21 ENCOUNTER — OFFICE VISIT (OUTPATIENT)
Dept: SURGERY | Facility: CLINIC | Age: 57
End: 2022-09-21
Payer: MEDICARE

## 2022-09-21 VITALS
SYSTOLIC BLOOD PRESSURE: 104 MMHG | WEIGHT: 178.5 LBS | RESPIRATION RATE: 16 BRPM | TEMPERATURE: 97.9 F | DIASTOLIC BLOOD PRESSURE: 73 MMHG | HEART RATE: 107 BPM | BODY MASS INDEX: 27.05 KG/M2 | OXYGEN SATURATION: 98 % | HEIGHT: 68 IN

## 2022-09-21 DIAGNOSIS — Z01.818 PRE-OP EVALUATION: Primary | ICD-10-CM

## 2022-09-21 DIAGNOSIS — Z01.818 PREOP EXAMINATION: Primary | ICD-10-CM

## 2022-09-21 LAB — HOLD SPECIMEN: NORMAL

## 2022-09-21 PROCEDURE — 99215 OFFICE O/P EST HI 40 MIN: CPT | Performed by: NURSE PRACTITIONER

## 2022-09-21 ASSESSMENT — ENCOUNTER SYMPTOMS: ORTHOPNEA: 0

## 2022-09-21 ASSESSMENT — PAIN SCALES - GENERAL: PAINLEVEL: EXTREME PAIN (9)

## 2022-09-21 ASSESSMENT — LIFESTYLE VARIABLES: TOBACCO_USE: 1

## 2022-09-21 NOTE — H&P
Pre-Operative H & P     CC:  Preoperative exam to assess for increased cardiopulmonary risk while undergoing surgery and anesthesia.    Date of Encounter: 9/21/2022  Primary Care Physician:  Alban Yuan     Reason for visit:   [facility]  Nathalie Bashir is a 56 year old female who presents for pre-operative H & P in preparation for  Procedure Information     Case: [id number] Date/Time: 09/22/22 0715    Procedure: LEFT WRIST OPEN REDUCTION INTERNAL FIXATION, FRACTURE (Left Wrist)    Anesthesia type: MAC with Regional    Diagnosis: Other closed intra-articular fracture of distal end of left radius, initial encounter [S52.572A]    Pre-op diagnosis: Other closed intra-articular fracture of distal end of left radius, initial encounter [A42.742A]    Location: [place] [address] / [facility]    Providers: Mark Villalobos MD          Dena Bashir is a 56 year old female with a PMH of including gastric adenocarcinoma in remission status post chemotherapy.  She also has a history of a left total hip arthroplasty in 2012 complicated by MRSA infection, requiring 1 revision, migraines, chronic pain, opiod dependence, Crohn's disease, anemia, depression, neuropathy and pylonenephritis.   She sustained a left distal radius fracture on 9/18/2022 when descending the stairs, she fell landing on the floor. Noted at the time was pain and wrist deformity. She was unclear as to what happened, possibly had a pre- syncopal episode. She reports that she has been experiencing low blood pressure recently. Seen in the ED she underwent closed reduction under hematoma block in the emergency department with splinting.  Pain is currently well controlled.  She denies any numbness or tingling.  She consulted with Dr. Villalobos and presents today in preparation for the above procedure.       History is obtained from the patient and chart review    Hx of abnormal bleeding or anti-platelet use: no    Menstrual history:  Patient's last menstrual period was 09/23/2014.:      Past Medical History  Past Medical History:   Diagnosis Date     Allergic rhinitis      Cancer (H)     stomach cancer     Chronic pain      Closed fracture of distal end of left radius      Depression      Gastric adenocarcinoma (H)      Lumbago      Migraine      Opioid dependence (H)      Other chronic pain     low back     Sacroiliitis (H)     low back pain     Trochanteric bursitis     leg length discrrepancy, hip pain       Past Surgical History  Past Surgical History:   Procedure Laterality Date     ARTHROPLASTY HIP Left 2016     BACK SURGERY      L4-5 decompression     CATARACT IOL, RT/LT       CHOLECYSTECTOMY N/A 1/28/2020    Procedure: Cholecystectomy;  Surgeon: Yandel Mallory MD;  [facility]     ENDOSCOPIC RETROGRADE CHOLANGIOPANCREATOGRAM N/A 7/27/2020    Procedure: ENDOSCOPIC RETROGRADE CHOLANGIOPANCREATOGRAPHY  **Latex Allergy** with jejunal biopsies;  Surgeon: Jeet Aviles MD;  Location:  OR     ESOPHAGOSCOPY, GASTROSCOPY, DUODENOSCOPY (EGD), COMBINED N/A 3/3/2020    Procedure: ESOPHAGOGASTRODUODENOSCOPY, WITH ENDOSCOPIC US (enoxaparin);  Surgeon: Bakari Plata MD;  Location:  GI     ESOPHAGOSCOPY, GASTROSCOPY, DUODENOSCOPY (EGD), COMBINED N/A 12/7/2020    Procedure: Upper Endoscopy with stent placement;  Surgeon: Jeet Aviles MD;  Location:  OR     ESOPHAGOSCOPY, GASTROSCOPY, DUODENOSCOPY (EGD), COMBINED N/A 2/8/2021    Procedure: ESOPHAGOGASTRODUODENOSCOPY (EGD)AND STENT REMOVAL;  Surgeon: Jeet Aviles MD;  Location:  OR     EYE SURGERY       GASTRECTOMY N/A 10/21/2020    Procedure: Open subtotal gastectomy with David en Y Reconstruction; D1 Lymph Node Disection  **Latex Allergy**;  Surgeon: Yandel Mallory MD;  [facility]     IR CHEST PORT PLACEMENT > 5 YRS OF AGE  3/26/2020     IR LUMBAR EPIDURAL STEROID INJECTION  8/25/2009     IR LUMBAR EPIDURAL STEROID INJECTION  9/2/2009     IR LUMBAR  EPIDURAL STEROID INJECTION  9/25/2009     IR LUMBAR EPIDURAL STEROID INJECTION  11/23/2009     IR LUMBAR EPIDURAL STEROID INJECTION  4/1/2010     IR LUMBAR EPIDURAL STEROID INJECTION  9/1/2010     IR LUMBAR EPIDURAL STEROID INJECTION  3/10/2011     IR LUMBAR EPIDURAL STEROID INJECTION  8/30/2011     LAPAROSCOPY DIAGNOSTIC (GENERAL) N/A 10/13/2020    Procedure: Diagnostic laparoscopy with peritoneal washings  **Latex Allergy**;  Surgeon: Yandel Mallory MD;  [facility]     OPEN REDUCTION INTERNAL FIXATION RODDING INTRAMEDULLARY FEMUR Left 12/23/2014    Procedure: OPEN REDUCTION INTERNAL FIXATION RODDING INTRAMEDULLARY FEMUR;  Surgeon: Arnol Santiago MD;  Location: [facility] OR     ORTHOPEDIC SURGERY       PICC DOUBLE LUMEN PLACEMENT Right 02/07/2020    5 fr double lumen 41 cm     IA LAMINEC/FACETECT/FORAMIN,LUMBAR 1 SEG      Description: Laminectomy Decompress, Facetectomy, Foraminotomy Lumbar Seg;  Recorded: 04/12/2012;     REMOVE HARDWARE LOWER EXTREMITY Left 4/7/2015    Procedure: REMOVE HARDWARE LOWER EXTREMITY;  Surgeon: Arnol Santiago MD;  [facility]     REMOVE HARDWARE RODDING INTRAMEDULLARY FEMUR Left 12/17/2015    Procedure: REMOVE HARDWARE RODDING INTRAMEDULLARY FEMUR;  Surgeon: Arnol Santiago MD;  [facility]     RESECT BONE LOWER EXTREMITY Left 12/23/2014    Procedure: RESECT BONE LOWER EXTREMITY;  Surgeon: Arnol Santiago MD;  [facility]     SHORTENING OSTEOPLASTY FEMUR W/ IM NAILING       WHIPPLE PROCEDURE N/A 1/28/2020    Procedure: Open Whipple procedure and Cholecystectomy;  Surgeon: Yandel Mallory MD;  [facility]     WHIPPLE PROCEDURE       ZZC FUSION OF SACROILIAC JOINT      Description: Arthrodesis Sacroiliac Joint Left;  Recorded: 10/07/2011;     ZZC REPAIR DETACH RETINA,SCLERAL BUCKLE         Prior to Admission Medications  Current Outpatient Medications   Medication Sig Dispense Refill     amitriptyline (ELAVIL) 75 MG tablet Take 1 tablet (75 mg) by mouth At  Bedtime 30 tablet 3     Cyanocobalamin 1000 MCG CAPS Take 1 tablet by mouth every morning       cyclobenzaprine (FLEXERIL) 10 MG tablet Take 1 tablet (10 mg) by mouth 3 times daily as needed for muscle spasms 90 tablet 3     DULoxetine (CYMBALTA) 30 MG capsule Take 3 capsules (90 mg) by mouth daily (Patient taking differently: Take 60 mg by mouth every morning) 90 capsule 2     famotidine (PEPCID) 20 MG tablet TAKE 1 TABLET (20 MG) BY MOUTH 2 TIMES DAILY AS NEEDED 30 tablet 3     fluticasone (FLONASE) 50 MCG/ACT nasal spray Spray 1 spray into both nostrils daily at 2 pm       hydrOXYzine (VISTARIL) 25 MG capsule Take 25-50 mg by mouth 4 times daily as needed for itching or anxiety       loperamide (IMODIUM) 2 MG capsule Take 4 mg by mouth 4 times daily as needed for diarrhea       loratadine-pseudoePHEDrine (CLARITIN-D 24-HOUR)  MG 24 hr tablet Take 1 tablet by mouth daily as needed for allergies Usually takes in Spring/Fall       ondansetron (ZOFRAN) 8 MG tablet Take 1 tablet (8 mg) by mouth every 8 hours as needed (Nausea/Vomiting) 90 tablet 2     oxyCODONE (ROXICODONE) 5 MG tablet Take 1 tablet (5 mg) by mouth 4 times daily as needed for moderate to severe pain Maximum 4 pills/day. 120 tablet 0     pantoprazole (PROTONIX) 40 MG EC tablet Take 1 tablet (40 mg) by mouth 2 times daily 90 tablet 1     sennosides (SENOKOT) 8.6 MG tablet 1-2 tablets 1-2 times daily as needed. 120 tablet 1     simethicone 80 MG TABS Take 1 tablet by mouth every 6 hours as needed (bloating, gas, belching) 90 tablet 3     SUMAtriptan (IMITREX) 100 MG tablet Take 100 mg by mouth at onset of headache for migraine       estradiol (ESTRACE) 0.1 MG/GM vaginal cream Place 1 g vaginally three times a week (Patient not taking: Reported on 9/21/2022) 42.5 g 3       Allergies  Allergies   Allergen Reactions     Gluten Meal Palpitations     Augmentin Rash     Patient tolerated Zosyn in 7/2020     Pregabalin      interfered with Cymbalta      Topiramate      Tongue numbness, taste alteration, irritable      Acetaminophen Nausea     Urine retention       Dust Mite Extract      Gabapentin Dizziness and GI Disturbance     Ketorolac Headache     Latex Rash     Methadone Fatigue     Mold      Naprosyn [Naproxen] Dizziness and GI Disturbance     Seasonal Allergies        Social History  Social History     Socioeconomic History     Marital status:      Spouse name: Not on file     Number of children: 1     Years of education: Not on file     Highest education level: Not on file   Occupational History     Occupation: retired   Tobacco Use     Smoking status: Former Smoker     Packs/day: 0.50     Years: 25.00     Pack years: 12.50     Types: Cigarettes     Quit date: 2020     Years since quittin.7     Smokeless tobacco: Never Used   Substance and Sexual Activity     Alcohol use: No     Drug use: No     Sexual activity: Not on file   Other Topics Concern     Parent/sibling w/ CABG, MI or angioplasty before 65F 55M? Not Asked   Social History Narrative     Not on file     Social Determinants of Health     Financial Resource Strain: Not on file   Food Insecurity: Not on file   Transportation Needs: Not on file   Physical Activity: Not on file   Stress: Not on file   Social Connections: Not on file   Intimate Partner Violence: Not on file   Housing Stability: Not on file       Family History  Family History   Problem Relation Age of Onset     Heart Failure Mother 77     Dementia Father         frontal lobe     No Known Problems Sister      No Known Problems Brother      Anesthesia Reaction No family hx of      Deep Vein Thrombosis No family hx of        Review of Systems  The complete review of systems is negative other than noted in the HPI or here.   Anesthesia Evaluation   Pt has had prior anesthetic. Type: General and MAC.    No history of anesthetic complications       ROS/MED HX  ENT/Pulmonary:     (+) tobacco use, Past use,     Neurologic:    "  (+) peripheral neuropathy (bilateral hands and feet), - bilateral numbness, tingling pain hands and feet. migraines,     Cardiovascular: Comment: Has mild dizziness recently, can be either be while standing or lying still    (+) -----Previous cardiac testing   Echo: Date: 7/2022 Results:    Stress Test: Date: Results:    ECG Reviewed: Date: 9/19/2022 Results:  Sinus rhythm with sinus arrhythmia   Cath: Date: Results:   (-) GUZMÁN and orthopnea/PND   METS/Exercise Tolerance: 3 - Able to walk 1-2 blocks without stopping Comment: Uses cane for stability s/p hip replacement ( re-do)   Hematologic: Comments: HGB stable 10.8    (+) anemia,     Musculoskeletal: Comment: Cervical radiculopathy ( chronic)  Bursitis left hip  Chronic bilateral low back pain  (+) fracture, Fracture location: LLE (s/p ORIF as child),     GI/Hepatic:     (+) GERD, Asymptomatic on medication, cholecystitis/cholelithiasis,     Renal/Genitourinary:  - neg Renal ROS     Endo:  - neg endo ROS     Psychiatric/Substance Use:     (+) psychiatric history depression H/O chronic opiod use .     Infectious Disease: Comment: H/o MRSA after initial hip surgery- 2013  Vaccinated for covid    (+) MRSA,  (-) Recent Fever   Malignancy: Comment: S/p whipple 2020  (+) Malignancy, History of Other.Other CA Gastric adenocarcinoma Remission status post Surgery and Chemo.    Other:      (+) , H/O Chronic Pain,        /73 (BP Location: Right arm, Patient Position: Sitting, Cuff Size: Adult Regular)   Pulse 107   Temp 97.9  F (36.6  C) (Oral)   Resp 16   Ht 1.727 m (5' 8\")   Wt 81 kg (178 lb 8 oz)   LMP 09/23/2014   SpO2 98%   Breastfeeding No   BMI 27.14 kg/m      Physical Exam   Constitutional: Awake, alert, cooperative, no apparent distress, and appears stated age.  Eyes: Pupils equal, round and reactive to light, extra ocular muscles intact, sclera clear, conjunctiva normal.  HENT: Normocephalic, oral pharynx with moist mucus membranes, good dentition. " No goiter appreciated.   Respiratory: Clear to auscultation bilaterally, no crackles or wheezing.  Cardiovascular: Regular rate and rhythm, normal S1 and S2, and no murmur noted.  Carotids +2, no bruits. No edema. Palpable pulses to radial  DP and PT arteries.   GI: Normal bowel sounds, soft, non-distended, non-tender, no masses palpated, no hepatosplenomegaly.  Surgical scars: healed  Lymph/Hematologic: No cervical lymphadenopathy and no supraclavicular lymphadenopathy.  Genitourinary:  deferred  Skin: Warm and dry.  No rashes at anticipated surgical site.   Musculoskeletal: Full ROM of neck. There is no redness, warmth, or swelling of the joints.  Neurologic: Awake, alert, oriented to name, place and time. Cranial nerves II-XII are grossly intact. Did not observe, she uses cane for balance.   Neuropsychiatric: Calm, cooperative. Normal affect.     Prior Labs/Diagnostic Studies   All labs and imaging personally reviewed         EXAM: XR WRIST LEFT G/E 3 VIEWS  LOCATION: [facility]  DATE/TIME: 9/18/2022 2:37 PM     INDICATION: post reduction  COMPARISON: Examination performed earlier in the day.                                                                      IMPRESSION: Distal radial metaphyseal fracture. Dorsal angulation has been reduced in the interval. Mild dorsal displacement has improved in the interval. Stable radial displacement. Splint is in place    3 views left wrist radiographs 9/19/2022 1:58 PM     History: Wrist pain     Comparison: 9/18/2022     Findings:     PA, oblique and lateral view(s) of the left wrist were obtained. Chest  material obscures underlying bony detail.     Redemonstration impacted comminuted distal radial fracture with  similar alignment. Fracture line remains persistently visible. Trace  ulnar positive variance.     Dorsal soft tissue swelling just distal to the dorsal cast margin.                                                                       IMPRESSION: Redemonstration impacted comminuted distal radial fracture  with similar alignment. Fracture line remains persistently visible.       EKG/ stress test - if available please see in ROS above   7/2022  Echo result w/o MOPS: Interpretation SummaryGlobal and regional left ventricular function is normal with an EF of 60-65%.Right ventricular function, chamber size, wall motion, and thickness arenormal.Pulmonary artery systolic pressure cannot be assessed.The inferior vena cava is normal.No pericardial effusion is present.Previous study not available for comparison.      No flowsheet data found.      The patient's records and results personally reviewed by this provider.     Outside records reviewed from: Care Everywhere    LAB/DIAGNOSTIC STUDIES TODAY:    Lab Results   Component Value Date    WBC 8.8 09/18/2022    WBC 4.8 05/21/2021     Lab Results   Component Value Date    RBC 3.92 09/18/2022    RBC 3.90 05/21/2021     Lab Results   Component Value Date    HGB 12.1 09/18/2022    HGB 12.2 05/21/2021     Lab Results   Component Value Date    HCT 36.9 09/18/2022    HCT 38.2 05/21/2021     No components found for: MCT  Lab Results   Component Value Date    MCV 94 09/18/2022    MCV 98 05/21/2021     Lab Results   Component Value Date    MCH 30.9 09/18/2022    MCH 31.3 05/21/2021     Lab Results   Component Value Date    MCHC 32.8 09/18/2022    MCHC 31.9 05/21/2021     Lab Results   Component Value Date    RDW 14.8 09/18/2022    RDW 13.0 05/21/2021     Lab Results   Component Value Date     09/18/2022     05/21/2021     Last Comprehensive Metabolic Panel:  Sodium   Date Value Ref Range Status   09/18/2022 139 136 - 145 mmol/L Final   05/21/2021 139 133 - 144 mmol/L Final     Potassium   Date Value Ref Range Status   09/18/2022 3.7 3.4 - 5.3 mmol/L Final   08/26/2022 5.0 3.4 - 5.3 mmol/L Final   05/21/2021 4.2 3.4 - 5.3 mmol/L Final     Chloride   Date Value Ref Range Status    09/18/2022 101 98 - 107 mmol/L Final   08/26/2022 110 (H) 94 - 109 mmol/L Final   05/21/2021 107 94 - 109 mmol/L Final     Carbon Dioxide   Date Value Ref Range Status   05/21/2021 29 20 - 32 mmol/L Final     Carbon Dioxide (CO2)   Date Value Ref Range Status   09/18/2022 32 (H) 22 - 29 mmol/L Final   08/26/2022 30 20 - 32 mmol/L Final     Anion Gap   Date Value Ref Range Status   09/18/2022 6 (L) 7 - 15 mmol/L Final   08/26/2022 3 3 - 14 mmol/L Final   05/21/2021 3 3 - 14 mmol/L Final     Glucose   Date Value Ref Range Status   09/18/2022 102 (H) 70 - 99 mg/dL Final   08/26/2022 99 70 - 99 mg/dL Final   05/21/2021 102 (H) 70 - 99 mg/dL Final     Urea Nitrogen   Date Value Ref Range Status   09/18/2022 10.5 6.0 - 20.0 mg/dL Final   08/26/2022 7 7 - 30 mg/dL Final   05/21/2021 12 7 - 30 mg/dL Final     Creatinine   Date Value Ref Range Status   09/18/2022 0.90 0.51 - 0.95 mg/dL Final   05/21/2021 0.93 0.52 - 1.04 mg/dL Final     GFR Estimate   Date Value Ref Range Status   09/18/2022 75 >60 mL/min/1.73m2 Final     Comment:     Effective December 21, 2021 eGFRcr in adults is calculated using the 2021 CKD-EPI creatinine equation which includes age and gender (Allie et al., NEJ, DOI: 10.1056/HUXYlh2661920)   05/21/2021 70 >60 mL/min/[1.73_m2] Final     Comment:     Non  GFR Calc  Starting 12/18/2018, serum creatinine based estimated GFR (eGFR) will be   calculated using the Chronic Kidney Disease Epidemiology Collaboration   (CKD-EPI) equation.       Calcium   Date Value Ref Range Status   09/18/2022 9.0 8.6 - 10.0 mg/dL Final   05/21/2021 8.5 8.5 - 10.1 mg/dL Final         Assessment      Nathalie Bashir is a 56 year old female seen as a PAC referral for risk assessment and optimization for anesthesia.    Plan/Recommendations  Pt will be optimized for the proposed procedure.  See below for details on the assessment, risk, and preoperative recommendations    NEUROLOGY  - No history of TIA, CVA or  "seizure   - Neuropathy, bilateral hands and feet  - Migraines 1-2 monthly- Uses Imitrex  - - Chronic Pain  On chronic opiates, morphine equivilant = 30 MME/Day   -Post Op delirium risk factors:  No risk identified    ENT  - No current airway concerns.  Will need to be reassessed day of surgery.  Mallampati: I  TM: > 3    CARDIAC  - No history of CAD, Hypertension and Afib   No anginal symptoms, Denies palpitations, PND  patient reported experiencing low blood pressure at times. Doesn't check pressures at home.   - light headedness - intermittently occurs when standing or laying down. Denies syncope  - METS (Metabolic Equivalents) uses can for assist for balance after 2nd hip replacement able to walk 1-2 blocks with cane  Patient CANNOT perform 4 METS exercise without symptoms            Total Score: 1    Functional Capacity: Unable to complete 4 METS      RCRI-Very low risk: Class 1 0.4% complication rate            Total Score: 0        PULMONARY    LISHA Low Risk            Total Score: 1    LISHA: Over 50 ys old      - Denies asthma or inhaler use  - Tobacco History      History   Smoking Status     Former Smoker     Packs/day: 0.50     Years: 25.00     Types: Cigarettes     Quit date: 1/1/2020   Smokeless Tobacco     Never Used       GI  - GERD  Controlled on medications: Proton Pump Inhibitor     History of gastric adenocarcinoma, s/p whipple s/p chemotherapy    PONV High Risk  Total Score: 3           1 AN PONV: Pt is Female    1 AN PONV: Patient is not a current smoker    1 AN PONV: Intended Post Op Opioids          ENDOCRINE    - BMI: Estimated body mass index is 27.14 kg/m  as calculated from the following:    Height as of this encounter: 1.727 m (5' 8\").    Weight as of this encounter: 81 kg (178 lb 8 oz).  Overweight (BMI 25.0-29.9)  - No history of Diabetes Mellitus    HEME  VTE Low Risk 0.26%            Total Score: 0      - No history of abnormal bleeding or antiplatelet use.  - Chronic iron deficient " anemia- followed by Dr. Eagle. Hgb stable at 10.8  Recommend perioperative use of blood conservation techniques intraoperatively and close monitoring for postoperative bleeding.      MSK  Cervical radiculopathy ( chronic)  Bursitis left hip  Chronic bilateral low back pain  Chronic opioid use    Patient is NOT Frail            Total Score: 0          PSYCH  - depression        The patient is optimized for their procedure. AVS with information on surgery time/arrival time, meds and NPO status given by nursing staff. No further diagnostic testing indicated.      On the day of service:     Prep time: 20 minutes (Additional prep completed day before visit)    Visit time: 20 minutes  Documentation time: 15 minutes  ------------------------------------------  Total time: 55 minutes      NINA Merrill CNP  Preoperative Assessment Center  [facility]  Phone: [phone]  Fax: [phone]

## 2022-09-21 NOTE — PATIENT INSTRUCTIONS
Preparing for Your Surgery      Name:  Nathalie Bashir   MRN:  [id number]   :  1965   Today's Date:  2022       Arriving for surgery:  Surgery date:  22  Arrival time:  05:45 am     Surgeries and procedures: Adult patients can have 2 visitors all through the surgery process.     Visiting hours: 8 a.m. to 8:30 p.m.     Hospital: Adult patients and children under age 18 can have 4 visitor at a time     No visitors under the age of 5 are allowed for hospital patients.  Double occupancy rooms: Patients can have only two visitors at a time.     Patients with disabilities: Can have a support person with them (family member, service provider     Or someone well informed about their needs) plus the allowed number of visitors     Patients confirmed or suspected to have symptoms of COVID 19 or flu:     No visitors allowed for adult patients.   Children (under age 18) can have 1 named visitor.     People who are sick or showing symptoms of COVID 19 or flu:    Are not allowed to visit patients--we can only make exceptions in special situations.       Please follow these guidelines for your visit:   Arrive wearing a mask over your mouth and nose; we will give you a medical mask to wear    If you arrive wearing a cloth mask.   Keep it on during your entire visit, even when in patient's room.   If you don't wear a mask we'll ask you to leave.     Clean your hands with alcohol hand . Do this when you arrive at and leave the building and patient room,    And again after you touch your mask or anything in the room.     You can t visit if you have a fever, cough, shortness of breath, muscle aches, headaches, sore throat    Or diarrhea      Stay 6 feet away from others during your visit and between visits     Go directly to and from the room you are visiting.     Stay in the patient s room during your visit. Limit going to other places in the hospital as much as possible     Leave bags and jackets at home  or in the car.     For everyone s health, please don t come and go during your visit. That includes for smoking   during your visit. That includes for smoking    Please come to:   [facility] - [address] [address]   Parking is available in front of the [facility] building from 5:30AM to 8:00PM.  -  Proceed to the 5th floor to check into the [facility]ulatory Surgery Center.              >> There will be patient concierges on the 1st and 5th floor, for assistance or an escort, if you would like.              >> Please call [phone] with any questions.    What can I eat or drink?  -  You may eat and drink normally for up to 8 hours before your surgery.   -  You may have clear liquids until 4 hours before surgery.    Examples of clear liquids:  Water  Clear broth  Juices (apple, white grape, white cranberry  and cider) without pulp  Noncarbonated, powder based beverages  (lemonade and Deep-Aid)  Sodas (Sprite, 7-Up, ginger ale and seltzer)  Coffee or tea (without milk or cream)  Gatorade    -  No Alcohol for at least 24 hours before surgery.     Which medicines can I take?  Hold Aspirin for 7 days before surgery.   Hold Multivitamins for 7 days before surgery.  Hold Supplements for 7 days before surgery.  Hold Ibuprofen (Advil, Motrin) for 1 day before surgery--unless otherwise directed by surgeon.  Hold Naproxen (Aleve) for 4 days before surgery.  HOLD IMITREX X 24 HOURS BEFORE SURGERY.    -  PLEASE TAKE these medications the day of surgery:  Tylenol if needed; take morning medications.    How do I prepare myself?  - Please take 2 showers before surgery using Scrubcare or Hibiclens soap.    Use this soap only from the neck to your toes.     Leave the soap on your skin for one minute--then rinse thoroughly.      You may use your own shampoo and conditioner. No other hair products.   - Please remove all jewelry and body  piercings.  - No lotions, deodorants or fragrance.  - No makeup or fingernail polish.   - Bring your ID and insurance card.    -If you have a Deep Brain Stimulator, Spinal Cord Stimulator, or any Neuro Stimulator device---you must bring the remote control to the hospital.      ALL PATIENTS GOING HOME THE SAME DAY OF SURGERY ARE REQUIRED TO HAVE A RESPONSIBLE ADULT TO DRIVE AND BE IN ATTENDANCE WITH THEM FOR 24 HOURS FOLLOWING SURGERY.      Questions or Concerns:    - For any questions regarding the day of surgery or your hospital stay, please contact the Pre Admission Nursing Office at [phone].       - If you have health changes between today and your surgery, please call your surgeon.       - For questions after surgery, please call your surgeons office.

## 2022-09-21 NOTE — H&P (VIEW-ONLY)
Pre-Operative H & P     CC:  Preoperative exam to assess for increased cardiopulmonary risk while undergoing surgery and anesthesia.    Date of Encounter: 9/21/2022  Primary Care Physician:  Alban Yuan     Reason for visit:   [facility]  Nathalie Bashir is a 56 year old female who presents for pre-operative H & P in preparation for  Procedure Information     Case: [id number] Date/Time: 09/22/22 0715    Procedure: LEFT WRIST OPEN REDUCTION INTERNAL FIXATION, FRACTURE (Left Wrist)    Anesthesia type: MAC with Regional    Diagnosis: Other closed intra-articular fracture of distal end of left radius, initial encounter [S52.572A]    Pre-op diagnosis: Other closed intra-articular fracture of distal end of left radius, initial encounter [P12.312A]    Location: [place] [address] / [facility]    Providers: Mark Villalobos MD          Dena Bashir is a 56 year old female with a PMH of including gastric adenocarcinoma in remission status post chemotherapy.  She also has a history of a left total hip arthroplasty in 2012 complicated by MRSA infection, requiring 1 revision, migraines, chronic pain, opiod dependence, Crohn's disease, anemia, depression, neuropathy and pylonenephritis.   She sustained a left distal radius fracture on 9/18/2022 when descending the stairs, she fell landing on the floor. Noted at the time was pain and wrist deformity. She was unclear as to what happened, possibly had a pre- syncopal episode. She reports that she has been experiencing low blood pressure recently. Seen in the ED she underwent closed reduction under hematoma block in the emergency department with splinting.  Pain is currently well controlled.  She denies any numbness or tingling.  She consulted with Dr. Villalobos and presents today in preparation for the above procedure.       History is obtained from the patient and chart review    Hx of abnormal bleeding or anti-platelet use: no    Menstrual history:  Patient's last menstrual period was 09/23/2014.:      Past Medical History  Past Medical History:   Diagnosis Date     Allergic rhinitis      Cancer (H)     stomach cancer     Chronic pain      Closed fracture of distal end of left radius      Depression      Gastric adenocarcinoma (H)      Lumbago      Migraine      Opioid dependence (H)      Other chronic pain     low back     Sacroiliitis (H)     low back pain     Trochanteric bursitis     leg length discrrepancy, hip pain       Past Surgical History  Past Surgical History:   Procedure Laterality Date     ARTHROPLASTY HIP Left 2016     BACK SURGERY      L4-5 decompression     CATARACT IOL, RT/LT       CHOLECYSTECTOMY N/A 1/28/2020    Procedure: Cholecystectomy;  Surgeon: Yandel Mallory MD;  [facility]     ENDOSCOPIC RETROGRADE CHOLANGIOPANCREATOGRAM N/A 7/27/2020    Procedure: ENDOSCOPIC RETROGRADE CHOLANGIOPANCREATOGRAPHY  **Latex Allergy** with jejunal biopsies;  Surgeon: Jeet Aviles MD;  Location:  OR     ESOPHAGOSCOPY, GASTROSCOPY, DUODENOSCOPY (EGD), COMBINED N/A 3/3/2020    Procedure: ESOPHAGOGASTRODUODENOSCOPY, WITH ENDOSCOPIC US (enoxaparin);  Surgeon: Bakari Plata MD;  Location:  GI     ESOPHAGOSCOPY, GASTROSCOPY, DUODENOSCOPY (EGD), COMBINED N/A 12/7/2020    Procedure: Upper Endoscopy with stent placement;  Surgeon: Jeet Aviles MD;  Location:  OR     ESOPHAGOSCOPY, GASTROSCOPY, DUODENOSCOPY (EGD), COMBINED N/A 2/8/2021    Procedure: ESOPHAGOGASTRODUODENOSCOPY (EGD)AND STENT REMOVAL;  Surgeon: Jeet Aviles MD;  Location:  OR     EYE SURGERY       GASTRECTOMY N/A 10/21/2020    Procedure: Open subtotal gastectomy with David en Y Reconstruction; D1 Lymph Node Disection  **Latex Allergy**;  Surgeon: Yandel Mallory MD;  [facility]     IR CHEST PORT PLACEMENT > 5 YRS OF AGE  3/26/2020     IR LUMBAR EPIDURAL STEROID INJECTION  8/25/2009     IR LUMBAR EPIDURAL STEROID INJECTION  9/2/2009     IR LUMBAR  EPIDURAL STEROID INJECTION  9/25/2009     IR LUMBAR EPIDURAL STEROID INJECTION  11/23/2009     IR LUMBAR EPIDURAL STEROID INJECTION  4/1/2010     IR LUMBAR EPIDURAL STEROID INJECTION  9/1/2010     IR LUMBAR EPIDURAL STEROID INJECTION  3/10/2011     IR LUMBAR EPIDURAL STEROID INJECTION  8/30/2011     LAPAROSCOPY DIAGNOSTIC (GENERAL) N/A 10/13/2020    Procedure: Diagnostic laparoscopy with peritoneal washings  **Latex Allergy**;  Surgeon: Yandel Mallory MD;  [facility]     OPEN REDUCTION INTERNAL FIXATION RODDING INTRAMEDULLARY FEMUR Left 12/23/2014    Procedure: OPEN REDUCTION INTERNAL FIXATION RODDING INTRAMEDULLARY FEMUR;  Surgeon: Arnol Santiago MD;  Location: [facility] OR     ORTHOPEDIC SURGERY       PICC DOUBLE LUMEN PLACEMENT Right 02/07/2020    5 fr double lumen 41 cm     NV LAMINEC/FACETECT/FORAMIN,LUMBAR 1 SEG      Description: Laminectomy Decompress, Facetectomy, Foraminotomy Lumbar Seg;  Recorded: 04/12/2012;     REMOVE HARDWARE LOWER EXTREMITY Left 4/7/2015    Procedure: REMOVE HARDWARE LOWER EXTREMITY;  Surgeon: Arnol Santiago MD;  [facility]     REMOVE HARDWARE RODDING INTRAMEDULLARY FEMUR Left 12/17/2015    Procedure: REMOVE HARDWARE RODDING INTRAMEDULLARY FEMUR;  Surgeon: Arnol Santiago MD;  [facility]     RESECT BONE LOWER EXTREMITY Left 12/23/2014    Procedure: RESECT BONE LOWER EXTREMITY;  Surgeon: Arnol Santiago MD;  [facility]     SHORTENING OSTEOPLASTY FEMUR W/ IM NAILING       WHIPPLE PROCEDURE N/A 1/28/2020    Procedure: Open Whipple procedure and Cholecystectomy;  Surgeon: Yandel Mallory MD;  [facility]     WHIPPLE PROCEDURE       ZZC FUSION OF SACROILIAC JOINT      Description: Arthrodesis Sacroiliac Joint Left;  Recorded: 10/07/2011;     ZZC REPAIR DETACH RETINA,SCLERAL BUCKLE         Prior to Admission Medications  Current Outpatient Medications   Medication Sig Dispense Refill     amitriptyline (ELAVIL) 75 MG tablet Take 1 tablet (75 mg) by mouth At  Bedtime 30 tablet 3     Cyanocobalamin 1000 MCG CAPS Take 1 tablet by mouth every morning       cyclobenzaprine (FLEXERIL) 10 MG tablet Take 1 tablet (10 mg) by mouth 3 times daily as needed for muscle spasms 90 tablet 3     DULoxetine (CYMBALTA) 30 MG capsule Take 3 capsules (90 mg) by mouth daily (Patient taking differently: Take 60 mg by mouth every morning) 90 capsule 2     famotidine (PEPCID) 20 MG tablet TAKE 1 TABLET (20 MG) BY MOUTH 2 TIMES DAILY AS NEEDED 30 tablet 3     fluticasone (FLONASE) 50 MCG/ACT nasal spray Spray 1 spray into both nostrils daily at 2 pm       hydrOXYzine (VISTARIL) 25 MG capsule Take 25-50 mg by mouth 4 times daily as needed for itching or anxiety       loperamide (IMODIUM) 2 MG capsule Take 4 mg by mouth 4 times daily as needed for diarrhea       loratadine-pseudoePHEDrine (CLARITIN-D 24-HOUR)  MG 24 hr tablet Take 1 tablet by mouth daily as needed for allergies Usually takes in Spring/Fall       ondansetron (ZOFRAN) 8 MG tablet Take 1 tablet (8 mg) by mouth every 8 hours as needed (Nausea/Vomiting) 90 tablet 2     oxyCODONE (ROXICODONE) 5 MG tablet Take 1 tablet (5 mg) by mouth 4 times daily as needed for moderate to severe pain Maximum 4 pills/day. 120 tablet 0     pantoprazole (PROTONIX) 40 MG EC tablet Take 1 tablet (40 mg) by mouth 2 times daily 90 tablet 1     sennosides (SENOKOT) 8.6 MG tablet 1-2 tablets 1-2 times daily as needed. 120 tablet 1     simethicone 80 MG TABS Take 1 tablet by mouth every 6 hours as needed (bloating, gas, belching) 90 tablet 3     SUMAtriptan (IMITREX) 100 MG tablet Take 100 mg by mouth at onset of headache for migraine       estradiol (ESTRACE) 0.1 MG/GM vaginal cream Place 1 g vaginally three times a week (Patient not taking: Reported on 9/21/2022) 42.5 g 3       Allergies  Allergies   Allergen Reactions     Gluten Meal Palpitations     Augmentin Rash     Patient tolerated Zosyn in 7/2020     Pregabalin      interfered with Cymbalta      Topiramate      Tongue numbness, taste alteration, irritable      Acetaminophen Nausea     Urine retention       Dust Mite Extract      Gabapentin Dizziness and GI Disturbance     Ketorolac Headache     Latex Rash     Methadone Fatigue     Mold      Naprosyn [Naproxen] Dizziness and GI Disturbance     Seasonal Allergies        Social History  Social History     Socioeconomic History     Marital status:      Spouse name: Not on file     Number of children: 1     Years of education: Not on file     Highest education level: Not on file   Occupational History     Occupation: retired   Tobacco Use     Smoking status: Former Smoker     Packs/day: 0.50     Years: 25.00     Pack years: 12.50     Types: Cigarettes     Quit date: 2020     Years since quittin.7     Smokeless tobacco: Never Used   Substance and Sexual Activity     Alcohol use: No     Drug use: No     Sexual activity: Not on file   Other Topics Concern     Parent/sibling w/ CABG, MI or angioplasty before 65F 55M? Not Asked   Social History Narrative     Not on file     Social Determinants of Health     Financial Resource Strain: Not on file   Food Insecurity: Not on file   Transportation Needs: Not on file   Physical Activity: Not on file   Stress: Not on file   Social Connections: Not on file   Intimate Partner Violence: Not on file   Housing Stability: Not on file       Family History  Family History   Problem Relation Age of Onset     Heart Failure Mother 77     Dementia Father         frontal lobe     No Known Problems Sister      No Known Problems Brother      Anesthesia Reaction No family hx of      Deep Vein Thrombosis No family hx of        Review of Systems  The complete review of systems is negative other than noted in the HPI or here.   Anesthesia Evaluation   Pt has had prior anesthetic. Type: General and MAC.    No history of anesthetic complications       ROS/MED HX  ENT/Pulmonary:     (+) tobacco use, Past use,     Neurologic:    "  (+) peripheral neuropathy (bilateral hands and feet), - bilateral numbness, tingling pain hands and feet. migraines,     Cardiovascular: Comment: Has mild dizziness recently, can be either be while standing or lying still    (+) -----Previous cardiac testing   Echo: Date: 7/2022 Results:    Stress Test: Date: Results:    ECG Reviewed: Date: 9/19/2022 Results:  Sinus rhythm with sinus arrhythmia   Cath: Date: Results:   (-) GUZMÁN and orthopnea/PND   METS/Exercise Tolerance: 3 - Able to walk 1-2 blocks without stopping Comment: Uses cane for stability s/p hip replacement ( re-do)   Hematologic: Comments: HGB stable 10.8    (+) anemia,     Musculoskeletal: Comment: Cervical radiculopathy ( chronic)  Bursitis left hip  Chronic bilateral low back pain  (+) fracture, Fracture location: LLE (s/p ORIF as child),     GI/Hepatic:     (+) GERD, Asymptomatic on medication, cholecystitis/cholelithiasis,     Renal/Genitourinary:  - neg Renal ROS     Endo:  - neg endo ROS     Psychiatric/Substance Use:     (+) psychiatric history depression H/O chronic opiod use .     Infectious Disease: Comment: H/o MRSA after initial hip surgery- 2013  Vaccinated for covid    (+) MRSA,  (-) Recent Fever   Malignancy: Comment: S/p whipple 2020  (+) Malignancy, History of Other.Other CA Gastric adenocarcinoma Remission status post Surgery and Chemo.    Other:      (+) , H/O Chronic Pain,        /73 (BP Location: Right arm, Patient Position: Sitting, Cuff Size: Adult Regular)   Pulse 107   Temp 97.9  F (36.6  C) (Oral)   Resp 16   Ht 1.727 m (5' 8\")   Wt 81 kg (178 lb 8 oz)   LMP 09/23/2014   SpO2 98%   Breastfeeding No   BMI 27.14 kg/m      Physical Exam   Constitutional: Awake, alert, cooperative, no apparent distress, and appears stated age.  Eyes: Pupils equal, round and reactive to light, extra ocular muscles intact, sclera clear, conjunctiva normal.  HENT: Normocephalic, oral pharynx with moist mucus membranes, good dentition. " No goiter appreciated.   Respiratory: Clear to auscultation bilaterally, no crackles or wheezing.  Cardiovascular: Regular rate and rhythm, normal S1 and S2, and no murmur noted.  Carotids +2, no bruits. No edema. Palpable pulses to radial  DP and PT arteries.   GI: Normal bowel sounds, soft, non-distended, non-tender, no masses palpated, no hepatosplenomegaly.  Surgical scars: healed  Lymph/Hematologic: No cervical lymphadenopathy and no supraclavicular lymphadenopathy.  Genitourinary:  deferred  Skin: Warm and dry.  No rashes at anticipated surgical site.   Musculoskeletal: Full ROM of neck. There is no redness, warmth, or swelling of the joints.  Neurologic: Awake, alert, oriented to name, place and time. Cranial nerves II-XII are grossly intact. Did not observe, she uses cane for balance.   Neuropsychiatric: Calm, cooperative. Normal affect.     Prior Labs/Diagnostic Studies   All labs and imaging personally reviewed         EXAM: XR WRIST LEFT G/E 3 VIEWS  LOCATION: [facility]  DATE/TIME: 9/18/2022 2:37 PM     INDICATION: post reduction  COMPARISON: Examination performed earlier in the day.                                                                      IMPRESSION: Distal radial metaphyseal fracture. Dorsal angulation has been reduced in the interval. Mild dorsal displacement has improved in the interval. Stable radial displacement. Splint is in place    3 views left wrist radiographs 9/19/2022 1:58 PM     History: Wrist pain     Comparison: 9/18/2022     Findings:     PA, oblique and lateral view(s) of the left wrist were obtained. Chest  material obscures underlying bony detail.     Redemonstration impacted comminuted distal radial fracture with  similar alignment. Fracture line remains persistently visible. Trace  ulnar positive variance.     Dorsal soft tissue swelling just distal to the dorsal cast margin.                                                                       IMPRESSION: Redemonstration impacted comminuted distal radial fracture  with similar alignment. Fracture line remains persistently visible.       EKG/ stress test - if available please see in ROS above   7/2022  Echo result w/o MOPS: Interpretation SummaryGlobal and regional left ventricular function is normal with an EF of 60-65%.Right ventricular function, chamber size, wall motion, and thickness arenormal.Pulmonary artery systolic pressure cannot be assessed.The inferior vena cava is normal.No pericardial effusion is present.Previous study not available for comparison.      No flowsheet data found.      The patient's records and results personally reviewed by this provider.     Outside records reviewed from: Care Everywhere    LAB/DIAGNOSTIC STUDIES TODAY:    Lab Results   Component Value Date    WBC 8.8 09/18/2022    WBC 4.8 05/21/2021     Lab Results   Component Value Date    RBC 3.92 09/18/2022    RBC 3.90 05/21/2021     Lab Results   Component Value Date    HGB 12.1 09/18/2022    HGB 12.2 05/21/2021     Lab Results   Component Value Date    HCT 36.9 09/18/2022    HCT 38.2 05/21/2021     No components found for: MCT  Lab Results   Component Value Date    MCV 94 09/18/2022    MCV 98 05/21/2021     Lab Results   Component Value Date    MCH 30.9 09/18/2022    MCH 31.3 05/21/2021     Lab Results   Component Value Date    MCHC 32.8 09/18/2022    MCHC 31.9 05/21/2021     Lab Results   Component Value Date    RDW 14.8 09/18/2022    RDW 13.0 05/21/2021     Lab Results   Component Value Date     09/18/2022     05/21/2021     Last Comprehensive Metabolic Panel:  Sodium   Date Value Ref Range Status   09/18/2022 139 136 - 145 mmol/L Final   05/21/2021 139 133 - 144 mmol/L Final     Potassium   Date Value Ref Range Status   09/18/2022 3.7 3.4 - 5.3 mmol/L Final   08/26/2022 5.0 3.4 - 5.3 mmol/L Final   05/21/2021 4.2 3.4 - 5.3 mmol/L Final     Chloride   Date Value Ref Range Status    09/18/2022 101 98 - 107 mmol/L Final   08/26/2022 110 (H) 94 - 109 mmol/L Final   05/21/2021 107 94 - 109 mmol/L Final     Carbon Dioxide   Date Value Ref Range Status   05/21/2021 29 20 - 32 mmol/L Final     Carbon Dioxide (CO2)   Date Value Ref Range Status   09/18/2022 32 (H) 22 - 29 mmol/L Final   08/26/2022 30 20 - 32 mmol/L Final     Anion Gap   Date Value Ref Range Status   09/18/2022 6 (L) 7 - 15 mmol/L Final   08/26/2022 3 3 - 14 mmol/L Final   05/21/2021 3 3 - 14 mmol/L Final     Glucose   Date Value Ref Range Status   09/18/2022 102 (H) 70 - 99 mg/dL Final   08/26/2022 99 70 - 99 mg/dL Final   05/21/2021 102 (H) 70 - 99 mg/dL Final     Urea Nitrogen   Date Value Ref Range Status   09/18/2022 10.5 6.0 - 20.0 mg/dL Final   08/26/2022 7 7 - 30 mg/dL Final   05/21/2021 12 7 - 30 mg/dL Final     Creatinine   Date Value Ref Range Status   09/18/2022 0.90 0.51 - 0.95 mg/dL Final   05/21/2021 0.93 0.52 - 1.04 mg/dL Final     GFR Estimate   Date Value Ref Range Status   09/18/2022 75 >60 mL/min/1.73m2 Final     Comment:     Effective December 21, 2021 eGFRcr in adults is calculated using the 2021 CKD-EPI creatinine equation which includes age and gender (Allie et al., NEJ, DOI: 10.1056/FHQHzf6976149)   05/21/2021 70 >60 mL/min/[1.73_m2] Final     Comment:     Non  GFR Calc  Starting 12/18/2018, serum creatinine based estimated GFR (eGFR) will be   calculated using the Chronic Kidney Disease Epidemiology Collaboration   (CKD-EPI) equation.       Calcium   Date Value Ref Range Status   09/18/2022 9.0 8.6 - 10.0 mg/dL Final   05/21/2021 8.5 8.5 - 10.1 mg/dL Final         Assessment      Nathalie Bashir is a 56 year old female seen as a PAC referral for risk assessment and optimization for anesthesia.    Plan/Recommendations  Pt will be optimized for the proposed procedure.  See below for details on the assessment, risk, and preoperative recommendations    NEUROLOGY  - No history of TIA, CVA or  "seizure   - Neuropathy, bilateral hands and feet  - Migraines 1-2 monthly- Uses Imitrex  - - Chronic Pain  On chronic opiates, morphine equivilant = 30 MME/Day   -Post Op delirium risk factors:  No risk identified    ENT  - No current airway concerns.  Will need to be reassessed day of surgery.  Mallampati: I  TM: > 3    CARDIAC  - No history of CAD, Hypertension and Afib   No anginal symptoms, Denies palpitations, PND  patient reported experiencing low blood pressure at times. Doesn't check pressures at home.   - light headedness - intermittently occurs when standing or laying down. Denies syncope  - METS (Metabolic Equivalents) uses can for assist for balance after 2nd hip replacement able to walk 1-2 blocks with cane  Patient CANNOT perform 4 METS exercise without symptoms            Total Score: 1    Functional Capacity: Unable to complete 4 METS      RCRI-Very low risk: Class 1 0.4% complication rate            Total Score: 0        PULMONARY    LISHA Low Risk            Total Score: 1    LISHA: Over 50 ys old      - Denies asthma or inhaler use  - Tobacco History      History   Smoking Status     Former Smoker     Packs/day: 0.50     Years: 25.00     Types: Cigarettes     Quit date: 1/1/2020   Smokeless Tobacco     Never Used       GI  - GERD  Controlled on medications: Proton Pump Inhibitor     History of gastric adenocarcinoma, s/p whipple s/p chemotherapy    PONV High Risk  Total Score: 3           1 AN PONV: Pt is Female    1 AN PONV: Patient is not a current smoker    1 AN PONV: Intended Post Op Opioids          ENDOCRINE    - BMI: Estimated body mass index is 27.14 kg/m  as calculated from the following:    Height as of this encounter: 1.727 m (5' 8\").    Weight as of this encounter: 81 kg (178 lb 8 oz).  Overweight (BMI 25.0-29.9)  - No history of Diabetes Mellitus    HEME  VTE Low Risk 0.26%            Total Score: 0      - No history of abnormal bleeding or antiplatelet use.  - Chronic iron deficient " anemia- followed by Dr. Eagle. Hgb stable at 10.8  Recommend perioperative use of blood conservation techniques intraoperatively and close monitoring for postoperative bleeding.      MSK  Cervical radiculopathy ( chronic)  Bursitis left hip  Chronic bilateral low back pain  Chronic opioid use    Patient is NOT Frail            Total Score: 0          PSYCH  - depression        The patient is optimized for their procedure. AVS with information on surgery time/arrival time, meds and NPO status given by nursing staff. No further diagnostic testing indicated.      On the day of service:     Prep time: 20 minutes (Additional prep completed day before visit)    Visit time: 20 minutes  Documentation time: 15 minutes  ------------------------------------------  Total time: 55 minutes      NINA Merrill CNP  Preoperative Assessment Center  [facility]  [facility]  Phone: [phone]  Fax: [phone]

## 2022-09-22 ENCOUNTER — HOSPITAL ENCOUNTER (OUTPATIENT)
Facility: AMBULATORY SURGERY CENTER | Age: 57
Discharge: HOME | End: 2022-09-22
Attending: STUDENT IN AN ORGANIZED HEALTH CARE EDUCATION/TRAINING PROGRAM | Admitting: STUDENT IN AN ORGANIZED HEALTH CARE EDUCATION/TRAINING PROGRAM
Payer: MEDICARE

## 2022-09-22 ENCOUNTER — ANESTHESIA (OUTPATIENT)
Dept: SURGERY | Facility: AMBULATORY SURGERY CENTER | Age: 57
End: 2022-09-22
Payer: MEDICARE

## 2022-09-22 VITALS
SYSTOLIC BLOOD PRESSURE: 101 MMHG | HEART RATE: 96 BPM | WEIGHT: 178 LBS | HEIGHT: 68 IN | RESPIRATION RATE: 18 BRPM | DIASTOLIC BLOOD PRESSURE: 71 MMHG | OXYGEN SATURATION: 98 % | BODY MASS INDEX: 26.98 KG/M2 | TEMPERATURE: 97.5 F

## 2022-09-22 DIAGNOSIS — S52.572A OTHER CLOSED INTRA-ARTICULAR FRACTURE OF DISTAL END OF LEFT RADIUS, INITIAL ENCOUNTER: ICD-10-CM

## 2022-09-22 PROCEDURE — C1713 ANCHOR/SCREW BN/BN,TIS/BN: HCPCS | Mod: LT

## 2022-09-22 PROCEDURE — 25609 OPTX DST RD XART FX/EP SEP3+: CPT | Mod: LT

## 2022-09-22 PROCEDURE — 25609 OPTX DST RD XART FX/EP SEP3+: CPT | Mod: LT | Performed by: STUDENT IN AN ORGANIZED HEALTH CARE EDUCATION/TRAINING PROGRAM

## 2022-09-22 RX ORDER — METOPROLOL TARTRATE 1 MG/ML
1-2 INJECTION, SOLUTION INTRAVENOUS EVERY 5 MIN PRN
Status: DISCONTINUED | OUTPATIENT
Start: 2022-09-22 | End: 2022-09-23 | Stop reason: HOSPADM

## 2022-09-22 RX ORDER — LIDOCAINE HYDROCHLORIDE 20 MG/ML
INJECTION, SOLUTION INFILTRATION; PERINEURAL PRN
Status: DISCONTINUED | OUTPATIENT
Start: 2022-09-22 | End: 2022-09-22

## 2022-09-22 RX ORDER — HYDROMORPHONE HYDROCHLORIDE 1 MG/ML
0.2 INJECTION, SOLUTION INTRAMUSCULAR; INTRAVENOUS; SUBCUTANEOUS EVERY 5 MIN PRN
Status: DISCONTINUED | OUTPATIENT
Start: 2022-09-22 | End: 2022-09-23 | Stop reason: HOSPADM

## 2022-09-22 RX ORDER — SODIUM CHLORIDE, SODIUM LACTATE, POTASSIUM CHLORIDE, CALCIUM CHLORIDE 600; 310; 30; 20 MG/100ML; MG/100ML; MG/100ML; MG/100ML
INJECTION, SOLUTION INTRAVENOUS CONTINUOUS
Status: DISCONTINUED | OUTPATIENT
Start: 2022-09-22 | End: 2022-09-23 | Stop reason: HOSPADM

## 2022-09-22 RX ORDER — ONDANSETRON 4 MG/1
4 TABLET, ORALLY DISINTEGRATING ORAL EVERY 30 MIN PRN
Status: DISCONTINUED | OUTPATIENT
Start: 2022-09-22 | End: 2022-09-23 | Stop reason: HOSPADM

## 2022-09-22 RX ORDER — HEPARIN SODIUM,PORCINE 10 UNIT/ML
5-10 VIAL (ML) INTRAVENOUS
Status: DISCONTINUED | OUTPATIENT
Start: 2022-09-22 | End: 2022-09-23 | Stop reason: HOSPADM

## 2022-09-22 RX ORDER — FENTANYL CITRATE 50 UG/ML
25 INJECTION, SOLUTION INTRAMUSCULAR; INTRAVENOUS EVERY 5 MIN PRN
Status: DISCONTINUED | OUTPATIENT
Start: 2022-09-22 | End: 2022-09-23 | Stop reason: HOSPADM

## 2022-09-22 RX ORDER — HEPARIN SODIUM (PORCINE) LOCK FLUSH IV SOLN 100 UNIT/ML 100 UNIT/ML
5-10 SOLUTION INTRAVENOUS
Status: DISCONTINUED | OUTPATIENT
Start: 2022-09-22 | End: 2022-09-23 | Stop reason: HOSPADM

## 2022-09-22 RX ORDER — FENTANYL CITRATE 50 UG/ML
50 INJECTION, SOLUTION INTRAMUSCULAR; INTRAVENOUS
Status: DISCONTINUED | OUTPATIENT
Start: 2022-09-22 | End: 2022-09-23 | Stop reason: HOSPADM

## 2022-09-22 RX ORDER — LIDOCAINE 40 MG/G
CREAM TOPICAL
Status: DISCONTINUED | OUTPATIENT
Start: 2022-09-22 | End: 2022-09-23 | Stop reason: HOSPADM

## 2022-09-22 RX ORDER — FENTANYL CITRATE 50 UG/ML
25 INJECTION, SOLUTION INTRAMUSCULAR; INTRAVENOUS
Status: DISCONTINUED | OUTPATIENT
Start: 2022-09-22 | End: 2022-09-23 | Stop reason: HOSPADM

## 2022-09-22 RX ORDER — ACETAMINOPHEN 325 MG/1
975 TABLET ORAL ONCE
Status: DISCONTINUED | OUTPATIENT
Start: 2022-09-22 | End: 2022-09-23 | Stop reason: HOSPADM

## 2022-09-22 RX ORDER — CEFAZOLIN SODIUM 2 G/50ML
2 SOLUTION INTRAVENOUS
Status: COMPLETED | OUTPATIENT
Start: 2022-09-22 | End: 2022-09-22

## 2022-09-22 RX ORDER — BACITRACIN ZINC 500 [USP'U]/G
OINTMENT TOPICAL PRN
Status: DISCONTINUED | OUTPATIENT
Start: 2022-09-22 | End: 2022-09-22 | Stop reason: HOSPADM

## 2022-09-22 RX ORDER — PROPOFOL 10 MG/ML
INJECTION, EMULSION INTRAVENOUS CONTINUOUS PRN
Status: DISCONTINUED | OUTPATIENT
Start: 2022-09-22 | End: 2022-09-22

## 2022-09-22 RX ORDER — HYDRALAZINE HYDROCHLORIDE 20 MG/ML
2.5-5 INJECTION INTRAMUSCULAR; INTRAVENOUS EVERY 10 MIN PRN
Status: DISCONTINUED | OUTPATIENT
Start: 2022-09-22 | End: 2022-09-23 | Stop reason: HOSPADM

## 2022-09-22 RX ORDER — OXYCODONE HYDROCHLORIDE 5 MG/1
5 TABLET ORAL EVERY 4 HOURS PRN
Status: DISCONTINUED | OUTPATIENT
Start: 2022-09-22 | End: 2022-09-23 | Stop reason: HOSPADM

## 2022-09-22 RX ORDER — ONDANSETRON 2 MG/ML
4 INJECTION INTRAMUSCULAR; INTRAVENOUS EVERY 30 MIN PRN
Status: DISCONTINUED | OUTPATIENT
Start: 2022-09-22 | End: 2022-09-23 | Stop reason: HOSPADM

## 2022-09-22 RX ORDER — HEPARIN SODIUM,PORCINE 10 UNIT/ML
5-10 VIAL (ML) INTRAVENOUS EVERY 24 HOURS
Status: DISCONTINUED | OUTPATIENT
Start: 2022-09-22 | End: 2022-09-23 | Stop reason: HOSPADM

## 2022-09-22 RX ORDER — OXYCODONE HYDROCHLORIDE 5 MG/1
5 TABLET ORAL EVERY 6 HOURS PRN
Qty: 20 TABLET | Refills: 0 | Status: SHIPPED | OUTPATIENT
Start: 2022-09-22 | End: 2022-09-25

## 2022-09-22 RX ORDER — MAGNESIUM HYDROXIDE 1200 MG/15ML
LIQUID ORAL PRN
Status: DISCONTINUED | OUTPATIENT
Start: 2022-09-22 | End: 2022-09-22 | Stop reason: HOSPADM

## 2022-09-22 RX ORDER — BUPIVACAINE HYDROCHLORIDE 5 MG/ML
INJECTION, SOLUTION EPIDURAL; INTRACAUDAL
Status: COMPLETED | OUTPATIENT
Start: 2022-09-22 | End: 2022-09-22

## 2022-09-22 RX ADMIN — FENTANYL CITRATE 50 MCG: 50 INJECTION, SOLUTION INTRAMUSCULAR; INTRAVENOUS at 07:10

## 2022-09-22 RX ADMIN — LIDOCAINE HYDROCHLORIDE 100 MG: 20 INJECTION, SOLUTION INFILTRATION; PERINEURAL at 07:24

## 2022-09-22 RX ADMIN — PROPOFOL 100 MCG/KG/MIN: 10 INJECTION, EMULSION INTRAVENOUS at 07:24

## 2022-09-22 RX ADMIN — OXYCODONE HYDROCHLORIDE 5 MG: 5 TABLET ORAL at 09:21

## 2022-09-22 RX ADMIN — CEFAZOLIN SODIUM 2 G: 2 SOLUTION INTRAVENOUS at 07:10

## 2022-09-22 RX ADMIN — BUPIVACAINE HYDROCHLORIDE 20 ML: 5 INJECTION, SOLUTION EPIDURAL; INTRACAUDAL at 07:10

## 2022-09-22 RX ADMIN — SODIUM CHLORIDE, SODIUM LACTATE, POTASSIUM CHLORIDE, CALCIUM CHLORIDE: 600; 310; 30; 20 INJECTION, SOLUTION INTRAVENOUS at 06:26

## 2022-09-22 RX ADMIN — HEPARIN SODIUM (PORCINE) LOCK FLUSH IV SOLN 100 UNIT/ML 5 ML: 100 SOLUTION at 09:42

## 2022-09-22 ASSESSMENT — LIFESTYLE VARIABLES: TOBACCO_USE: 1

## 2022-09-22 ASSESSMENT — ENCOUNTER SYMPTOMS: ORTHOPNEA: 0

## 2022-09-22 NOTE — ANESTHESIA POSTPROCEDURE EVALUATION
Patient: Nathalie Bashir    Procedure: Procedure(s):  LEFT WRIST OPEN REDUCTION INTERNAL FIXATION, FRACTURE       Anesthesia Type:  MAC, Peripheral Nerve Block    Note:  Disposition: Outpatient   Postop Pain Control: Uneventful            Sign Out: Well controlled pain   PONV: No   Neuro/Psych: Uneventful            Sign Out: Acceptable/Baseline neuro status   Airway/Respiratory: Uneventful            Sign Out: Acceptable/Baseline resp. status   CV/Hemodynamics: Uneventful            Sign Out: Acceptable CV status; No obvious hypovolemia; No obvious fluid overload   Other NRE: NONE   DID A NON-ROUTINE EVENT OCCUR? No           Last vitals:  Vitals Value Taken Time   /83 09/22/22 1019   Temp 36.4  C (97.5  F) 09/22/22 0939   Pulse 81 09/22/22 1019   Resp 18 09/22/22 0939   SpO2 97 % 09/22/22 1022   Vitals shown include unvalidated device data.    Electronically Signed By: Morteza Newman MD  September 22, 2022  10:54 AM

## 2022-09-22 NOTE — ANESTHESIA CARE TRANSFER NOTE
Patient: Nathalie Bashir    Procedure: Procedure(s):  LEFT WRIST OPEN REDUCTION INTERNAL FIXATION, FRACTURE       Diagnosis: Other closed intra-articular fracture of distal end of left radius, initial encounter [S59.708V]  Diagnosis Additional Information: No value filed.    Anesthesia Type:   No value filed.     Note:    Oropharynx: oropharynx clear of all foreign objects  Level of Consciousness: awake  Oxygen Supplementation: room air    Independent Airway: airway patency satisfactory and stable  Dentition: dentition unchanged  Vital Signs Stable: post-procedure vital signs reviewed and stable  Report to RN Given: handoff report given  Patient transferred to: Phase II    Handoff Report: Identifed the Patient, Identified the Reponsible Provider, Reviewed the pertinent medical history, Discussed the surgical course, Reviewed Intra-OP anesthesia mangement and issues during anesthesia, Set expectations for post-procedure period and Allowed opportunity for questions and acknowledgement of understanding      Vitals:  Vitals Value Taken Time   /51 09/22/22 0900   Temp 36.2  C (97.2  F) 09/22/22 0900   Pulse 88    Resp 14 09/22/22 0900   SpO2 95 % 09/22/22 0900       Electronically Signed By: NINA Drew CRNA  September 22, 2022  9:02 AM

## 2022-09-22 NOTE — ANESTHESIA PROCEDURE NOTES
Brachial plexus Procedure Note    Pre-Procedure   Staff -        Anesthesiologist:  Morteza Newman MD       Performed By: anesthesiologist       Location: pre-op       Procedure Start/Stop Times: 9/22/2022 7:10 AM and 9/22/2022 7:15 AM       Pre-Anesthestic Checklist: patient identified, IV checked, site marked, risks and benefits discussed, informed consent, monitors and equipment checked, pre-op evaluation, at physician/surgeon's request and post-op pain management  Timeout:       Correct Patient: Yes        Correct Procedure: Yes        Correct Site: Yes        Correct Position: Yes        Correct Laterality: Yes        Site Marked: Yes  Procedure Documentation  Procedure: Brachial plexus       Laterality: left       Patient Position: sitting       Skin prep: Chloraprep (supraclavicular approach).       Needle Type: insulated       Needle Gauge: 21.        Needle Length (millimeters): 100        Ultrasound guided       1. Ultrasound was used to identify targeted nerve, plexus, vascular marker, or fascial plane and place a needle adjacent to it in real-time.       2. Ultrasound was used to visualize the spread of anesthetic in close proximity to the above referenced structure.       3. A permanent image is entered into the patient's record.       4. The visualized anatomic structures appeared normal.       5. There were no apparent abnormal pathologic findings.    Assessment/Narrative         The placement was negative for: blood aspirated, painful injection and site bleeding       Paresthesias: No.       Bolus given via needle..        Secured via.        Insertion/Infusion Method: Single Shot       Complications: none    Medication(s) Administered   Bupivacaine 0.5% PF (Infiltration) - Infiltration   20 mL - 9/22/2022 7:10:00 AM  Bupivacaine liposome (Exparel) 1.3% LA inj susp (Infiltration) - Infiltration   10 mL - 9/22/2022 7:10:00 AM  Medication Administration Time: 9/22/2022 7:10 AM     Comments:  R/b/a to  block explained and patient agrees to proceed.  Aspirations negative throughout, patient tolerated procedure without incident.

## 2022-09-22 NOTE — OP NOTE
Patient: Nathalie Bashir  : 1965  MRN: [id number]    DATE OF OPERATION: 2022      OPERATIVE REPORT       PREOPERATIVE DIAGNOSIS:  Left intra-articular distal radius fracture     POSTOPERATIVE DIAGNOSIS:  Left intra-articular distal radius fracture     PROCEDURE:  Open reduction internal fixation left distal radius fracture, > 3 fragments    SURGEON:  Mark Villalobos MD     ASSISTANT(S):  Abdi Gamez MD    ANESTHESIA:  Regional + MAC     IMPLANTS:   [employer] standard volar distal radius plate    ESTIMATED BLOOD LOSS:  5cc    DVT PROPHYLAXIS:  SCDs    TOURNIQUET TIME:  58 minutes    SPECIMENS REMOVED:  None    INTRAOPERATIVE FINDINGS:  Left distal radius fracture with dorsal displacement, dorsal comminution, and intra-articular extension into DRUJ, which was noted to be stable.    COMPLICATIONS:  None    DISPOSITION:  Stable to PACU.     INDICATIONS:  Ms. Nathalie Bashir is a 56 year old female who sustained a distal radius fracture after a mechanical ground-level fall. The patient presented to the clinic. Operative and nonoperative treatment options were discussed with the patient. Given the displacement, risk of further displacement, and concern for functional compromise, the patient elected to proceed with surgery.    Indications for surgery were discussed with the patient in detail. After discussing risks, benefits and alternatives to procedure, the patient elected to proceed with surgical intervention.     The patient understood that risks include, but are not limited to: infection, bleeding, injury to nearby structures (such as nerves, blood vessels, and tendons), nonunion, malunion, hardware failure/irritation or breakage, need for additional surgery, pain, stiffness, scarring, need for rehabilitation, blood clots, pulmonary embolism, stroke, arrhythmia, myocardial infarction, death, and anesthetic complications.  Patient expressed understanding and elected to proceed with surgery.  All questions were answered to her satisfaction.    Consent was obtained for the procedure.     DESCRIPTION OF PROCEDURE IN DETAIL:  The patient was seen in the preoperative care unit. Patient identity, consent, procedure to be performed, and operative site were verified with the patient. The left upper extremity was marked. The regional anesthesia team performed a preoperative block.     The patient was brought to the operating room and placed supine on the operating room table. The left upper extremity was placed on an armboard. SCDs were placed on the bilateral lower extremities. A well-padded tourniquet was placed on the upper arm.     Monitored sedation was provided by the anesthesia team.     The left upper extremity was prepped and draped in the usual sterile fashion. A timeout was performed confirming the correct patient, procedure, operative site, and antibiotics. All were in agreement.    The left upper extremity was exsanguinated with Esmarch and the tourniquet was inflated to 250mm Hg.  A volar approach was made to the distal radius.  A longitudinal incision over the FCR tendon extending 5cm proximal to the proximal wrist wrist crease was made. Careful hemostasis was obtained with bipolar cautery.    Blunt dissection was taken down to the level of FCR tendon. The volar sheath of the FCR was sharply divided.  The palmar cutaneous branch of the median nerve was identified and protected. The FCR tendon was retracted ulnarly and the radial artery was retracted radially. The dorsal sheath of the FCR was sharply incised revealing the FPL.  This was retracted ulnarly. The pronator quadratus was sharply elevated in an ulnar based flap maintaining its full thickness revealing the radius and fracture below. The fracture was cleaned carefully of callus.  A  reduction was manually obtained under fluoroscopy.    A volar locking plate was placed on the distal radius and temporarily held with k-wires.  Appropriate  fracture reduction and plate placement was visualized under fluoroscopy.  The distal locking screws were drilled, measured and placed.     This was then repeated for the proximal screws. Appropriate fracture reduction and hardware placement was visualized. Appropriate screw lengths were noted. No screws were within the radiocarpal or distal radioulnar joints. The provisional k-wires were removed. The wrist was taken through a range of motion.  It was noted that there was no crepitus.      The distal radioulnar joint was stable under fluoroscopy and to manual testing.    The wound was copiously irrigated. The pronator quadratus was repaired with 3-0 ethibod in running locking fashion. The tourniquet was deflated and hemostasis assured. Deep dermal tissue was repaired using 3-0 vicryl in an interrupted buried fashion.  Skin was closed with 4-0 monocryl in running fashion. Steri-strips were placed and a sterile surgical dressing was applied. The left upper extremity was immobilized in a well-padded short-arm splint.    All needle and sponge counts were correct at the end of the procedure. There were no complications.     The patient was awoken from anesthesia and taken to the postoperative recovery unit in stable condition.     I was present and scrubbed for the entire procedure.     POSTOPERATIVE PLAN:  The patient will maintain the splint until the postoperative visit in 2 weeks. The splint will be kept clean and dry. The patient will remain non-weightbearing on the operative extremity. I have discussed and instructed the patient to keep the extremity elevated and work on range of motion exercises of the digits. The patient will be prescribed 20 tablets of oxycodone 5 mg q6h prn pain. X-rays will be obtained at the first postoperative visit out of plaster. The patient will see hand therapy then for a brace and to begin wrist range of motion.    Mark Villalobos MD     Hand, Upper Extremity &  Microvascular Surgery  Department of Orthopedic Surgery  [facility]

## 2022-09-22 NOTE — ANESTHESIA PREPROCEDURE EVALUATION
Anesthesia Pre-Procedure Evaluation    Patient: Nathalie Bashir   MRN: [id number] : 1965        Procedure : Procedure(s):  LEFT WRIST OPEN REDUCTION INTERNAL FIXATION, FRACTURE          Past Medical History:   Diagnosis Date     Allergic rhinitis      Cancer (H)     stomach cancer     Chronic pain      Closed fracture of distal end of left radius      Depression      Gastric adenocarcinoma (H)      Lumbago      Migraine      Opioid dependence (H)      Other chronic pain     low back     Sacroiliitis (H)     low back pain     Trochanteric bursitis     leg length discrrepancy, hip pain      Past Surgical History:   Procedure Laterality Date     ARTHROPLASTY HIP Left 2016     BACK SURGERY      L4-5 decompression     CATARACT IOL, RT/LT       CHOLECYSTECTOMY N/A 2020    Procedure: Cholecystectomy;  Surgeon: Yandel Mallory MD;  [facility]     ENDOSCOPIC RETROGRADE CHOLANGIOPANCREATOGRAM N/A 2020    Procedure: ENDOSCOPIC RETROGRADE CHOLANGIOPANCREATOGRAPHY  **Latex Allergy** with jejunal biopsies;  Surgeon: Jeet Aviles MD;  Location:  OR     ESOPHAGOSCOPY, GASTROSCOPY, DUODENOSCOPY (EGD), COMBINED N/A 3/3/2020    Procedure: ESOPHAGOGASTRODUODENOSCOPY, WITH ENDOSCOPIC US (enoxaparin);  Surgeon: Bakari Plata MD;  Location:  GI     ESOPHAGOSCOPY, GASTROSCOPY, DUODENOSCOPY (EGD), COMBINED N/A 2020    Procedure: Upper Endoscopy with stent placement;  Surgeon: Jeet Aviles MD;  Location:  OR     ESOPHAGOSCOPY, GASTROSCOPY, DUODENOSCOPY (EGD), COMBINED N/A 2021    Procedure: ESOPHAGOGASTRODUODENOSCOPY (EGD)AND STENT REMOVAL;  Surgeon: Jeet Aviles MD;  Location:  OR     EYE SURGERY       GASTRECTOMY N/A 10/21/2020    Procedure: Open subtotal gastectomy with David en Y Reconstruction; D1 Lymph Node Disection  **Latex Allergy**;  Surgeon: Yandel Mallory MD;  [facility]     IR CHEST PORT PLACEMENT > 5 YRS OF AGE  3/26/2020     IR LUMBAR EPIDURAL  STEROID INJECTION  8/25/2009     IR LUMBAR EPIDURAL STEROID INJECTION  9/2/2009     IR LUMBAR EPIDURAL STEROID INJECTION  9/25/2009     IR LUMBAR EPIDURAL STEROID INJECTION  11/23/2009     IR LUMBAR EPIDURAL STEROID INJECTION  4/1/2010     IR LUMBAR EPIDURAL STEROID INJECTION  9/1/2010     IR LUMBAR EPIDURAL STEROID INJECTION  3/10/2011     IR LUMBAR EPIDURAL STEROID INJECTION  8/30/2011     LAPAROSCOPY DIAGNOSTIC (GENERAL) N/A 10/13/2020    Procedure: Diagnostic laparoscopy with peritoneal washings  **Latex Allergy**;  Surgeon: Yandel Mallory MD;  [facility]     OPEN REDUCTION INTERNAL FIXATION RODDING INTRAMEDULLARY FEMUR Left 12/23/2014    Procedure: OPEN REDUCTION INTERNAL FIXATION RODDING INTRAMEDULLARY FEMUR;  Surgeon: Arnol Santiago MD;  Location: [facility]     ORTHOPEDIC SURGERY       PICC DOUBLE LUMEN PLACEMENT Right 02/07/2020    5 fr double lumen 41 cm     AR LAMINEC/FACETECT/FORAMIN,LUMBAR 1 SEG      Description: Laminectomy Decompress, Facetectomy, Foraminotomy Lumbar Seg;  Recorded: 04/12/2012;     REMOVE HARDWARE LOWER EXTREMITY Left 4/7/2015    Procedure: REMOVE HARDWARE LOWER EXTREMITY;  Surgeon: Arnol Santiago MD;  [facility]: [facility]     REMOVE HARDWARE RODDING INTRAMEDULLARY FEMUR Left 12/17/2015    Procedure: REMOVE HARDWARE RODDING INTRAMEDULLARY FEMUR;  Surgeon: Arnol Santiago MD;  [facility]     RESECT BONE LOWER EXTREMITY Left 12/23/2014    Procedure: RESECT BONE LOWER EXTREMITY;  Surgeon: Arnol Santiago MD;  [facility]     SHORTENING OSTEOPLASTY FEMUR W/ IM NAILING       WHIPPLE PROCEDURE N/A 1/28/2020    Procedure: Open Whipple procedure and Cholecystectomy;  Surgeon: Yandel Mallory MD;  [facility]     WHIPPLE PROCEDURE       ZZC FUSION OF SACROILIAC JOINT      Description: Arthrodesis Sacroiliac Joint Left;  Recorded: 10/07/2011;     ZZC REPAIR DETACH RETINA,SCLERAL BUCKLE        Allergies   Allergen Reactions     Gluten Meal Palpitations     Augmentin Rash      Patient tolerated Zosyn in 2020     Oxycontin [Oxycodone] Other (See Comments)     Confusion, loopy, oxycodone is tolerated.    Pt states she tolerates regular oxycodone, cannot take 12 hour oxycontin.       Pregabalin      interfered with Cymbalta     Topiramate      Tongue numbness, taste alteration, irritable      Acetaminophen Nausea     Urine retention       Dust Mite Extract      Gabapentin Dizziness and GI Disturbance     Ketorolac Headache     Latex Rash     Methadone Fatigue     Mold      Naprosyn [Naproxen] Dizziness and GI Disturbance     Seasonal Allergies       Social History     Tobacco Use     Smoking status: Former Smoker     Packs/day: 0.50     Years: 25.00     Pack years: 12.50     Types: Cigarettes     Quit date: 2020     Years since quittin.7     Smokeless tobacco: Never Used   Substance Use Topics     Alcohol use: No      Wt Readings from Last 1 Encounters:   22 80.7 kg (178 lb)        Anesthesia Evaluation   Pt has had prior anesthetic. Type: General and MAC.    No history of anesthetic complications       ROS/MED HX  ENT/Pulmonary:     (+) tobacco use, Past use,     Neurologic:     (+) peripheral neuropathy (bilateral hands and feet), - bilateral numbness, tingling pain hands and feet. migraines,     Cardiovascular: Comment: Has mild dizziness recently, can be either be while standing or lying still    (+) -----Previous cardiac testing   Echo: Date: 2022 Results:    Stress Test: Date: Results:    ECG Reviewed: Date: 2022 Results:  Sinus rhythm with sinus arrhythmia   Cath: Date: Results:   (-) GUZMÁN and orthopnea/PND   METS/Exercise Tolerance: 3 - Able to walk 1-2 blocks without stopping Comment: Uses cane for stability s/p hip replacement ( re-do)   Hematologic: Comments: HGB stable 10.8    (+) anemia,     Musculoskeletal: Comment: Cervical radiculopathy ( chronic)  Bursitis left hip  Chronic bilateral low back pain  (+) fracture, Fracture location: LLE (s/p ORIF as  child),     GI/Hepatic:     (+) GERD, Asymptomatic on medication, cholecystitis/cholelithiasis,     Renal/Genitourinary:  - neg Renal ROS     Endo:  - neg endo ROS     Psychiatric/Substance Use:     (+) psychiatric history depression H/O chronic opiod use .     Infectious Disease: Comment: H/o MRSA after initial hip surgery- 2013  Vaccinated for covid    (+) MRSA,  (-) Recent Fever   Malignancy: Comment: S/p whipple 2020  (+) Malignancy, History of Other.Other CA Gastric adenocarcinoma Remission status post Surgery and Chemo.    Other:      (+) , H/O Chronic Pain,        Physical Exam    Airway  airway exam normal           Respiratory Devices and Support         Dental  no notable dental history         Cardiovascular   cardiovascular exam normal          Pulmonary   pulmonary exam normal                OUTSIDE LABS:  CBC:   Lab Results   Component Value Date    WBC 8.8 09/18/2022    WBC 4.7 08/26/2022    HGB 12.1 09/18/2022    HGB 10.8 (L) 08/26/2022    HCT 36.9 09/18/2022    HCT 33.5 (L) 08/26/2022     09/18/2022     08/26/2022     BMP:   Lab Results   Component Value Date     09/18/2022     08/26/2022    POTASSIUM 3.7 09/18/2022    POTASSIUM 5.0 08/26/2022    CHLORIDE 101 09/18/2022    CHLORIDE 110 (H) 08/26/2022    CO2 32 (H) 09/18/2022    CO2 30 08/26/2022    BUN 10.5 09/18/2022    BUN 7 08/26/2022    CR 0.90 09/18/2022    CR 0.91 08/26/2022     (H) 09/18/2022    GLC 99 08/26/2022     COAGS:   Lab Results   Component Value Date    INR 1.21 (H) 12/07/2020    FIBR 348 07/22/2020     POC:   Lab Results   Component Value Date     (H) 10/25/2020    HCG Negative 10/21/2020     HEPATIC:   Lab Results   Component Value Date    ALBUMIN 4.0 09/18/2022    PROTTOTAL 6.8 09/18/2022    ALT 26 09/18/2022    AST 33 09/18/2022    ALKPHOS 112 (H) 09/18/2022    BILITOTAL 0.5 09/18/2022     OTHER:   Lab Results   Component Value Date    LACT 2.4 (H) 08/21/2020    A1C 5.6 10/21/2020    PETRA  9.0 09/18/2022    PHOS 3.3 08/26/2022    MAG 2.6 (H) 08/26/2022    LIPASE 7 (L) 08/22/2022    AMYLASE 28 (L) 12/07/2020    TSH 4.16 (H) 07/26/2020    T4 1.38 07/26/2020    CRP 59.0 (H) 08/25/2022    SED 27 07/20/2020       Anesthesia Plan    ASA Status:  2   NPO Status:  NPO Appropriate    Anesthesia Type: MAC.     - Reason for MAC: straight local not clinically adequate   Induction: Intravenous.   Maintenance: TIVA.        Consents    Anesthesia Plan(s) and associated risks, benefits, and realistic alternatives discussed. Questions answered and patient/representative(s) expressed understanding.    - Discussed:     - Discussed with:  Patient      - Extended Intubation/Ventilatory Support Discussed: No.      - Patient is DNR/DNI Status: No    Use of blood products discussed: No .     Postoperative Care    Pain management: IV analgesics, Peripheral nerve block (Single Shot), Multi-modal analgesia.   PONV prophylaxis: Background Propofol Infusion     Comments:                Morteza Newman MD

## 2022-09-22 NOTE — INTERVAL H&P NOTE
"I have reviewed the surgical (or preoperative) H&P that is linked to this encounter, and examined the patient. There are no significant changes    Clinical Conditions Present on Arrival:  Clinically Significant Risk Factors Present on Admission                   # Overweight: Estimated body mass index is 26.87 kg/m  as calculated from the following:    Height as of this encounter: 1.734 m (5' 8.25\").    Weight as of this encounter: 80.7 kg (178 lb).       "

## 2022-09-22 NOTE — DISCHARGE INSTRUCTIONS
Procedure Performed: Open reduction internal fixation left distal radius fracture  Attending Surgeon: Mark Villalobos MD  Date: 9/22/2022    DIAGNOSIS  1. Other closed intra-articular fracture of distal end of left radius, initial encounter        MEDICATIONS   Resume all home medications as directed unless otherwise instructed during this hospitalization. If there is any question, double check with your primary care provider.  Start new discharge medications as directed.    Take 1-2 tablets of extra strength Tylenol 500 mg every 6 hours. You may also take ibuprofen 400 mg every 6 hours. You may take these medications at the same time or alternate. Do not exceed 2400 mg ibuprofen per day.    For breakthrough pain use narcotic pain medication as prescribed.    Do not drive or operate machinery while taking narcotic pain medications.   If you are taking other Tylenol containing medicines at home, be sure NOT to exceed 4 gram's (4000 milligrams) of Tylenol per day.   If you are taking pain medications, be sure to take Colace (docusate sodium) as well to prevent constipation. If constipated, try adding another cathartic or enema.  If nausea and vomiting, call the hospital or seek medical attention.    ACTIVITY   Weight bearing: Non-weight bearing to arm.    DIET  Resume same diet prior to your hospital admission.    WOUND   Leave dressing on until you are seen in clinic for your follow up visit.   Watch for signs and symptoms of infection of your wounds including; pain, redness, swelling, drainage or fever.  If you notice any of these symptoms please call or seek medical attention.    Keep wound clean, dry, and intact.  Do not submerge wounds in water until they are healed. No baths, soaking, swimming, or prolonged water exposure for 4 weeks after surgery.    RETURN   Follow-up with Orthopedic Clinic as directed.     Future Appointments   Date Time Provider Department Center   9/28/2022 10:15 AM  [facility]   10/3/2022 11:40 AM Mark Villalobos MD U[facility]   10/3/2022 12:30 PM IsidraFox ventura, OTR [facility]   10/5/2022 10:45 AM BE MA/LPN [facility]   10/31/2022 11:00 AM Yandel Murcia MD EMG [facility]   11/2/2022 12:00 PM Floresita Gill MD [facility] [place]   11/28/2022 10:15 AM  [facility] LAB DRAW [facility]   11/28/2022 10:40 AM [email or web address] CCT [facility]   12/1/2022  3:00 PM Magaly Pollard MD ONA [facility]   1/4/2023  1:45 PM Cailin Velez MD [facility]       Call the [facility] at [phone] during business hours for any symptoms such as:    * Fevers with Temperature greater than 101.5 degrees.   * Pus drainage from wound site.   * Severe pain, not controlled by medication.   * Persistent nausea, vomiting and inablility to tolerate fluids.    If you are receiving care in [place], you may call the Orthopedic clinic at [phone] Option #2.    FOR URGENT PROBLEMS ONLY, after hours or on weekends call the hospital  at [phone] and ask to speak with the orthopedic resident on call.    You may also be seen at our Orthopedic Walk-In clinic that runs 7 days per week from 7a-5p at [address] [place] [address]. You can call the Walk-In Clinic at [phone].    [facility]  Home Care Following Anesthesia  For 24 hours after surgery:  Get plenty of rest.  A responsible adult must stay with you for at least 24 hours after you leave the surgery center.  Do not drive or use heavy equipment.  If you have weakness or tingling, don't drive or use heavy equipment until this feeling goes away.   Do not drink alcohol.   Avoid strenuous or risky activities.  Ask for help when climbing stairs.  You may feel lightheaded.  IF so, sit for a few minutes before standing.  Have someone help you get up.   If you have nausea (feel sick to your stomach): Drink only clear liquids such as apple juice, ginger ale, broth or 7-Up.  Rest  may also help.  Be sure to drink enough fluids.  Move to a regular diet as you feel able.   You may have a slight fever.  Call the doctor if your fever is over 100 F (37.7 C) (taken under the tongue) or lasts longer than 24 hours.  You may have a dry mouth, a sore throat, muscle aches or trouble sleeping. These should go away after 24 hours.  Do not make important or legal decisions.   It is recommended to avoid smoking.               Tips for taking pain medications  To get the best pain relief possible, remember these points:  Take pain medications as directed, before pain becomes severe.  Pain medication can upset your stomach: taking it with food may help.  Constipation is a common side effect of pain medication. Drink plenty of  fluids.  Eat foods high in fiber. Take a stool softener if recommended by your doctor or pharmacist.  Do not drink alcohol, drive or operate machinery while taking pain medications.  Ask about other ways to control pain, such as with heat, ice or relaxation.    Tylenol/Acetaminophen Consumption  To help encourage the safe use of acetaminophen, the makers of TYLENOL  have lowered the maximum daily dose for single-ingredient Extra Strength TYLENOL  (acetaminophen) products sold in the [place] from 8 pills per day (4,000 mg) to 6 pills per day (3,000 mg). The dosing interval has also changed from 2 pills every 4-6 hours to 2 pills every 6 hours.  If you feel your pain relief is insufficient, you may take Tylenol/Acetaminophen in addition to your narcotic pain medication.   Be careful not to exceed 3,000 mg of Tylenol/Acetaminophen in a 24 hour period from all sources.  If you are taking extra strength Tylenol/acetaminophen (500 mg), the maximum dose is 6 tablets in 24 hours.  If you are taking regular strength acetaminophen (325 mg), the maximum dose is 9 tablets in 24 hours.    Call a doctor for any of the following:  Signs of infection (fever, growing tenderness at the surgery site, a large  "amount of drainage or bleeding, severe pain, foul-smelling drainage, redness, swelling).  It has been over 8 to 10 hours since surgery and you are still not able to urinate (pass water).  Headache for over 24 hours.  Numbness, tingling or weakness the day after surgery (if you had spinal anesthesia).  Signs of Covid-19 infection (temperature over 100 degrees, shortness of breath, cough, loss of taste/smell, generalized body aches, persistent headache, chills, sore throat, nausea/vomiting/diarrhea)  Your doctor is:       Dr. Mark Villalobos, Orthopaedics: [phone]               Or dial [phone] and ask for the resident on call for:  Orthopaedics  For emergency care, call the:  [facility] Emergency Department: [phone] (TTY for hearing impaired: [phone])  Information about liposomal bupivacaine (Exparel)    What is Liposomal Bupivacaine?    Liposomal Bupivacaine is a numbing medication that can help you manage your pain after surgery.  This medication is similar to \"novacaine,\" which is often used by the dentist.  Liposomal bupivacaine is released slowly and can help control pain for up to 72 hours.    What is the purpose of Liposomal Bupivacaine?  To manage your pain after surgery  To help you sleep better, take deep breaths, walk more comfortable, and feel up to visiting with others    How is the procedure done?  Liposomal bupivacaine is a medication given by an injection.  It is usually given right before your surgery.  If this is the case, you will be awake or sedated, but you should experience minimal pain during the procedure.  For some people, the injection may be given at the very end of your surgery.  It all depends on the type of surgery and your situation.  The procedure usually takes about 5-15 minutes.  An ultrasound machine will help the anesthesiologist insert it in the right place or the surgeon will inject it under direct vision.   A needle is used to place the numbing medication under your " "skin.  It provides pain relief by numbing the tissue in the area where your surgeon will make the incision.    What can I expect?  You may experience numbness, tingling, or a feeling of heaviness around the area that was injected.  If you experience any of the follow symptoms IMMEDIATELY CALL THE REGIONAL ANESTHESIA PAIN SERVICE:  Numbness or tingling occurs in areas other than around the injection site  Blurry vision  Ringing in your ears  A metallic taste in your mouth    CALL the Regional Anesthesia Pain Service at:  [phone].  Press \"4\" for the  and let the hospital  know that you are having a problem with a nerve block and that you would like to page the \"adult care inpatient pain management/[facility] team\".  From 7 am - 5 pm, page the \"staff\" physician  From 5 pm - 7 am, page the \"resident\" physician (if no response from \"resident\" physician, call the  back and the \"staff\" physician).    You should not receive any other type of numbing medication within 4 days after receiving liposomal bupivacaine unless your anesthesiologist approves.      Post Operative Instructions: Regional Anesthetic for Upper Extremity with Liposomal Bupivacaine  General Information:   Regional anesthesia is when local anesthetic or  numbing  medication is injected around the nerves to anesthetize or  numb  the area supplied by that set of nerves. It is a type of analgesia used to control pain and decreases the need for narcotics following surgery.    Types of Regional Blocks:  Interscalene: A block injected into the neck on the operative shoulder/arm of a patient having shoulder surgery  Supraclavicular: A block injected near the clavicle on the operative shoulder of a patient having elbow, forearm, or hand surgery    Procedure:  The type of anesthesia your doctor used to numb your shoulder or arm will usually not start to wear off for 24-48 hours, but may last as long as 72 hours. You should be " careful during that period, since it is possible to injure your arm without being aware of the injury. While your arm is numb, you should:  Avoid striking or bumping your arm  Avoid extreme hot or cold    Diet:  There are no restrictions on your diet. You should drink plenty of fluids.     Discomfort:  You will have a tingling and prickly sensation in your arm as the feeling begins to return. You can also expect some discomfort. The amount of discomfort is unpredictable, but if you have more pain than can be controlled with pain medication you should notify your physician.     Pain Medications:  Begin taking your oral pain pills before bedtime and during the night to avoid a sudden onset of pain as part of the block wears off.  Do not engage in drinking, driving, or hazardous occupations while taking pain medication.     Stitches:   You may have stitches or special skin closures. You doctor will inform you when to return to the office to have them removed.     Activity:  On the day of surgery you should try to stay in bed with your hand elevated on pillows. You may resume your normal activity after that, wearing a sling for comfort. Contact your physician if you have any of the problems:   Continued numbness or tingling in the arm or hand after 72 hours  Swelling of the fingers or fingers that are cold to the touch  Excessive bleeding or drainage  Severe pain

## 2022-09-22 NOTE — PROGRESS NOTES
Patient received left side Supraclavicular nerve block  with Exparel.  Fentanyl 50mcg and Versed 1mg given. Tolerated procedure well.

## 2022-09-23 DIAGNOSIS — S52.572G OTHER CLOSED INTRA-ARTICULAR FRACTURE OF DISTAL END OF LEFT RADIUS WITH DELAYED HEALING, SUBSEQUENT ENCOUNTER: Primary | ICD-10-CM

## 2022-09-28 DIAGNOSIS — R10.13 ABDOMINAL PAIN, EPIGASTRIC: ICD-10-CM

## 2022-09-28 DIAGNOSIS — C16.3 MALIGNANT NEOPLASM OF PYLORIC ANTRUM (H): ICD-10-CM

## 2022-09-28 RX ORDER — FAMOTIDINE 20 MG/1
20 TABLET, FILM COATED ORAL 2 TIMES DAILY PRN
Qty: 30 TABLET | Refills: 3 | Status: SHIPPED | OUTPATIENT
Start: 2022-09-28 | End: 2022-10-10

## 2022-10-01 NOTE — PROGRESS NOTES
Hand Therapy Initial Evaluation    Current Date:  10/3/2022    Diagnosis: Closed intra-articular fracture of distal end of left radius   DOI: 9/18/22 per patient   DOS: 9/22/22  Procedure: L distal radius ORIF   Post:  1w 4d    Precautions: LUE NWB for 6 weeks     Per Dr. Villalobos on 10/3/22, hand therapy today to obtain zipper brace.  Begin active range of motion.  Nonweightbearing for 6 weeks     Subjective:  Nathalie Bashir is a 56 year old female.    Patient reports symptoms of the left distal radius fx which occurred due to a mechanical fall. Since onset symptoms are Unchanged  General health as reported by patient is good.  Pertinent medical history includes:Cancer, Depression, Implated Device, Migraines/Headaches, Numbness/Tingling, Rheumatoid Arthritis gastric adenocarcinoma in remission s/p chemotherapy, L THR.   Medical allergies:latex.  Surgical history: cancer: stomach.  Medication history: Anti-depressants, Muscle Relaxants.     Current occupation is retired   Job Tasks: Repetitive Tasks    Occupational Profile Information:   Right hand dominant  Prior functional level:  no limitations  Patient reports symptoms of pain, stiffness/loss of motion, weakness/loss of strength and edema  Special tests:  x-ray of left wrist (dorsally displaced intra-articular distal radius fx with dorsal comminution)   Previous treatment: closed reduction in splint,   Barriers include:none  Mobility: Ambulates with aid of cane  Transportation: drives  Currently not working     Functional Outcome Measure:   Upper Extremity Functional Index Score:  SCORE:   Column Totals: /80: 45   (A lower score indicates greater disability.)      Objective:  Pain Level (Scale 0-10)   10/3/2022   At Rest 5/10    With Use 5/10      Pain Description  Date 10/3/2022   Location L wrist (circumferential)    Pain Quality Aching and Sharp   Frequency intermittent or constant     Pain is worst  daytime or nighttime   Exacerbated by  with use    Relieved by  cold and rest   Progression Unchanged      Sensation    B hand neuropathy per patient; no changes noted     Scar    Steri-strips intact; no signs of infection or drainage.     Edema  Mild swelling on the volar aspect of L wrist;     ROM  Pain Report:  -none + mild    ++ moderate    +++ severe     Wrist 10/3/2022 10/3/2022   AROM (PROM) R L   Extension 65 40,  5/10   Flexion 40 28, 5/10    RD 20 18, 5/10    UD 50 35, 5/10    Supination 60 48, 5/10    Pronation WFL  WFL      L Digit II-V: able to make a loose fist and fully extend digits     Thumb 10/3/2022 10/3/2022   AROM (PROM) R L   MP 0/50 0/30   IP 0/60 0/30   RABD 55 55   PABD 57 58   Kapandji Opposition Scale (0-10/10) 10  9     Strength   (Measured in pounds)  Testing Deferred       Assessment:  Patient presents with symptoms consistent with diagnosis of left distal radius fracture, with surgical  Intervention (ORIF).     Patient's limitations or Problem List includes:  Pain, Decreased ROM/motion, Increased edema and Tightness in musculature of the left wrist which interferes with the patient's ability to perform Self Care Tasks (dressing, bathing), Work Tasks, Sleep Patterns, Recreational Activities, Household Chores and Driving  as compared to previous level of function.    Rehab Potential:  Excellent - Return to full activity, no limitations    Patient will benefit from skilled Occupational Therapy to increase ROM, flexibility, and strength and decrease pain, edema and tightness in musculature to return to previous activity level and resume normal daily tasks and to reach their rehab potential.    Barriers to Learning:  No barrier    Communication Issues:  Patient appears to be able to clearly communicate and understand verbal and written communication and follow directions correctly.    Chart Review: Brief history including review of medical and/or therapy records relating to the presenting problem    Identified Performance Deficits: bathing/showering,  dressing, functional mobility, driving and community mobility, home establishment and management, meal preparation and cleanup, shopping, work and leisure activities    Assessment of Occupational Performance:  5 or more Performance Deficits    Clinical Decision Making (Complexity): Low complexity    Treatment Explanation:  The following has been discussed with the patient:  RX ordered/plan of care  Anticipated outcomes  Possible risks and side effects    Plan:  Frequency:  1 X week, once daily  Duration:  for 8 weeks    Treatment Plan:    Modalities:    Paraffin   Therapeutic Exercise:    AROM, AAROM, PROM, Tendon Gliding, Blocking, Reverse Blocking, Place and Hold, Isotonics, Isometrics and Stabilization  Neuromuscular re-ed:   Proprioceptive Training, Posture, Isometrics and Stabilization  Manual Techniques:   Joint mobilization, Scar mobilization, Myofascial release and Manual edema mobilization  Orthotic Fabrication:    Static and Forearm based  Self Care:    Self Care Tasks, Ergonomic Considerations and Work Tasks    Discharge Plan:    Achieve all LTG.  Independent in home treatment program.  Reach maximal therapeutic benefit.    Home Program:   PTRX    Next Visit:  Check splint fit   Scar mob   STM   Cont edema management

## 2022-10-03 ENCOUNTER — THERAPY VISIT (OUTPATIENT)
Dept: OCCUPATIONAL THERAPY | Facility: CLINIC | Age: 57
End: 2022-10-03
Payer: MEDICARE

## 2022-10-03 ENCOUNTER — ANCILLARY PROCEDURE (OUTPATIENT)
Dept: GENERAL RADIOLOGY | Facility: CLINIC | Age: 57
End: 2022-10-03
Attending: STUDENT IN AN ORGANIZED HEALTH CARE EDUCATION/TRAINING PROGRAM
Payer: MEDICARE

## 2022-10-03 ENCOUNTER — OFFICE VISIT (OUTPATIENT)
Dept: ORTHOPEDICS | Facility: CLINIC | Age: 57
End: 2022-10-03
Payer: MEDICARE

## 2022-10-03 DIAGNOSIS — M25.532 LEFT WRIST PAIN: ICD-10-CM

## 2022-10-03 DIAGNOSIS — S52.572G OTHER CLOSED INTRA-ARTICULAR FRACTURE OF DISTAL END OF LEFT RADIUS WITH DELAYED HEALING, SUBSEQUENT ENCOUNTER: Primary | ICD-10-CM

## 2022-10-03 DIAGNOSIS — S52.572D OTHER CLOSED INTRA-ARTICULAR FRACTURE OF DISTAL END OF LEFT RADIUS WITH ROUTINE HEALING, SUBSEQUENT ENCOUNTER: ICD-10-CM

## 2022-10-03 DIAGNOSIS — S52.572G OTHER CLOSED INTRA-ARTICULAR FRACTURE OF DISTAL END OF LEFT RADIUS WITH DELAYED HEALING, SUBSEQUENT ENCOUNTER: ICD-10-CM

## 2022-10-03 PROCEDURE — 99024 POSTOP FOLLOW-UP VISIT: CPT | Performed by: STUDENT IN AN ORGANIZED HEALTH CARE EDUCATION/TRAINING PROGRAM

## 2022-10-03 PROCEDURE — 97760 ORTHOTIC MGMT&TRAING 1ST ENC: CPT | Mod: GO

## 2022-10-03 PROCEDURE — 97110 THERAPEUTIC EXERCISES: CPT | Mod: GO

## 2022-10-03 PROCEDURE — 73110 X-RAY EXAM OF WRIST: CPT | Mod: LT | Performed by: RADIOLOGY

## 2022-10-03 PROCEDURE — 97165 OT EVAL LOW COMPLEX 30 MIN: CPT | Mod: GO

## 2022-10-03 NOTE — PROGRESS NOTES
Orthopaedic Surgery Hand Clinic Progress Note    Patient Name: Nathalie Bashir  MRN: [id number]  : 1965  Date: Oct 3, 2022    Date of Surgery: 22    Surgery Performed: ORIF left distal radius    Subjective:  Ms. Nathalie Bashir is a 56 year old female who returns 11 days s/p above. Doing well. Pain improving. On oxycodone at baseline for chronic pain. No new numbness/tingling.    Objective:  LMP 2014     General: alert, appropriate, NAD  HEENT: NC/AT  CV: RRR by pulse  Pulm: Unlabored on RA  MSK:  Incision c/d/i  No erythema,, drainage, evidence of infection  5 out of 5 EPL, FPL, and intrinsics  Sensation intact to light touch in the median, radial, ulnar nerve distributions  Warm well-perfused, capillary refill less than 2 seconds    Imaging:  X-rays of left wrist reviewed by me demonstrates postsurgical changes status post surgical fixation of left distal radius with maintenance of reduction and hardware in appropriate position    Assessment/Plan:  56-year-old female status post ORIF left distal radius 2022.  Neurovascular intact.  Progressing appropriately.    New Steri-Strips applied today.  Okay to shower.  Gentle soap and water, pat dry.  No scrubbing, picking or scratching.  No prolonged water exposure or soaking for 4 weeks postoperatively.    To see hand therapy today to obtain zipper brace.  Begin active range of motion.  Nonweightbearing for 6 weeks postoperatively    Follow-up at the 6-week postop jimbo with x-rays of the left wrist out of splint.    All questions answered.  She voiced understanding and agreement.    Mark Villalobos MD    Hand, Upper Extremity & Microvascular Surgery  Department of Orthopedic Surgery  [facility]

## 2022-10-03 NOTE — PROGRESS NOTES
[facility]    OUTPATIENT Occupational Therapy ORTHOPEDIC EVALUATION  PLAN OF TREATMENT FOR OUTPATIENT REHABILITATION  (COMPLETE FOR INITIAL CLAIMS ONLY)  Patient's Last Name, First Name, M.I.  YOB: 1965  Nathalie Bashir    Provider s Name:  [facility]   Medical Record No.  [id number]   Start of Care Date:  10/03/22   Onset Date:  09/22/2209/18/22 (per patient)   Type:     ___PT   __x_OT Medical Diagnosis:    Encounter Diagnosis   Name Primary?    Left wrist pain         Treatment Diagnosis:  Left distal radius ORIF        Goals:     10/03/22 0500   Goal #1   Goal #1 hygiene   Previous Performance Level Independent   Current Functional Task Other - on additional line   Other Hygiene wash   Current Performance Level 5/10 pain   STG Target Performance Hair   STG Target Perform Level 2/10 pain   Due Date 10/31/22   LTG Target Task/Performance Pain free hygiene/grooming   Due Date 11/28/22       Therapy Frequency:  1x/week  Predicted Duration of Therapy Intervention:  8 weeks    WIL Lucas                 I CERTIFY THE NEED FOR THESE SERVICES FURNISHED UNDER        THIS PLAN OF TREATMENT AND WHILE UNDER MY CARE     (Physician attestation of this document indicates review and certification of the therapy plan).                     Certification Date From:  10/03/22   Certification Date To:  11/28/22    Referring Provider:  Mark Villalobos    Initial Assessment        See Epic Evaluation SOC Date: 10/03/22

## 2022-10-03 NOTE — LETTER
10/3/2022         RE: Nathalie Bashir  [address] [address]        Dear Colleague,    Thank you for referring your patient, Nathalie Bashir, to the [facility] [place]. Please see a copy of my visit note below.    Orthopaedic Surgery Hand Clinic Progress Note    Patient Name: Nathalie Bashir  MRN: [id number]  : 1965  Date: Oct 3, 2022    Date of Surgery: 22    Surgery Performed: ORIF left distal radius    Subjective:  Ms. Nathalie Bashir is a 56 year old female who returns 11 days s/p above. Doing well. Pain improving. On oxycodone at baseline for chronic pain. No new numbness/tingling.    Objective:  LMP 2014     General: alert, appropriate, NAD  HEENT: NC/AT  CV: RRR by pulse  Pulm: Unlabored on RA  MSK:  Incision c/d/i  No erythema,, drainage, evidence of infection  5 out of 5 EPL, FPL, and intrinsics  Sensation intact to light touch in the median, radial, ulnar nerve distributions  Warm well-perfused, capillary refill less than 2 seconds    Imaging:  X-rays of left wrist reviewed by me demonstrates postsurgical changes status post surgical fixation of left distal radius with maintenance of reduction and hardware in appropriate position    Assessment/Plan:  56-year-old female status post ORIF left distal radius 2022.  Neurovascular intact.  Progressing appropriately.    New Steri-Strips applied today.  Okay to shower.  Gentle soap and water, pat dry.  No scrubbing, picking or scratching.  No prolonged water exposure or soaking for 4 weeks postoperatively.    To see hand therapy today to obtain zipper brace.  Begin active range of motion.  Nonweightbearing for 6 weeks postoperatively    Follow-up at the 6-week postop jimbo with x-rays of the left wrist out of splint.    All questions answered.  She voiced understanding and agreement.    Mark Villalobos MD    Hand, Upper Extremity & Microvascular Surgery  Department of  Orthopedic Surgery  [facility]

## 2022-10-03 NOTE — NURSING NOTE
Reason For Visit:   Chief Complaint   Patient presents with     Surgical Followup     11 d POP left wrisr ORIF DOS: 9/22/22       Primary MD: Alban Yuan  Ref. MD: [email or web address]    Age: 56 year old    ?  No      LMP 09/23/2014       Pain Assessment  Patient Currently in Pain: No (Some left wrist pain with movement)    Hand Dominance Evaluation  Hand Dominance: Right          QuickDASH Assessment  QuickDASH Main 9/28/2022   1. Open a tight or new jar. Unable   2. Do heavy household chores (e.g., wash walls, floors) Unable   3. Carry a shopping bag or briefcase. Unable   4. Wash your back. Unable   5. Use a knife to cut food. Unable   6. Recreational activities in which you take some force or impact through your arm, shoulder or hand (e.g., golf, hammering, tennis, etc.). Unable   7. During the past week, to what extent has your arm, shoulder or hand problem interfered with your normal social activities with family, friends, neighbours or groups? Moderately   8. During the past week, were you limited in your work or other regular daily activities as a result of your arm, shoulder or hand problem? Unable   9. Arm, shoulder or hand pain. Severe   10.Tingling (pins and needles) in your arm,shoulder or hand. Moderate   11. During the past week, how much difficulty have you had sleeping because of the pain in your arm, shoulder or hand? Moderate difficulty   Quickdash Ability Score 84.09          Current Outpatient Medications   Medication Sig Dispense Refill     amitriptyline (ELAVIL) 75 MG tablet Take 1 tablet (75 mg) by mouth At Bedtime 30 tablet 3     Cyanocobalamin 1000 MCG CAPS Take 1 tablet by mouth every morning       cyclobenzaprine (FLEXERIL) 10 MG tablet Take 1 tablet (10 mg) by mouth 3 times daily as needed for muscle spasms 90 tablet 3     DULoxetine (CYMBALTA) 30 MG capsule Take 3 capsules (90 mg) by mouth daily (Patient taking differently: Take 60 mg by mouth every morning) 90 capsule 2     famotidine  (PEPCID) 20 MG tablet TAKE 1 TABLET (20 MG) BY MOUTH 2 TIMES DAILY AS NEEDED 30 tablet 3     fluticasone (FLONASE) 50 MCG/ACT nasal spray Spray 1 spray into both nostrils daily at 2 pm       hydrOXYzine (VISTARIL) 25 MG capsule Take 25-50 mg by mouth 4 times daily as needed for itching or anxiety       loperamide (IMODIUM) 2 MG capsule Take 4 mg by mouth 4 times daily as needed for diarrhea       loratadine-pseudoePHEDrine (CLARITIN-D 24-HOUR)  MG 24 hr tablet Take 1 tablet by mouth daily as needed for allergies Usually takes in Spring/Fall       ondansetron (ZOFRAN) 8 MG tablet Take 1 tablet (8 mg) by mouth every 8 hours as needed (Nausea/Vomiting) 90 tablet 2     oxyCODONE (ROXICODONE) 5 MG tablet Take 1 tablet (5 mg) by mouth 4 times daily as needed for moderate to severe pain Maximum 4 pills/day. 120 tablet 0     pantoprazole (PROTONIX) 40 MG EC tablet Take 1 tablet (40 mg) by mouth 2 times daily 90 tablet 1     sennosides (SENOKOT) 8.6 MG tablet 1-2 tablets 1-2 times daily as needed. 120 tablet 1     simethicone 80 MG TABS Take 1 tablet by mouth every 6 hours as needed (bloating, gas, belching) 90 tablet 3     SUMAtriptan (IMITREX) 100 MG tablet Take 100 mg by mouth at onset of headache for migraine         Allergies   Allergen Reactions     Gluten Meal Palpitations     Augmentin Rash     Patient tolerated Zosyn in 7/2020     Oxycontin [Oxycodone] Other (See Comments)     Confusion, loopy, oxycodone is tolerated.    Pt states she tolerates regular oxycodone, cannot take 12 hour oxycontin.       Pregabalin      interfered with Cymbalta     Topiramate      Tongue numbness, taste alteration, irritable      Acetaminophen Nausea     Urine retention       Dust Mite Extract      Gabapentin Dizziness and GI Disturbance     Ketorolac Headache     Latex Rash     Methadone Fatigue     Mold      Naprosyn [Naproxen] Dizziness and GI Disturbance     Seasonal Allergies        Mira Shannon, AEMT

## 2022-10-04 DIAGNOSIS — S52.572G OTHER CLOSED INTRA-ARTICULAR FRACTURE OF DISTAL END OF LEFT RADIUS WITH DELAYED HEALING, SUBSEQUENT ENCOUNTER: Primary | ICD-10-CM

## 2022-10-05 ENCOUNTER — IMMUNIZATION (OUTPATIENT)
Dept: FAMILY MEDICINE | Facility: CLINIC | Age: 57
End: 2022-10-05
Payer: MEDICARE

## 2022-10-05 DIAGNOSIS — M79.2 NEUROPATHIC PAIN: ICD-10-CM

## 2022-10-05 DIAGNOSIS — Z23 ENCOUNTER FOR IMMUNIZATION: Primary | ICD-10-CM

## 2022-10-05 DIAGNOSIS — G89.3 CHRONIC PAIN DUE TO NEOPLASM: ICD-10-CM

## 2022-10-05 PROCEDURE — 90682 RIV4 VACC RECOMBINANT DNA IM: CPT

## 2022-10-05 PROCEDURE — 91313 COVID-19,PF,MODERNA BIVALENT: CPT

## 2022-10-05 PROCEDURE — 0134A COVID-19,PF,MODERNA BIVALENT: CPT

## 2022-10-05 PROCEDURE — 99207 PR NO CHARGE LOS: CPT

## 2022-10-05 PROCEDURE — G0008 ADMIN INFLUENZA VIRUS VAC: HCPCS

## 2022-10-05 NOTE — PROGRESS NOTES
Clinic Administered Medication Documentation        Covid moderna booster  Flu shot    Consent form on file  VIS/screening given to patient  Answered all no     Immunizations given per: RAHEEL Gill LPN

## 2022-10-05 NOTE — TELEPHONE ENCOUNTER
Received voicemail from patient requesting refill of oxycodone.     Last refill: 9/12/22  Last office visit: 7/27/22  Scheduled for follow up 11/2/22     Will route request to MD for review.     Reviewed [place]  Report.    Pt due for a refill on 10/10, however is leaving town for a wedding on 10/7/22.

## 2022-10-06 RX ORDER — OXYCODONE HYDROCHLORIDE 5 MG/1
5 TABLET ORAL 4 TIMES DAILY PRN
Qty: 120 TABLET | Refills: 0 | Status: SHIPPED | OUTPATIENT
Start: 2022-10-07 | End: 2022-10-31

## 2022-10-07 DIAGNOSIS — R10.13 ABDOMINAL PAIN, EPIGASTRIC: ICD-10-CM

## 2022-10-07 DIAGNOSIS — C16.3 MALIGNANT NEOPLASM OF PYLORIC ANTRUM (H): ICD-10-CM

## 2022-10-08 DIAGNOSIS — D49.0 IPMN (INTRADUCTAL PAPILLARY MUCINOUS NEOPLASM): ICD-10-CM

## 2022-10-08 DIAGNOSIS — R10.13 ABDOMINAL PAIN, EPIGASTRIC: ICD-10-CM

## 2022-10-08 DIAGNOSIS — C16.3 MALIGNANT NEOPLASM OF PYLORIC ANTRUM (H): ICD-10-CM

## 2022-10-10 RX ORDER — FAMOTIDINE 20 MG/1
20 TABLET, FILM COATED ORAL 2 TIMES DAILY PRN
Qty: 60 TABLET | Refills: 3 | Status: SHIPPED | OUTPATIENT
Start: 2022-10-10 | End: 2023-01-16

## 2022-10-10 RX ORDER — PANTOPRAZOLE SODIUM 40 MG/1
TABLET, DELAYED RELEASE ORAL
Qty: 90 TABLET | Refills: 1 | Status: SHIPPED | OUTPATIENT
Start: 2022-10-10 | End: 2022-12-12

## 2022-10-13 ENCOUNTER — THERAPY VISIT (OUTPATIENT)
Dept: OCCUPATIONAL THERAPY | Facility: CLINIC | Age: 57
End: 2022-10-13
Attending: STUDENT IN AN ORGANIZED HEALTH CARE EDUCATION/TRAINING PROGRAM
Payer: MEDICARE

## 2022-10-13 DIAGNOSIS — M25.532 LEFT WRIST PAIN: Primary | ICD-10-CM

## 2022-10-13 DIAGNOSIS — S52.572G OTHER CLOSED INTRA-ARTICULAR FRACTURE OF DISTAL END OF LEFT RADIUS WITH DELAYED HEALING, SUBSEQUENT ENCOUNTER: ICD-10-CM

## 2022-10-13 PROCEDURE — 97763 ORTHC/PROSTC MGMT SBSQ ENC: CPT | Mod: GO | Performed by: OCCUPATIONAL THERAPIST

## 2022-10-17 ENCOUNTER — NURSE TRIAGE (OUTPATIENT)
Dept: UROLOGY | Facility: CLINIC | Age: 57
End: 2022-10-17

## 2022-10-17 DIAGNOSIS — N39.0 RECURRENT UTI: Primary | ICD-10-CM

## 2022-10-17 NOTE — TELEPHONE ENCOUNTER
[facility] Call Center    Phone Message    May a detailed message be left on voicemail: yes     Reason for Call: Pt called saying she would like Dr Velez to put in an order for UTI symptoms. Pt says there is an odor and cloudiness to her urine. She states she also feels bloated. Please review and reach out to patient     Action Taken: Message routed to:  [facility] ([facility] Urology    Travel Screening: Not Applicable

## 2022-10-18 NOTE — TELEPHONE ENCOUNTER
Nurse Triage SBAR    Is this a 2nd Level Triage? NO    Situation: Spoke to pt. Pt reports that she has a UTI.     Background: History of recurrent UTI.     Assessment: Reports increased urgency and frequency. Feeling sore and bloated. Urine is cloudy and has foul odor. Urine is yellow. No hematuria. No fever. Dysuria rated 4/10. Lower back was hurting, but that has improved since symptoms started over the weekend. Drinking about 4-6 8 ounce glasses of water daily. Using heating pad for relief.     Protocol Recommended Disposition:   See in Office Today    Recommendation: UA/UC.   Increase fluid intake to at least 8 glasses of water daily.   AZO as needed.      .    Does the patient meet one of the following criteria for ADS visit consideration? No     Jessica Wray RN MSN    Orders Placed This Encounter   Procedures     Routine UA with microscopic - No culture     Standing Status:   Future     Standing Expiration Date:   10/18/2023     Urine Culture Aerobic Bacterial     Standing Status:   Future     Standing Expiration Date:   10/18/2023         No orders of the defined types were placed in this encounter.        Reason for Disposition    Urinating more frequently than usual (i.e., frequency)    Protocols used: URINARY SYMPTOMS-A-OH

## 2022-10-19 ENCOUNTER — LAB (OUTPATIENT)
Dept: LAB | Facility: CLINIC | Age: 57
End: 2022-10-19
Payer: MEDICARE

## 2022-10-19 DIAGNOSIS — N39.0 RECURRENT UTI: ICD-10-CM

## 2022-10-19 LAB
ALBUMIN UR-MCNC: NEGATIVE MG/DL
APPEARANCE UR: ABNORMAL
BACTERIA #/AREA URNS HPF: ABNORMAL /HPF
BILIRUB UR QL STRIP: NEGATIVE
COLOR UR AUTO: YELLOW
GLUCOSE UR STRIP-MCNC: NEGATIVE MG/DL
HGB UR QL STRIP: ABNORMAL
KETONES UR STRIP-MCNC: NEGATIVE MG/DL
LEUKOCYTE ESTERASE UR QL STRIP: ABNORMAL
NITRATE UR QL: POSITIVE
PH UR STRIP: 6 [PH] (ref 5–7)
RBC #/AREA URNS AUTO: ABNORMAL /HPF
SP GR UR STRIP: 1.02 (ref 1–1.03)
SQUAMOUS #/AREA URNS AUTO: ABNORMAL /LPF
UROBILINOGEN UR STRIP-ACNC: 0.2 E.U./DL
WBC #/AREA URNS AUTO: >100 /HPF

## 2022-10-19 PROCEDURE — 87086 URINE CULTURE/COLONY COUNT: CPT

## 2022-10-19 PROCEDURE — 81001 URINALYSIS AUTO W/SCOPE: CPT

## 2022-10-19 PROCEDURE — 87186 SC STD MICRODIL/AGAR DIL: CPT

## 2022-10-21 ENCOUNTER — PRE VISIT (OUTPATIENT)
Dept: SURGERY | Facility: CLINIC | Age: 57
End: 2022-10-21

## 2022-10-21 DIAGNOSIS — N39.0 URINARY TRACT INFECTION: Primary | ICD-10-CM

## 2022-10-21 LAB — BACTERIA UR CULT: ABNORMAL

## 2022-10-21 RX ORDER — NITROFURANTOIN 25; 75 MG/1; MG/1
100 CAPSULE ORAL 2 TIMES DAILY
Qty: 10 CAPSULE | Refills: 0 | Status: SHIPPED | OUTPATIENT
Start: 2022-10-21 | End: 2022-10-26

## 2022-10-31 ENCOUNTER — MYC MEDICAL ADVICE (OUTPATIENT)
Dept: OCCUPATIONAL THERAPY | Facility: CLINIC | Age: 57
End: 2022-10-31

## 2022-10-31 DIAGNOSIS — G89.3 CHRONIC PAIN DUE TO NEOPLASM: ICD-10-CM

## 2022-10-31 DIAGNOSIS — M79.2 NEUROPATHIC PAIN: ICD-10-CM

## 2022-10-31 RX ORDER — OXYCODONE HYDROCHLORIDE 5 MG/1
5 TABLET ORAL 4 TIMES DAILY PRN
Qty: 120 TABLET | Refills: 0 | Status: SHIPPED | OUTPATIENT
Start: 2022-11-03 | End: 2022-11-28

## 2022-10-31 NOTE — TELEPHONE ENCOUNTER
Received voicemail from patient requesting refill of oxycodone.     Last refill: 10/7/22  Last office visit: 7/27/22  Scheduled for follow up 11/2/22     Will route request to MD for review.     Reviewed [place]  Report.

## 2022-11-02 ENCOUNTER — VIRTUAL VISIT (OUTPATIENT)
Dept: ONCOLOGY | Facility: CLINIC | Age: 57
End: 2022-11-02
Attending: STUDENT IN AN ORGANIZED HEALTH CARE EDUCATION/TRAINING PROGRAM
Payer: MEDICARE

## 2022-11-02 ENCOUNTER — TELEPHONE (OUTPATIENT)
Dept: ONCOLOGY | Facility: CLINIC | Age: 57
End: 2022-11-02

## 2022-11-02 DIAGNOSIS — G89.3 CHRONIC PAIN DUE TO NEOPLASM: ICD-10-CM

## 2022-11-02 DIAGNOSIS — M79.2 NEUROPATHIC PAIN: ICD-10-CM

## 2022-11-02 PROCEDURE — 99214 OFFICE O/P EST MOD 30 MIN: CPT | Mod: 24 | Performed by: HOSPITALIST

## 2022-11-02 RX ORDER — DULOXETIN HYDROCHLORIDE 30 MG/1
60 CAPSULE, DELAYED RELEASE ORAL EVERY MORNING
COMMUNITY
Start: 2022-11-02 | End: 2023-02-15

## 2022-11-02 NOTE — LETTER
"    11/2/2022         RE: Nathalie Bashir  [address] [address]        Dear Colleague,    Thank you for referring your patient, Nathalie Bashir, to the [facility]. Please see a copy of my visit note below.    Nathalie is a 57 year old who is being evaluated via a billable video visit.      How would you like to obtain your AVS? MyChart  If the video visit is dropped, the invitation should be resent by: Text to cell phone: [phone]  Will anyone else be joining your video visit? No     Ana Paula Mic         Palliative Care Outpatient Clinic Progress Note    Patient Name: Nathalie Bashir is being evaluated via a billable video visit.    Primary Provider:  Alban Yuan  Primary Oncologist: Dr Pollard  Last seen by palliative care: 7/27/22 by Dr Millard      Chief Complaint: Neuropathy    Background/Summary  Medical:  57 yo F with gastric adenocarcinoma              - Found during [facility] 1/2020 for presumed IPMN - no pancreatic malignancy found, but had poorly differentiated adenoca.  Received neoadjuvant chemo (5FU, leucovorin, oxaliplatin, docetaxel) with curative-intent subtotal gastrectomy, David-en-Y gastrojejunostomy 10/2020.  Required stenting of the jejunum for stenosis causing pain, removed 2/8/21.   - on surveillance with scans every 6 months     Chronic and chemo-induced neuropathy - intol of gabapentin, pregabalin, duloxetine somewhat effective, on TID/QID oxycodone. Has been seen by neurology, also h/o \"spinal cord issue\" that predates her cancer diagnosis    Distal Radius fracture Sept 2022 s/p ORIF, working with hand therapy    Social  Lives with her . He had bypass surgery earlier this week. He is expected to come home by the end of the week. Their son and friends will help.   Son, 3 GCs  Retired from special ed    Interim History:  At the last visit they discussed her painful neuropathy (resumed higher dose of cymbalta, ongoing " "appointments with PM&R, prn oxycodone), nausea (no change, on zofran), muscle spasms (cont flexeril)    Patient is on the call by herself  History gathered today from: patient, medical chart    Her neuropathy in her legs has acted up lately. She is wondering whether the change in seasons is related to this.   She has spasms again. Flexeril has been somewhat helpful.   She tried taking duloxetine at 90mg daily, didn't notice a big change and is now back at 60mg daily.   She takes oxycodone with some relief, takes the allowed 4x5mg pretty consistently.     She broke her arm recently, fell when she was carrying things down the stairs and her ankle \"gave out\". In further conversation it sounds that she lost her balance.    I discuss the importance of engaging with PT in order to address issues with balance. We also discuss the increasing fall risk with combining several sedating medications. She is uncertain as to whether she feels ready to make any changes, but does agree to re-establish care with neurology and PM&R once her  has recovered.   She has been on buprenorphine in the past, patch, and didn't appreciate how it made her feel.        Impression & Recommendations & Counseliny/o woman with a h/o gastric cancer, now on surveillance. She also has chronic pain, neuropathy.     Neuropathic pain: per chart review possibly multifactorial due to chemo side effect and maybe some spinal process. Currently acceptable control with duloxetine, oxycodone, flexeril  - shared my concern about chronic opioids for this presentation, encouraged ongoing work towards multimodal pain management  - referral to PT for balance training   - encouraged her to follow up with neurology, PM&R  - updated chart to reflect that she is back to taking duloxetine 60mg daily (instead of 90mg daily)      Return to clinic in 3 months    Data / Chart Review:    Review of Systems:   ROS: 10 point ROS neg other than the symptoms noted above " in the HPI and pertinents here.         Physical Exam:   Physical Exam:  Vital Signs not checked, virtual visit    CONSTIT: awake, appears comfortable  EENT: MMM, EOMI, no icterus  RESP: reg, nl effort, no cough  MSK: moves x4, NESTOR  SKIN: no rash, no obvious lesions  NEURO: alert, oriented x3  PSYCH: appropriate affect, memory and thought process intact    The rest of a comprehensive physical examination is deferred  due to public health emergency video visit restrictions.      Current pertinent medications:  Oxycodone 5mg qid prn  Flexeril 10mg tid prn   Duloxetine 60mg daily    Amitriptyline 75mg HS       No pertinent allergies      Lab and imaging data reviewed:  Comments:         Video-Visit Details    Type of service:  Video Visit    Physician has received verbal consent for a Video Visit from the patient? Yes    Video Start Time: 1135    Video End Time (time video stopped): 11:56    Originating Location (pt. Location): [place]    Distant Location (provider location):  [facility]   Distant Location (provider location):  Off-site    Mode of Communication:  Video Conference via [email or web address]    Floresita Gill MD  Palliative Medicine  Pager [phone]        Again, thank you for allowing me to participate in the care of your patient.        Sincerely,        Floresita Gill MD

## 2022-11-02 NOTE — PROGRESS NOTES
"Nathalie is a 57 year old who is being evaluated via a billable video visit.      How would you like to obtain your AVS? MyChart  If the video visit is dropped, the invitation should be resent by: Text to cell phone: [phone]  Will anyone else be joining your video visit? Livia     Ana Paula Haile         Palliative Care Outpatient Clinic Progress Note    Patient Name: Nathalie Bashir is being evaluated via a billable video visit.    Primary Provider:  Alban Yuan  Primary Oncologist: Dr Pollard  Last seen by palliative care: 7/27/22 by Dr Millard      Chief Complaint: Neuropathy    Background/Summary  Medical:  55 yo F with gastric adenocarcinoma              - Found during [facility] 1/2020 for presumed IPMN - no pancreatic malignancy found, but had poorly differentiated adenoca.  Received neoadjuvant chemo (5FU, leucovorin, oxaliplatin, docetaxel) with curative-intent subtotal gastrectomy, David-en-Y gastrojejunostomy 10/2020.  Required stenting of the jejunum for stenosis causing pain, removed 2/8/21.   - on surveillance with scans every 6 months     Chronic and chemo-induced neuropathy - intol of gabapentin, pregabalin, duloxetine somewhat effective, on TID/QID oxycodone. Has been seen by neurology, also h/o \"spinal cord issue\" that predates her cancer diagnosis    Distal Radius fracture Sept 2022 s/p ORIF, working with hand therapy    Social  Lives with her . He had bypass surgery earlier this week. He is expected to come home by the end of the week. Their son and friends will help.   Son, 3 GCs  Retired from special ed    Interim History:  At the last visit they discussed her painful neuropathy (resumed higher dose of cymbalta, ongoing appointments with PM&R, prn oxycodone), nausea (no change, on zofran), muscle spasms (cont flexeril)    Patient is on the call by herself  History gathered today from: patient, medical chart    Her neuropathy in her legs has acted up lately. She is wondering " "whether the change in seasons is related to this.   She has spasms again. Flexeril has been somewhat helpful.   She tried taking duloxetine at 90mg daily, didn't notice a big change and is now back at 60mg daily.   She takes oxycodone with some relief, takes the allowed 4x5mg pretty consistently.     She broke her arm recently, fell when she was carrying things down the stairs and her ankle \"gave out\". In further conversation it sounds that she lost her balance.    I discuss the importance of engaging with PT in order to address issues with balance. We also discuss the increasing fall risk with combining several sedating medications. She is uncertain as to whether she feels ready to make any changes, but does agree to re-establish care with neurology and PM&R once her  has recovered.   She has been on buprenorphine in the past, patch, and didn't appreciate how it made her feel.        Impression & Recommendations & Counseliny/o woman with a h/o gastric cancer, now on surveillance. She also has chronic pain, neuropathy.     Neuropathic pain: per chart review possibly multifactorial due to chemo side effect and maybe some spinal process. Currently acceptable control with duloxetine, oxycodone, flexeril  - shared my concern about chronic opioids for this presentation, encouraged ongoing work towards multimodal pain management  - referral to PT for balance training   - encouraged her to follow up with neurology, PM&R  - updated chart to reflect that she is back to taking duloxetine 60mg daily (instead of 90mg daily)      Return to clinic in 3 months    Data / Chart Review:    Review of Systems:   ROS: 10 point ROS neg other than the symptoms noted above in the HPI and pertinents here.         Physical Exam:   Physical Exam:  Vital Signs not checked, virtual visit    CONSTIT: awake, appears comfortable  EENT: MMM, EOMI, no icterus  RESP: reg, nl effort, no cough  MSK: moves x4, NESTOR  SKIN: no rash, no " obvious lesions  NEURO: alert, oriented x3  PSYCH: appropriate affect, memory and thought process intact    The rest of a comprehensive physical examination is deferred  due to public health emergency video visit restrictions.      Current pertinent medications:  Oxycodone 5mg qid prn  Flexeril 10mg tid prn   Duloxetine 60mg daily    Amitriptyline 75mg HS       No pertinent allergies      Lab and imaging data reviewed:  Comments:         Video-Visit Details    Type of service:  Video Visit    Physician has received verbal consent for a Video Visit from the patient? Yes    Video Start Time: 1135    Video End Time (time video stopped): 11:56    Originating Location (pt. Location): [place]    Distant Location (provider location):  [facility]   Distant Location (provider location):  Off-site    Mode of Communication:  Video Conference via [facility]    Floresita Gill MD  Palliative Medicine  Pager [phone]

## 2022-11-02 NOTE — TELEPHONE ENCOUNTER
RUDY, calling to schedule f/u appt in february, left palliative scheduling number.    Nury Gallegos VF

## 2022-11-02 NOTE — PATIENT INSTRUCTIONS
Patient Instructions      Expand All Collapse All    Thank you for attending the Palliative Care Clinic appointment today.     1. I sent a referral for physical therapy     Call if your symptoms change and the medications we have prescribed are not working.   Do not take your medications at any other dose or frequently than how they are prescribed. Call if you think we should make any changes.    Call us at least 3 workdays in advance if you need a medication refill.    Please have all your pill bottles ready for your next appointment.     Return to clinic in about 3 months for a follow up.     You can reach the Palliative Care Team during business hours at the following number:    - [phone]  - or send me a [employer] message    To reach the Palliative Care Provider on-call After-hours or on holidays and weekends, call: [phone].  Please note that we are not able to provide pain medication refills on evenings or weekends.

## 2022-11-04 DIAGNOSIS — M25.532 LEFT WRIST PAIN: Primary | ICD-10-CM

## 2022-11-14 ENCOUNTER — LAB (OUTPATIENT)
Dept: LAB | Facility: HOSPITAL | Age: 57
End: 2022-11-14
Payer: MEDICARE

## 2022-11-14 ENCOUNTER — THERAPY VISIT (OUTPATIENT)
Dept: OCCUPATIONAL THERAPY | Facility: CLINIC | Age: 57
End: 2022-11-14
Attending: STUDENT IN AN ORGANIZED HEALTH CARE EDUCATION/TRAINING PROGRAM
Payer: MEDICARE

## 2022-11-14 ENCOUNTER — OFFICE VISIT (OUTPATIENT)
Dept: ORTHOPEDICS | Facility: CLINIC | Age: 57
End: 2022-11-14
Payer: MEDICARE

## 2022-11-14 ENCOUNTER — ANCILLARY PROCEDURE (OUTPATIENT)
Dept: GENERAL RADIOLOGY | Facility: CLINIC | Age: 57
End: 2022-11-14
Attending: STUDENT IN AN ORGANIZED HEALTH CARE EDUCATION/TRAINING PROGRAM
Payer: MEDICARE

## 2022-11-14 ENCOUNTER — NURSE TRIAGE (OUTPATIENT)
Dept: UROLOGY | Facility: CLINIC | Age: 57
End: 2022-11-14

## 2022-11-14 DIAGNOSIS — N39.0 RECURRENT UTI: Primary | ICD-10-CM

## 2022-11-14 DIAGNOSIS — N39.0 RECURRENT UTI: ICD-10-CM

## 2022-11-14 DIAGNOSIS — Z98.890 POST-OPERATIVE STATE: ICD-10-CM

## 2022-11-14 DIAGNOSIS — M25.532 LEFT WRIST PAIN: ICD-10-CM

## 2022-11-14 DIAGNOSIS — S52.572D OTHER CLOSED INTRA-ARTICULAR FRACTURE OF DISTAL END OF LEFT RADIUS WITH ROUTINE HEALING, SUBSEQUENT ENCOUNTER: Primary | ICD-10-CM

## 2022-11-14 DIAGNOSIS — M25.532 LEFT WRIST PAIN: Primary | ICD-10-CM

## 2022-11-14 LAB
ALBUMIN UR-MCNC: 30 MG/DL
APPEARANCE UR: ABNORMAL
BACTERIA #/AREA URNS HPF: ABNORMAL /HPF
BILIRUB UR QL STRIP: NEGATIVE
COLOR UR AUTO: YELLOW
GLUCOSE UR STRIP-MCNC: NEGATIVE MG/DL
HGB UR QL STRIP: ABNORMAL
HYALINE CASTS: 5 /LPF
KETONES UR STRIP-MCNC: NEGATIVE MG/DL
LEUKOCYTE ESTERASE UR QL STRIP: ABNORMAL
MUCOUS THREADS #/AREA URNS LPF: PRESENT /LPF
NITRATE UR QL: POSITIVE
PH UR STRIP: 5.5 [PH] (ref 5–7)
RBC URINE: 2 /HPF
SP GR UR STRIP: 1.02 (ref 1–1.03)
SQUAMOUS EPITHELIAL: 2 /HPF
TRANSITIONAL EPI: <1 /HPF
UROBILINOGEN UR STRIP-MCNC: <2 MG/DL
WBC URINE: >182 /HPF

## 2022-11-14 PROCEDURE — 87088 URINE BACTERIA CULTURE: CPT

## 2022-11-14 PROCEDURE — 97110 THERAPEUTIC EXERCISES: CPT | Mod: GO | Performed by: OCCUPATIONAL THERAPIST

## 2022-11-14 PROCEDURE — 73110 X-RAY EXAM OF WRIST: CPT | Mod: LT | Performed by: RADIOLOGY

## 2022-11-14 PROCEDURE — 81001 URINALYSIS AUTO W/SCOPE: CPT

## 2022-11-14 PROCEDURE — 99024 POSTOP FOLLOW-UP VISIT: CPT | Performed by: PHYSICIAN ASSISTANT

## 2022-11-14 NOTE — TELEPHONE ENCOUNTER
[facility] Call Center    Phone Message    May a detailed message be left on voicemail: yes     Reason for Call: Symptoms or Concerns     If patient has red-flag symptoms, warm transfer to triage line    Current symptom or concern: bladder infection symptoms, foul smelling urine, cloudy, lower back pain with it.    Symptoms have been present for:  5 day(s)    Has patient previously been seen for this? Yes    By Dr. Velez    Date: 9/7/22    Are there any new or worsening symptoms? Yes: worsening      Action Taken: Message routed to:  Other: ua uro    Travel Screening: Not Applicable

## 2022-11-14 NOTE — PROGRESS NOTES
SOAP note information for 11/14/2022.  Please refer to the daily flowsheet for treatment today, total treatment time and time spent performing 1:1 timed codes.       Diagnosis: Closed intra-articular fracture of distal end of left radius   DOI: 9/18/22 per patient   DOS: 9/22/22  Procedure: L distal radius ORIF   Post: 7w4d    Precautions: no lifting over a gallon of milk for one month started 11/14      Objective:  Pain Level (Scale 0-10)   10/3/2022 11/14/2022     At Rest 5/10  Depends on activity  2   With Use 5/10       Pain Description  Date 10/3/2022    Location L wrist (circumferential)     Pain Quality Aching and Sharp    Frequency intermittent or constant      Pain is worst  daytime or nighttime    Exacerbated by  with use     Relieved by cold and rest    Progression Unchanged       Sensation    B hand neuropathy per patient; no changes noted     Scar    Steri-strips intact; no signs of infection or drainage.     Edema  Mild swelling to ulnar spect of L wrist;     ROM  Pain Report:  -none + mild    ++ moderate    +++ severe     Wrist 10/3/2022 10/3/2022 11/14/2022      AROM (PROM) R L L    Extension 65 40,  5/10 58    Flexion 40 28, 5/10  47    RD 20 18, 5/10  WFL    UD 50 35, 5/10  WFL    Supination 60 48, 5/10  75    Pronation WFL  WFL  75      L Digit II-V: able to make a loose fist and fully extend digits     Thumb 10/3/2022 10/3/2022   AROM (PROM) R L   MP 0/50 0/30   IP 0/60 0/30   RABD 55 55   PABD 57 58   Kapandji Opposition Scale (0-10/10) 10  9     Strength   (Measured in pounds)  Testing Deferred

## 2022-11-14 NOTE — TELEPHONE ENCOUNTER
Attempted to call pt. Left detailed message to call clinic back at [phone].   To discuss symptoms.     Jessica Wray RN MSN

## 2022-11-14 NOTE — PROGRESS NOTES
Date of Service: Nov 14, 2022    Reason for visit: Postoperative follow-up    Date of Surgery: 9/22/22    Procedure Performed: Open reduction internal fixation  left distal radius fracture    Interval events: The patient comes to see me in follow-up today from the above surgery. The patient reports that their wrist range of motion has been improving. They've been doing their therapy exercises. Pain has been well managed without need for narcotics. Wearing zipper splint as instructed. Has been able to lift up to a gallon of milk without orthosis and with minimal pain. She notes pain is localized over the DRUJ with palpation. Feels her ROM is coming along.     Physical examination:  Well-developed, well-nourished and in no acute distress.  Alert and oriented to surroundings.  On examination of the wrist, incision is well-healed. There is no erythema, drainage, or dehiscence. Swelling is Mild. No distal sensory deficits noted. Patient can actively flex and extend all digits and thumb. The patient is able to make a full composite fist. DRUJ stable. Mild tenderness over the ulnar styloid and DRUJ. Fingers are warm and well-perfused. AROM of the wrist is as follows: 65  extension, 45  flexion, full supination and pronation.    Radiographs: Three views of the left wrist were obtained and reviewed. These demonstrate no changes in hardware or bony alignment.     Assessment: Nathalie Bashir is a 58 y/o female status post ORIF left distal radius, progressing appropriately.    Plan:  The patient may wean out of the splint at this time. The patient may progress range of motion as tolerated with hand therapy and may begin strengthening exercises. No lifting greater than a gallon of milk for one month, then progress activity as tolerated. Avoid painful activities. Otherwise no activity restrictions. We will see the patient back for a postoperative visit in 6 weeks time. Knows to contact us if any questions, concerns, or other  issues arise.     STEFANIA GASTON PA-C  11/14/2022 10:47 AM   Orthopaedic Surgery

## 2022-11-14 NOTE — TELEPHONE ENCOUNTER
Nurse Triage SBAR    Is this a 2nd Level Triage? NO    Situation: Spoke to pt. Pt reports UTI like symptoms.     Background: History of recurring UTI's.     Assessment: Started last week. Urine is cloudy. Lower back was aching for days, but now has improved. Rated up to 6/10, intermittent. Urine has foul odor. Noted increased urgency and frequency. No fever. No hematuria. Drinking about 8 cups of water daily.     Protocol Recommended Disposition:   See in Office Today    Recommendation: UA/UC.   Increase water intake to 10 glasses daily.      .    Does the patient meet one of the following criteria for ADS visit consideration? No     Jessica Wray RN MSN    Orders Placed This Encounter   Procedures     Routine UA with microscopic - No culture     Standing Status:   Future     Standing Expiration Date:   11/14/2023     Urine Culture Aerobic Bacterial     Standing Status:   Future     Standing Expiration Date:   11/14/2023         Reason for Disposition    Side (flank) or lower back pain present    Protocols used: URINARY SYMPTOMS-A-OH

## 2022-11-14 NOTE — NURSING NOTE
Reason For Visit:   Chief Complaint   Patient presents with     Surgical Followup     8 wk POP left wrist ORIF DOS: 9/22/22       Primary MD: Alban Yuan  Ref. MD: [email or web address]    Age: 57 year old    ?  No      LMP 09/23/2014       Pain Assessment  Patient Currently in Pain: No (Occasional left wrist pain with use; aching pain)    Hand Dominance Evaluation  Hand Dominance: Right          QuickDASH Assessment  QuickDASH Main 11/11/2022   1. Open a tight or new jar. Moderate difficulty   2. Do heavy household chores (e.g., wash walls, floors) Moderate difficulty   3. Carry a shopping bag or briefcase. Mild difficulty   4. Wash your back. Moderate difficulty   5. Use a knife to cut food. Severe difficulty   6. Recreational activities in which you take some force or impact through your arm, shoulder or hand (e.g., golf, hammering, tennis, etc.). Unable   7. During the past week, to what extent has your arm, shoulder or hand problem interfered with your normal social activities with family, friends, neighbours or groups? Not at all   8. During the past week, were you limited in your work or other regular daily activities as a result of your arm, shoulder or hand problem? Slightly limited   9. Arm, shoulder or hand pain. Mild   10.Tingling (pins and needles) in your arm,shoulder or hand. Mild   11. During the past week, how much difficulty have you had sleeping because of the pain in your arm, shoulder or hand? Mild difficulty   Quickdash Ability Score 40.91          Current Outpatient Medications   Medication Sig Dispense Refill     amitriptyline (ELAVIL) 75 MG tablet Take 1 tablet (75 mg) by mouth At Bedtime 30 tablet 3     Cyanocobalamin 1000 MCG CAPS Take 1 tablet by mouth every morning (Patient not taking: Reported on 11/2/2022)       cyclobenzaprine (FLEXERIL) 10 MG tablet Take 1 tablet (10 mg) by mouth 3 times daily as needed for muscle spasms 90 tablet 3     DULoxetine (CYMBALTA) 30 MG capsule Take 2  capsules (60 mg) by mouth every morning       famotidine (PEPCID) 20 MG tablet TAKE 1 TABLET (20 MG) BY MOUTH 2 TIMES DAILY AS NEEDED 60 tablet 3     fluticasone (FLONASE) 50 MCG/ACT nasal spray Spray 1 spray into both nostrils daily at 2 pm       hydrOXYzine (VISTARIL) 25 MG capsule Take 25-50 mg by mouth 4 times daily as needed for itching or anxiety       loperamide (IMODIUM) 2 MG capsule Take 4 mg by mouth 4 times daily as needed for diarrhea (Patient not taking: Reported on 11/2/2022)       loratadine-pseudoePHEDrine (CLARITIN-D 24-HOUR)  MG 24 hr tablet Take 1 tablet by mouth daily as needed for allergies Usually takes in Spring/Fall       ondansetron (ZOFRAN) 8 MG tablet Take 1 tablet (8 mg) by mouth every 8 hours as needed (Nausea/Vomiting) 90 tablet 2     oxyCODONE (ROXICODONE) 5 MG tablet Take 1 tablet (5 mg) by mouth 4 times daily as needed for moderate to severe pain Maximum 4 pills/day. 120 tablet 0     pantoprazole (PROTONIX) 40 MG EC tablet TAKE 1 TABLET BY MOUTH 2 TIMES DAILY 90 tablet 1     sennosides (SENOKOT) 8.6 MG tablet 1-2 tablets 1-2 times daily as needed. 120 tablet 1     simethicone 80 MG TABS Take 1 tablet by mouth every 6 hours as needed (bloating, gas, belching) 90 tablet 3     SUMAtriptan (IMITREX) 100 MG tablet Take 100 mg by mouth at onset of headache for migraine         Allergies   Allergen Reactions     Gluten Meal Palpitations     Augmentin Rash     Patient tolerated Zosyn in 7/2020     Oxycontin [Oxycodone] Other (See Comments)     Confusion, loopy, oxycodone is tolerated.    Pt states she tolerates regular oxycodone, cannot take 12 hour oxycontin.       Pregabalin      interfered with Cymbalta     Topiramate      Tongue numbness, taste alteration, irritable      Acetaminophen Nausea     Urine retention       Dust Mite Extract      Gabapentin Dizziness and GI Disturbance     Ketorolac Headache     Latex Rash     Methadone Fatigue     Mold      Naprosyn [Naproxen] Dizziness  and GI Disturbance     Seasonal Allergies        Mira Shannon, AEMT

## 2022-11-14 NOTE — LETTER
11/14/2022         RE: Nathalie Bashir  [address] [address]        Dear Colleague,    Thank you for referring your patient, Nathalie Bashir, to the [facility]. Please see a copy of my visit note below.    Date of Service: Nov 14, 2022    Reason for visit: Postoperative follow-up    Date of Surgery: 9/22/22    Procedure Performed: Open reduction internal fixation  left distal radius fracture    Interval events: The patient comes to see me in follow-up today from the above surgery. The patient reports that their wrist range of motion has been improving. They've been doing their therapy exercises. Pain has been well managed without need for narcotics. Wearing zipper splint as instructed. Has been able to lift up to a gallon of milk without orthosis and with minimal pain. She notes pain is localized over the DRUJ with palpation. Feels her ROM is coming along.     Physical examination:  Well-developed, well-nourished and in no acute distress.  Alert and oriented to surroundings.  On examination of the wrist, incision is well-healed. There is no erythema, drainage, or dehiscence. Swelling is Mild. No distal sensory deficits noted. Patient can actively flex and extend all digits and thumb. The patient is able to make a full composite fist. DRUJ stable. Mild tenderness over the ulnar styloid and DRUJ. Fingers are warm and well-perfused. AROM of the wrist is as follows: 65  extension, 45  flexion, full supination and pronation.    Radiographs: Three views of the left wrist were obtained and reviewed. These demonstrate no changes in hardware or bony alignment.     Assessment: Nathalie Bashir is a 56 y/o female status post ORIF left distal radius, progressing appropriately.    Plan:  The patient may wean out of the splint at this time. The patient may progress range of motion as tolerated with hand therapy and may begin strengthening exercises. No lifting greater  than a gallon of milk for one month, then progress activity as tolerated. Avoid painful activities. Otherwise no activity restrictions. We will see the patient back for a postoperative visit in 6 weeks time. Knows to contact us if any questions, concerns, or other issues arise.     STEFANIA GASTON PA-C  11/14/2022 10:47 AM   Orthopaedic Surgery

## 2022-11-15 DIAGNOSIS — N39.0 URINARY TRACT INFECTION: Primary | ICD-10-CM

## 2022-11-15 RX ORDER — SULFAMETHOXAZOLE/TRIMETHOPRIM 800-160 MG
1 TABLET ORAL 2 TIMES DAILY
Qty: 10 TABLET | Refills: 0 | Status: SHIPPED | OUTPATIENT
Start: 2022-11-15 | End: 2022-11-20

## 2022-11-16 LAB — BACTERIA UR CULT: ABNORMAL

## 2022-11-28 ENCOUNTER — LAB (OUTPATIENT)
Dept: LAB | Facility: CLINIC | Age: 57
End: 2022-11-28
Payer: MEDICARE

## 2022-11-28 ENCOUNTER — MYC REFILL (OUTPATIENT)
Dept: PALLIATIVE CARE | Facility: CLINIC | Age: 57
End: 2022-11-28

## 2022-11-28 ENCOUNTER — ANCILLARY PROCEDURE (OUTPATIENT)
Dept: CT IMAGING | Facility: CLINIC | Age: 57
End: 2022-11-28
Attending: INTERNAL MEDICINE
Payer: MEDICARE

## 2022-11-28 DIAGNOSIS — C16.8 MALIGNANT NEOPLASM OF OVERLAPPING SITES OF STOMACH (H): ICD-10-CM

## 2022-11-28 DIAGNOSIS — D64.9 ANEMIA, UNSPECIFIED TYPE: ICD-10-CM

## 2022-11-28 DIAGNOSIS — M79.2 NEUROPATHIC PAIN: ICD-10-CM

## 2022-11-28 DIAGNOSIS — C16.3 MALIGNANT NEOPLASM OF PYLORIC ANTRUM (H): ICD-10-CM

## 2022-11-28 DIAGNOSIS — G89.3 CHRONIC PAIN DUE TO NEOPLASM: ICD-10-CM

## 2022-11-28 LAB
ALBUMIN SERPL BCG-MCNC: 3.9 G/DL (ref 3.5–5.2)
ALP SERPL-CCNC: 115 U/L (ref 35–104)
ALT SERPL W P-5'-P-CCNC: 16 U/L (ref 10–35)
ANION GAP SERPL CALCULATED.3IONS-SCNC: 8 MMOL/L (ref 7–15)
AST SERPL W P-5'-P-CCNC: 23 U/L (ref 10–35)
BASOPHILS # BLD AUTO: 0 10E3/UL (ref 0–0.2)
BASOPHILS NFR BLD AUTO: 1 %
BILIRUB SERPL-MCNC: 0.3 MG/DL
BUN SERPL-MCNC: 11.9 MG/DL (ref 6–20)
CALCIUM SERPL-MCNC: 9 MG/DL (ref 8.6–10)
CHLORIDE SERPL-SCNC: 101 MMOL/L (ref 98–107)
CREAT SERPL-MCNC: 1.04 MG/DL (ref 0.51–0.95)
DEPRECATED HCO3 PLAS-SCNC: 29 MMOL/L (ref 22–29)
EOSINOPHIL # BLD AUTO: 0.1 10E3/UL (ref 0–0.7)
EOSINOPHIL NFR BLD AUTO: 2 %
ERYTHROCYTE [DISTWIDTH] IN BLOOD BY AUTOMATED COUNT: 12.9 % (ref 10–15)
FERRITIN SERPL-MCNC: 121 NG/ML (ref 11–328)
GFR SERPL CREATININE-BSD FRML MDRD: 63 ML/MIN/1.73M2
GLUCOSE SERPL-MCNC: 83 MG/DL (ref 70–99)
HCT VFR BLD AUTO: 38.7 % (ref 35–47)
HGB BLD-MCNC: 12.3 G/DL (ref 11.7–15.7)
IMM GRANULOCYTES # BLD: 0 10E3/UL
IMM GRANULOCYTES NFR BLD: 0 %
IRON BINDING CAPACITY (ROCHE): 291 UG/DL (ref 240–430)
IRON SATN MFR SERPL: 36 % (ref 15–46)
IRON SERPL-MCNC: 104 UG/DL (ref 37–145)
LYMPHOCYTES # BLD AUTO: 2.4 10E3/UL (ref 0.8–5.3)
LYMPHOCYTES NFR BLD AUTO: 37 %
MCH RBC QN AUTO: 29.3 PG (ref 26.5–33)
MCHC RBC AUTO-ENTMCNC: 31.8 G/DL (ref 31.5–36.5)
MCV RBC AUTO: 92 FL (ref 78–100)
MONOCYTES # BLD AUTO: 0.4 10E3/UL (ref 0–1.3)
MONOCYTES NFR BLD AUTO: 7 %
NEUTROPHILS # BLD AUTO: 3.4 10E3/UL (ref 1.6–8.3)
NEUTROPHILS NFR BLD AUTO: 53 %
NRBC # BLD AUTO: 0 10E3/UL
NRBC BLD AUTO-RTO: 0 /100
PLATELET # BLD AUTO: 223 10E3/UL (ref 150–450)
POTASSIUM SERPL-SCNC: 4.8 MMOL/L (ref 3.4–5.3)
PROT SERPL-MCNC: 6.5 G/DL (ref 6.4–8.3)
RBC # BLD AUTO: 4.2 10E6/UL (ref 3.8–5.2)
SODIUM SERPL-SCNC: 138 MMOL/L (ref 136–145)
WBC # BLD AUTO: 6.4 10E3/UL (ref 4–11)

## 2022-11-28 PROCEDURE — 83550 IRON BINDING TEST: CPT

## 2022-11-28 PROCEDURE — 36591 DRAW BLOOD OFF VENOUS DEVICE: CPT

## 2022-11-28 PROCEDURE — 71260 CT THORAX DX C+: CPT | Performed by: RADIOLOGY

## 2022-11-28 PROCEDURE — 82728 ASSAY OF FERRITIN: CPT

## 2022-11-28 PROCEDURE — 82607 VITAMIN B-12: CPT

## 2022-11-28 PROCEDURE — 85004 AUTOMATED DIFF WBC COUNT: CPT

## 2022-11-28 PROCEDURE — 250N000011 HC RX IP 250 OP 636: Performed by: INTERNAL MEDICINE

## 2022-11-28 PROCEDURE — 80053 COMPREHEN METABOLIC PANEL: CPT

## 2022-11-28 PROCEDURE — 74177 CT ABD & PELVIS W/CONTRAST: CPT | Performed by: RADIOLOGY

## 2022-11-28 RX ORDER — HEPARIN SODIUM (PORCINE) LOCK FLUSH IV SOLN 100 UNIT/ML 100 UNIT/ML
500 SOLUTION INTRAVENOUS ONCE
Status: COMPLETED | OUTPATIENT
Start: 2022-11-28 | End: 2022-11-28

## 2022-11-28 RX ORDER — IOPAMIDOL 755 MG/ML
100 INJECTION, SOLUTION INTRAVASCULAR ONCE
Status: COMPLETED | OUTPATIENT
Start: 2022-11-28 | End: 2022-11-28

## 2022-11-28 RX ORDER — OXYCODONE HYDROCHLORIDE 5 MG/1
5 TABLET ORAL 4 TIMES DAILY PRN
Qty: 120 TABLET | Refills: 0 | Status: SHIPPED | OUTPATIENT
Start: 2022-11-28 | End: 2022-12-20

## 2022-11-28 RX ADMIN — Medication 500 UNITS: at 10:22

## 2022-11-28 RX ADMIN — IOPAMIDOL 100 ML: 755 INJECTION, SOLUTION INTRAVASCULAR at 10:45

## 2022-11-28 NOTE — NURSING NOTE
"Chief Complaint   Patient presents with     Port Draw     Labs drawn via port by RN. Vitals taken.     Port accessed with 20G 3/4\" flat needle by RN, labs collected, line flushed with saline and heparin.  Vitals taken. Pt checked in for appointment(s).    Elise Hernandez RN    "

## 2022-11-28 NOTE — TELEPHONE ENCOUNTER
Received Guarnic message from patient requesting refill of oxycodone.     Last refill: 11/3/22  Last office visit: 11/2/22  Scheduled for follow up planned for early February. Message sent to schedulers.    Will route request to MD for review.     Reviewed [place]  Report.

## 2022-11-29 LAB — VIT B12 SERPL-MCNC: 538 PG/ML (ref 232–1245)

## 2022-12-01 ENCOUNTER — VIRTUAL VISIT (OUTPATIENT)
Dept: ONCOLOGY | Facility: CLINIC | Age: 57
End: 2022-12-01
Attending: INTERNAL MEDICINE
Payer: MEDICARE

## 2022-12-01 DIAGNOSIS — C16.3 MALIGNANT NEOPLASM OF PYLORIC ANTRUM (H): Primary | ICD-10-CM

## 2022-12-01 PROCEDURE — 99214 OFFICE O/P EST MOD 30 MIN: CPT | Mod: 95 | Performed by: INTERNAL MEDICINE

## 2022-12-01 NOTE — LETTER
12/1/2022         RE: Nathalie Bashir  [address] [address]        Dear Colleague,    Thank you for referring your patient, Nathalie Bashir, to the [facility]. Please see a copy of my visit note below.    Nathalie is a 57 year old who is being evaluated via a billable video visit.    Patient stated she is in the state of [place] for the visit today.    How would you like to obtain your AVS? MyChart  If the video visit is dropped, the invitation should be resent by: Text to cell phone: [phone]  Will anyone else be joining your video visit? Yes,     Mirta Sommer, Virtual Visit Facilitator      Video-Visit Details    Video Start Time: 2:41 PM    Type of service:  Video Visit    Video End Time:2:47 PM    Originating Location (pt. Location): Home    Distant Location (provider location):  Off-site    Platform used for Video Visit: [facility]    Oncology/Hematology Visit Note  Dec 1, 2022    Reason for Visit: Follow up of gastric adenocarcinoma.    History of Present Illness:     Please see previous notes for detail. I have copied and updated from prior notes.    Ms. Bashir is a 57 year old female with a history of gastric adenocarcinoma.  She was being followed for a pancreatic duct dilatation and pancreatic cyst which was noted in November 2018.  Since May 2019 she started noticing some nausea and vomiting and occasional abdominal discomfort.  She had an ultrasound in August 2019 which was essentially unremarkable.  She had a repeat endoscopic ultrasound on 10/29/2019 which showed increase in size of the cyst as well as dilation of the main pancreatic duct.  FNA and fluid analysis showed mucinous epithelium.  She was referred to Dr. 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  Surprisingly there was no pancreatic ductal neoplasm seen but on the resected specimen stomach cancer was noted.  It was poorly differentiated adenocarcinoma  with poorly cohesive/signet ring cell type with proximal gastric mucosal and serosal margins being positive.  There was lymphovascular and perineural invasion seen.  22 lymph nodes were sampled and all were benign.  It was a pT4aN0 lesion.  HER-2/christine FISH was not amplified.         EUS on 3/3/2020 without clear evidence of neoplasm endoscopically. Left gastric lymph node fine-needle aspiration was negative for carcinoma. Biopsy from the gastric jejunal anastomosis as well as lesser curvature of the stomach also did not show any malignancy.     She had a PET/CT on 3/13/2020 which showed soft tissue streaky density with increased FDG uptake adjacent to the pancreaticojejunostomy which could be neoplastic in origin.  There is mild increased FDG uptake in the gastric remnant.  Focal area of soft tissue thickening with fatty streakiness along medial laparotomy with increased FDG uptake which likely is inflammatory. Increased FDG uptake in thoracic esophagus which could be esophagitis.      She started on chemotherapy with 5FU, oxaliplatin, and Taxotere on 3/27/20. She was seen on 4/7/20 for evaluation of fevers. She was treated for possible pneumonia with Levaquin.      She was seen in clinic 4/13/20 for RUQ pain,CT and labs reassuring. She received cycles 2 and 3 of chemotherapy on 4/20/20 and 5/4/20. She was admitted from 5/21-5/23/20 with neutropenic aspiration pneumonia. She received cycle 4 on 5/27/20 with Neulasta support. PET/CT on 6/3/20 showed mild residual uptake at the site of gastrojejunal anastomosis which likely is physiologic.  There is almost resolution of soft tissue nodule next to the pancreaticoduodenal anastomosis without FDG uptake.  There is focal increased uptake in the left posterior acetabulum which could be artifact.     Decreased dose of oxaliplatin due to neuropathy/cold sensitivity on 6/11. Decreased 5FU to 85% with cycle #6 due to mucositis/diarrhea/taste changes. Treatment was held 7/9/20  due to diarrhea, dehydration, tachycardia, rash, and weight loss and she got cycle 7 on 7/15. On 7/20 she presented to the ED with RUQ abdominal pain, and was admitted 7/20-7/29 for acute cholangitis     She received C#8 FLOT on 8/14/2020.    PET/CT on 9/2/2020 did not show evidence of disease.  Small peritoneal nodule was a stable.  This was most consistent with fat necrosis.    CT chest abdomen and pelvis on 9/23/2020 also does not show evidence of malignancy recurrence and the peritoneal nodule in the right upper quadrant is stable.  Before definite surgery she had diagnostic laparoscopy which was negative for evidence of malignancy so on 10/21/2020 she had exploratory laparotomy with extensive lysis of additions, partial omentectomy, resection of Gastrojejunal Anastomosis w/ En Bloc Subtotal Gastrectomy and modified D2 Lymphadenectomy with David-en-Y Gastrojejunostomy Reconstruction.  Final pathology showed Focal residual poorly differentiated adenocarcinoma (0.4cm) with signet-ring morphology status    post-neoadjuvant chemotherapy and all 9 lymph nodes were benign.  All margins were negative.  It was a pT1bN0 lesion.    Before definite surgery she had diagnostic laparoscopy which was negative for evidence of malignancy so on 10/21/2020 she had exploratory laparotomy with extensive lysis of additions, partial omentectomy, resection of Gastrojejunal Anastomosis w/ En Bloc Subtotal Gastrectomy and modified D2 Lymphadenectomy with David-en-Y Gastrojejunostomy Reconstruction.  Final pathology showed Focal residual poorly differentiated adenocarcinoma (0.4cm) with signet-ring morphology status    post-neoadjuvant chemotherapy and all 9 lymph nodes were benign.  All margins were negative.  It was a fyS2qV2 lesion.    She was admitted from 11/4/2020-11/6/2020 for nausea vomiting and left upper quadrant pain and CT scan showed fluid collection in the left upper quadrant, likely hematoma.  Upper GI was negative for leak.   No drainage was recommended by IR.  He was discharged on 7 days of levofloxacin and Flagyl.      11/20/2020.  CT abdomen and pelvis showed postsurgical changes of Whipple and subtotal gastrectomy with David-en-Y reconstruction.  There is resolution of previously demonstrated fluid collection between the stomach and the spleen and decrease in sigmoid thickening.  Small left pleural effusion with atelectasis adjacent to it.    12/7/2020.  EGD showed healthy-appearing and generous pouch (remaining the stomach) with widely patent end to side gastrojejunostomy, though with the David/enteral limb mildly stenostic and tortuous at its origin. Therefore a covered metal stent was placed from stomach to jejunum across the stenotic region, resulting in straightening of the course.    2/8/2021.  EGD was done and stent was removed.    5/21/2021.  CT chest abdomen and pelvis does not show evidence of recurrence of malignancy.  11/15/2021-CT chest abdomen and pelvis without evidence of recurrence.  5/17/2022.  CT chest abdomen and pelvis without evidence of recurrence.    She was admitted to the hospital from 8/22/2020 - 8/26/2022 for right-sided pyelonephritis and E. coli bacteremia.  I reviewed the discharge summary.    She was started on meropenem and then ceftriaxone and discharged on levofloxacin.        Interval History:  She is doing well.  In September, she had a trauma and fractured her left wrist which required ORIF.  This is doing well now.  Sometimes she notices pain around her lower rib cage area.  Sometimes she gets migraines with nausea.  Otherwise denies any nausea.  Eating well.  Bowel movements are fine.  Denies any shortness of breath.    ECOG 1    ROS:  Otherwise a comprehensive review of the system was unremarkable.    I reviewed other history in epic as below.      Current Outpatient Medications   Medication Sig Dispense Refill     amitriptyline (ELAVIL) 75 MG tablet Take 1 tablet (75 mg) by mouth At Bedtime 30  tablet 3     Cyanocobalamin 1000 MCG CAPS Take 1 tablet by mouth every morning (Patient not taking: Reported on 11/2/2022)       cyclobenzaprine (FLEXERIL) 10 MG tablet Take 1 tablet (10 mg) by mouth 3 times daily as needed for muscle spasms 90 tablet 3     DULoxetine (CYMBALTA) 30 MG capsule Take 2 capsules (60 mg) by mouth every morning       famotidine (PEPCID) 20 MG tablet TAKE 1 TABLET (20 MG) BY MOUTH 2 TIMES DAILY AS NEEDED 60 tablet 3     fluticasone (FLONASE) 50 MCG/ACT nasal spray Spray 1 spray into both nostrils daily at 2 pm       hydrOXYzine (VISTARIL) 25 MG capsule Take 25-50 mg by mouth 4 times daily as needed for itching or anxiety       loperamide (IMODIUM) 2 MG capsule Take 4 mg by mouth 4 times daily as needed for diarrhea (Patient not taking: Reported on 11/2/2022)       loratadine-pseudoePHEDrine (CLARITIN-D 24-HOUR)  MG 24 hr tablet Take 1 tablet by mouth daily as needed for allergies Usually takes in Spring/Fall       ondansetron (ZOFRAN) 8 MG tablet Take 1 tablet (8 mg) by mouth every 8 hours as needed (Nausea/Vomiting) 90 tablet 2     oxyCODONE (ROXICODONE) 5 MG tablet Take 1 tablet (5 mg) by mouth 4 times daily as needed for moderate to severe pain Maximum 4 pills/day. 120 tablet 0     pantoprazole (PROTONIX) 40 MG EC tablet TAKE 1 TABLET BY MOUTH 2 TIMES DAILY 90 tablet 1     sennosides (SENOKOT) 8.6 MG tablet 1-2 tablets 1-2 times daily as needed. 120 tablet 1     simethicone 80 MG TABS Take 1 tablet by mouth every 6 hours as needed (bloating, gas, belching) 90 tablet 3     SUMAtriptan (IMITREX) 100 MG tablet Take 100 mg by mouth at onset of headache for migraine         Physical Examination:    LMP 09/23/2014   Wt Readings from Last 10 Encounters:   09/22/22 80.7 kg (178 lb)   09/21/22 81 kg (178 lb 8 oz)   09/18/22 80.7 kg (178 lb)   09/15/22 80.8 kg (178 lb 1.6 oz)   08/22/22 82.1 kg (181 lb)   06/27/22 77.8 kg (171 lb 8.3 oz)   05/19/22 77.8 kg (171 lb 9.6 oz)   04/28/22 77.1 kg  (170 lb)   03/15/22 80.6 kg (177 lb 9.6 oz)   11/27/21 70.8 kg (156 lb)           Constitutional.  Does not seem to be in any acute distress.  Eyes.  No redness or discharge noted.  Respiratory.  Speaking in full sentences.  Breathing seems comfortable without any accessory use of muscles.    Skin.  Visualized his skin does not show any obvious rashes.  Musculoskeletal.  Range of motion for visualized areas is intact.  Neurological.  Alert and oriented x3.  Psychiatric.  Mood, mentation and affect are normal.  Decision making capacity is intact.      The rest of a comprehensive physical examination is deferred due to [facility] video visit restrictions.      Laboratory Data:     Reviewed  11/28/2022  CBC unremarkable with hemoglobin 12.3.        Chemistry unremarkable except creatinine 1.04 and alkaline phosphatase 115.  Ferritin 121.  Iron 104.  TIBC 291 iron saturation index 36%.  Vitamin B12 was 538.    8/23/2022.  CT chest PE protocol, CT abdomen and pelvis  No pulmonary embolism.  Mild infectious or inflammatory nodular infiltrate in the dependent left lower lobe.  Stable pulmonary nodules.  Postcholecystectomy. Biliary enteric anastomosis. Mild pneumobilia. Post Whipple procedure  Wall thickening of the urinary bladder. Prominent urothelial enhancement of the right ureter and renal pelvis. Mild asymmetric inflammatory stranding of the right kidney. Subtle striated cortical hypoenhancement of the right kidney.   Normal left kidney.  Normal lymph nodes.      2/8/2021.  EGD showed David en Y anatomy with generous pouch and stent bridging the GJ and proximal David   The stent was removed and a relook endoscopies showed widely patent prioximal David and associated superficial ulceration          Assessment and Plan:    Gastric adenocarcinona, HER-2 negative.   - Started on neoadjuvant FLOT on 3/7/2020. She has required a dose reduction of her oxaliplatin by 30% due to cold sensitivity/neuropathy with  cycle #5. 5FU was decreased to 85% with cycle #6 due to mucositis/diarrhea/taste changes.   Received C#8 on 8/14/2020.  PET/CT on 9/2/2020 did not show evidence of disease.  Small peritoneal nodule was stable.  This was most consistent with fat necrosis.    CT chest abdomen and pelvis on 9/23/2020 also does not show evidence of malignancy recurrence and the peritoneal nodule in the right upper quadrant is stable.    Before definite surgery she had diagnostic laparoscopy which was negative for evidence of malignancy so on 10/21/2020 she had exploratory laparotomy with extensive lysis of additions, partial omentectomy, resection of Gastrojejunal Anastomosis w/ En Bloc Subtotal Gastrectomy and modified D2 Lymphadenectomy with David-en-Y Gastrojejunostomy Reconstruction.  Final pathology showed Focal residual poorly differentiated adenocarcinoma (0.4cm) with signet-ring morphology status    post-neoadjuvant chemotherapy and all 9 lymph nodes were benign.  All margins were negative.  It was a [id number] lesion.    We decided to keep her on observation as she had received 6 months of neoadjuvant chemotherapy and then the definite surgery.     She is doing well and currently there is no evidence of recurrence of the cancer.  We will repeat CT chest abdomen and pelvis in 6 months and repeat blood work at that time.  EGD would be done on an as-needed basis.        Left lower lobe lung infiltrate.  Previously she had left lower lobe lung infiltrate which was likely infectious/inflammatory.  On repeat CT chest lungs are clear.  She does not have any respiratory complaints.  Continue to observe       Anemia.   She had iron deficiency anemia and she received INFeD on 10/16/2020.  Anemia resolved but she became anemic again.  Iron studies were unremarkable in May 2022.  B12 and folate were normal.  Erythropoietin was inappropriately low.    Her hemoglobin was low again when she was admitted for pyelonephritis but now anemia has  resolved and iron studies and B12 are normal.  Continue to observe.      Port-A-Cath.  Port flushes every 8 weeks.    We did not address the following today.  Pyelonephritis.  E. coli bacteremia.  Repeat blood cultures were negative.  Now off antibiotics.  Initially there was a consideration to remove the Port-A-Cath but she improved and blood cultures became negative so port is still there.        Neuropathy.  She has chemotherapy related neuropathy.  In the past she tried acupuncture but it upset her nerve so she stopped it. She was intolerant to gabapentin and pregabalin. She is taking duloxetine and QID oxycodone. She is following with Palliative care.  We had previously discussed about trying laser therapy.        Abdominal pain.  This improved after EGD in December 2020 with a covered metal stent was placed from stomach to jejunum across the stenotic region of David/enteral limb.  The stent was removed in February 2021.       Return to clinic in 6 months with labs/CT scan prior.    All questions answered and she is agreeable with the plan.    Magaly Pollard MD

## 2022-12-01 NOTE — NURSING NOTE
Patient verified medications and allergies are correct via eCheck-in. Patient confirms no changes at this time and/or since last reviewed by clinic staff.    Mirta Sommer, Virtual Facilitator

## 2022-12-01 NOTE — PROGRESS NOTES
Nathalie is a 57 year old who is being evaluated via a billable video visit.    Patient stated she is in the state of [place] for the visit today.    How would you like to obtain your AVS? MyChart  If the video visit is dropped, the invitation should be resent by: Text to cell phone: [phone]  Will anyone else be joining your video visit? Yes,     Mirta Sommer, Virtual Visit Facilitator      Video-Visit Details    Video Start Time: 2:41 PM    Type of service:  Video Visit    Video End Time:2:47 PM    Originating Location (pt. Location): Home    Distant Location (provider location):  Off-site    Platform used for Video Visit: [employer]

## 2022-12-01 NOTE — PROGRESS NOTES
Oncology/Hematology Visit Note  Dec 1, 2022    Reason for Visit: Follow up of gastric adenocarcinoma.    History of Present Illness:     Please see previous notes for detail. I have copied and updated from prior notes.    Ms. Bashir is a 57 year old female with a history of gastric adenocarcinoma.  She was being followed for a pancreatic duct dilatation and pancreatic cyst which was noted in November 2018.  Since May 2019 she started noticing some nausea and vomiting and occasional abdominal discomfort.  She had an ultrasound in August 2019 which was essentially unremarkable.  She had a repeat endoscopic ultrasound on 10/29/2019 which showed increase in size of the cyst as well as dilation of the main pancreatic duct.  FNA and fluid analysis showed mucinous epithelium.  She was referred to Dr. 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  Surprisingly there was no pancreatic ductal neoplasm seen but on the resected specimen stomach cancer was noted.  It was poorly differentiated adenocarcinoma with poorly cohesive/signet ring cell type with proximal gastric mucosal and serosal margins being positive.  There was lymphovascular and perineural invasion seen.  22 lymph nodes were sampled and all were benign.  It was a pT4aN0 lesion.  HER-2/christine FISH was not amplified.         EUS on 3/3/2020 without clear evidence of neoplasm endoscopically. Left gastric lymph node fine-needle aspiration was negative for carcinoma. Biopsy from the gastric jejunal anastomosis as well as lesser curvature of the stomach also did not show any malignancy.     She had a PET/CT on 3/13/2020 which showed soft tissue streaky density with increased FDG uptake adjacent to the pancreaticojejunostomy which could be neoplastic in origin.  There is mild increased FDG uptake in the gastric remnant.  Focal area of soft tissue thickening with fatty streakiness along medial laparotomy with increased FDG uptake which  likely is inflammatory. Increased FDG uptake in thoracic esophagus which could be esophagitis.      She started on chemotherapy with 5FU, oxaliplatin, and Taxotere on 3/27/20. She was seen on 4/7/20 for evaluation of fevers. She was treated for possible pneumonia with Levaquin.      She was seen in clinic 4/13/20 for RUQ pain,CT and labs reassuring. She received cycles 2 and 3 of chemotherapy on 4/20/20 and 5/4/20. She was admitted from 5/21-5/23/20 with neutropenic aspiration pneumonia. She received cycle 4 on 5/27/20 with Neulasta support. PET/CT on 6/3/20 showed mild residual uptake at the site of gastrojejunal anastomosis which likely is physiologic.  There is almost resolution of soft tissue nodule next to the pancreaticoduodenal anastomosis without FDG uptake.  There is focal increased uptake in the left posterior acetabulum which could be artifact.     Decreased dose of oxaliplatin due to neuropathy/cold sensitivity on 6/11. Decreased 5FU to 85% with cycle #6 due to mucositis/diarrhea/taste changes. Treatment was held 7/9/20 due to diarrhea, dehydration, tachycardia, rash, and weight loss and she got cycle 7 on 7/15. On 7/20 she presented to the ED with RUQ abdominal pain, and was admitted 7/20-7/29 for acute cholangitis     She received C#8 FLOT on 8/14/2020.    PET/CT on 9/2/2020 did not show evidence of disease.  Small peritoneal nodule was a stable.  This was most consistent with fat necrosis.    CT chest abdomen and pelvis on 9/23/2020 also does not show evidence of malignancy recurrence and the peritoneal nodule in the right upper quadrant is stable.  Before definite surgery she had diagnostic laparoscopy which was negative for evidence of malignancy so on 10/21/2020 she had exploratory laparotomy with extensive lysis of additions, partial omentectomy, resection of Gastrojejunal Anastomosis w/ En Bloc Subtotal Gastrectomy and modified D2 Lymphadenectomy with David-en-Y Gastrojejunostomy Reconstruction.   Final pathology showed Focal residual poorly differentiated adenocarcinoma (0.4cm) with signet-ring morphology status    post-neoadjuvant chemotherapy and all 9 lymph nodes were benign.  All margins were negative.  It was a pT1bN0 lesion.    Before definite surgery she had diagnostic laparoscopy which was negative for evidence of malignancy so on 10/21/2020 she had exploratory laparotomy with extensive lysis of additions, partial omentectomy, resection of Gastrojejunal Anastomosis w/ En Bloc Subtotal Gastrectomy and modified D2 Lymphadenectomy with David-en-Y Gastrojejunostomy Reconstruction.  Final pathology showed Focal residual poorly differentiated adenocarcinoma (0.4cm) with signet-ring morphology status    post-neoadjuvant chemotherapy and all 9 lymph nodes were benign.  All margins were negative.  It was a onU6aY6 lesion.    She was admitted from 11/4/2020-11/6/2020 for nausea vomiting and left upper quadrant pain and CT scan showed fluid collection in the left upper quadrant, likely hematoma.  Upper GI was negative for leak.  No drainage was recommended by IR.  He was discharged on 7 days of levofloxacin and Flagyl.      11/20/2020.  CT abdomen and pelvis showed postsurgical changes of Whipple and subtotal gastrectomy with David-en-Y reconstruction.  There is resolution of previously demonstrated fluid collection between the stomach and the spleen and decrease in sigmoid thickening.  Small left pleural effusion with atelectasis adjacent to it.    12/7/2020.  EGD showed healthy-appearing and generous pouch (remaining the stomach) with widely patent end to side gastrojejunostomy, though with the David/enteral limb mildly stenostic and tortuous at its origin. Therefore a covered metal stent was placed from stomach to jejunum across the stenotic region, resulting in straightening of the course.    2/8/2021.  EGD was done and stent was removed.    5/21/2021.  CT chest abdomen and pelvis does not show evidence of  recurrence of malignancy.  11/15/2021-CT chest abdomen and pelvis without evidence of recurrence.  5/17/2022.  CT chest abdomen and pelvis without evidence of recurrence.    She was admitted to the hospital from 8/22/2020 - 8/26/2022 for right-sided pyelonephritis and E. coli bacteremia.  I reviewed the discharge summary.    She was started on meropenem and then ceftriaxone and discharged on levofloxacin.        Interval History:  She is doing well.  In September, she had a trauma and fractured her left wrist which required ORIF.  This is doing well now.  Sometimes she notices pain around her lower rib cage area.  Sometimes she gets migraines with nausea.  Otherwise denies any nausea.  Eating well.  Bowel movements are fine.  Denies any shortness of breath.    ECOG 1    ROS:  Otherwise a comprehensive review of the system was unremarkable.    I reviewed other history in epic as below.      Current Outpatient Medications   Medication Sig Dispense Refill     amitriptyline (ELAVIL) 75 MG tablet Take 1 tablet (75 mg) by mouth At Bedtime 30 tablet 3     Cyanocobalamin 1000 MCG CAPS Take 1 tablet by mouth every morning (Patient not taking: Reported on 11/2/2022)       cyclobenzaprine (FLEXERIL) 10 MG tablet Take 1 tablet (10 mg) by mouth 3 times daily as needed for muscle spasms 90 tablet 3     DULoxetine (CYMBALTA) 30 MG capsule Take 2 capsules (60 mg) by mouth every morning       famotidine (PEPCID) 20 MG tablet TAKE 1 TABLET (20 MG) BY MOUTH 2 TIMES DAILY AS NEEDED 60 tablet 3     fluticasone (FLONASE) 50 MCG/ACT nasal spray Spray 1 spray into both nostrils daily at 2 pm       hydrOXYzine (VISTARIL) 25 MG capsule Take 25-50 mg by mouth 4 times daily as needed for itching or anxiety       loperamide (IMODIUM) 2 MG capsule Take 4 mg by mouth 4 times daily as needed for diarrhea (Patient not taking: Reported on 11/2/2022)       loratadine-pseudoePHEDrine (CLARITIN-D 24-HOUR)  MG 24 hr tablet Take 1 tablet by mouth  daily as needed for allergies Usually takes in Spring/Fall       ondansetron (ZOFRAN) 8 MG tablet Take 1 tablet (8 mg) by mouth every 8 hours as needed (Nausea/Vomiting) 90 tablet 2     oxyCODONE (ROXICODONE) 5 MG tablet Take 1 tablet (5 mg) by mouth 4 times daily as needed for moderate to severe pain Maximum 4 pills/day. 120 tablet 0     pantoprazole (PROTONIX) 40 MG EC tablet TAKE 1 TABLET BY MOUTH 2 TIMES DAILY 90 tablet 1     sennosides (SENOKOT) 8.6 MG tablet 1-2 tablets 1-2 times daily as needed. 120 tablet 1     simethicone 80 MG TABS Take 1 tablet by mouth every 6 hours as needed (bloating, gas, belching) 90 tablet 3     SUMAtriptan (IMITREX) 100 MG tablet Take 100 mg by mouth at onset of headache for migraine         Physical Examination:    [facility] 09/23/2014   Wt Readings from Last 10 Encounters:   09/22/22 80.7 kg (178 lb)   09/21/22 81 kg (178 lb 8 oz)   09/18/22 80.7 kg (178 lb)   09/15/22 80.8 kg (178 lb 1.6 oz)   08/22/22 82.1 kg (181 lb)   06/27/22 77.8 kg (171 lb 8.3 oz)   05/19/22 77.8 kg (171 lb 9.6 oz)   04/28/22 77.1 kg (170 lb)   03/15/22 80.6 kg (177 lb 9.6 oz)   11/27/21 70.8 kg (156 lb)           Constitutional.  Does not seem to be in any acute distress.  Eyes.  No redness or discharge noted.  Respiratory.  Speaking in full sentences.  Breathing seems comfortable without any accessory use of muscles.    Skin.  Visualized his skin does not show any obvious rashes.  Musculoskeletal.  Range of motion for visualized areas is intact.  Neurological.  Alert and oriented x3.  Psychiatric.  Mood, mentation and affect are normal.  Decision making capacity is intact.      The rest of a comprehensive physical examination is deferred due to Public Health Emergency video visit restrictions.      Laboratory Data:     Reviewed  11/28/2022  CBC unremarkable with hemoglobin 12.3.        Chemistry unremarkable except creatinine 1.04 and alkaline phosphatase 115.  Ferritin 121.  Iron 104.  TIBC 291 iron  saturation index 36%.  Vitamin B12 was 538.    8/23/2022.  CT chest PE protocol, CT abdomen and pelvis  No pulmonary embolism.  Mild infectious or inflammatory nodular infiltrate in the dependent left lower lobe.  Stable pulmonary nodules.  Postcholecystectomy. Biliary enteric anastomosis. Mild pneumobilia. Post Whipple procedure  Wall thickening of the urinary bladder. Prominent urothelial enhancement of the right ureter and renal pelvis. Mild asymmetric inflammatory stranding of the right kidney. Subtle striated cortical hypoenhancement of the right kidney.   Normal left kidney.  Normal lymph nodes.      2/8/2021.  EGD showed David en Y anatomy with generous pouch and stent bridging the GJ and proximal David   The stent was removed and a relook endoscopies showed widely patent prioximal David and associated superficial ulceration          Assessment and Plan:    Gastric adenocarcinona, HER-2 negative.   - Started on neoadjuvant FLOT on 3/7/2020. She has required a dose reduction of her oxaliplatin by 30% due to cold sensitivity/neuropathy with cycle #5. 5FU was decreased to 85% with cycle #6 due to mucositis/diarrhea/taste changes.   Received C#8 on 8/14/2020.  PET/CT on 9/2/2020 did not show evidence of disease.  Small peritoneal nodule was stable.  This was most consistent with fat necrosis.    CT chest abdomen and pelvis on 9/23/2020 also does not show evidence of malignancy recurrence and the peritoneal nodule in the right upper quadrant is stable.    Before definite surgery she had diagnostic laparoscopy which was negative for evidence of malignancy so on 10/21/2020 she had exploratory laparotomy with extensive lysis of additions, partial omentectomy, resection of Gastrojejunal Anastomosis w/ En Bloc Subtotal Gastrectomy and modified D2 Lymphadenectomy with David-en-Y Gastrojejunostomy Reconstruction.  Final pathology showed Focal residual poorly differentiated adenocarcinoma (0.4cm) with signet-ring morphology  status    post-neoadjuvant chemotherapy and all 9 lymph nodes were benign.  All margins were negative.  It was a [id number] lesion.    We decided to keep her on observation as she had received 6 months of neoadjuvant chemotherapy and then the definite surgery.     She is doing well and currently there is no evidence of recurrence of the cancer.  We will repeat CT chest abdomen and pelvis in 6 months and repeat blood work at that time.  EGD would be done on an as-needed basis.        Left lower lobe lung infiltrate.  Previously she had left lower lobe lung infiltrate which was likely infectious/inflammatory.  On repeat CT chest lungs are clear.  She does not have any respiratory complaints.  Continue to observe       Anemia.   She had iron deficiency anemia and she received INFeD on 10/16/2020.  Anemia resolved but she became anemic again.  Iron studies were unremarkable in May 2022.  B12 and folate were normal.  Erythropoietin was inappropriately low.    Her hemoglobin was low again when she was admitted for pyelonephritis but now anemia has resolved and iron studies and B12 are normal.  Continue to observe.      Port-A-Cath.  Port flushes every 8 weeks.    We did not address the following today.  Pyelonephritis.  E. coli bacteremia.  Repeat blood cultures were negative.  Now off antibiotics.  Initially there was a consideration to remove the Port-A-Cath but she improved and blood cultures became negative so port is still there.        Neuropathy.  She has chemotherapy related neuropathy.  In the past she tried acupuncture but it upset her nerve so she stopped it. She was intolerant to gabapentin and pregabalin. She is taking duloxetine and QID oxycodone. She is following with Palliative care.  We had previously discussed about trying laser therapy.        Abdominal pain.  This improved after EGD in December 2020 with a covered metal stent was placed from stomach to jejunum across the stenotic region of David/enteral  limb.  The stent was removed in February 2021.       Return to clinic in 6 months with labs/CT scan prior.    All questions answered and she is agreeable with the plan.    Magaly Pollard MD

## 2022-12-08 DIAGNOSIS — D49.0 IPMN (INTRADUCTAL PAPILLARY MUCINOUS NEOPLASM): ICD-10-CM

## 2022-12-08 DIAGNOSIS — R10.13 ABDOMINAL PAIN, EPIGASTRIC: ICD-10-CM

## 2022-12-08 DIAGNOSIS — C16.3 MALIGNANT NEOPLASM OF PYLORIC ANTRUM (H): ICD-10-CM

## 2022-12-11 NOTE — TELEPHONE ENCOUNTER
pantoprazole (PROTONIX) Refill   Last prescribing provider: Dr Pollard     Last clinic visit date: 12/1/22 Dr Pollard     Recommendations for requested medication (if none, N/A): Copied from chart note 12/1/22 Dr Pollard     pantoprazole (PROTONIX) 40 MG EC tablet TAKE 1 TABLET BY MOUTH 2 TIMES DAILY 90 tablet 1      Any other pertinent information (if none, N/A): N/A    Refilled: Y/N, if NO, why?

## 2022-12-12 DIAGNOSIS — M62.838 MUSCLE SPASM: ICD-10-CM

## 2022-12-12 RX ORDER — PANTOPRAZOLE SODIUM 40 MG/1
TABLET, DELAYED RELEASE ORAL
Qty: 60 TABLET | Refills: 2 | Status: SHIPPED | OUTPATIENT
Start: 2022-12-12 | End: 2023-03-13

## 2022-12-14 DIAGNOSIS — M25.532 LEFT WRIST PAIN: Primary | ICD-10-CM

## 2022-12-14 RX ORDER — CYCLOBENZAPRINE HCL 10 MG
TABLET ORAL
Qty: 90 TABLET | Refills: 3 | Status: SHIPPED | OUTPATIENT
Start: 2022-12-14 | End: 2023-04-05

## 2022-12-14 NOTE — TELEPHONE ENCOUNTER
Received [employer] message from patient requesting refill of flexeril.     Last office visit: 11/2/22  Scheduled for follow up 2/1/23     Will route request to MD for review.

## 2022-12-19 ENCOUNTER — OFFICE VISIT (OUTPATIENT)
Dept: ORTHOPEDICS | Facility: CLINIC | Age: 57
End: 2022-12-19
Payer: MEDICARE

## 2022-12-19 ENCOUNTER — ANCILLARY PROCEDURE (OUTPATIENT)
Dept: GENERAL RADIOLOGY | Facility: CLINIC | Age: 57
End: 2022-12-19
Attending: STUDENT IN AN ORGANIZED HEALTH CARE EDUCATION/TRAINING PROGRAM
Payer: MEDICARE

## 2022-12-19 ENCOUNTER — THERAPY VISIT (OUTPATIENT)
Dept: OCCUPATIONAL THERAPY | Facility: CLINIC | Age: 57
End: 2022-12-19
Payer: MEDICARE

## 2022-12-19 DIAGNOSIS — S52.572D OTHER CLOSED INTRA-ARTICULAR FRACTURE OF DISTAL END OF LEFT RADIUS WITH ROUTINE HEALING, SUBSEQUENT ENCOUNTER: Primary | ICD-10-CM

## 2022-12-19 DIAGNOSIS — M25.532 LEFT WRIST PAIN: ICD-10-CM

## 2022-12-19 DIAGNOSIS — M25.532 LEFT WRIST PAIN: Primary | ICD-10-CM

## 2022-12-19 PROCEDURE — 99024 POSTOP FOLLOW-UP VISIT: CPT | Performed by: STUDENT IN AN ORGANIZED HEALTH CARE EDUCATION/TRAINING PROGRAM

## 2022-12-19 PROCEDURE — 97110 THERAPEUTIC EXERCISES: CPT | Mod: GO | Performed by: OCCUPATIONAL THERAPIST

## 2022-12-19 PROCEDURE — 73110 X-RAY EXAM OF WRIST: CPT | Mod: LT | Performed by: RADIOLOGY

## 2022-12-19 NOTE — NURSING NOTE
Reason For Visit:   Chief Complaint   Patient presents with     Surgical Followup     12 wk POP left wrist ORIF DOS: 9/22/22       Primary MD: Alban Yuan  Ref. MD: [email or web address]    Age: 57 year old    ?  No      LMP 09/23/2014       Pain Assessment  Patient Currently in Pain: No (Left wrist pain with grabbing)    Hand Dominance Evaluation  Hand Dominance: Right          QuickDASH Assessment  QuickDASH Main 12/12/2022   1. Open a tight or new jar. Severe difficulty   2. Do heavy household chores (e.g., wash walls, floors) Severe difficulty   3. Carry a shopping bag or briefcase. Mild difficulty   4. Wash your back. Moderate difficulty   5. Use a knife to cut food. Moderate difficulty   6. Recreational activities in which you take some force or impact through your arm, shoulder or hand (e.g., golf, hammering, tennis, etc.). Severe difficulty   7. During the past week, to what extent has your arm, shoulder or hand problem interfered with your normal social activities with family, friends, neighbours or groups? Not at all   8. During the past week, were you limited in your work or other regular daily activities as a result of your arm, shoulder or hand problem? Not limited at all   9. Arm, shoulder or hand pain. Mild   10.Tingling (pins and needles) in your arm,shoulder or hand. Mild   11. During the past week, how much difficulty have you had sleeping because of the pain in your arm, shoulder or hand? Mild difficulty   Quickdash Ability Score 38.64          Current Outpatient Medications   Medication Sig Dispense Refill     amitriptyline (ELAVIL) 75 MG tablet Take 1 tablet (75 mg) by mouth At Bedtime 30 tablet 3     Cyanocobalamin 1000 MCG CAPS Take 1 tablet by mouth every morning (Patient not taking: Reported on 11/2/2022)       cyclobenzaprine (FLEXERIL) 10 MG tablet TAKE 1 TABLET BY MOUTH 3 TIMES DAILY AS NEEDED FOR MUSCLE SPASMS 90 tablet 3     DULoxetine (CYMBALTA) 30 MG capsule Take 2 capsules (60 mg) by  mouth every morning       famotidine (PEPCID) 20 MG tablet TAKE 1 TABLET (20 MG) BY MOUTH 2 TIMES DAILY AS NEEDED 60 tablet 3     fluticasone (FLONASE) 50 MCG/ACT nasal spray Spray 1 spray into both nostrils daily at 2 pm       hydrOXYzine (VISTARIL) 25 MG capsule Take 25-50 mg by mouth 4 times daily as needed for itching or anxiety       loperamide (IMODIUM) 2 MG capsule Take 4 mg by mouth 4 times daily as needed for diarrhea (Patient not taking: Reported on 11/2/2022)       loratadine-pseudoePHEDrine (CLARITIN-D 24-HOUR)  MG 24 hr tablet Take 1 tablet by mouth daily as needed for allergies Usually takes in Spring/Fall       ondansetron (ZOFRAN) 8 MG tablet Take 1 tablet (8 mg) by mouth every 8 hours as needed (Nausea/Vomiting) 90 tablet 2     oxyCODONE (ROXICODONE) 5 MG tablet Take 1 tablet (5 mg) by mouth 4 times daily as needed for moderate to severe pain Maximum 4 pills/day. 120 tablet 0     pantoprazole (PROTONIX) 40 MG EC tablet TAKE 1 TABLET BY MOUTH TWICE A DAY 60 tablet 2     sennosides (SENOKOT) 8.6 MG tablet 1-2 tablets 1-2 times daily as needed. 120 tablet 1     simethicone 80 MG TABS Take 1 tablet by mouth every 6 hours as needed (bloating, gas, belching) 90 tablet 3     SUMAtriptan (IMITREX) 100 MG tablet Take 100 mg by mouth at onset of headache for migraine         Allergies   Allergen Reactions     Gluten Meal Palpitations     Augmentin Rash     Patient tolerated Zosyn in 7/2020     Oxycontin [Oxycodone] Other (See Comments)     Confusion, loopy, oxycodone is tolerated.    Pt states she tolerates regular oxycodone, cannot take 12 hour oxycontin.       Pregabalin      interfered with Cymbalta     Topiramate      Tongue numbness, taste alteration, irritable      Acetaminophen Nausea     Urine retention       Dust Mite Extract      Gabapentin Dizziness and GI Disturbance     Ketorolac Headache     Latex Rash     Methadone Fatigue     Mold      Naprosyn [Naproxen] Dizziness and GI Disturbance      Seasonal Allergies        JUANITA Easley

## 2022-12-19 NOTE — PROGRESS NOTES
Orthopaedic Surgery Hand Clinic Progress Note    Patient Name: Nathalie Bashir  MRN: [id number]  : 1965  Date: Dec 19, 2022    Date of Surgery: 22    Surgery Performed: ORIF left distal radius    Subjective:  Ms. Nathalie Bashir is a 56 year old female who returns 3 months days s/p above. Doing well. No pain. Excellent ROM. Occasionally some aching after driving long durations and using a removable wrist brace when that happens.  No new numbness/tingling.    Objective:    General: alert, appropriate, NAD  HEENT: NC/AT  CV: RRR by pulse  Pulm: Unlabored on RA  MSK:  Incision c/d/i  No erythema,, drainage, evidence of infection  Mild crepitus over the volar incision with finger flexion/extension  No pain  Full wrist flexion, extension, pronation, supination  5 out of 5 EPL, FPL, and intrinsics  Sensation intact to light touch in the median, radial, ulnar nerve distributions  Warm well-perfused, capillary refill less than 2 seconds    Imaging:  X-rays of left wrist reviewed by me demonstrates postsurgical changes status post surgical fixation of left distal radius with maintenance of reduction and hardware in appropriate position. Full healed.    Assessment/Plan:  56-year-old female status post ORIF left distal radius 2022.  Neurovascular intact.  Progressing appropriately.     WBAT no restrictions    If having swelling or discomfort at the wrist in the future, she will let me know. May consider plate removal if symptomatic.    All questions answered.  She voiced understanding and agreement. Follow-up as needed.    Mark Villalobos MD    Hand, Upper Extremity & Microvascular Surgery  Department of Orthopedic Surgery  [facility]

## 2022-12-19 NOTE — PROGRESS NOTES
"Discharge Note - Hand Therapy    Current Date:  12/19/2022    Subjective:     Was able to do patricia cookies, including rolling, etc.  Uses the L to do things in the kitchen. Uses it to help bring the laundry basket down stairs.      Objective:  Pain Level (Scale 0-10)   10/3/2022 11/14/2022   12/19/2022     At Rest 5/10  Depends on activity  2 Can tell if she does too much  0   With Use 5/10   0     Pain Description  Date 10/3/2022    Location L wrist (circumferential)     Pain Quality Aching and Sharp    Frequency intermittent or constant      Pain is worst  daytime or nighttime    Exacerbated by  with use     Relieved by cold and rest    Progression Unchanged       Sensation    B hand neuropathy per patient; no changes noted     Scar    Steri-strips intact; no signs of infection or drainage.     Edema  Mild swelling to ulnar spect of L wrist;     ROM  Pain Report:  -none + mild    ++ moderate    +++ severe     Wrist 10/3/2022 10/3/2022 11/14/2022   12/19/2022     AROM (PROM) R L L L   Extension 65 40,  5/10 58 WNL   Flexion 40 28, 5/10  47 \"   RD 20 18, 5/10  WFL \"   UD 50 35, 5/10  WFL \"   Supination 60 48, 5/10  75 \"   Pronation WFL  WFL  75 \"     L Digit II-V: able to make a loose fist and fully extend digits     Thumb 10/3/2022 10/3/2022 12/19/2022     AROM (PROM) R L L   MP 0/50 0/30 WNL   IP 0/60 0/30 \"   RABD 55 55 \"   PABD 57 58 \"   Kapandji Opposition Scale (0-10/10) 10  9 10     Strength   (Measured in pounds)    12/19/2022   Trials R L   1   62 56       Assessment:  Response to therapy has been improvement to:  ROM of Wrist:  All Planes  Strength:   and pinch and wrist strength  Appropriateness of Rx I have re-evaluated this patient and find that the nature, scope, duration and intensity of the therapy is appropriate for the medical condition of the patient.  Overall Assessment:  Patient is progressing well.  Patient is ready to be discharged from therapy and continue their home treatment " program.  STG/LTG:  See goal sheet for details and updates.    Plan:  Frequency/Duration:  Discharge from Hand Therapy; continue home program (2# wrist extension isotonics, and continue with ADL/IADL as functional strengthening.

## 2022-12-19 NOTE — LETTER
2022         RE: Nathalie Bashir  [address] [facility] [address]        Dear Colleague,    Thank you for referring your patient, Nathalie Bashir, to the [facility] [place]. Please see a copy of my visit note below.    Orthopaedic Surgery Hand Clinic Progress Note    Patient Name: Nathalie Bashir  MRN: [id number]  : 1965  Date: Dec 19, 2022    Date of Surgery: 22    Surgery Performed: ORIF left distal radius    Subjective:  Ms. Nathalie Bashir is a 56 year old female who returns 3 months days s/p above. Doing well. No pain. Excellent ROM. Occasionally some aching after driving long durations and using a removable wrist brace when that happens.  No new numbness/tingling.    Objective:    General: alert, appropriate, NAD  HEENT: NC/AT  CV: RRR by pulse  Pulm: Unlabored on RA  MSK:  Incision c/d/i  No erythema,, drainage, evidence of infection  Mild crepitus over the volar incision with finger flexion/extension  No pain  Full wrist flexion, extension, pronation, supination  5 out of 5 EPL, FPL, and intrinsics  Sensation intact to light touch in the median, radial, ulnar nerve distributions  Warm well-perfused, capillary refill less than 2 seconds    Imaging:  X-rays of left wrist reviewed by me demonstrates postsurgical changes status post surgical fixation of left distal radius with maintenance of reduction and hardware in appropriate position. Full healed.    Assessment/Plan:  56-year-old female status post ORIF left distal radius 2022.  Neurovascular intact.  Progressing appropriately.     WBAT no restrictions    If having swelling or discomfort at the wrist in the future, she will let me know. May consider plate removal if symptomatic.    All questions answered.  She voiced understanding and agreement. Follow-up as needed.    Mark Villalobos MD    Hand, Upper Extremity & Microvascular Surgery  Department of Orthopedic  Surgery  [facility]

## 2022-12-20 DIAGNOSIS — M79.2 NEUROPATHIC PAIN: ICD-10-CM

## 2022-12-20 DIAGNOSIS — G89.3 CHRONIC PAIN DUE TO NEOPLASM: ICD-10-CM

## 2022-12-20 RX ORDER — OXYCODONE HYDROCHLORIDE 5 MG/1
5 TABLET ORAL 4 TIMES DAILY PRN
Qty: 120 TABLET | Refills: 0 | Status: SHIPPED | OUTPATIENT
Start: 2022-12-23 | End: 2023-01-16

## 2022-12-20 NOTE — TELEPHONE ENCOUNTER
Received voicemail from patient requesting refill of oxycodone.     Last refill: 11/30/22  Last office visit: 11/2/22  Scheduled for follow up 2/1/23     Will route request to NP for review.     Reviewed MN  Report.    Pt leaving [facility] 12/23, returning early January. Requesting a fill on 12/23 before she leaves.

## 2022-12-29 ENCOUNTER — LAB (OUTPATIENT)
Dept: LAB | Facility: HOSPITAL | Age: 57
End: 2022-12-29
Payer: MEDICARE

## 2022-12-29 DIAGNOSIS — R39.89 SUSPECTED UTI: ICD-10-CM

## 2022-12-29 LAB
ALBUMIN UR-MCNC: 70 MG/DL
APPEARANCE UR: ABNORMAL
BILIRUB UR QL STRIP: NEGATIVE
COLOR UR AUTO: YELLOW
GLUCOSE UR STRIP-MCNC: NEGATIVE MG/DL
HGB UR QL STRIP: ABNORMAL
KETONES UR STRIP-MCNC: NEGATIVE MG/DL
LEUKOCYTE ESTERASE UR QL STRIP: ABNORMAL
NITRATE UR QL: POSITIVE
PH UR STRIP: 6.5 [PH] (ref 5–7)
SP GR UR STRIP: 1.02 (ref 1–1.03)
UROBILINOGEN UR STRIP-MCNC: 2 MG/DL

## 2022-12-29 PROCEDURE — 81003 URINALYSIS AUTO W/O SCOPE: CPT | Mod: QW

## 2022-12-29 PROCEDURE — 87086 URINE CULTURE/COLONY COUNT: CPT

## 2022-12-31 LAB — BACTERIA UR CULT: ABNORMAL

## 2023-01-02 DIAGNOSIS — R39.89 SUSPECTED UTI: Primary | ICD-10-CM

## 2023-01-02 RX ORDER — SULFAMETHOXAZOLE/TRIMETHOPRIM 800-160 MG
1 TABLET ORAL 2 TIMES DAILY
Qty: 10 TABLET | Refills: 0 | Status: SHIPPED | OUTPATIENT
Start: 2023-01-02 | End: 2023-03-30

## 2023-01-11 ENCOUNTER — OFFICE VISIT (OUTPATIENT)
Dept: NEUROLOGY | Facility: CLINIC | Age: 58
End: 2023-01-11
Payer: MEDICARE

## 2023-01-11 DIAGNOSIS — R26.89 BALANCE PROBLEMS: Primary | ICD-10-CM

## 2023-01-11 PROCEDURE — 95908 NRV CNDJ TST 3-4 STUDIES: CPT | Performed by: PSYCHIATRY & NEUROLOGY

## 2023-01-11 PROCEDURE — 95886 MUSC TEST DONE W/N TEST COMP: CPT | Performed by: PSYCHIATRY & NEUROLOGY

## 2023-01-11 NOTE — LETTER
2023       RE: Nathalie Bashir  [address] [facility] [address]     Dear Colleague,    Thank you for referring your patient, Nathalie Bashir, to the [facility] EMG CLIN[facility] [place] [facility]. Please see a copy of my visit note below.                        [facility]  Electrodiagnostic Laboratory                 Department of Neurology                                                                                                         Test Date:  2023    Patient: Nathalie Bashir : 1965 Physician: Jaycob Avalos MD   Sex: Female AGE: 57 year Ref Phys:    ID#: [id number]   Technician: Kristy Behling     History and Examination:  Nathalie Bashir is a 57 year old woman with an established polyneuropathy. This is presumed due to chemotherapy, although she has a lifelong history of hammertoe deformities with no family history of polyneuropathy. She is referred for evaluation of possible left lumbosacral radiculopathy.    Techniques:  Motor conduction studies were done with surface recording electrodes. Sensory conduction studies were performed with surface electrodes, unless indicated otherwise by (n), designating the use of subdermal recording electrodes. Temperature was monitored and recorded throughout the study. Upper extremities were maintained at a temperature of 32 degrees Centigrade or higher.  EMG was done with a concentric needle electrode.     Results:  Left superficial fibular and sural sensory nerve action potentials were absent. Left fibular and tibial compound muscle action potential amplitudes were moderately attenuated. Electromyography demonstrated rare fibrillation potentials in the peroneus tertius muscle and was otherwise normal.     Interpretation:  This is an abnormal study, demonstrating electrophysiologic evidence of the followin. Moderately severe sensorimotor axonal polyneuropathy.  2. No evidence  of left lumbosacral radiculopathy.      _____________________________  Jaycob Avalos MD  Board Certified in Clinical Neurophysiology and Neuromuscular Medicine        Nerve Conduction Studies  Motor Sites      Latency Amplitude Neg. Amp Diff Distance Velocity Neg. Dur Neg Area Diff Temperature Comment   Site (ms) Norm (mV) Norm % cm m/s Norm ms %  C    Left Dp Branch Fibular (TA) Motor   Fib Head 3.0  < 6.0 3.2 -     9.9  30.5    Pop Fossa 4.9  < 5.7 3.3 - 3.1 10 53 - 9.7 5.1 30.5    Left Fibular (EDB) Motor   Ankle 4.8  < 6.0 0.92  > 2.5  8   6.8  30.1    Bel Fib Head 13.1 - 0.54 - -41.3 32 39  > 38 7.9 -22.9 30.1    Pop Fossa 15.3 - 0.52 - -3.7 10 45  > 38 7.7 0 30.1    Left Tibial (AHB) Motor   Ankle 4.4  < 6.5 1.59  > 4.4  8   7.5  31.6    Knee 14.9 - 0.95 - -40.3 42 40  > 38 9.1 -42.9 31.4      Sensory Sites      Onset Lat Peak Lat Amp (O-P) Amp (P-P) Segment Distance Velocity Temperature   Site ms ms  V Norm  V  cm m/s Norm  C   Left Superficial Fibular Sensory   14 cm-Ankle NR NR NR  > 3 NR 14 cm-Ankle 12.5 NR  > 38 30.5   Left Sural Sensory   Calf-Lat Mall NR NR NR  > 5 NR Calf-Lat Mall 14 NR  > 38 30.4     F Wave Studies     Min-F Max-F Dispersion Persistence Mean-F F-Norm L-R Mean-F L-R Mean-F Norm F/M Ratio F-M Lat (ms)   Left Tibial (Abd Hallucis)  30.8  C   53.13 59.53 6.40 100.00 57.58 <61  <5.7 187.99 37.34       Electromyography     Side Muscle Ins Act Fibs/PSW Fasc HF Amp Dur Poly Recrt Int Pat   Left Tib Anterior Nml None Nml 0 Nml Nml 0 Nml Nml   Left Gastroc MH Nml None Nml 0 Nml Nml 0 Nml Nml   Left Tib Posterior Nml None Nml 0 Nml Nml 0 Nml Nml   Left Peroneus Tertius Nml 1+ Nml 0 Nml Nml 0 Nml Nml   Left Vastus Lat Nml None Nml 0 Nml Nml 0 Nml Nml   Left Biceps Fem SH Nml None Nml 0 Nml Nml 0 Nml Nml   Left Biceps Fem LH Nml None Nml 0 Nml Nml 0 Nml Nml   Left Gluteus Med Nml None Nml 0 Nml Nml 0 Nml Nml         NCS Waveforms:    Motor           Sensory                    Sincerely,    Jaycob  MD Bailey

## 2023-01-11 NOTE — PROGRESS NOTES
[facility]  Electrodiagnostic Laboratory                 Department of Neurology                                                                                                         Test Date:  2023    Patient: Nathalie Bashir : 1965 Physician: Jaycob Avalos MD   Sex: Female AGE: 57 year Ref Phys:    ID#: [id number]   Technician: Kristy Behling     History and Examination:  Nathalie Bashir is a 57 year old woman with an established polyneuropathy. This is presumed due to chemotherapy, although she has a lifelong history of hammertoe deformities with no family history of polyneuropathy. She is referred for evaluation of possible left lumbosacral radiculopathy.    Techniques:  Motor conduction studies were done with surface recording electrodes. Sensory conduction studies were performed with surface electrodes, unless indicated otherwise by (n), designating the use of subdermal recording electrodes. Temperature was monitored and recorded throughout the study. Upper extremities were maintained at a temperature of 32 degrees Centigrade or higher.  EMG was done with a concentric needle electrode.     Results:  Left superficial fibular and sural sensory nerve action potentials were absent. Left fibular and tibial compound muscle action potential amplitudes were moderately attenuated. Electromyography demonstrated rare fibrillation potentials in the peroneus tertius muscle and was otherwise normal.     Interpretation:  This is an abnormal study, demonstrating electrophysiologic evidence of the followin. Moderately severe sensorimotor axonal polyneuropathy.  2. No evidence of left lumbosacral radiculopathy.      _____________________________  Jaycob Avalos MD  Board Certified in Clinical Neurophysiology and Neuromuscular Medicine        Nerve Conduction Studies  Motor Sites      Latency Amplitude Neg. Amp Diff Distance Velocity Neg. Dur Neg Area Diff Temperature Comment   Site  (ms) Norm (mV) Norm % cm m/s Norm ms %  C    Left Dp Branch Fibular (TA) Motor   Fib Head 3.0  < 6.0 3.2 -     9.9  30.5    Pop Fossa 4.9  < 5.7 3.3 - 3.1 10 53 - 9.7 5.1 30.5    Left Fibular (EDB) Motor   Ankle 4.8  < 6.0 0.92  > 2.5  8   6.8  30.1    Bel Fib Head 13.1 - 0.54 - -41.3 32 39  > 38 7.9 -22.9 30.1    Pop Fossa 15.3 - 0.52 - -3.7 10 45  > 38 7.7 0 30.1    Left Tibial (AHB) Motor   Ankle 4.4  < 6.5 1.59  > 4.4  8   7.5  31.6    Knee 14.9 - 0.95 - -40.3 42 40  > 38 9.1 -42.9 31.4      Sensory Sites      Onset Lat Peak Lat Amp (O-P) Amp (P-P) Segment Distance Velocity Temperature   Site ms ms  V Norm  V  cm m/s Norm  C   Left Superficial Fibular Sensory   14 cm-Ankle NR NR NR  > 3 NR 14 cm-Ankle 12.5 NR  > 38 30.5   Left Sural Sensory   Calf-Lat Mall NR NR NR  > 5 NR Calf-Lat Mall 14 NR  > 38 30.4     F Wave Studies     Min-F Max-F Dispersion Persistence Mean-F F-Norm L-R Mean-F L-R Mean-F Norm F/M Ratio F-M Lat (ms)   Left Tibial (Abd Hallucis)  30.8  C   53.13 59.53 6.40 100.00 57.58 <61  <5.7 187.99 37.34       Electromyography     Side Muscle Ins Act Fibs/PSW Fasc HF Amp Dur Poly Recrt Int Pat   Left Tib Anterior Nml None Nml 0 Nml Nml 0 Nml Nml   Left Gastroc MH Nml None Nml 0 Nml Nml 0 Nml Nml   Left Tib Posterior Nml None Nml 0 Nml Nml 0 Nml Nml   Left Peroneus Tertius Nml 1+ Nml 0 Nml Nml 0 Nml Nml   Left Vastus Lat Nml None Nml 0 Nml Nml 0 Nml Nml   Left Biceps Fem SH Nml None Nml 0 Nml Nml 0 Nml Nml   Left Biceps Fem LH Nml None Nml 0 Nml Nml 0 Nml Nml   Left Gluteus Med Nml None Nml 0 Nml Nml 0 Nml Nml         NCS Waveforms:    Motor           Sensory

## 2023-01-14 DIAGNOSIS — C16.3 MALIGNANT NEOPLASM OF PYLORIC ANTRUM (H): ICD-10-CM

## 2023-01-14 DIAGNOSIS — R10.13 ABDOMINAL PAIN, EPIGASTRIC: ICD-10-CM

## 2023-01-16 ENCOUNTER — MYC REFILL (OUTPATIENT)
Dept: PALLIATIVE CARE | Facility: CLINIC | Age: 58
End: 2023-01-16

## 2023-01-16 DIAGNOSIS — M79.2 NEUROPATHIC PAIN: ICD-10-CM

## 2023-01-16 DIAGNOSIS — G89.3 CHRONIC PAIN DUE TO NEOPLASM: ICD-10-CM

## 2023-01-16 RX ORDER — FAMOTIDINE 20 MG/1
20 TABLET, FILM COATED ORAL 2 TIMES DAILY PRN
Qty: 180 TABLET | Refills: 1 | Status: SHIPPED | OUTPATIENT
Start: 2023-01-16 | End: 2023-07-14

## 2023-01-16 RX ORDER — OXYCODONE HYDROCHLORIDE 5 MG/1
5 TABLET ORAL 4 TIMES DAILY PRN
Qty: 120 TABLET | Refills: 0 | Status: SHIPPED | OUTPATIENT
Start: 2023-01-16 | End: 2023-02-14

## 2023-01-16 NOTE — TELEPHONE ENCOUNTER
Received [employer] message from patient requesting refill of oxycodone.     Last refill: 12/21/22  Last office visit: 11/2/22  Scheduled for follow up 2/1/23     Will route request to MD for review.     Reviewed MN  Report.

## 2023-01-24 ENCOUNTER — VIRTUAL VISIT (OUTPATIENT)
Dept: UROLOGY | Facility: CLINIC | Age: 58
End: 2023-01-24
Payer: MEDICARE

## 2023-01-24 VITALS — HEIGHT: 69 IN | BODY MASS INDEX: 25.18 KG/M2 | WEIGHT: 170 LBS

## 2023-01-24 DIAGNOSIS — C16.2 MALIGNANT NEOPLASM OF BODY OF STOMACH (H): ICD-10-CM

## 2023-01-24 DIAGNOSIS — N39.0 RECURRENT UTI: ICD-10-CM

## 2023-01-24 DIAGNOSIS — N39.0 RECURRENT UTI: Primary | ICD-10-CM

## 2023-01-24 PROCEDURE — 99214 OFFICE O/P EST MOD 30 MIN: CPT | Mod: 95 | Performed by: UROLOGY

## 2023-01-24 ASSESSMENT — PAIN SCALES - GENERAL: PAINLEVEL: MILD PAIN (2)

## 2023-01-24 NOTE — PROGRESS NOTES
"PT WILL MEET YOU IN IVDeskT  PT WILL LOG INTO [employer] AT 12:30P    Nathalie is a 57 year old who is being evaluated via a billable video visit.      How would you like to obtain your AVS? [employer]  If the video visit is dropped, the invitation should be resent by: Text to cell phone: [phone]  Will anyone else be joining your video visit? No        Video-Visit Details    Type of service:  Video Visit   Video Start Time: 12:52 PM  Video End Time:1:02 PM    Originating Location (pt. Location): Home    Distant Location (provider location):  On-site  Platform used for Video Visit: [employer]     January 24, 2023    Nathalie was seen today for other.    Diagnoses and all orders for this visit:    Recurrent UTI  -     conjugated estrogens (PREMARIN) 0.625 MG/GM vaginal cream; Place 1 g vaginally three times a week    Malignant neoplasm of body of stomach (H)       Trial of premarin to see if cheaper.  If not then will see if the compounded estrogen more affordable    RTC 3 months, sooner if needed    10 minutes were spent today on the day of the encounter in reviewing the EMR, direct patient care including prescription medications, coordination of care and documentation    Cailin Velez MD MPH  (she/her/hers)   of Urology  [facility]      Subjective    Here for UTI follow up.  Not as frequent but still 3 UTIs since last visit.  Did not start estrogen cream because of it is too expensive    Thinks some of it related to her constipation from the gastric cancer and whipple.  Taking senna every day    States she also found that her father used to have UTIs too.    Nocturia, drinking water up to bed time.    She denies any changes in health since last visit    Ht 1.753 m (5' 9\")   Wt 77.1 kg (170 lb)   LMP 09/23/2014   BMI 25.10 kg/m    GENERAL: healthy, alert and no distress  EYES: Eyes grossly normal to inspection, conjunctivae and sclerae normal  HENT: normal cephalic/atraumatic.  External " ears, nose and mouth without ulcers or lesions.  RESP: no audible wheeze, cough, or visible cyanosis.  No visible retractions or increased work of breathing.  Able to speak fully in complete sentences.  NEURO: Cranial nerves grossly intact, mentation intact and speech normal  PSYCH: mentation appears normal, affect normal/bright, judgement and insight intact, normal speech and appearance well-groomed    CC  Patient Care Team:  Alban Yuan MD as PCP - General (Neurology)  Arnol Santiago MD as MD (Orthopedics)  Magaly Pollard MD as MD (Hematology & Oncology)  Magaly Pollard MD as Assigned Cancer Care Provider  Vandana Mari, RN as Specialty Care Coordinator (Hematology & Oncology)  Beverly Finn PA-C (Inactive) as Physician Assistant (Gastroenterology)  Wendy Oliveira PA as Physician Assistant (Urology)  Cailin Velez MD as Assigned Surgical Provider  Apurva Dobbs MD as Assigned Neuroscience Provider  Karla Millard DO as Assigned Palliative Care Provider  Mark Villalobos MD as Assigned Musculoskeletal Provider  SELF, REFERRED

## 2023-01-24 NOTE — PATIENT INSTRUCTIONS
Websites with free information:    American Urogynec[employer] Society patient website: www.voicesforpfd.org    Total Control Program: www.totalcontrolprogram.com    Supplements to prevent UTI to consider  -Probiotics  -Cranberry (for these products let them know a doctor is recommending them)   Ellura: www.myellura.[employer]   Theracran HP by [employer] [facility] [address]  -d-mannose 2gm daily  -Vitamin C 500-1000mg twice a day    It was a pleasure meeting with you today.  Thank you for allowing me and my team the privilege of caring for you today.  YOU are the reason we are here, and I truly hope we provided you with the excellent service you deserve.  Please let us know if there is anything else we can do for you so that we can be sure you are leaving completely satisfied with your care experience.

## 2023-01-24 NOTE — LETTER
1/24/2023       RE: Nathalie Bashir  [address] [facility] [address]     Dear Colleague,    Thank you for referring your patient, Nathalie Bashir, to the [facility] [place] at [facility]. Please see a copy of my visit note below.    PT WILL MEET YOU IN [employer]  PT WILL LOG INTO [employer] AT 12:30P    Nathalie is a 57 year old who is being evaluated via a billable video visit.      How would you like to obtain your AVS? [employer]  If the video visit is dropped, the invitation should be resent by: Text to cell phone: [phone]  Will anyone else be joining your video visit? No        Video-Visit Details    Type of service:  Video Visit   Video Start Time: 12:52 PM  Video End Time:1:02 PM    Originating Location (pt. Location): Home    Distant Location (provider location):  On-site  Platform used for Video Visit: [facility]     January 24, 2023    Nathalie was seen today for other.    Diagnoses and all orders for this visit:    Recurrent UTI  -     conjugated estrogens (PREMARIN) 0.625 MG/GM vaginal cream; Place 1 g vaginally three times a week    Malignant neoplasm of body of stomach (H)       Trial of premarin to see if cheaper.  If not then will see if the compounded estrogen more affordable    RTC 3 months, sooner if needed    10 minutes were spent today on the day of the encounter in reviewing the EMR, direct patient care including prescription medications, coordination of care and documentation    Cailin Velez MD MPH  (she/her/hers)   of Urology  [facility]      Subjective    Here for UTI follow up.  Not as frequent but still 3 UTIs since last visit.  Did not start estrogen cream because of it is too expensive    Thinks some of it related to her constipation from the gastric cancer and whipple.  Taking senna every day    States she also found that her father used to have UTIs too.    Nocturia, drinking water up to  "bed time.    She denies any changes in health since last visit    Ht 1.753 m (5' 9\")   Wt 77.1 kg (170 lb)   LMP 09/23/2014   BMI 25.10 kg/m    GENERAL: healthy, alert and no distress  EYES: Eyes grossly normal to inspection, conjunctivae and sclerae normal  HENT: normal cephalic/atraumatic.  External ears, nose and mouth without ulcers or lesions.  RESP: no audible wheeze, cough, or visible cyanosis.  No visible retractions or increased work of breathing.  Able to speak fully in complete sentences.  NEURO: Cranial nerves grossly intact, mentation intact and speech normal  PSYCH: mentation appears normal, affect normal/bright, judgement and insight intact, normal speech and appearance well-groomed    CC  Patient Care Team:  Alban uYan MD as PCP - General (Neurology)  rAnol Santiago MD as MD (Orthopedics)  Magaly Pollard MD as MD (Hematology & Oncology)  Magaly Pollard MD as Assigned Cancer Care Provider  Vandana Mari RN as Specialty Care Coordinator (Hematology & Oncology)  Beverly Finn PA-C (Inactive) as Physician Assistant (Gastroenterology)  Wendy Oliveira PA as Physician Assistant (Urology)  Cailin Velez MD as Assigned Surgical Provider  Apurva Dobbs MD as Assigned Neuroscience Provider  Karla Millard DO as Assigned Palliative Care Provider  Mark Villalobos MD as Assigned Musculoskeletal Provider  SELF, REFERRED      "

## 2023-01-26 ENCOUNTER — TELEPHONE (OUTPATIENT)
Dept: UROLOGY | Facility: CLINIC | Age: 58
End: 2023-01-26
Payer: MEDICARE

## 2023-01-26 NOTE — TELEPHONE ENCOUNTER
----- Message from Gale Andrews sent at 1/25/2023  8:34 AM CST -----  Regarding: 3 month follow up  Return in about 3 months (around 4/24/2023).    CSF  1/24/23

## 2023-01-27 DIAGNOSIS — N39.0 RECURRENT UTI: Primary | ICD-10-CM

## 2023-01-27 DIAGNOSIS — N95.2 ATROPHIC VAGINITIS: ICD-10-CM

## 2023-01-27 DIAGNOSIS — N39.0 RECURRENT UTI: ICD-10-CM

## 2023-01-27 RX ORDER — CONJUGATED ESTROGENS 0.62 MG/G
CREAM VAGINAL
Qty: 30 G | Refills: 3 | OUTPATIENT
Start: 2023-01-27

## 2023-01-31 NOTE — PROGRESS NOTES
Nathalie is a 57 year old who is being evaluated via a billable video visit.  [place] in [place].    How would you like to obtain your AVS? MyChart  If the video visit is dropped, the invitation should be resent by: Text to cell phone: [phone]  Will anyone else be joining your video visit? CISCO Madden          Palliative Care Progress Note    Patient Name: Nathalie Bashir  Primary Provider: Alban Yuan    Chief Complaint/Patient ID: 58 yo F with hx of gastric adenocarcinoma              - Found during Whipple 1/2020 for presumed IPMN - no pancreatic malignancy found, but had poorly differentiated adenoca.  Received neoadjuvant chemo (5FU, leucovorin, oxaliplatin, docetaxel) with curative-intent subtotal gastrectomy, David-en-Y gastrojejunostomy 10/2020.  Required stenting of the jejunum for stenosis causing pain, removed 2/8/21.      Chronic and chemo-induced neuropathy - intol of gabapentin, pregabalin. Duloxetine somewhat effective, on TID/QID oxycodone      Last Palliative care appointment: 11/2/22 with Dr. Gill.      Reviewed: Yes    Interim History:  Nathalie Bashir is a 57 year old female who is seen today for follow up with Palliative Care via billable video visit.      Had EMG from Neurology 1/11/23. Positive for moderately severe sensorimotor axonal polyneuropathy. No evidence of left lumbosacral radiculopathy.    Doing a little bit better now.  Was a very busy fall with her hospitalization and her 's gastric surgery.    Continues to have significant neuropathy symptoms.  Primarily has pain in her calves down to her feet that worsens with long periods of standing.  She needs to take frequent rests.  Tried compression socks and was not sure they were helpful.  States she only had 1 set 1 up to her ankles.  She does occasionally get pain in her hands, but notes more numbness symptoms that affect her ability to hold things.  Has been taking oxycodone 5 mg essentially 4  times every day.    She does think she saw a rheumatology several years ago but cannot remember the details of this.  He did have a history of chronic neck pain and had seen the pain clinic as well.  He was previously on a buprenorphine patch-does not remember the dose.  It seems like this was maybe around 2019.  Reports feeling dizzy on it and was not sure it helped the neck pain she had at that time.    Has not followed up with PM&R.     Social History:  Lives with her . Has a son and 3 grandchildren  Retired, worked in special education.  Social History     Tobacco Use     Smoking status: Former     Packs/day: 0.50     Years: 25.00     Pack years: 12.50     Types: Cigarettes     Quit date: 1/1/2020     Years since quitting: 3.0     Smokeless tobacco: Never   Substance Use Topics     Alcohol use: No     Drug use: No       Family History- Reviewed in Epic.    Allergies   Allergen Reactions     Gluten Meal Palpitations     Augmentin Rash     Patient tolerated Zosyn in 7/2020     Oxycontin [Oxycodone] Other (See Comments)     Confusion, loopy, oxycodone is tolerated.    Pt states she tolerates regular oxycodone, cannot take 12 hour oxycontin.       Pregabalin      interfered with Cymbalta     Topiramate      Tongue numbness, taste alteration, irritable      Acetaminophen Nausea     Urine retention       Dust Mite Extract      Gabapentin Dizziness and GI Disturbance     Ketorolac Headache     Latex Rash     Methadone Fatigue     Mold      Naprosyn [Naproxen] Dizziness and GI Disturbance     Seasonal Allergies        Medications- Reviewed in Epic.    Past Medical History- Reviewed in [facility].    Past Surgical History- Reviewed in Epic.    Review of Systems:   ROS: 10 point ROS neg other than the symptoms noted above in the HPI.      Physical Exam:   Constitutional: Alert, pleasant, no apparent distress. Sitting up in chair.  Eyes: Sclera non-icteric, no eye discharge.  ENT: No nasal discharge. Ears grossly  normal.  Respiratory: Unlabored respirations. Speaking in full sentences.  Musculoskeletal: Extremities appear normal- no gross deformities noted. No edema noted on upper body.   Skin: No suspicious lesions or rashes on visible skin.  Neurologic: Clear speech, no aphasia. No facial droop.  Psychiatric: Mentation appears normal, appropriate attention. Affect normal/bright. Does not appear anxious or depressed.      Key Data Reviewed:  LABS: 11/28/22- Cr 1.04, GFR 63, Albumin 3.9, LFTs wnl. Hgb 12.3, Plts 223.     IMAGING: CT CAP 11/28/22- IMPRESSION: No evidence of recurrent or metastatic disease in the chest, abdomen, or pelvis.    Impression & Recommendations & Counseling:  Nathalie Bashir is a 57 year old female with history of gastric adenocarcinoma found during [facility]ipple 1/2020 for presumed IPMN, no pancreatic malignancy found at that time, now s/p neoadjuvant chemotherapy and curative intent surgery 10/2020.  Is now on surveillance and feeling more back to normal.  Seen for symptom management of peripheral neuropathy that worsened in the setting of chemo.      Pain - Likely multifactorial.  Neuropathic pain is a side effect from chemo, EMG without any evidence of lumbosacral radiculopathy.  She also seems to have some ongoing joint pains involving her ankles, knees, sacrum, and possibly her neck.  -We discussed that ongoing concern for chronic opioid use with her type of pain.  She had some dizziness with buprenorphine patch in the past, however not sure what dose.  She is also reached more of a survivorship status with her cancer, and with chronic pain with no evidence of ongoing cancer, palliative care would typically transition pain management either to primary care or a pain specialist.  Our plan is to try to taper her oxycodone use down.  If she is unable to do this, I think a retrial of Butrans patch at 5 mcg would be reasonable.  We also discussed she is free to explore alternative pain clinic  options.  -Next prescription of oxycodone will be for #105 tablets-goal is to decrease oxycodone use and try to have more days where she is using 3 tablets instead of 4.  -Continue Cymbalta at 60 mg daily as she does find this effective.  -Recommended retrying compression stockings, particularly ones that go up over the knee.  -Recommended scheduling follow-up with PM&R as she was due for this in September.  -I did place a rheumatology referral today, given the concern that some arthritis could be playing a role in her symptoms.  She does have some joint deformities in her hands as well as swelling in several joints.  I am not sure if this warrants for arthritis work-up, however we would be interested to know if there is any role that steroid injections or other modalities could play in helping to manage her symptoms.          Follow up: 3 to 4 months    Video-Visit Details  Video Start Time:  10:39 AM  Video End Time:  11:09 AM    Originating Location (pt. Location): Home     Distant Location (provider location):  Offsite- Personal Home      Platform used for Video Visit: Miguel A     Total time spent on day of encounter is 46 mins, including reviewing record, review of above studies, above visit with patient, symptomatic discussion of primarily different types of pain, including medication adjustments/prescription management, and documentation.     Karla Millard, DO  Palliative Medicine   Pager [phone], AMCOM ID [id number]    Some chart documentation performed using Dragon Voice recognition Software. Although reviewed after completion, some words and grammatical errors may remain.

## 2023-02-01 ENCOUNTER — TELEPHONE (OUTPATIENT)
Dept: UROLOGY | Facility: CLINIC | Age: 58
End: 2023-02-01

## 2023-02-01 ENCOUNTER — VIRTUAL VISIT (OUTPATIENT)
Dept: ONCOLOGY | Facility: CLINIC | Age: 58
End: 2023-02-01
Attending: STUDENT IN AN ORGANIZED HEALTH CARE EDUCATION/TRAINING PROGRAM
Payer: MEDICARE

## 2023-02-01 DIAGNOSIS — M25.40 JOINT SWELLING: ICD-10-CM

## 2023-02-01 DIAGNOSIS — T45.1X5A PERIPHERAL NEUROPATHY DUE TO CHEMOTHERAPY (H): ICD-10-CM

## 2023-02-01 DIAGNOSIS — C16.3 MALIGNANT NEOPLASM OF PYLORIC ANTRUM (H): Primary | ICD-10-CM

## 2023-02-01 DIAGNOSIS — Z51.5 ENCOUNTER FOR PALLIATIVE CARE: ICD-10-CM

## 2023-02-01 DIAGNOSIS — G62.0 PERIPHERAL NEUROPATHY DUE TO CHEMOTHERAPY (H): ICD-10-CM

## 2023-02-01 DIAGNOSIS — M62.838 MUSCLE SPASM: ICD-10-CM

## 2023-02-01 DIAGNOSIS — M53.3 SACROILIAC JOINT PAIN: ICD-10-CM

## 2023-02-01 DIAGNOSIS — M25.50 MULTIPLE JOINT PAIN: ICD-10-CM

## 2023-02-01 PROCEDURE — 99215 OFFICE O/P EST HI 40 MIN: CPT | Mod: 95 | Performed by: STUDENT IN AN ORGANIZED HEALTH CARE EDUCATION/TRAINING PROGRAM

## 2023-02-01 NOTE — LETTER
2/1/2023         RE: Nathalie Bashir  [address] [address]        Dear Colleague,    Thank you for referring your patient, Nathalie Bashir, to the [facility] [place]. Please see a copy of my visit note below.    Nathalie is a 57 year old who is being evaluated via a billable video visit.  [place] in [place]    How would you like to obtain your AVS? MyChart  If the video visit is dropped, the invitation should be resent by: Text to cell phone: [phone]  Will anyone else be joining your video visit? No     Nancy Lewis, CISCO          Palliative Care Progress Note    Patient Name: Nathalie Bashir  Primary Provider: Alban Yuan    Chief Complaint/Patient ID: 56 yo F with hx of gastric adenocarcinoma              - Found during Whipple 1/2020 for presumed IPMN - no pancreatic malignancy found, but had poorly differentiated adenoca.  Received neoadjuvant chemo (5FU, leucovorin, oxaliplatin, docetaxel) with curative-intent subtotal gastrectomy, David-en-Y gastrojejunostomy 10/2020.  Required stenting of the jejunum for stenosis causing pain, removed 2/8/21.      Chronic and chemo-induced neuropathy - intol of gabapentin, pregabalin. Duloxetine somewhat effective, on TID/QID oxycodone      Last Palliative care appointment: 11/2/22 with Dr. Gill.      Reviewed: Yes    Interim History:  Nathalie Bashir is a 57 year old female who is seen today for follow up with Palliative Care via billable video visit.      Had EMG from Neurology 1/11/23. Positive for moderately severe sensorimotor axonal polyneuropathy. No evidence of left lumbosacral radiculopathy.    Doing a little bit better now.  Was a very busy fall with her hospitalization and her 's gastric surgery.    Continues to have significant neuropathy symptoms.  Primarily has pain in her calves down to her feet that worsens with long periods of standing.  She needs to take frequent rests.  Tried compression  socks and was not sure they were helpful.  States she only had 1 set 1 up to her ankles.  She does occasionally get pain in her hands, but notes more numbness symptoms that affect her ability to hold things.  Has been taking oxycodone 5 mg essentially 4 times every day.    She does think she saw a rheumatology several years ago but cannot remember the details of this.  He did have a history of chronic neck pain and had seen the pain clinic as well.  He was previously on a buprenorphine patch-does not remember the dose.  It seems like this was maybe around 2019.  Reports feeling dizzy on it and was not sure it helped the neck pain she had at that time.    Has not followed up with PM&R.     Social History:  Lives with her . Has a son and 3 grandchildren  Retired, worked in special education.  Social History     Tobacco Use     Smoking status: Former     Packs/day: 0.50     Years: 25.00     Pack years: 12.50     Types: Cigarettes     Quit date: 1/1/2020     Years since quitting: 3.0     Smokeless tobacco: Never   Substance Use Topics     Alcohol use: No     Drug use: No       Family History- Reviewed in Epic.    Allergies   Allergen Reactions     Gluten Meal Palpitations     Augmentin Rash     Patient tolerated Zosyn in 7/2020     Oxycontin [Oxycodone] Other (See Comments)     Confusion, loopy, oxycodone is tolerated.    Pt states she tolerates regular oxycodone, cannot take 12 hour oxycontin.       Pregabalin      interfered with Cymbalta     Topiramate      Tongue numbness, taste alteration, irritable      Acetaminophen Nausea     Urine retention       Dust Mite Extract      Gabapentin Dizziness and GI Disturbance     Ketorolac Headache     Latex Rash     Methadone Fatigue     Mold      Naprosyn [Naproxen] Dizziness and GI Disturbance     Seasonal Allergies        Medications- Reviewed in Epic.    Past Medical History- Reviewed in [facility].    Past Surgical History- Reviewed in Epic.    Review of Systems:   ROS:  10 point ROS neg other than the symptoms noted above in the HPI.      Physical Exam:   Constitutional: Alert, pleasant, no apparent distress. Sitting up in chair.  Eyes: Sclera non-icteric, no eye discharge.  ENT: No nasal discharge. Ears grossly normal.  Respiratory: Unlabored respirations. Speaking in full sentences.  Musculoskeletal: Extremities appear normal- no gross deformities noted. No edema noted on upper body.   Skin: No suspicious lesions or rashes on visible skin.  Neurologic: Clear speech, no aphasia. No facial droop.  Psychiatric: Mentation appears normal, appropriate attention. Affect normal/bright. Does not appear anxious or depressed.      Key Data Reviewed:  LABS: 11/28/22- Cr 1.04, GFR 63, Albumin 3.9, LFTs wnl. Hgb 12.3, Plts 223.     IMAGING: CT CAP 11/28/22- IMPRESSION: No evidence of recurrent or metastatic disease in the chest, abdomen, or pelvis.    Impression & Recommendations & Counseling:  Nathalie Bashir is a 57 year old female with history of gastric adenocarcinoma found during Whipple 1/2020 for presumed IPMN, no pancreatic malignancy found at that time, now s/p neoadjuvant chemotherapy and curative intent surgery 10/2020.  Is now on surveillance and feeling more back to normal.  Seen for symptom management of peripheral neuropathy that worsened in the setting of chemo.      Pain - Likely multifactorial.  Neuropathic pain is a side effect from chemo, EMG without any evidence of lumbosacral radiculopathy.  She also seems to have some ongoing joint pains involving her ankles, knees, sacrum, and possibly her neck.  -We discussed that ongoing concern for chronic opioid use with her type of pain.  She had some dizziness with buprenorphine patch in the past, however not sure what dose.  She is also reached more of a survivorship status with her cancer, and with chronic pain with no evidence of ongoing cancer, palliative care would typically transition pain management either to primary care  or a pain specialist.  Our plan is to try to taper her oxycodone use down.  If she is unable to do this, I think a retrial of Butrans patch at 5 mcg would be reasonable.  We also discussed she is free to explore alternative pain clinic options.  -Next prescription of oxycodone will be for #105 tablets-goal is to decrease oxycodone use and try to have more days where she is using 3 tablets instead of 4.  -Continue Cymbalta at 60 mg daily as she does find this effective.  -Recommended retrying compression stockings, particularly ones that go up over the knee.  -Recommended scheduling follow-up with PM&R as she was due for this in September.  -I did place a rheumatology referral today, given the concern that some arthritis could be playing a role in her symptoms.  She does have some joint deformities in her hands as well as swelling in several joints.  I am not sure if this warrants for arthritis work-up, however we would be interested to know if there is any role that steroid injections or other modalities could play in helping to manage her symptoms.          Follow up: 3 to 4 months    Video-Visit Details  Video Start Time:  10:39 AM  Video End Time:  11:09 AM    Originating Location (pt. Location): Home     Distant Location (provider location):  Offsite- Personal Home      Platform used for Video Visit: Miguel A     Total time spent on day of encounter is 46 mins, including reviewing record, review of above studies, above visit with patient, symptomatic discussion of primarily different types of pain, including medication adjustments/prescription management, and documentation.     Karla Millard,   Palliative Medicine   Pager [phone], AMCOM ID [id number]    Some chart documentation performed using Dragon Voice recognition Software. Although reviewed after completion, some words and grammatical errors may remain.        Again, thank you for allowing me to participate in the care of your patient.         Sincerely,        Karla Millard, DO

## 2023-02-01 NOTE — LETTER
2/1/2023         RE: Nathalie Bashir  [address] [address]        Dear Colleague,    Thank you for referring your patient, Nathalie Bashir, to the [facility] [place]. Please see a copy of my visit note below.    Nathalie is a 57 year old who is being evaluated via a billable video visit.  [place] in [place]    How would you like to obtain your AVS? MyChart  If the video visit is dropped, the invitation should be resent by: Text to cell phone: [phone]  Will anyone else be joining your video visit? No     Nancy Lewis, CISCO          Palliative Care Progress Note    Patient Name: Nathalie Bashir  Primary Provider: Alban Yuan    Chief Complaint/Patient ID: 58 yo F with hx of gastric adenocarcinoma              - Found during Whipple 1/2020 for presumed IPMN - no pancreatic malignancy found, but had poorly differentiated adenoca.  Received neoadjuvant chemo (5FU, leucovorin, oxaliplatin, docetaxel) with curative-intent subtotal gastrectomy, David-en-Y gastrojejunostomy 10/2020.  Required stenting of the jejunum for stenosis causing pain, removed 2/8/21.      Chronic and chemo-induced neuropathy - intol of gabapentin, pregabalin. Duloxetine somewhat effective, on TID/QID oxycodone      Last Palliative care appointment: 11/2/22 with Dr. Gill.      Reviewed: Yes    Interim History:  Nathalie Bashir is a 57 year old female who is seen today for follow up with Palliative Care via billable video visit.      Had EMG from Neurology 1/11/23. Positive for moderately severe sensorimotor axonal polyneuropathy. No evidence of left lumbosacral radiculopathy.    Doing a little bit better now.  Was a very busy fall with her hospitalization and her 's gastric surgery.    Continues to have significant neuropathy symptoms.  Primarily has pain in her calves down to her feet that worsens with long periods of standing.  She needs to take frequent rests.  Tried compression  socks and was not sure they were helpful.  States she only had 1 set 1 up to her ankles.  She does occasionally get pain in her hands, but notes more numbness symptoms that affect her ability to hold things.  Has been taking oxycodone 5 mg essentially 4 times every day.    She does think she saw a rheumatology several years ago but cannot remember the details of this.  He did have a history of chronic neck pain and had seen the pain clinic as well.  He was previously on a buprenorphine patch-does not remember the dose.  It seems like this was maybe around 2019.  Reports feeling dizzy on it and was not sure it helped the neck pain she had at that time.    Has not followed up with PM&R.     Social History:  Lives with her . Has a son and 3 grandchildren  Retired, worked in special education.  Social History     Tobacco Use     Smoking status: Former     Packs/day: 0.50     Years: 25.00     Pack years: 12.50     Types: Cigarettes     Quit date: 1/1/2020     Years since quitting: 3.0     Smokeless tobacco: Never   Substance Use Topics     Alcohol use: No     Drug use: No       Family History- Reviewed in Epic.    Allergies   Allergen Reactions     Gluten Meal Palpitations     Augmentin Rash     Patient tolerated Zosyn in 7/2020     Oxycontin [Oxycodone] Other (See Comments)     Confusion, loopy, oxycodone is tolerated.    Pt states she tolerates regular oxycodone, cannot take 12 hour oxycontin.       Pregabalin      interfered with Cymbalta     Topiramate      Tongue numbness, taste alteration, irritable      Acetaminophen Nausea     Urine retention       Dust Mite Extract      Gabapentin Dizziness and GI Disturbance     Ketorolac Headache     Latex Rash     Methadone Fatigue     Mold      Naprosyn [Naproxen] Dizziness and GI Disturbance     Seasonal Allergies        Medications- Reviewed in Epic.    Past Medical History- Reviewed in [facility].    Past Surgical History- Reviewed in Epic.    Review of Systems:   ROS:  10 point ROS neg other than the symptoms noted above in the HPI.      Physical Exam:   Constitutional: Alert, pleasant, no apparent distress. Sitting up in chair.  Eyes: Sclera non-icteric, no eye discharge.  ENT: No nasal discharge. Ears grossly normal.  Respiratory: Unlabored respirations. Speaking in full sentences.  Musculoskeletal: Extremities appear normal- no gross deformities noted. No edema noted on upper body.   Skin: No suspicious lesions or rashes on visible skin.  Neurologic: Clear speech, no aphasia. No facial droop.  Psychiatric: Mentation appears normal, appropriate attention. Affect normal/bright. Does not appear anxious or depressed.      Key Data Reviewed:  LABS: 11/28/22- Cr 1.04, GFR 63, Albumin 3.9, LFTs wnl. Hgb 12.3, Plts 223.     IMAGING: CT CAP 11/28/22- IMPRESSION: No evidence of recurrent or metastatic disease in the chest, abdomen, or pelvis.    Impression & Recommendations & Counseling:  Nathalie Bashir is a 57 year old female with history of gastric adenocarcinoma found during Whipple 1/2020 for presumed IPMN, no pancreatic malignancy found at that time, now s/p neoadjuvant chemotherapy and curative intent surgery 10/2020.  Is now on surveillance and feeling more back to normal.  Seen for symptom management of peripheral neuropathy that worsened in the setting of chemo.      Pain - Likely multifactorial.  Neuropathic pain is a side effect from chemo, EMG without any evidence of lumbosacral radiculopathy.  She also seems to have some ongoing joint pains involving her ankles, knees, sacrum, and possibly her neck.  -We discussed that ongoing concern for chronic opioid use with her type of pain.  She had some dizziness with buprenorphine patch in the past, however not sure what dose.  She is also reached more of a survivorship status with her cancer, and with chronic pain with no evidence of ongoing cancer, palliative care would typically transition pain management either to primary care  or a pain specialist.  Our plan is to try to taper her oxycodone use down.  If she is unable to do this, I think a retrial of Butrans patch at 5 mcg would be reasonable.  We also discussed she is free to explore alternative pain clinic options.  -Next prescription of oxycodone will be for #105 tablets-goal is to decrease oxycodone use and try to have more days where she is using 3 tablets instead of 4.  -Continue Cymbalta at 60 mg daily as she does find this effective.  -Recommended retrying compression stockings, particularly ones that go up over the knee.  -Recommended scheduling follow-up with PM&R as she was due for this in September.  -I did place a rheumatology referral today, given the concern that some arthritis could be playing a role in her symptoms.  She does have some joint deformities in her hands as well as swelling in several joints.  I am not sure if this warrants for arthritis work-up, however we would be interested to know if there is any role that steroid injections or other modalities could play in helping to manage her symptoms.          Follow up: 3 to 4 months    Video-Visit Details  Video Start Time:  10:39 AM  Video End Time:  11:09 AM    Originating Location (pt. Location): Home     Distant Location (provider location):  Offsite- Personal Home      Platform used for Video Visit: Miguel A     Total time spent on day of encounter is 46 mins, including reviewing record, review of above studies, above visit with patient, symptomatic discussion of primarily different types of pain, including medication adjustments/prescription management, and documentation.     Karla Millard,   Palliative Medicine   Pager [phone], AMCOM ID [id number]    Some chart documentation performed using Dragon Voice recognition Software. Although reviewed after completion, some words and grammatical errors may remain.        Again, thank you for allowing me to participate in the care of your patient.         Sincerely,        Karla Millard, DO

## 2023-02-01 NOTE — PATIENT INSTRUCTIONS
Recommendations:  -Our goal is to decrease oxycodone use.  My hope would be that he would be able to go down to 3 tablets daily.  I will plan to reduce the number of tablets on your next refill.  -Retry compression stockings, particularly ones that go over the knee.  -Recommend following up with Dr. Dobbs from PM&R.  -I did place a rheumatology referral for assessment of your joint symptoms and conversation regarding any possible other treatments such as injections that might be helpful.  -We will plan to start looking into other pain clinic options as well.    Follow up: 3 to 4 months      Reasons to Call    If you are having worsening/uncontrolled symptoms we want you to call!    You or your other physicians make any changes to medications we have prescribed.  -Please call for refills 4-5 days before you will run out of medication.    Important Phone Numbers, including: Refills, scheduling, and general questions     [facility] - Astrid Santiago. Palliative Care RN - [phone]    [facility] [place], [place] - Martine Russell. Palliative Care RN - [phone]  *After hours or on weekends. Will connect you with on call MD. [phone]

## 2023-02-15 DIAGNOSIS — M79.2 NEUROPATHIC PAIN: ICD-10-CM

## 2023-02-15 DIAGNOSIS — G89.3 CHRONIC PAIN DUE TO NEOPLASM: ICD-10-CM

## 2023-02-15 RX ORDER — DULOXETIN HYDROCHLORIDE 30 MG/1
60 CAPSULE, DELAYED RELEASE ORAL EVERY MORNING
Qty: 180 CAPSULE | Refills: 3 | Status: SHIPPED | OUTPATIENT
Start: 2023-02-15 | End: 2023-03-17 | Stop reason: DRUGHIGH

## 2023-02-15 NOTE — TELEPHONE ENCOUNTER
"Duloxetine 30mg capsule  Last prescribing provider: Bhavna Gill on 11/2/22    Last clinic visit date: 2/1/23 w/ Dr. Millard    Recommendations for requested medication (if none, N/A): 2/1/23, \"Duloxetine somewhat effective, on TID/QID oxycodone\"    Any other pertinent information (if none, N/A): NA    Refilled: Y/N, if NO, why?    Routed to Dr. Millard  "

## 2023-03-03 ENCOUNTER — LAB (OUTPATIENT)
Dept: LAB | Facility: CLINIC | Age: 58
End: 2023-03-03
Attending: INTERNAL MEDICINE
Payer: MEDICARE

## 2023-03-03 PROCEDURE — 250N000011 HC RX IP 250 OP 636: Performed by: INTERNAL MEDICINE

## 2023-03-03 RX ORDER — HEPARIN SODIUM (PORCINE) LOCK FLUSH IV SOLN 100 UNIT/ML 100 UNIT/ML
5 SOLUTION INTRAVENOUS ONCE
Status: COMPLETED | OUTPATIENT
Start: 2023-03-03 | End: 2023-03-03

## 2023-03-03 RX ADMIN — Medication 5 ML: at 11:01

## 2023-03-03 NOTE — NURSING NOTE
Chief Complaint   Patient presents with     Port Flush     Port flushed by RN.     Melina Samuel RN

## 2023-03-07 DIAGNOSIS — M79.2 NEUROPATHIC PAIN: ICD-10-CM

## 2023-03-07 DIAGNOSIS — G89.3 CHRONIC PAIN DUE TO NEOPLASM: ICD-10-CM

## 2023-03-07 RX ORDER — OXYCODONE HYDROCHLORIDE 5 MG/1
5 TABLET ORAL 3 TIMES DAILY PRN
Qty: 90 TABLET | Refills: 0 | Status: SHIPPED | OUTPATIENT
Start: 2023-03-09 | End: 2023-04-03

## 2023-03-11 DIAGNOSIS — C16.3 MALIGNANT NEOPLASM OF PYLORIC ANTRUM (H): ICD-10-CM

## 2023-03-11 DIAGNOSIS — R10.13 ABDOMINAL PAIN, EPIGASTRIC: ICD-10-CM

## 2023-03-11 DIAGNOSIS — D49.0 IPMN (INTRADUCTAL PAPILLARY MUCINOUS NEOPLASM): ICD-10-CM

## 2023-03-13 RX ORDER — PANTOPRAZOLE SODIUM 40 MG/1
TABLET, DELAYED RELEASE ORAL
Qty: 180 TABLET | Refills: 1 | Status: SHIPPED | OUTPATIENT
Start: 2023-03-13 | End: 2023-07-17

## 2023-03-17 ENCOUNTER — TELEPHONE (OUTPATIENT)
Dept: UROLOGY | Facility: CLINIC | Age: 58
End: 2023-03-17
Payer: MEDICARE

## 2023-03-17 ENCOUNTER — LAB (OUTPATIENT)
Dept: LAB | Facility: HOSPITAL | Age: 58
End: 2023-03-17
Payer: MEDICARE

## 2023-03-17 DIAGNOSIS — G62.0 PERIPHERAL NEUROPATHY DUE TO CHEMOTHERAPY (H): Primary | ICD-10-CM

## 2023-03-17 DIAGNOSIS — R39.89 SUSPECTED UTI: ICD-10-CM

## 2023-03-17 DIAGNOSIS — T45.1X5A PERIPHERAL NEUROPATHY DUE TO CHEMOTHERAPY (H): Primary | ICD-10-CM

## 2023-03-17 DIAGNOSIS — R30.0 DYSURIA: ICD-10-CM

## 2023-03-17 DIAGNOSIS — R30.0 DYSURIA: Primary | ICD-10-CM

## 2023-03-17 LAB
ALBUMIN UR-MCNC: 50 MG/DL
APPEARANCE UR: ABNORMAL
BILIRUB UR QL STRIP: NEGATIVE
COLOR UR AUTO: YELLOW
GLUCOSE UR STRIP-MCNC: NEGATIVE MG/DL
HGB UR QL STRIP: ABNORMAL
KETONES UR STRIP-MCNC: ABNORMAL MG/DL
LEUKOCYTE ESTERASE UR QL STRIP: ABNORMAL
NITRATE UR QL: NEGATIVE
PH UR STRIP: 6.5 [PH] (ref 5–7)
SP GR UR STRIP: 1.02 (ref 1–1.03)
UROBILINOGEN UR STRIP-MCNC: 4 MG/DL

## 2023-03-17 PROCEDURE — 87086 URINE CULTURE/COLONY COUNT: CPT

## 2023-03-17 PROCEDURE — 81003 URINALYSIS AUTO W/O SCOPE: CPT | Mod: QW

## 2023-03-17 RX ORDER — DULOXETIN HYDROCHLORIDE 60 MG/1
60 CAPSULE, DELAYED RELEASE ORAL DAILY
Qty: 30 CAPSULE | Refills: 3 | Status: SHIPPED | OUTPATIENT
Start: 2023-03-17 | End: 2023-05-17

## 2023-03-17 NOTE — TELEPHONE ENCOUNTER
[facility] Call Center    Phone Message    May a detailed message be left on voicemail: yes     Reason for Call: Symptoms or Concerns     If patient has red-flag symptoms, warm transfer to triage line    Current symptom or concern: The patient called stating she has had urine odor for a few days. She also has bladder and urethra cramping/pain. She is asking for urine testing. Please review and advise. Thank you.    Symptoms have been present for:  2 day(s)    Has patient previously been seen for this? Yes    By: Agustin    Date: 1/24/23    Are there any new or worsening symptoms? No      Action Taken: Message routed to:  Other: Urology    Travel Screening: Not Applicable

## 2023-03-17 NOTE — TELEPHONE ENCOUNTER
Received fax from pharmacy requesting a new order for duloxetine 60 mg capsules as they are more cost effective for the patient.     Last office visit: 2/1/23  Scheduled for follow up 5/17/23     Will route request to MD for review.

## 2023-03-19 LAB
BACTERIA UR CULT: ABNORMAL
BACTERIA UR CULT: ABNORMAL

## 2023-03-21 RX ORDER — SULFAMETHOXAZOLE/TRIMETHOPRIM 800-160 MG
1 TABLET ORAL 2 TIMES DAILY
Qty: 10 TABLET | Refills: 0 | Status: SHIPPED | OUTPATIENT
Start: 2023-03-21 | End: 2023-03-26

## 2023-03-30 ENCOUNTER — ONCOLOGY VISIT (OUTPATIENT)
Dept: ONCOLOGY | Facility: CLINIC | Age: 58
End: 2023-03-30
Attending: STUDENT IN AN ORGANIZED HEALTH CARE EDUCATION/TRAINING PROGRAM
Payer: MEDICARE

## 2023-03-30 VITALS
DIASTOLIC BLOOD PRESSURE: 66 MMHG | HEART RATE: 88 BPM | TEMPERATURE: 98.7 F | OXYGEN SATURATION: 99 % | WEIGHT: 175 LBS | SYSTOLIC BLOOD PRESSURE: 93 MMHG | BODY MASS INDEX: 25.84 KG/M2 | RESPIRATION RATE: 16 BRPM

## 2023-03-30 DIAGNOSIS — D49.0 IPMN (INTRADUCTAL PAPILLARY MUCINOUS NEOPLASM): ICD-10-CM

## 2023-03-30 DIAGNOSIS — C16.3 MALIGNANT NEOPLASM OF PYLORIC ANTRUM (H): Primary | ICD-10-CM

## 2023-03-30 DIAGNOSIS — G62.0 PERIPHERAL NEUROPATHY DUE TO CHEMOTHERAPY (H): ICD-10-CM

## 2023-03-30 DIAGNOSIS — T45.1X5A PERIPHERAL NEUROPATHY DUE TO CHEMOTHERAPY (H): ICD-10-CM

## 2023-03-30 PROCEDURE — G0463 HOSPITAL OUTPT CLINIC VISIT: HCPCS | Performed by: STUDENT IN AN ORGANIZED HEALTH CARE EDUCATION/TRAINING PROGRAM

## 2023-03-30 PROCEDURE — 99215 OFFICE O/P EST HI 40 MIN: CPT | Performed by: STUDENT IN AN ORGANIZED HEALTH CARE EDUCATION/TRAINING PROGRAM

## 2023-03-30 ASSESSMENT — PAIN SCALES - GENERAL: PAINLEVEL: SEVERE PAIN (7)

## 2023-03-30 NOTE — NURSING NOTE
"Oncology Rooming Note    March 30, 2023 4:03 PM   Nathalie Bashir is a 57 year old female who presents for:    Chief Complaint   Patient presents with     Oncology Clinic Visit     Gastric cancer     Initial Vitals: BP 93/66   Pulse 88   Temp 98.7  F (37.1  C) (Oral)   Resp 16   Wt 79.4 kg (175 lb)   LMP 09/23/2014   SpO2 99%   BMI 25.84 kg/m   Estimated body mass index is 25.84 kg/m  as calculated from the following:    Height as of 1/24/23: 1.753 m (5' 9\").    Weight as of this encounter: 79.4 kg (175 lb). Body surface area is 1.97 meters squared.  Severe Pain (7) Comment: Data Unavailable   Patient's last menstrual period was 09/23/2014.  Allergies reviewed: Yes  Medications reviewed: Yes    Medications: Medication refills not needed today.  Pharmacy name entered into EPIC:    [facility] - [place], [place] - [address] [place] [address] [phone]  [facility] [id number] [place] [facility] [address] [place] [id number] [place], [place] - [address] [place] [address] [facility] [place], [place] - [address]     Clinical concerns: none       Joann Acosta CMA            "

## 2023-03-30 NOTE — PATIENT INSTRUCTIONS
Continue cymbalta at your current dosing for neuropathy. Continue your pain medication regimen as instructed by Dr. Millard.  Bilateral hand xrays were ordered to further evaluate arthritic changes and changes we see on exam. We will follow up after the xrays are done.   Follow up with Rheumatology as planned.  Follow up with Dr. Dobbs in 3 months.

## 2023-03-30 NOTE — LETTER
3/30/2023         RE: Nathalie Bashir  [address] [facility] [address]        Dear Colleague,    Thank you for referring your patient, Nathalie Bashir, to the [facility]. Please see a copy of my visit note below.    [facility]   PM&R clinic note        Interval history:     Nathalie Bashir presents to clinic today for follow up reg her rehab needs.   She has h/o gastric adenocarcinoma (diagnosed during whipple in 1/2020 for presumed IPMN, no pancreatic malignancy found).  Was last seen in clinic on 6/22/22.  Recommendations included   1. Work-up:  1. LLE EMG ordered to further evaluate possible left lumbar radiculopathy  2. Therapy/equipment/braces:  1. Plan to restart outpatient PT and OT in the fall. Instructed to stay active and continue home exercise program as tolerated.  3. Medications:  1. Continue duloxetine and oxycodone per Palliative recs. Will contact Dr. Óscar Cook regarding patient's request to adjust duloxetine dose.  4. Follow up: 3 month virtual visit.    Oncology History:  -Followed for a pancreatic duct dilatation and pancreatic cyst which was noted in November 2018.    -Since May 2019 she started noticing some nausea and vomiting and occasional abdominal discomfort.    -She had an ultrasound in August 2019 which was essentially unremarkable.    -She had a repeat endoscopic ultrasound on 10/29/2019 which showed increase in size of the cyst as well as dilation of the main pancreatic duct.  FNA and fluid analysis showed mucinous epithelium.    -She was referred to Dr. 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  Surprisingly there was no pancreatic ductal neoplasm seen but on the resected specimen stomach cancer was noted.  It was poorly differentiated adenocarcinoma with poorly cohesive/signet ring cell type with proximal gastric mucosal and serosal margins being positive.  There  was lymphovascular and perineural invasion seen.  22 lymph nodes were sampled and all were benign.  It was a pT4aN0 lesion.  HER-2/christine FISH was not amplified.       -EUS on 3/3/2020 without clear evidence of neoplasm endoscopically. Left gastric lymph node fine-needle aspiration was negative for carcinoma. Biopsy from the gastric jejunal anastomosis as well as lesser curvature of the stomach also did not show any malignancy.  -She had a PET/CT on 3/13/2020 which showed soft tissue streaky density with increased FDG uptake adjacent to the pancreaticojejunostomy which could be neoplastic in origin.  There is mild increased FDG uptake in the gastric remnant.  Focal area of soft tissue thickening with fatty streakiness along medial laparotomy with increased FDG uptake which likely is inflammatory. Increased FDG uptake in thoracic esophagus which could be esophagitis.   -She started on chemotherapy with 5FU, oxaliplatin, and Taxotere on 3/27/20. She was seen on 4/7/20 for evaluation of fevers. She was treated for possible pneumonia with Levaquin.   -She was seen in clinic 4/13/20 for RUQ pain,CT and labs reassuring. She received cycles 2 and 3 of chemotherapy on 4/20/20 and 5/4/20.   -She was admitted from 5/21-5/23/20 with neutropenic aspiration pneumonia. She received cycle 4 on 5/27/20 with Neulasta support.   -PET/CT on 6/3/20 showed mild residual uptake at the site of gastrojejunal anastomosis which likely is physiologic.  There is almost resolution of soft tissue nodule next to the pancreaticoduodenal anastomosis without FDG uptake.  There is focal increased uptake in the left posterior acetabulum which could be artifact.   -Decreased dose of oxaliplatin due to neuropathy/cold sensitivity on 6/11. Decreased 5FU to 85% with cycle #6 due to mucositis/diarrhea/taste changes. Treatment was held 7/9/20 due to diarrhea, dehydration, tachycardia, rash, and weight loss and she got cycle 7 on 7/15. On 7/20 she presented to  the ED with RUQ abdominal pain, and was admitted 7/20-7/29 for acute cholangitis    -She received C#8 FLOT on 8/14/2020.   -PET/CT on 9/2/2020 did not show evidence of disease.  Small peritoneal nodule was a stable.  This was most consistent with fat necrosis.   -CT chest abdomen and pelvis on 9/23/2020 also does not show evidence of malignancy recurrence and the peritoneal nodule in the right upper quadrant is stable.  -Before definite surgery she had diagnostic laparoscopy which was negative for evidence of malignancy so on 10/21/2020 she had exploratory laparotomy with extensive lysis of additions, partial omentectomy, resection of Gastrojejunal Anastomosis w/ En Bloc Subtotal Gastrectomy and modified D2 Lymphadenectomy with David-en-Y Gastrojejunostomy Reconstruction.  Final pathology showed Focal residual poorly differentiated adenocarcinoma (0.4cm) with signet-ring morphology status    post-neoadjuvant chemotherapy and all 9 lymph nodes were benign.  All margins were negative.  It was a pT1bN0 lesion.   -Before definite surgery she had diagnostic laparoscopy which was negative for evidence of malignancy so on 10/21/2020 she had exploratory laparotomy with extensive lysis of additions, partial omentectomy, resection of Gastrojejunal Anastomosis w/ En Bloc Subtotal Gastrectomy and modified D2 Lymphadenectomy with David-en-Y Gastrojejunostomy Reconstruction.  Final pathology showed Focal residual poorly differentiated adenocarcinoma (0.4cm) with signet-ring morphology status    post-neoadjuvant chemotherapy and all 9 lymph nodes were benign.  All margins were negative.  It was a [id number] lesion.   -She was admitted from 11/4/2020-11/6/2020 for nausea vomiting and left upper quadrant pain and CT scan showed fluid collection in the left upper quadrant, likely hematoma.  Upper GI was negative for leak.  No drainage was recommended by IR.  He was discharged on 7 days of levofloxacin and Flagyl.  -11/20/2020.  CT abdomen  and pelvis showed postsurgical changes of Whipple and subtotal gastrectomy with David-en-Y reconstruction.  There is resolution of previously demonstrated fluid collection between the stomach and the spleen and decrease in sigmoid thickening.  Small left pleural effusion with atelectasis adjacent to it.   -12/7/2020.  EGD showed healthy-appearing and generous pouch (remaining the stomach) with widely patent end to side gastrojejunostomy, though with the David/enteral limb mildly stenostic and tortuous at its origin. Therefore a covered metal stent was placed from stomach to jejunum across the stenotic region, resulting in straightening of the course.   -2/8/2021.  EGD was done and stent was removed.  -5/21/2021.  CT chest abdomen and pelvis does not show evidence of recurrence of malignancy.  -5/19/22- Last saw Dr. Pollard for follow up. Continues with duloxetine and QID oxycodone for neuropathy. Plan for repeat CT CAT in 6 months, repeat EGD on as needed basis. No evidence of recurrence  -6/8/22- EMG done with Dr. Avalos. 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  -1/11/2023- EMG with Dr. Avalos. 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 No evidence of left lumbosacral radiculopathy.  2/1/23- Last visit with Dr. Millard. Continues with neuropathic pain and joint pains. Plan to taper oxycodone, possible retrial of Butrans patch if she si unable to wean oxycodone. May benefit from pain clinic. Continue cymbalta at 60 mg daily. Recommended retrying compression stockings. Hand deformities, may need arthritis work up.    Symptoms,  Patient was seen for a return visit today.  She has leg swelling in both feet, swelling is at ankle level and in both feet. Does not progress up past ankle level.  Neuropathy pain is still in hands, feet and calves. It has not changed and she feels like it is exacerbated when she is on her feet.   She also has arthritis  like changes/deformities in her hands. Her neuropathic symptoms also progress past her hands up towards her elbow.   She states that the taper of oxycodone has been rough, especially with the weather change/cold. She feels it in her wrists and hands the most.  She is still on cymbalta and feels like it helps both with her depression and neuropathic symptoms.    Therapies/HEP,  Not currently doing therapy.      Functionally,   No changes.      Social history is unchanged.      Medications:  Current Outpatient Medications   Medication Sig Dispense Refill     amitriptyline (ELAVIL) 75 MG tablet Take 1 tablet (75 mg) by mouth At Bedtime 30 tablet 3     COMPOUNDED NON-CONTROLLED SUBSTANCE (CMPD RX) - PHARMACY TO MIX COMPOUNDED MEDICATION Estriol 1 mg/gram,place 1 gram vaginally three times per week,42.5 grams 1 each 3     conjugated estrogens (PREMARIN) 0.625 MG/GM vaginal cream Place 1 g vaginally three times a week 30 g 3     Cyanocobalamin 1000 MCG CAPS Take 1 tablet by mouth every morning (Patient not taking: Reported on 11/2/2022)       cyclobenzaprine (FLEXERIL) 10 MG tablet TAKE 1 TABLET BY MOUTH 3 TIMES DAILY AS NEEDED FOR MUSCLE SPASMS 90 tablet 3     DULoxetine (CYMBALTA) 60 MG capsule Take 1 capsule (60 mg) by mouth daily 30 capsule 3     famotidine (PEPCID) 20 MG tablet TAKE 1 TABLET (20 MG) BY MOUTH 2 TIMES DAILY AS NEEDED 180 tablet 1     fluticasone (FLONASE) 50 MCG/ACT nasal spray Spray 1 spray into both nostrils daily at 2 pm       hydrOXYzine (VISTARIL) 25 MG capsule Take 25-50 mg by mouth 4 times daily as needed for itching or anxiety       loperamide (IMODIUM) 2 MG capsule Take 4 mg by mouth 4 times daily as needed for diarrhea (Patient not taking: Reported on 11/2/2022)       loratadine-pseudoePHEDrine (CLARITIN-D 24-HOUR)  MG 24 hr tablet Take 1 tablet by mouth daily as needed for allergies Usually takes in Spring/Fall       ondansetron (ZOFRAN) 8 MG tablet Take 1 tablet (8 mg) by mouth every  8 hours as needed (Nausea/Vomiting) 90 tablet 2     oxyCODONE (ROXICODONE) 5 MG tablet Take 1 tablet (5 mg) by mouth 3 times daily as needed for moderate to severe pain Maximum 3 pills/day. 90 tablet 0     pantoprazole (PROTONIX) 40 MG EC tablet TAKE 1 TABLET BY MOUTH TWICE A  tablet 1     sennosides (SENOKOT) 8.6 MG tablet 1-2 tablets 1-2 times daily as needed. 120 tablet 1     simethicone 80 MG TABS Take 1 tablet by mouth every 6 hours as needed (bloating, gas, belching) 90 tablet 3     sulfamethoxazole-trimethoprim (BACTRIM DS) 800-160 MG tablet Take 1 tablet by mouth 2 times daily (Patient not taking: Reported on 2/1/2023) 10 tablet 0     SUMAtriptan (IMITREX) 100 MG tablet Take 100 mg by mouth at onset of headache for migraine                Physical Exam:   LMP 09/23/2014   Gen: NAD, pleasant and cooperative  HEENT: Normocephalic, atraumatic, extra-ocular movements appear intact  Pulm: non-labored breathing in room air  Ext: mild edema in BLE, no tenderness in calves, bilateral hand deformities, L>R, especially at DIP joints, swelling, TTP  Neuro/MSK:   Orientation: Oriented to person, place, time, situation. Exhibits good insight into her condition and ongoing management/symptoms.  Motor: 5/5 with bilateral shoulder abduction, elbow extension, wrist extension and  strength/finger intrinsics. 5/5 with bilateral hip flexion, 5/5 with bilateral knee extension, ankle dorsiflexion, EHL, plantar flexion.   Sensory: intact to light touch throughout all dermatomes in bilateral lower extremities.      Labs/Imaging:  Lab Results   Component Value Date    WBC 6.4 11/28/2022    HGB 12.3 11/28/2022    HCT 38.7 11/28/2022    MCV 92 11/28/2022     11/28/2022     Lab Results   Component Value Date     11/28/2022    POTASSIUM 4.8 11/28/2022    CHLORIDE 101 11/28/2022    CO2 29 11/28/2022    GLC 83 11/28/2022     Lab Results   Component Value Date    GFRESTIMATED 63 11/28/2022    GFRESTBLACK 81  05/21/2021     Lab Results   Component Value Date    AST 23 11/28/2022    ALT 16 11/28/2022    ALKPHOS 115 (H) 11/28/2022    BILITOTAL 0.3 11/28/2022     Lab Results   Component Value Date    INR 1.21 (H) 12/07/2020     Lab Results   Component Value Date    BUN 11.9 11/28/2022    CR 1.04 (H) 11/28/2022              Assessment/Plan   Nathalie Bashir presents to clinic today for follow up reg her rehab needs.   She has h/o gastric adenocarcinoma (diagnosed during whipple in 1/2020 for presumed IPMN, no pancreatic malignancy found). Was last seen in clinic on 6/22/22. Bilateral LE EMG results were reviewed with Nathalie today. She has a Rheumatology evaluation scheduled, and will plan for bilateral hand xrays to evaluate joint deformities/arthritic changes. Should try to wear her compression stockings daily to help with leg swelling. Continue pain medication regimen per Palliative team recommendations including Cymbalta for neuropathic pain. Will plan a 3 month return visit. Nathalie is in agreement with the above plan.      1. Work-up:  1. LE EMG results were reviewed with patient today. 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   2. Bilateral hand XRs were ordered to further evaluate notable joint deformities on exam/arthritic changes.  2. Therapy/equipment/braces:  1. Wear compression stockings for lower extremity swelling. Wear compression when up and active daily and can be taken off at night for rest.  3. Medications:  1. Continue weaning oxycodone per Palliative Care instructions, follow up with Palliative team in May. Continue cymbalta for depression and neuropathy symptoms.  4. Referral / follow up with other providers:  1. Follow up with Rheumatology as planned.  5. Follow up: 3 months.      Apurva Dobbs MD  Physical Medicine & Rehabilitation      50 minutes spent on the date of the encounter doing chart review, history and exam,  documentation and further activities as noted above.

## 2023-03-30 NOTE — PROGRESS NOTES
[facility]   PM&R clinic note        Interval history:     Nathalie Bashir presents to clinic today for follow up reg her rehab needs.   She has h/o gastric adenocarcinoma (diagnosed during whipple in 1/2020 for presumed IPMN, no pancreatic malignancy found).  Was last seen in clinic on 6/22/22.  Recommendations included   1. Work-up:  1. LLE EMG ordered to further evaluate possible left lumbar radiculopathy  2. Therapy/equipment/braces:  1. Plan to restart outpatient PT and OT in the fall. Instructed to stay active and continue home exercise program as tolerated.  3. Medications:  1. Continue duloxetine and oxycodone per Palliative recs. Will contact Dr. Óscar Cook regarding patient's request to adjust duloxetine dose.  4. Follow up: 3 month virtual visit.    Oncology History:  -Followed for a pancreatic duct dilatation and pancreatic cyst which was noted in November 2018.    -Since May 2019 she started noticing some nausea and vomiting and occasional abdominal discomfort.    -She had an ultrasound in August 2019 which was essentially unremarkable.    -She had a repeat endoscopic ultrasound on 10/29/2019 which showed increase in size of the cyst as well as dilation of the main pancreatic duct.  FNA and fluid analysis showed mucinous epithelium.    -She was referred to Dr. 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  Surprisingly there was no pancreatic ductal neoplasm seen but on the resected specimen stomach cancer was noted.  It was poorly differentiated adenocarcinoma with poorly cohesive/signet ring cell type with proximal gastric mucosal and serosal margins being positive.  There was lymphovascular and perineural invasion seen.  22 lymph nodes were sampled and all were benign.  It was a pT4aN0 lesion.  HER-2/christine FISH was not amplified.       -EUS on 3/3/2020 without clear evidence of neoplasm endoscopically. Left gastric lymph node  fine-needle aspiration was negative for carcinoma. Biopsy from the gastric jejunal anastomosis as well as lesser curvature of the stomach also did not show any malignancy.  -She had a PET/CT on 3/13/2020 which showed soft tissue streaky density with increased FDG uptake adjacent to the pancreaticojejunostomy which could be neoplastic in origin.  There is mild increased FDG uptake in the gastric remnant.  Focal area of soft tissue thickening with fatty streakiness along medial laparotomy with increased FDG uptake which likely is inflammatory. Increased FDG uptake in thoracic esophagus which could be esophagitis.   -She started on chemotherapy with 5FU, oxaliplatin, and Taxotere on 3/27/20. She was seen on 4/7/20 for evaluation of fevers. She was treated for possible pneumonia with Levaquin.   -She was seen in clinic 4/13/20 for RUQ pain,CT and labs reassuring. She received cycles 2 and 3 of chemotherapy on 4/20/20 and 5/4/20.   -She was admitted from 5/21-5/23/20 with neutropenic aspiration pneumonia. She received cycle 4 on 5/27/20 with Neulasta support.   -PET/CT on 6/3/20 showed mild residual uptake at the site of gastrojejunal anastomosis which likely is physiologic.  There is almost resolution of soft tissue nodule next to the pancreaticoduodenal anastomosis without FDG uptake.  There is focal increased uptake in the left posterior acetabulum which could be artifact.   -Decreased dose of oxaliplatin due to neuropathy/cold sensitivity on 6/11. Decreased 5FU to 85% with cycle #6 due to mucositis/diarrhea/taste changes. Treatment was held 7/9/20 due to diarrhea, dehydration, tachycardia, rash, and weight loss and she got cycle 7 on 7/15. On 7/20 she presented to the ED with RUQ abdominal pain, and was admitted 7/20-7/29 for acute cholangitis    -She received C#8 FLOT on 8/14/2020.   -PET/CT on 9/2/2020 did not show evidence of disease.  Small peritoneal nodule was a stable.  This was most consistent with fat  necrosis.   -CT chest abdomen and pelvis on 9/23/2020 also does not show evidence of malignancy recurrence and the peritoneal nodule in the right upper quadrant is stable.  -Before definite surgery she had diagnostic laparoscopy which was negative for evidence of malignancy so on 10/21/2020 she had exploratory laparotomy with extensive lysis of additions, partial omentectomy, resection of Gastrojejunal Anastomosis w/ En Bloc Subtotal Gastrectomy and modified D2 Lymphadenectomy with David-en-Y Gastrojejunostomy Reconstruction.  Final pathology showed Focal residual poorly differentiated adenocarcinoma (0.4cm) with signet-ring morphology status    post-neoadjuvant chemotherapy and all 9 lymph nodes were benign.  All margins were negative.  It was a pT1bN0 lesion.   -Before definite surgery she had diagnostic laparoscopy which was negative for evidence of malignancy so on 10/21/2020 she had exploratory laparotomy with extensive lysis of additions, partial omentectomy, resection of Gastrojejunal Anastomosis w/ En Bloc Subtotal Gastrectomy and modified D2 Lymphadenectomy with David-en-Y Gastrojejunostomy Reconstruction.  Final pathology showed Focal residual poorly differentiated adenocarcinoma (0.4cm) with signet-ring morphology status    post-neoadjuvant chemotherapy and all 9 lymph nodes were benign.  All margins were negative.  It was a [id number] lesion.   -She was admitted from 11/4/2020-11/6/2020 for nausea vomiting and left upper quadrant pain and CT scan showed fluid collection in the left upper quadrant, likely hematoma.  Upper GI was negative for leak.  No drainage was recommended by IR.  He was discharged on 7 days of levofloxacin and Flagyl.  -11/20/2020.  CT abdomen and pelvis showed postsurgical changes of Whipple and subtotal gastrectomy with David-en-Y reconstruction.  There is resolution of previously demonstrated fluid collection between the stomach and the spleen and decrease in sigmoid thickening.  Small  left pleural effusion with atelectasis adjacent to it.   -12/7/2020.  EGD showed healthy-appearing and generous pouch (remaining the stomach) with widely patent end to side gastrojejunostomy, though with the David/enteral limb mildly stenostic and tortuous at its origin. Therefore a covered metal stent was placed from stomach to jejunum across the stenotic region, resulting in straightening of the course.   -2/8/2021.  EGD was done and stent was removed.  -5/21/2021.  CT chest abdomen and pelvis does not show evidence of recurrence of malignancy.  -5/19/22- Last saw Dr. Pollard for follow up. Continues with duloxetine and QID oxycodone for neuropathy. Plan for repeat CT CAT in 6 months, repeat EGD on as needed basis. No evidence of recurrence  -6/8/22- EMG done with Dr. Avalos. 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  -1/11/2023- EMG with Dr. Avalos. 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 No evidence of left lumbosacral radiculopathy.  2/1/23- Last visit with Dr. Millard. Continues with neuropathic pain and joint pains. Plan to taper oxycodone, possible retrial of Butrans patch if she si unable to wean oxycodone. May benefit from pain clinic. Continue cymbalta at 60 mg daily. Recommended retrying compression stockings. Hand deformities, may need arthritis work up.    Symptoms,  Patient was seen for a return visit today.  She has leg swelling in both feet, swelling is at ankle level and in both feet. Does not progress up past ankle level.  Neuropathy pain is still in hands, feet and calves. It has not changed and she feels like it is exacerbated when she is on her feet.   She also has arthritis like changes/deformities in her hands. Her neuropathic symptoms also progress past her hands up towards her elbow.   She states that the taper of oxycodone has been rough, especially with the weather change/cold. She feels it in her wrists and hands  the most.  She is still on cymbalta and feels like it helps both with her depression and neuropathic symptoms.    Therapies/HEP,  Not currently doing therapy.      Functionally,   No changes.      Social history is unchanged.      Medications:  Current Outpatient Medications   Medication Sig Dispense Refill     amitriptyline (ELAVIL) 75 MG tablet Take 1 tablet (75 mg) by mouth At Bedtime 30 tablet 3     COMPOUNDED NON-CONTROLLED SUBSTANCE (CMPD RX) - PHARMACY TO MIX COMPOUNDED MEDICATION Estriol 1 mg/gram,place 1 gram vaginally three times per week,42.5 grams 1 each 3     conjugated estrogens (PREMARIN) 0.625 MG/GM vaginal cream Place 1 g vaginally three times a week 30 g 3     Cyanocobalamin 1000 MCG CAPS Take 1 tablet by mouth every morning (Patient not taking: Reported on 11/2/2022)       cyclobenzaprine (FLEXERIL) 10 MG tablet TAKE 1 TABLET BY MOUTH 3 TIMES DAILY AS NEEDED FOR MUSCLE SPASMS 90 tablet 3     DULoxetine (CYMBALTA) 60 MG capsule Take 1 capsule (60 mg) by mouth daily 30 capsule 3     famotidine (PEPCID) 20 MG tablet TAKE 1 TABLET (20 MG) BY MOUTH 2 TIMES DAILY AS NEEDED 180 tablet 1     fluticasone (FLONASE) 50 MCG/ACT nasal spray Spray 1 spray into both nostrils daily at 2 pm       hydrOXYzine (VISTARIL) 25 MG capsule Take 25-50 mg by mouth 4 times daily as needed for itching or anxiety       loperamide (IMODIUM) 2 MG capsule Take 4 mg by mouth 4 times daily as needed for diarrhea (Patient not taking: Reported on 11/2/2022)       loratadine-pseudoePHEDrine (CLARITIN-D 24-HOUR)  MG 24 hr tablet Take 1 tablet by mouth daily as needed for allergies Usually takes in Spring/Fall       ondansetron (ZOFRAN) 8 MG tablet Take 1 tablet (8 mg) by mouth every 8 hours as needed (Nausea/Vomiting) 90 tablet 2     oxyCODONE (ROXICODONE) 5 MG tablet Take 1 tablet (5 mg) by mouth 3 times daily as needed for moderate to severe pain Maximum 3 pills/day. 90 tablet 0     pantoprazole (PROTONIX) 40 MG EC tablet TAKE  1 TABLET BY MOUTH TWICE A  tablet 1     sennosides (SENOKOT) 8.6 MG tablet 1-2 tablets 1-2 times daily as needed. 120 tablet 1     simethicone 80 MG TABS Take 1 tablet by mouth every 6 hours as needed (bloating, gas, belching) 90 tablet 3     sulfamethoxazole-trimethoprim (BACTRIM DS) 800-160 MG tablet Take 1 tablet by mouth 2 times daily (Patient not taking: Reported on 2/1/2023) 10 tablet 0     SUMAtriptan (IMITREX) 100 MG tablet Take 100 mg by mouth at onset of headache for migraine                Physical Exam:   LMP 09/23/2014   Gen: NAD, pleasant and cooperative  HEENT: Normocephalic, atraumatic, extra-ocular movements appear intact  Pulm: non-labored breathing in room air  Ext: mild edema in BLE, no tenderness in calves, bilateral hand deformities, L>R, especially at DIP joints, swelling, TTP  Neuro/MSK:   Orientation: Oriented to person, place, time, situation. Exhibits good insight into her condition and ongoing management/symptoms.  Motor: 5/5 with bilateral shoulder abduction, elbow extension, wrist extension and  strength/finger intrinsics. 5/5 with bilateral hip flexion, 5/5 with bilateral knee extension, ankle dorsiflexion, EHL, plantar flexion.   Sensory: intact to light touch throughout all dermatomes in bilateral lower extremities.      Labs/Imaging:  Lab Results   Component Value Date    WBC 6.4 11/28/2022    HGB 12.3 11/28/2022    HCT 38.7 11/28/2022    MCV 92 11/28/2022     11/28/2022     Lab Results   Component Value Date     11/28/2022    POTASSIUM 4.8 11/28/2022    CHLORIDE 101 11/28/2022    CO2 29 11/28/2022    GLC 83 11/28/2022     Lab Results   Component Value Date    GFRESTIMATED 63 11/28/2022    GFRESTBLACK 81 05/21/2021     Lab Results   Component Value Date    AST 23 11/28/2022    ALT 16 11/28/2022    ALKPHOS 115 (H) 11/28/2022    BILITOTAL 0.3 11/28/2022     Lab Results   Component Value Date    INR 1.21 (H) 12/07/2020     Lab Results   Component Value Date     BUN 11.9 11/28/2022    CR 1.04 (H) 11/28/2022              Assessment/Plan   Nathalie Bashir presents to clinic today for follow up reg her rehab needs.   She has h/o gastric adenocarcinoma (diagnosed during whipple in 1/2020 for presumed IPMN, no pancreatic malignancy found). Was last seen in clinic on 6/22/22. Bilateral LE EMG results were reviewed with Nathalie today. She has a Rheumatology evaluation scheduled, and will plan for bilateral hand xrays to evaluate joint deformities/arthritic changes. Should try to wear her compression stockings daily to help with leg swelling. Continue pain medication regimen per Palliative team recommendations including Cymbalta for neuropathic pain. Will plan a 3 month return visit. Nathalie is in agreement with the above plan.      1. Work-up:  1. LE EMG results were reviewed with patient today. 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   2. Bilateral hand XRs were ordered to further evaluate notable joint deformities on exam/arthritic changes.  2. Therapy/equipment/braces:  1. Wear compression stockings for lower extremity swelling. Wear compression when up and active daily and can be taken off at night for rest.  3. Medications:  1. Continue weaning oxycodone per Palliative Care instructions, follow up with Palliative team in May. Continue cymbalta for depression and neuropathy symptoms.  4. Referral / follow up with other providers:  1. Follow up with Rheumatology as planned.  5. Follow up: 3 months.      Apurva Dobbs MD  Physical Medicine & Rehabilitation      50 minutes spent on the date of the encounter doing chart review, history and exam, documentation and further activities as noted above.

## 2023-03-31 ENCOUNTER — ANCILLARY PROCEDURE (OUTPATIENT)
Dept: GENERAL RADIOLOGY | Facility: CLINIC | Age: 58
End: 2023-03-31
Attending: STUDENT IN AN ORGANIZED HEALTH CARE EDUCATION/TRAINING PROGRAM
Payer: MEDICARE

## 2023-03-31 DIAGNOSIS — G62.0 PERIPHERAL NEUROPATHY DUE TO CHEMOTHERAPY (H): ICD-10-CM

## 2023-03-31 DIAGNOSIS — T45.1X5A PERIPHERAL NEUROPATHY DUE TO CHEMOTHERAPY (H): ICD-10-CM

## 2023-03-31 DIAGNOSIS — C16.3 MALIGNANT NEOPLASM OF PYLORIC ANTRUM (H): ICD-10-CM

## 2023-03-31 DIAGNOSIS — D49.0 IPMN (INTRADUCTAL PAPILLARY MUCINOUS NEOPLASM): ICD-10-CM

## 2023-03-31 PROCEDURE — 73120 X-RAY EXAM OF HAND: CPT | Mod: TC | Performed by: RADIOLOGY

## 2023-04-03 ENCOUNTER — MYC REFILL (OUTPATIENT)
Dept: PALLIATIVE CARE | Facility: CLINIC | Age: 58
End: 2023-04-03
Payer: MEDICARE

## 2023-04-03 DIAGNOSIS — M79.2 NEUROPATHIC PAIN: ICD-10-CM

## 2023-04-03 DIAGNOSIS — G89.3 CHRONIC PAIN DUE TO NEOPLASM: ICD-10-CM

## 2023-04-03 DIAGNOSIS — M62.838 MUSCLE SPASM: ICD-10-CM

## 2023-04-03 RX ORDER — OXYCODONE HYDROCHLORIDE 5 MG/1
5 TABLET ORAL 3 TIMES DAILY PRN
Qty: 90 TABLET | Refills: 0 | Status: SHIPPED | OUTPATIENT
Start: 2023-04-06 | End: 2023-04-04

## 2023-04-03 NOTE — TELEPHONE ENCOUNTER
[facility]: Palliative Care                                                                                        Received mychart message from patient requesting refill of oxycodone.     Last refill: 3/9/2023  Last office visit: 2/1/2023  Scheduled for follow up 5/17/2023     Will route request to MD for review.     Reviewed [place]  Report.       Signature:  Martine Russell, RENNYN, RN, OCN

## 2023-04-04 RX ORDER — OXYCODONE HYDROCHLORIDE 5 MG/1
5 TABLET ORAL 3 TIMES DAILY PRN
Qty: 90 TABLET | Refills: 0 | Status: SHIPPED | OUTPATIENT
Start: 2023-04-04 | End: 2023-05-01

## 2023-04-04 NOTE — TELEPHONE ENCOUNTER
[facility]: Palliative Care                                                                                        Received VM from patient - she is aware oxycodone has been sent in dated for 4/6/2023. She is wondering if MD would approve for refill today. She says her mother in law has been living with them (due to her fracturing her femur), her hands have been feeling really bad with neuropathy, and the weather is bad, so she is hoping to get oxycodone sooner than the 6th if possible. Will route to MD for review.     Signature:  Martine Russell, RENNYN, RN, OCN

## 2023-04-05 RX ORDER — CYCLOBENZAPRINE HCL 10 MG
TABLET ORAL
Qty: 90 TABLET | Refills: 3 | Status: SHIPPED | OUTPATIENT
Start: 2023-04-05 | End: 2023-09-20

## 2023-04-08 DIAGNOSIS — T45.1X5A PERIPHERAL NEUROPATHY DUE TO CHEMOTHERAPY (H): ICD-10-CM

## 2023-04-08 DIAGNOSIS — G62.0 PERIPHERAL NEUROPATHY DUE TO CHEMOTHERAPY (H): ICD-10-CM

## 2023-04-11 RX ORDER — DULOXETIN HYDROCHLORIDE 60 MG/1
CAPSULE, DELAYED RELEASE ORAL
Qty: 30 CAPSULE | Refills: 3 | OUTPATIENT
Start: 2023-04-11

## 2023-04-11 NOTE — TELEPHONE ENCOUNTER
Pt reports she dropped a few capsules in the sink and they started melting before she could get them out. Writer informed of refills on file, recommended she call [facility] to refill and to call triage if there are any issues. Pt voiced understanding.

## 2023-04-28 ENCOUNTER — LAB (OUTPATIENT)
Dept: LAB | Facility: CLINIC | Age: 58
End: 2023-04-28
Attending: INTERNAL MEDICINE
Payer: MEDICARE

## 2023-04-28 PROCEDURE — 250N000011 HC RX IP 250 OP 636: Performed by: INTERNAL MEDICINE

## 2023-04-28 PROCEDURE — 96523 IRRIG DRUG DELIVERY DEVICE: CPT

## 2023-04-28 RX ORDER — HEPARIN SODIUM (PORCINE) LOCK FLUSH IV SOLN 100 UNIT/ML 100 UNIT/ML
500 SOLUTION INTRAVENOUS ONCE
Status: COMPLETED | OUTPATIENT
Start: 2023-04-28 | End: 2023-04-28

## 2023-04-28 RX ADMIN — Medication 500 UNITS: at 10:31

## 2023-04-28 NOTE — NURSING NOTE
"Chief Complaint   Patient presents with     Port Flush     Port flushed with saline and heparin by RN then de-accessed.        Port accessed with 20 gauge 3/4\" gripper needle by RN, labs collected, line flushed with saline and heparin, then de-accessed.      Marlen Simon RN    "

## 2023-05-01 ENCOUNTER — MYC REFILL (OUTPATIENT)
Dept: PALLIATIVE CARE | Facility: CLINIC | Age: 58
End: 2023-05-01
Payer: MEDICARE

## 2023-05-01 DIAGNOSIS — G89.3 CHRONIC PAIN DUE TO NEOPLASM: ICD-10-CM

## 2023-05-01 DIAGNOSIS — M79.2 NEUROPATHIC PAIN: ICD-10-CM

## 2023-05-01 RX ORDER — OXYCODONE HYDROCHLORIDE 5 MG/1
5 TABLET ORAL 3 TIMES DAILY PRN
Qty: 90 TABLET | Refills: 0 | Status: SHIPPED | OUTPATIENT
Start: 2023-05-01 | End: 2023-05-30

## 2023-05-01 NOTE — TELEPHONE ENCOUNTER
Received [employer] message from patient requesting refill of oxycodone.     Last refill: 4/5/23  Last office visit: 2/1/23  Scheduled for follow up 5/17/23     Will route request to MD for review.     Reviewed [place]  Report.

## 2023-05-16 NOTE — PROGRESS NOTES
"Palliative Care Progress Note    Patient Name: Nathalie Bashir  Primary Provider: Alban Yuan    Chief Complaint/Patient ID: 56 yo F with hx of gastric adenocarcinoma              - Found during Whipple 1/2020 for presumed IPMN - no pancreatic malignancy found, but had poorly differentiated adenoca.  Received neoadjuvant chemo (5FU, leucovorin, oxaliplatin, docetaxel) with curative-intent subtotal gastrectomy, David-en-Y gastrojejunostomy 10/2020.  Required stenting of the jejunum for stenosis causing pain, removed 2/8/21.      Chronic and chemo-induced neuropathy - intol of gabapentin, pregabalin. Duloxetine somewhat effective, on TID/QID oxycodone       Last Palliative care appointment: 2/1/23 with me.      Reviewed: Yes    Interim History:  Nathalie Bashir is a 57 year old female who is seen today for follow up with Palliative Care via billable video visit.      Rheumatology appt scheduled in Sept.    States she is really been struggling with her mood lately.  Feels grumpy and more irritable.  She and her  have been fighting a lot more.  She does admit that her mother-in-law has been living with them since January after she had a femur fracture.  She is having to do a lot of cleaning/picking up after her mother-in-law, and it sounds like her  is not helping very much with his mother's care.  Nathalie feels like \"an outsider\" in her own home, as every time she brings up trying to get mother-in-law back to her own home, mother-in-law cries or Nathalie's  gets upset with her.  She states that all of the stress and having to do more activity has increased her pain.  She does not feel that the duloxetine is really doing a good job of supporting her mood anymore, she would like to get off of it transition to something else.  She was on Celexa in the past and thought that this was helpful.     Social History:  Lives with her . Has a son and 3 grandchildren  Retired, worked in " special education.  Social History     Tobacco Use     Smoking status: Former     Packs/day: 0.50     Years: 25.00     Pack years: 12.50     Types: Cigarettes     Quit date: 1/1/2020     Years since quitting: 3.3     Smokeless tobacco: Never   Substance Use Topics     Alcohol use: No     Drug use: No       Family History- Reviewed in Epic.    Allergies   Allergen Reactions     Gluten Meal Palpitations     Amoxicillin-Pot Clavulanate Rash     Patient tolerated Zosyn in 7/2020     Oxycontin [Oxycodone] Other (See Comments)     Confusion, loopy, oxycodone is tolerated.    Pt states she tolerates regular oxycodone, cannot take 12 hour oxycontin.       Pregabalin      interfered with Cymbalta     Topiramate      Tongue numbness, taste alteration, irritable      Acetaminophen Nausea     Urine retention       Dust Mite Extract      Gabapentin Dizziness and GI Disturbance     Ketorolac Headache     Latex Rash     Methadone Fatigue     Mold      Naprosyn [Naproxen] Dizziness and GI Disturbance     Seasonal Allergies      Medications- Reviewed in Epic.    Past Medical History- Reviewed in [facility].    Past Surgical History- Reviewed in Epic.    Review of Systems:   ROS: 10 point ROS neg other than the symptoms noted above in the HPI.    Physical Exam:   Constitutional: Alert, pleasant, no apparent distress. Sitting up in chair.  Eyes: Sclera non-icteric, no eye discharge.  ENT: No nasal discharge. Ears grossly normal.  Respiratory: Unlabored respirations. Speaking in full sentences.  Musculoskeletal: Extremities appear normal- no gross deformities noted. No edema noted on upper body.   Skin: No suspicious lesions or rashes on visible skin.  Neurologic: Clear speech, no aphasia. No facial droop.  Psychiatric: Mentation appears normal, appropriate attention. Affect normal/bright. Does not appear anxious or depressed.  Appropriately tearful at times due to stress.      Key Data Reviewed:  LABS: 1/10/23- Cr 1, GFR 66.    Impression &  Recommendations & Counseling:  Nathalie Bashir is a 57 year old female with history of gastric adenocarcinoma found during Whipple 1/2020 for presumed IPMN, no pancreatic malignancy found at that time, now s/p neoadjuvant chemotherapy and curative intent surgery 10/2020.  Is now on surveillance and feeling more back to normal.  Seen for symptom management of peripheral neuropathy that worsened in the setting of chemo.     Pain - Likely multifactorial.  Neuropathic pain is a side effect from chemo, EMG without any evidence of lumbosacral radiculopathy.  She also seems to have some ongoing joint pains involving her ankles, knees, sacrum, and possibly her neck.  -Previously discussed that ongoing concern for chronic opioid use with her type of pain.  She had some dizziness with buprenorphine patch in the past, however not sure what dose.  She is also reached more of a survivorship status with her cancer, and with chronic pain with no evidence of ongoing cancer, palliative care would typically transition pain management either to primary care or a pain specialist.  She has mostly been able to taper down oxycodone to 3 pills daily.  -Given all of the stress that she has been under lately as well as our elimination of Cymbalta from her regimen, will not make any changes to pain management right now.  Did discuss that going forward, we will need to either decrease oxycodone further, do retrial of Butrans patch 5 mcg, or she can explore other alternative pain clinic options.  -Rheumatology referral appointment set up for September.    Mood - Increased anxiety and depression symptoms due to stress of her social situation with her mother-in-law.  Validated her feelings today and tried to provide some framework for moving forward in getting Nathalie the help that she needs in dealing with her in-laws and her .  She also feels that her current medication regimen is not fully supporting her mood and would like to make a  change.  -Decrease Cymbalta to 30 mg daily for 7 days while simultaneously starting Celexa 10 mg daily.  -After 7 days, stop Cymbalta and increase Celexa to 20 mg daily.  -Asked her to check in with palliative care RN about a month after starting Celexa.  We can adjust the dose further.  -Recommended she connect with social work either at her father-in-law's nursing home or through mother-in-law's primary care office.  Something her mother-in-law was also provided a book regarding home care options, so I suggest that Nathalie look into this as well.  -I also encouraged Nathalie to go on a long weekend away so that she has time to recover and destress.      Follow up: 3 to 4 months    Video-Visit Details  Video Start Time:  10:39 AM  Video End Time:  11:15 AM    Originating Location (pt. Location): Home     Distant Location (provider location):  Offsite- Personal Home      Platform used for Video Visit: Miguel A     Total time spent on day of encounter is 53 mins, including reviewing record, review of above studies, above visit with patient, symptomatic discussion of mood and stress as well as pain, including medication adjustments/prescription management, and documentation.     Karla Millard,   Palliative Medicine   AMCOM ID [id number]    Some chart documentation performed using Dragon Voice recognition Software. Although reviewed after completion, some words and grammatical errors may remain.

## 2023-05-17 ENCOUNTER — LAB (OUTPATIENT)
Dept: LAB | Facility: CLINIC | Age: 58
End: 2023-05-17
Attending: STUDENT IN AN ORGANIZED HEALTH CARE EDUCATION/TRAINING PROGRAM
Payer: MEDICARE

## 2023-05-17 ENCOUNTER — ANCILLARY PROCEDURE (OUTPATIENT)
Dept: CT IMAGING | Facility: CLINIC | Age: 58
End: 2023-05-17
Attending: INTERNAL MEDICINE
Payer: MEDICARE

## 2023-05-17 ENCOUNTER — PATIENT OUTREACH (OUTPATIENT)
Dept: PALLIATIVE CARE | Facility: CLINIC | Age: 58
End: 2023-05-17

## 2023-05-17 ENCOUNTER — VIRTUAL VISIT (OUTPATIENT)
Dept: ONCOLOGY | Facility: CLINIC | Age: 58
End: 2023-05-17
Attending: STUDENT IN AN ORGANIZED HEALTH CARE EDUCATION/TRAINING PROGRAM
Payer: MEDICARE

## 2023-05-17 DIAGNOSIS — M25.50 MULTIPLE JOINT PAIN: ICD-10-CM

## 2023-05-17 DIAGNOSIS — C16.3 MALIGNANT NEOPLASM OF PYLORIC ANTRUM (H): ICD-10-CM

## 2023-05-17 DIAGNOSIS — Z85.028 HISTORY OF GASTRIC CANCER: ICD-10-CM

## 2023-05-17 DIAGNOSIS — F33.41 MDD (MAJOR DEPRESSIVE DISORDER), RECURRENT, IN PARTIAL REMISSION (H): Primary | ICD-10-CM

## 2023-05-17 DIAGNOSIS — Z51.5 ENCOUNTER FOR PALLIATIVE CARE: ICD-10-CM

## 2023-05-17 DIAGNOSIS — F43.9 STRESS: ICD-10-CM

## 2023-05-17 DIAGNOSIS — G62.0 PERIPHERAL NEUROPATHY DUE TO CHEMOTHERAPY (H): ICD-10-CM

## 2023-05-17 DIAGNOSIS — T45.1X5A PERIPHERAL NEUROPATHY DUE TO CHEMOTHERAPY (H): ICD-10-CM

## 2023-05-17 LAB
ALBUMIN SERPL BCG-MCNC: 3.9 G/DL (ref 3.5–5.2)
ALP SERPL-CCNC: 123 U/L (ref 35–104)
ALT SERPL W P-5'-P-CCNC: 9 U/L (ref 10–35)
ANION GAP SERPL CALCULATED.3IONS-SCNC: 8 MMOL/L (ref 7–15)
AST SERPL W P-5'-P-CCNC: 19 U/L (ref 10–35)
BASOPHILS # BLD AUTO: 0 10E3/UL (ref 0–0.2)
BASOPHILS NFR BLD AUTO: 1 %
BILIRUB SERPL-MCNC: 0.3 MG/DL
BUN SERPL-MCNC: 12.8 MG/DL (ref 6–20)
CALCIUM SERPL-MCNC: 8.9 MG/DL (ref 8.6–10)
CHLORIDE SERPL-SCNC: 101 MMOL/L (ref 98–107)
CREAT BLD-MCNC: 0.9 MG/DL (ref 0.5–1)
CREAT SERPL-MCNC: 0.9 MG/DL (ref 0.51–0.95)
DEPRECATED HCO3 PLAS-SCNC: 27 MMOL/L (ref 22–29)
EOSINOPHIL # BLD AUTO: 0.1 10E3/UL (ref 0–0.7)
EOSINOPHIL NFR BLD AUTO: 1 %
ERYTHROCYTE [DISTWIDTH] IN BLOOD BY AUTOMATED COUNT: 13.2 % (ref 10–15)
GFR SERPL CREATININE-BSD FRML MDRD: 75 ML/MIN/1.73M2
GFR SERPL CREATININE-BSD FRML MDRD: >60 ML/MIN/1.73M2
GLUCOSE SERPL-MCNC: 153 MG/DL (ref 70–99)
HCT VFR BLD AUTO: 39.5 % (ref 35–47)
HGB BLD-MCNC: 12.9 G/DL (ref 11.7–15.7)
IMM GRANULOCYTES # BLD: 0 10E3/UL
IMM GRANULOCYTES NFR BLD: 0 %
LYMPHOCYTES # BLD AUTO: 2.1 10E3/UL (ref 0.8–5.3)
LYMPHOCYTES NFR BLD AUTO: 35 %
MCH RBC QN AUTO: 30.1 PG (ref 26.5–33)
MCHC RBC AUTO-ENTMCNC: 32.7 G/DL (ref 31.5–36.5)
MCV RBC AUTO: 92 FL (ref 78–100)
MONOCYTES # BLD AUTO: 0.4 10E3/UL (ref 0–1.3)
MONOCYTES NFR BLD AUTO: 7 %
NEUTROPHILS # BLD AUTO: 3.4 10E3/UL (ref 1.6–8.3)
NEUTROPHILS NFR BLD AUTO: 56 %
NRBC # BLD AUTO: 0 10E3/UL
NRBC BLD AUTO-RTO: 0 /100
PLATELET # BLD AUTO: 293 10E3/UL (ref 150–450)
POTASSIUM SERPL-SCNC: 4.6 MMOL/L (ref 3.4–5.3)
PROT SERPL-MCNC: 6.9 G/DL (ref 6.4–8.3)
RBC # BLD AUTO: 4.28 10E6/UL (ref 3.8–5.2)
SODIUM SERPL-SCNC: 136 MMOL/L (ref 136–145)
WBC # BLD AUTO: 6 10E3/UL (ref 4–11)

## 2023-05-17 PROCEDURE — 82565 ASSAY OF CREATININE: CPT | Performed by: PATHOLOGY

## 2023-05-17 PROCEDURE — 71260 CT THORAX DX C+: CPT | Mod: MG | Performed by: RADIOLOGY

## 2023-05-17 PROCEDURE — G1010 CDSM STANSON: HCPCS | Performed by: RADIOLOGY

## 2023-05-17 PROCEDURE — 36591 DRAW BLOOD OFF VENOUS DEVICE: CPT

## 2023-05-17 PROCEDURE — 74177 CT ABD & PELVIS W/CONTRAST: CPT | Mod: MG | Performed by: RADIOLOGY

## 2023-05-17 PROCEDURE — 85025 COMPLETE CBC W/AUTO DIFF WBC: CPT

## 2023-05-17 PROCEDURE — 99215 OFFICE O/P EST HI 40 MIN: CPT | Mod: VID | Performed by: STUDENT IN AN ORGANIZED HEALTH CARE EDUCATION/TRAINING PROGRAM

## 2023-05-17 PROCEDURE — 80053 COMPREHEN METABOLIC PANEL: CPT

## 2023-05-17 RX ORDER — CITALOPRAM HYDROBROMIDE 20 MG/1
20 TABLET ORAL DAILY
Qty: 30 TABLET | Refills: 1 | Status: SHIPPED | OUTPATIENT
Start: 2023-05-17 | End: 2023-06-09

## 2023-05-17 RX ORDER — HEPARIN SODIUM (PORCINE) LOCK FLUSH IV SOLN 100 UNIT/ML 100 UNIT/ML
500 SOLUTION INTRAVENOUS ONCE
Status: DISCONTINUED | OUTPATIENT
Start: 2023-05-17 | End: 2023-05-23 | Stop reason: HOSPADM

## 2023-05-17 RX ORDER — DULOXETIN HYDROCHLORIDE 30 MG/1
30 CAPSULE, DELAYED RELEASE ORAL DAILY
Qty: 7 CAPSULE | Refills: 0 | Status: SHIPPED | OUTPATIENT
Start: 2023-05-17 | End: 2023-08-16

## 2023-05-17 RX ORDER — HEPARIN SODIUM (PORCINE) LOCK FLUSH IV SOLN 100 UNIT/ML 100 UNIT/ML
500 SOLUTION INTRAVENOUS ONCE
Status: COMPLETED | OUTPATIENT
Start: 2023-05-17 | End: 2023-05-17

## 2023-05-17 RX ORDER — IOPAMIDOL 755 MG/ML
96 INJECTION, SOLUTION INTRAVASCULAR ONCE
Status: COMPLETED | OUTPATIENT
Start: 2023-05-17 | End: 2023-05-17

## 2023-05-17 RX ORDER — CITALOPRAM HYDROBROMIDE 10 MG/1
TABLET ORAL
Qty: 51 TABLET | Refills: 1 | Status: SHIPPED | OUTPATIENT
Start: 2023-05-17 | End: 2023-08-16

## 2023-05-17 RX ADMIN — HEPARIN SODIUM (PORCINE) LOCK FLUSH IV SOLN 100 UNIT/ML 500 UNITS: 100 SOLUTION at 13:12

## 2023-05-17 RX ADMIN — IOPAMIDOL 96 ML: 755 INJECTION, SOLUTION INTRAVASCULAR at 13:03

## 2023-05-17 NOTE — PATIENT INSTRUCTIONS
Recommendations:  -We will try transitioning you away from the Cymbalta back to Celexa to see if this helps support your mood better.  I sent new prescriptions for both.  You will take the Cymbalta 30 mg capsules 1/day for 7 days, and you will start the Celexa 1 pill daily.  After the 7 days is over, you will stop the Cymbalta and increase the Celexa to 2 pills.  -After you have been on the Celexa for about a month, please call patient with an update on how things are going.  There is room to increase this further.  -Resources for getting help either into your own home or into your in-laws home for your mother-in-law would include the packet that she has, a  through your father-in-law's nursing home, the home care agency that is going to see him right now, or potentially a care coordinator through your mother-in-law's primary care office.  -I know we previously discussed that we will need to either continue to wean down your oxycodone or try a Butrans patch again in the future.  You are still welcome to look into other options for pain management, including your primary care office versus a different pain clinic.    Follow up: 3 to 4 months.      Reasons to Call    If you are having worsening/uncontrolled symptoms we want you to call!    You or your other physicians make any changes to medications we have prescribed.  -Please call for refills 4-5 days before you will run out of medication.    Important Phone Numbers, including: Refills, scheduling, and general questions     Palliative Care RN: Astrid Santiago - [phone]  *After hours or on weekends. Will connect you with on call MD. [phone]

## 2023-05-17 NOTE — LETTER
"    5/17/2023         RE: Nathalie Bashir  [address] [address]        Dear Colleague,    Thank you for referring your patient, Nathalie Bashir, to the [facility] [place]. Please see a copy of my visit note below.    Palliative Care Progress Note    Patient Name: Nathalie Bashir  Primary Provider: Alban Yuan    Chief Complaint/Patient ID: 56 yo F with hx of gastric adenocarcinoma              - Found during Whipple 1/2020 for presumed IPMN - no pancreatic malignancy found, but had poorly differentiated adenoca.  Received neoadjuvant chemo (5FU, leucovorin, oxaliplatin, docetaxel) with curative-intent subtotal gastrectomy, David-en-Y gastrojejunostomy 10/2020.  Required stenting of the jejunum for stenosis causing pain, removed 2/8/21.      Chronic and chemo-induced neuropathy - intol of gabapentin, pregabalin. Duloxetine somewhat effective, on TID/QID oxycodone       Last Palliative care appointment: 2/1/23 with me.      Reviewed: Yes    Interim History:  Nathalie Bashir is a 57 year old female who is seen today for follow up with Palliative Care via billable video visit.      Rheumatology appt scheduled in Sept.    States she is really been struggling with her mood lately.  Feels grumpy and more irritable.  She and her  have been fighting a lot more.  She does admit that her mother-in-law has been living with them since January after she had a femur fracture.  She is having to do a lot of cleaning/picking up after her mother-in-law, and it sounds like her  is not helping very much with his mother's care.  Nathalie feels like \"an outsider\" in her own home, as every time she brings up trying to get mother-in-law back to her own home, mother-in-law cries or Nathalie's  gets upset with her.  She states that all of the stress and having to do more activity has increased her pain.  She does not feel that the duloxetine is really doing a good job of " supporting her mood anymore, she would like to get off of it transition to something else.  She was on Celexa in the past and thought that this was helpful.     Social History:  Lives with her . Has a son and 3 grandchildren  Retired, worked in special education.  Social History     Tobacco Use     Smoking status: Former     Packs/day: 0.50     Years: 25.00     Pack years: 12.50     Types: Cigarettes     Quit date: 1/1/2020     Years since quitting: 3.3     Smokeless tobacco: Never   Substance Use Topics     Alcohol use: No     Drug use: No       Family History- Reviewed in Epic.    Allergies   Allergen Reactions     Gluten Meal Palpitations     Amoxicillin-Pot Clavulanate Rash     Patient tolerated Zosyn in 7/2020     Oxycontin [Oxycodone] Other (See Comments)     Confusion, loopy, oxycodone is tolerated.    Pt states she tolerates regular oxycodone, cannot take 12 hour oxycontin.       Pregabalin      interfered with Cymbalta     Topiramate      Tongue numbness, taste alteration, irritable      Acetaminophen Nausea     Urine retention       Dust Mite Extract      Gabapentin Dizziness and GI Disturbance     Ketorolac Headache     Latex Rash     Methadone Fatigue     Mold      Naprosyn [Naproxen] Dizziness and GI Disturbance     Seasonal Allergies      Medications- Reviewed in Epic.    Past Medical History- Reviewed in [facility].    Past Surgical History- Reviewed in Epic.    Review of Systems:   ROS: 10 point ROS neg other than the symptoms noted above in the HPI.    Physical Exam:   Constitutional: Alert, pleasant, no apparent distress. Sitting up in chair.  Eyes: Sclera non-icteric, no eye discharge.  ENT: No nasal discharge. Ears grossly normal.  Respiratory: Unlabored respirations. Speaking in full sentences.  Musculoskeletal: Extremities appear normal- no gross deformities noted. No edema noted on upper body.   Skin: No suspicious lesions or rashes on visible skin.  Neurologic: Clear speech, no aphasia. No  facial droop.  Psychiatric: Mentation appears normal, appropriate attention. Affect normal/bright. Does not appear anxious or depressed.  Appropriately tearful at times due to stress.      Key Data Reviewed:  LABS: 1/10/23- Cr 1, GFR 66.    Impression & Recommendations & Counseling:  Nathalie Bashir is a 57 year old female with history of gastric adenocarcinoma found during Whipple 1/2020 for presumed IPMN, no pancreatic malignancy found at that time, now s/p neoadjuvant chemotherapy and curative intent surgery 10/2020.  Is now on surveillance and feeling more back to normal.  Seen for symptom management of peripheral neuropathy that worsened in the setting of chemo.     Pain - Likely multifactorial.  Neuropathic pain is a side effect from chemo, EMG without any evidence of lumbosacral radiculopathy.  She also seems to have some ongoing joint pains involving her ankles, knees, sacrum, and possibly her neck.  -Previously discussed that ongoing concern for chronic opioid use with her type of pain.  She had some dizziness with buprenorphine patch in the past, however not sure what dose.  She is also reached more of a survivorship status with her cancer, and with chronic pain with no evidence of ongoing cancer, palliative care would typically transition pain management either to primary care or a pain specialist.  She has mostly been able to taper down oxycodone to 3 pills daily.  -Given all of the stress that she has been under lately as well as our elimination of Cymbalta from her regimen, will not make any changes to pain management right now.  Did discuss that going forward, we will need to either decrease oxycodone further, do retrial of Butrans patch 5 mcg, or she can explore other alternative pain clinic options.  -Rheumatology referral appointment set up for September.    Mood - Increased anxiety and depression symptoms due to stress of her social situation with her mother-in-law.  Validated her feelings today  and tried to provide some framework for moving forward in getting Nathalie the help that she needs in dealing with her in-laws and her .  She also feels that her current medication regimen is not fully supporting her mood and would like to make a change.  -Decrease Cymbalta to 30 mg daily for 7 days while simultaneously starting Celexa 10 mg daily.  -After 7 days, stop Cymbalta and increase Celexa to 20 mg daily.  -Asked her to check in with palliative care RN about a month after starting Celexa.  We can adjust the dose further.  -Recommended she connect with social work either at her father-in-law's nursing home or through mother-in-law's primary care office.  Something her mother-in-law was also provided a book regarding home care options, so I suggest that Nathalie look into this as well.  -I also encouraged Nathalie to go on a long weekend away so that she has time to recover and destress.      Follow up: 3 to 4 months    Video-Visit Details  Video Start Time:  10:39 AM  Video End Time:  11:15 AM    Originating Location (pt. Location): Home     Distant Location (provider location):  Offsite- Personal Home      Platform used for Video Visit: Miguel A     Total time spent on day of encounter is 53 mins, including reviewing record, review of above studies, above visit with patient, symptomatic discussion of mood and stress as well as pain, including medication adjustments/prescription management, and documentation.     Karla Millard DO  Palliative Medicine   AMCOM ID [id number]    Some chart documentation performed using Dragon Voice recognition Software. Although reviewed after completion, some words and grammatical errors may remain.        Again, thank you for allowing me to participate in the care of your patient.        Sincerely,        Karla Millard DO

## 2023-05-17 NOTE — TELEPHONE ENCOUNTER
[facility]: Palliative Care                                                                                Fax from pharmacy with information from insurance.  For the taper increase of citalopram, insurance with cover 7days of 10mg tab and then will need to change to 20mg tabs vs taking 2 10 mg tabs.    New order routed to MD Burleson  Palliative Care Nurse Coordinator  [phone]

## 2023-05-17 NOTE — LETTER
"    5/17/2023         RE: Nathalie Bashir  [address] [address]        Dear Colleague,    Thank you for referring your patient, Nathalie Bashir, to the [facility] [place]. Please see a copy of my visit note below.    Palliative Care Progress Note    Patient Name: Nathalie Bashir  Primary Provider: Alban Yuan    Chief Complaint/Patient ID: 58 yo F with hx of gastric adenocarcinoma              - Found during Whipple 1/2020 for presumed IPMN - no pancreatic malignancy found, but had poorly differentiated adenoca.  Received neoadjuvant chemo (5FU, leucovorin, oxaliplatin, docetaxel) with curative-intent subtotal gastrectomy, David-en-Y gastrojejunostomy 10/2020.  Required stenting of the jejunum for stenosis causing pain, removed 2/8/21.      Chronic and chemo-induced neuropathy - intol of gabapentin, pregabalin. Duloxetine somewhat effective, on TID/QID oxycodone       Last Palliative care appointment: 2/1/23 with me.      Reviewed: Yes    Interim History:  Nathalie Bashir is a 57 year old female who is seen today for follow up with Palliative Care via billable video visit.      Rheumatology appt scheduled in Sept.    States she is really been struggling with her mood lately.  Feels grumpy and more irritable.  She and her  have been fighting a lot more.  She does admit that her mother-in-law has been living with them since January after she had a femur fracture.  She is having to do a lot of cleaning/picking up after her mother-in-law, and it sounds like her  is not helping very much with his mother's care.  Nathalie feels like \"an outsider\" in her own home, as every time she brings up trying to get mother-in-law back to her own home, mother-in-law cries or Nathalie's  gets upset with her.  She states that all of the stress and having to do more activity has increased her pain.  She does not feel that the duloxetine is really doing a good job of " supporting her mood anymore, she would like to get off of it transition to something else.  She was on Celexa in the past and thought that this was helpful.     Social History:  Lives with her . Has a son and 3 grandchildren  Retired, worked in special education.  Social History     Tobacco Use     Smoking status: Former     Packs/day: 0.50     Years: 25.00     Pack years: 12.50     Types: Cigarettes     Quit date: 1/1/2020     Years since quitting: 3.3     Smokeless tobacco: Never   Substance Use Topics     Alcohol use: No     Drug use: No       Family History- Reviewed in Epic.    Allergies   Allergen Reactions     Gluten Meal Palpitations     Amoxicillin-Pot Clavulanate Rash     Patient tolerated Zosyn in 7/2020     Oxycontin [Oxycodone] Other (See Comments)     Confusion, loopy, oxycodone is tolerated.    Pt states she tolerates regular oxycodone, cannot take 12 hour oxycontin.       Pregabalin      interfered with Cymbalta     Topiramate      Tongue numbness, taste alteration, irritable      Acetaminophen Nausea     Urine retention       Dust Mite Extract      Gabapentin Dizziness and GI Disturbance     Ketorolac Headache     Latex Rash     Methadone Fatigue     Mold      Naprosyn [Naproxen] Dizziness and GI Disturbance     Seasonal Allergies      Medications- Reviewed in Epic.    Past Medical History- Reviewed in [facility].    Past Surgical History- Reviewed in Epic.    Review of Systems:   ROS: 10 point ROS neg other than the symptoms noted above in the HPI.    Physical Exam:   Constitutional: Alert, pleasant, no apparent distress. Sitting up in chair.  Eyes: Sclera non-icteric, no eye discharge.  ENT: No nasal discharge. Ears grossly normal.  Respiratory: Unlabored respirations. Speaking in full sentences.  Musculoskeletal: Extremities appear normal- no gross deformities noted. No edema noted on upper body.   Skin: No suspicious lesions or rashes on visible skin.  Neurologic: Clear speech, no aphasia. No  facial droop.  Psychiatric: Mentation appears normal, appropriate attention. Affect normal/bright. Does not appear anxious or depressed.  Appropriately tearful at times due to stress.      Key Data Reviewed:  LABS: 1/10/23- Cr 1, GFR 66.    Impression & Recommendations & Counseling:  Nathalie Bashir is a 57 year old female with history of gastric adenocarcinoma found during Whipple 1/2020 for presumed IPMN, no pancreatic malignancy found at that time, now s/p neoadjuvant chemotherapy and curative intent surgery 10/2020.  Is now on surveillance and feeling more back to normal.  Seen for symptom management of peripheral neuropathy that worsened in the setting of chemo.     Pain - Likely multifactorial.  Neuropathic pain is a side effect from chemo, EMG without any evidence of lumbosacral radiculopathy.  She also seems to have some ongoing joint pains involving her ankles, knees, sacrum, and possibly her neck.  -Previously discussed that ongoing concern for chronic opioid use with her type of pain.  She had some dizziness with buprenorphine patch in the past, however not sure what dose.  She is also reached more of a survivorship status with her cancer, and with chronic pain with no evidence of ongoing cancer, palliative care would typically transition pain management either to primary care or a pain specialist.  She has mostly been able to taper down oxycodone to 3 pills daily.  -Given all of the stress that she has been under lately as well as our elimination of Cymbalta from her regimen, will not make any changes to pain management right now.  Did discuss that going forward, we will need to either decrease oxycodone further, do retrial of Butrans patch 5 mcg, or she can explore other alternative pain clinic options.  -Rheumatology referral appointment set up for September.    Mood - Increased anxiety and depression symptoms due to stress of her social situation with her mother-in-law.  Validated her feelings today  and tried to provide some framework for moving forward in getting Nathalie the help that she needs in dealing with her in-laws and her .  She also feels that her current medication regimen is not fully supporting her mood and would like to make a change.  -Decrease Cymbalta to 30 mg daily for 7 days while simultaneously starting Celexa 10 mg daily.  -After 7 days, stop Cymbalta and increase Celexa to 20 mg daily.  -Asked her to check in with palliative care RN about a month after starting Celexa.  We can adjust the dose further.  -Recommended she connect with social work either at her father-in-law's nursing home or through mother-in-law's primary care office.  Something her mother-in-law was also provided a book regarding home care options, so I suggest that Nathalie look into this as well.  -I also encouraged Nathalie to go on a long weekend away so that she has time to recover and destress.      Follow up: 3 to 4 months    Video-Visit Details  Video Start Time:  10:39 AM  Video End Time:  11:15 AM    Originating Location (pt. Location): Home     Distant Location (provider location):  Offsite- Personal Home      Platform used for Video Visit: Miguel A     Total time spent on day of encounter is 53 mins, including reviewing record, review of above studies, above visit with patient, symptomatic discussion of mood and stress as well as pain, including medication adjustments/prescription management, and documentation.     Karla Millard DO  Palliative Medicine   AMCOM ID [id number]    Some chart documentation performed using Dragon Voice recognition Software. Although reviewed after completion, some words and grammatical errors may remain.        Again, thank you for allowing me to participate in the care of your patient.        Sincerely,        Karla Millard DO

## 2023-05-17 NOTE — NURSING NOTE
Is the patient currently in the state of [place]? YES    Visit mode:VIDEO    If the visit is dropped, the patient can be reconnected by: VIDEO VISIT: Text to cell phone: [phone]    Will anyone else be joining the visit? NO      How would you like to obtain your AVS? MyChart    Are changes needed to the allergy or medication list? NO    Reason for visit: Video Visit      CISCO Hobbs

## 2023-05-17 NOTE — NURSING NOTE
Chief Complaint   Patient presents with     Port Draw     Port labs     Port accessed by lab RN with 20g 3/4 inch power needle. Labs drawn without incident. Port saline locked and kept accessed for CT. Pt directed to first floor to check in for CT scan.    Meryl Gaffney RN on 5/17/2023 at 12:26 PM

## 2023-05-25 ENCOUNTER — VIRTUAL VISIT (OUTPATIENT)
Dept: ONCOLOGY | Facility: CLINIC | Age: 58
End: 2023-05-25
Attending: INTERNAL MEDICINE
Payer: MEDICARE

## 2023-05-25 VITALS — BODY MASS INDEX: 25.48 KG/M2 | HEIGHT: 69 IN | WEIGHT: 172 LBS

## 2023-05-25 DIAGNOSIS — C16.3 MALIGNANT NEOPLASM OF PYLORIC ANTRUM (H): Primary | ICD-10-CM

## 2023-05-25 PROCEDURE — 99214 OFFICE O/P EST MOD 30 MIN: CPT | Mod: VID | Performed by: INTERNAL MEDICINE

## 2023-05-25 NOTE — LETTER
5/25/2023         RE: Nathalie Bashir  [address] [facility] [address]        Dear Colleague,    Thank you for referring your patient, Nathalie Bashir, to the [facility]. Please see a copy of my visit note below.    Virtual Visit Details    Type of service:  Video Visit   Video Start Time: 12:12 PM  Video End Time:12:16 PM    Originating Location (pt. Location): Home  Distant Location (provider location):  On-site  Platform used for Video Visit: [facility]       Oncology/Hematology Visit Note  May 25, 2023    Reason for Visit: Follow up of gastric adenocarcinoma.    History of Present Illness:     Please see previous notes for detail. I have copied and updated from prior notes.    Ms. Bashir is a 57 year old female with a history of gastric adenocarcinoma.  She was being followed for a pancreatic duct dilatation and pancreatic cyst which was noted in November 2018.  Since May 2019 she started noticing some nausea and vomiting and occasional abdominal discomfort.  She had an ultrasound in August 2019 which was essentially unremarkable.  She had a repeat endoscopic ultrasound on 10/29/2019 which showed increase in size of the cyst as well as dilation of the main pancreatic duct.  FNA and fluid analysis showed mucinous epithelium.  She was referred to Dr. 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  Surprisingly there was no pancreatic ductal neoplasm seen but on the resected specimen stomach cancer was noted.  It was poorly differentiated adenocarcinoma with poorly cohesive/signet ring cell type with proximal gastric mucosal and serosal margins being positive.  There was lymphovascular and perineural invasion seen.  22 lymph nodes were sampled and all were benign.  It was a pT4aN0 lesion.  HER-2/christine FISH was not amplified.         EUS on 3/3/2020 without clear evidence of neoplasm endoscopically. Left gastric lymph node fine-needle aspiration was  negative for carcinoma. Biopsy from the gastric jejunal anastomosis as well as lesser curvature of the stomach also did not show any malignancy.     She had a PET/CT on 3/13/2020 which showed soft tissue streaky density with increased FDG uptake adjacent to the pancreaticojejunostomy which could be neoplastic in origin.  There is mild increased FDG uptake in the gastric remnant.  Focal area of soft tissue thickening with fatty streakiness along medial laparotomy with increased FDG uptake which likely is inflammatory. Increased FDG uptake in thoracic esophagus which could be esophagitis.      She started on chemotherapy with 5FU, oxaliplatin, and Taxotere on 3/27/20. She was seen on 4/7/20 for evaluation of fevers. She was treated for possible pneumonia with Levaquin.      She was seen in clinic 4/13/20 for RUQ pain,CT and labs reassuring. She received cycles 2 and 3 of chemotherapy on 4/20/20 and 5/4/20. She was admitted from 5/21-5/23/20 with neutropenic aspiration pneumonia. She received cycle 4 on 5/27/20 with Neulasta support. PET/CT on 6/3/20 showed mild residual uptake at the site of gastrojejunal anastomosis which likely is physiologic.  There is almost resolution of soft tissue nodule next to the pancreaticoduodenal anastomosis without FDG uptake.  There is focal increased uptake in the left posterior acetabulum which could be artifact.     Decreased dose of oxaliplatin due to neuropathy/cold sensitivity on 6/11. Decreased 5FU to 85% with cycle #6 due to mucositis/diarrhea/taste changes. Treatment was held 7/9/20 due to diarrhea, dehydration, tachycardia, rash, and weight loss and she got cycle 7 on 7/15. On 7/20 she presented to the ED with RUQ abdominal pain, and was admitted 7/20-7/29 for acute cholangitis     She received C#8 FLOT on 8/14/2020.    PET/CT on 9/2/2020 did not show evidence of disease.  Small peritoneal nodule was a stable.  This was most consistent with fat necrosis.    CT chest abdomen  and pelvis on 9/23/2020 also does not show evidence of malignancy recurrence and the peritoneal nodule in the right upper quadrant is stable.  Before definite surgery she had diagnostic laparoscopy which was negative for evidence of malignancy so on 10/21/2020 she had exploratory laparotomy with extensive lysis of additions, partial omentectomy, resection of Gastrojejunal Anastomosis w/ En Bloc Subtotal Gastrectomy and modified D2 Lymphadenectomy with David-en-Y Gastrojejunostomy Reconstruction.  Final pathology showed Focal residual poorly differentiated adenocarcinoma (0.4cm) with signet-ring morphology status    post-neoadjuvant chemotherapy and all 9 lymph nodes were benign.  All margins were negative.  It was a pT1bN0 lesion.    Before definite surgery she had diagnostic laparoscopy which was negative for evidence of malignancy so on 10/21/2020 she had exploratory laparotomy with extensive lysis of additions, partial omentectomy, resection of Gastrojejunal Anastomosis w/ En Bloc Subtotal Gastrectomy and modified D2 Lymphadenectomy with David-en-Y Gastrojejunostomy Reconstruction.  Final pathology showed Focal residual poorly differentiated adenocarcinoma (0.4cm) with signet-ring morphology status    post-neoadjuvant chemotherapy and all 9 lymph nodes were benign.  All margins were negative.  It was a drQ3aE4 lesion.    She was admitted from 11/4/2020-11/6/2020 for nausea vomiting and left upper quadrant pain and CT scan showed fluid collection in the left upper quadrant, likely hematoma.  Upper GI was negative for leak.  No drainage was recommended by IR.  He was discharged on 7 days of levofloxacin and Flagyl.      11/20/2020.  CT abdomen and pelvis showed postsurgical changes of Whipple and subtotal gastrectomy with David-en-Y reconstruction.  There is resolution of previously demonstrated fluid collection between the stomach and the spleen and decrease in sigmoid thickening.  Small left pleural effusion with  atelectasis adjacent to it.    12/7/2020.  EGD showed healthy-appearing and generous pouch (remaining the stomach) with widely patent end to side gastrojejunostomy, though with the David/enteral limb mildly stenostic and tortuous at its origin. Therefore a covered metal stent was placed from stomach to jejunum across the stenotic region, resulting in straightening of the course.    2/8/2021.  EGD was done and stent was removed.    5/21/2021.  CT chest abdomen and pelvis does not show evidence of recurrence of malignancy.  11/15/2021-CT chest abdomen and pelvis without evidence of recurrence.  5/17/2022.  CT chest abdomen and pelvis without evidence of recurrence.    She was admitted to the hospital from 8/22/2020 - 8/26/2022 for right-sided pyelonephritis and E. coli bacteremia.  I reviewed the discharge summary.    She was started on meropenem and then ceftriaxone and discharged on levofloxacin.    In September 2022, she had a trauma and fractured her left wrist which required ORIF.      Interval History:  This is a video visit.    She feels well.  Occasionally feels a sharp pain around her lower rib cage area.  This has been off-and-on going on since surgery.  Golzarian is 1.  Eating well.  No nausea or vomiting.  No diarrhea constipation.  No bleeding.  No shortness of breath.  Energy is fine.  She has mild residual neuropathy of fingers and feet but she is able to deal with it nicely.    ECOG 1    ROS:  Otherwise a comprehensive review of the system was unremarkable.    I reviewed other history in epic as below.      Current Outpatient Medications   Medication Sig Dispense Refill    amitriptyline (ELAVIL) 75 MG tablet Take 1 tablet (75 mg) by mouth At Bedtime 30 tablet 3    citalopram (CELEXA) 10 MG tablet Take 1 tablet by mouth daily for 7 days, then increase to 2 tablets daily. 51 tablet 1    citalopram (CELEXA) 20 MG tablet Take 1 tablet (20 mg) by mouth daily 30 tablet 1    COMPOUNDED NON-CONTROLLED SUBSTANCE  (CMPD RX) - PHARMACY TO MIX COMPOUNDED MEDICATION Estriol 1 mg/gram,place 1 gram vaginally three times per week,42.5 grams (Patient not taking: Reported on 3/30/2023) 1 each 3    Cyanocobalamin 1000 MCG CAPS Take 1 tablet by mouth every morning      cyclobenzaprine (FLEXERIL) 10 MG tablet TAKE 1 TABLET BY MOUTH 3 TIMES DAILY AS NEEDED FOR MUSCLE SPASMS 90 tablet 3    DULoxetine (CYMBALTA) 30 MG capsule Take 1 capsule (30 mg) by mouth daily 7 capsule 0    famotidine (PEPCID) 20 MG tablet TAKE 1 TABLET (20 MG) BY MOUTH 2 TIMES DAILY AS NEEDED 180 tablet 1    fluticasone (FLONASE) 50 MCG/ACT nasal spray Spray 1 spray into both nostrils daily at 2 pm      hydrOXYzine (VISTARIL) 25 MG capsule Take 25-50 mg by mouth 4 times daily as needed for itching or anxiety      loperamide (IMODIUM) 2 MG capsule Take 4 mg by mouth 4 times daily as needed for diarrhea (Patient not taking: Reported on 11/2/2022)      loratadine-pseudoePHEDrine (CLARITIN-D 24-HOUR)  MG 24 hr tablet Take 1 tablet by mouth daily as needed for allergies Usually takes in Spring/Fall (Patient not taking: Reported on 3/30/2023)      ondansetron (ZOFRAN) 8 MG tablet Take 1 tablet (8 mg) by mouth every 8 hours as needed (Nausea/Vomiting) 90 tablet 2    oxyCODONE (ROXICODONE) 5 MG tablet Take 1 tablet (5 mg) by mouth 3 times daily as needed for breakthrough pain or moderate to severe pain Maximum 3 pills/day. 90 tablet 0    pantoprazole (PROTONIX) 40 MG EC tablet TAKE 1 TABLET BY MOUTH TWICE A  tablet 1    sennosides (SENOKOT) 8.6 MG tablet 1-2 tablets 1-2 times daily as needed. (Patient not taking: Reported on 3/30/2023) 120 tablet 1    simethicone 80 MG TABS Take 1 tablet by mouth every 6 hours as needed (bloating, gas, belching) (Patient not taking: Reported on 3/30/2023) 90 tablet 3    SUMAtriptan (IMITREX) 100 MG tablet Take 100 mg by mouth at onset of headache for migraine (Patient not taking: Reported on 3/30/2023)         Physical  Examination:    LMP 09/23/2014   Wt Readings from Last 10 Encounters:   03/30/23 79.4 kg (175 lb)   01/24/23 77.1 kg (170 lb)   09/22/22 80.7 kg (178 lb)   09/21/22 81 kg (178 lb 8 oz)   09/18/22 80.7 kg (178 lb)   09/15/22 80.8 kg (178 lb 1.6 oz)   08/22/22 82.1 kg (181 lb)   06/27/22 77.8 kg (171 lb 8.3 oz)   05/19/22 77.8 kg (171 lb 9.6 oz)   04/28/22 77.1 kg (170 lb)           Constitutional.  Looks well and in no apparent distress.   Eyes.  Without eye redness or apparent jaundice.   Respiratory.  Non labored breathing. Speaking in full sentences.    Skin.  No concerning skin rashes on the skin visualized.   Neurological.  Is alert and oriented.  Psychiatric.  Mood and affect seem appropriate.      The rest of a comprehensive physical examination is deferred due to Public Health Emergency video visit restrictions.        Laboratory Data:     Reviewed  5/17/2023  CBC unremarkable   CMP unremarkable except alkaline phosphatase 123.      CT chest abdomen and pelvis on 5/17/2023 does not show any evidence of malignancy.  No suspicious lung nodules/infiltrates.      2/8/2021.  EGD showed David en Y anatomy with generous pouch and stent bridging the GJ and proximal David   The stent was removed and a relook endoscopies showed widely patent prioximal David and associated superficial ulceration          Assessment and Plan:    Gastric adenocarcinona, HER-2 negative.   - Started on neoadjuvant FLOT on 3/7/2020. She has required a dose reduction of her oxaliplatin by 30% due to cold sensitivity/neuropathy with cycle #5. 5FU was decreased to 85% with cycle #6 due to mucositis/diarrhea/taste changes.   Received C#8 on 8/14/2020.  PET/CT on 9/2/2020 did not show evidence of disease.  Small peritoneal nodule was stable.  This was most consistent with fat necrosis.    CT chest abdomen and pelvis on 9/23/2020 also does not show evidence of malignancy recurrence and the peritoneal nodule in the right upper quadrant is  stable.    Before definite surgery she had diagnostic laparoscopy which was negative for evidence of malignancy so on 10/21/2020 she had exploratory laparotomy with extensive lysis of additions, partial omentectomy, resection of Gastrojejunal Anastomosis w/ En Bloc Subtotal Gastrectomy and modified D2 Lymphadenectomy with David-en-Y Gastrojejunostomy Reconstruction.  Final pathology showed Focal residual poorly differentiated adenocarcinoma (0.4cm) with signet-ring morphology status    post-neoadjuvant chemotherapy and all 9 lymph nodes were benign.  All margins were negative.  It was a nbB0yD4 lesion.    We decided to keep her on observation as she had received 6 months of neoadjuvant chemotherapy and then the definite surgery.     Currently doing well.  No evidence of malignancy.  Continue to monitor.  Repeat CT chest abdomen and pelvis every 6 months and as needed EGD.      Anemia.  Now resolved.  Previously she had iron deficiency anemia and she received INFeD on 10/16/2020.  Anemia resolved but she became anemic again.  Iron studies were unremarkable in May 2022.  B12 and folate were normal.  Erythropoietin was inappropriately low.          Port-A-Cath.  Port flushes every 8 weeks.  She wants to get this removed if next scans in 6 months are clear.    We did not address the following today.  Pyelonephritis.  E. coli bacteremia.  Repeat blood cultures were negative.  Now off antibiotics.  Initially there was a consideration to remove the Port-A-Cath but she improved and blood cultures became negative so port is still there.        Neuropathy.  She has chemotherapy related neuropathy.  In the past she tried acupuncture but it upset her nerve so she stopped it. She was intolerant to gabapentin and pregabalin. She is taking duloxetine and QID oxycodone. She is following with Palliative care.  We had previously discussed about trying laser therapy.        Abdominal pain.  This improved after EGD in December 2020 with  a covered metal stent was placed from stomach to jejunum across the stenotic region of David/enteral limb.  The stent was removed in February 2021.       Return to clinic in 6 months with labs/CT scan prior.    All questions answered and she is agreeable with the plan.    Magaly Pollard MD

## 2023-05-25 NOTE — NURSING NOTE
Is the patient currently in the state of [place]? YES    Visit mode:VIDEO    If the visit is dropped, the patient can be reconnected by: VIDEO VISIT: Send to e-mail at: [email or web address]    Will anyone else be joining the visit? NO      How would you like to obtain your AVS? MyChart    Are changes needed to the allergy or medication list? NO    Reason for visit: Video Visit (Follow Up )      Katey Jones

## 2023-05-25 NOTE — PROGRESS NOTES
Virtual Visit Details    Type of service:  Video Visit   Video Start Time: 12:12 PM  Video End Time:12:16 PM    Originating Location (pt. Location): Home  Distant Location (provider location):  On-site  Platform used for Video Visit: [facility]       Oncology/Hematology Visit Note  May 25, 2023    Reason for Visit: Follow up of gastric adenocarcinoma.    History of Present Illness:     Please see previous notes for detail. I have copied and updated from prior notes.    Ms. Bashir is a 57 year old female with a history of gastric adenocarcinoma.  She was being followed for a pancreatic duct dilatation and pancreatic cyst which was noted in November 2018.  Since May 2019 she started noticing some nausea and vomiting and occasional abdominal discomfort.  She had an ultrasound in August 2019 which was essentially unremarkable.  She had a repeat endoscopic ultrasound on 10/29/2019 which showed increase in size of the cyst as well as dilation of the main pancreatic duct.  FNA and fluid analysis showed mucinous epithelium.  She was referred to Dr. 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  Surprisingly there was no pancreatic ductal neoplasm seen but on the resected specimen stomach cancer was noted.  It was poorly differentiated adenocarcinoma with poorly cohesive/signet ring cell type with proximal gastric mucosal and serosal margins being positive.  There was lymphovascular and perineural invasion seen.  22 lymph nodes were sampled and all were benign.  It was a pT4aN0 lesion.  HER-2/christine FISH was not amplified.         EUS on 3/3/2020 without clear evidence of neoplasm endoscopically. Left gastric lymph node fine-needle aspiration was negative for carcinoma. Biopsy from the gastric jejunal anastomosis as well as lesser curvature of the stomach also did not show any malignancy.     She had a PET/CT on 3/13/2020 which showed soft tissue streaky density with increased FDG uptake adjacent  to the pancreaticojejunostomy which could be neoplastic in origin.  There is mild increased FDG uptake in the gastric remnant.  Focal area of soft tissue thickening with fatty streakiness along medial laparotomy with increased FDG uptake which likely is inflammatory. Increased FDG uptake in thoracic esophagus which could be esophagitis.      She started on chemotherapy with 5FU, oxaliplatin, and Taxotere on 3/27/20. She was seen on 4/7/20 for evaluation of fevers. She was treated for possible pneumonia with Levaquin.      She was seen in clinic 4/13/20 for RUQ pain,CT and labs reassuring. She received cycles 2 and 3 of chemotherapy on 4/20/20 and 5/4/20. She was admitted from 5/21-5/23/20 with neutropenic aspiration pneumonia. She received cycle 4 on 5/27/20 with Neulasta support. PET/CT on 6/3/20 showed mild residual uptake at the site of gastrojejunal anastomosis which likely is physiologic.  There is almost resolution of soft tissue nodule next to the pancreaticoduodenal anastomosis without FDG uptake.  There is focal increased uptake in the left posterior acetabulum which could be artifact.     Decreased dose of oxaliplatin due to neuropathy/cold sensitivity on 6/11. Decreased 5FU to 85% with cycle #6 due to mucositis/diarrhea/taste changes. Treatment was held 7/9/20 due to diarrhea, dehydration, tachycardia, rash, and weight loss and she got cycle 7 on 7/15. On 7/20 she presented to the ED with RUQ abdominal pain, and was admitted 7/20-7/29 for acute cholangitis     She received C#8 FLOT on 8/14/2020.    PET/CT on 9/2/2020 did not show evidence of disease.  Small peritoneal nodule was a stable.  This was most consistent with fat necrosis.    CT chest abdomen and pelvis on 9/23/2020 also does not show evidence of malignancy recurrence and the peritoneal nodule in the right upper quadrant is stable.  Before definite surgery she had diagnostic laparoscopy which was negative for evidence of malignancy so on  10/21/2020 she had exploratory laparotomy with extensive lysis of additions, partial omentectomy, resection of Gastrojejunal Anastomosis w/ En Bloc Subtotal Gastrectomy and modified D2 Lymphadenectomy with David-en-Y Gastrojejunostomy Reconstruction.  Final pathology showed Focal residual poorly differentiated adenocarcinoma (0.4cm) with signet-ring morphology status    post-neoadjuvant chemotherapy and all 9 lymph nodes were benign.  All margins were negative.  It was a pT1bN0 lesion.    Before definite surgery she had diagnostic laparoscopy which was negative for evidence of malignancy so on 10/21/2020 she had exploratory laparotomy with extensive lysis of additions, partial omentectomy, resection of Gastrojejunal Anastomosis w/ En Bloc Subtotal Gastrectomy and modified D2 Lymphadenectomy with David-en-Y Gastrojejunostomy Reconstruction.  Final pathology showed Focal residual poorly differentiated adenocarcinoma (0.4cm) with signet-ring morphology status    post-neoadjuvant chemotherapy and all 9 lymph nodes were benign.  All margins were negative.  It was a [id number] lesion.    She was admitted from 11/4/2020-11/6/2020 for nausea vomiting and left upper quadrant pain and CT scan showed fluid collection in the left upper quadrant, likely hematoma.  Upper GI was negative for leak.  No drainage was recommended by IR.  He was discharged on 7 days of levofloxacin and Flagyl.      11/20/2020.  CT abdomen and pelvis showed postsurgical changes of Whipple and subtotal gastrectomy with David-en-Y reconstruction.  There is resolution of previously demonstrated fluid collection between the stomach and the spleen and decrease in sigmoid thickening.  Small left pleural effusion with atelectasis adjacent to it.    12/7/2020.  EGD showed healthy-appearing and generous pouch (remaining the stomach) with widely patent end to side gastrojejunostomy, though with the David/enteral limb mildly stenostic and tortuous at its origin.  Therefore a covered metal stent was placed from stomach to jejunum across the stenotic region, resulting in straightening of the course.    2/8/2021.  EGD was done and stent was removed.    5/21/2021.  CT chest abdomen and pelvis does not show evidence of recurrence of malignancy.  11/15/2021-CT chest abdomen and pelvis without evidence of recurrence.  5/17/2022.  CT chest abdomen and pelvis without evidence of recurrence.    She was admitted to the hospital from 8/22/2020 - 8/26/2022 for right-sided pyelonephritis and E. coli bacteremia.  I reviewed the discharge summary.    She was started on meropenem and then ceftriaxone and discharged on levofloxacin.    In September 2022, she had a trauma and fractured her left wrist which required ORIF.      Interval History:  This is a video visit.    She feels well.  Occasionally feels a sharp pain around her lower rib cage area.  This has been off-and-on going on since surgery.  Golzarian is 1.  Eating well.  No nausea or vomiting.  No diarrhea constipation.  No bleeding.  No shortness of breath.  Energy is fine.  She has mild residual neuropathy of fingers and feet but she is able to deal with it nicely.    ECOG 1    ROS:  Otherwise a comprehensive review of the system was unremarkable.    I reviewed other history in epic as below.      Current Outpatient Medications   Medication Sig Dispense Refill     amitriptyline (ELAVIL) 75 MG tablet Take 1 tablet (75 mg) by mouth At Bedtime 30 tablet 3     citalopram (CELEXA) 10 MG tablet Take 1 tablet by mouth daily for 7 days, then increase to 2 tablets daily. 51 tablet 1     citalopram (CELEXA) 20 MG tablet Take 1 tablet (20 mg) by mouth daily 30 tablet 1     COMPOUNDED NON-CONTROLLED SUBSTANCE (CMPD RX) - PHARMACY TO MIX COMPOUNDED MEDICATION Estriol 1 mg/gram,place 1 gram vaginally three times per week,42.5 grams (Patient not taking: Reported on 3/30/2023) 1 each 3     Cyanocobalamin 1000 MCG CAPS Take 1 tablet by mouth every  morning       cyclobenzaprine (FLEXERIL) 10 MG tablet TAKE 1 TABLET BY MOUTH 3 TIMES DAILY AS NEEDED FOR MUSCLE SPASMS 90 tablet 3     DULoxetine (CYMBALTA) 30 MG capsule Take 1 capsule (30 mg) by mouth daily 7 capsule 0     famotidine (PEPCID) 20 MG tablet TAKE 1 TABLET (20 MG) BY MOUTH 2 TIMES DAILY AS NEEDED 180 tablet 1     fluticasone (FLONASE) 50 MCG/ACT nasal spray Spray 1 spray into both nostrils daily at 2 pm       hydrOXYzine (VISTARIL) 25 MG capsule Take 25-50 mg by mouth 4 times daily as needed for itching or anxiety       loperamide (IMODIUM) 2 MG capsule Take 4 mg by mouth 4 times daily as needed for diarrhea (Patient not taking: Reported on 11/2/2022)       loratadine-pseudoePHEDrine (CLARITIN-D 24-HOUR)  MG 24 hr tablet Take 1 tablet by mouth daily as needed for allergies Usually takes in Spring/Fall (Patient not taking: Reported on 3/30/2023)       ondansetron (ZOFRAN) 8 MG tablet Take 1 tablet (8 mg) by mouth every 8 hours as needed (Nausea/Vomiting) 90 tablet 2     oxyCODONE (ROXICODONE) 5 MG tablet Take 1 tablet (5 mg) by mouth 3 times daily as needed for breakthrough pain or moderate to severe pain Maximum 3 pills/day. 90 tablet 0     pantoprazole (PROTONIX) 40 MG EC tablet TAKE 1 TABLET BY MOUTH TWICE A  tablet 1     sennosides (SENOKOT) 8.6 MG tablet 1-2 tablets 1-2 times daily as needed. (Patient not taking: Reported on 3/30/2023) 120 tablet 1     simethicone 80 MG TABS Take 1 tablet by mouth every 6 hours as needed (bloating, gas, belching) (Patient not taking: Reported on 3/30/2023) 90 tablet 3     SUMAtriptan (IMITREX) 100 MG tablet Take 100 mg by mouth at onset of headache for migraine (Patient not taking: Reported on 3/30/2023)         Physical Examination:    LMP 09/23/2014   Wt Readings from Last 10 Encounters:   03/30/23 79.4 kg (175 lb)   01/24/23 77.1 kg (170 lb)   09/22/22 80.7 kg (178 lb)   09/21/22 81 kg (178 lb 8 oz)   09/18/22 80.7 kg (178 lb)   09/15/22 80.8 kg  (178 lb 1.6 oz)   08/22/22 82.1 kg (181 lb)   06/27/22 77.8 kg (171 lb 8.3 oz)   05/19/22 77.8 kg (171 lb 9.6 oz)   04/28/22 77.1 kg (170 lb)           Constitutional.  Looks well and in no apparent distress.   Eyes.  Without eye redness or apparent jaundice.   Respiratory.  Non labored breathing. Speaking in full sentences.    Skin.  No concerning skin rashes on the skin visualized.   Neurological.  Is alert and oriented.  Psychiatric.  Mood and affect seem appropriate.      The rest of a comprehensive physical examination is deferred due to Public Health Emergency video visit restrictions.        Laboratory Data:     Reviewed  5/17/2023  CBC unremarkable   CMP unremarkable except alkaline phosphatase 123.      CT chest abdomen and pelvis on 5/17/2023 does not show any evidence of malignancy.  No suspicious lung nodules/infiltrates.      2/8/2021.  EGD showed David en Y anatomy with generous pouch and stent bridging the GJ and proximal David   The stent was removed and a relook endoscopies showed widely patent prioximal David and associated superficial ulceration          Assessment and Plan:    Gastric adenocarcinona, HER-2 negative.   - Started on neoadjuvant FLOT on 3/7/2020. She has required a dose reduction of her oxaliplatin by 30% due to cold sensitivity/neuropathy with cycle #5. 5FU was decreased to 85% with cycle #6 due to mucositis/diarrhea/taste changes.   Received C#8 on 8/14/2020.  PET/CT on 9/2/2020 did not show evidence of disease.  Small peritoneal nodule was stable.  This was most consistent with fat necrosis.    CT chest abdomen and pelvis on 9/23/2020 also does not show evidence of malignancy recurrence and the peritoneal nodule in the right upper quadrant is stable.    Before definite surgery she had diagnostic laparoscopy which was negative for evidence of malignancy so on 10/21/2020 she had exploratory laparotomy with extensive lysis of additions, partial omentectomy, resection of Gastrojejunal  Anastomosis w/ En Bloc Subtotal Gastrectomy and modified D2 Lymphadenectomy with David-en-Y Gastrojejunostomy Reconstruction.  Final pathology showed Focal residual poorly differentiated adenocarcinoma (0.4cm) with signet-ring morphology status    post-neoadjuvant chemotherapy and all 9 lymph nodes were benign.  All margins were negative.  It was a [id number] lesion.    We decided to keep her on observation as she had received 6 months of neoadjuvant chemotherapy and then the definite surgery.     Currently doing well.  No evidence of malignancy.  Continue to monitor.  Repeat CT chest abdomen and pelvis every 6 months and as needed EGD.      Anemia.  Now resolved.  Previously she had iron deficiency anemia and she received INFeD on 10/16/2020.  Anemia resolved but she became anemic again.  Iron studies were unremarkable in May 2022.  B12 and folate were normal.  Erythropoietin was inappropriately low.          Port-A-Cath.  Port flushes every 8 weeks.  She wants to get this removed if next scans in 6 months are clear.    We did not address the following today.  Pyelonephritis.  E. coli bacteremia.  Repeat blood cultures were negative.  Now off antibiotics.  Initially there was a consideration to remove the Port-A-Cath but she improved and blood cultures became negative so port is still there.        Neuropathy.  She has chemotherapy related neuropathy.  In the past she tried acupuncture but it upset her nerve so she stopped it. She was intolerant to gabapentin and pregabalin. She is taking duloxetine and QID oxycodone. She is following with Palliative care.  We had previously discussed about trying laser therapy.        Abdominal pain.  This improved after EGD in December 2020 with a covered metal stent was placed from stomach to jejunum across the stenotic region of David/enteral limb.  The stent was removed in February 2021.       Return to clinic in 6 months with labs/CT scan prior.    All questions answered and she  is agreeable with the plan.    Magaly Pollard MD

## 2023-05-27 PROCEDURE — 96375 TX/PRO/DX INJ NEW DRUG ADDON: CPT | Performed by: EMERGENCY MEDICINE

## 2023-05-27 PROCEDURE — 96374 THER/PROPH/DIAG INJ IV PUSH: CPT | Performed by: EMERGENCY MEDICINE

## 2023-05-27 PROCEDURE — 99284 EMERGENCY DEPT VISIT MOD MDM: CPT | Mod: 25 | Performed by: EMERGENCY MEDICINE

## 2023-05-27 PROCEDURE — 99284 EMERGENCY DEPT VISIT MOD MDM: CPT | Performed by: EMERGENCY MEDICINE

## 2023-05-28 ENCOUNTER — HOSPITAL ENCOUNTER (EMERGENCY)
Facility: CLINIC | Age: 58
Discharge: HOME | End: 2023-05-28
Attending: EMERGENCY MEDICINE | Admitting: EMERGENCY MEDICINE
Payer: MEDICARE

## 2023-05-28 VITALS
DIASTOLIC BLOOD PRESSURE: 57 MMHG | HEART RATE: 117 BPM | SYSTOLIC BLOOD PRESSURE: 89 MMHG | TEMPERATURE: 99.1 F | OXYGEN SATURATION: 95 % | RESPIRATION RATE: 20 BRPM

## 2023-05-28 DIAGNOSIS — R51.9 NONINTRACTABLE HEADACHE, UNSPECIFIED CHRONICITY PATTERN, UNSPECIFIED HEADACHE TYPE: ICD-10-CM

## 2023-05-28 LAB
ALBUMIN SERPL BCG-MCNC: 3.5 G/DL (ref 3.5–5.2)
ALP SERPL-CCNC: 99 U/L (ref 35–104)
ALT SERPL W P-5'-P-CCNC: 20 U/L (ref 10–35)
ANION GAP SERPL CALCULATED.3IONS-SCNC: 11 MMOL/L (ref 7–15)
AST SERPL W P-5'-P-CCNC: 22 U/L (ref 10–35)
BASOPHILS # BLD AUTO: 0 10E3/UL (ref 0–0.2)
BASOPHILS NFR BLD AUTO: 0 %
BILIRUB SERPL-MCNC: 0.4 MG/DL
BUN SERPL-MCNC: 13 MG/DL (ref 6–20)
CALCIUM SERPL-MCNC: 7.9 MG/DL (ref 8.6–10)
CHLORIDE SERPL-SCNC: 102 MMOL/L (ref 98–107)
CREAT SERPL-MCNC: 0.92 MG/DL (ref 0.51–0.95)
DEPRECATED HCO3 PLAS-SCNC: 22 MMOL/L (ref 22–29)
EOSINOPHIL # BLD AUTO: 0.1 10E3/UL (ref 0–0.7)
EOSINOPHIL NFR BLD AUTO: 1 %
ERYTHROCYTE [DISTWIDTH] IN BLOOD BY AUTOMATED COUNT: 13.6 % (ref 10–15)
GFR SERPL CREATININE-BSD FRML MDRD: 73 ML/MIN/1.73M2
GLUCOSE SERPL-MCNC: 136 MG/DL (ref 70–99)
HCT VFR BLD AUTO: 37.6 % (ref 35–47)
HGB BLD-MCNC: 11.7 G/DL (ref 11.7–15.7)
IMM GRANULOCYTES # BLD: 0 10E3/UL
IMM GRANULOCYTES NFR BLD: 0 %
LYMPHOCYTES # BLD AUTO: 0.3 10E3/UL (ref 0.8–5.3)
LYMPHOCYTES NFR BLD AUTO: 4 %
MCH RBC QN AUTO: 29.7 PG (ref 26.5–33)
MCHC RBC AUTO-ENTMCNC: 31.1 G/DL (ref 31.5–36.5)
MCV RBC AUTO: 95 FL (ref 78–100)
MONOCYTES # BLD AUTO: 0.4 10E3/UL (ref 0–1.3)
MONOCYTES NFR BLD AUTO: 5 %
NEUTROPHILS # BLD AUTO: 7.4 10E3/UL (ref 1.6–8.3)
NEUTROPHILS NFR BLD AUTO: 90 %
NRBC # BLD AUTO: 0 10E3/UL
NRBC BLD AUTO-RTO: 0 /100
PLATELET # BLD AUTO: 170 10E3/UL (ref 150–450)
POTASSIUM SERPL-SCNC: 4.1 MMOL/L (ref 3.4–5.3)
PROT SERPL-MCNC: 5.9 G/DL (ref 6.4–8.3)
RBC # BLD AUTO: 3.94 10E6/UL (ref 3.8–5.2)
SODIUM SERPL-SCNC: 135 MMOL/L (ref 136–145)
WBC # BLD AUTO: 8.3 10E3/UL (ref 4–11)

## 2023-05-28 PROCEDURE — 250N000012 HC RX MED GY IP 250 OP 636 PS 637: Performed by: EMERGENCY MEDICINE

## 2023-05-28 PROCEDURE — 258N000003 HC RX IP 258 OP 636: Performed by: EMERGENCY MEDICINE

## 2023-05-28 PROCEDURE — 80053 COMPREHEN METABOLIC PANEL: CPT | Performed by: EMERGENCY MEDICINE

## 2023-05-28 PROCEDURE — 250N000011 HC RX IP 250 OP 636: Performed by: EMERGENCY MEDICINE

## 2023-05-28 PROCEDURE — 96375 TX/PRO/DX INJ NEW DRUG ADDON: CPT | Performed by: EMERGENCY MEDICINE

## 2023-05-28 PROCEDURE — 96374 THER/PROPH/DIAG INJ IV PUSH: CPT | Performed by: EMERGENCY MEDICINE

## 2023-05-28 PROCEDURE — 36415 COLL VENOUS BLD VENIPUNCTURE: CPT | Performed by: EMERGENCY MEDICINE

## 2023-05-28 PROCEDURE — 85025 COMPLETE CBC W/AUTO DIFF WBC: CPT | Performed by: EMERGENCY MEDICINE

## 2023-05-28 PROCEDURE — 96372 THER/PROPH/DIAG INJ SC/IM: CPT | Mod: 59 | Performed by: EMERGENCY MEDICINE

## 2023-05-28 RX ORDER — DIPHENHYDRAMINE HYDROCHLORIDE 50 MG/ML
25 INJECTION INTRAMUSCULAR; INTRAVENOUS ONCE
Status: COMPLETED | OUTPATIENT
Start: 2023-05-28 | End: 2023-05-28

## 2023-05-28 RX ORDER — SUMATRIPTAN 6 MG/.5ML
6 INJECTION, SOLUTION SUBCUTANEOUS ONCE
Status: COMPLETED | OUTPATIENT
Start: 2023-05-28 | End: 2023-05-28

## 2023-05-28 RX ORDER — HYDROMORPHONE HYDROCHLORIDE 1 MG/ML
0.5 INJECTION, SOLUTION INTRAMUSCULAR; INTRAVENOUS; SUBCUTANEOUS ONCE
Status: COMPLETED | OUTPATIENT
Start: 2023-05-28 | End: 2023-05-28

## 2023-05-28 RX ORDER — METOCLOPRAMIDE HYDROCHLORIDE 5 MG/ML
5 INJECTION INTRAMUSCULAR; INTRAVENOUS ONCE
Status: COMPLETED | OUTPATIENT
Start: 2023-05-28 | End: 2023-05-28

## 2023-05-28 RX ORDER — SODIUM CHLORIDE 9 MG/ML
INJECTION, SOLUTION INTRAVENOUS CONTINUOUS
Status: DISCONTINUED | OUTPATIENT
Start: 2023-05-28 | End: 2023-05-28 | Stop reason: HOSPADM

## 2023-05-28 RX ORDER — HEPARIN SODIUM (PORCINE) LOCK FLUSH IV SOLN 100 UNIT/ML 100 UNIT/ML
500 SOLUTION INTRAVENOUS ONCE
Status: COMPLETED | OUTPATIENT
Start: 2023-05-28 | End: 2023-05-28

## 2023-05-28 RX ADMIN — SODIUM CHLORIDE: 0.9 INJECTION, SOLUTION INTRAVENOUS at 01:50

## 2023-05-28 RX ADMIN — HYDROMORPHONE HYDROCHLORIDE 0.5 MG: 1 INJECTION, SOLUTION INTRAMUSCULAR; INTRAVENOUS; SUBCUTANEOUS at 00:47

## 2023-05-28 RX ADMIN — DIPHENHYDRAMINE HYDROCHLORIDE 25 MG: 50 INJECTION, SOLUTION INTRAMUSCULAR; INTRAVENOUS at 02:32

## 2023-05-28 RX ADMIN — Medication 500 UNITS: at 02:38

## 2023-05-28 RX ADMIN — SUMATRIPTAN 6 MG: 6 INJECTION, SOLUTION SUBCUTANEOUS at 00:47

## 2023-05-28 RX ADMIN — SODIUM CHLORIDE 1000 ML: 9 INJECTION, SOLUTION INTRAVENOUS at 00:47

## 2023-05-28 RX ADMIN — METOCLOPRAMIDE 5 MG: 5 INJECTION, SOLUTION INTRAMUSCULAR; INTRAVENOUS at 02:32

## 2023-05-28 ASSESSMENT — ACTIVITIES OF DAILY LIVING (ADL): ADLS_ACUITY_SCORE: 33

## 2023-05-28 NOTE — DISCHARGE INSTRUCTIONS
Return to the Emergency Department if you develop worsening headaches, fevers, weakness, visual changes or if you have any further concerns.    Follow up with your primary physician if your headaches are ongoing,.

## 2023-05-28 NOTE — ED TRIAGE NOTES
Pt presents with c/o migraine that started on Thursday. Pt has taken her imitrex with no relief. Pt is nauseated, some dizziness, and ear pain. Pt states the pain is unrelenting     Triage Assessment     Row Name 05/28/23 0004       Triage Assessment (Adult)    Airway WDL WDL       Respiratory WDL    Respiratory WDL WDL       Skin Circulation/Temperature WDL    Skin Circulation/Temperature WDL WDL       Cardiac WDL    Cardiac WDL WDL       Peripheral/Neurovascular WDL    Peripheral Neurovascular WDL WDL       Cognitive/Neuro/Behavioral WDL    Cognitive/Neuro/Behavioral WDL WDL

## 2023-05-28 NOTE — ED PROVIDER NOTES
ED Provider Note  [facility]      History     Chief Complaint   Patient presents with     Headache     HPI  Nathalie Bashir is a 57 year old female who has a history of cancer, migraine, opioid dependence, and depressing presenting with right-sided migraine. The patient reports that she her headache is severe and similar to previous migraines,. Her headache is across her forehead and on the right side. She complains of dizziness, nausea, diarrhea since yesterday. She notes not being able to retain any liquid. She woke up symptomatic on Thursday morning but the pain resolved by nighttime, stating that she Friday woke up with a severe headache. She states that she feels unsteady, reporting that she has neuropathy in her hands and feet but otherwise denies numbness and tingling of the hands and feet. She denies fevers tonight. She wears contacts normally and is able to see normally when they are in tonight. She notes that taking zofran and amlodipine last time for her migraine improved her pain. She has not been on antibiotics recently. She had a CT on 5/17/23. The patient reports that she cannot take NSAIDS or tylenol due to her history of cancer. The patient notes that she has a palliative care team that manages her pain and opioid use.    Past Medical History  Past Medical History:   Diagnosis Date     Allergic rhinitis      Cancer (H)     stomach cancer     Chronic pain      Closed fracture of distal end of left radius      Depression      Gastric adenocarcinoma (H)      Lumbago      Migraine      Opioid dependence (H)      Other chronic pain     low back     Sacroiliitis (H)     low back pain     Trochanteric bursitis     leg length discrrepancy, hip pain     Past Surgical History:   Procedure Laterality Date     ARTHROPLASTY HIP Left 2016     BACK SURGERY      L4-5 decompression     CATARACT IOL, RT/LT       CHOLECYSTECTOMY N/A 1/28/2020    Procedure: Cholecystectomy;  Surgeon:  Yandel Mallory MD;  Location: [facility] OR     ENDOSCOPIC RETROGRADE CHOLANGIOPANCREATOGRAM N/A 7/27/2020    Procedure: ENDOSCOPIC RETROGRADE CHOLANGIOPANCREATOGRAPHY  **Latex Allergy** with jejunal biopsies;  Surgeon: Jeet Aviles MD;  Location: [facility] OR     ESOPHAGOSCOPY, GASTROSCOPY, DUODENOSCOPY (EGD), COMBINED N/A 3/3/2020    Procedure: ESOPHAGOGASTRODUODENOSCOPY, WITH ENDOSCOPIC US (enoxaparin);  Surgeon: Bakari Plata MD;  Location: [facility] GI     ESOPHAGOSCOPY, GASTROSCOPY, DUODENOSCOPY (EGD), COMBINED N/A 12/7/2020    Procedure: Upper Endoscopy with stent placement;  Surgeon: Jeet Aviles MD;  Location:  OR     ESOPHAGOSCOPY, GASTROSCOPY, DUODENOSCOPY (EGD), COMBINED N/A 2/8/2021    Procedure: ESOPHAGOGASTRODUODENOSCOPY (EGD)AND STENT REMOVAL;  Surgeon: Jeet Aviles MD;  Location:  OR     EYE SURGERY       GASTRECTOMY N/A 10/21/2020    Procedure: Open subtotal gastectomy with David en Y Reconstruction; D1 Lymph Node Disection  **Latex Allergy**;  Surgeon: Yandel Mallory MD;  Location: [facility] OR     IR CHEST PORT PLACEMENT > 5 YRS OF AGE  3/26/2020     IR LUMBAR EPIDURAL STEROID INJECTION  8/25/2009     IR LUMBAR EPIDURAL STEROID INJECTION  9/2/2009     IR LUMBAR EPIDURAL STEROID INJECTION  9/25/2009     IR LUMBAR EPIDURAL STEROID INJECTION  11/23/2009     IR LUMBAR EPIDURAL STEROID INJECTION  4/1/2010     IR LUMBAR EPIDURAL STEROID INJECTION  9/1/2010     IR LUMBAR EPIDURAL STEROID INJECTION  3/10/2011     IR LUMBAR EPIDURAL STEROID INJECTION  8/30/2011     LAPAROSCOPY DIAGNOSTIC (GENERAL) N/A 10/13/2020    Procedure: Diagnostic laparoscopy with peritoneal washings  **Latex Allergy**;  Surgeon: Yandel Mallory MD;  Location: [facility] OR     OPEN REDUCTION INTERNAL FIXATION RODDING INTRAMEDULLARY FEMUR Left 12/23/2014    Procedure: OPEN REDUCTION INTERNAL FIXATION RODDING INTRAMEDULLARY FEMUR;  Surgeon: Arnol Santiago MD;  Location: [facility] OR     OPEN REDUCTION INTERNAL FIXATION WRIST Left  9/22/2022    Procedure: LEFT WRIST OPEN REDUCTION INTERNAL FIXATION, FRACTURE;  Surgeon: Mark Villalobos MD;  Location: [facility] OR     ORTHOPEDIC SURGERY       PICC DOUBLE LUMEN PLACEMENT Right 02/07/2020    5 fr double lumen 41 cm     NJ LAMINEC/FACETECT/FORAMIN,LUMBAR 1 SEG      Description: Laminectomy Decompress, Facetectomy, Foraminotomy Lumbar Seg;  Recorded: 04/12/2012;     REMOVE HARDWARE LOWER EXTREMITY Left 4/7/2015    Procedure: REMOVE HARDWARE LOWER EXTREMITY;  Surgeon: Arnol Santiago MD;  [facility] OR     REMOVE HARDWARE RODDING INTRAMEDULLARY FEMUR Left 12/17/2015    Procedure: REMOVE HARDWARE RODDING INTRAMEDULLARY FEMUR;  Surgeon: Arnol Santiago MD;  [facility]     RESECT BONE LOWER EXTREMITY Left 12/23/2014    Procedure: RESECT BONE LOWER EXTREMITY;  Surgeon: Arnol Santiago MD;  [facility] OR     SHORTENING OSTEOPLASTY FEMUR W/ IM NAILING       WHIPPLE PROCEDURE N/A 1/28/2020    Procedure: Open Whipple procedure and Cholecystectomy;  Surgeon: Yandel Mallory MD;  Location: [facility] OR     WHIPPLE PROCEDURE       ZZC FUSION OF SACROILIAC JOINT      Description: Arthrodesis Sacroiliac Joint Left;  Recorded: 10/07/2011;     ZZC REPAIR DETACH RETINA,SCLERAL BUCKLE       amitriptyline (ELAVIL) 75 MG tablet  citalopram (CELEXA) 10 MG tablet  citalopram (CELEXA) 20 MG tablet  COMPOUNDED NON-CONTROLLED SUBSTANCE (CMPD RX) - PHARMACY TO MIX COMPOUNDED MEDICATION  Cyanocobalamin 1000 MCG CAPS  cyclobenzaprine (FLEXERIL) 10 MG tablet  DULoxetine (CYMBALTA) 30 MG capsule  famotidine (PEPCID) 20 MG tablet  fluticasone (FLONASE) 50 MCG/ACT nasal spray  hydrOXYzine (VISTARIL) 25 MG capsule  loperamide (IMODIUM) 2 MG capsule  loratadine-pseudoePHEDrine (CLARITIN-D 24-HOUR)  MG 24 hr tablet  ondansetron (ZOFRAN) 8 MG tablet  oxyCODONE (ROXICODONE) 5 MG tablet  pantoprazole (PROTONIX) 40 MG EC tablet  sennosides (SENOKOT) 8.6 MG tablet  simethicone 80 MG TABS  SUMAtriptan (IMITREX) 100 MG  tablet      Allergies   Allergen Reactions     Gluten Meal Palpitations     Amoxicillin-Pot Clavulanate Rash     Patient tolerated Zosyn in 7/2020     Oxycontin [Oxycodone] Other (See Comments)     Confusion, loopy, oxycodone is tolerated.    Pt states she tolerates regular oxycodone, cannot take 12 hour oxycontin.       Pregabalin      interfered with Cymbalta     Topiramate      Tongue numbness, taste alteration, irritable      Acetaminophen Nausea     Urine retention       Dust Mite Extract      Gabapentin Dizziness and GI Disturbance     Ketorolac Headache     Latex Rash     Methadone Fatigue     Mold      Naprosyn [Naproxen] Dizziness and GI Disturbance     Seasonal Allergies      Family History  Family History   Problem Relation Age of Onset     Heart Failure Mother 77     Dementia Father         frontal lobe     No Known Problems Sister      No Known Problems Brother      Anesthesia Reaction No family hx of      Deep Vein Thrombosis No family hx of      Social History   Social History     Tobacco Use     Smoking status: Former     Packs/day: 0.50     Years: 25.00     Pack years: 12.50     Types: Cigarettes     Quit date: 1/1/2020     Years since quitting: 3.4     Smokeless tobacco: Never   Substance Use Topics     Alcohol use: No     Drug use: No         A medically appropriate review of systems was performed with pertinent positives and negatives noted in the HPI, and all other systems negative.    Physical Exam   BP: (!) 89/57 (pt states this is her baseline)  Pulse: 117  Temp: 99.1  F (37.3  C)  Resp: 20  SpO2: 95 %  Physical Exam  Physical Exam   Constitutional: oriented to person, place, and time. appears well-developed and well-nourished.   HENT:   Head: Normocephalic and atraumatic. No TTP over the temples.  Neck: Normal range of motion.   Pulmonary/Chest: Effort normal. No respiratory distress.   Cardiac: No murmurs, rubs, gallops. RRR.  Abdominal: Abdomen soft, nontender, nondistended. No rebound  tenderness.  MSK: Long bones without deformity or evidence of trauma  Neurological: alert and oriented to person, place, and time. Stable gait.  Finger-nose-finger normal bilaterally.  Sensation intact in all extremities.  Cranial nerves II through XII normal.  Pupils 3 mm bilaterally.  , bicep, tricep and lower leg strength 5 out of 5.  No nuchal rigidity.  No tenderness over the temporal areas.  Skin: Skin is warm and dry.   Psychiatric:  normal mood and affect.  behavior is normal. Thought content normal.       ED Course, Procedures, & Data      Procedures            Results for orders placed or performed during the hospital encounter of 05/28/23   Comprehensive metabolic panel     Status: Abnormal   Result Value Ref Range    Sodium 135 (L) 136 - 145 mmol/L    Potassium 4.1 3.4 - 5.3 mmol/L    Chloride 102 98 - 107 mmol/L    Carbon Dioxide (CO2) 22 22 - 29 mmol/L    Anion Gap 11 7 - 15 mmol/L    Urea Nitrogen 13.0 6.0 - 20.0 mg/dL    Creatinine 0.92 0.51 - 0.95 mg/dL    Calcium 7.9 (L) 8.6 - 10.0 mg/dL    Glucose 136 (H) 70 - 99 mg/dL    Alkaline Phosphatase 99 35 - 104 U/L    AST 22 10 - 35 U/L    ALT 20 10 - 35 U/L    Protein Total 5.9 (L) 6.4 - 8.3 g/dL    Albumin 3.5 3.5 - 5.2 g/dL    Bilirubin Total 0.4 <=1.2 mg/dL    GFR Estimate 73 >60 mL/min/1.73m2   CBC with platelets and differential     Status: Abnormal   Result Value Ref Range    WBC Count 8.3 4.0 - 11.0 10e3/uL    RBC Count 3.94 3.80 - 5.20 10e6/uL    Hemoglobin 11.7 11.7 - 15.7 g/dL    Hematocrit 37.6 35.0 - 47.0 %    MCV 95 78 - 100 fL    MCH 29.7 26.5 - 33.0 pg    MCHC 31.1 (L) 31.5 - 36.5 g/dL    RDW 13.6 10.0 - 15.0 %    Platelet Count 170 150 - 450 10e3/uL    % Neutrophils 90 %    % Lymphocytes 4 %    % Monocytes 5 %    % Eosinophils 1 %    % Basophils 0 %    % Immature Granulocytes 0 %    NRBCs per 100 WBC 0 <1 /100    Absolute Neutrophils 7.4 1.6 - 8.3 10e3/uL    Absolute Lymphocytes 0.3 (L) 0.8 - 5.3 10e3/uL    Absolute Monocytes 0.4 0.0  - 1.3 10e3/uL    Absolute Eosinophils 0.1 0.0 - 0.7 10e3/uL    Absolute Basophils 0.0 0.0 - 0.2 10e3/uL    Absolute Immature Granulocytes 0.0 <=0.4 10e3/uL    Absolute NRBCs 0.0 10e3/uL   CBC with platelets differential     Status: Abnormal    Narrative    The following orders were created for panel order CBC with platelets differential.  Procedure                               Abnormality         Status                     ---------                               -----------         ------                     CBC with platelets and d...[[id number]]  Abnormal            Final result                 Please view results for these tests on the individual orders.     Medications - No data to display  Labs Ordered and Resulted from Time of ED Arrival to Time of ED Departure - No data to display  No orders to display          Critical care was not performed.     Medical Decision Making  The patient's presentation was of moderate complexity (a chronic illness mild to moderate exacerbation, progression, or side effect of treatment).    The patient's evaluation involved:  ordering and/or review of 3+ test(s) in this encounter (see separate area of note for details)    The patient's management necessitated high risk (a parenteral controlled substance).      Assessment & Plan    MDM   patient presenting with headache.  This is a typical headache of her migraine.  She has a normal neurologic exam without suggestion of meningitis or encephalitis.  White blood count is unremarkable.  She has no trauma or suggestion of intracranial hemorrhage, will defer CT at this point.  Initially had slightly low blood pressure, review of chart it does appear that her systolic is often down in the 90s.  She looks quite well and otherwise stable.  Had mild tachycardia as well which resolved with fluids.  She is given medications here, symptoms nearly resolved.  She did request medications prior to discharge so she is given Reglan and Benadryl.   Discussed further treatment here in the emergency department but she would rather go home and try to sleep as it is improving.  She has medications at home including Imitrex and oxycodone.  Discussed return precautions and she will return if she has any further concerns.    I have reviewed the nursing notes. I have reviewed the findings, diagnosis, plan and need for follow up with the patient.    New Prescriptions    No medications on file       Final diagnoses:   Nonintractable headache, unspecified chronicity pattern, unspecified headache type     Scribe Disclosure:   I, SERGIO HERNANDEZ, am serving as a scribe; to document services personally performed by No name on file -based on data collection and the provider's statements to me.     Provider Disclosure:  I agree with above History, Review of Systems, Physical exam and Plan.  I have reviewed the content of the documentation and have edited it as needed. I have personally performed the services documented here and the documentation accurately represents those services and the decisions I have made.      Electronically signed by:      Antonino Barraza  [facility] EMERGENCY DEPARTMENT  5/27/2023     Antonino Barraza MD  05/28/23 0229

## 2023-06-02 ENCOUNTER — HEALTH MAINTENANCE LETTER (OUTPATIENT)
Age: 58
End: 2023-06-02

## 2023-06-09 DIAGNOSIS — F33.41 MDD (MAJOR DEPRESSIVE DISORDER), RECURRENT, IN PARTIAL REMISSION (H): ICD-10-CM

## 2023-06-09 RX ORDER — CITALOPRAM HYDROBROMIDE 20 MG/1
TABLET ORAL
Qty: 30 TABLET | Refills: 2 | Status: SHIPPED | OUTPATIENT
Start: 2023-06-09 | End: 2023-08-16

## 2023-06-09 NOTE — TELEPHONE ENCOUNTER
Received electronic communication from pharmacy requesting a refill of celexa.      Last office visit: 5/17/23  Scheduled for follow up 8/16/23     Will route request to MD for review.

## 2023-06-29 ENCOUNTER — ONCOLOGY VISIT (OUTPATIENT)
Dept: ONCOLOGY | Facility: CLINIC | Age: 58
End: 2023-06-29
Attending: STUDENT IN AN ORGANIZED HEALTH CARE EDUCATION/TRAINING PROGRAM
Payer: MEDICARE

## 2023-06-29 VITALS
BODY MASS INDEX: 26.21 KG/M2 | OXYGEN SATURATION: 99 % | WEIGHT: 177.5 LBS | RESPIRATION RATE: 16 BRPM | DIASTOLIC BLOOD PRESSURE: 73 MMHG | TEMPERATURE: 98.2 F | SYSTOLIC BLOOD PRESSURE: 117 MMHG | HEART RATE: 86 BPM

## 2023-06-29 DIAGNOSIS — T45.1X5A PERIPHERAL NEUROPATHY DUE TO CHEMOTHERAPY (H): ICD-10-CM

## 2023-06-29 DIAGNOSIS — M62.838 MUSCLE SPASM: ICD-10-CM

## 2023-06-29 DIAGNOSIS — R26.89 BALANCE PROBLEMS: ICD-10-CM

## 2023-06-29 DIAGNOSIS — G62.0 PERIPHERAL NEUROPATHY DUE TO CHEMOTHERAPY (H): ICD-10-CM

## 2023-06-29 DIAGNOSIS — C16.3 MALIGNANT NEOPLASM OF PYLORIC ANTRUM (H): Primary | ICD-10-CM

## 2023-06-29 PROCEDURE — 99215 OFFICE O/P EST HI 40 MIN: CPT | Performed by: STUDENT IN AN ORGANIZED HEALTH CARE EDUCATION/TRAINING PROGRAM

## 2023-06-29 PROCEDURE — G0463 HOSPITAL OUTPT CLINIC VISIT: HCPCS | Performed by: STUDENT IN AN ORGANIZED HEALTH CARE EDUCATION/TRAINING PROGRAM

## 2023-06-29 ASSESSMENT — PAIN SCALES - GENERAL: PAINLEVEL: SEVERE PAIN (7)

## 2023-06-29 NOTE — LETTER
6/29/2023         RE: Nathalie Bashir  [address] [facility] [address]        Dear Colleague,    Thank you for referring your patient, Nathalie Bashir, to the [facility]. Please see a copy of my visit note below.    [facility]   PM&R clinic note        Interval history:     Nathalie Bashir presents to clinic today for follow up reg her rehab needs.   She has h/o gastric adenocarcinoma (diagnosed during whipple in 1/2020 for presumed IPMN, no pancreatic malignancy found).  Was last seen in clinic on 3/30/23.  Recommendations included:  Work-up:  LE EMG results were reviewed with patient today. 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   Bilateral hand XRs were ordered to further evaluate notable joint deformities on exam/arthritic changes.  Therapy/equipment/braces:  Wear compression stockings for lower extremity swelling. Wear compression when up and active daily and can be taken off at night for rest.  Medications:  Continue weaning oxycodone per Palliative Care instructions, follow up with Palliative team in May. Continue cymbalta for depression and neuropathy symptoms.  Referral / follow up with other providers:  Follow up with Rheumatology as planned.  Follow up: 3 months.          Symptoms,  Patient was seen today for a return visit.  Overall, she has had some ups and downs since her last visit.  More recently, she was in the ER for migraine headache after a year of not having the severity that warranted coming into the ED.  She was given Imitrex and Dilaudid which relieved her symptoms.  She has continued to need her rescue medication at times at home.  She also continues to struggle with her neuropathy symptoms, and some instability with transfers and ambulating.  She continues to use her single-point cane.  She does endorse some falls over the last couple  of months.  She complains of low back/tailbone pain that she has been having for several months, and feels like it is getting worse.  Her PCP had previously ordered an x-ray through [facility] which demonstrated facet arthritis in her lumbar spine, and she is curious about other options for management including potential injections.  Her leg swelling has overall been very stable, and actually significantly improved since her last visit.  She still has flareups of swelling from time to time.  She denies any other concerns at today's visit.      Therapies/HEP,  Not currently participating in outpatient therapies.  Continues to try to remain active with a regular home exercise program as tolerated.      Functionally,   No changes since her last visit.      Social history is unchanged.      Medications:  Current Outpatient Medications   Medication Sig Dispense Refill    amitriptyline (ELAVIL) 75 MG tablet Take 1 tablet (75 mg) by mouth At Bedtime 30 tablet 3    citalopram (CELEXA) 10 MG tablet Take 1 tablet by mouth daily for 7 days, then increase to 2 tablets daily. 51 tablet 1    citalopram (CELEXA) 20 MG tablet TAKE 1 TABLET BY MOUTH EVERY DAY 30 tablet 2    COMPOUNDED NON-CONTROLLED SUBSTANCE (CMPD RX) - PHARMACY TO MIX COMPOUNDED MEDICATION Estriol 1 mg/gram,place 1 gram vaginally three times per week,42.5 grams 1 each 3    Cyanocobalamin 1000 MCG CAPS Take 1 tablet by mouth every morning      cyclobenzaprine (FLEXERIL) 10 MG tablet TAKE 1 TABLET BY MOUTH 3 TIMES DAILY AS NEEDED FOR MUSCLE SPASMS 90 tablet 3    DULoxetine (CYMBALTA) 30 MG capsule Take 1 capsule (30 mg) by mouth daily 7 capsule 0    famotidine (PEPCID) 20 MG tablet TAKE 1 TABLET (20 MG) BY MOUTH 2 TIMES DAILY AS NEEDED 180 tablet 1    fluticasone (FLONASE) 50 MCG/ACT nasal spray Spray 1 spray into both nostrils daily at 2 pm      hydrOXYzine (VISTARIL) 25 MG capsule Take 25-50 mg by mouth 4 times daily as needed for itching or anxiety       loperamide (IMODIUM) 2 MG capsule Take 4 mg by mouth 4 times daily as needed for diarrhea      loratadine-pseudoePHEDrine (CLARITIN-D 24-HOUR)  MG 24 hr tablet Take 1 tablet by mouth daily as needed for allergies Usually takes in Spring/Fall      ondansetron (ZOFRAN) 8 MG tablet Take 1 tablet (8 mg) by mouth every 8 hours as needed (Nausea/Vomiting) 90 tablet 2    oxyCODONE (ROXICODONE) 5 MG tablet Take 1 tablet (5 mg) by mouth 3 times daily as needed for breakthrough pain or moderate to severe pain Maximum 3 pills/day. 90 tablet 0    pantoprazole (PROTONIX) 40 MG EC tablet TAKE 1 TABLET BY MOUTH TWICE A  tablet 1    sennosides (SENOKOT) 8.6 MG tablet 1-2 tablets 1-2 times daily as needed. 120 tablet 1    simethicone 80 MG TABS Take 1 tablet by mouth every 6 hours as needed (bloating, gas, belching) 90 tablet 3    SUMAtriptan (IMITREX) 100 MG tablet Take 100 mg by mouth at onset of headache for migraine                Physical Exam:   LMP 09/23/2014   Gen: NAD, pleasant and cooperative  HEENT: Normocephalic, atraumatic, extra-ocular movements appear intact  Pulm: non-labored breathing in room air  Ext: mild edema in BLE, significantly improved since her last visit., no tenderness in calves, bilateral hand deformities, L>R, especially at DIP joints, swelling, TTP  Neuro/MSK:   Orientation: Oriented to person, place, time, situation. Exhibits good insight into her condition and ongoing management/symptoms.  Motor: Moving bilateral upper and lower extremities actively against gravity with no difficulties.  Sensory: intact to light touch throughout all dermatomes in bilateral lower extremities.  Back exam: Tenderness to palpation over lower lumbar spine, no lumbar or thoracic paraspinal tenderness, no SI joint tenderness or tenderness over GT bilaterally.  Exacerbation with lumbar extension and left lateral rotation, no exacerbation of symptoms with right lateral rotation or lumbar flexion.  Negative seated  straight leg raise test.    Labs/Imaging:  Lab Results   Component Value Date    WBC 8.3 05/28/2023    HGB 11.7 05/28/2023    HCT 37.6 05/28/2023    MCV 95 05/28/2023     05/28/2023     Lab Results   Component Value Date     (L) 05/28/2023    POTASSIUM 4.1 05/28/2023    CHLORIDE 102 05/28/2023    CO2 22 05/28/2023     (H) 05/28/2023     Lab Results   Component Value Date    GFRESTIMATED 73 05/28/2023    GFRESTBLACK 81 05/21/2021     Lab Results   Component Value Date    AST 22 05/28/2023    ALT 20 05/28/2023    ALKPHOS 99 05/28/2023    BILITOTAL 0.4 05/28/2023     Lab Results   Component Value Date    INR 1.21 (H) 12/07/2020     Lab Results   Component Value Date    BUN 13.0 05/28/2023    CR 0.92 05/28/2023              Assessment/Plan   Nathalie Bashir presents to clinic today for follow up reg her rehab needs.   She has h/o gastric adenocarcinoma (diagnosed during whipple in 1/2020 for presumed IPMN, no pancreatic malignancy found). Was last seen in clinic on 3/30/23.  Multiple rehabilitation considerations were discussed with Dena at today's visit.  She is unsteady with transfers, and would recommend obtaining a quad cane for better stability with transfers and ambulation.  This was ordered for today.  In addition, her bilateral lower extremity swelling is well controlled, however she is in need of new compression garments so these were ordered today as well.  Her low back pain appears to be related to facet arthropathy seen on previous imaging, we will place a referral to Dr. Urbina in med spine clinic for further evaluation and management of this.  We will plan a return visit in 3 to 4 months.  Nathalie is in agreement with this plan.      Therapy/equipment/braces:  Continue home exercise regimen as tolerated.  Order for quad cane placed at today's visit.  Referral to orthotics to be fit for new compression stockings.  Medications:  Continue topicals for pain relief.  Referral / follow up  with other providers:  Referral placed to Dr. Urbina in med spine clinic for low back pain.  Follow up: 3 to 4 months.      Apurva Dobbs MD  Physical Medicine & Rehabilitation      50 minutes spent on the date of the encounter doing chart review, history and exam, documentation and further activities as noted above.

## 2023-06-29 NOTE — PATIENT INSTRUCTIONS
1.  An order for a quad cane was placed today to help with better stability with transfers and ambulation.  2.  An order for new compression stockings were placed for you today.  3.  A referral to medical spine clinic was made for your low back pain.  4.  Follow-up with Dr. Dobbs in 4 months for a return visit.

## 2023-06-29 NOTE — NURSING NOTE
"Oncology Rooming Note    June 29, 2023 11:24 AM   Nathalie Bashir is a 57 year old female who presents for:    Chief Complaint   Patient presents with     Oncology Clinic Visit     RTN for Follow Up      Initial Vitals: Blood Pressure 117/73   Pulse 86   Temperature 98.2  F (36.8  C) (Oral)   Respiration 16   Weight 80.5 kg (177 lb 8 oz)   Last Menstrual Period 09/23/2014   Oxygen Saturation 99%   Body Mass Index 26.21 kg/m   Estimated body mass index is 26.21 kg/m  as calculated from the following:    Height as of 5/25/23: 1.753 m (5' 9\").    Weight as of this encounter: 80.5 kg (177 lb 8 oz). Body surface area is 1.98 meters squared.  Severe Pain (7) Comment: Data Unavailable   Patient's last menstrual period was 09/23/2014.  Allergies reviewed: Yes  Medications reviewed: Yes    Medications: Medication refills not needed today.  Pharmacy name entered into [facility]:    [facility] - [place], [place] - [phone]  [facility] [address] [place], [place] - [address] [place] [id number] [place], [place] - [address] [facility] - [place], [place] - [address]    Clinical concerns: none       Jodie Bowles MA            "

## 2023-06-29 NOTE — PROGRESS NOTES
[facility]   PM&R clinic note        Interval history:     Nathalie Bashir presents to clinic today for follow up reg her rehab needs.   She has h/o gastric adenocarcinoma (diagnosed during whipple in 1/2020 for presumed IPMN, no pancreatic malignancy found).  Was last seen in clinic on 3/30/23.  Recommendations included:  1. Work-up:  1. LE EMG results were reviewed with patient today. 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   2. Bilateral hand XRs were ordered to further evaluate notable joint deformities on exam/arthritic changes.  2. Therapy/equipment/braces:  1. Wear compression stockings for lower extremity swelling. Wear compression when up and active daily and can be taken off at night for rest.  3. Medications:  1. Continue weaning oxycodone per Palliative Care instructions, follow up with Palliative team in May. Continue cymbalta for depression and neuropathy symptoms.  4. Referral / follow up with other providers:  1. Follow up with Rheumatology as planned.  5. Follow up: 3 months.          Symptoms,  Patient was seen today for a return visit.  Overall, she has had some ups and downs since her last visit.  More recently, she was in the ER for migraine headache after a year of not having the severity that warranted coming into the ED.  She was given Imitrex and Dilaudid which relieved her symptoms.  She has continued to need her rescue medication at times at home.  She also continues to struggle with her neuropathy symptoms, and some instability with transfers and ambulating.  She continues to use her single-point cane.  She does endorse some falls over the last couple of months.  She complains of low back/tailbone pain that she has been having for several months, and feels like it is getting worse.  Her PCP had previously ordered an x-ray through [facility] which demonstrated facet  arthritis in her lumbar spine, and she is curious about other options for management including potential injections.  Her leg swelling has overall been very stable, and actually significantly improved since her last visit.  She still has flareups of swelling from time to time.  She denies any other concerns at today's visit.      Therapies/HEP,  Not currently participating in outpatient therapies.  Continues to try to remain active with a regular home exercise program as tolerated.      Functionally,   No changes since her last visit.      Social history is unchanged.      Medications:  Current Outpatient Medications   Medication Sig Dispense Refill     amitriptyline (ELAVIL) 75 MG tablet Take 1 tablet (75 mg) by mouth At Bedtime 30 tablet 3     citalopram (CELEXA) 10 MG tablet Take 1 tablet by mouth daily for 7 days, then increase to 2 tablets daily. 51 tablet 1     citalopram (CELEXA) 20 MG tablet TAKE 1 TABLET BY MOUTH EVERY DAY 30 tablet 2     COMPOUNDED NON-CONTROLLED SUBSTANCE (CMPD RX) - PHARMACY TO MIX COMPOUNDED MEDICATION Estriol 1 mg/gram,place 1 gram vaginally three times per week,42.5 grams 1 each 3     Cyanocobalamin 1000 MCG CAPS Take 1 tablet by mouth every morning       cyclobenzaprine (FLEXERIL) 10 MG tablet TAKE 1 TABLET BY MOUTH 3 TIMES DAILY AS NEEDED FOR MUSCLE SPASMS 90 tablet 3     DULoxetine (CYMBALTA) 30 MG capsule Take 1 capsule (30 mg) by mouth daily 7 capsule 0     famotidine (PEPCID) 20 MG tablet TAKE 1 TABLET (20 MG) BY MOUTH 2 TIMES DAILY AS NEEDED 180 tablet 1     fluticasone (FLONASE) 50 MCG/ACT nasal spray Spray 1 spray into both nostrils daily at 2 pm       hydrOXYzine (VISTARIL) 25 MG capsule Take 25-50 mg by mouth 4 times daily as needed for itching or anxiety       loperamide (IMODIUM) 2 MG capsule Take 4 mg by mouth 4 times daily as needed for diarrhea       loratadine-pseudoePHEDrine (CLARITIN-D 24-HOUR)  MG 24 hr tablet Take 1 tablet by mouth daily as needed for  allergies Usually takes in Spring/Fall       ondansetron (ZOFRAN) 8 MG tablet Take 1 tablet (8 mg) by mouth every 8 hours as needed (Nausea/Vomiting) 90 tablet 2     oxyCODONE (ROXICODONE) 5 MG tablet Take 1 tablet (5 mg) by mouth 3 times daily as needed for breakthrough pain or moderate to severe pain Maximum 3 pills/day. 90 tablet 0     pantoprazole (PROTONIX) 40 MG EC tablet TAKE 1 TABLET BY MOUTH TWICE A  tablet 1     sennosides (SENOKOT) 8.6 MG tablet 1-2 tablets 1-2 times daily as needed. 120 tablet 1     simethicone 80 MG TABS Take 1 tablet by mouth every 6 hours as needed (bloating, gas, belching) 90 tablet 3     SUMAtriptan (IMITREX) 100 MG tablet Take 100 mg by mouth at onset of headache for migraine                Physical Exam:   LMP 09/23/2014   Gen: NAD, pleasant and cooperative  HEENT: Normocephalic, atraumatic, extra-ocular movements appear intact  Pulm: non-labored breathing in room air  Ext: mild edema in BLE, significantly improved since her last visit., no tenderness in calves, bilateral hand deformities, L>R, especially at DIP joints, swelling, TTP  Neuro/MSK:   Orientation: Oriented to person, place, time, situation. Exhibits good insight into her condition and ongoing management/symptoms.  Motor: Moving bilateral upper and lower extremities actively against gravity with no difficulties.  Sensory: intact to light touch throughout all dermatomes in bilateral lower extremities.  Back exam: Tenderness to palpation over lower lumbar spine, no lumbar or thoracic paraspinal tenderness, no SI joint tenderness or tenderness over GT bilaterally.  Exacerbation with lumbar extension and left lateral rotation, no exacerbation of symptoms with right lateral rotation or lumbar flexion.  Negative seated straight leg raise test.    Labs/Imaging:  Lab Results   Component Value Date    WBC 8.3 05/28/2023    HGB 11.7 05/28/2023    HCT 37.6 05/28/2023    MCV 95 05/28/2023     05/28/2023     Lab  Results   Component Value Date     (L) 05/28/2023    POTASSIUM 4.1 05/28/2023    CHLORIDE 102 05/28/2023    CO2 22 05/28/2023     (H) 05/28/2023     Lab Results   Component Value Date    GFRESTIMATED 73 05/28/2023    GFRESTBLACK 81 05/21/2021     Lab Results   Component Value Date    AST 22 05/28/2023    ALT 20 05/28/2023    ALKPHOS 99 05/28/2023    BILITOTAL 0.4 05/28/2023     Lab Results   Component Value Date    INR 1.21 (H) 12/07/2020     Lab Results   Component Value Date    BUN 13.0 05/28/2023    CR 0.92 05/28/2023              Assessment/Plan   Nathalie Bashir presents to clinic today for follow up reg her rehab needs.   She has h/o gastric adenocarcinoma (diagnosed during whipple in 1/2020 for presumed IPMN, no pancreatic malignancy found). Was last seen in clinic on 3/30/23.  Multiple rehabilitation considerations were discussed with Dena at today's visit.  She is unsteady with transfers, and would recommend obtaining a quad cane for better stability with transfers and ambulation.  This was ordered for today.  In addition, her bilateral lower extremity swelling is well controlled, however she is in need of new compression garments so these were ordered today as well.  Her low back pain appears to be related to facet arthropathy seen on previous imaging, we will place a referral to Dr. Urbina in [facility] spine clinic for further evaluation and management of this.  We will plan a return visit in 3 to 4 months.  Nathalie is in agreement with this plan.      1. Therapy/equipment/braces:  1. Continue home exercise regimen as tolerated.  2. Order for quad cane placed at today's visit.  3. Referral to orthotics to be fit for new compression stockings.  2. Medications:  1. Continue topicals for pain relief.  3. Referral / follow up with other providers:  1. Referral placed to Dr. Urbina in med spine clinic for low back pain.  4. Follow up: 3 to 4 months.      Apurva Dobbs MD  Physical Medicine &  Rehabilitation      50 minutes spent on the date of the encounter doing chart review, history and exam, documentation and further activities as noted above.

## 2023-07-14 ENCOUNTER — LAB (OUTPATIENT)
Dept: LAB | Facility: CLINIC | Age: 58
End: 2023-07-14
Payer: MEDICARE

## 2023-07-14 DIAGNOSIS — R10.13 ABDOMINAL PAIN, EPIGASTRIC: ICD-10-CM

## 2023-07-14 DIAGNOSIS — D49.0 IPMN (INTRADUCTAL PAPILLARY MUCINOUS NEOPLASM): ICD-10-CM

## 2023-07-14 DIAGNOSIS — C16.3 MALIGNANT NEOPLASM OF PYLORIC ANTRUM (H): ICD-10-CM

## 2023-07-14 DIAGNOSIS — D50.9 IRON DEFICIENCY ANEMIA, UNSPECIFIED IRON DEFICIENCY ANEMIA TYPE: ICD-10-CM

## 2023-07-14 DIAGNOSIS — C16.9 GASTRIC ADENOCARCINOMA (H): ICD-10-CM

## 2023-07-14 DIAGNOSIS — C16.8 MALIGNANT NEOPLASM OF OVERLAPPING SITES OF STOMACH (H): Primary | ICD-10-CM

## 2023-07-14 PROCEDURE — 250N000011 HC RX IP 250 OP 636: Mod: JZ | Performed by: INTERNAL MEDICINE

## 2023-07-14 RX ORDER — HEPARIN SODIUM (PORCINE) LOCK FLUSH IV SOLN 100 UNIT/ML 100 UNIT/ML
5 SOLUTION INTRAVENOUS
Status: DISCONTINUED | OUTPATIENT
Start: 2023-07-14 | End: 2023-07-20 | Stop reason: HOSPADM

## 2023-07-14 RX ORDER — HEPARIN SODIUM,PORCINE 10 UNIT/ML
5-20 VIAL (ML) INTRAVENOUS DAILY PRN
Status: CANCELLED | OUTPATIENT
Start: 2023-07-14

## 2023-07-14 RX ORDER — FAMOTIDINE 20 MG/1
20 TABLET, FILM COATED ORAL 2 TIMES DAILY PRN
Qty: 180 TABLET | Refills: 1 | Status: SHIPPED | OUTPATIENT
Start: 2023-07-14

## 2023-07-14 RX ORDER — HEPARIN SODIUM (PORCINE) LOCK FLUSH IV SOLN 100 UNIT/ML 100 UNIT/ML
5 SOLUTION INTRAVENOUS
Status: CANCELLED | OUTPATIENT
Start: 2023-07-14

## 2023-07-14 RX ORDER — HEPARIN SODIUM,PORCINE 10 UNIT/ML
5-20 VIAL (ML) INTRAVENOUS DAILY PRN
OUTPATIENT
Start: 2023-07-14

## 2023-07-14 RX ADMIN — Medication 5 ML: at 11:21

## 2023-07-14 NOTE — TELEPHONE ENCOUNTER
Received electronic communication from pharmacy requesting a refill of famotidine.    Last office visit: 5/17/23  Scheduled for follow up 8/16/23     Will route request to MD for review.

## 2023-07-14 NOTE — NURSING NOTE
"Chief Complaint   Patient presents with     Port Flush     Port accessed and flushed by rn in lab. Vital signs taken.     Port accessed with 20g 3/4\" gripper needle by RN in lab.  Port flushed with saline and heparin.  Port de-accessed.    Joann Andrews RN      "

## 2023-07-17 RX ORDER — PANTOPRAZOLE SODIUM 40 MG/1
TABLET, DELAYED RELEASE ORAL
Qty: 180 TABLET | Refills: 1 | Status: SHIPPED | OUTPATIENT
Start: 2023-07-17 | End: 2024-01-05

## 2023-07-24 ENCOUNTER — MYC REFILL (OUTPATIENT)
Dept: PALLIATIVE CARE | Facility: CLINIC | Age: 58
End: 2023-07-24
Payer: MEDICARE

## 2023-07-24 DIAGNOSIS — G89.3 CHRONIC PAIN DUE TO NEOPLASM: ICD-10-CM

## 2023-07-24 DIAGNOSIS — M79.2 NEUROPATHIC PAIN: ICD-10-CM

## 2023-07-24 RX ORDER — OXYCODONE HYDROCHLORIDE 5 MG/1
5 TABLET ORAL 3 TIMES DAILY PRN
Qty: 90 TABLET | Refills: 0 | Status: SHIPPED | OUTPATIENT
Start: 2023-07-27 | End: 2023-08-23

## 2023-07-24 NOTE — TELEPHONE ENCOUNTER
Received [employer] message from patient requesting refill of oxycodone.     Last refill: 6/29/23  Last office visit: 5/17/23  Scheduled for follow up 8/16/23     Will route request to MD for review.     Reviewed MN  Report.

## 2023-08-14 ENCOUNTER — TELEPHONE (OUTPATIENT)
Dept: UROLOGY | Facility: CLINIC | Age: 58
End: 2023-08-14
Payer: MEDICARE

## 2023-08-14 DIAGNOSIS — R30.0 DYSURIA: Primary | ICD-10-CM

## 2023-08-14 DIAGNOSIS — R39.89 SUSPECTED UTI: ICD-10-CM

## 2023-08-14 NOTE — TELEPHONE ENCOUNTER
[facility] Call Center    Phone Message    May a detailed message be left on voicemail: yes     Reason for Call: Symptoms or Concerns     If patient has red-flag symptoms, warm transfer to triage line    Current symptom or concern: pt is calling stating she has another UTI, painful urination, cramping, back pain, foul odor, please call pt, thank you     Symptoms have been present for:  2 day(s)    Has patient previously been seen for this? No      Are there any new or worsening symptoms? Yes: new and worsening     Action Taken: Message routed to:  Other: uro    Travel Screening: Not Applicable

## 2023-08-15 ENCOUNTER — LAB (OUTPATIENT)
Dept: LAB | Facility: HOSPITAL | Age: 58
End: 2023-08-15
Payer: MEDICARE

## 2023-08-15 DIAGNOSIS — R39.89 SUSPECTED UTI: ICD-10-CM

## 2023-08-15 DIAGNOSIS — R30.0 DYSURIA: ICD-10-CM

## 2023-08-15 LAB
ALBUMIN UR-MCNC: 30 MG/DL
APPEARANCE UR: CLEAR
BILIRUB UR QL STRIP: NEGATIVE
COLOR UR AUTO: YELLOW
GLUCOSE UR STRIP-MCNC: NEGATIVE MG/DL
HGB UR QL STRIP: ABNORMAL
KETONES UR STRIP-MCNC: NEGATIVE MG/DL
LEUKOCYTE ESTERASE UR QL STRIP: ABNORMAL
NITRATE UR QL: POSITIVE
PH UR STRIP: 6 [PH] (ref 5–7)
SP GR UR STRIP: 1.03 (ref 1–1.03)
UROBILINOGEN UR STRIP-MCNC: <2 MG/DL

## 2023-08-15 PROCEDURE — 81003 URINALYSIS AUTO W/O SCOPE: CPT | Mod: QW

## 2023-08-15 PROCEDURE — 87086 URINE CULTURE/COLONY COUNT: CPT

## 2023-08-15 NOTE — PROGRESS NOTES
"Palliative Care Progress Note    Patient Name: Nathalie Bashir  Primary Provider: Alban Yuan    Chief Complaint/Patient ID: 56 yo F with hx of gastric adenocarcinoma              - Found during Whipple 1/2020 for presumed IPMN - no pancreatic malignancy found, but had poorly differentiated adenoca.  Received neoadjuvant chemo (5FU, leucovorin, oxaliplatin, docetaxel) with curative-intent subtotal gastrectomy, David-en-Y gastrojejunostomy 10/2020.  Required stenting of the jejunum for stenosis causing pain, removed 2/8/21.      Chronic and chemo-induced neuropathy - intol of gabapentin, pregabalin. Duloxetine somewhat effective, on TID/QID oxycodone       Last Palliative care appointment: 5/17/23 with me. Discussed that will need to either taper off her oxycodone or trial butrans patch for what is now chronic, non-cancer related pain. Changed Cymbalta to Celexa.     Reviewed: Yes    Interim History:  Nathalie Bashir is a 57 year old female who is seen today for follow up with Palliative Care via billable video visit.      Family stress continues to be fairly high.  Her father-in-law's health has further declined, and he has now enrolled in hospice at home.  They expect him to live no longer than September.  Her mother-in-law has thankfully moved out of her home and is back now living with father-in-law, however she is still clearly having some cognitive concerns.    Nathalie was able to get away with her grandsons for a couple of days this summer, however she otherwise has not gone on a vacation as we discussed last visit.    She does think that the transition from Cymbalta to Celexa was very difficult and took some time to adjust to, however she feels much better now on the Celexa.  Currently taking 20 mg daily.  Wondering if this could be increased.  Did cite ongoing family stress as a contributing factor.  States she does sometimes have period where she will just \"need to cry\" and then feel better.  " The 1 year anniversary of her niece's death, and her niece's birthday, just occurred this summer, which did make her feel more emotional.    Treated for UTI in early July. Thinks she has another one now. In communication with urology.  Per chart review, looks like urine culture is still pending.    Pain continues to be an issue in her back and her feet.  Did put a new adapter on the end of her cane to help with balance.  Oxycodone use the same.  States that she finds the longer she stands, she now has to take longer rest breaks. Appt scheduled with Dr. Urbina in med spine clinic on 8/29 for assessment of low back pain.  She will also be seeing rheumatology September 5.     Social History:  Lives with her . Has a son and 3 grandchildren  Retired, worked in special education.  Social History     Tobacco Use    Smoking status: Former     Packs/day: 0.50     Years: 25.00     Pack years: 12.50     Types: Cigarettes     Quit date: 1/1/2020     Years since quitting: 3.6    Smokeless tobacco: Never   Substance Use Topics    Alcohol use: No    Drug use: No     Family History- Reviewed in Epic.    Allergies   Allergen Reactions    Gluten Meal Palpitations    Amoxicillin-Pot Clavulanate Rash     Patient tolerated Zosyn in 7/2020    Oxycontin [Oxycodone] Other (See Comments)     Confusion, loopy, oxycodone is tolerated.    Pt states she tolerates regular oxycodone, cannot take 12 hour oxycontin.      Pregabalin      interfered with Cymbalta    Topiramate      Tongue numbness, taste alteration, irritable     Acetaminophen Nausea     Urine retention      D[place] Extract     Gabapentin Dizziness and GI Disturbance    Ketorolac Headache    Latex Rash    Methadone Fatigue    Mold     Naprosyn [Naproxen] Dizziness and GI Disturbance    Seasonal Allergies      Advanced Care Planning: Currently working on healthcare directive but has not completed it.  States her  would be her HCA.    Medications- Reviewed in  Epic.    Past Medical History- Reviewed in [facility].    Past Surgical History- Reviewed in Epic.    Review of Systems:   ROS: 10 point ROS neg other than the symptoms noted above in the HPI.    Physical Exam:   Constitutional: Alert, pleasant, no apparent distress. Sitting up in chair.  Eyes: Sclera non-icteric, no eye discharge.  ENT: No nasal discharge. Ears grossly normal.  Respiratory: Unlabored respirations. Speaking in full sentences.  Musculoskeletal: Extremities appear normal- no gross deformities noted. No edema noted on upper body.   Skin: No suspicious lesions or rashes on visible skin.  Neurologic: Clear speech, no aphasia. No facial droop.  Psychiatric: Mentation appears normal, appropriate attention. Affect normal/bright. Does not appear anxious or depressed.    Key Data Reviewed:  LABS: 5/28/23- Cr 0.92, GFR 73, Albumin 3.5, LFTs wnl. WBC 8.3, Hgb 11.7, Plts 170.     UA 8/15/23- Per my review of results, likely c/w TUI given presence of Leuk est, nitrites, and blood. Ucx not back yet.    IMAGING: CT CAP 5/17/23- IMPRESSION:   No evidence of metastatic disease in the chest, abdomen, or pelvis.    Impression & Recommendations & Counseling:  Nathalie Bashir is a 57 year old female with history of gastric adenocarcinoma, s/p neoadjuvant chemotherapy and curative intent surgery 10/2020.  Is now on surveillance. Ongoing non cancer-related pain.     Pain - Likely multifactorial.  Neuropathic pain is a side effect from chemo, EMG without any evidence of lumbosacral radiculopathy.  She also seems to have some ongoing joint pains involving her ankles, knees, sacrum, and possibly her neck.  -Reinforced conversation from side of/17/23 that with chronic pain with no evidence of ongoing cancer, palliative care transitions pain management either to primary care or a pain specialist.  She has mostly been able to taper down oxycodone to 3 pills daily.  -We will continue oxycodone prescription the same for now and await  recommendations from PM&R and rheumatology.  -At follow-up visit in November, discussed that we will either decrease her oxycodone by 1 tablet daily or need to retry Butrans patch.  She is also free to look into additional options for pain management.    Mood - Doing better with transition from Cymbalta to Celexa. Still having some low periods and does not quite feel like herself, so would like to try increasing dose.  -Increase Celexa to 30 mg daily.    ACP - Has been trying to work on HCD, however it has been difficult for her.  She does know her  would be her HCA.  Encouraged her to work on her directive in small chunks, perhaps 2 or 3 questions at a time every day.  If she does it this way, she can get the directive done within 2 weeks.    Follow up: 3 months    Video-Visit Details  Video Start Time: 9:20AM  Video End Time: 9:47 AM    Originating Location (pt. Location): Home     Distant Location (provider location):  Offsite- Personal Home     Platform used for Video Visit: Miguel A     Total time spent on day of encounter is 38 mins, including reviewing record, review of above studies, above visit with patient, symptomatic discussion of symptoms as above, including medication adjustments/prescription management, and documentation.     Karla Millard,   Palliative Medicine   AMCOM ID [id number]    Some chart documentation performed using Dragon Voice recognition Software. Although reviewed after completion, some words and grammatical errors may remain.

## 2023-08-16 ENCOUNTER — VIRTUAL VISIT (OUTPATIENT)
Dept: ONCOLOGY | Facility: CLINIC | Age: 58
End: 2023-08-16
Attending: STUDENT IN AN ORGANIZED HEALTH CARE EDUCATION/TRAINING PROGRAM
Payer: MEDICARE

## 2023-08-16 VITALS — BODY MASS INDEX: 26.21 KG/M2 | HEIGHT: 69 IN

## 2023-08-16 DIAGNOSIS — M79.2 NEUROPATHIC PAIN: ICD-10-CM

## 2023-08-16 DIAGNOSIS — F33.41 MDD (MAJOR DEPRESSIVE DISORDER), RECURRENT, IN PARTIAL REMISSION (H): Primary | ICD-10-CM

## 2023-08-16 DIAGNOSIS — F43.9 STRESS: ICD-10-CM

## 2023-08-16 DIAGNOSIS — Z71.89 ADVANCED CARE PLANNING/COUNSELING DISCUSSION: ICD-10-CM

## 2023-08-16 DIAGNOSIS — M25.50 MULTIPLE JOINT PAIN: ICD-10-CM

## 2023-08-16 DIAGNOSIS — Z85.028 HISTORY OF GASTRIC CANCER: ICD-10-CM

## 2023-08-16 LAB — BACTERIA UR CULT: ABNORMAL

## 2023-08-16 PROCEDURE — 99214 OFFICE O/P EST MOD 30 MIN: CPT | Mod: VID | Performed by: STUDENT IN AN ORGANIZED HEALTH CARE EDUCATION/TRAINING PROGRAM

## 2023-08-16 RX ORDER — CITALOPRAM HYDROBROMIDE 20 MG/1
30 TABLET ORAL DAILY
Qty: 135 TABLET | Refills: 0 | Status: SHIPPED | OUTPATIENT
Start: 2023-08-16 | End: 2023-11-08

## 2023-08-16 ASSESSMENT — PAIN SCALES - GENERAL: PAINLEVEL: MODERATE PAIN (4)

## 2023-08-16 NOTE — LETTER
8/16/2023         RE: Nathalie Bashir  [address] [facility] [address]        Dear Colleague,    Thank you for referring your patient, Nathalie Bashir, to the [facility] [place]. Please see a copy of my visit note below.    Palliative Care Progress Note    Patient Name: Nathalie Bashir  Primary Provider: Alban Yuan    Chief Complaint/Patient ID: 56 yo F with hx of gastric adenocarcinoma              - Found during [facility] 1/2020 for presumed IPMN - no pancreatic malignancy found, but had poorly differentiated adenoca.  Received neoadjuvant chemo (5FU, leucovorin, oxaliplatin, docetaxel) with curative-intent subtotal gastrectomy, David-en-Y gastrojejunostomy 10/2020.  Required stenting of the jejunum for stenosis causing pain, removed 2/8/21.      Chronic and chemo-induced neuropathy - intol of gabapentin, pregabalin. Duloxetine somewhat effective, on TID/QID oxycodone       Last Palliative care appointment: 5/17/23 with me. Discussed that will need to either taper off her oxycodone or trial butrans patch for what is now chronic, non-cancer related pain. Changed Cymbalta to Celexa.     Reviewed: Yes    Interim History:  Nathalie Bashir is a 57 year old female who is seen today for follow up with Palliative Care via billable video visit.      Family stress continues to be fairly high.  Her father-in-law's health has further declined, and he has now enrolled in hospice at home.  They expect him to live no longer than September.  Her mother-in-law has thankfully moved out of her home and is back now living with father-in-law, however she is still clearly having some cognitive concerns.    Nathalie was able to get away with her grandsons for a couple of days this summer, however she otherwise has not gone on a vacation as we discussed last visit.    She does think that the transition from Cymbalta to Celexa was very difficult and took some time to adjust to, however she  "feels much better now on the Celexa.  Currently taking 20 mg daily.  Wondering if this could be increased.  Did cite ongoing family stress as a contributing factor.  States she does sometimes have period where she will just \"need to cry\" and then feel better.  The 1 year anniversary of her niece's death, and her niece's birthday, just occurred this summer, which did make her feel more emotional.    Treated for UTI in early July. Thinks she has another one now. In communication with urology.  Per chart review, looks like urine culture is still pending.    Pain continues to be an issue in her back and her feet.  Did put a new adapter on the end of her cane to help with balance.  Oxycodone use the same.  States that she finds the longer she stands, she now has to take longer rest breaks. Appt scheduled with Dr. Urbina in med spine clinic on 8/29 for assessment of low back pain.  She will also be seeing rheumatology September 5.     Social History:  Lives with her . Has a son and 3 grandchildren  Retired, worked in special education.  Social History     Tobacco Use     Smoking status: Former     Packs/day: 0.50     Years: 25.00     Pack years: 12.50     Types: Cigarettes     Quit date: 1/1/2020     Years since quitting: 3.6     Smokeless tobacco: Never   Substance Use Topics     Alcohol use: No     Drug use: No     Family History- Reviewed in Epic.    Allergies   Allergen Reactions     Gluten Meal Palpitations     Amoxicillin-Pot Clavulanate Rash     Patient tolerated Zosyn in 7/2020     Oxycontin [Oxycodone] Other (See Comments)     Confusion, loopy, oxycodone is tolerated.    Pt states she tolerates regular oxycodone, cannot take 12 hour oxycontin.       Pregabalin      interfered with Cymbalta     Topiramate      Tongue numbness, taste alteration, irritable      Acetaminophen Nausea     Urine retention       Dust Mite Extract      Gabapentin Dizziness and GI Disturbance     Ketorolac Headache     Latex Rash "     Methadone Fatigue     Mold      Naprosyn [Naproxen] Dizziness and GI Disturbance     Seasonal Allergies      Advanced Care Planning: Currently working on healthcare directive but has not completed it.  States her  would be her HCA.    Medications- Reviewed in Epic.    Past Medical History- Reviewed in [facility].    Past Surgical History- Reviewed in Epic.    Review of Systems:   ROS: 10 point ROS neg other than the symptoms noted above in the HPI.    Physical Exam:   Constitutional: Alert, pleasant, no apparent distress. Sitting up in chair.  Eyes: Sclera non-icteric, no eye discharge.  ENT: No nasal discharge. Ears grossly normal.  Respiratory: Unlabored respirations. Speaking in full sentences.  Musculoskeletal: Extremities appear normal- no gross deformities noted. No edema noted on upper body.   Skin: No suspicious lesions or rashes on visible skin.  Neurologic: Clear speech, no aphasia. No facial droop.  Psychiatric: Mentation appears normal, appropriate attention. Affect normal/bright. Does not appear anxious or depressed.    Key Data Reviewed:  LABS: 5/28/23- Cr 0.92, GFR 73, Albumin 3.5, LFTs wnl. WBC 8.3, Hgb 11.7, Plts 170.     UA 8/15/23- Per my review of results, likely c/w TUI given presence of Leuk est, nitrites, and blood. Ucx not back yet.    IMAGING: CT CAP 5/17/23- IMPRESSION:   No evidence of metastatic disease in the chest, abdomen, or pelvis.    Impression & Recommendations & Counseling:  Nathalie Bashir is a 57 year old female with history of gastric adenocarcinoma, s/p neoadjuvant chemotherapy and curative intent surgery 10/2020.  Is now on surveillance. Ongoing non cancer-related pain.     Pain - Likely multifactorial.  Neuropathic pain is a side effect from chemo, EMG without any evidence of lumbosacral radiculopathy.  She also seems to have some ongoing joint pains involving her ankles, knees, sacrum, and possibly her neck.  -Reinforced conversation from side of/17/23 that with  chronic pain with no evidence of ongoing cancer, palliative care transitions pain management either to primary care or a pain specialist.  She has mostly been able to taper down oxycodone to 3 pills daily.  -We will continue oxycodone prescription the same for now and await recommendations from PM&R and rheumatology.  -At follow-up visit in November, discussed that we will either decrease her oxycodone by 1 tablet daily or need to retry Butrans patch.  She is also free to look into additional options for pain management.    Mood - Doing better with transition from Cymbalta to Celexa. Still having some low periods and does not quite feel like herself, so would like to try increasing dose.  -Increase Celexa to 30 mg daily.    ACP - Has been trying to work on HCD, however it has been difficult for her.  She does know her  would be her HCA.  Encouraged her to work on her directive in small chunks, perhaps 2 or 3 questions at a time every day.  If she does it this way, she can get the directive done within 2 weeks.    Follow up: 3 months    Video-Visit Details  Video Start Time: 9:20AM  Video End Time: 9:47 AM    Originating Location (pt. Location): Home     Distant Location (provider location):  Offsite- Personal Home     Platform used for Video Visit: [employer]     Total time spent on day of encounter is 38 mins, including reviewing record, review of above studies, above visit with patient, symptomatic discussion of symptoms as above, including medication adjustments/prescription management, and documentation.     Karla Millard DO  Palliative Medicine   AMCOM ID [id number]    Some chart documentation performed using Dragon Voice recognition Software. Although reviewed after completion, some words and grammatical errors may remain.      Again, thank you for allowing me to participate in the care of your patient.        Sincerely,        Karla Millard DO

## 2023-08-16 NOTE — NURSING NOTE
Is the patient currently in the state of [place]? YES    Visit mode:VIDEO    If the visit is dropped, the patient can be reconnected by: VIDEO VISIT: Text to cell phone: [phone]    Will anyone else be joining the visit? NO      How would you like to obtain your AVS? MyChart    Are changes needed to the allergy or medication list? NO    Reason for visit: SKY Spivey, Virtual Facilitator

## 2023-08-16 NOTE — PATIENT INSTRUCTIONS
Recommendations:  -Increase citalopram to 1 and 1/2 tablets daily.  -We discussed working on your healthcare directive in small chunks, such as 2 questions at a time, and then walk away and do something more fine.  If you do this, you should be able to get it completed within a couple weeks.  -We again discussed that we will need to either continue to wean down your oxycodone or retry a Butrans patch in the future. We will see what comes of your visits with the spine clinic and Rheumatology and will follow up on plan at our next visit.  You are still welcome to look into other options for pain management, including your primary care office versus a different pain clinic.     Follow up: 3 months      Reasons to Call    If you are having worsening/uncontrolled symptoms we want you to call!    You or your other physicians make any changes to medications we have prescribed.  -Please call for refills 4-5 days before you will run out of medication.    Important Phone Numbers, including: Refills, scheduling, and general questions     Palliative Care RN: Astrid Santiago - [phone]  *After hours or on weekends. Will connect you with on call MD. [phone]

## 2023-08-16 NOTE — LETTER
8/16/2023         RE: Nathalie Bashir  [address] [facility] [address]        Dear Colleague,    Thank you for referring your patient, Nathalie Bashir, to the [facility] [place]. Please see a copy of my visit note below.    Palliative Care Progress Note    Patient Name: Nathalie Bashir  Primary Provider: Alban Yuan    Chief Complaint/Patient ID: 56 yo F with hx of gastric adenocarcinoma              - Found during [facility] 1/2020 for presumed IPMN - no pancreatic malignancy found, but had poorly differentiated adenoca.  Received neoadjuvant chemo (5FU, leucovorin, oxaliplatin, docetaxel) with curative-intent subtotal gastrectomy, David-en-Y gastrojejunostomy 10/2020.  Required stenting of the jejunum for stenosis causing pain, removed 2/8/21.      Chronic and chemo-induced neuropathy - intol of gabapentin, pregabalin. Duloxetine somewhat effective, on TID/QID oxycodone       Last Palliative care appointment: 5/17/23 with me. Discussed that will need to either taper off her oxycodone or trial butrans patch for what is now chronic, non-cancer related pain. Changed Cymbalta to Celexa.     Reviewed: Yes    Interim History:  Nathalie Bashir is a 57 year old female who is seen today for follow up with Palliative Care via billable video visit.      Family stress continues to be fairly high.  Her father-in-law's health has further declined, and he has now enrolled in hospice at home.  They expect him to live no longer than September.  Her mother-in-law has thankfully moved out of her home and is back now living with father-in-law, however she is still clearly having some cognitive concerns.    Nathalie was able to get away with her grandsons for a couple of days this summer, however she otherwise has not gone on a vacation as we discussed last visit.    She does think that the transition from Cymbalta to Celexa was very difficult and took some time to adjust to, however she  "feels much better now on the Celexa.  Currently taking 20 mg daily.  Wondering if this could be increased.  Did cite ongoing family stress as a contributing factor.  States she does sometimes have period where she will just \"need to cry\" and then feel better.  The 1 year anniversary of her niece's death, and her niece's birthday, just occurred this summer, which did make her feel more emotional.    Treated for UTI in early July. Thinks she has another one now. In communication with urology.  Per chart review, looks like urine culture is still pending.    Pain continues to be an issue in her back and her feet.  Did put a new adapter on the end of her cane to help with balance.  Oxycodone use the same.  States that she finds the longer she stands, she now has to take longer rest breaks. Appt scheduled with Dr. Urbina in med spine clinic on 8/29 for assessment of low back pain.  She will also be seeing rheumatology September 5.     Social History:  Lives with her . Has a son and 3 grandchildren  Retired, worked in special education.  Social History     Tobacco Use     Smoking status: Former     Packs/day: 0.50     Years: 25.00     Pack years: 12.50     Types: Cigarettes     Quit date: 1/1/2020     Years since quitting: 3.6     Smokeless tobacco: Never   Substance Use Topics     Alcohol use: No     Drug use: No     Family History- Reviewed in Epic.    Allergies   Allergen Reactions     Gluten Meal Palpitations     Amoxicillin-Pot Clavulanate Rash     Patient tolerated Zosyn in 7/2020     Oxycontin [Oxycodone] Other (See Comments)     Confusion, loopy, oxycodone is tolerated.    Pt states she tolerates regular oxycodone, cannot take 12 hour oxycontin.       Pregabalin      interfered with Cymbalta     Topiramate      Tongue numbness, taste alteration, irritable      Acetaminophen Nausea     Urine retention       Dust Mite Extract      Gabapentin Dizziness and GI Disturbance     Ketorolac Headache     Latex Rash "     Methadone Fatigue     Mold      Naprosyn [Naproxen] Dizziness and GI Disturbance     Seasonal Allergies      Advanced Care Planning: Currently working on healthcare directive but has not completed it.  States her  would be her HCA.    Medications- Reviewed in Epic.    Past Medical History- Reviewed in [facility].    Past Surgical History- Reviewed in Epic.    Review of Systems:   ROS: 10 point ROS neg other than the symptoms noted above in the HPI.    Physical Exam:   Constitutional: Alert, pleasant, no apparent distress. Sitting up in chair.  Eyes: Sclera non-icteric, no eye discharge.  ENT: No nasal discharge. Ears grossly normal.  Respiratory: Unlabored respirations. Speaking in full sentences.  Musculoskeletal: Extremities appear normal- no gross deformities noted. No edema noted on upper body.   Skin: No suspicious lesions or rashes on visible skin.  Neurologic: Clear speech, no aphasia. No facial droop.  Psychiatric: Mentation appears normal, appropriate attention. Affect normal/bright. Does not appear anxious or depressed.    Key Data Reviewed:  LABS: 5/28/23- Cr 0.92, GFR 73, Albumin 3.5, LFTs wnl. WBC 8.3, Hgb 11.7, Plts 170.     UA 8/15/23- Per my review of results, likely c/w TUI given presence of Leuk est, nitrites, and blood. Ucx not back yet.    IMAGING: CT CAP 5/17/23- IMPRESSION:   No evidence of metastatic disease in the chest, abdomen, or pelvis.    Impression & Recommendations & Counseling:  Nathalie Bashir is a 57 year old female with history of gastric adenocarcinoma, s/p neoadjuvant chemotherapy and curative intent surgery 10/2020.  Is now on surveillance. Ongoing non cancer-related pain.     Pain - Likely multifactorial.  Neuropathic pain is a side effect from chemo, EMG without any evidence of lumbosacral radiculopathy.  She also seems to have some ongoing joint pains involving her ankles, knees, sacrum, and possibly her neck.  -Reinforced conversation from side of/17/23 that with  chronic pain with no evidence of ongoing cancer, palliative care transitions pain management either to primary care or a pain specialist.  She has mostly been able to taper down oxycodone to 3 pills daily.  -We will continue oxycodone prescription the same for now and await recommendations from PM&R and rheumatology.  -At follow-up visit in November, discussed that we will either decrease her oxycodone by 1 tablet daily or need to retry Butrans patch.  She is also free to look into additional options for pain management.    Mood - Doing better with transition from Cymbalta to Celexa. Still having some low periods and does not quite feel like herself, so would like to try increasing dose.  -Increase Celexa to 30 mg daily.    ACP - Has been trying to work on HCD, however it has been difficult for her.  She does know her  would be her HCA.  Encouraged her to work on her directive in small chunks, perhaps 2 or 3 questions at a time every day.  If she does it this way, she can get the directive done within 2 weeks.    Follow up: 3 months    Video-Visit Details  Video Start Time: 9:20AM  Video End Time: 9:47 AM    Originating Location (pt. Location): Home     Distant Location (provider location):  Offsite- Personal Home     Platform used for Video Visit: [employer]     Total time spent on day of encounter is 38 mins, including reviewing record, review of above studies, above visit with patient, symptomatic discussion of symptoms as above, including medication adjustments/prescription management, and documentation.     Karla Millard DO  Palliative Medicine   AMCOM ID [id number]    Some chart documentation performed using Dragon Voice recognition Software. Although reviewed after completion, some words and grammatical errors may remain.      Again, thank you for allowing me to participate in the care of your patient.        Sincerely,        Karla Millard DO

## 2023-08-17 DIAGNOSIS — N39.0 UTI (URINARY TRACT INFECTION): Primary | ICD-10-CM

## 2023-08-17 RX ORDER — SULFAMETHOXAZOLE/TRIMETHOPRIM 800-160 MG
1 TABLET ORAL 2 TIMES DAILY
Qty: 10 TABLET | Refills: 0 | Status: SHIPPED | OUTPATIENT
Start: 2023-08-17 | End: 2023-11-03

## 2023-08-17 NOTE — TELEPHONE ENCOUNTER
[facility] Call Center    Phone Message    May a detailed message be left on voicemail: yes     Reason for Call: Other: Pt called and stated that she needs a medication for her UTI per [place], Pt requesting medication be sent to the following pharmacy and reach out to pt once order has been sent.     [facility] [address] [place] [address]       Action Taken: Message routed to:  [facility]    Travel Screening: Not Applicable

## 2023-08-23 DIAGNOSIS — M79.2 NEUROPATHIC PAIN: ICD-10-CM

## 2023-08-23 DIAGNOSIS — G89.3 CHRONIC PAIN DUE TO NEOPLASM: ICD-10-CM

## 2023-08-23 RX ORDER — OXYCODONE HYDROCHLORIDE 5 MG/1
5 TABLET ORAL 3 TIMES DAILY PRN
Qty: 90 TABLET | Refills: 0 | Status: SHIPPED | OUTPATIENT
Start: 2023-08-23 | End: 2023-09-19

## 2023-08-23 NOTE — TELEPHONE ENCOUNTER
Received voicemail from patient requesting refill of oxycodone.     Last refill: 7/27/23  Last office visit: 8/16/23  Scheduled for follow up 11/8/23     Will route request to MD for review.     Reviewed [place]  Report.

## 2023-08-25 ASSESSMENT — ENCOUNTER SYMPTOMS
EYE PAIN: 1
EYE REDNESS: 1
SORE THROAT: 1
NAIL CHANGES: 0
BACK PAIN: 1
BLOATING: 1
SKIN CHANGES: 0
NECK MASS: 0
NECK PAIN: 1
SINUS CONGESTION: 1
BLOOD IN STOOL: 0
EYE WATERING: 1
POOR WOUND HEALING: 0
SINUS PAIN: 1
DIARRHEA: 0
HEMATURIA: 0
STIFFNESS: 1
CONSTIPATION: 1
MUSCLE CRAMPS: 1
DYSURIA: 0
MUSCLE WEAKNESS: 1
MYALGIAS: 1
VOMITING: 0
NAUSEA: 1
RECTAL PAIN: 0
HOARSE VOICE: 1
TASTE DISTURBANCE: 0
FLANK PAIN: 0
JAUNDICE: 0
TROUBLE SWALLOWING: 0
EYE IRRITATION: 1
DIFFICULTY URINATING: 0
JOINT SWELLING: 0
ARTHRALGIAS: 1
DOUBLE VISION: 0
BOWEL INCONTINENCE: 0
SMELL DISTURBANCE: 0
HEARTBURN: 0
ABDOMINAL PAIN: 1

## 2023-08-29 ENCOUNTER — HOSPITAL ENCOUNTER (OUTPATIENT)
Dept: GENERAL RADIOLOGY | Facility: HOSPITAL | Age: 58
Discharge: HOME | End: 2023-08-29
Attending: PHYSICAL MEDICINE & REHABILITATION
Payer: MEDICARE

## 2023-08-29 ENCOUNTER — OFFICE VISIT (OUTPATIENT)
Dept: PHYSICAL MEDICINE AND REHAB | Facility: CLINIC | Age: 58
End: 2023-08-29
Attending: STUDENT IN AN ORGANIZED HEALTH CARE EDUCATION/TRAINING PROGRAM
Payer: MEDICARE

## 2023-08-29 VITALS
HEART RATE: 91 BPM | WEIGHT: 178 LBS | OXYGEN SATURATION: 98 % | HEIGHT: 69 IN | SYSTOLIC BLOOD PRESSURE: 108 MMHG | BODY MASS INDEX: 26.36 KG/M2 | DIASTOLIC BLOOD PRESSURE: 68 MMHG

## 2023-08-29 DIAGNOSIS — M47.816 LUMBAR SPONDYLOSIS: ICD-10-CM

## 2023-08-29 DIAGNOSIS — M53.3 SACROILIAC JOINT PAIN: ICD-10-CM

## 2023-08-29 DIAGNOSIS — M47.816 LUMBAR FACET ARTHROPATHY: ICD-10-CM

## 2023-08-29 DIAGNOSIS — M47.816 LUMBAR SPONDYLOSIS: Primary | ICD-10-CM

## 2023-08-29 DIAGNOSIS — T45.1X5A PERIPHERAL NEUROPATHY DUE TO CHEMOTHERAPY (H): ICD-10-CM

## 2023-08-29 DIAGNOSIS — C16.3 MALIGNANT NEOPLASM OF PYLORIC ANTRUM (H): ICD-10-CM

## 2023-08-29 DIAGNOSIS — M62.838 MUSCLE SPASM: ICD-10-CM

## 2023-08-29 DIAGNOSIS — R26.89 BALANCE PROBLEMS: ICD-10-CM

## 2023-08-29 DIAGNOSIS — G62.0 PERIPHERAL NEUROPATHY DUE TO CHEMOTHERAPY (H): ICD-10-CM

## 2023-08-29 PROCEDURE — 99215 OFFICE O/P EST HI 40 MIN: CPT | Mod: GC | Performed by: PHYSICAL MEDICINE & REHABILITATION

## 2023-08-29 PROCEDURE — 73522 X-RAY EXAM HIPS BI 3-4 VIEWS: CPT

## 2023-08-29 PROCEDURE — 72100 X-RAY EXAM L-S SPINE 2/3 VWS: CPT

## 2023-08-29 ASSESSMENT — ANXIETY QUESTIONNAIRES
4. TROUBLE RELAXING: SEVERAL DAYS
7. FEELING AFRAID AS IF SOMETHING AWFUL MIGHT HAPPEN: NOT AT ALL
IF YOU CHECKED OFF ANY PROBLEMS ON THIS QUESTIONNAIRE, HOW DIFFICULT HAVE THESE PROBLEMS MADE IT FOR YOU TO DO YOUR WORK, TAKE CARE OF THINGS AT HOME, OR GET ALONG WITH OTHER PEOPLE: SOMEWHAT DIFFICULT
6. BECOMING EASILY ANNOYED OR IRRITABLE: NOT AT ALL
3. WORRYING TOO MUCH ABOUT DIFFERENT THINGS: NOT AT ALL
2. NOT BEING ABLE TO STOP OR CONTROL WORRYING: NOT AT ALL
GAD7 TOTAL SCORE: 2
GAD7 TOTAL SCORE: 2
1. FEELING NERVOUS, ANXIOUS, OR ON EDGE: NOT AT ALL
5. BEING SO RESTLESS THAT IT IS HARD TO SIT STILL: SEVERAL DAYS

## 2023-08-29 ASSESSMENT — PAIN SCALES - GENERAL: PAINLEVEL: EXTREME PAIN (8)

## 2023-08-29 NOTE — LETTER
8/29/2023       RE: Nathalie Bashir  [address] [address]       Dear Colleague,    Thank you for referring your patient, Nathalie Bashir, to the [facility] at [facility]. Please see a copy of my visit note below.    Patient seen at the request of Dr Apurva Dobbs for an opinion and evaluation of chronic lower back pain.      HISTORY OF PRESENT ILLNESS:  Nathalie Bashir is a 57 year old female with hx of gastric adenocarcinoma who presents with a chief complaint of lower back pain.     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     Pain began in 2020 after chemotherapy, is not associated with trauma.The pain is localized to the bilateral lower back/upper buttock area; she denies numbness/tingling associated with her back pain but notes numbness in both of her legs due to the neuropathy noted above. Pain is reported as 6/10 at rest today and up to 8/10 at worst in the last week. Symptoms are worse with prolonged standing ; and improved with lying down. She does report pain-related sleep disturbance. She states her palliative care team recommended she see pain clinic to optimize her PO pain regimen, has not seen yet. She does daily exercises for her neck and daily stretching for her back.     Functional limitations: Lower back pain limits ability to walk, do all ADLs, affects her sleep. Ambulates with a 4 pt cane due to both back pain and neuropathy in her feet.     PRIOR INJURIES/TREATMENT:   Ice/Heat: Heat helps.   Brace: None  Physical Therapy: Has completed several courses of physical therapy in the past.       - Current Pain Medications -   Oxycodone 5 mg TID   Flexeril prn     - Prior/Trialed Pain Medications -   Cannot take ibuprofen due to hx of gastric  cancer.    Prior Procedures:  Date    Procedure   Improvement (%)  None recent     Prior Related Surgery:   2015 - Left JOSE    ~2010 - Hx of decompression L4-L5 laminectomy   2013 - Hx of L JOSE           Other (acupuncture, OMT, CMM, TENS, DME, etc.): Tried acupuncture-did not help.     Specialists Seen - (with most recent, available notes and clinic visits reviewed)   1. Cancer rehab - Dr Apurva Dobbs MD   2. Palliative care     IMAGING - reviewed   No recent MRI / XR of lumbar spine. Recent CT CAP imaging from 5/17/2023 reviewed-evidence of prior SI joint fusion on the left.     Review Of Systems:  I am responding to those symptoms which are directly relevant to the specific indication for my consultation. I recommend that the patient follow up with their primary or referring provider to pursue any other symptoms which may be of concern.       Medical History:  She  has a past medical history of Allergic rhinitis, Cancer (H), Chronic pain, Closed fracture of distal end of left radius, Depression, Gastric adenocarcinoma (H), Lumbago, Migraine, Opioid dependence (H), Other chronic pain, Sacroiliitis (H), and Trochanteric bursitis.     She  has a past surgical history that includes Eye surgery; cataract iol, rt/lt; REPAIR DETACH RETINA,SCLERAL BUCKLE; orthopedic surgery; Resect bone lower extremity (Left, 12/23/2014); Open reduction internal fixation rodding intramedullary femur (Left, 12/23/2014); Remove hardware lower extremity (Left, 4/7/2015); back surgery; Remove hardware rodding intramedullary femur (Left, 12/17/2015); Arthroplasty hip (Left, 2016); Whipple procedure (N/A, 1/28/2020); Cholecystectomy (N/A, 1/28/2020); PICC/Midline Placement (Right, 02/07/2020); Esophagoscopy, gastroscopy, duodenoscopy (EGD), combined (N/A, 3/3/2020); IR Chest Port Placement > 5 Yrs of Age (3/26/2020); Endoscopic retrograde cholangiopancreatogram (N/A, 7/27/2020); Laparoscopy diagnostic (general) (N/A, 10/13/2020); Gastrectomy  (N/A, 10/21/2020); Whipple procedure; Esophagoscopy, gastroscopy, duodenoscopy (EGD), combined (N/A, 12/7/2020); Esophagoscopy, gastroscopy, duodenoscopy (EGD), combined (N/A, 2/8/2021); IR Lumbar Epidural Steroid Injection (8/25/2009); IR Lumbar Epidural Steroid Injection (9/2/2009); IR Lumbar Epidural Steroid Injection (9/25/2009); IR Lumbar Epidural Steroid Injection (11/23/2009); IR Lumbar Epidural Steroid Injection (4/1/2010); IR Lumbar Epidural Steroid Injection (9/1/2010); IR Lumbar Epidural Steroid Injection (3/10/2011); IR Lumbar Epidural Steroid Injection (8/30/2011); FUSION OF SACROILIAC JOINT; Pr Laminec/Facetect/Foramin,Lumbar 1 Seg; Shortening Osteoplasty Femur W/ Im Nailing; and Open reduction internal fixation wrist (Left, 9/22/2022).    Family History  Her family history includes Dementia in her father; Heart Failure (age of onset: 77) in her mother; No Known Problems in her brother and sister.     Social History:  Work: not currently working  History of any legal related pain issues: none  Current living situation: lives at home with spouse  She  reports that she quit smoking about 3 years ago. Her smoking use included cigarettes. She has a 12.50 pack-year smoking history. She has never used smokeless tobacco. She reports that she does not drink alcohol and does not use drugs.     Current Medications:   She has a current medication list which includes the following prescription(s): amitriptyline, citalopram, compounded non-controlled substance, cyanocobalamin, cyclobenzaprine, famotidine, fluticasone, hydroxyzine, loperamide, loratadine-pseudoephedrine, ondansetron, oxycodone, pantoprazole, sennosides, simethicone, sulfamethoxazole-trimethoprim, and sumatriptan.     Allergies:    -- Gluten Meal -- Palpitations   -- Amoxicillin-Pot Clavulanate -- Rash    --  Patient tolerated Zosyn in 7/2020   -- Oxycontin [Oxycodone] -- Other (See Comments)    --  Confusion, loopy, oxycodone is tolerated.    Pt      "        states she tolerates regular oxycodone, cannot             take 12 hour oxycontin.   -- Pregabalin     --  interfered with Cymbalta   -- Topiramate     --  Tongue numbness, taste alteration, irritable   -- Acetaminophen -- Nausea    --  Urine retention   -- Dust Mite Extract    -- Gabapentin -- Dizziness and GI Disturbance   -- Ketorolac -- Headache   -- Latex -- Rash   -- Methadone -- Fatigue   -- Mold    -- Naprosyn [Naproxen] -- Dizziness and GI Disturbance   -- Seasonal Allergies     PHYSICAL EXAMINATION:  /68   Pulse 91   Ht 1.753 m (5' 9\")   Wt 80.7 kg (178 lb)   LMP 09/23/2014   SpO2 98%   BMI 26.29 kg/m     General: Pleasant, straightforward, WDWN individual.  Mental Status: Pleasant, direct, appropriate mood and affect  Resp: breathing is unlabored without audible wheeze  Vascular: No visible cyanosis, no venous stasis changes  Heme: No visible ecchymosis or erythema on extremities  Skin: No notable rash    Neurologic:  Strength: All major muscle groups of the bilateral lower extremities have normal and symmetric muscle strength     Sensation: abmnl sensation to light touch in lower extremities bilaterally L3-S1     DTRs: bilateral lower extremity stretch reflexes hyporeflexic (1+) due to hx of chemo induced neuropathy.   Plantar reflexes: equivocal due to neuropathy  Clonus: neg    Balance: did not assess due to hx of severe neuropathy.   Gait: ambulates with 4 pt cane.     Musculoskeletal:  Lumbar Spine: Mild-mod reduced  ROM and pain with end-range,  lumbosacral junction and facets tender to palpation, facet loading with pain jose, Str Leg Raise negative, hip provocative maneuvers negative.     SI joint maneuvers: TTP SIJ (sacral sulcus/PSIS), Dora (pt pointing) pos, Taj neg, Gaenslen pos, Yeoman's equivocal.         ASSESSMENT:  Nathalie Bashir is a pleasant 57 year old female who presents with chronic lower back pain since 2020. Discussed treatment options including " conservative management such as physical therapy, PO medications- can see pain clinic for PO pain management. Discussed imaging of the pelvis/SI joint to evaluate further as patient does have positive provocative testing on exam today for the SI joint. Patient has history of SI joint fusion on the L with symptoms worse on the L, on CT imaging unclear if patient has fusion of transverse process to sacrum on the L or if this is related to slice on CT imaging-  will obtain MRI lumbar spine to evaluate further.     Complicating co-morbidities include:   #.  Status post left total hip arthroplasty  #.  Status post left sacroiliac joint fusion  #.  H/o malignant neoplasm of pyloric antrum  #.  H/o chemotherapy-induced peripheral polyneuropathy    She has experienced incomplete/inadequate symptomatic relief from multiple pharmacologic trials, repeated courses of physical therapy, chiropractic care, acupuncture and surgery/surgical evaluation     PLAN:  -X-ray lumbar spine and pelvis/SI joint today  -MRI lumbar spine  -Offered additional physical therapy, however, she is completed multiple courses in the past with difficulty due to left hip and SI joint fusion surgeries typically increasing pain and symptoms.  Can consider comprehensive cancer rotation with PM&R clinic otherwise  -The pathway from diagnostic medial branch blocks to radiofrequency denervation was discussed in detail with the patient today.  Risks and benefits were discussed and all questions were answered.  The patient states understanding and wishes to proceed.  There are no contraindications.  The patient understands they will have 2 diagnostic medial branch blocks; and after each block they will be required to keep a pain diary recording their pain scores approximately every hour until the pain has returned to baseline.  During the time after the block, the patient was instructed to perform activities which typically aggravates their pain.  The patient  will contact the medical spine staff after each diagnostic block to assess the amount of benefit the patient received.  If the patient has a significantly positive response to each of these diagnostic blocks, they may move forward with the radiofrequency ablation procedure.   -Pending review of imaging, will otherwise plan for bilateral L4-5, L5-S1 medial branch block #1    Ready to learn, no apparent learning barriers.  Education provided on treatment plan according to patient's preferred learning style.  Patient verbalizes understanding.     Patient was staffed with attending physician, Dr. Urbina.   _________________________________  Dean Carrillo, DO  PGY-3 Resident   Physical Medicine & Rehabilitation      I was physically present for the E/M service provided. I agree with the House Officer note and plan, which I have reviewed and edited where appropriate.      40 minutes spent by me on the date of the encounter doing chart review, review of test results, patient visit, and documentation           Again, thank you for allowing me to participate in the care of your patient.      Sincerely,    Maria Victoria Urbina MD

## 2023-08-29 NOTE — PROGRESS NOTES
Patient seen at the request of Dr Apurva Dobbs for an opinion and evaluation of chronic lower back pain.      HISTORY OF PRESENT ILLNESS:  Nathalie Bashir is a 57 year old female with hx of gastric adenocarcinoma who presents with a chief complaint of lower back pain.     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     Pain began in 2020 after chemotherapy, is not associated with trauma.The pain is localized to the bilateral lower back/upper buttock area; she denies numbness/tingling associated with her back pain but notes numbness in both of her legs due to the neuropathy noted above. Pain is reported as 6/10 at rest today and up to 8/10 at worst in the last week. Symptoms are worse with prolonged standing ; and improved with lying down. She does report pain-related sleep disturbance. She states her palliative care team recommended she see pain clinic to optimize her PO pain regimen, has not seen yet. She does daily exercises for her neck and daily stretching for her back.     Functional limitations: Lower back pain limits ability to walk, do all ADLs, affects her sleep. Ambulates with a 4 pt cane due to both back pain and neuropathy in her feet.     PRIOR INJURIES/TREATMENT:   Ice/Heat: Heat helps.   Brace: None  Physical Therapy: Has completed several courses of physical therapy in the past.       - Current Pain Medications -   Oxycodone 5 mg TID   Flexeril prn     - Prior/Trialed Pain Medications -   Cannot take ibuprofen due to hx of gastric cancer.    Prior Procedures:  Date    Procedure   Improvement (%)  None recent     Prior Related Surgery:   2015 - Left JOSE    ~2010 - Hx of decompression L4-L5 laminectomy   2013 - Hx of L JOSE           Other (acupuncture, OMT, CMM, TENS, DME, etc.): Tried acupuncture-did not help.     Specialists Seen -  (with most recent, available notes and clinic visits reviewed)   1. Cancer rehab - Dr Apurva Dobbs MD   2. Palliative care     IMAGING - reviewed   No recent MRI / XR of lumbar spine. Recent CT CAP imaging from 5/17/2023 reviewed-evidence of prior SI joint fusion on the left.     Review Of Systems:  I am responding to those symptoms which are directly relevant to the specific indication for my consultation. I recommend that the patient follow up with their primary or referring provider to pursue any other symptoms which may be of concern.       Medical History:  She  has a past medical history of Allergic rhinitis, Cancer (H), Chronic pain, Closed fracture of distal end of left radius, Depression, Gastric adenocarcinoma (H), Lumbago, Migraine, Opioid dependence (H), Other chronic pain, Sacroiliitis (H), and Trochanteric bursitis.     She  has a past surgical history that includes Eye surgery; cataract iol, rt/lt; REPAIR DETACH RETINA,SCLERAL BUCKLE; orthopedic surgery; Resect bone lower extremity (Left, 12/23/2014); Open reduction internal fixation rodding intramedullary femur (Left, 12/23/2014); Remove hardware lower extremity (Left, 4/7/2015); back surgery; Remove hardware rodding intramedullary femur (Left, 12/17/2015); Arthroplasty hip (Left, 2016); Whipple procedure (N/A, 1/28/2020); Cholecystectomy (N/A, 1/28/2020); PICC/Midline Placement (Right, 02/07/2020); Esophagoscopy, gastroscopy, duodenoscopy (EGD), combined (N/A, 3/3/2020); IR Chest Port Placement > 5 Yrs of Age (3/26/2020); Endoscopic retrograde cholangiopancreatogram (N/A, 7/27/2020); Laparoscopy diagnostic (general) (N/A, 10/13/2020); Gastrectomy (N/A, 10/21/2020); Whipple procedure; Esophagoscopy, gastroscopy, duodenoscopy (EGD), combined (N/A, 12/7/2020); Esophagoscopy, gastroscopy, duodenoscopy (EGD), combined (N/A, 2/8/2021); IR Lumbar Epidural Steroid Injection (8/25/2009); IR Lumbar Epidural Steroid Injection (9/2/2009); IR Lumbar Epidural  Steroid Injection (9/25/2009); IR Lumbar Epidural Steroid Injection (11/23/2009); IR Lumbar Epidural Steroid Injection (4/1/2010); IR Lumbar Epidural Steroid Injection (9/1/2010); IR Lumbar Epidural Steroid Injection (3/10/2011); IR Lumbar Epidural Steroid Injection (8/30/2011); FUSION OF SACROILIAC JOINT; Pr Laminec/Facetect/Foramin,Lumbar 1 Seg; Shortening Osteoplasty Femur W/ Im Nailing; and Open reduction internal fixation wrist (Left, 9/22/2022).    Family History  Her family history includes Dementia in her father; Heart Failure (age of onset: 77) in her mother; No Known Problems in her brother and sister.     Social History:  Work: not currently working  History of any legal related pain issues: none  Current living situation: lives at home with spouse  She  reports that she quit smoking about 3 years ago. Her smoking use included cigarettes. She has a 12.50 pack-year smoking history. She has never used smokeless tobacco. She reports that she does not drink alcohol and does not use drugs.     Current Medications:   She has a current medication list which includes the following prescription(s): amitriptyline, citalopram, compounded non-controlled substance, cyanocobalamin, cyclobenzaprine, famotidine, fluticasone, hydroxyzine, loperamide, loratadine-pseudoephedrine, ondansetron, oxycodone, pantoprazole, sennosides, simethicone, sulfamethoxazole-trimethoprim, and sumatriptan.     Allergies:    -- Gluten Meal -- Palpitations   -- Amoxicillin-Pot Clavulanate -- Rash    --  Patient tolerated Zosyn in 7/2020   -- Oxycontin [Oxycodone] -- Other (See Comments)    --  Confusion, loopy, oxycodone is tolerated.    Pt             states she tolerates regular oxycodone, cannot             take 12 hour oxycontin.   -- Pregabalin     --  interfered with Cymbalta   -- Topiramate     --  Tongue numbness, taste alteration, irritable   -- Acetaminophen -- Nausea    --  Urine retention   -- Dust Mite Extract    -- Gabapentin --  "Dizziness and GI Disturbance   -- Ketorolac -- Headache   -- Latex -- Rash   -- Methadone -- Fatigue   -- Mold    -- Naprosyn [Naproxen] -- Dizziness and GI Disturbance   -- Seasonal Allergies     PHYSICAL EXAMINATION:  /68   Pulse 91   Ht 1.753 m (5' 9\")   Wt 80.7 kg (178 lb)   LMP 09/23/2014   SpO2 98%   BMI 26.29 kg/m     General: Pleasant, straightforward, WDWN individual.  Mental Status: Pleasant, direct, appropriate mood and affect  Resp: breathing is unlabored without audible wheeze  Vascular: No visible cyanosis, no venous stasis changes  Heme: No visible ecchymosis or erythema on extremities  Skin: No notable rash    Neurologic:  Strength: All major muscle groups of the bilateral lower extremities have normal and symmetric muscle strength     Sensation: abmnl sensation to light touch in lower extremities bilaterally L3-S1     DTRs: bilateral lower extremity stretch reflexes hyporeflexic (1+) due to hx of chemo induced neuropathy.   Plantar reflexes: equivocal due to neuropathy  Clonus: neg    Balance: did not assess due to hx of severe neuropathy.   Gait: ambulates with 4 pt cane.     Musculoskeletal:  Lumbar Spine: Mild-mod reduced  ROM and pain with end-range,  lumbosacral junction and facets tender to palpation, facet loading with pain jose, Str Leg Raise negative, hip provocative maneuvers negative.     SI joint maneuvers: TTP SIJ (sacral sulcus/PSIS), Dora (pt pointing) pos, Taj neg, Gaenslen pos, Yeoman's equivocal.         ASSESSMENT:  Nathalie Bashir is a pleasant 57 year old female who presents with chronic lower back pain since 2020. Discussed treatment options including conservative management such as physical therapy, PO medications- can see pain clinic for PO pain management. Discussed imaging of the pelvis/SI joint to evaluate further as patient does have positive provocative testing on exam today for the SI joint. Patient has history of SI joint fusion on the L with " symptoms worse on the L, on CT imaging unclear if patient has fusion of transverse process to sacrum on the L or if this is related to slice on CT imaging-  will obtain MRI lumbar spine to evaluate further.     Complicating co-morbidities include:   #.  Status post left total hip arthroplasty  #.  Status post left sacroiliac joint fusion  #.  H/o malignant neoplasm of pyloric antrum  #.  H/o chemotherapy-induced peripheral polyneuropathy    She has experienced incomplete/inadequate symptomatic relief from multiple pharmacologic trials, repeated courses of physical therapy, chiropractic care, acupuncture and surgery/surgical evaluation     PLAN:  -X-ray lumbar spine and pelvis/SI joint today  -MRI lumbar spine  -Offered additional physical therapy, however, she is completed multiple courses in the past with difficulty due to left hip and SI joint fusion surgeries typically increasing pain and symptoms.  Can consider comprehensive cancer rotation with PM&R clinic otherwise  -The pathway from diagnostic medial branch blocks to radiofrequency denervation was discussed in detail with the patient today.  Risks and benefits were discussed and all questions were answered.  The patient states understanding and wishes to proceed.  There are no contraindications.  The patient understands they will have 2 diagnostic medial branch blocks; and after each block they will be required to keep a pain diary recording their pain scores approximately every hour until the pain has returned to baseline.  During the time after the block, the patient was instructed to perform activities which typically aggravates their pain.  The patient will contact the medical spine staff after each diagnostic block to assess the amount of benefit the patient received.  If the patient has a significantly positive response to each of these diagnostic blocks, they may move forward with the radiofrequency ablation procedure.   -Pending review of imaging,  will otherwise plan for bilateral L4-5, L5-S1 medial branch block #1    Ready to learn, no apparent learning barriers.  Education provided on treatment plan according to patient's preferred learning style.  Patient verbalizes understanding.     Patient was staffed with attending physician, Dr. Urbina.   _________________________________  Dean Carrillo,   PGY-3 Resident   Physical Medicine & Rehabilitation      I was physically present for the E/M service provided. I agree with the House Officer note and plan, which I have reviewed and edited where appropriate.  ________________________________   Maria Victoria Urbina MD  Department of Physical Medicine & Rehabilitation       40 minutes spent by me on the date of the encounter doing chart review, review of test results, patient visit, and documentation

## 2023-08-29 NOTE — NURSING NOTE
"Nathalie Bashir is a 57 year old female who presents for:  Chief Complaint   Patient presents with    Consult     History of gastric adenocarcinoma and chronic low back pain, known facet arthropathy        Initial Vitals:  /68   Pulse 91   Ht 5' 9\" (1.753 m)   Wt 178 lb (80.7 kg)   LMP 09/23/2014   SpO2 98%   BMI 26.29 kg/m   Estimated body mass index is 26.29 kg/m  as calculated from the following:    Height as of this encounter: 5' 9\" (1.753 m).    Weight as of this encounter: 178 lb (80.7 kg).. Body surface area is 1.98 meters squared. BP completed using cuff size: regular  Extreme Pain (8)        Keely Gardner   "

## 2023-08-29 NOTE — PATIENT INSTRUCTIONS
It was a pleasure seeing you today in our [facility]. We discussed the following:    #. X-ray: Lumbar spine and Hips/Pelvis    An XR order was added today. You may schedule this at the discharge desk or radiology will call you to schedule. You may also call [facility] Scheduling directly if you do not hear from them in the next couple of days.    Imaging scheduling  -[facility] [phone] Clinics and Surgery Center [facility]), [facility] Clinics, including [facility] [phone] [facility], [facility] Clinics including [place], [place], [place], [place], and [facility] [phone] [facility], [facility], [facility], [facility], including [facility], and [facility] [phone] [facility], [facility], [facility], including [facility], and [place]      #. MRI    MRI Lumbar spine order was added today. You can schedule this at the check-out desk today or Radiology will call you to schedule. You may also call [facility] Scheduling directly if you do not hear from them in the next couple of days.    Imaging scheduling  -[facility] [phone] [facility] and [facility] Center [facility], including [facility] [phone] [facility], [facility] Clinics including [place], [place], [place], and [facility] [phone] [facility], [facility], [facility], [facility], including [facility], and [facility] [phone] [facility]  [facility] Clinics, including [facility], and [facility]. We discussed lumbar medial branch blocks and possible radiofrequency ablation.  Additional information regarding this procedure as outlined below.  We can consider this procedure after review of your MRI lumbar spine is complete.       Medial Branch Blocks and Radiofrequency Ablation (RFA) aka Neurotomy  Back or neck pain may be due to problems with certain nerves near your spine. If so, a medial branch neurotomy can help relieve your pain. Neurotomy destroys a nerve to relieve pain or stop involuntary movements. In some cases, your doctor may give you a nerve block to see how your body will respond to neurotomy. The treatment uses heat, cold, radiofrequency, or chemicals to destroy the nerves near a problem joint. This keeps some pain messages from traveling to your brain, and helps relieve your symptoms.   Medial branch nerves  Each vertebra in your spine has facets (flat surfaces). They touch where the vertebrae fit together. This forms a facet joint. Each facet joint has at least 2 medial branch nerves. They are part of the nerve pathway to and from each facet joint. A facet joint in your back or neck can become inflamed (swollen and irritated). Pain messages may then travel along the nerve pathway from the facet joint to your brain.     Blocking pain messages  Medial branch nerves in each facet joint send and carry messages about back or neck pain. Destroying a few of these nerves can keep certain pain messages from reaching your brain. This can help bring you relief. The relief typically lasts for months to years.   Risks and complications  Risks and complications are rare, but can include:  Infection  Increased pain, numbness, or weakness  Nerve damage  Bleeding  Failure to relieve pain  Madeleine last reviewed this educational content on 4/1/2018 2000-2021 [employer]. All rights reserved. This information is not  intended as a substitute for professional medical care. Always follow your healthcare professional's instructions.          Preparing for Spine Injection Therapy                 Why Do I Need Spine Injection Therapy?  Your Health Care Provider is recommending spine injection therapy to  help relieve your back and neck pain. This will be in addition to other therapies such as medications and physical therapy. The purpose of these injections is to reduce the amount of inflammation (swelling, pain, heat, redness, loss of body function) around the nerves thus reducing the amount of pain.      The medications you will receive with the injections will include:   Anesthetic - medication to numb the painful area.      To reduce your discomfort during the injection procedure, you will receive a numbing medication injection prior to the placement of the needles. You will be lying on your stomach during the injection procedure. We will use a low-dose x-ray (fluoroscopy) to help guide needle placement.  You must have a  for this procedure. We will need to reschedule your injection if you do not have a  with you.       What are the different types of injections and procedure?  Below, is a brief description of the different types of injections we use to deliver pain medication as close as possible to the nerves in the painful area:     Medial Branch Block injections: This is a test to see if your pain is      coming from a specific nerve. This injection is similar to a facet joint injection but contains only the numbing medication.  You will keep a pain score diary for the rest of the day and the following morning after receiving the injection.    Radiofrequency ablation: This is a procedure that uses radio waves and numbing medication to block the nerves that feel pain at the joint. The pain relief effects can last for a long time, but are not permanent. The procedure is similar to the Medial Branch Block but  requires additional testing to ensure that the needle is near the nerve before numbing and ablating it.  Doctors often order sedation for this procedure.  Please see the sections on sedation below.       What Should I Expect if I Receive Sedation? THIS IS ORDERED BY THE PHYSICIAN  This is conscious sedation.  The medications we give to you will help you relax and reduce your anxiety.  You will still be awake for the procedure so we can ask you questions and hear your answers. You will NOT receive sedation for the diagnostic test blocks in order to better help evaluate your response to the injection.      What Are My Responsibilities With Sedation?  You must stop eating and drinking 8 (eight) hours before your procedure. You can have clear fluids (water, coffee/tea without milk) up to 2 hours prior to the injections. Nothing by mouth for 2 hours prior to injection.  This includes gum, mints, or chew.  Take your morning medications with a small sip of water.    You must have a  who will check-in with you, and stay in the building while the procedure is underway.  If you do not have a  your sedation will be cancelled.  We will monitor you for at least 30 minutes after the procedure before being discharged home.        What Are the Risks and Complications For This Procedure?  Risks and complication are rare, but can still occur.  You should understand, discuss, and accept these risks before agreeing to the procedure. They include, but are not limited to:  infection  nerve damage   paralysis  injection failure or a need for further injections or additional procedures  continued or worsening of symptoms/pain,   medication reaction,  dural leak (into the hole covering around the spinal cord. This may cause a a spinal headache)  leak of the medication into the spinal canal, nerves, or blood vessel.  death        What do I need to do before the procedure?   If you do not follow these instructions your procedure  may be cancelled.  Tell us if you are on major blood thinners such as Coumadin, Xarelto , Plavix, Eliquis , Pradaxa , or others.    Contact the doctor who prescribed your blood thinner to ask for permission to stop taking it before you have the injection.    Schedule your pain injection procedure after your doctor gave their permission.   We will notify you when to stop and re-start your blood thinner.  Tell us if you have any allergies to contrast dye.  If you do, we may give additional medications to take before the procedure.  Tell us if you are pregnant, or possibly pregnant.  If so, you cannot receive steroid medications or be exposed to fluoroscopic X-rays.  Tell us if you have been sick during the 10 days before the procedure. This includes:   colds   gastrointestinal illness or discomfort  dental sores,   skin infection, or any other type of infection.  Tell us if you have taken antibiotics during the 10 days prior to the procedure.  Do not drink alcohol the night before or on the day of the procedure.  You must shower the night before and on the day of your procedure.  Wear comfortable, clean clothing.  If you have an outside MRI (Magnetic Resonance Imaging photo), please bring it with you.     What Will Happen After the Procedure?  If you did not receive sedation we will monitor you for 15 minutes after the procedure. If you received sedation, we will monitor you for at least 30 minutes after the procedure      How soon can I expect pain relief?  You have received 2 types of medications with your injection:    Anesthetic - numbing medication which only acts for a few hours    Steroid which may take 3-14 days to be effective.   You can expect to feel your normal pain after the anesthetic wears off, until the steroid becomes effective.     How should I care for myself at home?  Get plenty of rest and avoid twisting, bending movements, heavy lifting, or strenuous activity for the first 24 hours. This will  help the steroid be more effective. Medial Branch Block injections will have different discharge instructions.  Those will be discussed at that injection appointment.  Resume your pain medications  Apply ice packs (on for 20 minutes at a time), every 2-3 hours to your injection area for the first 2-3 days to help with pain control.    Avoid heat (pads or water bottles), which can cause the veins to open up, making the steroid less effective. You can use heat after 48 hours.  Take showers only for the first 48 hours.  No baths, hot tubs, swimming, or soaking for 48 hours to reduce the risk of infection.      When should I call the doctor?  Call us if you have any of the following:   Fever more than 100.5 degrees Fahrenheit   Signs of infection   Severe headache   Severe back pain   Increased numbness or weakness in your legs or arms   Loss of bladder or bowel control  Nausea   Other concerns

## 2023-08-30 ENCOUNTER — APPOINTMENT (OUTPATIENT)
Dept: CT IMAGING | Facility: CLINIC | Age: 58
End: 2023-08-30
Attending: EMERGENCY MEDICINE
Payer: MEDICARE

## 2023-08-30 ENCOUNTER — HOSPITAL ENCOUNTER (EMERGENCY)
Facility: CLINIC | Age: 58
Discharge: HOME | End: 2023-08-30
Attending: EMERGENCY MEDICINE | Admitting: EMERGENCY MEDICINE
Payer: MEDICARE

## 2023-08-30 VITALS
TEMPERATURE: 98.3 F | HEART RATE: 84 BPM | RESPIRATION RATE: 16 BRPM | DIASTOLIC BLOOD PRESSURE: 63 MMHG | SYSTOLIC BLOOD PRESSURE: 108 MMHG | HEIGHT: 69 IN | BODY MASS INDEX: 26.22 KG/M2 | OXYGEN SATURATION: 100 % | WEIGHT: 177 LBS

## 2023-08-30 DIAGNOSIS — N12 PYELONEPHRITIS: ICD-10-CM

## 2023-08-30 LAB
ALBUMIN SERPL BCG-MCNC: 3.7 G/DL (ref 3.5–5.2)
ALBUMIN UR-MCNC: 20 MG/DL
ALP SERPL-CCNC: 101 U/L (ref 35–104)
ALT SERPL W P-5'-P-CCNC: 33 U/L (ref 0–50)
ANION GAP SERPL CALCULATED.3IONS-SCNC: 10 MMOL/L (ref 7–15)
APPEARANCE UR: CLEAR
AST SERPL W P-5'-P-CCNC: 34 U/L (ref 0–45)
BASOPHILS # BLD AUTO: 0 10E3/UL (ref 0–0.2)
BASOPHILS NFR BLD AUTO: 0 %
BILIRUB SERPL-MCNC: 0.4 MG/DL
BILIRUB UR QL STRIP: NEGATIVE
BUN SERPL-MCNC: 11.4 MG/DL (ref 6–20)
CALCIUM SERPL-MCNC: 8.4 MG/DL (ref 8.6–10)
CHLORIDE SERPL-SCNC: 106 MMOL/L (ref 98–107)
COLOR UR AUTO: YELLOW
CREAT SERPL-MCNC: 0.87 MG/DL (ref 0.51–0.95)
DEPRECATED HCO3 PLAS-SCNC: 26 MMOL/L (ref 22–29)
EOSINOPHIL # BLD AUTO: 0.1 10E3/UL (ref 0–0.7)
EOSINOPHIL NFR BLD AUTO: 2 %
ERYTHROCYTE [DISTWIDTH] IN BLOOD BY AUTOMATED COUNT: 14.4 % (ref 10–15)
GFR SERPL CREATININE-BSD FRML MDRD: 78 ML/MIN/1.73M2
GLUCOSE SERPL-MCNC: 105 MG/DL (ref 70–99)
GLUCOSE UR STRIP-MCNC: NEGATIVE MG/DL
HCT VFR BLD AUTO: 36.9 % (ref 35–47)
HGB BLD-MCNC: 11.8 G/DL (ref 11.7–15.7)
HGB UR QL STRIP: NEGATIVE
HYALINE CASTS: 4 /LPF
IMM GRANULOCYTES # BLD: 0 10E3/UL
IMM GRANULOCYTES NFR BLD: 0 %
KETONES UR STRIP-MCNC: 10 MG/DL
LACTATE SERPL-SCNC: 0.6 MMOL/L (ref 0.7–2)
LEUKOCYTE ESTERASE UR QL STRIP: ABNORMAL
LIPASE SERPL-CCNC: 5 U/L (ref 13–60)
LYMPHOCYTES # BLD AUTO: 1.6 10E3/UL (ref 0.8–5.3)
LYMPHOCYTES NFR BLD AUTO: 25 %
MCH RBC QN AUTO: 30.3 PG (ref 26.5–33)
MCHC RBC AUTO-ENTMCNC: 32 G/DL (ref 31.5–36.5)
MCV RBC AUTO: 95 FL (ref 78–100)
MONOCYTES # BLD AUTO: 0.6 10E3/UL (ref 0–1.3)
MONOCYTES NFR BLD AUTO: 9 %
MUCOUS THREADS #/AREA URNS LPF: PRESENT /LPF
NEUTROPHILS # BLD AUTO: 3.9 10E3/UL (ref 1.6–8.3)
NEUTROPHILS NFR BLD AUTO: 64 %
NITRATE UR QL: NEGATIVE
NRBC # BLD AUTO: 0 10E3/UL
NRBC BLD AUTO-RTO: 0 /100
PH UR STRIP: 6 [PH] (ref 5–7)
PLATELET # BLD AUTO: 232 10E3/UL (ref 150–450)
POTASSIUM SERPL-SCNC: 3.9 MMOL/L (ref 3.4–5.3)
PROT SERPL-MCNC: 6.1 G/DL (ref 6.4–8.3)
RBC # BLD AUTO: 3.89 10E6/UL (ref 3.8–5.2)
RBC URINE: 3 /HPF
SODIUM SERPL-SCNC: 142 MMOL/L (ref 136–145)
SP GR UR STRIP: 1.02 (ref 1–1.03)
SQUAMOUS EPITHELIAL: 9 /HPF
TRANSITIONAL EPI: <1 /HPF
UROBILINOGEN UR STRIP-MCNC: 2 MG/DL
WBC # BLD AUTO: 6.1 10E3/UL (ref 4–11)
WBC URINE: 13 /HPF

## 2023-08-30 PROCEDURE — 83690 ASSAY OF LIPASE: CPT | Performed by: EMERGENCY MEDICINE

## 2023-08-30 PROCEDURE — 36415 COLL VENOUS BLD VENIPUNCTURE: CPT | Performed by: EMERGENCY MEDICINE

## 2023-08-30 PROCEDURE — 99284 EMERGENCY DEPT VISIT MOD MDM: CPT | Performed by: EMERGENCY MEDICINE

## 2023-08-30 PROCEDURE — 80053 COMPREHEN METABOLIC PANEL: CPT | Performed by: EMERGENCY MEDICINE

## 2023-08-30 PROCEDURE — 87086 URINE CULTURE/COLONY COUNT: CPT | Performed by: EMERGENCY MEDICINE

## 2023-08-30 PROCEDURE — 74177 CT ABD & PELVIS W/CONTRAST: CPT | Mod: 26 | Performed by: RADIOLOGY

## 2023-08-30 PROCEDURE — G1010 CDSM STANSON: HCPCS

## 2023-08-30 PROCEDURE — 96361 HYDRATE IV INFUSION ADD-ON: CPT | Performed by: EMERGENCY MEDICINE

## 2023-08-30 PROCEDURE — 81001 URINALYSIS AUTO W/SCOPE: CPT | Performed by: EMERGENCY MEDICINE

## 2023-08-30 PROCEDURE — 250N000013 HC RX MED GY IP 250 OP 250 PS 637: Performed by: EMERGENCY MEDICINE

## 2023-08-30 PROCEDURE — 83605 ASSAY OF LACTIC ACID: CPT | Performed by: EMERGENCY MEDICINE

## 2023-08-30 PROCEDURE — 96375 TX/PRO/DX INJ NEW DRUG ADDON: CPT | Performed by: EMERGENCY MEDICINE

## 2023-08-30 PROCEDURE — G1010 CDSM STANSON: HCPCS | Performed by: RADIOLOGY

## 2023-08-30 PROCEDURE — 85025 COMPLETE CBC W/AUTO DIFF WBC: CPT | Performed by: EMERGENCY MEDICINE

## 2023-08-30 PROCEDURE — 250N000011 HC RX IP 250 OP 636: Mod: JZ | Performed by: EMERGENCY MEDICINE

## 2023-08-30 PROCEDURE — 99285 EMERGENCY DEPT VISIT HI MDM: CPT | Mod: 25 | Performed by: EMERGENCY MEDICINE

## 2023-08-30 PROCEDURE — 96365 THER/PROPH/DIAG IV INF INIT: CPT | Mod: 59 | Performed by: EMERGENCY MEDICINE

## 2023-08-30 PROCEDURE — 258N000003 HC RX IP 258 OP 636: Performed by: EMERGENCY MEDICINE

## 2023-08-30 PROCEDURE — 250N000011 HC RX IP 250 OP 636: Performed by: EMERGENCY MEDICINE

## 2023-08-30 RX ORDER — MORPHINE SULFATE 4 MG/ML
4 INJECTION, SOLUTION INTRAMUSCULAR; INTRAVENOUS
Status: COMPLETED | OUTPATIENT
Start: 2023-08-30 | End: 2023-08-30

## 2023-08-30 RX ORDER — CEFDINIR 300 MG/1
300 CAPSULE ORAL 2 TIMES DAILY
Qty: 14 CAPSULE | Refills: 0 | Status: SHIPPED | OUTPATIENT
Start: 2023-08-30 | End: 2023-09-06

## 2023-08-30 RX ORDER — IOPAMIDOL 755 MG/ML
100 INJECTION, SOLUTION INTRAVASCULAR ONCE
Status: COMPLETED | OUTPATIENT
Start: 2023-08-30 | End: 2023-08-30

## 2023-08-30 RX ORDER — HEPARIN SODIUM (PORCINE) LOCK FLUSH IV SOLN 100 UNIT/ML 100 UNIT/ML
5 SOLUTION INTRAVENOUS
Status: DISCONTINUED | OUTPATIENT
Start: 2023-08-31 | End: 2023-08-31 | Stop reason: HOSPADM

## 2023-08-30 RX ORDER — OXYCODONE HYDROCHLORIDE 5 MG/1
5 TABLET ORAL ONCE
Status: COMPLETED | OUTPATIENT
Start: 2023-08-30 | End: 2023-08-30

## 2023-08-30 RX ORDER — CEFTRIAXONE 1 G/1
1 INJECTION, POWDER, FOR SOLUTION INTRAMUSCULAR; INTRAVENOUS ONCE
Status: COMPLETED | OUTPATIENT
Start: 2023-08-30 | End: 2023-08-30

## 2023-08-30 RX ADMIN — OXYCODONE HYDROCHLORIDE 5 MG: 5 TABLET ORAL at 22:55

## 2023-08-30 RX ADMIN — CEFTRIAXONE SODIUM 1 G: 1 INJECTION, POWDER, FOR SOLUTION INTRAMUSCULAR; INTRAVENOUS at 22:56

## 2023-08-30 RX ADMIN — Medication 5 ML: at 23:52

## 2023-08-30 RX ADMIN — IOPAMIDOL 100 ML: 755 INJECTION, SOLUTION INTRAVENOUS at 20:20

## 2023-08-30 RX ADMIN — MORPHINE SULFATE 4 MG: 4 INJECTION INTRAVENOUS at 20:48

## 2023-08-30 RX ADMIN — SODIUM CHLORIDE 1000 ML: 9 INJECTION, SOLUTION INTRAVENOUS at 19:11

## 2023-08-30 ASSESSMENT — ENCOUNTER SYMPTOMS
ABDOMINAL PAIN: 0
ACTIVITY CHANGE: 1
DYSURIA: 0
FATIGUE: 1
NAUSEA: 1
VOMITING: 1
FLANK PAIN: 1
HEMATURIA: 0
WEAKNESS: 0
DIZZINESS: 0
CHILLS: 0
HEADACHES: 0
LIGHT-HEADEDNESS: 0
DIARRHEA: 0
FEVER: 0

## 2023-08-30 ASSESSMENT — ACTIVITIES OF DAILY LIVING (ADL)
ADLS_ACUITY_SCORE: 35
ADLS_ACUITY_SCORE: 33
ADLS_ACUITY_SCORE: 35

## 2023-08-30 NOTE — ED PROVIDER NOTES
ED Provider Note  [facility]    History     Chief Complaint   Patient presents with    Rule out Urinary Tract Infection     HPI  Nathalie Bashir is a 57 year old female  with history of recurrent UTIs and gastric adenocarcinoma presenting with one week of worsening left flank pain. Pain is stabbing in nature and exacerbated by movement. Patient also reports dark urine, urinary urgency, and times when she feels like she has to urinate but only dribbles come out. She has also been feeling fatigued, only able to lay in bed for the last several days. She has vomited the last two days. She has taken her temperature multiple times in the last several days and has not had any fever or chills. No hematuria, no pelvic pain/fullness. She is post-menopausal.  She denies any chest pain, shortness of breath or cough.  No recent falls or traumatic injury.  She denies any pain radiating into her leg, numbness or weakness into the leg.    She was recently treated for a UTI diagnosed on 8/17, and she received a 5 day course of Bactrim. She took the full course. She had this flank pain at that time, which improved but never fully went away and now returned.     ED Course:      Past Medical History  Past Medical History:   Diagnosis Date    Allergic rhinitis     Cancer (H)     stomach cancer    Chronic pain     Closed fracture of distal end of left radius     Depression     Gastric adenocarcinoma (H)     Lumbago     Migraine     Opioid dependence (H)     Other chronic pain     low back    Sacroiliitis (H)     low back pain    Trochanteric bursitis     leg length discrrepancy, hip pain     Past Surgical History:   Procedure Laterality Date    ARTHROPLASTY HIP Left 2016    BACK SURGERY      L4-5 decompression    CATARACT IOL, RT/LT      CHOLECYSTECTOMY N/A 1/28/2020    Procedure: Cholecystectomy;  Surgeon: Yandel Mallory MD;  [facility]: [facility]    ENDOSCOPIC RETROGRADE CHOLANGIOPANCREATOGRAM N/A 7/27/2020     Procedure: ENDOSCOPIC RETROGRADE CHOLANGIOPANCREATOGRAPHY  **Latex Allergy** with jejunal biopsies;  Surgeon: Jeet Aviles MD;  [facility]: [facility]    ESOPHAGOSCOPY, GASTROSCOPY, DUODENOSCOPY (EGD), COMBINED N/A 3/3/2020    Procedure: ESOPHAGOGASTRODUODENOSCOPY, WITH ENDOSCOPIC US (enoxaparin);  Surgeon: Bakari Plata MD;  [facility]    ESOPHAGOSCOPY, GASTROSCOPY, DUODENOSCOPY (EGD), COMBINED N/A 12/7/2020    Procedure: Upper Endoscopy with stent placement;  Surgeon: Jeet Aviles MD;  Location:  OR    ESOPHAGOSCOPY, GASTROSCOPY, DUODENOSCOPY (EGD), COMBINED N/A 2/8/2021    Procedure: ESOPHAGOGASTRODUODENOSCOPY (EGD)AND STENT REMOVAL;  Surgeon: Jeet Aviles MD;  Location:  OR    EYE SURGERY      GASTRECTOMY N/A 10/21/2020    Procedure: Open subtotal gastectomy with David en Y Reconstruction; D1 Lymph Node Disection  **Latex Allergy**;  Surgeon: Yandel Mallory MD;  [facility]: [facility]    IR CHEST PORT PLACEMENT > 5 YRS OF AGE  3/26/2020    IR LUMBAR EPIDURAL STEROID INJECTION  8/25/2009    IR LUMBAR EPIDURAL STEROID INJECTION  9/2/2009    IR LUMBAR EPIDURAL STEROID INJECTION  9/25/2009    IR LUMBAR EPIDURAL STEROID INJECTION  11/23/2009    IR LUMBAR EPIDURAL STEROID INJECTION  4/1/2010    IR LUMBAR EPIDURAL STEROID INJECTION  9/1/2010    IR LUMBAR EPIDURAL STEROID INJECTION  3/10/2011    IR LUMBAR EPIDURAL STEROID INJECTION  8/30/2011    LAPAROSCOPY DIAGNOSTIC (GENERAL) N/A 10/13/2020    Procedure: Diagnostic laparoscopy with peritoneal washings  **Latex Allergy**;  Surgeon: Yandel Mallory MD;  [facility]    OPEN REDUCTION INTERNAL FIXATION RODDING INTRAMEDULLARY FEMUR Left 12/23/2014    Procedure: OPEN REDUCTION INTERNAL FIXATION RODDING INTRAMEDULLARY FEMUR;  Surgeon: Arnol Santiago MD;  [facility]: [facility]    OPEN REDUCTION INTERNAL FIXATION WRIST Left 9/22/2022    Procedure: LEFT WRIST OPEN REDUCTION INTERNAL FIXATION, FRACTURE;  Surgeon: Mark Villalobos MD;  Location: [facility]  OR    ORTHOPEDIC SURGERY      PICC DOUBLE LUMEN PLACEMENT Right 02/07/2020    5 fr double lumen 41 cm    TX LAMINEC/FACETECT/FORAMIN,LUMBAR 1 SEG      Description: Laminectomy Decompress, Facetectomy, Foraminotomy Lumbar Seg;  Recorded: 04/12/2012;    REMOVE HARDWARE LOWER EXTREMITY Left 4/7/2015    Procedure: REMOVE HARDWARE LOWER EXTREMITY;  Surgeon: Arnol Santiago MD;  [facility]    REMOVE HARDWARE RODDING INTRAMEDULLARY FEMUR Left 12/17/2015    Procedure: REMOVE HARDWARE RODDING INTRAMEDULLARY FEMUR;  Surgeon: Arnol Santiago MD;  [facility]    RESECT BONE LOWER EXTREMITY Left 12/23/2014    Procedure: RESECT BONE LOWER EXTREMITY;  Surgeon: Arnol Santiago MD;  [facility]    SHORTENING OSTEOPLASTY FEMUR W/ IM NAILING      WHIPPLE PROCEDURE N/A 1/28/2020    Procedure: Open Whipple procedure and Cholecystectomy;  Surgeon: Yandel Mallory MD;  [facility]: [facility]    WHIPPLE PROCEDURE      ZZC FUSION OF SACROILIAC JOINT      Description: Arthrodesis Sacroiliac Joint Left;  Recorded: 10/07/2011;    ZZC REPAIR DETACH RETINA,SCLERAL BUCKLE       cefdinir (OMNICEF) 300 MG capsule  amitriptyline (ELAVIL) 75 MG tablet  citalopram (CELEXA) 20 MG tablet  COMPOUNDED NON-CONTROLLED SUBSTANCE (CMPD RX) - PHARMACY TO MIX COMPOUNDED MEDICATION  Cyanocobalamin 1000 MCG CAPS  cyclobenzaprine (FLEXERIL) 10 MG tablet  famotidine (PEPCID) 20 MG tablet  fluticasone (FLONASE) 50 MCG/ACT nasal spray  hydrOXYzine (VISTARIL) 25 MG capsule  loperamide (IMODIUM) 2 MG capsule  loratadine-pseudoePHEDrine (CLARITIN-D 24-HOUR)  MG 24 hr tablet  ondansetron (ZOFRAN) 8 MG tablet  oxyCODONE (ROXICODONE) 5 MG tablet  pantoprazole (PROTONIX) 40 MG EC tablet  sennosides (SENOKOT) 8.6 MG tablet  simethicone 80 MG TABS  sulfamethoxazole-trimethoprim (BACTRIM DS) 800-160 MG tablet  SUMAtriptan (IMITREX) 100 MG tablet      Allergies   Allergen Reactions    Gluten Meal Palpitations    Amoxicillin-Pot Clavulanate Rash     Patient  "tolerated Zosyn in 7/2020    Oxycontin [Oxycodone] Other (See Comments)     Confusion, loopy, oxycodone is tolerated.    Pt states she tolerates regular oxycodone, cannot take 12 hour oxycontin.      Pregabalin      interfered with Cymbalta    Topiramate      Tongue numbness, taste alteration, irritable     Acetaminophen Nausea     Urine retention      [place] Extract     Gabapentin Dizziness and GI Disturbance    Ketorolac Headache    Latex Rash    Methadone Fatigue    Mold     Naprosyn [Naproxen] Dizziness and GI Disturbance    Seasonal Allergies      Family History  Family History   Problem Relation Age of Onset    Heart Failure Mother 77    Dementia Father         frontal lobe    No Known Problems Sister     No Known Problems Brother     Anesthesia Reaction No family hx of     Deep Vein Thrombosis No family hx of      Social History   Social History     Tobacco Use    Smoking status: Former     Packs/day: 0.50     Years: 25.00     Pack years: 12.50     Types: Cigarettes     Quit date: 1/1/2020     Years since quitting: 3.6    Smokeless tobacco: Never   Substance Use Topics    Alcohol use: No    Drug use: No       Review of Systems   Constitutional:  Positive for activity change and fatigue. Negative for chills and fever.   Gastrointestinal:  Positive for nausea and vomiting. Negative for abdominal pain and diarrhea.   Genitourinary:  Positive for flank pain and urgency. Negative for dysuria, hematuria and pelvic pain.   Neurological:  Negative for dizziness, syncope, weakness, light-headedness and headaches.     Physical Exam   BP: 120/55  Pulse: 107  Temp: 98.3  F (36.8  C)  Resp: 15  Height: 175.3 cm (5' 9\")  Weight: 80.3 kg (177 lb)  SpO2: 97 %    Physical Exam  Gen: alert, no apparent distress  CV: RRR, normal S1 and S2, no murmurs/rubs/gallops  Resp: CTAB  Abd: soft, non-tender, non-distended. + left CVA tenderness  Skin: warm, dry  Neuro: alert, oriented x3, no focal deficit    ED Course, Procedures, & " Data      Procedures               Results for orders placed or performed during the hospital encounter of 08/30/23   CT Abdomen Pelvis w Contrast     Status: None    Narrative    EXAM: CT ABDOMEN PELVIS W CONTRAST  LOCATION: [facility]  DATE: 8/30/2023    INDICATION: left flank pain  COMPARISON: 05/17/2023  TECHNIQUE: CT scan of the abdomen and pelvis was performed following injection of IV contrast. Multiplanar reformats were obtained. Dose reduction techniques were used.  CONTRAST: 100 mL Isovue-370 iodinated contrast    FINDINGS:   LOWER CHEST: Normal.    HEPATOBILIARY: No significant mass or bile duct dilatation. No calcified gallstones.     PANCREAS: No significant mass, duct dilatation, or inflammatory change.    SPLEEN: Normal size.    ADRENAL GLANDS: No significant nodules.    KIDNEYS/BLADDER: No significant mass, stone, or hydronephrosis.    BOWEL: Postsurgical changes from prior gastric bypass surgery, as well as small bowel anastomoses are again seen. The small bowel is normal in size and caliber. There is a moderate amount of stool seen throughout the colon. Scattered diverticula are seen   in the descending and sigmoid colon without evidence of diverticulitis. The appendix is normal.    LYMPH NODES: No lymphadenopathy.    VASCULATURE: No abdominal aortic aneurysm. Scattered atherosclerotic calcification of the abdominal aorta and iliac vessels are noted.    PELVIC ORGANS: No pelvic masses.    MUSCULOSKELETAL: The left hip prosthesis and left SI joint fixation hardware are again seen in unchanged position. No worrisome osseous lesions are identified.      Impression    IMPRESSION:   1.  Colonic diverticulosis without evidence of diverticulitis  2.  moderate amount of stool seen throughout the colon, correlate for constipation  3.  normal appendix   UA with Microscopic reflex to Culture     Status: Abnormal    Specimen: Urine, Midstream   Result Value Ref  Range    Color Urine Yellow Colorless, Straw, Light Yellow, Yellow    Appearance Urine Clear Clear    Glucose Urine Negative Negative mg/dL    Bilirubin Urine Negative Negative    Ketones Urine 10 (A) Negative mg/dL    Specific Gravity Urine 1.022 1.003 - 1.035    Blood Urine Negative Negative    pH Urine 6.0 5.0 - 7.0    Protein Albumin Urine 20 (A) Negative mg/dL    Urobilinogen Urine 2.0 Normal, 2.0 mg/dL    Nitrite Urine Negative Negative    Leukocyte Esterase Urine Moderate (A) Negative    Mucus Urine Present (A) None Seen /LPF    RBC Urine 3 (H) <=2 /HPF    WBC Urine 13 (H) <=5 /HPF    Squamous Epithelials Urine 9 (H) <=1 /HPF    Transitional Epithelials Urine <1 <=1 /HPF    Hyaline Casts Urine 4 (H) <=2 /LPF    Narrative    Urine Culture ordered based on laboratory criteria   Comprehensive metabolic panel     Status: Abnormal   Result Value Ref Range    Sodium 142 136 - 145 mmol/L    Potassium 3.9 3.4 - 5.3 mmol/L    Chloride 106 98 - 107 mmol/L    Carbon Dioxide (CO2) 26 22 - 29 mmol/L    Anion Gap 10 7 - 15 mmol/L    Urea Nitrogen 11.4 6.0 - 20.0 mg/dL    Creatinine 0.87 0.51 - 0.95 mg/dL    Calcium 8.4 (L) 8.6 - 10.0 mg/dL    Glucose 105 (H) 70 - 99 mg/dL    Alkaline Phosphatase 101 35 - 104 U/L    AST 34 0 - 45 U/L    ALT 33 0 - 50 U/L    Protein Total 6.1 (L) 6.4 - 8.3 g/dL    Albumin 3.7 3.5 - 5.2 g/dL    Bilirubin Total 0.4 <=1.2 mg/dL    GFR Estimate 78 >60 mL/min/1.73m2   Lipase     Status: Abnormal   Result Value Ref Range    Lipase 5 (L) 13 - 60 U/L   Lactic acid whole blood     Status: Abnormal   Result Value Ref Range    Lactic Acid 0.6 (L) 0.7 - 2.0 mmol/L   CBC with platelets and differential     Status: None   Result Value Ref Range    WBC Count 6.1 4.0 - 11.0 10e3/uL    RBC Count 3.89 3.80 - 5.20 10e6/uL    Hemoglobin 11.8 11.7 - 15.7 g/dL    Hematocrit 36.9 35.0 - 47.0 %    MCV 95 78 - 100 fL    MCH 30.3 26.5 - 33.0 pg    MCHC 32.0 31.5 - 36.5 g/dL    RDW 14.4 10.0 - 15.0 %    Platelet Count  232 150 - 450 10e3/uL    % Neutrophils 64 %    % Lymphocytes 25 %    % Monocytes 9 %    % Eosinophils 2 %    % Basophils 0 %    % Immature Granulocytes 0 %    NRBCs per 100 WBC 0 <1 /100    Absolute Neutrophils 3.9 1.6 - 8.3 10e3/uL    Absolute Lymphocytes 1.6 0.8 - 5.3 10e3/uL    Absolute Monocytes 0.6 0.0 - 1.3 10e3/uL    Absolute Eosinophils 0.1 0.0 - 0.7 10e3/uL    Absolute Basophils 0.0 0.0 - 0.2 10e3/uL    Absolute Immature Granulocytes 0.0 <=0.4 10e3/uL    Absolute NRBCs 0.0 10e3/uL   CBC with platelets differential     Status: None    Narrative    The following orders were created for panel order CBC with platelets differential.  Procedure                               Abnormality         Status                     ---------                               -----------         ------                     CBC with platelets and d...[[id number]]                      Final result                 Please view results for these tests on the individual orders.     Medications   cefTRIAXone (ROCEPHIN) 1 g vial to attach to  mL bag for ADULTS or NS 50 mL bag for PEDS (1 g Intravenous $New Bag 8/30/23 2256)   0.9% sodium chloride BOLUS (0 mLs Intravenous Stopped 8/30/23 2257)   iopamidol (ISOVUE-370) solution 100 mL (100 mLs Intravenous $Given 8/30/23 2020)   sodium chloride (PF) 0.9% PF flush 90 mL (90 mLs Intravenous $Given 8/30/23 2020)   morphine (PF) injection 4 mg (4 mg Intravenous $Given 8/30/23 2048)   oxyCODONE (ROXICODONE) tablet 5 mg (5 mg Oral $Given 8/30/23 2255)     Labs Ordered and Resulted from Time of ED Arrival to Time of ED Departure   ROUTINE UA WITH MICROSCOPIC REFLEX TO CULTURE - Abnormal       Result Value    Color Urine Yellow      Appearance Urine Clear      Glucose Urine Negative      Bilirubin Urine Negative      Ketones Urine 10 (*)     Specific Gravity Urine 1.022      Blood Urine Negative      pH Urine 6.0      Protein Albumin Urine 20 (*)     Urobilinogen Urine 2.0      Nitrite Urine  Negative      Leukocyte Esterase Urine Moderate (*)     Mucus Urine Present (*)     RBC Urine 3 (*)     WBC Urine 13 (*)     Squamous Epithelials Urine 9 (*)     Transitional Epithelials Urine <1      Hyaline Casts Urine 4 (*)    COMPREHENSIVE METABOLIC PANEL - Abnormal    Sodium 142      Potassium 3.9      Chloride 106      Carbon Dioxide (CO2) 26      Anion Gap 10      Urea Nitrogen 11.4      Creatinine 0.87      Calcium 8.4 (*)     Glucose 105 (*)     Alkaline Phosphatase 101      AST 34      ALT 33      Protein Total 6.1 (*)     Albumin 3.7      Bilirubin Total 0.4      GFR Estimate 78     LIPASE - Abnormal    Lipase 5 (*)    LACTIC ACID WHOLE BLOOD - Abnormal    Lactic Acid 0.6 (*)    CBC WITH PLATELETS AND DIFFERENTIAL    WBC Count 6.1      RBC Count 3.89      Hemoglobin 11.8      Hematocrit 36.9      MCV 95      MCH 30.3      MCHC 32.0      RDW 14.4      Platelet Count 232      % Neutrophils 64      % Lymphocytes 25      % Monocytes 9      % Eosinophils 2      % Basophils 0      % Immature Granulocytes 0      NRBCs per 100 WBC 0      Absolute Neutrophils 3.9      Absolute Lymphocytes 1.6      Absolute Monocytes 0.6      Absolute Eosinophils 0.1      Absolute Basophils 0.0      Absolute Immature Granulocytes 0.0      Absolute NRBCs 0.0     URINE CULTURE     CT Abdomen Pelvis w Contrast   Final Result   IMPRESSION:    1.  Colonic diverticulosis without evidence of diverticulitis   2.  moderate amount of stool seen throughout the colon, correlate for constipation   3.  normal appendix           Critical care was not performed.     Medical Decision Making  The patient's presentation was of moderate complexity (an acute illness with systemic symptoms).    The patient's evaluation involved:  review of external note(s) from 1 sources (urology notes)  review of 1 test result(s) ordered prior to this encounter (urine cultures)  ordering and/or review of 3+ test(s) in this encounter (see separate area of note for  details)  independent interpretation of testing performed by another health professional (abd CT)    The patient's management necessitated moderate risk (prescription drug management including medications given in the ED), high risk (a parenteral controlled substance), and high risk (a decision regarding hospitalization).    Assessment & Plan    Nathalie Bashir is a 57 y.o. with history of recurrent UTIs and gastric adenocarcinoma presenting with worsening left flank pain, urinary frequency, vomiting suspicious for pyelonephritis.    # Flank Pain, left  # Urinary Urgency  # Recurrent UTI  # Vomiting  1 week of worsening stabbing left flank pain, urinary urgency. Two days vomiting. No fever or hematuria. UTI diagnosed 8/17, treated with 5 day course of bactrim. Patient had flank pain at that time, which improved with antibiotics but never fully resolved. Suspect pyelonephritis, given urinary symptoms, CVA tenderness, and recent UTI. Likely previous infection was did not resolve. Possible this may be a complicated UTI as well, given it did not completely resolve with antibiotics. Urinalysis with moderate leukocytes esterase, squamous cells, wbcs. However, would expect it to be dirtier if pt had pylonephritis. Given unclear clinical picture and patient's cancer history. CT abdomen/pelvis obtained to rule out nephrolithiasis and malignancy, showed moderate stool burden which may be contributing. Could be nephrolithiasis, but less likely given subacute onset and no gross hematuria. Unlikely to be gynecologic, such as ovarian cyst, given she is post-menopausal and lacks pelvic pain/pressure, pelvic symptoms, vaginal symptoms. Given cancer history considered that there could be mass somewhere in the abdomen putting pressure on ureter, however this was ruled out with CT abdomen pelvis.  - Given that urinalysis is borderline, and workup negative for other sources, will treat pt for UTI.  - 1 dose ceftriaxone in ED  - Will  discharge pt home on cefdinir     #Tachycardia  Pulse 107 at presentation. Likely due to being fluid down, given recent illness and vomiting. Improved to 84 with pain management and fluid bolus.     --    ED Attending Physician Attestation    I Jada Trujillo MD, cared for this patient with the Medical Student. I performed, or re-performed, the physical exam and medical decision-making. I have verified the accuracy of all the medical student findings and documentation above, and have edited as necessary.    Summary of HPI, PE, ED Course   Patient is a 57 year old female evaluated in the emergency department for left flank pain and urinary symptoms.  Differential diagnosis includes but is not limited to pyelonephritis, ureterolithiasis, intra-abdominal malignancy, diverticulitis, peptic ulcer disease, musculoskeletal pain less likely AAA, PE or pneumonia. Exam and ED course notable for UA with leukocyte Estrace present and slight increase in white blood cells.  No leukocytosis, creatinine is within normal limits, electrolytes, LFTs and lipase within normal limits.  CT was obtained which shows no evidence of recurrent intra-abdominal malignancy, hydronephrosis, ureterolithiasis or other acute findings.  She is noted to be constipated on CT.  She was treated with IV fluids, analgesics and ceftriaxone with improvement in symptoms.  She is tolerating p.o. and does not have evidence of sepsis.  We will plan to discharge to home with cefdinir and close follow-up with her outpatient primary care providers.  She was given close return precautions for the emergency department and voiced understanding.     Jada Trujillo MD  Emergency Medicine       I have reviewed the nursing notes. I have reviewed the findings, diagnosis, plan and need for follow up with the patient.    New Prescriptions    CEFDINIR (OMNICEF) 300 MG CAPSULE    Take 1 capsule (300 mg) by mouth 2 times daily for 7 days       Final diagnoses:    Pyelonephritis     María Baig, MS4  [facility]    Jada Blankenship MD  [facility] EMERGENCY DEPARTMENT  8/30/2023     Ronnie, Jada Sood MD  08/31/23 0936

## 2023-08-30 NOTE — ED TRIAGE NOTES
Pt ambulatory to triage with c/o possible UTI. Pt states she has been having back pain x1 week, and urine is darker in color.      Triage Assessment       Row Name 08/30/23 1864       Triage Assessment (Adult)    Airway WDL WDL       Respiratory WDL    Respiratory WDL WDL       Skin Circulation/Temperature WDL    Skin Circulation/Temperature WDL WDL       Cardiac WDL    Cardiac WDL WDL       Peripheral/Neurovascular WDL    Peripheral Neurovascular WDL WDL       Cognitive/Neuro/Behavioral WDL    Cognitive/Neuro/Behavioral WDL WDL       Sujit Coma Scale    Best Eye Response 4-->(E4) spontaneous    Best Motor Response 6-->(M6) obeys commands    Best Verbal Response 5-->(V5) oriented    [place] Coma Scale Score 15

## 2023-08-31 NOTE — DISCHARGE INSTRUCTIONS
Please make an appointment to follow up with Your Primary Care Provider in 5-7 days.    Take cefdinir as prescribed for possible urinary infection.    If you have any worsening symptoms including increased pain, fevers, intractable vomiting or other concerns return to the emergency department for re-evaluation.

## 2023-09-01 LAB — BACTERIA UR CULT: NORMAL

## 2023-09-01 NOTE — RESULT ENCOUNTER NOTE
Final urine culture report is negative.  Adult Negative Urine culture parameters per protocol: Any # Urogenital single or mixed organism, <10,000 col/ml single organism (cath/midstream), and > 3 organisms (No susceptibilities performed).  [facility] Emergency Dept discharge antibiotic prescribed (If applicable): Cefdinir  Treatment recommendations per [facility] ED Lab Result Urine Culture protocol.

## 2023-09-03 ENCOUNTER — HOSPITAL ENCOUNTER (OUTPATIENT)
Dept: MRI IMAGING | Facility: HOSPITAL | Age: 58
Discharge: HOME | End: 2023-09-03
Attending: PHYSICAL MEDICINE & REHABILITATION | Admitting: PHYSICAL MEDICINE & REHABILITATION
Payer: MEDICARE

## 2023-09-03 DIAGNOSIS — M53.3 SACROILIAC JOINT PAIN: ICD-10-CM

## 2023-09-03 DIAGNOSIS — M47.816 LUMBAR FACET ARTHROPATHY: ICD-10-CM

## 2023-09-03 DIAGNOSIS — M47.816 LUMBAR SPONDYLOSIS: ICD-10-CM

## 2023-09-03 PROCEDURE — 72148 MRI LUMBAR SPINE W/O DYE: CPT | Mod: ME

## 2023-09-05 ENCOUNTER — LAB (OUTPATIENT)
Dept: LAB | Facility: CLINIC | Age: 58
End: 2023-09-05
Payer: MEDICARE

## 2023-09-05 ENCOUNTER — HOSPITAL ENCOUNTER (OUTPATIENT)
Dept: GENERAL RADIOLOGY | Facility: HOSPITAL | Age: 58
Discharge: HOME | End: 2023-09-05
Attending: INTERNAL MEDICINE | Admitting: INTERNAL MEDICINE
Payer: MEDICARE

## 2023-09-05 ENCOUNTER — OFFICE VISIT (OUTPATIENT)
Dept: RHEUMATOLOGY | Facility: CLINIC | Age: 58
End: 2023-09-05
Attending: STUDENT IN AN ORGANIZED HEALTH CARE EDUCATION/TRAINING PROGRAM
Payer: MEDICARE

## 2023-09-05 VITALS
BODY MASS INDEX: 26.26 KG/M2 | WEIGHT: 177.8 LBS | DIASTOLIC BLOOD PRESSURE: 70 MMHG | OXYGEN SATURATION: 99 % | HEART RATE: 88 BPM | SYSTOLIC BLOOD PRESSURE: 106 MMHG

## 2023-09-05 DIAGNOSIS — M25.50 MULTIPLE JOINT PAIN: ICD-10-CM

## 2023-09-05 DIAGNOSIS — M53.3 SACROILIAC JOINT PAIN: ICD-10-CM

## 2023-09-05 DIAGNOSIS — M25.40 JOINT SWELLING: ICD-10-CM

## 2023-09-05 DIAGNOSIS — N39.0 RECURRENT UTI: ICD-10-CM

## 2023-09-05 PROCEDURE — 80053 COMPREHEN METABOLIC PANEL: CPT

## 2023-09-05 PROCEDURE — 86431 RHEUMATOID FACTOR QUANT: CPT

## 2023-09-05 PROCEDURE — 73560 X-RAY EXAM OF KNEE 1 OR 2: CPT | Mod: 50

## 2023-09-05 PROCEDURE — 86200 CCP ANTIBODY: CPT

## 2023-09-05 PROCEDURE — 36415 COLL VENOUS BLD VENIPUNCTURE: CPT

## 2023-09-05 PROCEDURE — 99204 OFFICE O/P NEW MOD 45 MIN: CPT | Performed by: INTERNAL MEDICINE

## 2023-09-05 PROCEDURE — 84550 ASSAY OF BLOOD/URIC ACID: CPT

## 2023-09-05 NOTE — PROGRESS NOTES
This document was created using a software with less than 100% fidelity, at times resulting in unintended, even erroneous syntax and grammar.  The reader is advised to keep this under consideration while reviewing, interpreting this note.      Rheumatology Consult Note      Nathalie Bashir     YOB: 1965 Age: 57 year old    Date of visit: 9/05/23    PCP: Alban Yuan    Chief Complaint   Patient presents with:  Consult      Assessment and Plan     Nathalie was seen today for consult.    Diagnoses and all orders for this visit:    Multiple joint pain  -     Adult Rheumatology  Referral  -     Cyclic Citrullinated Peptide Antibody IgG; Future  -     Rheumatoid factor; Future  -     Uric acid; Future  -     XR Knee AP/Lat Standing Bilateral; Future    Sacroiliac joint pain  -     Adult Rheumatology  Referral    Joint swelling  -     Adult Rheumatology  Referral           This patient has widespread polyarthralgias in the background of osteoarthritis, hypermobility, status post left hip arthroplasty.  While she has the symptoms prior to discovery of gastric cancer and chemotherapy followed by surgery, she feels that since then her symptoms of gotten worse.  Currently she is on narcotics and hoping to get to a new pain clinic.  She is already tried duloxetine in the past and was not able to continue because of the behavioral issues associated with that.  Today we discussed the group of inflammatory joint diseases, connective tissue disease.  Does not seem like she was treated with checkpoint inhibitors.  Further work-up as noted.  She would be inclined to consider corticosteroid injection showed turned out that there is no significant additional arthropathy beyond OA.X-rays of knees done today, personally reviewed the films, findings: Reduced medial joint space, osteophyte formation commensurate with early osteoarthritis.         HPI   Nathalie Bashir is a 57 year old  "female  is seen today for evaluation of polyarthralgias.  She reports longstanding history of painful joints.  She has had hip replacement in 2015.  This was unfortunately complicated by a staph infection that needed revision she was homebound for 3 months.  She was told around that time that she is still hyperflexible which she recalls was the case for example she did splits on her 51th birthday.  She has noted painful joints in the hands especially the left hand pointing the DIPs and right upper extremity pain especially in the right shoulder, worse with day-to-day activities such as vacuuming.  Even before her's gastric cancer chemotherapy surgery, the details of some of the events are noted in the oncology note excerpt attached below, she recalls that she had almost given up on vacuuming because of the right shoulder pain.  Should she lift something heavy the pain gets worse.  If she sleeps on that side the pain can wake her up.  The symptoms were there prior to her chemotherapy for gastric malignancy for which she underwent chemotherapy in 2020 this was followed by gastric surgery subsequent to which she was told by her surgeons not to take nonsteroidals.  She does not even take acetaminophen..  She has noted neuropathy affecting lower extremities since the chemotherapy.  Several years ago she had sacroiliac joint surgery it sounds a fusion was done.  This was in 2008.  She reports no personal or family history of psoriasis ulcerative colitis Crohn's disease there is no family history of lupus rheumatoid arthritis or ankylosing spondylitis.    She reports that the pain apart from the above-noted areas she experiences pain \"all over.  It is hard to know the overlap between neuropathy and her joint symptoms.  She noted some of the pains are severe such as pointing to the DIPs, the shoulder, and right hip, pointing to the groin area.  She is noted that the pain in the hands and other joints tends to be worse with " activity such as writing bills out.  Depending upon what she does she can also develop swelling especially toward the end of the day in her feet.  Sometimes she has seen her hands swollen first thing in the morning again for no clear reasons.  She is on narcotics which helped somewhat.  In the past she had taken duloxetine for a long time without realizing how much it affected her behavior she is no longer on it.  Indeed as she noted in the questionnaire there is no joint area that does not hurt.  She noted pain level overall at 6.0/10.  She has difficulty performing a variety of day-to-day activities.  In the mornings however she is stiff for a few minutes before she limbers up.  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       Following is the excerpt from a previous oncology note:      Ms. Bashir is a 57 year old female with a history of gastric adenocarcinoma.  She was being followed for a pancreatic duct dilatation and pancreatic cyst which was noted in November 2018.  Since May 2019 she started noticing some nausea and vomiting and occasional abdominal discomfort.  She had an ultrasound in August 2019 which was essentially unremarkable.  She had a repeat endoscopic ultrasound on 10/29/2019 which showed increase in size of the cyst as well as dilation of the main pancreatic duct.  FNA and fluid analysis showed mucinous epithelium.  She was referred to Dr. 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  Surprisingly there was no pancreatic ductal neoplasm seen but on the resected specimen stomach cancer was noted.  It was poorly differentiated adenocarcinoma with poorly cohesive/signet ring cell type with proximal gastric mucosal and serosal margins being positive.  There was lymphovascular and perineural invasion seen.  22 lymph nodes  were sampled and all were benign.  It was a pT4aN0 lesion.  HER-2/christine FISH was not amplified.         EUS on 3/3/2020 without clear evidence of neoplasm endoscopically. Left gastric lymph node fine-needle aspiration was negative for carcinoma. Biopsy from the gastric jejunal anastomosis as well as lesser curvature of the stomach also did not show any malignancy.     She had a PET/CT on 3/13/2020 which showed soft tissue streaky density with increased FDG uptake adjacent to the pancreaticojejunostomy which could be neoplastic in origin.  There is mild increased FDG uptake in the gastric remnant.  Focal area of soft tissue thickening with fatty streakiness along medial laparotomy with increased FDG uptake which likely is inflammatory. Increased FDG uptake in thoracic esophagus which could be esophagitis.      She started on chemotherapy with 5FU, oxaliplatin, and Taxotere on 3/27/20. She was seen on 4/7/20 for evaluation of fevers. She was treated for possible pneumonia with Levaquin.      She was seen in clinic 4/13/20 for RUQ pain,CT and labs reassuring. She received cycles 2 and 3 of chemotherapy on 4/20/20 and 5/4/20. She was admitted from 5/21-5/23/20 with neutropenic aspiration pneumonia. She received cycle 4 on 5/27/20 with Neulasta support. PET/CT on 6/3/20 showed mild residual uptake at the site of gastrojejunal anastomosis which likely is physiologic.  There is almost resolution of soft tissue nodule next to the pancreaticoduodenal anastomosis without FDG uptake.  There is focal increased uptake in the left posterior acetabulum which could be artifact.     Decreased dose of oxaliplatin due to neuropathy/cold sensitivity on 6/11. Decreased 5FU to 85% with cycle #6 due to mucositis/diarrhea/taste changes. Treatment was held 7/9/20 due to diarrhea, dehydration, tachycardia, rash, and weight loss and she got cycle 7 on 7/15. On 7/20 she presented to the ED with RUQ abdominal pain, and was admitted 7/20-7/29 for  acute cholangitis      She received C#8 FLOT on 8/14/2020.     PET/CT on 9/2/2020 did not show evidence of disease.  Small peritoneal nodule was a stable.  This was most consistent with fat necrosis.     CT chest abdomen and pelvis on 9/23/2020 also does not show evidence of malignancy recurrence and the peritoneal nodule in the right upper quadrant is stable.  Before definite surgery she had diagnostic laparoscopy which was negative for evidence of malignancy so on 10/21/2020 she had exploratory laparotomy with extensive lysis of additions, partial omentectomy, resection of Gastrojejunal Anastomosis w/ En Bloc Subtotal Gastrectomy and modified D2 Lymphadenectomy with David-en-Y Gastrojejunostomy Reconstruction.  Final pathology showed Focal residual poorly differentiated adenocarcinoma (0.4cm) with signet-ring morphology status    post-neoadjuvant chemotherapy and all 9 lymph nodes were benign.  All margins were negative.  It was a pT1bN0 lesion.     Before definite surgery she had diagnostic laparoscopy which was negative for evidence of malignancy so on 10/21/2020 she had exploratory laparotomy with extensive lysis of additions, partial omentectomy, resection of Gastrojejunal Anastomosis w/ En Bloc Subtotal Gastrectomy and modified D2 Lymphadenectomy with David-en-Y Gastrojejunostomy Reconstruction.  Final pathology showed Focal residual poorly differentiated adenocarcinoma (0.4cm) with signet-ring morphology status    post-neoadjuvant chemotherapy and all 9 lymph nodes were benign.  All margins were negative.  It was a [id number] lesion.     She was admitted from 11/4/2020-11/6/2020 for nausea vomiting and left upper quadrant pain and CT scan showed fluid collection in the left upper quadrant, likely hematoma.  Upper GI was negative for leak.  No drainage was recommended by IR.  He was discharged on 7 days of levofloxacin and Flagyl.        11/20/2020.  CT abdomen and pelvis showed postsurgical changes of Whipple and  subtotal gastrectomy with David-en-Y reconstruction.  There is resolution of previously demonstrated fluid collection between the stomach and the spleen and decrease in sigmoid thickening.  Small left pleural effusion with atelectasis adjacent to it.     12/7/2020.  EGD showed healthy-appearing and generous pouch (remaining the stomach) with widely patent end to side gastrojejunostomy, though with the David/enteral limb mildly stenostic and tortuous at its origin. Therefore a covered metal stent was placed from stomach to jejunum across the stenotic region, resulting in straightening of the course.     2/8/2021.  EGD was done and stent was removed.     5/21/2021.  CT chest abdomen and pelvis does not show evidence of recurrence of malignancy.  11/15/2021-CT chest abdomen and pelvis without evidence of recurrence.  5/17/2022.  CT chest abdomen and pelvis without evidence of recurrence.     She was admitted to the hospital from 8/22/2020 - 8/26/2022 for right-sided pyelonephritis and E. coli bacteremia.  I reviewed the discharge summary.    She was started on meropenem and then ceftriaxone and discharged on levofloxacin.    In September 2022, she had a trauma and fractured her left wrist which required ORIF.        Active Problem List     Patient Active Problem List   Diagnosis    Lower limb length difference    IPMN (intraductal papillary mucinous neoplasm)    Allergic rhinitis    Arthralgia of hip    Bursitis    Chronic neck pain    Chronic pain disorder    Continuous opioid dependence (H)    Controlled substance agreement signed    Debility    Degeneration of intervertebral disc of lumbosacral region    Depression    Disorder of sacrum    Greater trochanteric bursitis of left hip    Infected prosthesis of left hip (H)    Chronic bilateral low back pain without sciatica    MDD (major depressive disorder), recurrent, in partial remission (H)    Migraine with aura    Sacroiliitis (H)    Screening for malignant neoplasm  of cervix    Spondylosis of lumbosacral spine without myelopathy    Status post left hip replacement    Cervical radiculopathy, chronic    Unilateral inguinal hernia without obstruction or gangrene    Long term (current) use of opiate analgesic    Low urine output    Hypophosphatemia    Leukocytosis    Intra-abdominal fluid collection    Bacteremia due to Gram-negative bacteria    Malnutrition (H)    Gastric adenocarcinoma (H)    Pneumonia    Mucositis    Chemotherapy-induced neutropenia (H)    Gastric cancer (H)    Cholangitis    Thrombocytopenia (H)    Urinary tract infection    Crohn's disease of small intestine (H)    Jejunitis    Colitis    Abdominal pain    Malignant neoplasm of overlapping sites of stomach (H)    Iron deficiency anemia    Anemia    Paralytic ileus (H)    Hypokalemia    Nausea & vomiting    Dysphagia    History of gastric surgery    Chemotherapy-induced neuropathy (H)    Bilateral hand pain    Pyelonephritis    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)    Other closed intra-articular fracture of distal end of left radius, initial encounter     Past Medical History     Past Medical History:   Diagnosis Date    Allergic rhinitis     Cancer (H)     stomach cancer    Chronic pain     Closed fracture of distal end of left radius     Depression     Gastric adenocarcinoma (H)     Lumbago     Migraine     Opioid dependence (H)     Other chronic pain     low back    Sacroiliitis (H)     low back pain    Trochanteric bursitis     leg length discrrepancy, hip pain     Past Surgical History     Past Surgical History:   Procedure Laterality Date    ARTHROPLASTY HIP Left 2016    BACK SURGERY      L4-5 decompression    CATARACT IOL, RT/LT      CHOLECYSTECTOMY N/A 1/28/2020    Procedure: Cholecystectomy;  Surgeon: Yandel Mallory MD;  Location: [facility] OR    ENDOSCOPIC RETROGRADE CHOLANGIOPANCREATOGRAM N/A 7/27/2020    Procedure: ENDOSCOPIC RETROGRADE CHOLANGIOPANCREATOGRAPHY  **Latex  Allergy** with jejunal biopsies;  Surgeon: Jeet Aviles MD;  [facility]    ESOPHAGOSCOPY, GASTROSCOPY, DUODENOSCOPY (EGD), COMBINED N/A 3/3/2020    Procedure: ESOPHAGOGASTRODUODENOSCOPY, WITH ENDOSCOPIC US (enoxaparin);  Surgeon: Bakari Plata MD;  [facility]    ESOPHAGOSCOPY, GASTROSCOPY, DUODENOSCOPY (EGD), COMBINED N/A 12/7/2020    Procedure: Upper Endoscopy with stent placement;  Surgeon: Jeet Aviles MD;  Location:  OR    ESOPHAGOSCOPY, GASTROSCOPY, DUODENOSCOPY (EGD), COMBINED N/A 2/8/2021    Procedure: ESOPHAGOGASTRODUODENOSCOPY (EGD)AND STENT REMOVAL;  Surgeon: Jeet Aviles MD;  Location:  OR    EYE SURGERY      GASTRECTOMY N/A 10/21/2020    Procedure: Open subtotal gastectomy with David en Y Reconstruction; D1 Lymph Node Disection  **Latex Allergy**;  Surgeon: Yandel Mallory MD;  Location: [facility] OR    IR CHEST PORT PLACEMENT > 5 YRS OF AGE  3/26/2020    IR LUMBAR EPIDURAL STEROID INJECTION  8/25/2009    IR LUMBAR EPIDURAL STEROID INJECTION  9/2/2009    IR LUMBAR EPIDURAL STEROID INJECTION  9/25/2009    IR LUMBAR EPIDURAL STEROID INJECTION  11/23/2009    IR LUMBAR EPIDURAL STEROID INJECTION  4/1/2010    IR LUMBAR EPIDURAL STEROID INJECTION  9/1/2010    IR LUMBAR EPIDURAL STEROID INJECTION  3/10/2011    IR LUMBAR EPIDURAL STEROID INJECTION  8/30/2011    LAPAROSCOPY DIAGNOSTIC (GENERAL) N/A 10/13/2020    Procedure: Diagnostic laparoscopy with peritoneal washings  **Latex Allergy**;  Surgeon: Yandel Mallory MD;  Location: [facility] OR    OPEN REDUCTION INTERNAL FIXATION RODDING INTRAMEDULLARY FEMUR Left 12/23/2014    Procedure: OPEN REDUCTION INTERNAL FIXATION RODDING INTRAMEDULLARY FEMUR;  Surgeon: Arnol Santiago MD;  Location: [facility] OR    OPEN REDUCTION INTERNAL FIXATION WRIST Left 9/22/2022    Procedure: LEFT WRIST OPEN REDUCTION INTERNAL FIXATION, FRACTURE;  Surgeon: Mark Villalobos MD;  Location: [facility] OR    ORTHOPEDIC SURGERY      PICC DOUBLE LUMEN PLACEMENT Right  02/07/2020    5 fr double lumen 41 cm    PA LAMINEC/FACETECT/FORAMIN,LUMBAR 1 SEG      Description: Laminectomy Decompress, Facetectomy, Foraminotomy Lumbar Seg;  Recorded: 04/12/2012;    REMOVE HARDWARE LOWER EXTREMITY Left 4/7/2015    Procedure: REMOVE HARDWARE LOWER EXTREMITY;  Surgeon: Arnol Santiago MD;  Location: [facility]    REMOVE HARDWARE RODDING INTRAMEDULLARY FEMUR Left 12/17/2015    Procedure: REMOVE HARDWARE RODDING INTRAMEDULLARY FEMUR;  Surgeon: Arnol Santiago MD;  Location: [facility]    RESECT BONE LOWER EXTREMITY Left 12/23/2014    Procedure: RESECT BONE LOWER EXTREMITY;  Surgeon: Arnol Santiago MD;  Location: [facility]    SHORTENING OSTEOPLASTY FEMUR W/ IM NAILING      WHIPPLE PROCEDURE N/A 1/28/2020    Procedure: Open Whipple procedure and Cholecystectomy;  Surgeon: Yandel Mallory MD;  Location: [facility] OR    WHIPPLE PROCEDURE      ZZC FUSION OF SACROILIAC JOINT      Description: Arthrodesis Sacroiliac Joint Left;  Recorded: 10/07/2011;    ZZC REPAIR DETACH RETINA,SCLERAL BUCKLE       Allergy     Allergies   Allergen Reactions    Gluten Meal Palpitations    Amoxicillin-Pot Clavulanate Rash     Patient tolerated Zosyn in 7/2020    Oxycontin [Oxycodone] Other (See Comments)     Confusion, loopy, oxycodone is tolerated.    Pt states she tolerates regular oxycodone, cannot take 12 hour oxycontin.      Pregabalin      interfered with Cymbalta    Topiramate      Tongue numbness, taste alteration, irritable     Acetaminophen Nausea     Urine retention      D[place] Extract     Gabapentin Dizziness and GI Disturbance    Ketorolac Headache    Latex Rash    Methadone Fatigue    Mold     Naprosyn [Naproxen] Dizziness and GI Disturbance    Seasonal Allergies      Current Medication List     Current Outpatient Medications   Medication Sig    amitriptyline (ELAVIL) 75 MG tablet Take 1 tablet (75 mg) by mouth At Bedtime    cefdinir (OMNICEF) 300 MG capsule Take 1 capsule (300 mg) by mouth 2 times daily for 7 days     citalopram (CELEXA) 20 MG tablet Take 1.5 tablets (30 mg) by mouth daily    COMPOUNDED NON-CONTROLLED SUBSTANCE (CMPD RX) - PHARMACY TO MIX COMPOUNDED MEDICATION Estriol 1 mg/gram,place 1 gram vaginally three times per week,42.5 grams    Cyanocobalamin 1000 MCG CAPS Take 1 tablet by mouth every morning    cyclobenzaprine (FLEXERIL) 10 MG tablet TAKE 1 TABLET BY MOUTH 3 TIMES DAILY AS NEEDED FOR MUSCLE SPASMS    famotidine (PEPCID) 20 MG tablet TAKE 1 TABLET (20 MG) BY MOUTH 2 TIMES DAILY AS NEEDED    fluticasone (FLONASE) 50 MCG/ACT nasal spray Spray 1 spray into both nostrils daily at 2 pm    hydrOXYzine (VISTARIL) 25 MG capsule Take 25-50 mg by mouth 4 times daily as needed for itching or anxiety    loperamide (IMODIUM) 2 MG capsule Take 4 mg by mouth 4 times daily as needed for diarrhea    loratadine-pseudoePHEDrine (CLARITIN-D 24-HOUR)  MG 24 hr tablet Take 1 tablet by mouth daily as needed for allergies Usually takes in Spring/Fall    ondansetron (ZOFRAN) 8 MG tablet Take 1 tablet (8 mg) by mouth every 8 hours as needed (Nausea/Vomiting)    oxyCODONE (ROXICODONE) 5 MG tablet Take 1 tablet (5 mg) by mouth 3 times daily as needed for breakthrough pain or moderate to severe pain Maximum 3 pills/day.    pantoprazole (PROTONIX) 40 MG EC tablet TAKE 1 TABLET BY MOUTH TWICE A DAY    sennosides (SENOKOT) 8.6 MG tablet 1-2 tablets 1-2 times daily as needed.    simethicone 80 MG TABS Take 1 tablet by mouth every 6 hours as needed (bloating, gas, belching)    sulfamethoxazole-trimethoprim (BACTRIM DS) 800-160 MG tablet Take 1 tablet by mouth 2 times daily    SUMAtriptan (IMITREX) 100 MG tablet Take 100 mg by mouth at onset of headache for migraine     No current facility-administered medications for this visit.            Family History     Family History   Problem Relation Age of Onset    Heart Failure Mother 77    Dementia Father         frontal lobe    No Known Problems Sister     No Known Problems Brother      Anesthesia Reaction No family hx of     Deep Vein Thrombosis No family hx of          Physical Exam     COMPREHENSIVE EXAMINATION:  Vitals:    09/05/23 1436   BP: 106/70   Pulse: 88   SpO2: 99%   Weight: 80.6 kg (177 lb 12.8 oz)     A well appearing alert oriented female.  She ambulates with the help of a cane for steadiness secondary to neuropathy related unsteadiness as well as for the left hip stability where she had the arthroplasty.  Vital data as noted above. Her eyes examined for inflammation/scleromalacia. ENT examined for oral mucositis, moisture, thrush, nasal deformity, external ear redness, deformity. Her neck is examined for suppleness and lymphadenopathy.  Cardiopulmonary examination without dyspnea at rest, use of accessory muscles of breathing, wheezing, edema, peripheral or central cyanosis.  Abdomen examined for softness, tenderness, obvious organomegaly.  Skin examined for heliotrope, malar area eruption, lupus pernio, periungual erythema, sclerodactyly, papules, erythema nodosum, purpura, nail pitting, onycholysis, and obvious psoriasis lesion. Neurological examination done for alertness, speech, facial symmetry,  tone and power in upper and lower extremities, and gait. The joint examination is performed for swelling, tenderness, warmth, erythema, and range of motion in the following joints: DIPs, PIPs, MCPs, wrists, first CMC's, elbows, shoulders, hips, knees, ankles, feet; spine for range of motion and paraspinal muscles for tenderness. The salient normal / abnormal findings are appended.  She has Heberden nodes more prominent on the left hand such as index and middle, early squaring of the first CMC's, mild impingement shoulders bilaterally joint line tenderness in the knees worse on the right side there is no synovitis and palpable joints of upper and lower extremities she does not have dactylitis of digits or toes there is no clear evidence of enthesopathy.  She still has hypermobility such  as at the hip joints, and elbow joints, she is unable to touch her thumbs to the distal forearm.  She was just about able to touch the floor as she flexed at the lumbar spine.  She does have tenderness across trapezius along the paraspinal region and proximal upper and proximal lower extremities.    Labs / Imaging (select studies)     No results found for: PPDINDURATIO, PPDREDNESS, TBGOLT, RHF, CCPABG, URIC, [id number], [id number], ANTIDNA, ANTIDNAINT, CARIA, [id number], [id number], ENASM, ENASCL, JO1, ENASSA, ENASSB, CENTA, [id number], [id number], [id number]   Hemoglobin   Date Value Ref Range Status   08/30/2023 11.8 11.7 - 15.7 g/dL Final   05/28/2023 11.7 11.7 - 15.7 g/dL Final   05/17/2023 12.9 11.7 - 15.7 g/dL Final   05/21/2021 12.2 11.7 - 15.7 g/dL Final   03/22/2021 11.1 (L) 11.7 - 15.7 g/dL Final   12/07/2020 13.1 11.7 - 15.7 g/dL Final     Urea Nitrogen   Date Value Ref Range Status   08/30/2023 11.4 6.0 - 20.0 mg/dL Final   05/28/2023 13.0 6.0 - 20.0 mg/dL Final   05/17/2023 12.8 6.0 - 20.0 mg/dL Final   08/26/2022 7 7 - 30 mg/dL Final   08/25/2022 7 7 - 30 mg/dL Final   08/24/2022 8 7 - 30 mg/dL Final   05/21/2021 12 7 - 30 mg/dL Final   03/22/2021 13 7 - 30 mg/dL Final   12/07/2020 10 7 - 30 mg/dL Final     Creatinine   Date Value Ref Range Status   12/18/2019 1.0 0.52 - 1.04 mg/dL Final     Sed Rate   Date Value Ref Range Status   07/20/2020 27 0 - 30 mm/h Final     CRP Inflammation   Date Value Ref Range Status   08/25/2022 59.0 (H) 0.0 - 8.0 mg/L Final   07/29/2020 11.0 (H) 0.0 - 8.0 mg/L Final   07/20/2020 11.0 (H) 0.0 - 8.0 mg/L Final   12/07/2016 14.3 (H) 0.0 - 8.0 mg/L Final     AST   Date Value Ref Range Status   08/30/2023 34 0 - 45 U/L Final     Comment:     Reference intervals for this test were updated on 6/12/2023 to more accurately reflect our healthy population. There may be differences in the flagging of prior results with similar values performed with this method. Interpretation of those prior results can  be made in the context of the updated reference intervals.   05/28/2023 22 10 - 35 U/L Final   05/17/2023 19 10 - 35 U/L Final   05/21/2021 31 0 - 45 U/L Final   03/22/2021 36 0 - 45 U/L Final   12/07/2020 53 (H) 0 - 45 U/L Final     Albumin   Date Value Ref Range Status   08/30/2023 3.7 3.5 - 5.2 g/dL Final   05/28/2023 3.5 3.5 - 5.2 g/dL Final   05/17/2023 3.9 3.5 - 5.2 g/dL Final   08/24/2022 2.5 (L) 3.4 - 5.0 g/dL Final   05/09/2022 3.2 (L) 3.4 - 5.0 g/dL Final   11/15/2021 3.3 (L) 3.4 - 5.0 g/dL Final   05/21/2021 3.4 3.4 - 5.0 g/dL Final   03/22/2021 3.1 (L) 3.4 - 5.0 g/dL Final   12/07/2020 3.3 (L) 3.4 - 5.0 g/dL Final     Alkaline Phosphatase   Date Value Ref Range Status   08/30/2023 101 35 - 104 U/L Final   05/28/2023 99 35 - 104 U/L Final   05/17/2023 123 (H) 35 - 104 U/L Final   05/21/2021 176 (H) 40 - 150 U/L Final   03/22/2021 256 (H) 40 - 150 U/L Final   12/07/2020 244 (H) 40 - 150 U/L Final     ALT   Date Value Ref Range Status   08/30/2023 33 0 - 50 U/L Final     Comment:     Reference intervals for this test were updated on 6/12/2023 to more accurately reflect our healthy population. There may be differences in the flagging of prior results with similar values performed with this method. Interpretation of those prior results can be made in the context of the updated reference intervals.     05/28/2023 20 10 - 35 U/L Final   05/17/2023 9 (L) 10 - 35 U/L Final   05/21/2021 42 0 - 50 U/L Final   03/22/2021 61 (H) 0 - 50 U/L Final   12/07/2020 42 0 - 50 U/L Final          Immunization History     Immunization History   Administered Date(s) Administered    COVID-19 Bivalent 18+ (Moderna) 10/05/2022    COVID-19 Monovalent 18+ (Moderna) 03/15/2021, 04/12/2021, 09/13/2021, 04/28/2022    DTaP, Unspecified 12/26/2009    Flu, Unspecified 09/28/2018    Influenza Vaccine 18-64 (Flublok) 09/04/2020, 09/13/2021, 10/05/2022    Influenza Vaccine >6 months (Alfuria,Fluzone) 09/09/2015, 08/31/2016, 09/14/2017,  09/28/2018, 10/01/2019    Influenza,INJ,MDCK,PF,Quad >6mo(Flucelvax) 09/28/2018    Pneumococcal 23 valent 07/29/2010    TD,PF 7+ (Tenivac) 05/01/2006    TDAP (Adacel,Boostrix) 12/26/2009, 06/14/2021    Td (Adult), Adsorbed 01/01/1996

## 2023-09-06 LAB
ALBUMIN SERPL BCG-MCNC: 4.1 G/DL (ref 3.5–5.2)
ALP SERPL-CCNC: 100 U/L (ref 35–104)
ALT SERPL W P-5'-P-CCNC: 19 U/L (ref 0–50)
ANION GAP SERPL CALCULATED.3IONS-SCNC: 9 MMOL/L (ref 7–15)
AST SERPL W P-5'-P-CCNC: 28 U/L (ref 0–45)
BILIRUB SERPL-MCNC: 0.2 MG/DL
BUN SERPL-MCNC: 8.1 MG/DL (ref 6–20)
CALCIUM SERPL-MCNC: 8.7 MG/DL (ref 8.6–10)
CHLORIDE SERPL-SCNC: 106 MMOL/L (ref 98–107)
CREAT SERPL-MCNC: 0.93 MG/DL (ref 0.51–0.95)
DEPRECATED HCO3 PLAS-SCNC: 27 MMOL/L (ref 22–29)
GFR SERPL CREATININE-BSD FRML MDRD: 72 ML/MIN/1.73M2
GLUCOSE SERPL-MCNC: 96 MG/DL (ref 70–99)
POTASSIUM SERPL-SCNC: 3.9 MMOL/L (ref 3.4–5.3)
PROT SERPL-MCNC: 6.8 G/DL (ref 6.4–8.3)
RHEUMATOID FACT SER NEPH-ACNC: <6 IU/ML
SODIUM SERPL-SCNC: 142 MMOL/L (ref 136–145)
URATE SERPL-MCNC: 4.6 MG/DL (ref 2.4–5.7)

## 2023-09-06 NOTE — PROGRESS NOTES
Time: 6:45-2:30  Reason for Admit: malignant neoplasm of overlapping sites/subtotal gastrectomy  Neuro: WDL  Activity: dangle at side of bed, ambulate within room  Pain: left upper quadrant of abdomen; Pt advanced from PCA to oral; pain diminished with oral medication  Cardiac: WDL  Resp: WDL  Gi/Gu: abdominal sounds hypoactive, gu WDL  Diet: full liquids  Lines/drains: JAZZMINE, Port A Cath, PIV  Skin: WDL  Events/Plans: continue with ambulation, progress diet to normal, bowel function promotion   How Severe Is Your Skin Lesion?: mild Has Your Skin Lesion Been Treated?: not been treated Is This A New Presentation, Or A Follow-Up?: Skin Lesions

## 2023-09-07 LAB — CCP AB SER IA-ACNC: 1.2 U/ML

## 2023-09-12 ENCOUNTER — TELEPHONE (OUTPATIENT)
Dept: PHYSICAL MEDICINE AND REHAB | Facility: CLINIC | Age: 58
End: 2023-09-12
Payer: MEDICARE

## 2023-09-12 DIAGNOSIS — M47.816 LUMBAR FACET ARTHROPATHY: Primary | ICD-10-CM

## 2023-09-12 DIAGNOSIS — M47.816 LUMBAR SPONDYLOSIS: ICD-10-CM

## 2023-09-12 NOTE — TELEPHONE ENCOUNTER
Patient is scheduled for surgery with Dr. Urbina    Spoke with: patient    Date of Surgery: 09/27/23    [place] [facility] [place] they will need an adult  yes    Additional comments: Patient is aware of date and time of the procedure.        Rachael Pitts MA on 9/12/2023 at 3:53 PM

## 2023-09-12 NOTE — PROGRESS NOTES
Imaging reviewed.  Case request placed for BILATERAL L4-5, L5-S1 medial branch block #1   Note: Partial sacralization of L5 on the left.

## 2023-09-15 ENCOUNTER — LAB (OUTPATIENT)
Dept: LAB | Facility: CLINIC | Age: 58
End: 2023-09-15
Payer: MEDICARE

## 2023-09-15 PROCEDURE — 250N000011 HC RX IP 250 OP 636: Mod: JZ | Performed by: INTERNAL MEDICINE

## 2023-09-15 RX ORDER — HEPARIN SODIUM (PORCINE) LOCK FLUSH IV SOLN 100 UNIT/ML 100 UNIT/ML
5 SOLUTION INTRAVENOUS ONCE
Status: COMPLETED | OUTPATIENT
Start: 2023-09-15 | End: 2023-09-15

## 2023-09-15 RX ADMIN — Medication 5 ML: at 11:16

## 2023-09-15 NOTE — NURSING NOTE
Chief Complaint   Patient presents with    Blood Draw    Labs Only     Port heparin flush by lab RN     Roxana Guallpa, RN

## 2023-09-19 DIAGNOSIS — G89.3 CHRONIC PAIN DUE TO NEOPLASM: ICD-10-CM

## 2023-09-19 DIAGNOSIS — M79.2 NEUROPATHIC PAIN: ICD-10-CM

## 2023-09-19 RX ORDER — OXYCODONE HYDROCHLORIDE 5 MG/1
5 TABLET ORAL 3 TIMES DAILY PRN
Qty: 90 TABLET | Refills: 0 | Status: SHIPPED | OUTPATIENT
Start: 2023-09-20 | End: 2023-10-17

## 2023-09-19 NOTE — TELEPHONE ENCOUNTER
Received voicemail from patient requesting refill of oxycodone.     Last refill: 8/23/23  Last office visit: 8/16/23  Scheduled for follow up 11/8/23     Will route request to MD for review.     Reviewed [place]  Report.

## 2023-09-20 DIAGNOSIS — M62.838 MUSCLE SPASM: ICD-10-CM

## 2023-09-20 RX ORDER — CYCLOBENZAPRINE HCL 10 MG
10 TABLET ORAL 3 TIMES DAILY PRN
Qty: 90 TABLET | Refills: 3 | Status: SHIPPED | OUTPATIENT
Start: 2023-09-20 | End: 2024-06-07

## 2023-09-20 NOTE — TELEPHONE ENCOUNTER
Received electronic message from pharmacy requesting a new prescription for flexeril.    Last office visit: 8/16/23  Scheduled for follow up 11/8/23     Will route request to MD for review.

## 2023-09-21 DIAGNOSIS — M25.532 LEFT WRIST PAIN: Primary | ICD-10-CM

## 2023-09-27 ENCOUNTER — ANCILLARY PROCEDURE (OUTPATIENT)
Dept: RADIOLOGY | Facility: AMBULATORY SURGERY CENTER | Age: 58
End: 2023-09-27
Attending: PHYSICAL MEDICINE & REHABILITATION
Payer: MEDICARE

## 2023-09-27 ENCOUNTER — HOSPITAL ENCOUNTER (OUTPATIENT)
Facility: AMBULATORY SURGERY CENTER | Age: 58
Discharge: HOME | End: 2023-09-27
Attending: PHYSICAL MEDICINE & REHABILITATION | Admitting: PHYSICAL MEDICINE & REHABILITATION
Payer: MEDICARE

## 2023-09-27 VITALS
HEART RATE: 82 BPM | TEMPERATURE: 97.9 F | DIASTOLIC BLOOD PRESSURE: 58 MMHG | HEIGHT: 69 IN | RESPIRATION RATE: 14 BRPM | WEIGHT: 177 LBS | BODY MASS INDEX: 26.22 KG/M2 | SYSTOLIC BLOOD PRESSURE: 114 MMHG | OXYGEN SATURATION: 96 %

## 2023-09-27 DIAGNOSIS — M47.816 LUMBAR ARTHROPATHY: ICD-10-CM

## 2023-09-27 PROBLEM — Z90.49 H/O WHIPPLE PROCEDURE: Status: ACTIVE | Noted: 2023-01-03

## 2023-09-27 PROBLEM — Z90.410 H/O WHIPPLE PROCEDURE: Status: ACTIVE | Noted: 2023-01-03

## 2023-09-27 PROCEDURE — 64493 INJ PARAVERT F JNT L/S 1 LEV: CPT | Mod: LT,KX

## 2023-09-27 RX ORDER — IOPAMIDOL 408 MG/ML
INJECTION, SOLUTION INTRATHECAL PRN
Status: DISCONTINUED | OUTPATIENT
Start: 2023-09-27 | End: 2023-09-27 | Stop reason: HOSPADM

## 2023-09-27 NOTE — OP NOTE
PROCEDURE NOTE: 1ST Diagnostic Lumbar Medial Branch Block    PROCEDURE DATE: 9/27/2023    PATIENT NAME: Nathalie Bashir  YOB: 1965    ATTENDING PHYSICIAN: Maria Victoria Urbina MD   RESIDENT/FELLOW PHYSICIAN: None    PREOPERATIVE DIAGNOSIS:   Other lumbar spondylosis without myelopathy   Lumbar facet pain  POSTOPERATIVE DIAGNOSIS: same    OPERATION PERFORMED: 1st diagnostic medial branch block at the Bilateral L4, L5, Sacral ala to treat the Bilateral L4-5, L5-S1 facet joints (L3, L4 medial branch nerves and L5 dorsal rami)     FLUOROSCOPY WAS USED.     INDICATIONS FOR PROCEDURE:   Nathalie Bashir is a 57 year old female with a clinical picture consistent with bilateral axial low back pain and lumbar facet arthropathy who presents for diagnostic medial branch block at the levels noted above to assess if there is a facetogenic component to her low back pain.     PROCEDURE AND FINDINGS:    Nathalie was greeted in the pre-procedure holding area. The risk, benefits and alternatives to the procedure were again reviewed with the patient and written informed consent was placed in the chart. An IV line was not placed.  A 500 mL bag of NS was not connected to the patient. She was taken to the procedure room and positioned prone on the fluoroscopy table.  Routine monitors were applied including blood pressure cuff, and pulse oximetry. Prior to the procedure a time out was completed, verifying correct patient, procedure, site, positioning, and implants and/or special equipment.     The skin was prepped and draped in the usual sterile fashion. An AP fluoroscopic view was obtained to identify the vertebral bodies, the transverse processes and the superior articulating processes at the L4, L5, Sacral ala aforementioned levels on the bilateral sides. The skin overlying the junction of the TP-SAP at the aforementioned levels was anesthetized using a 27-gauge 1-1/4 inch needle with 1% preservative-free lidocaine for a  total volume of 1 ml per level. Next, a 25-gauge 3 1/2 inch Quincke spinal needle was advanced under an oblique fluoroscopic view to the junction of the superior articular process and transverse process(es). Correct needle position was then confirmed in the AP and lateral views.      After negative aspiration, 0.3 mls of Isovue-M 200 contrast was injected at each site under live fluoroscopy; no vascular run off was identified and the contrast spread was adequate. At this point, after negative aspiration, 0.5mL of 0.5% Bupivacaine, was injected at each site.  The needle was then re-styletted removed. The needle insertion site was dressed appropriately.     Nathalie was taken to the recovery room where she was monitored for a brief period of time. She tolerated the procedure well and was discharged home in stable condition with post procedural instructions.    Before the procedure, she reported a pain score of 7/10.     Follow-up will be Determined after pain diary/ block sheet reviewed.    COMPLICATIONS: None    COMMENTS: None

## 2023-09-27 NOTE — DISCHARGE INSTRUCTIONS
"Home Care Instructions after a Medial Branch Block  In a medial branch block, a local anesthetic (numbing medicine) is injected near the medial branch nerve. This stops the transmission of pain signals from the facet joint. If this reduces your pain and improves your mobility, it may tell the doctor which facet joint is causing the pain. This procedure is a diagnostic procedure and is typically short lasting. With this injection, a steroid to increase the longevity of the blocks effect may or may not be used.    Activity  -You may resume most normal activity levels with the exception of strenuous activity. It is important for us to know if your pain with normal activity is relieved after this injection.  -DO NOT shower for 24 hours  -DO NOT remove bandaid for 24 hours    Pain  -You may experience soreness at the injection site for one or two days  -You may use an ice pack for 20 minutes every 2 hours for the first 24 hours  -You may use a heating pad after the first 24 hours  -You may use Tylenol (acetaminophen) every 4 hours or other pain medicines as     directed by your physician    You may experience numbness radiating into your legs or arms (depending on the procedure location). This numbness may last several hours. Until sensation returns to normal; please use caution in walking, climbing stairs, and stepping out of your vehicle, etc.    DID YOU RECEIVE STEROIDS TODAY?  {YES / NO:289128::\"Yes\"}    Common side effects of steroids:  Not everyone will experience corticosteroid side effects. If side effects are experienced, they will gradually subside in the 7-10 day period following an injection. Most common side effects include:  -Flushed face and/or chest  -Feeling of warmth, particularly in the face but could be an overall feeling of warmth  -Increased blood sugar in diabetic patients  -Menstrual irregularities my occur. If taking hormone-based birth control an alternate method of birth control is " recommended  -Sleep disturbances and/or mood swings are possible  -Leg cramps    PLEASE KEEP TRACK OF YOUR SYMPTOMS AND NOTE YOUR IMPROVEMENT FOR YOUR DOCTOR.     Fill out the pain diary and fax it back to us. You do not need to call us if the pain diary was faxed. [phone] is the FAX number  If you do not have access to a fax machine, attach it to [employer].  You do not need to call us if the pain diary was attached to [employer].   If you cannot fax or send via [employer], then call our call center and request to speak with a nurse for follow up after a procedure     Please contact us if you have:  -Severe pain  -Fever more than 101.5 degrees Fahrenheit  -Signs of infection at the injection site (redness, swelling, or drainage)  FOR PM&R PATIENTS:  For patients seen by the PM and R service, please call [phone]. (Monday through Friday 8a-5p.  After business hours and weekends call [phone] and ask for the PM and R doctor on call). If you need to fax a pain diary to PM&R the fax number is [phone]. If you are unable to fax, uploading to [employer] is encouraged, then send to provider. If you have procedure scheduling questions please call [phone] Option #2.

## 2023-09-29 ENCOUNTER — TELEPHONE (OUTPATIENT)
Dept: UROLOGY | Facility: CLINIC | Age: 58
End: 2023-09-29
Payer: MEDICARE

## 2023-09-29 NOTE — TELEPHONE ENCOUNTER
[facility] Call Center    Phone Message    May a detailed message be left on voicemail: yes     Reason for Call: Other: pt calling and sure she has a bladder infection, she is looking lab orders for urine culture, please advise     Action Taken: Other: urology    Travel Screening: Not Applicable

## 2023-10-01 ENCOUNTER — OFFICE VISIT (OUTPATIENT)
Dept: FAMILY MEDICINE | Facility: CLINIC | Age: 58
End: 2023-10-01
Payer: MEDICARE

## 2023-10-01 VITALS
RESPIRATION RATE: 12 BRPM | TEMPERATURE: 98.9 F | OXYGEN SATURATION: 98 % | HEART RATE: 101 BPM | SYSTOLIC BLOOD PRESSURE: 105 MMHG | DIASTOLIC BLOOD PRESSURE: 72 MMHG

## 2023-10-01 DIAGNOSIS — R35.0 URINARY FREQUENCY: ICD-10-CM

## 2023-10-01 DIAGNOSIS — N30.00 ACUTE CYSTITIS WITHOUT HEMATURIA: Primary | ICD-10-CM

## 2023-10-01 LAB
ALBUMIN UR-MCNC: 30 MG/DL
APPEARANCE UR: CLEAR
BACTERIA #/AREA URNS HPF: ABNORMAL /HPF
BILIRUB UR QL STRIP: NEGATIVE
COLOR UR AUTO: YELLOW
GLUCOSE UR STRIP-MCNC: 100 MG/DL
HGB UR QL STRIP: ABNORMAL
HYALINE CASTS #/AREA URNS LPF: ABNORMAL /LPF
KETONES UR STRIP-MCNC: NEGATIVE MG/DL
LEUKOCYTE ESTERASE UR QL STRIP: ABNORMAL
NITRATE UR QL: POSITIVE
PH UR STRIP: 6 [PH] (ref 5–8)
RBC #/AREA URNS AUTO: ABNORMAL /HPF
SP GR UR STRIP: 1.02 (ref 1–1.03)
SQUAMOUS #/AREA URNS AUTO: ABNORMAL /LPF
UROBILINOGEN UR STRIP-ACNC: 0.2 E.U./DL
WBC #/AREA URNS AUTO: ABNORMAL /HPF

## 2023-10-01 PROCEDURE — 87086 URINE CULTURE/COLONY COUNT: CPT | Performed by: STUDENT IN AN ORGANIZED HEALTH CARE EDUCATION/TRAINING PROGRAM

## 2023-10-01 PROCEDURE — 99213 OFFICE O/P EST LOW 20 MIN: CPT | Performed by: STUDENT IN AN ORGANIZED HEALTH CARE EDUCATION/TRAINING PROGRAM

## 2023-10-01 PROCEDURE — 81001 URINALYSIS AUTO W/SCOPE: CPT | Performed by: STUDENT IN AN ORGANIZED HEALTH CARE EDUCATION/TRAINING PROGRAM

## 2023-10-01 RX ORDER — CEFDINIR 300 MG/1
300 CAPSULE ORAL 2 TIMES DAILY
Qty: 14 CAPSULE | Refills: 0 | Status: SHIPPED | OUTPATIENT
Start: 2023-10-01 | End: 2023-10-08

## 2023-10-01 RX ORDER — CEFDINIR 300 MG/1
300 CAPSULE ORAL 2 TIMES DAILY
Qty: 14 CAPSULE | Refills: 0 | Status: SHIPPED | OUTPATIENT
Start: 2023-10-01 | End: 2023-10-01

## 2023-10-01 NOTE — PROGRESS NOTES
ASSESSMENT & PLAN:   Diagnoses and all orders for this visit:  Acute cystitis without hematuria  -     cefdinir (OMNICEF) 300 MG capsule; Take 1 capsule (300 mg) by mouth 2 times daily for 7 days  Urinary frequency  -     UA Macroscopic with reflex to Microscopic and Culture - Clinic Collect  -     Urine Microscopic Exam  -     Urine Culture    UTI symptoms x4 days. UA positive with nitrates and pyuria. Will treat with cefdinir x7 days. Urine culture pending.    No follow-ups on file.    There are no Patient Instructions on file for this visit.    At the end of the encounter, I discussed results, diagnosis, medications. Discussed red flags for immediate return to clinic/ER, as well as indications for follow up if no improvement. Patient and/or caregiver understood and agreed to plan. Patient was stable for discharge.    ------------------------------------------------------------------------  SUBJECTIVE  Patient presents with:  Urinary Problem    HPI  Nathalie Ludy Bashir is a(n) 57 year old female presenting to urgent care for UTI symptoms x 4 days. Reports dysuria, frequency, dark and cloudy urine. No fever or chills. No vaginal itching or changes in discharge    Review of Systems    Current Outpatient Medications   Medication Sig Dispense Refill    cefdinir (OMNICEF) 300 MG capsule Take 1 capsule (300 mg) by mouth 2 times daily for 7 days 14 capsule 0    amitriptyline (ELAVIL) 75 MG tablet Take 1 tablet (75 mg) by mouth At Bedtime 30 tablet 3    citalopram (CELEXA) 20 MG tablet Take 1.5 tablets (30 mg) by mouth daily 135 tablet 0    COMPOUNDED NON-CONTROLLED SUBSTANCE (CMPD RX) - PHARMACY TO MIX COMPOUNDED MEDICATION Estriol 1 mg/gram,place 1 gram vaginally three times per week,42.5 grams 1 each 3    Cyanocobalamin 1000 MCG CAPS Take 1 tablet by mouth every morning      cyclobenzaprine (FLEXERIL) 10 MG tablet TAKE 1 TABLET BY MOUTH THREE TIMES A DAY AS NEEDED FOR MUSCLE SPASMS 90 tablet 3    famotidine (PEPCID) 20  MG tablet TAKE 1 TABLET (20 MG) BY MOUTH 2 TIMES DAILY AS NEEDED 180 tablet 1    fluticasone (FLONASE) 50 MCG/ACT nasal spray Spray 1 spray into both nostrils daily at 2 pm      hydrOXYzine (VISTARIL) 25 MG capsule Take 25-50 mg by mouth 4 times daily as needed for itching or anxiety      loperamide (IMODIUM) 2 MG capsule Take 4 mg by mouth 4 times daily as needed for diarrhea      loratadine-pseudoePHEDrine (CLARITIN-D 24-HOUR)  MG 24 hr tablet Take 1 tablet by mouth daily as needed for allergies Usually takes in Spring/Fall      ondansetron (ZOFRAN) 8 MG tablet Take 1 tablet (8 mg) by mouth every 8 hours as needed (Nausea/Vomiting) 90 tablet 2    oxyCODONE (ROXICODONE) 5 MG tablet Take 1 tablet (5 mg) by mouth 3 times daily as needed for breakthrough pain or moderate to severe pain Maximum 3 pills/day. 90 tablet 0    pantoprazole (PROTONIX) 40 MG EC tablet TAKE 1 TABLET BY MOUTH TWICE A  tablet 1    sennosides (SENOKOT) 8.6 MG tablet 1-2 tablets 1-2 times daily as needed. 120 tablet 1    simethicone 80 MG TABS Take 1 tablet by mouth every 6 hours as needed (bloating, gas, belching) 90 tablet 3    sulfamethoxazole-trimethoprim (BACTRIM DS) 800-160 MG tablet Take 1 tablet by mouth 2 times daily (Patient not taking: Reported on 9/5/2023) 10 tablet 0    SUMAtriptan (IMITREX) 100 MG tablet Take 100 mg by mouth at onset of headache for migraine       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)  2022-08: Pyelonephritis  2022-04: Chemotherapy-induced neuropathy   2022-04: Bilateral hand pain  2021-01: History of gastric surgery  2020-11: Dysphagia  2020-11: Nausea & vomiting  2020-10: Paralytic ileus (H)  2020-10: Hypokalemia  2020-10:  Anemia  2020-10: Iron deficiency anemia  2020-09: Malignant neoplasm of overlapping sites of stomach (H)  2020-08: Abdominal pain  2020-08: Colitis  2020-08: Jejunitis  2020-07: Thrombocytopenia (H24)  2020-07: Urinary tract infection  2020-07: Crohn's disease of small intestine (H)  2020-07: Cholangitis (H28)  2020-07: Gastric cancer (H)  2020-06: Chemotherapy-induced neutropenia   2020-06: Mucositis  2020-05: Pneumonia  2020-02: Gastric adenocarcinoma (H)  2020-02: Intra-abdominal fluid collection  2020-02: Bacteremia due to Gram-negative bacteria  2020-02: Malnutrition (H24)  2020-02: Leukocytosis  2020-01: Low urine output  2020-01: Hypophosphatemia  2020-01: Long term (current) use of opiate analgesic  2020-01: Bursitis  2020-01: Depression  2020-01: Sacroiliitis (H24)  2020-01: IPMN (intraductal papillary mucinous neoplasm)  2018-08: Chronic neck pain  2018-06: Migraine with aura  2018-04: Unilateral inguinal hernia without obstruction or gangrene  2017-08: Chronic bilateral low back pain without sciatica  2017-08: Cervical radiculopathy, chronic  2017-06: Controlled substance agreement signed  2016-10: Infected prosthesis of left hip (H24)  2016-10: Status post left hip replacement  2016-09: Screening for malignant neoplasm of cervix  2016-08: Chronic pain disorder  2016-08: Continuous opioid dependence (H)  2016-08: Degeneration of intervertebral disc of lumbosacral region  2016-08: Spondylosis of lumbosacral spine without myelopathy  2015-10: Arthralgia of hip  2015-10: Greater trochanteric bursitis of left hip  2014-12: Lower limb length difference  2008-12: Debility  2008-11: MDD (major depressive disorder), recurrent, in partial   remission (H24)  2008-04: Disorder of sacrum  2006-12: Allergic rhinitis    Allergies   Allergen Reactions    Gluten Meal Palpitations    Amoxicillin-Pot Clavulanate Rash     Patient tolerated Zosyn in 7/2020    Oxycontin [Oxycodone] Other (See Comments)     Confusion, loopy,  oxycodone is tolerated.    Pt states she tolerates regular oxycodone, cannot take 12 hour oxycontin.      Pregabalin      interfered with Cymbalta    Topiramate      Tongue numbness, taste alteration, irritable     Acetaminophen Nausea     Urine retention      Dust Mite Extract     Gabapentin Dizziness and GI Disturbance    Ketorolac Headache    Latex Rash    Methadone Fatigue    Mold     Naprosyn [Naproxen] Dizziness and GI Disturbance    Seasonal Allergies          OBJECTIVE  Vitals:    10/01/23 1042   BP: 105/72   Pulse: 101   Resp: 12   Temp: 98.9  F (37.2  C)   TempSrc: Tympanic   SpO2: 98%     Physical Exam   GENERAL: healthy, alert, no acute distress.   PSYCH: mentation appears normal. Normal affect  ABDOMEN: soft, nondistended, nontender. No guarding or rebound tenderness. No CVA tenderness bilaterally    Results for orders placed or performed in visit on 10/01/23   UA Macroscopic with reflex to Microscopic and Culture - Clinic Collect     Status: Abnormal    Specimen: Urine, Clean Catch   Result Value Ref Range    Color Urine Yellow Colorless, Straw, Light Yellow, Yellow    Appearance Urine Clear Clear    Glucose Urine 100 (A) Negative mg/dL    Bilirubin Urine Negative Negative    Ketones Urine Negative Negative mg/dL    Specific Gravity Urine 1.020 1.005 - 1.030    Blood Urine Trace (A) Negative    pH Urine 6.0 5.0 - 8.0    Protein Albumin Urine 30 (A) Negative mg/dL    Urobilinogen Urine 0.2 0.2, 1.0 E.U./dL    Nitrite Urine Positive (A) Negative    Leukocyte Esterase Urine Moderate (A) Negative   Urine Microscopic Exam     Status: Abnormal   Result Value Ref Range    Bacteria Urine Moderate (A) None Seen /HPF    RBC Urine 0-2 0-2 /HPF /HPF    WBC Urine 25-50 (A) 0-5 /HPF /HPF    Squamous Epithelials Urine Few (A) None Seen /LPF    Hyaline Casts Urine 0-2 (A) None Seen /LPF

## 2023-10-02 LAB — BACTERIA UR CULT: NORMAL

## 2023-10-02 NOTE — TELEPHONE ENCOUNTER
Called patient but she went into urgent care for urine and received antibiotics.  Patient states she feels better since she has been taking cipro    Samreen Rizo, RN, BSN  Care Coordinator Urology  [facility], [place]  Urology [facility]  [phone]

## 2023-10-17 ENCOUNTER — OFFICE VISIT (OUTPATIENT)
Dept: RHEUMATOLOGY | Facility: CLINIC | Age: 58
End: 2023-10-17

## 2023-10-17 VITALS
OXYGEN SATURATION: 98 % | WEIGHT: 177 LBS | SYSTOLIC BLOOD PRESSURE: 104 MMHG | HEART RATE: 101 BPM | DIASTOLIC BLOOD PRESSURE: 64 MMHG | BODY MASS INDEX: 26.14 KG/M2

## 2023-10-17 DIAGNOSIS — M54.50 CHRONIC BILATERAL LOW BACK PAIN WITHOUT SCIATICA: ICD-10-CM

## 2023-10-17 DIAGNOSIS — M25.50 MULTIPLE JOINT PAIN: Primary | ICD-10-CM

## 2023-10-17 DIAGNOSIS — M47.816 LUMBAR SPONDYLOSIS: ICD-10-CM

## 2023-10-17 DIAGNOSIS — G89.3 CHRONIC PAIN DUE TO NEOPLASM: ICD-10-CM

## 2023-10-17 DIAGNOSIS — M79.2 NEUROPATHIC PAIN: ICD-10-CM

## 2023-10-17 DIAGNOSIS — C16.9 GASTRIC ADENOCARCINOMA (H): ICD-10-CM

## 2023-10-17 DIAGNOSIS — G89.29 CHRONIC BILATERAL LOW BACK PAIN WITHOUT SCIATICA: ICD-10-CM

## 2023-10-17 PROCEDURE — 99214 OFFICE O/P EST MOD 30 MIN: CPT | Performed by: INTERNAL MEDICINE

## 2023-10-17 RX ORDER — OXYCODONE HYDROCHLORIDE 5 MG/1
5 TABLET ORAL 3 TIMES DAILY PRN
Qty: 90 TABLET | Refills: 0 | Status: SHIPPED | OUTPATIENT
Start: 2023-10-18 | End: 2023-11-17

## 2023-10-17 NOTE — TELEPHONE ENCOUNTER
Received voicemail from patient requesting refill of oxycodone.     Last refill: 9/20/23  Last office visit: 8/16/23  Scheduled for follow up 11/8/23     Will route request to MD for review.     Reviewed [place]  Report.

## 2023-10-17 NOTE — PROGRESS NOTES
Rheumatology follow-up visit note     Nathalie is a 58 year old female presents today for follow-up.    Nathalie was seen today for recheck.    Diagnoses and all orders for this visit:    Multiple joint pain    Chronic bilateral low back pain without sciatica    Gastric adenocarcinoma (H)    Lumbar spondylosis        This patient with osteoarthritis in the background of multiple comorbidities as outlined at the options for pharmacologic and nonpharmacologic measures.  She is going to continue to strengthen her core musculature.  Acetaminophen as needed.  She would does not suffice consider corticosteroid injection in the knee joints.  She is to follow-up as needed.       HPI    Nathalie KATTY Bashir is a 58 year old female is here for follow-up of polyarthralgias.  She reports longstanding history of painful joints.  She has had hip replacement in 2015.  This was unfortunately complicated by a staph infection that needed revision she was homebound for 3 months.  She has lumbar spondylosis, she is status post left SI joint fusion.  Recent x-rays of the knees show early osteoarthritis changes with osteophyte formation although the x-ray report noted otherwise.  Following is the excerpt from a previous note:   She was told around that time that she is still hyperflexible which she recalls was the case for example she did splits on her 51th birthday.  She has noted painful joints in the hands especially the left hand pointing the DIPs and right upper extremity pain especially in the right shoulder, worse with day-to-day activities such as vacuuming.  Even before her's gastric cancer chemotherapy surgery, the details of some of the events are noted in the oncology note excerpt attached below, she recalls that she had almost given up on vacuuming because of the right shoulder pain.  Should she lift something heavy the pain gets worse.  If she sleeps on that side the pain can wake her up.  The symptoms were there prior to  her chemotherapy for gastric malignancy for which she underwent chemotherapy in 2020 this was followed by gastric surgery subsequent to which she was told by her surgeons not to take nonsteroidals.  She does not even take acetaminophen..  She has noted neuropathy affecting lower extremities since the chemotherapy.  Several years ago she had sacroiliac joint surgery it sounds a fusion was done.  This was in 2008.  She reports no personal or family history of psoriasis ulcerative colitis Crohn's disease there is no family history of lupus rheumatoid arthritis or ankylosing spondylitis.       DETAILED EXAMINATION  10/17/23  :    Vitals:    10/17/23 1120 10/17/23 1127   BP: 92/64 104/64   BP Location: Right arm Left arm   Pulse: 101    SpO2: 98%    Weight: 80.3 kg (177 lb)      Alert oriented. Head including the face is examined for malar rash, heliotropes, scarring, lupus pernio. Eyes examined for redness such as in episcleritis/scleritis, periorbital lesions.   Neck/ Face examined for parotid gland swelling, range of motion of neck.  Left upper and lower and right upper and lower extremities examined for tenderness, swelling, warmth of the appendicular joints, range of motion, edema, rash.  Some of the important findings included: she does not have evidence of synovitis in the palpable joints of the upper extremities.  No significant deformities of the digits.  No effusion or warmth of the knee joints on either side.  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) 08/24/2022     Priority: Medium     Pyelonephritis 08/23/2022     Priority: Medium    Chemotherapy-induced neuropathy  04/04/2022     Priority: Medium    Bilateral hand pain 04/04/2022     Priority: Medium    History of gastric surgery 01/08/2021     Priority: Medium     Added automatically from request for surgery [id number]      Dysphagia 11/23/2020     Priority: Medium     Added automatically from request for surgery [id number]      Nausea & vomiting 11/05/2020     Priority: Medium    Paralytic ileus (H) 10/26/2020     Priority: Medium    Hypokalemia 10/26/2020     Priority: Medium    Anemia 10/23/2020     Priority: Medium    Iron deficiency anemia 10/08/2020     Priority: Medium    Malignant neoplasm of overlapping sites of stomach (H) 09/08/2020     Priority: Medium     Added automatically from request for surgery [id number]      Abdominal pain 08/23/2020     Priority: Medium    Colitis 08/21/2020     Priority: Medium    Jejunitis 08/13/2020     Priority: Medium     Added automatically from request for surgery [id number]      Thrombocytopenia (H24) 07/21/2020     Priority: Medium    Urinary tract infection 07/21/2020     Priority: Medium    Crohn's disease of small intestine (H) 07/21/2020     Priority: Medium    Cholangitis (H28) 07/20/2020     Priority: Medium    Gastric cancer (H) 07/17/2020     Priority: Medium     Added automatically from request for surgery [id number]      Chemotherapy-induced neutropenia  06/29/2020     Priority: Medium    Mucositis 06/15/2020     Priority: Medium    Pneumonia 05/21/2020     Priority: Medium    Gastric adenocarcinoma (H) 02/19/2020     Priority: Medium    Intra-abdominal fluid collection 02/10/2020     Priority: Medium    Bacteremia due to Gram-negative bacteria 02/10/2020     Priority: Medium    Malnutrition (H24) 02/10/2020     Priority: Medium    Leukocytosis 02/04/2020     Priority: Medium    Low urine output 01/30/2020     Priority: Medium    Hypophosphatemia 01/30/2020     Priority: Medium    Long term (current)  use of opiate analgesic 01/17/2020     Priority: Medium    Bursitis 01/15/2020     Priority: Medium     Created by Conversion      Depression 01/15/2020     Priority: Medium     Created by Conversion      Sacroiliitis (H24) 01/15/2020     Priority: Medium     Created by Conversion      IPMN (intraductal papillary mucinous neoplasm) 01/03/2020     Priority: Medium     Added automatically from request for surgery [id number]      Chronic neck pain 08/30/2018     Priority: Medium    Migraine with aura 06/21/2018     Priority: Medium     Created by Conversion    Replacement Utility updated for latest IMO load      Unilateral inguinal hernia without obstruction or gangrene 04/05/2018     Priority: Medium    Chronic bilateral low back pain without sciatica 08/18/2017     Priority: Medium    Cervical radiculopathy, chronic 08/18/2017     Priority: Medium    Controlled substance agreement signed 06/29/2017     Priority: Medium     Dr. Pinzon   [facility],.............[place] 6/29/2017 4:20 PM  Dr. Pinzon   [facility] in [facility],.............[place] 6/29/2017 4:20 PM      Infected prosthesis of left hip (H24) 10/08/2016     Priority: Medium    Status post left hip replacement 10/08/2016     Priority: Medium    Screening for malignant neoplasm of cervix 09/07/2016     Priority: Medium     Per visit note dated August 31 , 2016: Hx of abnormal paps: no  [id number] NILM  2016 NILM, hpv negative 50 y.o.  PLAN: Cotesting 8/2021    ; pap test history      Chronic pain disorder 08/24/2016     Priority: Medium     Overview:   Overview:   Created by Conversion  Created by Conversion      Continuous opioid dependence (H) 08/24/2016     Priority: Medium     Overview:   Overview:   Created by Conversion  Created by Conversion      Degeneration of intervertebral disc of lumbosacral region 08/24/2016     Priority: Medium     Overview:   Overview:   Created by Conversion  Created by Conversion       Spondylosis of lumbosacral spine without myelopathy 08/24/2016     Priority: Medium     Overview:   Overview:   Created by Conversion  Created by Conversion      Arthralgia of hip 10/16/2015     Priority: Medium    Greater trochanteric bursitis of left hip 10/16/2015     Priority: Medium    Lower limb length difference 12/23/2014     Priority: Medium    Debility 12/31/2008     Priority: Medium     Overview:   Epic       MDD (major depressive disorder), recurrent, in partial remission (H24) 11/26/2008     Priority: Medium    Disorder of sacrum 04/16/2008     Priority: Medium     Overview:   Epic       Allergic rhinitis 12/13/2006     Priority: Medium     Past Surgical History:   Procedure Laterality Date    ARTHROPLASTY HIP Left 2016    BACK SURGERY      L4-5 decompression    CATARACT IOL, RT/LT      CHOLECYSTECTOMY N/A 1/28/2020    Procedure: Cholecystectomy;  Surgeon: Yandel Mallory MD;  Location:  OR    ENDOSCOPIC RETROGRADE CHOLANGIOPANCREATOGRAM N/A 7/27/2020    Procedure: ENDOSCOPIC RETROGRADE CHOLANGIOPANCREATOGRAPHY  **Latex Allergy** with jejunal biopsies;  Surgeon: Jeet Aviles MD;  Location:  OR    ESOPHAGOSCOPY, GASTROSCOPY, DUODENOSCOPY (EGD), COMBINED N/A 3/3/2020    Procedure: ESOPHAGOGASTRODUODENOSCOPY, WITH ENDOSCOPIC US (enoxaparin);  Surgeon: Bakari Plata MD;  Location:  GI    ESOPHAGOSCOPY, GASTROSCOPY, DUODENOSCOPY (EGD), COMBINED N/A 12/7/2020    Procedure: Upper Endoscopy with stent placement;  Surgeon: Jeet Aviles MD;  Location:  OR    ESOPHAGOSCOPY, GASTROSCOPY, DUODENOSCOPY (EGD), COMBINED N/A 2/8/2021    Procedure: ESOPHAGOGASTRODUODENOSCOPY (EGD)AND STENT REMOVAL;  Surgeon: Jeet Aviles MD;  Location:  OR    EYE SURGERY      GASTRECTOMY N/A 10/21/2020    Procedure: Open subtotal gastectomy with David en Y Reconstruction; D1 Lymph Node Disection  **Latex Allergy**;  Surgeon: Yandel Mallory MD;  Location:  OR    INJECT BLOCK MEDIAL BRANCH  CERVICAL/THORACIC/LUMBAR Bilateral 9/27/2023    Procedure: BILATERAL L4-5, L5-S1 medial branch block #1;  Surgeon: Maria Victoria Urbina MD;  Location: [facility] OR    IR CHEST PORT PLACEMENT > 5 YRS OF AGE  3/26/2020    IR LUMBAR EPIDURAL STEROID INJECTION  8/25/2009    IR LUMBAR EPIDURAL STEROID INJECTION  9/2/2009    IR LUMBAR EPIDURAL STEROID INJECTION  9/25/2009    IR LUMBAR EPIDURAL STEROID INJECTION  11/23/2009    IR LUMBAR EPIDURAL STEROID INJECTION  4/1/2010    IR LUMBAR EPIDURAL STEROID INJECTION  9/1/2010    IR LUMBAR EPIDURAL STEROID INJECTION  3/10/2011    IR LUMBAR EPIDURAL STEROID INJECTION  8/30/2011    LAPAROSCOPY DIAGNOSTIC (GENERAL) N/A 10/13/2020    Procedure: Diagnostic laparoscopy with peritoneal washings  **Latex Allergy**;  Surgeon: Yandel Mallory MD;  Location: [facility] OR    OPEN REDUCTION INTERNAL FIXATION RODDING INTRAMEDULLARY FEMUR Left 12/23/2014    Procedure: OPEN REDUCTION INTERNAL FIXATION RODDING INTRAMEDULLARY FEMUR;  Surgeon: Anrol Santiago MD;  Location: [facility] OR    OPEN REDUCTION INTERNAL FIXATION WRIST Left 9/22/2022    Procedure: LEFT WRIST OPEN REDUCTION INTERNAL FIXATION, FRACTURE;  Surgeon: Mark Villalobos MD;  Location[place] [facility] [place]    ORTHOPEDIC SURGERY      PICC DOUBLE LUMEN PLACEMENT Right 02/07/2020    5 fr double lumen 41 cm    AZ LAMINEC/FACETECT/FORAMIN,LUMBAR 1 SEG      Description: Laminectomy Decompress, Facetectomy, Foraminotomy Lumbar Seg;  Recorded: 04/12/2012;    REMOVE HARDWARE LOWER EXTREMITY Left 4/7/2015    Procedure: REMOVE HARDWARE LOWER EXTREMITY;  Surgeon: Arnol Santiago MD;  Location: [facility] OR    REMOVE HARDWARE RODDING INTRAMEDULLARY FEMUR Left 12/17/2015    Procedure: REMOVE HARDWARE RODDING INTRAMEDULLARY FEMUR;  Surgeon: Arnol Santiago MD;  [facility]: [facility]    RESECT BONE LOWER EXTREMITY Left 12/23/2014    Procedure: RESECT BONE LOWER EXTREMITY;  Surgeon: Arnol Santiago MD;  Location: [facility]    SHORTENING OSTEOPLASTY FEMUR W/ IM NAILING      WHIPPLE PROCEDURE  N/A 1/28/2020    Procedure: Open Whipple procedure and Cholecystectomy;  Surgeon: Yandel Mallory MD;  Location: [facility] OR    WHIPPLE PROCEDURE      ZZC FUSION OF SACROILIAC JOINT      Description: Arthrodesis Sacroiliac Joint Left;  Recorded: 10/07/2011;    ZZC REPAIR DETACH RETINA,SCLERAL BUCKLE        Past Medical History:   Diagnosis Date    Allergic rhinitis     Cancer (H)     stomach cancer    Chronic pain     Closed fracture of distal end of left radius     Depression     Gastric adenocarcinoma (H)     Lumbago     Migraine     Opioid dependence (H)     Other chronic pain     low back    Sacroiliitis (H)     low back pain    Trochanteric bursitis     leg length discrrepancy, hip pain     Allergies   Allergen Reactions    Gluten Meal Palpitations    Amoxicillin-Pot Clavulanate Rash     Patient tolerated Zosyn in 7/2020    Oxycontin [Oxycodone] Other (See Comments)     Confusion, loopy, oxycodone is tolerated.    Pt states she tolerates regular oxycodone, cannot take 12 hour oxycontin.      Pregabalin      interfered with Cymbalta    Topiramate      Tongue numbness, taste alteration, irritable     Acetaminophen Nausea     Urine retention      Dust Mite Extract     Gabapentin Dizziness and GI Disturbance    Ketorolac Headache    Latex Rash    Methadone Fatigue    Mold     Naprosyn [Naproxen] Dizziness and GI Disturbance    Seasonal Allergies      Current Outpatient Medications   Medication Sig Dispense Refill    [START ON 10/18/2023] oxyCODONE (ROXICODONE) 5 MG tablet Take 1 tablet (5 mg) by mouth 3 times daily as needed for breakthrough pain or moderate to severe pain Maximum 3 pills/day. 90 tablet 0    amitriptyline (ELAVIL) 75 MG tablet Take 1 tablet (75 mg) by mouth At Bedtime 30 tablet 3    citalopram (CELEXA) 20 MG tablet Take 1.5 tablets (30 mg) by mouth daily 135 tablet 0    COMPOUNDED NON-CONTROLLED SUBSTANCE (CMPD RX) - PHARMACY TO MIX COMPOUNDED MEDICATION Estriol 1 mg/gram,place 1 gram vaginally three  times per week,42.5 grams 1 each 3    Cyanocobalamin 1000 MCG CAPS Take 1 tablet by mouth every morning      cyclobenzaprine (FLEXERIL) 10 MG tablet TAKE 1 TABLET BY MOUTH THREE TIMES A DAY AS NEEDED FOR MUSCLE SPASMS 90 tablet 3    famotidine (PEPCID) 20 MG tablet TAKE 1 TABLET (20 MG) BY MOUTH 2 TIMES DAILY AS NEEDED 180 tablet 1    fluticasone (FLONASE) 50 MCG/ACT nasal spray Spray 1 spray into both nostrils daily at 2 pm      hydrOXYzine (VISTARIL) 25 MG capsule Take 25-50 mg by mouth 4 times daily as needed for itching or anxiety      loperamide (IMODIUM) 2 MG capsule Take 4 mg by mouth 4 times daily as needed for diarrhea      loratadine-pseudoePHEDrine (CLARITIN-D 24-HOUR)  MG 24 hr tablet Take 1 tablet by mouth daily as needed for allergies Usually takes in Spring/Fall      ondansetron (ZOFRAN) 8 MG tablet Take 1 tablet (8 mg) by mouth every 8 hours as needed (Nausea/Vomiting) 90 tablet 2    pantoprazole (PROTONIX) 40 MG EC tablet TAKE 1 TABLET BY MOUTH TWICE A  tablet 1    sennosides (SENOKOT) 8.6 MG tablet 1-2 tablets 1-2 times daily as needed. 120 tablet 1    simethicone 80 MG TABS Take 1 tablet by mouth every 6 hours as needed (bloating, gas, belching) 90 tablet 3    sulfamethoxazole-trimethoprim (BACTRIM DS) 800-160 MG tablet Take 1 tablet by mouth 2 times daily (Patient not taking: Reported on 9/5/2023) 10 tablet 0    SUMAtriptan (IMITREX) 100 MG tablet Take 100 mg by mouth at onset of headache for migraine       family history includes Dementia in her father; Heart Failure (age of onset: 77) in her mother; No Known Problems in her brother and sister.  Social Connections: Not on file          WBC   Date Value Ref Range Status   05/21/2021 4.8 4.0 - 11.0 10e9/L Final     WBC Count   Date Value Ref Range Status   08/30/2023 6.1 4.0 - 11.0 10e3/uL Final     RBC Count   Date Value Ref Range Status   08/30/2023 3.89 3.80 - 5.20 10e6/uL Final   05/21/2021 3.90 3.8 - 5.2 10e12/L Final      Hemoglobin   Date Value Ref Range Status   08/30/2023 11.8 11.7 - 15.7 g/dL Final   05/21/2021 12.2 11.7 - 15.7 g/dL Final     Hematocrit   Date Value Ref Range Status   08/30/2023 36.9 35.0 - 47.0 % Final   05/21/2021 38.2 35.0 - 47.0 % Final     MCV   Date Value Ref Range Status   08/30/2023 95 78 - 100 fL Final   05/21/2021 98 78 - 100 fl Final     MCH   Date Value Ref Range Status   08/30/2023 30.3 26.5 - 33.0 pg Final   05/21/2021 31.3 26.5 - 33.0 pg Final     Platelet Count   Date Value Ref Range Status   08/30/2023 232 150 - 450 10e3/uL Final   05/21/2021 197 150 - 450 10e9/L Final     % Lymphocytes   Date Value Ref Range Status   08/30/2023 25 % Final   05/21/2021 32.0 % Final     AST   Date Value Ref Range Status   09/05/2023 28 0 - 45 U/L Final     Comment:     Reference intervals for this test were updated on 6/12/2023 to more accurately reflect our healthy population. There may be differences in the flagging of prior results with similar values performed with this method. Interpretation of those prior results can be made in the context of the updated reference intervals.   05/21/2021 31 0 - 45 U/L Final     ALT   Date Value Ref Range Status   09/05/2023 19 0 - 50 U/L Final     Comment:     Reference intervals for this test were updated on 6/12/2023 to more accurately reflect our healthy population. There may be differences in the flagging of prior results with similar values performed with this method. Interpretation of those prior results can be made in the context of the updated reference intervals.     05/21/2021 42 0 - 50 U/L Final     Albumin   Date Value Ref Range Status   09/05/2023 4.1 3.5 - 5.2 g/dL Final   08/24/2022 2.5 (L) 3.4 - 5.0 g/dL Final   05/21/2021 3.4 3.4 - 5.0 g/dL Final     Alkaline Phosphatase   Date Value Ref Range Status   09/05/2023 100 35 - 104 U/L Final   05/21/2021 176 (H) 40 - 150 U/L Final     Creatinine   Date Value Ref Range Status   09/05/2023 0.93 0.51 - 0.95 mg/dL  Final   05/21/2021 0.93 0.52 - 1.04 mg/dL Final     GFR Estimate   Date Value Ref Range Status   09/05/2023 72 >60 mL/min/1.73m2 Final   05/21/2021 70 >60 mL/min/[1.73_m2] Final     Comment:     Non  GFR Calc  Starting 12/18/2018, serum creatinine based estimated GFR (eGFR) will be   calculated using the Chronic Kidney Disease Epidemiology Collaboration   (CKD-EPI) equation.       GFR, ESTIMATED POCT   Date Value Ref Range Status   05/17/2023 >60 >60 mL/min/1.73m2 Final     GFR Estimate If Black   Date Value Ref Range Status   05/21/2021 81 >60 mL/min/[1.73_m2] Final     Comment:      GFR Calc  Starting 12/18/2018, serum creatinine based estimated GFR (eGFR) will be   calculated using the Chronic Kidney Disease Epidemiology Collaboration   (CKD-EPI) equation.       Sed Rate   Date Value Ref Range Status   07/20/2020 27 0 - 30 mm/h Final     CRP Inflammation   Date Value Ref Range Status   08/25/2022 59.0 (H) 0.0 - 8.0 mg/L Final   07/29/2020 11.0 (H) 0.0 - 8.0 mg/L Final     N-Terminal Pro BNP Inpatient   Date Value Ref Range Status   01/30/2020 412 0 - 900 pg/mL Final     Comment:        Reference range shown and results flagged as abnormal are suggested inpatient   cut points for confirming diagnosis if CHF in an acute setting. Establishing a   baseline value for each individual patient is useful for follow-up. An   inpatient or emergency department NT-proPBNP <300 pg/mL effectively rules out   acute CHF, with 99% negative predictive value.  The outpatient non-acute reference range for ruling out CHF is:   0-125 pg/mL (age 18 to less than 75)   0-450 pg/mL (age 75 yrs and older)

## 2023-10-19 ENCOUNTER — ONCOLOGY VISIT (OUTPATIENT)
Dept: ONCOLOGY | Facility: CLINIC | Age: 58
End: 2023-10-19
Attending: STUDENT IN AN ORGANIZED HEALTH CARE EDUCATION/TRAINING PROGRAM
Payer: MEDICARE

## 2023-10-19 VITALS
SYSTOLIC BLOOD PRESSURE: 117 MMHG | WEIGHT: 177 LBS | BODY MASS INDEX: 26.14 KG/M2 | HEART RATE: 104 BPM | TEMPERATURE: 98.4 F | OXYGEN SATURATION: 97 % | DIASTOLIC BLOOD PRESSURE: 78 MMHG | RESPIRATION RATE: 16 BRPM

## 2023-10-19 DIAGNOSIS — G62.0 PERIPHERAL NEUROPATHY DUE TO CHEMOTHERAPY (H): Primary | ICD-10-CM

## 2023-10-19 DIAGNOSIS — C16.3 MALIGNANT NEOPLASM OF PYLORIC ANTRUM (H): ICD-10-CM

## 2023-10-19 DIAGNOSIS — M62.838 MUSCLE SPASM: ICD-10-CM

## 2023-10-19 DIAGNOSIS — M79.2 NEUROPATHIC PAIN: ICD-10-CM

## 2023-10-19 DIAGNOSIS — T45.1X5A PERIPHERAL NEUROPATHY DUE TO CHEMOTHERAPY (H): Primary | ICD-10-CM

## 2023-10-19 PROCEDURE — G0463 HOSPITAL OUTPT CLINIC VISIT: HCPCS | Performed by: STUDENT IN AN ORGANIZED HEALTH CARE EDUCATION/TRAINING PROGRAM

## 2023-10-19 PROCEDURE — 99215 OFFICE O/P EST HI 40 MIN: CPT | Performed by: STUDENT IN AN ORGANIZED HEALTH CARE EDUCATION/TRAINING PROGRAM

## 2023-10-19 ASSESSMENT — PAIN SCALES - GENERAL: PAINLEVEL: MODERATE PAIN (4)

## 2023-10-19 NOTE — LETTER
10/19/2023         RE: Nathalie Bashir  [address] [facility] [address]        Dear Colleague,    Thank you for referring your patient, Nathalie Bashir, to the [facility]. Please see a copy of my visit note below.    [facility]   PM&R clinic note        Interval history:     Nathalie Bashir presents to clinic today for follow up reg her rehab needs.   She has h/o gastric adenocarcinoma (diagnosed during whipple in 1/2020 for presumed IPMN, no pancreatic malignancy found).   Was last seen in clinic on 6/29/23.  Recommendations included:  Therapy/equipment/braces:  Continue home exercise regimen as tolerated.  Order for quad cane placed at today's visit.  Referral to orthotics to be fit for new compression stockings.  Medications:  Continue topicals for pain relief.  Referral / follow up with other providers:  Referral placed to Dr. Urbina in med spine clinic for low back pain.  Follow up: 3 to 4 months.      Symptoms,  Nathalie was seen for a return visit today.   Since her last visit, she saw Dr. Urbina and had a diagnostic lumbar medial branch block done on 9/27/23.  She states that she has been doing ok since our last visit. The last several months were tough as her father in law dies.   The low back pain has been significantly better after she had the lumbar medial branch block done by Dr. Urbina. She is planning to call his office to schedule another visit.  She is still using her cane and feels like it is still helping.  Her right shoulder pain is still difficult and is wondering about an injection with Dr. Urbina for her arthritis.  She tripped recently about 2.5 weeks ago and she was outside on the patio with her cats, and all of a sudden, she tripped and fell. She had a few bruises on her legs but otherwise did not sustain any injuries. She did not hit her head.      Therapies/HEP,  Not currently participating in outpatient  therapies.      Functionally,   Modified independent with mobility, stable no changes.      Social history is unchanged.        Medications:  Current Outpatient Medications   Medication Sig Dispense Refill    amitriptyline (ELAVIL) 75 MG tablet Take 1 tablet (75 mg) by mouth At Bedtime 30 tablet 3    citalopram (CELEXA) 20 MG tablet Take 1.5 tablets (30 mg) by mouth daily 135 tablet 0    COMPOUNDED NON-CONTROLLED SUBSTANCE (CMPD RX) - PHARMACY TO MIX COMPOUNDED MEDICATION Estriol 1 mg/gram,place 1 gram vaginally three times per week,42.5 grams 1 each 3    Cyanocobalamin 1000 MCG CAPS Take 1 tablet by mouth every morning      cyclobenzaprine (FLEXERIL) 10 MG tablet TAKE 1 TABLET BY MOUTH THREE TIMES A DAY AS NEEDED FOR MUSCLE SPASMS 90 tablet 3    famotidine (PEPCID) 20 MG tablet TAKE 1 TABLET (20 MG) BY MOUTH 2 TIMES DAILY AS NEEDED 180 tablet 1    fluticasone (FLONASE) 50 MCG/ACT nasal spray Spray 1 spray into both nostrils daily at 2 pm (Patient not taking: Reported on 10/17/2023)      hydrOXYzine (VISTARIL) 25 MG capsule Take 25-50 mg by mouth 4 times daily as needed for itching or anxiety      loperamide (IMODIUM) 2 MG capsule Take 4 mg by mouth 4 times daily as needed for diarrhea (Patient not taking: Reported on 10/17/2023)      loratadine-pseudoePHEDrine (CLARITIN-D 24-HOUR)  MG 24 hr tablet Take 1 tablet by mouth daily as needed for allergies Usually takes in Spring/Fall (Patient not taking: Reported on 10/17/2023)      ondansetron (ZOFRAN) 8 MG tablet Take 1 tablet (8 mg) by mouth every 8 hours as needed (Nausea/Vomiting) 90 tablet 2    oxyCODONE (ROXICODONE) 5 MG tablet Take 1 tablet (5 mg) by mouth 3 times daily as needed for breakthrough pain or moderate to severe pain Maximum 3 pills/day. 90 tablet 0    pantoprazole (PROTONIX) 40 MG EC tablet TAKE 1 TABLET BY MOUTH TWICE A  tablet 1    sennosides (SENOKOT) 8.6 MG tablet 1-2 tablets 1-2 times daily as needed. (Patient not taking: Reported on  10/17/2023) 120 tablet 1    simethicone 80 MG TABS Take 1 tablet by mouth every 6 hours as needed (bloating, gas, belching) 90 tablet 3    sulfamethoxazole-trimethoprim (BACTRIM DS) 800-160 MG tablet Take 1 tablet by mouth 2 times daily (Patient not taking: Reported on 9/5/2023) 10 tablet 0    SUMAtriptan (IMITREX) 100 MG tablet Take 100 mg by mouth at onset of headache for migraine (Patient not taking: Reported on 10/17/2023)                Physical Exam:   LMP 09/23/2014   Gen: NAD, pleasant and cooperative  HEENT: Normocephalic, atraumatic, extra-ocular movements appear intact  Pulm: non-labored breathing in room air  Ext: No edema in bilateral lower extremities, no calf tenderness bilaterally.  MMT: Bilateral hand deformities, L>R, especially at DIP joints, swelling, TTP  Neuro/MSK:   Orientation: Oriented to person, place, time, situation. Exhibits good insight into her condition and ongoing management/symptoms.  Motor: Moving bilateral upper and lower extremities actively against gravity with no difficulties.  5/5 in all muscle groups in bilateral lower extremities.  Sensory: intact to light touch throughout all dermatomes in bilateral lower extremities.    Labs/Imaging:  Lab Results   Component Value Date    WBC 6.1 08/30/2023    HGB 11.8 08/30/2023    HCT 36.9 08/30/2023    MCV 95 08/30/2023     08/30/2023     Lab Results   Component Value Date     09/05/2023    POTASSIUM 3.9 09/05/2023    CHLORIDE 106 09/05/2023    CO2 27 09/05/2023    GLC 96 09/05/2023     Lab Results   Component Value Date    GFRESTIMATED 72 09/05/2023    GFRESTBLACK 81 05/21/2021     Lab Results   Component Value Date    AST 28 09/05/2023    ALT 19 09/05/2023    ALKPHOS 100 09/05/2023    BILITOTAL 0.2 09/05/2023     Lab Results   Component Value Date    INR 1.21 (H) 12/07/2020     Lab Results   Component Value Date    BUN 8.1 09/05/2023    CR 0.93 09/05/2023              Assessment/Plan   Nathalie Bashir presents to clinic  today for follow up reg her rehab needs.   She has h/o gastric adenocarcinoma (diagnosed during whipple in 1/2020 for presumed IPMN, no pancreatic malignancy found). Was last seen in clinic on 6/29/23.  Multiple rehabilitation considerations were discussed with Dena at today's visit.  Overall, she is doing very well from a rehabilitation perspective, her strength is good and her back pain has reduced significantly by 50% per history.  She should follow-up with Dr. Urbina as planned.  In addition, she requested a referral to a multidisciplinary pain clinic today, stated that she has discussed this with Dr. Óscar Cook and it was recommended.  We placed the referral for her.  She should continue her regular home exercise regimen as tolerated.  We will plan a return visit in 6 to 8 months.  She is in agreement with this plan.      Therapy/equipment/braces:  Continue regular home exercise regimen as tolerated.  Continue using cane as assistive device for safety and fall prevention.  Medications:  Continue with current pain medication regimen including oxycodone as prescribed by palliative care.  Referral / follow up with other providers:  Comprehensive pain clinic referral placed.  Follow up: 6 to 8 months.      Apurva Dobbs MD  Physical Medicine & Rehabilitation      50 minutes spent on the date of the encounter doing chart review, history and exam, documentation and further activities as noted above.

## 2023-10-19 NOTE — PROGRESS NOTES
[facility]   PM&R clinic note        Interval history:     Nathalie Bashir presents to clinic today for follow up reg her rehab needs.   She has h/o gastric adenocarcinoma (diagnosed during whipple in 1/2020 for presumed IPMN, no pancreatic malignancy found).   Was last seen in clinic on 6/29/23.  Recommendations included:  Therapy/equipment/braces:  Continue home exercise regimen as tolerated.  Order for quad cane placed at today's visit.  Referral to orthotics to be fit for new compression stockings.  Medications:  Continue topicals for pain relief.  Referral / follow up with other providers:  Referral placed to Dr. Urbina in med spine clinic for low back pain.  Follow up: 3 to 4 months.      Symptoms,  Nathalie was seen for a return visit today.   Since her last visit, she saw Dr. Urbina and had a diagnostic lumbar medial branch block done on 9/27/23.  She states that she has been doing ok since our last visit. The last several months were tough as her father in law dies.   The low back pain has been significantly better after she had the lumbar medial branch block done by Dr. Urbina. She is planning to call his office to schedule another visit.  She is still using her cane and feels like it is still helping.  Her right shoulder pain is still difficult and is wondering about an injection with Dr. Urbina for her arthritis.  She tripped recently about 2.5 weeks ago and she was outside on the patio with her cats, and all of a sudden, she tripped and fell. She had a few bruises on her legs but otherwise did not sustain any injuries. She did not hit her head.      Therapies/HEP,  Not currently participating in outpatient therapies.      Functionally,   Modified independent with mobility, stable no changes.      Social history is unchanged.        Medications:  Current Outpatient Medications   Medication Sig Dispense Refill    amitriptyline (ELAVIL) 75 MG tablet Take 1 tablet (75 mg)  by mouth At Bedtime 30 tablet 3    citalopram (CELEXA) 20 MG tablet Take 1.5 tablets (30 mg) by mouth daily 135 tablet 0    COMPOUNDED NON-CONTROLLED SUBSTANCE (CMPD RX) - PHARMACY TO MIX COMPOUNDED MEDICATION Estriol 1 mg/gram,place 1 gram vaginally three times per week,42.5 grams 1 each 3    Cyanocobalamin 1000 MCG CAPS Take 1 tablet by mouth every morning      cyclobenzaprine (FLEXERIL) 10 MG tablet TAKE 1 TABLET BY MOUTH THREE TIMES A DAY AS NEEDED FOR MUSCLE SPASMS 90 tablet 3    famotidine (PEPCID) 20 MG tablet TAKE 1 TABLET (20 MG) BY MOUTH 2 TIMES DAILY AS NEEDED 180 tablet 1    fluticasone (FLONASE) 50 MCG/ACT nasal spray Spray 1 spray into both nostrils daily at 2 pm (Patient not taking: Reported on 10/17/2023)      hydrOXYzine (VISTARIL) 25 MG capsule Take 25-50 mg by mouth 4 times daily as needed for itching or anxiety      loperamide (IMODIUM) 2 MG capsule Take 4 mg by mouth 4 times daily as needed for diarrhea (Patient not taking: Reported on 10/17/2023)      loratadine-pseudoePHEDrine (CLARITIN-D 24-HOUR)  MG 24 hr tablet Take 1 tablet by mouth daily as needed for allergies Usually takes in Spring/Fall (Patient not taking: Reported on 10/17/2023)      ondansetron (ZOFRAN) 8 MG tablet Take 1 tablet (8 mg) by mouth every 8 hours as needed (Nausea/Vomiting) 90 tablet 2    oxyCODONE (ROXICODONE) 5 MG tablet Take 1 tablet (5 mg) by mouth 3 times daily as needed for breakthrough pain or moderate to severe pain Maximum 3 pills/day. 90 tablet 0    pantoprazole (PROTONIX) 40 MG EC tablet TAKE 1 TABLET BY MOUTH TWICE A  tablet 1    sennosides (SENOKOT) 8.6 MG tablet 1-2 tablets 1-2 times daily as needed. (Patient not taking: Reported on 10/17/2023) 120 tablet 1    simethicone 80 MG TABS Take 1 tablet by mouth every 6 hours as needed (bloating, gas, belching) 90 tablet 3    sulfamethoxazole-trimethoprim (BACTRIM DS) 800-160 MG tablet Take 1 tablet by mouth 2 times daily (Patient not taking: Reported  on 9/5/2023) 10 tablet 0    SUMAtriptan (IMITREX) 100 MG tablet Take 100 mg by mouth at onset of headache for migraine (Patient not taking: Reported on 10/17/2023)                Physical Exam:   LMP 09/23/2014   Gen: NAD, pleasant and cooperative  HEENT: Normocephalic, atraumatic, extra-ocular movements appear intact  Pulm: non-labored breathing in room air  Ext: No edema in bilateral lower extremities, no calf tenderness bilaterally.  MMT: Bilateral hand deformities, L>R, especially at DIP joints, swelling, TTP  Neuro/MSK:   Orientation: Oriented to person, place, time, situation. Exhibits good insight into her condition and ongoing management/symptoms.  Motor: Moving bilateral upper and lower extremities actively against gravity with no difficulties.  5/5 in all muscle groups in bilateral lower extremities.  Sensory: intact to light touch throughout all dermatomes in bilateral lower extremities.    Labs/Imaging:  Lab Results   Component Value Date    WBC 6.1 08/30/2023    HGB 11.8 08/30/2023    HCT 36.9 08/30/2023    MCV 95 08/30/2023     08/30/2023     Lab Results   Component Value Date     09/05/2023    POTASSIUM 3.9 09/05/2023    CHLORIDE 106 09/05/2023    CO2 27 09/05/2023    GLC 96 09/05/2023     Lab Results   Component Value Date    GFRESTIMATED 72 09/05/2023    GFRESTBLACK 81 05/21/2021     Lab Results   Component Value Date    AST 28 09/05/2023    ALT 19 09/05/2023    ALKPHOS 100 09/05/2023    BILITOTAL 0.2 09/05/2023     Lab Results   Component Value Date    INR 1.21 (H) 12/07/2020     Lab Results   Component Value Date    BUN 8.1 09/05/2023    CR 0.93 09/05/2023              Assessment/Plan   Nathalie ALANIZ Maria Ebao presents to clinic today for follow up reg her rehab needs.   She has h/o gastric adenocarcinoma (diagnosed during whipple in 1/2020 for presumed IPMN, no pancreatic malignancy found). Was last seen in clinic on 6/29/23.  Multiple rehabilitation considerations were discussed with Dena  at today's visit.  Overall, she is doing very well from a rehabilitation perspective, her strength is good and her back pain has reduced significantly by 50% per history.  She should follow-up with Dr. Urbina as planned.  In addition, she requested a referral to a multidisciplinary pain clinic today, stated that she has discussed this with Dr. Óscar Cook and it was recommended.  We placed the referral for her.  She should continue her regular home exercise regimen as tolerated.  We will plan a return visit in 6 to 8 months.  She is in agreement with this plan.      Therapy/equipment/braces:  Continue regular home exercise regimen as tolerated.  Continue using cane as assistive device for safety and fall prevention.  Medications:  Continue with current pain medication regimen including oxycodone as prescribed by palliative care.  Referral / follow up with other providers:  Comprehensive pain clinic referral placed.  Follow up: 6 to 8 months.      Apurva Dobbs MD  Physical Medicine & Rehabilitation      50 minutes spent on the date of the encounter doing chart review, history and exam, documentation and further activities as noted above.

## 2023-10-19 NOTE — NURSING NOTE
"Oncology Rooming Note    October 19, 2023 3:08 PM   Nathalie Bashir is a 58 year old female who presents for:    Chief Complaint   Patient presents with    Oncology Clinic Visit     Peripheral neuropathy due to chemotherapy     Initial Vitals: /78   Pulse 104   Temp 98.4  F (36.9  C) (Oral)   Resp 16   Wt 80.3 kg (177 lb)   LMP 09/23/2014   SpO2 97%   BMI 26.14 kg/m   Estimated body mass index is 26.14 kg/m  as calculated from the following:    Height as of 9/27/23: 1.753 m (5' 9\").    Weight as of this encounter: 80.3 kg (177 lb). Body surface area is 1.98 meters squared.  Moderate Pain (4) Comment: Data Unavailable   Patient's last menstrual period was 09/23/2014.  Allergies reviewed: Yes  Medications reviewed: Yes    Medications: Medication refills not needed today.  Pharmacy name entered into EPIC:    [facility] [place], [place] - [phone]  [facility] [address] [place] [facility] [address] [facility] [address] [place] - [address] [place] [address] [facility] [place], [place] - [address] [place] [address] PHARMACY [place], [place] - [address] [place] [address]    Clinical concerns:        Ania Willams CMA            "

## 2023-10-30 ENCOUNTER — TELEPHONE (OUTPATIENT)
Dept: UROLOGY | Facility: CLINIC | Age: 58
End: 2023-10-30

## 2023-10-30 ENCOUNTER — LAB (OUTPATIENT)
Dept: LAB | Facility: HOSPITAL | Age: 58
End: 2023-10-30
Payer: MEDICARE

## 2023-10-30 DIAGNOSIS — N39.0 UTI (URINARY TRACT INFECTION): ICD-10-CM

## 2023-10-30 DIAGNOSIS — N39.0 UTI (URINARY TRACT INFECTION): Primary | ICD-10-CM

## 2023-10-30 LAB
ALBUMIN UR-MCNC: 30 MG/DL
APPEARANCE UR: ABNORMAL
BILIRUB UR QL STRIP: NEGATIVE
COLOR UR AUTO: YELLOW
GLUCOSE UR STRIP-MCNC: NEGATIVE MG/DL
HGB UR QL STRIP: ABNORMAL
KETONES UR STRIP-MCNC: ABNORMAL MG/DL
LEUKOCYTE ESTERASE UR QL STRIP: ABNORMAL
NITRATE UR QL: NEGATIVE
PH UR STRIP: 6 [PH] (ref 5–7)
SP GR UR STRIP: 1.02 (ref 1–1.03)
UROBILINOGEN UR STRIP-MCNC: 3 MG/DL

## 2023-10-30 PROCEDURE — 81003 URINALYSIS AUTO W/O SCOPE: CPT | Mod: QW

## 2023-10-30 PROCEDURE — 87186 SC STD MICRODIL/AGAR DIL: CPT

## 2023-10-30 ASSESSMENT — PAIN SCALES - PAIN ENJOYMENT GENERAL ACTIVITY SCALE (PEG)
INTERFERED_ENJOYMENT_LIFE: 5
AVG_PAIN_PASTWEEK: 7
PEG_TOTALSCORE: 6.67
INTERFERED_GENERAL_ACTIVITY: 8

## 2023-10-30 ASSESSMENT — ANXIETY QUESTIONNAIRES
1. FEELING NERVOUS, ANXIOUS, OR ON EDGE: NOT AT ALL
6. BECOMING EASILY ANNOYED OR IRRITABLE: NOT AT ALL
GAD7 TOTAL SCORE: 0
4. TROUBLE RELAXING: NOT AT ALL
7. FEELING AFRAID AS IF SOMETHING AWFUL MIGHT HAPPEN: NOT AT ALL
5. BEING SO RESTLESS THAT IT IS HARD TO SIT STILL: NOT AT ALL
2. NOT BEING ABLE TO STOP OR CONTROL WORRYING: NOT AT ALL
3. WORRYING TOO MUCH ABOUT DIFFERENT THINGS: NOT AT ALL
GAD7 TOTAL SCORE: 0
IF YOU CHECKED OFF ANY PROBLEMS ON THIS QUESTIONNAIRE, HOW DIFFICULT HAVE THESE PROBLEMS MADE IT FOR YOU TO DO YOUR WORK, TAKE CARE OF THINGS AT HOME, OR GET ALONG WITH OTHER PEOPLE: SOMEWHAT DIFFICULT

## 2023-10-30 NOTE — TELEPHONE ENCOUNTER
[facility] Call Center    Phone Message    May a detailed message be left on voicemail: yes     Reason for Call: Symptoms or Concerns     If patient has red-flag symptoms, warm transfer to triage line    Current symptom or concern: Bladder infection    Symptoms have been present for:  1 week(s)    Has patient previously been seen for this? Yes        Are there any new or worsening symptoms? Yes: Over the weekend bloating/ pain gotten worse    Action Taken: Message routed to:  [facility] uro    Travel Screening: Not Applicable

## 2023-10-30 NOTE — TELEPHONE ENCOUNTER
Patient has urinary urgency/frequency, right-sided flank tenderness, says she has flank pain last week which she rated 5.5/10, urine cloudy and it smells. Patient denies fever/chills/nausea. Patient has history of recurrent UTI's   Patient to have urine culture done today at [facility].  Orders placed    Samreen Rizo RN, BSN  Care Coordinator Urology  [facility] [place]  [facility]  [phone]

## 2023-11-01 DIAGNOSIS — N39.0 UTI (URINARY TRACT INFECTION): Primary | ICD-10-CM

## 2023-11-01 LAB — BACTERIA UR CULT: ABNORMAL

## 2023-11-01 RX ORDER — SULFAMETHOXAZOLE/TRIMETHOPRIM 800-160 MG
1 TABLET ORAL 2 TIMES DAILY
Qty: 10 TABLET | Refills: 0 | Status: SHIPPED | OUTPATIENT
Start: 2023-11-01 | End: 2023-11-06

## 2023-11-02 NOTE — PROGRESS NOTES
Date:11/03/2023      COMPREHENSIVE PAIN CLINIC INITIAL EVALUATION    I had the pleasure of meeting Ms. Nathalie Bashir on 11/3/2023 in the [facility] per request of Dr. Dobbs with regards to her pain.  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  Stomach cancer was treated with gastrectomy and chemotherapy.  Doctors found stomach cancer when they did Whipple Surgery 01/2020 at [facility].  Patient has numbness and tingling in legs in the stocking foot distribution and b/l hands.  Patient has lumbar pain and has had lumbar MBB x 1 with Dr. Urbina.  Patient has not had any spine surgery in the past.  The patient describes the pain as constant, stabbing, burning and numbness.  She reports that the pain is made worse by prolong standing, ADL's.  Her pain is improved with laying down, rest.  She rates her currenty pain score at 7/10, but it can be as low as 3/10 or as severe as 9/10. Patient follows with Dr. Urbina for lumbar intervention options.  Rheumatology could not find any cause for her joint pain.      Progress Notes Reviewed:  10/19/2023 Dr. Dobbs, PM&R - order quad cane and compression stockings, follow-up with Dr. Urbina for low back pain  10/17/2023 Dr. Mckoy, Rheumatology  10/01/2023 UC - UTI  09/27/2023 Dr. Urbina - lumbar MBB L4-5 and L5-S1  09/12/2023 Dr. Urbina    Patient reports depression is under control on citalopram 30mg.  Patient does not follow with a mental health care provider.  Last PT was 2018.  Patient exercises with stretching at home.    She denies any new problems with falls or balance, any new numbness or weakness of the arms or legs, any  new bowel or bladder incontinence, any night sweats or unexplained fevers, or any sudden or unexpected weight loss.      Nathalie Bashir has been seen at a pain clinic in the past in [place] 10 years ago, [facility] in 1990s and  [facility].      Current Treatments:  Amitriptyline 75mg q hs - she ran out of this medication for sleep.  Not helpful for neuropathic pain.  Celexa 20mg 1.5 tabs daily 30mg daily  Compounding medication  Cyclobenzaprine 10 mg TID PRN  Hydroxyzine 25mg 1-2 tabs QID anxiety  Zofran 8mg prn nausea with migraines  Oxycodone 5mg TID - since 2020 with palliative care  Stool softeners PRN  Imitrex 100mg PRN HA    NOTE:  allergies to naprosyn, methadone, latex, ketorlac, gabapentin, acetaminophen, topiramate, pregabalin and oxycontin    Previous Medication Treatments Included:  Anti-convulsants: Gabapentin, pregabalin had negative SE  Muscle relaxors: Tizanidine not helpful  Anti-depressants: amitriptyline was not helpful for pain, duloxetine had negative SE with mood  Benzodiazapine's: none  Acetaminophen/NSAIDs: negative SE  Topicals: Voltaren gel 1%  Headache abortives: imitrex  Headache prophylactics:   Opioids: MS ER 30mg BID, Oxycodone 10mg BID at previous pain - [facility] in 1990, buprenorphine caused negative SE      Other Treatments Have Included:  Physical therapy: yes over 2 years ago  Pain Psychology: no  Chiropractic: yes - not helpful  Acupuncture: yes - not helpful  TENs Unit: yes she has one  Injections: 09/27/2023 lumbar MBB #1  Surgeries: L) hip replacement 2015, L) SI fusion 2008  Dry Needling: no  Massage:no    Past Medical History:  Medical history reviewed.  Past Medical History:   Diagnosis Date    Allergic rhinitis     Cancer (H)     stomach cancer    Chronic pain     Closed fracture of distal end of left radius     Depression     Gastric adenocarcinoma (H)     Lumbago     Migraine     Opioid dependence (H)     Other chronic pain     low back    Sacroiliitis (H24)     low  back pain    Trochanteric bursitis     leg length discrrepancy, hip pain      Patient Active Problem List   Diagnosis    Lower limb length difference    IPMN (intraductal papillary mucinous neoplasm)    Allergic rhinitis    Arthralgia of hip    Bursitis    Chronic neck pain    Chronic pain disorder    Continuous opioid dependence (H)    Controlled substance agreement signed    Debility    Degeneration of intervertebral disc of lumbosacral region    Depression    Disorder of sacrum    Greater trochanteric bursitis of left hip    Infected prosthesis of left hip (H24)    Chronic bilateral low back pain without sciatica    MDD (major depressive disorder), recurrent, in partial remission (H24)    Migraine with aura    Sacroiliitis (H24)    Screening for malignant neoplasm of cervix    Spondylosis of lumbosacral spine without myelopathy    Status post left hip replacement    Cervical radiculopathy, chronic    Unilateral inguinal hernia without obstruction or gangrene    Long term (current) use of opiate analgesic    Low urine output    Hypophosphatemia    Leukocytosis    Intra-abdominal fluid collection    Bacteremia due to Gram-negative bacteria    Malnutrition (H24)    Gastric adenocarcinoma (H)    Pneumonia    Mucositis    Chemotherapy-induced neutropenia     Gastric cancer (H)    Cholangitis (H28)    Thrombocytopenia (H24)    Urinary tract infection    Crohn's disease of small intestine (H)    Jejunitis    Colitis    Abdominal pain    Malignant neoplasm of overlapping sites of stomach (H)    Iron deficiency anemia    Anemia    Paralytic ileus (H)    Hypokalemia    Nausea & vomiting    Dysphagia    History of gastric surgery    Chemotherapy-induced neuropathy     Bilateral hand pain    Pyelonephritis    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)    Other closed intra-articular fracture of distal end of left radius, initial encounter    Lumbar facet arthropathy    Lumbar spondylosis    H/O Whipple  procedure       Past Surgical History:  Pertinent surgical history reviewed.  Past Surgical History:   Procedure Laterality Date    ARTHROPLASTY HIP Left 2016    BACK SURGERY      L4-5 decompression    CATARACT IOL, RT/LT      CHOLECYSTECTOMY N/A 1/28/2020    Procedure: Cholecystectomy;  Surgeon: Yandel Mallory MD;  [facility]: [facility]    ENDOSCOPIC RETROGRADE CHOLANGIOPANCREATOGRAM N/A 7/27/2020    Procedure: ENDOSCOPIC RETROGRADE CHOLANGIOPANCREATOGRAPHY  **Latex Allergy** with jejunal biopsies;  Surgeon: Jeet Aviles MD;  [facility]    ESOPHAGOSCOPY, GASTROSCOPY, DUODENOSCOPY (EGD), COMBINED N/A 3/3/2020    Procedure: ESOPHAGOGASTRODUODENOSCOPY, WITH ENDOSCOPIC US (enoxaparin);  Surgeon: Bakari Plata MD;  [facility]: [facility] GI    ESOPHAGOSCOPY, GASTROSCOPY, DUODENOSCOPY (EGD), COMBINED N/A 12/7/2020    Procedure: Upper Endoscopy with stent placement;  Surgeon: Jeet Aviles MD;  Location:  OR    ESOPHAGOSCOPY, GASTROSCOPY, DUODENOSCOPY (EGD), COMBINED N/A 2/8/2021    Procedure: ESOPHAGOGASTRODUODENOSCOPY (EGD)AND STENT REMOVAL;  Surgeon: Jeet Aviles MD;  Location:  OR    EYE SURGERY      GASTRECTOMY N/A 10/21/2020    Procedure: Open subtotal gastectomy with David en Y Reconstruction; D1 Lymph Node Disection  **Latex Allergy**;  Surgeon: Yandel Mallory MD;  [facility]    INJECT BLOCK MEDIAL BRANCH CERVICAL/THORACIC/LUMBAR Bilateral 9/27/2023    Procedure: BILATERAL L4-5, L5-S1 medial branch block #1;  Surgeon: Maria Victoria Urbina MD;  Location: [facility] OR    IR CHEST PORT PLACEMENT > 5 YRS OF AGE  3/26/2020    IR LUMBAR EPIDURAL STEROID INJECTION  8/25/2009    IR LUMBAR EPIDURAL STEROID INJECTION  9/2/2009    IR LUMBAR EPIDURAL STEROID INJECTION  9/25/2009    IR LUMBAR EPIDURAL STEROID INJECTION  11/23/2009    IR LUMBAR EPIDURAL STEROID INJECTION  4/1/2010    IR LUMBAR EPIDURAL STEROID INJECTION  9/1/2010    IR LUMBAR EPIDURAL STEROID INJECTION  3/10/2011    IR LUMBAR EPIDURAL  STEROID INJECTION  8/30/2011    LAPAROSCOPY DIAGNOSTIC (GENERAL) N/A 10/13/2020    Procedure: Diagnostic laparoscopy with peritoneal washings  **Latex Allergy**;  Surgeon: Yandel Mallory MD;  [facility]    OPEN REDUCTION INTERNAL FIXATION RODDING INTRAMEDULLARY FEMUR Left 12/23/2014    Procedure: OPEN REDUCTION INTERNAL FIXATION RODDING INTRAMEDULLARY FEMUR;  Surgeon: Arnol Santiago MD;  [facility]    OPEN REDUCTION INTERNAL FIXATION WRIST Left 9/22/2022    Procedure: LEFT WRIST OPEN REDUCTION INTERNAL FIXATION, FRACTURE;  Surgeon: Mark Villalobos MD;  [place] [facility] [place]    ORTHOPEDIC SURGERY      PICC DOUBLE LUMEN PLACEMENT Right 02/07/2020    5 fr double lumen 41 cm    MT LAMINEC/FACETECT/FORAMIN,LUMBAR 1 SEG      Description: Laminectomy Decompress, Facetectomy, Foraminotomy Lumbar Seg;  Recorded: 04/12/2012;    REMOVE HARDWARE LOWER EXTREMITY Left 4/7/2015    Procedure: REMOVE HARDWARE LOWER EXTREMITY;  Surgeon: Arnol Santiago MD;  [facility]    REMOVE HARDWARE RODDING INTRAMEDULLARY FEMUR Left 12/17/2015    Procedure: REMOVE HARDWARE RODDING INTRAMEDULLARY FEMUR;  Surgeon: Arnol Santiago MD;  [facility]    RESECT BONE LOWER EXTREMITY Left 12/23/2014    Procedure: RESECT BONE LOWER EXTREMITY;  Surgeon: Arnol Santiago MD;  [facility]    SHORTENING OSTEOPLASTY FEMUR W/ IM NAILING      WHIPPLE PROCEDURE N/A 1/28/2020    Procedure: Open Whipple procedure and Cholecystectomy;  Surgeon: Yandel Mallory MD;  Location: [facility] OR    WHIPPLE PROCEDURE      ZZC FUSION OF SACROILIAC JOINT      Description: Arthrodesis Sacroiliac Joint Left;  Recorded: 10/07/2011;    ZZC REPAIR DETACH RETINA,SCLERAL BUCKLE          Medications: Pertinent medications reviewed.  Current Outpatient Medications   Medication Sig Dispense Refill    amitriptyline (ELAVIL) 75 MG tablet Take 1 tablet (75 mg) by mouth At Bedtime 30 tablet 3    citalopram (CELEXA) 20 MG tablet Take 1.5 tablets (30 mg) by mouth daily 135  tablet 0    COMPOUNDED NON-CONTROLLED SUBSTANCE (CMPD RX) - PHARMACY TO MIX COMPOUNDED MEDICATION Estriol 1 mg/gram,place 1 gram vaginally three times per week,42.5 grams 1 each 3    Cyanocobalamin 1000 MCG CAPS Take 1 tablet by mouth every morning      cyclobenzaprine (FLEXERIL) 10 MG tablet TAKE 1 TABLET BY MOUTH THREE TIMES A DAY AS NEEDED FOR MUSCLE SPASMS 90 tablet 3    famotidine (PEPCID) 20 MG tablet TAKE 1 TABLET (20 MG) BY MOUTH 2 TIMES DAILY AS NEEDED 180 tablet 1    fluticasone (FLONASE) 50 MCG/ACT nasal spray Spray 1 spray into both nostrils daily at 2 pm (Patient not taking: Reported on 10/17/2023)      hydrOXYzine (VISTARIL) 25 MG capsule Take 25-50 mg by mouth 4 times daily as needed for itching or anxiety      loperamide (IMODIUM) 2 MG capsule Take 4 mg by mouth 4 times daily as needed for diarrhea (Patient not taking: Reported on 10/17/2023)      loratadine-pseudoePHEDrine (CLARITIN-D 24-HOUR)  MG 24 hr tablet Take 1 tablet by mouth daily as needed for allergies Usually takes in Spring/Fall (Patient not taking: Reported on 10/17/2023)      ondansetron (ZOFRAN) 8 MG tablet Take 1 tablet (8 mg) by mouth every 8 hours as needed (Nausea/Vomiting) 90 tablet 2    oxyCODONE (ROXICODONE) 5 MG tablet Take 1 tablet (5 mg) by mouth 3 times daily as needed for breakthrough pain or moderate to severe pain Maximum 3 pills/day. 90 tablet 0    pantoprazole (PROTONIX) 40 MG EC tablet TAKE 1 TABLET BY MOUTH TWICE A  tablet 1    sennosides (SENOKOT) 8.6 MG tablet 1-2 tablets 1-2 times daily as needed. (Patient not taking: Reported on 10/17/2023) 120 tablet 1    simethicone 80 MG TABS Take 1 tablet by mouth every 6 hours as needed (bloating, gas, belching) 90 tablet 3    sulfamethoxazole-trimethoprim (BACTRIM DS) 800-160 MG tablet Take 1 tablet by mouth 2 times daily for 5 days 10 tablet 0    sulfamethoxazole-trimethoprim (BACTRIM DS) 800-160 MG tablet Take 1 tablet by mouth 2 times daily (Patient not  taking: Reported on 9/5/2023) 10 tablet 0    SUMAtriptan (IMITREX) 100 MG tablet Take 100 mg by mouth at onset of headache for migraine         [place] Prescription Monitoring Program reviewed 11/2/2023.  No concern for abuse or misuse of controlled medications based on this report.  10/18/2023 Oxycodone 5mg 90 tabs for 30 days  09/20/2023 Oxycodone 5mg 90 tabs for 30 days  Monthly scripts for oxycodone in 2023 no other medications listed.    Allergies: Pertinent allergies reviewed.     Allergies   Allergen Reactions    Gluten Meal Palpitations    Amoxicillin-Pot Clavulanate Rash     Patient tolerated Zosyn in 7/2020    Oxycontin [Oxycodone] Other (See Comments)     Confusion, loopy, oxycodone is tolerated.    Pt states she tolerates regular oxycodone, cannot take 12 hour oxycontin.      Pregabalin      interfered with Cymbalta    Topiramate      Tongue numbness, taste alteration, irritable     Acetaminophen Nausea     Urine retention      Dust Mite Extract     Gabapentin Dizziness and GI Disturbance    Ketorolac Headache    Latex Rash    Methadone Fatigue    Mold     Naprosyn [Naproxen] Dizziness and GI Disturbance    Seasonal Allergies        Family History:   family history includes Dementia in her father; Heart Failure (age of onset: 77) in her mother; No Known Problems in her brother and sister.    Social History:   She is  and  lives in a house.  She has 1 grown child.  She is on SSD.  She is independent in ADL's.  She has 4 grand children who live in [place].  She  reports that she quit smoking about 3 years ago. Her smoking use included cigarettes. She has a 12.50 pack-year smoking history. She has been exposed to tobacco smoke. She has never used smokeless tobacco. She reports that she does not drink alcohol and does not use drugs.  Social History     Social History Narrative    Not on file         Review of Systems:      (Positive responses bolded)  GENERAL: fever/chills, fatigue, general unwell  feeling, weight gain/loss  HEAD/EYES:  headache, dizziness, or vision changes  EARS/NOSE/THROAT: nosebleeds, hearing loss, sinus infection, earache, tinnitus  IMMUNE:  allergies, cancer, immune deficiency, or infections  SKIN:  itching, rash, hives  HEME/Lymphatic: anemia, easy bruising, easy bleeding  RESPIRATORY: cough, wheezing, or shortness of breath  CARDIOVASCULAR/Circulation: extremity edema, syncope, hypertension, tachycardia, or angina  GASTROINTESTINAL: abdominal pain, nausea/emesis, diarrhea, constipation, hematochezia, or melena  ENDOCRINE:  diabetes, steroid use, thyroid disease or osteoporosis  MUSCULOSKELETAL: myalgias, joint pain, stiffness, neck pain, back pain, arthritis, or gout  GENITOURINARY: frequency, urgency, dysuria, difficulty voiding, hematuria or incontinence  NEUROLOGIC: weakness, numbness, paresthesias, seizure, tremor, stroke or memory loss  PSYCHIATRIC: depression, anxiety, stress, suicidal thoughts/attempts or mood swings    Physical Exam:  [facility] 09/23/2014     Constitutional: She is oriented to person, place, and time.  She appears well-developed and well-nourished. She is not in acute distress.   HENT:     Head: Normocephalic and atraumatic.     Eyes: Pupils are equal, round, and reactive to light. EOM are normal. No scleral icterus.   Pulmonary/Chest:  NWOB. No respiratory distress.   Neurological: She is alert and oriented to person, place, and time. Coordination grossly normal.  Romberg test negative. Powell's test is negative  Skin: Skin is warm and dry. She is not diaphoretic.   Psychiatric: She has a normal mood and affect. Her behavior is normal. Judgment and thought content normal.  Patient answers questions appropriately.  MSK: Gait is antalgic.  Patient can walk on toes, heals, heal toe walk and perform heal to shin testing without difficulty.  She had the most difficulty with heal to shin testing.    Lumbar/Thoracic spine:   ROM: flexion 60 degrees, extension 10 degrees,  left lateral 20 degrees, and right lateral 20 degrees  Rotation/ext to right: pain free  Rotation/ext to left: pain free  Myofascial tenderness:left para lumbar muscles, right para lumbar muscles  Focal tenderness: No SI joint, gluteal, piriformis, or GT tenderness  Normal 5/5 LE strength bilaterally   Normal sensation to light touch in the lower extremities bilaterally   Straight leg raise: Negative on the left  Negative on the right      Imaging:  Narrative & Impression   EXAM: MR LUMBAR SPINE W/O CONTRAST  LOCATION: [facility]  DATE: 9/3/2023     INDICATION: History of lumbar lami (+10yrs), primarily axial LBP>BLE radicular pain.  COMPARISON: Plain film imaging 08/29/2023 of the lumbar spine, prior body CT 08/30/2023 also reviewed.  TECHNIQUE: Routine Lumbar Spine MRI without IV contrast.     FINDINGS:   Nomenclature is based on 5 lumbar type vertebral bodies. Rudimentary 12th ribs are identified. Partial sacralization left L5. Correlate with plain film imaging if there is future plan for intervention. Satisfactory lumbar vertebral body height. 2 mm   degenerative anterior subluxation L3-L4 as on prior body CT. No high-grade subluxations. No marrow or ligamentous edema. Nothing for sacral insufficiency or stress fracture. Nothing for a marrow-infiltrative process. Benign vertebral body hemangioma at   L1. Interspace narrowing lower thoracic level and at L3-L4. Congenital narrowing of the L5-S1 interspace. Left SI joint space fusion hardware with degenerative changes right SI joint. Sacral neural foramina are intact. Normal distal spinal cord and cauda   equina with conus medullaris at L1. Rightward lumbar curve. Nerve roots of the cauda equina are satisfactory without nodularity thickening. No retroperitoneal solid mass or adenopathy. Symmetric psoas appearance. Normal caliber abdominal aorta.   Satisfactory renal contours.     T12-L1: Moderate loss of disc height with annular bulge. No  disc herniation, spinal stenosis or foraminal narrowing is present. Facet joints are satisfactory.      L1-L2: Normal disc height and signal. Mild annular bulge. No herniation. Normal facets. No spinal canal or neural foraminal stenosis.     L2-L3: Mild loss of disc height with annular bulge. No disc herniation. No spinal stenosis. Mild foraminal narrowing bilaterally. Mild facet joint hypertrophic changes are present.      L3-L4: Mild loss of disc height. Low-grade degenerative anterior subluxation. Generalized disc bulge. No disc herniation. Mild foraminal narrowing inferiorly. No spinal stenosis. Mild facet joint arthropathy bilaterally.     L4-L5: Normal disc height and signal. Annular bulge. No herniation. Normal facets. No spinal canal or right neural foraminal stenosis. Mild left foraminal narrowing.     L5-S1: Partial sacralization on the left. Interspace narrowing on a congenital/developmental basis with disc desiccation. No disc herniation. No spinal stenosis or foraminal narrowing.                                                                      IMPRESSION:  1.  See above for counting nomenclature, stable from prior body CT examination 08/30/2023 with partial sacralization left L5. Correlate with plain film imaging if there is future plan for intervention.     2.  Low-grade degenerative anterior subluxation L3 upon L4. No high-grade subluxations. No marrow or ligamentous edema.     3.  No evidence for severe spinal stenosis or severe foraminal compromise at any lumbar level.     4.  See above for additional details and full level by level description.         EXAM: XR SACRUM/COCCYX   LOCATION: [facility]   DATE/TIME: 6/14/2021 10:39 AM     INDICATION: Tailbone pain for a year, history of adenocarcinoma of the stomach   COMPARISON: None.     IMPRESSION: Postsurgical change with cage at the left SI joint. Left hip arthroplasty. No fracture or malalignment. No osseous lesion. L5-S1 degenerative  disc changes with facet arthrosis. Right hip is intact.     EXAM: XR SHOULDER RIGHT G/E 3 VIEWS  LOCATION: [facility]  DATE/TIME: 9/18/2022 12:10 PM     INDICATION: Pain following fall.  COMPARISON: None.                                                                      IMPRESSION: No fractures are identified. Normal glenohumeral alignment. Mild degenerative changes in the glenohumeral joint. The acromioclavicular joint is unremarkable. Right-sided port catheter.      EMG:  na      Diagnosis:  (G62.0,  T45.1X5A) Peripheral neuropathy due to chemotherapy  (primary encounter diagnosis)  Comment: She failed gabapentin, pregabalin and amitriptyline  Plan: Discussed Qutenza, Physical Therapy Referral      (M25.511,  G89.29) Chronic right shoulder pain  Comment: Will obtain MRI R) shoulder  Plan: Physical Therapy Referral, MR Shoulder Right         w/o Contrast            (M51.36) DDD (degenerative disc disease), lumbar  Comment: had lumbar MBB with Dr. Urbina and she needs to finish treatment  Plan: Physical Therapy Referral            (M67.444) Muscle spasm  Comment:   Plan: Continue cyclobenzaprine and stretching      (F41.9,  F32.A) Anxiety and depression  Comment:   Plan: continue celexa, consider pain psychology in the future.    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     (Z79.891) Use of opiates for therapeutic purposes  Comment: UDS and opioid agreement signed today  Plan: Drug Confirmation Panel Urine with Creatinine,         Ethanol urine              Plan:  A multimodal plan was developed today to treat your pain.  Multimodal analgesia is a strategy that reduces reliance on opioids through the use of non-opioid analgesics and therapies that have different mechanisms of action.    Will obtain UDS and opioid agreement today.      Diagnostics: lumbar MRI, XRAY R) shoulder, XRAY sacrum/hip  were reviewed today.        Medications:  No changes to medications today.    Discussed medical cannabis.    The following OTC pain medications may be helpful, use as directed: Voltaren Gel 1%, CBD products, Arnica products, Capsaicin products, Australian Dream Cream, Epson It, Arnica products, Lidocaine Patch, Solanpas, Biofreeze, Aspercream, Tiger Balm and Alejandro Emu cream.  Apply heat or cold PRN.      Therapies:  Discussed anti-inflammatory lifestyle.    Discussed Pain Psychology - consider in the future  Psychological treatments are also important part of pain management.  Understanding and managing the thoughts, emotions and behaviors that accompany the discomfort can help you cope more effectively with your pain and can actually reduce the intensity of your pain.      PHYSICAL THERAPY - referral place today for R) shoulder and lumbar spine  Discussed the importance of core strengthening, ROM, stretching exercises with the patient and how each of these entities is important in decreasing pain.  Explained to the patient that the purpose of physical therapy is to teach the patient a home exercise program.  These exercises need to be performed every day in order to decrease pain and prevent future occurrences of pain.        Discussed Grounding Mat - handout provided  https://[email or web address]/[email or web address]?v=[email or web address]    Discussed Frequency Specific Microcurrent - handout provided  Treatment for Neuropathic Pain.   [facility] [phone]  [facility] [phone]  May be able to be billed as a chiropractic service depending on your insurance coverage.     Discussed Acupuncture.    Discussed Zynex TENs unit.    Interventions:   She started treatment for lumbar spine with Dr. Urbina and needs to follow up at his clinic.        Follow up:     2 weeks after R) shoulder MRI to review and make further recommendations.    UDS and opioid agreement today and will review and if appropriate assume pain  management.    She started treatment for lumbar spine with Dr. Urbina and needs to follow up at his clinic.      NINA Santana, RN, CNP, FNP  [facility]        BILLING TIME DOCUMENTATION:   The total TIME spent on this patient on the date of the encounter/appointment was 85 minutes.          Answers submitted by the patient for this visit:  SARAH-7 (Submitted on 10/30/2023)  SARAH 7 TOTAL SCORE: 0

## 2023-11-03 ENCOUNTER — OFFICE VISIT (OUTPATIENT)
Dept: PALLIATIVE MEDICINE | Facility: OTHER | Age: 58
End: 2023-11-03
Attending: STUDENT IN AN ORGANIZED HEALTH CARE EDUCATION/TRAINING PROGRAM
Payer: MEDICARE

## 2023-11-03 VITALS
OXYGEN SATURATION: 98 % | SYSTOLIC BLOOD PRESSURE: 126 MMHG | DIASTOLIC BLOOD PRESSURE: 59 MMHG | WEIGHT: 177.7 LBS | HEART RATE: 114 BPM | BODY MASS INDEX: 26.24 KG/M2

## 2023-11-03 DIAGNOSIS — Z79.891 USE OF OPIATES FOR THERAPEUTIC PURPOSES: ICD-10-CM

## 2023-11-03 DIAGNOSIS — G62.0 PERIPHERAL NEUROPATHY DUE TO CHEMOTHERAPY (H): ICD-10-CM

## 2023-11-03 DIAGNOSIS — M79.2 NEUROPATHIC PAIN: Primary | ICD-10-CM

## 2023-11-03 DIAGNOSIS — F41.9 ANXIETY AND DEPRESSION: ICD-10-CM

## 2023-11-03 DIAGNOSIS — M51.369 DDD (DEGENERATIVE DISC DISEASE), LUMBAR: ICD-10-CM

## 2023-11-03 DIAGNOSIS — M25.511 CHRONIC RIGHT SHOULDER PAIN: ICD-10-CM

## 2023-11-03 DIAGNOSIS — C16.3 MALIGNANT NEOPLASM OF PYLORIC ANTRUM (H): ICD-10-CM

## 2023-11-03 DIAGNOSIS — F32.A ANXIETY AND DEPRESSION: ICD-10-CM

## 2023-11-03 DIAGNOSIS — G89.29 CHRONIC RIGHT SHOULDER PAIN: ICD-10-CM

## 2023-11-03 DIAGNOSIS — M62.838 MUSCLE SPASM: ICD-10-CM

## 2023-11-03 DIAGNOSIS — T45.1X5A PERIPHERAL NEUROPATHY DUE TO CHEMOTHERAPY (H): ICD-10-CM

## 2023-11-03 LAB
CANNABINOIDS UR QL SCN: NORMAL
CREAT UR-MCNC: 206 MG/DL
ETHANOL UR QL SCN: NORMAL

## 2023-11-03 PROCEDURE — 80360 METHYLPHENIDATE: CPT | Performed by: NURSE PRACTITIONER

## 2023-11-03 PROCEDURE — 80357 KETAMINE AND NORKETAMINE: CPT | Performed by: NURSE PRACTITIONER

## 2023-11-03 PROCEDURE — G0463 HOSPITAL OUTPT CLINIC VISIT: HCPCS | Performed by: NURSE PRACTITIONER

## 2023-11-03 PROCEDURE — 80307 DRUG TEST PRSMV CHEM ANLYZR: CPT | Performed by: NURSE PRACTITIONER

## 2023-11-03 PROCEDURE — 99205 OFFICE O/P NEW HI 60 MIN: CPT | Performed by: NURSE PRACTITIONER

## 2023-11-03 PROCEDURE — 80355 GABAPENTIN NON-BLOOD: CPT | Performed by: NURSE PRACTITIONER

## 2023-11-03 ASSESSMENT — PAIN SCALES - GENERAL: PAINLEVEL: EXTREME PAIN (8)

## 2023-11-03 NOTE — NURSING NOTE
Patient presents to the clinic today for a follow up with NINA Lott CNP  regarding Pain Management.           8/29/2023    10:38 AM 11/3/2023     7:55 AM   PEG Score   PEG Total Score 8 6          Dorene Benjamin MA  [facility] [place]

## 2023-11-03 NOTE — LETTER
Opioid / Opioid Plus Controlled Substance Agreement    This is an agreement between you and your provider about the safe and appropriate use of controlled substance/opioids prescribed by your care team. Controlled substances are medicines that can cause physical and mental dependence (abuse).    There are strict laws about having and using these medicines. We here at [facility] are committing to working with you in your efforts to get better. To support you in this work, we ll help you schedule regular office appointments for medicine refills. If we must cancel or change your appointment for any reason, we ll make sure you have enough medicine to last until your next appointment.     As a Provider, I will:  Listen carefully to your concerns and treat you with respect.   Recommend a treatment plan that I believe is in your best interest. This plan may involve therapies other than opioid pain medication.   Talk with you often about the possible benefits, and the risk of harm of any medicine that we prescribe for you.   Provide a plan on how to taper (discontinue or go off) using this medicine if the decision is made to stop its use.    As a Patient, I understand that opioid(s):   Are a controlled substance prescribed by my care team to help me function or work and manage my condition(s).   Are strong medicines and can cause serious side effects such as:  Drowsiness, which can seriously affect my driving ability  A lower breathing rate, enough to cause death  Harm to my thinking ability   Depression   Abuse of and addiction to this medicine  Need to be taken exactly as prescribed. Combining opioids with certain medicines or chemicals (such as illegal drugs, sedatives, sleeping pills, and benzodiazepines) can be dangerous or even fatal. If I stop opioids suddenly, I may have severe withdrawal symptoms.  Do not work for all types of pain nor for all patients. If they re not helpful, I may be asked to stop  them.    {Benzo / Stimulant (Optional):335204}    The risks, benefits and side effects of these medicine(s) were explained to me. I agree that:  I will take part in other treatments as advised by my care team. This may be psychiatry or counseling, physical therapy, behavioral therapy, group treatment or a referral to a specialist.     I will keep all my appointments. I understand that this is part of the monitoring of opioids. My care team may require an office visit for EVERY opioid/controlled substance refill. If I miss appointments or don t follow instructions, my care team may stop my medicine.    I will take my medicines as prescribed. I will not change the dose or schedule unless my care team tells me to. There will be no refills if I run out early.     I may be asked to come to the clinic and complete a urine drug test or complete a pill count at any time. If I don t give a urine sample or participate in a pill count, the care team may stop my medicine.    I will only receive prescriptions from this clinic for chronic pain. If I am treated by another provider for acute pain issues, I will tell them that I am taking opioid pain medication for chronic pain and that I have a treatment agreement with this provider. I will inform my [facility] care team within one business day if I am given a prescription for any pain medication by another healthcare provider. My [facility] care team can contact other providers and pharmacists about my use of any medicines.    It is up to me to make sure that I don t run out of my medicines on weekends or holidays. If my care team is willing to refill my opioid prescription without a visit, I must request refills only during office hours. Refills may take up to 3 business days to process. I will use one pharmacy to fill all my opioid and other controlled substance prescriptions. I will notify the clinic about any changes to my insurance or medication  availability.    I am responsible for my prescriptions. If the medicine/prescription is lost, stolen or destroyed, it will not be replaced. I also agree not to share controlled substance medicines with anyone.    I am aware I should not use any illegal or recreational drugs. I agree not to drink alcohol unless my care team says I can.       If I enroll in the [place] Medical Cannabis program, I will tell my care team prior to my next refill.     I will tell my care team right away if I become pregnant, have a new medical problem treated outside of my regular clinic, or have a change in my medications.    I understand that this medicine can affect my thinking, judgment and reaction time. Alcohol and drugs affect the brain and body, which can affect the safety of my driving. Being under the influence of alcohol or drugs can affect my decision-making, behaviors, personal safety, and the safety of others. Driving while impaired (DWI) can occur if a person is driving, operating, or in physical control of a car, motorcycle, boat, snowmobile, ATV, motorbike, off-road vehicle, or any other motor vehicle ([place] Statute 169A.20). I understand the risk if I choose to drive or operate any vehicle or machinery.    I understand that if I do not follow any of the conditions above, my prescriptions or treatment may be stopped or changed.          Opioids  What You Need to Know    What are opioids?   Opioids are pain medicines that must be prescribed by a doctor. They are also known as narcotics.     Examples are:   morphine (MS Contin, Sarah)  oxycodone (Oxycontin)  oxycodone and acetaminophen (Percocet)  hydrocodone and acetaminophen (Vicodin, Norco)   fentanyl patch (Duragesic)   hydromorphone (Dilaudid)   methadone  codeine (Tylenol #3)     What do opioids do well?   Opioids are best for severe short-term pain such as after a surgery or injury. They may work well for cancer pain. They may help some people with long-lasting  (chronic) pain.     What do opioids NOT do well?   Opioids never get rid of pain entirely, and they don t work well for most patients with chronic pain. Opioids don t reduce swelling, one of the causes of pain.                                    Other ways to manage chronic pain and improve function include:     Treat the health problem that may be causing pain  Anti-inflammation medicines, which reduce swelling and tenderness, such as ibuprofen (Advil, Motrin) or naproxen (Aleve)  Acetaminophen (Tylenol)  Antidepressants and anti-seizure medicines, especially for nerve pain  Topical treatments such as patches or creams  Injections or nerve blocks  Chiropractic or osteopathic treatment  Acupuncture, massage, deep breathing, meditation, visual imagery, aromatherapy  Use heat or ice at the pain site  Physical therapy   Exercise  Stop smoking  Take part in therapy       Risks and side effects     Talk to your doctor before you start or decide to keep taking opioids. Possible side effects include:    Lowering your breathing rate enough to cause death  Overdose, including death, especially if taking higher than prescribed doses  Worse depression symptoms; less pleasure in things you usually enjoy  Feeling tired or sluggish  Slower thoughts or cloudy thinking  Being more sensitive to pain over time; pain is harder to control  Trouble sleeping or restless sleep  Changes in hormone levels (for example, less testosterone)  Changes in sex drive or ability to have sex  Constipation  Unsafe driving  Itching and sweating  Dizziness  Nausea, throwing up and dry mouth    What else should I know about opioids?    Opioids may lead to dependence, tolerance, or addiction.    Dependence means that if you stop or reduce the medicine too quickly, you will have withdrawal symptoms. These include loose poop (diarrhea), jitters, flu-like symptoms, nervousness and tremors. Dependence is not the same as addiction.                      Tolerance means needing higher doses over time to get the same effect. This may increase the chance of serious side effects.    Addiction is when people improperly use a substance that harms their body, their mind or their relations with others. Use of opiates can cause a relapse of addiction if you have a history of drug or alcohol abuse.    People who have used opioids for a long time may have a lower quality of life, worse depression, higher levels of pain and more visits to doctors.    You can overdose on opioids. Take these steps to lower your risk of overdose:    Recognize the signs:  Signs of overdose include decrease or loss of consciousness (blackout), slowed breathing, trouble waking up and blue lips. If someone is worried about overdose, they should call 911.    Talk to your doctor about Narcan (naloxone).   If you are at risk for overdose, you may be given a prescription for Narcan. This medicine very quickly reverses the effects of opioids.   If you overdose, a friend or family member can give you Narcan while waiting for the ambulance. They need to know the signs of overdose and how to give Narcan.     Don't use alcohol or street drugs.   Taking them with opioids can cause death.    Do not take any of these medicines unless your doctor says it s OK. Taking these with opioids can cause death:  Benzodiazepines, such as lorazepam (Ativan), alprazolam (Xanax) or diazepam (Valium)  Muscle relaxers, such as cyclobenzaprine (Flexeril)  Sleeping pills like zolpidem (Ambien)   Other opioids      How to keep you and other people safe while taking opioids:    Never share your opioids with others.  Opioid medicines are regulated by the Drug Enforcement Agency (VENANCIO). Selling or sharing medications is a criminal act.    2. Be sure to store opioids in a secure place, locked up if possible. Young children can easily swallow them and overdose.    3. When you are traveling with your medicines, keep them in the  original bottles. If you use a pill box, be sure you also carry a copy of your medicine list from your clinic or pharmacy.    4. Safe disposal of opioids    Most pharmacies have places to get rid of medicine, called disposal kiosks. Medicine disposal options are also available in every [facility]. Search your county and  medication disposal  to find more options. You can find more details at:  https://www.pca.[facility].[email or web address]./living-green/managing-unwanted-medications     I agree that my provider, clinic care team, and pharmacy may work with any city, state or federal law enforcement agency that investigates the misuse, sale, or other diversion of my controlled medicine. I will allow my provider to discuss my care with, or share a copy of, this agreement with any other treating provider, pharmacy or emergency room where I receive care.    I have read this agreement and have asked questions about anything I did not understand.    _______________________________________________________  Patient Signature - Nathalie Bashir _____________________                   Date     _______________________________________________________  Provider Signature - NINA Lott CNP   _____________________                   Date     _______________________________________________________  Witness Signature (required if provider not present while patient signing)   _____________________                   Date

## 2023-11-03 NOTE — PATIENT INSTRUCTIONS
[facility] Number:  [phone]  Call with any questions about your care and for scheduling assistance.   Calls are returned Monday through Friday between 8 AM and 4:30 PM. We usually get back to you within 2 business days depending on the issue/request.    If we are prescribing your medications:  For opioid medication refills, call the clinic or send a Slycehart message 7 days in advance.  Please include:  Name of requested medication  Name of the pharmacy.  For non-opioid medications, call your pharmacy directly to request a refill. Please allow 3-4 days to be processed.   Per [place] State Law:  All controlled substance prescriptions must be filled within 30 days of being written.    For those controlled substances allowing refills, pickup must occur within 30 days of last fill.      We believe regular attendance is key to your success in our program!    Any time you are unable to keep your appointment we ask that you call us at least 24 hours in advance to cancel.This will allow us to offer the appointment time to another patient.   Multiple missed appointments may lead to dismissal from the clinic.     Plan:  A multimodal plan was developed today to treat your pain.  Multimodal analgesia is a strategy that reduces reliance on opioids through the use of non-opioid analgesics and therapies that have different mechanisms of action.    Will obtain UDS and opioid agreement today.      Diagnostics: lumbar MRI, XRAY R) shoulder, XRAY sacrum/hip were reviewed today.        Medications:  No changes to medications today.    Discussed medical cannabis.    The following OTC pain medications may be helpful, use as directed: Voltaren Gel 1%, CBD products, Arnica products, Capsaicin products, Australian Dream Cream, Epson It,  Lidocaine Patch, Solanpas, Biofreeze, Aspercream, Tiger Balm and Alejandro Emu cream.  Apply heat or cold PRN.      Therapies:  Discussed anti-inflammatory  lifestyle.    Discussed Pain Psychology - consider in the future  Psychological treatments are also important part of pain management.  Understanding and managing the thoughts, emotions and behaviors that accompany the discomfort can help you cope more effectively with your pain and can actually reduce the intensity of your pain.      PHYSICAL THERAPY - referral place today for R) shoulder and lumbar spine       Discussed the importance of core strengthening, ROM, stretching exercises with the patient and how each of these entities is important in decreasing pain.  Explained to the patient that the purpose of physical therapy is to teach the patient a home exercise program.  These exercises need to be performed every day in order to decrease pain and prevent future occurrences of pain.        Discussed Grounding Mat - handout provided  https://www.Clipmarks.com/watch?v=MvXTUL2Vb7O    Discussed Frequency Specific Microcurrent - handout provided  Treatment for Neuropathic Pain.   [facility] [phone]  [facility] [phone]  May be able to be billed as a chiropractic service depending on your insurance coverage.     Discussed Acupuncture.    Discussed Z[facility]nex TENs unit.    Interventions:   She started treatment for lumbar spine with Dr. Urbina and needs to follow up at his clinic.        Follow up:     2 weeks after R) shoulder MRI to review and make further recommendations.    UDS and opioid agreement today and will review and if appropriate assume pain management.    She started treatment for lumbar spine with Dr. Urbina and needs to follow up at his clinic.    NINA Santana, RN, CNP, FNP  [facility] [place]

## 2023-11-07 NOTE — PROGRESS NOTES
Palliative Care Progress Note    Patient Name: Nathalie Bashir  Primary Provider: Alban Yuan    Chief Complaint/Patient ID: Nathalie Bashir is a 59 yo F with hx of gastric adenocarcinoma              - Found during [facility] 2020 for presumed IPMN - no pancreatic malignancy found, but had poorly differentiated adenoca.  Received neoadjuvant chemo (5FU, leucovorin, oxaliplatin, docetaxel) with curative-intent subtotal gastrectomy, David-en-Y gastrojejunostomy 10/2020.  Required stenting of the jejunum for stenosis causing pain, removed 21. MOISES at this time.     Chronic and chemo-induced neuropathy - intol of gabapentin, pregabalin. Duloxetine somewhat effective, on TID/QID oxycodone       Last Palliative care appointment: 23 with me. Again discussed transitioning chronic pain management to a different clinician in setting of no ongoing cancer-related pain. Increased Celexa to 30mg daily.     Reviewed: Yes    Interim History:  Nathalie Bashir is a 58 year old female who is seen today for follow up with Palliative Care via billable video visit.     Reviewed Rheumatology note from 23. Work up for inflammatory joint and connective tissue diseases.     Reviewed pain management note from 11/3.  Per documentation, they plan to obtain a UDS and opioid agreement that day.    Overall she's doing OK. After her father-in-law , her mother-in-law had some addittional behaviors toward Nathalie that were unkind/inappropriate, so Nathalie has been better about setting boundaries including not over-extending herself for her mother-in-law.    She feels the 30mg of Celexa has been helpful and would like to continue this dose.    She says all her labs came back normal, and the pain management team will be taking over prescribing her pain regimen.    She admits she has not made further progress on her healthcare directive.       Social History:  Lives with her . Has a son and 3 grandchildren  Retired, worked  in special education.  Social History     Tobacco Use    Smoking status: Former     Packs/day: 0.50     Years: 25.00     Additional pack years: 0.00     Total pack years: 12.50     Types: Cigarettes     Quit date: 1/1/2020     Years since quitting: 3.8     Passive exposure: Past    Smokeless tobacco: Never   Substance Use Topics    Alcohol use: No    Drug use: No       Advanced Care Planning: Currently working on healthcare directive but has not completed it.  States her  would be her HCA.     Family History- Reviewed in Epic.    Medications- Reviewed in Epic.    Past Medical History- Reviewed in [facility].    Past Surgical History- Reviewed in Epic.    Physical Exam:   Constitutional: Alert, pleasant, no apparent distress. Sitting up in chair.  Eyes: Sclera non-icteric, no eye discharge.  ENT: No nasal discharge. Ears grossly normal.  Respiratory: Unlabored respirations. Speaking in full sentences.  Musculoskeletal: Extremities appear normal- no gross deformities noted. No edema noted on upper body.   Skin: No suspicious lesions or rashes on visible skin.  Neurologic: Clear speech, no aphasia. No facial droop.  Psychiatric: Mentation appears normal, appropriate attention. Affect normal/bright. Does not appear anxious or depressed.    Key Data Reviewed:  LABS: 9/5/23- Cr 0.93, GFR 72, Albumin 4.1, LFTs wnl.   8/30/23- WBC 6.1, Hgb 11.8, Plts 232.       Impression & Recommendations & Counseling:  Nathalie Bashir is a 58 year old female with history of gastric adenocarcinoma, s/p neoadjuvant chemotherapy and curative intent surgery 10/2020.  Is now on surveillance. Ongoing chronic non cancer-related pain.     Pain - Multifactorial. Neuropathic pain is a side effect from chemo, EMG without any evidence of lumbosacral radiculopathy.  She also seems to have some ongoing joint pains involving her ankles, knees, sacrum, and possibly her neck. We have discussed that with chronic pain with no evidence of ongoing cancer,  palliative care transitions pain management either to primary care or a pain specialist. She has met and establisher with pain management team.  -She is transitioning to the Pain team for ongoing refills and management of her chronic pain.    MDD - On Celexa 30mg daily now. Feels much more stable than she did on the Cymbalta.  -Continue Celexa 30mg daily. I sent enough for 6 months to ensure she has enough until she can get in with her PCP.     ACP - Has not made progress on healthcare directive with all the other things going on in her life.  -Advised to work on one questions daily, which will allow her to complete the paperwork within a couple weeks.      Follow up: As needed. She knows she can reach out at any time in the future.    Video-Visit Details  Video Start Time: 9:17AM  Video End Time: 9:36AM    Originating Location (pt. Location): Home     Distant Location (provider location):  Offsite- Personal Home     Platform used for Video Visit: Miguel A     Total time spent on day of encounter is 29 mins, including reviewing record, review of above studies, above visit with patient, symptomatic discussion as above, including medication adjustments/prescription management, and documentation.     Karla Millard, DO  Palliative Medicine   AMCOM ID [id number]    Some chart documentation performed using Dragon Voice recognition Software. Although reviewed after completion, some words and grammatical errors may remain.

## 2023-11-08 ENCOUNTER — VIRTUAL VISIT (OUTPATIENT)
Dept: ONCOLOGY | Facility: CLINIC | Age: 58
End: 2023-11-08
Attending: STUDENT IN AN ORGANIZED HEALTH CARE EDUCATION/TRAINING PROGRAM
Payer: MEDICARE

## 2023-11-08 VITALS — WEIGHT: 172 LBS | BODY MASS INDEX: 26.07 KG/M2 | HEIGHT: 68 IN

## 2023-11-08 DIAGNOSIS — Z85.028 HISTORY OF GASTRIC CANCER: ICD-10-CM

## 2023-11-08 DIAGNOSIS — Z51.5 ENCOUNTER FOR PALLIATIVE CARE: ICD-10-CM

## 2023-11-08 DIAGNOSIS — F33.41 MDD (MAJOR DEPRESSIVE DISORDER), RECURRENT, IN PARTIAL REMISSION (H): ICD-10-CM

## 2023-11-08 DIAGNOSIS — M25.50 MULTIPLE JOINT PAIN: Primary | ICD-10-CM

## 2023-11-08 DIAGNOSIS — F43.9 STRESS: ICD-10-CM

## 2023-11-08 DIAGNOSIS — Z71.89 ADVANCED CARE PLANNING/COUNSELING DISCUSSION: ICD-10-CM

## 2023-11-08 PROCEDURE — 99213 OFFICE O/P EST LOW 20 MIN: CPT | Mod: VID | Performed by: STUDENT IN AN ORGANIZED HEALTH CARE EDUCATION/TRAINING PROGRAM

## 2023-11-08 RX ORDER — CITALOPRAM HYDROBROMIDE 20 MG/1
30 TABLET ORAL DAILY
Qty: 135 TABLET | Refills: 1 | Status: SHIPPED | OUTPATIENT
Start: 2023-11-08

## 2023-11-08 ASSESSMENT — PAIN SCALES - GENERAL: PAINLEVEL: MODERATE PAIN (4)

## 2023-11-08 NOTE — NURSING NOTE
Is the patient currently in the state of [place]? YES    Visit mode:VIDEO    If the visit is dropped, the patient can be reconnected by: VIDEO VISIT: Text to cell phone:   Telephone Information:   Mobile [phone]       Will anyone else be joining the visit? NO  (If patient encounters technical issues they should call [phone]    How would you like to obtain your AVS? MyChart    Are changes needed to the allergy or medication list? No    Reason for visit: SKY CHIN

## 2023-11-08 NOTE — LETTER
2023         RE: Nathalie Bashir  [address] [facility] [place] [address]        Dear Colleague,    Thank you for referring your patient, Nathalie Bashir, to the [facility]. Please see a copy of my visit note below.    Palliative Care Progress Note    Patient Name: Nathalie Bashir  Primary Provider: Alban Yuan    Chief Complaint/Patient ID: Nathalie Bashir is a 57 yo F with hx of gastric adenocarcinoma              - Found during [facility] 2020 for presumed IPMN - no pancreatic malignancy found, but had poorly differentiated adenoca.  Received neoadjuvant chemo (5FU, leucovorin, oxaliplatin, docetaxel) with curative-intent subtotal gastrectomy, David-en-Y gastrojejunostomy 10/2020.  Required stenting of the jejunum for stenosis causing pain, removed 21. MOISES at this time.     Chronic and chemo-induced neuropathy - intol of gabapentin, pregabalin. Duloxetine somewhat effective, on TID/QID oxycodone       Last Palliative care appointment: 23 with me. Again discussed transitioning chronic pain management to a different clinician in setting of no ongoing cancer-related pain. Increased Celexa to 30mg daily.     Reviewed: Yes    Interim History:  Nathalie Bashir is a 58 year old female who is seen today for follow up with Palliative Care via billable video visit.     Reviewed Rheumatology note from 23. Work up for inflammatory joint and connective tissue diseases.     Reviewed pain management note from 11/3.  Per documentation, they plan to obtain a UDS and opioid agreement that day.    Overall she's doing OK. After her father-in-law , her mother-in-law had some addittional behaviors toward Nathalie that were unkind/inappropriate, so Nathalie has been better about setting boundaries including not over-extending herself for her mother-in-law.    She feels the 30mg of Celexa has been helpful and would like to continue this dose.    She says all her labs came back  normal, and the pain management team will be taking over prescribing her pain regimen.    She admits she has not made further progress on her healthcare directive.       Social History:  Lives with her . Has a son and 3 grandchildren  Retired, worked in special education.  Social History     Tobacco Use     Smoking status: Former     Packs/day: 0.50     Years: 25.00     Additional pack years: 0.00     Total pack years: 12.50     Types: Cigarettes     Quit date: 1/1/2020     Years since quitting: 3.8     Passive exposure: Past     Smokeless tobacco: Never   Substance Use Topics     Alcohol use: No     Drug use: No       Advanced Care Planning: Currently working on healthcare directive but has not completed it.  States her  would be her HCA.     Family History- Reviewed in Epic.    Medications- Reviewed in Epic.    Past Medical History- Reviewed in [employer].    Past Surgical History- Reviewed in Epic.    Physical Exam:   Constitutional: Alert, pleasant, no apparent distress. Sitting up in chair.  Eyes: Sclera non-icteric, no eye discharge.  ENT: No nasal discharge. Ears grossly normal.  Respiratory: Unlabored respirations. Speaking in full sentences.  Musculoskeletal: Extremities appear normal- no gross deformities noted. No edema noted on upper body.   Skin: No suspicious lesions or rashes on visible skin.  Neurologic: Clear speech, no aphasia. No facial droop.  Psychiatric: Mentation appears normal, appropriate attention. Affect normal/bright. Does not appear anxious or depressed.    Key Data Reviewed:  LABS: 9/5/23- Cr 0.93, GFR 72, Albumin 4.1, LFTs wnl.   8/30/23- WBC 6.1, Hgb 11.8, Plts 232.       Impression & Recommendations & Counseling:  Nathalie Bashir is a 58 year old female with history of gastric adenocarcinoma, s/p neoadjuvant chemotherapy and curative intent surgery 10/2020.  Is now on surveillance. Ongoing chronic non cancer-related pain.     Pain - Multifactorial. Neuropathic pain is a side  effect from chemo, EMG without any evidence of lumbosacral radiculopathy.  She also seems to have some ongoing joint pains involving her ankles, knees, sacrum, and possibly her neck. We have discussed that with chronic pain with no evidence of ongoing cancer, palliative care transitions pain management either to primary care or a pain specialist. She has met and establisher with pain management team.  -She is transitioning to the Pain team for ongoing refills and management of her chronic pain.    MDD - On Celexa 30mg daily now. Feels much more stable than she did on the Cymbalta.  -Continue Celexa 30mg daily. I sent enough for 6 months to ensure she has enough until she can get in with her PCP.     ACP - Has not made progress on healthcare directive with all the other things going on in her life.  -Advised to work on one questions daily, which will allow her to complete the paperwork within a couple weeks.      Follow up: As needed. She knows she can reach out at any time in the future.    Video-Visit Details  Video Start Time: 9:17AM  Video End Time: 9:36AM    Originating Location (pt. Location): Home     Distant Location (provider location):  Offsite- Personal Home     Platform used for Video Visit: [employer]     Total time spent on day of encounter is 29 mins, including reviewing record, review of above studies, above visit with patient, symptomatic discussion as above, including medication adjustments/prescription management, and documentation.     Karla Millard DO  Palliative Medicine   AMCOM ID [id number]    Some chart documentation performed using Dragon Voice recognition Software. Although reviewed after completion, some words and grammatical errors may remain.      Again, thank you for allowing me to participate in the care of your patient.        Sincerely,        Karla Millard DO

## 2023-11-08 NOTE — PATIENT INSTRUCTIONS
Recommendations:  -Continue Celexa at 30mg daily. I put enough refills on this for a total of 6 months to last until you can see your PCP.  -Do one question on your healthcare directive daily, and you'll work your way through it within a couple weeks.    Follow up: As needed.      Reasons to Call    If you are having worsening/uncontrolled symptoms we want you to call!    You or your other physicians make any changes to medications we have prescribed.  -Please call for refills 4-5 days before you will run out of medication.    Important Phone Numbers, including: Refills, scheduling, and general questions     Palliative Care RN: Astrid Santiago - [phone]  *After hours or on weekends. Will connect you with on call MD. [phone]

## 2023-11-08 NOTE — LETTER
2023         RE: Nathalie Bashir  [address] [facility] [address]        Dear Colleague,    Thank you for referring your patient, Nathalie Bashir, to the [facility]. Please see a copy of my visit note below.    Palliative Care Progress Note    Patient Name: Nathalie Bashir  Primary Provider: Alban Yuan    Chief Complaint/Patient ID: Nathalie Bashir is a 57 yo F with hx of gastric adenocarcinoma              - Found during [facility] 2020 for presumed IPMN - no pancreatic malignancy found, but had poorly differentiated adenoca.  Received neoadjuvant chemo (5FU, leucovorin, oxaliplatin, docetaxel) with curative-intent subtotal gastrectomy, David-en-Y gastrojejunostomy 10/2020.  Required stenting of the jejunum for stenosis causing pain, removed 21. MOISES at this time.     Chronic and chemo-induced neuropathy - intol of gabapentin, pregabalin. Duloxetine somewhat effective, on TID/QID oxycodone       Last Palliative care appointment: 23 with me. Again discussed transitioning chronic pain management to a different clinician in setting of no ongoing cancer-related pain. Increased Celexa to 30mg daily.     Reviewed: Yes    Interim History:  Nathalie Bashir is a 58 year old female who is seen today for follow up with Palliative Care via billable video visit.     Reviewed Rheumatology note from 23. Work up for inflammatory joint and connective tissue diseases.     Reviewed pain management note from 11/3.  Per documentation, they plan to obtain a UDS and opioid agreement that day.    Overall she's doing OK. After her father-in-law , her mother-in-law had some addittional behaviors toward Nathalie that were unkind/inappropriate, so Nathalie has been better about setting boundaries including not over-extending herself for her mother-in-law.    She feels the 30mg of Celexa has been helpful and would like to continue this dose.    She says all her labs came back  normal, and the pain management team will be taking over prescribing her pain regimen.    She admits she has not made further progress on her healthcare directive.       Social History:  Lives with her . Has a son and 3 grandchildren  Retired, worked in special education.  Social History     Tobacco Use     Smoking status: Former     Packs/day: 0.50     Years: 25.00     Additional pack years: 0.00     Total pack years: 12.50     Types: Cigarettes     Quit date: 1/1/2020     Years since quitting: 3.8     Passive exposure: Past     Smokeless tobacco: Never   Substance Use Topics     Alcohol use: No     Drug use: No       Advanced Care Planning: Currently working on healthcare directive but has not completed it.  States her  would be her HCA.     Family History- Reviewed in Epic.    Medications- Reviewed in Epic.    Past Medical History- Reviewed in [employer].    Past Surgical History- Reviewed in Epic.    Physical Exam:   Constitutional: Alert, pleasant, no apparent distress. Sitting up in chair.  Eyes: Sclera non-icteric, no eye discharge.  ENT: No nasal discharge. Ears grossly normal.  Respiratory: Unlabored respirations. Speaking in full sentences.  Musculoskeletal: Extremities appear normal- no gross deformities noted. No edema noted on upper body.   Skin: No suspicious lesions or rashes on visible skin.  Neurologic: Clear speech, no aphasia. No facial droop.  Psychiatric: Mentation appears normal, appropriate attention. Affect normal/bright. Does not appear anxious or depressed.    Key Data Reviewed:  LABS: 9/5/23- Cr 0.93, GFR 72, Albumin 4.1, LFTs wnl.   8/30/23- WBC 6.1, Hgb 11.8, Plts 232.       Impression & Recommendations & Counseling:  Nathalie Bashir is a 58 year old female with history of gastric adenocarcinoma, s/p neoadjuvant chemotherapy and curative intent surgery 10/2020.  Is now on surveillance. Ongoing chronic non cancer-related pain.     Pain - Multifactorial. Neuropathic pain is a side  effect from chemo, EMG without any evidence of lumbosacral radiculopathy.  She also seems to have some ongoing joint pains involving her ankles, knees, sacrum, and possibly her neck. We have discussed that with chronic pain with no evidence of ongoing cancer, palliative care transitions pain management either to primary care or a pain specialist. She has met and establisher with pain management team.  -She is transitioning to the Pain team for ongoing refills and management of her chronic pain.    MDD - On Celexa 30mg daily now. Feels much more stable than she did on the Cymbalta.  -Continue Celexa 30mg daily. I sent enough for 6 months to ensure she has enough until she can get in with her PCP.     ACP - Has not made progress on healthcare directive with all the other things going on in her life.  -Advised to work on one questions daily, which will allow her to complete the paperwork within a couple weeks.      Follow up: As needed. She knows she can reach out at any time in the future.    Video-Visit Details  Video Start Time: 9:17AM  Video End Time: 9:36AM    Originating Location (pt. Location): Home     Distant Location (provider location):  Offsite- Personal Home     Platform used for Video Visit: RNDOMN     Total time spent on day of encounter is 29 mins, including reviewing record, review of above studies, above visit with patient, symptomatic discussion as above, including medication adjustments/prescription management, and documentation.     Karla Millard DO  Palliative Medicine   AMCOM ID [id number]    Some chart documentation performed using Dragon Voice recognition Software. Although reviewed after completion, some words and grammatical errors may remain.      Again, thank you for allowing me to participate in the care of your patient.        Sincerely,        Karla Millard DO

## 2023-11-09 ENCOUNTER — TELEPHONE (OUTPATIENT)
Dept: PALLIATIVE MEDICINE | Facility: OTHER | Age: 58
End: 2023-11-09
Payer: MEDICARE

## 2023-11-09 NOTE — TELEPHONE ENCOUNTER
Called patient back to update Dr. Urbina's review of the results:     ----- Message -----  From: Maria Victoria Urbina MD  Sent: 11/9/2023   3:17 PM CST  To: Dorene Springer RN    If she only had 60% improvement then we would consider this a negative block and not proceed w/ MBB#2.       Patient did not answer, she had advised it was okay when speaking with her previously that it was okay to leave a detailed message if she did not answer, so left a detailed message with this information. Advised patient she may call back the clinic or send a My Chart message if she has any further questions.     RENNY LeiN RN  RN Care Coordinator for Physical Medicine and Rehabilitation  [facility] [address] [address]  Office: [phone]  Fax: [phone]

## 2023-11-09 NOTE — TELEPHONE ENCOUNTER
[facility] Call Center    Phone Message    May a detailed message be left on voicemail: yes     Reason for Call: Other: Patient called wanting to schedule another injection. She got the first injection with Dr. Urbina, but does also see Desi.  Writer unsure who to send this to.  Patient requesting a call back.     Action Taken: Message routed to:  Other: [facility] Pain    Travel Screening: Not Applicable

## 2023-11-09 NOTE — TELEPHONE ENCOUNTER
Patient had diagnostic injection Bilateral L4-5, L5-S1 Medial Branch Block #1 completed 9/27/23 with Dr. Urbina.    Injection procedure notes state:     Follow-up will be Determined after pain diary/ block sheet reviewed.     Pain Diary with summary of results was not received by Dr. Urbina to review to see if okay to proceed to the diagnostic MBB # 2 in the series.     Called patient to review results to get sent over to Dr. Urbina for his review. Patient reports she had at least 60% improvement in pain after the injection for the rest of that day. She did state that she felt like it lasted longer than even that first day with the pain relief and is hopeful she can get the second injection. Felt she was able to move so much better and overall discomfort felt like it improved after that for almost a month. She is closest to [facility], so that would be preferred injection location if able to be completed there, otherwise her 2nd choice would be at [facility] location. Patient stated it was okay to leave a detailed message if she does not answer the phone.    Routing to Dr. Urbina to review and place order for Bilateral L4-5, L5-S1 Medial Branch Block # 2 if appropriate.     Dorene Springer, RENNYN RN  RN Care Coordinator for Physical Medicine and Rehabilitation  [facility] [place] - [address] [address]  Office: [phone]  Fax: [phone]

## 2023-11-13 ENCOUNTER — ANCILLARY PROCEDURE (OUTPATIENT)
Dept: CT IMAGING | Facility: CLINIC | Age: 58
End: 2023-11-13
Attending: INTERNAL MEDICINE
Payer: MEDICARE

## 2023-11-13 ENCOUNTER — LAB (OUTPATIENT)
Dept: LAB | Facility: CLINIC | Age: 58
End: 2023-11-13
Payer: MEDICARE

## 2023-11-13 VITALS
HEART RATE: 93 BPM | OXYGEN SATURATION: 99 % | SYSTOLIC BLOOD PRESSURE: 109 MMHG | RESPIRATION RATE: 18 BRPM | TEMPERATURE: 98.4 F | DIASTOLIC BLOOD PRESSURE: 63 MMHG | BODY MASS INDEX: 26.56 KG/M2 | WEIGHT: 174.7 LBS

## 2023-11-13 DIAGNOSIS — C16.3 MALIGNANT NEOPLASM OF PYLORIC ANTRUM (H): ICD-10-CM

## 2023-11-13 LAB
ALBUMIN SERPL BCG-MCNC: 3.9 G/DL (ref 3.5–5.2)
ALP SERPL-CCNC: 101 U/L (ref 35–104)
ALT SERPL W P-5'-P-CCNC: 9 U/L (ref 0–50)
ANION GAP SERPL CALCULATED.3IONS-SCNC: 9 MMOL/L (ref 7–15)
AST SERPL W P-5'-P-CCNC: 23 U/L (ref 0–45)
BASOPHILS # BLD AUTO: 0.1 10E3/UL (ref 0–0.2)
BASOPHILS NFR BLD AUTO: 1 %
BILIRUB SERPL-MCNC: 0.2 MG/DL
BUN SERPL-MCNC: 9 MG/DL (ref 6–20)
CALCIUM SERPL-MCNC: 8.7 MG/DL (ref 8.6–10)
CHLORIDE SERPL-SCNC: 104 MMOL/L (ref 98–107)
CREAT SERPL-MCNC: 0.87 MG/DL (ref 0.51–0.95)
DEPRECATED HCO3 PLAS-SCNC: 25 MMOL/L (ref 22–29)
EGFRCR SERPLBLD CKD-EPI 2021: 77 ML/MIN/1.73M2
EOSINOPHIL # BLD AUTO: 0.1 10E3/UL (ref 0–0.7)
EOSINOPHIL NFR BLD AUTO: 1 %
ERYTHROCYTE [DISTWIDTH] IN BLOOD BY AUTOMATED COUNT: 13.2 % (ref 10–15)
GLUCOSE SERPL-MCNC: 95 MG/DL (ref 70–99)
HCT VFR BLD AUTO: 38.1 % (ref 35–47)
HGB BLD-MCNC: 12.7 G/DL (ref 11.7–15.7)
IMM GRANULOCYTES # BLD: 0 10E3/UL
IMM GRANULOCYTES NFR BLD: 0 %
LYMPHOCYTES # BLD AUTO: 2.2 10E3/UL (ref 0.8–5.3)
LYMPHOCYTES NFR BLD AUTO: 25 %
MCH RBC QN AUTO: 30.6 PG (ref 26.5–33)
MCHC RBC AUTO-ENTMCNC: 33.3 G/DL (ref 31.5–36.5)
MCV RBC AUTO: 92 FL (ref 78–100)
MONOCYTES # BLD AUTO: 0.5 10E3/UL (ref 0–1.3)
MONOCYTES NFR BLD AUTO: 6 %
NEUTROPHILS # BLD AUTO: 5.9 10E3/UL (ref 1.6–8.3)
NEUTROPHILS NFR BLD AUTO: 67 %
NRBC # BLD AUTO: 0 10E3/UL
NRBC BLD AUTO-RTO: 0 /100
PLATELET # BLD AUTO: 241 10E3/UL (ref 150–450)
POTASSIUM SERPL-SCNC: 4.2 MMOL/L (ref 3.4–5.3)
PROT SERPL-MCNC: 6.5 G/DL (ref 6.4–8.3)
RBC # BLD AUTO: 4.15 10E6/UL (ref 3.8–5.2)
SODIUM SERPL-SCNC: 138 MMOL/L (ref 135–145)
WBC # BLD AUTO: 8.7 10E3/UL (ref 4–11)

## 2023-11-13 PROCEDURE — 85025 COMPLETE CBC W/AUTO DIFF WBC: CPT

## 2023-11-13 PROCEDURE — 80053 COMPREHEN METABOLIC PANEL: CPT

## 2023-11-13 PROCEDURE — 250N000011 HC RX IP 250 OP 636: Mod: JZ | Performed by: INTERNAL MEDICINE

## 2023-11-13 PROCEDURE — 74177 CT ABD & PELVIS W/CONTRAST: CPT | Mod: MG | Performed by: RADIOLOGY

## 2023-11-13 PROCEDURE — G1010 CDSM STANSON: HCPCS | Performed by: RADIOLOGY

## 2023-11-13 PROCEDURE — 36591 DRAW BLOOD OFF VENOUS DEVICE: CPT

## 2023-11-13 PROCEDURE — 71260 CT THORAX DX C+: CPT | Mod: MG | Performed by: RADIOLOGY

## 2023-11-13 RX ORDER — HEPARIN SODIUM (PORCINE) LOCK FLUSH IV SOLN 100 UNIT/ML 100 UNIT/ML
5 SOLUTION INTRAVENOUS EVERY 8 HOURS
Status: DISCONTINUED | OUTPATIENT
Start: 2023-11-13 | End: 2023-11-19 | Stop reason: HOSPADM

## 2023-11-13 RX ORDER — IOPAMIDOL 755 MG/ML
88 INJECTION, SOLUTION INTRAVASCULAR ONCE
Status: COMPLETED | OUTPATIENT
Start: 2023-11-13 | End: 2023-11-13

## 2023-11-13 RX ORDER — HEPARIN SODIUM (PORCINE) LOCK FLUSH IV SOLN 100 UNIT/ML 100 UNIT/ML
500 SOLUTION INTRAVENOUS ONCE
Status: COMPLETED | OUTPATIENT
Start: 2023-11-13 | End: 2023-11-13

## 2023-11-13 RX ADMIN — HEPARIN SODIUM (PORCINE) LOCK FLUSH IV SOLN 100 UNIT/ML 500 UNITS: 100 SOLUTION at 11:53

## 2023-11-13 RX ADMIN — IOPAMIDOL 88 ML: 755 INJECTION, SOLUTION INTRAVASCULAR at 11:44

## 2023-11-13 RX ADMIN — Medication 5 ML: at 11:22

## 2023-11-13 ASSESSMENT — PAIN SCALES - PAIN ENJOYMENT GENERAL ACTIVITY SCALE (PEG)
INTERFERED_GENERAL_ACTIVITY: 6
AVG_PAIN_PASTWEEK: 5
INTERFERED_ENJOYMENT_LIFE: 6
PEG_TOTALSCORE: 5.67

## 2023-11-13 ASSESSMENT — PAIN SCALES - GENERAL: PAINLEVEL: MODERATE PAIN (5)

## 2023-11-13 NOTE — NURSING NOTE
Chief Complaint   Patient presents with    Port Draw     Power needle. Heparin locked, vitals checked     Emma Rivera RN on 11/13/2023 at 11:24 AM

## 2023-11-13 NOTE — DISCHARGE INSTRUCTIONS

## 2023-11-16 ENCOUNTER — VIRTUAL VISIT (OUTPATIENT)
Dept: ONCOLOGY | Facility: CLINIC | Age: 58
End: 2023-11-16
Attending: INTERNAL MEDICINE
Payer: MEDICARE

## 2023-11-16 VITALS — HEIGHT: 69 IN | WEIGHT: 174 LBS | BODY MASS INDEX: 25.77 KG/M2

## 2023-11-16 DIAGNOSIS — C16.3 MALIGNANT NEOPLASM OF PYLORIC ANTRUM (H): ICD-10-CM

## 2023-11-16 DIAGNOSIS — Z85.028 HISTORY OF GASTRIC CANCER: Primary | ICD-10-CM

## 2023-11-16 PROCEDURE — 99213 OFFICE O/P EST LOW 20 MIN: CPT | Mod: 95 | Performed by: INTERNAL MEDICINE

## 2023-11-16 ASSESSMENT — PAIN SCALES - GENERAL: PAINLEVEL: MILD PAIN (3)

## 2023-11-16 NOTE — LETTER
11/16/2023         RE: Nathalie Bashir  [address] [facility] [address]        Dear Colleague,    Thank you for referring your patient, Nathalie Bashir, to the [facility]. Please see a copy of my visit note below.    Virtual Visit Details    Type of service:  Video Visit   Video Start Time: 10:20 AM  Video End Time:10:27 AM    Originating Location (pt. Location): Home    Distant Location (provider location):  On-site  Platform used for Video Visit: [facility]    Oncology/Hematology Visit Note  Nov 16, 2023    Reason for Visit: Follow up of gastric adenocarcinoma.    History of Present Illness:     Please see previous notes for detail. I have copied and updated from prior notes.    Ms. Bashir is a 58 year old female with a history of gastric adenocarcinoma.  She was being followed for a pancreatic duct dilatation and pancreatic cyst which was noted in November 2018.  Since May 2019 she started noticing some nausea and vomiting and occasional abdominal discomfort.  She had an ultrasound in August 2019 which was essentially unremarkable.  She had a repeat endoscopic ultrasound on 10/29/2019 which showed increase in size of the cyst as well as dilation of the main pancreatic duct.  FNA and fluid analysis showed mucinous epithelium.  She was referred to Dr. 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  Surprisingly there was no pancreatic ductal neoplasm seen but on the resected specimen stomach cancer was noted.  It was poorly differentiated adenocarcinoma with poorly cohesive/signet ring cell type with proximal gastric mucosal and serosal margins being positive.  There was lymphovascular and perineural invasion seen.  22 lymph nodes were sampled and all were benign.  It was a pT4aN0 lesion.  HER-2/christine FISH was not amplified.         EUS on 3/3/2020 without clear evidence of neoplasm endoscopically. Left gastric lymph node fine-needle aspiration was  negative for carcinoma. Biopsy from the gastric jejunal anastomosis as well as lesser curvature of the stomach also did not show any malignancy.     She had a PET/CT on 3/13/2020 which showed soft tissue streaky density with increased FDG uptake adjacent to the pancreaticojejunostomy which could be neoplastic in origin.  There is mild increased FDG uptake in the gastric remnant.  Focal area of soft tissue thickening with fatty streakiness along medial laparotomy with increased FDG uptake which likely is inflammatory. Increased FDG uptake in thoracic esophagus which could be esophagitis.      She started on chemotherapy with 5FU, oxaliplatin, and Taxotere on 3/27/20. She was seen on 4/7/20 for evaluation of fevers. She was treated for possible pneumonia with Levaquin.      She was seen in clinic 4/13/20 for RUQ pain,CT and labs reassuring. She received cycles 2 and 3 of chemotherapy on 4/20/20 and 5/4/20. She was admitted from 5/21-5/23/20 with neutropenic aspiration pneumonia. She received cycle 4 on 5/27/20 with Neulasta support. PET/CT on 6/3/20 showed mild residual uptake at the site of gastrojejunal anastomosis which likely is physiologic.  There is almost resolution of soft tissue nodule next to the pancreaticoduodenal anastomosis without FDG uptake.  There is focal increased uptake in the left posterior acetabulum which could be artifact.     Decreased dose of oxaliplatin due to neuropathy/cold sensitivity on 6/11. Decreased 5FU to 85% with cycle #6 due to mucositis/diarrhea/taste changes. Treatment was held 7/9/20 due to diarrhea, dehydration, tachycardia, rash, and weight loss and she got cycle 7 on 7/15. On 7/20 she presented to the ED with RUQ abdominal pain, and was admitted 7/20-7/29 for acute cholangitis     She received C#8 FLOT on 8/14/2020.    PET/CT on 9/2/2020 did not show evidence of disease.  Small peritoneal nodule was a stable.  This was most consistent with fat necrosis.    CT chest abdomen  and pelvis on 9/23/2020 also does not show evidence of malignancy recurrence and the peritoneal nodule in the right upper quadrant is stable.  Before definite surgery she had diagnostic laparoscopy which was negative for evidence of malignancy so on 10/21/2020 she had exploratory laparotomy with extensive lysis of additions, partial omentectomy, resection of Gastrojejunal Anastomosis w/ En Bloc Subtotal Gastrectomy and modified D2 Lymphadenectomy with David-en-Y Gastrojejunostomy Reconstruction.  Final pathology showed Focal residual poorly differentiated adenocarcinoma (0.4cm) with signet-ring morphology status    post-neoadjuvant chemotherapy and all 9 lymph nodes were benign.  All margins were negative.  It was a pT1bN0 lesion.    Before definite surgery she had diagnostic laparoscopy which was negative for evidence of malignancy so on 10/21/2020 she had exploratory laparotomy with extensive lysis of additions, partial omentectomy, resection of Gastrojejunal Anastomosis w/ En Bloc Subtotal Gastrectomy and modified D2 Lymphadenectomy with David-en-Y Gastrojejunostomy Reconstruction.  Final pathology showed Focal residual poorly differentiated adenocarcinoma (0.4cm) with signet-ring morphology status    post-neoadjuvant chemotherapy and all 9 lymph nodes were benign.  All margins were negative.  It was a [id number] lesion.    She was admitted from 11/4/2020-11/6/2020 for nausea vomiting and left upper quadrant pain and CT scan showed fluid collection in the left upper quadrant, likely hematoma.  Upper GI was negative for leak.  No drainage was recommended by IR.  He was discharged on 7 days of levofloxacin and Flagyl.      11/20/2020.  CT abdomen and pelvis showed postsurgical changes of Whipple and subtotal gastrectomy with David-en-Y reconstruction.  There is resolution of previously demonstrated fluid collection between the stomach and the spleen and decrease in sigmoid thickening.  Small left pleural effusion with  atelectasis adjacent to it.    12/7/2020.  EGD showed healthy-appearing and generous pouch (remaining the stomach) with widely patent end to side gastrojejunostomy, though with the David/enteral limb mildly stenostic and tortuous at its origin. Therefore a covered metal stent was placed from stomach to jejunum across the stenotic region, resulting in straightening of the course.    2/8/2021.  EGD was done and stent was removed.    5/21/2021.  CT chest abdomen and pelvis does not show evidence of recurrence of malignancy.  11/15/2021-CT chest abdomen and pelvis without evidence of recurrence.  5/17/2022.  CT chest abdomen and pelvis without evidence of recurrence.    She was admitted to the hospital from 8/22/2020 - 8/26/2022 for right-sided pyelonephritis and E. coli bacteremia.  I reviewed the discharge summary.    She was started on meropenem and then ceftriaxone and discharged on levofloxacin.    In September 2022, she had a trauma and fractured her left wrist which required ORIF.      Interval History:  This is a video visit.    She feels well overall.  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  She was on duloxetine but now switched to Celexa.  She thinks neuropathy is overall about the same as before.  Denies any nausea vomiting diarrhea constipation.  Has occasional discomfort in the abdomen.     ECOG 1    ROS:  Otherwise a comprehensive review of the system was unremarkable.    I reviewed other history in epic as below.      Current Outpatient Medications   Medication Sig Dispense Refill    amitriptyline (ELAVIL) 75 MG tablet Take 1 tablet (75 mg) by mouth At Bedtime 30 tablet 3    citalopram (CELEXA) 20 MG tablet Take 1.5 tablets (30 mg) by mouth daily 135 tablet 1    COMPOUNDED NON-CONTROLLED SUBSTANCE (CMPD RX) - PHARMACY TO MIX COMPOUNDED MEDICATION Estriol 1 mg/gram,place 1 gram vaginally three times per week,42.5 grams 1 each 3    Cyanocobalamin 1000 MCG CAPS  "Take 1 tablet by mouth every morning      cyclobenzaprine (FLEXERIL) 10 MG tablet TAKE 1 TABLET BY MOUTH THREE TIMES A DAY AS NEEDED FOR MUSCLE SPASMS 90 tablet 3    famotidine (PEPCID) 20 MG tablet TAKE 1 TABLET (20 MG) BY MOUTH 2 TIMES DAILY AS NEEDED 180 tablet 1    fluticasone (FLONASE) 50 MCG/ACT nasal spray Spray 1 spray into both nostrils daily at 2 pm      hydrOXYzine (VISTARIL) 25 MG capsule Take 25-50 mg by mouth 4 times daily as needed for itching or anxiety      loperamide (IMODIUM) 2 MG capsule Take 4 mg by mouth 4 times daily as needed for diarrhea (Patient not taking: Reported on 10/17/2023)      loratadine-pseudoePHEDrine (CLARITIN-D 24-HOUR)  MG 24 hr tablet Take 1 tablet by mouth daily as needed for allergies Usually takes in Spring/Fall (Patient not taking: Reported on 10/17/2023)      ondansetron (ZOFRAN) 8 MG tablet Take 1 tablet (8 mg) by mouth every 8 hours as needed (Nausea/Vomiting) 90 tablet 2    oxyCODONE (ROXICODONE) 5 MG tablet Take 1 tablet (5 mg) by mouth 3 times daily as needed for breakthrough pain or moderate to severe pain Maximum 3 pills/day. 90 tablet 0    pantoprazole (PROTONIX) 40 MG EC tablet TAKE 1 TABLET BY MOUTH TWICE A  tablet 1    sennosides (SENOKOT) 8.6 MG tablet 1-2 tablets 1-2 times daily as needed. 120 tablet 1    simethicone 80 MG TABS Take 1 tablet by mouth every 6 hours as needed (bloating, gas, belching) 90 tablet 3    SUMAtriptan (IMITREX) 100 MG tablet Take 100 mg by mouth at onset of headache for migraine         Physical Examination:    Ht 1.74 m (5' 8.5\")   Wt 78.9 kg (174 lb)   LMP 09/23/2014   BMI 26.07 kg/m    Wt Readings from Last 10 Encounters:   11/16/23 78.9 kg (174 lb)   11/13/23 79.2 kg (174 lb 11.2 oz)   11/08/23 78 kg (172 lb)   11/03/23 80.6 kg (177 lb 11.2 oz)   10/19/23 80.3 kg (177 lb)   10/17/23 80.3 kg (177 lb)   09/27/23 80.3 kg (177 lb)   09/05/23 80.6 kg (177 lb 12.8 oz)   08/30/23 80.3 kg (177 lb)   08/29/23 80.7 kg (178 " lb)           Constitutional.  Does not seem to be in any acute distress.  Eyes.  No redness or discharge noted.  Respiratory.  Speaking in full sentences.  Breathing seems comfortable without any accessory use of muscles.    Skin.  Visualized his skin does not show any obvious rashes.  Musculoskeletal.  Range of motion for visualized areas is intact.  Neurological.  Alert and oriented x3.  Psychiatric.  Mood, mentation and affect are normal.  Decision making capacity is intact.      The rest of a comprehensive physical examination is deferred due to Public Health Emergency video visit restrictions.      Laboratory Data:     Reviewed  11/13/2023  CBC unremarkable   CMP unremarkable       CT chest abdomen and pelvis on 11/13/2023     IMPRESSION:   No evidence of metastatic disease in the chest, abdomen, or pelvis.      2/8/2021.  EGD showed David en Y anatomy with generous pouch and stent bridging the GJ and proximal David   The stent was removed and a relook endoscopies showed widely patent prioximal David and associated superficial ulceration          Assessment and Plan:    Gastric adenocarcinona, HER-2 negative.   - Started on neoadjuvant FLOT on 3/7/2020. She has required a dose reduction of her oxaliplatin by 30% due to cold sensitivity/neuropathy with cycle #5. 5FU was decreased to 85% with cycle #6 due to mucositis/diarrhea/taste changes.   Received C#8 on 8/14/2020.  PET/CT on 9/2/2020 did not show evidence of disease.  Small peritoneal nodule was stable.  This was most consistent with fat necrosis.    CT chest abdomen and pelvis on 9/23/2020 also does not show evidence of malignancy recurrence and the peritoneal nodule in the right upper quadrant is stable.    Before definite surgery she had diagnostic laparoscopy which was negative for evidence of malignancy so on 10/21/2020 she had exploratory laparotomy with extensive lysis of additions, partial omentectomy, resection of Gastrojejunal Anastomosis w/ En Bloc  Subtotal Gastrectomy and modified D2 Lymphadenectomy with David-en-Y Gastrojejunostomy Reconstruction.  Final pathology showed Focal residual poorly differentiated adenocarcinoma (0.4cm) with signet-ring morphology status    post-neoadjuvant chemotherapy and all 9 lymph nodes were benign.  All margins were negative.  It was a [id number] lesion.    We decided to keep her on observation as she had received 6 months of neoadjuvant chemotherapy and then the definite surgery.     She feels well.  Now that she is more than 3 years out from the surgery and without evidence of recurrence, I will plan to repeat CT chest abdomen and pelvis once a year for the next 2 years.        Neuropathy.  She has chemotherapy related neuropathy.  In the past she tried acupuncture but it upset her nerve so she stopped it. She was intolerant to gabapentin and pregabalin and did not like duloxetine.  Currently on Celexa and oxycodone.  I also reviewed notes from Dr. Óscar Cook from palliative care.      Port-A-Cath.  She wants to get the port removed and we will arrange for that.      We did not address the following today.    Anemia.  Now resolved.  Previously she had iron deficiency anemia and she received INFeD on 10/16/2020.  Anemia resolved but she became anemic again.  Iron studies were unremarkable in May 2022.  B12 and folate were normal.  Erythropoietin was inappropriately low.      Pyelonephritis.  E. coli bacteremia.  Repeat blood cultures were negative.  Now off antibiotics.  Initially there was a consideration to remove the Port-A-Cath but she improved and blood cultures became negative so port is still there.        Abdominal pain.  This improved after EGD in December 2020 with a covered metal stent was placed from stomach to jejunum across the stenotic region of David/enteral limb.  The stent was removed in February 2021.       Return to clinic in 1 year with labs/CT scan prior.    All questions answered and she is agreeable  with the plan.    Magaly Pollard MD

## 2023-11-16 NOTE — NURSING NOTE
Patient confirms medications and allergies are accurate via patients echeck in completion, and or denies any changes since last reviewed/verified.     Is the patient currently in the state of [place]? YES    Visit mode:VIDEO    If the visit is dropped, the patient can be reconnected by: VIDEO VISIT: Text to cell phone:   Telephone Information:   Mobile [phone]       Will anyone else be joining the visit? NO  (If patient encounters technical issues they should call [phone]    How would you like to obtain your AVS? MyChart    Are changes needed to the allergy or medication list? No    Reason for visit: RECHECK    Omaira CHIN

## 2023-11-16 NOTE — PROGRESS NOTES
Virtual Visit Details    Type of service:  Video Visit   Video Start Time: 10:20 AM  Video End Time:10:27 AM    Originating Location (pt. Location): Home    Distant Location (provider location):  On-site  Platform used for Video Visit: [facility]

## 2023-11-16 NOTE — PROGRESS NOTES
Oncology/Hematology Visit Note  Nov 16, 2023    Reason for Visit: Follow up of gastric adenocarcinoma.    History of Present Illness:     Please see previous notes for detail. I have copied and updated from prior notes.    Ms. Bashir is a 58 year old female with a history of gastric adenocarcinoma.  She was being followed for a pancreatic duct dilatation and pancreatic cyst which was noted in November 2018.  Since May 2019 she started noticing some nausea and vomiting and occasional abdominal discomfort.  She had an ultrasound in August 2019 which was essentially unremarkable.  She had a repeat endoscopic ultrasound on 10/29/2019 which showed increase in size of the cyst as well as dilation of the main pancreatic duct.  FNA and fluid analysis showed mucinous epithelium.  She was referred to Dr. 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  Surprisingly there was no pancreatic ductal neoplasm seen but on the resected specimen stomach cancer was noted.  It was poorly differentiated adenocarcinoma with poorly cohesive/signet ring cell type with proximal gastric mucosal and serosal margins being positive.  There was lymphovascular and perineural invasion seen.  22 lymph nodes were sampled and all were benign.  It was a pT4aN0 lesion.  HER-2/christine FISH was not amplified.         EUS on 3/3/2020 without clear evidence of neoplasm endoscopically. Left gastric lymph node fine-needle aspiration was negative for carcinoma. Biopsy from the gastric jejunal anastomosis as well as lesser curvature of the stomach also did not show any malignancy.     She had a PET/CT on 3/13/2020 which showed soft tissue streaky density with increased FDG uptake adjacent to the pancreaticojejunostomy which could be neoplastic in origin.  There is mild increased FDG uptake in the gastric remnant.  Focal area of soft tissue thickening with fatty streakiness along medial laparotomy with increased FDG uptake which  likely is inflammatory. Increased FDG uptake in thoracic esophagus which could be esophagitis.      She started on chemotherapy with 5FU, oxaliplatin, and Taxotere on 3/27/20. She was seen on 4/7/20 for evaluation of fevers. She was treated for possible pneumonia with Levaquin.      She was seen in clinic 4/13/20 for RUQ pain,CT and labs reassuring. She received cycles 2 and 3 of chemotherapy on 4/20/20 and 5/4/20. She was admitted from 5/21-5/23/20 with neutropenic aspiration pneumonia. She received cycle 4 on 5/27/20 with Neulasta support. PET/CT on 6/3/20 showed mild residual uptake at the site of gastrojejunal anastomosis which likely is physiologic.  There is almost resolution of soft tissue nodule next to the pancreaticoduodenal anastomosis without FDG uptake.  There is focal increased uptake in the left posterior acetabulum which could be artifact.     Decreased dose of oxaliplatin due to neuropathy/cold sensitivity on 6/11. Decreased 5FU to 85% with cycle #6 due to mucositis/diarrhea/taste changes. Treatment was held 7/9/20 due to diarrhea, dehydration, tachycardia, rash, and weight loss and she got cycle 7 on 7/15. On 7/20 she presented to the ED with RUQ abdominal pain, and was admitted 7/20-7/29 for acute cholangitis     She received C#8 FLOT on 8/14/2020.    PET/CT on 9/2/2020 did not show evidence of disease.  Small peritoneal nodule was a stable.  This was most consistent with fat necrosis.    CT chest abdomen and pelvis on 9/23/2020 also does not show evidence of malignancy recurrence and the peritoneal nodule in the right upper quadrant is stable.  Before definite surgery she had diagnostic laparoscopy which was negative for evidence of malignancy so on 10/21/2020 she had exploratory laparotomy with extensive lysis of additions, partial omentectomy, resection of Gastrojejunal Anastomosis w/ En Bloc Subtotal Gastrectomy and modified D2 Lymphadenectomy with David-en-Y Gastrojejunostomy Reconstruction.   Final pathology showed Focal residual poorly differentiated adenocarcinoma (0.4cm) with signet-ring morphology status    post-neoadjuvant chemotherapy and all 9 lymph nodes were benign.  All margins were negative.  It was a pT1bN0 lesion.    Before definite surgery she had diagnostic laparoscopy which was negative for evidence of malignancy so on 10/21/2020 she had exploratory laparotomy with extensive lysis of additions, partial omentectomy, resection of Gastrojejunal Anastomosis w/ En Bloc Subtotal Gastrectomy and modified D2 Lymphadenectomy with David-en-Y Gastrojejunostomy Reconstruction.  Final pathology showed Focal residual poorly differentiated adenocarcinoma (0.4cm) with signet-ring morphology status    post-neoadjuvant chemotherapy and all 9 lymph nodes were benign.  All margins were negative.  It was a ddM7hT8 lesion.    She was admitted from 11/4/2020-11/6/2020 for nausea vomiting and left upper quadrant pain and CT scan showed fluid collection in the left upper quadrant, likely hematoma.  Upper GI was negative for leak.  No drainage was recommended by IR.  He was discharged on 7 days of levofloxacin and Flagyl.      11/20/2020.  CT abdomen and pelvis showed postsurgical changes of Whipple and subtotal gastrectomy with David-en-Y reconstruction.  There is resolution of previously demonstrated fluid collection between the stomach and the spleen and decrease in sigmoid thickening.  Small left pleural effusion with atelectasis adjacent to it.    12/7/2020.  EGD showed healthy-appearing and generous pouch (remaining the stomach) with widely patent end to side gastrojejunostomy, though with the David/enteral limb mildly stenostic and tortuous at its origin. Therefore a covered metal stent was placed from stomach to jejunum across the stenotic region, resulting in straightening of the course.    2/8/2021.  EGD was done and stent was removed.    5/21/2021.  CT chest abdomen and pelvis does not show evidence of  recurrence of malignancy.  11/15/2021-CT chest abdomen and pelvis without evidence of recurrence.  5/17/2022.  CT chest abdomen and pelvis without evidence of recurrence.    She was admitted to the hospital from 8/22/2020 - 8/26/2022 for right-sided pyelonephritis and E. coli bacteremia.  I reviewed the discharge summary.    She was started on meropenem and then ceftriaxone and discharged on levofloxacin.    In September 2022, she had a trauma and fractured her left wrist which required ORIF.      Interval History:  This is a video visit.    She feels well overall.  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  She was on duloxetine but now switched to Celexa.  She thinks neuropathy is overall about the same as before.  Denies any nausea vomiting diarrhea constipation.  Has occasional discomfort in the abdomen.     ECOG 1    ROS:  Otherwise a comprehensive review of the system was unremarkable.    I reviewed other history in epic as below.      Current Outpatient Medications   Medication Sig Dispense Refill    amitriptyline (ELAVIL) 75 MG tablet Take 1 tablet (75 mg) by mouth At Bedtime 30 tablet 3    citalopram (CELEXA) 20 MG tablet Take 1.5 tablets (30 mg) by mouth daily 135 tablet 1    COMPOUNDED NON-CONTROLLED SUBSTANCE (CMPD RX) - PHARMACY TO MIX COMPOUNDED MEDICATION Estriol 1 mg/gram,place 1 gram vaginally three times per week,42.5 grams 1 each 3    Cyanocobalamin 1000 MCG CAPS Take 1 tablet by mouth every morning      cyclobenzaprine (FLEXERIL) 10 MG tablet TAKE 1 TABLET BY MOUTH THREE TIMES A DAY AS NEEDED FOR MUSCLE SPASMS 90 tablet 3    famotidine (PEPCID) 20 MG tablet TAKE 1 TABLET (20 MG) BY MOUTH 2 TIMES DAILY AS NEEDED 180 tablet 1    fluticasone (FLONASE) 50 MCG/ACT nasal spray Spray 1 spray into both nostrils daily at 2 pm      hydrOXYzine (VISTARIL) 25 MG capsule Take 25-50 mg by mouth 4 times daily as needed for itching or anxiety      loperamide (IMODIUM) 2  "MG capsule Take 4 mg by mouth 4 times daily as needed for diarrhea (Patient not taking: Reported on 10/17/2023)      loratadine-pseudoePHEDrine (CLARITIN-D 24-HOUR)  MG 24 hr tablet Take 1 tablet by mouth daily as needed for allergies Usually takes in Spring/Fall (Patient not taking: Reported on 10/17/2023)      ondansetron (ZOFRAN) 8 MG tablet Take 1 tablet (8 mg) by mouth every 8 hours as needed (Nausea/Vomiting) 90 tablet 2    oxyCODONE (ROXICODONE) 5 MG tablet Take 1 tablet (5 mg) by mouth 3 times daily as needed for breakthrough pain or moderate to severe pain Maximum 3 pills/day. 90 tablet 0    pantoprazole (PROTONIX) 40 MG EC tablet TAKE 1 TABLET BY MOUTH TWICE A  tablet 1    sennosides (SENOKOT) 8.6 MG tablet 1-2 tablets 1-2 times daily as needed. 120 tablet 1    simethicone 80 MG TABS Take 1 tablet by mouth every 6 hours as needed (bloating, gas, belching) 90 tablet 3    SUMAtriptan (IMITREX) 100 MG tablet Take 100 mg by mouth at onset of headache for migraine         Physical Examination:    Ht 1.74 m (5' 8.5\")   Wt 78.9 kg (174 lb)   LMP 09/23/2014   BMI 26.07 kg/m    Wt Readings from Last 10 Encounters:   11/16/23 78.9 kg (174 lb)   11/13/23 79.2 kg (174 lb 11.2 oz)   11/08/23 78 kg (172 lb)   11/03/23 80.6 kg (177 lb 11.2 oz)   10/19/23 80.3 kg (177 lb)   10/17/23 80.3 kg (177 lb)   09/27/23 80.3 kg (177 lb)   09/05/23 80.6 kg (177 lb 12.8 oz)   08/30/23 80.3 kg (177 lb)   08/29/23 80.7 kg (178 lb)           Constitutional.  Does not seem to be in any acute distress.  Eyes.  No redness or discharge noted.  Respiratory.  Speaking in full sentences.  Breathing seems comfortable without any accessory use of muscles.    Skin.  Visualized his skin does not show any obvious rashes.  Musculoskeletal.  Range of motion for visualized areas is intact.  Neurological.  Alert and oriented x3.  Psychiatric.  Mood, mentation and affect are normal.  Decision making capacity is intact.      The rest of a " comprehensive physical examination is deferred due to [facility] video visit restrictions.      Laboratory Data:     Reviewed  11/13/2023  CBC unremarkable   CMP unremarkable       CT chest abdomen and pelvis on 11/13/2023     IMPRESSION:   No evidence of metastatic disease in the chest, abdomen, or pelvis.      2/8/2021.  EGD showed David en Y anatomy with generous pouch and stent bridging the GJ and proximal David   The stent was removed and a relook endoscopies showed widely patent prioximal David and associated superficial ulceration          Assessment and Plan:    Gastric adenocarcinona, HER-2 negative.   - Started on neoadjuvant FLOT on 3/7/2020. She has required a dose reduction of her oxaliplatin by 30% due to cold sensitivity/neuropathy with cycle #5. 5FU was decreased to 85% with cycle #6 due to mucositis/diarrhea/taste changes.   Received C#8 on 8/14/2020.  PET/CT on 9/2/2020 did not show evidence of disease.  Small peritoneal nodule was stable.  This was most consistent with fat necrosis.    CT chest abdomen and pelvis on 9/23/2020 also does not show evidence of malignancy recurrence and the peritoneal nodule in the right upper quadrant is stable.    Before definite surgery she had diagnostic laparoscopy which was negative for evidence of malignancy so on 10/21/2020 she had exploratory laparotomy with extensive lysis of additions, partial omentectomy, resection of Gastrojejunal Anastomosis w/ En Bloc Subtotal Gastrectomy and modified D2 Lymphadenectomy with David-en-Y Gastrojejunostomy Reconstruction.  Final pathology showed Focal residual poorly differentiated adenocarcinoma (0.4cm) with signet-ring morphology status    post-neoadjuvant chemotherapy and all 9 lymph nodes were benign.  All margins were negative.  It was a [id number] lesion.    We decided to keep her on observation as she had received 6 months of neoadjuvant chemotherapy and then the definite surgery.     She feels well.  Now that  she is more than 3 years out from the surgery and without evidence of recurrence, I will plan to repeat CT chest abdomen and pelvis once a year for the next 2 years.        Neuropathy.  She has chemotherapy related neuropathy.  In the past she tried acupuncture but it upset her nerve so she stopped it. She was intolerant to gabapentin and pregabalin and did not like duloxetine.  Currently on Celexa and oxycodone.  I also reviewed notes from Dr. Óscar Cook from palliative care.      Port-A-Cath.  She wants to get the port removed and we will arrange for that.      We did not address the following today.    Anemia.  Now resolved.  Previously she had iron deficiency anemia and she received INFeD on 10/16/2020.  Anemia resolved but she became anemic again.  Iron studies were unremarkable in May 2022.  B12 and folate were normal.  Erythropoietin was inappropriately low.          Pyelonephritis.  E. coli bacteremia.  Repeat blood cultures were negative.  Now off antibiotics.  Initially there was a consideration to remove the Port-A-Cath but she improved and blood cultures became negative so port is still there.        Abdominal pain.  This improved after EGD in December 2020 with a covered metal stent was placed from stomach to jejunum across the stenotic region of David/enteral limb.  The stent was removed in February 2021.       Return to clinic in 1 year with labs/CT scan prior.    All questions answered and she is agreeable with the plan.    Magaly Pollard MD

## 2023-11-17 ENCOUNTER — OFFICE VISIT (OUTPATIENT)
Dept: PALLIATIVE MEDICINE | Facility: OTHER | Age: 58
End: 2023-11-17
Payer: MEDICARE

## 2023-11-17 VITALS
DIASTOLIC BLOOD PRESSURE: 66 MMHG | SYSTOLIC BLOOD PRESSURE: 100 MMHG | WEIGHT: 175 LBS | BODY MASS INDEX: 26.22 KG/M2 | OXYGEN SATURATION: 96 % | HEART RATE: 99 BPM

## 2023-11-17 DIAGNOSIS — G89.3 CHRONIC PAIN DUE TO NEOPLASM: ICD-10-CM

## 2023-11-17 DIAGNOSIS — M79.2 NEUROPATHIC PAIN: Primary | ICD-10-CM

## 2023-11-17 PROCEDURE — G0463 HOSPITAL OUTPT CLINIC VISIT: HCPCS | Performed by: NURSE PRACTITIONER

## 2023-11-17 PROCEDURE — 99214 OFFICE O/P EST MOD 30 MIN: CPT | Performed by: NURSE PRACTITIONER

## 2023-11-17 RX ORDER — AMITRIPTYLINE HYDROCHLORIDE 75 MG/1
75 TABLET ORAL AT BEDTIME
Qty: 30 TABLET | Refills: 3 | Status: SHIPPED | OUTPATIENT
Start: 2023-11-17 | End: 2023-12-05

## 2023-11-17 RX ORDER — AMITRIPTYLINE HYDROCHLORIDE 75 MG/1
75 TABLET ORAL AT BEDTIME
Qty: 30 TABLET | Refills: 3 | Status: SHIPPED | OUTPATIENT
Start: 2023-11-17 | End: 2023-11-17

## 2023-11-17 RX ORDER — OXYCODONE HYDROCHLORIDE 5 MG/1
5 TABLET ORAL 3 TIMES DAILY PRN
Qty: 90 TABLET | Refills: 0 | Status: SHIPPED | OUTPATIENT
Start: 2023-11-17 | End: 2023-12-07

## 2023-11-17 ASSESSMENT — PAIN SCALES - GENERAL: PAINLEVEL: SEVERE PAIN (6)

## 2023-11-17 NOTE — PATIENT INSTRUCTIONS
[facility] Number:  [phone]  Call with any questions about your care and for scheduling assistance.   Calls are returned Monday through Friday between 8 AM and 4:30 PM. We usually get back to you within 2 business days depending on the issue/request.    If we are prescribing your medications:  For opioid medication refills, call the clinic or send a [facility] message 7 days in advance.  Please include:  Name of requested medication  Name of the pharmacy.  For non-opioid medications, call your pharmacy directly to request a refill. Please allow 3-4 days to be processed.   Per [place] State Law:  All controlled substance prescriptions must be filled within 30 days of being written.    For those controlled substances allowing refills, pickup must occur within 30 days of last fill.      We believe regular attendance is key to your success in our program!    Any time you are unable to keep your appointment we ask that you call us at least 24 hours in advance to cancel.This will allow us to offer the appointment time to another patient.   Multiple missed appointments may lead to dismissal from the clinic.       Plan   Follow-up 1 month in clinic.    Refill amitriptyline and oxycodone.

## 2023-11-17 NOTE — PROGRESS NOTES
Date:11/17/2023      COMPREHENSIVE PAIN CLINIC FOLLOW UP EVALUATION    Ms. Nathalie Bashir on 11/3/2023 in the [facility] per request of Dr. Dobbs with regards to her pain.  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  UDS and opioid agreement singed and UDS appropriate on 11/03/2023.  Nathalie Bashir has been seen at a pain clinic in the past in [place] 10 years ago, [facility] in 1990s and [facility].      Updates since initial consult on 11/03/2023.  Dr. Urbina - 1st lumbar MBB only gave  50% - 60% relief and will not proceed with 2nd diagnostic block.  No further follow up is scheduled.    She has MRI R) shoulder tomorrow.    She will start PT on Monday through [facility].    She needs refills of oxycodone.  Last tab was taken this am.  She also needs refills of amitriptyline.    She has been busy with her Grandchildren.       Interventions/Injections: none      Progress Notes Reviewed:  11/16/2023 Dr Magaly Pollard, Hematology & Oncology - no new issues, will discontinue port and followup in 1 year.  11/08/2023 Dr. 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  Stomach cancer was treated with gastrectomy and chemotherapy.  Doctors found stomach cancer when they did Whipple Surgery 01/2020 at [facility].   Patient has not had any spine surgery in the past.   Patient has numbness and tingling in legs in the stocking foot distribution and b/l hands.    She also has chronic low back pain. First lumbar MBB was not  effective enough to warrant 2nd lumbar MBB.    Rheumatology could not find any cause for her joint pain.      Characteristics:  No changes in pain characteristics since last appointment.  The patient describes the pain as constant, stabbing, burning and numbness.   Patient has numbness and tingling in legs in the stocking foot distribution and b/l hands.      What makes the pain better:  Her pain is improved with laying down, rest.    What makes the pain worse:   She reports that the pain is made worse by prolong standing, ADL's.  11/17/2023 current pain on 0/10 VAS:   6   Worst pain:   9    Best pain:  3  11/03/2023 current pain score at 7/10, but it can be as low as 3/10 or as severe as 9/10.      Current Pain Related Medications:  Any medications changes since last appointment:    Amitriptyline 75mg q hs - she ran out of this medication for sleep.  Not helpful for neuropathic pain.  Celexa 20mg 1.5 tabs daily 30mg daily  Compounding medication  Cyclobenzaprine 10 mg TID PRN  Hydroxyzine 25mg 1-2 tabs QID anxiety  Zofran 8mg prn nausea with migraines  Oxycodone 5mg TID - last dose this am.  Since 2020 with palliative care  Stool softeners PRN  Imitrex 100mg PRN HA - per PCP    NOTE:  allergies to naprosyn, methadone, latex, ketorlac, gabapentin, acetaminophen, topiramate, pregabalin and oxycontin        Therapies discuss on initial consult:   Physical therapy, Pain Psychology, TENs unit, Grounding Mat, Frequency Specific Micro Current, Anti-inflammatory Lifestyle    Social:    She is  and  lives in a house.  She has 1 grown son and 4 grandchildren.  She is independent in ADL's.      Employment: She is on SSD.    Exercise:  Last PT was 2018.  Patient exercises with stretching at home. Starting PT on Monday.    Hobbies:  She enjoys her grandchildren.    Mental Health:  Patient reports depression is under control on citalopram 30mg.  Patient does not follow with a mental health care provider.                   Plan on initial consult 11/03/2023:  A multimodal plan was developed today to treat your pain.  Multimodal analgesia is a strategy that reduces reliance on opioids through the use of non-opioid analgesics and therapies that have different mechanisms of action.    Will obtain UDS and opioid agreement today.      Diagnostics: lumbar MRI, XRAY R) shoulder, XRAY sacrum/hip were reviewed today.        Medications:  No changes to medications today.    Amitriptyline 75mg q hs - she ran out of this medication for sleep.  Not helpful for neuropathic pain.  Celexa 20mg 1.5 tabs daily 30mg daily  Compounding medication  Cyclobenzaprine 10 mg TID PRN  Hydroxyzine 25mg 1-2 tabs QID anxiety  Zofran 8mg prn nausea with migraines  Oxycodone 5mg TID - since 2020 with palliative care  Stool softeners PRN  Imitrex 100mg PRN HA      The following OTC pain medications may be helpful, use as directed: Voltaren Gel 1%, CBD products, Arnica products, Capsaicin products, Australian Dream Cream, Epson It, Arnica products, Lidocaine Patch, Solanpas, Biofreeze, Aspercream, Tiger Balm and Alejandro Emu cream.  Apply heat or cold PRN.      Therapies:  Discussed anti-inflammatory lifestyle.    Discussed Pain Psychology - consider in the future  Psychological treatments are also important part of pain management.  Understanding and managing the thoughts, emotions and behaviors that accompany the discomfort can help you cope more effectively with your pain and can actually reduce the intensity of your pain.      PHYSICAL THERAPY - referral place today for R) shoulder and lumbar spine  Discussed the importance of core strengthening, ROM, stretching exercises with the patient and how each of these entities is important in decreasing pain.  Explained to the patient that the purpose of physical therapy is to teach the patient a home exercise program.  These exercises need to be performed every day in order to decrease pain and prevent future  occurrences of pain.        Discussed Grounding Mat - handout provided  https://www.Wayna.com/watch?v=[email or web address]    Discussed Frequency Specific Microcurrent - handout provided  Treatment for Neuropathic Pain.   [facility] [phone]  [facility] [phone]  May be able to be billed as a chiropractic service depending on your insurance coverage.     Discussed Acupuncture.    Discussed [facility] TENs unit.    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  Stomach cancer was treated with gastrectomy and chemotherapy.  Doctors found stomach cancer when they did Whipple Surgery 01/2020 at [facility].  Patient has numbness and tingling in legs in the stocking foot distribution and b/l hands.  Patient has lumbar pain and has had lumbar MBB x 1 with Dr. Urbina.  Patient has not had any spine surgery in the past.  The patient describes the pain as constant, stabbing, burning and numbness.  She reports that the pain is made worse by prolong standing, ADL's.  Her pain is improved with laying down, rest.  She rates her currenty pain score at 7/10, but it can be as low as 3/10 or as severe as 9/10. Patient follows with Dr. Urbina for lumbar intervention options.  Rheumatology could not find any cause for her joint pain.      Progress Notes Reviewed:  10/19/2023 Dr. Dobbs, PM&R - order quad cane and compression stockings, follow-up with Dr. Urbina for low back pain  10/17/2023 Dr. Mckoy, Rheumatology  10/01/2023 UC - UTI  09/27/2023 Dr. Urbina - lumbar MBB L4-5 and L5-S1  09/12/2023 Dr. Urbina    Patient reports depression is under control on citalopram 30mg.  Patient does not follow with a  mental health care provider.  Last PT was 2018.  Patient exercises with stretching at home.    She denies any new problems with falls or balance, any new numbness or weakness of the arms or legs, any new bowel or bladder incontinence, any night sweats or unexplained fevers, or any sudden or unexpected weight loss.      Nathalie Bashir has been seen at a pain clinic in the past in [place] 10 years ago, [facility] in 1990s and  [facility].      Current Treatments:  Amitriptyline 75mg q hs - she ran out of this medication for sleep.  Not helpful for neuropathic pain.  Celexa 20mg 1.5 tabs daily 30mg daily  Compounding medication  Cyclobenzaprine 10 mg TID PRN  Hydroxyzine 25mg 1-2 tabs QID anxiety  Zofran 8mg prn nausea with migraines  Oxycodone 5mg TID - since 2020 with palliative care  Stool softeners PRN  Imitrex 100mg PRN HA    NOTE:  allergies to naprosyn, methadone, latex, ketorlac, gabapentin, acetaminophen, topiramate, pregabalin and oxycontin    Previous Medication Treatments Included:  Anti-convulsants: Gabapentin, pregabalin had negative SE  Muscle relaxors: Tizanidine not helpful  Anti-depressants: amitriptyline was not helpful for pain, duloxetine had negative SE with mood  Benzodiazapine's: none  Acetaminophen/NSAIDs: negative SE  Topicals: Voltaren gel 1%  Headache abortives: imitrex  Headache prophylactics:   Opioids: MS ER 30mg BID, Oxycodone 10mg BID at previous pain - [facility] in 1990, buprenorphine caused negative SE      Other Treatments Have Included:  Physical therapy: yes over 2 years ago  Pain Psychology: no  Chiropractic: yes - not helpful  Acupuncture: yes - not helpful  TENs Unit: yes she has one  Injections: 09/27/2023 lumbar MBB #1  Surgeries: L) hip replacement 2015, L) SI fusion 2008  Dry Needling: no  Massage:no    Past Medical History:  Medical history reviewed.  Past Medical History:   Diagnosis Date    Allergic rhinitis     Cancer (H)     stomach cancer    Chronic pain     Closed  fracture of distal end of left radius     Depression     Gastric adenocarcinoma (H)     Lumbago     Migraine     Opioid dependence (H)     Other chronic pain     low back    Sacroiliitis (H24)     low back pain    Trochanteric bursitis     leg length discrrepancy, hip pain      Patient Active Problem List   Diagnosis    Lower limb length difference    IPMN (intraductal papillary mucinous neoplasm)    Allergic rhinitis    Arthralgia of hip    Bursitis    Chronic neck pain    Chronic pain disorder    Continuous opioid dependence (H)    Controlled substance agreement signed    Debility    Degeneration of intervertebral disc of lumbosacral region    Depression    Disorder of sacrum    Greater trochanteric bursitis of left hip    Infected prosthesis of left hip (H24)    Chronic bilateral low back pain without sciatica    MDD (major depressive disorder), recurrent, in partial remission (H24)    Migraine with aura    Sacroiliitis (H24)    Screening for malignant neoplasm of cervix    Spondylosis of lumbosacral spine without myelopathy    Status post left hip replacement    Cervical radiculopathy, chronic    Unilateral inguinal hernia without obstruction or gangrene    Long term (current) use of opiate analgesic    Low urine output    Hypophosphatemia    Leukocytosis    Intra-abdominal fluid collection    Bacteremia due to Gram-negative bacteria    Malnutrition (H24)    Gastric adenocarcinoma (H)    Pneumonia    Mucositis    Chemotherapy-induced neutropenia     Gastric cancer (H)    Cholangitis (H28)    Thrombocytopenia (H24)    Urinary tract infection    Crohn's disease of small intestine (H)    Jejunitis    Colitis    Abdominal pain    Malignant neoplasm of overlapping sites of stomach (H)    Iron deficiency anemia    Anemia    Paralytic ileus (H)    Hypokalemia    Nausea & vomiting    Dysphagia    History of gastric surgery    Chemotherapy-induced neuropathy     Bilateral hand pain    Pyelonephritis    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  Past Surgical History:   Procedure Laterality Date    ARTHROPLASTY HIP Left 2016    BACK SURGERY      L4-5 decompression    CATARACT IOL, RT/LT      CHOLECYSTECTOMY N/A 1/28/2020    Procedure: Cholecystectomy;  Surgeon: Yandel Mallory MD;  Location: [facility] OR    ENDOSCOPIC RETROGRADE CHOLANGIOPANCREATOGRAM N/A 7/27/2020    Procedure: ENDOSCOPIC RETROGRADE CHOLANGIOPANCREATOGRAPHY  **Latex Allergy** with jejunal biopsies;  Surgeon: Jeet Aviles MD;  Location:  OR    ESOPHAGOSCOPY, GASTROSCOPY, DUODENOSCOPY (EGD), COMBINED N/A 3/3/2020    Procedure: ESOPHAGOGASTRODUODENOSCOPY, WITH ENDOSCOPIC US (enoxaparin);  Surgeon: Bakari Plata MD;  Location:  GI    ESOPHAGOSCOPY, GASTROSCOPY, DUODENOSCOPY (EGD), COMBINED N/A 12/7/2020    Procedure: Upper Endoscopy with stent placement;  Surgeon: Jeet Aviles MD;  Location:  OR    ESOPHAGOSCOPY, GASTROSCOPY, DUODENOSCOPY (EGD), COMBINED N/A 2/8/2021    Procedure: ESOPHAGOGASTRODUODENOSCOPY (EGD)AND STENT REMOVAL;  Surgeon: Jeet Aviles MD;  Location:  OR    EYE SURGERY      GASTRECTOMY N/A 10/21/2020    Procedure: Open subtotal gastectomy with David en Y Reconstruction; D1 Lymph Node Disection  **Latex Allergy**;  Surgeon: Yandel Mallory MD;  Location: [facility] OR    INJECT BLOCK MEDIAL BRANCH CERVICAL/THORACIC/LUMBAR Bilateral 9/27/2023    Procedure: BILATERAL L4-5, L5-S1 medial branch block #1;  Surgeon: Maria Victoria Urbina MD;  Location: [facility] OR    IR CHEST PORT PLACEMENT > 5 YRS OF AGE  3/26/2020    IR LUMBAR EPIDURAL STEROID INJECTION  8/25/2009    IR LUMBAR EPIDURAL STEROID INJECTION  9/2/2009    IR LUMBAR EPIDURAL STEROID INJECTION  9/25/2009    IR LUMBAR  EPIDURAL STEROID INJECTION  11/23/2009    IR LUMBAR EPIDURAL STEROID INJECTION  4/1/2010    IR LUMBAR EPIDURAL STEROID INJECTION  9/1/2010    IR LUMBAR EPIDURAL STEROID INJECTION  3/10/2011    IR LUMBAR EPIDURAL STEROID INJECTION  8/30/2011    LAPAROSCOPY DIAGNOSTIC (GENERAL) N/A 10/13/2020    Procedure: Diagnostic laparoscopy with peritoneal washings  **Latex Allergy**;  Surgeon: Yadnel Mallory MD;  Location: [facility] OR    OPEN REDUCTION INTERNAL FIXATION RODDING INTRAMEDULLARY FEMUR Left 12/23/2014    Procedure: OPEN REDUCTION INTERNAL FIXATION RODDING INTRAMEDULLARY FEMUR;  Surgeon: Arnol Santiago MD;  Location: [facility] OR    OPEN REDUCTION INTERNAL FIXATION WRIST Left 9/22/2022    Procedure: LEFT WRIST OPEN REDUCTION INTERNAL FIXATION, FRACTURE;  Surgeon: Mark Villalobos MD;  Location: [facility] OR    ORTHOPEDIC SURGERY      PICC DOUBLE LUMEN PLACEMENT Right 02/07/2020    5 fr double lumen 41 cm    ND LAMINEC/FACETECT/FORAMIN,LUMBAR 1 SEG      Description: Laminectomy Decompress, Facetectomy, Foraminotomy Lumbar Seg;  Recorded: 04/12/2012;    REMOVE HARDWARE LOWER EXTREMITY Left 4/7/2015    Procedure: REMOVE HARDWARE LOWER EXTREMITY;  Surgeon: Arnol Santiago MD;  [facility] OR    REMOVE HARDWARE RODDING INTRAMEDULLARY FEMUR Left 12/17/2015    Procedure: REMOVE HARDWARE RODDING INTRAMEDULLARY FEMUR;  Surgeon: Arnol Santiago MD;  [facility] OR    RESECT BONE LOWER EXTREMITY Left 12/23/2014    Procedure: RESECT BONE LOWER EXTREMITY;  Surgeon: Arnol Santiago MD;  [facility] OR    SHORTENING OSTEOPLASTY FEMUR W/ IM NAILING      WHIPPLE PROCEDURE N/A 1/28/2020    Procedure: Open Whipple procedure and Cholecystectomy;  Surgeon: Yandel Mallory MD;  Location: [facility] OR    WHIPPLE PROCEDURE      ZZC FUSION OF SACROILIAC JOINT      Description: Arthrodesis Sacroiliac Joint Left;  Recorded: 10/07/2011;    ZZC REPAIR DETACH RETINA,SCLERAL BUCKLE          Medications: Pertinent medications reviewed.  Current Outpatient  Medications   Medication Sig Dispense Refill    amitriptyline (ELAVIL) 75 MG tablet Take 1 tablet (75 mg) by mouth At Bedtime 30 tablet 3    citalopram (CELEXA) 20 MG tablet Take 1.5 tablets (30 mg) by mouth daily 135 tablet 1    COMPOUNDED NON-CONTROLLED SUBSTANCE (CMPD RX) - PHARMACY TO MIX COMPOUNDED MEDICATION Estriol 1 mg/gram,place 1 gram vaginally three times per week,42.5 grams 1 each 3    Cyanocobalamin 1000 MCG CAPS Take 1 tablet by mouth every morning      cyclobenzaprine (FLEXERIL) 10 MG tablet TAKE 1 TABLET BY MOUTH THREE TIMES A DAY AS NEEDED FOR MUSCLE SPASMS 90 tablet 3    famotidine (PEPCID) 20 MG tablet TAKE 1 TABLET (20 MG) BY MOUTH 2 TIMES DAILY AS NEEDED 180 tablet 1    fluticasone (FLONASE) 50 MCG/ACT nasal spray Spray 1 spray into both nostrils daily at 2 pm      hydrOXYzine (VISTARIL) 25 MG capsule Take 25-50 mg by mouth 4 times daily as needed for itching or anxiety      loperamide (IMODIUM) 2 MG capsule Take 4 mg by mouth 4 times daily as needed for diarrhea (Patient not taking: Reported on 10/17/2023)      loratadine-pseudoePHEDrine (CLARITIN-D 24-HOUR)  MG 24 hr tablet Take 1 tablet by mouth daily as needed for allergies Usually takes in Spring/Fall (Patient not taking: Reported on 10/17/2023)      ondansetron (ZOFRAN) 8 MG tablet Take 1 tablet (8 mg) by mouth every 8 hours as needed (Nausea/Vomiting) 90 tablet 2    oxyCODONE (ROXICODONE) 5 MG tablet Take 1 tablet (5 mg) by mouth 3 times daily as needed for breakthrough pain or moderate to severe pain Maximum 3 pills/day. 90 tablet 0    pantoprazole (PROTONIX) 40 MG EC tablet TAKE 1 TABLET BY MOUTH TWICE A  tablet 1    sennosides (SENOKOT) 8.6 MG tablet 1-2 tablets 1-2 times daily as needed. 120 tablet 1    simethicone 80 MG TABS Take 1 tablet by mouth every 6 hours as needed (bloating, gas, belching) 90 tablet 3    SUMAtriptan (IMITREX) 100 MG tablet Take 100 mg by mouth at onset of headache for migraine         [place]  Prescription [place] reviewed 11/17/2023.  No concern for abuse or misuse of controlled medications based on this report.  10/18/2023 Oxycodone 5mg 90 tabs for 30 days  09/20/2023 Oxycodone 5mg 90 tabs for 30 days  Monthly scripts for oxycodone in 2023 no other medications listed.    Allergies: Pertinent allergies reviewed.     Allergies   Allergen Reactions    Gluten Meal Palpitations    Amoxicillin-Pot Clavulanate Rash     Patient tolerated Zosyn in 7/2020    Oxycontin [Oxycodone] Other (See Comments)     Confusion, loopy, oxycodone is tolerated.    Pt states she tolerates regular oxycodone, cannot take 12 hour oxycontin.      Pregabalin      interfered with Cymbalta    Topiramate      Tongue numbness, taste alteration, irritable     Acetaminophen Nausea     Urine retention      Dust Mite Extract     Gabapentin Dizziness and GI Disturbance    Ketorolac Headache    Latex Rash    Methadone Fatigue    Mold     Naprosyn [Naproxen] Dizziness and GI Disturbance    Seasonal Allergies        Family History:   family history includes Dementia in her father; Heart Failure (age of onset: 77) in her mother; No Known Problems in her brother and sister.    Social History:   She is  and  lives in a house.  She has 1 grown child.  She is on SSD.  She is independent in ADL's.  She has 4 grand children who live in [place].  She  reports that she quit smoking about 3 years ago. Her smoking use included cigarettes. She has a 12.50 pack-year smoking history. She has been exposed to tobacco smoke. She has never used smokeless tobacco. She reports that she does not drink alcohol and does not use drugs.  Social History     Social History Narrative    Not on file         Review of Systems:      (Positive responses bolded)  GENERAL: fever/chills, fatigue, general unwell feeling, weight gain/loss  HEAD/EYES:  headache, dizziness, or vision changes  EARS/NOSE/THROAT: nosebleeds, hearing loss, sinus infection, earache,  tinnitus  IMMUNE:  allergies, cancer, immune deficiency, or infections  SKIN:  itching, rash, hives  HEME/Lymphatic: anemia, easy bruising, easy bleeding  RESPIRATORY: cough, wheezing, or shortness of breath  CARDIOVASCULAR/Circulation: extremity edema, syncope, hypertension, tachycardia, or angina  GASTROINTESTINAL: abdominal pain, nausea/emesis, diarrhea, constipation, hematochezia, or melena  ENDOCRINE:  diabetes, steroid use, thyroid disease or osteoporosis  MUSCULOSKELETAL: myalgias, joint pain, stiffness, neck pain, back pain, arthritis, or gout  GENITOURINARY: frequency, urgency, dysuria, difficulty voiding, hematuria or incontinence  NEUROLOGIC: weakness, numbness, paresthesias, seizure, tremor, stroke or memory loss  PSYCHIATRIC: depression, anxiety, stress, suicidal thoughts/attempts or mood swings    Physical Exam:  [facility] 09/23/2014     Constitutional: She is oriented to person, place, and time.  She appears well-developed and well-nourished. She is not in acute distress.   HENT:     Head: Normocephalic and atraumatic.     Eyes: Pupils are equal, round, and reactive to light. EOM are normal. No scleral icterus.   Pulmonary/Chest:  NWOB. No respiratory distress.   Neurological: She is alert and oriented to person, place, and time. Coordination grossly normal.  Romberg test negative. Powell's test is negative  Skin: Skin is warm and dry. She is not diaphoretic.   Psychiatric: She has a normal mood and affect. Her behavior is normal. Judgment and thought content normal.  Patient answers questions appropriately.  MSK: Gait is antalgic.  Patient can walk on toes, heals, heal toe walk and perform heal to shin testing without difficulty.  She had the most difficulty with heal to shin testing.    Narrative & Impression   EXAMINATION: CT CHEST/ABDOMEN/PELVIS W CONTRAST, 11/13/2023 12:03 PM     TECHNIQUE:  Helical CT images from the thoracic inlet through the  symphysis pubis were obtained with IV contrast. Contrast  dose: Isovue  370 88cc     COMPARISON: CT abdomen and pelvis dated 8/30/2023, CT chest abdomen  pelvis dated 5/17/2023.     HISTORY: Malignant neoplasm of pyloric antrum (H)     FINDINGS:  CHEST:  LUNGS: Central tracheobronchial tree is patent. No pneumothorax or  pleural effusion. No focal airspace opacity. No suspicious pulmonary  nodule. Unchanged subcentimeter nodule along the left fissure likely  represents an intrafissural lymph node. Stable few sub-4 mm pulmonary  nodules are likely benign.      MEDIASTINUM: Right port catheter terminates in distal SVC. Heart size  is within normal limits. No pericardial effusion. Ascending aorta and  main pulmonary artery diameters are within normal limits. Normal  appearance and configuration of the great vessels off of the aortic  arch. No suspicious mediastinal, hilar, or axillary lymph nodes.      Visualized thyroid and esophagus are unremarkable.     ABDOMEN/PELVIS:  LIVER: No suspicious focal hepatic lesion.     BILIARY: Gallbladder is surgically absent. Unchanged mild pneumobilia.  No intrahepatic or extrahepatic biliary ductal dilation.     PANCREAS: Post Whipple's procedure. No focal pancreatic lesion. No  pancreatic duct dilatation.     SPLEEN: Within normal limits.     ADRENAL GLANDS: No focal adrenal nodule.     URINARY TRACT: No suspicious renal lesion. No hydronephrosis or  hydroureter. No renal stone. Urinary bladder is unremarkable.     REPRODUCTIVE ORGANS: Within normal limits.     BOWEL: Partial gastrectomy and gastrojejunostomy. Extensive colonic  diverticulosis. Normal caliber large and small bowel. No abnormal  bowel wall thickening or enhancement. Appendix is unremarkable.     PERITONEUM/FLUID: No ascites or pelvic free fluid.     VESSELS: No aneurysmal dilatation of the abdominal aorta.  The portal,  splenic, and superior mesenteric veins are patent.  The origins of the  celiac and superior mesenteric arteries are patent.     LYMPH NODES: No  lymphadenopathy.     BONES/SOFT TISSUES: No aggressive osseous lesions. Degenerative  changes in the spine. L1 hemangioma. Few chronic right rib fractures.  Stable small circumscribed lucent focus in the sternum is likely  benign. Left hip arthroplasty. Left SI joint fixation hardware.  Changes of right inguinal hernia repair.                                                                       IMPRESSION:   No evidence of metastatic disease in the chest, abdomen, or pelvis.     CHERYL SOLIZ MD                  Narrative & Impression   EXAM: MR LUMBAR SPINE W/O CONTRAST  LOCATION: [facility] [place]  DATE: 9/3/2023     INDICATION: History of lumbar lami (+10yrs), primarily axial LBP>BLE radicular pain.  COMPARISON: Plain film imaging 08/29/2023 of the lumbar spine, prior body CT 08/30/2023 also reviewed.  TECHNIQUE: Routine Lumbar Spine MRI without IV contrast.     FINDINGS:   Nomenclature is based on 5 lumbar type vertebral bodies. Rudimentary 12th ribs are identified. Partial sacralization left L5. Correlate with plain film imaging if there is future plan for intervention. Satisfactory lumbar vertebral body height. 2 mm   degenerative anterior subluxation L3-L4 as on prior body CT. No high-grade subluxations. No marrow or ligamentous edema. Nothing for sacral insufficiency or stress fracture. Nothing for a marrow-infiltrative process. Benign vertebral body hemangioma at   L1. Interspace narrowing lower thoracic level and at L3-L4. Congenital narrowing of the L5-S1 interspace. Left SI joint space fusion hardware with degenerative changes right SI joint. Sacral neural foramina are intact. Normal distal spinal cord and cauda   equina with conus medullaris at L1. Rightward lumbar curve. Nerve roots of the cauda equina are satisfactory without nodularity thickening. No retroperitoneal solid mass or adenopathy. Symmetric psoas appearance. Normal caliber abdominal aorta.   Satisfactory renal  contours.     T12-L1: Moderate loss of disc height with annular bulge. No disc herniation, spinal stenosis or foraminal narrowing is present. Facet joints are satisfactory.      L1-L2: Normal disc height and signal. Mild annular bulge. No herniation. Normal facets. No spinal canal or neural foraminal stenosis.     L2-L3: Mild loss of disc height with annular bulge. No disc herniation. No spinal stenosis. Mild foraminal narrowing bilaterally. Mild facet joint hypertrophic changes are present.      L3-L4: Mild loss of disc height. Low-grade degenerative anterior subluxation. Generalized disc bulge. No disc herniation. Mild foraminal narrowing inferiorly. No spinal stenosis. Mild facet joint arthropathy bilaterally.     L4-L5: Normal disc height and signal. Annular bulge. No herniation. Normal facets. No spinal canal or right neural foraminal stenosis. Mild left foraminal narrowing.     L5-S1: Partial sacralization on the left. Interspace narrowing on a congenital/developmental basis with disc desiccation. No disc herniation. No spinal stenosis or foraminal narrowing.                                                                      IMPRESSION:  1.  See above for counting nomenclature, stable from prior body CT examination 08/30/2023 with partial sacralization left L5. Correlate with plain film imaging if there is future plan for intervention.     2.  Low-grade degenerative anterior subluxation L3 upon L4. No high-grade subluxations. No marrow or ligamentous edema.     3.  No evidence for severe spinal stenosis or severe foraminal compromise at any lumbar level.     4.  See above for additional details and full level by level description.         EXAM: XR SACRUM/COCCYX   LOCATION: [facility]   DATE/TIME: 6/14/2021 10:39 AM     INDICATION: Tailbone pain for a year, history of adenocarcinoma of the stomach   COMPARISON: None.     IMPRESSION: Postsurgical change with cage at the left SI joint. Left hip  arthroplasty. No fracture or malalignment. No osseous lesion. L5-S1 degenerative disc changes with facet arthrosis. Right hip is intact.     EXAM: XR SHOULDER RIGHT G/E 3 VIEWS  LOCATION: [facility]  DATE/TIME: 9/18/2022 12:10 PM     INDICATION: Pain following fall.  COMPARISON: None.                                                                      IMPRESSION: No fractures are identified. Normal glenohumeral alignment. Mild degenerative changes in the glenohumeral joint. The acromioclavicular joint is unremarkable. Right-sided port catheter.      EMG:  na      Diagnosis:  (G62.0,  T45.1X5A) Peripheral neuropathy due to chemotherapy  (primary encounter diagnosis)  Comment: She failed gabapentin, pregabalin and amitriptyline  Plan: Discussed [facility], Physical starts Monday      (M25.511,  G89.29) Chronic right shoulder pain  Comment: Will obtain MRI R) shoulder schedule   Plan: Physical Therapy starts Monday, MR Shoulder Right         w/o Contrast            (M51.36) DDD (degenerative disc disease), lumbar  Comment: had lumbar MBB which did not warrant 2nd diagnostic block.  No further appts with Dr. Urbina.  Plan: Physical Therapy starts on Monday            (M62.838) Muscle spasm  Comment:   Plan: Continue cyclobenzaprine and stretching      (F41.9,  F32.A) Anxiety and depression  Comment:   Plan: continue celexa, consider pain psychology in the future.    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     (Z79.891) Use of opiates for therapeutic purposes  Comment:   Plan: UDS appropriate on 11/03/2023 and opioid agreement signed on file              Plan on 11/17/2023:  Medications:    Refill oxycodone 5mg TID and amitriptyline 75mg q hs with 3 refills.    Amitriptyline 75mg q hs   Celexa 20mg 1.5 tabs daily 30mg daily  Compounding medication  Cyclobenzaprine 10 mg TID PRN  Hydroxyzine 25mg 1-2 tabs  QID anxiety  Zofran 8mg prn nausea with migraines  Oxycodone 5mg TID - last dose this am.    Stool softeners PRN  Imitrex 100mg PRN HA - per PCP        Follow up:   Follow-up in clinic in 3-4 wks after R) shoulder MRI to review, make further recommendations and for refills..    UDS and opioid agreement signed on 11/03/2023.          Desi Amaya CNP          BILLING TIME DOCUMENTATION:   The total TIME spent on this patient on the date of the encounter/appointment was 30 minutes.

## 2023-11-17 NOTE — PROGRESS NOTES
Patient presents to the clinic today for a visit with NINA Lott CNP regarding Pain Management.    {Rooming staff  Please complete the PEG Assessment  Assess Pain, Function, and Quality of Life. Complete every pain visit :[id number]}      8/29/2023    10:38 AM 11/3/2023     7:55 AM 11/17/2023     9:22 AM   PEG Score   PEG Total Score 8 6 5.33       UDS/CSA- 11.03.2023    Medications- Oxycodone this am @630    QUESTIONS:Hands hurt. Shoulders from driving. Neuropathy from knees to feet. Not seeing other Dr. Belcher because the injection wasn't sufficient for another nerve block. Cats scan last week . Has hemangioma on L1. Three chronic rib fractures    Stephanie Russell  [facility] Visit Facilitator

## 2023-11-17 NOTE — PROGRESS NOTES
Erroneous encounter   Jada Quiñones MA  [facility] [place] [facility] [place]

## 2023-11-18 ENCOUNTER — HOSPITAL ENCOUNTER (OUTPATIENT)
Dept: MRI IMAGING | Facility: HOSPITAL | Age: 58
Discharge: HOME | End: 2023-11-18
Attending: NURSE PRACTITIONER | Admitting: NURSE PRACTITIONER
Payer: MEDICARE

## 2023-11-18 DIAGNOSIS — M25.511 CHRONIC RIGHT SHOULDER PAIN: ICD-10-CM

## 2023-11-18 DIAGNOSIS — G89.29 CHRONIC RIGHT SHOULDER PAIN: ICD-10-CM

## 2023-11-18 PROCEDURE — G1010 CDSM STANSON: HCPCS

## 2023-11-20 ENCOUNTER — THERAPY VISIT (OUTPATIENT)
Dept: PHYSICAL THERAPY | Facility: REHABILITATION | Age: 58
End: 2023-11-20
Payer: MEDICARE

## 2023-11-20 DIAGNOSIS — G89.29 CHRONIC RIGHT SHOULDER PAIN: Primary | ICD-10-CM

## 2023-11-20 DIAGNOSIS — M25.511 CHRONIC RIGHT SHOULDER PAIN: Primary | ICD-10-CM

## 2023-11-20 PROCEDURE — 97140 MANUAL THERAPY 1/> REGIONS: CPT | Mod: GP | Performed by: PHYSICAL THERAPIST

## 2023-11-20 PROCEDURE — 97161 PT EVAL LOW COMPLEX 20 MIN: CPT | Mod: GP | Performed by: PHYSICAL THERAPIST

## 2023-11-20 PROCEDURE — 97110 THERAPEUTIC EXERCISES: CPT | Mod: GP | Performed by: PHYSICAL THERAPIST

## 2023-11-20 NOTE — PROGRESS NOTES
"PHYSICAL THERAPY EVALUATION  Type of Visit: Evaluation    See electronic medical record for Abuse and Falls Screening details.    Subjective       Presenting condition or subjective complaint: Bad shoulder with limited range  Date of onset: 11/03/23    Relevant medical history:     Dates & types of surgery: Stomach cancer, Whipple, leg shorten,   Left retina surgery, Cataracts bith eyes, hernia,  right foot surgery    Prior diagnostic imaging/testing results: MRI     Prior therapy history for the same diagnosis, illness or injury:        ADDITIONAL SUBJECTIVE: After pt completed chemo in 2020, pt feels like her joints have deteriorated.  She has since had exacerbated R shoulder pain.  History of R shoulder pain of 10+ years.  As a result, pt drives mostly with her L hand and notes increased pain with carrying laundry or with UB dressing.      Prior Level of Function  Transfers: Independent  Ambulation: Independent    Living Environment  Social support: With a significant other or spouse   Type of home: House   Stairs to enter the home: Yes 2 Is there a railing: No   Ramp: No   Stairs inside the home: Yes 12 Is there a railing: No   Help at home: Other  Equipment owned: Straight Cane; Four-point cane; Walker with wheels; Crutches; Power wheelchair or scooter; Raised toilet seat; Bath bench     Employment: No    Hobbies/Interests: Rides in the car, visit family, go for walks, play Leiyoo    Patient goals for therapy: Make it easier to move and drive    Pain assessment: Pain present     Objective   SHOULDER EVALUATION  PAIN: Pain Level at Rest: 6/10  Pain Level with Use: 9/10  Pain Location: shoulder and \"all in the middle of the shoulder, I feel it on all sides.\"  Pain Quality: Aching, Sharp, and \"like electrical charges\"  Pain Frequency: constant  Pain is Worst: at night when she sleeps on her R side  Pain is Exacerbated By: Sleeping on R side, Driving, Walking dog when it pulls, carrying heavy loads, " reaching overhead  Pain is Relieved By: cold  Pain Progression: Worsened  INTEGUMENTARY (edema, incisions): WNL  POSTURE: Sitting Posture: Rounded shoulders, Forward head  GAIT:   Weightbearing Status:  FWB  Assistive Device(s): Cane (single end)  Gait Deviations:  Appears mildly unsteady  BALANCE/PROPRIOCEPTION:  h/o falls; had a L JOSE in 2015 and has been using a SEC ever since.    ROM:   (Degrees) Right AROM  Right PROM   Shoulder Flexion 125 157   Shoulder Extension     Shoulder Abduction 105 1457   Shoulder Internal Rotation 45 65   Shoulder External Rotation 20 42   Elbow Extension WFL    Elbow Flexion WFL    Pain: With all R shoulder AROM and PROM      STRENGTH:  L Shoulder is WFL; R flexion and IR are weak and slightly painful with MMT  FLEXIBILITY:  Limited due to pain    SPECIAL TESTS:  Limited due to shoulder ROM and general pain  PALPATION:  No specific tenderness to rotator cuff  JOINT MOBILITY:  Limited  CERVICAL SCREEN: WNL    Assessment & Plan   CLINICAL IMPRESSIONS  Medical Diagnosis: Chronic R shoulder pain    Treatment Diagnosis: R shoulder pain   Impression/Assessment: Patient is a 58 year old female with R shoulder pain and decreased ROM complaints. The following significant findings have been identified: Pain, Decreased ROM/flexibility, Decreased joint mobility, Decreased strength, and Impaired muscle performance. These impairments interfere with their ability to perform self care tasks and household chores as compared to previous level of function. Signs and symptoms are consistent with adhesive capsulitis and ROM loss of the R shoulder follows a capsular pattern. Pt would benefit from skilled 1:1 PT to address her physical and functional impairments.     Clinical Decision Making (Complexity):  Clinical Presentation: Stable/Uncomplicated  Clinical Presentation Rationale: based on medical and personal factors listed in PT evaluation  Clinical Decision Making (Complexity): Low complexity    PLAN  OF CARE  Treatment Interventions:  Interventions: Manual Therapy, Neuromuscular Re-education, Therapeutic Activity, Therapeutic Exercise, Self-Care/Home Management    Long Term Goals     PT Goal 1  Goal Description: Pt will be independent with her HEP for ongoing symptom management in 12 weeks.  PT Goal 2  Goal Description: Pt will improve R shoulder AROM by 20 deg in all directions for greater ease of overhead reach in 12 weeks.  PT Goal 3  Goal Description: Pt will complete UB dressing with max c/o R shoulder pain to be 2-3/10 in 12 weeks.  PT Goal 4  Goal Description: Pt will be able to sleep uniterrupted due to improved R shoulder pain in 12 weeks.  PT Goal 5  Goal Description: Pt will be able to drive using her R arm with max c/o 2-3/10 shoulder pain in 12 weeks.      Frequency of Treatment: 1x/wk to every other week  Duration of Treatment: 10 visits over 12 weeks    Recommended Referrals to Other Professionals:  None  Education Assessment:   Learner/Method: Patient  Education Comments: POC and HEP    Risks and benefits of evaluation/treatment have been explained.   Patient/Family/caregiver agrees with Plan of Care.     Evaluation Time:     PT Eval, Low Complexity Minutes (99869): 18     Signing Clinician: Mary Ann Friedman, PT      [facility]                                                                                   OUTPATIENT PHYSICAL THERAPY      PLAN OF TREATMENT FOR OUTPATIENT REHABILITATION   Patient's Last Name, First Name, Nathalie Brewster YOB: 1965   Provider's Name   [facility]   Medical Record No.  [id number]     Onset Date: 11/03/23  Start of Care Date: 11/20/23     Medical Diagnosis:  Chronic R shoulder pain      PT Treatment Diagnosis:  R shoulder pain Plan of Treatment  Frequency/Duration: 1x/wk to every other week/ 10 visits over 12 weeks    Certification date from 11/20/23 to 02/12/24         See note for  plan of treatment details and functional goals     Mary Ann Friedman, PT                         I CERTIFY THE NEED FOR THESE SERVICES FURNISHED UNDER        THIS PLAN OF TREATMENT AND WHILE UNDER MY CARE     (Physician attestation of this document indicates review and certification of the therapy plan).              Referring Provider:  Desi Amaya CNP    Initial Assessment  See Epic Evaluation- Start of Care Date: 11/20/23

## 2023-12-04 ENCOUNTER — TRANSCRIBE ORDERS (OUTPATIENT)
Dept: OTHER | Age: 58
End: 2023-12-04

## 2023-12-04 DIAGNOSIS — G89.4 CHRONIC PAIN SYNDROME: Primary | ICD-10-CM

## 2023-12-05 RX ORDER — AMITRIPTYLINE HYDROCHLORIDE 75 MG/1
75 TABLET ORAL AT BEDTIME
Qty: 30 TABLET | Refills: 3 | Status: SHIPPED | OUTPATIENT
Start: 2023-12-05 | End: 2024-03-26

## 2023-12-07 DIAGNOSIS — M79.2 NEUROPATHIC PAIN: ICD-10-CM

## 2023-12-07 DIAGNOSIS — G89.3 CHRONIC PAIN DUE TO NEOPLASM: ICD-10-CM

## 2023-12-07 RX ORDER — OXYCODONE HYDROCHLORIDE 5 MG/1
5 TABLET ORAL 3 TIMES DAILY PRN
Qty: 90 TABLET | Refills: 0 | Status: SHIPPED | OUTPATIENT
Start: 2023-12-07 | End: 2024-01-08

## 2023-12-07 NOTE — TELEPHONE ENCOUNTER
Pending Prescriptions:                       Disp   Refills    oxyCODONE (ROXICODONE) 5 MG tablet        90 tab*0            Sig: Take 1 tablet (5 mg) by mouth 3 times daily as           needed for breakthrough pain or moderate to           severe pain Maximum 3 pills/day.    [id number] [place], [place] - [address]

## 2023-12-07 NOTE — TELEPHONE ENCOUNTER
[facility] Call Center    Phone Message    May a detailed message be left on voicemail: yes     Reason for Call: Medication Refill Request    Has the patient contacted the pharmacy for the refill? Yes   Name of medication being requested: oxyCODONE (ROXICODONE) 5 MG tablet   Provider who prescribed the medication: Desi Amaya  Pharmacy: [facility] [address]  Date medication is needed: 12/17/23     Action Taken: Message routed to:  Other: [facility]    Travel Screening: Not Applicable

## 2023-12-08 ENCOUNTER — HOSPITAL ENCOUNTER (OUTPATIENT)
Dept: INTERVENTIONAL RADIOLOGY/VASCULAR | Facility: HOSPITAL | Age: 58
Discharge: HOME | End: 2023-12-08
Attending: INTERNAL MEDICINE | Admitting: RADIOLOGY
Payer: MEDICARE

## 2023-12-08 VITALS
TEMPERATURE: 98 F | RESPIRATION RATE: 16 BRPM | OXYGEN SATURATION: 100 % | HEART RATE: 93 BPM | DIASTOLIC BLOOD PRESSURE: 57 MMHG | SYSTOLIC BLOOD PRESSURE: 99 MMHG

## 2023-12-08 DIAGNOSIS — Z85.028 HISTORY OF GASTRIC CANCER: ICD-10-CM

## 2023-12-08 PROCEDURE — 99152 MOD SED SAME PHYS/QHP 5/>YRS: CPT

## 2023-12-08 PROCEDURE — 36590 REMOVAL TUNNELED CV CATH: CPT

## 2023-12-08 PROCEDURE — 250N000011 HC RX IP 250 OP 636: Performed by: NURSE PRACTITIONER

## 2023-12-08 PROCEDURE — 250N000009 HC RX 250: Performed by: NURSE PRACTITIONER

## 2023-12-08 PROCEDURE — 250N000009 HC RX 250: Performed by: RADIOLOGY

## 2023-12-08 RX ORDER — NALOXONE HYDROCHLORIDE 0.4 MG/ML
0.4 INJECTION, SOLUTION INTRAMUSCULAR; INTRAVENOUS; SUBCUTANEOUS
Status: DISCONTINUED | OUTPATIENT
Start: 2023-12-08 | End: 2023-12-09 | Stop reason: HOSPADM

## 2023-12-08 RX ORDER — ONDANSETRON 2 MG/ML
4 INJECTION INTRAMUSCULAR; INTRAVENOUS
Status: DISCONTINUED | OUTPATIENT
Start: 2023-12-08 | End: 2023-12-09 | Stop reason: HOSPADM

## 2023-12-08 RX ORDER — LIDOCAINE 40 MG/G
CREAM TOPICAL
Status: DISCONTINUED | OUTPATIENT
Start: 2023-12-08 | End: 2023-12-09 | Stop reason: HOSPADM

## 2023-12-08 RX ORDER — NALOXONE HYDROCHLORIDE 0.4 MG/ML
0.2 INJECTION, SOLUTION INTRAMUSCULAR; INTRAVENOUS; SUBCUTANEOUS
Status: DISCONTINUED | OUTPATIENT
Start: 2023-12-08 | End: 2023-12-09 | Stop reason: HOSPADM

## 2023-12-08 RX ORDER — FENTANYL CITRATE 50 UG/ML
25-50 INJECTION, SOLUTION INTRAMUSCULAR; INTRAVENOUS EVERY 5 MIN PRN
Status: DISCONTINUED | OUTPATIENT
Start: 2023-12-08 | End: 2023-12-09 | Stop reason: HOSPADM

## 2023-12-08 RX ORDER — LIDOCAINE HYDROCHLORIDE AND EPINEPHRINE 10; 10 MG/ML; UG/ML
INJECTION, SOLUTION INFILTRATION; PERINEURAL PRN
Status: COMPLETED | OUTPATIENT
Start: 2023-12-08 | End: 2023-12-08

## 2023-12-08 RX ORDER — FLUMAZENIL 0.1 MG/ML
0.2 INJECTION, SOLUTION INTRAVENOUS
Status: DISCONTINUED | OUTPATIENT
Start: 2023-12-08 | End: 2023-12-09 | Stop reason: HOSPADM

## 2023-12-08 RX ADMIN — LIDOCAINE HYDROCHLORIDE 15 ML: 10 INJECTION, SOLUTION INFILTRATION; PERINEURAL at 13:29

## 2023-12-08 RX ADMIN — MIDAZOLAM HYDROCHLORIDE 1 MG: 1 INJECTION, SOLUTION INTRAMUSCULAR; INTRAVENOUS at 13:17

## 2023-12-08 RX ADMIN — FENTANYL CITRATE 50 MCG: 50 INJECTION, SOLUTION INTRAMUSCULAR; INTRAVENOUS at 13:19

## 2023-12-08 RX ADMIN — FENTANYL CITRATE 50 MCG: 50 INJECTION, SOLUTION INTRAMUSCULAR; INTRAVENOUS at 13:24

## 2023-12-08 RX ADMIN — LIDOCAINE HYDROCHLORIDE AND EPINEPHRINE 10 ML: 10; 10 INJECTION, SOLUTION INFILTRATION; PERINEURAL at 13:29

## 2023-12-08 RX ADMIN — MIDAZOLAM HYDROCHLORIDE 1 MG: 1 INJECTION, SOLUTION INTRAMUSCULAR; INTRAVENOUS at 13:22

## 2023-12-08 NOTE — PROCEDURES
Interventional Radiology Pre-Procedure Assessment  Outpatient - [facility] - 12/08/23    Procedure requested: Right port removal  Requesting provider: Magaly Pollard MD    H&P documented within 30 days by Desi Amaya APRN CNP on 11/17/2023. I have personally reviewed this patient's medical history and have updated the medical record as necessary.    Brief HPI  Nathalie Bashir is a 58 year old female with a pertinent past medical history of gastric adenocarcinoma s/p surgical intervention and oncology therapies now in remission who presents today for port removal.     IMAGING  3/26/2020 PORT PLACEMENT  PROCEDURE:  A 6 English peel-away sheath was placed into the vein. The chest and neck were anesthetized with lidocaine. A 2 cm skin incision was made in the chest. A pocket for a port was created using blunt dissection. The pocket was washed with antibiotic-laden saline. The pocket was packed with antibiotic-laden gauze. A tunnel for the port was created from the pocket to the neck. The port catheter was pulled through, and the attached port was then placed into the pocket. The port was secured in the pocket using 2 Ethilon around the port nozzle. The catheter was measured to appropriate length and was placed through the peel-away sheath. The tip was positioned in the mid to high right atrium. The pocket was closed with three 3-0 Vicryl deep interrupted stitches and Dermabond. The neck incision site was closed with Dermabond. The port was accessed, aspirated, flushed with saline and heparin  locked. A dressing was applied.                                                                   IMPRESSION: Power port placed as above. The port is ready to use.    NPO since Midnight  ANTICOAGULANT(S): None    ALLERGIES  Allergies   Allergen Reactions    Gluten Meal Palpitations    Amoxicillin-Pot Clavulanate Rash     Patient tolerated Zosyn in 7/2020    Oxycontin [Oxycodone] Other (See  Comments)     Confusion, loopy, oxycodone is tolerated.    Pt states she tolerates regular oxycodone, cannot take 12 hour oxycontin.      Pregabalin      interfered with Cymbalta    Topiramate      Tongue numbness, taste alteration, irritable     Acetaminophen Nausea     Urine retention      Dust Mite Extract     Gabapentin Dizziness and GI Disturbance    Ketorolac Headache    Latex Rash    Methadone Fatigue    Mold     Naprosyn [Naproxen] Dizziness and GI Disturbance    Seasonal Allergies      LABORATORY VALUES  INR   Date Value Ref Range Status   12/07/2020 1.21 (H) 0.86 - 1.14 Final     Hemoglobin   Date Value Ref Range Status   11/13/2023 12.7 11.7 - 15.7 g/dL Final     EXAM  /54   Pulse 84   Temp 98.1  F (36.7  C) (Oral)   Resp 18   LMP 09/23/2014   SpO2 98%   General: No apparent acute distress.  Respiratory: Normal depth and regular rhythm. Clear and equal throughout without adventitious sounds.  Cardiovascular: S1 & S2; regular rate and rhythm.  Integumentary: No excoriation, ecchymosis, erythema, lesions or open sores to right chest wall. Port incision healed.  Neurological: Alert and oriented. Thoughts coherent; speech clear and logical.  Psychiatric: Appropriate mood and affect.    PRE-SEDATION ASSESSMENT  Mallampati Airway Classification:  II - Faucial pillars and soft palate may be seen, but uvula is masked by the base of the tongue  Previous reaction to anesthesia/sedation:  No  Sedation plan based on assessment: Moderate (conscious) sedation  ASA Classification: Class 2 - MILD SYSTEMIC DISEASE, NO ACUTE PROBLEMS, NO FUNCTIONAL LIMITATIONS.  Code Status: FULL CODE    ASSESSMENT  58 year old female with gastric adenocarcinoma now in remission who presents today for port removal.    PLAN  # Right port removal with sedation  - Procedural education reviewed with patient and patient's  in detail including but not limited to risks, benefits and alternatives. Patient verbalized  understanding.      Total time spent on the date of the encounter is 35 minutes.    NINA Devine, CNP  Interventional Radiology

## 2023-12-08 NOTE — DISCHARGE INSTRUCTIONS
Port Removal Discharge Instructions:  You had your port-a-catheter removed today. Please follow the below instructions following your port removal:    Care Instructions:  - Avoid tub baths, Jacuzzi and pool soaks for 10 days.  - You may shower beginning tomorrow. Do not scrub site until well healed; pat dry.  - If you experience significant bleeding at site, apply pressure with hands above the clavicle bone, sit upright.    Seek medical assistance for any of the following:   - Uncontrolled bleeding.  - You have a fever (greater than 101 F (38.3C)).  - Purulent (yellow/green/foul smelling) drainage from previous catheter insertion site.  - Increasing redness at previous catheter insertion site.  - Increasing pain at previous catheter insertion site.  - Increasing swelling at previous catheter insertion site.    Contact [facility] RN Line at [phone] with questions or concerns.

## 2023-12-08 NOTE — PRE-PROCEDURE
GENERAL PRE-PROCEDURE:   Procedure:  Right port removal  Date/Time:  12/8/2023 12:16 PM    Written consent obtained?: Yes    Risks and benefits: Risks, benefits and alternatives were discussed    Consent given by:  Patient  Patient states understanding of procedure being performed: Yes    Patient's understanding of procedure matches consent: Yes    Procedure consent matches procedure scheduled: Yes    Expected level of sedation:  Moderate  Appropriately NPO:  Yes  ASA Class:  2  Mallampati  :  Grade 2- soft palate, base of uvula, tonsillar pillars, and portion of posterior pharyngeal wall visible  Lungs:  Lungs clear with good breath sounds bilaterally  Heart:  Normal heart sounds and rate  History & Physical reviewed:  History and physical reviewed and no updates needed  Statement of review:  I have reviewed the lab findings, diagnostic data, medications, and the plan for sedation

## 2023-12-15 NOTE — PROGRESS NOTES
Date:12/18/2023      COMPREHENSIVE PAIN CLINIC FOLLOW UP EVALUATION    Ms. Nathalie Bashir on 11/3/2023 in the [facility] per request of Dr. Dobbs with regards to her pain.  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  UDS and opioid agreement singed and UDS appropriate on 11/03/2023.  Nathalie Bashir has been seen at a pain clinic in the past in [place] 10 years ago, [facility] in 1990s and [facility].      Updates since last appointment on 11/17/2023.  She is attending PT for R) shoulder through [place].  Reviewed R) shoulder MRI.  Recommend R) shoulder steroid injection due to osteoarthritis.  R) shoulder has been painful for years.  Worse with lifting items, reach for things, driving and dressing herself.  Better with rest and ice.  She has neuropathy in her hands and drops items sometimes.  Shoulder pain is achey to sharp at times.  Pain interferes with ADL's and self cares.    She is hosting for the holidays.      Interventions/Injections: none      Progress Notes Reviewed:  11/30/2023 Marianne Gonzalez, APRN  10/17/2023 Dr. 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  Stomach cancer was treated with gastrectomy and chemotherapy.  Doctors found stomach cancer when they did Whipple Surgery 01/2020 at [facility].     Patient has numbness and tingling in legs in the stocking foot distribution and b/l hands.    She also has chronic low back pain. First lumbar MBB was not effective enough to warrant 2nd lumbar  OZIEL.      Characteristics:  No changes in pain characteristics since last appointment.  The patient describes the pain as constant, stabbing, burning, muscle spasms and numbness.   Patient has numbness and tingling in legs in the stocking foot distribution and b/l hands.      What makes the pain better:  Her pain is improved with laying down, rest.    What makes the pain worse:   She reports that the pain is made worse by prolong standing, ADL's.  12/18/2023 current pain on 0/10 VAS:   6  Worst pain:    9   Best pain:  3  11/17/2023 current pain on 0/10 VAS:   6   Worst pain:   9    Best pain:  3  11/03/2023 current pain score at 7/10, but it can be as low as 3/10 or as severe as 9/10.          Current Pain Related Medications:  Any medications changes since last appointment:    Refill oxycodone 5mg TID and amitriptyline 75mg q hs with 3 refills.    Amitriptyline 75mg q hs   Celexa 20mg 1.5 tabs daily 30mg daily  Compounding medication  Cyclobenzaprine 10 mg TID PRN  Hydroxyzine 25mg 1-2 tabs QID anxiety  Zofran 8mg prn nausea with migraines  Oxycodone 5mg TID - last dose this am.    Stool softeners PRN  Imitrex 100mg PRN HA - per PCP  Voltaren gel 1%  Ice/heat prn    NOTE:  allergies to naprosyn, methadone, latex, ketorlac, gabapentin, acetaminophen, topiramate, pregabalin and oxycontin      Therapies discuss on initial consult:   Physical therapy, Pain Psychology, TENs unit, Grounding Mat, Frequency Specific Micro Current, Anti-inflammatory Lifestyle    Social:    She is  and  lives in a house.  She has 1 grown son and 4 grandchildren.  She is independent in ADL's.      Employment: She is on SSD.    Exercise:  Last PT was 2018.  Patient exercises with stretching at home. Starting PT on Monday.    Hobbies:  She enjoys her grandchildren and cooking.    Mental Health:  Patient reports depression is under control on citalopram 30mg.  Patient does not follow with a mental health care provider.                  Updates since initial consult on 11/03/2023.  Dr. Urbina - 1st lumbar MBB only gave  50% - 60% relief and will not proceed with 2nd diagnostic block.  No further follow up is scheduled.    She has MRI R) shoulder tomorrow.    She will start PT on Monday through [facility].    She needs refills of oxycodone.  Last tab was taken this am.  She also needs refills of amitriptyline.    She has been busy with her Grandchildren.       Interventions/Injections: none      Progress Notes Reviewed:  11/16/2023 Dr Magaly Pollard, Hematology & Oncology - no new issues, will discontinue port and followup in 1 year.  11/08/2023 Dr. 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  Stomach cancer was treated with gastrectomy and chemotherapy.  Doctors found stomach cancer when they did Whipple Surgery 01/2020 at [facility].   Patient has not had any spine surgery in the past.   Patient has numbness and tingling in legs in the stocking foot distribution and b/l hands.    She also has chronic low back pain. First lumbar MBB was not effective enough to warrant 2nd lumbar MBB.    Rheumatology could not find any cause for her joint pain.      Characteristics:  No changes in pain characteristics since last appointment.  The patient describes the pain as constant, stabbing, burning and numbness.   Patient has numbness and tingling in legs in the stocking foot distribution and b/l hands.      What makes the pain better:  Her pain is improved with laying down, rest.    What makes the pain worse:   She reports that the pain is made worse by prolong standing, ADL's.  11/17/2023 current pain on 0/10 VAS:   6   Worst pain:   9    Best pain:  3  11/03/2023 current pain score at 7/10,  but it can be as low as 3/10 or as severe as 9/10.      Current Pain Related Medications:  Any medications changes since last appointment:    Amitriptyline 75mg q hs - she ran out of this medication for sleep.  Not helpful for neuropathic pain.  Celexa 20mg 1.5 tabs daily 30mg daily  Compounding medication  Cyclobenzaprine 10 mg TID PRN  Hydroxyzine 25mg 1-2 tabs QID anxiety  Zofran 8mg prn nausea with migraines  Oxycodone 5mg TID - last dose this am.  Since 2020 with palliative care  Stool softeners PRN  Imitrex 100mg PRN HA - per PCP    NOTE:  allergies to naprosyn, methadone, latex, ketorlac, gabapentin, acetaminophen, topiramate, pregabalin and oxycontin        Therapies discuss on initial consult:   Physical therapy, Pain Psychology, TENs unit, Grounding Mat, Frequency Specific Micro Current, Anti-inflammatory Lifestyle    Social:    She is  and  lives in a house.  She has 1 grown son and 4 grandchildren.  She is independent in ADL's.      Employment: She is on SSD.    Exercise:  Last PT was 2018.  Patient exercises with stretching at home. Starting PT on Monday.    Hobbies:  She enjoys her grandchildren.    Mental Health:  Patient reports depression is under control on citalopram 30mg.  Patient does not follow with a mental health care provider.            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  Stomach cancer was treated with gastrectomy and chemotherapy.  Doctors found stomach cancer when they did Whipple Surgery 01/2020 at [facility].  Patient has numbness and tingling in legs in the stocking foot distribution and b/l hands.  Patient has lumbar pain and has had lumbar MBB x 1 with Dr. Urbina.  Patient has not had any spine surgery in the past.  The  patient describes the pain as constant, stabbing, burning and numbness.  She reports that the pain is made worse by prolong standing, ADL's.  Her pain is improved with laying down, rest.  She rates her currenty pain score at 7/10, but it can be as low as 3/10 or as severe as 9/10. Patient follows with Dr. Urbina for lumbar intervention options.  Rheumatology could not find any cause for her joint pain.      Progress Notes Reviewed:  10/19/2023 Dr. Dobbs, PM&R - order quad cane and compression stockings, follow-up with Dr. Urbina for low back pain  10/17/2023 Dr. Mckoy, Rheumatology  10/01/2023 UC - UTI  09/27/2023 Dr. Urbina - lumbar MBB L4-5 and L5-S1  09/12/2023 Dr. Urbina    Patient reports depression is under control on citalopram 30mg.  Patient does not follow with a mental health care provider.  Last PT was 2018.  Patient exercises with stretching at home.    She denies any new problems with falls or balance, any new numbness or weakness of the arms or legs, any new bowel or bladder incontinence, any night sweats or unexplained fevers, or any sudden or unexpected weight loss.      Nathalie Bashir has been seen at a pain clinic in the past in [place] 10 years ago, [facility] in 1990s and  [facility].      Current Treatments:  Amitriptyline 75mg q hs - she ran out of this medication for sleep.  Not helpful for neuropathic pain.  Celexa 20mg 1.5 tabs daily 30mg daily  Compounding medication  Cyclobenzaprine 10 mg TID PRN  Hydroxyzine 25mg 1-2 tabs QID anxiety  Zofran 8mg prn nausea with migraines  Oxycodone 5mg TID - since 2020 with palliative care  Stool softeners PRN  Imitrex 100mg PRN HA    NOTE:  allergies to naprosyn, methadone, latex, ketorlac, gabapentin, acetaminophen, topiramate, pregabalin and oxycontin    Previous Medication Treatments Included:  Anti-convulsants: Gabapentin, pregabalin had negative SE  Muscle relaxors: Tizanidine not helpful  Anti-depressants: amitriptyline was not helpful for  pain, duloxetine had negative SE with mood  Benzodiazapine's: none  Acetaminophen/NSAIDs: negative SE  Topicals: Voltaren gel 1%  Headache abortives: imitrex  Headache prophylactics:   Opioids: MS ER 30mg BID, Oxycodone 10mg BID at previous pain - [facility] in 1990, buprenorphine caused negative SE      Other Treatments Have Included:  Physical therapy: yes over 2 years ago  Pain Psychology: no  Chiropractic: yes - not helpful  Acupuncture: yes - not helpful  TENs Unit: yes she has one  Injections: 09/27/2023 lumbar MBB #1  Surgeries: L) hip replacement 2015, L) SI fusion 2008  Dry Needling: no  Massage:no    Past Medical History:  Medical history reviewed.  Past Medical History:   Diagnosis Date    Allergic rhinitis     Cancer (H)     stomach cancer    Chronic pain     Closed fracture of distal end of left radius     Depression     Gastric adenocarcinoma (H)     Lumbago     Migraine     Opioid dependence (H)     Other chronic pain     low back    Sacroiliitis (H24)     low back pain    Trochanteric bursitis     leg length discrrepancy, hip pain      Patient Active Problem List   Diagnosis    Lower limb length difference    IPMN (intraductal papillary mucinous neoplasm)    Allergic rhinitis    Arthralgia of hip    Bursitis    Chronic neck pain    Chronic pain disorder    Continuous opioid dependence (H)    Controlled substance agreement signed    Debility    Degeneration of intervertebral disc of lumbosacral region    Depression    Disorder of sacrum    Greater trochanteric bursitis of left hip    Infected prosthesis of left hip (H24)    Chronic bilateral low back pain without sciatica    MDD (major depressive disorder), recurrent, in partial remission (H24)    Migraine with aura    Sacroiliitis (H24)    Screening for malignant neoplasm of cervix    Spondylosis of lumbosacral spine without myelopathy    Status post left hip replacement    Cervical radiculopathy, chronic    Unilateral inguinal hernia without obstruction  or gangrene    Long term (current) use of opiate analgesic    Low urine output    Hypophosphatemia    Leukocytosis    Intra-abdominal fluid collection    Bacteremia due to Gram-negative bacteria    Malnutrition (H24)    Gastric adenocarcinoma (H)    Pneumonia    Mucositis    Chemotherapy-induced neutropenia     Gastric cancer (H)    Cholangitis (H28)    Thrombocytopenia (H24)    Urinary tract infection    Crohn's disease of small intestine (H)    Jejunitis    Colitis    Abdominal pain    Malignant neoplasm of overlapping sites of stomach (H)    Iron deficiency anemia    Anemia    Paralytic ileus (H)    Hypokalemia    Nausea & vomiting    Dysphagia    History of gastric surgery    Chemotherapy-induced neuropathy     Bilateral hand pain    Pyelonephritis    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  Past Surgical History:   Procedure Laterality Date    ARTHROPLASTY HIP Left 2016    BACK SURGERY      L4-5 decompression    CATARACT IOL, RT/LT      CHOLECYSTECTOMY N/A 1/28/2020    Procedure: Cholecystectomy;  Surgeon: Yandel Mallory MD;  Location: [facility] OR    ENDOSCOPIC RETROGRADE CHOLANGIOPANCREATOGRAM N/A 7/27/2020    Procedure: ENDOSCOPIC RETROGRADE CHOLANGIOPANCREATOGRAPHY  **Latex Allergy** with jejunal biopsies;  Surgeon: Jeet Aviles MD;  Location:  OR    ESOPHAGOSCOPY, GASTROSCOPY, DUODENOSCOPY (EGD), COMBINED N/A 3/3/2020    Procedure: ESOPHAGOGASTRODUODENOSCOPY, WITH ENDOSCOPIC US (enoxaparin);  Surgeon: Bakari Plata MD;  Location:  GI    ESOPHAGOSCOPY, GASTROSCOPY, DUODENOSCOPY (EGD), COMBINED N/A 12/7/2020    Procedure: Upper Endoscopy with stent placement;  Surgeon: Jeet Aviles MD;  Location:  OR    ESOPHAGOSCOPY, GASTROSCOPY,  DUODENOSCOPY (EGD), COMBINED N/A 2/8/2021    Procedure: ESOPHAGOGASTRODUODENOSCOPY (EGD)AND STENT REMOVAL;  Surgeon: Jeet Aviles MD;  Location:  OR    EYE SURGERY      GASTRECTOMY N/A 10/21/2020    Procedure: Open subtotal gastectomy with David en Y Reconstruction; D1 Lymph Node Disection  **Latex Allergy**;  Surgeon: Yandel Mallory MD;  Location: [facility] OR    INJECT BLOCK MEDIAL BRANCH CERVICAL/THORACIC/LUMBAR Bilateral 9/27/2023    Procedure: BILATERAL L4-5, L5-S1 medial branch block #1;  Surgeon: Maria Victoria Urbina MD;  Location: [facility] OR    IR CHEST PORT PLACEMENT > 5 YRS OF AGE  3/26/2020    IR LUMBAR EPIDURAL STEROID INJECTION  8/25/2009    IR LUMBAR EPIDURAL STEROID INJECTION  9/2/2009    IR LUMBAR EPIDURAL STEROID INJECTION  9/25/2009    IR LUMBAR EPIDURAL STEROID INJECTION  11/23/2009    IR LUMBAR EPIDURAL STEROID INJECTION  4/1/2010    IR LUMBAR EPIDURAL STEROID INJECTION  9/1/2010    IR LUMBAR EPIDURAL STEROID INJECTION  3/10/2011    IR LUMBAR EPIDURAL STEROID INJECTION  8/30/2011    IR PORT REMOVAL RIGHT  12/8/2023    LAPAROSCOPY DIAGNOSTIC (GENERAL) N/A 10/13/2020    Procedure: Diagnostic laparoscopy with peritoneal washings  **Latex Allergy**;  Surgeon: Yandel Mallory MD;  Location: [facility] OR    OPEN REDUCTION INTERNAL FIXATION RODDING INTRAMEDULLARY FEMUR Left 12/23/2014    Procedure: OPEN REDUCTION INTERNAL FIXATION RODDING INTRAMEDULLARY FEMUR;  Surgeon: Arnol Santiago MD;  [facility] OR    OPEN REDUCTION INTERNAL FIXATION WRIST Left 9/22/2022    Procedure: LEFT WRIST OPEN REDUCTION INTERNAL FIXATION, FRACTURE;  Surgeon: Mark Villalobos MD;  [place] [facility] [place]    [place]TH[place]EDIC SURGERY      PICC DOUBLE LUMEN PLACEMENT Right 02/07/2020    5 fr double lumen 41 cm    IN LAMINEC/FACETECT/FORAMIN,LUMBAR 1 SEG      Description: Laminectomy Decompress, Facetectomy, Foraminotomy Lumbar Seg;  Recorded: 04/12/2012;    REMOVE HARDWARE LOWER EXTREMITY Left 4/7/2015    Procedure: REMOVE HARDWARE LOWER EXTREMITY;   Surgeon: Arnol Santiago MD;  [facility] OR    REMOVE HARDWARE RODDING INTRAMEDULLARY FEMUR Left 12/17/2015    Procedure: REMOVE HARDWARE RODDING INTRAMEDULLARY FEMUR;  Surgeon: Arnol Santiago MD;  [facility]    RESECT BONE LOWER EXTREMITY Left 12/23/2014    Procedure: RESECT BONE LOWER EXTREMITY;  Surgeon: Arnol Santiago MD;  [facility]    SHORTENING OSTEOPLASTY FEMUR W/ IM NAILING      WHIPPLE PROCEDURE N/A 1/28/2020    Procedure: Open Whipple procedure and Cholecystectomy;  Surgeon: Yandel Mallory MD;  Location: [facility] OR    WHIPPLE PROCEDURE      ZZC FUSION OF SACROILIAC JOINT      Description: Arthrodesis Sacroiliac Joint Left;  Recorded: 10/07/2011;    ZZC REPAIR DETACH RETINA,SCLERAL BUCKLE          Medications: Pertinent medications reviewed.  Current Outpatient Medications   Medication Sig Dispense Refill    amitriptyline (ELAVIL) 75 MG tablet Take 1 tablet (75 mg) by mouth at bedtime 30 tablet 3    citalopram (CELEXA) 20 MG tablet Take 1.5 tablets (30 mg) by mouth daily 135 tablet 1    COMPOUNDED NON-CONTROLLED SUBSTANCE (CMPD RX) - PHARMACY TO MIX COMPOUNDED MEDICATION Estriol 1 mg/gram,place 1 gram vaginally three times per week,42.5 grams 1 each 3    Cyanocobalamin 1000 MCG CAPS Take 1 tablet by mouth every morning      cyclobenzaprine (FLEXERIL) 10 MG tablet TAKE 1 TABLET BY MOUTH THREE TIMES A DAY AS NEEDED FOR MUSCLE SPASMS 90 tablet 3    famotidine (PEPCID) 20 MG tablet TAKE 1 TABLET (20 MG) BY MOUTH 2 TIMES DAILY AS NEEDED 180 tablet 1    fluticasone (FLONASE) 50 MCG/ACT nasal spray Spray 1 spray into both nostrils daily at 2 pm      hydrOXYzine (VISTARIL) 25 MG capsule Take 25-50 mg by mouth 4 times daily as needed for itching or anxiety      loperamide (IMODIUM) 2 MG capsule Take 4 mg by mouth 4 times daily as needed for diarrhea      loratadine-pseudoePHEDrine (CLARITIN-D 24-HOUR)  MG 24 hr tablet Take 1 tablet by mouth daily as needed for allergies Usually takes in  Spring/Fall      ondansetron (ZOFRAN) 8 MG tablet Take 1 tablet (8 mg) by mouth every 8 hours as needed (Nausea/Vomiting) 90 tablet 2    oxyCODONE (ROXICODONE) 5 MG tablet Take 1 tablet (5 mg) by mouth 3 times daily as needed for breakthrough pain or moderate to severe pain Maximum 3 pills/day. 90 tablet 0    pantoprazole (PROTONIX) 40 MG EC tablet TAKE 1 TABLET BY MOUTH TWICE A  tablet 1    sennosides (SENOKOT) 8.6 MG tablet 1-2 tablets 1-2 times daily as needed. 120 tablet 1    simethicone 80 MG TABS Take 1 tablet by mouth every 6 hours as needed (bloating, gas, belching) 90 tablet 3    SUMAtriptan (IMITREX) 100 MG tablet Take 100 mg by mouth at onset of headache for migraine         [place] Prescription Monitoring Program reviewed 12/18/2023.  No concern for abuse or misuse of controlled medications based on this report.  12/14/2023 Oxycodone 5mg 90 tabs for 30 days  11/17/2023 Oxycodone 5mg 90 tabs for 30 days  10/18/2023 Oxycodone 5mg 90 tabs for 30 days  09/20/2023 Oxycodone 5mg 90 tabs for 30 days  Monthly scripts for oxycodone in 2023 no other medications listed.    Allergies: Pertinent allergies reviewed.     Allergies   Allergen Reactions    Gluten Meal Palpitations    Amoxicillin-Pot Clavulanate Rash     Patient tolerated Zosyn in 7/2020    Oxycontin [Oxycodone] Other (See Comments)     Confusion, loopy, oxycodone is tolerated.    Pt states she tolerates regular oxycodone, cannot take 12 hour oxycontin.      Pregabalin      interfered with Cymbalta    Topiramate      Tongue numbness, taste alteration, irritable     Acetaminophen Nausea     Urine retention      Dust Mite Extract     Gabapentin Dizziness and GI Disturbance    Ketorolac Headache    Latex Rash    Methadone Fatigue    Mold     Naprosyn [Naproxen] Dizziness and GI Disturbance    Seasonal Allergies        Family History:   family history includes Dementia in her father; Heart Failure (age of onset: 77) in her mother; No Known Problems in her  brother and sister.    Social History:   She is  and  lives in a house.  She has 1 grown child.  She is on SSD.  She is independent in ADL's.  She has 4 grand children who live in [place].  She  reports that she quit smoking about 3 years ago. Her smoking use included cigarettes. She has a 12.50 pack-year smoking history. She has been exposed to tobacco smoke. She has never used smokeless tobacco. She reports that she does not drink alcohol and does not use drugs.  Social History     Social History Narrative    Not on file         Physical Exam:  [facility] 09/23/2014     Constitutional: She is oriented to person, place, and time.  She appears well-developed and well-nourished. She is not in acute distress.   HENT:     Head: Normocephalic and atraumatic.     Eyes: Pupils are equal, round, and reactive to light. EOM are normal. No scleral icterus.   Pulmonary/Chest:  NWOB. No respiratory distress.   Neurological: She is alert and oriented to person, place, and time. Coordination grossly normal.  Skin: Skin is warm and dry. She is not diaphoretic.   Psychiatric: She has a normal mood and affect. Her behavior is normal. Judgment and thought content normal.  Patient answers questions appropriately.  MSK: Gait is antalgic. R) shoulder decreased ROM.  Strength is intact.    Imaging:  EXAM: MR SHOULDER RIGHT WITHOUT CONTRAST  LOCATION: [facility]  DATE: 11/18/2023     INDICATION: Shoulder pain.  COMPARISON: None.  TECHNIQUE: Unenhanced.     FINDINGS:     ROTATOR CUFF:  -Supraspinatus: Mild tendinosis without tearing. Normal muscle without strain or atrophy.  -Infraspinatus: No tendon tear, tendinopathy or fatty atrophy.  -Subscapularis: Mild tendinosis without tear or tendon thinning. Normal muscle without edema or atrophy.  -Teres minor: Normal tendon and muscle.     CORACOACROMIAL ARCH:  -Morphology: Developmental variant os acromiale. Small amount of fluid at the pseudoarticulation. No  significant inflammatory changes or stress reaction. Type I acromial morphology. No subacromial spur. Subacromial and subcoracoid space are normal.   -Bursa: Mild subacromial/subdeltoid bursitis.     ACROMIOCLAVICULAR JOINT:   -Normal alignment. Minimal degenerative arthrosis.      LONG HEAD OF BICEPS TENDON:   -Small tenosynovial effusion. Normal tendon without tear or tendinosis.     GLENOHUMERAL JOINT:   -Labrum: Degenerative surface fraying and blunting of the labrum. No full-thickness tear/avulsion.   -Cartilage: Smooth high-grade cartilage loss, with full-thickness thinning over the glenoid, and mixed high-grade partial-thickness and full-thickness thinning over the humeral head.  -Joint space: Minimally increased joint fluid. Mild synovitis. No loose intra-articular bodies.  -Glenohumeral ligaments and capsule: No pericapsular inflammation.     BONES:   -Negative for fracture, AVN, or bone marrow replacement lesion.  -End-stage degenerative arthritic changes, with marginal osteophytes.     SOFT TISSUES:   -Normal deltoid muscle bulk. Normal visualized chest wall and axilla.                                                                      IMPRESSION:  1.  Advanced osteoarthrosis, with high-grade cartilage loss, including areas of exposed subchondral bone over the glenoid and humeral head. Large marginal osteophytes.     2.  Minimally increased glenohumeral joint fluid. Mild synovitis. No loose bodies.     3.  Intact rotator cuff without full-thickness or partial-thickness tear. Mild supraspinatus and subscapularis tendinosis.     4.  Mild subacromial/subdeltoid bursitis.     5.  Small biceps tenosynovial effusion. No tendon tearing or significant tendinosis.     6.  Incidentally identified os acromiale developmental variant morphology.        Narrative & Impression   EXAMINATION: CT CHEST/ABDOMEN/PELVIS W CONTRAST, 11/13/2023 12:03 PM     TECHNIQUE:  Helical CT images from the thoracic inlet through  the  symphysis pubis were obtained with IV contrast. Contrast dose: Isovue  370 88cc     COMPARISON: CT abdomen and pelvis dated 8/30/2023, CT chest abdomen  pelvis dated 5/17/2023.     HISTORY: Malignant neoplasm of pyloric antrum (H)     FINDINGS:  CHEST:  LUNGS: Central tracheobronchial tree is patent. No pneumothorax or  pleural effusion. No focal airspace opacity. No suspicious pulmonary  nodule. Unchanged subcentimeter nodule along the left fissure likely  represents an intrafissural lymph node. Stable few sub-4 mm pulmonary  nodules are likely benign.      MEDIASTINUM: Right port catheter terminates in distal SVC. Heart size  is within normal limits. No pericardial effusion. Ascending aorta and  main pulmonary artery diameters are within normal limits. Normal  appearance and configuration of the great vessels off of the aortic  arch. No suspicious mediastinal, hilar, or axillary lymph nodes.      Visualized thyroid and esophagus are unremarkable.     ABDOMEN/PELVIS:  LIVER: No suspicious focal hepatic lesion.     BILIARY: Gallbladder is surgically absent. Unchanged mild pneumobilia.  No intrahepatic or extrahepatic biliary ductal dilation.     PANCREAS: Post Whipple's procedure. No focal pancreatic lesion. No  pancreatic duct dilatation.     SPLEEN: Within normal limits.     ADRENAL GLANDS: No focal adrenal nodule.     URINARY TRACT: No suspicious renal lesion. No hydronephrosis or  hydroureter. No renal stone. Urinary bladder is unremarkable.     REPRODUCTIVE ORGANS: Within normal limits.     BOWEL: Partial gastrectomy and gastrojejunostomy. Extensive colonic  diverticulosis. Normal caliber large and small bowel. No abnormal  bowel wall thickening or enhancement. Appendix is unremarkable.     PERITONEUM/FLUID: No ascites or pelvic free fluid.     VESSELS: No aneurysmal dilatation of the abdominal aorta.  The portal,  splenic, and superior mesenteric veins are patent.  The origins of the  celiac and superior  mesenteric arteries are patent.     LYMPH NODES: No lymphadenopathy.     BONES/SOFT TISSUES: No aggressive osseous lesions. Degenerative  changes in the spine. L1 hemangioma. Few chronic right rib fractures.  Stable small circumscribed lucent focus in the sternum is likely  benign. Left hip arthroplasty. Left SI joint fixation hardware.  Changes of right inguinal hernia repair.                                                                       IMPRESSION:   No evidence of metastatic disease in the chest, abdomen, or pelvis.     CHERYL SOLIZ MD                  Narrative & Impression   EXAM: MR LUMBAR SPINE W/O CONTRAST  LOCATION: [facility]  DATE: 9/3/2023     INDICATION: History of lumbar lami (+10yrs), primarily axial LBP>BLE radicular pain.  COMPARISON: Plain film imaging 08/29/2023 of the lumbar spine, prior body CT 08/30/2023 also reviewed.  TECHNIQUE: Routine Lumbar Spine MRI without IV contrast.     FINDINGS:   Nomenclature is based on 5 lumbar type vertebral bodies. Rudimentary 12th ribs are identified. Partial sacralization left L5. Correlate with plain film imaging if there is future plan for intervention. Satisfactory lumbar vertebral body height. 2 mm   degenerative anterior subluxation L3-L4 as on prior body CT. No high-grade subluxations. No marrow or ligamentous edema. Nothing for sacral insufficiency or stress fracture. Nothing for a marrow-infiltrative process. Benign vertebral body hemangioma at   L1. Interspace narrowing lower thoracic level and at L3-L4. Congenital narrowing of the L5-S1 interspace. Left SI joint space fusion hardware with degenerative changes right SI joint. Sacral neural foramina are intact. Normal distal spinal cord and cauda   equina with conus medullaris at L1. Rightward lumbar curve. Nerve roots of the cauda equina are satisfactory without nodularity thickening. No retroperitoneal solid mass or adenopathy. Symmetric psoas appearance. Normal  caliber abdominal aorta.   Satisfactory renal contours.     T12-L1: Moderate loss of disc height with annular bulge. No disc herniation, spinal stenosis or foraminal narrowing is present. Facet joints are satisfactory.      L1-L2: Normal disc height and signal. Mild annular bulge. No herniation. Normal facets. No spinal canal or neural foraminal stenosis.     L2-L3: Mild loss of disc height with annular bulge. No disc herniation. No spinal stenosis. Mild foraminal narrowing bilaterally. Mild facet joint hypertrophic changes are present.      L3-L4: Mild loss of disc height. Low-grade degenerative anterior subluxation. Generalized disc bulge. No disc herniation. Mild foraminal narrowing inferiorly. No spinal stenosis. Mild facet joint arthropathy bilaterally.     L4-L5: Normal disc height and signal. Annular bulge. No herniation. Normal facets. No spinal canal or right neural foraminal stenosis. Mild left foraminal narrowing.     L5-S1: Partial sacralization on the left. Interspace narrowing on a congenital/developmental basis with disc desiccation. No disc herniation. No spinal stenosis or foraminal narrowing.                                                                      IMPRESSION:  1.  See above for counting nomenclature, stable from prior body CT examination 08/30/2023 with partial sacralization left L5. Correlate with plain film imaging if there is future plan for intervention.     2.  Low-grade degenerative anterior subluxation L3 upon L4. No high-grade subluxations. No marrow or ligamentous edema.     3.  No evidence for severe spinal stenosis or severe foraminal compromise at any lumbar level.     4.  See above for additional details and full level by level description.         EXAM: XR SACRUM/COCCYX   LOCATION: [facility]   DATE/TIME: 6/14/2021 10:39 AM     INDICATION: Tailbone pain for a year, history of adenocarcinoma of the stomach   COMPARISON: None.     IMPRESSION: Postsurgical change  with cage at the left SI joint. Left hip arthroplasty. No fracture or malalignment. No osseous lesion. L5-S1 degenerative disc changes with facet arthrosis. Right hip is intact.     EXAM: XR SHOULDER RIGHT G/E 3 VIEWS  LOCATION: [facility]  DATE/TIME: 9/18/2022 12:10 PM     INDICATION: Pain following fall.  COMPARISON: None.                                                                      IMPRESSION: No fractures are identified. Normal glenohumeral alignment. Mild degenerative changes in the glenohumeral joint. The acromioclavicular joint is unremarkable. Right-sided port catheter.      EMG:  na      Diagnosis:  (G62.0,  T45.1X5A) Peripheral neuropathy due to chemotherapy  (primary encounter diagnosis)  Comment: She failed gabapentin, pregabalin and amitriptyline  Plan: Discussed [facility], Physical starts Monday      (M25.511,  G89.29) Chronic right shoulder pain  Comment: MRI R) shoulder was reviewed today.  Plan: Continue Physical Therapy and schedule R) shoulder joint steroid injection.            (M51.36) DDD (degenerative disc disease), lumbar  Comment: had lumbar MBB which did not warrant 2nd diagnostic block.  No further appts with Dr. Urbina.  Plan: Physical Therapy starts on Monday.            (M62.838) Muscle spasm  Comment:   Plan: Continue cyclobenzaprine and stretching      (F41.9,  F32.A) Anxiety and depression  Comment:   Plan: continue celexa, consider pain psychology in the future.    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     (F11.90) Chronic, continuous use of opioids  (primary encounter diagnosis)  Comment:   Plan:                   Plan on 12/18/2023:  Medications:    No refills needed today.    Amitriptyline 75mg q hs   Celexa 20mg 1.5 tabs daily 30mg daily  Compounding medication  Cyclobenzaprine 10 mg TID PRN  Hydroxyzine 25mg 1-2 tabs QID anxiety  Zofran 8mg prn nausea  with migraines  Oxycodone 5mg TID - last dose this am.    Stool softeners PRN  Imitrex 100mg PRN HA - per PCP      Interventions:  Schedule R) shoulder steroid injection with Dr. Gallegos.      Follow up:     In clinic in March.        Desi Amaya CNP          BILLING TIME DOCUMENTATION:   The total TIME spent on this patient on the date of the encounter/appointment was 40 minutes.

## 2023-12-18 ENCOUNTER — OFFICE VISIT (OUTPATIENT)
Dept: PALLIATIVE MEDICINE | Facility: OTHER | Age: 58
End: 2023-12-18
Attending: NURSE PRACTITIONER
Payer: MEDICARE

## 2023-12-18 VITALS
OXYGEN SATURATION: 97 % | BODY MASS INDEX: 26.67 KG/M2 | HEART RATE: 95 BPM | SYSTOLIC BLOOD PRESSURE: 105 MMHG | WEIGHT: 178 LBS | DIASTOLIC BLOOD PRESSURE: 61 MMHG

## 2023-12-18 DIAGNOSIS — F11.90 CHRONIC, CONTINUOUS USE OF OPIOIDS: ICD-10-CM

## 2023-12-18 DIAGNOSIS — G89.29 CHRONIC RIGHT SHOULDER PAIN: Primary | ICD-10-CM

## 2023-12-18 DIAGNOSIS — M25.511 CHRONIC RIGHT SHOULDER PAIN: Primary | ICD-10-CM

## 2023-12-18 PROCEDURE — 99215 OFFICE O/P EST HI 40 MIN: CPT | Performed by: NURSE PRACTITIONER

## 2023-12-18 PROCEDURE — G0463 HOSPITAL OUTPT CLINIC VISIT: HCPCS | Performed by: NURSE PRACTITIONER

## 2023-12-18 ASSESSMENT — PAIN SCALES - GENERAL: PAINLEVEL: SEVERE PAIN (6)

## 2023-12-18 NOTE — PROGRESS NOTES
Patient presents to the clinic today for a visit with NINA Lott CNP regarding Pain Management.          11/3/2023     7:55 AM 11/17/2023     9:22 AM 12/18/2023    10:16 AM   PEG Score   PEG Total Score 6 5.33 4.33       UDS/CSA- 11.03.2023    Medications- Oxy 5mg today @6am    QUESTIONS:    Stephanie PEREZ [facility] Clinical Assistant

## 2023-12-18 NOTE — PATIENT INSTRUCTIONS
[facility] Number:  [phone]  Call with any questions about your care and for scheduling assistance.   Calls are returned Monday through Friday between 8 AM and 4:30 PM. We usually get back to you within 2 business days depending on the issue/request.    If we are prescribing your medications:  For opioid medication refills, call the clinic or send a [facility] message 7 days in advance.  Please include:  Name of requested medication  Name of the pharmacy.  For non-opioid medications, call your pharmacy directly to request a refill. Please allow 3-4 days to be processed.   Per [place] State Law:  All controlled substance prescriptions must be filled within 30 days of being written.    For those controlled substances allowing refills, pickup must occur within 30 days of last fill.      We believe regular attendance is key to your success in our program!    Any time you are unable to keep your appointment we ask that you call us at least 24 hours in advance to cancel.This will allow us to offer the appointment time to another patient.   Multiple missed appointments may lead to dismissal from the clinic.         Plan on 12/18/2023:  Medications:    No refills needed today.    Amitriptyline 75mg q hs   Celexa 20mg 1.5 tabs daily 30mg daily  Compounding medication  Cyclobenzaprine 10 mg TID PRN  Hydroxyzine 25mg 1-2 tabs QID anxiety  Zofran 8mg prn nausea with migraines  Oxycodone 5mg TID - last dose this am.    Stool softeners PRN  Imitrex 100mg PRN HA - per PCP      Interventions:  Schedule R) shoulder steroid injection with Dr. Gallegos.      Follow up:     In clinic in March.        Desi Amaya, CNP

## 2023-12-20 ENCOUNTER — TELEPHONE (OUTPATIENT)
Dept: PALLIATIVE MEDICINE | Facility: CLINIC | Age: 58
End: 2023-12-20

## 2023-12-20 DIAGNOSIS — G89.29 CHRONIC RIGHT SHOULDER PAIN: Primary | ICD-10-CM

## 2023-12-20 DIAGNOSIS — M25.511 CHRONIC RIGHT SHOULDER PAIN: Primary | ICD-10-CM

## 2023-12-20 NOTE — TELEPHONE ENCOUNTER
"Screening Questions for Radiology Injections:    Injection to be done at which interventional clinic site? [facility]    If choosing [facility] for location, please inform patient:  \"[facility] is a Hospital based clinic. Before your visit, you should check with your insurance about how it covers the charges for facility services in a hospital-based clinic.     Procedure ordered by     Procedure ordered? : R) shoulder steroid injection   Transforaminal Cervical MORGAN - Send to [facility] ([facility]) - No [facility] providers perform this procedure    What insurance would patient like us to bill for this procedure? Medicare & BC   IF SCHEDULING IN [place] PAIN OR SPINE PLEASE SCHEDULE AT LEAST 7-10 BUSINESS DAYS OUT SO A PA CAN BE OBTAINED  Worker's comp or MVA (motor vehicle accident) -Any injection DO NOT SCHEDULE and route to Gee Stoner.    [employer] insurance - For SI joint injections, DO NOT SCHEDULE and route to Luz Abbott.     ALL BCBS, [employer] and [employer] - DO NOT SCHEDULE and route to Luz Abbott  MEDICA- facet joint injections, route to Luz Abbott    Is patient scheduled at [facility]?    If YES, route every encounter to [facility] SPINE CENTER CARE NAVIGATION POOL [[phone]]    Is an  needed? No     Patient has a  home? (Review Grid) YES: Informed     Any chance of pregnancy? Not Applicable   If YES, do NOT schedule and route to RN pool  - Dr. Urbina route to Dorene Springer and PM&R Nurse  [[id number]]      Is patient actively being treated for cancer or immunocompromised? No  If YES, do NOT schedule and route to RN pool/ Dr. Urbina's Team    Does the patient have a bleeding or clotting disorder? No   If YES, okay to schedule AND route to RN nurse / Dr. Urbina's Team   (For any patients with platelet count <100, RN must forward to provider)    Is patient taking any Blood Thinners OR Antiplatelet medication?  No   If hold needed, do NOT schedule, route to RN " rebekah/ Dr. Urbina's Team  Examples:   Blood Thinners: (Coumadin, Warfarin, Jantoven, Pradaxa, Xarelto, Eliquis, Edoxaban, Enoxaparin, Lovenox, Heparin, Arixtra, Fondaparinux or Fragmin)  Antiplatelet Medications: (Plavix, Brilinta or Effient)     Is patient taking any aspirin products (includes Excedrin and Fiorinal)? No   If more than 325mg/day, OK to schedule; Instruct Pt to decrease to less than 325 mg for 7 days AND route to RN pool/ Dr. Urbina's Team   For CERVICAL procedures, hold all aspirin products for 6 days.   Tell Pt that if aspirin product is not held for 6 days, the procedure WILL BE cancelled.     Any allergies to contrast dye, iodine, shellfish, or numbing and steroid medications? No  If YES, schedule and add allergy information to appointment notes AND route to the RN pool/ Dr. Urbina's Team  If MORGAN and Contrast Dye / Iodine Allergy? DO NOT SCHEDULE, route to RN pool/ Dr. Urbina's Team  Allergies: Gluten meal, Amoxicillin-pot clavulanate, Oxycontin [oxycodone], Pregabalin, Topiramate, Acetaminophen, Dust mite extract, Gabapentin, Ketorolac, Latex, Methadone, Mold, Naprosyn [naproxen], and Seasonal allergies     Does patient have an active infection or treated for one within the past week? No  Is patient currently taking any antibiotics or steroid medications?  No   For patients on chronic, preventative, or prophylactic antibiotics, procedures may be scheduled.   For patients on antibiotics for active or recent infection, schedule 4 days after completed.  For patients on steroid medications, schedule 4 days after completed.     Has the patient had a flu shot or any other vaccinations within the past 7 days? No  If yes, explain that for the vaccine to work best they need to:     wait 1 week before and 1 week after getting any Vaccine  wait 1 week before and 2 weeks after getting any Covid Vaccine   If patient has concerns about the timing, send to RN pool/ Dr. Urbina's Team    Does patient have an  MRI/CT?  Not Applicable Include Date and Check Procedure Scheduling Grid to see if required.  Was the MRI/CT done within the last 3 years?  NA   If no route to  [place]/ Dr. Urbina's Team  If yes, where was the MRI/CT done?    Refer to PACS Transmissions list for approved external locations and route to RN Pool [place]/ Dr. Jos Team  If MRI was not done at approved external location do NOT schedule and route to RN pool/ Dr. Urbina's Team    If patient has an imaging disc, the injection MAY be scheduled but patient must bring disc to appt or appt will be cancelled.    Is patient able to transfer to a procedure table with minimal or no assistance? Yes   If no, do NOT schedule and route to  Pool/ Dr. Urbina's Team    Procedure Specific Instructions:  If celiac plexus block, informed patient NPO for 6 hours and that it is okay to take medications with sips of water, especially blood pressure medications Not Applicable       If this is for a cervical procedure, informed patient that aspirin needs to be held for 6 days.   Not Applicable    Sedation, If Sedation is ordered for any procedure, patient must be NPO for 6 hours prior to procedure Not Applicable    If IV needed:  Do not schedule procedures requiring IV placement in the first appointment of the day or first appointment after lunch. Do NOT schedule at 0745, 0815 or 1245.   Instructed patient to arrive 30 minutes early for IV start if required. (Check Procedure Scheduling Grid)  Not Applicable    Reminders:  If you are started on any steroids or antibiotics between now and your appointment, you must contact us because the procedure may need to be cancelled.  Yes    As a reminder, receiving steroids can decrease your body's ability to fight infection.   Would you still like to move forward with scheduling the injection?  Yes    IV Sedation is not provided for procedures. If oral anti-anxiety medication is needed, the patient should request this from  their referring provider.    Instruct patient to arrive as directed prior to the scheduled appointment time:  If IV needed 30 minutes before appointment time     For patients 85 or older we recommend having an adult stay w/ them for the remainder of the day.     If the patient is Diabetic, remind them to bring their glucometer.      Does the patient have any questions?  NO  Shelby Romero  [facility]

## 2024-01-03 DIAGNOSIS — R10.13 ABDOMINAL PAIN, EPIGASTRIC: ICD-10-CM

## 2024-01-03 DIAGNOSIS — R11.0 NAUSEA: ICD-10-CM

## 2024-01-03 DIAGNOSIS — C16.3 MALIGNANT NEOPLASM OF PYLORIC ANTRUM (H): ICD-10-CM

## 2024-01-03 RX ORDER — ONDANSETRON 8 MG/1
8 TABLET, FILM COATED ORAL EVERY 8 HOURS PRN
Qty: 90 TABLET | Refills: 2 | OUTPATIENT
Start: 2024-01-03

## 2024-01-03 RX ORDER — FAMOTIDINE 20 MG/1
20 TABLET, FILM COATED ORAL 2 TIMES DAILY PRN
Qty: 180 TABLET | Refills: 1 | OUTPATIENT
Start: 2024-01-03

## 2024-01-04 ENCOUNTER — THERAPY VISIT (OUTPATIENT)
Dept: PHYSICAL THERAPY | Facility: REHABILITATION | Age: 59
End: 2024-01-04
Attending: NURSE PRACTITIONER
Payer: MEDICARE

## 2024-01-04 DIAGNOSIS — M25.511 CHRONIC RIGHT SHOULDER PAIN: Primary | ICD-10-CM

## 2024-01-04 DIAGNOSIS — G89.29 CHRONIC RIGHT SHOULDER PAIN: Primary | ICD-10-CM

## 2024-01-04 PROCEDURE — 97110 THERAPEUTIC EXERCISES: CPT | Mod: GP | Performed by: PHYSICAL THERAPIST

## 2024-01-04 PROCEDURE — 97112 NEUROMUSCULAR REEDUCATION: CPT | Mod: GP | Performed by: PHYSICAL THERAPIST

## 2024-01-05 ENCOUNTER — MYC REFILL (OUTPATIENT)
Dept: ONCOLOGY | Facility: CLINIC | Age: 59
End: 2024-01-05
Payer: MEDICARE

## 2024-01-05 DIAGNOSIS — D49.0 IPMN (INTRADUCTAL PAPILLARY MUCINOUS NEOPLASM): ICD-10-CM

## 2024-01-05 DIAGNOSIS — C16.3 MALIGNANT NEOPLASM OF PYLORIC ANTRUM (H): ICD-10-CM

## 2024-01-05 DIAGNOSIS — R10.13 ABDOMINAL PAIN, EPIGASTRIC: ICD-10-CM

## 2024-01-05 RX ORDER — PANTOPRAZOLE SODIUM 40 MG/1
40 TABLET, DELAYED RELEASE ORAL 2 TIMES DAILY
Qty: 180 TABLET | Refills: 1 | Status: SHIPPED | OUTPATIENT
Start: 2024-01-05 | End: 2024-07-17

## 2024-01-09 NOTE — TELEPHONE ENCOUNTER
Procedure order placed.  Right shoulder joint steroid injection    No red flags noted at this time.

## 2024-01-11 ENCOUNTER — THERAPY VISIT (OUTPATIENT)
Dept: PHYSICAL THERAPY | Facility: REHABILITATION | Age: 59
End: 2024-01-11
Payer: MEDICARE

## 2024-01-11 DIAGNOSIS — M25.511 CHRONIC RIGHT SHOULDER PAIN: Primary | ICD-10-CM

## 2024-01-11 DIAGNOSIS — G89.29 CHRONIC RIGHT SHOULDER PAIN: Primary | ICD-10-CM

## 2024-01-11 PROCEDURE — 97112 NEUROMUSCULAR REEDUCATION: CPT | Mod: GP | Performed by: PHYSICAL THERAPIST

## 2024-01-11 PROCEDURE — 97110 THERAPEUTIC EXERCISES: CPT | Mod: GP | Performed by: PHYSICAL THERAPIST

## 2024-01-17 ENCOUNTER — TELEPHONE (OUTPATIENT)
Dept: PALLIATIVE MEDICINE | Facility: OTHER | Age: 59
End: 2024-01-17
Payer: MEDICARE

## 2024-01-17 NOTE — TELEPHONE ENCOUNTER
Preprocedure reminder call shoulder      Arrival time 8:45    Location: [facility] [address] [place] [address]    Do you have a ? Yes     If patient doesn't have a  they will need to call and reschedule    Has the patient had a flu shot or any other vaccinations within the past 7 days? No     If yes, explain that for the vaccine to work best they need to:              wait 1 week before and 1 week after getting any Vaccine           wait 1 week before and 2 weeks after getting any Covid Vaccine    If patient has concerns about the timing, send to RN pool    Have you had any oral steroids or antibiotics in the last five days? Na (Oral steroid ok per Gallegos but note the patient is taking it or not)    If yes check with the nurse or provider. The procedure will most likely need to be rescheduled.    Its ok to eat and drink as usual and take your  BP and diabetes medications if this applies to you.Na(note patient has been informed yes or no)      To call and reschedule or talk to the nurse about any questions you may have please dial    [phone]

## 2024-01-18 ENCOUNTER — RADIOLOGY INJECTION OFFICE VISIT (OUTPATIENT)
Dept: PALLIATIVE MEDICINE | Facility: OTHER | Age: 59
End: 2024-01-18
Attending: NURSE PRACTITIONER
Payer: MEDICARE

## 2024-01-18 VITALS — DIASTOLIC BLOOD PRESSURE: 66 MMHG | SYSTOLIC BLOOD PRESSURE: 119 MMHG | OXYGEN SATURATION: 98 % | HEART RATE: 85 BPM

## 2024-01-18 DIAGNOSIS — M25.511 CHRONIC RIGHT SHOULDER PAIN: ICD-10-CM

## 2024-01-18 DIAGNOSIS — G89.29 CHRONIC RIGHT SHOULDER PAIN: ICD-10-CM

## 2024-01-18 PROCEDURE — 77002 NEEDLE LOCALIZATION BY XRAY: CPT | Mod: 26 | Performed by: STUDENT IN AN ORGANIZED HEALTH CARE EDUCATION/TRAINING PROGRAM

## 2024-01-18 PROCEDURE — 255N000002 HC RX 255 OP 636: Performed by: STUDENT IN AN ORGANIZED HEALTH CARE EDUCATION/TRAINING PROGRAM

## 2024-01-18 PROCEDURE — 250N000011 HC RX IP 250 OP 636: Performed by: STUDENT IN AN ORGANIZED HEALTH CARE EDUCATION/TRAINING PROGRAM

## 2024-01-18 PROCEDURE — 20610 DRAIN/INJ JOINT/BURSA W/O US: CPT | Mod: RT | Performed by: STUDENT IN AN ORGANIZED HEALTH CARE EDUCATION/TRAINING PROGRAM

## 2024-01-18 RX ORDER — ROPIVACAINE HYDROCHLORIDE 2 MG/ML
4 INJECTION, SOLUTION EPIDURAL; INFILTRATION; PERINEURAL ONCE
Status: COMPLETED | OUTPATIENT
Start: 2024-01-18 | End: 2024-01-18

## 2024-01-18 RX ORDER — TRIAMCINOLONE ACETONIDE 40 MG/ML
40 INJECTION, SUSPENSION INTRA-ARTICULAR; INTRAMUSCULAR ONCE
Status: COMPLETED | OUTPATIENT
Start: 2024-01-18 | End: 2024-01-18

## 2024-01-18 RX ADMIN — IOHEXOL 10 ML: 180 INJECTION INTRAVENOUS at 09:30

## 2024-01-18 RX ADMIN — TRIAMCINOLONE ACETONIDE 40 MG: 40 INJECTION, SUSPENSION INTRA-ARTICULAR; INTRAMUSCULAR at 09:15

## 2024-01-18 RX ADMIN — ROPIVACAINE HYDROCHLORIDE 4 ML: 2 INJECTION EPIDURAL; INFILTRATION; PERINEURAL at 09:15

## 2024-01-18 ASSESSMENT — PAIN SCALES - GENERAL
PAINLEVEL: MODERATE PAIN (5)
PAINLEVEL: EXTREME PAIN (8)

## 2024-01-18 NOTE — PROGRESS NOTES
Pre procedure Diagnosis: Right shoulder arthropathy   Post procedure Diagnosis: Same  Procedure performed: Right glenohumeral injection  Anesthesia: Local  Complications: None  Operators: Remberto Gallegos MD    Indications:   Nathalie Bashir is a 58 year old female was sent by Desi Amaya NP for right shoulder arthropathy    Options/alternatives, benefits and risks were discussed with the patient including bleeding, infection, tissue trauma, exposure to radiation, reaction to medications including seizure, nerve injury, weakness, and numbness.  Questions were answered to her satisfaction and she agrees to proceed. Voluntary informed consent was obtained and signed.     Vitals were reviewed: Yes  Allergies were reviewed:  Yes   Medications were reviewed:  Yes   Pre-procedure pain score: 8/10    Procedure:  After obtaining informed consent, patient was brought into the procedure suite and was placed in a supine position on the procedure table.   A Pause for Cause was performed.  Patient was prepped and draped in the usual sterile fashion.    The right glenohumeral joint was identified under fluoroscopy. An AP approach was taken for the injection. A 25G 1.5inch needle was advanced under intermittent fluoroscopy until it was felt to enter the glenohumeral joint, aiming at the superomedial aspect of the humeral head.    A total of 1ml of Omnipaque-180 was injected, showing appropriate location, with spread into the intraarticular space and no intravascular uptake noted.    A mixture containing 4ml of 0.2% ropivacaine with 40mg of Kenalog was injected. The needle was removed.     Hemostasis was achieved, the area was cleaned, and bandaids were placed when appropriate.  The patient tolerated the procedure well, and was taken to the recovery room.    Images were saved to PACS.    Post-procedure pain score: 5/10  Follow-up includes:   -f/u with referring provider    Remberto Gallegos MD  Interventional Pain  Medicine  [facility]

## 2024-01-18 NOTE — PROGRESS NOTES
Pre-procedure Intake  If YES to any questions or NO to having a   Please complete laminated checklist and leave on the computer keyboard for Provider, verbally inform provider if able.    Are you taking any any blood thinners such as Coumadin, Warfarin, Jantoven, Pradaxa Xarelto, Eliquis, Edoxaban, Enoxaparin, Lovenox, Heparin, Arixtra, Fondaparinux, or Fragmin? OR Antiplatelet medication such as Plavix, Brilinta, or Effient?   No   If yes, when did you take your last dose? na    Do you take aspirin?  No  If cervical procedure, have you held aspirin for 6 days?   NA    Do you have any allergies to contrast dye, iodine, steroid and/or numbing medications?  NO    Are you currently taking antibiotics or have an active infection?  NO    Have you had a fever/elevated temperature within the past week? NO    Are you currently taking oral steroids? NO    Do you have a ? Yes    Are you pregnant or breastfeeding?  NO    Have you received the COVID-19 vaccine? Yes  If yes, was it your 1st, 2nd or only dose needed? 6th  Date of most recent vaccine: 11.30.2023    Notify provider and RNs if systolic BP >170, diastolic BP >100, P >100 or O2 sats < 90%

## 2024-01-18 NOTE — PATIENT INSTRUCTIONS
[facility]   Procedure Discharge Instructions    Today you saw:    Dr. Kim,   Dr. Daniel Gallegos    You had an:  Epidural steroid injection   sacroiliac joint injection   facet joint injection  hip injection  piriformis injection     Medications used:  Lidocaine Omnipaque  Ropivicaine   Kenalog             If you were holding your blood thinning medication, please restart taking it: N/A  Be cautious when walking. Numbness and/or weakness in the lower extremities may occur for up to 6-8 hours after the procedure due to effect of the local anesthetic  Do not drive for 6 hours. The effect of the local anesthetic could slow your reflexes.   You may resume your regular activities after 24 hours  Avoid strenuous activity for the first 24 hours  You may shower, however avoid swimming, tub baths or hot tubs for 24 hours following your procedure  You may have a mild to moderate increase in pain for several days following the injection.  It may take up to 14 days for the steroid medication to start working although you may feel the effect as early as a few days after the procedure.     You may use ice packs for 10-15 minutes, 3 to 4 times a day at the injection site for comfort  Do not use heat to painful areas for 6 to 8 hours. This will give the local anesthetic time to wear off and prevent you from accidentally burning your skin.   Unless you have been directed to avoid the use of anti-inflammatory medications (NSAIDS), you may use medications such as ibuprofen, Aleve or Tylenol for pain control if needed.   If you were fasting, you may resume your normal diet and medications after the procedure  If you have diabetes, check your blood sugar more frequently than usual as your blood sugar may be higher than normal for 10-14 days following a steroid injection. Contact your doctor who manages your diabetes if your blood sugar is higher than usual  Possible side effects of  steroids that you may experience include flushing, elevated blood pressure, increased appetite, mild headaches and restlessness.  All of these symptoms will get better with time.  If you experience any of the following, call the Pain Clinic at [phone]:  -Fever over 100 degree F  -Swelling, bleeding, redness, drainage, warmth at the injection site  -Progressive weakness or numbness in your legs or arms  -Loss of bowel or bladder function  -Unusual headache that is not relieved by Tylenol or other pain reliever  -Unusual new onset of pain that is not improving

## 2024-02-01 ENCOUNTER — TELEPHONE (OUTPATIENT)
Dept: PALLIATIVE MEDICINE | Facility: OTHER | Age: 59
End: 2024-02-01
Payer: MEDICARE

## 2024-02-01 NOTE — TELEPHONE ENCOUNTER
Received fax request from [facility] requesting refill(s) for amitriptyline (ELAVIL) 75 MG tablet     Patient has refills available. Encounter closed.

## 2024-02-29 ENCOUNTER — MYC REFILL (OUTPATIENT)
Dept: PALLIATIVE MEDICINE | Facility: OTHER | Age: 59
End: 2024-02-29
Payer: MEDICARE

## 2024-02-29 DIAGNOSIS — G89.3 CHRONIC PAIN DUE TO NEOPLASM: ICD-10-CM

## 2024-02-29 DIAGNOSIS — M79.2 NEUROPATHIC PAIN: ICD-10-CM

## 2024-02-29 RX ORDER — OXYCODONE HYDROCHLORIDE 5 MG/1
5 TABLET ORAL 3 TIMES DAILY PRN
Qty: 90 TABLET | Refills: 0 | Status: SHIPPED | OUTPATIENT
Start: 2024-02-29 | End: 2024-04-02

## 2024-02-29 NOTE — TELEPHONE ENCOUNTER
CSA has been reviewed and agreed upon through [place] and a new copy will be sent to the patient and she will sign an updated copy at the next apt.

## 2024-02-29 NOTE — TELEPHONE ENCOUNTER
Pending Prescriptions:                       Disp   Refills    oxyCODONE (ROXICODONE) 5 MG tablet        90 tab*0            Sig: Take 1 tablet (5 mg) by mouth 3 times daily as           needed for breakthrough pain or moderate to           severe pain Maximum 3 pills/day. Dispense 3/8/24           for start 3/10/24    Last apt: 12/18/24  Next apt: 3/15/24    Needs to obtain a CSA  My chart message sent to patient offering all options to complete this and awaiting response from patient

## 2024-02-29 NOTE — TELEPHONE ENCOUNTER
Received call from patient requesting refill(s) of     OXYCODONE HCL 5 MG PO TABS  Last dispensed from pharmacy on:     Patient's last office/virtual visit by prescribing provider on: None on file    Next office/virtual appointment scheduled for: None on file     UDT: NOV. 3, 2023  CSA: None on file     E-prescribe to      [facility] [id number] [place] [id number] [address]         Will route to nursing [place] for review and preparation of prescription(s).       Ce Roque, Clinic Facilitator  [facility] [place]

## 2024-03-01 NOTE — TELEPHONE ENCOUNTER
I am signing this on behalf of my colleague Desi Amaya CNP who is out of the office. Please see her/his previous documentation regarding the appropriateness of these prescriptions. I have reviewed the most recent visit notes.  reviewed for all controlled substance refills. If any concerns are found, it will be noted.     Script Eprescribed to pharmacy    Will send this to MA team to notify patient.    Signed Prescriptions:                        Disp   Refills    oxyCODONE (ROXICODONE) 5 MG tablet         90 tab*0        Sig: Take 1 tablet (5 mg) by mouth 3 times daily as needed           for breakthrough pain or moderate to severe pain           Maximum 3 pills/day. Dispense 3/8/24 for start           3/10/24  Authorizing Provider: STACY CASTAÑEDA APRN, NP-C  [facility]

## 2024-03-07 ENCOUNTER — THERAPY VISIT (OUTPATIENT)
Dept: PHYSICAL THERAPY | Facility: REHABILITATION | Age: 59
End: 2024-03-07
Payer: MEDICARE

## 2024-03-07 DIAGNOSIS — M25.511 CHRONIC RIGHT SHOULDER PAIN: Primary | ICD-10-CM

## 2024-03-07 DIAGNOSIS — G89.29 CHRONIC RIGHT SHOULDER PAIN: Primary | ICD-10-CM

## 2024-03-07 PROCEDURE — 97110 THERAPEUTIC EXERCISES: CPT | Mod: GP | Performed by: PHYSICAL THERAPIST

## 2024-03-14 NOTE — PROGRESS NOTES
[facility]    CHIEF COMPLAINT: Chronic Pain.    INTERVAL HISTORY:  Last seen on 12/18/23. Patient of PEDRO Mary who is out of the clinic at this time.       Recommendations/plan at the last visit included:  Plan on 12/18/2023:  Medications:     No refills needed today.  Amitriptyline 75mg q hs   Celexa 20mg 1.5 tabs daily 30mg daily  Compounding medication  Cyclobenzaprine 10 mg TID PRN  Hydroxyzine 25mg 1-2 tabs QID anxiety  Zofran 8mg prn nausea with migraines  Oxycodone 5mg TID - last dose this am.    Stool softeners PRN  Imitrex 100mg PRN HA - per PCP  Interventions:  Schedule R) shoulder steroid injection with Dr. Gallegos.    Since last visit:   - Hx of pancreatic cancer with whipple 1/2020. Saw Palliative Care through 2021. Had new patient visit with Ms Amaya on 11/3/2023 for neuropathic pain and right shoulder pain.   - 1/18/24: Had right shoulder injection with Dr Gallegos: approx 65% improvement, also going to PT which is helpful   - Also has right ankle pain r/t OA.   - Chemotherapy induced neuropathy: did not notice any improvement     Pain Information today: Severe Pain (6)/10. Location of pain: right ankle, right shoulder.    Annual requirements last collected:  11/3/2023    CURRENT RELEVANT PAIN MEDICATIONS:   Amitriptyline 75mg q hs   Celexa 20mg 1.5 tabs daily 30mg daily  Compounding medication  Cyclobenzaprine 10 mg TID PRN  Hydroxyzine 25mg 1-2 tabs QID anxiety  Zofran 8mg prn nausea with migraines   Stool softeners PRN  Imitrex 100mg PRN HA - per PCP    Oxycodone 5mg TID - 22.5 MME/day     [place] Board of Pharmacy Data Base Reviewed:    YES; No concern for abuse or misuse of controlled medications based on this report. Reviewed [facility] March 14, 2024- no concerning fills.      PHYSICAL EXAM    Vitals:    03/15/24 1358   BP: 125/68   Pulse: 97       Constitutional: healthy, alert, and no distress A&O.   Patient is appropriate.  Psychiatric/mental status: Alert,  without lethargy or stupor. Appropriate affect.     MUSCULOSKELETAL: Physical exam deferred.       PAIN RELEVANT CONDITIONS DISCUSSED TODAY:   1.  Chemotherapy induced neuropathy  2.  Multiple joint pain secondary to arthritis    DIAGNOSIS AND PLAN:     (G62.0,  T45.1X5A) Chemotherapy-induced peripheral neuropathy  (H24)  (primary encounter diagnosis)  (M15.9) Primary osteoarthritis involving multiple joints  Comment: I discussed options for treating neuropathy as well as osteoarthritis with the use of a compounded topical cream containing ketamine and ketoprofen.  I have ordered 1 bottle to be made out for her.  She will need to discuss ongoing use with her regular pain provider, Desi WOOD when she returns to clinic.  Plan: ketamine HCl POWD               PATIENT INSTRUCTIONS:     Diagnosis reviewed, treatment option addressed, and risk/benefits discussed.  Self-care instructions given.  I am recommending a multidisciplinary treatment plan to help this patient better manage pain.    Remember to request ALL medication refills 5 BUSINESS days before you run out.     30 minutes Clinic follow-up with Desi Amaya NP-C in 2 months   Medication Management :   Topical compounded medication containing Ketamine 8% and Gabapentin 8% ordered, 1 bottle ordered.  Other options for improved pain control:   talk with Desi about an opiate rotation to Dilaudid 2 mg x 3 tabs daily = 24 MME, or other opiate rotation option if you think that the oxycodone is not effective enough.   Consider Lyrica at a slow titration to a therapeutic dose. Starting with 50 mg at bedtime working up to 100  mg twice per day as needed. We discussed that side effects are better tolerated when you start with a low dose and increase slowly.     I have reviewed the note as documented above.  This accurately captures the substance of my conversation with the patient.  A total of 27 minutes of preparation, care, and consultation were spent on this visit  today.     NINA Sinclair, NP-C  [facility]    (Information in italics and blue color are taken from previous pain and consulting medical providers notes and are documented as such)

## 2024-03-15 ENCOUNTER — OFFICE VISIT (OUTPATIENT)
Dept: PALLIATIVE MEDICINE | Facility: OTHER | Age: 59
End: 2024-03-15
Attending: NURSE PRACTITIONER
Payer: MEDICARE

## 2024-03-15 VITALS — DIASTOLIC BLOOD PRESSURE: 68 MMHG | HEART RATE: 97 BPM | SYSTOLIC BLOOD PRESSURE: 125 MMHG

## 2024-03-15 DIAGNOSIS — M15.0 PRIMARY OSTEOARTHRITIS INVOLVING MULTIPLE JOINTS: ICD-10-CM

## 2024-03-15 DIAGNOSIS — G62.0 CHEMOTHERAPY-INDUCED PERIPHERAL NEUROPATHY (H): Primary | ICD-10-CM

## 2024-03-15 DIAGNOSIS — T45.1X5A CHEMOTHERAPY-INDUCED PERIPHERAL NEUROPATHY (H): Primary | ICD-10-CM

## 2024-03-15 PROCEDURE — 99214 OFFICE O/P EST MOD 30 MIN: CPT | Performed by: NURSE PRACTITIONER

## 2024-03-15 PROCEDURE — G0463 HOSPITAL OUTPT CLINIC VISIT: HCPCS | Performed by: NURSE PRACTITIONER

## 2024-03-15 PROCEDURE — G2211 COMPLEX E/M VISIT ADD ON: HCPCS | Performed by: NURSE PRACTITIONER

## 2024-03-15 RX ORDER — KETAMINE HCL 100 %
POWDER (GRAM) MISCELLANEOUS
Qty: 60 G | Refills: 2 | Status: SHIPPED | OUTPATIENT
Start: 2024-03-15 | End: 2024-06-24

## 2024-03-15 ASSESSMENT — PAIN SCALES - GENERAL: PAINLEVEL: SEVERE PAIN (6)

## 2024-03-15 NOTE — PATIENT INSTRUCTIONS
After Visit Instructions:     Thank you for coming to [facility] for your care. It is my goal to partner with you to help you reach your optimal state of health.   Continue daily self-care, identifying contributing factors, and monitoring variations in pain level. Continue to integrate self-care into your life.      30 minutes Clinic follow-up with PEDRO Mary in 2 months   Medication Management :   Topical compounded medication containing Ketamine 8% and Gabapentin 8% ordered, 1 bottle ordered.  Other options for improved pain control:   talk with Desi about an opiate rotation to Dilaudid 2 mg x 3 tabs daily = 24 MME, or other opiate rotation option if you think that the oxycodone is not effective enough.   Consider Lyrica at a slow titration to a therapeutic dose. Starting with 50 mg at bedtime working up to 100  mg twice per day as needed. We discussed that side effects are better tolerated when you start with a low dose and increase slowly.       NINA Sinclair, NP-C  [facility]  [facility] - Monday, Thursday and Friday  [facility] - Tuesday      Be sure to request ALL medication refills 5 days prior to the due date whether or not you will see your medical provider in an appointment before the due date.      Do not expect same day refills. If you do not plan ahead you may run out of medications.     Early refills are not provided.  It is your responsibility to manage your medications responsibility and keep them safely stored. Lost or destroyed medications WILL NOT be replaced    Scheduling/Clinic telephone Number for ALL locations:  [phone]    After Hours On-Call Service:  [phone]    Call with any questions about your care and for scheduling assistance.   Calls are returned Monday through Friday between 8 AM and 4:00 PM. We usually get back to you within 2 business days depending on the issue/request.    If we are prescribing your  medications:  For opioid medication refills, call the clinic or send a Intertwinehart message 7 days in advance.  Please include:  Your name and date of birth.   Name of requested medication  Name of the pharmacy.  For non-opioid medications, call your pharmacy directly to request a refill. Please allow 3-4 days to be processed.   Per [place] State Law:  All controlled substance prescriptions must be filled within 30 days of being written.    For those controlled substances allowing refills, pickup must occur within 30 days of last fill.      We believe regular attendance is key to your success in our program!    Any time you are unable to keep your appointment we ask that you call us at least 24 hours in advance to cancel.This will allow us to offer the appointment time to another patient.   Multiple missed appointments may lead to dismissal from the clinic.

## 2024-03-26 DIAGNOSIS — M79.2 NEUROPATHIC PAIN: ICD-10-CM

## 2024-03-26 NOTE — TELEPHONE ENCOUNTER
Received fax from pharmacy requesting refill(s) for     amitriptyline (ELAVIL) 75 MG tablet    Date last filled 01/03/2024    Last Appt Date:03/15/2024    Next Appt scheduled: None    Pharmacy:   [facility] [id number] [place] [facility] [address], [place] [id number] [place] route for processing    Dorene Benjamin MA  [facility] [place]

## 2024-03-28 RX ORDER — AMITRIPTYLINE HYDROCHLORIDE 75 MG/1
75 TABLET ORAL AT BEDTIME
Qty: 30 TABLET | Refills: 3 | Status: SHIPPED | OUTPATIENT
Start: 2024-03-28 | End: 2024-07-29

## 2024-04-01 ENCOUNTER — HOSPITAL ENCOUNTER (EMERGENCY)
Facility: HOSPITAL | Age: 59
Discharge: HOME | End: 2024-04-01
Attending: EMERGENCY MEDICINE | Admitting: EMERGENCY MEDICINE
Payer: MEDICARE

## 2024-04-01 VITALS
DIASTOLIC BLOOD PRESSURE: 51 MMHG | OXYGEN SATURATION: 98 % | RESPIRATION RATE: 16 BRPM | TEMPERATURE: 98.1 F | HEART RATE: 84 BPM | SYSTOLIC BLOOD PRESSURE: 96 MMHG | BODY MASS INDEX: 25.47 KG/M2 | WEIGHT: 170 LBS

## 2024-04-01 DIAGNOSIS — R51.9 ACUTE NONINTRACTABLE HEADACHE, UNSPECIFIED HEADACHE TYPE: ICD-10-CM

## 2024-04-01 PROCEDURE — 250N000011 HC RX IP 250 OP 636: Mod: JZ | Performed by: EMERGENCY MEDICINE

## 2024-04-01 PROCEDURE — 96374 THER/PROPH/DIAG INJ IV PUSH: CPT

## 2024-04-01 PROCEDURE — 99284 EMERGENCY DEPT VISIT MOD MDM: CPT | Mod: 25

## 2024-04-01 RX ORDER — DROPERIDOL 2.5 MG/ML
0.62 INJECTION, SOLUTION INTRAMUSCULAR; INTRAVENOUS ONCE
Status: COMPLETED | OUTPATIENT
Start: 2024-04-01 | End: 2024-04-01

## 2024-04-01 RX ADMIN — DROPERIDOL 0.62 MG: 2.5 INJECTION, SOLUTION INTRAMUSCULAR; INTRAVENOUS at 19:57

## 2024-04-01 ASSESSMENT — COLUMBIA-SUICIDE SEVERITY RATING SCALE - C-SSRS
1. IN THE PAST MONTH, HAVE YOU WISHED YOU WERE DEAD OR WISHED YOU COULD GO TO SLEEP AND NOT WAKE UP?: NO
6. HAVE YOU EVER DONE ANYTHING, STARTED TO DO ANYTHING, OR PREPARED TO DO ANYTHING TO END YOUR LIFE?: NO
2. HAVE YOU ACTUALLY HAD ANY THOUGHTS OF KILLING YOURSELF IN THE PAST MONTH?: NO

## 2024-04-01 ASSESSMENT — ACTIVITIES OF DAILY LIVING (ADL)
ADLS_ACUITY_SCORE: 35
ADLS_ACUITY_SCORE: 37
ADLS_ACUITY_SCORE: 37

## 2024-04-01 NOTE — ED TRIAGE NOTES
Patient arrives by private car for evaluation of migraine that started about about 0300.  Took Imitrex at about 0330 and 0830 without relief.

## 2024-04-02 ENCOUNTER — MYC REFILL (OUTPATIENT)
Dept: PALLIATIVE MEDICINE | Facility: OTHER | Age: 59
End: 2024-04-02
Payer: MEDICARE

## 2024-04-02 DIAGNOSIS — M79.2 NEUROPATHIC PAIN: ICD-10-CM

## 2024-04-02 DIAGNOSIS — G89.3 CHRONIC PAIN DUE TO NEOPLASM: ICD-10-CM

## 2024-04-02 RX ORDER — OXYCODONE HYDROCHLORIDE 5 MG/1
5 TABLET ORAL 3 TIMES DAILY PRN
Qty: 90 TABLET | Refills: 0 | Status: SHIPPED | OUTPATIENT
Start: 2024-04-02 | End: 2024-04-30

## 2024-04-02 NOTE — TELEPHONE ENCOUNTER
4/2/2024 1:23 PM     REFILL REQUEST:  I am signing this on behalf of my colleague NINA Hughes, CNP  who is out of the office. Please see their previous documentation regarding the appropriateness of these prescriptions. Chart reviewed - Request appears appropriate. PDMP reviewed for all controlled substance refills. If any concerns are found, it will be noted.     Refilled for 30 day supply.  Script e-Prescribed to pharmacy    NINA Trejo, DNP, FNP-C

## 2024-04-02 NOTE — TELEPHONE ENCOUNTER
Received call from patient requesting refill(s) oxyCODONE (ROXICODONE) 5 MG tablet    Last dispensed from pharmacy on 03/08/2024    Patient's last office/virtual visit by prescribing provider on 11/03/2023  Next office/virtual appointment scheduled for 11/06/2023    Last urine drug screen date 11/03/2023  Current opioid agreement on file (completed within the last year) Yes Date of opioid agreement: 11/03/2023    E-prescribe to  [place] [address] [place] [address]      Will route to nursing [place] for review and preparation of prescription(s).     Dorene Benjamin MA  [facility] [place] [facility] [place]

## 2024-04-02 NOTE — ED NOTES
Pt states after medication administration, the intensity of her headache went from an 8/10 down to a 5/10 and feels like its easing.

## 2024-04-02 NOTE — ED PROVIDER NOTES
EMERGENCY DEPARTMENT ENCOUNTER            IMPRESSION:  Headache        MEDICAL DECISION MAKING:  It was my pleasure to provide care for Nathalie Bashir who presented for evaluation of headache.  Patient has a history of migraine headaches of exactly similar in nature    On my exam patient is pleasant and cooperative.   Vital signs show slightly low blood pressure.  Physical exam notable for normal neurologic exam.     Droperidol administered for symptom relief.  Patient's symptoms improved.     ED evaluation is consistent with benign migraine headache.      Patient was reevaluated and results were discussed.  I recommended follow-up with outpatient neurology      Prior to making a final disposition on this patient the results of patient's tests and other diagnostic studies were discussed with the patient. All questions were answered. Patient expressed understanding of the plan and was amenable.    Return precautions and follow-up were discussed.     =================================================================  CHIEF COMPLAINT:  Chief Complaint   Patient presents with    Headache         HPI  Nathalie Bashir is a 58 year old female with a history of migraines with aura, gastric adenocarcinoma  who presents to the ED dropped off by  for evaluation of headache.    Patient reports a Hx of migraines and says that she is having headache that started at 0300 and has been persistent since. She also endorses nausea and vomiting. She has taken Imitrex and zofran with no relief. She has not been able to eat much because she can't keep anything down.         REVIEW OF SYSTEMS  Constitutional: Does not report chills, unintentional weight loss or fatigue   Eyes: Does not report visual changes or discharge    HENT: Does not report sore throat, ear pain or neck pain  Respiratory: Does not report cough or shortness of breath    Cardiovascular: Does not report chest pain, palpitations or leg swelling  GI: Does not report  abdominal pain, or dark, bloody stools.  Reports nausea and vomiting.   : Does not report hematuria, dysuria, or flank pain  Musculoskeletal: Does not report any new musculoskeletal pain or new muscle/joint pains  Skin: Does not report rash or wound  Neurologic: Does not report new weakness, focal weakness, or sensory changes. Reports a headache.       Remainder of systems reviewed, unless noted in HPI all others negative.      PAST MEDICAL HISTORY:  Past Medical History:   Diagnosis Date    Allergic rhinitis     Cancer (H)     stomach cancer    Chronic pain     Closed fracture of distal end of left radius     Depression     Gastric adenocarcinoma (H)     Lumbago     Migraine     Opioid dependence (H)     Other chronic pain     low back    Sacroiliitis (H24)     low back pain    Trochanteric bursitis     leg length discrrepancy, hip pain       PAST SURGICAL HISTORY:  Past Surgical History:   Procedure Laterality Date    ARTHROPLASTY HIP Left 2016    BACK SURGERY      L4-5 decompression    CATARACT IOL, RT/LT      CHOLECYSTECTOMY N/A 1/28/2020    Procedure: Cholecystectomy;  Surgeon: Yandel Mallory MD;  Location:  OR    ENDOSCOPIC RETROGRADE CHOLANGIOPANCREATOGRAM N/A 7/27/2020    Procedure: ENDOSCOPIC RETROGRADE CHOLANGIOPANCREATOGRAPHY  **Latex Allergy** with jejunal biopsies;  Surgeon: Jeet Aviles MD;  Location:  OR    ESOPHAGOSCOPY, GASTROSCOPY, DUODENOSCOPY (EGD), COMBINED N/A 3/3/2020    Procedure: ESOPHAGOGASTRODUODENOSCOPY, WITH ENDOSCOPIC US (enoxaparin);  Surgeon: Bakari Plata MD;  Location:  GI    ESOPHAGOSCOPY, GASTROSCOPY, DUODENOSCOPY (EGD), COMBINED N/A 12/7/2020    Procedure: Upper Endoscopy with stent placement;  Surgeon: Jeet Aviles MD;  Location:  OR    ESOPHAGOSCOPY, GASTROSCOPY, DUODENOSCOPY (EGD), COMBINED N/A 2/8/2021    Procedure: ESOPHAGOGASTRODUODENOSCOPY (EGD)AND STENT REMOVAL;  Surgeon: Jeet Aviles MD;  Location:  OR    EYE SURGERY       GASTRECTOMY N/A 10/21/2020    Procedure: Open subtotal gastectomy with David en Y Reconstruction; D1 Lymph Node Disection  **Latex Allergy**;  Surgeon: Yandel Mallory MD;  Location: [facility] OR    INJECT BLOCK MEDIAL BRANCH CERVICAL/THORACIC/LUMBAR Bilateral 9/27/2023    Procedure: BILATERAL L4-5, L5-S1 medial branch block #1;  Surgeon: Maria Victoria Urbina MD;  Location: [facility] OR    IR CHEST PORT PLACEMENT > 5 YRS OF AGE  3/26/2020    IR LUMBAR EPIDURAL STEROID INJECTION  8/25/2009    IR LUMBAR EPIDURAL STEROID INJECTION  9/2/2009    IR LUMBAR EPIDURAL STEROID INJECTION  9/25/2009    IR LUMBAR EPIDURAL STEROID INJECTION  11/23/2009    IR LUMBAR EPIDURAL STEROID INJECTION  4/1/2010    IR LUMBAR EPIDURAL STEROID INJECTION  9/1/2010    IR LUMBAR EPIDURAL STEROID INJECTION  3/10/2011    IR LUMBAR EPIDURAL STEROID INJECTION  8/30/2011    IR PORT REMOVAL RIGHT  12/8/2023    LAPAROSCOPY DIAGNOSTIC (GENERAL) N/A 10/13/2020    Procedure: Diagnostic laparoscopy with peritoneal washings  **Latex Allergy**;  Surgeon: Yandel Mallory MD;  Location: [facility] OR    OPEN REDUCTION INTERNAL FIXATION RODDING INTRAMEDULLARY FEMUR Left 12/23/2014    Procedure: OPEN REDUCTION INTERNAL FIXATION RODDING INTRAMEDULLARY FEMUR;  Surgeon: Arnol Santiago MD;  Location: [facility] OR    OPEN REDUCTION INTERNAL FIXATION WRIST Left 9/22/2022    Procedure: LEFT WRIST OPEN REDUCTION INTERNAL FIXATION, FRACTURE;  Surgeon: Mark Villalobos MD;  Location: [facility] OR    ORTHOPEDIC SURGERY      PICC DOUBLE LUMEN PLACEMENT Right 02/07/2020    5 fr double lumen 41 cm    UT LAMINEC/FACETECT/FORAMIN,LUMBAR 1 SEG      Description: Laminectomy Decompress, Facetectomy, Foraminotomy Lumbar Seg;  Recorded: 04/12/2012;    REMOVE HARDWARE LOWER EXTREMITY Left 4/7/2015    Procedure: REMOVE HARDWARE LOWER EXTREMITY;  Surgeon: Arnol Santiago MD;  [facility] OR    REMOVE HARDWARE RODDING INTRAMEDULLARY FEMUR Left 12/17/2015    Procedure: REMOVE HARDWARE RODDING INTRAMEDULLARY FEMUR;   Surgeon: Anrol Santiago MD;  [facility]    RESECT BONE LOWER EXTREMITY Left 12/23/2014    Procedure: RESECT BONE LOWER EXTREMITY;  Surgeon: Arnol Santiago MD;  [facility]    SHORTENING OSTEOPLASTY FEMUR W/ IM NAILING      WHIPPLE PROCEDURE N/A 1/28/2020    Procedure: Open Whipple procedure and Cholecystectomy;  Surgeon: Yandel Mallory MD;  Location: [facility] OR    WHIPPLE PROCEDURE      ZZC FUSION OF SACROILIAC JOINT      Description: Arthrodesis Sacroiliac Joint Left;  Recorded: 10/07/2011;    ZZC REPAIR DETACH RETINA,SCLERAL BUCKLE           CURRENT MEDICATIONS:    amitriptyline (ELAVIL) 75 MG tablet  citalopram (CELEXA) 20 MG tablet  COMPOUNDED NON-CONTROLLED SUBSTANCE (CMPD RX) - PHARMACY TO MIX COMPOUNDED MEDICATION  Cyanocobalamin 1000 MCG CAPS  cyclobenzaprine (FLEXERIL) 10 MG tablet  famotidine (PEPCID) 20 MG tablet  fluticasone (FLONASE) 50 MCG/ACT nasal spray  hydrOXYzine (VISTARIL) 25 MG capsule  ketamine HCl POWD  loperamide (IMODIUM) 2 MG capsule  loratadine-pseudoePHEDrine (CLARITIN-D 24-HOUR)  MG 24 hr tablet  ondansetron (ZOFRAN) 8 MG tablet  oxyCODONE (ROXICODONE) 5 MG tablet  pantoprazole (PROTONIX) 40 MG EC tablet  sennosides (SENOKOT) 8.6 MG tablet  simethicone 80 MG TABS  SUMAtriptan (IMITREX) 100 MG tablet        ALLERGIES:  Allergies   Allergen Reactions    Gluten Meal Palpitations    Amoxicillin-Pot Clavulanate Rash     Patient tolerated Zosyn in 7/2020    Oxycontin [Oxycodone] Other (See Comments)     Confusion, loopy, oxycodone is tolerated.    Pt states she tolerates regular oxycodone, cannot take 12 hour oxycontin.      Pregabalin      interfered with Cymbalta    Topiramate      Tongue numbness, taste alteration, irritable     Acetaminophen Nausea     Urine retention      Dust Mite Extract     Gabapentin Dizziness and GI Disturbance    Ketorolac Headache    Latex Rash    Methadone Fatigue    Mold     Naprosyn [Naproxen] Dizziness and GI Disturbance    Seasonal Allergies         FAMILY HISTORY:  Family History   Problem Relation Age of Onset    Heart Failure Mother 77    Dementia Father         frontal lobe    No Known Problems Sister     No Known Problems Brother     Anesthesia Reaction No family hx of     Deep Vein Thrombosis No family hx of        SOCIAL HISTORY:   Social History     Socioeconomic History    Marital status:     Number of children: 1   Occupational History    Occupation: retired   Tobacco Use    Smoking status: Former     Packs/day: 0.50     Years: 25.00     Additional pack years: 0.00     Total pack years: 12.50     Types: Cigarettes     Quit date: 2020     Years since quittin.2     Passive exposure: Past    Smokeless tobacco: Never   Substance and Sexual Activity    Alcohol use: No    Drug use: No       PHYSICAL EXAM:    BP 96/51   Pulse 84   Temp 98.1  F (36.7  C) (Oral)   Resp 16   Wt 77.1 kg (170 lb)   LMP 2014   SpO2 98%   BMI 25.47 kg/m        Constitutional: Awake, alert, no distress  Head: Normocephalic, atraumatic.  ENT: Mucous membranes are moist.  No pallor.   Eyes: Pupils are reactive.  No discoloration.  Neck: No lymphadenopathy, no stridor, supple, no soft tissue swelling  Chest: No tenderness   Respiratory: Respirations even, unlabored. Lungs clear to ascultation bilaterally, in no acute respiratory distress.  Cardiovascular: Regular rate and rhythm.  Good overall perfusion.  Upper and lower extremity pulses are equal.  GI: Abdomen soft, non-tender to palpation.  No guarding or rebound. Bowel sounds present throughout.   Back: No CVA tenderness.    Musculoskeletal: Moves all 4 extremities equally, full function and capacity no peripheral edema.   Integument: Warm, dry. No rash. No bruising or petechiae.  Neurologic: Alert & oriented x 3. Normal speech. Grossly normal motor and sensory function. No focal deficits noted.  NIHSS = 0  Psychiatric: Normal mood and affect.  Appropriate judgement.    ED COURSE:  7:06 PM I  introduced myself to the patient, obtained patient history, performed a physical exam, and discussed plan for ED workup including potential diagnostic laboratory/imaging studies and interventions.  9:00 PM Rechecked and updated the patient. We discussed the plan for discharge and the patient is agreeable. Reviewed supportive cares, symptomatic treatment, outpatient follow up, and reasons to return to the Emergency Department. Patient to be discharged by ED RN.           Medical Decision Making    History:  Supplemental history from: NA  External Record(s) reviewed: External medical records including care everywhere reviewed 3/15/2024 office visit     Work Up:  EKG, laboratory and imaging studies as ordered were independently interpreted by myself.   Broad differential diagnosis considered for headache  The patient's presentation was of moderate complexity.       Complicating factors:  Patient has a complicated past medical history including migraines with aura, gastric adenocarcinoma   Care affected by social determinants of health: Access to primary care    Disposition involved shared decision-making with the patient.          EKG:    ECG results from 09/18/22   EKG 12-lead, tracing only     Value    Systolic Blood Pressure     Diastolic Blood Pressure     Ventricular Rate 89    Atrial Rate 89    NM Interval 142    QRS Duration 76        QTc 474    P Axis 36    R AXIS 30    T Axis 24    Interpretation ECG      Sinus rhythm with sinus arrhythmia  Septal infarct , age undetermined  Abnormal ECG  Unconfirmed report - interpretation of this ECG is computer generated - see medical record for final interpretation  Confirmed by - EMERGENCY ROOM, PHYSICIAN ([id number]),  ARMANDO RIVERA ([id number]) on 9/19/2022 2:37:10 PM       *Note: Due to a large number of results and/or encounters for the requested time period, some results have not been displayed. A complete set of results can be found in Results Review.       I  have independently reviewed and interpreted the EKG(s) documented above.        MEDICATIONS GIVEN IN THE EMERGENCY:  Medications   droPERidol (INAPSINE) injection 0.625 mg (0.625 mg Intravenous $Given 4/1/24 1957)           NEW PRESCRIPTIONS STARTED AT TODAY'S ER VISIT:  Discharge Medication List as of 4/1/2024  9:05 PM                   FINAL DIAGNOSIS:    ICD-10-CM    1. Acute nonintractable headache, unspecified headache type  R51.9                  NAME: Nathalie Bashir  AGE: 58 year old female  YOB: 1965  MRN: [id number]  EVALUATION DATE & TIME: 4/1/2024  6:36 PM    PCP: Alban Yuan    ED PROVIDER: Tam Seth M.D.      I, Dean Pryor, am serving as a scribe to document services personally performed by Dr. Tam Seth based on my observation and the provider's statements to me. I, Tam Seth MD attest that Dean Pryor is acting in a scribe capacity, has observed my performance of the services and has documented them in accordance with my direction.    Tam Seth M.D.  Emergency Medicine  [facility] EMERGENCY DEPARTMENT  [facility] [facility] [phone]  Dept: [phone]  4/1/2024         Tam Seth MD  04/02/24 0059

## 2024-04-02 NOTE — TELEPHONE ENCOUNTER
Medication refill information reviewed.     Due date for oxyCODONE (ROXICODONE) 5 MG tablet is 04/09/24     Prescriptions prepped for review.     Will route to provider.

## 2024-04-09 ENCOUNTER — TELEPHONE (OUTPATIENT)
Dept: UROLOGY | Facility: CLINIC | Age: 59
End: 2024-04-09

## 2024-04-09 ENCOUNTER — LAB (OUTPATIENT)
Dept: LAB | Facility: CLINIC | Age: 59
End: 2024-04-09
Payer: MEDICARE

## 2024-04-09 DIAGNOSIS — N39.0 UTI (URINARY TRACT INFECTION): ICD-10-CM

## 2024-04-09 DIAGNOSIS — N39.0 UTI (URINARY TRACT INFECTION): Primary | ICD-10-CM

## 2024-04-09 LAB
ALBUMIN UR-MCNC: NEGATIVE MG/DL
APPEARANCE UR: ABNORMAL
BACTERIA #/AREA URNS HPF: ABNORMAL /HPF
BILIRUB UR QL STRIP: NEGATIVE
COLOR UR AUTO: YELLOW
GLUCOSE UR STRIP-MCNC: NEGATIVE MG/DL
HGB UR QL STRIP: ABNORMAL
KETONES UR STRIP-MCNC: NEGATIVE MG/DL
LEUKOCYTE ESTERASE UR QL STRIP: ABNORMAL
MUCOUS THREADS #/AREA URNS LPF: PRESENT /LPF
NITRATE UR QL: NEGATIVE
PH UR STRIP: 6 [PH] (ref 5–7)
RBC #/AREA URNS AUTO: ABNORMAL /HPF
SP GR UR STRIP: 1.01 (ref 1–1.03)
SQUAMOUS #/AREA URNS AUTO: ABNORMAL /LPF
TRANS CELLS #/AREA URNS HPF: ABNORMAL /HPF
UROBILINOGEN UR STRIP-ACNC: 0.2 E.U./DL
WBC #/AREA URNS AUTO: ABNORMAL /HPF
WBC CLUMPS #/AREA URNS HPF: PRESENT /HPF

## 2024-04-09 PROCEDURE — 81001 URINALYSIS AUTO W/SCOPE: CPT

## 2024-04-09 PROCEDURE — 87186 SC STD MICRODIL/AGAR DIL: CPT

## 2024-04-09 PROCEDURE — 87088 URINE BACTERIA CULTURE: CPT

## 2024-04-09 PROCEDURE — 87086 URINE CULTURE/COLONY COUNT: CPT

## 2024-04-09 NOTE — TELEPHONE ENCOUNTER
[facility] Call Center    Phone Message    May a detailed message be left on voicemail: yes     Reason for Call: Patient reports having another bladder infection. Please call patient to discuss.    Symptoms: lower back pain, frequent urination, cramps, cloudy urine, smelly urine, pain when urinating.    Action Taken: Message routed to:  [facility] ([facility] Urology    Travel Screening: Not Applicable

## 2024-04-09 NOTE — PROGRESS NOTES
Call placed to patient and let her know that I have placed an order for ua/uc and she can call [phone] to make a lab appointment to give her sample. Patient voices understanding.    Thank you,  Ellen Jackson RN, BSN Urology Triage

## 2024-04-11 ENCOUNTER — THERAPY VISIT (OUTPATIENT)
Dept: PHYSICAL THERAPY | Facility: REHABILITATION | Age: 59
End: 2024-04-11
Payer: MEDICARE

## 2024-04-11 DIAGNOSIS — G89.29 CHRONIC RIGHT SHOULDER PAIN: Primary | ICD-10-CM

## 2024-04-11 DIAGNOSIS — M25.511 CHRONIC RIGHT SHOULDER PAIN: Primary | ICD-10-CM

## 2024-04-11 LAB
BACTERIA UR CULT: ABNORMAL
BACTERIA UR CULT: ABNORMAL

## 2024-04-11 PROCEDURE — 97110 THERAPEUTIC EXERCISES: CPT | Mod: GP | Performed by: PHYSICAL THERAPIST

## 2024-04-11 PROCEDURE — 97112 NEUROMUSCULAR REEDUCATION: CPT | Mod: GP | Performed by: PHYSICAL THERAPIST

## 2024-04-12 DIAGNOSIS — N39.0 UTI (URINARY TRACT INFECTION): Primary | ICD-10-CM

## 2024-04-12 RX ORDER — CIPROFLOXACIN 500 MG/1
500 TABLET, FILM COATED ORAL 2 TIMES DAILY
Qty: 10 TABLET | Refills: 0 | Status: SHIPPED | OUTPATIENT
Start: 2024-04-12 | End: 2024-04-17

## 2024-04-12 NOTE — RESULT ENCOUNTER NOTE
Let patient know of positive urine culture.Patient reports: pain with urination, lower back pain, lower abdominal bloating and slight nausea. Cipro  500 mg po BID for 5 days order placed and sent to her preferred pharmacy.

## 2024-04-12 NOTE — TELEPHONE ENCOUNTER
Call placed to patient and let her know that I have placed an order for ua/uc and she can call [phone] to make a lab appointment to give her sample. Patient voices understanding.     Thank you,  Ellen Jackson RN, BSN Urology Triage

## 2024-04-19 ENCOUNTER — ONCOLOGY VISIT (OUTPATIENT)
Dept: ONCOLOGY | Facility: CLINIC | Age: 59
End: 2024-04-19
Attending: STUDENT IN AN ORGANIZED HEALTH CARE EDUCATION/TRAINING PROGRAM
Payer: MEDICARE

## 2024-04-19 VITALS
RESPIRATION RATE: 16 BRPM | SYSTOLIC BLOOD PRESSURE: 108 MMHG | DIASTOLIC BLOOD PRESSURE: 70 MMHG | BODY MASS INDEX: 25.47 KG/M2 | WEIGHT: 170 LBS | HEART RATE: 91 BPM | OXYGEN SATURATION: 98 % | TEMPERATURE: 98.4 F

## 2024-04-19 DIAGNOSIS — C16.3 MALIGNANT NEOPLASM OF PYLORIC ANTRUM (H): Primary | ICD-10-CM

## 2024-04-19 DIAGNOSIS — T45.1X5A PERIPHERAL NEUROPATHY DUE TO CHEMOTHERAPY (H): ICD-10-CM

## 2024-04-19 DIAGNOSIS — G89.3 CHRONIC PAIN DUE TO NEOPLASM: ICD-10-CM

## 2024-04-19 DIAGNOSIS — R26.89 BALANCE PROBLEMS: ICD-10-CM

## 2024-04-19 DIAGNOSIS — G62.0 PERIPHERAL NEUROPATHY DUE TO CHEMOTHERAPY (H): ICD-10-CM

## 2024-04-19 PROCEDURE — G0463 HOSPITAL OUTPT CLINIC VISIT: HCPCS | Performed by: STUDENT IN AN ORGANIZED HEALTH CARE EDUCATION/TRAINING PROGRAM

## 2024-04-19 PROCEDURE — 99215 OFFICE O/P EST HI 40 MIN: CPT | Performed by: STUDENT IN AN ORGANIZED HEALTH CARE EDUCATION/TRAINING PROGRAM

## 2024-04-19 ASSESSMENT — PAIN SCALES - GENERAL: PAINLEVEL: MODERATE PAIN (4)

## 2024-04-19 NOTE — NURSING NOTE
"Oncology Rooming Note    April 19, 2024 11:25 AM   Nathalie Bashir is a 58 year old female who presents for:    Chief Complaint   Patient presents with    Oncology Clinic Visit     Gastric cancer      Initial Vitals: /70   Pulse 91   Temp 98.4  F (36.9  C)   Resp 16   Wt 77.1 kg (170 lb)   LMP 09/23/2014   SpO2 98%   BMI 25.47 kg/m   Estimated body mass index is 25.47 kg/m  as calculated from the following:    Height as of 11/16/23: 1.74 m (5' 8.5\").    Weight as of this encounter: 77.1 kg (170 lb). Body surface area is 1.93 meters squared.  Moderate Pain (4) Comment: Data Unavailable   Patient's last menstrual period was 09/23/2014.  Allergies reviewed: Yes  Medications reviewed: Yes    Medications: Medication refills not needed today.  Pharmacy name entered into EPIC:    [facility] [place], [place] - [id number]  [facility] [id number] [place] [facility] [address]  [facility] [address] [place], [place] - [address] [place] [address] [facility] - [place], [place] - [address] [facility] [place], [place] - [address] [facility] - [place], [place] - [address]    Frailty Screening:   Is the patient here for a new oncology consult visit in cancer care? 2. No      Clinical concerns: no other complaints      Osmel Tafoya"

## 2024-04-19 NOTE — PATIENT INSTRUCTIONS
It was nice to see you again today.    1.  A referral to spine medicine clinic was placed today for further evaluation and management of your neck pain.  2.  Follow-up with Dr. Gallegos for a repeat steroid injection for your right shoulder. Phone number to schedule is:  [phone]  3.  Continue your regular home exercise regimen as tolerated.  4.  Continue your current pain medication regimen and management per the pain team.  5.  Follow-up with Dr. Dobbs in 6 to 8 months as needed.

## 2024-04-19 NOTE — LETTER
4/19/2024         RE: Nathalie Bashir  [address] [address]        Dear Colleague,    Thank you for referring your patient, Nathalie Bashir, to the [facility]. Please see a copy of my visit note below.    [facility]   PM&R clinic note        Interval history:     Nathalie Bashir presents to clinic today for follow up reg her rehab needs.   She has h/o gastric adenocarcinoma (diagnosed during whipple in 1/2020 for presumed IPMN, no pancreatic malignancy found).   Was last seen in clinic on 10/19/23.  Recommendations included:  Therapy/equipment/braces:  Continue regular home exercise regimen as tolerated.  Continue using cane as assistive device for safety and fall prevention.  Medications:  Continue with current pain medication regimen including oxycodone as prescribed by palliative care.  Referral / follow up with other providers:  Comprehensive pain clinic referral placed.  Follow up: 6 to 8 months.    ER visit on 4/1/24 for migraine headaches, nausea and vomiting, imitrex and zofran with no relief. Droperidol injection given.      Symptoms,  Nathalie was seen for a return visit today.  She states she has had a lot of family changes, her 's dad passed away since her last visit. His mother was living with them for a while.  She found a pain clinic in [facility].   She is complaining of cervical pain that has been chronic and she has had cervical epidural injections done in the past.   She saw Dr. Gallegos for a right glenohumeral joint CS injection on 1/18/24. She is thinking she would like another injection as her right shoulder is acting up again.  Her neuropathy has continued to be bothersome for her. She takes the flexeril TID and has some relief from it. She still follows with Dr. Millard, and continues on medication regimen including celexa.   Her overall strength has felt stable per her history.   She had one  fall in Dec and had a right sided rib fracture which is continuing to heal.  She has been seen in Pain Clinic, and feels like the care has been really good. She was prescribed ketamine/gabapentin cream and it has significantly helped her neuropathy.       Therapies/HEP,  Not currently participating in therapy.  Continues with home exercise regimen as tolerated.       Functionally,   Modified independent with mobility using cane.  Independent with ADLs and IADLs.  No change from previous visit.      Social history is unchanged.      Medications:  Current Outpatient Medications   Medication Sig Dispense Refill    amitriptyline (ELAVIL) 75 MG tablet Take 1 tablet (75 mg) by mouth at bedtime 30 tablet 3    citalopram (CELEXA) 20 MG tablet Take 1.5 tablets (30 mg) by mouth daily 135 tablet 1    COMPOUNDED NON-CONTROLLED SUBSTANCE (CMPD RX) - PHARMACY TO MIX COMPOUNDED MEDICATION Estriol 1 mg/gram,place 1 gram vaginally three times per week,42.5 grams (Patient not taking: Reported on 3/15/2024) 1 each 3    Cyanocobalamin 1000 MCG CAPS Take 1 tablet by mouth every morning      cyclobenzaprine (FLEXERIL) 10 MG tablet TAKE 1 TABLET BY MOUTH THREE TIMES A DAY AS NEEDED FOR MUSCLE SPASMS 90 tablet 3    famotidine (PEPCID) 20 MG tablet TAKE 1 TABLET (20 MG) BY MOUTH 2 TIMES DAILY AS NEEDED 180 tablet 1    fluticasone (FLONASE) 50 MCG/ACT nasal spray Spray 1 spray into both nostrils daily at 2 pm      hydrOXYzine (VISTARIL) 25 MG capsule Take 25-50 mg by mouth 4 times daily as needed for itching or anxiety      ketamine HCl POWD Ketamine 8%, Gabapentin 8% in carrier lotion: apply 2-4 pumps QID PRN to affected areas. 60 g 2    loperamide (IMODIUM) 2 MG capsule Take 4 mg by mouth 4 times daily as needed for diarrhea      loratadine-pseudoePHEDrine (CLARITIN-D 24-HOUR)  MG 24 hr tablet Take 1 tablet by mouth daily as needed for allergies Usually takes in Spring/Fall (Patient not taking: Reported on 3/15/2024)      ondansetron  (ZOFRAN) 8 MG tablet Take 1 tablet (8 mg) by mouth every 8 hours as needed (Nausea/Vomiting) 90 tablet 2    oxyCODONE (ROXICODONE) 5 MG tablet Take 1 tablet (5 mg) by mouth 3 times daily as needed for breakthrough pain or moderate to severe pain Maximum 3 pills/day. Dispense 04/07/24 for start 04/09/24 90 tablet 0    pantoprazole (PROTONIX) 40 MG EC tablet Take 1 tablet (40 mg) by mouth 2 times daily 180 tablet 1    sennosides (SENOKOT) 8.6 MG tablet 1-2 tablets 1-2 times daily as needed. 120 tablet 1    simethicone 80 MG TABS Take 1 tablet by mouth every 6 hours as needed (bloating, gas, belching) 90 tablet 3    SUMAtriptan (IMITREX) 100 MG tablet Take 100 mg by mouth at onset of headache for migraine                Physical Exam:   /70   Pulse 91   Temp 98.4  F (36.9  C)   Resp 16   Wt 77.1 kg (170 lb)   LMP 09/23/2014   SpO2 98%   BMI 25.47 kg/m    Gen: NAD, pleasant and cooperative  HEENT: Normocephalic, atraumatic, extra-ocular movements appear intact  Pulm: non-labored breathing in room air  Ext: No edema in bilateral lower extremities, no calf tenderness bilaterally.  MMT: Bilateral hand deformities, L>R, especially at DIP joints, swelling, TTP  Neuro/MSK:   Orientation: Oriented to person, place, time, situation. Exhibits good insight into her condition and ongoing management/symptoms.  Motor: Moving bilateral upper and lower extremities actively against gravity with no difficulties.  5/5 in all muscle groups in bilateral lower extremities.  Sensory: intact to light touch throughout all dermatomes in bilateral lower extremities.    Labs/Imaging:  Lab Results   Component Value Date    WBC 8.7 11/13/2023    HGB 12.7 11/13/2023    HCT 38.1 11/13/2023    MCV 92 11/13/2023     11/13/2023     Lab Results   Component Value Date     11/13/2023    POTASSIUM 4.2 11/13/2023    CHLORIDE 104 11/13/2023    CO2 25 11/13/2023    GLC 95 11/13/2023     Lab Results   Component Value Date     GFRESTIMATED 77 11/13/2023    GFRESTBLACK 81 05/21/2021     Lab Results   Component Value Date    AST 23 11/13/2023    ALT 9 11/13/2023    ALKPHOS 101 11/13/2023    BILITOTAL 0.2 11/13/2023     Lab Results   Component Value Date    INR 1.21 (H) 12/07/2020     Lab Results   Component Value Date    BUN 9.0 11/13/2023    CR 0.87 11/13/2023              Assessment/Plan   Nathalie Bashir presents to clinic today for follow up reg her rehab needs.   She has h/o gastric adenocarcinoma (diagnosed during whipple in 1/2020 for presumed IPMN, no pancreatic malignancy found). Was last seen in clinic on 10/19/23.  Please see below recommendations from today's visit.  Patient to follow-up in 6 to 8 months as needed.      Therapy/equipment/braces:  Continue regular home exercise regimen as tolerated.  Continue using cane as assistive device for safety and fall prevention.  Medications:  Continue with current pain medication regimen including oxycodone, gabapentin/ketamine cream for neuropathic symptoms as prescribed by comprehensive pain clinic.  Referral / follow up with other providers:  Patient to follow-up with Dr. Gallegos for repeat CS injection to her shoulder.  Spine  referral placed for neck pain which is chronic   Follow up: 6 to 8 months as needed.      Apurva Dobbs MD  Physical Medicine & Rehabilitation      50 minutes spent on the date of the encounter doing chart review, history and exam, documentation and further activities as noted above.

## 2024-04-19 NOTE — PROGRESS NOTES
[facility]   PM&R clinic note        Interval history:     Nathalie Bashir presents to clinic today for follow up reg her rehab needs.   She has h/o gastric adenocarcinoma (diagnosed during whipple in 1/2020 for presumed IPMN, no pancreatic malignancy found).   Was last seen in clinic on 10/19/23.  Recommendations included:  Therapy/equipment/braces:  Continue regular home exercise regimen as tolerated.  Continue using cane as assistive device for safety and fall prevention.  Medications:  Continue with current pain medication regimen including oxycodone as prescribed by palliative care.  Referral / follow up with other providers:  Comprehensive pain clinic referral placed.  Follow up: 6 to 8 months.    ER visit on 4/1/24 for migraine headaches, nausea and vomiting, imitrex and zofran with no relief. Droperidol injection given.      Symptoms,  Nathalie was seen for a return visit today.  She states she has had a lot of family changes, her 's dad passed away since her last visit. His mother was living with them for a while.  She found a pain clinic in [facility].   She is complaining of cervical pain that has been chronic and she has had cervical epidural injections done in the past.   She saw Dr. Gallegos for a right glenohumeral joint CS injection on 1/18/24. She is thinking she would like another injection as her right shoulder is acting up again.  Her neuropathy has continued to be bothersome for her. She takes the flexeril TID and has some relief from it. She still follows with Dr. Millard, and continues on medication regimen including celexa.   Her overall strength has felt stable per her history.   She had one fall in Dec and had a right sided rib fracture which is continuing to heal.  She has been seen in Pain Clinic, and feels like the care has been really good. She was prescribed ketamine/gabapentin cream and it has significantly helped her neuropathy.        Therapies/HEP,  Not currently participating in therapy.  Continues with home exercise regimen as tolerated.       Functionally,   Modified independent with mobility using cane.  Independent with ADLs and IADLs.  No change from previous visit.      Social history is unchanged.      Medications:  Current Outpatient Medications   Medication Sig Dispense Refill    amitriptyline (ELAVIL) 75 MG tablet Take 1 tablet (75 mg) by mouth at bedtime 30 tablet 3    citalopram (CELEXA) 20 MG tablet Take 1.5 tablets (30 mg) by mouth daily 135 tablet 1    COMPOUNDED NON-CONTROLLED SUBSTANCE (CMPD RX) - PHARMACY TO MIX COMPOUNDED MEDICATION Estriol 1 mg/gram,place 1 gram vaginally three times per week,42.5 grams (Patient not taking: Reported on 3/15/2024) 1 each 3    Cyanocobalamin 1000 MCG CAPS Take 1 tablet by mouth every morning      cyclobenzaprine (FLEXERIL) 10 MG tablet TAKE 1 TABLET BY MOUTH THREE TIMES A DAY AS NEEDED FOR MUSCLE SPASMS 90 tablet 3    famotidine (PEPCID) 20 MG tablet TAKE 1 TABLET (20 MG) BY MOUTH 2 TIMES DAILY AS NEEDED 180 tablet 1    fluticasone (FLONASE) 50 MCG/ACT nasal spray Spray 1 spray into both nostrils daily at 2 pm      hydrOXYzine (VISTARIL) 25 MG capsule Take 25-50 mg by mouth 4 times daily as needed for itching or anxiety      ketamine HCl POWD Ketamine 8%, Gabapentin 8% in carrier lotion: apply 2-4 pumps QID PRN to affected areas. 60 g 2    loperamide (IMODIUM) 2 MG capsule Take 4 mg by mouth 4 times daily as needed for diarrhea      loratadine-pseudoePHEDrine (CLARITIN-D 24-HOUR)  MG 24 hr tablet Take 1 tablet by mouth daily as needed for allergies Usually takes in Spring/Fall (Patient not taking: Reported on 3/15/2024)      ondansetron (ZOFRAN) 8 MG tablet Take 1 tablet (8 mg) by mouth every 8 hours as needed (Nausea/Vomiting) 90 tablet 2    oxyCODONE (ROXICODONE) 5 MG tablet Take 1 tablet (5 mg) by mouth 3 times daily as needed for breakthrough pain or moderate to severe pain  Maximum 3 pills/day. Dispense 04/07/24 for start 04/09/24 90 tablet 0    pantoprazole (PROTONIX) 40 MG EC tablet Take 1 tablet (40 mg) by mouth 2 times daily 180 tablet 1    sennosides (SENOKOT) 8.6 MG tablet 1-2 tablets 1-2 times daily as needed. 120 tablet 1    simethicone 80 MG TABS Take 1 tablet by mouth every 6 hours as needed (bloating, gas, belching) 90 tablet 3    SUMAtriptan (IMITREX) 100 MG tablet Take 100 mg by mouth at onset of headache for migraine                Physical Exam:   /70   Pulse 91   Temp 98.4  F (36.9  C)   Resp 16   Wt 77.1 kg (170 lb)   LMP 09/23/2014   SpO2 98%   BMI 25.47 kg/m    Gen: NAD, pleasant and cooperative  HEENT: Normocephalic, atraumatic, extra-ocular movements appear intact  Pulm: non-labored breathing in room air  Ext: No edema in bilateral lower extremities, no calf tenderness bilaterally.  MMT: Bilateral hand deformities, L>R, especially at DIP joints, swelling, TTP  Neuro/MSK:   Orientation: Oriented to person, place, time, situation. Exhibits good insight into her condition and ongoing management/symptoms.  Motor: Moving bilateral upper and lower extremities actively against gravity with no difficulties.  5/5 in all muscle groups in bilateral lower extremities.  Sensory: intact to light touch throughout all dermatomes in bilateral lower extremities.    Labs/Imaging:  Lab Results   Component Value Date    WBC 8.7 11/13/2023    HGB 12.7 11/13/2023    HCT 38.1 11/13/2023    MCV 92 11/13/2023     11/13/2023     Lab Results   Component Value Date     11/13/2023    POTASSIUM 4.2 11/13/2023    CHLORIDE 104 11/13/2023    CO2 25 11/13/2023    GLC 95 11/13/2023     Lab Results   Component Value Date    GFRESTIMATED 77 11/13/2023    GFRESTBLACK 81 05/21/2021     Lab Results   Component Value Date    AST 23 11/13/2023    ALT 9 11/13/2023    ALKPHOS 101 11/13/2023    BILITOTAL 0.2 11/13/2023     Lab Results   Component Value Date    INR 1.21 (H) 12/07/2020      Lab Results   Component Value Date    BUN 9.0 11/13/2023    CR 0.87 11/13/2023              Assessment/Plan   Nathalie Bashir presents to clinic today for follow up reg her rehab needs.   She has h/o gastric adenocarcinoma (diagnosed during whipple in 1/2020 for presumed IPMN, no pancreatic malignancy found). Was last seen in clinic on 10/19/23.  Please see below recommendations from today's visit.  Patient to follow-up in 6 to 8 months as needed.      Therapy/equipment/braces:  Continue regular home exercise regimen as tolerated.  Continue using cane as assistive device for safety and fall prevention.  Medications:  Continue with current pain medication regimen including oxycodone, gabapentin/ketamine cream for neuropathic symptoms as prescribed by comprehensive pain clinic.  Referral / follow up with other providers:  Patient to follow-up with Dr. Gallegos for repeat CS injection to her shoulder.  Spine  referral placed for neck pain which is chronic   Follow up: 6 to 8 months as needed.      Apurva Dobbs MD  Physical Medicine & Rehabilitation      50 minutes spent on the date of the encounter doing chart review, history and exam, documentation and further activities as noted above.

## 2024-04-27 ENCOUNTER — HOSPITAL ENCOUNTER (EMERGENCY)
Facility: CLINIC | Age: 59
Discharge: HOME | End: 2024-04-27
Attending: EMERGENCY MEDICINE | Admitting: EMERGENCY MEDICINE
Payer: MEDICARE

## 2024-04-27 VITALS
BODY MASS INDEX: 25.04 KG/M2 | DIASTOLIC BLOOD PRESSURE: 75 MMHG | HEART RATE: 88 BPM | OXYGEN SATURATION: 97 % | RESPIRATION RATE: 18 BRPM | TEMPERATURE: 98.3 F | SYSTOLIC BLOOD PRESSURE: 114 MMHG | HEIGHT: 68 IN | WEIGHT: 165.2 LBS

## 2024-04-27 DIAGNOSIS — M54.2 NECK PAIN: ICD-10-CM

## 2024-04-27 DIAGNOSIS — R51.9 NONINTRACTABLE HEADACHE, UNSPECIFIED CHRONICITY PATTERN, UNSPECIFIED HEADACHE TYPE: ICD-10-CM

## 2024-04-27 LAB
HOLD SPECIMEN: NORMAL

## 2024-04-27 PROCEDURE — 96365 THER/PROPH/DIAG IV INF INIT: CPT

## 2024-04-27 PROCEDURE — 96375 TX/PRO/DX INJ NEW DRUG ADDON: CPT

## 2024-04-27 PROCEDURE — 99284 EMERGENCY DEPT VISIT MOD MDM: CPT | Performed by: EMERGENCY MEDICINE

## 2024-04-27 PROCEDURE — 250N000011 HC RX IP 250 OP 636: Performed by: EMERGENCY MEDICINE

## 2024-04-27 PROCEDURE — 96366 THER/PROPH/DIAG IV INF ADDON: CPT

## 2024-04-27 PROCEDURE — 99284 EMERGENCY DEPT VISIT MOD MDM: CPT | Mod: 25

## 2024-04-27 PROCEDURE — 258N000003 HC RX IP 258 OP 636: Performed by: EMERGENCY MEDICINE

## 2024-04-27 RX ORDER — MAGNESIUM SULFATE 1 G/100ML
1 INJECTION INTRAVENOUS ONCE
Qty: 100 ML | Refills: 0 | Status: COMPLETED | OUTPATIENT
Start: 2024-04-27 | End: 2024-04-27

## 2024-04-27 RX ORDER — DROPERIDOL 2.5 MG/ML
0.62 INJECTION, SOLUTION INTRAMUSCULAR; INTRAVENOUS ONCE
Status: COMPLETED | OUTPATIENT
Start: 2024-04-27 | End: 2024-04-27

## 2024-04-27 RX ADMIN — DROPERIDOL 0.62 MG: 2.5 INJECTION, SOLUTION INTRAMUSCULAR; INTRAVENOUS at 16:19

## 2024-04-27 RX ADMIN — SODIUM CHLORIDE 1000 ML: 9 INJECTION, SOLUTION INTRAVENOUS at 16:16

## 2024-04-27 RX ADMIN — MAGNESIUM SULFATE IN DEXTROSE 1 G: 10 INJECTION, SOLUTION INTRAVENOUS at 16:23

## 2024-04-27 ASSESSMENT — ACTIVITIES OF DAILY LIVING (ADL)
ADLS_ACUITY_SCORE: 37
ADLS_ACUITY_SCORE: 37
ADLS_ACUITY_SCORE: 35

## 2024-04-27 ASSESSMENT — COLUMBIA-SUICIDE SEVERITY RATING SCALE - C-SSRS
6. HAVE YOU EVER DONE ANYTHING, STARTED TO DO ANYTHING, OR PREPARED TO DO ANYTHING TO END YOUR LIFE?: NO
1. IN THE PAST MONTH, HAVE YOU WISHED YOU WERE DEAD OR WISHED YOU COULD GO TO SLEEP AND NOT WAKE UP?: NO
2. HAVE YOU ACTUALLY HAD ANY THOUGHTS OF KILLING YOURSELF IN THE PAST MONTH?: NO

## 2024-04-27 NOTE — ED TRIAGE NOTES
"Chief Complaint: Patient presents to the ED for neck/headache that began early this morning. Patient reports pain and pressure increases with bending over, and a general feeling of things \"not feeling right.\"    Onset of Symptoms: 0400, 04/27/2024    Home Remedies: cream, and prescribed oxycodone.     Triage Vital Signs: /75   Pulse 88   Temp 98.3  F (36.8  C) (Oral)   Resp 18   Ht 1.727 m (5' 8\")   Wt 74.9 kg (165 lb 3.2 oz)   LMP 09/23/2014   SpO2 97%   BMI 25.12 kg/m      Sanjay Fang RN  04/27/2024     Triage Assessment (Adult)       Row Name 04/27/24 1512          Triage Assessment    Airway WDL WDL        Respiratory WDL    Respiratory WDL WDL        Skin Circulation/Temperature WDL    Skin Circulation/Temperature WDL WDL        Cardiac WDL    Cardiac WDL WDL        Peripheral/Neurovascular WDL    Peripheral Neurovascular WDL WDL        Cognitive/Neuro/Behavioral WDL    Cognitive/Neuro/Behavioral WDL WDL                     "

## 2024-04-27 NOTE — DISCHARGE INSTRUCTIONS
Please make an appointment to follow up with Your Primary Care Provider in 2 days if you have any concerns.  If your symptoms recur and are not relieved with home remedies please come back to the emergency department.

## 2024-04-27 NOTE — ED PROVIDER NOTES
"    [place] EMERGENCY DEPARTMENT ([facility])    4/27/24       ED PROVIDER NOTE  Vertical Triage A 3:48 PM   History     Chief Complaint   Patient presents with    Neck Pain     The history is provided by the patient and medical records.     Nathalie Bashir is a 58 year old female with a past medical history significant for suspected pancreatic cancer status post whipple (no pancreatic malignancy found), gastric adenocarcinoma, cataract surgery, chronic pain on long term opiates, chronic neck pain with cervical epidural injections done in the past who presents to the Emergency Department with bilateral posterior neck pain radiating up into her head that started at 3-4 AM. The pain is \"overtaking everything for me,\" feels tight. She notes prior history of neck injuries but never hurt like this before.  Has nausea and light sensitivity with this, but doesn't feel like typical migraine for her. She feels generally ill with pounding headache, doesn't usually feel like this. She has tried imitrex and Zofran without improvement.     Patient notes being seen in ED on 4/1/24 for migraine headaches, nausea and vomiting, imitrex, compazine, and zofran with no relief. Droperidol injection given.     Past Medical History  Past Medical History:   Diagnosis Date    Allergic rhinitis     Cancer (H)     stomach cancer    Chronic pain     Closed fracture of distal end of left radius     Depression     Gastric adenocarcinoma (H)     Lumbago     Migraine     Opioid dependence (H)     Other chronic pain     low back    Sacroiliitis (H24)     low back pain    Trochanteric bursitis     leg length discrrepancy, hip pain     Past Surgical History:   Procedure Laterality Date    ARTHROPLASTY HIP Left 2016    BACK SURGERY      L4-5 decompression    CATARACT IOL, RT/LT      CHOLECYSTECTOMY N/A 1/28/2020    Procedure: Cholecystectomy;  Surgeon: Yandel Mallory MD;  Location: [facility] OR    ENDOSCOPIC RETROGRADE CHOLANGIOPANCREATOGRAM N/A " 7/27/2020    Procedure: ENDOSCOPIC RETROGRADE CHOLANGIOPANCREATOGRAPHY  **Latex Allergy** with jejunal biopsies;  Surgeon: Jeet Aviles MD;  Location: [facility] OR    ESOPHAGOSCOPY, GASTROSCOPY, DUODENOSCOPY (EGD), COMBINED N/A 3/3/2020    Procedure: ESOPHAGOGASTRODUODENOSCOPY, WITH ENDOSCOPIC US (enoxaparin);  Surgeon: Bakari Plata MD;  Location: [facility] GI    ESOPHAGOSCOPY, GASTROSCOPY, DUODENOSCOPY (EGD), COMBINED N/A 12/7/2020    Procedure: Upper Endoscopy with stent placement;  Surgeon: Jeet Aviles MD;  Location:  OR    ESOPHAGOSCOPY, GASTROSCOPY, DUODENOSCOPY (EGD), COMBINED N/A 2/8/2021    Procedure: ESOPHAGOGASTRODUODENOSCOPY (EGD)AND STENT REMOVAL;  Surgeon: Jeet Aviles MD;  Location:  OR    EYE SURGERY      GASTRECTOMY N/A 10/21/2020    Procedure: Open subtotal gastectomy with David en Y Reconstruction; D1 Lymph Node Disection  **Latex Allergy**;  Surgeon: Yandel Mallory MD;  Location: [facility] OR    INJECT BLOCK MEDIAL BRANCH CERVICAL/THORACIC/LUMBAR Bilateral 9/27/2023    Procedure: BILATERAL L4-5, L5-S1 medial branch block #1;  Surgeon: Maria Victoria Urbina MD;  Location: [facility] OR    IR CHEST PORT PLACEMENT > 5 YRS OF AGE  3/26/2020    IR LUMBAR EPIDURAL STEROID INJECTION  8/25/2009    IR LUMBAR EPIDURAL STEROID INJECTION  9/2/2009    IR LUMBAR EPIDURAL STEROID INJECTION  9/25/2009    IR LUMBAR EPIDURAL STEROID INJECTION  11/23/2009    IR LUMBAR EPIDURAL STEROID INJECTION  4/1/2010    IR LUMBAR EPIDURAL STEROID INJECTION  9/1/2010    IR LUMBAR EPIDURAL STEROID INJECTION  3/10/2011    IR LUMBAR EPIDURAL STEROID INJECTION  8/30/2011    IR PORT REMOVAL RIGHT  12/8/2023    LAPAROSCOPY DIAGNOSTIC (GENERAL) N/A 10/13/2020    Procedure: Diagnostic laparoscopy with peritoneal washings  **Latex Allergy**;  Surgeon: Yandel Mallory MD;  Location: [facility] OR    OPEN REDUCTION INTERNAL FIXATION RODDING INTRAMEDULLARY FEMUR Left 12/23/2014    Procedure: OPEN REDUCTION INTERNAL FIXATION RODDING  INTRAMEDULLARY FEMUR;  Surgeon: Arnol Santiago MD;  Location: [facility] OR    OPEN REDUCTION INTERNAL FIXATION WRIST Left 9/22/2022    Procedure: LEFT WRIST OPEN REDUCTION INTERNAL FIXATION, FRACTURE;  Surgeon: Mark Villalobos MD;  [place] [facility] [place]    [place]THOPEDIC SURGERY      PICC DOUBLE LUMEN PLACEMENT Right 02/07/2020    5 fr double lumen 41 cm    IL LAMINEC/FACETECT/FORAMIN,LUMBAR 1 SEG      Description: Laminectomy Decompress, Facetectomy, Foraminotomy Lumbar Seg;  Recorded: 04/12/2012;    REMOVE HARDWARE LOWER EXTREMITY Left 4/7/2015    Procedure: REMOVE HARDWARE LOWER EXTREMITY;  Surgeon: Arnol Santiago MD;  [facility]: [facility] OR    REMOVE HARDWARE RODDING INTRAMEDULLARY FEMUR Left 12/17/2015    Procedure: REMOVE HARDWARE RODDING INTRAMEDULLARY FEMUR;  Surgeon: Arnol Santiago MD;  [facility]    RESECT BONE LOWER EXTREMITY Left 12/23/2014    Procedure: RESECT BONE LOWER EXTREMITY;  Surgeon: Arnol Santiago MD;  [facility]    SHORTENING OSTEOPLASTY FEMUR W/ IM NAILING      WHIPPLE PROCEDURE N/A 1/28/2020    Procedure: Open Whipple procedure and Cholecystectomy;  Surgeon: Yandel Mallory MD;  Location: [facility] OR    WHIPPLE PROCEDURE      ZZC FUSION OF SACROILIAC JOINT      Description: Arthrodesis Sacroiliac Joint Left;  Recorded: 10/07/2011;    ZZC REPAIR DETACH RETINA,SCLERAL BUCKLE       amitriptyline (ELAVIL) 75 MG tablet  citalopram (CELEXA) 20 MG tablet  COMPOUNDED NON-CONTROLLED SUBSTANCE (CMPD RX) - PHARMACY TO MIX COMPOUNDED MEDICATION  Cyanocobalamin 1000 MCG CAPS  cyclobenzaprine (FLEXERIL) 10 MG tablet  famotidine (PEPCID) 20 MG tablet  fluticasone (FLONASE) 50 MCG/ACT nasal spray  hydrOXYzine (VISTARIL) 25 MG capsule  ketamine HCl POWD  loperamide (IMODIUM) 2 MG capsule  loratadine-pseudoePHEDrine (CLARITIN-D 24-HOUR)  MG 24 hr tablet  ondansetron (ZOFRAN) 8 MG tablet  oxyCODONE (ROXICODONE) 5 MG tablet  pantoprazole (PROTONIX) 40 MG EC tablet  sennosides (SENOKOT) 8.6 MG tablet  simethicone  "80 MG TABS  SUMAtriptan (IMITREX) 100 MG tablet      Allergies   Allergen Reactions    Gluten Meal Palpitations    Amoxicillin-Pot Clavulanate Rash     Patient tolerated Zosyn in 2020    Oxycontin [Oxycodone] Other (See Comments)     Confusion, loopy, oxycodone is tolerated.    Pt states she tolerates regular oxycodone, cannot take 12 hour oxycontin.      Pregabalin      interfered with Cymbalta    Topiramate      Tongue numbness, taste alteration, irritable     Acetaminophen Nausea     Urine retention      Dust Mite Extract     Gabapentin Dizziness and GI Disturbance    Ketorolac Headache    Latex Rash    Methadone Fatigue    Mold     Naprosyn [Naproxen] Dizziness and GI Disturbance    Seasonal Allergies      Family History  Family History   Problem Relation Age of Onset    Heart Failure Mother 77    Dementia Father         frontal lobe    No Known Problems Sister     No Known Problems Brother     Anesthesia Reaction No family hx of     Deep Vein Thrombosis No family hx of      Social History   Social History     Tobacco Use    Smoking status: Former     Current packs/day: 0.00     Average packs/day: 0.5 packs/day for 25.0 years (12.5 ttl pk-yrs)     Types: Cigarettes     Start date: 1995     Quit date: 2020     Years since quittin.3     Passive exposure: Past    Smokeless tobacco: Never   Substance Use Topics    Alcohol use: No    Drug use: No         A medically appropriate review of systems was performed with pertinent positives and negatives noted in the HPI, and all other systems negative.    Physical Exam   BP: 114/75  Pulse: 88  Temp: 98.3  F (36.8  C)  Resp: 18  Height: 172.7 cm (5' 8\")  Weight: 74.9 kg (165 lb 3.2 oz)  SpO2: 97 %  Physical Exam  Vitals and nursing note reviewed.   Constitutional:       General: She is not in acute distress.     Appearance: Normal appearance. She is not ill-appearing, toxic-appearing or diaphoretic.   HENT:      Head: Normocephalic and atraumatic.      " Mouth/Throat:      Mouth: Mucous membranes are moist.      Pharynx: Oropharynx is clear.   Eyes:      Extraocular Movements: Extraocular movements intact.      Conjunctiva/sclera: Conjunctivae normal.      Pupils: Pupils are equal, round, and reactive to light.   Neck:      Vascular: No carotid bruit.      Meningeal: Brudzinski's sign and Kernig's sign absent.      Comments: There is no meningismus.  Cardiovascular:      Rate and Rhythm: Normal rate.      Heart sounds: Normal heart sounds.   Pulmonary:      Effort: Pulmonary effort is normal. No respiratory distress.      Breath sounds: Normal breath sounds.   Abdominal:      Palpations: Abdomen is soft.      Tenderness: There is no abdominal tenderness.   Musculoskeletal:         General: Normal range of motion.      Cervical back: Full passive range of motion without pain, normal range of motion and neck supple. Tenderness (mild, bilateral neck musculature) present. No edema, erythema, signs of trauma, rigidity, torticollis or crepitus. Muscular tenderness present. No pain with movement or spinous process tenderness. Normal range of motion.   Lymphadenopathy:      Cervical: No cervical adenopathy.   Skin:     General: Skin is warm.      Findings: No rash.   Neurological:      General: No focal deficit present.      Mental Status: She is alert and oriented to person, place, and time.      GCS: GCS eye subscore is 4. GCS verbal subscore is 5. GCS motor subscore is 6.      Cranial Nerves: Cranial nerves 2-12 are intact. No cranial nerve deficit or facial asymmetry.      Sensory: Sensation is intact. No sensory deficit.      Motor: Motor function is intact. No weakness.      Coordination: Coordination is intact. Coordination normal.      Gait: Gait is intact. Gait normal.      Deep Tendon Reflexes: Reflexes normal.      Comments: No dysarthria, dysmetria, diplopia, disdiadochokinesia. Strength 5/5 in b/l UEs with  and b/l LEs with dorsi- and plantar-flexion  against resistance. Sensation to light touch and 2-point discrimination intact in b/l distal UEs and LEs. CNs II-XII intact. Negative Romberg. Normal gait.   Psychiatric:         Mood and Affect: Mood normal.         Behavior: Behavior normal.         Thought Content: Thought content normal.         Judgment: Judgment normal.           ED Course, Procedures, & Data      Procedures               Results for orders placed or performed during the hospital encounter of 04/27/24   Ennice Draw     Status: None    Narrative    The following orders were created for panel order Ennice Draw.  Procedure                               Abnormality         Status                     ---------                               -----------         ------                     Extra Blue Top Tube[[id number]]                              Final result               Extra Red Top Tube[[id number]]                               Final result               Extra Green Top (Lithium...[[id number]]                      Final result               Extra Purple Top Tube[[id number]]                            Final result                 Please view results for these tests on the individual orders.   Extra Blue Top Tube     Status: None   Result Value Ref Range    Hold Specimen JIC    Extra Red Top Tube     Status: None   Result Value Ref Range    Hold Specimen JIC    Extra Green Top (Lithium Heparin) Tube     Status: None   Result Value Ref Range    Hold Specimen JIC    Extra Purple Top Tube     Status: None   Result Value Ref Range    Hold Specimen JIC      Medications   sodium chloride 0.9% BOLUS 1,000 mL (0 mLs Intravenous Stopped 4/27/24 1801)   magnesium sulfate 1 g in 100 mL D5W intermittent infusion (0 g Intravenous Stopped 4/27/24 1801)   droPERidol (INAPSINE) injection 0.625 mg (0.625 mg Intravenous $Given 4/27/24 1619)     Labs Ordered and Resulted from Time of ED Arrival to Time of ED Departure - No data to display  No orders to display       "    Critical care was not performed.     Medical Decision Making  The patient's presentation was of high complexity (an acute health issue posing potential threat to life or bodily function).    The patient's evaluation involved:  review of external note(s) from 1 sources (see separate area of note for details)  review of 1 test result(s) ordered prior to this encounter (see separate area of note for details)  strong consideration of a test (see separate area of note for details) that was ultimately deferred    The patient's management necessitated high risk (a decision regarding hospitalization).    Assessment & Plan    Nathalie Bashir is a 58-year-old woman presenting with bilateral neck pain radiating to posterior scalp as well as headache.  Differential diagnosis: Occipital headache, migraine headache, unlikely meningitis, unlikely carotid dissection or CVA.    After thorough history and physical exam patient appears to be in no acute distress.  She has a normal neurologic exam with no focal neurologic deficits whatsoever.  I suspect she has occipital migraine headache.  We discussed the treatment and she agreed to a bolus of IV fluids, IV magnesium, and IV droperidol.  She is allergic to IV Toradol.    5:57 PM  Patient reassessed.  She states that she feels \"great\".  She states that her symptoms have resolved.  At this point she would like to be discharged.  I believe this is reasonable given resolution of symptoms.  Symptoms are likely caused by neck pain and occipital headache.  Again, no signs of meningismus or neurologic deficit on exam.  Gait is normal.  She will follow-up with the clinic if she has any further concerns and return to the emergency department if her symptoms recur.    This part of the document was transcribed by Marisol Simon, Medical Scribe.      I have reviewed the nursing notes. I have reviewed the findings, diagnosis, plan and need for follow up with the patient.    Discharge " Medication List as of 4/27/2024  6:01 PM          Final diagnoses:   Nonintractable headache, unspecified chronicity pattern, unspecified headache type   Neck pain     I, Marisol Simon, am serving as a trained medical scribe to document services personally performed by Luca Haley MD, based on the provider's statements to me.  This document has been checked and approved by the attending provider.     ILuca MD, was physically present and have reviewed and verified the accuracy of this note documented by Marisol Simon, medical scribe.     Luca Haley MD    [facility] EMERGENCY DEPARTMENT  4/27/2024     Luca Haley MD  04/27/24 [id number]

## 2024-04-30 ENCOUNTER — MYC REFILL (OUTPATIENT)
Dept: PALLIATIVE MEDICINE | Facility: OTHER | Age: 59
End: 2024-04-30
Payer: MEDICARE

## 2024-04-30 DIAGNOSIS — G89.3 CHRONIC PAIN DUE TO NEOPLASM: ICD-10-CM

## 2024-04-30 DIAGNOSIS — M79.2 NEUROPATHIC PAIN: ICD-10-CM

## 2024-04-30 RX ORDER — OXYCODONE HYDROCHLORIDE 5 MG/1
5 TABLET ORAL 3 TIMES DAILY PRN
Qty: 90 TABLET | Refills: 0 | Status: SHIPPED | OUTPATIENT
Start: 2024-04-30 | End: 2024-06-03

## 2024-04-30 NOTE — TELEPHONE ENCOUNTER
Medication refill information reviewed.     Due date for oxyCODONE (ROXICODONE) 5 MG tablet   is 05/09/24     Prescriptions prepped for review.     Will route to provider.

## 2024-04-30 NOTE — TELEPHONE ENCOUNTER
4/30/24  8:31 AM  Refills have been requested for the following medications:         oxyCODONE (ROXICODONE) 5 MG tablet     Preferred pharmacy: [facility] [id number] [place] [facility] [place] [address] [address]

## 2024-04-30 NOTE — TELEPHONE ENCOUNTER
Received call from patient requesting refill(s) of oxyCODONE (ROXICODONE) 5 MG tablet      Last dispensed from pharmacy on 04/07/24    Patient's last office/virtual visit by prescribing provider on 03/15/24  Next office/virtual appointment scheduled for : none    Last urine drug screen date 11/03/23  Current opioid agreement on file (completed within the last year) Yes Date of opioid agreement: 02/29/24    E-prescribe to pharmacy-[facility] [address] [place] [address]     Will route to [facility] for review and preparation of prescription(s).

## 2024-06-03 ENCOUNTER — MYC REFILL (OUTPATIENT)
Dept: PALLIATIVE MEDICINE | Facility: OTHER | Age: 59
End: 2024-06-03
Payer: MEDICARE

## 2024-06-03 DIAGNOSIS — M79.2 NEUROPATHIC PAIN: ICD-10-CM

## 2024-06-03 DIAGNOSIS — G89.3 CHRONIC PAIN DUE TO NEOPLASM: ICD-10-CM

## 2024-06-03 RX ORDER — OXYCODONE HYDROCHLORIDE 5 MG/1
5 TABLET ORAL 3 TIMES DAILY PRN
Qty: 90 TABLET | Refills: 0 | Status: SHIPPED | OUTPATIENT
Start: 2024-06-03 | End: 2024-07-24

## 2024-06-03 RX ORDER — OXYCODONE HYDROCHLORIDE 5 MG/1
5 TABLET ORAL 3 TIMES DAILY PRN
Qty: 90 TABLET | Refills: 0 | Status: CANCELLED | OUTPATIENT
Start: 2024-06-03

## 2024-06-03 ASSESSMENT — PAIN SCALES - PAIN ENJOYMENT GENERAL ACTIVITY SCALE (PEG)
INTERFERED_ENJOYMENT_LIFE: 7
PEG_TOTALSCORE: 7
AVG_PAIN_PASTWEEK: 6
INTERFERED_GENERAL_ACTIVITY: 8

## 2024-06-03 NOTE — TELEPHONE ENCOUNTER
Refills have been requested for the following medications:         oxyCODONE (ROXICODONE) 5 MG tablet [Desi Amaya]     Preferred pharmacy: [facility] [id number] [place] [facility] [place], [place] [address] [address]

## 2024-06-03 NOTE — TELEPHONE ENCOUNTER
*3 month follow-up needs scheduled    Pending Prescriptions:                       Disp   Refills    oxyCODONE (ROXICODONE) 5 MG tablet        90 tab*0            Sig: Take 1 tablet (5 mg) by mouth 3 times daily as           needed for breakthrough pain or moderate to           severe pain Maximum 3 pills/day. Dispense           06/06/24 for start 06/08/24    [id number] [place] [facility] [place], [place] [address]

## 2024-06-03 NOTE — TELEPHONE ENCOUNTER
Received call from patient requesting refill(s) of t:   OXYCODONE HCL 5 MG PO TABS  Last dispensed from pharmacy on: May. 7, 2024           Patient's last office/virtual visit by prescribing provider on: Mar. 15, 2024  Next office/virtual appointment scheduled for: None on file       UDT: Nov. 3, 2023  CSA: Nov. 6, 2023           E-prescribe to    [place] [address] [place] [address]    Will route to nursing [place] for review and preparation of prescription(s).

## 2024-06-06 NOTE — PROGRESS NOTES
Date:06/07/2024      COMPREHENSIVE PAIN CLINIC FOLLOW UP EVALUATION    Ms. Nathalie Bashir on 11/3/2023 in the [facility] per request of Dr. Dobbs with regards to her pain.  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  UDS and opioid agreement singed and UDS appropriate on 11/03/2023.  Nathalie Bashir has been seen at a pain clinic in the past in [place] 10 years ago, [facility] in [facility]s and [facility].  UDS and opioid agreement signed on 11/03/2023.      Updates since last appointment on 03/15/2024 with [facility]   Paint broke and had it repaired and developed dry socket. Needs to have another crown replaced.     Helping her sister move after a divorce and she fell.    Having more neck pain L=R on left radiating down b/l arms.  She is not sleeping well.  She had JONATHON many years ago at [facility] which decreased her pain by at least 50% for 6 months.. She did PT in 04/2024 but she continues to do HEP.  Ice is helpful.    Started compounding cream with ketamine 8% and gabapentin 8% to neck, shoulder, feet and started triggering migraines so she stopped the medication.        Interventions/Injections:   1/18/24 R) shoulder injection with Dr Gallegos: approx 65% improvement, also going to PT which is helpful.      Progress Notes Reviewed:  04/27/2024 ED - [facility]  04/19/2024 Dr. Apurva Dobbs PM&R  04/11/2024 PT - R) shoulder pain  04/01/2024 ED - [facility]TE  03/15/2024 Sue Herrera, Pain Clinic    Any hospitalizations/ER/UC visits since last appointment:  yes for HA  Any falls/accidents since last appointment:  no      Primary Pain :  Neck and right shoulder      Characteristics:  No changes in pain characteristics since last appointment.    What makes  the pain better:  What makes the pain worse:  06/07/2024 current pain on 0/10 VAS:   7          03/15/2024 current pain on 0/10 VAS:   6         Current Pain Related Medications:  Any medications changes since last appointment:    Amitriptyline 75mg q hs   Celexa 20mg 1.5 tabs daily 30mg daily  Compounding medication - stopped due to migraines  Cyclobenzaprine 10 mg TID PRN - needs refills today  Hydroxyzine 25mg 1-2 tabs QID anxiety  Zofran 8mg prn nausea with migraines - needs refills today for migraines  Oxycodone 5mg TID - last dose this am.  Last script per  05/07/2024.  Stool softeners PRN  Imitrex 100mg PRN HA - per PCP      Interventions:  Schedule R) shoulder steroid injection with Dr. Gallegos.      Therapies discuss on initial consult:   Physical therapy, Pain Psychology, TENs unit, Grounding Mat, Frequency Specific Micro Current, Anti-inflammatory Lifestyle    Social:    She is  and  lives in a house.  She has 1 grown son and 4 grandchildren.  She is independent in ADL's.      Employment: She is on SSD.    Exercise:  Last PT was 2018.  Patient exercises with stretching at home. Starting PT on Monday.    Hobbies:  She enjoys her grandchildren.    Mental Health:  Patient reports depression is under control on citalopram 30mg.  Patient does not follow with a mental health care provider.                    Updates since initial consult on 11/03/2023.  Dr. Urbina - 1st lumbar MBB only gave  50% - 60% relief and will not proceed with 2nd diagnostic block.  No further follow up is scheduled.    She has MRI R) shoulder tomorrow.    She will start PT on Monday through [facility].    She needs refills of oxycodone.  Last tab was taken this am.  She also needs refills of amitriptyline.    She has been busy with her Grandchildren.       Interventions/Injections: none      Progress Notes Reviewed:  11/16/2023 Dr Magaly Pollard, Hematology & Oncology - no new issues, will discontinue port and followup in 1  year.  11/08/2023 Dr. 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  Stomach cancer was treated with gastrectomy and chemotherapy.  Doctors found stomach cancer when they did Whipple Surgery 01/2020 at [facility].   Patient has not had any spine surgery in the past.   Patient has numbness and tingling in legs in the stocking foot distribution and b/l hands.    She also has chronic low back pain. First lumbar MBB was not effective enough to warrant 2nd lumbar MBB.    Rheumatology could not find any cause for her joint pain.      Characteristics:  No changes in pain characteristics since last appointment.  The patient describes the pain as constant, stabbing, burning and numbness.   Patient has numbness and tingling in legs in the stocking foot distribution and b/l hands.      What makes the pain better:  Her pain is improved with laying down, rest.    What makes the pain worse:   She reports that the pain is made worse by prolong standing, ADL's.  11/17/2023 current pain on 0/10 VAS:   6   Worst pain:   9    Best pain:  3  11/03/2023 current pain score at 7/10, but it can be as low as 3/10 or as severe as 9/10.      Current Pain Related Medications:  Any medications changes since last appointment:    Amitriptyline 75mg q hs - she ran out of this medication for sleep.  Not helpful for neuropathic pain.  Celexa 20mg 1.5 tabs daily 30mg daily  Compounding medication  Cyclobenzaprine 10 mg TID PRN  Hydroxyzine 25mg 1-2 tabs QID anxiety  Zofran 8mg prn nausea with migraines  Oxycodone 5mg TID - last dose this am.  Since 2020 with palliative care  Stool softeners PRN  Imitrex 100mg PRN HA - per PCP    NOTE:  allergies to naprosyn, methadone, latex, ketorlac, gabapentin,  acetaminophen, topiramate, pregabalin and oxycontin        Therapies discuss on initial consult:   Physical therapy, Pain Psychology, TENs unit, Grounding Mat, Frequency Specific Micro Current, Anti-inflammatory Lifestyle    Social:    She is  and  lives in a house.  She has 1 grown son and 4 grandchildren.  She is independent in ADL's.      Employment: She is on SSD.    Exercise:  Last PT was 2018.  Patient exercises with stretching at home. Starting PT on Monday.    Hobbies:  She enjoys her grandchildren.    Mental Health:  Patient reports depression is under control on citalopram 30mg.  Patient does not follow with a mental health care provider.                  Plan on initial consult 11/03/2023:  A multimodal plan was developed today to treat your pain.  Multimodal analgesia is a strategy that reduces reliance on opioids through the use of non-opioid analgesics and therapies that have different mechanisms of action.    Will obtain UDS and opioid agreement today.      Diagnostics: lumbar MRI, XRAY R) shoulder, XRAY sacrum/hip were reviewed today.        Medications:  No changes to medications today.    Amitriptyline 75mg q hs - she ran out of this medication for sleep.  Not helpful for neuropathic pain.  Celexa 20mg 1.5 tabs daily 30mg daily  Compounding medication  Cyclobenzaprine 10 mg TID PRN  Hydroxyzine 25mg 1-2 tabs QID anxiety  Zofran 8mg prn nausea with migraines  Oxycodone 5mg TID - since 2020 with palliative care  Stool softeners PRN  Imitrex 100mg PRN HA      The following OTC pain medications may be helpful, use as directed: Voltaren Gel 1%, CBD products, Arnica products, Capsaicin products, Australian Dream Cream, Epson It, Arnica products, Lidocaine Patch, Solanpas, Biofreeze, Aspercream, Tiger Balm and Alejandro Emu cream.  Apply heat or cold PRN.      Therapies:  Discussed anti-inflammatory lifestyle.    Discussed Pain Psychology - consider in the future  Psychological treatments are also  important part of pain management.  Understanding and managing the thoughts, emotions and behaviors that accompany the discomfort can help you cope more effectively with your pain and can actually reduce the intensity of your pain.      PHYSICAL THERAPY - referral place today for R) shoulder and lumbar spine  Discussed the importance of core strengthening, ROM, stretching exercises with the patient and how each of these entities is important in decreasing pain.  Explained to the patient that the purpose of physical therapy is to teach the patient a home exercise program.  These exercises need to be performed every day in order to decrease pain and prevent future occurrences of pain.        Discussed Grounding Mat - handout provided  https://www.Neomatrix.com/watch?v=[email or web address]    Discussed Frequency Specific Microcurrent - handout provided  Treatment for Neuropathic Pain.   [facility] [phone]  [facility] [phone]  May be able to be billed as a chiropractic service depending on your insurance coverage.     Discussed Acupuncture.    Discussed Zynex TENs unit.    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  Stomach cancer was treated with gastrectomy and chemotherapy.  Doctors found stomach cancer when they did Whipple Surgery 01/2020 at [facility].  Patient has numbness and tingling in legs in the stocking foot distribution and b/l hands.  Patient has lumbar pain and has had lumbar MBB x 1 with Dr. Urbina.  Patient has not had any spine surgery in the past.  The patient describes the pain as constant, stabbing, burning and numbness.  She reports that the pain is made worse by prolong standing,  ADL's.  Her pain is improved with laying down, rest.  She rates her currenty pain score at 7/10, but it can be as low as 3/10 or as severe as 9/10. Patient follows with Dr. Urbina for lumbar intervention options.  Rheumatology could not find any cause for her joint pain.      Progress Notes Reviewed:  10/19/2023 Dr. Dobbs, PM&R - order quad cane and compression stockings, follow-up with Dr. Urbina for low back pain  10/17/2023 Dr. Mckoy, Rheumatology  10/01/2023 UC - UTI  09/27/2023 Dr. Urbina - lumbar MBB L4-5 and L5-S1  09/12/2023 Dr. Urbina    Patient reports depression is under control on citalopram 30mg.  Patient does not follow with a mental health care provider.  Last PT was 2018.  Patient exercises with stretching at home.    She denies any new problems with falls or balance, any new numbness or weakness of the arms or legs, any new bowel or bladder incontinence, any night sweats or unexplained fevers, or any sudden or unexpected weight loss.      Nathalie Bashir has been seen at a pain clinic in the past in [place] 10 years ago, [facility] in 1990s and  [facility].      Current Treatments:  Amitriptyline 75mg q hs - she ran out of this medication for sleep.  Not helpful for neuropathic pain.  Celexa 20mg 1.5 tabs daily 30mg daily  Compounding medication  Cyclobenzaprine 10 mg TID PRN  Hydroxyzine 25mg 1-2 tabs QID anxiety  Zofran 8mg prn nausea with migraines  Oxycodone 5mg TID - since 2020 with palliative care  Stool softeners PRN  Imitrex 100mg PRN HA    NOTE:  allergies to naprosyn, methadone, latex, ketorlac, gabapentin, acetaminophen, topiramate, pregabalin and oxycontin    Previous Medication Treatments Included:  Anti-convulsants: Gabapentin, pregabalin had negative SE  Muscle relaxors: Tizanidine not helpful  Anti-depressants: amitriptyline was not helpful for pain, duloxetine had negative SE with mood  Benzodiazapine's: none  Acetaminophen/NSAIDs: negative SE  Topicals: Voltaren gel  1%  Headache abortives: imitrex  Headache prophylactics:   Opioids: MS ER 30mg BID, Oxycodone 10mg BID at previous pain - [facility] in 1990, buprenorphine caused negative SE      Other Treatments Have Included:  Physical therapy: yes over 2 years ago  Pain Psychology: no  Chiropractic: yes - not helpful  Acupuncture: yes - not helpful  TENs Unit: yes she has one  Injections: 09/27/2023 lumbar MBB #1  Surgeries: L) hip replacement 2015, L) SI fusion 2008  Dry Needling: no  Massage:no    Past Medical History:  Medical history reviewed.  Past Medical History:   Diagnosis Date    Allergic rhinitis     Cancer (H)     stomach cancer    Chronic pain     Closed fracture of distal end of left radius     Depression     Gastric adenocarcinoma (H)     Lumbago     Migraine     Opioid dependence (H)     Other chronic pain     low back    Sacroiliitis (H24)     low back pain    Trochanteric bursitis     leg length discrrepancy, hip pain      Patient Active Problem List   Diagnosis    Lower limb length difference    IPMN (intraductal papillary mucinous neoplasm)    Allergic rhinitis    Arthralgia of hip    Bursitis    Chronic neck pain    Chronic pain disorder    Continuous opioid dependence (H)    Controlled substance agreement signed    Debility    Degeneration of intervertebral disc of lumbosacral region    Depression    Disorder of sacrum    Greater trochanteric bursitis of left hip    Infected prosthesis of left hip (H24)    Chronic bilateral low back pain without sciatica    MDD (major depressive disorder), recurrent, in partial remission (H24)    Migraine with aura    Sacroiliitis (H24)    Screening for malignant neoplasm of cervix    Spondylosis of lumbosacral spine without myelopathy    Status post left hip replacement    Cervical radiculopathy, chronic    Unilateral inguinal hernia without obstruction or gangrene    Long term (current) use of opiate analgesic    Low urine output    Hypophosphatemia    Leukocytosis     Intra-abdominal fluid collection    Bacteremia due to Gram-negative bacteria    Malnutrition (H24)    Gastric adenocarcinoma (H)    Pneumonia    Mucositis    Chemotherapy-induced neutropenia (H24)    Gastric cancer (H)    Cholangitis (H28)    Thrombocytopenia (H24)    Urinary tract infection    Crohn's disease of small intestine (H)    Jejunitis    Colitis    Abdominal pain    Malignant neoplasm of overlapping sites of stomach (H)    Iron deficiency anemia    Anemia    Paralytic ileus (H)    Hypokalemia    Nausea & vomiting    Dysphagia    History of gastric surgery    Chemotherapy-induced neuropathy (H24)    Bilateral hand pain    Pyelonephritis    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  Past Surgical History:   Procedure Laterality Date    ARTHROPLASTY HIP Left 2016    BACK SURGERY      L4-5 decompression    CATARACT IOL, RT/LT      CHOLECYSTECTOMY N/A 1/28/2020    Procedure: Cholecystectomy;  Surgeon: Yandel Mallory MD;  Location: [facility] OR    ENDOSCOPIC RETROGRADE CHOLANGIOPANCREATOGRAM N/A 7/27/2020    Procedure: ENDOSCOPIC RETROGRADE CHOLANGIOPANCREATOGRAPHY  **Latex Allergy** with jejunal biopsies;  Surgeon: Jeet Aviles MD;  Location:  OR    ESOPHAGOSCOPY, GASTROSCOPY, DUODENOSCOPY (EGD), COMBINED N/A 3/3/2020    Procedure: ESOPHAGOGASTRODUODENOSCOPY, WITH ENDOSCOPIC US (enoxaparin);  Surgeon: Bakari Plata MD;  Location:  GI    ESOPHAGOSCOPY, GASTROSCOPY, DUODENOSCOPY (EGD), COMBINED N/A 12/7/2020    Procedure: Upper Endoscopy with stent placement;  Surgeon: Jeet Aviles MD;  Location:  OR    ESOPHAGOSCOPY, GASTROSCOPY, DUODENOSCOPY (EGD), COMBINED N/A 2/8/2021    Procedure: ESOPHAGOGASTRODUODENOSCOPY (EGD)AND STENT REMOVAL;   Surgeon: Jeet Aviles MD;  Location:  OR    EYE SURGERY      GASTRECTOMY N/A 10/21/2020    Procedure: Open subtotal gastectomy with David en Y Reconstruction; D1 Lymph Node Disection  **Latex Allergy**;  Surgeon: Yandel Mallory MD;  Location: [facility] OR    INJECT BLOCK MEDIAL BRANCH CERVICAL/THORACIC/LUMBAR Bilateral 9/27/2023    Procedure: BILATERAL L4-5, L5-S1 medial branch block #1;  Surgeon: Maria Victoria Urbina MD;  Location: [facility] OR    IR CHEST PORT PLACEMENT > 5 YRS OF AGE  3/26/2020    IR LUMBAR EPIDURAL STEROID INJECTION  8/25/2009    IR LUMBAR EPIDURAL STEROID INJECTION  9/2/2009    IR LUMBAR EPIDURAL STEROID INJECTION  9/25/2009    IR LUMBAR EPIDURAL STEROID INJECTION  11/23/2009    IR LUMBAR EPIDURAL STEROID INJECTION  4/1/2010    IR LUMBAR EPIDURAL STEROID INJECTION  9/1/2010    IR LUMBAR EPIDURAL STEROID INJECTION  3/10/2011    IR LUMBAR EPIDURAL STEROID INJECTION  8/30/2011    IR PICC PLACEMENT > 5 YRS OF AGE  10/12/2016    IR PORT REMOVAL RIGHT  12/8/2023    LAPAROSCOPY DIAGNOSTIC (GENERAL) N/A 10/13/2020    Procedure: Diagnostic laparoscopy with peritoneal washings  **Latex Allergy**;  Surgeon: Yandel Mallory MD;  Location: [facility] OR    OPEN REDUCTION INTERNAL FIXATION RODDING INTRAMEDULLARY FEMUR Left 12/23/2014    Procedure: OPEN REDUCTION INTERNAL FIXATION RODDING INTRAMEDULLARY FEMUR;  Surgeon: Arnol Santiago MD;  [facility]: [facility] OR    OPEN REDUCTION INTERNAL FIXATION WRIST Left 9/22/2022    Procedure: LEFT WRIST OPEN REDUCTION INTERNAL FIXATION, FRACTURE;  Surgeon: Mark Villalobos MD;  [facility]    ORTHOPEDIC SURGERY      PICC DOUBLE LUMEN PLACEMENT Right 02/07/2020    5 fr double lumen 41 cm    ID LAMINEC/FACETECT/FORAMIN,LUMBAR 1 SEG      Description: Laminectomy Decompress, Facetectomy, Foraminotomy Lumbar Seg;  Recorded: 04/12/2012;    REMOVE HARDWARE LOWER EXTREMITY Left 4/7/2015    Procedure: REMOVE HARDWARE LOWER EXTREMITY;  Surgeon: Arnol Santiago MD;  [facility] OR    REMOVE  HARDWARE RODDING INTRAMEDULLARY FEMUR Left 12/17/2015    Procedure: REMOVE HARDWARE RODDING INTRAMEDULLARY FEMUR;  Surgeon: Arnol Santiago MD;  [facility]    RESECT BONE LOWER EXTREMITY Left 12/23/2014    Procedure: RESECT BONE LOWER EXTREMITY;  Surgeon: Arnol Santiago MD;  [facility] OR    SHORTENING OSTEOPLASTY FEMUR W/ IM NAILING      WHIPPLE PROCEDURE N/A 1/28/2020    Procedure: Open Whipple procedure and Cholecystectomy;  Surgeon: Yandel Mallory MD;  Location: [facility] OR    WHIPPLE PROCEDURE      ZZC FUSION OF SACROILIAC JOINT      Description: Arthrodesis Sacroiliac Joint Left;  Recorded: 10/07/2011;    ZZC REPAIR DETACH RETINA,SCLERAL BUCKLE          Medications: Pertinent medications reviewed.  Current Outpatient Medications   Medication Sig Dispense Refill    amitriptyline (ELAVIL) 75 MG tablet Take 1 tablet (75 mg) by mouth at bedtime 30 tablet 3    citalopram (CELEXA) 20 MG tablet Take 1.5 tablets (30 mg) by mouth daily 135 tablet 1    COMPOUNDED NON-CONTROLLED SUBSTANCE (CMPD RX) - PHARMACY TO MIX COMPOUNDED MEDICATION Estriol 1 mg/gram,place 1 gram vaginally three times per week,42.5 grams (Patient not taking: Reported on 3/15/2024) 1 each 3    Cyanocobalamin 1000 MCG CAPS Take 1 tablet by mouth every morning      cyclobenzaprine (FLEXERIL) 10 MG tablet TAKE 1 TABLET BY MOUTH THREE TIMES A DAY AS NEEDED FOR MUSCLE SPASMS 90 tablet 3    famotidine (PEPCID) 20 MG tablet TAKE 1 TABLET (20 MG) BY MOUTH 2 TIMES DAILY AS NEEDED 180 tablet 1    fluticasone (FLONASE) 50 MCG/ACT nasal spray Spray 1 spray into both nostrils daily at 2 pm      hydrOXYzine (VISTARIL) 25 MG capsule Take 25-50 mg by mouth 4 times daily as needed for itching or anxiety      ketamine HCl POWD Ketamine 8%, Gabapentin 8% in carrier lotion: apply 2-4 pumps QID PRN to affected areas. 60 g 2    loperamide (IMODIUM) 2 MG capsule Take 4 mg by mouth 4 times daily as needed for diarrhea      loratadine-pseudoePHEDrine (CLARITIN-D  24-HOUR)  MG 24 hr tablet Take 1 tablet by mouth daily as needed for allergies Usually takes in Spring/Fall (Patient not taking: Reported on 3/15/2024)      ondansetron (ZOFRAN) 8 MG tablet Take 1 tablet (8 mg) by mouth every 8 hours as needed (Nausea/Vomiting) 90 tablet 2    oxyCODONE (ROXICODONE) 5 MG tablet Take 1 tablet (5 mg) by mouth 3 times daily as needed for breakthrough pain or moderate to severe pain Maximum 3 pills/day. Dispense 06/06/24 for start 06/08/24 90 tablet 0    pantoprazole (PROTONIX) 40 MG EC tablet Take 1 tablet (40 mg) by mouth 2 times daily 180 tablet 1    sennosides (SENOKOT) 8.6 MG tablet 1-2 tablets 1-2 times daily as needed. 120 tablet 1    simethicone 80 MG TABS Take 1 tablet by mouth every 6 hours as needed (bloating, gas, belching) 90 tablet 3    SUMAtriptan (IMITREX) 100 MG tablet Take 100 mg by mouth at onset of headache for migraine         [place] Prescription Monitoring Program reviewed 06/07/2024.  No concern for abuse or misuse of controlled medications based on this report.  05/07/2024 Oxycodone 5mg 90 tabs for 30 days  04/07/2024 Oxycodone 5mg 90 tabs for 30 days  Monthly scripts for oxycodone in 2024 no other medications listed.    Allergies: Pertinent allergies reviewed.     Allergies   Allergen Reactions    Gluten Meal Palpitations    Amoxicillin-Pot Clavulanate Rash     Patient tolerated Zosyn in 7/2020    Oxycontin [Oxycodone] Other (See Comments)     Confusion, loopy, oxycodone is tolerated.    Pt states she tolerates regular oxycodone, cannot take 12 hour oxycontin.      Pregabalin      interfered with Cymbalta    Topiramate      Tongue numbness, taste alteration, irritable     Acetaminophen Nausea     Urine retention      Dust Mite Extract     Gabapentin Dizziness and GI Disturbance    Ketorolac Headache    Latex Rash    Methadone Fatigue    Mold     Naprosyn [Naproxen] Dizziness and GI Disturbance    Seasonal Allergies        Family History:   family history  includes Dementia in her father; Heart Failure (age of onset: 77) in her mother; No Known Problems in her brother and sister.    Social History:   She is  and  lives in a house.  She has 1 grown child.  She is on SSD.  She is independent in ADL's.  She has 4 grand children who live in [place].  She  reports that she quit smoking about 4 years ago. Her smoking use included cigarettes. She started smoking about 29 years ago. She has a 12.5 pack-year smoking history. She has been exposed to tobacco smoke. She has never used smokeless tobacco. She reports that she does not drink alcohol and does not use drugs.  Social History     Social History Narrative    Not on file       Physical Exam:  LMP 09/23/2014     Constitutional: She is oriented to person, place, and time.  She appears well-developed and well-nourished. She is not in acute distress.   HENT:     Head: Normocephalic and atraumatic.     Eyes: Pupils are equal, round, and reactive to light. EOM are normal. No scleral icterus.   Pulmonary/Chest:  NWOB. No respiratory distress.   Neurological: She is alert and oriented to person, place, and time. Coordination grossly normal.  Skin: Skin is warm and dry. She is not diaphoretic.   Psychiatric: She has a normal mood and affect. Her behavior is normal. Judgment and thought content normal.  Patient answers questions appropriately.  MSK: Gait is antalgic.    Cervical:  ROM is moderately painful in all planes and unrestricted.  Sensation intact to upper extremities.  Strength 5/5 in upper extremities.  Spurling's test is negative.    Narrative & Impression   EXAMINATION: CT CHEST/ABDOMEN/PELVIS W CONTRAST, 11/13/2023 12:03 PM     TECHNIQUE:  Helical CT images from the thoracic inlet through the  symphysis pubis were obtained with IV contrast. Contrast dose: Isovue  370 88cc     COMPARISON: CT abdomen and pelvis dated 8/30/2023, CT chest abdomen  pelvis dated 5/17/2023.     HISTORY: Malignant neoplasm of pyloric  antrum (H)     FINDINGS:  CHEST:  LUNGS: Central tracheobronchial tree is patent. No pneumothorax or  pleural effusion. No focal airspace opacity. No suspicious pulmonary  nodule. Unchanged subcentimeter nodule along the left fissure likely  represents an intrafissural lymph node. Stable few sub-4 mm pulmonary  nodules are likely benign.      MEDIASTINUM: Right port catheter terminates in distal SVC. Heart size  is within normal limits. No pericardial effusion. Ascending aorta and  main pulmonary artery diameters are within normal limits. Normal  appearance and configuration of the great vessels off of the aortic  arch. No suspicious mediastinal, hilar, or axillary lymph nodes.      Visualized thyroid and esophagus are unremarkable.     ABDOMEN/PELVIS:  LIVER: No suspicious focal hepatic lesion.     BILIARY: Gallbladder is surgically absent. Unchanged mild pneumobilia.  No intrahepatic or extrahepatic biliary ductal dilation.     PANCREAS: Post Whipple's procedure. No focal pancreatic lesion. No  pancreatic duct dilatation.     SPLEEN: Within normal limits.     ADRENAL GLANDS: No focal adrenal nodule.     URINARY TRACT: No suspicious renal lesion. No hydronephrosis or  hydroureter. No renal stone. Urinary bladder is unremarkable.     REPRODUCTIVE ORGANS: Within normal limits.     BOWEL: Partial gastrectomy and gastrojejunostomy. Extensive colonic  diverticulosis. Normal caliber large and small bowel. No abnormal  bowel wall thickening or enhancement. Appendix is unremarkable.     PERITONEUM/FLUID: No ascites or pelvic free fluid.     VESSELS: No aneurysmal dilatation of the abdominal aorta.  The portal,  splenic, and superior mesenteric veins are patent.  The origins of the  celiac and superior mesenteric arteries are patent.     LYMPH NODES: No lymphadenopathy.     BONES/SOFT TISSUES: No aggressive osseous lesions. Degenerative  changes in the spine. L1 hemangioma. Few chronic right rib fractures.  Stable small  circumscribed lucent focus in the sternum is likely  benign. Left hip arthroplasty. Left SI joint fixation hardware.  Changes of right inguinal hernia repair.                                                                       IMPRESSION:   No evidence of metastatic disease in the chest, abdomen, or pelvis.     CHERYL SOLIZ MD            EXAM: MR SHOULDER RIGHT WITHOUT CONTRAST  LOCATION: [facility]  DATE: 11/18/2023     INDICATION: Shoulder pain.  COMPARISON: None.  TECHNIQUE: Unenhanced.     FINDINGS:     ROTATOR CUFF:  -Supraspinatus: Mild tendinosis without tearing. Normal muscle without strain or atrophy.  -Infraspinatus: No tendon tear, tendinopathy or fatty atrophy.  -Subscapularis: Mild tendinosis without tear or tendon thinning. Normal muscle without edema or atrophy.  -Teres minor: Normal tendon and muscle.     CORACOACROMIAL ARCH:  -Morphology: Developmental variant os acromiale. Small amount of fluid at the pseudoarticulation. No significant inflammatory changes or stress reaction. Type I acromial morphology. No subacromial spur. Subacromial and subcoracoid space are normal.   -Bursa: Mild subacromial/subdeltoid bursitis.     ACROMIOCLAVICULAR JOINT:   -Normal alignment. Minimal degenerative arthrosis.      LONG HEAD OF BICEPS TENDON:   -Small tenosynovial effusion. Normal tendon without tear or tendinosis.     GLENOHUMERAL JOINT:   -Labrum: Degenerative surface fraying and blunting of the labrum. No full-thickness tear/avulsion.   -Cartilage: Smooth high-grade cartilage loss, with full-thickness thinning over the glenoid, and mixed high-grade partial-thickness and full-thickness thinning over the humeral head.  -Joint space: Minimally increased joint fluid. Mild synovitis. No loose intra-articular bodies.  -Glenohumeral ligaments and capsule: No pericapsular inflammation.     BONES:   -Negative for fracture, AVN, or bone marrow replacement lesion.  -End-stage degenerative  arthritic changes, with marginal osteophytes.     SOFT TISSUES:   -Normal deltoid muscle bulk. Normal visualized chest wall and axilla.                                                                      IMPRESSION:  1.  Advanced osteoarthrosis, with high-grade cartilage loss, including areas of exposed subchondral bone over the glenoid and humeral head. Large marginal osteophytes.     2.  Minimally increased glenohumeral joint fluid. Mild synovitis. No loose bodies.     3.  Intact rotator cuff without full-thickness or partial-thickness tear. Mild supraspinatus and subscapularis tendinosis.     4.  Mild subacromial/subdeltoid bursitis.     5.  Small biceps tenosynovial effusion. No tendon tearing or significant tendinosis.     6.  Incidentally identified os acromiale developmental variant morphology.      Narrative & Impression   EXAM: MR LUMBAR SPINE W/O CONTRAST  LOCATION: [facility]  DATE: 9/3/2023     INDICATION: History of lumbar lami (+10yrs), primarily axial LBP>BLE radicular pain.  COMPARISON: Plain film imaging 08/29/2023 of the lumbar spine, prior body CT 08/30/2023 also reviewed.  TECHNIQUE: Routine Lumbar Spine MRI without IV contrast.     FINDINGS:   Nomenclature is based on 5 lumbar type vertebral bodies. Rudimentary 12th ribs are identified. Partial sacralization left L5. Correlate with plain film imaging if there is future plan for intervention. Satisfactory lumbar vertebral body height. 2 mm   degenerative anterior subluxation L3-L4 as on prior body CT. No high-grade subluxations. No marrow or ligamentous edema. Nothing for sacral insufficiency or stress fracture. Nothing for a marrow-infiltrative process. Benign vertebral body hemangioma at   L1. Interspace narrowing lower thoracic level and at L3-L4. Congenital narrowing of the L5-S1 interspace. Left SI joint space fusion hardware with degenerative changes right SI joint. Sacral neural foramina are intact. Normal distal  spinal cord and cauda   equina with conus medullaris at L1. Rightward lumbar curve. Nerve roots of the cauda equina are satisfactory without nodularity thickening. No retroperitoneal solid mass or adenopathy. Symmetric psoas appearance. Normal caliber abdominal aorta.   Satisfactory renal contours.     T12-L1: Moderate loss of disc height with annular bulge. No disc herniation, spinal stenosis or foraminal narrowing is present. Facet joints are satisfactory.      L1-L2: Normal disc height and signal. Mild annular bulge. No herniation. Normal facets. No spinal canal or neural foraminal stenosis.     L2-L3: Mild loss of disc height with annular bulge. No disc herniation. No spinal stenosis. Mild foraminal narrowing bilaterally. Mild facet joint hypertrophic changes are present.      L3-L4: Mild loss of disc height. Low-grade degenerative anterior subluxation. Generalized disc bulge. No disc herniation. Mild foraminal narrowing inferiorly. No spinal stenosis. Mild facet joint arthropathy bilaterally.     L4-L5: Normal disc height and signal. Annular bulge. No herniation. Normal facets. No spinal canal or right neural foraminal stenosis. Mild left foraminal narrowing.     L5-S1: Partial sacralization on the left. Interspace narrowing on a congenital/developmental basis with disc desiccation. No disc herniation. No spinal stenosis or foraminal narrowing.                                                                      IMPRESSION:  1.  See above for counting nomenclature, stable from prior body CT examination 08/30/2023 with partial sacralization left L5. Correlate with plain film imaging if there is future plan for intervention.     2.  Low-grade degenerative anterior subluxation L3 upon L4. No high-grade subluxations. No marrow or ligamentous edema.     3.  No evidence for severe spinal stenosis or severe foraminal compromise at any lumbar level.     4.  See above for additional details and full level by level  description.         EXAM: XR SACRUM/COCCYX   LOCATION:  [facility]   DATE/TIME: 6/14/2021 10:39 AM     INDICATION: Tailbone pain for a year, history of adenocarcinoma of the stomach   COMPARISON: None.     IMPRESSION: Postsurgical change with cage at the left SI joint. Left hip arthroplasty. No fracture or malalignment. No osseous lesion. L5-S1 degenerative disc changes with facet arthrosis. Right hip is intact.     EXAM: XR SHOULDER RIGHT G/E 3 VIEWS  LOCATION: [facility]  DATE/TIME: 9/18/2022 12:10 PM     INDICATION: Pain following fall.  COMPARISON: None.                                                                      IMPRESSION: No fractures are identified. Normal glenohumeral alignment. Mild degenerative changes in the glenohumeral joint. The acromioclavicular joint is unremarkable. Right-sided port catheter.      Narrative & Impression   CT CERVICAL SPINE W/O CONTRAST 9/18/2022 12:14 PM     Provided History: neck pain post fall     Comparison: CT chest and pelvis 11/15/2021     Technique: Using multidetector thin collimation helical acquisition  technique, axial, coronal and sagittal CT images through the cervical  spine were obtained without intravenous contrast.      Findings:     The cervical vertebrae are normally aligned. Straightening of the  cervical curvature.      No acute fracture or traumatic subluxation. No prevertebral edema.      There is multilevel disc height narrowing throughout. Multilevel  cervical spondylosis with disc osteophyte complexes, uncinate  spurring, and facet arthropathy. Mild spinal canal narrowing at C4-5  and C5-6. Mild to moderate bilateral neural foraminal narrowing at  C4-5, mild to moderate right and mild left neural stenosis at C5-6.     No abnormality of the paraspinous soft tissues.                                                                      Impression:   1. No acute fracture or traumatic  subluxation.  2. Multilevel cervical spondylosis, with minimal spinal canal  narrowing at C4-C5 and C5-C6.     I have personally reviewed the examination and initial interpretation  and I agree with the findings.       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  No further appts with Dr. Urbina.  Plan:             (M62.838) Muscle spasm  Comment:   Plan: Continue cyclobenzaprine and stretching      (F41.9,  F32.A) Anxiety and depression  Comment:   Plan: continue celexa, consider pain psychology in the future.    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              Plan on 11/17/2023:  Interventions:  JONATHON with Dr. Gallegos      Medications:    She is requesting refills of cyclobenzaprine and zofran due to recent migraines.  Discontinue compounding medication.    Amitriptyline 75mg q hs   Celexa 20mg 1.5 tabs daily 30mg daily  Cyclobenzaprine 10 mg TID PRN  Hydroxyzine 25mg 1-2 tabs QID anxiety  Zofran 8mg prn nausea with migraines  Oxycodone 5mg TID - last dose this am.    Stool softeners PRN  Imitrex 100mg PRN HA - per PCP    UDS and opioid agreement signed on 11/03/2023.      Follow up:   Follow-up in clinic in 2 wks after JONATHON with Dr. Gallegos.        Desi Amaya, CNP          BILLING TIME DOCUMENTATION:   The total TIME spent on this  patient on the date of the encounter/appointment was 40 minutes.

## 2024-06-07 ENCOUNTER — OFFICE VISIT (OUTPATIENT)
Dept: PALLIATIVE MEDICINE | Facility: OTHER | Age: 59
End: 2024-06-07
Attending: NURSE PRACTITIONER
Payer: MEDICARE

## 2024-06-07 VITALS
OXYGEN SATURATION: 98 % | WEIGHT: 169 LBS | BODY MASS INDEX: 25.7 KG/M2 | HEART RATE: 107 BPM | DIASTOLIC BLOOD PRESSURE: 65 MMHG | SYSTOLIC BLOOD PRESSURE: 119 MMHG

## 2024-06-07 DIAGNOSIS — M25.511 CHRONIC RIGHT SHOULDER PAIN: ICD-10-CM

## 2024-06-07 DIAGNOSIS — R11.0 NAUSEA: ICD-10-CM

## 2024-06-07 DIAGNOSIS — M62.838 MUSCLE SPASM: ICD-10-CM

## 2024-06-07 DIAGNOSIS — M54.12 CERVICAL RADICULOPATHY, CHRONIC: ICD-10-CM

## 2024-06-07 DIAGNOSIS — G89.29 CHRONIC RIGHT SHOULDER PAIN: ICD-10-CM

## 2024-06-07 DIAGNOSIS — F11.90 CHRONIC, CONTINUOUS USE OF OPIOIDS: ICD-10-CM

## 2024-06-07 DIAGNOSIS — M47.12 CERVICAL SPONDYLOSIS WITH MYELOPATHY: Primary | ICD-10-CM

## 2024-06-07 PROCEDURE — G2211 COMPLEX E/M VISIT ADD ON: HCPCS | Performed by: NURSE PRACTITIONER

## 2024-06-07 PROCEDURE — 99215 OFFICE O/P EST HI 40 MIN: CPT | Performed by: NURSE PRACTITIONER

## 2024-06-07 PROCEDURE — G0463 HOSPITAL OUTPT CLINIC VISIT: HCPCS | Performed by: NURSE PRACTITIONER

## 2024-06-07 RX ORDER — CYCLOBENZAPRINE HCL 10 MG
10 TABLET ORAL 3 TIMES DAILY PRN
Qty: 90 TABLET | Refills: 3 | Status: SHIPPED | OUTPATIENT
Start: 2024-06-07

## 2024-06-07 RX ORDER — ONDANSETRON 8 MG/1
8 TABLET, FILM COATED ORAL EVERY 8 HOURS PRN
Qty: 90 TABLET | Refills: 2 | Status: SHIPPED | OUTPATIENT
Start: 2024-06-07

## 2024-06-07 ASSESSMENT — PAIN SCALES - GENERAL: PAINLEVEL: EXTREME PAIN (8)

## 2024-06-07 NOTE — PROGRESS NOTES
Patient presents to the clinic today for a visit with NINA Lott CNP regarding Pain Management.          12/18/2023    10:16 AM 3/15/2024     2:01 PM 6/7/2024    11:30 AM   PEG Score   PEG Total Score 4.33 6.67 7.33       UDS/CSA- 11.03.2023    Medications- Oxy this am about 7    Notes    Stephanie Russell  [facility] Clinical Assistant

## 2024-06-10 ENCOUNTER — TELEPHONE (OUTPATIENT)
Dept: PALLIATIVE MEDICINE | Facility: OTHER | Age: 59
End: 2024-06-10
Payer: MEDICARE

## 2024-06-10 DIAGNOSIS — M54.12 CERVICAL RADICULOPATHY: Primary | ICD-10-CM

## 2024-06-13 ENCOUNTER — LAB (OUTPATIENT)
Dept: LAB | Facility: HOSPITAL | Age: 59
End: 2024-06-13
Payer: MEDICARE

## 2024-06-13 DIAGNOSIS — N39.0 RECURRENT UTI: Primary | ICD-10-CM

## 2024-06-13 DIAGNOSIS — N39.0 RECURRENT UTI: ICD-10-CM

## 2024-06-13 LAB
ALBUMIN UR-MCNC: NEGATIVE MG/DL
APPEARANCE UR: ABNORMAL
BACTERIA #/AREA URNS HPF: ABNORMAL /HPF
BILIRUB UR QL STRIP: NEGATIVE
COLOR UR AUTO: YELLOW
GLUCOSE UR STRIP-MCNC: NEGATIVE MG/DL
HGB UR QL STRIP: ABNORMAL
HYALINE CASTS: 4 /LPF
KETONES UR STRIP-MCNC: NEGATIVE MG/DL
LEUKOCYTE ESTERASE UR QL STRIP: ABNORMAL
NITRATE UR QL: NEGATIVE
PH UR STRIP: 6.5 [PH] (ref 5–7)
RBC URINE: 6 /HPF
SP GR UR STRIP: 1.01 (ref 1–1.03)
SQUAMOUS EPITHELIAL: 2 /HPF
UROBILINOGEN UR STRIP-MCNC: <2 MG/DL
WBC CLUMPS #/AREA URNS HPF: PRESENT /HPF
WBC URINE: >182 /HPF

## 2024-06-13 PROCEDURE — 81001 URINALYSIS AUTO W/SCOPE: CPT

## 2024-06-13 PROCEDURE — 87086 URINE CULTURE/COLONY COUNT: CPT

## 2024-06-13 PROCEDURE — 87186 SC STD MICRODIL/AGAR DIL: CPT

## 2024-06-13 RX ORDER — NITROFURANTOIN 25; 75 MG/1; MG/1
100 CAPSULE ORAL 2 TIMES DAILY
Qty: 10 CAPSULE | Refills: 0 | Status: SHIPPED | OUTPATIENT
Start: 2024-06-13 | End: 2024-06-18

## 2024-06-13 NOTE — PROGRESS NOTES
Call placed to patient to assess her symptoms. She reports: lower back pain, urinary frequency, odor to her urine, cloudy urine and bloating. She denies fever, but does endorse some chills yesterday. Orders placed for UA/UC. Instructed her to be watchful for temp of greater than 101.5 or less than 96.8 or any other symptoms of infection. She will call [phone] to set up a lab appointment today.     Thank you,  Ellen Jackson RN, BSN Urology Triage

## 2024-06-13 NOTE — PROGRESS NOTES
Call placed to patient to let her know that her urinalysis  does have some infection markers in it. Nitrofurantoin 100 mg po BID for 5 days ordered and sent to patient's pharmacy of choice. She voices understanding and will  and start today.    Thank you,  Ellen Jackson RN, BSN Urology Triage

## 2024-06-15 LAB
BACTERIA UR CULT: ABNORMAL
BACTERIA UR CULT: ABNORMAL

## 2024-06-17 DIAGNOSIS — N39.0 RECURRENT UTI: Primary | ICD-10-CM

## 2024-06-17 RX ORDER — CIPROFLOXACIN 500 MG/1
500 TABLET, FILM COATED ORAL 2 TIMES DAILY
Qty: 6 TABLET | Refills: 0 | Status: SHIPPED | OUTPATIENT
Start: 2024-06-17 | End: 2024-06-20

## 2024-06-17 NOTE — RESULT ENCOUNTER NOTE
Patient states fatigue all weekend and green urine, so cipro order placed and sent to pharmacy of choice.    Thank you,  Ellen Jackson RN, BSN Urology Triage

## 2024-06-22 ENCOUNTER — HEALTH MAINTENANCE LETTER (OUTPATIENT)
Age: 59
End: 2024-06-22

## 2024-06-24 ENCOUNTER — OFFICE VISIT (OUTPATIENT)
Dept: PALLIATIVE MEDICINE | Facility: OTHER | Age: 59
End: 2024-06-24
Attending: NURSE PRACTITIONER
Payer: MEDICARE

## 2024-06-24 VITALS
OXYGEN SATURATION: 100 % | SYSTOLIC BLOOD PRESSURE: 113 MMHG | BODY MASS INDEX: 24.33 KG/M2 | WEIGHT: 160 LBS | DIASTOLIC BLOOD PRESSURE: 66 MMHG | HEART RATE: 100 BPM

## 2024-06-24 DIAGNOSIS — M54.12 CERVICAL RADICULOPATHY, CHRONIC: Primary | ICD-10-CM

## 2024-06-24 DIAGNOSIS — F11.90 CHRONIC, CONTINUOUS USE OF OPIOIDS: ICD-10-CM

## 2024-06-24 DIAGNOSIS — G89.29 CHRONIC INTRACTABLE PAIN: ICD-10-CM

## 2024-06-24 DIAGNOSIS — M47.12 CERVICAL SPONDYLOSIS WITH MYELOPATHY: ICD-10-CM

## 2024-06-24 PROCEDURE — G0463 HOSPITAL OUTPT CLINIC VISIT: HCPCS | Performed by: NURSE PRACTITIONER

## 2024-06-24 PROCEDURE — 99214 OFFICE O/P EST MOD 30 MIN: CPT | Performed by: NURSE PRACTITIONER

## 2024-06-24 RX ORDER — HYDROCODONE BITARTRATE AND ACETAMINOPHEN 5; 325 MG/1; MG/1
1 TABLET ORAL 3 TIMES DAILY
Qty: 75 TABLET | Refills: 0 | Status: SHIPPED | OUTPATIENT
Start: 2024-06-24 | End: 2024-07-19

## 2024-06-24 ASSESSMENT — PATIENT HEALTH QUESTIONNAIRE - PHQ9: SUM OF ALL RESPONSES TO PHQ QUESTIONS 1-9: 5

## 2024-06-24 ASSESSMENT — PAIN SCALES - GENERAL: PAINLEVEL: EXTREME PAIN (8)

## 2024-06-24 NOTE — PROGRESS NOTES
Date:06/24/2024      COMPREHENSIVE PAIN CLINIC FOLLOW UP EVALUATION    Ms. Nathalie Bashir on 11/3/2023 in the [facility] per request of Dr. Dobbs with regards to her pain.  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  UDS and opioid agreement singed and UDS appropriate on 11/03/2023.  Nathalie Bashir has been seen at a pain clinic in the past in [place] 10 years ago, [facility] in 1990s and [facility].  UDS and opioid agreement signed on 11/03/2023.      Updates since last appointment on 06/07/2024   [place] broke and had it repaired and developed dry socket.     She accidentally over used her oxycodone and ran out last Saturday night - last filled on 06/06/2024.  Discussed opioid agreement and she cannot self escalate the opioids for acute dental pain.  She acknowledged understanding.  If there is another violation of her opioid agreement  will have to taper off C2 opioids.    neck pain L=R on left radiating down b/l arms.  She is not sleeping well.  She had JONATHON many years ago at [facility] which decreased her pain by at least 50% for 6 months.. She did PT in 04/2024 but she continues to do HEP.  Ice is helpful.    Started compounding cream with ketamine 8% and gabapentin 8% to neck, shoulder, feet and started triggering migraines so she stopped the medication.        Interventions/Injections:   1/18/24 R) shoulder injection with Dr Gallegos: approx 65% improvement, also going to PT which is helpful.      Progress Notes Reviewed:  04/27/2024 ED - APONET  04/19/2024 Dr. Apurva Dobbs PM&R  04/11/2024 PT - R) shoulder pain  04/01/2024 ED - APONTE  03/15/2024 Sue Herrera, Pain Clinic    Any hospitalizations/ER/UC visits since last appointment:  no  Any falls/accidents since  last appointment:  no      Primary Pain :  Neck and right shoulder      Characteristics:  No changes in pain characteristics since last appointment.    What makes the pain better:  Her pain is improved with laying down, rest.    What makes the pain worse:   She reports that the pain is made worse by moving her head, using her arms, ADL's.  06/07/2024 current pain on 0/10 VAS:   7    06/07/2024 current pain on 0/10 VAS:   7          03/15/2024 current pain on 0/10 VAS:   6         Current Pain Related Medications:  Any medications changes since last appointment: no    Amitriptyline 75mg q hs   Celexa 20mg 1.5 tabs daily 30mg daily  Compounding medication - stopped due to migraines  Cyclobenzaprine 10 mg TID PRN - needs refills today  Hydroxyzine 25mg 1-2 tabs QID anxiety  Zofran 8mg prn nausea with migraines - needs refills today for migraines  Oxycodone 5mg TID - last dose this am.  Last script per  05/07/2024.  Stool softeners PRN  Imitrex 100mg PRN HA - per PCP      Interventions:  JONATHON is scheduled 07/10/2024  She would like to repeat R) shoulder injection in the future      Therapies discuss on initial consult:   Physical therapy, Pain Psychology, TENs unit, Grounding Mat, Frequency Specific Micro Current, Anti-inflammatory Lifestyle    Social:    She is  and  lives in a house.  She has 1 grown son and 4 grandchildren.  She is independent in ADL's.      Employment: She is on SSD.    Exercise:  Last PT was 2018.  Patient exercises with stretching at home. Starting PT on Monday.    Hobbies:  She enjoys her grandchildren.    Mental Health:  Patient reports depression is under control on citalopram 30mg.  Patient does not follow with a mental health care provider.            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  Stomach cancer was treated with gastrectomy and chemotherapy.  Doctors found stomach cancer when they did Whipple Surgery 01/2020 at [facility].  Patient has numbness and tingling in legs in the stocking foot distribution and b/l hands.  Patient has lumbar pain and has had lumbar MBB x 1 with Dr. Urbina.  Patient has not had any spine surgery in the past.  The patient describes the pain as constant, stabbing, burning and numbness.  She reports that the pain is made worse by prolong standing, ADL's.  Her pain is improved with laying down, rest.  She rates her currenty pain score at 7/10, but it can be as low as 3/10 or as severe as 9/10. Patient follows with Dr. Urbina for lumbar intervention options.  Rheumatology could not find any cause for her joint pain.      Progress Notes Reviewed:  10/19/2023 Dr. Dobbs, PM&R - order quad cane and compression stockings, follow-up with Dr. Urbina for low back pain  10/17/2023 Dr. Mckoy, Rheumatology  10/01/2023 UC - UTI  09/27/2023 Dr. Urbina - lumbar MBB L4-5 and L5-S1  09/12/2023 Dr. Urbina    Patient reports depression is under control on citalopram 30mg.  Patient does not follow with a mental health care provider.  Last PT was 2018.  Patient exercises with stretching at home.    She denies any new problems with falls or balance, any new numbness or weakness of the arms or legs, any new bowel or bladder incontinence, any night sweats or unexplained fevers, or any sudden or unexpected weight loss.      Nathalie Bashir has been seen at a pain clinic in the past in [place] 10 years ago, [facility] in 1990s and  [facility].      Current Treatments:  Amitriptyline 75mg q hs - she ran out of this medication for sleep.  Not helpful for neuropathic pain.  Celexa 20mg 1.5 tabs daily 30mg daily  Compounding medication  Cyclobenzaprine 10 mg TID PRN  Hydroxyzine 25mg 1-2 tabs QID anxiety  Zofran 8mg prn nausea with migraines  Oxycodone 5mg  TID - since 2020 with palliative care  Stool softeners PRN  Imitrex 100mg PRN HA    NOTE:  allergies to naprosyn, methadone, latex, ketorlac, gabapentin, acetaminophen, topiramate, pregabalin and oxycontin    Previous Medication Treatments Included:  Anti-convulsants: Gabapentin, pregabalin had negative SE  Muscle relaxors: Tizanidine not helpful  Anti-depressants: amitriptyline was not helpful for pain, duloxetine had negative SE with mood  Benzodiazapine's: none  Acetaminophen/NSAIDs: negative SE  Topicals: Voltaren gel 1%  Headache abortives: imitrex  Headache prophylactics:   Opioids: MS ER 30mg BID, Oxycodone 10mg BID at previous pain - [facility] in 1990, buprenorphine caused negative SE      Other Treatments Have Included:  Physical therapy: yes over 2 years ago  Pain Psychology: no  Chiropractic: yes - not helpful  Acupuncture: yes - not helpful  TENs Unit: yes she has one  Injections: 09/27/2023 lumbar MBB #1  Surgeries: L) hip replacement 2015, L) SI fusion 2008  Dry Needling: no  Massage:no    Past Medical History:  Medical history reviewed.  Past Medical History:   Diagnosis Date    Allergic rhinitis     Cancer (H)     stomach cancer    Chronic pain     Closed fracture of distal end of left radius     Depression     Gastric adenocarcinoma (H)     Lumbago     Migraine     Opioid dependence (H)     Other chronic pain     low back    Sacroiliitis (H24)     low back pain    Trochanteric bursitis     leg length discrrepancy, hip pain      Patient Active Problem List   Diagnosis    Lower limb length difference    IPMN (intraductal papillary mucinous neoplasm)    Allergic rhinitis    Arthralgia of hip    Bursitis    Chronic neck pain    Chronic pain disorder    Continuous opioid dependence (H)    Controlled substance agreement signed    Debility    Degeneration of intervertebral disc of lumbosacral region    Depression    Disorder of sacrum    Greater trochanteric bursitis of left hip    Infected prosthesis of left  hip (H24)    Chronic bilateral low back pain without sciatica    MDD (major depressive disorder), recurrent, in partial remission (H24)    Migraine with aura    Sacroiliitis (H24)    Screening for malignant neoplasm of cervix    Spondylosis of lumbosacral spine without myelopathy    Status post left hip replacement    Cervical radiculopathy, chronic    Unilateral inguinal hernia without obstruction or gangrene    Long term (current) use of opiate analgesic    Low urine output    Hypophosphatemia    Leukocytosis    Intra-abdominal fluid collection    Bacteremia due to Gram-negative bacteria    Malnutrition (H24)    Gastric adenocarcinoma (H)    Pneumonia    Mucositis    Chemotherapy-induced neutropenia (H24)    Gastric cancer (H)    Cholangitis (H28)    Thrombocytopenia (H24)    Urinary tract infection    Crohn's disease of small intestine (H)    Jejunitis    Colitis    Abdominal pain    Malignant neoplasm of overlapping sites of stomach (H)    Iron deficiency anemia    Anemia    Paralytic ileus (H)    Hypokalemia    Nausea & vomiting    Dysphagia    History of gastric surgery    Chemotherapy-induced neuropathy (H24)    Bilateral hand pain    Pyelonephritis    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  Past Surgical History:   Procedure Laterality Date    ARTHROPLASTY HIP Left 2016    BACK SURGERY      L4-5 decompression    CATARACT IOL, RT/LT      CHOLECYSTECTOMY N/A 1/28/2020    Procedure: Cholecystectomy;  Surgeon: Yandel Mallory MD;  Location: [facility] OR    ENDOSCOPIC RETROGRADE CHOLANGIOPANCREATOGRAM N/A 7/27/2020    Procedure: ENDOSCOPIC RETROGRADE CHOLANGIOPANCREATOGRAPHY  **Latex Allergy** with jejunal biopsies;  Surgeon: Jeet Aviles MD;  Location: [facility] OR     ESOPHAGOSCOPY, GASTROSCOPY, DUODENOSCOPY (EGD), COMBINED N/A 3/3/2020    Procedure: ESOPHAGOGASTRODUODENOSCOPY, WITH ENDOSCOPIC US (enoxaparin);  Surgeon: Bakari Plata MD;  Location: [facility] GI    ESOPHAGOSCOPY, GASTROSCOPY, DUODENOSCOPY (EGD), COMBINED N/A 12/7/2020    Procedure: Upper Endoscopy with stent placement;  Surgeon: Jeet Aviles MD;  Location:  OR    ESOPHAGOSCOPY, GASTROSCOPY, DUODENOSCOPY (EGD), COMBINED N/A 2/8/2021    Procedure: ESOPHAGOGASTRODUODENOSCOPY (EGD)AND STENT REMOVAL;  Surgeon: Jeet Aviles MD;  Location:  OR    EYE SURGERY      GASTRECTOMY N/A 10/21/2020    Procedure: Open subtotal gastectomy with David en Y Reconstruction; D1 Lymph Node Disection  **Latex Allergy**;  Surgeon: Yandel Mallory MD;  Location: [facility] OR    INJECT BLOCK MEDIAL BRANCH CERVICAL/THORACIC/LUMBAR Bilateral 9/27/2023    Procedure: BILATERAL L4-5, L5-S1 medial branch block #1;  Surgeon: Maria Victoria Urbina MD;  Location: [facility] OR    IR CHEST PORT PLACEMENT > 5 YRS OF AGE  3/26/2020    IR LUMBAR EPIDURAL STEROID INJECTION  8/25/2009    IR LUMBAR EPIDURAL STEROID INJECTION  9/2/2009    IR LUMBAR EPIDURAL STEROID INJECTION  9/25/2009    IR LUMBAR EPIDURAL STEROID INJECTION  11/23/2009    IR LUMBAR EPIDURAL STEROID INJECTION  4/1/2010    IR LUMBAR EPIDURAL STEROID INJECTION  9/1/2010    IR LUMBAR EPIDURAL STEROID INJECTION  3/10/2011    IR LUMBAR EPIDURAL STEROID INJECTION  8/30/2011    IR PICC PLACEMENT > 5 YRS OF AGE  10/12/2016    IR PORT REMOVAL RIGHT  12/8/2023    LAPAROSCOPY DIAGNOSTIC (GENERAL) N/A 10/13/2020    Procedure: Diagnostic laparoscopy with peritoneal washings  **Latex Allergy**;  Surgeon: Yandel Mallory MD;  Location: [facility] OR    OPEN REDUCTION INTERNAL FIXATION RODDING INTRAMEDULLARY FEMUR Left 12/23/2014    Procedure: OPEN REDUCTION INTERNAL FIXATION RODDING INTRAMEDULLARY FEMUR;  Surgeon: Arnol Santiago MD;  [facility]: [facility] OR    OPEN REDUCTION INTERNAL FIXATION WRIST Left  9/22/2022    Procedure: LEFT WRIST OPEN REDUCTION INTERNAL FIXATION, FRACTURE;  Surgeon: Mark Villalobos MD;  Location: [facility] OR    ORTHOPEDIC SURGERY      PICC DOUBLE LUMEN PLACEMENT Right 02/07/2020    5 fr double lumen 41 cm    VA LAMINEC/FACETECT/FORAMIN,LUMBAR 1 SEG      Description: Laminectomy Decompress, Facetectomy, Foraminotomy Lumbar Seg;  Recorded: 04/12/2012;    REMOVE HARDWARE LOWER EXTREMITY Left 4/7/2015    Procedure: REMOVE HARDWARE LOWER EXTREMITY;  Surgeon: Arnol Santiago MD;  [facility]    REMOVE HARDWARE RODDING INTRAMEDULLARY FEMUR Left 12/17/2015    Procedure: REMOVE HARDWARE RODDING INTRAMEDULLARY FEMUR;  Surgeon: Arnol Santiago MD;  [facility]    RESECT BONE LOWER EXTREMITY Left 12/23/2014    Procedure: RESECT BONE LOWER EXTREMITY;  Surgeon: Arnol Santiago MD;  [facility]    SHORTENING OSTEOPLASTY FEMUR W/ IM NAILING      WHIPPLE PROCEDURE N/A 1/28/2020    Procedure: Open Whipple procedure and Cholecystectomy;  Surgeon: Yandel Mallory MD;  Location: [facility] OR    WHIPPLE PROCEDURE      ZZC FUSION OF SACROILIAC JOINT      Description: Arthrodesis Sacroiliac Joint Left;  Recorded: 10/07/2011;    ZZC REPAIR DETACH RETINA,SCLERAL BUCKLE          Medications: Pertinent medications reviewed.  Current Outpatient Medications   Medication Sig Dispense Refill    amitriptyline (ELAVIL) 75 MG tablet Take 1 tablet (75 mg) by mouth at bedtime 30 tablet 3    citalopram (CELEXA) 20 MG tablet Take 1.5 tablets (30 mg) by mouth daily 135 tablet 1    Cyanocobalamin 1000 MCG CAPS Take 1 tablet by mouth every morning      cyclobenzaprine (FLEXERIL) 10 MG tablet Take 1 tablet (10 mg) by mouth 3 times daily as needed for muscle spasms 90 tablet 3    famotidine (PEPCID) 20 MG tablet TAKE 1 TABLET (20 MG) BY MOUTH 2 TIMES DAILY AS NEEDED 180 tablet 1    fluticasone (FLONASE) 50 MCG/ACT nasal spray Spray 1 spray into both nostrils daily at 2 pm      hydrOXYzine (VISTARIL) 25 MG capsule Take 25-50 mg  by mouth 4 times daily as needed for itching or anxiety      loratadine-pseudoePHEDrine (CLARITIN-D 24-HOUR)  MG 24 hr tablet Take 1 tablet by mouth daily as needed for allergies Usually takes in Spring/Fall      ondansetron (ZOFRAN) 8 MG tablet Take 1 tablet (8 mg) by mouth every 8 hours as needed (Nausea/Vomiting) 90 tablet 2    oxyCODONE (ROXICODONE) 5 MG tablet Take 1 tablet (5 mg) by mouth 3 times daily as needed for breakthrough pain or moderate to severe pain Maximum 3 pills/day. Dispense 06/06/24 for start 06/08/24 90 tablet 0    pantoprazole (PROTONIX) 40 MG EC tablet Take 1 tablet (40 mg) by mouth 2 times daily 180 tablet 1    sennosides (SENOKOT) 8.6 MG tablet 1-2 tablets 1-2 times daily as needed. 120 tablet 1    simethicone 80 MG TABS Take 1 tablet by mouth every 6 hours as needed (bloating, gas, belching) 90 tablet 3    SUMAtriptan (IMITREX) 100 MG tablet Take 100 mg by mouth at onset of headache for migraine      COMPOUNDED NON-CONTROLLED SUBSTANCE (CMPD RX) - PHARMACY TO MIX COMPOUNDED MEDICATION Estriol 1 mg/gram,place 1 gram vaginally three times per week,42.5 grams (Patient not taking: Reported on 6/24/2024) 1 each 3    loperamide (IMODIUM) 2 MG capsule Take 4 mg by mouth 4 times daily as needed for diarrhea (Patient not taking: Reported on 6/24/2024)         [place] Prescription Monitoring Program reviewed 06/24/2024.  No concern for abuse or misuse of controlled medications based on this report.  06/06/2024 Oxycodone 5mg 90 tabs for 30 days  05/07/2024 Oxycodone 5mg 90 tabs for 30 days  04/07/2024 Oxycodone 5mg 90 tabs for 30 days  Monthly scripts for oxycodone in 2024 no other medications listed.    Allergies: Pertinent allergies reviewed.     Allergies   Allergen Reactions    Gluten Meal Palpitations    Amoxicillin-Pot Clavulanate Rash     Patient tolerated Zosyn in 7/2020    Oxycontin [Oxycodone] Other (See Comments)     Confusion, loopy, oxycodone is tolerated.    Pt states she tolerates  regular oxycodone, cannot take 12 hour oxycontin.      Pregabalin      interfered with Cymbalta    Topiramate      Tongue numbness, taste alteration, irritable     Acetaminophen Nausea     Urine retention      Dust Mite Extract     Gabapentin Dizziness and GI Disturbance    Ketorolac Headache    Latex Rash    Methadone Fatigue    Mold     Naprosyn [Naproxen] Dizziness and GI Disturbance    Seasonal Allergies        Family History:   family history includes Dementia in her father; Heart Failure (age of onset: 77) in her mother; No Known Problems in her brother and sister.    Social History:   She is  and  lives in a house.  She has 1 grown child.  She is on SSD.  She is independent in ADL's.  She has 4 grand children who live in [place].  She  reports that she quit smoking about 4 years ago. Her smoking use included cigarettes. She started smoking about 29 years ago. She has a 12.5 pack-year smoking history. She has been exposed to tobacco smoke. She has never used smokeless tobacco. She reports that she does not drink alcohol and does not use drugs.  Social History     Social History Narrative    Not on file       Physical Exam:  /66   Pulse 100   Wt 72.6 kg (160 lb)   LMP 09/23/2014   SpO2 100%   BMI 24.33 kg/m      Constitutional: She is oriented to person, place, and time.  She appears well-developed and well-nourished. She is not in acute distress.   HENT:     Head: Normocephalic and atraumatic.     Eyes: Pupils are equal, round, and reactive to light. EOM are normal. No scleral icterus.   Pulmonary/Chest:  NWOB. No respiratory distress.   Neurological: She is alert and oriented to person, place, and time. Coordination grossly normal.  Skin: Skin is warm and dry. She is not diaphoretic.   Psychiatric: She has a normal mood and affect. Her behavior is normal. Judgment and thought content normal.  Patient answers questions appropriately.  MSK: Gait is antalgic.    Cervical:  ROM is moderately  painful in all planes and unrestricted.  Sensation intact to upper extremities.  Strength 5/5 in upper extremities.  Spurling's test is negative.    Narrative & Impression   EXAMINATION: CT CHEST/ABDOMEN/PELVIS W CONTRAST, 11/13/2023 12:03 PM     TECHNIQUE:  Helical CT images from the thoracic inlet through the  symphysis pubis were obtained with IV contrast. Contrast dose: Isovue  370 88cc     COMPARISON: CT abdomen and pelvis dated 8/30/2023, CT chest abdomen  pelvis dated 5/17/2023.     HISTORY: Malignant neoplasm of pyloric antrum (H)     FINDINGS:  CHEST:  LUNGS: Central tracheobronchial tree is patent. No pneumothorax or  pleural effusion. No focal airspace opacity. No suspicious pulmonary  nodule. Unchanged subcentimeter nodule along the left fissure likely  represents an intrafissural lymph node. Stable few sub-4 mm pulmonary  nodules are likely benign.      MEDIASTINUM: Right port catheter terminates in distal SVC. Heart size  is within normal limits. No pericardial effusion. Ascending aorta and  main pulmonary artery diameters are within normal limits. Normal  appearance and configuration of the great vessels off of the aortic  arch. No suspicious mediastinal, hilar, or axillary lymph nodes.      Visualized thyroid and esophagus are unremarkable.     ABDOMEN/PELVIS:  LIVER: No suspicious focal hepatic lesion.     BILIARY: Gallbladder is surgically absent. Unchanged mild pneumobilia.  No intrahepatic or extrahepatic biliary ductal dilation.     PANCREAS: Post Whipple's procedure. No focal pancreatic lesion. No  pancreatic duct dilatation.     SPLEEN: Within normal limits.     ADRENAL GLANDS: No focal adrenal nodule.     URINARY TRACT: No suspicious renal lesion. No hydronephrosis or  hydroureter. No renal stone. Urinary bladder is unremarkable.     REPRODUCTIVE ORGANS: Within normal limits.     BOWEL: Partial gastrectomy and gastrojejunostomy. Extensive colonic  diverticulosis. Normal caliber large and  small bowel. No abnormal  bowel wall thickening or enhancement. Appendix is unremarkable.     PERITONEUM/FLUID: No ascites or pelvic free fluid.     VESSELS: No aneurysmal dilatation of the abdominal aorta.  The portal,  splenic, and superior mesenteric veins are patent.  The origins of the  celiac and superior mesenteric arteries are patent.     LYMPH NODES: No lymphadenopathy.     BONES/SOFT TISSUES: No aggressive osseous lesions. Degenerative  changes in the spine. L1 hemangioma. Few chronic right rib fractures.  Stable small circumscribed lucent focus in the sternum is likely  benign. Left hip arthroplasty. Left SI joint fixation hardware.  Changes of right inguinal hernia repair.                                                                       IMPRESSION:   No evidence of metastatic disease in the chest, abdomen, or pelvis.     CHERYL SOLIZ MD            EXAM: MR SHOULDER RIGHT WITHOUT CONTRAST  LOCATION: [facility]  DATE: 11/18/2023     INDICATION: Shoulder pain.  COMPARISON: None.  TECHNIQUE: Unenhanced.     FINDINGS:     ROTATOR CUFF:  -Supraspinatus: Mild tendinosis without tearing. Normal muscle without strain or atrophy.  -Infraspinatus: No tendon tear, tendinopathy or fatty atrophy.  -Subscapularis: Mild tendinosis without tear or tendon thinning. Normal muscle without edema or atrophy.  -Teres minor: Normal tendon and muscle.     CORACOACROMIAL ARCH:  -Morphology: Developmental variant os acromiale. Small amount of fluid at the pseudoarticulation. No significant inflammatory changes or stress reaction. Type I acromial morphology. No subacromial spur. Subacromial and subcoracoid space are normal.   -Bursa: Mild subacromial/subdeltoid bursitis.     ACROMIOCLAVICULAR JOINT:   -Normal alignment. Minimal degenerative arthrosis.      LONG HEAD OF BICEPS TENDON:   -Small tenosynovial effusion. Normal tendon without tear or tendinosis.     GLENOHUMERAL JOINT:   -Labrum:  Degenerative surface fraying and blunting of the labrum. No full-thickness tear/avulsion.   -Cartilage: Smooth high-grade cartilage loss, with full-thickness thinning over the glenoid, and mixed high-grade partial-thickness and full-thickness thinning over the humeral head.  -Joint space: Minimally increased joint fluid. Mild synovitis. No loose intra-articular bodies.  -Glenohumeral ligaments and capsule: No pericapsular inflammation.     BONES:   -Negative for fracture, AVN, or bone marrow replacement lesion.  -End-stage degenerative arthritic changes, with marginal osteophytes.     SOFT TISSUES:   -Normal deltoid muscle bulk. Normal visualized chest wall and axilla.                                                                      IMPRESSION:  1.  Advanced osteoarthrosis, with high-grade cartilage loss, including areas of exposed subchondral bone over the glenoid and humeral head. Large marginal osteophytes.     2.  Minimally increased glenohumeral joint fluid. Mild synovitis. No loose bodies.     3.  Intact rotator cuff without full-thickness or partial-thickness tear. Mild supraspinatus and subscapularis tendinosis.     4.  Mild subacromial/subdeltoid bursitis.     5.  Small biceps tenosynovial effusion. No tendon tearing or significant tendinosis.     6.  Incidentally identified os acromiale developmental variant morphology.      Narrative & Impression   EXAM: MR LUMBAR SPINE W/O CONTRAST  LOCATION: [facility]  DATE: 9/3/2023     INDICATION: History of lumbar lami (+10yrs), primarily axial LBP>BLE radicular pain.  COMPARISON: Plain film imaging 08/29/2023 of the lumbar spine, prior body CT 08/30/2023 also reviewed.  TECHNIQUE: Routine Lumbar Spine MRI without IV contrast.     FINDINGS:   Nomenclature is based on 5 lumbar type vertebral bodies. Rudimentary 12th ribs are identified. Partial sacralization left L5. Correlate with plain film imaging if there is future plan for  intervention. Satisfactory lumbar vertebral body height. 2 mm   degenerative anterior subluxation L3-L4 as on prior body CT. No high-grade subluxations. No marrow or ligamentous edema. Nothing for sacral insufficiency or stress fracture. Nothing for a marrow-infiltrative process. Benign vertebral body hemangioma at   L1. Interspace narrowing lower thoracic level and at L3-L4. Congenital narrowing of the L5-S1 interspace. Left SI joint space fusion hardware with degenerative changes right SI joint. Sacral neural foramina are intact. Normal distal spinal cord and cauda   equina with conus medullaris at L1. Rightward lumbar curve. Nerve roots of the cauda equina are satisfactory without nodularity thickening. No retroperitoneal solid mass or adenopathy. Symmetric psoas appearance. Normal caliber abdominal aorta.   Satisfactory renal contours.     T12-L1: Moderate loss of disc height with annular bulge. No disc herniation, spinal stenosis or foraminal narrowing is present. Facet joints are satisfactory.      L1-L2: Normal disc height and signal. Mild annular bulge. No herniation. Normal facets. No spinal canal or neural foraminal stenosis.     L2-L3: Mild loss of disc height with annular bulge. No disc herniation. No spinal stenosis. Mild foraminal narrowing bilaterally. Mild facet joint hypertrophic changes are present.      L3-L4: Mild loss of disc height. Low-grade degenerative anterior subluxation. Generalized disc bulge. No disc herniation. Mild foraminal narrowing inferiorly. No spinal stenosis. Mild facet joint arthropathy bilaterally.     L4-L5: Normal disc height and signal. Annular bulge. No herniation. Normal facets. No spinal canal or right neural foraminal stenosis. Mild left foraminal narrowing.     L5-S1: Partial sacralization on the left. Interspace narrowing on a congenital/developmental basis with disc desiccation. No disc herniation. No spinal stenosis or foraminal narrowing.                                                                       IMPRESSION:  1.  See above for counting nomenclature, stable from prior body CT examination 08/30/2023 with partial sacralization left L5. Correlate with plain film imaging if there is future plan for intervention.     2.  Low-grade degenerative anterior subluxation L3 upon L4. No high-grade subluxations. No marrow or ligamentous edema.     3.  No evidence for severe spinal stenosis or severe foraminal compromise at any lumbar level.     4.  See above for additional details and full level by level description.         EXAM: XR SACRUM/COCCYX   [facility]   DATE/TIME: 6/14/2021 10:39 AM     INDICATION: Tailbone pain for a year, history of adenocarcinoma of the stomach   COMPARISON: None.     IMPRESSION: Postsurgical change with cage at the left SI joint. Left hip arthroplasty. No fracture or malalignment. No osseous lesion. L5-S1 degenerative disc changes with facet arthrosis. Right hip is intact.     EXAM: XR SHOULDER RIGHT G/E 3 VIEWS  LOCATION: [facility]  DATE/TIME: 9/18/2022 12:10 PM     INDICATION: Pain following fall.  COMPARISON: None.                                                                      IMPRESSION: No fractures are identified. Normal glenohumeral alignment. Mild degenerative changes in the glenohumeral joint. The acromioclavicular joint is unremarkable. Right-sided port catheter.      Narrative & Impression   CT CERVICAL SPINE W/O CONTRAST 9/18/2022 12:14 PM     Provided History: neck pain post fall     Comparison: CT chest and pelvis 11/15/2021     Technique: Using multidetector thin collimation helical acquisition  technique, axial, coronal and sagittal CT images through the cervical  spine were obtained without intravenous contrast.      Findings:     The cervical vertebrae are normally aligned. Straightening of the  cervical curvature.      No acute fracture or traumatic subluxation.  No prevertebral edema.      There is multilevel disc height narrowing throughout. Multilevel  cervical spondylosis with disc osteophyte complexes, uncinate  spurring, and facet arthropathy. Mild spinal canal narrowing at C4-5  and C5-6. Mild to moderate bilateral neural foraminal narrowing at  C4-5, mild to moderate right and mild left neural stenosis at C5-6.     No abnormality of the paraspinous soft tissues.                                                                      Impression:   1. No acute fracture or traumatic subluxation.  2. Multilevel cervical spondylosis, with minimal spinal canal  narrowing at C4-C5 and C5-C6.     I have personally reviewed the examination and initial interpretation  and I agree with the findings.       EMG:  na      Diagnosis:  (M54.12) Cervical radiculopathy, chronic  (primary encounter diagnosis)  Comment:   Plan: HYDROcodone-acetaminophen (NORCO) 5-325 MG         tablet            (M47.12) Cervical spondylosis with myelopathy  Comment:   Plan:     (F11.90) Chronic, continuous use of opioids  Comment:   Plan:     (G89.29) Chronic intractable pain  Comment:   Plan:           Plan on 11/17/2023:  Interventions:  JONATHON with Dr. Gallegos      Medications:  Start hydrocodone 5/325mg TID for 3 wks.    Amitriptyline 75mg q hs   Celexa 20mg 1.5 tabs daily 30mg daily  Cyclobenzaprine 10 mg TID PRN  Hydroxyzine 25mg 1-2 tabs QID anxiety  Zofran 8mg prn nausea with migraines  Oxycodone 5mg TID - last dose last saturday.    Stool softeners PRN  Imitrex 100mg PRN HA - per PCP    UDS and opioid agreement signed on 11/03/2023.      Follow up:   Follow-up in clinic in 2 wks after JONATHON with Dr. Gallegos.        Desi Amaya CNP          BILLING TIME DOCUMENTATION:   The total TIME spent on this patient on the date of the encounter/appointment was 30 minutes.

## 2024-06-24 NOTE — PATIENT INSTRUCTIONS
Plan on 11/17/2023:  Interventions:  JONATHON with Dr. Gallegos      Medications:  Start hydrocodone 5/325mg TID for 3 wks.    Amitriptyline 75mg q hs   Celexa 20mg 1.5 tabs daily 30mg daily  Cyclobenzaprine 10 mg TID PRN  Hydroxyzine 25mg 1-2 tabs QID anxiety  Zofran 8mg prn nausea with migraines  Oxycodone 5mg TID - last dose last saturday.    Stool softeners PRN  Imitrex 100mg PRN HA - per PCP    UDS and opioid agreement signed on 11/03/2023.      Follow up:   Follow-up in clinic in 2 wks after JONATHON with Dr. Gallegos.        [facility] Number:  [phone]  Call with any questions about your care and for scheduling assistance.   Calls are returned Monday through Friday between 8 AM and 4:30 PM. We usually get back to you within 2 business days depending on the issue/request.    If we are prescribing your medications:  For opioid medication refills, call the clinic or send a [x+1] message 7 days in advance.  Please include:  Name of requested medication  Name of the pharmacy.  For non-opioid medications, call your pharmacy directly to request a refill. Please allow 3-4 days to be processed.   Per [place] State Law:  All controlled substance prescriptions must be filled within 30 days of being written.    For those controlled substances allowing refills, pickup must occur within 30 days of last fill.      We believe regular attendance is key to your success in our program!    Any time you are unable to keep your appointment we ask that you call us at least 24 hours in advance to cancel.This will allow us to offer the appointment time to another patient.   Multiple missed appointments may lead to dismissal from the clinic.

## 2024-07-09 ENCOUNTER — TELEPHONE (OUTPATIENT)
Dept: PALLIATIVE MEDICINE | Facility: OTHER | Age: 59
End: 2024-07-09
Payer: MEDICARE

## 2024-07-09 NOTE — TELEPHONE ENCOUNTER
VM left for patient. Reminded patient for her upcoming procedure tomorrow, July 8th. Reminded patient to arrive for her procedure at 3:45 pm and to have a . Also reminded patient to take their medications prior to the procedure and that they may eat or drink. Patient instructed to call us if she has started an antibiotic or have an active infection. Patient also instructed to call us if they have recently received a vaccination in the last 7 days.

## 2024-07-10 ENCOUNTER — RADIOLOGY INJECTION OFFICE VISIT (OUTPATIENT)
Dept: PALLIATIVE MEDICINE | Facility: OTHER | Age: 59
End: 2024-07-10
Attending: NURSE PRACTITIONER
Payer: MEDICARE

## 2024-07-10 VITALS — DIASTOLIC BLOOD PRESSURE: 59 MMHG | HEART RATE: 89 BPM | SYSTOLIC BLOOD PRESSURE: 125 MMHG | OXYGEN SATURATION: 96 %

## 2024-07-10 DIAGNOSIS — M54.12 CERVICAL RADICULOPATHY: ICD-10-CM

## 2024-07-10 PROCEDURE — 62321 NJX INTERLAMINAR CRV/THRC: CPT | Mod: GC | Performed by: STUDENT IN AN ORGANIZED HEALTH CARE EDUCATION/TRAINING PROGRAM

## 2024-07-10 PROCEDURE — 62321 NJX INTERLAMINAR CRV/THRC: CPT | Performed by: STUDENT IN AN ORGANIZED HEALTH CARE EDUCATION/TRAINING PROGRAM

## 2024-07-10 PROCEDURE — 255N000002 HC RX 255 OP 636: Performed by: STUDENT IN AN ORGANIZED HEALTH CARE EDUCATION/TRAINING PROGRAM

## 2024-07-10 PROCEDURE — 250N000011 HC RX IP 250 OP 636: Performed by: STUDENT IN AN ORGANIZED HEALTH CARE EDUCATION/TRAINING PROGRAM

## 2024-07-10 RX ORDER — DEXAMETHASONE SODIUM PHOSPHATE 10 MG/ML
10 INJECTION INTRAMUSCULAR; INTRAVENOUS ONCE
Status: COMPLETED | OUTPATIENT
Start: 2024-07-10 | End: 2024-07-10

## 2024-07-10 RX ADMIN — DEXAMETHASONE SODIUM PHOSPHATE 10 MG: 10 INJECTION, SOLUTION INTRAMUSCULAR; INTRAVENOUS at 16:09

## 2024-07-10 RX ADMIN — IOHEXOL 1 ML: 180 INJECTION INTRAVENOUS at 16:03

## 2024-07-10 ASSESSMENT — PAIN SCALES - GENERAL
PAINLEVEL: EXTREME PAIN (8)
PAINLEVEL: MODERATE PAIN (4)

## 2024-07-10 NOTE — NURSING NOTE
Pre-procedure Intake  If YES to any questions or NO to having a   Please complete laminated checklist and leave on the computer keyboard for Provider, verbally inform provider if able.    For SCS Trial, RFA's or any sedation procedure:  Have you been fasting? NA  If yes, for how long?     Are you taking any any blood thinners such as Coumadin, Warfarin, Jantoven, Pradaxa Xarelto, Eliquis, Edoxaban, Enoxaparin, Lovenox, Heparin, Arixtra, Fondaparinux, or Fragmin? OR Antiplatelet medication such as Plavix, Brilinta, or Effient?   No   If yes, when did you take your last dose?     Do you take aspirin?  No  If cervical procedure, have you held aspirin for 6 days?   NA    Is the Pt taking any GLP-1 Antagonist (hold needed for sedation patients only)  (semaglutide (Ozempic, Wegovy), dulaglutide (Trulicity), exenatide ER (Bydureon), tirzepatide (Mounjaro), Liraglutide (Saxenda, Victoza), semaglutide (Rybelsus)     NA  If yes, when did you take your last dose?     Do you have any allergies to contrast dye, iodine, steroid and/or numbing medications?  NO    Are you currently taking antibiotics or have an active infection?  NO    Have you had a fever/elevated temperature within the past week? NO    Are you currently taking oral steroids? NO    Do you have a ? Yes    Are you pregnant or breastfeeding?  NO    Have you received any vaccinations in the last week? NO    Notify provider and RNs if systolic BP >170, diastolic BP >100, P >100 or O2 sats < 90%     Dorene Benjamin MA  [facility] [place]

## 2024-07-10 NOTE — PATIENT INSTRUCTIONS
[facility] [place]   Procedure Discharge Instructions    Today you saw:    Dr. Gallegos    You had an: cervical epidural steroid injection       Medications used:   Lidocaine   Dexamethasone   Omnipaque          If you have received sedation before, during, or after your procedure, for the next 24 hours you shall NOT:   -Drive  -Operate machinery  -Drink alcohol  -Sign any legal documents      Be cautious when walking. Numbness and/or weakness in the lower extremities may occur for up to 6-8 hours after the procedure due to effect of the local anesthetic  Do not drive for 6 hours. The effect of the local anesthetic could slow your reflexes.   You may resume your regular activities after 24 hours  Avoid strenuous activity for the first 24 hours  You may shower, however avoid swimming, tub baths or hot tubs for 24 hours following your procedure  You may have a mild to moderate increase in pain for several days following the injection.  It may take up to 14 days for the steroid medication to start working although you may feel the effect as early as a few days after the procedure.     You may use ice packs for 10-15 minutes, 3 to 4 times a day at the injection site for comfort  Do not use heat to painful areas for 6 to 8 hours. This will give the local anesthetic time to wear off and prevent you from accidentally burning your skin.   Unless you have been directed to avoid the use of anti-inflammatory medications (NSAIDS), you may use medications such as ibuprofen, Aleve or Tylenol for pain control if needed.   If you were fasting, you may resume your normal diet and medications after the procedure  If you have diabetes, check your blood sugar more frequently than usual as your blood sugar may be higher than normal for 10-14 days following a steroid injection. Contact your doctor who manages your diabetes if your blood sugar is higher than usual  Possible side effects of steroids that you  may experience include flushing, elevated blood pressure, increased appetite, mild headaches and restlessness.  All of these symptoms will get better with time.  If you experience any of the following, call the Pain Clinic at [phone]:  -Fever over 100 degree F  -Swelling, bleeding, redness, drainage, warmth at the injection site  -Progressive weakness or numbness in your legs or arms  -Loss of bowel or bladder function  -Unusual headache that is not relieved by Tylenol or other pain reliever  -Unusual new onset of pain that is not improving

## 2024-07-10 NOTE — PROGRESS NOTES
Pre procedure Diagnosis: Cervical Radiculopathy   Post procedure Diagnosis: Same  Procedure performed: C7-T1 interlaminar epidural steroid injection, CPT code 65748  Anesthesia: none  Complications: none immediately noted  Operators: Remberto Gallegos MD    Indications:   Nathalie Bashir is a 58 year old female was sent by Desi Albright APRN  for a cervical epidural steroid injection. During the consent process, this procedure was determined to be an appropriate intervention for the patient's symptoms.    Options/alternatives, benefits and risks were discussed with the patient including bleeding, infection, no pain relief, tissue trauma, exposure to radiation, reaction to medications, spinal cord injury, dural puncture, weakness, numbness and headache.  Questions were answered to her satisfaction and she agrees to proceed. Voluntary informed consent was obtained and signed.     Vitals were reviewed: Yes  Allergies were reviewed:  Yes   Medications were reviewed:  Yes   Pre-procedure pain score: 8/10    Procedure:  After getting informed consent, patient was brought into the procedure suite and was placed in a prone position on the procedure table.   A procedural pause was performed.  Patient was prepped and draped in sterile fashion.     The C7-T1 interspace was identified with AP fluoroscopy.  A total of 2ml of 1% lidocaine was used to anesthetize the skin for a right paramedian approach.    A 22gauge 3.5 inch Touhy needle was advanced under intermittent fluoroscopy.  A TU syringe was used to advance the needle into the epidural space.   After loss of resistance, there was no evidence of blood or CSF on aspiration. Location was verified with both AP and either contralateral oblique or lateral fluoroscopic imaging.    A total of 2 ml of Omnipaque 180 was injected demonstrating appropriate epidural spread and was checked in both the AP and lateral views. There was no evidence of intravascular or intrathecal  spread.    A mixture of 10mg of dexamethasone and 2ml of preservative free saline was injected.  The needle was removed from the epidural space and fully removed from the skin.     Hemostasis was achieved, the area was cleaned, and bandaids were placed when appropriate.  The patient tolerated the procedure well, and was taken to the recovery room.    Images were saved to PACS.    Post-procedure pain score: 4/10  Follow-up includes:   -f/u with referring provider    Procedure was done with Dr. El PATEL  Pain Fellow  [facility]    Physician Attestation   I, Remberto Gallegos MD, was present for all key and critical portions of the procedure, and I was immediately available during the remainder of the procedure.  Any changes to the documentation have been made above.    Remberto Gallegos MD  Interventional Pain Medicine  [facility]

## 2024-07-17 DIAGNOSIS — C16.3 MALIGNANT NEOPLASM OF PYLORIC ANTRUM (H): ICD-10-CM

## 2024-07-17 DIAGNOSIS — R10.13 ABDOMINAL PAIN, EPIGASTRIC: ICD-10-CM

## 2024-07-17 DIAGNOSIS — D49.0 IPMN (INTRADUCTAL PAPILLARY MUCINOUS NEOPLASM): ICD-10-CM

## 2024-07-17 RX ORDER — HEPARIN SODIUM,PORCINE 10 UNIT/ML
5-20 VIAL (ML) INTRAVENOUS DAILY PRN
OUTPATIENT
Start: 2024-07-17

## 2024-07-17 RX ORDER — PANTOPRAZOLE SODIUM 40 MG/1
40 TABLET, DELAYED RELEASE ORAL 2 TIMES DAILY
Qty: 180 TABLET | Refills: 1 | Status: SHIPPED | OUTPATIENT
Start: 2024-07-17

## 2024-07-17 RX ORDER — HEPARIN SODIUM (PORCINE) LOCK FLUSH IV SOLN 100 UNIT/ML 100 UNIT/ML
5 SOLUTION INTRAVENOUS
OUTPATIENT
Start: 2024-07-17

## 2024-07-22 ASSESSMENT — PAIN SCALES - PAIN ENJOYMENT GENERAL ACTIVITY SCALE (PEG)
PEG_TOTALSCORE: 6.33
AVG_PAIN_PASTWEEK: 6
INTERFERED_ENJOYMENT_LIFE: 6
INTERFERED_GENERAL_ACTIVITY: 7

## 2024-07-23 NOTE — PROGRESS NOTES
Date 07/24/2024:      COMPREHENSIVE PAIN CLINIC FOLLOW UP EVALUATION    Ms. Nathalie Bashir on 11/3/2023 in [facility] per request of Dr. Dobbs with regards to her pain.  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  UDS and opioid agreement singed and UDS appropriate on 11/03/2023.  Nathalie Bashir has been seen at a pain clinic in the past in [place] 10 years ago, [facility] in 1990s and [facility].  UDS and opioid agreement signed on 11/03/2023.      Updates since last appointment on 06/24/2024.  Presents alone using a single point cane.    6/24/2024 filled hydrocodone 5/325mg TID for 3 wks since she overused last month for dental pain. Last dose this am.    Needs refills of oxycodone 5mg TID 90 tabs for 30 days.  Needs refills of hydroxyzine 25mg 1-2 tabs QID.    Neck pain after JONATHON decreased pain by 50%.    Mother in law is in hospice.          Interventions/Injections:   07/10/2024 Dr. El HOLT C7-T1  1/18/24 R) shoulder injection with Dr Gallegos: approx 65% improvement, also going to PT which is helpful.    Progress Notes Reviewed:  07/10/2024 Dr. El HOLT C7-T1      Any hospitalizations/ER/UC visits since last appointment:  no  Any falls/accidents since last appointment:  no      Primary Pain :  neck pain L=R on left radiating down b/l arms.  She is not sleeping well.  JONATHON decreased her pain by at least 50%.  She did PT in 04/2024 but she continues to do HEP.  Ice is helpful.      Characteristics:  Any changes in pain characteristics since last appointment?  Yes 50% less pain after JONATHON    The patient describes the pain as constant, stabbing, burning and numbness.     What makes the pain better:  Her pain is improved with laying down, rest.     What makes the pain worse:   She reports that the pain is made worse by moving her head, using her arms, ADL's.    07/24/2024 current pain on 0/10 VAS:   1-2    Worst pain:   10 prior to JONATHON  06/07/2024 current pain on 0/10 VAS:   7    06/07/2024 current pain on 0/10 VAS:   7          03/15/2024 current pain on 0/10 VAS:   6          Current Pain Related Medications:  Any medications changes since last appointment: no      Start hydrocodone 5/325mg TID for 3 wks.    Amitriptyline 75mg q hs   Celexa 20mg 1.5 tabs daily 30mg daily  Cyclobenzaprine 10 mg TID PRN  Hydroxyzine 25mg 1-2 tabs QID anxiety  Zofran 8mg prn nausea with migraines  Oxycodone 5mg TID - last dose last saturday.    Stool softeners PRN  Imitrex 100mg PRN HA - per PCP    UDS and opioid agreement signed on 11/03/2023.        Therapies discuss on initial consult:   Physical therapy, Pain Psychology, TENs unit, Grounding Mat, Frequency Specific Micro Current, Anti-inflammatory Lifestyle    Social:    She is  and  lives in a house.  She has 1 grown son and 4 grandchildren.  She is independent in ADL's.      Employment: She is on SSD.    Exercise:  Last PT was 2018.  Patient exercises with stretching at home. Starting PT on Monday.    Hobbies:  She enjoys her grandchildren.    Mental Health:  Patient reports depression is under control on citalopram 30mg.  Patient does not follow with a mental health care provi              Plan on 06/24/2024:  Interventions:  JONATHON with Dr. Gallegos      Medications:  Start hydrocodone 5/325mg TID for 3 wks.    Amitriptyline 75mg q hs   Celexa 20mg 1.5 tabs daily 30mg daily  Cyclobenzaprine 10 mg TID PRN  Hydroxyzine 25mg 1-2 tabs QID anxiety  Zofran 8mg prn nausea with migraines  Oxycodone 5mg TID - last dose last saturday.    Stool softeners PRN  Imitrex 100mg PRN HA - per PCP    UDS and opioid agreement signed on 11/03/2023.      Follow up:   Follow-up in clinic in 2 wks after JONATHON with Dr. Gallegos.        Updates  since last appointment on 06/07/2024   Jet broke and had it repaired and developed dry socket.     She accidentally over used her oxycodone and ran out last Saturday night - last filled on 06/06/2024.  Discussed opioid agreement and she cannot self escalate the opioids for acute dental pain.  She acknowledged understanding.  If there is another violation of her opioid agreement  will have to taper off C2 opioids.    neck pain L=R on left radiating down b/l arms.  She is not sleeping well.  She had JONATHON many years ago at [place] which decreased her pain by at least 50% for 6 months.. She did PT in 04/2024 but she continues to do HEP.  Ice is helpful.    Started compounding cream with ketamine 8% and gabapentin 8% to neck, shoulder, feet and started triggering migraines so she stopped the medication.        Interventions/Injections:   1/18/24 R) shoulder injection with Dr Gallegos: approx 65% improvement, also going to PT which is helpful.      Progress Notes Reviewed:  04/27/2024 ED - [facility]  04/19/2024 Dr. Apurva Dobbs PM&R  04/11/2024 PT - R) shoulder pain  04/01/2024 ED - [facility]  03/15/2024 Sue Herrera, Pain Clinic    Any hospitalizations/ER/UC visits since last appointment:  no  Any falls/accidents since last appointment:  no      Primary Pain :  Neck and right shoulder      Characteristics:  No changes in pain characteristics since last appointment.    What makes the pain better:  Her pain is improved with laying down, rest.    What makes the pain worse:   She reports that the pain is made worse by moving her head, using her arms, ADL's.  06/07/2024 current pain on 0/10 VAS:   7    06/07/2024 current pain on 0/10 VAS:   7          03/15/2024 current pain on 0/10 VAS:   6         Current Pain Related Medications:  Any medications changes since last appointment: no    Amitriptyline 75mg q hs   Celexa 20mg 1.5 tabs daily 30mg daily  Compounding medication - stopped due to migraines  Cyclobenzaprine 10 mg TID  PRN - needs refills today  Hydroxyzine 25mg 1-2 tabs QID anxiety  Zofran 8mg prn nausea with migraines - needs refills today for migraines  Oxycodone 5mg TID - last dose this am.  Last script per  05/07/2024.  Stool softeners PRN  Imitrex 100mg PRN HA - per PCP      Interventions:  JONATHON is scheduled 07/10/2024  She would like to repeat R) shoulder injection in the future      Therapies discuss on initial consult:   Physical therapy, Pain Psychology, TENs unit, Grounding Mat, Frequency Specific Micro Current, Anti-inflammatory Lifestyle    Social:    She is  and  lives in a house.  She has 1 grown son and 4 grandchildren.  She is independent in ADL's.      Employment: She is on SSD.    Exercise:  Last PT was 2018.  Patient exercises with stretching at home. Starting PT on Monday.    Hobbies:  She enjoys her grandchildren.    Mental Health:  Patient reports depression is under control on citalopram 30mg.  Patient does not follow with a mental health care provider.            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  Stomach cancer was treated with gastrectomy and chemotherapy.  Doctors found stomach cancer when they did Whipple Surgery 01/2020 at [facility].  Patient has numbness and tingling in legs in the stocking foot distribution and b/l hands.  Patient has lumbar pain and has had lumbar MBB x 1 with Dr. Urbina.  Patient has not had any spine surgery in the past.  The patient describes the pain as constant, stabbing, burning and numbness.  She reports that the pain is made worse by prolong standing, ADL's.  Her pain is improved with laying down, rest.  She rates her currenty pain score at 7/10, but it can be as low as 3/10 or as severe as 9/10. Patient follows with Dr. Urbina  for lumbar intervention options.  Rheumatology could not find any cause for her joint pain.      Progress Notes Reviewed:  10/19/2023 Dr. Dobbs, PM&R - order quad cane and compression stockings, follow-up with Dr. Urbina for low back pain  10/17/2023 Dr. Mckoy, Rheumatology  10/01/2023 UC - UTI  09/27/2023 Dr. Urbina - lumbar MBB L4-5 and L5-S1  09/12/2023 Dr. Urbina    Patient reports depression is under control on citalopram 30mg.  Patient does not follow with a mental health care provider.  Last PT was 2018.  Patient exercises with stretching at home.    She denies any new problems with falls or balance, any new numbness or weakness of the arms or legs, any new bowel or bladder incontinence, any night sweats or unexplained fevers, or any sudden or unexpected weight loss.      Nathalie Bashir has been seen at a pain clinic in the past in [place] 10 years ago, [facility] in 1990s and  [facility].      Current Treatments:  Amitriptyline 75mg q hs - she ran out of this medication for sleep.  Not helpful for neuropathic pain.  Celexa 20mg 1.5 tabs daily 30mg daily  Compounding medication  Cyclobenzaprine 10 mg TID PRN  Hydroxyzine 25mg 1-2 tabs QID anxiety  Zofran 8mg prn nausea with migraines  Oxycodone 5mg TID - since 2020 with palliative care  Stool softeners PRN  Imitrex 100mg PRN HA    NOTE:  allergies to naprosyn, methadone, latex, ketorlac, gabapentin, acetaminophen, topiramate, pregabalin and oxycontin    Previous Medication Treatments Included:  Anti-convulsants: Gabapentin, pregabalin had negative SE  Muscle relaxors: Tizanidine not helpful  Anti-depressants: amitriptyline was not helpful for pain, duloxetine had negative SE with mood  Benzodiazapine's: none  Acetaminophen/NSAIDs: negative SE  Topicals: Voltaren gel 1%  Headache abortives: imitrex  Headache prophylactics:   Opioids: MS ER 30mg BID, Oxycodone 10mg BID at previous pain - [facility] in 1990, buprenorphine caused negative SE      Other Treatments  Have Included:  Physical therapy: yes over 2 years ago  Pain Psychology: no  Chiropractic: yes - not helpful  Acupuncture: yes - not helpful  TENs Unit: yes she has one  Injections: 09/27/2023 lumbar MBB #1  Surgeries: L) hip replacement 2015, L) SI fusion 2008  Dry Needling: no  Massage:no    Past Medical History:  Medical history reviewed.  Past Medical History:   Diagnosis Date    Allergic rhinitis     Cancer (H)     stomach cancer    Chronic pain     Closed fracture of distal end of left radius     Depression     Gastric adenocarcinoma (H)     Lumbago     Migraine     Opioid dependence (H)     Other chronic pain     low back    Sacroiliitis (H24)     low back pain    Trochanteric bursitis     leg length discrrepancy, hip pain      Patient Active Problem List   Diagnosis    Lower limb length difference    IPMN (intraductal papillary mucinous neoplasm)    Allergic rhinitis    Arthralgia of hip    Bursitis    Chronic neck pain    Chronic pain disorder    Continuous opioid dependence (H)    Controlled substance agreement signed    Debility    Degeneration of intervertebral disc of lumbosacral region    Depression    Disorder of sacrum    Greater trochanteric bursitis of left hip    Infected prosthesis of left hip (H24)    Chronic bilateral low back pain without sciatica    MDD (major depressive disorder), recurrent, in partial remission (H24)    Migraine with aura    Sacroiliitis (H24)    Screening for malignant neoplasm of cervix    Spondylosis of lumbosacral spine without myelopathy    Status post left hip replacement    Cervical radiculopathy, chronic    Unilateral inguinal hernia without obstruction or gangrene    Long term (current) use of opiate analgesic    Low urine output    Hypophosphatemia    Leukocytosis    Intra-abdominal fluid collection    Bacteremia due to Gram-negative bacteria    Malnutrition (H24)    Gastric adenocarcinoma (H)    Pneumonia    Mucositis    Chemotherapy-induced neutropenia (H24)     Gastric cancer (H)    Cholangitis (H28)    Thrombocytopenia (H24)    Urinary tract infection    Crohn's disease of small intestine (H)    Jejunitis    Colitis    Abdominal pain    Malignant neoplasm of overlapping sites of stomach (H)    Iron deficiency anemia    Anemia    Paralytic ileus (H)    Hypokalemia    Nausea & vomiting    Dysphagia    History of gastric surgery    Chemotherapy-induced neuropathy (H24)    Bilateral hand pain    Pyelonephritis    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  Past Surgical History:   Procedure Laterality Date    ARTHROPLASTY HIP Left 2016    BACK SURGERY      L4-5 decompression    CATARACT IOL, RT/LT      CHOLECYSTECTOMY N/A 1/28/2020    Procedure: Cholecystectomy;  Surgeon: Yandel Mallory MD;  Location: [facility] OR    ENDOSCOPIC RETROGRADE CHOLANGIOPANCREATOGRAM N/A 7/27/2020    Procedure: ENDOSCOPIC RETROGRADE CHOLANGIOPANCREATOGRAPHY  **Latex Allergy** with jejunal biopsies;  Surgeon: Jeet Aviles MD;  Location:  OR    ESOPHAGOSCOPY, GASTROSCOPY, DUODENOSCOPY (EGD), COMBINED N/A 3/3/2020    Procedure: ESOPHAGOGASTRODUODENOSCOPY, WITH ENDOSCOPIC US (enoxaparin);  Surgeon: Bakari Plata MD;  Location:  GI    ESOPHAGOSCOPY, GASTROSCOPY, DUODENOSCOPY (EGD), COMBINED N/A 12/7/2020    Procedure: Upper Endoscopy with stent placement;  Surgeon: Jeet Aviles MD;  Location:  OR    ESOPHAGOSCOPY, GASTROSCOPY, DUODENOSCOPY (EGD), COMBINED N/A 2/8/2021    Procedure: ESOPHAGOGASTRODUODENOSCOPY (EGD)AND STENT REMOVAL;  Surgeon: Jeet Avlies MD;  Location:  OR    EYE SURGERY      GASTRECTOMY N/A 10/21/2020    Procedure: Open subtotal gastectomy with David en Y Reconstruction; D1 Lymph Node Disection   **Latex Allergy**;  Surgeon: Yandel Mallory MD;  Location: [facility] OR    INJECT BLOCK MEDIAL BRANCH CERVICAL/THORACIC/LUMBAR Bilateral 9/27/2023    Procedure: BILATERAL L4-5, L5-S1 medial branch block #1;  Surgeon: Maria Victoria Urbina MD;  Location: [facility] OR    IR CHEST PORT PLACEMENT > 5 YRS OF AGE  3/26/2020    IR LUMBAR EPIDURAL STEROID INJECTION  8/25/2009    IR LUMBAR EPIDURAL STEROID INJECTION  9/2/2009    IR LUMBAR EPIDURAL STEROID INJECTION  9/25/2009    IR LUMBAR EPIDURAL STEROID INJECTION  11/23/2009    IR LUMBAR EPIDURAL STEROID INJECTION  4/1/2010    IR LUMBAR EPIDURAL STEROID INJECTION  9/1/2010    IR LUMBAR EPIDURAL STEROID INJECTION  3/10/2011    IR LUMBAR EPIDURAL STEROID INJECTION  8/30/2011    IR PICC PLACEMENT > 5 YRS OF AGE  10/12/2016    IR PORT REMOVAL RIGHT  12/8/2023    LAPAROSCOPY DIAGNOSTIC (GENERAL) N/A 10/13/2020    Procedure: Diagnostic laparoscopy with peritoneal washings  **Latex Allergy**;  Surgeon: Yandel Mallory MD;  Location: [facility] OR    OPEN REDUCTION INTERNAL FIXATION RODDING INTRAMEDULLARY FEMUR Left 12/23/2014    Procedure: OPEN REDUCTION INTERNAL FIXATION RODDING INTRAMEDULLARY FEMUR;  Surgeon: Arnol Santiago MD;  [facility] OR    OPEN REDUCTION INTERNAL FIXATION WRIST Left 9/22/2022    Procedure: LEFT WRIST OPEN REDUCTION INTERNAL FIXATION, FRACTURE;  Surgeon: Mark Villalobos MD;  [place] [facility] [place]THOPEDIC SURGERY      PICC DOUBLE LUMEN PLACEMENT Right 02/07/2020    5 fr double lumen 41 cm    SC LAMINEC/FACETECT/FORAMIN,LUMBAR 1 SEG      Description: Laminectomy Decompress, Facetectomy, Foraminotomy Lumbar Seg;  Recorded: 04/12/2012;    REMOVE HARDWARE LOWER EXTREMITY Left 4/7/2015    Procedure: REMOVE HARDWARE LOWER EXTREMITY;  Surgeon: Arnol Santiago MD;  [facility]    REMOVE HARDWARE RODDING INTRAMEDULLARY FEMUR Left 12/17/2015    Procedure: REMOVE HARDWARE RODDING INTRAMEDULLARY FEMUR;  Surgeon: Arnol Santiago MD;  [facility]    RESECT BONE LOWER EXTREMITY  Left 12/23/2014    Procedure: RESECT BONE LOWER EXTREMITY;  Surgeon: Arnol Santiago MD;  [facility]    SHORTENING OSTEOPLASTY FEMUR W/ IM NAILING      WHIPPLE PROCEDURE N/A 1/28/2020    Procedure: Open Whipple procedure and Cholecystectomy;  Surgeon: Yandel Mallory MD;  Location: [facility] OR    WHIPPLE PROCEDURE      ZZC FUSION OF SACROILIAC JOINT      Description: Arthrodesis Sacroiliac Joint Left;  Recorded: 10/07/2011;    ZZC REPAIR DETACH RETINA,SCLERAL BUCKLE          Medications: Pertinent medications reviewed.  Current Outpatient Medications   Medication Sig Dispense Refill    amitriptyline (ELAVIL) 75 MG tablet Take 1 tablet (75 mg) by mouth at bedtime 30 tablet 3    citalopram (CELEXA) 20 MG tablet Take 1.5 tablets (30 mg) by mouth daily 135 tablet 1    COMPOUNDED NON-CONTROLLED SUBSTANCE (CMPD RX) - PHARMACY TO MIX COMPOUNDED MEDICATION Estriol 1 mg/gram,place 1 gram vaginally three times per week,42.5 grams (Patient not taking: Reported on 6/24/2024) 1 each 3    Cyanocobalamin 1000 MCG CAPS Take 1 tablet by mouth every morning      cyclobenzaprine (FLEXERIL) 10 MG tablet Take 1 tablet (10 mg) by mouth 3 times daily as needed for muscle spasms 90 tablet 3    famotidine (PEPCID) 20 MG tablet TAKE 1 TABLET (20 MG) BY MOUTH 2 TIMES DAILY AS NEEDED 180 tablet 1    fluticasone (FLONASE) 50 MCG/ACT nasal spray Spray 1 spray into both nostrils daily at 2 pm      hydrOXYzine (VISTARIL) 25 MG capsule Take 25-50 mg by mouth 4 times daily as needed for itching or anxiety      loperamide (IMODIUM) 2 MG capsule Take 4 mg by mouth 4 times daily as needed for diarrhea (Patient not taking: Reported on 6/24/2024)      loratadine-pseudoePHEDrine (CLARITIN-D 24-HOUR)  MG 24 hr tablet Take 1 tablet by mouth daily as needed for allergies Usually takes in Spring/Fall      ondansetron (ZOFRAN) 8 MG tablet Take 1 tablet (8 mg) by mouth every 8 hours as needed (Nausea/Vomiting) 90 tablet 2    oxyCODONE (ROXICODONE) 5 MG  tablet Take 1 tablet (5 mg) by mouth 3 times daily as needed for breakthrough pain or moderate to severe pain Maximum 3 pills/day. Dispense 06/06/24 for start 06/08/24 90 tablet 0    pantoprazole (PROTONIX) 40 MG EC tablet TAKE 1 TABLET BY MOUTH 2 TIMES DAILY 180 tablet 1    sennosides (SENOKOT) 8.6 MG tablet 1-2 tablets 1-2 times daily as needed. 120 tablet 1    simethicone 80 MG TABS Take 1 tablet by mouth every 6 hours as needed (bloating, gas, belching) 90 tablet 3    SUMAtriptan (IMITREX) 100 MG tablet Take 100 mg by mouth at onset of headache for migraine         [place] Prescription Monitoring Program reviewed 07/24/2024.  No concern for abuse or misuse of controlled medications based on this report.  06/24/2024 Hydrocodone-acet 5/325mg 75 tabs for 25 ays  06/06/2024 Oxycodone 5mg 90 tabs for 30 days  05/07/2024 Oxycodone 5mg 90 tabs for 30 days  04/07/2024 Oxycodone 5mg 90 tabs for 30 days  Monthly scripts for oxycodone in 2024 no other medications listed.    Allergies: Pertinent allergies reviewed.     Allergies   Allergen Reactions    Gluten Meal Palpitations    Amoxicillin-Pot Clavulanate Rash     Patient tolerated Zosyn in 7/2020    Oxycontin [Oxycodone] Other (See Comments)     Confusion, loopy, oxycodone is tolerated.    Pt states she tolerates regular oxycodone, cannot take 12 hour oxycontin.      Pregabalin      interfered with Cymbalta    Topiramate      Tongue numbness, taste alteration, irritable     Acetaminophen Nausea     Urine retention      Dust Mite Extract     Gabapentin Dizziness and GI Disturbance    Ketorolac Headache    Latex Rash    Methadone Fatigue    Mold     Naprosyn [Naproxen] Dizziness and GI Disturbance    Seasonal Allergies        Family History:   family history includes Dementia in her father; Heart Failure (age of onset: 77) in her mother; No Known Problems in her brother and sister.    Social History:   She is  and  lives in a house.  She has 1 grown child.  She is on  SSD.  She is independent in ADL's.  She has 4 grand children who live in [place].  She  reports that she quit smoking about 4 years ago. Her smoking use included cigarettes. She started smoking about 29 years ago. She has a 12.5 pack-year smoking history. She has been exposed to tobacco smoke. She has never used smokeless tobacco. She reports that she does not drink alcohol and does not use drugs.  Social History     Social History Narrative    Not on file       Physical Exam:  [facility] 09/23/2014     Constitutional: She is oriented to person, place, and time.  She appears well-developed and well-nourished. She is not in acute distress.   HENT:     Head: Normocephalic and atraumatic.     Eyes: Pupils are equal, round, and reactive to light. EOM are normal. No scleral icterus.   Pulmonary/Chest:  NWOB. No respiratory distress.   Neurological: She is alert and oriented to person, place, and time. Coordination grossly normal.  Skin: Skin is warm and dry. She is not diaphoretic.   Psychiatric: She has a normal mood and affect. Her behavior is normal. Judgment and thought content normal.  Patient answers questions appropriately.  MSK: Gait is antalgic.    Cervical:  ROM is moderately painful in all planes and unrestricted.  Sensation intact to upper extremities.  Strength 5/5 in upper extremities.  Spurling's test is negative.    Narrative & Impression   EXAMINATION: CT CHEST/ABDOMEN/PELVIS W CONTRAST, 11/13/2023 12:03 PM     TECHNIQUE:  Helical CT images from the thoracic inlet through the  symphysis pubis were obtained with IV contrast. Contrast dose: Isovue  370 88cc     COMPARISON: CT abdomen and pelvis dated 8/30/2023, CT chest abdomen  pelvis dated 5/17/2023.     HISTORY: Malignant neoplasm of pyloric antrum (H)     FINDINGS:  CHEST:  LUNGS: Central tracheobronchial tree is patent. No pneumothorax or  pleural effusion. No focal airspace opacity. No suspicious pulmonary  nodule. Unchanged subcentimeter nodule along the  left fissure likely  represents an intrafissural lymph node. Stable few sub-4 mm pulmonary  nodules are likely benign.      MEDIASTINUM: Right port catheter terminates in distal SVC. Heart size  is within normal limits. No pericardial effusion. Ascending aorta and  main pulmonary artery diameters are within normal limits. Normal  appearance and configuration of the great vessels off of the aortic  arch. No suspicious mediastinal, hilar, or axillary lymph nodes.      Visualized thyroid and esophagus are unremarkable.     ABDOMEN/PELVIS:  LIVER: No suspicious focal hepatic lesion.     BILIARY: Gallbladder is surgically absent. Unchanged mild pneumobilia.  No intrahepatic or extrahepatic biliary ductal dilation.     PANCREAS: Post Whipple's procedure. No focal pancreatic lesion. No  pancreatic duct dilatation.     SPLEEN: Within normal limits.     ADRENAL GLANDS: No focal adrenal nodule.     URINARY TRACT: No suspicious renal lesion. No hydronephrosis or  hydroureter. No renal stone. Urinary bladder is unremarkable.     REPRODUCTIVE ORGANS: Within normal limits.     BOWEL: Partial gastrectomy and gastrojejunostomy. Extensive colonic  diverticulosis. Normal caliber large and small bowel. No abnormal  bowel wall thickening or enhancement. Appendix is unremarkable.     PERITONEUM/FLUID: No ascites or pelvic free fluid.     VESSELS: No aneurysmal dilatation of the abdominal aorta.  The portal,  splenic, and superior mesenteric veins are patent.  The origins of the  celiac and superior mesenteric arteries are patent.     LYMPH NODES: No lymphadenopathy.     BONES/SOFT TISSUES: No aggressive osseous lesions. Degenerative  changes in the spine. L1 hemangioma. Few chronic right rib fractures.  Stable small circumscribed lucent focus in the sternum is likely  benign. Left hip arthroplasty. Left SI joint fixation hardware.  Changes of right inguinal hernia repair.                                                                        IMPRESSION:   No evidence of metastatic disease in the chest, abdomen, or pelvis.     CHERYL SOLIZ MD            EXAM: MR SHOULDER RIGHT WITHOUT CONTRAST  LOCATION: [facility]  DATE: 11/18/2023     INDICATION: Shoulder pain.  COMPARISON: None.  TECHNIQUE: Unenhanced.     FINDINGS:     ROTATOR CUFF:  -Supraspinatus: Mild tendinosis without tearing. Normal muscle without strain or atrophy.  -Infraspinatus: No tendon tear, tendinopathy or fatty atrophy.  -Subscapularis: Mild tendinosis without tear or tendon thinning. Normal muscle without edema or atrophy.  -Teres minor: Normal tendon and muscle.     CORACOACROMIAL ARCH:  -Morphology: Developmental variant os acromiale. Small amount of fluid at the pseudoarticulation. No significant inflammatory changes or stress reaction. Type I acromial morphology. No subacromial spur. Subacromial and subcoracoid space are normal.   -Bursa: Mild subacromial/subdeltoid bursitis.     ACROMIOCLAVICULAR JOINT:   -Normal alignment. Minimal degenerative arthrosis.      LONG HEAD OF BICEPS TENDON:   -Small tenosynovial effusion. Normal tendon without tear or tendinosis.     GLENOHUMERAL JOINT:   -Labrum: Degenerative surface fraying and blunting of the labrum. No full-thickness tear/avulsion.   -Cartilage: Smooth high-grade cartilage loss, with full-thickness thinning over the glenoid, and mixed high-grade partial-thickness and full-thickness thinning over the humeral head.  -Joint space: Minimally increased joint fluid. Mild synovitis. No loose intra-articular bodies.  -Glenohumeral ligaments and capsule: No pericapsular inflammation.     BONES:   -Negative for fracture, AVN, or bone marrow replacement lesion.  -End-stage degenerative arthritic changes, with marginal osteophytes.     SOFT TISSUES:   -Normal deltoid muscle bulk. Normal visualized chest wall and axilla.                                                                      IMPRESSION:  1.   Advanced osteoarthrosis, with high-grade cartilage loss, including areas of exposed subchondral bone over the glenoid and humeral head. Large marginal osteophytes.     2.  Minimally increased glenohumeral joint fluid. Mild synovitis. No loose bodies.     3.  Intact rotator cuff without full-thickness or partial-thickness tear. Mild supraspinatus and subscapularis tendinosis.     4.  Mild subacromial/subdeltoid bursitis.     5.  Small biceps tenosynovial effusion. No tendon tearing or significant tendinosis.     6.  Incidentally identified os acromiale developmental variant morphology.      Narrative & Impression   EXAM: MR LUMBAR SPINE W/O CONTRAST  LOCATION: [facility]  DATE: 9/3/2023     INDICATION: History of lumbar lami (+10yrs), primarily axial LBP>BLE radicular pain.  COMPARISON: Plain film imaging 08/29/2023 of the lumbar spine, prior body CT 08/30/2023 also reviewed.  TECHNIQUE: Routine Lumbar Spine MRI without IV contrast.     FINDINGS:   Nomenclature is based on 5 lumbar type vertebral bodies. Rudimentary 12th ribs are identified. Partial sacralization left L5. Correlate with plain film imaging if there is future plan for intervention. Satisfactory lumbar vertebral body height. 2 mm   degenerative anterior subluxation L3-L4 as on prior body CT. No high-grade subluxations. No marrow or ligamentous edema. Nothing for sacral insufficiency or stress fracture. Nothing for a marrow-infiltrative process. Benign vertebral body hemangioma at   L1. Interspace narrowing lower thoracic level and at L3-L4. Congenital narrowing of the L5-S1 interspace. Left SI joint space fusion hardware with degenerative changes right SI joint. Sacral neural foramina are intact. Normal distal spinal cord and cauda   equina with conus medullaris at L1. Rightward lumbar curve. Nerve roots of the cauda equina are satisfactory without nodularity thickening. No retroperitoneal solid mass or adenopathy. Symmetric  psoas appearance. Normal caliber abdominal aorta.   Satisfactory renal contours.     T12-L1: Moderate loss of disc height with annular bulge. No disc herniation, spinal stenosis or foraminal narrowing is present. Facet joints are satisfactory.      L1-L2: Normal disc height and signal. Mild annular bulge. No herniation. Normal facets. No spinal canal or neural foraminal stenosis.     L2-L3: Mild loss of disc height with annular bulge. No disc herniation. No spinal stenosis. Mild foraminal narrowing bilaterally. Mild facet joint hypertrophic changes are present.      L3-L4: Mild loss of disc height. Low-grade degenerative anterior subluxation. Generalized disc bulge. No disc herniation. Mild foraminal narrowing inferiorly. No spinal stenosis. Mild facet joint arthropathy bilaterally.     L4-L5: Normal disc height and signal. Annular bulge. No herniation. Normal facets. No spinal canal or right neural foraminal stenosis. Mild left foraminal narrowing.     L5-S1: Partial sacralization on the left. Interspace narrowing on a congenital/developmental basis with disc desiccation. No disc herniation. No spinal stenosis or foraminal narrowing.                                                                      IMPRESSION:  1.  See above for counting nomenclature, stable from prior body CT examination 08/30/2023 with partial sacralization left L5. Correlate with plain film imaging if there is future plan for intervention.     2.  Low-grade degenerative anterior subluxation L3 upon L4. No high-grade subluxations. No marrow or ligamentous edema.     3.  No evidence for severe spinal stenosis or severe foraminal compromise at any lumbar level.     4.  See above for additional details and full level by level description.         EXAM: XR SACRUM/COCCYX   LOCATION: [facility]   DATE/TIME: 6/14/2021 10:39 AM     INDICATION: Tailbone pain for a year, history of adenocarcinoma of the stomach   COMPARISON: None.      IMPRESSION: Postsurgical change with cage at the left SI joint. Left hip arthroplasty. No fracture or malalignment. No osseous lesion. L5-S1 degenerative disc changes with facet arthrosis. Right hip is intact.     EXAM: XR SHOULDER RIGHT G/E 3 VIEWS  LOCATION: [facility]  DATE/TIME: 9/18/2022 12:10 PM     INDICATION: Pain following fall.  COMPARISON: None.                                                                      IMPRESSION: No fractures are identified. Normal glenohumeral alignment. Mild degenerative changes in the glenohumeral joint. The acromioclavicular joint is unremarkable. Right-sided port catheter.      Narrative & Impression   CT CERVICAL SPINE W/O CONTRAST 9/18/2022 12:14 PM     Provided History: neck pain post fall     Comparison: CT chest and pelvis 11/15/2021     Technique: Using multidetector thin collimation helical acquisition  technique, axial, coronal and sagittal CT images through the cervical  spine were obtained without intravenous contrast.      Findings:     The cervical vertebrae are normally aligned. Straightening of the  cervical curvature.      No acute fracture or traumatic subluxation. No prevertebral edema.      There is multilevel disc height narrowing throughout. Multilevel  cervical spondylosis with disc osteophyte complexes, uncinate  spurring, and facet arthropathy. Mild spinal canal narrowing at C4-5  and C5-6. Mild to moderate bilateral neural foraminal narrowing at  C4-5, mild to moderate right and mild left neural stenosis at C5-6.     No abnormality of the paraspinous soft tissues.                                                                      Impression:   1. No acute fracture or traumatic subluxation.  2. Multilevel cervical spondylosis, with minimal spinal canal  narrowing at C4-C5 and C5-C6.     I have personally reviewed the examination and initial interpretation  and I agree with the findings.        EMG:  na      Diagnosis:  (M54.12) Cervical radiculopathy, chronic  (primary encounter diagnosis)  Comment:   Plan: HYDROcodone-acetaminophen (NORCO) 5-325 MG         tablet            (M47.12) Cervical spondylosis with myelopathy  Comment:   Plan:     (F11.90) Chronic, continuous use of opioids  Comment:   Plan:     (G89.29) Chronic intractable pain  Comment:   Plan:           Plan on 07/24/2024:    Refill oxycodone 5mg TID 90 tabs for 30 days.  Refill hydroxyzine 25mg 1-2 tabs QID    Follow-up in three months in clinic.        Desi Amaya CNP          BILLING TIME DOCUMENTATION:   The total TIME spent on this patient on the date of the encounter/appointment was 19 minutes.

## 2024-07-24 ENCOUNTER — OFFICE VISIT (OUTPATIENT)
Dept: PALLIATIVE MEDICINE | Facility: OTHER | Age: 59
End: 2024-07-24
Attending: NURSE PRACTITIONER
Payer: MEDICARE

## 2024-07-24 VITALS
DIASTOLIC BLOOD PRESSURE: 74 MMHG | HEART RATE: 101 BPM | WEIGHT: 160 LBS | BODY MASS INDEX: 24.33 KG/M2 | OXYGEN SATURATION: 98 % | SYSTOLIC BLOOD PRESSURE: 117 MMHG

## 2024-07-24 DIAGNOSIS — N39.0 RECURRENT UTI: Primary | ICD-10-CM

## 2024-07-24 DIAGNOSIS — G89.3 CHRONIC PAIN DUE TO NEOPLASM: ICD-10-CM

## 2024-07-24 DIAGNOSIS — F41.9 ANXIETY: Primary | ICD-10-CM

## 2024-07-24 DIAGNOSIS — M79.2 NEUROPATHIC PAIN: ICD-10-CM

## 2024-07-24 PROCEDURE — 99214 OFFICE O/P EST MOD 30 MIN: CPT | Performed by: NURSE PRACTITIONER

## 2024-07-24 PROCEDURE — G0463 HOSPITAL OUTPT CLINIC VISIT: HCPCS | Performed by: NURSE PRACTITIONER

## 2024-07-24 RX ORDER — HYDROXYZINE PAMOATE 25 MG/1
25-50 CAPSULE ORAL 4 TIMES DAILY PRN
Qty: 240 CAPSULE | Refills: 2 | Status: SHIPPED | OUTPATIENT
Start: 2024-07-24 | End: 2024-10-22

## 2024-07-24 RX ORDER — OXYCODONE HYDROCHLORIDE 5 MG/1
5 TABLET ORAL 3 TIMES DAILY PRN
Qty: 90 TABLET | Refills: 0 | Status: SHIPPED | OUTPATIENT
Start: 2024-07-24 | End: 2024-08-19

## 2024-07-24 ASSESSMENT — PAIN SCALES - GENERAL: PAINLEVEL: SEVERE PAIN (6)

## 2024-07-24 NOTE — PROGRESS NOTES
Orders for UA/UC placed and patient notified by phone.    Thank you,  Ellen Jackson RN, BSN Urology Triage

## 2024-07-24 NOTE — PATIENT INSTRUCTIONS
Plan on 07/24/2024:    Refill oxycodone 5mg TID 90 tabs for 30 days.  Refill hydroxyzine 25mg 1-2 tabs QID    Follow-up in three months in clinic.        Desi Amaya [facility] Number:  [phone]  Call with any questions about your care and for scheduling assistance.   Calls are returned Monday through Friday between 8 AM and 4:30 PM. We usually get back to you within 2 business days depending on the issue/request.    If we are prescribing your medications:  For opioid medication refills, call the clinic or send a [employer] message 7 days in advance.  Please include:  Name of requested medication  Name of the pharmacy.  For non-opioid medications, call your pharmacy directly to request a refill. Please allow 3-4 days to be processed.   Per [place] State Law:  All controlled substance prescriptions must be filled within 30 days of being written.    For those controlled substances allowing refills, pickup must occur within 30 days of last fill.      We believe regular attendance is key to your success in our program!    Any time you are unable to keep your appointment we ask that you call us at least 24 hours in advance to cancel.This will allow us to offer the appointment time to another patient.   Multiple missed appointments may lead to dismissal from the clinic.

## 2024-07-24 NOTE — PROGRESS NOTES
Patient presents to the clinic today for a visit with NINA Kirkpatrick CNP regarding Pain Management.          6/7/2024    11:30 AM 6/24/2024    11:21 AM 7/24/2024    10:49 AM   PEG Score   PEG Total Score 7.33 6.67 5.33       UDS/CSA- 11.03.2023    Medications- Oxycodone 5 mg last taken early this morning. She is having cramps behind her shins.    Notes Neuropathy has been bad down her legs. Right arm down into her hand is numb and sore.     Mother in law is in hospice    [facility] Clinical Assistant

## 2024-07-29 ENCOUNTER — LAB (OUTPATIENT)
Dept: LAB | Facility: HOSPITAL | Age: 59
End: 2024-07-29
Payer: MEDICARE

## 2024-07-29 DIAGNOSIS — M79.2 NEUROPATHIC PAIN: ICD-10-CM

## 2024-07-29 DIAGNOSIS — N39.0 RECURRENT UTI: ICD-10-CM

## 2024-07-29 LAB
ALBUMIN UR-MCNC: 20 MG/DL
APPEARANCE UR: ABNORMAL
BILIRUB UR QL STRIP: NEGATIVE
COLOR UR AUTO: YELLOW
GLUCOSE UR STRIP-MCNC: NEGATIVE MG/DL
HGB UR QL STRIP: ABNORMAL
KETONES UR STRIP-MCNC: NEGATIVE MG/DL
LEUKOCYTE ESTERASE UR QL STRIP: ABNORMAL
NITRATE UR QL: POSITIVE
PH UR STRIP: 6 [PH] (ref 5–7)
RBC URINE: 9 /HPF
SP GR UR STRIP: 1.02 (ref 1–1.03)
SQUAMOUS EPITHELIAL: 1 /HPF
UROBILINOGEN UR STRIP-MCNC: <2 MG/DL
WBC CLUMPS #/AREA URNS HPF: PRESENT /HPF
WBC URINE: >182 /HPF

## 2024-07-29 PROCEDURE — 87186 SC STD MICRODIL/AGAR DIL: CPT

## 2024-07-29 PROCEDURE — 87086 URINE CULTURE/COLONY COUNT: CPT

## 2024-07-29 PROCEDURE — 81001 URINALYSIS AUTO W/SCOPE: CPT

## 2024-07-29 RX ORDER — AMITRIPTYLINE HYDROCHLORIDE 75 MG/1
75 TABLET ORAL AT BEDTIME
Qty: 30 TABLET | Refills: 3 | Status: SHIPPED | OUTPATIENT
Start: 2024-07-29

## 2024-07-29 NOTE — TELEPHONE ENCOUNTER
Received fax from pharmacy requesting refill(s) for     amitriptyline (ELAVIL) 75 MG tablet    Date last filled 06/29/2024    Last Appt Date:07/24/2024    Next Appt scheduled: 10/21/2024    Pharmacy:   [id number] [place] [facility] [place] [address] [address] route for processing  Dorene Benjamin MA  [facility] [place]

## 2024-07-30 ENCOUNTER — VIRTUAL VISIT (OUTPATIENT)
Dept: FAMILY MEDICINE | Facility: CLINIC | Age: 59
End: 2024-07-30
Payer: MEDICARE

## 2024-07-30 ENCOUNTER — NURSE TRIAGE (OUTPATIENT)
Dept: FAMILY MEDICINE | Facility: CLINIC | Age: 59
End: 2024-07-30

## 2024-07-30 ENCOUNTER — TELEPHONE (OUTPATIENT)
Dept: UROLOGY | Facility: CLINIC | Age: 59
End: 2024-07-30

## 2024-07-30 DIAGNOSIS — Z90.410 H/O WHIPPLE PROCEDURE: ICD-10-CM

## 2024-07-30 DIAGNOSIS — N39.0 UTI (URINARY TRACT INFECTION): Primary | ICD-10-CM

## 2024-07-30 DIAGNOSIS — F11.20 CONTINUOUS OPIOID DEPENDENCE (H): ICD-10-CM

## 2024-07-30 DIAGNOSIS — C16.9 GASTRIC ADENOCARCINOMA (H): ICD-10-CM

## 2024-07-30 DIAGNOSIS — U07.1 INFECTION DUE TO 2019 NOVEL CORONAVIRUS: Primary | ICD-10-CM

## 2024-07-30 DIAGNOSIS — Z90.49 H/O WHIPPLE PROCEDURE: ICD-10-CM

## 2024-07-30 PROCEDURE — 99214 OFFICE O/P EST MOD 30 MIN: CPT | Mod: 95 | Performed by: NURSE PRACTITIONER

## 2024-07-30 PROCEDURE — G2211 COMPLEX E/M VISIT ADD ON: HCPCS | Mod: 95 | Performed by: NURSE PRACTITIONER

## 2024-07-30 NOTE — PROGRESS NOTES
"Nathalie is a 58 year old who is being evaluated via a billable video visit.    How would you like to obtain your AVS? MyChart  If the video visit is dropped, the invitation should be resent by: Text to cell phone: [phone]  Will anyone else be joining your video visit? No    Assessment & Plan     Infection due to 2019 novel coronavirus  Symptoms started 7/25/24.Positive COVID test 7/30/24.  Reassuring exam no higher level of care felt to be needed.   Patient is considered high risk.  All medications reviewed: Can't take Paxlovid daily opoid use.   Molnupiravir (LAGEVRIO) prescribed.   If develops symptoms such as shortness of breath chest pain decreased oxygen saturation weakness high fever etc. should be seen in ED or call 911.  Quarantine and symptom control at home discussed. Questions answered.   Written education provided.   Nathalie verbalizes understanding of plan of care and is in agreement.        Continuous opioid dependence (H)  Gastric adenocarcinoma (H)  H/O Whipple procedure              BMI  Estimated body mass index is 24.33 kg/m  as calculated from the following:    Height as of 4/27/24: 1.727 m (5' 8\").    Weight as of 7/24/24: 72.6 kg (160 lb).     Return in about 1 week (around 8/6/2024) for if symptoms persist or worsening please be seen.     Subjective   Nathalie is a 58 year old, presenting for the following health issues:  COVID Treatment        7/30/2024     3:05 PM   Additional Questions   Roomed by Trinidad BAKER   Accompanied by Self       Video Start Time: 330 PM    HPI       Triage note from 7/30/2024  Patient had positive at home covid test today.  States has dry cough, fever, runny nose, fatigue.  Denies any resp distress or shortness of breath.     Does not have PCP within  so scheduled for virtual visit today to discuss treatment options.      Reason for Disposition   HIGH RISK patient (e.g., weak immune system, age > 64 years, obesity with BMI of 30 or higher, pregnant, chronic lung " "disease or other chronic medical condition) and COVID symptoms (e.g., cough, fever)  (Exceptions: Already seen by doctor or NP/PA and no new or worsening symptoms.)    Answer Assessment - Initial Assessment Questions  1. COVID-19 DIAGNOSIS: \"How do you know that you have COVID?\" (e.g., positive lab test or self-test, diagnosed by doctor or NP/PA, symptoms after exposure).      Home test  2. COVID-19 EXPOSURE: \"Was there any known exposure to COVID before the symptoms began?\" CDC Definition of close contact: within 6 feet (2 meters) for a total of 15 minutes or more over a 24-hour period.       na  3. ONSET: \"When did the COVID-19 symptoms start?\"       Saturday  4. WORST SYMPTOM: \"What is your worst symptom?\" (e.g., cough, fever, shortness of breath, muscle aches)      Runny nose, fatigue, headaches, back pain  5. COUGH: \"Do you have a cough?\" If Yes, ask: \"How bad is the cough?\"        Dry cough  6. FEVER: \"Do you have a fever?\" If Yes, ask: \"What is your temperature, how was it measured, and when did it start?\"      na  7. RESPIRATORY STATUS: \"Describe your breathing?\" (e.g., normal; shortness of breath, wheezing, unable to speak)       no  8. BETTER-SAME-WORSE: \"Are you getting better, staying the same or getting worse compared to yesterday?\"  If getting worse, ask, \"In what way?\"      worse  9. OTHER SYMPTOMS: \"Do you have any other symptoms?\"  (e.g., chills, fatigue, headache, loss of smell or taste, muscle pain, sore throat)      headaches  10. HIGH RISK DISEASE: \"Do you have any chronic medical problems?\" (e.g., asthma, heart or lung disease, weak immune system, obesity, etc.)        yes  11. VACCINE: \"Have you had the COVID-19 vaccine?\" If Yes, ask: \"Which one, how many shots, when did you get it?\"        na  12. PREGNANCY: \"Is there any chance you are pregnant?\" \"When was your last menstrual period?\"        na  13. O2 SATURATION MONITOR:  \"Do you use an oxygen saturation monitor (pulse oximeter) at home?\" " "If Yes, ask \"What is your reading (oxygen level) today?\" \"What is your usual oxygen saturation reading?\" (e.g., 95%)        na    Protocols used: Coronavirus (COVID-19) Diagnosed or [id number]    Constitutional, HEENT, cardiovascular, pulmonary, GI, , musculoskeletal, neuro, skin, endocrine and psych systems are negative, except as otherwise noted in the HPI.       Objective           Vitals:  No vitals were obtained today due to virtual visit.    Physical Exam   GENERAL: alert and no distress  EYES: Eyes grossly normal to inspection.  No discharge or erythema, or obvious scleral/conjunctival abnormalities.  RESP: No audible wheeze, cough, or visible cyanosis.    SKIN: Visible skin clear. No significant rash, abnormal pigmentation or lesions.  NEURO: Cranial nerves grossly intact.  Mentation and speech appropriate for age.  PSYCH: Appropriate affect, tone, and pace of words    Video-Visit Details    Type of service:  Video Visit   Video End Time:3:55 PM  Originating Location (pt. Location): Home  Distant Location (provider location):  On-site  Platform used for Video Visit: Miguel A     Signed Electronically by: NINA Morse CNP    "

## 2024-07-30 NOTE — TELEPHONE ENCOUNTER
[facility] Call Center    Phone Message    May a detailed message be left on voicemail: yes     Reason for Call: Other: Pt sent a [employer] message today and now called. Pt is having more symptoms of UTI and it's been going on for a week. She did try the laxative and that wasn't helpful. She now has covid too. She's wondering if you can send in a prescription. Please call pt to discuss. (Pt has a VV at 3:25 to 4:00 today with another provider) Thank you!     Action Taken: Message routed to:  [facility] urology    Travel Screening: Not Applicable     Date of Service:

## 2024-07-30 NOTE — TELEPHONE ENCOUNTER
"Patient had positive at home covid test today.  States has dry cough, fever, runny nose, fatigue.  Denies any resp distress or shortness of breath.     Does not have PCP within  so scheduled for virtual visit today to discuss treatment options.     Reason for Disposition   HIGH RISK patient (e.g., weak immune system, age > 64 years, obesity with BMI of 30 or higher, pregnant, chronic lung disease or other chronic medical condition) and COVID symptoms (e.g., cough, fever)  (Exceptions: Already seen by doctor or NP/PA and no new or worsening symptoms.)    Answer Assessment - Initial Assessment Questions  1. COVID-19 DIAGNOSIS: \"How do you know that you have COVID?\" (e.g., positive lab test or self-test, diagnosed by doctor or NP/PA, symptoms after exposure).      Home test  2. COVID-19 EXPOSURE: \"Was there any known exposure to COVID before the symptoms began?\" [facility] Definition of close contact: within 6 feet (2 meters) for a total of 15 minutes or more over a 24-hour period.       na  3. ONSET: \"When did the COVID-19 symptoms start?\"       Saturday  4. WORST SYMPTOM: \"What is your worst symptom?\" (e.g., cough, fever, shortness of breath, muscle aches)      Runny nose, fatigue, headaches, back pain  5. COUGH: \"Do you have a cough?\" If Yes, ask: \"How bad is the cough?\"        Dry cough  6. FEVER: \"Do you have a fever?\" If Yes, ask: \"What is your temperature, how was it measured, and when did it start?\"      na  7. RESPIRATORY STATUS: \"Describe your breathing?\" (e.g., normal; shortness of breath, wheezing, unable to speak)       no  8. BETTER-SAME-WORSE: \"Are you getting better, staying the same or getting worse compared to yesterday?\"  If getting worse, ask, \"In what way?\"      worse  9. OTHER SYMPTOMS: \"Do you have any other symptoms?\"  (e.g., chills, fatigue, headache, loss of smell or taste, muscle pain, sore throat)      headaches  10. HIGH RISK DISEASE: \"Do you have any chronic medical problems?\" (e.g., asthma, " "heart or lung disease, weak immune system, obesity, etc.)        yes  11. VACCINE: \"Have you had the COVID-19 vaccine?\" If Yes, ask: \"Which one, how many shots, when did you get it?\"        na  12. PREGNANCY: \"Is there any chance you are pregnant?\" \"When was your last menstrual period?\"        na  13. O2 SATURATION MONITOR:  \"Do you use an oxygen saturation monitor (pulse oximeter) at home?\" If Yes, ask \"What is your reading (oxygen level) today?\" \"What is your usual oxygen saturation reading?\" (e.g., 95%)        na    Protocols used: Coronavirus (COVID-19) Diagnosed or [id number]    "

## 2024-07-31 LAB
BACTERIA UR CULT: ABNORMAL
BACTERIA UR CULT: ABNORMAL

## 2024-07-31 RX ORDER — CIPROFLOXACIN 500 MG/1
500 TABLET, FILM COATED ORAL 2 TIMES DAILY
Qty: 10 TABLET | Refills: 0 | Status: SHIPPED | OUTPATIENT
Start: 2024-07-31 | End: 2024-08-05

## 2024-07-31 NOTE — TELEPHONE ENCOUNTER
Called patient and LM. MD would like to start patient on Cipro 500mg BID X 5 days. Sent to [facility] in [place].     Neeta Ace LPN

## 2024-09-15 NOTE — PROGRESS NOTES
Connected Care Resource Center:   [facility] Contact  [facility]/Voicemail     Clinical Data: Post-Discharge Outreach     Outreach attempted x 2.  Left message on patient's voicemail, providing [facility]'s central phone number of [phone] ([phone]) for questions/concerns and/or to schedule an appt with an [facility] provider, if they do not have a PCP.      Plan:  [facility] will do no further outreaches at this time.       Xochilt Gustafson MA  Connected Care Resource Center, [facility]    *Connected Care Resource Team does NOT follow patient ongoing. Referrals are identified based on internal discharge reports and the outreach is to ensure patient has an understanding of their discharge instructions.     Oncology follow up visit:  Date on this visit: 6/4/2020     Nathalie Bashir  is referred by Dr.Sara Gonzalez for an oncology consultation. She requires evaluation for new diagnosis of Gastric cancer.    Primary Physician: Marianne Gonzalez     History Of Present Illness:    Please see my previous note for details.  I have copied and updated from prior note.    Ms. Bashir is a 54 year old female who comes in today for newly diagnosed gastric adenocarcinoma.  She tells me that she was being followed for a pancreatic duct dilatation and pancreatic cyst which was noted in November 2018.  Since May 2019 she started noticing some nausea and vomiting and occasional abdominal discomfort.    She had an ultrasound in August 2019 which was essentially unremarkable.  She had a repeat endoscopic ultrasound on 10/29/2019 which showed increase in size of the cyst as well as dilation of the main pancreatic duct.  FNA and fluid analysis showed mucinous epithelium.  She was referred to Dr. 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  Surprisingly there was no pancreatic ductal neoplasm seen but on the resected specimen stomach cancer was noted.  It was poorly differentiated adenocarcinoma with poorly cohesive/signet ring cell type with proximal gastric mucosal and serosal margins being positive.  There was lymphovascular and perineural invasion seen.  22 lymph nodes were sampled and all were benign.  It was a pT4aN0 lesion.  HER-2/christine FISH was not amplified.  Because of the surprising finding she was referred to medical oncology.  Prior to that she was seen by Dr. Mallory who recommended and arranged further restaging procedure with EUS and a PET scan but because of recent surgery, the timing of the PET scan was moved forward so that it is at least 6 weeks out from the surgery so that it does not interfere with postsurgical inflammation.      EUS on 3/3/2020 showed:     Postop changes consistent with  prior non-pylorus                        sparing Whipple resection. Some mucosal nodularity along                        the lesser curvature with scattered exudate. By                        ultrasound this appears to correspond to a staple or                        suture line. No gross evidence of neoplasm, however the                        previous neoplasm was infiltrative and may not be                        readily seen endoscopically.                        Non-specific loss of sonographic wall layers along the                        lesser curvature in this region adjacent to apparent                        suture. This is potentially consistent with                        post-operative edema, however it is not possible to                        completely exclude infiltrative tumor in this region.                        There was no target for needle aspiration (wall 3 mm in                        this region). Forceops biopsies obtained from this                        region and separately along the gastrojejunal                        anastomosis.                        - Two prominent left gastric artery lymph nodes. Needle                        aspiration performed and preliminary cytology suggested                        benign lymphoid material.                        - No liver lesions seen in the visualized portion                        (limited by post-op changes).     Left gastric lymph node fine-needle aspiration was negative for carcinoma.  Biopsy from the gastric jejunal anastomosis as well as lesser curvature of the stomach also did not show any malignancy.      She had a PET/CT on 3/13/2020 which showed soft tissue streaky density with increased FDG uptake adjacent to the pancreaticojejunostomy which could be neoplastic in origin.  There is mild increased FDG uptake in the gastric remnant.  Focal area of soft tissue thickening with fatty streakiness along medial laparotomy with  increased FDG uptake which likely is inflammatory.  Increased FDG uptake in thoracic esophagus which could be esophagitis.    I discussed with Dr Mallory and he also believes that we should go ahead with chemotherapy as the increased FDG activity around the pancreatojejunostomy is post surgical and likely inflammatory. As per him, she probably had a very small leak that formed a phlegmon. and it would be okay to start chemotherapy as planned and we can look at new scans in a couple of months.     She started on chemotherapy with 5FU, oxaliplatin, and Taxotere on 3/27/20. She was seen on 4/7/20 for evaluation of fevers. She was treated for possible pneumonia with Levaquin.      She was seen in clinic 4/13/20 for RUQ pain,CT and labs reassuring.     C#2 4/20/2020    C#3 5/4/2020    She then was admitted with neutropenic aspiration pneumonia from 5/21/2020 till 5/23/2020.  She was initially started on ceftriaxone azithromycin and Flagyl but then clindamycin was given because of concern for aspiration pneumonia.      Cycle #4 5/27/2020 ( with Onpro supoprt ).    Repeat PET/CT on 6/3/2020 shows mild residual uptake at the site of gastrojejunal anastomosis which likely is physiologic.  There is almost resolution of soft tissue nodule next to the pancreaticoduodenal anastomosis without FDG uptake.  There is focal increased uptake in the left posterior acetabulum which could be artifact.    Plan for cycle #5 on 6/11/2020.    Interval hx:  This is a video visit.  She had some nausea and occasional vomiting with this chemotherapy.  She took Zofran which helped.  She also was having diarrhea and she has been taking 1-2 Imodium daily.  Overall energy is a little down but she still remains decently functional.  Denies any fevers or infection.  Pain is under decent control with tramadol every 6 hours.  She notices some tingling and numbness in her feet and she believes that the neuropathy in the hand is getting better.  No  bleeding.  No shortness of breath.  No mouth sores.    ECOG 1-2    ROS:  Rest of the comprehensive review of the system was essentially unremarkable.          I reviewed other history in epic as below.    Past Medical/Surgical History:  Past Medical History:   Diagnosis Date     Allergic rhinitis      Cancer (H)     stomach cancer     Depression      Lumbago      Migraine      Other chronic pain     low back     Sacroiliitis (H)     low back pain     Trochanteric bursitis     leg length discrrepancy, hip pain     She has history of left leg trauma when she was 4 years of age and since then she has had issues with the left leg length and developed arthritis in the left hip requiring left hip replacement in 2016.  She also had left sacroiliac joint fusion several years ago.      Past Surgical History:   Procedure Laterality Date     ARTHROPLASTY HIP Left 2016     BACK SURGERY      L4-5 decompression     C REPAIR DETACH RETINA,SCLERAL BUCKLE       CATARACT IOL, RT/LT       CHOLECYSTECTOMY N/A 1/28/2020    Procedure: Cholecystectomy;  Surgeon: Yandel Mallory MD;  Location:  OR     ESOPHAGOSCOPY, GASTROSCOPY, DUODENOSCOPY (EGD), COMBINED N/A 3/3/2020    Procedure: ESOPHAGOGASTRODUODENOSCOPY, WITH ENDOSCOPIC US (enoxaparin);  Surgeon: Bakari Plata MD;  [facility]: [facility]     EYE SURGERY       IR CHEST PORT PLACEMENT > 5 YRS OF AGE  3/26/2020     OPEN REDUCTION INTERNAL FIXATION RODDING INTRAMEDULLARY FEMUR Left 12/23/2014    Procedure: OPEN REDUCTION INTERNAL FIXATION RODDING INTRAMEDULLARY FEMUR;  Surgeon: Arnol Santiago MD;  Location:  OR     ORTHOPEDIC SURGERY       PICC DOUBLE LUMEN PLACEMENT Right 02/07/2020    5 fr double lumen 41 cm     REMOVE HARDWARE LOWER EXTREMITY Left 4/7/2015    Procedure: REMOVE HARDWARE LOWER EXTREMITY;  Surgeon: Arnol Santiago MD;  Location:  OR     REMOVE HARDWARE RODDING INTRAMEDULLARY FEMUR Left 12/17/2015    Procedure: REMOVE HARDWARE RODDING INTRAMEDULLARY FEMUR;   Surgeon: Arnol Santiago MD;  Location: [facility]     RESECT BONE LOWER EXTREMITY Left 12/23/2014    Procedure: RESECT BONE LOWER EXTREMITY;  Surgeon: Arnol Santiago MD;  Location: [facility]     WHIPPLE PROCEDURE N/A 1/28/2020    Procedure: Open Whipple procedure and Cholecystectomy;  Surgeon: Yandel Mallory MD;  [facility]: [facility]     Cancer History:   As above    Allergies:  Allergies as of 06/04/2020 - Reviewed 06/04/2020   Allergen Reaction Noted     Gluten meal Palpitations 01/17/2020     Augmentin Rash 02/27/2020     Oxycontin [oxycodone]  01/17/2020     Pregabalin  12/15/2015     Topiramate  04/04/2016     Acetaminophen Nausea 07/13/2017     Dust mite extract  12/15/2015     Gabapentin Dizziness and GI Disturbance 12/15/2015     Latex Rash 12/19/2014     Methadone Fatigue 12/15/2015     Mold  12/15/2015     Naprosyn [naproxen] Dizziness and GI Disturbance 12/15/2015     Oxymetholone  01/17/2020     Seasonal allergies  12/15/2015     Current Medications:  Current Outpatient Medications   Medication Sig Dispense Refill     amitriptyline (ELAVIL) 25 MG tablet Take 3 tablets by mouth At Bedtime       calcium carb 1250 mg, 500 mg [place],/vitamin D 200 units (OSCAL WITH D) 500-200 MG-UNIT per tablet Take 1 tablet by mouth 3 times daily (with meals)       citalopram (CELEXA) 20 MG tablet Take 20 mg by mouth every morning        dexamethasone (DECADRON) 4 MG tablet Take 1 tablet (4mg) the day before, day of, and 2 days after chemotherapy 12 tablet 3     diclofenac (FLECTOR) 1.3 % patch Place 1 patch onto the skin daily as needed        famotidine (PEPCID) 20 MG tablet Take 1 tablet (20 mg) by mouth 2 times daily 60 tablet 3     fluticasone (FLONASE) 50 MCG/ACT nasal spray Spray 2 sprays into both nostrils daily as needed        hydrOXYzine (ATARAX) 10 MG tablet Take 10 mg by mouth 3 times daily as needed for itching       LORazepam (ATIVAN) 0.5 MG tablet Take 1 tablet (0.5 mg) by mouth every 4 hours as needed (Anxiety,  Nausea/Vomiting or Sleep) 30 tablet 5     meclizine (ANTIVERT) 25 MG tablet Take 1 tablet (25 mg) by mouth 3 times daily as needed for dizziness 30 tablet 0     multivitamin, therapeutic with minerals (MULTI-VITAMIN) TABS Take 1 tablet by mouth daily       OLANZapine zydis (ZYPREXA) 5 MG ODT Take 1 tablet (5 mg) by mouth At Bedtime 30 tablet 0     ondansetron (ZOFRAN) 8 MG tablet Take 1 tablet (8 mg) by mouth every 8 hours as needed (Nausea/Vomiting) 10 tablet 7     ondansetron (ZOFRAN-ODT) 4 MG ODT tab Take 1 tablet (4 mg) by mouth every 6 hours as needed for nausea or vomiting 90 tablet 3     pantoprazole (PROTONIX) 40 MG EC tablet Take 1 tablet (40 mg) by mouth 2 times daily 60 tablet 1     senna-docusate (SENOKOT-S/PERICOLACE) 8.6-50 MG tablet Take 1 tablet by mouth daily as needed for constipation 30 tablet 0     simethicone 80 MG TABS Take 1 tablet by mouth every 6 hours as needed (bloating, gas, belching) 90 tablet 3     SUMAtriptan (IMITREX) 100 MG tablet Take 100 mg by mouth as needed       tiZANidine (ZANAFLEX) 4 MG tablet Take 1 tablet by mouth 2 times daily as needed       traMADol (ULTRAM) 50 MG tablet Take 1 tablet (50 mg) by mouth every 6 hours as needed for severe pain 60 tablet 0      Family History:  Family History   Problem Relation Age of Onset     Heart Failure Mother 77     Anesthesia Reaction No family hx of      Deep Vein Thrombosis No family hx of    Paternal grandmother had uterine cancer at 97.  Patient has 1 son who is healthy.      Social History:  Social History     Socioeconomic History     Marital status:      Spouse name: Not on file     Number of children: Not on file     Years of education: Not on file     Highest education level: Not on file   Occupational History     Not on file   Social Needs     Financial resource strain: Not on file     Food insecurity     Worry: Not on file     Inability: Not on file     Transportation needs     Medical: Not on file     Non-medical:  Not on file   Tobacco Use     Smoking status: Former Smoker     Packs/day: 0.50     Years: 25.00     Pack years: 12.50     Types: Cigarettes     Last attempt to quit: 2020     Years since quittin.4     Smokeless tobacco: Never Used   Substance and Sexual Activity     Alcohol use: No     Drug use: No     Sexual activity: Not on file   Lifestyle     Physical activity     Days per week: Not on file     Minutes per session: Not on file     Stress: Not on file   Relationships     Social connections     Talks on phone: Not on file     Gets together: Not on file     Attends Nondenominational service: Not on file     Active member of club or organization: Not on file     Attends meetings of clubs or organizations: Not on file     Relationship status: Not on file     Intimate partner violence     Fear of current or ex partner: Not on file     Emotionally abused: Not on file     Physically abused: Not on file     Forced sexual activity: Not on file   Other Topics Concern     Parent/sibling w/ CABG, MI or angioplasty before 65F 55M? Not Asked   Social History Narrative     Not on file     She used to smoke but quit on the date of her surgery.  Denies alcohol use.  Lives with her , son and 3 grandkids.    Physical Exam:  LMP 2014      Wt Readings from Last 4 Encounters:   20 83.6 kg (184 lb 3.2 oz)   20 83.7 kg (184 lb 9.6 oz)   20 80.5 kg (177 lb 6.4 oz)   20 81.8 kg (180 lb 4.8 oz)           Constitutional.  Looks well and in no apparent distress.   Eyes.  Without eye redness or apparent jaundice.   Respiratory.  Non labored breathing. Speaking in full sentences.    Skin.  No concerning skin rashes on the skin visualized.   Neurological.  Is alert and oriented.  Psychiatric.  Mood and affect seem appropriate.      The rest of a comprehensive physical examination is deferred due to Public Health Emergency video visit restrictions.    Laboratory/Imaging Studies      Reviewed    Combined  Report of:    PET and CT on  6/3/2020 9:45 AM :     1. PET of the neck, chest, abdomen, and pelvis.  2. PET CT Fusion for Attenuation Correction and Anatomical  Localization:    3. Diagnostic CT scan of the chest, abdomen, and pelvis with  intravenous contrast for interpretation.  3. CT of the chest, abdomen and pelvis obtained for diagnostic  interpretation.  4. 3D MIP and PET-CT fused images were processed on an independent  workstation and archived to PACS and reviewed by a radiologist.     Technique:     1. PET: The patient received 10.8 mCi of F-18-FDG; the serum glucose  was 114 prior to administration, body weight was 83.6 kg. Images were  evaluated in the axial, sagittal, and coronal planes as well as the  rotational whole body MIP. Images were acquired from the Vertex to the  Feet.     UPTAKE WAS MEASURED AT 60 MINUTES.      BACKGROUND:  Liver SUV max= 5.01,   Aorta Blood SUV Max: 2.57.      2. CT: Volumetric acquisition for clinical interpretation of the  chest, abdomen, and pelvis acquired at 3 mm sections . The chest,  abdomen, and pelvis were evaluated at 5 mm sections in bone, soft  tissue, and lung windows.       The patient received 112 cc. Of Isovue 370 intravenously for the  examination.    --     3. 3D MIP and PET-CT fused images were processed on an independent  workstation and archived to PACS and reviewed by a radiologist.     INDICATION: poorly differentiated signet ring type gastric  adenocarcinoma,; Signet-ring cell carcinoma of stomach (H); Gastric  adenocarcinoma (H); Malignant neoplasm of body of stomach (H)     ADDITIONAL INFORMATION OBTAINED FROM EMR: on chemo     COMPARISON: 5/21/2020, 4/13/2020     FINDINGS:      HEAD/NECK:  Stable few bilateral lung nodules. Slightly increased uptake in the  bilateral palatine tonsils with SUV max up to 6.36, likely  inflammatory. Patent main neck vessels. No suspicious lymphadenopathy.  The salivary glands and thyroid are normal. Paranasal sinuses  and  mastoid air cells are clear.     CHEST:  No suspicious lung nodules. Stable atelectasis of the left lung base.  No suspicious lymphadenopathy. Heart is normal. Increased uptake in  the esophagus, likely inflammatory. Right chest port with distal tip  at the cavoatrial junction.     ABDOMEN AND PELVIS:  Liver, spleen, kidneys, adrenal glands are normal. Postsurgical  changes of cholecystectomy. Postsurgical changes of Whipple.  Pancreatic duct dilation. There is interval decrease of soft tissue  next to the pancreaticoduodenal anastomosis without significant  residual uptake. Postsurgical changes of partial gastrectomy with  residual FDG uptake close to the gastrojejunal anastomosis with SUV  max of 7.57. No suspicious residual lymphadenopathy. Circumferential  thickening of the urinary bladder. No suspicious abdominal  lymphadenopathy. Patent main abdominal vessels.     LOWER EXTREMITIES:   No abnormal masses or hypermetabolic lesions.     BONES:   There are no suspicious lytic or blastic osseous lesions.  There is no  abnormal FDG uptake in the skeleton. Diffuse bone marrow activation,  likely related to treatment. Left total hip arthroplasty. Diffuse bone  marrow activation. Left sacroiliac arthrodesis. Focal increased uptake  in the left posterior acetabulum with SUV max of 7.94.                                                                         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 Residual uptake at the site of gastrojejunal anastomosis, could be  physiological, however consider endoscopy for confirmation.  2. Almost resolved soft tissue nodule next to the pancreaticoduodenal  anastomosis without FDG uptake.  3. Focal increased uptake in the left posterior acetabulum may be  artifact. Attention on follow up.      ASSESSMENT/PLAN:    Incidentally found poorly differentiated signet ring type gastric adenocarcinoma, HER-2 negative.  This  was found on Whipple surgery specimen as it was done for presumed pancreatic IPMN although no pancreatic neoplasm was found.  It was a pT4aN0 lesion with lymphovascular invasion and perineural invasion seen.  All 22 lymph nodes were negative.    EUS which was done on 3/3/2020 did not show any convincing evidence of malignancy.  Left gastric lymph node fine-needle aspiration was negative for carcinoma.  Biopsy from the gastric jejunal anastomosis as well as lesser curvature of the stomach also did not show any malignancy.      She had a PET/CT on 3/13/2020 which showed soft tissue streaky density with increased FDG uptake adjacent to the pancreaticojejunostomy which could be neoplastic in origin.  Other findings were likely reactive.  No other evidence of metastatic disease.    I discussed with Dr Mallory and he also believes that we should go ahead with chemotherapy as the increased FDG activity around the pancreatojejunostomy is post surgical and likely inflammatory. As per him, she probably had a very small leak that formed a phlegmon. and it would be okay to start chemotherapy as planned and we can look at new scans in a couple of months.     She started FLOT chemotherapy on 3/27/2020.  Cycle #4 was given on 5/27/2020 with growth factor support.    Repeat PET/CT on 6/3/2020 shows mild residual uptake at the site of gastrojejunal anastomosis which likely is physiologic.  There is almost resolution of soft tissue nodule next to the pancreaticoduodenal anastomosis without FDG uptake.  There is focal increased uptake in the left posterior acetabulum which could be artifact.    Overall she is doing fairly well.  I discussed this with case with Dr. Mallory and I discussed with her in detail as well.  My recommendation would be to continue 4 more cycles of chemotherapy to complete all chemotherapy in the neoadjuvant setting as a substantial number of patients are unable to get adjuvant chemotherapy after surgery.  We  discussed the pros and cons of this approach.  We also discussed that there is a chance that with this approach she might not remain a surgical candidate but I believe that giving her the chance of complete treatment with full dose chemotherapy as well as surgery offers the best chance of a cure so because of this reason I recommend completing the chemotherapy before surgery.  We will plan on giving her 4 more cycles and then repeat PET scan.  Hopefully after that she will undergo surgery.    Neutropenic fever.  She had neutropenic fever with aspiration pneumonia and she was discharged on 5/23/2020.  She has now recovered.  We will continue Neulasta support.     Nausea and vomiting.  Continue antiemetics as needed.    Diarrhea.  I discussed that she should use Imodium more liberally to control diarrhea.    Neuropathy/cold sensitivity.  From oxaliplatin as well as docetaxel.  Right now it seems that the neuropathy is improving.  We will reassess prior to next chemotherapy to see if any dose adjustments are needed.      Left upper quadrant pain.  Continue as needed tramadol.  She is followed with pain clinic/palliative care.     Anemia.  Anemia is from a combination of cancer, surgery as well as chemotherapy.  Continue to monitor.      We did not address the following today.    Right foot injury.  She fell last week without any loss of consciousness.  She hit the right foot and has pain and swelling in the right greater toe.  Foot x-ray which was done yesterday did not show any fractures.  This is likely more of soft tissue injury.  She will continue symptomatic management with applying ice and hopefully this will get better.      Genetic testing.  This was negative.      RTC as scheduled    I answered all of her questions to her satisfaction.  She is agreeable and comfortable with the plan.      Magaly Pollard MD

## 2024-09-16 ENCOUNTER — MYC REFILL (OUTPATIENT)
Dept: PALLIATIVE MEDICINE | Facility: OTHER | Age: 59
End: 2024-09-16
Payer: MEDICARE

## 2024-09-16 DIAGNOSIS — F41.9 ANXIETY: ICD-10-CM

## 2024-09-16 DIAGNOSIS — G89.3 CHRONIC PAIN DUE TO NEOPLASM: ICD-10-CM

## 2024-09-16 DIAGNOSIS — M79.2 NEUROPATHIC PAIN: ICD-10-CM

## 2024-09-16 RX ORDER — HYDROXYZINE PAMOATE 25 MG/1
25-50 CAPSULE ORAL 4 TIMES DAILY PRN
Qty: 240 CAPSULE | Refills: 2 | Status: CANCELLED | OUTPATIENT
Start: 2024-09-16

## 2024-09-16 NOTE — TELEPHONE ENCOUNTER
Received call from patient requesting refill(s) of oxyCODONE (ROXICODONE) 5 MG tablet    Last dispensed from pharmacy on 8/19/2024.    Patient's last office/virtual visit by prescribing provider on 7/24/2024.  Next office/virtual appointment scheduled for 10/21/2024.    Last urine drug screen date 11/3/2023.  Current opioid agreement on file (completed within the last year) Yes Date of opioid agreement: 11/3/2023.    E-prescribe to:    [facility] [place], [place] - [address] [facility]    Will route to nursing [place] for review and preparation of prescription(s).

## 2024-09-16 NOTE — TELEPHONE ENCOUNTER
Received fax request from pharmacy requesting refill(s) for hydrOXYzine john (VISTARIL) 25 MG capsule    Refills available at the pharmacy. I spoke with them. They will notify pt when it is ready to be picked up.

## 2024-09-16 NOTE — TELEPHONE ENCOUNTER
Nathalie JACOBO Pain Nurse6 minutes ago (8:01 AM)       Refills have been requested for the following medications:         hydrOXYzine john (VISTARIL) 25 MG capsule [Desi Albright]     Preferred pharmacy: [facility] [address] [facility]

## 2024-09-16 NOTE — TELEPHONE ENCOUNTER
Nathalie JACOBO Pain Nurse12 minutes ago (8:00 AM)       Refills have been requested for the following medications:         oxyCODONE (ROXICODONE) 5 MG tablet [JACOB RAMIREZ]     Preferred pharmacy: [facility] [address] [facility]

## 2024-09-16 NOTE — TELEPHONE ENCOUNTER
Pending Prescriptions:                       Disp   Refills    oxyCODONE (ROXICODONE) 5 MG tablet        90 tab*0            Sig: Take 1 tablet (5 mg) by mouth 3 times daily as           needed for breakthrough pain or moderate to           severe pain. Maximum 3 pills/day.    [facility] [place], [place] - [address] [facility]

## 2024-09-17 RX ORDER — OXYCODONE HYDROCHLORIDE 5 MG/1
5 TABLET ORAL 3 TIMES DAILY PRN
Qty: 90 TABLET | Refills: 0 | Status: SHIPPED | OUTPATIENT
Start: 2024-09-17

## 2024-10-16 ENCOUNTER — LAB (OUTPATIENT)
Dept: LAB | Facility: CLINIC | Age: 59
End: 2024-10-16
Payer: MEDICARE

## 2024-10-16 DIAGNOSIS — R39.89 SUSPECTED UTI: ICD-10-CM

## 2024-10-16 DIAGNOSIS — R30.0 DYSURIA: ICD-10-CM

## 2024-10-16 LAB
ALBUMIN UR-MCNC: NEGATIVE MG/DL
APPEARANCE UR: CLEAR
BILIRUB UR QL STRIP: NEGATIVE
COLOR UR AUTO: YELLOW
GLUCOSE UR STRIP-MCNC: NEGATIVE MG/DL
HGB UR QL STRIP: NEGATIVE
KETONES UR STRIP-MCNC: ABNORMAL MG/DL
LEUKOCYTE ESTERASE UR QL STRIP: ABNORMAL
NITRATE UR QL: POSITIVE
PH UR STRIP: 7 [PH] (ref 5–8)
SP GR UR STRIP: 1.01 (ref 1–1.03)
UROBILINOGEN UR STRIP-ACNC: 1 E.U./DL

## 2024-10-16 PROCEDURE — 87086 URINE CULTURE/COLONY COUNT: CPT

## 2024-10-16 PROCEDURE — 81003 URINALYSIS AUTO W/O SCOPE: CPT | Mod: QW

## 2024-10-16 PROCEDURE — 87186 SC STD MICRODIL/AGAR DIL: CPT

## 2024-10-17 DIAGNOSIS — N39.0 URINARY TRACT INFECTION WITHOUT HEMATURIA, SITE UNSPECIFIED: Primary | ICD-10-CM

## 2024-10-17 LAB — BACTERIA UR CULT: ABNORMAL

## 2024-10-17 RX ORDER — SULFAMETHOXAZOLE AND TRIMETHOPRIM 800; 160 MG/1; MG/1
1 TABLET ORAL 2 TIMES DAILY
Qty: 14 TABLET | Refills: 0 | Status: SHIPPED | OUTPATIENT
Start: 2024-10-17 | End: 2024-10-18

## 2024-10-17 NOTE — PROGRESS NOTES
Ordered bactrim per protocol, ua positive for nitrites and leuk    Thank you,  RENNY MolinaN RN-Triage-Urology

## 2024-10-18 DIAGNOSIS — N39.0 URINARY TRACT INFECTION WITHOUT HEMATURIA, SITE UNSPECIFIED: Primary | ICD-10-CM

## 2024-10-18 RX ORDER — CIPROFLOXACIN 500 MG/1
500 TABLET, FILM COATED ORAL 2 TIMES DAILY
Qty: 10 TABLET | Refills: 0 | Status: SHIPPED | OUTPATIENT
Start: 2024-10-18

## 2024-10-18 NOTE — PROGRESS NOTES
Signed form received and mailed back to patient in self-addressed and stamped envelope.       Astrid Couch, CMA     electronic

## 2024-10-20 ENCOUNTER — HOSPITAL ENCOUNTER (EMERGENCY)
Facility: HOSPITAL | Age: 59
Discharge: HOME | End: 2024-10-20
Attending: EMERGENCY MEDICINE | Admitting: EMERGENCY MEDICINE
Payer: MEDICARE

## 2024-10-20 ENCOUNTER — APPOINTMENT (OUTPATIENT)
Dept: RADIOLOGY | Facility: HOSPITAL | Age: 59
End: 2024-10-20
Attending: EMERGENCY MEDICINE
Payer: MEDICARE

## 2024-10-20 ENCOUNTER — APPOINTMENT (OUTPATIENT)
Dept: CT IMAGING | Facility: HOSPITAL | Age: 59
End: 2024-10-20
Attending: EMERGENCY MEDICINE
Payer: MEDICARE

## 2024-10-20 VITALS
HEART RATE: 81 BPM | DIASTOLIC BLOOD PRESSURE: 55 MMHG | TEMPERATURE: 99.2 F | BODY MASS INDEX: 24.63 KG/M2 | WEIGHT: 162 LBS | SYSTOLIC BLOOD PRESSURE: 97 MMHG | RESPIRATION RATE: 22 BRPM | OXYGEN SATURATION: 95 %

## 2024-10-20 DIAGNOSIS — R53.1 WEAKNESS: ICD-10-CM

## 2024-10-20 DIAGNOSIS — R10.9 FLANK PAIN: ICD-10-CM

## 2024-10-20 LAB
ALBUMIN SERPL BCG-MCNC: 3.7 G/DL (ref 3.5–5.2)
ALBUMIN UR-MCNC: 10 MG/DL
ALP SERPL-CCNC: 79 U/L (ref 40–150)
ALT SERPL W P-5'-P-CCNC: 18 U/L (ref 0–50)
ANION GAP SERPL CALCULATED.3IONS-SCNC: 10 MMOL/L (ref 7–15)
APPEARANCE UR: CLEAR
AST SERPL W P-5'-P-CCNC: 31 U/L (ref 0–45)
BASOPHILS # BLD AUTO: 0 10E3/UL (ref 0–0.2)
BASOPHILS NFR BLD AUTO: 0 %
BILIRUB DIRECT SERPL-MCNC: <0.2 MG/DL (ref 0–0.3)
BILIRUB SERPL-MCNC: 0.3 MG/DL
BILIRUB UR QL STRIP: NEGATIVE
BUN SERPL-MCNC: 15.3 MG/DL (ref 6–20)
CALCIUM SERPL-MCNC: 8.2 MG/DL (ref 8.8–10.4)
CHLORIDE SERPL-SCNC: 98 MMOL/L (ref 98–107)
COLOR UR AUTO: ABNORMAL
CREAT SERPL-MCNC: 0.97 MG/DL (ref 0.51–0.95)
D DIMER PPP FEU-MCNC: 0.48 UG/ML FEU (ref 0–0.5)
EGFRCR SERPLBLD CKD-EPI 2021: 67 ML/MIN/1.73M2
EOSINOPHIL # BLD AUTO: 0.1 10E3/UL (ref 0–0.7)
EOSINOPHIL NFR BLD AUTO: 1 %
ERYTHROCYTE [DISTWIDTH] IN BLOOD BY AUTOMATED COUNT: 13.6 % (ref 10–15)
GLUCOSE SERPL-MCNC: 142 MG/DL (ref 70–99)
GLUCOSE UR STRIP-MCNC: NEGATIVE MG/DL
HCO3 SERPL-SCNC: 24 MMOL/L (ref 22–29)
HCT VFR BLD AUTO: 29.1 % (ref 35–47)
HGB BLD-MCNC: 9.9 G/DL (ref 11.7–15.7)
HGB UR QL STRIP: ABNORMAL
IMM GRANULOCYTES # BLD: 0 10E3/UL
IMM GRANULOCYTES NFR BLD: 0 %
KETONES UR STRIP-MCNC: NEGATIVE MG/DL
LACTATE SERPL-SCNC: 0.7 MMOL/L (ref 0.7–2)
LEUKOCYTE ESTERASE UR QL STRIP: ABNORMAL
LYMPHOCYTES # BLD AUTO: 0.5 10E3/UL (ref 0.8–5.3)
LYMPHOCYTES NFR BLD AUTO: 8 %
MCH RBC QN AUTO: 31.4 PG (ref 26.5–33)
MCHC RBC AUTO-ENTMCNC: 34 G/DL (ref 31.5–36.5)
MCV RBC AUTO: 92 FL (ref 78–100)
MONOCYTES # BLD AUTO: 0.5 10E3/UL (ref 0–1.3)
MONOCYTES NFR BLD AUTO: 8 %
NEUTROPHILS # BLD AUTO: 5.2 10E3/UL (ref 1.6–8.3)
NEUTROPHILS NFR BLD AUTO: 82 %
NITRATE UR QL: NEGATIVE
NRBC # BLD AUTO: 0 10E3/UL
NRBC BLD AUTO-RTO: 0 /100
PH UR STRIP: 6 [PH] (ref 5–7)
PLATELET # BLD AUTO: 204 10E3/UL (ref 150–450)
POTASSIUM SERPL-SCNC: 3.8 MMOL/L (ref 3.4–5.3)
PROT SERPL-MCNC: 5.9 G/DL (ref 6.4–8.3)
RBC # BLD AUTO: 3.15 10E6/UL (ref 3.8–5.2)
RBC URINE: 3 /HPF
SODIUM SERPL-SCNC: 132 MMOL/L (ref 135–145)
SP GR UR STRIP: 1.02 (ref 1–1.03)
SQUAMOUS EPITHELIAL: 2 /HPF
TROPONIN T SERPL HS-MCNC: <6 NG/L
UROBILINOGEN UR STRIP-MCNC: <2 MG/DL
WBC # BLD AUTO: 6.4 10E3/UL (ref 4–11)
WBC URINE: 8 /HPF

## 2024-10-20 PROCEDURE — 82310 ASSAY OF CALCIUM: CPT | Performed by: EMERGENCY MEDICINE

## 2024-10-20 PROCEDURE — 85379 FIBRIN DEGRADATION QUANT: CPT | Performed by: EMERGENCY MEDICINE

## 2024-10-20 PROCEDURE — 83605 ASSAY OF LACTIC ACID: CPT | Performed by: EMERGENCY MEDICINE

## 2024-10-20 PROCEDURE — 82248 BILIRUBIN DIRECT: CPT | Performed by: EMERGENCY MEDICINE

## 2024-10-20 PROCEDURE — 36415 COLL VENOUS BLD VENIPUNCTURE: CPT | Performed by: EMERGENCY MEDICINE

## 2024-10-20 PROCEDURE — 93005 ELECTROCARDIOGRAM TRACING: CPT | Performed by: EMERGENCY MEDICINE

## 2024-10-20 PROCEDURE — 250N000011 HC RX IP 250 OP 636: Performed by: EMERGENCY MEDICINE

## 2024-10-20 PROCEDURE — 84484 ASSAY OF TROPONIN QUANT: CPT | Performed by: EMERGENCY MEDICINE

## 2024-10-20 PROCEDURE — 96376 TX/PRO/DX INJ SAME DRUG ADON: CPT

## 2024-10-20 PROCEDURE — 96374 THER/PROPH/DIAG INJ IV PUSH: CPT

## 2024-10-20 PROCEDURE — 85004 AUTOMATED DIFF WBC COUNT: CPT | Performed by: EMERGENCY MEDICINE

## 2024-10-20 PROCEDURE — 81001 URINALYSIS AUTO W/SCOPE: CPT | Performed by: EMERGENCY MEDICINE

## 2024-10-20 PROCEDURE — 74177 CT ABD & PELVIS W/CONTRAST: CPT | Mod: MA

## 2024-10-20 PROCEDURE — 99285 EMERGENCY DEPT VISIT HI MDM: CPT | Mod: 25

## 2024-10-20 PROCEDURE — 71045 X-RAY EXAM CHEST 1 VIEW: CPT

## 2024-10-20 RX ORDER — MORPHINE SULFATE 4 MG/ML
4 INJECTION, SOLUTION INTRAMUSCULAR; INTRAVENOUS ONCE
Status: COMPLETED | OUTPATIENT
Start: 2024-10-20 | End: 2024-10-20

## 2024-10-20 RX ORDER — IOPAMIDOL 755 MG/ML
80 INJECTION, SOLUTION INTRAVASCULAR ONCE
Status: COMPLETED | OUTPATIENT
Start: 2024-10-20 | End: 2024-10-20

## 2024-10-20 RX ADMIN — IOPAMIDOL 80 ML: 755 INJECTION, SOLUTION INTRAVENOUS at 19:21

## 2024-10-20 RX ADMIN — MORPHINE SULFATE 4 MG: 4 INJECTION, SOLUTION INTRAMUSCULAR; INTRAVENOUS at 15:21

## 2024-10-20 RX ADMIN — MORPHINE SULFATE 4 MG: 4 INJECTION, SOLUTION INTRAMUSCULAR; INTRAVENOUS at 18:59

## 2024-10-20 ASSESSMENT — COLUMBIA-SUICIDE SEVERITY RATING SCALE - C-SSRS
1. IN THE PAST MONTH, HAVE YOU WISHED YOU WERE DEAD OR WISHED YOU COULD GO TO SLEEP AND NOT WAKE UP?: NO
2. HAVE YOU ACTUALLY HAD ANY THOUGHTS OF KILLING YOURSELF IN THE PAST MONTH?: NO
6. HAVE YOU EVER DONE ANYTHING, STARTED TO DO ANYTHING, OR PREPARED TO DO ANYTHING TO END YOUR LIFE?: NO

## 2024-10-20 ASSESSMENT — ACTIVITIES OF DAILY LIVING (ADL)
ADLS_ACUITY_SCORE: 37

## 2024-10-20 NOTE — ED TRIAGE NOTES
Patient arrives by private car for evaluation of bilateral flank pain and generalized weakness.  Patient states that she was diagnosed with UTI on Tuesday.  Started on antibiotics and reports she is feeling worse.  Reports some cramping with urination and urinary hesitancy.

## 2024-10-20 NOTE — ED PROVIDER NOTES
EMERGENCY DEPARTMENT NOTE     Name: Nathalie Bashir    Age/Sex: 59 year old female   MRN: [id number]   Evaluation Date & Time:  10/20/2024  2:30 PM    PCP:    Alban Yuan   ED Provider: Jimmy Vega D.O.       CHIEF COMPLAINT    Generalized Weakness and Flank Pain     HISTORY OF PRESENT ILLNESS   Nathalie Bashir is a 59 year old year old female with a relevant past history of gastric adenocarcinoma s/p resection, chronic pain syndrome, recurrent urinary tract infection, who presents to the ED  for evaluation of bilateral flank pain with concern for pyelonephritis.  Patient was recently prescribed ofloxacin for symptoms of urinary tract infection described as dysuria with frequency.  Culture reviewed and should be susceptible.  She has developed bilateral flank pain described as aching near her kidney area or back nonradiating into the lower abdomen and was concern for possible worsening of urinary tract infection kidney infection.  Patient has had temperature of 99 which she feels is a fever nausea but no vomiting.  Patient also feels generally weak patient is on chronic pain medications reports normal stooling without constipation, no diarrhea.  Patient has not noted hematuria.  On other review of systems is already has had cough with associated chest pain with coughing nonpleuritic.  Cough described as nonproductive without hemoptysis.  Does feel short of breath at time.  She has had nausea with brother with episodes of reflux but no true vomiting.  No diarrhea has not felt constipated.  Denying melena or rectal bleeding.  All other review of systems are negative      DIAGNOSIS & DISPOSITION/MEDICAL DECISION MAKING     1. Weakness    2. Flank pain        EMERGENCY DEPARTMENT COURSE   3:07 PM I met with the patient to gather history and to perform my initial exam.  We discussed treatment options and the plan for care while in the Emergency Department.  Triage vital signs: /63 temp 99.2 pulse 108  respiratory 20 SpO2 room air 97%  Differential diagnosis considered included but not limited to: Urinary tract infection including pyelonephritis, obstructing urolithiasis other and intra-abdominal process including appendicitis.  No cardiopulmonary symptoms considered pneumonia including aspiration pneumonia, ACS, CHF, pulmonary embolism, pneumothorax    MDM:  Chest x-ray without acute process.    CT of the abdomen and pelvis without evidence of obstructing urolithiasis, pyelonephritis, bowel obstruction perforation or acute process.  EKG sinus rhythm without ischemic changes, troponin less than 6  D-dimer nonelevated and with low suspicion for pulmonary embolus felt to exclude.  Creatinine 0.97 with GFR 67, hemoglobin 9.9 normochromic normocytic, urinalysis without significant pyuria, no bacteria nitrate negative.  WBC 6.4, lactic acid 0.7  Patient received IV ketorolac and morphine with resolution of pain.  Serial abdominal exams remain nontender.  Patient overall feeling improved.  Current workup negative for acute process and patient was reassured.  Patient will be discharged, continue antibiotics as previously prescribed.  Follow-up with primary care physician for further evaluation of anemia.  Return criteria discussed and the patient has progressive symptoms including temperature greater than 101, recurrent progressive shortness of breath, abdominal pain not improved with chronic pain medication will return to the emergency department.     Discharge Vital Signs:BP 97/55   Pulse 81   Temp 99.2  F (37.3  C) (Tympanic)   Resp 22   Wt 73.5 kg (162 lb)   LMP 09/23/2014   SpO2 95%   BMI 24.63 kg/m     PROCEDURES:   None  Diagnostic studies:  CT Abdomen Pelvis w Contrast   Final Result   IMPRESSION:    1.  No acute abnormality in the abdomen or pelvis      XR Chest Port 1 View   Final Result   IMPRESSION: Heart size is normal. Increased interstitial prominence in the lung bases is favored chronic,  potentially scarring or fibrosis.. No overt failure, lobar consolidation or large effusions. Mediastinum and bony structures are unremarkable.        Labs Ordered and Resulted from Time of ED Arrival to Time of ED Departure   BASIC METABOLIC PANEL - Abnormal       Result Value    Sodium 132 (*)     Potassium 3.8      Chloride 98      Carbon Dioxide (CO2) 24      Anion Gap 10      Urea Nitrogen 15.3      Creatinine 0.97 (*)     GFR Estimate 67      Calcium 8.2 (*)     Glucose 142 (*)    HEPATIC FUNCTION PANEL - Abnormal    Protein Total 5.9 (*)     Albumin 3.7      Bilirubin Total 0.3      Alkaline Phosphatase 79      AST 31      ALT 18      Bilirubin Direct <0.20     ROUTINE UA WITH MICROSCOPIC REFLEX TO CULTURE - Abnormal    Color Urine Light Yellow      Appearance Urine Clear      Glucose Urine Negative      Bilirubin Urine Negative      Ketones Urine Negative      Specific Gravity Urine 1.019      Blood Urine 0.03 mg/dL (*)     pH Urine 6.0      Protein Albumin Urine 10 (*)     Urobilinogen Urine <2.0      Nitrite Urine Negative      Leukocyte Esterase Urine 25 Maria C/uL (*)     RBC Urine 3 (*)     WBC Urine 8 (*)     Squamous Epithelials Urine 2 (*)    CBC WITH PLATELETS AND DIFFERENTIAL - Abnormal    WBC Count 6.4      RBC Count 3.15 (*)     Hemoglobin 9.9 (*)     Hematocrit 29.1 (*)     MCV 92      MCH 31.4      MCHC 34.0      RDW 13.6      Platelet Count 204      % Neutrophils 82      % Lymphocytes 8      % Monocytes 8      % Eosinophils 1      % Basophils 0      % Immature Granulocytes 0      NRBCs per 100 WBC 0      Absolute Neutrophils 5.2      Absolute Lymphocytes 0.5 (*)     Absolute Monocytes 0.5      Absolute Eosinophils 0.1      Absolute Basophils 0.0      Absolute Immature Granulocytes 0.0      Absolute NRBCs 0.0     D DIMER QUANTITATIVE - Normal    D-Dimer Quantitative 0.48     TROPONIN T, HIGH SENSITIVITY - Normal    Troponin T, High Sensitivity <6     LACTIC ACID WHOLE BLOOD - Normal    Lactic Acid  0.7       ED INTERVENTIONS     Medications   morphine (PF) injection 4 mg (4 mg Intravenous $Given 10/20/24 1521)   morphine (PF) injection 4 mg (4 mg Intravenous $Given 10/20/24 1859)   iopamidol (ISOVUE-370) solution 80 mL (80 mLs Intravenous $Given 10/20/24 1921)     TOTAL CRITICAL CARE TIME (EXCLUDING PROCEDURES): Not applicable      DISCHARGE MEDICATIONS        Review of your medicines        UNREVIEWED medicines. Ask your doctor about these medicines        Dose / Directions   amitriptyline 75 MG tablet  Commonly known as: ELAVIL  Used for: Neuropathic pain      Dose: 75 mg  Take 1 tablet (75 mg) by mouth at bedtime  Quantity: 30 tablet  Refills: 3     ciprofloxacin 500 MG tablet  Commonly known as: CIPRO  Used for: Urinary tract infection without hematuria, site unspecified      Dose: 500 mg  Take 1 tablet (500 mg) by mouth 2 times daily.  Quantity: 10 tablet  Refills: 0     citalopram 20 MG tablet  Commonly known as: celeXA  Used for: MDD (major depressive disorder), recurrent, in partial remission (H)      Dose: 30 mg  Take 1.5 tablets (30 mg) by mouth daily  Quantity: 135 tablet  Refills: 1     COMPOUNDED NON-CONTROLLED SUBSTANCE - PHARMACY TO MIX COMPOUNDED MEDICATION  Commonly known as: CMPD RX  Used for: Recurrent UTI, Atrophic vaginitis      Estriol 1 mg/gram,place 1 gram vaginally three times per week,42.5 grams  Quantity: 1 each  Refills: 3     Cyanocobalamin 1000 MCG Caps      Dose: 1 tablet  Take 1 tablet by mouth every morning  Refills: 0     cyclobenzaprine 10 MG tablet  Commonly known as: FLEXERIL  Used for: Muscle spasm      Dose: 10 mg  Take 1 tablet (10 mg) by mouth 3 times daily as needed for muscle spasms  Quantity: 90 tablet  Refills: 3     famotidine 20 MG tablet  Commonly known as: PEPCID  Used for: Malignant neoplasm of pyloric antrum (H), Abdominal pain, epigastric      Dose: 20 mg  TAKE 1 TABLET (20 MG) BY MOUTH 2 TIMES DAILY AS NEEDED  Quantity: 180 tablet  Refills: 1     fluticasone  50 MCG/ACT nasal spray  Commonly known as: FLONASE      Dose: 1 spray  Spray 1 spray into both nostrils daily at 2 pm  Refills: 0     hydrOXYzine john 25 MG capsule  Commonly known as: VISTARIL  Used for: Anxiety      Dose: 25-50 mg  Take 1-2 capsules (25-50 mg) by mouth 4 times daily as needed for itching or anxiety  Quantity: 240 capsule  Refills: 2     loperamide 2 MG capsule  Commonly known as: IMODIUM      Dose: 4 mg  Take 4 mg by mouth 4 times daily as needed for diarrhea  Refills: 0     loratadine-pseudoePHEDrine  MG 24 hr tablet  Commonly known as: CLARITIN-D 24-HOUR      Dose: 1 tablet  Take 1 tablet by mouth daily as needed for allergies Usually takes in Spring/Fall  Refills: 0     molnupiravir 200 MG capsule  Commonly known as: LAGEVRIO  Used for: Continuous opioid dependence (H), Gastric adenocarcinoma (H), H/O Whipple procedure, Infection due to 2019 novel coronavirus      Dose: 800 mg  Take 4 capsules (800 mg) by mouth every 12 hours  Quantity: 40 each  Refills: 0     ondansetron 8 MG tablet  Commonly known as: ZOFRAN  Used for: Nausea      Dose: 8 mg  Take 1 tablet (8 mg) by mouth every 8 hours as needed (Nausea/Vomiting)  Quantity: 90 tablet  Refills: 2     oxyCODONE 5 MG tablet  Commonly known as: ROXICODONE  Used for: Neuropathic pain, Chronic pain due to neoplasm      Dose: 5 mg  Take 1 tablet (5 mg) by mouth 3 times daily as needed for breakthrough pain or moderate to severe pain. Maximum 3 pills/day.  Quantity: 90 tablet  Refills: 0     pantoprazole 40 MG EC tablet  Commonly known as: PROTONIX  Used for: IPMN (intraductal papillary mucinous neoplasm), Malignant neoplasm of pyloric antrum (H), Abdominal pain, epigastric      Dose: 40 mg  TAKE 1 TABLET BY MOUTH 2 TIMES DAILY  Quantity: 180 tablet  Refills: 1     sennosides 8.6 MG tablet  Commonly known as: SENOKOT  Used for: Therapeutic opioid induced constipation      1-2 tablets 1-2 times daily as needed.  Quantity: 120 tablet  Refills: 1      simethicone 80 MG Tabs  Used for: Other acute gastritis without hemorrhage, Bloating      Dose: 1 tablet  Take 1 tablet by mouth every 6 hours as needed (bloating, gas, belching)  Quantity: 90 tablet  Refills: 3     SUMAtriptan 100 MG tablet  Commonly known as: IMITREX      Dose: 100 mg  Take 100 mg by mouth at onset of headache for migraine  Refills: 0            DISPOSITION: Home    Medical Decision Making    At the time of my evaluation, I do not feel the patient s symptoms are caused by sepsis      I obtained additional history from these independent historians:    I reviewed these outside records:  Telephone encounter October 15, 2024 for evaluation of possible urinary tract infection with order for urine culture and initiation of antibiotics  Returned phone call and spoke with patient. Patient calls with complaint of Dysuria, urinary frequency, and urinary urgency. Patient was recommended to leave a sample at a [place] lab and given central scheduling line to find a lab near patient. Orders placed for urine analysis and urine culture. Patient was informed that results of urine culture take 2-3 days to come back and we will call is results are positive only. Patient is aware that if symptoms persist after three days to call our office and if symptoms worsen was informed to go to ER. Also, informed if fever over 101.1, chills or flank pain they should go to ER. In the meantime, patient was instructed that they could try AZO maximum strength version over the counter as we wait for culture.       Neeta Ace LPN  Urine Culture results from 10/16/2024  >100,000 CFU/mL Escherichia coli Abnormal            Resulting Agency: [facility]     Susceptibility     Escherichia coli     FLAQUITO     Ampicillin Resistant     Ampicillin/ Sulbactam Resistant     Cefazolin Resistant 1     Cefepime Susceptible     Cefoxitin Resistant     Ceftazidime Resistant     Ceftriaxone Resistant     Ciprofloxacin Susceptible     Gentamicin  Susceptible     Levofloxacin Susceptible     Nitrofurantoin Susceptible     Piperacillin/Tazobactam Intermediate     Tobramycin Susceptible     Trimethoprim/Sulfamethoxazole Resistant            Primary care office visit September 16, 2024 where she was seen for preoperative evaluation  I noted these abnormal vital signs / labs:  Initial tachycardia with heart rate 108 which resolved with treatment of pain  Discharge blood pressure 97/55 which patient states is normal value for her  Hemoglobin 9.9 normochromic normocytic without history of melena to suggest GI bleeding  Monitor Strip Interpretation:  Sinus rhythm  12-Lead ECG Interpretation:  Sinus rhythm  I independently reviewed the following diagnostic studies:  Chest x-ray was obtained and reviewed and interpreted by myself.  No infiltrate, pulmonary vascular congestion, pneumothorax or acute process  CT abdomen and pelvis  I spoke to the following clinicians regard the patients care:  NA  My disposition decision is based on the following reasons:  Discharge: I considered admission but discharged the patient after current workup and patient's clinical course in the emergency department.  Recommended patient complete previously prescribed antibiotic course continue previously prescribed pain medication      At the conclusion of the encounter I discussed the results of all of the tests and the disposition. The questions were answered. The patient or family acknowledged understanding and was agreeable with the care plan.            INFORMATION SOURCE AND LIMITATIONS    History/Exam limitations: None  Patient information was obtained from: [facility]  Use of : N/A        REVIEW OF SYSTEMS:   All other systems reviewed and are negative except as noted above in HPI.    PATIENT HISTORY     Past Medical History:   Diagnosis Date    Allergic rhinitis     Cancer (H)     Chronic pain     Closed fracture of distal end of left radius     Depression     Gastric  adenocarcinoma (H)     Lumbago     Migraine     Opioid dependence (H)     Other chronic pain     Sacroiliitis (H)     Trochanteric bursitis      Patient Active Problem List   Diagnosis    Lower limb length difference    IPMN (intraductal papillary mucinous neoplasm)    Allergic rhinitis    Arthralgia of hip    Bursitis    Chronic neck pain    Chronic pain disorder    Continuous opioid dependence (H)    Controlled substance agreement signed    Debility    Degeneration of intervertebral disc of lumbosacral region    Depression    Disorder of sacrum    Greater trochanteric bursitis of left hip    Infected prosthesis of left hip (H)    Chronic bilateral low back pain without sciatica    MDD (major depressive disorder), recurrent, in partial remission (H)    Migraine with aura    Sacroiliitis (H)    Screening for malignant neoplasm of cervix    Spondylosis of lumbosacral spine without myelopathy    Status post left hip replacement    Cervical radiculopathy, chronic    Unilateral inguinal hernia without obstruction or gangrene    Long term (current) use of opiate analgesic    Low urine output    Hypophosphatemia    Leukocytosis    Intra-abdominal fluid collection    Bacteremia due to Gram-negative bacteria    Malnutrition (H)    Gastric adenocarcinoma (H)    Pneumonia    Mucositis    Chemotherapy-induced neutropenia (H)    Gastric cancer (H)    Cholangitis (H)    Thrombocytopenia (H)    Urinary tract infection    Crohn's disease of small intestine (H)    Jejunitis    Colitis    Abdominal pain    Malignant neoplasm of overlapping sites of stomach (H)    Iron deficiency anemia    Anemia    Paralytic ileus (H)    Hypokalemia    Nausea & vomiting    Dysphagia    History of gastric surgery    Chemotherapy-induced neuropathy (H)    Bilateral hand pain    Pyelonephritis    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)    Other closed intra-articular fracture of distal end of left radius, initial encounter     Lumbar facet arthropathy    Lumbar spondylosis    H/O Whipple procedure     Past Surgical History:   Procedure Laterality Date    ARTHROPLASTY HIP Left 2016    BACK SURGERY      L4-5 decompression    CATARACT IOL, RT/LT      CHOLECYSTECTOMY N/A 1/28/2020    Procedure: Cholecystectomy;  Surgeon: Yandel Mallory MD;  Location: [facility] OR    ENDOSCOPIC RETROGRADE CHOLANGIOPANCREATOGRAM N/A 7/27/2020    Procedure: ENDOSCOPIC RETROGRADE CHOLANGIOPANCREATOGRAPHY  **Latex Allergy** with jejunal biopsies;  Surgeon: Jeet Aviles MD;  [facility]    ESOPHAGOSCOPY, GASTROSCOPY, DUODENOSCOPY (EGD), COMBINED N/A 3/3/2020    Procedure: ESOPHAGOGASTRODUODENOSCOPY, WITH ENDOSCOPIC US (enoxaparin);  Surgeon: Bakari Plata MD;  Location:  GI    ESOPHAGOSCOPY, GASTROSCOPY, DUODENOSCOPY (EGD), COMBINED N/A 12/7/2020    Procedure: Upper Endoscopy with stent placement;  Surgeon: Jeet Aviles MD;  Location:  OR    ESOPHAGOSCOPY, GASTROSCOPY, DUODENOSCOPY (EGD), COMBINED N/A 2/8/2021    Procedure: ESOPHAGOGASTRODUODENOSCOPY (EGD)AND STENT REMOVAL;  Surgeon: Jeet Aviles MD;  Location:  OR    EYE SURGERY      GASTRECTOMY N/A 10/21/2020    Procedure: Open subtotal gastectomy with David en Y Reconstruction; D1 Lymph Node Disection  **Latex Allergy**;  Surgeon: Yandel Mallory MD;  Location: [facility] OR    INJECT BLOCK MEDIAL BRANCH CERVICAL/THORACIC/LUMBAR Bilateral 9/27/2023    Procedure: BILATERAL L4-5, L5-S1 medial branch block #1;  Surgeon: Maria Victoria Urbina MD;  Location: [facility] OR    IR CHEST PORT PLACEMENT > 5 YRS OF AGE  3/26/2020    IR LUMBAR EPIDURAL STEROID INJECTION  8/25/2009    IR LUMBAR EPIDURAL STEROID INJECTION  9/2/2009    IR LUMBAR EPIDURAL STEROID INJECTION  9/25/2009    IR LUMBAR EPIDURAL STEROID INJECTION  11/23/2009    IR LUMBAR EPIDURAL STEROID INJECTION  4/1/2010    IR LUMBAR EPIDURAL STEROID INJECTION  9/1/2010    IR LUMBAR EPIDURAL STEROID INJECTION  3/10/2011    IR LUMBAR EPIDURAL  STEROID INJECTION  8/30/2011    IR PICC PLACEMENT > 5 YRS OF AGE  10/12/2016    IR PORT REMOVAL RIGHT  12/8/2023    LAPAROSCOPY DIAGNOSTIC (GENERAL) N/A 10/13/2020    Procedure: Diagnostic laparoscopy with peritoneal washings  **Latex Allergy**;  Surgeon: Yandel Mallory MD;  Location: [facility] OR    OPEN REDUCTION INTERNAL FIXATION RODDING INTRAMEDULLARY FEMUR Left 12/23/2014    Procedure: OPEN REDUCTION INTERNAL FIXATION RODDING INTRAMEDULLARY FEMUR;  Surgeon: Arnol Santiago MD;  Location: [facility] OR    OPEN REDUCTION INTERNAL FIXATION WRIST Left 9/22/2022    Procedure: LEFT WRIST OPEN REDUCTION INTERNAL FIXATION, FRACTURE;  Surgeon: Mark Villalobos MD;  Location: [facility]    ORTHOPEDIC SURGERY      PICC DOUBLE LUMEN PLACEMENT Right 02/07/2020    5 fr double lumen 41 cm    WI LAMINEC/FACETECT/FORAMIN,LUMBAR 1 SEG      Description: Laminectomy Decompress, Facetectomy, Foraminotomy Lumbar Seg;  Recorded: 04/12/2012;    REMOVE HARDWARE LOWER EXTREMITY Left 4/7/2015    Procedure: REMOVE HARDWARE LOWER EXTREMITY;  Surgeon: Arnol Santiago MD;  Location: [facility] OR    REMOVE HARDWARE RODDING INTRAMEDULLARY FEMUR Left 12/17/2015    Procedure: REMOVE HARDWARE RODDING INTRAMEDULLARY FEMUR;  Surgeon: Arnol Santiago MD;  Location: [facility]    RESECT BONE LOWER EXTREMITY Left 12/23/2014    Procedure: RESECT BONE LOWER EXTREMITY;  Surgeon: Arnol Santiago MD;  Location: [facility]    SHORTENING OSTEOPLASTY FEMUR W/ IM NAILING      WHIPPLE PROCEDURE N/A 1/28/2020    Procedure: Open Whipple procedure and Cholecystectomy;  Surgeon: Yandel Mallory MD;  Location: [facility] OR    WHIPPLE PROCEDURE      ZZC FUSION OF SACROILIAC JOINT      Description: Arthrodesis Sacroiliac Joint Left;  Recorded: 10/07/2011;    ZZC REPAIR DETACH RETINA,SCLERAL BUCKLE         Allergies   Allergen Reactions    Gluten Meal Palpitations    Amoxicillin-Pot Clavulanate Rash     Patient tolerated Zosyn in 7/2020    Oxycontin [Oxycodone] Other (See Comments)     Confusion, loopy,  oxycodone is tolerated.    Pt states she tolerates regular oxycodone, cannot take 12 hour oxycontin.      Pregabalin      interfered with Cymbalta    Topiramate      Tongue numbness, taste alteration, irritable     Acetaminophen Nausea     Urine retention      Dust [place] Extract     Gabapentin Dizziness and GI Disturbance    Ketorolac Headache    Latex Rash    Methadone Fatigue    Mold     Naprosyn [Naproxen] Dizziness and GI Disturbance    Seasonal Allergies        OUTPATIENT MEDICATIONS     Discharge Medication List as of 10/20/2024  9:19 PM         Vitals:    10/20/24 1900 10/20/24 1928 10/20/24 1948 10/20/24 2120   BP:    97/55   Pulse: 85 83 84 81   Resp: 20   22   Temp:       TempSrc:       SpO2: 99% 93% 95% 95%   Weight:           Physical Exam   Constitutional: Oriented to person, place, and time. Appears well-developed and well-nourished.   Cardiovascular: Normal rate, regular rhythm and normal heart sounds.    Pulmonary/Chest: Normal effort  and breath sounds normal.   Abdominal: Soft. Bowel sounds are normal.  No anterior abdominal tenderness elicited does have tenderness and bilateral CVA      DIAGNOSTICS    LABORATORY FINDINGS (REVIEWED AND INTERPRETED):  Labs Ordered and Resulted from Time of ED Arrival to Time of ED Departure   BASIC METABOLIC PANEL - Abnormal       Result Value    Sodium 132 (*)     Potassium 3.8      Chloride 98      Carbon Dioxide (CO2) 24      Anion Gap 10      Urea Nitrogen 15.3      Creatinine 0.97 (*)     GFR Estimate 67      Calcium 8.2 (*)     Glucose 142 (*)    HEPATIC FUNCTION PANEL - Abnormal    Protein Total 5.9 (*)     Albumin 3.7      Bilirubin Total 0.3      Alkaline Phosphatase 79      AST 31      ALT 18      Bilirubin Direct <0.20     ROUTINE UA WITH MICROSCOPIC REFLEX TO CULTURE - Abnormal    Color Urine Light Yellow      Appearance Urine Clear      Glucose Urine Negative      Bilirubin Urine Negative      Ketones Urine Negative      Specific Gravity Urine 1.019       Blood Urine 0.03 mg/dL (*)     pH Urine 6.0      Protein Albumin Urine 10 (*)     Urobilinogen Urine <2.0      Nitrite Urine Negative      Leukocyte Esterase Urine 25 Maria C/uL (*)     RBC Urine 3 (*)     WBC Urine 8 (*)     Squamous Epithelials Urine 2 (*)    CBC WITH PLATELETS AND DIFFERENTIAL - Abnormal    WBC Count 6.4      RBC Count 3.15 (*)     Hemoglobin 9.9 (*)     Hematocrit 29.1 (*)     MCV 92      MCH 31.4      MCHC 34.0      RDW 13.6      Platelet Count 204      % Neutrophils 82      % Lymphocytes 8      % Monocytes 8      % Eosinophils 1      % Basophils 0      % Immature Granulocytes 0      NRBCs per 100 WBC 0      Absolute Neutrophils 5.2      Absolute Lymphocytes 0.5 (*)     Absolute Monocytes 0.5      Absolute Eosinophils 0.1      Absolute Basophils 0.0      Absolute Immature Granulocytes 0.0      Absolute NRBCs 0.0     D DIMER QUANTITATIVE - Normal    D-Dimer Quantitative 0.48     TROPONIN T, HIGH SENSITIVITY - Normal    Troponin T, High Sensitivity <6     LACTIC ACID WHOLE BLOOD - Normal    Lactic Acid 0.7           IMAGING (REVIEWED AND INTERPRETED):  CT Abdomen Pelvis w Contrast   Final Result   IMPRESSION:    1.  No acute abnormality in the abdomen or pelvis      XR Chest Port 1 View   Final Result   IMPRESSION: Heart size is normal. Increased interstitial prominence in the lung bases is favored chronic, potentially scarring or fibrosis.. No overt failure, lobar consolidation or large effusions. Mediastinum and bony structures are unremarkable.            ECG (REVIEWED AND INTERPRETED):   ECG:   Performed at: 15:26  HR:  91 bpm  Rhythm: Sinus  Axis: 58  QRS duration: 78 ms  QTC: 487 ms  ST changes: No ST segment elevation or depression, no T wave inversion,No Q wave  Interpretation: Sinus rhythm  Compared to most recent ECG from: September 18, 2022 no acute change    I have reviewed the patient's ECG, with comments made as listed above. Please see scanned image for full interpretation.              Jimmy Vega D.O.  EMERGENCY MEDICINE   10/20/24  [facility] EMERGENCY DEPARTMENT  [facility] [facility] [address] [phone]  Dept: [phone]     Jimmy Vega DO  10/21/24 0109

## 2024-10-21 NOTE — DISCHARGE INSTRUCTIONS
I spent time your laboratory evaluation and imaging studies including CT scan did not identify a specific cause of weakness abdominal discomfort. you were noted to be anemic but not severely.  Continue oxycodone previously described an antibiotic.  See your primary care physician follow-up early next week.  If you have increased weakness, develop a fever, have abdominal pain not improved with pain medication, have chest pain or shortness of breath, have black or tar-like stools to the emergency department.

## 2024-10-29 ENCOUNTER — OFFICE VISIT (OUTPATIENT)
Dept: PALLIATIVE MEDICINE | Facility: OTHER | Age: 59
End: 2024-10-29
Attending: NURSE PRACTITIONER
Payer: MEDICARE

## 2024-10-29 VITALS — WEIGHT: 155 LBS | BODY MASS INDEX: 23.57 KG/M2 | HEART RATE: 102 BPM

## 2024-10-29 DIAGNOSIS — G89.29 CHRONIC RIGHT SHOULDER PAIN: ICD-10-CM

## 2024-10-29 DIAGNOSIS — G89.29 CHRONIC NECK PAIN: ICD-10-CM

## 2024-10-29 DIAGNOSIS — Z79.891 LONG TERM (CURRENT) USE OF OPIATE ANALGESIC: ICD-10-CM

## 2024-10-29 DIAGNOSIS — M54.12 CERVICAL RADICULOPATHY, CHRONIC: ICD-10-CM

## 2024-10-29 DIAGNOSIS — M19.011 ARTHRITIS OF RIGHT SHOULDER REGION: Primary | ICD-10-CM

## 2024-10-29 DIAGNOSIS — M54.2 CHRONIC NECK PAIN: ICD-10-CM

## 2024-10-29 DIAGNOSIS — M25.511 CHRONIC RIGHT SHOULDER PAIN: ICD-10-CM

## 2024-10-29 LAB
CANNABINOIDS UR QL SCN: NORMAL
CREAT UR-MCNC: 119 MG/DL
ETHANOL UR QL SCN: NORMAL

## 2024-10-29 PROCEDURE — 80307 DRUG TEST PRSMV CHEM ANLYZR: CPT | Performed by: NURSE PRACTITIONER

## 2024-10-29 PROCEDURE — G0463 HOSPITAL OUTPT CLINIC VISIT: HCPCS | Performed by: NURSE PRACTITIONER

## 2024-10-29 PROCEDURE — 80360 METHYLPHENIDATE: CPT | Performed by: NURSE PRACTITIONER

## 2024-10-29 PROCEDURE — 80348 DRUG SCREENING BUPRENORPHINE: CPT | Performed by: NURSE PRACTITIONER

## 2024-10-29 PROCEDURE — 99214 OFFICE O/P EST MOD 30 MIN: CPT | Performed by: NURSE PRACTITIONER

## 2024-10-29 RX ORDER — HYDROCODONE BITARTRATE AND ACETAMINOPHEN 5; 325 MG/1; MG/1
1 TABLET ORAL 3 TIMES DAILY PRN
Qty: 90 TABLET | Refills: 0 | Status: SHIPPED | OUTPATIENT
Start: 2024-10-29 | End: 2024-11-28

## 2024-10-29 ASSESSMENT — PAIN SCALES - GENERAL: PAINLEVEL_OUTOF10: EXTREME PAIN (9)

## 2024-10-29 NOTE — LETTER

## 2024-10-29 NOTE — PATIENT INSTRUCTIONS
Plan on 10/29/2024:  Opioid rotation to Norco 5/325mg TID.    Schedule R) shoulder joint injection with Dr. Gallegos and 30 days later schedule repeat JONATHON with Dr. Gallegos.    Follow up in clinic in 2 months.        Desi Amaya [facility] Number:  [phone]  Call with any questions about your care and for scheduling assistance.   Calls are returned Monday through Friday between 8 AM and 4:30 PM. We usually get back to you within 2 business days depending on the issue/request.    If we are prescribing your medications:  For opioid medication refills, call the clinic or send a MD Lingo message 7 days in advance.  Please include:  Name of requested medication  Name of the pharmacy.  For non-opioid medications, call your pharmacy directly to request a refill. Please allow 3-4 days to be processed.   Per [place] State Law:  All controlled substance prescriptions must be filled within 30 days of being written.    For those controlled substances allowing refills, pickup must occur within 30 days of last fill.      We believe regular attendance is key to your success in our program!    Any time you are unable to keep your appointment we ask that you call us at least 24 hours in advance to cancel.This will allow us to offer the appointment time to another patient.   Multiple missed appointments may lead to dismissal from the clinic.

## 2024-10-29 NOTE — PROGRESS NOTES
Patient presents to the clinic today for a visit  with NINA Kirkpatrick CNP            6/24/2024    11:21 AM 7/24/2024    10:49 AM 10/29/2024    10:52 AM   PEG Score   PEG Total Score 6.67 5.33 5.33       UDS/CSA-10/29/24     Medications-oxycodone     QUESTIONS:    Jada PEREZ [facility] [place]

## 2024-10-29 NOTE — PROGRESS NOTES
Date 10/29/2024:      COMPREHENSIVE PAIN CLINIC FOLLOW UP EVALUATION    Ms. Nathalie Bashir on 11/3/2023 in [facility] per request of Dr. Dobbs with regards to her pain.  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  UDS and opioid agreement singed and UDS appropriate on 11/03/2023.  Nathalie Bashir has been seen at a pain clinic in the past in [place] 10 years ago, [facility] in 1990s and [facility].  UDS and opioid agreement signed on 11/03/2023.    Updates since last appointment on 07/24/2024.  R) ankle scope last month through [facility].  She has a followup  appt in 4-6 wks.  No additional pain medication was prescribed.    Worst pain is R) shoulder and neck.  Will repeat R) shoulder injection with Dr. Gallegos first then 30 days later can schedule a repeat JONATHON.  She is using cold packs and voltaren on R) shoulder.    She is requesting rotation back to hydrocodone-acet 5/325mg TID - more effective per patient.        Interventions/Injections:   07/10/2024 Dr. El MCKINNONI C7-T1  1/18/24 R) shoulder injection with Dr Gallegos: approx 65% improvement, also going to PT which is helpful.      Progress Notes Reviewed:  10/20/2024 ED - weakness and flank pain, dx with UTI and received script for Cipro.      Any hospitalizations/ER/UC visits since last appointment:  yes 10/20/2024  Any falls/accidents since last appointment:  no      Primary Pain :  neck pain L=R on left radiating down b/l arms.  She is not sleeping well.  JONATHON decreased her pain by at least 50%.  She did PT in 04/2024 but she continues to do HEP.  Ice is helpful.    Characteristics:  Any changes in pain characteristics since last appointment?  no    The patient describes the pain as constant, stabbing,  burning and numbness.     What makes the pain better:  Her pain is improved with laying down, rest.    What makes the pain worse:   She reports that the pain is made worse by moving her head, using her arms, ADL's.    10/29/2024 current pain on 0/10 VAS:   8   Worst pain:   9   Best pain:  6     07/24/2024 current pain on 0/10 VAS:   1-2    Worst pain:   10 prior to JONATHON  06/07/2024 current pain on 0/10 VAS:   7    06/07/2024 current pain on 0/10 VAS:   7          03/15/2024 current pain on 0/10 VAS:   6        Current Pain Related Medications:  Any medications changes since last appointment: no    Amitriptyline 75mg q hs   Celexa 20mg 1.5 tabs daily 30mg daily  Cyclobenzaprine 10 mg TID PRN  Hydroxyzine 25mg 1-2 tabs QID anxiety  Zofran 8mg prn nausea with migraines  Oxycodone 5mg TID - last dose last saturday.  - losing effectiveness  Stool softeners PRN  Imitrex 100mg PRN HA - per PCP    UDS and opioid agreement signed on 11/03/2023.    Therapies discuss on initial consult:   Physical therapy, Pain Psychology, TENs unit, Grounding Mat, Frequency Specific Micro Current, Anti-inflammatory Lifestyle    Social:    She is  and  lives in a house.  She has 1 grown son and 4 grandchildren.  She is independent in ADL's.      Employment: She is on SSD.    Exercise:  Last PT was 2018.  Patient exercises with stretching at home. Starting PT on Monday.    Hobbies:  She enjoys her grandchildren.    Mental Health:  Patient reports depression is under control on citalopram 30mg.  Patient does not follow with a mental health care provi            Plan on 07/24/2024:    Refill oxycodone 5mg TID 90 tabs for 30 days.  Refill hydroxyzine 25mg 1-2 tabs QID    Follow-up in three months in clinic.        Updates since last appointment on 06/24/2024.  Presents alone using a single point cane.    6/24/2024 filled hydrocodone 5/325mg TID for 3 wks since she overused last month for dental pain. Last dose this am.    Needs refills of  oxycodone 5mg TID 90 tabs for 30 days.  Needs refills of hydroxyzine 25mg 1-2 tabs QID.    Neck pain after JONATHON decreased pain by 50%.    Mother in law is in hospice.          Interventions/Injections:   07/10/2024 Dr. El HOLT C7-T1  1/18/24 R) shoulder injection with Dr Gallegos: approx 65% improvement, also going to PT which is helpful.    Progress Notes Reviewed:  07/10/2024 Dr. El HOLT C7-T1      Any hospitalizations/ER/UC visits since last appointment:  no  Any falls/accidents since last appointment:  no      Primary Pain :  neck pain L=R on left radiating down b/l arms.  She is not sleeping well.  JONATHON decreased her pain by at least 50%.  She did PT in 04/2024 but she continues to do HEP.  Ice is helpful.      Characteristics:  Any changes in pain characteristics since last appointment?  Yes 50% less pain after JONATHON    The patient describes the pain as constant, stabbing, burning and numbness.     What makes the pain better:  Her pain is improved with laying down, rest.    What makes the pain worse:   She reports that the pain is made worse by moving her head, using her arms, ADL's.    07/24/2024 current pain on 0/10 VAS:   1-2    Worst pain:   10 prior to JONATHON  06/07/2024 current pain on 0/10 VAS:   7    06/07/2024 current pain on 0/10 VAS:   7          03/15/2024 current pain on 0/10 VAS:   6          Current Pain Related Medications:  Any medications changes since last appointment: no      Start hydrocodone 5/325mg TID for 3 wks.    Amitriptyline 75mg q hs   Celexa 20mg 1.5 tabs daily 30mg daily  Cyclobenzaprine 10 mg TID PRN  Hydroxyzine 25mg 1-2 tabs QID anxiety  Zofran 8mg prn nausea with migraines  Oxycodone 5mg TID - last dose last saturday.    Stool softeners PRN  Imitrex 100mg PRN HA - per PCP    UDS and opioid agreement signed on 11/03/2023.        Therapies discuss on initial consult:   Physical therapy, Pain Psychology, TENs unit, Grounding Mat, Frequency Specific Micro Current,  Anti-inflammatory Lifestyle    Social:    She is  and  lives in a house.  She has 1 grown son and 4 grandchildren.  She is independent in ADL's.      Employment: She is on SSD.    Exercise:  Last PT was 2018.  Patient exercises with stretching at home. Starting PT on Monday.    Hobbies:  She enjoys her grandchildren.    Mental Health:  Patient reports depression is under control on citalopram 30mg.  Patient does not follow with a mental health care provi              Plan on 06/24/2024:  Interventions:  JONATHON with Dr. Gallegos      Medications:  Start hydrocodone 5/325mg TID for 3 wks.    Amitriptyline 75mg q hs   Celexa 20mg 1.5 tabs daily 30mg daily  Cyclobenzaprine 10 mg TID PRN  Hydroxyzine 25mg 1-2 tabs QID anxiety  Zofran 8mg prn nausea with migraines  Oxycodone 5mg TID - last dose last saturday.    Stool softeners PRN  Imitrex 100mg PRN HA - per PCP    UDS and opioid agreement signed on 11/03/2023.      Follow up:   Follow-up in clinic in 2 wks after JONATHON with Dr. Gallegos.        Updates since last appointment on 06/07/2024   [place] broke and had it repaired and developed dry socket.     She accidentally over used her oxycodone and ran out last Saturday night - last filled on 06/06/2024.  Discussed opioid agreement and she cannot self escalate the opioids for acute dental pain.  She acknowledged understanding.  If there is another violation of her opioid agreement  will have to taper off C2 opioids.    neck pain L=R on left radiating down b/l arms.  She is not sleeping well.  She had JONATHON many years ago at [facility] which decreased her pain by at least 50% for 6 months.. She did PT in 04/2024 but she continues to do HEP.  Ice is helpful.    Started compounding cream with ketamine 8% and gabapentin 8% to neck, shoulder, feet and started triggering migraines so she stopped the medication.        Interventions/Injections:   1/18/24 R) shoulder injection with Dr Gallegos: approx 65% improvement, also going to PT which  is helpful.      Progress Notes Reviewed:  04/27/2024 ED - [facility]  04/19/2024 Dr. Apurva Dobbs PM&R  04/11/2024 PT - R) shoulder pain  04/01/2024 ED - [facility]  03/15/2024 Sue Herrera, Pain Clinic    Any hospitalizations/ER/UC visits since last appointment:  no  Any falls/accidents since last appointment:  no      Primary Pain :  Neck and right shoulder      Characteristics:  No changes in pain characteristics since last appointment.    What makes the pain better:  Her pain is improved with laying down, rest.    What makes the pain worse:   She reports that the pain is made worse by moving her head, using her arms, ADL's.  06/07/2024 current pain on 0/10 VAS:   7    06/07/2024 current pain on 0/10 VAS:   7          03/15/2024 current pain on 0/10 VAS:   6         Current Pain Related Medications:  Any medications changes since last appointment: no    Amitriptyline 75mg q hs   Celexa 20mg 1.5 tabs daily 30mg daily  Compounding medication - stopped due to migraines  Cyclobenzaprine 10 mg TID PRN - needs refills today  Hydroxyzine 25mg 1-2 tabs QID anxiety  Zofran 8mg prn nausea with migraines - needs refills today for migraines  Oxycodone 5mg TID - last dose this am.  Last script per  05/07/2024.  Stool softeners PRN  Imitrex 100mg PRN HA - per PCP      Interventions:  JONATHON is scheduled 07/10/2024  She would like to repeat R) shoulder injection in the future      Therapies discuss on initial consult:   Physical therapy, Pain Psychology, TENs unit, Grounding Mat, Frequency Specific Micro Current, Anti-inflammatory Lifestyle    Social:    She is  and  lives in a house.  She has 1 grown son and 4 grandchildren.  She is independent in ADL's.      Employment: She is on SSD.    Exercise:  Last PT was 2018.  Patient exercises with stretching at home. Starting PT on Monday.    Hobbies:  She enjoys her grandchildren.    Mental Health:  Patient reports depression is under control on citalopram 30mg.  Patient does  not follow with a mental health care provider.            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  Stomach cancer was treated with gastrectomy and chemotherapy.  Doctors found stomach cancer when they did Whipple Surgery 01/2020 at [facility].  Patient has numbness and tingling in legs in the stocking foot distribution and b/l hands.  Patient has lumbar pain and has had lumbar MBB x 1 with Dr. Urbina.  Patient has not had any spine surgery in the past.  The patient describes the pain as constant, stabbing, burning and numbness.  She reports that the pain is made worse by prolong standing, ADL's.  Her pain is improved with laying down, rest.  She rates her currenty pain score at 7/10, but it can be as low as 3/10 or as severe as 9/10. Patient follows with Dr. Urbina for lumbar intervention options.  Rheumatology could not find any cause for her joint pain.      Progress Notes Reviewed:  10/19/2023 Dr. Dobbs, PM&R - order quad cane and compression stockings, follow-up with Dr. Urbina for low back pain  10/17/2023 Dr. Mckoy, Rheumatology  10/01/2023 UC - UTI  09/27/2023 Dr. Urbina - lumbar MBB L4-5 and L5-S1  09/12/2023 Dr. Urbina    Patient reports depression is under control on citalopram 30mg.  Patient does not follow with a mental health care provider.  Last PT was 2018.  Patient exercises with stretching at home.    She denies any new problems with falls or balance, any new numbness or weakness of the arms or legs, any new bowel or bladder incontinence, any night sweats or unexplained fevers, or any sudden or unexpected weight loss.      Nathalie ALANIZ Maria Ebao has been seen at a pain clinic in the past in [place] 10 years ago, [facility] in 1990s and  [facility].      Current  Treatments:  Amitriptyline 75mg q hs - she ran out of this medication for sleep.  Not helpful for neuropathic pain.  Celexa 20mg 1.5 tabs daily 30mg daily  Compounding medication  Cyclobenzaprine 10 mg TID PRN  Hydroxyzine 25mg 1-2 tabs QID anxiety  Zofran 8mg prn nausea with migraines  Oxycodone 5mg TID - since 2020 with palliative care  Stool softeners PRN  Imitrex 100mg PRN HA    NOTE:  allergies to naprosyn, methadone, latex, ketorlac, gabapentin, acetaminophen, topiramate, pregabalin and oxycontin    Previous Medication Treatments Included:  Anti-convulsants: Gabapentin, pregabalin had negative SE  Muscle relaxors: Tizanidine not helpful  Anti-depressants: amitriptyline was not helpful for pain, duloxetine had negative SE with mood  Benzodiazapine's: none  Acetaminophen/NSAIDs: negative SE  Topicals: Voltaren gel 1%  Headache abortives: imitrex  Headache prophylactics:   Opioids: MS ER 30mg BID, Oxycodone 10mg BID at previous pain - [facility] in 1990, buprenorphine caused negative SE      Other Treatments Have Included:  Physical therapy: yes over 2 years ago  Pain Psychology: no  Chiropractic: yes - not helpful  Acupuncture: yes - not helpful  TENs Unit: yes she has one  Injections: 09/27/2023 lumbar MBB #1  Surgeries: L) hip replacement 2015, L) SI fusion 2008  Dry Needling: no  Massage:no    Past Medical History:  Medical history reviewed.  Past Medical History:   Diagnosis Date    Allergic rhinitis     Cancer (H)     stomach cancer    Chronic pain     Closed fracture of distal end of left radius     Depression     Gastric adenocarcinoma (H)     Lumbago     Migraine     Opioid dependence (H)     Other chronic pain     low back    Sacroiliitis (H)     low back pain    Trochanteric bursitis     leg length discrrepancy, hip pain      Patient Active Problem List   Diagnosis    Lower limb length difference    IPMN (intraductal papillary mucinous neoplasm)    Allergic rhinitis    Arthralgia of hip    Bursitis     Chronic neck pain    Chronic pain disorder    Continuous opioid dependence (H)    Controlled substance agreement signed    Debility    Degeneration of intervertebral disc of lumbosacral region    Depression    Disorder of sacrum    Greater trochanteric bursitis of left hip    Infected prosthesis of left hip (H)    Chronic bilateral low back pain without sciatica    MDD (major depressive disorder), recurrent, in partial remission (H)    Migraine with aura    Sacroiliitis (H)    Screening for malignant neoplasm of cervix    Spondylosis of lumbosacral spine without myelopathy    Status post left hip replacement    Cervical radiculopathy, chronic    Unilateral inguinal hernia without obstruction or gangrene    Long term (current) use of opiate analgesic    Low urine output    Hypophosphatemia    Leukocytosis    Intra-abdominal fluid collection    Bacteremia due to Gram-negative bacteria    Malnutrition (H)    Gastric adenocarcinoma (H)    Pneumonia    Mucositis    Chemotherapy-induced neutropenia (H)    Gastric cancer (H)    Cholangitis (H)    Thrombocytopenia (H)    Urinary tract infection    Crohn's disease of small intestine (H)    Jejunitis    Colitis    Abdominal pain    Malignant neoplasm of overlapping sites of stomach (H)    Iron deficiency anemia    Anemia    Paralytic ileus (H)    Hypokalemia    Nausea & vomiting    Dysphagia    History of gastric surgery    Chemotherapy-induced neuropathy (H)    Bilateral hand pain    Pyelonephritis    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  Past Surgical History:   Procedure Laterality Date    ARTHROPLASTY HIP Left 2016    BACK SURGERY      L4-5 decompression    CATARACT IOL, RT/LT      CHOLECYSTECTOMY N/A 1/28/2020     Procedure: Cholecystectomy;  Surgeon: Yandel Mallory MD;  Location: [facility] OR    ENDOSCOPIC RETROGRADE CHOLANGIOPANCREATOGRAM N/A 7/27/2020    Procedure: ENDOSCOPIC RETROGRADE CHOLANGIOPANCREATOGRAPHY  **Latex Allergy** with jejunal biopsies;  Surgeon: Jeet Aviles MD;  Location: [facility] OR    ESOPHAGOSCOPY, GASTROSCOPY, DUODENOSCOPY (EGD), COMBINED N/A 3/3/2020    Procedure: ESOPHAGOGASTRODUODENOSCOPY, WITH ENDOSCOPIC US (enoxaparin);  Surgeon: Bakari Plata MD;  Location: [facility] GI    ESOPHAGOSCOPY, GASTROSCOPY, DUODENOSCOPY (EGD), COMBINED N/A 12/7/2020    Procedure: Upper Endoscopy with stent placement;  Surgeon: Jeet Aviles MD;  Location:  OR    ESOPHAGOSCOPY, GASTROSCOPY, DUODENOSCOPY (EGD), COMBINED N/A 2/8/2021    Procedure: ESOPHAGOGASTRODUODENOSCOPY (EGD)AND STENT REMOVAL;  Surgeon: Jeet Aviles MD;  Location:  OR    EYE SURGERY      GASTRECTOMY N/A 10/21/2020    Procedure: Open subtotal gastectomy with David en Y Reconstruction; D1 Lymph Node Disection  **Latex Allergy**;  Surgeon: Yandel Mallory MD;  Location: [facility] OR    INJECT BLOCK MEDIAL BRANCH CERVICAL/THORACIC/LUMBAR Bilateral 9/27/2023    Procedure: BILATERAL L4-5, L5-S1 medial branch block #1;  Surgeon: Maria Victoria Urbina MD;  Location: [facility] OR    IR CHEST PORT PLACEMENT > 5 YRS OF AGE  3/26/2020    IR LUMBAR EPIDURAL STEROID INJECTION  8/25/2009    IR LUMBAR EPIDURAL STEROID INJECTION  9/2/2009    IR LUMBAR EPIDURAL STEROID INJECTION  9/25/2009    IR LUMBAR EPIDURAL STEROID INJECTION  11/23/2009    IR LUMBAR EPIDURAL STEROID INJECTION  4/1/2010    IR LUMBAR EPIDURAL STEROID INJECTION  9/1/2010    IR LUMBAR EPIDURAL STEROID INJECTION  3/10/2011    IR LUMBAR EPIDURAL STEROID INJECTION  8/30/2011    IR PICC PLACEMENT > 5 YRS OF AGE  10/12/2016    IR PORT REMOVAL RIGHT  12/8/2023    LAPAROSCOPY DIAGNOSTIC (GENERAL) N/A 10/13/2020    Procedure: Diagnostic laparoscopy with peritoneal washings  **Latex Allergy**;  Surgeon:  Yandel Mallory MD;  Location: [facility] OR    OPEN REDUCTION INTERNAL FIXATION RODDING INTRAMEDULLARY FEMUR Left 12/23/2014    Procedure: OPEN REDUCTION INTERNAL FIXATION RODDING INTRAMEDULLARY FEMUR;  Surgeon: Arnol Santiago MD;  [facility]: [facility] OR    OPEN REDUCTION INTERNAL FIXATION WRIST Left 9/22/2022    Procedure: LEFT WRIST OPEN REDUCTION INTERNAL FIXATION, FRACTURE;  Surgeon: Mark Villalobos MD;  Location: [facility] OR    ORTHOPEDIC SURGERY      PICC DOUBLE LUMEN PLACEMENT Right 02/07/2020    5 fr double lumen 41 cm    NC LAMINEC/FACETECT/FORAMIN,LUMBAR 1 SEG      Description: Laminectomy Decompress, Facetectomy, Foraminotomy Lumbar Seg;  Recorded: 04/12/2012;    REMOVE HARDWARE LOWER EXTREMITY Left 4/7/2015    Procedure: REMOVE HARDWARE LOWER EXTREMITY;  Surgeon: Arnol Santiago MD;  [facility]    REMOVE HARDWARE RODDING INTRAMEDULLARY FEMUR Left 12/17/2015    Procedure: REMOVE HARDWARE RODDING INTRAMEDULLARY FEMUR;  Surgeon: Arnol Santiago MD;  [facility]    RESECT BONE LOWER EXTREMITY Left 12/23/2014    Procedure: RESECT BONE LOWER EXTREMITY;  Surgeon: Arnol Santiago MD;  [facility]    SHORTENING OSTEOPLASTY FEMUR W/ IM NAILING      WHIPPLE PROCEDURE N/A 1/28/2020    Procedure: Open Whipple procedure and Cholecystectomy;  Surgeon: Yandel Mallory MD;  Location: [facility] OR    WHIPPLE PROCEDURE      ZZC FUSION OF SACROILIAC JOINT      Description: Arthrodesis Sacroiliac Joint Left;  Recorded: 10/07/2011;    ZZC REPAIR DETACH RETINA,SCLERAL BUCKLE          Medications: Pertinent medications reviewed.  Current Outpatient Medications   Medication Sig Dispense Refill    amitriptyline (ELAVIL) 75 MG tablet Take 1 tablet (75 mg) by mouth at bedtime 30 tablet 3    ciprofloxacin (CIPRO) 500 MG tablet Take 1 tablet (500 mg) by mouth 2 times daily. 10 tablet 0    citalopram (CELEXA) 20 MG tablet Take 1.5 tablets (30 mg) by mouth daily 135 tablet 1    COMPOUNDED NON-CONTROLLED SUBSTANCE (CMPD RX) - PHARMACY TO MIX  COMPOUNDED MEDICATION Estriol 1 mg/gram,place 1 gram vaginally three times per week,42.5 grams 1 each 3    Cyanocobalamin 1000 MCG CAPS Take 1 tablet by mouth every morning      cyclobenzaprine (FLEXERIL) 10 MG tablet Take 1 tablet (10 mg) by mouth 3 times daily as needed for muscle spasms 90 tablet 3    famotidine (PEPCID) 20 MG tablet TAKE 1 TABLET (20 MG) BY MOUTH 2 TIMES DAILY AS NEEDED 180 tablet 1    fluticasone (FLONASE) 50 MCG/ACT nasal spray Spray 1 spray into both nostrils daily at 2 pm      loperamide (IMODIUM) 2 MG capsule Take 4 mg by mouth 4 times daily as needed for diarrhea      loratadine-pseudoePHEDrine (CLARITIN-D 24-HOUR)  MG 24 hr tablet Take 1 tablet by mouth daily as needed for allergies Usually takes in Spring/Fall      molnupiravir (LAGEVRIO) 200 MG capsule Take 4 capsules (800 mg) by mouth every 12 hours 40 each 0    ondansetron (ZOFRAN) 8 MG tablet Take 1 tablet (8 mg) by mouth every 8 hours as needed (Nausea/Vomiting) 90 tablet 2    oxyCODONE (ROXICODONE) 5 MG tablet Take 1 tablet (5 mg) by mouth 3 times daily as needed for breakthrough pain or moderate to severe pain. Maximum 3 pills/day. 90 tablet 0    pantoprazole (PROTONIX) 40 MG EC tablet TAKE 1 TABLET BY MOUTH 2 TIMES DAILY 180 tablet 1    sennosides (SENOKOT) 8.6 MG tablet 1-2 tablets 1-2 times daily as needed. 120 tablet 1    simethicone 80 MG TABS Take 1 tablet by mouth every 6 hours as needed (bloating, gas, belching) 90 tablet 3    SUMAtriptan (IMITREX) 100 MG tablet Take 100 mg by mouth at onset of headache for migraine         [place] Prescription Monitoring Program reviewed 10/29/2024.  No concern for abuse or misuse of controlled medications based on this report.  10/16/2024 Oxycodone 5mg 90 tabs for 30 days  09/18/2024 Oxycodone 5mg 90 tabs for 30 days  08/21/2024 Oxycodone 5mg 90 tabs for 30 days  07/24/2024 Oxycodone 5mg 90 tabs for 30 days  06/24/2024 Hydrocodone-acet 5/325mg 75 tabs for 25 ays  06/06/2024 Oxycodone  5mg 90 tabs for 30 days  05/07/2024 Oxycodone 5mg 90 tabs for 30 days  04/07/2024 Oxycodone 5mg 90 tabs for 30 days  Monthly scripts for oxycodone in 2024 no other medications listed.    Allergies: Pertinent allergies reviewed.     Allergies   Allergen Reactions    Gluten Meal Palpitations    Amoxicillin-Pot Clavulanate Rash     Patient tolerated Zosyn in 7/2020    Oxycontin [Oxycodone] Other (See Comments)     Confusion, loopy, oxycodone is tolerated.    Pt states she tolerates regular oxycodone, cannot take 12 hour oxycontin.      Pregabalin      interfered with Cymbalta    Topiramate      Tongue numbness, taste alteration, irritable     Acetaminophen Nausea     Urine retention      D[place] M[place] Extract     Gabapentin Dizziness and GI Disturbance    Ketorolac Headache    Latex Rash    Methadone Fatigue    Mold     Naprosyn [Naproxen] Dizziness and GI Disturbance    Seasonal Allergies        Family History:   family history includes Dementia in her father; Heart Failure (age of onset: 77) in her mother; No Known Problems in her brother and sister.    Social History:   She is  and  lives in a house.  She has 1 grown child.  She is on SSD.  She is independent in ADL's.  She has 4 grand children who live in [place].  She  reports that she quit smoking about 4 years ago. Her smoking use included cigarettes. She started smoking about 29 years ago. She has a 12.5 pack-year smoking history. She has been exposed to tobacco smoke. She has never used smokeless tobacco. She reports that she does not drink alcohol and does not use drugs.  Social History     Social History Narrative    Not on file       Physical Exam:  Pulse 102   Wt 70.3 kg (155 lb)   LMP 09/23/2014   BMI 23.57 kg/m        Constitutional: She is oriented to person, place, and time.  She appears well-developed and well-nourished. She is not in acute distress.   HENT:     Head: Normocephalic and atraumatic.     Eyes: Pupils are equal, round, and  reactive to light. EOM are normal. No scleral icterus.   Pulmonary/Chest:  NWOB. No respiratory distress.   Neurological: She is alert and oriented to person, place, and time. Coordination grossly normal.  Skin: Skin is warm and dry. She is not diaphoretic.   Psychiatric: She has a normal mood and affect. Her behavior is normal. Judgment and thought content normal.  Patient answers questions appropriately.  MSK: Gait is antalgic.    Cervical:  ROM is moderately painful in all planes and unrestricted.  Sensation intact to upper extremities.  Strength 5/5 in upper extremities.  Spurling's test is negative.    Narrative & Impression   EXAMINATION: CT CHEST/ABDOMEN/PELVIS W CONTRAST, 11/13/2023 12:03 PM     TECHNIQUE:  Helical CT images from the thoracic inlet through the  symphysis pubis were obtained with IV contrast. Contrast dose: Isovue  370 88cc     COMPARISON: CT abdomen and pelvis dated 8/30/2023, CT chest abdomen  pelvis dated 5/17/2023.     HISTORY: Malignant neoplasm of pyloric antrum (H)     FINDINGS:  CHEST:  LUNGS: Central tracheobronchial tree is patent. No pneumothorax or  pleural effusion. No focal airspace opacity. No suspicious pulmonary  nodule. Unchanged subcentimeter nodule along the left fissure likely  represents an intrafissural lymph node. Stable few sub-4 mm pulmonary  nodules are likely benign.      MEDIASTINUM: Right port catheter terminates in distal SVC. Heart size  is within normal limits. No pericardial effusion. Ascending aorta and  main pulmonary artery diameters are within normal limits. Normal  appearance and configuration of the great vessels off of the aortic  arch. No suspicious mediastinal, hilar, or axillary lymph nodes.      Visualized thyroid and esophagus are unremarkable.     ABDOMEN/PELVIS:  LIVER: No suspicious focal hepatic lesion.     BILIARY: Gallbladder is surgically absent. Unchanged mild pneumobilia.  No intrahepatic or extrahepatic biliary ductal dilation.      PANCREAS: Post Whipple's procedure. No focal pancreatic lesion. No  pancreatic duct dilatation.     SPLEEN: Within normal limits.     ADRENAL GLANDS: No focal adrenal nodule.     URINARY TRACT: No suspicious renal lesion. No hydronephrosis or  hydroureter. No renal stone. Urinary bladder is unremarkable.     REPRODUCTIVE ORGANS: Within normal limits.     BOWEL: Partial gastrectomy and gastrojejunostomy. Extensive colonic  diverticulosis. Normal caliber large and small bowel. No abnormal  bowel wall thickening or enhancement. Appendix is unremarkable.     PERITONEUM/FLUID: No ascites or pelvic free fluid.     VESSELS: No aneurysmal dilatation of the abdominal aorta.  The portal,  splenic, and superior mesenteric veins are patent.  The origins of the  celiac and superior mesenteric arteries are patent.     LYMPH NODES: No lymphadenopathy.     BONES/SOFT TISSUES: No aggressive osseous lesions. Degenerative  changes in the spine. L1 hemangioma. Few chronic right rib fractures.  Stable small circumscribed lucent focus in the sternum is likely  benign. Left hip arthroplasty. Left SI joint fixation hardware.  Changes of right inguinal hernia repair.                                                                       IMPRESSION:   No evidence of metastatic disease in the chest, abdomen, or pelvis.     CHERYL SOLIZ MD            EXAM: MR SHOULDER RIGHT WITHOUT CONTRAST  LOCATION: [facility]  DATE: 11/18/2023     INDICATION: Shoulder pain.  COMPARISON: None.  TECHNIQUE: Unenhanced.     FINDINGS:     ROTATOR CUFF:  -Supraspinatus: Mild tendinosis without tearing. Normal muscle without strain or atrophy.  -Infraspinatus: No tendon tear, tendinopathy or fatty atrophy.  -Subscapularis: Mild tendinosis without tear or tendon thinning. Normal muscle without edema or atrophy.  -Teres minor: Normal tendon and muscle.     CORACOACROMIAL ARCH:  -Morphology: Developmental variant os acromiale. Small  amount of fluid at the pseudoarticulation. No significant inflammatory changes or stress reaction. Type I acromial morphology. No subacromial spur. Subacromial and subcoracoid space are normal.   -Bursa: Mild subacromial/subdeltoid bursitis.     ACROMIOCLAVICULAR JOINT:   -Normal alignment. Minimal degenerative arthrosis.      LONG HEAD OF BICEPS TENDON:   -Small tenosynovial effusion. Normal tendon without tear or tendinosis.     GLENOHUMERAL JOINT:   -Labrum: Degenerative surface fraying and blunting of the labrum. No full-thickness tear/avulsion.   -Cartilage: Smooth high-grade cartilage loss, with full-thickness thinning over the glenoid, and mixed high-grade partial-thickness and full-thickness thinning over the humeral head.  -Joint space: Minimally increased joint fluid. Mild synovitis. No loose intra-articular bodies.  -Glenohumeral ligaments and capsule: No pericapsular inflammation.     BONES:   -Negative for fracture, AVN, or bone marrow replacement lesion.  -End-stage degenerative arthritic changes, with marginal osteophytes.     SOFT TISSUES:   -Normal deltoid muscle bulk. Normal visualized chest wall and axilla.                                                                      IMPRESSION:  1.  Advanced osteoarthrosis, with high-grade cartilage loss, including areas of exposed subchondral bone over the glenoid and humeral head. Large marginal osteophytes.     2.  Minimally increased glenohumeral joint fluid. Mild synovitis. No loose bodies.     3.  Intact rotator cuff without full-thickness or partial-thickness tear. Mild supraspinatus and subscapularis tendinosis.     4.  Mild subacromial/subdeltoid bursitis.     5.  Small biceps tenosynovial effusion. No tendon tearing or significant tendinosis.     6.  Incidentally identified os acromiale developmental variant morphology.      Narrative & Impression   EXAM: MR LUMBAR SPINE W/O CONTRAST  LOCATION: [facility]  DATE:  9/3/2023     INDICATION: History of lumbar lami (+10yrs), primarily axial LBP>BLE radicular pain.  COMPARISON: Plain film imaging 08/29/2023 of the lumbar spine, prior body CT 08/30/2023 also reviewed.  TECHNIQUE: Routine Lumbar Spine MRI without IV contrast.     FINDINGS:   Nomenclature is based on 5 lumbar type vertebral bodies. Rudimentary 12th ribs are identified. Partial sacralization left L5. Correlate with plain film imaging if there is future plan for intervention. Satisfactory lumbar vertebral body height. 2 mm   degenerative anterior subluxation L3-L4 as on prior body CT. No high-grade subluxations. No marrow or ligamentous edema. Nothing for sacral insufficiency or stress fracture. Nothing for a marrow-infiltrative process. Benign vertebral body hemangioma at   L1. Interspace narrowing lower thoracic level and at L3-L4. Congenital narrowing of the L5-S1 interspace. Left SI joint space fusion hardware with degenerative changes right SI joint. Sacral neural foramina are intact. Normal distal spinal cord and cauda   equina with conus medullaris at L1. Rightward lumbar curve. Nerve roots of the cauda equina are satisfactory without nodularity thickening. No retroperitoneal solid mass or adenopathy. Symmetric psoas appearance. Normal caliber abdominal aorta.   Satisfactory renal contours.     T12-L1: Moderate loss of disc height with annular bulge. No disc herniation, spinal stenosis or foraminal narrowing is present. Facet joints are satisfactory.      L1-L2: Normal disc height and signal. Mild annular bulge. No herniation. Normal facets. No spinal canal or neural foraminal stenosis.     L2-L3: Mild loss of disc height with annular bulge. No disc herniation. No spinal stenosis. Mild foraminal narrowing bilaterally. Mild facet joint hypertrophic changes are present.      L3-L4: Mild loss of disc height. Low-grade degenerative anterior subluxation. Generalized disc bulge. No disc herniation. Mild foraminal  narrowing inferiorly. No spinal stenosis. Mild facet joint arthropathy bilaterally.     L4-L5: Normal disc height and signal. Annular bulge. No herniation. Normal facets. No spinal canal or right neural foraminal stenosis. Mild left foraminal narrowing.     L5-S1: Partial sacralization on the left. Interspace narrowing on a congenital/developmental basis with disc desiccation. No disc herniation. No spinal stenosis or foraminal narrowing.                                                                      IMPRESSION:  1.  See above for counting nomenclature, stable from prior body CT examination 08/30/2023 with partial sacralization left L5. Correlate with plain film imaging if there is future plan for intervention.     2.  Low-grade degenerative anterior subluxation L3 upon L4. No high-grade subluxations. No marrow or ligamentous edema.     3.  No evidence for severe spinal stenosis or severe foraminal compromise at any lumbar level.     4.  See above for additional details and full level by level description.         EXAM: XR SACRUM/COCCYX   LOCATION: [facility]   DATE/TIME: 6/14/2021 10:39 AM     INDICATION: Tailbone pain for a year, history of adenocarcinoma of the stomach   COMPARISON: None.     IMPRESSION: Postsurgical change with cage at the left SI joint. Left hip arthroplasty. No fracture or malalignment. No osseous lesion. L5-S1 degenerative disc changes with facet arthrosis. Right hip is intact.     EXAM: XR SHOULDER RIGHT G/E 3 VIEWS  LOCATION: [facility]  DATE/TIME: 9/18/2022 12:10 PM     INDICATION: Pain following fall.  COMPARISON: None.                                                                      IMPRESSION: No fractures are identified. Normal glenohumeral alignment. Mild degenerative changes in the glenohumeral joint. The acromioclavicular joint is unremarkable. Right-sided port catheter.      Narrative & Impression   CT CERVICAL SPINE  W/O CONTRAST 9/18/2022 12:14 PM     Provided History: neck pain post fall     Comparison: CT chest and pelvis 11/15/2021     Technique: Using multidetector thin collimation helical acquisition  technique, axial, coronal and sagittal CT images through the cervical  spine were obtained without intravenous contrast.      Findings:     The cervical vertebrae are normally aligned. Straightening of the  cervical curvature.      No acute fracture or traumatic subluxation. No prevertebral edema.      There is multilevel disc height narrowing throughout. Multilevel  cervical spondylosis with disc osteophyte complexes, uncinate  spurring, and facet arthropathy. Mild spinal canal narrowing at C4-5  and C5-6. Mild to moderate bilateral neural foraminal narrowing at  C4-5, mild to moderate right and mild left neural stenosis at C5-6.     No abnormality of the paraspinous soft tissues.                                                                      Impression:   1. No acute fracture or traumatic subluxation.  2. Multilevel cervical spondylosis, with minimal spinal canal  narrowing at C4-C5 and C5-C6.     I have personally reviewed the examination and initial interpretation  and I agree with the findings.       EMG:  na      Diagnosis:  (M54.12) Cervical radiculopathy, chronic  (primary encounter diagnosis)  Comment:   Plan: HYDROcodone-acetaminophen (NORCO) 5-325 MG         tablet            (M47.12) Cervical spondylosis with myelopathy  Comment:   Plan:     (F11.90) Chronic, continuous use of opioids  Comment:   Plan:     (G89.29) Chronic intractable pain  Comment:   Plan:           Plan on 10/29/2024:  Opioid rotation to Norco 5/325mg TID per patient request.    Schedule R) shoulder joint injection with Dr. Gallegos and 30 days later schedule repeat JONATHON with Dr. Gallegos.    Follow up in clinic in 2 months.        Desi Amaya CNP          BILLING TIME DOCUMENTATION:   The total TIME spent on this patient on the date of the  encounter/appointment was 30 minutes.

## 2024-10-31 LAB
OXYMORPHONE UR CFM-MCNC: 198 NG/ML
OXYMORPHONE/CREAT UR: 166 NG/MG {CREAT}

## 2024-11-01 ENCOUNTER — TELEPHONE (OUTPATIENT)
Dept: PALLIATIVE MEDICINE | Facility: OTHER | Age: 59
End: 2024-11-01
Payer: MEDICARE

## 2024-11-01 NOTE — TELEPHONE ENCOUNTER
"Screening Questions for Radiology Injections:    Injection to be done at which interventional clinic site? [facility]    If choosing [facility] for location, please inform patient:  \"[facility] is a Hospital based clinic. Before your visit, you should check with your insurance about how it covers the charges for facility services in a hospital-based clinic.     Procedure ordered by Desi Albright APRN CNP in [facility]    Procedure ordered? R) shoulder joint injection with Dr. Gallegos   Transforaminal Cervical MORGAN - [place] [facility] - No [facility] Site providers perform this procedure    What insurance would patient like us to bill for this procedure? Medicare/BC  IF SCHEDULING IN [facility] PLEASE SCHEDULE AT LEAST 7-10 BUSINESS DAYS OUT SO A PA CAN BE OBTAINED  Worker's comp or MVA (motor vehicle accident) -Any injection DO NOT SCHEDULE and route to Gee Stoner.    [employer] insurance - For ALL INJECTIONS DO NOT SCHEDULE and route to Luz Abbott.     ALL BCBS, [employer] and [employer] C[employer] - DO NOT SCHEDULE and route to Luz Queeny  MEDICA- ALL INJECTIONS- route to Luz Abbott    Is patient scheduled at [facility]? NO    If YES, route every encounter to [facility] SPINE CENTER CARE NAVIGATION POOL [[phone]]    Is an  needed? No     Patient has a  home? (Review Grid) Not Applicable    Any chance of pregnancy? NO   If YES, do NOT schedule and route to RN pool  - Dr. Urbina route to PM&R Nurse  [[id number]]      Is patient actively being treated for cancer or immunocompromised? No  If YES, do NOT schedule and route to RN pool/ Dr. Urbnia's Team    Does the patient have a bleeding or clotting disorder? No   If YES, okay to schedule AND route to RN nurse / Dr. Urbina's Team   (For any patients with platelet count <100, RN must forward to provider)    Is patient taking any Blood Thinners OR Antiplatelet medication?  No   If hold needed, do NOT " schedule, route to RN pool/ Dr. Urbina's Team  Examples:   Blood Thinners: (Coumadin, Warfarin, Jantoven, Pradaxa, Xarelto, Eliquis, Edoxaban, Enoxaparin, Lovenox, Heparin, Arixtra, Fondaparinux or Fragmin)  Antiplatelet Medications: (Plavix, Brilinta or Effient)     Is patient taking any aspirin products (includes Excedrin and Fiorinal)? No   If yes route to RN rebekah/ Dr. Urbina's Team - Do not schedule    Is patient taking any GLP-1 Antagonist (hold needed for sedation patients only) No   (semaglutide (Ozempic, Wegovy), dulaglutide (Trulicity), exenatide ER (Bydureon), tirzepatide (Mounjaro), Liraglutide (Saxenda, Victoza), semaglutide (Rybelsus), Terzepatide (Zepbound)  If YES, okay to schedule AND route to RN nurse / Dr. Urbina's Team      Any allergies to contrast dye, iodine, shellfish, or numbing and steroid medications? No  If YES, schedule and add allergy information to appointment notes AND route to the RN rebekah/ Dr. Urbina's Team  If MORGAN and Contrast Dye / Iodine Allergy? DO NOT SCHEDULE, route to RN pool/ Dr. Urbina's Team  Allergies: Gluten meal, Amoxicillin-pot clavulanate, Oxycontin [oxycodone], Pregabalin, Topiramate, Acetaminophen, Dust mite extract, Gabapentin, Ketorolac, Latex, Methadone, Mold, Naprosyn [naproxen], and Seasonal allergies     Does patient have an active infection or treated for one within the past week? No  Is patient currently taking any antibiotics or steroid medications?  No   For patients on chronic, preventative, or prophylactic antibiotics, procedures may be scheduled.   For patients on antibiotics for active or recent infection, schedule 4 days after completed.  For patients on steroid medications, schedule 4 days after completed.     Has the patient had a flu shot or any other vaccinations within the past 7 days? No  If yes, explain that for the vaccine to work best they need to:     wait 1 week before and 1 week after getting any Vaccine  wait 1 week before and 2 weeks after  getting any Covid Vaccine   If patient has concerns about the timing, send to RN pool/ Dr. Urbina's Team    Does patient have an MRI/CT?  Not Applicable Include Date and Check Procedure Scheduling Grid to see if required.  Was the MRI/CT done within the last 3 years?  NA   If no route to RN Pool/ Dr. Jos Team  If yes, where was the MRI/CT done?    Refer to PACS Transmissions list for approved external locations and route to RN Pool High Priority/ Dr. Magdaleno Team  If MRI was not done at approved external location do NOT schedule and route to RN pool/ Dr. Jos Team    If patient has an imaging disc, the injection MAY be scheduled but patient must bring disc to appt or appt will be cancelled.    Is patient able to transfer to a procedure table with minimal or no assistance? Yes   If no, do NOT schedule and route to RN Pool/ Dr. Urbina's Team    Procedure Specific Instructions:  If celiac plexus block, informed patient NPO for 6 hours and that it is okay to take medications with sips of water, especially blood pressure medications Not Applicable       If this is for a cervical procedure, informed patient that aspirin needs to be held for 6 days.   Not Applicable    Sedation, If Sedation is ordered for any procedure, patient must be NPO for 6 hours prior to procedure Not Applicable    If IV needed:  Do not schedule procedures requiring IV placement in the first appointment of the day or first appointment after lunch. Do NOT schedule at 0745, 0815 or 1245.     Instructed patient to arrive 30 minutes early for IV start if required. (Check Procedure Scheduling Grid)  Not Applicable    Reminders:  If you are started on any steroids or antibiotics between now and your appointment, you must contact us because the procedure may need to be cancelled.  Yes    As a reminder, receiving steroids can decrease your body's ability to fight infection.   Would you still like to move forward with scheduling the injection?   Yes    IV Sedation is not provided for procedures. If oral anti-anxiety medication is needed, the patient should request this from their referring provider.    Instruct patient to arrive as directed prior to the scheduled appointment time:  If IV needed 30 minutes before appointment time     For patients 85 or older we recommend having an adult stay w/ them for the remainder of the day.     If the patient is Diabetic, remind them to bring their glucometer.      Does the patient have any questions?  NO  Martha Guzman  [facility]

## 2024-11-02 LAB
ATRIAL RATE - MUSE: 91 BPM
DIASTOLIC BLOOD PRESSURE - MUSE: 57 MMHG
INTERPRETATION ECG - MUSE: NORMAL
P AXIS - MUSE: 64 DEGREES
PR INTERVAL - MUSE: 148 MS
QRS DURATION - MUSE: 78 MS
QT - MUSE: 396 MS
QTC - MUSE: 487 MS
R AXIS - MUSE: 58 DEGREES
SYSTOLIC BLOOD PRESSURE - MUSE: 118 MMHG
T AXIS - MUSE: 41 DEGREES
VENTRICULAR RATE- MUSE: 91 BPM

## 2024-11-09 ENCOUNTER — HEALTH MAINTENANCE LETTER (OUTPATIENT)
Age: 59
End: 2024-11-09

## 2024-11-18 ENCOUNTER — MYC MEDICAL ADVICE (OUTPATIENT)
Dept: PALLIATIVE MEDICINE | Facility: OTHER | Age: 59
End: 2024-11-18
Payer: MEDICARE

## 2024-11-18 DIAGNOSIS — G89.29 CHRONIC RIGHT SHOULDER PAIN: ICD-10-CM

## 2024-11-18 DIAGNOSIS — M19.011 ARTHRITIS OF RIGHT SHOULDER REGION: ICD-10-CM

## 2024-11-18 DIAGNOSIS — M54.2 CHRONIC NECK PAIN: ICD-10-CM

## 2024-11-18 DIAGNOSIS — G89.29 CHRONIC NECK PAIN: ICD-10-CM

## 2024-11-18 DIAGNOSIS — M25.511 CHRONIC RIGHT SHOULDER PAIN: ICD-10-CM

## 2024-11-19 NOTE — TELEPHONE ENCOUNTER
"Patient is requesting an early refill of the HYDROcodone-acetaminophen (NORCO) 5-325 MG tablet. Requesting it to be filled on 11/26/24 for a trip out of town.     Last filled 10/29/24 for 90 tablets.    Prescription states two directions. \"Take 1 tablet by mouth 3 times daily as needed for severe pain (max of 2 tablets per day).\" Will send to provider to review.     Patient's last UDS/CSA 10/29/24.   Last appt: 10/29/24  Next appt: 12/24/24  "

## 2024-11-20 DIAGNOSIS — M62.838 MUSCLE SPASM: ICD-10-CM

## 2024-11-20 RX ORDER — HYDROCODONE BITARTRATE AND ACETAMINOPHEN 5; 325 MG/1; MG/1
1 TABLET ORAL 3 TIMES DAILY PRN
Qty: 90 TABLET | Refills: 0 | Status: SHIPPED | OUTPATIENT
Start: 2024-11-20 | End: 2024-12-20

## 2024-11-20 NOTE — TELEPHONE ENCOUNTER
Drug: cyclobenzaprine (FLEXERIL) 10 MG tablet    Qty: 90  Last filled 10/25/24  Last seen: 10/29/24  Next appointment 12/24/24

## 2024-11-20 NOTE — TELEPHONE ENCOUNTER
Name from pharmacy: CYCLOBENZAPRINE 10 MG TABLET          Will file in chart as: cyclobenzaprine (FLEXERIL) 10 MG tablet    Sig: TAKE 1 TABLET BY MOUTH 3 TIMES DAILY AS NEEDED FOR MUSCLE SPASMS    Disp: 90 tablet    Refills: 3    Start: 11/20/2024    Class: E-Prescribe    For: Muscle spasm    Last ordered: 5 months ago (6/7/2024) by NINA Kirkpatrick CNP    Last refill: 10/25/2024    Rx #: [id number]       To be filled at: [facility] [address] [place] [facility] - [place], [place] - [address]

## 2024-11-21 RX ORDER — CYCLOBENZAPRINE HCL 10 MG
10 TABLET ORAL 3 TIMES DAILY PRN
Qty: 90 TABLET | Refills: 3 | Status: SHIPPED | OUTPATIENT
Start: 2024-11-21

## 2024-12-02 ENCOUNTER — ANCILLARY PROCEDURE (OUTPATIENT)
Dept: CT IMAGING | Facility: CLINIC | Age: 59
End: 2024-12-02
Attending: INTERNAL MEDICINE
Payer: MEDICARE

## 2024-12-02 ENCOUNTER — LAB (OUTPATIENT)
Dept: LAB | Facility: CLINIC | Age: 59
End: 2024-12-02
Payer: MEDICARE

## 2024-12-02 DIAGNOSIS — C16.3 MALIGNANT NEOPLASM OF PYLORIC ANTRUM (H): ICD-10-CM

## 2024-12-02 LAB
ALBUMIN SERPL BCG-MCNC: 3.8 G/DL (ref 3.5–5.2)
ALP SERPL-CCNC: 87 U/L (ref 40–150)
ALT SERPL W P-5'-P-CCNC: 20 U/L (ref 0–50)
ANION GAP SERPL CALCULATED.3IONS-SCNC: 7 MMOL/L (ref 7–15)
AST SERPL W P-5'-P-CCNC: 28 U/L (ref 0–45)
BASOPHILS # BLD AUTO: 0 10E3/UL (ref 0–0.2)
BASOPHILS NFR BLD AUTO: 1 %
BILIRUB SERPL-MCNC: 0.2 MG/DL
BUN SERPL-MCNC: 8.1 MG/DL (ref 6–20)
CALCIUM SERPL-MCNC: 8.5 MG/DL (ref 8.8–10.4)
CHLORIDE SERPL-SCNC: 103 MMOL/L (ref 98–107)
CREAT SERPL-MCNC: 0.85 MG/DL (ref 0.51–0.95)
EGFRCR SERPLBLD CKD-EPI 2021: 79 ML/MIN/1.73M2
EOSINOPHIL # BLD AUTO: 0.1 10E3/UL (ref 0–0.7)
EOSINOPHIL NFR BLD AUTO: 1 %
ERYTHROCYTE [DISTWIDTH] IN BLOOD BY AUTOMATED COUNT: 13.8 % (ref 10–15)
GLUCOSE SERPL-MCNC: 119 MG/DL (ref 70–99)
HCO3 SERPL-SCNC: 27 MMOL/L (ref 22–29)
HCT VFR BLD AUTO: 35.3 % (ref 35–47)
HGB BLD-MCNC: 11.7 G/DL (ref 11.7–15.7)
IMM GRANULOCYTES # BLD: 0 10E3/UL
IMM GRANULOCYTES NFR BLD: 0 %
LYMPHOCYTES # BLD AUTO: 1.2 10E3/UL (ref 0.8–5.3)
LYMPHOCYTES NFR BLD AUTO: 25 %
MCH RBC QN AUTO: 31 PG (ref 26.5–33)
MCHC RBC AUTO-ENTMCNC: 33.1 G/DL (ref 31.5–36.5)
MCV RBC AUTO: 94 FL (ref 78–100)
MONOCYTES # BLD AUTO: 0.4 10E3/UL (ref 0–1.3)
MONOCYTES NFR BLD AUTO: 7 %
NEUTROPHILS # BLD AUTO: 3.3 10E3/UL (ref 1.6–8.3)
NEUTROPHILS NFR BLD AUTO: 66 %
NRBC # BLD AUTO: 0 10E3/UL
NRBC BLD AUTO-RTO: 0 /100
PLATELET # BLD AUTO: 252 10E3/UL (ref 150–450)
POTASSIUM SERPL-SCNC: 4.6 MMOL/L (ref 3.4–5.3)
PROT SERPL-MCNC: 6.2 G/DL (ref 6.4–8.3)
RBC # BLD AUTO: 3.77 10E6/UL (ref 3.8–5.2)
SODIUM SERPL-SCNC: 137 MMOL/L (ref 135–145)
WBC # BLD AUTO: 5 10E3/UL (ref 4–11)

## 2024-12-02 PROCEDURE — 85025 COMPLETE CBC W/AUTO DIFF WBC: CPT | Performed by: PATHOLOGY

## 2024-12-02 PROCEDURE — 80053 COMPREHEN METABOLIC PANEL: CPT | Performed by: PATHOLOGY

## 2024-12-02 PROCEDURE — 36415 COLL VENOUS BLD VENIPUNCTURE: CPT | Performed by: PATHOLOGY

## 2024-12-02 PROCEDURE — G1010 CDSM STANSON: HCPCS | Performed by: RADIOLOGY

## 2024-12-02 PROCEDURE — 74177 CT ABD & PELVIS W/CONTRAST: CPT | Mod: MG | Performed by: RADIOLOGY

## 2024-12-02 PROCEDURE — 71260 CT THORAX DX C+: CPT | Mod: MG | Performed by: RADIOLOGY

## 2024-12-02 RX ORDER — IOPAMIDOL 755 MG/ML
83 INJECTION, SOLUTION INTRAVASCULAR ONCE
Status: COMPLETED | OUTPATIENT
Start: 2024-12-02 | End: 2024-12-02

## 2024-12-02 RX ADMIN — IOPAMIDOL 83 ML: 755 INJECTION, SOLUTION INTRAVASCULAR at 11:29

## 2024-12-02 NOTE — DISCHARGE INSTRUCTIONS

## 2024-12-09 DIAGNOSIS — R39.89 SUSPECTED UTI: Primary | ICD-10-CM

## 2024-12-10 ENCOUNTER — LAB (OUTPATIENT)
Dept: LAB | Facility: HOSPITAL | Age: 59
End: 2024-12-10
Payer: MEDICARE

## 2024-12-10 ENCOUNTER — TELEPHONE (OUTPATIENT)
Dept: PALLIATIVE MEDICINE | Facility: OTHER | Age: 59
End: 2024-12-10

## 2024-12-10 DIAGNOSIS — R39.89 SUSPECTED UTI: ICD-10-CM

## 2024-12-10 LAB
ALBUMIN UR-MCNC: 20 MG/DL
APPEARANCE UR: ABNORMAL
BACTERIA #/AREA URNS HPF: ABNORMAL /HPF
BILIRUB UR QL STRIP: NEGATIVE
COLOR UR AUTO: YELLOW
GLUCOSE UR STRIP-MCNC: NEGATIVE MG/DL
HGB UR QL STRIP: ABNORMAL
KETONES UR STRIP-MCNC: ABNORMAL MG/DL
LEUKOCYTE ESTERASE UR QL STRIP: ABNORMAL
MUCOUS THREADS #/AREA URNS LPF: PRESENT /LPF
NITRATE UR QL: POSITIVE
PH UR STRIP: 6 [PH] (ref 5–7)
RBC URINE: 8 /HPF
SP GR UR STRIP: 1.03 (ref 1–1.03)
SQUAMOUS EPITHELIAL: 2 /HPF
UROBILINOGEN UR STRIP-MCNC: <2 MG/DL
WBC CLUMPS #/AREA URNS HPF: PRESENT /HPF
WBC URINE: >182 /HPF

## 2024-12-10 PROCEDURE — 87088 URINE BACTERIA CULTURE: CPT

## 2024-12-10 PROCEDURE — 87186 SC STD MICRODIL/AGAR DIL: CPT

## 2024-12-10 PROCEDURE — 81001 URINALYSIS AUTO W/SCOPE: CPT

## 2024-12-10 NOTE — TELEPHONE ENCOUNTER
Preprocedure reminder call     Arrival time 0815 am    Location: [facility] [place] [address], [address]    Has the patient had a flu shot or any other vaccinations within the past 7 days?     If yes, explain that for the vaccine to work best they need to:              wait 1 week before and 1 week after getting any Vaccine           If patient has concerns about the timing, send to RN pool    If yes, check with the nurse or provider. The procedure will most likely need to be rescheduled.    Have you had any antibiotics in the last seven days?     If yes check with the nurse or provider. The procedure will most likely need to be rescheduled.    Its ok to eat and drink as usual and take your  BP and diabetes medications if this applies to you. ok (note patient has been informed yes or no)    To call and reschedule or talk to the nurse about any questions you may have please dial    [phone]

## 2024-12-11 ENCOUNTER — RADIOLOGY INJECTION OFFICE VISIT (OUTPATIENT)
Dept: PALLIATIVE MEDICINE | Facility: OTHER | Age: 59
End: 2024-12-11
Attending: NURSE PRACTITIONER
Payer: MEDICARE

## 2024-12-11 VITALS — SYSTOLIC BLOOD PRESSURE: 98 MMHG | HEART RATE: 94 BPM | DIASTOLIC BLOOD PRESSURE: 58 MMHG | OXYGEN SATURATION: 98 %

## 2024-12-11 DIAGNOSIS — M19.011 OSTEOARTHRITIS OF RIGHT SHOULDER, UNSPECIFIED OSTEOARTHRITIS TYPE: ICD-10-CM

## 2024-12-11 PROCEDURE — 20610 DRAIN/INJ JOINT/BURSA W/O US: CPT | Mod: RT | Performed by: STUDENT IN AN ORGANIZED HEALTH CARE EDUCATION/TRAINING PROGRAM

## 2024-12-11 PROCEDURE — 250N000011 HC RX IP 250 OP 636: Performed by: STUDENT IN AN ORGANIZED HEALTH CARE EDUCATION/TRAINING PROGRAM

## 2024-12-11 PROCEDURE — 77002 NEEDLE LOCALIZATION BY XRAY: CPT | Mod: 26 | Performed by: STUDENT IN AN ORGANIZED HEALTH CARE EDUCATION/TRAINING PROGRAM

## 2024-12-11 PROCEDURE — 255N000002 HC RX 255 OP 636: Performed by: STUDENT IN AN ORGANIZED HEALTH CARE EDUCATION/TRAINING PROGRAM

## 2024-12-11 RX ORDER — ROPIVACAINE HYDROCHLORIDE 2 MG/ML
4 INJECTION, SOLUTION EPIDURAL; INFILTRATION; PERINEURAL ONCE
Status: COMPLETED | OUTPATIENT
Start: 2024-12-11 | End: 2024-12-11

## 2024-12-11 RX ORDER — TRIAMCINOLONE ACETONIDE 40 MG/ML
40 INJECTION, SUSPENSION INTRA-ARTICULAR; INTRAMUSCULAR ONCE
Status: COMPLETED | OUTPATIENT
Start: 2024-12-11 | End: 2024-12-11

## 2024-12-11 RX ADMIN — TRIAMCINOLONE ACETONIDE 40 MG: 40 INJECTION, SUSPENSION INTRA-ARTICULAR; INTRAMUSCULAR at 09:07

## 2024-12-11 RX ADMIN — IOHEXOL 1 ML: 180 INJECTION INTRAVENOUS at 08:48

## 2024-12-11 RX ADMIN — ROPIVACAINE HYDROCHLORIDE 4 ML: 2 INJECTION EPIDURAL; INFILTRATION; PERINEURAL at 09:07

## 2024-12-11 ASSESSMENT — PAIN SCALES - GENERAL
PAINLEVEL_OUTOF10: MODERATE PAIN (5)
PAINLEVEL_OUTOF10: EXTREME PAIN (8)

## 2024-12-11 NOTE — NURSING NOTE
Discharge Information    IV Discontiued Time:  NA    Amount of Fluid Infused:  NA    Discharge Criteria = When patient returns to baseline or as per MD order    Consciousness:  Pt is fully awake    Circulation:  BP +/- 20% of pre-procedure level    Respiration:  Patient is able to breathe deeply    O2 Sat:  Patient is able to maintain O2 Sat >92% on room air    Activity:  Moves 4 extremities on command    Ambulation:  Patient is able to stand and walk or stand and pivot into wheelchair    Dressing:  Clean/dry or No Dressing    Notes:   Discharge instructions and AVS given to patient    Patient meets criteria for discharge?  YES    Admitted to PCU?  No    Responsible adult present to accompany patient home?  Yes    Signature/Title:    SILVER RANDOLPH, RN  RN Care Coordinator  [facility] [place]

## 2024-12-11 NOTE — PATIENT INSTRUCTIONS
[facility] Procedure Discharge Instructions    Today you saw:   Dr. Gallegos    Your procedure:  right shoulder injection    Medications used:  Lidocaine (anesthetic)  Ropivicaine (anesthetic)  Kenalog (steroid)  Omnipaque (contrast)              Be cautious when walking as numbness and/or weakness in the legs may occur up to 6-8 hours after the procedure due to effect of the local anesthetic  Do not drive for 6 hours. The effect of the local anesthetic could slow your reflexes.   Avoid strenuous activity for the first 24 hours. You may resume your regular activities after that.   You may shower, however avoid swimming, tub baths or hot tubs for 24 hours following your procedure  You may have a mild to moderate increase in pain for several days following the injection.    You may use ice packs for 10-15 minutes, 3 to 4 times a day at the injection site for comfort  Do not use heat to painful areas for 6 to 8 hours. This will give the local anesthetic time to wear off and prevent you from accidentally burning your skin.  Unless you have been directed to avoid the use of anti-inflammatory medications (NSAIDS-ibuprofen, Aleve, Motrin), you may use these medications or Tylenol for pain control if needed.   With diabetes, check your blood sugar more frequently than usual as your blood sugar may be higher than normal for 10-14 days following a steroid injection. Contact your doctor who manages your diabetes if your blood sugar is higher than usual  Possible side effects of steroids that you may experience include flushing, elevated blood pressure, increased appetite, mild headaches and restlessness.  All of these symptoms will get better with time.  It may take up to 14 days for the steroid medication to start working although you may feel the effect as early as a few days after the procedure.   Follow up with your referring provider in 2-3 weeks    If you experience any of the following, call the pain  center line during work hours at [phone] or on-call physician after hours at [phone]:  -Fever over 100 degree F  -Swelling, bleeding, redness, drainage, warmth at the injection site  -Progressive weakness or numbness in your legs or arms  -Loss of bowel or bladder function  -Unusual headache that is not relieved by Tylenol or your regular headache medication  -Unusual new onset of pain that is not improving

## 2024-12-11 NOTE — PROGRESS NOTES
Pre procedure Diagnosis: Right shoulder arthropathy   Post procedure Diagnosis: Same  Procedure performed: Right glenohumeral injection  Anesthesia: Local  Complications: None  Operators: Remberto Gallegos MD, Troy Gil MD    Indications:   Nathalie Bashir is a 59 year old female was sent by Desi Vargas NP for right shoulder arthropathy    Options/alternatives, benefits and risks were discussed with the patient including bleeding, infection, tissue trauma, exposure to radiation, reaction to medications including seizure, nerve injury, weakness, and numbness.  Questions were answered to her satisfaction and she agrees to proceed. Voluntary informed consent was obtained and signed.     Vitals were reviewed: Yes  Allergies were reviewed:  Yes   Medications were reviewed:  Yes   Pre-procedure pain score: 8/10    Procedure:  After obtaining informed consent, patient was brought into the procedure suite and was placed in a supine position on the procedure table.   A Pause for Cause was performed.  Patient was prepped and draped in the usual sterile fashion.    The right glenohumeral joint was identified under fluoroscopy. An AP approach was taken for the injection. A 25G 1.5inch needle was advanced under intermittent fluoroscopy until it was felt to enter the glenohumeral joint, aiming at the superomedial aspect of the humeral head.    A total of 1 ml of Omnipaque-180 was injected, showing appropriate location, with spread into the intraarticular space and no intravascular uptake noted.    A mixture containing 4ml of 0.2% ropivacaine with 40 mg of Kenalog was injected. The needle was removed.     Hemostasis was achieved, the area was cleaned, and bandaids were placed when appropriate.  The patient tolerated the procedure well, and was taken to the recovery room.    Images were saved to PACS.    Post-procedure pain score: 5/10  Follow-up includes:   -f/u with referring provider    Troy Gil MD  Pain Fellow,  [facility]    Physician Attestation   I, Remberto Gallegos MD, was present for all key and critical portions of the procedure, and I was immediately available during the remainder of the procedure.  Any changes to the documentation have been made above.    Remberto Gallegos MD  Interventional Pain Medicine  [facility]

## 2024-12-11 NOTE — NURSING NOTE
Pre-procedure Intake  If YES to any questions or NO to having a   Please complete laminated checklist and leave on the computer keyboard for Provider, verbally inform provider if able.    For SCS Trial, RFA's or any sedation procedure:  Have you been fasting? NA  If yes, for how long? NA    Are you taking any any blood thinners such as Coumadin, Warfarin, Jantoven, Pradaxa Xarelto, Eliquis, Edoxaban, Enoxaparin, Lovenox, Heparin, Arixtra, Fondaparinux, or Fragmin? OR Antiplatelet medication such as Plavix, Brilinta, or Effient?   No   If yes, when did you take your last dose? NA    Do you take aspirin?  No  If cervical procedure, have you held aspirin for 6 days?   NA    Is the Pt taking any GLP-1 Antagonist (hold needed for sedation patients only)  (semaglutide (Ozempic, Wegovy), dulaglutide (Trulicity), exenatide ER (Bydureon), tirzepatide (Mounjaro), Liraglutide (Saxenda, Victoza), semaglutide (Rybelsus)     NA  If yes, when did you take your last dose? NA    Do you have any allergies to contrast dye, iodine, steroid and/or numbing medications?  NO    Are you currently taking antibiotics or have an active infection?  NO    Have you had a fever/elevated temperature within the past week? NO    Are you currently taking oral steroids? NO    Do you have a ? NA    Are you pregnant or breastfeeding?  Not Applicable    Have you received any vaccinations in the last week? NO    Notify provider and RNs if systolic BP >170, diastolic BP >100, P >100 or O2 sats < 90%

## 2024-12-12 LAB — BACTERIA UR CULT: ABNORMAL

## 2024-12-14 LAB — BACTERIA UR CULT: ABNORMAL

## 2024-12-17 ENCOUNTER — VIRTUAL VISIT (OUTPATIENT)
Dept: ONCOLOGY | Facility: CLINIC | Age: 59
End: 2024-12-17
Attending: INTERNAL MEDICINE
Payer: MEDICARE

## 2024-12-17 VITALS — HEIGHT: 68 IN | BODY MASS INDEX: 22.88 KG/M2 | WEIGHT: 151 LBS

## 2024-12-17 DIAGNOSIS — C16.9 GASTRIC ADENOCARCINOMA (H): Primary | ICD-10-CM

## 2024-12-17 PROCEDURE — 99214 OFFICE O/P EST MOD 30 MIN: CPT | Mod: 95 | Performed by: STUDENT IN AN ORGANIZED HEALTH CARE EDUCATION/TRAINING PROGRAM

## 2024-12-17 NOTE — PROGRESS NOTES
Virtual Visit Details    Type of service:  Video Visit   Video Start Time:  2:31 PM  Video End Time: 2:39 PM    Originating Location (pt. Location): Home    Distant Location (provider location):  On-site  Platform used for Video Visit: [facility] Oncology Visit Note      Video visit for follow up of gastric cancer  By herself  Cat, 18 yo, Primitivo in her lab  Doing really well  QOL 10/10  No major complaints  No falls  Eats well   in home also    On exam:  Conversational  Well appearing    Labs/ imaging:  I personally reviewed the CT CAP on 12//2024 that demonstrated MOISES and and Hb 11.7 on 12/2/2024    Assessment/plan:  60 yo woman with gastric cancer.  Received FLOT x 8 and then surgery - [id number] disease in 10/2020.  MOISES currently.  We will get CT CAP in 1 year to complete 5 years surveillance.      I spent a total of 32 minutes on the date of service including preparation time (e.g., review of records and interpretation of tests), visit time with the patient and care partners, requesting interventions, communicating with other health care professionals, and documentation.

## 2024-12-17 NOTE — NURSING NOTE
Current patient location: [address] [place] [id number]    Is the patient currently in the state of [place]? YES    Visit mode:VIDEO    If the visit is dropped, the patient can be reconnected by:TELEPHONE VISIT: Phone number: [phone]    Will anyone else be joining the visit? Yes, possibly spouse will join .  (If patient encounters technical issues they should call [phone]    Are changes needed to the allergy or medication list? No    Are refills needed on medications prescribed by this physician? NO    Rooming Documentation:  Questionnaire(s) completed    Reason for visit: RECHECK    Nancy CHIN

## 2024-12-17 NOTE — LETTER
12/17/2024      Nathalie Bashir  [address] [address]      Dear Colleague,    Thank you for referring your patient, Nathalie Bashir, to the [facility]. Please see a copy of my visit note below.    Virtual Visit Details    Type of service:  Video Visit   Video Start Time:  2:31 PM  Video End Time: 2:39 PM    Originating Location (pt. Location): Home    Distant Location (provider location):  On-site  Platform used for Video Visit: [facility]    Medical Oncology Visit Note      Video visit for follow up of gastric cancer  By herself  Cat, 20 yo, Primitivo in her lab  Doing really well  QOL 10/10  No major complaints  No falls  Eats well   in home also    On exam:  Conversational  Well appearing    Labs/ imaging:  I personally reviewed the CT CAP on 12//2024 that demonstrated MOISES and and Hb 11.7 on 12/2/2024    Assessment/plan:  60 yo woman with gastric cancer.  Received FLOT x 8 and then surgery - [id number] disease in 10/2020.  MOISES currently.  We will get CT CAP in 1 year to complete 5 years surveillance.      I spent a total of 32 minutes on the date of service including preparation time (e.g., review of records and interpretation of tests), visit time with the patient and care partners, requesting interventions, communicating with other health care professionals, and documentation.          Again, thank you for allowing me to participate in the care of your patient.        Sincerely,        Silver Austin MD

## 2024-12-19 ASSESSMENT — PAIN SCALES - PAIN ENJOYMENT GENERAL ACTIVITY SCALE (PEG)
PEG_TOTALSCORE: 6.33
INTERFERED_ENJOYMENT_LIFE: 5
INTERFERED_GENERAL_ACTIVITY: 8
AVG_PAIN_PASTWEEK: 6

## 2024-12-24 ENCOUNTER — OFFICE VISIT (OUTPATIENT)
Dept: PALLIATIVE MEDICINE | Facility: OTHER | Age: 59
End: 2024-12-24
Attending: NURSE PRACTITIONER
Payer: MEDICARE

## 2024-12-24 VITALS
HEART RATE: 97 BPM | WEIGHT: 156 LBS | OXYGEN SATURATION: 100 % | BODY MASS INDEX: 23.72 KG/M2 | SYSTOLIC BLOOD PRESSURE: 124 MMHG | DIASTOLIC BLOOD PRESSURE: 69 MMHG

## 2024-12-24 DIAGNOSIS — M25.511 CHRONIC RIGHT SHOULDER PAIN: Primary | ICD-10-CM

## 2024-12-24 DIAGNOSIS — G89.29 CHRONIC RIGHT SHOULDER PAIN: Primary | ICD-10-CM

## 2024-12-24 DIAGNOSIS — F11.90 CHRONIC, CONTINUOUS USE OF OPIOIDS: ICD-10-CM

## 2024-12-24 DIAGNOSIS — G89.29 CHRONIC INTRACTABLE PAIN: ICD-10-CM

## 2024-12-24 DIAGNOSIS — M54.12 CERVICAL RADICULOPATHY: ICD-10-CM

## 2024-12-24 PROCEDURE — 99214 OFFICE O/P EST MOD 30 MIN: CPT | Performed by: NURSE PRACTITIONER

## 2024-12-24 PROCEDURE — G2211 COMPLEX E/M VISIT ADD ON: HCPCS | Performed by: NURSE PRACTITIONER

## 2024-12-24 PROCEDURE — G0463 HOSPITAL OUTPT CLINIC VISIT: HCPCS | Performed by: NURSE PRACTITIONER

## 2024-12-24 RX ORDER — OXYCODONE HYDROCHLORIDE 5 MG/1
5 TABLET ORAL 3 TIMES DAILY PRN
Qty: 90 TABLET | Refills: 0 | Status: SHIPPED | OUTPATIENT
Start: 2024-12-24 | End: 2025-01-23

## 2024-12-24 ASSESSMENT — PAIN SCALES - GENERAL: PAINLEVEL_OUTOF10: EXTREME PAIN (8)

## 2024-12-24 NOTE — PROGRESS NOTES
December 24, 2024    COMPREHENSIVE PAIN CLINIC FOLLOW UP EVALUATION    Ms. Nathalie Bashir on 11/3/2023 in the [facility] per request of Dr. Dobbs with regards to her pain.  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  UDS and opioid agreement singed and UDS appropriate on 11/03/2023.  Nathalie Bashir has been seen at a pain clinic in the past in [place] 10 years ago, [facility] in [facility] and [facility].  UDS and opioid agreement signed on 10/29/2024.      Updates since last appointment on 10/29/2024 .  She presents alone using a single point cane.    She has repeat R) shoulder injection in January.      Chemotherapy caused tooth decay.    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      Interventions/Injections:   12/11/2024 Dr. Gallegos R) shoulder injection.    Progress Notes Reviewed:  12/17/2024 Dr. Austin, Hematology - gastric adenocarcinoma  Received FLOT x 8 and then surgery - [id number] disease in 10/2020   12/02/2024 NINA Farah, [facility] - R) finger pain    Any hospitalizations/ER/UC visits since last appointment:  no  Any falls/accidents since last appointment:  no      Primary Pain :  neck pain L=R on left radiating down b/l arms.  She is not sleeping well.  JONATHON decreased her pain by at least 50%.  She did PT in 04/2024 but she continues to do HEP.  Ice is helpful and voltaren gel.    Characteristics:  Any changes in pain characteristics since last appointment?  no    The patient describes the pain as constant, stabbing, burning and numbness.     What makes the pain better:  Her pain is improved with laying down, rest.    What makes the pain worse:   She reports that the pain is made worse by moving her head, using her arms,  ADL's.    12/24/2024 current pain on 0/10 VAS:   7    Worst pain:   9   Best pain:  4  10/29/2024 current pain on 0/10 VAS:   8    Worst pain:   9   Best pain:  6     07/24/2024 current pain on 0/10 VAS:   1-2 Worst pain:  10 prior to JONATHON  06/07/2024 current pain on 0/10 VAS:   7    06/07/2024 current pain on 0/10 VAS:   7          03/15/2024 current pain on 0/10 VAS:   6        Current Pain Related Medications:  Any medications changes since last appointment: no    Amitriptyline 75mg q hs   Celexa 20mg 1.5 tabs daily 30mg daily  Cyclobenzaprine 10 mg TID PRN  Hydroxyzine 25mg 1-2 tabs QID anxiety  Zofran 8mg prn nausea with migraines  Oxycodone 5mg TID - last dose last yesterday  Stool softeners PRN  Imitrex 100mg PRN HA - per PCP      Therapies discuss on initial consult:   Physical therapy, Pain Psychology, TENs unit, Grounding Mat, Frequency Specific Micro Current, Anti-inflammatory Lifestyle    Social:    She is  and  lives in a house.  She has 1 grown son and 4 grandchildren.  She is independent in ADL's.      Employment: She is on SSD.    Exercise:  Last PT was 2018.  Patient exercises with stretching at home. Starting PT on Monday.    Hobbies:  She enjoys her grandchildren. One grandson is autistic.    Mental Health:  Patient reports depression is under control on citalopram 30mg.  Patient does not follow with a mental health care provi        Plan on 10/29/2024:  Opioid rotation to Norco 5/325mg TID per patient request.    Schedule R) shoulder joint injection with Dr. Gallegos and 30 days later schedule repeat JONATHON with Dr. Gallegos.    Follow up in clinic in 2 months.        Updates since last appointment on 07/24/2024.  R) ankle scope last month through [facility].  She has a followup  appt in 4-6 wks.  No additional pain medication was prescribed.    Worst pain is R) shoulder and neck.  Will repeat R) shoulder injection with Dr. Gallegos first then 30 days later can schedule a repeat JONATHON.  She is using cold  packs and voltaren on R) shoulder.    She is requesting rotation back to hydrocodone-acet 5/325mg TID - more effective per patient.        Interventions/Injections:   07/10/2024 Dr. El HOLT C7-T1  1/18/24 R) shoulder injection with Dr Gallegos: approx 65% improvement, also going to PT which is helpful.      Progress Notes Reviewed:  10/20/2024 ED - weakness and flank pain, dx with UTI and received script for Cipro.      Any hospitalizations/ER/UC visits since last appointment:  yes 10/20/2024  Any falls/accidents since last appointment:  no      Primary Pain :  neck pain L=R on left radiating down b/l arms.  She is not sleeping well.  JONATHON decreased her pain by at least 50%.  She did PT in 04/2024 but she continues to do HEP.  Ice is helpful.    Characteristics:  Any changes in pain characteristics since last appointment?  no    The patient describes the pain as constant, stabbing, burning and numbness.     What makes the pain better:  Her pain is improved with laying down, rest.    What makes the pain worse:   She reports that the pain is made worse by moving her head, using her arms, ADL's.    10/29/2024 current pain on 0/10 VAS:   8   Worst pain:   9   Best pain:  6     07/24/2024 current pain on 0/10 VAS:   1-2    Worst pain:   10 prior to JONATHON  06/07/2024 current pain on 0/10 VAS:   7    06/07/2024 current pain on 0/10 VAS:   7          03/15/2024 current pain on 0/10 VAS:   6        Current Pain Related Medications:  Any medications changes since last appointment: no    Amitriptyline 75mg q hs   Celexa 20mg 1.5 tabs daily 30mg daily  Cyclobenzaprine 10 mg TID PRN  Hydroxyzine 25mg 1-2 tabs QID anxiety  Zofran 8mg prn nausea with migraines  Oxycodone 5mg TID - last dose last saturday.  - losing effectiveness  Stool softeners PRN  Imitrex 100mg PRN HA - per PCP    UDS and opioid agreement signed on 11/03/2023.    Therapies discuss on initial consult:   Physical therapy, Pain Psychology, TENs unit,  Grounding Mat, Frequency Specific Micro Current, Anti-inflammatory Lifestyle    Social:    She is  and  lives in a house.  She has 1 grown son and 4 grandchildren.  She is independent in ADL's.      Employment: She is on SSD.    Exercise:  Last PT was 2018.  Patient exercises with stretching at home. Starting PT on Monday.    Hobbies:  She enjoys her grandchildren.    Mental Health:  Patient reports depression is under control on citalopram 30mg.  Patient does not follow with a mental health care provi            Plan on 07/24/2024:    Refill oxycodone 5mg TID 90 tabs for 30 days.  Refill hydroxyzine 25mg 1-2 tabs QID    Follow-up in three months in clinic.        Updates since last appointment on 06/24/2024.  Presents alone using a single point cane.    6/24/2024 filled hydrocodone 5/325mg TID for 3 wks since she overused last month for dental pain. Last dose this am.    Needs refills of oxycodone 5mg TID 90 tabs for 30 days.  Needs refills of hydroxyzine 25mg 1-2 tabs QID.    Neck pain after JONATHON decreased pain by 50%.    Mother in law is in hospice.          Interventions/Injections:   07/10/2024 Dr. El HOLT C7-T1  1/18/24 R) shoulder injection with Dr Gallegos: approx 65% improvement, also going to PT which is helpful.    Progress Notes Reviewed:  07/10/2024 Dr. El HOLT C7-T1      Any hospitalizations/ER/UC visits since last appointment:  no  Any falls/accidents since last appointment:  no      Primary Pain :  neck pain L=R on left radiating down b/l arms.  She is not sleeping well.  JONATHON decreased her pain by at least 50%.  She did PT in 04/2024 but she continues to do HEP.  Ice is helpful.      Characteristics:  Any changes in pain characteristics since last appointment?  Yes 50% less pain after JONATHON    The patient describes the pain as constant, stabbing, burning and numbness.     What makes the pain better:  Her pain is improved with laying down, rest.    What makes the pain worse:   She  reports that the pain is made worse by moving her head, using her arms, ADL's.    07/24/2024 current pain on 0/10 VAS:   1-2    Worst pain:   10 prior to JONATHON  06/07/2024 current pain on 0/10 VAS:   7    06/07/2024 current pain on 0/10 VAS:   7          03/15/2024 current pain on 0/10 VAS:   6          Current Pain Related Medications:  Any medications changes since last appointment: no      Start hydrocodone 5/325mg TID for 3 wks.    Amitriptyline 75mg q hs   Celexa 20mg 1.5 tabs daily 30mg daily  Cyclobenzaprine 10 mg TID PRN  Hydroxyzine 25mg 1-2 tabs QID anxiety  Zofran 8mg prn nausea with migraines  Oxycodone 5mg TID - last dose last saturday.    Stool softeners PRN  Imitrex 100mg PRN HA - per PCP    UDS and opioid agreement signed on 11/03/2023.        Therapies discuss on initial consult:   Physical therapy, Pain Psychology, TENs unit, Grounding Mat, Frequency Specific Micro Current, Anti-inflammatory Lifestyle    Social:    She is  and  lives in a house.  She has 1 grown son and 4 grandchildren.  She is independent in ADL's.      Employment: She is on SSD.    Exercise:  Last PT was 2018.  Patient exercises with stretching at home. Starting PT on Monday.    Hobbies:  She enjoys her grandchildren.    Mental Health:  Patient reports depression is under control on citalopram 30mg.  Patient does not follow with a mental health care provi              Plan on 06/24/2024:  Interventions:  JONATHON with Dr. Gallegos      Medications:  Start hydrocodone 5/325mg TID for 3 wks.    Amitriptyline 75mg q hs   Celexa 20mg 1.5 tabs daily 30mg daily  Cyclobenzaprine 10 mg TID PRN  Hydroxyzine 25mg 1-2 tabs QID anxiety  Zofran 8mg prn nausea with migraines  Oxycodone 5mg TID - last dose last saturday.    Stool softeners PRN  Imitrex 100mg PRN HA - per PCP    UDS and opioid agreement signed on 11/03/2023.      Follow up:   Follow-up in clinic in 2 wks after JONATHON with Dr. Gallegos.        Updates since last appointment on  06/07/2024   Hooper broke and had it repaired and developed dry socket.     She accidentally over used her oxycodone and ran out last Saturday night - last filled on 06/06/2024.  Discussed opioid agreement and she cannot self escalate the opioids for acute dental pain.  She acknowledged understanding.  If there is another violation of her opioid agreement  will have to taper off C2 opioids.    neck pain L=R on left radiating down b/l arms.  She is not sleeping well.  She had JONATHON many years ago at [place] which decreased her pain by at least 50% for 6 months.. She did PT in 04/2024 but she continues to do HEP.  Ice is helpful.    Started compounding cream with ketamine 8% and gabapentin 8% to neck, shoulder, feet and started triggering migraines so she stopped the medication.        Interventions/Injections:   1/18/24 R) shoulder injection with Dr Gallegos: approx 65% improvement, also going to PT which is helpful.      Progress Notes Reviewed:  04/27/2024 ED - [facility]  04/19/2024 Dr. Apurva Dobbs PM&R  04/11/2024 PT - R) shoulder pain  04/01/2024 ED - [facility]  03/15/2024 Sue Herrera, Pain Clinic    Any hospitalizations/ER/UC visits since last appointment:  no  Any falls/accidents since last appointment:  no      Primary Pain :  Neck and right shoulder      Characteristics:  No changes in pain characteristics since last appointment.    What makes the pain better:  Her pain is improved with laying down, rest.    What makes the pain worse:   She reports that the pain is made worse by moving her head, using her arms, ADL's.  06/07/2024 current pain on 0/10 VAS:   7    06/07/2024 current pain on 0/10 VAS:   7          03/15/2024 current pain on 0/10 VAS:   6         Current Pain Related Medications:  Any medications changes since last appointment: no    Amitriptyline 75mg q hs   Celexa 20mg 1.5 tabs daily 30mg daily  Compounding medication - stopped due to migraines  Cyclobenzaprine 10 mg TID PRN - needs refills  today  Hydroxyzine 25mg 1-2 tabs QID anxiety  Zofran 8mg prn nausea with migraines - needs refills today for migraines  Oxycodone 5mg TID - last dose this am.  Last script per  05/07/2024.  Stool softeners PRN  Imitrex 100mg PRN HA - per PCP      Interventions:  JONATHON is scheduled 07/10/2024  She would like to repeat R) shoulder injection in the future      Therapies discuss on initial consult:   Physical therapy, Pain Psychology, TENs unit, Grounding Mat, Frequency Specific Micro Current, Anti-inflammatory Lifestyle    Social:    She is  and  lives in a house.  She has 1 grown son and 4 grandchildren.  She is independent in ADL's.      Employment: She is on SSD.    Exercise:  Last PT was 2018.  Patient exercises with stretching at home. Starting PT on Monday.    Hobbies:  She enjoys her grandchildren.    Mental Health:  Patient reports depression is under control on citalopram 30mg.  Patient does not follow with a mental health care provider.            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  Stomach cancer was treated with gastrectomy and chemotherapy.  Doctors found stomach cancer when they did Whipple Surgery 01/2020 at [facility].  Patient has numbness and tingling in legs in the stocking foot distribution and b/l hands.  Patient has lumbar pain and has had lumbar MBB x 1 with Dr. Urbina.  Patient has not had any spine surgery in the past.  The patient describes the pain as constant, stabbing, burning and numbness.  She reports that the pain is made worse by prolong standing, ADL's.  Her pain is improved with laying down, rest.  She rates her currenty pain score at 7/10, but it can be as low as 3/10 or as severe as 9/10. Patient follows with Dr. Urbina for lumbar  intervention options.  Rheumatology could not find any cause for her joint pain.      Progress Notes Reviewed:  10/19/2023 Dr. Dobbs, PM&R - order quad cane and compression stockings, follow-up with Dr. Urbina for low back pain  10/17/2023 Dr. Mckoy, Rheumatology  10/01/2023 UC - UTI  09/27/2023 Dr. Urbina - lumbar MBB L4-5 and L5-S1  09/12/2023 Dr. Urbina    Patient reports depression is under control on citalopram 30mg.  Patient does not follow with a mental health care provider.  Last PT was 2018.  Patient exercises with stretching at home.    She denies any new problems with falls or balance, any new numbness or weakness of the arms or legs, any new bowel or bladder incontinence, any night sweats or unexplained fevers, or any sudden or unexpected weight loss.      Nathalie Bashir has been seen at a pain clinic in the past in [place] 10 years ago, [facility] in 1990s and  [facility].      Current Treatments:  Amitriptyline 75mg q hs - she ran out of this medication for sleep.  Not helpful for neuropathic pain.  Celexa 20mg 1.5 tabs daily 30mg daily  Compounding medication  Cyclobenzaprine 10 mg TID PRN  Hydroxyzine 25mg 1-2 tabs QID anxiety  Zofran 8mg prn nausea with migraines  Oxycodone 5mg TID - since 2020 with palliative care  Stool softeners PRN  Imitrex 100mg PRN HA    NOTE:  allergies to naprosyn, methadone, latex, ketorlac, gabapentin, acetaminophen, topiramate, pregabalin and oxycontin    Previous Medication Treatments Included:  Anti-convulsants: Gabapentin, pregabalin had negative SE  Muscle relaxors: Tizanidine not helpful  Anti-depressants: amitriptyline was not helpful for pain, duloxetine had negative SE with mood  Benzodiazapine's: none  Acetaminophen/NSAIDs: negative SE  Topicals: Voltaren gel 1%  Headache abortives: imitrex  Headache prophylactics:   Opioids: MS ER 30mg BID, Oxycodone 10mg BID at previous pain - [facility] in 1990, buprenorphine caused negative SE      Other Treatments Have  Included:  Physical therapy: yes over 2 years ago  Pain Psychology: no  Chiropractic: yes - not helpful  Acupuncture: yes - not helpful  TENs Unit: yes she has one  Injections: 09/27/2023 lumbar MBB #1  Surgeries: L) hip replacement 2015, L) SI fusion 2008  Dry Needling: no  Massage:no    Past Medical History:  Medical history reviewed.  Past Medical History:   Diagnosis Date    Allergic rhinitis     Cancer (H)     stomach cancer    Chronic pain     Closed fracture of distal end of left radius     Depression     Gastric adenocarcinoma (H)     Lumbago     Migraine     Opioid dependence (H)     Other chronic pain     low back    Sacroiliitis (H)     low back pain    Trochanteric bursitis     leg length discrrepancy, hip pain      Patient Active Problem List   Diagnosis    Lower limb length difference    IPMN (intraductal papillary mucinous neoplasm)    Allergic rhinitis    Arthralgia of hip    Bursitis    Chronic neck pain    Chronic pain disorder    Continuous opioid dependence (H)    Controlled substance agreement signed    Debility    Degeneration of intervertebral disc of lumbosacral region    Depression    Disorder of sacrum    Greater trochanteric bursitis of left hip    Infected prosthesis of left hip (H)    Chronic bilateral low back pain without sciatica    MDD (major depressive disorder), recurrent, in partial remission (H)    Migraine with aura    Sacroiliitis (H)    Screening for malignant neoplasm of cervix    Spondylosis of lumbosacral spine without myelopathy    Status post left hip replacement    Cervical radiculopathy, chronic    Unilateral inguinal hernia without obstruction or gangrene    Long term (current) use of opiate analgesic    Low urine output    Hypophosphatemia    Leukocytosis    Intra-abdominal fluid collection    Bacteremia due to Gram-negative bacteria    Malnutrition (H)    Gastric adenocarcinoma (H)    Pneumonia    Mucositis    Chemotherapy-induced neutropenia (H)    Gastric cancer  (H)    Cholangitis (H)    Thrombocytopenia (H)    Urinary tract infection    Crohn's disease of small intestine (H)    Jejunitis    Colitis    Abdominal pain    Malignant neoplasm of overlapping sites of stomach (H)    Iron deficiency anemia    Anemia    Paralytic ileus (H)    Hypokalemia    Nausea & vomiting    Dysphagia    History of gastric surgery    Chemotherapy-induced neuropathy (H)    Bilateral hand pain    Pyelonephritis    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  Past Surgical History:   Procedure Laterality Date    ARTHROPLASTY HIP Left 2016    BACK SURGERY      L4-5 decompression    CATARACT IOL, RT/LT      CHOLECYSTECTOMY N/A 1/28/2020    Procedure: Cholecystectomy;  Surgeon: Yandel Mallory MD;  Location: [facility] OR    ENDOSCOPIC RETROGRADE CHOLANGIOPANCREATOGRAM N/A 7/27/2020    Procedure: ENDOSCOPIC RETROGRADE CHOLANGIOPANCREATOGRAPHY  **Latex Allergy** with jejunal biopsies;  Surgeon: Jeet Aviles MD;  Location:  OR    ESOPHAGOSCOPY, GASTROSCOPY, DUODENOSCOPY (EGD), COMBINED N/A 3/3/2020    Procedure: ESOPHAGOGASTRODUODENOSCOPY, WITH ENDOSCOPIC US (enoxaparin);  Surgeon: Bakari Plata MD;  Location:  GI    ESOPHAGOSCOPY, GASTROSCOPY, DUODENOSCOPY (EGD), COMBINED N/A 12/7/2020    Procedure: Upper Endoscopy with stent placement;  Surgeon: Jeet Aviles MD;  Location:  OR    ESOPHAGOSCOPY, GASTROSCOPY, DUODENOSCOPY (EGD), COMBINED N/A 2/8/2021    Procedure: ESOPHAGOGASTRODUODENOSCOPY (EGD)AND STENT REMOVAL;  Surgeon: Jeet Aviles MD;  Location:  OR    EYE SURGERY      GASTRECTOMY N/A 10/21/2020    Procedure: Open subtotal gastectomy with David en Y Reconstruction; D1 Lymph Node Disection  **Latex Allergy**;   Surgeon: Yandel Mallory MD;  Location: [facility] OR    INJECT BLOCK MEDIAL BRANCH CERVICAL/THORACIC/LUMBAR Bilateral 9/27/2023    Procedure: BILATERAL L4-5, L5-S1 medial branch block #1;  Surgeon: Maria Victoria Urbina MD;  Location: [facility] OR    IR CHEST PORT PLACEMENT > 5 YRS OF AGE  3/26/2020    IR LUMBAR EPIDURAL STEROID INJECTION  8/25/2009    IR LUMBAR EPIDURAL STEROID INJECTION  9/2/2009    IR LUMBAR EPIDURAL STEROID INJECTION  9/25/2009    IR LUMBAR EPIDURAL STEROID INJECTION  11/23/2009    IR LUMBAR EPIDURAL STEROID INJECTION  4/1/2010    IR LUMBAR EPIDURAL STEROID INJECTION  9/1/2010    IR LUMBAR EPIDURAL STEROID INJECTION  3/10/2011    IR LUMBAR EPIDURAL STEROID INJECTION  8/30/2011    IR PICC PLACEMENT > 5 YRS OF AGE  10/12/2016    IR PORT REMOVAL RIGHT  12/8/2023    LAPAROSCOPY DIAGNOSTIC (GENERAL) N/A 10/13/2020    Procedure: Diagnostic laparoscopy with peritoneal washings  **Latex Allergy**;  Surgeon: Yandel Mallory MD;  Location: [facility] OR    OPEN REDUCTION INTERNAL FIXATION RODDING INTRAMEDULLARY FEMUR Left 12/23/2014    Procedure: OPEN REDUCTION INTERNAL FIXATION RODDING INTRAMEDULLARY FEMUR;  Surgeon: Arnol Santiago MD;  Location: [facility] OR    OPEN REDUCTION INTERNAL FIXATION WRIST Left 9/22/2022    Procedure: LEFT WRIST OPEN REDUCTION INTERNAL FIXATION, FRACTURE;  Surgeon: Mark Villalobos MD;  Location: [facility] [place]    ORTHOPEDIC SURGERY      PICC DOUBLE LUMEN PLACEMENT Right 02/07/2020    5 fr double lumen 41 cm    AK LAMINEC/FACETECT/FORAMIN,LUMBAR 1 SEG      Description: Laminectomy Decompress, Facetectomy, Foraminotomy Lumbar Seg;  Recorded: 04/12/2012;    REMOVE HARDWARE LOWER EXTREMITY Left 4/7/2015    Procedure: REMOVE HARDWARE LOWER EXTREMITY;  Surgeon: Arnol Santiago MD;  Location: [facility] OR    REMOVE HARDWARE RODDING INTRAMEDULLARY FEMUR Left 12/17/2015    Procedure: REMOVE HARDWARE RODDING INTRAMEDULLARY FEMUR;  Surgeon: Arnol Santiago MD;  [facility] OR    RESECT BONE LOWER EXTREMITY Left 12/23/2014     Procedure: RESECT BONE LOWER EXTREMITY;  Surgeon: Arnol Santiago MD;  Location: [facility] OR    SHORTENING OSTEOPLASTY FEMUR W/ IM NAILING      WHIPPLE PROCEDURE N/A 1/28/2020    Procedure: Open Whipple procedure and Cholecystectomy;  Surgeon: Yandel Mallory MD;  Location: [facility] OR    WHIPPLE PROCEDURE      Z FUSION OF SACROILIAC JOINT      Description: Arthrodesis Sacroiliac Joint Left;  Recorded: 10/07/2011;    ZC REPAIR DETACH RETINA,SCLERAL BUCKLE          Medications: Pertinent medications reviewed.  Current Outpatient Medications   Medication Sig Dispense Refill    amitriptyline (ELAVIL) 75 MG tablet Take 1 tablet (75 mg) by mouth at bedtime 30 tablet 3    ciprofloxacin (CIPRO) 500 MG tablet Take 1 tablet (500 mg) by mouth 2 times daily. (Patient not taking: Reported on 12/17/2024) 10 tablet 0    citalopram (CELEXA) 20 MG tablet Take 1.5 tablets (30 mg) by mouth daily 135 tablet 1    COMPOUNDED NON-CONTROLLED SUBSTANCE (CMPD RX) - PHARMACY TO MIX COMPOUNDED MEDICATION Estriol 1 mg/gram,place 1 gram vaginally three times per week,42.5 grams (Patient not taking: Reported on 12/17/2024) 1 each 3    Cyanocobalamin 1000 MCG CAPS Take 1 tablet by mouth every morning      cyclobenzaprine (FLEXERIL) 10 MG tablet TAKE 1 TABLET BY MOUTH 3 TIMES DAILY AS NEEDED FOR MUSCLE SPASMS 90 tablet 3    famotidine (PEPCID) 20 MG tablet TAKE 1 TABLET (20 MG) BY MOUTH 2 TIMES DAILY AS NEEDED 180 tablet 1    fluticasone (FLONASE) 50 MCG/ACT nasal spray Spray 1 spray into both nostrils daily at 2 pm      loperamide (IMODIUM) 2 MG capsule Take 4 mg by mouth 4 times daily as needed for diarrhea (Patient not taking: Reported on 12/17/2024)      loratadine-pseudoePHEDrine (CLARITIN-D 24-HOUR)  MG 24 hr tablet Take 1 tablet by mouth daily as needed for allergies Usually takes in Spring/Fall      molnupiravir (LAGEVRIO) 200 MG capsule Take 4 capsules (800 mg) by mouth every 12 hours 40 each 0    ondansetron (ZOFRAN) 8 MG tablet Take 1  tablet (8 mg) by mouth every 8 hours as needed (Nausea/Vomiting) 90 tablet 2    oxyCODONE (ROXICODONE) 5 MG tablet Take 1 tablet (5 mg) by mouth 3 times daily as needed for breakthrough pain or moderate to severe pain. Maximum 3 pills/day. 90 tablet 0    pantoprazole (PROTONIX) 40 MG EC tablet TAKE 1 TABLET BY MOUTH 2 TIMES DAILY 180 tablet 1    sennosides (SENOKOT) 8.6 MG tablet 1-2 tablets 1-2 times daily as needed. 120 tablet 1    simethicone 80 MG TABS Take 1 tablet by mouth every 6 hours as needed (bloating, gas, belching) 90 tablet 3    SUMAtriptan (IMITREX) 100 MG tablet Take 100 mg by mouth at onset of headache for migraine         [place] Prescription Monitoring Program reviewed 10/29/2024.  No concern for abuse or misuse of controlled medications based on this report.    11/26/2024 Hydrocodone/act 5/325mg 90 tabs for 30 days  10/29/2024 Hydrocodone/act 5/325mg 90 tabs for 30 days  10/16/2024 Oxycodone 5mg 90 tabs for 30 days  09/18/2024 Oxycodone 5mg 90 tabs for 30 days  08/21/2024 Oxycodone 5mg 90 tabs for 30 days  07/24/2024 Oxycodone 5mg 90 tabs for 30 days  06/24/2024 Hydrocodone-acet 5/325mg 75 tabs for 25 ays  06/06/2024 Oxycodone 5mg 90 tabs for 30 days  05/07/2024 Oxycodone 5mg 90 tabs for 30 days  04/07/2024 Oxycodone 5mg 90 tabs for 30 days  Monthly scripts for oxycodone in 2024 no other medications listed.      THE 4 A's OF OPIOID MAINTENANCE ANALGESIA    Analgesia: Patient reports decrease pain on current pain medications and doses at a tolerable level.    Activity: She is independent in ADL's and report improved function with pain regimen    Adverse effects: She denies any significant negative side effects from her pain medications.    Adherence to Rx protocol: Patient is taking her medications as prescribed with no signs of misuse or abuse.      Allergies: Pertinent allergies reviewed.     Allergies   Allergen Reactions    Gluten Meal Palpitations    Amoxicillin-Pot Clavulanate Rash     Patient  tolerated Zosyn in 7/2020    Oxycontin [Oxycodone] Other (See Comments)     Confusion, loopy, oxycodone is tolerated.    Pt states she tolerates regular oxycodone, cannot take 12 hour oxycontin.      Pregabalin      interfered with Cymbalta    Topiramate      Tongue numbness, taste alteration, irritable     Acetaminophen Nausea     Urine retention      Dust Mite Extract     Gabapentin Dizziness and GI Disturbance    Ketorolac Headache    Latex Rash    Methadone Fatigue    Mold     Naprosyn [Naproxen] Dizziness and GI Disturbance    Seasonal Allergies        Family History:   family history includes Dementia in her father; Heart Failure (age of onset: 77) in her mother; No Known Problems in her brother and sister.    Social History:   She is  and  lives in a house.  She has 1 grown child.  She is on SSD.  She is independent in ADL's.  She has 4 grand children who live in [place].  She  reports that she quit smoking about 4 years ago. Her smoking use included cigarettes. She started smoking about 30 years ago. She has a 12.5 pack-year smoking history. She has been exposed to tobacco smoke. She has never used smokeless tobacco. She reports that she does not drink alcohol and does not use drugs.  Social History     Social History Narrative    Not on file       Physical Exam:  /69   Pulse 97   Wt 70.8 kg (156 lb)   LMP 09/23/2014   SpO2 100%   BMI 23.72 kg/m      Constitutional: She is oriented to person, place, and time.  She appears well-developed and well-nourished. She is not in acute distress.   HENT:     Head: Normocephalic and atraumatic.     Eyes: Pupils are equal, round, and reactive to light. EOM are normal. No scleral icterus.   Pulmonary/Chest:  NWOB. No respiratory distress.   Neurological: She is alert and oriented to person, place, and time. Coordination grossly normal.    Skin: Skin is warm and dry. She is not diaphoretic.   Psychiatric: She has a normal mood and affect. Her behavior  is normal. Judgment and thought content normal.  Patient answers questions appropriately.  MSK: Gait is antalgic.        Narrative & Impression   EXAMINATION: CT CHEST/ABDOMEN/PELVIS W CONTRAST, 11/13/2023 12:03 PM     TECHNIQUE:  Helical CT images from the thoracic inlet through the  symphysis pubis were obtained with IV contrast. Contrast dose: Isovue  370 88cc     COMPARISON: CT abdomen and pelvis dated 8/30/2023, CT chest abdomen  pelvis dated 5/17/2023.     HISTORY: Malignant neoplasm of pyloric antrum (H)     FINDINGS:  CHEST:  LUNGS: Central tracheobronchial tree is patent. No pneumothorax or  pleural effusion. No focal airspace opacity. No suspicious pulmonary  nodule. Unchanged subcentimeter nodule along the left fissure likely  represents an intrafissural lymph node. Stable few sub-4 mm pulmonary  nodules are likely benign.      MEDIASTINUM: Right port catheter terminates in distal SVC. Heart size  is within normal limits. No pericardial effusion. Ascending aorta and  main pulmonary artery diameters are within normal limits. Normal  appearance and configuration of the great vessels off of the aortic  arch. No suspicious mediastinal, hilar, or axillary lymph nodes.      Visualized thyroid and esophagus are unremarkable.     ABDOMEN/PELVIS:  LIVER: No suspicious focal hepatic lesion.     BILIARY: Gallbladder is surgically absent. Unchanged mild pneumobilia.  No intrahepatic or extrahepatic biliary ductal dilation.     PANCREAS: Post Whipple's procedure. No focal pancreatic lesion. No  pancreatic duct dilatation.     SPLEEN: Within normal limits.     ADRENAL GLANDS: No focal adrenal nodule.     URINARY TRACT: No suspicious renal lesion. No hydronephrosis or  hydroureter. No renal stone. Urinary bladder is unremarkable.     REPRODUCTIVE ORGANS: Within normal limits.     BOWEL: Partial gastrectomy and gastrojejunostomy. Extensive colonic  diverticulosis. Normal caliber large and small bowel. No abnormal  bowel  wall thickening or enhancement. Appendix is unremarkable.     PERITONEUM/FLUID: No ascites or pelvic free fluid.     VESSELS: No aneurysmal dilatation of the abdominal aorta.  The portal,  splenic, and superior mesenteric veins are patent.  The origins of the  celiac and superior mesenteric arteries are patent.     LYMPH NODES: No lymphadenopathy.     BONES/SOFT TISSUES: No aggressive osseous lesions. Degenerative  changes in the spine. L1 hemangioma. Few chronic right rib fractures.  Stable small circumscribed lucent focus in the sternum is likely  benign. Left hip arthroplasty. Left SI joint fixation hardware.  Changes of right inguinal hernia repair.                                                                       IMPRESSION:   No evidence of metastatic disease in the chest, abdomen, or pelvis.     CHERYL SOLIZ MD            EXAM: MR SHOULDER RIGHT WITHOUT CONTRAST  LOCATION: [facility]  DATE: 11/18/2023     INDICATION: Shoulder pain.  COMPARISON: None.  TECHNIQUE: Unenhanced.     FINDINGS:     ROTATOR CUFF:  -Supraspinatus: Mild tendinosis without tearing. Normal muscle without strain or atrophy.  -Infraspinatus: No tendon tear, tendinopathy or fatty atrophy.  -Subscapularis: Mild tendinosis without tear or tendon thinning. Normal muscle without edema or atrophy.  -Teres minor: Normal tendon and muscle.     CORACOACROMIAL ARCH:  -Morphology: Developmental variant os acromiale. Small amount of fluid at the pseudoarticulation. No significant inflammatory changes or stress reaction. Type I acromial morphology. No subacromial spur. Subacromial and subcoracoid space are normal.   -Bursa: Mild subacromial/subdeltoid bursitis.     ACROMIOCLAVICULAR JOINT:   -Normal alignment. Minimal degenerative arthrosis.      LONG HEAD OF BICEPS TENDON:   -Small tenosynovial effusion. Normal tendon without tear or tendinosis.     GLENOHUMERAL JOINT:   -Labrum: Degenerative surface fraying and  blunting of the labrum. No full-thickness tear/avulsion.   -Cartilage: Smooth high-grade cartilage loss, with full-thickness thinning over the glenoid, and mixed high-grade partial-thickness and full-thickness thinning over the humeral head.  -Joint space: Minimally increased joint fluid. Mild synovitis. No loose intra-articular bodies.  -Glenohumeral ligaments and capsule: No pericapsular inflammation.     BONES:   -Negative for fracture, AVN, or bone marrow replacement lesion.  -End-stage degenerative arthritic changes, with marginal osteophytes.     SOFT TISSUES:   -Normal deltoid muscle bulk. Normal visualized chest wall and axilla.                                                                      IMPRESSION:  1.  Advanced osteoarthrosis, with high-grade cartilage loss, including areas of exposed subchondral bone over the glenoid and humeral head. Large marginal osteophytes.     2.  Minimally increased glenohumeral joint fluid. Mild synovitis. No loose bodies.     3.  Intact rotator cuff without full-thickness or partial-thickness tear. Mild supraspinatus and subscapularis tendinosis.     4.  Mild subacromial/subdeltoid bursitis.     5.  Small biceps tenosynovial effusion. No tendon tearing or significant tendinosis.     6.  Incidentally identified os acromiale developmental variant morphology.      Narrative & Impression   EXAM: MR LUMBAR SPINE W/O CONTRAST  LOCATION: [facility]  DATE: 9/3/2023     INDICATION: History of lumbar lami (+10yrs), primarily axial LBP>BLE radicular pain.  COMPARISON: Plain film imaging 08/29/2023 of the lumbar spine, prior body CT 08/30/2023 also reviewed.  TECHNIQUE: Routine Lumbar Spine MRI without IV contrast.     FINDINGS:   Nomenclature is based on 5 lumbar type vertebral bodies. Rudimentary 12th ribs are identified. Partial sacralization left L5. Correlate with plain film imaging if there is future plan for intervention. Satisfactory lumbar vertebral  body height. 2 mm   degenerative anterior subluxation L3-L4 as on prior body CT. No high-grade subluxations. No marrow or ligamentous edema. Nothing for sacral insufficiency or stress fracture. Nothing for a marrow-infiltrative process. Benign vertebral body hemangioma at   L1. Interspace narrowing lower thoracic level and at L3-L4. Congenital narrowing of the L5-S1 interspace. Left SI joint space fusion hardware with degenerative changes right SI joint. Sacral neural foramina are intact. Normal distal spinal cord and cauda   equina with conus medullaris at L1. Rightward lumbar curve. Nerve roots of the cauda equina are satisfactory without nodularity thickening. No retroperitoneal solid mass or adenopathy. Symmetric psoas appearance. Normal caliber abdominal aorta.   Satisfactory renal contours.     T12-L1: Moderate loss of disc height with annular bulge. No disc herniation, spinal stenosis or foraminal narrowing is present. Facet joints are satisfactory.      L1-L2: Normal disc height and signal. Mild annular bulge. No herniation. Normal facets. No spinal canal or neural foraminal stenosis.     L2-L3: Mild loss of disc height with annular bulge. No disc herniation. No spinal stenosis. Mild foraminal narrowing bilaterally. Mild facet joint hypertrophic changes are present.      L3-L4: Mild loss of disc height. Low-grade degenerative anterior subluxation. Generalized disc bulge. No disc herniation. Mild foraminal narrowing inferiorly. No spinal stenosis. Mild facet joint arthropathy bilaterally.     L4-L5: Normal disc height and signal. Annular bulge. No herniation. Normal facets. No spinal canal or right neural foraminal stenosis. Mild left foraminal narrowing.     L5-S1: Partial sacralization on the left. Interspace narrowing on a congenital/developmental basis with disc desiccation. No disc herniation. No spinal stenosis or foraminal narrowing.                                                                       IMPRESSION:  1.  See above for counting nomenclature, stable from prior body CT examination 08/30/2023 with partial sacralization left L5. Correlate with plain film imaging if there is future plan for intervention.     2.  Low-grade degenerative anterior subluxation L3 upon L4. No high-grade subluxations. No marrow or ligamentous edema.     3.  No evidence for severe spinal stenosis or severe foraminal compromise at any lumbar level.     4.  See above for additional details and full level by level description.         EXAM: XR SACRUM/COCCYX   [facility]   DATE/TIME: 6/14/2021 10:39 AM     INDICATION: Tailbone pain for a year, history of adenocarcinoma of the stomach   COMPARISON: None.     IMPRESSION: Postsurgical change with cage at the left SI joint. Left hip arthroplasty. No fracture or malalignment. No osseous lesion. L5-S1 degenerative disc changes with facet arthrosis. Right hip is intact.     EXAM: XR SHOULDER RIGHT G/E 3 VIEWS  LOCATION: [facility]  DATE/TIME: 9/18/2022 12:10 PM     INDICATION: Pain following fall.  COMPARISON: None.                                                                      IMPRESSION: No fractures are identified. Normal glenohumeral alignment. Mild degenerative changes in the glenohumeral joint. The acromioclavicular joint is unremarkable. Right-sided port catheter.      Narrative & Impression   CT CERVICAL SPINE W/O CONTRAST 9/18/2022 12:14 PM     Provided History: neck pain post fall     Comparison: CT chest and pelvis 11/15/2021     Technique: Using multidetector thin collimation helical acquisition  technique, axial, coronal and sagittal CT images through the cervical  spine were obtained without intravenous contrast.      Findings:     The cervical vertebrae are normally aligned. Straightening of the  cervical curvature.      No acute fracture or traumatic subluxation. No prevertebral edema.      There is  multilevel disc height narrowing throughout. Multilevel  cervical spondylosis with disc osteophyte complexes, uncinate  spurring, and facet arthropathy. Mild spinal canal narrowing at C4-5  and C5-6. Mild to moderate bilateral neural foraminal narrowing at  C4-5, mild to moderate right and mild left neural stenosis at C5-6.     No abnormality of the paraspinous soft tissues.                                                                      Impression:   1. No acute fracture or traumatic subluxation.  2. Multilevel cervical spondylosis, with minimal spinal canal  narrowing at C4-C5 and C5-C6.     I have personally reviewed the examination and initial interpretation  and I agree with the findings.       EMG:  na      Diagnosis:  (M25.511,  G89.29) Chronic right shoulder pain  (primary encounter diagnosis)  Comment:   Plan:     (M54.12) Cervical radiculopathy  Comment:   Plan:     (G89.29) Chronic intractable pain  Comment:   Plan:     (F11.90) Chronic, continuous use of opioids  Comment:   Plan:       Plan on 12/24/2024:    Follow-up in 3 months in clinic.    Rotate to oxycodone 5mg TID prn from hydrocodone 5/325 which caused heartburn.    Keep appt for JONATHON in January.    AVS per My Chart.      Desi Amaya CNP          BILLING TIME DOCUMENTATION:   The total TIME spent on this patient on the date of the encounter/appointment was 33 minutes.

## 2024-12-24 NOTE — PATIENT INSTRUCTIONS
Plan on 12/24/2024:    Follow-up in 3 months.    Rotate to oxycodone 5mg TID prn.    Keep appt for JONATHON in January.      Desi Amaya CNP            Clinic Number:  [phone]   Call with any questions about your care and for scheduling assistance.   Calls are returned Monday through Friday between 8 AM and 4:30 PM. We usually get back to you within 2 business days depending on the issue/request.    If we are prescribing your medications:  For opioid medication refills, call the clinic or send a Libox message 7 days in advance.  Please include:  Name of requested medication  Name of the pharmacy.  For non-opioid medications, call your pharmacy directly to request a refill. Please allow 3-4 days to be processed.   Per [place] State Law:  All controlled substance prescriptions must be filled within 30 days of being written.    For those controlled substances allowing refills, pickup must occur within 30 days of last fill.      We believe regular attendance is key to your success in our program!    Any time you are unable to keep your appointment we ask that you call us at least 24 hours in advance to cancel.This will allow us to offer the appointment time to another patient.   Multiple missed appointments may lead to dismissal from the clinic.

## 2024-12-24 NOTE — PROGRESS NOTES
Patient presents to the clinic today for a visit  with NINA Kirkpatrick CNP            7/24/2024    10:49 AM 10/29/2024    10:52 AM 12/24/2024    10:46 AM   PEG Score   PEG Total Score 5.33 5.33 6       UDS/CSA-10/29/24    Medications-    QUESTIONS:    Jada Quiñones MA  [facility] [place]

## 2025-01-02 ENCOUNTER — LAB (OUTPATIENT)
Dept: LAB | Facility: CLINIC | Age: 60
End: 2025-01-02
Payer: MEDICARE

## 2025-01-02 ENCOUNTER — OFFICE VISIT (OUTPATIENT)
Dept: INFECTIOUS DISEASES | Facility: CLINIC | Age: 60
End: 2025-01-02
Payer: MEDICARE

## 2025-01-02 VITALS
BODY MASS INDEX: 23.87 KG/M2 | WEIGHT: 157 LBS | DIASTOLIC BLOOD PRESSURE: 64 MMHG | TEMPERATURE: 98.7 F | SYSTOLIC BLOOD PRESSURE: 110 MMHG | HEART RATE: 96 BPM

## 2025-01-02 DIAGNOSIS — Z16.30 ANTIBIOTIC-RESISTANT BACTERIAL INFECTION: ICD-10-CM

## 2025-01-02 DIAGNOSIS — A49.9 ANTIBIOTIC-RESISTANT BACTERIAL INFECTION: ICD-10-CM

## 2025-01-02 DIAGNOSIS — R82.71 ASYMPTOMATIC BACTERIURIA: Primary | ICD-10-CM

## 2025-01-02 LAB
ALBUMIN UR-MCNC: NEGATIVE MG/DL
APPEARANCE UR: CLEAR
BACTERIA #/AREA URNS HPF: ABNORMAL /HPF
BILIRUB UR QL STRIP: NEGATIVE
COLOR UR AUTO: YELLOW
GLUCOSE UR STRIP-MCNC: NEGATIVE MG/DL
HGB UR QL STRIP: NEGATIVE
KETONES UR STRIP-MCNC: NEGATIVE MG/DL
LEUKOCYTE ESTERASE UR QL STRIP: ABNORMAL
NITRATE UR QL: POSITIVE
PH UR STRIP: 6.5 [PH] (ref 5–8)
RBC #/AREA URNS AUTO: ABNORMAL /HPF
SP GR UR STRIP: 1.01 (ref 1–1.03)
SQUAMOUS #/AREA URNS AUTO: ABNORMAL /LPF
TRANS CELLS #/AREA URNS HPF: ABNORMAL /HPF
UROBILINOGEN UR STRIP-ACNC: 0.2 E.U./DL
WBC #/AREA URNS AUTO: ABNORMAL /HPF
WBC CLUMPS #/AREA URNS HPF: PRESENT /HPF

## 2025-01-02 PROCEDURE — 87088 URINE BACTERIA CULTURE: CPT

## 2025-01-02 PROCEDURE — 81001 URINALYSIS AUTO W/SCOPE: CPT

## 2025-01-02 PROCEDURE — 87186 SC STD MICRODIL/AGAR DIL: CPT

## 2025-01-02 PROCEDURE — 87086 URINE CULTURE/COLONY COUNT: CPT

## 2025-01-02 NOTE — PROGRESS NOTES
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    22 urine cultures have been done since July of 2020 with the majority having 2 species of bacteria, low colony counts or obvious mixed/contamination of the sample.  Has been heavily treated over time for these cultures so now ESBL is present.  Most recent rx fosfomycin.  Per pt urology has not found a reason for this problem.  Sxs of UTI are \"migraine\" or back pain.  Pt seemed to understand my opinion that not checking urines and not treating what we find is the only way out of this box.          Pertinent past history, past infectious disease history:         Past Medical History:   Diagnosis Date    Allergic rhinitis      Cancer (H)      Chronic pain      Closed fracture of distal end of left radius      Depression      Gastric adenocarcinoma (H)      Lumbago      Migraine      Opioid dependence (H)      Other chronic pain      Sacroiliitis (H)      Trochanteric bursitis                  Patient Active Problem List   Diagnosis    Lower limb length difference    IPMN (intraductal papillary mucinous neoplasm)    Allergic rhinitis    Arthralgia of hip    Bursitis    Chronic neck pain    Chronic pain disorder    Continuous opioid dependence (H)    Controlled substance agreement signed    Debility    Degeneration of intervertebral disc of lumbosacral region    Depression    Disorder of sacrum    Greater trochanteric bursitis of left hip    Infected prosthesis of left hip (H)    Chronic bilateral low back pain without sciatica    MDD (major depressive disorder), recurrent, in partial remission (H)    Migraine with aura    Sacroiliitis (H)    Screening for malignant neoplasm of cervix    Spondylosis of lumbosacral spine without myelopathy    Status post left hip replacement    Cervical radiculopathy, chronic    Unilateral inguinal hernia without obstruction or gangrene    Long term (current) " use of opiate analgesic    Low urine output    Hypophosphatemia    Leukocytosis    Intra-abdominal fluid collection    Bacteremia due to Gram-negative bacteria    Malnutrition (H)    Gastric adenocarcinoma (H)    Pneumonia    Mucositis    Chemotherapy-induced neutropenia (H)    Gastric cancer (H)    Cholangitis (H)    Thrombocytopenia (H)    Urinary tract infection    Crohn's disease of small intestine (H)    Jejunitis    Colitis    Abdominal pain    Malignant neoplasm of overlapping sites of stomach (H)    Iron deficiency anemia    Anemia    Paralytic ileus (H)    Hypokalemia    Nausea & vomiting    Dysphagia    History of gastric surgery    Chemotherapy-induced neuropathy (H)    Bilateral hand pain    Pyelonephritis    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)    Other closed intra-articular fracture of distal end of left radius, initial encounter    Lumbar facet arthropathy    Lumbar spondylosis    H/O Whipple procedure      Past Surgical History         Past Surgical History:   Procedure Laterality Date    ARTHROPLASTY HIP Left 2016    BACK SURGERY         L4-5 decompression    CATARACT IOL, RT/LT        CHOLECYSTECTOMY N/A 1/28/2020     Procedure: Cholecystectomy;  Surgeon: Yandel Mallory MD;  [facility]    ENDOSCOPIC RETROGRADE CHOLANGIOPANCREATOGRAM N/A 7/27/2020     Procedure: ENDOSCOPIC RETROGRADE CHOLANGIOPANCREATOGRAPHY  **Latex Allergy** with jejunal biopsies;  Surgeon: Jeet Aviles MD;  Location:  OR    ESOPHAGOSCOPY, GASTROSCOPY, DUODENOSCOPY (EGD), COMBINED N/A 3/3/2020     Procedure: ESOPHAGOGASTRODUODENOSCOPY, WITH ENDOSCOPIC US (enoxaparin);  Surgeon: Bakari Plata MD;  Location:  GI    ESOPHAGOSCOPY, GASTROSCOPY, DUODENOSCOPY (EGD), COMBINED N/A 12/7/2020     Procedure: Upper Endoscopy with stent placement;  Surgeon: Jeet Aviles MD;  Location:  OR    ESOPHAGOSCOPY, GASTROSCOPY, DUODENOSCOPY (EGD), COMBINED N/A 2/8/2021     Procedure:  ESOPHAGOGASTRODUODENOSCOPY (EGD)AND STENT REMOVAL;  Surgeon: Jeet Aviles MD;  Location:  OR    EYE SURGERY        GASTRECTOMY N/A 10/21/2020     Procedure: Open subtotal gastectomy with David en Y Reconstruction; D1 Lymph Node Disection  **Latex Allergy**;  Surgeon: Yandel Mallory MD;  [facility]: [facility]    INJECT BLOCK MEDIAL BRANCH CERVICAL/THORACIC/LUMBAR Bilateral 9/27/2023     Procedure: BILATERAL L4-5, L5-S1 medial branch block #1;  Surgeon: Maria Victoria Urbina MD;  Location: [facility] OR    IR CHEST PORT PLACEMENT > 5 YRS OF AGE   3/26/2020    IR LUMBAR EPIDURAL STEROID INJECTION   8/25/2009    IR LUMBAR EPIDURAL STEROID INJECTION   9/2/2009    IR LUMBAR EPIDURAL STEROID INJECTION   9/25/2009    IR LUMBAR EPIDURAL STEROID INJECTION   11/23/2009    IR LUMBAR EPIDURAL STEROID INJECTION   4/1/2010    IR LUMBAR EPIDURAL STEROID INJECTION   9/1/2010    IR LUMBAR EPIDURAL STEROID INJECTION   3/10/2011    IR LUMBAR EPIDURAL STEROID INJECTION   8/30/2011    IR PICC PLACEMENT > 5 YRS OF AGE   10/12/2016    IR PORT REMOVAL RIGHT   12/8/2023    LAPAROSCOPY DIAGNOSTIC (GENERAL) N/A 10/13/2020     Procedure: Diagnostic laparoscopy with peritoneal washings  **Latex Allergy**;  Surgeon: Yandel Mallory MD;  [facility]    OPEN REDUCTION INTERNAL FIXATION RODDING INTRAMEDULLARY FEMUR Left 12/23/2014     Procedure: OPEN REDUCTION INTERNAL FIXATION RODDING INTRAMEDULLARY FEMUR;  Surgeon: Arnol Santiago MD;  Location: [facility]    OPEN REDUCTION INTERNAL FIXATION WRIST Left 9/22/2022     Procedure: LEFT WRIST OPEN REDUCTION INTERNAL FIXATION, FRACTURE;  Surgeon: Mark Villalobos MD;  Location: [facility] OR    ORTHOPEDIC SURGERY        PICC DOUBLE LUMEN PLACEMENT Right 02/07/2020     5 fr double lumen 41 cm    [place] LAMINEC/FACETECT/FORAMIN,LUMBAR 1 SEG         Description: Laminectomy Decompress, Facetectomy, Foraminotomy Lumbar Seg;  Recorded: 04/12/2012;    REMOVE HARDWARE LOWER EXTREMITY Left 4/7/2015     Procedure: REMOVE HARDWARE  LOWER EXTREMITY;  Surgeon: Arnol Santiago MD;  [facility]    REMOVE HARDWARE RODDING INTRAMEDULLARY FEMUR Left 12/17/2015     Procedure: REMOVE HARDWARE RODDING INTRAMEDULLARY FEMUR;  Surgeon: Arnol Santiago MD;  [facility]    RESECT BONE LOWER EXTREMITY Left 12/23/2014     Procedure: RESECT BONE LOWER EXTREMITY;  Surgeon: Arnol Santiago MD;  [facility]    SHORTENING OSTEOPLASTY FEMUR W/ IM NAILING        WHIPPLE PROCEDURE N/A 1/28/2020     Procedure: Open Whipple procedure and Cholecystectomy;  Surgeon: Yandel Mallory MD;  [facility]    WHIPPLE PROCEDURE        ZZC FUSION OF SACROILIAC JOINT         Description: Arthrodesis Sacroiliac Joint Left;  Recorded: 10/07/2011;    ZZC REPAIR DETACH RETINA,SCLERAL BUCKLE                       Medications:  Reviewed prior to admission meds as applicable in chart review.  Current meds are reviewed in the EMR listed MAR.     ANTIBIOTICS:    Current:none   Prior:fosfo   Allergy to:    SH/FH and  travel history(if applicable to consult):  Quit smoker, Father with dementia    REVIEW OF SYSTEMS:  All other systems negative    EXAMINATION:  /64 (BP Location: Right arm, Patient Position: Sitting, Cuff Size: Adult Regular)   Pulse 96   Temp 98.7  F (37.1  C) (Oral)   Wt 71.2 kg (157 lb)   LMP 09/23/2014   BMI 23.87 kg/m    Alert, awake  Vitals tabulated above, reviewed  HEENT:nl  Neck supple without lymphadenopathy  Sclera clear  CARDIOVASCULAR regular rate and rhythm, no murmur  Lungs CLEAR TO AUSCULTATION   Abdomen soft, NT/ND, absent HEPATOSPLENOMEGALY  Skin normal  Joints normal  Neurologic exam non focal  Wound:  NA        CLINICAL DATABASE FOR---LAB/MICRO/CULTURES/IMAGING STUDIES:  Lab Results   Component Value Date    WBC 5.0 12/02/2024    WBC 4.8 05/21/2021     Lab Results   Component Value Date    RBC 3.77 12/02/2024    RBC 3.90 05/21/2021     Lab Results   Component Value Date    HGB 11.7 12/02/2024    HGB 12.2 05/21/2021     Lab  "Results   Component Value Date    HCT 35.3 12/02/2024    HCT 38.2 05/21/2021     No components found for: \"MCT\"  Lab Results   Component Value Date    MCV 94 12/02/2024    MCV 98 05/21/2021     Lab Results   Component Value Date    MCH 31.0 12/02/2024    MCH 31.3 05/21/2021     Lab Results   Component Value Date    MCHC 33.1 12/02/2024    MCHC 31.9 05/21/2021     Lab Results   Component Value Date    RDW 13.8 12/02/2024    RDW 13.0 05/21/2021     Lab Results   Component Value Date     12/02/2024     05/21/2021       Last Comprehensive Metabolic Panel:  Sodium   Date Value Ref Range Status   12/02/2024 137 135 - 145 mmol/L Final   05/21/2021 139 133 - 144 mmol/L Final     Potassium   Date Value Ref Range Status   12/02/2024 4.6 3.4 - 5.3 mmol/L Final   08/26/2022 5.0 3.4 - 5.3 mmol/L Final   05/21/2021 4.2 3.4 - 5.3 mmol/L Final     Chloride   Date Value Ref Range Status   12/02/2024 103 98 - 107 mmol/L Final   08/26/2022 110 (H) 94 - 109 mmol/L Final   05/21/2021 107 94 - 109 mmol/L Final     Carbon Dioxide   Date Value Ref Range Status   05/21/2021 29 20 - 32 mmol/L Final     Carbon Dioxide (CO2)   Date Value Ref Range Status   12/02/2024 27 22 - 29 mmol/L Final   08/26/2022 30 20 - 32 mmol/L Final     Anion Gap   Date Value Ref Range Status   12/02/2024 7 7 - 15 mmol/L Final   08/26/2022 3 3 - 14 mmol/L Final   05/21/2021 3 3 - 14 mmol/L Final     Glucose   Date Value Ref Range Status   12/02/2024 119 (H) 70 - 99 mg/dL Final   08/26/2022 99 70 - 99 mg/dL Final   05/21/2021 102 (H) 70 - 99 mg/dL Final     Urea Nitrogen   Date Value Ref Range Status   12/02/2024 8.1 6.0 - 20.0 mg/dL Final   08/26/2022 7 7 - 30 mg/dL Final   05/21/2021 12 7 - 30 mg/dL Final     Creatinine   Date Value Ref Range Status   12/02/2024 0.85 0.51 - 0.95 mg/dL Final   05/21/2021 0.93 0.52 - 1.04 mg/dL Final     GFR Estimate   Date Value Ref Range Status   12/02/2024 79 >60 mL/min/1.73m2 Final     Comment:     eGFR calculated " using 2021 CKD-EPI equation.   05/21/2021 70 >60 mL/min/[1.73_m2] Final     Comment:     Non  GFR Calc  Starting 12/18/2018, serum creatinine based estimated GFR (eGFR) will be   calculated using the Chronic Kidney Disease Epidemiology Collaboration   (CKD-EPI) equation.       GFR, ESTIMATED POCT   Date Value Ref Range Status   05/17/2023 >60 >60 mL/min/1.73m2 Final     Calcium   Date Value Ref Range Status   12/02/2024 8.5 (L) 8.8 - 10.4 mg/dL Final     Comment:     Reference intervals for this test were updated on 7/16/2024 to reflect our healthy population more accurately. There may be differences in the flagging of prior results with similar values performed with this method. Those prior results can be interpreted in the context of the updated reference intervals.   05/21/2021 8.5 8.5 - 10.1 mg/dL Final       Liver Function Studies -   Recent Labs   Lab Test 12/02/24  1046   PROTTOTAL 6.2*   ALBUMIN 3.8   BILITOTAL 0.2   ALKPHOS 87   AST 28   ALT 20       Sed Rate   Date Value Ref Range Status   07/20/2020 27 0 - 30 mm/h Final                   IMPRESSION:  Colonized bladder  Urology work up has been completed    PLAN:  I sent UA/reflex culture at pt request but did tell her I would not be treating any + result and she would then need to d/w urology any additional measures of treatment.  She's young and this problem will worsen to no oral options at all if we keep treating colonization.  CT abdomen dec 2 2024 does not show anything that would be feeding her urine bowel wise, like a fistula.          PATRICK TSANG MD  [facility]  Office [phone]

## 2025-01-04 LAB — BACTERIA UR CULT: ABNORMAL

## 2025-01-06 ENCOUNTER — TELEPHONE (OUTPATIENT)
Dept: PALLIATIVE MEDICINE | Facility: OTHER | Age: 60
End: 2025-01-06
Payer: MEDICARE

## 2025-01-06 DIAGNOSIS — M54.12 CERVICAL RADICULOPATHY: Primary | ICD-10-CM

## 2025-01-06 NOTE — TELEPHONE ENCOUNTER
Procedure order placed:  Cervical ILESI(order appears as translaminar)    Unable to find scheduling template questions for this injection.

## 2025-01-14 ENCOUNTER — OFFICE VISIT (OUTPATIENT)
Dept: PALLIATIVE MEDICINE | Facility: OTHER | Age: 60
End: 2025-01-14
Attending: NURSE PRACTITIONER
Payer: MEDICARE

## 2025-01-14 VITALS
DIASTOLIC BLOOD PRESSURE: 57 MMHG | OXYGEN SATURATION: 97 % | HEART RATE: 100 BPM | SYSTOLIC BLOOD PRESSURE: 113 MMHG | WEIGHT: 156 LBS | BODY MASS INDEX: 23.72 KG/M2

## 2025-01-14 DIAGNOSIS — M19.011 ARTHRITIS OF RIGHT SHOULDER REGION: ICD-10-CM

## 2025-01-14 DIAGNOSIS — M47.817 LUMBOSACRAL SPONDYLOSIS WITHOUT MYELOPATHY: Primary | ICD-10-CM

## 2025-01-14 PROCEDURE — 99214 OFFICE O/P EST MOD 30 MIN: CPT | Performed by: STUDENT IN AN ORGANIZED HEALTH CARE EDUCATION/TRAINING PROGRAM

## 2025-01-14 PROCEDURE — G0463 HOSPITAL OUTPT CLINIC VISIT: HCPCS | Performed by: STUDENT IN AN ORGANIZED HEALTH CARE EDUCATION/TRAINING PROGRAM

## 2025-01-14 ASSESSMENT — PAIN SCALES - GENERAL: PAINLEVEL_OUTOF10: SEVERE PAIN (7)

## 2025-01-14 NOTE — PATIENT INSTRUCTIONS
Procedures: Bilateral L3-5 MBB/RFA process ordered  Physical Therapy: continue HEP  Diagnostic Studies: MRI lumbar spine reviewed  Medication Management: per Desi  Follow up: for procedure(s)    Remberto Gallegos MD  Interventional Pain Medicine  [facility]    [facility] Number:  [phone]  Call with any questions about your care and for scheduling assistance.   Calls are returned Monday through Friday between 8 AM and 4:30 PM. We usually get back to you within 2 business days depending on the issue/request.    If we are prescribing your medications:  For opioid medication refills, call the clinic or send a [employer] message 7 days in advance.  Please include:  Name of requested medication  Name of the pharmacy.  For non-opioid medications, call your pharmacy directly to request a refill. Please allow 3-4 days to be processed.   Per [place] State Law:  All controlled substance prescriptions must be filled within 30 days of being written.    For those controlled substances allowing refills, pickup must occur within 30 days of last fill.      We believe regular attendance is key to your success in our program!    Any time you are unable to keep your appointment we ask that you call us at least 24 hours in advance to cancel.This will allow us to offer the appointment time to another patient.   Multiple missed appointments may lead to dismissal from the clinic.   [facility] [place]    Clinic Number:  [phone]  Call with any questions about your care and for scheduling assistance.   Calls are returned Monday through Friday between 8 AM and 4:30 PM. We usually get back to you within 2 business days depending on the issue/request.    If we are prescribing your medications:  For opioid medication refills, call the clinic or send a [employer] message 7 days in advance.  Please include:  Name of requested medication  Name  of the pharmacy.  For non-opioid medications, call your pharmacy directly to request a refill. Please allow 3-4 days to be processed.   Per [place] State Law:  All controlled substance prescriptions must be filled within 30 days of being written.    For those controlled substances allowing refills, pickup must occur within 30 days of last fill.      We believe regular attendance is key to your success in our program!    Any time you are unable to keep your appointment we ask that you call us at least 24 hours in advance to cancel.This will allow us to offer the appointment time to another patient.   Multiple missed appointments may lead to dismissal from the clinic.

## 2025-01-14 NOTE — PROGRESS NOTES
"[facility] Interventional Evaluation    Date of visit: 1/14/2025    Reason for consultation:    Nathalie Bashir is a 59 year old female who is seen in consultation today for evaluation of an interventional strategy for pain management.    PCP is Alban Yuan.    Please see the [facility] [place] health questionnaire which the patient completed and reviewed with me in detail.    Chief Complaint:    Chief Complaint   Patient presents with    Pain    Pain Management     Low back        Pain history:  Nathalie Bashir is a 59 year old female presenting with a chief pain complaint of low back pain. Patient has neck and shoulder pain previously evaluated by Desi Albright NP.    Onset: on and off for 1.5 years, no injury  Location and Radiation: bilateral low back L>R, Radiates into posterior thighs, lateral calves, stops at ankles  Provoking: standing  Palliating: laying on back or side  Quality: burning, \"annoying\"  Severity: 3-10/10  Timing: constant  Numbness: in bilateral knees down and elbows to hands   Weakness: occasionally    Patient denies red flag symptoms of corresponding bowel/bladder symptoms, fever/chills, saddle anesthesia, profound motor loss, weight loss, or sudden unremitting increase in pain.    Current pain medications include:  Oxycodone  Flexeril  amitriptyline    Other treatments have included:  Nathalie Bashir has been seen at a pain clinic in the past.  Sees Desi Albright  PT: has tried, does HEP now  Injections:    12/11/24 - Right GH injection - helpful   7/10/24 - C-ILESI - helpful   1/18/24 - R GH injection - H  Surgery:    2016 L JOSE   2012 laminectomy facetectomy in history   2011 left SI joint fusion    Past Medical History:  Past Medical History:   Diagnosis Date    Allergic rhinitis     Cancer (H)     stomach cancer    Chronic pain     Closed fracture of distal end of left radius     Depression     Gastric " adenocarcinoma (H)     Lumbago     Migraine     Opioid dependence (H)     Other chronic pain     low back    Sacroiliitis     low back pain    Trochanteric bursitis     leg length discrrepancy, hip pain     Patient Active Problem List    Diagnosis Date Noted    Lumbar facet arthropathy 09/12/2023     Priority: Medium    Lumbar spondylosis 09/12/2023     Priority: Medium    H/O Whipple procedure 01/03/2023     Priority: Medium    Other closed intra-articular fracture of distal end of left radius, initial encounter 09/19/2022     Priority: Medium     Added automatically from request for surgery [id number]      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) 08/24/2022     Priority: Medium    Pyelonephritis 08/23/2022     Priority: Medium    Chemotherapy-induced neuropathy 04/04/2022     Priority: Medium    Bilateral hand pain 04/04/2022     Priority: Medium    History of gastric surgery 01/08/2021     Priority: Medium     Added automatically from request for surgery [id number]      Dysphagia 11/23/2020     Priority: Medium     Added automatically from request for surgery [id number]      Nausea & vomiting 11/05/2020     Priority: Medium    Paralytic ileus (H) 10/26/2020     Priority: Medium    Hypokalemia 10/26/2020     Priority: Medium    Anemia 10/23/2020     Priority: Medium    Iron deficiency anemia 10/08/2020     Priority: Medium    Malignant neoplasm of overlapping sites of stomach (H) 09/08/2020     Priority: Medium     Added automatically from request for surgery [id number]      Abdominal pain 08/23/2020     Priority: Medium    Colitis 08/21/2020     Priority: Medium    Jejunitis 08/13/2020     Priority: Medium     Added automatically from request for surgery [id number]      Thrombocytopenia 07/21/2020     Priority: Medium    Urinary tract infection 07/21/2020     Priority: Medium    Crohn's disease of small intestine (H) 07/21/2020     Priority: Medium    Cholangitis (H) 07/20/2020     Priority: Medium     Gastric cancer (H) 07/17/2020     Priority: Medium     Added automatically from request for surgery [id number]      Chemotherapy-induced neutropenia 06/29/2020     Priority: Medium    Mucositis 06/15/2020     Priority: Medium    Pneumonia 05/21/2020     Priority: Medium    Gastric adenocarcinoma (H) 02/19/2020     Priority: Medium    Intra-abdominal fluid collection 02/10/2020     Priority: Medium    Bacteremia due to Gram-negative bacteria 02/10/2020     Priority: Medium    Malnutrition 02/10/2020     Priority: Medium    Leukocytosis 02/04/2020     Priority: Medium    Low urine output 01/30/2020     Priority: Medium    Hypophosphatemia 01/30/2020     Priority: Medium    Long term (current) use of opiate analgesic 01/17/2020     Priority: Medium    Bursitis 01/15/2020     Priority: Medium     Created by Conversion      Depression 01/15/2020     Priority: Medium     Created by Conversion      Sacroiliitis 01/15/2020     Priority: Medium     Created by Conversion      IPMN (intraductal papillary mucinous neoplasm) 01/03/2020     Priority: Medium     Added automatically from request for surgery [id number]      Chronic neck pain 08/30/2018     Priority: Medium    Migraine with aura 06/21/2018     Priority: Medium     Created by Conversion    Replacement Utility updated for latest IMO load      Unilateral inguinal hernia without obstruction or gangrene 04/05/2018     Priority: Medium    Chronic bilateral low back pain without sciatica 08/18/2017     Priority: Medium    Cervical radiculopathy, chronic 08/18/2017     Priority: Medium    Controlled substance agreement signed 06/29/2017     Priority: Medium     Dr. Pinzon   [facility]   [facility],.............[place] 6/29/2017 4:20 PM  Dr. Pinzon   [facility] in [facility] [place],.............[place] 6/29/2017 4:20 PM      Infected prosthesis of left hip 10/08/2016     Priority: Medium    Status post left hip replacement 10/08/2016     Priority: Medium     Screening for malignant neoplasm of cervix 09/07/2016     Priority: Medium     Per visit note dated August 31 , 2016: Hx of abnormal paps: no  [phone] NILM  2016 NILM, hpv negative 50 y.o.  PLAN: Cotesting 8/2021    ; pap test history      Chronic pain disorder 08/24/2016     Priority: Medium     Overview:   Overview:   Created by Conversion  Created by Conversion      Continuous opioid dependence (H) 08/24/2016     Priority: Medium     Overview:   Overview:   Created by Conversion  Created by Conversion      Degeneration of intervertebral disc of lumbosacral region 08/24/2016     Priority: Medium     Overview:   Overview:   Created by Conversion  Created by Conversion      Spondylosis of lumbosacral spine without myelopathy 08/24/2016     Priority: Medium     Overview:   Overview:   Created by Conversion  Created by Conversion      Arthralgia of hip 10/16/2015     Priority: Medium    Greater trochanteric bursitis of left hip 10/16/2015     Priority: Medium    Lower limb length difference 12/23/2014     Priority: Medium    Debility 12/31/2008     Priority: Medium     Overview:   Epic       MDD (major depressive disorder), recurrent, in partial remission 11/26/2008     Priority: Medium    Disorder of sacrum 04/16/2008     Priority: Medium     Overview:   Epic       Allergic rhinitis 12/13/2006     Priority: Medium       Past Surgical History:  Past Surgical History:   Procedure Laterality Date    ARTHROPLASTY HIP Left 2016    BACK SURGERY      L4-5 decompression    CATARACT IOL, RT/LT      CHOLECYSTECTOMY N/A 1/28/2020    Procedure: Cholecystectomy;  Surgeon: Yandel Mallory MD;  Location: [facility] OR    ENDOSCOPIC RETROGRADE CHOLANGIOPANCREATOGRAM N/A 7/27/2020    Procedure: ENDOSCOPIC RETROGRADE CHOLANGIOPANCREATOGRAPHY  **Latex Allergy** with jejunal biopsies;  Surgeon: Jeet Aviles MD;  Location: [facility] OR    ESOPHAGOSCOPY, GASTROSCOPY, DUODENOSCOPY (EGD), COMBINED N/A 3/3/2020    Procedure:  ESOPHAGOGASTRODUODENOSCOPY, WITH ENDOSCOPIC US (enoxaparin);  Surgeon: Bakari Plata MD;  Location: [facility] GI    ESOPHAGOSCOPY, GASTROSCOPY, DUODENOSCOPY (EGD), COMBINED N/A 12/7/2020    Procedure: Upper Endoscopy with stent placement;  Surgeon: Jeet Aviles MD;  Location:  OR    ESOPHAGOSCOPY, GASTROSCOPY, DUODENOSCOPY (EGD), COMBINED N/A 2/8/2021    Procedure: ESOPHAGOGASTRODUODENOSCOPY (EGD)AND STENT REMOVAL;  Surgeon: Jeet Aviles MD;  Location:  OR    EYE SURGERY      GASTRECTOMY N/A 10/21/2020    Procedure: Open subtotal gastectomy with David en Y Reconstruction; D1 Lymph Node Disection  **Latex Allergy**;  Surgeon: Yandel Mallory MD;  Location: [facility] OR    INJECT BLOCK MEDIAL BRANCH CERVICAL/THORACIC/LUMBAR Bilateral 9/27/2023    Procedure: BILATERAL L4-5, L5-S1 medial branch block #1;  Surgeon: Maria Victoria Urbina MD;  Location: [facility] OR    IR CHEST PORT PLACEMENT > 5 YRS OF AGE  3/26/2020    IR LUMBAR EPIDURAL STEROID INJECTION  8/25/2009    IR LUMBAR EPIDURAL STEROID INJECTION  9/2/2009    IR LUMBAR EPIDURAL STEROID INJECTION  9/25/2009    IR LUMBAR EPIDURAL STEROID INJECTION  11/23/2009    IR LUMBAR EPIDURAL STEROID INJECTION  4/1/2010    IR LUMBAR EPIDURAL STEROID INJECTION  9/1/2010    IR LUMBAR EPIDURAL STEROID INJECTION  3/10/2011    IR LUMBAR EPIDURAL STEROID INJECTION  8/30/2011    IR PICC PLACEMENT > 5 YRS OF AGE  10/12/2016    IR PORT REMOVAL RIGHT  12/8/2023    LAPAROSCOPY DIAGNOSTIC (GENERAL) N/A 10/13/2020    Procedure: Diagnostic laparoscopy with peritoneal washings  **Latex Allergy**;  Surgeon: Yandel Mallory MD;  Location: [facility] OR    OPEN REDUCTION INTERNAL FIXATION RODDING INTRAMEDULLARY FEMUR Left 12/23/2014    Procedure: OPEN REDUCTION INTERNAL FIXATION RODDING INTRAMEDULLARY FEMUR;  Surgeon: Arnol Santiago MD;  Location: [facility] OR    OPEN REDUCTION INTERNAL FIXATION WRIST Left 9/22/2022    Procedure: LEFT WRIST OPEN REDUCTION INTERNAL FIXATION, FRACTURE;  Surgeon: Wilfredo  MD Mark;  Location: [facility] OR    ORTHOPEDIC SURGERY      PICC DOUBLE LUMEN PLACEMENT Right 02/07/2020    5 fr double lumen 41 cm    AL LAMINEC/FACETECT/FORAMIN,LUMBAR 1 SEG      Description: Laminectomy Decompress, Facetectomy, Foraminotomy Lumbar Seg;  Recorded: 04/12/2012;    REMOVE HARDWARE LOWER EXTREMITY Left 4/7/2015    Procedure: REMOVE HARDWARE LOWER EXTREMITY;  Surgeon: Arnol Santiago MD;  [facility] OR    REMOVE HARDWARE RODDING INTRAMEDULLARY FEMUR Left 12/17/2015    Procedure: REMOVE HARDWARE RODDING INTRAMEDULLARY FEMUR;  Surgeon: Arnol Santiago MD;  [facility]    RESECT BONE LOWER EXTREMITY Left 12/23/2014    Procedure: RESECT BONE LOWER EXTREMITY;  Surgeon: Arnol Santiago MD;  [facility]    SHORTENING OSTEOPLASTY FEMUR W/ IM NAILING      WHIPPLE PROCEDURE N/A 1/28/2020    Procedure: Open Whipple procedure and Cholecystectomy;  Surgeon: Yandel Mallory MD;  Location: [facility] OR    WHIPPLE PROCEDURE      ZZC FUSION OF SACROILIAC JOINT      Description: Arthrodesis Sacroiliac Joint Left;  Recorded: 10/07/2011;    ZZC REPAIR DETACH RETINA,SCLERAL BUCKLE       Medications:  Current Outpatient Medications   Medication Sig Dispense Refill    amitriptyline (ELAVIL) 75 MG tablet Take 1 tablet (75 mg) by mouth at bedtime 30 tablet 3    citalopram (CELEXA) 20 MG tablet Take 1.5 tablets (30 mg) by mouth daily 135 tablet 1    cyclobenzaprine (FLEXERIL) 10 MG tablet TAKE 1 TABLET BY MOUTH 3 TIMES DAILY AS NEEDED FOR MUSCLE SPASMS 90 tablet 3    famotidine (PEPCID) 20 MG tablet TAKE 1 TABLET (20 MG) BY MOUTH 2 TIMES DAILY AS NEEDED 180 tablet 1    fluticasone (FLONASE) 50 MCG/ACT nasal spray Spray 1 spray into both nostrils daily at 2 pm      ondansetron (ZOFRAN) 8 MG tablet Take 1 tablet (8 mg) by mouth every 8 hours as needed (Nausea/Vomiting) 90 tablet 2    oxyCODONE (ROXICODONE) 5 MG tablet Take 1 tablet (5 mg) by mouth 3 times daily as needed for severe pain. 90 tablet 0    pantoprazole  (PROTONIX) 40 MG EC tablet TAKE 1 TABLET BY MOUTH 2 TIMES DAILY 180 tablet 1    sennosides (SENOKOT) 8.6 MG tablet 1-2 tablets 1-2 times daily as needed. 120 tablet 1    simethicone 80 MG TABS Take 1 tablet by mouth every 6 hours as needed (bloating, gas, belching) 90 tablet 3    SUMAtriptan (IMITREX) 100 MG tablet Take 100 mg by mouth at onset of headache for migraine       Allergies:     Allergies   Allergen Reactions    Gluten Meal Palpitations    Amoxicillin-Pot Clavulanate Rash     Patient tolerated Zosyn in 7/2020    Oxycontin [Oxycodone] Other (See Comments)     Confusion, loopy, oxycodone is tolerated.    Pt states she tolerates regular oxycodone, cannot take 12 hour oxycontin.      Pregabalin      interfered with Cymbalta    Topiramate      Tongue numbness, taste alteration, irritable     Acetaminophen Nausea     Urine retention      Dust Mite Extract     Gabapentin Dizziness and GI Disturbance    Ketorolac Headache    Latex Rash    Methadone Fatigue    Mold     Naprosyn [Naproxen] Dizziness and GI Disturbance    Seasonal Allergies          Family history:  Family History   Problem Relation Age of Onset    Heart Failure Mother 77    Dementia Father         frontal lobe    No Known Problems Sister     No Known Problems Brother     Anesthesia Reaction No family hx of     Deep Vein Thrombosis No family hx of        Physical Exam:  Vitals:    01/14/25 1256   BP: 113/57   Pulse: 100   SpO2: 97%   Weight: 70.8 kg (156 lb)     Exam:  Constitutional: Well developed, NAD  Head: normocephalic. Atraumatic.   Eyes: no redness or jaundice noted   CV: warm, well perfused extremities   Skin: no suspicious lesions or rashes   Psychiatric: mentation appears normal and affect full    Neuromuscular Examination:  Normal ROM in lumbar flexion, extension, pain with extension  +TTP to bilateral lumbar paraspinals  Negative SI joint tenderness bilaterally  Normal 5/5 strength in BLE except 4+/5 in left HF  Globally reduced  sensation to light touch in the L3-S1 distributions bilaterally   No ankle clonus bilaterally    Diagnostic tests:  EXAM: MR LUMBAR SPINE W/O CONTRAST  LOCATION: [facility]  DATE: 9/3/2023     INDICATION: History of lumbar lami (+10yrs), primarily axial LBP>BLE radicular pain.  COMPARISON: Plain film imaging 08/29/2023 of the lumbar spine, prior body CT 08/30/2023 also reviewed.  TECHNIQUE: Routine Lumbar Spine MRI without IV contrast.     FINDINGS:   Nomenclature is based on 5 lumbar type vertebral bodies. Rudimentary 12th ribs are identified. Partial sacralization left L5. Correlate with plain film imaging if there is future plan for intervention. Satisfactory lumbar vertebral body height. 2 mm   degenerative anterior subluxation L3-L4 as on prior body CT. No high-grade subluxations. No marrow or ligamentous edema. Nothing for sacral insufficiency or stress fracture. Nothing for a marrow-infiltrative process. Benign vertebral body hemangioma at   L1. Interspace narrowing lower thoracic level and at L3-L4. Congenital narrowing of the L5-S1 interspace. Left SI joint space fusion hardware with degenerative changes right SI joint. Sacral neural foramina are intact. Normal distal spinal cord and cauda   equina with conus medullaris at L1. Rightward lumbar curve. Nerve roots of the cauda equina are satisfactory without nodularity thickening. No retroperitoneal solid mass or adenopathy. Symmetric psoas appearance. Normal caliber abdominal aorta.   Satisfactory renal contours.     T12-L1: Moderate loss of disc height with annular bulge. No disc herniation, spinal stenosis or foraminal narrowing is present. Facet joints are satisfactory.      L1-L2: Normal disc height and signal. Mild annular bulge. No herniation. Normal facets. No spinal canal or neural foraminal stenosis.     L2-L3: Mild loss of disc height with annular bulge. No disc herniation. No spinal stenosis. Mild foraminal narrowing  bilaterally. Mild facet joint hypertrophic changes are present.      L3-L4: Mild loss of disc height. Low-grade degenerative anterior subluxation. Generalized disc bulge. No disc herniation. Mild foraminal narrowing inferiorly. No spinal stenosis. Mild facet joint arthropathy bilaterally.     L4-L5: Normal disc height and signal. Annular bulge. No herniation. Normal facets. No spinal canal or right neural foraminal stenosis. Mild left foraminal narrowing.     L5-S1: Partial sacralization on the left. Interspace narrowing on a congenital/developmental basis with disc desiccation. No disc herniation. No spinal stenosis or foraminal narrowing.                                                                      IMPRESSION:  1.  See above for counting nomenclature, stable from prior body CT examination 08/30/2023 with partial sacralization left L5. Correlate with plain film imaging if there is future plan for intervention.     2.  Low-grade degenerative anterior subluxation L3 upon L4. No high-grade subluxations. No marrow or ligamentous edema.     3.  No evidence for severe spinal stenosis or severe foraminal compromise at any lumbar level.     4.  See above for additional details and full level by level description.    Personally reviewed imaging: yes      Assessment:  Lumbar spondylosis  Lumbar radiculopathy    Nathalie Bashir is a 59 year old female who presents with chronic bilateral low back pain, worse on the left, present for 1.5 years without any traumatic incident.  Patient notes the pain is worse with standing, walking.  It is improved with laying down.  The pain can radiate into bilateral posterior thighs, lateral calf, stopping at the ankles.  There is associated numbness from a pre-existing peripheral neuropathy in the bilateral lower legs and arms.  She has tried physical therapy and medications for this.  She has had 1 round of MBB's 2 years ago, and believes it was helpful.  However, given the life  stressors of last months of life of her father-in-law, she was otherwise distracted and was unable to complete this process.  I believe that, based on an exam that shows tenderness to palpation at lower lumbar paraspinals, a lumbar spondylosis with resultant facet arthropathy is the primary pain generator.  We will proceed interventionally with bilateral L3-5 MBB/RFA process.  We also will treat this patient for cervical radiculopathy and right shoulder arthropathy, and interventional management has been quite successful in this, so we will continue as needed.    Plan:  Diagnosis reviewed, treatment option addressed, and risk/benefits discussed.     Procedures:   Bilateral L3-5 MBB/RFA process ordered  Consider lumbar MORGAN in future  Physical Therapy: continue HEP  Diagnostic Studies: MRI lumbar spine reviewed  Medication Management: per Desi  Follow up: for procedure(s)    Remberto Gallegos MD  Interventional Pain Medicine  [facility]

## 2025-01-15 ENCOUNTER — RADIOLOGY INJECTION OFFICE VISIT (OUTPATIENT)
Dept: PALLIATIVE MEDICINE | Facility: OTHER | Age: 60
End: 2025-01-15
Attending: NURSE PRACTITIONER
Payer: MEDICARE

## 2025-01-15 ENCOUNTER — TELEPHONE (OUTPATIENT)
Dept: PALLIATIVE MEDICINE | Facility: OTHER | Age: 60
End: 2025-01-15
Payer: MEDICARE

## 2025-01-15 VITALS — OXYGEN SATURATION: 94 % | SYSTOLIC BLOOD PRESSURE: 101 MMHG | DIASTOLIC BLOOD PRESSURE: 59 MMHG | HEART RATE: 87 BPM

## 2025-01-15 DIAGNOSIS — M54.12 CERVICAL RADICULOPATHY: ICD-10-CM

## 2025-01-15 PROCEDURE — 62321 NJX INTERLAMINAR CRV/THRC: CPT | Performed by: STUDENT IN AN ORGANIZED HEALTH CARE EDUCATION/TRAINING PROGRAM

## 2025-01-15 PROCEDURE — 250N000011 HC RX IP 250 OP 636: Performed by: STUDENT IN AN ORGANIZED HEALTH CARE EDUCATION/TRAINING PROGRAM

## 2025-01-15 PROCEDURE — 255N000002 HC RX 255 OP 636: Performed by: STUDENT IN AN ORGANIZED HEALTH CARE EDUCATION/TRAINING PROGRAM

## 2025-01-15 RX ORDER — DEXAMETHASONE SODIUM PHOSPHATE 10 MG/ML
10 INJECTION INTRAMUSCULAR; INTRAVENOUS ONCE
Status: COMPLETED | OUTPATIENT
Start: 2025-01-15 | End: 2025-01-15

## 2025-01-15 RX ADMIN — DEXAMETHASONE SODIUM PHOSPHATE 10 MG: 10 INJECTION, SOLUTION INTRAMUSCULAR; INTRAVENOUS at 15:10

## 2025-01-15 RX ADMIN — IOHEXOL 10 ML: 180 INJECTION INTRAVENOUS at 15:08

## 2025-01-15 ASSESSMENT — PAIN SCALES - GENERAL: PAINLEVEL_OUTOF10: MILD PAIN (3)

## 2025-01-15 NOTE — PROGRESS NOTES
Pre-procedure Intake    If YES to any questions or NO to having a     Notify nurses and provider    Are you taking any prescribed blood thinners such as Coumadin, Warfarin, Jantoven, Pradaxa Xarelto, Eliquis, Edoxaban, Enoxaparin, Lovenox, Heparin, Arixtra, Fondaparinux, or Fragmin? OR Antiplatelet medication such as Plavix, Brilinta, or Effient?   No   If yes, when did you take your last dose?     Do you take aspirin or use aspirin products?  No  If cervical procedure or interlaminar, have you held aspirin for 6 days?   NA    Do you have any allergies to contrast dye, iodine, steroid and/or numbing medications?  NO    Are you currently taking antibiotics or have an active infection?  NO    Have you had a fever/elevated temperature within the past week? NO    Have you had a vaccination of any kind in the last seven days or a Covid vaccine in the last two weeks NO    Do you have a ? Yes    Are you pregnant or breastfeeding?  NO      Notify provider and RNs if systolic BP >170, diastolic BP >100, P >100 or O2 sats < 90%

## 2025-01-15 NOTE — PATIENT INSTRUCTIONS
[facility] [place]   Procedure Discharge Instructions    Today you saw:    Dr. Gallegos    You had an:  Cervical Epidural Steroid Injection       Medications used:  Lidocaine    Dexamethasone Omnipaque   Normal saline       If you were holding your blood thinning medication, please restart taking it: N/A  Be cautious when walking. Numbness and/or weakness in the lower extremities may occur for up to 6-8 hours after the procedure due to effect of the local anesthetic  Do not drive for 6 hours. The effect of the local anesthetic could slow your reflexes.   You may resume your regular activities after 24 hours  Avoid strenuous activity for the first 24 hours  You may shower, however avoid swimming, tub baths or hot tubs for 24 hours following your procedure  You may have a mild to moderate increase in pain for several days following the injection.  It may take up to 14 days for the steroid medication to start working although you may feel the effect as early as a few days after the procedure.     You may use ice packs for 10-15 minutes, 3 to 4 times a day at the injection site for comfort  Do not use heat to painful areas for 6 to 8 hours. This will give the local anesthetic time to wear off and prevent you from accidentally burning your skin.   Unless you have been directed to avoid the use of anti-inflammatory medications (NSAIDS), you may use medications such as ibuprofen, Aleve or Tylenol for pain control if needed.   If you were fasting, you may resume your normal diet and medications after the procedure  If you have diabetes, check your blood sugar more frequently than usual as your blood sugar may be higher than normal for 10-14 days following a steroid injection. Contact your doctor who manages your diabetes if your blood sugar is higher than usual  Possible side effects of steroids that you may experience include flushing, elevated blood pressure, increased appetite, mild  headaches and restlessness.  All of these symptoms will get better with time.  If you experience any of the following, call the Pain Clinic at [phone]:  -Fever over 100 degree F  -Swelling, bleeding, redness, drainage, warmth at the injection site  -Progressive weakness or numbness in your legs or arms  -Loss of bowel or bladder function  -Unusual headache that is not relieved by Tylenol or other pain reliever  -Unusual new onset of pain that is not improving

## 2025-01-15 NOTE — PROGRESS NOTES
Pre procedure Diagnosis: Cervical Radiculopathy   Post procedure Diagnosis: Same  Procedure performed: C7-T1 interlaminar epidural steroid injection, CPT code 10409  Anesthesia: none  Complications: none immediately noted  Operators: Remberto Gallegos MD; Radha Unger DO    Indications:   Nathalie sanchez a 59 year old female was sent by Desi Albright APRN for a cervical epidural steroid injection. During the consent process, this procedure was determined to be an appropriate intervention for the patient's symptoms.    Options/alternatives, benefits and risks were discussed with the patient including bleeding, infection, no pain relief, tissue trauma, exposure to radiation, reaction to medications, spinal cord injury, dural puncture, weakness, numbness and headache.  Questions were answered to her satisfaction and she agrees to proceed. Voluntary informed consent was obtained and signed.     Vitals were reviewed: Yes  Allergies were reviewed:  Yes   Medications were reviewed:  Yes   Pre-procedure pain score: 7/10    Procedure:  After getting informed consent, patient was brought into the procedure suite and was placed in a prone position on the procedure table.   A procedural pause was performed.  Patient was prepped and draped in sterile fashion.     The C7-T1 interspace was identified with AP fluoroscopy.  A total of 2 ml of 1% lidocaine was used to anesthetize the skin for a right paramedian approach.  A 22gauge 3.5 inch Touhy needle was advanced under intermittent fluoroscopy.  A TU syringe was used to advance the needle into the epidural space.   After loss of resistance, there was no evidence of blood or CSF on aspiration. Location was verified with both AP and either contralateral oblique or lateral fluoroscopic imaging.    A total of 1 ml of Omnipaque 180 was injected demonstrating appropriate epidural spread and was checked in both the AP and lateral views. There was no evidence of  intravascular or intrathecal spread.    A mixture of 10mg of dexamethasone and 2ml of preservative free saline was injected. The needle was removed from the epidural space and fully removed from the skin.     Hemostasis was achieved, the area was cleaned, and bandaids were placed when appropriate.  The patient tolerated the procedure well, and was taken to the recovery room.    Images were saved to PACS.    Post-procedure pain score: 3/10    Follow-up includes:   - F/u with referring provider    Radha Unger DO  Pain medicine Fellow      Physician Attestation   I, Remberto Gallegos MD, was present for all key and critical portions of the procedure, and I was immediately available during the remainder of the procedure.  Any changes to the documentation have been made above.    Remberto Gallegos MD  Interventional Pain Medicine  [facility]

## 2025-01-15 NOTE — TELEPHONE ENCOUNTER
Screening questions for MBB Injections:    Injection to be done at which interventional clinic site? [facility]    Procedure ordered by Dr. Gallegos    Procedure ordered? Lumbar Medial Branch Block    What insurance would patient like us to bill for this procedure? Medicare    MEDICA: REQUIRES A PA FOR BOTH MBB   Worker's comp- Any injection DO NOT SCHEDULE and route to Gee Stoner.    [employer] insurance - ANY INJECTION, DO NOT SCHEDULE and route to Luz Abbott.     MBBs must be scheduled with elapsed time interval of at least 2 weeks and not more than 6 months between the First MBB and the Second MBB for insurance purposes     [employer] - Any injection besides hip/shoulder/knee joint DO NOT SCHEDULE and route to Luz Abbott. She will obtain PA and call pt back to schedule procedure or notify pt of denial.     [employer]- PA required for all MBB's    **BCBS- ALL need to be routed to Luz for review if a PA is needed**  IF SCHEDULING IN [facility] PLEASE SCHEDULE AT LEAST 7-10         BUSINESS DAYS OUT SO A PA CAN BE OBTAINED    Genicular Nerve blocks- ALL insurances except for- [employer], Medicare (straight not supplement) and [employer]. Need to be reviewed by Luz before scheduling.       Is patient scheduled at [facility]? no   If YES, route every encounter to [facility] CARE NAVIGATION POOL [[phone]]    Any chance of pregnancy? NO   If YES, do NOT schedule and route to RN pool  - Dr. Urbina route to Dorene Springer and PM&R Nurse  [[id number]]      Is an  needed? No     Patient has a drive home? (mandatory) Yes     Is patient taking any blood thinners (plavix, coumadin, jantoven, warfarin, heparin, pradaxa or dabigatran )? No    If hold needed, do NOT schedule, route to RN pool/ Dr. Urbina's Team     Does the patient have a bleeding or clotting disorder? No  If YES, okay to schedule AND route to RN nurse rebekah/ Dr. Urbina's Team  **For any patients with  platelet count <100, must be forwarded to provider**    Is patient diabetic? no If YES, have them bring their glucometer.    Does patient have an active infection or treated for one within the past week? No    Is patient currently taking any antibiotics?  No  For patients on chronic, preventative, or prophylactic antibiotics, procedures may be scheduled.   For patients on antibiotics for active or recent infection:antibiotic course must have been completed for 4 days    Is patient currently taking any steroid medications? (i.e. Prednisone, Medrol)  No   For patients on steroid medications, course must have been completed for 4 days    Is patient actively being treated for cancer or immunocompromised? No   If YES, do NOT schedule and route to RN/ Dr. Urbina's Team    Are you able to get on and off an exam table with minimal or no assistance? Yes   If NO, do NOT schedule and route to RN/ Dr. Urbina's Team    Are you able to roll over and lay on your stomach with minimal or no assistance? Yes   If NO, do NOT schedule and route to RN/ Dr. Urbina's Team    Any allergies to contrast dye, iodine, shellfish, or numbing and steroid medications? No  (If so, inform nursing and note in scheduling comments.)    Allergies: Gluten meal, Amoxicillin-pot clavulanate, Oxycontin [oxycodone], Pregabalin, Topiramate, Acetaminophen, Dust mite extract, Gabapentin, Ketorolac, Latex, Methadone, Mold, Naprosyn [naproxen], and Seasonal allergies     Does patient have an MRI/CT?  Not Applicable  Check Procedure Scheduling Grid to see if required.    Was the MRI done within the last 3 years?  NA  If yes, where was the MRI done i.e.[facility], [facility], [place], [facility] etc?     If no, do not schedule and route to nursing/ Dr. Urbina's Team  If MRI was not done at [place], [facility] or [facility] do NOT schedule and route to nursing.    If pt has an imaging disc, the injection MAY be scheduled but pt has to bring disc to appt.   If  they show up without the disc the injection cannot be done    Is patient able to transfer to a procedure table with minimal or no assistance? Yes  If NO, do NOT schedule and route to RN/ Dr. Urbina's Team    Medial Branch Block Pre-Procedure Instructions  It is okay to take long acting pain medications (if you are on them) the day of the procedure but try not to take any short acting medications unless absolutely necessary.    YES: ok   Long acting meds would include: Gabapentin (Neurontin), MS Contin, Oxycontin        Short acting meds would include:  Percocet, Oxycodone, Vicodin, Ibuprofen   The day of the procedure, you should try to do things that provoke your pain, since the injection is being done to see if it will relieve your pain . YES: ok   If your pain level is a 4 out of 10 or less on the day of the procedu re, please call [phone] to reschedule.  YES: ok     Reminders:    If you are started on any steroids or antibiotics between now and your appointment, you must contact us because it may affect our ability to perform your procedure ok    Instructed pt to arrive 30 minutes early for IV start if required. (Check Procedure Scheduling Grid) BLANCA     If this is for a cervical MBB aspirin needs to be held for 6 days.  NO     Do not schedule procedures requiring IV placement in the first appointment of the day or first appointment after lunch. Do NOT schedule at 0745, 0815 or 1245.  ok    For patients 85 or older we recommend having an adult stay w/ them for the remainder of the day.      Does the patient have any questions? no

## 2025-01-15 NOTE — NURSING NOTE
Discharge Information    IV Discontiued Time:  NA    Amount of Fluid Infused:  NA    Discharge Criteria = When patient returns to baseline or as per MD order    Consciousness:  Pt is fully awake    Circulation:  BP +/- 20% of pre-procedure level    Respiration:  Patient is able to breathe deeply    O2 Sat:  Patient is able to maintain O2 Sat >92% on room air    Activity:  Moves 4 extremities on command    Ambulation:  Patient is able to stand and walk or stand and pivot into wheelchair    Dressing:  Clean/dry or No Dressing    Notes:   Discharge instructions and AVS given to patient    Patient meets criteria for discharge?  YES    Admitted to PCU?  No    Responsible adult present to accompany patient home?  Yes    Signature/Title:    Marii Elam RN  RN Care Coordinator  [facility] [place]

## 2025-01-18 DIAGNOSIS — M79.2 NEUROPATHIC PAIN: ICD-10-CM

## 2025-01-19 DIAGNOSIS — R10.13 ABDOMINAL PAIN, EPIGASTRIC: ICD-10-CM

## 2025-01-19 DIAGNOSIS — D49.0 IPMN (INTRADUCTAL PAPILLARY MUCINOUS NEOPLASM): ICD-10-CM

## 2025-01-19 DIAGNOSIS — C16.3 MALIGNANT NEOPLASM OF PYLORIC ANTRUM (H): ICD-10-CM

## 2025-01-20 RX ORDER — AMITRIPTYLINE HYDROCHLORIDE 75 MG/1
75 TABLET ORAL AT BEDTIME
Qty: 30 TABLET | Refills: 3 | Status: SHIPPED | OUTPATIENT
Start: 2025-01-20

## 2025-01-20 RX ORDER — PANTOPRAZOLE SODIUM 40 MG/1
40 TABLET, DELAYED RELEASE ORAL 2 TIMES DAILY
Qty: 180 TABLET | Refills: 1 | Status: SHIPPED | OUTPATIENT
Start: 2025-01-20

## 2025-01-20 NOTE — TELEPHONE ENCOUNTER
Received call from patient  requesting refill(s) for amitriptyline (ELAVIL) 75 MG tablet     Patient last seen on 12/24/24  Next appt scheduled for 03/25/25    E[facility]prescribe to:     [id number] [place] [facility] [place] [address]     Will facilitate refill.      Heather Franklin MA  [facility] [place]

## 2025-01-23 DIAGNOSIS — N39.0 RECURRENT UTI: Primary | ICD-10-CM

## 2025-01-23 RX ORDER — PHENAZOPYRIDINE HYDROCHLORIDE 100 MG/1
100 TABLET, FILM COATED ORAL 3 TIMES DAILY PRN
Qty: 9 TABLET | Refills: 0 | Status: SHIPPED | OUTPATIENT
Start: 2025-01-23

## 2025-01-23 RX ORDER — GRANULES FOR ORAL 3 G/1
3 POWDER ORAL ONCE
Qty: 1 PACKET | Refills: 0 | Status: SHIPPED | OUTPATIENT
Start: 2025-01-23 | End: 2025-01-23

## 2025-01-30 ENCOUNTER — HOSPITAL ENCOUNTER (EMERGENCY)
Facility: HOSPITAL | Age: 60
Discharge: HOME | End: 2025-01-30
Payer: MEDICARE

## 2025-01-30 VITALS
BODY MASS INDEX: 24.55 KG/M2 | HEIGHT: 68 IN | HEART RATE: 100 BPM | TEMPERATURE: 98.7 F | WEIGHT: 162 LBS | OXYGEN SATURATION: 96 % | SYSTOLIC BLOOD PRESSURE: 112 MMHG | DIASTOLIC BLOOD PRESSURE: 70 MMHG | RESPIRATION RATE: 18 BRPM

## 2025-01-30 DIAGNOSIS — R35.0 URINARY FREQUENCY: ICD-10-CM

## 2025-01-30 DIAGNOSIS — M54.50 ACUTE LEFT-SIDED LOW BACK PAIN, UNSPECIFIED WHETHER SCIATICA PRESENT: Primary | ICD-10-CM

## 2025-01-30 LAB
ALBUMIN UR-MCNC: NEGATIVE MG/DL
APPEARANCE UR: CLEAR
BACTERIA #/AREA URNS HPF: ABNORMAL /HPF
BILIRUB UR QL STRIP: NEGATIVE
COLOR UR AUTO: ABNORMAL
GLUCOSE UR STRIP-MCNC: NEGATIVE MG/DL
HGB UR QL STRIP: NEGATIVE
KETONES UR STRIP-MCNC: NEGATIVE MG/DL
LEUKOCYTE ESTERASE UR QL STRIP: ABNORMAL
MUCOUS THREADS #/AREA URNS LPF: PRESENT /LPF
NITRATE UR QL: NEGATIVE
PH UR STRIP: 6.5 [PH] (ref 5–7)
RBC URINE: <1 /HPF
SP GR UR STRIP: 1.01 (ref 1–1.03)
SQUAMOUS EPITHELIAL: 1 /HPF
UROBILINOGEN UR STRIP-MCNC: <2 MG/DL
WBC URINE: 4 /HPF

## 2025-01-30 PROCEDURE — 99283 EMERGENCY DEPT VISIT LOW MDM: CPT

## 2025-01-30 PROCEDURE — 81001 URINALYSIS AUTO W/SCOPE: CPT | Performed by: EMERGENCY MEDICINE

## 2025-01-30 NOTE — ED TRIAGE NOTES
Pt states had an appt on Jan 2 with her ID provider but would not treat for bladder infection.  Pt then went to her pmd who put her on abx but did not work .  Pt states still having frequency and foul smelling urine along with back pain.  Low grad fever 99 today

## 2025-01-31 NOTE — DISCHARGE INSTRUCTIONS
Your urine test today did not show any obvious signs of infection. We will culture this urine and call you if there is growth and you require antibiotics.     In the meantime, continue with your pain management of oxycodone. You can also add some topical therapies including lidocaine patches or Voltaren gel.    Please continue following up with urology in infectious disease for further management of the urinary tract symptoms.     Call your doctor now or seek immediate medical care if:    You have new or worse fever, chills, nausea, or vomiting.     You have new pain in your back just below your rib cage. This is called flank pain.     There is new blood or pus in your urine.

## 2025-01-31 NOTE — ED PROVIDER NOTES
Emergency Department Encounter  [facility] EMERGENCY DEPARTMENT  Nathalie Bashir   AGE: 59 year old SEX: female  YOB: 1965  MRN: [id number]      EVALUATION DATE & TIME: No admission date for patient encounter. ; PCP: Alban Yuan   ED PROVIDER: Padmini Barcenas PA-C    CHIEF COMPLAINT: Urinary Frequency      FINAL IMPRESSION       ICD-10-CM    1. Acute left-sided low back pain, unspecified whether sciatica present  M54.50       2. Urinary frequency  R35.0           MEDICAL DECISION MAKING   59 year old female with a pertinent history of asymptomatic bacteriuria, lumbosacral spondylolysis, chronic pain seen by pain medicine and on chronic opioids, and gastric adenocarcinoma who presents to the ED for evaluation of 1 week of back/flank pain.  No trauma.  Foul-smelling urine and increased urinary frequency started today.  She does follow with urology (MD Agustin) who prescribed her fosfomycin on 01/23 which she completed. Temp of 99  F earlier today.  No vomiting or hematuria.    ED Course as of 01/30/25 2330   Thu Jan 30, 2025   1901 I met and introduced myself to the patient. I gathered initial history and performed my physical exam.  Vitals reviewed and hemodynamically stable, afebrile.  On exam, patient is nontoxic-appearing.  There is no abdominal tenderness.  No CVA tenderness bilaterally.  No midline lumbar or thoracic spinal tenderness.  No lower extremity motor deficits.    1902 Bacteria Urine(!): Moderate   1902 Leukocyte Esterase Urine(!): 75 Maria C/uL   1902 WBC Urine: 4   1902 RBC Urine: <1   1913 Discussed urine results with patient.  Explained that urinalysis today does not look glaringly infectious and that I would like to wait for culture prior to instituting antibiotics.  Patient in agreement.  We discussed possibility of kidney stone and offered stone CT.  I explained my low suspicion for kidney stone given her symptoms are constant, her most recent CT scan  (12/2024) did not show any renal stones, and today's urine had no RBCs. Patient agrees with deferring CT imaging.  We discussed results, discharge, and follow up. Questions were answered.        Medications given today in the emergency department:  Medications - No data to display    Assessment/Plan: Presentation consistent with acute left-sided low back pain and urinary frequency.  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 Overall, no red flag s/s present to suggest an acutely serious or life threatening condition that would necessitate hospitalization. Plan to discharge home w/ symptomatic managemetn. Indications for re-evaluation in the ER discussed. Patient/parent/guardian understanding and agreeable with the plan and will discharge to home in good condition.   Acutely serious/life threatening considerations:  renal calculi (pain is not colicky, UA is without hematuria), pyelonephritis (non infectious urinalysis), aortic dissection (pain was neither sudden nor severe, no history of poorly controlled HTN), cauda equina    New prescriptions started at today's ED visit:   Discharge Medication List as of 1/30/2025  7:22 PM            Medical Decision Making  Obtained supplemental history:Supplemental history obtained?: No  Reviewed external records: External records reviewed?: Documented in chart  Care impacted by chronic illness:Cancer/Chemotherapy  Care significantly affected by social determinants of health:N/A  Did you consider but not order tests?: Work up considered but not performed and documented in chart, if applicable  Did you interpret images independently?: Independent interpretation  "of ECG and images noted in documentation, when applicable.  Consultation discussion with other provider:Did you involve another provider (consultant, , pharmacy, etc.)?: No  Discharge. No recommendations on prescription strength medication(s). See documentation for any additional details.    Not Applicable     =================================================================      HISTORY OF PRESENT ILLNESS   Patient information was obtained from: The patient   Use of Intrepreter: N/A     Nathalie Bashir is a 59 year old female with a pertinent history of asymptomatic bacteriuria, lumbosacral spondylolysis, chronic pain seen by pain medicine and on chronic oxycodone 3 times daily, and gastric adenocarcinoma who presents to the ED by private vehicle for evaluation of urinary frequency.  Patient reports she has had 1 week of back/flank pain.  Pain is constant with no triggers.  No recent back injuries.  Patient initially, she thought that this was a pulled muscle but today, she noticed that her urine was foul-smelling and that she was peeing more frequently.  She does follow with urology (MD Agustin).  She reached out to her urology office on 01/23 and they prescribed her fosfomycin which she completed.  She notes a history of a kidney infection 2 years ago but denies any history of kidney stones.  For pain at home, she has been taking oxycodone 3 times daily and Flexeril which is prescribed to her by her pain management doctor.  She states she cannot take acetaminophen due to an allergy and that given history of gastric carcinoma, she also cannot tolerate NSAIDs.  Endorses \"fever\" of 99  F earlier today.  No vomiting or hematuria.  No vaginal symptoms.    Per chart review:  01/23/2025 - Patient reports smelly urine, pelvic cramping, chills, and dysuria.  MD Agustin (urology) prescribed fosfomycin.  01/14/2025 - Patient seen by pain management regarding chronic bilateral low back pain. Notably radiates into bilateral " "posterior thighs, lateral calf, and stopping at the ankles. Bilateral L3-5 MBB/RFA process ordered. We also will treat this patient for cervical radiculopathy and right shoulder arthropathy, and interventional management has been quite successful in this, so we will continue as needed.  01/02/2025 - Patient was seen at [facility] by Dr. Espino with infectious disease due to asymptomatic bacteriuria.Patient with \"22 urine cultures have been done since July of 2020 with the majority having 2 species of bacteria, low colony counts or obvious mixed/contamination of the sample. Has been heavily treated over time for these cultures so now ESBL is present.\" UA/reflex culture at patient's request but she was informed they would not be treating any + result and she would then need to discuss with urology any additional measures of treatment.     MEDICAL HISTORY     Past Medical History:   Diagnosis Date    Allergic rhinitis     Cancer (H)     Chronic pain     Closed fracture of distal end of left radius     Depression     Gastric adenocarcinoma (H)     Lumbago     Migraine     Opioid dependence (H)     Other chronic pain     Sacroiliitis     Trochanteric bursitis        Past Surgical History:   Procedure Laterality Date    ARTHROPLASTY HIP Left 2016    BACK SURGERY      L4-5 decompression    CATARACT IOL, RT/LT      CHOLECYSTECTOMY N/A 1/28/2020    Procedure: Cholecystectomy;  Surgeon: Yandel Mallory MD;  [facility]    ENDOSCOPIC RETROGRADE CHOLANGIOPANCREATOGRAM N/A 7/27/2020    Procedure: ENDOSCOPIC RETROGRADE CHOLANGIOPANCREATOGRAPHY  **Latex Allergy** with jejunal biopsies;  Surgeon: Jeet Aviles MD;  [facility]    ESOPHAGOSCOPY, GASTROSCOPY, DUODENOSCOPY (EGD), COMBINED N/A 3/3/2020    Procedure: ESOPHAGOGASTRODUODENOSCOPY, WITH ENDOSCOPIC US (enoxaparin);  Surgeon: Bakari Plata MD;  [facility]    ESOPHAGOSCOPY, GASTROSCOPY, DUODENOSCOPY (EGD), COMBINED N/A " 12/7/2020    Procedure: Upper Endoscopy with stent placement;  Surgeon: Jeet Aviles MD;  Location:  OR    ESOPHAGOSCOPY, GASTROSCOPY, DUODENOSCOPY (EGD), COMBINED N/A 2/8/2021    Procedure: ESOPHAGOGASTRODUODENOSCOPY (EGD)AND STENT REMOVAL;  Surgeon: Jeet Aviles MD;  Location:  OR    EYE SURGERY      GASTRECTOMY N/A 10/21/2020    Procedure: Open subtotal gastectomy with David en Y Reconstruction; D1 Lymph Node Disection  **Latex Allergy**;  Surgeon: Yandel Mallory MD;  [facility]    INJECT BLOCK MEDIAL BRANCH CERVICAL/THORACIC/LUMBAR Bilateral 9/27/2023    Procedure: BILATERAL L4-5, L5-S1 medial branch block #1;  Surgeon: Maria Victoria Urbina MD;  Location: [facility] OR    IR CHEST PORT PLACEMENT > 5 YRS OF AGE  3/26/2020    IR LUMBAR EPIDURAL STEROID INJECTION  8/25/2009    IR LUMBAR EPIDURAL STEROID INJECTION  9/2/2009    IR LUMBAR EPIDURAL STEROID INJECTION  9/25/2009    IR LUMBAR EPIDURAL STEROID INJECTION  11/23/2009    IR LUMBAR EPIDURAL STEROID INJECTION  4/1/2010    IR LUMBAR EPIDURAL STEROID INJECTION  9/1/2010    IR LUMBAR EPIDURAL STEROID INJECTION  3/10/2011    IR LUMBAR EPIDURAL STEROID INJECTION  8/30/2011    IR PICC PLACEMENT > 5 YRS OF AGE  10/12/2016    IR PORT REMOVAL RIGHT  12/8/2023    LAPAROSCOPY DIAGNOSTIC (GENERAL) N/A 10/13/2020    Procedure: Diagnostic laparoscopy with peritoneal washings  **Latex Allergy**;  Surgeon: Yandel Mallory MD;  [facility]    OPEN REDUCTION INTERNAL FIXATION RODDING INTRAMEDULLARY FEMUR Left 12/23/2014    Procedure: OPEN REDUCTION INTERNAL FIXATION RODDING INTRAMEDULLARY FEMUR;  Surgeon: Arnol Santiago MD;  [facility] OR    OPEN REDUCTION INTERNAL FIXATION WRIST Left 9/22/2022    Procedure: LEFT WRIST OPEN REDUCTION INTERNAL FIXATION, FRACTURE;  Surgeon: Mark Villalobos MD;  Location: [facility] OR    ORTHOPEDIC SURGERY      PICC DOUBLE LUMEN PLACEMENT Right 02/07/2020    5 fr double lumen 41 cm    AL LAMINEC/FACETECT/FORAMIN,LUMBAR 1 SEG       Description: Laminectomy Decompress, Facetectomy, Foraminotomy Lumbar Seg;  Recorded: 2012;    REMOVE HARDWARE LOWER EXTREMITY Left 2015    Procedure: REMOVE HARDWARE LOWER EXTREMITY;  Surgeon: Arnol Santiago MD;  [facility] OR    REMOVE HARDWARE RODDING INTRAMEDULLARY FEMUR Left 2015    Procedure: REMOVE HARDWARE RODDING INTRAMEDULLARY FEMUR;  Surgeon: Arnol Santiago MD;  [facility]    RESECT BONE LOWER EXTREMITY Left 2014    Procedure: RESECT BONE LOWER EXTREMITY;  Surgeon: Arnol Santiago MD;  [facility]    SHORTENING OSTEOPLASTY FEMUR W/ IM NAILING      WHIPPLE PROCEDURE N/A 2020    Procedure: Open Whipple procedure and Cholecystectomy;  Surgeon: Yandel Mallory MD;  [facility] OR    WHIPPLE PROCEDURE      ZZC FUSION OF SACROILIAC JOINT      Description: Arthrodesis Sacroiliac Joint Left;  Recorded: 10/07/2011;    ZZC REPAIR DETACH RETINA,SCLERAL BUCKLE         Family History   Problem Relation Age of Onset    Heart Failure Mother 77    Dementia Father         frontal lobe    No Known Problems Sister     No Known Problems Brother     Anesthesia Reaction No family hx of     Deep Vein Thrombosis No family hx of        Social History     Tobacco Use    Smoking status: Former     Current packs/day: 0.00     Average packs/day: 0.5 packs/day for 25.0 years (12.5 ttl pk-yrs)     Types: Cigarettes     Start date: 1995     Quit date: 2020     Years since quittin.0     Passive exposure: Past    Smokeless tobacco: Never   Substance Use Topics    Alcohol use: No    Drug use: No       Most Recent Immunizations   Administered Date(s) Administered    COVID-19 12+ (MODERNA) 2024    COVID-19 Bivalent 18+ (Moderna) 10/05/2022    COVID-19 Monovalent 18+ (Moderna) 2022    DTaP, Unspecified 2009    Flu, Unspecified 2018    Influenza Vaccine 18-64 (Flublok) 10/05/2022    Influenza Vaccine >6 months,quad, PF 10/01/2019    Influenza,INJ,MDCK,PF,Quad  ">6mo(Flucelvax) 09/28/2018    Pneumococcal 23 valent 07/29/2010    TD,PF 7+ (Tenivac) 05/01/2006    TDAP (Adacel,Boostrix) 06/14/2021    Td (Adult), Adsorbed 01/01/1996    Tdap (Adult) Unspecified Formulation 05/01/2006        amitriptyline (ELAVIL) 75 MG tablet  citalopram (CELEXA) 20 MG tablet  cyclobenzaprine (FLEXERIL) 10 MG tablet  famotidine (PEPCID) 20 MG tablet  fluticasone (FLONASE) 50 MCG/ACT nasal spray  ondansetron (ZOFRAN) 8 MG tablet  oxyCODONE (ROXICODONE) 5 MG tablet  pantoprazole (PROTONIX) 40 MG EC tablet  phenazopyridine (PYRIDIUM) 100 MG tablet  sennosides (SENOKOT) 8.6 MG tablet  simethicone 80 MG TABS  SUMAtriptan (IMITREX) 100 MG tablet        PHYSICAL EXAM   VITALS:  Patient Vitals for the past 24 hrs:   BP Temp Temp src Pulse Resp SpO2 Height Weight   01/30/25 1738 112/70 98.7  F (37.1  C) Oral 100 18 96 % 1.727 m (5' 8\") 73.5 kg (162 lb)     Body mass index is 24.63 kg/m .     Vitals reviewed. Nursing notes reviewed.  CONSITUTIONAL: Generally well appearing female in no acute distress. Well developed and nourished.   EYES: Conjunctivae clear, no discharge. EOM grossly intact.  HENT: Normocephalic, atraumatic.  NECK: Supple, gross ROM intact.  RESPIRATORY: Normal work of breathing, normal rate, speaks in full sentences.  CARDIO: Normal heart rate.  GI: Non distended, non tender. No CVA tenderness.  MSK: Moving all 4 extremities intentionally and without pain. No midline cervical, thoracic, or lumbar spinal tenderness. Left sided paraspinal tenderness.  SKIN: Warm, dry. No rashes or lesions.  NEURO:  AxOx4. No focal neurological deficits  PSYCH: Thoughts linear and responses appropriate. Cooperative. Appropriate mood and affect.      LABS & IMAGING   All pertinent labs and imaging reviewed and interpreted.  Results for orders placed or performed during the hospital encounter of 01/30/25   UA with Microscopic reflex to Culture    Specimen: Urine, Clean Catch   Result Value Ref Range    Color " Urine Light Yellow Colorless, Straw, Light Yellow, Yellow    Appearance Urine Clear Clear    Glucose Urine Negative Negative mg/dL    Bilirubin Urine Negative Negative    Ketones Urine Negative Negative mg/dL    Specific Gravity Urine 1.013 1.001 - 1.030    Blood Urine Negative Negative    pH Urine 6.5 5.0 - 7.0    Protein Albumin Urine Negative Negative mg/dL    Urobilinogen Urine <2.0 <2.0 mg/dL    Nitrite Urine Negative Negative    Leukocyte Esterase Urine 75 Maria C/uL (A) Negative    Bacteria Urine Moderate (A) None Seen /HPF    Mucus Urine Present (A) None Seen /LPF    RBC Urine <1 <=2 /HPF    WBC Urine 4 <=5 /HPF    Squamous Epithelials Urine 1 <=1 /HPF     I, Rashel Coles, am serving as a scribe to document services personally performed by Padmini Barcenas PA-C, based on my observation and the provider's statements to me. I, Padmini Barcenas PA-C attest that Rashel Coles is acting in a scribe capacity, has observed my performance of the services and has documented them in accordance with my direction.     Padmini Barcenas PA-C   Emergency Medicine   [facility] EMERGENCY DEPARTMENT  [facility] [facility] [id number] [phone]  Dept: [phone]     Padmini Barcenas PA-C  01/30/25 8167

## 2025-02-04 ENCOUNTER — LAB (OUTPATIENT)
Dept: LAB | Facility: HOSPITAL | Age: 60
End: 2025-02-04
Payer: MEDICARE

## 2025-02-04 DIAGNOSIS — R39.89 SUSPECTED UTI: ICD-10-CM

## 2025-02-04 DIAGNOSIS — R39.89 SUSPECTED UTI: Primary | ICD-10-CM

## 2025-02-04 LAB
ALBUMIN UR-MCNC: NEGATIVE MG/DL
APPEARANCE UR: CLEAR
BILIRUB UR QL STRIP: NEGATIVE
COLOR UR AUTO: ABNORMAL
GLUCOSE UR STRIP-MCNC: NEGATIVE MG/DL
HGB UR QL STRIP: ABNORMAL
KETONES UR STRIP-MCNC: NEGATIVE MG/DL
LEUKOCYTE ESTERASE UR QL STRIP: ABNORMAL
MUCOUS THREADS #/AREA URNS LPF: PRESENT /LPF
NITRATE UR QL: POSITIVE
PH UR STRIP: 6 [PH] (ref 5–7)
RBC URINE: 2 /HPF
SP GR UR STRIP: 1.02 (ref 1–1.03)
SQUAMOUS EPITHELIAL: 3 /HPF
TRANSITIONAL EPI: <1 /HPF
UROBILINOGEN UR STRIP-MCNC: <2 MG/DL
WBC URINE: 7 /HPF

## 2025-02-04 PROCEDURE — 87186 SC STD MICRODIL/AGAR DIL: CPT

## 2025-02-04 PROCEDURE — 87088 URINE BACTERIA CULTURE: CPT

## 2025-02-04 PROCEDURE — 81001 URINALYSIS AUTO W/SCOPE: CPT

## 2025-02-04 NOTE — PROGRESS NOTES
Call placed to patient. She reports pain at her left flank, pain with urination and malodorous urine. UA/UC orders placed. Advised patient to monitor for fever >101, nausea and vomiting, hematuria, inability to urinate and be seen in ED if they develop.      Thank you,  Ellen Jackson RN, BSN   Urology Triage Nurse

## 2025-02-06 LAB
BACTERIA UR CULT: ABNORMAL

## 2025-02-07 LAB
BACTERIA UR CULT: ABNORMAL
BACTERIA UR CULT: ABNORMAL

## 2025-02-07 NOTE — RESULT ENCOUNTER NOTE
Cephalexin 500 mg po BID for 5 days ordered. Call placed to patient and message left to inform her.      Thank you,  Ellen Jackson RN, BSN   Urology Triage Nurse

## 2025-02-14 ENCOUNTER — MYC REFILL (OUTPATIENT)
Dept: PALLIATIVE MEDICINE | Facility: OTHER | Age: 60
End: 2025-02-14
Payer: MEDICARE

## 2025-02-14 DIAGNOSIS — M25.511 CHRONIC RIGHT SHOULDER PAIN: ICD-10-CM

## 2025-02-14 DIAGNOSIS — G89.29 CHRONIC RIGHT SHOULDER PAIN: ICD-10-CM

## 2025-02-14 DIAGNOSIS — G89.29 CHRONIC INTRACTABLE PAIN: ICD-10-CM

## 2025-02-14 DIAGNOSIS — M54.12 CERVICAL RADICULOPATHY: ICD-10-CM

## 2025-02-14 RX ORDER — OXYCODONE HYDROCHLORIDE 5 MG/1
5 TABLET ORAL 3 TIMES DAILY PRN
Qty: 90 TABLET | Refills: 0 | Status: SHIPPED | OUTPATIENT
Start: 2025-02-14

## 2025-02-14 NOTE — TELEPHONE ENCOUNTER
Pending Prescriptions:                       Disp   Refills    oxyCODONE (ROXICODONE) 5 MG tablet        90 tab*0            Sig: Take 1 tablet (5 mg) by mouth 3 times daily as           needed for severe pain. Fill/start 02/14/25   [facility]

## 2025-02-14 NOTE — TELEPHONE ENCOUNTER
Refills have been requested for the following medications:         oxyCODONE (ROXICODONE) 5 MG tablet [Desi Albright]     Preferred pharmacy: [facility] [address]

## 2025-02-14 NOTE — TELEPHONE ENCOUNTER
Received call from patient requesting refill(s) of Roxicodone    Last dispensed from pharmacy on: Darin. 15, 2025     Patient's last office/virtual visit by prescribing provider on: Dec. 24, 2024   Next office/virtual appointment scheduled for: Mar. 25, 2025       UDT: Oct. 29, 2024   CSA: Oct. 29, 2024       E-prescribe to      [facility] [address]            Will route to nursing Freedom for review and preparation of prescription(s).

## 2025-02-18 DIAGNOSIS — F41.9 ANXIETY: ICD-10-CM

## 2025-02-18 NOTE — TELEPHONE ENCOUNTER
Requested Renewals     Name from pharmacy: hydrOXYzine 25mg PAMOATE CAP         Will file in chart as: hydrOXYzine john (VISTARIL) 25 MG capsule    Sig: Take 1-2 capsules (25-50 mg) by mouth 4 times daily as needed for itching or anxiety    Disp: 240 capsule    Refills: 2    Start: 2/18/2025    Class: E-Prescribe    Non-formulary For: Anxiety    Last ordered: 6 months ago (7/24/2024) by NINA Kirkpatrick CNP    Last refill: 10/22/2024    Rx #: [id number]    Antihistamines Protocol [id number]/18/2025 08:45 AM   Protocol Details Medication is active on med list and the sig matches. RN to manually verify dose and sig if red X/fail.    Patient is age 3 or older    Recent (12 month) or future (90 days) visit with authorizing provider s specialty    Medication indicated for associated diagnosis      To be filled at: [facility] [address] [facility]

## 2025-02-18 NOTE — TELEPHONE ENCOUNTER
Received call from patient  requesting refill(s) for hydrOXYzine john (VISTARIL) 25 MG capsule     Last refilled on 10/22/24    Patient last seen on 12/24/24  Next appt scheduled for 03/25/25    E-prescribe to:     [facility] [place], [place] - [address]     Will facilitate refill.      Heather Franklin MA  [facility]

## 2025-02-19 RX ORDER — HYDROXYZINE PAMOATE 25 MG/1
25-50 CAPSULE ORAL 4 TIMES DAILY PRN
Qty: 240 CAPSULE | Refills: 2 | Status: SHIPPED | OUTPATIENT
Start: 2025-02-19 | End: 2025-05-20

## 2025-02-25 ENCOUNTER — LAB (OUTPATIENT)
Dept: LAB | Facility: HOSPITAL | Age: 60
End: 2025-02-25
Payer: MEDICARE

## 2025-02-25 DIAGNOSIS — R39.89 SUSPECTED UTI: ICD-10-CM

## 2025-02-25 DIAGNOSIS — R39.89 SUSPECTED UTI: Primary | ICD-10-CM

## 2025-02-25 LAB
ALBUMIN UR-MCNC: NEGATIVE MG/DL
APPEARANCE UR: CLEAR
BACTERIA #/AREA URNS HPF: ABNORMAL /HPF
BILIRUB UR QL STRIP: NEGATIVE
COLOR UR AUTO: ABNORMAL
GLUCOSE UR STRIP-MCNC: NEGATIVE MG/DL
HGB UR QL STRIP: NEGATIVE
KETONES UR STRIP-MCNC: NEGATIVE MG/DL
LEUKOCYTE ESTERASE UR QL STRIP: ABNORMAL
NITRATE UR QL: POSITIVE
PH UR STRIP: 6 [PH] (ref 5–7)
RBC URINE: <1 /HPF
SP GR UR STRIP: 1.01 (ref 1–1.03)
SQUAMOUS EPITHELIAL: 2 /HPF
UROBILINOGEN UR STRIP-MCNC: <2 MG/DL
WBC URINE: 14 /HPF

## 2025-02-25 PROCEDURE — 81001 URINALYSIS AUTO W/SCOPE: CPT

## 2025-02-25 PROCEDURE — 87186 SC STD MICRODIL/AGAR DIL: CPT

## 2025-02-25 NOTE — PROGRESS NOTES
Orders for UA/UC placed. Call placed to patient to let her know. Scheduling number given.      Thank you,  Ellen Jackson RN, BSN   Urology Triage Nurse

## 2025-02-26 LAB — BACTERIA UR CULT: ABNORMAL

## 2025-02-27 DIAGNOSIS — N39.0 UTI (URINARY TRACT INFECTION): Primary | ICD-10-CM

## 2025-02-27 RX ORDER — CIPROFLOXACIN 500 MG/1
500 TABLET, FILM COATED ORAL EVERY 12 HOURS
Qty: 14 TABLET | Refills: 0 | Status: SHIPPED | OUTPATIENT
Start: 2025-02-27 | End: 2025-03-06

## 2025-02-27 NOTE — PROGRESS NOTES
Sending Ciprofloxacin 500mg PO BID for 7 days for UC+ for klebsiella pneumoniae per UTI protocol--allergies reviewed. Called to pt and notified her of treatment course, confirmed pharmacy [facility] in [place].         Kevin NGUYEN RN  Urology Triage

## 2025-03-04 ENCOUNTER — TELEPHONE (OUTPATIENT)
Dept: PALLIATIVE MEDICINE | Facility: OTHER | Age: 60
End: 2025-03-04
Payer: MEDICARE

## 2025-03-04 NOTE — TELEPHONE ENCOUNTER
Preprocedure appointment reminder call Cervical Medial Branch Block    Arrival time     Location: [facility] [address]    Do you have a ? yes    If patient doesn't have a  they will need to call and reschedule    MBB do not need to hold Blood Thinner    If not check with the nurse or provider. The procedure will most likely need to be rescheduled.    Are you taking an antibiotic for an active infection? no    Have you been running a fever lately? No     If yes check with the nurse or provider. The procedure will most likely need to be rescheduled.    You can eat and drink as normal and take your short acting pain medications as long as your pain is likely to be above a four (moderate pain) even with your pain medication. You can take your long acting pain medication. For example Oxycontin ER.     Your pain needs to be from four to ten on the pain scale or the provider cannot go through with the procedure.      Take your  BP and diabetes medications if this applies to you.(note patient has been informed yes or no)      To call and reschedule or talk to the nurse about any questions you may have please dial    [phone]

## 2025-03-05 ENCOUNTER — RADIOLOGY INJECTION OFFICE VISIT (OUTPATIENT)
Dept: PALLIATIVE MEDICINE | Facility: OTHER | Age: 60
End: 2025-03-05
Attending: STUDENT IN AN ORGANIZED HEALTH CARE EDUCATION/TRAINING PROGRAM
Payer: MEDICARE

## 2025-03-05 VITALS — DIASTOLIC BLOOD PRESSURE: 62 MMHG | HEART RATE: 86 BPM | OXYGEN SATURATION: 98 % | SYSTOLIC BLOOD PRESSURE: 103 MMHG

## 2025-03-05 DIAGNOSIS — M47.817 LUMBOSACRAL SPONDYLOSIS WITHOUT MYELOPATHY: ICD-10-CM

## 2025-03-05 DIAGNOSIS — M47.817 SPONDYLOSIS OF LUMBOSACRAL REGION WITHOUT MYELOPATHY OR RADICULOPATHY: ICD-10-CM

## 2025-03-05 PROCEDURE — 250N000011 HC RX IP 250 OP 636: Performed by: STUDENT IN AN ORGANIZED HEALTH CARE EDUCATION/TRAINING PROGRAM

## 2025-03-05 PROCEDURE — 255N000002 HC RX 255 OP 636: Performed by: STUDENT IN AN ORGANIZED HEALTH CARE EDUCATION/TRAINING PROGRAM

## 2025-03-05 PROCEDURE — 64493 INJ PARAVERT F JNT L/S 1 LEV: CPT | Mod: 50 | Performed by: STUDENT IN AN ORGANIZED HEALTH CARE EDUCATION/TRAINING PROGRAM

## 2025-03-05 RX ADMIN — LIDOCAINE HYDROCHLORIDE 3 ML: 20 INJECTION, SOLUTION INTRAVENOUS at 16:19

## 2025-03-05 RX ADMIN — IOHEXOL 10 ML: 180 INJECTION INTRAVENOUS at 16:05

## 2025-03-05 ASSESSMENT — PAIN SCALES - GENERAL
PAINLEVEL_OUTOF10: MILD PAIN (1)
PAINLEVEL_OUTOF10: MODERATE PAIN (6)

## 2025-03-05 NOTE — PROGRESS NOTES
Pre procedure Diagnosis: Lumbar Spondylosis  Post procedure Diagnosis: Same  Procedure performed: bilateral L3-5 diagnostic medial branch blocks #2, CPT code 08552-83 and 32586 -50  Anesthesia: none  Complications: none immediate  Operators: Remberto Gallegos MD, Lynne Wellington MD     Indications:   59 year old female was sent by Desi Albright for bilateral L3-5 medial branch blocks.     Options/alternatives, benefits and risks were discussed with the patient including bleeding, infection, tissue trauma, exposure to radiation, reaction to medications, spinal cord injury, weakness, numbness and paralysis.  Questions were answered to his satisfaction and he agrees to proceed. Voluntary informed consent was obtained and signed.      Vitals were reviewed: Yes  Allergies were reviewed:  Yes   Medications were reviewed:  Yes   Pre-procedure pain score: 6/10     Procedure:  After getting informed consent, patient was brought into the procedure suite and was placed in a prone position on the procedure table.   A procedural pause was performed.  Patient was prepped and draped in sterile fashion.      Under AP fluoroscopic guidance the L4 and L5 vertebral bodies and sacral ala were identified. The C-arm was rotated to the oblique view to afford optimal visualization the pedicles.  Lidocaine 1% was used to anesthetize the skin at each level.  Under intermittent fluoroscopy, 25G 3.5inch Quincke spinal needles were positioned inferior and lateral to the intersection of the transverse process and pedicle at the level of the bilateral L4-5 vertebral bodies, as well as the corresponding sacral alar notch. The needle positions were verified and optimized from the AP view.     The anatomic targets for the L3 & L4 medial branch nerve and L5 dorsal ramus (which functionally incorporates the medial branch) were the  L4 & L5 transverse processes and sacral alar notch, with laterality as described above, resulting in blockade of  the L4/5 and L5/S1 facet joints.     0.2ml Omnipaque-180 was injected at each site, and appropriate spread of contrast was noted without vascular uptake.  A total of 2ml of Omnipaque was used. Ropivacaine 0.5% 0.5 ml was injected at each location.  The needles were removed.       Hemostasis was achieved, the area was cleaned, and bandaids were placed when appropriate.     The patient tolerated the procedure well, and was taken to the recovery room.    Images were saved to PACS.     The patient will continue to monitor progress, and they were given a pain diary to complete at home.  They will either fax or mail this back to us or bring it to their next appointment. We will determine the treatment plan after we review the diary.       Post-procedure pain score: 1/10  Follow-up includes:   -will await diary for further planning.      Physician Attestation   I, Remberto Gallegos MD, was present for all key and critical portions of the procedure, and I was immediately available during the remainder of the procedure.  Any changes to the documentation have been made above.    Remberto Gallegos MD  Interventional Pain Medicine  [facility]

## 2025-03-05 NOTE — PATIENT INSTRUCTIONS
[facility] [place]  Medial Branch Block Discharge Instructions      Your procedure was performed by:  Dr. Gallegos    Medications used include:  Lidocaine   Omnipaque   Ropivicaine     You will need to complete the Pain Scale Log form and return it to us as soon as possible.  Once we have received the form, we will review it and call you to determine the next steps.     The form can be faxed to  or mailed to: -  [facility]    [address], [address]     [place] [address]    You may resume your regular medications  You may resume your regular activities  Be cautious with walking as numbness and/or weakness in the lower extremities may occur for up to 6-8 hours due to the effects of the anesthetic.  Avoid driving for 6 hours. The local anesthetic could slow your reflexes.   You may shower, however no swimming or tub baths or hot tubs for 24 hours following your procedure.  Your pain will return after the numbing medications have worn off.  You may use your current pain medications as needed.  Unless you have been directed to avoid the use of anti-inflammatory medications (NSAIDS), you may use medications such as ibuprofen, Aleve or Tylenol for pain control if needed.  Some people find it helpful to alternate ibuprofen and Tylenol every 3 hours for a couple of days.  You may use ice packs 10-15 minutes three to four times a day at the injection site for comfort.   Do not use heat to painful areas for 6 to 8 hours. This will give the local anesthetic time to wear off and prevent you from accidentally burning your skin.   If you experience any of the following, call the Pain Clinic at [phone]:  -Fever over 100 degree F  -Swelling, bleeding, redness, drainage, warmth at the injection site  -Progressive weakness or numbness in your legs or arms  -If lumbar, call if you have a loss of bowel or bladder function  -If cervical, call if you have any unusual  headache that is not relieved by Tylenol  -Unusual new onset of pain that is not improving-

## 2025-03-05 NOTE — NURSING NOTE
Discharge Information    IV Discontiued Time:  NA    Amount of Fluid Infused:  NA    Discharge Criteria = When patient returns to baseline or as per MD order    Consciousness:  Pt is fully awake    Circulation:  BP +/- 20% of pre-procedure level    Respiration:  Patient is able to breathe deeply    O2 Sat:  Patient is able to maintain O2 Sat >92% on room air    Activity:  Moves 4 extremities on command    Ambulation:  Patient is able to stand and walk or stand and pivot into wheelchair    Dressing:  Clean/dry or No Dressing    Notes:   Discharge instructions and AVS given to patient    Patient meets criteria for discharge?  YES    Admitted to PCU?  No    Responsible adult present to accompany patient home?  Yes    Signature/Title:    SILVER RANDOLPH, RN  RN Care Coordinator  [facility] [place]

## 2025-03-10 ENCOUNTER — MYC REFILL (OUTPATIENT)
Dept: PALLIATIVE MEDICINE | Facility: OTHER | Age: 60
End: 2025-03-10
Payer: MEDICARE

## 2025-03-10 DIAGNOSIS — G89.29 CHRONIC RIGHT SHOULDER PAIN: ICD-10-CM

## 2025-03-10 DIAGNOSIS — M54.12 CERVICAL RADICULOPATHY: ICD-10-CM

## 2025-03-10 DIAGNOSIS — M25.511 CHRONIC RIGHT SHOULDER PAIN: ICD-10-CM

## 2025-03-10 DIAGNOSIS — G89.29 CHRONIC INTRACTABLE PAIN: ICD-10-CM

## 2025-03-10 NOTE — TELEPHONE ENCOUNTER
Received request for a refill(s) of oxyCODONE (ROXICODONE) 5 MG tablet      Last dispensed from pharmacy on 02/14/25    Patient's last office/virtual visit by prescribing provider on 12/24/24  Next office/virtual appointment scheduled for 03/25/25    Last urine drug screen date 10/29/24  Current opioid agreement on file (completed within the last year) Yes Date of opioid agreement: 10/29/24    E-prescribe to     [facility] [address] [address]  Phone: [phone] Fax: [phone]    Will route to nursing pool for review and preparation of prescription(s).       Heather Franklin MA  [facility]

## 2025-03-10 NOTE — TELEPHONE ENCOUNTER
Nathalie JACOBO Pain Nurse (supporting Desi Albright, APRN CNP)1 hour ago (9:08 AM)       Refills have been requested for the following medications:         oxyCODONE (ROXICODONE) 5 MG tablet [Desi Albright]     Preferred pharmacy: [facility] [address]

## 2025-03-10 NOTE — TELEPHONE ENCOUNTER
Pending Prescriptions:                       Disp   Refills    oxyCODONE (ROXICODONE) 5 MG tablet        90 tab*0            Sig: Take 1 tablet (5 mg) by mouth 3 times daily as           needed for severe pain. Fill 03/18; start 03/20   [facility]

## 2025-03-11 NOTE — IR NOTE
Interventional Radiology Intra-procedural Nursing Note    Patient Name: Nathalie Bashir  Medical Record Number: [id number]  Today's Date: March 26, 2020    Start Time: 0825     End of procedure time: 0848  Procedure: Port a Catheter Placement  Report given to: Ellen Aguirre RN in Care Suites     Other Notes: Pt. transferred to IR table. Prepped and draped appropriately. Monitoring equipment applied. NC applied. No complaints of pain at this time. Timeout recorded.    VSS. Pt remains on RA. No c/o pain at this time. Chest site sutured in multiple layers, dermabond and gauze and tegaderm. Neck site has dermabond and steristrips. Both sites are CDI, soft.    Medication administration:    Fentanyl 200 mcg IVP  Versed 4 mg IVP  Clindamycin 900 mg iV  Heparin 500 units Intraport (given by Dr. Rosenthal)      
RADIOLOGY POST PROCEDURE NOTE w/ SEDATION  Patient name: Nathalie Bashir  MRN: [id number]  : 1965    Pre-procedure diagnosis: port  Post-procedure diagnosis: Same    Procedure Date/Time: 2020  8:17 AM  Procedure: port  Estimated blood loss: None  Specimen(s) collected with description: none    I determined this patient to be an appropriate candidate for the planned sedation and procedure and reassessed the patient IMMEDIATELY PRIOR to sedation and procedure.     The patient tolerated the procedure well with no immediate complications.  Significant findings:none    See imaging dictation for procedural details.    Provider name: Levon Arenas MD  Assistant(s):None      
49.4

## 2025-03-18 DIAGNOSIS — M25.511 CHRONIC RIGHT SHOULDER PAIN: ICD-10-CM

## 2025-03-18 DIAGNOSIS — G89.29 CHRONIC INTRACTABLE PAIN: ICD-10-CM

## 2025-03-18 DIAGNOSIS — M54.12 CERVICAL RADICULOPATHY: ICD-10-CM

## 2025-03-18 DIAGNOSIS — G89.29 CHRONIC RIGHT SHOULDER PAIN: ICD-10-CM

## 2025-03-18 DIAGNOSIS — M62.838 MUSCLE SPASM: ICD-10-CM

## 2025-03-18 RX ORDER — OXYCODONE HYDROCHLORIDE 5 MG/1
5 TABLET ORAL 3 TIMES DAILY PRN
Qty: 90 TABLET | Refills: 0 | Status: SHIPPED | OUTPATIENT
Start: 2025-03-18

## 2025-03-18 RX ORDER — OXYCODONE HYDROCHLORIDE 5 MG/1
5 TABLET ORAL 3 TIMES DAILY PRN
Qty: 90 TABLET | Refills: 0 | OUTPATIENT
Start: 2025-03-18

## 2025-03-18 NOTE — TELEPHONE ENCOUNTER
[facility] Call Center    Phone Message    May a detailed message be left on voicemail: yes     Reason for Call: Other: Patient calling stating her Oxycodone has not been sent over to the pharmacy.  She is needing this today.  Please call her back when sent.      Action Taken: Message routed to:  Other: [facility]     Travel Screening: Not Applicable     Date of Service:

## 2025-03-18 NOTE — TELEPHONE ENCOUNTER
Received fax from pharmacy requesting refill(s) for     cyclobenzaprine (FLEXERIL) 10 MG tablet    Date last filled 2/17/2025    Last Appt Date:12/24/2024    Next Appt scheduled: 3/25/2025    Pharmacy:      [facility] [id number] [place] [address] [place] route for processing    Sandra Reina [facility]

## 2025-03-18 NOTE — TELEPHONE ENCOUNTER
[facility] Call Center    Phone Message    May a detailed message be left on voicemail: yes     Reason for Call: Other: Pt is calling and asking if her medication will be filled today as she needs to pick it up. Please call Pt back to discuss.      Action Taken: Message routed to:  Other: [facility]    Travel Screening: Not Applicable     Date of Service:

## 2025-03-19 RX ORDER — CYCLOBENZAPRINE HCL 10 MG
TABLET ORAL
Qty: 90 TABLET | Refills: 3 | Status: SHIPPED | OUTPATIENT
Start: 2025-03-19

## 2025-03-20 ASSESSMENT — PAIN SCALES - PAIN ENJOYMENT GENERAL ACTIVITY SCALE (PEG)
INTERFERED_ENJOYMENT_LIFE: 3
INTERFERED_GENERAL_ACTIVITY: 4
AVG_PAIN_PASTWEEK: 4
PEG_TOTALSCORE: 3.67

## 2025-03-24 NOTE — PROGRESS NOTES
March 25, 2024     COMPREHENSIVE PAIN CLINIC FOLLOW UP EVALUATION   Ms. Nathalie Bashir on 11/3/2023 in the [facility] per request of Dr. Dobbs with regards to her pain.  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  UDS and opioid agreement singed and UDS appropriate on 11/03/2023.  Nathalie Bashir has been seen at a pain clinic in the past in [place] 10 years ago, [facility] in 1990s and [facility].  UDS and opioid agreement signed on 10/29/2024.     Updates since last appointment on 12/24/2024  She presents alone using a single point cane.  Weather changes increase her pain.    Last appt rotate to oxycodone 5mg TID prn from hydrocodone 5/325 which caused heartburn.  She reports the tylenol in hydrocodone causes GI upset.  She is requesting oxycodone 10mg BID so it would last longer. I explained oxy 5mg and 10mg only last around 4 -5 hrs.    1/15/2025 Dr. El HOLT - decreased pain by 70%  Lumbar RFA 4/23/2025 scheduled with Dr. Gallegos.    She has frequent bladder infections.    TRIPS:  [place] over 7/4.      Interventions/Injections:   3/5/2025 Dr. Gallegos - Lumbar MBB #2 L3-5  1/15/2025 Dr. El HOLT decreased pain by 70%      Progress Notes Reviewed:  3/5/2025 Dr. Gallegos - Lumbar MBB #2 L3-5  1/14/2025 Dr. Gallegos - Bilateral L3-5 MBB/RFA process ordered and consider lumbar MORGAN in future  1/2/2025 Marianne Gonzalez, Family Medicine     Any hospitalizations/ER/UC visits since last appointment:  no  Any falls/accidents since last appointment:  no      Primary Pain :  Neuropathy  Low back pain R=L - hurts the worst in the morning  Neck pain      Characteristics:  Any changes in pain characteristics since last appointment?  no    The patient describes the  pain as constant, stabbing, burning and numbness.      What makes the pain better:  Her pain is improved with laying down, rest.    What makes the pain worse:   She reports that the pain is made worse by moving her head, using her arms, ADL's.     3/25/2025 current pain on 0/10 VAS:    7   Worst pain:     9   Best pain: 4  12/24/2024 current pain on 0/10 VAS:   7    Worst pain:   9   Best pain:  4  10/29/2024 current pain on 0/10 VAS:   8    Worst pain:   9   Best pain:  6     07/24/2024 current pain on 0/10 VAS:   1-2 Worst pain:  10 prior to JONATHON  06/07/2024 current pain on 0/10 VAS:   7    06/07/2024 current pain on 0/10 VAS:   7          03/15/2024 current pain on 0/10 VAS:   6        Current Pain Related Medications:  Any medications changes since last appointment: no    Amitriptyline 75mg q hs   Celexa 20mg 1.5 tabs daily 30mg daily  Cyclobenzaprine 10 mg TID PRN  Hydroxyzine 25mg 1-2 tabs QID anxiety  Zofran 8mg prn nausea with migraines  Oxycodone 5mg TID - last dose last yesterday  Stool softeners PRN  Imitrex 100mg PRN HA - per PCP     Anticoagulation:  none  Implanted Devices:  none      Therapies discuss on initial consult:   Physical therapy, Pain Psychology, TENs unit, Grounding Mat, Frequency Specific Micro Current, Anti-inflammatory Lifestyle    Social:    She is  and  lives in a house.  She has 1 grown son and 4 grandchildren.  She is independent in ADL's.       Employment: She is on SSD.     Exercise:  Last PT was 2018.  Patient exercises with stretching at home. Starting PT on Monday.     Hobbies:  She enjoys her grandchildren. One grandson is autistic.     Mental Health:  Patient reports depression is under control on citalopram 30mg.  Patient does not follow with a mental health care provider.              Plan on 12/24/2024:     Follow-up in 3 months in clinic.     Rotate to oxycodone 5mg TID prn from hydrocodone 5/325 which caused heartburn.     Keep appt for JONATHON in January.     AVS  per My Chart.            Updates since last appointment on 10/29/2024 .  She presents alone using a single point cane.     She has repeat R) shoulder injection in January.       Chemotherapy caused tooth decay.     She is hosting xmas with the Grandsons.        Interventions/Injections:   12/11/2024 Dr. Gallegos R) shoulder injection.     Progress Notes Reviewed:  12/17/2024 Dr. Austin, Hematology - gastric adenocarcinoma  Received FLOT x 8 and then surgery - [id number] disease in 10/2020   12/02/2024 NINA Farah, [facility] - R) finger pain     Any hospitalizations/ER/UC visits since last appointment:  no  Any falls/accidents since last appointment:  no        Primary Pain :  Low back pain  neck pain L=R on left radiating down b/l arms.  She is not sleeping well.  JONATHON decreased her pain by at least 50%.         Characteristics:  Any changes in pain characteristics since last appointment?  no     The patient describes the pain as constant, stabbing, burning and numbness.      What makes the pain better:  Her pain is improved with laying down, rest.    What makes the pain worse:   She reports that the pain is made worse by moving her head, using her arms, ADL's.     12/24/2024 current pain on 0/10 VAS:   7    Worst pain:   9   Best pain:  4  10/29/2024 current pain on 0/10 VAS:   8    Worst pain:   9   Best pain:  6     07/24/2024 current pain on 0/10 VAS:   1-2 Worst pain:  10 prior to JONATHON  06/07/2024 current pain on 0/10 VAS:   7    06/07/2024 current pain on 0/10 VAS:   7          03/15/2024 current pain on 0/10 VAS:   6          Current Pain Related Medications:  Any medications changes since last appointment: no     Amitriptyline 75mg q hs   Celexa 20mg 1.5 tabs daily 30mg daily  Cyclobenzaprine 10 mg TID PRN  Hydroxyzine 25mg 1-2 tabs QID anxiety  Zofran 8mg prn nausea with migraines  Oxycodone 5mg TID - last dose last yesterday  Stool softeners PRN  Imitrex 100mg PRN HA - per PCP        Therapies  discuss on initial consult:   Physical therapy, Pain Psychology, TENs unit, Grounding Mat, Frequency Specific Micro Current, Anti-inflammatory Lifestyle     Social:    She is  and  lives in a house.  She has 1 grown son and 4 grandchildren.  She is independent in ADL's.       Employment: She is on SSD.     Exercise:  She did PT in 04/2024 but she continues to do HEP.  Last PT was 2018.  Patient exercises with stretching at home.      Hobbies:  She enjoys her grandchildren. One grandson is autistic.     Mental Health:  Patient reports depression is under control on citalopram 30mg.  Patient does not follow with a mental health care provider.           Plan on 10/29/2024:  Opioid rotation to Norco 5/325mg TID per patient request.     Schedule R) shoulder joint injection with Dr. Gallegos and 30 days later schedule repeat JONATHON with Dr. Gallegos.     Follow up in clinic in 2 months.           Updates since last appointment on 07/24/2024.  R) ankle scope last month through [facility].  She has a followup  appt in 4-6 wks.  No additional pain medication was prescribed.     Worst pain is R) shoulder and neck.  Will repeat R) shoulder injection with Dr. Gallegos first then 30 days later can schedule a repeat JONATHON.  She is using cold packs and voltaren on R) shoulder.     She is requesting rotation back to hydrocodone-acet 5/325mg TID - more effective per patient.           Interventions/Injections:   07/10/2024 Dr. Gallegos JONATHON C7-T1  1/18/24 R) shoulder injection with Dr Gallegos: approx 65% improvement, also going to PT which is helpful.        Progress Notes Reviewed:  10/20/2024 ED - weakness and flank pain, dx with UTI and received script for Cipro.        Any hospitalizations/ER/UC visits since last appointment:  yes 10/20/2024  Any falls/accidents since last appointment:  no        Primary Pain :  neck pain L=R on left radiating down b/l arms.  She is not sleeping well.  JONATHON decreased her pain by at least 50%.  She did PT  in 04/2024 but she continues to do HEP.  Ice is helpful.     Characteristics:  Any changes in pain characteristics since last appointment?  no     The patient describes the pain as constant, stabbing, burning and numbness.      What makes the pain better:  Her pain is improved with laying down, rest.    What makes the pain worse:   She reports that the pain is made worse by moving her head, using her arms, ADL's.     10/29/2024 current pain on 0/10 VAS:   8   Worst pain:   9   Best pain:  6     07/24/2024 current pain on 0/10 VAS:   1-2    Worst pain:   10 prior to JONATHON  06/07/2024 current pain on 0/10 VAS:   7    06/07/2024 current pain on 0/10 VAS:   7          03/15/2024 current pain on 0/10 VAS:   6          Current Pain Related Medications:  Any medications changes since last appointment: no     Amitriptyline 75mg q hs   Celexa 20mg 1.5 tabs daily 30mg daily  Cyclobenzaprine 10 mg TID PRN  Hydroxyzine 25mg 1-2 tabs QID anxiety  Zofran 8mg prn nausea with migraines  Oxycodone 5mg TID - last dose last saturday.  - losing effectiveness  Stool softeners PRN  Imitrex 100mg PRN HA - per PCP     UDS and opioid agreement signed on 11/03/2023.     Therapies discuss on initial consult:   Physical therapy, Pain Psychology, TENs unit, Grounding Mat, Frequency Specific Micro Current, Anti-inflammatory Lifestyle     Social:    She is  and  lives in a house.  She has 1 grown son and 4 grandchildren.  She is independent in ADL's.       Employment: She is on SSD.     Exercise:  Last PT was 2018.  Patient exercises with stretching at home. Starting PT on Monday.     Hobbies:  She enjoys her grandchildren.     Mental Health:  Patient reports depression is under control on citalopram 30mg.  Patient does not follow with a mental health care provi                 Plan on 07/24/2024:     Refill oxycodone 5mg TID 90 tabs for 30 days.  Refill hydroxyzine 25mg 1-2 tabs QID     Follow-up in three months in clinic.            Updates since last appointment on 06/24/2024.  Presents alone using a single point cane.     6/24/2024 filled hydrocodone 5/325mg TID for 3 wks since she overused last month for dental pain. Last dose this am.    Needs refills of oxycodone 5mg TID 90 tabs for 30 days.  Needs refills of hydroxyzine 25mg 1-2 tabs QID.     Neck pain after JONATHON decreased pain by 50%.     Mother in law is in hospice.              Interventions/Injections:   07/10/2024 Dr. El HOLT C7-T1  1/18/24 R) shoulder injection with Dr Gallegos: approx 65% improvement, also going to PT which is helpful.     Progress Notes Reviewed:  07/10/2024 Dr. El HOLT C7-T1        Any hospitalizations/ER/UC visits since last appointment:  no  Any falls/accidents since last appointment:  no        Primary Pain :  neck pain L=R on left radiating down b/l arms.  She is not sleeping well.  JONATHON decreased her pain by at least 50%.  She did PT in 04/2024 but she continues to do HEP.  Ice is helpful.        Characteristics:  Any changes in pain characteristics since last appointment?  Yes 50% less pain after JONATHON     The patient describes the pain as constant, stabbing, burning and numbness.      What makes the pain better:  Her pain is improved with laying down, rest.    What makes the pain worse:   She reports that the pain is made worse by moving her head, using her arms, ADL's.     07/24/2024 current pain on 0/10 VAS:   1-2    Worst pain:   10 prior to JONATHON  06/07/2024 current pain on 0/10 VAS:   7    06/07/2024 current pain on 0/10 VAS:   7          03/15/2024 current pain on 0/10 VAS:   6             Current Pain Related Medications:  Any medications changes since last appointment: no        Start hydrocodone 5/325mg TID for 3 wks.     Amitriptyline 75mg q hs   Celexa 20mg 1.5 tabs daily 30mg daily  Cyclobenzaprine 10 mg TID PRN  Hydroxyzine 25mg 1-2 tabs QID anxiety  Zofran 8mg prn nausea with migraines  Oxycodone 5mg TID - last dose last saturday.     Stool softeners PRN  Imitrex 100mg PRN HA - per PCP     UDS and opioid agreement signed on 11/03/2023.           Therapies discuss on initial consult:   Physical therapy, Pain Psychology, TENs unit, Grounding Mat, Frequency Specific Micro Current, Anti-inflammatory Lifestyle     Social:    She is  and  lives in a house.  She has 1 grown son and 4 grandchildren.  She is independent in ADL's.       Employment: She is on SSD.     Exercise:  Last PT was 2018.  Patient exercises with stretching at home. Starting PT on Monday.     Hobbies:  She enjoys her grandchildren.     Mental Health:  Patient reports depression is under control on citalopram 30mg.  Patient does not follow with a mental health care provi                    Plan on 06/24/2024:  Interventions:  JONATHON with Dr. Gallegos        Medications:  Start hydrocodone 5/325mg TID for 3 wks.     Amitriptyline 75mg q hs   Celexa 20mg 1.5 tabs daily 30mg daily  Cyclobenzaprine 10 mg TID PRN  Hydroxyzine 25mg 1-2 tabs QID anxiety  Zofran 8mg prn nausea with migraines  Oxycodone 5mg TID - last dose last saturday.    Stool softeners PRN  Imitrex 100mg PRN HA - per PCP     UDS and opioid agreement signed on 11/03/2023.        Follow up:   Follow-up in clinic in 2 wks after JONATHON with Dr. Gallegos.           Updates since last appointment on 06/07/2024   [place] broke and had it repaired and developed dry socket.      She accidentally over used her oxycodone and ran out last Saturday night - last filled on 06/06/2024.  Discussed opioid agreement and she cannot self escalate the opioids for acute dental pain.  She acknowledged understanding.  If there is another violation of her opioid agreement  will have to taper off C2 opioids.     neck pain L=R on left radiating down b/l arms.  She is not sleeping well.  She had JONATHON many years ago at [place] which decreased her pain by at least 50% for 6 months.. She did PT in 04/2024 but she continues to do HEP.  Ice is helpful.      Started compounding cream with ketamine 8% and gabapentin 8% to neck, shoulder, feet and started triggering migraines so she stopped the medication.          Interventions/Injections:   1/18/24 R) shoulder injection with Dr Gallegos: approx 65% improvement, also going to PT which is helpful.        Progress Notes Reviewed:  04/27/2024 ED - [facility]  04/19/2024 Dr. Apurva Dobbs PM&R  04/11/2024 PT - R) shoulder pain  04/01/2024 ED - [facility]  03/15/2024 Sue Herrera, Pain Clinic     Any hospitalizations/ER/UC visits since last appointment:  no  Any falls/accidents since last appointment:  no        Primary Pain :  Neck and right shoulder        Characteristics:  No changes in pain characteristics since last appointment.     What makes the pain better:  Her pain is improved with laying down, rest.    What makes the pain worse:   She reports that the pain is made worse by moving her head, using her arms, ADL's.  06/07/2024 current pain on 0/10 VAS:   7    06/07/2024 current pain on 0/10 VAS:   7          03/15/2024 current pain on 0/10 VAS:   6           Current Pain Related Medications:  Any medications changes since last appointment: no     Amitriptyline 75mg q hs   Celexa 20mg 1.5 tabs daily 30mg daily  Compounding medication - stopped due to migraines  Cyclobenzaprine 10 mg TID PRN - needs refills today  Hydroxyzine 25mg 1-2 tabs QID anxiety  Zofran 8mg prn nausea with migraines - needs refills today for migraines  Oxycodone 5mg TID - last dose this am.  Last script per  05/07/2024.  Stool softeners PRN  Imitrex 100mg PRN HA - per PCP        Interventions:  JONATHON is scheduled 07/10/2024  She would like to repeat R) shoulder injection in the future        Therapies discuss on initial consult:   Physical therapy, Pain Psychology, TENs unit, Grounding Mat, Frequency Specific Micro Current, Anti-inflammatory Lifestyle     Social:    She is  and  lives in a house.  She has 1 grown son and 4 grandchildren.  She  is independent in ADL's.       Employment: She is on SSD.     Exercise:  Last PT was 2018.  Patient exercises with stretching at home. Starting PT on Monday.     Hobbies:  She enjoys her grandchildren.     Mental Health:  Patient reports depression is under control on citalopram 30mg.  Patient does not follow with a mental health care provider.               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  Stomach cancer was treated with gastrectomy and chemotherapy.  Doctors found stomach cancer when they did Whipple Surgery 01/2020 at [facility].  Patient has numbness and tingling in legs in the stocking foot distribution and b/l hands.  Patient has lumbar pain and has had lumbar MBB x 1 with Dr. Urbina.  Patient has not had any spine surgery in the past.  The patient describes the pain as constant, stabbing, burning and numbness.  She reports that the pain is made worse by prolong standing, ADL's.  Her pain is improved with laying down, rest.  She rates her currenty pain score at 7/10, but it can be as low as 3/10 or as severe as 9/10. Patient follows with Dr. Urbina for lumbar intervention options.  Rheumatology could not find any cause for her joint pain.        Progress Notes Reviewed:  10/19/2023 Dr. Dobbs, PM&R - order quad cane and compression stockings, follow-up with Dr. Urbina for low back pain  10/17/2023 Dr. Mckoy, Rheumatology  10/01/2023 UC - UTI  09/27/2023 Dr. Urbina - lumbar MBB L4-5 and L5-S1  09/12/2023 Dr. Urbina     Patient reports depression is under control on citalopram 30mg.  Patient does not follow with a mental health care provider.  Last PT was 2018.  Patient exercises with stretching at home.     She denies any new problems with falls or balance, any new numbness or  weakness of the arms or legs, any new bowel or bladder incontinence, any night sweats or unexplained fevers, or any sudden or unexpected weight loss.       Nathalie Bashir has been seen at a pain clinic in the past in [place] 10 years ago, [facility] in 1990s and  [facility].        Current Treatments:  Amitriptyline 75mg q hs - she ran out of this medication for sleep.  Not helpful for neuropathic pain.  Celexa 20mg 1.5 tabs daily 30mg daily  Compounding medication  Cyclobenzaprine 10 mg TID PRN  Hydroxyzine 25mg 1-2 tabs QID anxiety  Zofran 8mg prn nausea with migraines  Oxycodone 5mg TID - since 2020 with palliative care  Stool softeners PRN  Imitrex 100mg PRN HA     NOTE:  allergies to naprosyn, methadone, latex, ketorlac, gabapentin, acetaminophen, topiramate, pregabalin and oxycontin     Previous Medication Treatments Included:  Anti-convulsants: Gabapentin, pregabalin had negative SE  Muscle relaxors: Tizanidine not helpful  Anti-depressants: amitriptyline was not helpful for pain, duloxetine had negative SE with mood  Benzodiazapine's: none  Acetaminophen/NSAIDs: negative SE  Topicals: Voltaren gel 1%  Headache abortives: imitrex  Headache prophylactics:   Opioids: MS ER 30mg BID, Oxycodone 10mg BID at previous pain - [facility] in 1990, buprenorphine caused negative SE        Other Treatments Have Included:  Physical therapy: yes over 2 years ago  Pain Psychology: no  Chiropractic: yes - not helpful  Acupuncture: yes - not helpful  TENs Unit: yes she has one  Injections: 09/27/2023 lumbar MBB #1  Surgeries: L) hip replacement 2015, L) SI fusion 2008  Dry Needling: no  Massage:no     Past Medical History:  Medical history reviewed.  Past Medical History        Past Medical History:   Diagnosis Date    Allergic rhinitis      Cancer (H)       stomach cancer    Chronic pain      Closed fracture of distal end of left radius      Depression      Gastric adenocarcinoma (H)      Lumbago      Migraine      Opioid  dependence (H)      Other chronic pain       low back    Sacroiliitis (H)       low back pain    Trochanteric bursitis       leg length discrrepancy, hip pain             Patient Active Problem List   Diagnosis    Lower limb length difference    IPMN (intraductal papillary mucinous neoplasm)    Allergic rhinitis    Arthralgia of hip    Bursitis    Chronic neck pain    Chronic pain disorder    Continuous opioid dependence (H)    Controlled substance agreement signed    Debility    Degeneration of intervertebral disc of lumbosacral region    Depression    Disorder of sacrum    Greater trochanteric bursitis of left hip    Infected prosthesis of left hip (H)    Chronic bilateral low back pain without sciatica    MDD (major depressive disorder), recurrent, in partial remission (H)    Migraine with aura    Sacroiliitis (H)    Screening for malignant neoplasm of cervix    Spondylosis of lumbosacral spine without myelopathy    Status post left hip replacement    Cervical radiculopathy, chronic    Unilateral inguinal hernia without obstruction or gangrene    Long term (current) use of opiate analgesic    Low urine output    Hypophosphatemia    Leukocytosis    Intra-abdominal fluid collection    Bacteremia due to Gram-negative bacteria    Malnutrition (H)    Gastric adenocarcinoma (H)    Pneumonia    Mucositis    Chemotherapy-induced neutropenia (H)    Gastric cancer (H)    Cholangitis (H)    Thrombocytopenia (H)    Urinary tract infection    Crohn's disease of small intestine (H)    Jejunitis    Colitis    Abdominal pain    Malignant neoplasm of overlapping sites of stomach (H)    Iron deficiency anemia    Anemia    Paralytic ileus (H)    Hypokalemia    Nausea & vomiting    Dysphagia    History of gastric surgery    Chemotherapy-induced neuropathy (H)    Bilateral hand pain    Pyelonephritis    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  Past Surgical History         Past Surgical History:   Procedure Laterality Date    ARTHROPLASTY HIP Left 2016    BACK SURGERY         L4-5 decompression    CATARACT IOL, RT/LT        CHOLECYSTECTOMY N/A 1/28/2020     Procedure: Cholecystectomy;  Surgeon: Yandel Mallory MD;  Location: [facility] OR    ENDOSCOPIC RETROGRADE CHOLANGIOPANCREATOGRAM N/A 7/27/2020     Procedure: ENDOSCOPIC RETROGRADE CHOLANGIOPANCREATOGRAPHY  **Latex Allergy** with jejunal biopsies;  Surgeon: Jeet Aviles MD;  Location: [facility] OR    ESOPHAGOSCOPY, GASTROSCOPY, DUODENOSCOPY (EGD), COMBINED N/A 3/3/2020     Procedure: ESOPHAGOGASTRODUODENOSCOPY, WITH ENDOSCOPIC US (enoxaparin);  Surgeon: Bakari Plata MD;  Location:  GI    ESOPHAGOSCOPY, GASTROSCOPY, DUODENOSCOPY (EGD), COMBINED N/A 12/7/2020     Procedure: Upper Endoscopy with stent placement;  Surgeon: Jeet Aviles MD;  Location:  OR    ESOPHAGOSCOPY, GASTROSCOPY, DUODENOSCOPY (EGD), COMBINED N/A 2/8/2021     Procedure: ESOPHAGOGASTRODUODENOSCOPY (EGD)AND STENT REMOVAL;  Surgeon: Jeet Aviles MD;  Location:  OR    EYE SURGERY        GASTRECTOMY N/A 10/21/2020     Procedure: Open subtotal gastectomy with David en Y Reconstruction; D1 Lymph Node Disection  **Latex Allergy**;  Surgeon: Yandel Mallory MD;  Location: [facility] OR    INJECT BLOCK MEDIAL BRANCH CERVICAL/THORACIC/LUMBAR Bilateral 9/27/2023     Procedure: BILATERAL L4-5, L5-S1 medial branch block #1;  Surgeon: Maria Victoria Urbina MD;  Location: [facility] OR    IR CHEST PORT PLACEMENT > 5 YRS OF AGE   3/26/2020    IR LUMBAR EPIDURAL STEROID INJECTION   8/25/2009    IR LUMBAR EPIDURAL STEROID INJECTION   9/2/2009    IR LUMBAR EPIDURAL STEROID INJECTION   9/25/2009    IR LUMBAR EPIDURAL STEROID INJECTION   11/23/2009    IR LUMBAR EPIDURAL STEROID  INJECTION   4/1/2010    IR LUMBAR EPIDURAL STEROID INJECTION   9/1/2010    IR LUMBAR EPIDURAL STEROID INJECTION   3/10/2011    IR LUMBAR EPIDURAL STEROID INJECTION   8/30/2011    IR PICC PLACEMENT > 5 YRS OF AGE   10/12/2016    IR PORT REMOVAL RIGHT   12/8/2023    LAPAROSCOPY DIAGNOSTIC (GENERAL) N/A 10/13/2020     Procedure: Diagnostic laparoscopy with peritoneal washings  **Latex Allergy**;  Surgeon: Yandel Mallory MD;  Location: [facility] OR    OPEN REDUCTION INTERNAL FIXATION RODDING INTRAMEDULLARY FEMUR Left 12/23/2014     Procedure: OPEN REDUCTION INTERNAL FIXATION RODDING INTRAMEDULLARY FEMUR;  Surgeon: Arnol Santiago MD;  [facility]    OPEN REDUCTION INTERNAL FIXATION WRIST Left 9/22/2022     Procedure: LEFT WRIST OPEN REDUCTION INTERNAL FIXATION, FRACTURE;  Surgeon: Mark Villalobos MD;  [place] [facility] [place]    ORTHOPEDIC SURGERY        PICC DOUBLE LUMEN PLACEMENT Right 02/07/2020     5 fr double lumen 41 cm    WA LAMINEC/FACETECT/FORAMIN,LUMBAR 1 SEG         Description: Laminectomy Decompress, Facetectomy, Foraminotomy Lumbar Seg;  Recorded: 04/12/2012;    REMOVE HARDWARE LOWER EXTREMITY Left 4/7/2015     Procedure: REMOVE HARDWARE LOWER EXTREMITY;  Surgeon: Arnol Santiago MD;  [facility]    REMOVE HARDWARE RODDING INTRAMEDULLARY FEMUR Left 12/17/2015     Procedure: REMOVE HARDWARE RODDING INTRAMEDULLARY FEMUR;  Surgeon: Arnol Santiago MD;  [facility]    RESECT BONE LOWER EXTREMITY Left 12/23/2014     Procedure: RESECT BONE LOWER EXTREMITY;  Surgeon: Arnol Santiago MD;  [facility]    SHORTENING OSTEOPLASTY FEMUR W/ IM NAILING        WHIPPLE PROCEDURE N/A 1/28/2020     Procedure: Open Whipple procedure and Cholecystectomy;  Surgeon: Yandel Mallory MD;  [facility]    WHIPPLE PROCEDURE        ZZC FUSION OF SACROILIAC JOINT         Description: Arthrodesis Sacroiliac Joint Left;  Recorded: 10/07/2011;    ZZC REPAIR DETACH RETINA,SCLERAL BUCKLE                Medications: Pertinent  medications reviewed.  Current Outpatient Prescriptions          Current Outpatient Medications   Medication Sig Dispense Refill    amitriptyline (ELAVIL) 75 MG tablet Take 1 tablet (75 mg) by mouth at bedtime 30 tablet 3    ciprofloxacin (CIPRO) 500 MG tablet Take 1 tablet (500 mg) by mouth 2 times daily. (Patient not taking: Reported on 12/17/2024) 10 tablet 0    citalopram (CELEXA) 20 MG tablet Take 1.5 tablets (30 mg) by mouth daily 135 tablet 1    COMPOUNDED NON-CONTROLLED SUBSTANCE (CMPD RX) - PHARMACY TO MIX COMPOUNDED MEDICATION Estriol 1 mg/gram,place 1 gram vaginally three times per week,42.5 grams (Patient not taking: Reported on 12/17/2024) 1 each 3    Cyanocobalamin 1000 MCG CAPS Take 1 tablet by mouth every morning        cyclobenzaprine (FLEXERIL) 10 MG tablet TAKE 1 TABLET BY MOUTH 3 TIMES DAILY AS NEEDED FOR MUSCLE SPASMS 90 tablet 3    famotidine (PEPCID) 20 MG tablet TAKE 1 TABLET (20 MG) BY MOUTH 2 TIMES DAILY AS NEEDED 180 tablet 1    fluticasone (FLONASE) 50 MCG/ACT nasal spray Spray 1 spray into both nostrils daily at 2 pm        loperamide (IMODIUM) 2 MG capsule Take 4 mg by mouth 4 times daily as needed for diarrhea (Patient not taking: Reported on 12/17/2024)        loratadine-pseudoePHEDrine (CLARITIN-D 24-HOUR)  MG 24 hr tablet Take 1 tablet by mouth daily as needed for allergies Usually takes in Spring/Fall        molnupiravir (LAGEVRIO) 200 MG capsule Take 4 capsules (800 mg) by mouth every 12 hours 40 each 0    ondansetron (ZOFRAN) 8 MG tablet Take 1 tablet (8 mg) by mouth every 8 hours as needed (Nausea/Vomiting) 90 tablet 2    oxyCODONE (ROXICODONE) 5 MG tablet Take 1 tablet (5 mg) by mouth 3 times daily as needed for breakthrough pain or moderate to severe pain. Maximum 3 pills/day. 90 tablet 0    pantoprazole (PROTONIX) 40 MG EC tablet TAKE 1 TABLET BY MOUTH 2 TIMES DAILY 180 tablet 1    sennosides (SENOKOT) 8.6 MG tablet 1-2 tablets 1-2 times daily as needed. 120 tablet 1     simethicone 80 MG TABS Take 1 tablet by mouth every 6 hours as needed (bloating, gas, belching) 90 tablet 3    SUMAtriptan (IMITREX) 100 MG tablet Take 100 mg by mouth at onset of headache for migraine                [place] Prescription Monitoring Program reviewed 3/25/2025.  No concern for abuse or misuse of controlled medications based on this report.   3/18/2025 Oxycodone 5mg 90 tabs for 30days  2/18/2025 Oxycodone 5mg 90 tabs for 30days  1/21/2024 Oxycodone 5mg 90 tabs for 30days\  12/24/2025 Oxycodone 5mg 90 tabs for 30days  11/26/2024 Hydrocodone/act 5/325mg 90 tabs for 30 days  10/29/2024 Hydrocodone/act 5/325mg 90 tabs for 30 days  10/16/2024 Oxycodone 5mg 90 tabs for 30 days  09/18/2024 Oxycodone 5mg 90 tabs for 30 days  08/21/2024 Oxycodone 5mg 90 tabs for 30 days  07/24/2024 Oxycodone 5mg 90 tabs for 30 days  06/24/2024 Hydrocodone-acet 5/325mg 75 tabs for 25 ays  06/06/2024 Oxycodone 5mg 90 tabs for 30 days  05/07/2024 Oxycodone 5mg 90 tabs for 30 days  04/07/2024 Oxycodone 5mg 90 tabs for 30 days  Monthly scripts for oxycodone in 2024 no other medications listed.        THE 4 A's OF OPIOID MAINTENANCE ANALGESIA     Analgesia: Patient reports decrease pain on current pain medications and doses at a tolerable level.     Activity: She is independent in ADL's and report improved function with pain regimen     Adverse effects: She denies any significant negative side effects from her pain medications.     Adherence to Rx protocol: Patient is taking her medications as prescribed with no signs of misuse or abuse.        Allergies: Pertinent allergies reviewed.     Allergies         Allergies   Allergen Reactions    Gluten Meal Palpitations    Amoxicillin-Pot Clavulanate Rash       Patient tolerated Zosyn in 7/2020    Oxycontin [Oxycodone] Other (See Comments)       Confusion, loopy, oxycodone is tolerated.    Pt states she tolerates regular oxycodone, cannot take 12 hour oxycontin.      Pregabalin          interfered with Cymbalta    Topiramate         Tongue numbness, taste alteration, irritable     Acetaminophen Nausea       Urine retention       Dust Mite Extract      Gabapentin Dizziness and GI Disturbance    Ketorolac Headache    Latex Rash    Methadone Fatigue    Mold      Naprosyn [Naproxen] Dizziness and GI Disturbance    Seasonal Allergies              Family History:   family history includes Dementia in her father; Heart Failure (age of onset: 77) in her mother; No Known Problems in her brother and sister.     Social History:   She is  and  lives in a house.  She has 1 grown child.  She is on SSD.  She is independent in ADL's.  She has 4 grand children who live in [place].  She  reports that she quit smoking about 4 years ago. Her smoking use included cigarettes. She started smoking about 30 years ago. She has a 12.5 pack-year smoking history. She has been exposed to tobacco smoke. She has never used smokeless tobacco. She reports that she does not drink alcohol and does not use drugs.    Social History          Social History Narrative    Not on file         Physical Exam:  /67 (BP Location: Right arm)   Pulse 86   LMP 09/23/2014   SpO2 99%      Constitutional: She is oriented to person, place, and time.  She appears well-developed and well-nourished. She is not in acute distress.   HENT:     Head: Normocephalic and atraumatic.     Eyes: Pupils are equal, round, and reactive to light. EOM are normal. No scleral icterus.   Pulmonary/Chest:  NWOB. No respiratory distress.   Neurological: She is alert and oriented to person, place, and time. Coordination grossly normal.    Skin: Skin is warm and dry. She is not diaphoretic.   Psychiatric: She has a normal mood and affect. Her behavior is normal. Judgment and thought content normal.  Patient answers questions appropriately.  MSK: Gait is antalgic.          Narrative & Impression   EXAMINATION: CT CHEST/ABDOMEN/PELVIS W CONTRAST, 11/13/2023  12:03 PM     TECHNIQUE:  Helical CT images from the thoracic inlet through the  symphysis pubis were obtained with IV contrast. Contrast dose: Isovue  370 88cc     COMPARISON: CT abdomen and pelvis dated 8/30/2023, CT chest abdomen  pelvis dated 5/17/2023.     HISTORY: Malignant neoplasm of pyloric antrum (H)     FINDINGS:  CHEST:  LUNGS: Central tracheobronchial tree is patent. No pneumothorax or  pleural effusion. No focal airspace opacity. No suspicious pulmonary  nodule. Unchanged subcentimeter nodule along the left fissure likely  represents an intrafissural lymph node. Stable few sub-4 mm pulmonary  nodules are likely benign.      MEDIASTINUM: Right port catheter terminates in distal SVC. Heart size  is within normal limits. No pericardial effusion. Ascending aorta and  main pulmonary artery diameters are within normal limits. Normal  appearance and configuration of the great vessels off of the aortic  arch. No suspicious mediastinal, hilar, or axillary lymph nodes.      Visualized thyroid and esophagus are unremarkable.     ABDOMEN/PELVIS:  LIVER: No suspicious focal hepatic lesion.     BILIARY: Gallbladder is surgically absent. Unchanged mild pneumobilia.  No intrahepatic or extrahepatic biliary ductal dilation.     PANCREAS: Post Whipple's procedure. No focal pancreatic lesion. No  pancreatic duct dilatation.     SPLEEN: Within normal limits.     ADRENAL GLANDS: No focal adrenal nodule.     URINARY TRACT: No suspicious renal lesion. No hydronephrosis or  hydroureter. No renal stone. Urinary bladder is unremarkable.     REPRODUCTIVE ORGANS: Within normal limits.     BOWEL: Partial gastrectomy and gastrojejunostomy. Extensive colonic  diverticulosis. Normal caliber large and small bowel. No abnormal  bowel wall thickening or enhancement. Appendix is unremarkable.     PERITONEUM/FLUID: No ascites or pelvic free fluid.     VESSELS: No aneurysmal dilatation of the abdominal aorta.  The portal,  splenic, and  superior mesenteric veins are patent.  The origins of the  celiac and superior mesenteric arteries are patent.     LYMPH NODES: No lymphadenopathy.     BONES/SOFT TISSUES: No aggressive osseous lesions. Degenerative  changes in the spine. L1 hemangioma. Few chronic right rib fractures.  Stable small circumscribed lucent focus in the sternum is likely  benign. Left hip arthroplasty. Left SI joint fixation hardware.  Changes of right inguinal hernia repair.                                                                       IMPRESSION:   No evidence of metastatic disease in the chest, abdomen, or pelvis.     CHERYL SOLIZ MD             EXAM: MR SHOULDER RIGHT WITHOUT CONTRAST  LOCATION: [facility]  DATE: 11/18/2023     INDICATION: Shoulder pain.  COMPARISON: None.  TECHNIQUE: Unenhanced.     FINDINGS:     ROTATOR CUFF:  -Supraspinatus: Mild tendinosis without tearing. Normal muscle without strain or atrophy.  -Infraspinatus: No tendon tear, tendinopathy or fatty atrophy.  -Subscapularis: Mild tendinosis without tear or tendon thinning. Normal muscle without edema or atrophy.  -Teres minor: Normal tendon and muscle.     CORACOACROMIAL ARCH:  -Morphology: Developmental variant os acromiale. Small amount of fluid at the pseudoarticulation. No significant inflammatory changes or stress reaction. Type I acromial morphology. No subacromial spur. Subacromial and subcoracoid space are normal.   -Bursa: Mild subacromial/subdeltoid bursitis.     ACROMIOCLAVICULAR JOINT:   -Normal alignment. Minimal degenerative arthrosis.      LONG HEAD OF BICEPS TENDON:   -Small tenosynovial effusion. Normal tendon without tear or tendinosis.     GLENOHUMERAL JOINT:   -Labrum: Degenerative surface fraying and blunting of the labrum. No full-thickness tear/avulsion.   -Cartilage: Smooth high-grade cartilage loss, with full-thickness thinning over the glenoid, and mixed high-grade partial-thickness and  full-thickness thinning over the humeral head.  -Joint space: Minimally increased joint fluid. Mild synovitis. No loose intra-articular bodies.  -Glenohumeral ligaments and capsule: No pericapsular inflammation.     BONES:   -Negative for fracture, AVN, or bone marrow replacement lesion.  -End-stage degenerative arthritic changes, with marginal osteophytes.     SOFT TISSUES:   -Normal deltoid muscle bulk. Normal visualized chest wall and axilla.                                                                      IMPRESSION:  1.  Advanced osteoarthrosis, with high-grade cartilage loss, including areas of exposed subchondral bone over the glenoid and humeral head. Large marginal osteophytes.     2.  Minimally increased glenohumeral joint fluid. Mild synovitis. No loose bodies.     3.  Intact rotator cuff without full-thickness or partial-thickness tear. Mild supraspinatus and subscapularis tendinosis.     4.  Mild subacromial/subdeltoid bursitis.     5.  Small biceps tenosynovial effusion. No tendon tearing or significant tendinosis.     6.  Incidentally identified os acromiale developmental variant morphology.        Narrative & Impression   EXAM: MR LUMBAR SPINE W/O CONTRAST  LOCATION: [facility]  DATE: 9/3/2023     INDICATION: History of lumbar lami (+10yrs), primarily axial LBP>BLE radicular pain.  COMPARISON: Plain film imaging 08/29/2023 of the lumbar spine, prior body CT 08/30/2023 also reviewed.  TECHNIQUE: Routine Lumbar Spine MRI without IV contrast.     FINDINGS:   Nomenclature is based on 5 lumbar type vertebral bodies. Rudimentary 12th ribs are identified. Partial sacralization left L5. Correlate with plain film imaging if there is future plan for intervention. Satisfactory lumbar vertebral body height. 2 mm   degenerative anterior subluxation L3-L4 as on prior body CT. No high-grade subluxations. No marrow or ligamentous edema. Nothing for sacral insufficiency or stress  fracture. Nothing for a marrow-infiltrative process. Benign vertebral body hemangioma at   L1. Interspace narrowing lower thoracic level and at L3-L4. Congenital narrowing of the L5-S1 interspace. Left SI joint space fusion hardware with degenerative changes right SI joint. Sacral neural foramina are intact. Normal distal spinal cord and cauda   equina with conus medullaris at L1. Rightward lumbar curve. Nerve roots of the cauda equina are satisfactory without nodularity thickening. No retroperitoneal solid mass or adenopathy. Symmetric psoas appearance. Normal caliber abdominal aorta.   Satisfactory renal contours.     T12-L1: Moderate loss of disc height with annular bulge. No disc herniation, spinal stenosis or foraminal narrowing is present. Facet joints are satisfactory.      L1-L2: Normal disc height and signal. Mild annular bulge. No herniation. Normal facets. No spinal canal or neural foraminal stenosis.     L2-L3: Mild loss of disc height with annular bulge. No disc herniation. No spinal stenosis. Mild foraminal narrowing bilaterally. Mild facet joint hypertrophic changes are present.      L3-L4: Mild loss of disc height. Low-grade degenerative anterior subluxation. Generalized disc bulge. No disc herniation. Mild foraminal narrowing inferiorly. No spinal stenosis. Mild facet joint arthropathy bilaterally.     L4-L5: Normal disc height and signal. Annular bulge. No herniation. Normal facets. No spinal canal or right neural foraminal stenosis. Mild left foraminal narrowing.     L5-S1: Partial sacralization on the left. Interspace narrowing on a congenital/developmental basis with disc desiccation. No disc herniation. No spinal stenosis or foraminal narrowing.                                                                      IMPRESSION:  1.  See above for counting nomenclature, stable from prior body CT examination 08/30/2023 with partial sacralization left L5. Correlate with plain film imaging if there is  future plan for intervention.     2.  Low-grade degenerative anterior subluxation L3 upon L4. No high-grade subluxations. No marrow or ligamentous edema.     3.  No evidence for severe spinal stenosis or severe foraminal compromise at any lumbar level.     4.  See above for additional details and full level by level description.            EXAM: XR SACRUM/COCCYX   [facility]   DATE/TIME: 6/14/2021 10:39 AM     INDICATION: Tailbone pain for a year, history of adenocarcinoma of the stomach   COMPARISON: None.     IMPRESSION: Postsurgical change with cage at the left SI joint. Left hip arthroplasty. No fracture or malalignment. No osseous lesion. L5-S1 degenerative disc changes with facet arthrosis. Right hip is intact.      EXAM: XR SHOULDER RIGHT G/E 3 VIEWS  LOCATION: [facility]  DATE/TIME: 9/18/2022 12:10 PM     INDICATION: Pain following fall.  COMPARISON: None.                                                                      IMPRESSION: No fractures are identified. Normal glenohumeral alignment. Mild degenerative changes in the glenohumeral joint. The acromioclavicular joint is unremarkable. Right-sided port catheter.        Narrative & Impression   CT CERVICAL SPINE W/O CONTRAST 9/18/2022 12:14 PM     Provided History: neck pain post fall     Comparison: CT chest and pelvis 11/15/2021     Technique: Using multidetector thin collimation helical acquisition  technique, axial, coronal and sagittal CT images through the cervical  spine were obtained without intravenous contrast.      Findings:     The cervical vertebrae are normally aligned. Straightening of the  cervical curvature.      No acute fracture or traumatic subluxation. No prevertebral edema.      There is multilevel disc height narrowing throughout. Multilevel  cervical spondylosis with disc osteophyte complexes, uncinate  spurring, and facet arthropathy. Mild spinal canal narrowing at  C4-5  and C5-6. Mild to moderate bilateral neural foraminal narrowing at  C4-5, mild to moderate right and mild left neural stenosis at C5-6.     No abnormality of the paraspinous soft tissues.                                                                      Impression:   1. No acute fracture or traumatic subluxation.  2. Multilevel cervical spondylosis, with minimal spinal canal  narrowing at C4-C5 and C5-C6.     I have personally reviewed the examination and initial interpretation  and I agree with the findings.         EMG:  na        Diagnosis:  (M25.511,  G89.29) Chronic right shoulder pain  (primary encounter diagnosis)  Comment:   Plan:      (M54.12) Cervical radiculopathy  Comment:   Plan:      (G89.29) Chronic intractable pain  Comment:   Plan:      (F11.90) Chronic, continuous use of opioids  Comment:   Plan:         Plan on 3/25/2025:  No refills needed today.    Follow-up in clinic in 3 months.    UDS next appt.       Desi Albright, APRN, CNP  [facility]           BILLING TIME DOCUMENTATION:   The total TIME spent on this patient on the date of the encounter/appointment was  minutes.

## 2025-03-25 ENCOUNTER — OFFICE VISIT (OUTPATIENT)
Dept: PALLIATIVE MEDICINE | Facility: OTHER | Age: 60
End: 2025-03-25
Attending: NURSE PRACTITIONER
Payer: MEDICARE

## 2025-03-25 VITALS — SYSTOLIC BLOOD PRESSURE: 115 MMHG | OXYGEN SATURATION: 99 % | HEART RATE: 86 BPM | DIASTOLIC BLOOD PRESSURE: 67 MMHG

## 2025-03-25 DIAGNOSIS — F11.90 CHRONIC, CONTINUOUS USE OF OPIOIDS: ICD-10-CM

## 2025-03-25 DIAGNOSIS — G89.29 CHRONIC INTRACTABLE PAIN: ICD-10-CM

## 2025-03-25 DIAGNOSIS — M54.2 CHRONIC NECK PAIN: ICD-10-CM

## 2025-03-25 DIAGNOSIS — M47.817 SPONDYLOSIS OF LUMBOSACRAL SPINE WITHOUT MYELOPATHY: Primary | ICD-10-CM

## 2025-03-25 DIAGNOSIS — G89.29 CHRONIC NECK PAIN: ICD-10-CM

## 2025-03-25 PROCEDURE — G0463 HOSPITAL OUTPT CLINIC VISIT: HCPCS | Performed by: NURSE PRACTITIONER

## 2025-03-25 ASSESSMENT — ANXIETY QUESTIONNAIRES
3. WORRYING TOO MUCH ABOUT DIFFERENT THINGS: NOT AT ALL
IF YOU CHECKED OFF ANY PROBLEMS ON THIS QUESTIONNAIRE, HOW DIFFICULT HAVE THESE PROBLEMS MADE IT FOR YOU TO DO YOUR WORK, TAKE CARE OF THINGS AT HOME, OR GET ALONG WITH OTHER PEOPLE: NOT DIFFICULT AT ALL
7. FEELING AFRAID AS IF SOMETHING AWFUL MIGHT HAPPEN: NOT AT ALL
4. TROUBLE RELAXING: NOT AT ALL
5. BEING SO RESTLESS THAT IT IS HARD TO SIT STILL: SEVERAL DAYS
GAD7 TOTAL SCORE: 1
1. FEELING NERVOUS, ANXIOUS, OR ON EDGE: NOT AT ALL
2. NOT BEING ABLE TO STOP OR CONTROL WORRYING: NOT AT ALL
GAD7 TOTAL SCORE: 1
6. BECOMING EASILY ANNOYED OR IRRITABLE: NOT AT ALL

## 2025-03-25 ASSESSMENT — PAIN SCALES - GENERAL: PAINLEVEL_OUTOF10: MODERATE PAIN (5)

## 2025-03-25 NOTE — PATIENT INSTRUCTIONS
Plan on 3/25/2025:  No refills needed today.    Follow-up in clinic in 3 months.       NINA Carey, CNP  [place] Number:  [phone]  Call with any questions about your care and for scheduling assistance.   Calls are returned Monday through Friday between 8 AM and 4:30 PM. We usually get back to you within 2 business days depending on the issue/request.    If we are prescribing your medications:  For opioid medication refills, call the clinic or send a [employer] message 7 days in advance.  Please include:  Name of requested medication  Name of the pharmacy.  For non-opioid medications, call your pharmacy directly to request a refill. Please allow 3-4 days to be processed.   Per [place] State Law:  All controlled substance prescriptions must be filled within 30 days of being written.    For those controlled substances allowing refills, pickup must occur within 30 days of last fill.      We believe regular attendance is key to your success in our program!    Any time you are unable to keep your appointment we ask that you call us at least 24 hours in advance to cancel.This will allow us to offer the appointment time to another patient.   Multiple missed appointments may lead to dismissal from the clinic.

## 2025-03-31 ENCOUNTER — LAB (OUTPATIENT)
Dept: LAB | Facility: HOSPITAL | Age: 60
End: 2025-03-31
Payer: MEDICARE

## 2025-03-31 DIAGNOSIS — R39.89 SUSPECTED UTI: ICD-10-CM

## 2025-03-31 DIAGNOSIS — R39.89 SUSPECTED UTI: Primary | ICD-10-CM

## 2025-03-31 LAB
ALBUMIN UR-MCNC: 30 MG/DL
APPEARANCE UR: ABNORMAL
BACTERIA #/AREA URNS HPF: ABNORMAL /HPF
BILIRUB UR QL STRIP: NEGATIVE
COLOR UR AUTO: YELLOW
GLUCOSE UR STRIP-MCNC: NEGATIVE MG/DL
HGB UR QL STRIP: ABNORMAL
HYALINE CASTS: 7 /LPF
KETONES UR STRIP-MCNC: ABNORMAL MG/DL
LEUKOCYTE ESTERASE UR QL STRIP: ABNORMAL
MUCOUS THREADS #/AREA URNS LPF: PRESENT /LPF
NITRATE UR QL: POSITIVE
PH UR STRIP: 6.5 [PH] (ref 5–7)
RBC URINE: 6 /HPF
SP GR UR STRIP: 1.03 (ref 1–1.03)
SQUAMOUS EPITHELIAL: 7 /HPF
UROBILINOGEN UR STRIP-MCNC: 3 MG/DL
WBC URINE: 84 /HPF

## 2025-03-31 PROCEDURE — 81001 URINALYSIS AUTO W/SCOPE: CPT

## 2025-03-31 PROCEDURE — 87088 URINE BACTERIA CULTURE: CPT

## 2025-03-31 PROCEDURE — 87186 SC STD MICRODIL/AGAR DIL: CPT

## 2025-03-31 NOTE — PROGRESS NOTES
See TE from today including pt's urinary sx. UA/UC ordered.      Kevin NGUYEN RN  Urology Triage

## 2025-04-01 LAB — BACTERIA UR CULT: ABNORMAL

## 2025-04-02 ENCOUNTER — DOCUMENTATION ONLY (OUTPATIENT)
Dept: UROLOGY | Facility: CLINIC | Age: 60
End: 2025-04-02
Payer: MEDICARE

## 2025-04-02 DIAGNOSIS — N39.0 URINARY TRACT INFECTION WITHOUT HEMATURIA, SITE UNSPECIFIED: Primary | ICD-10-CM

## 2025-04-02 RX ORDER — NITROFURANTOIN 25; 75 MG/1; MG/1
100 CAPSULE ORAL 2 TIMES DAILY
Qty: 14 CAPSULE | Refills: 0 | Status: SHIPPED | OUTPATIENT
Start: 2025-04-02

## 2025-04-02 NOTE — PROGRESS NOTES
Called to Nathalie and told her an abx course was sent to her pharmacy for a UC+. Pt will pickup and start the course today.       Kevin NGUYEN RN  Urology Triage

## 2025-04-22 ENCOUNTER — TELEPHONE (OUTPATIENT)
Dept: PALLIATIVE MEDICINE | Facility: OTHER | Age: 60
End: 2025-04-22
Payer: MEDICARE

## 2025-04-23 ENCOUNTER — RADIOLOGY INJECTION OFFICE VISIT (OUTPATIENT)
Dept: PALLIATIVE MEDICINE | Facility: OTHER | Age: 60
End: 2025-04-23
Attending: STUDENT IN AN ORGANIZED HEALTH CARE EDUCATION/TRAINING PROGRAM
Payer: MEDICARE

## 2025-04-23 VITALS — OXYGEN SATURATION: 99 % | DIASTOLIC BLOOD PRESSURE: 61 MMHG | HEART RATE: 81 BPM | SYSTOLIC BLOOD PRESSURE: 116 MMHG

## 2025-04-23 DIAGNOSIS — M47.817 SPONDYLOSIS OF LUMBOSACRAL REGION WITHOUT MYELOPATHY OR RADICULOPATHY: ICD-10-CM

## 2025-04-23 PROCEDURE — 250N000011 HC RX IP 250 OP 636: Mod: JZ | Performed by: STUDENT IN AN ORGANIZED HEALTH CARE EDUCATION/TRAINING PROGRAM

## 2025-04-23 PROCEDURE — 64636 DESTROY L/S FACET JNT ADDL: CPT | Mod: 50 | Performed by: STUDENT IN AN ORGANIZED HEALTH CARE EDUCATION/TRAINING PROGRAM

## 2025-04-23 PROCEDURE — 64635 DESTROY LUMB/SAC FACET JNT: CPT | Mod: 50 | Performed by: STUDENT IN AN ORGANIZED HEALTH CARE EDUCATION/TRAINING PROGRAM

## 2025-04-23 RX ADMIN — LIDOCAINE HYDROCHLORIDE 5 ML: 20 INJECTION, SOLUTION INTRAVENOUS at 16:05

## 2025-04-23 ASSESSMENT — PAIN SCALES - GENERAL
PAINLEVEL_OUTOF10: MILD PAIN (3)
PAINLEVEL_OUTOF10: MODERATE PAIN (5)

## 2025-04-23 NOTE — NURSING NOTE
Discharge Information    IV Discontiued Time:  NA    Amount of Fluid Infused:  NA    Discharge Criteria = When patient returns to baseline or as per MD order    Consciousness:  Pt is fully awake    Circulation:  BP +/- 20% of pre-procedure level    Respiration:  Patient is able to breathe deeply    O2 Sat:  Patient is able to maintain O2 Sat >92% on room air    Activity:  Moves 4 extremities on command    Ambulation:  Patient is able to stand and walk or stand and pivot into wheelchair    Dressing:  Clean/dry or No Dressing    Notes:   Discharge instructions and AVS given to patient    Patient meets criteria for discharge?  YES    Admitted to PCU?  No    Responsible adult present to accompany patient home?  Yes    Signature/Title:    Kady Delatorre RN  RN Care Coordinator  [facility] [place]

## 2025-04-23 NOTE — PATIENT INSTRUCTIONS
Today you saw:  Dr. Gallegos        You should anticipate procedural pain for up to 2 weeks.          It may take up to 8 weeks to receive relief from the RFA          Follow up with your provider in 8 weeks.          You may resume your regular medications after the procedure          If you were holding your blood thinning medication, please restart taking it: N/A          Be cautious with walking. Numbness and/or weakness in the lower extremities may occur for up to 6-8 hours due to effect of local anesthetic          Avoid strenuous activity for the first 24 hours          You may resume your regular activities after 24 hours          You may shower, however no swimming, tub baths or hot tubs for 24 hours following your procedure          You may use ice packs 10-15 minutes three to four times a day at the injection site for comfort          Do not use heat to painful areas for 6 to 8 hours. This will give the local anesthetic time to wear off and prevent you from accidentally burning your skin.          Unless you have been directed to avoid the use of anti-inflammatory medications (NSAIDS), you may use medications such as ibuprofen, Aleve or Tylenol for pain control if needed.          If you experience any of the following, call the Pain Clinic at [phone]:        -Fever over 100 degree F         -Swelling, bleeding, redness, drainage, warmth at the injection site         -Progressive weakness or numbness in your legs or arms         -Loss of bowel or bladder function         -Unusual headache that is not relieved by Tylenol         -Unusual new onset of pain that is not improving

## 2025-04-23 NOTE — PROGRESS NOTES
Pre procedure Diagnosis: lumbar spondylosis  Post procedure Diagnosis: Same  Procedure performed: Bilateral L3-5 lumbar radiofrequency ablation   Anesthesia: local  Complications: none  Operators: Remberto Gallegos MD; Glenn Stroud DO     Indications:   Nathalie Bashir is a 59 year old female was sent by Desi WOOD for medial branch radiofrequency ablation.  They have a history of lumbar spondylosis.    Nathalie Bashir had medial branch blocks showing appropriate pain relief, therefore radiofrequency ablation will be done.     Options/alternatives, benefits and risks were discussed with the patient including bleeding, infection, no pain relief, tissue trauma, exposure to radiation, reaction to medications including seizure, spinal cord injury,increased pain after the procedure, weakness, numbness or sensory changes and headache. Questions were answered to her satisfaction and she agrees to proceed. Voluntary informed consent was obtained and signed.     Vitals were reviewed: Yes  Allergies were reviewed:  Yes   Medications were reviewed:  Yes   Pre-procedure pain score: 5/10    Procedure:  After getting informed consent, patient was brought into the procedure suite and was placed in a prone position on the procedure table.   A Pause for the Cause was performed.  Patient was prepped and draped in sterile fashion.     Nathalie Bashir had an IV line placed prior the procedure.  In the AP view the sacral alar notch was identified. After anesthetizing the overlying skin and subcutaneous tissue with 1% lidocaine, a 100mm, 20G curved radiofrequency cannula with a 10mm active tip was placed at the sacral alar notch.  The C-arm was then positioned in the ipsilateral oblique view to afford optimal view of the L4-L5 vertebral bodies. Lidocaine 1% was used to anesthetize the skin and subcutaneous tissue at each level.  At each level, the above described cannula was positioned, overlying the intersection of the  transverse process and pedicle at L4 & L5, and was advanced under intermittent fluoroscopy until it contacted the transverse process and notch, and the tip slightly overran that process, just lateral to the mamillary process.  The position of each cannula was verified and optimized in the oblique view, AP view, and lateral view.  Each position was tested for motor stimulation, and was repositioned if necessary so that stimulation was negative for stimuli outside the immediate area of the desired lesion. Motor stimulation was completed at 2Hz, up to 1.5V.   Lidocaine 2% 1.0 ml was injected, and a 90 second, 80 degree Centigrade lesion was generated.  The needles were then rotated within the pathway of the medial branch, and locations were evaluated with repeat imaging.  A second lesion was then generated at each location.  The procedure was then repeated on the contralateral side, using the same technique as described above.    The needles were flushed with the local anesthetic as they were withdrawn.    Hemostasis was achieved, the area was cleaned, and bandaids were placed when appropriate.  The patient tolerated the procedure well, and was taken to the recovery room.    Images were saved to PACS.    Post-procedure pain score: 3/10  Follow-up includes:   -follow-up with referring provider    Patient was discussed with attending physician, Dr. Gallegos.     Glenn Stroud DO  Pain Fellow  [facility]    Physician Attestation   I, Remberto Gallegos MD, was present for all key and critical portions of the procedure, and I was immediately available during the remainder of the procedure.  Any changes to the documentation have been made above.    Remberto Gallegos MD  Interventional Pain Medicine  [facility]

## 2025-05-11 DIAGNOSIS — M79.2 NEUROPATHIC PAIN: ICD-10-CM

## 2025-05-12 ENCOUNTER — MYC REFILL (OUTPATIENT)
Dept: PALLIATIVE MEDICINE | Facility: OTHER | Age: 60
End: 2025-05-12
Payer: MEDICARE

## 2025-05-12 DIAGNOSIS — G89.29 CHRONIC INTRACTABLE PAIN: ICD-10-CM

## 2025-05-12 DIAGNOSIS — M54.12 CERVICAL RADICULOPATHY: ICD-10-CM

## 2025-05-12 DIAGNOSIS — M25.511 CHRONIC RIGHT SHOULDER PAIN: ICD-10-CM

## 2025-05-12 DIAGNOSIS — G89.29 CHRONIC RIGHT SHOULDER PAIN: ICD-10-CM

## 2025-05-12 RX ORDER — AMITRIPTYLINE HYDROCHLORIDE 75 MG/1
75 TABLET ORAL AT BEDTIME
Qty: 30 TABLET | Refills: 3 | Status: SHIPPED | OUTPATIENT
Start: 2025-05-12

## 2025-05-12 RX ORDER — OXYCODONE HYDROCHLORIDE 5 MG/1
5 TABLET ORAL 3 TIMES DAILY PRN
Qty: 90 TABLET | Refills: 0 | Status: SHIPPED | OUTPATIENT
Start: 2025-05-19

## 2025-05-12 NOTE — TELEPHONE ENCOUNTER
Pending Prescriptions:                       Disp   Refills    oxyCODONE (ROXICODONE) 5 MG tablet        90 tab*0            Sig: Take 1 tablet (5 mg) by mouth 3 times daily as           needed for severe pain. Fill 05/17; start 05/19    [facility] [place], [place] - [address] [facility]

## 2025-05-12 NOTE — TELEPHONE ENCOUNTER
Received fax from pharmacy requesting refill(s) for     amitriptyline (ELAVIL) 75 MG tablet    Date last filled 4/14/2025    Last Appt Date:3/25/2025    Next Appt scheduled: 6/24/2025    Pharmacy:     [id number] [place] [facility] [place] [address] [address]     Will route for processing    Sandra Reina [facility]

## 2025-05-12 NOTE — TELEPHONE ENCOUNTER
Please process a refill of AMITRIPTYLINE HCL 75 MG TAB  to     [id number] [place], [place] - [address]  [address] [address]  Phone: [phone] Fax: [phone]

## 2025-05-12 NOTE — TELEPHONE ENCOUNTER
Received request for a refill(s) of     oxyCODONE (ROXICODONE) 5 MG tablet        Last dispensed from pharmacy on 4/15/2025    Patient's last office/virtual visit by prescribing provider on 3/25/2025  Next office/virtual appointment scheduled for 6/24/2025    Last urine drug screen date 10/29/2024  Current opioid agreement on file (completed within the last year) YesDate of opioid agreement: 10/29/2024    E-prescribe to [facility] [address] [facility]  pharmacy    Will route to nursing [place] for review and preparation of prescription(s).

## 2025-05-27 ENCOUNTER — LAB (OUTPATIENT)
Dept: LAB | Facility: HOSPITAL | Age: 60
End: 2025-05-27
Payer: MEDICARE

## 2025-05-27 ENCOUNTER — TELEPHONE (OUTPATIENT)
Dept: UROLOGY | Facility: CLINIC | Age: 60
End: 2025-05-27
Payer: MEDICARE

## 2025-05-27 DIAGNOSIS — R39.9 UTI SYMPTOMS: Primary | ICD-10-CM

## 2025-05-27 DIAGNOSIS — R39.9 UTI SYMPTOMS: ICD-10-CM

## 2025-05-27 LAB
ALBUMIN UR-MCNC: NEGATIVE MG/DL
APPEARANCE UR: CLEAR
BACTERIA #/AREA URNS HPF: ABNORMAL /HPF
BILIRUB UR QL STRIP: NEGATIVE
COLOR UR AUTO: ABNORMAL
GLUCOSE UR STRIP-MCNC: NEGATIVE MG/DL
HGB UR QL STRIP: ABNORMAL
HYALINE CASTS: 1 /LPF
KETONES UR STRIP-MCNC: NEGATIVE MG/DL
LEUKOCYTE ESTERASE UR QL STRIP: ABNORMAL
NITRATE UR QL: POSITIVE
PH UR STRIP: 6.5 [PH] (ref 5–7)
RBC URINE: 2 /HPF
SP GR UR STRIP: 1.02 (ref 1–1.03)
SQUAMOUS EPITHELIAL: 1 /HPF
UROBILINOGEN UR STRIP-MCNC: NORMAL MG/DL
WBC URINE: 8 /HPF

## 2025-05-27 PROCEDURE — 81001 URINALYSIS AUTO W/SCOPE: CPT

## 2025-05-27 PROCEDURE — 87186 SC STD MICRODIL/AGAR DIL: CPT

## 2025-05-27 NOTE — TELEPHONE ENCOUNTER
[facility] Call Center    Phone Message    May a detailed message be left on voicemail: yes     Reason for Call: Other: Patient called to inform the care team she has bladder infection   . Please call patient to discuss    Action Taken: Message routed to:  [facility] uro    Travel Screening: Not Applicable     Date of Service:

## 2025-05-27 NOTE — TELEPHONE ENCOUNTER
Called to Nathalie, who describes cramplike feeling in her lower abdomen, back pain, discomfort immediately after voiding, and cloudiness to the urine that started a few days ago. She denies fever, chills, sweats, weakness, fatigue.   Told her a UA/UC will be ordered and we will contact her if tx is needed.       Kevin NGUYEN RN  Urology Triage

## 2025-05-29 ENCOUNTER — RESULTS FOLLOW-UP (OUTPATIENT)
Dept: UROLOGY | Facility: CLINIC | Age: 60
End: 2025-05-29

## 2025-05-29 DIAGNOSIS — N39.0 UTI (URINARY TRACT INFECTION): Primary | ICD-10-CM

## 2025-05-29 LAB — BACTERIA UR CULT: ABNORMAL

## 2025-05-29 RX ORDER — CIPROFLOXACIN 500 MG/1
500 TABLET, FILM COATED ORAL EVERY 12 HOURS
Qty: 14 TABLET | Refills: 0 | Status: SHIPPED | OUTPATIENT
Start: 2025-05-29 | End: 2025-06-05

## 2025-05-30 ENCOUNTER — HOSPITAL ENCOUNTER (EMERGENCY)
Facility: HOSPITAL | Age: 60
Discharge: HOME | End: 2025-05-30
Payer: MEDICARE

## 2025-05-30 ENCOUNTER — APPOINTMENT (OUTPATIENT)
Dept: CT IMAGING | Facility: HOSPITAL | Age: 60
End: 2025-05-30
Payer: MEDICARE

## 2025-05-30 VITALS
SYSTOLIC BLOOD PRESSURE: 103 MMHG | RESPIRATION RATE: 16 BRPM | OXYGEN SATURATION: 97 % | HEART RATE: 93 BPM | HEIGHT: 67 IN | WEIGHT: 159 LBS | TEMPERATURE: 98.4 F | BODY MASS INDEX: 24.96 KG/M2 | DIASTOLIC BLOOD PRESSURE: 71 MMHG

## 2025-05-30 DIAGNOSIS — N12 PYELONEPHRITIS: ICD-10-CM

## 2025-05-30 DIAGNOSIS — R10.9 LEFT FLANK PAIN: ICD-10-CM

## 2025-05-30 LAB
ANION GAP SERPL CALCULATED.3IONS-SCNC: 11 MMOL/L (ref 7–15)
BASOPHILS # BLD AUTO: 0 10E3/UL (ref 0–0.2)
BASOPHILS NFR BLD AUTO: 0 %
BUN SERPL-MCNC: 7.6 MG/DL (ref 6–20)
CALCIUM SERPL-MCNC: 9.1 MG/DL (ref 8.8–10.4)
CHLORIDE SERPL-SCNC: 101 MMOL/L (ref 98–107)
CREAT SERPL-MCNC: 0.89 MG/DL (ref 0.51–0.95)
EGFRCR SERPLBLD CKD-EPI 2021: 75 ML/MIN/1.73M2
EOSINOPHIL # BLD AUTO: 0 10E3/UL (ref 0–0.7)
EOSINOPHIL NFR BLD AUTO: 0 %
ERYTHROCYTE [DISTWIDTH] IN BLOOD BY AUTOMATED COUNT: 13.1 % (ref 10–15)
GLUCOSE SERPL-MCNC: 134 MG/DL (ref 70–99)
HCO3 SERPL-SCNC: 24 MMOL/L (ref 22–29)
HCT VFR BLD AUTO: 39.9 % (ref 35–47)
HGB BLD-MCNC: 13.5 G/DL (ref 11.7–15.7)
HOLD SPECIMEN: NORMAL
HOLD SPECIMEN: NORMAL
IMM GRANULOCYTES # BLD: 0 10E3/UL
IMM GRANULOCYTES NFR BLD: 0 %
LYMPHOCYTES # BLD AUTO: 1.5 10E3/UL (ref 0.8–5.3)
LYMPHOCYTES NFR BLD AUTO: 16 %
MCH RBC QN AUTO: 30.5 PG (ref 26.5–33)
MCHC RBC AUTO-ENTMCNC: 33.8 G/DL (ref 31.5–36.5)
MCV RBC AUTO: 90 FL (ref 78–100)
MONOCYTES # BLD AUTO: 0.4 10E3/UL (ref 0–1.3)
MONOCYTES NFR BLD AUTO: 5 %
NEUTROPHILS # BLD AUTO: 7.2 10E3/UL (ref 1.6–8.3)
NEUTROPHILS NFR BLD AUTO: 79 %
NRBC # BLD AUTO: 0 10E3/UL
NRBC BLD AUTO-RTO: 0 /100
PLATELET # BLD AUTO: 322 10E3/UL (ref 150–450)
POTASSIUM SERPL-SCNC: 4.2 MMOL/L (ref 3.4–5.3)
RBC # BLD AUTO: 4.42 10E6/UL (ref 3.8–5.2)
SODIUM SERPL-SCNC: 136 MMOL/L (ref 135–145)
WBC # BLD AUTO: 9.1 10E3/UL (ref 4–11)

## 2025-05-30 PROCEDURE — 99285 EMERGENCY DEPT VISIT HI MDM: CPT | Mod: 25

## 2025-05-30 PROCEDURE — 74176 CT ABD & PELVIS W/O CONTRAST: CPT

## 2025-05-30 PROCEDURE — 36415 COLL VENOUS BLD VENIPUNCTURE: CPT

## 2025-05-30 PROCEDURE — 85025 COMPLETE CBC W/AUTO DIFF WBC: CPT

## 2025-05-30 PROCEDURE — 84520 ASSAY OF UREA NITROGEN: CPT

## 2025-05-30 PROCEDURE — 96374 THER/PROPH/DIAG INJ IV PUSH: CPT

## 2025-05-30 PROCEDURE — 250N000011 HC RX IP 250 OP 636: Mod: JZ

## 2025-05-30 RX ORDER — ONDANSETRON 2 MG/ML
4 INJECTION INTRAMUSCULAR; INTRAVENOUS EVERY 30 MIN PRN
Status: DISCONTINUED | OUTPATIENT
Start: 2025-05-30 | End: 2025-05-30 | Stop reason: HOSPADM

## 2025-05-30 RX ORDER — ONDANSETRON 4 MG/1
4 TABLET, ORALLY DISINTEGRATING ORAL EVERY 6 HOURS PRN
Qty: 10 TABLET | Refills: 0 | Status: SHIPPED | OUTPATIENT
Start: 2025-05-30

## 2025-05-30 RX ORDER — OXYCODONE HYDROCHLORIDE 5 MG/1
5 TABLET ORAL EVERY 8 HOURS PRN
Qty: 9 TABLET | Refills: 0 | Status: SHIPPED | OUTPATIENT
Start: 2025-05-30

## 2025-05-30 RX ORDER — HYDROMORPHONE HYDROCHLORIDE 1 MG/ML
0.5 INJECTION, SOLUTION INTRAMUSCULAR; INTRAVENOUS; SUBCUTANEOUS EVERY 30 MIN PRN
Refills: 0 | Status: DISCONTINUED | OUTPATIENT
Start: 2025-05-30 | End: 2025-05-30 | Stop reason: HOSPADM

## 2025-05-30 RX ADMIN — HYDROMORPHONE HYDROCHLORIDE 0.5 MG: 1 INJECTION, SOLUTION INTRAMUSCULAR; INTRAVENOUS; SUBCUTANEOUS at 12:46

## 2025-05-30 ASSESSMENT — ACTIVITIES OF DAILY LIVING (ADL)
ADLS_ACUITY_SCORE: 52
ADLS_ACUITY_SCORE: 52

## 2025-05-30 NOTE — ED PROVIDER NOTES
EMERGENCY DEPARTMENT ENCOUNTER      NAME: Nathalie Bashir  AGE: 59 year old female  YOB: 1965  MRN: [id number]  EVALUATION DATE & TIME: 5/30/2025 12:17 PM    PCP: Marianne Gonzalez    ED PROVIDER: Yandel Marie MD    FINAL IMPRESSION:  1. Left flank pain    2. Pyelonephritis        ED COURSE & MEDICAL DECISION MAKING:    Pertinent Labs & Imaging studies reviewed. (See chart for details)  59 year old female presents to the Emergency Department for evaluation of left flank pain.  Differential diagnosis considered UTI, pyelonephritis, kidney stone, septic stone.     Triage Note: Pt reports dx with UTI and started on antibiotics last night. She notes L flank pain x 1 week. Last dosed with tylenol 0830      ED Course as of 05/31/25 0718   Fri May 30, 2025   1235 Patient with history of UTIs presenting with urinary symptoms and left flank pain.  Does have history of chronic pain.  Had outside urine culture that was positive for Klebsiella was sensitive to clindamycin.  Took 2 doses of clindamycin.  She has been having subjective chills and fevers and left-sided flank pain.  No history of kidney stones.  On exam does have mild CVA tenderness and left lower quadrant tenderness.  Will obtain a CT renal stone protocol to evaluate for kidney stone.  Is mildly tachycardic likely secondary to pain.  Does have chronic pain has been taking oxycodone.  Will symptomatically treat.  Will obtain CBC BMP.  Do not believe she has failure of outpatient antibiotics at this point.  Likely has pyelonephritis however given focal left-sided pain will obtain scan to rule out alternate pathology.  Patient agreeable with plan.   1319 Basic metabolic panel(!)  normal   1319 CBC with platelets differential  No leukocytosis    1401 Abd/pelvis CT no contrast - Stone Protocol  IMPRESSION:   1.  No acute abnormality in the abdomen or pelvis on noncontrast CT.  2.  Additional chronic noncritical details in the findings.     1405 Reviewed  PMD, Patient had oxycodone 5 mg added 5/17 negative at 90 tablets.  Will give short course of medicines that she has been using more than prescribed given her acute pain.      Retention consistent with pyelonephritis.  On Cipro which is sensitive to her UTI/pyelonephritis.  Tolerating p.o. intake.  Vital signs improved in the emergency department.  Will discharge home.  Patient agreeable with plan.       Not Applicable    I discussed the plan for discharge with the patient and patient is agreeable. We discussed supportive cares at home and reasons to return to the ER including new or worsening symptoms. All questions and concerns addressed to the best of my ability. Strict return precautions discussed. Patient to be discharge by RN.    At the conclusion of the encounter I discussed the results of the tests and the disposition. The questions were answered. The patient or family acknowledged understanding and was agreeable with the care plan.     MEDICATIONS GIVEN IN THE EMERGENCY:  Medications - No data to display      NEW PRESCRIPTIONS STARTED AT TODAY'S ER VISIT  Discharge Medication List as of 5/30/2025  2:11 PM        START taking these medications    Details   ondansetron (ZOFRAN ODT) 4 MG ODT tab Take 1 tablet (4 mg) by mouth every 6 hours as needed for nausea or vomiting., Disp-10 tablet, R-0, Local Print      !! oxyCODONE (ROXICODONE) 5 MG tablet Take 1 tablet (5 mg) by mouth every 8 hours as needed for pain., Disp-9 tablet, R-0, Local Print       !! - Potential duplicate medications found. Please discuss with provider.        Discharge Medication List as of 5/30/2025  2:11 PM          =================================================================    HPI    Nathalie Bashir is a 59 year old female with a pertinent history of gastric cancer, lumbar spondylosis, chronic pain syndrome, opioid dependence, who presents to this ED for evaluation of flank pain. Patient reports UTI diagnosed on 5/27/2025. She  "started taking her antibiotics last night, and has completed 2 doses so far. She is experiencing nausea, chills, dysuria, urinary cloudiness, and shakes. No vomiting. She is also experiencing 1.5 weeks of left flank pain that worsened when UTI began. She states she has heavy breathing with her back pain. She has had a kidney infection in the past, her back pain feels similar. No history of kidney stones. She has attempted oxycodone, Flexeril, Tylenol, and Voltaren without improvement.     Per lab review, patient's UA on 5/27/2025 was positive for nitrites, leukocyte esterase, and grew Klebsiella pneumoniae. Started on 7 day course of ciprofloxacin BID.      PHYSICAL EXAM    /71   Pulse 93   Temp 98.4  F (36.9  C)   Resp 16   Ht 1.702 m (5' 7\")   Wt 72.1 kg (159 lb)   LMP 09/23/2014   SpO2 97%   BMI 24.90 kg/m    Constitutional: Comfortable appearing. Nontoxic.   Head: Normocephalic, atraumatic, mucous membranes moist, nose normal.   Neck: Supple, gross ROM intact.   Eyes: Pupils mid-range, sclera white.  Respiratory: Clear to auscultation bilaterally, no respiratory distress, no wheezing, speaks full sentences easily.  Cardiovascular: Normal heart rate, regular rhythm, no murmurs. No lower extremity edema.   GI: Soft. Mild LLQ tenderness. No right sided tenderness.   Musculoskeletal: Moving all 4 extremities intentionally and without pain. No obvious deformity. Mild left flank tenderness. No right CVA tenderness.  Skin: Warm, dry, no rash.  Neurologic: Alert & oriented x 3, speech clear, moving all extremities spontaneously   Psychiatric: Affect normal, cooperative.        LAB:  All pertinent labs reviewed and interpreted.  Results for orders placed or performed during the hospital encounter of 05/30/25   Abd/pelvis CT no contrast - Stone Protocol    Impression    IMPRESSION:   1.  No acute abnormality in the abdomen or pelvis on noncontrast CT.  2.  Additional chronic noncritical details in the " findings.     Basic metabolic panel   Result Value Ref Range    Sodium 136 135 - 145 mmol/L    Potassium 4.2 3.4 - 5.3 mmol/L    Chloride 101 98 - 107 mmol/L    Carbon Dioxide (CO2) 24 22 - 29 mmol/L    Anion Gap 11 7 - 15 mmol/L    Urea Nitrogen 7.6 6.0 - 20.0 mg/dL    Creatinine 0.89 0.51 - 0.95 mg/dL    GFR Estimate 75 >60 mL/min/1.73m2    Calcium 9.1 8.8 - 10.4 mg/dL    Glucose 134 (H) 70 - 99 mg/dL   CBC with platelets and differential   Result Value Ref Range    WBC Count 9.1 4.0 - 11.0 10e3/uL    RBC Count 4.42 3.80 - 5.20 10e6/uL    Hemoglobin 13.5 11.7 - 15.7 g/dL    Hematocrit 39.9 35.0 - 47.0 %    MCV 90 78 - 100 fL    MCH 30.5 26.5 - 33.0 pg    MCHC 33.8 31.5 - 36.5 g/dL    RDW 13.1 10.0 - 15.0 %    Platelet Count 322 150 - 450 10e3/uL    % Neutrophils 79 %    % Lymphocytes 16 %    % Monocytes 5 %    % Eosinophils 0 %    % Basophils 0 %    % Immature Granulocytes 0 %    NRBCs per 100 WBC 0 <1 /100    Absolute Neutrophils 7.2 1.6 - 8.3 10e3/uL    Absolute Lymphocytes 1.5 0.8 - 5.3 10e3/uL    Absolute Monocytes 0.4 0.0 - 1.3 10e3/uL    Absolute Eosinophils 0.0 0.0 - 0.7 10e3/uL    Absolute Basophils 0.0 0.0 - 0.2 10e3/uL    Absolute Immature Granulocytes 0.0 <=0.4 10e3/uL    Absolute NRBCs 0.0 10e3/uL   Extra Blue Top Tube   Result Value Ref Range    Hold Specimen JIC    Extra Red Top Tube   Result Value Ref Range    Hold Specimen JIC        RADIOLOGY:  Reviewed all pertinent imaging. Please see official radiology report.  Abd/pelvis CT no contrast - Stone Protocol   Final Result   IMPRESSION:    1.  No acute abnormality in the abdomen or pelvis on noncontrast CT.   2.  Additional chronic noncritical details in the findings.               Yandel Marie MD  [facility] EMERGENCY DEPARTMENT  [address] [address] [phone]   =================================================================    BILLING:  Data  Category 1  Non-ED record review, if applicable.  External record reviewed: Reviewed recent office visit to urology where she had a urinalysis that showed sensitivity to ciprofloxacin     Clinical information was obtained from an independent historian. History was obtained from: Patient     The following testing was considered but ultimately not selected after discussion with patient/family: Considered UA as above however has a known UTI with a urine culture as above     Category 2  My independent interpretation of EKG, rhythm strip, radiology study: CT abdomen and pelvis did not reveal large AAA     Category 3  Discussion of management with other physician/healthcare provider/other source: N/A       Risk  Prescription medication was considered, but ultimately not given after discussion with patient/family: N/A     Chronic conditions affecting care: Chronic paindepression     Consideration of Admission/Observation: Escalation of care including admission/observation was considered given the complexity and risk of the patient's presenting complaint, exam findings, and/or their underlying comorbidities. However, ultimately I feel the patient is safe for outpatient management with close follow up. Reasoning: Work-up reassuring, does not reveal any acute life/organ threatening processes, patient's symptoms well controlled upon reevaluation, reexamination is reassuring, vitals are stable, patient agreeable with discharge, reliable for follow-up.       Considered admission for IV antibiotics however tolerating p.o. intake.  And have his unstable vital signs.  He has not had a failure of outpatient antibiotics.  Believe she c take oral antibiotics and discharge home.  Patient to with plan.          I, Pamela Perry, am serving as a scribe to document services personally performed by Yandel Marie MD based on my observation and the provider's statements to me. I, Yandel Marie MD, attest that Pamela Perry is acting in a scribe capacity, has observed my performance of  the services and has documented them in accordance with my direction.     Yandel Marie MD  05/31/25 0772

## 2025-05-30 NOTE — ED TRIAGE NOTES
Pt reports dx with UTI and started on antibiotics last night. She notes L flank pain x 1 week. Last dosed with tylenol 0830     Triage Assessment (Adult)       Row Name 05/30/25 1213          Triage Assessment    Airway WDL WDL        Respiratory WDL    Respiratory WDL WDL        Skin Circulation/Temperature WDL    Skin Circulation/Temperature WDL WDL        Cardiac WDL    Cardiac WDL WDL        Peripheral/Neurovascular WDL    Peripheral Neurovascular WDL WDL        Cognitive/Neuro/Behavioral WDL    Cognitive/Neuro/Behavioral WDL WDL        [place] Coma Scale    Best Eye Response 4-->(E4) spontaneous     Best Motor Response 6-->(M6) obeys commands     Best Verbal Response 5-->(V5) oriented     [place] Coma Scale Score 15

## 2025-06-02 ENCOUNTER — NURSE TRIAGE (OUTPATIENT)
Dept: FAMILY MEDICINE | Facility: CLINIC | Age: 60
End: 2025-06-02
Payer: MEDICARE

## 2025-06-02 ENCOUNTER — OFFICE VISIT (OUTPATIENT)
Dept: URGENT CARE | Facility: URGENT CARE | Age: 60
End: 2025-06-02
Payer: MEDICARE

## 2025-06-02 VITALS
BODY MASS INDEX: 24.51 KG/M2 | SYSTOLIC BLOOD PRESSURE: 101 MMHG | HEART RATE: 84 BPM | TEMPERATURE: 98.5 F | DIASTOLIC BLOOD PRESSURE: 66 MMHG | OXYGEN SATURATION: 99 % | WEIGHT: 156.5 LBS | RESPIRATION RATE: 16 BRPM

## 2025-06-02 DIAGNOSIS — M54.50 ACUTE MIDLINE LOW BACK PAIN WITHOUT SCIATICA: Primary | ICD-10-CM

## 2025-06-02 PROCEDURE — 3074F SYST BP LT 130 MM HG: CPT

## 2025-06-02 PROCEDURE — 99214 OFFICE O/P EST MOD 30 MIN: CPT

## 2025-06-02 PROCEDURE — 3078F DIAST BP <80 MM HG: CPT

## 2025-06-02 NOTE — PROGRESS NOTES
URGENT CARE  Assessment & Plan   Assessment:   Nathalie Bashir is a 59 year old female who's clinical presentation today is consistent with:   1. Acute midline low back pain without sciatica    - Spine  Referral; Future  Plan:  I was originally suspicious for pyelonephritis given the patient's recent urinary tract infection however she is currently on Cipro and without any systemic side effects additionally she is not endorsing flank pain, but rather low midline slight left paraspinal muscular pain, also on my differential with nephrolithiasis however patient recently had a CT scan which did not show any stones  patient has already exhausted many conservative measures and already takes chronic narcotics and has a pain agreement on file, recommend she continue with conservative treatments, I do not think more medications are appropriate and follow-up with spine specialty for further evaluation and treatment, nothing alarming today that would suggest a more emergent workup is needed.  No alarm signs or symptoms present   Differential Diagnoses for this patient's chief complaint that I considered include:  vertebral fracture,  inflammatory process, spinal stenosis, dislocation, degenerative process, arthrodesis, disc disease, Spondyloarthropathy, ankylosing spondylitis, Radicular low back pain, lumbosacral radiculopathy, cauda equina,      30 minutes spent by me (on the date of the encounter) doing chart review, history, physical exam, documentation, diagnostic testing, education/counseling and further activities per the note, not including procedures     Consent was obtained from patient to use a audio-scribe documentation tool in the creation of this note  Precious Espinal, NINA [facility] C[facility] [place]      ______________________________________________________________________      Subjective     HPI: Nathalie Bashir  is a 59 year old  female who presents today for evaluation the following  concerns:   She reports experiencing a nagging pain in her left lower back for the past two weeks. She initially suspected a urinary tract infection (UTI) and sought medical attention last Friday, where it was suggested that her symptoms might be related to a UTI. Despite this, the pain has worsened, localized to one spot, and feels like a knot, although she has not engaged in any heavy lifting or experienced any injury. A CT scan was performed, revealing no stones, but some degeneration of the spine was noted, which she has been aware of for a long time. 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 She is currently taking oxycodone 5 mg and has Flexeril 10 mg at home. She has also tried massage therapy in the past.    Review of Systems:  Pertinent review of systems as reflected in HPI, otherwise negative.     Objective    Physical Exam:  Vitals:    06/02/25 1800   BP: 101/66   Pulse: 84   Resp: 16   Temp: 98.5  F (36.9  C)   TempSrc: Oral   SpO2: 99%   Weight: 71 kg (156 lb 8 oz)      General: Alert and oriented, no acute distress, afebrile, normotensive  Psy/mental status: Nonanxious, cooperative  SKIN: Intact, no open areas  ABDOMEN:  soft, non-tender, non-distended    No flank pain or CVA tenderness   MSK: Motion intact, strength adequate for age and equal   Walking: normal gait   Back:      Inspection: normal posture        Palpation:  tenderness over lumbar paraspinals, worse on left       ROM: forward flexion to full range,       extension to full range, sidebending left to full range, sidebending right to full range,       rotation right to full range, and rotation left to full range.      Sensation: normal  Neuro:  motor and coordination are all within normal limits,   no numbness noted  ______________________________________________________________________    I explained my diagnostic considerations and recommendations to the patient  All questions were answered.   Please see AVS for any patient  instructions & handouts given.

## 2025-06-02 NOTE — TELEPHONE ENCOUNTER
Nurse Triage SBAR    Is this a 2nd Level Triage? NO    Situation: Severe L lower back pain    Background: SI joint infusion 2008/2009 (pt unsure of when), recent UTI infection, negative kidney stones at ED visit, seen in ED 5/30.    Assessment: Pt called because of severe L lower back pain. Rates it a 9/10 and states it radiating everywhere and it's constant. States it started 5/19. Feels like a knot that won't go away. Denies injury prior. States taken tylenol but it doesn't help. T was 98.1 today. Denies chest pain, sob, fever, vomiting, abdominal pain, urinary pain.    Protocol Recommended Disposition:   See in Office Today or Tomorrow    Recommendation: Go to urgent care as disposition recommends. Go to ED if pain doesn't subside. Call us back if new symptoms or go to ED. Pt agreeable to come to [facility] urgent care now.    Does the patient meet one of the following criteria for ADS visit consideration? No    Reason for Disposition   Age > 50 and no history of prior similar back pain    Additional Information   Negative: Passed out (e.g., fainted, lost consciousness, blacked out and was not responding)   Negative: Shock suspected (e.g., cold/pale/clammy skin, too weak to stand, low BP, rapid pulse)   Negative: Sounds like a life-threatening emergency to the triager   Negative: Major injury to the back (e.g., MVA, fall > 10 feet or 3 meters, penetrating injury, etc.)   Negative: Pain in the upper back over the ribs (rib cage) that radiates (travels) into the chest   Negative: Pain in the upper back over the ribs (rib cage) and worsened by coughing (or clearly increases with breathing)   Negative: Back pain during pregnancy   Negative: SEVERE back pain of sudden onset and age > 60 years   Negative: SEVERE abdominal pain (e.g., excruciating)   Negative: Abdominal pain and age > 60 years   Negative: Unable to urinate (or only a few drops) and bladder feels very full   Negative: Loss of bladder or bowel control  "(urine or bowel incontinence; wetting self, leaking stool) of new-onset   Negative: Numbness (loss of sensation) in groin or rectal area   Negative: Pain radiates into groin, scrotum   Negative: Blood in urine (red, pink, or tea-colored)   Negative: Vomiting and pain over lower ribs of back (i.e., flank - kidney area)   Negative: Weakness of a leg or foot (e.g., unable to bear weight, dragging foot)   Negative: Patient sounds very sick or weak to the triager   Negative: Fever > 100.4 F (38.0 C) and flank pain   Negative: Pain or burning with passing urine (urination)   Negative: SEVERE back pain (e.g., excruciating, unable to do any normal activities) and not improved after pain medicine and CARE ADVICE   Negative: Numbness in an arm or hand (i.e., loss of sensation) and upper back pain   Negative: Numbness in a leg or foot (i.e., loss of sensation)   Negative: High-risk adult (e.g., history of cancer, history of HIV, or history of IV Drug Use)   Negative: Soft tissue infection (e.g., abscess, cellulitis) or other serious infection (e.g., bacteremia) in last 2 weeks   Negative: Painful rash with multiple small blisters grouped together (i.e., dermatomal distribution or 'band' or 'stripe')   Negative: Pain radiates into the thigh or further down the leg, and in both legs    Answer Assessment - Initial Assessment Questions  1. ONSET: \"When did the pain begin?\" (e.g., minutes, hours, days)      5/19  2. LOCATION: \"Where does it hurt?\" (upper, mid or lower back)      Lower left back  3. SEVERITY: \"How bad is the pain?\"  (e.g., Scale 1-10; mild, moderate, or severe)      Right now it's a 9  4. PATTERN: \"Is the pain constant?\" (e.g., yes, no; constant, intermittent)       constant  5. RADIATION: \"Does the pain shoot into your legs or somewhere else?\"      Shoots everywhere into side and front  6. CAUSE:  \"What do you think is causing the back pain?\"       Had SI joint fusion 2008/2009  7. BACK OVERUSE:  \"Any recent " "lifting of heavy objects, strenuous work or exercise?\"      no  8. MEDICINES: \"What have you taken so far for the pain?\" (e.g., nothing, acetaminophen, NSAIDS)      Tylenol  9. NEUROLOGIC SYMPTOMS: \"Do you have any weakness, numbness, or problems with bowel/bladder control?\"      No  10. OTHER SYMPTOMS: \"Do you have any other symptoms?\" (e.g., fever, abdomen pain, burning with urination, blood in urine)        Blood in urine with UTI -- Friday  11. PREGNANCY: \"Is there any chance you are pregnant?\" \"When was your last menstrual period?\"        No    Protocols used: Back Pain-A-[place]    "

## 2025-06-02 NOTE — PROGRESS NOTES
Urgent Care Clinic Visit    Chief Complaint   Patient presents with    Back Pain     Lower back, started before last UTI (treatment started this past Thursday), pain still present and still on antibiotic               6/2/2025     5:58 PM   Additional Questions   Roomed by Ania PEREZ   Accompanied by Self     Ania Galeano on 6/2/2025 at 6:00 PM

## 2025-06-03 ENCOUNTER — PRE VISIT (OUTPATIENT)
Dept: UROLOGY | Facility: CLINIC | Age: 60
End: 2025-06-03
Payer: MEDICARE

## 2025-06-03 NOTE — TELEPHONE ENCOUNTER
Reason for visit: follow-up    Relevant information: recurrent UTI, Dr. Velez requested an appointment with Jacquelyn before ordering additional UA/UC    Records/imaging/labs/order: many UA/UCS over the last 2 years available in Epic    At rooming: virgie Parrish  6/3/2025  9:37 AM

## 2025-06-05 NOTE — PROGRESS NOTES
[facility] Suit[facility] Post Procedure Note    Patient Information  Name: Nathalie Bashir  Age: 54 year old    Post Procedure  Procedure: right port placement  Time patient returned to Care Suites: 0904  Concerns/abnormal assessment: none  If abnormal assessment, provider notified: N/A  Plan/Other: observe until discharge  Right port and neck dsng dry and intact, denies any pain, taking PO box lunch, juice, water, alert and oriented.     Ludy Hurst RN      [facility]  [facility]  Progress Note    Patient Name: Armando Ingram Jr.  MRN: [id number]  Patient Class: IP- Inpatient   Admission Date: 5/16/2025  Length of Stay: 19 days  Attending Physician: Lainey Tao MD  Primary Care Provider: Brian Soares MD        Subjective     Principal Problem:Neutropenic fever        HPI:  Armando Ingram Jr. is a 67 y.o. 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 who presented to the ED for further evaluation of acute onset fever with T-max measuring 101.0 F earlier today.  Patient reported significant history of hairy cell leukemia in his followed by Dr. Dow and Dr. Flores from Hematology/Oncology and was recently prescribed a Z-Cordell for 4 days for treatment of a cough.  Patient was instructed to go to the ED if he endorsed fever greater than 103.0 F but presented to the ED due to ulcer experiencing associated chills, throbbing headache, and persistent fever.  He reported no known alleviating or aggravating factors noted with all other review of systems negative except as noted above.  He is not currently on active treatment for his leukemia and reported being in his usual state of health prior to onset of symptoms.  Initial workup in the ED revealed patient to be afebrile with T-max measuring 100.4 F, pancytopenic, lactic acid/procalcitonin within normal limits, flu/COVID negative, chest x-ray negative for acute findings, UA positive for 2+ LE, 11 RBCs, free found WBCs.  Patient initiated on cefepime with cultures obtained and pending and admitted to Hospital Medicine under observation for continued medical management and treatment of neutropenic fever.    PCP: Brian Soares      Overview/Hospital Course:  Continued on IV Cefepime. Zyvoz added 05/18 due to persistent fever. Hematology consulted. Cultures remain NGTD    Resp viral panel negative. ID consulted to eval due to fevers  Cefepime changed to IV Merrem per ID recs on 05/21, unclear source of fevers at this time. Zyvox discontinued per ID.   CT Abd/Pelvis showed bladder thickening but otherwise negative.   ID recommend TTE, BLE duplex and possible Indium scan for source of infection and de-escalate abx to Cefepime on 05/23.   TTE with no vegetations. BLE duplex showed chronic RLE DVT. Indium scan pending tentatively planned for 05/26-05/27 as radiology does not have required materials at this time and scan will take 2 days to complete per RT.   Persistent fevers, abx changed back to Merrem 05/25 per ID recs. Karius pending   5/26 fever curve trending down. Respiratory infection panel negative. Asymptomatic. Continue intravenous antibiotic(s)   5/27 neutropenic fever persists despite scheduled tylenol. Awaiting tagged wbc scan per infectious disease recommendations.   5/28 febrile overnight. Infectious disease recommending addition of doxy. Hem/onc following. Awaiting karius and imaging. Refused lumbar puncture. If afebrile x 48h, discharge   5/29 febrile. Positive cryptococcal infection. Infectious disease adjusting antimicrobials. Plans for lumbar puncture, mri brain, and indium scan.  5/30/2025: s/p LP today.  Opening pressure within normal range at 18. clear CSF fluid noted. Cell count, glucose, Christiana Ink, Cryptococcal antigen, MS panel, Fungal Culture, and Bacterial culture obtained. Patient received first dose of induction therapy for cryptococcal infection and ding well. Indium Scan in progress. Given toxic nature of amphotericin and flucytosine will closely monitor chemistries. Oncology consulted with partners and decision made to defer treatment for hair cell leukemia until infection has been treated.   5/31/25: Patient has remained afebrile today. ALP and AST elevated after 48 hours of treatment. Serum cryptococcus antigen negative. Christiana ink negative. Indium  scan also negative today. Discussed with ID who recommends stopping flucytosine. Continue Amphotericin until CSF Crypto Ag is known. If negative and patient remains afebrile, patient will likely discharge on fluconazole treatment for non meningitis/non-pulmonary cryptococcus which can be treated as antigenemia with extended course of fluconazole.   6/1/25: Xarelto resumed yesterday and H/H unchanged from previous. Febrile overnight (Tmax 101.9) in absence of flucytosine. Although both serum and CSF were negative for Cryptotoccus Antigen it was decided proceed with complete induction therapy with Amphotericin and Flucytosine. Will monitor closely liver enzymes and fever curve. ALP today 194, AST 55.     Discussed with Dr. Villanueva.  He continued amphotericin and flucytosine 6/3 after dose and monitor fever curve.    Patient had temp of 99.8°.  P.o. Diflucan begun at 400 mg a day.  We will observe for 24 hours and if he remains afebrile we will plan on discharging    No new subjective & objective note has been filed under this hospital service since the last note was generated.        Assessment & Plan  Neutropenic fever  Cryptococcosis  5/31/25   Source of infection is unclear. Serum cryptococcus antigen negative. Christiana ink negative. Indium scan also negative today. Discussed with ID who recommends stopping flucytosine. Continue Amphotericin until CSF Crypto Ag is known. If negative and patient remains afebrile, patient will likely discharge on fluconazole treatment for non meningitis/non-pulmonary cryptococcus which can be treated as antigenemia with extended course of fluconazole.     6/1/25  Febrile overnight (Tmax 101.9) in absence of flucytosine. Although both serum and CSF were negative for Cryptotoccus Antigen. It was decided proceed with complete induction therapy with Amphotericin and Flucytosine. Will monitor closely liver enzymes and fever curve. ALP today 194, AST 55.   CXR with NAF, Flu/Covid neg   Resp  viral panel negative   Urine and blood cultures NGTD   Monitor VS   CT abd pelvis with bladder thickening and IVC filter but no other source of infection. TTE with no valvular abnormalities. BLE duplex showed chronic RLE DVT   Continue current treatment further recs per ID are pending  5/31/25   Per ID patient changed to p.o. Diflucan 400 mg daily.  Temp last p.m. 99.8.  We will observe for 24 hours and if he remains afebrile discharged home and follow up with outpatient ID  Hairy cell leukemia  Patient with known history of hairy cell leukemia and continues to follow Dr. Dow and Dr. Flores from Hematology/Oncology. Patient not currently on active treatment and currently undergoing treatment for neutropenic fever with broad-spectrum antibiotics as noted above.  Hematology consulted and following     5/30/25  Hematology discussed with other partners and it was decided not to proceed with treatment for leukemia. This was discussed with family.   Pancytopenia  This patient is found to have pancytopenia, the likely etiology is , will monitor CBC . Will transfuse red blood cells if the hemoglobin is <7g/dL (or <8 in the setting of ACS). Hold DVT prophylaxis if platelets are <50k. The patient's hemoglobin, white blood cell count, and platelet count results have been reviewed.      Remains stable  History of DVT (deep vein thrombosis)  - BLE duplex with chronic RLE DVT    -prior hx of IVC filter in 2002   Hold xarelto for lumbar puncture     6/1/25  Xarelto resumed   Transaminitis  Lab Results   Component Value Date    AST 40 06/05/2025    AST 58 (H) 06/04/2025    ALT 38 06/05/2025    ALT 39 06/04/2025    ALKPHOS 239 (H) 06/05/2025    ALKPHOS 225 (H) 06/04/2025        VTE Risk Mitigation (From admission, onward)           Ordered     rivaroxaban tablet 20 mg  With dinner         05/31/25 1415     Reason for No Pharmacological VTE Prophylaxis  Once        Question:  Reasons:  Answer:  Already adequately anticoagulated  on oral Anticoagulants    05/17/25 0213     IP VTE HIGH RISK PATIENT  Once         05/17/25 0213     Place sequential compression device  Until discontinued         05/17/25 0213                    Discharge Planning   LAURI: 6/6/2025     Code Status: Full Code   Medical Readiness for Discharge Date:   Discharge Plan A: Home with family                        Lainey Basurto MD  Department of Hospital Medicine   [facility]

## 2025-06-10 ENCOUNTER — MYC REFILL (OUTPATIENT)
Dept: PALLIATIVE MEDICINE | Facility: OTHER | Age: 60
End: 2025-06-10
Payer: MEDICARE

## 2025-06-10 DIAGNOSIS — G89.29 CHRONIC RIGHT SHOULDER PAIN: ICD-10-CM

## 2025-06-10 DIAGNOSIS — G89.29 CHRONIC INTRACTABLE PAIN: ICD-10-CM

## 2025-06-10 DIAGNOSIS — M25.511 CHRONIC RIGHT SHOULDER PAIN: ICD-10-CM

## 2025-06-10 DIAGNOSIS — M54.12 CERVICAL RADICULOPATHY: ICD-10-CM

## 2025-06-10 RX ORDER — OXYCODONE HYDROCHLORIDE 5 MG/1
5 TABLET ORAL 3 TIMES DAILY PRN
Qty: 90 TABLET | Refills: 0 | Status: SHIPPED | OUTPATIENT
Start: 2025-06-10

## 2025-06-10 NOTE — TELEPHONE ENCOUNTER
Refills have been requested for the following medications:         oxyCODONE (ROXICODONE) 5 MG tablet [Desi Albright]     Preferred pharmacy: [facility] [address]

## 2025-06-10 NOTE — TELEPHONE ENCOUNTER
Received request for a refill(s) of     oxyCODONE (ROXICODONE) 5 MG tablet        Last dispensed from pharmacy on 5/17/2025    Patient's last office/virtual visit by prescribing provider on 3/25/2025  Next office/virtual appointment scheduled for 6/24/2025    Last urine drug screen date 10/29/2024  Current opioid agreement on file (completed within the last year) YesDate of opioid agreement: 10/29/2024    E-prescribe to [facility] [address] [facility]  pharmacy    Will route to nursing Arpin for review and preparation of prescription(s).

## 2025-06-12 ENCOUNTER — OFFICE VISIT (OUTPATIENT)
Dept: PHYSICAL MEDICINE AND REHAB | Facility: CLINIC | Age: 60
End: 2025-06-12
Payer: MEDICARE

## 2025-06-12 VITALS
SYSTOLIC BLOOD PRESSURE: 112 MMHG | WEIGHT: 160 LBS | HEART RATE: 95 BPM | HEIGHT: 68 IN | DIASTOLIC BLOOD PRESSURE: 60 MMHG | BODY MASS INDEX: 24.25 KG/M2

## 2025-06-12 DIAGNOSIS — M54.16 LUMBAR RADICULOPATHY: ICD-10-CM

## 2025-06-12 DIAGNOSIS — T45.1X5A PERIPHERAL NEUROPATHY DUE TO CHEMOTHERAPY: ICD-10-CM

## 2025-06-12 DIAGNOSIS — G62.0 PERIPHERAL NEUROPATHY DUE TO CHEMOTHERAPY: ICD-10-CM

## 2025-06-12 DIAGNOSIS — C16.3 MALIGNANT NEOPLASM OF PYLORIC ANTRUM (H): Primary | ICD-10-CM

## 2025-06-12 ASSESSMENT — PAIN SCALES - GENERAL: PAINLEVEL_OUTOF10: SEVERE PAIN (8)

## 2025-06-12 NOTE — LETTER
6/12/2025      Nathalie Bashir  [address] [facility] [address]      Dear Colleague,    Thank you for referring your patient, Nathalie Bashir, to the [facility]. Please see a copy of my visit note below.    ASSESSMENT: Nathalie Bashir is a 59 year old female who presents for consultation at the request of PCP Marianne Gonzalez, who presents today for new patient evaluation of:    -Acute midline low back pain without sciatica/ Teddy @ [facility]/ no imaging, L4-L5 decompression 2011? & SI Joint Fusion 2009? Dr Rinku Phillip    Patient has generalized sensory loss from distal thighs all the way down both legs and feet which she states is chronic. 1/4 R patellar reflex, 0/4 L patellar reflex, 0/4 jose achilles. Increased back pain with extension, does have some lower lumbar point tenderness and left lower paraspinal musculature tenderness. Given hx cancer and surgery recommend updated lumbar MRI imaging for which to use to consider additional injections. Will review results and call patient. Consider follow up in person to review options.        6/7/2025     8:42 AM   OSWESTRY DISABILITY INDEX   Count 10    Sum 20    Oswestry Score (%) 40 %        Patient-reported            Diagnoses and all orders for this visit:  Malignant neoplasm of pyloric antrum (H)  -     MR Lumbar Spine w/o & w Contrast; Future  Peripheral neuropathy due to chemotherapy  Lumbar radiculopathy  -     MR Lumbar Spine w/o & w Contrast; Future      PLAN:  Reviewed spine anatomy and disease process. Discussed diagnosis and treatment options with the patient today. A shared decision making model was used.  The patient's values and choices were respected. The following represents what was discussed and decided upon by the provider and the patient.      -DIAGNOSTIC TESTS:  Images were personally reviewed and interpreted and explained to patient today using a spine model.   -Lumbar MRI with and without contrast orders  placed      -PHYSICAL THERAPY:    --has completed PT in the past  -ongoing PT exercises causing worsening pain   Discussed the importance of core strengthening, ROM, stretching exercises with the patient and how each of these entities is important in decreasing pain.  Explained to the patient that the purpose of physical therapy is to teach the patient a home exercise program.  These exercises need to be performed every day in order to decrease pain and prevent future occurrences of pain.        -MEDICATIONS:    --continue current medications at this time      -INTERVENTIONS:    -Did not discuss the role of injections with patient today. Patient would be a good candidate in the future for either epidural steroid injections or additional medial branch blocks/RFA at other levels if indicated based on symptoms and supported by imaging results.    -PATIENT EDUCATION:  Total time of 40 minutes, on the day of service, spent with the patient, reviewing the chart, placing orders, and documenting.   -Today we also discussed the pros and cons of the current treatment plan.    -FOLLOW-UP:   we will call with imaging results    Advised patient to call the [facility] if symptoms worsen, new numbness or weakness develops in the legs, or if they develop new or worsening problems controlling bladder or bowel function.   ______________________________________________________________________    SUBJECTIVE:    HPI:  Nathalie Bashir  Is a 59 year old female hx gastric adenocarcinoma, whipple procedure, peripheral neuropathy dt chemo, osteoarthritis, chronic pain, long term use of opiate analgesic, frequent UTI, left total hip replacement 2015, left SI joint fusion 2009, L4-5 decompression in 2012 who presents today for new patient evaluation of Acute midline low back pain without sciatica    Referral from urgent care visit 6/2 for evaluation of acute lower back pain. Recent UTI bringing her to ED 5/30 9/10 pain and given  antibiotics.  She reports constant bilateral lower back pain, severe nagging pain. It is around the clock. Today it is a 6/10. Sometimes the pain goes down the left leg past the knee. Sometimes the right side acts up as well.   She has been more tired, taking more naps. She tries to get everything done before noon because by noon the pain is so bad she is not able to do anything. She has to lie down often because it takes some pressure off of the tailbone area and lower back which helps.    Mornings are better. Sometimes her back hurts so bad she is not able to take a shower or wash her hair. It improves somewhat with sitting. She has pain in the car, has to adjust her position quite often. She reports multiyear chronic numbness of the rectal area, not able to feel when she stools. This was present prior to her SI joint fusion in 2009.  She does not note any change in her bladder control or ability to feet when she urinates. She reports chronic numbness of the mid thigh down of both legs due to chemo. She has chronic weakness in the left leg because of her left total hip replacement. She was told she also needs a hip replacement on the right side but has not had this done yet - did the left one first.    Prev seen in 2023 by Dr Urbina    Last PT was spring 2025 for her shoulder only.  [facility] her lower back  around 2011 after her lumbar surgery  She is hypermobile and PT exercises have historically made her back symptoms worse and she still does these    Has been following with the pain team - Desi Albright.   She takes flexeril 10mg TID PRN which helps some. hydroxyzine 25mg 1-2 tabs QID anxiety and pain, which does help some. amitriptyline, celexa for depression and anxiety, oxycodone 5mg TID. She has tried norco, gabapentin, lyrica, cymbalta, celebrex, tylenol in the past with side effects. She gets diarrhea with tylenol. Voltaren gel is helpful. She is not able to take any  NSAIDs or tylenol due to her history of GI surgeries.    She recently had jose L3-4-5 RFA 4/23/25 pain has been worse since then, no difference  Had a C7-T1 IL MORGAN 1/15/25 (70% pain relief) and on 7/10/24  R shoulder injection 12/11/24 (not much relief)  She had some lumbar epidural steroid injections in [id number]    Last lumbar MRI without contrast done in 2023      -Treatment to Date:     -Medications:    Current Outpatient Medications   Medication Sig Dispense Refill     amitriptyline (ELAVIL) 75 MG tablet TAKE 1 TABLET (75 MG) BY MOUTH AT BEDTIME 30 tablet 3     citalopram (CELEXA) 20 MG tablet Take 1.5 tablets (30 mg) by mouth daily 135 tablet 1     cyclobenzaprine (FLEXERIL) 10 MG tablet TAKE 1 TABLET BY MOUTH THREE TIMES A DAY AS NEEDED FOR MUSCLE SPASM 90 tablet 3     famotidine (PEPCID) 20 MG tablet TAKE 1 TABLET (20 MG) BY MOUTH 2 TIMES DAILY AS NEEDED 180 tablet 1     fluticasone (FLONASE) 50 MCG/ACT nasal spray Spray 1 spray into both nostrils daily at 2 pm       nitroFURantoin macrocrystal-monohydrate (MACROBID) 100 MG capsule Take 1 capsule (100 mg) by mouth 2 times daily. 14 capsule 0     ondansetron (ZOFRAN ODT) 4 MG ODT tab Take 1 tablet (4 mg) by mouth every 6 hours as needed for nausea or vomiting. 10 tablet 0     ondansetron (ZOFRAN) 8 MG tablet Take 1 tablet (8 mg) by mouth every 8 hours as needed (Nausea/Vomiting) 90 tablet 2     oxyCODONE (ROXICODONE) 5 MG tablet Take 1 tablet (5 mg) by mouth 3 times daily as needed for severe pain. Fill 06/16/25 for start 06/18/25 90 tablet 0     oxyCODONE (ROXICODONE) 5 MG tablet Take 1 tablet (5 mg) by mouth every 8 hours as needed for pain. 9 tablet 0     pantoprazole (PROTONIX) 40 MG EC tablet TAKE 1 TABLET BY MOUTH TWICE A  tablet 1     phenazopyridine (PYRIDIUM) 100 MG tablet Take 1 tablet (100 mg) by mouth 3 times daily as needed for urinary tract discomfort. 9 tablet 0     sennosides (SENOKOT) 8.6 MG tablet 1-2 tablets 1-2 times daily as needed.  120 tablet 1     simethicone 80 MG TABS Take 1 tablet by mouth every 6 hours as needed (bloating, gas, belching) 90 tablet 3     SUMAtriptan (IMITREX) 100 MG tablet Take 100 mg by mouth at onset of headache for migraine       No current facility-administered medications for this visit.       Allergies   Allergen Reactions     Gluten Meal Palpitations     Amoxicillin-Pot Clavulanate Rash     Patient tolerated Zosyn in 7/2020     Oxycontin [Oxycodone] Other (See Comments)     Confusion, loopy, oxycodone is tolerated.    Pt states she tolerates regular oxycodone, cannot take 12 hour oxycontin.       Pregabalin      interfered with Cymbalta     Topiramate      Tongue numbness, taste alteration, irritable      Acetaminophen Nausea     Urine retention       Dust Mite Extract      Gabapentin Dizziness and GI Disturbance     Ketorolac Headache     Latex Rash     Methadone Fatigue     Mold      Naprosyn [Naproxen] Dizziness and GI Disturbance     Seasonal Allergies        Past Medical History:   Diagnosis Date     Allergic rhinitis      Cancer (H)     stomach cancer     Chronic pain      Closed fracture of distal end of left radius      Depression      Gastric adenocarcinoma (H)      Lumbago      Migraine      Opioid dependence (H)      Other chronic pain     low back     Sacroiliitis     low back pain     Trochanteric bursitis     leg length discrrepancy, hip pain        Patient Active Problem List   Diagnosis     Lower limb length difference     IPMN (intraductal papillary mucinous neoplasm)     Allergic rhinitis     Arthralgia of hip     Bursitis     Chronic neck pain     Chronic pain disorder     Continuous opioid dependence (H)     Controlled substance agreement signed     Debility     Degeneration of intervertebral disc of lumbosacral region     Depression     Disorder of sacrum     Greater trochanteric bursitis of left hip     Infected prosthesis of left hip     Chronic bilateral low back pain without sciatica     MDD  (major depressive disorder), recurrent, in partial remission     Migraine with aura     Sacroiliitis     Screening for malignant neoplasm of cervix     Spondylosis of lumbosacral spine without myelopathy     Status post left hip replacement     Cervical radiculopathy, chronic     Unilateral inguinal hernia without obstruction or gangrene     Long term (current) use of opiate analgesic     Low urine output     Hypophosphatemia     Leukocytosis     Intra-abdominal fluid collection     Bacteremia due to Gram-negative bacteria     Malnutrition     Gastric adenocarcinoma (H)     Pneumonia     Mucositis     Chemotherapy-induced neutropenia     Gastric cancer (H)     Cholangitis (H)     Thrombocytopenia     Urinary tract infection     Crohn's disease of small intestine (H)     Jejunitis     Colitis     Abdominal pain     Malignant neoplasm of overlapping sites of stomach (H)     Iron deficiency anemia     Anemia     Paralytic ileus (H)     Hypokalemia     Nausea & vomiting     Dysphagia     History of gastric surgery     Chemotherapy-induced neuropathy     Bilateral hand pain     Pyelonephritis     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)     Other closed intra-articular fracture of distal end of left radius, initial encounter     Lumbar facet arthropathy     Lumbar spondylosis     H/O Whipple procedure       Past Surgical History:   Procedure Laterality Date     ARTHROPLASTY HIP Left 2016     BACK SURGERY      L4-5 decompression     CATARACT IOL, RT/LT       CHOLECYSTECTOMY N/A 1/28/2020    Procedure: Cholecystectomy;  Surgeon: Yandel Mallory MD;  Location: [facility] OR     ENDOSCOPIC RETROGRADE CHOLANGIOPANCREATOGRAM N/A 7/27/2020    Procedure: ENDOSCOPIC RETROGRADE CHOLANGIOPANCREATOGRAPHY  **Latex Allergy** with jejunal biopsies;  Surgeon: Jeet Aviles MD;  Location: [facility] OR     ESOPHAGOSCOPY, GASTROSCOPY, DUODENOSCOPY (EGD), COMBINED N/A 3/3/2020    Procedure: ESOPHAGOGASTRODUODENOSCOPY, WITH  ENDOSCOPIC US (enoxaparin);  Surgeon: Bakari Plata MD;  Location: [facility] GI     ESOPHAGOSCOPY, GASTROSCOPY, DUODENOSCOPY (EGD), COMBINED N/A 12/7/2020    Procedure: Upper Endoscopy with stent placement;  Surgeon: Jeet Aviles MD;  Location:  OR     ESOPHAGOSCOPY, GASTROSCOPY, DUODENOSCOPY (EGD), COMBINED N/A 2/8/2021    Procedure: ESOPHAGOGASTRODUODENOSCOPY (EGD)AND STENT REMOVAL;  Surgeon: Jeet Aviles MD;  Location:  OR     EYE SURGERY       GASTRECTOMY N/A 10/21/2020    Procedure: Open subtotal gastectomy with David en Y Reconstruction; D1 Lymph Node Disection  **Latex Allergy**;  Surgeon: Yandel Mallory MD;  Location: [facility] OR     INJECT BLOCK MEDIAL BRANCH CERVICAL/THORACIC/LUMBAR Bilateral 9/27/2023    Procedure: BILATERAL L4-5, L5-S1 medial branch block #1;  Surgeon: Maria Victoria Urbina MD;  Location: [facility] OR     IR CHEST PORT PLACEMENT > 5 YRS OF AGE  3/26/2020     IR LUMBAR EPIDURAL STEROID INJECTION  8/25/2009     IR LUMBAR EPIDURAL STEROID INJECTION  9/2/2009     IR LUMBAR EPIDURAL STEROID INJECTION  9/25/2009     IR LUMBAR EPIDURAL STEROID INJECTION  11/23/2009     IR LUMBAR EPIDURAL STEROID INJECTION  4/1/2010     IR LUMBAR EPIDURAL STEROID INJECTION  9/1/2010     IR LUMBAR EPIDURAL STEROID INJECTION  3/10/2011     IR LUMBAR EPIDURAL STEROID INJECTION  8/30/2011     IR PICC PLACEMENT > 5 YRS OF AGE  10/12/2016     IR PORT REMOVAL RIGHT  12/8/2023     LAPAROSCOPY DIAGNOSTIC (GENERAL) N/A 10/13/2020    Procedure: Diagnostic laparoscopy with peritoneal washings  **Latex Allergy**;  Surgeon: Yandel Mallory MD;  Location: [facility] OR     OPEN REDUCTION INTERNAL FIXATION RODDING INTRAMEDULLARY FEMUR Left 12/23/2014    Procedure: OPEN REDUCTION INTERNAL FIXATION RODDING INTRAMEDULLARY FEMUR;  Surgeon: Arnol Santiago MD;  Location: [facility] OR     OPEN REDUCTION INTERNAL FIXATION WRIST Left 9/22/2022    Procedure: LEFT WRIST OPEN REDUCTION INTERNAL FIXATION, FRACTURE;  Surgeon: Mark Villalobos MD;   Location: [facility] OR     ORTHOPEDIC SURGERY       PICC DOUBLE LUMEN PLACEMENT Right 02/07/2020    5 fr double lumen 41 cm     MT LAMINEC/FACETECT/FORAMIN,LUMBAR 1 SEG      Description: Laminectomy Decompress, Facetectomy, Foraminotomy Lumbar Seg;  Recorded: 04/12/2012;     REMOVE HARDWARE LOWER EXTREMITY Left 4/7/2015    Procedure: REMOVE HARDWARE LOWER EXTREMITY;  Surgeon: Arnol Santiago MD;  [facility]: [facility] OR     REMOVE HARDWARE RODDING INTRAMEDULLARY FEMUR Left 12/17/2015    Procedure: REMOVE HARDWARE RODDING INTRAMEDULLARY FEMUR;  Surgeon: Arnol Santiago MD;  [facility] OR     RESECT BONE LOWER EXTREMITY Left 12/23/2014    Procedure: RESECT BONE LOWER EXTREMITY;  Surgeon: Arnol Santiago MD;  [facility] OR     SHORTENING OSTEOPLASTY FEMUR W/ IM NAILING       WHIPPLE PROCEDURE N/A 1/28/2020    Procedure: Open Whipple procedure and Cholecystectomy;  Surgeon: Yandel Mallory MD;  Location: [facility] OR     WHIPPLE PROCEDURE       ZZC FUSION OF SACROILIAC JOINT      Description: Arthrodesis Sacroiliac Joint Left;  Recorded: 10/07/2011;     ZZC REPAIR DETACH RETINA,SCLERAL BUCKLE         Family History   Problem Relation Age of Onset     Heart Failure Mother 77     Dementia Father         frontal lobe     No Known Problems Sister      No Known Problems Brother      Anesthesia Reaction No family hx of      Deep Vein Thrombosis No family hx of        Reviewed past medical, surgical, and family history with patient found on new patient intake packet located in EMR Media tab.     SOCIAL HX: nonsmoker, no alcohol use    ROS: positive for headache, color changes in hands and feet, abdominal pain, diarrhea, constipation, blood in the urine, joint pain, itching, imbalance, easy bruising, anxiety, depression. Specifically negative for bowel/bladder dysfunction, balance changes, headache, dizziness, foot drop, fevers, chills, appetite changes, nausea/vomiting, unexplained weight loss. Otherwise 13 systems reviewed are  "negative. Please see the patient's intake questionnaire from today for details.    OBJECTIVE:  /60   Pulse 95   Ht 5' 7.5\" (1.715 m)   Wt 160 lb (72.6 kg)   LMP 09/23/2014   BMI 24.69 kg/m      PHYSICAL EXAMINATION:    --CONSTITUTIONAL:  Vital signs as above.  No acute distress.  The patient is well nourished and well groomed.  --PSYCHIATRIC:  Appropriate mood and affect. The patient is awake, alert, oriented to person, place, time and answering questions appropriately with clear speech.    --SKIN:  Skin over the face, bilateral lower extremities, and posterior torso is clean, dry, intact without rashes.    --RESPIRATORY: Normal rhythm and effort. No abnormal accessory muscle breathing patterns noted.   --ABDOMINAL:  Non-distended.    --GROSS MOTOR: Gait is with left limp. Easily arises from a seated position. Toe walking and heel walking are normal without significant difficulty.      --LOWER EXTREMITY MOTOR TESTING:  Hip flexion: right 5/5, left 5/5  Quads: right 5/5, left 5/5  Hamstrings: right 5/5, left 5/5  Dorsiflexion: right 5/5, left 5/5  Plantar flexion: right 5/5, left 5/5    Great toe MTP extension/EHL: right 5/5, left 5/5    --NEUROLOGICAL:  0/4 R 1/4 L patellar and 0/4 achilles reflexes bilaterally. Sensation to light touch is decreased to light touch mid thigh down both legs. Sensation is normal in prox anterior thighs only. Babinski is negative. No clonus.    --MUSCULOSKELETAL: Lumbar spine inspection reveals no evidence of deformity. Range of motion is  limited in lumbar flexion, extension. positive lower lumbar point tenderness and left lower paraspinal musculature tenderness    Straight leg raising is negative.    --SACROILIAC JOINT: One finger point test was negative. Negative SI joint tenderness to palpation bilaterally.    --VASCULAR:  Bilateral lower extremities are warm without any discoloration.  There is no pitting edema of the bilateral lower extremities.    RESULTS:   Prior " medical records from [facility] Everywhere were reviewed today.    Imaging: Spine imaging was personally reviewed and interpreted today. The images were shown to the patient and the findings were explained using a spine model.      Abd/pelvis CT no contrast - Stone Protocol  Result Date: 5/30/2025  EXAM: CT ABDOMEN PELVIS W/O CONTRAST LOCATION: [facility] DATE: 5/30/2025 INDICATION: Left flank and left lower quadrant pain COMPARISON: CT abdomen pelvis 10/20/2024 TECHNIQUE: CT scan of the abdomen and pelvis was performed without IV contrast. Multiplanar reformats were obtained. Dose reduction techniques were used. CONTRAST: None. FINDINGS: LOWER CHEST: Normal. HEPATOBILIARY: Normal unenhanced liver contours. Cholecystectomy. PANCREAS: Postoperative changes from Whipple. No stranding around the remaining pancreas. SPLEEN: Normal. ADRENAL GLANDS: Normal. KIDNEYS/BLADDER: Normal unenhanced renal contours. No urolithiasis or collecting system dilation. Minimally distended bladder. BOWEL: Postoperative changes from Whipple. Scattered colonic diverticulosis without evidence of diverticulitis. Normal appendix. No free fluid or free air. LYMPH NODES: No lymphadenopathy. VASCULATURE: The abdominal aorta is nonaneurysmal with moderate atheromatous disease. PELVIC ORGANS: Uterus is present. No adnexal mass. MUSCULOSKELETAL: Degenerative changes of the spine. Left hip replacement and left SI joint fusion.     IMPRESSION: 1.  No acute abnormality in the abdomen or pelvis on noncontrast CT. 2.  Additional chronic noncritical details in the findings.     Narrative & Impression   EXAM: MR LUMBAR SPINE W/O CONTRAST  LOCATION: [facility]  DATE: 9/3/2023     INDICATION: History of lumbar lami (+10yrs), primarily axial LBP>BLE radicular pain.  COMPARISON: Plain film imaging 08/29/2023 of the lumbar spine, prior body CT 08/30/2023 also reviewed.  TECHNIQUE: Routine Lumbar  Spine MRI without IV contrast.     FINDINGS:   Nomenclature is based on 5 lumbar type vertebral bodies. Rudimentary 12th ribs are identified. Partial sacralization left L5. Correlate with plain film imaging if there is future plan for intervention. Satisfactory lumbar vertebral body height. 2 mm   degenerative anterior subluxation L3-L4 as on prior body CT. No high-grade subluxations. No marrow or ligamentous edema. Nothing for sacral insufficiency or stress fracture. Nothing for a marrow-infiltrative process. Benign vertebral body hemangioma at   L1. Interspace narrowing lower thoracic level and at L3-L4. Congenital narrowing of the L5-S1 interspace. Left SI joint space fusion hardware with degenerative changes right SI joint. Sacral neural foramina are intact. Normal distal spinal cord and cauda   equina with conus medullaris at L1. Rightward lumbar curve. Nerve roots of the cauda equina are satisfactory without nodularity thickening. No retroperitoneal solid mass or adenopathy. Symmetric psoas appearance. Normal caliber abdominal aorta.   Satisfactory renal contours.     T12-L1: Moderate loss of disc height with annular bulge. No disc herniation, spinal stenosis or foraminal narrowing is present. Facet joints are satisfactory.      L1-L2: Normal disc height and signal. Mild annular bulge. No herniation. Normal facets. No spinal canal or neural foraminal stenosis.     L2-L3: Mild loss of disc height with annular bulge. No disc herniation. No spinal stenosis. Mild foraminal narrowing bilaterally. Mild facet joint hypertrophic changes are present.      L3-L4: Mild loss of disc height. Low-grade degenerative anterior subluxation. Generalized disc bulge. No disc herniation. Mild foraminal narrowing inferiorly. No spinal stenosis. Mild facet joint arthropathy bilaterally.     L4-L5: Normal disc height and signal. Annular bulge. No herniation. Normal facets. No spinal canal or right neural foraminal stenosis. Mild left  foraminal narrowing.     L5-S1: Partial sacralization on the left. Interspace narrowing on a congenital/developmental basis with disc desiccation. No disc herniation. No spinal stenosis or foraminal narrowing.                                                                      IMPRESSION:  1.  See above for counting nomenclature, stable from prior body CT examination 08/30/2023 with partial sacralization left L5. Correlate with plain film imaging if there is future plan for intervention.     2.  Low-grade degenerative anterior subluxation L3 upon L4. No high-grade subluxations. No marrow or ligamentous edema.     3.  No evidence for severe spinal stenosis or severe foraminal compromise at any lumbar level.     4.  See above for additional details and full level by level description.       This note was dictated using voice recognition software. Any grammatical or context distortions are unintentional and inherent to the software.       Sara ASKEWP-C  [facility]  O. [phone]             Again, thank you for allowing me to participate in the care of your patient.        Sincerely,        Sara Reina, NINA CNP    Electronically signed

## 2025-06-12 NOTE — PATIENT INSTRUCTIONS
Imaging (Xray, CT, or MRI) has been ordered today.   Radiology will call you to schedule. Please call below if you do not hear from them in the next couple of days.     [facility] Radiology Scheduling:  Please call [phone] to schedule your image(s) (select option #1).    There are 3 different locations you may go as a walk-in to have same-day Xrays done:    [facility]  [address] [id number]    [facility] [place]  [address], [address]   [facility] [facility] [address]    [place] [id number] [place] [id number]       Radicular Pain    Radicular pain in either the arm or leg is usually from a bulging disc in the spine. A portion of the herniated disc may press against the nerves as the nerves exit the spine. This causes pain which is felt down the arm or leg. Other causes of radicular pain may include:  Fractures.  Heart disease.  Cancer.  An abnormal and usually degenerative state of the nervous system or nerves (neuropathy).    In most cases, radicular pain is treated without imaging unless symptoms do not start to improve. If that is the case, your provider may order a CT or MRI scan to determine the cause.     Nerves in the cervical spine (neck) may cause radicular pain into the outer shoulder and down the arm. It can spread down to the thumb and fingers. The symptoms vary depending on which nerve root has been affected. In most cases, radicular pain improves with conservative treatment such as physical therapy, cervical traction, medications, and epidural steroid injections. A program for neck rehabilitation with stretching and strengthening exercises is an important part of management. Treatment may take months, and surgery may be considered as a last resort if the symptoms do not improve.    Nerves in the lumbar spine (lower back) may cause radicular pain into the hip and down the leg. The commonly used term for this type of  "pain is \"sciatica\". Conservative treatment is also recommended for this problem. Most patients feel better after 2 to 4 weeks of rest and other supportive care. You should avoid bending, lifting, and all other activities which can make your pain worse. Physical therapy, traction, medications, and epidural steroid injections can be good options to help with your recovery. A program for back injury rehabilitation with stretching and strengthening exercises is an important part of management. Surgery may be considered as a last resort if symptoms do not improve with conservative treatment.     You may take over-the-counter or prescription medicines for pain, discomfort, or fever as directed by your caregiver. Muscle relaxants may help by relieving more severe pain and spasm. Neuropathic medication (such as gabapentin, lyrica, or cymbalta) can help decrease your symptoms of nerve pain as well. Cold or massage can also give significant relief. Spinal manipulation is not recommended as it can increase the degree of disc protrusion. We do not recommend taking narcotic medication such as percocet, oxycodone, norco, dilaudid, or others unless pain is severe and not controlled with any other oral options.    Epidural steroid injections are often effective treatment for radicular pain. These injections deliver strong anti-inflammatory medicine to the area directly around the nerve root in the space between your back bones (vertebrae). Your provider can give you more information about steroid injections. These injections are most effective when given within two weeks of the onset of acute pain. You should see your provider for follow up care as recommended.     In most cases, radicular pain is treated without imaging unless symptoms do not start to improve. If that is the case, your provider may order a CT or MRI scan to determine the cause.     SEEK IMMEDIATE MEDICAL CARE IF:  You develop increased pain, weakness, or numbness " in your arm or leg.  You develop difficulty with bladder or bowel control.  You develop abdominal pain.    Document Released: 01/25/2006 Document Revised: 03/11/2013 Document Reviewed: 04/12/2010  Patient Information  2013 [email or web address].       ~Please call our [facility] Nurse Navigation line [phone] with any questions or concerns about your treatment plan, if symptoms worsen and you would like to be seen urgently, or if you have any new or worsening numbness, weakness, or problems controlling bladder and bowel function.  ~You are also welcome to contact Sara Reina via [employer], but please be aware that responses to [employer] message may take 2-3 days due to the high volume of patients seen in clinic.

## 2025-06-12 NOTE — PROGRESS NOTES
ASSESSMENT: Nathalie Bashir is a 59 year old female who presents for consultation at the request of PCP Marianne Gonzalez, who presents today for new patient evaluation of:    -Acute midline low back pain without sciatica/ Teddy @ [facility]/ no imaging, L4-L5 decompression 2011? & SI Joint Fusion 2009? Dr Rinku Phillip    Patient has generalized sensory loss from distal thighs all the way down both legs and feet which she states is chronic. 1/4 R patellar reflex, 0/4 L patellar reflex, 0/4 jose achilles. Increased back pain with extension, does have some lower lumbar point tenderness and left lower paraspinal musculature tenderness. Given hx cancer and surgery recommend updated lumbar MRI imaging for which to use to consider additional injections. Will review results and call patient. Consider follow up in person to review options.        6/7/2025     8:42 AM   OSWESTRY DISABILITY INDEX   Count 10    Sum 20    Oswestry Score (%) 40 %        Patient-reported            Diagnoses and all orders for this visit:  Malignant neoplasm of pyloric antrum (H)  -     MR Lumbar Spine w/o & w Contrast; Future  Peripheral neuropathy due to chemotherapy  Lumbar radiculopathy  -     MR Lumbar Spine w/o & w Contrast; Future      PLAN:  Reviewed spine anatomy and disease process. Discussed diagnosis and treatment options with the patient today. A shared decision making model was used.  The patient's values and choices were respected. The following represents what was discussed and decided upon by the provider and the patient.      -DIAGNOSTIC TESTS:  Images were personally reviewed and interpreted and explained to patient today using a spine model.   -Lumbar MRI with and without contrast orders placed      -PHYSICAL THERAPY:    --has completed PT in the past  -ongoing PT exercises causing worsening pain   Discussed the importance of core strengthening, ROM, stretching exercises with the patient and how each of these entities is important in  decreasing pain.  Explained to the patient that the purpose of physical therapy is to teach the patient a home exercise program.  These exercises need to be performed every day in order to decrease pain and prevent future occurrences of pain.        -MEDICATIONS:    --continue current medications at this time      -INTERVENTIONS:    -Did not discuss the role of injections with patient today. Patient would be a good candidate in the future for either epidural steroid injections or additional medial branch blocks/RFA at other levels if indicated based on symptoms and supported by imaging results.    -PATIENT EDUCATION:  Total time of 40 minutes, on the day of service, spent with the patient, reviewing the chart, placing orders, and documenting.   -Today we also discussed the pros and cons of the current treatment plan.    -FOLLOW-UP:   we will call with imaging results    Advised patient to call the [facility] if symptoms worsen, new numbness or weakness develops in the legs, or if they develop new or worsening problems controlling bladder or bowel function.   ______________________________________________________________________    SUBJECTIVE:    HPI:  Nathalie Bashir  Is a 59 year old female hx gastric adenocarcinoma, whipple procedure, peripheral neuropathy dt chemo, osteoarthritis, chronic pain, long term use of opiate analgesic, frequent UTI, left total hip replacement 2015, left SI joint fusion 2009, L4-5 decompression in 2012 who presents today for new patient evaluation of Acute midline low back pain without sciatica    Referral from urgent care visit 6/2 for evaluation of acute lower back pain. Recent UTI bringing her to ED 5/30 9/10 pain and given antibiotics.  She reports constant bilateral lower back pain, severe nagging pain. It is around the clock. Today it is a 6/10. Sometimes the pain goes down the left leg past the knee. Sometimes the right side acts up as well.   She has been more tired, taking  more naps. She tries to get everything done before noon because by noon the pain is so bad she is not able to do anything. She has to lie down often because it takes some pressure off of the tailbone area and lower back which helps.    Mornings are better. Sometimes her back hurts so bad she is not able to take a shower or wash her hair. It improves somewhat with sitting. She has pain in the car, has to adjust her position quite often. She reports multiyear chronic numbness of the rectal area, not able to feel when she stools. This was present prior to her SI joint fusion in 2009.  She does not note any change in her bladder control or ability to feet when she urinates. She reports chronic numbness of the mid thigh down of both legs due to chemo. She has chronic weakness in the left leg because of her left total hip replacement. She was told she also needs a hip replacement on the right side but has not had this done yet - did the left one first.    Prev seen in 2023 by Dr Urbina    Last PT was spring 2025 for her shoulder only.  [facility] for her lower back  around 2011 after her lumbar surgery  She is hypermobile and PT exercises have historically made her back symptoms worse and she still does these    Has been following with the pain team - Desi Albright.   She takes flexeril 10mg TID PRN which helps some. hydroxyzine 25mg 1-2 tabs QID anxiety and pain, which does help some. amitriptyline, celexa for depression and anxiety, oxycodone 5mg TID. She has tried norco, gabapentin, lyrica, cymbalta, celebrex, tylenol in the past with side effects. She gets diarrhea with tylenol. Voltaren gel is helpful. She is not able to take any NSAIDs or tylenol due to her history of GI surgeries.    She recently had jose L3-4-5 RFA 4/23/25 pain has been worse since then, no difference  Had a C7-T1 IL MORGAN 1/15/25 (70% pain relief) and on 7/10/24  R shoulder injection 12/11/24 (not much relief)  She had  some lumbar epidural steroid injections in [id number]    Last lumbar MRI without contrast done in 2023      -Treatment to Date:     -Medications:    Current Outpatient Medications   Medication Sig Dispense Refill    amitriptyline (ELAVIL) 75 MG tablet TAKE 1 TABLET (75 MG) BY MOUTH AT BEDTIME 30 tablet 3    citalopram (CELEXA) 20 MG tablet Take 1.5 tablets (30 mg) by mouth daily 135 tablet 1    cyclobenzaprine (FLEXERIL) 10 MG tablet TAKE 1 TABLET BY MOUTH THREE TIMES A DAY AS NEEDED FOR MUSCLE SPASM 90 tablet 3    famotidine (PEPCID) 20 MG tablet TAKE 1 TABLET (20 MG) BY MOUTH 2 TIMES DAILY AS NEEDED 180 tablet 1    fluticasone (FLONASE) 50 MCG/ACT nasal spray Spray 1 spray into both nostrils daily at 2 pm      nitroFURantoin macrocrystal-monohydrate (MACROBID) 100 MG capsule Take 1 capsule (100 mg) by mouth 2 times daily. 14 capsule 0    ondansetron (ZOFRAN ODT) 4 MG ODT tab Take 1 tablet (4 mg) by mouth every 6 hours as needed for nausea or vomiting. 10 tablet 0    ondansetron (ZOFRAN) 8 MG tablet Take 1 tablet (8 mg) by mouth every 8 hours as needed (Nausea/Vomiting) 90 tablet 2    oxyCODONE (ROXICODONE) 5 MG tablet Take 1 tablet (5 mg) by mouth 3 times daily as needed for severe pain. Fill 06/16/25 for start 06/18/25 90 tablet 0    oxyCODONE (ROXICODONE) 5 MG tablet Take 1 tablet (5 mg) by mouth every 8 hours as needed for pain. 9 tablet 0    pantoprazole (PROTONIX) 40 MG EC tablet TAKE 1 TABLET BY MOUTH TWICE A  tablet 1    phenazopyridine (PYRIDIUM) 100 MG tablet Take 1 tablet (100 mg) by mouth 3 times daily as needed for urinary tract discomfort. 9 tablet 0    sennosides (SENOKOT) 8.6 MG tablet 1-2 tablets 1-2 times daily as needed. 120 tablet 1    simethicone 80 MG TABS Take 1 tablet by mouth every 6 hours as needed (bloating, gas, belching) 90 tablet 3    SUMAtriptan (IMITREX) 100 MG tablet Take 100 mg by mouth at onset of headache for migraine       No current facility-administered medications for  this visit.       Allergies   Allergen Reactions    Gluten Meal Palpitations    Amoxicillin-Pot Clavulanate Rash     Patient tolerated Zosyn in 7/2020    Oxycontin [Oxycodone] Other (See Comments)     Confusion, loopy, oxycodone is tolerated.    Pt states she tolerates regular oxycodone, cannot take 12 hour oxycontin.      Pregabalin      interfered with Cymbalta    Topiramate      Tongue numbness, taste alteration, irritable     Acetaminophen Nausea     Urine retention      Dust Mite Extract     Gabapentin Dizziness and GI Disturbance    Ketorolac Headache    Latex Rash    Methadone Fatigue    Mold     Naprosyn [Naproxen] Dizziness and GI Disturbance    Seasonal Allergies        Past Medical History:   Diagnosis Date    Allergic rhinitis     Cancer (H)     stomach cancer    Chronic pain     Closed fracture of distal end of left radius     Depression     Gastric adenocarcinoma (H)     Lumbago     Migraine     Opioid dependence (H)     Other chronic pain     low back    Sacroiliitis     low back pain    Trochanteric bursitis     leg length discrrepancy, hip pain        Patient Active Problem List   Diagnosis    Lower limb length difference    IPMN (intraductal papillary mucinous neoplasm)    Allergic rhinitis    Arthralgia of hip    Bursitis    Chronic neck pain    Chronic pain disorder    Continuous opioid dependence (H)    Controlled substance agreement signed    Debility    Degeneration of intervertebral disc of lumbosacral region    Depression    Disorder of sacrum    Greater trochanteric bursitis of left hip    Infected prosthesis of left hip    Chronic bilateral low back pain without sciatica    MDD (major depressive disorder), recurrent, in partial remission    Migraine with aura    Sacroiliitis    Screening for malignant neoplasm of cervix    Spondylosis of lumbosacral spine without myelopathy    Status post left hip replacement    Cervical radiculopathy, chronic    Unilateral inguinal hernia without  obstruction or gangrene    Long term (current) use of opiate analgesic    Low urine output    Hypophosphatemia    Leukocytosis    Intra-abdominal fluid collection    Bacteremia due to Gram-negative bacteria    Malnutrition    Gastric adenocarcinoma (H)    Pneumonia    Mucositis    Chemotherapy-induced neutropenia    Gastric cancer (H)    Cholangitis (H)    Thrombocytopenia    Urinary tract infection    Crohn's disease of small intestine (H)    Jejunitis    Colitis    Abdominal pain    Malignant neoplasm of overlapping sites of stomach (H)    Iron deficiency anemia    Anemia    Paralytic ileus (H)    Hypokalemia    Nausea & vomiting    Dysphagia    History of gastric surgery    Chemotherapy-induced neuropathy    Bilateral hand pain    Pyelonephritis    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)    Other closed intra-articular fracture of distal end of left radius, initial encounter    Lumbar facet arthropathy    Lumbar spondylosis    H/O Whipple procedure       Past Surgical History:   Procedure Laterality Date    ARTHROPLASTY HIP Left 2016    BACK SURGERY      L4-5 decompression    CATARACT IOL, RT/LT      CHOLECYSTECTOMY N/A 1/28/2020    Procedure: Cholecystectomy;  Surgeon: Yandel Mallory MD;  Location: [facility] OR    ENDOSCOPIC RETROGRADE CHOLANGIOPANCREATOGRAM N/A 7/27/2020    Procedure: ENDOSCOPIC RETROGRADE CHOLANGIOPANCREATOGRAPHY  **Latex Allergy** with jejunal biopsies;  Surgeon: Jeet Aviles MD;  Location:  OR    ESOPHAGOSCOPY, GASTROSCOPY, DUODENOSCOPY (EGD), COMBINED N/A 3/3/2020    Procedure: ESOPHAGOGASTRODUODENOSCOPY, WITH ENDOSCOPIC US (enoxaparin);  Surgeon: Bakari Plata MD;  Location:  GI    ESOPHAGOSCOPY, GASTROSCOPY, DUODENOSCOPY (EGD), COMBINED N/A 12/7/2020    Procedure: Upper Endoscopy with stent placement;  Surgeon: Jeet Aviles MD;  Location:  OR    ESOPHAGOSCOPY, GASTROSCOPY, DUODENOSCOPY (EGD), COMBINED N/A 2/8/2021    Procedure:  ESOPHAGOGASTRODUODENOSCOPY (EGD)AND STENT REMOVAL;  Surgeon: Jeet Aviles MD;  Location:  OR    EYE SURGERY      GASTRECTOMY N/A 10/21/2020    Procedure: Open subtotal gastectomy with David en Y Reconstruction; D1 Lymph Node Disection  **Latex Allergy**;  Surgeon: Yandel Mallory MD;  Location: [facility] OR    INJECT BLOCK MEDIAL BRANCH CERVICAL/THORACIC/LUMBAR Bilateral 9/27/2023    Procedure: BILATERAL L4-5, L5-S1 medial branch block #1;  Surgeon: Maria Victoria Urbina MD;  Location: [facility] OR    IR CHEST PORT PLACEMENT > 5 YRS OF AGE  3/26/2020    IR LUMBAR EPIDURAL STEROID INJECTION  8/25/2009    IR LUMBAR EPIDURAL STEROID INJECTION  9/2/2009    IR LUMBAR EPIDURAL STEROID INJECTION  9/25/2009    IR LUMBAR EPIDURAL STEROID INJECTION  11/23/2009    IR LUMBAR EPIDURAL STEROID INJECTION  4/1/2010    IR LUMBAR EPIDURAL STEROID INJECTION  9/1/2010    IR LUMBAR EPIDURAL STEROID INJECTION  3/10/2011    IR LUMBAR EPIDURAL STEROID INJECTION  8/30/2011    IR PICC PLACEMENT > 5 YRS OF AGE  10/12/2016    IR PORT REMOVAL RIGHT  12/8/2023    LAPAROSCOPY DIAGNOSTIC (GENERAL) N/A 10/13/2020    Procedure: Diagnostic laparoscopy with peritoneal washings  **Latex Allergy**;  Surgeon: Yandel Mallory MD;  Location: [facility] OR    OPEN REDUCTION INTERNAL FIXATION RODDING INTRAMEDULLARY FEMUR Left 12/23/2014    Procedure: OPEN REDUCTION INTERNAL FIXATION RODDING INTRAMEDULLARY FEMUR;  Surgeon: Arnol Santiago MD;  Location: [facility] OR    OPEN REDUCTION INTERNAL FIXATION WRIST Left 9/22/2022    Procedure: LEFT WRIST OPEN REDUCTION INTERNAL FIXATION, FRACTURE;  Surgeon: Mark Villalobos MD;  Location: [facility] OR    ORTHOPEDIC SURGERY      PICC DOUBLE LUMEN PLACEMENT Right 02/07/2020    5 fr double lumen 41 cm    RI LAMINEC/FACETECT/FORAMIN,LUMBAR 1 SEG      Description: Laminectomy Decompress, Facetectomy, Foraminotomy Lumbar Seg;  Recorded: 04/12/2012;    REMOVE HARDWARE LOWER EXTREMITY Left 4/7/2015    Procedure: REMOVE HARDWARE LOWER EXTREMITY;  Surgeon:  "Arnol Santiago MD;  Location: [facility] OR    REMOVE HARDWARE RODDING INTRAMEDULLARY FEMUR Left 12/17/2015    Procedure: REMOVE HARDWARE RODDING INTRAMEDULLARY FEMUR;  Surgeon: Arnol Santiago MD;  [facility]: [facility] OR    RESECT BONE LOWER EXTREMITY Left 12/23/2014    Procedure: RESECT BONE LOWER EXTREMITY;  Surgeon: Arnol Santiago MD;  Location: [facility] OR    SHORTENING OSTEOPLASTY FEMUR W/ IM NAILING      WHIPPLE PROCEDURE N/A 1/28/2020    Procedure: Open Whipple procedure and Cholecystectomy;  Surgeon: Yandel Mallory MD;  Location: [facility] OR    WHIPPLE PROCEDURE      ZZC FUSION OF SACROILIAC JOINT      Description: Arthrodesis Sacroiliac Joint Left;  Recorded: 10/07/2011;    ZZC REPAIR DETACH RETINA,SCLERAL BUCKLE         Family History   Problem Relation Age of Onset    Heart Failure Mother 77    Dementia Father         frontal lobe    No Known Problems Sister     No Known Problems Brother     Anesthesia Reaction No family hx of     Deep Vein Thrombosis No family hx of        Reviewed past medical, surgical, and family history with patient found on new patient intake packet located in EMR Media tab.     SOCIAL HX: nonsmoker, no alcohol use    ROS: positive for headache, color changes in hands and feet, abdominal pain, diarrhea, constipation, blood in the urine, joint pain, itching, imbalance, easy bruising, anxiety, depression. Specifically negative for bowel/bladder dysfunction, balance changes, headache, dizziness, foot drop, fevers, chills, appetite changes, nausea/vomiting, unexplained weight loss. Otherwise 13 systems reviewed are negative. Please see the patient's intake questionnaire from today for details.    OBJECTIVE:  /60   Pulse 95   Ht 5' 7.5\" (1.715 m)   Wt 160 lb (72.6 kg)   LMP 09/23/2014   BMI 24.69 kg/m      PHYSICAL EXAMINATION:    --CONSTITUTIONAL:  Vital signs as above.  No acute distress.  The patient is well nourished and well groomed.  --PSYCHIATRIC:  Appropriate mood and affect. The " patient is awake, alert, oriented to person, place, time and answering questions appropriately with clear speech.    --SKIN:  Skin over the face, bilateral lower extremities, and posterior torso is clean, dry, intact without rashes.    --RESPIRATORY: Normal rhythm and effort. No abnormal accessory muscle breathing patterns noted.   --ABDOMINAL:  Non-distended.    --GROSS MOTOR: Gait is with left limp. Easily arises from a seated position. Toe walking and heel walking are normal without significant difficulty.      --LOWER EXTREMITY MOTOR TESTING:  Hip flexion: right 5/5, left 5/5  Quads: right 5/5, left 5/5  Hamstrings: right 5/5, left 5/5  Dorsiflexion: right 5/5, left 5/5  Plantar flexion: right 5/5, left 5/5    Great toe MTP extension/EHL: right 5/5, left 5/5    --NEUROLOGICAL:  0/4 R 1/4 L patellar and 0/4 achilles reflexes bilaterally. Sensation to light touch is decreased to light touch mid thigh down both legs. Sensation is normal in prox anterior thighs only. Babinski is negative. No clonus.    --MUSCULOSKELETAL: Lumbar spine inspection reveals no evidence of deformity. Range of motion is  limited in lumbar flexion, extension. positive lower lumbar point tenderness and left lower paraspinal musculature tenderness    Straight leg raising is negative.    --SACROILIAC JOINT: One finger point test was negative. Negative SI joint tenderness to palpation bilaterally.    --VASCULAR:  Bilateral lower extremities are warm without any discoloration.  There is no pitting edema of the bilateral lower extremities.    RESULTS:   Prior medical records from [facility] Care Everywhere were reviewed today.    Imaging: Spine imaging was personally reviewed and interpreted today. The images were shown to the patient and the findings were explained using a spine model.      Abd/pelvis CT no contrast - [facility] Protocol  Result Date: 5/30/2025  EXAM: CT ABDOMEN PELVIS W/O CONTRAST LOCATION: [facility]  HOSPITAL DATE: 5/30/2025 INDICATION: Left flank and left lower quadrant pain COMPARISON: CT abdomen pelvis 10/20/2024 TECHNIQUE: CT scan of the abdomen and pelvis was performed without IV contrast. Multiplanar reformats were obtained. Dose reduction techniques were used. CONTRAST: None. FINDINGS: LOWER CHEST: Normal. HEPATOBILIARY: Normal unenhanced liver contours. Cholecystectomy. PANCREAS: Postoperative changes from Whipple. No stranding around the remaining pancreas. SPLEEN: Normal. ADRENAL GLANDS: Normal. KIDNEYS/BLADDER: Normal unenhanced renal contours. No urolithiasis or collecting system dilation. Minimally distended bladder. BOWEL: Postoperative changes from Whipple. Scattered colonic diverticulosis without evidence of diverticulitis. Normal appendix. No free fluid or free air. LYMPH NODES: No lymphadenopathy. VASCULATURE: The abdominal aorta is nonaneurysmal with moderate atheromatous disease. PELVIC ORGANS: Uterus is present. No adnexal mass. MUSCULOSKELETAL: Degenerative changes of the spine. Left hip replacement and left SI joint fusion.     IMPRESSION: 1.  No acute abnormality in the abdomen or pelvis on noncontrast CT. 2.  Additional chronic noncritical details in the findings.     Narrative & Impression   EXAM: MR LUMBAR SPINE W/O CONTRAST  LOCATION: [facility]  DATE: 9/3/2023     INDICATION: History of lumbar lami (+10yrs), primarily axial LBP>BLE radicular pain.  COMPARISON: Plain film imaging 08/29/2023 of the lumbar spine, prior body CT 08/30/2023 also reviewed.  TECHNIQUE: Routine Lumbar Spine MRI without IV contrast.     FINDINGS:   Nomenclature is based on 5 lumbar type vertebral bodies. Rudimentary 12th ribs are identified. Partial sacralization left L5. Correlate with plain film imaging if there is future plan for intervention. Satisfactory lumbar vertebral body height. 2 mm   degenerative anterior subluxation L3-L4 as on prior body CT. No high-grade subluxations. No  marrow or ligamentous edema. Nothing for sacral insufficiency or stress fracture. Nothing for a marrow-infiltrative process. Benign vertebral body hemangioma at   L1. Interspace narrowing lower thoracic level and at L3-L4. Congenital narrowing of the L5-S1 interspace. Left SI joint space fusion hardware with degenerative changes right SI joint. Sacral neural foramina are intact. Normal distal spinal cord and cauda   equina with conus medullaris at L1. Rightward lumbar curve. Nerve roots of the cauda equina are satisfactory without nodularity thickening. No retroperitoneal solid mass or adenopathy. Symmetric psoas appearance. Normal caliber abdominal aorta.   Satisfactory renal contours.     T12-L1: Moderate loss of disc height with annular bulge. No disc herniation, spinal stenosis or foraminal narrowing is present. Facet joints are satisfactory.      L1-L2: Normal disc height and signal. Mild annular bulge. No herniation. Normal facets. No spinal canal or neural foraminal stenosis.     L2-L3: Mild loss of disc height with annular bulge. No disc herniation. No spinal stenosis. Mild foraminal narrowing bilaterally. Mild facet joint hypertrophic changes are present.      L3-L4: Mild loss of disc height. Low-grade degenerative anterior subluxation. Generalized disc bulge. No disc herniation. Mild foraminal narrowing inferiorly. No spinal stenosis. Mild facet joint arthropathy bilaterally.     L4-L5: Normal disc height and signal. Annular bulge. No herniation. Normal facets. No spinal canal or right neural foraminal stenosis. Mild left foraminal narrowing.     L5-S1: Partial sacralization on the left. Interspace narrowing on a congenital/developmental basis with disc desiccation. No disc herniation. No spinal stenosis or foraminal narrowing.                                                                      IMPRESSION:  1.  See above for counting nomenclature, stable from prior body CT examination 08/30/2023 with  partial sacralization left L5. Correlate with plain film imaging if there is future plan for intervention.     2.  Low-grade degenerative anterior subluxation L3 upon L4. No high-grade subluxations. No marrow or ligamentous edema.     3.  No evidence for severe spinal stenosis or severe foraminal compromise at any lumbar level.     4.  See above for additional details and full level by level description.       This note was dictated using voice recognition software. Any grammatical or context distortions are unintentional and inherent to the software.       Sara Reina FNP-C  [facility]  O. [phone]

## 2025-06-15 ENCOUNTER — HOSPITAL ENCOUNTER (OUTPATIENT)
Dept: MRI IMAGING | Facility: HOSPITAL | Age: 60
Discharge: HOME | End: 2025-06-15
Attending: NURSE PRACTITIONER | Admitting: NURSE PRACTITIONER
Payer: MEDICARE

## 2025-06-15 DIAGNOSIS — C16.3 MALIGNANT NEOPLASM OF PYLORIC ANTRUM (H): ICD-10-CM

## 2025-06-15 DIAGNOSIS — M54.16 LUMBAR RADICULOPATHY: ICD-10-CM

## 2025-06-15 PROCEDURE — 72158 MRI LUMBAR SPINE W/O & W/DYE: CPT

## 2025-06-15 PROCEDURE — 255N000002 HC RX 255 OP 636: Performed by: NURSE PRACTITIONER

## 2025-06-15 PROCEDURE — A9585 GADOBUTROL INJECTION: HCPCS | Performed by: NURSE PRACTITIONER

## 2025-06-15 RX ORDER — GADOBUTROL 604.72 MG/ML
0.1 INJECTION INTRAVENOUS ONCE
Status: COMPLETED | OUTPATIENT
Start: 2025-06-15 | End: 2025-06-15

## 2025-06-15 RX ADMIN — GADOBUTROL 7 ML: 604.72 INJECTION INTRAVENOUS at 14:48

## 2025-06-18 ENCOUNTER — OFFICE VISIT (OUTPATIENT)
Dept: PHYSICAL MEDICINE AND REHAB | Facility: CLINIC | Age: 60
End: 2025-06-18
Payer: MEDICARE

## 2025-06-18 VITALS — DIASTOLIC BLOOD PRESSURE: 65 MMHG | HEART RATE: 103 BPM | SYSTOLIC BLOOD PRESSURE: 120 MMHG

## 2025-06-18 DIAGNOSIS — M54.50 ACUTE MIDLINE LOW BACK PAIN WITHOUT SCIATICA: ICD-10-CM

## 2025-06-18 DIAGNOSIS — M54.16 LUMBAR RADICULOPATHY: Primary | ICD-10-CM

## 2025-06-18 PROCEDURE — 3078F DIAST BP <80 MM HG: CPT | Performed by: NURSE PRACTITIONER

## 2025-06-18 PROCEDURE — 3074F SYST BP LT 130 MM HG: CPT | Performed by: NURSE PRACTITIONER

## 2025-06-18 PROCEDURE — 99214 OFFICE O/P EST MOD 30 MIN: CPT | Performed by: NURSE PRACTITIONER

## 2025-06-18 PROCEDURE — 1125F AMNT PAIN NOTED PAIN PRSNT: CPT | Performed by: NURSE PRACTITIONER

## 2025-06-18 ASSESSMENT — PAIN SCALES - GENERAL: PAINLEVEL_OUTOF10: SEVERE PAIN (7)

## 2025-06-18 NOTE — PATIENT INSTRUCTIONS
~You have been referred for Physical Therapy to [facility]. They will call you to schedule an appointment.      Scheduling phone number is [phone] for [facility], or [place] location.  If you have not heard from the scheduling office within 2 business days, please call [phone] for ALL other locations.    Discussed the importance of core strengthening, ROM, stretching exercises and how each of these entities is important in decreasing pain and improving long term spine health.  The purpose of physical therapy is to teach you an individualized home exercise program.  These exercises need to be performed every day in order to decrease pain and prevent future occurrences of pain.           Radicular Pain    Radicular pain in either the arm or leg is usually from a bulging disc in the spine. A portion of the herniated disc may press against the nerves as the nerves exit the spine. This causes pain which is felt down the arm or leg. Other causes of radicular pain may include:  Fractures.  Heart disease.  Cancer.  An abnormal and usually degenerative state of the nervous system or nerves (neuropathy).    In most cases, radicular pain is treated without imaging unless symptoms do not start to improve. If that is the case, your provider may order a CT or MRI scan to determine the cause.     Nerves in the cervical spine (neck) may cause radicular pain into the outer shoulder and down the arm. It can spread down to the thumb and fingers. The symptoms vary depending on which nerve root has been affected. In most cases, radicular pain improves with conservative treatment such as physical therapy, cervical traction, medications, and epidural steroid injections. A program for neck rehabilitation with stretching and strengthening exercises is an important part of management. Treatment may take months, and surgery may be considered as a last resort if the symptoms do not  "improve.    Nerves in the lumbar spine (lower back) may cause radicular pain into the hip and down the leg. The commonly used term for this type of pain is \"sciatica\". Conservative treatment is also recommended for this problem. Most patients feel better after 2 to 4 weeks of rest and other supportive care. You should avoid bending, lifting, and all other activities which can make your pain worse. Physical therapy, traction, medications, and epidural steroid injections can be good options to help with your recovery. A program for back injury rehabilitation with stretching and strengthening exercises is an important part of management. Surgery may be considered as a last resort if symptoms do not improve with conservative treatment.     You may take over-the-counter or prescription medicines for pain, discomfort, or fever as directed by your caregiver. Muscle relaxants may help by relieving more severe pain and spasm. Neuropathic medication (such as gabapentin, lyrica, or cymbalta) can help decrease your symptoms of nerve pain as well. Cold or massage can also give significant relief. Spinal manipulation is not recommended as it can increase the degree of disc protrusion. We do not recommend taking narcotic medication such as percocet, oxycodone, norco, dilaudid, or others unless pain is severe and not controlled with any other oral options.    Epidural steroid injections are often effective treatment for radicular pain. These injections deliver strong anti-inflammatory medicine to the area directly around the nerve root in the space between your back bones (vertebrae). Your provider can give you more information about steroid injections. These injections are most effective when given within two weeks of the onset of acute pain. You should see your provider for follow up care as recommended.     In most cases, radicular pain is treated without imaging unless symptoms do not start to improve. If that is the case, your " provider may order a CT or MRI scan to determine the cause.     SEEK IMMEDIATE MEDICAL CARE IF:  You develop increased pain, weakness, or numbness in your arm or leg.  You develop difficulty with bladder or bowel control.  You develop abdominal pain.    Document Released: 01/25/2006 Document Revised: 03/11/2013 Document Reviewed: 04/12/2010  Patient Information  2013 [employer].       ~Please call our [facility] Nurse Navigation line [phone] with any questions or concerns about your treatment plan, if symptoms worsen and you would like to be seen urgently, or if you have any new or worsening numbness, weakness, or problems controlling bladder and bowel function.  ~You are also welcome to contact Sara Reina via [employer], but please be aware that responses to [employer] message may take 2-3 days due to the high volume of patients seen in clinic.

## 2025-06-18 NOTE — PROGRESS NOTES
ASSESSMENT: Nathalie Bashir is a 59 year old female who presents for consultation at the request of PCP Marianne Gonzalez, who presents today for follow up patient evaluation of:    -Acute midline low back pain without sciatica/ Teddy @ [facility]/ no imaging, L4-L5 decompression 2011? & SI Joint Fusion 2009? Dr Rinku Phillip    Patient returns for MRI follow up today. We reviewed her lumbar MRI imaging results, full details below.  Moderate asymmetric left lower lumbar predominant dorsal paraspinous muscular atrophy. Wear and tear changes including disc degeneration, facet arthropathy, disc bulges and mild to moderate degrees of foraminal stenosis depending on level. No evidence of metastatic disease. No significant change since 2023.    Recommended PT for muscle strengthening given paraspinal muscle atrophy. We talked about MedX but I think this would be too intensive at this time given she has generalized weakness from her previous chemo. We could consider this in future depending on response to traditional PT          6/18/2025     7:21 AM   OSWESTRY DISABILITY INDEX   Count 10    Sum 28    Oswestry Score (%) 56 %        Patient-reported            Diagnoses and all orders for this visit:  Lumbar radiculopathy  -     PAIN Interlaminar Epidural Steroid Injection Lumbar/Sacral; Future  -     Physical Therapy  Referral; Future  Acute midline low back pain without sciatica  -     Spine  Referral          PLAN:  Reviewed spine anatomy and disease process. Discussed diagnosis and treatment options with the patient today. A shared decision making model was used.  The patient's values and choices were respected. The following represents what was discussed and decided upon by the provider and the patient.      -DIAGNOSTIC TESTS:  Images were personally reviewed and interpreted and explained to patient today using a spine model.   -no new imaging      -PHYSICAL THERAPY:    --has completed PT in the past - this was not  recently  -recommended new course of PT and orders placed.  -consider medX depending on response to traditional PT  Discussed the importance of core strengthening, ROM, stretching exercises with the patient and how each of these entities is important in decreasing pain.  Explained to the patient that the purpose of physical therapy is to teach the patient a home exercise program.  These exercises need to be performed every day in order to decrease pain and prevent future occurrences of pain.        -MEDICATIONS:    --continue current medications at this time, no changes made      -INTERVENTIONS:    -Discussed the role, risks, benefits of injections today. Recommend IL MORGAN L3-4 (level of listhesis) as next step today. Could consider other levels for steroid inj or medial branch blocks/RFA at other levels if indicated based on symptoms and supported by imaging results.    -PATIENT EDUCATION:  Total time of 20 minutes, on the day of service, spent with the patient, reviewing the chart, placing orders, and documenting.   -Today we also discussed the pros and cons of the current treatment plan.    -FOLLOW-UP:   2-4 wks post injection     Advised patient to call the [facility] if symptoms worsen, new numbness or weakness develops in the legs, or if they develop new or worsening problems controlling bladder or bowel function.   ______________________________________________________________________    SUBJECTIVE:    Nathalie returns for MRI lumbar spine review appt today.   No change in lower back and lower ext symptoms today. Pain is 7/10 today, 9 at worst, 5 at best.   She does note some soreness in the R shoulder into the upper outer arm which she attributes to the shoulder itself.         Per original visit with updated meds/inj list:    HPI:  Nathalie Bashir  Is a 59 year old female hx gastric adenocarcinoma, whipple procedure, peripheral neuropathy dt chemo, osteoarthritis, chronic pain, long term use of opiate  analgesic, frequent UTI, left total hip replacement 2015, left SI joint fusion 2009, L4-5 decompression in 2012 who presents today for new patient evaluation of Acute midline low back pain without sciatica    Referral from urgent care visit 6/2 for evaluation of acute lower back pain. Recent UTI bringing her to ED 5/30 9/10 pain and given antibiotics.  She reports constant bilateral lower back pain, severe nagging pain. It is around the clock. Today it is a 6/10. Sometimes the pain goes down the left leg past the knee. Sometimes the right side acts up as well.   She has been more tired, taking more naps. She tries to get everything done before noon because by noon the pain is so bad she is not able to do anything. She has to lie down often because it takes some pressure off of the tailbone area and lower back which helps.    Mornings are better. Sometimes her back hurts so bad she is not able to take a shower or wash her hair. It improves somewhat with sitting. She has pain in the car, has to adjust her position quite often. She reports multiyear chronic numbness of the rectal area, not able to feel when she stools. This was present prior to her SI joint fusion in 2009.  She does not note any change in her bladder control or ability to feet when she urinates. She reports chronic numbness of the mid thigh down of both legs due to chemo. She has chronic weakness in the left leg because of her left total hip replacement. She was told she also needs a hip replacement on the right side but has not had this done yet - did the left one first.    Prev seen in 2023 by Dr Urbina    Last PT was spring 2025 for her shoulder only.  [facility] for her lower back  around 2011 after her lumbar surgery  She is hypermobile and PT exercises have historically made her back symptoms worse and she still does these    Has been following with the pain team - Desi Albright.   She takes flexeril 10mg TID PRN which  helps some. hydroxyzine 25mg 1-2 tabs QID anxiety and pain, which does help some. amitriptyline, celexa for depression and anxiety, oxycodone 5mg TID. She has tried norco, gabapentin, lyrica, cymbalta, celebrex, tylenol in the past with side effects. She gets diarrhea with tylenol. Voltaren gel is helpful. She is not able to take any NSAIDs or tylenol due to her history of GI surgeries.    She recently had jose L3-4-5 RFA 4/23/25 pain has been worse since then, no difference  Had a C7-T1 IL MORGAN 1/15/25 (70% pain relief) and on 7/10/24  R shoulder injection 12/11/24 (not much relief)  She had some lumbar epidural steroid injections in 8980-1820    Last lumbar MRI without contrast done in 2023      -Treatment to Date:     -Medications:    Current Outpatient Medications   Medication Sig Dispense Refill    amitriptyline (ELAVIL) 75 MG tablet TAKE 1 TABLET (75 MG) BY MOUTH AT BEDTIME 30 tablet 3    citalopram (CELEXA) 20 MG tablet Take 1.5 tablets (30 mg) by mouth daily 135 tablet 1    cyclobenzaprine (FLEXERIL) 10 MG tablet TAKE 1 TABLET BY MOUTH THREE TIMES A DAY AS NEEDED FOR MUSCLE SPASM 90 tablet 3    famotidine (PEPCID) 20 MG tablet TAKE 1 TABLET (20 MG) BY MOUTH 2 TIMES DAILY AS NEEDED 180 tablet 1    fluticasone (FLONASE) 50 MCG/ACT nasal spray Spray 1 spray into both nostrils daily at 2 pm      nitroFURantoin macrocrystal-monohydrate (MACROBID) 100 MG capsule Take 1 capsule (100 mg) by mouth 2 times daily. 14 capsule 0    ondansetron (ZOFRAN ODT) 4 MG ODT tab Take 1 tablet (4 mg) by mouth every 6 hours as needed for nausea or vomiting. 10 tablet 0    ondansetron (ZOFRAN) 8 MG tablet Take 1 tablet (8 mg) by mouth every 8 hours as needed (Nausea/Vomiting) 90 tablet 2    oxyCODONE (ROXICODONE) 5 MG tablet Take 1 tablet (5 mg) by mouth 3 times daily as needed for severe pain. Fill 06/16/25 for start 06/18/25 90 tablet 0    pantoprazole (PROTONIX) 40 MG EC tablet TAKE 1 TABLET BY MOUTH TWICE A  tablet 1     sennosides (SENOKOT) 8.6 MG tablet 1-2 tablets 1-2 times daily as needed. 120 tablet 1    simethicone 80 MG TABS Take 1 tablet by mouth every 6 hours as needed (bloating, gas, belching) 90 tablet 3    SUMAtriptan (IMITREX) 100 MG tablet Take 100 mg by mouth at onset of headache for migraine       No current facility-administered medications for this visit.       Allergies   Allergen Reactions    Gluten Meal Palpitations    Amoxicillin-Pot Clavulanate Rash     Patient tolerated Zosyn in 7/2020    Oxycontin [Oxycodone] Other (See Comments)     Confusion, loopy, oxycodone is tolerated.    Pt states she tolerates regular oxycodone, cannot take 12 hour oxycontin.      Pregabalin      interfered with Cymbalta    Topiramate      Tongue numbness, taste alteration, irritable     Acetaminophen Nausea     Urine retention      Dust Mite Extract     Gabapentin Dizziness and GI Disturbance    Ketorolac Headache    Latex Rash    Methadone Fatigue    Mold     Naprosyn [Naproxen] Dizziness and GI Disturbance    Seasonal Allergies        Past Medical History:   Diagnosis Date    Allergic rhinitis     Cancer (H)     stomach cancer    Chronic pain     Closed fracture of distal end of left radius     Depression     Gastric adenocarcinoma (H)     Lumbago     Migraine     Opioid dependence (H)     Other chronic pain     low back    Sacroiliitis     low back pain    Trochanteric bursitis     leg length discrrepancy, hip pain        Patient Active Problem List   Diagnosis    Lower limb length difference    IPMN (intraductal papillary mucinous neoplasm)    Allergic rhinitis    Arthralgia of hip    Bursitis    Chronic neck pain    Chronic pain disorder    Continuous opioid dependence (H)    Controlled substance agreement signed    Debility    Degeneration of intervertebral disc of lumbosacral region    Depression    Disorder of sacrum    Greater trochanteric bursitis of left hip    Infected prosthesis of left hip    Chronic bilateral low back  pain without sciatica    MDD (major depressive disorder), recurrent, in partial remission    Migraine with aura    Sacroiliitis    Screening for malignant neoplasm of cervix    Spondylosis of lumbosacral spine without myelopathy    Status post left hip replacement    Cervical radiculopathy, chronic    Unilateral inguinal hernia without obstruction or gangrene    Long term (current) use of opiate analgesic    Low urine output    Hypophosphatemia    Leukocytosis    Intra-abdominal fluid collection    Bacteremia due to Gram-negative bacteria    Malnutrition    Gastric adenocarcinoma (H)    Pneumonia    Mucositis    Chemotherapy-induced neutropenia    Gastric cancer (H)    Cholangitis (H)    Thrombocytopenia    Urinary tract infection    Crohn's disease of small intestine (H)    Jejunitis    Colitis    Abdominal pain    Malignant neoplasm of overlapping sites of stomach (H)    Iron deficiency anemia    Anemia    Paralytic ileus (H)    Hypokalemia    Nausea & vomiting    Dysphagia    History of gastric surgery    Chemotherapy-induced neuropathy    Bilateral hand pain    Pyelonephritis    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)    Other closed intra-articular fracture of distal end of left radius, initial encounter    Lumbar facet arthropathy    Lumbar spondylosis    H/O Whipple procedure       Past Surgical History:   Procedure Laterality Date    ARTHROPLASTY HIP Left 2016    BACK SURGERY      L4-5 decompression    CATARACT IOL, RT/LT      CHOLECYSTECTOMY N/A 1/28/2020    Procedure: Cholecystectomy;  Surgeon: Yandel Mallory MD;  Location: [facility] OR    ENDOSCOPIC RETROGRADE CHOLANGIOPANCREATOGRAM N/A 7/27/2020    Procedure: ENDOSCOPIC RETROGRADE CHOLANGIOPANCREATOGRAPHY  **Latex Allergy** with jejunal biopsies;  Surgeon: Jeet Aviles MD;  Location: [facility] OR    ESOPHAGOSCOPY, GASTROSCOPY, DUODENOSCOPY (EGD), COMBINED N/A 3/3/2020    Procedure: ESOPHAGOGASTRODUODENOSCOPY, WITH ENDOSCOPIC US  (enoxaparin);  Surgeon: Bakari Plata MD;  Location:  GI    ESOPHAGOSCOPY, GASTROSCOPY, DUODENOSCOPY (EGD), COMBINED N/A 12/7/2020    Procedure: Upper Endoscopy with stent placement;  Surgeon: Jeet Aviles MD;  Location:  OR    ESOPHAGOSCOPY, GASTROSCOPY, DUODENOSCOPY (EGD), COMBINED N/A 2/8/2021    Procedure: ESOPHAGOGASTRODUODENOSCOPY (EGD)AND STENT REMOVAL;  Surgeon: Jeet Aviles MD;  Location:  OR    EYE SURGERY      GASTRECTOMY N/A 10/21/2020    Procedure: Open subtotal gastectomy with David en Y Reconstruction; D1 Lymph Node Disection  **Latex Allergy**;  Surgeon: Yandel Mallory MD;  Location: [facility] OR    INJECT BLOCK MEDIAL BRANCH CERVICAL/THORACIC/LUMBAR Bilateral 9/27/2023    Procedure: BILATERAL L4-5, L5-S1 medial branch block #1;  Surgeon: Maria Victoria Urbina MD;  [place] [facility] [place]    IR CHEST PORT PLACEMENT > 5 YRS OF AGE  3/26/2020    IR LUMBAR EPIDURAL STEROID INJECTION  8/25/2009    IR LUMBAR EPIDURAL STEROID INJECTION  9/2/2009    IR LUMBAR EPIDURAL STEROID INJECTION  9/25/2009    IR LUMBAR EPIDURAL STEROID INJECTION  11/23/2009    IR LUMBAR EPIDURAL STEROID INJECTION  4/1/2010    IR LUMBAR EPIDURAL STEROID INJECTION  9/1/2010    IR LUMBAR EPIDURAL STEROID INJECTION  3/10/2011    IR LUMBAR EPIDURAL STEROID INJECTION  8/30/2011    IR PICC PLACEMENT > 5 YRS OF AGE  10/12/2016    IR PORT REMOVAL RIGHT  12/8/2023    LAPAROSCOPY DIAGNOSTIC (GENERAL) N/A 10/13/2020    Procedure: Diagnostic laparoscopy with peritoneal washings  **Latex Allergy**;  Surgeon: Yandel Mallory MD;  Location: [facility] OR    OPEN REDUCTION INTERNAL FIXATION RODDING INTRAMEDULLARY FEMUR Left 12/23/2014    Procedure: OPEN REDUCTION INTERNAL FIXATION RODDING INTRAMEDULLARY FEMUR;  Surgeon: Arnol Santiago MD;  Location:  OR    OPEN REDUCTION INTERNAL FIXATION WRIST Left 9/22/2022    Procedure: LEFT WRIST OPEN REDUCTION INTERNAL FIXATION, FRACTURE;  Surgeon: Mark Villalobos MD;  [place] [facility] [place]    ORTHOPEDIC  SURGERY      PICC DOUBLE LUMEN PLACEMENT Right 02/07/2020    5 fr double lumen 41 cm    NM LAMINEC/FACETECT/FORAMIN,LUMBAR 1 SEG      Description: Laminectomy Decompress, Facetectomy, Foraminotomy Lumbar Seg;  Recorded: 04/12/2012;    REMOVE HARDWARE LOWER EXTREMITY Left 4/7/2015    Procedure: REMOVE HARDWARE LOWER EXTREMITY;  Surgeon: Arnol Santiago MD;  Location: [facility] OR    REMOVE HARDWARE RODDING INTRAMEDULLARY FEMUR Left 12/17/2015    Procedure: REMOVE HARDWARE RODDING INTRAMEDULLARY FEMUR;  Surgeon: Arnol Santiago MD;  Location: [facility] OR    RESECT BONE LOWER EXTREMITY Left 12/23/2014    Procedure: RESECT BONE LOWER EXTREMITY;  Surgeon: Arnol Santiago MD;  Location: [facility] OR    SHORTENING OSTEOPLASTY FEMUR W/ IM NAILING      WHIPPLE PROCEDURE N/A 1/28/2020    Procedure: Open Whipple procedure and Cholecystectomy;  Surgeon: Yandel Mallory MD;  Location: [facility] OR    WHIPPLE PROCEDURE      ZZC FUSION OF SACROILIAC JOINT      Description: Arthrodesis Sacroiliac Joint Left;  Recorded: 10/07/2011;    ZZC REPAIR DETACH RETINA,SCLERAL BUCKLE         Family History   Problem Relation Age of Onset    Heart Failure Mother 77    Dementia Father         frontal lobe    No Known Problems Sister     No Known Problems Brother     Anesthesia Reaction No family hx of     Deep Vein Thrombosis No family hx of        Reviewed past medical, surgical, and family history with patient found on new patient intake packet located in EMR Media tab.     SOCIAL HX: nonsmoker, no alcohol use    ROS: positive for headache, color changes in hands and feet, abdominal pain, diarrhea, constipation, blood in the urine, joint pain, itching, imbalance, easy bruising, anxiety, depression. Specifically negative for bowel/bladder dysfunction, balance changes, headache, dizziness, foot drop, fevers, chills, appetite changes, nausea/vomiting, unexplained weight loss. Otherwise 13 systems reviewed are negative. Please see the patient's intake questionnaire  from today for details.    OBJECTIVE:  /65   Pulse 103   LMP 09/23/2014     PHYSICAL EXAMINATION:    --CONSTITUTIONAL:  Vital signs as above.  No acute distress.  The patient is well nourished and well groomed.  --PSYCHIATRIC:  Appropriate mood and affect. The patient is awake, alert, oriented to person, place, time and answering questions appropriately with clear speech.    --SKIN:  Skin over the face is clean, dry, intact without rashes.    --RESPIRATORY: Normal rhythm and effort. No abnormal accessory muscle breathing patterns noted.   --ABDOMINAL:  Non-distended.      RESULTS:   Prior medical records from [facility] Everywhere were reviewed today.    Imaging: Spine imaging was personally reviewed and interpreted today. The images were shown to the patient and the findings were explained using a spine model.      MR Lumbar Spine w/o & w Contrast  Result Date: 6/17/2025  EXAM: MR LUMBAR SPINE W/O and W CONTRAST LOCATION: [facility] DATE: 6/15/2025 INDICATION: hx gastric cancer, hx lumbar surgery, bilateral lumbar radiculopathy and low back pain COMPARISON: Lumbar spine MRI 9/3/2023. CONTRAST: 7mlGadavist TECHNIQUE: Routine Lumbar Spine MRI without and with IV contrast. FINDINGS: Presumed hypoplastic/aplastic ribs at T12, there are 5 lumbar type vertebrae including a transitional lumbosacral junction segment designated as a sacralized L5 with a hypoplastic L5-S1 disc. The tip the conus medullaris is at L1-L2. The cauda equina roots and visualized lower thoracic spinal cord are within normal limits. No abnormal foci of intrathecal enhancement. Stable alignment of the lumbar vertebrae with 3 mm degenerative grade 1 anterolisthesis of L3 on L4. Slight exaggeration of the normal lumbar lordotic curvature centered at L3. Normal vertebral body heights and marrow signal. No fracture, destructive bone lesion or infection. Small T1 hyperintense intraosseous hemangiomas at L1.  Moderate asymmetric left lower lumbar predominant dorsal paraspinous muscular atrophy. Left sacroiliac fusion device with bony fusion across the joint. Findings on a level by level basis are as follows: T12-L1: Mild to moderate disc height loss. Loss of disc signal. Disc bulge. No disc herniation. Mild facet arthrosis. Mild left neural foraminal stenosis. The right neural foramen is widely patent. No significant spinal canal stenosis. L1-L2: Mild disc height loss. Loss of disc signal. Eccentric right disc bulge. No disc herniation. Facet arthrosis.. Mild to moderate right and mild left neural foraminal stenosis. No significant spinal canal stenosis. L2-L3: Mild disc height loss. Loss of disc signal. Disc bulge. No disc herniation. Facet arthrosis. Mild to moderate left and mild right neural foraminal stenosis. No significant spinal canal stenosis. L3-L4: Mild to moderate disc height loss. Loss of disc signal. Degenerative anterolisthesis with unroofed disc bulge. No disc herniation. Facet arthrosis and ligamentum flavum thickening. Mild to moderate left and mild right neural foraminal stenosis. Mild spinal canal stenosis. L4-L5: Minimal disc height loss. Partial loss of disc signal. Disc bulge. No disc herniation. Facet arthrosis. Mild to moderate left and mild right neural foraminal stenosis. No significant spinal canal stenosis. L5-S1: Normal hypoplastic disc and facets. No disc herniation. No significant spinal canal or neural foraminal stenosis.     IMPRESSION: 1.  No significant change since 9/3/2023. Stable multilevel lumbar spondylosis with degenerative grade 1 anterolisthesis of L3 on L4. 2.  Mild to moderate neural foraminal stenosis on the right at L1-L2 and on the left L2-L5. 3.  Mild spinal canal stenosis at L3-L4. 4.  No evidence of metastatic disease in the lumbar spine. 5.  Normal variant transitional lumbosacral junction anatomy. Recommend close correlation with plain films prior to any intervention.      Abd/pelvis CT no contrast - Stone Protocol  Result Date: 5/30/2025  EXAM: CT ABDOMEN PELVIS W/O CONTRAST LOCATION: [facility] DATE: 5/30/2025 INDICATION: Left flank and left lower quadrant pain COMPARISON: CT abdomen pelvis 10/20/2024 TECHNIQUE: CT scan of the abdomen and pelvis was performed without IV contrast. Multiplanar reformats were obtained. Dose reduction techniques were used. CONTRAST: None. FINDINGS: LOWER CHEST: Normal. HEPATOBILIARY: Normal unenhanced liver contours. Cholecystectomy. PANCREAS: Postoperative changes from Whipple. No stranding around the remaining pancreas. SPLEEN: Normal. ADRENAL GLANDS: Normal. KIDNEYS/BLADDER: Normal unenhanced renal contours. No urolithiasis or collecting system dilation. Minimally distended bladder. BOWEL: Postoperative changes from Whipple. Scattered colonic diverticulosis without evidence of diverticulitis. Normal appendix. No free fluid or free air. LYMPH NODES: No lymphadenopathy. VASCULATURE: The abdominal aorta is nonaneurysmal with moderate atheromatous disease. PELVIC ORGANS: Uterus is present. No adnexal mass. MUSCULOSKELETAL: Degenerative changes of the spine. Left hip replacement and left SI joint fusion.     IMPRESSION: 1.  No acute abnormality in the abdomen or pelvis on noncontrast CT. 2.  Additional chronic noncritical details in the findings.     Narrative & Impression   EXAM: MR LUMBAR SPINE W/O CONTRAST  LOCATION: [facility]  DATE: 9/3/2023     INDICATION: History of lumbar lami (+10yrs), primarily axial LBP>BLE radicular pain.  COMPARISON: Plain film imaging 08/29/2023 of the lumbar spine, prior body CT 08/30/2023 also reviewed.  TECHNIQUE: Routine Lumbar Spine MRI without IV contrast.     FINDINGS:   Nomenclature is based on 5 lumbar type vertebral bodies. Rudimentary 12th ribs are identified. Partial sacralization left L5. Correlate with plain film imaging if there is future plan for  intervention. Satisfactory lumbar vertebral body height. 2 mm   degenerative anterior subluxation L3-L4 as on prior body CT. No high-grade subluxations. No marrow or ligamentous edema. Nothing for sacral insufficiency or stress fracture. Nothing for a marrow-infiltrative process. Benign vertebral body hemangioma at   L1. Interspace narrowing lower thoracic level and at L3-L4. Congenital narrowing of the L5-S1 interspace. Left SI joint space fusion hardware with degenerative changes right SI joint. Sacral neural foramina are intact. Normal distal spinal cord and cauda   equina with conus medullaris at L1. Rightward lumbar curve. Nerve roots of the cauda equina are satisfactory without nodularity thickening. No retroperitoneal solid mass or adenopathy. Symmetric psoas appearance. Normal caliber abdominal aorta.   Satisfactory renal contours.     T12-L1: Moderate loss of disc height with annular bulge. No disc herniation, spinal stenosis or foraminal narrowing is present. Facet joints are satisfactory.      L1-L2: Normal disc height and signal. Mild annular bulge. No herniation. Normal facets. No spinal canal or neural foraminal stenosis.     L2-L3: Mild loss of disc height with annular bulge. No disc herniation. No spinal stenosis. Mild foraminal narrowing bilaterally. Mild facet joint hypertrophic changes are present.      L3-L4: Mild loss of disc height. Low-grade degenerative anterior subluxation. Generalized disc bulge. No disc herniation. Mild foraminal narrowing inferiorly. No spinal stenosis. Mild facet joint arthropathy bilaterally.     L4-L5: Normal disc height and signal. Annular bulge. No herniation. Normal facets. No spinal canal or right neural foraminal stenosis. Mild left foraminal narrowing.     L5-S1: Partial sacralization on the left. Interspace narrowing on a congenital/developmental basis with disc desiccation. No disc herniation. No spinal stenosis or foraminal narrowing.                                                                       IMPRESSION:  1.  See above for counting nomenclature, stable from prior body CT examination 08/30/2023 with partial sacralization left L5. Correlate with plain film imaging if there is future plan for intervention.     2.  Low-grade degenerative anterior subluxation L3 upon L4. No high-grade subluxations. No marrow or ligamentous edema.     3.  No evidence for severe spinal stenosis or severe foraminal compromise at any lumbar level.     4.  See above for additional details and full level by level description.       This note was dictated using voice recognition software. Any grammatical or context distortions are unintentional and inherent to the [email or web address]       Sara Reina FNP-C  [facility]  O. [phone]

## 2025-06-18 NOTE — LETTER
6/18/2025      Nathalie Bashir  [address] [facility] [address]      Dear Colleague,    Thank you for referring your patient, Nathalie Bashir, to the [facility]. Please see a copy of my visit note below.    ASSESSMENT: Nathalie Bashir is a 59 year old female who presents for consultation at the request of PCP Marianne Gonzalez, who presents today for follow up patient evaluation of:    -Acute midline low back pain without sciatica/ Teddy @ [facility]/ no imaging, L4-L5 decompression 2011? & SI Joint Fusion 2009? Dr Rinku Phillip    Patient returns for MRI follow up today. We reviewed her lumbar MRI imaging results, full details below.  Moderate asymmetric left lower lumbar predominant dorsal paraspinous muscular atrophy. Wear and tear changes including disc degeneration, facet arthropathy, disc bulges and mild to moderate degrees of foraminal stenosis depending on level. No evidence of metastatic disease. No significant change since 2023.    Recommended PT for muscle strengthening given paraspinal muscle atrophy. We talked about MedX but I think this would be too intensive at this time given she has generalized weakness from her previous chemo. We could consider this in future depending on response to traditional PT          6/18/2025     7:21 AM   OSWESTRY DISABILITY INDEX   Count 10    Sum 28    Oswestry Score (%) 56 %        Patient-reported            Diagnoses and all orders for this visit:  Lumbar radiculopathy  -     PAIN Interlaminar Epidural Steroid Injection Lumbar/Sacral; Future  -     Physical Therapy  Referral; Future  Acute midline low back pain without sciatica  -     Spine  Referral          PLAN:  Reviewed spine anatomy and disease process. Discussed diagnosis and treatment options with the patient today. A shared decision making model was used.  The patient's values and choices were respected. The following represents what was discussed and decided upon by the  provider and the patient.      -DIAGNOSTIC TESTS:  Images were personally reviewed and interpreted and explained to patient today using a spine model.   -no new imaging      -PHYSICAL THERAPY:    --has completed PT in the past - this was not recently  -recommended new course of PT and orders placed.  -consider medX depending on response to traditional PT  Discussed the importance of core strengthening, ROM, stretching exercises with the patient and how each of these entities is important in decreasing pain.  Explained to the patient that the purpose of physical therapy is to teach the patient a home exercise program.  These exercises need to be performed every day in order to decrease pain and prevent future occurrences of pain.        -MEDICATIONS:    --continue current medications at this time, no changes made      -INTERVENTIONS:    -Discussed the role, risks, benefits of injections today. Recommend IL MORGAN L3-4 (level of listhesis) as next step today. Could consider other levels for steroid inj or medial branch blocks/RFA at other levels if indicated based on symptoms and supported by imaging results.    -PATIENT EDUCATION:  Total time of 20 minutes, on the day of service, spent with the patient, reviewing the chart, placing orders, and documenting.   -Today we also discussed the pros and cons of the current treatment plan.    -FOLLOW-UP:   2-4 wks post injection     Advised patient to call the [facility] if symptoms worsen, new numbness or weakness develops in the legs, or if they develop new or worsening problems controlling bladder or bowel function.   ______________________________________________________________________    SUBJECTIVE:    Nathalie returns for MRI lumbar spine review appt today.   No change in lower back and lower ext symptoms today. Pain is 7/10 today, 9 at worst, 5 at best.   She does note some soreness in the R shoulder into the upper outer arm which she attributes to the shoulder itself.          Per original visit with updated meds/inj list:    HPI:  Nathalie Bashir  Is a 59 year old female hx gastric adenocarcinoma, whipple procedure, peripheral neuropathy dt chemo, osteoarthritis, chronic pain, long term use of opiate analgesic, frequent UTI, left total hip replacement 2015, left SI joint fusion 2009, L4-5 decompression in 2012 who presents today for new patient evaluation of Acute midline low back pain without sciatica    Referral from urgent care visit 6/2 for evaluation of acute lower back pain. Recent UTI bringing her to ED 5/30 9/10 pain and given antibiotics.  She reports constant bilateral lower back pain, severe nagging pain. It is around the clock. Today it is a 6/10. Sometimes the pain goes down the left leg past the knee. Sometimes the right side acts up as well.   She has been more tired, taking more naps. She tries to get everything done before noon because by noon the pain is so bad she is not able to do anything. She has to lie down often because it takes some pressure off of the tailbone area and lower back which helps.    Mornings are better. Sometimes her back hurts so bad she is not able to take a shower or wash her hair. It improves somewhat with sitting. She has pain in the car, has to adjust her position quite often. She reports multiyear chronic numbness of the rectal area, not able to feel when she stools. This was present prior to her SI joint fusion in 2009.  She does not note any change in her bladder control or ability to feet when she urinates. She reports chronic numbness of the mid thigh down of both legs due to chemo. She has chronic weakness in the left leg because of her left total hip replacement. She was told she also needs a hip replacement on the right side but has not had this done yet - did the left one first.    Prev seen in 2023 by Dr Urbina    Last PT was spring 2025 for her shoulder only.  [facility] her lower back  around  2011 after her lumbar surgery  She is hypermobile and PT exercises have historically made her back symptoms worse and she still does these    Has been following with the pain team - Desi Albright.   She takes flexeril 10mg TID PRN which helps some. hydroxyzine 25mg 1-2 tabs QID anxiety and pain, which does help some. amitriptyline, celexa for depression and anxiety, oxycodone 5mg TID. She has tried norco, gabapentin, lyrica, cymbalta, celebrex, tylenol in the past with side effects. She gets diarrhea with tylenol. Voltaren gel is helpful. She is not able to take any NSAIDs or tylenol due to her history of GI surgeries.    She recently had jose L3-4-5 RFA 4/23/25 pain has been worse since then, no difference  Had a C7-T1 IL MORGAN 1/15/25 (70% pain relief) and on 7/10/24  R shoulder injection 12/11/24 (not much relief)  She had some lumbar epidural steroid injections in [id number]    Last lumbar MRI without contrast done in 2023      -Treatment to Date:     -Medications:    Current Outpatient Medications   Medication Sig Dispense Refill     amitriptyline (ELAVIL) 75 MG tablet TAKE 1 TABLET (75 MG) BY MOUTH AT BEDTIME 30 tablet 3     citalopram (CELEXA) 20 MG tablet Take 1.5 tablets (30 mg) by mouth daily 135 tablet 1     cyclobenzaprine (FLEXERIL) 10 MG tablet TAKE 1 TABLET BY MOUTH THREE TIMES A DAY AS NEEDED FOR MUSCLE SPASM 90 tablet 3     famotidine (PEPCID) 20 MG tablet TAKE 1 TABLET (20 MG) BY MOUTH 2 TIMES DAILY AS NEEDED 180 tablet 1     fluticasone (FLONASE) 50 MCG/ACT nasal spray Spray 1 spray into both nostrils daily at 2 pm       nitroFURantoin macrocrystal-monohydrate (MACROBID) 100 MG capsule Take 1 capsule (100 mg) by mouth 2 times daily. 14 capsule 0     ondansetron (ZOFRAN ODT) 4 MG ODT tab Take 1 tablet (4 mg) by mouth every 6 hours as needed for nausea or vomiting. 10 tablet 0     ondansetron (ZOFRAN) 8 MG tablet Take 1 tablet (8 mg) by mouth every 8 hours as needed (Nausea/Vomiting) 90 tablet 2      oxyCODONE (ROXICODONE) 5 MG tablet Take 1 tablet (5 mg) by mouth 3 times daily as needed for severe pain. Fill 06/16/25 for start 06/18/25 90 tablet 0     pantoprazole (PROTONIX) 40 MG EC tablet TAKE 1 TABLET BY MOUTH TWICE A  tablet 1     sennosides (SENOKOT) 8.6 MG tablet 1-2 tablets 1-2 times daily as needed. 120 tablet 1     simethicone 80 MG TABS Take 1 tablet by mouth every 6 hours as needed (bloating, gas, belching) 90 tablet 3     SUMAtriptan (IMITREX) 100 MG tablet Take 100 mg by mouth at onset of headache for migraine       No current facility-administered medications for this visit.       Allergies   Allergen Reactions     Gluten Meal Palpitations     Amoxicillin-Pot Clavulanate Rash     Patient tolerated Zosyn in 7/2020     Oxycontin [Oxycodone] Other (See Comments)     Confusion, loopy, oxycodone is tolerated.    Pt states she tolerates regular oxycodone, cannot take 12 hour oxycontin.       Pregabalin      interfered with Cymbalta     Topiramate      Tongue numbness, taste alteration, irritable      Acetaminophen Nausea     Urine retention       Dust Mite Extract      Gabapentin Dizziness and GI Disturbance     Ketorolac Headache     Latex Rash     Methadone Fatigue     Mold      Naprosyn [Naproxen] Dizziness and GI Disturbance     Seasonal Allergies        Past Medical History:   Diagnosis Date     Allergic rhinitis      Cancer (H)     stomach cancer     Chronic pain      Closed fracture of distal end of left radius      Depression      Gastric adenocarcinoma (H)      Lumbago      Migraine      Opioid dependence (H)      Other chronic pain     low back     Sacroiliitis     low back pain     Trochanteric bursitis     leg length discrrepancy, hip pain        Patient Active Problem List   Diagnosis     Lower limb length difference     IPMN (intraductal papillary mucinous neoplasm)     Allergic rhinitis     Arthralgia of hip     Bursitis     Chronic neck pain     Chronic pain disorder      Continuous opioid dependence (H)     Controlled substance agreement signed     Debility     Degeneration of intervertebral disc of lumbosacral region     Depression     Disorder of sacrum     Greater trochanteric bursitis of left hip     Infected prosthesis of left hip     Chronic bilateral low back pain without sciatica     MDD (major depressive disorder), recurrent, in partial remission     Migraine with aura     Sacroiliitis     Screening for malignant neoplasm of cervix     Spondylosis of lumbosacral spine without myelopathy     Status post left hip replacement     Cervical radiculopathy, chronic     Unilateral inguinal hernia without obstruction or gangrene     Long term (current) use of opiate analgesic     Low urine output     Hypophosphatemia     Leukocytosis     Intra-abdominal fluid collection     Bacteremia due to Gram-negative bacteria     Malnutrition     Gastric adenocarcinoma (H)     Pneumonia     Mucositis     Chemotherapy-induced neutropenia     Gastric cancer (H)     Cholangitis (H)     Thrombocytopenia     Urinary tract infection     Crohn's disease of small intestine (H)     Jejunitis     Colitis     Abdominal pain     Malignant neoplasm of overlapping sites of stomach (H)     Iron deficiency anemia     Anemia     Paralytic ileus (H)     Hypokalemia     Nausea & vomiting     Dysphagia     History of gastric surgery     Chemotherapy-induced neuropathy     Bilateral hand pain     Pyelonephritis     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)     Other closed intra-articular fracture of distal end of left radius, initial encounter     Lumbar facet arthropathy     Lumbar spondylosis     H/O Whipple procedure       Past Surgical History:   Procedure Laterality Date     ARTHROPLASTY HIP Left 2016     BACK SURGERY      L4-5 decompression     CATARACT IOL, RT/LT       CHOLECYSTECTOMY N/A 1/28/2020    Procedure: Cholecystectomy;  Surgeon: Yandel Mallory MD;  Location: [facility] OR      ENDOSCOPIC RETROGRADE CHOLANGIOPANCREATOGRAM N/A 7/27/2020    Procedure: ENDOSCOPIC RETROGRADE CHOLANGIOPANCREATOGRAPHY  **Latex Allergy** with jejunal biopsies;  Surgeon: Jeet Aviles MD;  Location: [facility] OR     ESOPHAGOSCOPY, GASTROSCOPY, DUODENOSCOPY (EGD), COMBINED N/A 3/3/2020    Procedure: ESOPHAGOGASTRODUODENOSCOPY, WITH ENDOSCOPIC US (enoxaparin);  Surgeon: Bakari Plata MD;  Location: [facility] GI     ESOPHAGOSCOPY, GASTROSCOPY, DUODENOSCOPY (EGD), COMBINED N/A 12/7/2020    Procedure: Upper Endoscopy with stent placement;  Surgeon: Jeet Aviles MD;  Location:  OR     ESOPHAGOSCOPY, GASTROSCOPY, DUODENOSCOPY (EGD), COMBINED N/A 2/8/2021    Procedure: ESOPHAGOGASTRODUODENOSCOPY (EGD)AND STENT REMOVAL;  Surgeon: Jeet Aviles MD;  Location:  OR     EYE SURGERY       GASTRECTOMY N/A 10/21/2020    Procedure: Open subtotal gastectomy with David en Y Reconstruction; D1 Lymph Node Disection  **Latex Allergy**;  Surgeon: Yandel Mallory MD;  Location: [facility] OR     INJECT BLOCK MEDIAL BRANCH CERVICAL/THORACIC/LUMBAR Bilateral 9/27/2023    Procedure: BILATERAL L4-5, L5-S1 medial branch block #1;  Surgeon: Maria Victoria Urbina MD;  Location: [facility] OR     IR CHEST PORT PLACEMENT > 5 YRS OF AGE  3/26/2020     IR LUMBAR EPIDURAL STEROID INJECTION  8/25/2009     IR LUMBAR EPIDURAL STEROID INJECTION  9/2/2009     IR LUMBAR EPIDURAL STEROID INJECTION  9/25/2009     IR LUMBAR EPIDURAL STEROID INJECTION  11/23/2009     IR LUMBAR EPIDURAL STEROID INJECTION  4/1/2010     IR LUMBAR EPIDURAL STEROID INJECTION  9/1/2010     IR LUMBAR EPIDURAL STEROID INJECTION  3/10/2011     IR LUMBAR EPIDURAL STEROID INJECTION  8/30/2011     IR PICC PLACEMENT > 5 YRS OF AGE  10/12/2016     IR PORT REMOVAL RIGHT  12/8/2023     LAPAROSCOPY DIAGNOSTIC (GENERAL) N/A 10/13/2020    Procedure: Diagnostic laparoscopy with peritoneal washings  **Latex Allergy**;  Surgeon: Yandel Mallory MD;  Location: [facility] OR     OPEN REDUCTION INTERNAL  FIXATION RODDING INTRAMEDULLARY FEMUR Left 12/23/2014    Procedure: OPEN REDUCTION INTERNAL FIXATION RODDING INTRAMEDULLARY FEMUR;  Surgeon: Arnol Santiago MD;  Location: [facility] OR     OPEN REDUCTION INTERNAL FIXATION WRIST Left 9/22/2022    Procedure: LEFT WRIST OPEN REDUCTION INTERNAL FIXATION, FRACTURE;  Surgeon: Mark Villalobos MD;  Location: [facility] OR     ORTHOPEDIC SURGERY       PICC DOUBLE LUMEN PLACEMENT Right 02/07/2020    5 fr double lumen 41 cm     NV LAMINEC/FACETECT/FORAMIN,LUMBAR 1 SEG      Description: Laminectomy Decompress, Facetectomy, Foraminotomy Lumbar Seg;  Recorded: 04/12/2012;     REMOVE HARDWARE LOWER EXTREMITY Left 4/7/2015    Procedure: REMOVE HARDWARE LOWER EXTREMITY;  Surgeon: Arnol Santiago MD;  Location: [facility] OR     REMOVE HARDWARE RODDING INTRAMEDULLARY FEMUR Left 12/17/2015    Procedure: REMOVE HARDWARE RODDING INTRAMEDULLARY FEMUR;  Surgeon: Arnol Santiago MD;  [facility]: [facility] OR     RESECT BONE LOWER EXTREMITY Left 12/23/2014    Procedure: RESECT BONE LOWER EXTREMITY;  Surgeon: Arnol Santiago MD;  [facility]     SHORTENING OSTEOPLASTY FEMUR W/ IM NAILING       WHIPPLE PROCEDURE N/A 1/28/2020    Procedure: Open Whipple procedure and Cholecystectomy;  Surgeon: Yandel Mallory MD;  Location: [facility] OR     WHIPPLE PROCEDURE       ZZC FUSION OF SACROILIAC JOINT      Description: Arthrodesis Sacroiliac Joint Left;  Recorded: 10/07/2011;     ZZC REPAIR DETACH RETINA,SCLERAL BUCKLE         Family History   Problem Relation Age of Onset     Heart Failure Mother 77     Dementia Father         frontal lobe     No Known Problems Sister      No Known Problems Brother      Anesthesia Reaction No family hx of      Deep Vein Thrombosis No family hx of        Reviewed past medical, surgical, and family history with patient found on new patient intake packet located in EMR Media tab.     SOCIAL HX: nonsmoker, no alcohol use    ROS: positive for headache, color changes in hands and feet, abdominal  pain, diarrhea, constipation, blood in the urine, joint pain, itching, imbalance, easy bruising, anxiety, depression. Specifically negative for bowel/bladder dysfunction, balance changes, headache, dizziness, foot drop, fevers, chills, appetite changes, nausea/vomiting, unexplained weight loss. Otherwise 13 systems reviewed are negative. Please see the patient's intake questionnaire from today for details.    OBJECTIVE:  /65   Pulse 103   LMP 09/23/2014     PHYSICAL EXAMINATION:    --CONSTITUTIONAL:  Vital signs as above.  No acute distress.  The patient is well nourished and well groomed.  --PSYCHIATRIC:  Appropriate mood and affect. The patient is awake, alert, oriented to person, place, time and answering questions appropriately with clear speech.    --SKIN:  Skin over the face is clean, dry, intact without rashes.    --RESPIRATORY: Normal rhythm and effort. No abnormal accessory muscle breathing patterns noted.   --ABDOMINAL:  Non-distended.      RESULTS:   Prior medical records from [facility]where were reviewed today.    Imaging: Spine imaging was personally reviewed and interpreted today. The images were shown to the patient and the findings were explained using a spine model.      MR Lumbar Spine w/o & w Contrast  Result Date: 6/17/2025  EXAM: MR LUMBAR SPINE W/O and W CONTRAST LOCATION: [facility] DATE: 6/15/2025 INDICATION: hx gastric cancer, hx lumbar surgery, bilateral lumbar radiculopathy and low back pain COMPARISON: Lumbar spine MRI 9/3/2023. CONTRAST: 7mlGadavist TECHNIQUE: Routine Lumbar Spine MRI without and with IV contrast. FINDINGS: Presumed hypoplastic/aplastic ribs at T12, there are 5 lumbar type vertebrae including a transitional lumbosacral junction segment designated as a sacralized L5 with a hypoplastic L5-S1 disc. The tip the conus medullaris is at L1-L2. The cauda equina roots and visualized lower thoracic spinal cord are within  normal limits. No abnormal foci of intrathecal enhancement. Stable alignment of the lumbar vertebrae with 3 mm degenerative grade 1 anterolisthesis of L3 on L4. Slight exaggeration of the normal lumbar lordotic curvature centered at L3. Normal vertebral body heights and marrow signal. No fracture, destructive bone lesion or infection. Small T1 hyperintense intraosseous hemangiomas at L1. Moderate asymmetric left lower lumbar predominant dorsal paraspinous muscular atrophy. Left sacroiliac fusion device with bony fusion across the joint. Findings on a level by level basis are as follows: T12-L1: Mild to moderate disc height loss. Loss of disc signal. Disc bulge. No disc herniation. Mild facet arthrosis. Mild left neural foraminal stenosis. The right neural foramen is widely patent. No significant spinal canal stenosis. L1-L2: Mild disc height loss. Loss of disc signal. Eccentric right disc bulge. No disc herniation. Facet arthrosis.. Mild to moderate right and mild left neural foraminal stenosis. No significant spinal canal stenosis. L2-L3: Mild disc height loss. Loss of disc signal. Disc bulge. No disc herniation. Facet arthrosis. Mild to moderate left and mild right neural foraminal stenosis. No significant spinal canal stenosis. L3-L4: Mild to moderate disc height loss. Loss of disc signal. Degenerative anterolisthesis with unroofed disc bulge. No disc herniation. Facet arthrosis and ligamentum flavum thickening. Mild to moderate left and mild right neural foraminal stenosis. Mild spinal canal stenosis. L4-L5: Minimal disc height loss. Partial loss of disc signal. Disc bulge. No disc herniation. Facet arthrosis. Mild to moderate left and mild right neural foraminal stenosis. No significant spinal canal stenosis. L5-S1: Normal hypoplastic disc and facets. No disc herniation. No significant spinal canal or neural foraminal stenosis.     IMPRESSION: 1.  No significant change since 9/3/2023. Stable multilevel lumbar  spondylosis with degenerative grade 1 anterolisthesis of L3 on L4. 2.  Mild to moderate neural foraminal stenosis on the right at L1-L2 and on the left L2-L5. 3.  Mild spinal canal stenosis at L3-L4. 4.  No evidence of metastatic disease in the lumbar spine. 5.  Normal variant transitional lumbosacral junction anatomy. Recommend close correlation with plain films prior to any intervention.     Abd/pelvis CT no contrast - [facility] Protocol  Result Date: 5/30/2025  EXAM: CT ABDOMEN PELVIS W/O CONTRAST LOCATION: [facility] DATE: 5/30/2025 INDICATION: Left flank and left lower quadrant pain COMPARISON: CT abdomen pelvis 10/20/2024 TECHNIQUE: CT scan of the abdomen and pelvis was performed without IV contrast. Multiplanar reformats were obtained. Dose reduction techniques were used. CONTRAST: None. FINDINGS: LOWER CHEST: Normal. HEPATOBILIARY: Normal unenhanced liver contours. Cholecystectomy. PANCREAS: Postoperative changes from Whipple. No stranding around the remaining pancreas. SPLEEN: Normal. ADRENAL GLANDS: Normal. KIDNEYS/BLADDER: Normal unenhanced renal contours. No urolithiasis or collecting system dilation. Minimally distended bladder. BOWEL: Postoperative changes from Whipple. Scattered colonic diverticulosis without evidence of diverticulitis. Normal appendix. No free fluid or free air. LYMPH NODES: No lymphadenopathy. VASCULATURE: The abdominal aorta is nonaneurysmal with moderate atheromatous disease. PELVIC ORGANS: Uterus is present. No adnexal mass. MUSCULOSKELETAL: Degenerative changes of the spine. Left hip replacement and left SI joint fusion.     IMPRESSION: 1.  No acute abnormality in the abdomen or pelvis on noncontrast CT. 2.  Additional chronic noncritical details in the findings.     Narrative & Impression   EXAM: MR LUMBAR SPINE W/O CONTRAST  LOCATION: [facility]  DATE: 9/3/2023     INDICATION: History of lumbar lami (+10yrs), primarily axial  LBP>BLE radicular pain.  COMPARISON: Plain film imaging 08/29/2023 of the lumbar spine, prior body CT 08/30/2023 also reviewed.  TECHNIQUE: Routine Lumbar Spine MRI without IV contrast.     FINDINGS:   Nomenclature is based on 5 lumbar type vertebral bodies. Rudimentary 12th ribs are identified. Partial sacralization left L5. Correlate with plain film imaging if there is future plan for intervention. Satisfactory lumbar vertebral body height. 2 mm   degenerative anterior subluxation L3-L4 as on prior body CT. No high-grade subluxations. No marrow or ligamentous edema. Nothing for sacral insufficiency or stress fracture. Nothing for a marrow-infiltrative process. Benign vertebral body hemangioma at   L1. Interspace narrowing lower thoracic level and at L3-L4. Congenital narrowing of the L5-S1 interspace. Left SI joint space fusion hardware with degenerative changes right SI joint. Sacral neural foramina are intact. Normal distal spinal cord and cauda   equina with conus medullaris at L1. Rightward lumbar curve. Nerve roots of the cauda equina are satisfactory without nodularity thickening. No retroperitoneal solid mass or adenopathy. Symmetric psoas appearance. Normal caliber abdominal aorta.   Satisfactory renal contours.     T12-L1: Moderate loss of disc height with annular bulge. No disc herniation, spinal stenosis or foraminal narrowing is present. Facet joints are satisfactory.      L1-L2: Normal disc height and signal. Mild annular bulge. No herniation. Normal facets. No spinal canal or neural foraminal stenosis.     L2-L3: Mild loss of disc height with annular bulge. No disc herniation. No spinal stenosis. Mild foraminal narrowing bilaterally. Mild facet joint hypertrophic changes are present.      L3-L4: Mild loss of disc height. Low-grade degenerative anterior subluxation. Generalized disc bulge. No disc herniation. Mild foraminal narrowing inferiorly. No spinal stenosis. Mild facet joint arthropathy  bilaterally.     L4-L5: Normal disc height and signal. Annular bulge. No herniation. Normal facets. No spinal canal or right neural foraminal stenosis. Mild left foraminal narrowing.     L5-S1: Partial sacralization on the left. Interspace narrowing on a congenital/developmental basis with disc desiccation. No disc herniation. No spinal stenosis or foraminal narrowing.                                                                      IMPRESSION:  1.  See above for counting nomenclature, stable from prior body CT examination 08/30/2023 with partial sacralization left L5. Correlate with plain film imaging if there is future plan for intervention.     2.  Low-grade degenerative anterior subluxation L3 upon L4. No high-grade subluxations. No marrow or ligamentous edema.     3.  No evidence for severe spinal stenosis or severe foraminal compromise at any lumbar level.     4.  See above for additional details and full level by level description.       This note was dictated using voice recognition software. Any grammatical or context distortions are unintentional and inherent to the software.       Sara ASKEWP-C  [facility]  O. [phone]             Again, thank you for allowing me to participate in the care of your patient.        Sincerely,        Sara Reina, NINA CNP    Electronically signed

## 2025-06-19 ASSESSMENT — PAIN SCALES - PAIN ENJOYMENT GENERAL ACTIVITY SCALE (PEG)
PEG_TOTALSCORE: 6
INTERFERED_ENJOYMENT_LIFE: 6
INTERFERED_GENERAL_ACTIVITY: 5
AVG_PAIN_PASTWEEK: 7

## 2025-06-24 ENCOUNTER — OFFICE VISIT (OUTPATIENT)
Dept: PALLIATIVE MEDICINE | Facility: OTHER | Age: 60
End: 2025-06-24
Attending: NURSE PRACTITIONER
Payer: MEDICARE

## 2025-06-24 VITALS — DIASTOLIC BLOOD PRESSURE: 60 MMHG | OXYGEN SATURATION: 99 % | SYSTOLIC BLOOD PRESSURE: 120 MMHG | HEART RATE: 100 BPM

## 2025-06-24 DIAGNOSIS — Z79.891 LONG TERM (CURRENT) USE OF OPIATE ANALGESIC: Primary | ICD-10-CM

## 2025-06-24 LAB
CANNABINOIDS UR QL SCN: ABNORMAL
CREAT UR-MCNC: 80 MG/DL
ETHANOL UR QL SCN: NORMAL

## 2025-06-24 PROCEDURE — G0463 HOSPITAL OUTPT CLINIC VISIT: HCPCS | Performed by: NURSE PRACTITIONER

## 2025-06-24 PROCEDURE — 99213 OFFICE O/P EST LOW 20 MIN: CPT | Performed by: NURSE PRACTITIONER

## 2025-06-24 PROCEDURE — 80307 DRUG TEST PRSMV CHEM ANLYZR: CPT | Performed by: NURSE PRACTITIONER

## 2025-06-24 PROCEDURE — 3074F SYST BP LT 130 MM HG: CPT | Performed by: NURSE PRACTITIONER

## 2025-06-24 PROCEDURE — 3078F DIAST BP <80 MM HG: CPT | Performed by: NURSE PRACTITIONER

## 2025-06-24 NOTE — PROGRESS NOTES
June 25, 2024     COMPREHENSIVE PAIN CLINIC FOLLOW UP EVALUATION   Ms. Nathalie Bashir on 11/3/2023 in the [facility] per request of Dr. Dobbs with regards to her pain.  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  UDS and opioid agreement singed and UDS appropriate on 11/03/2023.  Nathalie Bashir has been seen at a pain clinic in the past in [place] 10 years ago, [facility] in 1990s and [facility].  UDS and opioid agreement signed on 10/29/2024.       Updates since last appointment on 3/25/2025.  She presents alone.    UDS and CSA today.    She saw NINA Rodriguez CNP PM&R and had new lumbar MRI.  PT and ILESI was recommended with Dr. Gallegos.    She is on abx for bladder infection.    TRIPS:  [place]and[place]orm[place] over 7/4 is pending.      Interventions/Injections:   3/5/2025 Dr. Gallegos - Lumbar MBB #2 L3-5  1/15/2025 Dr. [place] JONATHON decreased pain by 70%      Progress Notes Reviewed:  6/18/2025 NINA Rodriguez, CNP - reviewed lumbar MRI, refer PT      Any hospitalizations/ER/UC visits since last appointment:  no  Any falls/accidents since last appointment:  no      Primary Pain :  Neuropathy  Low back pain R=L - hurts the worst in the morning  Neck pain      Characteristics:  Any changes in pain characteristics since last appointment?  no    The patient describes the pain as constant, stabbing, burning and numbness.      What makes the pain better:  Her pain is improved with laying down, rest.    What makes the pain worse:   She reports that the pain is made worse by moving her head, using her arms, ADL's.     6/24/2025 current back pain on 0/10 VAS:    4   Worst pain:    9    Best pain:    4  3/25/2025 current pain on 0/10 VAS:    7   Worst pain:     9   Best  pain: 4  12/24/2024 current pain on 0/10 VAS:   7    Worst pain:   9   Best pain:  4  10/29/2024 current pain on 0/10 VAS:   8    Worst pain:   9   Best pain:  6     07/24/2024 current pain on 0/10 VAS:   1-2 Worst pain:  10 prior to JONATHON  06/07/2024 current pain on 0/10 VAS:   7    06/07/2024 current pain on 0/10 VAS:   7          03/15/2024 current pain on 0/10 VAS:   6          Current Pain Related Medications:  Any medications changes since last appointment: no    Amitriptyline 75mg q hs   Celexa 20mg 1.5 tabs daily 30mg daily  Cyclobenzaprine 10 mg TID PRN  Hydroxyzine 25mg 1-2 tabs QID anxiety  Zofran 8mg prn nausea with migraines  Oxycodone 5mg TID - last dose last yesterday  Stool softeners PRN  Imitrex 100mg PRN HA - per PCP  Voltaren gel prn     Anticoagulation:  none  Implanted Devices:  none      Therapies discuss on initial consult:   Physical therapy, Pain Psychology, TENs unit, Grounding Mat, Frequency Specific Micro Current, Anti-inflammatory Lifestyle    Social:    She is  and  lives in a house.  She has 1 grown son and 4 grandchildren.  She is independent in ADL's.       Employment: She is on SSD.     Exercise:  Last PT was 2018.  Patient exercises with stretching at home. Starting PT on Monday.     Hobbies:  She enjoys her grandchildren. One grandson is autistic.     Mental Health:  Patient reports depression is under control on citalopram 30mg.  Patient does not follow with a mental health care provider.              Plan on 3/25/2025:  No refills needed today.    Follow-up in clinic in 3 months.    UDS next appt.      Updates since last appointment on 12/24/2024  She presents alone using a single point cane.  Weather changes increase her pain.    Last appt rotate to oxycodone 5mg TID prn from hydrocodone 5/325 which caused heartburn.  She reports the tylenol in hydrocodone causes GI upset.  She is requesting oxycodone 10mg BID so it would last longer. I explained oxy 5mg and 10mg only  last around 4 -5 hrs.    1/15/2025 Dr. El HOLT - decreased pain by 70%  Lumbar RFA 4/23/2025 scheduled with Dr. Gallegos.    She has frequent bladder infections.    TRIPS:  [place] over 7/4.      Interventions/Injections:   3/5/2025 Dr. Gallegos - Lumbar MBB #2 L3-5  1/15/2025 Dr. Gallegos - JONATHON decreased pain by 70%      Progress Notes Reviewed:  3/5/2025 Dr. Gallegos - Lumbar MBB #2 L3-5  1/14/2025 Dr. Gallegos - Bilateral L3-5 MBB/RFA process ordered and consider lumbar MORGAN in future  1/2/2025 Marianne Gonzalez, [facility]     Any hospitalizations/ER/UC visits since last appointment:  no  Any falls/accidents since last appointment:  no      Primary Pain :  Neuropathy  Low back pain R=L - hurts the worst in the morning  Neck pain      Characteristics:  Any changes in pain characteristics since last appointment?  no    The patient describes the pain as constant, stabbing, burning and numbness.      What makes the pain better:  Her pain is improved with laying down, rest.    What makes the pain worse:   She reports that the pain is made worse by moving her head, using her arms, ADL's.     3/25/2025 current pain on 0/10 VAS:    7   Worst pain:     9   Best pain: 4  12/24/2024 current pain on 0/10 VAS:   7    Worst pain:   9   Best pain:  4  10/29/2024 current pain on 0/10 VAS:   8    Worst pain:   9   Best pain:  6     07/24/2024 current pain on 0/10 VAS:   1-2 Worst pain:  10 prior to JONATHON  06/07/2024 current pain on 0/10 VAS:   7    06/07/2024 current pain on 0/10 VAS:   7          03/15/2024 current pain on 0/10 VAS:   6        Current Pain Related Medications:  Any medications changes since last appointment: no    Amitriptyline 75mg q hs   Celexa 20mg 1.5 tabs daily 30mg daily  Cyclobenzaprine 10 mg TID PRN  Hydroxyzine 25mg 1-2 tabs QID anxiety  Zofran 8mg prn nausea with migraines  Oxycodone 5mg TID - last dose last yesterday  Stool softeners PRN  Imitrex 100mg PRN HA - per PCP     Anticoagulation:   none  Implanted Devices:  none      Therapies discuss on initial consult:   Physical therapy, Pain Psychology, TENs unit, Grounding Mat, Frequency Specific Micro Current, Anti-inflammatory Lifestyle    Social:    She is  and  lives in a house.  She has 1 grown son and 4 grandchildren.  She is independent in ADL's.       Employment: She is on SSD.     Exercise:  Last PT was 2018.  Patient exercises with stretching at home. Starting PT on Monday.     Hobbies:  She enjoys her grandchildren. One grandson is autistic.     Mental Health:  Patient reports depression is under control on citalopram 30mg.  Patient does not follow with a mental health care provider.                 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  Stomach cancer was treated with gastrectomy and chemotherapy.  Doctors found stomach cancer when they did Whipple Surgery 01/2020 at [facility].  Patient has numbness and tingling in legs in the stocking foot distribution and b/l hands.  Patient has lumbar pain and has had lumbar MBB x 1 with Dr. Urbina.  Patient has not had any spine surgery in the past.  The patient describes the pain as constant, stabbing, burning and numbness.  She reports that the pain is made worse by prolong standing, ADL's.  Her pain is improved with laying down, rest.  She rates her currenty pain score at 7/10, but it can be as low as 3/10 or as severe as 9/10. Patient follows with Dr. Urbina for lumbar intervention options.  Rheumatology could not find any cause for her joint pain.        Progress Notes Reviewed:  10/19/2023 Dr. Dobbs, PM&R - order quad cane and compression stockings, follow-up with Dr. Urbina for low back pain  10/17/2023 Dr. Mckoy, Rheumatology  10/01/2023 UC - UTI  09/27/2023   Carlitos - lumbar MBB L4-5 and L5-S1  09/12/2023 Dr. Urbina     Patient reports depression is under control on citalopram 30mg.  Patient does not follow with a mental health care provider.  Last PT was 2018.  Patient exercises with stretching at home.     She denies any new problems with falls or balance, any new numbness or weakness of the arms or legs, any new bowel or bladder incontinence, any night sweats or unexplained fevers, or any sudden or unexpected weight loss.       Nathalie Bashir has been seen at a pain clinic in the past in [place] 10 years ago, [facility] in 1990s and  [facility].        Current Treatments:  Amitriptyline 75mg q hs - she ran out of this medication for sleep.  Not helpful for neuropathic pain.  Celexa 20mg 1.5 tabs daily 30mg daily  Compounding medication  Cyclobenzaprine 10 mg TID PRN  Hydroxyzine 25mg 1-2 tabs QID anxiety  Zofran 8mg prn nausea with migraines  Oxycodone 5mg TID - since 2020 with palliative care  Stool softeners PRN  Imitrex 100mg PRN HA     NOTE:  allergies to naprosyn, methadone, latex, ketorlac, gabapentin, acetaminophen, topiramate, pregabalin and oxycontin     Previous Medication Treatments Included:  Anti-convulsants: Gabapentin, pregabalin had negative SE  Muscle relaxors: Tizanidine not helpful  Anti-depressants: amitriptyline was not helpful for pain, duloxetine had negative SE with mood  Benzodiazapine's: none  Acetaminophen/NSAIDs: negative SE  Topicals: Voltaren gel 1%  Headache abortives: imitrex  Headache prophylactics:   Opioids: MS ER 30mg BID, Oxycodone 10mg BID at previous pain - [facility] in 1990, buprenorphine caused negative SE        Other Treatments Have Included:  Physical therapy: yes over 2 years ago  Pain Psychology: no  Chiropractic: yes - not helpful  Acupuncture: yes - not helpful  TENs Unit: yes she has one  Injections: 09/27/2023 lumbar MBB #1  Surgeries: L) hip replacement 2015, L) SI fusion 2008  Dry Needling: no  Massage:no     Past  Medical History:  Medical history reviewed.  Past Medical History        Past Medical History:   Diagnosis Date    Allergic rhinitis      Cancer (H)       stomach cancer    Chronic pain      Closed fracture of distal end of left radius      Depression      Gastric adenocarcinoma (H)      Lumbago      Migraine      Opioid dependence (H)      Other chronic pain       low back    Sacroiliitis (H)       low back pain    Trochanteric bursitis       leg length discrrepancy, hip pain             Patient Active Problem List   Diagnosis    Lower limb length difference    IPMN (intraductal papillary mucinous neoplasm)    Allergic rhinitis    Arthralgia of hip    Bursitis    Chronic neck pain    Chronic pain disorder    Continuous opioid dependence (H)    Controlled substance agreement signed    Debility    Degeneration of intervertebral disc of lumbosacral region    Depression    Disorder of sacrum    Greater trochanteric bursitis of left hip    Infected prosthesis of left hip (H)    Chronic bilateral low back pain without sciatica    MDD (major depressive disorder), recurrent, in partial remission (H)    Migraine with aura    Sacroiliitis (H)    Screening for malignant neoplasm of cervix    Spondylosis of lumbosacral spine without myelopathy    Status post left hip replacement    Cervical radiculopathy, chronic    Unilateral inguinal hernia without obstruction or gangrene    Long term (current) use of opiate analgesic    Low urine output    Hypophosphatemia    Leukocytosis    Intra-abdominal fluid collection    Bacteremia due to Gram-negative bacteria    Malnutrition (H)    Gastric adenocarcinoma (H)    Pneumonia    Mucositis    Chemotherapy-induced neutropenia (H)    Gastric cancer (H)    Cholangitis (H)    Thrombocytopenia (H)    Urinary tract infection    Crohn's disease of small intestine (H)    Jejunitis    Colitis    Abdominal pain    Malignant neoplasm of overlapping sites of stomach (H)    Iron deficiency anemia     Anemia    Paralytic ileus (H)    Hypokalemia    Nausea & vomiting    Dysphagia    History of gastric surgery    Chemotherapy-induced neuropathy (H)    Bilateral hand pain    Pyelonephritis    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)    Other closed intra-articular fracture of distal end of left radius, initial encounter    Lumbar facet arthropathy    Lumbar spondylosis    H/O Whipple procedure         Past Surgical History:  Pertinent surgical history reviewed.  Past Surgical History         Past Surgical History:   Procedure Laterality Date    ARTHROPLASTY HIP Left 2016    BACK SURGERY         L4-5 decompression    CATARACT IOL, RT/LT        CHOLECYSTECTOMY N/A 1/28/2020     Procedure: Cholecystectomy;  Surgeon: Yandel Mallory MD;  [facility]    ENDOSCOPIC RETROGRADE CHOLANGIOPANCREATOGRAM N/A 7/27/2020     Procedure: ENDOSCOPIC RETROGRADE CHOLANGIOPANCREATOGRAPHY  **Latex Allergy** with jejunal biopsies;  Surgeon: Jeet Aviles MD;  Location:  OR    ESOPHAGOSCOPY, GASTROSCOPY, DUODENOSCOPY (EGD), COMBINED N/A 3/3/2020     Procedure: ESOPHAGOGASTRODUODENOSCOPY, WITH ENDOSCOPIC US (enoxaparin);  Surgeon: Bakari Plata MD;  Location:  GI    ESOPHAGOSCOPY, GASTROSCOPY, DUODENOSCOPY (EGD), COMBINED N/A 12/7/2020     Procedure: Upper Endoscopy with stent placement;  Surgeon: Jeet Aviles MD;  Location:  OR    ESOPHAGOSCOPY, GASTROSCOPY, DUODENOSCOPY (EGD), COMBINED N/A 2/8/2021     Procedure: ESOPHAGOGASTRODUODENOSCOPY (EGD)AND STENT REMOVAL;  Surgeon: Jeet Aviles MD;  Location:  OR    EYE SURGERY        GASTRECTOMY N/A 10/21/2020     Procedure: Open subtotal gastectomy with David en Y Reconstruction; D1 Lymph Node Disection  **Latex Allergy**;  Surgeon: Yandel Mallory MD;  [facility]    INJECT BLOCK MEDIAL BRANCH CERVICAL/THORACIC/LUMBAR Bilateral 9/27/2023     Procedure: BILATERAL L4-5, L5-S1 medial branch block #1;  Surgeon: Carlitos  MD Maria Victoria;  Location: [facility] OR    IR CHEST PORT PLACEMENT > 5 YRS OF AGE   3/26/2020    IR LUMBAR EPIDURAL STEROID INJECTION   8/25/2009    IR LUMBAR EPIDURAL STEROID INJECTION   9/2/2009    IR LUMBAR EPIDURAL STEROID INJECTION   9/25/2009    IR LUMBAR EPIDURAL STEROID INJECTION   11/23/2009    IR LUMBAR EPIDURAL STEROID INJECTION   4/1/2010    IR LUMBAR EPIDURAL STEROID INJECTION   9/1/2010    IR LUMBAR EPIDURAL STEROID INJECTION   3/10/2011    IR LUMBAR EPIDURAL STEROID INJECTION   8/30/2011    IR PICC PLACEMENT > 5 YRS OF AGE   10/12/2016    IR PORT REMOVAL RIGHT   12/8/2023    LAPAROSCOPY DIAGNOSTIC (GENERAL) N/A 10/13/2020     Procedure: Diagnostic laparoscopy with peritoneal washings  **Latex Allergy**;  Surgeon: Yandel Mallory MD;  [facility]    OPEN REDUCTION INTERNAL FIXATION RODDING INTRAMEDULLARY FEMUR Left 12/23/2014     Procedure: OPEN REDUCTION INTERNAL FIXATION RODDING INTRAMEDULLARY FEMUR;  Surgeon: Arnol Santiago MD;  [facility]: [facility] OR    OPEN REDUCTION INTERNAL FIXATION WRIST Left 9/22/2022     Procedure: LEFT WRIST OPEN REDUCTION INTERNAL FIXATION, FRACTURE;  Surgeon: Mark Villalobos MD;  Location: [facility] OR    ORTHOPEDIC SURGERY        PICC DOUBLE LUMEN PLACEMENT Right 02/07/2020     5 fr double lumen 41 cm    IN LAMINEC/FACETECT/FORAMIN,LUMBAR 1 SEG         Description: Laminectomy Decompress, Facetectomy, Foraminotomy Lumbar Seg;  Recorded: 04/12/2012;    REMOVE HARDWARE LOWER EXTREMITY Left 4/7/2015     Procedure: REMOVE HARDWARE LOWER EXTREMITY;  Surgeon: Arnol Santiago MD;  [facility]    REMOVE HARDWARE RODDING INTRAMEDULLARY FEMUR Left 12/17/2015     Procedure: REMOVE HARDWARE RODDING INTRAMEDULLARY FEMUR;  Surgeon: Arnol Santiago MD;  [facility]    RESECT BONE LOWER EXTREMITY Left 12/23/2014     Procedure: RESECT BONE LOWER EXTREMITY;  Surgeon: Arnol Santiago MD;  [facility]    SHORTENING OSTEOPLASTY FEMUR W/ IM NAILING        WHIPPLE PROCEDURE N/A 1/28/2020      Procedure: Open Whipple procedure and Cholecystectomy;  Surgeon: Yandel Mallory MD;  [facility] OR    WHIPPLE PROCEDURE        Z FUSION OF SACROILIAC JOINT         Description: Arthrodesis Sacroiliac Joint Left;  Recorded: 10/07/2011;    ZZC REPAIR DETACH RETINA,SCLERAL BUCKLE                Medications: Pertinent medications reviewed.  Current Outpatient Prescriptions          Current Outpatient Medications   Medication Sig Dispense Refill    amitriptyline (ELAVIL) 75 MG tablet Take 1 tablet (75 mg) by mouth at bedtime 30 tablet 3    ciprofloxacin (CIPRO) 500 MG tablet Take 1 tablet (500 mg) by mouth 2 times daily. (Patient not taking: Reported on 12/17/2024) 10 tablet 0    citalopram (CELEXA) 20 MG tablet Take 1.5 tablets (30 mg) by mouth daily 135 tablet 1    COMPOUNDED NON-CONTROLLED SUBSTANCE (CMPD RX) - PHARMACY TO MIX COMPOUNDED MEDICATION Estriol 1 mg/gram,place 1 gram vaginally three times per week,42.5 grams (Patient not taking: Reported on 12/17/2024) 1 each 3    Cyanocobalamin 1000 MCG CAPS Take 1 tablet by mouth every morning        cyclobenzaprine (FLEXERIL) 10 MG tablet TAKE 1 TABLET BY MOUTH 3 TIMES DAILY AS NEEDED FOR MUSCLE SPASMS 90 tablet 3    famotidine (PEPCID) 20 MG tablet TAKE 1 TABLET (20 MG) BY MOUTH 2 TIMES DAILY AS NEEDED 180 tablet 1    fluticasone (FLONASE) 50 MCG/ACT nasal spray Spray 1 spray into both nostrils daily at 2 pm        loperamide (IMODIUM) 2 MG capsule Take 4 mg by mouth 4 times daily as needed for diarrhea (Patient not taking: Reported on 12/17/2024)        loratadine-pseudoePHEDrine (CLARITIN-D 24-HOUR)  MG 24 hr tablet Take 1 tablet by mouth daily as needed for allergies Usually takes in Spring/Fall        molnupiravir (LAGEVRIO) 200 MG capsule Take 4 capsules (800 mg) by mouth every 12 hours 40 each 0    ondansetron (ZOFRAN) 8 MG tablet Take 1 tablet (8 mg) by mouth every 8 hours as needed (Nausea/Vomiting) 90 tablet 2    oxyCODONE (ROXICODONE) 5 MG tablet  Take 1 tablet (5 mg) by mouth 3 times daily as needed for breakthrough pain or moderate to severe pain. Maximum 3 pills/day. 90 tablet 0    pantoprazole (PROTONIX) 40 MG EC tablet TAKE 1 TABLET BY MOUTH 2 TIMES DAILY 180 tablet 1    sennosides (SENOKOT) 8.6 MG tablet 1-2 tablets 1-2 times daily as needed. 120 tablet 1    simethicone 80 MG TABS Take 1 tablet by mouth every 6 hours as needed (bloating, gas, belching) 90 tablet 3    SUMAtriptan (IMITREX) 100 MG tablet Take 100 mg by mouth at onset of headache for migraine                [place] Prescription Monitoring Program reviewed 6/25/2025.  No concern for abuse or misuse of controlled medications based on this report.  6/16/2025 Oxycodone 5mg 90 tabs for 30 days  5/17/2025 Oxycodone 5mg 90 tabs for 30 days  4/15/2025 Oxycodone 5mg 90 tabs for 30 days  3/18/2025 Oxycodone 5mg 90 tabs for 30days  2/18/2025 Oxycodone 5mg 90 tabs for 30days  1/21/2024 Oxycodone 5mg 90 tabs for 30days\  12/24/2025 Oxycodone 5mg 90 tabs for 30days  11/26/2024 Hydrocodone/act 5/325mg 90 tabs for 30 days  10/29/2024 Hydrocodone/act 5/325mg 90 tabs for 30 days  10/16/2024 Oxycodone 5mg 90 tabs for 30 days  09/18/2024 Oxycodone 5mg 90 tabs for 30 days  08/21/2024 Oxycodone 5mg 90 tabs for 30 days  07/24/2024 Oxycodone 5mg 90 tabs for 30 days  06/24/2024 Hydrocodone-acet 5/325mg 75 tabs for 25 ays  06/06/2024 Oxycodone 5mg 90 tabs for 30 days  05/07/2024 Oxycodone 5mg 90 tabs for 30 days  04/07/2024 Oxycodone 5mg 90 tabs for 30 days  Monthly scripts for oxycodone in 2024 no other medications listed.        THE 4 A's OF OPIOID MAINTENANCE ANALGESIA     Analgesia: Patient reports decrease pain on current pain medications and doses at a tolerable level.     Activity: She is independent in ADL's and report improved function with pain regimen     Adverse effects: She denies any significant negative side effects from her pain medications.     Adherence to Rx protocol: Patient is taking her medications  as prescribed with no signs of misuse or abuse.        Allergies: Pertinent allergies reviewed.     Allergies         Allergies   Allergen Reactions    Gluten Meal Palpitations    Amoxicillin-Pot Clavulanate Rash       Patient tolerated Zosyn in 7/2020    Oxycontin [Oxycodone] Other (See Comments)       Confusion, loopy, oxycodone is tolerated.    Pt states she tolerates regular oxycodone, cannot take 12 hour oxycontin.      Pregabalin         interfered with Cymbalta    Topiramate         Tongue numbness, taste alteration, irritable     Acetaminophen Nausea       Urine retention       Dust Mite Extract      Gabapentin Dizziness and GI Disturbance    Ketorolac Headache    Latex Rash    Methadone Fatigue    Mold      Naprosyn [Naproxen] Dizziness and GI Disturbance    Seasonal Allergies              Family History:   family history includes Dementia in her father; Heart Failure (age of onset: 77) in her mother; No Known Problems in her brother and sister.     Social History:   She is  and  lives in a house.  She has 1 grown child.  She is on SSD.  She is independent in ADL's.  She has 4 grand children who live in [place].  She  reports that she quit smoking about 4 years ago. Her smoking use included cigarettes. She started smoking about 30 years ago. She has a 12.5 pack-year smoking history. She has been exposed to tobacco smoke. She has never used smokeless tobacco. She reports that she does not drink alcohol and does not use drugs.    Social History          Social History Narrative    Not on file         Physical Exam:  /60   Pulse 100   LMP 09/23/2014   SpO2 99%      Constitutional: She is oriented to person, place, and time.  She appears well-developed and well-nourished. She is not in acute distress.   HENT:     Head: Normocephalic and atraumatic.     Eyes: Pupils are equal, round, and reactive to light. EOM are normal. No scleral icterus.   Pulmonary/Chest:  NWOB. No respiratory distress.    Neurological: She is alert and oriented to person, place, and time. Coordination grossly normal.    Skin: Skin is warm and dry. She is not diaphoretic.   Psychiatric: She has a normal mood and affect. Her behavior is normal. Judgment and thought content normal.  Patient answers questions appropriately.  MSK: Gait is antalgic.          Imaging:  EXAM: MR LUMBAR SPINE W/O and W CONTRAST  LOCATION: [facility]  DATE: 6/15/2025     INDICATION: hx gastric cancer, hx lumbar surgery, bilateral lumbar radiculopathy and low back pain  COMPARISON: Lumbar spine MRI 9/3/2023.  CONTRAST: 7mlGadavist  TECHNIQUE: Routine Lumbar Spine MRI without and with IV contrast.     FINDINGS:   Presumed hypoplastic/aplastic ribs at T12, there are 5 lumbar type vertebrae including a transitional lumbosacral junction segment designated as a sacralized L5 with a hypoplastic L5-S1 disc. The tip the conus medullaris is at L1-L2. The cauda equina   roots and visualized lower thoracic spinal cord are within normal limits. No abnormal foci of intrathecal enhancement.     Stable alignment of the lumbar vertebrae with 3 mm degenerative grade 1 anterolisthesis of L3 on L4. Slight exaggeration of the normal lumbar lordotic curvature centered at L3. Normal vertebral body heights and marrow signal. No fracture, destructive   bone lesion or infection. Small T1 hyperintense intraosseous hemangiomas at L1. Moderate asymmetric left lower lumbar predominant dorsal paraspinous muscular atrophy. Left sacroiliac fusion device with bony fusion across the joint.     Findings on a level by level basis are as follows:  T12-L1: Mild to moderate disc height loss. Loss of disc signal. Disc bulge. No disc herniation. Mild facet arthrosis. Mild left neural foraminal stenosis. The right neural foramen is widely patent. No significant spinal canal stenosis.      L1-L2: Mild disc height loss. Loss of disc signal. Eccentric right disc bulge. No disc  herniation. Facet arthrosis.. Mild to moderate right and mild left neural foraminal stenosis. No significant spinal canal stenosis.     L2-L3: Mild disc height loss. Loss of disc signal. Disc bulge. No disc herniation. Facet arthrosis. Mild to moderate left and mild right neural foraminal stenosis. No significant spinal canal stenosis.     L3-L4: Mild to moderate disc height loss. Loss of disc signal. Degenerative anterolisthesis with unroofed disc bulge. No disc herniation. Facet arthrosis and ligamentum flavum thickening. Mild to moderate left and mild right neural foraminal stenosis.   Mild spinal canal stenosis.     L4-L5: Minimal disc height loss. Partial loss of disc signal. Disc bulge. No disc herniation. Facet arthrosis. Mild to moderate left and mild right neural foraminal stenosis. No significant spinal canal stenosis.     L5-S1: Normal hypoplastic disc and facets. No disc herniation. No significant spinal canal or neural foraminal stenosis.                                                                      IMPRESSION:  1.  No significant change since 9/3/2023. Stable multilevel lumbar spondylosis with degenerative grade 1 anterolisthesis of L3 on L4.  2.  Mild to moderate neural foraminal stenosis on the right at L1-L2 and on the left L2-L5.  3.  Mild spinal canal stenosis at L3-L4.  4.  No evidence of metastatic disease in the lumbar spine.   5.  Normal variant transitional lumbosacral junction anatomy. Recommend close correlation with plain films prior to any intervention.         Narrative & Impression   EXAMINATION: CT CHEST/ABDOMEN/PELVIS W CONTRAST, 11/13/2023 12:03 PM     TECHNIQUE:  Helical CT images from the thoracic inlet through the  symphysis pubis were obtained with IV contrast. Contrast dose: Isovue  370 88cc     COMPARISON: CT abdomen and pelvis dated 8/30/2023, CT chest abdomen  pelvis dated 5/17/2023.     HISTORY: Malignant neoplasm of pyloric antrum (H)     FINDINGS:  CHEST:  LUNGS:  Central tracheobronchial tree is patent. No pneumothorax or  pleural effusion. No focal airspace opacity. No suspicious pulmonary  nodule. Unchanged subcentimeter nodule along the left fissure likely  represents an intrafissural lymph node. Stable few sub-4 mm pulmonary  nodules are likely benign.      MEDIASTINUM: Right port catheter terminates in distal SVC. Heart size  is within normal limits. No pericardial effusion. Ascending aorta and  main pulmonary artery diameters are within normal limits. Normal  appearance and configuration of the great vessels off of the aortic  arch. No suspicious mediastinal, hilar, or axillary lymph nodes.      Visualized thyroid and esophagus are unremarkable.     ABDOMEN/PELVIS:  LIVER: No suspicious focal hepatic lesion.     BILIARY: Gallbladder is surgically absent. Unchanged mild pneumobilia.  No intrahepatic or extrahepatic biliary ductal dilation.     PANCREAS: Post Whipple's procedure. No focal pancreatic lesion. No  pancreatic duct dilatation.     SPLEEN: Within normal limits.     ADRENAL GLANDS: No focal adrenal nodule.     URINARY TRACT: No suspicious renal lesion. No hydronephrosis or  hydroureter. No renal stone. Urinary bladder is unremarkable.     REPRODUCTIVE ORGANS: Within normal limits.     BOWEL: Partial gastrectomy and gastrojejunostomy. Extensive colonic  diverticulosis. Normal caliber large and small bowel. No abnormal  bowel wall thickening or enhancement. Appendix is unremarkable.     PERITONEUM/FLUID: No ascites or pelvic free fluid.     VESSELS: No aneurysmal dilatation of the abdominal aorta.  The portal,  splenic, and superior mesenteric veins are patent.  The origins of the  celiac and superior mesenteric arteries are patent.     LYMPH NODES: No lymphadenopathy.     BONES/SOFT TISSUES: No aggressive osseous lesions. Degenerative  changes in the spine. L1 hemangioma. Few chronic right rib fractures.  Stable small circumscribed lucent focus in the sternum is  likely  benign. Left hip arthroplasty. Left SI joint fixation hardware.  Changes of right inguinal hernia repair.                                                                       IMPRESSION:   No evidence of metastatic disease in the chest, abdomen, or pelvis.     CHERYL SOLIZ MD             EXAM: MR SHOULDER RIGHT WITHOUT CONTRAST  LOCATION: [facility]  DATE: 11/18/2023     INDICATION: Shoulder pain.  COMPARISON: None.  TECHNIQUE: Unenhanced.     FINDINGS:     ROTATOR CUFF:  -Supraspinatus: Mild tendinosis without tearing. Normal muscle without strain or atrophy.  -Infraspinatus: No tendon tear, tendinopathy or fatty atrophy.  -Subscapularis: Mild tendinosis without tear or tendon thinning. Normal muscle without edema or atrophy.  -Teres minor: Normal tendon and muscle.     CORACOACROMIAL ARCH:  -Morphology: Developmental variant os acromiale. Small amount of fluid at the pseudoarticulation. No significant inflammatory changes or stress reaction. Type I acromial morphology. No subacromial spur. Subacromial and subcoracoid space are normal.   -Bursa: Mild subacromial/subdeltoid bursitis.     ACROMIOCLAVICULAR JOINT:   -Normal alignment. Minimal degenerative arthrosis.      LONG HEAD OF BICEPS TENDON:   -Small tenosynovial effusion. Normal tendon without tear or tendinosis.     GLENOHUMERAL JOINT:   -Labrum: Degenerative surface fraying and blunting of the labrum. No full-thickness tear/avulsion.   -Cartilage: Smooth high-grade cartilage loss, with full-thickness thinning over the glenoid, and mixed high-grade partial-thickness and full-thickness thinning over the humeral head.  -Joint space: Minimally increased joint fluid. Mild synovitis. No loose intra-articular bodies.  -Glenohumeral ligaments and capsule: No pericapsular inflammation.     BONES:   -Negative for fracture, AVN, or bone marrow replacement lesion.  -End-stage degenerative arthritic changes, with marginal  osteophytes.     SOFT TISSUES:   -Normal deltoid muscle bulk. Normal visualized chest wall and axilla.                                                                      IMPRESSION:  1.  Advanced osteoarthrosis, with high-grade cartilage loss, including areas of exposed subchondral bone over the glenoid and humeral head. Large marginal osteophytes.     2.  Minimally increased glenohumeral joint fluid. Mild synovitis. No loose bodies.     3.  Intact rotator cuff without full-thickness or partial-thickness tear. Mild supraspinatus and subscapularis tendinosis.     4.  Mild subacromial/subdeltoid bursitis.     5.  Small biceps tenosynovial effusion. No tendon tearing or significant tendinosis.     6.  Incidentally identified os acromiale developmental variant morphology.        Narrative & Impression   EXAM: MR LUMBAR SPINE W/O CONTRAST  LOCATION: [facility]  DATE: 9/3/2023     INDICATION: History of lumbar lami (+10yrs), primarily axial LBP>BLE radicular pain.  COMPARISON: Plain film imaging 08/29/2023 of the lumbar spine, prior body CT 08/30/2023 also reviewed.  TECHNIQUE: Routine Lumbar Spine MRI without IV contrast.     FINDINGS:   Nomenclature is based on 5 lumbar type vertebral bodies. Rudimentary 12th ribs are identified. Partial sacralization left L5. Correlate with plain film imaging if there is future plan for intervention. Satisfactory lumbar vertebral body height. 2 mm   degenerative anterior subluxation L3-L4 as on prior body CT. No high-grade subluxations. No marrow or ligamentous edema. Nothing for sacral insufficiency or stress fracture. Nothing for a marrow-infiltrative process. Benign vertebral body hemangioma at   L1. Interspace narrowing lower thoracic level and at L3-L4. Congenital narrowing of the L5-S1 interspace. Left SI joint space fusion hardware with degenerative changes right SI joint. Sacral neural foramina are intact. Normal distal spinal cord and cauda   equina  with conus medullaris at L1. Rightward lumbar curve. Nerve roots of the cauda equina are satisfactory without nodularity thickening. No retroperitoneal solid mass or adenopathy. Symmetric psoas appearance. Normal caliber abdominal aorta.   Satisfactory renal contours.     T12-L1: Moderate loss of disc height with annular bulge. No disc herniation, spinal stenosis or foraminal narrowing is present. Facet joints are satisfactory.      L1-L2: Normal disc height and signal. Mild annular bulge. No herniation. Normal facets. No spinal canal or neural foraminal stenosis.     L2-L3: Mild loss of disc height with annular bulge. No disc herniation. No spinal stenosis. Mild foraminal narrowing bilaterally. Mild facet joint hypertrophic changes are present.      L3-L4: Mild loss of disc height. Low-grade degenerative anterior subluxation. Generalized disc bulge. No disc herniation. Mild foraminal narrowing inferiorly. No spinal stenosis. Mild facet joint arthropathy bilaterally.     L4-L5: Normal disc height and signal. Annular bulge. No herniation. Normal facets. No spinal canal or right neural foraminal stenosis. Mild left foraminal narrowing.     L5-S1: Partial sacralization on the left. Interspace narrowing on a congenital/developmental basis with disc desiccation. No disc herniation. No spinal stenosis or foraminal narrowing.                                                                      IMPRESSION:  1.  See above for counting nomenclature, stable from prior body CT examination 08/30/2023 with partial sacralization left L5. Correlate with plain film imaging if there is future plan for intervention.     2.  Low-grade degenerative anterior subluxation L3 upon L4. No high-grade subluxations. No marrow or ligamentous edema.     3.  No evidence for severe spinal stenosis or severe foraminal compromise at any lumbar level.     4.  See above for additional details and full level by level description.            EXAM: XR  SACRUM/COCCYX   [facility]   DATE/TIME: 6/14/2021 10:39 AM     INDICATION: Tailbone pain for a year, history of adenocarcinoma of the stomach   COMPARISON: None.     IMPRESSION: Postsurgical change with cage at the left SI joint. Left hip arthroplasty. No fracture or malalignment. No osseous lesion. L5-S1 degenerative disc changes with facet arthrosis. Right hip is intact.      EXAM: XR SHOULDER RIGHT G/E 3 VIEWS  LOCATION: [facility]  DATE/TIME: 9/18/2022 12:10 PM     INDICATION: Pain following fall.  COMPARISON: None.                                                                      IMPRESSION: No fractures are identified. Normal glenohumeral alignment. Mild degenerative changes in the glenohumeral joint. The acromioclavicular joint is unremarkable. Right-sided port catheter.        Narrative & Impression   CT CERVICAL SPINE W/O CONTRAST 9/18/2022 12:14 PM     Provided History: neck pain post fall     Comparison: CT chest and pelvis 11/15/2021     Technique: Using multidetector thin collimation helical acquisition  technique, axial, coronal and sagittal CT images through the cervical  spine were obtained without intravenous contrast.      Findings:     The cervical vertebrae are normally aligned. Straightening of the  cervical curvature.      No acute fracture or traumatic subluxation. No prevertebral edema.      There is multilevel disc height narrowing throughout. Multilevel  cervical spondylosis with disc osteophyte complexes, uncinate  spurring, and facet arthropathy. Mild spinal canal narrowing at C4-5  and C5-6. Mild to moderate bilateral neural foraminal narrowing at  C4-5, mild to moderate right and mild left neural stenosis at C5-6.     No abnormality of the paraspinous soft tissues.                                                                      Impression:   1. No acute fracture or traumatic subluxation.  2. Multilevel cervical  spondylosis, with minimal spinal canal  narrowing at C4-C5 and C5-C6.     I have personally reviewed the examination and initial interpretation  and I agree with the findings.         EMG:  na        Diagnosis:  (M25.511,  G89.29) Chronic right shoulder pain  (primary encounter diagnosis)  Comment:   Plan:      (M54.12) Cervical radiculopathy  Comment:   Plan:      (G89.29) Chronic intractable pain  Comment:   Plan:      (F11.90) Chronic, continuous use of opioids  Comment:   Plan:           Plan on 6/24/2025:  UDS and CSA today.    She can schedule LESI with Dr. Gallegos 7 days after abx is completed and not symptomatic/UTI.    Follow-up 2-3 wks after LESI in clinic.    No medication refills needed today.         NINA Carey, CNP  [place]           BILLING TIME DOCUMENTATION:   The total TIME spent on this patient on the date of the encounter/appointment was 26  minutes.

## 2025-06-24 NOTE — LETTER

## 2025-06-24 NOTE — PATIENT INSTRUCTIONS
Plan on 6/24/2025:  She can schedule LESI with Dr. Gallegos 7 days after abx is completed.    Follow-up 2-3 wks after LESI in clinic.    No medication refills needed today.         NINA Carey, CNP  [place] Number:  [phone]  Call with any questions about your care and for scheduling assistance.   Calls are returned Monday through Friday between 8 AM and 4:30 PM. We usually get back to you within 2 business days depending on the issue/request.    If we are prescribing your medications:  For opioid medication refills, call the clinic or send a [employer] message 7 days in advance.  Please include:  Name of requested medication  Name of the pharmacy.  For non-opioid medications, call your pharmacy directly to request a refill. Please allow 3-4 days to be processed.   Per [place] State Law:  All controlled substance prescriptions must be filled within 30 days of being written.    For those controlled substances allowing refills, pickup must occur within 30 days of last fill.      We believe regular attendance is key to your success in our program!    Any time you are unable to keep your appointment we ask that you call us at least 24 hours in advance to cancel.This will allow us to offer the appointment time to another patient.   Multiple missed appointments may lead to dismissal from the clinic.

## 2025-06-24 NOTE — PROGRESS NOTES
Patient presents to the clinic today for a visit  with NINA Kirkpatrick CNP            UDS/CSA-6/24/25    Medications-oxycodone     QUESTIONS:    Jada Quiñones MA  [facility] [place]

## 2025-07-12 ENCOUNTER — HEALTH MAINTENANCE LETTER (OUTPATIENT)
Age: 60
End: 2025-07-12

## 2025-07-15 DIAGNOSIS — M62.838 MUSCLE SPASM: ICD-10-CM

## 2025-07-15 RX ORDER — CYCLOBENZAPRINE HCL 10 MG
TABLET ORAL
Qty: 90 TABLET | Refills: 3 | Status: SHIPPED | OUTPATIENT
Start: 2025-07-15

## 2025-07-15 NOTE — TELEPHONE ENCOUNTER
Received fax from pharmacy requesting refill(s) for     cyclobenzaprine (FLEXERIL) 10 MG tablet    Date last filled:  6/19/2025    Last Appt Date:  6/24/2025    Next Appt scheduled:  8/15/2025    Pharmacy:      [id number] [place] [id number] [place] cort[place] for processing    [facility]

## 2025-07-22 ENCOUNTER — TELEPHONE (OUTPATIENT)
Dept: UROLOGY | Facility: CLINIC | Age: 60
End: 2025-07-22
Payer: MEDICARE

## 2025-07-22 ENCOUNTER — OFFICE VISIT (OUTPATIENT)
Dept: URGENT CARE | Facility: URGENT CARE | Age: 60
End: 2025-07-22
Payer: MEDICARE

## 2025-07-22 VITALS
WEIGHT: 156 LBS | DIASTOLIC BLOOD PRESSURE: 71 MMHG | BODY MASS INDEX: 24.07 KG/M2 | RESPIRATION RATE: 14 BRPM | TEMPERATURE: 98 F | OXYGEN SATURATION: 97 % | HEART RATE: 94 BPM | SYSTOLIC BLOOD PRESSURE: 108 MMHG

## 2025-07-22 DIAGNOSIS — R30.0 DYSURIA: ICD-10-CM

## 2025-07-22 DIAGNOSIS — N39.0 ACUTE UTI: Primary | ICD-10-CM

## 2025-07-22 LAB
ALBUMIN UR-MCNC: NEGATIVE MG/DL
APPEARANCE UR: CLEAR
BACTERIA #/AREA URNS HPF: ABNORMAL /HPF
BILIRUB UR QL STRIP: NEGATIVE
COLOR UR AUTO: YELLOW
GLUCOSE UR STRIP-MCNC: NEGATIVE MG/DL
HGB UR QL STRIP: ABNORMAL
KETONES UR STRIP-MCNC: ABNORMAL MG/DL
LEUKOCYTE ESTERASE UR QL STRIP: ABNORMAL
MUCOUS THREADS #/AREA URNS LPF: PRESENT /LPF
NITRATE UR QL: POSITIVE
PH UR STRIP: 6 [PH] (ref 5–8)
RBC #/AREA URNS AUTO: ABNORMAL /HPF
SP GR UR STRIP: 1.02 (ref 1–1.03)
SQUAMOUS #/AREA URNS AUTO: ABNORMAL /LPF
UROBILINOGEN UR STRIP-ACNC: 1 E.U./DL
WBC #/AREA URNS AUTO: ABNORMAL /HPF

## 2025-07-22 PROCEDURE — 87086 URINE CULTURE/COLONY COUNT: CPT

## 2025-07-22 PROCEDURE — 81001 URINALYSIS AUTO W/SCOPE: CPT

## 2025-07-22 PROCEDURE — 3074F SYST BP LT 130 MM HG: CPT

## 2025-07-22 PROCEDURE — 3078F DIAST BP <80 MM HG: CPT

## 2025-07-22 PROCEDURE — 87186 SC STD MICRODIL/AGAR DIL: CPT

## 2025-07-22 PROCEDURE — 99214 OFFICE O/P EST MOD 30 MIN: CPT

## 2025-07-22 RX ORDER — CEPHALEXIN 500 MG/1
500 CAPSULE ORAL 2 TIMES DAILY
Qty: 14 CAPSULE | Refills: 0 | Status: SHIPPED | OUTPATIENT
Start: 2025-07-22 | End: 2025-07-29

## 2025-07-22 RX ORDER — NITROFURANTOIN 25; 75 MG/1; MG/1
100 CAPSULE ORAL 2 TIMES DAILY
Qty: 10 CAPSULE | Refills: 0 | Status: SHIPPED | OUTPATIENT
Start: 2025-07-22 | End: 2025-07-22

## 2025-07-22 NOTE — TELEPHONE ENCOUNTER
[facility] Call Center    Phone Message    May a detailed message be left on voicemail: no     Reason for Call: Other: Patient is calling back regarding message below. Please call patient to discuss.     Action Taken: Other: [facility]    Travel Screening: Not Applicable     Date of Service:

## 2025-07-22 NOTE — TELEPHONE ENCOUNTER
[facility] Call Center    Phone Message    May a detailed message be left on voicemail: yes     Reason for Call: Other: pt calling and sure she has a bladder infection, leaving town Thursday around 11, gone till Monday, please reach out to her     Action Taken: Other: urology    Travel Screening: Not Applicable     Date of Service:

## 2025-07-22 NOTE — PROGRESS NOTES
"Patient presents with:  Urinary Problem: Sx started last week friday. Wobbly. Pain when urination.      Clinical Decision Making:      ICD-10-CM    1. Acute UTI  N39.0 cephALEXin (KEFLEX) 500 MG capsule     DISCONTINUED: nitroFURantoin macrocrystal-monohydrate (MACROBID) 100 MG capsule      2. Dysuria  R30.0 UA Macroscopic with reflex to Microscopic and Culture - Clinic Collect     Urine Microscopic Exam     Urine Culture        Urine sample indicating UTI, Keflex prescribed. No true CVA tenderness or fevers. Urine culture pending. Patient otherwise well appearing and vitally stable.     Patient Instructions   1. Increase fluid intake  2. Complete antibiotic regimen as prescribed. You will be notified if the treatment plan needs to be changed based on the urine culture results.   3. Follow if you have a fever of 100.4 F (38 C) or higher, no improvement after three days of treatment, trouble urinating because of pain,new or increased discharge from the vagina, rash or joint pain, Increased back or abdominal pain.      HPI:  Nathalie Bashir is a 59 year old female who presents today with concerns of pain with urination, urinary frequency and left sided back pain. Symptoms started about 4-5 days ago, does have a hx of recurrent UTIs. No fevers. Has also felt slightly more \"wobbly in the last few days which sometimes happens when she gets UTIs. No visual changes or headaches. No falls.     History obtained from the patient.    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)  2022-08: Pyelonephritis  2022-04: Chemotherapy-induced neuropathy  2022-04: Bilateral hand pain  2021-01: History of gastric surgery  2020-11: Dysphagia  2020-11: Nausea & vomiting  2020-10: " Paralytic ileus (H)  2020-10: Hypokalemia  2020-10: Anemia  2020-10: Iron deficiency anemia  2020-09: Malignant neoplasm of overlapping sites of stomach (H)  2020-08: Abdominal pain  2020-08: Colitis  2020-08: Jejunitis  2020-07: Thrombocytopenia  2020-07: Urinary tract infection  2020-07: Crohn's disease of small intestine (H)  2020-07: Cholangitis (H)  2020-07: Gastric cancer (H)  2020-06: Chemotherapy-induced neutropenia  2020-06: Mucositis  2020-05: Pneumonia  2020-02: Gastric adenocarcinoma (H)  2020-02: Intra-abdominal fluid collection  2020-02: Bacteremia due to Gram-negative bacteria  2020-02: Malnutrition  2020-02: Leukocytosis  2020-01: Low urine output  2020-01: Hypophosphatemia  2020-01: Long term (current) use of opiate analgesic  2020-01: Bursitis  2020-01: Depression  2020-01: Sacroiliitis  2020-01: IPMN (intraductal papillary mucinous neoplasm)  2018-08: Chronic neck pain  2018-06: Migraine with aura  2018-04: Unilateral inguinal hernia without obstruction or gangrene  2017-08: Chronic bilateral low back pain without sciatica  2017-08: Cervical radiculopathy, chronic  2017-06: Controlled substance agreement signed  2016-10: Infected prosthesis of left hip  2016-10: Status post left hip replacement  2016-09: Screening for malignant neoplasm of cervix  2016-08: Chronic pain disorder  2016-08: Continuous opioid dependence (H)  2016-08: Degeneration of intervertebral disc of lumbosacral region  2016-08: Spondylosis of lumbosacral spine without myelopathy  2015-10: Arthralgia of hip  2015-10: Greater trochanteric bursitis of left hip  2014-12: Lower limb length difference  2008-12: Debility  2008-11: MDD (major depressive disorder), recurrent, in partial   remission  2008-04: Disorder of sacrum  2006-12: Allergic rhinitis      Past Medical History:   Diagnosis Date    Allergic rhinitis     Cancer (H)     stomach cancer    Chronic pain     Closed fracture of distal end of left radius     Depression      Gastric adenocarcinoma (H)     Lumbago     Migraine     Opioid dependence (H)     Other chronic pain     low back    Sacroiliitis     low back pain    Trochanteric bursitis     leg length discrrepancy, hip pain       Social History     Tobacco Use    Smoking status: Former     Current packs/day: 0.00     Average packs/day: 0.5 packs/day for 25.0 years (12.5 ttl pk-yrs)     Types: Cigarettes     Start date: 1995     Quit date: 2020     Years since quittin.5     Passive exposure: Past    Smokeless tobacco: Never   Substance Use Topics    Alcohol use: No       ROS is negative other than what is noted in HPI.       Vitals:    25 1802   BP: 108/71   Pulse: 94   Resp: 14   Temp: 98  F (36.7  C)   TempSrc: Oral   SpO2: 97%   Weight: 70.8 kg (156 lb)       Physical Exam  Constitutional:       General: She is not in acute distress.     Appearance: She is not diaphoretic.   HENT:      Head: Normocephalic and atraumatic.      Right Ear: External ear normal.      Left Ear: External ear normal.   Eyes:      Conjunctiva/sclera: Conjunctivae normal.   Cardiovascular:      Rate and Rhythm: Normal rate and regular rhythm.   Pulmonary:      Effort: Pulmonary effort is normal. No respiratory distress.   Abdominal:      Palpations: Abdomen is soft.      Tenderness: There is no abdominal tenderness. There is no right CVA tenderness, left CVA tenderness, guarding or rebound.   Neurological:      General: No focal deficit present.      Mental Status: She is alert.   Psychiatric:         Mood and Affect: Mood normal.         Thought Content: Thought content normal.         Judgment: Judgment normal.         Results:  Results for orders placed or performed in visit on 25   UA Macroscopic with reflex to Microscopic and Culture - Clinic Collect     Status: Abnormal    Specimen: Urine, Clean Catch   Result Value Ref Range    Color Urine Yellow Colorless, Straw, Light Yellow, Yellow    Appearance Urine Clear Clear     Glucose Urine Negative Negative mg/dL    Bilirubin Urine Negative Negative    Ketones Urine Trace (A) Negative mg/dL    Specific Gravity Urine 1.020 1.005 - 1.030    Blood Urine Small (A) Negative    pH Urine 6.0 5.0 - 8.0    Protein Albumin Urine Negative Negative mg/dL    Urobilinogen Urine 1.0 0.2, 1.0 E.U./dL    Nitrite Urine Positive (A) Negative    Leukocyte Esterase Urine Trace (A) Negative   Urine Microscopic Exam     Status: Abnormal   Result Value Ref Range    Bacteria Urine Many (A) None Seen /HPF    RBC Urine 2-5 (A) 0-2 /HPF /HPF    WBC Urine 5-10 (A) 0-5 /HPF /HPF    Squamous Epithelials Urine Moderate (A) None Seen /LPF    Mucus Urine Present (A) None Seen /LPF         At the end of the encounter, I discussed results, diagnosis, medications. Discussed red flags for immediate return to clinic/ER, as well as indications for follow up if no improvement. Patient understood and agreed to plan. Patient was stable for discharge.    NINA Greene [facility]

## 2025-07-24 LAB
BACTERIA UR CULT: ABNORMAL
BACTERIA UR CULT: ABNORMAL

## 2025-08-06 ENCOUNTER — THERAPY VISIT (OUTPATIENT)
Age: 60
End: 2025-08-06
Attending: NURSE PRACTITIONER
Payer: MEDICARE

## 2025-08-06 DIAGNOSIS — M54.16 LUMBAR RADICULOPATHY: ICD-10-CM

## 2025-08-06 PROCEDURE — 97161 PT EVAL LOW COMPLEX 20 MIN: CPT | Mod: GP | Performed by: PHYSICAL THERAPIST

## 2025-08-06 PROCEDURE — 97110 THERAPEUTIC EXERCISES: CPT | Mod: GP | Performed by: PHYSICAL THERAPIST

## 2025-08-20 ENCOUNTER — THERAPY VISIT (OUTPATIENT)
Age: 60
End: 2025-08-20
Attending: NURSE PRACTITIONER
Payer: MEDICARE

## 2025-08-20 DIAGNOSIS — M25.511 CHRONIC RIGHT SHOULDER PAIN: ICD-10-CM

## 2025-08-20 DIAGNOSIS — G89.29 CHRONIC RIGHT SHOULDER PAIN: ICD-10-CM

## 2025-08-20 DIAGNOSIS — M54.16 LUMBAR RADICULOPATHY: Primary | ICD-10-CM

## 2025-08-20 PROCEDURE — 97530 THERAPEUTIC ACTIVITIES: CPT | Mod: GP

## 2025-08-20 PROCEDURE — 97110 THERAPEUTIC EXERCISES: CPT | Mod: GP

## 2025-08-27 ENCOUNTER — TELEPHONE (OUTPATIENT)
Dept: PALLIATIVE MEDICINE | Facility: OTHER | Age: 60
End: 2025-08-27
Payer: MEDICARE

## 2025-08-28 ENCOUNTER — RADIOLOGY INJECTION OFFICE VISIT (OUTPATIENT)
Dept: PALLIATIVE MEDICINE | Facility: OTHER | Age: 60
End: 2025-08-28
Attending: NURSE PRACTITIONER
Payer: MEDICARE

## 2025-08-28 VITALS — SYSTOLIC BLOOD PRESSURE: 121 MMHG | DIASTOLIC BLOOD PRESSURE: 69 MMHG | HEART RATE: 87 BPM

## 2025-08-28 DIAGNOSIS — M54.16 LUMBAR RADICULOPATHY: ICD-10-CM

## 2025-08-28 PROCEDURE — 250N000009 HC RX 250: Performed by: STUDENT IN AN ORGANIZED HEALTH CARE EDUCATION/TRAINING PROGRAM

## 2025-08-28 PROCEDURE — 62323 NJX INTERLAMINAR LMBR/SAC: CPT | Performed by: STUDENT IN AN ORGANIZED HEALTH CARE EDUCATION/TRAINING PROGRAM

## 2025-08-28 PROCEDURE — 255N000002 HC RX 255 OP 636: Performed by: STUDENT IN AN ORGANIZED HEALTH CARE EDUCATION/TRAINING PROGRAM

## 2025-08-28 PROCEDURE — 250N000011 HC RX IP 250 OP 636: Mod: JZ | Performed by: STUDENT IN AN ORGANIZED HEALTH CARE EDUCATION/TRAINING PROGRAM

## 2025-08-28 RX ORDER — LIDOCAINE HYDROCHLORIDE 10 MG/ML
1 INJECTION, SOLUTION EPIDURAL; INFILTRATION; INTRACAUDAL; PERINEURAL ONCE
Status: DISCONTINUED | OUTPATIENT
Start: 2025-08-28 | End: 2025-08-28

## 2025-08-28 RX ORDER — METHYLPREDNISOLONE ACETATE 40 MG/ML
40 INJECTION, SUSPENSION INTRA-ARTICULAR; INTRALESIONAL; INTRAMUSCULAR; SOFT TISSUE ONCE
Status: DISCONTINUED | OUTPATIENT
Start: 2025-08-28 | End: 2025-08-28

## 2025-08-28 RX ORDER — LIDOCAINE HYDROCHLORIDE 10 MG/ML
1 INJECTION, SOLUTION EPIDURAL; INFILTRATION; INTRACAUDAL; PERINEURAL ONCE
Status: COMPLETED | OUTPATIENT
Start: 2025-08-28 | End: 2025-08-28

## 2025-08-28 RX ORDER — METHYLPREDNISOLONE ACETATE 40 MG/ML
40 INJECTION, SUSPENSION INTRA-ARTICULAR; INTRALESIONAL; INTRAMUSCULAR; SOFT TISSUE ONCE
Status: COMPLETED | OUTPATIENT
Start: 2025-08-28 | End: 2025-08-28

## 2025-08-28 RX ADMIN — LIDOCAINE HYDROCHLORIDE 1 ML: 10 INJECTION, SOLUTION EPIDURAL; INFILTRATION; INTRACAUDAL; PERINEURAL at 10:23

## 2025-08-28 RX ADMIN — METHYLPREDNISOLONE ACETATE 40 MG: 40 INJECTION, SUSPENSION INTRA-ARTICULAR; INTRALESIONAL; INTRAMUSCULAR; SOFT TISSUE at 10:23

## 2025-08-28 RX ADMIN — IOHEXOL 0.5 ML: 180 INJECTION INTRAVENOUS at 09:58

## 2025-08-28 ASSESSMENT — PAIN SCALES - GENERAL: PAINLEVEL_OUTOF10: MILD PAIN (1)

## 2025-08-30 DIAGNOSIS — M79.2 NEUROPATHIC PAIN: ICD-10-CM

## 2025-09-03 RX ORDER — AMITRIPTYLINE HYDROCHLORIDE 75 MG/1
75 TABLET ORAL AT BEDTIME
Qty: 30 TABLET | Refills: 3 | Status: SHIPPED | OUTPATIENT
Start: 2025-09-03

## (undated) DEVICE — STPL SKIN 35W ROTATING HEAD PRW35

## (undated) DEVICE — ESU LIGASURE LAPAROSCOPIC BLUNT TIP SEALER 5MMX37CM LF1837

## (undated) DEVICE — SU SILK 4-0 TF-4 18" N272H

## (undated) DEVICE — STPL RELOAD REG TISSUE ECHELON 60 X 3.6MM BLUE GST60B

## (undated) DEVICE — SU SILK 2-0 SH 30" K833H

## (undated) DEVICE — SU ETHIBOND 3-0 RB-1 30" X872H

## (undated) DEVICE — SUTURE BOOTIES YELLOW 053003PBX

## (undated) DEVICE — SOL WATER IRRIG 1000ML BOTTLE 2F7114

## (undated) DEVICE — GLOVE PROTEXIS BLUE W/NEU-THERA 7.5  2D73EB75

## (undated) DEVICE — BNDG ELASTIC 2"X5YDS UNSTERILE 6611-20

## (undated) DEVICE — VESSEL LOOPS YELLOW MAXI 31145694

## (undated) DEVICE — BIOPSY VALVE BIOSHIELD 00711135

## (undated) DEVICE — SU PDS II 4-0 SH 27" Z315H

## (undated) DEVICE — SU SILK 0 TIE 6X30" A306H

## (undated) DEVICE — STPL LINEAR CUT 60X3.5MM TX60B

## (undated) DEVICE — ANTIFOG SOLUTION W/FOAM PAD 31142527

## (undated) DEVICE — Device

## (undated) DEVICE — LINEN ORTHO PACK 5446

## (undated) DEVICE — BNDG ELASTIC 3"X5YDS UNSTERILE 6611-30

## (undated) DEVICE — SU PROLENE 4-0 SHDA 36" 8521H

## (undated) DEVICE — SU PDS II 1 TP-1 48" Z880G

## (undated) DEVICE — SU SILK 2-0 TIE 12X30" A305H

## (undated) DEVICE — SU SILK 3-0 TIE 12X30" A304H

## (undated) DEVICE — LINEN TOWEL PACK X6 WHITE 5487

## (undated) DEVICE — CATH RETRIEVAL BALLOON EXTRACTOR PRO RX-S INJ ABOVE 9-12MM

## (undated) DEVICE — DRAIN JACKSON PRATT RESERVOIR 100ML SU130-1305

## (undated) DEVICE — ESU GROUND PAD ADULT W/CORD E7507

## (undated) DEVICE — CLIP HORIZON MED BLUE 002200

## (undated) DEVICE — DRSG ABDOMINAL 07 1/2X8" 7197D

## (undated) DEVICE — SPONGE KITTNER 30-101

## (undated) DEVICE — SPONGE LAP 18X18" X8435

## (undated) DEVICE — DRAPE POUCH INSTRUMENT 1018

## (undated) DEVICE — TUBING EXTENSION SET MICROBORE 6" LL 2N1194

## (undated) DEVICE — CATH TRAY FOLEY SURESTEP 16FR W/URINE MTR STATLK LF A303416A

## (undated) DEVICE — PREP CHLORAPREP 26ML TINTED ORANGE  260815

## (undated) DEVICE — LINEN TOWEL PACK X30 5481

## (undated) DEVICE — ENDO FUSION OMNI-TOME G31903

## (undated) DEVICE — VESSEL LOOPS BLUE SUPERMAXI 011022PBX

## (undated) DEVICE — TUBING INSUFFLATION W/FILTER CPC TO LUER 620-030-301

## (undated) DEVICE — STPL LINEAR CUT 100MM THICK TCT10

## (undated) DEVICE — DRSG STERI STRIP 1X5" R1548

## (undated) DEVICE — CLIP HORIZON LG ORANGE 004200

## (undated) DEVICE — KIT ENDO FIRST STEP DISINFECTANT 200ML W/POUCH EP-4

## (undated) DEVICE — K WIRE

## (undated) DEVICE — SUCTION MANIFOLD DORNOCH ULTRA CART UL-CL500

## (undated) DEVICE — ENDO TROCAR SLEEVE KII Z-THREADED 05X100MM CTS02

## (undated) DEVICE — STPL POWERED ECHELON VASC 35MM PVE35A

## (undated) DEVICE — ENDO TROCAR FIRST ENTRY KII FIOS Z-THRD 12X100MM CTF73

## (undated) DEVICE — TOURNIQUET SGL BLADDER 18"X4" RED 5921-218-135

## (undated) DEVICE — SU PROLENE 5-0 RB-1DA 36"  8556H

## (undated) DEVICE — TRAY PAIN INJECTION 97A 640

## (undated) DEVICE — KIT CONNECTOR FOR OLYMPUS ENDOSCOPES DEFENDO 100310

## (undated) DEVICE — ESU GROUND PAD THERMOGUARD PLUS ABC PEDS 7-382

## (undated) DEVICE — GLOVE BIOGEL PI ULTRATOUCH SZ 7.0 41170

## (undated) DEVICE — SU PDS II 0 TP-1 60" Z991G

## (undated) DEVICE — SU VICRYL 3-0 SH 27" UND J416H

## (undated) DEVICE — COVER CAMERA IN-LIGHT DISP LT-C02

## (undated) DEVICE — ENDO CATH BALLOON DILATION HURRICANE 06MMX4X180CM M00545920

## (undated) DEVICE — SLING ARM MED 79-99155

## (undated) DEVICE — DRAPE IOBAN INCISE 23X17" 6650EZ

## (undated) DEVICE — SOL NACL 0.9% IRRIG 1000ML BOTTLE 2F7124

## (undated) DEVICE — SU MONOCRYL 4-0 PS-2 27" UND Y426H

## (undated) DEVICE — IMPLANTABLE DEVICE: Type: IMPLANTABLE DEVICE | Site: WRIST | Status: NON-FUNCTIONAL

## (undated) DEVICE — CATH CHOLANGIOGRAM 7.5FR TAUT PLASTIC TIP 20018-010

## (undated) DEVICE — DRAPE SHEET REV FOLD 3/4 9349

## (undated) DEVICE — SU UMBILICAL TAPE .125X30" U11T

## (undated) DEVICE — INFLATION DEVICE BIG 60 ENDO-AN6012

## (undated) DEVICE — STPL RELOAD LINEAR CUT ENDOPATH ECHELON 60MM BLK GST60T

## (undated) DEVICE — ESU ELEC NDL 1" COATED/INSULATED E1465

## (undated) DEVICE — PREP CHLORHEXIDINE 4% 4OZ (HIBICLENS) 57504

## (undated) DEVICE — DRSG MEDIPORE 3 1/2X13 3/4" 3573

## (undated) DEVICE — SOL NACL 0.9% IRRIG 500ML BOTTLE 2F7123

## (undated) DEVICE — DRAIN JACKSON PRATT CHANNEL 19FR ROUND HUBLESS SIL JP-2230

## (undated) DEVICE — DRSG PRIMAPORE 02X3" 7133

## (undated) DEVICE — ENDO FORCEP ENDOJAW BIOPSY 2.8MMX230CM FB-220U

## (undated) DEVICE — SUTURE BOOTS 051003PBX

## (undated) DEVICE — ADH SKIN CLOSURE PREMIERPRO EXOFIN 1.0ML 3470

## (undated) DEVICE — NDL INSUFFLATION 13GA 120MM C2201

## (undated) DEVICE — NDL COUNTER 20CT 31142493

## (undated) DEVICE — SU ETHILON 3-0 PS-1 18" 1663H

## (undated) DEVICE — STPL RELOAD LINEAR CUT 100X4.5MM TRT10

## (undated) DEVICE — PACK ENDOSCOPY GI CUSTOM UMMC

## (undated) DEVICE — RX BACITRACIN OINTMENT 0.9G 1/32OZ CUR001109

## (undated) DEVICE — CAST PADDING 3" COTTON SPECIALIST 100 UNSTERILE 7053

## (undated) DEVICE — SU SILK 4-0 TIE 12X30" A303H

## (undated) DEVICE — GLOVE PROTEXIS W/NEU-THERA 7.0  2D73TE70

## (undated) DEVICE — ENDO TROCAR BLUNT TIP KII BALLOON 12X100MM C0R47

## (undated) DEVICE — STPL ENDO LINEAR CUT ECHELON GST 60MM COMPACT PCEE60A

## (undated) DEVICE — ESU LIGASURE IMPACT OPEN SEALER/DVDR CVD LG JAW LF4418

## (undated) DEVICE — LABEL MEDICATION SYSTEM 3303-P

## (undated) DEVICE — ENDO TUBING CO2 SMARTCAP STERILE DISP 100145CO2EXT

## (undated) DEVICE — PACK HAND CUSTOM ASC

## (undated) DEVICE — SOL WATER IRRIG 500ML BOTTLE 2F7113

## (undated) DEVICE — GLOVE PROTEXIS MICRO 7.0  2D73PM70

## (undated) DEVICE — SU PROLENE 6-0 C-1DA 30" 8706H

## (undated) DEVICE — PREP CHLORAPREP W/ORANGE TINT 10.5ML 260715

## (undated) DEVICE — ENDO TROCAR FIRST ENTRY KII FIOS Z-THRD 05X100MM CTF03

## (undated) DEVICE — GOWN XLG DISP 9545

## (undated) DEVICE — WIRE GUIDE 0.025"X270CM ANG VISIGLIDE G-240-2527A

## (undated) DEVICE — STPL ENDO LINEAR CUT ECHELON GST 45MM COMPACT PCEE45A

## (undated) DEVICE — STPL RELOAD LINEAR 60X3.5MM XR60B

## (undated) DEVICE — ESU ENDO SCISSORS 5MM CVD 5DCS

## (undated) DEVICE — SU VICRYL 3-0 MH 27" J322H

## (undated) DEVICE — SUCTION MANIFOLD NEPTUNE 2 SYS 1 PORT 702-025-000

## (undated) DEVICE — DRAPE C-ARM OEC MINI VIEW 6800   00-901917-01

## (undated) DEVICE — SURGICEL ABSORBABLE HEMOSTAT SNOW 4"X4" 2083

## (undated) DEVICE — SURGICEL HEMOSTAT 4X8" 1952

## (undated) DEVICE — CLIP HORIZON SM RED WIDE SLOT 001201

## (undated) DEVICE — STPL POWERED ECHELON 60MM PSEE60A

## (undated) DEVICE — CAST PLASTER SPLINT 4X15" 7394

## (undated) DEVICE — ENDO DEVICE LOCKING AND BIOPSY CAP M00545261

## (undated) DEVICE — SU SILK 3-0 SH CR 8X18" C013D

## (undated) DEVICE — SU PROLENE 4-0 RB-1DA 36" 8557H

## (undated) DEVICE — DRSG GAUZE 4X4" TRAY 6939

## (undated) DEVICE — ENDO BITE BLOCK ADULT OMNI-BLOC

## (undated) DEVICE — SUCTION IRR STRYKERFLOW II W/TIP 250-070-520

## (undated) DEVICE — PREP CHLORAPREP 26ML TINTED HI-LITE ORANGE 930815

## (undated) DEVICE — SU SILK 1 TIE 6X30" A307H

## (undated) DEVICE — SU PDS II 5-0 RB-1 27" Z303H

## (undated) DEVICE — LINEN TOWEL PACK X5 5464

## (undated) DEVICE — WIPES FOLEY CARE SURESTEP PROVON DFC100

## (undated) DEVICE — DRAIN JACKSON PRATT ROUND SIL 19FR W/TROCAR LF JP-2232

## (undated) RX ORDER — HYDROMORPHONE HYDROCHLORIDE 1 MG/ML
INJECTION, SOLUTION INTRAMUSCULAR; INTRAVENOUS; SUBCUTANEOUS
Status: DISPENSED
Start: 2020-01-28

## (undated) RX ORDER — FENTANYL CITRATE 50 UG/ML
INJECTION, SOLUTION INTRAMUSCULAR; INTRAVENOUS
Status: DISPENSED
Start: 2020-10-13

## (undated) RX ORDER — ONDANSETRON 2 MG/ML
INJECTION INTRAMUSCULAR; INTRAVENOUS
Status: DISPENSED
Start: 2020-12-07

## (undated) RX ORDER — HYDROMORPHONE HYDROCHLORIDE 1 MG/ML
INJECTION, SOLUTION INTRAMUSCULAR; INTRAVENOUS; SUBCUTANEOUS
Status: DISPENSED
Start: 2020-07-27

## (undated) RX ORDER — PROPOFOL 10 MG/ML
INJECTION, EMULSION INTRAVENOUS
Status: DISPENSED
Start: 2022-09-22

## (undated) RX ORDER — LIDOCAINE HYDROCHLORIDE 20 MG/ML
INJECTION, SOLUTION EPIDURAL; INFILTRATION; INTRACAUDAL; PERINEURAL
Status: DISPENSED
Start: 2020-01-28

## (undated) RX ORDER — ATROPINE SULFATE 0.4 MG/ML
AMPUL (ML) INJECTION
Status: DISPENSED
Start: 2020-01-28

## (undated) RX ORDER — HEPARIN SODIUM (PORCINE) LOCK FLUSH IV SOLN 100 UNIT/ML 100 UNIT/ML
SOLUTION INTRAVENOUS
Status: DISPENSED
Start: 2021-05-21

## (undated) RX ORDER — GLYCOPYRROLATE 0.2 MG/ML
INJECTION, SOLUTION INTRAMUSCULAR; INTRAVENOUS
Status: DISPENSED
Start: 2020-10-21

## (undated) RX ORDER — LIDOCAINE HYDROCHLORIDE 20 MG/ML
INJECTION, SOLUTION EPIDURAL; INFILTRATION; INTRACAUDAL; PERINEURAL
Status: DISPENSED
Start: 2021-02-08

## (undated) RX ORDER — FENTANYL CITRATE 50 UG/ML
INJECTION, SOLUTION INTRAMUSCULAR; INTRAVENOUS
Status: DISPENSED
Start: 2020-01-28

## (undated) RX ORDER — FENTANYL CITRATE 50 UG/ML
INJECTION, SOLUTION INTRAMUSCULAR; INTRAVENOUS
Status: DISPENSED
Start: 2021-02-08

## (undated) RX ORDER — PROPOFOL 10 MG/ML
INJECTION, EMULSION INTRAVENOUS
Status: DISPENSED
Start: 2021-02-08

## (undated) RX ORDER — PHENYLEPHRINE HCL IN 0.9% NACL 1 MG/10 ML
SYRINGE (ML) INTRAVENOUS
Status: DISPENSED
Start: 2020-01-28

## (undated) RX ORDER — HYDROMORPHONE HYDROCHLORIDE 1 MG/ML
INJECTION, SOLUTION INTRAMUSCULAR; INTRAVENOUS; SUBCUTANEOUS
Status: DISPENSED
Start: 2020-10-21

## (undated) RX ORDER — OXYCODONE HYDROCHLORIDE 5 MG/1
TABLET ORAL
Status: DISPENSED
Start: 2020-10-13

## (undated) RX ORDER — PROPOFOL 10 MG/ML
INJECTION, EMULSION INTRAVENOUS
Status: DISPENSED
Start: 2020-03-03

## (undated) RX ORDER — HEPARIN SODIUM 5000 [USP'U]/.5ML
INJECTION, SOLUTION INTRAVENOUS; SUBCUTANEOUS
Status: DISPENSED
Start: 2020-10-21

## (undated) RX ORDER — HYDROMORPHONE HYDROCHLORIDE 1 MG/ML
INJECTION, SOLUTION INTRAMUSCULAR; INTRAVENOUS; SUBCUTANEOUS
Status: DISPENSED
Start: 2021-02-08

## (undated) RX ORDER — FENTANYL CITRATE 50 UG/ML
INJECTION, SOLUTION INTRAMUSCULAR; INTRAVENOUS
Status: DISPENSED
Start: 2020-12-07

## (undated) RX ORDER — HEPARIN SODIUM (PORCINE) LOCK FLUSH IV SOLN 100 UNIT/ML 100 UNIT/ML
SOLUTION INTRAVENOUS
Status: DISPENSED
Start: 2020-11-20

## (undated) RX ORDER — GLYCOPYRROLATE 0.2 MG/ML
INJECTION, SOLUTION INTRAMUSCULAR; INTRAVENOUS
Status: DISPENSED
Start: 2020-12-07

## (undated) RX ORDER — LIDOCAINE HYDROCHLORIDE 20 MG/ML
INJECTION, SOLUTION EPIDURAL; INFILTRATION; INTRACAUDAL; PERINEURAL
Status: DISPENSED
Start: 2020-12-07

## (undated) RX ORDER — CLINDAMYCIN PHOSPHATE 900 MG/50ML
INJECTION, SOLUTION INTRAVENOUS
Status: DISPENSED
Start: 2020-03-26

## (undated) RX ORDER — CEFAZOLIN SODIUM 2 G/50ML
SOLUTION INTRAVENOUS
Status: DISPENSED
Start: 2022-09-22

## (undated) RX ORDER — ESMOLOL HYDROCHLORIDE 10 MG/ML
INJECTION INTRAVENOUS
Status: DISPENSED
Start: 2020-07-27

## (undated) RX ORDER — PROPOFOL 10 MG/ML
INJECTION, EMULSION INTRAVENOUS
Status: DISPENSED
Start: 2020-01-28

## (undated) RX ORDER — CEFAZOLIN SODIUM 1 G/3ML
INJECTION, POWDER, FOR SOLUTION INTRAMUSCULAR; INTRAVENOUS
Status: DISPENSED
Start: 2020-10-21

## (undated) RX ORDER — FENTANYL CITRATE 50 UG/ML
INJECTION, SOLUTION INTRAMUSCULAR; INTRAVENOUS
Status: DISPENSED
Start: 2020-10-21

## (undated) RX ORDER — ONDANSETRON 2 MG/ML
INJECTION INTRAMUSCULAR; INTRAVENOUS
Status: DISPENSED
Start: 2020-10-13

## (undated) RX ORDER — SCOLOPAMINE TRANSDERMAL SYSTEM 1 MG/1
PATCH, EXTENDED RELEASE TRANSDERMAL
Status: DISPENSED
Start: 2020-12-07

## (undated) RX ORDER — LIDOCAINE HYDROCHLORIDE 20 MG/ML
INJECTION, SOLUTION EPIDURAL; INFILTRATION; INTRACAUDAL; PERINEURAL
Status: DISPENSED
Start: 2020-03-03

## (undated) RX ORDER — FENTANYL CITRATE 50 UG/ML
INJECTION, SOLUTION INTRAMUSCULAR; INTRAVENOUS
Status: DISPENSED
Start: 2020-07-27

## (undated) RX ORDER — HEPARIN SODIUM (PORCINE) LOCK FLUSH IV SOLN 100 UNIT/ML 100 UNIT/ML
SOLUTION INTRAVENOUS
Status: DISPENSED
Start: 2022-09-22

## (undated) RX ORDER — SODIUM CHLORIDE, SODIUM LACTATE, POTASSIUM CHLORIDE, CALCIUM CHLORIDE 600; 310; 30; 20 MG/100ML; MG/100ML; MG/100ML; MG/100ML
INJECTION, SOLUTION INTRAVENOUS
Status: DISPENSED
Start: 2020-01-28

## (undated) RX ORDER — HEPARIN SODIUM (PORCINE) LOCK FLUSH IV SOLN 100 UNIT/ML 100 UNIT/ML
SOLUTION INTRAVENOUS
Status: DISPENSED
Start: 2020-09-23

## (undated) RX ORDER — HEPARIN SODIUM (PORCINE) LOCK FLUSH IV SOLN 100 UNIT/ML 100 UNIT/ML
SOLUTION INTRAVENOUS
Status: DISPENSED
Start: 2022-11-28

## (undated) RX ORDER — FENTANYL CITRATE-0.9 % NACL/PF 10 MCG/ML
PLASTIC BAG, INJECTION (ML) INTRAVENOUS
Status: DISPENSED
Start: 2020-10-21

## (undated) RX ORDER — NEOSTIGMINE METHYLSULFATE 1 MG/ML
VIAL (ML) INJECTION
Status: DISPENSED
Start: 2020-12-07

## (undated) RX ORDER — DEXAMETHASONE SODIUM PHOSPHATE 4 MG/ML
INJECTION, SOLUTION INTRA-ARTICULAR; INTRALESIONAL; INTRAMUSCULAR; INTRAVENOUS; SOFT TISSUE
Status: DISPENSED
Start: 2020-10-21

## (undated) RX ORDER — ONDANSETRON 2 MG/ML
INJECTION INTRAMUSCULAR; INTRAVENOUS
Status: DISPENSED
Start: 2020-10-21

## (undated) RX ORDER — SIMETHICONE 20 MG/.3ML
EMULSION ORAL
Status: DISPENSED
Start: 2020-03-03

## (undated) RX ORDER — HEPARIN SODIUM (PORCINE) LOCK FLUSH IV SOLN 100 UNIT/ML 100 UNIT/ML
SOLUTION INTRAVENOUS
Status: DISPENSED
Start: 2023-11-13

## (undated) RX ORDER — ONDANSETRON 2 MG/ML
INJECTION INTRAMUSCULAR; INTRAVENOUS
Status: DISPENSED
Start: 2020-01-28

## (undated) RX ORDER — HEPARIN SODIUM (PORCINE) LOCK FLUSH IV SOLN 100 UNIT/ML 100 UNIT/ML
SOLUTION INTRAVENOUS
Status: DISPENSED
Start: 2020-12-07

## (undated) RX ORDER — PROPOFOL 10 MG/ML
INJECTION, EMULSION INTRAVENOUS
Status: DISPENSED
Start: 2020-10-13

## (undated) RX ORDER — HEPARIN SODIUM (PORCINE) LOCK FLUSH IV SOLN 100 UNIT/ML 100 UNIT/ML
SOLUTION INTRAVENOUS
Status: DISPENSED
Start: 2021-11-15

## (undated) RX ORDER — LIDOCAINE HYDROCHLORIDE 20 MG/ML
INJECTION, SOLUTION EPIDURAL; INFILTRATION; INTRACAUDAL; PERINEURAL
Status: DISPENSED
Start: 2020-10-13

## (undated) RX ORDER — PROPOFOL 10 MG/ML
INJECTION, EMULSION INTRAVENOUS
Status: DISPENSED
Start: 2020-12-07

## (undated) RX ORDER — HEPARIN SODIUM (PORCINE) LOCK FLUSH IV SOLN 100 UNIT/ML 100 UNIT/ML
SOLUTION INTRAVENOUS
Status: DISPENSED
Start: 2020-04-13

## (undated) RX ORDER — CEFAZOLIN SODIUM 2 G/100ML
INJECTION, SOLUTION INTRAVENOUS
Status: DISPENSED
Start: 2020-10-21

## (undated) RX ORDER — ONDANSETRON 2 MG/ML
INJECTION INTRAMUSCULAR; INTRAVENOUS
Status: DISPENSED
Start: 2020-03-03

## (undated) RX ORDER — LIDOCAINE HYDROCHLORIDE AND EPINEPHRINE 10; 10 MG/ML; UG/ML
INJECTION, SOLUTION INFILTRATION; PERINEURAL
Status: DISPENSED
Start: 2023-12-08

## (undated) RX ORDER — LIDOCAINE HYDROCHLORIDE 10 MG/ML
INJECTION, SOLUTION INFILTRATION; PERINEURAL
Status: DISPENSED
Start: 2023-12-08

## (undated) RX ORDER — SODIUM CHLORIDE 9 MG/ML
INJECTION, SOLUTION INTRAVENOUS
Status: DISPENSED
Start: 2020-07-27

## (undated) RX ORDER — HEPARIN SODIUM (PORCINE) LOCK FLUSH IV SOLN 100 UNIT/ML 100 UNIT/ML
SOLUTION INTRAVENOUS
Status: DISPENSED
Start: 2023-05-17

## (undated) RX ORDER — FENTANYL CITRATE 50 UG/ML
INJECTION, SOLUTION INTRAMUSCULAR; INTRAVENOUS
Status: DISPENSED
Start: 2022-09-22

## (undated) RX ORDER — DEXAMETHASONE SODIUM PHOSPHATE 4 MG/ML
INJECTION, SOLUTION INTRA-ARTICULAR; INTRALESIONAL; INTRAMUSCULAR; INTRAVENOUS; SOFT TISSUE
Status: DISPENSED
Start: 2020-12-07

## (undated) RX ORDER — DEXAMETHASONE SODIUM PHOSPHATE 4 MG/ML
INJECTION, SOLUTION INTRA-ARTICULAR; INTRALESIONAL; INTRAMUSCULAR; INTRAVENOUS; SOFT TISSUE
Status: DISPENSED
Start: 2020-10-13

## (undated) RX ORDER — FENTANYL CITRATE 50 UG/ML
INJECTION, SOLUTION INTRAMUSCULAR; INTRAVENOUS
Status: DISPENSED
Start: 2023-12-08

## (undated) RX ORDER — SODIUM CHLORIDE, SODIUM LACTATE, POTASSIUM CHLORIDE, CALCIUM CHLORIDE 600; 310; 30; 20 MG/100ML; MG/100ML; MG/100ML; MG/100ML
INJECTION, SOLUTION INTRAVENOUS
Status: DISPENSED
Start: 2020-10-21

## (undated) RX ORDER — DEXAMETHASONE SODIUM PHOSPHATE 4 MG/ML
INJECTION, SOLUTION INTRA-ARTICULAR; INTRALESIONAL; INTRAMUSCULAR; INTRAVENOUS; SOFT TISSUE
Status: DISPENSED
Start: 2021-02-08

## (undated) RX ORDER — ONDANSETRON 2 MG/ML
INJECTION INTRAMUSCULAR; INTRAVENOUS
Status: DISPENSED
Start: 2021-02-08

## (undated) RX ORDER — OXYCODONE HYDROCHLORIDE 5 MG/1
TABLET ORAL
Status: DISPENSED
Start: 2022-09-22

## (undated) RX ORDER — FENTANYL CITRATE 50 UG/ML
INJECTION, SOLUTION INTRAMUSCULAR; INTRAVENOUS
Status: DISPENSED
Start: 2020-03-03

## (undated) RX ORDER — HEPARIN SODIUM (PORCINE) LOCK FLUSH IV SOLN 100 UNIT/ML 100 UNIT/ML
SOLUTION INTRAVENOUS
Status: DISPENSED
Start: 2022-05-17

## (undated) RX ORDER — LIDOCAINE HYDROCHLORIDE 20 MG/ML
INJECTION, SOLUTION EPIDURAL; INFILTRATION; INTRACAUDAL; PERINEURAL
Status: DISPENSED
Start: 2020-10-21

## (undated) RX ORDER — PROPOFOL 10 MG/ML
INJECTION, EMULSION INTRAVENOUS
Status: DISPENSED
Start: 2020-10-21